# Patient Record
Sex: FEMALE | Race: WHITE | NOT HISPANIC OR LATINO | Employment: UNEMPLOYED | URBAN - METROPOLITAN AREA
[De-identification: names, ages, dates, MRNs, and addresses within clinical notes are randomized per-mention and may not be internally consistent; named-entity substitution may affect disease eponyms.]

---

## 2017-01-14 ENCOUNTER — COMMUNICATION - HEALTHEAST (OUTPATIENT)
Dept: FAMILY MEDICINE | Facility: CLINIC | Age: 32
End: 2017-01-14

## 2017-01-14 DIAGNOSIS — F33.9 MAJOR DEPRESSIVE DISORDER, RECURRENT EPISODE (H): ICD-10-CM

## 2017-01-14 DIAGNOSIS — F41.1 GENERALIZED ANXIETY DISORDER: ICD-10-CM

## 2017-02-09 ENCOUNTER — COMMUNICATION - HEALTHEAST (OUTPATIENT)
Dept: FAMILY MEDICINE | Facility: CLINIC | Age: 32
End: 2017-02-09

## 2017-02-13 ENCOUNTER — COMMUNICATION - HEALTHEAST (OUTPATIENT)
Dept: FAMILY MEDICINE | Facility: CLINIC | Age: 32
End: 2017-02-13

## 2017-02-13 DIAGNOSIS — F33.9 MAJOR DEPRESSIVE DISORDER, RECURRENT EPISODE (H): ICD-10-CM

## 2017-02-13 DIAGNOSIS — F41.1 GENERALIZED ANXIETY DISORDER: ICD-10-CM

## 2017-03-03 ENCOUNTER — OFFICE VISIT - HEALTHEAST (OUTPATIENT)
Dept: SURGERY | Facility: CLINIC | Age: 32
End: 2017-03-03

## 2017-03-03 DIAGNOSIS — Z71.3 DIETARY COUNSELING: ICD-10-CM

## 2017-03-03 DIAGNOSIS — Z98.84 BARIATRIC SURGERY STATUS: ICD-10-CM

## 2017-03-03 ASSESSMENT — MIFFLIN-ST. JEOR: SCORE: 1318.54

## 2017-03-24 ENCOUNTER — OFFICE VISIT - HEALTHEAST (OUTPATIENT)
Dept: FAMILY MEDICINE | Facility: CLINIC | Age: 32
End: 2017-03-24

## 2017-03-24 DIAGNOSIS — F33.9 MAJOR DEPRESSIVE DISORDER, RECURRENT EPISODE (H): ICD-10-CM

## 2017-03-24 DIAGNOSIS — Z15.89 MTHFR MUTATION: ICD-10-CM

## 2017-03-24 DIAGNOSIS — R19.04 LEFT LOWER QUADRANT ABDOMINAL MASS: ICD-10-CM

## 2017-03-24 DIAGNOSIS — Z00.00 ROUTINE GENERAL MEDICAL EXAMINATION AT A HEALTH CARE FACILITY: ICD-10-CM

## 2017-03-24 DIAGNOSIS — F41.1 GENERALIZED ANXIETY DISORDER: ICD-10-CM

## 2017-03-24 ASSESSMENT — MIFFLIN-ST. JEOR: SCORE: 1297.68

## 2017-04-04 ENCOUNTER — OFFICE VISIT - HEALTHEAST (OUTPATIENT)
Dept: ONCOLOGY | Facility: HOSPITAL | Age: 32
End: 2017-04-04

## 2017-04-04 DIAGNOSIS — Z15.89 MTHFR MUTATION: ICD-10-CM

## 2017-04-04 ASSESSMENT — MIFFLIN-ST. JEOR: SCORE: 1312.65

## 2017-05-30 ENCOUNTER — COMMUNICATION - HEALTHEAST (OUTPATIENT)
Dept: SURGERY | Facility: CLINIC | Age: 32
End: 2017-05-30

## 2017-07-10 ENCOUNTER — AMBULATORY - HEALTHEAST (OUTPATIENT)
Dept: SURGERY | Facility: CLINIC | Age: 32
End: 2017-07-10

## 2017-07-10 DIAGNOSIS — K90.9 UNSPECIFIED INTESTINAL MALABSORPTION: ICD-10-CM

## 2017-07-10 DIAGNOSIS — Z98.84 S/P BARIATRIC SURGERY: ICD-10-CM

## 2017-07-10 DIAGNOSIS — K91.2 OTHER AND UNSPECIFIED POSTSURGICAL NONABSORPTION: ICD-10-CM

## 2017-07-11 ENCOUNTER — AMBULATORY - HEALTHEAST (OUTPATIENT)
Dept: LAB | Facility: CLINIC | Age: 32
End: 2017-07-11

## 2017-07-11 DIAGNOSIS — K91.2 OTHER AND UNSPECIFIED POSTSURGICAL NONABSORPTION: ICD-10-CM

## 2017-07-11 DIAGNOSIS — Z98.84 S/P BARIATRIC SURGERY: ICD-10-CM

## 2017-07-11 DIAGNOSIS — K90.9 UNSPECIFIED INTESTINAL MALABSORPTION: ICD-10-CM

## 2017-07-11 LAB
CHOLEST SERPL-MCNC: 152 MG/DL
FASTING STATUS PATIENT QL REPORTED: NORMAL
HDLC SERPL-MCNC: 54 MG/DL
LDLC SERPL CALC-MCNC: 76 MG/DL
TRIGL SERPL-MCNC: 112 MG/DL

## 2017-07-14 ENCOUNTER — OFFICE VISIT - HEALTHEAST (OUTPATIENT)
Dept: SURGERY | Facility: CLINIC | Age: 32
End: 2017-07-14

## 2017-07-14 DIAGNOSIS — K91.2 POSTOPERATIVE MALABSORPTION: ICD-10-CM

## 2017-07-14 DIAGNOSIS — E67.0 HYPERVITAMINOSIS A: ICD-10-CM

## 2017-07-14 DIAGNOSIS — R79.89 ELEVATED VITAMIN B12 LEVEL: ICD-10-CM

## 2017-07-14 DIAGNOSIS — Z98.84 S/P GASTRIC BYPASS: ICD-10-CM

## 2017-07-14 ASSESSMENT — MIFFLIN-ST. JEOR: SCORE: 1338.95

## 2017-09-03 ENCOUNTER — HEALTH MAINTENANCE LETTER (OUTPATIENT)
Age: 32
End: 2017-09-03

## 2018-03-06 ENCOUNTER — AMBULATORY - HEALTHEAST (OUTPATIENT)
Dept: SURGERY | Facility: CLINIC | Age: 33
End: 2018-03-06

## 2018-03-06 DIAGNOSIS — E67.0 HIGH VITAMIN A LEVEL: ICD-10-CM

## 2018-03-06 DIAGNOSIS — K90.9 INTESTINAL MALABSORPTION, UNSPECIFIED TYPE: ICD-10-CM

## 2018-03-13 ENCOUNTER — AMBULATORY - HEALTHEAST (OUTPATIENT)
Dept: LAB | Facility: CLINIC | Age: 33
End: 2018-03-13

## 2018-03-13 DIAGNOSIS — K90.9 INTESTINAL MALABSORPTION, UNSPECIFIED TYPE: ICD-10-CM

## 2018-03-13 DIAGNOSIS — E67.0 HIGH VITAMIN A LEVEL: ICD-10-CM

## 2018-03-13 LAB — VIT B12 SERPL-MCNC: 668 PG/ML (ref 213–816)

## 2018-03-14 ENCOUNTER — OFFICE VISIT - HEALTHEAST (OUTPATIENT)
Dept: SURGERY | Facility: CLINIC | Age: 33
End: 2018-03-14

## 2018-03-14 DIAGNOSIS — Z98.84 HX OF GASTRIC BYPASS: ICD-10-CM

## 2018-03-14 DIAGNOSIS — K91.2 POSTOPERATIVE MALABSORPTION: ICD-10-CM

## 2018-03-14 DIAGNOSIS — K08.89 PAIN, DENTAL: ICD-10-CM

## 2018-03-14 ASSESSMENT — MIFFLIN-ST. JEOR: SCORE: 1380.23

## 2018-03-16 LAB
ANNOTATION COMMENT IMP: NORMAL
VIT A SERPL-MCNC: 0.94 MG/L (ref 0.3–1.2)
VITAMIN A (RETINYL PALMITATE): 0.02 MG/L (ref 0–0.1)

## 2018-03-17 ENCOUNTER — COMMUNICATION - HEALTHEAST (OUTPATIENT)
Dept: FAMILY MEDICINE | Facility: CLINIC | Age: 33
End: 2018-03-17

## 2018-03-17 DIAGNOSIS — F41.1 GENERALIZED ANXIETY DISORDER: ICD-10-CM

## 2018-03-17 DIAGNOSIS — F33.9 MAJOR DEPRESSIVE DISORDER, RECURRENT EPISODE (H): ICD-10-CM

## 2018-03-17 LAB — VIT B1 PYROPHOSHATE BLD-SCNC: 140 NMOL/L (ref 70–180)

## 2018-05-08 ENCOUNTER — RECORDS - HEALTHEAST (OUTPATIENT)
Dept: ADMINISTRATIVE | Facility: OTHER | Age: 33
End: 2018-05-08

## 2018-05-09 ENCOUNTER — RECORDS - HEALTHEAST (OUTPATIENT)
Dept: ADMINISTRATIVE | Facility: OTHER | Age: 33
End: 2018-05-09

## 2018-05-09 ENCOUNTER — AMBULATORY - HEALTHEAST (OUTPATIENT)
Dept: UROLOGY | Facility: CLINIC | Age: 33
End: 2018-05-09

## 2018-05-09 ENCOUNTER — OFFICE VISIT - HEALTHEAST (OUTPATIENT)
Dept: UROLOGY | Facility: CLINIC | Age: 33
End: 2018-05-09

## 2018-05-09 DIAGNOSIS — N20.1 CALCULUS OF URETER: ICD-10-CM

## 2018-05-09 DIAGNOSIS — N13.2 HYDRONEPHROSIS WITH URINARY OBSTRUCTION DUE TO URETERAL CALCULUS: ICD-10-CM

## 2018-05-09 DIAGNOSIS — E27.8 ADRENAL MASS (H): ICD-10-CM

## 2018-05-09 DIAGNOSIS — N20.0 CALCULUS OF KIDNEY: ICD-10-CM

## 2018-05-09 DIAGNOSIS — N20.9 URINARY TRACT STONES: ICD-10-CM

## 2018-05-09 LAB
ALBUMIN UR-MCNC: ABNORMAL MG/DL
APPEARANCE UR: CLEAR
BILIRUB UR QL STRIP: NEGATIVE
COLOR UR AUTO: YELLOW
GLUCOSE UR STRIP-MCNC: NEGATIVE MG/DL
HGB UR QL STRIP: ABNORMAL
KETONES UR STRIP-MCNC: NEGATIVE MG/DL
LEUKOCYTE ESTERASE UR QL STRIP: ABNORMAL
NITRATE UR QL: NEGATIVE
PH UR STRIP: 6.5 [PH] (ref 5–8)
SP GR UR STRIP: 1.02 (ref 1–1.03)
UROBILINOGEN UR STRIP-ACNC: ABNORMAL

## 2018-05-11 ENCOUNTER — ANESTHESIA - HEALTHEAST (OUTPATIENT)
Dept: SURGERY | Facility: CLINIC | Age: 33
End: 2018-05-11

## 2018-05-11 ENCOUNTER — SURGERY - HEALTHEAST (OUTPATIENT)
Dept: SURGERY | Facility: CLINIC | Age: 33
End: 2018-05-11

## 2018-05-11 ASSESSMENT — MIFFLIN-ST. JEOR: SCORE: 1387.6

## 2018-05-14 ENCOUNTER — COMMUNICATION - HEALTHEAST (OUTPATIENT)
Dept: UROLOGY | Facility: CLINIC | Age: 33
End: 2018-05-14

## 2018-05-14 DIAGNOSIS — N20.1 CALCULUS OF URETER: ICD-10-CM

## 2018-05-20 ASSESSMENT — ENCOUNTER SYMPTOMS
POOR WOUND HEALING: 0
DIFFICULTY URINATING: 0
HEMATURIA: 0
SKIN CHANGES: 0
FLANK PAIN: 1
BRUISES/BLEEDS EASILY: 1
PANIC: 0
DYSURIA: 0
INSOMNIA: 1
DECREASED LIBIDO: 0
DEPRESSION: 1
NERVOUS/ANXIOUS: 1
DECREASED CONCENTRATION: 1
NAIL CHANGES: 0
HOT FLASHES: 0
SWOLLEN GLANDS: 0

## 2018-05-21 ENCOUNTER — PRE VISIT (OUTPATIENT)
Dept: UROLOGY | Facility: CLINIC | Age: 33
End: 2018-05-21

## 2018-05-21 ENCOUNTER — AMBULATORY - HEALTHEAST (OUTPATIENT)
Dept: UROLOGY | Facility: CLINIC | Age: 33
End: 2018-05-21

## 2018-05-21 DIAGNOSIS — N20.9 URINARY CALCULUS: ICD-10-CM

## 2018-05-21 DIAGNOSIS — N20.1 CALCULUS OF URETER: ICD-10-CM

## 2018-05-21 DIAGNOSIS — E27.8 ADRENAL MASS (H): ICD-10-CM

## 2018-05-21 DIAGNOSIS — N20.0 CALCULUS OF KIDNEY: ICD-10-CM

## 2018-05-21 LAB
ALBUMIN UR-MCNC: ABNORMAL MG/DL
APPEARANCE UR: ABNORMAL
BILIRUB UR QL STRIP: NEGATIVE
COLOR UR AUTO: ABNORMAL
GLUCOSE UR STRIP-MCNC: ABNORMAL MG/DL
HGB UR QL STRIP: ABNORMAL
KETONES UR STRIP-MCNC: ABNORMAL MG/DL
LEUKOCYTE ESTERASE UR QL STRIP: ABNORMAL
NITRATE UR QL: NEGATIVE
PH UR STRIP: 6.5 [PH] (ref 5–8)
SP GR UR STRIP: 1.02 (ref 1–1.03)
UROBILINOGEN UR STRIP-ACNC: ABNORMAL

## 2018-05-21 NOTE — TELEPHONE ENCOUNTER
Reason for visit: Renal mass consult     Relevant information: pt saw Dr. Yoder at [facility]    Records[facility]labs: [facility]    Pt called: no need for a call    Rooming: collect a urine

## 2018-05-22 LAB — BACTERIA SPEC CULT: NO GROWTH

## 2018-05-23 ENCOUNTER — OFFICE VISIT (OUTPATIENT)
Dept: UROLOGY | Facility: CLINIC | Age: 33
End: 2018-05-23
Payer: COMMERCIAL

## 2018-05-23 ENCOUNTER — TELEPHONE (OUTPATIENT)
Dept: ENDOCRINOLOGY | Facility: CLINIC | Age: 33
End: 2018-05-23

## 2018-05-23 VITALS
SYSTOLIC BLOOD PRESSURE: 138 MMHG | DIASTOLIC BLOOD PRESSURE: 101 MMHG | BODY MASS INDEX: 25.99 KG/M2 | HEIGHT: 65 IN | OXYGEN SATURATION: 96 % | HEART RATE: 101 BPM | WEIGHT: 156 LBS

## 2018-05-23 DIAGNOSIS — E27.8 ADRENAL MASS, RIGHT (H): ICD-10-CM

## 2018-05-23 DIAGNOSIS — C64.2 MALIGNANT NEOPLASM OF KIDNEY EXCLUDING RENAL PELVIS, LEFT (H): Primary | ICD-10-CM

## 2018-05-23 RX ORDER — BUPROPION HYDROCHLORIDE 100 MG/1
TABLET ORAL
Refills: 3 | COMMUNITY
Start: 2017-12-18 | End: 2021-06-01

## 2018-05-23 RX ORDER — OXYCODONE HYDROCHLORIDE 5 MG/1
5-10 TABLET ORAL
COMMUNITY
Start: 2018-05-14 | End: 2018-06-14

## 2018-05-23 RX ORDER — BUPROPION HYDROCHLORIDE 100 MG/1
100 TABLET ORAL
COMMUNITY
Start: 2018-03-18 | End: 2018-06-14

## 2018-05-23 RX ORDER — EPINEPHRINE 0.3 MG/.3ML
INJECTION SUBCUTANEOUS
COMMUNITY
End: 2021-06-01

## 2018-05-23 RX ORDER — TOLTERODINE TARTRATE 2 MG/1
2 TABLET, EXTENDED RELEASE ORAL
COMMUNITY
Start: 2018-05-09 | End: 2018-06-14

## 2018-05-23 RX ORDER — ONDANSETRON 4 MG/1
4 TABLET, ORALLY DISINTEGRATING ORAL
COMMUNITY
Start: 2018-05-08 | End: 2018-06-14

## 2018-05-23 ASSESSMENT — PAIN SCALES - GENERAL: PAINLEVEL: NO PAIN (0)

## 2018-05-23 NOTE — TELEPHONE ENCOUNTER
To schedulers : please schedule with consult service (or open TYRONE) within the week.    Dorene Wright MD  Endocrine triage    [facility] Call Center    Phone Message    May a detailed message be left on voicemail: no    Reason for Call: Other: Carolyn from Urology called to schedule pt in Endo. Pt requesting call back to schedule a new pt appt. Referral is in Epic. Carolyn stated that they would like the pt seen within one month prior to her surgery at the end of June     Action Taken: Message routed to:  [facility] ([facility]): endo clinic coordinators

## 2018-05-23 NOTE — PROGRESS NOTES
We are pleased to see Mr. Suzy Rodríguez in consultation at the request of Dr. Yoder for the evaluation of chief complaint listed below    Chief Complaint:    Adrenal mass         History of Present Illness:     Suzy Rodríguez is a(n) 32 year old female w/ PMH of PCOS and MTHFR clotting disorder with pregnancy and urologic history of left nephrolithiasis s/p ureteroscopy with Dr. Delvalle at [facility] whom were are seeing for incidental finding of Right adrenal mass.  She is otherwise asymptomatic, however, notes that over the past 8-9, has noticed large amount of facial hair growth and irregular periods.  Some increase in depressive mood.  She has noticed some facial swelling as well.  Over the past year has noticed a change in her blood pressure, which is mildly elevated.  She denies any palpitations, headaches, or sweats.  She denies any diarrhea/consipation or changes in appetite.     CT a/p:  ABDOMEN: 9.2 x 8 cm heterogeneous right upper quadrant mass with small foci of  calcification within it which is likely arising from the adrenal gland though  inseparable from liver and upper pole of right kidney. It is incompletely  evaluated on this noncontrast study. 3 x 2.6 cm mass right lobe of liver on  image #85 also incompletely evaluated. 6 x 4 x 4 mm upper third left ureteral  obstructing stone with mild to moderate secondary hydronephrosis. Collection of  stones at lower pole left kidney extending over an area of approximately 6 x 7 x  4 mm. Additional nonobstructing 1 mm stone upper pole left kidney. 2 mm  nonobstructing stone noted upper pole right kidney with no hydronephrosis on the  right. Postop change consistent with gastric bypass. Noncontrast images of  spleen, left adrenal gland, gallbladder, and pancreas unremarkable. No aneurysm.  No adenopathy.           Past Medical History:   No past medical history on file.     Hx of MTHFR mutation, clotting disorder with pregnancy  Possible hx of  "PCOS  Possible hx of PE in 2009           Past Surgical History:   No past surgical history on file.  Hx RNY gastric bypass in 2016           Social History:   Works as a manager/social work.   with 2 children  Smoking: never smoker  Alcohol: occasional  IV Drug Use: None         Family History:   No family history on file.  Maternal grandmother had stomach cancer  Maternal grandfather had lung cancer  Cousin had NHL  Cousin with melanoma          Allergies:     Allergies   Allergen Reactions     Bee Venom Swelling     Ibuprofen Other (See Comments) and Hives            Medications:     Current Outpatient Prescriptions   Medication Sig     buPROPion (WELLBUTRIN) 100 MG tablet Take 100 mg by mouth     buPROPion (WELLBUTRIN) 100 MG tablet TAKE 1 TABLET (100 MG TOTAL) BY MOUTH 2 (TWO) TIMES A DAY.     calcium carbonate antacid 1000 MG CHEW      cholecalciferol (VITAMIN D-1000 MAX ST) 1000 units TABS Take 2,000 Units by mouth     EPINEPHrine (EPIPEN/ADRENACLICK/OR ANY BX GENERIC EQUIV) 0.3 MG/0.3ML injection 2-pack As directed     FLUoxetine (PROZAC) 20 MG capsule Take 20 mg by mouth     Multiple Vitamins-Calcium (ONE-A-DAY WOMENS PO)      ondansetron (ZOFRAN-ODT) 4 MG ODT tab Place 4 mg under the tongue     ORTHO TRI-CYCLEN (28) OR 1 TABLET DAILY     oxyCODONE IR (ROXICODONE) 5 MG tablet Take 5-10 mg by mouth     tolterodine (DETROL) 2 MG tablet Take 2 mg by mouth     No current facility-administered medications for this visit.             REVIEW OF SYSTEMS:    See HPI for pertinent details.  Remainder of 10-point ROS negative.         PHYSICAL EXAM   BP (!) 138/101 (BP Location: Right arm, Patient Position: Sitting, Cuff Size: Adult Regular)  Pulse 101  Ht 1.638 m (5' 4.5\")  Wt 70.8 kg (156 lb)  LMP 05/04/2018  SpO2 96%  BMI 26.36 kg/m2  GENERAL: No acute distress. Well nourished.   HEENT:  Sclerae anicteric.  Conjunctivae pink.  Moist mucous membranes.  NECK:  Supple.  No lymphadenopathy.  CARDIAC:  " Regular rate and rhythm.  LUNGS:  Non-labored breathing  BACK:  No costovertebral tenderness.  ABDOMEN: Soft, non-tender, no surgical scars, no organomegaly, non-tender.  SKIN: No rashes.  Dry.     EXTREMITIES:  Warm, well perfused.  No lower extremity edema bilaterally.  NEURO: normal gait, no focal deficits.           LABS AND IMAGING:     OSH plasma metanephrines within normal limits          ASSESSMENT:   Suzy Pandya is a 32 year old female who presents for incidental finding right sided adrenal mass.  Labs unremarkable with less concern for pheochromocytoma.            PLAN:   Will obtain endocrinology referral  Upload outside images and MRI for viewing  Refer to Dr. Mansfield for possible resection of mass      Chad Jimenez  Urology Resident, G2    Patient was seen and examined with Dr. Patel    Patient seen and examined with the resident.  Visit time 30 minutes and >50% spent in counseling.  I agree with the resident's note and plan of care.       Alvino Patel MD  Urology Staff    CC  Patient Care Team:  Jorge Hager MD as PCP - General  RAHEEL FRIEDMAN    Copy to patient  SUZY PANDYA  [address] [facility] [address]  Answers for HPI/ROS submitted by the patient on 5/20/2018   General Symptoms: No  Skin Symptoms: Yes  HENT Symptoms: No  EYE SYMPTOMS: No  HEART SYMPTOMS: No  LUNG SYMPTOMS: No  INTESTINAL SYMPTOMS: No  URINARY SYMPTOMS: Yes  GYNECOLOGIC SYMPTOMS: Yes  BREAST SYMPTOMS: No  SKELETAL SYMPTOMS: No  BLOOD SYMPTOMS: Yes  NERVOUS SYSTEM SYMPTOMS: No  MENTAL HEALTH SYMPTOMS: Yes  Changes in hair: Yes  Changes in moles/birth marks: No  Itching: No  Rashes: No  Changes in nails: No  Hair in places you don't want it: Yes  Change in facial hair: Yes  Warts: Yes  Non-healing sores: No  Scarring: No  Flaking of skin: No  Color changes of hands/feet in cold : No  Sun sensitivity: No  Skin thickening: No  Trouble holding urine or incontinence: No  Pain or burning: No  Trouble starting or  stopping: No  Increased frequency of urination: No  Blood in urine: No  Decreased frequency of urination: No  Frequent nighttime urination: No  Flank pain: Yes  Difficulty emptying bladder: No  Anemia: No  Swollen glands: No  Easy bleeding or bruising: Yes  Edema or swelling: No  Bleeding or spotting between periods: Yes  Heavy or painful periods: Yes  Irregular periods: Yes  Vaginal discharge: No  Hot flashes: No  Vaginal dryness: No  Genital ulcers: No  Reduced libido: No  Painful intercourse: No  Difficulty with sexual arousal: No  Post-menopausal bleeding: No  Nervous or Anxious: Yes  Depression: Yes  Trouble sleeping: Yes  Trouble thinking or concentrating: Yes  Mood changes: No  Panic attacks: No

## 2018-05-23 NOTE — MR AVS SNAPSHOT
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 Mansfield.    It was a pleasure meeting with you today.  Thank you for allowing me and my team the privilege of caring for you today.  YOU are the reason we are here, and I truly hope we provided you with the excellent service you deserve.  Please let us know if there is anything else we can do for you so that we can be sure you are leaving completely satisfied with your care experience.                  Follow-ups after your visit        Additional Services     ENDOCRINOLOGY ADULT REFERRAL       Your provider has referred you to: [facility]: Endocrinology and Diabetes Clinic [facility] [phone]   [email or web address]    R/O hormonal producing renal tumor    Please be aware that coverage of these services is subject to the terms and limitations of your health insurance plan.  Call member services at your health plan with any benefit or coverage questions.      Please bring the following to your appointment:    >>   Any x-rays, CTs or MRIs which have been performed.  Contact the facility where they were done to arrange for  prior to your scheduled appointment.    >>   List of current medications   >>   This referral request   >>   Any documents/labs given to you for this referral                  Your next 10 appointments already scheduled     Jun 07, 2018  9:20 AM CDT   (Arrive by 9:05 AM)   Return Visit with Warren Mansfield MD   [facility]  "Urology and Inst for Prostate and Urologic Cancers ([facility])    [address]  [address] [facility] [address] [phone]              Who to contact     Please call your clinic at [phone] to:    Ask questions about your health    Make or cancel appointments    Discuss your medicines    Learn about your test results    Speak to your doctor            Additional Information About Your Visit        Group [employer] Information     Guest of a Guest gives you secure access to your electronic health record. If you see a primary care provider, you can also send messages to your care team and make appointments. If you have questions, please call your primary care clinic.  If you do not have a primary care provider, please call [phone] and they will assist you.      Guest of a Guest is an electronic gateway that provides easy, online access to your medical records. With Guest of a Guest, you can request a clinic appointment, read your test results, renew a prescription or communicate with your care team.     To access your existing account, please contact your [facility] or call [phone] for assistance.        Care EveryWhere ID     This is your Care EveryWhere ID. This could be used by other organizations to access your [facility] medical records  [id number]        Your Vitals Were     Pulse Height Last Period Pulse Oximetry BMI (Body Mass Index)       101 1.638 m (5' 4.5\") 05/04/2018 96% 26.36 kg/m2        Blood Pressure from Last 3 Encounters:   05/23/18 (!) 138/101   08/09/06 108/60    Weight from Last 3 Encounters:   05/23/18 70.8 kg (156 lb)   08/09/06 66.2 kg (146 lb)              We Performed the Following     ENDOCRINOLOGY ADULT REFERRAL       Information about OPIOIDS     PRESCRIPTION OPIOIDS: WHAT YOU NEED TO KNOW   You have a prescription for an opioid (narcotic) pain medicine. Opioids can cause addiction. If you have a history of chemical dependency of any " type, you are at a higher risk of becoming addicted to opioids. Only take this medicine after all other options have been tried. Take it for as short a time and as few doses as possible.     Do not:    Drive. If you drive while taking these medicines, you could be arrested for driving under the influence (DUI).    Operate heavy machinery    Do any other dangerous activities while taking these medicines.     Drink any alcohol while taking these medicines.      Take with any other medicines that contain acetaminophen. Read all labels carefully. Look for the word  acetaminophen  or  Tylenol.  Ask your pharmacist if you have questions or are unsure.    Store your pills in a secure place, locked if possible. We will not replace any lost or stolen medicine. If you don t finish your medicine, please throw away (dispose) as directed by your pharmacist. The [place] Pollution Control Agency has more information about safe disposal: https://www.pca.[facility].[place].us/living-green/managing-unwanted-medications    All opioids tend to cause constipation. Drink plenty of water and eat foods that have a lot of fiber, such as fruits, vegetables, prune juice, apple juice and high-fiber cereal. Take a laxative (Miralax, milk of magnesia, Colace, Senna) if you don t move your bowels at least every other day.          Primary Care Provider Office Phone # Fax #    [facility] [phone]       [address] [place] [address]        Equal Access to Services     MARQUITA VALADEZ AH: Maury Redding, waaxda luqjason, qaybta kaalcoleman segura. So [facility] [phone].    ATENCIÓN: Si habla español, tiene a lyman disposición servicios gratuitos de asistencia lingüística. Rena al [phone].    We comply with applicable federal civil rights laws and [place] laws. We do not discriminate on the basis of race, color, national origin, age, disability, sex,  sexual orientation, or gender identity.            Thank you!     Thank you for choosing [facility] UROLOGY AND [facility] FOR PROSTATE AND UROLOGIC CANCERS  for your care. Our goal is always to provide you with excellent care. Hearing back from our patients is one way we can continue to improve our services. Please take a few minutes to complete the written survey that you may receive in the mail after your visit with us. Thank you!             Your Updated Medication List - Protect others around you: Learn how to safely use, store and throw away your medicines at www.disposemymeds.org.          This list is accurate as of 5/23/18 12:24 PM.  Always use your most recent med list.                   Brand Name Dispense Instructions for use Diagnosis    * buPROPion 100 MG tablet    WELLBUTRIN     TAKE 1 TABLET (100 MG TOTAL) BY MOUTH 2 (TWO) TIMES A DAY.        * buPROPion 100 MG tablet    WELLBUTRIN     Take 100 mg by mouth        calcium carbonate antacid 1000 MG Chew           EPINEPHrine 0.3 MG/0.3ML injection 2-pack    EPIPEN/ADRENACLICK/or ANY BX GENERIC EQUIV     As directed        FLUoxetine 20 MG capsule    PROzac     Take 20 mg by mouth        ondansetron 4 MG ODT tab    ZOFRAN-ODT     Place 4 mg under the tongue        ONE-A-DAY WOMENS PO           ORTHO TRI-CYCLEN (28) PO      1 TABLET DAILY        oxyCODONE IR 5 MG tablet    ROXICODONE     Take 5-10 mg by mouth        tolterodine 2 MG tablet    DETROL     Take 2 mg by mouth        VITAMIN D-1000 MAX ST 1000 units Tabs   Generic drug:  cholecalciferol      Take 2,000 Units by mouth        * Notice:  This list has 2 medication(s) that are the same as other medications prescribed for you. Read the directions carefully, and ask your doctor or other care provider to review them with you.

## 2018-05-23 NOTE — NURSING NOTE
Chief Complaint   Patient presents with     Consult For     right adrenal gland       Devon Corrales MA

## 2018-05-23 NOTE — PATIENT INSTRUCTIONS
Please make a appointment with Endocrinoloy  Please make a appointment with Dr. Mansfield.    It was a pleasure meeting with you today.  Thank you for allowing me and my team the privilege of caring for you today.  YOU are the reason we are here, and I truly hope we provided you with the excellent service you deserve.  Please let us know if there is anything else we can do for you so that we can be sure you are leaving completely satisfied with your care experience.

## 2018-05-25 ENCOUNTER — RECORDS - HEALTHEAST (OUTPATIENT)
Dept: ADMINISTRATIVE | Facility: OTHER | Age: 33
End: 2018-05-25

## 2018-05-25 ENCOUNTER — OFFICE VISIT (OUTPATIENT)
Dept: ENDOCRINOLOGY | Facility: CLINIC | Age: 33
End: 2018-05-25
Payer: COMMERCIAL

## 2018-05-25 VITALS
WEIGHT: 157.8 LBS | SYSTOLIC BLOOD PRESSURE: 138 MMHG | BODY MASS INDEX: 26.29 KG/M2 | HEIGHT: 65 IN | DIASTOLIC BLOOD PRESSURE: 82 MMHG | HEART RATE: 76 BPM

## 2018-05-25 DIAGNOSIS — E27.8 RIGHT ADRENAL MASS (H): Primary | ICD-10-CM

## 2018-05-25 DIAGNOSIS — R03.0 ELEVATED BLOOD PRESSURE READING WITHOUT DIAGNOSIS OF HYPERTENSION: ICD-10-CM

## 2018-05-25 RX ORDER — DEXAMETHASONE 1 MG
1 TABLET ORAL ONCE
Qty: 1 TABLET | Refills: 0 | Status: SHIPPED | OUTPATIENT
Start: 2018-05-25 | End: 2018-06-07

## 2018-05-25 ASSESSMENT — PAIN SCALES - GENERAL: PAINLEVEL: NO PAIN (0)

## 2018-05-25 NOTE — PROGRESS NOTES
Endocrinology Clinic Visit 5/25/2018  NAME: Suzy Rodríguez  PCP:  [facility]  MRN:  [id number]  Reason for Consult:  Adrenal Mass  Requesting Provider:  Alvino Patel    Chief Complaint     Chief Complaint   Patient presents with     Consult     kidney cx       History of Present Illness     Suzy Rodríguez is a 32 year old female with past medical history of PCOS and MTHFR clotting disorder who is seen in clinic for an adrenal nodule. This was discovered incidentally on CT scan of the abdomen and pelvis done on 5/8/2018 when she presented with flank pain due to nephrolithiasis.  CT showed a 9.2×8 cm heterogeneous right upper quadrant mass with small foci of calcification within it which is likely arising from the adrenal gland though inseparable from the liver and upper pole of right kidney.  There was also a 6×4×4 mm left upper third ureteral obstructing stone with mild to moderate secondary hydronephrosis.  Additional bilateral nonobstructing nephrolithiasis was seen.    [facility] where she had procedure and removal of the kidney stone.  That day she was told about the incidental adrenal finding, and MRI of the abdomen was ordered.    MRI May 15:  9.2x8.1x7.6 cm right indeterminate.     Most recent labs: BMP on 5/8/18: K normal at 3.7.     The patient saw urology on 5/23/2018 who referred her to endocrinology for endocrine workup of the mass.  She was also referred to Dr. Mansfield for surgical removal of the mass.  She is scheduled to see him on June 7.    As far as symptoms, she mostly reports feeling more depressed in the past few months.  Her blood pressure today is elevated and was also elevated on May 23.  She does not have a diagnosis of hypertension, and she does not monitor her blood pressure at home.  But she did note that at her annual PCP visits, BP recently risen over past few years.  It was 130s systolic in the last few  years.  No spells, no chest pain, no panic attacks.     The patient underwent gastric bypass 2 years ago.  Pre-surgery 235   Lowest 135 lb  Today 157  She has gained the last 20 pounds in the last 8 months or so due to a foot injury from stress fracture from running, and due to depression.    Also reports:  Some easy bruising.  Rounding of the face  Increased facial hair  Irregular menses. Missed one month.  No history of hypokalemia    Problem List     Patient Active Problem List   Diagnosis     Adrenal mass, right (H)        Medications     Current Outpatient Prescriptions   Medication     buPROPion (WELLBUTRIN) 100 MG tablet     buPROPion (WELLBUTRIN) 100 MG tablet     calcium carbonate antacid 1000 MG CHEW     cholecalciferol (VITAMIN D-1000 MAX ST) 1000 units TABS     dexamethasone (DECADRON) 1 MG tablet     EPINEPHrine (EPIPEN/ADRENACLICK/OR ANY BX GENERIC EQUIV) 0.3 MG/0.3ML injection 2-pack     FLUoxetine (PROZAC) 20 MG capsule     Multiple Vitamins-Calcium (ONE-A-DAY WOMENS PO)     oxyCODONE IR (ROXICODONE) 5 MG tablet     ondansetron (ZOFRAN-ODT) 4 MG ODT tab     ORTHO TRI-CYCLEN (28) OR     tolterodine (DETROL) 2 MG tablet     No current facility-administered medications for this visit.         Allergies     Allergies   Allergen Reactions     Bee Venom Swelling     Ibuprofen Other (See Comments) and Hives       Medical / Surgical History     No past medical history on file.  No past surgical history on file.    Social History     Social History     Social History     Marital status:      Spouse name: N/A     Number of children: N/A     Years of education: N/A     Occupational History     Not on file.     Social History Main Topics     Smoking status: Never Smoker     Smokeless tobacco: Never Used     Alcohol use Yes      Comment: occ.     Drug use: No     Sexual activity: No     Other Topics Concern     Not on file     Social History Narrative       Family History     Negative for adrenal  "issues    ROS     Constitutional: no fevers, chills, night sweats. + fatigue. Good appetite  Eyes: no vision changes, no eye redness, no diplopia  Ears, Nose, mouth, throat: no hearing changes, no tinnitus, no rhinorrhea, no nasal congestion  Cardiovascular: no chest pain, no orthopnea or PND, no edema, no palpitations  Respiratory: no dyspnea, no cough, no sputum, no wheezing  Gastrointestinal: no nausea, no vomiting, no abdominal pain, no diarrhea, no constipation  Genitourinary: no dysuria, no frequency, no urgency, no nocturia  Musculoskeletal: no joint pains, no back pain, no cramps, no fractures  Skin: no rash, no itching, no dryness, no ulcers, no hair loss, no nail changes  Neurological:no headaches, no weakness, no numbness, no tingling, no tremors  Psychiatric: + anxiety, + sadness  Hematologic/lymphatic: + easy bruising, no bleeding, no palor    Physical Exam   BP (!) 164/120  Pulse 76  Ht 1.638 m (5' 4.5\")  Wt 71.6 kg (157 lb 12.8 oz)  LMP 05/04/2018  BMI 26.67 kg/m2     General: Comfortable, no obvious distress, normal body habitus  Eyes: Sclera anicteric, moist conjunctiva  HENT: Atraumatic, oropharynx clear, moist mucous membranes with no mucosal ulcerations, normal enamel on teeth  Neck: Trachea midline, supple. Thyroid: Thyroid is normal in size and texture  Back: no supraclavicular fat pad  CV: Regular rhythm, normal rate. No murmurs auscultated  Resp: Clear to auscultation bilaterally, good effort  Abdomen:  Soft, non tender, non distended. Bowel sounds heard. No organomegaly. No striae  Skin: No rashes, lesions, or subcutaneous nodules.   Psych: Alert and oriented x 3. Appropriate affect, good insight  Extremities: No peripheral edema  Musculoskeletal: Appropriate muscle bulk and strength  Lymphatic: No cervical lymphadenopathy  Neuro: Moves all four extremities. No focal deficits on limited exam. No proximal muscle weakness. Gait normal.     Labs/Imaging     Pertinent Labs were reviewed and " updated in [facility].  Radiology Results were  reviewed and updated in EPIC.    I personally reviewed the patient's outside records from Care Everywhere. Summary of pertinent findings in HPI.      Impression / Plan     1. Adrenal Mass:    Incidental finding 9 cm right sided.     No previous CT to compare. Unclear how long it has been there.     The main purpose of evaluation is to determine if this nodule is malignant, or if it secretes hormones.     Based on the nodule's size and imaging characteristics, it can be considered to be at moderate risk of malignancy. At this point, surgery is needed.     While the vast majority of these tumors are benign and nonfunctioning, a fair amount secrete hormones. Clinically I have a very low suspicion that is is secreting any excess hormones. However, based on the AACE and Endocrine Society guidelines, it is recommended to screen all adrenal nodules for hormone excess, since they can be asymptomatic.   Thus we will obtain:   - 24 hour urine fractionated metanephrines and catecholamines to screen for pheochromocytoma (recommended for all adrenal nodules)  - 24 hr urine free cortisol  - 1 mg dexamethasone suppression test to screen for cortisol secretion (recommended for all adrenal nodules)  - Morning Plasma aldosterone and plasma renin activity. For screening for hyperaldosteronism, it is recommended for nodules where there is a history of HTN.    2, Elevated BP:  Workup as per above  Advised BP checks at home.   Urgent or ED care if persistent >180/100  Otherwise if >160/90, contact PCP     Follow up: RODRÍGUEZ Beverly MD  Endocrinology, Diabetes and Metabolism  [facility]

## 2018-05-25 NOTE — PATIENT INSTRUCTIONS
1- Collect 24 hour urine for cortisol and metanephrines.     2- on a separate day: take dexamethasone pill at midnight and come to the lab at 8 am for a blood draw. [facility] lab.       Roel Beverly MD  Endocrinology, Diabetes and Metabolism  [facility]

## 2018-05-25 NOTE — MR AVS SNAPSHOT
After Visit Summary   5/25/2018    Suzy Rodríguez    MRN: [id number]           Patient Information     Date Of Birth          1985        Visit Information        Provider Department      5/25/2018 10:00 AM Roel Beverly MD [facility] Endocrinology        Today's Diagnoses     Right adrenal mass (H)    -  1    Elevated blood pressure reading without diagnosis of hypertension          Care Instructions    1- Collect 24 hour urine for cortisol and metanephrines.     2- on a separate day: take dexamethasone pill at midnight and come to the lab at 8 am for a blood draw. Any [facility] lab.       Roel Beverly MD  Endocrinology, Diabetes and Metabolism  [facility]            Follow-ups after your visit        Your next 10 appointments already scheduled     Jun 07, 2018  9:20 AM CDT   (Arrive by 9:05 AM)   Return Visit with Warren Mansfield MD   [facility] Urology and [facility] for Prostate and Urologic Cancers ([facility])    [address] [place] [address] [phone]              Future tests that were ordered for you today     Open Future Orders        Priority Expected Expires Ordered    Metanephrine random or 24 hr urine Routine 5/26/2018 11/21/2018 5/25/2018    Catecholamines fractioned free urine Routine  5/25/2019 5/25/2018    Cortisol free urine Routine  5/26/2019 5/25/2018    Cortisol serum AM Routine  6/24/2018 5/25/2018    Aldosterone Routine  5/25/2019 5/25/2018    Renin activity Routine  5/25/2019 5/25/2018            Who to contact     Please call your clinic at [phone] to:    Ask questions about your health    Make or cancel appointments    Discuss your medicines    Learn about your test results    Speak to your doctor            Additional Information About Your Visit        MyChart Information     [employer] gives you secure access to your electronic health record. If you see a primary care provider, you can  "also send messages to your care team and make appointments. If you have questions, please call your primary care clinic.  If you do not have a primary care provider, please call [phone] and they will assist you.      [employer] is an electronic gateway that provides easy, online access to your medical records. With [employer], you can request a clinic appointment, read your test results, renew a prescription or communicate with your care team.     To access your existing account, please contact your [facility] or call [phone] for assistance.        Care EveryWhere ID     This is your Care EveryWhere ID. This could be used by other organizations to access your [facility] medical records  [id number]        Your Vitals Were     Pulse Height Last Period BMI (Body Mass Index)          76 1.638 m (5' 4.5\") 05/04/2018 26.67 kg/m2         Blood Pressure from Last 3 Encounters:   05/25/18 138/82   05/23/18 (!) 138/101   08/09/06 108/60    Weight from Last 3 Encounters:   05/25/18 71.6 kg (157 lb 12.8 oz)   05/23/18 70.8 kg (156 lb)   08/09/06 66.2 kg (146 lb)                 Today's Medication Changes          These changes are accurate as of 5/25/18 10:54 AM.  If you have any questions, ask your nurse or doctor.               Start taking these medicines.        Dose/Directions    dexamethasone 1 MG tablet   Commonly known as:  DECADRON   Used for:  Right adrenal mass (H)        Dose:  1 mg   Take 1 tablet (1 mg) by mouth once for 1 dose Take it at midnight and come to the lab the next day at 8 am.   Quantity:  1 tablet   Refills:  0            Where to get your medicines      These medications were sent to [facility] - [address]  [address] [address]     Phone:  [phone]     dexamethasone 1 MG tablet                Primary Care Provider Office Phone # Fax #    [facility] [phone] [address] [address]        Equal Access to Services     MARQUITA VALADEZ : Hadii aad ku hadjadynyoli Redding, wajavida devang, qaybta ruilarissajohnnie koehler, coleman reyesarleygigi young. So [facility] [phone].    ATENCIÓN: Si habla español, tiene a lyman disposición servicios gratuitos de asistencia lingüística. Llame al [phone].    We comply with applicable federal civil rights laws and [place] laws. We do not discriminate on the basis of race, color, national origin, age, disability, sex, sexual orientation, or gender identity.            Thank you!     Thank you for choosing [facility] ENDOCRINOLOGY  for your care. Our goal is always to provide you with excellent care. Hearing back from our patients is one way we can continue to improve our services. Please take a few minutes to complete the written survey that you may receive in the mail after your visit with us. Thank you!             Your Updated Medication List - Protect others around you: Learn how to safely use, store and throw away your medicines at www.disposemymeds.org.          This list is accurate as of 5/25/18 10:54 AM.  Always use your most recent med list.                   Brand Name Dispense Instructions for use Diagnosis    * buPROPion 100 MG tablet    WELLBUTRIN     TAKE 1 TABLET (100 MG TOTAL) BY MOUTH 2 (TWO) TIMES A DAY.        * buPROPion 100 MG tablet    WELLBUTRIN     Take 100 mg by mouth        calcium carbonate antacid 1000 MG Chew           dexamethasone 1 MG tablet    DECADRON    1 tablet    Take 1 tablet (1 mg) by mouth once for 1 dose Take it at midnight and come to the lab the next day at 8 am.    Right adrenal mass (H)       EPINEPHrine 0.3 MG/0.3ML injection 2-pack    EPIPEN/ADRENACLICK/or ANY BX GENERIC EQUIV     As directed        FLUoxetine 20 MG capsule    PROzac     Take 20 mg by mouth        ondansetron 4 MG ODT tab    ZOFRAN-ODT     Place 4 mg under the tongue        ONE-A-DAY WOMENS PO            ORTHO TRI-CYCLEN (28) PO      1 TABLET DAILY        oxyCODONE IR 5 MG tablet    ROXICODONE     Take 5-10 mg by mouth        tolterodine 2 MG tablet    DETROL     Take 2 mg by mouth        VITAMIN D-1000 MAX ST 1000 units Tabs   Generic drug:  cholecalciferol      Take 2,000 Units by mouth        * Notice:  This list has 2 medication(s) that are the same as other medications prescribed for you. Read the directions carefully, and ask your doctor or other care provider to review them with you.

## 2018-05-25 NOTE — LETTER
5/25/2018       RE: Suzy Rodríguez  [address]  [place] [address]     Dear Colleague,    Thank you for referring your patient, Suzy Rodríguez, to the [facility] ENDOCRINOLOGY at [facility]. Please see a copy of my visit note below.    Endocrinology Clinic Visit 5/25/2018  NAME: Suzy Rodríguez  PCP:  [facility]  MRN:  [id number]  Reason for Consult:  Adrenal Mass  Requesting Provider:  Alvino Patel    Chief Complaint     Chief Complaint   Patient presents with     Consult     kidney cx       History of Present Illness     Suzy Rodríguez is a 32 year old female with past medical history of PCOS and MTHFR clotting disorder who is seen in clinic for an adrenal nodule. This was discovered incidentally on CT scan of the abdomen and pelvis done on 5/8/2018 when she presented with flank pain due to nephrolithiasis.  CT showed a 9.2×8 cm heterogeneous right upper quadrant mass with small foci of calcification within it which is likely arising from the adrenal gland though inseparable from the liver and upper pole of right kidney.  There was also a 6×4×4 mm left upper third ureteral obstructing stone with mild to moderate secondary hydronephrosis.  Additional bilateral nonobstructing nephrolithiasis was seen.    The patient [facility] where she had procedure and removal of the kidney stone.  That day she was told about the incidental adrenal finding, and MRI of the abdomen was ordered.    MRI May 15:  9.2x8.1x7.6 cm right indeterminate.     Most recent labs: BMP on 5/8/18: K normal at 3.7.     The patient saw urology on 5/23/2018 who referred her to endocrinology for endocrine workup of the mass.  She was also referred to Dr. Mansfield for surgical removal of the mass.  She is scheduled to see him on June 7.    As far as symptoms, she mostly reports feeling more depressed in the past few months.  Her blood pressure today  is elevated and was also elevated on May 23.  She does not have a diagnosis of hypertension, and she does not monitor her blood pressure at home.  But she did note that at her annual PCP visits, BP recently risen over past few years.  It was 130s systolic in the last few years.  No spells, no chest pain, no panic attacks.     The patient underwent gastric bypass 2 years ago.  Pre-surgery 235   Lowest 135 lb  Today 157  She has gained the last 20 pounds in the last 8 months or so due to a foot injury from stress fracture from running, and due to depression.    Also reports:  Some easy bruising.  Rounding of the face  Increased facial hair  Irregular menses. Missed one month.  No history of hypokalemia    Problem List     Patient Active Problem List   Diagnosis     Adrenal mass, right (H)        Medications     Current Outpatient Prescriptions   Medication     buPROPion (WELLBUTRIN) 100 MG tablet     buPROPion (WELLBUTRIN) 100 MG tablet     calcium carbonate antacid 1000 MG CHEW     cholecalciferol (VITAMIN D-1000 MAX ST) 1000 units TABS     dexamethasone (DECADRON) 1 MG tablet     EPINEPHrine (EPIPEN/ADRENACLICK/OR ANY BX GENERIC EQUIV) 0.3 MG/0.3ML injection 2-pack     FLUoxetine (PROZAC) 20 MG capsule     Multiple Vitamins-Calcium (ONE-A-DAY WOMENS PO)     oxyCODONE IR (ROXICODONE) 5 MG tablet     ondansetron (ZOFRAN-ODT) 4 MG ODT tab     ORTHO TRI-CYCLEN (28) OR     tolterodine (DETROL) 2 MG tablet     No current facility-administered medications for this visit.         Allergies     Allergies   Allergen Reactions     Bee Venom Swelling     Ibuprofen Other (See Comments) and Hives       Medical / Surgical History     No past medical history on file.  No past surgical history on file.    Social History     Social History     Social History     Marital status:      Spouse name: N/A     Number of children: N/A     Years of education: N/A     Occupational History     Not on file.     Social History Main Topics  "    Smoking status: Never Smoker     Smokeless tobacco: Never Used     Alcohol use Yes      Comment: occ.     Drug use: No     Sexual activity: No     Other Topics Concern     Not on file     Social History Narrative       Family History     Negative for adrenal issues    ROS     Constitutional: no fevers, chills, night sweats. + fatigue. Good appetite  Eyes: no vision changes, no eye redness, no diplopia  Ears, Nose, mouth, throat: no hearing changes, no tinnitus, no rhinorrhea, no nasal congestion  Cardiovascular: no chest pain, no orthopnea or PND, no edema, no palpitations  Respiratory: no dyspnea, no cough, no sputum, no wheezing  Gastrointestinal: no nausea, no vomiting, no abdominal pain, no diarrhea, no constipation  Genitourinary: no dysuria, no frequency, no urgency, no nocturia  Musculoskeletal: no joint pains, no back pain, no cramps, no fractures  Skin: no rash, no itching, no dryness, no ulcers, no hair loss, no nail changes  Neurological:no headaches, no weakness, no numbness, no tingling, no tremors  Psychiatric: + anxiety, + sadness  Hematologic/lymphatic: + easy bruising, no bleeding, no palor    Physical Exam   BP (!) 164/120  Pulse 76  Ht 1.638 m (5' 4.5\")  Wt 71.6 kg (157 lb 12.8 oz)  LMP 05/04/2018  BMI 26.67 kg/m2     General: Comfortable, no obvious distress, normal body habitus  Eyes: Sclera anicteric, moist conjunctiva  HENT: Atraumatic, oropharynx clear, moist mucous membranes with no mucosal ulcerations, normal enamel on teeth  Neck: Trachea midline, supple. Thyroid: Thyroid is normal in size and texture  Back: no supraclavicular fat pad  CV: Regular rhythm, normal rate. No murmurs auscultated  Resp: Clear to auscultation bilaterally, good effort  Abdomen:  Soft, non tender, non distended. Bowel sounds heard. No organomegaly. No striae  Skin: No rashes, lesions, or subcutaneous nodules.   Psych: Alert and oriented x 3. Appropriate affect, good insight  Extremities: No peripheral " edema  Musculoskeletal: Appropriate muscle bulk and strength  Lymphatic: No cervical lymphadenopathy  Neuro: Moves all four extremities. No focal deficits on limited exam. No proximal muscle weakness. Gait normal.     Labs/Imaging     Pertinent Labs were reviewed and updated in [facility].  Radiology Results were  reviewed and updated in EPIC.    I personally reviewed the patient's outside records from Care Everywhere. Summary of pertinent findings in HPI.      Impression / Plan     1. Adrenal Mass:    Incidental finding 9 cm right sided.     No previous CT to compare. Unclear how long it has been there.     The main purpose of evaluation is to determine if this nodule is malignant, or if it secretes hormones.     Based on the nodule's size and imaging characteristics, it can be considered to be at moderate risk of malignancy. At this point, surgery is needed.     While the vast majority of these tumors are benign and nonfunctioning, a fair amount secrete hormones. Clinically I have a very low suspicion that is is secreting any excess hormones. However, based on the AACE and Endocrine Society guidelines, it is recommended to screen all adrenal nodules for hormone excess, since they can be asymptomatic.   Thus we will obtain:   - 24 hour urine fractionated metanephrines and catecholamines to screen for pheochromocytoma (recommended for all adrenal nodules)  - 24 hr urine free cortisol  - 1 mg dexamethasone suppression test to screen for cortisol secretion (recommended for all adrenal nodules)  - Morning Plasma aldosterone and plasma renin activity. For screening for hyperaldosteronism, it is recommended for nodules where there is a history of HTN.    2, Elevated BP:  Workup as per above  Advised BP checks at home.   Urgent or ED care if persistent >180/100  Otherwise if >160/90, contact PCP     Follow up: RODRÍGUEZ Beverly MD  Endocrinology, Diabetes and Metabolism  [facility]

## 2018-05-28 PROCEDURE — 82530 CORTISOL FREE: CPT | Mod: 90 | Performed by: INTERNAL MEDICINE

## 2018-05-28 PROCEDURE — 99000 SPECIMEN HANDLING OFFICE-LAB: CPT | Performed by: INTERNAL MEDICINE

## 2018-05-28 PROCEDURE — 83835 ASSAY OF METANEPHRINES: CPT | Mod: 90 | Performed by: INTERNAL MEDICINE

## 2018-05-30 ENCOUNTER — COMMUNICATION - HEALTHEAST (OUTPATIENT)
Dept: FAMILY MEDICINE | Facility: CLINIC | Age: 33
End: 2018-05-30

## 2018-05-30 DIAGNOSIS — R03.0 ELEVATED BLOOD PRESSURE READING WITHOUT DIAGNOSIS OF HYPERTENSION: ICD-10-CM

## 2018-05-30 DIAGNOSIS — E27.8 RIGHT ADRENAL MASS (H): ICD-10-CM

## 2018-05-31 DIAGNOSIS — E27.8 RIGHT ADRENAL MASS (H): ICD-10-CM

## 2018-05-31 DIAGNOSIS — R03.0 ELEVATED BLOOD PRESSURE READING WITHOUT DIAGNOSIS OF HYPERTENSION: ICD-10-CM

## 2018-05-31 LAB — CORTIS SERPL-MCNC: 23 UG/DL (ref 4–22)

## 2018-05-31 PROCEDURE — 82088 ASSAY OF ALDOSTERONE: CPT | Mod: 90 | Performed by: INTERNAL MEDICINE

## 2018-05-31 PROCEDURE — 84244 ASSAY OF RENIN: CPT | Mod: 90 | Performed by: INTERNAL MEDICINE

## 2018-05-31 PROCEDURE — 82533 TOTAL CORTISOL: CPT | Performed by: INTERNAL MEDICINE

## 2018-05-31 PROCEDURE — 99000 SPECIMEN HANDLING OFFICE-LAB: CPT | Performed by: INTERNAL MEDICINE

## 2018-05-31 PROCEDURE — 36415 COLL VENOUS BLD VENIPUNCTURE: CPT | Performed by: INTERNAL MEDICINE

## 2018-06-01 ENCOUNTER — OFFICE VISIT - HEALTHEAST (OUTPATIENT)
Dept: FAMILY MEDICINE | Facility: CLINIC | Age: 33
End: 2018-06-01

## 2018-06-01 DIAGNOSIS — I15.2 HYPERTENSION DUE TO ENDOCRINE DISORDER: ICD-10-CM

## 2018-06-01 DIAGNOSIS — R31.21 ASYMPTOMATIC MICROSCOPIC HEMATURIA: ICD-10-CM

## 2018-06-01 DIAGNOSIS — E27.8 ADRENAL MASS (H): ICD-10-CM

## 2018-06-01 LAB
ALBUMIN SERPL-MCNC: 4 G/DL (ref 3.5–5)
ALBUMIN UR-MCNC: NEGATIVE MG/DL
ALDOST SERPL-MCNC: 13.4 NG/DL
ALP SERPL-CCNC: 123 U/L (ref 45–120)
ALT SERPL W P-5'-P-CCNC: 29 U/L (ref 0–45)
ANION GAP SERPL CALCULATED.3IONS-SCNC: 12 MMOL/L (ref 5–18)
APPEARANCE UR: CLEAR
AST SERPL W P-5'-P-CCNC: 22 U/L (ref 0–40)
ATRIAL RATE - MUSE: 67 BPM
BASOPHILS # BLD AUTO: 0.1 THOU/UL (ref 0–0.2)
BASOPHILS NFR BLD AUTO: 1 % (ref 0–2)
BILIRUB SERPL-MCNC: 0.5 MG/DL (ref 0–1)
BILIRUB UR QL STRIP: NEGATIVE
BUN SERPL-MCNC: 14 MG/DL (ref 8–22)
CALCIUM SERPL-MCNC: 9.4 MG/DL (ref 8.5–10.5)
CHLORIDE BLD-SCNC: 104 MMOL/L (ref 98–107)
CO2 SERPL-SCNC: 26 MMOL/L (ref 22–31)
COLLECT DURATION TIME SPEC: 24 H
COLLECT DURATION TIME UR: 24 HR
COLOR UR AUTO: YELLOW
CORTIS F 24H UR HPLC-MCNC: 145 UG/L
CORTIS F 24H UR-MRATE: 351.6 UG/D
CORTIS F/CREAT 24H UR: 263.64 UG/G CRT
CREAT 24H UR-MCNC: 55 MG/DL
CREAT 24H UR-MRATE: 1334 MG/D (ref 700–1600)
CREAT 24H UR-MRATE: 1334 MG/D (ref 700–1600)
CREAT SERPL-MCNC: 0.68 MG/DL (ref 0.6–1.1)
CREAT SERPL-MCNC: 55 MG/DL
DIASTOLIC BLOOD PRESSURE - MUSE: NORMAL MMHG
EOSINOPHIL # BLD AUTO: 0.1 THOU/UL (ref 0–0.4)
EOSINOPHIL NFR BLD AUTO: 1 % (ref 0–6)
ERYTHROCYTE [DISTWIDTH] IN BLOOD BY AUTOMATED COUNT: 12.1 % (ref 11–14.5)
GFR SERPL CREATININE-BSD FRML MDRD: >60 ML/MIN/1.73M2
GLUCOSE BLD-MCNC: 70 MG/DL (ref 70–125)
GLUCOSE UR STRIP-MCNC: NEGATIVE MG/DL
HCT VFR BLD AUTO: 48.2 % (ref 35–47)
HGB BLD-MCNC: 16.2 G/DL (ref 12–16)
HGB UR QL STRIP: ABNORMAL
IMP & REVIEW OF LAB RESULTS: ABNORMAL
INTERPRETATION ECG - MUSE: NORMAL
KETONES UR STRIP-MCNC: NEGATIVE MG/DL
LEUKOCYTE ESTERASE UR QL STRIP: ABNORMAL
LYMPHOCYTES # BLD AUTO: 2.8 THOU/UL (ref 0.8–4.4)
LYMPHOCYTES NFR BLD AUTO: 21 % (ref 20–40)
MCH RBC QN AUTO: 30.9 PG (ref 27–34)
MCHC RBC AUTO-ENTMCNC: 33.7 G/DL (ref 32–36)
MCV RBC AUTO: 92 FL (ref 80–100)
METANEPH 24H UR-MCNC: 23 UG/L
METANEPH 24H UR-MRATE: 56 UG/D (ref 39–143)
METANEPH+NORMETANEPH UR-IMP: NORMAL
METANEPH/CREAT 24H UR: 42 UG/G CRT (ref 0–300)
MONOCYTES # BLD AUTO: 1 THOU/UL (ref 0–0.9)
MONOCYTES NFR BLD AUTO: 7 % (ref 2–10)
NEUTROPHILS # BLD AUTO: 9.1 THOU/UL (ref 2–7.7)
NEUTROPHILS NFR BLD AUTO: 69 % (ref 50–70)
NITRATE UR QL: NEGATIVE
NORMETANEPHRINE 24H UR-MCNC: 64 UG/L
NORMETANEPHRINE 24H UR-MRATE: 155 UG/D (ref 109–393)
NORMETANEPHRINE/CREAT 24H UR: 116 UG/G CRT (ref 0–400)
P AXIS - MUSE: 4 DEGREES
PH UR STRIP: 7 [PH] (ref 5–8)
PLATELET # BLD AUTO: 289 THOU/UL (ref 140–440)
PMV BLD AUTO: 7.6 FL (ref 7–10)
POTASSIUM BLD-SCNC: 4.3 MMOL/L (ref 3.5–5)
PR INTERVAL - MUSE: 104 MS
PROT SERPL-MCNC: 6.5 G/DL (ref 6–8)
QRS DURATION - MUSE: 72 MS
QT - MUSE: 380 MS
QTC - MUSE: 401 MS
R AXIS - MUSE: 31 DEGREES
RBC # BLD AUTO: 5.25 MILL/UL (ref 3.8–5.4)
RENIN PLAS-CCNC: 0.8 NG/ML/HR
SODIUM SERPL-SCNC: 142 MMOL/L (ref 136–145)
SP GR UR STRIP: 1.01 (ref 1–1.03)
SPECIMEN VOL ?TM UR: 2425 ML
SPECIMEN VOL ?TM UR: 2425 ML
SYSTOLIC BLOOD PRESSURE - MUSE: NORMAL MMHG
T AXIS - MUSE: 28 DEGREES
UROBILINOGEN UR STRIP-ACNC: ABNORMAL
VENTRICULAR RATE- MUSE: 67 BPM
WBC: 13.1 THOU/UL (ref 4–11)

## 2018-06-01 ASSESSMENT — MIFFLIN-ST. JEOR: SCORE: 1396.5

## 2018-06-02 LAB — BACTERIA SPEC CULT: NO GROWTH

## 2018-06-04 ENCOUNTER — COMMUNICATION - HEALTHEAST (OUTPATIENT)
Dept: FAMILY MEDICINE | Facility: CLINIC | Age: 33
End: 2018-06-04

## 2018-06-04 ENCOUNTER — PRE VISIT (OUTPATIENT)
Dept: UROLOGY | Facility: CLINIC | Age: 33
End: 2018-06-04

## 2018-06-04 ENCOUNTER — MYC MEDICAL ADVICE (OUTPATIENT)
Dept: ENDOCRINOLOGY | Facility: CLINIC | Age: 33
End: 2018-06-04

## 2018-06-04 DIAGNOSIS — E27.8 ADRENAL MASS (H): Primary | ICD-10-CM

## 2018-06-05 DIAGNOSIS — E27.8 ADRENAL MASS (H): ICD-10-CM

## 2018-06-05 PROCEDURE — 82627 DEHYDROEPIANDROSTERONE: CPT | Performed by: INTERNAL MEDICINE

## 2018-06-05 PROCEDURE — 82024 ASSAY OF ACTH: CPT | Performed by: INTERNAL MEDICINE

## 2018-06-05 PROCEDURE — 36415 COLL VENOUS BLD VENIPUNCTURE: CPT | Performed by: INTERNAL MEDICINE

## 2018-06-06 LAB
ACTH PLAS-MCNC: <10 PG/ML
DHEA-S SERPL-MCNC: 740 UG/DL (ref 35–430)

## 2018-06-07 ENCOUNTER — TELEPHONE (OUTPATIENT)
Dept: UROLOGY | Facility: CLINIC | Age: 33
End: 2018-06-07

## 2018-06-07 ENCOUNTER — RECORDS - HEALTHEAST (OUTPATIENT)
Dept: ADMINISTRATIVE | Facility: OTHER | Age: 33
End: 2018-06-07

## 2018-06-07 ENCOUNTER — OFFICE VISIT (OUTPATIENT)
Dept: UROLOGY | Facility: CLINIC | Age: 33
End: 2018-06-07
Payer: COMMERCIAL

## 2018-06-07 ENCOUNTER — ALLIED HEALTH/NURSE VISIT (OUTPATIENT)
Dept: UROLOGY | Facility: CLINIC | Age: 33
End: 2018-06-07
Payer: COMMERCIAL

## 2018-06-07 VITALS
WEIGHT: 155 LBS | SYSTOLIC BLOOD PRESSURE: 151 MMHG | HEIGHT: 64 IN | DIASTOLIC BLOOD PRESSURE: 102 MMHG | BODY MASS INDEX: 26.46 KG/M2 | HEART RATE: 93 BPM

## 2018-06-07 DIAGNOSIS — F41.9 ANXIETY: ICD-10-CM

## 2018-06-07 DIAGNOSIS — E27.8 ADRENAL MASS (H): Primary | ICD-10-CM

## 2018-06-07 RX ORDER — CEFAZOLIN SODIUM 1 G/50ML
1 INJECTION, SOLUTION INTRAVENOUS SEE ADMIN INSTRUCTIONS
Status: CANCELLED | OUTPATIENT
Start: 2018-06-07

## 2018-06-07 RX ORDER — LORAZEPAM 0.5 MG/1
0.5 TABLET ORAL EVERY 8 HOURS PRN
Qty: 30 TABLET | Refills: 0 | Status: SHIPPED | OUTPATIENT
Start: 2018-06-07 | End: 2018-07-17

## 2018-06-07 ASSESSMENT — PAIN SCALES - GENERAL: PAINLEVEL: NO PAIN (0)

## 2018-06-07 NOTE — MR AVS SNAPSHOT
After Visit Summary   6/7/2018    Suzy Rodríguez    MRN: [id number]           Patient Information     Date Of Birth          1985        Visit Information        Provider Department      6/7/2018 11:00 AM Nurse, [facility] Urology and Inst for Prostate and Urologic Cancers        Today's Diagnoses     Adrenal mass (H)    -  1       Follow-ups after your visit        Your next 10 appointments already scheduled     Jun 14, 2018 10:00 AM CDT   (Arrive by 9:45 AM)   PAC EVALUATION with Bre Pac Jacey 7   [facility] Preoperative Assessment [place] ([facility])    [address] [place] [address] [phone]            Jun 14, 2018 11:00 AM CDT   (Arrive by 10:45 AM)   PAC RN ASSESSMENT with Bre Pac Rn   [facility] Preoperative Assessment [place] ([facility])    [address] [place] [address] [phone]            Jun 14, 2018 11:30 AM CDT   (Arrive by 11:15 AM)   PAC Anesthesia Consult with Bre Pac Anesthesiologist   [facility] Assessment [place] ([facility])    [address] [place] [id number] [phone]            Jun 25, 2018   Procedure with Warren Mansfield MD   [facility] [place], Same Day Surgery (--)    [address] [facility] [address] [phone]            Jul 12, 2018  7:40 AM CDT   (Arrive by 7:25 AM)   Post-Op with Warren Mansfield MD   [facility] Urology and [facility] for Prostate and Urologic Cancers ([facility])    [address] [place] [id number] [phone]              Who to contact     Please call your clinic at [phone] to:    Ask questions about your health    Make or cancel appointments    Discuss your medicines    Learn about your test results    Speak to your doctor            Additional Information About Your Visit         [employer] Information     [employer] gives you secure access to your electronic health record. If you see a primary care provider, you can also send messages to your care team and make appointments. If you have questions, please call your primary care clinic.  If you do not have a primary care provider, please call [phone] and they will assist you.      [employer] is an electronic gateway that provides easy, online access to your medical records. With [employer], you can request a clinic appointment, read your test results, renew a prescription or communicate with your care team.     To access your existing account, please contact your [facility] or call [phone] for assistance.        Care EveryWhere ID     This is your Care EveryWhere ID. This could be used by other organizations to access your [place] medical records  [id number]         Blood Pressure from Last 3 Encounters:   06/07/18 (!) 151/102   05/25/18 138/82   05/23/18 (!) 138/101    Weight from Last 3 Encounters:   06/07/18 70.3 kg (155 lb)   05/25/18 71.6 kg (157 lb 12.8 oz)   05/23/18 70.8 kg (156 lb)              Today, you had the following     No orders found for display         Today's Medication Changes          These changes are accurate as of 6/7/18 11:16 AM.  If you have any questions, ask your nurse or doctor.               Stop taking these medicines if you haven't already. Please contact your care team if you have questions.     dexamethasone 1 MG tablet   Commonly known as:  DECADRON   Stopped by:  Warren Mansfield MD                    Primary Care Provider Office Phone # Fax #    [facility] [phone] [address] [facility] [address]        Equal Access to Services     [facility] Hospital[facility] : Maury Redding, marcela siddiqi, qacoleman aldrich. So [facility] [phone].    ATENCIÓN: Si selinla  español, tiene a lyman disposición servicios gratuitos de asistencia lingüística. Rena mcfadden [phone].    We comply with applicable federal civil rights laws and [place] laws. We do not discriminate on the basis of race, color, national origin, age, disability, sex, sexual orientation, or gender identity.            Thank you!     Thank you for choosing [facility] UROLOGY AND [facility] FOR PROSTATE AND UROLOGIC CANCERS  for your care. Our goal is always to provide you with excellent care. Hearing back from our patients is one way we can continue to improve our services. Please take a few minutes to complete the written survey that you may receive in the mail after your visit with us. Thank you!             Your Updated Medication List - Protect others around you: Learn how to safely use, store and throw away your medicines at www.disposemymeds.org.          This list is accurate as of 6/7/18 11:16 AM.  Always use your most recent med list.                   Brand Name Dispense Instructions for use Diagnosis    * buPROPion 100 MG tablet    WELLBUTRIN     TAKE 1 TABLET (100 MG TOTAL) BY MOUTH 2 (TWO) TIMES A DAY.        * buPROPion 100 MG tablet    WELLBUTRIN     Take 100 mg by mouth        calcium carbonate antacid 1000 MG Chew           EPINEPHrine 0.3 MG/0.3ML injection 2-pack    EPIPEN/ADRENACLICK/or ANY BX GENERIC EQUIV     As directed        FLUoxetine 20 MG capsule    PROzac     Take 20 mg by mouth        ondansetron 4 MG ODT tab    ZOFRAN-ODT     Place 4 mg under the tongue        ONE-A-DAY WOMENS PO           ORTHO TRI-CYCLEN (28) PO      1 TABLET DAILY        oxyCODONE IR 5 MG tablet    ROXICODONE     Take 5-10 mg by mouth        SPIRONOLACTONE PO      Take 25 mg by mouth        tolterodine 2 MG tablet    DETROL     Take 2 mg by mouth        VITAMIN D-1000 MAX ST 1000 units Tabs   Generic drug:  cholecalciferol      Take 2,000 Units by mouth        * Notice:  This list has 2 medication(s) that are the same as  other medications prescribed for you. Read the directions carefully, and ask your doctor or other care provider to review them with you.

## 2018-06-07 NOTE — PROGRESS NOTES
Chief Complaint:   Adrenal Mass         Consult or Referral:     Mr. Suzy Rodríguez is a 32 year old female seen in consultation from Dr. Patel.         History of Present Illness:    Suzy Rodríguez is a very pleasant 32 year old female who presents with a history of Adrenal Mass.  This was discovered incidentally in the context of nephrolithiasis (she is s/p ureteroscopy).  Initially diagnosed about 1 month(s) ago.  The mass is large measuring 9 cm and extends far posterior to the liver and abuts the vena cava and the diaphragm.    She states that over the past year she has noticed weight gain, excess hair growth, and rounding of the face. She also notes a recent stress fracture in the pelvic bone resulting from running. She also experiences joint pain and peripheral neuropathy on the left side    Her PMH is also significant for PCOS and MTHFR clotting disorder.     Also noted to have a liver lesion likely hemangioma       Stress fracture in pelvic bone, from running    Joint pain while running    Increase in depressive mood     Blurry vision and headaches accompanied by elevated BP     Numbness/tingling in left side while running    Mass is secreting cortisol per Dr. Beverly in her endocrinology work up.    From a kidney stone perspective she underwent ureteroscopy, recovering well, stent has been removed, no residual stone burden          ECOG Performance Score: 0  0=Fully active, 1=Unable to do strenuous activity but capable of light work, 2=Ambulatory and capable of all selfcare, 3=Capable of limited selfcare, confined to bed >50% of waking hours, 4=Completely disabled.           Past Medical History:   No past medical history on file.   PCOS  Nephrolithiasis   MTHFR clotting disorder  Adrenal Mass          Past Surgical History:   No past surgical history on file.   C section x2  Gastric bypass 2 years ago  Ureteroscopy          Medications     Current Outpatient Prescriptions   Medication      "buPROPion (WELLBUTRIN) 100 MG tablet     buPROPion (WELLBUTRIN) 100 MG tablet     calcium carbonate antacid 1000 MG CHEW     cholecalciferol (VITAMIN D-1000 MAX ST) 1000 units TABS     EPINEPHrine (EPIPEN/ADRENACLICK/OR ANY BX GENERIC EQUIV) 0.3 MG/0.3ML injection 2-pack     FLUoxetine (PROZAC) 20 MG capsule     LORazepam (ATIVAN) 0.5 MG tablet     Multiple Vitamins-Calcium (ONE-A-DAY WOMENS PO)     ondansetron (ZOFRAN-ODT) 4 MG ODT tab     ORTHO TRI-CYCLEN (28) OR     oxyCODONE IR (ROXICODONE) 5 MG tablet     SPIRONOLACTONE PO     tolterodine (DETROL) 2 MG tablet     No current facility-administered medications for this visit.             Family History:   No family history on file.         Social History:     Social History     Social History     Marital status:      Spouse name: N/A     Number of children: N/A     Years of education: N/A     Occupational History     Not on file.     Social History Main Topics     Smoking status: Never Smoker     Smokeless tobacco: Never Used     Alcohol use Yes      Comment: occ.     Drug use: No     Sexual activity: No     Other Topics Concern     Not on file     Social History Narrative            Allergies:   Bee venom and Ibuprofen         Review of Systems:  From intake questionnaire     Negative 14 system review except as noted on HPI, nurse's note.         Physical Exam:     Patient is a 32 year old  female   Vitals: Blood pressure (!) 151/102, pulse 93, height 1.626 m (5' 4\"), weight 70.3 kg (155 lb).  General Appearance Adult: Alert, no acute distress, oriented  HENT: throat/mouth:normal, good dentition  Lungs: no respiratory distress, or pursed lip breathing  Heart: No obvious jugular venous distension present  Abdomen: soft, nontender, no organomegaly or masses, Body mass index is 26.61 kg/(m^2).  Musculoskeltal: extremities normal, no peripheral edema  Skin: no suspicious lesions or rashes  Neuro: Alert, oriented, speech and mentation normal  Psych: affect and " mood normal  Gait: Normal  : Deferred      Labs and Pathology:    I reviewed all applicable laboratory and pathology data reviewed with patient  Significant for     CR 0.80 (5/8/18) from Care Everywhere        Imaging:    I reviewed all applicable imaging and went over the results with the patient.    Significant for CT Abd/Pelvis 5/8/18:    FINDINGS:  LUNG BASES: Trace pericardial fluid.    ABDOMEN: 9.2 x 8 cm heterogeneous right upper quadrant mass with small foci of  calcification within it which is likely arising from the adrenal gland though  inseparable from liver and upper pole of right kidney. It is incompletely  evaluated on this noncontrast study. 3 x 2.6 cm mass right lobe of liver on  image #85 also incompletely evaluated. 6 x 4 x 4 mm upper third left ureteral  obstructing stone with mild to moderate secondary hydronephrosis. Collection of  stones at lower pole left kidney extending over an area of approximately 6 x 7 x  4 mm. Additional nonobstructing 1 mm stone upper pole left kidney. 2 mm  nonobstructing stone noted upper pole right kidney with no hydronephrosis on the  right. Postop change consistent with may still further evaluate.  There is a little tricky to see the correlation and noticed immediately patient's initially no shocks at up because he is okay bypass. Noncontrast images of  spleen, left adrenal gland, gallbladder, and pancreas unremarkable. No aneurysm.  No adenopathy.    PELVIS: Normal appendix. Diverticulosis without diverticulitis. Small  fat-containing paraumbilical hernia.    MUSCULOSKELETAL: Abnormal soft tissue density adjacent to left rectus abdominis  muscle extending into subcutaneous fat of uncertain significance. Degenerative  change.    CONCLUSION:  1.  6 x 4 x 4 mm left upper third ureteral obstructing stone with mild to  moderate secondary hydronephrosis. Additional bilateral nonobstructive  nephrolithiasis.  2.  Indeterminate mass right upper quadrant likely arising  from the adrenal  gland though inseparable from liver and kidney. Comparison with prior  recommended. Additional indeterminate mass within the liver.  3.  Diverticulosis without diverticulitis.  4.  Small fat-containing paraumbilical hernia.  5.  Abnormal soft tissue density in subcutaneous fat adjacent to left rectus  abdominis muscle. Correlate with prior.      Outside and Past Medical records:    I spent 10 minutes reviewing outside and past medical records.           Assessment and Plan:     Assessment:  Right Adrenal Mass     Plan:  We had an extensive discussion about the significance of a localized adrenal mass.We discussed the options for treatment  including active surveillance and surgical extirpation. We also discussed the advantages and disadvantages and roles of open surgery vs. laparoscopic (and Da Geoff assisted) surgery.  This mass is too large for observation, plus it is secreting cortisol and causing and Cushing's syndrome    The anticipated post-operative course was explained, including an anticipated 3-4 day hospital stay.    Patient has decided she would like to proceed with an open right adrenalectomy.    The risks, benefits, alternatives, and personnel involved in the procudure were discussed.  All questions were answered.  A written informed consent will be finalized on the morning of the procedure.    Will prescribe Ativan for associated anxiety, per patient's request    We will get a CT of the chest to rule out any metastatic disease    Orders  Orders Placed This Encounter   Procedures     Caitlyn-Operative Worksheet  (Urology General)     CT Chest w/o & w Contrast     PAC Visit Referral (For [facility] Only)       F/U:   Surgery    Scribe Disclosure:   Camacho LEMUS, am serving as a scribe; to document services personally performed by Warren Mansfield MD based on data collection and the provider's statements to me.     Warren Mansfield MD  Department of Urology Staff  [facility]  "[place]    Attestation:  This patient was seen and evaluated by me, with a scribe taking notes.  I have reviewed the note above and agree.  The physical exam and or any procedures were performed by me and the pertinant details are outlined below.     Patient is a 32 year old  female   Vitals: Blood pressure (!) 151/102, pulse 93, height 1.626 m (5' 4\"), weight 70.3 kg (155 lb).  General Appearance Adult: Alert, no acute distress, oriented    Assessment:  Right Adrenal Mass     Plan:  We had an extensive discussion about the significance of a localized adrenal mass.We discussed the options for treatment  including active surveillance and surgical extirpation. We also discussed the advantages and disadvantages and roles of open surgery vs. laparoscopic (and Da Geoff assisted) surgery.  This mass is too large for observation, plus it is secreting cortisol and causing and Cushing's syndrome    The anticipated post-operative course was explained, including an anticipated 3-4 day hospital stay.    Patient has decided she would like to proceed with an open right adrenalectomy.    The risks, benefits, alternatives, and personnel involved in the procudure were discussed.  All questions were answered.  A written informed consent will be finalized on the morning of the procedure.    Will prescribe Ativan for associated anxiety, per patient's request    We will get a CT of the chest to rule out any metastatic disease    Warren Mansfield MD  Department of Urology  [facility]      Note, dictation software may have been used for this note and the content was not proofread for accuracy      CC  Patient Care Team:  Clinic, [facility] as PCP - Genny Robertson, RN as Registered Nurse (Urology)  Warren Mansfield MD as MD (Urology)  [facility] as Referring Physician  ALEXIS KHAN    Copy to patient  VALERY PANDYA  [address]  [facility] [address]        "

## 2018-06-07 NOTE — PATIENT INSTRUCTIONS
Schedule surgery with Dr. Mansfield.    schedule chest CT today      It was a pleasure meeting with you today.  Thank you for allowing me and my team the privilege of caring for you today.  YOU are the reason we are here, and I truly hope we provided you with the excellent service you deserve.  Please let us know if there is anything else we can do for you so that we can be sure you are leaving completely satisfied with your care experience.        LARRY MichaelA

## 2018-06-07 NOTE — TELEPHONE ENCOUNTER
Surgery scheduled on 6/25/18 @ 3:05 pm @ [facility] OR with Dr Mansfield.  Surgery packet given in clinic during surgery teaching with RN care coordinator Genny.

## 2018-06-07 NOTE — MR AVS SNAPSHOT
After Visit Summary   6/7/2018    Suzy Rodríguez    MRN: [id number]           Patient Information     Date Of Birth          1985        Visit Information        Provider Department      6/7/2018 9:20 AM Warren Mansfield MD [facility] and [facility] Prostate and Urologic Cancers        Today's Diagnoses     Adrenal mass (H)    -  1    Anxiety          Care Instructions    Schedule surgery with Dr. Mansfield.    schedule chest CT today      It was a pleasure meeting with you today.  Thank you for allowing me and my team the privilege of caring for you today.  YOU are the reason we are here, and I truly hope we provided you with the excellent service you deserve.  Please let us know if there is anything else we can do for you so that we can be sure you are leaving completely satisfied with your care experience.        SHAZIA Michael          Follow-ups after your visit        Additional Services     PAC Visit Referral (For [facility] Only)       Does this visit require an Anesthesia consult?  YES    H&P done by:  [facility]        Please be aware that coverage of these services is subject to the terms and limitations of your health insurance plan.  Call member services at your health plan with any benefit or coverage questions.      Please bring the following to your appointment:  >>   Any x-rays, CTs or MRIs which have been performed.  Contact the facility where they were done to arrange for  prior to your scheduled appointment.  Any new CT, MRI or other procedures ordered by your specialist must be performed at a [place] facility or coordinated by your clinic's referral office.    >>   List of current medications  >>   This referral request   >>   Any documents/labs given to you for this referral                  Your next 10 appointments already scheduled     Jun 14, 2018  8:40 AM CDT   CT CHEST W/O & W CONTRAST with UCCT2   [facility] ([facility])    [address] [place] [address]  [facility] [address]   [phone]           Please bring any scans or X-rays taken at other hospitals, if similar tests were done. Also bring a list of your medicines, including vitamins, minerals and over-the-counter drugs. It is safest to leave personal items at home.  Be sure to tell your doctor:   If you have any allergies.   If there s any chance you are pregnant.   If you are breastfeeding.    If you have diabetes as your medication may need to be adjusted for this exam.  You will have contrast for this exam. To prepare:   Do not eat or drink for 2 hours before your exam. If you need to take medicine, you may take it with small sips of water. (We may ask you to take liquid medicine as well.)   The day before your exam, drink extra fluids at least six 8-ounce glasses (unless your doctor tells you to restrict your fluids).  Patients over 70 or patients with diabetes or kidney problems:   If you haven t had a blood test (creatinine test) within the last 30 days, the Cardiologist/Radiologist may require you to get this test prior to your exam.  Please wear loose clothing, such as a sweat suit or jogging clothes. Avoid snaps, zippers and other metal. We may ask you to undress and put on a hospital gown.  If you have any questions, please call the Imaging Department where you will have your exam.            Jun 14, 2018 10:00 AM CDT   (Arrive by 9:45 AM)   PAC EVALUATION with Bre Pac Jacey 7   [facility] [place] ([facility])    [address] [place] [address]  [facility] [address]   [phone]            Jun 14, 2018 11:00 AM CDT   (Arrive by 10:45 AM)   PAC RN ASSESSMENT with Bre Pac Rn   [facility] [place] ([facility])    [address] [place] [address]   [phone]            Jun 14, 2018 11:30 AM CDT   (Arrive by 11:15 AM)   PAC  Anesthesia Consult with  Pac Anesthesiologist   [facility] ([facility])    [address] [facility] [address]  4th Floor  [facility] [id number] [phone]            Jun 25, 2018   Procedure with Warren Mansfield MD   [facility], [place], Same Day Surgery (--)    [address]  [facility] [address]   [phone]            Jul 12, 2018  7:40 AM CDT   (Arrive by 7:25 AM)   Post-Op with Warren Mansfield MD   [facility] and [facility] for Prostate and Urologic Cancers ([facility])    [address]  [address] [facility] [id number] [phone]              Future tests that were ordered for you today     Open Future Orders        Priority Expected Expires Ordered    CT Chest w/o & w Contrast STAT  6/8/2019 6/7/2018            Who to contact     Please call your clinic at [phone] to:    Ask questions about your health    Make or cancel appointments    Discuss your medicines    Learn about your test results    Speak to your doctor            Additional Information About Your Visit        [employer] Information     [employer] gives you secure access to your electronic health record. If you see a primary care provider, you can also send messages to your care team and make appointments. If you have questions, please call your primary care clinic.  If you do not have a primary care provider, please call [phone] and they will assist you.      [employer] is an electronic gateway that provides easy, online access to your medical records. With [employer], you can request a clinic appointment, read your test results, renew a prescription or communicate with your care team.     To access your existing account, please contact your [facility] or call [phone] for assistance.        Care EveryWhere ID     This is your Care EveryWhere ID. This could be used by other organizations to access your [place]  "medical records  [id number]        Your Vitals Were     Pulse Height BMI (Body Mass Index)             93 1.626 m (5' 4\") 26.61 kg/m2          Blood Pressure from Last 3 Encounters:   06/07/18 (!) 151/102   05/25/18 138/82   05/23/18 (!) 138/101    Weight from Last 3 Encounters:   06/07/18 70.3 kg (155 lb)   05/25/18 71.6 kg (157 lb 12.8 oz)   05/23/18 70.8 kg (156 lb)              We Performed the Following     PAC Visit Referral (For [facility] Only)     Caitlyn-Operative Worksheet  (Urology General)          Today's Medication Changes          These changes are accurate as of 6/7/18 11:48 AM.  If you have any questions, ask your nurse or doctor.               Start taking these medicines.        Dose/Directions    LORazepam 0.5 MG tablet   Commonly known as:  ATIVAN   Used for:  Adrenal mass (H), Anxiety   Started by:  Warren Mansfield MD        Dose:  0.5 mg   Take 1 tablet (0.5 mg) by mouth every 8 hours as needed for anxiety   Quantity:  30 tablet   Refills:  0         Stop taking these medicines if you haven't already. Please contact your care team if you have questions.     dexamethasone 1 MG tablet   Commonly known as:  DECADRON   Stopped by:  Warren Mansfield MD                Where to get your medicines      Some of these will need a paper prescription and others can be bought over the counter.  Ask your nurse if you have questions.     Bring a paper prescription for each of these medications     LORazepam 0.5 MG tablet                Primary Care Provider Office Phone # Fax #    [facility] [phone]       [facility] [address]        Equal Access to Services     ALYCIA VALADEZ : Maury Redding, wajavida luqjason, qaybta kaalcoleman segura. So [facility] [phone].    ATENCIÓN: Si habla español, tiene a lyman disposición servicios gratuitos de asistencia lingüística. Llame al " [phone].    We comply with applicable federal civil rights laws and [place] laws. We do not discriminate on the basis of race, color, national origin, age, disability, sex, sexual orientation, or gender identity.            Thank you!     Thank you for choosing [facility] UROLOGY AND [facility] FOR PROSTATE AND UROLOGIC CANCERS  for your care. Our goal is always to provide you with excellent care. Hearing back from our patients is one way we can continue to improve our services. Please take a few minutes to complete the written survey that you may receive in the mail after your visit with us. Thank you!             Your Updated Medication List - Protect others around you: Learn how to safely use, store and throw away your medicines at www.disposemymeds.org.          This list is accurate as of 6/7/18 11:48 AM.  Always use your most recent med list.                   Brand Name Dispense Instructions for use Diagnosis    * buPROPion 100 MG tablet    WELLBUTRIN     TAKE 1 TABLET (100 MG TOTAL) BY MOUTH 2 (TWO) TIMES A DAY.        * buPROPion 100 MG tablet    WELLBUTRIN     Take 100 mg by mouth        calcium carbonate antacid 1000 MG Chew           EPINEPHrine 0.3 MG/0.3ML injection 2-pack    EPIPEN/ADRENACLICK/or ANY BX GENERIC EQUIV     As directed        FLUoxetine 20 MG capsule    PROzac     Take 20 mg by mouth        LORazepam 0.5 MG tablet    ATIVAN    30 tablet    Take 1 tablet (0.5 mg) by mouth every 8 hours as needed for anxiety    Adrenal mass (H), Anxiety       ondansetron 4 MG ODT tab    ZOFRAN-ODT     Place 4 mg under the tongue        ONE-A-DAY WOMENS PO           ORTHO TRI-CYCLEN (28) PO      1 TABLET DAILY        oxyCODONE IR 5 MG tablet    ROXICODONE     Take 5-10 mg by mouth        SPIRONOLACTONE PO      Take 25 mg by mouth        tolterodine 2 MG tablet    DETROL     Take 2 mg by mouth        VITAMIN D-1000 MAX ST 1000 units Tabs   Generic drug:  cholecalciferol      Take 2,000 Units by mouth        *  Notice:  This list has 2 medication(s) that are the same as other medications prescribed for you. Read the directions carefully, and ask your doctor or other care provider to review them with you.

## 2018-06-07 NOTE — LETTER
6/7/2018       RE: Suzy Rodríguez  [address]  [facility] [address]     Dear Colleague,    Thank you for referring your patient, Suzy Rodríguez, to the [facility] UROLOGY AND INST FOR PROSTATE AND UROLOGIC CANCERS at [facility]. Please see a copy of my visit note below.          Chief Complaint:   Adrenal Mass         Consult or Referral:     Mr. Suzy Rodríguez is a 32 year old female seen in consultation from Dr. Patel.         History of Present Illness:    Suzy Rodríguez is a very pleasant 32 year old female who presents with a history of Adrenal Mass.  This was discovered incidentally in the context of nephrolithiasis (she is s/p ureteroscopy).  Initially diagnosed about 1 month(s) ago.  The mass is large measuring 9 cm and extends far posterior to the liver and abuts the vena cava and the diaphragm.    She states that over the past year she has noticed weight gain, excess hair growth, and rounding of the face. She also notes a recent stress fracture in the pelvic bone resulting from running. She also experiences joint pain and peripheral neuropathy on the left side    Her PMH is also significant for PCOS and MTHFR clotting disorder.     Also noted to have a liver lesion likely hemangioma       Stress fracture in pelvic bone, from running    Joint pain while running    Increase in depressive mood     Blurry vision and headaches accompanied by elevated BP     Numbness/tingling in left side while running    Mass is secreting cortisol per Dr. Beverly in her endocrinology work up.    From a kidney stone perspective she underwent ureteroscopy, recovering well, stent has been removed, no residual stone burden          ECOG Performance Score: 0  0=Fully active, 1=Unable to do strenuous activity but capable of light work, 2=Ambulatory and capable of all selfcare, 3=Capable of limited selfcare, confined to bed >50% of waking hours, 4=Completely disabled.           Past Medical  "History:   No past medical history on file.   PCOS  Nephrolithiasis   MTHFR clotting disorder  Adrenal Mass          Past Surgical History:   No past surgical history on file.   C section x2  Gastric bypass 2 years ago  Ureteroscopy          Medications     Current Outpatient Prescriptions   Medication     buPROPion (WELLBUTRIN) 100 MG tablet     buPROPion (WELLBUTRIN) 100 MG tablet     calcium carbonate antacid 1000 MG CHEW     cholecalciferol (VITAMIN D-1000 MAX ST) 1000 units TABS     EPINEPHrine (EPIPEN/ADRENACLICK/OR ANY BX GENERIC EQUIV) 0.3 MG/0.3ML injection 2-pack     FLUoxetine (PROZAC) 20 MG capsule     LORazepam (ATIVAN) 0.5 MG tablet     Multiple Vitamins-Calcium (ONE-A-DAY WOMENS PO)     ondansetron (ZOFRAN-ODT) 4 MG ODT tab     ORTHO TRI-CYCLEN (28) OR     oxyCODONE IR (ROXICODONE) 5 MG tablet     SPIRONOLACTONE PO     tolterodine (DETROL) 2 MG tablet     No current facility-administered medications for this visit.             Family History:   No family history on file.         Social History:     Social History     Social History     Marital status:      Spouse name: N/A     Number of children: N/A     Years of education: N/A     Occupational History     Not on file.     Social History Main Topics     Smoking status: Never Smoker     Smokeless tobacco: Never Used     Alcohol use Yes      Comment: occ.     Drug use: No     Sexual activity: No     Other Topics Concern     Not on file     Social History Narrative            Allergies:   Bee venom and Ibuprofen         Review of Systems:  From intake questionnaire     Negative 14 system review except as noted on HPI, nurse's note.         Physical Exam:     Patient is a 32 year old  female   Vitals: Blood pressure (!) 151/102, pulse 93, height 1.626 m (5' 4\"), weight 70.3 kg (155 lb).  General Appearance Adult: Alert, no acute distress, oriented  HENT: throat/mouth:normal, good dentition  Lungs: no respiratory distress, or pursed lip " breathing  Heart: No obvious jugular venous distension present  Abdomen: soft, nontender, no organomegaly or masses, Body mass index is 26.61 kg/(m^2).  Musculoskeltal: extremities normal, no peripheral edema  Skin: no suspicious lesions or rashes  Neuro: Alert, oriented, speech and mentation normal  Psych: affect and mood normal  Gait: Normal  : Deferred      Labs and Pathology:    I reviewed all applicable laboratory and pathology data reviewed with patient  Significant for     CR 0.80 (5/8/18) from Care Everywhere        Imaging:    I reviewed all applicable imaging and went over the results with the patient.    Significant for CT Abd/Pelvis 5/8/18:    FINDINGS:  LUNG BASES: Trace pericardial fluid.    ABDOMEN: 9.2 x 8 cm heterogeneous right upper quadrant mass with small foci of  calcification within it which is likely arising from the adrenal gland though  inseparable from liver and upper pole of right kidney. It is incompletely  evaluated on this noncontrast study. 3 x 2.6 cm mass right lobe of liver on  image #85 also incompletely evaluated. 6 x 4 x 4 mm upper third left ureteral  obstructing stone with mild to moderate secondary hydronephrosis. Collection of  stones at lower pole left kidney extending over an area of approximately 6 x 7 x  4 mm. Additional nonobstructing 1 mm stone upper pole left kidney. 2 mm  nonobstructing stone noted upper pole right kidney with no hydronephrosis on the  right. Postop change consistent with may still further evaluate.  There is a little tricky to see the correlation and noticed immediately patient's initially no shocks at up because he is okay bypass. Noncontrast images of  spleen, left adrenal gland, gallbladder, and pancreas unremarkable. No aneurysm.  No adenopathy.    PELVIS: Normal appendix. Diverticulosis without diverticulitis. Small  fat-containing paraumbilical hernia.    MUSCULOSKELETAL: Abnormal soft tissue density adjacent to left rectus abdominis  muscle  extending into subcutaneous fat of uncertain significance. Degenerative  change.    CONCLUSION:  1.  6 x 4 x 4 mm left upper third ureteral obstructing stone with mild to  moderate secondary hydronephrosis. Additional bilateral nonobstructive  nephrolithiasis.  2.  Indeterminate mass right upper quadrant likely arising from the adrenal  gland though inseparable from liver and kidney. Comparison with prior  recommended. Additional indeterminate mass within the liver.  3.  Diverticulosis without diverticulitis.  4.  Small fat-containing paraumbilical hernia.  5.  Abnormal soft tissue density in subcutaneous fat adjacent to left rectus  abdominis muscle. Correlate with prior.      Outside and Past Medical records:    I spent 10 minutes reviewing outside and past medical records.           Assessment and Plan:     Assessment:  Right Adrenal Mass     Plan:  We had an extensive discussion about the significance of a localized adrenal mass.We discussed the options for treatment  including active surveillance and surgical extirpation. We also discussed the advantages and disadvantages and roles of open surgery vs. laparoscopic (and Da Geoff assisted) surgery.  This mass is too large for observation, plus it is secreting cortisol and causing and Cushing's syndrome    The anticipated post-operative course was explained, including an anticipated 3-4 day hospital stay.    Patient has decided she would like to proceed with an open right adrenalectomy.    The risks, benefits, alternatives, and personnel involved in the procudure were discussed.  All questions were answered.  A written informed consent will be finalized on the morning of the procedure.    Will prescribe Ativan for associated anxiety, per patient's request    We will get a CT of the chest to rule out any metastatic disease    Orders  Orders Placed This Encounter   Procedures     Caitlyn-Operative Worksheet  (Urology General)     CT Chest w/o & w Contrast     PAC Visit  "Referral (For [facility] Only)       F/U:   Surgery    Scribe Disclosure:   I,Marjoriesergioruth Goyo, am serving as a scribe; to document services personally performed by Warren Mansfield MD based on data collection and the provider's statements to me.     Warren Mansfiled MD  Department of Urology Staff  [facility]    Attestation:  This patient was seen and evaluated by me, with a scribe taking notes.  I have reviewed the note above and agree.  The physical exam and or any procedures were performed by me and the pertinant details are outlined below.     Patient is a 32 year old  female   Vitals: Blood pressure (!) 151/102, pulse 93, height 1.626 m (5' 4\"), weight 70.3 kg (155 lb).  General Appearance Adult: Alert, no acute distress, oriented    Assessment:  Right Adrenal Mass     Plan:  We had an extensive discussion about the significance of a localized adrenal mass.We discussed the options for treatment  including active surveillance and surgical extirpation. We also discussed the advantages and disadvantages and roles of open surgery vs. laparoscopic (and Da Geoff assisted) surgery.  This mass is too large for observation, plus it is secreting cortisol and causing and Cushing's syndrome    The anticipated post-operative course was explained, including an anticipated 3-4 day hospital stay.    Patient has decided she would like to proceed with an open right adrenalectomy.    The risks, benefits, alternatives, and personnel involved in the procudure were discussed.  All questions were answered.  A written informed consent will be finalized on the morning of the procedure.    Will prescribe Ativan for associated anxiety, per patient's request    We will get a CT of the chest to rule out any metastatic disease    Warren Mansfield MD  Department of Urology  [facility]      Note, dictation software may have been used for this note and the content was not proofread for " accuracy      CC  Patient Care Team:  Clinic, [facility] as PCP - General  Genny Mccrya, RN as Registered Nurse (Urology)  Clinic, [facility] as Referring Physician  ALEXIS KHAN    Copy to patient  VALERY PANDYA  [address]  [facility] [address]

## 2018-06-07 NOTE — NURSING NOTE
Pre Op Teaching Flowsheet       Pre and Post op Patient Education  Relevant Diagnosis:  Right adrenal mass  Surgical procedure:  Right Adrenalectomy and possible lymph node dissection  Teaching Topic:  Pre and post op teaching  Person Involved in teaching: Suzy Rodríguez    Motivation Level:  Asks Questions: Yes  Eager to Learn:  Yes  Cooperative: Yes  Receptive (willing/able to accept information):  Yes    Patient demonstrates understanding of the following:  Date of surgery:  6/25/18  Location of surgery:  [address] [place] [address]  History and Physical and any other testing necessary prior to surgery: Yes  Required time line for completion of History and Physical and any pre-op testing: Yes    Patient demonstrates understanding of the following:  Pre-op bowel prep:  None needed  Pre-op showering/scrub information with PCMX Soap: Yes  Blood thinner medications discussed and when to stop (if applicable):  Yes      Infection Prevention:   Patient demonstrates understanding of the following:  Surgical procedure site care taught: at time of discharge  Signs and symptoms of infection taught:  Yes      Post-op follow-up:  Discussed how to contact the hospital, nurse, and clinic scheduling staff if necessary.    Instructional materials used/given/mailed:  [place] Surgery Booklet, post op teaching sheet, Map, Soap, and arrival/location information.    Surgical instructions packet given to patient in office:  Yes.    Patient has  and someone to stay with her for the first 24 hours.

## 2018-06-08 ENCOUNTER — OFFICE VISIT - HEALTHEAST (OUTPATIENT)
Dept: SURGERY | Facility: CLINIC | Age: 33
End: 2018-06-08

## 2018-06-08 DIAGNOSIS — E24.0 CUSHING DISEASE (H): ICD-10-CM

## 2018-06-08 DIAGNOSIS — K91.2 POSTOPERATIVE MALABSORPTION: ICD-10-CM

## 2018-06-08 DIAGNOSIS — Z98.84 HX OF GASTRIC BYPASS: ICD-10-CM

## 2018-06-08 ASSESSMENT — MIFFLIN-ST. JEOR: SCORE: 1394.4

## 2018-06-14 ENCOUNTER — OFFICE VISIT (OUTPATIENT)
Dept: SURGERY | Facility: CLINIC | Age: 33
End: 2018-06-14
Payer: COMMERCIAL

## 2018-06-14 ENCOUNTER — RADIANT APPOINTMENT (OUTPATIENT)
Dept: CT IMAGING | Facility: CLINIC | Age: 33
End: 2018-06-14
Attending: UROLOGY
Payer: COMMERCIAL

## 2018-06-14 ENCOUNTER — RECORDS - HEALTHEAST (OUTPATIENT)
Dept: ADMINISTRATIVE | Facility: OTHER | Age: 33
End: 2018-06-14

## 2018-06-14 ENCOUNTER — APPOINTMENT (OUTPATIENT)
Dept: SURGERY | Facility: CLINIC | Age: 33
End: 2018-06-14
Payer: COMMERCIAL

## 2018-06-14 ENCOUNTER — ANESTHESIA EVENT (OUTPATIENT)
Dept: SURGERY | Facility: CLINIC | Age: 33
DRG: 614 | End: 2018-06-14
Payer: COMMERCIAL

## 2018-06-14 ENCOUNTER — ALLIED HEALTH/NURSE VISIT (OUTPATIENT)
Dept: SURGERY | Facility: CLINIC | Age: 33
End: 2018-06-14
Payer: COMMERCIAL

## 2018-06-14 ENCOUNTER — COMMUNICATION - HEALTHEAST (OUTPATIENT)
Dept: FAMILY MEDICINE | Facility: CLINIC | Age: 33
End: 2018-06-14

## 2018-06-14 VITALS
BODY MASS INDEX: 26.62 KG/M2 | WEIGHT: 159.8 LBS | DIASTOLIC BLOOD PRESSURE: 103 MMHG | OXYGEN SATURATION: 99 % | HEART RATE: 81 BPM | SYSTOLIC BLOOD PRESSURE: 145 MMHG | TEMPERATURE: 98.6 F | HEIGHT: 65 IN

## 2018-06-14 DIAGNOSIS — Z01.818 PREOP GENERAL PHYSICAL EXAM: ICD-10-CM

## 2018-06-14 DIAGNOSIS — Z01.818 PREOP GENERAL PHYSICAL EXAM: Primary | ICD-10-CM

## 2018-06-14 DIAGNOSIS — E27.8 ADRENAL MASS (H): ICD-10-CM

## 2018-06-14 LAB
ALBUMIN SERPL-MCNC: 3.7 G/DL (ref 3.4–5)
ALP SERPL-CCNC: 106 U/L (ref 40–150)
ALT SERPL W P-5'-P-CCNC: 28 U/L (ref 0–50)
ANION GAP SERPL CALCULATED.3IONS-SCNC: 8 MMOL/L (ref 3–14)
AST SERPL W P-5'-P-CCNC: 17 U/L (ref 0–45)
BILIRUB SERPL-MCNC: 0.4 MG/DL (ref 0.2–1.3)
BUN SERPL-MCNC: 13 MG/DL (ref 7–30)
CALCIUM SERPL-MCNC: 8.7 MG/DL (ref 8.5–10.1)
CHLORIDE SERPL-SCNC: 104 MMOL/L (ref 94–109)
CO2 SERPL-SCNC: 27 MMOL/L (ref 20–32)
CREAT SERPL-MCNC: 0.66 MG/DL (ref 0.52–1.04)
ERYTHROCYTE [DISTWIDTH] IN BLOOD BY AUTOMATED COUNT: 14.4 % (ref 10–15)
GFR SERPL CREATININE-BSD FRML MDRD: >90 ML/MIN/1.7M2
GLUCOSE SERPL-MCNC: 76 MG/DL (ref 70–99)
HCT VFR BLD AUTO: 47.3 % (ref 35–47)
HGB BLD-MCNC: 15.8 G/DL (ref 11.7–15.7)
INR PPP: 0.99 (ref 0.86–1.14)
MCH RBC QN AUTO: 30.4 PG (ref 26.5–33)
MCHC RBC AUTO-ENTMCNC: 33.4 G/DL (ref 31.5–36.5)
MCV RBC AUTO: 91 FL (ref 78–100)
PLATELET # BLD AUTO: 245 10E9/L (ref 150–450)
POTASSIUM SERPL-SCNC: 4 MMOL/L (ref 3.4–5.3)
PROT SERPL-MCNC: 6.7 G/DL (ref 6.8–8.8)
RBC # BLD AUTO: 5.2 10E12/L (ref 3.8–5.2)
SODIUM SERPL-SCNC: 138 MMOL/L (ref 133–144)
WBC # BLD AUTO: 11.7 10E9/L (ref 4–11)

## 2018-06-14 RX ORDER — LORAZEPAM 0.5 MG/1
0.5 TABLET ORAL
COMMUNITY
Start: 2018-06-07 | End: 2018-06-14

## 2018-06-14 RX ORDER — IOPAMIDOL 755 MG/ML
76 INJECTION, SOLUTION INTRAVASCULAR ONCE
Status: COMPLETED | OUTPATIENT
Start: 2018-06-14 | End: 2018-06-14

## 2018-06-14 RX ADMIN — IOPAMIDOL 76 ML: 755 INJECTION, SOLUTION INTRAVASCULAR at 08:47

## 2018-06-14 NOTE — ANESTHESIA PREPROCEDURE EVALUATION
Anesthesia Evaluation     . Pt has had prior anesthetic. Type: General and MAC           ROS/MED HX    ENT/Pulmonary:  - neg pulmonary ROS     Neurologic:  - neg neurologic ROS     Cardiovascular:  - neg cardiovascular ROS   (+) ----. : . . . :. . No previous cardiac testing       METS/Exercise Tolerance:  >4 METS   Hematologic:         Musculoskeletal:  - neg musculoskeletal ROS       GI/Hepatic:  - neg GI/hepatic ROS       Renal/Genitourinary:     (+) Nephrolithiasis ,       Endo:     (+) Other Endocrine Disorder adenal mass, elevated corisol level.      Psychiatric:  - neg psychiatric ROS       Infectious Disease:  - neg infectious disease ROS       Malignancy:   (+) Malignancy History of Other  Other CA adrenal Active status post         Other:    (+) No chance of pregnancy C-spine cleared: N/A, no H/O Chronic Pain,no other significant disability                    Physical Exam  Normal systems: cardiovascular, pulmonary and dental    Airway   Mallampati: III  TM distance: >3 FB  Neck ROM: full    Dental     Cardiovascular   Rhythm and rate: regular and normal      Pulmonary    breath sounds clear to auscultation    Other findings:   Results for VALERY PANDYA (MRN [id number]) as of 6/14/2018 12:14    5/28/2018 06:30  Creat/24hr: 1334  Creat/Vol: 55  Cortisol Free Duration Urine: 24  Cortisol Free Urine: 351.6 (H)  Cortisol Free Urine Intrepretation: SEE NOTE  Cortisol Free Urine Random: 145.00  Cortisol ug/g creatinine: 263.64  METANEPHRINE RANDOM OR 24 HR URINE: Rpt         Results for VALERY PANDYA (MRN [id number]) as of 6/14/2018 12:14    6/14/2018 10:52  Sodium: 138  Potassium: 4.0  Chloride: 104  Carbon Dioxide: 27  Urea Nitrogen: 13  Creatinine: 0.66  GFR Estimate: >90  GFR Estimate If Black: >90  Calcium: 8.7  Anion Gap: 8  Albumin: 3.7  Protein Total: 6.7 (L)  Bilirubin Total: 0.4  Alkaline Phosphatase: 106  ALT: 28  AST: 17  Glucose: 76  WBC: 11.7 (H)  Hemoglobin: 15.8 (H)  Hematocrit: 47.3  (H)  Platelet Count: 245  RBC Count: 5.20  MCV: 91  MCH: 30.4  MCHC: 33.4  RDW: 14.4  INR: 0.99             PAC Discussion and Assessment    ASA Classification: 3  Case is suitable for: Taylor  Anesthetic techniques and relevant risks discussed: GA  Invasive monitoring and risk discussed: Yes  Types:   Possibility and Risk of blood transfusion discussed: Yes  NPO instructions given:   Additional anesthetic preparation and risks discussed:   Needs early admission to pre-op area:   Other:     PAC Resident/NP Anesthesia Assessment:  Suzy Rodríguez is a 31 yo female scheduled for Right Adrenalectomy, Right Lymph Node Dissection, Possible Embolize Liver Standby on 6/25/2018 by Dr. Mansfield and (stand-by) Luana in treatment of adrenal mass     Previous anesthesia without complications    1) Cardiac: No known cardiac diagnosis or symptoms  2) Pulmonary: Never smoked, denies pulmonary symptoms  3) GI: S/P gastric bypass.   4) : nephrolithiasis, s/p lithotripsy 5/2018  5) Endo: Adrenal Mass. Incidental find during work-up for nephrolithiasis. She was seen by endocrinology and levels drawn.  The mass is large measuring 9 cm and extends far posterior to the liver and abuts the vena cava and the diaphragm.  6) Heme: MTFRH, has had no issues with bleeding.     METS >4, feasible airway           I spent 30 minutes with patient, greater than 50% educating on preop meds, counseling on anesthesia and coordinating care for adrenal mass        Reviewed and Signed by PAC Mid-Level Provider/Resident  Mid-Level Provider/Resident: MYRON Crabtree  Date: 6/14/2018  Time: 1030    Attending Anesthesiologist Anesthesia Assessment:  32 year old for adrenalectomy in management of adrenal mass. Patient has no significant cardiac or pulmonary disease. This adrenal mass is very large, and abuts the liver, possible IVC. I suspect that invasive monitoring will be needed, given the location and potential for significant bleeding.      Patient/case discussed with DARLYN. No need to see patient. Patient is appropriate for the planned procedure without further work-up or medical management.      Reviewed and Signed by PAC Anesthesiologist  Anesthesiologist: adenike  Date: 6/12/2018  Time:   Pass/Fail: Pass  Disposition:     PAC Pharmacist Assessment:        Pharmacist:   Date:   Time:      Anesthesia Plan      History & Physical Review  History and physical reviewed and following examination; no interval change.    ASA Status:  2 .        Plan for General, RSI, ETT and Epidural with Intravenous induction. Maintenance will be Balanced.    PONV prophylaxis:  Ondansetron (or other 5HT-3)  Additional equipment: 2nd IV, Arterial Line and Central Line Patient had full breakfast at 8:30 am.  Will wait 8 hours until 4 :30 pm to proceed.      Postoperative Care  Postoperative pain management:  IV analgesics and Neuraxial analgesia.      Consents        Maria Luisa Baez MD  CA-3/PGY-4  6/24/2018  3:23 PM                      .

## 2018-06-14 NOTE — H&P
Pre-Operative H & P     CC:  Preoperative exam to assess for increased cardiopulmonary risk while undergoing surgery and anesthesia.    Date of Encounter: 2018  Primary Care Physician:  Rush Campbell [place]    Reason for visit:  Preop general physical exam  Adrenal mass      HPI  Suzy Rodríguez is a 32 year old female who presents for pre-operative H & P in preparation for Right Adrenalectomy, Right Lymph Node Dissection, Possible Embolize Liver Standby on 2018 by Dr. Mansfield and (stand-by) Luana in treatment of adrenal mass at [facility].     History is obtained from the patient.   drenal Mass. Incidental find during work-up for nephrolithiasis. She was seen by endocrinology and levels drawn.  The mass is large measuring 9 cm and extends far posterior to the liver and abuts the vena cava and the diaphragm.      Past Medical History  Past Medical History:   Diagnosis Date     Adrenal mass (H)        Past Surgical History  Past Surgical History:   Procedure Laterality Date      SECTION      twice     GASTRIC BYPASS         Hx of Blood transfusions/reactions: none    Hx of abnormal bleeding or anti-platelet use: none    Menstrual history: Patient's last menstrual period was 2018.:     Steroid use in the last year: none    Personal or FH with difficulty with Anesthesia:  None        Prior to Admission Medications  Current Outpatient Prescriptions   Medication Sig Dispense Refill     buPROPion (WELLBUTRIN) 100 MG tablet TAKE 1 TABLET (100 MG TOTAL) BY MOUTH 2 (TWO) TIMES A DAY.  3     calcium carbonate antacid 1000 MG CHEW Take 1 tablet by mouth every morning        cholecalciferol (VITAMIN D-1000 MAX ST) 1000 units TABS Take 2,000 Units by mouth every morning        EPINEPHrine (EPIPEN/ADRENACLICK/OR ANY BX GENERIC EQUIV) 0.3 MG/0.3ML injection 2-pack As directed       FLUoxetine (PROZAC) 20 MG capsule Take 20 mg by mouth every  morning        LORazepam (ATIVAN) 0.5 MG tablet Take 1 tablet (0.5 mg) by mouth every 8 hours as needed for anxiety 30 tablet 0     Multiple Vitamins-Calcium (ONE-A-DAY WOMENS PO) Take 2 tablets by mouth every morning        SPIRONOLACTONE PO Take 25 mg by mouth every morning        [DISCONTINUED] buPROPion (WELLBUTRIN) 100 MG tablet Take 100 mg by mouth       Ferrous Sulfate (IRON SUPPLEMENT PO) Take 325 mg by mouth daily (with breakfast)         Allergies  Allergies   Allergen Reactions     Bee Venom Swelling     Ibuprofen Other (See Comments), Hives and Swelling       Social History  Social History     Social History     Marital status:      Spouse name: N/A     Number of children: N/A     Years of education: N/A     Occupational History     Not on file.     Social History Main Topics     Smoking status: Never Smoker     Smokeless tobacco: Never Used     Alcohol use Yes      Comment: occ.     Drug use: No     Sexual activity: No     Other Topics Concern     Not on file     Social History Narrative       Family History  No family history on file.      Anesthesia Evaluation     . Pt has had prior anesthetic. Type: General and MAC           ROS/MED HX    ENT/Pulmonary:  - neg pulmonary ROS     Neurologic:  - neg neurologic ROS     Cardiovascular:  - neg cardiovascular ROS   (+) ----. : . . . :. . No previous cardiac testing       METS/Exercise Tolerance:  >4 METS   Hematologic:         Musculoskeletal:  - neg musculoskeletal ROS       GI/Hepatic:  - neg GI/hepatic ROS       Renal/Genitourinary:     (+) Nephrolithiasis ,       Endo:     (+) Other Endocrine Disorder adenal mass, elevated corisol level.      Psychiatric:  - neg psychiatric ROS       Infectious Disease:  - neg infectious disease ROS       Malignancy:   (+) Malignancy History of Other  Other CA adrenal Active status post         Other:    (+) No chance of pregnancy C-spine cleared: N/A, no H/O Chronic Pain,no other significant disability     "        Physical Exam  Normal systems: cardiovascular, pulmonary and dental    Airway   Mallampati: III  TM distance: >3 FB  Neck ROM: full    Dental     Cardiovascular   Rhythm and rate: regular and normal      Pulmonary    breath sounds clear to auscultation        The complete review of systems is negative other than noted in the HPI or here.   Temp: 98.6  F (37  C) Temp src: Oral BP: (!) 145/103 Pulse: 81     SpO2: 99 %         159 lbs 12.8 oz  5' 4.5\"   Body mass index is 27.01 kg/(m^2).       Physical Exam  Constitutional: Awake, alert, cooperative, no apparent distress, and appears stated age.  Eyes: Pupils equal, round and reactive to light, extra ocular muscles intact, sclera clear, conjunctiva normal.  HENT: Normocephalic, oral pharynx with moist mucus membranes, good dentition. No goiter appreciated.   Respiratory: Clear to auscultation bilaterally, no crackles or wheezing.  Cardiovascular: Regular rate and rhythm, normal S1 and S2, and no murmur noted.  Carotids +2, no bruits. Mild facial edema. Palpable pulses to radial  DP and PT arteries.   GI: Normal bowel sounds, soft, non-distended, non-tender, no masses palpated, no hepatosplenomegaly.  Surgical scars: abdomen   Lymph/Hematologic: No cervical lymphadenopathy and no supraclavicular lymphadenopathy.  Genitourinary:  Deferred   Skin: Warm and dry.  No rashes at anticipated surgical site.   Musculoskeletal: Full ROM of neck. There is no redness, warmth, or swelling of the joints. Gross motor strength is normal.    Neurologic: Awake, alert, oriented to name, place and time. Cranial nerves II-XII are grossly intact. Gait is normal.   Neuropsychiatric: Calm, cooperative. Normal affect.     Labs: (personally reviewed)  Results for VALERY PANDYA (MRN [id number]) as of 6/14/2018 12:14   Ref. Range 5/28/2018 06:30   Creat/24hr Latest Ref Range: 700 - 1600 mg/d 1334   Creat/Vol Latest Units: mg/dL 55   Cortisol Free Duration Urine Latest Units: h 24 "   Cortisol Free Urine Latest Ref Range: <=45.0 ug/d 351.6 (H)   Cortisol Free Urine Intrepretation Unknown SEE NOTE   Cortisol Free Urine Random Latest Units: ug/L 145.00   Cortisol ug/g creatinine Latest Units: ug/g .64   METANEPHRINE RANDOM OR 24 HR URINE Unknown Rpt       Results for SUZY PANDYA (MRN [id number]) as of 2018 12:14   Ref. Range 2018 10:52   Sodium Latest Ref Range: 133 - 144 mmol/L 138   Potassium Latest Ref Range: 3.4 - 5.3 mmol/L 4.0   Chloride Latest Ref Range: 94 - 109 mmol/L 104   Carbon Dioxide Latest Ref Range: 20 - 32 mmol/L 27   Urea Nitrogen Latest Ref Range: 7 - 30 mg/dL 13   Creatinine Latest Ref Range: 0.52 - 1.04 mg/dL 0.66   GFR Estimate Latest Ref Range: >60 mL/min/1.7m2 >90   GFR Estimate If Black Latest Ref Range: >60 mL/min/1.7m2 >90   Calcium Latest Ref Range: 8.5 - 10.1 mg/dL 8.7   Anion Gap Latest Ref Range: 3 - 14 mmol/L 8   Albumin Latest Ref Range: 3.4 - 5.0 g/dL 3.7   Protein Total Latest Ref Range: 6.8 - 8.8 g/dL 6.7 (L)   Bilirubin Total Latest Ref Range: 0.2 - 1.3 mg/dL 0.4   Alkaline Phosphatase Latest Ref Range: 40 - 150 U/L 106   ALT Latest Ref Range: 0 - 50 U/L 28   AST Latest Ref Range: 0 - 45 U/L 17   Glucose Latest Ref Range: 70 - 99 mg/dL 76   WBC Latest Ref Range: 4.0 - 11.0 10e9/L 11.7 (H)   Hemoglobin Latest Ref Range: 11.7 - 15.7 g/dL 15.8 (H)   Hematocrit Latest Ref Range: 35.0 - 47.0 % 47.3 (H)   Platelet Count Latest Ref Range: 150 - 450 10e9/L 245   RBC Count Latest Ref Range: 3.8 - 5.2 10e12/L 5.20   MCV Latest Ref Range: 78 - 100 fl 91   MCH Latest Ref Range: 26.5 - 33.0 pg 30.4   MCHC Latest Ref Range: 31.5 - 36.5 g/dL 33.4   RDW Latest Ref Range: 10.0 - 15.0 % 14.4   INR Latest Ref Range: 0.86 - 1.14  0.99       EKG: Personally reviewed but formal cardiology read pendin2018 SR with short [place]      Outside records reviewed from: care everywhere    ASSESSMENT and PLAN  Suzy Pandya is a 32 year old female scheduled to  undergo Right Adrenalectomy, Right Lymph Node Dissection, Possible Embolize Liver Standby on 6/25/2018 by Dr. Mansfield and (stand-by) Luana in treatment of adrenal mass. She has the following specific operative considerations:   - RCRI : Intermediate serious cardiac risks.  0.9% risk of major adverse cardiac event.   - Anesthesia considerations:  Refer to PAC assessment in anesthesia records  - VTE risk: 1.8%  - SHREYA # of risks 0/8 = low risk (did have SHREYA, but resolved after gastric bypass)  - Post-op delirium risk: low risk  - Risk of PONV score = 2.  If > 2, anti-emetic intervention recommended.      Previous anesthesia without complications  1) Cardiac: No known cardiac diagnosis or symptoms  2) Pulmonary: Never smoked, denies pulmonary symptoms  3) GI: S/P gastric bypass.   4) : nephrolithiasis, s/p lithotripsy 5/2018  5) Endo: Adrenal Mass. Incidental find during work-up for nephrolithiasis. She was seen by endocrinology and levels drawn.  The mass is large measuring 9 cm and extends far posterior to the liver and abuts the vena cava and the diaphragm.  6) Heme: MTFRH, has had no issues with bleeding.  METS >4, feasible airway           I spent 30 minutes with patient, greater than 50% educating on preop meds, counseling on anesthesia and coordinating care for adrenal mass  Pt optimized for surgery. AVS with information on surgery time/arrival time, meds and NPO status given by nursing staff      Patient was discussed with Dr Salazar.    MYRON Mark CNS  Preoperative Assessment Center  [facility]  Phone: [phone]  Fax: [phone]

## 2018-06-14 NOTE — PATIENT INSTRUCTIONS
Preparing for Your Surgery      Name:  Suzy Rodríguez   MRN:  [id number]   :  1985   Today's Date:  2018     Arriving for surgery:  Surgery date:  18  Arrival time:  2:00PM  Please come to:       [facility] [address]  [address] [place]  [address]    -   parking is available in front of the hospital from 5:15 am to 8:00 pm    -  Stop at the Information Desk in the lobby    -   Inform the information person that you are here for surgery. An escort to 3c will be provided. If you would not like an escort, please proceed to 3C on the 3rd floor. [phone]     What can I eat or drink?  -  You may have solid food or milk products until 8 hours prior to your surgery.  8:30AM  -  You may have water, apple juice or 7up/Sprite until 2 1/2 hours prior to your surgery. 2:00PM    Which medicines can I take?  Stop vitamins and supplements one week prior to surgery, including Multivitamins.   -  Do NOT take these medications in the morning, the day of surgery:  Please do not take iron, calcium and Vitamin D the morning of surgery.    -  Please take these medications the day of surgery:  Please take Bupropion (wellbutrin), Fluoxetine(Prozac) and spironolactone the morning of surgery. Please take lorazepam the morning of surgery as needed.      How do I prepare myself?  -  Take two showers: one the night before surgery; and one the morning of surgery.         Use Scrubcare or Hibiclens to wash from neck down.  You may use your own shampoo and conditioner. No other hair products.   -  Do NOT use lotion, powder, deodorant, or antiperspirant the day of your surgery.  -  Do NOT wear any makeup, fingernail polish or jewelry.  -  Begin using Incentive Spirometer 1 week prior to surgery.  Use 4 times per day, up to 5 breaths each time.  Bring Incentive Spirometer to hospital.  - Do not bring your own medications to the hospital, except for inhalers and eye drops.  -   Bring your ID and insurance card.    Questions or Concerns:  -If you have questions or concerns regarding the day of surgery, please call [phone].     -For questions after surgery please call your surgeons office.           AFTER YOUR SURGERY  Breathing exercises   Breathing exercises help you recover faster. Take deep breaths and let the air out slowly. This will:     Help you wake up after surgery.    Help prevent complications like pneumonia.  Preventing complications will help you go home sooner.   We may give you a breathing device (incentive spirometer) to encourage you to breathe deeply.   Nausea and vomiting   You may feel sick to your stomach after surgery; if so, let your nurse know.    Pain control:  After surgery, you may have pain. Our goal is to help you manage your pain. Pain medicine will help you feel comfortable enough to do activities that will help you heal.  These activities may include breathing exercises, walking and physical therapy.   To help your health care team treat your pain we will ask: 1) If you have pain  2) where it is located 3) describe your pain in your words  Methods of pain control include medications given by mouth, vein or by nerve block for some surgeries.  We may give you a pain control pump that will:  1) Deliver the medicine through a tube placed in your vein  2) Control the amount of medicine you receive  3) Allow you to push a button to deliver a dose of pain medicine  Sequential Compression Device (SCD) or Pneumo Boots:  You may need to wear SCD S on your legs or feet. These are wraps connected to a machine that pumps in air and releases it. The repeated pumping helps prevent blood clots from forming.     Using an Incentive Spirometer    An incentive spirometer is a device that helps you do deep breathing exercises. These exercises expand your lungs, aid in circulation, and help prevent pneumonia. Deep breathing exercises also help you breathe better and improve  the function of your lungs by:    Keeping your lungs clear    Strengthening your breathing muscles    Helping prevent respiratory complications or problems  The incentive spirometer gives you a way to take an active part in your care. A nurse or therapist will teach you breathing exercises. To do these exercises, you will breathe in through your mouth and not your nose. The incentive spirometer only works correctly if you breathe in through your mouth.    Steps to clear lungs  Step 1. Exhale normally. Then, inhale normally.    Relax and breathe out.  Step 2. Place your lips tightly around the mouthpiece.    Make sure the device is upright and not tilted.  Step 3. Inhale as much air as you can through the mouthpiece (don't breath through your nose).    Inhale slowly and deeply.    Hold your breath long enough to keep the balls or disk raised for at least 3 to 5 seconds, or as instructed by your healthcare provider.  Step 4. Repeat the exercise regularly.  Begin using the Incentive Spirometer one week prior to your surgery, 4 times per day-5 times each.

## 2018-06-14 NOTE — MR AVS SNAPSHOT
After Visit Summary   2018    Suzy Rodríguez    MRN: [id number]           Patient Information     Date Of Birth          1985        Visit Information        Provider Department      2018 11:00 AM Rn, [facility]        Care Instructions    Preparing for Your Surgery      Name:  Suzy Rodríguez   MRN:  [id number]   :  1985   Today's Date:  2018     Arriving for surgery:  Surgery date:  18  Arrival time:  2:00PM  Please come to:       [facility] [address]  [address] [place]  [address]    -  [employer] parking is available in front of the hospital from 5:15 am to 8:00 pm    -  Stop at the Information Desk in the lobby    -   Inform the information person that you are here for surgery. An escort to 3c will be provided. If you would not like an escort, please proceed to 3C on the 3rd floor. [phone]     What can I eat or drink?  -  You may have solid food or milk products until 8 hours prior to your surgery.  8:30AM  -  You may have water, apple juice or 7up/Sprite until 2 1/2 hours prior to your surgery. 2:00PM    Which medicines can I take?  Stop vitamins and supplements one week prior to surgery, including Multivitamins.   -  Do NOT take these medications in the morning, the day of surgery:  Please do not take calcium and Vitamin D the morning of surgery.    -  Please take these medications the day of surgery:  Please take Bupropion (wellbutrin), Fluoxetine(Prozac), ortho tri-cyclen, spironolactone and tolterodine(detrol) the morning of surgery. Please take lorazepam, zofran and/or oxycodone the morning of surgery as needed.      How do I prepare myself?  -  Take two showers: one the night before surgery; and one the morning of surgery.         Use Scrubcare or Hibiclens to wash from neck down.  You may use your own shampoo and conditioner. No other hair products.   -  Do NOT use  lotion, powder, deodorant, or antiperspirant the day of your surgery.  -  Do NOT wear any makeup, fingernail polish or jewelry.  -  Begin using Incentive Spirometer 1 week prior to surgery.  Use 4 times per day, up to 5 breaths each time.  Bring Incentive Spirometer to hospital.  - Do not bring your own medications to the hospital, except for inhalers and eye drops.  -  Bring your ID and insurance card.    Questions or Concerns:  -If you have questions or concerns regarding the day of surgery, please call [phone].     -For questions after surgery please call your surgeons office.           AFTER YOUR SURGERY  Breathing exercises   Breathing exercises help you recover faster. Take deep breaths and let the air out slowly. This will:     Help you wake up after surgery.    Help prevent complications like pneumonia.  Preventing complications will help you go home sooner.   We may give you a breathing device (incentive spirometer) to encourage you to breathe deeply.   Nausea and vomiting   You may feel sick to your stomach after surgery; if so, let your nurse know.    Pain control:  After surgery, you may have pain. Our goal is to help you manage your pain. Pain medicine will help you feel comfortable enough to do activities that will help you heal.  These activities may include breathing exercises, walking and physical therapy.   To help your health care team treat your pain we will ask: 1) If you have pain  2) where it is located 3) describe your pain in your words  Methods of pain control include medications given by mouth, vein or by nerve block for some surgeries.  We may give you a pain control pump that will:  1) Deliver the medicine through a tube placed in your vein  2) Control the amount of medicine you receive  3) Allow you to push a button to deliver a dose of pain medicine  Sequential Compression Device (SCD) or Pneumo Boots:  You may need to wear SCD S on your legs or feet. These are wraps connected to  a machine that pumps in air and releases it. The repeated pumping helps prevent blood clots from forming.     Using an Incentive Spirometer    An incentive spirometer is a device that helps you do deep breathing exercises. These exercises expand your lungs, aid in circulation, and help prevent pneumonia. Deep breathing exercises also help you breathe better and improve the function of your lungs by:    Keeping your lungs clear    Strengthening your breathing muscles    Helping prevent respiratory complications or problems  The incentive spirometer gives you a way to take an active part in your care. A nurse or therapist will teach you breathing exercises. To do these exercises, you will breathe in through your mouth and not your nose. The incentive spirometer only works correctly if you breathe in through your mouth.    Steps to clear lungs  Step 1. Exhale normally. Then, inhale normally.    Relax and breathe out.  Step 2. Place your lips tightly around the mouthpiece.    Make sure the device is upright and not tilted.  Step 3. Inhale as much air as you can through the mouthpiece (don't breath through your nose).    Inhale slowly and deeply.    Hold your breath long enough to keep the balls or disk raised for at least 3 to 5 seconds, or as instructed by your healthcare provider.  Step 4. Repeat the exercise regularly.  Begin using the Incentive Spirometer one week prior to your surgery, 4 times per day-5 times each.          Follow-ups after your visit        Your next 10 appointments already scheduled     Jun 14, 2018 11:00 AM CDT   (Arrive by 10:45 AM)   PAC RN ASSESSMENT with Bre Pac Rn   [facility] ([facility])    [address]  [address] [facility] [id number] [phone]            Jun 14, 2018 11:30 AM CDT   (Arrive by 11:15 AM)   PAC Anesthesia Consult with Bre Pac Anesthesiologist   [facility] ([facility])    [address]  [address] [facility] [address] [phone]            Jun 25, 2018   Procedure with Warren Mansfield MD   [facility] [place], Same Day Surgery (--)    [address] [address]   [phone]            Jul 12, 2018  7:40 AM CDT   (Arrive by 7:25 AM)   Post-Op with Warren Mansfield MD   [facility] ([facility] [place])    [address]  [address] [facility] [id number] [phone]              Future tests that were ordered for you today     Open Future Orders        Priority Expected Expires Ordered    ABO/Rh type and screen Routine 6/14/2018 7/14/2018 6/14/2018    Comprehensive metabolic panel Routine 6/14/2018 7/14/2018 6/14/2018    CBC with platelets Routine 6/14/2018 7/14/2018 6/14/2018    INR Routine 6/14/2018 7/14/2018 6/14/2018            Who to contact     Please call your clinic at [phone] to:    Ask questions about your health    Make or cancel appointments    Discuss your medicines    Learn about your test results    Speak to your doctor            Additional Information About Your Visit        [employer] Information     [employer] gives you secure access to your electronic health record. If you see a primary care provider, you can also send messages to your care team and make appointments. If you have questions, please call your primary care clinic.  If you do not have a primary care provider, please call [phone] and they will assist you.      [employer] is an electronic gateway that provides easy, online access to your medical records. With [employer], you can request a clinic appointment, read your test results, renew a prescription or communicate with your care team.     To access your existing account, please contact your [facility] or call [phone] for assistance.        Care EveryWhere ID     This is your Care  EveryWhere ID. This could be used by other organizations to access your [place] medical records  [id number]         Blood Pressure from Last 3 Encounters:   06/14/18 (!) 145/103   06/07/18 (!) 151/102   05/25/18 138/82    Weight from Last 3 Encounters:   06/14/18 72.5 kg (159 lb 12.8 oz)   06/07/18 70.3 kg (155 lb)   05/25/18 71.6 kg (157 lb 12.8 oz)              Today, you had the following     No orders found for display       Primary Care Provider Office Phone # Fax #    [facility] [phone]       [facility] [facility] [address]        Equal Access to Services     MARQUITA VALADEZ : Maury villanuevao Miladis, waaxda luqadaha, qaybta kaalmada adeegyada, coleman young. So [facility] [phone].    ATENCIÓN: Si habla español, tiene a lyman disposición servicios gratuitos de asistencia lingüística. [facility] [phone].    We comply with applicable federal civil rights laws and [place] laws. We do not discriminate on the basis of race, color, national origin, age, disability, sex, sexual orientation, or gender identity.            Thank you!     Thank you for choosing [facility]  for your care. Our goal is always to provide you with excellent care. Hearing back from our patients is one way we can continue to improve our services. Please take a few minutes to complete the written survey that you may receive in the mail after your visit with us. Thank you!             Your Updated Medication List - Protect others around you: Learn how to safely use, store and throw away your medicines at www.disposemymeds.org.          This list is accurate as of 6/14/18 10:17 AM.  Always use your most recent med list.                   Brand Name Dispense Instructions for use Diagnosis    * buPROPion 100 MG tablet    WELLBUTRIN     TAKE 1 TABLET (100 MG TOTAL) BY MOUTH 2 (TWO) TIMES A DAY.        * buPROPion 100 MG tablet     WELLBUTRIN     Take 100 mg by mouth        calcium carbonate antacid 1000 MG Chew           EPINEPHrine 0.3 MG/0.3ML injection 2-pack    EPIPEN/ADRENACLICK/or ANY BX GENERIC EQUIV     As directed        FLUoxetine 20 MG capsule    PROzac     Take 20 mg by mouth        * LORazepam 0.5 MG tablet    ATIVAN    30 tablet    Take 1 tablet (0.5 mg) by mouth every 8 hours as needed for anxiety    Adrenal mass (H), Anxiety       * LORazepam 0.5 MG tablet    ATIVAN     Take 0.5 mg by mouth        ONE-A-DAY WOMENS PO           SPIRONOLACTONE PO      Take 25 mg by mouth        VITAMIN D-1000 MAX ST 1000 units Tabs   Generic drug:  cholecalciferol      Take 2,000 Units by mouth        * Notice:  This list has 4 medication(s) that are the same as other medications prescribed for you. Read the directions carefully, and ask your doctor or other care provider to review them with you.

## 2018-06-14 NOTE — DISCHARGE INSTRUCTIONS

## 2018-06-15 ENCOUNTER — OFFICE VISIT - HEALTHEAST (OUTPATIENT)
Dept: FAMILY MEDICINE | Facility: CLINIC | Age: 33
End: 2018-06-15

## 2018-06-15 DIAGNOSIS — E24.9 CUSHING SYNDROME DUE TO ADRENAL DISEASE (H): ICD-10-CM

## 2018-06-15 DIAGNOSIS — I15.2 HYPERTENSION DUE TO ENDOCRINE DISORDER: ICD-10-CM

## 2018-06-15 DIAGNOSIS — E27.8 ADRENAL MASS (H): ICD-10-CM

## 2018-06-15 DIAGNOSIS — F33.9 MAJOR DEPRESSIVE DISORDER, RECURRENT EPISODE (H): ICD-10-CM

## 2018-06-15 DIAGNOSIS — F41.1 GENERALIZED ANXIETY DISORDER: ICD-10-CM

## 2018-06-15 ASSESSMENT — MIFFLIN-ST. JEOR: SCORE: 1394.23

## 2018-06-19 ENCOUNTER — MYC MEDICAL ADVICE (OUTPATIENT)
Dept: UROLOGY | Facility: CLINIC | Age: 33
End: 2018-06-19

## 2018-06-25 ENCOUNTER — HOSPITAL ENCOUNTER (INPATIENT)
Facility: CLINIC | Age: 33
LOS: 3 days | Discharge: HOME | DRG: 614 | End: 2018-06-28
Attending: UROLOGY | Admitting: UROLOGY
Payer: COMMERCIAL

## 2018-06-25 ENCOUNTER — ANESTHESIA (OUTPATIENT)
Dept: SURGERY | Facility: CLINIC | Age: 33
DRG: 614 | End: 2018-06-25
Payer: COMMERCIAL

## 2018-06-25 ENCOUNTER — APPOINTMENT (OUTPATIENT)
Dept: GENERAL RADIOLOGY | Facility: CLINIC | Age: 33
DRG: 614 | End: 2018-06-25
Attending: UROLOGY
Payer: COMMERCIAL

## 2018-06-25 ENCOUNTER — COMMUNICATION - HEALTHEAST (OUTPATIENT)
Dept: UROLOGY | Facility: CLINIC | Age: 33
End: 2018-06-25

## 2018-06-25 DIAGNOSIS — G89.18 ACUTE POST-OPERATIVE PAIN: ICD-10-CM

## 2018-06-25 DIAGNOSIS — Z15.89 H/O MTHFR MUTATION: ICD-10-CM

## 2018-06-25 DIAGNOSIS — E27.8 ADRENAL MASS, RIGHT (H): Primary | ICD-10-CM

## 2018-06-25 LAB
ANION GAP SERPL CALCULATED.3IONS-SCNC: 15 MMOL/L (ref 3–14)
BASE DEFICIT BLDA-SCNC: 1.9 MMOL/L
BASE DEFICIT BLDA-SCNC: 2.7 MMOL/L
BASE EXCESS BLDA CALC-SCNC: 1 MMOL/L
BLD PROD TYP BPU: NORMAL
BLD PROD TYP BPU: NORMAL
BLD UNIT ID BPU: 0
BLD UNIT ID BPU: 0
BLOOD PRODUCT CODE: NORMAL
BLOOD PRODUCT CODE: NORMAL
BPU ID: NORMAL
BPU ID: NORMAL
BUN SERPL-MCNC: 10 MG/DL (ref 7–30)
CA-I BLD-MCNC: 4.4 MG/DL (ref 4.4–5.2)
CA-I BLD-MCNC: 4.6 MG/DL (ref 4.4–5.2)
CA-I BLD-MCNC: 5.7 MG/DL (ref 4.4–5.2)
CALCIUM SERPL-MCNC: 8.2 MG/DL (ref 8.5–10.1)
CHLORIDE SERPL-SCNC: 107 MMOL/L (ref 94–109)
CO2 SERPL-SCNC: 20 MMOL/L (ref 20–32)
CREAT SERPL-MCNC: 0.6 MG/DL (ref 0.52–1.04)
CREAT SERPL-MCNC: 0.63 MG/DL (ref 0.52–1.04)
ERYTHROCYTE [DISTWIDTH] IN BLOOD BY AUTOMATED COUNT: 14.7 % (ref 10–15)
GFR SERPL CREATININE-BSD FRML MDRD: >90 ML/MIN/1.7M2
GFR SERPL CREATININE-BSD FRML MDRD: >90 ML/MIN/1.7M2
GLUCOSE BLD-MCNC: 178 MG/DL (ref 70–99)
GLUCOSE BLD-MCNC: 262 MG/DL (ref 70–99)
GLUCOSE BLD-MCNC: 262 MG/DL (ref 70–99)
GLUCOSE BLDC GLUCOMTR-MCNC: 155 MG/DL (ref 70–99)
GLUCOSE BLDC GLUCOMTR-MCNC: 90 MG/DL (ref 70–99)
GLUCOSE SERPL-MCNC: 158 MG/DL (ref 70–99)
HCG SERPL QL: NEGATIVE
HCO3 BLD-SCNC: 23 MMOL/L (ref 21–28)
HCO3 BLD-SCNC: 24 MMOL/L (ref 21–28)
HCO3 BLD-SCNC: 27 MMOL/L (ref 21–28)
HCT VFR BLD AUTO: 26.7 % (ref 35–47)
HCT VFR BLD AUTO: 27 % (ref 35–47)
HGB BLD-MCNC: 10.9 G/DL (ref 11.7–15.7)
HGB BLD-MCNC: 14.4 G/DL (ref 11.7–15.7)
HGB BLD-MCNC: 16.5 G/DL (ref 11.7–15.7)
HGB BLD-MCNC: 8.8 G/DL (ref 11.7–15.7)
HGB BLD-MCNC: 8.8 G/DL (ref 11.7–15.7)
HGB BLD-MCNC: 9.2 G/DL (ref 11.7–15.7)
LACTATE BLD-SCNC: 3.4 MMOL/L (ref 0.7–2)
LACTATE BLD-SCNC: 5.1 MMOL/L (ref 0.7–2)
LACTATE BLD-SCNC: 5.7 MMOL/L (ref 0.7–2)
MCH RBC QN AUTO: 30.6 PG (ref 26.5–33)
MCHC RBC AUTO-ENTMCNC: 33 G/DL (ref 31.5–36.5)
MCV RBC AUTO: 93 FL (ref 78–100)
O2/TOTAL GAS SETTING VFR VENT: 50 %
PCO2 BLD: 43 MM HG (ref 35–45)
PCO2 BLD: 45 MM HG (ref 35–45)
PCO2 BLD: 45 MM HG (ref 35–45)
PH BLD: 7.34 PH (ref 7.35–7.45)
PH BLD: 7.34 PH (ref 7.35–7.45)
PH BLD: 7.38 PH (ref 7.35–7.45)
PLATELET # BLD AUTO: 220 10E9/L (ref 150–450)
PLATELET # BLD AUTO: 258 10E9/L (ref 150–450)
PO2 BLD: 108 MM HG (ref 80–105)
PO2 BLD: 187 MM HG (ref 80–105)
PO2 BLD: 198 MM HG (ref 80–105)
POTASSIUM BLD-SCNC: 3.3 MMOL/L (ref 3.4–5.3)
POTASSIUM BLD-SCNC: 3.8 MMOL/L (ref 3.4–5.3)
POTASSIUM BLD-SCNC: 3.8 MMOL/L (ref 3.4–5.3)
POTASSIUM SERPL-SCNC: 3 MMOL/L (ref 3.4–5.3)
POTASSIUM SERPL-SCNC: 3.6 MMOL/L (ref 3.4–5.3)
RBC # BLD AUTO: 2.88 10E12/L (ref 3.8–5.2)
SODIUM BLD-SCNC: 135 MMOL/L (ref 133–144)
SODIUM BLD-SCNC: 136 MMOL/L (ref 133–144)
SODIUM BLD-SCNC: 137 MMOL/L (ref 133–144)
SODIUM SERPL-SCNC: 142 MMOL/L (ref 133–144)
TRANSFUSION STATUS PATIENT QL: NORMAL
WBC # BLD AUTO: 23 10E9/L (ref 4–11)

## 2018-06-25 PROCEDURE — 25000132 ZZH RX MED GY IP 250 OP 250 PS 637: Performed by: STUDENT IN AN ORGANIZED HEALTH CARE EDUCATION/TRAINING PROGRAM

## 2018-06-25 PROCEDURE — 25000125 ZZHC RX 250: Performed by: STUDENT IN AN ORGANIZED HEALTH CARE EDUCATION/TRAINING PROGRAM

## 2018-06-25 PROCEDURE — 82330 ASSAY OF CALCIUM: CPT | Performed by: UROLOGY

## 2018-06-25 PROCEDURE — 37000009 ZZH ANESTHESIA TECHNICAL FEE, EACH ADDTL 15 MIN: Performed by: UROLOGY

## 2018-06-25 PROCEDURE — 27210794 ZZH OR GENERAL SUPPLY STERILE: Performed by: UROLOGY

## 2018-06-25 PROCEDURE — 25000128 H RX IP 250 OP 636: Performed by: STUDENT IN AN ORGANIZED HEALTH CARE EDUCATION/TRAINING PROGRAM

## 2018-06-25 PROCEDURE — 0GT30ZZ RESECTION OF RIGHT ADRENAL GLAND, OPEN APPROACH: ICD-10-PCS | Performed by: UROLOGY

## 2018-06-25 PROCEDURE — 12000008 ZZH R&B INTERMEDIATE UMMC

## 2018-06-25 PROCEDURE — 25000128 H RX IP 250 OP 636

## 2018-06-25 PROCEDURE — 37000008 ZZH ANESTHESIA TECHNICAL FEE, 1ST 30 MIN: Performed by: UROLOGY

## 2018-06-25 PROCEDURE — 80048 BASIC METABOLIC PNL TOTAL CA: CPT | Performed by: UROLOGY

## 2018-06-25 PROCEDURE — 25000125 ZZHC RX 250

## 2018-06-25 PROCEDURE — 82947 ASSAY GLUCOSE BLOOD QUANT: CPT | Performed by: UROLOGY

## 2018-06-25 PROCEDURE — 82803 BLOOD GASES ANY COMBINATION: CPT | Performed by: UROLOGY

## 2018-06-25 PROCEDURE — 25000565 ZZH ISOFLURANE, EA 15 MIN: Performed by: UROLOGY

## 2018-06-25 PROCEDURE — 40000275 ZZH STATISTIC RCP TIME EA 10 MIN

## 2018-06-25 PROCEDURE — 25000128 H RX IP 250 OP 636: Performed by: NURSE ANESTHETIST, CERTIFIED REGISTERED

## 2018-06-25 PROCEDURE — 82565 ASSAY OF CREATININE: CPT | Performed by: ANESTHESIOLOGY

## 2018-06-25 PROCEDURE — 71045 X-RAY EXAM CHEST 1 VIEW: CPT

## 2018-06-25 PROCEDURE — 71000014 ZZH RECOVERY PHASE 1 LEVEL 2 FIRST HR: Performed by: UROLOGY

## 2018-06-25 PROCEDURE — 84703 CHORIONIC GONADOTROPIN ASSAY: CPT | Performed by: ANESTHESIOLOGY

## 2018-06-25 PROCEDURE — P9016 RBC LEUKOCYTES REDUCED: HCPCS | Performed by: NURSE PRACTITIONER

## 2018-06-25 PROCEDURE — C9290 INJ, BUPIVACAINE LIPOSOME: HCPCS | Performed by: STUDENT IN AN ORGANIZED HEALTH CARE EDUCATION/TRAINING PROGRAM

## 2018-06-25 PROCEDURE — 25000128 H RX IP 250 OP 636: Performed by: UROLOGY

## 2018-06-25 PROCEDURE — C9399 UNCLASSIFIED DRUGS OR BIOLOG: HCPCS

## 2018-06-25 PROCEDURE — 88341 IMHCHEM/IMCYTCHM EA ADD ANTB: CPT | Performed by: UROLOGY

## 2018-06-25 PROCEDURE — 85027 COMPLETE CBC AUTOMATED: CPT | Performed by: STUDENT IN AN ORGANIZED HEALTH CARE EDUCATION/TRAINING PROGRAM

## 2018-06-25 PROCEDURE — 27210995 ZZH RX 272: Performed by: UROLOGY

## 2018-06-25 PROCEDURE — 88307 TISSUE EXAM BY PATHOLOGIST: CPT | Performed by: UROLOGY

## 2018-06-25 PROCEDURE — 83605 ASSAY OF LACTIC ACID: CPT | Performed by: UROLOGY

## 2018-06-25 PROCEDURE — 71000015 ZZH RECOVERY PHASE 1 LEVEL 2 EA ADDTL HR: Performed by: UROLOGY

## 2018-06-25 PROCEDURE — 84295 ASSAY OF SERUM SODIUM: CPT | Performed by: UROLOGY

## 2018-06-25 PROCEDURE — 85027 COMPLETE CBC AUTOMATED: CPT | Performed by: UROLOGY

## 2018-06-25 PROCEDURE — 25000128 H RX IP 250 OP 636: Performed by: ANESTHESIOLOGY

## 2018-06-25 PROCEDURE — 40000014 ZZH STATISTIC ARTERIAL MONITORING DAILY

## 2018-06-25 PROCEDURE — 25000131 ZZH RX MED GY IP 250 OP 636 PS 637

## 2018-06-25 PROCEDURE — 88342 IMHCHEM/IMCYTCHM 1ST ANTB: CPT | Performed by: UROLOGY

## 2018-06-25 PROCEDURE — 36000062 ZZH SURGERY LEVEL 4 1ST 30 MIN - UMMC: Performed by: UROLOGY

## 2018-06-25 PROCEDURE — 84132 ASSAY OF SERUM POTASSIUM: CPT | Performed by: UROLOGY

## 2018-06-25 PROCEDURE — 83605 ASSAY OF LACTIC ACID: CPT | Performed by: TRANSPLANT SURGERY

## 2018-06-25 PROCEDURE — 84132 ASSAY OF SERUM POTASSIUM: CPT | Performed by: ANESTHESIOLOGY

## 2018-06-25 PROCEDURE — 36415 COLL VENOUS BLD VENIPUNCTURE: CPT | Performed by: STUDENT IN AN ORGANIZED HEALTH CARE EDUCATION/TRAINING PROGRAM

## 2018-06-25 PROCEDURE — 25000125 ZZHC RX 250: Performed by: NURSE ANESTHETIST, CERTIFIED REGISTERED

## 2018-06-25 PROCEDURE — 36415 COLL VENOUS BLD VENIPUNCTURE: CPT | Performed by: ANESTHESIOLOGY

## 2018-06-25 PROCEDURE — 85018 HEMOGLOBIN: CPT | Performed by: STUDENT IN AN ORGANIZED HEALTH CARE EDUCATION/TRAINING PROGRAM

## 2018-06-25 PROCEDURE — P9041 ALBUMIN (HUMAN),5%, 50ML: HCPCS

## 2018-06-25 PROCEDURE — 00000146 ZZHCL STATISTIC GLUCOSE BY METER IP

## 2018-06-25 PROCEDURE — 85018 HEMOGLOBIN: CPT | Performed by: ANESTHESIOLOGY

## 2018-06-25 PROCEDURE — 36000064 ZZH SURGERY LEVEL 4 EA 15 ADDTL MIN - UMMC: Performed by: UROLOGY

## 2018-06-25 PROCEDURE — 40000171 ZZH STATISTIC PRE-PROCEDURE ASSESSMENT III: Performed by: UROLOGY

## 2018-06-25 RX ORDER — SODIUM CHLORIDE, SODIUM LACTATE, POTASSIUM CHLORIDE, CALCIUM CHLORIDE 600; 310; 30; 20 MG/100ML; MG/100ML; MG/100ML; MG/100ML
INJECTION, SOLUTION INTRAVENOUS CONTINUOUS
Status: DISCONTINUED | OUTPATIENT
Start: 2018-06-25 | End: 2018-06-25 | Stop reason: HOSPADM

## 2018-06-25 RX ORDER — FENTANYL CITRATE 50 UG/ML
25-50 INJECTION, SOLUTION INTRAMUSCULAR; INTRAVENOUS
Status: DISCONTINUED | OUTPATIENT
Start: 2018-06-25 | End: 2018-06-25 | Stop reason: HOSPADM

## 2018-06-25 RX ORDER — PROPOFOL 10 MG/ML
INJECTION, EMULSION INTRAVENOUS PRN
Status: DISCONTINUED | OUTPATIENT
Start: 2018-06-25 | End: 2018-06-25

## 2018-06-25 RX ORDER — ONDANSETRON 2 MG/ML
4 INJECTION INTRAMUSCULAR; INTRAVENOUS EVERY 6 HOURS PRN
Status: DISCONTINUED | OUTPATIENT
Start: 2018-06-25 | End: 2018-06-28 | Stop reason: HOSPADM

## 2018-06-25 RX ORDER — ACETAMINOPHEN 325 MG/1
975 TABLET ORAL ONCE
Status: COMPLETED | OUTPATIENT
Start: 2018-06-25 | End: 2018-06-25

## 2018-06-25 RX ORDER — SODIUM CHLORIDE, SODIUM LACTATE, POTASSIUM CHLORIDE, CALCIUM CHLORIDE 600; 310; 30; 20 MG/100ML; MG/100ML; MG/100ML; MG/100ML
INJECTION, SOLUTION INTRAVENOUS CONTINUOUS
Status: DISCONTINUED | OUTPATIENT
Start: 2018-06-25 | End: 2018-06-26

## 2018-06-25 RX ORDER — ONDANSETRON 4 MG/1
4 TABLET, ORALLY DISINTEGRATING ORAL EVERY 6 HOURS PRN
Status: DISCONTINUED | OUTPATIENT
Start: 2018-06-25 | End: 2018-06-28 | Stop reason: HOSPADM

## 2018-06-25 RX ORDER — FENTANYL CITRATE 50 UG/ML
INJECTION, SOLUTION INTRAMUSCULAR; INTRAVENOUS PRN
Status: DISCONTINUED | OUTPATIENT
Start: 2018-06-25 | End: 2018-06-25

## 2018-06-25 RX ORDER — HYDROMORPHONE HYDROCHLORIDE 1 MG/ML
.3-.5 INJECTION, SOLUTION INTRAMUSCULAR; INTRAVENOUS; SUBCUTANEOUS EVERY 10 MIN PRN
Status: DISCONTINUED | OUTPATIENT
Start: 2018-06-25 | End: 2018-06-25 | Stop reason: HOSPADM

## 2018-06-25 RX ORDER — LIDOCAINE HYDROCHLORIDE 20 MG/ML
INJECTION, SOLUTION INFILTRATION; PERINEURAL PRN
Status: DISCONTINUED | OUTPATIENT
Start: 2018-06-25 | End: 2018-06-25

## 2018-06-25 RX ORDER — ACETAMINOPHEN 325 MG/1
650 TABLET ORAL EVERY 4 HOURS PRN
Status: DISCONTINUED | OUTPATIENT
Start: 2018-06-28 | End: 2018-06-28 | Stop reason: HOSPADM

## 2018-06-25 RX ORDER — HEPARIN SODIUM 5000 [USP'U]/.5ML
5000 INJECTION, SOLUTION INTRAVENOUS; SUBCUTANEOUS ONCE
Status: COMPLETED | OUTPATIENT
Start: 2018-06-25 | End: 2018-06-25

## 2018-06-25 RX ORDER — GABAPENTIN 300 MG/1
300 CAPSULE ORAL ONCE
Status: COMPLETED | OUTPATIENT
Start: 2018-06-25 | End: 2018-06-25

## 2018-06-25 RX ORDER — AMOXICILLIN 250 MG
1 CAPSULE ORAL 2 TIMES DAILY
Status: DISCONTINUED | OUTPATIENT
Start: 2018-06-25 | End: 2018-06-28 | Stop reason: HOSPADM

## 2018-06-25 RX ORDER — NALOXONE HYDROCHLORIDE 0.4 MG/ML
.1-.4 INJECTION, SOLUTION INTRAMUSCULAR; INTRAVENOUS; SUBCUTANEOUS
Status: DISCONTINUED | OUTPATIENT
Start: 2018-06-25 | End: 2018-06-25 | Stop reason: HOSPADM

## 2018-06-25 RX ORDER — MEPERIDINE HYDROCHLORIDE 50 MG/ML
12.5 INJECTION INTRAMUSCULAR; INTRAVENOUS; SUBCUTANEOUS
Status: DISCONTINUED | OUTPATIENT
Start: 2018-06-25 | End: 2018-06-25 | Stop reason: HOSPADM

## 2018-06-25 RX ORDER — ALBUMIN HUMAN 5 %
INTRAVENOUS SOLUTION INTRAVENOUS PRN
Status: DISCONTINUED | OUTPATIENT
Start: 2018-06-25 | End: 2018-06-25

## 2018-06-25 RX ORDER — ONDANSETRON 4 MG/1
4 TABLET, ORALLY DISINTEGRATING ORAL EVERY 30 MIN PRN
Status: DISCONTINUED | OUTPATIENT
Start: 2018-06-25 | End: 2018-06-25 | Stop reason: HOSPADM

## 2018-06-25 RX ORDER — ONDANSETRON 2 MG/ML
INJECTION INTRAMUSCULAR; INTRAVENOUS PRN
Status: DISCONTINUED | OUTPATIENT
Start: 2018-06-25 | End: 2018-06-25

## 2018-06-25 RX ORDER — NALOXONE HYDROCHLORIDE 0.4 MG/ML
.1-.4 INJECTION, SOLUTION INTRAMUSCULAR; INTRAVENOUS; SUBCUTANEOUS
Status: DISCONTINUED | OUTPATIENT
Start: 2018-06-25 | End: 2018-06-28 | Stop reason: HOSPADM

## 2018-06-25 RX ORDER — EPHEDRINE SULFATE 50 MG/ML
INJECTION, SOLUTION INTRAMUSCULAR; INTRAVENOUS; SUBCUTANEOUS PRN
Status: DISCONTINUED | OUTPATIENT
Start: 2018-06-25 | End: 2018-06-25

## 2018-06-25 RX ORDER — CALCIUM CHLORIDE 100 MG/ML
INJECTION INTRAVENOUS; INTRAVENTRICULAR PRN
Status: DISCONTINUED | OUTPATIENT
Start: 2018-06-25 | End: 2018-06-25

## 2018-06-25 RX ORDER — BUPROPION HYDROCHLORIDE 100 MG/1
100 TABLET ORAL 2 TIMES DAILY
Status: DISCONTINUED | OUTPATIENT
Start: 2018-06-25 | End: 2018-06-28 | Stop reason: HOSPADM

## 2018-06-25 RX ORDER — ACETAMINOPHEN 325 MG/1
975 TABLET ORAL EVERY 8 HOURS
Status: DISCONTINUED | OUTPATIENT
Start: 2018-06-25 | End: 2018-06-28 | Stop reason: HOSPADM

## 2018-06-25 RX ORDER — LIDOCAINE 40 MG/G
CREAM TOPICAL
Status: DISCONTINUED | OUTPATIENT
Start: 2018-06-25 | End: 2018-06-25 | Stop reason: HOSPADM

## 2018-06-25 RX ORDER — BUPIVACAINE HYDROCHLORIDE 2.5 MG/ML
INJECTION, SOLUTION EPIDURAL; INFILTRATION; INTRACAUDAL PRN
Status: DISCONTINUED | OUTPATIENT
Start: 2018-06-25 | End: 2018-06-25

## 2018-06-25 RX ORDER — ONDANSETRON 2 MG/ML
4 INJECTION INTRAMUSCULAR; INTRAVENOUS EVERY 30 MIN PRN
Status: DISCONTINUED | OUTPATIENT
Start: 2018-06-25 | End: 2018-06-25 | Stop reason: HOSPADM

## 2018-06-25 RX ORDER — HEPARIN SODIUM 5000 [USP'U]/.5ML
5000 INJECTION, SOLUTION INTRAVENOUS; SUBCUTANEOUS EVERY 8 HOURS
Status: DISCONTINUED | OUTPATIENT
Start: 2018-06-26 | End: 2018-06-26

## 2018-06-25 RX ORDER — CEFAZOLIN SODIUM 1 G/3ML
1 INJECTION, POWDER, FOR SOLUTION INTRAMUSCULAR; INTRAVENOUS SEE ADMIN INSTRUCTIONS
Status: DISCONTINUED | OUTPATIENT
Start: 2018-06-25 | End: 2018-06-25 | Stop reason: HOSPADM

## 2018-06-25 RX ORDER — LIDOCAINE 40 MG/G
CREAM TOPICAL
Status: DISCONTINUED | OUTPATIENT
Start: 2018-06-25 | End: 2018-06-28 | Stop reason: HOSPADM

## 2018-06-25 RX ORDER — CEFAZOLIN SODIUM 2 G/100ML
2 INJECTION, SOLUTION INTRAVENOUS
Status: COMPLETED | OUTPATIENT
Start: 2018-06-25 | End: 2018-06-25

## 2018-06-25 RX ORDER — AMOXICILLIN 250 MG
2 CAPSULE ORAL 2 TIMES DAILY
Status: DISCONTINUED | OUTPATIENT
Start: 2018-06-25 | End: 2018-06-28 | Stop reason: HOSPADM

## 2018-06-25 RX ORDER — FLUMAZENIL 0.1 MG/ML
0.2 INJECTION, SOLUTION INTRAVENOUS
Status: DISCONTINUED | OUTPATIENT
Start: 2018-06-25 | End: 2018-06-25 | Stop reason: HOSPADM

## 2018-06-25 RX ORDER — LORAZEPAM 0.5 MG/1
0.5 TABLET ORAL
Status: DISCONTINUED | OUTPATIENT
Start: 2018-06-25 | End: 2018-06-28 | Stop reason: HOSPADM

## 2018-06-25 RX ORDER — DEXAMETHASONE SODIUM PHOSPHATE 4 MG/ML
INJECTION, SOLUTION INTRA-ARTICULAR; INTRALESIONAL; INTRAMUSCULAR; INTRAVENOUS; SOFT TISSUE PRN
Status: DISCONTINUED | OUTPATIENT
Start: 2018-06-25 | End: 2018-06-25

## 2018-06-25 RX ORDER — LIDOCAINE 4 G/G
1 PATCH TOPICAL
Status: DISCONTINUED | OUTPATIENT
Start: 2018-06-26 | End: 2018-06-28 | Stop reason: HOSPADM

## 2018-06-25 RX ADMIN — Medication 5 MG: at 17:46

## 2018-06-25 RX ADMIN — CEFAZOLIN SODIUM 1 G: 2 INJECTION, SOLUTION INTRAVENOUS at 18:59

## 2018-06-25 RX ADMIN — CEFAZOLIN SODIUM 2 G: 2 INJECTION, SOLUTION INTRAVENOUS at 16:58

## 2018-06-25 RX ADMIN — SUGAMMADEX 140 MG: 100 INJECTION, SOLUTION INTRAVENOUS at 19:57

## 2018-06-25 RX ADMIN — INSULIN HUMAN 4 UNITS: 100 INJECTION, SOLUTION PARENTERAL at 19:54

## 2018-06-25 RX ADMIN — ROCURONIUM BROMIDE 30 MG: 10 INJECTION INTRAVENOUS at 18:27

## 2018-06-25 RX ADMIN — BUPIVACAINE 20 ML: 13.3 INJECTION, SUSPENSION, LIPOSOMAL INFILTRATION at 15:15

## 2018-06-25 RX ADMIN — Medication 7.5 MG: at 18:38

## 2018-06-25 RX ADMIN — HYDROCORTISONE SODIUM SUCCINATE 100 MG: 100 INJECTION, POWDER, FOR SOLUTION INTRAMUSCULAR; INTRAVENOUS at 19:50

## 2018-06-25 RX ADMIN — Medication 7.5 MG: at 18:43

## 2018-06-25 RX ADMIN — Medication 5 MG: at 18:47

## 2018-06-25 RX ADMIN — SODIUM CHLORIDE, POTASSIUM CHLORIDE, SODIUM LACTATE AND CALCIUM CHLORIDE: 600; 310; 30; 20 INJECTION, SOLUTION INTRAVENOUS at 21:07

## 2018-06-25 RX ADMIN — PROPOFOL 110 MG: 10 INJECTION, EMULSION INTRAVENOUS at 16:41

## 2018-06-25 RX ADMIN — MIDAZOLAM 2 MG: 1 INJECTION INTRAMUSCULAR; INTRAVENOUS at 16:32

## 2018-06-25 RX ADMIN — BUPIVACAINE HYDROCHLORIDE 20 ML: 2.5 INJECTION, SOLUTION EPIDURAL; INFILTRATION; INTRACAUDAL at 15:15

## 2018-06-25 RX ADMIN — FENTANYL CITRATE 50 MCG: 50 INJECTION, SOLUTION INTRAMUSCULAR; INTRAVENOUS at 17:17

## 2018-06-25 RX ADMIN — LIDOCAINE HYDROCHLORIDE 100 MG: 20 INJECTION, SOLUTION INFILTRATION; PERINEURAL at 16:41

## 2018-06-25 RX ADMIN — PHENYLEPHRINE HYDROCHLORIDE 0.4 MCG/KG/MIN: 10 INJECTION, SOLUTION INTRAMUSCULAR; INTRAVENOUS; SUBCUTANEOUS at 18:50

## 2018-06-25 RX ADMIN — PROPOFOL 30 MG: 10 INJECTION, EMULSION INTRAVENOUS at 19:55

## 2018-06-25 RX ADMIN — ALBUMIN HUMAN 250 ML: 50 SOLUTION INTRAVENOUS at 18:43

## 2018-06-25 RX ADMIN — ALBUMIN HUMAN 250 ML: 50 SOLUTION INTRAVENOUS at 19:03

## 2018-06-25 RX ADMIN — ROCURONIUM BROMIDE 40 MG: 10 INJECTION INTRAVENOUS at 17:24

## 2018-06-25 RX ADMIN — Medication 0.3 MG: at 21:16

## 2018-06-25 RX ADMIN — FENTANYL CITRATE 50 MCG: 50 INJECTION INTRAMUSCULAR; INTRAVENOUS at 21:09

## 2018-06-25 RX ADMIN — HYDROMORPHONE HYDROCHLORIDE: 10 INJECTION, SOLUTION INTRAMUSCULAR; INTRAVENOUS; SUBCUTANEOUS at 21:03

## 2018-06-25 RX ADMIN — CALCIUM CHLORIDE 500 MG: 100 INJECTION, SOLUTION INTRAVENOUS at 19:22

## 2018-06-25 RX ADMIN — GABAPENTIN 300 MG: 300 CAPSULE ORAL at 14:50

## 2018-06-25 RX ADMIN — Medication 5 MG: at 18:49

## 2018-06-25 RX ADMIN — PHENYLEPHRINE HYDROCHLORIDE 100 MCG: 10 INJECTION, SOLUTION INTRAMUSCULAR; INTRAVENOUS; SUBCUTANEOUS at 18:47

## 2018-06-25 RX ADMIN — HYDROMORPHONE HYDROCHLORIDE 0.3 MG: 1 INJECTION, SOLUTION INTRAMUSCULAR; INTRAVENOUS; SUBCUTANEOUS at 18:05

## 2018-06-25 RX ADMIN — INSULIN HUMAN 3 UNITS: 100 INJECTION, SOLUTION PARENTERAL at 19:31

## 2018-06-25 RX ADMIN — Medication 5 MG: at 17:44

## 2018-06-25 RX ADMIN — PROPOFOL 40 MG: 10 INJECTION, EMULSION INTRAVENOUS at 17:16

## 2018-06-25 RX ADMIN — ONDANSETRON 4 MG: 2 INJECTION INTRAMUSCULAR; INTRAVENOUS at 19:55

## 2018-06-25 RX ADMIN — HEPARIN SODIUM 5000 UNITS: 5000 INJECTION, SOLUTION INTRAVENOUS; SUBCUTANEOUS at 19:29

## 2018-06-25 RX ADMIN — FENTANYL CITRATE 50 MCG: 50 INJECTION INTRAMUSCULAR; INTRAVENOUS at 21:02

## 2018-06-25 RX ADMIN — SODIUM CHLORIDE, POTASSIUM CHLORIDE, SODIUM LACTATE AND CALCIUM CHLORIDE: 600; 310; 30; 20 INJECTION, SOLUTION INTRAVENOUS at 16:32

## 2018-06-25 RX ADMIN — HYDROMORPHONE HYDROCHLORIDE 0.5 MG: 1 INJECTION, SOLUTION INTRAMUSCULAR; INTRAVENOUS; SUBCUTANEOUS at 17:23

## 2018-06-25 RX ADMIN — ACETAMINOPHEN 975 MG: 325 TABLET, FILM COATED ORAL at 14:49

## 2018-06-25 RX ADMIN — ROCURONIUM BROMIDE 50 MG: 10 INJECTION INTRAVENOUS at 16:41

## 2018-06-25 RX ADMIN — ONDANSETRON 4 MG: 2 INJECTION INTRAMUSCULAR; INTRAVENOUS at 20:57

## 2018-06-25 RX ADMIN — PHENYLEPHRINE HYDROCHLORIDE 100 MCG: 10 INJECTION, SOLUTION INTRAMUSCULAR; INTRAVENOUS; SUBCUTANEOUS at 19:55

## 2018-06-25 RX ADMIN — FENTANYL CITRATE 50 MCG: 50 INJECTION INTRAMUSCULAR; INTRAVENOUS at 15:10

## 2018-06-25 RX ADMIN — DEXAMETHASONE SODIUM PHOSPHATE 6 MG: 4 INJECTION, SOLUTION INTRA-ARTICULAR; INTRALESIONAL; INTRAMUSCULAR; INTRAVENOUS; SOFT TISSUE at 16:41

## 2018-06-25 RX ADMIN — MIDAZOLAM 1 MG: 1 INJECTION INTRAMUSCULAR; INTRAVENOUS at 15:15

## 2018-06-25 RX ADMIN — CALCIUM CHLORIDE 500 MG: 100 INJECTION, SOLUTION INTRAVENOUS at 19:33

## 2018-06-25 RX ADMIN — PHENYLEPHRINE HYDROCHLORIDE 100 MCG: 10 INJECTION, SOLUTION INTRAMUSCULAR; INTRAVENOUS; SUBCUTANEOUS at 18:49

## 2018-06-25 RX ADMIN — FENTANYL CITRATE 100 MCG: 50 INJECTION, SOLUTION INTRAMUSCULAR; INTRAVENOUS at 16:41

## 2018-06-25 RX ADMIN — MIDAZOLAM 1 MG: 1 INJECTION INTRAMUSCULAR; INTRAVENOUS at 15:10

## 2018-06-25 ASSESSMENT — PAIN DESCRIPTION - DESCRIPTORS: DESCRIPTORS: SORE

## 2018-06-25 NOTE — IP AVS SNAPSHOT
[address] [address]    Phone:  [phone]                                       After Visit Summary   6/25/2018    Suzy Rodríguez    MRN: [id number]           After Visit Summary Signature Page     I have received my discharge instructions, and my questions have been answered. I have discussed any challenges I see with this plan with the nurse or doctor.    ..........................................................................................................................................  Patient/Patient Representative Signature      ..........................................................................................................................................  Patient Representative Print Name and Relationship to Patient    ..................................................               ................................................  Date                                            Time    ..........................................................................................................................................  Reviewed by Signature/Title    ...................................................              ..............................................  Date                                                            Time

## 2018-06-25 NOTE — IP AVS SNAPSHOT
MRN:[id number]                      After Visit Summary   6/25/2018    Suzy Rodríguez    MRN: [id number]           Thank you!     Thank you for choosing [facility] for your care. Our goal is always to provide you with excellent care. Hearing back from our patients is one way we can continue to improve our services. Please take a few minutes to complete the written survey that you may receive in the mail after you visit with us. Thank you!        Patient Information     Date Of Birth          1985        Designated Caregiver       Most Recent Value    Caregiver    Will someone help with your care after discharge? yes    Name of designated caregiver Ashvin    Phone number of caregiver [phone]    Caregiver address lives with patient      About your hospital stay     You were admitted on:  June 25, 2018 You last received care in the:  [address] [facility]    You were discharged on:  June 28, 2018        Reason for your hospital stay       On 6/25/18 Ms. Rodríguez underwent right open adrenalectomy with Dr. Mansfield                  Who to Call     For medical emergencies, please call 911.  For non-urgent questions about your medical care, please call your primary care provider or clinic, [phone]  For questions related to your surgery, please call your surgery clinic        Attending Provider     Provider Specialty    Warren Mansfield MD Urology       Primary Care Provider Office Phone # Fax #    [facility] [phone]      After Care Instructions     Activity       Your activity upon discharge: See discharge instructions            Diet       Follow this diet upon discharge: See discharge instructions            Discharge Instructions       Diet:  - Regular    Activity:   - Until followup, wear your binder when out of bed  - No strenuous exercise for 6 weeks.   - No lifting, pushing, pulling more than 10 pounds for 6 weeks. Take care when pushing  with your arms to stand up.  - Do not strain your belly area.  When you bend, sit up or twice, you could strain the area around your incision.    - Do not strain with bowel movements.    - Do not drive until you can press the brake pedal quickly and fully without pain.    - Do not operate a motor vehicle while taking narcotic pain medications.     Medications:   1) PAIN: Oxycodone is a narcotic medication that has been prescribed for pain.  Narcotics will cause sleepiness and constipation and can become addictive, therefore it is best to stop or reduce them as soon as you can.  Any left over narcotics should be disposed of with an Authorized  for unneeded medications.  Contact your [facility]'s or [facility]'s household trash and recycling service to learn about medication disposal options and guidelines for your area.  If you decide to store this medication at home it should be kept in a locked cabinet to prevent access to children or visitors. To reduce your narcotic use, take both Tylenol (acetaminophen 625mg) and ibuprofen (600mg), alternating between these medications every 3 hours.  These have been prescribed for you.  Do not take more than 4,000mg of Tylenol (acetaminophen/ APAP) from all sources in any 24 hour period since this can cause liver damage.  Do not take more than 2400mg of ibuprofen in any 24 hour period since this can cause kidney damage. Never drive, operate machinery or drink alcoholic beverages while you are taking narcotic pain medications.      2) CONSTIPATION: Pericolace (senna/docusate sodium) can be taken twice daily for prevention of constipation since surgery, pain medications and bladder spasm medications can all make you constipated.  Please reduce or stop pericolace if you develop loose stools. Other over the counter solutions such as prune juice, miralax, fiber products, senna, and dulcolax can also be used. If you are taking the pericolace but still have not had a bowel  movement in 3 days, start over-the-counter Milk of Magnesia taken twice daily until you have a nice bowel movement.  Call the office with any concerns.     3) Cortef (steroids) should be continued until your follow up with Endocrinology in about 3 weeks.    - Take 20mg of Cortef each morning then an additional 10mg at 2pm each afternoon.     4) ANTICOAGULATION  - COntinue Lovenox (enoxaparen) 40mg daily subcutaneous injections for 14 days (or until you follow up with Dr. Ceron)     Incisions:   - Daily showering is important, and you may get incisions wet starting 48 hours after surgery.  - Do not scrub incisions or soak in a bath, pool, hot tub, etc. Until advised by Dr. Ceron  - The wound dressing should be removed 48 hours after surgery.   - The purple skin glue on your incisions will flake off with time and should not be scrubbed.  Also avoid applying lotions or ointments to these areas.  - If steri-strips were used you may wash over them normally, as you would wash your remaining skin.  Steri-strips should fall off on their own within 5-10 days.   - Staples will be removed at your follow up appointment with Dr. Ceron  - It is preferable for the incision to be left uncovered, but you may cover with gauze if needed for comfort or to protect clothing from drainage.                  Follow-up Appointments     Adult [facility] Follow-up and recommended labs and tests       Follow-Up:   - Call your primary care provider to touch base regarding your recent admission.   - Follow up with Dr. Ceron as scheduled in about 2 weeks  - Schedule an appointment to be seen by Endocrinology in about 3 weeks  - Call or return sooner than your regularly scheduled visit if you develop any of the following:  Fever (greater than 101.3F) or flu-like symptoms, uncontrolled pain, uncontrolled nausea or vomiting, as well as increased redness, swelling, or drainage from your wound.    Phone numbers:  - Nursing phone helpline at the  "Urology Clinic (8A-5P M-F):  [phone].    - Nights or weekends, call [phone] and ask the  to page the urology resident on call.   - For emergencies, always call 911      Appointments on [place] and/or [facility] (with [facility] or [facility] provider or service). Call [phone] if you haven't heard regarding these appointments within 7 days of discharge.                  Your next 10 appointments already scheduled     Jul 12, 2018  7:40 AM CDT   (Arrive by 7:25 AM)   Post-Op with Warren Mansfield MD   [facility] ([facility])    [address] [place] [address]  4th [facility] [address] [phone]              Additional Information     If you use hormonal birth control (such as the pill, patch, ring or implants): You'll need a second form of birth control for 7 days (condoms, a diaphragm or contraceptive foam). While in the hospital, you received a medicine called Bridion. Your normal birth control will not work as well for a week after taking this medicine.          Pending Results     Date and Time Order Name Status Description    6/25/2018 1858 Surgical pathology exam In process             Statement of Approval     Ordered          06/28/18 1209  I have reviewed and agree with all the recommendations and orders detailed in this document.  EFFECTIVE NOW     Approved and electronically signed by:  Yolanda Dupont PA             Admission Information     Date & Time Provider Department Dept. Phone    6/25/2018 Warren Mansfield MD [address] [facility] [phone]      Your Vitals Were     Blood Pressure Pulse Temperature Respirations Height Weight    137/78 (BP Location: Left arm) 93 98.6  F (37  C) (Oral) 18 1.63 m (5' 4.17\") 76.2 kg (168 lb 1.6 oz)    Pulse Oximetry BMI (Body Mass Index)                100% 28.7 kg/m2          MyChart Information     [facility] gives you secure access to your " electronic health record. If you see a primary care provider, you can also send messages to your care team and make appointments. If you have questions, please call your primary care clinic.  If you do not have a primary care provider, please call [phone] and they will assist you.        Care EveryWhere ID     This is your Care EveryWhere ID. This could be used by other organizations to access your [facility] medical records  [id number]        Equal Access to Services     MARQUITA VALADEZ : Maury Redding, marcela siddiqi, qaparul kaashlie koehler, coleman young. So [facility] [phone].    ATENCIÓN: Si selinla lilia, tiene a lyman disposición servicios gratuitos de asistencia lingüística. Jemalame al [phone].    We comply with applicable federal civil rights laws and [place] laws. We do not discriminate on the basis of race, color, national origin, age, disability, sex, sexual orientation, or gender identity.               Review of your medicines      START taking        Dose / Directions    acetaminophen 325 MG tablet   Commonly known as:  TYLENOL   Used for:  Adrenal mass, right (H), Acute post-operative pain        Dose:  650 mg   Take 2 tablets (650 mg) by mouth every 4 hours as needed for other (multimodal surgical pain management along with NSAIDS and opioid medication as indicated based on pain control and physical function.)   Quantity:  150 tablet   Refills:  0       enoxaparin 40 MG/0.4ML injection   Commonly known as:  LOVENOX   Used for:  Adrenal mass, right (H), H/O MTHFR mutation        Dose:  40 mg   Inject 0.4 mLs (40 mg) Subcutaneous daily   Quantity:  14 Syringe   Refills:  0       * hydrocortisone 20 MG tablet   Commonly known as:  CORTEF   Used for:  Adrenal mass, right (H)        Dose:  20 mg   Take 1 tablet (20 mg) by mouth every morning (then 10mg at 2pm) until followup with Endocrinology   Quantity:  30 tablet   Refills:  1       * hydrocortisone  10 MG tablet   Commonly known as:  CORTEF   Used for:  Adrenal mass, right (H)        Dose:  10 mg   Take 1 tablet (10 mg) by mouth daily At 2pm until follow up with Endocrinology (total: 30mg daily)   Quantity:  30 tablet   Refills:  1       Lidocaine 4 % Patch   Commonly known as:  LIDOCARE   Used for:  Adrenal mass, right (H), Acute post-operative pain        Dose:  2 patch   Place 2 patches onto the skin every 24 hours   Quantity:  14 patch   Refills:  0       oxyCODONE IR 10 MG tablet   Commonly known as:  ROXICODONE   Used for:  Adrenal mass, right (H), Acute post-operative pain        Dose:  5-10 mg   Take 0.5-1 tablets (5-10 mg) by mouth every 4 hours as needed for moderate to severe pain   Quantity:  22 tablet   Refills:  0       senna-docusate 8.6-50 MG per tablet   Commonly known as:  SENOKOT-S;PERICOLACE   Used for:  Acute post-operative pain        Dose:  1 tablet   Take 1 tablet by mouth 2 times daily To prevent constipation   Quantity:  60 tablet   Refills:  0       * Notice:  This list has 2 medication(s) that are the same as other medications prescribed for you. Read the directions carefully, and ask your doctor or other care provider to review them with you.      CONTINUE these medicines which have NOT CHANGED        Dose / Directions    buPROPion 100 MG tablet   Commonly known as:  WELLBUTRIN        TAKE 1 TABLET (100 MG TOTAL) BY MOUTH 2 (TWO) TIMES A DAY.   Refills:  3       calcium carbonate antacid 1000 MG Chew        Dose:  1 tablet   Take 1 tablet by mouth every morning   Refills:  0       EPINEPHrine 0.3 MG/0.3ML injection 2-pack   Commonly known as:  EPIPEN/ADRENACLICK/or ANY BX GENERIC EQUIV        As directed   Refills:  0       FLUoxetine 20 MG capsule   Commonly known as:  PROzac        Dose:  20 mg   Take 20 mg by mouth every morning   Refills:  0       LORazepam 0.5 MG tablet   Commonly known as:  ATIVAN   Used for:  Adrenal mass (H), Anxiety        Dose:  0.5 mg   Take 1 tablet (0.5  "mg) by mouth every 8 hours as needed for anxiety   Quantity:  30 tablet   Refills:  0       ONE-A-DAY WOMENS PO        Dose:  2 tablet   Take 2 tablets by mouth every morning   Refills:  0       VITAMIN D-1000 MAX ST 1000 units Tabs   Generic drug:  cholecalciferol        Dose:  2000 Units   Take 2,000 Units by mouth every morning   Refills:  0         STOP taking     IRON SUPPLEMENT PO           SPIRONOLACTONE PO                Where to get your medicines      These medications were sent to [facility] [place], [place] - [address] [place] [address]     Phone:  [phone]     enoxaparin 40 MG/0.4ML injection    hydrocortisone 10 MG tablet    hydrocortisone 20 MG tablet    Lidocaine 4 % Patch    senna-docusate 8.6-50 MG per tablet         Some of these will need a paper prescription and others can be bought over the counter. Ask your nurse if you have questions.     Bring a paper prescription for each of these medications     acetaminophen 325 MG tablet    oxyCODONE IR 10 MG tablet                Protect others around you: Learn how to safely use, store and throw away your medicines at www.disposemymeds.org.        Information about your nerve block     Today you received a block to numb the nerves near your surgery site.    This is a block using local anesthetic or \"numbing\" medication injected around the nerves to anesthetize or \"numb\" the area supplied by those nerves. This block is injected into the muscle layer near your surgical site. The type of anesthesia (Exparel) your anesthesia team used to numb your abdomen may give you relief for up to 72 hours.     Diet: There are no diet restrictions, but you should drink plenty of fluids, unless you are on a fluid-restricted diet.     Activity: If your surgical site is an arm or leg you should be careful with your affected limb, since it is possible to injure your limb without being aware of it due to the numbing. " Until full feeling returns, you should guard against bumping or hitting your limb, and avoid extreme hot or cold temperatures on the skin.    Pain Medication: As the block wears off, the feeling will return as a tingling or prickly sensation near your surgical site. You will experience more discomfort from your incisions as the feeling returns. You may want to take a pain pill (a narcotic or Tylenol if this was prescribed by your surgeon) when you start to experience mild pain, before the pain becomes more severe. If your pain medications do not control your pain, you should notify your surgeon. If you are taking narcotics for pain management, do not drink alcohol, drive a car, or perform hazardous activities.  If you have questions or concerns you may call your surgeon at the number provided with your discharge instructions.     Call your surgeon if you experience blurry vision, ringing in the ears or metallic taste in your mouth.         Information about OPIOIDS     PRESCRIPTION OPIOIDS: WHAT YOU NEED TO KNOW   We gave you an opioid (narcotic) pain medicine. It is important to manage your pain, but opioids are not always the best choice. You should first try all the other options your care team gave you. Take this medicine for as short a time (and as few doses) as possible.     These medicines have risks:    DO NOT drive when on new or higher doses of pain medicine. These medicines can affect your alertness and reaction times, and you could be arrested for driving under the influence (DUI). If you need to use opioids long-term, talk to your care team about driving.    DO NOT operate heave machinery    DO NOT do any other dangerous activities while taking these medicines.     DO NOT drink any alcohol while taking these medicines.      If the opioid prescribed includes acetaminophen, DO NOT take with any other medicines that contain acetaminophen. Read all labels carefully. Look for the word  acetaminophen  or   Tylenol.  Ask your pharmacist if you have questions or are unsure.    You can get addicted to pain medicines, especially if you have a history of addiction (chemical, alcohol or substance dependence). Talk to your care team about ways to reduce this risk.    Store your pills in a secure place, locked if possible. We will not replace any lost or stolen medicine. If you don t finish your medicine, please throw away (dispose) as directed by your pharmacist. The [place] Pollution Control Agency has more information about safe disposal: https://www.pca.[facility].[place].us/living-green/managing-unwanted-medications.     All opioids tend to cause constipation. Drink plenty of water and eat foods that have a lot of fiber, such as fruits, vegetables, prune juice, apple juice and high-fiber cereal. Take a laxative (Miralax, milk of magnesia, Colace, Senna) if you don t move your bowels at least every other day.              Medication List: This is a list of all your medications and when to take them. Check marks below indicate your daily home schedule. Keep this list as a reference.      Medications           Morning Afternoon Evening Bedtime As Needed    acetaminophen 325 MG tablet   Commonly known as:  TYLENOL   Take 2 tablets (650 mg) by mouth every 4 hours as needed for other (multimodal surgical pain management along with NSAIDS and opioid medication as indicated based on pain control and physical function.)   Last time this was given:  975 mg on 6/28/2018 11:13 AM                                buPROPion 100 MG tablet   Commonly known as:  WELLBUTRIN   TAKE 1 TABLET (100 MG TOTAL) BY MOUTH 2 (TWO) TIMES A DAY.   Last time this was given:  100 mg on 6/28/2018  9:11 AM                                calcium carbonate antacid 1000 MG Chew   Take 1 tablet by mouth every morning                                enoxaparin 40 MG/0.4ML injection   Commonly known as:  LOVENOX   Inject 0.4 mLs (40 mg) Subcutaneous daily                                 EPINEPHrine 0.3 MG/0.3ML injection 2-pack   Commonly known as:  EPIPEN/ADRENACLICK/or ANY BX GENERIC EQUIV   As directed                                FLUoxetine 20 MG capsule   Commonly known as:  PROzac   Take 20 mg by mouth every morning   Last time this was given:  20 mg on 6/28/2018  9:11 AM                                * hydrocortisone 20 MG tablet   Commonly known as:  CORTEF   Take 1 tablet (20 mg) by mouth every morning (then 10mg at 2pm) until followup with Endocrinology   Last time this was given:  20 mg on 6/28/2018  9:11 AM                                * hydrocortisone 10 MG tablet   Commonly known as:  CORTEF   Take 1 tablet (10 mg) by mouth daily At 2pm until follow up with Endocrinology (total: 30mg daily)   Last time this was given:  20 mg on 6/28/2018  9:11 AM                                Lidocaine 4 % Patch   Commonly known as:  LIDOCARE   Place 2 patches onto the skin every 24 hours   Last time this was given:  1 patch on 6/28/2018  9:21 AM                                LORazepam 0.5 MG tablet   Commonly known as:  ATIVAN   Take 1 tablet (0.5 mg) by mouth every 8 hours as needed for anxiety                                ONE-A-DAY WOMENS PO   Take 2 tablets by mouth every morning                                oxyCODONE IR 10 MG tablet   Commonly known as:  ROXICODONE   Take 0.5-1 tablets (5-10 mg) by mouth every 4 hours as needed for moderate to severe pain   Last time this was given:  5 mg on 6/28/2018  9:20 AM                                senna-docusate 8.6-50 MG per tablet   Commonly known as:  SENOKOT-S;PERICOLACE   Take 1 tablet by mouth 2 times daily To prevent constipation   Last time this was given:  2 tablets on 6/28/2018  9:22 AM                                VITAMIN D-1000 MAX ST 1000 units Tabs   Take 2,000 Units by mouth every morning   Generic drug:  cholecalciferol                                * Notice:  This list has 2 medication(s) that are the  same as other medications prescribed for you. Read the directions carefully, and ask your doctor or other care provider to review them with you.

## 2018-06-25 NOTE — ANESTHESIA PROCEDURE NOTES
Arterial Line Procedure Note  Staff:     Anesthesiologist:  BRENNAN SLATER  Location: In OR After Induction  Procedure Start/Stop Times:     patient identified, IV checked, risks and benefits discussed, informed consent, monitors and equipment checked, pre-op evaluation and at physician/surgeon's request      Correct Patient: Yes      Correct Position: Yes      Correct Site: Yes      Correct Procedure: Yes      Correct Laterality:  N/A    Site Marked:  N/A  Line Placement:     Procedure:  Arterial Line    Insertion Site:  Radial    Insertion laterality:  Left    Skin Prep: Chloraprep      Patient Prep: mask, sterile gloves, hat and hand hygiene      Local skin infiltration:  None    Ultrasound Guided?: No      Catheter size:  20 gauge, Quick cath    Dressing:  Tegaderm    Complications:  None obvious    Arterial waveform: Yes      IBP within 10% of NIBP: Yes

## 2018-06-25 NOTE — OR NURSING
Dr. Griffin at bedside in pre-op. Aware of delay for surgery. MD said he will check with the liver team to see if they can still proceed with surgery at 1600.

## 2018-06-25 NOTE — OR NURSING
(late note) TAP block completed at bedside without incident.  Patient tearful and nervous prior to block and states that she is feeling more relaxed now.  Continue to monitor.   at bedside.

## 2018-06-25 NOTE — ANESTHESIA PROCEDURE NOTES
Peripheral Nerve Block Procedure Note    Staff:     Anesthesiologist:  NILDA GROSSMAN    Resident/CRNA:  HERMAN CARR    Block performed by resident/CRNA in the presence of a teaching physician    Location: Pre-op  Procedure Start/Stop TImes:      6/25/2018 3:10 PM     6/25/2018 3:17 PM    patient identified, IV checked, site marked, risks and benefits discussed, informed consent, monitors and equipment checked, pre-op evaluation, at physician/surgeon's request and post-op pain management      Correct Patient: Yes      Correct Position: Yes      Correct Site: Yes      Correct Procedure: Yes      Correct Laterality:  Yes    Site Marked:  Yes  Procedure details:     Procedure:  TAP    ASA:  3    Laterality:  Bilateral    Position:  Supine    Sterile Prep: chloraprep, mask and sterile gloves      Local skin infiltration:  None    Needle:  Short bevel    Needle gauge:  21    Needle length (mm):  110    Ultrasound: Yes      Ultrasound used to identify targeted nerve, plexus, or vascular structure and placed a needle adjacent to it      Permanent Image entered into patiient's record      Abnormal pain on injection: No      Blood Aspirated: No      Paresthesias:  No    Bleeding at site: No      Bolus via:  Needle    Infusion Method:  Single Shot    Blood aspirated via catheter: No      Complications:  None  Assessment/Narrative:     Injection made incrementally with aspirations every (mL):  5     Bilateral Subcostal TAP Blocks

## 2018-06-25 NOTE — OR NURSING
Late entry for 6/25/18 1010 am. Pt called [facility] stating she had conflicting times on her paperwork and wanted to know when she should arrive for surgery today. I informed her that the time of surgery had changed to 1245 and they wanted her at the hospital by 10:15. She said she didn't know anything about the time change and ate at 8:00 am today. I called [facility] and spoke with the charge nurse. I explained what happened. She said she would call the OR and have them call the pt back. I called the pt back, told her that the pre-op staff would be calling her back and  and asked her not to eat or drink anything else until she heard from them.

## 2018-06-26 ENCOUNTER — APPOINTMENT (OUTPATIENT)
Dept: PHYSICAL THERAPY | Facility: CLINIC | Age: 33
DRG: 614 | End: 2018-06-26
Attending: UROLOGY
Payer: COMMERCIAL

## 2018-06-26 ENCOUNTER — APPOINTMENT (OUTPATIENT)
Dept: GENERAL RADIOLOGY | Facility: CLINIC | Age: 33
DRG: 614 | End: 2018-06-26
Attending: UROLOGY
Payer: COMMERCIAL

## 2018-06-26 LAB
ANION GAP SERPL CALCULATED.3IONS-SCNC: 13 MMOL/L (ref 3–14)
BUN SERPL-MCNC: 14 MG/DL (ref 7–30)
CALCIUM SERPL-MCNC: 8.1 MG/DL (ref 8.5–10.1)
CHLORIDE SERPL-SCNC: 105 MMOL/L (ref 94–109)
CO2 SERPL-SCNC: 24 MMOL/L (ref 20–32)
CREAT SERPL-MCNC: 0.81 MG/DL (ref 0.52–1.04)
ERYTHROCYTE [DISTWIDTH] IN BLOOD BY AUTOMATED COUNT: 15 % (ref 10–15)
ERYTHROCYTE [DISTWIDTH] IN BLOOD BY AUTOMATED COUNT: 15.2 % (ref 10–15)
GFR SERPL CREATININE-BSD FRML MDRD: 81 ML/MIN/1.7M2
GLUCOSE SERPL-MCNC: 138 MG/DL (ref 70–99)
HCT VFR BLD AUTO: 22.8 % (ref 35–47)
HCT VFR BLD AUTO: 24.1 % (ref 35–47)
HGB BLD-MCNC: 7.2 G/DL (ref 11.7–15.7)
HGB BLD-MCNC: 7.4 G/DL (ref 11.7–15.7)
HGB BLD-MCNC: 7.9 G/DL (ref 11.7–15.7)
LACTATE BLD-SCNC: 2.4 MMOL/L (ref 0.7–2)
LACTATE BLD-SCNC: 4.8 MMOL/L (ref 0.7–2)
LACTATE BLD-SCNC: 5 MMOL/L (ref 0.7–2)
MCH RBC QN AUTO: 29.5 PG (ref 26.5–33)
MCH RBC QN AUTO: 30.2 PG (ref 26.5–33)
MCHC RBC AUTO-ENTMCNC: 32.5 G/DL (ref 31.5–36.5)
MCHC RBC AUTO-ENTMCNC: 32.8 G/DL (ref 31.5–36.5)
MCV RBC AUTO: 91 FL (ref 78–100)
MCV RBC AUTO: 92 FL (ref 78–100)
PLATELET # BLD AUTO: 201 10E9/L (ref 150–450)
PLATELET # BLD AUTO: 213 10E9/L (ref 150–450)
POTASSIUM SERPL-SCNC: 4.5 MMOL/L (ref 3.4–5.3)
RBC # BLD AUTO: 2.51 10E12/L (ref 3.8–5.2)
RBC # BLD AUTO: 2.62 10E12/L (ref 3.8–5.2)
SODIUM SERPL-SCNC: 142 MMOL/L (ref 133–144)
WBC # BLD AUTO: 16 10E9/L (ref 4–11)
WBC # BLD AUTO: 18.6 10E9/L (ref 4–11)

## 2018-06-26 PROCEDURE — 97530 THERAPEUTIC ACTIVITIES: CPT | Mod: GP | Performed by: PHYSICAL THERAPIST

## 2018-06-26 PROCEDURE — 71046 X-RAY EXAM CHEST 2 VIEWS: CPT

## 2018-06-26 PROCEDURE — 36415 COLL VENOUS BLD VENIPUNCTURE: CPT | Performed by: STUDENT IN AN ORGANIZED HEALTH CARE EDUCATION/TRAINING PROGRAM

## 2018-06-26 PROCEDURE — 25000132 ZZH RX MED GY IP 250 OP 250 PS 637: Performed by: UROLOGY

## 2018-06-26 PROCEDURE — 25000128 H RX IP 250 OP 636: Performed by: INTERNAL MEDICINE

## 2018-06-26 PROCEDURE — 40000141 ZZH STATISTIC PERIPHERAL IV START W/O US GUIDANCE

## 2018-06-26 PROCEDURE — 85027 COMPLETE CBC AUTOMATED: CPT | Performed by: STUDENT IN AN ORGANIZED HEALTH CARE EDUCATION/TRAINING PROGRAM

## 2018-06-26 PROCEDURE — 99231 SBSQ HOSP IP/OBS SF/LOW 25: CPT | Performed by: NURSE PRACTITIONER

## 2018-06-26 PROCEDURE — 25000125 ZZHC RX 250: Performed by: UROLOGY

## 2018-06-26 PROCEDURE — 12000008 ZZH R&B INTERMEDIATE UMMC

## 2018-06-26 PROCEDURE — 40000193 ZZH STATISTIC PT WARD VISIT: Performed by: PHYSICAL THERAPIST

## 2018-06-26 PROCEDURE — 97116 GAIT TRAINING THERAPY: CPT | Mod: GP | Performed by: PHYSICAL THERAPIST

## 2018-06-26 PROCEDURE — 25000128 H RX IP 250 OP 636: Performed by: STUDENT IN AN ORGANIZED HEALTH CARE EDUCATION/TRAINING PROGRAM

## 2018-06-26 PROCEDURE — 85018 HEMOGLOBIN: CPT | Performed by: STUDENT IN AN ORGANIZED HEALTH CARE EDUCATION/TRAINING PROGRAM

## 2018-06-26 PROCEDURE — 85027 COMPLETE CBC AUTOMATED: CPT | Performed by: UROLOGY

## 2018-06-26 PROCEDURE — 80048 BASIC METABOLIC PNL TOTAL CA: CPT | Performed by: UROLOGY

## 2018-06-26 PROCEDURE — 97161 PT EVAL LOW COMPLEX 20 MIN: CPT | Mod: GP | Performed by: PHYSICAL THERAPIST

## 2018-06-26 PROCEDURE — 83605 ASSAY OF LACTIC ACID: CPT | Performed by: STUDENT IN AN ORGANIZED HEALTH CARE EDUCATION/TRAINING PROGRAM

## 2018-06-26 PROCEDURE — 36415 COLL VENOUS BLD VENIPUNCTURE: CPT | Performed by: UROLOGY

## 2018-06-26 PROCEDURE — 40000894 ZZH STATISTIC OT IP EVAL DEFER: Performed by: OCCUPATIONAL THERAPIST

## 2018-06-26 PROCEDURE — 83605 ASSAY OF LACTIC ACID: CPT | Performed by: UROLOGY

## 2018-06-26 PROCEDURE — 25000128 H RX IP 250 OP 636: Performed by: UROLOGY

## 2018-06-26 RX ORDER — DIPHENHYDRAMINE HYDROCHLORIDE 50 MG/ML
25 INJECTION INTRAMUSCULAR; INTRAVENOUS EVERY 6 HOURS PRN
Status: DISCONTINUED | OUTPATIENT
Start: 2018-06-26 | End: 2018-06-28 | Stop reason: HOSPADM

## 2018-06-26 RX ORDER — SODIUM CHLORIDE 9 MG/ML
INJECTION, SOLUTION INTRAVENOUS CONTINUOUS
Status: DISCONTINUED | OUTPATIENT
Start: 2018-06-26 | End: 2018-06-27

## 2018-06-26 RX ORDER — DIPHENHYDRAMINE HCL 25 MG
25 CAPSULE ORAL EVERY 6 HOURS PRN
Status: DISCONTINUED | OUTPATIENT
Start: 2018-06-26 | End: 2018-06-28 | Stop reason: HOSPADM

## 2018-06-26 RX ADMIN — DIPHENHYDRAMINE HYDROCHLORIDE 25 MG: 25 CAPSULE ORAL at 03:09

## 2018-06-26 RX ADMIN — ACETAMINOPHEN 975 MG: 325 TABLET, FILM COATED ORAL at 10:22

## 2018-06-26 RX ADMIN — SODIUM CHLORIDE: 9 INJECTION, SOLUTION INTRAVENOUS at 13:10

## 2018-06-26 RX ADMIN — SENNOSIDES AND DOCUSATE SODIUM 2 TABLET: 8.6; 5 TABLET ORAL at 19:55

## 2018-06-26 RX ADMIN — SODIUM CHLORIDE: 9 INJECTION, SOLUTION INTRAVENOUS at 05:51

## 2018-06-26 RX ADMIN — HYDROCORTISONE SODIUM SUCCINATE 25 MG: 100 INJECTION, POWDER, FOR SOLUTION INTRAMUSCULAR; INTRAVENOUS at 12:19

## 2018-06-26 RX ADMIN — DIPHENHYDRAMINE HYDROCHLORIDE 25 MG: 25 CAPSULE ORAL at 12:19

## 2018-06-26 RX ADMIN — SODIUM CHLORIDE 500 ML: 9 INJECTION, SOLUTION INTRAVENOUS at 07:51

## 2018-06-26 RX ADMIN — BUPROPION HYDROCHLORIDE 100 MG: 100 TABLET, FILM COATED ORAL at 10:17

## 2018-06-26 RX ADMIN — BUPROPION HYDROCHLORIDE 100 MG: 100 TABLET, FILM COATED ORAL at 19:55

## 2018-06-26 RX ADMIN — ACETAMINOPHEN 975 MG: 325 TABLET, FILM COATED ORAL at 19:54

## 2018-06-26 RX ADMIN — ACETAMINOPHEN 975 MG: 325 TABLET, FILM COATED ORAL at 03:08

## 2018-06-26 RX ADMIN — HYDROCORTISONE SODIUM SUCCINATE 25 MG: 100 INJECTION, POWDER, FOR SOLUTION INTRAMUSCULAR; INTRAVENOUS at 19:55

## 2018-06-26 RX ADMIN — PANTOPRAZOLE SODIUM 40 MG: 40 INJECTION, POWDER, FOR SOLUTION INTRAVENOUS at 10:16

## 2018-06-26 RX ADMIN — DIPHENHYDRAMINE HYDROCHLORIDE 25 MG: 25 CAPSULE ORAL at 19:54

## 2018-06-26 RX ADMIN — HYDROCORTISONE SODIUM SUCCINATE 50 MG: 100 INJECTION, POWDER, FOR SOLUTION INTRAMUSCULAR; INTRAVENOUS at 04:01

## 2018-06-26 RX ADMIN — SODIUM CHLORIDE 500 ML: 9 INJECTION, SOLUTION INTRAVENOUS at 01:00

## 2018-06-26 RX ADMIN — FLUOXETINE 20 MG: 20 CAPSULE ORAL at 10:16

## 2018-06-26 RX ADMIN — SENNOSIDES AND DOCUSATE SODIUM 2 TABLET: 8.6; 5 TABLET ORAL at 10:17

## 2018-06-26 ASSESSMENT — PAIN DESCRIPTION - DESCRIPTORS
DESCRIPTORS: SORE
DESCRIPTORS: SORE

## 2018-06-26 NOTE — PROGRESS NOTES
REGIONAL ANESTHESIA PAIN SERVICE  SUBJECTIVE  Interval history: Patient reports pain well controlled with current analgesics (see below) and nerve block.  Currently reporting pain as comfortably manageable at rest and with activity.  Patient tolerating regular diet,  denies nausea, has pruritis.  Denies any weakness, paresthesias, circumoral numbness, metallic taste or tinnitus.       Clinically Aligned Pain Assessment (CAPA):  Comfort (How is your pain?): Comfortably manageable  Change in Pain (Since your last medication/intervention?): About the same  Pain Control (How are your pain treatments working?):  Fully effective pain control    Medications related to Pain Management (Future)    Start     Dose/Rate Route Frequency Ordered Stop    06/28/18 0000  acetaminophen (TYLENOL) tablet 650 mg      650 mg Oral EVERY 4 HOURS PRN 06/25/18 2212      06/26/18 0800  Lidocaine (LIDOCARE) 4 % Patch 1 patch      1 patch  over 12 Hours Transdermal EVERY 24 HOURS 0800 06/25/18 2212      06/26/18 0800  lidocaine patch in PLACE       Transdermal EVERY 8 HOURS 06/25/18 2212      06/26/18 0246  diphenhydrAMINE (BENADRYL) capsule 25 mg      25 mg Oral EVERY 6 HOURS PRN 06/26/18 0246      06/26/18 0246  diphenhydrAMINE (BENADRYL) injection 25 mg      25 mg  over 1-2 Minutes Intravenous EVERY 6 HOURS PRN 06/26/18 0246      06/25/18 2330  acetaminophen (TYLENOL) tablet 975 mg      975 mg Oral EVERY 8 HOURS 06/25/18 2212 06/29/18 0259    06/25/18 2212  lidocaine 1 % 1 mL      1 mL Other EVERY 1 HOUR PRN 06/25/18 2212      06/25/18 2212  lidocaine (LMX4) cream       Topical EVERY 1 HOUR PRN 06/25/18 2212      06/25/18 2212  senna-docusate (SENOKOT-S;PERICOLACE) 8.6-50 MG per tablet 1 tablet      1 tablet Oral 2 TIMES DAILY 06/25/18 2212      06/25/18 2212  senna-docusate (SENOKOT-S;PERICOLACE) 8.6-50 MG per tablet 2 tablet      2 tablet Oral 2 TIMES DAILY 06/25/18 2212      06/25/18 2100  HYDROmorphone (DILAUDID) PCA 1 mg/mL OPIOID NAIVE  "      Intravenous CONTINUOUS 06/25/18 2052 06/25/18 2035  bupivacaine liposome (EXPAREL) LONG ACTING injection was administered into the infiltration site to produce postsurgical analgesia. Duration of action is up to 72 hours, and other \"bari\" medications should not be given for 96 hours with the exception of the lidocaine 5% patch (LIDODERM) and the lidocaine 10mg in potassium infusions. This entry is for INFORMATION ONLY.       Does not apply CONTINUOUS PRN 06/25/18 2035 06/29/18 2034 06/25/18 2026  LORazepam (ATIVAN) tablet 0.5 mg      0.5 mg Oral AT BEDTIME PRN 06/25/18 2043            OBJECTIVE:    /75 (BP Location: Left arm)  Pulse 71  Temp 98  F (36.7  C) (Oral)  Resp 16  Ht 1.63 m (5' 4.17\")  Wt 74.2 kg (163 lb 8 oz)  SpO2 99%  BMI 27.91 kg/m2  Exam:  General: alert and no distress  Neuro: Strength BLE 5/5     ASSESSMENT/PLAN:    Suzy Rodríguez is a 32 year old female with right adrenal mass, now POD #1 s/p  ADRENALECTOMY, LYMPHADENECTOMY ABDOMINAL, EMBOLECTOMY ABDOMEN\" with single shot injection bilateral subcostal transversus abdominis plane (TAP) nerve block.  Total bupivacaine 0.25% 20 mL and liposomal (long-acting) bupivacaine (Exparel) 1.3% 20 mL administered 6/25/18 for postop pain control.  Pt is ambulating without difficulty.  No weakness or paresthesias.  No evidence of adverse side effects associated with nerve block injections.  Pt acheiving adequate pain control, with nerve block and IV analgesics.  Anticipate 48-72 hours of pain control with long-acting local anesthetic. Additionally, pt will continue to require multimodal analgesia for visceral/muscle/musculoskeletal pain not controlled with long-acting local anesthetic.      - NO other local anesthetic use within 96 hours of liposomal bupivacaine (Exparel), unless approved by RAPS  - patient received verbal and written instructions about liposomal bupivacaine and counseling about pharmacologic and nonpharmacologic " measures for acute postoperative pain management  - please call RAPS if questions or concerns    MYRON Hernandez [facility]  Regional Anesthesia Pain Service (RAPS)  6/26/2018 3:09 PM    RAPS Contact Info (for in-house use only):  Job code ID: [employer] [id number]   [place] [employer] 0599  Peds [phone]  [employer] phone: dial [phone], enter jobcode ID, then enter call-back number.    Text: Use [email or web address] on the Intranet <Paging/Directory> tab and enter Jobcode ID.   If no call back at any time, contact the hospital  and ask for RAPS attending or backup

## 2018-06-26 NOTE — PROGRESS NOTES
Chest x-ray reviewed by Dr. Kilgore, small pneumo noted but no new orders received. Labs reviewed as well.  Dr. Kilgore aware of trending vitals including I&O's. Okay to proceed to 7B.

## 2018-06-26 NOTE — PROVIDER NOTIFICATION
Spoke with Dr Gutierres ([phone]) re: Patient's AIDE drain has come out about an inch, with stitch still around tube.  Patient is bleeding from insertion site.  Gauze dressing applied & primapore placed on top of gauze to keep drain from migrating further out of patient.  Urology team to see patient again later today.

## 2018-06-26 NOTE — PLAN OF CARE
"Problem: Patient Care Overview  Goal: Plan of Care/Patient Progress Review  Outcome: No Change    Patient is on her 4th PIV of the day (2 infiltrated, one was just painful; all 3 PIV's removed).  Patient currently on Dilaudid PCA 0.2mg demand doses available every 10 minutes.  Patient ambulated to nursing station & back x2 this shift.  Patient complained of numbness in both hands & feet; Primary team aware.  Patient's AIDE drain has come out about an inch (stitch formerly in patient now just around tube).  Patient was bleeding from insertion site; gauze added & Primapore dressing applied to help prevent tube from migrating further out of patient.  NS bolus given this morning for low urine output; continue with NS at 150cc/hr.  Patient's diet advanced to regular; patient excited to have \"real food.\"  Benadryl given x1 for itching secondary to IV Dilaudid.       "

## 2018-06-26 NOTE — PROGRESS NOTES
Patient became hypotensive when sat up for chest x-ray, reported feeling light headed. Dr. Griggs notified, to give 500ml lactated ringers bolus.  Will continue to monitor.

## 2018-06-26 NOTE — PROVIDER NOTIFICATION
Paged Dr Gutierres ([phone]) as patient is now complaining of numbness in both toes & fingers bilaterally.

## 2018-06-26 NOTE — PROGRESS NOTES
Pt arrived to unit 2200 from PACU. PCA demand only in L fa piv. LR at 125 cc/hr.  Mid abdominal incision with shadow drainage that has spread outside the last markings. AIDE drained of 50 cc og blood. Ice applied. Ice water given and tolerated. Low BP, OVSS on 1 L O2 via NC. Pads placed under pt as she is menstruating. hgb to be drawn 00-please page Dr Kilgore at #[phone] with results.

## 2018-06-26 NOTE — PLAN OF CARE
Problem: Patient Care Overview  Goal: Plan of Care/Patient Progress Review  OT 7D:  eval orders received, chart reviewed.  Per discussion with interdisciplinary team and pt. no acute IP OT needs at this time.  Pt. aware of post op precautions and is SBA/CGA with BADLs/mobility.  Pt. being followed by PT and will notify OT if needs arise.  OT orders completed.

## 2018-06-26 NOTE — PROGRESS NOTES
[facility], [place]    Sepsis Evaluation Progress Note    Date of Service: 06/26/2018    Sepsis evaluation called due to lactate 4.8.     Physical Exam    Vital Signs:  Temp: 96.3  F (35.7  C) Temp src: Oral BP: 91/55 Pulse: 71 Heart Rate: 88 Resp: 16 SpO2: 97 % O2 Device: Nasal cannula Oxygen Delivery: 1 LPM    Lab:  Lactic Acid   Date Value Ref Range Status   06/25/2018 4.8 (HH) 0.7 - 2.0 mmol/L Final     Comment:     Critical Value called to and read back by  BRITNEY NICOLE ON 6/26/18 AT 0007 BY [id number]           Assessment and Plan    The SIRS and exam findings are likely due to post surgical findings, there is no sign of sepsis at this time.    Disposition: The patient will remain on the current unit. We will continue to monitor this patient closely.    Sai Kilgore MD

## 2018-06-26 NOTE — PROGRESS NOTES
Care Coordinator Progress Note    Admission Date/Time:  6/25/2018  Attending MD:  Warren Mansfield*    Data  Chart reviewed, discussed with interdisciplinary team.   Patient was admitted for:     Concerns with insurance coverage for discharge needs: None.  Current Living Situation: Patient lives with family.  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  Hepatic mobilization performed by transplant surgery). Plan of care reviewed. Patient lives with her , Ashvin. PT deana completed with rec of d/c home with family assist. No home care needs anticipated. RNCC will remain available to assist in d/c planning as needed.      Plan  Anticipated Discharge Date:  Pending post-op goals, including transition to PO pain regimen  Anticipated Discharge Plan:  home    Ena MELO Heme/Onc RN Care Coordinator  Pager [phone]

## 2018-06-26 NOTE — PROGRESS NOTES
06/26/18 1000   Quick Adds   Type of Visit Initial PT Evaluation       Present no   Living Environment   Lives With child(kaz), dependent;spouse   Living Arrangements house   Home Accessibility stairs to enter home;stairs within home   Number of Stairs to Enter Home 3   Number of Stairs Within Home 14   Stair Railings at Home inside, present on right side   Transportation Available family or friend will provide   Functional Level Prior   Ambulation 0-->independent   Transferring 0-->independent   Toileting 0-->independent   Bathing 0-->independent   Dressing 0-->independent   Eating 0-->independent   Communication 0-->understands/communicates without difficulty   Swallowing 0-->swallows foods/liquids without difficulty   Cognition 0 - no cognition issues reported   Fall history within last six months no   General Information   Onset of Illness/Injury or Date of Surgery - Date 06/25/18   Referring Physician Dr. Rufino Rosa   Patient/Family Goals Statement Move with less pain, go home when stable   Pertinent History of Current Problem (include personal factors and/or comorbidities that impact the POC) Pt underwent surgery for adrenocarcinoma. She has midline abdominal incision.   Precautions/Limitations abdominal precautions   General Observations Pt in bed, AIDE drain, abdominal binder   Cognitive Status Examination   Orientation orientation to person, place and time   Level of Consciousness alert   Follows Commands and Answers Questions 100% of the time   Personal Safety and Judgment intact   Memory intact   Pain Assessment   Patient Currently in Pain Yes, see Vital Sign flowsheet   Integumentary/Edema   Integumentary/Edema Comments No significant swelling. Some drainage leaking from drain insertion.   Posture    Posture Comments slightly flexed once standing due to pain   Range of Motion (ROM)   ROM Comment B LE and UE are WNL, trunk not assessed due to precautions   Strength   Strength  "Comments MMT not completed secondary to abdominal incision and avoiding valsalva.  Pt demontrates functional strength   Bed Mobility   Bed Mobility Comments pt rolls at min A, R side to sitting min A, sit to supine at min A   Transfer Skills   Transfer Comments Pt performed sit to/from standing at bed at SBA. She reports increased pain with transition but better when up   Gait   Gait Comments Pt performed gait about 80'  limited by pain. Pt feels pain with deep breath   Balance   Balance Comments functional, moving slow   Sensory Examination   Sensory Perception Comments Pt reports tingling in feet but discriminates and localizes light touch normally.   Coordination   Coordination Comments no concerns, grossly intact without formal assessment needed   Muscle Tone   Muscle Tone no deficits were identified   General Therapy Interventions   Planned Therapy Interventions bed mobility training;gait training;transfer training;home program guidelines;risk factor education   Clinical Impression   Criteria for Skilled Therapeutic Intervention yes, treatment indicated   PT Diagnosis impaired mobility following abdominal surgery   Influenced by the following impairments pain, precautions,    Functional limitations due to impairments bed mobility, transfers, gait tolerance   Clinical Presentation Stable/Uncomplicated   Clinical Presentation Rationale Pt moving well within pain tolerance, able to follow instructions well, spouse supportive   Clinical Decision Making (Complexity) Low complexity   Therapy Frequency` other (see comments)  (4x/week (start 2 days in a row, then every other))   Predicted Duration of Therapy Intervention (days/wks) 5 days   Anticipated Discharge Disposition Home with Assist   Risk & Benefits of therapy have been explained Yes   Patient, Family & other staff in agreement with plan of care Yes   Clinical Impression Comments Pt doing well despite pain, very motivated   [facility] AM-PAC TM \"6 " "Clicks\"   2016, Trustees of [facility], under license to [employer].  All rights reserved.   6 Clicks Short Forms Basic Mobility Inpatient Short Form   [facility] AM-PAC  \"6 Clicks\" V.2 Basic Mobility Inpatient Short Form   1. Turning from your back to your side while in a flat bed without using bedrails? 3 - A Little   2. Moving from lying on your back to sitting on the side of a flat bed without using bedrails? 3 - A Little   3. Moving to and from a bed to a chair (including a wheelchair)? 4 - None   4. Standing up from a chair using your arms (e.g., wheelchair, or bedside chair)? 3 - A Little   5. To walk in hospital room? 4 - None   6. Climbing 3-5 steps with a railing? 3 - A Little   Basic Mobility Raw Score (Score out of 24.Lower scores equate to lower levels of function) 20   Total Evaluation Time   Total Evaluation Time (Minutes) 12     "

## 2018-06-26 NOTE — ANESTHESIA POSTPROCEDURE EVALUATION
Patient: Suzy Rodríguez    Procedure(s):  Right Adrenalectomy,  Embolize Liver , Anesthesia Block - Wound Class: I-Clean   - Wound Class: I-Clean    Diagnosis:Adrenal Mass   Diagnosis Additional Information: No value filed.    Anesthesia Type:  General, RSI, ETT, Epidural    Note:  Anesthesia Post Evaluation    Patient location during evaluation: PACU  Patient participation: Able to fully participate in evaluation  Level of consciousness: awake  Pain management: adequate  Airway patency: patent  Cardiovascular status: acceptable  Respiratory status: acceptable  Hydration status: acceptable  PONV: none     Anesthetic complications: None          Last vitals:  Vitals:    06/25/18 2100 06/25/18 2115 06/25/18 2130   BP: 115/74 116/67 114/74   Resp: 18 17 16   Temp:      SpO2: 100% 99%          Electronically Signed By: Shama Griggs MD  June 25, 2018  9:34 PM

## 2018-06-26 NOTE — OP NOTE
PREOPERATIVE DIAGNOSIS: right adrenal mass, functional hypersecretion of glucocorticoids, concern for adrenocortical carcinoma  POSTOPERATIVE DIAGNOSIS: Same  PROCEDURE PERFORMED:      Right open adrenalectomy (midline incision)    Hepatic mobilization (performed by transplant)    STAFF SURGEON: RHETT Cabezas  RESIDENT SURGEON: KAVITA Rosa  ANESTHESIA: General.   ESTIMATED BLOOD LOSS: 1000 mL.   DRAINS AND TUBES:     Diaz catheter. AIDE drain  COMPLICATIONS: None.   DISPOSITION: PACU.   SPECIMENS OBTAINED: right adrenal mass  SIGNIFICANT FINDINGS:  1) Transplant team assisted with hepatic mobilization. Small fracture of right lobe managed conservatively.   2) Diaphragmatic injury during hepatic mobilization repaired in two layers and negative leak test at conclusion of closure. Decision made not to leave chest tube.  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  After discussion of options she chose to proceed with the above procedures    OPERATION PERFORMED:   Informed consent was obtained and the patient was brought to the operating room where general anesthesia was induced. She was given appropriate preoperative antibiotics. She was then positioned in the supine position where all pressure points were carefully padded and secured. She was then prepped and draped in the usual sterile fashion. We then performed a timeout, verifying the correct patient's site and procedure to be performed.  A Diaz catheter was placed into the bladder    We made a midline incision extending from the xiphoid process to a few centimeters below the umbilicus.  We sequentially went through the different layers of the anterior abdominal wall and entered the peritoneal cavity.  The falciform ligament was controlled  with LigaSure.  We then mobilized the right kidney by incising along the white line of Toldt.  The duodenum was kocherized and inferior vena cava was exposed.  Small accessory hepatic veins to the liver were doubly ligated using 2-0 silk ties.  The right and left renal veins were exposed.  The vena cava was mobilized.  The right adrenal vein was identified by rotating the posterior wall of the vena cava.  This was controlled using 2-0 silk ties.    The transplant team then came and helped us with hepatic mobilization.  Small fracture of the hepatic capsule was managed conservatively using Surgicel, evicel, argon beam.  Care was taken not to injure the inferior vena cava or the main hepatic veins.  Please see transplant note for this part of the procedure next    After mobilization of the liver we were able to control the arterial supply at the medial apex of the adrenal gland.  We was then able to mobilize the adrenal mass off the kidney and remove it from the field.  Margins were visually negative.  An approximately 4cm diaphragmatic injury was noted and this was repaired in 2 layers using Prolene.  Air was evacuated from the site of the injury using a 16 Palestinian red rubber catheter & deep expiratory breath prior to closure.  The abdomen was filled with normal saline and again deep expiratory breath was performed and confirmed lack of air bubbles and adequate closure.  Hemostasis was obtained.  No retroperitoneal lymphadenopathy was noted.  A AIDE drain was left in the retroperitoneal space.  No chest tube was placed.  We closed the fascia with 0-looped PDS in a running fashion and 0-ethibond interrupted internal retention sutures. We then closed the skin using staples. The wounds were dressed with primapore. The patient was then awakened and taken to the PACU in stable condition.     Rufino Rosa

## 2018-06-26 NOTE — PROGRESS NOTES
"Urology  Progress Note    Bolus fluids given for low UOP overnight  Afebrile, labile BPs overnight /55-71  Stood for a minute with some dizziness  Denies flatus  Denies nausea/vomiting, but not much PO intake    Exam  /60  Pulse 71  Temp 98  F (36.7  C)  Resp 16  Ht 1.63 m (5' 4.17\")  Wt 72.9 kg (160 lb 11.5 oz)  SpO2 97%  BMI 27.44 kg/m2  No acute distress  Unlabored breathing  Abdomen soft, nt/nd, midline incision with staples, primapore dressing with moderate drainage, abd binder in place  Lower extremities non-edematous bilaterally  AIDE serosanguinous  Diaz clear in appearance    /300  AIDE 220/90    Labs 6/26  WBC 18.6 (23)  Hgb 7.9 (8.8)  Cr 0.60    Assessment/Plan  32 year old y/o female POD#1 s/p Right open adrenalectomy for large (10cm) right adrenal mass in the context of functional hypersecretion of glucocorticoids and 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  Hepatic mobilization performed by transplant surgery)    Neuro: sched APAP, Dilaudid PCA, Exparel, home fluoxetine, bupropion  CV: stable, no issues  Pulm: incentive spirometry while awake  FEN/GI: Full liquid diet, advance as tolerated, MIVF @ 150cc/hr, will add another bolus this AM  Endo: 50mg TID hydrocortisone for steroid replacement, Endocrinology consultation for management of steroid dosing, appreciate recs  : Diaz catheter in place, plan for removal once ambulating  Heme/ID: Hgb 7.9, transfuse as indicated  Activity: as tolerated  PPx: SCDs    Seen and examined with the chief resident. Will discuss with Dr. Donal Jimenez, PGY-2  Urology Resident     Contacting the Urology Team     Please use the following job codes to reach the Urology Team. Note that you must use an in house phone and that job codes cannot receive text pages.   On weekdays, dial [phone] (or star-star-star [phone] on the new [employer] telephones) then [phone] to reach the Adult Urology resident or PA on call  On weekdays, dial [phone] (or star-star-star [phone] " on the new [employer] telephones) then [phone] to reach the Pediatric Urology resident  On weeknights and weekends, dial [phone] (or star-star-star [phone] on the new [employer] telephones) then [phone] to reach the Urology resident on call (for both Adult and Pediatrics)

## 2018-06-26 NOTE — CONSULTS
Inpatient Endocrinology Consult   Patient: Suzy Rodríguez   MRN: [id number]  Date of Service: 6/26/2018    Chief Complaint:     We were asked to evaluate the patient at the request of Dr. Rosa for evaluation of patient with right adrenal mass secreting glucocorticoids s/p surgical resection - ?assistance with steroid taper and whether spironolactone should be discontinued.     Assessment/Recs:    Right adrenal mass, functional hypersecretion of glucocorticoids/DHEAS - patient is doing well post-operatively. Based on the operative report, the entire mass was removed with clean margins. The size of the tumor is concerning for carcinoma however it still is most likely to be benign especially in the setting of differentiated cortisol secretion. Since we do not know how long she has been in an excess glucocorticoid state, she will need to be on hydrocortisone until she can properly be testing for adrenal insufficiency as an outpatient. Agree with rapid taper if possible but we will need to monitor for symptoms of adrenal insufficiency - fatigue, nausea/vomiting, dizziness, lyte abnormalities, hypotension, etc.   Plan:  -decrease IV hydrocortisone to 25 mcg q8 hrs for today  -we will reassess tomorrow whether we should further taper her dose. On several occasions, cushing's syndrome patient require slower taper since they have prolonged hypercortisolemia prior to the surgery.   -discontinue spironolactone - no longer needed   -f/u pathology report    HTN -- BP well controlled.   Agree with discontinuing Spironolactone.     Patient was seen and discussed with Dr. Up, staff endocrinologist.     ==============    History of Present Illness:  Suzy Rodríguez is a 32 year old woman with a history of polycystic ovary syndrome who was admitted for open surgical resection of a right adrenal mass measuring 9.2x8 cm in size.  It was discovered incidentally on CT scan of abdomen and pelvis on 5/8/2018 for flank pain  due to nephrolithiasis. [facility] and had removal of a 0.6 mm kidney stone.    She saw Dr. Beverly on 5/25/18 for evaluation of the right adrenal mass. She had reported feeling more depressed in the past few months. Her blood pressure was notably elevated though she does not have a diagnosis of hypertension and does not monitor her blood pressure at home.  She recalls her blood pressure being elevated as far back as 3 years ago in the 130-140/80-90 range.     Her history is notable for gastric bypass surgery 2 years ago. She did not have any imaging at that time so we do not know if the adrenal mass was there at that time. Her pre-surgery weight was 235 lbs, lowest weight 135 lbs. She gained about 20 lbs over the course of 8 months, but thought it was related to foot injury and possibly depression. She reported easy bruising, rounding of the face, increased facial hair (required electrolysis and technician noted her hair seemed refractory to treatment, and irregular menses. She has 7 and 10 year old boys. Recalls having a couple of miscarriages in the past.     She had a full screening panel with normal plasma metanpehrines and aldosterone/renin (ratio <20). Her ACTH was <10 and she had elevated urine free cortisol (351.6, N <45), AM cortisol 23, and DHEAS of 740.     She underwent open adrenalectomy on 6/25/2018. Operative report noted margins were visual negative. Post-operatively, she had a lactic acidosis. Labile blood pressures /55-71 overnight. AIDE drain in place. She received 100 mg hydrocortisone pre-operatively and is now on 50 mg q8 hrs.       Currently she feels ok. Some pain. Appetite is ok. She is understandably anxious about the mass and what it might be.   No dizziness, syncopal episodes.   No chest pain or problems with breathing.     Current Problem List:   Patient Active Problem List   Diagnosis     Adrenal mass, right (H)       Past Medical  "and Past Surgical History:  Past Medical History:   Diagnosis Date     Adrenal mass (H)        Past Surgical History:   Procedure Laterality Date      SECTION      twice     GASTRIC BYPASS         Medications:   Current Facility-Administered Medications   Medication     0.9% sodium chloride BOLUS     [START ON 2018] acetaminophen (TYLENOL) tablet 650 mg     acetaminophen (TYLENOL) tablet 975 mg     benzocaine-menthol (CEPACOL) 15-3.6 MG lozenge 1 lozenge     bupivacaine liposome (EXPAREL) LONG ACTING injection was administered into the infiltration site to produce postsurgical analgesia. Duration of action is up to 72 hours, and other \"bari\" medications should not be given for 96 hours with the exception of the lidocaine 5% patch (LIDODERM) and the lidocaine 10mg in potassium infusions. This entry is for INFORMATION ONLY.     buPROPion (WELLBUTRIN) tablet 100 mg     diphenhydrAMINE (BENADRYL) capsule 25 mg    Or     diphenhydrAMINE (BENADRYL) injection 25 mg     FLUoxetine (PROzac) capsule 20 mg     hydrocortisone sodium succinate PF (solu-CORTEF) injection 50 mg     HYDROmorphone (DILAUDID) PCA 1 mg/mL OPIOID NAIVE     lactated ringers infusion     Lidocaine (LIDOCARE) 4 % Patch 1 patch     lidocaine (LMX4) cream     lidocaine 1 % 1 mL     lidocaine patch in PLACE     lidocaine patch REMOVAL     LORazepam (ATIVAN) tablet 0.5 mg     naloxone (NARCAN) injection 0.1-0.4 mg     ondansetron (ZOFRAN-ODT) ODT tab 4 mg    Or     ondansetron (ZOFRAN) injection 4 mg     pantoprazole (PROTONIX) 40 mg IV push injection     senna-docusate (SENOKOT-S;PERICOLACE) 8.6-50 MG per tablet 1 tablet    Or     senna-docusate (SENOKOT-S;PERICOLACE) 8.6-50 MG per tablet 2 tablet     sodium chloride (PF) 0.9% PF flush 3 mL     sodium chloride (PF) 0.9% PF flush 3 mL     sodium chloride 0.9% infusion     No current outpatient prescriptions on file.       Allergies:   Allergies   Allergen Reactions     Bee Venom Swelling     " "Ibuprofen Other (See Comments), Hives and Swelling       Social History:  Social History   Substance Use Topics     Smoking status: Never Smoker     Smokeless tobacco: Never Used     Alcohol use Yes      Comment: occ.       Family History:  History reviewed. No pertinent family history.   No history of adrenal problems in family.     Complete ROS is otherwise unremarkable    Physical Examination:  Blood pressure 103/57, pulse 71, temperature 96  F (35.6  C), temperature source Oral, resp. rate 16, height 1.63 m (5' 4.17\"), weight 72.9 kg (160 lb 11.5 oz), SpO2 100 %, not currently breastfeeding.  GEN: Awake, alert, no distress.  HEENT: EOMI.  NECK: Thyroid non-palpable, non-tender. No cervical or clavicular LAD.   CV: RRR with no murmur.   RESP: CTA bilaterally.   EXT: Trace peripheral edema.   SKIN: Bruising of lower extremities, subtle striae of abdomen (not Cushingoid appearing)  NEURO: Non-focal.    Labs:   Component      Latest Ref Rng & Units 6/26/2018   Sodium      133 - 144 mmol/L 142   Potassium      3.4 - 5.3 mmol/L 4.5   Chloride      94 - 109 mmol/L 105   Carbon Dioxide      20 - 32 mmol/L 24   Anion Gap      3 - 14 mmol/L 13   Glucose      70 - 99 mg/dL 138 (H)   Urea Nitrogen      7 - 30 mg/dL 14   Creatinine      0.52 - 1.04 mg/dL 0.81   GFR Estimate      >60 mL/min/1.7m2 81   GFR Estimate If Black      >60 mL/min/1.7m2 >90   Calcium      8.5 - 10.1 mg/dL 8.1 (L)   WBC      4.0 - 11.0 10e9/L 18.6 (H)   RBC Count      3.8 - 5.2 10e12/L 2.62 (L)   Hemoglobin      11.7 - 15.7 g/dL 7.9 (L)   Hematocrit      35.0 - 47.0 % 24.1 (L)   MCV      78 - 100 fl 92   MCH      26.5 - 33.0 pg 30.2   MCHC      31.5 - 36.5 g/dL 32.8   RDW      10.0 - 15.0 % 15.0   Platelet Count      150 - 450 10e9/L 213   Lactic Acid      0.7 - 2.0 mmol/L 5.0 (HH)     Component      Latest Ref Rng & Units 5/28/2018 5/28/2018 5/31/2018           6:30 AM  6:30 AM    Metanephrine Duration Urine      hr  24    Volume      mL 2420 4146  "   Metanephr 24 H ur/cr      0 - 300 ug/g CRT  42    Metanephrine 24 Hr/Random Urine      39 - 143 ug/d  56    Metanephrine Urine per Volume      ug/L  23    Normetaneph 24H ur/cr      0 - 400 ug/g CRT  116    Normetanephrine 24 Hr/Random Urine      109 - 393 ug/d  155    Normetanephrine Urine per Volume      ug/L  64    Metanephrine 24 Hr/Random Urine Interpretation        SEE NOTE    Creatinine For Methaneph 24 H/Random Urine      mg/dL  55    Creatinine 24 hour      700 - 1600 mg/d  1334    Cortisol Free Duration Urine      h 24     Cortisol ug/g creatinine      ug/g .64     Cortisol Free Urine Random      ug/L 145.00     Cortisol Free Urine      <=45.0 ug/d 351.6 (H)     Creat/Vol      mg/dL 55     Creat/24hr      700 - 1600 mg/d 1334     Cortisol Free Urine Intrepretation       SEE NOTE     Cortisol Serum      4 - 22 ug/dL   23.0 (H)   Aldosterone      ng/dL   13.4   Renin Activity      ng/mL/hr   0.8   Adrenal Corticotropin      <47 pg/mL      DHEA Sulfate      35 - 430 ug/dL        Component      Latest Ref Rng & Units 6/5/2018             Metanephrine Duration Urine      hr    Volume      mL    Metanephr 24 H ur/cr      0 - 300 ug/g CRT    Metanephrine 24 Hr/Random Urine      39 - 143 ug/d    Metanephrine Urine per Volume      ug/L    Normetaneph 24H ur/cr      0 - 400 ug/g CRT    Normetanephrine 24 Hr/Random Urine      109 - 393 ug/d    Normetanephrine Urine per Volume      ug/L    Metanephrine 24 Hr/Random Urine Interpretation          Creatinine For Methaneph 24 H/Random Urine      mg/dL    Creatinine 24 hour      700 - 1600 mg/d    Cortisol Free Duration Urine      h    Cortisol ug/g creatinine      ug/g CRT    Cortisol Free Urine Random      ug/L    Cortisol Free Urine      <=45.0 ug/d    Creat/Vol      mg/dL    Creat/24hr      700 - 1600 mg/d    Cortisol Free Urine Intrepretation          Cortisol Serum      4 - 22 ug/dL    Aldosterone      ng/dL    Renin Activity      ng/mL/hr    Adrenal  Corticotropin      <47 pg/mL <10   DHEA Sulfate      35 - 430 ug/dL 740 (H)     Imaging:     Colin Lang MD   Endocrinology Fellow  Pager: [phone]    Patient was discussed with attending physician, Dr Up.      Attending Note:     Patient was seen and examined with fellow Dr. Lang. The note reflects our mutual findings and plan.     Scott Up MD  [id number]  Endocrinology Service

## 2018-06-26 NOTE — PLAN OF CARE
Problem: Patient Care Overview  Goal: Plan of Care/Patient Progress Review  Pt seen by PT for evaluation. Follows cues well for abodminal precautions with mobility. PT ambulates 80' at CGA using IV pole. Spouse is present and pt and spouse encouraged to do a little walking on their own, however due to lines, ask for assist to get set-up.  Pt in agreement.  She has abdominal pain, some leakage from drain and reports numbness in her toes that is resolved with the PCDs in bed.      Discharge Planner PT   Patient plan for discharge: home  Current status: min A bed mobility, SBA for slow transfers, gait at CGA for 80'  Barriers to return to prior living situation: stairs, pain control, medical stablity  Recommendations for discharge: home with family assist  Rationale for recommendations: Pt independent prior to surgery, does well with first gait, will benefit from time to help with pain management.       Entered by: Luly Conklin 06/26/2018 11:05 AM

## 2018-06-26 NOTE — PLAN OF CARE
Problem: Patient Care Overview  Goal: Plan of Care/Patient Progress Review  Outcome: No Change  Pt with lactate of 4.8 at midnight. Rapid response called. MD notified of rapid response, hgb,lactate, and hypotension. NS bolus of 500 mls ordered for over 2 hours.  SBP improved into the 100's. AIDE with 90 mls out overnight. Pts lactate again elevated at 5.0. HGb of 7.9. Rapid response again called. Urine output per [facility] only 150 mls for 4 hours. MD notified. PIV at 150ml/hr. PCA at 0.2 mg q 10 mins as needed. Abdominal dressing drng not increased outside of markings.Abdominal binder on. Pt stood at bedside with assist of 2.    Problem: Pain, Acute (Adult)  Goal: Identify Related Risk Factors and Signs and Symptoms  Related risk factors and signs and symptoms are identified upon initiation of Human Response Clinical Practice Guideline (CPG).  Outcome: No Change  PCA used per pt overnight. In noticeable pain with turns and standing. Pt encouraged to use PCA with movements. Pt stating good pain control otherwise.

## 2018-06-27 ENCOUNTER — CARE COORDINATION (OUTPATIENT)
Dept: UROLOGY | Facility: CLINIC | Age: 33
End: 2018-06-27

## 2018-06-27 LAB
ABO + RH BLD: NORMAL
ABO + RH BLD: NORMAL
ALBUMIN SERPL-MCNC: 2.4 G/DL (ref 3.4–5)
ALP SERPL-CCNC: 50 U/L (ref 40–150)
ALT SERPL W P-5'-P-CCNC: 366 U/L (ref 0–50)
ANION GAP SERPL CALCULATED.3IONS-SCNC: 7 MMOL/L (ref 3–14)
AST SERPL W P-5'-P-CCNC: 113 U/L (ref 0–45)
BILIRUB DIRECT SERPL-MCNC: <0.1 MG/DL (ref 0–0.2)
BILIRUB SERPL-MCNC: 0.3 MG/DL (ref 0.2–1.3)
BLD GP AB SCN SERPL QL: NORMAL
BLD PROD TYP BPU: NORMAL
BLD PROD TYP BPU: NORMAL
BLD UNIT ID BPU: 0
BLOOD BANK CMNT PATIENT-IMP: NORMAL
BLOOD BANK CMNT PATIENT-IMP: NORMAL
BLOOD PRODUCT CODE: NORMAL
BPU ID: NORMAL
BUN SERPL-MCNC: 10 MG/DL (ref 7–30)
CALCIUM SERPL-MCNC: 7.6 MG/DL (ref 8.5–10.1)
CHLORIDE SERPL-SCNC: 106 MMOL/L (ref 94–109)
CO2 SERPL-SCNC: 28 MMOL/L (ref 20–32)
CREAT SERPL-MCNC: 0.68 MG/DL (ref 0.52–1.04)
ERYTHROCYTE [DISTWIDTH] IN BLOOD BY AUTOMATED COUNT: 15.4 % (ref 10–15)
GFR SERPL CREATININE-BSD FRML MDRD: >90 ML/MIN/1.7M2
GLUCOSE SERPL-MCNC: 88 MG/DL (ref 70–99)
HCT VFR BLD AUTO: 20.6 % (ref 35–47)
HGB BLD-MCNC: 6.8 G/DL (ref 11.7–15.7)
HGB BLD-MCNC: 8.2 G/DL (ref 11.7–15.7)
HGB BLD-MCNC: 8.3 G/DL (ref 11.7–15.7)
MCH RBC QN AUTO: 29.8 PG (ref 26.5–33)
MCHC RBC AUTO-ENTMCNC: 33 G/DL (ref 31.5–36.5)
MCV RBC AUTO: 90 FL (ref 78–100)
NUM BPU REQUESTED: 1
PLATELET # BLD AUTO: 170 10E9/L (ref 150–450)
POTASSIUM SERPL-SCNC: 3.5 MMOL/L (ref 3.4–5.3)
PROT SERPL-MCNC: 4.6 G/DL (ref 6.8–8.8)
RBC # BLD AUTO: 2.28 10E12/L (ref 3.8–5.2)
SODIUM SERPL-SCNC: 141 MMOL/L (ref 133–144)
SPECIMEN EXP DATE BLD: NORMAL
TRANSFUSION STATUS PATIENT QL: NORMAL
TRANSFUSION STATUS PATIENT QL: NORMAL
WBC # BLD AUTO: 11.5 10E9/L (ref 4–11)

## 2018-06-27 PROCEDURE — 25000125 ZZHC RX 250: Performed by: UROLOGY

## 2018-06-27 PROCEDURE — 25000132 ZZH RX MED GY IP 250 OP 250 PS 637: Performed by: UROLOGY

## 2018-06-27 PROCEDURE — 12000008 ZZH R&B INTERMEDIATE UMMC

## 2018-06-27 PROCEDURE — 85027 COMPLETE CBC AUTOMATED: CPT | Performed by: STUDENT IN AN ORGANIZED HEALTH CARE EDUCATION/TRAINING PROGRAM

## 2018-06-27 PROCEDURE — P9016 RBC LEUKOCYTES REDUCED: HCPCS | Performed by: NURSE PRACTITIONER

## 2018-06-27 PROCEDURE — 25000128 H RX IP 250 OP 636: Performed by: STUDENT IN AN ORGANIZED HEALTH CARE EDUCATION/TRAINING PROGRAM

## 2018-06-27 PROCEDURE — 25000128 H RX IP 250 OP 636: Performed by: UROLOGY

## 2018-06-27 PROCEDURE — 36415 COLL VENOUS BLD VENIPUNCTURE: CPT | Performed by: STUDENT IN AN ORGANIZED HEALTH CARE EDUCATION/TRAINING PROGRAM

## 2018-06-27 PROCEDURE — 80076 HEPATIC FUNCTION PANEL: CPT | Performed by: STUDENT IN AN ORGANIZED HEALTH CARE EDUCATION/TRAINING PROGRAM

## 2018-06-27 PROCEDURE — 85018 HEMOGLOBIN: CPT | Performed by: PHYSICIAN ASSISTANT

## 2018-06-27 PROCEDURE — 25000128 H RX IP 250 OP 636: Performed by: INTERNAL MEDICINE

## 2018-06-27 PROCEDURE — 25000132 ZZH RX MED GY IP 250 OP 250 PS 637: Performed by: PHYSICIAN ASSISTANT

## 2018-06-27 PROCEDURE — 80048 BASIC METABOLIC PNL TOTAL CA: CPT | Performed by: STUDENT IN AN ORGANIZED HEALTH CARE EDUCATION/TRAINING PROGRAM

## 2018-06-27 PROCEDURE — 25000132 ZZH RX MED GY IP 250 OP 250 PS 637: Performed by: STUDENT IN AN ORGANIZED HEALTH CARE EDUCATION/TRAINING PROGRAM

## 2018-06-27 PROCEDURE — 85018 HEMOGLOBIN: CPT | Performed by: STUDENT IN AN ORGANIZED HEALTH CARE EDUCATION/TRAINING PROGRAM

## 2018-06-27 RX ORDER — HYDROMORPHONE HYDROCHLORIDE 1 MG/ML
0.2 INJECTION, SOLUTION INTRAMUSCULAR; INTRAVENOUS; SUBCUTANEOUS
Status: DISCONTINUED | OUTPATIENT
Start: 2018-06-27 | End: 2018-06-28 | Stop reason: HOSPADM

## 2018-06-27 RX ORDER — OXYCODONE HYDROCHLORIDE 5 MG/1
5-10 TABLET ORAL EVERY 4 HOURS PRN
Status: DISCONTINUED | OUTPATIENT
Start: 2018-06-27 | End: 2018-06-28 | Stop reason: HOSPADM

## 2018-06-27 RX ORDER — HYDROCORTISONE 5 MG/1
5 TABLET ORAL 2 TIMES DAILY
Status: DISCONTINUED | OUTPATIENT
Start: 2018-07-01 | End: 2018-06-28 | Stop reason: HOSPADM

## 2018-06-27 RX ORDER — HYDROCORTISONE 10 MG/1
10 TABLET ORAL 2 TIMES DAILY
Status: DISCONTINUED | OUTPATIENT
Start: 2018-06-30 | End: 2018-06-28 | Stop reason: HOSPADM

## 2018-06-27 RX ORDER — HYDROCORTISONE 20 MG/1
20 TABLET ORAL
Status: DISCONTINUED | OUTPATIENT
Start: 2018-06-28 | End: 2018-06-28 | Stop reason: HOSPADM

## 2018-06-27 RX ORDER — HEPARIN SODIUM 5000 [USP'U]/.5ML
5000 INJECTION, SOLUTION INTRAVENOUS; SUBCUTANEOUS EVERY 8 HOURS
Status: DISCONTINUED | OUTPATIENT
Start: 2018-06-27 | End: 2018-06-28 | Stop reason: HOSPADM

## 2018-06-27 RX ORDER — HYDROCORTISONE 10 MG/1
10 TABLET ORAL
Status: DISCONTINUED | OUTPATIENT
Start: 2018-06-29 | End: 2018-06-28 | Stop reason: HOSPADM

## 2018-06-27 RX ADMIN — OXYCODONE HYDROCHLORIDE 10 MG: 5 TABLET ORAL at 08:28

## 2018-06-27 RX ADMIN — HEPARIN SODIUM 5000 UNITS: 5000 INJECTION, SOLUTION INTRAVENOUS; SUBCUTANEOUS at 18:44

## 2018-06-27 RX ADMIN — ACETAMINOPHEN 975 MG: 325 TABLET, FILM COATED ORAL at 04:58

## 2018-06-27 RX ADMIN — ACETAMINOPHEN 975 MG: 325 TABLET, FILM COATED ORAL at 11:38

## 2018-06-27 RX ADMIN — PANTOPRAZOLE SODIUM 40 MG: 40 INJECTION, POWDER, FOR SOLUTION INTRAVENOUS at 08:27

## 2018-06-27 RX ADMIN — OXYCODONE HYDROCHLORIDE 10 MG: 5 TABLET ORAL at 17:10

## 2018-06-27 RX ADMIN — HYDROCORTISONE 30 MG: 10 TABLET ORAL at 21:03

## 2018-06-27 RX ADMIN — Medication 0.2 MG: at 18:44

## 2018-06-27 RX ADMIN — BUPROPION HYDROCHLORIDE 100 MG: 100 TABLET, FILM COATED ORAL at 21:03

## 2018-06-27 RX ADMIN — OXYCODONE HYDROCHLORIDE 10 MG: 5 TABLET ORAL at 12:57

## 2018-06-27 RX ADMIN — SENNOSIDES AND DOCUSATE SODIUM 2 TABLET: 8.6; 5 TABLET ORAL at 21:03

## 2018-06-27 RX ADMIN — Medication 0.2 MG: at 16:09

## 2018-06-27 RX ADMIN — BUPROPION HYDROCHLORIDE 100 MG: 100 TABLET, FILM COATED ORAL at 08:28

## 2018-06-27 RX ADMIN — HYDROCORTISONE 30 MG: 10 TABLET ORAL at 12:57

## 2018-06-27 RX ADMIN — SENNOSIDES AND DOCUSATE SODIUM 2 TABLET: 8.6; 5 TABLET ORAL at 08:28

## 2018-06-27 RX ADMIN — OXYCODONE HYDROCHLORIDE 10 MG: 5 TABLET ORAL at 21:02

## 2018-06-27 RX ADMIN — FLUOXETINE 20 MG: 20 CAPSULE ORAL at 08:27

## 2018-06-27 RX ADMIN — SODIUM CHLORIDE: 9 INJECTION, SOLUTION INTRAVENOUS at 05:59

## 2018-06-27 RX ADMIN — ACETAMINOPHEN 975 MG: 325 TABLET, FILM COATED ORAL at 18:43

## 2018-06-27 RX ADMIN — HYDROCORTISONE SODIUM SUCCINATE 25 MG: 100 INJECTION, POWDER, FOR SOLUTION INTRAMUSCULAR; INTRAVENOUS at 04:57

## 2018-06-27 ASSESSMENT — PAIN DESCRIPTION - DESCRIPTORS
DESCRIPTORS: SORE

## 2018-06-27 NOTE — PROGRESS NOTES
"Urology  Progress Note    No acute events overnight  Diaz discontinued and voiding spontaneously without issue  Afebrile, VSS  Ambulated without issues  Tolerating regular diet, denies flatus, denies nausea    Exam  /70 (BP Location: Left arm)  Pulse 80  Temp 98.6  F (37  C) (Oral)  Resp 15  Ht 1.63 m (5' 4.17\")  Wt 74.2 kg (163 lb 8 oz)  SpO2 98%  BMI 27.91 kg/m2  No acute distress  Unlabored breathing  Abdomen soft, nt/nd, midline incision with staples, dressing removed on rounds abd binder in place  Lower extremities non-edematous bilaterally  AIDE serosanguinous    UOP 1325/800  AIDE 415/70    Labs 6/26  WBC 16.0 (18.6)  Hgb 7.4 (7.2)  Cr 0.81 (0.60)    Assessment/Plan  32 year old y/o female POD#2 s/p Right open adrenalectomy for large (10cm) right adrenal mass in the context of functional hypersecretion of glucocorticoids and 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  Hepatic mobilization performed by transplant surgery)    Neuro: sched APAP, will discontinue PCA, PRN PO oxycodone, IV Dilaudid, home fluoxetine, bupropion  CV: stable, no issues  Pulm: incentive spirometry while awake  FEN/GI: Regular diet, discontinue IVF, will add another bolus this AM  Endo: 25mg TID hydrocortisone for steroid replacement, Endocrinology consultation for management of steroid dosing, appreciate recs on fast taper  : voiding spontaneously   Heme/ID: Hgb 6.8 this AM from 7.2, transfuse as indicated  Activity: as tolerated, may shower  PPx: SCDs    Seen and examined with the chief resident. Will discuss with Dr. Donal Jimenez, PGY-2  Urology Resident     Contacting the Urology Team     Please use the following job codes to reach the Urology Team. Note that you must use an in house phone and that job codes cannot receive text pages.   On weekdays, dial [phone] (or star-star-star [phone] on the new [employer] telephones) then [phone] to reach the Adult Urology resident or PA on call  On weekdays, dial [phone] (or star-star-star [phone] " on the new [employer] telephones) then [phone] to reach the Pediatric Urology resident  On weeknights and weekends, dial [phone] (or star-star-star 777 on the new [employer] telephones) then [phone] to reach the Urology resident on call (for both Adult and Pediatrics)

## 2018-06-27 NOTE — PROGRESS NOTES
Transplant Surgery - Inpatient Consult Note  06/27/2018    Assessment & Recommendations: Suzy Rodríguez is a 32-year old female with a history of right adrenal mass and is s/p right open adrenalectomy for large (10cm) right adrenal mass concerning for adrenal cortical carcinoma. Hepatic mobilization was performed by transplant surgery. Patient is progressing well, and has tolerated diet advancement. No BM since surgery. Urology team transfusing one unit of PRBCs for a hemoglobin of 6.8 (from 7.4). Hemodynamically stable with no signs of active bleeding. AIDE drain in place with 415cc out in the last 24 hours. AST and ALT are mildly elevated, Tbili and alk phos are normal. Mild elevation in liver enzymes not unexpected after manipulation of the liver in the OR. Will check hepatic panel tomorrow to trend.    - Agree with blood transfusion.  - Recommend keeping AIDE drain for today.  - Hepatic panel daily (ordered).  - Will continue to follow, please call us with questions.    Medical Decision Making: Medium  Consult [phone] straight forward, 20 minutes    DARLYN/Fellow/Resident Provider: Ellen Jacinto NP x [phone]    Faculty: Nora Cabezas M.D.    __________________________________________________________________    Interval History:  Patient reports that she is feeling well today, and abdominal pain has improved. Denies nausea, tolerating a regular diet. Hopeful to discharge home tomorrow.    ROS:   A 10-point review of systems was negative except as noted above.    Curent Meds:    acetaminophen  975 mg Oral Q8H     buPROPion  100 mg Oral BID     FLUoxetine  20 mg Oral QAM     hydrocortisone  30 mg Oral TID w/meals    Followed by     [START ON 6/28/2018] hydrocortisone  20 mg Oral TID w/meals    Followed by     [START ON 6/29/2018] hydrocortisone  10 mg Oral TID w/meals    Followed by     [START ON 6/30/2018] hydrocortisone  10 mg Oral BID    Followed by     [START ON 7/1/2018] hydrocortisone  5 mg Oral BID     lidocaine   1 patch Transdermal Q24H     lidocaine   Transdermal Q8H     lidocaine   Transdermal Q24h     pantoprazole (PROTONIX) IV  40 mg Intravenous Daily with breakfast     senna-docusate  1 tablet Oral BID    Or     senna-docusate  2 tablet Oral BID     sodium chloride (PF)  3 mL Intracatheter Q8H       Physical Exam:     Admit Weight: 72.9 kg (160 lb 11.5 oz)    Vital sign ranges:    Temp:  [96.7  F (35.9  C)-99.1  F (37.3  C)] 98.3  F (36.8  C)  Pulse:  [80-93] 93  Heart Rate:  [86-94] 94  Resp:  [15-18] 18  BP: (119-129)/(64-77) 119/65  SpO2:  [96 %-100 %] 98 %    General Appearance: in no apparent distress.   Skin: normal, warm, and dry  Lungs: nonlabored breathing on room air  Abdomen: The abdomen is nondistended and nontender, surgical incision C/D/I with staples in place. Right AIDE drain in place with serosanguinous drainage.  Extremities: no peripheral edema    Data:   CMP  Recent Labs  Lab 06/27/18  0517 06/26/18  0417  06/25/18  1930 06/25/18  1900    142  < > 135 136   POTASSIUM 3.5 4.5  < > 3.3* 3.8   CHLORIDE 106 105  < >  --   --    CO2 28 24  < >  --   --    GLC 88 138*  < > 262* 262*   BUN 10 14  < >  --   --    CR 0.68 0.81  < >  --   --    GFRESTIMATED >90 81  < >  --   --    GFRESTBLACK >90 >90  < >  --   --    SHASHA 7.6* 8.1*  < >  --   --    ICAW  --   --   --  5.7* 4.4   ALBUMIN 2.4*  --   --   --   --    BILITOTAL 0.3  --   --   --   --    ALKPHOS 50  --   --   --   --    *  --   --   --   --    *  --   --   --   --    < > = values in this interval not displayed.  CBC  Recent Labs  Lab 06/27/18  0517 06/26/18  2222   HGB 6.8* 7.4*   WBC 11.5* 16.0*    201

## 2018-06-27 NOTE — PLAN OF CARE
Problem: Patient Care Overview  Goal: Plan of Care/Patient Progress Review  Outcome: Improving  VSS on RA, afebrile. AIDE drain dressing changed and reinforced with gauze and primipore. Strict Is/Os, adequate output. Diaz removed, pt was able to urinate but no hat was in the toilet. Bladder scanned and showed 69 ccs. NS infusing via R PIV at 100 ml/hr. Pt ambulated 3x in halls, independent and SBA. Benadryl given x1 for itching, paged MD for atarax. Notified MD of Hgb drop from 7.9 to 7.2 and lactic acid of 2.4. Pt being followed by urology (#[id number]). Abdominal binder in place. Abd dressing drainage previously marked; not actively increasing in size.  at bedside.

## 2018-06-27 NOTE — PLAN OF CARE
Problem: Patient Care Overview  Goal: Plan of Care/Patient Progress Review  Outcome: No Change    Patient now off IV narcotics & has taken 10mg Oxycodone x2 this shift with relief.  Patient currently receiving 1 unit RBCs for Hgb of 6.8 today.  Patient ambulating halls with .  Patient endorses a good appetite.  Incision & AIDE drain insertion site c/d/i today.  Patient hoping to discharge tomorrow.

## 2018-06-27 NOTE — PLAN OF CARE
Problem: Patient Care Overview  Goal: Plan of Care/Patient Progress Review  Alert and oriented X 4. AVSS. Denies SOB or nausea. C/o abdominal pain, exacerbated by movement and chest pain with deep breathing. Using hydromorphone PCA and scheduled acetaminophen.  She states pain is well controlled at rest. Cough and deep breathing and incentive spirometer encouraged. AIDE drain dressing CDI. Has had 70 mL dark brown output from drain. Has had 800 mL urine output so far this shift. First void was pink with some clots. Pt reports she's on her menses. Sleeping in between cares.

## 2018-06-27 NOTE — PROGRESS NOTES
"Endocrine Consult Follow Up   Date of Service: 6/27/2018    Right adrenal mass, functional hypersecretion of glucocorticoids/DHEAS - patient is doing well post-operatively. Based on the operative report, the entire mass was removed with clean margins. The size of the tumor is concerning for carcinoma however it still is most likely to be benign especially in the setting of differentiated cortisol secretion. Since we do not know how long she has been in an excess glucocorticoid state, she will need to be on hydrocortisone until she can properly be testing for adrenal insufficiency as an outpatient. Agree with rapid taper if possible but we will need to monitor for symptoms of adrenal insufficiency - fatigue, nausea/vomiting, dizziness, lyte abnormalities, hypotension, etc.   Plan:  -agree with cortef taper  -she will need maintenance cortef 20 mg in the AM, 10 mg at 2 pm daily until follow-up with endo  -20/10 cortef can be initiated at discharge  -agree with stopping spironolactone - no longer needed   -f/u pathology report       Subjective (24 hr history):  Feels better today.   Pain improved.  Eating fine.  Slept well.   May be going home tomorrow.  Denies dizziness or lightheadedness when getting up to use the restroom.  Walking the halls.    Physical Examination:  Blood pressure 119/65, pulse 93, temperature 98.3  F (36.8  C), temperature source Oral, resp. rate 18, height 1.63 m (5' 4.17\"), weight 76.2 kg (168 lb 1.6 oz), SpO2 98 %, not currently breastfeeding.  GEN: Awake, alert, no distress.  CV: RRR with no murmur.   RESP: CTA bilaterally.   ABD: Soft, non-tender, AIDE drain in place  EXT: No peripheral edema.    NEURO: Non-focal     Labs:   Component      Latest Ref Rng & Units 6/27/2018   Sodium      133 - 144 mmol/L 141   Potassium      3.4 - 5.3 mmol/L 3.5   Chloride      94 - 109 mmol/L 106   Carbon Dioxide      20 - 32 mmol/L 28   Anion Gap      3 - 14 mmol/L 7   Glucose      70 - 99 mg/dL 88   Urea " Nitrogen      7 - 30 mg/dL 10   Creatinine      0.52 - 1.04 mg/dL 0.68   GFR Estimate      >60 mL/min/1.7m2 >90   GFR Estimate If Black      >60 mL/min/1.7m2 >90   Calcium      8.5 - 10.1 mg/dL 7.6 (L)   Bilirubin Direct      0.0 - 0.2 mg/dL <0.1   Bilirubin Total      0.2 - 1.3 mg/dL 0.3   Albumin      3.4 - 5.0 g/dL 2.4 (L)   Protein Total      6.8 - 8.8 g/dL 4.6 (L)   Alkaline Phosphatase      40 - 150 U/L 50   ALT      0 - 50 U/L 366 (H)   AST      0 - 45 U/L 113 (H)       Colin Lang MD   Endocrinology Fellow  Pager: [phone]      Attending Note:     Patient was seen and examined with fellow Dr. Lang      The note reflects our mutual findings and plan.     Scott Up MD  [phone]  Endocrinology Service

## 2018-06-28 ENCOUNTER — TELEPHONE (OUTPATIENT)
Dept: ENDOCRINOLOGY | Facility: CLINIC | Age: 33
End: 2018-06-28

## 2018-06-28 ENCOUNTER — RECORDS - HEALTHEAST (OUTPATIENT)
Dept: ADMINISTRATIVE | Facility: OTHER | Age: 33
End: 2018-06-28

## 2018-06-28 VITALS
SYSTOLIC BLOOD PRESSURE: 137 MMHG | OXYGEN SATURATION: 100 % | TEMPERATURE: 98.6 F | HEART RATE: 93 BPM | RESPIRATION RATE: 18 BRPM | BODY MASS INDEX: 28.7 KG/M2 | HEIGHT: 64 IN | WEIGHT: 168.1 LBS | DIASTOLIC BLOOD PRESSURE: 78 MMHG

## 2018-06-28 LAB
ALBUMIN SERPL-MCNC: 2.7 G/DL (ref 3.4–5)
ALP SERPL-CCNC: 64 U/L (ref 40–150)
ALT SERPL W P-5'-P-CCNC: 267 U/L (ref 0–50)
ANION GAP SERPL CALCULATED.3IONS-SCNC: 8 MMOL/L (ref 3–14)
AST SERPL W P-5'-P-CCNC: 58 U/L (ref 0–45)
BILIRUB DIRECT SERPL-MCNC: <0.1 MG/DL (ref 0–0.2)
BILIRUB SERPL-MCNC: 0.4 MG/DL (ref 0.2–1.3)
BUN SERPL-MCNC: 5 MG/DL (ref 7–30)
CALCIUM SERPL-MCNC: 8.4 MG/DL (ref 8.5–10.1)
CHLORIDE SERPL-SCNC: 103 MMOL/L (ref 94–109)
CO2 SERPL-SCNC: 30 MMOL/L (ref 20–32)
CREAT SERPL-MCNC: 0.52 MG/DL (ref 0.52–1.04)
ERYTHROCYTE [DISTWIDTH] IN BLOOD BY AUTOMATED COUNT: 15.5 % (ref 10–15)
GFR SERPL CREATININE-BSD FRML MDRD: >90 ML/MIN/1.7M2
GLUCOSE SERPL-MCNC: 83 MG/DL (ref 70–99)
HCT VFR BLD AUTO: 25.5 % (ref 35–47)
HGB BLD-MCNC: 8.5 G/DL (ref 11.7–15.7)
MCH RBC QN AUTO: 29.9 PG (ref 26.5–33)
MCHC RBC AUTO-ENTMCNC: 33.3 G/DL (ref 31.5–36.5)
MCV RBC AUTO: 90 FL (ref 78–100)
PLATELET # BLD AUTO: 204 10E9/L (ref 150–450)
POTASSIUM SERPL-SCNC: 3.3 MMOL/L (ref 3.4–5.3)
PROT SERPL-MCNC: 5.8 G/DL (ref 6.8–8.8)
RBC # BLD AUTO: 2.84 10E12/L (ref 3.8–5.2)
SODIUM SERPL-SCNC: 142 MMOL/L (ref 133–144)
WBC # BLD AUTO: 12.9 10E9/L (ref 4–11)

## 2018-06-28 PROCEDURE — 25000132 ZZH RX MED GY IP 250 OP 250 PS 637: Performed by: PHYSICIAN ASSISTANT

## 2018-06-28 PROCEDURE — 80048 BASIC METABOLIC PNL TOTAL CA: CPT | Performed by: NURSE PRACTITIONER

## 2018-06-28 PROCEDURE — 80076 HEPATIC FUNCTION PANEL: CPT | Performed by: NURSE PRACTITIONER

## 2018-06-28 PROCEDURE — 25000132 ZZH RX MED GY IP 250 OP 250 PS 637: Performed by: STUDENT IN AN ORGANIZED HEALTH CARE EDUCATION/TRAINING PROGRAM

## 2018-06-28 PROCEDURE — 85027 COMPLETE CBC AUTOMATED: CPT | Performed by: NURSE PRACTITIONER

## 2018-06-28 PROCEDURE — 36415 COLL VENOUS BLD VENIPUNCTURE: CPT | Performed by: NURSE PRACTITIONER

## 2018-06-28 PROCEDURE — 25000128 H RX IP 250 OP 636: Performed by: UROLOGY

## 2018-06-28 PROCEDURE — 85049 AUTOMATED PLATELET COUNT: CPT | Performed by: UROLOGY

## 2018-06-28 PROCEDURE — 25000132 ZZH RX MED GY IP 250 OP 250 PS 637: Performed by: UROLOGY

## 2018-06-28 RX ORDER — HYDROCORTISONE 10 MG/1
10 TABLET ORAL DAILY
Qty: 30 TABLET | Refills: 1 | Status: SHIPPED | OUTPATIENT
Start: 2018-06-28 | End: 2018-07-25

## 2018-06-28 RX ORDER — PANTOPRAZOLE SODIUM 40 MG/1
40 TABLET, DELAYED RELEASE ORAL EVERY MORNING
Status: DISCONTINUED | OUTPATIENT
Start: 2018-06-28 | End: 2018-06-28 | Stop reason: HOSPADM

## 2018-06-28 RX ORDER — AMOXICILLIN 250 MG
1 CAPSULE ORAL 2 TIMES DAILY
Qty: 60 TABLET | Refills: 0 | Status: SHIPPED | OUTPATIENT
Start: 2018-06-28 | End: 2018-07-12

## 2018-06-28 RX ORDER — HYDROCORTISONE 20 MG/1
20 TABLET ORAL EVERY MORNING
Qty: 30 TABLET | Refills: 1 | Status: SHIPPED | OUTPATIENT
Start: 2018-06-28 | End: 2018-07-25

## 2018-06-28 RX ORDER — ACETAMINOPHEN 325 MG/1
650 TABLET ORAL EVERY 4 HOURS PRN
Qty: 150 TABLET | Refills: 0 | Status: SHIPPED | OUTPATIENT
Start: 2018-06-28

## 2018-06-28 RX ORDER — LIDOCAINE 4 G/G
2 PATCH TOPICAL EVERY 24 HOURS
Qty: 14 PATCH | Refills: 0 | Status: SHIPPED | OUTPATIENT
Start: 2018-06-28 | End: 2019-01-08

## 2018-06-28 RX ORDER — OXYCODONE HYDROCHLORIDE 10 MG/1
5-10 TABLET ORAL EVERY 4 HOURS PRN
Qty: 22 TABLET | Refills: 0 | Status: SHIPPED | OUTPATIENT
Start: 2018-06-28 | End: 2018-08-20

## 2018-06-28 RX ADMIN — OXYCODONE HYDROCHLORIDE 10 MG: 5 TABLET ORAL at 04:36

## 2018-06-28 RX ADMIN — LIDOCAINE 1 PATCH: 560 PATCH PERCUTANEOUS; TOPICAL; TRANSDERMAL at 09:21

## 2018-06-28 RX ADMIN — BUPROPION HYDROCHLORIDE 100 MG: 100 TABLET, FILM COATED ORAL at 09:11

## 2018-06-28 RX ADMIN — PANTOPRAZOLE SODIUM 40 MG: 40 TABLET, DELAYED RELEASE ORAL at 09:11

## 2018-06-28 RX ADMIN — HYDROCORTISONE 20 MG: 20 TABLET ORAL at 09:11

## 2018-06-28 RX ADMIN — SENNOSIDES AND DOCUSATE SODIUM 2 TABLET: 8.6; 5 TABLET ORAL at 09:22

## 2018-06-28 RX ADMIN — OXYCODONE HYDROCHLORIDE 5 MG: 5 TABLET ORAL at 09:20

## 2018-06-28 RX ADMIN — OXYCODONE HYDROCHLORIDE 10 MG: 5 TABLET ORAL at 12:59

## 2018-06-28 RX ADMIN — HEPARIN SODIUM 5000 UNITS: 5000 INJECTION, SOLUTION INTRAVENOUS; SUBCUTANEOUS at 00:49

## 2018-06-28 RX ADMIN — ACETAMINOPHEN 975 MG: 325 TABLET, FILM COATED ORAL at 11:13

## 2018-06-28 RX ADMIN — OXYCODONE HYDROCHLORIDE 5 MG: 5 TABLET ORAL at 09:11

## 2018-06-28 RX ADMIN — ACETAMINOPHEN 975 MG: 325 TABLET, FILM COATED ORAL at 04:36

## 2018-06-28 RX ADMIN — FLUOXETINE 20 MG: 20 CAPSULE ORAL at 09:11

## 2018-06-28 RX ADMIN — HEPARIN SODIUM 5000 UNITS: 5000 INJECTION, SOLUTION INTRAVENOUS; SUBCUTANEOUS at 09:11

## 2018-06-28 RX ADMIN — OXYCODONE HYDROCHLORIDE 10 MG: 5 TABLET ORAL at 00:48

## 2018-06-28 NOTE — PLAN OF CARE
"Problem: Patient Care Overview  Goal: Plan of Care/Patient Progress Review  Outcome: Adequate for Discharge Date Met: 06/28/18  /78 (BP Location: Left arm)  Pulse 93  Temp 98.6  F (37  C) (Oral)  Resp 18  Ht 1.63 m (5' 4.17\")  Wt 76.2 kg (168 lb 1.6 oz)  SpO2 100%  BMI 28.7 kg/m2     Neuro: A&O X 4  Cardiac: VSS  Respiratory: lung sounds clear bilaterally  GI/: passing flatus, no bm this shift  Diet/Appetite: tolerating regular diet  Skin: generalized bruising, bruising prominent on L arm  LDA: AIDE drain and IV removed before discharge  Activity: ambulating in [place]  Pain: controlled with 10 mg Oxy q 4 hrs  Plan: discharge to home.  Follow up with provider and endocrinology.  Discharge paperwork discussed. Pt verbalized understanding.        "

## 2018-06-28 NOTE — PROGRESS NOTES
Fluids d/c'd. R PIV saline locked. UO adequate, no BM, denies flatus. Post RBCs, Hgb recheck 8.3. Oxy 10 mg PO given x1, dilaudid 0.2 mg given x1. AIDE in place with scant leaking. Abdominal incision open to air. Pt tolerating regular diet. Ambulating halls.  at bedside. Potential d/c tomorrow. Transferred to [facility] on 7B at 1830.

## 2018-06-28 NOTE — PLAN OF CARE
Problem: Patient Care Overview  Goal: Plan of Care/Patient Progress Review  Outcome: No Change  [id number]  Neuro: A&Ox4. No deficits.   Cardiac: HR 80s, regular. BPs: 120s/70s. Denies chest pain.   Resp: Sating high 90s on RA. No complaints of SOB.   GI/: (+) flatus (-) BM, scheduled stool softeners given. Voiding adequate amounts not saving.   Diet/Appetite: Tolerating regular diet. Fair appetite.   Skin: Midline incision with staples, LADONNA. Using abdominal binder.   Access: R) PIV SL.  Drains: Midline AIDE.  Activity: up ad william.  Pain: Controlled with PRN Oxycodone x2 and scheduled Tylenol. Patient prefers to take pain medications with food.  Possibly DC today.     Will continue with plan of care and notify MD of any changes.

## 2018-06-28 NOTE — PLAN OF CARE
Problem: Patient Care Overview  Goal: Plan of Care/Patient Progress Review  Physical Therapy Discharge Summary    Reason for therapy discharge:    Discharged to home.    Progress towards therapy goal(s). See goals on Care Plan in [facility] electronic health record for goal details.  Goals not met.  Barriers to achieving goals:   discharge from facility. Pt has not MET any of goals listed in EMR but per chart has been up ambulating in hallways with family.    Therapy recommendation(s):    No further therapy is recommended.  Continue home exercise program.

## 2018-06-28 NOTE — TELEPHONE ENCOUNTER
[facility] Call Center    Phone Message    May a detailed message be left on voicemail: yes    Reason for Call: Other: [facility] Follow-Up from 6/28/2018 - They would like this patient to follow-up with Endocrinology in about 3 weeks.      Action Taken: Message routed to:  [facility] ([facility]): Endocrinology

## 2018-06-28 NOTE — PROGRESS NOTES
"Urology  Progress Note    Received 1 unit pRBC yesterday with appropriate response on recheck  No acute events overnight  Tolerating regular diet,   Denies flatus and BM    Exam  Vital signs:  Temp: 98.1  F (36.7  C) Temp src: Oral BP: 120/71 Pulse: 93 Heart Rate: 83 Resp: 16 SpO2: 100 % O2 Device: None (Room air) Oxygen Delivery: 2 LPM Height: 163 cm (5' 4.17\") Weight: 76.2 kg (168 lb 1.6 oz)  Estimated body mass index is 28.7 kg/(m^2) as calculated from the following:    Height as of this encounter: 1.63 m (5' 4.17\").    Weight as of this encounter: 76.2 kg (168 lb 1.6 oz).    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  Hepatic mobilization performed by transplant surgery.      Neuro: Pain well controlled with sched APAP, PRN PO oxycodone, IV Dilaudid;  Continue home fluoxetine, bupropion  CV: HDS  Pulm: incentive spirometry while awake  FEN/GI: Regular diet; Follow up on liver panel  Endo: Appreciate endocrinology recommendations - will touch base to clarify recommendations (appears that plan is to continue 25 mcg hydrocortisone TID until discharge with transition to 20 mg /10 mg maintenance until follow up)  : Voiding spontaneously with adequate urine output - remove AIDE  Heme/ID: Follow up on AM Hgb; SQ heparin  Activity: as tolerated, may shower  PPx: SCDs    Seen and examined with the chief resident. Will discuss with Dr. Donal Dawn MD  Urology Resident     Contacting the Urology Team     Please use the following job codes to reach the Urology Team. Note that you must use an in house phone and that " job codes cannot receive text pages.   On weekdays, dial [phone] (or star-star-star [phone] on the new [facility] telephones) then [phone] to reach the Adult Urology resident or PA on call  On weekdays, dial [phone] (or star-star-star 777 on the new [facility] telephones) then [phone] to reach the Pediatric Urology resident  On weeknights and weekends, dial [phone] (or star-star-star 777 on the new [facility] telephones) then [phone] to reach the Urology resident on call (for both Adult and Pediatrics)

## 2018-06-28 NOTE — DISCHARGE SUMMARY
[facility] Discharge Summary    Patient: Suzy Rodríguez    MRN: [id number]   : 1985         Date of Admission:  2018   Date of Discharge::  2018  Admitting Physician:  Warren Mansfield MD  Discharge Physician:  Yolanda Dupont PA-C             Admission Diagnoses:   Adrenal mass, right (H)    Past Medical History:   Diagnosis Date     Adrenal mass (H)              Discharge Diagnosis:   Right Adrenal Mass, path demonstrating - Adrenal Cortical Carcinoma, pT2NX    Past Medical History:   Diagnosis Date     Adrenal mass (H)             Procedures:   Procedure(s): 18 -     Right open adrenalectomy (midline incision)    Hepatic mobilization (performed by Transplant Surgery)  Dr. Warren Mansfield (Urology) and Dr. NELSON Cabezas (Transplant Surgery)            Medications Prior to Admission:     Prescriptions Prior to Admission   Medication Sig Dispense Refill Last Dose     buPROPion (WELLBUTRIN) 100 MG tablet TAKE 1 TABLET (100 MG TOTAL) BY MOUTH 2 (TWO) TIMES A DAY.  3 2018 at 0730     calcium carbonate antacid 1000 MG CHEW Take 1 tablet by mouth every morning    Past Week at Unknown time     cholecalciferol (VITAMIN D-1000 MAX ST) 1000 units TABS Take 2,000 Units by mouth every morning    Past Week at Unknown time     EPINEPHrine (EPIPEN/ADRENACLICK/OR ANY BX GENERIC EQUIV) 0.3 MG/0.3ML injection 2-pack As directed   Taking     FLUoxetine (PROZAC) 20 MG capsule Take 20 mg by mouth every morning    2018 at 0730     LORazepam (ATIVAN) 0.5 MG tablet Take 1 tablet (0.5 mg) by mouth every 8 hours as needed for anxiety 30 tablet 0 2018 at 2000     Multiple Vitamins-Calcium (ONE-A-DAY WOMENS PO) Take 2 tablets by mouth every morning    Past Week at Unknown time     [DISCONTINUED] Ferrous Sulfate (IRON SUPPLEMENT PO) Take 325 mg by mouth daily (with breakfast)   Past Week at Unknown time     [DISCONTINUED] SPIRONOLACTONE PO Take 25 mg by mouth every morning     6/25/2018 at 0730             Discharge Medications:     Current Discharge Medication List      START taking these medications    Details   acetaminophen (TYLENOL) 325 MG tablet Take 2 tablets (650 mg) by mouth every 4 hours as needed for other (multimodal surgical pain management along with NSAIDS and opioid medication as indicated based on pain control and physical function.)  Qty: 150 tablet, Refills: 0    Associated Diagnoses: Adrenal mass, right (H); Acute post-operative pain      enoxaparin (LOVENOX) 40 MG/0.4ML injection Inject 0.4 mLs (40 mg) Subcutaneous daily  Qty: 14 Syringe, Refills: 0    Associated Diagnoses: Adrenal mass, right (H); H/O MTHFR mutation      !! hydrocortisone (CORTEF) 10 MG tablet Take 1 tablet (10 mg) by mouth daily At 2pm until follow up with Endocrinology (total: 30mg daily)  Qty: 30 tablet, Refills: 1    Associated Diagnoses: Adrenal mass, right (H)      !! hydrocortisone (CORTEF) 20 MG tablet Take 1 tablet (20 mg) by mouth every morning (then 10mg at 2pm) until followup with Endocrinology  Qty: 30 tablet, Refills: 1    Associated Diagnoses: Adrenal mass, right (H)      Lidocaine (LIDOCARE) 4 % Patch Place 2 patches onto the skin every 24 hours  Qty: 14 patch, Refills: 0    Associated Diagnoses: Adrenal mass, right (H); Acute post-operative pain      oxyCODONE IR (ROXICODONE) 10 MG tablet Take 0.5-1 tablets (5-10 mg) by mouth every 4 hours as needed for moderate to severe pain  Qty: 22 tablet, Refills: 0    Associated Diagnoses: Adrenal mass, right (H); Acute post-operative pain      senna-docusate (SENOKOT-S;PERICOLACE) 8.6-50 MG per tablet Take 1 tablet by mouth 2 times daily To prevent constipation  Qty: 60 tablet, Refills: 0    Associated Diagnoses: Acute post-operative pain       !! - Potential duplicate medications found. Please discuss with provider.      CONTINUE these medications which have NOT CHANGED    Details   buPROPion (WELLBUTRIN) 100 MG tablet TAKE 1 TABLET (100 MG  TOTAL) BY MOUTH 2 (TWO) TIMES A DAY.  Refills: 3      calcium carbonate antacid 1000 MG CHEW Take 1 tablet by mouth every morning       cholecalciferol (VITAMIN D-1000 MAX ST) 1000 units TABS Take 2,000 Units by mouth every morning       EPINEPHrine (EPIPEN/ADRENACLICK/OR ANY BX GENERIC EQUIV) 0.3 MG/0.3ML injection 2-pack As directed      FLUoxetine (PROZAC) 20 MG capsule Take 20 mg by mouth every morning       LORazepam (ATIVAN) 0.5 MG tablet Take 1 tablet (0.5 mg) by mouth every 8 hours as needed for anxiety  Qty: 30 tablet, Refills: 0    Associated Diagnoses: Adrenal mass (H); Anxiety      Multiple Vitamins-Calcium (ONE-A-DAY WOMENS PO) Take 2 tablets by mouth every morning          STOP taking these medications       Ferrous Sulfate (IRON SUPPLEMENT PO) Comments:   Reason for Stopping:         SPIRONOLACTONE PO Comments:   Reason for Stopping:                     Consultations:   Consultation during this admission received: Transplant Surgery, Endocrinology, PT, OT,           Brief History of Illness:   Reason for admission requiring a surgical or invasive procedure:   Adrenal Mass    The patient underwent the following procedure(s):   See above   SIGNIFICANT INTRAOPERATIVE FINDINGS:  1) Transplant team assisted with hepatic mobilization. Small fracture of right lobe managed conservatively.   2) Diaphragmatic injury during hepatic mobilization repaired in two layers and negative leak test at conclusion of closure. Decision made not to leave chest tube.  3) Visually negative margins on adrenal mass resection  Please refer to the full operative summary for details.           Hospital Course:   The patient's hospital course was remarkable for labile blood pressures and low urine output overnight on POD#0. On the morning of POD#1 a rapid response was called for elevated lactate (5.0).   All three were managed with fluid resuscitation.  Stress dose steroids were also begun, and on POD#1 Endocrinology was consulted  to guide steroid management in the setting of a right adrenal mass with functional hypersecretion of glucocorticoids/DHEAS.  Their team assisted with a steroid taper and ultimately recommended discharge on PO Cortef 20mg qam and 10mg at 2pm daily until followup with outpatient Endocrinology in 3 weeks.     On POD#2 Suzy's Diaz was removed and she voided without difficulty, however her hemoglobin had dipped to 6.8g/dL from 7.9 the previous morning, so she was transfused 1u PRBCs.  Following this subcutaneous heparin was begun with hemoglobin levels being trended to assure stability.  Her hemoglobin remained stable overnight, thus prophylactic lovenox was begun, a medication that would be continued for 14 days to prevent blood clots given history of MTHFR.     Transplant Surgery followed along throughout the hospital stay with LFTs being followed to assure they downtrended as expected.     On POD #3 Suzy was ambulating without assitance, tolerating the discharge diet, had pain controlled with PO medications to go home with, and was requiring no IV medications or fluids.  Her drain was removed prior to discharge given low outputs. She was discharged home with appropriate contact information, follow-up and instructions as seen below in the discharge paperwork.         Discharge Labs:     No results found for: PSA    Recent Labs  Lab 06/28/18  0734 06/27/18  1616 06/27/18  0517 06/26/18  2222  06/26/18  0417   WBC 12.9*  --  11.5* 16.0*  --  18.6*   HGB 8.5* 8.3*  8.2* 6.8* 7.4*  < > 7.9*     --  170 201  --  213   < > = values in this interval not displayed.    Recent Labs  Lab 06/28/18  0734 06/27/18  0517 06/26/18  0417 06/25/18  2045    141 142 142   POTASSIUM 3.3* 3.5 4.5 3.6   CHLORIDE 103 106 105 107   CO2 30 28 24 20   BUN 5* 10 14 10   CR 0.52 0.68 0.81 0.60   GLC 83 88 138* 158*   SHASAH 8.4* 7.6* 8.1* 8.2*     No lab results found in last 7 days.    Invalid input(s): URINEBLOOD  No results  found for this or any previous visit.    Results for orders placed or performed during the hospital encounter of 06/25/18   XR Chest Port 1 View    Narrative    Exam:  XR CHEST PORT 1 VW, 6/25/2018 8:39 PM    History: Resection of large adrenal mass. Diaphragmatic injury noted  and repaired. CXR to assess for pneumothorax;     Comparison:  Chest CT 6/14/2018.    Findings:  Single AP view of the chest. Cardiac silhouette is within  normal limits. No pleural effusion. Small right apical pneumothorax.  No focal airspace opacities. Bibasilar atelectasis. Surgical clips in  the upper abdomen. Bones are unremarkable.      Impression    Impression:    1. Small right apical pneumothorax.  2. Bibasilar atelectasis.    [Result: Right pneumothorax]    Finding was identified on 6/25/2018 8:39 PM.     Dr. Rosa was contacted by Dr. Correa at 6/25/2018 8:44 PM and  verbalized understanding of the urgent finding.     I have personally reviewed the examination and initial interpretation  and I agree with the findings.    ADDIE LOVE MD   XR Chest 2 Views    Narrative    Exam: XR CHEST 2 VW, 6/26/2018 8:56 AM    Indication: Postop day 1 from resection of large adrenal mass.  Diaphragmatic injury noted and repaired.;     Comparison: Radiograph of the chest 6/25/2018    Findings:   PA and lateral views of the chest. Pulmonary vasculature is distinct.  Cardiac silhouette is within normal limits. Normal lung volumes. Mild  bibasilar opacities. Small right apical pneumothorax. No left  pneumothorax. Postsurgical changes of right adrenal mass resection.  The upper abdomen is otherwise unremarkable.      Impression    Impression:   Slightly increased small right apical pneumothorax.      [Result: Increasing pneumothorax]    Finding was identified on 6/26/2018 9:10 AM.     Dr. Rosa was contacted by Dr. Hernandez at 6/26/2018 9:14 AM and  verbalized understanding of the urgent finding.     I have personally reviewed the examination  and initial interpretation  and I agree with the findings.    JM LUI MD   Creatinine   Result Value Ref Range    Creatinine 0.63 0.52 - 1.04 mg/dL    GFR Estimate >90 >60 mL/min/1.7m2    GFR Estimate If Black >90 >60 mL/min/1.7m2   Potassium   Result Value Ref Range    Potassium 3.0 (L) 3.4 - 5.3 mmol/L   Hemoglobin   Result Value Ref Range    Hemoglobin 16.5 (H) 11.7 - 15.7 g/dL   Glucose by meter   Result Value Ref Range    Glucose 90 70 - 99 mg/dL   HCG qualitative Blood   Result Value Ref Range    HCG Qualitative Serum Negative NEG^Negative   Arterial Panel   Result Value Ref Range    pH Arterial 7.38 7.35 - 7.45 pH    pCO2 Arterial 45 35 - 45 mm Hg    pO2 Arterial 108 (H) 80 - 105 mm Hg    Bicarbonate Arterial 27 21 - 28 mmol/L    Base Excess Art 1.0 mmol/L    FIO2 50.0     Sodium 137 133 - 144 mmol/L    Potassium 3.8 3.4 - 5.3 mmol/L    Hemoglobin 14.4 11.7 - 15.7 g/dL    Glucose 178 (H) 70 - 99 mg/dL    Calcium Ionized Whole Blood 4.6 4.4 - 5.2 mg/dL   Lactic acid whole blood   Result Value Ref Range    Lactic Acid 3.4 (H) 0.7 - 2.0 mmol/L   Arterial Panel   Result Value Ref Range    pH Arterial 7.34 (L) 7.35 - 7.45 pH    pCO2 Arterial 43 35 - 45 mm Hg    pO2 Arterial 187 (H) 80 - 105 mm Hg    Bicarbonate Arterial 23 21 - 28 mmol/L    Base Deficit Art 2.7 mmol/L    FIO2 50     Sodium 136 133 - 144 mmol/L    Potassium 3.8 3.4 - 5.3 mmol/L    Hemoglobin 10.9 (L) 11.7 - 15.7 g/dL    Glucose 262 (H) 70 - 99 mg/dL    Calcium Ionized Whole Blood 4.4 4.4 - 5.2 mg/dL   Lactic acid whole blood   Result Value Ref Range    Lactic Acid 5.7 (HH) 0.7 - 2.0 mmol/L   Arterial Panel   Result Value Ref Range    pH Arterial 7.34 (L) 7.35 - 7.45 pH    pCO2 Arterial 45 35 - 45 mm Hg    pO2 Arterial 198 (H) 80 - 105 mm Hg    Bicarbonate Arterial 24 21 - 28 mmol/L    Base Deficit Art 1.9 mmol/L    FIO2 50     Sodium 135 133 - 144 mmol/L    Potassium 3.3 (L) 3.4 - 5.3 mmol/L    Hemoglobin 9.2 (L) 11.7 - 15.7 g/dL    Glucose  262 (H) 70 - 99 mg/dL    Calcium Ionized Whole Blood 5.7 (H) 4.4 - 5.2 mg/dL   Lactic acid whole blood   Result Value Ref Range    Lactic Acid 5.1 (HH) 0.7 - 2.0 mmol/L   CBC with platelets   Result Value Ref Range    WBC 23.0 (H) 4.0 - 11.0 10e9/L    RBC Count 2.88 (L) 3.8 - 5.2 10e12/L    Hemoglobin 8.8 (L) 11.7 - 15.7 g/dL    Hematocrit 26.7 (L) 35.0 - 47.0 %    MCV 93 78 - 100 fl    MCH 30.6 26.5 - 33.0 pg    MCHC 33.0 31.5 - 36.5 g/dL    RDW 14.7 10.0 - 15.0 %    Platelet Count 220 150 - 450 10e9/L   Basic metabolic panel   Result Value Ref Range    Sodium 142 133 - 144 mmol/L    Potassium 3.6 3.4 - 5.3 mmol/L    Chloride 107 94 - 109 mmol/L    Carbon Dioxide 20 20 - 32 mmol/L    Anion Gap 15 (H) 3 - 14 mmol/L    Glucose 158 (H) 70 - 99 mg/dL    Urea Nitrogen 10 7 - 30 mg/dL    Creatinine 0.60 0.52 - 1.04 mg/dL    GFR Estimate >90 >60 mL/min/1.7m2    GFR Estimate If Black >90 >60 mL/min/1.7m2    Calcium 8.2 (L) 8.5 - 10.1 mg/dL   Glucose by meter   Result Value Ref Range    Glucose 155 (H) 70 - 99 mg/dL   Platelet count   Result Value Ref Range    Platelet Count 258 150 - 450 10e9/L   Hemoglobin and hematocrit   Result Value Ref Range    Hemoglobin 8.8 (L) 11.7 - 15.7 g/dL    Hematocrit 27.0 (L) 35.0 - 47.0 %   Basic metabolic panel   Result Value Ref Range    Sodium 142 133 - 144 mmol/L    Potassium 4.5 3.4 - 5.3 mmol/L    Chloride 105 94 - 109 mmol/L    Carbon Dioxide 24 20 - 32 mmol/L    Anion Gap 13 3 - 14 mmol/L    Glucose 138 (H) 70 - 99 mg/dL    Urea Nitrogen 14 7 - 30 mg/dL    Creatinine 0.81 0.52 - 1.04 mg/dL    GFR Estimate 81 >60 mL/min/1.7m2    GFR Estimate If Black >90 >60 mL/min/1.7m2    Calcium 8.1 (L) 8.5 - 10.1 mg/dL   CBC with platelets   Result Value Ref Range    WBC 18.6 (H) 4.0 - 11.0 10e9/L    RBC Count 2.62 (L) 3.8 - 5.2 10e12/L    Hemoglobin 7.9 (L) 11.7 - 15.7 g/dL    Hematocrit 24.1 (L) 35.0 - 47.0 %    MCV 92 78 - 100 fl    MCH 30.2 26.5 - 33.0 pg    MCHC 32.8 31.5 - 36.5 g/dL    RDW  15.0 10.0 - 15.0 %    Platelet Count 213 150 - 450 10e9/L   Lactic acid whole blood   Result Value Ref Range    Lactic Acid 4.8 (HH) 0.7 - 2.0 mmol/L   Lactic acid whole blood   Result Value Ref Range    Lactic Acid 5.0 (HH) 0.7 - 2.0 mmol/L   Hemoglobin   Result Value Ref Range    Hemoglobin 7.2 (L) 11.7 - 15.7 g/dL   CBC with platelets   Result Value Ref Range    WBC 16.0 (H) 4.0 - 11.0 10e9/L    RBC Count 2.51 (L) 3.8 - 5.2 10e12/L    Hemoglobin 7.4 (L) 11.7 - 15.7 g/dL    Hematocrit 22.8 (L) 35.0 - 47.0 %    MCV 91 78 - 100 fl    MCH 29.5 26.5 - 33.0 pg    MCHC 32.5 31.5 - 36.5 g/dL    RDW 15.2 (H) 10.0 - 15.0 %    Platelet Count 201 150 - 450 10e9/L   Lactic acid whole blood   Result Value Ref Range    Lactic Acid 2.4 (H) 0.7 - 2.0 mmol/L   CBC with platelets   Result Value Ref Range    WBC 11.5 (H) 4.0 - 11.0 10e9/L    RBC Count 2.28 (L) 3.8 - 5.2 10e12/L    Hemoglobin 6.8 (LL) 11.7 - 15.7 g/dL    Hematocrit 20.6 (L) 35.0 - 47.0 %    MCV 90 78 - 100 fl    MCH 29.8 26.5 - 33.0 pg    MCHC 33.0 31.5 - 36.5 g/dL    RDW 15.4 (H) 10.0 - 15.0 %    Platelet Count 170 150 - 450 10e9/L   Basic metabolic panel   Result Value Ref Range    Sodium 141 133 - 144 mmol/L    Potassium 3.5 3.4 - 5.3 mmol/L    Chloride 106 94 - 109 mmol/L    Carbon Dioxide 28 20 - 32 mmol/L    Anion Gap 7 3 - 14 mmol/L    Glucose 88 70 - 99 mg/dL    Urea Nitrogen 10 7 - 30 mg/dL    Creatinine 0.68 0.52 - 1.04 mg/dL    GFR Estimate >90 >60 mL/min/1.7m2    GFR Estimate If Black >90 >60 mL/min/1.7m2    Calcium 7.6 (L) 8.5 - 10.1 mg/dL   Hepatic panel   Result Value Ref Range    Bilirubin Direct <0.1 0.0 - 0.2 mg/dL    Bilirubin Total 0.3 0.2 - 1.3 mg/dL    Albumin 2.4 (L) 3.4 - 5.0 g/dL    Protein Total 4.6 (L) 6.8 - 8.8 g/dL    Alkaline Phosphatase 50 40 - 150 U/L     (H) 0 - 50 U/L     (H) 0 - 45 U/L   Hemoglobin   Result Value Ref Range    Hemoglobin 8.3 (L) 11.7 - 15.7 g/dL   Hemoglobin   Result Value Ref Range    Hemoglobin 8.2  (L) 11.7 - 15.7 g/dL   Hepatic panel   Result Value Ref Range    Bilirubin Direct <0.1 0.0 - 0.2 mg/dL    Bilirubin Total 0.4 0.2 - 1.3 mg/dL    Albumin 2.7 (L) 3.4 - 5.0 g/dL    Protein Total 5.8 (L) 6.8 - 8.8 g/dL    Alkaline Phosphatase 64 40 - 150 U/L     (H) 0 - 50 U/L    AST 58 (H) 0 - 45 U/L   CBC with platelets   Result Value Ref Range    WBC 12.9 (H) 4.0 - 11.0 10e9/L    RBC Count 2.84 (L) 3.8 - 5.2 10e12/L    Hemoglobin 8.5 (L) 11.7 - 15.7 g/dL    Hematocrit 25.5 (L) 35.0 - 47.0 %    MCV 90 78 - 100 fl    MCH 29.9 26.5 - 33.0 pg    MCHC 33.3 31.5 - 36.5 g/dL    RDW 15.5 (H) 10.0 - 15.0 %    Platelet Count 204 150 - 450 10e9/L   Basic metabolic panel   Result Value Ref Range    Sodium 142 133 - 144 mmol/L    Potassium 3.3 (L) 3.4 - 5.3 mmol/L    Chloride 103 94 - 109 mmol/L    Carbon Dioxide 30 20 - 32 mmol/L    Anion Gap 8 3 - 14 mmol/L    Glucose 83 70 - 99 mg/dL    Urea Nitrogen 5 (L) 7 - 30 mg/dL    Creatinine 0.52 0.52 - 1.04 mg/dL    GFR Estimate >90 >60 mL/min/1.7m2    GFR Estimate If Black >90 >60 mL/min/1.7m2    Calcium 8.4 (L) 8.5 - 10.1 mg/dL   Endocrine NON-Diabetes Adult IP Consult: Patient to be seen: ASAP within 4 hrs; Call back #: [phone]; Management of steroid dosing, fresh postop from resection of adrenal mass (Cushing's); Consultant may enter orders: Yes    Narrative    Scott Up MD     6/26/2018  3:18 PM  Inpatient Endocrinology Consult   Patient: Suzy Rodríguez   MRN: [id number]  Date of Service: 6/26/2018    Chief Complaint:     We were asked to evaluate the patient at the request of Dr. Rosa for evaluation of patient with right adrenal mass   secreting glucocorticoids s/p surgical resection - ?assistance   with steroid taper and whether spironolactone should be   discontinued.     Assessment/Recs:    Right adrenal mass, functional hypersecretion of   glucocorticoids/DHEAS - patient is doing well post-operatively.   Based on the operative  report, the entire mass was removed with   clean margins. The size of the tumor is concerning for carcinoma   however it still is most likely to be benign especially in the   setting of differentiated cortisol secretion. Since we do not   know how long she has been in an excess glucocorticoid state, she   will need to be on hydrocortisone until she can properly be   testing for adrenal insufficiency as an outpatient. Agree with   rapid taper if possible but we will need to monitor for symptoms   of adrenal insufficiency - fatigue, nausea/vomiting, dizziness,   lyte abnormalities, hypotension, etc.   Plan:  -decrease IV hydrocortisone to 25 mcg q8 hrs for today  -we will reassess tomorrow whether we should further taper her   dose. On several occasions, cushing's syndrome patient require   slower taper since they have prolonged hypercortisolemia prior to   the surgery.   -discontinue spironolactone - no longer needed   -f/u pathology report    HTN -- BP well controlled.   Agree with discontinuing Spironolactone.     Patient was seen and discussed with Dr. Up, staff   endocrinologist.     ==============    History of Present Illness:  Suzy Rodríguez is a 32 year old woman with a history of   polycystic ovary syndrome who was admitted for open surgical   resection of a right adrenal mass measuring 9.2x8 cm in size.  It   was discovered incidentally on CT scan of abdomen and pelvis on   5/8/2018 for flank pain due to nephrolithiasis. [facility] and had removal of a   0.6 mm kidney stone.    She saw Dr. Beverly on 5/25/18 for evaluation of the right adrenal   mass. She had reported feeling more depressed in the past few   months. Her blood pressure was notably elevated though she does   not have a diagnosis of hypertension and does not monitor her   blood pressure at home.  She recalls her blood pressure being   elevated as far back as 3 years ago in the  130-140/80-90 range.     Her history is notable for gastric bypass surgery 2 years ago.   She did not have any imaging at that time so we do not know if   the adrenal mass was there at that time. Her pre-surgery weight   was 235 lbs, lowest weight 135 lbs. She gained about 20 lbs over   the course of 8 months, but thought it was related to foot injury   and possibly depression. She reported easy bruising, rounding of   the face, increased facial hair (required electrolysis and   technician noted her hair seemed refractory to treatment, and   irregular menses. She has 7 and 10 year old boys. Recalls having   a couple of miscarriages in the past.     She had a full screening panel with normal plasma metanpehrines   and aldosterone/renin (ratio <20). Her ACTH was <10 and she had   elevated urine free cortisol (351.6, N <45), AM cortisol 23, and   DHEAS of 740.     She underwent open adrenalectomy on 2018. Operative report   noted margins were visual negative. Post-operatively, she had a   lactic acidosis. Labile blood pressures /55-71 overnight.   AIDE drain in place. She received 100 mg hydrocortisone   pre-operatively and is now on 50 mg q8 hrs.       Currently she feels ok. Some pain. Appetite is ok. She is   understandably anxious about the mass and what it might be.   No dizziness, syncopal episodes.   No chest pain or problems with breathing.     Current Problem List:   Patient Active Problem List   Diagnosis     Adrenal mass, right (H)       Past Medical and Past Surgical History:  Past Medical History:   Diagnosis Date     Adrenal mass (H)        Past Surgical History:   Procedure Laterality Date      SECTION      twice     GASTRIC BYPASS         Medications:   Current Facility-Administered Medications   Medication     0.9% sodium chloride BOLUS     [START ON 2018] acetaminophen (TYLENOL) tablet 650 mg     acetaminophen (TYLENOL) tablet 975 mg     benzocaine-menthol (CEPACOL) 15-3.6 MG  "lozenge 1 lozenge     bupivacaine liposome (EXPAREL) LONG ACTING injection was   administered into the infiltration site to produce postsurgical   analgesia. Duration of action is up to 72 hours, and other   \"bari\" medications should not be given for 96 hours with the   exception of the lidocaine 5% patch (LIDODERM) and the lidocaine   10mg in potassium infusions. This entry is for INFORMATION ONLY.     buPROPion (WELLBUTRIN) tablet 100 mg     diphenhydrAMINE (BENADRYL) capsule 25 mg    Or     diphenhydrAMINE (BENADRYL) injection 25 mg     FLUoxetine (PROzac) capsule 20 mg     hydrocortisone sodium succinate PF (solu-CORTEF) injection 50   mg     HYDROmorphone (DILAUDID) PCA 1 mg/mL OPIOID NAIVE     lactated ringers infusion     Lidocaine (LIDOCARE) 4 % Patch 1 patch     lidocaine (LMX4) cream     lidocaine 1 % 1 mL     lidocaine patch in PLACE     lidocaine patch REMOVAL     LORazepam (ATIVAN) tablet 0.5 mg     naloxone (NARCAN) injection 0.1-0.4 mg     ondansetron (ZOFRAN-ODT) ODT tab 4 mg    Or     ondansetron (ZOFRAN) injection 4 mg     pantoprazole (PROTONIX) 40 mg IV push injection     senna-docusate (SENOKOT-S;PERICOLACE) 8.6-50 MG per tablet 1   tablet    Or     senna-docusate (SENOKOT-S;PERICOLACE) 8.6-50 MG per tablet 2   tablet     sodium chloride (PF) 0.9% PF flush 3 mL     sodium chloride (PF) 0.9% PF flush 3 mL     sodium chloride 0.9% infusion     No current outpatient prescriptions on file.       Allergies:   Allergies   Allergen Reactions     Bee Venom Swelling     Ibuprofen Other (See Comments), Hives and Swelling       Social History:  Social History   Substance Use Topics     Smoking status: Never Smoker     Smokeless tobacco: Never Used     Alcohol use Yes      Comment: occ.       Family History:  History reviewed. No pertinent family history.   No history of adrenal problems in family.     Complete ROS is otherwise unremarkable    Physical Examination:  Blood pressure 103/57, pulse 71, " "temperature 96  F (35.6  C),   temperature source Oral, resp. rate 16, height 1.63 m (5' 4.17\"),   weight 72.9 kg (160 lb 11.5 oz), SpO2 100 %, not currently   breastfeeding.  GEN: Awake, alert, no distress.  HEENT: EOMI.  NECK: Thyroid non-palpable, non-tender. No cervical or clavicular   LAD.   CV: RRR with no murmur.   RESP: CTA bilaterally.   EXT: Trace peripheral edema.   SKIN: Bruising of lower extremities, subtle striae of abdomen   (not Cushingoid appearing)  NEURO: Non-focal.    Labs:   Component      Latest Ref Rng & Units 6/26/2018   Sodium      133 - 144 mmol/L 142   Potassium      3.4 - 5.3 mmol/L 4.5   Chloride      94 - 109 mmol/L 105   Carbon Dioxide      20 - 32 mmol/L 24   Anion Gap      3 - 14 mmol/L 13   Glucose      70 - 99 mg/dL 138 (H)   Urea Nitrogen      7 - 30 mg/dL 14   Creatinine      0.52 - 1.04 mg/dL 0.81   GFR Estimate      >60 mL/min/1.7m2 81   GFR Estimate If Black      >60 mL/min/1.7m2 >90   Calcium      8.5 - 10.1 mg/dL 8.1 (L)   WBC      4.0 - 11.0 10e9/L 18.6 (H)   RBC Count      3.8 - 5.2 10e12/L 2.62 (L)   Hemoglobin      11.7 - 15.7 g/dL 7.9 (L)   Hematocrit      35.0 - 47.0 % 24.1 (L)   MCV      78 - 100 fl 92   MCH      26.5 - 33.0 pg 30.2   MCHC      31.5 - 36.5 g/dL 32.8   RDW      10.0 - 15.0 % 15.0   Platelet Count      150 - 450 10e9/L 213   Lactic Acid      0.7 - 2.0 mmol/L 5.0 (HH)     Component      Latest Ref Rng & Units 5/28/2018 5/28/2018 5/31/2018           6:30 AM  6:30 AM    Metanephrine Duration Urine      hr  24    Volume      mL 2425 2425    Metanephr 24 H ur/cr      0 - 300 ug/g CRT  42    Metanephrine 24 Hr/Random Urine      39 - 143 ug/d  56    Metanephrine Urine per Volume      ug/L  23    Normetaneph 24H ur/cr      0 - 400 ug/g CRT  116    Normetanephrine 24 Hr/Random Urine      109 - 393 ug/d  155    Normetanephrine Urine per Volume      ug/L  64    Metanephrine 24 Hr/Random Urine Interpretation        SEE NOTE    Creatinine For Methaneph 24 H/Random " Urine      mg/dL  55    Creatinine 24 hour      700 - 1600 mg/d  1334    Cortisol Free Duration Urine      h 24     Cortisol ug/g creatinine      ug/g .64     Cortisol Free Urine Random      ug/L 145.00     Cortisol Free Urine      <=45.0 ug/d 351.6 (H)     Creat/Vol      mg/dL 55     Creat/24hr      700 - 1600 mg/d 1334     Cortisol Free Urine Intrepretation       SEE NOTE     Cortisol Serum      4 - 22 ug/dL   23.0 (H)   Aldosterone      ng/dL   13.4   Renin Activity      ng/mL/hr   0.8   Adrenal Corticotropin      <47 pg/mL      DHEA Sulfate      35 - 430 ug/dL        Component      Latest Ref Rng & Units 6/5/2018             Metanephrine Duration Urine      hr    Volume      mL    Metanephr 24 H ur/cr      0 - 300 ug/g CRT    Metanephrine 24 Hr/Random Urine      39 - 143 ug/d    Metanephrine Urine per Volume      ug/L    Normetaneph 24H ur/cr      0 - 400 ug/g CRT    Normetanephrine 24 Hr/Random Urine      109 - 393 ug/d    Normetanephrine Urine per Volume      ug/L    Metanephrine 24 Hr/Random Urine Interpretation          Creatinine For Methaneph 24 H/Random Urine      mg/dL    Creatinine 24 hour      700 - 1600 mg/d    Cortisol Free Duration Urine      h    Cortisol ug/g creatinine      ug/g CRT    Cortisol Free Urine Random      ug/L    Cortisol Free Urine      <=45.0 ug/d    Creat/Vol      mg/dL    Creat/24hr      700 - 1600 mg/d    Cortisol Free Urine Intrepretation          Cortisol Serum      4 - 22 ug/dL    Aldosterone      ng/dL    Renin Activity      ng/mL/hr    Adrenal Corticotropin      <47 pg/mL <10   DHEA Sulfate      35 - 430 ug/dL 740 (H)     Imaging:     Colin Lang MD   Endocrinology Fellow  Pager: [phone]    Patient was discussed with attending physician, Dr Up.      Attending Note:     Patient was seen and examined with fellow Dr. Lang. The note   reflects our mutual findings and plan.     Scott Up MD  [id number]  Endocrinology Service                Discharge  Instructions and Follow-Up:   Diet:  - Regular    Activity:   - Until followup, wear your binder when out of bed  - No strenuous exercise for 6 weeks.   - No lifting, pushing, pulling more than 10 pounds for 6 weeks. Take care when pushing with your arms to stand up.  - Do not strain your belly area.  When you bend, sit up or twice, you could strain the area around your incision.    - Do not strain with bowel movements.    - Do not drive until you can press the brake pedal quickly and fully without pain.    - Do not operate a motor vehicle while taking narcotic pain medications.     Medications:   1) PAIN: Oxycodone is a narcotic medication that has been prescribed for pain.  Narcotics will cause sleepiness and constipation and can become addictive, therefore it is best to stop or reduce them as soon as you can.  Any left over narcotics should be disposed of with an Authorized  for unneeded medications.  Contact your [facility]'s or [facility]'s household trash and recycling service to learn about medication disposal options and guidelines for your area.  If you decide to store this medication at home it should be kept in a locked cabinet to prevent access to children or visitors. To reduce your narcotic use, take both Tylenol (acetaminophen 625mg) and ibuprofen (600mg), alternating between these medications every 3 hours.  These have been prescribed for you.  Do not take more than 4,000mg of Tylenol (acetaminophen/ APAP) from all sources in any 24 hour period since this can cause liver damage.  Do not take more than 2400mg of ibuprofen in any 24 hour period since this can cause kidney damage. Never drive, operate machinery or drink alcoholic beverages while you are taking narcotic pain medications.      2) CONSTIPATION: Pericolace (senna/docusate sodium) can be taken twice daily for prevention of constipation since surgery, pain medications and bladder spasm medications can all make you constipated.   Please reduce or stop pericolace if you develop loose stools. Other over the counter solutions such as prune juice, miralax, fiber products, senna, and dulcolax can also be used. If you are taking the pericolace but still have not had a bowel movement in 3 days, start over-the-counter Milk of Magnesia taken twice daily until you have a nice bowel movement.  Call the office with any concerns.     3) Cortef (steroids) should be continued until your follow up with Endocrinology in about 3 weeks.    - Take 20mg of Cortef each morning then an additional 10mg at 2pm each afternoon.     4) ANTICOAGULATION  - COntinue Lovenox (enoxaparen) 40mg daily subcutaneous injections for 14 days (or until you follow up with Dr. Ceron)     Incisions:   - Daily showering is important, and you may get incisions wet starting 48 hours after surgery.  - Do not scrub incisions or soak in a bath, pool, hot tub, etc. Until advised by Dr. Ceron  - The wound dressing should be removed 48 hours after surgery.   - The purple skin glue on your incisions will flake off with time and should not be scrubbed.  Also avoid applying lotions or ointments to these areas.  - If steri-strips were used you may wash over them normally, as you would wash your remaining skin.  Steri-strips should fall off on their own within 5-10 days.   - Staples will be removed at your follow up appointment with Dr. Ceron  - It is preferable for the incision to be left uncovered, but you may cover with gauze if needed for comfort or to protect clothing from drainage.     Follow-Up:   - Call your primary care provider to touch base regarding your recent admission.   - Follow up with Dr. Ceron as scheduled in about 2 weeks  - Schedule an appointment to be seen by Endocrinology in about 3 weeks  - Call or return sooner than your regularly scheduled visit if you develop any of the following:  Fever (greater than 101.3F) or flu-like symptoms, uncontrolled pain, uncontrolled  nausea or vomiting, as well as increased redness, swelling, or drainage from your wound.    Phone numbers:  - Nursing phone helpline at the Urology Clinic (8A-5P M-F):  [phone].    - Nights or weekends, call [phone] and ask the  to page the urology resident on call.   - For emergencies, always call 911         Discharge Disposition:   Discharged to home      Attestation: I have reviewed today's vital signs, notes, medications, labs and imaging.    RAS SaldivarC  Urology Physician Assistant  Personal Pager: [phone]    Please call Job Code:   [phone] to reach the Urology resident or PA on call - Weekdays  [phone] to reach the Urology resident or PA on call - Weeknights and weekends    June 28, 2018

## 2018-06-29 ENCOUNTER — CARE COORDINATION (OUTPATIENT)
Dept: CARE COORDINATION | Facility: CLINIC | Age: 33
End: 2018-06-29

## 2018-06-29 ENCOUNTER — HOSPITAL ENCOUNTER (OUTPATIENT)
Facility: CLINIC | Age: 33
Setting detail: OBSERVATION
Discharge: OTHER INSTITUTION | End: 2018-06-30
Attending: FAMILY MEDICINE | Admitting: INTERNAL MEDICINE
Payer: COMMERCIAL

## 2018-06-29 ENCOUNTER — RECORDS - HEALTHEAST (OUTPATIENT)
Dept: ADMINISTRATIVE | Facility: OTHER | Age: 33
End: 2018-06-29

## 2018-06-29 ENCOUNTER — APPOINTMENT (OUTPATIENT)
Dept: CT IMAGING | Facility: CLINIC | Age: 33
End: 2018-06-29
Attending: FAMILY MEDICINE
Payer: COMMERCIAL

## 2018-06-29 ENCOUNTER — CARE COORDINATION (OUTPATIENT)
Dept: UROLOGY | Facility: CLINIC | Age: 33
End: 2018-06-29

## 2018-06-29 DIAGNOSIS — R50.9 FEVER, UNSPECIFIED FEVER CAUSE: ICD-10-CM

## 2018-06-29 DIAGNOSIS — G89.18 ACUTE POST-OPERATIVE PAIN: ICD-10-CM

## 2018-06-29 PROBLEM — R07.9 CHEST PAIN: Status: ACTIVE | Noted: 2018-06-29

## 2018-06-29 LAB
ALBUMIN SERPL-MCNC: 2.6 G/DL (ref 3.4–5)
ALBUMIN UR-MCNC: NEGATIVE MG/DL
ALP SERPL-CCNC: 78 U/L (ref 40–150)
ALT SERPL W P-5'-P-CCNC: 173 U/L (ref 0–50)
ANION GAP SERPL CALCULATED.3IONS-SCNC: 8 MMOL/L (ref 3–14)
APPEARANCE UR: ABNORMAL
AST SERPL W P-5'-P-CCNC: 35 U/L (ref 0–45)
BASOPHILS # BLD AUTO: 0 10E9/L (ref 0–0.2)
BASOPHILS NFR BLD AUTO: 0.3 %
BILIRUB SERPL-MCNC: 0.7 MG/DL (ref 0.2–1.3)
BILIRUB UR QL STRIP: NEGATIVE
BUN SERPL-MCNC: 9 MG/DL (ref 7–30)
CALCIUM SERPL-MCNC: 8.5 MG/DL (ref 8.5–10.1)
CHLORIDE SERPL-SCNC: 102 MMOL/L (ref 94–109)
CO2 SERPL-SCNC: 30 MMOL/L (ref 20–32)
COLOR UR AUTO: YELLOW
COPATH REPORT: NORMAL
CREAT SERPL-MCNC: 0.59 MG/DL (ref 0.52–1.04)
DIFFERENTIAL METHOD BLD: ABNORMAL
EOSINOPHIL # BLD AUTO: 0 10E9/L (ref 0–0.7)
EOSINOPHIL NFR BLD AUTO: 0.2 %
ERYTHROCYTE [DISTWIDTH] IN BLOOD BY AUTOMATED COUNT: 14.6 % (ref 10–15)
GFR SERPL CREATININE-BSD FRML MDRD: >90 ML/MIN/1.7M2
GLUCOSE SERPL-MCNC: 93 MG/DL (ref 70–99)
GLUCOSE UR STRIP-MCNC: NEGATIVE MG/DL
HCT VFR BLD AUTO: 25.2 % (ref 35–47)
HGB BLD-MCNC: 8.3 G/DL (ref 11.7–15.7)
HGB UR QL STRIP: ABNORMAL
IMM GRANULOCYTES # BLD: 0.2 10E9/L (ref 0–0.4)
IMM GRANULOCYTES NFR BLD: 1.7 %
INR PPP: 0.89 (ref 0.86–1.14)
KETONES UR STRIP-MCNC: NEGATIVE MG/DL
LACTATE BLD-SCNC: 2.2 MMOL/L (ref 0.7–2)
LEUKOCYTE ESTERASE UR QL STRIP: ABNORMAL
LIPASE SERPL-CCNC: 144 U/L (ref 73–393)
LYMPHOCYTES # BLD AUTO: 1.7 10E9/L (ref 0.8–5.3)
LYMPHOCYTES NFR BLD AUTO: 17.8 %
MCH RBC QN AUTO: 29.6 PG (ref 26.5–33)
MCHC RBC AUTO-ENTMCNC: 32.9 G/DL (ref 31.5–36.5)
MCV RBC AUTO: 90 FL (ref 78–100)
MONOCYTES # BLD AUTO: 0.7 10E9/L (ref 0–1.3)
MONOCYTES NFR BLD AUTO: 7.5 %
MUCOUS THREADS #/AREA URNS LPF: PRESENT /LPF
NEUTROPHILS # BLD AUTO: 7.1 10E9/L (ref 1.6–8.3)
NEUTROPHILS NFR BLD AUTO: 72.5 %
NITRATE UR QL: NEGATIVE
NRBC # BLD AUTO: 0 10*3/UL
NRBC BLD AUTO-RTO: 0 /100
PH UR STRIP: 8 PH (ref 5–7)
PLATELET # BLD AUTO: 256 10E9/L (ref 150–450)
POTASSIUM SERPL-SCNC: 3.1 MMOL/L (ref 3.4–5.3)
PROT SERPL-MCNC: 6 G/DL (ref 6.8–8.8)
RBC # BLD AUTO: 2.8 10E12/L (ref 3.8–5.2)
RBC #/AREA URNS AUTO: 3 /HPF (ref 0–2)
SODIUM SERPL-SCNC: 140 MMOL/L (ref 133–144)
SOURCE: ABNORMAL
SP GR UR STRIP: 1.01 (ref 1–1.03)
SQUAMOUS #/AREA URNS AUTO: 8 /HPF (ref 0–1)
TROPONIN I SERPL-MCNC: <0.015 UG/L (ref 0–0.04)
UROBILINOGEN UR STRIP-MCNC: 0 MG/DL (ref 0–2)
WBC # BLD AUTO: 9.7 10E9/L (ref 4–11)
WBC #/AREA URNS AUTO: 54 /HPF (ref 0–5)

## 2018-06-29 PROCEDURE — 99285 EMERGENCY DEPT VISIT HI MDM: CPT | Mod: 25

## 2018-06-29 PROCEDURE — 96361 HYDRATE IV INFUSION ADD-ON: CPT

## 2018-06-29 PROCEDURE — 25000132 ZZH RX MED GY IP 250 OP 250 PS 637: Performed by: INTERNAL MEDICINE

## 2018-06-29 PROCEDURE — 87040 BLOOD CULTURE FOR BACTERIA: CPT | Performed by: FAMILY MEDICINE

## 2018-06-29 PROCEDURE — 96375 TX/PRO/DX INJ NEW DRUG ADDON: CPT

## 2018-06-29 PROCEDURE — 71260 CT THORAX DX C+: CPT

## 2018-06-29 PROCEDURE — 25000125 ZZHC RX 250: Performed by: FAMILY MEDICINE

## 2018-06-29 PROCEDURE — 25000132 ZZH RX MED GY IP 250 OP 250 PS 637: Performed by: FAMILY MEDICINE

## 2018-06-29 PROCEDURE — 85610 PROTHROMBIN TIME: CPT | Performed by: FAMILY MEDICINE

## 2018-06-29 PROCEDURE — 80053 COMPREHEN METABOLIC PANEL: CPT | Performed by: FAMILY MEDICINE

## 2018-06-29 PROCEDURE — 96365 THER/PROPH/DIAG IV INF INIT: CPT

## 2018-06-29 PROCEDURE — 99207 ZZC CDG-HISTORY COMP: MEETS EXP. PROB FOCUSED- DOWN CODED LACK OF PFSH: CPT | Performed by: INTERNAL MEDICINE

## 2018-06-29 PROCEDURE — 96376 TX/PRO/DX INJ SAME DRUG ADON: CPT

## 2018-06-29 PROCEDURE — 84484 ASSAY OF TROPONIN QUANT: CPT | Performed by: FAMILY MEDICINE

## 2018-06-29 PROCEDURE — 99285 EMERGENCY DEPT VISIT HI MDM: CPT | Mod: 25 | Performed by: FAMILY MEDICINE

## 2018-06-29 PROCEDURE — 85025 COMPLETE CBC W/AUTO DIFF WBC: CPT | Performed by: FAMILY MEDICINE

## 2018-06-29 PROCEDURE — 81003 URINALYSIS AUTO W/O SCOPE: CPT | Performed by: FAMILY MEDICINE

## 2018-06-29 PROCEDURE — 99218 ZZC INITIAL OBSERVATION CARE,LEVL I: CPT | Performed by: INTERNAL MEDICINE

## 2018-06-29 PROCEDURE — 87086 URINE CULTURE/COLONY COUNT: CPT | Performed by: FAMILY MEDICINE

## 2018-06-29 PROCEDURE — 25000128 H RX IP 250 OP 636: Performed by: FAMILY MEDICINE

## 2018-06-29 PROCEDURE — 83690 ASSAY OF LIPASE: CPT | Performed by: FAMILY MEDICINE

## 2018-06-29 PROCEDURE — 25000128 H RX IP 250 OP 636: Performed by: INTERNAL MEDICINE

## 2018-06-29 PROCEDURE — 96372 THER/PROPH/DIAG INJ SC/IM: CPT

## 2018-06-29 PROCEDURE — 25000128 H RX IP 250 OP 636

## 2018-06-29 PROCEDURE — G0378 HOSPITAL OBSERVATION PER HR: HCPCS

## 2018-06-29 PROCEDURE — 36415 COLL VENOUS BLD VENIPUNCTURE: CPT | Performed by: FAMILY MEDICINE

## 2018-06-29 PROCEDURE — 83605 ASSAY OF LACTIC ACID: CPT | Performed by: FAMILY MEDICINE

## 2018-06-29 RX ORDER — HYDROMORPHONE HYDROCHLORIDE 1 MG/ML
.3-.5 INJECTION, SOLUTION INTRAMUSCULAR; INTRAVENOUS; SUBCUTANEOUS
Status: DISCONTINUED | OUTPATIENT
Start: 2018-06-29 | End: 2018-06-30 | Stop reason: HOSPADM

## 2018-06-29 RX ORDER — POTASSIUM CHLORIDE 1.5 G/1.58G
20-40 POWDER, FOR SOLUTION ORAL
Status: DISCONTINUED | OUTPATIENT
Start: 2018-06-29 | End: 2018-06-30 | Stop reason: HOSPADM

## 2018-06-29 RX ORDER — ONDANSETRON 2 MG/ML
4 INJECTION INTRAMUSCULAR; INTRAVENOUS EVERY 6 HOURS PRN
Status: DISCONTINUED | OUTPATIENT
Start: 2018-06-29 | End: 2018-06-30 | Stop reason: HOSPADM

## 2018-06-29 RX ORDER — NALOXONE HYDROCHLORIDE 0.4 MG/ML
.1-.4 INJECTION, SOLUTION INTRAMUSCULAR; INTRAVENOUS; SUBCUTANEOUS
Status: DISCONTINUED | OUTPATIENT
Start: 2018-06-29 | End: 2018-06-29

## 2018-06-29 RX ORDER — AMOXICILLIN 250 MG
1 CAPSULE ORAL 2 TIMES DAILY PRN
Status: DISCONTINUED | OUTPATIENT
Start: 2018-06-29 | End: 2018-06-30 | Stop reason: HOSPADM

## 2018-06-29 RX ORDER — SODIUM CHLORIDE, SODIUM LACTATE, POTASSIUM CHLORIDE, CALCIUM CHLORIDE 600; 310; 30; 20 MG/100ML; MG/100ML; MG/100ML; MG/100ML
1000 INJECTION, SOLUTION INTRAVENOUS CONTINUOUS
Status: DISCONTINUED | OUTPATIENT
Start: 2018-06-29 | End: 2018-06-30 | Stop reason: DRUGHIGH

## 2018-06-29 RX ORDER — POTASSIUM CHLORIDE 7.45 MG/ML
10 INJECTION INTRAVENOUS
Status: DISCONTINUED | OUTPATIENT
Start: 2018-06-29 | End: 2018-06-30 | Stop reason: HOSPADM

## 2018-06-29 RX ORDER — ONDANSETRON 4 MG/1
4 TABLET, ORALLY DISINTEGRATING ORAL EVERY 6 HOURS PRN
Status: DISCONTINUED | OUTPATIENT
Start: 2018-06-29 | End: 2018-06-29

## 2018-06-29 RX ORDER — POTASSIUM CL/LIDO/0.9 % NACL 10MEQ/0.1L
10 INTRAVENOUS SOLUTION, PIGGYBACK (ML) INTRAVENOUS
Status: DISCONTINUED | OUTPATIENT
Start: 2018-06-29 | End: 2018-06-30 | Stop reason: HOSPADM

## 2018-06-29 RX ORDER — OXYCODONE HYDROCHLORIDE 5 MG/1
5-10 TABLET ORAL EVERY 4 HOURS PRN
Status: DISCONTINUED | OUTPATIENT
Start: 2018-06-29 | End: 2018-06-30 | Stop reason: HOSPADM

## 2018-06-29 RX ORDER — LEVOFLOXACIN 5 MG/ML
500 INJECTION, SOLUTION INTRAVENOUS EVERY 24 HOURS
Status: DISCONTINUED | OUTPATIENT
Start: 2018-06-29 | End: 2018-06-30 | Stop reason: HOSPADM

## 2018-06-29 RX ORDER — ONDANSETRON 2 MG/ML
4 INJECTION INTRAMUSCULAR; INTRAVENOUS ONCE
Status: COMPLETED | OUTPATIENT
Start: 2018-06-29 | End: 2018-06-29

## 2018-06-29 RX ORDER — POTASSIUM CL/LIDO/0.9 % NACL 10MEQ/0.1L
10 INTRAVENOUS SOLUTION, PIGGYBACK (ML) INTRAVENOUS
Status: DISCONTINUED | OUTPATIENT
Start: 2018-06-29 | End: 2018-06-29

## 2018-06-29 RX ORDER — SODIUM CHLORIDE 9 MG/ML
INJECTION, SOLUTION INTRAVENOUS CONTINUOUS
Status: DISCONTINUED | OUTPATIENT
Start: 2018-06-29 | End: 2018-06-29

## 2018-06-29 RX ORDER — LORAZEPAM 1 MG/1
1 TABLET ORAL EVERY 4 HOURS PRN
Status: DISCONTINUED | OUTPATIENT
Start: 2018-06-29 | End: 2018-06-29

## 2018-06-29 RX ORDER — HYDROCORTISONE 10 MG/1
10 TABLET ORAL DAILY
Status: DISCONTINUED | OUTPATIENT
Start: 2018-06-30 | End: 2018-06-30 | Stop reason: HOSPADM

## 2018-06-29 RX ORDER — SODIUM CHLORIDE, SODIUM LACTATE, POTASSIUM CHLORIDE, CALCIUM CHLORIDE 600; 310; 30; 20 MG/100ML; MG/100ML; MG/100ML; MG/100ML
INJECTION, SOLUTION INTRAVENOUS CONTINUOUS
Status: DISCONTINUED | OUTPATIENT
Start: 2018-06-29 | End: 2018-06-30 | Stop reason: HOSPADM

## 2018-06-29 RX ORDER — PROCHLORPERAZINE MALEATE 10 MG
10 TABLET ORAL EVERY 6 HOURS PRN
Status: DISCONTINUED | OUTPATIENT
Start: 2018-06-29 | End: 2018-06-30 | Stop reason: HOSPADM

## 2018-06-29 RX ORDER — IOPAMIDOL 755 MG/ML
74 INJECTION, SOLUTION INTRAVASCULAR ONCE
Status: COMPLETED | OUTPATIENT
Start: 2018-06-29 | End: 2018-06-29

## 2018-06-29 RX ORDER — ACETAMINOPHEN 325 MG/1
650 TABLET ORAL EVERY 4 HOURS PRN
Status: DISCONTINUED | OUTPATIENT
Start: 2018-06-29 | End: 2018-06-29

## 2018-06-29 RX ORDER — LORAZEPAM 2 MG/ML
1 INJECTION INTRAMUSCULAR EVERY 4 HOURS PRN
Status: DISCONTINUED | OUTPATIENT
Start: 2018-06-29 | End: 2018-06-29

## 2018-06-29 RX ORDER — CEFAZOLIN SODIUM 1 G/50ML
2000 SOLUTION INTRAVENOUS ONCE
Status: COMPLETED | OUTPATIENT
Start: 2018-06-29 | End: 2018-06-29

## 2018-06-29 RX ORDER — POTASSIUM CHLORIDE 1500 MG/1
20-40 TABLET, EXTENDED RELEASE ORAL
Status: DISCONTINUED | OUTPATIENT
Start: 2018-06-29 | End: 2018-06-30 | Stop reason: HOSPADM

## 2018-06-29 RX ORDER — ACETAMINOPHEN 650 MG/1
650 SUPPOSITORY RECTAL EVERY 4 HOURS PRN
Status: DISCONTINUED | OUTPATIENT
Start: 2018-06-29 | End: 2018-06-30 | Stop reason: HOSPADM

## 2018-06-29 RX ORDER — LORAZEPAM 2 MG/ML
1 INJECTION INTRAMUSCULAR ONCE
Status: COMPLETED | OUTPATIENT
Start: 2018-06-29 | End: 2018-06-29

## 2018-06-29 RX ORDER — ONDANSETRON 2 MG/ML
4 INJECTION INTRAMUSCULAR; INTRAVENOUS EVERY 6 HOURS PRN
Status: DISCONTINUED | OUTPATIENT
Start: 2018-06-29 | End: 2018-06-29

## 2018-06-29 RX ORDER — NALOXONE HYDROCHLORIDE 0.4 MG/ML
.1-.4 INJECTION, SOLUTION INTRAMUSCULAR; INTRAVENOUS; SUBCUTANEOUS
Status: DISCONTINUED | OUTPATIENT
Start: 2018-06-29 | End: 2018-06-30 | Stop reason: HOSPADM

## 2018-06-29 RX ORDER — LIDOCAINE 4 G/G
2 PATCH TOPICAL EVERY 24 HOURS
Status: DISCONTINUED | OUTPATIENT
Start: 2018-06-29 | End: 2018-06-30 | Stop reason: HOSPADM

## 2018-06-29 RX ORDER — PROCHLORPERAZINE 25 MG
25 SUPPOSITORY, RECTAL RECTAL EVERY 12 HOURS PRN
Status: DISCONTINUED | OUTPATIENT
Start: 2018-06-29 | End: 2018-06-30 | Stop reason: HOSPADM

## 2018-06-29 RX ORDER — BUPROPION HYDROCHLORIDE 100 MG/1
100 TABLET ORAL 2 TIMES DAILY
Status: DISCONTINUED | OUTPATIENT
Start: 2018-06-29 | End: 2018-06-30 | Stop reason: HOSPADM

## 2018-06-29 RX ORDER — HYDROMORPHONE HYDROCHLORIDE 1 MG/ML
0.5 INJECTION, SOLUTION INTRAMUSCULAR; INTRAVENOUS; SUBCUTANEOUS
Status: DISCONTINUED | OUTPATIENT
Start: 2018-06-29 | End: 2018-06-29

## 2018-06-29 RX ORDER — POTASSIUM CHLORIDE 29.8 MG/ML
20 INJECTION INTRAVENOUS
Status: DISCONTINUED | OUTPATIENT
Start: 2018-06-29 | End: 2018-06-30 | Stop reason: HOSPADM

## 2018-06-29 RX ORDER — AMOXICILLIN 250 MG
2 CAPSULE ORAL 2 TIMES DAILY PRN
Status: DISCONTINUED | OUTPATIENT
Start: 2018-06-29 | End: 2018-06-30 | Stop reason: HOSPADM

## 2018-06-29 RX ORDER — LORAZEPAM 0.5 MG/1
0.5 TABLET ORAL EVERY 4 HOURS PRN
Status: DISCONTINUED | OUTPATIENT
Start: 2018-06-29 | End: 2018-06-30 | Stop reason: HOSPADM

## 2018-06-29 RX ORDER — POLYETHYLENE GLYCOL 3350 17 G/17G
17 POWDER, FOR SOLUTION ORAL DAILY
Status: DISCONTINUED | OUTPATIENT
Start: 2018-06-29 | End: 2018-06-30 | Stop reason: HOSPADM

## 2018-06-29 RX ORDER — ACETAMINOPHEN 325 MG/1
650 TABLET ORAL EVERY 4 HOURS PRN
Status: DISCONTINUED | OUTPATIENT
Start: 2018-06-29 | End: 2018-06-30 | Stop reason: HOSPADM

## 2018-06-29 RX ORDER — ONDANSETRON 4 MG/1
4 TABLET, ORALLY DISINTEGRATING ORAL EVERY 6 HOURS PRN
Status: DISCONTINUED | OUTPATIENT
Start: 2018-06-29 | End: 2018-06-30 | Stop reason: HOSPADM

## 2018-06-29 RX ADMIN — HYDROMORPHONE HYDROCHLORIDE 0.5 MG: 1 INJECTION, SOLUTION INTRAMUSCULAR; INTRAVENOUS; SUBCUTANEOUS at 21:43

## 2018-06-29 RX ADMIN — OXYCODONE HYDROCHLORIDE 10 MG: 5 TABLET ORAL at 20:29

## 2018-06-29 RX ADMIN — ENOXAPARIN SODIUM 40 MG: 40 INJECTION SUBCUTANEOUS at 20:16

## 2018-06-29 RX ADMIN — VANCOMYCIN HYDROCHLORIDE 2000 MG: 10 INJECTION, POWDER, LYOPHILIZED, FOR SOLUTION INTRAVENOUS at 21:44

## 2018-06-29 RX ADMIN — ONDANSETRON 4 MG: 2 INJECTION INTRAMUSCULAR; INTRAVENOUS at 14:15

## 2018-06-29 RX ADMIN — POTASSIUM CHLORIDE 40 MEQ: 1500 TABLET, EXTENDED RELEASE ORAL at 21:17

## 2018-06-29 RX ADMIN — SODIUM CHLORIDE, POTASSIUM CHLORIDE, SODIUM LACTATE AND CALCIUM CHLORIDE: 600; 310; 30; 20 INJECTION, SOLUTION INTRAVENOUS at 20:16

## 2018-06-29 RX ADMIN — LORAZEPAM 0.5 MG: 0.5 TABLET ORAL at 23:11

## 2018-06-29 RX ADMIN — SODIUM CHLORIDE 100 ML: 9 INJECTION, SOLUTION INTRAVENOUS at 14:36

## 2018-06-29 RX ADMIN — LEVOFLOXACIN 500 MG: 5 INJECTION, SOLUTION INTRAVENOUS at 20:18

## 2018-06-29 RX ADMIN — SODIUM CHLORIDE 1000 ML: 9 INJECTION, SOLUTION INTRAVENOUS at 15:38

## 2018-06-29 RX ADMIN — POTASSIUM CHLORIDE 20 MEQ: 1500 TABLET, EXTENDED RELEASE ORAL at 23:12

## 2018-06-29 RX ADMIN — TAZOBACTAM SODIUM AND PIPERACILLIN SODIUM 4.5 G: 500; 4 INJECTION, SOLUTION INTRAVENOUS at 17:16

## 2018-06-29 RX ADMIN — SODIUM CHLORIDE, POTASSIUM CHLORIDE, SODIUM LACTATE AND CALCIUM CHLORIDE 1000 ML: 600; 310; 30; 20 INJECTION, SOLUTION INTRAVENOUS at 14:08

## 2018-06-29 RX ADMIN — BUPROPION HYDROCHLORIDE 100 MG: 100 TABLET, FILM COATED ORAL at 20:16

## 2018-06-29 RX ADMIN — LORAZEPAM 1 MG: 1 TABLET ORAL at 18:56

## 2018-06-29 RX ADMIN — IOPAMIDOL 74 ML: 755 INJECTION, SOLUTION INTRAVENOUS at 14:35

## 2018-06-29 RX ADMIN — LORAZEPAM 1 MG: 2 INJECTION INTRAMUSCULAR; INTRAVENOUS at 15:40

## 2018-06-29 RX ADMIN — HYDROMORPHONE HYDROCHLORIDE 0.5 MG: 1 INJECTION, SOLUTION INTRAMUSCULAR; INTRAVENOUS; SUBCUTANEOUS at 14:17

## 2018-06-29 RX ADMIN — POLYETHYLENE GLYCOL 3350 17 G: 17 POWDER, FOR SOLUTION ORAL at 20:16

## 2018-06-29 NOTE — PROGRESS NOTES
Urology Postop Phone Note:  Ms. Suzy Rodríguez is a 32 year old female who underwent Right open adrenalectomy (midline incision) and Hepatic mobilization (performed by transplant) on 6-25-18 with Dr. Mansfield for a history of right adrenal mass.  Her postoperative course was remarkable for a small right apical pneumothorax and the patient was d/c to home on 6/28/18.    Fevers/chills: Patient denies any fever or chills.  Eating/drinking: Patient is able to eat and drink without any complaints.  Bowel habits: Patient reports constipation.  She is passing gas and is not concerned about this at this time.  Urine output Volitional   Incisions: Patient denies any signs and symptoms of infection.  Pain: Patient reports pain as a 5 out of 10.  The pain is located at incisional site and see below as well.  Narcotics: The patient has been taking Oxycodone for pain medication and continues to report pain.    Patient states that she is having right shoulder pain and twitching of the muscles under her right breast.  When she is up and moving it is better but if she stops to rest or take a deep breath it worsens.  Called and spoke to Dr. Mansfield, he would like patient to go to local ER to be evaluated.  Patient states understanding and will go to [place] [facility].    Follow up appointment scheduled on 7/12/18 at 7:40 with Dr. Mansfield.    Informed the patient of the clinic triage nursing # ([phone] option 2) and urology resident on call # for after hours concerns.    Thanks,   Genny Mccray   Department of Urologic Surgery

## 2018-06-29 NOTE — IP AVS SNAPSHOT
"    [facility] SURGICAL: [phone]                                              INTERAGENCY TRANSFER FORM - PHYSICIAN ORDERS   2018                    Hospital Admission Date: 2018  VALERY PANDYA   : 1985  Sex: Female        Attending Provider: Jorge Silverman MD     Allergies:  Bee Venom, Ibuprofen    Infection:  None   Service:  GENERAL MEDI    Ht:  1.626 m (5' 4\")   Wt:  78 kg (171 lb 15.3 oz)   Admission Wt:  78 kg (171 lb 15.3 oz)    BMI:  29.52 kg/m 2   BSA:  1.88 m 2            Patient PCP Information     Provider PCP Type    [facility]      ED Clinical Impression     Diagnosis Description Comment Added By Time Added    Acute post-operative pain [G89.18] Acute post-operative pain [G89.18]  Abilio Staples MD 2018  4:45 PM    Fever, unspecified fever cause [R50.9] Fever, unspecified fever cause [R50.9] Etiology unclear Abilio Staples MD 2018  4:46 PM      Hospital Problems as of 2018              Priority Class Noted POA    * (Principal)Acute post-operative pain Medium  2018 Yes    Chest pain Medium  2018 Yes    Postoperative anemia due to acute blood loss Medium  2018 Yes    Pleural effusion on right Medium  2018 Yes    Ascites Medium  2018 Yes    Adrenal cortex cancer, 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      Non-Hospital Problems as of 2018              Priority Class Noted    Adrenal mass, right (H) Medium  2018      Code Status History     Date Active Date Inactive Code Status Order ID Comments User Context    2018  8:26 AM  Full Code [id number]  Jorge Silverman MD Outpatient    2018 10:12 PM 2018  3:58 PM Full Code [id number]  Rufino Rosa MD Inpatient         Medication Review      CONTINUE these medications which have NOT CHANGED        Dose / Directions Comments    acetaminophen 325 MG tablet   Commonly known as:  TYLENOL   Used for:  Adrenal mass, right " (H)        Dose:  650 mg   Take 2 tablets (650 mg) by mouth every 4 hours as needed for other (multimodal surgical pain management along with NSAIDS and opioid medication as indicated based on pain control and physical function.)   Quantity:  150 tablet   Refills:  0        buPROPion 100 MG tablet   Commonly known as:  WELLBUTRIN        TAKE 1 TABLET (100 MG TOTAL) BY MOUTH 2 (TWO) TIMES A DAY.   Refills:  3        calcium carbonate antacid 1000 MG Chew        Dose:  1 tablet   Take 1 tablet by mouth every morning   Refills:  0        enoxaparin 40 MG/0.4ML injection   Commonly known as:  LOVENOX   Used for:  Adrenal mass, right (H), H/O MTHFR mutation        Dose:  40 mg   Inject 0.4 mLs (40 mg) Subcutaneous daily   Quantity:  14 Syringe   Refills:  0        EPINEPHrine 0.3 MG/0.3ML injection 2-pack   Commonly known as:  EPIPEN/ADRENACLICK/or ANY BX GENERIC EQUIV        As directed for bee stings   Refills:  0        FLUoxetine 20 MG capsule   Commonly known as:  PROzac        Dose:  20 mg   Take 20 mg by mouth every morning   Refills:  0        * hydrocortisone 20 MG tablet   Commonly known as:  CORTEF   Used for:  Adrenal mass, right (H)        Dose:  20 mg   Take 1 tablet (20 mg) by mouth every morning (then 10mg at 2pm) until followup with Endocrinology   Quantity:  30 tablet   Refills:  1        * hydrocortisone 10 MG tablet   Commonly known as:  CORTEF   Used for:  Adrenal mass, right (H)        Dose:  10 mg   Take 1 tablet (10 mg) by mouth daily At 2pm until follow up with Endocrinology (total: 30mg daily)   Quantity:  30 tablet   Refills:  1        Lidocaine 4 % Patch   Commonly known as:  LIDOCARE   Used for:  Adrenal mass, right (H)        Dose:  2 patch   Place 2 patches onto the skin every 24 hours   Quantity:  14 patch   Refills:  0        LORazepam 0.5 MG tablet   Commonly known as:  ATIVAN   Used for:  Adrenal mass (H), Anxiety        Dose:  0.5 mg   Take 1 tablet (0.5 mg) by mouth every 8 hours as  needed for anxiety   Quantity:  30 tablet   Refills:  0        ONE-A-DAY WOMENS PO        Dose:  2 tablet   Take 2 tablets by mouth every morning   Refills:  0        oxyCODONE IR 10 MG tablet   Commonly known as:  ROXICODONE   Used for:  Adrenal mass, right (H)        Dose:  5-10 mg   Take 0.5-1 tablets (5-10 mg) by mouth every 4 hours as needed for moderate to severe pain   Quantity:  22 tablet   Refills:  0        senna-docusate 8.6-50 MG per tablet   Commonly known as:  SENOKOT-S;PERICOLACE        Dose:  1 tablet   Take 1 tablet by mouth 2 times daily To prevent constipation   Quantity:  60 tablet   Refills:  0        VITAMIN D-1000 MAX ST 1000 units Tabs   Generic drug:  cholecalciferol        Dose:  2000 Units   Take 2,000 Units by mouth every morning   Refills:  0        * Notice:  This list has 2 medication(s) that are the same as other medications prescribed for you. Read the directions carefully, and ask your doctor or other care provider to review them with you.            After Care     Advance Diet as Tolerated       Follow this diet upon discharge: Orders Placed This Encounter      Advance Diet as Tolerated: Regular Diet Adult             Your next 10 appointments already scheduled     Jul 12, 2018  7:40 AM CDT   (Arrive by 7:25 AM)   Post-Op with Warren Mansfield MD   [facility]rologic [facility] ([facility])    [address] [place] [address]  [address] [facility] [id number] [phone]            Jul 25, 2018 11:20 AM CDT   (Arrive by 11:05 AM)   RETURN ENDOCRINE with Roel Beverly MD   [facility] Endocrinology ([facility])    [address] [place] [address]  [address] [facility] [id number]   [phone]              Statement of Approval     Ordered          06/30/18 0826  I have reviewed and agree with all the recommendations and orders detailed in this document.  EFFECTIVE NOW     Approved and electronically  signed by:  Jorge Silverman MD

## 2018-06-29 NOTE — IP AVS SNAPSHOT
"[facility] SURGICAL: [phone]                                              INTERAGENCY TRANSFER FORM - LAB / IMAGING / EKG / EMG RESULTS   2018                    Hospital Admission Date: 2018  VALERY PANDYA   : 1985  Sex: Female        Attending Provider: Jorge Silverman MD     Allergies:  Bee Venom, Ibuprofen    Infection:  None   Service:  GENERAL MEDI    Ht:  1.626 m (5' 4\")   Wt:  78 kg (171 lb 15.3 oz)   Admission Wt:  78 kg (171 lb 15.3 oz)    BMI:  29.52 kg/m 2   BSA:  1.88 m 2            Patient PCP Information     Provider PCP Type    [facility]         Lab Results - 3 Days      Urine Culture Aerobic Bacterial [[id number]]  Resulted: 18 1124, Result status: Preliminary result    Ordering provider: Abilio Staples MD  18 1326 Resulting lab: [facility]    Specimen Information    Type Source Collected On   Midstream Urine  18 1326          Components       Value Reference Range Flag Lab   Specimen Description Midstream Urine      Special Requests Specimen received in preservative   75   Culture Micro Culture in progress   225            Blood culture [[id number]]  Resulted: 18 0709, Result status: Preliminary result    Ordering provider: Abilio Staples MD  18 1320 Resulting lab: [facility]    Specimen Information    Type Source Collected On   Blood  18 1359   Comment:  Right Hand          Components       Value Reference Range Flag Lab   Specimen Description Blood Right Hand      Special Requests Aerobic and anaerobic bottles received   75   Culture Micro No growth after 14 hours   225            Blood culture [[id number]]  Resulted: 18 0709, Result status: Preliminary result    Ordering provider: Abilio Staples MD  18 1320 Resulting lab: [facility]    Specimen Information    Type " Source Collected On   Blood  06/29/18 1407   Comment:  Left Arm          Components       Value Reference Range Flag Lab   Specimen Description Blood Left Arm      Special Requests Aerobic and anaerobic bottles received   75   Culture Micro No growth after 14 hours   225            CBC with platelets [[id number]] (Abnormal)  Resulted: 06/30/18 0422, Result status: Final result    Ordering provider: Nishi Sheffield MD  06/30/18 0336 Resulting lab: [facility]    Specimen Information    Type Source Collected On   Blood  06/30/18 0345          Components       Value Reference Range Flag Lab   WBC 7.8 4.0 - 11.0 10e9/L  59   RBC Count 2.37 3.8 - 5.2 10e12/L L 59   Hemoglobin 7.1 11.7 - 15.7 g/dL L 59   Hematocrit 22.8 35.0 - 47.0 % L 59   MCV 96 78 - 100 fl  59   MCH 30.0 26.5 - 33.0 pg  59   MCHC 31.1 31.5 - 36.5 g/dL L 59   RDW 15.6 10.0 - 15.0 % H 59   Platelet Count 240 150 - 450 10e9/L  59            Basic metabolic panel [[id number]] (Abnormal)  Resulted: 06/30/18 0416, Result status: Final result    Ordering provider: Nishi Sheffield MD  06/30/18 0336 Resulting lab: [facility]    Specimen Information    Type Source Collected On   Blood  06/30/18 0345          Components       Value Reference Range Flag Lab   Sodium 139 133 - 144 mmol/L  59   Potassium 3.8 3.4 - 5.3 mmol/L  59   Chloride 104 94 - 109 mmol/L  59   Carbon Dioxide 30 20 - 32 mmol/L  59   Anion Gap 5 3 - 14 mmol/L  59   Glucose 81 70 - 99 mg/dL  59   Urea Nitrogen 6 7 - 30 mg/dL L 59   Creatinine 0.62 0.52 - 1.04 mg/dL  59   GFR Estimate >90 >60 mL/min/1.7m2  59   Comment:  Non  GFR Calc   GFR Estimate If Black >90 >60 mL/min/1.7m2  59   Comment:  African American GFR Calc   Calcium 8.2 8.5 - 10.1 mg/dL L 59            UA reflex to Microscopic and Culture [[id number]] (Abnormal)  Resulted: 06/29/18 1406, Result status: Edited Result - FINAL    Ordering provider: Abilio Staples MD  06/29/18 7201  Resulting lab: [facility]    Specimen Information    Type Source Collected On   Midstream Urine Urine clean catch 06/29/18 1326          Components       Value Reference Range Flag Lab   Color Urine Yellow   59   Appearance Urine Slightly Cloudy   59   Glucose Urine Negative NEG^Negative mg/dL  59   Bilirubin Urine Negative NEG^Negative  59   Ketones Urine Negative NEG^Negative mg/dL  59   Specific Gravity Urine 1.006 1.003 - 1.035  59   Blood Urine Large NEG^Negative A 59   pH Urine 8.0 5.0 - 7.0 pH H 59   Protein Albumin Urine Negative NEG^Negative mg/dL  59   Urobilinogen mg/dL 0.0 0.0 - 2.0 mg/dL  59   Nitrite Urine Negative NEG^Negative  59   Leukocyte Esterase Urine Moderate NEG^Negative A 59   Source Midstream Urine   59   RBC Urine 3 0 - 2 /HPF H 59   WBC Urine 54 0 - 5 /HPF H 59   Squamous Epithelial /HPF Urine 8 0 - 1 /HPF H 59   Mucous Urine Present NEG^Negative /LPF A 59            Comprehensive metabolic panel [[id number]] (Abnormal)  Resulted: 06/29/18 1403, Result status: Final result    Ordering provider: Abilio Staples MD  06/29/18 1241 Resulting lab: [facility]    Specimen Information    Type Source Collected On   Blood  06/29/18 1310          Components       Value Reference Range Flag Lab   Sodium 140 133 - 144 mmol/L  59   Potassium 3.1 3.4 - 5.3 mmol/L L 59   Chloride 102 94 - 109 mmol/L  59   Carbon Dioxide 30 20 - 32 mmol/L  59   Anion Gap 8 3 - 14 mmol/L  59   Glucose 93 70 - 99 mg/dL  59   Urea Nitrogen 9 7 - 30 mg/dL  59   Creatinine 0.59 0.52 - 1.04 mg/dL  59   GFR Estimate >90 >60 mL/min/1.7m2  59   Comment:  Non  GFR Calc   GFR Estimate If Black >90 >60 mL/min/1.7m2  59   Comment:  African American GFR Calc   Calcium 8.5 8.5 - 10.1 mg/dL  59   Bilirubin Total 0.7 0.2 - 1.3 mg/dL  59   Albumin 2.6 3.4 - 5.0 g/dL L 59   Protein Total 6.0 6.8 - 8.8 g/dL L 59   Alkaline Phosphatase 78 40 - 150 U/L  59    0 - 50 U/L H 59    AST 35 0 - 45 U/L  59            Lipase [[id number]]  Resulted: 06/29/18 1403, Result status: Final result    Ordering provider: Abilio Staples MD  06/29/18 1241 Resulting lab: [facility]    Specimen Information    Type Source Collected On   Blood  06/29/18 1310          Components       Value Reference Range Flag Lab   Lipase 144 73 - 393 U/L  59            Troponin I [[id number]]  Resulted: 06/29/18 1403, Result status: Final result    Ordering provider: Abilio Staples MD  06/29/18 1241 Resulting lab: [facility]    Specimen Information    Type Source Collected On   Blood  06/29/18 1310          Components       Value Reference Range Flag Lab   Troponin I ES <0.015 0.000 - 0.045 ug/L  59   Comment:         The 99th percentile for upper reference range is 0.045 ug/L.  Troponin values   in the range of 0.045 - 0.120 ug/L may be associated with risks of adverse   clinical events.              INR [[id number]]  Resulted: 06/29/18 1345, Result status: Final result    Ordering provider: Abilio Staples MD  06/29/18 1241 Resulting lab: [facility]    Specimen Information    Type Source Collected On   Blood  06/29/18 1310          Components       Value Reference Range Flag Lab   INR 0.89 0.86 - 1.14  59            CBC with platelets differential [[id number]] (Abnormal)  Resulted: 06/29/18 1340, Result status: Final result    Ordering provider: Abilio Staples MD  06/29/18 1241 Resulting lab: [facility]    Specimen Information    Type Source Collected On   Blood  06/29/18 1310          Components       Value Reference Range Flag Lab   WBC 9.7 4.0 - 11.0 10e9/L  59   RBC Count 2.80 3.8 - 5.2 10e12/L L 59   Hemoglobin 8.3 11.7 - 15.7 g/dL L 59   Hematocrit 25.2 35.0 - 47.0 % L 59   MCV 90 78 - 100 fl  59   MCH 29.6 26.5 - 33.0 pg  59   MCHC 32.9 31.5 - 36.5 g/dL  59   RDW 14.6 10.0 - 15.0 %  59   Platelet Count 256 150 -  450 10e9/L  59   Diff Method Automated Method   59   % Neutrophils 72.5 %  59   % Lymphocytes 17.8 %  59   % Monocytes 7.5 %  59   % Eosinophils 0.2 %  59   % Basophils 0.3 %  59   % Immature Granulocytes 1.7 %  59   Nucleated RBCs 0 0 /100  59   Absolute Neutrophil 7.1 1.6 - 8.3 10e9/L  59   Absolute Lymphocytes 1.7 0.8 - 5.3 10e9/L  59   Absolute Monocytes 0.7 0.0 - 1.3 10e9/L  59   Absolute Eosinophils 0.0 0.0 - 0.7 10e9/L  59   Absolute Basophils 0.0 0.0 - 0.2 10e9/L  59   Abs Immature Granulocytes 0.2 0 - 0.4 10e9/L  59   Absolute Nucleated RBC 0.0   59            Lactic acid whole blood [[id number]] (Abnormal)  Resulted: 06/29/18 1332, Result status: Final result    Ordering provider: Abilio Staples MD  06/29/18 1241 Resulting lab: [facility]    Specimen Information    Type Source Collected On   Blood  06/29/18 1310          Components       Value Reference Range Flag Lab   Lactic Acid 2.2 0.7 - 2.0 mmol/L H 59   Comment:         Significant value called to and read back by  RADHA BARAJAS RN. 6/29/18 AT 1332 AS              Surgical pathology exam [[id number]]  Resulted: 06/29/18 1217, Result status: Final result    Ordering provider: Warren Mansfield MD  06/25/18 1856 Resulting lab: [facility]    Specimen Information    Type Source Collected On   Tissue Other 06/25/18 1856          Components       Value Reference Range Flag Lab   Copath Report --      Result:         Patient Name: VALERY PANDYA  MR#: [id number]  Specimen #: [id number]  Collected: 6/25/2018  Received: 6/25/2018  Reported: 6/29/2018 12:15  Ordering Phy(s): WARREN MANSFIELD    For improved result formatting, select 'View Enhanced Report Format' under   Linked Documents section.    SPECIMEN(S):  Right adrenal mass    FINAL DIAGNOSIS:  Right adrenal mass, adrenalectomy:  - Adrenal cortical carcinoma  - See synoptic report for details    COMMENT:  The tumor shows necrosis, invasion into the adrenal  capsule, less than 25%   clear cells and a mitotic rate of  15 per 50 HPF, thus satisfying the modified Adams criteria for adrenal   cortical carcinoma. The Ki67  proliferative index is 20% in the most proliferative areas.    Ref: Clinicopathological study of a series of 92 adrenocortical   carcinomas: from a proposal of simplified  diagnostic algorithm to prognostic stratification. Histopathology. 2009   Nov;55(5):535-43.    Report Name: Adrenal - Biopsy; Resection       Status: Sub mitted Checklist Inst: 1      Last Updated By: Jenna Cifuentes M.D., Rubi, 06/29/2018  12:10:30  Part(s) Involved:  A: Right adrenal mass    Synoptic Report:    SPECIMEN    Procedure:        - Adrenalectomy, total    Specimen Laterality:        - Right    TUMOR    Histologic Type:        - Adrenal cortical carcinoma    Histologic Grade:        - Low grade    Tumor Size: 11.3 Centimeters (cm)    Weight: 412.7 g    Tumor Extent      Tumor Extension:          - Tumor invades into or through the adrenal capsule    Accessory Findings      Lymphovascular Invasion:          - Not identified      Tumor Description:          - Necrotic    MARGINS    Margins:        - Involved by tumor    LYMPH NODES    Regional Lymph Nodes:        - No lymph nodes submitted or found    PATHOLOGIC STAGE CLASSIFICATION (PTNM, AJCC 8TH EDITION)    Primary Tumor (pT):        - pT2    Regional Lymph Nodes (pN):        - pNX    SPECIAL STUDIES    Ancillary Studies:        - Ki-67 mitotic rate - 20%    2017 J une AJCC 8th Edition CAP Annual Release    I have personally reviewed all specimens and/or slides, including the   listed special stains, and used them  with my medical judgement to determine or confirm the final diagnosis.    Electronically signed out by:    Jenna Cifuentes M.D., Rubi    CLINICAL HISTORY:  32 year old female with a right adrenal mass.  GROSS:  The specimen is received in formalin with proper patient identification,   labeled  "\"right adrenal mass (final)\".   The specimen consists of an unoriented intact 412.7 g adrenal gland with   attached fibroadipose (12.7 x 8.3 x  6.7 cm).  The capsule is remarkable for a tan-white, firm puckered area   (2.7 x 2.4cm, inked blue).  The  remaining capsule is tan-brown, soft, unremarkable, and inked black.    Sectioning reveals a tan yellow grossly  unremarkable adrenal gland underlying the blue ink.  Cut section reveals   an encapsulated, variegated pink to  yellow, soft mass (11.3 x 8.3 by 6.5cm) with focal areas of necrosis    arising from the cortex.  The grossly  uninvolved adrenal gland has an average cortical thickness of 0.1 cm and   an average medullary thickness of 0.1  cm.  Representative sections are submitted as per summary of sections.    Summary of Sections:  A1-A3 - mass with grossly uninvolved adrenal  A4-A5  mass underlying blue ink, representative  A6-A12  mass, representative  A13  Mass with uninvolved adrenal (Dictated by: Danish Bishop 6/26/2018   05:02 PM)    MICROSCOPIC:  Microscopic examination was performed.    The tumor is positive for calretinin, melan A and synaptophysin.    CPT Codes:  A: 78666-TW6, 76120-GOT, 50695-JVP, 88167-CRE, 48247-EUQ    TESTING LAB LOCATION:  [address]   [address]  [phone]    COLLECTION SITE:  Client: [facility]  Location: [facility] (B)                Basic metabolic panel [[id number]] (Abnormal)  Resulted: 06/28/18 0811, Result status: Final result    Ordering provider: Steve Chiu MD  06/27/18 2330 Resulting lab: [facility]    Specimen Information    Type Source Collected On   Blood  06/28/18 9725          Components       Value Reference Range Flag Lab   Sodium 142 133 - 144 mmol/L  51   Potassium 3.3 3.4 - 5.3 mmol/L L 51   Chloride 103 94 - 109 mmol/L  51   Carbon " Dioxide 30 20 - 32 mmol/L  51   Anion Gap 8 3 - 14 mmol/L  51   Glucose 83 70 - 99 mg/dL  51   Urea Nitrogen 5 7 - 30 mg/dL L 51   Creatinine 0.52 0.52 - 1.04 mg/dL  51   GFR Estimate >90 >60 mL/min/1.7m2  51   Comment:  Non  GFR Calc   GFR Estimate If Black >90 >60 mL/min/1.7m2  51   Comment:  African American GFR Calc   Calcium 8.4 8.5 - 10.1 mg/dL L 51            Hepatic panel [[id number]] (Abnormal)  Resulted: 06/28/18 0811, Result status: Final result    Ordering provider: Ellen Jacinto APRN CNP  06/27/18 2330 Resulting lab: [facility]    Specimen Information    Type Source Collected On   Blood  06/28/18 0734          Components       Value Reference Range Flag Lab   Bilirubin Direct <0.1 0.0 - 0.2 mg/dL  51   Bilirubin Total 0.4 0.2 - 1.3 mg/dL  51   Albumin 2.7 3.4 - 5.0 g/dL L 51   Protein Total 5.8 6.8 - 8.8 g/dL L 51   Alkaline Phosphatase 64 40 - 150 U/L  51    0 - 50 U/L H 51   AST 58 0 - 45 U/L H 51            CBC with platelets [[id number]] (Abnormal)  Resulted: 06/28/18 0747, Result status: Final result    Ordering provider: Steve Chiu MD  06/27/18 2330 Resulting lab: [facility]    Specimen Information    Type Source Collected On   Blood  06/28/18 0734          Components       Value Reference Range Flag Lab   WBC 12.9 4.0 - 11.0 10e9/L H 51   RBC Count 2.84 3.8 - 5.2 10e12/L L 51   Hemoglobin 8.5 11.7 - 15.7 g/dL L 51   Hematocrit 25.5 35.0 - 47.0 % L 51   MCV 90 78 - 100 fl  51   MCH 29.9 26.5 - 33.0 pg  51   MCHC 33.3 31.5 - 36.5 g/dL  51   RDW 15.5 10.0 - 15.0 % H 51   Platelet Count 204 150 - 450 10e9/L  51            Hemoglobin [[id number]] (Abnormal)  Resulted: 06/27/18 1715, Result status: Final result    Ordering provider: Steve Chiu MD  06/27/18 0529 Resulting lab: [facility]    Specimen Information    Type Source Collected On   Blood  06/27/18  1616          Components       Value Reference Range Flag Lab   Hemoglobin 8.3 11.7 - 15.7 g/dL L 51            Hemoglobin [[id number]] (Abnormal)  Resulted: 06/27/18 1632, Result status: Final result    Ordering provider: Warren Mansfield MD  06/27/18 1616 Resulting lab: [facility]    Specimen Information    Type Source Collected On     06/27/18 1616          Components       Value Reference Range Flag Lab   Hemoglobin 8.2 11.7 - 15.7 g/dL L 51            Hepatic panel [[id number]] (Abnormal)  Resulted: 06/27/18 1053, Result status: Final result    Ordering provider: Steve Chiu MD  06/27/18 0517 Resulting lab: [facility]    Specimen Information    Type Source Collected On     06/27/18 0517          Components       Value Reference Range Flag Lab   Bilirubin Direct <0.1 0.0 - 0.2 mg/dL  51   Bilirubin Total 0.3 0.2 - 1.3 mg/dL  51   Albumin 2.4 3.4 - 5.0 g/dL L 51   Protein Total 4.6 6.8 - 8.8 g/dL L 51   Alkaline Phosphatase 50 40 - 150 U/L  51    0 - 50 U/L H 51    0 - 45 U/L H 51            Basic metabolic panel [[id number]] (Abnormal)  Resulted: 06/27/18 0600, Result status: Final result    Ordering provider: Steve Chiu MD  06/26/18 2330 Resulting lab: [facility]    Specimen Information    Type Source Collected On   Blood  06/27/18 0517          Components       Value Reference Range Flag Lab   Sodium 141 133 - 144 mmol/L  51   Potassium 3.5 3.4 - 5.3 mmol/L  51   Chloride 106 94 - 109 mmol/L  51   Carbon Dioxide 28 20 - 32 mmol/L  51   Anion Gap 7 3 - 14 mmol/L  51   Glucose 88 70 - 99 mg/dL  51   Urea Nitrogen 10 7 - 30 mg/dL  51   Creatinine 0.68 0.52 - 1.04 mg/dL  51   GFR Estimate >90 >60 mL/min/1.7m2  51   Comment:  Non  GFR Calc   GFR Estimate If Black >90 >60 mL/min/1.7m2  51   Comment:  African American GFR Calc   Calcium 7.6 8.5 - 10.1 mg/dL L  "51            CBC with platelets [[id number]] (Abnormal)  Resulted: 06/27/18 0548, Result status: Final result    Ordering provider: Steve Chiu MD  06/26/18 2330 Resulting lab: [facility]    Specimen Information    Type Source Collected On   Blood  06/27/18 0517          Components       Value Reference Range Flag Lab   WBC 11.5 4.0 - 11.0 10e9/L H 51   RBC Count 2.28 3.8 - 5.2 10e12/L L 51   Hemoglobin 6.8 11.7 - 15.7 g/dL LL 51   Comment:         This result has been called to KAMERON LERMA by Madonna Mustafa on 06 27 2018 at 0546, and has been read back.      Hematocrit 20.6 35.0 - 47.0 % L 51   MCV 90 78 - 100 fl  51   MCH 29.8 26.5 - 33.0 pg  51   MCHC 33.0 31.5 - 36.5 g/dL  51   RDW 15.4 10.0 - 15.0 % H 51   Platelet Count 170 150 - 450 10e9/L  51            Testing Performed By     Lab - Abbreviation Name Director Address Valid Date Range    51 - Unknown [facility] Unknown [address] [facility] [address] 12/31/14 1010 - Present    59 - Unknown [facility] Unknown [address] [facility] [address] 12/31/14 1006 - Present    75 - Unknown [facility] Unknown [address] [facility] [address] 01/15/15 1019 - Present    88 - Unknown [facility] Unknown Unknown 10/30/02 0000 - Present    225 - Unknown [facility] Unknown [address] [facility] [address] 12/19/14 0954 - Present            Unresulted Labs (24h ago through future)    Start       Ordered    Unscheduled  Potassium  (Potassium Replacement - \"Standard\" - For K levels less than 3.4 mmol/L - UU,UR,UA,RH,SH,PH,W[place] )  CONDITIONAL (SPECIFY),   Routine     Comments:  Obtain Potassium Level for these conditions:  *IF no potassium result within 24 hours before initiation of order set, draw potassium level with next lab collect.    *2 HOURS AFTER last IV potassium replacement dose and 4 hours " "after an oral replacement dose.  *Next morning after potassium dose.     Repeat Potassium Replacement if necessary.    06/29/18 1846    Unscheduled  Potassium  (Potassium Replacement - \"Standard\" - For K levels less than 3.4 mmol/L - UU,UR,UA,RH,SH,PH,WY )  CONDITIONAL (SPECIFY),   Routine     Comments:  Obtain Potassium Level for these conditions:  *IF no potassium result within 24 hours before initiation of order set, draw potassium level with next lab collect.    *2 HOURS AFTER last IV potassium replacement dose and 4 hours after an oral replacement dose.  *Next morning after potassium dose.     Repeat Potassium Replacement if necessary.    06/29/18 2100         Imaging Results - 3 Days      CT Chest/Abdomen/Pelvis w Contrast [[id number]]  Resulted: 06/29/18 1554, Result status: Final result    Ordering provider: Abilio Staples MD  06/29/18 1258 Resulted by: Skyler Gillis MD    Performed: 06/29/18 1428 - 06/29/18 1500 Resulting lab: RADIOLOGY RESULTS    Narrative:       CT CHEST/ABDOMEN/PELVIS WITH CONTRAST  6/29/2018 3:00 PM    HISTORY: Chest pain/abdominal pain with recent right adrenalectomy  with possible diaphragm injury. Rule out pulmonary embolism,  pneumothorax, abdomen/diaphragm hematoma.    TECHNIQUE: CT scan obtained of the chest, abdomen, and pelvis with  oral and IV contrast. 74mL Isovue-370 injected. Radiation dose for  this scan was reduced using automated exposure control, adjustment of  the mA and/or kV according to patient size, or iterative  reconstruction technique.    COMPARISON:  CT chest 6/14/2018. MRI abdomen 5/9/2018.    FINDINGS:  Chest: No acute thoracic aortic abnormality. No evidence for pulmonary  embolism. No enlarged lymph nodes identified. Small right pleural  effusion newly identified. New finding of small right pneumothorax.  Small bubble of gas at the inferior pericardial fat and anterior to  the midline esophagus series 4 image 88. There is moderate right " lower  lobe base atelectasis. Left lung appears clear of any acute  abnormality. Previously noted lingular pulmonary nodule is difficult  to visualize currently. Previously noted small left lower lobe nodule  is also very difficult to correlate on the current examination.    Abdomen/pelvis: The right adrenal mass has been resected. There are  scattered bubbles of extraluminal gas at the adrenalectomy site and at  other locations at the upper abdomen. There is a somewhat ill-defined  lobulated an irregular appearance of the posterior right hemidiaphragm  on series 9 image 15 that is immediately superior to the operative  region. Also see coronal series 10 image 98. There is a locule of  fluid at the adrenalectomy site measuring 6.4 cm image 24. There is  small ascites adjacent to the right and posterior liver. A more  prominent collection of gas and some internal fluid versus other  postoperative material lying immediately posterior to the right liver  is 6.2 x 3.3 cm series 9 image 40.    A nodular lesion measuring 2.8 cm is stable at the inferior right  liver with peripheral nodular enhancement series 9 image 50. The prior  MRI imaging suggests a potential hemangioma. No acute hepatic  abnormality. Gallbladder, left adrenal, spleen, pancreas, and kidneys  show no acute abnormalities otherwise. Nonobstructing stone at the  right kidney. 0.2 cm series 9 image 35. No hydronephrosis.    No acute bowel abnormality. No bowel obstruction is seen. Prominent  stool at the proximal colon. Pelvis shows no fluid collections. No  convincing adenopathy.      Impression:       IMPRESSION:  1. Right adrenalectomy. There is subtle irregularity along the  posterior right inferior hemidiaphragm near the surgical site that is  at least moderately suspicious for potential focal diaphragmatic  injury in this area. There is small right pneumothorax and small gas  at the most inferior mediastinum that may relate to this finding.  2. Focal  loculated fluid and collections of extraluminal gas at the  adrenalectomy site noted as above. Small ascites also noted.  3. Small right pleural effusion. Moderate right lung base atelectasis.  4. No other acute abnormality is identified. No pulmonary embolism or  acute abnormality.  5. Previously noted small pulmonary nodules are difficult to visualize  on this examination, but surveillance imaging should be considered on  the basis of the prior CT.  6. Small nonobstructing stone within the right kidney.    VALENTINE OLIVEIRA MD      Testing Performed By     Lab - Abbreviation Name Director Address Valid Date Range    104 - Rad [facility] RADIOLOGY RESULTS Unknown Unknown 02/16/05 1553 - Present            Encounter-Level Documents:     There are no encounter-level documents.      Order-Level Documents:     There are no order-level documents.

## 2018-06-29 NOTE — ED NOTES
Pt here with right-sided chest pain that started last night, hurts more with breathing. Pt had surgery on Monday to remove a tumor from her adrenal gland, found out that it is cancerous this afternoon. Per pt, the surgeon told her that he nicked her diaphragm during surgery. She is concerned that she has a blood clot. Needed a blood transfusion on Wednesday after surgery.

## 2018-06-29 NOTE — IP AVS SNAPSHOT
` ` Patient Information     Patient Name Sex     Suzy Rodríguez ([id number]) Female 1985       Room Bed    2215      Patient Demographics     Address Phone E-mail Address    [address] [place] [address] [phone] (Home)  [phone] (Mobile) [email or web address]      Patient Ethnicity & Race     Ethnic Group Patient Race    American White      Emergency Contact(s)     Name Relation Home Work Mobile    Ashvin Rodríguez Spouse [phone]    Rhiannon Sood Mother [phone]        Documents on File        Status Date Received Description       Documents for the Patient    Insurance Card  06     Face Sheet  06     Privacy Notice - Leopold Received 18     Affiliate Privacy placeholder   phase3    Consent for EHR Access Received 18     External Medication Information Consent       Patient ID Received 18     [facility] Specified Other       Consent for Services/Privacy Notice - Hospital/Clinic       Care Everywhere Prospective Auth       Consent for Services - [facility]       Consent for Services - Hospital and Clinic Received 18     HIE Auth Received 18     Patient Entered Drawing       Insurance Card Received 18 P oNe    Business/Insurance/Care Coordination/Health Form - Patient  18 PREFERRED ONE APPROVAL OF  INPATIENT STAY    Patient Photo   Photo of Patient       Documents for the Encounter    CMS IM for Patient Signature       Observation Notice Received 18     ECG   ECG Report      Admission Information     Attending Provider Admitting Provider Admission Type Admission Date/Time    Jorge Silverman MD Khan, Shams, MD Emergency 18  1229    Discharge Date Hospital Service Auth/Cert Status [facility]    Unit Room/Bed Admission Status       [facility]  Admission (Confirmed)       Admission     Complaint    Acute post-operative pain, Chest  pain      Hospital Account     Name Acct ID Class Status Primary Coverage    MarcosSuzy [id number] Observation Open [employer] - [employer]            Guarantor Account (for Hospital Account #[id number])     Name Relation to Pt Service Area Active? Acct Type    Suzy Rodríguez  FCS Yes Personal/Family    Address Phone          [address] [address] [phone](H)              Coverage Information (for Hospital Account #[id number])     F/O Payor/Plan Precert #    PRE[employer]EDONE/[employer]VANTAGE     Subscriber Subscriber #    Ashvin Rodríguez [id number]    Address Phone    [address]  [place] [address] [phone]

## 2018-06-29 NOTE — PHARMACY-VANCOMYCIN DOSING SERVICE
Pharmacy Vancomycin Initial Note  Date of Service 2018  Patient's  1985  32 year old, female    Indication: Sepsis    Current estimated CrCl = Estimated Creatinine Clearance: 136.2 mL/min (based on Cr of 0.59).    Creatinine for last 3 days  2018:  5:17 AM Creatinine 0.68 mg/dL  2018:  7:34 AM Creatinine 0.52 mg/dL  2018:  1:10 PM Creatinine 0.59 mg/dL    Recent Vancomycin Level(s) for last 3 days  No results found for requested labs within last 72 hours.      Vancomycin IV Administrations (past 72 hours)      No vancomycin orders with administrations in past 72 hours.                Nephrotoxins and other renal medications (Future)    Start     Dose/Rate Route Frequency Ordered Stop    18 1706  vancomycin (VANCOCIN) 2,000 mg in sodium chloride 0.9 % 500 mL intermittent infusion      2,000 mg  over 2 Hours Intravenous ONCE 18 1705      18 1651  piperacillin-tazobactam (ZOSYN) intermittent infusion 4.5 g      4.5 g  200 mL/hr over 30 Minutes Intravenous ONCE 18 1650      18 0530  vancomycin (VANCOCIN) 1,500 mg in sodium chloride 0.9 % 250 mL intermittent infusion      1,500 mg  over 90 Minutes Intravenous EVERY 12 HOURS 18 1705            Contrast Orders - past 72 hours (72h ago through future)    Start     Dose/Rate Route Frequency Ordered Stop    18 1306  iopamidol (ISOVUE-370) solution 74 mL      74 mL Intravenous ONCE 18 1305 18 1435                Plan:  1.  Start vancomycin  2000 mg x 1 now; then decrease to 1500 mg IV q12h.   2.  Goal Trough Level: 15-20 mg/L   3.  Pharmacy will check trough levels as appropriate in 1-3 Days.    4. Serum creatinine levels will be ordered daily for the first week of therapy and at least twice weekly for subsequent weeks.    5. [place] method utilized to dose vancomycin therapy: Method 2    Cris Solis, SánchezD

## 2018-06-29 NOTE — IP AVS SNAPSHOT
` `     [facility] SURGICAL: [phone]            Medication Administration Report for Suzy Rodríguez as of 06/30/18 1427   Legend:    Given Hold Not Given Due Canceled Entry Other Actions    Time Time (Time) Time  Time-Action       Inactive    Active    Linked        Medications 06/24/18 06/25/18 06/26/18 06/27/18 06/28/18 06/29/18 06/30/18    acetaminophen (TYLENOL) Suppository 650 mg  Dose: 650 mg  Freq: EVERY 4 HOURS PRN Route: RE  PRN Reason: mild pain  Start: 06/29/18 1845   Admin Instructions: Alternate ibuprofen (if ordered) with acetaminophen.  Maximum acetaminophen dose from all sources = 75 mg/kg/day not to exceed 4 grams/day.    Admin. Amount: 1 suppository (1 × 650 mg suppository)  Dispense Loc: [facility]               acetaminophen (TYLENOL) tablet 650 mg  Dose: 650 mg  Freq: EVERY 4 HOURS PRN Route: PO  PRN Reason: other  PRN Comment: multimodal surgical pain management along with NSAIDS and opioid medication as indicated based on pain control and physical function.  Start: 06/29/18 1845   Admin Instructions: Maximum acetaminophen dose from all sources = 75 mg/kg/day not to exceed 4 grams/day.    Admin. Amount: 2 tablet (2 × 325 mg tablet)  Last Admin: 06/30/18 0353  Dispense Loc: [facility]           0353 (650 mg)-Given           buPROPion (WELLBUTRIN) tablet 100 mg  Dose: 100 mg  Freq: 2 TIMES DAILY Route: PO  Start: 06/29/18 2000   Admin. Amount: 1 tablet (1 × 100 mg tablet)  Last Admin: 06/30/18 1000  Dispense Loc: [facility]          2016 (100 mg)-Given        1000 (100 mg)-Given       [ ] 2000           enoxaparin (LOVENOX) injection 40 mg  Dose: 40 mg  Freq: DAILY Route: SC  Start: 06/29/18 1915   Admin. Amount: 40 mg = 0.4 mL Conc: 40 mg/0.4 mL  Last Admin: 06/29/18 2016  Dispense Loc: [facility]  Volume: 0.4 mL          2016 (40 mg)-Given        [ ] 1900           FLUoxetine (PROzac) capsule 20 mg  Dose: 20 mg  Freq: EVERY MORNING Route: PO  Start:  06/30/18 0800   Admin. Amount: 1 capsule (1 × 20 mg capsule)  Last Admin: 06/30/18 0959  Dispense Loc: [facility]           0959 (20 mg)-Given           hydrocortisone (CORTEF) tablet 10 mg  Dose: 10 mg  Freq: DAILY Route: PO  Start: 06/30/18 1400   Admin. Amount: 1 tablet (1 × 10 mg tablet)  Last Admin: 06/30/18 1409  Dispense Loc: [facility]           1409 (10 mg)-Given           hydrocortisone (CORTEF) tablet 20 mg  Dose: 20 mg  Freq: EVERY MORNING Route: PO  Start: 06/30/18 0915   Admin. Amount: 2 tablet (2 × 10 mg tablet)  Last Admin: 06/30/18 1002  Dispense Loc: [facility]           1002 (20 mg)-Given           HYDROmorphone (PF) (DILAUDID) injection 0.3-0.5 mg  Dose: 0.3-0.5 mg  Freq: EVERY 2 HOURS PRN Route: IV  PRN Reason: other  PRN Comment: for pain control or improvement in physical function.  Hold dose for analgesic side effects.  Start: 06/29/18 1845   Admin Instructions: Start at the lowest dose.  May adjust dose by 0.1 mg every 2 hours as needed.  Notify provider to assess for uncontrolled pain or analgesic side effects. Hold while on PCA or with regular IV opioid dosing  For ordered doses up to 4 mg give IV Push undiluted. Administer each 2mg over 2-5 minutes.    Admin. Amount: 0.3-0.5 mg  Last Admin: 06/29/18 2143  Dispense Loc: [facility]   Current Line: Peripheral IV 06/29/18 Left Upper forearm         2143 (0.5 mg)-Given            lactated ringers infusion  Rate: 75 mL/hr   Freq: CONTINUOUS Route: IV  Start: 06/29/18 1900   Last Admin: 06/29/18 2016  Dispense Loc: [facility]  Volume: 1,000 mL   Current Line: Peripheral IV 06/29/18 Left Upper forearm         2016 ( )-New Bag              Rate: 125 mL/hr Dose: 1000 mL  Freq: CONTINUOUS Route: IV  Start: 06/29/18 1242   End: 06/30/18 1331   Admin Instructions: Administer after the bolus.    Admin. Amount: 1,000 mL  Dispense Loc: [facility]  Volume: 1,000 mL                  1331-Med Discontinued       levofloxacin  (LEVAQUIN) infusion 500 mg  Dose: 500 mg  Freq: EVERY 24 HOURS Route: IV  Indications of Use: COMMUNITY ACQUIRED PNEUMONIA  Last Dose: 500 mg (06/29/18 2018)  Start: 06/29/18 2000   Admin Instructions: Irritant. Administer at a rate of no greater than 100 mL/hr    Admin. Amount: 500 mg = 100 mL Conc: 500 mg/100 mL  Last Admin: 06/29/18 2018  Dispense Loc: [place]  Infused Over: 60 Minutes   Current Line: Peripheral IV 06/29/18 Left Upper forearm         2018 (500 mg)-New Bag        [ ] 2000           Lidocaine (LIDOCARE) 4 % Patch 2 patch  Dose: 2 patch  Freq: EVERY 24 HOURS Route: TD  Start: 06/29/18 1900   Admin Instructions: Apply patch(s) to skin. To prevent lidocaine toxicity, patient should be patch free for 12 hrs daily. Patches may be cut to smaller size prior to removing release liner.  NEVER APPLY HEAT OVER PATCH which increases absorption and risk of local anesthetic toxicity. Do not apply over area where liposomal bupivacaine was injected for 96 hours post injection.    Admin. Amount: 2 patch  Last Admin: 06/30/18 1240  Dispense Loc: [facility]  Infused Over: 12 Hours          (2017)-Not Given [C]        1240 (2 patch)-Patch in Place           lidocaine patch in PLACE  Freq: EVERY 8 HOURS Route: TD  Start: 06/29/18 1915   Admin Instructions: Chart every shift, confirming that patch is still in place on patient (no barcode scan needed). See patch order for dose information.  NEVER APPLY HEAT OVER PATCH which will increase absorption and may lead to risk of local anesthetic toxicity. Do not apply over area where liposomal bupivacaine injected for 96 hours.    Last Admin: 06/30/18 1241  Dispense Loc: [place]          2018 ( )-Patch in Place               1241 ( )-Patch in Place       [ ] 1915           lidocaine patch REMOVAL  Freq: EVERY 24 HOURS 0800 Route: TD  Start: 06/30/18 0800   Admin Instructions: Remove lidocaine Patch.    Dispense Loc: [place]            (1002)-Not Given [C]           LORazepam (ATIVAN) tablet 0.5 mg  Dose: 0.5 mg  Freq: EVERY 4 HOURS PRN Route: PO  PRN Reason: anxiety  Start: 06/29/18 1918   Admin Instructions: Patient was taking q8h prn at home    Admin. Amount: 1 tablet (1 × 0.5 mg tablet)  Last Admin: 06/30/18 1409  Dispense Loc: [email or web address]          2311 (0.5 mg)-Given        0356 (0.5 mg)-Given       0849 (0.5 mg)-Given       1409 (0.5 mg)-Given           melatonin tablet 1 mg  Dose: 1 mg  Freq: AT BEDTIME PRN Route: PO  PRN Reason: sleep  Start: 06/29/18 1845   Admin Instructions: Do not give unless at least 6 hours of uninterrupted sleep is expected.    Admin. Amount: 1 tablet (1 × 1 mg tablet)  Dispense Loc: [facility]               naloxone (NARCAN) injection 0.1-0.4 mg  Dose: 0.1-0.4 mg  Freq: EVERY 2 MIN PRN Route: IV  PRN Reason: opioid reversal  Start: 06/29/18 1845   Admin Instructions: For respiratory rate LESS than or EQUAL to 8.  Partial reversal dose:  0.1 mg titrated q 2 minutes for Analgesia Side Effects Monitoring Sedation Level of 3 (frequently drowsy, arousable, drifts to sleep during conversation).Full reversal dose:  0.4 mg bolus for Analgesia Side Effects Monitoring Sedation Level of 4 (somnolent, minimal or no response to stimulation).  For ordered doses up to 2mg give IVP. Give each 0.4mg over 15 seconds in emergency situations. For non-emergent situations further dilute in 9mL of NS to facilitate titration of response.    Admin. Amount: 0.1-0.4 mg = 0.25-1 mL Conc: 0.4 mg/mL  Dispense Loc: [email or web address]  Volume: 1 mL               ondansetron (ZOFRAN-ODT) ODT tab 4 mg  Dose: 4 mg  Freq: EVERY 6 HOURS PRN Route: PO  PRN Reasons: nausea,vomiting  Start: 06/29/18 1846   Admin Instructions: This is Step 1 of nausea and vomiting management.  If nausea not resolved in 15 minutes, go to Step 2 prochlorperazine (COMPAZINE). Do not push through foil backing. Peel back foil and gently remove. Place on tongue  immediately. Administration with liquid unnecessary  With dry hands, peel back foil backing and gently remove tablet; do not push oral disintegrating tablet through foil backing; administer immediately on tongue and oral disintegrating tablet dissolves in seconds; then swallow with saliva; liquid not required.    Admin. Amount: 1 tablet (1 × 4 mg tablet)  Dispense Loc: [facility]              Or  ondansetron (ZOFRAN) injection 4 mg  Dose: 4 mg  Freq: EVERY 6 HOURS PRN Route: IV  PRN Reasons: nausea,vomiting  Start: 06/29/18 1846   Admin Instructions: This is Step 1 of nausea and vomiting management.  If nausea not resolved in 15 minutes, go to Step 2 prochlorperazine (COMPAZINE).  Irritant. For ordered doses up to 4 mg, give IV Push undiluted over 2-5 minutes.    Admin. Amount: 4 mg = 2 mL Conc: 4 mg/2 mL  Dispense Loc: [facility]  Infused Over: 2-5 Minutes  Volume: 2 mL               oxyCODONE IR (ROXICODONE) tablet 5-10 mg  Dose: 5-10 mg  Freq: EVERY 4 HOURS PRN Route: PO  PRN Reason: moderate to severe pain  Start: 06/29/18 1846   Admin. Amount: 1-2 tablet (1-2 × 5 mg tablet)  Last Admin: 06/30/18 1230  Dispense Loc: [facility]          2029 (10 mg)-Given        0027 (10 mg)-Given       0353 (10 mg)-Given       0849 (10 mg)-Given       1230 (10 mg)-Given           piperacillin-tazobactam (ZOSYN) infusion 3.375 g  Dose: 3.375 g  Freq: EVERY 6 HOURS Route: IV  Indications of Use: COMMUNITY ACQUIRED PNEUMONIA  Last Dose: 3.375 g (06/30/18 1231)  Start: 06/29/18 2300   Admin. Amount: 3.375 g = 50 mL Conc: 3.375 g/50 mL  Last Admin: 06/30/18 1231  Dispense Loc: [facility]  Infused Over: 30 Minutes  Volume: 50 mL   Current Line: Peripheral IV 06/29/18 Left Upper forearm          0028 (3.375 g)-New Bag       0649 (3.375 g)-New Bag       1231 (3.375 g)-New Bag       [ ] 1700       [ ] 2300           polyethylene glycol (MIRALAX/GLYCOLAX) Packet 17 g  Dose: 17 g  Freq: DAILY Route: PO  Start:  06/29/18 1900   Admin Instructions: Give in 8oz of  water, juice, or soda. Hold for loose stools.  This is the second step of a three step constipation treatment.  1 Packet = 17 grams. Mixed prescribed dose in 8 ounces of water. Follow with 8 oz. of water.    Admin. Amount: 17 g  Last Admin: 06/30/18 1005  Dispense Loc: [facility]          2016 (17 g)-Given        1005 (17 g)-Given           potassium chloride (KLOR-CON) Packet 20-40 mEq  Dose: 20-40 mEq  Freq: EVERY 2 HOURS PRN Route: ORAL OR FEED  PRN Reason: potassium supplementation  Start: 06/29/18 2100   Admin Instructions: Use if unable to tolerate tablets.  If Serum K+ 3.0-3.3, dose = 60 mEq po total dose (40 mEq x1 followed in 2 hours by 20 mEq x1). Recheck K+ level 4 hours after dose and the next AM.  If Serum K+ 2.5-2.9, dose = 80 mEq po total dose (40 mEq Q2H x2). Recheck K+ level 4 hours after dose and the next AM.  If Serum K+ less than 2.5, See IV order.  Dissolve packet contents in 4-8 ounces of cold water or juice.    Admin. Amount: 20-40 mEq  Dispense Loc: [facility]               potassium chloride 10 mEq in 100 mL intermittent infusion with 10 mg lidocaine  Dose: 10 mEq  Freq: EVERY 1 HOUR PRN Route: IV  PRN Reason: potassium supplementation  Start: 06/29/18 1846   Admin Instructions: Infuse via PERIPHERAL LINE. Use potassium with lidocaine for pain with peripheral administration.  If Serum K+ 3.0-3.3, dose = 10 mEq/hr x4 doses (40 mEq IV total dose). Recheck K+ level 2 hours after dose and the next AM.  If Serum K+ less than 3.0, dose = 10 mEq/hr x6 doses (60 mEq IV total dose). Recheck K+ level 2 hours after dose and the next AM.    Admin. Amount: 10 mEq = 100 mL Conc: 10 mEq/100 mL  Dispense Loc: [facility]  Infused Over: 1 Hours  Volume: 100 mL               potassium chloride 10 mEq in 100 mL sterile water intermittent infusion (premix)  Dose: 10 mEq  Freq: EVERY 1 HOUR PRN Route: IV  PRN Reason: potassium  supplementation  Start: 06/29/18 2100   Admin Instructions: Infuse via PERIPHERAL LINE or CENTRAL LINE. Use for central line replacement if patient weight less than 65 kg, if patient is on TPN with high potassium content or if unit does not stock 20 mEq bags.   If Serum K+ 3.0-3.3, dose = 10 mEq/hr x4 doses (40 mEq IV total dose). Recheck K+ level 2 hours after dose and the next AM.   If Serum K+ less than 3.0, dose = 10 mEq/hr x6 doses (60 mEq IV total dose). Recheck K+ level 2 hours after dose and the next AM.    Admin. Amount: 10 mEq = 100 mL Conc: 10 mEq/100 mL  Dispense Loc: [facility]  Infused Over: 60 Minutes  Volume: 100 mL               potassium chloride 20 mEq in 50 mL intermittent infusion  Dose: 20 mEq  Freq: EVERY 1 HOUR PRN Route: IV  PRN Reason: potassium supplementation  Start: 06/29/18 2100   Admin Instructions: Infuse via CENTRAL LINE Only. May need EKG if less than 65 kg or on TPN - Max rate is 0.3 mEq/kg/hr for patients not on EKG monitoring.   If Serum K+ 3.0-3.3, dose = 20 mEq/hr x2 doses (40 mEq IV total dose). Recheck K+ level 2 hours after dose and the next AM.  If Serum K+ less than 3.0, dose = 20 mEq/hr x3 doses (60 mEq IV total dose). Recheck K+ level 2 hours after dose and the next AM.    Admin. Amount: 20 mEq = 50 mL Conc: 20 mEq/50 mL  Dispense Loc: [facility]  Volume: 50 mL               potassium chloride SA (K-DUR/KLOR-CON M) CR tablet 20-40 mEq  Dose: 20-40 mEq  Freq: EVERY 2 HOURS PRN Route: PO  PRN Reason: potassium supplementation  Start: 06/29/18 2100   Admin Instructions: Use if able to take PO.   If Serum K+ 3.0-3.3, dose = 60 mEq po total dose (40 mEq x1 followed in 2 hours by 20 mEq x1). Recheck K+ level 4 hours after dose and the next AM.  If Serum K+ 2.5-2.9, dose = 80 mEq po total dose (40 mEq Q2H x2). Recheck K+ level 4 hours after dose and the next AM.  If Serum K+ less than 2.5, See IV order.  DO NOT CRUSH    Admin. Amount: 1-2 tablet (1-2 × 20 mEq  tablet)  Last Admin: 06/29/18 2312  Dispense Loc: [facility]          2117 (40 mEq)-Given       2312 (20 mEq)-Given            prochlorperazine (COMPAZINE) injection 10 mg  Dose: 10 mg  Freq: EVERY 6 HOURS PRN Route: IV  PRN Reasons: nausea,vomiting  Start: 06/29/18 1846   Admin Instructions: This is Step 2 of nausea and vomiting management. Give if nausea not resolved 15 minutes after giving ondansetron (ZOFRAN).  If nausea not resolved in 15 minutes, go to Step 3 metoclopramide (REGLAN), if ordered.  For ordered doses up to 10 mg, give IV Push undiluted. Each 5mg over 1 minute.    Admin. Amount: 10 mg = 2 mL Conc: 5 mg/mL  Dispense Loc: [facility]  Infused Over: 1-2 Minutes  Volume: 2 mL              Or  prochlorperazine (COMPAZINE) tablet 10 mg  Dose: 10 mg  Freq: EVERY 6 HOURS PRN Route: PO  PRN Reason: vomiting  Start: 06/29/18 1846   Admin Instructions: This is Step 2 of nausea and vomiting management. Give if nausea not resolved 15 minutes after giving ondansetron (ZOFRAN).  If nausea not resolved in 15 minutes, go to Step 3 metoclopramide (REGLAN), if ordered.    Admin. Amount: 1 tablet (1 × 10 mg tablet)  Dispense Loc: [facility]              Or  prochlorperazine (COMPAZINE) Suppository 25 mg  Dose: 25 mg  Freq: EVERY 12 HOURS PRN Route: RE  PRN Reasons: nausea,vomiting  Start: 06/29/18 1846   Admin Instructions: This is Step 2 of nausea and vomiting management. Give if nausea not resolved 15 minutes after giving ondansetron (ZOFRAN).  If nausea not resolved in 15 minutes, go to Step 3 metoclopramide (REGLAN), if ordered.    Admin. Amount: 1 suppository (1 × 25 mg suppository)  Dispense Loc: [facility]               senna-docusate (SENOKOT-S;PERICOLACE) 8.6-50 MG per tablet 1 tablet  Dose: 1 tablet  Freq: 2 TIMES DAILY PRN Route: PO  PRN Reason: constipation  Start: 06/29/18 1846   Admin Instructions: If no bowel movement in 24 hours, increase to 2 tablets PO.  Hold for loose  stools.  This is the first step of a three step constipation treatment.    Admin. Amount: 1 tablet  Dispense Loc: [facility]              Or  senna-docusate (SENOKOT-S;PERICOLACE) 8.6-50 MG per tablet 2 tablet  Dose: 2 tablet  Freq: 2 TIMES DAILY PRN Route: PO  PRN Reason: constipation  Start: 18 1846   Admin Instructions: Hold for loose stools.  This is the first step of a three step constipation treatment.    Admin. Amount: 2 tablet  Dispense Loc: [facility]               vancomycin (VANCOCIN) 1,500 mg in sodium chloride 0.9 % 250 mL intermittent infusion  Dose: 1,500 mg  Freq: EVERY 12 HOURS Route: IV  Indications of Use: SEPSIS  Start: 18 1000   Admin Instructions: Vesicant.    For peripheral catheter: If dose between 2-2.5 g, infuse over 2 hours.    For central catheter: If dose is below 2 g, dose may be infused over 1 hour.    Admin. Amount: 1,500 mg  Last Admin: 18 1316  Dispense Loc: [facility]  Infused Over: 90 Minutes  Volume: 250 mL   Mixture Administration Information:   Medication Type Amount   vancomycin 100 mg/mL SOLR Medications 1,500 mg   sodium chloride 0.9 % SOLN Base 250 mL           Current Line: Peripheral IV 18 Left Upper forearm          1316 (1,500 mg)-New Bag       [ ] 2200          Completed Medications  Medications 18         Dose: 1,000 mL  Freq: ONCE Route: IV  Last Dose: Stopped (18)  Start: 18 1500   End: 18   Admin. Amount: 1,000 mL  Last Admin: 18 1538  Dispense Loc: [facility]  Administrations Remainin  Volume: 1,000 mL   Current Line: Peripheral IV 18 Left Upper forearm         1538 (1,000 mL)-New Bag       1718-Stopped              Dose: 74 mL  Freq: ONCE Route: IV  Start: 18 1306   End: 18 1435   Admin. Amount: 74 mL  Last Admin: 18 1435  Dispense Loc: [facility]  Administrations Remainin  Volume: 74 mL    Current Line: Peripheral IV 18 Left Upper forearm         1435 (74 mL)-Given              Dose: 1,000 mL  Freq: ONCE Route: IV  Last Dose: Stopped (18 153)  Start: 18 1242   End: 18 153   Admin. Amount: 1,000 mL  Last Admin: 18 1408  Dispense Loc: [facility]  Infused Over: 1 Hours  Administrations Remainin  Volume: 1,000 mL   Current Line: Peripheral IV 18 Left Upper forearm         1408 (1,000 mL)-New Bag       153-Stopped              Dose: 1 mg  Freq: ONCE Route: IV  Start: 18 1500   End: 18 1540   Admin Instructions: For IV PUSH: Dilute with equal volume of NS. For ordered doses up to 4 mg give IV Push. Administer each 2mg over 1-5 minutes.    Admin. Amount: 1 mg = 0.5 mL Conc: 2 mg/mL  Last Admin: 18 154  Dispense Loc: [facility]  Administrations Remainin  Volume: 1 mL   Current Line: Peripheral IV 18 Left Upper forearm         1540 (1 mg)-Given              Dose: 4 mg  Freq: ONCE Route: IV  Start: 18 141   End: 18 141   Admin Instructions: Irritant. For ordered doses up to 4 mg, give IV Push undiluted over 2-5 minutes.    Admin. Amount: 4 mg = 2 mL Conc: 4 mg/2 mL  Last Admin: 18 141  Dispense Loc: [facility]  Infused Over: 2-5 Minutes  Administrations Remainin  Volume: 2 mL          1415 (4 mg)-Given              Dose: 4.5 g  Freq: ONCE Route: IV  Indications of Use: SEPSIS  Last Dose: Stopped (18)  Start: 18 165   End: 18   Admin. Amount: 4.5 g = 100 mL Conc: 4.5 g/100 mL  Last Admin: 18  Dispense Loc: [facility]  Infused Over: 30 Minutes  Administrations Remainin  Volume: 100 mL   Current Line: Peripheral IV 18 Left Upper forearm         171 (4.5 g)-New Bag       -Stopped              Dose: 100 mL  Freq: ONCE Route: IV  Start: 18 1306   End: 18 1436   Admin. Amount: 100 mL  Last Admin: 18 143  Dispense Loc: [facility]  Stock  Administrations Remainin  Volume: 500 mL   Current Line: Peripheral IV 18 Left Upper forearm         1436 (100 mL)-Given              Dose: 2,000 mg  Freq: ONCE Route: IV  Indications of Use: SEPSIS  Start: 18   End: 18   Admin Instructions: Vesicant.    For peripheral catheter: If dose between 2-2.5 g, infuse over 2 hours.    For central catheter: If dose is below 2 g, dose may be infused over 1 hour.    Admin. Amount: 2,000 mg  Last Admin: 18  Dispense Loc: [facility]  Infused Over: 2 Hours  Administrations Remainin  Volume: 500 mL   Mixture Administration Information:   Medication Type Amount   vancomycin 100 mg/mL SOLR Medications 2,000 mg   sodium chloride 0.9 % SOLN Base 500 mL           Current Line: Peripheral IV 18 Left Upper forearm         2144 (2,000 mg)-Given           Discontinued Medications  Medications 18         Dose: 650 mg  Freq: EVERY 4 HOURS PRN Route: PO  PRN Reason: mild pain  Start: 18   End: 18   Admin Instructions: Alternate ibuprofen (if ordered) with acetaminophen.  Maximum acetaminophen dose from all sources = 75 mg/kg/day not to exceed 4 grams/day.    Admin. Amount: 2 tablet (2 × 325 mg tablet)          -Med Discontinued          Dose: 0.5 mg  Freq: EVERY 2 HOURS PRN Route: IV  PRN Reason: other  PRN Comment: IF unable to take PO. Pain control or improvement in physical function.  Start: 18   End: 18   Admin Instructions: Hold dose for analgesic side effects.  If needing IV pain meds for 2 or more doses call provider to have oral medications adjusted to get pain controlled on oral regimen prior to discharge.  For ordered doses up to 4 mg give IV Push undiluted. Administer each 2mg over 2-5 minutes.    Admin. Amount: 0.5 mg          4-Med Discontinued          Dose: 0.5 mg  Freq: EVERY 15 MIN PRN Route: IV  PRN  Reason: moderate to severe pain  Start: 18   End: 18   Admin Instructions: For ordered doses up to 4 mg give IV Push undiluted. Administer each 2mg over 2-5 minutes.    Admin. Amount: 0.5 mg  Last Admin: 18  Dispense Loc: [facility]  Administrations Remainin          1417 (0.5 mg)-Given       Med Discontinued          Dose: 1 mg  Freq: EVERY 4 HOURS PRN Route: IV  PRN Reason: anxiety  Start: 18   End: 18   Admin Instructions: For IV PUSH: Dilute with equal volume of NS. For ordered doses up to 4 mg give IV Push. Administer each 2mg over 1-5 minutes.    Admin. Amount: 1 mg = 0.5 mL Conc: 2 mg/mL  Dispense Loc: [facility]  Volume: 1 mL                 Med Discontinued       Or    Dose: 1 mg  Freq: EVERY 4 HOURS PRN Route: PO  PRN Reason: anxiety  Start: 18   End: 18   Admin. Amount: 1 tablet (1 × 1 mg tablet)  Last Admin: 18  Dispense Loc: [facility]           (1 mg)-Given       Med Discontinued          Dose: 0.1-0.4 mg  Freq: EVERY 2 MIN PRN Route: IV  PRN Reason: opioid reversal  Start: 18   End: 18   Admin Instructions: For respiratory rate LESS than or EQUAL to 8.  Partial reversal dose:  0.1 mg titrated q 2 minutes for Analgesia Side Effects Monitoring Sedation Level of 3 (frequently drowsy, arousable, drifts to sleep during conversation).Full reversal dose:  0.4 mg bolus for Analgesia Side Effects Monitoring Sedation Level of 4 (somnolent, minimal or no response to stimulation).  For ordered doses up to 2mg give IVP. Give each 0.4mg over 15 seconds in emergency situations. For non-emergent situations further dilute in 9mL of NS to facilitate titration of response.    Admin. Amount: 0.1-0.4 mg = 0.25-1 mL Conc: 0.4 mg/mL  Volume: 1 mL          Med Discontinued          Dose: 4 mg  Freq: EVERY 6 HOURS PRN Route: PO  PRN Reasons: nausea,vomiting  Start: 18  1846   End: 06/29/18 1904   Admin Instructions: This is Step 1 of nausea and vomiting management.  If nausea not resolved in 15 minutes, go to Step 2 prochlorperazine (COMPAZINE). Do not push through foil backing. Peel back foil and gently remove. Place on tongue immediately. Administration with liquid unnecessary  With dry hands, peel back foil backing and gently remove tablet; do not push oral disintegrating tablet through foil backing; administer immediately on tongue and oral disintegrating tablet dissolves in seconds; then swallow with saliva; liquid not required.    Admin. Amount: 1 tablet (1 × 4 mg tablet)          1904-Med Discontinued       Or    Dose: 4 mg  Freq: EVERY 6 HOURS PRN Route: IV  PRN Reasons: nausea,vomiting  Start: 06/29/18 1846   End: 06/29/18 1904   Admin Instructions: This is Step 1 of nausea and vomiting management.  If nausea not resolved in 15 minutes, go to Step 2 prochlorperazine (COMPAZINE).  Irritant. For ordered doses up to 4 mg, give IV Push undiluted over 2-5 minutes.    Admin. Amount: 4 mg = 2 mL Conc: 4 mg/2 mL  Infused Over: 2-5 Minutes  Volume: 2 mL          1904-Med Discontinued          Dose: 10 mEq  Freq: EVERY 1 HOUR PRN Route: IV  PRN Reason: potassium supplementation  Start: 06/29/18 2100   End: 06/29/18 2107   Admin Instructions: Infuse via PERIPHERAL LINE. Use potassium with lidocaine for pain with peripheral administration.  If Serum K+ 3.0-3.3, dose = 10 mEq/hr x4 doses (40 mEq IV total dose). Recheck K+ level 2 hours after dose and the next AM.  If Serum K+ less than 3.0, dose = 10 mEq/hr x6 doses (60 mEq IV total dose). Recheck K+ level 2 hours after dose and the next AM.    Admin. Amount: 10 mEq = 100 mL Conc: 10 mEq/100 mL  Infused Over: 1 Hours  Volume: 100 mL          2107-Med Discontinued          Rate: 125 mL/hr   Freq: CONTINUOUS Route: IV  Start: 06/29/18 1900   End: 06/29/18 1917                 1917-Med Discontinued     Medications 06/24/18 06/25/18  06/26/18 06/27/18 06/28/18 06/29/18 06/30/18

## 2018-06-29 NOTE — ED NOTES
"Patient has  [place] to Observation  order. Patient has been given the Observation brochure -  What does Observation mean to me.\"  Patient has been given the opportunity to ask questions about observation status and their plan of care.      Faith Nogueira    "

## 2018-06-29 NOTE — H&P
[facility] History and Physical    Suzy Rodríguez MRN# [id number]   Age: 32 year old YOB: 1985     Date of Admission:  2018    Home clinic: [facility]  Primary care provider: Rush Campbell          Chief Complaint:   R chest pain/ shoulder pain.    History is obtained from the patient          History of Present Illness:   This patient is a 32 year old  female with a significant past medical history of PCOD, recently dx R adrenal mass and surgery  who presents with R chest pain/ shoulder pain. She had it since surgery but was worse last evening. It hurt to breath. She notes no urinary tract symptoms voiding normally.  No frequency urgency or dysuria.  She states that she was told at the time of the surgery that the diaphragm was nicked. Denies hemoptesis, hemetemesis. Has had cough last couple days. No fever/chills.           Past Medical History:     Patient Active Problem List    Diagnosis Date Noted     Adrenal mass, right (H) 2018     Priority: Medium               Past Surgical History:      Past Surgical History:   Procedure Laterality Date     ADRENALECTOMY Right 2018    Procedure: ADRENALECTOMY;  Right Adrenalectomy,  Embolize Liver , Anesthesia Block;  Surgeon: Warren Mansfield MD;  Location: [facility] OR      SECTION      twice     EMBOLECTOMY ABDOMEN N/A 2018    Procedure: EMBOLECTOMY ABDOMEN;;  Surgeon: Ector Mcintyre MD;  Location: [facility] OR     GASTRIC BYPASS               Social History:     Social History     Social History     Marital status:      Spouse name: N/A     Number of children: N/A     Years of education: N/A     Occupational History     Not on file.     Social History Main Topics     Smoking status: Never Smoker     Smokeless tobacco: Never Used     Alcohol use Yes      Comment: occ.     Drug use: No     Sexual activity: No     Other Topics Concern     Not on file     Social History  Narrative             Family History:   No family history on file.          Allergies:     Allergies   Allergen Reactions     Bee Venom Swelling     Ibuprofen Other (See Comments), Hives and Swelling             Medications:     Prior to Admission medications    Medication Sig Last Dose Taking? Auth Provider   acetaminophen (TYLENOL) 325 MG tablet Take 2 tablets (650 mg) by mouth every 4 hours as needed for other (multimodal surgical pain management along with NSAIDS and opioid medication as indicated based on pain control and physical function.)   Yolanda Dupont PA   buPROPion (WELLBUTRIN) 100 MG tablet TAKE 1 TABLET (100 MG TOTAL) BY MOUTH 2 (TWO) TIMES A DAY.   Reported, Patient   calcium carbonate antacid 1000 MG CHEW Take 1 tablet by mouth every morning    Reported, Patient   cholecalciferol (VITAMIN D-1000 MAX ST) 1000 units TABS Take 2,000 Units by mouth every morning    Reported, Patient   enoxaparin (LOVENOX) 40 MG/0.4ML injection Inject 0.4 mLs (40 mg) Subcutaneous daily   Yolanda Dupont PA   EPINEPHrine (EPIPEN/ADRENACLICK/OR ANY BX GENERIC EQUIV) 0.3 MG/0.3ML injection 2-pack As directed   Reported, Patient   FLUoxetine (PROZAC) 20 MG capsule Take 20 mg by mouth every morning    Reported, Patient   hydrocortisone (CORTEF) 10 MG tablet Take 1 tablet (10 mg) by mouth daily At 2pm until follow up with Endocrinology (total: 30mg daily)   Yolanda Dupont PA   hydrocortisone (CORTEF) 20 MG tablet Take 1 tablet (20 mg) by mouth every morning (then 10mg at 2pm) until followup with Endocrinology   Yolanda Dupont PA   Lidocaine (LIDOCARE) 4 % Patch Place 2 patches onto the skin every 24 hours   Yolanda Dupont PA   LORazepam (ATIVAN) 0.5 MG tablet Take 1 tablet (0.5 mg) by mouth every 8 hours as needed for anxiety   Weight, Warren Patel MD   Multiple Vitamins-Calcium (ONE-A-DAY WOMENS PO) Take 2 tablets by mouth every morning    Reported, Patient   oxyCODONE IR (ROXICODONE) 10 MG tablet  Take 0.5-1 tablets (5-10 mg) by mouth every 4 hours as needed for moderate to severe pain   Yolanda Dupont PA   senna-docusate (SENOKOT-S;PERICOLACE) 8.6-50 MG per tablet Take 1 tablet by mouth 2 times daily To prevent constipation   Yolanda Dupont PA            Review of Systems:   The Review of Systems is negative in ALL other than noted in the HPI          Physical Exam:   Blood pressure 134/77, pulse 135, temperature 100.8  F (38.2  C), resp. rate 23, SpO2 93 %, not currently breastfeeding.  GENERAL APPEARANCE: healthy, alert and no distress  EYES: conjunctiva clear, eyes grossly normal  HENT: external ears and nose normal   NECK: supple, no masses or adenopathy  RESP: lungs -decreased BS R base - no rales, rhonchi or wheezes  CV: regular rate and rhythm, normal S1 S2, no S3 or S4 and no murmur, click or rub   ABDOMEN: soft, mild tenderness RUQ, no HSM or masses and bowel sounds normal  MS: no clubbing, cyanosis; no edema  SKIN: clear without significant rashes or lesions-incision healing well--no erythema/ discharge  NEURO: Normal strength and tone, sensory exam grossly normal, mentation intact and speech normal         Data:     Lab Results   Component Value Date    WBC 9.7 06/29/2018    HGB 8.3 (L) 06/29/2018    HCT 25.2 (L) 06/29/2018    MCV 90 06/29/2018     06/29/2018     Lab Results   Component Value Date     06/29/2018    CO2 30 06/29/2018     Lab Results   Component Value Date    BUN 9 06/29/2018     No components found for: SEDRATE  No components found for: DDIMER  No results found for: BNP  No results found for: TSH  No results found for: TROPONIN  UA RESULTS:  Recent Labs   Lab Test  06/29/18   1326   COLOR  Yellow   APPEARANCE  Slightly Cloudy   URINEGLC  Negative   URINEBILI  Negative   URINEKETONE  Negative   SG  1.006   UBLD  Large*   URINEPH  8.0*   PROTEIN  Negative   NITRITE  Negative   LEUKEST  Moderate*   RBCU  3*   WBCU  54*     Liver Function Studies -   Recent Labs    Lab Test  06/29/18   1310   PROTTOTAL  6.0*   ALBUMIN  2.6*   BILITOTAL  0.7   ALKPHOS  78   AST  35   ALT  173*         RADIOLOGY:  CT CHEST/ ABD/PELVIS-IMPRESSION:  1. Right adrenalectomy. There is subtle irregularity along the  posterior right inferior hemidiaphragm near the surgical site that is  at least moderately suspicious for potential focal diaphragmatic  injury in this area. There is small right pneumothorax and small gas  at the most inferior mediastinum that may relate to this finding.  2. Focal loculated fluid and collections of extraluminal gas at the  adrenalectomy site noted as above. Small ascites also noted.  3. Small right pleural effusion. Moderate right lung base atelectasis.  4. No other acute abnormality is identified. No pulmonary embolism or  acute abnormality.  5. Previously noted small pulmonary nodules are difficult to visualize  on this examination, but surveillance imaging should be considered on  the basis of the prior CT.  6. Small nonobstructing stone within the right kidney.        A/P  POST-OP ATELECTASIS WITH POSSIBLE PNEUMONIA/ SEPSIS  Has R sided CP, cough, fever and CT chest as above. Likely also immunocompromised from chronic steroids-(mass apparently was secreting cortisol--now on po steroids after surgery)  Started on vanc/ zosyn in ED. Added levaquin.  -would continue iv antbx. Could likely be discharged on po levaquin in AM    POSITIVE UA  Has no sx of dysuria.   -watch for cx. Continue antbx as above    ADRENAL CORTICAL CARCINOMA-LOW GRADE  R adrenal mass resected 6/25 at --path showed malignant. Tumor was secreting cortisol and pts had cushings sx/ sns. During surgery Had Small fracture of right lobe managed conservatively,  Diaphragmatic injury during hepatic mobilization repaired in two layers and negative leak test at conclusion of closure  Was started on po steroids post op to prevent abrupt drop in steroids after adrenalectomy.  -continue po steroids. F/u  surgery/ oncology as scheduled.    H/O MTHFR CLOTTING DISORDER(genetic hyperhomocysteinemia)  Started on sq lovenox x 14 days post op.  -continue lovenox    ANEMIA  Likely due to malignancy. Had post-op blood loss drop in hg and was tx 1 unit blood. Hg stable now and at baseline.     H/O DEPRESSION/ ANXIETY  Continue meds    DISPO  Pt says her pain is altready a little better. She is very stressed and anxious after hearing the news of her cancer just now from her surgeon. She thinks she would like to go home in AM with some antianxiety meds to help her tide this difficult time.   Admitted as obs.    DVT PROF-on lovenox     Nishi Sheffield MD  [phone]

## 2018-06-29 NOTE — PROGRESS NOTES
[facility]: Post-Discharge Note  SITUATION                                                      Admission:    Admission Date: 06/25/18   Reason for Admission: right open adrenalectomy  Discharge:    Discharge Date: 06/28/18   Discharge Diagnosis: right open adrenalectomy   Discharge Service: Urology   Discharge Plan: - Call your primary care provider to touch base regarding your recent admission.   - Follow up with Dr. Mansfield as scheduled in about 2 weeks  - Schedule an appointment to be seen by Endocrinology in about 3 weeks     BACKGROUND                                                      On 6/25/18 Ms. Rodríguez underwent right open adrenalectomy with Dr. Mansfield      ASSESSMENT      Patient reports symptoms are: Worsening (Patient is c/o upper shoulder pain, bad, and she reports muscles twitching below her right breast, she would like a call back from nurse)  Does the patient have all of their medications?: Yes  Does patient know what their new medications are for?: Yes  Does paient have a follow-up appointment scheduled?: Yes  Does patient have any other questions or concerns?: No      PLAN                                                      Outpatient Plan:  Routing to urology for f/u    Future Appointments  Date Time Provider Department Center   7/12/2018 7:40 AM Warren Mansfield MD [facility]   7/25/2018 11:20 AM Roel Beverly MD [facility]           Antoinette Emmanuel RN

## 2018-06-29 NOTE — ED PROVIDER NOTES
History     Chief Complaint   Patient presents with     Chest Pain     onset last night. the patient had a adrenal ectomy on monday and just found out its cancerous.      BRIANNE Rodríguez is a 32 year old female, past medical history significant for recently diagnosed right adrenal mass for which she underwent right adrenalectomy on 18 at [facility] with Dr. Linares.  She presents now with onset of right-sided chest pain and right shoulder pain beginning yesterday p.m. while at rest.  Mild shortness of breath, altered inspiration due to pain.  Denies fever chills or sweats although noted in triage vital signs of 100.8 oral temperature at arrival.  She notes no vomiting although mild nausea.  Anorexia.  She has had a bowel movement since Monday.  She is on oxycodone for pain.  She notes no urinary tract symptoms voiding normally.  No frequency urgency or dysuria.  She states that she was told at the time of the surgery that the diaphragm was nicked.      Problem List:    Patient Active Problem List    Diagnosis Date Noted     Adrenal mass, right (H) 2018     Priority: Medium        Past Medical History:    Past Medical History:   Diagnosis Date     Adrenal mass (H)        Past Surgical History:    Past Surgical History:   Procedure Laterality Date     ADRENALECTOMY Right 2018    Procedure: ADRENALECTOMY;  Right Adrenalectomy,  Embolize Liver , Anesthesia Block;  Surgeon: Warren Mansfield MD;  Location: [facility] OR      SECTION      twice     EMBOLECTOMY ABDOMEN N/A 2018    Procedure: EMBOLECTOMY ABDOMEN;;  Surgeon: Ecotr Mcintyre MD;  Location: [facility] OR     GASTRIC BYPASS         Family History:    No family history on file.    Social History:  Marital Status:   [2]  Social History   Substance Use Topics     Smoking status: Never Smoker     Smokeless tobacco: Never Used     Alcohol use Yes      Comment: occ.        Medications:       acetaminophen (TYLENOL) 325 MG tablet   buPROPion (WELLBUTRIN) 100 MG tablet   calcium carbonate antacid 1000 MG CHEW   cholecalciferol (VITAMIN D-1000 MAX ST) 1000 units TABS   enoxaparin (LOVENOX) 40 MG/0.4ML injection   EPINEPHrine (EPIPEN/ADRENACLICK/OR ANY BX GENERIC EQUIV) 0.3 MG/0.3ML injection 2-pack   FLUoxetine (PROZAC) 20 MG capsule   hydrocortisone (CORTEF) 10 MG tablet   hydrocortisone (CORTEF) 20 MG tablet   Lidocaine (LIDOCARE) 4 % Patch   LORazepam (ATIVAN) 0.5 MG tablet   Multiple Vitamins-Calcium (ONE-A-DAY WOMENS PO)   oxyCODONE IR (ROXICODONE) 10 MG tablet   senna-docusate (SENOKOT-S;PERICOLACE) 8.6-50 MG per tablet         Review of Systems   All other systems reviewed and are negative.      Physical Exam   BP: 92/65  Pulse: 135  Heart Rate: 98  Temp: 100.8  F (38.2  C)  Resp: 24  SpO2: 99 %      Physical Exam   Constitutional: She is oriented to person, place, and time. She appears well-developed and well-nourished.   Patient is tearful, anxious, she just under 10 minutes ago that the adrenal mass removed was cancerous   HENT:   Head: Normocephalic and atraumatic.   Right Ear: External ear normal.   Left Ear: External ear normal.   Nose: Nose normal.   Mouth/Throat: Oropharynx is clear and moist.   Eyes: Conjunctivae and EOM are normal. Pupils are equal, round, and reactive to light.   Neck: Normal range of motion. Neck supple.   Cardiovascular: Normal rate, regular rhythm, normal heart sounds and intact distal pulses.    Pulmonary/Chest: Effort normal and breath sounds normal. She exhibits tenderness.   Normal inspiratory effort, no clinical pneumothorax auscultation.  Tenderness to palpation in the right lateral anterior chest wall as well as the right upper abdomen.  The abdominal binder was briefly removed and her incision line inspected.  There is no evidence of dehiscence or infection.  Bowel sounds are normal.   Abdominal: Soft. Bowel sounds are normal. There is tenderness.    Musculoskeletal: Normal range of motion.   Neurological: She is alert and oriented to person, place, and time.   Skin: Skin is warm and dry.   Psychiatric: She has a normal mood and affect.   Nursing note and vitals reviewed.      ED Course     ED Course     Procedures               Critical Care time:  none               Results for orders placed or performed during the hospital encounter of 06/29/18 (from the past 24 hour(s))   CBC with platelets differential   Result Value Ref Range    WBC 9.7 4.0 - 11.0 10e9/L    RBC Count 2.80 (L) 3.8 - 5.2 10e12/L    Hemoglobin 8.3 (L) 11.7 - 15.7 g/dL    Hematocrit 25.2 (L) 35.0 - 47.0 %    MCV 90 78 - 100 fl    MCH 29.6 26.5 - 33.0 pg    MCHC 32.9 31.5 - 36.5 g/dL    RDW 14.6 10.0 - 15.0 %    Platelet Count 256 150 - 450 10e9/L    Diff Method Automated Method     % Neutrophils 72.5 %    % Lymphocytes 17.8 %    % Monocytes 7.5 %    % Eosinophils 0.2 %    % Basophils 0.3 %    % Immature Granulocytes 1.7 %    Nucleated RBCs 0 0 /100    Absolute Neutrophil 7.1 1.6 - 8.3 10e9/L    Absolute Lymphocytes 1.7 0.8 - 5.3 10e9/L    Absolute Monocytes 0.7 0.0 - 1.3 10e9/L    Absolute Eosinophils 0.0 0.0 - 0.7 10e9/L    Absolute Basophils 0.0 0.0 - 0.2 10e9/L    Abs Immature Granulocytes 0.2 0 - 0.4 10e9/L    Absolute Nucleated RBC 0.0    INR   Result Value Ref Range    INR 0.89 0.86 - 1.14   Comprehensive metabolic panel   Result Value Ref Range    Sodium 140 133 - 144 mmol/L    Potassium 3.1 (L) 3.4 - 5.3 mmol/L    Chloride 102 94 - 109 mmol/L    Carbon Dioxide 30 20 - 32 mmol/L    Anion Gap 8 3 - 14 mmol/L    Glucose 93 70 - 99 mg/dL    Urea Nitrogen 9 7 - 30 mg/dL    Creatinine 0.59 0.52 - 1.04 mg/dL    GFR Estimate >90 >60 mL/min/1.7m2    GFR Estimate If Black >90 >60 mL/min/1.7m2    Calcium 8.5 8.5 - 10.1 mg/dL    Bilirubin Total 0.7 0.2 - 1.3 mg/dL    Albumin 2.6 (L) 3.4 - 5.0 g/dL    Protein Total 6.0 (L) 6.8 - 8.8 g/dL    Alkaline Phosphatase 78 40 - 150 U/L     (H) 0 -  50 U/L    AST 35 0 - 45 U/L   Lipase   Result Value Ref Range    Lipase 144 73 - 393 U/L   Lactic acid whole blood   Result Value Ref Range    Lactic Acid 2.2 (H) 0.7 - 2.0 mmol/L   Troponin I   Result Value Ref Range    Troponin I ES <0.015 0.000 - 0.045 ug/L   UA reflex to Microscopic and Culture   Result Value Ref Range    Color Urine Yellow     Appearance Urine Slightly Cloudy     Glucose Urine Negative NEG^Negative mg/dL    Bilirubin Urine Negative NEG^Negative    Ketones Urine Negative NEG^Negative mg/dL    Specific Gravity Urine 1.006 1.003 - 1.035    Blood Urine Large (A) NEG^Negative    pH Urine 8.0 (H) 5.0 - 7.0 pH    Protein Albumin Urine Negative NEG^Negative mg/dL    Urobilinogen mg/dL 0.0 0.0 - 2.0 mg/dL    Nitrite Urine Negative NEG^Negative    Leukocyte Esterase Urine Moderate (A) NEG^Negative    Source Midstream Urine     RBC Urine 3 (H) 0 - 2 /HPF    WBC Urine 54 (H) 0 - 5 /HPF    Squamous Epithelial /HPF Urine 8 (H) 0 - 1 /HPF    Mucous Urine Present (A) NEG^Negative /LPF   Urine Culture Aerobic Bacterial   Result Value Ref Range    Specimen Description Midstream Urine     Special Requests Specimen received in preservative     Culture Micro PENDING    Blood culture   Result Value Ref Range    Specimen Description Blood Right Hand     Special Requests Aerobic and anaerobic bottles received     Culture Micro PENDING    Blood culture   Result Value Ref Range    Specimen Description Blood Left Arm     Special Requests Aerobic and anaerobic bottles received     Culture Micro PENDING    CT Chest/Abdomen/Pelvis w Contrast    Narrative    CT CHEST/ABDOMEN/PELVIS WITH CONTRAST  6/29/2018 3:00 PM    HISTORY: Chest pain/abdominal pain with recent right adrenalectomy  with possible diaphragm injury. Rule out pulmonary embolism,  pneumothorax, abdomen/diaphragm hematoma.    TECHNIQUE: CT scan obtained of the chest, abdomen, and pelvis with  oral and IV contrast. 74mL Isovue-370 injected. Radiation dose  for  this scan was reduced using automated exposure control, adjustment of  the mA and/or kV according to patient size, or iterative  reconstruction technique.    COMPARISON:  CT chest 6/14/2018. MRI abdomen 5/9/2018.    FINDINGS:  Chest: No acute thoracic aortic abnormality. No evidence for pulmonary  embolism. No enlarged lymph nodes identified. Small right pleural  effusion newly identified. New finding of small right pneumothorax.  Small bubble of gas at the inferior pericardial fat and anterior to  the midline esophagus series 4 image 88. There is moderate right lower  lobe base atelectasis. Left lung appears clear of any acute  abnormality. Previously noted lingular pulmonary nodule is difficult  to visualize currently. Previously noted small left lower lobe nodule  is also very difficult to correlate on the current examination.    Abdomen/pelvis: The right adrenal mass has been resected. There are  scattered bubbles of extraluminal gas at the adrenalectomy site and at  other locations at the upper abdomen. There is a somewhat ill-defined  lobulated an irregular appearance of the posterior right hemidiaphragm  on series 9 image 15 that is immediately superior to the operative  region. Also see coronal series 10 image 98. There is a locule of  fluid at the adrenalectomy site measuring 6.4 cm image 24. There is  small ascites adjacent to the right and posterior liver. A more  prominent collection of gas and some internal fluid versus other  postoperative material lying immediately posterior to the right liver  is 6.2 x 3.3 cm series 9 image 40.    A nodular lesion measuring 2.8 cm is stable at the inferior right  liver with peripheral nodular enhancement series 9 image 50. The prior  MRI imaging suggests a potential hemangioma. No acute hepatic  abnormality. Gallbladder, left adrenal, spleen, pancreas, and kidneys  show no acute abnormalities otherwise. Nonobstructing stone at the  right kidney. 0.2 cm series 9  image 35. No hydronephrosis.    No acute bowel abnormality. No bowel obstruction is seen. Prominent  stool at the proximal colon. Pelvis shows no fluid collections. No  convincing adenopathy.      Impression    IMPRESSION:  1. Right adrenalectomy. There is subtle irregularity along the  posterior right inferior hemidiaphragm near the surgical site that is  at least moderately suspicious for potential focal diaphragmatic  injury in this area. There is small right pneumothorax and small gas  at the most inferior mediastinum that may relate to this finding.  2. Focal loculated fluid and collections of extraluminal gas at the  adrenalectomy site noted as above. Small ascites also noted.  3. Small right pleural effusion. Moderate right lung base atelectasis.  4. No other acute abnormality is identified. No pulmonary embolism or  acute abnormality.  5. Previously noted small pulmonary nodules are difficult to visualize  on this examination, but surveillance imaging should be considered on  the basis of the prior CT.  6. Small nonobstructing stone within the right kidney.    VALENTINE OLIVEIRA MD       Medications   lactated ringers BOLUS 1,000 mL (0 mLs Intravenous Stopped 6/29/18 1537)     Followed by   lactated ringers infusion (not administered)   HYDROmorphone (PF) (DILAUDID) injection 0.5 mg (0.5 mg Intravenous Given 6/29/18 1417)   piperacillin-tazobactam (ZOSYN) intermittent infusion 4.5 g (not administered)   iopamidol (ISOVUE-370) solution 74 mL (74 mLs Intravenous Given 6/29/18 1435)   sodium chloride 0.9 % bag 500mL for CT scan flush use (100 mLs Intravenous Given 6/29/18 1436)   ondansetron (ZOFRAN) injection 4 mg (4 mg Intravenous Given 6/29/18 1415)   LORazepam (ATIVAN) injection 1 mg (1 mg Intravenous Given 6/29/18 1540)   0.9% sodium chloride BOLUS (1,000 mLs Intravenous New Bag 6/29/18 1538)     4:26 PM  Patient is reviewed with on-call neurology Dr. Man for Dr. Mansfield who is not available today.  Specifically  reviewed the patient's presentation, lab diagnostics and imaging studies.  She reviewed the patient's CT.  She felt it would be appropriate to admit the patient with broad-spectrum antibiotic coverage at our facility.  She did not feel the patient required transfer at this point.  If they are not clearly improving it could be discussed with neurology on-call further for potential transfer.  4:41 PM  The patient was reviewed with the on-call hospitalist Dr. Sheffield who agreed to see the patient in the emergency department.  Transition orders are placed by myself in the emergency department.      Assessments & Plan (with Medical Decision Making)   32-year-old female past medical history reviewed as above who presents to the emergency department with acute right-sided shoulder and chest pain in the context of stop right adrenalectomy approximately 4 days prior to presentation as discussed in the HPI.  Physical exam finds a patient alert, tearful, uncomfortable.  No clinical evidence for pneumothorax.  Nonsurgical abdomen.  Lab diagnostics are reviewed as above and discussed with the patient and her significant other.  I obtained imaging to include chest abdomen and pelvis given the patient's presentation to consider potential surgical complications intra-abdominally as well as the possibility of postop pulmonary embolism and also pneumothorax and also pneumonia.  Results were reviewed as above and are discussed with on-call urology Dr. Gar for Dr. crooks at [facility].  Please see discussion at the time stamps above.  The patient is discussed also with on-call hospitalist and transition orders are placed by myself in the emergency department.    Disclaimer: This note consists of symbols derived from keyboarding, dictation and/or voice recognition software. As a result, there may be errors in the script that have gone undetected. Please consider this when interpreting information found in this  chart.      I have reviewed the nursing notes.    I have reviewed the findings, diagnosis, plan and need for follow up with the patient.          New Prescriptions    No medications on file       Final diagnoses:   Acute post-operative pain   Fever, unspecified fever cause - Etiology unclear       6/29/2018   [facility] EMERGENCY DEPARTMENT     Abilio Staples MD  06/29/18 8484

## 2018-06-29 NOTE — PLAN OF CARE
"Problem: Patient Care Overview  Goal: Plan of Care/Patient Progress Review  [facility] [facility] ADMISSION NOTE    Patient admitted to room 2215 at approximately 1810 via wheel chair from emergency room. Patient was accompanied by spouse and mother.     Verbal SBAR report received from [facility] prior to patient arrival.     Patient ambulated to bed with stand-by assist. Patient alert and oriented X 3. Pain is controlled with current analgesics.  Medication(s) being used: narcotic analgesics including hydromorphone (Dilaudid).  . Admission vital signs: Blood pressure 126/80, pulse 100, temperature 100.7  F (38.2  C), temperature source Oral, resp. rate 20, height 1.626 m (5' 4\"), weight 78 kg (171 lb 15.3 oz), SpO2 95 %, not currently breastfeeding. Patient and mother were oriented to plan of care, call light, bed controls, tv, telephone, bathroom and visiting hours.     Risk Assessment    The following safety risks were identified during admission: none. Yellow risk band applied: NO.     Skin Initial Assessment    This writer admitted this patient and completed a full skin assessment and Bhanu score in the Adult PCS flowsheet. Appropriate interventions initiated as needed.     Skin  Skin WDL:  WDL except  Skin Integrity: incision(s)         Macy Cruz        "

## 2018-06-29 NOTE — IP AVS SNAPSHOT
"` `           [facility] SURGICAL: [phone]                                              INTERAGENCY TRANSFER FORM - NURSING   2018                    Hospital Admission Date: 2018  VALERY PANDYA   : 1985  Sex: Female        Attending Provider: Jorge Silverman MD     Allergies:  Bee Venom, Ibuprofen    Infection:  None   Service:  GENERAL MEDI    Ht:  1.626 m (5' 4\")   Wt:  78 kg (171 lb 15.3 oz)   Admission Wt:  78 kg (171 lb 15.3 oz)    BMI:  29.52 kg/m 2   BSA:  1.88 m 2            Patient PCP Information     Provider PCP Type    [facility]      Current Code Status     Date Active Code Status Order ID Comments User Context       Prior      Code Status History     Date Active Date Inactive Code Status Order ID Comments User Context    2018  8:26 AM  Full Code [id number]  Jorge Silverman MD Outpatient    2018 10:12 PM 2018  3:58 PM Full Code [id number]  Rufino Rosa MD Inpatient      Advance Directives        Scanned docmt in ACP Activity?           No scanned doc        Hospital Problems as of 2018              Priority Class Noted POA    * (Principal)Acute post-operative pain Medium  2018 Yes    Chest pain Medium  2018 Yes    Postoperative anemia due to acute blood loss Medium  2018 Yes    Pleural effusion on right Medium  2018 Yes    Ascites Medium  2018 Yes    Adrenal cortex cancer, 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      Non-Hospital Problems as of 2018              Priority Class Noted    Adrenal mass, right (H) Medium  2018      Immunizations     None         END      ASSESSMENT     Discharge Profile Flowsheet     EXPECTED DISCHARGE     Inspection of bony prominences  Full except (identify areas not inspected) 18 1041    Expected Discharge Date  18 1249   Full except areas not inspected   Sacrum;Coccyx 18 1041    DISCHARGE NEEDS ASSESSMENT     Skin WDL  ex " "06/30/18 1041    Transportation Available  family or friend will provide 06/26/18 1025   Skin Color/Characteristics  bruised (ecchymotic) 06/30/18 0207    GASTROINTESTINAL (ADULT,PEDIATRIC,OB)     Skin Temperature  warm 06/30/18 0207    GI WDL  WDL 06/30/18 1041   Skin Moisture  dry 06/30/18 0207    Abdominal Appearance  rounded 06/30/18 1041   Skin Elasticity  quick return to original state 06/30/18 0207    Last Bowel Movement  06/25/18 06/30/18 0207   Skin Integrity  incision(s) 06/30/18 1041    GI Signs/Symptoms  abdominal discomfort 06/30/18 1041   SAFETY      COMMUNICATION ASSESSMENT     Safety WDL  WDL 06/30/18 1041    Patient's communication style  spoken language (English or Bilingual) 06/29/18 1227   Safety Factors  ID band on 06/27/18 2241    SKIN                        Assessment WDL (Within Defined Limits) Definitions           Safety WDL     Effective: 09/28/15    Row Information: <b>WDL Definition:</b> Bed in low position, wheels locked; call light in reach; upper side rails up x 2; ID band on<br> <font color=\"gray\"><i>Item=AS safety wdl>>List=AS safety wdl>>Version=F14</i></font>      Skin WDL     Effective: 09/28/15    Row Information: <b>WDL Definition:</b> Warm; dry; intact; elastic; without discoloration; pressure points without redness<br> <font color=\"gray\"><i>Item=AS skin wdl>>List=AS skin wdl>>Version=F14</i></font>      Vitals     Vital Signs Flowsheet     VITAL SIGNS     Sleep  awake with occasional pain 06/27/18 2237    Temp  98.7  F (37.1  C) 06/30/18 0731   ANALGESIA SIDE EFFECTS MONITORING      Temp src  Oral 06/30/18 0731   Side Effects Monitoring: Respiratory Quality  R 06/30/18 1332    Resp  16 06/30/18 0731   Side Effects Monitoring: Respiratory Depth  N 06/30/18 1332    Pulse  90 06/30/18 0523   Side Effects Monitoring: Sedation Level  S 06/30/18 1332    Heart Rate  96 06/30/18 0731   HEIGHT AND WEIGHT      Pulse/Heart Rate Source  Monitor 06/30/18 0731   Height  1.626 m (5' 4\") " 06/29/18 1842    BP  136/65 06/30/18 0731   Weight  78 kg (171 lb 15.3 oz) 06/29/18 1842    BP Location  Right arm 06/30/18 0731   Weight Method  Bed scale 06/29/18 1842    Patient Position  Sitting 06/25/18 1207   BSA (Calculated - sq m)  1.88 06/29/18 1842    OXYGEN THERAPY     BMI (Calculated)  29.58 06/29/18 1842    SpO2  100 % 06/30/18 0731   POSITIONING      O2 Device  None (Room air) 06/30/18 0731   Body Position  independently positioning 06/30/18 1041    PAIN/COMFORT     Head of Bed (HOB)  HOB at 45 degrees 06/30/18 0207    Patient Currently in Pain  sleeping: patient not able to self report 06/30/18 1332   Chair  Upright in chair 06/27/18 1839    Preferred Pain Scale  CAPA (Clinically Aligned Pain Assessment) ([facility] Adults Only) 06/30/18 0502   DAILY CARE      0-10 Pain Scale  6 06/30/18 0502   Activity Management  activity adjusted per tolerance 06/30/18 1041    Pain Location  Abdomen 06/30/18 0502   Activity Assistance Provided  independent 06/28/18 1029    Pain Intervention(s)  Medication (See eMAR) 06/30/18 0501   ECG      Response to Interventions  Decrease in pain 06/30/18 0502   ECG Rhythm  Sinus rhythm 06/25/18 2152    Additional Documentation  CAPA (Group) 06/30/18 0502   Ectopy  None 06/25/18 2152    CLINICALLY ALIGNED PAIN ASSESSMENT (CAPA) ([facility] ADULTS ONLY)     Ectopy Frequency  Rare 06/25/18 2152    Comfort  comfortably manageable 06/30/18 1332   Lead Monitored  Lead II;V 5 06/25/18 2152    Change in Pain  getting worse 06/30/18 1332   RESPIRATORY MONITORING      Pain Control  partially effective 06/30/18 1036   Respiratory Monitoring (EtCO2)  36 mmHg 06/26/18 1540    Functioning  can do most things, but pain gets in the way of some 06/30/18 1332   Integrated Pulmonary Index (IPI)  10 06/26/18 1540            Patient Lines/Drains/Airways Status    Active LINES/DRAINS/AIRWAYS     Name: Placement date: Placement time: Site: Days: Last dressing change:     Peripheral IV 06/29/18 Left Upper forearm 06/29/18   1400   Upper forearm   1     Incision/Surgical Site 06/25/18 Bilateral Abdomen 06/25/18   1804    4             Patient Lines/Drains/Airways Status    Active PICC/CVC     None            Intake/Output Detail Report     Date Intake     Output    Shift P.O. I.V. IV Piggyback Total Total       Noc 06/28/18 2300 - 06/29/18 0659 -- -- -- -- -- 0    Day 06/29/18 0700 - 06/29/18 1459 -- -- -- -- -- 0    Ludy 06/29/18 1500 - 06/29/18 2259 -- -- 1500 1500 -- 1500    Noc 06/29/18 2300 - 06/30/18 0659 -- 1175 -- 1175 -- 1175    Day 06/30/18 0700 - 06/30/18 1459 300 -- -- 300 -- 300      Last Void/BM       Most Recent Value    Urine Occurrence 1 at 06/30/2018 0731    Stool Occurrence       Case Management/Discharge Planning     Case Management/Discharge Planning Flowsheet     LIVING ENVIRONMENT     ABUSE RISK SCREEN      Lives With  spouse;child(kaz), dependent 06/29/18 1853   QUESTION TO PATIENT:  Has a member of your family or a partner(now or in the past) intimidated, hurt, manipulated, or controlled you in any way?  no 06/29/18 1853    Living Arrangements  house 06/26/18 1025   QUESTION TO PATIENT: Do you feel safe going back to the place where you are living?  yes 06/29/18 1853    COPING/STRESS     OBSERVATION: Is there reason to believe there has been maltreatment of a vulnerable adult (ie. Physical/Sexual/Emotional abuse, self neglect, lack of adequate food, shelter, medical care, or financial exploitation)?  no 06/29/18 1853    Major Change/Loss/Stressor  surgery/procedure;illness 06/29/18 1853   OTHER      EXPECTED DISCHARGE     Are you depressed or being treated for depression?  Yes 06/29/18 1853    Expected Discharge Date  06/30/18 06/30/18 1249   / CAREGIVER      DISCHARGE PLANNING     Filed Complexity Screen Score  2 06/26/18 1346    Transportation Available  family or friend will provide 06/26/18 1025

## 2018-06-29 NOTE — ED NOTES
DATE:  6/29/2018   TIME OF RECEIPT FROM LAB:  5701  LAB TEST:  Lactic  LAB VALUE:  2.2  RESULTS GIVEN WITH READ-BACK TO (PROVIDER):  Dr. Staples  TIME LAB VALUE REPORTED TO PROVIDER:   5707

## 2018-06-29 NOTE — ED NOTES
The patient admits to taking one of her own pain meds (oxycodone) without mentioning it to the RN. MD updated.

## 2018-06-30 ENCOUNTER — HOSPITAL ENCOUNTER (INPATIENT)
Facility: CLINIC | Age: 33
LOS: 1 days | Discharge: HOME | DRG: 920 | End: 2018-07-01
Attending: UROLOGY | Admitting: UROLOGY
Payer: COMMERCIAL

## 2018-06-30 ENCOUNTER — RECORDS - HEALTHEAST (OUTPATIENT)
Dept: ADMINISTRATIVE | Facility: OTHER | Age: 33
End: 2018-06-30

## 2018-06-30 ENCOUNTER — APPOINTMENT (OUTPATIENT)
Dept: GENERAL RADIOLOGY | Facility: CLINIC | Age: 33
DRG: 920 | End: 2018-06-30
Attending: UROLOGY
Payer: COMMERCIAL

## 2018-06-30 VITALS
TEMPERATURE: 98.7 F | DIASTOLIC BLOOD PRESSURE: 65 MMHG | BODY MASS INDEX: 29.36 KG/M2 | RESPIRATION RATE: 16 BRPM | HEIGHT: 64 IN | SYSTOLIC BLOOD PRESSURE: 136 MMHG | WEIGHT: 171.96 LBS | OXYGEN SATURATION: 100 % | HEART RATE: 90 BPM

## 2018-06-30 DIAGNOSIS — E27.8 ADRENAL MASS, RIGHT (H): ICD-10-CM

## 2018-06-30 DIAGNOSIS — Z15.89 H/O MTHFR MUTATION: ICD-10-CM

## 2018-06-30 PROBLEM — D62 POSTOPERATIVE ANEMIA DUE TO ACUTE BLOOD LOSS: Status: ACTIVE | Noted: 2018-06-30

## 2018-06-30 PROBLEM — R18.8 OTHER ASCITES: Status: ACTIVE | Noted: 2018-06-30

## 2018-06-30 PROBLEM — J90 PLEURAL EFFUSION ON RIGHT: Status: ACTIVE | Noted: 2018-06-30

## 2018-06-30 PROBLEM — D64.9 ANEMIA: Status: ACTIVE | Noted: 2018-06-30

## 2018-06-30 PROBLEM — C74.01: Status: ACTIVE | Noted: 2018-06-30

## 2018-06-30 LAB
ALBUMIN UR-MCNC: NEGATIVE MG/DL
ANION GAP SERPL CALCULATED.3IONS-SCNC: 5 MMOL/L (ref 3–14)
ANION GAP SERPL CALCULATED.3IONS-SCNC: 8 MMOL/L (ref 3–14)
APPEARANCE UR: CLEAR
BACTERIA SPEC CULT: NORMAL
BILIRUB UR QL STRIP: NEGATIVE
BUN SERPL-MCNC: 6 MG/DL (ref 7–30)
BUN SERPL-MCNC: 6 MG/DL (ref 7–30)
CALCIUM SERPL-MCNC: 8.2 MG/DL (ref 8.5–10.1)
CALCIUM SERPL-MCNC: 8.3 MG/DL (ref 8.5–10.1)
CHLORIDE SERPL-SCNC: 102 MMOL/L (ref 94–109)
CHLORIDE SERPL-SCNC: 104 MMOL/L (ref 94–109)
CO2 SERPL-SCNC: 28 MMOL/L (ref 20–32)
CO2 SERPL-SCNC: 30 MMOL/L (ref 20–32)
COLOR UR AUTO: YELLOW
CREAT SERPL-MCNC: 0.59 MG/DL (ref 0.52–1.04)
CREAT SERPL-MCNC: 0.62 MG/DL (ref 0.52–1.04)
ERYTHROCYTE [DISTWIDTH] IN BLOOD BY AUTOMATED COUNT: 15.6 % (ref 10–15)
ERYTHROCYTE [DISTWIDTH] IN BLOOD BY AUTOMATED COUNT: 16 % (ref 10–15)
GFR SERPL CREATININE-BSD FRML MDRD: >90 ML/MIN/1.7M2
GFR SERPL CREATININE-BSD FRML MDRD: >90 ML/MIN/1.7M2
GLUCOSE SERPL-MCNC: 137 MG/DL (ref 70–99)
GLUCOSE SERPL-MCNC: 81 MG/DL (ref 70–99)
GLUCOSE UR STRIP-MCNC: NEGATIVE MG/DL
HCT VFR BLD AUTO: 22.8 % (ref 35–47)
HCT VFR BLD AUTO: 25.2 % (ref 35–47)
HGB BLD-MCNC: 7.1 G/DL (ref 11.7–15.7)
HGB BLD-MCNC: 8 G/DL (ref 11.7–15.7)
HGB UR QL STRIP: ABNORMAL
KETONES UR STRIP-MCNC: NEGATIVE MG/DL
LEUKOCYTE ESTERASE UR QL STRIP: ABNORMAL
Lab: NORMAL
MCH RBC QN AUTO: 30 PG (ref 26.5–33)
MCH RBC QN AUTO: 30 PG (ref 26.5–33)
MCHC RBC AUTO-ENTMCNC: 31.1 G/DL (ref 31.5–36.5)
MCHC RBC AUTO-ENTMCNC: 31.7 G/DL (ref 31.5–36.5)
MCV RBC AUTO: 94 FL (ref 78–100)
MCV RBC AUTO: 96 FL (ref 78–100)
MUCOUS THREADS #/AREA URNS LPF: PRESENT /LPF
NITRATE UR QL: NEGATIVE
PH UR STRIP: 7.5 PH (ref 5–7)
PLATELET # BLD AUTO: 240 10E9/L (ref 150–450)
PLATELET # BLD AUTO: 242 10E9/L (ref 150–450)
POTASSIUM SERPL-SCNC: 3.8 MMOL/L (ref 3.4–5.3)
POTASSIUM SERPL-SCNC: 4.1 MMOL/L (ref 3.4–5.3)
RBC # BLD AUTO: 2.37 10E12/L (ref 3.8–5.2)
RBC # BLD AUTO: 2.67 10E12/L (ref 3.8–5.2)
RBC #/AREA URNS AUTO: 9 /HPF (ref 0–2)
SODIUM SERPL-SCNC: 138 MMOL/L (ref 133–144)
SODIUM SERPL-SCNC: 139 MMOL/L (ref 133–144)
SOURCE: ABNORMAL
SP GR UR STRIP: 1.01 (ref 1–1.03)
SPECIMEN SOURCE: NORMAL
SQUAMOUS #/AREA URNS AUTO: 1 /HPF (ref 0–1)
TRANS CELLS #/AREA URNS HPF: <1 /HPF (ref 0–1)
UROBILINOGEN UR STRIP-MCNC: NORMAL MG/DL (ref 0–2)
WBC # BLD AUTO: 7.8 10E9/L (ref 4–11)
WBC # BLD AUTO: 8.9 10E9/L (ref 4–11)
WBC #/AREA URNS AUTO: 1 /HPF (ref 0–5)

## 2018-06-30 PROCEDURE — 25000128 H RX IP 250 OP 636

## 2018-06-30 PROCEDURE — 99207 ZZC CDG-CODE CATEGORY CHANGED: CPT

## 2018-06-30 PROCEDURE — 25000132 ZZH RX MED GY IP 250 OP 250 PS 637: Performed by: INTERNAL MEDICINE

## 2018-06-30 PROCEDURE — 40000556 ZZH STATISTIC PERIPHERAL IV START W US GUIDANCE

## 2018-06-30 PROCEDURE — 25000132 ZZH RX MED GY IP 250 OP 250 PS 637: Performed by: STUDENT IN AN ORGANIZED HEALTH CARE EDUCATION/TRAINING PROGRAM

## 2018-06-30 PROCEDURE — 36415 COLL VENOUS BLD VENIPUNCTURE: CPT | Performed by: STUDENT IN AN ORGANIZED HEALTH CARE EDUCATION/TRAINING PROGRAM

## 2018-06-30 PROCEDURE — 99217 ZZC OBSERVATION CARE DISCHARGE: CPT

## 2018-06-30 PROCEDURE — 36415 COLL VENOUS BLD VENIPUNCTURE: CPT | Performed by: INTERNAL MEDICINE

## 2018-06-30 PROCEDURE — 71046 X-RAY EXAM CHEST 2 VIEWS: CPT

## 2018-06-30 PROCEDURE — 85027 COMPLETE CBC AUTOMATED: CPT | Performed by: INTERNAL MEDICINE

## 2018-06-30 PROCEDURE — 25000132 ZZH RX MED GY IP 250 OP 250 PS 637

## 2018-06-30 PROCEDURE — 25000128 H RX IP 250 OP 636: Performed by: STUDENT IN AN ORGANIZED HEALTH CARE EDUCATION/TRAINING PROGRAM

## 2018-06-30 PROCEDURE — 80048 BASIC METABOLIC PNL TOTAL CA: CPT | Performed by: INTERNAL MEDICINE

## 2018-06-30 PROCEDURE — 85027 COMPLETE CBC AUTOMATED: CPT | Performed by: STUDENT IN AN ORGANIZED HEALTH CARE EDUCATION/TRAINING PROGRAM

## 2018-06-30 PROCEDURE — G0378 HOSPITAL OBSERVATION PER HR: HCPCS

## 2018-06-30 PROCEDURE — 25000128 H RX IP 250 OP 636: Performed by: INTERNAL MEDICINE

## 2018-06-30 PROCEDURE — 96376 TX/PRO/DX INJ SAME DRUG ADON: CPT

## 2018-06-30 PROCEDURE — 12000008 ZZH R&B INTERMEDIATE UMMC

## 2018-06-30 PROCEDURE — 81001 URINALYSIS AUTO W/SCOPE: CPT | Performed by: UROLOGY

## 2018-06-30 PROCEDURE — 80048 BASIC METABOLIC PNL TOTAL CA: CPT | Performed by: STUDENT IN AN ORGANIZED HEALTH CARE EDUCATION/TRAINING PROGRAM

## 2018-06-30 RX ORDER — HYDROCORTISONE 10 MG/1
10 TABLET ORAL DAILY
Status: DISCONTINUED | OUTPATIENT
Start: 2018-07-01 | End: 2018-07-01 | Stop reason: HOSPADM

## 2018-06-30 RX ORDER — ACETAMINOPHEN 325 MG/1
650 TABLET ORAL EVERY 4 HOURS PRN
Status: DISCONTINUED | OUTPATIENT
Start: 2018-06-30 | End: 2018-07-01 | Stop reason: HOSPADM

## 2018-06-30 RX ORDER — PROCHLORPERAZINE 25 MG
25 SUPPOSITORY, RECTAL RECTAL EVERY 12 HOURS PRN
Status: DISCONTINUED | OUTPATIENT
Start: 2018-06-30 | End: 2018-07-01 | Stop reason: HOSPADM

## 2018-06-30 RX ORDER — ONDANSETRON 2 MG/ML
4 INJECTION INTRAMUSCULAR; INTRAVENOUS EVERY 6 HOURS PRN
Status: DISCONTINUED | OUTPATIENT
Start: 2018-06-30 | End: 2018-07-01 | Stop reason: HOSPADM

## 2018-06-30 RX ORDER — HYDROMORPHONE HCL/0.9% NACL/PF 0.2MG/0.2
0.2 SYRINGE (ML) INTRAVENOUS
Status: DISCONTINUED | OUTPATIENT
Start: 2018-06-30 | End: 2018-06-30

## 2018-06-30 RX ORDER — LORAZEPAM 2 MG/ML
0.5 INJECTION INTRAMUSCULAR EVERY 4 HOURS PRN
Status: DISCONTINUED | OUTPATIENT
Start: 2018-06-30 | End: 2018-07-01 | Stop reason: HOSPADM

## 2018-06-30 RX ORDER — PROCHLORPERAZINE MALEATE 5 MG
10 TABLET ORAL EVERY 6 HOURS PRN
Status: DISCONTINUED | OUTPATIENT
Start: 2018-06-30 | End: 2018-07-01 | Stop reason: HOSPADM

## 2018-06-30 RX ORDER — BUPROPION HYDROCHLORIDE 100 MG/1
100 TABLET ORAL 2 TIMES DAILY
Status: DISCONTINUED | OUTPATIENT
Start: 2018-06-30 | End: 2018-07-01 | Stop reason: HOSPADM

## 2018-06-30 RX ORDER — HYDROCORTISONE 10 MG/1
20 TABLET ORAL EVERY MORNING
Status: DISCONTINUED | OUTPATIENT
Start: 2018-06-30 | End: 2018-06-30 | Stop reason: HOSPADM

## 2018-06-30 RX ORDER — HYDROXYZINE HYDROCHLORIDE 10 MG/1
10 TABLET, FILM COATED ORAL 3 TIMES DAILY PRN
Status: DISCONTINUED | OUTPATIENT
Start: 2018-06-30 | End: 2018-07-01 | Stop reason: HOSPADM

## 2018-06-30 RX ORDER — NALOXONE HYDROCHLORIDE 0.4 MG/ML
.1-.4 INJECTION, SOLUTION INTRAMUSCULAR; INTRAVENOUS; SUBCUTANEOUS
Status: DISCONTINUED | OUTPATIENT
Start: 2018-06-30 | End: 2018-07-01 | Stop reason: HOSPADM

## 2018-06-30 RX ORDER — ONDANSETRON 4 MG/1
4 TABLET, ORALLY DISINTEGRATING ORAL EVERY 6 HOURS PRN
Status: DISCONTINUED | OUTPATIENT
Start: 2018-06-30 | End: 2018-07-01 | Stop reason: HOSPADM

## 2018-06-30 RX ORDER — LIDOCAINE 4 G/G
2 PATCH TOPICAL EVERY 24 HOURS
Status: DISCONTINUED | OUTPATIENT
Start: 2018-06-30 | End: 2018-07-01 | Stop reason: HOSPADM

## 2018-06-30 RX ORDER — HYDROCORTISONE 10 MG/1
20 TABLET ORAL EVERY MORNING
Status: DISCONTINUED | OUTPATIENT
Start: 2018-07-01 | End: 2018-07-01 | Stop reason: HOSPADM

## 2018-06-30 RX ORDER — SODIUM CHLORIDE 9 MG/ML
INJECTION, SOLUTION INTRAVENOUS CONTINUOUS
Status: DISCONTINUED | OUTPATIENT
Start: 2018-06-30 | End: 2018-07-01

## 2018-06-30 RX ORDER — OXYCODONE HYDROCHLORIDE 5 MG/1
5-10 TABLET ORAL
Status: DISCONTINUED | OUTPATIENT
Start: 2018-06-30 | End: 2018-07-01 | Stop reason: HOSPADM

## 2018-06-30 RX ORDER — AMOXICILLIN 250 MG
1 CAPSULE ORAL 2 TIMES DAILY
Status: DISCONTINUED | OUTPATIENT
Start: 2018-06-30 | End: 2018-07-01 | Stop reason: HOSPADM

## 2018-06-30 RX ORDER — HYDROMORPHONE HYDROCHLORIDE 1 MG/ML
.3-.5 INJECTION, SOLUTION INTRAMUSCULAR; INTRAVENOUS; SUBCUTANEOUS
Status: DISCONTINUED | OUTPATIENT
Start: 2018-06-30 | End: 2018-07-01 | Stop reason: HOSPADM

## 2018-06-30 RX ORDER — HYDROCORTISONE 10 MG/1
10 TABLET ORAL DAILY
Status: DISCONTINUED | OUTPATIENT
Start: 2019-07-01 | End: 2018-06-30 | Stop reason: CLARIF

## 2018-06-30 RX ADMIN — ACETAMINOPHEN 650 MG: 325 TABLET, FILM COATED ORAL at 14:51

## 2018-06-30 RX ADMIN — FLUOXETINE 20 MG: 20 CAPSULE ORAL at 09:59

## 2018-06-30 RX ADMIN — VANCOMYCIN HYDROCHLORIDE 1500 MG: 10 INJECTION, POWDER, LYOPHILIZED, FOR SOLUTION INTRAVENOUS at 13:16

## 2018-06-30 RX ADMIN — POLYETHYLENE GLYCOL 3350 17 G: 17 POWDER, FOR SOLUTION ORAL at 10:05

## 2018-06-30 RX ADMIN — OXYCODONE HYDROCHLORIDE 10 MG: 5 TABLET ORAL at 00:27

## 2018-06-30 RX ADMIN — OXYCODONE HYDROCHLORIDE 10 MG: 5 TABLET ORAL at 03:53

## 2018-06-30 RX ADMIN — TAZOBACTAM SODIUM AND PIPERACILLIN SODIUM 3.38 G: 375; 3 INJECTION, SOLUTION INTRAVENOUS at 06:49

## 2018-06-30 RX ADMIN — OXYCODONE HYDROCHLORIDE 10 MG: 5 TABLET ORAL at 17:48

## 2018-06-30 RX ADMIN — HYDROCORTISONE 10 MG: 10 TABLET ORAL at 14:09

## 2018-06-30 RX ADMIN — TAZOBACTAM SODIUM AND PIPERACILLIN SODIUM 3.38 G: 375; 3 INJECTION, SOLUTION INTRAVENOUS at 12:31

## 2018-06-30 RX ADMIN — HYDROCORTISONE 20 MG: 10 TABLET ORAL at 10:02

## 2018-06-30 RX ADMIN — LORAZEPAM 0.5 MG: 0.5 TABLET ORAL at 08:49

## 2018-06-30 RX ADMIN — LORAZEPAM 0.5 MG: 0.5 TABLET ORAL at 03:56

## 2018-06-30 RX ADMIN — OXYCODONE HYDROCHLORIDE 10 MG: 5 TABLET ORAL at 12:30

## 2018-06-30 RX ADMIN — LORAZEPAM 0.5 MG: 2 INJECTION INTRAMUSCULAR; INTRAVENOUS at 20:20

## 2018-06-30 RX ADMIN — TAZOBACTAM SODIUM AND PIPERACILLIN SODIUM 3.38 G: 375; 3 INJECTION, SOLUTION INTRAVENOUS at 00:28

## 2018-06-30 RX ADMIN — LORAZEPAM 0.5 MG: 0.5 TABLET ORAL at 14:09

## 2018-06-30 RX ADMIN — OXYCODONE HYDROCHLORIDE 10 MG: 5 TABLET ORAL at 23:25

## 2018-06-30 RX ADMIN — ACETAMINOPHEN 650 MG: 325 TABLET, FILM COATED ORAL at 17:48

## 2018-06-30 RX ADMIN — OXYCODONE HYDROCHLORIDE 10 MG: 5 TABLET ORAL at 08:49

## 2018-06-30 RX ADMIN — ACETAMINOPHEN 650 MG: 325 TABLET, FILM COATED ORAL at 03:53

## 2018-06-30 RX ADMIN — BUPROPION HYDROCHLORIDE 100 MG: 100 TABLET, FILM COATED ORAL at 10:00

## 2018-06-30 RX ADMIN — SENNOSIDES AND DOCUSATE SODIUM 1 TABLET: 8.6; 5 TABLET ORAL at 20:09

## 2018-06-30 RX ADMIN — ACETAMINOPHEN 650 MG: 325 TABLET, FILM COATED ORAL at 23:25

## 2018-06-30 RX ADMIN — BUPROPION HYDROCHLORIDE 100 MG: 100 TABLET, FILM COATED ORAL at 21:30

## 2018-06-30 RX ADMIN — SODIUM CHLORIDE: 9 INJECTION, SOLUTION INTRAVENOUS at 20:11

## 2018-06-30 ASSESSMENT — ACTIVITIES OF DAILY LIVING (ADL)
DRESS: 0-->INDEPENDENT
SWALLOWING: 0-->SWALLOWS FOODS/LIQUIDS WITHOUT DIFFICULTY
AMBULATION: 0-->INDEPENDENT
TRANSFERRING: 0-->INDEPENDENT
RETIRED_EATING: 0-->INDEPENDENT
COGNITION: 0 - NO COGNITION ISSUES REPORTED
RETIRED_COMMUNICATION: 0-->UNDERSTANDS/COMMUNICATES WITHOUT DIFFICULTY
TOILETING: 0-->INDEPENDENT
BATHING: 0-->INDEPENDENT
FALL_HISTORY_WITHIN_LAST_SIX_MONTHS: NO

## 2018-06-30 ASSESSMENT — PAIN DESCRIPTION - DESCRIPTORS
DESCRIPTORS: ACHING
DESCRIPTORS: ACHING

## 2018-06-30 NOTE — PLAN OF CARE
Problem: Patient Care Overview  Goal: Plan of Care/Patient Progress Review  Report called to [facility]. Will arrange transportation for an approx arrival time of 1545. Patient and family aware of plan.

## 2018-06-30 NOTE — IP AVS SNAPSHOT
MRN:[id number]                      After Visit Summary   6/30/2018    Suzy Rodríguez    MRN: [id number]           Thank you!     Thank you for choosing [facility] for your care. Our goal is always to provide you with excellent care. Hearing back from our patients is one way we can continue to improve our services. Please take a few minutes to complete the written survey that you may receive in the mail after you visit with us. Thank you!        Patient Information     Date Of Birth          1985        Designated Caregiver       Most Recent Value    Caregiver    Will someone help with your care after discharge? yes    Name of designated caregiver Ashvin    Phone number of caregiver see e-file    Caregiver address same      About your hospital stay     You were admitted on:  June 30, 2018 You last received care in the:  [address] [facility]    You were discharged on:  July 1, 2018        Reason for your hospital stay       Concern for post-operative bleeding                  Who to Call     For medical emergencies, please call 911.  For non-urgent questions about your medical care, please call your primary care provider or clinic, [phone]          Attending Provider     Provider Specialty    Jessica Villaseñor MD Urology       Primary Care Provider Office Phone # Fax #    [facility] [phone]      After Care Instructions     Activity       Your activity upon discharge: See discharge orders            Diet       Follow this diet upon discharge: Regular            Discharge Instructions       May refer to previous AVSS but in summary:     Activity  - No strenuous exercise for 4-6 weeks from date of surgery (DOS)  - No lifting, pushing, pulling more than 10 pounds for 4-6 weeks from DOS.   - Do not strain with bowel movements.  - Do not drive until you can press the brake pedal quickly and fully without pain.   - Do not operate a motor vehicle while  "taking narcotic pain medications.     Incisions  - You may shower and get incisions wet starting 48 hrs after surgery.  - Do not scrub incisions or submerge wounds for 2 weeks or until seen in follow-up.   - Remove wound dressing 48 hours after surgery.   - If purple dermabond glue was used, avoid applying any lotions or ointments.   - If steri-strips were used, they will fall off on their own.   - Leave incision open to air. Cover with gauze only if needed for comfort or to protect clothing from drainage.   - Staples to be removed in clinic    Medications  - Transition from narcotic pain medications to tylenol (acetaminophen) as you are able.  Wean yourself off all pain medications as you are able.  - Some pain medications contain both tylenol (acetaminophen) and a narcotic (Norco, vicodin, percocet), do not take more than 4,000mg of Tylenol (acetaminophen) from all sources in any 24 hour period.  - Narcotics can make you constipated.  Take over the counter fiber (metamucil or benefiber) and stool softeners (miralax, docusate or senna) while taking narcotic pain medications, but stop if you develop diarrhea.  - No driving or operating machinery while taking narcotic pain medications     Anti-coagulation:    - May resume Lovenox as previously prescribed in TWO days       Follow-Up:  - Call your primary care provider to touch base regarding your recent admission.    - Call or return sooner than your regularly scheduled visit if you develop any of the following: fever (greater than 101.5), uncontrolled pain, uncontrolled nausea or vomiting, as well as increased redness, swelling, or drainage from your wound.     Phone numbers:   - Monday through Friday 8am to 4:30pm: Call [phone] with questions or to schedule or confirm appointment.    - Nights or weekends: call the after hours emergency pager - [phone] and tell the  \"I would like to page the Urology Resident on call.\"  - For emergencies, call 911   "                Follow-up Appointments     Follow Up and recommended labs and tests       Follow up with Dr. Mansfield as previously scheduled.     Please arrange follow up with your primary care provider within the week to have your hemoglobin (blood count) rechecked given this recent admission.                  Your next 10 appointments already scheduled     Jul 12, 2018  7:40 AM CDT   (Arrive by 7:25 AM)   Post-Op with Warren Mansfield MD   [facility] ([facility])    [address]  [address] [facility] [id number] [phone]            Jul 25, 2018 11:20 AM CDT   (Arrive by 11:05 AM)   RETURN ENDOCRINE with Roel Beverly MD   [facility] ([facility])    [address] [place] [address]  3rd [facility] [id number] [phone]              Additional Information     If you use hormonal birth control (such as the pill, patch, ring or implants): You'll need a second form of birth control for 7 days (condoms, a diaphragm or contraceptive foam). While in the hospital, you received a medicine called Bridion. Your normal birth control will not work as well for a week after taking this medicine.          Pending Results     Date and Time Order Name Status Description    6/29/2018 1320 Blood culture Preliminary     6/29/2018 1320 Blood culture Preliminary             Statement of Approval     Ordered          07/01/18 1400  I have reviewed and agree with all the recommendations and orders detailed in this document.  EFFECTIVE NOW     Approved and electronically signed by:  Marin Pride MD             Admission Information     Date & Time Provider Department Dept. Phone    6/30/2018 Jessica Villaseñor MD [address] [facility] [phone]      Your Vitals Were     Blood Pressure Temperature Respirations Weight Pulse Oximetry BMI (Body Mass Index)    106/56 (BP Location: Right  arm) 97.1  F (36.2  C) (Oral) 16 75.9 kg (167 lb 4.8 oz) 97% 28.72 kg/m2      [facility] Information     [facility] gives you secure access to your electronic health record. If you see a primary care provider, you can also send messages to your care team and make appointments. If you have questions, please call your primary care clinic.  If you do not have a primary care provider, please call [phone] and they will assist you.        Care EveryWhere ID     This is your Care EveryWhere ID. This could be used by other organizations to access your [facility] medical records  [id number]        Equal Access to Services     MARQUITA VALADEZ : Maury Redding, marcela siddiqi, rod koehler, coleman young. So [facility] [phone].    ATENCIÓN: Si habla español, tiene a lyman disposición servicios gratuitos de asistencia lingüística. [facility] [phone].    We comply with applicable federal civil rights laws and [place] laws. We do not discriminate on the basis of race, color, national origin, age, disability, sex, sexual orientation, or gender identity.               Review of your medicines      CONTINUE these medicines which have NOT CHANGED        Dose / Directions    acetaminophen 325 MG tablet   Commonly known as:  TYLENOL   Used for:  Adrenal mass, right (H), Acute post-operative pain        Dose:  650 mg   Take 2 tablets (650 mg) by mouth every 4 hours as needed for other (multimodal surgical pain management along with NSAIDS and opioid medication as indicated based on pain control and physical function.)   Quantity:  150 tablet   Refills:  0       buPROPion 100 MG tablet   Commonly known as:  WELLBUTRIN        TAKE 1 TABLET (100 MG TOTAL) BY MOUTH 2 (TWO) TIMES A DAY.   Refills:  3       calcium carbonate antacid 1000 MG Chew        Dose:  1 tablet   Take 1 tablet by mouth every morning   Refills:  0       enoxaparin 40 MG/0.4ML injection   Commonly known as:  LOVENOX    Used for:  Adrenal mass, right (H), H/O MTHFR mutation        Dose:  40 mg   Inject 0.4 mLs (40 mg) Subcutaneous daily   Quantity:  14 Syringe   Refills:  0       EPINEPHrine 0.3 MG/0.3ML injection 2-pack   Commonly known as:  EPIPEN/ADRENACLICK/or ANY BX GENERIC EQUIV        As directed for bee stings   Refills:  0       FLUoxetine 20 MG capsule   Commonly known as:  PROzac        Dose:  20 mg   Take 20 mg by mouth every morning   Refills:  0       * hydrocortisone 20 MG tablet   Commonly known as:  CORTEF   Used for:  Adrenal mass, right (H)        Dose:  20 mg   Take 1 tablet (20 mg) by mouth every morning (then 10mg at 2pm) until followup with Endocrinology   Quantity:  30 tablet   Refills:  1       * hydrocortisone 10 MG tablet   Commonly known as:  CORTEF   Used for:  Adrenal mass, right (H)        Dose:  10 mg   Take 1 tablet (10 mg) by mouth daily At 2pm until follow up with Endocrinology (total: 30mg daily)   Quantity:  30 tablet   Refills:  1       Lidocaine 4 % Patch   Commonly known as:  LIDOCARE   Used for:  Adrenal mass, right (H), Acute post-operative pain        Dose:  2 patch   Place 2 patches onto the skin every 24 hours   Quantity:  14 patch   Refills:  0       LORazepam 0.5 MG tablet   Commonly known as:  ATIVAN   Used for:  Adrenal mass (H), Anxiety        Dose:  0.5 mg   Take 1 tablet (0.5 mg) by mouth every 8 hours as needed for anxiety   Quantity:  30 tablet   Refills:  0       ONE-A-DAY WOMENS PO        Dose:  2 tablet   Take 2 tablets by mouth every morning   Refills:  0       oxyCODONE IR 10 MG tablet   Commonly known as:  ROXICODONE   Used for:  Adrenal mass, right (H), Acute post-operative pain        Dose:  5-10 mg   Take 0.5-1 tablets (5-10 mg) by mouth every 4 hours as needed for moderate to severe pain   Quantity:  22 tablet   Refills:  0       senna-docusate 8.6-50 MG per tablet   Commonly known as:  SENOKOT-S;PERICOLACE   Used for:  Acute post-operative pain        Dose:  1  tablet   Take 1 tablet by mouth 2 times daily To prevent constipation   Quantity:  60 tablet   Refills:  0       VITAMIN D-1000 MAX ST 1000 units Tabs   Generic drug:  cholecalciferol        Dose:  2000 Units   Take 2,000 Units by mouth every morning   Refills:  0       * Notice:  This list has 2 medication(s) that are the same as other medications prescribed for you. Read the directions carefully, and ask your doctor or other care provider to review them with you.             Protect others around you: Learn how to safely use, store and throw away your medicines at www.disposemymeds.org.             Medication List: This is a list of all your medications and when to take them. Check marks below indicate your daily home schedule. Keep this list as a reference.      Medications           Morning Afternoon Evening Bedtime As Needed    acetaminophen 325 MG tablet   Commonly known as:  TYLENOL   Take 2 tablets (650 mg) by mouth every 4 hours as needed for other (multimodal surgical pain management along with NSAIDS and opioid medication as indicated based on pain control and physical function.)   Last time this was given:  650 mg on 7/1/2018 10:18 AM                                buPROPion 100 MG tablet   Commonly known as:  WELLBUTRIN   TAKE 1 TABLET (100 MG TOTAL) BY MOUTH 2 (TWO) TIMES A DAY.   Last time this was given:  100 mg on 7/1/2018  8:46 AM                                calcium carbonate antacid 1000 MG Chew   Take 1 tablet by mouth every morning                                enoxaparin 40 MG/0.4ML injection   Commonly known as:  LOVENOX   Inject 0.4 mLs (40 mg) Subcutaneous daily                                EPINEPHrine 0.3 MG/0.3ML injection 2-pack   Commonly known as:  EPIPEN/ADRENACLICK/or ANY BX GENERIC EQUIV   As directed for bee stings                                FLUoxetine 20 MG capsule   Commonly known as:  PROzac   Take 20 mg by mouth every morning   Last time this was given:  20 mg on  7/1/2018  8:46 AM                                * hydrocortisone 20 MG tablet   Commonly known as:  CORTEF   Take 1 tablet (20 mg) by mouth every morning (then 10mg at 2pm) until followup with Endocrinology   Last time this was given:  10 mg on 7/1/2018  1:14 PM                                * hydrocortisone 10 MG tablet   Commonly known as:  CORTEF   Take 1 tablet (10 mg) by mouth daily At 2pm until follow up with Endocrinology (total: 30mg daily)   Last time this was given:  10 mg on 7/1/2018  1:14 PM                                Lidocaine 4 % Patch   Commonly known as:  LIDOCARE   Place 2 patches onto the skin every 24 hours   Last time this was given:  1 patch on 7/1/2018 11:41 AM                                LORazepam 0.5 MG tablet   Commonly known as:  ATIVAN   Take 1 tablet (0.5 mg) by mouth every 8 hours as needed for anxiety                                ONE-A-DAY WOMENS PO   Take 2 tablets by mouth every morning                                oxyCODONE IR 10 MG tablet   Commonly known as:  ROXICODONE   Take 0.5-1 tablets (5-10 mg) by mouth every 4 hours as needed for moderate to severe pain   Last time this was given:  10 mg on 7/1/2018 10:18 AM                                senna-docusate 8.6-50 MG per tablet   Commonly known as:  SENOKOT-S;PERICOLACE   Take 1 tablet by mouth 2 times daily To prevent constipation   Last time this was given:  1 tablet on 7/1/2018  8:46 AM                                VITAMIN D-1000 MAX ST 1000 units Tabs   Take 2,000 Units by mouth every morning   Generic drug:  cholecalciferol                                * Notice:  This list has 2 medication(s) that are the same as other medications prescribed for you. Read the directions carefully, and ask your doctor or other care provider to review them with you.

## 2018-06-30 NOTE — DISCHARGE SUMMARY
[facility]    Discharge Summary  Hospital Medicine    Date of Admission:  6/29/2018  Date of Discharge:  6/30/2018   Discharging Provider: Jorge Silverman  Date of Service: 6/30/2018      Primary Care     Clinic, [place] [id number]      Identification and Chief Compaint: Suzy Rodríguez is a 32 year old female who presented on 6/29/2018 with complaint of right lateral and right anterior apical chest pain.    Discharge Diagnoses       Acute post-operative pain    Chest pain    Postoperative anemia due to acute blood loss    Pleural effusion on right    Ascites    Adrenal cortex cancer, right (H)      Discharge Disposition : Transferred to Urology service [facility], Dr. Villaseñor.    Discharge Orders     Full Code     Advance Diet as Tolerated   Follow this diet upon discharge: Orders Placed This Encounter     Advance Diet as Tolerated: Regular Diet Adult          Discharge Medications   Current Discharge Medication List      CONTINUE these medications which have NOT CHANGED    Details   acetaminophen (TYLENOL) 325 MG tablet Take 2 tablets (650 mg) by mouth every 4 hours as needed for other (multimodal surgical pain management along with NSAIDS and opioid medication as indicated based on pain control and physical function.)  Qty: 150 tablet, Refills: 0    Associated Diagnoses: Adrenal mass, right (H); Acute post-operative pain      buPROPion (WELLBUTRIN) 100 MG tablet TAKE 1 TABLET (100 MG TOTAL) BY MOUTH 2 (TWO) TIMES A DAY.  Refills: 3      calcium carbonate antacid 1000 MG CHEW Take 1 tablet by mouth every morning       cholecalciferol (VITAMIN D-1000 MAX ST) 1000 units TABS Take 2,000 Units by mouth every morning       enoxaparin (LOVENOX) 40 MG/0.4ML injection Inject 0.4 mLs (40 mg) Subcutaneous daily  Qty: 14 Syringe, Refills: 0    Associated Diagnoses: Adrenal mass, right (H); H/O MTHFR mutation      EPINEPHrine (EPIPEN/ADRENACLICK/OR ANY  BX GENERIC EQUIV) 0.3 MG/0.3ML injection 2-pack As directed      FLUoxetine (PROZAC) 20 MG capsule Take 20 mg by mouth every morning       !! hydrocortisone (CORTEF) 10 MG tablet Take 1 tablet (10 mg) by mouth daily At 2pm until follow up with Endocrinology (total: 30mg daily)  Qty: 30 tablet, Refills: 1    Associated Diagnoses: Adrenal mass, right (H)      !! hydrocortisone (CORTEF) 20 MG tablet Take 1 tablet (20 mg) by mouth every morning (then 10mg at 2pm) until followup with Endocrinology  Qty: 30 tablet, Refills: 1    Associated Diagnoses: Adrenal mass, right (H)      Lidocaine (LIDOCARE) 4 % Patch Place 2 patches onto the skin every 24 hours  Qty: 14 patch, Refills: 0    Associated Diagnoses: Adrenal mass, right (H); Acute post-operative pain      LORazepam (ATIVAN) 0.5 MG tablet Take 1 tablet (0.5 mg) by mouth every 8 hours as needed for anxiety  Qty: 30 tablet, Refills: 0    Associated Diagnoses: Adrenal mass (H); Anxiety      Multiple Vitamins-Calcium (ONE-A-DAY WOMENS PO) Take 2 tablets by mouth every morning       oxyCODONE IR (ROXICODONE) 10 MG tablet Take 0.5-1 tablets (5-10 mg) by mouth every 4 hours as needed for moderate to severe pain  Qty: 22 tablet, Refills: 0    Associated Diagnoses: Adrenal mass, right (H); Acute post-operative pain      senna-docusate (SENOKOT-S;PERICOLACE) 8.6-50 MG per tablet Take 1 tablet by mouth 2 times daily To prevent constipation  Qty: 60 tablet, Refills: 0    Associated Diagnoses: Acute post-operative pain       !! - Potential duplicate medications found. Please discuss with provider.        Allergies   Allergies   Allergen Reactions     Bee Venom Swelling     Ibuprofen Other (See Comments), Hives and Swelling       Consultations This Hospital Stay   Consultation during this admission received from:    PHARMACY TO DOSE [facility]    Significant Results and Procedures   Procedures    None.    Data   Results for orders placed or performed during the hospital encounter of  06/29/18   CT Chest/Abdomen/Pelvis w Contrast    Narrative    CT CHEST/ABDOMEN/PELVIS WITH CONTRAST  6/29/2018 3:00 PM    HISTORY: Chest pain/abdominal pain with recent right adrenalectomy  with possible diaphragm injury. Rule out pulmonary embolism,  pneumothorax, abdomen/diaphragm hematoma.    TECHNIQUE: CT scan obtained of the chest, abdomen, and pelvis with  oral and IV contrast. 74mL Isovue-370 injected. Radiation dose for  this scan was reduced using automated exposure control, adjustment of  the mA and/or kV according to patient size, or iterative  reconstruction technique.    COMPARISON:  CT chest 6/14/2018. MRI abdomen 5/9/2018.    FINDINGS:  Chest: No acute thoracic aortic abnormality. No evidence for pulmonary  embolism. No enlarged lymph nodes identified. Small right pleural  effusion newly identified. New finding of small right pneumothorax.  Small bubble of gas at the inferior pericardial fat and anterior to  the midline esophagus series 4 image 88. There is moderate right lower  lobe base atelectasis. Left lung appears clear of any acute  abnormality. Previously noted lingular pulmonary nodule is difficult  to visualize currently. Previously noted small left lower lobe nodule  is also very difficult to correlate on the current examination.    Abdomen/pelvis: The right adrenal mass has been resected. There are  scattered bubbles of extraluminal gas at the adrenalectomy site and at  other locations at the upper abdomen. There is a somewhat ill-defined  lobulated an irregular appearance of the posterior right hemidiaphragm  on series 9 image 15 that is immediately superior to the operative  region. Also see coronal series 10 image 98. There is a locule of  fluid at the adrenalectomy site measuring 6.4 cm image 24. There is  small ascites adjacent to the right and posterior liver. A more  prominent collection of gas and some internal fluid versus other  postoperative material lying immediately posterior  to the right liver  is 6.2 x 3.3 cm series 9 image 40.    A nodular lesion measuring 2.8 cm is stable at the inferior right  liver with peripheral nodular enhancement series 9 image 50. The prior  MRI imaging suggests a potential hemangioma. No acute hepatic  abnormality. Gallbladder, left adrenal, spleen, pancreas, and kidneys  show no acute abnormalities otherwise. Nonobstructing stone at the  right kidney. 0.2 cm series 9 image 35. No hydronephrosis.    No acute bowel abnormality. No bowel obstruction is seen. Prominent  stool at the proximal colon. Pelvis shows no fluid collections. No  convincing adenopathy.      Impression    IMPRESSION:  1. Right adrenalectomy. There is subtle irregularity along the  posterior right inferior hemidiaphragm near the surgical site that is  at least moderately suspicious for potential focal diaphragmatic  injury in this area. There is small right pneumothorax and small gas  at the most inferior mediastinum that may relate to this finding.  2. Focal loculated fluid and collections of extraluminal gas at the  adrenalectomy site noted as above. Small ascites also noted.  3. Small right pleural effusion. Moderate right lung base atelectasis.  4. No other acute abnormality is identified. No pulmonary embolism or  acute abnormality.  5. Previously noted small pulmonary nodules are difficult to visualize  on this examination, but surveillance imaging should be considered on  the basis of the prior CT.  6. Small nonobstructing stone within the right kidney.    VALENTINE OLIVEIRA MD       History of Present Illness   (From H&P) This patient is a 32 year old  female with a significant past medical history of PCOD, recently dx R adrenal mass and surgery 6/25 who presents with R chest pain/ shoulder pain. She had it since surgery but was worse last evening. It hurt to breath. She notes no urinary tract symptoms voiding normally.  No frequency urgency or dysuria.  She states that she was  told at the time of the surgery that the diaphragm was nicked. Denies hemoptesis, hemetemesis. Has had cough last couple days. No fever/chills.    Hospital Course   Suzy Rodríguez was admitted on 6/29/2018.  The following problems were addressed during her hospitalization:    Principal Problem:    Acute post-operative pain - is S/P laparotomy for resection of adrenal mass on 6/25/18, Dr. Mansfield.  Operation was complicated by surgical diaphragmatic injury which required two layer repair.  Patient now admitted with pain over right lower lateral thorax radiating to the right anterior apex, pleuritic, beginning 6/29/18.  CT chest 6/29/18 shows no PE, but does show left pleural effusion new from 6/26/18, which I think is the source of her pain.  This effusion, combined with declining Hgb (see below) raises concern about hemothorax and possible ongoing bleeding.  - Discussed above with patient and .  - Discussed case with Dr. Ac, Surgery, and Dr. Villaseñor, Urology, at [facility].  They agree to accept patient in transfer to the urology service.  Discussed with patient and her ; they are in agreement with transfer.     Active Problems:       Postoperative anemia due to acute blood loss - pre-op Hgb 14.4, post-op 10.9, admission Hgb here 8.3, Hgb this morning 7.1.  Raises concern about ongoing bleeding into right chest and/or abdomen.  - Will follow Hgb.  - Given downward trend in Hgb, I would favor transfusion 1 unit today at Hgb 7.1.  Given plans for transfer this morning, will defer to urology team.       Pleural effusion on right -small/moderate on CT chest 6/29/18, new from 6/26/18 CXR.  Concern for hemothorax given pain and declining Hgb.  - Advised patient that this is probably going to need to be sampled/drained.  We don't have the ability to do that here at [facility].  Transferring patient today to [facility] as above.       Ascites - majority is perihepatic on 6/29/18 chest CT.  With declining Hgb, could be  hemorrhagic, and may be the source of the right pleural effusion.  - Transferring patient today to [facility] as above.       Adrenal cortex cancer, right (H) - resected 6/25/18 at [facility], Dr. Mansfield.  Path is back confirming diagnosis of cancer.  - Discussed with patient; she is aware of cancer diagnosis.       Pending Results   Unresulted Labs Ordered in the Past 30 Days of this Admission     Date and Time Order Name Status Description    6/29/2018 1326 Urine Culture Aerobic Bacterial Preliminary     6/29/2018 1320 Blood culture Preliminary     6/29/2018 1320 Blood culture Preliminary           Physical Exam   Temp:  [97.9  F (36.6  C)-100.8  F (38.2  C)] 98.7  F (37.1  C)  Pulse:  [] 90  Heart Rate:  [] 96  Resp:  [14-24] 16  BP: ()/(63-98) 136/65  SpO2:  [93 %-100 %] 100 %  Vitals:    06/29/18 1824   Weight: 78 kg (171 lb 15.3 oz)       GENERAL: Pleasant woman whom I woke up on my visit.  Looks comfortable but worried.  EYES: Eyes grossly normal to inspection, extraocular movements intact  HENT: Nares patent bilaterally.  Nasal mucosa normal, no discharge.   NECK: Trachea midline, no stridor.    RESP: No accessory muscle use.  Focally diminished breath sounds right base.  Lungs clear otherwise on inspiration and expiration.  Expiration not prolonged, no wheeze.  CV: Regular rate and rhythm, non-tachycardic.  Normal S1 S2, no murmur or extra sound.  No lower extremity edema.  ABDOMEN: Midline incision is intact with staples in place.  Mildly protuberant, soft.  Mildly tender to palpation around incision, no guarding.  Liver and spleen not palpable, no masses palpable.  Bowel sounds positive.  MS: No bony deformities noted.  No red or inflamed joints.  SKIN: Warm and dry, no rashes where skin visible.  NEURO: Alert, oriented, conversant.  Cranial nerves II - XII grossly intact.  No gross motor or sensory deficits.    PSYCH: Calm, alert, conversant.  Able to articulate logical thoughts, no tangential  thoughts, no hallucinations or delusions.  Affect: a bit flat, looks nervous, worried.    The discharge plan was discussed with the patient and her .    Total time on this discharge was 65 minutes, 45 of which were spent in care coordination and counseling.    Jorge Silverman MD

## 2018-06-30 NOTE — PLAN OF CARE
"Problem: Pain, Acute (Adult)  Goal: Acceptable Pain Control/Comfort Level  Patient will demonstrate the desired outcomes by discharge/transition of care.   Outcome: Improving  Pt alert, oriented, independent. Prn dilaudid, oxycodone and ativan given for pain and anxiety with relief. IV vanco, zosyn and levaquin given. K+ replaced overnight. Recheck 3.8. Notified Dr. Shaw of Hgb 7.1 no new orders at this time. Dual skin assessment completed.  stayed with pt overnight. Abdominal binder in place. Incision is CDI. Pt rested comfortably overnight. Denies nausea, SOB. /67 (BP Location: Right arm)  Pulse 90  Temp 97.9  F (36.6  C) (Oral)  Resp 18  Ht 1.626 m (5' 4\")  Wt 78 kg (171 lb 15.3 oz)  SpO2 95%  BMI 29.52 kg/m2        "

## 2018-06-30 NOTE — PLAN OF CARE
Problem: Patient Care Overview  Goal: Plan of Care/Patient Progress Review  Transfer/Discharge Note  Data:   Reason for Transport:  Higher level of care for abd pain, gi services    Suzy Rodríguez was transferred to [facility] via basic life support (BLS) [id number]  .  Patient was accompanied by Emergency Medical Services. Equipment used for transport: None. Family was aware of reason for transport: yes. All belongings sent with Patient and spouse.    Action:  Report: given to Jacklynilicholo who verbalized understanding of transfer.      Response:  Patient's condition when transferred was stable.    Macy Cruz

## 2018-06-30 NOTE — IP AVS SNAPSHOT
[address] [address]    Phone:  [phone]                                       After Visit Summary   6/30/2018    Suzy Rodríguez    MRN: [id number]           After Visit Summary Signature Page     I have received my discharge instructions, and my questions have been answered. I have discussed any challenges I see with this plan with the nurse or doctor.    ..........................................................................................................................................  Patient/Patient Representative Signature      ..........................................................................................................................................  Patient Representative Print Name and Relationship to Patient    ..................................................               ................................................  Date                                            Time    ..........................................................................................................................................  Reviewed by Signature/Title    ...................................................              ..............................................  Date                                                            Time

## 2018-06-30 NOTE — H&P
Urology Admission History and Physical    Name: Suzy Rodríguez    MRN: [id number]   YOB: 1985                 Chief Complaint:   RUQ pain          History of Present Illness:   Suzy Rodríguez is a 32 year old female who underwent right open adrenalectomy for a large right functional adrenal mass (~10 cm) with Dr. Mansfield on 6/25/2018. Per op note, the procedure involved hepatic mobilization by the transplant surgery team and diaphragmatic injury during the mobilization repaired in two layers with a negative leak test, with decision to not leave a chest tube. She recovered well post op and was discharged home on POD#3 with lovenox and appropriate steroid regimen for her functional mass. She then developed sudden RUQ pain while getting out of bed that she initially suspected was a muscle spasm but it persisted with deep aching quality afterwards, without n/v/feverschills. She thus presented to an outside ER last night where workup included a CBC noting a Hgb of 7.1 from 8.3 prior, and CT C/A/P noting a fluid collection in the adrenalectomy bed:    IMPRESSION:  1. Right adrenalectomy. There is subtle irregularity along the  posterior right inferior hemidiaphragm near the surgical site that is  at least moderately suspicious for potential focal diaphragmatic  injury in this area. There is small right pneumothorax and small gas  at the most inferior mediastinum that may relate to this finding.  2. Focal loculated fluid and collections of extraluminal gas at the  adrenalectomy site noted as above. Small ascites also noted.  3. Small right pleural effusion. Moderate right lung base atelectasis.  4. No other acute abnormality is identified. No pulmonary embolism or  acute abnormality.  5. Previously noted small pulmonary nodules are difficult to visualize  on this examination, but surveillance imaging should be considered on  the basis of the prior CT.  6. Small nonobstructing stone within the right  kidney.    We were contacted and directly admitted the patient. She endorses the above symptoms.     Of note, her PMH includes gastric bypass in 2016. Her surgical pathology returns as adrenal cortical carcinoma, pT2Nx.           Past Medical History:   Noted for above         Past Surgical History:   Noted for above; remainder reviewed in EMR         Social History:   Non-smoker, social etoh,  no illicit drugs         Family History:   No family history on file.         Allergies:     Allergies   Allergen Reactions     Bee Venom Swelling     Ibuprofen Other (See Comments), Hives and Swelling            Medications:     PTA medications include pain medications, hydrocortisone, lovenox (held now) and lidocaine patch           Review of Systems:      ROS: 10 point ROS neg other than the symptoms noted above in the HPI           Physical Exam:     VS:  T: 98.9    HR: Data Unavailable    BP: 132/67    RR: 16   GEN:  AOx3. In mild distress  LUNGS: Non-labored breathing.   BACK:  No midline or CVA tenderness.  ABD:  Soft, non distended, with tenderness in the RUQ - in the mid clavicular line, under the inferior rib margin, but without appreciable fluctuance or herniation.  Midline incision with staples, c/d/i.   EXT:  Warm, well perfused.  No edema.  SKIN:  Warm.  Dry.  No rashes.  NEURO:  CN grossly intact.            Data:     All laboratory data reviewed:  Hgb drawn on admission is 8 from 7.1 at OSH (pt did not receive a transfusion) with stable and wnl WBC, Plt  BMP also stable and wnl with Cr 0.59 from 0.62 at OSH  OSH UA is unremarkable    Recent Labs  Lab 06/30/18  1735 06/30/18  0345 06/29/18  1310 06/28/18  0734    139 140 142   POTASSIUM 4.1 3.8 3.1* 3.3*   CHLORIDE 102 104 102 103   CO2 28 30 30 30   BUN 6* 6* 9 5*   CR 0.59 0.62 0.59 0.52   * 81 93 83   SHASHA 8.3* 8.2* 8.5 8.4*                  Impression and Plan:     Impression:   Suzy Rodríguez is a 32 year old female who is POD#5 s/p open  right adrenalectomy for a large adrenal mass (functiona, pathology returns as adrenocortical carcinoma pT2Nx), with procedure intraoperative hepatic mobilization with transplant team and diaphragmatic injury repaired with negative leak test, who returns with right upper quadrant pain with workup at OSH noting a Hgb drop (7.1 from 8.2 on POD#3) and a small right pneumothorax with focal small loculated fluid collection at the adrenalectomy bed most c/w the hemostatic agents used during surgery. She was directly admitted to our service and workup is noted for improved Hgb, and stable CBC; CXR is still pending.     Provided CXR is reassuring, we will continue to monitor with appropriate pain control and re-check labs in am.       Plan:    NEURO Pain well controlled on current regimen.  Changes:  None - continuing PTA medications along with PRN IV dilaudid   CV HDS.    PULM Aggressive pulmonary toilet and I/S.  CXR pending   FEN/GI IVF @ 100 cc/hr; NPO      No acute issues, no solis needed   HEME Hgb as above.  Continue to monitor. No transfusions indicated at this time   ID Afebrile, no leukocytosis.     ENDO On steroids for her functional mass, dose per endocrine service   ACTIVITY Up as tolerated    PPx Mechanical, holding lovenox for now   DISPO 7B, pending further workup and repeat labs \       This patient's exam findings, labs, and imaging discussed with urology staff surgeon, Dr. Villaseñor, who developed the treatment plan.    Elysia Velez MD MS  Urology Resident      ADDENDUM 945 pm   CXR read returns  - stable right pleural effusion and ptx from CT C/A/P yesterday. Given patient's stable/improved clinical status and normal O2 requirement, will continue to monitor for now     Impression: Right-sided pleural effusion. This is not substantially  changed compared to the CT from 6/29/2018. Associated right basilar  atelectasis. Tiny right pneumothorax unchanged

## 2018-06-30 NOTE — PROGRESS NOTES
Skin Initial Assessment    This writer admitted this patient and completed a full skin assessment and Bhanu score in the Adult PCS flowsheet. Appropriate interventions initiated as needed.     Secondary skin check completed by LINDA Batista    Skin  Inspection of bony prominences: Full  Skin WDL:  WDL except  Skin Color/Characteristics: bruised (ecchymotic)  Skin Temperature: warm  Skin Moisture: dry  Skin Elasticity: quick return to original state  Skin Integrity: incision(s), multiple bruising to right and left arm, bruising to BLLE shins, scab on L upper shin.     Bhanu Risk Assessment  Sensory Perception: 4-->no impairment  Moisture: 4-->rarely moist  Activity: 3-->walks occasionally  Mobility: 3-->slightly limited  Nutrition: 3-->adequate  Friction and Shear: 3-->no apparent problem  Bhanu Score: 20    Fannie Evangelista

## 2018-07-01 ENCOUNTER — RECORDS - HEALTHEAST (OUTPATIENT)
Dept: ADMINISTRATIVE | Facility: OTHER | Age: 33
End: 2018-07-01

## 2018-07-01 VITALS
DIASTOLIC BLOOD PRESSURE: 56 MMHG | WEIGHT: 167.3 LBS | BODY MASS INDEX: 28.72 KG/M2 | TEMPERATURE: 97.1 F | SYSTOLIC BLOOD PRESSURE: 106 MMHG | RESPIRATION RATE: 16 BRPM | OXYGEN SATURATION: 97 %

## 2018-07-01 LAB
ALBUMIN SERPL-MCNC: 2.3 G/DL (ref 3.4–5)
ALP SERPL-CCNC: 90 U/L (ref 40–150)
ALT SERPL W P-5'-P-CCNC: 91 U/L (ref 0–50)
ANION GAP SERPL CALCULATED.3IONS-SCNC: 6 MMOL/L (ref 3–14)
AST SERPL W P-5'-P-CCNC: 21 U/L (ref 0–45)
BILIRUB SERPL-MCNC: 0.5 MG/DL (ref 0.2–1.3)
BUN SERPL-MCNC: 6 MG/DL (ref 7–30)
CALCIUM SERPL-MCNC: 8.2 MG/DL (ref 8.5–10.1)
CHLORIDE SERPL-SCNC: 105 MMOL/L (ref 94–109)
CO2 SERPL-SCNC: 28 MMOL/L (ref 20–32)
CREAT SERPL-MCNC: 0.54 MG/DL (ref 0.52–1.04)
ERYTHROCYTE [DISTWIDTH] IN BLOOD BY AUTOMATED COUNT: 15.9 % (ref 10–15)
GFR SERPL CREATININE-BSD FRML MDRD: >90 ML/MIN/1.7M2
GLUCOSE SERPL-MCNC: 74 MG/DL (ref 70–99)
HCT VFR BLD AUTO: 22.7 % (ref 35–47)
HGB BLD-MCNC: 7.2 G/DL (ref 11.7–15.7)
HGB BLD-MCNC: 7.3 G/DL (ref 11.7–15.7)
MCH RBC QN AUTO: 29.3 PG (ref 26.5–33)
MCHC RBC AUTO-ENTMCNC: 32.2 G/DL (ref 31.5–36.5)
MCV RBC AUTO: 91 FL (ref 78–100)
PLATELET # BLD AUTO: 247 10E9/L (ref 150–450)
POTASSIUM SERPL-SCNC: 3.7 MMOL/L (ref 3.4–5.3)
PROT SERPL-MCNC: 5.2 G/DL (ref 6.8–8.8)
RBC # BLD AUTO: 2.49 10E12/L (ref 3.8–5.2)
SODIUM SERPL-SCNC: 140 MMOL/L (ref 133–144)
WBC # BLD AUTO: 7 10E9/L (ref 4–11)

## 2018-07-01 PROCEDURE — 80053 COMPREHEN METABOLIC PANEL: CPT | Performed by: UROLOGY

## 2018-07-01 PROCEDURE — 25000128 H RX IP 250 OP 636: Performed by: STUDENT IN AN ORGANIZED HEALTH CARE EDUCATION/TRAINING PROGRAM

## 2018-07-01 PROCEDURE — 25000132 ZZH RX MED GY IP 250 OP 250 PS 637: Performed by: STUDENT IN AN ORGANIZED HEALTH CARE EDUCATION/TRAINING PROGRAM

## 2018-07-01 PROCEDURE — 85018 HEMOGLOBIN: CPT | Performed by: STUDENT IN AN ORGANIZED HEALTH CARE EDUCATION/TRAINING PROGRAM

## 2018-07-01 PROCEDURE — 85027 COMPLETE CBC AUTOMATED: CPT | Performed by: UROLOGY

## 2018-07-01 PROCEDURE — 36415 COLL VENOUS BLD VENIPUNCTURE: CPT | Performed by: UROLOGY

## 2018-07-01 RX ADMIN — ACETAMINOPHEN 650 MG: 325 TABLET, FILM COATED ORAL at 02:25

## 2018-07-01 RX ADMIN — SODIUM CHLORIDE: 9 INJECTION, SOLUTION INTRAVENOUS at 05:19

## 2018-07-01 RX ADMIN — ACETAMINOPHEN 650 MG: 325 TABLET, FILM COATED ORAL at 06:25

## 2018-07-01 RX ADMIN — LORAZEPAM 0.5 MG: 2 INJECTION INTRAMUSCULAR; INTRAVENOUS at 00:55

## 2018-07-01 RX ADMIN — OXYCODONE HYDROCHLORIDE 10 MG: 5 TABLET ORAL at 02:25

## 2018-07-01 RX ADMIN — BUPROPION HYDROCHLORIDE 100 MG: 100 TABLET, FILM COATED ORAL at 08:46

## 2018-07-01 RX ADMIN — FLUOXETINE 20 MG: 20 CAPSULE ORAL at 08:46

## 2018-07-01 RX ADMIN — HYDROCORTISONE 20 MG: 10 TABLET ORAL at 08:46

## 2018-07-01 RX ADMIN — LIDOCAINE 1 PATCH: 560 PATCH PERCUTANEOUS; TOPICAL; TRANSDERMAL at 11:41

## 2018-07-01 RX ADMIN — OXYCODONE HYDROCHLORIDE 10 MG: 5 TABLET ORAL at 10:18

## 2018-07-01 RX ADMIN — SENNOSIDES AND DOCUSATE SODIUM 1 TABLET: 8.6; 5 TABLET ORAL at 08:46

## 2018-07-01 RX ADMIN — ACETAMINOPHEN 650 MG: 325 TABLET, FILM COATED ORAL at 10:18

## 2018-07-01 RX ADMIN — OXYCODONE HYDROCHLORIDE 10 MG: 5 TABLET ORAL at 06:25

## 2018-07-01 RX ADMIN — HYDROCORTISONE 10 MG: 10 TABLET ORAL at 13:14

## 2018-07-01 RX ADMIN — LORAZEPAM 0.5 MG: 2 INJECTION INTRAMUSCULAR; INTRAVENOUS at 11:33

## 2018-07-01 ASSESSMENT — PAIN DESCRIPTION - DESCRIPTORS
DESCRIPTORS: ACHING
DESCRIPTORS: ACHING

## 2018-07-01 NOTE — PLAN OF CARE
Problem: Patient Care Overview  Goal: Plan of Care/Patient Progress Review  Outcome: No Change   06/30/18 2243   OTHER   Plan Of Care Reviewed With patient   Plan of Care Review   Progress no change     Patient Vitals for the past 8 hrs:   Temp Temp src Heart Rate BP Resp   06/30/18 1557 98.9  F (37.2  C) Oral 107 132/67 16      Patient is a new admit who arrived from outside hospital at 16:00 for low hemoglobin of 7.1 and right upper quadrant pain:    -given Oxycodone and Tylenol with relief  -hemoglobin recheck was 8.0  -chest x-ray done  -up independently  -given 0.5 IV Ativan for anxiety with relief  -abdominal incision LADONNA with staples  -NS @ 100 ml/hr  -spouse at the beside  -NPO and wants to eat    Plan: Possible discharge to home tomorrow if pain and hgb improves.

## 2018-07-01 NOTE — PLAN OF CARE
Patient discharged at 1500  via family. Discharge instructions sent with patient. Patient requested ativan refill at [facility] pharmacy. Team paged.

## 2018-07-01 NOTE — PROGRESS NOTES
Urology  Progress Note    No acute events overnight   Pain well controlled   Ambulating   CXR did not demonstrate any progression of pneumothorax        Exam  /56 (BP Location: Right arm)  Temp 97.1  F (36.2  C) (Oral)  Resp 16  Wt 75.9 kg (167 lb 4.8 oz)  SpO2 97%  BMI 28.72 kg/m2  No acute distress  Unlabored breathing  Right sub-costal tenderness, incision CDI with staples, RLQ/LUQ tenderness also noted   Lower extremities non-edematous bilaterally    UOP NR     Labs  WBC 7.0  Hgb 8.0  Cr 0.6      Assessment/Plan  32 year old y/o female POD#5 s/p open right adrenalectomy for a large adrenal mass (final path: adrenocortical carcinoma, pT2Nx) who presented with sub-optimal pain control and Hgb drop at OSH. Imaging at that time demonstrated a small right pneumothorax. Patient transferred to [facility] for further cares.     Neuro: APAP, dilaudid, Oxycodone for pain control  CV: HDS  Pulm: incentive spirometry while awake, repeat CXR did not illustrated worsening pneumothorax   FEN/GI: Currently NPO will advance to regular diet, SLIV  Endo: WALT   :  WALT, good UOP  Heme/ID: Lovenox currently held, last dose 7/29/18. Will determine if needed prior to discharge. Repeat Hgb @ noon was 7.2.  Activity: Up ad william   PPx: SCDs, ambulation.  Dispo: Anticipate DC today with previously scheduled follow up.     Seen and examined with the chief resident. Will discuss with Dr. Villaseñor and Dr. Mansfield.     Marin Pride, PGY-2  Urology Resident     Contacting the Urology Team     Please use the following job codes to reach the Urology Team. Note that you must use an in house phone and that job codes cannot receive text pages.     On weekdays, dial [phone] (or star-star-star 777 on the new [employer] telephones) then [phone] to reach the Adult Urology resident or PA on call    On weekdays, dial [phone] (or star-star-star [phone] on the new [employer] telephones) then [phone] to reach the Pediatric Urology resident    On weeknights and weekends, dial [phone]  (or star-star-star 777 on the new [employer] telephones) then [phone] to reach the Urology resident on call (for both Adult and Pediatrics)

## 2018-07-01 NOTE — PLAN OF CARE
Problem: Patient Care Overview  Goal: Plan of Care/Patient Progress Review  Outcome: No Change  D: s/p right open adrenalectomy     I: Monitored vitals and assessed pt status.       A: A0x4. VSS,  Afebrile. Pain controlled with oxycodone x2. Ativan IV x1 for anxiety. Up to bathroom independently. Midline incision staples c/d/i. Pt using abdominal binder.          Temp:  [98.9  F (37.2  C)] 98.9  F (37.2  C)  Heart Rate:  [] 93  Resp:  [16-18] 18  BP: (127-132)/(67-72) 127/72  SpO2:  [98 %-100 %] 98 %      P: Continue to monitor Pt status and report changes to treatment team.

## 2018-07-01 NOTE — PLAN OF CARE
Assumed care [id number]    Assessment:  Alert and oriented. VSS on RA.   POD5 R adrenal gland removal   Pain: managed with 10 mg oxycodone x1 this shift  Labs: Hgb 7.2 at noon    Skin/Incisions: midline abdominal incision, staples, with abdominal binder, CDI  LDA: PIV removed   Diet: regular, tolerating well  /GI: Voiding well on own.  Mobility: independent    Plan: Plan to discharge this afternoon

## 2018-07-02 ENCOUNTER — CARE COORDINATION (OUTPATIENT)
Dept: CARE COORDINATION | Facility: CLINIC | Age: 33
End: 2018-07-02

## 2018-07-02 NOTE — DISCHARGE SUMMARY
Discharge Summary     Suzy Rodríguez MRN# [id number]   YOB: 1985 Age: 32 year old     Date of Admission:  6/30/2018  Date of Discharge::  7/1/2018  4:26 PM  Admitting Physician:  Jessica Villaseñor MD  Discharge Physician:  Marin Pride MD  Primary Care Physician:         Adrian [facility]          Admission Diagnoses:   post-operative pain  post-operative fluid collection  Anemia          Discharge Diagnosis:   Same as above         Procedures:   None performed        Non-operative procedures:   None performed          Consultations:   VASCULAR ACCESS CARE ADULT IP CONSULT         Imaging Studies:     Results for orders placed or performed during the hospital encounter of 06/30/18   XR Chest 2 Views    Narrative    XR CHEST 2 VW 6/30/2018 7:49 PM    History: chest pain and pl effusion in pt s/p adrenalectomy;     Comparison: CT chest 6/29/2018, radiograph 6/26/2018    Findings: PA and lateral views the chest. The heart size is normal.  Moderate right pleural effusion and associated right basilar  atelectasis. Postoperative changes of right adrenalectomy. Tiny right  pneumothorax unchanged.      Impression    Impression: Right-sided pleural effusion. This is not substantially  changed compared to the CT from 6/29/2018. Associated right basilar  atelectasis. Tiny right pneumothorax unchanged.    I have personally reviewed the examination and initial interpretation  and I agree with the findings.    JM LUI MD            Medications Prior to Admission:     No prescriptions prior to admission.            Discharge Medications:     Discharge Medication List as of 7/1/2018  2:41 PM      CONTINUE these medications which have NOT CHANGED    Details   acetaminophen (TYLENOL) 325 MG tablet Take 2 tablets (650 mg) by mouth every 4 hours as needed for other (multimodal surgical pain management along with NSAIDS and opioid medication as indicated based on  pain control and physical function.), Disp-150 tablet, R-0, Local Print      buPROPion (WELLBUTRIN) 100 MG tablet TAKE 1 TABLET (100 MG TOTAL) BY MOUTH 2 (TWO) TIMES A DAY., R-3, Historical      calcium carbonate antacid 1000 MG CHEW Take 1 tablet by mouth every morning , Historical      cholecalciferol (VITAMIN D-1000 MAX ST) 1000 units TABS Take 2,000 Units by mouth every morning , Historical      enoxaparin (LOVENOX) 40 MG/0.4ML injection Inject 0.4 mLs (40 mg) Subcutaneous daily, Disp-14 Syringe, R-0, E-Prescribe      EPINEPHrine (EPIPEN/ADRENACLICK/OR ANY BX GENERIC EQUIV) 0.3 MG/0.3ML injection 2-pack As directed for bee stings, Historical      FLUoxetine (PROZAC) 20 MG capsule Take 20 mg by mouth every morning , Historical      !! hydrocortisone (CORTEF) 10 MG tablet Take 1 tablet (10 mg) by mouth daily At 2pm until follow up with Endocrinology (total: 30mg daily), Disp-30 tablet, R-1, E-Prescribe      !! hydrocortisone (CORTEF) 20 MG tablet Take 1 tablet (20 mg) by mouth every morning (then 10mg at 2pm) until followup with Endocrinology, Disp-30 tablet, R-1, E-Prescribe      Lidocaine (LIDOCARE) 4 % Patch Place 2 patches onto the skin every 24 hoursDisp-14 patch, [id number]      LORazepam (ATIVAN) 0.5 MG tablet Take 1 tablet (0.5 mg) by mouth every 8 hours as needed for anxiety, Disp-30 tablet, R-0, Local Print      Multiple Vitamins-Calcium (ONE-A-DAY WOMENS PO) Take 2 tablets by mouth every morning , Historical      oxyCODONE IR (ROXICODONE) 10 MG tablet Take 0.5-1 tablets (5-10 mg) by mouth every 4 hours as needed for moderate to severe pain, Disp-22 tablet, R-0, Local Print      senna-docusate (SENOKOT-S;PERICOLACE) 8.6-50 MG per tablet Take 1 tablet by mouth 2 times daily To prevent constipation, Disp-60 tablet, R-0, E-Prescribe       !! - Potential duplicate medications found. Please discuss with provider.                 Brief History of Illness:   33yo F who underwent open R adrenalectomy with   Weight on 6/25/18. Discharged home on POD#3. She then presented to an OSH with RUQ pain where workup revealed Hgb 7.1 and a fluid collection in the R adrenalectomy bed.            Hospital Course:   The patient was transferred from OSH directly to the urology service. Hgb was improved to 8 from 7.1 at OSH (pt did not receive a transfusion). CXR was obtained which demonstrated a stable R pleural effusion and ptx. The patient's clinical status improved and Hgb remained stable.   Patient was discharged home with appropriate contact information, follow-up and instructions as seen below in the discharge paperwork.       Final Pathology Result:   Pending at time of discharge         Discharge Instructions and Follow-Up:     Discharge Procedure Orders  Reason for your hospital stay   Order Comments: Concern for post-operative bleeding     Follow Up and recommended labs and tests   Order Comments: Follow up with Dr. Mansfield as previously scheduled.     Please arrange follow up with your primary care provider within the week to have your hemoglobin (blood count) rechecked given this recent admission.     Activity   Order Comments: Your activity upon discharge: See discharge orders   Order Specific Question Answer Comments   Is discharge order? Yes      Discharge Instructions   Order Comments: May refer to previous AVSS but in summary:     Activity  - No strenuous exercise for 4-6 weeks from date of surgery (DOS)  - No lifting, pushing, pulling more than 10 pounds for 4-6 weeks from DOS.   - Do not strain with bowel movements.  - Do not drive until you can press the brake pedal quickly and fully without pain.   - Do not operate a motor vehicle while taking narcotic pain medications.     Incisions  - You may shower and get incisions wet starting 48 hrs after surgery.  - Do not scrub incisions or submerge wounds for 2 weeks or until seen in follow-up.   - Remove wound dressing 48 hours after surgery.   - If purple dermabond glue  "was used, avoid applying any lotions or ointments.   - If steri-strips were used, they will fall off on their own.   - Leave incision open to air. Cover with gauze only if needed for comfort or to protect clothing from drainage.   - Staples to be removed in clinic    Medications  - Transition from narcotic pain medications to tylenol (acetaminophen) as you are able.  Wean yourself off all pain medications as you are able.  - Some pain medications contain both tylenol (acetaminophen) and a narcotic (Norco, vicodin, percocet), do not take more than 4,000mg of Tylenol (acetaminophen) from all sources in any 24 hour period.  - Narcotics can make you constipated.  Take over the counter fiber (metamucil or benefiber) and stool softeners (miralax, docusate or senna) while taking narcotic pain medications, but stop if you develop diarrhea.  - No driving or operating machinery while taking narcotic pain medications     Anti-coagulation:    - May resume Lovenox as previously prescribed in TWO days       Follow-Up:  - Call your primary care provider to touch base regarding your recent admission.    - Call or return sooner than your regularly scheduled visit if you develop any of the following: fever (greater than 101.5), uncontrolled pain, uncontrolled nausea or vomiting, as well as increased redness, swelling, or drainage from your wound.     Phone numbers:   - Monday through Friday 8am to 4:30pm: Call [phone] with questions or to schedule or confirm appointment.    - Nights or weekends: call the after hours emergency pager - [phone] and tell the  \"I would like to page the Urology Resident on call.\"  - For emergencies, call [phone]     Full Code     Diet   Order Comments: Follow this diet upon discharge: Regular   Order Specific Question Answer Comments   Is discharge order? Yes               Discharge Disposition:   Discharged to Home      Condition at discharge: Good    --    Rani Ponce MD  Urology " Resident    7:50 AM, 7/2/2018

## 2018-07-04 ENCOUNTER — TELEPHONE (OUTPATIENT)
Dept: OTHER | Facility: CLINIC | Age: 33
End: 2018-07-04

## 2018-07-05 ENCOUNTER — COMMUNICATION - HEALTHEAST (OUTPATIENT)
Dept: FAMILY MEDICINE | Facility: CLINIC | Age: 33
End: 2018-07-05

## 2018-07-05 LAB
BACTERIA SPEC CULT: NO GROWTH
BACTERIA SPEC CULT: NO GROWTH
Lab: NORMAL
Lab: NORMAL
SPECIMEN SOURCE: NORMAL
SPECIMEN SOURCE: NORMAL

## 2018-07-06 ENCOUNTER — PRE VISIT (OUTPATIENT)
Dept: UROLOGY | Facility: CLINIC | Age: 33
End: 2018-07-06

## 2018-07-06 ENCOUNTER — OFFICE VISIT - HEALTHEAST (OUTPATIENT)
Dept: FAMILY MEDICINE | Facility: CLINIC | Age: 33
End: 2018-07-06

## 2018-07-06 DIAGNOSIS — G47.00 INSOMNIA: ICD-10-CM

## 2018-07-06 DIAGNOSIS — C74.90: ICD-10-CM

## 2018-07-06 DIAGNOSIS — Z09 HOSPITAL DISCHARGE FOLLOW-UP: ICD-10-CM

## 2018-07-06 DIAGNOSIS — D50.0 ANEMIA, BLOOD LOSS: ICD-10-CM

## 2018-07-06 LAB — HGB BLD-MCNC: 8.4 G/DL (ref 12–16)

## 2018-07-06 ASSESSMENT — MIFFLIN-ST. JEOR: SCORE: 1387.15

## 2018-07-06 NOTE — TELEPHONE ENCOUNTER
Post op.  S/P Adrenalectomy on 6-25-18.  Discuss path results.  Records available in [facility].

## 2018-07-12 ENCOUNTER — RECORDS - HEALTHEAST (OUTPATIENT)
Dept: ADMINISTRATIVE | Facility: OTHER | Age: 33
End: 2018-07-12

## 2018-07-12 ENCOUNTER — PRE VISIT (OUTPATIENT)
Dept: RADIATION ONCOLOGY | Facility: CLINIC | Age: 33
End: 2018-07-12

## 2018-07-12 ENCOUNTER — OFFICE VISIT (OUTPATIENT)
Dept: UROLOGY | Facility: CLINIC | Age: 33
End: 2018-07-12
Payer: COMMERCIAL

## 2018-07-12 VITALS
WEIGHT: 159.2 LBS | DIASTOLIC BLOOD PRESSURE: 81 MMHG | SYSTOLIC BLOOD PRESSURE: 126 MMHG | HEART RATE: 116 BPM | HEIGHT: 64 IN | BODY MASS INDEX: 27.18 KG/M2

## 2018-07-12 DIAGNOSIS — C74.01 CARCINOMA, ADRENAL CORTICAL, RIGHT (H): Primary | ICD-10-CM

## 2018-07-12 DIAGNOSIS — C74.01 CARCINOMA, ADRENAL CORTICAL, RIGHT (H): ICD-10-CM

## 2018-07-12 LAB — DHEA-S SERPL-MCNC: <15 UG/DL (ref 35–430)

## 2018-07-12 ASSESSMENT — PATIENT HEALTH QUESTIONNAIRE - PHQ9
10. IF YOU CHECKED OFF ANY PROBLEMS, HOW DIFFICULT HAVE THESE PROBLEMS MADE IT FOR YOU TO DO YOUR WORK, TAKE CARE OF THINGS AT HOME, OR GET ALONG WITH OTHER PEOPLE: EXTREMELY DIFFICULT
SUM OF ALL RESPONSES TO PHQ QUESTIONS 1-9: 18
SUM OF ALL RESPONSES TO PHQ QUESTIONS 1-9: 18

## 2018-07-12 ASSESSMENT — PAIN SCALES - GENERAL: PAINLEVEL: MILD PAIN (3)

## 2018-07-12 NOTE — PROGRESS NOTES
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  Ki-67 mitotic rate 20%  [id number] (CT chest shows 3 mm nodules)    Stats from the national cancer database suggest 5 year survival is around 40%  [employer] Study Group, numbers suggest 24% 5 year survival for stage 3    DHEA pre surgery was 740    Had an elevated DHEA pre-op    Measured today and it was < 15 so very good response    Patient is a 32 year old  female   Vitals: Blood pressure 126/81, pulse 116, height 1.626 m (5' 4\"), weight 72.2 kg (159 lb 3.2 oz), last menstrual period 06/20/2018, not currently breastfeeding.  General Appearance Adult: Alert, no acute distress, oriented  Incisions are healing well    A/P Adrenal Cortical Carcinoma, with possible positive margin, but no clear intraoperative evidence of a gross margin and the resection was as wide as possible without removing the cava.    Have already discussed at  oncology conf and it was recommended that she visit Medical Oncology and Radiation oncology.    She also has found an endocrinologist in [place] with whom she would like to consult and I encouraged this.    These are extremely rare tumors, and that makes evidence difficult to accumulate.    Will plan to re-image her in approximately 4 weeks.    Warren Mansfield MD  Department of Urology Staff  [facility]    Note, dictation software may have been used for this note and the content was not proofread for accuracy      "

## 2018-07-12 NOTE — MR AVS SNAPSHOT
After Visit Summary   7/12/2018    Suzy Rodríguez    MRN: [id number]           Patient Information     Date Of Birth          1985        Visit Information        Provider Department      7/12/2018 7:40 AM Warren Mansfield MD [facility] Urology and [facility] for Prostate and Urologic Cancers        Today's Diagnoses     Carcinoma, adrenal cortical, right (H)    -  1      Care Instructions    Blood work today.    Schedule appointment with Medical Oncology (Dr. Cinrton).    Schedule appointment with Radiation Oncology.  (Dr. Garcia)    Schedule appointment for PET scan.    Follow up with Dr. Mansfield in one month.    It was a pleasure meeting with you today.  Thank you for allowing me and my team the privilege of caring for you today.  YOU are the reason we are here, and I truly hope we provided you with the excellent service you deserve.  Please let us know if there is anything else we can do for you so that we can be sure you are leaving completely satisfied with your care experience.        Sulma Luke, [facility]          Follow-ups after your visit        Additional Services     RAD ONCOLOGY REFERRAL       Your provider has referred you to: Dr. Garcia, possible positive margin    Please be aware that coverage of these services is subject to the terms and limitations of your health insurance plan.  Call member services at your health plan with any benefit or coverage questions.      Please bring the following with you to your appointment:    (1) Any X-Rays, CTs or MRIs which have been performed.  Contact the facility where they were done to arrange for  prior to your scheduled appointment.    (2) List of current medications   (3) This referral request   (4) Any documents/labs given to you for this referral                  Your next 10 appointments already scheduled     Jul 16, 2018  3:15 PM CDT   (Arrive by 3:00 PM)   New Patient Visit with Benson Cintron MD   [facility] (  [facility])    [address]  [address]  [place] [address]   [phone]            Jul 18, 2018  1:00 PM CDT   CONSULT with Dane Garcia MD   Radiation Oncology Clinic ([facility])    [facility]  1st Floor  [address] [address]   [phone]            Jul 25, 2018 11:20 AM CDT   (Arrive by 11:05 AM)   RETURN ENDOCRINE with Roel Beverly MD   [facility] ([facility])    [address]  3rd Floor  [place] [address]   [phone]            Aug 09, 2018 11:30 AM CDT   (Arrive by 11:00 AM)   PET ONCOLOGY WHOLE BODY with [id number]   [facility] ([facility])    [address] [address]   [phone]           Tell your doctor:   If there is any chance you may be pregnant or if you are breastfeeding.   If you have problems lying in small spaces (claustrophobia). If you do, your doctor may give you medicine to help you relax. If you have diabetes:   Have your exam early in the morning. Your blood glucose will go up as the day goes by.   Your glucose level must be 180 or less at the start of the exam. Please take any oral diabetic medication you need to ensure this blood glucose level is below 180, but no insulin 4 hours prior to the exam   If you are taking insulin in the morning take with breakfast by 6 am and schedule procedure between 12 and 2:15 pm.   If you are taking insulin at night take nightly dose, fast overnight, schedule procedure before 10 am.   If you take insulin both morning and night take morning dose by 6am and schedule procedure between 12 and 2:15 pm.   24 hours before your scan: Don t do any heavy exercise. (No jogging, aerobics or other workouts.) Exercise will make your pictures less accurate.  Patients should eat a low carb, high protein meal 7 hours (or the last meal you eat) before  the scan. Low carb, high protein meals consist of lean meats, seafood, beans, soy, low-fat dairy, eggs, nuts & seeds. 6 hours before your scan:   Stop all food and liquids (except water).   Do not chew gum or suck on mints.   If you need to take medicine with food, you may take it with a few crackers.  Please call your Imaging Department at your exam site with any questions.            Aug 23, 2018  7:20 AM CDT   (Arrive by 7:05 AM)   Return Visit with Warren Mansfield MD   [facility] Urology and [facility] for Prostate and Urologic Cancers ([facility])    [address]  [address] [place] [address] [phone]              Future tests that were ordered for you today     Open Future Orders        Priority Expected Expires Ordered    PET Oncology Whole Body Routine  7/12/2019 7/12/2018    DHEA sulfate Routine  7/13/2019 7/12/2018            Who to contact     Please call your clinic at [phone] to:    Ask questions about your health    Make or cancel appointments    Discuss your medicines    Learn about your test results    Speak to your doctor            Additional Information About Your Visit        [employer] Information     [employer] gives you secure access to your electronic health record. If you see a primary care provider, you can also send messages to your care team and make appointments. If you have questions, please call your primary care clinic.  If you do not have a primary care provider, please call [phone] and they will assist you.      [employer] is an electronic gateway that provides easy, online access to your medical records. With [employer], you can request a clinic appointment, read your test results, renew a prescription or communicate with your care team.     To access your existing account, please contact your [facility] or call [phone] for assistance.        Care EveryWhere ID     This is your Care EveryWhere ID.  "This could be used by other organizations to access your [place] medical records  [id number]        Your Vitals Were     Pulse Height BMI (Body Mass Index)             116 1.626 m (5' 4\") 27.33 kg/m2          Blood Pressure from Last 3 Encounters:   07/12/18 126/81   07/01/18 106/56   06/30/18 136/65    Weight from Last 3 Encounters:   07/12/18 72.2 kg (159 lb 3.2 oz)   06/30/18 75.9 kg (167 lb 4.8 oz)   06/29/18 78 kg (171 lb 15.3 oz)              We Performed the Following     RAD ONCOLOGY REFERRAL          Today's Medication Changes          These changes are accurate as of 7/12/18  9:37 AM.  If you have any questions, ask your nurse or doctor.               Stop taking these medicines if you haven't already. Please contact your care team if you have questions.     senna-docusate 8.6-50 MG per tablet   Commonly known as:  SENOKOT-S;PERICOLACE   Stopped by:  Warren Mansfield MD                    Primary Care Provider Office Phone # Fax #    [facility] [phone]       [address] [place], [place] [id number]        Equal Access to Services     MARQUITA VALADEZ AH: Maury villanuevao Sobree, waaxda luqadaha, qaybta kaalmada adejamieyada, coleman young. So [facility] [phone].    ATENCIÓN: Si habla español, tiene a lyman disposición servicios gratuitos de asistencia lingüística. Rena al [phone].    We comply with applicable federal civil rights laws and [place] laws. We do not discriminate on the basis of race, color, national origin, age, disability, sex, sexual orientation, or gender identity.            Thank you!     Thank you for choosing [facility] UROLOGY AND [facility] FOR PROSTATE AND UROLOGIC CANCERS  for your care. Our goal is always to provide you with excellent care. Hearing back from our patients is one way we can continue to improve our services. Please take a few minutes to complete the written survey that you may receive in the " mail after your visit with us. Thank you!             Your Updated Medication List - Protect others around you: Learn how to safely use, store and throw away your medicines at www.disposemymeds.org.          This list is accurate as of 7/12/18  9:37 AM.  Always use your most recent med list.                   Brand Name Dispense Instructions for use Diagnosis    acetaminophen 325 MG tablet    TYLENOL    150 tablet    Take 2 tablets (650 mg) by mouth every 4 hours as needed for other (multimodal surgical pain management along with NSAIDS and opioid medication as indicated based on pain control and physical function.)    Adrenal mass, right (H), Acute post-operative pain       buPROPion 100 MG tablet    WELLBUTRIN     TAKE 1 TABLET (100 MG TOTAL) BY MOUTH 2 (TWO) TIMES A DAY.        calcium carbonate antacid 1000 MG Chew      Take 1 tablet by mouth every morning        enoxaparin 40 MG/0.4ML injection    LOVENOX    14 Syringe    Inject 0.4 mLs (40 mg) Subcutaneous daily    Adrenal mass, right (H), H/O MTHFR mutation       EPINEPHrine 0.3 MG/0.3ML injection 2-pack    EPIPEN/ADRENACLICK/or ANY BX GENERIC EQUIV     As directed for bee stings        FLUoxetine 20 MG capsule    PROzac     Take 20 mg by mouth every morning        * hydrocortisone 20 MG tablet    CORTEF    30 tablet    Take 1 tablet (20 mg) by mouth every morning (then 10mg at 2pm) until followup with Endocrinology    Adrenal mass, right (H)       * hydrocortisone 10 MG tablet    CORTEF    30 tablet    Take 1 tablet (10 mg) by mouth daily At 2pm until follow up with Endocrinology (total: 30mg daily)    Adrenal mass, right (H)       Lidocaine 4 % Patch    LIDOCARE    14 patch    Place 2 patches onto the skin every 24 hours    Adrenal mass, right (H), Acute post-operative pain       LORazepam 0.5 MG tablet    ATIVAN    30 tablet    Take 1 tablet (0.5 mg) by mouth every 8 hours as needed for anxiety    Adrenal mass (H), Anxiety       ONE-A-DAY WOMENS PO       Take 2 tablets by mouth every morning        oxyCODONE IR 10 MG tablet    ROXICODONE    22 tablet    Take 0.5-1 tablets (5-10 mg) by mouth every 4 hours as needed for moderate to severe pain    Adrenal mass, right (H), Acute post-operative pain       VITAMIN D-1000 MAX ST 1000 units Tabs   Generic drug:  cholecalciferol      Take 2,000 Units by mouth every morning        * Notice:  This list has 2 medication(s) that are the same as other medications prescribed for you. Read the directions carefully, and ask your doctor or other care provider to review them with you.

## 2018-07-12 NOTE — LETTER
"7/12/2018       RE: Suzy Rodríguez  [address]  [facility] [address]     Dear Colleague,    Thank you for referring your patient, Suzy Rodríguez, to the [facility] AND [facility], [place] FOR PROSTATE AND UROLOGIC CANCERS at [facility]. Please see a copy of my visit note below.    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  Ki-67 mitotic rate 20%  [id number] (CT chest shows 3 mm nodules)    Stats from the national cancer database suggest 5 year survival is around 40%  [employer] Study [employer], numbers suggest 24% 5 year survival for stage 3    DHEA pre surgery was 740    Had an elevated DHEA pre-op    Measured today and it was < 15 so very good response    Patient is a 32 year old  female   Vitals: Blood pressure 126/81, pulse 116, height 1.626 m (5' 4\"), weight 72.2 kg (159 lb 3.2 oz), last menstrual period 06/20/2018, not currently breastfeeding.  General Appearance Adult: Alert, no acute distress, oriented  Incisions are healing well    A/P Adrenal Cortical Carcinoma, with possible positive margin, but no clear intraoperative evidence of a gross margin and the resection was as wide as possible without removing the cava.    Have already discussed at  oncology conf and it was recommended that she visit Medical Oncology and Radiation oncology.    She also has found an endocrinologist in [place] with whom she would like to consult and I encouraged this.    These are extremely rare tumors, and that makes evidence difficult to accumulate.    Will plan to re-image her in approximately 4 weeks.    Warren Mansfield MD  Department of Urology Staff  [facility]    Note, dictation software may have been used for this note and the content was not proofread for " accuracy

## 2018-07-12 NOTE — NURSING NOTE
Chief Complaint   Patient presents with     Surgical Followup     Post op.  S/P Adrenalectomy on 6-25-18.     LARRY MichaelA

## 2018-07-12 NOTE — PATIENT INSTRUCTIONS
Blood work today.    Schedule appointment with Medical Oncology (Dr. Cintron).    Schedule appointment with Radiation Oncology.  (Dr. Garcia)    Schedule appointment for PET scan.    Follow up with Dr. Mansfield in one month.    It was a pleasure meeting with you today.  Thank you for allowing me and my team the privilege of caring for you today.  YOU are the reason we are here, and I truly hope we provided you with the excellent service you deserve.  Please let us know if there is anything else we can do for you so that we can be sure you are leaving completely satisfied with your care experience.        SHAZIA Michael

## 2018-07-13 ASSESSMENT — PATIENT HEALTH QUESTIONNAIRE - PHQ9: SUM OF ALL RESPONSES TO PHQ QUESTIONS 1-9: 18

## 2018-07-14 ENCOUNTER — HOSPITAL ENCOUNTER (EMERGENCY)
Facility: CLINIC | Age: 33
Discharge: HOME | End: 2018-07-14
Attending: EMERGENCY MEDICINE | Admitting: EMERGENCY MEDICINE
Payer: COMMERCIAL

## 2018-07-14 ENCOUNTER — APPOINTMENT (OUTPATIENT)
Dept: ULTRASOUND IMAGING | Facility: CLINIC | Age: 33
End: 2018-07-14
Attending: EMERGENCY MEDICINE
Payer: COMMERCIAL

## 2018-07-14 ENCOUNTER — RECORDS - HEALTHEAST (OUTPATIENT)
Dept: ADMINISTRATIVE | Facility: OTHER | Age: 33
End: 2018-07-14

## 2018-07-14 VITALS
BODY MASS INDEX: 26.46 KG/M2 | HEIGHT: 64 IN | RESPIRATION RATE: 17 BRPM | TEMPERATURE: 99.1 F | SYSTOLIC BLOOD PRESSURE: 113 MMHG | WEIGHT: 155 LBS | DIASTOLIC BLOOD PRESSURE: 71 MMHG | OXYGEN SATURATION: 98 %

## 2018-07-14 DIAGNOSIS — M54.50 ACUTE RIGHT-SIDED LOW BACK PAIN WITHOUT SCIATICA: ICD-10-CM

## 2018-07-14 DIAGNOSIS — D64.9 ANEMIA, UNSPECIFIED TYPE: ICD-10-CM

## 2018-07-14 LAB
ALBUMIN SERPL-MCNC: 3.4 G/DL (ref 3.4–5)
ALBUMIN UR-MCNC: NEGATIVE MG/DL
ALP SERPL-CCNC: 141 U/L (ref 40–150)
ALT SERPL W P-5'-P-CCNC: 39 U/L (ref 0–50)
ANION GAP SERPL CALCULATED.3IONS-SCNC: 8 MMOL/L (ref 3–14)
APPEARANCE UR: ABNORMAL
AST SERPL W P-5'-P-CCNC: 25 U/L (ref 0–45)
BACTERIA #/AREA URNS HPF: ABNORMAL /HPF
BASOPHILS # BLD AUTO: 0 10E9/L (ref 0–0.2)
BASOPHILS NFR BLD AUTO: 0.5 %
BILIRUB SERPL-MCNC: 0.5 MG/DL (ref 0.2–1.3)
BILIRUB UR QL STRIP: NEGATIVE
BUN SERPL-MCNC: 6 MG/DL (ref 7–30)
CALCIUM SERPL-MCNC: 8.7 MG/DL (ref 8.5–10.1)
CAOX CRY #/AREA URNS HPF: ABNORMAL /HPF
CHLORIDE SERPL-SCNC: 100 MMOL/L (ref 94–109)
CO2 SERPL-SCNC: 28 MMOL/L (ref 20–32)
COLOR UR AUTO: ABNORMAL
CREAT SERPL-MCNC: 0.8 MG/DL (ref 0.52–1.04)
DIFFERENTIAL METHOD BLD: ABNORMAL
EOSINOPHIL # BLD AUTO: 0 10E9/L (ref 0–0.7)
EOSINOPHIL NFR BLD AUTO: 0.5 %
ERYTHROCYTE [DISTWIDTH] IN BLOOD BY AUTOMATED COUNT: 14.9 % (ref 10–15)
GFR SERPL CREATININE-BSD FRML MDRD: 83 ML/MIN/1.7M2
GLUCOSE SERPL-MCNC: 103 MG/DL (ref 70–99)
GLUCOSE UR STRIP-MCNC: NEGATIVE MG/DL
HCT VFR BLD AUTO: 28.3 % (ref 35–47)
HGB BLD-MCNC: 8.3 G/DL (ref 11.7–15.7)
HGB UR QL STRIP: ABNORMAL
HYALINE CASTS #/AREA URNS LPF: 13 /LPF (ref 0–2)
IMM GRANULOCYTES # BLD: 0 10E9/L (ref 0–0.4)
IMM GRANULOCYTES NFR BLD: 0.4 %
KETONES UR STRIP-MCNC: NEGATIVE MG/DL
LEUKOCYTE ESTERASE UR QL STRIP: NEGATIVE
LYMPHOCYTES # BLD AUTO: 1.3 10E9/L (ref 0.8–5.3)
LYMPHOCYTES NFR BLD AUTO: 17.3 %
MCH RBC QN AUTO: 25.9 PG (ref 26.5–33)
MCHC RBC AUTO-ENTMCNC: 29.3 G/DL (ref 31.5–36.5)
MCV RBC AUTO: 88 FL (ref 78–100)
MONOCYTES # BLD AUTO: 0.5 10E9/L (ref 0–1.3)
MONOCYTES NFR BLD AUTO: 6.9 %
MUCOUS THREADS #/AREA URNS LPF: PRESENT /LPF
NEUTROPHILS # BLD AUTO: 5.6 10E9/L (ref 1.6–8.3)
NEUTROPHILS NFR BLD AUTO: 74.4 %
NITRATE UR QL: NEGATIVE
NRBC # BLD AUTO: 0 10*3/UL
NRBC BLD AUTO-RTO: 0 /100
PH UR STRIP: 5 PH (ref 5–7)
PLATELET # BLD AUTO: 464 10E9/L (ref 150–450)
POTASSIUM SERPL-SCNC: 3.5 MMOL/L (ref 3.4–5.3)
PROT SERPL-MCNC: 7.1 G/DL (ref 6.8–8.8)
RBC # BLD AUTO: 3.2 10E12/L (ref 3.8–5.2)
RBC #/AREA URNS AUTO: 32 /HPF (ref 0–2)
SODIUM SERPL-SCNC: 136 MMOL/L (ref 133–144)
SOURCE: ABNORMAL
SP GR UR STRIP: 1.02 (ref 1–1.03)
SQUAMOUS #/AREA URNS AUTO: 6 /HPF (ref 0–1)
UROBILINOGEN UR STRIP-MCNC: 2 MG/DL (ref 0–2)
WBC # BLD AUTO: 7.6 10E9/L (ref 4–11)
WBC #/AREA URNS AUTO: 3 /HPF (ref 0–5)

## 2018-07-14 PROCEDURE — 81001 URINALYSIS AUTO W/SCOPE: CPT | Performed by: EMERGENCY MEDICINE

## 2018-07-14 PROCEDURE — 85025 COMPLETE CBC W/AUTO DIFF WBC: CPT | Performed by: EMERGENCY MEDICINE

## 2018-07-14 PROCEDURE — 25000132 ZZH RX MED GY IP 250 OP 250 PS 637: Performed by: EMERGENCY MEDICINE

## 2018-07-14 PROCEDURE — 80053 COMPREHEN METABOLIC PANEL: CPT | Performed by: EMERGENCY MEDICINE

## 2018-07-14 PROCEDURE — 99285 EMERGENCY DEPT VISIT HI MDM: CPT | Mod: Z6 | Performed by: EMERGENCY MEDICINE

## 2018-07-14 PROCEDURE — 96374 THER/PROPH/DIAG INJ IV PUSH: CPT

## 2018-07-14 PROCEDURE — 76770 US EXAM ABDO BACK WALL COMP: CPT

## 2018-07-14 PROCEDURE — 25000128 H RX IP 250 OP 636: Performed by: EMERGENCY MEDICINE

## 2018-07-14 PROCEDURE — 99285 EMERGENCY DEPT VISIT HI MDM: CPT | Mod: 25

## 2018-07-14 RX ORDER — LORAZEPAM 2 MG/ML
1 INJECTION INTRAMUSCULAR ONCE
Status: COMPLETED | OUTPATIENT
Start: 2018-07-14 | End: 2018-07-14

## 2018-07-14 RX ORDER — OXYCODONE AND ACETAMINOPHEN 5; 325 MG/1; MG/1
1 TABLET ORAL ONCE
Status: COMPLETED | OUTPATIENT
Start: 2018-07-14 | End: 2018-07-14

## 2018-07-14 RX ORDER — OXYCODONE AND ACETAMINOPHEN 5; 325 MG/1; MG/1
1-2 TABLET ORAL EVERY 4 HOURS PRN
Qty: 12 TABLET | Refills: 0 | Status: SHIPPED | OUTPATIENT
Start: 2018-07-14 | End: 2018-11-05

## 2018-07-14 RX ADMIN — OXYCODONE HYDROCHLORIDE AND ACETAMINOPHEN 1 TABLET: 5; 325 TABLET ORAL at 16:19

## 2018-07-14 RX ADMIN — LORAZEPAM 1 MG: 2 INJECTION INTRAMUSCULAR; INTRAVENOUS at 16:20

## 2018-07-14 ASSESSMENT — ENCOUNTER SYMPTOMS
FEVER: 1
DIARRHEA: 1
CHILLS: 1
BACK PAIN: 1

## 2018-07-14 NOTE — ED PROVIDER NOTES
History     Chief Complaint   Patient presents with     Flank Pain     started this am, thinks she has a stone, possible a post op problem      HPI  Suzy Rodríguez is a 32 year old female who has a history of adrenal cortex cancer, chest pain, pleural effusion, ascites, and anemia who presents to the ED for evaluation of right flank pain. Patient had a mass removed from her right adrenal gland and currently has a good incision site. She states that her pain began this morning and feels similar to the pain she felt 11 years ago when she passed kidney stones. She states it is an intense pain that will subside after approximately 20 minutes. She states that since her surgery, she has felt like she has a fever and experiences periods of chills. Now that she can pass bowel movements after her surgery, she states that she has been having diarrhea for a week. The patient states that she still has her gallbladder. Patient does have bilateral bruises on her legs, but states those are from before her surgery.    Patient denies any nausea, vomiting, chest pain, or shortness of breath.  Problem List:    Patient Active Problem List    Diagnosis Date Noted     Postoperative anemia due to acute blood loss 06/30/2018     Priority: Medium     Pleural effusion on right 06/30/2018     Priority: Medium     Ascites 06/30/2018     Priority: Medium     Adrenal cortex cancer, right (H) 06/30/2018     Priority: Medium     Resected 6/25/18, Dr. Gardner       Anemia 06/30/2018     Priority: Medium     Acute post-operative pain 06/29/2018     Priority: Medium     Chest pain 06/29/2018     Priority: Medium     Adrenal mass, right (H) 05/25/2018     Priority: Medium        Past Medical History:    Past Medical History:   Diagnosis Date     Adrenal cortex cancer, right (H) 6/30/2018     Adrenal mass (H)        Past Surgical History:    Past Surgical History:   Procedure Laterality Date     ADRENALECTOMY Right 6/25/2018    Procedure:  "ADRENALECTOMY;  Right Adrenalectomy,  Embolize Liver , Anesthesia Block;  Surgeon: Warren Mansfield MD;  Location: [facility] OR      SECTION      twice     EMBOLECTOMY ABDOMEN N/A 2018    Procedure: EMBOLECTOMY ABDOMEN;;  Surgeon: Ector Mcintyre MD;  Location: [facility] OR     GASTRIC BYPASS         Family History:    No family history on file.    Social History:  Marital Status:   [2]  Social History   Substance Use Topics     Smoking status: Never Smoker     Smokeless tobacco: Never Used     Alcohol use Yes      Comment: occ.        Medications:      oxyCODONE-acetaminophen (PERCOCET) 5-325 MG per tablet   acetaminophen (TYLENOL) 325 MG tablet   buPROPion (WELLBUTRIN) 100 MG tablet   calcium carbonate antacid 1000 MG CHEW   cholecalciferol (VITAMIN D-1000 MAX ST) 1000 units TABS   enoxaparin (LOVENOX) 40 MG/0.4ML injection   EPINEPHrine (EPIPEN/ADRENACLICK/OR ANY BX GENERIC EQUIV) 0.3 MG/0.3ML injection 2-pack   FLUoxetine (PROZAC) 20 MG capsule   hydrocortisone (CORTEF) 10 MG tablet   hydrocortisone (CORTEF) 20 MG tablet   Lidocaine (LIDOCARE) 4 % Patch   LORazepam (ATIVAN) 0.5 MG tablet   Multiple Vitamins-Calcium (ONE-A-DAY WOMENS PO)   oxyCODONE IR (ROXICODONE) 10 MG tablet         Review of Systems   Constitutional: Positive for chills and fever.   Gastrointestinal: Positive for diarrhea.   Musculoskeletal: Positive for back pain.   All other systems reviewed and are negative.      Physical Exam   BP: (!) 137/99  Heart Rate: 83  Temp: 98.9  F (37.2  C)  Resp: 17  Height: 162.6 cm (5' 4\")  Weight: 70.3 kg (155 lb)  SpO2: 100 %      Physical Exam general alert cooperative female in mild to moderate distress.  HEENT shows no proptosis.  No scleral icterus.  Speech is clear and concise.  Neck is supple without stridor.  Lungs are clear without adventitious sounds.  Cardiac regular rate without murmur.  Back reveals no CVA tenderness.  She does have tenderness over the right SI joint and " muscle spasm associated.  This reproduces her pain.  No skin rash over this area.  Abdominal exam she has a large intact midline incision.  No evidence of erythema, dehiscence, or drainage.  No fluctuance.  She has no leg pain or swelling.    ED Course     ED Course     Procedures           Results for orders placed or performed during the hospital encounter of 07/14/18   Retroperitoneal US    Narrative    RENAL ULTRASOUND   7/14/2018 4:20 PM     HISTORY: Right flank pain, history of stones, recent surgery for  right-sided adrenal cancer.    COMPARISON: CT dated 6/29/2018    FINDINGS:    Right Kidney: The kidney is normal in size and cortical thickness.  No  renal masses are seen.  No hydronephrosis, but there is a small stone  again seen in the interpolar region.     Left Kidney: The kidney is normal in size and cortical thickness.  No  renal masses are seen.  No hydronephrosis or definite stone  identified.    The visualized portions of the urinary bladder appear normal.     Fluid collections adjacent to the right kidney are again seen, likely  related to recent surgery.    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      Impression    IMPRESSION:    1. No hydronephrosis seen on either side, but nonobstructing stone  again seen on the right.  2. Fluid collections adjacent to the right kidney again seen, likely  related to recent surgery.    ROMAINE MOSHER MD            Critical Care time:  none               Results for orders placed or performed during the hospital encounter of 07/14/18 (from the past 24 hour(s))   CBC with platelets differential   Result Value Ref Range    WBC 7.6 4.0 - 11.0 10e9/L    RBC Count 3.20 (L) 3.8 - 5.2 10e12/L    Hemoglobin 8.3 (L) 11.7 - 15.7 g/dL    Hematocrit 28.3 (L) 35.0 - 47.0 %    MCV 88 78 - 100 fl    MCH 25.9 (L) 26.5 - 33.0 pg    MCHC 29.3 (L) 31.5 - 36.5 g/dL    RDW 14.9 10.0 - 15.0 %    Platelet Count 464 (H) 150 - 450 10e9/L    Diff Method Automated Method     % Neutrophils  74.4 %    % Lymphocytes 17.3 %    % Monocytes 6.9 %    % Eosinophils 0.5 %    % Basophils 0.5 %    % Immature Granulocytes 0.4 %    Nucleated RBCs 0 0 /100    Absolute Neutrophil 5.6 1.6 - 8.3 10e9/L    Absolute Lymphocytes 1.3 0.8 - 5.3 10e9/L    Absolute Monocytes 0.5 0.0 - 1.3 10e9/L    Absolute Eosinophils 0.0 0.0 - 0.7 10e9/L    Absolute Basophils 0.0 0.0 - 0.2 10e9/L    Abs Immature Granulocytes 0.0 0 - 0.4 10e9/L    Absolute Nucleated RBC 0.0    Comprehensive metabolic panel   Result Value Ref Range    Sodium 136 133 - 144 mmol/L    Potassium 3.5 3.4 - 5.3 mmol/L    Chloride 100 94 - 109 mmol/L    Carbon Dioxide 28 20 - 32 mmol/L    Anion Gap 8 3 - 14 mmol/L    Glucose 103 (H) 70 - 99 mg/dL    Urea Nitrogen 6 (L) 7 - 30 mg/dL    Creatinine 0.80 0.52 - 1.04 mg/dL    GFR Estimate 83 >60 mL/min/1.7m2    GFR Estimate If Black >90 >60 mL/min/1.7m2    Calcium 8.7 8.5 - 10.1 mg/dL    Bilirubin Total 0.5 0.2 - 1.3 mg/dL    Albumin 3.4 3.4 - 5.0 g/dL    Protein Total 7.1 6.8 - 8.8 g/dL    Alkaline Phosphatase 141 40 - 150 U/L    ALT 39 0 - 50 U/L    AST 25 0 - 45 U/L   UA reflex to Microscopic and Culture   Result Value Ref Range    Color Urine Cynthia     Appearance Urine Slightly Cloudy     Glucose Urine Negative NEG^Negative mg/dL    Bilirubin Urine Negative NEG^Negative    Ketones Urine Negative NEG^Negative mg/dL    Specific Gravity Urine 1.020 1.003 - 1.035    Blood Urine Small (A) NEG^Negative    pH Urine 5.0 5.0 - 7.0 pH    Protein Albumin Urine Negative NEG^Negative mg/dL    Urobilinogen mg/dL 2.0 0.0 - 2.0 mg/dL    Nitrite Urine Negative NEG^Negative    Leukocyte Esterase Urine Negative NEG^Negative    Source Midstream Urine     RBC Urine 32 (H) 0 - 2 /HPF    WBC Urine 3 0 - 5 /HPF    Bacteria Urine Few (A) NEG^Negative /HPF    Squamous Epithelial /HPF Urine 6 (H) 0 - 1 /HPF    Mucous Urine Present (A) NEG^Negative /LPF    Hyaline Casts 13 (H) 0 - 2 /LPF    Calcium Oxalate Few (A) NEG^Negative /HPF    Retroperitoneal US    Narrative    RENAL ULTRASOUND   7/14/2018 4:20 PM     HISTORY: Right flank pain, history of stones, recent surgery for  right-sided adrenal cancer.    COMPARISON: CT dated 6/29/2018    FINDINGS:    Right Kidney: The kidney is normal in size and cortical thickness.  No  renal masses are seen.  No hydronephrosis, but there is a small stone  again seen in the interpolar region.     Left Kidney: The kidney is normal in size and cortical thickness.  No  renal masses are seen.  No hydronephrosis or definite stone  identified.    The visualized portions of the urinary bladder appear normal.     Fluid collections adjacent to the right kidney are again seen, likely  related to recent surgery.    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      Impression    IMPRESSION:    1. No hydronephrosis seen on either side, but nonobstructing stone  again seen on the right.  2. Fluid collections adjacent to the right kidney again seen, likely  related to recent surgery.    ROMAINE MOSHER MD       Medications   LORazepam (ATIVAN) injection 1 mg (1 mg Intravenous Given 7/14/18 1620)   oxyCODONE-acetaminophen (PERCOCET) 5-325 MG per tablet 1 tablet (1 tablet Oral Given 7/14/18 1619)     IV is established and blood work is obtained.  With her recent adrenal surgery, history of renal stones, and blood in her urine today a ultrasound was obtained to look at the kidney and bladder.  Assessments & Plan (with Medical Decision Making)   Suzy Rodríguez is a 32 year old female who has a history of adrenal cortex cancer, chest pain, pleural effusion, ascites, and anemia who presents to the ED for evaluation of right flank pain. Patient had a mass removed from her right adrenal gland and currently has a good incision site. She states that her pain began this morning and feels similar to the pain she felt 11 years ago when she passed kidney stones. She states it is an intense pain that will subside after approximately 20  minutes. She states that since her surgery, she has felt like she has a fever and experiences periods of chills. Now that she can pass bowel movements after her surgery, she states that she has been having diarrhea for a week. The patient states that she still has her gallbladder. Patient does have bilateral bruises on her legs, but states those are from before her surgery.  On presentation patient was afebrile.  She was not tachycardic.  Diastolic blood pressure was mildly elevated but normal systolic pressure.  HEENT shows no proptosis or scleral icterus.  She is able speak in complete sentences.  Neck was supple.  Normal cardiac and respiratory auscultation.  No CVA tenderness.  Did have reproducible tenderness over right SI joint.  No bruising or rashes were noted over this area.  Associated muscle spasm.  Her abdominal exam reveals intact surgical scar with a non-surgical abdomen.  Work is obtained and does show anemia with a hemoglobin of 8.3.  Most recent value was 7.3.  Patient states that on Friday the hemoglobin was 8.4.  She denies any dark or bloody stools.  She has had no hematemesis.  Does have some superficial bruises on her shins but states they have been there before the procedure was done.  White count was normal. Comprehensive metabolic panel was unremarkable.  Renal ultrasound showed no evidence of hydronephrosis on either side.  There was nonobstructing stone in the right kidney.  Again fluid collection was noted in the right carried area related to her recent surgery.  Patient was given Ativan as a muscle relaxant and Percocet which she been prescribed for pain.  This did not improve her back pain.  I suspect her pain today is related to her lumbar back or SI area.  She will continue take her oral steroids for adrenal issues.  She can take Percocet for severe pain.  She has Ativan for muscle relaxation and she will continue take that.  If symptoms persist consider direct injection.  Acupuncture  may also be beneficial.  She questions if she can go to chiropractic care and at this point I did wait to see if she has some improvement over the next 48 hours.  Reasons to return to emergency room discussed.  I have reviewed the nursing notes.    I have reviewed the findings, diagnosis, plan and need for follow up with the patient.       New Prescriptions    OXYCODONE-ACETAMINOPHEN (PERCOCET) 5-325 MG PER TABLET    Take 1-2 tablets by mouth every 4 hours as needed for pain       Final diagnoses:   Anemia, unspecified type   Acute right-sided low back pain without sciatica     This document serves as a record of the services and decisions personally performed and made by Cooper Jett MD. It was created on his behalf by Brianna Muhammad, a trained medical scribe. The creation of this document is based the provider's statements to the medical scribe.  Brianna Muhammad 3:05 PM 7/14/2018    Provider:   The information in this document, created by the medical scribe for me, accurately reflects the services I personally performed and the decisions made by me. I have reviewed and approved this document for accuracy prior to leaving the patient care area.  Cooper Jett MD 3:05 PM 7/14/2018 7/14/2018   [facility] EMERGENCY DEPARTMENT     Cooper Jett MD  07/14/18 [id number]

## 2018-07-14 NOTE — ED AVS SNAPSHOT
[facility] Emergency Department    [address] [address]    Phone:  [phone]    Fax:  [phone]                                       Suzy Rodríguez   MRN: [id number]    Department:  [facility] Emergency Department   Date of Visit:  7/14/2018           After Visit Summary Signature Page     I have received my discharge instructions, and my questions have been answered. I have discussed any challenges I see with this plan with the nurse or doctor.    ..........................................................................................................................................  Patient/Patient Representative Signature      ..........................................................................................................................................  Patient Representative Print Name and Relationship to Patient    ..................................................               ................................................  Date                                            Time    ..........................................................................................................................................  Reviewed by Signature/Title    ...................................................              ..............................................  Date                                                            Time

## 2018-07-14 NOTE — ED AVS SNAPSHOT
[facility] Emergency Department    [address]    [facility] [address]    Phone:  [phone]    Fax:  [phone]                                       Suzy Rodríguez   MRN: [id number]    Department:  [facility] Emergency Department   Date of Visit:  7/14/2018           Patient Information     Date Of Birth          1985        Your diagnoses for this visit were:     Anemia, unspecified type     Acute right-sided low back pain without sciatica        You were seen by Cooper Jett MD.      Follow-up Information     Follow up with Clinic, [facility].    Why:  If symptoms persist.    Contact information:    [address] [facility] [address] [phone]        Discharge References/Attachments     SACROILIAC STRAIN, UNDERSTANDING (ENGLISH)      Your next 10 appointments already scheduled     Jul 16, 2018  3:15 PM CDT   (Arrive by 3:00 PM)   New Patient Visit with Benson Cintron MD   [facility] ([facility])    [address]  [address]  [place] [address] [phone]            Jul 18, 2018  1:00 PM CDT   CONSULT with Dane Garcia MD   Radiation Oncology Clinic ([facility])    [facility]  1st Floor  [address] [address] [phone]            Jul 25, 2018 11:20 AM CDT   (Arrive by 11:05 AM)   RETURN ENDOCRINE with Roel Beverly MD   [facility] ([facility])    [address]  3rd Floor  [place] [address] [phone]            Aug 09, 2018 11:30 AM CDT   (Arrive by 11:00 AM)   PET ONCOLOGY WHOLE BODY with UMPPET1   [facility] ([facility])    [address] [facility] [address]   [phone]           Tell your doctor:   If there is any chance you may be pregnant or if you are breastfeeding.   If you have problems lying in small spaces  (claustrophobia). If you do, your doctor may give you medicine to help you relax. If you have diabetes:   Have your exam early in the morning. Your blood glucose will go up as the day goes by.   Your glucose level must be 180 or less at the start of the exam. Please take any oral diabetic medication you need to ensure this blood glucose level is below 180, but no insulin 4 hours prior to the exam   If you are taking insulin in the morning take with breakfast by 6 am and schedule procedure between 12 and 2:15 pm.   If you are taking insulin at night take nightly dose, fast overnight, schedule procedure before 10 am.   If you take insulin both morning and night take morning dose by 6am and schedule procedure between 12 and 2:15 pm.   24 hours before your scan: Don t do any heavy exercise. (No jogging, aerobics or other workouts.) Exercise will make your pictures less accurate.  Patients should eat a low carb, high protein meal 7 hours (or the last meal you eat) before the scan. Low carb, high protein meals consist of lean meats, seafood, beans, soy, low-fat dairy, eggs, nuts & seeds. 6 hours before your scan:   Stop all food and liquids (except water).   Do not chew gum or suck on mints.   If you need to take medicine with food, you may take it with a few crackers.  Please call your Imaging Department at your exam site with any questions.            Aug 23, 2018  7:20 AM CDT   (Arrive by 7:05 AM)   Return Visit with Warren Mansfield MD   [facility] ([facility])    [address] [place] [address] [phone]              24 Hour Appointment Hotline       To make an appointment at any [facility], call [phone] ([phone]). If you don't have a family doctor or clinic, we will help you find one. [facility] clinics are conveniently located to serve the needs of you and your family.              Review of your medicines      START taking        Dose / Directions Last dose taken    oxyCODONE-acetaminophen 5-325 MG per tablet   Commonly known as:  PERCOCET   Dose:  1-2 tablet   Quantity:  12 tablet        Take 1-2 tablets by mouth every 4 hours as needed for pain   Refills:  0          Our records show that you are taking the medicines listed below. If these are incorrect, please call your family doctor or clinic.        Dose / Directions Last dose taken    acetaminophen 325 MG tablet   Commonly known as:  TYLENOL   Dose:  650 mg   Quantity:  150 tablet        Take 2 tablets (650 mg) by mouth every 4 hours as needed for other (multimodal surgical pain management along with NSAIDS and opioid medication as indicated based on pain control and physical function.)   Refills:  0        buPROPion 100 MG tablet   Commonly known as:  WELLBUTRIN        TAKE 1 TABLET (100 MG TOTAL) BY MOUTH 2 (TWO) TIMES A DAY.   Refills:  3        calcium carbonate antacid 1000 MG Chew   Dose:  1 tablet        Take 1 tablet by mouth every morning   Refills:  0        enoxaparin 40 MG/0.4ML injection   Commonly known as:  LOVENOX   Dose:  40 mg   Quantity:  14 Syringe        Inject 0.4 mLs (40 mg) Subcutaneous daily   Refills:  0        EPINEPHrine 0.3 MG/0.3ML injection 2-pack   Commonly known as:  EPIPEN/ADRENACLICK/or ANY BX GENERIC EQUIV        As directed for bee stings   Refills:  0        FLUoxetine 20 MG capsule   Commonly known as:  PROzac   Dose:  20 mg        Take 20 mg by mouth every morning   Refills:  0        * hydrocortisone 20 MG tablet   Commonly known as:  CORTEF   Dose:  20 mg   Quantity:  30 tablet        Take 1 tablet (20 mg) by mouth every morning (then 10mg at 2pm) until followup with Endocrinology   Refills:  1        * hydrocortisone 10 MG tablet   Commonly known as:  CORTEF   Dose:  10 mg   Quantity:  30 tablet        Take 1 tablet (10 mg) by mouth daily At 2pm until follow up with Endocrinology (total: 30mg  daily)   Refills:  1        Lidocaine 4 % Patch   Commonly known as:  LIDOCARE   Dose:  2 patch   Quantity:  14 patch        Place 2 patches onto the skin every 24 hours   Refills:  0        LORazepam 0.5 MG tablet   Commonly known as:  ATIVAN   Dose:  0.5 mg   Quantity:  30 tablet        Take 1 tablet (0.5 mg) by mouth every 8 hours as needed for anxiety   Refills:  0        ONE-A-DAY WOMENS PO   Dose:  2 tablet        Take 2 tablets by mouth every morning   Refills:  0        oxyCODONE IR 10 MG tablet   Commonly known as:  ROXICODONE   Dose:  5-10 mg   Quantity:  22 tablet        Take 0.5-1 tablets (5-10 mg) by mouth every 4 hours as needed for moderate to severe pain   Refills:  0        VITAMIN D-1000 MAX ST 1000 units Tabs   Dose:  2000 Units   Generic drug:  cholecalciferol        Take 2,000 Units by mouth every morning   Refills:  0        * Notice:  This list has 2 medication(s) that are the same as other medications prescribed for you. Read the directions carefully, and ask your doctor or other care provider to review them with you.            Information about OPIOIDS     PRESCRIPTION OPIOIDS: WHAT YOU NEED TO KNOW   We gave you an opioid (narcotic) pain medicine. It is important to manage your pain, but opioids are not always the best choice. You should first try all the other options your care team gave you. Take this medicine for as short a time (and as few doses) as possible.     These medicines have risks:    DO NOT drive when on new or higher doses of pain medicine. These medicines can affect your alertness and reaction times, and you could be arrested for driving under the influence (DUI). If you need to use opioids long-term, talk to your care team about driving.    DO NOT operate heave machinery    DO NOT do any other dangerous activities while taking these medicines.     DO NOT drink any alcohol while taking these medicines.      If the opioid prescribed includes acetaminophen, DO NOT take with  any other medicines that contain acetaminophen. Read all labels carefully. Look for the word  acetaminophen  or  Tylenol.  Ask your pharmacist if you have questions or are unsure.    You can get addicted to pain medicines, especially if you have a history of addiction (chemical, alcohol or substance dependence). Talk to your care team about ways to reduce this risk.    Store your pills in a secure place, locked if possible. We will not replace any lost or stolen medicine. If you don t finish your medicine, please throw away (dispose) as directed by your pharmacist. The [place] Pollution Control Agency has more information about safe disposal: https://www.pca.[facility].[place].us/living-green/managing-unwanted-medications.     All opioids tend to cause constipation. Drink plenty of water and eat foods that have a lot of fiber, such as fruits, vegetables, prune juice, apple juice and high-fiber cereal. Take a laxative (Miralax, milk of magnesia, Colace, Senna) if you don t move your bowels at least every other day.         Prescriptions were sent or printed at these locations (1 Prescription)                   Other Prescriptions                Printed at Department/Unit printer (1 of 1)         oxyCODONE-acetaminophen (PERCOCET) 5-325 MG per tablet                Procedures and tests performed during your visit     CBC with platelets differential    Comprehensive metabolic panel    Peripheral IV catheter    Retroperitoneal US    UA reflex to Microscopic and Culture      Orders Needing Specimen Collection     None      Pending Results     No orders found from 7/12/2018 to 7/15/2018.            Pending Culture Results     No orders found from 7/12/2018 to 7/15/2018.            Pending Results Instructions     If you had any lab results that were not finalized at the time of your Discharge, you can call the ED Lab Result RN at [phone]. You will be contacted by this team for any positive Lab results or changes in  treatment. The nurses are available 7 days a week from 10A to 6:30P.  You can leave a message 24 hours per day and they will return your call.        Test Results From Your Hospital Stay        7/14/2018  3:17 PM      Component Results     Component Value Ref Range & Units Status    WBC 7.6 4.0 - 11.0 10e9/L Final    RBC Count 3.20 (L) 3.8 - 5.2 10e12/L Final    Hemoglobin 8.3 (L) 11.7 - 15.7 g/dL Final    Hematocrit 28.3 (L) 35.0 - 47.0 % Final    MCV 88 78 - 100 fl Final    MCH 25.9 (L) 26.5 - 33.0 pg Final    MCHC 29.3 (L) 31.5 - 36.5 g/dL Final    RDW 14.9 10.0 - 15.0 % Final    Platelet Count 464 (H) 150 - 450 10e9/L Final    Diff Method Automated Method  Final    % Neutrophils 74.4 % Final    % Lymphocytes 17.3 % Final    % Monocytes 6.9 % Final    % Eosinophils 0.5 % Final    % Basophils 0.5 % Final    % Immature Granulocytes 0.4 % Final    Nucleated RBCs 0 0 /100 Final    Absolute Neutrophil 5.6 1.6 - 8.3 10e9/L Final    Absolute Lymphocytes 1.3 0.8 - 5.3 10e9/L Final    Absolute Monocytes 0.5 0.0 - 1.3 10e9/L Final    Absolute Eosinophils 0.0 0.0 - 0.7 10e9/L Final    Absolute Basophils 0.0 0.0 - 0.2 10e9/L Final    Abs Immature Granulocytes 0.0 0 - 0.4 10e9/L Final    Absolute Nucleated RBC 0.0  Final         7/14/2018  3:33 PM      Component Results     Component Value Ref Range & Units Status    Sodium 136 133 - 144 mmol/L Final    Potassium 3.5 3.4 - 5.3 mmol/L Final    Chloride 100 94 - 109 mmol/L Final    Carbon Dioxide 28 20 - 32 mmol/L Final    Anion Gap 8 3 - 14 mmol/L Final    Glucose 103 (H) 70 - 99 mg/dL Final    Urea Nitrogen 6 (L) 7 - 30 mg/dL Final    Creatinine 0.80 0.52 - 1.04 mg/dL Final    GFR Estimate 83 >60 mL/min/1.7m2 Final    Non  GFR Calc    GFR Estimate If Black >90 >60 mL/min/1.7m2 Final    African American GFR Calc    Calcium 8.7 8.5 - 10.1 mg/dL Final    Bilirubin Total 0.5 0.2 - 1.3 mg/dL Final    Albumin 3.4 3.4 - 5.0 g/dL Final    Protein Total 7.1 6.8 - 8.8  g/dL Final    Alkaline Phosphatase 141 40 - 150 U/L Final    ALT 39 0 - 50 U/L Final    AST 25 0 - 45 U/L Final         7/14/2018  3:36 PM      Component Results     Component Value Ref Range & Units Status    Color Urine Cynthia  Final    Appearance Urine Slightly Cloudy  Final    Glucose Urine Negative NEG^Negative mg/dL Final    Bilirubin Urine Negative NEG^Negative Final    Ketones Urine Negative NEG^Negative mg/dL Final    Specific Gravity Urine 1.020 1.003 - 1.035 Final    Blood Urine Small (A) NEG^Negative Final    pH Urine 5.0 5.0 - 7.0 pH Final    Protein Albumin Urine Negative NEG^Negative mg/dL Final    Urobilinogen mg/dL 2.0 0.0 - 2.0 mg/dL Final    Nitrite Urine Negative NEG^Negative Final    Leukocyte Esterase Urine Negative NEG^Negative Final    Source Midstream Urine  Final    RBC Urine 32 (H) 0 - 2 /HPF Final    WBC Urine 3 0 - 5 /HPF Final    Bacteria Urine Few (A) NEG^Negative /HPF Final    Squamous Epithelial /HPF Urine 6 (H) 0 - 1 /HPF Final    Mucous Urine Present (A) NEG^Negative /LPF Final    Hyaline Casts 13 (H) 0 - 2 /LPF Final    Calcium Oxalate Few (A) NEG^Negative /HPF Final         7/14/2018  4:27 PM      Narrative     RENAL ULTRASOUND   7/14/2018 4:20 PM     HISTORY: Right flank pain, history of stones, recent surgery for  right-sided adrenal cancer.    COMPARISON: CT dated 6/29/2018    FINDINGS:    Right Kidney: The kidney is normal in size and cortical thickness.  No  renal masses are seen.  No hydronephrosis, but there is a small stone  again seen in the interpolar region.     Left Kidney: The kidney is normal in size and cortical thickness.  No  renal masses are seen.  No hydronephrosis or definite stone  identified.    The visualized portions of the urinary bladder appear normal.     Fluid collections adjacent to the right kidney are again seen, likely  related to recent surgery.    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        Impression     IMPRESSION:    1. No hydronephrosis  seen on either side, but nonobstructing stone  again seen on the right.  2. Fluid collections adjacent to the right kidney again seen, likely  related to recent surgery.    ROMAINE MOSHER MD                Thank you for choosing [facility]       Thank you for choosing [facility] for your care. Our goal is always to provide you with excellent care. Hearing back from our patients is one way we can continue to improve our services. Please take a few minutes to complete the written survey that you may receive in the mail after you visit with us. Thank you!        [employer] Information     [facility] gives you secure access to your electronic health record. If you see a primary care provider, you can also send messages to your care team and make appointments. If you have questions, please call your primary care clinic.  If you do not have a primary care provider, please call [phone] and they will assist you.        Care EveryWhere ID     This is your Care EveryWhere ID. This could be used by other organizations to access your [facility] medical records  [id number]        Equal Access to Services     MARQUITA VALADEZ : Hadlea Redding, waaxda luhowie, qaybta kaalmajohnnie koehler, coleman putnam . So [facility] [phone].    ATENCIÓN: Si habla español, tiene a lyman disposición servicios gratuitos de asistencia lingüística. Llame al [phone].    We comply with applicable federal civil rights laws and [place] laws. We do not discriminate on the basis of race, color, national origin, age, disability, sex, sexual orientation, or gender identity.            After Visit Summary       This is your record. Keep this with you and show to your community pharmacist(s) and doctor(s) at your next visit.

## 2018-07-16 ENCOUNTER — ONCOLOGY VISIT (OUTPATIENT)
Dept: ONCOLOGY | Facility: CLINIC | Age: 33
End: 2018-07-16
Attending: INTERNAL MEDICINE
Payer: COMMERCIAL

## 2018-07-16 ENCOUNTER — RECORDS - HEALTHEAST (OUTPATIENT)
Dept: ADMINISTRATIVE | Facility: OTHER | Age: 33
End: 2018-07-16

## 2018-07-16 ENCOUNTER — CARE COORDINATION (OUTPATIENT)
Dept: ONCOLOGY | Facility: CLINIC | Age: 33
End: 2018-07-16

## 2018-07-16 VITALS
HEART RATE: 114 BPM | BODY MASS INDEX: 27.14 KG/M2 | HEIGHT: 64 IN | TEMPERATURE: 99.4 F | DIASTOLIC BLOOD PRESSURE: 89 MMHG | WEIGHT: 159 LBS | SYSTOLIC BLOOD PRESSURE: 126 MMHG | OXYGEN SATURATION: 98 % | RESPIRATION RATE: 16 BRPM

## 2018-07-16 DIAGNOSIS — C74.01 ADRENAL CORTEX CANCER, RIGHT (H): Primary | ICD-10-CM

## 2018-07-16 DIAGNOSIS — F43.23 ADJUSTMENT DISORDER WITH MIXED ANXIETY AND DEPRESSED MOOD: ICD-10-CM

## 2018-07-16 PROCEDURE — 99205 OFFICE O/P NEW HI 60 MIN: CPT | Mod: ZP | Performed by: INTERNAL MEDICINE

## 2018-07-16 PROCEDURE — G0463 HOSPITAL OUTPT CLINIC VISIT: HCPCS | Mod: ZF

## 2018-07-16 RX ORDER — ALBUTEROL SULFATE 90 UG/1
1-2 AEROSOL, METERED RESPIRATORY (INHALATION)
Status: CANCELLED
Start: 2018-07-16

## 2018-07-16 RX ORDER — EPINEPHRINE 0.3 MG/.3ML
0.3 INJECTION SUBCUTANEOUS EVERY 5 MIN PRN
Status: CANCELLED | OUTPATIENT
Start: 2018-07-16

## 2018-07-16 RX ORDER — DIPHENHYDRAMINE HYDROCHLORIDE 50 MG/ML
50 INJECTION INTRAMUSCULAR; INTRAVENOUS
Status: CANCELLED
Start: 2018-07-16

## 2018-07-16 RX ORDER — SODIUM CHLORIDE 9 MG/ML
1000 INJECTION, SOLUTION INTRAVENOUS CONTINUOUS PRN
Status: CANCELLED
Start: 2018-07-16

## 2018-07-16 RX ORDER — EPINEPHRINE 1 MG/ML
0.3 INJECTION, SOLUTION INTRAMUSCULAR; SUBCUTANEOUS EVERY 5 MIN PRN
Status: CANCELLED | OUTPATIENT
Start: 2018-07-16

## 2018-07-16 RX ORDER — ALBUTEROL SULFATE 0.83 MG/ML
2.5 SOLUTION RESPIRATORY (INHALATION)
Status: CANCELLED | OUTPATIENT
Start: 2018-07-16

## 2018-07-16 RX ORDER — METHYLPREDNISOLONE SODIUM SUCCINATE 125 MG/2ML
125 INJECTION, POWDER, LYOPHILIZED, FOR SOLUTION INTRAMUSCULAR; INTRAVENOUS
Status: CANCELLED
Start: 2018-07-16

## 2018-07-16 RX ORDER — MEPERIDINE HYDROCHLORIDE 25 MG/ML
25 INJECTION INTRAMUSCULAR; INTRAVENOUS; SUBCUTANEOUS EVERY 30 MIN PRN
Status: CANCELLED | OUTPATIENT
Start: 2018-07-16

## 2018-07-16 RX ORDER — ONDANSETRON 8 MG/1
8 TABLET, FILM COATED ORAL EVERY 8 HOURS PRN
Qty: 30 TABLET | Refills: 0 | Status: SHIPPED | OUTPATIENT
Start: 2018-07-16 | End: 2018-08-29

## 2018-07-16 ASSESSMENT — PAIN SCALES - GENERAL: PAINLEVEL: SEVERE PAIN (6)

## 2018-07-16 NOTE — NURSING NOTE
"Oncology Rooming Note    July 16, 2018 3:14 PM   Suzy Rodríguez is a 32 year old female who presents for:    Chief Complaint   Patient presents with     Oncology Clinic Visit     New Patient; Adrenal Cortical Ca     Initial Vitals: /89  Pulse 114  Temp 99.4  F (37.4  C) (Oral)  Resp 16  Ht 1.626 m (5' 4\")  Wt 72.1 kg (159 lb)  LMP 06/20/2018 (Approximate)  SpO2 98%  Breastfeeding? No  BMI 27.29 kg/m2 Estimated body mass index is 27.29 kg/(m^2) as calculated from the following:    Height as of this encounter: 1.626 m (5' 4\").    Weight as of this encounter: 72.1 kg (159 lb). Body surface area is 1.8 meters squared.  Severe Pain (6) Comment: back pain   Patient's last menstrual period was 06/20/2018 (approximate).  Allergies reviewed: Yes  Medications reviewed: Yes    Medications: Medication refills not needed today.  Pharmacy name entered into [employer]:    [id number] - [facility] [address] [facility] [place], [place] - [address] [place]    Clinical concerns: new; adrenal cortical Ca     6 minutes for nursing intake (face to face time)     Sulma Altamirano CMA              "

## 2018-07-16 NOTE — MR AVS SNAPSHOT
After Visit Summary   7/16/2018    Suzy Rodríguez    MRN: [id number]           Patient Information     Date Of Birth          1985        Visit Information        Provider Department      7/16/2018 3:15 PM Benson Cintron MD [facility]        Today's Diagnoses     Adrenal cortex cancer, right (H)    -  1    Adjustment disorder with mixed anxiety and depressed mood           Follow-ups after your visit        Additional Services     PSYCHOLOGY REFERRAL       Your provider has referred you to:  PREFERRED PROVIDERS:    Please be aware that coverage of these services is subject to the terms and limitations of your health insurance plan.  Call member services at your health plan with any benefit or coverage questions.      Please bring the following to your appointment:    >>   Any x-rays, CTs or MRIs which have been performed.  Contact the facility where they were done to arrange for  prior to your scheduled appointment.   >>   List of current medications   >>   This referral request   >>   Any documents/labs given to you for this referral                  Your next 10 appointments already scheduled     Jul 18, 2018  1:00 PM CDT   CONSULT with Dane Garcia MD   Radiation Oncology Clinic ([facility])    [facility]  1st Floor  [address] [id number] [phone]            Jul 22, 2018 11:30 AM CDT   MR BRAIN W/O & W CONTRAST with [id number]   [facility] MRI ([facility])    [address] [id number] [phone]           Take your medicines as usual, unless your doctor tells you not to. Bring a list of your current medicines to your exam (including vitamins, minerals and over-the-counter drugs).  You may or may not receive intravenous (IV) contrast for this exam pending the discretion of the Radiologist.  You do not need to do anything  special to prepare.  The MRI machine uses a strong magnet. Please wear clothes without metal (snaps, zippers). A sweatsuit works well, or we may give you a hospital gown.  Please remove any body piercings and hair extensions before you arrive. You will also remove watches, jewelry, hairpins, wallets, dentures, partial dental plates and hearing aids. You may wear contact lenses, and you may be able to wear your rings. We have a safe place to keep your personal items, but it is safer to leave them at home.  **IMPORTANT** THE INSTRUCTIONS BELOW ARE ONLY FOR THOSE PATIENTS WHO HAVE BEEN PRESCRIBED SEDATION OR GENERAL ANESTHESIA DURING THEIR MRI PROCEDURE:  IF YOUR DOCTOR PRESCRIBED ORAL SEDATION (take medicine to help you relax during your exam):   You must get the medicine from your doctor (oral medication) before you arrive. Bring the medicine to the exam. Do not take it at home. You ll be told when to take it upon arriving for your exam.   Arrive one hour early. Bring someone who can take you home after the test. Your medicine will make you sleepy. After the exam, you may not drive, take a bus or take a taxi by yourself.  IF YOUR DOCTOR PRESCRIBED IV SEDATION:   Arrive one hour early. Bring someone who can take you home after the test. Your medicine will make you sleepy. After the exam, you may not drive, take a bus or take a taxi by yourself.   No eating 6 hours before your exam. You may have clear liquids up until 4 hours before your exam. (Clear liquids include water, clear tea, black coffee and fruit juice without pulp.)  IF YOUR DOCTOR PRESCRIBED ANESTHESIA (be asleep for your exam):   Arrive 1 1/2 hours early. Bring someone who can take you home after the test. You may not drive, take a bus or take a taxi by yourself.   No eating 8 hours before your exam. You may have clear liquids up until 4 hours before your exam. (Clear liquids include water, clear tea, black coffee and fruit juice without pulp.)   You will  spend four to five hours in the recovery room.  Please call the Imaging Department at your exam site with any questions.            Jul 25, 2018 11:20 AM CDT   (Arrive by 11:05 AM)   RETURN ENDOCRINE with Roel Beverly MD   [facility] ([facility])    [address]  3rd Floor  [facility] [address]   [phone]            Jul 31, 2018  8:00 AM CDT   (Arrive by 7:45 AM)   NEW WITH ROOM with Ector Marcelino PsyD,  2 114 CONSULT [facility] ([facility])    [address]  [address]  [facility] [address]   [phone]            Aug 09, 2018 11:30 AM CDT   (Arrive by 11:00 AM)   PET ONCOLOGY WHOLE BODY with [id number]   [facility] ([facility])    [address] [address]   [phone]           Tell your doctor:   If there is any chance you may be pregnant or if you are breastfeeding.   If you have problems lying in small spaces (claustrophobia). If you do, your doctor may give you medicine to help you relax. If you have diabetes:   Have your exam early in the morning. Your blood glucose will go up as the day goes by.   Your glucose level must be 180 or less at the start of the exam. Please take any oral diabetic medication you need to ensure this blood glucose level is below 180, but no insulin 4 hours prior to the exam   If you are taking insulin in the morning take with breakfast by 6 am and schedule procedure between 12 and 2:15 pm.   If you are taking insulin at night take nightly dose, fast overnight, schedule procedure before 10 am.   If you take insulin both morning and night take morning dose by 6am and schedule procedure between 12 and 2:15 pm.   24 hours before your scan: Don t do any heavy exercise. (No jogging, aerobics or other workouts.) Exercise will make your pictures less accurate.  Patients should eat a low carb, high protein meal  7 hours (or the last meal you eat) before the scan. Low carb, high protein meals consist of lean meats, seafood, beans, soy, low-fat dairy, eggs, nuts & seeds. 6 hours before your scan:   Stop all food and liquids (except water).   Do not chew gum or suck on mints.   If you need to take medicine with food, you may take it with a few crackers.  Please call your Imaging Department at your exam site with any questions.            Aug 15, 2018  9:30 AM CDT   [facility] Draw with  [facility] DRAW   [facility] ([facility])    [address]  [address]  [place] [id number] [phone]            Aug 15, 2018 10:15 AM CDT   (Arrive by 10:00 AM)   Return Visit with Benson Cintron MD   [facility] ([facility])    [address]  [address]  [place] [id number] [phone]            Aug 23, 2018  7:20 AM CDT   (Arrive by 7:05 AM)   Return Visit with Warren Mansfield MD   [facility] ([facility])    [address]  4th Floor  [place] [address] [phone]              Future tests that were ordered for you today     Open Future Orders        Priority Expected Expires Ordered    MR Brain w/o & w Contrast Routine 7/16/2018 7/16/2019 7/16/2018            Who to contact     If you have questions or need follow up information about today's clinic visit or your schedule please contact [facility] directly at [phone].  Normal or non-critical lab and imaging results will be communicated to you by MyChart, letter or phone within 4 business days after the clinic has received the results. If you do not hear from us within 7 days, please contact the clinic through MyChart or phone. If you have a critical or abnormal lab result, we will notify you by phone as soon as possible.  Submit refill requests through  "[employer] or call your pharmacy and they will forward the refill request to us. Please allow 3 business days for your refill to be completed.          Additional Information About Your Visit        [employer] Information     [employer] gives you secure access to your electronic health record. If you see a primary care provider, you can also send messages to your care team and make appointments. If you have questions, please call your primary care clinic.  If you do not have a primary care provider, please call [phone] and they will assist you.        Care EveryWhere ID     This is your Care EveryWhere ID. This could be used by other organizations to access your [place] medical records  [id number]        Your Vitals Were     Pulse Temperature Respirations Height Last Period Pulse Oximetry    114 99.4  F (37.4  C) (Oral) 16 1.626 m (5' 4\") 06/20/2018 (Approximate) 98%    Breastfeeding? BMI (Body Mass Index)                No 27.29 kg/m2           Blood Pressure from Last 3 Encounters:   07/16/18 126/89   07/14/18 113/71   07/12/18 126/81    Weight from Last 3 Encounters:   07/16/18 72.1 kg (159 lb)   07/14/18 70.3 kg (155 lb)   07/12/18 72.2 kg (159 lb 3.2 oz)              We Performed the Following     PSYCHOLOGY REFERRAL          Today's Medication Changes          These changes are accurate as of 7/16/18  5:03 PM.  If you have any questions, ask your nurse or doctor.               Start taking these medicines.        Dose/Directions    mitotane 500 MG tablet CHEMO   Commonly known as:  LYSODREN   Used for:  Adrenal cortex cancer, right (H)   Started by:  Fannie Talbert RPH        Start at 500mg twice daily, then increase to 500mg TID then 500mg QID. Ultimate goal is to take 1gm QID.   Quantity:  120 tablet   Refills:  0       ondansetron 8 MG tablet   Commonly known as:  ZOFRAN   Used for:  Adrenal cortex cancer, right (H)   Started by:  Fannie Talbert RPH        Dose:  8 mg   Take 1 tablet (8 mg) by mouth every " 8 hours as needed for nausea   Quantity:  30 tablet   Refills:  0            Where to get your medicines      These medications were sent to [place], [place] - [address]  [address], [place] [address]    Hours:  TRANSPLANT PHONE NUMBER [phone] Phone:  [phone]     mitotane 500 MG tablet CHEMO    ondansetron 8 MG tablet                Primary Care Provider Office Phone # Fax #    [facility] [phone] [address] [address]        Equal Access to Services     MARQUITA VALADEZ : Hadii aad ku hadasho Soomaali, waaxda luqadaha, qaybta kaalmada adeegyada, coleman putnam . So [facility] [phone].    ATENCIÓN: Si habla español, tiene a lyman disposición servicios gratuitos de asistencia lingüística. Jemalame al [phone].    We comply with applicable federal civil rights laws and [place] laws. We do not discriminate on the basis of race, color, national origin, age, disability, sex, sexual orientation, or gender identity.            Thank you!     Thank you for choosing [facility]  for your care. Our goal is always to provide you with excellent care. Hearing back from our patients is one way we can continue to improve our services. Please take a few minutes to complete the written survey that you may receive in the mail after your visit with us. Thank you!             Your Updated Medication List - Protect others around you: Learn how to safely use, store and throw away your medicines at www.disposemymeds.org.          This list is accurate as of 7/16/18  5:03 PM.  Always use your most recent med list.                   Brand Name Dispense Instructions for use Diagnosis    acetaminophen 325 MG tablet    TYLENOL    150 tablet    Take 2 tablets (650 mg) by mouth every 4 hours as needed for other (multimodal surgical pain management along with  NSAIDS and opioid medication as indicated based on pain control and physical function.)    Adrenal mass, right (H), Acute post-operative pain       buPROPion 100 MG tablet    WELLBUTRIN     TAKE 1 TABLET (100 MG TOTAL) BY MOUTH 2 (TWO) TIMES A DAY.        calcium carbonate antacid 1000 MG Chew      Take 1 tablet by mouth every morning        enoxaparin 40 MG/0.4ML injection    LOVENOX    14 Syringe    Inject 0.4 mLs (40 mg) Subcutaneous daily    Adrenal mass, right (H), H/O MTHFR mutation       EPINEPHrine 0.3 MG/0.3ML injection 2-pack    EPIPEN/ADRENACLICK/or ANY BX GENERIC EQUIV     As directed for bee stings        FLUoxetine 20 MG capsule    PROzac     Take 20 mg by mouth every morning        * hydrocortisone 20 MG tablet    CORTEF    30 tablet    Take 1 tablet (20 mg) by mouth every morning (then 10mg at 2pm) until followup with Endocrinology    Adrenal mass, right (H)       * hydrocortisone 10 MG tablet    CORTEF    30 tablet    Take 1 tablet (10 mg) by mouth daily At 2pm until follow up with Endocrinology (total: 30mg daily)    Adrenal mass, right (H)       Lidocaine 4 % Patch    LIDOCARE    14 patch    Place 2 patches onto the skin every 24 hours    Adrenal mass, right (H), Acute post-operative pain       LORazepam 0.5 MG tablet    ATIVAN    30 tablet    Take 1 tablet (0.5 mg) by mouth every 8 hours as needed for anxiety    Adrenal mass (H), Anxiety       mitotane 500 MG tablet CHEMO    LYSODREN    120 tablet    Start at 500mg twice daily, then increase to 500mg TID then 500mg QID. Ultimate goal is to take 1gm QID.    Adrenal cortex cancer, right (H)       ondansetron 8 MG tablet    ZOFRAN    30 tablet    Take 1 tablet (8 mg) by mouth every 8 hours as needed for nausea    Adrenal cortex cancer, right (H)       ONE-A-DAY WOMENS PO      Take 2 tablets by mouth every morning        oxyCODONE IR 10 MG tablet    ROXICODONE    22 tablet    Take 0.5-1 tablets (5-10 mg) by mouth every 4 hours as needed for moderate  to severe pain    Adrenal mass, right (H), Acute post-operative pain       oxyCODONE-acetaminophen 5-325 MG per tablet    PERCOCET    12 tablet    Take 1-2 tablets by mouth every 4 hours as needed for pain        VITAMIN D-1000 MAX ST 1000 units Tabs   Generic drug:  cholecalciferol      Take 2,000 Units by mouth every morning        * Notice:  This list has 2 medication(s) that are the same as other medications prescribed for you. Read the directions carefully, and ask your doctor or other care provider to review them with you.

## 2018-07-16 NOTE — LETTER
"7/16/2018       RE: Suzy Rodríguez  [address]  [facility] [address]     Dear Colleague,    Thank you for referring your patient, Suzy Rodríguez, to the [facility]. Please see a copy of my visit note below.    MEDICAL ONCOLOGY NEW PATIENT CLINIC NOTE    ENCOUNTER DATE: 7/18/2018  REFERRING PROVIDER: Warren Mansfield MD from [facility] Urologic Oncology clinic.     REASON FOR CURRENT VISIT: Adjuvant therapy options for newly diagnosed adrenocortical carcinoma.     HISTORY OF PRESENT ILLNESS:  Ms. 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  Her oncologic history is as under.  She is here for discussion of adjuvant therapy options. Her  accompanies her for this visit.      The patient has had quite a few symptoms because of the adrenocortical carcinoma including weight gain, facial swelling, mood changes, excessive facial hair growth, and irregular periods.  She also has episodes of headache and blurring vision with normal blood pressure.  She has recovered well from the surgery.      ONCOLOGIC HISTORY:   1. 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   - CT abdomen and pelvis stone protocol without contrast 5 [id number] showed \"9.2 x 8 cm heterogeneous right upper quadrant mass with small foci of calcification within it which is likely arising from the adrenal gland though inseparable from liver and upper pole of right kidney. It is incompletely evaluated on this noncontrast study. 3 x 2.6 cm mass right lobe of liver on image #85 also incompletely evaluated. 6 x 4 x 4 mm upper third left ureteral obstructing stone with mild to moderate secondary hydronephrosis. Collection of stones at lower " "pole left kidney extending over an area of approximately 6 x 7 x 4 mm. Additional nonobstructing 1 mm stone upper pole left kidney. 2 mm nonobstructing stone noted upper pole right kidney with no hydronephrosis on the right. Postop change consistent with gastric bypass. Noncontrast images of spleen, left adrenal gland, gallbladder, and pancreas unremarkable. No aneurysm. No adenopathy.\"  - Seen by Dr. Delvalle at [facility] Urology clinic. Referred to Dr. Alvino Patel and then Dr. Warren Mansfield.  - Endocrinology team directed workup showed high DHEAS level of 740 pre-surgery.   - Right open adrenalectomy and hepatic mobilization performed by Dr. Warren Mansfield on 6/25/2018. Pathology showed adrenocortical carcinoma measuring 11.3 cm, weighing 413 g with extension into or through the adrenal capsule negative lymphovascular invasion, tumor necrosis present, margins involved by tumor, no regional lymph nodes identified, pathologic stage T2 NX.  - DHEAS normalized postsurgery.     REVIEW OF SYSTEMS: 14 point ROS negative other than the symptoms noted above in the HPI.    PAST MEDICAL AND SURGICAL HISTORY:   1. Right-sided adrenocortical carcinoma.  2. MHTFR mutation with h.o mutiple miscarriages.  3. Ovarian cysts diagnosed in 2011.  4. H/o gastric bypass in 2016 for obesity.    SOCIAL HISTORY:   Social History   Substance Use Topics     Smoking status: Never Smoker     Smokeless tobacco: Never Used     Alcohol use Yes      Comment: occ.     FAMILY HISTORY:   None.    ALLERGIES:   Allergies   Allergen Reactions     Bee Venom Swelling     Ibuprofen Other (See Comments), Hives and Swelling     CURRENT MEDICATIONS:   Current Outpatient Prescriptions:      acetaminophen (TYLENOL) 325 MG tablet, Take 2 tablets (650 mg) by mouth every 4 hours as needed for other (multimodal surgical pain management along with NSAIDS and opioid medication as indicated based on pain control and physical function.), Disp: 150 tablet, " Rfl: 0     buPROPion (WELLBUTRIN) 100 MG tablet, TAKE 1 TABLET (100 MG TOTAL) BY MOUTH 2 (TWO) TIMES A DAY., Disp: , Rfl: 3     calcium carbonate antacid 1000 MG CHEW, Take 1 tablet by mouth every morning , Disp: , Rfl:      cholecalciferol (VITAMIN D-1000 MAX ST) 1000 units TABS, Take 2,000 Units by mouth every morning , Disp: , Rfl:      enoxaparin (LOVENOX) 40 MG/0.4ML injection, Inject 0.4 mLs (40 mg) Subcutaneous daily, Disp: 14 Syringe, Rfl: 0     FLUoxetine (PROZAC) 20 MG capsule, Take 20 mg by mouth every morning , Disp: , Rfl:      hydrocortisone (CORTEF) 10 MG tablet, Take 1 tablet (10 mg) by mouth daily At 2pm until follow up with Endocrinology (total: 30mg daily), Disp: 30 tablet, Rfl: 1     hydrocortisone (CORTEF) 20 MG tablet, Take 1 tablet (20 mg) by mouth every morning (then 10mg at 2pm) until followup with Endocrinology, Disp: 30 tablet, Rfl: 1     Lidocaine (LIDOCARE) 4 % Patch, Place 2 patches onto the skin every 24 hours, Disp: 14 patch, Rfl: 0     Multiple Vitamins-Calcium (ONE-A-DAY WOMENS PO), Take 2 tablets by mouth every morning , Disp: , Rfl:      oxyCODONE IR (ROXICODONE) 10 MG tablet, Take 0.5-1 tablets (5-10 mg) by mouth every 4 hours as needed for moderate to severe pain, Disp: 22 tablet, Rfl: 0     oxyCODONE-acetaminophen (PERCOCET) 5-325 MG per tablet, Take 1-2 tablets by mouth every 4 hours as needed for pain, Disp: 12 tablet, Rfl: 0     diazepam (VALIUM) 2 MG tablet, Take, Disp: 5 tablet, Rfl: 0     EPINEPHrine (EPIPEN/ADRENACLICK/OR ANY BX GENERIC EQUIV) 0.3 MG/0.3ML injection 2-pack, As directed for bee stings, Disp: , Rfl:      LORazepam (ATIVAN) 0.5 MG tablet, Take 1 tablet (0.5 mg) by mouth every 8 hours as needed for anxiety, Disp: 30 tablet, Rfl: 0     mitotane (LYSODREN) 500 MG tablet CHEMO, Take 500mg twice daily for 2 weeks, then increase to 500mg TID for 2 weeks., Disp: 70 tablet, Rfl: 0     mitotane (LYSODREN) 500 MG tablet CHEMO, Start at 500mg twice daily, then  "increase to 500mg TID then 500mg QID. Ultimate goal is to take 1gm QID., Disp: 120 tablet, Rfl: 0     ondansetron (ZOFRAN) 8 MG tablet, Take 1 tablet (8 mg) by mouth every 8 hours as needed for nausea, Disp: 30 tablet, Rfl: 0    PHYSICAL EXAMINATION:  Vital signs: /89  Pulse 114  Temp 99.4  F (37.4  C) (Oral)  Resp 16  Ht 1.626 m (5' 4\")  Wt 72.1 kg (159 lb)  LMP 06/20/2018 (Approximate)  SpO2 98%  Breastfeeding? No  BMI 27.29 kg/m2  ECOG performance status of 0. Fatigue 0.  GENERAL: Well-nourished healthy-appearing lady in chair, no acute distress.   HEENT: No icterus, no pallor. Moist mucous membranes. Oropharynx is clear.   NECK: Supple, no JVD/LAD.  LUNGS: Clear to ausculation bilaterally, normal work of breathing.   CARDIOVASCULAR: Regular rate and rhythm, no murmurs, gallops or rubs.   ABDOMEN: Soft, nontender and nondistended, no palpable masses, bowel sounds present.  EXTREMITIES: No cyanosis, no clubbing, no edema.   NEUROLOGIC: No focal deficits, CN 2-12 intact.  LYMPH NODE EXAM: No palpable adenopathy - cervical, axillary or inguinal.     LABORATORY DATA:  CBC RESULTS:   Recent Labs   Lab Test  07/14/18   1508   WBC  7.6   RBC  3.20*   HGB  8.3*   HCT  28.3*   MCV  88   MCH  25.9*   MCHC  29.3*   RDW  14.9   PLT  464*       Recent Labs   Lab Test  07/14/18   1508  07/01/18   0724   NA  136  140   POTASSIUM  3.5  3.7   CHLORIDE  100  105   CO2  28  28   ANIONGAP  8  6   GLC  103*  74   BUN  6*  6*   CR  0.80  0.54   SHASHA  8.7  8.2*     Liver Function Studies -   Recent Labs   Lab Test  07/14/18   1508   PROTTOTAL  7.1   ALBUMIN  3.4   BILITOTAL  0.5   ALKPHOS  141   AST  25   ALT  39     IMAGING STUDIES:  CT C/A/P with contrast 6/29/18: \"1. Right adrenalectomy. There is subtle irregularity along the posterior right inferior hemidiaphragm near the surgical site that is at least moderately suspicious for potential focal diaphragmatic injury in this area. There is small right pneumothorax and " "small gas at the most inferior mediastinum that may relate to this finding. 2. Focal loculated fluid and collections of extraluminal gas at the adrenalectomy site noted as above. Small ascites also noted. 3. Small right pleural effusion. Moderate right lung base atelectasis. 4. No other acute abnormality is identified. No pulmonary embolism or acute abnormality. 5. Previously noted small pulmonary nodules are difficult to visualize on this examination, but surveillance imaging should be considered on the basis of the prior CT.\"    ASSESSMENT AND PLAN: A 32-year-old lady with recently diagnosed right-sided functioning adrenocortical carcinoma, who is here for discussion of adjuvant therapy options  - I reviewed the available diagnostic data at length with patient and her . Explained that adrenal cortical tumors are rare but given her presentation and tumor characteristics including invasion of the adrenal capsule, high mitotic rate, possibly positive margins, and high Ki67, her rate of recurrence can be as high as 40%.  - Patient is scheduled to see radiation oncology in the coming weeks and I encouraged her to keep that appointment. I did explain that even after local radiation therapy, the patient is at risk for systemic recurrence.  -We discussed ways to decrease recurrence risk, of which the most established option is adjuvant mitotane therapy for upto 5 years, which can result in upto 50% decrease in recurrence risk.  - Reviewed the side effect profile of mitotane including fatigue, nausea vomiting, depression, dizziness, vertigo, flushing, hypertension, orthostatic hypotension, adrenocortical insufficiency, decreased libido, hypercholesterolemia, hypertriglyceridemia, ovarian cyst, hematuria, neutropenia, hepatitis, risk of adrenocortical crisis in the setting of sepsis or stress etc. Information printout was provided.  - She was just get started with adjuvant mitotane therapy ASAP. A prior authorization " was placed.   - Our plan would be to start her on 0.5 g b.i.d. and then titrate up to 0.5 gm t.i.d. in 2 weeks and then 0.5gm q.i.d. in a month. Ultimately the goal would be to get her as close to 4 g a day as possible. This can be taken with radiation therapy as well.  - Monthly evaluation with labs including DHEAS while on therapy. Will consider repeating the comprehensive endocrine panel every 6 months for first two years.  - Restaging PET/CT scan has been ordered by Dr. Mansfield to be done in early August.  - To workup the patient's reported lightheadedness and headache, I have ordered an MRI brain with contrast to be done in the next few weeks.  - Cancer Center psychology referral placed on patient's request.    She'll return to see me in 4 weeks.    Patient and family given opportunity to ask questions, which were answered to their satisfaction. They're in complete agreement as planned.    I appreciate Dr. Mansfield's referral to my clinic and will send him this note for review.    BILLIN    Benson Cintron M.D.  . Professor of Medicine  Genitourinary Oncology  Division of Hematology, Oncology & Transplantation  [facility]    CC: Warren Mansfield MD

## 2018-07-17 ENCOUNTER — MYC REFILL (OUTPATIENT)
Dept: UROLOGY | Facility: CLINIC | Age: 33
End: 2018-07-17

## 2018-07-17 DIAGNOSIS — C74.01 ADRENAL CORTEX CANCER, RIGHT (H): ICD-10-CM

## 2018-07-17 DIAGNOSIS — F41.9 ANXIETY: ICD-10-CM

## 2018-07-17 DIAGNOSIS — E27.8 ADRENAL MASS (H): ICD-10-CM

## 2018-07-17 DIAGNOSIS — C74.01 ADRENAL CORTEX CANCER, RIGHT (H): Primary | ICD-10-CM

## 2018-07-17 NOTE — PROGRESS NOTES
Met with patient, and spouse, to discuss chemotherapy and resources available at the [facility]. Provided patient with  My Cancer Guidebook . Reviewed administration, side effects and ongoing symptom support management. Provided phone numbers for triage and after hours care. Discussed that chemo may be delayed due to blood counts, schedule or patient s need to modify. Reviewed most concerning symptoms that warrant an immediate call to the clinic nurse line. Oral chemotherapy pharmacist notified that pt will need teaching.

## 2018-07-18 ENCOUNTER — TELEPHONE (OUTPATIENT)
Dept: CALL CENTER | Age: 33
End: 2018-07-18

## 2018-07-18 ENCOUNTER — COMMUNICATION - HEALTHEAST (OUTPATIENT)
Dept: SCHEDULING | Facility: CLINIC | Age: 33
End: 2018-07-18

## 2018-07-18 ENCOUNTER — OFFICE VISIT - HEALTHEAST (OUTPATIENT)
Dept: FAMILY MEDICINE | Facility: CLINIC | Age: 33
End: 2018-07-18

## 2018-07-18 DIAGNOSIS — C74.00 ADRENAL CORTICAL ADENOCARCINOMA, UNSPECIFIED LATERALITY (H): ICD-10-CM

## 2018-07-18 DIAGNOSIS — F41.9 ANXIETY: Primary | ICD-10-CM

## 2018-07-18 DIAGNOSIS — R10.9 FLANK PAIN: ICD-10-CM

## 2018-07-18 LAB
ALBUMIN UR-MCNC: ABNORMAL MG/DL
APPEARANCE UR: CLEAR
BILIRUB UR QL STRIP: NEGATIVE
COLOR UR AUTO: YELLOW
GLUCOSE UR STRIP-MCNC: NEGATIVE MG/DL
HGB UR QL STRIP: ABNORMAL
KETONES UR STRIP-MCNC: NEGATIVE MG/DL
LEUKOCYTE ESTERASE UR QL STRIP: ABNORMAL
NITRATE UR QL: NEGATIVE
PH UR STRIP: 6.5 [PH] (ref 5–8)
SP GR UR STRIP: 1.03 (ref 1–1.03)
UROBILINOGEN UR STRIP-ACNC: ABNORMAL

## 2018-07-18 RX ORDER — DIAZEPAM 2 MG
TABLET ORAL
Qty: 5 TABLET | Refills: 0
Start: 2018-07-18 | End: 2018-08-20

## 2018-07-18 RX ORDER — LORAZEPAM 0.5 MG/1
0.5 TABLET ORAL EVERY 8 HOURS PRN
Qty: 30 TABLET | Refills: 0 | Status: SHIPPED | OUTPATIENT
Start: 2018-07-18 | End: 2018-11-26

## 2018-07-18 ASSESSMENT — MIFFLIN-ST. JEOR: SCORE: 1385.33

## 2018-07-18 NOTE — TELEPHONE ENCOUNTER
Message from [facility]:  Devon Corrales, CMA Tue Jul 17, 2018 3:54 PM    Mike Conroy,  This is the message you wanted me to route to you for Dr. Mansfield.    Thanks, Leslie'  ----- Message -----   From: Suzy Rodríguez   Sent: 7/17/2018 1:46 PM   To: Urology & [facility] Triage-  Subject: Medication Renewal Request     Original authorizing provider: MD Suzy Daugherty would like a refill of the following medications:  LORazepam (ATIVAN) 0.5 MG tablet [Warren Mansfield MD]    Preferred pharmacy: [facility] [place], [place] - [address]    Comment:

## 2018-07-18 NOTE — TELEPHONE ENCOUNTER
33y/o female with hx of adrenal cortical carcinoma s/p open right adrenalectomy on 6/25/18. She was seen in the ER on 7/14/18 for right lower back pain.  She continues with the same issues-She is currently on her way to see her primary care but wants Dr. Mansfield to be made aware of what is going on. I will forward information to urology pool.

## 2018-07-18 NOTE — PROGRESS NOTES
"MEDICAL ONCOLOGY NEW PATIENT CLINIC NOTE    ENCOUNTER DATE: 7/18/2018  REFERRING PROVIDER: Warren Mansfield MD from [facility] Urologic Oncology clinic.     REASON FOR CURRENT VISIT: Adjuvant therapy options for newly diagnosed adrenocortical carcinoma.     HISTORY OF PRESENT ILLNESS:  Ms. 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  Her oncologic history is as under.  She is here for discussion of adjuvant therapy options. Her  accompanies her for this visit.      The patient has had quite a few symptoms because of the adrenocortical carcinoma including weight gain, facial swelling, mood changes, excessive facial hair growth, and irregular periods.  She also has episodes of headache and blurring vision with normal blood pressure.  She has recovered well from the surgery.      ONCOLOGIC HISTORY:   1. 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   - CT abdomen and pelvis stone protocol without contrast 5 [id number] showed \"9.2 x 8 cm heterogeneous right upper quadrant mass with small foci of calcification within it which is likely arising from the adrenal gland though inseparable from liver and upper pole of right kidney. It is incompletely evaluated on this noncontrast study. 3 x 2.6 cm mass right lobe of liver on image #85 also incompletely evaluated. 6 x 4 x 4 mm upper third left ureteral obstructing stone with mild to moderate secondary hydronephrosis. Collection of stones at lower pole left kidney extending over an area of approximately 6 x 7 x 4 mm. Additional nonobstructing 1 mm stone upper pole left kidney. 2 mm nonobstructing stone noted upper pole right kidney with no hydronephrosis on the right. Postop " "change consistent with gastric bypass. Noncontrast images of spleen, left adrenal gland, gallbladder, and pancreas unremarkable. No aneurysm. No adenopathy.\"  - Seen by Dr. Delvalle at [facility] Urology clinic. Referred to Dr. Alvino Patel and then Dr. Warren Mansfield.  - Endocrinology team directed workup showed high DHEAS level of 740 pre-surgery.   - Right open adrenalectomy and hepatic mobilization performed by Dr. Warren Mansfield on 6/25/2018. Pathology showed adrenocortical carcinoma measuring 11.3 cm, weighing 413 g with extension into or through the adrenal capsule negative lymphovascular invasion, tumor necrosis present, margins involved by tumor, no regional lymph nodes identified, pathologic stage T2 NX.  - DHEAS normalized postsurgery.     REVIEW OF SYSTEMS: 14 point ROS negative other than the symptoms noted above in the HPI.    PAST MEDICAL AND SURGICAL HISTORY:   1. Right-sided adrenocortical carcinoma.  2. MHTFR mutation with h.o mutiple miscarriages.  3. Ovarian cysts diagnosed in 2011.  4. H/o gastric bypass in 2016 for obesity.    SOCIAL HISTORY:   Social History   Substance Use Topics     Smoking status: Never Smoker     Smokeless tobacco: Never Used     Alcohol use Yes      Comment: occ.     FAMILY HISTORY:   None.    ALLERGIES:   Allergies   Allergen Reactions     Bee Venom Swelling     Ibuprofen Other (See Comments), Hives and Swelling     CURRENT MEDICATIONS:   Current Outpatient Prescriptions:      acetaminophen (TYLENOL) 325 MG tablet, Take 2 tablets (650 mg) by mouth every 4 hours as needed for other (multimodal surgical pain management along with NSAIDS and opioid medication as indicated based on pain control and physical function.), Disp: 150 tablet, Rfl: 0     buPROPion (WELLBUTRIN) 100 MG tablet, TAKE 1 TABLET (100 MG TOTAL) BY MOUTH 2 (TWO) TIMES A DAY., Disp: , Rfl: 3     calcium carbonate antacid 1000 MG CHEW, Take 1 tablet by mouth every morning , Disp: , Rfl:      " cholecalciferol (VITAMIN D-1000 MAX ST) 1000 units TABS, Take 2,000 Units by mouth every morning , Disp: , Rfl:      enoxaparin (LOVENOX) 40 MG/0.4ML injection, Inject 0.4 mLs (40 mg) Subcutaneous daily, Disp: 14 Syringe, Rfl: 0     FLUoxetine (PROZAC) 20 MG capsule, Take 20 mg by mouth every morning , Disp: , Rfl:      hydrocortisone (CORTEF) 10 MG tablet, Take 1 tablet (10 mg) by mouth daily At 2pm until follow up with Endocrinology (total: 30mg daily), Disp: 30 tablet, Rfl: 1     hydrocortisone (CORTEF) 20 MG tablet, Take 1 tablet (20 mg) by mouth every morning (then 10mg at 2pm) until followup with Endocrinology, Disp: 30 tablet, Rfl: 1     Lidocaine (LIDOCARE) 4 % Patch, Place 2 patches onto the skin every 24 hours, Disp: 14 patch, Rfl: 0     Multiple Vitamins-Calcium (ONE-A-DAY WOMENS PO), Take 2 tablets by mouth every morning , Disp: , Rfl:      oxyCODONE IR (ROXICODONE) 10 MG tablet, Take 0.5-1 tablets (5-10 mg) by mouth every 4 hours as needed for moderate to severe pain, Disp: 22 tablet, Rfl: 0     oxyCODONE-acetaminophen (PERCOCET) 5-325 MG per tablet, Take 1-2 tablets by mouth every 4 hours as needed for pain, Disp: 12 tablet, Rfl: 0     diazepam (VALIUM) 2 MG tablet, Take, Disp: 5 tablet, Rfl: 0     EPINEPHrine (EPIPEN/ADRENACLICK/OR ANY BX GENERIC EQUIV) 0.3 MG/0.3ML injection 2-pack, As directed for bee stings, Disp: , Rfl:      LORazepam (ATIVAN) 0.5 MG tablet, Take 1 tablet (0.5 mg) by mouth every 8 hours as needed for anxiety, Disp: 30 tablet, Rfl: 0     mitotane (LYSODREN) 500 MG tablet CHEMO, Take 500mg twice daily for 2 weeks, then increase to 500mg TID for 2 weeks., Disp: 70 tablet, Rfl: 0     mitotane (LYSODREN) 500 MG tablet CHEMO, Start at 500mg twice daily, then increase to 500mg TID then 500mg QID. Ultimate goal is to take 1gm QID., Disp: 120 tablet, Rfl: 0     ondansetron (ZOFRAN) 8 MG tablet, Take 1 tablet (8 mg) by mouth every 8 hours as needed for nausea, Disp: 30 tablet, Rfl:  "0    PHYSICAL EXAMINATION:  Vital signs: /89  Pulse 114  Temp 99.4  F (37.4  C) (Oral)  Resp 16  Ht 1.626 m (5' 4\")  Wt 72.1 kg (159 lb)  LMP 06/20/2018 (Approximate)  SpO2 98%  Breastfeeding? No  BMI 27.29 kg/m2  ECOG performance status of 0. Fatigue 0.  GENERAL: Well-nourished healthy-appearing lady in chair, no acute distress.   HEENT: No icterus, no pallor. Moist mucous membranes. Oropharynx is clear.   NECK: Supple, no JVD/LAD.  LUNGS: Clear to ausculation bilaterally, normal work of breathing.   CARDIOVASCULAR: Regular rate and rhythm, no murmurs, gallops or rubs.   ABDOMEN: Soft, nontender and nondistended, no palpable masses, bowel sounds present.  EXTREMITIES: No cyanosis, no clubbing, no edema.   NEUROLOGIC: No focal deficits, CN 2-12 intact.  LYMPH NODE EXAM: No palpable adenopathy - cervical, axillary or inguinal.     LABORATORY DATA:  CBC RESULTS:   Recent Labs   Lab Test  07/14/18   1508   WBC  7.6   RBC  3.20*   HGB  8.3*   HCT  28.3*   MCV  88   MCH  25.9*   MCHC  29.3*   RDW  14.9   PLT  464*       Recent Labs   Lab Test  07/14/18   1508  07/01/18   0724   NA  136  140   POTASSIUM  3.5  3.7   CHLORIDE  100  105   CO2  28  28   ANIONGAP  8  6   GLC  103*  74   BUN  6*  6*   CR  0.80  0.54   SHASHA  8.7  8.2*     Liver Function Studies -   Recent Labs   Lab Test  07/14/18   1508   PROTTOTAL  7.1   ALBUMIN  3.4   BILITOTAL  0.5   ALKPHOS  141   AST  25   ALT  39     IMAGING STUDIES:  CT C/A/P with contrast 6/29/18: \"1. Right adrenalectomy. There is subtle irregularity along the posterior right inferior hemidiaphragm near the surgical site that is at least moderately suspicious for potential focal diaphragmatic injury in this area. There is small right pneumothorax and small gas at the most inferior mediastinum that may relate to this finding. 2. Focal loculated fluid and collections of extraluminal gas at the adrenalectomy site noted as above. Small ascites also noted. 3. Small right " "pleural effusion. Moderate right lung base atelectasis. 4. No other acute abnormality is identified. No pulmonary embolism or acute abnormality. 5. Previously noted small pulmonary nodules are difficult to visualize on this examination, but surveillance imaging should be considered on the basis of the prior CT.\"    ASSESSMENT AND PLAN: A 32-year-old lady with recently diagnosed right-sided functioning adrenocortical carcinoma, who is here for discussion of adjuvant therapy options  - I reviewed the available diagnostic data at length with patient and her . Explained that adrenal cortical tumors are rare but given her presentation and tumor characteristics including invasion of the adrenal capsule, high mitotic rate, possibly positive margins, and high Ki67, her rate of recurrence can be as high as 40%.  - Patient is scheduled to see radiation oncology in the coming weeks and I encouraged her to keep that appointment. I did explain that even after local radiation therapy, the patient is at risk for systemic recurrence.  -We discussed ways to decrease recurrence risk, of which the most established option is adjuvant mitotane therapy for upto 5 years, which can result in upto 50% decrease in recurrence risk.  - Reviewed the side effect profile of mitotane including fatigue, nausea vomiting, depression, dizziness, vertigo, flushing, hypertension, orthostatic hypotension, adrenocortical insufficiency, decreased libido, hypercholesterolemia, hypertriglyceridemia, ovarian cyst, hematuria, neutropenia, hepatitis, risk of adrenocortical crisis in the setting of sepsis or stress etc. Information printout was provided.  - She was just get started with adjuvant mitotane therapy ASA. A prior authorization was placed.   - Our plan would be to start her on 0.5 g b.i.d. and then titrate up to 0.5 gm t.i.d. in 2 weeks and then 0.5gm q.i.d. in a month. Ultimately the goal would be to get her as close to 4 g a day as possible. " This can be taken with radiation therapy as well.  - Monthly evaluation with labs including DHEAS while on therapy. Will consider repeating the comprehensive endocrine panel every 6 months for first two years.  - Restaging PET/CT scan has been ordered by Dr. Mansfield to be done in early August.  - To workup the patient's reported lightheadedness and headache, I have ordered an MRI brain with contrast to be done in the next few weeks.  - Cancer Center psychology referral placed on patient's request.    She'll return to see me in 4 weeks.    Patient and family given opportunity to ask questions, which were answered to their satisfaction. They're in complete agreement as planned.    I appreciate Dr. Mansfield's referral to my clinic and will send him this note for review.    BILLIN    Benson Cintron M.D.  . Professor of Medicine  Genitourinary Oncology  Division of Hematology, Oncology & Transplantation  [facility]    CC: Warren Mansfield MD

## 2018-07-18 NOTE — TELEPHONE ENCOUNTER
Ok to refill per Dr Mansfield.  RX called to [facility].    MyChart sent to patient informing of approved refill.      Marycarmen Martel, RN, BSN  Urology Patient Care Supervisor

## 2018-07-19 ENCOUNTER — CARE COORDINATION (OUTPATIENT)
Dept: ONCOLOGY | Facility: CLINIC | Age: 33
End: 2018-07-19

## 2018-07-19 ENCOUNTER — TELEPHONE (OUTPATIENT)
Dept: ENDOCRINOLOGY | Facility: CLINIC | Age: 33
End: 2018-07-19

## 2018-07-19 ENCOUNTER — TELEPHONE (OUTPATIENT)
Dept: ONCOLOGY | Facility: CLINIC | Age: 33
End: 2018-07-19

## 2018-07-19 DIAGNOSIS — C74.01 ADRENAL CORTEX CANCER, RIGHT (H): Primary | ICD-10-CM

## 2018-07-19 DIAGNOSIS — R19.7 DIARRHEA: ICD-10-CM

## 2018-07-19 NOTE — PROGRESS NOTES
TC to pt per Dr. Cintron:     RE: Condition update  Received: Today       Benson Cintron MD Jankovich, Diana, LINDA                   Ok to test for C diff at [facility]. Did she start Mitotane yet? If diarrhea started after Mitotane, it is medication-related and will need imodium.   AR        RE: Dosing on Lysodren/Mitotane  Received: Today       Benson Cintron MD Kitzerow, Kayla; P Csc Oral Chemo Pharmacists Cc: Padmini Infante RN                   Great! Thanks Lien.     Padmini - even though she'll get the Mitotane today, ask her to not start until diarrhea workup is completed (when you call her about Cdiff testing). Mitotane can make it worse.     AR       Spoke with pt's , Ashvin, and explained the plan per above. Ashvin stated he will call the [facility] clinic to see if they have c-diff test kits to  as this is closest to their home, and will try get specimen submitted before the end of the day today. Ashvin stated understanding of all and will call the clinic with any further questions or concerns.

## 2018-07-19 NOTE — PROGRESS NOTES
TC from pt to review upcoming appointments. Pt also expressed concern about recent onset of low back pain that radiates to the right side. Pt states it's not in the kidney area, but did have an US of her kidneys at the [place] [facility] and kidney stones were ruled out. She stated she went to her PCP yesterday with the same complaints, but they weren't able to offer her anything excepting thinking it may be dehydration. Reports having 10 loose stools yesterday (states this is more than typical), but none today. Also states that her back pain hasn't come on yet today. She did eat a 1/2 bowl of oatmeal and drank 16 oz of water today. States BP was low for her yesterday at her appointment. Encouraged pt continue to push fluids and try keep eating as normal, to monitor for a fever (denies fever up to this point), and to call back if s/s worsen.     Message sent to Dr. Cintron asking if he would like a c-diff test done and to notify writer so this may be coordinated at [facility]. Await response.

## 2018-07-19 NOTE — TELEPHONE ENCOUNTER
Had surgery on 06/25 adrenal mass removed,   hydrocortisone (CORTEF) 20 MG tablet 30 tablet 1 6/28/2018  No   Sig - Route: Take 1 tablet (20 mg) by mouth every morning (then 10mg at 2pm) until followup with Endocrinology - Oral     Began Rx and has been experiencing new symptoms: - intermittent, severe, stabbing, lower back pain - , went to hospital did US, no kidney stones, went to PCP yesterday, he thought maybe dehydration, had severe nausea, vomiting and diarrhea yesterday. - States symptoms comes and goes, starting to subside yesterday evening, currently able to keep liquids down, able to eat now. Advised Pt to go to ER if symptoms persist or worsen, has appt w/ Dr Beverly on 07/25 to follow-up. Advised Pt to contact Rx ordering provider, but also would forward to Dr Jaquez for clarification.

## 2018-07-20 ENCOUNTER — TELEPHONE (OUTPATIENT)
Dept: PHARMACY | Facility: CLINIC | Age: 33
End: 2018-07-20

## 2018-07-20 ENCOUNTER — TELEPHONE (OUTPATIENT)
Dept: ENDOCRINOLOGY | Facility: CLINIC | Age: 33
End: 2018-07-20

## 2018-07-20 DIAGNOSIS — R19.7 DIARRHEA: ICD-10-CM

## 2018-07-20 LAB
BACTERIA SPEC CULT: NORMAL
C DIFF TOX B STL QL: NEGATIVE
SPECIMEN SOURCE: NORMAL

## 2018-07-20 PROCEDURE — 87493 C DIFF AMPLIFIED PROBE: CPT | Performed by: INTERNAL MEDICINE

## 2018-07-20 NOTE — TELEPHONE ENCOUNTER
----- Message from Veena Jaquez MD sent at 7/19/2018  7:19 PM CDT -----  Regarding: RE: Dr Beverly Pt, requesting clarification  20 mg HC in am and 10 mg at 2 pm should be fine until she is seen in the clinic. I looked over the labs and they were unremarkable. If the pain and the GI symptoms get worse, she should be reeval in ER.       ----- Message -----     From: Ivon Silverman RN     Sent: 7/19/2018   9:55 AM       To: Veena Jaquez MD  Subject: Dr Beverly Pt, requesting clarification           Had surgery on 06/25 adrenal mass removed, had Rx ordered with follow up w/ ENDO:  hydrocortisone (CORTEF) 20 MG tablet 30 tablet 1 6/28/2018  No  Sig - Route: Take 1 tablet (20 mg) by mouth every morning (then 10mg at 2pm) until followup with Endocrinology - Oral    Began Rx and has been experiencing new symptoms: - intermittent, severe, stabbing, lower back pain - , went to hospital did US, no kidney stones, went to PCP yesterday, he thought maybe dehydration, had severe nausea, vomiting and diarrhea yesterday. - States symptoms comes and goes, starting to subside yesterday evening, currently able to keep liquids down, able to eat now. Advised Pt to go to ER if symptoms persist or worsen, has appt w/ Dr Beverly on 07/25 to follow-up. Advised Pt to contact Rx ordering provider, but also would forward to Dr Jaquez for clarification.

## 2018-07-20 NOTE — ORAL ONC MGMT
"Oral Chemotherapy Monitoring Program    Primary Oncologist: Dr. Cintron  Primary Oncology Clinic: [facility]  Cancer Diagnosis: adrenocortical carcinoma    Drug: Mitotane 500mg BID for 2 weeks, then 500mg TID for 2 weeks, then 500mg QID, ultimate goal being 1000mg QID  Start Date: Drug to be delivered Tuesday 7/24, per patient Dr. Cintron will let the patient know when to start  Dose is appropriate for patients: Hepatic / Renal Function   Expected duration of therapy: Until disease progression or unacceptable toxicity    Drug Interaction Assessment: No drug interactions identified    Lab Monitoring Plan  Per Provider  Subjective/Objective:  Suzy Rodríguez is a 32 year old female contacted by phone for an initial visit for oral chemotherapy education.      ORAL CHEMOTHERAPY 7/20/2018   Drug Name Lysodren (Mitotane)   Current Dosage 500mg   Current Schedule Other   Cycle Details Continuous       Vitals:  BP:   BP Readings from Last 1 Encounters:   07/16/18 126/89     Wt Readings from Last 1 Encounters:   07/16/18 72.1 kg (159 lb)     Estimated body surface area is 1.8 meters squared as calculated from the following:    Height as of 7/16/18: 1.626 m (5' 4\").    Weight as of 7/16/18: 72.1 kg (159 lb).      Labs:  _  Result Component Current Result Ref [place]   Sodium 136 (7/14/2018) 133 - 144 mmol/L     _  Result Component Current Result Ref [place]   Potassium 3.5 (7/14/2018) 3.4 - 5.3 mmol/L     _  Result Component Current Result Ref [place]   Calcium 8.7 (7/14/2018) 8.5 - 10.1 mg/dL     No results found for Mag within last 30 days.     No results found for Phos within last 30 days.     _  Result Component Current Result Ref [place]   Albumin 3.4 (7/14/2018) 3.4 - 5.0 g/dL     _  Result Component Current Result Ref [place]   Urea Nitrogen 6 (L) (7/14/2018) 7 - 30 mg/dL     _  Result Component Current Result Ref [place]   Creatinine 0.80 (7/14/2018) 0.52 - 1.04 mg/dL       _  Result Component Current Result Ref [place]   AST 25 (7/14/2018) " 0 - 45 U/L     _  Result Component Current Result Ref [place]   ALT 39 (7/14/2018) 0 - 50 U/L     _  Result Component Current Result Ref [place]   Bilirubin Total 0.5 (7/14/2018) 0.2 - 1.3 mg/dL       _  Result Component Current Result Ref [place]   WBC 7.6 (7/14/2018) 4.0 - 11.0 10e9/L     _  Result Component Current Result Ref [place]   Hemoglobin 8.3 (L) (7/14/2018) 11.7 - 15.7 g/dL     _  Result Component Current Result Ref [place]   Platelet Count 464 (H) (7/14/2018) 150 - 450 10e9/L     _  Result Component Current Result Ref [place]   Absolute Neutrophil 5.6 (7/14/2018) 1.6 - 8.3 10e9/L       Assessment:  Patient is appropriate to start therapy.    Plan:  Basic chemotherapy teaching was reviewed with the patient including indication, start date of therapy, dose, administration, adverse effects, missed doses, food and drug interactions, monitoring, side effect management, office contact information, and safe handling. Written materials were provided and all questions answered.       Fito May, PharmD.  Oral Chemotherapy Monitoring Program  [phone]

## 2018-07-22 ENCOUNTER — RADIANT APPOINTMENT (OUTPATIENT)
Dept: MRI IMAGING | Facility: CLINIC | Age: 33
End: 2018-07-22
Attending: INTERNAL MEDICINE
Payer: COMMERCIAL

## 2018-07-22 DIAGNOSIS — C74.01 ADRENAL CORTEX CANCER, RIGHT (H): ICD-10-CM

## 2018-07-22 RX ORDER — GADOBUTROL 604.72 MG/ML
7.5 INJECTION INTRAVENOUS ONCE
Status: COMPLETED | OUTPATIENT
Start: 2018-07-22 | End: 2018-07-22

## 2018-07-22 RX ADMIN — GADOBUTROL 7.5 ML: 604.72 INJECTION INTRAVENOUS at 11:11

## 2018-07-22 NOTE — DISCHARGE INSTRUCTIONS
MRI Contrast Discharge Instructions    The IV contrast you received today will pass out of your body in your  urine. This will happen in the next 24 hours. You will not feel this process.  Your urine will not change color.    Drink at least 4 extra glasses of water or juice today (unless your doctor  has restricted your fluids). This reduces the stress on your kidneys.  You may take your regular medicines.    If you are on dialysis: It is best to have dialysis today.    If you have a reaction: Most reactions happen right away. If you have  any new symptoms after leaving the hospital (such as hives or swelling),  call your hospital at the correct number below. Or call your family doctor.  If you have breathing distress or wheezing, call 911.    Special instructions: ***    I have read and understand the above information.    Signature:______________________________________ Date:___________    Staff:__________________________________________ Date:___________     Time:__________    [place] Radiology Departments:    ___[place]: [phone]  ___[facility]: [phone]  ___[place]: [phone] ___[facility]: [phone]  ___[facility]: [phone]  ___[facility]: [phone]  ___[phone]  ___[facility]: [phone]  ___[phone]

## 2018-07-23 ENCOUNTER — RADIANT APPOINTMENT (OUTPATIENT)
Dept: PET IMAGING | Facility: CLINIC | Age: 33
End: 2018-07-23
Attending: UROLOGY
Payer: COMMERCIAL

## 2018-07-23 DIAGNOSIS — C74.01 CARCINOMA, ADRENAL CORTICAL, RIGHT (H): ICD-10-CM

## 2018-07-23 LAB — GLUCOSE SERPL-MCNC: 112 MG/DL (ref 70–99)

## 2018-07-23 RX ORDER — FUROSEMIDE 10 MG/ML
40 INJECTION INTRAMUSCULAR; INTRAVENOUS ONCE
Status: COMPLETED | OUTPATIENT
Start: 2018-07-23 | End: 2018-07-23

## 2018-07-23 RX ADMIN — FUROSEMIDE 40 MG: 10 INJECTION INTRAMUSCULAR; INTRAVENOUS at 09:25

## 2018-07-23 NOTE — DISCHARGE INSTRUCTIONS
dWhat to expect when you have contrast    During your exam, we will inject  contrast  into your vein or artery. (Contrast is a clear liquid with iodine in it. It shows up on X-rays.)    You may feel warm or hot. You may have a metal taste in your mouth and a slight upset stomach. You may also feel pressure near the kidneys and bladder. These effects will last about 1 to 3 minutes.    Please tell us if you have:    Sneezing     Itching    Hives     Swelling in the face    A hoarse voice    Breathing problems    Other new symptoms    Serious problems are rare.  They may include:    Irregular heartbeat     Seizures    Kidney failure              Tissue damage    Shock      Death    If you have any problems during the exam, we  will treat them right away.    When you get home    Call your hospital if you have any new symptoms in the next 2 days, like hives or swelling. (Phone numbers are at the bottom of this page.) Or call your family doctor.     If you have wheezing or trouble breathing, call 911.    Self-care  -Drink at least 4 extra glasses of water today.   This reduces the stress on your kidneys.  -Keep taking your regular medicines.    The contrast will pass out of your body in your  Urine(pee). This will happen in the next 24 hours. You  will not feel this. Your urine will not  change color.    If you have kidney problems or take metformin    Drink 4 to 8 large glasses of water for the next  2 days, if you are not on a fluid restriction.    ?If you take metformin (Glucophage or Glucovance) for diabetes, keep taking it.      ?Your kidney function tests are abnormal.  If you take Metformin, do not take it for 48 hours. Please go to your clinic for a blood test within 3 days after your exam before the restarting this medicine.     (Note to provider:please give patient prescription for lab tests.)    ?Special instructions: ***    I have read and understand the above information.    Patient Sign  Here:______________________________________Date:________Time:______    Staff Sign Here:________________________________________Date:_______Time:______      Radiology Departments:     ?[facility]: [phone] ?[place]: [phone]     ?[place]: [phone] ?[place]:[phone]      ?[place]: [phone]  ?[place]: [phone]  ?[place]:[phone]    ?[facility]:[phone]  ?[facility]:[phone]

## 2018-07-23 NOTE — PROGRESS NOTES
Received a call from BILL Lam, who asked me to look at patient's PET scan due to concern for enlarging right pleural effusion.  I agree that this has increased from comparison CT 6/29/2018.  According to Camden, the patient does complaint of occasional chest pressure with deep inspiration.  She also gets mildly short of breath with increased physical activity.  She has an appointment with her radiation oncologist on Wednesday, 7/25.  I asked Camden to emphasize to the patient that if her shortness of breath or chest pain should worsen between now and Wednesday, she should present to the emergency department for further evaluation, including possible right thoracentesis.  Otherwise, she should keep her appointment with her radiation oncologist.    Molly Richmond MD

## 2018-07-24 ENCOUNTER — CARE COORDINATION (OUTPATIENT)
Dept: ONCOLOGY | Facility: CLINIC | Age: 33
End: 2018-07-24

## 2018-07-24 ENCOUNTER — TELEPHONE (OUTPATIENT)
Dept: ONCOLOGY | Facility: CLINIC | Age: 33
End: 2018-07-24

## 2018-07-24 NOTE — PROGRESS NOTES
TC to pt to review negative c-diff results. Pt stated she did get notification already that they were negative. Pt stated that her diarrhea isn't as bad, and that her stool are now just softer than normal. Pt also stated that her back pain is much better, but does have the same pattern as described during previous phone call with writer in that it comes and goes. Writer advised pt may treat diarrhea if it returns she may treat it with imodium. Pt requested to review her PET/CT results. Message sent to Dr. Mansfield and his care coordinator requesting she be called to review her results. Patient stated understanding of all and agreed to call clinic with future questions or concerns.

## 2018-07-24 NOTE — TELEPHONE ENCOUNTER
"Oral Chemotherapy Monitoring Program    Primary Oncologist: Dr. Cintron  Primary Oncology Clinic: [facility]  Cancer Diagnosis: Adrenal Cancer    Therapy History:  Mitotane 500mg  BID for 2 weeks, then TID for 2 weeks, then QID  Started 7/24/18    Drug Interaction Assessment: No new drug interactions identified at this time.    Lab Monitoring Plan  CBC, CMP, DHEAS monthly. Endocrine q6 months.  Subjective/Objective:  Suzy Rodríguez is a 32 year old female contacted by phone for a follow-up visit for oral chemotherapy.  Suzy received her mitotane today and is beginning therapy. We reviewed medications for nausea/vomiting and diarrhea. She thanked me for the call and care.    ORAL CHEMOTHERAPY 7/20/2018 7/24/2018   Drug Name Lysodren (Mitotane) Lysodren (Mitotane)   Current Dosage 500mg -   Current Schedule Other Other   Cycle Details Continuous Continuous   Start Date of Last Cycle - 7/24/2018   Any new drug interactions? - No       Vitals:  BP:   BP Readings from Last 1 Encounters:   07/16/18 126/89     Wt Readings from Last 1 Encounters:   07/16/18 72.1 kg (159 lb)     Estimated body surface area is 1.8 meters squared as calculated from the following:    Height as of 7/16/18: 1.626 m (5' 4\").    Weight as of 7/16/18: 72.1 kg (159 lb).    Labs:  _  Result Component Current Result Ref [place]   Sodium 136 (7/14/2018) 133 - 144 mmol/L     _  Result Component Current Result Ref [place]   Potassium 3.5 (7/14/2018) 3.4 - 5.3 mmol/L     _  Result Component Current Result Ref [place]   Calcium 8.7 (7/14/2018) 8.5 - 10.1 mg/dL     No results found for Mag within last 30 days.     No results found for Phos within last 30 days.     _  Result Component Current Result Ref [place]   Albumin 3.4 (7/14/2018) 3.4 - 5.0 g/dL     _  Result Component Current Result Ref [place]   Urea Nitrogen 6 (L) (7/14/2018) 7 - 30 mg/dL     _  Result Component Current Result Ref [place]   Creatinine 0.80 (7/14/2018) 0.52 - 1.04 mg/dL "       _  Result Component Current Result Ref [place]   AST 25 (7/14/2018) 0 - 45 U/L     _  Result Component Current Result Ref [place]   ALT 39 (7/14/2018) 0 - 50 U/L     _  Result Component Current Result Ref [place]   Bilirubin Total 0.5 (7/14/2018) 0.2 - 1.3 mg/dL       _  Result Component Current Result Ref [place]   WBC 7.6 (7/14/2018) 4.0 - 11.0 10e9/L     _  Result Component Current Result Ref [place]   Hemoglobin 8.3 (L) (7/14/2018) 11.7 - 15.7 g/dL     _  Result Component Current Result Ref [place]   Platelet Count 464 (H) (7/14/2018) 150 - 450 10e9/L     _  Result Component Current Result Ref [place]   Absolute Neutrophil 5.6 (7/14/2018) 1.6 - 8.3 10e9/L       Plan:  Begin mitotane therapy.    Follow-Up:  In approximately 1 weeks to assess side effects.    Refill Due:  8/8 for 8/15 Lumicera.    Steve Mclaughlin  Pharmacy Intern  Oral Chemotherapy Monitoring Program  [facility]  [phone]

## 2018-07-25 ENCOUNTER — OFFICE VISIT (OUTPATIENT)
Dept: ENDOCRINOLOGY | Facility: CLINIC | Age: 33
End: 2018-07-25
Payer: COMMERCIAL

## 2018-07-25 ENCOUNTER — OFFICE VISIT (OUTPATIENT)
Dept: RADIATION ONCOLOGY | Facility: CLINIC | Age: 33
End: 2018-07-25
Attending: RADIOLOGY
Payer: COMMERCIAL

## 2018-07-25 ENCOUNTER — MYC MEDICAL ADVICE (OUTPATIENT)
Dept: ONCOLOGY | Facility: CLINIC | Age: 33
End: 2018-07-25

## 2018-07-25 ENCOUNTER — RECORDS - HEALTHEAST (OUTPATIENT)
Dept: ADMINISTRATIVE | Facility: OTHER | Age: 33
End: 2018-07-25

## 2018-07-25 VITALS
SYSTOLIC BLOOD PRESSURE: 126 MMHG | HEART RATE: 114 BPM | DIASTOLIC BLOOD PRESSURE: 86 MMHG | WEIGHT: 158.73 LBS | BODY MASS INDEX: 27.25 KG/M2

## 2018-07-25 DIAGNOSIS — C74.01 ADRENAL CORTEX CANCER, RIGHT (H): Primary | ICD-10-CM

## 2018-07-25 DIAGNOSIS — E27.49 SECONDARY ADRENAL INSUFFICIENCY (H): ICD-10-CM

## 2018-07-25 PROCEDURE — G0463 HOSPITAL OUTPT CLINIC VISIT: HCPCS | Performed by: RADIOLOGY

## 2018-07-25 RX ORDER — HYDROCORTISONE 10 MG/1
TABLET ORAL
Qty: 150 TABLET | Refills: 11 | Status: SHIPPED | OUTPATIENT
Start: 2018-07-25 | End: 2018-11-02

## 2018-07-25 ASSESSMENT — ENCOUNTER SYMPTOMS
HEARTBURN: 0
NECK PAIN: 0
NAUSEA: 1
FALLS: 0
DIARRHEA: 1
VOMITING: 0
DEPRESSION: 1
CONSTIPATION: 0
BACK PAIN: 1
FEVER: 1
SHORTNESS OF BREATH: 1
ABDOMINAL PAIN: 0
CHILLS: 1
COUGH: 0
NERVOUS/ANXIOUS: 1
BLOOD IN STOOL: 0
DIZZINESS: 1
HEADACHES: 0
WHEEZING: 0
BLURRED VISION: 0
DYSURIA: 0
SORE THROAT: 0
WEIGHT LOSS: 0
FREQUENCY: 0

## 2018-07-25 NOTE — MR AVS SNAPSHOT
After Visit Summary   7/25/2018    Suzy Rodríguez    MRN: [id number]           Patient Information     Date Of Birth          1985        Visit Information        Provider Department      7/25/2018 11:20 AM Roel Beverly MD [facility] Endocr[facility]ology        Today's Diagnoses     Adrenal cortex cancer, right (H)    -  1    Secondary adrenal insufficiency (H)           Follow-ups after your visit        Follow-up notes from your care team     Return in about 6 weeks (around 9/5/2018).      Your next 10 appointments already scheduled     Jul 31, 2018  8:00 AM CDT   (Arrive by 7:45 AM)   NEW WITH ROOM with Ector Marcelino PsyD,  2 114 CONSULT RM   [facility] ([facility])    [address]  [address]  [place] [id number] [phone]            Aug 15, 2018  9:30 AM CDT   [facility] with [facility]   [facility] ([facility])    [address]  [address]  [place] [id number] [phone]            Aug 15, 2018 10:15 AM CDT   (Arrive by 10:00 AM)   Return Visit with Benson Cintron MD   [facility] ([facility])    [address]  [address]  [place] [id number] [phone]            Aug 23, 2018  7:20 AM CDT   (Arrive by 7:05 AM)   Return Visit with Warren Mansfield MD   [facility] ([facility])    [address] [place] [address]  4th Floor  [place] [id number] [phone]            Sep 19, 2018  1:30 PM CDT   (Arrive by 1:15 PM)   RETURN ENDOCRINE with Roel Beverly MD   [facility] ([facility])    [address] [place] [address]  3rd Floor  [facility] [id number] [phone]              Who to contact     Please call your clinic at [phone] to:    Ask questions about your health    Make or  cancel appointments    Discuss your medicines    Learn about your test results    Speak to your doctor            Additional Information About Your Visit        [employer] Information     [employer] gives you secure access to your electronic health record. If you see a primary care provider, you can also send messages to your care team and make appointments. If you have questions, please call your primary care clinic.  If you do not have a primary care provider, please call [phone] and they will assist you.      [employer] is an electronic gateway that provides easy, online access to your medical records. With [employer], you can request a clinic appointment, read your test results, renew a prescription or communicate with your care team.     To access your existing account, please contact your [facility] or call [phone] for assistance.        Care EveryWhere ID     This is your Care EveryWhere ID. This could be used by other organizations to access your [facility] medical records  [id number]         Blood Pressure from Last 3 Encounters:   07/25/18 126/86   07/16/18 126/89   07/14/18 113/71    Weight from Last 3 Encounters:   07/25/18 72 kg (158 lb 11.7 oz)   07/16/18 72.1 kg (159 lb)   07/14/18 70.3 kg (155 lb)              Today, you had the following     No orders found for display         Today's Medication Changes          These changes are accurate as of 7/25/18  3:17 PM.  If you have any questions, ask your nurse or doctor.               Start taking these medicines.        Dose/Directions    hydrocortisone sodium succinate  MG injection   Commonly known as:  solu-CORTEF   Used for:  Secondary adrenal insufficiency (H)   Started by:  Roel Beverly MD        Dose:  100 mg   Inject 2 mLs (100 mg) into the muscle once for 1 dose Use in emergency situations as discussed in clinic.   Quantity:  2 mL   Refills:  1         These medicines have changed or have updated  prescriptions.        Dose/Directions    hydrocortisone 10 MG tablet   Commonly known as:  CORTEF   This may have changed:    - how much to take  - how to take this  - when to take this  - additional instructions  - Another medication with the same name was removed. Continue taking this medication, and follow the directions you see here.   Changed by:  Roel Beverly MD        Take 3 pills (=30 mg) in AM and 2 pills (=20 mg) at 2 pm daily.   Quantity:  150 tablet   Refills:  11            Where to get your medicines      These medications were sent to [facility], [place] - [address]  [address], [facility] [address]     Phone:  [phone]     hydrocortisone 10 MG tablet    hydrocortisone sodium succinate  MG injection                Primary Care Provider Office Phone # Fax #    [facility] [phone]       [facility] [address]        Equal Access to Services     [facility] : Hadii greg villanuevao Sobree, waaxda luqadaha, qaybta kaalmada adejamieyajohnnie, coleman putnam . So [facility] [phone].    ATENCIÓN: Si habla español, tiene a lyman disposición servicios gratuitos de asistencia lingüística. Rena al [phone].    We comply with applicable federal civil rights laws and [place] laws. We do not discriminate on the basis of race, color, national origin, age, disability, sex, sexual orientation, or gender identity.            Thank you!     Thank you for choosing [facility] ENDOCRINOLOGY  for your care. Our goal is always to provide you with excellent care. Hearing back from our patients is one way we can continue to improve our services. Please take a few minutes to complete the written survey that you may receive in the mail after your visit with us. Thank you!             Your Updated Medication List - Protect others around you: Learn how to safely use, store and throw away your medicines  at www.disposemymeds.org.          This list is accurate as of 7/25/18  3:17 PM.  Always use your most recent med list.                   Brand Name Dispense Instructions for use Diagnosis    acetaminophen 325 MG tablet    TYLENOL    150 tablet    Take 2 tablets (650 mg) by mouth every 4 hours as needed for other (multimodal surgical pain management along with NSAIDS and opioid medication as indicated based on pain control and physical function.)    Adrenal mass, right (H), Acute post-operative pain       buPROPion 100 MG tablet    WELLBUTRIN     TAKE 1 TABLET (100 MG TOTAL) BY MOUTH 2 (TWO) TIMES A DAY.        calcium carbonate antacid 1000 MG Chew      Take 1 tablet by mouth every morning        diazepam 2 MG tablet    VALIUM    5 tablet    Take    Anxiety       enoxaparin 40 MG/0.4ML injection    LOVENOX    14 Syringe    Inject 0.4 mLs (40 mg) Subcutaneous daily    Adrenal mass, right (H), H/O MTHFR mutation       EPINEPHrine 0.3 MG/0.3ML injection 2-pack    EPIPEN/ADRENACLICK/or ANY BX GENERIC EQUIV     As directed for bee stings        FLUoxetine 20 MG capsule    PROzac     Take 20 mg by mouth every morning        hydrocortisone 10 MG tablet    CORTEF    150 tablet    Take 3 pills (=30 mg) in AM and 2 pills (=20 mg) at 2 pm daily.        hydrocortisone sodium succinate  MG injection    solu-CORTEF    2 mL    Inject 2 mLs (100 mg) into the muscle once for 1 dose Use in emergency situations as discussed in clinic.    Secondary adrenal insufficiency (H)       Lidocaine 4 % Patch    LIDOCARE    14 patch    Place 2 patches onto the skin every 24 hours    Adrenal mass, right (H), Acute post-operative pain       LORazepam 0.5 MG tablet    ATIVAN    30 tablet    Take 1 tablet (0.5 mg) by mouth every 8 hours as needed for anxiety    Adrenal mass (H), Anxiety       * mitotane 500 MG tablet CHEMO    LYSODREN    120 tablet    Start at 500mg twice daily, then increase to 500mg TID then 500mg QID. Ultimate goal is to  take 1gm QID.    Adrenal cortex cancer, right (H)       * mitotane 500 MG tablet CHEMO    LYSODREN    70 tablet    Take 500mg twice daily for 2 weeks, then increase to 500mg TID for 2 weeks.    Adrenal cortex cancer, right (H)       ondansetron 8 MG tablet    ZOFRAN    30 tablet    Take 1 tablet (8 mg) by mouth every 8 hours as needed for nausea    Adrenal cortex cancer, right (H)       ONE-A-DAY WOMENS PO      Take 2 tablets by mouth every morning        oxyCODONE IR 10 MG tablet    ROXICODONE    22 tablet    Take 0.5-1 tablets (5-10 mg) by mouth every 4 hours as needed for moderate to severe pain    Adrenal mass, right (H), Acute post-operative pain       oxyCODONE-acetaminophen 5-325 MG per tablet    PERCOCET    12 tablet    Take 1-2 tablets by mouth every 4 hours as needed for pain        VITAMIN D-1000 MAX ST 1000 units Tabs   Generic drug:  cholecalciferol      Take 2,000 Units by mouth every morning        * Notice:  This list has 2 medication(s) that are the same as other medications prescribed for you. Read the directions carefully, and ask your doctor or other care provider to review them with you.

## 2018-07-25 NOTE — PROGRESS NOTES
HPI    INITIAL PATIENT ASSESSMENT    Diagnosis: R adrenal cortical carcinoma    Prior radiation therapy: None    Prior chemotherapy: None    Prior hormonal therapy:No    Pain Eval:  Denies    Psychosocial  Living arrangements:  and 2 kids  Fall Risk: independent   referral needs: Not needed    Advanced Directive: No  Implantable Cardiac Device? No    Onset of menarche: 11  LMP: No LMP recorded.  Onset of menopause: 6/20/18  Abnormal vaginal bleeding/discharge: No  Are you pregnant? No  Reproductive note: 2 children    Nurse face-to-face time: Level 4:  15 min face to face time  Review of Systems   Constitutional: Positive for chills, fever and malaise/fatigue. Negative for weight loss.        Low grade fevers over weekend and hot flashes since surgery   HENT: Negative for congestion, hearing loss, sore throat and tinnitus.    Eyes: Negative for blurred vision.   Respiratory: Positive for shortness of breath. Negative for cough and wheezing.    Cardiovascular: Negative for chest pain and leg swelling.   Gastrointestinal: Positive for diarrhea and nausea. Negative for abdominal pain, blood in stool, constipation, heartburn and vomiting.   Genitourinary: Negative for dysuria, frequency and urgency.   Musculoskeletal: Positive for back pain. Negative for falls and neck pain.   Skin: Positive for itching. Negative for rash.        Neck and chest itches since contrast   Neurological: Positive for dizziness. Negative for headaches.   Endo/Heme/Allergies: Negative for environmental allergies.   Psychiatric/Behavioral: Positive for depression. The patient is nervous/anxious.

## 2018-07-25 NOTE — LETTER
7/25/2018       RE: Suzy Rodríguez  [address]  [place] [place] [address]     Dear Colleague,    Thank you for referring your patient, Suzy Rodríguez, to the [facility] ENDOCRINOLOGY at [facility]. Please see a copy of my visit note below.    Endocrinology Clinic Visit 07/25/18  NAME: Suzy Rodríguez  PCP:  Adrian [facility]  MRN:  [id number]    Chief Complaint     Chief Complaint   Patient presents with     RECHECK       History of Present Illness     Suzy Rodríguez is a 32 year old female with past medical history of PCOS and MTHFR clotting disorder who is seen in clinic for follow up after recent diagnosis of adrenal cortical carcinoma.     Briefly she presented to me with a 9.2x8 cm right adrenal mass that was discovered incidentally on CT scan of the abdomen and pelvis done on 5/8/2018 when she presented with flank pain due to nephrolithiasis.  MRI May 15:  9.2x8.1x7.6 cm right indeterminate.     As far as symptoms, she mostly reports feeling more depressed in the past few months. Higher BP in past few months. Weight gain (s/p bariatric surgery, pre-surgery weight 235 lb, inga post-surgery 135 lbs, gained to 157 lbs in 8 months). Some easy bruising. Rounding of the face. Increased facial hair. Irregular menses.    Biochemical workup showed Cushing's disease, adrenal source.   - 24 hr urine free cortisol: 351   - AM cortisol after dex suppression: 23  - ACTH: <10  - Aldosterone and catecholamines/metanephrines normal.   - DHEAS: 740 (elevated).     Patient underwent right adrenalectomy by Dr. Mansfield on 6/256/18. Pathology: adrenal cortical carcinoma, invasion into capsule, high mitotic index and high Ki67.   Post-op was discharged on Hydrocortisone 20+10 mg.   Her post-op DHEAS was <15.     She saw Oncology and plan is to start radiation therapy and mitotane. She started mitotane yesterday. Plan for 5 years.     Today she reports feeling fatigued. Some nausea,  few episodes of vomiting. Diarrhea since hospital discharge. Back pain, bilateral since 1 week. Was evaluated for kidney stones, did not see any. No loss of consciousness. Some dizziness and lightheadedness.     Problem List     Patient Active Problem List   Diagnosis     Adrenal mass, right (H)     Acute post-operative pain     Chest pain     Postoperative anemia due to acute blood loss     Pleural effusion on right     Ascites     Adrenal cortex cancer, right (H)     Anemia     Adrenal mass (H)        Medications     Current Outpatient Prescriptions   Medication     acetaminophen (TYLENOL) 325 MG tablet     buPROPion (WELLBUTRIN) 100 MG tablet     calcium carbonate antacid 1000 MG CHEW     cholecalciferol (VITAMIN D-1000 MAX ST) 1000 units TABS     diazepam (VALIUM) 2 MG tablet     enoxaparin (LOVENOX) 40 MG/0.4ML injection     EPINEPHrine (EPIPEN/ADRENACLICK/OR ANY BX GENERIC EQUIV) 0.3 MG/0.3ML injection 2-pack     FLUoxetine (PROZAC) 20 MG capsule     hydrocortisone (CORTEF) 10 MG tablet     Lidocaine (LIDOCARE) 4 % Patch     LORazepam (ATIVAN) 0.5 MG tablet     mitotane (LYSODREN) 500 MG tablet CHEMO     Multiple Vitamins-Calcium (ONE-A-DAY WOMENS PO)     ondansetron (ZOFRAN) 8 MG tablet     mitotane (LYSODREN) 500 MG tablet CHEMO     oxyCODONE IR (ROXICODONE) 10 MG tablet     oxyCODONE-acetaminophen (PERCOCET) 5-325 MG per tablet     [DISCONTINUED] hydrocortisone (CORTEF) 10 MG tablet     [DISCONTINUED] hydrocortisone (CORTEF) 20 MG tablet     No current facility-administered medications for this visit.         Allergies     Allergies   Allergen Reactions     Bee Venom Swelling     Ibuprofen Other (See Comments), Hives and Swelling       Medical / Surgical History     Past Medical History:   Diagnosis Date     Adrenal cortex cancer, right (H) 6/30/2018    Resected 6/25/18, Dr. Mansfield.     Adrenal mass (H)      Chocolate cyst of ovary 2011     History of miscarriage      MTHFR mutation (H)      Past Surgical  History:   Procedure Laterality Date     ADRENALECTOMY Right 2018    Procedure: ADRENALECTOMY;  Right Adrenalectomy,  Embolize Liver , Anesthesia Block;  Surgeon: Warren Mansfield MD;  Location: [facility] OR      SECTION      twice     EMBOLECTOMY ABDOMEN N/A 2018    Procedure: EMBOLECTOMY ABDOMEN;;  Surgeon: Ector Mcintyre MD;  Location: [facility] OR     GASTRIC BYPASS         Social History     Social History     Social History     Marital status:      Spouse name: N/A     Number of children: N/A     Years of education: N/A     Occupational History     Not on file.     Social History Main Topics     Smoking status: Never Smoker     Smokeless tobacco: Never Used     Alcohol use Yes      Comment: occ.     Drug use: No     Sexual activity: No     Other Topics Concern     Not on file     Social History Narrative       Family History     Negative for adrenal issues    ROS     Constitutional: no fevers, chills, night sweats. + fatigue. Good appetite  Eyes: no vision changes, no eye redness, no diplopia  Ears, Nose, mouth, throat: no hearing changes, no tinnitus, no rhinorrhea, no nasal congestion  Cardiovascular: no chest pain, no orthopnea or PND, no edema, no palpitations  Respiratory: no dyspnea, no cough, no sputum, no wheezing  Gastrointestinal: no nausea, no vomiting, no abdominal pain, no diarrhea, no constipation  Genitourinary: no dysuria, no frequency, no urgency, no nocturia  Musculoskeletal: no joint pains, no back pain, no cramps, no fractures  Skin: no rash, no itching, no dryness, no ulcers, no hair loss, no nail changes  Neurological:no headaches, no weakness, no numbness, no tingling, no tremors  Psychiatric: + anxiety, + sadness  Hematologic/lymphatic: + easy bruising, no bleeding, no palor    Physical Exam   General: Comfortable, no obvious distress, normal body habitus  Eyes: Sclera anicteric, moist conjunctiva  HENT: Atraumatic, oropharynx clear, moist mucous membranes  with no mucosal ulcerations, normal enamel on teeth  Neck: Trachea midline, supple. Thyroid: Thyroid is normal in size and texture  Back: no supraclavicular fat pad  CV: Regular rhythm, normal rate. No murmurs auscultated  Resp: Clear to auscultation bilaterally, good effort  Abdomen:  Soft, non tender, non distended. Bowel sounds heard. No organomegaly. No striae  Skin: No rashes, lesions, or subcutaneous nodules.   Psych: Alert and oriented x 3. Appropriate affect, good insight  Extremities: No peripheral edema  Musculoskeletal: Appropriate muscle bulk and strength  Lymphatic: No cervical lymphadenopathy  Neuro: Moves all four extremities. No focal deficits on limited exam. No proximal muscle weakness. Gait normal.     Labs/Imaging     Pertinent Labs were reviewed and updated in [facility].  Radiology Results were  reviewed and updated in [facility].    I personally reviewed the patient's outside records from Care Everywhere. Summary of pertinent findings in [facility].      Impression / Plan     1. Adrenal Cortical Carcinoma:  Diagnosed June 2018. Incidental Adrenal Mass 10 cm in size, with Cushing's Disease.   S/p surgery 6/25/18.     High recurrent rate due to size, invasion of capsule, high mitotic rate.   Pre-op DHEAS 740 , postop <15    Now on Mitotane (started 2 days ago).   Plan for XRT.   Follow up with Oncology    2. Cushing's disease, Adrenal source.  Resolved post-op.     3. Secondary adrenal insufficiency   Developed after adrenalectomy.   Now on mitotane, so she is expected to require steroid replacement as long as she is on mitotane.     Today the bulk of the visit was spent going over the issue of adrenal insufficiency. We discussed:   - steroid replacement: right now she is fatigued, has symptoms of adrenal insufficiency. We will increase her steroid dosage for the next few weeks as she is starting mitotane and is under medical stress. Nw dosage will be 30 mg in AM, 20 mg in PM. We will re-assess in few weeks at  next visit.   - need to double the dose when ill  - need for IM rescue shot when critical, on way to ED      Follow up: 6 weeks    Again, thank you for allowing me to participate in the care of your patient.      Sincerely,    Roel Beverly MD

## 2018-07-25 NOTE — LETTER
7/25/2018       RE: Suzy Rodríguez  [address]  [facility] [address]     Dear Colleague,    Thank you for referring your patient, Suzy Rodríguez, to the RADIATION ONCOLOGY CLINIC. Please see a copy of my visit note below.      HPI    INITIAL PATIENT ASSESSMENT    Diagnosis: R adrenal cortical carcinoma    Prior radiation therapy: None    Prior chemotherapy: None    Prior hormonal therapy:No    Pain Eval:  Denies    Psychosocial  Living arrangements:  and 2 kids  Fall Risk: independent   referral needs: Not needed    Advanced Directive: No  Implantable Cardiac Device? No    Onset of menarche: 11  LMP: No LMP recorded.  Onset of menopause: 6/20/18  Abnormal vaginal bleeding/discharge: No  Are you pregnant? No  Reproductive note: 2 children    Nurse face-to-face time: Level 4:  15 min face to face time  Review of Systems   Constitutional: Positive for chills, fever and malaise/fatigue. Negative for weight loss.        Low grade fevers over weekend and hot flashes since surgery   HENT: Negative for congestion, hearing loss, sore throat and tinnitus.    Eyes: Negative for blurred vision.   Respiratory: Positive for shortness of breath. Negative for cough and wheezing.    Cardiovascular: Negative for chest pain and leg swelling.   Gastrointestinal: Positive for diarrhea and nausea. Negative for abdominal pain, blood in stool, constipation, heartburn and vomiting.   Genitourinary: Negative for dysuria, frequency and urgency.   Musculoskeletal: Positive for back pain. Negative for falls and neck pain.   Skin: Positive for itching. Negative for rash.        Neck and chest itches since contrast   Neurological: Positive for dizziness. Negative for headaches.   Endo/Heme/Allergies: Negative for environmental allergies.   Psychiatric/Behavioral: Positive for depression. The patient is nervous/anxious.                    RADIATION ONCOLOGY CONSULTATION  [facility]    DATE:  July  25, 2018    PATIENT NAME: Suzy Rodríguez    MEDICAL RECORD NUMBER: [id number]    REFERRING PHYSICIAN:  Dr. Mansfield    REASON FOR CONSULTATION: Consideration of adjuvant radiotherapy for T2N0M0 adrenocortical carcinoma, s/p RT adrenalectomy, positive surgical margin.     HISTORY OF PRESENT ILLNESS:    Mr. Rodríguez is a 33 yo woman with recent diagnosis of adrenocortical carcinoma. She inially presented to emergency room on 5/8/2018 with acute onset left flank pain. Given her Hx of renal stones, she was evaluated by CT abdomen and pelvis on 5/8/18 showed 9.2 x 8 cm heterogeneous right upper quadrant mass with small foci of calcification, which is likely arising from the adrenal gland though inseparable from liver and upper pole of right kidney.There was a 6 x 4 x 4 mm upper third left ureteral obstructing stone with mild to moderate secondary hydronephrosis, collection of stones at lower pole left kidney extending over an area of approximately 6 x 7 x  4 mm, a non-obstructing 1 mm stone upper pole left kidney and a 2 mm nonobstructing stone noted upper pole right kidney with no hydronephrosis on the right.     She underwent left nephrolithiasis on 5/11/18 by Dr. Delvalle at [facility] urology clinic.Then she was referred to Dr. Patel for evaluation of her adrenal mass. On further questioning, She had noticed, over the past 8-9 months, large amount of facial hair, irregular periods and some facial swelling.She denies any palpitations, headaches, or sweats.    Then she was referred Dr. Mansfield for possible surgical resection. The plan was to proceed with surgery given that the tumor mass was too larg to be observed.    She underwent right adrenalectomy on 6/25/18. The surgical pathology ([id number]) was low grade adrenal cortical carcinoma of a mass sized 11.3 cm. The tumor shows necrosis and invasion into the adrenal capsule. The margins were involved by the tumor.The tumor was less than 25% clear cells and a mitotic  rate of 15 per 50 HPF. The Ki-67 mitotic rate was 20%. [id number]    For staging workup, she underwent MRI of the brain on 18 showed no evidence of brain metastases. Also, she had PET scan on 18 showed no suspicious metastatic lesions.    Today, she presents to the clinic for consideration of radiotherapy. She is recovering well from surgery. She reports having fatigue related to exertion, SOB likely related to her pleural effusion, however, she does not need any medications or oxygen. Also, she reports mild RUQ pain, increased by deep inspiration. She takes tylenol PRN.     PMH:   Past Medical History:   Diagnosis Date     Adrenal cortex cancer, right (H) 2018    Resected 18, Dr. Mansfield.     Adrenal mass (H)      Chocolate cyst of ovary      History of miscarriage      MTHFR mutation (H)        PSH:  Past Surgical History:   Procedure Laterality Date     ADRENALECTOMY Right 2018    Procedure: ADRENALECTOMY;  Right Adrenalectomy,  Embolize Liver , Anesthesia Block;  Surgeon: Warren Mansfield MD;  Location: [facility] OR      SECTION      twice     EMBOLECTOMY ABDOMEN N/A 2018    Procedure: EMBOLECTOMY ABDOMEN;;  Surgeon: Ector Mcintyre MD;  Location: [facility] OR     GASTRIC BYPASS         MEDICATIONS:  Current Outpatient Prescriptions   Medication Sig Dispense Refill     acetaminophen (TYLENOL) 325 MG tablet Take 2 tablets (650 mg) by mouth every 4 hours as needed for other (multimodal surgical pain management along with NSAIDS and opioid medication as indicated based on pain control and physical function.) 150 tablet 0     buPROPion (WELLBUTRIN) 100 MG tablet TAKE 1 TABLET (100 MG TOTAL) BY MOUTH 2 (TWO) TIMES A DAY.  3     calcium carbonate antacid 1000 MG CHEW Take 1 tablet by mouth every morning        cholecalciferol (VITAMIN D-1000 MAX ST) 1000 units TABS Take 2,000 Units by mouth every morning        diazepam (VALIUM) 2 MG tablet Take 5 tablet 0     enoxaparin  (LOVENOX) 40 MG/0.4ML injection Inject 0.4 mLs (40 mg) Subcutaneous daily 14 Syringe 0     EPINEPHrine (EPIPEN/ADRENACLICK/OR ANY BX GENERIC EQUIV) 0.3 MG/0.3ML injection 2-pack As directed for bee stings       FLUoxetine (PROZAC) 20 MG capsule Take 20 mg by mouth every morning        hydrocortisone (CORTEF) 10 MG tablet Take 3 pills (=30 mg) in AM and 2 pills (=20 mg) at 2 pm daily. 150 tablet 11     Lidocaine (LIDOCARE) 4 % Patch Place 2 patches onto the skin every 24 hours 14 patch 0     LORazepam (ATIVAN) 0.5 MG tablet Take 1 tablet (0.5 mg) by mouth every 8 hours as needed for anxiety 30 tablet 0     mitotane (LYSODREN) 500 MG tablet CHEMO Take 500mg twice daily for 2 weeks, then increase to 500mg TID for 2 weeks. (Patient not taking: Reported on 7/25/2018) 70 tablet 0     mitotane (LYSODREN) 500 MG tablet CHEMO Start at 500mg twice daily, then increase to 500mg TID then 500mg QID. Ultimate goal is to take 1gm QID. 120 tablet 0     Multiple Vitamins-Calcium (ONE-A-DAY WOMENS PO) Take 2 tablets by mouth every morning        ondansetron (ZOFRAN) 8 MG tablet Take 1 tablet (8 mg) by mouth every 8 hours as needed for nausea 30 tablet 0     oxyCODONE IR (ROXICODONE) 10 MG tablet Take 0.5-1 tablets (5-10 mg) by mouth every 4 hours as needed for moderate to severe pain (Patient not taking: Reported on 7/25/2018) 22 tablet 0     oxyCODONE-acetaminophen (PERCOCET) 5-325 MG per tablet Take 1-2 tablets by mouth every 4 hours as needed for pain (Patient not taking: Reported on 7/25/2018) 12 tablet 0     [DISCONTINUED] hydrocortisone (CORTEF) 10 MG tablet Take 1 tablet (10 mg) by mouth daily At 2pm until follow up with Endocrinology (total: 30mg daily) 30 tablet 1     [DISCONTINUED] hydrocortisone (CORTEF) 20 MG tablet Take 1 tablet (20 mg) by mouth every morning (then 10mg at 2pm) until followup with Endocrinology 30 tablet 1     ALLERGY:  Allergies   Allergen Reactions     Bee Venom Swelling     Ibuprofen Other (See  Comments), Hives and Swelling     FAMILY HISTORY:  History reviewed. No pertinent family history.    SOCIAL HISTORY  Social History   Substance Use Topics     Smoking status: Never Smoker     Smokeless tobacco: Never Used     Alcohol use Yes      Comment: occ.       ROS: 10 point ROS reviewed as reported on the nursing assessment note.    PHYSICAL EXAMINATION:  VS: Vitals: /86  Pulse 114  Wt 72 kg (158 lb 11.7 oz)  BMI 27.25 kg/m2  BMI: Body mass index is 27.25 kg/(m^2).  GENERAL: Appears well, in no acute distress.   CHEST: Cear bilateral breath sounds without wheezes or crackles.  ABDOMEN:  Clean surgical scar, no redness or edema. Bilateral abdominal striae. Soft and non tender without mass.    RADIOLOGY:  CT ABDOMEN AND PELVIS: on 5/8/18  IMPRESSION:  1.  6 x 4 x 4 mm left upper third ureteral obstructing stone with mild to moderate secondary hydronephrosis. Additional bilateral nonobstructive nephrolithiasis.  2.  Indeterminate mass right upper quadrant likely arising from the adrenal gland though inseparable from liver and kidney. Comparison with prior recommended. Additional indeterminate mass within the liver.  3.  Diverticulosis without diverticulitis.  4.  Small fat-containing paraumbilical hernia.  5.  Abnormal soft tissue density in subcutaneous fat adjacent to left rectus abdominis muscle. Correlate with prior.    PET SCAN: on 7/23/18  IMPRESSION:  1. Residual fluid collection at the vicinity of the adrenal gland with mild peripheral FDG uptake. This may represent postsurgical fluid collection.   2. Increased right pleural effusion since 6/29/2018. There is a plaque-like pleural thickening with mild elevated FDG uptake. This finding is indeterminant, while its favored to represent an infectious/inflammatory condition, metastasis cannot be clearly excluded. Attention on follow up.  3. Non-FDG avid 2.5 left complex adnexal cyst.    IMPRESSION:T2N0M0 adrenocortical carcinoma, s/p RT radical  adrenalectomy, positive surgical margin.     RECOMMENDATION: We reviewed the clinical history, imaging, the pathology and the staging workup of the patient. We had an extensive discussion with the patient about the adjuvant treatment options for her disease. We explained that the curative treatment for the adrenal tumors is surgery. However, the reported recurrence rate is 30-40%. The role of adjuvant radiation in such rare tumors in still controversial and the available data is debatable. However, given her young age, large tumor size, capsular invasion and positive surgical margin status, we recommend adjuvant radiotherapy for more local control of her disease with no impact on the survival.    Additionally, we explained the indication, logistics and the acute and late related side effects associated with the radiotherapy. We explained that there are some organs surrounding the adrenal gland might be at great risk from radiation; the right kidney which probably got damaged up to 50%, the liver, the intestine, the bowel and the ovaries. We told the patient that she might develop abdominal pain, nausea, vomiting, or diarrhea secondary to bowel toxicity. We also told her that her ovaries might be affected to the degree that she might need for an estrogen replacement therapy in the future.    The patient was favoring radiotherapy and was wishing if she could start ASAP. We explained that there is no need to hurry as she still recovers from surgery and she can receive the radiotherapy approximately 8 weeks after surgery. Also, she has seen Dr. Cintron who decided to start her on adjuvant mitotane and she started yesterday. She will go to [facility] next Monday for a second opinion. She prefers to have treatments at [facility] as she lives at [place].         The patient and her  had many questions during our conversation today, which were answered to their satisfaction, and verbalized understanding.        Ms. Rodríguez was seen and discussed with the staff, Dr. Garcia.         Pee Mills MD  PGY-2 Resident, Radiation Oncology  [facility]    I saw the patient with the resident.  I agree with the resident's note and plan of care.      SANTOS Garcia M.D.  Department of Radiation Oncology  [facility]

## 2018-07-25 NOTE — PROGRESS NOTES
Endocrinology Clinic Visit 07/25/18  NAME: Suzy Rodríguez  PCP:  Adrian, [facility]  MRN:  [id number]    Chief Complaint     Chief Complaint   Patient presents with     RECHECK       History of Present Illness     Suzy Rodríguez is a 32 year old female with past medical history of PCOS and MTHFR clotting disorder who is seen in clinic for follow up after recent diagnosis of adrenal cortical carcinoma.     Briefly she presented to me with a 9.2x8 cm right adrenal mass that was discovered incidentally on CT scan of the abdomen and pelvis done on 5/8/2018 when she presented with flank pain due to nephrolithiasis.  MRI May 15:  9.2x8.1x7.6 cm right indeterminate.     As far as symptoms, she mostly reports feeling more depressed in the past few months. Higher BP in past few months. Weight gain (s/p bariatric surgery, pre-surgery weight 235 lb, inga post-surgery 135 lbs, gained to 157 lbs in 8 months). Some easy bruising. Rounding of the face. Increased facial hair. Irregular menses.    Biochemical workup showed Cushing's disease, adrenal source.   - 24 hr urine free cortisol: 351   - AM cortisol after dex suppression: 23  - ACTH: <10  - Aldosterone and catecholamines/metanephrines normal.   - DHEAS: 740 (elevated).     Patient underwent right adrenalectomy by Dr. Mansfield on 6/256/18. Pathology: adrenal cortical carcinoma, invasion into capsule, high mitotic index and high Ki67.   Post-op was discharged on Hydrocortisone 20+10 mg.   Her post-op DHEAS was <15.     She saw Oncology and plan is to start radiation therapy and mitotane. She started mitotane yesterday. Plan for 5 years.     Today she reports feeling fatigued. Some nausea, few episodes of vomiting. Diarrhea since hospital discharge. Back pain, bilateral since 1 week. Was evaluated for kidney stones, did not see any. No loss of consciousness. Some dizziness and lightheadedness.     Problem List     Patient Active Problem List   Diagnosis      Adrenal mass, right (H)     Acute post-operative pain     Chest pain     Postoperative anemia due to acute blood loss     Pleural effusion on right     Ascites     Adrenal cortex cancer, right (H)     Anemia     Adrenal mass (H)        Medications     Current Outpatient Prescriptions   Medication     acetaminophen (TYLENOL) 325 MG tablet     buPROPion (WELLBUTRIN) 100 MG tablet     calcium carbonate antacid 1000 MG CHEW     cholecalciferol (VITAMIN D-1000 MAX ST) 1000 units TABS     diazepam (VALIUM) 2 MG tablet     enoxaparin (LOVENOX) 40 MG/0.4ML injection     EPINEPHrine (EPIPEN/ADRENACLICK/OR ANY BX GENERIC EQUIV) 0.3 MG/0.3ML injection 2-pack     FLUoxetine (PROZAC) 20 MG capsule     hydrocortisone (CORTEF) 10 MG tablet     Lidocaine (LIDOCARE) 4 % Patch     LORazepam (ATIVAN) 0.5 MG tablet     mitotane (LYSODREN) 500 MG tablet CHEMO     Multiple Vitamins-Calcium (ONE-A-DAY WOMENS PO)     ondansetron (ZOFRAN) 8 MG tablet     mitotane (LYSODREN) 500 MG tablet CHEMO     oxyCODONE IR (ROXICODONE) 10 MG tablet     oxyCODONE-acetaminophen (PERCOCET) 5-325 MG per tablet     [DISCONTINUED] hydrocortisone (CORTEF) 10 MG tablet     [DISCONTINUED] hydrocortisone (CORTEF) 20 MG tablet     No current facility-administered medications for this visit.         Allergies     Allergies   Allergen Reactions     Bee Venom Swelling     Ibuprofen Other (See Comments), Hives and Swelling       Medical / Surgical History     Past Medical History:   Diagnosis Date     Adrenal cortex cancer, right (H) 2018    Resected 18, Dr. Mansfield.     Adrenal mass (H)      Chocolate cyst of ovary      History of miscarriage      MTHFR mutation (H)      Past Surgical History:   Procedure Laterality Date     ADRENALECTOMY Right 2018    Procedure: ADRENALECTOMY;  Right Adrenalectomy,  Embolize Liver , Anesthesia Block;  Surgeon: Warren Mansfield MD;  Location: [facility] OR      SECTION      twice     EMBOLECTOMY ABDOMEN N/A  6/25/2018    Procedure: EMBOLECTOMY ABDOMEN;;  Surgeon: Ector Mcintyre MD;  Location: [facility] OR     GASTRIC BYPASS  2016       Social History     Social History     Social History     Marital status:      Spouse name: N/A     Number of children: N/A     Years of education: N/A     Occupational History     Not on file.     Social History Main Topics     Smoking status: Never Smoker     Smokeless tobacco: Never Used     Alcohol use Yes      Comment: occ.     Drug use: No     Sexual activity: No     Other Topics Concern     Not on file     Social History Narrative       Family History     Negative for adrenal issues    ROS     Constitutional: no fevers, chills, night sweats. + fatigue. Good appetite  Eyes: no vision changes, no eye redness, no diplopia  Ears, Nose, mouth, throat: no hearing changes, no tinnitus, no rhinorrhea, no nasal congestion  Cardiovascular: no chest pain, no orthopnea or PND, no edema, no palpitations  Respiratory: no dyspnea, no cough, no sputum, no wheezing  Gastrointestinal: no nausea, no vomiting, no abdominal pain, no diarrhea, no constipation  Genitourinary: no dysuria, no frequency, no urgency, no nocturia  Musculoskeletal: no joint pains, no back pain, no cramps, no fractures  Skin: no rash, no itching, no dryness, no ulcers, no hair loss, no nail changes  Neurological:no headaches, no weakness, no numbness, no tingling, no tremors  Psychiatric: + anxiety, + sadness  Hematologic/lymphatic: + easy bruising, no bleeding, no palor    Physical Exam   General: Comfortable, no obvious distress, normal body habitus  Eyes: Sclera anicteric, moist conjunctiva  HENT: Atraumatic, oropharynx clear, moist mucous membranes with no mucosal ulcerations, normal enamel on teeth  Neck: Trachea midline, supple. Thyroid: Thyroid is normal in size and texture  Back: no supraclavicular fat pad  CV: Regular rhythm, normal rate. No murmurs auscultated  Resp: Clear to auscultation bilaterally, good  effort  Abdomen:  Soft, non tender, non distended. Bowel sounds heard. No organomegaly. No striae  Skin: No rashes, lesions, or subcutaneous nodules.   Psych: Alert and oriented x 3. Appropriate affect, good insight  Extremities: No peripheral edema  Musculoskeletal: Appropriate muscle bulk and strength  Lymphatic: No cervical lymphadenopathy  Neuro: Moves all four extremities. No focal deficits on limited exam. No proximal muscle weakness. Gait normal.     Labs/Imaging     Pertinent Labs were reviewed and updated in [facility].  Radiology Results were  reviewed and updated in [facility].    I personally reviewed the patient's outside records from [facility]. Summary of pertinent findings in [facility].      Impression / Plan     1. Adrenal Cortical Carcinoma:  Diagnosed June 2018. Incidental Adrenal Mass 10 cm in size, with Cushing's Disease.   S/p surgery 6/25/18.     High recurrent rate due to size, invasion of capsule, high mitotic rate.   Pre-op DHEAS 740 , postop <15    Now on Mitotane (started 2 days ago).   Plan for XRT.   Follow up with Oncology    2. Cushing's disease, Adrenal source.  Resolved post-op.     3. Secondary adrenal insufficiency   Developed after adrenalectomy.   Now on mitotane, so she is expected to require steroid replacement as long as she is on mitotane.     Today the bulk of the visit was spent going over the issue of adrenal insufficiency. We discussed:   - steroid replacement: right now she is fatigued, has symptoms of adrenal insufficiency. We will increase her steroid dosage for the next few weeks as she is starting mitotane and is under medical stress. Nw dosage will be 30 mg in AM, 20 mg in PM. We will re-assess in few weeks at next visit.   - need to double the dose when ill  - need for IM rescue shot when critical, on way to ED      Follow up: 6 weeks      Roel Beverly MD  Endocrinology, Diabetes and Metabolism  [facility]

## 2018-07-25 NOTE — MR AVS SNAPSHOT
After Visit Summary   7/25/2018    Suzy Rodríguez    MRN: [id number]           Patient Information     Date Of Birth          1985        Visit Information        Provider Department      7/25/2018 2:30 PM Dane Garcia MD [facility]        Today's Diagnoses     Adrenal cortex cancer, right (H)    -  1       Follow-ups after your visit        Your next 10 appointments already scheduled     Aug 01, 2018  1:30 PM CDT   Return Visit with Skyler Gillis MD   [facility] ([facility])    [address], [address]  [facility] [address]   [phone]            Aug 01, 2018  2:00 PM CDT   SIMULATION with Skyler Gillis MD, [facility]   [facility] ([facility])    [address], [address]  [facility] [address]   [phone]            Aug 08, 2018  8:20 AM CDT   (Arrive by 8:05 AM)   NEW LUNG NODULE with Dao Lucas MD   [facility] ([facility])    [address] [place] [address]  [address] [place] [address]   [phone]            Aug 15, 2018  9:30 AM CDT   [facility]  [facility]   [facility] ([facility])    [address] [place] [address]  [address]  [facility] [address]   [phone]            Aug 15, 2018 10:15 AM CDT   (Arrive by 10:00 AM)   Return Visit with Benson Cintron MD   [facility] ([facility])    [address] [place] [address]  [address]  [facility] [address]   [phone]            Aug 21, 2018  9:00 AM CDT   (Arrive by 8:45 AM)   NEW WITH ROOM with Ector Marcelino PsyD,  2 114 CONSULT [facility] ([facility])    [address] [place] [address]  [address] [place] [address]   [phone]            Aug 23, 2018  7:20 AM CDT   (Arrive by 7:05 AM)   Return Visit with Warren Mansfield MD    [facility] ([facility])    [address]  4th Floor  [facility] [address] [phone]            Sep 19, 2018  1:30 PM CDT   (Arrive by 1:15 PM)   RETURN ENDOCRINE with Roel Beverly MD   [facility] ([facility])    [address]  3rd Floor  [facility] [id number] [phone]              Who to contact     Please call your clinic at [phone] to:    Ask questions about your health    Make or cancel appointments    Discuss your medicines    Learn about your test results    Speak to your doctor            Additional Information About Your Visit        Flavourly Information     Flavourly gives you secure access to your electronic health record. If you see a primary care provider, you can also send messages to your care team and make appointments. If you have questions, please call your primary care clinic.  If you do not have a primary care provider, please call [phone] and they will assist you.      Flavour[employer] is an electronic gateway that provides easy, online access to your medical records. With [employer], you can request a clinic appointment, read your test results, renew a prescription or communicate with your care team.     To access your existing account, please contact your [facility] or call [phone] for assistance.        Care EveryWhere ID     This is your Care EveryWhere ID. This could be used by other organizations to access your [place] medical records  [id number]        Your Vitals Were     Pulse BMI (Body Mass Index)                114 27.25 kg/m2           Blood Pressure from Last 3 Encounters:   07/25/18 126/86   07/16/18 126/89   07/14/18 113/71    Weight from Last 3 Encounters:   07/25/18 72 kg (158 lb 11.7 oz)   07/16/18 72.1 kg (159 lb)   07/14/18 70.3 kg (155 lb)              Today, you had the following     No  orders found for display         Today's Medication Changes          These changes are accurate as of 7/25/18 11:59 PM.  If you have any questions, ask your nurse or doctor.               Start taking these medicines.        Dose/Directions    hydrocortisone sodium succinate  MG injection   Commonly known as:  solu-CORTEF   Used for:  Secondary adrenal insufficiency (H)   Started by:  Roel Beverly MD        Dose:  100 mg   Inject 2 mLs (100 mg) into the muscle once for 1 dose Use in emergency situations as discussed in clinic.   Quantity:  2 mL   Refills:  1         These medicines have changed or have updated prescriptions.        Dose/Directions    hydrocortisone 10 MG tablet   Commonly known as:  CORTEF   This may have changed:    - how much to take  - how to take this  - when to take this  - additional instructions  - Another medication with the same name was removed. Continue taking this medication, and follow the directions you see here.   Changed by:  Roel Beverly MD        Take 3 pills (=30 mg) in AM and 2 pills (=20 mg) at 2 pm daily.   Quantity:  150 tablet   Refills:  11            Where to get your medicines      These medications were sent to [place], [place] - [address] [address]  [address] [facility] [address]     Phone:  [phone]     hydrocortisone 10 MG tablet    hydrocortisone sodium succinate  MG injection                Primary Care Provider Office Phone # Fax #    [facility] [phone] [address] [address]        Equal Access to Services     [facility]: Hadii greg [place], waaxda luqadaha, qaybta kaalmada adeegyada, coleman young. So [facility] [phone].    ATENCIÓN: Si habla español, tiene a lyman disposición servicios gratuitos de asistencia lingüística. Rena al [phone].    We comply with applicable federal civil rights laws and  [place] laws. We do not discriminate on the basis of race, color, national origin, age, disability, sex, sexual orientation, or gender identity.            Thank you!     Thank you for choosing [facility]  for your care. Our goal is always to provide you with excellent care. Hearing back from our patients is one way we can continue to improve our services. Please take a few minutes to complete the written survey that you may receive in the mail after your visit with us. Thank you!             Your Updated Medication List - Protect others around you: Learn how to safely use, store and throw away your medicines at www.disposemymeds.org.          This list is accurate as of 7/25/18 11:59 PM.  Always use your most recent med list.                   Brand Name Dispense Instructions for use Diagnosis    acetaminophen 325 MG tablet    TYLENOL    150 tablet    Take 2 tablets (650 mg) by mouth every 4 hours as needed for other (multimodal surgical pain management along with NSAIDS and opioid medication as indicated based on pain control and physical function.)    Adrenal mass, right (H), Acute post-operative pain       buPROPion 100 MG tablet    WELLBUTRIN     TAKE 1 TABLET (100 MG TOTAL) BY MOUTH 2 (TWO) TIMES A DAY.        calcium carbonate antacid 1000 MG Chew      Take 1 tablet by mouth every morning        diazepam 2 MG tablet    VALIUM    5 tablet    Take    Anxiety       enoxaparin 40 MG/0.4ML injection    LOVENOX    14 Syringe    Inject 0.4 mLs (40 mg) Subcutaneous daily    Adrenal mass, right (H), H/O MTHFR mutation       EPINEPHrine 0.3 MG/0.3ML injection 2-pack    EPIPEN/ADRENACLICK/or ANY BX GENERIC EQUIV     As directed for bee stings        FLUoxetine 20 MG capsule    PROzac     Take 20 mg by mouth every morning        hydrocortisone 10 MG tablet    CORTEF    150 tablet    Take 3 pills (=30 mg) in AM and 2 pills (=20 mg) at 2 pm daily.        hydrocortisone sodium succinate  MG injection     solu-CORTEF    2 mL    Inject 2 mLs (100 mg) into the muscle once for 1 dose Use in emergency situations as discussed in clinic.    Secondary adrenal insufficiency (H)       Lidocaine 4 % Patch    LIDOCARE    14 patch    Place 2 patches onto the skin every 24 hours    Adrenal mass, right (H), Acute post-operative pain       LORazepam 0.5 MG tablet    ATIVAN    30 tablet    Take 1 tablet (0.5 mg) by mouth every 8 hours as needed for anxiety    Adrenal mass (H), Anxiety       * mitotane 500 MG tablet CHEMO    LYSODREN    120 tablet    Start at 500mg twice daily, then increase to 500mg TID then 500mg QID. Ultimate goal is to take 1gm QID.    Adrenal cortex cancer, right (H)       * mitotane 500 MG tablet CHEMO    LYSODREN    70 tablet    Take 500mg twice daily for 2 weeks, then increase to 500mg TID for 2 weeks.    Adrenal cortex cancer, right (H)       ondansetron 8 MG tablet    ZOFRAN    30 tablet    Take 1 tablet (8 mg) by mouth every 8 hours as needed for nausea    Adrenal cortex cancer, right (H)       ONE-A-DAY WOMENS PO      Take 2 tablets by mouth every morning        oxyCODONE IR 10 MG tablet    ROXICODONE    22 tablet    Take 0.5-1 tablets (5-10 mg) by mouth every 4 hours as needed for moderate to severe pain    Adrenal mass, right (H), Acute post-operative pain       oxyCODONE-acetaminophen 5-325 MG per tablet    PERCOCET    12 tablet    Take 1-2 tablets by mouth every 4 hours as needed for pain        VITAMIN D-1000 MAX ST 1000 units Tabs   Generic drug:  cholecalciferol      Take 2,000 Units by mouth every morning        * Notice:  This list has 2 medication(s) that are the same as other medications prescribed for you. Read the directions carefully, and ask your doctor or other care provider to review them with you.

## 2018-07-25 NOTE — PROGRESS NOTES
RADIATION ONCOLOGY CONSULTATION  [facility]    DATE:  July 25, 2018    PATIENT NAME: Suzy Rodríguez    MEDICAL RECORD NUMBER: [id number]    REFERRING PHYSICIAN:  Dr. Mansfield    REASON FOR CONSULTATION: Consideration of adjuvant radiotherapy for T2N0M0 adrenocortical carcinoma, s/p RT adrenalectomy, positive surgical margin.     HISTORY OF PRESENT ILLNESS:    Mr. Rodríguez is a 31 yo woman with recent diagnosis of adrenocortical carcinoma. She inially presented to emergency room on 5/8/2018 with acute onset left flank pain. Given her Hx of renal stones, she was evaluated by CT abdomen and pelvis on 5/8/18 showed 9.2 x 8 cm heterogeneous right upper quadrant mass with small foci of calcification, which is likely arising from the adrenal gland though inseparable from liver and upper pole of right kidney.There was a 6 x 4 x 4 mm upper third left ureteral obstructing stone with mild to moderate secondary hydronephrosis, collection of stones at lower pole left kidney extending over an area of approximately 6 x 7 x  4 mm, a non-obstructing 1 mm stone upper pole left kidney and a 2 mm nonobstructing stone noted upper pole right kidney with no hydronephrosis on the right.     She underwent left nephrolithiasis on 5/11/18 by Dr. Delvalle at [facility] urology clinic.Then she was referred to Dr. Patel for evaluation of her adrenal mass. On further questioning, She had noticed, over the past 8-9 months, large amount of facial hair, irregular periods and some facial swelling.She denies any palpitations, headaches, or sweats.    Then she was referred Dr. Mansfield for possible surgical resection. The plan was to proceed with surgery given that the tumor mass was too larg to be observed.    She underwent right adrenalectomy on 6/25/18. The surgical pathology ([id number]) was low grade adrenal cortical carcinoma of a mass sized 11.3 cm. The tumor shows necrosis and invasion into the adrenal capsule. The  margins were involved by the tumor.The tumor was less than 25% clear cells and a mitotic rate of 15 per 50 HPF. The Ki-67 mitotic rate was 20%. pO1G2N1    For staging workup, she underwent MRI of the brain on 18 showed no evidence of brain metastases. Also, she had PET scan on 18 showed no suspicious metastatic lesions.    Today, she presents to the clinic for consideration of radiotherapy. She is recovering well from surgery. She reports having fatigue related to exertion, SOB likely related to her pleural effusion, however, she does not need any medications or oxygen. Also, she reports mild RUQ pain, increased by deep inspiration. She takes tylenol PRN.     PMH:   Past Medical History:   Diagnosis Date     Adrenal cortex cancer, right (H) 2018    Resected 18, Dr. Mansfield.     Adrenal mass (H)      Chocolate cyst of ovary      History of miscarriage      MTHFR mutation (H)        PSH:  Past Surgical History:   Procedure Laterality Date     ADRENALECTOMY Right 2018    Procedure: ADRENALECTOMY;  Right Adrenalectomy,  Embolize Liver , Anesthesia Block;  Surgeon: Warren Mansfield MD;  Location: [facility] OR      SECTION      twice     EMBOLECTOMY ABDOMEN N/A 2018    Procedure: EMBOLECTOMY ABDOMEN;;  Surgeon: Ector Mcintyre MD;  Location: [facility] OR     GASTRIC BYPASS         MEDICATIONS:  Current Outpatient Prescriptions   Medication Sig Dispense Refill     acetaminophen (TYLENOL) 325 MG tablet Take 2 tablets (650 mg) by mouth every 4 hours as needed for other (multimodal surgical pain management along with NSAIDS and opioid medication as indicated based on pain control and physical function.) 150 tablet 0     buPROPion (WELLBUTRIN) 100 MG tablet TAKE 1 TABLET (100 MG TOTAL) BY MOUTH 2 (TWO) TIMES A DAY.  3     calcium carbonate antacid 1000 MG CHEW Take 1 tablet by mouth every morning        cholecalciferol (VITAMIN D-1000 MAX ST) 1000 units TABS Take 2,000 Units by  mouth every morning        diazepam (VALIUM) 2 MG tablet Take 5 tablet 0     enoxaparin (LOVENOX) 40 MG/0.4ML injection Inject 0.4 mLs (40 mg) Subcutaneous daily 14 Syringe 0     EPINEPHrine (EPIPEN/ADRENACLICK/OR ANY BX GENERIC EQUIV) 0.3 MG/0.3ML injection 2-pack As directed for bee stings       FLUoxetine (PROZAC) 20 MG capsule Take 20 mg by mouth every morning        hydrocortisone (CORTEF) 10 MG tablet Take 3 pills (=30 mg) in AM and 2 pills (=20 mg) at 2 pm daily. 150 tablet 11     Lidocaine (LIDOCARE) 4 % Patch Place 2 patches onto the skin every 24 hours 14 patch 0     LORazepam (ATIVAN) 0.5 MG tablet Take 1 tablet (0.5 mg) by mouth every 8 hours as needed for anxiety 30 tablet 0     mitotane (LYSODREN) 500 MG tablet CHEMO Take 500mg twice daily for 2 weeks, then increase to 500mg TID for 2 weeks. (Patient not taking: Reported on 7/25/2018) 70 tablet 0     mitotane (LYSODREN) 500 MG tablet CHEMO Start at 500mg twice daily, then increase to 500mg TID then 500mg QID. Ultimate goal is to take 1gm QID. 120 tablet 0     Multiple Vitamins-Calcium (ONE-A-DAY WOMENS PO) Take 2 tablets by mouth every morning        ondansetron (ZOFRAN) 8 MG tablet Take 1 tablet (8 mg) by mouth every 8 hours as needed for nausea 30 tablet 0     oxyCODONE IR (ROXICODONE) 10 MG tablet Take 0.5-1 tablets (5-10 mg) by mouth every 4 hours as needed for moderate to severe pain (Patient not taking: Reported on 7/25/2018) 22 tablet 0     oxyCODONE-acetaminophen (PERCOCET) 5-325 MG per tablet Take 1-2 tablets by mouth every 4 hours as needed for pain (Patient not taking: Reported on 7/25/2018) 12 tablet 0     [DISCONTINUED] hydrocortisone (CORTEF) 10 MG tablet Take 1 tablet (10 mg) by mouth daily At 2pm until follow up with Endocrinology (total: 30mg daily) 30 tablet 1     [DISCONTINUED] hydrocortisone (CORTEF) 20 MG tablet Take 1 tablet (20 mg) by mouth every morning (then 10mg at 2pm) until followup with Endocrinology 30 tablet 1      ALLERGY:  Allergies   Allergen Reactions     Bee Venom Swelling     Ibuprofen Other (See Comments), Hives and Swelling     FAMILY HISTORY:  History reviewed. No pertinent family history.    SOCIAL HISTORY  Social History   Substance Use Topics     Smoking status: Never Smoker     Smokeless tobacco: Never Used     Alcohol use Yes      Comment: occ.       ROS: 10 point ROS reviewed as reported on the nursing assessment note.    PHYSICAL EXAMINATION:  VS: Vitals: /86  Pulse 114  Wt 72 kg (158 lb 11.7 oz)  BMI 27.25 kg/m2  BMI: Body mass index is 27.25 kg/(m^2).  GENERAL: Appears well, in no acute distress.   CHEST: Cear bilateral breath sounds without wheezes or crackles.  ABDOMEN:  Clean surgical scar, no redness or edema. Bilateral abdominal striae. Soft and non tender without mass.    RADIOLOGY:  CT ABDOMEN AND PELVIS: on 5/8/18  IMPRESSION:  1.  6 x 4 x 4 mm left upper third ureteral obstructing stone with mild to moderate secondary hydronephrosis. Additional bilateral nonobstructive nephrolithiasis.  2.  Indeterminate mass right upper quadrant likely arising from the adrenal gland though inseparable from liver and kidney. Comparison with prior recommended. Additional indeterminate mass within the liver.  3.  Diverticulosis without diverticulitis.  4.  Small fat-containing paraumbilical hernia.  5.  Abnormal soft tissue density in subcutaneous fat adjacent to left rectus abdominis muscle. Correlate with prior.    PET SCAN: on 7/23/18  IMPRESSION:  1. Residual fluid collection at the vicinity of the adrenal gland with mild peripheral FDG uptake. This may represent postsurgical fluid collection.   2. Increased right pleural effusion since 6/29/2018. There is a plaque-like pleural thickening with mild elevated FDG uptake. This finding is indeterminant, while its favored to represent an infectious/inflammatory condition, metastasis cannot be clearly excluded. Attention on follow up.  3. Non-FDG avid 2.5  left complex adnexal cyst.    IMPRESSION:T2N0M0 adrenocortical carcinoma, s/p RT radical adrenalectomy, positive surgical margin.     RECOMMENDATION: We reviewed the clinical history, imaging, the pathology and the staging workup of the patient. We had an extensive discussion with the patient about the adjuvant treatment options for her disease. We explained that the curative treatment for the adrenal tumors is surgery. However, the reported recurrence rate is 30-40%. The role of adjuvant radiation in such rare tumors in still controversial and the available data is debatable. However, given her young age, large tumor size, capsular invasion and positive surgical margin status, we recommend adjuvant radiotherapy for more local control of her disease with no impact on the survival.    Additionally, we explained the indication, logistics and the acute and late related side effects associated with the radiotherapy. We explained that there are some organs surrounding the adrenal gland might be at great risk from radiation; the right kidney which probably got damaged up to 50%, the liver, the intestine, the bowel and the ovaries. We told the patient that she might develop abdominal pain, nausea, vomiting, or diarrhea secondary to bowel toxicity. We also told her that her ovaries might be affected to the degree that she might need for an estrogen replacement therapy in the future.    The patient was favoring radiotherapy and was wishing if she could start ASAP. We explained that there is no need to hurry as she still recovers from surgery and she can receive the radiotherapy approximately 8 weeks after surgery. Also, she has seen Dr. Cintron who decided to start her on adjuvant mitotane and she started yesterday. She will go to [facility] next Monday for a second opinion. She prefers to have treatments at [facility] as she lives at [place].         The patient and her  had many questions during our  conversation today, which were answered to their satisfaction, and verbalized understanding.       Ms. Rodríguez was seen and discussed with the staff, Dr. Garcia.         Pee Mills MD  PGY-2 Resident, Radiation Oncology  [facility]    I saw the patient with the resident.  I agree with the resident's note and plan of care.      SANTOS Garcia M.D.  Department of Radiation Oncology  [facility]

## 2018-07-26 ENCOUNTER — COMMUNICATION - HEALTHEAST (OUTPATIENT)
Dept: FAMILY MEDICINE | Facility: CLINIC | Age: 33
End: 2018-07-26

## 2018-07-26 ENCOUNTER — MYC MEDICAL ADVICE (OUTPATIENT)
Dept: UROLOGY | Facility: CLINIC | Age: 33
End: 2018-07-26

## 2018-07-27 ENCOUNTER — CARE COORDINATION (OUTPATIENT)
Dept: ONCOLOGY | Facility: CLINIC | Age: 33
End: 2018-07-27

## 2018-07-27 NOTE — PROGRESS NOTES
TC to pt per Dr. Cintron:    RE: Condition update  Received: Today       Benson Cintron MD Jankovich, Diana, RN                   My first step for insomina mgmt is for pts to try over the counter melatonin 1 to 3 mg HS PRN. Also, education about proper sleep hygiene is important. Please guide her to this resource: http://www.sleepeducation.org/essentials-in-sleep/healthy-sleep-habits   Thanks,   AR            Previous Messages       ----- Message -----      From: Padmini Infante, RN      Sent: 7/27/2018  12:35 PM        To: Benson Cintron MD   Subject: Condition update                                 Hel!     I just spoke with pt about her upcoming appointments, and she mentioned that she's been having insomnia and is wondering if you'd prescribe something for her for sleep.     I told her I would relay this request, but that she may need to see her PCP or we may set her up with palliative care. Let me know what you think. Thanks!     -Padmini CARRERO left for pt outlining all of Dr. Cintron's recommendations per above. Requested pt call clinic with any further questions or concerns.

## 2018-07-27 NOTE — PROGRESS NOTES
TC to Dr. Huertas's office per My Chart message below. Dr. Huertas's office stated that they don't have any openings before 09/11/2018.     Dana Griffin, RN   You 23 hours ago (1:23 PM)                 Hello,     Could you outreach on this? No idea what Dr. Huertas's schedule is like or if Dr. Cintron prefers to call?     Thanks (Routing comment)                        Suzy Cintron MD 23 hours ago (1:20 PM)                          Hi Dr. Cintron,     As we discussed at my appointment last week, I am going to see Dr. Huertas from the [facility]. My appointment with him is scheduled on September 11th.  I went to see Dr Garcia from radiation oncology, and he would like me to get my second opinions before we start radiology, however he would like to start radiation as soon as possible. I have an appointment for the radiation mapping next week, so hopefully I'll be able to start soon.  I was wondering if there was anyway you could call to see if he would be able to schedule me in sooner?     Thanks,   Suzy         TC to pt to let her know what Dr. Huertas's office said, and she stated that she did reach someone there yesterday who got her in on 08/14/2018. Pt stated she will also being getting another opinion from [facility] on Monday.     Pt also stated that she's been having insomnia and if Dr. Cintron would consider prescribing something for her. Told pt that Dr. Cintron will be contacted and writer will call her with a response. Inbasket sent to Dr. Cintron. Await response.

## 2018-07-28 ENCOUNTER — OFFICE VISIT - HEALTHEAST (OUTPATIENT)
Dept: FAMILY MEDICINE | Facility: CLINIC | Age: 33
End: 2018-07-28

## 2018-07-28 DIAGNOSIS — R30.0 DYSURIA: ICD-10-CM

## 2018-07-28 LAB
ALBUMIN UR-MCNC: NEGATIVE MG/DL
APPEARANCE UR: CLEAR
BACTERIA #/AREA URNS HPF: ABNORMAL HPF
BILIRUB UR QL STRIP: NEGATIVE
COLOR UR AUTO: YELLOW
GLUCOSE UR STRIP-MCNC: NEGATIVE MG/DL
HGB UR QL STRIP: NEGATIVE
KETONES UR STRIP-MCNC: NEGATIVE MG/DL
LEUKOCYTE ESTERASE UR QL STRIP: NEGATIVE
NITRATE UR QL: NEGATIVE
PH UR STRIP: 7 [PH] (ref 5–8)
RBC #/AREA URNS AUTO: ABNORMAL HPF
SP GR UR STRIP: 1.01 (ref 1–1.03)
SQUAMOUS #/AREA URNS AUTO: ABNORMAL LPF
UROBILINOGEN UR STRIP-ACNC: NORMAL
WBC #/AREA URNS AUTO: ABNORMAL HPF

## 2018-07-30 NOTE — TELEPHONE ENCOUNTER
From: Suzy Rodríguez  To: Benson Cintron MD  Sent: 7/25/2018 5:22 PM CDT  Subject: Updates about my health    Dr. Cintron,    So during my PET scan Monday the tech noticed that the blood/fluid near my lung chest cavity has actually increased in size or amount since last scan and not decreased as expected. He said it was not emergent but needed to be addressed soon. We had endocrinology and radiology oncology at the  on Wednesday and we reviewed PET scan results with that Doctor. We are proceeded with radiation steps. Let me know your thoughts and what step would be next to address that fluid issue. I sent this message to Dr. Mansfield the other day but he has not answered yet. Radiation today told us PET Scan was unremarkable, finally some good news :) Thanks,    -Suzy

## 2018-07-31 ENCOUNTER — TELEPHONE (OUTPATIENT)
Dept: ONCOLOGY | Facility: CLINIC | Age: 33
End: 2018-07-31

## 2018-07-31 ENCOUNTER — TELEPHONE (OUTPATIENT)
Dept: UROLOGY | Facility: CLINIC | Age: 33
End: 2018-07-31

## 2018-07-31 ENCOUNTER — COMMUNICATION - HEALTHEAST (OUTPATIENT)
Dept: SCHEDULING | Facility: CLINIC | Age: 33
End: 2018-07-31

## 2018-07-31 ENCOUNTER — MYC MEDICAL ADVICE (OUTPATIENT)
Dept: ONCOLOGY | Facility: CLINIC | Age: 33
End: 2018-07-31

## 2018-07-31 DIAGNOSIS — J90 RECURRENT RIGHT PLEURAL EFFUSION: Primary | ICD-10-CM

## 2018-07-31 DIAGNOSIS — Z85.858: ICD-10-CM

## 2018-07-31 NOTE — TELEPHONE ENCOUNTER
"Oral Chemotherapy Monitoring Program    Primary Oncologist: Dr. Cintron  Primary Oncology Clinic: [facility]  Cancer Diagnosis: Adrenal Cancer     Therapy History:  Mitotane 500mg  BID for 2 weeks, then TID for 2 weeks, then QID  Started 7/24/18     Drug Interaction Assessment: No new drug interactions identified at this time.     Lab Monitoring Plan  CBC, CMP, DHEAS monthly. Endocrine q6 months.  Subjective/Objective:  Suzy Rodríguez is a 32 year old female contacted by phone for a follow-up visit for oral chemotherapy.  Suzy reports that the main side effect she has noticed since starting mitotane has been a feeling of restless/jittery legs, that keeps her up at night. She said it occurs every night and makes it very difficult to sleep. Suzy is wondering if she can have something prescribed to help deal with this, she has tried taking lorazepam and oxycodone to help, but would like something that addresses restless legs. Aside from the legs, the only other side effect Suzy has been experiencing is an occasional stomach ache. She takes Zofran 30 minutes before her medication, and that seems to help. The diarrhea has improved, and she stated that she has not needed Immodium. Suzy reports that next Tuesday (8/07) she will increasing the mitotane dose to 500mg TID.     ORAL CHEMOTHERAPY 7/20/2018 7/24/2018 7/31/2018   Drug Name Lysodren (Mitotane) Lysodren (Mitotane) Lysodren (Mitotane)   Current Dosage 500mg - 500mg   Current Schedule Other Other Other   Cycle Details Continuous Continuous Continuous   Start Date of Last Cycle - 7/24/2018 7/24/2018   Adherence Assessment - - Adherent   Any new drug interactions? - No -       Vitals:  BP:   BP Readings from Last 1 Encounters:   07/25/18 126/86     Wt Readings from Last 1 Encounters:   07/25/18 72 kg (158 lb 11.7 oz)     Estimated body surface area is 1.8 meters squared as calculated from the following:    Height as of 7/16/18: 1.626 m (5' 4\").    " Weight as of 7/25/18: 72 kg (158 lb 11.7 oz).    Labs:  _  Result Component Current Result Ref Range   Sodium 136 (7/14/2018) 133 - 144 mmol/L     _  Result Component Current Result Ref Range   Potassium 3.5 (7/14/2018) 3.4 - 5.3 mmol/L     _  Result Component Current Result Ref Range   Calcium 8.7 (7/14/2018) 8.5 - 10.1 mg/dL     No results found for Mag within last 30 days.     No results found for Phos within last 30 days.     _  Result Component Current Result Ref Range   Albumin 3.4 (7/14/2018) 3.4 - 5.0 g/dL     _  Result Component Current Result Ref Range   Urea Nitrogen 6 (L) (7/14/2018) 7 - 30 mg/dL     _  Result Component Current Result Ref Range   Creatinine 0.80 (7/14/2018) 0.52 - 1.04 mg/dL       _  Result Component Current Result Ref Range   AST 25 (7/14/2018) 0 - 45 U/L     _  Result Component Current Result Ref Range   ALT 39 (7/14/2018) 0 - 50 U/L     _  Result Component Current Result Ref Range   Bilirubin Total 0.5 (7/14/2018) 0.2 - 1.3 mg/dL       _  Result Component Current Result Ref Range   WBC 7.6 (7/14/2018) 4.0 - 11.0 10e9/L     _  Result Component Current Result Ref Range   Hemoglobin 8.3 (L) (7/14/2018) 11.7 - 15.7 g/dL     _  Result Component Current Result Ref Range   Platelet Count 464 (H) (7/14/2018) 150 - 450 10e9/L     _  Result Component Current Result Ref Range   Absolute Neutrophil 5.6 (7/14/2018) 1.6 - 8.3 10e9/L       Assessment:  Suzy is tolerating mitotane therapy at this time with some side effects.    Plan:  Continue mitotane.  Notify care team of restless legs to see if they can prescribe Suzy something to help at night.    Follow-Up:  Review 8/15 appointment with Dr. Abdulaziz Asencio  Pharmacy Intern  Oral Chemotherapy Monitoring Program  [facility]  [phone]

## 2018-07-31 NOTE — TELEPHONE ENCOUNTER
I called the patient's . The patient was sleeping. I informed them to delay radiotherapy planning until after the [facility] appointment. A second opinion at [facility] did not recommend post op radiotherapy.    SANTOS Garcia M.D.  Department of Radiation Oncology  [facility]

## 2018-07-31 NOTE — TELEPHONE ENCOUNTER
"Placed call to Suzy regarding Dr. Cintron's recommendations for restless legs:    \"Lulandy, can you find out if it was associated with increase in stress-dose steroids or mitotane doses? And what dose of mitotane is she on?     Regardless, plz let her know that \"certain lifestyle changes and activities may provide some relief in persons with mild to moderate symptoms of restless legs. These steps include avoiding or decreasing the use of alcohol and tobacco, changing or maintaining a regular sleep pattern, a program of moderate exercise, and massaging the legs, taking a warm bath, or using a heating pad or ice pack.\"     If these don't work, next step would be a trial of gabapentin.   AR\"    Suzy is in agreement with Dr. Cintron's recommendations, and stated that she started both the increased dose of steroids and mitotane on the same day, so she was not sure which one was causing the restless legs.    Alexander Asencio  Pharmacy Intern  Oral Chemotherapy Monitoring Program  [facility]  [phone]    "

## 2018-08-01 ENCOUNTER — OFFICE VISIT - HEALTHEAST (OUTPATIENT)
Dept: FAMILY MEDICINE | Facility: CLINIC | Age: 33
End: 2018-08-01

## 2018-08-01 ENCOUNTER — HOSPITAL ENCOUNTER (OUTPATIENT)
Facility: CLINIC | Age: 33
End: 2018-08-01
Attending: INTERNAL MEDICINE | Admitting: INTERNAL MEDICINE
Payer: COMMERCIAL

## 2018-08-01 ENCOUNTER — TELEPHONE (OUTPATIENT)
Dept: PULMONOLOGY | Facility: CLINIC | Age: 33
End: 2018-08-01

## 2018-08-01 DIAGNOSIS — R53.0 NEOPLASTIC MALIGNANT RELATED FATIGUE: ICD-10-CM

## 2018-08-01 DIAGNOSIS — E24.9 CUSHING'S SYNDROME (H): ICD-10-CM

## 2018-08-01 DIAGNOSIS — F41.1 GENERALIZED ANXIETY DISORDER: ICD-10-CM

## 2018-08-01 DIAGNOSIS — E27.40 ADRENAL INSUFFICIENCY (H): ICD-10-CM

## 2018-08-01 DIAGNOSIS — C74.01 MALIGNANT NEOPLASM OF CORTEX OF RIGHT ADRENAL GLAND (H): ICD-10-CM

## 2018-08-01 DIAGNOSIS — F33.1 MODERATE EPISODE OF RECURRENT MAJOR DEPRESSIVE DISORDER (H): ICD-10-CM

## 2018-08-01 ASSESSMENT — MIFFLIN-ST. JEOR: SCORE: 1391.23

## 2018-08-01 NOTE — TELEPHONE ENCOUNTER
Spoke with patient to schedule procedure with Dr. Melissa Lucas   Procedure was scheduled on 08/02 in ENDO at  10am  Patient is aware a / is needed day of surgery.   Surgery letter was sent via [employer], patient has my direct contact information for any further questions.

## 2018-08-06 ENCOUNTER — HOSPITAL ENCOUNTER (OUTPATIENT)
Facility: CLINIC | Age: 33
End: 2018-08-06
Attending: INTERNAL MEDICINE | Admitting: INTERNAL MEDICINE
Payer: COMMERCIAL

## 2018-08-06 NOTE — TELEPHONE ENCOUNTER
Per viridiana message from LINDA Coello, called patient to inform her she was scheduled for tomorrow at 1:30pm for Thoracentesis with Dr. Lucas in ENDO.     Letter sent via My Chart. Patient had no questions.

## 2018-08-07 ENCOUNTER — SURGERY (OUTPATIENT)
Age: 33
End: 2018-08-07

## 2018-08-07 ENCOUNTER — HOSPITAL ENCOUNTER (OUTPATIENT)
Dept: GENERAL RADIOLOGY | Facility: CLINIC | Age: 33
End: 2018-08-07
Attending: INTERNAL MEDICINE | Admitting: INTERNAL MEDICINE
Payer: COMMERCIAL

## 2018-08-07 ENCOUNTER — HOSPITAL ENCOUNTER (OUTPATIENT)
Facility: CLINIC | Age: 33
Discharge: HOME | End: 2018-08-07
Attending: INTERNAL MEDICINE | Admitting: INTERNAL MEDICINE
Payer: COMMERCIAL

## 2018-08-07 VITALS
RESPIRATION RATE: 16 BRPM | HEART RATE: 80 BPM | SYSTOLIC BLOOD PRESSURE: 117 MMHG | DIASTOLIC BLOOD PRESSURE: 76 MMHG | OXYGEN SATURATION: 98 %

## 2018-08-07 DIAGNOSIS — J90 PLEURAL EFFUSION ON RIGHT: ICD-10-CM

## 2018-08-07 DIAGNOSIS — J90 PLEURAL EFFUSION ON RIGHT: Primary | ICD-10-CM

## 2018-08-07 PROCEDURE — 40000873 ZZH CANCELLED SURGERY UP TO 15 MINS: Performed by: INTERNAL MEDICINE

## 2018-08-07 PROCEDURE — 71046 X-RAY EXAM CHEST 2 VIEWS: CPT

## 2018-08-08 NOTE — PROCEDURES
Patient came in for right sided thoracentesis.  Pre-procedure CXR obtained revealing no effusion therefore the procedure cancelled.    Melissa Lucas MD

## 2018-08-10 ENCOUNTER — OFFICE VISIT - HEALTHEAST (OUTPATIENT)
Dept: FAMILY MEDICINE | Facility: CLINIC | Age: 33
End: 2018-08-10

## 2018-08-10 DIAGNOSIS — C74.01 ADRENAL CORTICAL ADENOCARCINOMA OF RIGHT ADRENAL GLAND (H): ICD-10-CM

## 2018-08-10 DIAGNOSIS — F33.1 MODERATE EPISODE OF RECURRENT MAJOR DEPRESSIVE DISORDER (H): ICD-10-CM

## 2018-08-10 DIAGNOSIS — F41.1 GENERALIZED ANXIETY DISORDER: ICD-10-CM

## 2018-08-10 ASSESSMENT — MIFFLIN-ST. JEOR: SCORE: 1388.96

## 2018-08-12 RX ORDER — EPINEPHRINE 1 MG/ML
0.3 INJECTION, SOLUTION, CONCENTRATE INTRAVENOUS EVERY 5 MIN PRN
Status: CANCELLED | OUTPATIENT
Start: 2018-08-15

## 2018-08-12 RX ORDER — EPINEPHRINE 0.3 MG/.3ML
0.3 INJECTION SUBCUTANEOUS EVERY 5 MIN PRN
Status: CANCELLED | OUTPATIENT
Start: 2018-08-15

## 2018-08-12 RX ORDER — MEPERIDINE HYDROCHLORIDE 25 MG/ML
25 INJECTION INTRAMUSCULAR; INTRAVENOUS; SUBCUTANEOUS EVERY 30 MIN PRN
Status: CANCELLED | OUTPATIENT
Start: 2018-08-15

## 2018-08-12 RX ORDER — ALBUTEROL SULFATE 0.83 MG/ML
2.5 SOLUTION RESPIRATORY (INHALATION)
Status: CANCELLED | OUTPATIENT
Start: 2018-08-15

## 2018-08-12 RX ORDER — ALBUTEROL SULFATE 90 UG/1
1-2 AEROSOL, METERED RESPIRATORY (INHALATION)
Status: CANCELLED
Start: 2018-08-15

## 2018-08-12 RX ORDER — DIPHENHYDRAMINE HYDROCHLORIDE 50 MG/ML
50 INJECTION INTRAMUSCULAR; INTRAVENOUS
Status: CANCELLED
Start: 2018-08-15

## 2018-08-12 RX ORDER — METHYLPREDNISOLONE SODIUM SUCCINATE 125 MG/2ML
125 INJECTION, POWDER, LYOPHILIZED, FOR SOLUTION INTRAMUSCULAR; INTRAVENOUS
Status: CANCELLED
Start: 2018-08-15

## 2018-08-12 RX ORDER — SODIUM CHLORIDE 9 MG/ML
1000 INJECTION, SOLUTION INTRAVENOUS CONTINUOUS PRN
Status: CANCELLED
Start: 2018-08-15

## 2018-08-14 ENCOUNTER — RECORDS - HEALTHEAST (OUTPATIENT)
Dept: ADMINISTRATIVE | Facility: OTHER | Age: 33
End: 2018-08-14

## 2018-08-15 DIAGNOSIS — C74.01 ADRENAL CORTEX CANCER, RIGHT (H): Primary | ICD-10-CM

## 2018-08-17 ENCOUNTER — PRE VISIT (OUTPATIENT)
Dept: UROLOGY | Facility: CLINIC | Age: 33
End: 2018-08-17

## 2018-08-17 ENCOUNTER — OFFICE VISIT (OUTPATIENT)
Dept: RADIATION THERAPY | Facility: OUTPATIENT CENTER | Age: 33
End: 2018-08-17
Payer: COMMERCIAL

## 2018-08-17 ENCOUNTER — RECORDS - HEALTHEAST (OUTPATIENT)
Dept: ADMINISTRATIVE | Facility: OTHER | Age: 33
End: 2018-08-17

## 2018-08-17 ENCOUNTER — APPOINTMENT (OUTPATIENT)
Dept: RADIATION THERAPY | Facility: OUTPATIENT CENTER | Age: 33
End: 2018-08-17
Payer: COMMERCIAL

## 2018-08-17 VITALS
SYSTOLIC BLOOD PRESSURE: 118 MMHG | HEART RATE: 102 BPM | DIASTOLIC BLOOD PRESSURE: 81 MMHG | OXYGEN SATURATION: 99 % | RESPIRATION RATE: 18 BRPM | BODY MASS INDEX: 27.43 KG/M2 | WEIGHT: 159.8 LBS

## 2018-08-17 DIAGNOSIS — C74.01 ADRENAL CORTEX CANCER, RIGHT (H): Primary | ICD-10-CM

## 2018-08-17 NOTE — MR AVS SNAPSHOT
After Visit Summary   8/17/2018    Suzy Rodríguez    MRN: [id number]           Patient Information     Date Of Birth          1985        Visit Information        Provider Department      8/17/2018 8:45 AM Cesar Monsivais MD [facility]         Follow-ups after your visit        Your next 10 appointments already scheduled     Aug 20, 2018 12:15 PM CDT   [facility] with  [facility]   [facility] ([facility])    [address] [place] [address]  [address]  [facility] [id number] [phone]            Aug 20, 2018 12:45 PM CDT   (Arrive by 12:30 PM)   Return Visit with Benson Cintron MD   [facility] ([facility])    [address] [place] [address]  [address]  [facility] [id number] [phone]            Aug 23, 2018  7:20 AM CDT   (Arrive by 7:05 AM)   Return Visit with Warren Mansfield MD   [facility] ([facility])    [address] [place] [address]  4th Floor  [facility] [id number] [phone]            Aug 27, 2018  8:00 AM CDT   [facility]    [facility]  [address] [place] [address]   [phone]            Sep 19, 2018  1:30 PM CDT   (Arrive by 1:15 PM)   RETURN ENDOCRINE with Roel Beverly MD   [facility] Endocrinology ([facility])    [address] [place] [address]  3rd Floor  [facility] [id number] [phone]              Who to contact     Please call your clinic at [phone] to:    Ask questions about your health    Make or cancel appointments    Discuss your medicines    Learn about your test results    Speak to your doctor            Additional Information About Your Visit        MyChart Information     MyChart gives you secure access to your electronic health  record. If you see a primary care provider, you can also send messages to your care team and make appointments. If you have questions, please call your primary care clinic.  If you do not have a primary care provider, please call [phone] and they will assist you.      [employer] is an electronic gateway that provides easy, online access to your medical records. With [employer], you can request a clinic appointment, read your test results, renew a prescription or communicate with your care team.     To access your existing account, please contact your [facility] or call [phone] for assistance.        Care EveryWhere ID     This is your Care EveryWhere ID. This could be used by other organizations to access your [facility] medical records  [id number]        Your Vitals Were     Pulse Respirations Pulse Oximetry BMI (Body Mass Index)          102 18 99% 27.43 kg/m2         Blood Pressure from Last 3 Encounters:   08/17/18 118/81   08/07/18 117/76   07/25/18 126/86    Weight from Last 3 Encounters:   08/17/18 72.5 kg (159 lb 12.8 oz)   07/25/18 72 kg (158 lb 11.7 oz)   07/16/18 72.1 kg (159 lb)              Today, you had the following     No orders found for display       Primary Care Provider Office Phone # Fax #    [facility] [phone]       [facility] [address]        Equal Access to Services     MARQUITA VALADEZ AH: Hadii greg thibodeaux hadasho Sodonitaali, waaxda luqadaha, qaybta kaalmada adeegyada, coleman putnam . So [facility] [phone].    ATENCIÓN: Si habla español, tiene a lyman disposición servicios gratuitos de asistencia lingüística. Rena al [phone].    We comply with applicable federal civil rights laws and [place] laws. We do not discriminate on the basis of race, color, national origin, age, disability, sex, sexual orientation, or gender identity.            Thank you!     Thank you for choosing  [facility]  for your care. Our goal is always to provide you with excellent care. Hearing back from our patients is one way we can continue to improve our services. Please take a few minutes to complete the written survey that you may receive in the mail after your visit with us. Thank you!             Your Updated Medication List - Protect others around you: Learn how to safely use, store and throw away your medicines at www.disposemymeds.org.          This list is accurate as of 8/17/18 11:09 AM.  Always use your most recent med list.                   Brand Name Dispense Instructions for use Diagnosis    acetaminophen 325 MG tablet    TYLENOL    150 tablet    Take 2 tablets (650 mg) by mouth every 4 hours as needed for other (multimodal surgical pain management along with NSAIDS and opioid medication as indicated based on pain control and physical function.)    Adrenal mass, right (H), Acute post-operative pain       buPROPion 100 MG tablet    WELLBUTRIN     TAKE 1 TABLET (100 MG TOTAL) BY MOUTH 2 (TWO) TIMES A DAY.        calcium carbonate antacid 1000 MG Chew      Take 1 tablet by mouth every morning        diazepam 2 MG tablet    VALIUM    5 tablet    Take    Anxiety       enoxaparin 40 MG/0.4ML injection    LOVENOX    14 Syringe    Inject 0.4 mLs (40 mg) Subcutaneous daily    Adrenal mass, right (H), H/O MTHFR mutation       EPINEPHrine 0.3 MG/0.3ML injection 2-pack    EPIPEN/ADRENACLICK/or ANY BX GENERIC EQUIV     As directed for bee stings        FLUoxetine 20 MG capsule    PROzac     Take 20 mg by mouth every morning        hydrocortisone 10 MG tablet    CORTEF    150 tablet    Take 3 pills (=30 mg) in AM and 2 pills (=20 mg) at 2 pm daily.        hydrocortisone sodium succinate  MG injection    solu-CORTEF    2 mL    Inject 2 mLs (100 mg) into the muscle once for 1 dose Use in emergency situations as discussed in clinic.    Secondary adrenal insufficiency (H)       Lidocaine 4 % Patch     LIDOCARE    14 patch    Place 2 patches onto the skin every 24 hours    Adrenal mass, right (H), Acute post-operative pain       LORazepam 0.5 MG tablet    ATIVAN    30 tablet    Take 1 tablet (0.5 mg) by mouth every 8 hours as needed for anxiety    Adrenal mass (H), Anxiety       * mitotane 500 MG tablet CHEMO    LYSODREN    120 tablet    Start at 500mg twice daily, then increase to 500mg TID then 500mg QID. Ultimate goal is to take 1gm QID.    Adrenal cortex cancer, right (H)       * mitotane 500 MG tablet CHEMO    LYSODREN    120 tablet    Start at 500mg twice daily, then increase to 500mg TID then 500mg QID. Ultimate goal is to take 1gm QID.    Adrenal cortex cancer, right (H)       ondansetron 8 MG tablet    ZOFRAN    30 tablet    Take 1 tablet (8 mg) by mouth every 8 hours as needed for nausea    Adrenal cortex cancer, right (H)       ONE-A-DAY WOMENS PO      Take 2 tablets by mouth every morning        oxyCODONE IR 10 MG tablet    ROXICODONE    22 tablet    Take 0.5-1 tablets (5-10 mg) by mouth every 4 hours as needed for moderate to severe pain    Adrenal mass, right (H), Acute post-operative pain       oxyCODONE-acetaminophen 5-325 MG per tablet    PERCOCET    12 tablet    Take 1-2 tablets by mouth every 4 hours as needed for pain        VITAMIN D-1000 MAX ST 1000 units Tabs   Generic drug:  cholecalciferol      Take 2,000 Units by mouth every morning        * Notice:  This list has 2 medication(s) that are the same as other medications prescribed for you. Read the directions carefully, and ask your doctor or other care provider to review them with you.

## 2018-08-17 NOTE — LETTER
2018      RE: Suzy Rodríguez  [address]  [facility] [address]          Department of Radiation Oncology  [facility]  [address] [place] [address]  [phone]       Radiation Oncology Follow-up Visit  2018      Suzy Rodríguez  MRN: [id number]   : 1985       HISTORY OF PRESENT ILLNESS:    Mr. Rodríguez is a 31 yo woman with recent diagnosis of adrenocortical carcinoma. She inially presented to emergency room on 2018 with acute onset left flank pain. Given her Hx of renal stones, she was evaluated by CT abdomen and pelvis on 18 showed 9.2 x 8 cm heterogeneous right upper quadrant mass with small foci of calcification, which is likely arising from the adrenal gland though inseparable from liver and upper pole of right kidney.There was a 6 x 4 x 4 mm upper third left ureteral obstructing stone with mild to moderate secondary hydronephrosis, collection of stones at lower pole left kidney extending over an area of approximately 6 x 7 x  4 mm, a non-obstructing 1 mm stone upper pole left kidney and a 2 mm nonobstructing stone noted upper pole right kidney with no hydronephrosis on the right.      She underwent left [facility]rol[facility] 18 [facility]Then she was referred to Dr. Patel for evaluation of her adrenal mass. On further questioning, She had noticed, over the past 8-9 months, large amount of facial hair, irregular periods and some facial swelling.She denies any palpitations, headaches, or sweats.     Then she was referred Dr. Mansfield for possible surgical resection. The plan was to proceed with surgery given that the tumor mass was too larg to be observed.     She underwent right adrenalectomy on 18. The surgical pathology ([id number]) was low grade adrenal cortical carcinoma of a mass sized 11.3 cm. The tumor shows necrosis and invasion into the adrenal capsule. The margins were involved by the  tumor.The tumor was less than 25% clear cells and a mitotic rate of 15 per 50 HPF. The Ki-67 mitotic rate was 20%. [id number]     For staging workup, she underwent MRI of the brain on 7/22/18 showed no evidence of brain metastases. Also, she had PET scan on 7/23/18 showed no suspicious metastatic lesions.    SUBJECTIVE:   The patient returns today after seeking second opinion regarding the use of adjuvant radiation therapy. In the interval, the patient has continued on adjuvant mitotane. She is overall tolerating well with exception of mild nausea controlled by anti-emetics. She visited with the [facility] were recommendation was to proceed with adjuvant radiation therapy given R2 resection. She presents today to re-discuss the role of radiation therapy and undergo scheduled CT simulation.    PHYSICAL EXAM:  /81  Pulse 102  Resp 18  Wt 72.5 kg (159 lb 12.8 oz)  SpO2 99%  BMI 27.43 kg/m2  BMI: Body mass index is 27.25 kg/(m^2).  GENERAL: Appears well, in no acute distress.   CHEST: Cear bilateral breath sounds without wheezes or crackles.  ABDOMEN:  Clean surgical scar, no redness or edema. Bilateral abdominal striae. Soft and non tender without mass.  LABS AND IMAGING:  Reviewed.    IMPRESSION:   Ms. Rodríguez is a 32 year old female with a  T2N0M0 adrenocortical carcinoma, s/p RT radical adrenalectomy, positive surgical margin. She has no evidence of regional or metastatic disease on post-operative imaging. The patient has started adjuvant mitotane. She presents to re-discuss the role of adjuvant radiation therapy after seeking second opinion at [facility].    PLAN:   1. Given R2 resection and likely presence of residual microscopic tumor cells, we believe it would be very reasonable to offer the patient adjuvant radiation therapy to the post-operative bed and adjacent regional para-aortic lymphatic chain.   This is in accordance with second opinion  obtained from the [facility] as well.     2.  We again had an extensive discussion with the patient about the adjuvant treatment options for her disease. We explained that the curative treatment for the adrenal tumors is surgery. However, the reported recurrence rate is 30-40%, and potentially higher with + margins and larger tumors > 10 cm. The role of adjuvant radiation in such rare tumors in still controversial and the available data is debatable. However, given her young age, large tumor size, capsular invasion and positive surgical margin status, we recommend adjuvant radiotherapy for more local control of her disease with no impact on the survival.     3.  We explained the indication, logistics and the acute and late related side effects associated with the radiotherapy. We re-discussed potential side effects of treatment including injury to the right kidney, the liver, the intestine, the bowel and the ovaries with resulting premature ovarian failure.     4. The patient wishes to pursue adjuvant radiation therapy. Consent obtained today. CT simulation was obtained today with 4D-CT. Will plan to tentatively treat post-op bed to 54 Gy and regional lymphatic chain to 45Gy.     Cesar Monsivais M.D.  Department of Radiation Oncology  [facility]       Cesar Monsivais MD

## 2018-08-17 NOTE — NURSING NOTE
Patient present to [facility] at [facility] after consultation at [facility] as well as second opinions at [place] and [facility]. Plan for today to review radiation therapy treatment plan, complete CT simulation and calender for treatment.  This RN review support services for patient, family and children - offered handouts and reviewed appointment with Dr. Christiano Marcelino.    VITALS  /81  Pulse 102  Resp 18  Wt 72.5 kg (159 lb 12.8 oz)  SpO2 99%  BMI 27.43 kg/m2    PACEMAKER/IMPLANTED CARDIAC DEVICE no    Previous Radiation: No    PAIN  Current history of pain associated with this visit: having abdominal pain/diahrrea related to medication, managing reasonably well  Intensity: Patient is unable to quantify.  Current: none at present  Location: abdomen/stomach/GI  Treatment: on pain medication for surgical/cancer pain, on nausea med and imodium for SE from mitotane    PSYCHOSOCIAL  Marital Status:   Patient lives in [place] with  and 2 sons 7 and 10.  Number of children: 2  Working status: recently quit work. Home full time.  Do you feel safe in your home? Yes    REVIEW OF SYSTEMS  Skin: surgical site appears well healed  Eyes: negative  Ears/Nose/Throat: negative  Respiratory: Dyspnea on exertion- less energy, endurance  Cardiovascular: negative  Gastrointestinal: abdominal pain and diarrhea - since starting mitotane  Genitourinary: negative  Musculoskeletal: negative  Neurologic: negative  Psychiatric: stress/depression - plan to meet with cancer care psychologist  Hematologic/Lymphatic/Immunologic: fatigue  Endocrine:     WOMEN ONLY  Any chance you may be pregnant: No, tubal ligation.  Age at first period: 11  Date of last period:   Number of pregnancies: 2      Radiation Oncology Patient Teaching    Current Concern: what to expect during course of radiation and when will SE show up    Person involved with teaching: Patient  Patient asked  Questions: Yes  Patient was cooperative: Yes  Patient was receptive (willing to accept information given): Yes    Education Assessment  Comprehension ability: Medium  Knowledge level: Medium  Factors affecting teaching: None    Education Materials Given  Radiation Therapy and You  Radiation Therapy to the Abdomen  Diet tips for nausea, diahrrea  Caring for Your Skin During Radiation ...    Educational Topics Discussed  Side effects, Medications, Activity, Nutrition, Adjustment to illness, Community resources and When to call MD/RN    Response To Teaching  More review necessary    Do you have an advanced directive or living will? yes  Are you DNR/DNI? no

## 2018-08-19 NOTE — PROGRESS NOTES
Department of Radiation Oncology  [address] [address]  [phone]       Radiation Oncology Follow-up Visit  2018      Suzy Rodríguez  MRN: [id number]   : 1985       HISTORY OF PRESENT ILLNESS:    Mr. Rodríguez is a 31 yo woman with recent diagnosis of adrenocortical carcinoma. She inially presented to emergency room on 2018 with acute onset left flank pain. Given her Hx of renal stones, she was evaluated by CT abdomen and pelvis on 18 showed 9.2 x 8 cm heterogeneous right upper quadrant mass with small foci of calcification, which is likely arising from the adrenal gland though inseparable from liver and upper pole of right kidney.There was a 6 x 4 x 4 mm upper third left ureteral obstructing stone with mild to moderate secondary hydronephrosis, collection of stones at lower pole left kidney extending over an area of approximately 6 x 7 x  4 mm, a non-obstructing 1 mm stone upper pole left kidney and a 2 mm nonobstructing stone noted upper pole right kidney with no hydronephrosis on the right.      She underwent left nephrolithiasis on 18 by Dr. Delvalle at [facility] urology clinic.Then she was referred to Dr. Patel for evaluation of her adrenal mass. On further questioning, She had noticed, over the past 8-9 months, large amount of facial hair, irregular periods and some facial swelling.She denies any palpitations, headaches, or sweats.     Then she was referred Dr. Mansfield for possible surgical resection. The plan was to proceed with surgery given that the tumor mass was too larg to be observed.     She underwent right adrenalectomy on 18. The surgical pathology ([id number]) was low grade adrenal cortical carcinoma of a mass sized 11.3 cm. The tumor shows necrosis and invasion into the adrenal capsule. The margins were involved by the tumor.The tumor was less than 25% clear cells and a mitotic rate of 15 per  50 HPF. The Ki-67 mitotic rate was 20%. [id number]     For staging workup, she underwent MRI of the brain on 7/22/18 showed no evidence of brain metastases. Also, she had PET scan on 7/23/18 showed no suspicious metastatic lesions.    SUBJECTIVE:   The patient returns today after seeking second opinion regarding the use of adjuvant radiation therapy. In the interval, the patient has continued on adjuvant mitotane. She is overall tolerating well with exception of mild nausea controlled by anti-emetics. She visited with the [facility] were recommendation was to proceed with adjuvant radiation therapy given R2 resection. She presents today to re-discuss the role of radiation therapy and undergo scheduled CT simulation.    PHYSICAL EXAM:  /81  Pulse 102  Resp 18  Wt 72.5 kg (159 lb 12.8 oz)  SpO2 99%  BMI 27.43 kg/m2  BMI: Body mass index is 27.25 kg/(m^2).  GENERAL: Appears well, in no acute distress.   CHEST: Cear bilateral breath sounds without wheezes or crackles.  ABDOMEN:  Clean surgical scar, no redness or edema. Bilateral abdominal striae. Soft and non tender without mass.  LABS AND IMAGING:  Reviewed.    IMPRESSION:   Ms. Rodríguez is a 32 year old female with a  T2N0M0 adrenocortical carcinoma, s/p RT radical adrenalectomy, positive surgical margin. She has no evidence of regional or metastatic disease on post-operative imaging. The patient has started adjuvant mitotane. She presents to re-discuss the role of adjuvant radiation therapy after seeking second opinion at [facility].    PLAN:   1. Given R2 resection and likely presence of residual microscopic tumor cells, we believe it would be very reasonable to offer the patient adjuvant radiation therapy to the post-operative bed and adjacent regional para-aortic lymphatic chain.   This is in accordance with second opinion obtained from the [facility] as well.     2.  We again had an  extensive discussion with the patient about the adjuvant treatment options for her disease. We explained that the curative treatment for the adrenal tumors is surgery. However, the reported recurrence rate is 30-40%, and potentially higher with + margins and larger tumors > 10 cm. The role of adjuvant radiation in such rare tumors in still controversial and the available data is debatable. However, given her young age, large tumor size, capsular invasion and positive surgical margin status, we recommend adjuvant radiotherapy for more local control of her disease with no impact on the survival.     3.  We explained the indication, logistics and the acute and late related side effects associated with the radiotherapy. We re-discussed potential side effects of treatment including injury to the right kidney, the liver, the intestine, the bowel and the ovaries with resulting premature ovarian failure.     4. The patient wishes to pursue adjuvant radiation therapy. Consent obtained today. CT simulation was obtained today with 4D-CT. Will plan to tentatively treat post-op bed to 54 Gy and regional lymphatic chain to 45Gy.     Cesar Monsivais M.D.  Department of Radiation Oncology  [facility]

## 2018-08-20 ENCOUNTER — COMMUNICATION - HEALTHEAST (OUTPATIENT)
Dept: FAMILY MEDICINE | Facility: CLINIC | Age: 33
End: 2018-08-20

## 2018-08-20 ENCOUNTER — RECORDS - HEALTHEAST (OUTPATIENT)
Dept: ADMINISTRATIVE | Facility: OTHER | Age: 33
End: 2018-08-20

## 2018-08-20 ENCOUNTER — APPOINTMENT (OUTPATIENT)
Dept: LAB | Facility: CLINIC | Age: 33
End: 2018-08-20
Attending: INTERNAL MEDICINE
Payer: COMMERCIAL

## 2018-08-20 ENCOUNTER — ONCOLOGY VISIT (OUTPATIENT)
Dept: ONCOLOGY | Facility: CLINIC | Age: 33
End: 2018-08-20
Attending: INTERNAL MEDICINE
Payer: COMMERCIAL

## 2018-08-20 VITALS
HEIGHT: 64 IN | RESPIRATION RATE: 18 BRPM | TEMPERATURE: 98.2 F | HEART RATE: 79 BPM | BODY MASS INDEX: 27.5 KG/M2 | OXYGEN SATURATION: 99 % | SYSTOLIC BLOOD PRESSURE: 131 MMHG | WEIGHT: 161.1 LBS | DIASTOLIC BLOOD PRESSURE: 82 MMHG

## 2018-08-20 DIAGNOSIS — C74.01 ADRENAL CORTICAL ADENOCARCINOMA OF RIGHT ADRENAL GLAND (H): Primary | ICD-10-CM

## 2018-08-20 DIAGNOSIS — C74.01 ADRENAL CORTEX CANCER, RIGHT (H): ICD-10-CM

## 2018-08-20 LAB
ALBUMIN SERPL-MCNC: 3.8 G/DL (ref 3.4–5)
ALP SERPL-CCNC: 114 U/L (ref 40–150)
ALT SERPL W P-5'-P-CCNC: 26 U/L (ref 0–50)
ANION GAP SERPL CALCULATED.3IONS-SCNC: 6 MMOL/L (ref 3–14)
AST SERPL W P-5'-P-CCNC: 16 U/L (ref 0–45)
BASOPHILS # BLD AUTO: 0.1 10E9/L (ref 0–0.2)
BASOPHILS NFR BLD AUTO: 0.7 %
BILIRUB SERPL-MCNC: 0.3 MG/DL (ref 0.2–1.3)
BUN SERPL-MCNC: 4 MG/DL (ref 7–30)
CALCIUM SERPL-MCNC: 8.9 MG/DL (ref 8.5–10.1)
CHLORIDE SERPL-SCNC: 102 MMOL/L (ref 94–109)
CO2 SERPL-SCNC: 29 MMOL/L (ref 20–32)
CREAT SERPL-MCNC: 0.59 MG/DL (ref 0.52–1.04)
DIFFERENTIAL METHOD BLD: ABNORMAL
EOSINOPHIL # BLD AUTO: 0 10E9/L (ref 0–0.7)
EOSINOPHIL NFR BLD AUTO: 0.3 %
ERYTHROCYTE [DISTWIDTH] IN BLOOD BY AUTOMATED COUNT: 16.4 % (ref 10–15)
GFR SERPL CREATININE-BSD FRML MDRD: >90 ML/MIN/1.7M2
GLUCOSE SERPL-MCNC: 89 MG/DL (ref 70–99)
HCT VFR BLD AUTO: 35.9 % (ref 35–47)
HGB BLD-MCNC: 9.9 G/DL (ref 11.7–15.7)
IMM GRANULOCYTES # BLD: 0 10E9/L (ref 0–0.4)
IMM GRANULOCYTES NFR BLD: 0.5 %
LYMPHOCYTES # BLD AUTO: 1.5 10E9/L (ref 0.8–5.3)
LYMPHOCYTES NFR BLD AUTO: 20 %
MCH RBC QN AUTO: 21.2 PG (ref 26.5–33)
MCHC RBC AUTO-ENTMCNC: 27.6 G/DL (ref 31.5–36.5)
MCV RBC AUTO: 77 FL (ref 78–100)
MISCELLANEOUS TEST: NORMAL
MONOCYTES # BLD AUTO: 0.3 10E9/L (ref 0–1.3)
MONOCYTES NFR BLD AUTO: 3.4 %
NEUTROPHILS # BLD AUTO: 5.6 10E9/L (ref 1.6–8.3)
NEUTROPHILS NFR BLD AUTO: 75.1 %
NRBC # BLD AUTO: 0 10*3/UL
NRBC BLD AUTO-RTO: 0 /100
PLATELET # BLD AUTO: 375 10E9/L (ref 150–450)
POTASSIUM SERPL-SCNC: 4 MMOL/L (ref 3.4–5.3)
PROT SERPL-MCNC: 7.2 G/DL (ref 6.8–8.8)
RBC # BLD AUTO: 4.68 10E12/L (ref 3.8–5.2)
SODIUM SERPL-SCNC: 138 MMOL/L (ref 133–144)
WBC # BLD AUTO: 7.4 10E9/L (ref 4–11)

## 2018-08-20 PROCEDURE — 82627 DEHYDROEPIANDROSTERONE: CPT | Performed by: INTERNAL MEDICINE

## 2018-08-20 PROCEDURE — 85025 COMPLETE CBC W/AUTO DIFF WBC: CPT | Performed by: INTERNAL MEDICINE

## 2018-08-20 PROCEDURE — 80375 DRUG/SUBSTANCE NOS 1-3: CPT | Performed by: STUDENT IN AN ORGANIZED HEALTH CARE EDUCATION/TRAINING PROGRAM

## 2018-08-20 PROCEDURE — 36415 COLL VENOUS BLD VENIPUNCTURE: CPT

## 2018-08-20 PROCEDURE — G0463 HOSPITAL OUTPT CLINIC VISIT: HCPCS | Mod: ZF

## 2018-08-20 PROCEDURE — 84999 UNLISTED CHEMISTRY PROCEDURE: CPT | Performed by: STUDENT IN AN ORGANIZED HEALTH CARE EDUCATION/TRAINING PROGRAM

## 2018-08-20 PROCEDURE — 80053 COMPREHEN METABOLIC PANEL: CPT | Performed by: INTERNAL MEDICINE

## 2018-08-20 PROCEDURE — 99215 OFFICE O/P EST HI 40 MIN: CPT | Mod: ZP | Performed by: INTERNAL MEDICINE

## 2018-08-20 RX ORDER — DIPHENHYDRAMINE HYDROCHLORIDE 50 MG/ML
50 INJECTION INTRAMUSCULAR; INTRAVENOUS
Status: CANCELLED
Start: 2018-09-19

## 2018-08-20 RX ORDER — MEPERIDINE HYDROCHLORIDE 25 MG/ML
25 INJECTION INTRAMUSCULAR; INTRAVENOUS; SUBCUTANEOUS EVERY 30 MIN PRN
Status: CANCELLED | OUTPATIENT
Start: 2018-09-19

## 2018-08-20 RX ORDER — ALBUTEROL SULFATE 90 UG/1
1-2 AEROSOL, METERED RESPIRATORY (INHALATION)
Status: CANCELLED
Start: 2018-09-19

## 2018-08-20 RX ORDER — SODIUM CHLORIDE 9 MG/ML
1000 INJECTION, SOLUTION INTRAVENOUS CONTINUOUS PRN
Status: CANCELLED
Start: 2018-09-19

## 2018-08-20 RX ORDER — EPINEPHRINE 0.3 MG/.3ML
0.3 INJECTION SUBCUTANEOUS EVERY 5 MIN PRN
Status: CANCELLED | OUTPATIENT
Start: 2018-09-19

## 2018-08-20 RX ORDER — MULTIVITAMIN
1 CAPSULE ORAL
COMMUNITY
End: 2018-09-19

## 2018-08-20 RX ORDER — ALBUTEROL SULFATE 0.83 MG/ML
2.5 SOLUTION RESPIRATORY (INHALATION)
Status: CANCELLED | OUTPATIENT
Start: 2018-09-19

## 2018-08-20 RX ORDER — FERROUS SULFATE 325(65) MG
325 TABLET ORAL DAILY
COMMUNITY
End: 2020-01-22 | Stop reason: ALTCHOICE

## 2018-08-20 RX ORDER — GABAPENTIN 100 MG/1
400 CAPSULE ORAL DAILY
COMMUNITY
Start: 2018-08-10 | End: 2019-01-08

## 2018-08-20 RX ORDER — EPINEPHRINE 1 MG/ML
0.3 INJECTION, SOLUTION INTRAMUSCULAR; SUBCUTANEOUS EVERY 5 MIN PRN
Status: CANCELLED | OUTPATIENT
Start: 2018-09-19

## 2018-08-20 RX ORDER — ONDANSETRON 4 MG/1
1 TABLET, ORALLY DISINTEGRATING ORAL
COMMUNITY
Start: 2018-05-08 | End: 2018-08-31

## 2018-08-20 RX ORDER — METHYLPREDNISOLONE SODIUM SUCCINATE 125 MG/2ML
125 INJECTION, POWDER, LYOPHILIZED, FOR SOLUTION INTRAMUSCULAR; INTRAVENOUS
Status: CANCELLED
Start: 2018-09-19

## 2018-08-20 ASSESSMENT — PAIN SCALES - GENERAL: PAINLEVEL: MODERATE PAIN (4)

## 2018-08-20 NOTE — PROGRESS NOTES
ATTENDING ATTESTATION NOTE  I, Benson Cintron, personally examined and evaluated this patient. I personally reviewed vital signs, medications, labs and imaging. I discussed the patient with the fellow, Dr. Friedman, and agree with the assessment and plan of care as documented in this progress note from today.     Tate findings: Ms. 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   Since the last visit, the patient has seen radiation oncology at [facility], radiation oncology at [facility], as well as endocrine oncology (Dr. Huertas) at [facility].  Finally, the plan is to give her adjuvant radiation therapy at the [facility] in the coming days.      She has had some difficulty tolerating mitotane and currently she's taking 500 milligrams 3 times a day.  Main side effects are mood changes, nausea, and insomnia.  Her maintenance steroids were also increased to hydrocortisone 30 mg a.m. and 20 mg p.m. by her endocrinologist.  We also discussed the results of PET CT scan from 7/23/2018 as well as chest x-ray from early August, both of which are reassuring and show no evidence of disease progression.    I agree that adjuvant radiation therapy is a reasonable option but she should continue adjuvant mitotane regardless.  We will make a couple of adjustments based on the patient's side effect profile.  Chiefly, I will increase the dose of mitotane by 33% to 1 gram at 7 AM and 1 gram at 3 PM with no night-time dose.  This should be fine because mitotane has a long half life and the patient has significant nighttime side effects which could be attributed to mitotane. Will check tumor markers, basic labs and mitotane levels today and then once a month.  Target mitotane level is 15-20. She will see Dr. Huertas every 3-6 months.    She will also increase the gabapentin to 300 mg  at bedtime with further management by her primary care provider.  She wanted a prescription for cannabis oil, and I discussed the problems with polypharmacy including using oxycodone, benzodiazepines, and cannabis together. I have asked her to stop oxycodone as well as Ativan.    Return to see me in 4 weeks.  All questions answered and patient agreement with this plan.  Benson Cintron  Oncology Attending  DATE OF SERVICE (when I saw the patient): 08/20/18

## 2018-08-20 NOTE — PROGRESS NOTES
"MEDICAL ONCOLOGY RETURN PATIENT CLINIC NOTE    ENCOUNTER DATE: 08/20/18    REFERRING PROVIDER: Warren Mansfield MD from [facility] Urologic Oncology clinic.     REASON FOR CURRENT VISIT: Follow-up for adjuvant therapy of adrenocortical carcinoma.      ONCOLOGIC HISTORY:   1. 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   - CT abdomen and pelvis stone protocol without contrast 5 [id number] showed \"9.2 x 8 cm heterogeneous right upper quadrant mass with small foci of calcification within it which is likely arising from the adrenal gland though inseparable from liver and upper pole of right kidney. It is incompletely evaluated on this noncontrast study. 3 x 2.6 cm mass right lobe of liver on image #85 also incompletely evaluated. 6 x 4 x 4 mm upper third left ureteral obstructing stone with mild to moderate secondary hydronephrosis. Collection of stones at lower pole left kidney extending over an area of approximately 6 x 7 x 4 mm. Additional nonobstructing 1 mm stone upper pole left kidney. 2 mm nonobstructing stone noted upper pole right kidney with no hydronephrosis on the right. Postop change consistent with gastric bypass. Noncontrast images of spleen, left adrenal gland, gallbladder, and pancreas unremarkable. No aneurysm. No adenopathy.\"  - Seen by Dr. Delvalle at [facility] clinic. Referred to Dr. Alvino Patel and then Dr. Warren Mansfield.  - Endocrinology team directed workup showed high DHEAS level of 740 pre-surgery.   - Right open adrenalectomy and hepatic mobilization performed by Dr. Warren Mansfield on 6/25/2018. Pathology showed adrenocortical carcinoma measuring 11.3 cm, weighing 413 g with extension into or through the adrenal capsule negative lymphovascular invasion, tumor necrosis present, margins involved by tumor, no " regional lymph nodes identified, pathologic stage T2 NX.  - DHEAS normalized postsurgery.     INTERVAL HISTORY:   Feeling very fatigued recently.  Denies fevers, chills, night sweats.  Has had marked difficulty falling asleep despite trial of melatonin and sleep hygiene modifications.  OFten has to nap during the day due to fatigue.  Notes increased nausea with increasing mitotane dose.  She has been taking lorazepam at night as needed when unable to sleep.  No vomiting, diarrhea, or weight loss.  Eating and drinking OK, but didn't have much of an appetite.  Having regular BMs.  Notes increasing restless legs at night, which have improved with gabapentin.  Concerned today about getting IV access as she is a difficult stick often requiring 4-5 attempts at blood draws or IV starts.  Notes diffuse pain and achyness and is interested in medical cannabis card referral.  Evaluated at [facility] by Dr. Huertas, and reported agreement with current therapy plan.    REVIEW OF SYSTEMS: 14 point ROS negative other than the symptoms noted above in the HPI.    PAST MEDICAL AND SURGICAL HISTORY: Reviewed today  1. Right-sided adrenocortical carcinoma.  2. MHTFR mutation with h.o mutiple miscarriages.  3. Ovarian cysts diagnosed in 2011.  4. H/o gastric bypass in 2016 for obesity.    SOCIAL HISTORY:   Social History   Substance Use Topics     Smoking status: Never Smoker     Smokeless tobacco: Never Used     Alcohol use Yes      Comment: occ.     FAMILY HISTORY:   Reviewed and non-contributory to current admission.      ALLERGIES:   Allergies   Allergen Reactions     Bee Venom Swelling     Ibuprofen Other (See Comments), Hives and Swelling     CURRENT MEDICATIONS:   Current Outpatient Prescriptions:      acetaminophen (TYLENOL) 325 MG tablet, Take 2 tablets (650 mg) by mouth every 4 hours as needed for other (multimodal surgical pain management along with NSAIDS and opioid medication as indicated based on pain control and physical  function.), Disp: 150 tablet, Rfl: 0     buPROPion (WELLBUTRIN) 100 MG tablet, TAKE 1 TABLET (100 MG TOTAL) BY MOUTH 2 (TWO) TIMES A DAY., Disp: , Rfl: 3     calcium carbonate antacid 1000 MG CHEW, Take 1 tablet by mouth every morning , Disp: , Rfl:      cholecalciferol (VITAMIN D-1000 MAX ST) 1000 units TABS, Take 2,000 Units by mouth every morning , Disp: , Rfl:      enoxaparin (LOVENOX) 40 MG/0.4ML injection, Inject 0.4 mLs (40 mg) Subcutaneous daily, Disp: 14 Syringe, Rfl: 0     EPINEPHrine (EPIPEN/ADRENACLICK/OR ANY BX GENERIC EQUIV) 0.3 MG/0.3ML injection 2-pack, As directed for bee stings, Disp: , Rfl:      ferrous sulfate (IRON) 325 (65 Fe) MG tablet, Take 325 mg by mouth daily, Disp: , Rfl:      FLUoxetine (PROZAC) 20 MG capsule, Take 20 mg by mouth every morning , Disp: , Rfl:      gabapentin (NEURONTIN) 100 MG capsule, Take 100 mg by mouth, Disp: , Rfl:      hydrocortisone (CORTEF) 10 MG tablet, Take 3 pills (=30 mg) in AM and 2 pills (=20 mg) at 2 pm daily., Disp: 150 tablet, Rfl: 11     HYDROCORTISONE EX, Inject 100 mg into the muscle as needed, Disp: , Rfl:      Lidocaine (LIDOCARE) 4 % Patch, Place 2 patches onto the skin every 24 hours, Disp: 14 patch, Rfl: 0     LORazepam (ATIVAN) 0.5 MG tablet, Take 1 tablet (0.5 mg) by mouth every 8 hours as needed for anxiety, Disp: 30 tablet, Rfl: 0     mitotane (LYSODREN) 500 MG tablet CHEMO, Start at 500mg twice daily, then increase to 500mg TID then 500mg QID. Ultimate goal is to take 1gm QID., Disp: 120 tablet, Rfl: 0     mitotane (LYSODREN) 500 MG tablet CHEMO, Start at 500mg twice daily, then increase to 500mg TID then 500mg QID. Ultimate goal is to take 1gm QID., Disp: 120 tablet, Rfl: 0     Multiple Vitamin (MULTIVITAMIN  S) CAPS, Take 1 capsule by mouth, Disp: , Rfl:      Multiple Vitamins-Calcium (ONE-A-DAY WOMENS PO), Take 2 tablets by mouth every morning , Disp: , Rfl:      ondansetron (ZOFRAN) 8 MG tablet, Take 1 tablet (8 mg) by mouth every 8 hours  "as needed for nausea, Disp: 30 tablet, Rfl: 0     ondansetron (ZOFRAN-ODT) 4 MG ODT tab, 1 tablet, Disp: , Rfl:      hydrocortisone sodium succinate PF (SOLU-CORTEF) 100 MG injection, Inject 2 mLs (100 mg) into the muscle once for 1 dose Use in emergency situations as discussed in clinic., Disp: 2 mL, Rfl: 1     oxyCODONE-acetaminophen (PERCOCET) 5-325 MG per tablet, Take 1-2 tablets by mouth every 4 hours as needed for pain (Patient not taking: Reported on 7/25/2018), Disp: 12 tablet, Rfl: 0    PHYSICAL EXAMINATION:  Vital signs: /82 (BP Location: Right arm, Patient Position: Sitting, Cuff Size: Adult Regular)  Pulse 79  Temp 98.2  F (36.8  C) (Oral)  Resp 18  Ht 1.626 m (5' 4.02\")  Wt 73.1 kg (161 lb 1.6 oz)  SpO2 99%  BMI 27.64 kg/m2  ECOG performance status of 0.   GENERAL: NAD, WDWN  HEENT: No icterus, no pallor. MMM, PERRL, OP clear.   NECK: Supple, no JVD/LAD.  LUNGS: CTAB, normal WOB on RA   CARDIOVASCULAR: Regular rate and rhythm, no murmurs, gallops or rubs, normal S1/S2.   ABDOMEN: Well-healing abdominal incision without fluctuance or exudate. Soft, NT, ND, no masses appreciated, normal BS.   EXTREMITIES: No cyanosis, no clubbing, no edema.   NEUROLOGIC: No focal deficits, CN 2-12 intact. Linear thought process.    LYMPH NODE EXAM: No palpable adenopathy - cervical, supraclavicular.      LABORATORY DATA:  CBC RESULTS:   Recent Labs   Lab Test  07/14/18   1508   WBC  7.6   RBC  3.20*   HGB  8.3*   HCT  28.3*   MCV  88   MCH  25.9*   MCHC  29.3*   RDW  14.9   PLT  464*       Recent Labs   Lab Test  07/14/18   1508  07/01/18   0724   NA  136  140   POTASSIUM  3.5  3.7   CHLORIDE  100  105   CO2  28  28   ANIONGAP  8  6   GLC  103*  74   BUN  6*  6*   CR  0.80  0.54   SHASHA  8.7  8.2*     Liver Function Studies -   Recent Labs   Lab Test  07/14/18   1508   PROTTOTAL  7.1   ALBUMIN  3.4   BILITOTAL  0.5   ALKPHOS  141   AST  25   ALT  39     IMAGING STUDIES:  PET CT 7/23/18  \"In this patient with " "right sided adrenocortical carcinoma,  Status post adrenalectomy;  1. Residual fluid collection at the vicinity of the adrenal gland with  mild peripheral FDG uptake. This may represent postsurgical fluid  collection.   2. Increased right pleural effusion since 6/29/2018. There is a  plaque-like pleural thickening with mild elevated FDG uptake. This  finding is indeterminant, while its favored to represent an  infectious/inflammatory condition, metastasis cannot be clearly  excluded. Attention on follow up.  3. Non-FDG avid 2.5 left complex adnexal cyst.\"    MR brain 7/22/18  \"No evidence to suggest metastatic disease to the brain.\"    ASSESSMENT AND PLAN: A 32-year-old lady with recently diagnosed right-sided functioning adrenocortical carcinoma, who is here for follow-up.   - I reviewed the available diagnostic data at length with patient and her . Explained that adrenal cortical tumors are rare but given her presentation and tumor characteristics including invasion of the adrenal capsule, high mitotic rate, possibly positive margins, and high Ki67, her rate of recurrence can be as high as 40%.  She did seek second opinion at [facility] with reported agreement to the above plan. Plan to see him every 3-6 months for follow-up, but primary management will be at [facility] with Dr. Cintron.    - Change mitotane dosing to 1g at 7AM and 3PM due to increased nausea and insomnia thought to be due to mitotane side effects.    - Recommend scheduled zofran/antiemetics.   - Increase gabapentin to 300mg at bedtime, with PCP   - Will send mitotane level out today and then monthly.  ([facility] lab #[id number] at [place]).    - Monthly evaluation with labs including DHEAS while on therapy.   - Regarding referral for medical cannabis oil, it was discussed today the dangers of polypharmacy, particularly with narcotics and benzos.      Return to see Dr. Cintron in 4 weeks with labs.      Patient and family given " opportunity to ask questions, which were answered to their satisfaction. They're in complete agreement as planned.    Patient seen and discussed with Dr. Cintron.     Maico Friedman MD, PhD  Hematology/Oncology Fellow  Pager: [phone]  5:14 PM  August 20, 2018

## 2018-08-20 NOTE — NURSING NOTE
Chief Complaint   Patient presents with     Blood Draw     No lab orders. VS taken by LINDA.      Camila Carr, LINDA

## 2018-08-20 NOTE — LETTER
"8/20/2018       RE: Suzy Rodríguez  [address]  [facility] [address]     Dear Colleague,    Thank you for referring your patient, Suzy Rodríguez, to the [facility]. Please see a copy of my visit note below.    MEDICAL ONCOLOGY RETURN PATIENT CLINIC NOTE    ENCOUNTER DATE: 08/20/18    REFERRING PROVIDER: Warren Mansfield MD from [facility] Urologic Oncology clinic.     REASON FOR CURRENT VISIT: Follow-up for adjuvant therapy of adrenocortical carcinoma.      ONCOLOGIC HISTORY:   1. 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   - CT abdomen and pelvis stone protocol without contrast 5 8548 showed \"9.2 x 8 cm heterogeneous right upper quadrant mass with small foci of calcification within it which is likely arising from the adrenal gland though inseparable from liver and upper pole of right kidney. It is incompletely evaluated on this noncontrast study. 3 x 2.6 cm mass right lobe of liver on image #85 also incompletely evaluated. 6 x 4 x 4 mm upper third left ureteral obstructing stone with mild to moderate secondary hydronephrosis. Collection of stones at lower pole left kidney extending over an area of approximately 6 x 7 x 4 mm. Additional nonobstructing 1 mm stone upper pole left kidney. 2 mm nonobstructing stone noted upper pole right kidney with no hydronephrosis on the right. Postop change consistent with gastric bypass. Noncontrast images of spleen, left adrenal gland, gallbladder, and pancreas unremarkable. No aneurysm. No adenopathy.\"  - Seen by Dr. Delvalle at [facility] Urology clinic. Referred to Dr. Alvino Patel and then Dr. Warren Mansfield.  - Endocrinology team directed workup showed high DHEAS level of 740 pre-surgery.   - Right open adrenalectomy and hepatic mobilization performed by Dr. Kelley " Weight on 6/25/2018. Pathology showed adrenocortical carcinoma measuring 11.3 cm, weighing 413 g with extension into or through the adrenal capsule negative lymphovascular invasion, tumor necrosis present, margins involved by tumor, no regional lymph nodes identified, pathologic stage T2 NX.  - DHEAS normalized postsurgery.     INTERVAL HISTORY:   Feeling very fatigued recently.  Denies fevers, chills, night sweats.  Has had marked difficulty falling asleep despite trial of melatonin and sleep hygiene modifications.  OFten has to nap during the day due to fatigue.  Notes increased nausea with increasing mitotane dose.  She has been taking lorazepam at night as needed when unable to sleep.  No vomiting, diarrhea, or weight loss.  Eating and drinking OK, but didn't have much of an appetite.  Having regular BMs.  Notes increasing restless legs at night, which have improved with gabapentin.  Concerned today about getting IV access as she is a difficult stick often requiring 4-5 attempts at blood draws or IV starts.  Notes diffuse pain and achyness and is interested in medical cannabis card referral.  Evaluated at [facility] by Dr. Huertas, and reported agreement with current therapy plan.    REVIEW OF SYSTEMS: 14 point ROS negative other than the symptoms noted above in the HPI.    PAST MEDICAL AND SURGICAL HISTORY: Reviewed today  1. Right-sided adrenocortical carcinoma.  2. MHTFR mutation with h.o mutiple miscarriages.  3. Ovarian cysts diagnosed in 2011.  4. H/o gastric bypass in 2016 for obesity.    SOCIAL HISTORY:   Social History   Substance Use Topics     Smoking status: Never Smoker     Smokeless tobacco: Never Used     Alcohol use Yes      Comment: occ.     FAMILY HISTORY:   Reviewed and non-contributory to current admission.      ALLERGIES:   Allergies   Allergen Reactions     Bee Venom Swelling     Ibuprofen Other (See Comments), Hives and Swelling     CURRENT MEDICATIONS:   Current Outpatient Prescriptions:       acetaminophen (TYLENOL) 325 MG tablet, Take 2 tablets (650 mg) by mouth every 4 hours as needed for other (multimodal surgical pain management along with NSAIDS and opioid medication as indicated based on pain control and physical function.), Disp: 150 tablet, Rfl: 0     buPROPion (WELLBUTRIN) 100 MG tablet, TAKE 1 TABLET (100 MG TOTAL) BY MOUTH 2 (TWO) TIMES A DAY., Disp: , Rfl: 3     calcium carbonate antacid 1000 MG CHEW, Take 1 tablet by mouth every morning , Disp: , Rfl:      cholecalciferol (VITAMIN D-1000 MAX ST) 1000 units TABS, Take 2,000 Units by mouth every morning , Disp: , Rfl:      enoxaparin (LOVENOX) 40 MG/0.4ML injection, Inject 0.4 mLs (40 mg) Subcutaneous daily, Disp: 14 Syringe, Rfl: 0     EPINEPHrine (EPIPEN/ADRENACLICK/OR ANY BX GENERIC EQUIV) 0.3 MG/0.3ML injection 2-pack, As directed for bee stings, Disp: , Rfl:      ferrous sulfate (IRON) 325 (65 Fe) MG tablet, Take 325 mg by mouth daily, Disp: , Rfl:      FLUoxetine (PROZAC) 20 MG capsule, Take 20 mg by mouth every morning , Disp: , Rfl:      gabapentin (NEURONTIN) 100 MG capsule, Take 100 mg by mouth, Disp: , Rfl:      hydrocortisone (CORTEF) 10 MG tablet, Take 3 pills (=30 mg) in AM and 2 pills (=20 mg) at 2 pm daily., Disp: 150 tablet, Rfl: 11     HYDROCORTISONE EX, Inject 100 mg into the muscle as needed, Disp: , Rfl:      Lidocaine (LIDOCARE) 4 % Patch, Place 2 patches onto the skin every 24 hours, Disp: 14 patch, Rfl: 0     LORazepam (ATIVAN) 0.5 MG tablet, Take 1 tablet (0.5 mg) by mouth every 8 hours as needed for anxiety, Disp: 30 tablet, Rfl: 0     mitotane (LYSODREN) 500 MG tablet CHEMO, Start at 500mg twice daily, then increase to 500mg TID then 500mg QID. Ultimate goal is to take 1gm QID., Disp: 120 tablet, Rfl: 0     mitotane (LYSODREN) 500 MG tablet CHEMO, Start at 500mg twice daily, then increase to 500mg TID then 500mg QID. Ultimate goal is to take 1gm QID., Disp: 120 tablet, Rfl: 0     Multiple Vitamin (MULTIVITAMIN   "S) CAPS, Take 1 capsule by mouth, Disp: , Rfl:      Multiple Vitamins-Calcium (ONE-A-DAY WOMENS PO), Take 2 tablets by mouth every morning , Disp: , Rfl:      ondansetron (ZOFRAN) 8 MG tablet, Take 1 tablet (8 mg) by mouth every 8 hours as needed for nausea, Disp: 30 tablet, Rfl: 0     ondansetron (ZOFRAN-ODT) 4 MG ODT tab, 1 tablet, Disp: , Rfl:      hydrocortisone sodium succinate PF (SOLU-CORTEF) 100 MG injection, Inject 2 mLs (100 mg) into the muscle once for 1 dose Use in emergency situations as discussed in clinic., Disp: 2 mL, Rfl: 1     oxyCODONE-acetaminophen (PERCOCET) 5-325 MG per tablet, Take 1-2 tablets by mouth every 4 hours as needed for pain (Patient not taking: Reported on 7/25/2018), Disp: 12 tablet, Rfl: 0    PHYSICAL EXAMINATION:  Vital signs: /82 (BP Location: Right arm, Patient Position: Sitting, Cuff Size: Adult Regular)  Pulse 79  Temp 98.2  F (36.8  C) (Oral)  Resp 18  Ht 1.626 m (5' 4.02\")  Wt 73.1 kg (161 lb 1.6 oz)  SpO2 99%  BMI 27.64 kg/m2  ECOG performance status of 0.   GENERAL: NAD, WDWN  HEENT: No icterus, no pallor. MMM, PERRL, OP clear.   NECK: Supple, no JVD/LAD.  LUNGS: CTAB, normal WOB on RA   CARDIOVASCULAR: Regular rate and rhythm, no murmurs, gallops or rubs, normal S1/S2.   ABDOMEN: Well-healing abdominal incision without fluctuance or exudate. Soft, NT, ND, no masses appreciated, normal BS.   EXTREMITIES: No cyanosis, no clubbing, no edema.   NEUROLOGIC: No focal deficits, CN 2-12 intact. Linear thought process.    LYMPH NODE EXAM: No palpable adenopathy - cervical, supraclavicular.      LABORATORY DATA:  CBC RESULTS:   Recent Labs   Lab Test  07/14/18   1508   WBC  7.6   RBC  3.20*   HGB  8.3*   HCT  28.3*   MCV  88   MCH  25.9*   MCHC  29.3*   RDW  14.9   PLT  464*       Recent Labs   Lab Test  07/14/18   1508  07/01/18   0724   NA  136  140   POTASSIUM  3.5  3.7   CHLORIDE  100  105   CO2  28  28   ANIONGAP  8  6   GLC  103*  74   BUN  6*  6*   CR  0.80  " "0.54   SHASHA  8.7  8.2*     Liver Function Studies -   Recent Labs   Lab Test  07/14/18   1508   PROTTOTAL  7.1   ALBUMIN  3.4   BILITOTAL  0.5   ALKPHOS  141   AST  25   ALT  39     IMAGING STUDIES:  PET CT 7/23/18  \"In this patient with right sided adrenocortical carcinoma,  Status post adrenalectomy;  1. Residual fluid collection at the vicinity of the adrenal gland with  mild peripheral FDG uptake. This may represent postsurgical fluid  collection.   2. Increased right pleural effusion since 6/29/2018. There is a  plaque-like pleural thickening with mild elevated FDG uptake. This  finding is indeterminant, while its favored to represent an  infectious/inflammatory condition, metastasis cannot be clearly  excluded. Attention on follow up.  3. Non-FDG avid 2.5 left complex adnexal cyst.\"    MR brain 7/22/18  \"No evidence to suggest metastatic disease to the brain.\"    ASSESSMENT AND PLAN: A 32-year-old lady with recently diagnosed right-sided functioning adrenocortical carcinoma, who is here for follow-up.   - I reviewed the available diagnostic data at length with patient and her . Explained that adrenal cortical tumors are rare but given her presentation and tumor characteristics including invasion of the adrenal capsule, high mitotic rate, possibly positive margins, and high Ki67, her rate of recurrence can be as high as 40%.  She did seek second opinion at [facility] with reported agreement to the above plan. Plan to see him every 3-6 months for follow-up, but primary management will be at [facility] with Dr. Cintron.    - Change mitotane dosing to 1g at 7AM and 3PM due to increased nausea and insomnia thought to be due to mitotane side effects.    - Recommend scheduled zofran/antiemetics.   - Increase gabapentin to 300mg at bedtime, with PCP   - Will send mitotane level out today and then monthly.  ([facility] lab #[id number] at [place]).    - Monthly evaluation with labs including DHEAS while " on therapy.   - Regarding referral for medical cannabis oil, it was discussed today the dangers of polypharmacy, particularly with narcotics and benzos.      Return to see Dr. Cintron in 4 weeks with labs.      Patient and family given opportunity to ask questions, which were answered to their satisfaction. They're in complete agreement as planned.    Patient seen and discussed with Dr. Cintron.     Maico Friedman MD, PhD  Hematology/Oncology Fellow  Pager: [phone]  5:14 PM  August 20, 2018      ATTENDING ATTESTATION NOTE  I, Benson Cintron, personally examined and evaluated this patient. I personally reviewed vital signs, medications, labs and imaging. I discussed the patient with the fellow, Dr. Friedman, and agree with the assessment and plan of care as documented in this progress note from today.     Tate findings: Ms. 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   Since the last visit, the patient has seen radiation oncology at [facility], radiation oncology at [facility], as well as endocrine oncology (Dr. Huertas) at [facility].  Finally, the plan is to give her adjuvant radiation therapy at the [facility] in the coming days.      She has had some difficulty tolerating mitotane and currently she's taking 500 milligrams 3 times a day.  Main side effects are mood changes, nausea, and insomnia.  Her maintenance steroids were also increased to hydrocortisone 30 mg a.m. and 20 mg p.m. by her endocrinologist.  We also discussed the results of PET CT scan from 7/23/2018 as well as chest x-ray from early August, both of which are reassuring and show no evidence of disease progression.    I agree that adjuvant radiation therapy is a reasonable option but she should continue adjuvant mitotane regardless.  We will make a couple of adjustments based on the patient's side effect  profile.  Chiefly, I will increase the dose of mitotane by 33% to 1 gram at 7 AM and 1 gram at 3 PM with no night-time dose.  This should be fine because mitotane has a long half life and the patient has significant nighttime side effects which could be attributed to mitotane. Will check tumor markers, basic labs and mitotane levels today and then once a month.  Target mitotane level is 15-20. She will see Dr. Huertas every 3-6 months.    She will also increase the gabapentin to 300 mg at bedtime with further management by her primary care provider.  She wanted a prescription for cannabis oil, and I discussed the problems with polypharmacy including using oxycodone, benzodiazepines, and cannabis together. I have asked her to stop oxycodone as well as Ativan.    Return to see me in 4 weeks.  All questions answered and patient agreement with this plan.  Benson Cintron  Oncology Attending  DATE OF SERVICE (when I saw the patient): 08/20/18      Again, thank you for allowing me to participate in the care of your patient.      Sincerely,    Benson Cintron MD

## 2018-08-20 NOTE — MR AVS SNAPSHOT
After Visit Summary   8/20/2018    Suzy Rodríguez    MRN: [id number]           Patient Information     Date Of Birth          1985        Visit Information        Provider Department      8/20/2018 12:45 PM Benson Cintron MD [facility]        Today's Diagnoses     Adrenal cortical adenocarcinoma of right adrenal gland (H)    -  1    Adrenal cortex cancer, right (H)           Follow-ups after your visit        Your next 10 appointments already scheduled     Aug 23, 2018  7:20 AM CDT   (Arrive by 7:05 AM)   Return Visit with Warren Mansfield MD   [facility] and [facility] ([facility])    [address] [place] [address]  4th [facility] [id number] [phone]            Aug 27, 2018  8:00 AM CDT   [facility]    [facility]  [address]  [facility] [address]   [phone]            Aug 27, 2018  9:30 AM CDT   Linear Accelerator with Lr [facility] Rad Tech   Radiation Therapy Center ([facility])    [address], [address]  [facility] [address]   [phone]            Aug 28, 2018  9:30 AM CDT   Linear Accelerator with Lr [facility] Rad Tech   Radiation Therapy Center ([facility])    [address]  [facility] [address]   [phone]            Aug 29, 2018  9:45 AM CDT   Linear Accelerator with Lr [facility] Rad Tech   Radiation Therapy Center ([facility])    [address]  [facility] [address]   [phone]            Aug 29, 2018 10:00 AM CDT   ON TREATMENT VISIT with Cesar Monsivais MD   Radiation Therapy Center ([facility])    [address], [address]  [facility] [address]   [phone]            Aug 30, 2018  9:45 AM CDT   Linear Accelerator with Lr [facility] Rad Tech   Radiation Therapy Center ([facility])     [address]  [facility] [address]   [phone]            Aug 31, 2018  9:45 AM CDT   Linear Accelerator with Lr [facility]   Radiation Therapy Center ([facility])    [address]  [place] [address]   [phone]            Sep 04, 2018  9:45 AM CDT   Linear Accelerator with Lr [facility] Rad Tech   Radiation Therapy Center ([facility])    [address]  [place] [address]   [phone]            Sep 05, 2018  9:45 AM CDT   Linear Accelerator with Lr [facility] Rad Tech   Radiation Therapy Center ([facility])    [address]  [facility] [address]   [phone]              Who to contact     If you have questions or need follow up information about today's clinic visit or your schedule please contact [facility] directly at [phone].  Normal or non-critical lab and imaging results will be communicated to you by Motion Mathhart, letter or phone within 4 business days after the clinic has received the results. If you do not hear from us within 7 days, please contact the clinic through Sagebint or phone. If you have a critical or abnormal lab result, we will notify you by phone as soon as possible.  Submit refill requests through Axxana or call your pharmacy and they will forward the refill request to us. Please allow 3 business days for your refill to be completed.          Additional Information About Your Visit        Axxana Information     Axxana gives you secure access to your electronic health record. If you see a primary care provider, you can also send messages to your care team and make appointments. If you have questions, please call your primary care clinic.  If you do not have a primary care provider, please call [phone] and they will assist you.        Care EveryWhere ID     This is your Care EveryWhere ID. This could be used by other organizations to access your [place]  "medical records  [id number]        Your Vitals Were     Pulse Temperature Respirations Height Pulse Oximetry BMI (Body Mass Index)    79 98.2  F (36.8  C) (Oral) 18 1.626 m (5' 4.02\") 99% 27.64 kg/m2       Blood Pressure from Last 3 Encounters:   08/20/18 131/82   08/17/18 118/81   08/07/18 117/76    Weight from Last 3 Encounters:   08/20/18 73.1 kg (161 lb 1.6 oz)   08/17/18 72.5 kg (159 lb 12.8 oz)   07/25/18 72 kg (158 lb 11.7 oz)              We Performed the Following     CBC with platelets differential     Comprehensive metabolic panel     DHEA sulfate     mitotane level (Lab 4909): Laboratory Miscellaneous Order     Send outs misc test          Today's Medication Changes          These changes are accurate as of 8/20/18 11:59 PM.  If you have any questions, ask your nurse or doctor.               Stop taking these medicines if you haven't already. Please contact your care team if you have questions.     diazepam 2 MG tablet   Commonly known as:  VALIUM   Stopped by:  Benson Cintron MD           oxyCODONE IR 10 MG tablet   Commonly known as:  ROXICODONE   Stopped by:  Benson Cintron MD                    Primary Care Provider Office Phone # Fax #    [facility] [phone]       [facility] [facility] [address]        Equal Access to Services     MARQUITA VALADEZ AH: Hadii greg sorto Sobree, waaxda luqadaha, qaybta kaalmada rodo, coleman putnam . So [facility] [phone].    ATENCIÓN: Si habla español, tiene a lyman disposición servicios gratuitos de asistencia lingüística. Llame al [phone].    We comply with applicable federal civil rights laws and [place] laws. We do not discriminate on the basis of race, color, national origin, age, disability, sex, sexual orientation, or gender identity.            Thank you!     Thank you for choosing [facility] CANCER [facility]  for your care. Our goal is always to provide you with excellent care. " Hearing back from our patients is one way we can continue to improve our services. Please take a few minutes to complete the written survey that you may receive in the mail after your visit with us. Thank you!             Your Updated Medication List - Protect others around you: Learn how to safely use, store and throw away your medicines at www.disposemymeds.org.          This list is accurate as of 8/20/18 11:59 PM.  Always use your most recent med list.                   Brand Name Dispense Instructions for use Diagnosis    acetaminophen 325 MG tablet    TYLENOL    150 tablet    Take 2 tablets (650 mg) by mouth every 4 hours as needed for other (multimodal surgical pain management along with NSAIDS and opioid medication as indicated based on pain control and physical function.)    Adrenal mass, right (H), Acute post-operative pain       buPROPion 100 MG tablet    WELLBUTRIN     TAKE 1 TABLET (100 MG TOTAL) BY MOUTH 2 (TWO) TIMES A DAY.        calcium carbonate antacid 1000 MG Chew      Take 1 tablet by mouth every morning        enoxaparin 40 MG/0.4ML injection    LOVENOX    14 Syringe    Inject 0.4 mLs (40 mg) Subcutaneous daily    Adrenal mass, right (H), H/O MTHFR mutation       EPINEPHrine 0.3 MG/0.3ML injection 2-pack    EPIPEN/ADRENACLICK/or ANY BX GENERIC EQUIV     As directed for bee stings        ferrous sulfate 325 (65 Fe) MG tablet    IRON     Take 325 mg by mouth daily        FLUoxetine 20 MG capsule    PROzac     Take 20 mg by mouth every morning        gabapentin 100 MG capsule    NEURONTIN     Take 100 mg by mouth        hydrocortisone 10 MG tablet    CORTEF    150 tablet    Take 3 pills (=30 mg) in AM and 2 pills (=20 mg) at 2 pm daily.        HYDROCORTISONE EX      Inject 100 mg into the muscle as needed        hydrocortisone sodium succinate  MG injection    solu-CORTEF    2 mL    Inject 2 mLs (100 mg) into the muscle once for 1 dose Use in emergency situations as discussed in clinic.     Secondary adrenal insufficiency (H)       Lidocaine 4 % Patch    LIDOCARE    14 patch    Place 2 patches onto the skin every 24 hours    Adrenal mass, right (H), Acute post-operative pain       LORazepam 0.5 MG tablet    ATIVAN    30 tablet    Take 1 tablet (0.5 mg) by mouth every 8 hours as needed for anxiety    Adrenal mass (H), Anxiety       * mitotane 500 MG tablet CHEMO    LYSODREN    120 tablet    Start at 500mg twice daily, then increase to 500mg TID then 500mg QID. Ultimate goal is to take 1gm QID.    Adrenal cortex cancer, right (H)       * mitotane 500 MG tablet CHEMO    LYSODREN    120 tablet    Start at 500mg twice daily, then increase to 500mg TID then 500mg QID. Ultimate goal is to take 1gm QID.    Adrenal cortex cancer, right (H)       MULTIvitamin  S Caps      Take 1 capsule by mouth        ondansetron 4 MG ODT tab    ZOFRAN-ODT     1 tablet        ondansetron 8 MG tablet    ZOFRAN    30 tablet    Take 1 tablet (8 mg) by mouth every 8 hours as needed for nausea    Adrenal cortex cancer, right (H)       ONE-A-DAY WOMENS PO      Take 2 tablets by mouth every morning        oxyCODONE-acetaminophen 5-325 MG per tablet    PERCOCET    12 tablet    Take 1-2 tablets by mouth every 4 hours as needed for pain        VITAMIN D-1000 MAX ST 1000 units Tabs   Generic drug:  cholecalciferol      Take 2,000 Units by mouth every morning        * Notice:  This list has 2 medication(s) that are the same as other medications prescribed for you. Read the directions carefully, and ask your doctor or other care provider to review them with you.

## 2018-08-20 NOTE — NURSING NOTE
"Oncology Rooming Note    August 20, 2018 12:32 PM   Suzy Rodríguez is a 32 year old female who presents for:    Chief Complaint   Patient presents with     Blood Draw     No lab orders. VS taken by RN.      RECHECK     ONC Adrenal Cortical Ca 4 wk f/up     Initial Vitals: /82 (BP Location: Right arm, Patient Position: Sitting, Cuff Size: Adult Regular)  Pulse 79  Temp 98.2  F (36.8  C) (Oral)  Resp 18  Ht 1.626 m (5' 4.02\")  Wt 73.1 kg (161 lb 1.6 oz)  SpO2 99%  BMI 27.64 kg/m2 Estimated body mass index is 27.64 kg/(m^2) as calculated from the following:    Height as of this encounter: 1.626 m (5' 4.02\").    Weight as of this encounter: 73.1 kg (161 lb 1.6 oz). Body surface area is 1.82 meters squared.  Moderate Pain (4) Comment: Data Unavailable   No LMP recorded.  Allergies reviewed: Yes  Medications reviewed: Yes    Medications: Medication refills not needed today.  Pharmacy name entered into [employer]:    [id number] - [place] [id number] [place], [place] - [address]    Clinical concerns: none      6 minutes for nursing intake (face to face time)     [facility]              "

## 2018-08-21 ENCOUNTER — COMMUNICATION - HEALTHEAST (OUTPATIENT)
Dept: FAMILY MEDICINE | Facility: CLINIC | Age: 33
End: 2018-08-21

## 2018-08-22 DIAGNOSIS — C74.00: Primary | ICD-10-CM

## 2018-08-22 LAB — DHEA-S SERPL-MCNC: <15 UG/DL (ref 35–430)

## 2018-08-22 RX ORDER — OXYCODONE HYDROCHLORIDE 10 MG/1
10 TABLET ORAL EVERY 4 HOURS PRN
Qty: 22 TABLET | Refills: 0 | Status: SHIPPED | OUTPATIENT
Start: 2018-08-22 | End: 2019-01-08

## 2018-08-23 ENCOUNTER — OFFICE VISIT (OUTPATIENT)
Dept: UROLOGY | Facility: CLINIC | Age: 33
End: 2018-08-23
Payer: COMMERCIAL

## 2018-08-23 ENCOUNTER — RECORDS - HEALTHEAST (OUTPATIENT)
Dept: ADMINISTRATIVE | Facility: OTHER | Age: 33
End: 2018-08-23

## 2018-08-23 ENCOUNTER — TELEPHONE (OUTPATIENT)
Dept: ENDOCRINOLOGY | Facility: CLINIC | Age: 33
End: 2018-08-23

## 2018-08-23 VITALS
SYSTOLIC BLOOD PRESSURE: 123 MMHG | BODY MASS INDEX: 26.98 KG/M2 | WEIGHT: 158 LBS | DIASTOLIC BLOOD PRESSURE: 83 MMHG | HEIGHT: 64 IN | HEART RATE: 100 BPM

## 2018-08-23 DIAGNOSIS — C74.01 ADRENAL CORTICAL ADENOCARCINOMA OF RIGHT ADRENAL GLAND (H): Primary | ICD-10-CM

## 2018-08-23 ASSESSMENT — PAIN SCALES - GENERAL: PAINLEVEL: NO PAIN (0)

## 2018-08-23 NOTE — PATIENT INSTRUCTIONS
Dr. Mansfield's team will follow up with you per your discussion today.    It was a pleasure meeting with you today.  Thank you for allowing me and my team the privilege of caring for you today.  YOU are the reason we are here, and I truly hope we provided you with the excellent service you deserve.  Please let us know if there is anything else we can do for you so that we can be sure you are leaving completely satisfied with your care experience.      Martin Haney LPN

## 2018-08-23 NOTE — NURSING NOTE
Chief Complaint   Patient presents with     RECHECK     Follow up- adrenal cortical carcinoma       Veena Headley MA

## 2018-08-23 NOTE — PROGRESS NOTES
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  Ki-67 mitotic rate 20%  cM3TpWl (CT chest shows 3 mm nodules)  ***pathology report classifies as pT2     Stats from the national cancer database suggest 5 year survival is around 40%  [place] [employer] Study Group, numbers suggest 24% 5 year survival for stage 3    DHEA pre surgery was 740    Had an elevated DHEA pre-op    Most recent DHEA from 8/20/18 was < 15 so very good response    --------------    Starts radiation on Monday (30 treatments, 6 weeks 5 days a week)     Recommend repeating imaging 4-6 weeks following completion of treatment     Continues on mitotane, planning to wean off in 2-3 years if no recurrence. She reports side effect: pain in lower extremities (restless legs at night, unable to sleep, soreness and joint pain in lower extremities).    Pain in diaphragm area/right lung after 15 mins of brisk walking    Chest XR from 8/7/18 demonstrated no evidence of pleural effusion.        Patient is a 32 year old  female   Vitals: Blood pressure 123/83, pulse 100, height 1.626 m (5' 4\"), weight 71.7 kg (158 lb), not currently breastfeeding.  General Appearance Adult: Alert, no acute distress, oriented  Incisions are healing well.    A/P Adrenal Cortical Carcinoma, with possible positive margin, but no clear intraoperative evidence of a gross margin and the resection was as wide as possible without removing the cava.    Have already discussed at  oncology conf and it was recommended that she visit Medical Oncology and Radiation oncology.    She also has found an endocrinologist in [place] with whom she would like to consult and I encouraged this.    These are extremely rare tumors, and that makes evidence difficult to accumulate.    Will plan to re-image " her in approximately 4 weeks.    Scribe Disclosure:   I,Camacho Nance, am serving as a scribe; to document services personally performed by Warren Mansfield MD based on data collection and the provider's statements to me.     Warren Mansfield MD  Department of Urology Staff  [facility]    Note, dictation software may have been used for this note and the content was not proofread for accuracy

## 2018-08-23 NOTE — MR AVS SNAPSHOT
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 Mansfield's team will follow up with you per your discussion today.    It was a pleasure meeting with you today.  Thank you for allowing me and my team the privilege of caring for you today.  YOU are the reason we are here, and I truly hope we provided you with the excellent service you deserve.  Please let us know if there is anything else we can do for you so that we can be sure you are leaving completely satisfied with your care experience.      Martin Haney LPN          Follow-ups after your visit        Your next 10 appointments already scheduled     Sep 04, 2018  9:45 AM CDT   Linear Accelerator with Lr [facility]   Radiation Therapy Center ([facility])    [address], [address]  [place] [facility] [place] [address]   [phone]            Sep 05, 2018  9:45 AM CDT   Linear Accelerator with Lr [facility]   Radiation Therapy Center ([facility])    [address]  [place] [facility] [place] [address]   [phone]            Sep 05, 2018 10:00 AM CDT   ON TREATMENT VISIT with Cesar Monsivais MD   Radiation Therapy Center ([facility])    [address]  [place] [place] [address]   [phone]            Sep 06, 2018  9:45 AM CDT   Linear Accelerator with Lr [facility] Rad [facility]   Radiation Therapy Center ([facility])    [address]  [place] [place] [address]   [phone]            Sep 07, 2018  9:45 AM CDT   Linear Accelerator with Lr [facility]   Radiation Therapy Center ([facility])    [address]  [facility] [address]   [phone]            Sep 10, 2018  9:45 AM CDT   Linear Accelerator with Lr [facility]   Radiation Therapy Center ([facility])    [address]  [place] [address]   [phone]            Sep 11, 2018 10:00 AM CDT   Linear Accelerator with Lr [facility] Rad Tech   Radiation Therapy Center ([facility])    [address]  [facility] [address]   [phone]            Sep 12, 2018  9:45 AM CDT   Linear Accelerator with Lr [facility] Rad Tech   Radiation Therapy Center ([facility])    [address]  [facility] [address]   [phone]            Sep 12, 2018 10:00 AM CDT   ON TREATMENT VISIT with Cesar Monsivais MD   Radiation Therapy Center ([facility])    [address]  [facility] [address]   [phone]            Sep 13, 2018  9:45 AM CDT   Linear Accelerator with Lr [facility] Rad Tech   Radiation Therapy Center ([facility])    [address]  [facility] [address]   [phone]              Who to contact     Please call your clinic at [phone] to:    Ask questions about your health    Make or cancel appointments    Discuss your medicines    Learn about your test results    Speak to your doctor            Additional Information About Your Visit        [employer] Information     [employer] gives you secure access to your electronic health record. If you see a primary care provider, you can also send messages to your care team and make appointments. If you have questions, please call your primary care clinic.  If you do not have a primary care provider, please call [phone] and they will assist you.      [employer] is an electronic gateway that provides easy, online access to your medical records. With [employer], you can request a clinic appointment, read your test results, renew a prescription or communicate with your care team.     To  "access your existing account, please contact your [facility] or call [phone] for assistance.        Care EveryWhere ID     This is your Care EveryWhere ID. This could be used by other organizations to access your [facility] medical records  [id number]        Your Vitals Were     Pulse Height BMI (Body Mass Index)             100 1.626 m (5' 4\") 27.12 kg/m2          Blood Pressure from Last 3 Encounters:   08/29/18 122/81   08/23/18 123/83   08/20/18 131/82    Weight from Last 3 Encounters:   08/29/18 71.3 kg (157 lb 3.2 oz)   08/23/18 71.7 kg (158 lb)   08/20/18 73.1 kg (161 lb 1.6 oz)              Today, you had the following     No orders found for display       Primary Care Provider Office Phone # Fax #    [facility] [phone]       [address] [place] [address]        Equal Access to Services     MARQUITA VALADEZ : Hadii aad ku hadasho Soomaali, waaxda luqadaha, qaybta kaalmada adeegyada, waxay idiin haydemi putnam . So [facility] [phone].    ATENCIÓN: Si habla español, tiene a lyman disposición servicios gratuitos de asistencia lingüística. [facility] [phone].    We comply with applicable federal civil rights laws and [place] laws. We do not discriminate on the basis of race, color, national origin, age, disability, sex, sexual orientation, or gender identity.            Thank you!     Thank you for choosing [facility] UROLOGY AND [facility] FOR PROSTATE AND UROLOGIC CANCERS  for your care. Our goal is always to provide you with excellent care. Hearing back from our patients is one way we can continue to improve our services. Please take a few minutes to complete the written survey that you may receive in the mail after your visit with us. Thank you!             Your Updated Medication List - Protect others around you: Learn how to safely use, store and throw away your medicines at www.disposemymeds.org.          This list " is accurate as of 8/23/18 11:59 PM.  Always use your most recent med list.                   Brand Name Dispense Instructions for use Diagnosis    acetaminophen 325 MG tablet    TYLENOL    150 tablet    Take 2 tablets (650 mg) by mouth every 4 hours as needed for other (multimodal surgical pain management along with NSAIDS and opioid medication as indicated based on pain control and physical function.)    Adrenal mass, right (H), Acute post-operative pain       buPROPion 100 MG tablet    WELLBUTRIN     TAKE 1 TABLET (100 MG TOTAL) BY MOUTH 2 (TWO) TIMES A DAY.        calcium carbonate antacid 1000 MG Chew      Take 1 tablet by mouth every morning        enoxaparin 40 MG/0.4ML injection    LOVENOX    14 Syringe    Inject 0.4 mLs (40 mg) Subcutaneous daily    Adrenal mass, right (H), H/O MTHFR mutation       EPINEPHrine 0.3 MG/0.3ML injection 2-pack    EPIPEN/ADRENACLICK/or ANY BX GENERIC EQUIV     As directed for bee stings        ferrous sulfate 325 (65 Fe) MG tablet    IRON     Take 325 mg by mouth daily        FLUoxetine 20 MG capsule    PROzac     Take 20 mg by mouth every morning        gabapentin 100 MG capsule    NEURONTIN     Take 100 mg by mouth        hydrocortisone 10 MG tablet    CORTEF    150 tablet    Take 3 pills (=30 mg) in AM and 2 pills (=20 mg) at 2 pm daily.        HYDROCORTISONE EX      Inject 100 mg into the muscle as needed        hydrocortisone sodium succinate  MG injection    solu-CORTEF    2 mL    Inject 2 mLs (100 mg) into the muscle once for 1 dose Use in emergency situations as discussed in clinic.    Secondary adrenal insufficiency (H)       Lidocaine 4 % Patch    LIDOCARE    14 patch    Place 2 patches onto the skin every 24 hours    Adrenal mass, right (H), Acute post-operative pain       LORazepam 0.5 MG tablet    ATIVAN    30 tablet    Take 1 tablet (0.5 mg) by mouth every 8 hours as needed for anxiety    Adrenal mass (H), Anxiety       * mitotane 500 MG tablet CHEMO     LYSODREN    120 tablet    Start at 500mg twice daily, then increase to 500mg TID then 500mg QID. Ultimate goal is to take 1gm QID.    Adrenal cortex cancer, right (H)       * mitotane 500 MG tablet CHEMO    LYSODREN    120 tablet    Start at 500mg twice daily, then increase to 500mg TID then 500mg QID. Ultimate goal is to take 1gm QID.    Adrenal cortex cancer, right (H)       MULTIvitamin  S Caps      Take 1 capsule by mouth        ondansetron 4 MG ODT tab    ZOFRAN-ODT     1 tablet        ONE-A-DAY WOMENS PO      Take 2 tablets by mouth every morning        oxyCODONE IR 10 MG tablet    ROXICODONE    22 tablet    Take 1 tablet (10 mg) by mouth every 4 hours as needed for moderate to severe pain    Carcinoma, adrenal cortical (H)       oxyCODONE-acetaminophen 5-325 MG per tablet    PERCOCET    12 tablet    Take 1-2 tablets by mouth every 4 hours as needed for pain        VITAMIN D-1000 MAX ST 1000 units Tabs   Generic drug:  cholecalciferol      Take 2,000 Units by mouth every morning        * Notice:  This list has 2 medication(s) that are the same as other medications prescribed for you. Read the directions carefully, and ask your doctor or other care provider to review them with you.

## 2018-08-23 NOTE — LETTER
"8/23/2018       RE: Suzy Rodríguez  [address]  [facility] [address]     Dear Colleague,    Thank you for referring your patient, Suzy Rodríguez, to the [facility] UROLOGY AND INST FOR PROSTATE AND UROLOGIC CANCERS at [facility]. Please see a copy of my visit note below.    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  Ki-67 mitotic rate 20%  wD7AgPp (CT chest shows 3 mm nodules)  pathology report classifies as pT2, clarified with Dr. Cifuentes     Stats from the national cancer database suggest 5 year survival is around 40%  [place] [employer] Study Group, numbers suggest 24% 5 year survival for stage 3    DHEA pre surgery was 740    Had an elevated DHEA pre-op    Most recent DHEA from 8/20/18 was < 15 so very good response and remains low    Since last being seen, she has seen Radiation Oncology and Medical Oncology. Ultimately she elected to proceed with a 6 week course of radiation therapy.     She continues on mitotane and reports soreness and joint pain in lower extremities as a bothersome side effect.     She also endorses pain in diaphragm area/right lung after 15 mins of light/moderate activity (brisk walking). Chest XR from 8/7/18 demonstrated no evidence of pleural effusion.        Patient is a 32 year old  female   Vitals: Blood pressure 123/83, pulse 100, height 1.626 m (5' 4\"), weight 71.7 kg (158 lb), not currently breastfeeding.  General Appearance Adult: Alert, no acute distress, oriented  Incisions are healing well.    A/P Adrenal Cortical Carcinoma, with possible positive margin, but no clear intraoperative evidence of a gross margin and the resection was as wide as possible without removing the cava.    Have already discussed at  oncology conf " "and after MedOnc and RadOnc consults she elected to proceed with radiation. Will plan to re-image her approximately 4-6 weeks after completion of treatment.     These are extremely rare tumors, and that makes evidence difficult to accumulate.    Dexamethasone suppression test with next set of labs    Will coordinate plan of care with her other physicians     Scribe Disclosure:   I,Camacho Nance, am serving as a scribe; to document services personally performed by Warren Mansfield MD based on data collection and the provider's statements to me.       Note, dictation software may have been used for this note and the content was not proofread for accuracy    Attestation:  This patient was seen and evaluated by me, with a scribe taking notes.  I have reviewed the note above and agree.  The physical exam and or any procedures were performed by me and the pertinant details are outlined below.     Patient is a 32 year old  female   Vitals: Blood pressure 123/83, pulse 100, height 1.626 m (5' 4\"), weight 71.7 kg (158 lb), not currently breastfeeding.  General Appearance Adult: Alert, no acute distress, oriented    Assessment:[id number] adrenocortical carcinoma    Plan:  Repeat lab testing and imaging in the near future.    Coordinate these with the conclusion of radiation therapy and Dr. Blanton team    Discussed the mitotane and risk of recurrence.    Getting radiation treatment    Will follow up after the conclusion of radiation      Again, thank you for allowing me to participate in the care of your patient.      Sincerely,    Warren Mansfield MD      "

## 2018-08-23 NOTE — TELEPHONE ENCOUNTER
PA needed for Solu- Cortef ACTO-VIAL  IM injection that she does herself  In case of emergency  She has  Adrenal insufficiency secondary to Adrenal cortex cancer. This is used as an Emergency dose  If she becomes sick and unable to  keep her oral hydrocortisone  down. This is a life saver  until she can get to the hospital  for IV medication.

## 2018-08-23 NOTE — TELEPHONE ENCOUNTER
Central Prior Authorization Team   Phone: [phone]      PA Initiation    Medication: hydrocortisone sodium succinate PF (SOLU-CORTEF) 100 MG injection-PA initiated  Insurance Company: [employer] - Phone [phone] Fax [phone]  Pharmacy Filling the Rx: [facility] [place]  [place] - [address]  Filling Pharmacy Phone: [phone]  Filling Pharmacy Fax:    Start Date: 8/23/2018

## 2018-08-24 ENCOUNTER — COMMUNICATION - HEALTHEAST (OUTPATIENT)
Dept: FAMILY MEDICINE | Facility: CLINIC | Age: 33
End: 2018-08-24

## 2018-08-24 LAB — LAB SCANNED RESULT: NORMAL

## 2018-08-24 NOTE — TELEPHONE ENCOUNTER
Prior Authorization Approval    Authorization Effective Date: 8/24/2018  Authorization Expiration Date: 8/24/2019  Medication: hydrocortisone sodium succinate PF (SOLU-CORTEF) 100 MG injection-PA Approved  Approved Dose/Quantity:   Reference #:     Insurance Company: [employer] - Phone [phone] Fax [phone]  Expected CoPay:       CoPay Card Available:      Foundation Assistance Needed:    Which Pharmacy is filling the prescription (Not needed for infusion/clinic administered): [facility] [place], [place] - [address]  Pharmacy Notified: Yes  Patient Notified: No

## 2018-08-27 ENCOUNTER — RECORDS - HEALTHEAST (OUTPATIENT)
Dept: ADMINISTRATIVE | Facility: OTHER | Age: 33
End: 2018-08-27

## 2018-08-27 ENCOUNTER — ONCOLOGY VISIT (OUTPATIENT)
Dept: ONCOLOGY | Facility: CLINIC | Age: 33
End: 2018-08-27
Attending: INTERNAL MEDICINE
Payer: COMMERCIAL

## 2018-08-27 ENCOUNTER — CARE COORDINATION (OUTPATIENT)
Dept: UROLOGY | Facility: CLINIC | Age: 33
End: 2018-08-27

## 2018-08-27 ENCOUNTER — APPOINTMENT (OUTPATIENT)
Dept: RADIATION THERAPY | Facility: OUTPATIENT CENTER | Age: 33
End: 2018-08-27
Payer: COMMERCIAL

## 2018-08-27 ENCOUNTER — TRANSFERRED RECORDS (OUTPATIENT)
Dept: HEALTH INFORMATION MANAGEMENT | Facility: CLINIC | Age: 33
End: 2018-08-27

## 2018-08-27 DIAGNOSIS — F41.9 ANXIETY: Primary | ICD-10-CM

## 2018-08-27 PROCEDURE — 90791 PSYCH DIAGNOSTIC EVALUATION: CPT | Performed by: PSYCHOLOGIST

## 2018-08-27 NOTE — PROGRESS NOTES
Can you please let Suzy know that the pathologist reviewed and called it a stage T2? Not T3.  This is good news!  Lower risk of recurrence.     Jaret     ----- Message -----      From: Jenna Cifuentes MD      Sent: 8/24/2018   2:54 PM        To: Warren Mansfield MD     Jaret,   This is invading into the adrenal capsule but does not extend beyond it. So it is a T2. The confusion is because we use a data element checklist that has both these elements in-built.   Thanks   Jenna       ----- Message -----      From: Warren Mansfield MD      Sent: 8/24/2018   8:26 AM        To: Jenna Cifuentes MD     This is the Adrenal tumor we need to know if it invaded through the capsule.     Seems to say so in the note there was invasion through the capsule.  But note says T2     Jaret                             Patient notified.

## 2018-08-27 NOTE — LETTER
8/27/2018         RE: Suzy Rodríguez  [address]  [facility] [address]        Dear Colleague,    Thank you for referring your patient, Suzy Rodríguez, to the [facility]. Please see a copy of my visit note below.    Confidential Summary of Oncology Psychology Initial Visit    Suzy Rodríguez is a 32 year old female who was referred by Dr. Cintron for  Anxiety  The patient was seen for an initial 45 minute evaluation on 8/27/2018.    Presenting Concerns: Endorsed symptoms included worry, fear, nervousness, difficulty getting to sleep, difficulty staying asleep, guilt, irritability, fatigue, cancer ruminations, and a sense of foreboding.     Mental Status/Interview:   Orientation: Suzy Rodríguez was alert, attentive, and oriented to time, place, and person  Affect: Affect was appropriate to the situation and showed normal range and stability.  Speech: Speech was clear with normal fluency, rate, tone, and volume.  Memory: Although not formally assessed, immediate, recent, and remote memory appeared to be intact.   Insight and Judgement: Suzy Rodríguez demonstrates adequate awareness of the issues discussed and was able to come to reasonable conclusions.  Thought: Thought patterns were coherent and logical. Hallucinations were denied and delusions were not evident.   Lethality: Suzy Rodríguez denied suicidal or homicidal ideation or intention.        Impression: Her ruminations about her cancer, her guilt related to how her illness is affecting her family, and worry about her future are the primary causes of her anxiousness. Cognitively she understands this relationship but emotionally she is blunted and may not be fully willing to engage in the cognitive therapy necessary to reverse her anxiety. She was encouraged to return for additional appointments because her guilt does not allow her to seek the amount of support she needs from family or friends.    Diagnosis:   Encounter Diagnosis   Name  Primary?     Anxiety Yes     Recommendations/Plan:  1. Use the tools discussed to reduce cancer ruminations and guilt  2. Return for additional appointments    Thank you for this opportunity to participate in your care of this patient.    Cassie Awan.TRACEY., L.P.  Director, Oncology Supportive Care     Again, thank you for allowing me to participate in the care of your patient.        Sincerely,        Ector Marcelino PsyD

## 2018-08-28 ENCOUNTER — APPOINTMENT (OUTPATIENT)
Dept: RADIATION THERAPY | Facility: OUTPATIENT CENTER | Age: 33
End: 2018-08-28
Payer: COMMERCIAL

## 2018-08-28 ENCOUNTER — RECORDS - HEALTHEAST (OUTPATIENT)
Dept: ADMINISTRATIVE | Facility: OTHER | Age: 33
End: 2018-08-28

## 2018-08-28 NOTE — PROGRESS NOTES
Confidential Summary of Oncology Psychology Initial Visit    Suzy Rodríguez is a 32 year old female who was referred by Dr. Cintron for  Anxiety  The patient was seen for an initial 45 minute evaluation on 8/27/2018.    Presenting Concerns: Endorsed symptoms included worry, fear, nervousness, difficulty getting to sleep, difficulty staying asleep, guilt, irritability, fatigue, cancer ruminations, and a sense of foreboding.     Mental Status/Interview:   Orientation: Suzy Rodríguez was alert, attentive, and oriented to time, place, and person  Affect: Affect was appropriate to the situation and showed normal range and stability.  Speech: Speech was clear with normal fluency, rate, tone, and volume.  Memory: Although not formally assessed, immediate, recent, and remote memory appeared to be intact.   Insight and Judgement: Suzy Rodríguez demonstrates adequate awareness of the issues discussed and was able to come to reasonable conclusions.  Thought: Thought patterns were coherent and logical. Hallucinations were denied and delusions were not evident.   Lethality: Suzy Rodríguez denied suicidal or homicidal ideation or intention.        Impression: Her ruminations about her cancer, her guilt related to how her illness is affecting her family, and worry about her future are the primary causes of her anxiousness. Cognitively she understands this relationship but emotionally she is blunted and may not be fully willing to engage in the cognitive therapy necessary to reverse her anxiety. She was encouraged to return for additional appointments because her guilt does not allow her to seek the amount of support she needs from family or friends.    Diagnosis:   Encounter Diagnosis   Name Primary?     Anxiety Yes     Recommendations/Plan:  1. Use the tools discussed to reduce cancer ruminations and guilt  2. Return for additional appointments    Thank you for this opportunity to participate in your care of this  patient.    Ector Marcelino Psy.D., L.P.  Director, Oncology Supportive Care

## 2018-08-29 ENCOUNTER — APPOINTMENT (OUTPATIENT)
Dept: RADIATION THERAPY | Facility: OUTPATIENT CENTER | Age: 33
End: 2018-08-29
Payer: COMMERCIAL

## 2018-08-29 ENCOUNTER — OFFICE VISIT (OUTPATIENT)
Dept: RADIATION THERAPY | Facility: OUTPATIENT CENTER | Age: 33
End: 2018-08-29
Payer: COMMERCIAL

## 2018-08-29 ENCOUNTER — RECORDS - HEALTHEAST (OUTPATIENT)
Dept: ADMINISTRATIVE | Facility: OTHER | Age: 33
End: 2018-08-29

## 2018-08-29 VITALS
BODY MASS INDEX: 26.98 KG/M2 | DIASTOLIC BLOOD PRESSURE: 81 MMHG | HEART RATE: 84 BPM | SYSTOLIC BLOOD PRESSURE: 122 MMHG | WEIGHT: 157.2 LBS

## 2018-08-29 DIAGNOSIS — C74.01 ADRENAL CORTEX CANCER, RIGHT (H): ICD-10-CM

## 2018-08-29 RX ORDER — ONDANSETRON 8 MG/1
8 TABLET, FILM COATED ORAL EVERY 8 HOURS PRN
Qty: 30 TABLET | Refills: 1 | Status: SHIPPED | OUTPATIENT
Start: 2018-08-29 | End: 2018-11-26

## 2018-08-29 NOTE — LETTER
"2018      RE: Suzy Rodríguez  [address]  [facility] [address]       Dear Colleague,    Thank you for the opportunity to participate in the care of your patient, Suzy Rodríguez, at the [facility] at [facility]. Please see a copy of my visit note below.    [facility] PHYSICIANS  SPECIALIZING IN BREAKTHROUGHS  Radiation Oncology    On Treatment Visit Note      Suzy Rodríguez      Date: 2018   MRN: [id number]   : 1985  Diagnosis: adrenal carcinoma      Reason for Visit:  On Radiation Treatment Visit     Treatment Summary to Date  Treatment Site: abdomen Current Dose: 540/5400 cGy Fractions: 3/30           Subjective:  Mild fatigue. Mild nausea and loose stool with mitotane controlled with Zofran and imodium x 1. Mild right posterior chest wall pain, worsened with activity since starting RT.      Nursing ROS:   Nutrition Alteration  Diet Type: Patient's Preference  Nutrition Note: recent diet change has helped bowels  Skin  Skin Reaction: 0 - No changes        Cardiovascular  Respiratory effort: 1 - Normal - without distress  Gastrointestinal  Nausea: 1 - One to two episodes of nausea/24  Diarrhea: 1 - Abdominal cramping, two or less soft or liquid bowel movements  GI Note: nausea 1x/day-takes zofran, bowels loose since starting chemo pill 1 month ago, takes imodium in the morning to manage bowels  Genitourinary  Urinary Status: 0 - Normal     Pain Assessment  0-10 Pain Scale: 0  Pain Note: right \"side cramps\" yesterday and today      Objective:   /81  Pulse 84  Wt 71.3 kg (157 lb 3.2 oz)  BMI 26.98 kg/m2  No apparent distress.   Abdomen soft, nontender.    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 Tolerating treatment. Tolerating radiation therapy well.  All questions and concerns addressed.    Plan:   1. Continue current therapy.    2. Mild nausea and loose stool from mitotane, started prior to RT. Stable. Currently controlled on Zofran and Imodium x 1. Will continue to monitor. If GI symptoms worsen, would discuss with Dr. Cintron and consider holding mitotane during RT to limit side effects.  3. Mild right sided posterior chest wall pain in radiated field. Likely musculoskeletal in etiology, worsen with activity. Patient allergic to NSAID (angioedema), wishes to try tylenol. If worsenes, can consider narcotic or muscle relaxant.       Mosaiq chart and setup information reviewed  Ports checked    Medication Review  Med list reviewed with patient?: Yes           Cesar Monsivais MD

## 2018-08-29 NOTE — PROGRESS NOTES
"[facility] PHYSICIANS  SPECIALIZING IN BREAKTHROUGHS  Radiation Oncology    On Treatment Visit Note      Suzy Rodríguez      Date: 2018   MRN: [id number]   : 1985  Diagnosis: adrenal carcinoma      Reason for Visit:  On Radiation Treatment Visit     Treatment Summary to Date  Treatment Site: abdomen Current Dose: 540/5400 cGy Fractions: 3/30           Subjective:  Mild fatigue. Mild nausea and loose stool with mitotane controlled with Zofran and imodium x 1. Mild right posterior chest wall pain, worsened with activity since starting RT.      Nursing ROS:   Nutrition Alteration  Diet Type: Patient's Preference  Nutrition Note: recent diet change has helped bowels  Skin  Skin Reaction: 0 - No changes        Cardiovascular  Respiratory effort: 1 - Normal - without distress  Gastrointestinal  Nausea: 1 - One to two episodes of nausea/24  Diarrhea: 1 - Abdominal cramping, two or less soft or liquid bowel movements  GI Note: nausea 1x/day-takes zofran, bowels loose since starting chemo pill 1 month ago, takes imodium in the morning to manage bowels  Genitourinary  Urinary Status: 0 - Normal     Pain Assessment  0-10 Pain Scale: 0  Pain Note: right \"side cramps\" yesterday and today      Objective:   /81  Pulse 84  Wt 71.3 kg (157 lb 3.2 oz)  BMI 26.98 kg/m2  No apparent distress.   Abdomen soft, nontender.    Labs:  CBC RESULTS:   Recent Labs   Lab Test  18   1410   WBC  7.4   RBC  4.68   HGB  9.9*   HCT  35.9   MCV  77*   MCH  21.2*   MCHC  27.6*   RDW  16.4*   PLT  375     ELECTROLYTES:  Recent Labs   Lab Test  18   1410   NA  138   POTASSIUM  4.0   CHLORIDE  102   SHASHA  8.9   CO2  29   BUN  4*   CR  0.59   GLC  89       Assessment:  32F with right adrenal gland carcinoma s/p R2 resection receiving adjuvant radiation. Tolerating treatment. Tolerating radiation therapy well.  All questions and concerns addressed.    Plan:   1. Continue current therapy.    2. Mild nausea " and loose stool from mitotane, started prior to RT. Stable. Currently controlled on Zofran and Imodium x 1. Will continue to monitor. If GI symptoms worsen, would discuss with Dr. Cintron and consider holding mitotane during RT to limit side effects.  3. Mild right sided posterior chest wall pain in radiated field. Likely musculoskeletal in etiology, worsen with activity. Patient allergic to NSAID (angioedema), wishes to try tylenol. If worsenes, can consider narcotic or muscle relaxant.       Mosaiq chart and setup information reviewed  Ports checked    Medication Review  Med list reviewed with patient?: Yes           Cesar Monsivais MD

## 2018-08-29 NOTE — PROGRESS NOTES
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  Ki-67 mitotic rate 20%  [id number] (CT chest shows 3 mm nodules)  pathology report classifies as pT2, clarified with Dr. Cifuentes     Stats from the national cancer database suggest 5 year survival is around 40%  [place] [employer] Study Group, numbers suggest 24% 5 year survival for stage 3    DHEA pre surgery was 740    Had an elevated DHEA pre-op    Most recent DHEA from 8/20/18 was < 15 so very good response and remains low    Since last being seen, she has seen Radiation Oncology and Medical Oncology. Ultimately she elected to proceed with a 6 week course of radiation therapy.     She continues on mitotane and reports soreness and joint pain in lower extremities as a bothersome side effect.     She also endorses pain in diaphragm area/right lung after 15 mins of light/moderate activity (brisk walking). Chest XR from 8/7/18 demonstrated no evidence of pleural effusion.        Patient is a 32 year old  female   Vitals: Blood pressure 123/83, pulse 100, height 1.626 m (5' 4\"), weight 71.7 kg (158 lb), not currently breastfeeding.  General Appearance Adult: Alert, no acute distress, oriented  Incisions are healing well.    A/P Adrenal Cortical Carcinoma, with possible positive margin, but no clear intraoperative evidence of a gross margin and the resection was as wide as possible without removing the cava.    Have already discussed at  oncology conf and after MedOnc and RadOnc consults she elected to proceed with radiation. Will plan to re-image her approximately 4-6 weeks after completion of treatment.     These are extremely rare tumors, and that makes evidence difficult to accumulate.    Dexamethasone suppression test with next set of labs    Will coordinate plan of " "care with her other physicians     Scribe Disclosure:   I,Camacho Langleyaji, am serving as a scribe; to document services personally performed by Warren Mansfield MD based on data collection and the provider's statements to me.     Warren Mansfiedl MD  Department of Urology Staff  [facility]    Note, dictation software may have been used for this note and the content was not proofread for accuracy    Attestation:  This patient was seen and evaluated by me, with a scribe taking notes.  I have reviewed the note above and agree.  The physical exam and or any procedures were performed by me and the pertinant details are outlined below.     Patient is a 32 year old  female   Vitals: Blood pressure 123/83, pulse 100, height 1.626 m (5' 4\"), weight 71.7 kg (158 lb), not currently breastfeeding.  General Appearance Adult: Alert, no acute distress, oriented    Assessment:[id number] adrenocortical carcinoma    Plan:  Repeat lab testing and imaging in the near future.    Coordinate these with the conclusion of radiation therapy and Dr. Blanton team    Discussed the mitotane and risk of recurrence.    Getting radiation treatment    Will follow up after the conclusion of radiation    Warren Mansfield MD  Department of Urology  [facility]            "

## 2018-08-29 NOTE — LETTER
"2018      RE: Suzy Rodríguez  [address]  [facility] [address]       [facility] PHYSICIANS  SPECIALIZING IN BREAKTHROUGHS  Radiation Oncology    On Treatment Visit Note      Suzy Rodríguez      Date: 2018   MRN: [id number]   : 1985  Diagnosis: adrenal carcinoma      Reason for Visit:  On Radiation Treatment Visit     Treatment Summary to Date  Treatment Site: abdomen Current Dose: 540/5400 cGy Fractions: 3/30           Subjective:  Mild fatigue. Mild nausea and loose stool with mitotane controlled with Zofran and imodium x 1. Mild right posterior chest wall pain, worsened with activity since starting RT.      Nursing ROS:   Nutrition Alteration  Diet Type: Patient's Preference  Nutrition Note: recent diet change has helped bowels  Skin  Skin Reaction: 0 - No changes        Cardiovascular  Respiratory effort: 1 - Normal - without distress  Gastrointestinal  Nausea: 1 - One to two episodes of nausea/24  Diarrhea: 1 - Abdominal cramping, two or less soft or liquid bowel movements  GI Note: nausea 1x/day-takes zofran, bowels loose since starting chemo pill 1 month ago, takes imodium in the morning to manage bowels  Genitourinary  Urinary Status: 0 - Normal     Pain Assessment  0-10 Pain Scale: 0  Pain Note: right \"side cramps\" yesterday and today      Objective:   /81  Pulse 84  Wt 71.3 kg (157 lb 3.2 oz)  BMI 26.98 kg/m2  No apparent distress.   Abdomen soft, nontender.    Labs:  CBC RESULTS:   Recent Labs   Lab Test  18   1410   WBC  7.4   RBC  4.68   HGB  9.9*   HCT  35.9   MCV  77*   MCH  21.2*   MCHC  27.6*   RDW  16.4*   PLT  375     ELECTROLYTES:  Recent Labs   Lab Test  18   1410   NA  138   POTASSIUM  4.0   CHLORIDE  102   SHASHA  8.9   CO2  29   BUN  4*   CR  0.59   GLC  89       Assessment:  32F with right adrenal gland carcinoma s/p R2 resection receiving adjuvant radiation. Tolerating treatment. Tolerating radiation therapy well.  All questions and " concerns addressed.    Plan:   1. Continue current therapy.    2. Mild nausea and loose stool from mitotane, started prior to RT. Stable. Currently controlled on Zofran and Imodium x 1. Will continue to monitor. If GI symptoms worsen, would discuss with Dr. Cintron and consider holding mitotane during RT to limit side effects.  3. Mild right sided posterior chest wall pain in radiated field. Likely musculoskeletal in etiology, worsen with activity. Patient allergic to NSAID (angioedema), wishes to try tylenol. If worsenes, can consider narcotic or muscle relaxant.       Mosaiq chart and setup information reviewed  Ports checked    Medication Review  Med list reviewed with patient?: Yes           MD Cesar Martinez MD

## 2018-08-29 NOTE — MR AVS SNAPSHOT
After Visit Summary   8/29/2018    Suzy Rodríguez    MRN: [id number]           Patient Information     Date Of Birth          1985        Visit Information        Provider Department      8/29/2018 10:00 AM Cesar Monsivais MD [facility]        Today's Diagnoses     Adrenal cortex cancer, right (H)           Follow-ups after your visit        Your next 10 appointments already scheduled     Aug 30, 2018  9:45 AM CDT   Linear Accelerator with [facility] Rad Tech   Rad[facility] Center ([facility])    [address], [address]  [facility] [address]   [phone]            Aug 31, 2018  9:45 AM CDT   Linear Accelerator with [facility] Rad Tech   [facility] ([facility])    [address]  [facility] [address]   [phone]            Sep 04, 2018  9:45 AM CDT   Linear Accelerator with [facility] Rad Tech   [facility] ([facility])    [address]  [facility] [address]   [phone]            Sep 05, 2018  9:45 AM CDT   Linear Accelerator with [facility] Rad Tech   [facility] ([facility])    [address]  [facility] [address]   [phone]            Sep 05, 2018 10:00 AM CDT   ON TREATMENT VISIT with Cesar Monsivais MD   [facility] ([facility])    [address]  [facility] [address]   [phone]            Sep 06, 2018  9:45 AM CDT   Linear Accelerator with [facility] Rad Tech   [facility] Center ([facility])    [address]  [facility] [address]   [phone]            Sep 07, 2018  9:45 AM CDT   Linear Accelerator with [facility] Rad Tech   [facility] ([facility])    [address]  [facility] [address]   [phone]            Sep 10, 2018  9:45 AM CDT   Linear Accelerator with [facility] Rad Tech   Radiation Therapy Center ([facility])    [address]  [facility] [address]   [phone]            Sep 11, 2018 10:00 AM CDT   Linear Accelerator with [facility]   Radiation [facility] [place] ([facility])    [address]  [facility] [address]   [phone]            Sep 12, 2018  9:45 AM CDT   Linear Accelerator with [facility]   Radiation Therapy [place] ([facility])    [address]  [facility] [address]   [phone]              Who to contact     Please call your clinic at [phone] to:    Ask questions about your health    Make or cancel appointments    Discuss your medicines    Learn about your test results    Speak to your doctor            Additional Information About Your Visit        [employer] Information     [employer] gives you secure access to your electronic health record. If you see a primary care provider, you can also send messages to your care team and make appointments. If you have questions, please call your primary care clinic.  If you do not have a primary care provider, please call [phone] and they will assist you.      [employer] is an electronic gateway that provides easy, online access to your medical records. With [employer], you can request a clinic appointment, read your test results, renew a prescription or communicate with your care team.     To access your existing account, please contact your [facility] or call [phone] for assistance.        Care EveryWhere ID     This is your Care EveryWhere ID. This could be used by other organizations to access your [place] medical records  [id number]        Your Vitals Were     Pulse BMI (Body Mass Index)                84 26.98 kg/m2           Blood Pressure from Last 3 Encounters:   08/29/18 122/81   08/23/18 123/83   08/20/18 131/82    Weight from Last 3 Encounters:   08/29/18 71.3 kg (157 lb 3.2 oz)   08/23/18 71.7 kg (158 lb)    08/20/18 73.1 kg (161 lb 1.6 oz)              Today, you had the following     No orders found for display         Where to get your medicines      These medications were sent to [facility] [place], [place] - [address]  [address] [facility] [address]     Phone:  [phone]     ondansetron 8 MG tablet          Primary Care Provider Office Phone # Fax #    [facility] [phone] [address] [facility] [address]        Equal Access to Services     MARQUITA VALADEZ : Hadii aad ku hadasho Soomaali, waaxda luqadaha, qaybta kaalmada adeegyada, waxay mary putnam . So [facility] [phone].    ATENCIÓN: Si habla español, tiene a lyman disposición servicios gratuitos de asistencia lingüística. [facility] [phone].    We comply with applicable federal civil rights laws and [place] laws. We do not discriminate on the basis of race, color, national origin, age, disability, sex, sexual orientation, or gender identity.            Thank you!     Thank you for choosing [facility]  for your care. Our goal is always to provide you with excellent care. Hearing back from our patients is one way we can continue to improve our services. Please take a few minutes to complete the written survey that you may receive in the mail after your visit with us. Thank you!             Your Updated Medication List - Protect others around you: Learn how to safely use, store and throw away your medicines at www.disposemymeds.org.          This list is accurate as of 8/29/18 10:54 AM.  Always use your most recent med list.                   Brand Name Dispense Instructions for use Diagnosis    acetaminophen 325 MG tablet    TYLENOL    150 tablet    Take 2 tablets (650 mg) by mouth every 4 hours as needed for other (multimodal surgical pain management along with NSAIDS and opioid medication as indicated based on pain control and physical  function.)    Adrenal mass, right (H), Acute post-operative pain       buPROPion 100 MG tablet    WELLBUTRIN     TAKE 1 TABLET (100 MG TOTAL) BY MOUTH 2 (TWO) TIMES A DAY.        calcium carbonate antacid 1000 MG Chew      Take 1 tablet by mouth every morning        enoxaparin 40 MG/0.4ML injection    LOVENOX    14 Syringe    Inject 0.4 mLs (40 mg) Subcutaneous daily    Adrenal mass, right (H), H/O MTHFR mutation       EPINEPHrine 0.3 MG/0.3ML injection 2-pack    EPIPEN/ADRENACLICK/or ANY BX GENERIC EQUIV     As directed for bee stings        ferrous sulfate 325 (65 Fe) MG tablet    IRON     Take 325 mg by mouth daily        FLUoxetine 20 MG capsule    PROzac     Take 20 mg by mouth every morning        gabapentin 100 MG capsule    NEURONTIN     Take 100 mg by mouth        hydrocortisone 10 MG tablet    CORTEF    150 tablet    Take 3 pills (=30 mg) in AM and 2 pills (=20 mg) at 2 pm daily.        HYDROCORTISONE EX      Inject 100 mg into the muscle as needed        hydrocortisone sodium succinate  MG injection    solu-CORTEF    2 mL    Inject 2 mLs (100 mg) into the muscle once for 1 dose Use in emergency situations as discussed in clinic.    Secondary adrenal insufficiency (H)       Lidocaine 4 % Patch    LIDOCARE    14 patch    Place 2 patches onto the skin every 24 hours    Adrenal mass, right (H), Acute post-operative pain       LORazepam 0.5 MG tablet    ATIVAN    30 tablet    Take 1 tablet (0.5 mg) by mouth every 8 hours as needed for anxiety    Adrenal mass (H), Anxiety       * mitotane 500 MG tablet CHEMO    LYSODREN    120 tablet    Start at 500mg twice daily, then increase to 500mg TID then 500mg QID. Ultimate goal is to take 1gm QID.    Adrenal cortex cancer, right (H)       * mitotane 500 MG tablet CHEMO    LYSODREN    120 tablet    Start at 500mg twice daily, then increase to 500mg TID then 500mg QID. Ultimate goal is to take 1gm QID.    Adrenal cortex cancer, right (H)       MULTIvitamin  S Caps       Take 1 capsule by mouth        ondansetron 4 MG ODT tab    ZOFRAN-ODT     1 tablet        ondansetron 8 MG tablet    ZOFRAN    30 tablet    Take 1 tablet (8 mg) by mouth every 8 hours as needed for nausea    Adrenal cortex cancer, right (H)       ONE-A-DAY WOMENS PO      Take 2 tablets by mouth every morning        oxyCODONE IR 10 MG tablet    ROXICODONE    22 tablet    Take 1 tablet (10 mg) by mouth every 4 hours as needed for moderate to severe pain    Carcinoma, adrenal cortical (H)       oxyCODONE-acetaminophen 5-325 MG per tablet    PERCOCET    12 tablet    Take 1-2 tablets by mouth every 4 hours as needed for pain        VITAMIN D-1000 MAX ST 1000 units Tabs   Generic drug:  cholecalciferol      Take 2,000 Units by mouth every morning        * Notice:  This list has 2 medication(s) that are the same as other medications prescribed for you. Read the directions carefully, and ask your doctor or other care provider to review them with you.

## 2018-08-30 ENCOUNTER — AMBULATORY - HEALTHEAST (OUTPATIENT)
Dept: SURGERY | Facility: CLINIC | Age: 33
End: 2018-08-30

## 2018-08-30 ENCOUNTER — COMMUNICATION - HEALTHEAST (OUTPATIENT)
Dept: SURGERY | Facility: CLINIC | Age: 33
End: 2018-08-30

## 2018-08-30 ENCOUNTER — APPOINTMENT (OUTPATIENT)
Dept: RADIATION THERAPY | Facility: OUTPATIENT CENTER | Age: 33
End: 2018-08-30
Payer: COMMERCIAL

## 2018-08-30 ENCOUNTER — COMMUNICATION - HEALTHEAST (OUTPATIENT)
Dept: FAMILY MEDICINE | Facility: CLINIC | Age: 33
End: 2018-08-30

## 2018-08-30 DIAGNOSIS — Z98.84 HX OF GASTRIC BYPASS: ICD-10-CM

## 2018-08-31 ENCOUNTER — APPOINTMENT (OUTPATIENT)
Dept: RADIATION THERAPY | Facility: OUTPATIENT CENTER | Age: 33
End: 2018-08-31
Payer: COMMERCIAL

## 2018-08-31 DIAGNOSIS — C74.01 ADRENAL CORTEX CANCER, RIGHT (H): Primary | ICD-10-CM

## 2018-09-04 ENCOUNTER — APPOINTMENT (OUTPATIENT)
Dept: RADIATION THERAPY | Facility: OUTPATIENT CENTER | Age: 33
End: 2018-09-04
Payer: COMMERCIAL

## 2018-09-05 ENCOUNTER — RECORDS - HEALTHEAST (OUTPATIENT)
Dept: ADMINISTRATIVE | Facility: OTHER | Age: 33
End: 2018-09-05

## 2018-09-05 ENCOUNTER — OFFICE VISIT (OUTPATIENT)
Dept: RADIATION THERAPY | Facility: OUTPATIENT CENTER | Age: 33
End: 2018-09-05
Payer: COMMERCIAL

## 2018-09-05 ENCOUNTER — APPOINTMENT (OUTPATIENT)
Dept: RADIATION THERAPY | Facility: OUTPATIENT CENTER | Age: 33
End: 2018-09-05
Payer: COMMERCIAL

## 2018-09-05 VITALS
DIASTOLIC BLOOD PRESSURE: 75 MMHG | SYSTOLIC BLOOD PRESSURE: 109 MMHG | OXYGEN SATURATION: 100 % | RESPIRATION RATE: 18 BRPM | BODY MASS INDEX: 25.92 KG/M2 | WEIGHT: 151 LBS | HEART RATE: 79 BPM

## 2018-09-05 DIAGNOSIS — R11.0 NAUSEA: Primary | ICD-10-CM

## 2018-09-05 RX ORDER — PROCHLORPERAZINE MALEATE 10 MG
10 TABLET ORAL EVERY 6 HOURS PRN
Qty: 90 TABLET | Refills: 1 | Status: SHIPPED | OUTPATIENT
Start: 2018-09-05 | End: 2018-10-03

## 2018-09-05 NOTE — MR AVS SNAPSHOT
After Visit Summary   9/5/2018    Suzy Rodríguez    MRN: [id number]           Patient Information     Date Of Birth          1985        Visit Information        Provider Department      9/5/2018 10:00 AM Cesar Monsivais MD [facility]         Follow-ups after your visit        Your next 10 appointments already scheduled     Sep 06, 2018  9:45 AM CDT   Linear Accelerator with Lr p Rad Tech   [facility] ([facility])    [address], [address]  [facility] [address]   [phone]            Sep 07, 2018  9:45 AM CDT   Linear Accelerator with Lr Ump Rad Tech   [facility] ([facility])    [address], [address]  [place] [address]   [phone]            Sep 10, 2018  9:45 AM CDT   Linear Accelerator with Lr p Rad Tech   [facility] ([facility])    [address], [address]  [facility] [address]   [phone]            Sep 11, 2018 10:00 AM CDT   Linear Accelerator with Lr p Rad Tech   [facility] ([facility])    [address], [address]  [place] [address]   [phone]            Sep 12, 2018  9:45 AM CDT   Linear Accelerator with Lr p Rad Tech   [facility] ([facility])    [address], [address]  [place] [address]   [phone]            Sep 12, 2018 10:00 AM CDT   ON TREATMENT VISIT with Cesar Monsivais MD   [facility] ([facility])    [address], [address]  [facility] [address]   [phone]            Sep 13, 2018  9:45 AM CDT   Linear Accelerator with Lr Ump Rad Tech   [facility] ([facility])    [address], [address]  [facility] [address]   [phone]            Sep 14, 2018  9:45 AM CDT   Linear Accelerator with Lr p Rad Tech   [facility] ([facility])    [address], [address] [place] [address]  [place] [address]   [phone]            Sep 17, 2018  9:45 AM CDT   Linear Accelerator with Lr [facility]   [facility] ([facility])    [address], [address]  [facility] [address]   [phone]            Sep 18, 2018  9:45 AM CDT   Linear Accelerator with Lr Ump Rad [facility]   [facility] ([facility])    [address], [address]  [facility] [address]   [phone]              Who to contact     Please call your clinic at [phone] to:    Ask questions about your health    Make or cancel appointments    Discuss your medicines    Learn about your test results    Speak to your doctor            Additional Information About Your Visit        [employer] Information     [employer] gives you secure access to your electronic health record. If you see a primary care provider, you can also send messages to your care team and make appointments. If you have questions, please call your primary care clinic.  If you do not have a primary care provider, please call [phone] and they will assist you.      [employer] is an electronic gateway that provides easy, online access to your medical records. With [employer], you can request a clinic appointment, read your test results, renew a prescription or communicate with your care team.     To access your existing account, please contact your [facility] or call [phone] for assistance.        Care EveryWhere ID     This is your Care EveryWhere ID. This could be used by other organizations to access your [place] medical records  [id number]        Your Vitals Were     Pulse Respirations Pulse Oximetry BMI (Body Mass Index)          79 18 100% 25.92 kg/m2         Blood Pressure from Last 3 Encounters:   09/05/18 109/75   08/29/18 122/81   08/23/18 123/83    Weight from Last 3 Encounters:   09/05/18 68.5 kg (151 lb)   08/29/18 71.3 kg (157 lb 3.2 oz)   08/23/18 71.7 kg (158 lb)              Today,  you had the following     No orders found for display       Primary Care Provider Office Phone # Fax #    [facility] [phone] [address] [facility] [address]        Equal Access to Services     MARQUITA VALADEZ : Hadii aad ku hadbalbir Sobree, wajavida luqadaha, qaybta kaalmada rodo, coleman stokes deion devaughn jersey young. So [facility] [phone].    ATENCIÓN: Si habla español, tiene a lyman disposición servicios gratuitos de asistencia lingüística. Llame al [phone].    We comply with applicable federal civil rights laws and [place] laws. We do not discriminate on the basis of race, color, national origin, age, disability, sex, sexual orientation, or gender identity.            Thank you!     Thank you for choosing [facility]  for your care. Our goal is always to provide you with excellent care. Hearing back from our patients is one way we can continue to improve our services. Please take a few minutes to complete the written survey that you may receive in the mail after your visit with us. Thank you!             Your Updated Medication List - Protect others around you: Learn how to safely use, store and throw away your medicines at www.disposemymeds.org.          This list is accurate as of 9/5/18 10:26 AM.  Always use your most recent med list.                   Brand Name Dispense Instructions for use Diagnosis    acetaminophen 325 MG tablet    TYLENOL    150 tablet    Take 2 tablets (650 mg) by mouth every 4 hours as needed for other (multimodal surgical pain management along with NSAIDS and opioid medication as indicated based on pain control and physical function.)    Adrenal mass, right (H), Acute post-operative pain       buPROPion 100 MG tablet    WELLBUTRIN     TAKE 1 TABLET (100 MG TOTAL) BY MOUTH 2 (TWO) TIMES A DAY.        calcium carbonate antacid 1000 MG Chew      Take 1 tablet by mouth every morning        enoxaparin 40 MG/0.4ML  injection    LOVENOX    14 Syringe    Inject 0.4 mLs (40 mg) Subcutaneous daily    Adrenal mass, right (H), H/O MTHFR mutation       EPINEPHrine 0.3 MG/0.3ML injection 2-pack    EPIPEN/ADRENACLICK/or ANY BX GENERIC EQUIV     As directed for bee stings        ferrous sulfate 325 (65 Fe) MG tablet    IRON     Take 325 mg by mouth daily        FLUoxetine 20 MG capsule    PROzac     Take 20 mg by mouth every morning        gabapentin 100 MG capsule    NEURONTIN     Take 100 mg by mouth        hydrocortisone 10 MG tablet    CORTEF    150 tablet    Take 3 pills (=30 mg) in AM and 2 pills (=20 mg) at 2 pm daily.        HYDROCORTISONE EX      Inject 100 mg into the muscle as needed        hydrocortisone sodium succinate  MG injection    solu-CORTEF    2 mL    Inject 2 mLs (100 mg) into the muscle once for 1 dose Use in emergency situations as discussed in clinic.    Secondary adrenal insufficiency (H)       Lidocaine 4 % Patch    LIDOCARE    14 patch    Place 2 patches onto the skin every 24 hours    Adrenal mass, right (H), Acute post-operative pain       LORazepam 0.5 MG tablet    ATIVAN    30 tablet    Take 1 tablet (0.5 mg) by mouth every 8 hours as needed for anxiety    Adrenal mass (H), Anxiety       mitotane 500 MG tablet CHEMO    LYSODREN    120 tablet    Take 1 gram at 7 am and then 1 gram at 3 pm. No doses at night because of insomnia and other side effects. Take nausea med before each dose.    Adrenal cortex cancer, right (H)       MULTIvitamin  S Caps      Take 1 capsule by mouth        ondansetron 8 MG tablet    ZOFRAN    30 tablet    Take 1 tablet (8 mg) by mouth every 8 hours as needed for nausea    Adrenal cortex cancer, right (H)       ONE-A-DAY WOMENS PO      Take 2 tablets by mouth every morning        oxyCODONE IR 10 MG tablet    ROXICODONE    22 tablet    Take 1 tablet (10 mg) by mouth every 4 hours as needed for moderate to severe pain    Carcinoma, adrenal cortical (H)        oxyCODONE-acetaminophen 5-325 MG per tablet    PERCOCET    12 tablet    Take 1-2 tablets by mouth every 4 hours as needed for pain        VITAMIN D-1000 MAX ST 1000 units Tabs   Generic drug:  cholecalciferol      Take 2,000 Units by mouth every morning

## 2018-09-05 NOTE — LETTER
"2018      RE: Suzy Rodríguez  [address]  [facility] [address]       [facility] PHYSICIANS  SPECIALIZING IN BREAKTHROUGHS  Radiation Oncology    On Treatment Visit Note      Suzy Rodríguez      Date: 2018   MRN: [id number]   : 1985  Diagnosis: adrenal carcinoma      Reason for Visit:  On Radiation Treatment Visit     Treatment Summary to Date  Treatment Site: abdomen Current Dose: 1260/5400 cGy Fractions:            Subjective:  Worsening nausea and reduced appetite this week. Taking Zofran q4 hrs instead of recommended q8hrs.   Loose stool resolved since last week, more constipation this week. Fatigued. On mitotane. Chest wall pain last week resolved. No lightheadedness. Reduced PO intake overall due to nausea and appetite change.      Nursing ROS:   Nutrition Alteration  Diet Type: Patient's Preference  Nutrition Note: recent diet change has helped bowels  Skin  Skin Reaction: 0 - No changes        Cardiovascular  Respiratory effort: 1 - Normal - without distress  Gastrointestinal  Nausea: 1 - One to two episodes of nausea/24  Diarrhea: 1 - Abdominal cramping, two or less soft or liquid bowel movements  GI Note: nausea 1x/day-takes zofran, bowels loose since starting chemo pill 1 month ago, takes imodium in the morning to manage bowels  Genitourinary  Urinary Status: 0 - Normal     Pain Assessment  0-10 Pain Scale: 0  Pain Note: right \"side cramps\" yesterday and today      Objective:   /75  Pulse 79  Resp 18  Wt 68.5 kg (151 lb)  SpO2 100%  BMI 25.92 kg/m2  No apparent distress. Fatigued.  Abdomen soft, nontender.    Labs:  CBC RESULTS:   Recent Labs   Lab Test  18   1410   WBC  7.4   RBC  4.68   HGB  9.9*   HCT  35.9   MCV  77*   MCH  21.2*   MCHC  27.6*   RDW  16.4*   PLT  375     ELECTROLYTES:  Recent Labs   Lab Test  18   1410   NA  138   POTASSIUM  4.0   CHLORIDE  102   SHASHA  8.9   CO2  29   BUN  4*   CR  0.59   GLC  89       Assessment:  32F with " right adrenal gland carcinoma s/p R2 resection receiving adjuvant radiation. Tolerating treatment. More nausea this week, not controlled on current anti-emetic regimen. All questions and concerns addressed.    Plan:   1. Continue current therapy.    2.  Nausea worse this week despite using Zofran ATC. Will plan to use combination anti-emetic therapy. Zofran q8h ATC and compazine q6hrs PRN in between.  Will continue to monitor. If GI symptoms worsen, would discuss with Dr. Cintron and consider holding mitotane during RT to limit side effects.  3. Hydration and activity for constipation. Should improve with reduced Zofran intake.    Mosaiq chart and setup information reviewed  Ports checked    Medication Review  Med list reviewed with patient?: Yes           Cesar Monsivais MD

## 2018-09-05 NOTE — PROGRESS NOTES
"[facility] PHYSICIANS  SPECIALIZING IN BREAKTHROUGHS  Radiation Oncology    On Treatment Visit Note      Suzy Rodríguez      Date: 2018   MRN: [id number]   : 1985  Diagnosis: adrenal carcinoma      Reason for Visit:  On Radiation Treatment Visit     Treatment Summary to Date  Treatment Site: abdomen Current Dose: 1260/5400 cGy Fractions:            Subjective:  Worsening nausea and reduced appetite this week. Taking Zofran q4 hrs instead of recommended q8hrs.   Loose stool resolved since last week, more constipation this week. Fatigued. On mitotane. Chest wall pain last week resolved. No lightheadedness. Reduced PO intake overall due to nausea and appetite change.      Nursing ROS:   Nutrition Alteration  Diet Type: Patient's Preference  Nutrition Note: recent diet change has helped bowels  Skin  Skin Reaction: 0 - No changes        Cardiovascular  Respiratory effort: 1 - Normal - without distress  Gastrointestinal  Nausea: 1 - One to two episodes of nausea/24  Diarrhea: 1 - Abdominal cramping, two or less soft or liquid bowel movements  GI Note: nausea 1x/day-takes zofran, bowels loose since starting chemo pill 1 month ago, takes imodium in the morning to manage bowels  Genitourinary  Urinary Status: 0 - Normal     Pain Assessment  0-10 Pain Scale: 0  Pain Note: right \"side cramps\" yesterday and today      Objective:   /75  Pulse 79  Resp 18  Wt 68.5 kg (151 lb)  SpO2 100%  BMI 25.92 kg/m2  No apparent distress. Fatigued.  Abdomen soft, nontender.    Labs:  CBC RESULTS:   Recent Labs   Lab Test  18   1410   WBC  7.4   RBC  4.68   HGB  9.9*   HCT  35.9   MCV  77*   MCH  21.2*   MCHC  27.6*   RDW  16.4*   PLT  375     ELECTROLYTES:  Recent Labs   Lab Test  18   1410   NA  138   POTASSIUM  4.0   CHLORIDE  102   SHASHA  8.9   CO2  29   BUN  4*   CR  0.59   GLC  89       Assessment:  32F with right adrenal gland carcinoma s/p R2 resection receiving adjuvant radiation. " Tolerating treatment. More nausea this week, not controlled on current anti-emetic regimen. All questions and concerns addressed.    Plan:   1. Continue current therapy.    2.  Nausea worse this week despite using Zofran ATC. Will plan to use combination anti-emetic therapy. Zofran q8h ATC and compazine q6hrs PRN in between.  Will continue to monitor. If GI symptoms worsen, would discuss with Dr. Cintron and consider holding mitotane during RT to limit side effects.  3. Hydration and activity for constipation. Should improve with reduced Zofran intake.    Mosaiq chart and setup information reviewed  Ports checked    Medication Review  Med list reviewed with patient?: Yes           Cesar Monsivais MD

## 2018-09-06 ENCOUNTER — APPOINTMENT (OUTPATIENT)
Dept: RADIATION THERAPY | Facility: OUTPATIENT CENTER | Age: 33
End: 2018-09-06
Payer: COMMERCIAL

## 2018-09-07 ENCOUNTER — APPOINTMENT (OUTPATIENT)
Dept: RADIATION THERAPY | Facility: OUTPATIENT CENTER | Age: 33
End: 2018-09-07
Payer: COMMERCIAL

## 2018-09-10 ENCOUNTER — INFUSION THERAPY VISIT (OUTPATIENT)
Dept: INFUSION THERAPY | Facility: CLINIC | Age: 33
End: 2018-09-10
Attending: RADIOLOGY
Payer: COMMERCIAL

## 2018-09-10 ENCOUNTER — APPOINTMENT (OUTPATIENT)
Dept: RADIATION THERAPY | Facility: OUTPATIENT CENTER | Age: 33
End: 2018-09-10
Payer: COMMERCIAL

## 2018-09-10 ENCOUNTER — TELEPHONE (OUTPATIENT)
Dept: ONCOLOGY | Facility: CLINIC | Age: 33
End: 2018-09-10

## 2018-09-10 VITALS — TEMPERATURE: 96 F | HEART RATE: 69 BPM | SYSTOLIC BLOOD PRESSURE: 118 MMHG | DIASTOLIC BLOOD PRESSURE: 67 MMHG

## 2018-09-10 DIAGNOSIS — R11.2 NAUSEA WITH VOMITING: ICD-10-CM

## 2018-09-10 DIAGNOSIS — E27.8 ADRENAL MASS (H): ICD-10-CM

## 2018-09-10 DIAGNOSIS — E86.0 DEHYDRATION: ICD-10-CM

## 2018-09-10 DIAGNOSIS — E86.0 DEHYDRATION: Primary | ICD-10-CM

## 2018-09-10 DIAGNOSIS — C74.91 MALIGNANT NEOPLASM OF ADRENAL GLAND, RIGHT (H): Primary | ICD-10-CM

## 2018-09-10 PROCEDURE — 96360 HYDRATION IV INFUSION INIT: CPT

## 2018-09-10 PROCEDURE — 25000128 H RX IP 250 OP 636: Performed by: RADIOLOGY

## 2018-09-10 RX ORDER — LORAZEPAM 1 MG/1
1 TABLET ORAL EVERY 6 HOURS PRN
Qty: 60 TABLET | Refills: 1 | Status: SHIPPED | OUTPATIENT
Start: 2018-09-10 | End: 2018-10-03

## 2018-09-10 RX ADMIN — SODIUM CHLORIDE 1000 ML: 9 INJECTION, SOLUTION INTRAVENOUS at 14:18

## 2018-09-10 NOTE — MR AVS SNAPSHOT
After Visit Summary   9/10/2018    Suzy Rodríguez    MRN: [id number]           Patient Information     Date Of Birth          1985        Visit Information        Provider Department      9/10/2018 2:00 PM ROOM 5 [facility]        Today's Diagnoses     Dehydration    -  1    Nausea with vomiting        Adrenal mass (H)           Follow-ups after your visit        Your next 10 appointments already scheduled     Sep 11, 2018 10:00 AM CDT   Linear Accelerator with Lr p Rad Tech   [facility] ([facility])    [address], [address]  [facility] [address]   [phone]            Sep 12, 2018  9:45 AM CDT   Linear Accelerator with Lr p Rad Tech   [facility] ([facility])    [address], [address]  [facility] [address]   [phone]            Sep 12, 2018 10:00 AM CDT   ON TREATMENT VISIT with Cesar Monsivais MD   [facility] ([facility])    [address], [address]  [facility] [address]   [phone]            Sep 13, 2018  9:45 AM CDT   Linear Accelerator with Lr p Rad Tech   [facility] ([facility])    [address], [address]  [facility] [address]   [phone]            Sep 14, 2018  9:45 AM CDT   Linear Accelerator with Lr p Rad Tech   [facility] ([facility])    [address], [address]  [facility] [address]   [phone]            Sep 14, 2018  1:30 PM CDT   Level 1 with ROOM 3 [facility] ([facility])    [facility]  [address]  [facility] [address]   [phone]            Sep 17, 2018  9:45 AM CDT   Linear Accelerator with Lr Ump Rad Tech   [facility] ([facility])    [address], [address]  [facility] [address]   [phone]            Sep 18, 2018  9:45 AM CDT   Linear Accelerator with Lr p Rad Tech    [facility] Therapy Center ([facility])    [address], [address]  [facility] [address]   [phone]            Sep 19, 2018  9:45 AM CDT   Linear Accelerator with Lr [facility]   [facility] ([facility])    [address], [address]  [facility] [address]   [phone]            Sep 19, 2018  1:30 PM CDT   (Arrive by 1:15 PM)   RETURN ENDOCRINE with Roel Beverly MD   [facility] End[facility]ology ([facility])    [address] [address] [phone]              Who to contact     If you have questions or need follow up information about today's clinic visit or your schedule please contact [facility] directly at [phone].  Normal or non-critical lab and imaging results will be communicated to you by MyChart, letter or phone within 4 business days after the clinic has received the results. If you do not hear from us within 7 days, please contact the clinic through [employer] or phone. If you have a critical or abnormal lab result, we will notify you by phone as soon as possible.  Submit refill requests through [employer] [facility] or call your pharmacy and they will forward the refill request to us. Please allow 3 business days for your refill to be completed.          Additional Information About Your Visit        MyChart Information     [employer] gives you secure access to your electronic health record. If you see a primary care provider, you can also send messages to your care team and make appointments. If you have questions, please call your primary care clinic.  If you do not have a primary care provider, please call [phone] and they will assist you.        Care EveryWhere ID     This is your Care EveryWhere ID. This could be used by other organizations to access your [place] medical records  [id number]        Your Vitals Were     Pulse Temperature                69 96  F (35.6  C) (Oral)            Blood Pressure from Last 3 Encounters:   09/10/18 118/67   09/05/18 109/75   08/29/18 122/81    Weight from Last 3 Encounters:   09/05/18 68.5 kg (151 lb)   08/29/18 71.3 kg (157 lb 3.2 oz)   08/23/18 71.7 kg (158 lb)              Today, you had the following     No orders found for display         Today's Medication Changes          These changes are accurate as of 9/10/18  3:24 PM.  If you have any questions, ask your nurse or doctor.               These medicines have changed or have updated prescriptions.        Dose/Directions    * LORazepam 0.5 MG tablet   Commonly known as:  ATIVAN   This may have changed:  Another medication with the same name was added. Make sure you understand how and when to take each.   Used for:  Adrenal mass (H), Anxiety   Changed by:  Cesar Monsivais MD        Dose:  0.5 mg   Take 1 tablet (0.5 mg) by mouth every 8 hours as needed for anxiety   Quantity:  30 tablet   Refills:  0       * LORazepam 1 MG tablet   Commonly known as:  ATIVAN   This may have changed:  You were already taking a medication with the same name, and this prescription was added. Make sure you understand how and when to take each.   Used for:  Malignant neoplasm of adrenal gland, right (H)   Changed by:  Cesar Monsivais MD        Dose:  1 mg   Take 1 tablet (1 mg) by mouth every 6 hours as needed for nausea or vomiting   Quantity:  60 tablet   Refills:  1       * Notice:  This list has 2 medication(s) that are the same as other medications prescribed for you. Read the directions carefully, and ask your doctor or other care provider to review them with you.         Where to get your medicines      Some of these will need a paper prescription and others can be bought over the counter.  Ask your nurse if you have questions.     Bring a paper prescription for each of these medications     LORazepam 1 MG tablet                Primary Care Provider Office Phone # Fax #    [facility]  [phone]       [address] [place] [address]        Equal Access to Services     MARQUITA VALADEZ : Hadii aad ku hadjadynyoli [place], wajavida adielkamarha, katerinta ruilarissajohnnie albarranchrisjohnnie, coleman reyesarleygigi young. So [facility] [phone].    ATENCIÓN: Si habla español, tiene a lyman disposición servicios gratuitos de asistencia lingüística. Jemalame al [phone].    We comply with applicable federal civil rights laws and [place] laws. We do not discriminate on the basis of race, color, national origin, age, disability, sex, sexual orientation, or gender identity.            Thank you!     Thank you for choosing [facility]  for your care. Our goal is always to provide you with excellent care. Hearing back from our patients is one way we can continue to improve our services. Please take a few minutes to complete the written survey that you may receive in the mail after your visit with us. Thank you!             Your Updated Medication List - Protect others around you: Learn how to safely use, store and throw away your medicines at www.disposemymeds.org.          This list is accurate as of 9/10/18  3:24 PM.  Always use your most recent med list.                   Brand Name Dispense Instructions for use Diagnosis    acetaminophen 325 MG tablet    TYLENOL    150 tablet    Take 2 tablets (650 mg) by mouth every 4 hours as needed for other (multimodal surgical pain management along with NSAIDS and opioid medication as indicated based on pain control and physical function.)    Adrenal mass, right (H), Acute post-operative pain       buPROPion 100 MG tablet    WELLBUTRIN     TAKE 1 TABLET (100 MG TOTAL) BY MOUTH 2 (TWO) TIMES A DAY.        calcium carbonate antacid 1000 MG Chew      Take 1 tablet by mouth every morning        enoxaparin 40 MG/0.4ML injection    LOVENOX    14 Syringe    Inject 0.4 mLs (40 mg) Subcutaneous daily    Adrenal mass, right (H), H/O MTHFR mutation        EPINEPHrine 0.3 MG/0.3ML injection 2-pack    EPIPEN/ADRENACLICK/or ANY BX GENERIC EQUIV     As directed for bee stings        ferrous sulfate 325 (65 Fe) MG tablet    IRON     Take 325 mg by mouth daily        FLUoxetine 20 MG capsule    PROzac     Take 20 mg by mouth every morning        gabapentin 100 MG capsule    NEURONTIN     Take 100 mg by mouth        hydrocortisone 10 MG tablet    CORTEF    150 tablet    Take 3 pills (=30 mg) in AM and 2 pills (=20 mg) at 2 pm daily.        HYDROCORTISONE EX      Inject 100 mg into the muscle as needed        hydrocortisone sodium succinate  MG injection    solu-CORTEF    2 mL    Inject 2 mLs (100 mg) into the muscle once for 1 dose Use in emergency situations as discussed in clinic.    Secondary adrenal insufficiency (H)       Lidocaine 4 % Patch    LIDOCARE    14 patch    Place 2 patches onto the skin every 24 hours    Adrenal mass, right (H), Acute post-operative pain       * LORazepam 0.5 MG tablet    ATIVAN    30 tablet    Take 1 tablet (0.5 mg) by mouth every 8 hours as needed for anxiety    Adrenal mass (H), Anxiety       * LORazepam 1 MG tablet    ATIVAN    60 tablet    Take 1 tablet (1 mg) by mouth every 6 hours as needed for nausea or vomiting    Malignant neoplasm of adrenal gland, right (H)       mitotane 500 MG tablet CHEMO    LYSODREN    120 tablet    Take 1 gram at 7 am and then 1 gram at 3 pm. No doses at night because of insomnia and other side effects. Take nausea med before each dose.    Adrenal cortex cancer, right (H)       MULTIvitamin  S Caps      Take 1 capsule by mouth        ondansetron 8 MG tablet    ZOFRAN    30 tablet    Take 1 tablet (8 mg) by mouth every 8 hours as needed for nausea    Adrenal cortex cancer, right (H)       ONE-A-DAY WOMENS PO      Take 2 tablets by mouth every morning        oxyCODONE IR 10 MG tablet    ROXICODONE    22 tablet    Take 1 tablet (10 mg) by mouth every 4 hours as needed for moderate to severe pain     Carcinoma, adrenal cortical (H)       oxyCODONE-acetaminophen 5-325 MG per tablet    PERCOCET    12 tablet    Take 1-2 tablets by mouth every 4 hours as needed for pain        prochlorperazine 10 MG tablet    COMPAZINE    90 tablet    Take 1 tablet (10 mg) by mouth every 6 hours as needed for nausea or vomiting    Nausea       VITAMIN D-1000 MAX ST 1000 units Tabs   Generic drug:  cholecalciferol      Take 2,000 Units by mouth every morning        * Notice:  This list has 2 medication(s) that are the same as other medications prescribed for you. Read the directions carefully, and ask your doctor or other care provider to review them with you.

## 2018-09-10 NOTE — TELEPHONE ENCOUNTER
Oral Chemotherapy Monitoring Program (Left Voicemail)      Primary Oncologist: Dr. Cintron  Primary Oncology Clinic: [facility]  Cancer Diagnosis: Adrenal Cancer     Attempted to contact patient today for follow up regarding oral chemotherapy, Mitotane, for normal assessment. No answer. Left voicemail for patient to please call me back at [phone] when able. No patient or medication names were mentioned while leaving this message.    Deandra Ochoa   Pharmacy Intern  [facility]   [phone]

## 2018-09-11 ENCOUNTER — APPOINTMENT (OUTPATIENT)
Dept: RADIATION THERAPY | Facility: OUTPATIENT CENTER | Age: 33
End: 2018-09-11
Payer: COMMERCIAL

## 2018-09-12 ENCOUNTER — OFFICE VISIT (OUTPATIENT)
Dept: RADIATION THERAPY | Facility: OUTPATIENT CENTER | Age: 33
End: 2018-09-12
Payer: COMMERCIAL

## 2018-09-12 ENCOUNTER — APPOINTMENT (OUTPATIENT)
Dept: RADIATION THERAPY | Facility: OUTPATIENT CENTER | Age: 33
End: 2018-09-12
Payer: COMMERCIAL

## 2018-09-12 ENCOUNTER — RECORDS - HEALTHEAST (OUTPATIENT)
Dept: ADMINISTRATIVE | Facility: OTHER | Age: 33
End: 2018-09-12

## 2018-09-12 VITALS
WEIGHT: 156 LBS | HEART RATE: 78 BPM | OXYGEN SATURATION: 100 % | DIASTOLIC BLOOD PRESSURE: 81 MMHG | BODY MASS INDEX: 26.78 KG/M2 | SYSTOLIC BLOOD PRESSURE: 113 MMHG | RESPIRATION RATE: 18 BRPM

## 2018-09-12 DIAGNOSIS — C74.01 ADRENAL CORTEX CANCER, RIGHT (H): Primary | ICD-10-CM

## 2018-09-12 ASSESSMENT — PAIN SCALES - GENERAL: PAINLEVEL: NO PAIN (0)

## 2018-09-12 NOTE — PROGRESS NOTES
"[facility] PHYSICIANS  SPECIALIZING IN BREAKTHROUGHS  Radiation Oncology    On Treatment Visit Note      Suzy Rodríguez      Date: 2018   MRN: [id number]   : 1985  Diagnosis: adrenal carcinoma      Reason for Visit:  On Radiation Treatment Visit     Treatment Summary to Date  Treatment Site: abdomen Current Dose: 2160/5400 cGy Fractions:            Subjective:  Worsening nausea and reduced appetite this week earlier this week.  Discussed with Dr. Cintron ([facility]) possible reduction in mitotane dose concurrently with RT. Dose not reduced yet as patient's nausea is better controlled. She increased her steroid level (per Endocrinology) which has helped alleviate her symptoms. Patient is receiving IVF this week, has helped. No lightheadedness today. Overall feeling better today than earlier this week. Tolerating full PO diet yesterday. Taking anti-emetic Ativan and Compazine ATC.       Nursing ROS:   Nutrition Alteration  Diet Type: Patient's Preference  Nutrition Note: recent diet change has helped bowels  Skin  Skin Reaction: 0 - No changes        Cardiovascular  Respiratory effort: 1 - Normal - without distress  Gastrointestinal  Nausea: 1 - One to two episodes of nausea/24  Diarrhea: 1 - Abdominal cramping, two or less soft or liquid bowel movements  GI Note: nausea 1x/day-takes zofran, bowels loose since starting chemo pill 1 month ago, takes imodium in the morning to manage bowels  Genitourinary  Urinary Status: 0 - Normal     Pain Assessment  0-10 Pain Scale: 0  Pain Note: right \"side cramps\" yesterday and today      Objective:   There were no vitals taken for this visit.    No apparent distress. Fatigued.  Abdomen soft, nontender.    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 Tolerating treatment. More nausea this week, not controlled on current anti-emetic regimen. All questions and concerns addressed.    Plan:   1. Continue current therapy.    2. Nausea better controlled since increasing steroid dose. Has Compazine and Ativan ATC.    3. Scheduled IVF to help patient keep well hydrated due to overall poor appetite and nausea, although this is improving.   4. Continued on 2 grams daily of mitotane currently. Have discussed dose reduction with Dr. Cintron if symptoms worsen.    Mosaiq chart and setup information reviewed  Ports checked    Medication Review  Med list reviewed with patient?: Yes           Cesar Monsivais MD

## 2018-09-12 NOTE — LETTER
"2018      RE: Suzy Rodríguez  [address]  [facility] [address]       [facility] PHYSICIANS  SPECIALIZING IN BREAKTHROUGHS  Radiation Oncology    On Treatment Visit Note      Suzy Rodríguez      Date: 2018   MRN: [id number]   : 1985  Diagnosis: adrenal carcinoma      Reason for Visit:  On Radiation Treatment Visit     Treatment Summary to Date  Treatment Site: abdomen Current Dose: 2160/5400 cGy Fractions:            Subjective:  Worsening nausea and reduced appetite this week earlier this week.  Discussed with Dr. Cintron ([facility]) possible reduction in mitotane dose concurrently with RT. Dose not reduced yet as patient's nausea is better controlled. She increased her steroid level (per Endocrinology) which has helped alleviate her symptoms. Patient is receiving IVF this week, has helped. No lightheadedness today. Overall feeling better today than earlier this week. Tolerating full PO diet yesterday. Taking anti-emetic Ativan and Compazine ATC.       Nursing ROS:   Nutrition Alteration  Diet Type: Patient's Preference  Nutrition Note: recent diet change has helped bowels  Skin  Skin Reaction: 0 - No changes        Cardiovascular  Respiratory effort: 1 - Normal - without distress  Gastrointestinal  Nausea: 1 - One to two episodes of nausea/24  Diarrhea: 1 - Abdominal cramping, two or less soft or liquid bowel movements  GI Note: nausea 1x/day-takes zofran, bowels loose since starting chemo pill 1 month ago, takes imodium in the morning to manage bowels  Genitourinary  Urinary Status: 0 - Normal     Pain Assessment  0-10 Pain Scale: 0  Pain Note: right \"side cramps\" yesterday and today      Objective:   There were no vitals taken for this visit.    No apparent distress. Fatigued.  Abdomen soft, nontender.    Labs:  CBC RESULTS:   Recent Labs   Lab Test  18   1410   WBC  7.4   RBC  4.68   HGB  9.9*   HCT  35.9   MCV  77*   MCH  21.2*   MCHC  27.6*   RDW  16.4*   PLT  375 "     ELECTROLYTES:  Recent Labs   Lab Test  08/20/18   1410   NA  138   POTASSIUM  4.0   CHLORIDE  102   SHASHA  8.9   CO2  29   BUN  4*   CR  0.59   GLC  89       Assessment:  32F with right adrenal gland carcinoma s/p R2 resection receiving adjuvant radiation. Tolerating treatment. More nausea this week, not controlled on current anti-emetic regimen. All questions and concerns addressed.    Plan:   1. Continue current therapy.    2. Nausea better controlled since increasing steroid dose. Has Compazine and Ativan ATC.    3. Scheduled IVF to help patient keep well hydrated due to overall poor appetite and nausea, although this is improving.   4. Continued on 2 grams daily of mitotane currently. Have discussed dose reduction with Dr. Cintron if symptoms worsen.    Mosaiq chart and setup information reviewed  Ports checked    Medication Review  Med list reviewed with patient?: Yes       Cesar Monsivais MD

## 2018-09-12 NOTE — MR AVS SNAPSHOT
After Visit Summary   9/12/2018    Suzy Rodríguez    MRN: [id number]           Patient Information     Date Of Birth          1985        Visit Information        Provider Department      9/12/2018 10:00 AM Cesar Monsivais MD [facility]         Follow-ups after your visit        Your next 10 appointments already scheduled     Sep 13, 2018  9:45 AM CDT   Linear Accelerator with Lr [facility] Rad Tech   [facility] ([facility])    [address], [address]  [facility] [address]   [phone]            Sep 14, 2018  9:45 AM CDT   Linear Accelerator with Lr [facility] Rad Tech   [facility] ([facility])    [address], [address]  [place] [address]   [phone]            Sep 14, 2018  1:30 PM CDT   Level 1 with ROOM 3 [facility] Cancer [facility]fusion ([facility])    [facility]  [address]  [facility] [address]   [phone]            Sep 17, 2018  9:45 AM CDT   Linear Accelerator with Lr [facility] Rad Tech   [facility] ([facility])    [address], [address]  [place] [address]   [phone]            Sep 18, 2018  9:45 AM CDT   Linear Accelerator with Lr [facility] Rad Tech   [facility] ([facility])    [address], [address]  [place] [address]   [phone]            Sep 19, 2018  9:45 AM CDT   Linear Accelerator with Lr [facility] Rad Tech   [facility] ([facility])    [address], [address]  [place] [address]   [phone]            Sep 19, 2018 10:00 AM CDT   ON TREATMENT VISIT with Cesar Monsivais MD   [facility] ([facility])    [address], [address]  [facility] [address]   [phone]            Sep 19, 2018 12:15 PM CDT   Masonic Lab Draw with  MASONIC LAB DRAW   [facility] Masonic Lab Draw ([facility])    [address]  [address]  [facility] [address]   [phone]            Sep 19, 2018 12:45 PM CDT   (Arrive by 12:30 PM)   Return Visit with Benson Cintron MD   [facility] ([facility])    [address]  [address]  [facility] [place] [facility] [address]   [phone]            Sep 19, 2018  1:30 PM CDT   (Arrive by 1:15 PM)   RETURN ENDOCRINE with Roel Beverly MD   [facility] Endocrinology ([facility])    [address]  3rd Floor  [facility] [address] [phone]              Who to contact     Please call your clinic at [phone] to:    Ask questions about your health    Make or cancel appointments    Discuss your medicines    Learn about your test results    Speak to your doctor            Additional Information About Your Visit        ApprityharRoadtrippers Information     MyWishBoard gives you secure access to your electronic health record. If you see a primary care provider, you can also send messages to your care team and make appointments. If you have questions, please call your primary care clinic.  If you do not have a primary care provider, please call [phone] and they will assist you.      MyWishBoard is an electronic gateway that provides easy, online access to your medical records. With MyWishBoard, you can request a clinic appointment, read your test results, renew a prescription or communicate with your care team.     To access your existing account, please contact your [facility] or call [phone] for assistance.        Care EveryWhere ID     This is your Care EveryWhere ID. This could be used by other organizations to access your [place] medical records  [id number]        Your Vitals Were     Pulse Respirations Pulse Oximetry BMI (Body Mass Index)          78 18 100% 26.78 kg/m2         Blood Pressure from Last 3 Encounters:   09/12/18 113/81   09/10/18 118/67   09/05/18 109/75    Weight from Last 3  Encounters:   09/12/18 70.8 kg (156 lb)   09/05/18 68.5 kg (151 lb)   08/29/18 71.3 kg (157 lb 3.2 oz)              Today, you had the following     No orders found for display         Today's Medication Changes          These changes are accurate as of 9/12/18 10:15 AM.  If you have any questions, ask your nurse or doctor.               These medicines have changed or have updated prescriptions.        Dose/Directions    hydrocortisone 10 MG tablet   Commonly known as:  CORTEF   This may have changed:  additional instructions        Take 3 pills (=30 mg) in AM and 2 pills (=20 mg) at 2 pm daily.   Quantity:  150 tablet   Refills:  11                Primary Care Provider Office Phone # Fax #    [facility] [phone]       [address] [place] [address]        Equal Access to Services     MARQUITA VLAADEZ : Maury Redding, marcela siddiqi, rod koehler, coleman young. So [facility] [phone].    ATENCIÓN: Si habla español, tiene a lyman disposición servicios gratuitos de asistencia lingüística. [facility] [phone].    We comply with applicable federal civil rights laws and [place] laws. We do not discriminate on the basis of race, color, national origin, age, disability, sex, sexual orientation, or gender identity.            Thank you!     Thank you for choosing [facility]  for your care. Our goal is always to provide you with excellent care. Hearing back from our patients is one way we can continue to improve our services. Please take a few minutes to complete the written survey that you may receive in the mail after your visit with us. Thank you!             Your Updated Medication List - Protect others around you: Learn how to safely use, store and throw away your medicines at www.disposemymeds.org.          This list is accurate as of 9/12/18 10:15 AM.  Always use your most recent med list.                    Brand Name Dispense Instructions for use Diagnosis    acetaminophen 325 MG tablet    TYLENOL    150 tablet    Take 2 tablets (650 mg) by mouth every 4 hours as needed for other (multimodal surgical pain management along with NSAIDS and opioid medication as indicated based on pain control and physical function.)    Adrenal mass, right (H), Acute post-operative pain       buPROPion 100 MG tablet    WELLBUTRIN     TAKE 1 TABLET (100 MG TOTAL) BY MOUTH 2 (TWO) TIMES A DAY.        calcium carbonate antacid 1000 MG Chew      Take 1 tablet by mouth every morning        enoxaparin 40 MG/0.4ML injection    LOVENOX    14 Syringe    Inject 0.4 mLs (40 mg) Subcutaneous daily    Adrenal mass, right (H), H/O MTHFR mutation       EPINEPHrine 0.3 MG/0.3ML injection 2-pack    EPIPEN/ADRENACLICK/or ANY BX GENERIC EQUIV     As directed for bee stings        ferrous sulfate 325 (65 Fe) MG tablet    IRON     Take 325 mg by mouth daily        FLUoxetine 20 MG capsule    PROzac     Take 20 mg by mouth every morning        gabapentin 100 MG capsule    NEURONTIN     Take 100 mg by mouth        hydrocortisone 10 MG tablet    CORTEF    150 tablet    Take 3 pills (=30 mg) in AM and 2 pills (=20 mg) at 2 pm daily.        HYDROCORTISONE EX      Inject 100 mg into the muscle as needed        hydrocortisone sodium succinate  MG injection    solu-CORTEF    2 mL    Inject 2 mLs (100 mg) into the muscle once for 1 dose Use in emergency situations as discussed in clinic.    Secondary adrenal insufficiency (H)       Lidocaine 4 % Patch    LIDOCARE    14 patch    Place 2 patches onto the skin every 24 hours    Adrenal mass, right (H), Acute post-operative pain       * LORazepam 0.5 MG tablet    ATIVAN    30 tablet    Take 1 tablet (0.5 mg) by mouth every 8 hours as needed for anxiety    Adrenal mass (H), Anxiety       * LORazepam 1 MG tablet    ATIVAN    60 tablet    Take 1 tablet (1 mg) by mouth every 6 hours as needed for nausea or  vomiting    Malignant neoplasm of adrenal gland, right (H)       mitotane 500 MG tablet CHEMO    LYSODREN    120 tablet    Take 1 gram at 7 am and then 1 gram at 3 pm. No doses at night because of insomnia and other side effects. Take nausea med before each dose.    Adrenal cortex cancer, right (H)       MULTIvitamin  S Caps      Take 1 capsule by mouth        ondansetron 8 MG tablet    ZOFRAN    30 tablet    Take 1 tablet (8 mg) by mouth every 8 hours as needed for nausea    Adrenal cortex cancer, right (H)       ONE-A-DAY WOMENS PO      Take 2 tablets by mouth every morning        oxyCODONE IR 10 MG tablet    ROXICODONE    22 tablet    Take 1 tablet (10 mg) by mouth every 4 hours as needed for moderate to severe pain    Carcinoma, adrenal cortical (H)       oxyCODONE-acetaminophen 5-325 MG per tablet    PERCOCET    12 tablet    Take 1-2 tablets by mouth every 4 hours as needed for pain        prochlorperazine 10 MG tablet    COMPAZINE    90 tablet    Take 1 tablet (10 mg) by mouth every 6 hours as needed for nausea or vomiting    Nausea       VITAMIN D-1000 MAX ST 1000 units Tabs   Generic drug:  cholecalciferol      Take 2,000 Units by mouth every morning        * Notice:  This list has 2 medication(s) that are the same as other medications prescribed for you. Read the directions carefully, and ask your doctor or other care provider to review them with you.

## 2018-09-13 ENCOUNTER — APPOINTMENT (OUTPATIENT)
Dept: RADIATION THERAPY | Facility: OUTPATIENT CENTER | Age: 33
End: 2018-09-13
Payer: COMMERCIAL

## 2018-09-14 ENCOUNTER — APPOINTMENT (OUTPATIENT)
Dept: RADIATION THERAPY | Facility: OUTPATIENT CENTER | Age: 33
End: 2018-09-14
Payer: COMMERCIAL

## 2018-09-14 ENCOUNTER — INFUSION THERAPY VISIT (OUTPATIENT)
Dept: INFUSION THERAPY | Facility: CLINIC | Age: 33
End: 2018-09-14
Attending: RADIOLOGY
Payer: COMMERCIAL

## 2018-09-14 VITALS — TEMPERATURE: 97.5 F | DIASTOLIC BLOOD PRESSURE: 56 MMHG | HEART RATE: 90 BPM | SYSTOLIC BLOOD PRESSURE: 107 MMHG

## 2018-09-14 DIAGNOSIS — R11.2 NAUSEA WITH VOMITING: ICD-10-CM

## 2018-09-14 DIAGNOSIS — E27.8 ADRENAL MASS (H): ICD-10-CM

## 2018-09-14 DIAGNOSIS — E86.0 DEHYDRATION: Primary | ICD-10-CM

## 2018-09-14 PROCEDURE — 96361 HYDRATE IV INFUSION ADD-ON: CPT

## 2018-09-14 PROCEDURE — 96360 HYDRATION IV INFUSION INIT: CPT

## 2018-09-14 PROCEDURE — 25000128 H RX IP 250 OP 636: Performed by: RADIOLOGY

## 2018-09-14 RX ADMIN — SODIUM CHLORIDE 2000 ML: 9 INJECTION, SOLUTION INTRAVENOUS at 14:00

## 2018-09-14 NOTE — MR AVS SNAPSHOT
After Visit Summary   9/14/2018    Suzy Rodríguez    MRN: [id number]           Patient Information     Date Of Birth          1985        Visit Information        Provider Department      9/14/2018 1:30 PM ROOM 3 [facility] Cancer [facility]fusion        Today's Diagnoses     Dehydration    -  1    Nausea with vomiting        Adrenal mass (H)           Follow-ups after your visit        Your next 10 appointments already scheduled     Sep 17, 2018  9:45 AM CDT   Linear Accelerator with Lr [facility] Rad [facility]   [facility] ([facility])    [address], [address]  [facility] [address]   [phone]            Sep 18, 2018  9:45 AM CDT   Linear Accelerator with Lr [facility] Rad Tech   [facility] ([facility])    [address]  [facility] [address]   [phone]            Sep 19, 2018  9:45 AM CDT   Linear Accelerator with Lr [facility] Rad Tech   [facility] ([facility])    [address]  [facility] [address]   [phone]            Sep 19, 2018 10:00 AM CDT   ON TREATMENT VISIT with Cesar Monsivais MD   [facility] ([facility])    [address]  [facility] [address]   [phone]            Sep 19, 2018 12:15 PM CDT   Masonic Lab Draw with  MASONIC LAB DRAW   [facility]    [address]  [address]  [place] [address] [phone]            Sep 19, 2018 12:45 PM CDT   (Arrive by 12:30 PM)   Return Visit with Benson Cintron MD   [facility]    [address]  [address]  [place] [address] [phone]            Sep 19, 2018  1:30 PM CDT   (Arrive by 1:15 PM)   RETURN ENDOCRINE with Roel Beverly MD   [facility] Endocrinology ([facility])    [address]  3rd Floor  [place] [facility] [address]    [phone]            Sep 20, 2018  9:45 AM CDT   Linear Accelerator with Lr [facility] Rad [facility]   [facility] ([facility])    [address]  [facility] [address]   [phone]            Sep 21, 2018  9:45 AM CDT   Linear Accelerator with Lr [facility] Rad [facility]   [facility] ([facility])    [address]  [facility] [address]   [phone]            Sep 24, 2018  9:45 AM CDT   Linear Accelerator with Lr [facility] Rad [facility]   [facility] ([facility])    [address]  [facility] [address]   [phone]              Who to contact     If you have questions or need follow up information about today's clinic visit or your schedule please contact [facility] directly at [phone].  Normal or non-critical lab and imaging results will be communicated to you by New Travelcoohart, letter or phone within 4 business days after the clinic has received the results. If you do not hear from us within 7 days, please contact the clinic through OrderDynamicst or phone. If you have a critical or abnormal lab result, we will notify you by phone as soon as possible.  Submit refill requests through TIMPIK or call your pharmacy and they will forward the refill request to us. Please allow 3 business days for your refill to be completed.          Additional Information About Your Visit        New Travel[employer]art Information     TIMPIK gives you secure access to your electronic health record. If you see a primary care provider, you can also send messages to your care team and make appointments. If you have questions, please call your primary care clinic.  If you do not have a primary care provider, please call [phone] and they will assist you.        Care EveryWhere ID     This is your Care EveryWhere ID. This could be used by other organizations to access your [place] medical records  [id number]        Your Vitals Were     Pulse  Temperature                90 97.5  F (36.4  C) (Oral)           Blood Pressure from Last 3 Encounters:   09/14/18 107/56   09/12/18 113/81   09/10/18 118/67    Weight from Last 3 Encounters:   09/12/18 70.8 kg (156 lb)   09/05/18 68.5 kg (151 lb)   08/29/18 71.3 kg (157 lb 3.2 oz)              Today, you had the following     No orders found for display         Today's Medication Changes          These changes are accurate as of 9/14/18  4:17 PM.  If you have any questions, ask your nurse or doctor.               These medicines have changed or have updated prescriptions.        Dose/Directions    hydrocortisone 10 MG tablet   Commonly known as:  CORTEF   This may have changed:  additional instructions        Take 3 pills (=30 mg) in AM and 2 pills (=20 mg) at 2 pm daily.   Quantity:  150 tablet   Refills:  11                Primary Care Provider Office Phone # Fax #    [facility] [phone]       [facility] [id number]        Equal Access to Services     MARQUITA VALADEZ : Hadlea sorto Sobree, waaxda luqadaha, qaybta kaalmajohnnie koehler, coleman putnam . So [facility] [phone].    ATENCIÓN: Si habla español, tiene a lyman disposición servicios gratuitos de asistencia lingüística. Llame al [phone].    We comply with applicable federal civil rights laws and [place] laws. We do not discriminate on the basis of race, color, national origin, age, disability, sex, sexual orientation, or gender identity.            Thank you!     Thank you for choosing [facility]  for your care. Our goal is always to provide you with excellent care. Hearing back from our patients is one way we can continue to improve our services. Please take a few minutes to complete the written survey that you may receive in the mail after your visit with us. Thank you!             Your Updated Medication List - Protect others around you: Learn how to  safely use, store and throw away your medicines at www.disposemymeds.org.          This list is accurate as of 9/14/18  4:17 PM.  Always use your most recent med list.                   Brand Name Dispense Instructions for use Diagnosis    acetaminophen 325 MG tablet    TYLENOL    150 tablet    Take 2 tablets (650 mg) by mouth every 4 hours as needed for other (multimodal surgical pain management along with NSAIDS and opioid medication as indicated based on pain control and physical function.)    Adrenal mass, right (H), Acute post-operative pain       buPROPion 100 MG tablet    WELLBUTRIN     TAKE 1 TABLET (100 MG TOTAL) BY MOUTH 2 (TWO) TIMES A DAY.        calcium carbonate antacid 1000 MG Chew      Take 1 tablet by mouth every morning        enoxaparin 40 MG/0.4ML injection    LOVENOX    14 Syringe    Inject 0.4 mLs (40 mg) Subcutaneous daily    Adrenal mass, right (H), H/O MTHFR mutation       EPINEPHrine 0.3 MG/0.3ML injection 2-pack    EPIPEN/ADRENACLICK/or ANY BX GENERIC EQUIV     As directed for bee stings        ferrous sulfate 325 (65 Fe) MG tablet    IRON     Take 325 mg by mouth daily        FLUoxetine 20 MG capsule    PROzac     Take 20 mg by mouth every morning        gabapentin 100 MG capsule    NEURONTIN     Take 100 mg by mouth        hydrocortisone 10 MG tablet    CORTEF    150 tablet    Take 3 pills (=30 mg) in AM and 2 pills (=20 mg) at 2 pm daily.        HYDROCORTISONE EX      Inject 100 mg into the muscle as needed        hydrocortisone sodium succinate  MG injection    solu-CORTEF    2 mL    Inject 2 mLs (100 mg) into the muscle once for 1 dose Use in emergency situations as discussed in clinic.    Secondary adrenal insufficiency (H)       Lidocaine 4 % Patch    LIDOCARE    14 patch    Place 2 patches onto the skin every 24 hours    Adrenal mass, right (H), Acute post-operative pain       * LORazepam 0.5 MG tablet    ATIVAN    30 tablet    Take 1 tablet (0.5 mg) by mouth every 8 hours as  needed for anxiety    Adrenal mass (H), Anxiety       * LORazepam 1 MG tablet    ATIVAN    60 tablet    Take 1 tablet (1 mg) by mouth every 6 hours as needed for nausea or vomiting    Malignant neoplasm of adrenal gland, right (H)       mitotane 500 MG tablet CHEMO    LYSODREN    120 tablet    Take 1 gram at 7 am and then 1 gram at 3 pm. No doses at night because of insomnia and other side effects. Take nausea med before each dose.    Adrenal cortex cancer, right (H)       MULTIvitamin  S Caps      Take 1 capsule by mouth        ondansetron 8 MG tablet    ZOFRAN    30 tablet    Take 1 tablet (8 mg) by mouth every 8 hours as needed for nausea    Adrenal cortex cancer, right (H)       ONE-A-DAY WOMENS PO      Take 2 tablets by mouth every morning        oxyCODONE IR 10 MG tablet    ROXICODONE    22 tablet    Take 1 tablet (10 mg) by mouth every 4 hours as needed for moderate to severe pain    Carcinoma, adrenal cortical (H)       oxyCODONE-acetaminophen 5-325 MG per tablet    PERCOCET    12 tablet    Take 1-2 tablets by mouth every 4 hours as needed for pain        prochlorperazine 10 MG tablet    COMPAZINE    90 tablet    Take 1 tablet (10 mg) by mouth every 6 hours as needed for nausea or vomiting    Nausea       VITAMIN D-1000 MAX ST 1000 units Tabs   Generic drug:  cholecalciferol      Take 2,000 Units by mouth every morning        * Notice:  This list has 2 medication(s) that are the same as other medications prescribed for you. Read the directions carefully, and ask your doctor or other care provider to review them with you.

## 2018-09-14 NOTE — PROGRESS NOTES
Infusion Nursing Note:  Suzy Rodríguez presents today for IV Fluids.    Patient seen by provider today: No   present during visit today: Not Applicable.    Note: 2 Liters of NS infused over 2 hours via a peripheral IV without complications. Pt. Needs to schedule more IV fluid visits.  Intravenous Access:  No Intravenous access/labs at this visit.  Peripheral IV placed.    Treatment Conditions:  Not Applicable.      Post Infusion Assessment:  Patient tolerated infusion without incident.  Blood return noted pre and post infusion.  Site patent and intact, free from redness, edema or discomfort.  No evidence of extravasations.  Access discontinued per protocol.    Discharge Plan:   Patient and/or family verbalized understanding of discharge instructions and all questions answered.  Patient discharged in stable condition accompanied by: mother.  Departure Mode: Ambulatory.    Aretha Vega RN

## 2018-09-17 ENCOUNTER — CARE COORDINATION (OUTPATIENT)
Dept: ONCOLOGY | Facility: CLINIC | Age: 33
End: 2018-09-17

## 2018-09-17 ENCOUNTER — APPOINTMENT (OUTPATIENT)
Dept: RADIATION THERAPY | Facility: OUTPATIENT CENTER | Age: 33
End: 2018-09-17
Payer: COMMERCIAL

## 2018-09-17 NOTE — PROGRESS NOTES
TC from pt asking about when she should take her Mitotane in relation to the time she gets her blood drawn to check her Mitotane level. Message sent to Dr. Cintron for input. Await response.

## 2018-09-18 ENCOUNTER — APPOINTMENT (OUTPATIENT)
Dept: RADIATION THERAPY | Facility: OUTPATIENT CENTER | Age: 33
End: 2018-09-18
Payer: COMMERCIAL

## 2018-09-18 ENCOUNTER — TELEPHONE (OUTPATIENT)
Dept: ONCOLOGY | Facility: CLINIC | Age: 33
End: 2018-09-18

## 2018-09-18 NOTE — TELEPHONE ENCOUNTER
TC to pt per Dr. Cintron. No answer LM stating she should not take her pill within six hours of her blood draw and that it's okay to take her morning dose earlier than usual. My Chart message sent as well to be sure pt gets this information in time for her visit tomorrow.

## 2018-09-18 NOTE — TELEPHONE ENCOUNTER
----- Message from Benson Cintron MD sent at 9/17/2018  4:01 PM CDT -----  Regarding: RE: Mitotane level check  No pill within 6 hours of blood draw. Ok to take morning dose much sooner than usual.   AR    ----- Message -----     From: Padmini Infante RN     Sent: 9/17/2018   2:26 PM       To: Benson Cintron MD  Subject: Mitotane level check                             Hello!    Suzy left me a message about getting her mitotane level checked. She is wondering when she should take her pill in relation to getting her blood drawn.     Last time it was checked she had taken her pill at 0900 and had her blood drawn at 1400. Her second pill was due at 1500. She is wondering if this timing was part of the reason her level was low.     Her labs are due to be drawn at 1215 on Wednesday. Should I tell her to take her pill at any certain time?     -Padmini

## 2018-09-19 ENCOUNTER — OFFICE VISIT (OUTPATIENT)
Dept: ENDOCRINOLOGY | Facility: CLINIC | Age: 33
End: 2018-09-19
Payer: COMMERCIAL

## 2018-09-19 ENCOUNTER — APPOINTMENT (OUTPATIENT)
Dept: RADIATION THERAPY | Facility: OUTPATIENT CENTER | Age: 33
End: 2018-09-19
Payer: COMMERCIAL

## 2018-09-19 ENCOUNTER — APPOINTMENT (OUTPATIENT)
Dept: LAB | Facility: CLINIC | Age: 33
End: 2018-09-19
Attending: INTERNAL MEDICINE
Payer: COMMERCIAL

## 2018-09-19 ENCOUNTER — ONCOLOGY VISIT (OUTPATIENT)
Dept: ONCOLOGY | Facility: CLINIC | Age: 33
End: 2018-09-19
Attending: INTERNAL MEDICINE
Payer: COMMERCIAL

## 2018-09-19 ENCOUNTER — RECORDS - HEALTHEAST (OUTPATIENT)
Dept: ADMINISTRATIVE | Facility: OTHER | Age: 33
End: 2018-09-19

## 2018-09-19 ENCOUNTER — OFFICE VISIT (OUTPATIENT)
Dept: RADIATION THERAPY | Facility: OUTPATIENT CENTER | Age: 33
End: 2018-09-19
Payer: COMMERCIAL

## 2018-09-19 VITALS
DIASTOLIC BLOOD PRESSURE: 75 MMHG | WEIGHT: 158.1 LBS | RESPIRATION RATE: 16 BRPM | HEART RATE: 97 BPM | SYSTOLIC BLOOD PRESSURE: 118 MMHG | OXYGEN SATURATION: 100 % | TEMPERATURE: 98.7 F | BODY MASS INDEX: 27.14 KG/M2

## 2018-09-19 VITALS
HEART RATE: 89 BPM | DIASTOLIC BLOOD PRESSURE: 71 MMHG | OXYGEN SATURATION: 100 % | BODY MASS INDEX: 26.95 KG/M2 | RESPIRATION RATE: 18 BRPM | WEIGHT: 157 LBS | SYSTOLIC BLOOD PRESSURE: 116 MMHG

## 2018-09-19 VITALS
DIASTOLIC BLOOD PRESSURE: 75 MMHG | WEIGHT: 158.07 LBS | HEART RATE: 97 BPM | SYSTOLIC BLOOD PRESSURE: 118 MMHG | BODY MASS INDEX: 27.13 KG/M2

## 2018-09-19 DIAGNOSIS — E27.40 ADRENAL INSUFFICIENCY (H): ICD-10-CM

## 2018-09-19 DIAGNOSIS — C74.01 ADRENAL CORTEX CANCER, RIGHT (H): Primary | ICD-10-CM

## 2018-09-19 DIAGNOSIS — L27.0 RASH, DRUG: ICD-10-CM

## 2018-09-19 DIAGNOSIS — D50.0 IRON DEFICIENCY ANEMIA DUE TO CHRONIC BLOOD LOSS: ICD-10-CM

## 2018-09-19 LAB
ALBUMIN SERPL-MCNC: 3.5 G/DL (ref 3.4–5)
ALP SERPL-CCNC: 105 U/L (ref 40–150)
ALT SERPL W P-5'-P-CCNC: 20 U/L (ref 0–50)
ANION GAP SERPL CALCULATED.3IONS-SCNC: 8 MMOL/L (ref 3–14)
AST SERPL W P-5'-P-CCNC: 15 U/L (ref 0–45)
BASOPHILS # BLD AUTO: 0 10E9/L (ref 0–0.2)
BASOPHILS NFR BLD AUTO: 0.3 %
BILIRUB SERPL-MCNC: 0.2 MG/DL (ref 0.2–1.3)
BUN SERPL-MCNC: 7 MG/DL (ref 7–30)
CALCIUM SERPL-MCNC: 8.2 MG/DL (ref 8.5–10.1)
CHLORIDE SERPL-SCNC: 107 MMOL/L (ref 94–109)
CO2 SERPL-SCNC: 25 MMOL/L (ref 20–32)
CREAT SERPL-MCNC: 0.55 MG/DL (ref 0.52–1.04)
DIFFERENTIAL METHOD BLD: ABNORMAL
EOSINOPHIL # BLD AUTO: 0 10E9/L (ref 0–0.7)
EOSINOPHIL NFR BLD AUTO: 0.2 %
ERYTHROCYTE [DISTWIDTH] IN BLOOD BY AUTOMATED COUNT: 16.8 % (ref 10–15)
GFR SERPL CREATININE-BSD FRML MDRD: >90 ML/MIN/1.7M2
GLUCOSE SERPL-MCNC: 109 MG/DL (ref 70–99)
HCT VFR BLD AUTO: 35.2 % (ref 35–47)
HGB BLD-MCNC: 9.5 G/DL (ref 11.7–15.7)
IMM GRANULOCYTES # BLD: 0 10E9/L (ref 0–0.4)
IMM GRANULOCYTES NFR BLD: 0.2 %
LYMPHOCYTES # BLD AUTO: 0.3 10E9/L (ref 0.8–5.3)
LYMPHOCYTES NFR BLD AUTO: 4.7 %
MCH RBC QN AUTO: 19.9 PG (ref 26.5–33)
MCHC RBC AUTO-ENTMCNC: 27 G/DL (ref 31.5–36.5)
MCV RBC AUTO: 74 FL (ref 78–100)
MONOCYTES # BLD AUTO: 0.3 10E9/L (ref 0–1.3)
MONOCYTES NFR BLD AUTO: 4.4 %
NEUTROPHILS # BLD AUTO: 5.2 10E9/L (ref 1.6–8.3)
NEUTROPHILS NFR BLD AUTO: 90.2 %
NRBC # BLD AUTO: 0 10*3/UL
NRBC BLD AUTO-RTO: 0 /100
PLATELET # BLD AUTO: 219 10E9/L (ref 150–450)
PLATELET # BLD EST: ABNORMAL 10*3/UL
POTASSIUM SERPL-SCNC: 3.8 MMOL/L (ref 3.4–5.3)
PROT SERPL-MCNC: 6.6 G/DL (ref 6.8–8.8)
RBC # BLD AUTO: 4.77 10E12/L (ref 3.8–5.2)
SODIUM SERPL-SCNC: 140 MMOL/L (ref 133–144)
WBC # BLD AUTO: 5.7 10E9/L (ref 4–11)

## 2018-09-19 PROCEDURE — G0463 HOSPITAL OUTPT CLINIC VISIT: HCPCS | Mod: ZF

## 2018-09-19 PROCEDURE — 80053 COMPREHEN METABOLIC PANEL: CPT | Performed by: INTERNAL MEDICINE

## 2018-09-19 PROCEDURE — 84999 UNLISTED CHEMISTRY PROCEDURE: CPT | Performed by: INTERNAL MEDICINE

## 2018-09-19 PROCEDURE — 99215 OFFICE O/P EST HI 40 MIN: CPT | Mod: ZP | Performed by: INTERNAL MEDICINE

## 2018-09-19 PROCEDURE — 82627 DEHYDROEPIANDROSTERONE: CPT | Performed by: INTERNAL MEDICINE

## 2018-09-19 PROCEDURE — 80375 DRUG/SUBSTANCE NOS 1-3: CPT | Performed by: INTERNAL MEDICINE

## 2018-09-19 PROCEDURE — 85025 COMPLETE CBC W/AUTO DIFF WBC: CPT | Performed by: INTERNAL MEDICINE

## 2018-09-19 PROCEDURE — 36415 COLL VENOUS BLD VENIPUNCTURE: CPT

## 2018-09-19 RX ORDER — TRIAMCINOLONE ACETONIDE 1 MG/ML
LOTION TOPICAL 3 TIMES DAILY
Qty: 60 ML | Refills: 1 | Status: SHIPPED | OUTPATIENT
Start: 2018-09-19 | End: 2019-08-14

## 2018-09-19 ASSESSMENT — PAIN SCALES - GENERAL
PAINLEVEL: NO PAIN (0)
PAINLEVEL: NO PAIN (0)

## 2018-09-19 NOTE — PROGRESS NOTES
"[facility] PHYSICIANS  SPECIALIZING IN BREAKTHROUGHS  Radiation Oncology    On Treatment Visit Note      Suzy Rodríguez      Date: 2018   MRN: [id number]   : 1985  Diagnosis: adrenal carcinoma      Reason for Visit:  On Radiation Treatment Visit     Treatment Summary to Date  Treatment Site: abdomen Current Dose: 3060/5400 cGy Fractions:            Subjective:  Nausea better controled this past week. Taking compazine in AM and PM. No loose stool. Tolerating PO. No lightheadedness. Fatigued, taking naps. Will see medical oncology and endocrinology today. Has noted mild rash in upper torso (has had before RT while taking mitotane; no new detergents etc).       Nursing ROS:   Nutrition Alteration  Diet Type: Patient's Preference  Nutrition Note: recent diet change has helped bowels  Skin  Skin Reaction: 0 - No changes        Cardiovascular  Respiratory effort: 1 - Normal - without distress  Gastrointestinal  Nausea: 1 - One to two episodes of nausea/24  Diarrhea: 1 - Abdominal cramping, two or less soft or liquid bowel movements  GI Note: nausea 1x/day-takes zofran, bowels loose since starting chemo pill 1 month ago, takes imodium in the morning to manage bowels  Genitourinary  Urinary Status: 0 - Normal     Pain Assessment  0-10 Pain Scale: 0  Pain Note: right \"side cramps\" yesterday and today      Objective:   /71  Pulse 89  Resp 18  Wt 71.2 kg (157 lb)  SpO2 100%  BMI 26.95 kg/m2    No apparent distress. Fatigued.  Abdomen soft, nontender.  Mild maculopapular rash in upper torso (?       Labs:  CBC RESULTS:   Recent Labs   Lab Test  18   1410   WBC  7.4   RBC  4.68   HGB  9.9*   HCT  35.9   MCV  77*   MCH  21.2*   MCHC  27.6*   RDW  16.4*   PLT  375     ELECTROLYTES:  Recent Labs   Lab Test  18   1410   NA  138   POTASSIUM  4.0   CHLORIDE  102   SHASHA  8.9   CO2  29   BUN  4*   CR  0.59   GLC  89       Assessment:  32F with right adrenal gland carcinoma s/p R2 " resection receiving adjuvant radiation. Tolerating treatment. More nausea this week, not controlled on current anti-emetic regimen. All questions and concerns addressed.    Plan:   1. Continue current therapy.    2. Nausea better controlled since increasing steroid dose. Has Compazine PRN.  Discussed consideration ATC if needed.   3. Continued on 2 grams daily of mitotane currently. Have discussed dose reduction with Dr. Cintron if symptoms worsen. Will have mitotane levels checked today.   4. Discussed consideration of topical hydrocotisone for upper torso rash. Will also discuss with Dr. Cintron.    Mosaiq chart and setup information reviewed  Ports checked    Medication Review  Med list reviewed with patient?: Yes           Cesar Monsivais MD

## 2018-09-19 NOTE — NURSING NOTE
Chief Complaint   Patient presents with     Blood Draw     JIC tubes drawn from vpt by RN. Vs taken and pt checked in for appt. Notified Cintron to place orders.     JIC tubes collected from venipuncture by RN. Vitals taken. Checked in for appointment(s). Notified Cintron to place orders.    Kira Farah RN

## 2018-09-19 NOTE — PROGRESS NOTES
Endocrinology Clinic Visit 09/19/18  NAME: Suzy Rodríguez  PCP:  Adrian, [facility]  MRN:  [id number]    Chief Complaint     Chief Complaint   Patient presents with     RECHECK     ENDOCRINE       History of Present Illness     Suzy Rodríguez is a 32 year old female with past medical history of PCOS and MTHFR clotting disorder who is seen in clinic for follow up after recent diagnosis of adrenal cortical carcinoma.     Briefly she presented to me with a 9.2x8 cm right adrenal mass that was discovered incidentally on CT scan of the abdomen and pelvis done on 5/8/2018 when she presented with flank pain due to nephrolithiasis.  MRI May 15:  9.2x8.1x7.6 cm right indeterminate.     As far as symptoms, she mostly reported feeling more depressed in the past few months. Higher BP in past few months. Weight gain (s/p bariatric surgery, pre-surgery weight 235 lb, inga post-surgery 135 lbs, gained to 157 lbs in 8 months). Some easy bruising. Rounding of the face. Increased facial hair. Irregular menses.    Biochemical workup showed Cushing's disease, adrenal source.   - 24 hr urine free cortisol: 351   - AM cortisol after dex suppression: 23  - ACTH: <10  - Aldosterone and catecholamines/metanephrines normal.   - DHEAS: 740 (elevated).     Patient underwent right adrenalectomy by Dr. Mansfield on 6/256/18. Pathology: adrenal cortical carcinoma, invasion into capsule, high mitotic index and high Ki67.   Post-op was discharged on Hydrocortisone 20+10 mg.   Her post-op DHEAS was <15.     She saw Oncology and was started on radiation therapy and mitotane 07/2018.  Plan for 5 years.     Interval History:   Last visit I raised her steroid dosage to 30+20 mg of hydrocortisone.   She was seen at [facility] for second opinion on her cancer. Recommendation was in agreement with here for XRT. She completed 3/6 XRT sessions.   Last DHEAS checked 8/20/18 was <15.  Today she reports feeling ok. She had a vital illness last  week and doubled her HC dose for the past few days. She feels that the radiation has taken a toll on her, an has chronic fatigue. Is expected to finish XRT October 8th 2018. Her weight is the same but her face is less flushed and less puffy. She has no dizziness or nausea.     Problem List     Patient Active Problem List   Diagnosis     Adrenal mass, right (H)     Acute post-operative pain     Chest pain     Postoperative anemia due to acute blood loss     Pleural effusion on right     Ascites     Adrenal cortex cancer, right (H)     Anemia     Adrenal mass (H)     Dehydration     Nausea with vomiting        Medications     Current Outpatient Prescriptions   Medication     acetaminophen (TYLENOL) 325 MG tablet     buPROPion (WELLBUTRIN) 100 MG tablet     calcium carbonate antacid 1000 MG CHEW     cholecalciferol (VITAMIN D-1000 MAX ST) 1000 units TABS     EPINEPHrine (EPIPEN/ADRENACLICK/OR ANY BX GENERIC EQUIV) 0.3 MG/0.3ML injection 2-pack     ferrous sulfate (IRON) 325 (65 Fe) MG tablet     FLUoxetine (PROZAC) 20 MG capsule     gabapentin (NEURONTIN) 100 MG capsule     hydrocortisone (CORTEF) 10 MG tablet     hydrocortisone sodium succinate PF (SOLU-CORTEF) 100 MG injection     Lidocaine (LIDOCARE) 4 % Patch     LORazepam (ATIVAN) 0.5 MG tablet     LORazepam (ATIVAN) 1 MG tablet     mitotane (LYSODREN) 500 MG tablet CHEMO     Multiple Vitamins-Calcium (ONE-A-DAY WOMENS PO)     ondansetron (ZOFRAN) 8 MG tablet     oxyCODONE IR (ROXICODONE) 10 MG tablet     oxyCODONE-acetaminophen (PERCOCET) 5-325 MG per tablet     prochlorperazine (COMPAZINE) 10 MG tablet     triamcinolone (KENALOG) 0.1 % lotion     enoxaparin (LOVENOX) 40 MG/0.4ML injection     No current facility-administered medications for this visit.         Allergies     Allergies   Allergen Reactions     Bee Venom Swelling     Ibuprofen Other (See Comments), Hives and Swelling       Medical / Surgical History     Past Medical History:   Diagnosis Date      Adrenal cortex cancer, right (H) 2018    Resected 18, Dr. Mansfield.     Adrenal mass (H)      Chocolate cyst of ovary      History of miscarriage      MTHFR mutation (H)      Past Surgical History:   Procedure Laterality Date     ADRENALECTOMY Right 2018    Procedure: ADRENALECTOMY;  Right Adrenalectomy,  Embolize Liver , Anesthesia Block;  Surgeon: Warren Mansfield MD;  Location: [facility] OR     ADRENALECTOMY        SECTION      twice      SECTION      2     EMBOLECTOMY ABDOMEN N/A 2018    Procedure: EMBOLECTOMY ABDOMEN;;  Surgeon: Ector Mcintyre MD;  Location: [facility] OR     EXTRACORPOREAL SHOCK WAVE LITHOTRIPSY (ESWL)       GASTRIC BYPASS         Social History     Social History     Social History     Marital status:      Spouse name: N/A     Number of children: N/A     Years of education: N/A     Occupational History     Not on file.     Social History Main Topics     Smoking status: Never Smoker     Smokeless tobacco: Never Used     Alcohol use Yes      Comment: occ.     Drug use: No     Sexual activity: No     Other Topics Concern     Not on file     Social History Narrative       Family History     Negative for adrenal issues    ROS     Constitutional: no fevers, chills, night sweats. + fatigue. Good appetite  Eyes: no vision changes, no eye redness, no diplopia  Ears, Nose, mouth, throat: no hearing changes, no tinnitus, no rhinorrhea, no nasal congestion  Cardiovascular: no chest pain, no orthopnea or PND, no edema, no palpitations  Respiratory: no dyspnea, no cough, no sputum, no wheezing  Gastrointestinal: no nausea, no vomiting, no abdominal pain, no diarrhea, no constipation  Genitourinary: no dysuria, no frequency, no urgency, no nocturia  Musculoskeletal: no joint pains, no back pain, no cramps, no fractures  Skin: no rash, no itching, no dryness, no ulcers, no hair loss, no nail changes  Neurological:no headaches, no weakness, no numbness, no  tingling, no tremors  Psychiatric: + anxiety, + sadness  Hematologic/lymphatic: + easy bruising, no bleeding, no palor    Physical Exam   /75  Pulse 97  Wt 71.7 kg (158 lb 1.1 oz)  BMI 27.13 kg/m2  General: Comfortable, no obvious distress, normal body habitus  Eyes: Sclera anicteric, moist conjunctiva  HENT: Atraumatic, oropharynx clear, moist mucous membranes with no mucosal ulcerations, normal enamel on teeth  Neck: Trachea midline, supple. Thyroid: Thyroid is normal in size and texture  Back: no supraclavicular fat pad  CV: Regular rhythm, normal rate. No murmurs auscultated  Resp: Clear to auscultation bilaterally, good effort  Abdomen:  Soft, non tender, non distended. Bowel sounds heard. No organomegaly. No striae  Skin: No rashes, lesions, or subcutaneous nodules.   Psych: Alert and oriented x 3. Appropriate affect, good insight  Extremities: No peripheral edema  Musculoskeletal: Appropriate muscle bulk and strength  Lymphatic: No cervical lymphadenopathy  Neuro: Moves all four extremities. No focal deficits on limited exam. No proximal muscle weakness. Gait normal.     Labs/Imaging     Pertinent Labs were reviewed and updated in [facility].  Radiology Results were  reviewed and updated in EPIC.    I personally reviewed the patient's outside records from [facility]. Summary of pertinent findings in [facility].      Impression / Plan     1. Adrenal Cortical Carcinoma:  Diagnosed June 2018. Incidental Adrenal Mass 10 cm in size, with Cushing's Disease.   S/p surgery 6/25/18.     High recurrent rate due to size, invasion of capsule, high mitotic rate.   Pre-op DHEAS 740 , postop <15    Now on Mitotane (started July 2018).   XRT finished 3/6/ cycles.  Last DHEAS undetectable Aug 2018.   Follow up with Oncology    2. Cushing's disease, Adrenal source.  Resolved post-op.     3. Adrenal insufficiency   Developed after adrenalectomy. Now on mitotane (adrenocorticolytic).   She is expected to require steroid  replacement as long as she is on mitotane.     Plan:  - Maintenance dose will stay at 30 mg (AM) + 20 mg (PM) as long as she is receiving XRT  - Once done with XRT (October 2018), we will give her about a month to settle and then consider tapering down the HC dose to a lower baseline (? 20+10).  - Patient knows to double her dose on sick days.  - IM hydrocortisone in case of emergency / impending adrenal crisis      Follow up: 2 months      Roel Beverly MD  Endocrinology, Diabetes and Metabolism  [facility]

## 2018-09-19 NOTE — NURSING NOTE
"Oncology Rooming Note    September 19, 2018 12:57 PM   Suzy Rodríguez is a 32 year old female who presents for:    Chief Complaint   Patient presents with     Blood Draw     JIC tubes drawn from vpt by RN. Vs taken and pt checked in for appt. Notified Cintron to place orders.     Oncology Clinic Visit     Adrenal Cortical CA; 4 week f.u     Initial Vitals: /75 (BP Location: Right arm, Cuff Size: Adult Regular)  Pulse 97  Temp 98.7  F (37.1  C) (Oral)  Resp 16  Wt 71.7 kg (158 lb 1.6 oz)  SpO2 100%  BMI 27.14 kg/m2 Estimated body mass index is 27.14 kg/(m^2) as calculated from the following:    Height as of 8/23/18: 1.626 m (5' 4\").    Weight as of this encounter: 71.7 kg (158 lb 1.6 oz). Body surface area is 1.8 meters squared.  No Pain (0) Comment: Data Unavailable   No LMP recorded.  Allergies reviewed: Yes  Medications reviewed: Yes    Medications: Medication refills not needed today.  Pharmacy name entered into [facility]:    [id number] - [address] [place], [place] - [address] [place], [place] - [address] [facility]    Clinical concerns:      10 minutes for nursing intake (face to face time)     Rhiannon Man CMA              "

## 2018-09-19 NOTE — LETTER
9/19/2018     RE: Suzy Rodríguez  [address]  [place] [address]     Dear Colleague,    Thank you for referring your patient, Suzy Rodríguez, to the [facility] ENDOCRINOLOGY at [facility]. Please see a copy of my visit note below.    Endocrinology Clinic Visit 09/19/18  NAME: Suzy Rodríguez  PCP:  Adrian [facility]  MRN:  [id number]    Chief Complaint     Chief Complaint   Patient presents with     RECHECK     ENDOCRINE       History of Present Illness     Suzy Rodríguez is a 32 year old female with past medical history of PCOS and MTHFR clotting disorder who is seen in clinic for follow up after recent diagnosis of adrenal cortical carcinoma.     Briefly she presented to me with a 9.2x8 cm right adrenal mass that was discovered incidentally on CT scan of the abdomen and pelvis done on 5/8/2018 when she presented with flank pain due to nephrolithiasis.  MRI May 15:  9.2x8.1x7.6 cm right indeterminate.     As far as symptoms, she mostly reported feeling more depressed in the past few months. Higher BP in past few months. Weight gain (s/p bariatric surgery, pre-surgery weight 235 lb, inga post-surgery 135 lbs, gained to 157 lbs in 8 months). Some easy bruising. Rounding of the face. Increased facial hair. Irregular menses.    Biochemical workup showed Cushing's disease, adrenal source.   - 24 hr urine free cortisol: 351   - AM cortisol after dex suppression: 23  - ACTH: <10  - Aldosterone and catecholamines/metanephrines normal.   - DHEAS: 740 (elevated).     Patient underwent right adrenalectomy by Dr. Mansfield on 6/256/18. Pathology: adrenal cortical carcinoma, invasion into capsule, high mitotic index and high Ki67.   Post-op was discharged on Hydrocortisone 20+10 mg.   Her post-op DHEAS was <15.     She saw Oncology and was started on radiation therapy and mitotane 07/2018.  Plan for 5 years.     Interval History:   Last visit I raised her steroid dosage to  30+20 mg of hydrocortisone.   She was seen at [facility] for second opinion on her cancer. Recommendation was in agreement with here for XRT. She completed 3/6 XRT sessions.   Last DHEAS checked 8/20/18 was <15.  Today she reports feeling ok. She had a vital illness last week and doubled her HC dose for the past few days. She feels that the radiation has taken a toll on her, an has chronic fatigue. Is expected to finish XRT October 8th 2018. Her weight is the same but her face is less flushed and less puffy. She has no dizziness or nausea.     Problem List     Patient Active Problem List   Diagnosis     Adrenal mass, right (H)     Acute post-operative pain     Chest pain     Postoperative anemia due to acute blood loss     Pleural effusion on right     Ascites     Adrenal cortex cancer, right (H)     Anemia     Adrenal mass (H)     Dehydration     Nausea with vomiting        Medications     Current Outpatient Prescriptions   Medication     acetaminophen (TYLENOL) 325 MG tablet     buPROPion (WELLBUTRIN) 100 MG tablet     calcium carbonate antacid 1000 MG CHEW     cholecalciferol (VITAMIN D-1000 MAX ST) 1000 units TABS     EPINEPHrine (EPIPEN/ADRENACLICK/OR ANY BX GENERIC EQUIV) 0.3 MG/0.3ML injection 2-pack     ferrous sulfate (IRON) 325 (65 Fe) MG tablet     FLUoxetine (PROZAC) 20 MG capsule     gabapentin (NEURONTIN) 100 MG capsule     hydrocortisone (CORTEF) 10 MG tablet     hydrocortisone sodium succinate PF (SOLU-CORTEF) 100 MG injection     Lidocaine (LIDOCARE) 4 % Patch     LORazepam (ATIVAN) 0.5 MG tablet     LORazepam (ATIVAN) 1 MG tablet     mitotane (LYSODREN) 500 MG tablet CHEMO     Multiple Vitamins-Calcium (ONE-A-DAY WOMENS PO)     ondansetron (ZOFRAN) 8 MG tablet     oxyCODONE IR (ROXICODONE) 10 MG tablet     oxyCODONE-acetaminophen (PERCOCET) 5-325 MG per tablet     prochlorperazine (COMPAZINE) 10 MG tablet     triamcinolone (KENALOG) 0.1 % lotion     enoxaparin (LOVENOX) 40 MG/0.4ML injection     No  current facility-administered medications for this visit.         Allergies     Allergies   Allergen Reactions     Bee Venom Swelling     Ibuprofen Other (See Comments), Hives and Swelling       Medical / Surgical History     Past Medical History:   Diagnosis Date     Adrenal cortex cancer, right (H) 2018    Resected 18, Dr. Mansfield.     Adrenal mass (H)      Chocolate cyst of ovary      History of miscarriage      MTHFR mutation (H)      Past Surgical History:   Procedure Laterality Date     ADRENALECTOMY Right 2018    Procedure: ADRENALECTOMY;  Right Adrenalectomy,  Embolize Liver , Anesthesia Block;  Surgeon: Warren Mansfield MD;  Location: [facility] OR     ADRENALECTOMY        SECTION      twice      SECTION      2     EMBOLECTOMY ABDOMEN N/A 2018    Procedure: EMBOLECTOMY ABDOMEN;;  Surgeon: Ector Mcintyre MD;  Location: [facility] OR     EXTRACORPOREAL SHOCK WAVE LITHOTRIPSY (ESWL)       GASTRIC BYPASS         Social History     Social History     Social History     Marital status:      Spouse name: N/A     Number of children: N/A     Years of education: N/A     Occupational History     Not on file.     Social History Main Topics     Smoking status: Never Smoker     Smokeless tobacco: Never Used     Alcohol use Yes      Comment: occ.     Drug use: No     Sexual activity: No     Other Topics Concern     Not on file     Social History Narrative       Family History     Negative for adrenal issues    ROS     Constitutional: no fevers, chills, night sweats. + fatigue. Good appetite  Eyes: no vision changes, no eye redness, no diplopia  Ears, Nose, mouth, throat: no hearing changes, no tinnitus, no rhinorrhea, no nasal congestion  Cardiovascular: no chest pain, no orthopnea or PND, no edema, no palpitations  Respiratory: no dyspnea, no cough, no sputum, no wheezing  Gastrointestinal: no nausea, no vomiting, no abdominal pain, no diarrhea, no  constipation  Genitourinary: no dysuria, no frequency, no urgency, no nocturia  Musculoskeletal: no joint pains, no back pain, no cramps, no fractures  Skin: no rash, no itching, no dryness, no ulcers, no hair loss, no nail changes  Neurological:no headaches, no weakness, no numbness, no tingling, no tremors  Psychiatric: + anxiety, + sadness  Hematologic/lymphatic: + easy bruising, no bleeding, no palor    Physical Exam   /75  Pulse 97  Wt 71.7 kg (158 lb 1.1 oz)  BMI 27.13 kg/m2  General: Comfortable, no obvious distress, normal body habitus  Eyes: Sclera anicteric, moist conjunctiva  HENT: Atraumatic, oropharynx clear, moist mucous membranes with no mucosal ulcerations, normal enamel on teeth  Neck: Trachea midline, supple. Thyroid: Thyroid is normal in size and texture  Back: no supraclavicular fat pad  CV: Regular rhythm, normal rate. No murmurs auscultated  Resp: Clear to auscultation bilaterally, good effort  Abdomen:  Soft, non tender, non distended. Bowel sounds heard. No organomegaly. No striae  Skin: No rashes, lesions, or subcutaneous nodules.   Psych: Alert and oriented x 3. Appropriate affect, good insight  Extremities: No peripheral edema  Musculoskeletal: Appropriate muscle bulk and strength  Lymphatic: No cervical lymphadenopathy  Neuro: Moves all four extremities. No focal deficits on limited exam. No proximal muscle weakness. Gait normal.     Labs/Imaging     Pertinent Labs were reviewed and updated in [facility].  Radiology Results were  reviewed and updated in EPIC.    I personally reviewed the patient's outside records from [facility]where. Summary of pertinent findings in [facility].      Impression / Plan     1. Adrenal Cortical Carcinoma:  Diagnosed June 2018. Incidental Adrenal Mass 10 cm in size, with Cushing's Disease.   S/p surgery 6/25/18.     High recurrent rate due to size, invasion of capsule, high mitotic rate.   Pre-op DHEAS 740 , postop <15    Now on Mitotane (started July 2018).    XRT finished 3/6/ cycles.  Last DHEAS undetectable Aug 2018.   Follow up with Oncology    2. Cushing's disease, Adrenal source.  Resolved post-op.     3. Adrenal insufficiency   Developed after adrenalectomy. Now on mitotane (adrenocorticolytic).   She is expected to require steroid replacement as long as she is on mitotane.     Plan:  - Maintenance dose will stay at 30 mg (AM) + 20 mg (PM) as long as she is receiving XRT  - Once done with XRT (October 2018), we will give her about a month to settle and then consider tapering down the HC dose to a lower baseline (? 20+10).  - Patient knows to double her dose on sick days.  - IM hydrocortisone in case of emergency / impending adrenal crisis      Follow up: 2 months      Roel Beverly MD  Endocrinology, Diabetes and Metabolism  [facility]

## 2018-09-19 NOTE — NURSING NOTE
Patient labs drawn in clinic via venipuncture. Patient tolerated well. See flow sheet.    Last dose of medication was 0500 am today.    Sulma Altamirano CMA ([facility])

## 2018-09-19 NOTE — LETTER
"2018      RE: Suzy Rodríguez  [address]  [facility] [address]       [facility] PHYSICIANS  SPECIALIZING IN BREAKTHROUGHS  Radiation Oncology    On Treatment Visit Note      Suzy Rodríguez      Date: 2018   MRN: [id number]   : 1985  Diagnosis: adrenal carcinoma      Reason for Visit:  On Radiation Treatment Visit     Treatment Summary to Date  Treatment Site: abdomen Current Dose: 3060/5400 cGy Fractions:            Subjective:  Nausea better controled this past week. Taking compazine in AM and PM. No loose stool. Tolerating PO. No lightheadedness. Fatigued, taking naps. Will see medical oncology and endocrinology today. Has noted mild rash in upper torso (has had before RT while taking mitotane; no new detergents etc).       Nursing ROS:   Nutrition Alteration  Diet Type: Patient's Preference  Nutrition Note: recent diet change has helped bowels  Skin  Skin Reaction: 0 - No changes        Cardiovascular  Respiratory effort: 1 - Normal - without distress  Gastrointestinal  Nausea: 1 - One to two episodes of nausea/24  Diarrhea: 1 - Abdominal cramping, two or less soft or liquid bowel movements  GI Note: nausea 1x/day-takes zofran, bowels loose since starting chemo pill 1 month ago, takes imodium in the morning to manage bowels  Genitourinary  Urinary Status: 0 - Normal     Pain Assessment  0-10 Pain Scale: 0  Pain Note: right \"side cramps\" yesterday and today      Objective:   /71  Pulse 89  Resp 18  Wt 71.2 kg (157 lb)  SpO2 100%  BMI 26.95 kg/m2    No apparent distress. Fatigued.  Abdomen soft, nontender.  Mild maculopapular rash in upper torso (?       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 Tolerating treatment. More nausea this week, not controlled on current anti-emetic regimen. All questions and concerns addressed.    Plan:   1. Continue current therapy.    2. Nausea better controlled since increasing steroid dose. Has Compazine PRN.  Discussed consideration ATC if needed.   3. Continued on 2 grams daily of mitotane currently. Have discussed dose reduction with Dr. Cintron if symptoms worsen. Will have mitotane levels checked today.   4. Discussed consideration of topical hydrocotisone for upper torso rash. Will also discuss with Dr. Cintron.    Mosaiq chart and setup information reviewed  Ports checked    Medication Review  Med list reviewed with patient?: Yes           Cesar Monsivais MD

## 2018-09-19 NOTE — MR AVS SNAPSHOT
After Visit Summary   9/19/2018    Suzy Rodríguez    MRN: [id number]           Patient Information     Date Of Birth          1985        Visit Information        Provider Department      9/19/2018 12:45 PM Benson Cintron MD [facility]        Today's Diagnoses     Adrenal cortex cancer, right (H)    -  1    Rash, drug           Follow-ups after your visit        Your next 10 appointments already scheduled     Sep 20, 2018  9:45 AM CDT   Linear Accelerator with Lr p Rad Tech   Radiation Therapy Center ([facility])    [address], [address]  [facility] [address]   [phone]            Sep 21, 2018  9:45 AM CDT   Linear Accelerator with Lr Ump Rad Tech   [facility] Therapy Center ([facility])    [address], [address]  [facility] [address]   [phone]            Sep 24, 2018  9:45 AM CDT   Linear Accelerator with Lr p Rad Tech   [facility] ([facility])    [address]  [facility] [address]   [phone]            Sep 25, 2018  9:45 AM CDT   Linear Accelerator with Lr Ump Rad Tech   [facility] ([facility])    [address], [address]  [facility] [address]   [phone]            Sep 26, 2018  9:45 AM CDT   Linear Accelerator with Lr p Rad Tech   [facility] Center ([facility])    [address]  [facility] [address]   [phone]            Sep 26, 2018 10:00 AM CDT   ON TREATMENT VISIT with Cesar Monsivais MD   [facility] ([facility])    [address], [address]  [facility] [address]   [phone]            Sep 27, 2018  9:45 AM CDT   Linear Accelerator with Lr Ump Rad Tech   [facility] ([facility])    [address]  [facility] [address]   [phone]            Sep 28, 2018  9:45 AM CDT   Linear Accelerator with Lr p Rad Tech    Radiation Therapy Center ([facility])    [address]  [facility] [address]   [phone]            Oct 01, 2018  9:45 AM CDT   Linear Accelerator with Lr [facility]   Radiation Therapy Center ([facility])    [address]  [place] [address]   [phone]            Oct 02, 2018  9:45 AM CDT   Linear Accelerator with Lr [facility]   Radiation Therapy [place] ([facility])    [address]  [facility] [address]   [phone]              Future tests that were ordered for you today     Open Standing Orders        Priority Remaining Interval Expires Ordered    Mitotane level (Lab 4909): Laboratory Miscellaneous Order STAT 5/5 28 1/17/2019 9/19/2018    CBC with platelets differential Routine 5/5 28 1/17/2019 9/19/2018    Comprehensive metabolic panel Routine 5/5 28 1/17/2019 9/19/2018    DHEA sulfate Routine 5/5 28 1/17/2019 9/19/2018            Who to contact     If you have questions or need follow up information about today's clinic visit or your schedule please contact [facility] directly at [phone].  Normal or non-critical lab and imaging results will be communicated to you by [employer], letter or phone within 4 business days after the clinic has received the results. If you do not hear from us within 7 days, please contact the clinic through [employer] or phone. If you have a critical or abnormal lab result, we will notify you by phone as soon as possible.  Submit refill requests through [employer] or call your pharmacy and they will forward the refill request to us. Please allow 3 business days for your refill to be completed.          Additional Information About Your Visit        [employer] Information     [employer] gives you secure access to your electronic health record. If you see a primary care provider, you can also send messages to your care team and make appointments. If you have questions,  please call your primary care clinic.  If you do not have a primary care provider, please call [phone] and they will assist you.        Care EveryWhere ID     This is your Care EveryWhere ID. This could be used by other organizations to access your [facility] medical records  [id number]        Your Vitals Were     Pulse Temperature Respirations Pulse Oximetry BMI (Body Mass Index)       97 98.7  F (37.1  C) (Oral) 16 100% 27.14 kg/m2        Blood Pressure from Last 3 Encounters:   09/19/18 118/75   09/19/18 118/75   09/19/18 116/71    Weight from Last 3 Encounters:   09/19/18 71.7 kg (158 lb 1.1 oz)   09/19/18 71.7 kg (158 lb 1.6 oz)   09/19/18 71.2 kg (157 lb)              We Performed the Following     CBC with platelets differential     Comprehensive metabolic panel     DHEA sulfate     mitotane level (Lab 4909): Laboratory Miscellaneous Order     Send outs misc test          Today's Medication Changes          These changes are accurate as of 9/19/18  6:35 PM.  If you have any questions, ask your nurse or doctor.               Start taking these medicines.        Dose/Directions    triamcinolone 0.1 % lotion   Commonly known as:  KENALOG   Used for:  Adrenal cortex cancer, right (H), Rash, drug   Started by:  Benson Cintron MD        Apply topically 3 times daily To rash on chest and shoudler   Quantity:  60 mL   Refills:  1         These medicines have changed or have updated prescriptions.        Dose/Directions    hydrocortisone 10 MG tablet   Commonly known as:  CORTEF   This may have changed:  additional instructions        Take 3 pills (=30 mg) in AM and 2 pills (=20 mg) at 2 pm daily.   Quantity:  150 tablet   Refills:  11            Where to get your medicines      These medications were sent to [facility] - [address]  [address] [place] [address]     Phone:  [phone]     triamcinolone 0.1 % lotion                Primary Care Provider Office Phone #  Fax #    [facility] [phone] [address] [facility] [address]        Equal Access to Services     MARQUITA VALADEZ : Maury Redding, hiwotjohnnie chunkamarha, katerinzandra fabianashlie koehler, coleman ryanin hayaapatrick rousejamie cantor jersey young. So [facility] [phone].    ATENCIÓN: Si habla español, tiene a lyman disposición servicios gratuitos de asistencia lingüística. Llame al [phone].    We comply with applicable federal civil rights laws and [place] laws. We do not discriminate on the basis of race, color, national origin, age, disability, sex, sexual orientation, or gender identity.            Thank you!     Thank you for choosing [facility]  for your care. Our goal is always to provide you with excellent care. Hearing back from our patients is one way we can continue to improve our services. Please take a few minutes to complete the written survey that you may receive in the mail after your visit with us. Thank you!             Your Updated Medication List - Protect others around you: Learn how to safely use, store and throw away your medicines at www.disposemymeds.org.          This list is accurate as of 9/19/18  6:35 PM.  Always use your most recent med list.                   Brand Name Dispense Instructions for use Diagnosis    acetaminophen 325 MG tablet    TYLENOL    150 tablet    Take 2 tablets (650 mg) by mouth every 4 hours as needed for other (multimodal surgical pain management along with NSAIDS and opioid medication as indicated based on pain control and physical function.)    Adrenal mass, right (H), Acute post-operative pain       buPROPion 100 MG tablet    WELLBUTRIN     TAKE 1 TABLET (100 MG TOTAL) BY MOUTH 2 (TWO) TIMES A DAY.        calcium carbonate antacid 1000 MG Chew      Take 1 tablet by mouth every morning        enoxaparin 40 MG/0.4ML injection    LOVENOX    14 Syringe    Inject 0.4 mLs (40 mg) Subcutaneous daily    Adrenal  mass, right (H), H/O MTHFR mutation       EPINEPHrine 0.3 MG/0.3ML injection 2-pack    EPIPEN/ADRENACLICK/or ANY BX GENERIC EQUIV     As directed for bee stings        ferrous sulfate 325 (65 Fe) MG tablet    IRON     Take 325 mg by mouth daily        FLUoxetine 20 MG capsule    PROzac     Take 20 mg by mouth every morning        gabapentin 100 MG capsule    NEURONTIN     Take 400 mg by mouth daily        hydrocortisone 10 MG tablet    CORTEF    150 tablet    Take 3 pills (=30 mg) in AM and 2 pills (=20 mg) at 2 pm daily.        hydrocortisone sodium succinate  MG injection    solu-CORTEF    2 mL    Inject 2 mLs (100 mg) into the muscle once for 1 dose Use in emergency situations as discussed in clinic.    Secondary adrenal insufficiency (H)       Lidocaine 4 % Patch    LIDOCARE    14 patch    Place 2 patches onto the skin every 24 hours    Adrenal mass, right (H), Acute post-operative pain       * LORazepam 0.5 MG tablet    ATIVAN    30 tablet    Take 1 tablet (0.5 mg) by mouth every 8 hours as needed for anxiety    Adrenal mass (H), Anxiety       * LORazepam 1 MG tablet    ATIVAN    60 tablet    Take 1 tablet (1 mg) by mouth every 6 hours as needed for nausea or vomiting    Malignant neoplasm of adrenal gland, right (H)       mitotane 500 MG tablet CHEMO    LYSODREN    120 tablet    Take 1 gram at 7 am and then 1 gram at 3 pm. No doses at night because of insomnia and other side effects. Take nausea med before each dose.    Adrenal cortex cancer, right (H)       ondansetron 8 MG tablet    ZOFRAN    30 tablet    Take 1 tablet (8 mg) by mouth every 8 hours as needed for nausea    Adrenal cortex cancer, right (H)       ONE-A-DAY WOMENS PO      Take 2 tablets by mouth every morning        oxyCODONE IR 10 MG tablet    ROXICODONE    22 tablet    Take 1 tablet (10 mg) by mouth every 4 hours as needed for moderate to severe pain    Carcinoma, adrenal cortical (H)       oxyCODONE-acetaminophen 5-325 MG per tablet     PERCOCET    12 tablet    Take 1-2 tablets by mouth every 4 hours as needed for pain        prochlorperazine 10 MG tablet    COMPAZINE    90 tablet    Take 1 tablet (10 mg) by mouth every 6 hours as needed for nausea or vomiting    Nausea       triamcinolone 0.1 % lotion    KENALOG    60 mL    Apply topically 3 times daily To rash on chest and shoudler    Adrenal cortex cancer, right (H), Rash, drug       VITAMIN D-1000 MAX ST 1000 units Tabs   Generic drug:  cholecalciferol      Take 2,000 Units by mouth every morning        * Notice:  This list has 2 medication(s) that are the same as other medications prescribed for you. Read the directions carefully, and ask your doctor or other care provider to review them with you.

## 2018-09-19 NOTE — MR AVS SNAPSHOT
After Visit Summary   9/19/2018    Suzy Rodríguez    MRN: [id number]           Patient Information     Date Of Birth          1985        Visit Information        Provider Department      9/19/2018 10:00 AM Cesar Monsivais MD [facility]         Follow-ups after your visit        Your next 10 appointments already scheduled     Sep 19, 2018 12:15 PM CDT   Masonic Lab Draw with  MASONIC LAB DRAW   [facility] Masonic Lab Draw ([facility] [place])    [address]  [address]  [facility] [address]   [phone]            Sep 19, 2018 12:45 PM CDT   (Arrive by 12:30 PM)   Return Visit with Benson Cintron MD   [facility] ([facility])    [address] [place] [address]  [address]  [facility] [address]   [phone]            Sep 19, 2018  1:30 PM CDT   (Arrive by 1:15 PM)   RETURN ENDOCRINE with Roel Beverly MD   [facility] Endocrinology ([facility])    [address] [place] [address]  3rd Floor  [facility] [address]   [phone]            Sep 20, 2018  9:45 AM CDT   Linear Accelerator with Lr Ump Rad Tech   Radiation Therapy Center ([facility])    [address], [address]  [facility] [address]   [phone]            Sep 21, 2018  9:45 AM CDT   Linear Accelerator with Lr Ump Rad Tech   Rad[facility] Center ([facility])    [address], [address]  [facility] [address]   [phone]            Sep 24, 2018  9:45 AM CDT   Linear Accelerator with Lr Ump Rad Tech   Rad[facility] ([facility])    [address]  [facility] [address]   [phone]            Sep 25, 2018  9:45 AM CDT   Linear Accelerator with Lr Ump Rad Tech   [facility]    [address]  [facility] [address]   [phone]            Sep 26, 2018  9:45 AM CDT   Linear Accelerator with Lr Ump Rad  [facility]   Radiation Therapy Center ([facility])    [address]  [facility] [place] [address]   [phone]            Sep 26, 2018 10:00 AM CDT   ON TREATMENT VISIT with Cesar Monsivasi MD   [facility] ([facility])    [address]  [facility] [place] [place] [address]   [phone]            Sep 27, 2018  9:45 AM CDT   Linear Accelerator with Lr [facility]   Radiation Therapy [place] ([facility])    [address]  [facility] [place] [address]   [phone]              Who to contact     Please call your clinic at [phone] to:    Ask questions about your health    Make or cancel appointments    Discuss your medicines    Learn about your test results    Speak to your doctor            Additional Information About Your Visit        [employer] Information     [employer] gives you secure access to your electronic health record. If you see a primary care provider, you can also send messages to your care team and make appointments. If you have questions, please call your primary care clinic.  If you do not have a primary care provider, please call [phone] and they will assist you.      [employer] is an electronic gateway that provides easy, online access to your medical records. With [employer], you can request a clinic appointment, read your test results, renew a prescription or communicate with your care team.     To access your existing account, please contact your [facility] or call [phone] for assistance.        Care EveryWhere ID     This is your Care EveryWhere ID. This could be used by other organizations to access your [facility] medical records  [id number]        Your Vitals Were     Pulse Respirations Pulse Oximetry BMI (Body Mass Index)          89 18 100% 26.95 kg/m2         Blood Pressure from Last 3 Encounters:   09/19/18 116/71   09/14/18 107/56   09/12/18 113/81    Weight from Last 3 Encounters:    09/19/18 71.2 kg (157 lb)   09/12/18 70.8 kg (156 lb)   09/05/18 68.5 kg (151 lb)              Today, you had the following     No orders found for display         Today's Medication Changes          These changes are accurate as of 9/19/18 10:27 AM.  If you have any questions, ask your nurse or doctor.               These medicines have changed or have updated prescriptions.        Dose/Directions    hydrocortisone 10 MG tablet   Commonly known as:  CORTEF   This may have changed:  additional instructions        Take 3 pills (=30 mg) in AM and 2 pills (=20 mg) at 2 pm daily.   Quantity:  150 tablet   Refills:  11                Primary Care Provider Office Phone # Fax #    [facility] [phone]       [address] [place] [address]        Equal Access to Services     MARQUITA VALADEZ : Maury Redding, marcela siddiqi, rod koehler, coleman putnam . So [facility] [phone].    ATENCIÓN: Si habla español, tiene a lyman disposición servicios gratuitos de asistencia lingüística. Llame al [phone].    We comply with applicable federal civil rights laws and [place] laws. We do not discriminate on the basis of race, color, national origin, age, disability, sex, sexual orientation, or gender identity.            Thank you!     Thank you for choosing [facility]  for your care. Our goal is always to provide you with excellent care. Hearing back from our patients is one way we can continue to improve our services. Please take a few minutes to complete the written survey that you may receive in the mail after your visit with us. Thank you!             Your Updated Medication List - Protect others around you: Learn how to safely use, store and throw away your medicines at www.disposemymeds.org.          This list is accurate as of 9/19/18 10:27 AM.  Always use your most recent med list.                   Brand Name  Dispense Instructions for use Diagnosis    acetaminophen 325 MG tablet    TYLENOL    150 tablet    Take 2 tablets (650 mg) by mouth every 4 hours as needed for other (multimodal surgical pain management along with NSAIDS and opioid medication as indicated based on pain control and physical function.)    Adrenal mass, right (H), Acute post-operative pain       buPROPion 100 MG tablet    WELLBUTRIN     TAKE 1 TABLET (100 MG TOTAL) BY MOUTH 2 (TWO) TIMES A DAY.        calcium carbonate antacid 1000 MG Chew      Take 1 tablet by mouth every morning        enoxaparin 40 MG/0.4ML injection    LOVENOX    14 Syringe    Inject 0.4 mLs (40 mg) Subcutaneous daily    Adrenal mass, right (H), H/O MTHFR mutation       EPINEPHrine 0.3 MG/0.3ML injection 2-pack    EPIPEN/ADRENACLICK/or ANY BX GENERIC EQUIV     As directed for bee stings        ferrous sulfate 325 (65 Fe) MG tablet    IRON     Take 325 mg by mouth daily        FLUoxetine 20 MG capsule    PROzac     Take 20 mg by mouth every morning        gabapentin 100 MG capsule    NEURONTIN     Take 100 mg by mouth        hydrocortisone 10 MG tablet    CORTEF    150 tablet    Take 3 pills (=30 mg) in AM and 2 pills (=20 mg) at 2 pm daily.        HYDROCORTISONE EX      Inject 100 mg into the muscle as needed        hydrocortisone sodium succinate  MG injection    solu-CORTEF    2 mL    Inject 2 mLs (100 mg) into the muscle once for 1 dose Use in emergency situations as discussed in clinic.    Secondary adrenal insufficiency (H)       Lidocaine 4 % Patch    LIDOCARE    14 patch    Place 2 patches onto the skin every 24 hours    Adrenal mass, right (H), Acute post-operative pain       * LORazepam 0.5 MG tablet    ATIVAN    30 tablet    Take 1 tablet (0.5 mg) by mouth every 8 hours as needed for anxiety    Adrenal mass (H), Anxiety       * LORazepam 1 MG tablet    ATIVAN    60 tablet    Take 1 tablet (1 mg) by mouth every 6 hours as needed for nausea or vomiting    Malignant  neoplasm of adrenal gland, right (H)       mitotane 500 MG tablet CHEMO    LYSODREN    120 tablet    Take 1 gram at 7 am and then 1 gram at 3 pm. No doses at night because of insomnia and other side effects. Take nausea med before each dose.    Adrenal cortex cancer, right (H)       MULTIvitamin  S Caps      Take 1 capsule by mouth        ondansetron 8 MG tablet    ZOFRAN    30 tablet    Take 1 tablet (8 mg) by mouth every 8 hours as needed for nausea    Adrenal cortex cancer, right (H)       ONE-A-DAY WOMENS PO      Take 2 tablets by mouth every morning        oxyCODONE IR 10 MG tablet    ROXICODONE    22 tablet    Take 1 tablet (10 mg) by mouth every 4 hours as needed for moderate to severe pain    Carcinoma, adrenal cortical (H)       oxyCODONE-acetaminophen 5-325 MG per tablet    PERCOCET    12 tablet    Take 1-2 tablets by mouth every 4 hours as needed for pain        prochlorperazine 10 MG tablet    COMPAZINE    90 tablet    Take 1 tablet (10 mg) by mouth every 6 hours as needed for nausea or vomiting    Nausea       VITAMIN D-1000 MAX ST 1000 units Tabs   Generic drug:  cholecalciferol      Take 2,000 Units by mouth every morning        * Notice:  This list has 2 medication(s) that are the same as other medications prescribed for you. Read the directions carefully, and ask your doctor or other care provider to review them with you.

## 2018-09-19 NOTE — PROGRESS NOTES
"MEDICAL ONCOLOGY CLINIC NOTE    REFERRING PROVIDER: Warren Mansfield MD from [facility] Urologic Oncology clinic.     REASON FOR CURRENT VISIT: Follow-up while on mitotane as adjuvant therapy of adrenocortical carcinoma.      HISTORY OF PRESENT ILLNESS: Ms. 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 Her oncologic history is detailed as under.   Since last visit, Suzy got started on adjuvant RT to surgical field by Dr. Monsivais at [facility]. She's tolerating this well and is half-way through the treatment. She did have significant nausea with mitotane and Dr. Monsivais switched her to Compazine from Zofran, which has helped a lot. At that time, she also had a viral URI (whole family had it) and some of her symptoms may have been related to that. She's on 2x dose of replacement hydrocortisone at this time per Endocrine recs. Fatigue, mood changes, and weight changes are better.     She's taking mitotane 1000mg BID at this time and was taking 500mg BID when first mitotane level was done. Only other issue is pruritic, erythematous nodular rash on chest and shoulders, that appears related to mitotane. Doesn't have any signs/symptoms suggestive of recurrence of malignancy.     ONCOLOGIC HISTORY:   1. 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   - CT abdomen and pelvis stone protocol without contrast 5 9112 showed \"9.2 x 8 cm heterogeneous right upper quadrant mass with small foci of calcification within it which is likely arising from the adrenal gland though inseparable from liver and upper pole of right kidney. It is incompletely evaluated on this noncontrast study. 3 x 2.6 cm mass right lobe of liver on image #85 also " "incompletely evaluated. 6 x 4 x 4 mm upper third left ureteral obstructing stone with mild to moderate secondary hydronephrosis. Collection of stones at lower pole left kidney extending over an area of approximately 6 x 7 x 4 mm. Additional nonobstructing 1 mm stone upper pole left kidney. 2 mm nonobstructing stone noted upper pole right kidney with no hydronephrosis on the right. Postop change consistent with gastric bypass. Noncontrast images of spleen, left adrenal gland, gallbladder, and pancreas unremarkable. No aneurysm. No adenopathy.\"  - Seen by Dr. Delvalle at [facility] clinic. Referred to Dr. Alvino Patel and then Dr. Warren Mansfield.  - Endocrinology team directed workup showed high DHEAS level of 740 pre-surgery.   - Right open adrenalectomy and hepatic mobilization performed by Dr. Warren Mansfield on 6/25/2018. Pathology showed adrenocortical carcinoma measuring 11.3 cm, weighing 413 g with extension into or through the adrenal capsule negative lymphovascular invasion, tumor necrosis present, margins involved by tumor, no regional lymph nodes identified, pathologic stage T2 NX.  - DHEAS normalized postsurgery.   - Adjuvant Mitotane started on 7/24/18.  - Saw radiation oncology at [facility], radiation oncology at [facility], as well as endocrine oncology (Dr. Huertas) at [facility].  Now on adjuvant RT by Dr. Monsivais - plan to give 5040 cGy over 30 fr (started 8/24/18).    INTERVAL HISTORY:   Feeling very fatigued recently.  Denies fevers, chills, night sweats.  Has had marked difficulty falling asleep despite trial of melatonin and sleep hygiene modifications.  OFten has to nap during the day due to fatigue.  Notes increased nausea with increasing mitotane dose.  She has been taking lorazepam at night as needed when unable to sleep.  No vomiting, diarrhea, or weight loss.  Eating and drinking OK, but didn't have much of an appetite.  Having regular BMs. "  Notes increasing restless legs at night, which have improved with gabapentin.  Concerned today about getting IV access as she is a difficult stick often requiring 4-5 attempts at blood draws or IV starts.  Notes diffuse pain and achyness and is interested in medical cannabis card referral.  Evaluated at [facility] by Dr. Huertas, and reported agreement with current therapy plan.    REVIEW OF SYSTEMS: 14 point ROS negative other than the symptoms noted above in the HPI.    PAST MEDICAL AND SURGICAL HISTORY: Reviewed today  1. Right-sided adrenocortical carcinoma.  2. MHTFR mutation with h.o mutiple miscarriages.  3. Ovarian cysts diagnosed in 2011.  4. H/o gastric bypass in 2016 for obesity.    SOCIAL HISTORY:   Social History   Substance Use Topics     Smoking status: Never Smoker     Smokeless tobacco: Never Used     Alcohol use Yes      Comment: occ.     FAMILY HISTORY:   Reviewed and non-contributory to current admission.      ALLERGIES:   Allergies   Allergen Reactions     Bee Venom Swelling     Ibuprofen Other (See Comments), Hives and Swelling     CURRENT MEDICATIONS:   Current Outpatient Prescriptions:      acetaminophen (TYLENOL) 325 MG tablet, Take 2 tablets (650 mg) by mouth every 4 hours as needed for other (multimodal surgical pain management along with NSAIDS and opioid medication as indicated based on pain control and physical function.), Disp: 150 tablet, Rfl: 0     buPROPion (WELLBUTRIN) 100 MG tablet, TAKE 1 TABLET (100 MG TOTAL) BY MOUTH 2 (TWO) TIMES A DAY., Disp: , Rfl: 3     calcium carbonate antacid 1000 MG CHEW, Take 1 tablet by mouth every morning , Disp: , Rfl:      cholecalciferol (VITAMIN D-1000 MAX ST) 1000 units TABS, Take 2,000 Units by mouth every morning , Disp: , Rfl:      ferrous sulfate (IRON) 325 (65 Fe) MG tablet, Take 325 mg by mouth daily, Disp: , Rfl:      FLUoxetine (PROZAC) 20 MG capsule, Take 20 mg by mouth every morning , Disp: , Rfl:      gabapentin (NEURONTIN) 100 MG  capsule, Take 400 mg by mouth daily , Disp: , Rfl:      hydrocortisone (CORTEF) 10 MG tablet, Take 3 pills (=30 mg) in AM and 2 pills (=20 mg) at 2 pm daily. (Patient taking differently: Take 3 pills (=30 mg) in AM and 2 pills (=20 mg) at 2 pm daily.), Disp: 150 tablet, Rfl: 11     hydrocortisone sodium succinate PF (SOLU-CORTEF) 100 MG injection, Inject 2 mLs (100 mg) into the muscle once for 1 dose Use in emergency situations as discussed in clinic., Disp: 2 mL, Rfl: 1     LORazepam (ATIVAN) 0.5 MG tablet, Take 1 tablet (0.5 mg) by mouth every 8 hours as needed for anxiety, Disp: 30 tablet, Rfl: 0     LORazepam (ATIVAN) 1 MG tablet, Take 1 tablet (1 mg) by mouth every 6 hours as needed for nausea or vomiting, Disp: 60 tablet, Rfl: 1     mitotane (LYSODREN) 500 MG tablet CHEMO, Take 1 gram at 7 am and then 1 gram at 3 pm. No doses at night because of insomnia and other side effects. Take nausea med before each dose., Disp: 120 tablet, Rfl: 0     Multiple Vitamins-Calcium (ONE-A-DAY WOMENS PO), Take 2 tablets by mouth every morning , Disp: , Rfl:      oxyCODONE IR (ROXICODONE) 10 MG tablet, Take 1 tablet (10 mg) by mouth every 4 hours as needed for moderate to severe pain, Disp: 22 tablet, Rfl: 0     oxyCODONE-acetaminophen (PERCOCET) 5-325 MG per tablet, Take 1-2 tablets by mouth every 4 hours as needed for pain, Disp: 12 tablet, Rfl: 0     prochlorperazine (COMPAZINE) 10 MG tablet, Take 1 tablet (10 mg) by mouth every 6 hours as needed for nausea or vomiting, Disp: 90 tablet, Rfl: 1     triamcinolone (KENALOG) 0.1 % lotion, Apply topically 3 times daily To rash on chest and shoudler, Disp: 60 mL, Rfl: 1     enoxaparin (LOVENOX) 40 MG/0.4ML injection, Inject 0.4 mLs (40 mg) Subcutaneous daily (Patient not taking: Reported on 9/19/2018), Disp: 14 Syringe, Rfl: 0     EPINEPHrine (EPIPEN/ADRENACLICK/OR ANY BX GENERIC EQUIV) 0.3 MG/0.3ML injection 2-pack, As directed for bee stings, Disp: , Rfl:      Lidocaine  (LIDOCARE) 4 % Patch, Place 2 patches onto the skin every 24 hours, Disp: 14 patch, Rfl: 0     ondansetron (ZOFRAN) 8 MG tablet, Take 1 tablet (8 mg) by mouth every 8 hours as needed for nausea, Disp: 30 tablet, Rfl: 1    PHYSICAL EXAMINATION:  Vital signs: /75 (BP Location: Right arm, Cuff Size: Adult Regular)  Pulse 97  Temp 98.7  F (37.1  C) (Oral)  Resp 16  Wt 71.7 kg (158 lb 1.6 oz)  SpO2 100%  BMI 27.14 kg/m2  ECOG performance status of 0.   GENERAL: NAD, WDWN  HEENT: No icterus, no pallor. MMM, PERRL, OP clear.   NECK: Supple, no JVD/LAD.  LUNGS: CTAB, normal WOB on RA   CARDIOVASCULAR: Regular rate and rhythm, no murmurs, gallops or rubs, normal S1/S2.   ABDOMEN: Well-healing abdominal incision without fluctuance or exudate. Soft, NT, ND, no masses appreciated, normal BS.   EXTREMITIES: No cyanosis, no clubbing, no edema.   NEUROLOGIC: No focal deficits, CN 2-12 intact. Linear thought process.    LYMPH NODE EXAM: No palpable adenopathy - cervical, supraclavicular.      LABORATORY DATA:  Recent Labs   Lab Test  09/19/18   1231  08/20/18   1410  07/14/18   1508   WBC  5.7  7.4  7.6   RBC  4.77  4.68  3.20*   HGB  9.5*  9.9*  8.3*   HCT  35.2  35.9  28.3*   MCV  74*  77*  88   MCH  19.9*  21.2*  25.9*   MCHC  27.0*  27.6*  29.3*   RDW  16.8*  16.4*  14.9   PLT  219  375  464*   NEUTROPHIL  90.2  75.1  74.4     Recent Labs   Lab Test  09/19/18   1231  08/20/18   1410  07/23/18   0815  07/14/18   1508   NA  140  138   --   136   POTASSIUM  3.8  4.0   --   3.5   CHLORIDE  107  102   --   100   CO2  25  29   --   28   ANIONGAP  8  6   --   8   GLC  109*  89  112*  103*   BUN  7  4*   --   6*   CR  0.55  0.59   --   0.80   SHASHA  8.2*  8.9   --   8.7     Recent Labs   Lab Test  09/19/18   1231  08/20/18   1410  07/14/18   1508   PROTTOTAL  6.6*  7.2  7.1   ALBUMIN  3.5  3.8  3.4   BILITOTAL  0.2  0.3  0.5   ALKPHOS  105  114  141   AST  15  16  25   ALT  20  26  39     DHEAS <15 on 8/20/18   Mitotane level  1.8ng/dl on 8/20/18 (target 14-20).    IMAGING STUDIES:  As above.     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     1. Adrenocortical ca:  - Started on adjuvant mitotane in July 2018 based on her presentation and tumor characteristics including invasion of the adrenal capsule, high mitotic rate, possibly positive margins, and high Ki67, which could result in a rate of recurrence as high as 40%.    - Patient tolerating adjuvant mitotane ok overall and will continue 1000mg BID for now.   - Mitotane level ([facility] lab #[id number]) drawn today and patient will call on Tues/Wed next week to follow-up on this sendout test. If this level is less than 15, she'll go up to 1500mg BID or 1000 TID as tolerated.  - Continue Compazine for nausea.  - Follow-up on today's DHEAS level and then recheck monthly.  - RT per Dr. Monsivais.   - Pt planning to see Dr. Hilton in October 2018.    2. Drug induced rash:  - Triamcinolone Rx sent.    3. Endocrinopathies:  - Mgmt per endocrine team at the . Patient will see them later today.    4. Multifactorial anemia with a component of iron deficiency:  - Low MCV suggests iron deficiency.   - Will check Fe panel on follow-up visit and plan to give IV iron in the coming months unless her PO intake repletes the iron stores sufficiently.    Return to see me in 4 weeks with labs. Patient and family given opportunity to ask questions, which were answered to their satisfaction. They're in complete agreement as planned.    Benson Cintron M.D.  . Professor of Medicine  Genitourinary Oncology  Division of Hematology, Oncology & Transplantation  [facility]

## 2018-09-19 NOTE — MR AVS SNAPSHOT
After Visit Summary   9/19/2018    Suzy Rodríguez    MRN: [id number]           Patient Information     Date Of Birth          1985        Visit Information        Provider Department      9/19/2018 1:30 PM Roel Beverly MD [facility]        Today's Diagnoses     Adrenal cortex cancer, right (H)    -  1    Adrenal insufficiency (H)           Follow-ups after your visit        Follow-up notes from your care team     Return in about 2 months (around 11/19/2018).      Your next 10 appointments already scheduled     Sep 20, 2018  9:45 AM CDT   Linear Accelerator with Lr [facility] Rad Tech   Radiation Therapy Center ([facility])    [address], [address]  [facility] [address]   [phone]            Sep 21, 2018  9:45 AM CDT   Linear Accelerator with Lr [facility] Rad Tech   Radiation Therapy Center ([facility])    [address], [address]  [facility] [address]   [phone]            Sep 24, 2018  9:45 AM CDT   Linear Accelerator with Lr [facility] Rad Tech   Radiation Therapy Center ([facility])    [address], [address]  [facility] [address]   [phone]            Sep 25, 2018  9:45 AM CDT   Linear Accelerator with Lr [facility] Rad Tech   Radiation Therapy Center ([facility])    [address], [address]  [facility] [address]   [phone]            Sep 26, 2018  9:45 AM CDT   Linear Accelerator with Lr [facility] Rad Tech   Radiation Therapy Center ([facility])    [address], [address]  [facility] [address]   [phone]            Sep 26, 2018 10:00 AM CDT   ON TREATMENT VISIT with Cesar Monsivais MD   Radiation Therapy Center ([facility])    [address], [address]  [facility] [address]   [phone]            Sep 27, 2018  9:45 AM CDT   Linear Accelerator with Lr [facility] Rad Tech   Radiation Therapy Center ([facility])    [address], [address]  [facility] [address]    [phone]            Sep 28, 2018  9:45 AM CDT   Linear Accelerator with Lr p [facility]   Radiation Therapy Center ([facility])    [address], [address]  [place] [address]   [phone]            Oct 01, 2018  9:45 AM CDT   Linear Accelerator with Lr [facility] Tech   Radiation Therapy Center ([facility])    [address], [address]  [place] [address]   [phone]            Oct 02, 2018  9:45 AM CDT   Linear Accelerator with Lr [facility] Tech   Radiation Therapy Center ([facility])    [address], [address]  [facility] [address]   [phone]              Future tests that were ordered for you today     Open Standing Orders        Priority Remaining Interval Expires Ordered    Mitotane level (Lab 4909): Laboratory Miscellaneous Order STAT 5/5 28 1/17/2019 9/19/2018    CBC with platelets differential Routine 5/5 28 1/17/2019 9/19/2018    Comprehensive metabolic panel Routine 5/5 28 1/17/2019 9/19/2018    DHEA sulfate Routine 5/5 28 1/17/2019 9/19/2018            Who to contact     Please call your clinic at [phone] to:    Ask questions about your health    Make or cancel appointments    Discuss your medicines    Learn about your test results    Speak to your doctor            Additional Information About Your Visit        [employer] Information     [employer] gives you secure access to your electronic health record. If you see a primary care provider, you can also send messages to your care team and make appointments. If you have questions, please call your primary care clinic.  If you do not have a primary care provider, please call [phone] and they will assist you.      [employer] is an electronic gateway that provides easy, online access to your medical records. With [employer], you can request a clinic appointment, read your test results, renew a prescription or communicate with your care team.     To access your existing account, please  contact your [facility] or call [phone] for assistance.        Care EveryWhere ID     This is your Care EveryWhere ID. This could be used by other organizations to access your [place] medical records  [id number]        Your Vitals Were     Pulse BMI (Body Mass Index)                97 27.13 kg/m2           Blood Pressure from Last 3 Encounters:   09/19/18 118/75   09/19/18 118/75   09/19/18 116/71    Weight from Last 3 Encounters:   09/19/18 71.7 kg (158 lb 1.1 oz)   09/19/18 71.7 kg (158 lb 1.6 oz)   09/19/18 71.2 kg (157 lb)              Today, you had the following     No orders found for display         Today's Medication Changes          These changes are accurate as of 9/19/18  2:09 PM.  If you have any questions, ask your nurse or doctor.               Start taking these medicines.        Dose/Directions    triamcinolone 0.1 % lotion   Commonly known as:  KENALOG   Used for:  Adrenal cortex cancer, right (H), Rash, drug   Started by:  Benson Cintron MD        Apply topically 3 times daily To rash on chest and shoudler   Quantity:  60 mL   Refills:  1         These medicines have changed or have updated prescriptions.        Dose/Directions    hydrocortisone 10 MG tablet   Commonly known as:  CORTEF   This may have changed:  additional instructions        Take 3 pills (=30 mg) in AM and 2 pills (=20 mg) at 2 pm daily.   Quantity:  150 tablet   Refills:  11            Where to get your medicines      These medications were sent to [facility] [place], [place] - [address]  [address] [place] [address]     Phone:  [phone]     triamcinolone 0.1 % lotion                Primary Care Provider Office Phone # Fax #    [facility] [phone]       [facility] [address]        Equal Access to Services     MARQUITA VALADEZ AH: Maury Redding, marcela siddiqi, rod koehler,  coleman contreraspatrick curran'aan ah. [facility] [phone].    ATENCIÓN: Si josé antonio rodrigues, tiene a lyman disposición servicios gratuitos de asistencia lingüística. Rena mcfadden [phone].    We comply with applicable federal civil rights laws and [place] laws. We do not discriminate on the basis of race, color, national origin, age, disability, sex, sexual orientation, or gender identity.            Thank you!     Thank you for choosing [facility] ENDOCRINOLOGY  for your care. Our goal is always to provide you with excellent care. Hearing back from our patients is one way we can continue to improve our services. Please take a few minutes to complete the written survey that you may receive in the mail after your visit with us. Thank you!             Your Updated Medication List - Protect others around you: Learn how to safely use, store and throw away your medicines at www.disposemymeds.org.          This list is accurate as of 9/19/18  2:09 PM.  Always use your most recent med list.                   Brand Name Dispense Instructions for use Diagnosis    acetaminophen 325 MG tablet    TYLENOL    150 tablet    Take 2 tablets (650 mg) by mouth every 4 hours as needed for other (multimodal surgical pain management along with NSAIDS and opioid medication as indicated based on pain control and physical function.)    Adrenal mass, right (H), Acute post-operative pain       buPROPion 100 MG tablet    WELLBUTRIN     TAKE 1 TABLET (100 MG TOTAL) BY MOUTH 2 (TWO) TIMES A DAY.        calcium carbonate antacid 1000 MG Chew      Take 1 tablet by mouth every morning        enoxaparin 40 MG/0.4ML injection    LOVENOX    14 Syringe    Inject 0.4 mLs (40 mg) Subcutaneous daily    Adrenal mass, right (H), H/O MTHFR mutation       EPINEPHrine 0.3 MG/0.3ML injection 2-pack    EPIPEN/ADRENACLICK/or ANY BX GENERIC EQUIV     As directed for bee stings        ferrous sulfate 325 (65 Fe) MG tablet    IRON     Take 325 mg by mouth daily         FLUoxetine 20 MG capsule    PROzac     Take 20 mg by mouth every morning        gabapentin 100 MG capsule    NEURONTIN     Take 400 mg by mouth daily        hydrocortisone 10 MG tablet    CORTEF    150 tablet    Take 3 pills (=30 mg) in AM and 2 pills (=20 mg) at 2 pm daily.        hydrocortisone sodium succinate  MG injection    solu-CORTEF    2 mL    Inject 2 mLs (100 mg) into the muscle once for 1 dose Use in emergency situations as discussed in clinic.    Secondary adrenal insufficiency (H)       Lidocaine 4 % Patch    LIDOCARE    14 patch    Place 2 patches onto the skin every 24 hours    Adrenal mass, right (H), Acute post-operative pain       * LORazepam 0.5 MG tablet    ATIVAN    30 tablet    Take 1 tablet (0.5 mg) by mouth every 8 hours as needed for anxiety    Adrenal mass (H), Anxiety       * LORazepam 1 MG tablet    ATIVAN    60 tablet    Take 1 tablet (1 mg) by mouth every 6 hours as needed for nausea or vomiting    Malignant neoplasm of adrenal gland, right (H)       mitotane 500 MG tablet CHEMO    LYSODREN    120 tablet    Take 1 gram at 7 am and then 1 gram at 3 pm. No doses at night because of insomnia and other side effects. Take nausea med before each dose.    Adrenal cortex cancer, right (H)       ondansetron 8 MG tablet    ZOFRAN    30 tablet    Take 1 tablet (8 mg) by mouth every 8 hours as needed for nausea    Adrenal cortex cancer, right (H)       ONE-A-DAY WOMENS PO      Take 2 tablets by mouth every morning        oxyCODONE IR 10 MG tablet    ROXICODONE    22 tablet    Take 1 tablet (10 mg) by mouth every 4 hours as needed for moderate to severe pain    Carcinoma, adrenal cortical (H)       oxyCODONE-acetaminophen 5-325 MG per tablet    PERCOCET    12 tablet    Take 1-2 tablets by mouth every 4 hours as needed for pain        prochlorperazine 10 MG tablet    COMPAZINE    90 tablet    Take 1 tablet (10 mg) by mouth every 6 hours as needed for nausea or vomiting    Nausea        triamcinolone 0.1 % lotion    KENALOG    60 mL    Apply topically 3 times daily To rash on chest and shoudler    Adrenal cortex cancer, right (H), Rash, drug       VITAMIN D-1000 MAX ST 1000 units Tabs   Generic drug:  cholecalciferol      Take 2,000 Units by mouth every morning        * Notice:  This list has 2 medication(s) that are the same as other medications prescribed for you. Read the directions carefully, and ask your doctor or other care provider to review them with you.

## 2018-09-19 NOTE — LETTER
"9/19/2018       RE: Suzy Rodríguez  [address]  [place] [address]     Dear Colleague,    Thank you for referring your patient, Suzy Rodríguez, to the [facility]. Please see a copy of my visit note below.    MEDICAL ONCOLOGY CLINIC NOTE    REFERRING PROVIDER: Warren Mansfield MD from [facility] clinic.     REASON FOR CURRENT VISIT: Follow-up while on mitotane as adjuvant therapy of adrenocortical carcinoma.      HISTORY OF PRESENT ILLNESS: Ms. 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 Her oncologic history is detailed as under.   Since last visit, Suzy got started on adjuvant RT to surgical field by Dr. Monsivais at [facility]. She's tolerating this well and is half-way through the treatment. She did have significant nausea with mitotane and Dr. Monsivais switched her to Compazine from Zofran, which has helped a lot. At that time, she also had a viral URI (whole family had it) and some of her symptoms may have been related to that. She's on 2x dose of replacement hydrocortisone at this time per Endocrine recs. Fatigue, mood changes, and weight changes are better.     She's taking mitotane 1000mg BID at this time and was taking 500mg BID when first mitotane level was done. Only other issue is pruritic, erythematous nodular rash on chest and shoulders, that appears related to mitotane. Doesn't have any signs/symptoms suggestive of recurrence of malignancy.     ONCOLOGIC HISTORY:   1. 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   - CT abdomen and pelvis stone protocol without contrast 5 3091 showed \"9.2 x 8 cm heterogeneous right upper quadrant mass with small foci of " "calcification within it which is likely arising from the adrenal gland though inseparable from liver and upper pole of right kidney. It is incompletely evaluated on this noncontrast study. 3 x 2.6 cm mass right lobe of liver on image #85 also incompletely evaluated. 6 x 4 x 4 mm upper third left ureteral obstructing stone with mild to moderate secondary hydronephrosis. Collection of stones at lower pole left kidney extending over an area of approximately 6 x 7 x 4 mm. Additional nonobstructing 1 mm stone upper pole left kidney. 2 mm nonobstructing stone noted upper pole right kidney with no hydronephrosis on the right. Postop change consistent with gastric bypass. Noncontrast images of spleen, left adrenal gland, gallbladder, and pancreas unremarkable. No aneurysm. No adenopathy.\"  - Seen by Dr. Delvalle at [facility]. Referred to Dr. Alvino Patel and then Dr. Warren Mansfield.  - Endocrinology team directed workup showed high DHEAS level of 740 pre-surgery.   - Right open adrenalectomy and hepatic mobilization performed by Dr. Warren Mansfield on 6/25/2018. Pathology showed adrenocortical carcinoma measuring 11.3 cm, weighing 413 g with extension into or through the adrenal capsule negative lymphovascular invasion, tumor necrosis present, margins involved by tumor, no regional lymph nodes identified, pathologic stage T2 NX.  - DHEAS normalized postsurgery.   - Adjuvant Mitotane started on 7/24/18.  - Saw radiation oncology at [facility], radiation oncology at [facility], as well as endocrine oncology (Dr. Huertas) at [facility].  Now on adjuvant RT by Dr. Monsivais - plan to give 5040 cGy over 30 fr (started 8/24/18).    INTERVAL HISTORY:   Feeling very fatigued recently.  Denies fevers, chills, night sweats.  Has had marked difficulty falling asleep despite trial of melatonin and sleep hygiene modifications.  OFten has to nap during the day due to fatigue.  Notes " increased nausea with increasing mitotane dose.  She has been taking lorazepam at night as needed when unable to sleep.  No vomiting, diarrhea, or weight loss.  Eating and drinking OK, but didn't have much of an appetite.  Having regular BMs.  Notes increasing restless legs at night, which have improved with gabapentin.  Concerned today about getting IV access as she is a difficult stick often requiring 4-5 attempts at blood draws or IV starts.  Notes diffuse pain and achyness and is interested in medical cannabis card referral.  Evaluated at [facility] by Dr. Huertas, and reported agreement with current therapy plan.    REVIEW OF SYSTEMS: 14 point ROS negative other than the symptoms noted above in the HPI.    PAST MEDICAL AND SURGICAL HISTORY: Reviewed today  1. Right-sided adrenocortical carcinoma.  2. MHTFR mutation with h.o mutiple miscarriages.  3. Ovarian cysts diagnosed in 2011.  4. H/o gastric bypass in 2016 for obesity.    SOCIAL HISTORY:   Social History   Substance Use Topics     Smoking status: Never Smoker     Smokeless tobacco: Never Used     Alcohol use Yes      Comment: occ.     FAMILY HISTORY:   Reviewed and non-contributory to current admission.      ALLERGIES:   Allergies   Allergen Reactions     Bee Venom Swelling     Ibuprofen Other (See Comments), Hives and Swelling     CURRENT MEDICATIONS:   Current Outpatient Prescriptions:      acetaminophen (TYLENOL) 325 MG tablet, Take 2 tablets (650 mg) by mouth every 4 hours as needed for other (multimodal surgical pain management along with NSAIDS and opioid medication as indicated based on pain control and physical function.), Disp: 150 tablet, Rfl: 0     buPROPion (WELLBUTRIN) 100 MG tablet, TAKE 1 TABLET (100 MG TOTAL) BY MOUTH 2 (TWO) TIMES A DAY., Disp: , Rfl: 3     calcium carbonate antacid 1000 MG CHEW, Take 1 tablet by mouth every morning , Disp: , Rfl:      cholecalciferol (VITAMIN D-1000 MAX ST) 1000 units TABS, Take 2,000 Units by mouth every  morning , Disp: , Rfl:      ferrous sulfate (IRON) 325 (65 Fe) MG tablet, Take 325 mg by mouth daily, Disp: , Rfl:      FLUoxetine (PROZAC) 20 MG capsule, Take 20 mg by mouth every morning , Disp: , Rfl:      gabapentin (NEURONTIN) 100 MG capsule, Take 400 mg by mouth daily , Disp: , Rfl:      hydrocortisone (CORTEF) 10 MG tablet, Take 3 pills (=30 mg) in AM and 2 pills (=20 mg) at 2 pm daily. (Patient taking differently: Take 3 pills (=30 mg) in AM and 2 pills (=20 mg) at 2 pm daily.), Disp: 150 tablet, Rfl: 11     hydrocortisone sodium succinate PF (SOLU-CORTEF) 100 MG injection, Inject 2 mLs (100 mg) into the muscle once for 1 dose Use in emergency situations as discussed in clinic., Disp: 2 mL, Rfl: 1     LORazepam (ATIVAN) 0.5 MG tablet, Take 1 tablet (0.5 mg) by mouth every 8 hours as needed for anxiety, Disp: 30 tablet, Rfl: 0     LORazepam (ATIVAN) 1 MG tablet, Take 1 tablet (1 mg) by mouth every 6 hours as needed for nausea or vomiting, Disp: 60 tablet, Rfl: 1     mitotane (LYSODREN) 500 MG tablet CHEMO, Take 1 gram at 7 am and then 1 gram at 3 pm. No doses at night because of insomnia and other side effects. Take nausea med before each dose., Disp: 120 tablet, Rfl: 0     Multiple Vitamins-Calcium (ONE-A-DAY WOMENS PO), Take 2 tablets by mouth every morning , Disp: , Rfl:      oxyCODONE IR (ROXICODONE) 10 MG tablet, Take 1 tablet (10 mg) by mouth every 4 hours as needed for moderate to severe pain, Disp: 22 tablet, Rfl: 0     oxyCODONE-acetaminophen (PERCOCET) 5-325 MG per tablet, Take 1-2 tablets by mouth every 4 hours as needed for pain, Disp: 12 tablet, Rfl: 0     prochlorperazine (COMPAZINE) 10 MG tablet, Take 1 tablet (10 mg) by mouth every 6 hours as needed for nausea or vomiting, Disp: 90 tablet, Rfl: 1     triamcinolone (KENALOG) 0.1 % lotion, Apply topically 3 times daily To rash on chest and shoudler, Disp: 60 mL, Rfl: 1     enoxaparin (LOVENOX) 40 MG/0.4ML injection, Inject 0.4 mLs (40 mg)  Subcutaneous daily (Patient not taking: Reported on 9/19/2018), Disp: 14 Syringe, Rfl: 0     EPINEPHrine (EPIPEN/ADRENACLICK/OR ANY BX GENERIC EQUIV) 0.3 MG/0.3ML injection 2-pack, As directed for bee stings, Disp: , Rfl:      Lidocaine (LIDOCARE) 4 % Patch, Place 2 patches onto the skin every 24 hours, Disp: 14 patch, Rfl: 0     ondansetron (ZOFRAN) 8 MG tablet, Take 1 tablet (8 mg) by mouth every 8 hours as needed for nausea, Disp: 30 tablet, Rfl: 1    PHYSICAL EXAMINATION:  Vital signs: /75 (BP Location: Right arm, Cuff Size: Adult Regular)  Pulse 97  Temp 98.7  F (37.1  C) (Oral)  Resp 16  Wt 71.7 kg (158 lb 1.6 oz)  SpO2 100%  BMI 27.14 kg/m2  ECOG performance status of 0.   GENERAL: NAD, WDWN  HEENT: No icterus, no pallor. MMM, PERRL, OP clear.   NECK: Supple, no JVD/LAD.  LUNGS: CTAB, normal WOB on RA   CARDIOVASCULAR: Regular rate and rhythm, no murmurs, gallops or rubs, normal S1/S2.   ABDOMEN: Well-healing abdominal incision without fluctuance or exudate. Soft, NT, ND, no masses appreciated, normal BS.   EXTREMITIES: No cyanosis, no clubbing, no edema.   NEUROLOGIC: No focal deficits, CN 2-12 intact. Linear thought process.    LYMPH NODE EXAM: No palpable adenopathy - cervical, supraclavicular.      LABORATORY DATA:  Recent Labs   Lab Test  09/19/18   1231  08/20/18   1410  07/14/18   1508   WBC  5.7  7.4  7.6   RBC  4.77  4.68  3.20*   HGB  9.5*  9.9*  8.3*   HCT  35.2  35.9  28.3*   MCV  74*  77*  88   MCH  19.9*  21.2*  25.9*   MCHC  27.0*  27.6*  29.3*   RDW  16.8*  16.4*  14.9   PLT  219  375  464*   NEUTROPHIL  90.2  75.1  74.4     Recent Labs   Lab Test  09/19/18   1231  08/20/18   1410  07/23/18   0815  07/14/18   1508   NA  140  138   --   136   POTASSIUM  3.8  4.0   --   3.5   CHLORIDE  107  102   --   100   CO2  25  29   --   28   ANIONGAP  8  6   --   8   GLC  109*  89  112*  103*   BUN  7  4*   --   6*   CR  0.55  0.59   --   0.80   SHASHA  8.2*  8.9   --   8.7     Recent Labs   Lab  Test  09/19/18   1231  08/20/18   1410  07/14/18   1508   PROTTOTAL  6.6*  7.2  7.1   ALBUMIN  3.5  3.8  3.4   BILITOTAL  0.2  0.3  0.5   ALKPHOS  105  114  141   AST  15  16  25   ALT  20  26  39     DHEAS <15 on 8/20/18   Mitotane level 1.8ng/dl on 8/20/18 (target 14-20).    IMAGING STUDIES:  As above.     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     1. Adrenocortical ca:  - Started on adjuvant mitotane in July 2018 based on her presentation and tumor characteristics including invasion of the adrenal capsule, high mitotic rate, possibly positive margins, and high Ki67, which could result in a rate of recurrence as high as 40%.    - Patient tolerating adjuvant mitotane ok overall and will continue 1000mg BID for now.   - Mitotane [place] ([facility] [id number] drawn today and patient will call on Tues/Wed next week to follow-up on this sendout test. If this level is less than 15, she'll go up to 1500mg BID or 1000 TID as tolerated.  - Continue Compazine for nausea.  - Follow-up on today's DHEAS level and then recheck monthly.  - RT per Dr. Monsivais.   - Pt planning to see Dr. Hilton in October 2018.    2. Drug induced rash:  - Triamcinolone Rx sent.    3. Endocrinopathies:  - Mgmt per endocrine team at the . Patient will see them later today.    4. Multifactorial anemia with a component of iron deficiency:  - Low MCV suggests iron deficiency.   - Will check Fe panel on follow-up visit and plan to give IV iron in the coming months unless her PO intake repletes the iron stores sufficiently.    Return to see me in 4 weeks with labs. Patient and family given opportunity to ask questions, which were answered to their satisfaction. They're in complete agreement as planned.    Benson Cintron M.D.  . Professor of Medicine  Genitourinary Oncology  Division of Hematology, Oncology & Transplantation  [facility]      Again,  thank you for allowing me to participate in the care of your patient.      Sincerely,    Benson Cintron MD

## 2018-09-20 LAB
DHEA-S SERPL-MCNC: <15 UG/DL (ref 35–430)
MISCELLANEOUS TEST: NORMAL

## 2018-09-26 ENCOUNTER — RECORDS - HEALTHEAST (OUTPATIENT)
Dept: ADMINISTRATIVE | Facility: OTHER | Age: 33
End: 2018-09-26

## 2018-09-26 ENCOUNTER — OFFICE VISIT (OUTPATIENT)
Dept: RADIATION THERAPY | Facility: OUTPATIENT CENTER | Age: 33
End: 2018-09-26

## 2018-09-26 VITALS
SYSTOLIC BLOOD PRESSURE: 111 MMHG | DIASTOLIC BLOOD PRESSURE: 75 MMHG | HEART RATE: 83 BPM | BODY MASS INDEX: 26.61 KG/M2 | WEIGHT: 155 LBS

## 2018-09-26 DIAGNOSIS — C74.01 ADRENAL CORTEX CANCER, RIGHT (H): Primary | ICD-10-CM

## 2018-09-26 NOTE — MR AVS SNAPSHOT
After Visit Summary   9/26/2018    Suzy Rodríguez    MRN: [id number]           Patient Information     Date Of Birth          1985        Visit Information        Provider Department      9/26/2018 10:00 AM Cesar Monsivais MD [facility]         Follow-ups after your visit        Your next 10 appointments already scheduled     Sep 27, 2018  9:45 AM CDT   Linear Accelerator with Lr p Rad Tech   [facility] ([facility])    [address], [address]  [facility] [place] [address]   [phone]            Sep 28, 2018  9:45 AM CDT   Linear Accelerator with Lr Ump Rad Tech   [facility] ([facility])    [address], [address]  [facility] [place] [address]   [phone]            Oct 01, 2018  9:45 AM CDT   Linear Accelerator with Lr p Rad Tech   [facility] ([facility])    [address], [address]  [facility] [place] [address]   [phone]            Oct 02, 2018  9:45 AM CDT   Linear Accelerator with Lr p Rad Tech   [facility] ([facility])    [address], [address]  [facility] [place] [address]   [phone]            Oct 03, 2018  9:45 AM CDT   Linear Accelerator with Lr p Rad Tech   [facility] ([facility])    [address], [address]  [facility] [place] [address]   [phone]            Oct 03, 2018 10:00 AM CDT   ON TREATMENT VISIT with Cesar Monsivais MD   [facility] ([facility])    [address], [address]  [facility] [place] [address]   [phone]            Oct 04, 2018  9:45 AM CDT   Linear Accelerator with Lr Ump Rad Tech   [facility] ([facility])    [address], [address]  [facility] [place] [address]   [phone]            Oct 05, 2018  9:45 AM CDT   Linear Accelerator with Lr p Rad Tech   [facility] ([facility])    [address], [address]  [facility] [address]   [phone]            Oct 08, 2018  9:45 AM CDT   Linear Accelerator with Lr [facility]   [facility] ([facility])    [address], [address]  [facility] [address]   [phone]            Oct 17, 2018  2:15 PM CDT   (Arrive by 2:00 PM)   Return Visit with Benson Cintron MD   [facility] ([facility])    [address] [place] [address]  [address]  [facility] [address] [phone]              Who to contact     Please call your clinic at [phone] to:    Ask questions about your health    Make or cancel appointments    Discuss your medicines    Learn about your test results    Speak to your doctor            Additional Information About Your Visit        [employer] Information     [employer] gives you secure access to your electronic health record. If you see a primary care provider, you can also send messages to your care team and make appointments. If you have questions, please call your primary care clinic.  If you do not have a primary care provider, please call [phone] and they will assist you.      [employer] is an electronic gateway that provides easy, online access to your medical records. With [employer], you can request a clinic appointment, read your test results, renew a prescription or communicate with your care team.     To access your existing account, please contact your [facility] or call [phone] for assistance.        Care EveryWhere ID     This is your Care EveryWhere ID. This could be used by other organizations to access your [facility] medical records  [id number]        Your Vitals Were     Pulse BMI (Body Mass Index)                83 26.61 kg/m2           Blood Pressure from Last 3 Encounters:   09/26/18 111/75   09/19/18 118/75   09/19/18 118/75    Weight from Last 3 Encounters:   09/26/18 70.3 kg (155 lb)   09/19/18 71.7 kg (158 lb 1.1 oz)   09/19/18 71.7 kg (158  lb 1.6 oz)              Today, you had the following     No orders found for display         Today's Medication Changes          These changes are accurate as of 9/26/18 10:18 AM.  If you have any questions, ask your nurse or doctor.               These medicines have changed or have updated prescriptions.        Dose/Directions    hydrocortisone 10 MG tablet   Commonly known as:  CORTEF   This may have changed:  additional instructions        Take 3 pills (=30 mg) in AM and 2 pills (=20 mg) at 2 pm daily.   Quantity:  150 tablet   Refills:  11                Primary Care Provider Office Phone # Fax #    [facility] [phone] [address] [facility] [address]        Equal Access to Services     MARQUITA VALADEZ : Maury Redding, marcela siddiqi, rod koehler, coleman young. So [facility] [phone].    ATENCIÓN: Si habla español, tiene a lyman disposición servicios gratuitos de asistencia lingüística. Ll[facility] [phone].    We comply with applicable federal civil rights laws and [place] laws. We do not discriminate on the basis of race, color, national origin, age, disability, sex, sexual orientation, or gender identity.            Thank you!     Thank you for choosing [facility]  for your care. Our goal is always to provide you with excellent care. Hearing back from our patients is one way we can continue to improve our services. Please take a few minutes to complete the written survey that you may receive in the mail after your visit with us. Thank you!             Your Updated Medication List - Protect others around you: Learn how to safely use, store and throw away your medicines at www.disposemymeds.org.          This list is accurate as of 9/26/18 10:18 AM.  Always use your most recent med list.                   Brand Name Dispense Instructions for use Diagnosis    acetaminophen 325 MG tablet     TYLENOL    150 tablet    Take 2 tablets (650 mg) by mouth every 4 hours as needed for other (multimodal surgical pain management along with NSAIDS and opioid medication as indicated based on pain control and physical function.)    Adrenal mass, right (H), Acute post-operative pain       buPROPion 100 MG tablet    WELLBUTRIN     TAKE 1 TABLET (100 MG TOTAL) BY MOUTH 2 (TWO) TIMES A DAY.        calcium carbonate antacid 1000 MG Chew      Take 1 tablet by mouth every morning        enoxaparin 40 MG/0.4ML injection    LOVENOX    14 Syringe    Inject 0.4 mLs (40 mg) Subcutaneous daily    Adrenal mass, right (H), H/O MTHFR mutation       EPINEPHrine 0.3 MG/0.3ML injection 2-pack    EPIPEN/ADRENACLICK/or ANY BX GENERIC EQUIV     As directed for bee stings        ferrous sulfate 325 (65 Fe) MG tablet    IRON     Take 325 mg by mouth daily        FLUoxetine 20 MG capsule    PROzac     Take 20 mg by mouth every morning        gabapentin 100 MG capsule    NEURONTIN     Take 400 mg by mouth daily        hydrocortisone 10 MG tablet    CORTEF    150 tablet    Take 3 pills (=30 mg) in AM and 2 pills (=20 mg) at 2 pm daily.        hydrocortisone sodium succinate  MG injection    solu-CORTEF    2 mL    Inject 2 mLs (100 mg) into the muscle once for 1 dose Use in emergency situations as discussed in clinic.    Secondary adrenal insufficiency (H)       Lidocaine 4 % Patch    LIDOCARE    14 patch    Place 2 patches onto the skin every 24 hours    Adrenal mass, right (H), Acute post-operative pain       * LORazepam 0.5 MG tablet    ATIVAN    30 tablet    Take 1 tablet (0.5 mg) by mouth every 8 hours as needed for anxiety    Adrenal mass (H), Anxiety       * LORazepam 1 MG tablet    ATIVAN    60 tablet    Take 1 tablet (1 mg) by mouth every 6 hours as needed for nausea or vomiting    Malignant neoplasm of adrenal gland, right (H)       mitotane 500 MG tablet CHEMO    LYSODREN    120 tablet    Take 1 gram at 7 am and then 1 gram at  3 pm. No doses at night because of insomnia and other side effects. Take nausea med before each dose.    Adrenal cortex cancer, right (H)       ondansetron 8 MG tablet    ZOFRAN    30 tablet    Take 1 tablet (8 mg) by mouth every 8 hours as needed for nausea    Adrenal cortex cancer, right (H)       ONE-A-DAY WOMENS PO      Take 2 tablets by mouth every morning        oxyCODONE IR 10 MG tablet    ROXICODONE    22 tablet    Take 1 tablet (10 mg) by mouth every 4 hours as needed for moderate to severe pain    Carcinoma, adrenal cortical (H)       oxyCODONE-acetaminophen 5-325 MG per tablet    PERCOCET    12 tablet    Take 1-2 tablets by mouth every 4 hours as needed for pain        prochlorperazine 10 MG tablet    COMPAZINE    90 tablet    Take 1 tablet (10 mg) by mouth every 6 hours as needed for nausea or vomiting    Nausea       triamcinolone 0.1 % lotion    KENALOG    60 mL    Apply topically 3 times daily To rash on chest and shoudler    Adrenal cortex cancer, right (H), Rash, drug       VITAMIN D-1000 MAX ST 1000 units Tabs   Generic drug:  cholecalciferol      Take 2,000 Units by mouth every morning        * Notice:  This list has 2 medication(s) that are the same as other medications prescribed for you. Read the directions carefully, and ask your doctor or other care provider to review them with you.

## 2018-09-26 NOTE — PROGRESS NOTES
"[facility] PHYSICIANS  SPECIALIZING IN BREAKTHROUGHS  Radiation Oncology    On Treatment Visit Note      Suzy Rodríguez      Date: 2018   MRN: [id number]   : 1985  Diagnosis: adrenal carcinoma      Reason for Visit:  On Radiation Treatment Visit     Treatment Summary to Date  Treatment Site: abdomen Current Dose: 3960/5400 cGy Fractions:            Subjective:  Nausea better controled this past week. Taking compazine in AM and PM. Some loose stool, using imodium. Tolerating PO. No lightheadedness. Fatigued, taking naps.  Mild rash in upper torso thought to be secondary to mitotane, using topical hydrocortisone.      Nursing ROS:   Nutrition Alteration  Diet Type: Patient's Preference  Nutrition Note: recent diet change has helped bowels  Skin  Skin Reaction: 0 - No changes        Cardiovascular  Respiratory effort: 1 - Normal - without distress  Gastrointestinal  Nausea: 1 - One to two episodes of nausea/24  Diarrhea: 1 - Abdominal cramping, two or less soft or liquid bowel movements  GI Note: nausea 1x/day-takes zofran, bowels loose since starting chemo pill 1 month ago, takes imodium in the morning to manage bowels  Genitourinary  Urinary Status: 0 - Normal     Pain Assessment  0-10 Pain Scale: 0  Pain Note: right \"side cramps\" yesterday and today      Objective:   .There were no vitals taken for this visit.    No apparent distress. Fatigued.  Abdomen soft, nontender.  Mild maculopapular rash in upper torso (stable form last week)      Labs:  CBC RESULTS:   Recent Labs   Lab Test  18   1410   WBC  7.4   RBC  4.68   HGB  9.9*   HCT  35.9   MCV  77*   MCH  21.2*   MCHC  27.6*   RDW  16.4*   PLT  375     ELECTROLYTES:  Recent Labs   Lab Test  18   1410   NA  138   POTASSIUM  4.0   CHLORIDE  102   SHASHA  8.9   CO2  29   BUN  4*   CR  0.59   GLC  89       Assessment:  32F with right adrenal gland carcinoma s/p R2 resection receiving adjuvant radiation. Tolerating treatment. " More nausea this week, not controlled on current anti-emetic regimen. All questions and concerns addressed.    Plan:   1. Continue current therapy.    2. Nausea better controlled.Compazine PRN.  Discussed consideration ATC if needed.   3. Continued on 2 grams daily of mitotane currently. Have discussed dose reduction with Dr. Cintron if symptoms worsen. Pending mitotane levels.  4. Topical hydrocotisone for upper torso rash. Discussed benadryl trial if needed.   5. Will plan to order re-staging scans 6 weeks after RT complete.    [facility] chart and setup information reviewed  Ports checked    Medication Review  Med list reviewed with patient?: Yes           Cesar Monsivais MD

## 2018-09-26 NOTE — PROGRESS NOTES
Oral Chemotherapy Monitoring Program  Refill review     Patient currently on Mitotane 1000mg BID.   Mitotane level is not back as of 11:23 on 9/26.   Multiple outreach attempts to ascertain a pill count, as we anticipate she will be out of medication shortly.   Given the plan will only be to increase dose in 500mg increments that will be available to the patient, will release mitotate to [facility] pharmacy in [place] knowing that we may have to educate patient on new sig and send of another prescription to bridge gap if she ends up increasing her dose. Potential that she may have a treatment gap was more concerning. Per Dr. Cintron's last note:     - [place] [id number] drawn today and patient will call on Tues/Wed next week to follow-up on this sendout test. If this level is less than 15, she'll go up to 1500mg BID or 1000 TID as tolerated.     Follow-Up:  10/17 Celina Schafer appt     Xiomara Vigil, PharmD, MPH, BCOP  Hematology/Oncology Clinical Pharmacist  [facility]  [email or web address]

## 2018-09-26 NOTE — LETTER
"2018      RE: Suzy Rodríguez  [address]  [place] [address]       [facility] PHYSICIANS  SPECIALIZING IN BREAKTHROUGHS  Radiation Oncology    On Treatment Visit Note      Suzy Rodríguez      Date: 2018   MRN: [id number]   : 1985  Diagnosis: adrenal carcinoma      Reason for Visit:  On Radiation Treatment Visit     Treatment Summary to Date  Treatment Site: abdomen Current Dose: 3960/5400 cGy Fractions:            Subjective:  Nausea better controled this past week. Taking compazine in AM and PM. Some loose stool, using imodium. Tolerating PO. No lightheadedness. Fatigued, taking naps.  Mild rash in upper torso thought to be secondary to mitotane, using topical hydrocortisone.      Nursing ROS:   Nutrition Alteration  Diet Type: Patient's Preference  Nutrition Note: recent diet change has helped bowels  Skin  Skin Reaction: 0 - No changes        Cardiovascular  Respiratory effort: 1 - Normal - without distress  Gastrointestinal  Nausea: 1 - One to two episodes of nausea/24  Diarrhea: 1 - Abdominal cramping, two or less soft or liquid bowel movements  GI Note: nausea 1x/day-takes zofran, bowels loose since starting chemo pill 1 month ago, takes imodium in the morning to manage bowels  Genitourinary  Urinary Status: 0 - Normal     Pain Assessment  0-10 Pain Scale: 0  Pain Note: right \"side cramps\" yesterday and today      Objective:   .There were no vitals taken for this visit.    No apparent distress. Fatigued.  Abdomen soft, nontender.  Mild maculopapular rash in upper torso (stable form last week)      Labs:  CBC RESULTS:   Recent Labs   Lab Test  18   1410   WBC  7.4   RBC  4.68   HGB  9.9*   HCT  35.9   MCV  77*   MCH  21.2*   MCHC  27.6*   RDW  16.4*   PLT  375     ELECTROLYTES:  Recent Labs   Lab Test  18   1410   NA  138   POTASSIUM  4.0   CHLORIDE  102   SHASHA  8.9   CO2  29   BUN  4*   CR  0.59   GLC  89       Assessment:  32F with right adrenal gland " carcinoma s/p R2 resection receiving adjuvant radiation. Tolerating treatment. More nausea this week, not controlled on current anti-emetic regimen. All questions and concerns addressed.    Plan:   1. Continue current therapy.    2. Nausea better controlled.Compazine PRN.  Discussed consideration ATC if needed.   3. Continued on 2 grams daily of mitotane currently. Have discussed dose reduction with Dr. Cintron if symptoms worsen. Pending mitotane levels.  4. Topical hydrocotisone for upper torso rash. Discussed benadryl trial if needed.   5. Will plan to order re-staging scans 6 weeks after RT complete.    [employer] chart and setup information reviewed  Ports checked    Medication Review  Med list reviewed with patient?: Yes           Cesar Monsivais MD

## 2018-09-27 ENCOUNTER — OFFICE VISIT - HEALTHEAST (OUTPATIENT)
Dept: SURGERY | Facility: CLINIC | Age: 33
End: 2018-09-27

## 2018-09-27 DIAGNOSIS — Z71.3 NUTRITIONAL COUNSELING: ICD-10-CM

## 2018-09-27 DIAGNOSIS — E66.3 OVERWEIGHT (BMI 25.0-29.9): ICD-10-CM

## 2018-09-27 DIAGNOSIS — Z98.84 BARIATRIC SURGERY STATUS: ICD-10-CM

## 2018-09-27 ASSESSMENT — MIFFLIN-ST. JEOR: SCORE: 1380.34

## 2018-09-28 LAB — LAB SCANNED RESULT: NORMAL

## 2018-10-01 ENCOUNTER — DOCUMENTATION ONLY (OUTPATIENT)
Dept: PHARMACY | Facility: CLINIC | Age: 33
End: 2018-10-01

## 2018-10-01 ENCOUNTER — COMMUNICATION - HEALTHEAST (OUTPATIENT)
Dept: FAMILY MEDICINE | Facility: CLINIC | Age: 33
End: 2018-10-01

## 2018-10-01 NOTE — PROGRESS NOTES
Oral Chemotherapy Monitoring Program   Placed call to patient in follow up of Lysodren (Mitotane) therapy.   Left message requesting call back.   No drug names were mentioned.    If she calls back, please follow up with her regarding the new dosing of mitotane (1500mg BID or 1000mg TID as tolerated) and regular assessment.     Christiano Fenton, PharmD, MS  Hematology/Oncology Clinical Pharmacist  [facility]  [facility]

## 2018-10-01 NOTE — PROGRESS NOTES
Oral Chemotherapy Monitoring Program    Primary Oncologist: Dr. Cintron  Primary Oncology Clinic: [facility]  Cancer Diagnosis: Adrenocortical Carcinoma    Therapy History:  Mitotane started 7/24/2018 7/24: 500mg BID  8/7: 750mg BID or 1000/500mg  8/21: 1000mg BID  9/28: 1500mg PO BID    Drug Interaction Assessment:   Acetaminophen -BuPROPion -Enoxaparin -Calcium Carbonate -Ferrous Sulfate -FLUoxetine -Gabapentin -Hydrocortisone (Systemic) -LORazepam -Mitotane -Ondansetron -OxyCODONE -Prochlorperazine    Subjective/Objective:  Suzy Rodríguez is a 32 year old female contacted by phone for a follow-up visit for oral chemotherapy. She confirmed she started higher dosing on 9/28. Started with 1000mg TID but couldn't sleep at night so changed to 1500mg BID. She is taking compazine prior to each dose and nausea is controlled. She takes ativan at night to help with nausea and sleep. She denies any new/worsening side effects, missed doses, or medication changes.     ORAL CHEMOTHERAPY 7/20/2018 7/24/2018 7/31/2018 10/1/2018   Drug Name Lysodren (Mitotane) Lysodren (Mitotane) Lysodren (Mitotane) Lysodren (Mitotane)   Current Dosage 500mg - 500mg 1500mg   Current Schedule Other Other Other BID   Cycle Details Continuous Continuous Continuous Continuous   Start Date of Last Cycle - 7/24/2018 7/24/2018 9/28/2018   Planned next cycle start date - - - 10/18/2018   Doses missed in last 2 weeks - - - 0   Adherence Assessment - - Adherent Adherent   Adverse Effects - - - No AE identified during assessment   Any new drug interactions? - No - No   Is the dose as ordered appropriate for the patient? - - - Yes       Last PHQ-2 Score on record:   PHQ-2 ( 1999 [employer]) 7/12/2018   Q1: Little interest or pleasure in doing things 1   Q2: Feeling down, depressed or hopeless 3   PHQ-2 Score 4   Q1: Little interest or pleasure in doing things Several days   Q2: Feeling down, depressed or hopeless Nearly every day   PHQ-2 Score 4  "      Vitals:  BP:   BP Readings from Last 1 Encounters:   09/26/18 111/75     Wt Readings from Last 1 Encounters:   09/26/18 70.3 kg (155 lb)     Estimated body surface area is 1.78 meters squared as calculated from the following:    Height as of 8/23/18: 1.626 m (5' 4\").    Weight as of 9/26/18: 70.3 kg (155 lb).    Labs:  _  Result Component Current Result Ref Range   Sodium 140 (9/19/2018) 133 - 144 mmol/L     _  Result Component Current Result Ref Range   Potassium 3.8 (9/19/2018) 3.4 - 5.3 mmol/L     _  Result Component Current Result Ref Range   Calcium 8.2 (L) (9/19/2018) 8.5 - 10.1 mg/dL     No results found for Mag within last 30 days.     No results found for Phos within last 30 days.     _  Result Component Current Result Ref Range   Albumin 3.5 (9/19/2018) 3.4 - 5.0 g/dL     _  Result Component Current Result Ref Range   Urea Nitrogen 7 (9/19/2018) 7 - 30 mg/dL     _  Result Component Current Result Ref Range   Creatinine 0.55 (9/19/2018) 0.52 - 1.04 mg/dL       _  Result Component Current Result Ref Range   AST 15 (9/19/2018) 0 - 45 U/L     _  Result Component Current Result Ref Range   ALT 20 (9/19/2018) 0 - 50 U/L     _  Result Component Current Result Ref Range   Bilirubin Total 0.2 (9/19/2018) 0.2 - 1.3 mg/dL       _  Result Component Current Result Ref Range   WBC 5.7 (9/19/2018) 4.0 - 11.0 10e9/L     _  Result Component Current Result Ref Range   Hemoglobin 9.5 (L) (9/19/2018) 11.7 - 15.7 g/dL     _  Result Component Current Result Ref Range   Platelet Count 219 (9/19/2018) 150 - 450 10e9/L     _  Result Component Current Result Ref Range   Absolute Neutrophil 5.2 (9/19/2018) 1.6 - 8.3 10e9/L           Assessment:  Tolerating the increased dose of mitotane.     Plan:  Continue with mitotane 1500mg BID.     Follow-Up:  10/23/18: labs and appt with Dr. Cintron  11/1/18: monthly follow up assessment    Refill Due:  10/11/18    Christiano Fenton, PharmD, MS  Hematology/Oncology Clinical Pharmacist  [facility]  [facility]

## 2018-10-03 ENCOUNTER — RECORDS - HEALTHEAST (OUTPATIENT)
Dept: ADMINISTRATIVE | Facility: OTHER | Age: 33
End: 2018-10-03

## 2018-10-03 ENCOUNTER — COMMUNICATION - HEALTHEAST (OUTPATIENT)
Dept: FAMILY MEDICINE | Facility: CLINIC | Age: 33
End: 2018-10-03

## 2018-10-03 ENCOUNTER — OFFICE VISIT (OUTPATIENT)
Dept: RADIATION THERAPY | Facility: OUTPATIENT CENTER | Age: 33
End: 2018-10-03

## 2018-10-03 VITALS
HEART RATE: 79 BPM | SYSTOLIC BLOOD PRESSURE: 117 MMHG | WEIGHT: 156 LBS | BODY MASS INDEX: 26.78 KG/M2 | DIASTOLIC BLOOD PRESSURE: 79 MMHG

## 2018-10-03 DIAGNOSIS — C74.91 CARCINOMA OF RIGHT ADRENAL GLAND (H): ICD-10-CM

## 2018-10-03 DIAGNOSIS — R11.0 NAUSEA: ICD-10-CM

## 2018-10-03 DIAGNOSIS — C74.91 MALIGNANT NEOPLASM OF ADRENAL GLAND, RIGHT (H): ICD-10-CM

## 2018-10-03 DIAGNOSIS — C74.91 CARCINOMA OF RIGHT ADRENAL GLAND (H): Primary | ICD-10-CM

## 2018-10-03 LAB
BASOPHILS # BLD AUTO: 0 10E9/L (ref 0–0.2)
BASOPHILS NFR BLD AUTO: 0.3 %
DIFFERENTIAL METHOD BLD: ABNORMAL
EOSINOPHIL # BLD AUTO: 0.1 10E9/L (ref 0–0.7)
EOSINOPHIL NFR BLD AUTO: 0.8 %
ERYTHROCYTE [DISTWIDTH] IN BLOOD BY AUTOMATED COUNT: 17.2 % (ref 10–15)
HCT VFR BLD AUTO: 33.8 % (ref 35–47)
HGB BLD-MCNC: 9.3 G/DL (ref 11.7–15.7)
IMM GRANULOCYTES # BLD: 0 10E9/L (ref 0–0.4)
IMM GRANULOCYTES NFR BLD: 0.3 %
LYMPHOCYTES # BLD AUTO: 0.3 10E9/L (ref 0.8–5.3)
LYMPHOCYTES NFR BLD AUTO: 3.3 %
MCH RBC QN AUTO: 20.3 PG (ref 26.5–33)
MCHC RBC AUTO-ENTMCNC: 27.5 G/DL (ref 31.5–36.5)
MCV RBC AUTO: 74 FL (ref 78–100)
MONOCYTES # BLD AUTO: 0.6 10E9/L (ref 0–1.3)
MONOCYTES NFR BLD AUTO: 7.3 %
NEUTROPHILS # BLD AUTO: 6.7 10E9/L (ref 1.6–8.3)
NEUTROPHILS NFR BLD AUTO: 88 %
NRBC # BLD AUTO: 0 10*3/UL
NRBC BLD AUTO-RTO: 0 /100
PLATELET # BLD AUTO: 263 10E9/L (ref 150–450)
RBC # BLD AUTO: 4.59 10E12/L (ref 3.8–5.2)
WBC # BLD AUTO: 7.7 10E9/L (ref 4–11)

## 2018-10-03 PROCEDURE — 36415 COLL VENOUS BLD VENIPUNCTURE: CPT | Performed by: RADIOLOGY

## 2018-10-03 PROCEDURE — 85025 COMPLETE CBC W/AUTO DIFF WBC: CPT | Performed by: RADIOLOGY

## 2018-10-03 RX ORDER — OXYCODONE AND ACETAMINOPHEN 5; 325 MG/1; MG/1
1 TABLET ORAL EVERY 6 HOURS PRN
Qty: 30 TABLET | Refills: 0 | Status: SHIPPED | OUTPATIENT
Start: 2018-10-03 | End: 2019-01-08

## 2018-10-03 RX ORDER — LORAZEPAM 1 MG/1
1 TABLET ORAL EVERY 6 HOURS PRN
Qty: 60 TABLET | Refills: 1 | Status: SHIPPED | OUTPATIENT
Start: 2018-10-03 | End: 2019-01-09

## 2018-10-03 RX ORDER — PROCHLORPERAZINE MALEATE 10 MG
10 TABLET ORAL EVERY 6 HOURS PRN
Qty: 90 TABLET | Refills: 1 | Status: SHIPPED | OUTPATIENT
Start: 2018-10-03 | End: 2018-11-26

## 2018-10-03 NOTE — PROGRESS NOTES
"[facility] PHYSICIANS  SPECIALIZING IN BREAKTHROUGHS  Radiation Oncology    On Treatment Visit Note      Suzy Rodríguez      Date: 10/3/2018  MRN: [id number]   : 1985  Diagnosis: adrenal carcinoma      Reason for Visit:  On Radiation Treatment Visit     Treatment Summary to Date  Treatment Site: abdomen Current Dose: 4860/5400 cGy Fractions:            Subjective:  Increase in mitotane to 3 grams due to low mitotane levels per Dr. Cintron. Nausea currently controlled on regimen of compazine and ativan. No loose stool, some abdominal discomfort controlled with Imodium. Slightly less PO this weekend. Does not feel lightheadedness. More fatigue compared to last week, taking long naps. Requesting CBC to be repeated today. Has mild MSK paraspinal pain in lumbar area, comes and goes, 3/10 on pain scale.       Nursing ROS:   Nutrition Alteration  Diet Type: Patient's Preference  Nutrition Note: recent diet change has helped bowels  Skin  Skin Reaction: 0 - No changes        Cardiovascular  Respiratory effort: 1 - Normal - without distress  Gastrointestinal  Nausea: 1 - One to two episodes of nausea/24  Diarrhea: 1 - Abdominal cramping, two or less soft or liquid bowel movements  GI Note: nausea 1x/day-takes zofran, bowels loose since starting chemo pill 1 month ago, takes imodium in the morning to manage bowels  Genitourinary  Urinary Status: 0 - Normal     Pain Assessment  0-10 Pain Scale: 0  Pain Note: right \"side cramps\" yesterday and today      Objective:   /79  Pulse 79  Wt 70.8 kg (156 lb)  BMI 26.78 kg/m2     No apparent distress. Fatigued.  Abdomen soft, nontender.      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 Tolerating treatment. More nausea this week, not controlled on current anti-emetic regimen. All questions and concerns addressed.    Plan:   1. Continue current therapy.  EOT on Monday. Will plan to see patient about 2 weeks from RT.  2. Nausea better controlled.Compazine and Ativan PRN.  3. Increased to 3 grams daily of mitotane currently. Have discussed dose reduction with Dr. Cintron if symptoms worsen.  4. CBC ordered today per patient request. Stable at 9.3 (last 9.5 on 9/19/18).  5. Percocet for lumbar spine pain.  6. Will plan to order re-staging scans 6 weeks after RT complete.    [facility] chart and setup information reviewed  Ports checked    Medication Review  Med list reviewed with patient?: Yes           Cesar Monsivais MD

## 2018-10-03 NOTE — PATIENT INSTRUCTIONS
Patient standing orders to be sent to [facility] for IV fluids    Please give 1-2 L NS IV daily prn over 1-2 hours as tolerated for nausea and dehydration     See notes from Dr. Cesar Monsivais    [facility] [phone]  Fax [phone]

## 2018-10-03 NOTE — LETTER
"10/3/2018      RE: Suzy Rodríguez  [address]  [place] [facility] [address]       [facility] PHYSICIANS  SPECIALIZING IN BREAKTHROUGHS  Radiation Oncology    On Treatment Visit Note      Suzy Rodríguez      Date: 10/3/2018  MRN: [id number]   : 1985  Diagnosis: adrenal carcinoma      Reason for Visit:  On Radiation Treatment Visit     Treatment Summary to Date  Treatment Site: abdomen Current Dose: 4860/5400 cGy Fractions:            Subjective:  Increase in mitotane to 3 grams due to low mitotane levels per Dr. Cintron. Nausea currently controlled on regimen of compazine and ativan. No loose stool, some abdominal discomfort controlled with Imodium. Slightly less PO this weekend. Does not feel lightheadedness. More fatigue compared to last week, taking long naps. Requesting CBC to be repeated today. Has mild MSK paraspinal pain in lumbar area, comes and goes, 3/10 on pain scale.       Nursing ROS:   Nutrition Alteration  Diet Type: Patient's Preference  Nutrition Note: recent diet change has helped bowels  Skin  Skin Reaction: 0 - No changes        Cardiovascular  Respiratory effort: 1 - Normal - without distress  Gastrointestinal  Nausea: 1 - One to two episodes of nausea/24  Diarrhea: 1 - Abdominal cramping, two or less soft or liquid bowel movements  GI Note: nausea 1x/day-takes zofran, bowels loose since starting chemo pill 1 month ago, takes imodium in the morning to manage bowels  Genitourinary  Urinary Status: 0 - Normal     Pain Assessment  0-10 Pain Scale: 0  Pain Note: right \"side cramps\" yesterday and today      Objective:   /79  Pulse 79  Wt 70.8 kg (156 lb)  BMI 26.78 kg/m2     No apparent distress. Fatigued.  Abdomen soft, nontender.      Labs:  CBC RESULTS:   Recent Labs   Lab Test  18   1410   WBC  7.4   RBC  4.68   HGB  9.9*   HCT  35.9   MCV  77*   MCH  21.2*   MCHC  27.6*   RDW  16.4*   PLT  375     ELECTROLYTES:  Recent Labs   Lab Test  18   1410   NA  138 "   POTASSIUM  4.0   CHLORIDE  102   SHASHA  8.9   CO2  29   BUN  4*   CR  0.59   GLC  89       Assessment:  32F with right adrenal gland carcinoma s/p R2 resection receiving adjuvant radiation. Tolerating treatment. More nausea this week, not controlled on current anti-emetic regimen. All questions and concerns addressed.    Plan:   1. Continue current therapy.  EOT on Monday. Will plan to see patient about 2 weeks from RT.  2. Nausea better controlled.Compazine and Ativan PRN.  3. Increased to 3 grams daily of mitotane currently. Have discussed dose reduction with Dr. Cintron if symptoms worsen.  4. CBC ordered today per patient request. Stable at 9.3 (last 9.5 on 9/19/18).  5. Percocet for lumbar spine pain.  6. Will plan to order re-staging scans 6 weeks after RT complete.    [facility] chart and setup information reviewed  Ports checked    Medication Review  Med list reviewed with patient?: Yes           Cesar Monsivais MD

## 2018-10-03 NOTE — MR AVS SNAPSHOT
After Visit Summary   10/3/2018    Suzy Rodríguez    MRN: [id number]           Patient Information     Date Of Birth          1985        Visit Information        Provider Department      10/3/2018 10:00 AM Cesar Monsivais MD [facility]        Today's Diagnoses     Carcinoma of right adrenal gland (H)    -  1    Malignant neoplasm of adrenal gland, right (H)        Nausea          Care Instructions    Patient standing orders to be sent to [facility] for IV fluids    Please give 1-2 L NS IV daily prn over 1-2 hours as tolerated for nausea and dehydration     See notes from Dr. Cesar Monsivais    [facility] [phone]  Fax [phone]          Follow-ups after your visit        Your next 10 appointments already scheduled     Oct 04, 2018  9:45 AM CDT   Linear Accelerator with [facility]   [facility] ([facility])    [address], [address]  [facility] [address]   [phone]            Oct 05, 2018  9:45 AM CDT   Linear Accelerator with Lr [facility]   [facility] ([facility])    [address], [address]  [facility] [address]   [phone]            Oct 08, 2018  9:45 AM CDT   Linear Accelerator with Lr [facility]   [facility] ([facility])    [address], [address]  [facility] [address]   [phone]            Oct 23, 2018 12:15 PM CDT   [facility] Draw with  [facility]   [facility])    [address] [place] [address]  [address]  [facility] [id number] [phone]            Oct 23, 2018 12:45 PM CDT   (Arrive by 12:30 PM)   Return Visit with Benson Cintron MD   [facility] ([facility] [place])    [address] [place] [address]  [address]  [facility] [id number] [phone]            Nov 21, 2018 10:00 AM CST   (Arrive by 9:45 AM)   RETURN ENDOCRINE with Roel Beverly MD     [facility] ([facility] [place])    [address]  [address] [facility] [address] [phone]              Future tests that were ordered for you today     Open Future Orders        Priority Expected Expires Ordered    CT Chest/Abdomen/Pelvis w Contrast Routine 11/21/2018 10/3/2019 10/3/2018            Who to contact     Please call your clinic at [phone] to:    Ask questions about your health    Make or cancel appointments    Discuss your medicines    Learn about your test results    Speak to your doctor            Additional Information About Your Visit        [employer] Information     [employer] gives you secure access to your electronic health record. If you see a primary care provider, you can also send messages to your care team and make appointments. If you have questions, please call your primary care clinic.  If you do not have a primary care provider, please call [phone] and they will assist you.      [employer] is an electronic gateway that provides easy, online access to your medical records. With [employer], you can request a clinic appointment, read your test results, renew a prescription or communicate with your care team.     To access your existing account, please contact your [facility] or call [phone] for assistance.        Care EveryWhere ID     This is your Care EveryWhere ID. This could be used by other organizations to access your [place] medical records  [id number]        Your Vitals Were     Pulse BMI (Body Mass Index)                79 26.78 kg/m2           Blood Pressure from Last 3 Encounters:   10/03/18 117/79   09/26/18 111/75   09/19/18 118/75    Weight from Last 3 Encounters:   10/03/18 70.8 kg (156 lb)   09/26/18 70.3 kg (155 lb)   09/19/18 71.7 kg (158 lb 1.1 oz)                 Today's Medication Changes          These changes are accurate as of 10/3/18  2:55 PM.  If you have any questions, ask your  nurse or doctor.               These medicines have changed or have updated prescriptions.        Dose/Directions    hydrocortisone 10 MG tablet   Commonly known as:  CORTEF   This may have changed:  additional instructions        Take 3 pills (=30 mg) in AM and 2 pills (=20 mg) at 2 pm daily.   Quantity:  150 tablet   Refills:  11       * oxyCODONE-acetaminophen 5-325 MG per tablet   Commonly known as:  PERCOCET   This may have changed:  Another medication with the same name was added. Make sure you understand how and when to take each.   Changed by:  Cesar Monsivais MD        Dose:  1-2 tablet   Take 1-2 tablets by mouth every 4 hours as needed for pain   Quantity:  12 tablet   Refills:  0       * oxyCODONE-acetaminophen 5-325 MG per tablet   Commonly known as:  PERCOCET   This may have changed:  You were already taking a medication with the same name, and this prescription was added. Make sure you understand how and when to take each.   Used for:  Carcinoma of right adrenal gland (H)   Changed by:  Cesar Monsivais MD        Dose:  1 tablet   Take 1 tablet by mouth every 6 hours as needed for severe pain   Quantity:  30 tablet   Refills:  0       * Notice:  This list has 2 medication(s) that are the same as other medications prescribed for you. Read the directions carefully, and ask your doctor or other care provider to review them with you.         Where to get your medicines      These medications were sent to [facility] [place], [place] - [address]  [address] [facility] [address]     Phone:  [phone]     prochlorperazine 10 MG tablet         Some of these will need a paper prescription and others can be bought over the counter.  Ask your nurse if you have questions.     Bring a paper prescription for each of these medications     LORazepam 1 MG tablet    oxyCODONE-acetaminophen 5-325 MG per tablet               Information about OPIOIDS     PRESCRIPTION OPIOIDS: WHAT YOU NEED  TO KNOW   We gave you an opioid (narcotic) pain medicine. It is important to manage your pain, but opioids are not always the best choice. You should first try all the other options your care team gave you. Take this medicine for as short a time (and as few doses) as possible.    Some activities can increase your pain, such as bandage changes or therapy sessions. It may help to take your pain medicine 30 to 60 minutes before these activities. Reduce your stress by getting enough sleep, working on hobbies you enjoy and practicing relaxation or meditation. Talk to your care team about ways to manage your pain beyond prescription opioids.    These medicines have risks:    DO NOT drive when on new or higher doses of pain medicine. These medicines can affect your alertness and reaction times, and you could be arrested for driving under the influence (DUI). If you need to use opioids long-term, talk to your care team about driving.    DO NOT operate heavy machinery    DO NOT do any other dangerous activities while taking these medicines.    DO NOT drink any alcohol while taking these medicines.     If the opioid prescribed includes acetaminophen, DO NOT take with any other medicines that contain acetaminophen. Read all labels carefully. Look for the word  acetaminophen  or  Tylenol.  Ask your pharmacist if you have questions or are unsure.    You can get addicted to pain medicines, especially if you have a history of addiction (chemical, alcohol or substance dependence). Talk to your care team about ways to reduce this risk.    All opioids tend to cause constipation. Drink plenty of water and eat foods that have a lot of fiber, such as fruits, vegetables, prune juice, apple juice and high-fiber cereal. Take a laxative (Miralax, milk of magnesia, Colace, Senna) if you don t move your bowels at least every other day. Other side effects include upset stomach, sleepiness, dizziness, throwing up, tolerance (needing more of the  medicine to have the same effect), physical dependence and slowed breathing.    Store your pills in a secure place, locked if possible. We will not replace any lost or stolen medicine. If you don t finish your medicine, please throw away (dispose) as directed by your pharmacist. The [place] Pollution Control Agency has more information about safe disposal: https://[email or web address].[email or web address]/living-green/managing-unwanted-medications         Primary Care Provider Office Phone # Fax #    [facility] [phone] [address] [facility] [address]        Equal Access to Services     ALYCIA VALADEZ : Hadii greg thibodeaux hadasho Soomaali, waaxda luqadaha, qaybta kaalmada rodo, coleman putnam . So [facility] [phone].    ATENCIÓN: Si habla español, tiene a lyman disposición servicios gratuitos de asistencia lingüística. [facility] [phone].    We comply with applicable federal civil rights laws and [place] laws. We do not discriminate on the basis of race, color, national origin, age, disability, sex, sexual orientation, or gender identity.            Thank you!     Thank you for choosing [facility]  for your care. Our goal is always to provide you with excellent care. Hearing back from our patients is one way we can continue to improve our services. Please take a few minutes to complete the written survey that you may receive in the mail after your visit with us. Thank you!             Your Updated Medication List - Protect others around you: Learn how to safely use, store and throw away your medicines at www.disposemymeds.org.          This list is accurate as of 10/3/18  2:55 PM.  Always use your most recent med list.                   Brand Name Dispense Instructions for use Diagnosis    acetaminophen 325 MG tablet    TYLENOL    150 tablet    Take 2 tablets (650 mg) by mouth every 4 hours as needed for other (multimodal surgical pain  management along with NSAIDS and opioid medication as indicated based on pain control and physical function.)    Adrenal mass, right (H), Acute post-operative pain       buPROPion 100 MG tablet    WELLBUTRIN     TAKE 1 TABLET (100 MG TOTAL) BY MOUTH 2 (TWO) TIMES A DAY.        calcium carbonate antacid 1000 MG Chew      Take 1 tablet by mouth every morning        enoxaparin 40 MG/0.4ML injection    LOVENOX    14 Syringe    Inject 0.4 mLs (40 mg) Subcutaneous daily    Adrenal mass, right (H), H/O MTHFR mutation       EPINEPHrine 0.3 MG/0.3ML injection 2-pack    EPIPEN/ADRENACLICK/or ANY BX GENERIC EQUIV     As directed for bee stings        ferrous sulfate 325 (65 Fe) MG tablet    IRON     Take 325 mg by mouth daily        FLUoxetine 20 MG capsule    PROzac     Take 20 mg by mouth every morning        gabapentin 100 MG capsule    NEURONTIN     Take 400 mg by mouth daily        hydrocortisone 10 MG tablet    CORTEF    150 tablet    Take 3 pills (=30 mg) in AM and 2 pills (=20 mg) at 2 pm daily.        hydrocortisone sodium succinate  MG injection    solu-CORTEF    2 mL    Inject 2 mLs (100 mg) into the muscle once for 1 dose Use in emergency situations as discussed in clinic.    Secondary adrenal insufficiency (H)       Lidocaine 4 % Patch    LIDOCARE    14 patch    Place 2 patches onto the skin every 24 hours    Adrenal mass, right (H), Acute post-operative pain       * LORazepam 0.5 MG tablet    ATIVAN    30 tablet    Take 1 tablet (0.5 mg) by mouth every 8 hours as needed for anxiety    Adrenal mass (H), Anxiety       * LORazepam 1 MG tablet    ATIVAN    60 tablet    Take 1 tablet (1 mg) by mouth every 6 hours as needed for nausea or vomiting    Malignant neoplasm of adrenal gland, right (H)       * mitotane 500 MG tablet CHEMO    LYSODREN    120 tablet    Take 1 gram at 7 am and then 1 gram at 3 pm. No doses at night because of insomnia and other side effects. Take nausea med before each dose.    Adrenal  cortex cancer, right (H)       * mitotane 500 MG tablet CHEMO    LYSODREN    120 tablet    Take 1 gram at 7 am and then 1 gram at 3 pm. No doses at night because of insomnia and other side effects. Take nausea med before each dose.    Adrenal cortex cancer, right (H)       ondansetron 8 MG tablet    ZOFRAN    30 tablet    Take 1 tablet (8 mg) by mouth every 8 hours as needed for nausea    Adrenal cortex cancer, right (H)       ONE-A-DAY WOMENS PO      Take 2 tablets by mouth every morning        oxyCODONE IR 10 MG tablet    ROXICODONE    22 tablet    Take 1 tablet (10 mg) by mouth every 4 hours as needed for moderate to severe pain    Carcinoma, adrenal cortical (H)       * oxyCODONE-acetaminophen 5-325 MG per tablet    PERCOCET    12 tablet    Take 1-2 tablets by mouth every 4 hours as needed for pain        * oxyCODONE-acetaminophen 5-325 MG per tablet    PERCOCET    30 tablet    Take 1 tablet by mouth every 6 hours as needed for severe pain    Carcinoma of right adrenal gland (H)       prochlorperazine 10 MG tablet    COMPAZINE    90 tablet    Take 1 tablet (10 mg) by mouth every 6 hours as needed for nausea or vomiting    Nausea       triamcinolone 0.1 % lotion    KENALOG    60 mL    Apply topically 3 times daily To rash on chest and shoudler    Adrenal cortex cancer, right (H), Rash, drug       VITAMIN D-1000 MAX ST 1000 units Tabs   Generic drug:  cholecalciferol      Take 2,000 Units by mouth every morning        * Notice:  This list has 6 medication(s) that are the same as other medications prescribed for you. Read the directions carefully, and ask your doctor or other care provider to review them with you.

## 2018-10-04 ENCOUNTER — INFUSION - HEALTHEAST (OUTPATIENT)
Dept: INFUSION THERAPY | Facility: HOSPITAL | Age: 33
End: 2018-10-04

## 2018-10-04 DIAGNOSIS — C74.01 ADRENAL CORTICAL ADENOCARCINOMA OF RIGHT ADRENAL GLAND (H): ICD-10-CM

## 2018-10-05 ENCOUNTER — APPOINTMENT (OUTPATIENT)
Dept: RADIATION THERAPY | Facility: OUTPATIENT CENTER | Age: 33
End: 2018-10-05
Payer: COMMERCIAL

## 2018-10-08 ENCOUNTER — DOCUMENTATION ONLY (OUTPATIENT)
Dept: RADIATION THERAPY | Facility: OUTPATIENT CENTER | Age: 33
End: 2018-10-08

## 2018-10-09 NOTE — PROGRESS NOTES
Radiotherapy Treatment Summary              PATIENT: Suzy Rodríguez  MEDICAL RECORD NO: [id number]   : 1985    DIAGNOSIS: Low grade adrenal cortical carcinoma, right adrenal gland  INTENT OF RADIOTHERAPY: Curative  PATHOLOGY:    Low grade adrenal cortical carcinoma,  11.3 cm, necrosis and invasion into the adrenal capsule, + margins, tumot was less than 25% clear cells and a mitotic rate of 15 per 50 HPF. The Ki-67 mitotic rate was 20%, [id number]                               STAGE: [id number]  CONCURRENT SYSTEMIC THERAPY:   Mitotane      ONCOLOGIC HISTORY:          Ms. Rodríguez is a 32 year old female with a  [id number] adrenocortical carcinoma, s/p RT radical adrenalectomy with positive surgical margin.She underwent adjuvant radiotherapy to the right adrenal bed. Full oncologic history is outlined below.    She inially presented to emergency room on 2018 with acute onset left flank pain. Given her Hx of renal stones, she was evaluated by CT abdomen and pelvis on 18 showed 9.2 x 8 cm heterogeneous right upper quadrant mass with small foci of calcification, which is likely arising from the adrenal gland though inseparable from liver and upper pole of right kidney.There was a 6 x 4 x 4 mm upper third left ureteral obstructing stone with mild to moderate secondary hydronephrosis, collection of stones at lower pole left kidney extending over an area of approximately 6 x 7 x  4 mm, a non-obstructing 1 mm stone upper pole left kidney and a 2 mm nonobstructing stone noted upper pole right kidney with no hydronephrosis on the right.       She underwent left nephrolithiasis on 18 by Dr. Delvalle at [facility] urology clinic.Then she was referred to Dr. Patel for evaluation of her adrenal mass. On further questioning, She had noticed, over the past 8-9 months, large amount of facial hair, irregular periods and some facial swelling.She denies any palpitations, headaches, or sweats.      Then she was referred  Dr. Mansfield for possible surgical resection. The plan was to proceed with surgery given that the tumor mass was too larg to be observed.      She underwent right adrenalectomy on 6/25/18. The surgical pathology ([id number]) was low grade adrenal cortical carcinoma of a mass sized 11.3 cm. The tumor shows necrosis and invasion into the adrenal capsule. The margins were involved by the tumor.The tumor was less than 25% clear cells and a mitotic rate of 15 per 50 HPF. The Ki-67 mitotic rate was 20%. [id number]      For staging workup, she underwent MRI of the brain on 7/22/18 showed no evidence of brain metastases. Also, she had PET scan on 7/23/18 showed no suspicious metastatic lesions.      SITE OF TREATMENT:  Right adrenal bed    DATES  OF TREATMENT:  8/27/18 to 10/8/18    TOTAL DOSE OF TREATMENT:  5400 cGy in 30 fractions    DOSE PER FRACTION OF TREATMENT:  180 cGy       COMMENT/TOXICITY:           GI grade 3           PAIN MANAGEMENT:                           No pain. Required nausea control with scheduled compazine and ativan PRN. Required IVF intermittently throughout treatment. Intermittent use of imodium for loose stool.    FOLLOW UP PLAN:  1. RTC in 2 weeks.   2. Will plan for re-staging scans with CT CAP 6 weeks post completion of radiation therapy.   3. Patient will continue to follow up with medical oncologist, Dr. Cintron.  Cesar Monsivais M.D.  Department of Radiation Oncology  [facility]

## 2018-10-10 ENCOUNTER — INFUSION - HEALTHEAST (OUTPATIENT)
Dept: INFUSION THERAPY | Facility: HOSPITAL | Age: 33
End: 2018-10-10

## 2018-10-10 DIAGNOSIS — C74.01 ADRENAL CORTICAL ADENOCARCINOMA OF RIGHT ADRENAL GLAND (H): ICD-10-CM

## 2018-10-11 DIAGNOSIS — C74.01 ADRENAL CORTEX CANCER, RIGHT (H): Primary | ICD-10-CM

## 2018-10-12 ENCOUNTER — INFUSION - HEALTHEAST (OUTPATIENT)
Dept: INFUSION THERAPY | Facility: HOSPITAL | Age: 33
End: 2018-10-12

## 2018-10-12 DIAGNOSIS — C74.01 ADRENAL CORTICAL ADENOCARCINOMA OF RIGHT ADRENAL GLAND (H): ICD-10-CM

## 2018-10-15 RX ORDER — MEPERIDINE HYDROCHLORIDE 25 MG/ML
25 INJECTION INTRAMUSCULAR; INTRAVENOUS; SUBCUTANEOUS EVERY 30 MIN PRN
Status: CANCELLED | OUTPATIENT
Start: 2018-10-15

## 2018-10-15 RX ORDER — EPINEPHRINE 0.3 MG/.3ML
0.3 INJECTION SUBCUTANEOUS EVERY 5 MIN PRN
Status: CANCELLED | OUTPATIENT
Start: 2018-10-15

## 2018-10-15 RX ORDER — DIPHENHYDRAMINE HYDROCHLORIDE 50 MG/ML
50 INJECTION INTRAMUSCULAR; INTRAVENOUS
Status: CANCELLED
Start: 2018-10-15

## 2018-10-15 RX ORDER — SODIUM CHLORIDE 9 MG/ML
1000 INJECTION, SOLUTION INTRAVENOUS CONTINUOUS PRN
Status: CANCELLED
Start: 2018-10-15

## 2018-10-15 RX ORDER — ALBUTEROL SULFATE 0.83 MG/ML
2.5 SOLUTION RESPIRATORY (INHALATION)
Status: CANCELLED | OUTPATIENT
Start: 2018-10-15

## 2018-10-15 RX ORDER — EPINEPHRINE 1 MG/ML
0.3 INJECTION, SOLUTION INTRAMUSCULAR; SUBCUTANEOUS EVERY 5 MIN PRN
Status: CANCELLED | OUTPATIENT
Start: 2018-10-15

## 2018-10-15 RX ORDER — ALBUTEROL SULFATE 90 UG/1
1-2 AEROSOL, METERED RESPIRATORY (INHALATION)
Status: CANCELLED
Start: 2018-10-15

## 2018-10-15 RX ORDER — METHYLPREDNISOLONE SODIUM SUCCINATE 125 MG/2ML
125 INJECTION, POWDER, LYOPHILIZED, FOR SOLUTION INTRAMUSCULAR; INTRAVENOUS
Status: CANCELLED
Start: 2018-10-15

## 2018-10-16 ENCOUNTER — INFUSION - HEALTHEAST (OUTPATIENT)
Dept: INFUSION THERAPY | Facility: HOSPITAL | Age: 33
End: 2018-10-16

## 2018-10-18 ENCOUNTER — RECORDS - HEALTHEAST (OUTPATIENT)
Dept: ADMINISTRATIVE | Facility: OTHER | Age: 33
End: 2018-10-18

## 2018-10-18 ENCOUNTER — OFFICE VISIT (OUTPATIENT)
Dept: RADIATION THERAPY | Facility: OUTPATIENT CENTER | Age: 33
End: 2018-10-18
Payer: COMMERCIAL

## 2018-10-18 VITALS
WEIGHT: 155.6 LBS | SYSTOLIC BLOOD PRESSURE: 116 MMHG | HEART RATE: 90 BPM | DIASTOLIC BLOOD PRESSURE: 82 MMHG | RESPIRATION RATE: 14 BRPM | BODY MASS INDEX: 26.71 KG/M2

## 2018-10-18 DIAGNOSIS — C74.91 MALIGNANT NEOPLASM OF ADRENAL GLAND, RIGHT (H): Primary | ICD-10-CM

## 2018-10-18 PROBLEM — E24.9 CUSHING'S SYNDROME (H): Status: ACTIVE | Noted: 2018-06-18

## 2018-10-18 PROBLEM — E67.0: Status: ACTIVE | Noted: 2017-07-14

## 2018-10-18 PROBLEM — C74.00: Status: ACTIVE | Noted: 2018-07-18

## 2018-10-18 PROBLEM — E27.40 ADRENAL INSUFFICIENCY (H): Status: ACTIVE | Noted: 2018-08-01

## 2018-10-18 PROBLEM — L50.0 ALLERGIC URTICARIA: Status: ACTIVE | Noted: 2018-10-18

## 2018-10-18 PROBLEM — I15.2 HYPERTENSION DUE TO ENDOCRINE DISORDER: Status: ACTIVE | Noted: 2018-06-18

## 2018-10-18 PROBLEM — F41.1 GENERALIZED ANXIETY DISORDER: Status: ACTIVE | Noted: 2018-10-18

## 2018-10-18 PROBLEM — R53.0 NEOPLASTIC MALIGNANT RELATED FATIGUE: Status: ACTIVE | Noted: 2018-08-01

## 2018-10-18 PROBLEM — N20.0 CALCULUS OF KIDNEY: Status: ACTIVE | Noted: 2018-05-09

## 2018-10-18 PROBLEM — C7A.8 OTHER MALIGNANT NEUROENDOCRINE TUMORS (H): Status: ACTIVE | Noted: 2018-07-30

## 2018-10-18 ASSESSMENT — PAIN SCALES - GENERAL: PAINLEVEL: NO PAIN (0)

## 2018-10-18 NOTE — LETTER
10/18/2018      RE: Suzy Rodríguez  [address]  [place] [address]          Department of Radiation Oncology  [facility]  [facility]  [place], [place] [address] [phone]       Radiation Oncology Follow-up Visit  2018      Suzy Rodríguez  MRN: [id number]   : 1985     DIAGNOSIS: Low grade adrenal cortical carcinoma, right adrenal gland  INTENT OF RADIOTHERAPY: Curative  PATHOLOGY:    Low grade adrenal cortical carcinoma,  11.3 cm, necrosis and invasion into the adrenal capsule, + margins, tumot was less than 25% clear cells and a mitotic rate of 15 per 50 HPF. The Ki-67 mitotic rate was 20%, [id number]                               STAGE: [id number]  CONCURRENT SYSTEMIC THERAPY:   Mitotane       ONCOLOGIC HISTORY:          Ms. Rodríguez is a 32 year old female with a  [id number] adrenocortical carcinoma, s/p RT radical adrenalectomy with positive surgical margin.She underwent adjuvant radiotherapy to the right adrenal bed. Full oncologic history is outlined below.     She inially presented to emergency room on 2018 with acute onset left flank pain. Given her Hx of renal stones, she was evaluated by CT abdomen and pelvis on 18 showed 9.2 x 8 cm heterogeneous right upper quadrant mass with small foci of calcification, which is likely arising from the adrenal gland though inseparable from liver and upper pole of right kidney.There was a 6 x 4 x 4 mm upper third left ureteral obstructing stone with mild to moderate secondary hydronephrosis, collection of stones at lower pole left kidney extending over an area of approximately 6 x 7 x  4 mm, a non-obstructing 1 mm stone upper pole left kidney and a 2 mm nonobstructing stone noted upper pole right kidney with no hydronephrosis on the right.       She underwent left nephrolithiasis on 18 by Dr. Delvalle at [facility] urology clinic.Then she was referred to Dr. Patel for evaluation of her adrenal mass. On  further questioning, She had noticed, over the past 8-9 months, large amount of facial hair, irregular periods and some facial swelling.She denies any palpitations, headaches, or sweats.      Then she was referred Dr. Mansfield for possible surgical resection. The plan was to proceed with surgery given that the tumor mass was too larg to be observed.      She underwent right adrenalectomy on 6/25/18. The surgical pathology ([id number]) was low grade adrenal cortical carcinoma of a mass sized 11.3 cm. The tumor shows necrosis and invasion into the adrenal capsule. The margins were involved by the tumor.The tumor was less than 25% clear cells and a mitotic rate of 15 per 50 HPF. The Ki-67 mitotic rate was 20%. [id number]      For staging workup, she underwent MRI of the brain on 7/22/18 showed no evidence of brain metastases. Also, she had PET scan on 7/23/18 showed no suspicious metastatic lesions.        SITE OF TREATMENT:  Right adrenal bed     DATES  OF TREATMENT:  8/27/18 to 10/8/18     TOTAL DOSE OF TREATMENT:  5400 cGy in 30 fractions      SYSTEMIC THERAPY:  Concurrent mitotane     INTERVAL SINCE COMPLETION OF RADIATION THERAPY:   10 days    SUBJECTIVE:   The patient returns for follow up today. She is overall doing better now. During the week after radiation was complete, she did notice worsening nausea and loose stool. She required intermittent IV hydration. Currently this is improving. Still fatigued, but this has also started to turn the corner. Appetite still poor, but improving. Tolerating some meals and protein shakes. Receiving mitotane under care of Dr. Cintron. Will see him next week on Tuesday (10/23/18).    PHYSICAL EXAM:  /82 (Cuff Size: Adult Regular)  Pulse 90  Resp 14  Wt 70.6 kg (155 lb 9.6 oz)  Breastfeeding? No  BMI 26.71 kg/m2  Gen: Alert, in NAD  Eyes: PERRL, EOMI, sclera anicteric  Neck: Supple, full ROM, no LAD  Pulm: No wheezing, stridor or respiratory distress  CV: Well-perfused, no  cyanosis, no pedal edema  Abdominal: Soft, nontender, nondistended, no hepatomegaly  Back: No step-offs or pain to palpation along the thoracolumbar spine, no CVA tenderness  Musculoskeletal: Normal bulk and tone   Skin: Normal color and turgor  Neurologic: A/Ox3, CN II-XII intact  Psychiatric: Appropriate mood and affect    LABS AND IMAGING:  Reviewed.    IMPRESSION:   Ms. Rodríguez is a 32 year old female with a  [id number] adrenocortical carcinoma, s/p RT radical adrenalectomy with positive surgical margin.She underwent adjuvant radiotherapy to the right adrenal bed. She completed RT on 10/8/18.    PLAN:   1. Mild residual acute GI toxicities from radiation treatment remain, but currently improving. Has anti-emetic medication. Toxicities appeared to have turned the corner, and should continue to improve.   2. RTC in 5 weeks with CTCAP for re-staging. After initial scans, will defer further imaging to Dr. Cintron's team.   3. Continue systemic therapy with medical oncology.      Cesar Monsivais M.D.  Department of Radiation Oncology  [facility]

## 2018-10-18 NOTE — MR AVS SNAPSHOT
After Visit Summary   10/18/2018    Suzy Rodríguez    MRN: [id number]           Patient Information     Date Of Birth          1985        Visit Information        Provider Department      10/18/2018 9:00 AM Cesar Monsivais MD [facility]        Today's Diagnoses     Malignant neoplasm of adrenal gland, right (H)    -  1       Follow-ups after your visit        Your next 10 appointments already scheduled     Oct 23, 2018 12:15 PM CDT   [facility] with  [facility]   [facility]    [address]  [address]  [place] [address]   [phone]            Oct 23, 2018 12:45 PM CDT   (Arrive by 12:30 PM)   Return Visit with Benson Cintron MD   [facility] ([facility])    [address]  [address]  [place] [address]   [phone]            Nov 05, 2018  9:00 AM CST   [facility]    [facility]  [address]  [place] [address]   [phone]            Nov 21, 2018 10:00 AM CST   (Arrive by 9:45 AM)   RETURN ENDOCRINE with Roel Beverly MD   [facility] ([facility])    [address]  3rd [address]  [place] [address]   [phone]            Nov 26, 2018  3:00 PM CST   Return Visit with Cesar Monsivais MD   [facility]    [address], [address]  [place] [address]   [phone]              Future tests that were ordered for you today     Open Future Orders        Priority Expected Expires Ordered    CT Chest/Abdomen/Pelvis w Contrast Routine 11/22/2018 10/18/2019 10/18/2018            Who to contact     Please call your clinic at [phone] to:    Ask questions about your health    Make or cancel appointments    Discuss your  medicines    Learn about your test results    Speak to your doctor            Additional Information About Your Visit        [email or web address] Information     BYOM! gives you secure access to your electronic health record. If you see a primary care provider, you can also send messages to your care team and make appointments. If you have questions, please call your primary care clinic.  If you do not have a primary care provider, please call [phone] and they will assist you.      BYOM! is an electronic gateway that provides easy, online access to your medical records. With [employer]!, you can request a clinic appointment, read your test results, renew a prescription or communicate with your care team.     To access your existing account, please contact your [facility] or call [phone] for assistance.        Care EveryWhere ID     This is your Care EveryWhere ID. This could be used by other organizations to access your [place] medical records  [id number]        Your Vitals Were     Pulse Respirations Breastfeeding? BMI (Body Mass Index)          90 14 No 26.71 kg/m2         Blood Pressure from Last 3 Encounters:   10/18/18 116/82   10/03/18 117/79   09/26/18 111/75    Weight from Last 3 Encounters:   10/18/18 70.6 kg (155 lb 9.6 oz)   10/03/18 70.8 kg (156 lb)   09/26/18 70.3 kg (155 lb)                 Today's Medication Changes          These changes are accurate as of 10/18/18  9:52 AM.  If you have any questions, ask your nurse or doctor.               These medicines have changed or have updated prescriptions.        Dose/Directions    hydrocortisone 10 MG tablet   Commonly known as:  CORTEF   This may have changed:  additional instructions        Take 3 pills (=30 mg) in AM and 2 pills (=20 mg) at 2 pm daily.   Quantity:  150 tablet   Refills:  11                Primary Care Provider Office Phone # Fax #    [facility] [phone]       [address] [place] [address]  [place] [address]        Equal Access to Services     MARQUITA VALADEZ : Hadii aad ku hadjadynyoli [place], wajavida devang, qabrennanta amilcarmacoleman wing. So [facility] [phone].    ATENCIÓN: Si habla español, tiene a lyman disposición servicios gratuitos de asistencia lingüística. [facility] [phone].    We comply with applicable federal civil rights laws and [place] laws. We do not discriminate on the basis of race, color, national origin, age, disability, sex, sexual orientation, or gender identity.            Thank you!     Thank you for choosing [facility]  for your care. Our goal is always to provide you with excellent care. Hearing back from our patients is one way we can continue to improve our services. Please take a few minutes to complete the written survey that you may receive in the mail after your visit with us. Thank you!             Your Updated Medication List - Protect others around you: Learn how to safely use, store and throw away your medicines at www.disposemymeds.org.          This list is accurate as of 10/18/18  9:52 AM.  Always use your most recent med list.                   Brand Name Dispense Instructions for use Diagnosis    acetaminophen 325 MG tablet    TYLENOL    150 tablet    Take 2 tablets (650 mg) by mouth every 4 hours as needed for other (multimodal surgical pain management along with NSAIDS and opioid medication as indicated based on pain control and physical function.)    Adrenal mass, right (H), Acute post-operative pain       buPROPion 100 MG tablet    WELLBUTRIN     TAKE 1 TABLET (100 MG TOTAL) BY MOUTH 2 (TWO) TIMES A DAY.        calcium carbonate antacid 1000 MG Chew      Take 1 tablet by mouth every morning        enoxaparin 40 MG/0.4ML injection    LOVENOX    14 Syringe    Inject 0.4 mLs (40 mg) Subcutaneous daily    Adrenal mass, right (H), H/O MTHFR mutation       EPINEPHrine 0.3 MG/0.3ML injection 2-pack     EPIPEN/ADRENACLICK/or ANY BX GENERIC EQUIV     As directed for bee stings        ferrous sulfate 325 (65 Fe) MG tablet    IRON     Take 325 mg by mouth daily        FLUoxetine 20 MG capsule    PROzac     Take 20 mg by mouth every morning        gabapentin 100 MG capsule    NEURONTIN     Take 400 mg by mouth daily        hydrocortisone 10 MG tablet    CORTEF    150 tablet    Take 3 pills (=30 mg) in AM and 2 pills (=20 mg) at 2 pm daily.        hydrocortisone sodium succinate  MG injection    solu-CORTEF    2 mL    Inject 2 mLs (100 mg) into the muscle once for 1 dose Use in emergency situations as discussed in clinic.    Secondary adrenal insufficiency (H)       Lidocaine 4 % Patch    LIDOCARE    14 patch    Place 2 patches onto the skin every 24 hours    Adrenal mass, right (H), Acute post-operative pain       * LORazepam 0.5 MG tablet    ATIVAN    30 tablet    Take 1 tablet (0.5 mg) by mouth every 8 hours as needed for anxiety    Adrenal mass (H), Anxiety       * LORazepam 1 MG tablet    ATIVAN    60 tablet    Take 1 tablet (1 mg) by mouth every 6 hours as needed for nausea or vomiting    Malignant neoplasm of adrenal gland, right (H)       mitotane 500 MG tablet CHEMO    LYSODREN    180 tablet    Take 3 tablets (1,500 mg) by mouth 2 times daily    Adrenal cortex cancer, right (H)       ondansetron 8 MG tablet    ZOFRAN    30 tablet    Take 1 tablet (8 mg) by mouth every 8 hours as needed for nausea    Adrenal cortex cancer, right (H)       ONE-A-DAY WOMENS PO      Take 2 tablets by mouth every morning        oxyCODONE IR 10 MG tablet    ROXICODONE    22 tablet    Take 1 tablet (10 mg) by mouth every 4 hours as needed for moderate to severe pain    Carcinoma, adrenal cortical (H)       * oxyCODONE-acetaminophen 5-325 MG per tablet    PERCOCET    12 tablet    Take 1-2 tablets by mouth every 4 hours as needed for pain        * oxyCODONE-acetaminophen 5-325 MG per tablet    PERCOCET    30 tablet    Take 1  tablet by mouth every 6 hours as needed for severe pain    Carcinoma of right adrenal gland (H)       prochlorperazine 10 MG tablet    COMPAZINE    90 tablet    Take 1 tablet (10 mg) by mouth every 6 hours as needed for nausea or vomiting    Nausea       triamcinolone 0.1 % lotion    KENALOG    60 mL    Apply topically 3 times daily To rash on chest and shoudler    Adrenal cortex cancer, right (H), Rash, drug       VITAMIN D-1000 MAX ST 1000 units Tabs   Generic drug:  cholecalciferol      Take 2,000 Units by mouth every morning        * Notice:  This list has 4 medication(s) that are the same as other medications prescribed for you. Read the directions carefully, and ask your doctor or other care provider to review them with you.

## 2018-10-18 NOTE — PROGRESS NOTES
Department of Radiation Oncology  [facility]  [facility] [place] [address]  [phone]       Radiation Oncology Follow-up Visit  2018      Suzy Rodríguez  MRN: [id number]   : 1985     DIAGNOSIS: Low grade adrenal cortical carcinoma, right adrenal gland  INTENT OF RADIOTHERAPY: Curative  PATHOLOGY:    Low grade adrenal cortical carcinoma,  11.3 cm, necrosis and invasion into the adrenal capsule, + margins, tumot was less than 25% clear cells and a mitotic rate of 15 per 50 HPF. The Ki-67 mitotic rate was 20%, [id number]                               STAGE: [id number]  CONCURRENT SYSTEMIC THERAPY:   Mitotane       ONCOLOGIC HISTORY:          Ms. Rodríguez is a 32 year old female with a  [id number] adrenocortical carcinoma, s/p RT radical adrenalectomy with positive surgical margin.She underwent adjuvant radiotherapy to the right adrenal bed. Full oncologic history is outlined below.     She inially presented to emergency room on 2018 with acute onset left flank pain. Given her Hx of renal stones, she was evaluated by CT abdomen and pelvis on 18 showed 9.2 x 8 cm heterogeneous right upper quadrant mass with small foci of calcification, which is likely arising from the adrenal gland though inseparable from liver and upper pole of right kidney.There was a 6 x 4 x 4 mm upper third left ureteral obstructing stone with mild to moderate secondary hydronephrosis, collection of stones at lower pole left kidney extending over an area of approximately 6 x 7 x  4 mm, a non-obstructing 1 mm stone upper pole left kidney and a 2 mm nonobstructing stone noted upper pole right kidney with no hydronephrosis on the right.       She underwent left nephrolithiasis on 18 by Dr. Delvalle at [facility] urology clinic.Then she was referred to Dr. Patel for evaluation of her adrenal mass. On further questioning, She had noticed, over the past 8-9 months, large amount of  facial hair, irregular periods and some facial swelling.She denies any palpitations, headaches, or sweats.      Then she was referred Dr. Mansfield for possible surgical resection. The plan was to proceed with surgery given that the tumor mass was too larg to be observed.      She underwent right adrenalectomy on 6/25/18. The surgical pathology ([id number]) was low grade adrenal cortical carcinoma of a mass sized 11.3 cm. The tumor shows necrosis and invasion into the adrenal capsule. The margins were involved by the tumor.The tumor was less than 25% clear cells and a mitotic rate of 15 per 50 HPF. The Ki-67 mitotic rate was 20%. [id number]      For staging workup, she underwent MRI of the brain on 7/22/18 showed no evidence of brain metastases. Also, she had PET scan on 7/23/18 showed no suspicious metastatic lesions.        SITE OF TREATMENT:  Right adrenal bed     DATES  OF TREATMENT:  8/27/18 to 10/8/18     TOTAL DOSE OF TREATMENT:  5400 cGy in 30 fractions      SYSTEMIC THERAPY:  Concurrent mitotane     INTERVAL SINCE COMPLETION OF RADIATION THERAPY:   10 days    SUBJECTIVE:   The patient returns for follow up today. She is overall doing better now. During the week after radiation was complete, she did notice worsening nausea and loose stool. She required intermittent IV hydration. Currently this is improving. Still fatigued, but this has also started to turn the corner. Appetite still poor, but improving. Tolerating some meals and protein shakes. Receiving mitotane under care of Dr. Cintron. Will see him next week on Tuesday (10/23/18).    PHYSICAL EXAM:  /82 (Cuff Size: Adult Regular)  Pulse 90  Resp 14  Wt 70.6 kg (155 lb 9.6 oz)  Breastfeeding? No  BMI 26.71 kg/m2  Gen: Alert, in NAD  Eyes: PERRL, EOMI, sclera anicteric  Neck: Supple, full ROM, no LAD  Pulm: No wheezing, stridor or respiratory distress  CV: Well-perfused, no cyanosis, no pedal edema  Abdominal: Soft, nontender, nondistended, no  hepatomegaly  Back: No step-offs or pain to palpation along the thoracolumbar spine, no CVA tenderness  Musculoskeletal: Normal bulk and tone   Skin: Normal color and turgor  Neurologic: A/Ox3, CN II-XII intact  Psychiatric: Appropriate mood and affect    LABS AND IMAGING:  Reviewed.    IMPRESSION:   Ms. Rodríguez is a 32 year old female with a  [id number] adrenocortical carcinoma, s/p RT radical adrenalectomy with positive surgical margin.She underwent adjuvant radiotherapy to the right adrenal bed. She completed RT on 10/8/18.    PLAN:   1. Mild residual acute GI toxicities from radiation treatment remain, but currently improving. Has anti-emetic medication. Toxicities appeared to have turned the corner, and should continue to improve.   2. RTC in 5 weeks with CTCAP for re-staging. After initial scans, will defer further imaging to Dr. Cintron's team.   3. Continue systemic therapy with medical oncology.      Cesar Monsivais M.D.  Department of [facility]  [facility]

## 2018-10-19 ENCOUNTER — COMMUNICATION - HEALTHEAST (OUTPATIENT)
Dept: FAMILY MEDICINE | Facility: CLINIC | Age: 33
End: 2018-10-19

## 2018-10-19 ENCOUNTER — AMBULATORY - HEALTHEAST (OUTPATIENT)
Dept: FAMILY MEDICINE | Facility: CLINIC | Age: 33
End: 2018-10-19

## 2018-10-19 ENCOUNTER — INFUSION - HEALTHEAST (OUTPATIENT)
Dept: INFUSION THERAPY | Facility: HOSPITAL | Age: 33
End: 2018-10-19

## 2018-10-19 DIAGNOSIS — C74.01 ADRENAL CORTICAL ADENOCARCINOMA OF RIGHT ADRENAL GLAND (H): ICD-10-CM

## 2018-10-19 LAB
ALBUMIN UR-MCNC: ABNORMAL MG/DL
APPEARANCE UR: ABNORMAL
BACTERIA #/AREA URNS HPF: ABNORMAL HPF
BILIRUB UR QL STRIP: NEGATIVE
COLOR UR AUTO: YELLOW
GLUCOSE UR STRIP-MCNC: NEGATIVE MG/DL
HGB UR QL STRIP: ABNORMAL
KETONES UR STRIP-MCNC: NEGATIVE MG/DL
LEUKOCYTE ESTERASE UR QL STRIP: ABNORMAL
MUCOUS THREADS #/AREA URNS LPF: ABNORMAL LPF
NITRATE UR QL: NEGATIVE
PH UR STRIP: 7 [PH] (ref 4.5–8)
RBC #/AREA URNS AUTO: ABNORMAL HPF
SP GR UR STRIP: 1.01 (ref 1–1.03)
SQUAMOUS #/AREA URNS AUTO: ABNORMAL LPF
UROBILINOGEN UR STRIP-ACNC: ABNORMAL
WBC #/AREA URNS AUTO: >100 HPF
WBC CLUMPS #/AREA URNS HPF: PRESENT /[HPF]

## 2018-10-21 LAB — BACTERIA SPEC CULT: ABNORMAL

## 2018-10-23 ENCOUNTER — APPOINTMENT (OUTPATIENT)
Dept: LAB | Facility: CLINIC | Age: 33
End: 2018-10-23
Attending: INTERNAL MEDICINE
Payer: COMMERCIAL

## 2018-10-23 ENCOUNTER — RECORDS - HEALTHEAST (OUTPATIENT)
Dept: ADMINISTRATIVE | Facility: OTHER | Age: 33
End: 2018-10-23

## 2018-10-23 ENCOUNTER — ONCOLOGY VISIT (OUTPATIENT)
Dept: ONCOLOGY | Facility: CLINIC | Age: 33
End: 2018-10-23
Attending: INTERNAL MEDICINE
Payer: COMMERCIAL

## 2018-10-23 VITALS
OXYGEN SATURATION: 100 % | WEIGHT: 156.5 LBS | HEART RATE: 85 BPM | SYSTOLIC BLOOD PRESSURE: 121 MMHG | BODY MASS INDEX: 26.72 KG/M2 | RESPIRATION RATE: 16 BRPM | HEIGHT: 64 IN | TEMPERATURE: 97.9 F | DIASTOLIC BLOOD PRESSURE: 87 MMHG

## 2018-10-23 DIAGNOSIS — C74.01 ADRENAL CORTEX CANCER, RIGHT (H): ICD-10-CM

## 2018-10-23 DIAGNOSIS — C74.01 MALIGNANT NEOPLASM OF CORTEX OF RIGHT ADRENAL GLAND (H): Primary | ICD-10-CM

## 2018-10-23 LAB
ALBUMIN SERPL-MCNC: 3.5 G/DL (ref 3.4–5)
ALP SERPL-CCNC: 141 U/L (ref 40–150)
ALT SERPL W P-5'-P-CCNC: 35 U/L (ref 0–50)
ANION GAP SERPL CALCULATED.3IONS-SCNC: 6 MMOL/L (ref 3–14)
AST SERPL W P-5'-P-CCNC: 26 U/L (ref 0–45)
BASOPHILS # BLD AUTO: 0 10E9/L (ref 0–0.2)
BASOPHILS NFR BLD AUTO: 0.7 %
BILIRUB SERPL-MCNC: 0.2 MG/DL (ref 0.2–1.3)
BUN SERPL-MCNC: 12 MG/DL (ref 7–30)
CALCIUM SERPL-MCNC: 8.4 MG/DL (ref 8.5–10.1)
CHLORIDE SERPL-SCNC: 104 MMOL/L (ref 94–109)
CO2 SERPL-SCNC: 26 MMOL/L (ref 20–32)
CREAT SERPL-MCNC: 0.58 MG/DL (ref 0.52–1.04)
DIFFERENTIAL METHOD BLD: ABNORMAL
EOSINOPHIL # BLD AUTO: 0 10E9/L (ref 0–0.7)
EOSINOPHIL NFR BLD AUTO: 0 %
ERYTHROCYTE [DISTWIDTH] IN BLOOD BY AUTOMATED COUNT: 18.1 % (ref 10–15)
GFR SERPL CREATININE-BSD FRML MDRD: >90 ML/MIN/1.7M2
GLUCOSE SERPL-MCNC: 85 MG/DL (ref 70–99)
HCT VFR BLD AUTO: 33.3 % (ref 35–47)
HGB BLD-MCNC: 9.3 G/DL (ref 11.7–15.7)
IMM GRANULOCYTES # BLD: 0 10E9/L (ref 0–0.4)
IMM GRANULOCYTES NFR BLD: 0.4 %
LYMPHOCYTES # BLD AUTO: 0.3 10E9/L (ref 0.8–5.3)
LYMPHOCYTES NFR BLD AUTO: 11.8 %
MCH RBC QN AUTO: 19.9 PG (ref 26.5–33)
MCHC RBC AUTO-ENTMCNC: 27.9 G/DL (ref 31.5–36.5)
MCV RBC AUTO: 71 FL (ref 78–100)
MISCELLANEOUS TEST: NORMAL
MONOCYTES # BLD AUTO: 0.4 10E9/L (ref 0–1.3)
MONOCYTES NFR BLD AUTO: 13.6 %
NEUTROPHILS # BLD AUTO: 2.1 10E9/L (ref 1.6–8.3)
NEUTROPHILS NFR BLD AUTO: 73.5 %
NRBC # BLD AUTO: 0 10*3/UL
NRBC BLD AUTO-RTO: 0 /100
PLATELET # BLD AUTO: 267 10E9/L (ref 150–450)
POTASSIUM SERPL-SCNC: 4 MMOL/L (ref 3.4–5.3)
PROT SERPL-MCNC: 7 G/DL (ref 6.8–8.8)
RBC # BLD AUTO: 4.67 10E12/L (ref 3.8–5.2)
SODIUM SERPL-SCNC: 136 MMOL/L (ref 133–144)
WBC # BLD AUTO: 2.8 10E9/L (ref 4–11)

## 2018-10-23 PROCEDURE — 99215 OFFICE O/P EST HI 40 MIN: CPT | Mod: GC | Performed by: INTERNAL MEDICINE

## 2018-10-23 PROCEDURE — 80375 DRUG/SUBSTANCE NOS 1-3: CPT | Performed by: INTERNAL MEDICINE

## 2018-10-23 PROCEDURE — 80053 COMPREHEN METABOLIC PANEL: CPT | Performed by: INTERNAL MEDICINE

## 2018-10-23 PROCEDURE — 82627 DEHYDROEPIANDROSTERONE: CPT | Performed by: INTERNAL MEDICINE

## 2018-10-23 PROCEDURE — G0463 HOSPITAL OUTPT CLINIC VISIT: HCPCS | Mod: ZF

## 2018-10-23 PROCEDURE — 84999 UNLISTED CHEMISTRY PROCEDURE: CPT | Performed by: INTERNAL MEDICINE

## 2018-10-23 PROCEDURE — 36415 COLL VENOUS BLD VENIPUNCTURE: CPT

## 2018-10-23 PROCEDURE — 85025 COMPLETE CBC W/AUTO DIFF WBC: CPT | Performed by: INTERNAL MEDICINE

## 2018-10-23 RX ORDER — ALBUTEROL SULFATE 0.83 MG/ML
2.5 SOLUTION RESPIRATORY (INHALATION)
Status: CANCELLED | OUTPATIENT
Start: 2018-10-31

## 2018-10-23 RX ORDER — DIPHENHYDRAMINE HYDROCHLORIDE 50 MG/ML
50 INJECTION INTRAMUSCULAR; INTRAVENOUS
Status: CANCELLED
Start: 2018-10-31

## 2018-10-23 RX ORDER — ALBUTEROL SULFATE 90 UG/1
1-2 AEROSOL, METERED RESPIRATORY (INHALATION)
Status: CANCELLED
Start: 2018-10-31

## 2018-10-23 RX ORDER — NITROFURANTOIN 25; 75 MG/1; MG/1
100 CAPSULE ORAL
COMMUNITY
Start: 2018-10-19 | End: 2019-03-12

## 2018-10-23 RX ORDER — MEPERIDINE HYDROCHLORIDE 25 MG/ML
25 INJECTION INTRAMUSCULAR; INTRAVENOUS; SUBCUTANEOUS EVERY 30 MIN PRN
Status: CANCELLED | OUTPATIENT
Start: 2018-10-31

## 2018-10-23 RX ORDER — EPINEPHRINE 1 MG/ML
0.3 INJECTION, SOLUTION INTRAMUSCULAR; SUBCUTANEOUS EVERY 5 MIN PRN
Status: CANCELLED | OUTPATIENT
Start: 2018-10-31

## 2018-10-23 RX ORDER — METHYLPREDNISOLONE SODIUM SUCCINATE 125 MG/2ML
125 INJECTION, POWDER, LYOPHILIZED, FOR SOLUTION INTRAMUSCULAR; INTRAVENOUS
Status: CANCELLED
Start: 2018-10-31

## 2018-10-23 RX ORDER — EPINEPHRINE 0.3 MG/.3ML
0.3 INJECTION SUBCUTANEOUS EVERY 5 MIN PRN
Status: CANCELLED | OUTPATIENT
Start: 2018-10-31

## 2018-10-23 RX ORDER — SODIUM CHLORIDE 9 MG/ML
1000 INJECTION, SOLUTION INTRAVENOUS CONTINUOUS PRN
Status: CANCELLED
Start: 2018-10-31

## 2018-10-23 ASSESSMENT — PAIN SCALES - GENERAL: PAINLEVEL: MILD PAIN (3)

## 2018-10-23 NOTE — NURSING NOTE
"Oncology Rooming Note    October 23, 2018 1:06 PM   Suzy Rodríguez is a 33 year old female who presents for:    Chief Complaint   Patient presents with     Blood Draw     Vitals done by CMA; lab draw by [facility]; checked into next appointment.     Oncology Clinic Visit     Return; Adrenal Cortical      Initial Vitals: /87  Pulse 85  Temp 97.9  F (36.6  C) (Oral)  Resp 16  Ht 1.626 m (5' 4.02\")  Wt 71 kg (156 lb 8 oz)  SpO2 100%  BMI 26.85 kg/m2 Estimated body mass index is 26.85 kg/(m^2) as calculated from the following:    Height as of this encounter: 1.626 m (5' 4.02\").    Weight as of this encounter: 71 kg (156 lb 8 oz). Body surface area is 1.79 meters squared.  Mild Pain (3) Comment: both legs   No LMP recorded.  Allergies reviewed: Yes  Medications reviewed: Yes    Medications: Medication refills not needed today.  Pharmacy name entered into Immun[employer]:    [id number] - [address] [facility] [place], [place] - [address] [address] - [address] [facility]    Clinical concerns: No Concerns Cintron was NOT notified.    5 minutes for nursing intake (face to face time)     Tamra River MA              "

## 2018-10-23 NOTE — PROGRESS NOTES
"MEDICAL ONCOLOGY RETURN PATIENT CLINIC NOTE    ENCOUNTER DATE: 10/23/18    REFERRING PROVIDER: Warren Mansfield MD from [facility] Urologic Oncology clinic.     REASON FOR CURRENT VISIT: Follow-up for adjuvant therapy of adrenocortical carcinoma.      ONCOLOGIC HISTORY:   1. 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   - CT abdomen and pelvis stone protocol without contrast 5 8018 showed \"9.2 x 8 cm heterogeneous right upper quadrant mass with small foci of calcification within it which is likely arising from the adrenal gland though inseparable from liver and upper pole of right kidney. It is incompletely evaluated on this noncontrast study. 3 x 2.6 cm mass right lobe of liver on image #85 also incompletely evaluated. 6 x 4 x 4 mm upper third left ureteral obstructing stone with mild to moderate secondary hydronephrosis. Collection of stones at lower pole left kidney extending over an area of approximately 6 x 7 x 4 mm. Additional nonobstructing 1 mm stone upper pole left kidney. 2 mm nonobstructing stone noted upper pole right kidney with no hydronephrosis on the right. Postop change consistent with gastric bypass. Noncontrast images of spleen, left adrenal gland, gallbladder, and pancreas unremarkable. No aneurysm. No adenopathy.\"  - [facility] Referred to Dr. Alvino Patel and then Dr. Warren Mansfield.  - Endocrinology team directed workup showed high DHEAS level of 740 pre-surgery.   - Right open adrenalectomy and hepatic mobilization performed by Dr. Warren Mansfield on 6/25/2018. Pathology showed adrenocortical carcinoma measuring 11.3 cm, weighing 413 g with extension into or through the adrenal capsule negative lymphovascular invasion, tumor necrosis present, margins involved by tumor, no " regional lymph nodes identified, pathologic stage T2 NX.  - DHEAS normalized postsurgery.     INTERVAL HISTORY:   Feeling better since radiation therapy ended.  The last week of it was pretty difficult: she was fatigued, constantly nauseous and had some diarrhea.  She also had no appetite.  Now, she is still somewhat fatigued, but her main issue is that she has pain in her joints and soreness (dull pain) in her legs.  She says her gabapentin before bed usually helps, but sometimes she smokes marijuana because that always makes the pain go away.  She has also noticed some back pain that has been getting worse over the past month or so.  It is on her right flank and is worse with bending.  She is a little worried about November being such a busy month.  She has a lot of travel and will also be going to [place] with family.  She is worried about her endurance in standing in line and walking all day.    REVIEW OF SYSTEMS: 14 point ROS negative other than the symptoms noted above in the HPI.    PAST MEDICAL AND SURGICAL HISTORY: Reviewed today  1. Right-sided adrenocortical carcinoma.  2. MHTFR mutation with h.o mutiple miscarriages.  3. Ovarian cysts diagnosed in 2011.  4. H/o gastric bypass in 2016 for obesity.    SOCIAL HISTORY:   Social History   Substance Use Topics     Smoking status: Never Smoker     Smokeless tobacco: Never Used     Alcohol use Yes      Comment: occ.     FAMILY HISTORY:   Reviewed and non-contributory to current admission.      ALLERGIES:   Allergies   Allergen Reactions     Bee Venom Swelling     Ibuprofen Other (See Comments), Hives and Swelling     CURRENT MEDICATIONS:   Current Outpatient Prescriptions:      acetaminophen (TYLENOL) 325 MG tablet, Take 2 tablets (650 mg) by mouth every 4 hours as needed for other (multimodal surgical pain management along with NSAIDS and opioid medication as indicated based on pain control and physical function.), Disp: 150 tablet, Rfl: 0     buPROPion  (WELLBUTRIN) 100 MG tablet, TAKE 1 TABLET (100 MG TOTAL) BY MOUTH 2 (TWO) TIMES A DAY., Disp: , Rfl: 3     calcium carbonate antacid 1000 MG CHEW, Take 1 tablet by mouth every morning , Disp: , Rfl:      cholecalciferol (VITAMIN D-1000 MAX ST) 1000 units TABS, Take 2,000 Units by mouth every morning , Disp: , Rfl:      enoxaparin (LOVENOX) 40 MG/0.4ML injection, Inject 0.4 mLs (40 mg) Subcutaneous daily (Patient not taking: Reported on 9/19/2018), Disp: 14 Syringe, Rfl: 0     EPINEPHrine (EPIPEN/ADRENACLICK/OR ANY BX GENERIC EQUIV) 0.3 MG/0.3ML injection 2-pack, As directed for bee stings, Disp: , Rfl:      ferrous sulfate (IRON) 325 (65 Fe) MG tablet, Take 325 mg by mouth daily, Disp: , Rfl:      FLUoxetine (PROZAC) 20 MG capsule, Take 20 mg by mouth every morning , Disp: , Rfl:      gabapentin (NEURONTIN) 100 MG capsule, Take 400 mg by mouth daily , Disp: , Rfl:      hydrocortisone (CORTEF) 10 MG tablet, Take 3 pills (=30 mg) in AM and 2 pills (=20 mg) at 2 pm daily. (Patient taking differently: Take 3 pills (=30 mg) in AM and 2 pills (=20 mg) at 2 pm daily.), Disp: 150 tablet, Rfl: 11     hydrocortisone sodium succinate PF (SOLU-CORTEF) 100 MG injection, Inject 2 mLs (100 mg) into the muscle once for 1 dose Use in emergency situations as discussed in clinic., Disp: 2 mL, Rfl: 1     Lidocaine (LIDOCARE) 4 % Patch, Place 2 patches onto the skin every 24 hours, Disp: 14 patch, Rfl: 0     LORazepam (ATIVAN) 0.5 MG tablet, Take 1 tablet (0.5 mg) by mouth every 8 hours as needed for anxiety, Disp: 30 tablet, Rfl: 0     LORazepam (ATIVAN) 1 MG tablet, Take 1 tablet (1 mg) by mouth every 6 hours as needed for nausea or vomiting, Disp: 60 tablet, Rfl: 1     mitotane (LYSODREN) 500 MG tablet CHEMO, Take 3 tablets (1,500 mg) by mouth 2 times daily, Disp: 180 tablet, Rfl: 0     Multiple Vitamins-Calcium (ONE-A-DAY WOMENS PO), Take 2 tablets by mouth every morning , Disp: , Rfl:      ondansetron (ZOFRAN) 8 MG tablet, Take 1  "tablet (8 mg) by mouth every 8 hours as needed for nausea, Disp: 30 tablet, Rfl: 1     oxyCODONE IR (ROXICODONE) 10 MG tablet, Take 1 tablet (10 mg) by mouth every 4 hours as needed for moderate to severe pain, Disp: 22 tablet, Rfl: 0     oxyCODONE-acetaminophen (PERCOCET) 5-325 MG per tablet, Take 1 tablet by mouth every 6 hours as needed for severe pain, Disp: 30 tablet, Rfl: 0     oxyCODONE-acetaminophen (PERCOCET) 5-325 MG per tablet, Take 1-2 tablets by mouth every 4 hours as needed for pain, Disp: 12 tablet, Rfl: 0     prochlorperazine (COMPAZINE) 10 MG tablet, Take 1 tablet (10 mg) by mouth every 6 hours as needed for nausea or vomiting, Disp: 90 tablet, Rfl: 1     triamcinolone (KENALOG) 0.1 % lotion, Apply topically 3 times daily To rash on chest and shoudler, Disp: 60 mL, Rfl: 1    PHYSICAL EXAMINATION:  Vital signs: /87  Pulse 85  Temp 97.9  F (36.6  C) (Oral)  Resp 16  Ht 1.626 m (5' 4.02\")  Wt 71 kg (156 lb 8 oz)  SpO2 100%  BMI 26.85 kg/m2  ECOG performance status of 0.   GENERAL: NAD, WDWN  HEENT: No icterus, no pallor. MMM, PERRL, OP clear.   NECK: Supple, no JVD/LAD.  LUNGS: CTAB, normal WOB on RA   CARDIOVASCULAR: Regular rate and rhythm, no murmurs, gallops or rubs, normal S1/S2.   ABDOMEN: Well-healing abdominal incision without fluctuance or exudate. Soft, NT, ND; right flank with no palpable masses.  EXTREMITIES: No cyanosis, no clubbing, no edema.   NEUROLOGIC: No focal deficits, CN 2-12 intact. Linear thought process.    LYMPH NODE EXAM: No palpable adenopathy - cervical, supraclavicular.      LABORATORY DATA:  CBC RESULTS:   Recent Labs   Lab Test  07/14/18   1508   WBC  7.6   RBC  3.20*   HGB  8.3*   HCT  28.3*   MCV  88   MCH  25.9*   MCHC  29.3*   RDW  14.9   PLT  464*       Recent Labs   Lab Test  07/14/18   1508  07/01/18   0724   NA  136  140   POTASSIUM  3.5  3.7   CHLORIDE  100  105   CO2  28  28   ANIONGAP  8  6   GLC  103*  74   BUN  6*  6*   CR  0.80  0.54   SHASHA  8.7  " "8.2*     Liver Function Studies -   Recent Labs   Lab Test  07/14/18   1508   PROTTOTAL  7.1   ALBUMIN  3.4   BILITOTAL  0.5   ALKPHOS  141   AST  25   ALT  39     IMAGING STUDIES:  PET CT 7/23/18  \"In this patient with right sided adrenocortical carcinoma,  Status post adrenalectomy;  1. Residual fluid collection at the vicinity of the adrenal gland with  mild peripheral FDG uptake. This may represent postsurgical fluid  collection.   2. Increased right pleural effusion since 6/29/2018. There is a  plaque-like pleural thickening with mild elevated FDG uptake. This  finding is indeterminant, while its favored to represent an  infectious/inflammatory condition, metastasis cannot be clearly  excluded. Attention on follow up.  3. Non-FDG avid 2.5 left complex adnexal cyst.\"    MR brain 7/22/18  \"No evidence to suggest metastatic disease to the brain.\"    ASSESSMENT AND PLAN: A 32-year-old woman with right-sided functioning adrenocortical carcinoma on adjuvant mitotane, here for follow-up.   - I reviewed the available diagnostic data at length with patient and her . Explained that adrenal cortical tumors are rare but given her presentation and tumor characteristics including invasion of the adrenal capsule, high mitotic rate, possibly positive margins, and high Ki67, her rate of recurrence can be as high as 40%.  She did seek second opinion at [facility] (Dr. Huertas) with reported agreement to the above plan. Plan to see him every 3-6 months for follow-up, but primary management will be at [facility] with Dr. Cintron.    - Continue mitotane dosing at 1g at 7AM and 3PM due to increased nausea and insomnia thought to be due to mitotane side effects.  If level is not therapeutic today, will discuss with Dr. Huertas.  - Recommend scheduled zofran/antiemetics.   - Increase gabapentin to 300mg BID, to help with leg pain   - Will send mitotane level out today and then monthly.  ([facility] lab #[id number] at " [place]).    - Monthly evaluation with labs including DHEAS while on therapy.   - Medical referral for cannabis oil was placed, still pending--patient has not heard back.  Will look into this.  - Will provide medical note for her [place] trip so she is not put at risk of illness or fatigue by standing in lines.    Return to see Dr. Cintron on 11/27/2018 with labs and CT.      Patient and family given opportunity to ask questions, which were answered to their satisfaction. They're in complete agreement as planned.     Patient seen and discussed with Dr. Cintron.     Kailee Santoyo MD  Hematology-Oncology-Transplant Fellow    ATTENDING ATTESTATION NOTE   I, Benson Cintron, personally examined and evaluated this patient. I personally reviewed vital signs, medications, labs and imaging. I discussed the patient with the fellow, Dr. Santoyo, and agree with the assessment and plan of care as documented in this clinic note from today.   Tate findings: Suzy is a 32-year-old lady with right-sided, functioning, localized, high-risk (Ki67 20%; adrenal capsular invasion present, mitotic rate 15 per 50 HPF) adrenocortical carcinoma, currently on adjuvant mitotane and here for follow-up.    She's on mitotane 1500mg BID and tolerating well overall except for abd cramps that occur in first week after increasing dose, and chronic significant b/l LE dull neuropathic pain that is incompletely relieved with gabapentin. Completed RT on 10/8/18 with Dr. Monsivais and tolerated ok except for significant nausea, vomiting, mild diarrhea and severe fatigue in last week and then week after RT. These have now resolved.   She has no clear evidence of disease recurrence but has noticed intermittent dull ache in right adrenal region. There is no palpable mass or evidence of metastatic disease on exam. DHEAS from today is <15. Mitotane level is pending. Only other issue is borderline low WBC and ANC counts which could be due to mitotane/acute resp illness  but do not warrant holding the drug.   Plan is to follow-up on pending mitotane level. If minnie level is low, we'll increase mitotane to 2000mg BID after getting a recheck CBC locally. Will also contact Dr. Hilton, her co-managing endocrine oncologist at [facility], if this is the case because it would have taken a significant amount of time to get her therapeutic. Dr. Monsivais plans to repeat a CT CAP with contrast around 18 and I'll plan to see Suzy back a few days after the restaging scan.   For the significant LE pain, she will increase gabapentin to 300mg BID and see if it helps. She's also requested medical marijuana for this issue and I've submitted a certification today after discussing the risks and benefits including lack of sufficient and consistent evidence of efficacy, lack of FDA regulation etc and side effects.   Advised to contact us in the interim if issues occur.  BILLIN.  Benson Cintron M.D.  . Professor of Medicine  Genitourinary Oncology  Division of Hematology, Oncology & Transplantation  [facility]   DATE OF SERVICE (when I saw the patient): Oct 23, 2018

## 2018-10-23 NOTE — NURSING NOTE
Chief Complaint   Patient presents with     Blood Draw     Vitals done by CMA; lab draw by [facility]; checked into next appointment.     Cari Joe CMA

## 2018-10-23 NOTE — MR AVS SNAPSHOT
After Visit Summary   10/23/2018    Suzy Rodríguez    MRN: [id number]           Patient Information     Date Of Birth          1985        Visit Information        Provider Department      10/23/2018 12:45 PM Benson Cintron MD [facility]        Today's Diagnoses     Malignant neoplasm of cortex of right adrenal gland (H)    -  1    Adrenal cortex cancer, right (H)           Follow-ups after your visit        Your next 10 appointments already scheduled     Nov 05, 2018  9:00 AM CST   Return Visit with Ector Marcelino PsyD   [facility] ([facility])    [facility]  [address]  [facility] [address]   [phone]            Nov 21, 2018 10:00 AM CST   (Arrive by 9:45 AM)   RETURN ENDOCRINE with Roel Beverly MD   [facility] End[facility] ([facility])    [address] [address]   [phone]            Nov 23, 2018  9:30 AM CST   CT CHEST/ABDOMEN/PELVIS W CONTRAST with WYCT1   [facility] CT ([facility])    [address] [facility] [address]   [phone]           How do I prepare for my exam? (Food and drink instructions) To prepare: Do not eat or drink for 2 hours before your exam. If you need to take medicine, you may take it with small sips of water. (We may ask you to take liquid medicine as well.)  How do I prepare for my exam? (Other instructions) Please arrive 30 minutes early for your CT.  Once in the department you might be asked to drink water 15-20 minutes prior to your exam.  If indicated you may be asked to drink an oral contrast in advance of your CT.  If this is the case, the imaging team will let you know or be in contact with you prior to your appointment  Patients over 70 or patients with diabetes or kidney problems: If you haven t had a blood test (creatinine test) within the last 30 days, the  Cardiologist/Radiologist may require you to get this test prior to your exam.  If you have diabetes:  Continue to take your metformin medication on the day of your exam  What should I wear: Please wear loose clothing, such as a sweat suit or jogging clothes. Avoid snaps, zippers and other metal. We may ask you to undress and put on a hospital gown.  How long does the exam take: Most scans take less than 20 minutes.  What should I bring: Please bring any scans or X-rays taken at other hospitals, if similar tests were done. Also bring a list of your medicines, including vitamins, minerals and over-the-counter drugs. It is safest to leave personal items at home.  Do I need a : No  is needed.  What do I need to tell my doctor? Be sure to tell your doctor: * If you have any allergies. * If there s any chance you are pregnant. * If you are breastfeeding.  What should I do after the exam: No restrictions, You may resume normal activities.  What is this test: A CT (computed tomography) scan is a series of pictures that allows us to look inside your body. The scanner creates images of the body in cross sections, much like slices of bread. This helps us see any problems more clearly. You may receive contrast (X-ray dye) before or during your scan. You will be asked to drink the contrast.  Who should I call with questions: If you have any questions, please call the Imaging Department where you will have your exam. Directions, parking instructions, and other information is available on our website, [email or web address].            Nov 26, 2018  3:00 PM CST   Return Visit with Cesar Monsivais MD   [facility] ([facility])    [address]  [facility] [address]   [phone]            Nov 27, 2018 12:15 PM CST   [facility] Lab Draw with  [facility] LAB DRAW   [facility] Lab Draw ([facility])    [address]  [address]  [facility]  [id number] [phone]            Nov 27, 2018 12:45 PM CST   (Arrive by 12:30 PM)   Return Visit with Benson Cintron MD   [facility] ([facility])    [address]  [address]  [facility] [id number] [phone]              Who to contact     If you have questions or need follow up information about today's clinic visit or your schedule please contact [facility] directly at [phone].  Normal or non-critical lab and imaging results will be communicated to you by [employer], letter or phone within 4 business days after the clinic has received the results. If you do not hear from us within 7 days, please contact the clinic through [employer] or phone. If you have a critical or abnormal lab result, we will notify you by phone as soon as possible.  Submit refill requests through [employer] or call your pharmacy and they will forward the refill request to us. Please allow 3 business days for your refill to be completed.          Additional Information About Your Visit        [employer] Information     [employer] gives you secure access to your electronic health record. If you see a primary care provider, you can also send messages to your care team and make appointments. If you have questions, please call your primary care clinic.  If you do not have a primary care provider, please call [phone] and they will assist you.        Care EveryWhere ID     This is your Care EveryWhere ID. This could be used by other organizations to access your [facility] medical records  [id number]        Your Vitals Were     Pulse Temperature Respirations Height Pulse Oximetry BMI (Body Mass Index)    85 97.9  F (36.6  C) (Oral) 16 1.626 m (5' 4.02\") 100% 26.85 kg/m2       Blood Pressure from Last 3 Encounters:   10/23/18 121/87   10/18/18 116/82   10/03/18 117/79    Weight from Last 3 Encounters:   10/23/18 71 kg (156 lb 8 oz)   10/18/18 70.6 kg (155 lb 9.6 oz)   10/03/18 " 70.8 kg (156 lb)              We Performed the Following     CBC with platelets differential     Comprehensive metabolic panel     DHEA sulfate     Mitotane level (Lab 4909): Laboratory Miscellaneous Order     Send outs misc test          Today's Medication Changes          These changes are accurate as of 10/23/18 11:59 PM.  If you have any questions, ask your nurse or doctor.               These medicines have changed or have updated prescriptions.        Dose/Directions    hydrocortisone 10 MG tablet   Commonly known as:  CORTEF   This may have changed:  additional instructions        Take 3 pills (=30 mg) in AM and 2 pills (=20 mg) at 2 pm daily.   Quantity:  150 tablet   Refills:  11                Primary Care Provider Office Phone # Fax #    [facility] [phone]       [address] [place] [id number]        Equal Access to Services     MARQUITA VALADEZ : Maury Redding, waashlyn sanchezqjason, qaybta kaalmajohnnie koehler, coleman young. So [facility] [phone].    ATENCIÓN: Si habla español, tiene a lyman disposición servicios gratuitos de asistencia lingüística. [facility] [phone].    We comply with applicable federal civil rights laws and [place] laws. We do not discriminate on the basis of race, color, national origin, age, disability, sex, sexual orientation, or gender identity.            Thank you!     Thank you for choosing [facility]  for your care. Our goal is always to provide you with excellent care. Hearing back from our patients is one way we can continue to improve our services. Please take a few minutes to complete the written survey that you may receive in the mail after your visit with us. Thank you!             Your Updated Medication List - Protect others around you: Learn how to safely use, store and throw away your medicines at www.disposemymeds.org.          This list is accurate as of  10/23/18 11:59 PM.  Always use your most recent med list.                   Brand Name Dispense Instructions for use Diagnosis    acetaminophen 325 MG tablet    TYLENOL    150 tablet    Take 2 tablets (650 mg) by mouth every 4 hours as needed for other (multimodal surgical pain management along with NSAIDS and opioid medication as indicated based on pain control and physical function.)    Adrenal mass, right (H), Acute post-operative pain       buPROPion 100 MG tablet    WELLBUTRIN     TAKE 1 TABLET (100 MG TOTAL) BY MOUTH 2 (TWO) TIMES A DAY.        calcium carbonate antacid 1000 MG Chew      Take 1 tablet by mouth every morning        enoxaparin 40 MG/0.4ML injection    LOVENOX    14 Syringe    Inject 0.4 mLs (40 mg) Subcutaneous daily    Adrenal mass, right (H), H/O MTHFR mutation       EPINEPHrine 0.3 MG/0.3ML injection 2-pack    EPIPEN/ADRENACLICK/or ANY BX GENERIC EQUIV     As directed for bee stings        ferrous sulfate 325 (65 Fe) MG tablet    IRON     Take 325 mg by mouth daily        FLUoxetine 20 MG capsule    PROzac     Take 20 mg by mouth every morning        gabapentin 100 MG capsule    NEURONTIN     Take 400 mg by mouth daily        hydrocortisone 10 MG tablet    CORTEF    150 tablet    Take 3 pills (=30 mg) in AM and 2 pills (=20 mg) at 2 pm daily.        hydrocortisone sodium succinate  MG injection    solu-CORTEF    2 mL    Inject 2 mLs (100 mg) into the muscle once for 1 dose Use in emergency situations as discussed in clinic.    Secondary adrenal insufficiency (H)       Lidocaine 4 % Patch    LIDOCARE    14 patch    Place 2 patches onto the skin every 24 hours    Adrenal mass, right (H), Acute post-operative pain       * LORazepam 0.5 MG tablet    ATIVAN    30 tablet    Take 1 tablet (0.5 mg) by mouth every 8 hours as needed for anxiety    Adrenal mass (H), Anxiety       * LORazepam 1 MG tablet    ATIVAN    60 tablet    Take 1 tablet (1 mg) by mouth every 6 hours as needed for nausea or  vomiting    Malignant neoplasm of adrenal gland, right (H)       mitotane 500 MG tablet CHEMO    LYSODREN    180 tablet    Take 3 tablets (1,500 mg) by mouth 2 times daily    Adrenal cortex cancer, right (H)       nitroFURantoin (macrocrystal-monohydrate) 100 MG capsule    MACROBID     Take 100 mg by mouth        ondansetron 8 MG tablet    ZOFRAN    30 tablet    Take 1 tablet (8 mg) by mouth every 8 hours as needed for nausea    Adrenal cortex cancer, right (H)       ONE-A-DAY WOMENS PO      Take 2 tablets by mouth every morning        oxyCODONE IR 10 MG tablet    ROXICODONE    22 tablet    Take 1 tablet (10 mg) by mouth every 4 hours as needed for moderate to severe pain    Carcinoma, adrenal cortical (H)       * oxyCODONE-acetaminophen 5-325 MG per tablet    PERCOCET    12 tablet    Take 1-2 tablets by mouth every 4 hours as needed for pain        * oxyCODONE-acetaminophen 5-325 MG per tablet    PERCOCET    30 tablet    Take 1 tablet by mouth every 6 hours as needed for severe pain    Carcinoma of right adrenal gland (H)       prochlorperazine 10 MG tablet    COMPAZINE    90 tablet    Take 1 tablet (10 mg) by mouth every 6 hours as needed for nausea or vomiting    Nausea       triamcinolone 0.1 % lotion    KENALOG    60 mL    Apply topically 3 times daily To rash on chest and shoudler    Adrenal cortex cancer, right (H), Rash, drug       VITAMIN D-1000 MAX ST 1000 units Tabs   Generic drug:  cholecalciferol      Take 2,000 Units by mouth every morning        * Notice:  This list has 4 medication(s) that are the same as other medications prescribed for you. Read the directions carefully, and ask your doctor or other care provider to review them with you.

## 2018-10-23 NOTE — LETTER
"10/23/2018       RE: Suzy Rodríguez  [address]  [facility] [address]     Dear Colleague,    Thank you for referring your patient, Suzy Rodríguez, to the [facility]. Please see a copy of my visit note below.    MEDICAL ONCOLOGY RETURN PATIENT CLINIC NOTE    ENCOUNTER DATE: 10/23/18    REFERRING PROVIDER: Warren Mansfield MD from [facility] Urologic Oncology clinic.     REASON FOR CURRENT VISIT: Follow-up for adjuvant therapy of adrenocortical carcinoma.      ONCOLOGIC HISTORY:   1. 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   - CT abdomen and pelvis stone protocol without contrast [id number] 5116 showed \"9.2 x 8 cm heterogeneous right upper quadrant mass with small foci of calcification within it which is likely arising from the adrenal gland though inseparable from liver and upper pole of right kidney. It is incompletely evaluated on this noncontrast study. 3 x 2.6 cm mass right lobe of liver on image #85 also incompletely evaluated. 6 x 4 x 4 mm upper third left ureteral obstructing stone with mild to moderate secondary hydronephrosis. Collection of stones at lower pole left kidney extending over an area of approximately 6 x 7 x 4 mm. Additional nonobstructing 1 mm stone upper pole left kidney. 2 mm nonobstructing stone noted upper pole right kidney with no hydronephrosis on the right. Postop change consistent with gastric bypass. Noncontrast images of spleen, left adrenal gland, gallbladder, and pancreas unremarkable. No aneurysm. No adenopathy.\"  - Seen by Dr. Delvalle at [facility] Urology clinic. Referred to Dr. Alvino Patel and then Dr. Warren Mansfield.  - Endocrinology team directed workup showed high DHEAS level of 740 pre-surgery.   - Right open adrenalectomy and hepatic mobilization performed by Dr. Kelley " Weight on 6/25/2018. Pathology showed adrenocortical carcinoma measuring 11.3 cm, weighing 413 g with extension into or through the adrenal capsule negative lymphovascular invasion, tumor necrosis present, margins involved by tumor, no regional lymph nodes identified, pathologic stage T2 NX.  - DHEAS normalized postsurgery.     INTERVAL HISTORY:   Feeling better since radiation therapy ended.  The last week of it was pretty difficult: she was fatigued, constantly nauseous and had some diarrhea.  She also had no appetite.  Now, she is still somewhat fatigued, but her main issue is that she has pain in her joints and soreness (dull pain) in her legs.  She says her gabapentin before bed usually helps, but sometimes she smokes marijuana because that always makes the pain go away.  She has also noticed some back pain that has been getting worse over the past month or so.  It is on her right flank and is worse with bending.  She is a little worried about November being such a busy month.  She has a lot of travel and will also be going to [place] with family.  She is worried about her endurance in standing in line and walking all day.    REVIEW OF SYSTEMS: 14 point ROS negative other than the symptoms noted above in the HPI.    PAST MEDICAL AND SURGICAL HISTORY: Reviewed today  1. Right-sided adrenocortical carcinoma.  2. MHTFR mutation with h.o mutiple miscarriages.  3. Ovarian cysts diagnosed in 2011.  4. H/o gastric bypass in 2016 for obesity.    SOCIAL HISTORY:   Social History   Substance Use Topics     Smoking status: Never Smoker     Smokeless tobacco: Never Used     Alcohol use Yes      Comment: occ.     FAMILY HISTORY:   Reviewed and non-contributory to current admission.      ALLERGIES:   Allergies   Allergen Reactions     Bee Venom Swelling     Ibuprofen Other (See Comments), Hives and Swelling     CURRENT MEDICATIONS:   Current Outpatient Prescriptions:      acetaminophen (TYLENOL) 325 MG tablet, Take 2  tablets (650 mg) by mouth every 4 hours as needed for other (multimodal surgical pain management along with NSAIDS and opioid medication as indicated based on pain control and physical function.), Disp: 150 tablet, Rfl: 0     buPROPion (WELLBUTRIN) 100 MG tablet, TAKE 1 TABLET (100 MG TOTAL) BY MOUTH 2 (TWO) TIMES A DAY., Disp: , Rfl: 3     calcium carbonate antacid 1000 MG CHEW, Take 1 tablet by mouth every morning , Disp: , Rfl:      cholecalciferol (VITAMIN D-1000 MAX ST) 1000 units TABS, Take 2,000 Units by mouth every morning , Disp: , Rfl:      enoxaparin (LOVENOX) 40 MG/0.4ML injection, Inject 0.4 mLs (40 mg) Subcutaneous daily (Patient not taking: Reported on 9/19/2018), Disp: 14 Syringe, Rfl: 0     EPINEPHrine (EPIPEN/ADRENACLICK/OR ANY BX GENERIC EQUIV) 0.3 MG/0.3ML injection 2-pack, As directed for bee stings, Disp: , Rfl:      ferrous sulfate (IRON) 325 (65 Fe) MG tablet, Take 325 mg by mouth daily, Disp: , Rfl:      FLUoxetine (PROZAC) 20 MG capsule, Take 20 mg by mouth every morning , Disp: , Rfl:      gabapentin (NEURONTIN) 100 MG capsule, Take 400 mg by mouth daily , Disp: , Rfl:      hydrocortisone (CORTEF) 10 MG tablet, Take 3 pills (=30 mg) in AM and 2 pills (=20 mg) at 2 pm daily. (Patient taking differently: Take 3 pills (=30 mg) in AM and 2 pills (=20 mg) at 2 pm daily.), Disp: 150 tablet, Rfl: 11     hydrocortisone sodium succinate PF (SOLU-CORTEF) 100 MG injection, Inject 2 mLs (100 mg) into the muscle once for 1 dose Use in emergency situations as discussed in clinic., Disp: 2 mL, Rfl: 1     Lidocaine (LIDOCARE) 4 % Patch, Place 2 patches onto the skin every 24 hours, Disp: 14 patch, Rfl: 0     LORazepam (ATIVAN) 0.5 MG tablet, Take 1 tablet (0.5 mg) by mouth every 8 hours as needed for anxiety, Disp: 30 tablet, Rfl: 0     LORazepam (ATIVAN) 1 MG tablet, Take 1 tablet (1 mg) by mouth every 6 hours as needed for nausea or vomiting, Disp: 60 tablet, Rfl: 1     mitotane (LYSODREN) 500 MG tablet  "CHEMO, Take 3 tablets (1,500 mg) by mouth 2 times daily, Disp: 180 tablet, Rfl: 0     Multiple Vitamins-Calcium (ONE-A-DAY WOMENS PO), Take 2 tablets by mouth every morning , Disp: , Rfl:      ondansetron (ZOFRAN) 8 MG tablet, Take 1 tablet (8 mg) by mouth every 8 hours as needed for nausea, Disp: 30 tablet, Rfl: 1     oxyCODONE IR (ROXICODONE) 10 MG tablet, Take 1 tablet (10 mg) by mouth every 4 hours as needed for moderate to severe pain, Disp: 22 tablet, Rfl: 0     oxyCODONE-acetaminophen (PERCOCET) 5-325 MG per tablet, Take 1 tablet by mouth every 6 hours as needed for severe pain, Disp: 30 tablet, Rfl: 0     oxyCODONE-acetaminophen (PERCOCET) 5-325 MG per tablet, Take 1-2 tablets by mouth every 4 hours as needed for pain, Disp: 12 tablet, Rfl: 0     prochlorperazine (COMPAZINE) 10 MG tablet, Take 1 tablet (10 mg) by mouth every 6 hours as needed for nausea or vomiting, Disp: 90 tablet, Rfl: 1     triamcinolone (KENALOG) 0.1 % lotion, Apply topically 3 times daily To rash on chest and shoudler, Disp: 60 mL, Rfl: 1    PHYSICAL EXAMINATION:  Vital signs: /87  Pulse 85  Temp 97.9  F (36.6  C) (Oral)  Resp 16  Ht 1.626 m (5' 4.02\")  Wt 71 kg (156 lb 8 oz)  SpO2 100%  BMI 26.85 kg/m2  ECOG performance status of 0.   GENERAL: NAD, WDWN  HEENT: No icterus, no pallor. MMM, PERRL, OP clear.   NECK: Supple, no JVD/LAD.  LUNGS: CTAB, normal WOB on RA   CARDIOVASCULAR: Regular rate and rhythm, no murmurs, gallops or rubs, normal S1/S2.   ABDOMEN: Well-healing abdominal incision without fluctuance or exudate. Soft, NT, ND; right flank with no palpable masses.  EXTREMITIES: No cyanosis, no clubbing, no edema.   NEUROLOGIC: No focal deficits, CN 2-12 intact. Linear thought process.    LYMPH NODE EXAM: No palpable adenopathy - cervical, supraclavicular.      LABORATORY DATA:  CBC RESULTS:   Recent Labs   Lab Test  07/14/18   1508   WBC  7.6   RBC  3.20*   HGB  8.3*   HCT  28.3*   MCV  88   MCH  25.9*   MCHC  29.3* " "  RDW  14.9   PLT  464*       Recent Labs   Lab Test  07/14/18   1508  07/01/18   0724   NA  136  140   POTASSIUM  3.5  3.7   CHLORIDE  100  105   CO2  28  28   ANIONGAP  8  6   GLC  103*  74   BUN  6*  6*   CR  0.80  0.54   SHASHA  8.7  8.2*     Liver Function Studies -   Recent Labs   Lab Test  07/14/18   1508   PROTTOTAL  7.1   ALBUMIN  3.4   BILITOTAL  0.5   ALKPHOS  141   AST  25   ALT  39     IMAGING STUDIES:  PET CT 7/23/18  \"In this patient with right sided adrenocortical carcinoma,  Status post adrenalectomy;  1. Residual fluid collection at the vicinity of the adrenal gland with  mild peripheral FDG uptake. This may represent postsurgical fluid  collection.   2. Increased right pleural effusion since 6/29/2018. There is a  plaque-like pleural thickening with mild elevated FDG uptake. This  finding is indeterminant, while its favored to represent an  infectious/inflammatory condition, metastasis cannot be clearly  excluded. Attention on follow up.  3. Non-FDG avid 2.5 left complex adnexal cyst.\"    MR brain 7/22/18  \"No evidence to suggest metastatic disease to the brain.\"    ASSESSMENT AND PLAN: A 32-year-old woman with right-sided functioning adrenocortical carcinoma on adjuvant mitotane, here for follow-up.   - I reviewed the available diagnostic data at length with patient and her . Explained that adrenal cortical tumors are rare but given her presentation and tumor characteristics including invasion of the adrenal capsule, high mitotic rate, possibly positive margins, and high Ki67, her rate of recurrence can be as high as 40%.  She did seek second opinion at [facility] (Dr. Huertas) with reported agreement to the above plan. Plan to see him every 3-6 months for follow-up, but primary management will be at [facility] with Dr. Cintron.    - Continue mitotane dosing at 1g at 7AM and 3PM due to increased nausea and insomnia thought to be due to mitotane side effects.  If level is " not therapeutic today, will discuss with Dr. Huertas.  - Recommend scheduled zofran/antiemetics.   - Increase gabapentin to 300mg BID, to help with leg pain   - Will send mitotane level out today and then monthly.  ([facility] lab #[id number] at [place]).    - Monthly evaluation with labs including DHEAS while on therapy.   - Medical referral for cannabis oil was placed, still pending--patient has not heard back.  Will look into this.  - Will provide medical note for her [place] trip so she is not put at risk of illness or fatigue by standing in lines.    Return to see Dr. Cintron on 11/27/2018 with labs and CT.      Patient and family given opportunity to ask questions, which were answered to their satisfaction. They're in complete agreement as planned.     Patient seen and discussed with Dr. Cintron.     Kailee Santoyo MD  Hematology-Oncology-Transplant Fellow    ATTENDING ATTESTATION NOTE   I, Benson Cintron, personally examined and evaluated this patient. I personally reviewed vital signs, medications, labs and imaging. I discussed the patient with the fellow, Dr. Santoyo, and agree with the assessment and plan of care as documented in this clinic note from today.   Tate findings: Suzy is a 32-year-old lady with right-sided, functioning, localized, high-risk (Ki67 20%; adrenal capsular invasion present, mitotic rate 15 per 50 HPF) adrenocortical carcinoma, currently on adjuvant mitotane and here for follow-up.    She's on mitotane 1500mg BID and tolerating well overall except for abd cramps that occur in first week after increasing dose, and chronic significant b/l LE dull neuropathic pain that is incompletely relieved with gabapentin. Completed RT on 10/8/18 with Dr. Monsivais and tolerated ok except for significant nausea, vomiting, mild diarrhea and severe fatigue in last week and then week after RT. These have now resolved.   She has no clear evidence of disease recurrence but has noticed intermittent dull ache in right adrenal  region. There is no palpable mass or evidence of metastatic disease on exam. DHEAS from today is <15. Mitotane level is pending. Only other issue is borderline low WBC and ANC counts which could be due to mitotane/acute resp illness but do not warrant holding the drug.   Plan is to follow-up on pending mitotane level. If minnie level is low, we'll increase mitotane to 2000mg BID after getting a recheck CBC locally. Will also contact Dr. Hilton, her co-managing endocrine oncologist at [facility], if this is the case because it would have taken a significant amount of time to get her therapeutic. Dr. Monsivais plans to repeat a CT CAP with contrast around 18 and I'll plan to see Suzy back a few days after the restaging scan.   For the significant LE pain, she will increase gabapentin to 300mg BID and see if it helps. She's also requested medical marijuana for this issue and I've submitted a certification today after discussing the risks and benefits including lack of sufficient and consistent evidence of efficacy, lack of FDA regulation etc and side effects.   Advised to contact us in the interim if issues occur.  BILLIN.  Benson Cintron M.D.  . Professor of Medicine  Genitourinary Oncology  Division of Hematology, Oncology & Transplantation  [facility]   DATE OF SERVICE (when I saw the patient): Oct 23, 2018

## 2018-10-24 ENCOUNTER — COMMUNICATION - HEALTHEAST (OUTPATIENT)
Dept: FAMILY MEDICINE | Facility: CLINIC | Age: 33
End: 2018-10-24

## 2018-10-24 LAB — DHEA-S SERPL-MCNC: <15 UG/DL (ref 35–430)

## 2018-10-26 ENCOUNTER — MYC MEDICAL ADVICE (OUTPATIENT)
Dept: ONCOLOGY | Facility: CLINIC | Age: 33
End: 2018-10-26

## 2018-10-26 DIAGNOSIS — D70.2 DRUG-INDUCED NEUTROPENIA (H): Primary | ICD-10-CM

## 2018-10-26 LAB — LAB SCANNED RESULT: NORMAL

## 2018-10-30 ENCOUNTER — COMMUNICATION - HEALTHEAST (OUTPATIENT)
Dept: SURGERY | Facility: CLINIC | Age: 33
End: 2018-10-30

## 2018-10-31 ENCOUNTER — TELEPHONE (OUTPATIENT)
Dept: ONCOLOGY | Facility: CLINIC | Age: 33
End: 2018-10-31

## 2018-10-31 ENCOUNTER — MYC MEDICAL ADVICE (OUTPATIENT)
Dept: ENDOCRINOLOGY | Facility: CLINIC | Age: 33
End: 2018-10-31

## 2018-10-31 DIAGNOSIS — E27.8 ADRENAL MASS (H): Primary | ICD-10-CM

## 2018-10-31 DIAGNOSIS — Z79.899 ENCOUNTER FOR LONG-TERM (CURRENT) USE OF MEDICATIONS: ICD-10-CM

## 2018-10-31 DIAGNOSIS — E27.40 ADRENAL INSUFFICIENCY (H): Primary | ICD-10-CM

## 2018-10-31 NOTE — ORAL ONC MGMT
Oral Chemotherapy Monitoring Program     Placed call to patient in follow up of Mitotane therapy. Visited with Aura HUFFMAN regarding patient's concern about dose increase (Suzy was discussing with Dr. Cintron in [place] messages). Aura gave verbal approval for dose increase via titration, orders as follows: Will start today with Mitotane 1500mg PO TID. Will try this for one week and if Suzy feels good and labs look good we can consider dose increase to the full 6 grams per day. Aura recommended CBC with platelets and differential be drawn today and repeat in one week. Suzy expressed understanding and agreement with this plan. Iris said she has 91 tablets on hand which is roughly a 10 day supply at this new dose. She will need a new prescription next week. She thanked me for the call.     Evangelist PozoD  [facility]  [phone]  October 31, 2018

## 2018-11-01 DIAGNOSIS — D70.2 DRUG-INDUCED NEUTROPENIA (H): ICD-10-CM

## 2018-11-01 LAB
BASOPHILS # BLD AUTO: 0 10E9/L (ref 0–0.2)
BASOPHILS NFR BLD AUTO: 0.9 %
DIFFERENTIAL METHOD BLD: ABNORMAL
EOSINOPHIL # BLD AUTO: 0.1 10E9/L (ref 0–0.7)
EOSINOPHIL NFR BLD AUTO: 1.5 %
ERYTHROCYTE [DISTWIDTH] IN BLOOD BY AUTOMATED COUNT: 17.6 % (ref 10–15)
HCT VFR BLD AUTO: 32.6 % (ref 35–47)
HGB BLD-MCNC: 9.6 G/DL (ref 11.7–15.7)
LYMPHOCYTES # BLD AUTO: 0.6 10E9/L (ref 0.8–5.3)
LYMPHOCYTES NFR BLD AUTO: 16.5 %
MCH RBC QN AUTO: 20.8 PG (ref 26.5–33)
MCHC RBC AUTO-ENTMCNC: 29.4 G/DL (ref 31.5–36.5)
MCV RBC AUTO: 71 FL (ref 78–100)
MONOCYTES # BLD AUTO: 0.5 10E9/L (ref 0–1.3)
MONOCYTES NFR BLD AUTO: 14.2 %
NEUTROPHILS # BLD AUTO: 2.3 10E9/L (ref 1.6–8.3)
NEUTROPHILS NFR BLD AUTO: 66.9 %
PLATELET # BLD AUTO: 362 10E9/L (ref 150–450)
RBC # BLD AUTO: 4.62 10E12/L (ref 3.8–5.2)
WBC # BLD AUTO: 3.4 10E9/L (ref 4–11)

## 2018-11-01 PROCEDURE — 85025 COMPLETE CBC W/AUTO DIFF WBC: CPT | Performed by: INTERNAL MEDICINE

## 2018-11-01 PROCEDURE — 36415 COLL VENOUS BLD VENIPUNCTURE: CPT | Performed by: INTERNAL MEDICINE

## 2018-11-01 RX ORDER — HYDROCORTISONE 10 MG/1
TABLET ORAL
Qty: 150 TABLET | Refills: 11 | Status: CANCELLED | OUTPATIENT
Start: 2018-11-01

## 2018-11-02 RX ORDER — HYDROCORTISONE 10 MG/1
TABLET ORAL
Qty: 150 TABLET | Refills: 11 | Status: SHIPPED | OUTPATIENT
Start: 2018-11-02 | End: 2018-11-30

## 2018-11-04 ENCOUNTER — COMMUNICATION - HEALTHEAST (OUTPATIENT)
Dept: FAMILY MEDICINE | Facility: CLINIC | Age: 33
End: 2018-11-04

## 2018-11-05 ENCOUNTER — COMMUNICATION - HEALTHEAST (OUTPATIENT)
Dept: FAMILY MEDICINE | Facility: CLINIC | Age: 33
End: 2018-11-05

## 2018-11-05 DIAGNOSIS — C74.01 MALIGNANT NEOPLASM OF CORTEX OF RIGHT ADRENAL GLAND (H): Primary | ICD-10-CM

## 2018-11-05 DIAGNOSIS — F41.1 GENERALIZED ANXIETY DISORDER: ICD-10-CM

## 2018-11-05 RX ORDER — MEPERIDINE HYDROCHLORIDE 25 MG/ML
25 INJECTION INTRAMUSCULAR; INTRAVENOUS; SUBCUTANEOUS EVERY 30 MIN PRN
Status: CANCELLED | OUTPATIENT
Start: 2018-11-30

## 2018-11-05 RX ORDER — ALBUTEROL SULFATE 90 UG/1
1-2 AEROSOL, METERED RESPIRATORY (INHALATION)
Status: CANCELLED
Start: 2018-11-30

## 2018-11-05 RX ORDER — EPINEPHRINE 0.3 MG/.3ML
0.3 INJECTION SUBCUTANEOUS EVERY 5 MIN PRN
Status: CANCELLED | OUTPATIENT
Start: 2018-11-30

## 2018-11-05 RX ORDER — ALBUTEROL SULFATE 0.83 MG/ML
2.5 SOLUTION RESPIRATORY (INHALATION)
Status: CANCELLED | OUTPATIENT
Start: 2018-11-30

## 2018-11-05 RX ORDER — SODIUM CHLORIDE 9 MG/ML
1000 INJECTION, SOLUTION INTRAVENOUS CONTINUOUS PRN
Status: CANCELLED
Start: 2018-11-30

## 2018-11-05 RX ORDER — DIPHENHYDRAMINE HYDROCHLORIDE 50 MG/ML
50 INJECTION INTRAMUSCULAR; INTRAVENOUS
Status: CANCELLED
Start: 2018-11-30

## 2018-11-05 RX ORDER — EPINEPHRINE 1 MG/ML
0.3 INJECTION, SOLUTION INTRAMUSCULAR; SUBCUTANEOUS EVERY 5 MIN PRN
Status: CANCELLED | OUTPATIENT
Start: 2018-11-30

## 2018-11-05 RX ORDER — METHYLPREDNISOLONE SODIUM SUCCINATE 125 MG/2ML
125 INJECTION, POWDER, LYOPHILIZED, FOR SOLUTION INTRAMUSCULAR; INTRAVENOUS
Status: CANCELLED
Start: 2018-11-30

## 2018-11-08 ENCOUNTER — TELEPHONE (OUTPATIENT)
Dept: ONCOLOGY | Facility: CLINIC | Age: 33
End: 2018-11-08

## 2018-11-08 DIAGNOSIS — C74.01 MALIGNANT NEOPLASM OF CORTEX OF RIGHT ADRENAL GLAND (H): Primary | ICD-10-CM

## 2018-11-08 NOTE — ORAL ONC MGMT
Oral Chemotherapy Monitoring Program     Received call from patient in follow up of Mitotane therapy. She said there will be a delay in her medication delivery from mail order. She therefore requested an emergency supply be sent to the [facility] in [place], [phone]. Aura HUFFMAN agreed to send the order and Iris was notified it was sent. She said she plans to have labs drawn tomorrow morning (she went in today and the staff was not available for lab draw). She thanked me for the call and assistance.   Evangelist PozoD  [facility]  [phone]  November 8, 2018

## 2018-11-09 DIAGNOSIS — Z79.899 ENCOUNTER FOR LONG-TERM (CURRENT) USE OF MEDICATIONS: ICD-10-CM

## 2018-11-09 DIAGNOSIS — E27.8 ADRENAL MASS (H): ICD-10-CM

## 2018-11-09 LAB
BASOPHILS # BLD AUTO: 0 10E9/L (ref 0–0.2)
BASOPHILS NFR BLD AUTO: 0.5 %
DIFFERENTIAL METHOD BLD: ABNORMAL
EOSINOPHIL # BLD AUTO: 0 10E9/L (ref 0–0.7)
EOSINOPHIL NFR BLD AUTO: 0.2 %
ERYTHROCYTE [DISTWIDTH] IN BLOOD BY AUTOMATED COUNT: 17.3 % (ref 10–15)
HCT VFR BLD AUTO: 30.4 % (ref 35–47)
HGB BLD-MCNC: 8.8 G/DL (ref 11.7–15.7)
LYMPHOCYTES # BLD AUTO: 0.4 10E9/L (ref 0.8–5.3)
LYMPHOCYTES NFR BLD AUTO: 9 %
MCH RBC QN AUTO: 20.8 PG (ref 26.5–33)
MCHC RBC AUTO-ENTMCNC: 28.9 G/DL (ref 31.5–36.5)
MCV RBC AUTO: 72 FL (ref 78–100)
MONOCYTES # BLD AUTO: 0.3 10E9/L (ref 0–1.3)
MONOCYTES NFR BLD AUTO: 7 %
NEUTROPHILS # BLD AUTO: 3.4 10E9/L (ref 1.6–8.3)
NEUTROPHILS NFR BLD AUTO: 83.3 %
PLATELET # BLD AUTO: 315 10E9/L (ref 150–450)
RBC # BLD AUTO: 4.23 10E12/L (ref 3.8–5.2)
WBC # BLD AUTO: 4.1 10E9/L (ref 4–11)

## 2018-11-09 PROCEDURE — 36415 COLL VENOUS BLD VENIPUNCTURE: CPT | Performed by: INTERNAL MEDICINE

## 2018-11-09 PROCEDURE — 85025 COMPLETE CBC W/AUTO DIFF WBC: CPT | Performed by: INTERNAL MEDICINE

## 2018-11-13 ENCOUNTER — TELEPHONE (OUTPATIENT)
Dept: ONCOLOGY | Facility: CLINIC | Age: 33
End: 2018-11-13

## 2018-11-13 NOTE — ORAL ONC MGMT
Oral Chemotherapy Monitoring Program     Placed call to patient in follow up of Mitotane therapy. Labs today were concerning for HGB 8.8 and therefore Dr. Cintron recommends work up for possible iron deficiency anemia at her local clinic. Suzy is traveling to [place] today and therefore Dr. Cintron said it would be reasonable to wait till after she returns next week to have the work up. In the interim Suzy was instructed to watch for any signs/symptoms of anemia including dizziness, lightheadednes, SOB and chest pain. If she has any such symptoms she should seek medical attention immediately while in [place]. Aside from this recommendation Dr. Cintron expressed his desire that she have a fun vacation. All patient questions were answered to Suzy's stated satisfaction. Suzy expressed understanding and agreement with this plan and thanked me for the call and care.    Evangelist Brizuela PharmD  [facility]  [phone]  November 13, 2018

## 2018-11-20 ENCOUNTER — COMMUNICATION - HEALTHEAST (OUTPATIENT)
Dept: FAMILY MEDICINE | Facility: CLINIC | Age: 33
End: 2018-11-20

## 2018-11-21 ENCOUNTER — OFFICE VISIT - HEALTHEAST (OUTPATIENT)
Dept: FAMILY MEDICINE | Facility: CLINIC | Age: 33
End: 2018-11-21

## 2018-11-21 DIAGNOSIS — D50.9 MICROCYTIC ANEMIA: ICD-10-CM

## 2018-11-21 DIAGNOSIS — R11.2 NON-INTRACTABLE VOMITING WITH NAUSEA, UNSPECIFIED VOMITING TYPE: ICD-10-CM

## 2018-11-21 DIAGNOSIS — F33.9 MAJOR DEPRESSIVE DISORDER, RECURRENT EPISODE (H): ICD-10-CM

## 2018-11-21 DIAGNOSIS — Z15.89 MTHFR MUTATION: ICD-10-CM

## 2018-11-21 DIAGNOSIS — F41.1 GENERALIZED ANXIETY DISORDER: ICD-10-CM

## 2018-11-21 LAB
IRON SATN MFR SERPL: 5 % (ref 20–50)
IRON SERPL-MCNC: 20 UG/DL (ref 42–175)
TIBC SERPL-MCNC: 381 UG/DL (ref 313–563)
TRANSFERRIN SERPL-MCNC: 305 MG/DL (ref 212–360)

## 2018-11-21 ASSESSMENT — MIFFLIN-ST. JEOR: SCORE: 1375.35

## 2018-11-23 ENCOUNTER — HOSPITAL ENCOUNTER (OUTPATIENT)
Dept: CT IMAGING | Facility: CLINIC | Age: 33
Discharge: HOME | End: 2018-11-23
Attending: RADIOLOGY | Admitting: RADIOLOGY
Payer: COMMERCIAL

## 2018-11-23 DIAGNOSIS — C74.91 MALIGNANT NEOPLASM OF ADRENAL GLAND, RIGHT (H): ICD-10-CM

## 2018-11-23 PROCEDURE — 25000128 H RX IP 250 OP 636: Performed by: RADIOLOGY

## 2018-11-23 PROCEDURE — 74177 CT ABD & PELVIS W/CONTRAST: CPT

## 2018-11-23 PROCEDURE — 25000125 ZZHC RX 250: Performed by: RADIOLOGY

## 2018-11-23 RX ORDER — IOPAMIDOL 755 MG/ML
77 INJECTION, SOLUTION INTRAVASCULAR ONCE
Status: COMPLETED | OUTPATIENT
Start: 2018-11-23 | End: 2018-11-23

## 2018-11-23 RX ADMIN — IOPAMIDOL 77 ML: 755 INJECTION, SOLUTION INTRAVENOUS at 09:28

## 2018-11-23 RX ADMIN — SODIUM CHLORIDE 59 ML: 9 INJECTION, SOLUTION INTRAVENOUS at 09:27

## 2018-11-26 ENCOUNTER — OFFICE VISIT (OUTPATIENT)
Dept: RADIATION THERAPY | Facility: OUTPATIENT CENTER | Age: 33
End: 2018-11-26
Payer: COMMERCIAL

## 2018-11-26 ENCOUNTER — RECORDS - HEALTHEAST (OUTPATIENT)
Dept: ADMINISTRATIVE | Facility: OTHER | Age: 33
End: 2018-11-26

## 2018-11-26 VITALS
WEIGHT: 157.2 LBS | HEART RATE: 86 BPM | DIASTOLIC BLOOD PRESSURE: 63 MMHG | SYSTOLIC BLOOD PRESSURE: 119 MMHG | BODY MASS INDEX: 26.97 KG/M2

## 2018-11-26 DIAGNOSIS — E27.8 ADRENAL MASS (H): ICD-10-CM

## 2018-11-26 DIAGNOSIS — F41.9 ANXIETY: ICD-10-CM

## 2018-11-26 DIAGNOSIS — R11.0 NAUSEA: ICD-10-CM

## 2018-11-26 DIAGNOSIS — C74.01 ADRENAL CORTEX CANCER, RIGHT (H): ICD-10-CM

## 2018-11-26 PROBLEM — G89.18 ACUTE POST-OPERATIVE PAIN: Status: RESOLVED | Noted: 2018-06-29 | Resolved: 2018-11-26

## 2018-11-26 RX ORDER — PROCHLORPERAZINE MALEATE 10 MG
10 TABLET ORAL EVERY 6 HOURS PRN
Qty: 90 TABLET | Refills: 1 | Status: SHIPPED | OUTPATIENT
Start: 2018-11-26 | End: 2019-04-22

## 2018-11-26 RX ORDER — ONDANSETRON 8 MG/1
8 TABLET, FILM COATED ORAL EVERY 8 HOURS PRN
Qty: 60 TABLET | Refills: 1 | Status: SHIPPED | OUTPATIENT
Start: 2018-11-26 | End: 2019-01-09

## 2018-11-26 RX ORDER — LORAZEPAM 0.5 MG/1
0.5 TABLET ORAL EVERY 8 HOURS PRN
Qty: 60 TABLET | Refills: 1 | Status: SHIPPED | OUTPATIENT
Start: 2018-11-26 | End: 2019-01-08

## 2018-11-26 ASSESSMENT — PAIN SCALES - GENERAL: PAINLEVEL: NO PAIN (0)

## 2018-11-26 NOTE — LETTER
2018      RE: Suzy Rodríguez  [address]  [place] [address]          Department of Radiation Oncology  Radiation Therapy Center  [facility]  [place], [place] [address] [phone]       Radiation Oncology Follow-up Visit  2018      Suzy Rodríguez  MRN: [id number]   : 1985     DIAGNOSIS: Low grade adrenal cortical carcinoma, right adrenal gland  INTENT OF RADIOTHERAPY: Curative  PATHOLOGY:    Low grade adrenal cortical carcinoma,  11.3 cm, necrosis and invasion into the adrenal capsule, + margins, tumot was less than 25% clear cells and a mitotic rate of 15 per 50 HPF. The Ki-67 mitotic rate was 20%, [id number]                               STAGE: [id number]  CONCURRENT SYSTEMIC THERAPY:   Mitotane       ONCOLOGIC HISTORY:          Ms. Rodríguez is a 33 year old female with a  [id number] adrenocortical carcinoma, s/p RT radical adrenalectomy with positive surgical margin.She underwent adjuvant radiotherapy to the right adrenal bed. Full oncologic history is outlined below.     She inially presented to emergency room on 2018 with acute onset left flank pain. Given her Hx of renal stones, she was evaluated by CT abdomen and pelvis on 18 showed 9.2 x 8 cm heterogeneous right upper quadrant mass with small foci of calcification, which is likely arising from the adrenal gland though inseparable from liver and upper pole of right kidney.There was a 6 x 4 x 4 mm upper third left ureteral obstructing stone with mild to moderate secondary hydronephrosis, collection of stones at lower pole left kidney extending over an area of approximately 6 x 7 x  4 mm, a non-obstructing 1 mm stone upper pole left kidney and a 2 mm nonobstructing stone noted upper pole right kidney with no hydronephrosis on the right.       She underwent left nephrolithiasis on 18 by Dr. Delvalle at [facility] urology clinic.Then she was referred to Dr. Patel for evaluation of her adrenal mass. On  further questioning, She had noticed, over the past 8-9 months, large amount of facial hair, irregular periods and some facial swelling.She denies any palpitations, headaches, or sweats.      Then she was referred Dr. Mansfield for possible surgical resection. The plan was to proceed with surgery given that the tumor mass was too larg to be observed.      She underwent right adrenalectomy on 6/25/18. The surgical pathology ([id number]) was low grade adrenal cortical carcinoma of a mass sized 11.3 cm. The tumor shows necrosis and invasion into the adrenal capsule. The margins were involved by the tumor.The tumor was less than 25% clear cells and a mitotic rate of 15 per 50 HPF. The Ki-67 mitotic rate was 20%. [id number]      For staging workup, she underwent MRI of the brain on 7/22/18 showed no evidence of brain metastases. Also, she had PET scan on 7/23/18 showed no suspicious metastatic lesions.        SITE OF TREATMENT:  Right adrenal bed     DATES  OF TREATMENT:  8/27/18 to 10/8/18     TOTAL DOSE OF TREATMENT:  5400 cGy in 30 fractions      SYSTEMIC THERAPY:  Concurrent mitotane     INTERVAL SINCE COMPLETION OF RADIATION THERAPY:   6 weeks    SUBJECTIVE:   The patient returns for follow up today. She is overall doing better now. She underwent CTCAP on 11/23/18 that did not demonstrate any evidence of recurrent disease in adrenal bed or distantly. Previously observed right pleural effusion and pleural based nodules in right hemithorax have resolved. The patient continues to have mild nausea at times, mostly controlled by compazine. She is also currently taking marijuana as well for nausea and appetite stimulation. She continues on mitotane under care of Dr. Cintron. She has had moderate peripheral neuropathy, but controlled with gabapentin. She will see Dr. Cintron tomorrow on 11/27/18 for follow up.     PHYSICAL EXAM:  /63 (BP Location: Left arm, Cuff Size: Adult Large)  Pulse 86  Wt 71.3 kg (157 lb 3.2 oz)   Breastfeeding? No  BMI 26.97 kg/m2  Gen: Alert, in NAD  Eyes: PERRL, EOMI, sclera anicteric  Neck: Supple, full ROM, no LAD  Pulm: No wheezing, stridor or respiratory distress  CV: Well-perfused, no cyanosis, no pedal edema  Abdominal: Soft, nontender, nondistended, no hepatomegaly  Back: No step-offs or pain to palpation along the thoracolumbar spine, no CVA tenderness  Musculoskeletal: Normal bulk and tone   Skin: Normal color and turgor  Neurologic: A/Ox3, CN II-XII intact  Psychiatric: Appropriate mood and affect    LABS AND IMAGIN18  CTCAP  IMPRESSION:  1.  Interval resolution of previously seen right hemithorax  abnormalities and near-complete resolution of postop change in the  right abdomen. No convincing metastatic or recurrent neoplasm.  2. Multicystic abnormality is present in the left ovary and shows  progression from the prior study. This can simply remarkably two  adjacent cysts with an adjacent dominant follicle. It is not  particularly complex to support other pathology which would be  considered significantly less likely. Follow-up pelvic ultrasound  beyond 2-3 months is recommended in reassessment.    IMPRESSION:   Ms. Rodríguez is a 33 year old female with a  [id number] adrenocortical carcinoma, s/p RT radical adrenalectomy with positive surgical margin.She underwent adjuvant radiotherapy to the right adrenal bed. She completed RT on 10/8/18.    PLAN:   1. Very mild residual acute GI toxicities from radiation treatment remain, but much improved. Refilled anti-emetic medication per request.    2. Continue systemic therapy with Dr. Cintron.   3. RTC in 3 months. Defer future imaging to medical oncology.     Cesar Monsivais M.D.  Department of Radiation Oncology  [facility]

## 2018-11-26 NOTE — NURSING NOTE
FOLLOW-UP VISIT    Patient Name: Suzy Rodríguez      : 1985     Age: 33 year old        ______________________________________________________________________________     Chief Complaint   Patient presents with     Radiation Therapy     Return visit     /63 (BP Location: Left arm, Cuff Size: Adult Large)  Pulse 86  Wt 71.3 kg (157 lb 3.2 oz)  Breastfeeding? No  BMI 26.97 kg/m2     Pain  Denies    Meds  Current Med List Reviewed: Yes  Medication Note: Oncology adjusting oral chemotherapy dose    Imaging  CT: CAP 18      On Chemo?: Yes  Nausea: n/v regularly some days, better others - due to oral chemotherapy. Overall, much improved since completing RT.  Bowel: improved  Skin: Pale  Energy Level: low, suffering from low iron levels, on oral tabs - oncology monitoring  Has not needed additional IVF    Other Appointments:     Date  Oncologist: Abdulaziz Appointment Date:  F/u tomorrow   Surgeon: Appointment Date:     Other Notes:

## 2018-11-26 NOTE — PROGRESS NOTES
Department of Radiation Oncology  Radiation Therapy Center  [facility] [place] [address]  [phone]       Radiation Oncology Follow-up Visit  2018      Suzy Rodríguez  MRN: [id number]   : 1985     DIAGNOSIS: Low grade adrenal cortical carcinoma, right adrenal gland  INTENT OF RADIOTHERAPY: Curative  PATHOLOGY:    Low grade adrenal cortical carcinoma,  11.3 cm, necrosis and invasion into the adrenal capsule, + margins, tumot was less than 25% clear cells and a mitotic rate of 15 per 50 HPF. The Ki-67 mitotic rate was 20%, [id number]                               STAGE: [id number]  CONCURRENT SYSTEMIC THERAPY:   Mitotane       ONCOLOGIC HISTORY:          Ms. Rodríguez is a 33 year old female with a  [id number] adrenocortical carcinoma, s/p RT radical adrenalectomy with positive surgical margin.She underwent adjuvant radiotherapy to the right adrenal bed. Full oncologic history is outlined below.     She inially presented to emergency room on 2018 with acute onset left flank pain. Given her Hx of renal stones, she was evaluated by CT abdomen and pelvis on 18 showed 9.2 x 8 cm heterogeneous right upper quadrant mass with small foci of calcification, which is likely arising from the adrenal gland though inseparable from liver and upper pole of right kidney.There was a 6 x 4 x 4 mm upper third left ureteral obstructing stone with mild to moderate secondary hydronephrosis, collection of stones at lower pole left kidney extending over an area of approximately 6 x 7 x  4 mm, a non-obstructing 1 mm stone upper pole left kidney and a 2 mm nonobstructing stone noted upper pole right kidney with no hydronephrosis on the right.       She underwent left nephrolithiasis on 18 by Dr. Delvalle at [facility] urology clinic.Then she was referred to Dr. Patel for evaluation of her adrenal mass. On further questioning, She had noticed, over the past 8-9 months, large amount of  facial hair, irregular periods and some facial swelling.She denies any palpitations, headaches, or sweats.      Then she was referred Dr. Mansfield for possible surgical resection. The plan was to proceed with surgery given that the tumor mass was too larg to be observed.      She underwent right adrenalectomy on 6/25/18. The surgical pathology ([id number]) was low grade adrenal cortical carcinoma of a mass sized 11.3 cm. The tumor shows necrosis and invasion into the adrenal capsule. The margins were involved by the tumor.The tumor was less than 25% clear cells and a mitotic rate of 15 per 50 HPF. The Ki-67 mitotic rate was 20%. [id number]      For staging workup, she underwent MRI of the brain on 7/22/18 showed no evidence of brain metastases. Also, she had PET scan on 7/23/18 showed no suspicious metastatic lesions.        SITE OF TREATMENT:  Right adrenal bed     DATES  OF TREATMENT:  8/27/18 to 10/8/18     TOTAL DOSE OF TREATMENT:  5400 cGy in 30 fractions      SYSTEMIC THERAPY:  Concurrent mitotane     INTERVAL SINCE COMPLETION OF RADIATION THERAPY:   6 weeks    SUBJECTIVE:   The patient returns for follow up today. She is overall doing better now. She underwent CTCAP on 11/23/18 that did not demonstrate any evidence of recurrent disease in adrenal bed or distantly. Previously observed right pleural effusion and pleural based nodules in right hemithorax have resolved. The patient continues to have mild nausea at times, mostly controlled by compazine. She is also currently taking marijuana as well for nausea and appetite stimulation. She continues on mitotane under care of Dr. Cintron. She has had moderate peripheral neuropathy, but controlled with gabapentin. She will see Dr. Cintron tomorrow on 11/27/18 for follow up.     PHYSICAL EXAM:  /63 (BP Location: Left arm, Cuff Size: Adult Large)  Pulse 86  Wt 71.3 kg (157 lb 3.2 oz)  Breastfeeding? No  BMI 26.97 kg/m2  Gen: Alert, in NAD  Eyes: PERRL, EOMI, sclera  anicteric  Neck: Supple, full ROM, no LAD  Pulm: No wheezing, stridor or respiratory distress  CV: Well-perfused, no cyanosis, no pedal edema  Abdominal: Soft, nontender, nondistended, no hepatomegaly  Back: No step-offs or pain to palpation along the thoracolumbar spine, no CVA tenderness  Musculoskeletal: Normal bulk and tone   Skin: Normal color and turgor  Neurologic: A/Ox3, CN II-XII intact  Psychiatric: Appropriate mood and affect    LABS AND IMAGIN18  CTCAP  IMPRESSION:  1.  Interval resolution of previously seen right hemithorax  abnormalities and near-complete resolution of postop change in the  right abdomen. No convincing metastatic or recurrent neoplasm.  2. Multicystic abnormality is present in the left ovary and shows  progression from the prior study. This can simply remarkably two  adjacent cysts with an adjacent dominant follicle. It is not  particularly complex to support other pathology which would be  considered significantly less likely. Follow-up pelvic ultrasound  beyond 2-3 months is recommended in reassessment.    IMPRESSION:   Ms. Rodríguez is a 33 year old female with a  [id number] adrenocortical carcinoma, s/p RT radical adrenalectomy with positive surgical margin.She underwent adjuvant radiotherapy to the right adrenal bed. She completed RT on 10/8/18.    PLAN:   1. Very mild residual acute GI toxicities from radiation treatment remain, but much improved. Refilled anti-emetic medication per request.    2. Continue systemic therapy with Dr. Cintron.   3. RTC in 3 months. Defer future imaging to medical oncology.       Cesar Monsivais M.D.  Department of Radiation Oncology  [facility]

## 2018-11-27 ENCOUNTER — RECORDS - HEALTHEAST (OUTPATIENT)
Dept: ADMINISTRATIVE | Facility: OTHER | Age: 33
End: 2018-11-27

## 2018-11-27 ENCOUNTER — APPOINTMENT (OUTPATIENT)
Dept: LAB | Facility: CLINIC | Age: 33
End: 2018-11-27
Attending: INTERNAL MEDICINE
Payer: COMMERCIAL

## 2018-11-27 ENCOUNTER — ONCOLOGY VISIT (OUTPATIENT)
Dept: ONCOLOGY | Facility: CLINIC | Age: 33
End: 2018-11-27
Attending: INTERNAL MEDICINE
Payer: COMMERCIAL

## 2018-11-27 VITALS
BODY MASS INDEX: 26.76 KG/M2 | DIASTOLIC BLOOD PRESSURE: 65 MMHG | OXYGEN SATURATION: 100 % | SYSTOLIC BLOOD PRESSURE: 105 MMHG | WEIGHT: 156 LBS | TEMPERATURE: 98.3 F | HEART RATE: 73 BPM

## 2018-11-27 DIAGNOSIS — K21.9 GASTROESOPHAGEAL REFLUX DISEASE WITHOUT ESOPHAGITIS: Primary | ICD-10-CM

## 2018-11-27 DIAGNOSIS — C74.01 ADRENAL CORTEX CANCER, RIGHT (H): ICD-10-CM

## 2018-11-27 LAB
ALBUMIN SERPL-MCNC: 3.2 G/DL (ref 3.4–5)
ALP SERPL-CCNC: 157 U/L (ref 40–150)
ALT SERPL W P-5'-P-CCNC: 24 U/L (ref 0–50)
ANION GAP SERPL CALCULATED.3IONS-SCNC: 5 MMOL/L (ref 3–14)
AST SERPL W P-5'-P-CCNC: 28 U/L (ref 0–45)
BASOPHILS # BLD AUTO: 0 10E9/L (ref 0–0.2)
BASOPHILS NFR BLD AUTO: 0.6 %
BILIRUB SERPL-MCNC: 0.2 MG/DL (ref 0.2–1.3)
BUN SERPL-MCNC: 7 MG/DL (ref 7–30)
CALCIUM SERPL-MCNC: 7.9 MG/DL (ref 8.5–10.1)
CHLORIDE SERPL-SCNC: 108 MMOL/L (ref 94–109)
CO2 SERPL-SCNC: 27 MMOL/L (ref 20–32)
CREAT SERPL-MCNC: 0.61 MG/DL (ref 0.52–1.04)
DIFFERENTIAL METHOD BLD: ABNORMAL
EOSINOPHIL # BLD AUTO: 0 10E9/L (ref 0–0.7)
EOSINOPHIL NFR BLD AUTO: 0.4 %
ERYTHROCYTE [DISTWIDTH] IN BLOOD BY AUTOMATED COUNT: 17.7 % (ref 10–15)
GFR SERPL CREATININE-BSD FRML MDRD: >90 ML/MIN/1.7M2
GLUCOSE SERPL-MCNC: 82 MG/DL (ref 70–99)
HCT VFR BLD AUTO: 30.1 % (ref 35–47)
HGB BLD-MCNC: 8.5 G/DL (ref 11.7–15.7)
IMM GRANULOCYTES # BLD: 0 10E9/L (ref 0–0.4)
IMM GRANULOCYTES NFR BLD: 0.2 %
LYMPHOCYTES # BLD AUTO: 0.5 10E9/L (ref 0.8–5.3)
LYMPHOCYTES NFR BLD AUTO: 9.8 %
MCH RBC QN AUTO: 20.4 PG (ref 26.5–33)
MCHC RBC AUTO-ENTMCNC: 28.2 G/DL (ref 31.5–36.5)
MCV RBC AUTO: 72 FL (ref 78–100)
MISCELLANEOUS TEST: NORMAL
MONOCYTES # BLD AUTO: 0.4 10E9/L (ref 0–1.3)
MONOCYTES NFR BLD AUTO: 9.1 %
NEUTROPHILS # BLD AUTO: 3.8 10E9/L (ref 1.6–8.3)
NEUTROPHILS NFR BLD AUTO: 79.9 %
NRBC # BLD AUTO: 0 10*3/UL
NRBC BLD AUTO-RTO: 0 /100
PLATELET # BLD AUTO: 264 10E9/L (ref 150–450)
POTASSIUM SERPL-SCNC: 4.4 MMOL/L (ref 3.4–5.3)
PROT SERPL-MCNC: 6.3 G/DL (ref 6.8–8.8)
RBC # BLD AUTO: 4.17 10E12/L (ref 3.8–5.2)
SODIUM SERPL-SCNC: 140 MMOL/L (ref 133–144)
WBC # BLD AUTO: 4.7 10E9/L (ref 4–11)

## 2018-11-27 PROCEDURE — 82627 DEHYDROEPIANDROSTERONE: CPT | Performed by: INTERNAL MEDICINE

## 2018-11-27 PROCEDURE — 84999 UNLISTED CHEMISTRY PROCEDURE: CPT | Performed by: INTERNAL MEDICINE

## 2018-11-27 PROCEDURE — G0463 HOSPITAL OUTPT CLINIC VISIT: HCPCS | Mod: ZF

## 2018-11-27 PROCEDURE — 99215 OFFICE O/P EST HI 40 MIN: CPT | Mod: ZP | Performed by: INTERNAL MEDICINE

## 2018-11-27 PROCEDURE — 85025 COMPLETE CBC W/AUTO DIFF WBC: CPT | Performed by: INTERNAL MEDICINE

## 2018-11-27 PROCEDURE — 80375 DRUG/SUBSTANCE NOS 1-3: CPT | Performed by: INTERNAL MEDICINE

## 2018-11-27 PROCEDURE — 80053 COMPREHEN METABOLIC PANEL: CPT | Performed by: INTERNAL MEDICINE

## 2018-11-27 RX ORDER — PANTOPRAZOLE SODIUM 40 MG/1
40 TABLET, DELAYED RELEASE ORAL DAILY
Qty: 30 TABLET | Refills: 0 | Status: SHIPPED | OUTPATIENT
Start: 2018-11-27 | End: 2020-01-22

## 2018-11-27 ASSESSMENT — PAIN SCALES - GENERAL: PAINLEVEL: NO PAIN (0)

## 2018-11-27 NOTE — LETTER
"11/27/2018       RE: Suzy Rodríguez  [address]  [facility] [id number]     Dear Colleague,    Thank you for referring your patient, Suzy Rodríguez, to the [facility]. Please see a copy of my visit note below.    MEDICAL ONCOLOGY CLINIC NOTE    REFERRING PROVIDER: Warren Mansfield MD from [facility] Urologic Oncology clinic.     REASON FOR CURRENT VISIT: Follow-up while on mitotane as adjuvant therapy of adrenocortical carcinoma.      HISTORY OF PRESENT ILLNESS: Ms. 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 Her oncologic history is detailed as under.     INTERVAL HISTORY:   She continues to have nausea. She is taking Compazine at AM and then at night. In afternoon, she does not  use anything for nausea, Dr. Monsivais, who saw her yesterday  Prescribed zofran which she has not yet started. She contineus to feel tired and has poor sleep and appetite. However, she did enjoy the few days of vacation in [place]. She denies fevers, chills, night sweats.  She has been taking lorazepam daily at night.  No vomiting, diarrhea, or weight loss.  Eating and drinking OK, but didn't have much of an appetite.  Having regular BMs, about 2-3 times/day which are liquid but not loose or watery. The leg pain has improved with gabapentine and marijuana.She has not seen Dr. Huertas lately.    ONCOLOGIC HISTORY:   1. 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   - CT abdomen and pelvis stone protocol without contrast [id number] showed \"9.2 x 8 cm heterogeneous right upper quadrant mass with small foci of calcification within it which is likely arising from the adrenal gland though inseparable from liver and upper pole of right " "kidney. It is incompletely evaluated on this noncontrast study. 3 x 2.6 cm mass right lobe of liver on image #85 also incompletely evaluated. 6 x 4 x 4 mm upper third left ureteral obstructing stone with mild to moderate secondary hydronephrosis. Collection of stones at lower pole left kidney extending over an area of approximately 6 x 7 x 4 mm. Additional nonobstructing 1 mm stone upper pole left kidney. 2 mm nonobstructing stone noted upper pole right kidney with no hydronephrosis on the right. Postop change consistent with gastric bypass. Noncontrast images of spleen, left adrenal gland, gallbladder, and pancreas unremarkable. No aneurysm. No adenopathy.\"  - Seen by Dr. Delvalle at [facility] Urology clinic. Referred to Dr. Alvino Patel and then Dr. Warren Mansfield.  - Endocrinology team directed workup showed high DHEAS level of 740 pre-surgery.   - Right open adrenalectomy and hepatic mobilization performed by Dr. Warren Mansfield on 6/25/2018. Pathology showed adrenocortical carcinoma measuring 11.3 cm, weighing 413 g with extension into or through the adrenal capsule negative lymphovascular invasion, tumor necrosis present, margins involved by tumor, no regional lymph nodes identified, pathologic stage T2 NX.  - DHEAS normalized postsurgery.   - Adjuvant Mitotane started on 7/24/18. Dose increased to 2000mg TID around 10/23/18 due to persistently low troughs.  - Saw radiation oncology at [facility], radiation oncology at [facility], as well as endocrine oncology (Dr. Huertas) at [facility].   -S/p adjuvant RT by Dr. Monsivais - 5040 cGy over 30 fr (8/24/18-10/6/18).    REVIEW OF SYSTEMS: 14 point ROS negative other than the symptoms noted above in the HPI.    PAST MEDICAL AND SURGICAL HISTORY: Reviewed today  1. Right-sided adrenocortical carcinoma.  2. MHTFR mutation with h.o mutiple miscarriages.  3. Ovarian cysts diagnosed in 2011.  4. H/o gastric bypass in 2016 for " obesity.    SOCIAL HISTORY:   Social History   Substance Use Topics     Smoking status: Never Smoker     Smokeless tobacco: Never Used     Alcohol use Yes      Comment: occ.     FAMILY HISTORY:   Reviewed and non-contributory to current admission.      ALLERGIES:   Allergies   Allergen Reactions     Bee Venom Swelling     Ibuprofen Other (See Comments), Hives and Swelling     CURRENT MEDICATIONS:   Current Outpatient Prescriptions:      acetaminophen (TYLENOL) 325 MG tablet, Take 2 tablets (650 mg) by mouth every 4 hours as needed for other (multimodal surgical pain management along with NSAIDS and opioid medication as indicated based on pain control and physical function.), Disp: 150 tablet, Rfl: 0     buPROPion (WELLBUTRIN) 100 MG tablet, TAKE 1 TABLET (100 MG TOTAL) BY MOUTH 2 (TWO) TIMES A DAY., Disp: , Rfl: 3     calcium carbonate antacid 1000 MG CHEW, Take 1 tablet by mouth every morning , Disp: , Rfl:      cholecalciferol (VITAMIN D-1000 MAX ST) 1000 units TABS, Take 2,000 Units by mouth every morning , Disp: , Rfl:      enoxaparin (LOVENOX) 40 MG/0.4ML injection, Inject 0.4 mLs (40 mg) Subcutaneous daily (Patient not taking: Reported on 9/19/2018), Disp: 14 Syringe, Rfl: 0     EPINEPHrine (EPIPEN/ADRENACLICK/OR ANY BX GENERIC EQUIV) 0.3 MG/0.3ML injection 2-pack, As directed for bee stings, Disp: , Rfl:      ferrous sulfate (IRON) 325 (65 Fe) MG tablet, Take 325 mg by mouth daily, Disp: , Rfl:      FLUoxetine (PROZAC) 20 MG capsule, Take 20 mg by mouth every morning , Disp: , Rfl:      gabapentin (NEURONTIN) 100 MG capsule, Take 400 mg by mouth daily , Disp: , Rfl:      hydrocortisone (CORTEF) 10 MG tablet, Take 3 pills (=30 mg) in AM and 2 pills (=20 mg) at 2 pm daily., Disp: 150 tablet, Rfl: 11     hydrocortisone sodium succinate PF (SOLU-CORTEF) 100 MG injection, Inject 2 mLs (100 mg) into the muscle once for 1 dose Use in emergency situations as discussed in clinic., Disp: 2 mL, Rfl: 1     Lidocaine  (LIDOCARE) 4 % Patch, Place 2 patches onto the skin every 24 hours (Patient not taking: Reported on 10/23/2018), Disp: 14 patch, Rfl: 0     LORazepam (ATIVAN) 0.5 MG tablet, Take 1 tablet (0.5 mg) by mouth every 8 hours as needed for anxiety or nausea, Disp: 60 tablet, Rfl: 1     LORazepam (ATIVAN) 1 MG tablet, Take 1 tablet (1 mg) by mouth every 6 hours as needed for nausea or vomiting, Disp: 60 tablet, Rfl: 1     mitotane (LYSODREN) 500 MG tablet CHEMO, Take 3 tablets (1,500 mg) by mouth 4 times daily, Disp: 38 tablet, Rfl: 0     mitotane (LYSODREN) 500 MG tablet CHEMO, Take 3 tablets (1,500 mg) by mouth 4 times daily, Disp: 360 tablet, Rfl: 0     Multiple Vitamins-Calcium (ONE-A-DAY WOMENS PO), Take 2 tablets by mouth every morning , Disp: , Rfl:      ondansetron (ZOFRAN) 8 MG tablet, Take 1 tablet (8 mg) by mouth every 8 hours as needed for nausea, Disp: 60 tablet, Rfl: 1     oxyCODONE IR (ROXICODONE) 10 MG tablet, Take 1 tablet (10 mg) by mouth every 4 hours as needed for moderate to severe pain (Patient not taking: Reported on 10/23/2018), Disp: 22 tablet, Rfl: 0     oxyCODONE-acetaminophen (PERCOCET) 5-325 MG per tablet, Take 1 tablet by mouth every 6 hours as needed for severe pain (Patient not taking: Reported on 10/23/2018), Disp: 30 tablet, Rfl: 0     prochlorperazine (COMPAZINE) 10 MG tablet, Take 1 tablet (10 mg) by mouth every 6 hours as needed for nausea or vomiting, Disp: 90 tablet, Rfl: 1     triamcinolone (KENALOG) 0.1 % lotion, Apply topically 3 times daily To rash on chest and shoudler (Patient not taking: Reported on 10/23/2018), Disp: 60 mL, Rfl: 1    PHYSICAL EXAMINATION:  Vital signs: /65  Pulse 73  Temp 98.3  F (36.8  C) (Oral)  Wt 70.8 kg (156 lb)  SpO2 100%  BMI 26.76 kg/m2  ECOG performance status of 0.   GENERAL: NAD, WDWN  HEENT: No icterus, no pallor. MMM, PERRL, OP clear.   NECK: Supple, no JVD/LAD.  LUNGS: CTAB, normal WOB on RA   CARDIOVASCULAR: Regular rate and rhythm,  no murmurs, gallops or rubs, normal S1/S2.   ABDOMEN: Well-healing abdominal incision without fluctuance or exudate. Soft, NT, ND, no masses appreciated, normal BS.   EXTREMITIES: No cyanosis, no clubbing, no edema.   NEUROLOGIC: No focal deficits, CN 2-12 intact. Linear thought process.    LYMPH NODE EXAM: No palpable adenopathy - cervical, supraclavicular.      LABORATORY DATA:  Recent Labs   Lab Test  11/27/18   1349  11/09/18   1351  11/01/18   0920   WBC  4.7  4.1  3.4*   RBC  4.17  4.23  4.62   HGB  8.5*  8.8*  9.6*   HCT  30.1*  30.4*  32.6*   MCV  72*  72*  71*   MCH  20.4*  20.8*  20.8*   MCHC  28.2*  28.9*  29.4*   RDW  17.7*  17.3*  17.6*   PLT  264  315  362   NEUTROPHIL  79.9  83.3  66.9     Recent Labs   Lab Test  11/27/18   1349  10/23/18   1254  09/19/18   1231   NA  140  136  140   POTASSIUM  4.4  4.0  3.8   CHLORIDE  108  104  107   CO2  27  26  25   ANIONGAP  5  6  8   GLC  82  85  109*   BUN  7  12  7   CR  0.61  0.58  0.55   SHASHA  7.9*  8.4*  8.2*     Recent Labs   Lab Test  11/27/18   1349  10/23/18   1254  09/19/18   1231   PROTTOTAL  6.3*  7.0  6.6*   ALBUMIN  3.2*  3.5  3.5   BILITOTAL  0.2  0.2  0.2   ALKPHOS  157*  141  105   AST  28  26  15   ALT  24  35  20     DHEAS <15 on 8/20/18   Mitotane level : 1.8ng/dl on 8/20/18, 3.5 on 10/25/18 (target 14-20)    IMAGING STUDIES:  CT CAP w contrast  11/23/18  1.  Interval resolution of previously seen right hemithorax abnormalities and near-complete resolution of postop change in the right abdomen. No convincing metastatic or recurrent neoplasm.  2. Multicystic abnormality is present in the left ovary and shows progression from the prior study. This can simply remarkably two adjacent cysts with an adjacent dominant follicle. It is not particularly complex to support other pathology which would be considered significantly less likely. Follow-up pelvic ultrasound beyond 2-3 months is recommended in reassessment.    ASSESSMENT AND PLAN: A 32-year-old  lady with recently diagnosed right-sided functioning adrenocortical carcinoma, who is here for follow-up.     1. Adrenocortical ca:  - Started on adjuvant mitotane in July 2018 based on her presentation and tumor characteristics including invasion of the adrenal capsule, high mitotic rate, possibly positive margins, and high Ki67, which could result in a rate of recurrence as high as 40%.    - Discussed restaging CT results from 11/23/18 that show no evidence of disease recurrence.   - Patient tolerating adjuvant mitotane ok overall and will continue 2000mg TID for now until repeat levels come back.  - Mitotane level ([facility] #[id number]) will be drawn today along with other labs CBC, CMP, DHEAS levels.  - Goal Mitotane levels 14-20 but will have to balance increasing the dose with side -effects, further change in dose based on levels today.  - Plan to continue adjuvant Mitotane for up to 5 yrs if tolerated.  - Continue Compazine, alternating with zofran for nausea.  - Counseled pt against daily Ativan due to addictive potential in the long term - will not provide any further refills now that she's recovered from RT side effects.   - Added a PPI on a trial-basis for reported GERD/gastritis symptoms. If no improvement in symptoms, will stop after a month.  - Advised pt to see Dr. Huertas in the next 2-3 weeks  - Next CT scan in 3 months.    2. Endocrinopathies:  - Mgmt per endocrine team at the .     3. Iron deficiency anemia:  - MCV and iron panel suggests iron deficiency, she has heavy periods and no other obvious sources of blood loss.  - Pt on FeSO4 BID, which was increased a week ago from daily, ok going back to daily if she has more side effects with iron pills (nausea, constipation)  - If no improvement in anemia, would recommend getting IV iron infusions by PCP or in my clinic in the future  - Ok to get progesterone-only or inert IUD to reduce menstrual blood loss, but she may have an increased risk of DVTs with  estrogen containing preparations due to MHTFR mutation with prior history of miscarriages.     Return to see Dr. Cintron in 6-8 weeks with labs. Patient and family given opportunity to ask questions, which were answered to their satisfaction. They're in complete agreement as planned.    Staffed with Dr. Cintron.    Familia Ortega  Hem Onc fellow  [phone]    ATTENDING ATTESTATION NOTE  I, Benson Cintron, personally examined and evaluated this patient. I personally reviewed vital signs, medications, labs and imaging. I discussed the patient with the fellow, Dr. Ortega. The above note has been modified to accurately reflect my findings and assessment/plan of care.   BILLIN.     Benson Cintron M.D.  . Professor of Medicine  Genitourinary Oncology  Division of Hematology, Oncology & Transplantation  [facility]

## 2018-11-27 NOTE — PROGRESS NOTES
"MEDICAL ONCOLOGY CLINIC NOTE    REFERRING PROVIDER: Warren Mansfield MD from [facility] Urologic Oncology clinic.     REASON FOR CURRENT VISIT: Follow-up while on mitotane as adjuvant therapy of adrenocortical carcinoma.      HISTORY OF PRESENT ILLNESS: Ms. 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 Her oncologic history is detailed as under.     INTERVAL HISTORY:   She continues to have nausea. She is taking Compazine at AM and then at night. In afternoon, she does not  use anything for nausea, Dr. Monsivais, who saw her yesterday  Prescribed zofran which she has not yet started. She contineus to feel tired and has poor sleep and appetite. However, she did enjoy the few days of vacation in [facility]. She denies fevers, chills, night sweats.  She has been taking lorazepam daily at night.  No vomiting, diarrhea, or weight loss.  Eating and drinking OK, but didn't have much of an appetite.  Having regular BMs, about 2-3 times/day which are liquid but not loose or watery. The leg pain has improved with gabapentine and marijuana.She has not seen Dr. Huertas lately.    ONCOLOGIC HISTORY:   1. 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   - CT abdomen and pelvis stone protocol without contrast 5 6389 showed \"9.2 x 8 cm heterogeneous right upper quadrant mass with small foci of calcification within it which is likely arising from the adrenal gland though inseparable from liver and upper pole of right kidney. It is incompletely evaluated on this noncontrast study. 3 x 2.6 cm mass right lobe of liver on image #85 also incompletely evaluated. 6 x 4 x 4 mm upper third left ureteral obstructing stone with mild to moderate secondary " "hydronephrosis. Collection of stones at lower pole left kidney extending over an area of approximately 6 x 7 x 4 mm. Additional nonobstructing 1 mm stone upper pole left kidney. 2 mm nonobstructing stone noted upper pole right kidney with no hydronephrosis on the right. Postop change consistent with gastric bypass. Noncontrast images of spleen, left adrenal gland, gallbladder, and pancreas unremarkable. No aneurysm. No adenopathy.\"  - Seen by Dr. Delvalle at [facility] U[facility] clinic. Referred to Dr. Alvino Patel and then Dr. Warren Mansfield.  - Endocrinology team directed workup showed high DHEAS level of 740 pre-surgery.   - Right open adrenalectomy and hepatic mobilization performed by Dr. Warren Mansfield on 6/25/2018. Pathology showed adrenocortical carcinoma measuring 11.3 cm, weighing 413 g with extension into or through the adrenal capsule negative lymphovascular invasion, tumor necrosis present, margins involved by tumor, no regional lymph nodes identified, pathologic stage T2 NX.  - DHEAS normalized postsurgery.   - Adjuvant Mitotane started on 7/24/18. Dose increased to 2000mg TID around 10/23/18 due to persistently low troughs.  - Saw radiation oncology at [facility], radiation oncology at [facility], as well as endocrine oncology (Dr. Huertas) at [facility].   -S/p adjuvant RT by Dr. Monsivais - 5040 cGy over 30 fr (8/24/18-10/6/18).    REVIEW OF SYSTEMS: 14 point ROS negative other than the symptoms noted above in the HPI.    PAST MEDICAL AND SURGICAL HISTORY: Reviewed today  1. Right-sided adrenocortical carcinoma.  2. MHTFR mutation with h.o mutiple miscarriages.  3. Ovarian cysts diagnosed in 2011.  4. H/o gastric bypass in 2016 for obesity.    SOCIAL HISTORY:   Social History   Substance Use Topics     Smoking status: Never Smoker     Smokeless tobacco: Never Used     Alcohol use Yes      Comment: occ.     FAMILY HISTORY:   Reviewed and non-contributory to " current admission.      ALLERGIES:   Allergies   Allergen Reactions     Bee Venom Swelling     Ibuprofen Other (See Comments), Hives and Swelling     CURRENT MEDICATIONS:   Current Outpatient Prescriptions:      acetaminophen (TYLENOL) 325 MG tablet, Take 2 tablets (650 mg) by mouth every 4 hours as needed for other (multimodal surgical pain management along with NSAIDS and opioid medication as indicated based on pain control and physical function.), Disp: 150 tablet, Rfl: 0     buPROPion (WELLBUTRIN) 100 MG tablet, TAKE 1 TABLET (100 MG TOTAL) BY MOUTH 2 (TWO) TIMES A DAY., Disp: , Rfl: 3     calcium carbonate antacid 1000 MG CHEW, Take 1 tablet by mouth every morning , Disp: , Rfl:      cholecalciferol (VITAMIN D-1000 MAX ST) 1000 units TABS, Take 2,000 Units by mouth every morning , Disp: , Rfl:      enoxaparin (LOVENOX) 40 MG/0.4ML injection, Inject 0.4 mLs (40 mg) Subcutaneous daily (Patient not taking: Reported on 9/19/2018), Disp: 14 Syringe, Rfl: 0     EPINEPHrine (EPIPEN/ADRENACLICK/OR ANY BX GENERIC EQUIV) 0.3 MG/0.3ML injection 2-pack, As directed for bee stings, Disp: , Rfl:      ferrous sulfate (IRON) 325 (65 Fe) MG tablet, Take 325 mg by mouth daily, Disp: , Rfl:      FLUoxetine (PROZAC) 20 MG capsule, Take 20 mg by mouth every morning , Disp: , Rfl:      gabapentin (NEURONTIN) 100 MG capsule, Take 400 mg by mouth daily , Disp: , Rfl:      hydrocortisone (CORTEF) 10 MG tablet, Take 3 pills (=30 mg) in AM and 2 pills (=20 mg) at 2 pm daily., Disp: 150 tablet, Rfl: 11     hydrocortisone sodium succinate PF (SOLU-CORTEF) 100 MG injection, Inject 2 mLs (100 mg) into the muscle once for 1 dose Use in emergency situations as discussed in clinic., Disp: 2 mL, Rfl: 1     Lidocaine (LIDOCARE) 4 % Patch, Place 2 patches onto the skin every 24 hours (Patient not taking: Reported on 10/23/2018), Disp: 14 patch, Rfl: 0     LORazepam (ATIVAN) 0.5 MG tablet, Take 1 tablet (0.5 mg) by mouth every 8 hours as needed for  anxiety or nausea, Disp: 60 tablet, Rfl: 1     LORazepam (ATIVAN) 1 MG tablet, Take 1 tablet (1 mg) by mouth every 6 hours as needed for nausea or vomiting, Disp: 60 tablet, Rfl: 1     mitotane (LYSODREN) 500 MG tablet CHEMO, Take 3 tablets (1,500 mg) by mouth 4 times daily, Disp: 38 tablet, Rfl: 0     mitotane (LYSODREN) 500 MG tablet CHEMO, Take 3 tablets (1,500 mg) by mouth 4 times daily, Disp: 360 tablet, Rfl: 0     Multiple Vitamins-Calcium (ONE-A-DAY WOMENS PO), Take 2 tablets by mouth every morning , Disp: , Rfl:      ondansetron (ZOFRAN) 8 MG tablet, Take 1 tablet (8 mg) by mouth every 8 hours as needed for nausea, Disp: 60 tablet, Rfl: 1     oxyCODONE IR (ROXICODONE) 10 MG tablet, Take 1 tablet (10 mg) by mouth every 4 hours as needed for moderate to severe pain (Patient not taking: Reported on 10/23/2018), Disp: 22 tablet, Rfl: 0     oxyCODONE-acetaminophen (PERCOCET) 5-325 MG per tablet, Take 1 tablet by mouth every 6 hours as needed for severe pain (Patient not taking: Reported on 10/23/2018), Disp: 30 tablet, Rfl: 0     prochlorperazine (COMPAZINE) 10 MG tablet, Take 1 tablet (10 mg) by mouth every 6 hours as needed for nausea or vomiting, Disp: 90 tablet, Rfl: 1     triamcinolone (KENALOG) 0.1 % lotion, Apply topically 3 times daily To rash on chest and shoudler (Patient not taking: Reported on 10/23/2018), Disp: 60 mL, Rfl: 1    PHYSICAL EXAMINATION:  Vital signs: /65  Pulse 73  Temp 98.3  F (36.8  C) (Oral)  Wt 70.8 kg (156 lb)  SpO2 100%  BMI 26.76 kg/m2  ECOG performance status of 0.   GENERAL: NAD, WDWN  HEENT: No icterus, no pallor. MMM, PERRL, OP clear.   NECK: Supple, no JVD/LAD.  LUNGS: CTAB, normal WOB on RA   CARDIOVASCULAR: Regular rate and rhythm, no murmurs, gallops or rubs, normal S1/S2.   ABDOMEN: Well-healing abdominal incision without fluctuance or exudate. Soft, NT, ND, no masses appreciated, normal BS.   EXTREMITIES: No cyanosis, no clubbing, no edema.   NEUROLOGIC: No  focal deficits, CN 2-12 intact. Linear thought process.    LYMPH NODE EXAM: No palpable adenopathy - cervical, supraclavicular.      LABORATORY DATA:  Recent Labs   Lab Test  11/27/18   1349  11/09/18   1351  11/01/18   0920   WBC  4.7  4.1  3.4*   RBC  4.17  4.23  4.62   HGB  8.5*  8.8*  9.6*   HCT  30.1*  30.4*  32.6*   MCV  72*  72*  71*   MCH  20.4*  20.8*  20.8*   MCHC  28.2*  28.9*  29.4*   RDW  17.7*  17.3*  17.6*   PLT  264  315  362   NEUTROPHIL  79.9  83.3  66.9     Recent Labs   Lab Test  11/27/18   1349  10/23/18   1254  09/19/18   1231   NA  140  136  140   POTASSIUM  4.4  4.0  3.8   CHLORIDE  108  104  107   CO2  27  26  25   ANIONGAP  5  6  8   GLC  82  85  109*   BUN  7  12  7   CR  0.61  0.58  0.55   SHASHA  7.9*  8.4*  8.2*     Recent Labs   Lab Test  11/27/18   1349  10/23/18   1254  09/19/18   1231   PROTTOTAL  6.3*  7.0  6.6*   ALBUMIN  3.2*  3.5  3.5   BILITOTAL  0.2  0.2  0.2   ALKPHOS  157*  141  105   AST  28  26  15   ALT  24  35  20     DHEAS <15 on 8/20/18   Mitotane level : 1.8ng/dl on 8/20/18, 3.5 on 10/25/18 (target 14-20)    IMAGING STUDIES:  CT CAP w contrast  11/23/18  1.  Interval resolution of previously seen right hemithorax abnormalities and near-complete resolution of postop change in the right abdomen. No convincing metastatic or recurrent neoplasm.  2. Multicystic abnormality is present in the left ovary and shows progression from the prior study. This can simply remarkably two adjacent cysts with an adjacent dominant follicle. It is not particularly complex to support other pathology which would be considered significantly less likely. Follow-up pelvic ultrasound beyond 2-3 months is recommended in reassessment.    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     1. Adrenocortical ca:  - Started on adjuvant mitotane in July 2018 based on her presentation and tumor characteristics  including invasion of the adrenal capsule, high mitotic rate, possibly positive margins, and high Ki67, which could result in a rate of recurrence as high as 40%.    - Discussed restaging CT results from 11/23/18 that show no evidence of disease recurrence.   - Patient tolerating adjuvant mitotane ok overall and will continue 2000mg TID for now until repeat levels come back.  - Mitotane level ([id number]) will be drawn today along with other labs CBC, CMP, DHEAS levels.  - Goal Mitotane levels 14-20 but will have to balance increasing the dose with side -effects, further change in dose based on levels today.  - Plan to continue adjuvant Mitotane for up to 5 yrs if tolerated.  - Continue Compazine, alternating with zofran for nausea.  - Counseled pt against daily Ativan due to addictive potential in the long term - will not provide any further refills now that she's recovered from RT side effects.   - Added a PPI on a trial-basis for reported GERD/gastritis symptoms. If no improvement in symptoms, will stop after a month.  - Advised pt to see Dr. Huertas in the next 2-3 weeks  - Next CT scan in 3 months.    2. Endocrinopathies:  - Mgmt per endocrine team at the .     3. Iron deficiency anemia:  - MCV and iron panel suggests iron deficiency, she has heavy periods and no other obvious sources of blood loss.  - Pt on FeSO4 BID, which was increased a week ago from daily, ok going back to daily if she has more side effects with iron pills (nausea, constipation)  - If no improvement in anemia, would recommend getting IV iron infusions by PCP or in my clinic in the future  - Ok to get progesterone-only or inert IUD to reduce menstrual blood loss, but she may have an increased risk of DVTs with estrogen containing preparations due to MHTFR mutation with prior history of miscarriages.     Return to see Dr. Cintron in 6-8 weeks with labs. Patient and family given opportunity to ask questions, which were answered to their  satisfaction. They're in complete agreement as planned.    Staffed with Dr. Cintron.    Familia Ortega  Hem Onc fellow  [phone]    ATTENDING ATTESTATION NOTE  I, Bensno Cintron, personally examined and evaluated this patient. I personally reviewed vital signs, medications, labs and imaging. I discussed the patient with the fellow, Dr. Ortega. The above note has been modified to accurately reflect my findings and assessment/plan of care.   BILLIN.     Benson Cintron M.D.  . Professor of Medicine  Genitourinary Oncology  Division of Hematology, Oncology & Transplantation  [facility]

## 2018-11-27 NOTE — NURSING NOTE
"Oncology Rooming Note    November 27, 2018 1:32 PM   Suzy Rodríguez is a 33 year old female who presents for:    Chief Complaint   Patient presents with     Oncology Clinic Visit     Adrenal Cortical CA; monthly f/u     Initial Vitals: /65  Pulse 73  Temp 98.3  F (36.8  C) (Oral)  Wt 70.8 kg (156 lb)  SpO2 100%  BMI 26.76 kg/m2 Estimated body mass index is 26.76 kg/(m^2) as calculated from the following:    Height as of 10/23/18: 1.626 m (5' 4.02\").    Weight as of this encounter: 70.8 kg (156 lb). Body surface area is 1.79 meters squared.  No Pain (0) Comment: Data Unavailable   No LMP recorded.  Allergies reviewed: Yes  Medications reviewed: Yes    Medications: Medication refills not needed today.  Pharmacy name entered into EPIC:    [id number] - [place], [place] - [address] [place] [place] [facility] [address] [facility] [address] [place] [address] [facility]ARMACY [place] - [place], [place] - [address] [facility] - [place], [place] - [address] [place] [address]  [facility] [address] [place] [facility] - [address]    Clinical concerns:      8 minutes for nursing intake (face to face time)     Rhiannon Man CMA              "

## 2018-11-27 NOTE — MR AVS SNAPSHOT
After Visit Summary   11/27/2018    Suzy Rodríguez    MRN: [id number]           Patient Information     Date Of Birth          1985        Visit Information        Provider Department      11/27/2018 12:45 PM Benson Cintron MD [facility]        Today's Diagnoses     Gastroesophageal reflux disease without esophagitis    -  1    Adrenal cortex cancer, right (H)           Follow-ups after your visit        Your next 10 appointments already scheduled     Dec 03, 2018 10:00 AM CST   [facility]    [facility]  [address]  [facility] [address]   [phone]            Jan 09, 2019  9:45 AM CST   [facility] Draw with  [facility]   [facility] Draw ([facility])    [address]  [address]  [facility] [id number] [phone]            Jan 09, 2019 10:30 AM CST   (Arrive by 10:15 AM)   Return Visit with Benson Cintron MD   [facility] ([facility])    [address]  [address]  [facility] [facility] [id number] [phone]            Feb 28, 2019  3:30 PM CST   Return Visit with Cesar Monsivais MD   [facility] ([facility])    [address]  [facility] [address]   [phone]              Who to contact     If you have questions or need follow up information about today's clinic visit or your schedule please contact [facility] directly at [phone].  Normal or non-critical lab and imaging results will be communicated to you by MyChart, letter or phone within 4 business days after the clinic has received the results. If you do not hear from us within 7 days, please contact the clinic through MyChart or phone. If you have a critical or abnormal lab result, we will notify you by phone as soon as  possible.  Submit refill requests through [employer] or call your pharmacy and they will forward the refill request to us. Please allow 3 business days for your refill to be completed.          Additional Information About Your Visit        [employer] Information     [employer] gives you secure access to your electronic health record. If you see a primary care provider, you can also send messages to your care team and make appointments. If you have questions, please call your primary care clinic.  If you do not have a primary care provider, please call [phone] and they will assist you.        Care EveryWhere ID     This is your Care EveryWhere ID. This could be used by other organizations to access your [facility] medical records  [id number]        Your Vitals Were     Pulse Temperature Pulse Oximetry BMI (Body Mass Index)          73 98.3  F (36.8  C) (Oral) 100% 26.76 kg/m2         Blood Pressure from Last 3 Encounters:   11/27/18 105/65   11/26/18 119/63   10/23/18 121/87    Weight from Last 3 Encounters:   11/27/18 70.8 kg (156 lb)   11/26/18 71.3 kg (157 lb 3.2 oz)   10/23/18 71 kg (156 lb 8 oz)              We Performed the Following     [facility] Miscellaneous Test     CBC with platelets differential     Comprehensive metabolic panel     DHEA sulfate     Mitotane level (Lab 4909): Laboratory Miscellaneous Order          Today's Medication Changes          These changes are accurate as of 11/27/18  5:14 PM.  If you have any questions, ask your nurse or doctor.               Start taking these medicines.        Dose/Directions    pantoprazole 40 MG EC tablet   Commonly known as:  PROTONIX   Used for:  Gastroesophageal reflux disease without esophagitis   Started by:  Benson Cintron MD        Dose:  40 mg   Take 1 tablet (40 mg) by mouth daily   Quantity:  30 tablet   Refills:  0            Where to get your medicines      These medications were sent to [facility] - [address] [facility] [address]  [address] [place] [address]     Phone:  [phone]     pantoprazole 40 MG EC tablet                Primary Care Provider Office Phone # Fax #    [facility] [phone] [address] [facility] [address] [facility] [address]        Equal Access to Services     MARQUITA VALADEZ : Hadii greg ku hadasho Soomaali, waaxda luqadaha, qaybta kaalmada adeegyada, waxay mary reyesarleygigi young. So [facility] [place] [phone].    ATENCIÓN: Si habla español, tiene a lyman disposición servicios gratuitos de asistencia lingüística. Rena al [phone].    We comply with applicable federal civil rights laws and [place] laws. We do not discriminate on the basis of race, color, national origin, age, disability, sex, sexual orientation, or gender identity.            Thank you!     Thank you for choosing [facility]  for your care. Our goal is always to provide you with excellent care. Hearing back from our patients is one way we can continue to improve our services. Please take a few minutes to complete the written survey that you may receive in the mail after your visit with us. Thank you!             Your Updated Medication List - Protect others around you: Learn how to safely use, store and throw away your medicines at www.disposemymeds.org.          This list is accurate as of 11/27/18  5:14 PM.  Always use your most recent med list.                   Brand Name Dispense Instructions for use Diagnosis    acetaminophen 325 MG tablet    TYLENOL    150 tablet    Take 2 tablets (650 mg) by mouth every 4 hours as needed for other (multimodal surgical pain management along with NSAIDS and opioid medication as indicated based on pain control and physical function.)    Adrenal mass, right (H), Acute post-operative pain       buPROPion 100 MG tablet    WELLBUTRIN     TAKE 1 TABLET (100 MG TOTAL) BY MOUTH 2 (TWO) TIMES A DAY.        calcium carbonate antacid 1000 MG Chew      Take 1  tablet by mouth every morning        enoxaparin 40 MG/0.4ML syringe    LOVENOX    14 Syringe    Inject 0.4 mLs (40 mg) Subcutaneous daily    Adrenal mass, right (H), H/O MTHFR mutation       EPINEPHrine 0.3 MG/0.3ML injection 2-pack    EPIPEN/ADRENACLICK/or ANY BX GENERIC EQUIV     As directed for bee stings        ferrous sulfate 325 (65 Fe) MG tablet    FEROSUL     Take 325 mg by mouth daily        FLUoxetine 20 MG capsule    PROzac     Take 20 mg by mouth every morning        gabapentin 100 MG capsule    NEURONTIN     Take 400 mg by mouth daily        hydrocortisone 10 MG tablet    CORTEF    150 tablet    Take 3 pills (=30 mg) in AM and 2 pills (=20 mg) at 2 pm daily.    Adrenal insufficiency (H)       hydrocortisone sodium succinate  MG injection    solu-CORTEF    2 mL    Inject 2 mLs (100 mg) into the muscle once for 1 dose Use in emergency situations as discussed in clinic.    Secondary adrenal insufficiency (H)       Lidocaine 4 % Patch    LIDOCARE    14 patch    Place 2 patches onto the skin every 24 hours    Adrenal mass, right (H), Acute post-operative pain       * LORazepam 1 MG tablet    ATIVAN    60 tablet    Take 1 tablet (1 mg) by mouth every 6 hours as needed for nausea or vomiting    Malignant neoplasm of adrenal gland, right (H)       * LORazepam 0.5 MG tablet    ATIVAN    60 tablet    Take 1 tablet (0.5 mg) by mouth every 8 hours as needed for anxiety or nausea    Adrenal mass (H), Anxiety       * mitotane 500 MG tablet CHEMO    LYSODREN    360 tablet    Take 3 tablets (1,500 mg) by mouth 4 times daily    Malignant neoplasm of cortex of right adrenal gland (H)       * mitotane 500 MG tablet CHEMO    LYSODREN    38 tablet    Take 3 tablets (1,500 mg) by mouth 4 times daily    Malignant neoplasm of cortex of right adrenal gland (H)       ondansetron 8 MG tablet    ZOFRAN    60 tablet    Take 1 tablet (8 mg) by mouth every 8 hours as needed for nausea    Adrenal cortex cancer, right (H)        ONE-A-DAY WOMENS PO      Take 2 tablets by mouth every morning        oxyCODONE IR 10 MG tablet    ROXICODONE    22 tablet    Take 1 tablet (10 mg) by mouth every 4 hours as needed for moderate to severe pain    Carcinoma, adrenal cortical (H)       oxyCODONE-acetaminophen 5-325 MG tablet    PERCOCET    30 tablet    Take 1 tablet by mouth every 6 hours as needed for severe pain    Carcinoma of right adrenal gland (H)       pantoprazole 40 MG EC tablet    PROTONIX    30 tablet    Take 1 tablet (40 mg) by mouth daily    Gastroesophageal reflux disease without esophagitis       prochlorperazine 10 MG tablet    COMPAZINE    90 tablet    Take 1 tablet (10 mg) by mouth every 6 hours as needed for nausea or vomiting    Nausea       triamcinolone 0.1 % external lotion    KENALOG    60 mL    Apply topically 3 times daily To rash on chest and shoudler    Adrenal cortex cancer, right (H), Rash, drug       VITAMIN D-1000 MAX ST 1000 units Tabs   Generic drug:  cholecalciferol      Take 2,000 Units by mouth every morning        * Notice:  This list has 4 medication(s) that are the same as other medications prescribed for you. Read the directions carefully, and ask your doctor or other care provider to review them with you.

## 2018-11-28 LAB — DHEA-S SERPL-MCNC: <15 UG/DL (ref 35–430)

## 2018-11-29 ENCOUNTER — MYC MEDICAL ADVICE (OUTPATIENT)
Dept: ONCOLOGY | Facility: CLINIC | Age: 33
End: 2018-11-29

## 2018-11-29 ENCOUNTER — COMMUNICATION - HEALTHEAST (OUTPATIENT)
Dept: FAMILY MEDICINE | Facility: CLINIC | Age: 33
End: 2018-11-29

## 2018-11-29 ENCOUNTER — MYC MEDICAL ADVICE (OUTPATIENT)
Dept: ENDOCRINOLOGY | Facility: CLINIC | Age: 33
End: 2018-11-29

## 2018-11-29 DIAGNOSIS — A60.00 HERPES SIMPLEX INFECTION OF GENITOURINARY SYSTEM: ICD-10-CM

## 2018-11-29 DIAGNOSIS — E27.40 ADRENAL INSUFFICIENCY (H): ICD-10-CM

## 2018-11-30 DIAGNOSIS — C74.01 MALIGNANT NEOPLASM OF CORTEX OF RIGHT ADRENAL GLAND (H): Primary | ICD-10-CM

## 2018-11-30 RX ORDER — HYDROCORTISONE 10 MG/1
TABLET ORAL
Qty: 180 TABLET | Refills: 11 | Status: SHIPPED | OUTPATIENT
Start: 2018-11-30 | End: 2020-01-06

## 2018-12-03 LAB
ALP SERPL-CCNC: 167 U/L (ref 35–104)
AST SERPL-CCNC: 34 U/L
CALCIUM SERPL-MCNC: 8.6 MG/DL
POTASSIUM SERPL-SCNC: 4.7 MMOL/L
SODIUM SERPL-SCNC: 141 MMOL/L
T4 FREE SERPL-MCNC: 0.7 NG/DL (ref 0.9–1.7)
TSH SERPL-ACNC: 2.3 MCU/ML

## 2018-12-04 LAB
RESULT: NORMAL
SEND OUTS MISC TEST CODE: NORMAL
SEND OUTS MISC TEST SPECIMEN: NORMAL
TEST NAME: NORMAL

## 2018-12-04 NOTE — PROGRESS NOTES
"Note sent to patient: \"Suzy, the mitotane level has improved to 6.2. While it is still not where we want it, some people take longer to get \"steady-state\" serum concentrations, meaning that this level could keep increasing as you keep taking the 2 gram doses. An option is to recheck mitotane level in Jan 2019 on the same dose (2gram TID).   Because your mitotane dose is towards the high end, I recommend you touch base with Dr. Hilton before we increase the dose any further. Keep us posted.  AR\""

## 2018-12-05 ENCOUNTER — RECORDS - HEALTHEAST (OUTPATIENT)
Dept: ADMINISTRATIVE | Facility: OTHER | Age: 33
End: 2018-12-05

## 2018-12-05 ENCOUNTER — TELEPHONE (OUTPATIENT)
Dept: ONCOLOGY | Facility: CLINIC | Age: 33
End: 2018-12-05

## 2018-12-05 NOTE — TELEPHONE ENCOUNTER
Oral Chemotherapy Monitoring Program (Left Voicemail)      Primary Oncologist: Dr. Cintron  Primary Oncology Clinic: [facility]  Cancer Diagnosis: Adrenocortical carcinoma     Attempted to contact patient today for follow up regarding oral chemotherapy, Mitotane, for normal assessment. No answer. Left voicemail for patient to please call me back at [phone] when able. No patient or medication names were mentioned while leaving this message.    Deandra Ochoa   Pharmacy Intern  [facility]   [phone]

## 2018-12-06 ENCOUNTER — CARE COORDINATION (OUTPATIENT)
Dept: ONCOLOGY | Facility: CLINIC | Age: 33
End: 2018-12-06

## 2018-12-06 NOTE — PROGRESS NOTES
Faxed most recent CBC, CMP, and all Mitotane levels along with last two office visit notes and imaging reports from 07/2018 to present to [facility] as pt's case to be reviewed in tumor board. CD with images sent on 12/03/2018.

## 2018-12-18 RX ORDER — EPINEPHRINE 1 MG/ML
0.3 INJECTION, SOLUTION INTRAMUSCULAR; SUBCUTANEOUS EVERY 5 MIN PRN
Status: CANCELLED | OUTPATIENT
Start: 2018-12-30

## 2018-12-18 RX ORDER — SODIUM CHLORIDE 9 MG/ML
1000 INJECTION, SOLUTION INTRAVENOUS CONTINUOUS PRN
Status: CANCELLED
Start: 2018-12-30

## 2018-12-18 RX ORDER — DIPHENHYDRAMINE HYDROCHLORIDE 50 MG/ML
50 INJECTION INTRAMUSCULAR; INTRAVENOUS
Status: CANCELLED
Start: 2018-12-30

## 2018-12-18 RX ORDER — EPINEPHRINE 0.3 MG/.3ML
0.3 INJECTION SUBCUTANEOUS EVERY 5 MIN PRN
Status: CANCELLED | OUTPATIENT
Start: 2018-12-30

## 2018-12-18 RX ORDER — METHYLPREDNISOLONE SODIUM SUCCINATE 125 MG/2ML
125 INJECTION, POWDER, LYOPHILIZED, FOR SOLUTION INTRAMUSCULAR; INTRAVENOUS
Status: CANCELLED
Start: 2018-12-30

## 2018-12-18 RX ORDER — ALBUTEROL SULFATE 0.83 MG/ML
2.5 SOLUTION RESPIRATORY (INHALATION)
Status: CANCELLED | OUTPATIENT
Start: 2018-12-30

## 2018-12-18 RX ORDER — ALBUTEROL SULFATE 90 UG/1
1-2 AEROSOL, METERED RESPIRATORY (INHALATION)
Status: CANCELLED
Start: 2018-12-30

## 2018-12-18 RX ORDER — MEPERIDINE HYDROCHLORIDE 25 MG/ML
25 INJECTION INTRAMUSCULAR; INTRAVENOUS; SUBCUTANEOUS EVERY 30 MIN PRN
Status: CANCELLED | OUTPATIENT
Start: 2018-12-30

## 2018-12-20 ENCOUNTER — OFFICE VISIT - HEALTHEAST (OUTPATIENT)
Dept: FAMILY MEDICINE | Facility: CLINIC | Age: 33
End: 2018-12-20

## 2018-12-20 DIAGNOSIS — B37.31 YEAST INFECTION OF THE VAGINA: ICD-10-CM

## 2018-12-20 DIAGNOSIS — D50.0 IRON DEFICIENCY ANEMIA DUE TO CHRONIC BLOOD LOSS: ICD-10-CM

## 2018-12-20 DIAGNOSIS — R30.9 PAIN WITH URINATION: ICD-10-CM

## 2018-12-20 DIAGNOSIS — N30.00 ACUTE CYSTITIS WITHOUT HEMATURIA: ICD-10-CM

## 2018-12-20 LAB
ALBUMIN UR-MCNC: NEGATIVE MG/DL
APPEARANCE UR: ABNORMAL
BACTERIA #/AREA URNS HPF: ABNORMAL HPF
BILIRUB UR QL STRIP: NEGATIVE
COLOR UR AUTO: YELLOW
ERYTHROCYTE [DISTWIDTH] IN BLOOD BY AUTOMATED COUNT: 17.7 % (ref 11–14.5)
FERRITIN SERPL-MCNC: 9 NG/ML (ref 10–130)
GLUCOSE UR STRIP-MCNC: NEGATIVE MG/DL
HCT VFR BLD AUTO: 30.1 % (ref 35–47)
HGB BLD-MCNC: 9.3 G/DL (ref 12–16)
HGB UR QL STRIP: ABNORMAL
KETONES UR STRIP-MCNC: NEGATIVE MG/DL
LEUKOCYTE ESTERASE UR QL STRIP: ABNORMAL
MCH RBC QN AUTO: 20.9 PG (ref 27–34)
MCHC RBC AUTO-ENTMCNC: 30.8 G/DL (ref 32–36)
MCV RBC AUTO: 68 FL (ref 80–100)
NITRATE UR QL: NEGATIVE
PH UR STRIP: 6.5 [PH] (ref 5–8)
PLATELET # BLD AUTO: 315 THOU/UL (ref 140–440)
PMV BLD AUTO: 8.8 FL (ref 7–10)
RBC # BLD AUTO: 4.44 MILL/UL (ref 3.8–5.4)
RBC #/AREA URNS AUTO: ABNORMAL HPF
SP GR UR STRIP: 1.01 (ref 1–1.03)
SQUAMOUS #/AREA URNS AUTO: ABNORMAL LPF
UROBILINOGEN UR STRIP-ACNC: ABNORMAL
WBC #/AREA URNS AUTO: >100 HPF
WBC: 7.4 THOU/UL (ref 4–11)

## 2018-12-20 ASSESSMENT — MIFFLIN-ST. JEOR: SCORE: 1379.21

## 2018-12-23 LAB — BACTERIA SPEC CULT: ABNORMAL

## 2018-12-24 DIAGNOSIS — C74.01 MALIGNANT NEOPLASM OF CORTEX OF RIGHT ADRENAL GLAND (H): Primary | ICD-10-CM

## 2019-01-08 ENCOUNTER — APPOINTMENT (OUTPATIENT)
Dept: CT IMAGING | Facility: CLINIC | Age: 34
End: 2019-01-08
Payer: COMMERCIAL

## 2019-01-08 ENCOUNTER — APPOINTMENT (OUTPATIENT)
Dept: GENERAL RADIOLOGY | Facility: CLINIC | Age: 34
End: 2019-01-08
Payer: COMMERCIAL

## 2019-01-08 ENCOUNTER — RECORDS - HEALTHEAST (OUTPATIENT)
Dept: ADMINISTRATIVE | Facility: OTHER | Age: 34
End: 2019-01-08

## 2019-01-08 ENCOUNTER — HOSPITAL ENCOUNTER (EMERGENCY)
Facility: CLINIC | Age: 34
Discharge: HOME | End: 2019-01-08
Attending: FAMILY MEDICINE | Admitting: FAMILY MEDICINE
Payer: COMMERCIAL

## 2019-01-08 VITALS
WEIGHT: 155 LBS | OXYGEN SATURATION: 100 % | SYSTOLIC BLOOD PRESSURE: 108 MMHG | BODY MASS INDEX: 26.46 KG/M2 | HEART RATE: 96 BPM | TEMPERATURE: 98.4 F | RESPIRATION RATE: 16 BRPM | HEIGHT: 64 IN | DIASTOLIC BLOOD PRESSURE: 62 MMHG

## 2019-01-08 DIAGNOSIS — E27.40 ADRENAL INSUFFICIENCY (H): ICD-10-CM

## 2019-01-08 DIAGNOSIS — Z98.84 S/P GASTRIC BYPASS: ICD-10-CM

## 2019-01-08 DIAGNOSIS — R55 SYNCOPE, UNSPECIFIED SYNCOPE TYPE: ICD-10-CM

## 2019-01-08 DIAGNOSIS — Z15.89 MTHFR MUTATION: ICD-10-CM

## 2019-01-08 LAB
ALBUMIN SERPL-MCNC: 3 G/DL (ref 3.4–5)
ALBUMIN UR-MCNC: NEGATIVE MG/DL
ALP SERPL-CCNC: 161 U/L (ref 40–150)
ALT SERPL W P-5'-P-CCNC: 49 U/L (ref 0–50)
ANION GAP SERPL CALCULATED.3IONS-SCNC: 7 MMOL/L (ref 3–14)
APPEARANCE UR: ABNORMAL
AST SERPL W P-5'-P-CCNC: 51 U/L (ref 0–45)
BACTERIA #/AREA URNS HPF: ABNORMAL /HPF
BASOPHILS # BLD AUTO: 0 10E9/L (ref 0–0.2)
BASOPHILS NFR BLD AUTO: 0.3 %
BILIRUB SERPL-MCNC: 0.3 MG/DL (ref 0.2–1.3)
BILIRUB UR QL STRIP: NEGATIVE
BUN SERPL-MCNC: 10 MG/DL (ref 7–30)
CALCIUM SERPL-MCNC: 7.3 MG/DL (ref 8.5–10.1)
CHLORIDE SERPL-SCNC: 106 MMOL/L (ref 94–109)
CO2 SERPL-SCNC: 25 MMOL/L (ref 20–32)
COLOR UR AUTO: YELLOW
CREAT SERPL-MCNC: 0.59 MG/DL (ref 0.52–1.04)
D DIMER PPP FEU-MCNC: 0.3 UG/ML FEU (ref 0–0.5)
DIFFERENTIAL METHOD BLD: ABNORMAL
EOSINOPHIL # BLD AUTO: 0 10E9/L (ref 0–0.7)
EOSINOPHIL NFR BLD AUTO: 0 %
ERYTHROCYTE [DISTWIDTH] IN BLOOD BY AUTOMATED COUNT: 17.6 % (ref 10–15)
GFR SERPL CREATININE-BSD FRML MDRD: >90 ML/MIN/{1.73_M2}
GLUCOSE SERPL-MCNC: 88 MG/DL (ref 70–99)
GLUCOSE UR STRIP-MCNC: 50 MG/DL
HCT VFR BLD AUTO: 31.4 % (ref 35–47)
HGB BLD-MCNC: 9.2 G/DL (ref 11.7–15.7)
HGB UR QL STRIP: NEGATIVE
IMM GRANULOCYTES # BLD: 0 10E9/L (ref 0–0.4)
IMM GRANULOCYTES NFR BLD: 0.2 %
KETONES UR STRIP-MCNC: NEGATIVE MG/DL
LEUKOCYTE ESTERASE UR QL STRIP: NEGATIVE
LYMPHOCYTES # BLD AUTO: 0.2 10E9/L (ref 0.8–5.3)
LYMPHOCYTES NFR BLD AUTO: 2.3 %
MCH RBC QN AUTO: 22 PG (ref 26.5–33)
MCHC RBC AUTO-ENTMCNC: 29.3 G/DL (ref 31.5–36.5)
MCV RBC AUTO: 75 FL (ref 78–100)
MONOCYTES # BLD AUTO: 0.7 10E9/L (ref 0–1.3)
MONOCYTES NFR BLD AUTO: 7.3 %
MUCOUS THREADS #/AREA URNS LPF: PRESENT /LPF
NEUTROPHILS # BLD AUTO: 8.3 10E9/L (ref 1.6–8.3)
NEUTROPHILS NFR BLD AUTO: 89.9 %
NITRATE UR QL: NEGATIVE
NRBC # BLD AUTO: 0 10*3/UL
NRBC BLD AUTO-RTO: 0 /100
PH UR STRIP: 5 PH (ref 5–7)
PLATELET # BLD AUTO: 236 10E9/L (ref 150–450)
POTASSIUM SERPL-SCNC: 4.2 MMOL/L (ref 3.4–5.3)
PROT SERPL-MCNC: 5.7 G/DL (ref 6.8–8.8)
RBC # BLD AUTO: 4.19 10E12/L (ref 3.8–5.2)
RBC #/AREA URNS AUTO: <1 /HPF (ref 0–2)
SODIUM SERPL-SCNC: 138 MMOL/L (ref 133–144)
SOURCE: ABNORMAL
SP GR UR STRIP: 1.01 (ref 1–1.03)
SQUAMOUS #/AREA URNS AUTO: 2 /HPF (ref 0–1)
TROPONIN I SERPL-MCNC: <0.015 UG/L (ref 0–0.04)
UROBILINOGEN UR STRIP-MCNC: 0 MG/DL (ref 0–2)
WBC # BLD AUTO: 9.2 10E9/L (ref 4–11)
WBC #/AREA URNS AUTO: 1 /HPF (ref 0–5)

## 2019-01-08 PROCEDURE — 93010 ELECTROCARDIOGRAM REPORT: CPT | Mod: Z6 | Performed by: FAMILY MEDICINE

## 2019-01-08 PROCEDURE — 72125 CT NECK SPINE W/O DYE: CPT

## 2019-01-08 PROCEDURE — 85379 FIBRIN DEGRADATION QUANT: CPT | Performed by: FAMILY MEDICINE

## 2019-01-08 PROCEDURE — 85025 COMPLETE CBC W/AUTO DIFF WBC: CPT | Performed by: FAMILY MEDICINE

## 2019-01-08 PROCEDURE — 84484 ASSAY OF TROPONIN QUANT: CPT | Performed by: FAMILY MEDICINE

## 2019-01-08 PROCEDURE — 99285 EMERGENCY DEPT VISIT HI MDM: CPT | Mod: 25 | Performed by: FAMILY MEDICINE

## 2019-01-08 PROCEDURE — 70450 CT HEAD/BRAIN W/O DYE: CPT

## 2019-01-08 PROCEDURE — 71046 X-RAY EXAM CHEST 2 VIEWS: CPT

## 2019-01-08 PROCEDURE — 93005 ELECTROCARDIOGRAM TRACING: CPT | Performed by: FAMILY MEDICINE

## 2019-01-08 PROCEDURE — 80053 COMPREHEN METABOLIC PANEL: CPT | Performed by: FAMILY MEDICINE

## 2019-01-08 PROCEDURE — 81001 URINALYSIS AUTO W/SCOPE: CPT | Performed by: FAMILY MEDICINE

## 2019-01-08 RX ORDER — GABAPENTIN 300 MG/1
300 CAPSULE ORAL 3 TIMES DAILY
COMMUNITY
Start: 2018-11-21 | End: 2020-12-15

## 2019-01-08 RX ORDER — LEVOTHYROXINE SODIUM 112 UG/1
112 TABLET ORAL DAILY
COMMUNITY
Start: 2018-12-04 | End: 2019-02-14

## 2019-01-08 RX ORDER — FLUOXETINE 40 MG/1
60 CAPSULE ORAL DAILY
COMMUNITY
Start: 2018-11-21 | End: 2021-06-01

## 2019-01-08 RX ORDER — SODIUM CHLORIDE 9 MG/ML
1000 INJECTION, SOLUTION INTRAVENOUS CONTINUOUS
Status: DISCONTINUED | OUTPATIENT
Start: 2019-01-08 | End: 2019-01-08 | Stop reason: HOSPADM

## 2019-01-08 ASSESSMENT — ENCOUNTER SYMPTOMS
CHILLS: 0
SINUS PRESSURE: 0
PALPITATIONS: 0
DIAPHORESIS: 0
FEVER: 0
NAUSEA: 1
DIZZINESS: 1
VOMITING: 0
DIARRHEA: 1
CONSTIPATION: 0
LIGHT-HEADEDNESS: 1
BLOOD IN STOOL: 1
COUGH: 0
CONFUSION: 1
WHEEZING: 0
DECREASED CONCENTRATION: 1
SORE THROAT: 0
FREQUENCY: 0
DYSURIA: 0
HEADACHES: 1
SHORTNESS OF BREATH: 0
ABDOMINAL PAIN: 0

## 2019-01-08 ASSESSMENT — MIFFLIN-ST. JEOR: SCORE: 1388.08

## 2019-01-08 NOTE — ED AVS SNAPSHOT
[facility]  [address] [address]  Phone:  [phone]  Fax:  [phone]                                    Suzy Rodríguez   MRN: [id number]    Department:  [facility] Department   Date of Visit:  1/8/2019           After Visit Summary Signature Page    I have received my discharge instructions, and my questions have been answered. I have discussed any challenges I see with this plan with the nurse or doctor.    ..........................................................................................................................................  Patient/Patient Representative Signature      ..........................................................................................................................................  Patient Representative Print Name and Relationship to Patient    ..................................................               ................................................  Date                                   Time    ..........................................................................................................................................  Reviewed by Signature/Title    ...................................................              ..............................................  Date                                               Time          22EPIC Rev 08/18

## 2019-01-08 NOTE — PROGRESS NOTES
Brief endocrine note:    Writer contacted by ED provider with question of stress dose steroids for this patient. Briefly, patient has a recent diagnosis of ACC s/p adernalectomy, radiation, and current mitotane treatment. She also reportedly had Cushing's, that on further review of most recent progress by Dr. Beverly, is resolved.     She presented to the ED with several days of weakness, fatigue, vision changes, dizziness, and headache. Per ED staff, she missed some doses of hydrocortisone for a few days last week, and for the past couple days has been doubling her home dose of hydrocortisone. Since 09/2018, she has been on 30mg in AM and 20mg in PM. CXR, CT head, CT neck, UA all unremarkable. She has iron deficiency anemia and had a ferritin of 9 in 12/2018. The ED is continuing their work-up, but patient reportedly does not look acutely ill. She is hemodynamically stable apart from some mild tachycardia and there is no fever. No abdominal pain. She can tolerate oral medications.     It's possible that some of her symptoms could be explained by adrenal insufficiency, so it would be reasonable to continue a higher dose for a couple days. It does not appear that she is in shock and I do not think she needs IV stress dose steroids at this time.     Recommendations:  - Continue double dose of hydrocortisone for another 2 days  - Will message her primary endocrinologist, so that she is aware    Plan of care was discussed with staff, Dr. Italia Pizarro MD  [id number], Endocrinology Fellow

## 2019-01-08 NOTE — ED PROVIDER NOTES
"  History     Chief Complaint   Patient presents with     Fall     passed out just pta and struck left forehead on step while going upstairs-head ache-no blood thinner     HPI  Suzy Rodríguez is a 33 year old female who has a history of MTHFR mutation, Cushing's syndrome ?iatrogenic from high dose hydrocortisone, carcinoma adrenal cortical s/p right adrenalectomy, Luetscher's syndrome, hypertension, and anemia who presents to the ED for evaluation of injuries sustained in a fall. One hour prior to ED arrival, patient fell while walking up the stairs. She notes that she was going to take a second dose of hydrocortisone for adrenal replacement and had a syncopal episode while walking back up her carpeted staircase. She woke up a few moments after syncope. Patient struck her head on the stairs, and here in the ED she reports pain in the head and above the left eye.  No vision changes. was able to ambulate after the fall.     Patient reports that five days ago, on Friday, she noticed that she had missed 3 days of her hydrocortisone.  Patient started taking her hydrocortisone four days ago, on Saturday. She elected to take double her usual dose of hydrocortisone starting yesterday and today. Five days ago she notes that she has been dizzy with vertigo sensation, light headed, and fatigued. For the last five days, she reports that she has also felt \"very drunk\" without having consumed alcohol. She denies syncopal episodes recently other than today. No palpitations. No chest pain or shortness of breath.  For the past few days she reports headache and blurry vision. Her headache is in the frontal forehead and radiates to the crown of the head. She rates her headache as a 2-3/10 and she characterizes this as a throbbing headache.She has no history of migraines.     She denies pregnancy.     She also reports possible horizontal double vision when I ask specifically. This is resolved by closing one eye. She has nausea and " diarrhea. Patient had blood in her stool this morning, this was on top of her stool, on toilet paper. She has no history of hemorrhoids. She is urinating normally.     She last had her thyroid checked last month on 12/3/2018 and her  T4 was 0.7. She was started on levothyroxine. She denies recent illness, fever, cough, sore throat, chest pain, shortness of breath, abdominal pain, vomiting, or urinary symptoms. She had a urinary tract infection last week for which she was on antibiotics. During that time she was not on higher hydrocortisone doses. She completed her antibiotics but she has not had her urine rechecked.  No current UTI symptoms.      Problem List:    Patient Active Problem List    Diagnosis Date Noted     Generalized anxiety disorder 10/18/2018     Priority: Medium     Overview:   Created by Conversion    Overview:   Overview:   Created by Conversion  Overview:   Created by Conversion       Allergic urticaria 10/18/2018     Priority: Medium     Overview:   Created by Conversion       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     Priority: Medium     Nausea with vomiting 09/10/2018     Priority: Medium     Adrenal insufficiency (H) 08/01/2018     Priority: Medium     Neoplastic malignant related fatigue 08/01/2018     Priority: Medium     Other malignant neuroendocrine tumors (H) 07/30/2018     Priority: Medium     Adrenal mass (H)      Priority: Medium     Carcinoma, adrenal cortical (H) 07/18/2018     Priority: Medium     Postoperative anemia due to acute blood loss 06/30/2018     Priority: Medium     Pleural effusion on right 06/30/2018     Priority: Medium     Ascites 06/30/2018     Priority: Medium     Malignant neoplasm of cortex of right adrenal gland (H) 06/30/2018     Priority: Medium     Overview:   Overview:     Adrenal cortical carcinoma, 11.3 cm s/p resection (anterior laporotomy) June 25, 2018    Cushing's syndrome, secondary to #1 (300 mcg/24 hr); Rx hydrocortisone 20 + 10 post op    Post operative  defect right hemidiaphragm with ?worsening right effussion, not present preop    Currently on mitotane, 500 mg, BID x 1 week + hydrocortisone 30 + 20 mg daily    Last Assessment & Plan:     I had a comprehensive discussion with the patient and her  regarding this diagnosis and her overall course.    We discussed the mixed data and mixed approaches to postoperative adjuvant external beam radiotherapy to the adrenal bed and that our approach here is to reserve external beam radiotherapy for proven recurrence in the bed refractory to reoperation and that we do not routinely proceed with radiation therapy postoperatively.    We discussed the role of mitotane in the adjuvant and metastatic setting and we discussed approaches of titrating the dose based on tolerance versus drug levels.  Our approach in Medical Oncology is to titrate the dose to tolerance as it is very difficult for most patients to the reach therapeutic levels.    We discussed the endocrinopathies associated with mitotane which include hyperlipidemia, central hypothyroidism and of course adrenal insufficiency with higher requirements for hydrocortisone replacement therapy    We discussed the pleural effusion, I think this is likely a surgical complication either infectious or inflammatory.  She is afebrile.  I would favor close surveillance with a repeat chest x-ray in a couple weeks and if not improved then consider thoracentesis for diagnostic purposes.  Her preoperative CT chest showed no evidence of plaques or pleural disease or of intra pulmonary disease or effusion which is reassuring    We discussed long-term surveillance and I would suggest reimaging her for recurrence with a PET-CT in 3 months pending further interim analysis of the right effusion.  It is reassuring that the PET-CT and her prior MRI of the abdomen apparently show no evidence of distant disease and likely inflammatory or postsurgical changes within the lung and right  adrenal bed.    We discussed Cushing syndrome and management in the future as well as screening for recurrence with 1 mg dex suppressed cortisol levels.    The patient and her  have an appointment at the [facility] with Dr. casas in a week or so.  They also have an appointment at home for 5-6 weeks of focal radiation therapy to the adrenal bed that they are debating their options.    If they like to follow up with me in the future the let me know by portal or phone call.    Overview:   Overview:   Overview:     Adrenal cortical carcinoma, 11.3 cm s/p resection (anterior laporotomy) June 25, 2018    Cushing's syndrome, secondary to #1 (300 mcg/24 hr); Rx hydrocortisone 20 + 10 post op    Post operative defect right hemidiaphragm with ?worsening right effussion, not present preop    Currently on mitotane, 500 mg, BID x 1 week + hydrocortisone 30 + 20 mg daily    Last Assessment & Plan:     I had a comprehensive discussion with the patient and her  regarding this diagnosis and her overall course.    We discussed the mixed data and mixed approaches to postoperative adjuvant external beam radiotherapy to the adrenal bed and that our approach here is to reserve external beam radiotherapy for proven recurrence in the bed refractory to reoperation and that we do not routinely proceed with radiation therapy postoperatively.    We discussed the role of mitotane in the adjuvant and metastatic setting and we discussed approaches of titrating the dose based on tolerance versus drug levels.  Our approach in Medical Oncology is to titrate the dose to tolerance as it is very difficult for most patients to the reach therapeutic levels.    We discussed the endocrinopathies associated with mitotane which include hyperlipidemia, central hypothyroidism and of course adrenal insufficiency with higher requirements for hydrocortisone replacement therapy    We discussed the pleural effusion, I think this is likely  a surgical complication either infectious or inflammatory.  She is afebrile.  I would favor close surveillance with a repeat chest x-ray in a couple weeks and if not improved then consider thoracentesis for diagnostic purposes.  Her preoperative CT chest showed no evidence of plaques or pleural disease or of intra pulmonary disease or effusion which is reassuring    We discussed long-term surveillance and I would suggest reimaging her for recurrence with a PET-CT in 3 months pending further interim analysis of the right effusion.  It is reassuring that the PET-CT and her prior MRI of the abdomen apparently show no evidence of distant disease and likely inflammatory or postsurgical changes within the lung and right adrenal bed.    We discussed Cushing syndrome and management in the future as well as screening for recurrence with 1 mg dex suppressed cortisol levels.    The patient and her  have an appointment at the [facility] with Dr. casas in a week or so.  They also have an appointment at home for 5-6 weeks of focal radiation therapy to the adrenal bed that they are debating their options.    If they like to follow up with me in the future the let me know by portal or phone call.  Resected 6/25/18, Dr. Mansfield.  Overview:   Overview:     Adrenal cortical carcinoma, 11.3 cm s/p resection (anterior laporotomy) June 25, 2018    Cushing's syndrome, secondary to #1 (300 mcg/24 hr); Rx hydrocortisone 20 + 10 post op    Post operative defect right hemidiaphragm with ?worsening right effussion, not present preop    Currently on mitotane, 500 mg, BID x 1 week + hydrocortisone 30 + 20 mg daily    Last Assessment & Plan:     I had a comprehensive discussion with the patient and her  regarding this diagnosis and her overall course.    We discussed the mixed data and mixed approaches to postoperative adjuvant external beam radiotherapy to the adrenal bed and that our approach here is to reserve  external beam radiotherapy for proven recurrence in the bed refractory to reoperation and that we do not routinely proceed with radiation therapy postoperatively.    We discussed the role of mitotane in the adjuvant and metastatic setting and we discussed approaches of titrating the dose based on tolerance versus drug levels.  Our approach in Medical Oncology is to titrate the dose to tolerance as it is very difficult for most patients to the reach therapeutic levels.    We discussed the endocrinopathies associated with mitotane which include hyperlipidemia, central hypothyroidism and of course adrenal insufficiency with higher requirements for hydrocortisone replacement therapy    We discussed the pleural effusion, I think this is likely a surgical complication either infectious or inflammatory.  She is afebrile.  I would favor close surveillance with a repeat chest x-ray in a couple weeks and if not improved then consider thoracentesis for diagnostic purposes.  Her preoperative CT chest showed no evidence of plaques or pleural disease or of intra pulmonary disease or effusion which is reassuring    We discussed long-term surveillance and I would suggest reimaging her for recurrence with a PET-CT in 3 months pending further interim analysis of the right effusion.  It is reassuring that the PET-CT and her prior MRI of the abdomen apparently show no evidence of distant disease and likely inflammatory or postsurgical changes within the lung and right adrenal bed.    We discussed Cushing syndrome and management in the future as well as screening for recurrence with 1 mg dex suppressed cortisol levels.    The patient and her  have an appointment at the [facility] with Dr. casas in a week or so.  They also have an appointment at home for 5-6 weeks of focal radiation therapy to the adrenal bed that they are debating their options.    If they like to follow up with me in the future the let me know by  portal or phone call.  Resected 18, Dr. Mansfield.       Anemia 2018     Priority: Medium     Chest pain 2018     Priority: Medium     Cushing's syndrome (H) 2018     Priority: Medium     Overview:   Overview:   24 hr urine cortisol ~300 mcg/24 hr  Overview:   24 hr urine cortisol ~300 mcg/24 hr  Overview:   Overview:   24 hr urine cortisol ~300 mcg/24 hr       Hypertension due to endocrine disorder 2018     Priority: Medium     Adrenal mass, right (H) 2018     Priority: Medium     Calculus of kidney 2018     Priority: Medium     Hypervitaminosis A 2017     Priority: Medium     S/P gastric bypass 2016     Priority: Medium     Overview:   6.8.16 with Dr. Randy Sutherland.  Preop weight at intake was 230 lbs.       MTHFR mutation (H) 2016     Priority: Medium     Overview:   Homozygous per patient  Overview:   Homozygous per patient          Past Medical History:    Past Medical History:   Diagnosis Date     Adrenal cortex cancer, right (H) 2018     Adrenal mass (H)      Chocolate cyst of ovary      History of miscarriage      MTHFR mutation (H)        Past Surgical History:    Past Surgical History:   Procedure Laterality Date     ADRENALECTOMY Right 2018    Procedure: ADRENALECTOMY;  Right Adrenalectomy,  Embolize Liver , Anesthesia Block;  Surgeon: Warren Mansfield MD;  Location: [facility] OR     ADRENALECTOMY        SECTION      twice      SECTION      2     EMBOLECTOMY ABDOMEN N/A 2018    Procedure: EMBOLECTOMY ABDOMEN;;  Surgeon: Ector Mcintyre MD;  Location: [facility] OR     EXTRACORPOREAL SHOCK WAVE LITHOTRIPSY (ESWL)       GASTRIC BYPASS         Family History:    No family history on file.    Social History:  Marital Status:   [2]  Social History     Tobacco Use     Smoking status: Never Smoker     Smokeless tobacco: Never Used   Substance Use Topics     Alcohol use: Yes     Comment: occ.     Drug use: No     "    Medications:      acetaminophen (TYLENOL) 325 MG tablet   buPROPion (WELLBUTRIN) 100 MG tablet   calcium carbonate antacid 1000 MG CHEW   cholecalciferol (VITAMIN D-1000 MAX ST) 1000 units TABS   enoxaparin (LOVENOX) 40 MG/0.4ML injection   EPINEPHrine (EPIPEN/ADRENACLICK/OR ANY BX GENERIC EQUIV) 0.3 MG/0.3ML injection 2-pack   ferrous sulfate (IRON) 325 (65 Fe) MG tablet   FLUoxetine (PROZAC) 20 MG capsule   gabapentin (NEURONTIN) 100 MG capsule   hydrocortisone (CORTEF) 10 MG tablet   hydrocortisone sodium succinate PF (SOLU-CORTEF) 100 MG injection   Lidocaine (LIDOCARE) 4 % Patch   LORazepam (ATIVAN) 0.5 MG tablet   LORazepam (ATIVAN) 1 MG tablet   mitotane (LYSODREN) 500 MG tablet CHEMO   Multiple Vitamins-Calcium (ONE-A-DAY WOMENS PO)   ondansetron (ZOFRAN) 8 MG tablet   oxyCODONE IR (ROXICODONE) 10 MG tablet   oxyCODONE-acetaminophen (PERCOCET) 5-325 MG per tablet   pantoprazole (PROTONIX) 40 MG EC tablet   prochlorperazine (COMPAZINE) 10 MG tablet   triamcinolone (KENALOG) 0.1 % lotion         Review of Systems   Constitutional: Negative for chills, diaphoresis and fever.   HENT: Negative for ear pain, sinus pressure and sore throat.    Eyes: Positive for visual disturbance.   Respiratory: Negative for cough, shortness of breath and wheezing.    Cardiovascular: Negative for chest pain and palpitations.   Gastrointestinal: Positive for blood in stool, diarrhea and nausea. Negative for abdominal pain, constipation and vomiting.   Genitourinary: Negative for dysuria, frequency and urgency.   Skin: Negative for rash.   Neurological: Positive for dizziness, syncope, light-headedness and headaches.   Psychiatric/Behavioral: Positive for confusion and decreased concentration.   All other systems reviewed and are negative.      Physical Exam   BP: 116/83  Heart Rate: 107  Temp: 98.4  F (36.9  C)  Resp: 14  Height: 162.6 cm (5' 4\")  Weight: 70.3 kg (155 lb)  SpO2: 100 %      Physical Exam   Constitutional: She " appears distressed.   HENT:   Mouth/Throat: Oropharynx is clear and moist.   Eyes: Conjunctivae are normal. Right conjunctiva is not injected. Left conjunctiva is not injected. Right eye exhibits nystagmus (at endpoints only - a few beats). Left eye exhibits nystagmus ( at endpoints only - a few beats).   Neck: Neck supple. Spinous process tenderness (C2 region) present.   Cardiovascular: Normal rate and regular rhythm. Exam reveals no friction rub.   No murmur heard.  Pulmonary/Chest: Effort normal and breath sounds normal. No stridor. No respiratory distress. She has no wheezes. She has no rales.   Abdominal: Soft. Bowel sounds are normal. She exhibits no distension and no mass. There is no tenderness. There is no guarding.   Musculoskeletal: She exhibits no edema.   Neurological: She has normal strength. No cranial nerve deficit or sensory deficit. She exhibits normal muscle tone. Coordination normal.   Skin: No rash noted. She is not diaphoretic. No pallor.     EYES: Mild ptosis present bilaterally. Slight erythema bilateral upper eyelids and surrounding lids with slight lid puffiness. Minimal bilateral nystagmus; slightly more looking to the left than looking to the right side.         ED Course        Procedures                 EKG Interpretation:      Interpreted by Nestor Ayala MD  EKG done at 1612 demonstrates a sinus rhythm at 89 bpm with a normal axis at no ST change.  T wave flattening in leads V3, V4, V5.  Normal R progression.  No Q waves.  Normal intervals.  Normal conduction.  No ectopy.  Impression sinus rhythm 89 bpm and no significant change from EKG done in 6/29/2018.      Critical Care time:  none               Results for orders placed or performed during the hospital encounter of 01/08/19 (from the past 24 hour(s))   UA with Microscopic   Result Value Ref Range    Color Urine Yellow     Appearance Urine Slightly Cloudy     Glucose Urine 50 (A) NEG^Negative mg/dL    Bilirubin Urine Negative  NEG^Negative    Ketones Urine Negative NEG^Negative mg/dL    Specific Gravity Urine 1.012 1.003 - 1.035    Blood Urine Negative NEG^Negative    pH Urine 5.0 5.0 - 7.0 pH    Protein Albumin Urine Negative NEG^Negative mg/dL    Urobilinogen mg/dL 0.0 0.0 - 2.0 mg/dL    Nitrite Urine Negative NEG^Negative    Leukocyte Esterase Urine Negative NEG^Negative    Source Midstream Urine     WBC Urine 1 0 - 5 /HPF    RBC Urine <1 0 - 2 /HPF    Bacteria Urine Few (A) NEG^Negative /HPF    Squamous Epithelial /HPF Urine 2 (H) 0 - 1 /HPF    Mucous Urine Present (A) NEG^Negative /LPF   XR Chest 2 Views    Narrative    CHEST TWO VIEWS  1/8/2019 3:58 PM     HISTORY:  Syncope.    COMPARISON: 8/7/2018.      Impression    IMPRESSION: Negative chest. Lungs clear.    RAHEEL ESPINOSA MD   CT Head w/o Contrast    Narrative    CT OF THE HEAD WITHOUT CONTRAST 1/8/2019 3:59 PM     COMPARISON: Brain MR 7/22/2018    HISTORY:  See the clinical information for interpreting provider.  Fall, head injury, blurred vision.    TECHNIQUE: Axial CT images of the head from the skull base to the  vertex were acquired without IV contrast.    FINDINGS:  The ventricles and basal cisterns are within normal limits  in configuration. There is no midline shift. There are no extra-axial  fluid collections.  Gray-white differentiation is well maintained.     No intracranial hemorrhage, mass or recent infarct.    The visualized paranasal sinuses are well aerated. There is no  mastoiditis. There are no fractures of the visualized bones.       Impression    IMPRESSION:  Normal head CT.     Radiation dose for this scan was reduced using automated exposure  control, adjustment of the mA and/or kV according to patient size, or  iterative reconstruction technique.    ARCHANA WHITT MD   Cervical spine CT w/o contrast    Narrative    CT OF THE CERVICAL SPINE WITHOUT CONTRAST   1/8/2019 3:59 PM     COMPARISON: None.    HISTORY: C-spine trauma, high clinical risk  (NEXUS/CCR). Fall, midline  tenderness at C2.     TECHNIQUE: Axial images of the cervical spine were acquired without  intravenous contrast. Multiplanar reformations were created.      FINDINGS: There is normal alignment of the cervical vertebrae;  however, there is straightening of normal cervical lordosis. Vertebral  body heights of the cervical spine are normal. Craniocervical  alignment is normal. There are no fractures of the cervical spine.  There is no spinal canal stenosis of the cervical spine. There is no  prevertebral soft tissue swelling.      Impression    IMPRESSION: No evidence for fracture or traumatic malalignment of the  cervical spine.      Radiation dose for this scan was reduced using automated exposure  control, adjustment of the mA and/or kV according to patient size, or  iterative reconstruction technique    ARCHANA WHITT MD   CBC with platelets differential   Result Value Ref Range    WBC 9.2 4.0 - 11.0 10e9/L    RBC Count 4.19 3.8 - 5.2 10e12/L    Hemoglobin 9.2 (L) 11.7 - 15.7 g/dL    Hematocrit 31.4 (L) 35.0 - 47.0 %    MCV 75 (L) 78 - 100 fl    MCH 22.0 (L) 26.5 - 33.0 pg    MCHC 29.3 (L) 31.5 - 36.5 g/dL    RDW 17.6 (H) 10.0 - 15.0 %    Platelet Count 236 150 - 450 10e9/L    Diff Method Automated Method     % Neutrophils 89.9 %    % Lymphocytes 2.3 %    % Monocytes 7.3 %    % Eosinophils 0.0 %    % Basophils 0.3 %    % Immature Granulocytes 0.2 %    Nucleated RBCs 0 0 /100    Absolute Neutrophil 8.3 1.6 - 8.3 10e9/L    Absolute Lymphocytes 0.2 (L) 0.8 - 5.3 10e9/L    Absolute Monocytes 0.7 0.0 - 1.3 10e9/L    Absolute Eosinophils 0.0 0.0 - 0.7 10e9/L    Absolute Basophils 0.0 0.0 - 0.2 10e9/L    Abs Immature Granulocytes 0.0 0 - 0.4 10e9/L    Absolute Nucleated RBC 0.0    Comprehensive metabolic panel   Result Value Ref Range    Sodium 138 133 - 144 mmol/L    Potassium 4.2 3.4 - 5.3 mmol/L    Chloride 106 94 - 109 mmol/L    Carbon Dioxide 25 20 - 32 mmol/L    Anion Gap 7 3 - 14 mmol/L     Glucose 88 70 - 99 mg/dL    Urea Nitrogen 10 7 - 30 mg/dL    Creatinine 0.59 0.52 - 1.04 mg/dL    GFR Estimate >90 >60 mL/min/[1.73_m2]    GFR Estimate If Black >90 >60 mL/min/[1.73_m2]    Calcium 7.3 (L) 8.5 - 10.1 mg/dL    Bilirubin Total 0.3 0.2 - 1.3 mg/dL    Albumin 3.0 (L) 3.4 - 5.0 g/dL    Protein Total 5.7 (L) 6.8 - 8.8 g/dL    Alkaline Phosphatase 161 (H) 40 - 150 U/L    ALT 49 0 - 50 U/L    AST 51 (H) 0 - 45 U/L   D dimer quantitative   Result Value Ref Range    D Dimer 0.3 0.0 - 0.50 ug/ml FEU   Troponin I   Result Value Ref Range    Troponin I ES <0.015 0.000 - 0.045 ug/L       Medications - No data to display    3:35 PM Patient assessed.    4:47 PM Spoke with Dr. Pizarro in endocrinology.     Assessments & Plan (with Medical Decision Making)     MDM: Suzy Rodríguez is a 33 year old female who presented with a history of adrenal carcinoma and status post resection of the adrenal gland and since then on hydrocortisone replacement at high dose which she missed 3 days until last Saturday when she resumed the dosing.  Since missing the dosing she has had some atypical symptoms.  Some of these are expected such as sense of fatigue and feeling off.  But other such as a sense of diplopia and headache could not necessarily be explained by the missed doses.  She  also had a UTI diagnosed last week and she is status post treatment of this.  She was not on higher dose of hydrocortisone during that infection.  Then today she had a syncopal episode at home without preceding presyncopal symptoms.  She did have a headache and C2 region neck tenderness but no change in range of motion following the fall at home.  This was an unwitnessed fall and she was able to ambulate afterwards.  Her evaluation in the emergency department included a syncopal evaluation with EKG, troponin, electrolytes blood counts and chest x-ray. She does have a history of hypercoagulable state and her d-dimer was normal.     Related to the  headache and fall and C2 tenderness she underwent CT head CT cervical spine.  I spoke with her endocrinologist group, on call was Dr. Pizarro we reviewed her recent history as well as medications she is on he did recommend 2 days of the hydrocortisone at higher dose and then resuming regular dosing.    Diplopia had only been noted after I asked her specifically about this and is not a single concern but was more a combination of multiple symptoms ongoing today.  I see no findings of extraocular movement deficits on examination.  She has normal neurologic exam and able to ambulate and has normal coordination.  There are a few beats of nystagmus at the endpoints but no significant findings.  At this point I did not recommend CT angiogram or MRI of the brain although this may be considered.  I do not believe these are CVA related symptoms.    We discussed continuing on the hydrocortisone maintaining hydration and follow-up with her oncologist as scheduled for tomorrow.  May also consider primary care follow-up.  Additional imaging may be needed again although at this time I do not recommend it.  I have reviewed the nursing notes.    I have reviewed the findings, diagnosis, plan and need for follow up with the patient.          Medication List      There are no discharge medications for this visit.         Final diagnoses:   Adrenal insufficiency (H) - I suspec tsymptoms today were related to the missed doses. Spoke with endo N and they recommend double hydrocortisone dose for 2 days and then return to prior dosing. follow-up oncology tomorrow as scheduled   MTHFR mutation (H)   S/P gastric bypass   Syncope, unspecified syncope type - reassuring evaluation. no serious findings.  stay hydrated. return for fever, worsening.     This document serves as a record of the services and decisions personally performed and made by Nestor Ayala MD. It was created on HIS/HER behalf by   Candice Campbell, a trained medical  scribe. The creation of this document is based the provider's statements to the medical scribe.  Candice Shannan 3:35 PM 1/8/2019    Provider:   The information in this document, created by the medical scribe for me, accurately reflects the services I personally performed and the decisions made by me. I have reviewed and approved this document for accuracy prior to leaving the patient care area.  Nestor Ayala MD 3:35 PM 1/8/2019 1/8/2019   [facility] EMERGENCY DEPARTMENT     Nestor Ayala MD  01/09/19 1103

## 2019-01-09 ENCOUNTER — ONCOLOGY VISIT (OUTPATIENT)
Dept: ONCOLOGY | Facility: CLINIC | Age: 34
End: 2019-01-09
Attending: INTERNAL MEDICINE
Payer: COMMERCIAL

## 2019-01-09 ENCOUNTER — RECORDS - HEALTHEAST (OUTPATIENT)
Dept: ADMINISTRATIVE | Facility: OTHER | Age: 34
End: 2019-01-09

## 2019-01-09 ENCOUNTER — APPOINTMENT (OUTPATIENT)
Dept: LAB | Facility: CLINIC | Age: 34
End: 2019-01-09
Attending: INTERNAL MEDICINE
Payer: COMMERCIAL

## 2019-01-09 VITALS
BODY MASS INDEX: 27.19 KG/M2 | TEMPERATURE: 99 F | HEART RATE: 103 BPM | OXYGEN SATURATION: 100 % | WEIGHT: 158.4 LBS | SYSTOLIC BLOOD PRESSURE: 123 MMHG | RESPIRATION RATE: 16 BRPM | DIASTOLIC BLOOD PRESSURE: 75 MMHG

## 2019-01-09 DIAGNOSIS — F19.982 DRUG-INDUCED INSOMNIA (H): Primary | ICD-10-CM

## 2019-01-09 DIAGNOSIS — C74.01 ADRENAL CORTEX CANCER, RIGHT (H): ICD-10-CM

## 2019-01-09 LAB
ALBUMIN SERPL-MCNC: 3 G/DL (ref 3.4–5)
ALP SERPL-CCNC: 182 U/L (ref 40–150)
ALT SERPL W P-5'-P-CCNC: 55 U/L (ref 0–50)
ANION GAP SERPL CALCULATED.3IONS-SCNC: 7 MMOL/L (ref 3–14)
AST SERPL W P-5'-P-CCNC: 57 U/L (ref 0–45)
BASOPHILS # BLD AUTO: 0 10E9/L (ref 0–0.2)
BASOPHILS NFR BLD AUTO: 0.7 %
BILIRUB SERPL-MCNC: 0.3 MG/DL (ref 0.2–1.3)
BUN SERPL-MCNC: 7 MG/DL (ref 7–30)
CALCIUM SERPL-MCNC: 8.1 MG/DL (ref 8.5–10.1)
CHLORIDE SERPL-SCNC: 102 MMOL/L (ref 94–109)
CO2 SERPL-SCNC: 27 MMOL/L (ref 20–32)
CREAT SERPL-MCNC: 0.6 MG/DL (ref 0.52–1.04)
DHEA-S SERPL-MCNC: <15 UG/DL (ref 35–430)
DIFFERENTIAL METHOD BLD: ABNORMAL
EOSINOPHIL # BLD AUTO: 0 10E9/L (ref 0–0.7)
EOSINOPHIL NFR BLD AUTO: 0.2 %
ERYTHROCYTE [DISTWIDTH] IN BLOOD BY AUTOMATED COUNT: 18.1 % (ref 10–15)
GFR SERPL CREATININE-BSD FRML MDRD: >90 ML/MIN/{1.73_M2}
GLUCOSE SERPL-MCNC: 84 MG/DL (ref 70–99)
HCT VFR BLD AUTO: 34.9 % (ref 35–47)
HGB BLD-MCNC: 9.7 G/DL (ref 11.7–15.7)
IMM GRANULOCYTES # BLD: 0 10E9/L (ref 0–0.4)
IMM GRANULOCYTES NFR BLD: 0.5 %
LYMPHOCYTES # BLD AUTO: 0.2 10E9/L (ref 0.8–5.3)
LYMPHOCYTES NFR BLD AUTO: 4.8 %
MCH RBC QN AUTO: 20.7 PG (ref 26.5–33)
MCHC RBC AUTO-ENTMCNC: 27.8 G/DL (ref 31.5–36.5)
MCV RBC AUTO: 75 FL (ref 78–100)
MONOCYTES # BLD AUTO: 0.3 10E9/L (ref 0–1.3)
MONOCYTES NFR BLD AUTO: 7.7 %
NEUTROPHILS # BLD AUTO: 3.8 10E9/L (ref 1.6–8.3)
NEUTROPHILS NFR BLD AUTO: 86.1 %
NRBC # BLD AUTO: 0 10*3/UL
NRBC BLD AUTO-RTO: 0 /100
PLATELET # BLD AUTO: 229 10E9/L (ref 150–450)
POTASSIUM SERPL-SCNC: 3.5 MMOL/L (ref 3.4–5.3)
PROT SERPL-MCNC: 6.2 G/DL (ref 6.8–8.8)
RBC # BLD AUTO: 4.68 10E12/L (ref 3.8–5.2)
SODIUM SERPL-SCNC: 136 MMOL/L (ref 133–144)
WBC # BLD AUTO: 4.4 10E9/L (ref 4–11)

## 2019-01-09 PROCEDURE — G0463 HOSPITAL OUTPT CLINIC VISIT: HCPCS

## 2019-01-09 PROCEDURE — 80375 DRUG/SUBSTANCE NOS 1-3: CPT | Performed by: INTERNAL MEDICINE

## 2019-01-09 PROCEDURE — 84999 UNLISTED CHEMISTRY PROCEDURE: CPT | Performed by: INTERNAL MEDICINE

## 2019-01-09 PROCEDURE — 36415 COLL VENOUS BLD VENIPUNCTURE: CPT

## 2019-01-09 PROCEDURE — 85025 COMPLETE CBC W/AUTO DIFF WBC: CPT | Performed by: INTERNAL MEDICINE

## 2019-01-09 PROCEDURE — 80053 COMPREHEN METABOLIC PANEL: CPT | Performed by: INTERNAL MEDICINE

## 2019-01-09 PROCEDURE — 99215 OFFICE O/P EST HI 40 MIN: CPT | Mod: ZP | Performed by: INTERNAL MEDICINE

## 2019-01-09 PROCEDURE — 82627 DEHYDROEPIANDROSTERONE: CPT | Performed by: INTERNAL MEDICINE

## 2019-01-09 RX ORDER — EPINEPHRINE 1 MG/ML
0.3 INJECTION, SOLUTION INTRAMUSCULAR; SUBCUTANEOUS EVERY 5 MIN PRN
Status: CANCELLED | OUTPATIENT
Start: 2019-01-29

## 2019-01-09 RX ORDER — ALBUTEROL SULFATE 0.83 MG/ML
2.5 SOLUTION RESPIRATORY (INHALATION)
Status: CANCELLED | OUTPATIENT
Start: 2019-01-29

## 2019-01-09 RX ORDER — METHYLPREDNISOLONE SODIUM SUCCINATE 125 MG/2ML
125 INJECTION, POWDER, LYOPHILIZED, FOR SOLUTION INTRAMUSCULAR; INTRAVENOUS
Status: CANCELLED
Start: 2019-01-29

## 2019-01-09 RX ORDER — SODIUM CHLORIDE 9 MG/ML
1000 INJECTION, SOLUTION INTRAVENOUS CONTINUOUS PRN
Status: CANCELLED
Start: 2019-01-29

## 2019-01-09 RX ORDER — ALBUTEROL SULFATE 90 UG/1
1-2 AEROSOL, METERED RESPIRATORY (INHALATION)
Status: CANCELLED
Start: 2019-01-29

## 2019-01-09 RX ORDER — MEPERIDINE HYDROCHLORIDE 25 MG/ML
25 INJECTION INTRAMUSCULAR; INTRAVENOUS; SUBCUTANEOUS EVERY 30 MIN PRN
Status: CANCELLED | OUTPATIENT
Start: 2019-01-29

## 2019-01-09 RX ORDER — EPINEPHRINE 0.3 MG/.3ML
0.3 INJECTION SUBCUTANEOUS EVERY 5 MIN PRN
Status: CANCELLED | OUTPATIENT
Start: 2019-01-29

## 2019-01-09 RX ORDER — DIPHENHYDRAMINE HYDROCHLORIDE 50 MG/ML
50 INJECTION INTRAMUSCULAR; INTRAVENOUS
Status: CANCELLED
Start: 2019-01-29

## 2019-01-09 ASSESSMENT — PAIN SCALES - GENERAL: PAINLEVEL: MILD PAIN (3)

## 2019-01-09 NOTE — DISCHARGE INSTRUCTIONS
ICD-10-CM    1. Adrenal insufficiency (H) E27.40     I suspec tsymptoms today were related to the missed doses. Spoke with endo GODFREYN and they recommend double hydrocortisone dose for 2 days and then return to prior dosing. follow-up oncology tomorrow as scheduled   2. MTHFR mutation (H) E72.12    3. S/P gastric bypass Z98.84    4. Syncope, unspecified syncope type R55     reassuring evaluation. no serious findings.  stay hydrated. return for fever, worsening.

## 2019-01-09 NOTE — NURSING NOTE
"Oncology Rooming Note    January 9, 2019 10:30 AM   Suzy Rodríguez is a 33 year old female who presents for:    Chief Complaint   Patient presents with     Blood Draw     labs drawn with vpt by rn.  vs taken     Oncology Clinic Visit     Adrenel Ca , labs      Initial Vitals: /75 (BP Location: Right arm, Patient Position: Sitting, Cuff Size: Adult Regular)   Pulse 103   Temp 99  F (37.2  C) (Oral)   Resp 16   Wt 71.8 kg (158 lb 6.4 oz)   SpO2 100%   BMI 27.19 kg/m   Estimated body mass index is 27.19 kg/m  as calculated from the following:    Height as of 1/8/19: 1.626 m (5' 4\").    Weight as of this encounter: 71.8 kg (158 lb 6.4 oz). Body surface area is 1.8 meters squared.  Mild Pain (3) Comment: Data Unavailable   No LMP recorded.  Allergies reviewed: Yes  Medications reviewed: Yes    Medications: Medication refills not needed today.  Pharmacy name entered into [facility]:    [facility] #[id number] - [address] [place]ARM[place], [place] - [address] [facility] [address] [place] [address] [place]ARMACY [place], [place] - [address] [facility] - [place], [place] - [address] [place] [address]  [facility] [address] [place] [facility] [address] [place] [address]    Clinical concerns: Refills    [facility] was notified.    6  minutes for nursing intake (face to face time)     Ching Pederson MA              "

## 2019-01-09 NOTE — LETTER
1/9/2019       RE: Suzy Rodríguez  [address]  [facility] [address]     Dear Colleague,    Thank you for referring your patient, Suzy Rodríguez, to the [facility]CER [facility]. Please see a copy of my visit note below.    MEDICAL ONCOLOGY CLINIC NOTE    REFERRING PROVIDER: Warren Mansfield MD from [facility] Urologic Oncology clinic.     REASON FOR CURRENT VISIT: Follow-up while on mitotane as adjuvant therapy of adrenocortical carcinoma.      HISTORY OF PRESENT ILLNESS: Ms. 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 Her oncologic history is detailed as under.     Suzy is looking worse overall. She has significant nausea with each dose of mitotane. She's taking 2000mg TID of mitotane with Compazine in AM and PM and Ativan at HS.     Two weeks ago, she had a UTI and forgot to double her replacement steroid dose. On Saturday, she forgot to fill the replacement steroid in her pill box and remained off all replacement for 3 days. Started feeling foggy and dizzy and ultimately had a fall y'day where she hit her head. She was evaluated in the [facility] ER on 1/8/18 and a CT head and C-spine without contrast were negative for acute issues or mets.  reports that she had a syncopal episode on Sunday as well as a pre-syncope on Fri/Sat last week.     She's still waiting for an appt with Dr. Hilton. After the last mitotane level of 6.2 in Dec 2018, she did reach out to his team over the phone and was recommended to keep increasing mitotane (was on 2g TID then) as tolerated. No clinical evidence of disease recurrence.     ONCOLOGIC HISTORY:   1. 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   - CT abdomen and pelvis  "stone protocol without contrast 5 5345 showed \"9.2 x 8 cm heterogeneous right upper quadrant mass with small foci of calcification within it which is likely arising from the adrenal gland though inseparable from liver and upper pole of right kidney. It is incompletely evaluated on this noncontrast study. 3 x 2.6 cm mass right lobe of liver on image #85 also incompletely evaluated. 6 x 4 x 4 mm upper third left ureteral obstructing stone with mild to moderate secondary hydronephrosis. Collection of stones at lower pole left kidney extending over an area of approximately 6 x 7 x 4 mm. Additional nonobstructing 1 mm stone upper pole left kidney. 2 mm nonobstructing stone noted upper pole right kidney with no hydronephrosis on the right. Postop change consistent with gastric bypass. Noncontrast images of spleen, left adrenal gland, gallbladder, and pancreas unremarkable. No aneurysm. No adenopathy.\"  - Seen by Dr. Delvalle at [facility] Urology clinic. Referred to Dr. Alvino Patel and then Dr. Warren Mansfield.  - Endocrinology team directed workup showed high DHEAS level of 740 pre-surgery.   - Right open adrenalectomy and hepatic mobilization performed by Dr. Warren Mansfield on 6/25/2018. Pathology showed adrenocortical carcinoma measuring 11.3 cm, weighing 413 g with extension into or through the adrenal capsule negative lymphovascular invasion, tumor necrosis present, margins involved by tumor, no regional lymph nodes identified, pathologic stage T2 NX.  - DHEAS normalized postsurgery.   - Adjuvant Mitotane started on 7/24/18. Dose increased to 2000mg TID around 10/23/18 due to persistently low troughs.  - Saw radiation oncology at [facility], radiation oncology at [facility], as well as endocrine oncology (Dr. Huertas) at [facility].   -S/p adjuvant RT by Dr. Monsivais - 5040 cGy over 30 fr (8/24/18-10/6/18).    REVIEW OF SYSTEMS: 14 point ROS negative other than the symptoms " noted above in the HPI.    PAST MEDICAL AND SURGICAL HISTORY: Reviewed today  1. Right-sided adrenocortical carcinoma.  2. MHTFR mutation with h.o mutiple miscarriages.  3. Ovarian cysts diagnosed in 2011.  4. H/o gastric bypass in 2016 for obesity.    SOCIAL HISTORY:   Social History     Tobacco Use     Smoking status: Never Smoker     Smokeless tobacco: Never Used   Substance Use Topics     Alcohol use: Yes     Comment: occ.     Drug use: No     FAMILY HISTORY:   Reviewed and non-contributory to current admission.      ALLERGIES:   Allergies   Allergen Reactions     Bee Venom Swelling     Ibuprofen Hives and Swelling     CURRENT MEDICATIONS:   Current Outpatient Medications:      acetaminophen (TYLENOL) 325 MG tablet, Take 2 tablets (650 mg) by mouth every 4 hours as needed for other (multimodal surgical pain management along with NSAIDS and opioid medication as indicated based on pain control and physical function.), Disp: 150 tablet, Rfl: 0     buPROPion (WELLBUTRIN) 100 MG tablet, TAKE 1 TABLET (100 MG TOTAL) BY MOUTH 2 (TWO) TIMES A DAY., Disp: , Rfl: 3     calcium carbonate antacid 1000 MG CHEW, Take 1 tablet by mouth every morning , Disp: , Rfl:      cholecalciferol (VITAMIN D-1000 MAX ST) 1000 units TABS, Take 2,000 Units by mouth every morning , Disp: , Rfl:      DRONABINOL PO, by Other route every other day Vape, Disp: , Rfl:      EPINEPHrine (EPIPEN/ADRENACLICK/OR ANY BX GENERIC EQUIV) 0.3 MG/0.3ML injection 2-pack, As directed for bee stings, Disp: , Rfl:      ferrous sulfate (IRON) 325 (65 Fe) MG tablet, Take 325 mg by mouth daily, Disp: , Rfl:      FLUoxetine (PROZAC) 40 MG capsule, Take 40 mg by mouth daily, Disp: , Rfl:      gabapentin (NEURONTIN) 300 MG capsule, Take 300 mg by mouth 3 times daily, Disp: , Rfl:      hydrocortisone (CORTEF) 10 MG tablet, Take 3 pills (=30 mg) in AM and 2 pills (=20 mg) at 2 pm daily, additional as directed., Disp: 180 tablet, Rfl: 11     levothyroxine  (SYNTHROID/LEVOTHROID) 112 MCG tablet, Take 112 mcg by mouth daily, Disp: , Rfl:      melatonin 3 MG CAPS, Take 1 capsule by mouth At Bedtime, Disp: 30 capsule, Rfl: 11     mitotane (LYSODREN) 500 MG tablet CHEMO, Take 3 tablets (1,500 mg) by mouth 4 times daily (Patient taking differently: Take 2,000 mg by mouth 3 times daily ), Disp: 360 tablet, Rfl: 0     Multiple Vitamins-Calcium (ONE-A-DAY WOMENS PO), Take 2 tablets by mouth every morning , Disp: , Rfl:      pantoprazole (PROTONIX) 40 MG EC tablet, Take 1 tablet (40 mg) by mouth daily (Patient taking differently: Take 40 mg by mouth daily as needed ), Disp: 30 tablet, Rfl: 0     prochlorperazine (COMPAZINE) 10 MG tablet, Take 1 tablet (10 mg) by mouth every 6 hours as needed for nausea or vomiting, Disp: 90 tablet, Rfl: 1     triamcinolone (KENALOG) 0.1 % lotion, Apply topically 3 times daily To rash on chest and shoudler, Disp: 60 mL, Rfl: 1     hydrocortisone sodium succinate PF (SOLU-CORTEF) 100 MG injection, Inject 2 mLs (100 mg) into the muscle once for 1 dose Use in emergency situations as discussed in clinic., Disp: 2 mL, Rfl: 1    PHYSICAL EXAMINATION:  Vital signs: /75 (BP Location: Right arm, Patient Position: Sitting, Cuff Size: Adult Regular)   Pulse 103   Temp 99  F (37.2  C) (Oral)   Resp 16   Wt 71.8 kg (158 lb 6.4 oz)   SpO2 100%   BMI 27.19 kg/m     ECOG performance status of 2.   GENERAL: Young lady in chair, in NAD  HEENT: No icterus, no pallor. MMM, PERRL, OP clear. Left periorbital ecchymosis present.   NECK: Supple, no JVD/LAD.  LUNGS: CTAB, normal WOB on RA   CARDIOVASCULAR: Regular rate and rhythm, no murmurs, gallops or rubs, normal S1/S2.   ABDOMEN: Well-healing abdominal incision without fluctuance or exudate. Soft, NT, ND, no masses appreciated, normal BS.   EXTREMITIES: No cyanosis, no clubbing, no edema.   NEUROLOGIC: No focal deficits, CN 2-12 intact. Linear thought process.    LYMPH NODE EXAM: No palpable adenopathy -  cervical, supraclavicular.      LABORATORY DATA:  Lab Test 01/09/19  1018 01/08/19  1617 11/27/18  1349   WBC 4.4 9.2 4.7   RBC 4.68 4.19 4.17   HGB 9.7* 9.2* 8.5*   HCT 34.9* 31.4* 30.1*   MCV 75* 75* 72*   MCH 20.7* 22.0* 20.4*   MCHC 27.8* 29.3* 28.2*   RDW 18.1* 17.6* 17.7*    236 264   NEUTROPHIL 86.1 89.9 79.9     Lab Test 01/09/19  1018 01/08/19  1617 12/03/18 11/27/18  1349    138 141 140   POTASSIUM 3.5 4.2 4.7 4.4   CHLORIDE 102 106  --  108   CO2 27 25  --  27   ANIONGAP 7 7  --  5   GLC 84 88  --  82   BUN 7 10  --  7   CR 0.60 0.59  --  0.61   SHASHA 8.1* 7.3* 8.6 7.9*     Lab Test 01/09/19  1018 01/08/19  1617 12/03/18 11/27/18  1349   PROTTOTAL 6.2* 5.7*  --  6.3*   ALBUMIN 3.0* 3.0*  --  3.2*   BILITOTAL 0.3 0.3  --  0.2   ALKPHOS 182* 161* 167* 157*   AST 57* 51* 34 28   ALT 55* 49  --  24     DHEAS was 740 on 6/5/18, and has been <15 on 7/12/18, 8/20/18, 9/19/18, 10/23/18, 11/27/18, 1/9/19.  Mitotane level (target 14-20): 1.8ng/dl on 8/20/18, 3.5 on 10/25/18, 6.2 on 12/4/18, and 8.1 on 1/9/19.    IMAGING STUDIES:  CT CAP w contrast  11/23/18  1.  Interval resolution of previously seen right hemithorax abnormalities and near-complete resolution of postop change in the right abdomen. No convincing metastatic or recurrent neoplasm.  2. Multicystic abnormality is present in the left ovary and shows progression from the prior study. This can simply remarkably two adjacent cysts with an adjacent dominant follicle. It is not particularly complex to support other pathology which would be considered significantly less likely. Follow-up pelvic ultrasound beyond 2-3 months is recommended in reassessment.    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     Adrenocortical ca:  - Started on adjuvant mitotane in July 2018 based on her presentation and tumor characteristics including invasion of the adrenal  capsule, high mitotic rate, possibly positive margins, and high Ki67, which could result in a rate of recurrence as high as 40%.    - Restaging CT results from 11/23/18 that show no evidence of disease recurrence. No evidence of clinical or biochemical recurrence either.   - Patient tolerating adjuvant mitotane ok overall and will continue 2000mg TID for now until repeat levels come back.  - Mitotane level ([facility] [id number] continues to be subtherapeutic ~6 months since starting therapy. Goal Mitotane levels 14-20. Patient has developed an increasing burden of toxicities with mitotane therapy and I explained to the patient and  that I do not feel comfortable increasing the dose any further.  - LFT abnormality is mild and potentially associated with mitotane use.  - Plan to continue adjuvant Mitotane for up to 5 yrs if tolerated.  - Continue Compazine PRN for nausea.   - Still awaiting an appt with Dr. Huertas. I'll send him this note for review.   - Restaging CT ordered for Feb 2019.    Concern for ativan use disorder:  - Suzy has been dealing with tremendous stress over the past few months and was using Ativan HS almost daily for anxiety and nausea.   - I counseled her of the addictive potential of this drug and she did agree that she has felt increasingly desiring this medication.   - Will stop Ativan and encourage other medical providers to not give any further refills.  - Discussed health coping strategies including cognitive behavioral interventions. Will continue to provide support.     Endocrinopathies:  - Mgmt per endocrine team at the .     Iron deficiency anemia:  - MCV and iron panel suggests iron deficiency, she has heavy periods and no other obvious sources of blood loss.  - Pt on FeSO4 every day and Hb is improving. Monitor.   - She is planning to get a progesterone-only or inert IUD to reduce menstrual blood loss soon. Advised on the increased risk of DVTs with estrogen containing  preparations due to MHTFR mutation with prior history of miscarriages.     Return to see me in 6-8 weeks with labs. Patient and family given opportunity to ask questions, which were answered to their satisfaction. They're in complete agreement as planned.    BILLIN.    Benson Cintron M.D.  . Professor of Medicine  Genitourinary Oncology  Division of Hematology, Oncology & Transplantation  [facility]    CC: Edwin Huertas MD   [facility]

## 2019-01-09 NOTE — NURSING NOTE
Chief Complaint   Patient presents with     Blood Draw     labs drawn with vpt by rn.  vs taken     Labs drawn with vpt by rn.  Pt tolerated well.  VS taken.  Pt checked in for next appt.    Elina Bonilla RN

## 2019-01-10 LAB — MISCELLANEOUS TEST: NORMAL

## 2019-01-11 ENCOUNTER — COMMUNICATION - HEALTHEAST (OUTPATIENT)
Dept: FAMILY MEDICINE | Facility: CLINIC | Age: 34
End: 2019-01-11

## 2019-01-11 LAB — LAB SCANNED RESULT: NORMAL

## 2019-01-11 NOTE — PROGRESS NOTES
"MEDICAL ONCOLOGY CLINIC NOTE    REFERRING PROVIDER: Warren Mansfield MD from [facility] Urologic Oncology clinic.     REASON FOR CURRENT VISIT: Follow-up while on mitotane as adjuvant therapy of adrenocortical carcinoma.      HISTORY OF PRESENT ILLNESS: Ms. 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 Her oncologic history is detailed as under.     Suzy is looking worse overall. She has significant nausea with each dose of mitotane. She's taking 2000mg TID of mitotane with Compazine in AM and PM and Ativan at HS.     Two weeks ago, she had a UTI and forgot to double her replacement steroid dose. On Saturday, she forgot to fill the replacement steroid in her pill box and remained off all replacement for 3 days. Started feeling foggy and dizzy and ultimately had a fall y'day where she hit her head. She was evaluated in the [facility] ER on 1/8/18 and a CT head and C-spine without contrast were negative for acute issues or mets.  reports that she had a syncopal episode on Sunday as well as a pre-syncope on Fri/Sat last week.     She's still waiting for an appt with Dr. Hilton. After the last mitotane level of 6.2 in Dec 2018, she did reach out to his team over the phone and was recommended to keep increasing mitotane (was on 2g TID then) as tolerated. No clinical evidence of disease recurrence.     ONCOLOGIC HISTORY:   1. 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   - CT abdomen and pelvis stone protocol without contrast 5 8464 showed \"9.2 x 8 cm heterogeneous right upper quadrant mass with small foci of calcification within it which is likely arising from the adrenal gland though inseparable from liver and upper pole of right " "kidney. It is incompletely evaluated on this noncontrast study. 3 x 2.6 cm mass right lobe of liver on image #85 also incompletely evaluated. 6 x 4 x 4 mm upper third left ureteral obstructing stone with mild to moderate secondary hydronephrosis. Collection of stones at lower pole left kidney extending over an area of approximately 6 x 7 x 4 mm. Additional nonobstructing 1 mm stone upper pole left kidney. 2 mm nonobstructing stone noted upper pole right kidney with no hydronephrosis on the right. Postop change consistent with gastric bypass. Noncontrast images of spleen, left adrenal gland, gallbladder, and pancreas unremarkable. No aneurysm. No adenopathy.\"  - Seen by Dr. Delvalle at [facility]. Referred to Dr. Alvino Patel and then Dr. Warren Mansfield.  - Endocrinology team directed workup showed high DHEAS level of 740 pre-surgery.   - Right open adrenalectomy and hepatic mobilization performed by Dr. Warren Mansfield on 6/25/2018. Pathology showed adrenocortical carcinoma measuring 11.3 cm, weighing 413 g with extension into or through the adrenal capsule negative lymphovascular invasion, tumor necrosis present, margins involved by tumor, no regional lymph nodes identified, pathologic stage T2 NX.  - DHEAS normalized postsurgery.   - Adjuvant Mitotane started on 7/24/18. Dose increased to 2000mg TID around 10/23/18 due to persistently low troughs.  - Saw radiation oncology at [facility], radiation oncology at [facility], as well as endocrine oncology (Dr. Huertas) at [facility].   -S/p adjuvant RT by Dr. Monsivais - 5040 cGy over 30 fr (8/24/18-10/6/18).    REVIEW OF SYSTEMS: 14 point ROS negative other than the symptoms noted above in the HPI.    PAST MEDICAL AND SURGICAL HISTORY: Reviewed today  1. Right-sided adrenocortical carcinoma.  2. MHTFR mutation with h.o mutiple miscarriages.  3. Ovarian cysts diagnosed in 2011.  4. H/o gastric bypass in 2016 for " obesity.    SOCIAL HISTORY:   Social History     Tobacco Use     Smoking status: Never Smoker     Smokeless tobacco: Never Used   Substance Use Topics     Alcohol use: Yes     Comment: occ.     Drug use: No     FAMILY HISTORY:   Reviewed and non-contributory to current admission.      ALLERGIES:   Allergies   Allergen Reactions     Bee Venom Swelling     Ibuprofen Hives and Swelling     CURRENT MEDICATIONS:   Current Outpatient Medications:      acetaminophen (TYLENOL) 325 MG tablet, Take 2 tablets (650 mg) by mouth every 4 hours as needed for other (multimodal surgical pain management along with NSAIDS and opioid medication as indicated based on pain control and physical function.), Disp: 150 tablet, Rfl: 0     buPROPion (WELLBUTRIN) 100 MG tablet, TAKE 1 TABLET (100 MG TOTAL) BY MOUTH 2 (TWO) TIMES A DAY., Disp: , Rfl: 3     calcium carbonate antacid 1000 MG CHEW, Take 1 tablet by mouth every morning , Disp: , Rfl:      cholecalciferol (VITAMIN D-1000 MAX ST) 1000 units TABS, Take 2,000 Units by mouth every morning , Disp: , Rfl:      DRONABINOL PO, by Other route every other day Vape, Disp: , Rfl:      EPINEPHrine (EPIPEN/ADRENACLICK/OR ANY BX GENERIC EQUIV) 0.3 MG/0.3ML injection 2-pack, As directed for bee stings, Disp: , Rfl:      ferrous sulfate (IRON) 325 (65 Fe) MG tablet, Take 325 mg by mouth daily, Disp: , Rfl:      FLUoxetine (PROZAC) 40 MG capsule, Take 40 mg by mouth daily, Disp: , Rfl:      gabapentin (NEURONTIN) 300 MG capsule, Take 300 mg by mouth 3 times daily, Disp: , Rfl:      hydrocortisone (CORTEF) 10 MG tablet, Take 3 pills (=30 mg) in AM and 2 pills (=20 mg) at 2 pm daily, additional as directed., Disp: 180 tablet, Rfl: 11     levothyroxine (SYNTHROID/LEVOTHROID) 112 MCG tablet, Take 112 mcg by mouth daily, Disp: , Rfl:      melatonin 3 MG CAPS, Take 1 capsule by mouth At Bedtime, Disp: 30 capsule, Rfl: 11     mitotane (LYSODREN) 500 MG tablet CHEMO, Take 3 tablets (1,500 mg) by mouth 4 times  daily (Patient taking differently: Take 2,000 mg by mouth 3 times daily ), Disp: 360 tablet, Rfl: 0     Multiple Vitamins-Calcium (ONE-A-DAY WOMENS PO), Take 2 tablets by mouth every morning , Disp: , Rfl:      pantoprazole (PROTONIX) 40 MG EC tablet, Take 1 tablet (40 mg) by mouth daily (Patient taking differently: Take 40 mg by mouth daily as needed ), Disp: 30 tablet, Rfl: 0     prochlorperazine (COMPAZINE) 10 MG tablet, Take 1 tablet (10 mg) by mouth every 6 hours as needed for nausea or vomiting, Disp: 90 tablet, Rfl: 1     triamcinolone (KENALOG) 0.1 % lotion, Apply topically 3 times daily To rash on chest and shoudler, Disp: 60 mL, Rfl: 1     hydrocortisone sodium succinate PF (SOLU-CORTEF) 100 MG injection, Inject 2 mLs (100 mg) into the muscle once for 1 dose Use in emergency situations as discussed in clinic., Disp: 2 mL, Rfl: 1    PHYSICAL EXAMINATION:  Vital signs: /75 (BP Location: Right arm, Patient Position: Sitting, Cuff Size: Adult Regular)   Pulse 103   Temp 99  F (37.2  C) (Oral)   Resp 16   Wt 71.8 kg (158 lb 6.4 oz)   SpO2 100%   BMI 27.19 kg/m    ECOG performance status of 2.   GENERAL: Young lady in chair, in NAD  HEENT: No icterus, no pallor. MMM, PERRL, OP clear. Left periorbital ecchymosis present.   NECK: Supple, no JVD/LAD.  LUNGS: CTAB, normal WOB on RA   CARDIOVASCULAR: Regular rate and rhythm, no murmurs, gallops or rubs, normal S1/S2.   ABDOMEN: Well-healing abdominal incision without fluctuance or exudate. Soft, NT, ND, no masses appreciated, normal BS.   EXTREMITIES: No cyanosis, no clubbing, no edema.   NEUROLOGIC: No focal deficits, CN 2-12 intact. Linear thought process.    LYMPH NODE EXAM: No palpable adenopathy - cervical, supraclavicular.      LABORATORY DATA:  Lab Test 01/09/19  1018 01/08/19  1617 11/27/18  1349   WBC 4.4 9.2 4.7   RBC 4.68 4.19 4.17   HGB 9.7* 9.2* 8.5*   HCT 34.9* 31.4* 30.1*   MCV 75* 75* 72*   MCH 20.7* 22.0* 20.4*   MCHC 27.8* 29.3* 28.2*    RDW 18.1* 17.6* 17.7*    236 264   NEUTROPHIL 86.1 89.9 79.9     Lab Test 01/09/19  1018 01/08/19  1617 12/03/18 11/27/18  1349    138 141 140   POTASSIUM 3.5 4.2 4.7 4.4   CHLORIDE 102 106  --  108   CO2 27 25  --  27   ANIONGAP 7 7  --  5   GLC 84 88  --  82   BUN 7 10  --  7   CR 0.60 0.59  --  0.61   SHASHA 8.1* 7.3* 8.6 7.9*     Lab Test 01/09/19  1018 01/08/19  1617 12/03/18 11/27/18  1349   PROTTOTAL 6.2* 5.7*  --  6.3*   ALBUMIN 3.0* 3.0*  --  3.2*   BILITOTAL 0.3 0.3  --  0.2   ALKPHOS 182* 161* 167* 157*   AST 57* 51* 34 28   ALT 55* 49  --  24     DHEAS was 740 on 6/5/18, and has been <15 on 7/12/18, 8/20/18, 9/19/18, 10/23/18, 11/27/18, 1/9/19.  Mitotane level (target 14-20): 1.8ng/dl on 8/20/18, 3.5 on 10/25/18, 6.2 on 12/4/18, and 8.1 on 1/9/19.    IMAGING STUDIES:  CT CAP w contrast  11/23/18  1.  Interval resolution of previously seen right hemithorax abnormalities and near-complete resolution of postop change in the right abdomen. No convincing metastatic or recurrent neoplasm.  2. Multicystic abnormality is present in the left ovary and shows progression from the prior study. This can simply remarkably two adjacent cysts with an adjacent dominant follicle. It is not particularly complex to support other pathology which would be considered significantly less likely. Follow-up pelvic ultrasound beyond 2-3 months is recommended in reassessment.    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     Adrenocortical ca:  - Started on adjuvant mitotane in July 2018 based on her presentation and tumor characteristics including invasion of the adrenal capsule, high mitotic rate, possibly positive margins, and high Ki67, which could result in a rate of recurrence as high as 40%.    - Restaging CT results from 11/23/18 that show no evidence of disease recurrence. No evidence of clinical or biochemical  recurrence either.   - Patient tolerating adjuvant mitotane ok overall and will continue 2000mg TID for now until repeat levels come back.  - Mitotane level ([facility] lab #[id number]) continues to be subtherapeutic ~6 months since starting therapy. Goal Mitotane levels 14-20. Patient has developed an increasing burden of toxicities with mitotane therapy and I explained to the patient and  that I do not feel comfortable increasing the dose any further.  - LFT abnormality is mild and potentially associated with mitotane use.  - Plan to continue adjuvant Mitotane for up to 5 yrs if tolerated.  - Continue Compazine PRN for nausea.   - Still awaiting an appt with Dr. Huertas. I'll send him this note for review.   - Restaging CT ordered for Feb 2019.    Concern for ativan use disorder:  - Suzy has been dealing with tremendous stress over the past few months and was using Ativan HS almost daily for anxiety and nausea.   - I counseled her of the addictive potential of this drug and she did agree that she has felt increasingly desiring this medication.   - Will stop Ativan and encourage other medical providers to not give any further refills.  - Discussed health coping strategies including cognitive behavioral interventions. Will continue to provide support.     Endocrinopathies:  - Mgmt per endocrine team at the .     Iron deficiency anemia:  - MCV and iron panel suggests iron deficiency, she has heavy periods and no other obvious sources of blood loss.  - Pt on FeSO4 every day and Hb is improving. Monitor.   - She is planning to get a progesterone-only or inert IUD to reduce menstrual blood loss soon. Advised on the increased risk of DVTs with estrogen containing preparations due to MHTFR mutation with prior history of miscarriages.     Return to see me in 6-8 weeks with labs. Patient and family given opportunity to ask questions, which were answered to their satisfaction. They're in complete agreement as  planned.    BILLIN.    Benson Cintron M.D.  . Professor of Medicine  Genitourinary Oncology  Division of Hematology, Oncology & Transplantation  [facility]    CC: Edwin Huertas MD   [facility]

## 2019-01-14 ENCOUNTER — TELEPHONE (OUTPATIENT)
Dept: ONCOLOGY | Facility: CLINIC | Age: 34
End: 2019-01-14

## 2019-01-14 NOTE — TELEPHONE ENCOUNTER
----- Message from Benson Cintron MD sent at 1/11/2019  4:52 PM CST -----  Regarding: Fax updates to MD Lalo Paez,  Please fax my latest clinic note and recent labs to Dr. Edwin Huertas at:  [facility]  [facility]   Fax: [phone]   Thanks,  AR

## 2019-01-16 ENCOUNTER — CARE COORDINATION (OUTPATIENT)
Dept: ONCOLOGY | Facility: CLINIC | Age: 34
End: 2019-01-16

## 2019-01-16 DIAGNOSIS — F40.240 CLAUSTROPHOBIA: Primary | ICD-10-CM

## 2019-01-16 RX ORDER — DIAZEPAM 5 MG
5 TABLET ORAL
Qty: 2 TABLET | Refills: 3 | Status: SHIPPED | OUTPATIENT
Start: 2019-01-16 | End: 2020-09-22

## 2019-01-16 NOTE — PROGRESS NOTES
Message received from Li Kelly in scheduling stating that pt had called and requested something to help her relax during her upcoming MRI as she is claustrophobic. Per Dr. Cintron pt may have valium 5 mg tablet - see med list for sig. Rx faxed to  pharmacy in [place].

## 2019-01-22 ENCOUNTER — TELEPHONE (OUTPATIENT)
Dept: ENDOCRINOLOGY | Facility: CLINIC | Age: 34
End: 2019-01-22

## 2019-01-22 NOTE — TELEPHONE ENCOUNTER
[facility] Call Center    Phone Message    May a detailed message be left on voicemail: yes    Reason for Call: Other: PT looking to be seen with Dr. Jaquez, but has previously seen Dr. Beverly. I informed PT that Gisele is currently seeing only her return pts, but PT was adament that she was referred specifically to Gisele from her Oncologist, Dr. Cintron. Please follow up with PT for consideration and Scheduling at [phone].     Action Taken: Message routed to:  [facility] ([facility]): Diab/Endo

## 2019-01-23 PROBLEM — C74.01: Status: ACTIVE | Noted: 2018-06-25

## 2019-01-25 ENCOUNTER — OFFICE VISIT - HEALTHEAST (OUTPATIENT)
Dept: FAMILY MEDICINE | Facility: CLINIC | Age: 34
End: 2019-01-25

## 2019-01-25 DIAGNOSIS — Z30.430 ENCOUNTER FOR INSERTION OF INTRAUTERINE CONTRACEPTIVE DEVICE (IUD): ICD-10-CM

## 2019-01-25 LAB
HCG UR QL: NEGATIVE
SP GR UR STRIP: 1.01 (ref 1–1.03)

## 2019-01-25 ASSESSMENT — MIFFLIN-ST. JEOR: SCORE: 1379.21

## 2019-01-28 ENCOUNTER — ONCOLOGY VISIT (OUTPATIENT)
Dept: ONCOLOGY | Facility: CLINIC | Age: 34
End: 2019-01-28
Attending: PSYCHOLOGIST
Payer: COMMERCIAL

## 2019-01-28 DIAGNOSIS — F43.23 ADJUSTMENT DISORDER WITH MIXED ANXIETY AND DEPRESSED MOOD: Primary | ICD-10-CM

## 2019-01-28 PROCEDURE — 90834 PSYTX W PT 45 MINUTES: CPT | Performed by: PSYCHOLOGIST

## 2019-01-28 NOTE — PROGRESS NOTES
"Confidential Summary of Oncology Psychology Followup Visit    Suzy Rodríguez is a 33 year old female who presents for Depression  The patient was seen for a 42 minute appointment on 1/28/2019.    Subjective: She was tearful during the entire appointment. She reported ruminations of her cancer returning, her death by cancer, and how she has ruined her family. Extreme guilt and sadness accompany these thoughts. It is clear she is spending too much time in bed thinking about her cancer and her death. We discussed the bi-directional relationships among thought, mood, and behavior. She expressed understanding that she cannot just \"stop thinking\" so she needs to direct her thoughts toward recovery and gratitude.     Objective: Affect was anxious and depressed. She cried during the appointment.     Diagnosis:   Encounter Diagnosis   Name Primary?     Adjustment disorder with mixed anxiety and depressed mood Yes     Recommendations/Plan:  1. Increase her daily physical movement, stop cancer ruminations by creating thoughts of gratitude.  2. Return for follow-up as needed.     Thank you for this opportunity to participate in your care of this patient.    Ector Marcelino Psy.D., L.P.  Director, Oncology Supportive Care   "

## 2019-01-28 NOTE — LETTER
"    1/28/2019         RE: Suzy Rodríguez  [address]  [place] [address]        Dear Colleague,    Thank you for referring your patient, Suzy Rodríguez, to the [facility]. Please see a copy of my visit note below.    Confidential Summary of Oncology Psychology Followup Visit    Suzy Rodríguez is a 33 year old female who presents for Depression  The patient was seen for a 42 minute appointment on 1/28/2019.    Subjective: She was tearful during the entire appointment. She reported ruminations of her cancer returning, her death by cancer, and how she has ruined her family. Extreme guilt and sadness accompany these thoughts. It is clear she is spending too much time in bed thinking about her cancer and her death. We discussed the bi-directional relationships among thought, mood, and behavior. She expressed understanding that she cannot just \"stop thinking\" so she needs to direct her thoughts toward recovery and gratitude.     Objective: Affect was anxious and depressed. She cried during the appointment.     Diagnosis:   Encounter Diagnosis   Name Primary?     Adjustment disorder with mixed anxiety and depressed mood Yes     Recommendations/Plan:  1. Increase her daily physical movement, stop cancer ruminations by creating thoughts of gratitude.  2. Return for follow-up as needed.     Thank you for this opportunity to participate in your care of this patient.    Cassie Awan.TRACEY., L.P.  Director, Oncology Supportive Care     Again, thank you for allowing me to participate in the care of your patient.        Sincerely,        Ector Marcelino PsyD    "

## 2019-01-30 ENCOUNTER — MYC MEDICAL ADVICE (OUTPATIENT)
Dept: ONCOLOGY | Facility: CLINIC | Age: 34
End: 2019-01-30

## 2019-01-30 DIAGNOSIS — C74.01 MALIGNANT NEOPLASM OF CORTEX OF RIGHT ADRENAL GLAND (H): Primary | ICD-10-CM

## 2019-02-04 DIAGNOSIS — C74.91 MALIGNANT NEOPLASM OF ADRENAL GLAND, RIGHT (H): Primary | ICD-10-CM

## 2019-02-04 DIAGNOSIS — C74.91 MALIGNANT NEOPLASM OF ADRENAL GLAND, RIGHT (H): ICD-10-CM

## 2019-02-04 RX ORDER — LORAZEPAM 1 MG/1
1 TABLET ORAL EVERY 6 HOURS PRN
Qty: 60 TABLET | Refills: 1 | Status: SHIPPED | OUTPATIENT
Start: 2019-02-04 | End: 2019-05-08

## 2019-02-04 RX ORDER — LORAZEPAM 1 MG/1
1 TABLET ORAL EVERY 6 HOURS PRN
Qty: 60 TABLET | Refills: 1 | Status: SHIPPED | OUTPATIENT
Start: 2019-02-04 | End: 2019-02-04

## 2019-02-05 ENCOUNTER — TELEPHONE (OUTPATIENT)
Dept: ONCOLOGY | Facility: CLINIC | Age: 34
End: 2019-02-05

## 2019-02-05 NOTE — ORAL ONC MGMT
Oral Chemotherapy Monitoring Program    Primary Oncologist: Dr. Cintron  Primary Oncology Clinic: [facility]  Cancer Diagnosis: Adrenocortical Carcinoma    Therapy History:  Mitotane started 2018: 500mg BID  : 750mg BID or 1000/500mg  : 1000mg BID  : 1500mg PO BID  10/31: 1500mg PO three times daily  1500mg PO QID  Held 19 for two weeks  Resumed  at 1000 mg po BID    Drug Interaction Assessment: Mitotane-Wellbutrin -Acetaminophen -Calcium Carbonate -Cholecalciferol -Valium -Dronabinol -Ferrous Sulfate -PROzac -Gabapentin   -Hydrocortisone  -Synthroid -Ativan -Melatonin -Protonix -Prochlorperazine -Ondansetron   Drug interactions included dronabinol and Mitotane = Monitor patients who use dronabinol in combination with strong CY inducers closely for reduced systemic effects of dronabinol.      Lab Monitoring Plan  Lab monitoring will take place per Dr. Cintron's and Dr. Hilton's requested frequency.    Subjective/Objective:  Suzy Rodríguez is a 33 year old female contacted by phone for a follow-up visit for oral chemotherapy.  Suzy stated she resumed Mitotane on 19 after being off for 2 weeks.  She stated she resumed taking at 1000 mg po BID.  She states that the only side effect she has noticed since resuming medication is nausea.  She states she takes a Compazine prior to each dose of Mitotane and she is taking the Mitotane with food.  Suzy states she will be getting a Mitotane level on 19.    ORAL CHEMOTHERAPY 2018 2018 2018 10/1/2018 10/31/2018 2018 2019   Drug Name Lysodren (Mitotane) Lysodren (Mitotane) Lysodren (Mitotane) Lysodren (Mitotane) Lysodren (Mitotane) Lysodren (Mitotane) Lysodren (Mitotane)   Current Dosage 500mg - 500mg 1500mg 1500mg 1500mg 1000mg   Current Schedule Other Other Other BID TID QID BID   Cycle Details Continuous Continuous Continuous Continuous Continuous Continuous Continuous   Start Date of Last Cycle -  "7/24/2018 7/24/2018 9/28/2018 10/31/2018 - 1/30/2019   Planned next cycle start date - - - 10/18/2018 - - -   Doses missed in last 2 weeks - - - 0 - - -   Adherence Assessment - - Adherent Adherent - - Adherent   Adverse Effects - - - No AE identified during assessment - - Nausea   Nausea - - - - - - Grade 1   Pharmacist Intervention(nausea) - - - - - - Yes   Intervention(s) - - - - - - Patient education   Any new drug interactions? - No - No - - -   Is the dose as ordered appropriate for the patient? - - - Yes - - -       Vitals:  BP:   BP Readings from Last 1 Encounters:   01/09/19 123/75     Wt Readings from Last 1 Encounters:   01/09/19 71.8 kg (158 lb 6.4 oz)     Estimated body surface area is 1.8 meters squared as calculated from the following:    Height as of 1/8/19: 1.626 m (5' 4\").    Weight as of 1/9/19: 71.8 kg (158 lb 6.4 oz).    Labs:  _  Result Component Current Result Ref Range   Sodium 136 (1/9/2019) 133 - 144 mmol/L     _  Result Component Current Result Ref Range   Potassium 3.5 (1/9/2019) 3.4 - 5.3 mmol/L     _  Result Component Current Result Ref Range   Calcium 8.1 (L) (1/9/2019) 8.5 - 10.1 mg/dL     No results found for Mag within last 30 days.     No results found for Phos within last 30 days.     _  Result Component Current Result Ref Range   Albumin 3.0 (L) (1/9/2019) 3.4 - 5.0 g/dL     _  Result Component Current Result Ref Range   Urea Nitrogen 7 (1/9/2019) 7 - 30 mg/dL     _  Result Component Current Result Ref Range   Creatinine 0.60 (1/9/2019) 0.52 - 1.04 mg/dL       _  Result Component Current Result Ref Range   AST 57 (H) (1/9/2019) 0 - 45 U/L     _  Result Component Current Result Ref Range   ALT 55 (H) (1/9/2019) 0 - 50 U/L     _  Result Component Current Result Ref Range   Bilirubin Total 0.3 (1/9/2019) 0.2 - 1.3 mg/dL       _  Result Component Current Result Ref Range   WBC 4.4 (1/9/2019) 4.0 - 11.0 10e9/L     _  Result Component Current Result Ref Range   Hemoglobin 9.7 (L) " (1/9/2019) 11.7 - 15.7 g/dL     _  Result Component Current Result Ref Range   Platelet Count 229 (1/9/2019) 150 - 450 10e9/L     _  Result Component Current Result Ref Range   Absolute Neutrophil 3.8 (1/9/2019) 1.6 - 8.3 10e9/L         Assessment:  Suzy is tolerating the resumption of Mitotane at 1000 mg po bid.    Plan:  Suzy will continue with the current dose of Mitotane and states that Dr. Hilton told her to increase after 2 weeks of therapy so will be increasing on 2/13/19, most likely to 1500 mg po BID.  I spoke briefly with Dr. Cintron about patient and told him she resumed therapy on 1/30/19 and stated she was getting a level on 2/6.  He wondered if the level was needed as it will be low, he is having Padmini look into the need if required or if she should be seeing him sooner.     Follow-Up:  Will follow-up with patient's mitotane lab level or review Dr. Cintron's visit note which ever is done sooner. Follow-up with patient in 1 month or sooner based on need.    Refill Due:  Patient said she had a full month supply left but she was in [place] flying back today so couldn't count medication supply. Patient knows to send a [employer] Newsletter message when she increases her dose and to let us know quantity she has on hand.    Fannie Stringer, Pharm.D., [facility]  [phone]  02/05/19

## 2019-02-05 NOTE — TELEPHONE ENCOUNTER
TC to pt per Dr. Cintron. No answer. LM stating that she doesn't need her mitotane level checked tomorrow, but should plan on keeping appts for scans and to see Dr. Cintron next week at which time a plan will be made as to when her next mitotane level will need to be drawn. Requested pt call clinic with questions or concerns.

## 2019-02-06 DIAGNOSIS — C74.01 ADRENAL CORTEX CANCER, RIGHT (H): ICD-10-CM

## 2019-02-06 LAB — MISCELLANEOUS TEST: NORMAL

## 2019-02-06 PROCEDURE — 99000 SPECIMEN HANDLING OFFICE-LAB: CPT | Performed by: INTERNAL MEDICINE

## 2019-02-06 PROCEDURE — 36415 COLL VENOUS BLD VENIPUNCTURE: CPT | Performed by: INTERNAL MEDICINE

## 2019-02-06 PROCEDURE — 80375 DRUG/SUBSTANCE NOS 1-3: CPT | Mod: 90 | Performed by: INTERNAL MEDICINE

## 2019-02-07 LAB — LAB SCANNED RESULT: NORMAL

## 2019-02-08 ENCOUNTER — DOCUMENTATION ONLY (OUTPATIENT)
Dept: PHARMACY | Facility: CLINIC | Age: 34
End: 2019-02-08

## 2019-02-08 NOTE — PROGRESS NOTES
Oral Chemotherapy Monitoring Program  Lab Follow Up    Patient currently on mitotane therapy for adrenocortical carcinoma.    Reviewed lab results from 2/6/19.    Assessment & Plan:  Results show a mitotane level of 10.7 which is below the therapeutic range of 14-20. Sent message to Dr. Cintron to discuss plan for dose increasing to achieve a therapeutic level. Suzy has an appointment with Dr. Cintron on 2/12.     Will wait to call the patient to let her know the result until we receive correspondence from Dr. Cintron.     Follow-Up:  2/12 appt with Dr. Abdulaziz Fenton, PharmD, MS  Hematology/Oncology Clinical Pharmacist  [facility]  [facility]

## 2019-02-09 NOTE — PROGRESS NOTES
"    The patient is seen in f/up for adrenocortical cancer. She was previously seen by Dr. Beverly.     Suzy Rodríguez is a 33 year old female with past medical history of PCOS, nephrolithiasis, MTHFR clotting disorder and gastric bypass surgery (2016), diagnosed with adrenal cortical carcinoma in 2018.  The adrenal mass was first identified in May 2018, on an abdominal CT done for evaluation of kidney stones.  The mass measured 9.2 x 8 cm on the noncontrast CT from 18, and was inseparable from the liver and upper pole of the right kidney.  The chest CT from 18 identified 2 indeterminate solid pulmonary nodules, with the largest measuring 3 mm in the subpleural posterior left lower lobe.  The patient underwent right adrenalectomy on 18.  Surgery was complicated by diaphragmatic injury during hepatic mobilization and small fracture of the right liver lobe, managed conservatively.  The pathology report (re read at [facility]) revealed the following:  \"Adrenal cortical carcinoma, low grade, oncocytic type, forming a mass 11.3 cm and weighing 412 grams (per report). Surgical resection margins are positive for tumor (per report). Lymphovascular invasion is focally present. The tumor seems to be confined to the adrenal gland.  AJCC Stage (with available surgical materials): pT2NX (8th edition, 2017). Immunoperoxidase stains were performed on paraffin sections at the referring institution and reviewed at [facility] in [place], [place].  Tumor cells are positive for calretinin, synaptophysin and Melan A, supporting the above diagnosis. Ki-67 reveal an increased proliferation index of approximately 20% in the most active areas.\"    Treatment with mitotane was started in 2018 and she completed radiation to the adrenal bed on 2018.  Mitotane dosin/24: 500mg BID  : 750mg BID or 1000/500mg  : 1000mg BID  : 1500mg PO BID  10/31: 1500mg PO three times daily  1500mg PO QID  Held " 19 for two weeks  Resumed  at 1000 mg po BID    In 2018 [facility]  She was diagnosed with secondary hypothyroidism and started on 112 mcg levothyroxine daily.    Pertinent data labs:  18 normal plasma metanephrines  18 ALD 13.4, renin 0.8   18 urinary cortisol 351.6, 8 AM cortisol 23 (after dexamethasone), normal metanephrines     18 ACTH<10, DHEAS 740   18  DHEAS <0.5, cortisol AM 19, testosterone <7    18 aldosterone 7.3  8/10/18 CRH 3 (normal<10)  12/3/18 cortisol PM 36, free T4 0.7, TSH 2.3, Ca 8.6,  (elevated since )   18 ACTH <5   18  DHEAS <0.5  18 ferritin 9  IUD inserted on 19 DHEAS <15     Pertinent imagin18 MRI brain  -negative   18 PET/CT Residual fluid collection at the vicinity of the adrenal gland with mild peripheral FDG uptake. This may represent postsurgical fluid collection.   2. Increased right pleural effusion since 2018. There is a plaque-like pleural thickening with mild elevated FDG uptake. This finding is indeterminant, while its favored to represent an infectious/inflammatory condition, metastasis cannot be clearly excluded. Attention on follow up.  3. Non-FDG avid 2.5 left complex adnexal cyst.  18 chest/abd/pelvic CT -I reviewed the CT images.  1.  Interval resolution of previously seen right hemithorax abnormalities and near-complete resolution of postop change in the right abdomen. No convincing metastatic or recurrent neoplasm.  2. Multicystic abnormality is present in the left ovary and shows progression from the prior study. This can simply remarkably two adjacent cysts with an adjacent dominant follicle. It is not particularly complex to support other pathology which would be considered significantly less likely. Follow-up pelvic ultrasound beyond 2-3 months is recommended in reassessment.  12/15/18 - CT read at [facility]:  Interval resolution of the  previously seen right lower lobe collapse/consolidation and pleural effusion. Residual scarring/groundglass opacity in the basal right lower lobe with 2 irregular discrete lung nodules.    Suzy underwent bariatric surgery in 6/2016. Pre-surgery weight 235 lb, inga post-surgery 135 lbs. Her weight in 4/2017 was 139 lbs. In 3/2018, the weight was 154 lbs. It was 157 lbs prior to adrenalectomy and it has remained stable.     She has been feeling extremely tired.  Postprandial nausea has been persistent since taking mitotane, associated with vomiting, a couple of times a day.  Her appetite is fine.  Recently, she has noticed mild constipation, with hard stools.  On January 8th, she was evaluated in the emergency room for an episode of syncope, associated with a fall.  At that time, the patient endorsed dizziness, lightheadedness, fatigue, headaches and blurred vision.  Prior to that hospitalization, she missed the hydrocortisone for 3 days up until the day prior to the ER visit, when she doubled her usual dose of hydrocortisone.  The electrolytes checked in the emergency room were normal.  She was discharged on a double dose of oral hydrocortisone for 3 days.    The dizziness is now present mainly when she rapidly stands up from a sitting or lying position.  It is associated with blurred vision.  During the 2 weeks when mitotane was held, all her symptoms resolved.    Current dose of hydrocortisone is 30 mg in the morning, when she wakes up, and 20 mg at 2:30 PM.  She reports taking the levothyroxine at 8 PM, 112 mcg daily.    Past Medical History   Past Medical History:   Diagnosis Date     Adrenal cortex cancer, right (H) 6/30/2018    Resected 6/25/18, Dr. Mansfield.     Adrenal mass (H)      Chocolate cyst of ovary 2011     History of miscarriage      MTHFR mutation (H)    Diverticulosis  Obstructive sleep apnea which resolved following gastric bypass  GERD  Iron deficiency  Anemia  Ovarian cyst     Past Surgical  History   Past Surgical History:   Procedure Laterality Date     ADRENALECTOMY Right 2018    Procedure: ADRENALECTOMY;  Right Adrenalectomy,  Embolize Liver , Anesthesia Block;  Surgeon: Warren Mansfield MD;  Location: [facility] OR     ADRENALECTOMY        SECTION      twice      SECTION      2     EMBOLECTOMY ABDOMEN N/A 2018    Procedure: EMBOLECTOMY ABDOMEN;;  Surgeon: Ector Mcintyre MD;  Location: [facility] OR     EXTRACORPOREAL SHOCK WAVE LITHOTRIPSY (ESWL)       GASTRIC BYPASS         Current Medications    Current Outpatient Medications:      acetaminophen (TYLENOL) 325 MG tablet, Take 2 tablets (650 mg) by mouth every 4 hours as needed for other (multimodal surgical pain management along with NSAIDS and opioid medication as indicated based on pain control and physical function.), Disp: 150 tablet, Rfl: 0     buPROPion (WELLBUTRIN) 100 MG tablet, TAKE 1 TABLET (100 MG TOTAL) BY MOUTH 2 (TWO) TIMES A DAY., Disp: , Rfl: 3     calcium carbonate antacid 1000 MG CHEW, Take 1 tablet by mouth every morning , Disp: , Rfl:      cholecalciferol (VITAMIN D-1000 MAX ST) 1000 units TABS, Take 2,000 Units by mouth every morning , Disp: , Rfl:      diazepam (VALIUM) 5 MG tablet, Take 1 tablet (5 mg) by mouth once as needed for anxiety (MRI claustrophobia management), Disp: 2 tablet, Rfl: 3     DRONABINOL PO, by Other route every other day Vape, Disp: , Rfl:      EPINEPHrine (EPIPEN/ADRENACLICK/OR ANY BX GENERIC EQUIV) 0.3 MG/0.3ML injection 2-pack, As directed for bee stings, Disp: , Rfl:      ferrous sulfate (IRON) 325 (65 Fe) MG tablet, Take 325 mg by mouth daily, Disp: , Rfl:      FLUoxetine (PROZAC) 40 MG capsule, Take 40 mg by mouth daily, Disp: , Rfl:      gabapentin (NEURONTIN) 300 MG capsule, Take 300 mg by mouth 3 times daily, Disp: , Rfl:      hydrocortisone (CORTEF) 10 MG tablet, Take 3 pills (=30 mg) in AM and 2 pills (=20 mg) at 2 pm daily, additional as directed., Disp: 180  tablet, Rfl: 11     hydrocortisone sodium succinate PF (SOLU-CORTEF) 100 MG injection, Inject 2 mLs (100 mg) into the muscle once for 1 dose Use in emergency situations as discussed in clinic., Disp: 2 mL, Rfl: 1     levothyroxine (SYNTHROID/LEVOTHROID) 112 MCG tablet, Take 112 mcg by mouth daily, Disp: , Rfl:      LORazepam (ATIVAN) 1 MG tablet, Take 1 tablet (1 mg) by mouth every 6 hours as needed for nausea, Disp: 60 tablet, Rfl: 1     melatonin 3 MG CAPS, Take 1 capsule by mouth At Bedtime, Disp: 30 capsule, Rfl: 11     mitotane (LYSODREN) 500 MG tablet CHEMO, Take 3 tablets (1,500 mg) by mouth 4 times daily (Patient taking differently: Take 2,000 mg by mouth 3 times daily ), Disp: 360 tablet, Rfl: 0     Multiple Vitamins-Calcium (ONE-A-DAY WOMENS PO), Take 2 tablets by mouth every morning , Disp: , Rfl:      pantoprazole (PROTONIX) 40 MG EC tablet, Take 1 tablet (40 mg) by mouth daily (Patient taking differently: Take 40 mg by mouth daily as needed ), Disp: 30 tablet, Rfl: 0     prochlorperazine (COMPAZINE) 10 MG tablet, Take 1 tablet (10 mg) by mouth every 6 hours as needed for nausea or vomiting, Disp: 90 tablet, Rfl: 1     triamcinolone (KENALOG) 0.1 % lotion, Apply topically 3 times daily To rash on chest and shoudler, Disp: 60 mL, Rfl: 1  Iron supplement daily for 6 months     History reviewed. No pertinent family history.    Social History   with 2 children, 7 and 10-year-old. She denies smoking, drinking alcohol or using illicit drugs. Occupation: disabled.     Review of Systems   Systemic:             As above   Eye:                      No eye symptoms   Saira-Laryngeal:     occasional dysphagia, no hoarseness, no cough     Breast:                  No breast symptoms  Cardiovascular:    No cardiovascular symptoms, no CP or palpitations   Pulmonary:           SOB when anxious and with exertion   Gastrointestinal:  As above   Genitourinary:       increased thirst; increased urination - urinates 1-2  "times a night since having the kids  Endocrine:            Sometimes feet feel cold  Neurological:        No neurological symptoms, no headaches, no tremor, no numbness or tingling sensation, no dizziness   Musculoskeletal:  Lower extremities muscle pain, mainly in her thighs, since starting tx with mitotane   Skin:                     dry skin, hair falling out   Psychological:     Lots of anxiety and depression; not suicidal               Vital Signs     Previous Weights:    Wt Readings from Last 10 Encounters:   02/11/19 71.3 kg (157 lb 1.6 oz)   01/09/19 71.8 kg (158 lb 6.4 oz)   01/08/19 70.3 kg (155 lb)   11/27/18 70.8 kg (156 lb)   11/26/18 71.3 kg (157 lb 3.2 oz)   10/23/18 71 kg (156 lb 8 oz)   10/18/18 70.6 kg (155 lb 9.6 oz)   10/03/18 70.8 kg (156 lb)   09/26/18 70.3 kg (155 lb)   09/19/18 71.7 kg (158 lb 1.1 oz)        /77   Pulse 97   Ht 1.626 m (5' 4\")   Wt 71.3 kg (157 lb 1.6 oz)   BMI 26.97 kg/m    Supine /77 pulse 83   Standing 2 minutes /77, pulse 97     Physical Exam  General Appearance: pale, sad looking, well developed, well nourished  Eyes:  conjutivae and extra-ocular motions are normal.                                    pupils round and reactive to light, no lid lag, no stare    HEENT:   oropharynx clear and moist, no JVD, no bruits      no thyromegaly, no palpable nodules   Cardiovascular:  regular rhythm, no murmurs, distal pulse palpable, no edema  Respiratory:        chest clear, no rales, no rhonchi   Gastrointestinal:  abdomen soft, non-tender, non-distended, normal bowel sounds,    no organomegaly  Musculoskeletal:  normal tone and strength  Psychological:          affect and judgment normal  Skin:  narrow striae on the lateral abdominal flanks   Neurological:  reflexes symmetric and overactive, no resting tremor.     Lab Results  I reviewed prior lab results documented in Epic.  TSH   Date Value Ref Range Status   12/03/2018 2.3 mcU/mL Final     Assessment "     1. 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 Ki-67 revealed an increased proliferation index of approximately 20%.   The tumor was functional and presented with high cortisol and DHEAS levels.  Post surgery/radiation and while undergoing treatment with mitotane, the DHEAS has remained undetectable. Most recent CT identified 2 discrete lung nodules.     The patient has been treated with mitotane since July 2018.  Currently, I suspect most of her symptoms are side effects from mitotane.  The fact that the symptoms completely resolved during the drug holiday further supports this statement.  Plan:  Follow-up CT scheduled today  Dose of mitotane to be adjusted based on the CT results and oncology input.  Follow-up androstenedione as a potential marker for recurrence  Try to evaluate for cortisol secretion by checking 24-hour urinary cortisol.  Alternatively, we can check morning cortisol level prior to taking the morning dose of hydrocortisone, with the caveat that mitotane can increase CBG levels and cause a falsely high result.     2.  Primary adrenal insufficiency, mitotane-induced    She definitely has hydrocortisone deficiency.  Unclear at this point whether or not the aldosterone secretion is affected by mitotane.  Her electrolytes have remained stable but she does have some orthostatic tachycardia, which can be multifactorial in etiology.    Recommendations:   Continue to take a higher dose of hydrocortisone of 30 mg in the morning and 20 mg at 2:30 PM, in view of the accelerated hydrocortisone metabolism induced by mitotane.  Check morning BMP, ACTH, aldosterone and renin levels  F/up urinary cortisol     3. Hypothyroidism, presumed to be secondary to mitotane induced suppression of TSH   The patient has been on 112 mcg levothyroxine replacement for over 6 weeks.  She takes the medication at bedtime.   Clinically, she does have overactive reflexes.  Plan:  Check TFTs today and adjust the dose of levothyroxine accordingly  Advised the patient to take the levothyroxine on empty stomach, with water,  from all the other supplements/multivitamins for at least 4 hours.    4.  Hair loss, anemia  Prior ferritin levels have been very low.  Expect to improve since at the insertion of the IUD.  Follow-up ferritin level with her next lab work.    Orders Placed This Encounter   Procedures     Adrenal corticotropin     Basic metabolic panel     Renin activity     Androstenedione     Cortisol free urine     Creatinine timed urine     TSH     T4 free     T3 Free       Total of 40 minutes was spend in reviewing chart, prior notes, imaging and labs.

## 2019-02-11 ENCOUNTER — ANCILLARY PROCEDURE (OUTPATIENT)
Dept: MRI IMAGING | Facility: CLINIC | Age: 34
End: 2019-02-11
Payer: COMMERCIAL

## 2019-02-11 ENCOUNTER — OFFICE VISIT (OUTPATIENT)
Dept: ENDOCRINOLOGY | Facility: CLINIC | Age: 34
End: 2019-02-11
Payer: COMMERCIAL

## 2019-02-11 ENCOUNTER — ANCILLARY PROCEDURE (OUTPATIENT)
Dept: CT IMAGING | Facility: CLINIC | Age: 34
End: 2019-02-11
Payer: COMMERCIAL

## 2019-02-11 ENCOUNTER — RECORDS - HEALTHEAST (OUTPATIENT)
Dept: ADMINISTRATIVE | Facility: OTHER | Age: 34
End: 2019-02-11

## 2019-02-11 VITALS
DIASTOLIC BLOOD PRESSURE: 77 MMHG | HEART RATE: 97 BPM | SYSTOLIC BLOOD PRESSURE: 117 MMHG | WEIGHT: 157.1 LBS | HEIGHT: 64 IN | BODY MASS INDEX: 26.82 KG/M2

## 2019-02-11 DIAGNOSIS — E27.40 ADRENAL INSUFFICIENCY (H): ICD-10-CM

## 2019-02-11 DIAGNOSIS — D50.0 IRON DEFICIENCY ANEMIA DUE TO CHRONIC BLOOD LOSS: ICD-10-CM

## 2019-02-11 DIAGNOSIS — C74.01 MALIGNANT NEOPLASM OF CORTEX OF RIGHT ADRENAL GLAND (H): ICD-10-CM

## 2019-02-11 DIAGNOSIS — C74.01 ADRENAL CORTEX CANCER, RIGHT (H): ICD-10-CM

## 2019-02-11 DIAGNOSIS — E03.8 SECONDARY HYPOTHYROIDISM: ICD-10-CM

## 2019-02-11 DIAGNOSIS — C74.01 ADRENAL CORTEX CANCER, RIGHT (H): Primary | ICD-10-CM

## 2019-02-11 DIAGNOSIS — L65.9 HAIR LOSS: ICD-10-CM

## 2019-02-11 RX ORDER — GADOBUTROL 604.72 MG/ML
7.5 INJECTION INTRAVENOUS ONCE
Status: COMPLETED | OUTPATIENT
Start: 2019-02-11 | End: 2019-02-11

## 2019-02-11 RX ORDER — IOPAMIDOL 755 MG/ML
96 INJECTION, SOLUTION INTRAVASCULAR ONCE
Status: COMPLETED | OUTPATIENT
Start: 2019-02-11 | End: 2019-02-11

## 2019-02-11 RX ADMIN — IOPAMIDOL 96 ML: 755 INJECTION, SOLUTION INTRAVASCULAR at 18:33

## 2019-02-11 RX ADMIN — GADOBUTROL 7 ML: 604.72 INJECTION INTRAVENOUS at 18:09

## 2019-02-11 ASSESSMENT — MIFFLIN-ST. JEOR: SCORE: 1397.6

## 2019-02-11 ASSESSMENT — PAIN SCALES - GENERAL: PAINLEVEL: NO PAIN (0)

## 2019-02-11 NOTE — PATIENT INSTRUCTIONS
Instructions for Collection of a 24 hour or Timed Urine Specimen    Your doctor has requested that you collect all of your urine for a 24 hour urine lab test. Please follow the instructions below    1. The day before you are to bring your specimen:  At 7:00am (or when you get up the day) empty your bladder as completely as possible. Discard this specimen.    2. During the 24 hour collection period store the collection container in a cool place or in the refrigerator. Some collections require a special preservative that will be in the container if required.    3. At 7:00am the next day (or when you started your urine collection on the previous day) void and add to the collection container. Record the time and date of the end of the collection period. This completes the 24 hour urine collection.    4. Bring the entire specimen directly to the lab  A lab with your name, medical record number and date of birth must be attached to the urine container.  A lab request form completed by your clinic/doctor with your name medical number number, date of birth and test requested must accompany the urine specimen.  Record on the lab request form the date and time (am/pm) of the start of the urine collection and date and time of the end of the urine collection.    5. If you have any questions, feel free to call the laboratory at [phone] or [phone] or your clinic.

## 2019-02-11 NOTE — LETTER
"2/11/2019       RE: Suzy Rodríguez  [address]  [facility] [address]     Dear Colleague,    Thank you for referring your patient, Suzy Rodríguez, to the [facility] ENDOCRINOLOGY at [facility]. Please see a copy of my visit note below.        The patient is seen in f/up for adrenocortical cancer. She was previously seen by Dr. Beverly.     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  The adrenal mass was first identified in May 2018, on an abdominal CT done for evaluation of kidney stones.  The mass measured 9.2 x 8 cm on the noncontrast CT from 5/8/18, and was inseparable from the liver and upper pole of the right kidney.  The chest CT from 6/14/18 identified 2 indeterminate solid pulmonary nodules, with the largest measuring 3 mm in the subpleural posterior left lower lobe.  The patient underwent right adrenalectomy on 6/25/18.  Surgery was complicated by diaphragmatic injury during hepatic mobilization and small fracture of the right liver lobe, managed conservatively.  The pathology report (re read at [facility]) revealed the following:  \"Adrenal cortical carcinoma, low grade, oncocytic type, forming a mass 11.3 cm and weighing 412 grams (per report). Surgical resection margins are positive for tumor (per report). Lymphovascular invasion is focally present. The tumor seems to be confined to the adrenal gland.  AJCC Stage (with available surgical materials): pT2NX (8th edition, 2017). Immunoperoxidase stains were performed on paraffin sections at the referring institution and reviewed at [facility] in [place], [place].  Tumor cells are positive for calretinin, synaptophysin and Melan A, supporting the above diagnosis. Ki-67 reveal an increased proliferation index of approximately 20% in the most active " "areas.\"    Treatment with mitotane was started in 2018 and she completed radiation to the adrenal bed on 2018.  Mitotane dosin/24: 500mg BID  : 750mg BID or 1000/500mg  : 1000mg BID  : 1500mg PO BID  10/31: 1500mg PO three times daily  1500mg PO QID  Held 19 for two weeks  Resumed  at 1000 mg po BID    In 2018 she was evaluated at [facility].  She was diagnosed with secondary hypothyroidism and started on 112 mcg levothyroxine daily.    Pertinent data labs:  18 normal plasma metanephrines  18 ALD 13.4, renin 0.8   18 urinary cortisol 351.6, 8 AM cortisol 23 (after dexamethasone), normal metanephrines     18 ACTH<10, DHEAS 740   18  DHEAS <0.5, cortisol AM 19, testosterone <7    18 aldosterone 7.3  8/10/18 CRH 3 (normal<10)  12/3/18 cortisol PM 36, free T4 0.7, TSH 2.3, Ca 8.6,  (elevated since )   18 ACTH <5   18  DHEAS <0.5  18 ferritin 9  IUD inserted on 19 DHEAS <15     Pertinent imagin18 MRI brain  -negative   18 PET/CT Residual fluid collection at the vicinity of the adrenal gland with mild peripheral FDG uptake. This may represent postsurgical fluid collection.   2. Increased right pleural effusion since 2018. There is a plaque-like pleural thickening with mild elevated FDG uptake. This finding is indeterminant, while its favored to represent an infectious/inflammatory condition, metastasis cannot be clearly excluded. Attention on follow up.  3. Non-FDG avid 2.5 left complex adnexal cyst.  18 chest/abd/pelvic CT -I reviewed the CT images.  1.  Interval resolution of previously seen right hemithorax abnormalities and near-complete resolution of postop change in the right abdomen. No convincing metastatic or recurrent neoplasm.  2. Multicystic abnormality is present in the left ovary and shows progression from the prior study. This can simply remarkably two adjacent " cysts with an adjacent dominant follicle. It is not particularly complex to support other pathology which would be considered significantly less likely. Follow-up pelvic ultrasound beyond 2-3 months is recommended in reassessment.  12/15/18 - CT read at N:  Interval resolution of the previously seen right lower lobe collapse/consolidation and pleural effusion. Residual scarring/groundglass opacity in the basal right lower lobe with 2 irregular discrete lung nodules.    Suzy underwent bariatric surgery in 6/2016. Pre-surgery weight 235 lb, inga post-surgery 135 lbs. Her weight in 4/2017 was 139 lbs. In 3/2018, the weight was 154 lbs. It was 157 lbs prior to adrenalectomy and it has remained stable.     She has been feeling extremely tired.  Postprandial nausea has been persistent since taking mitotane, associated with vomiting, a couple of times a day.  Her appetite is fine.  Recently, she has noticed mild constipation, with hard stools.  On January 8th, she was evaluated in the emergency room for an episode of syncope, associated with a fall.  At that time, the patient endorsed dizziness, lightheadedness, fatigue, headaches and blurred vision.  Prior to that hospitalization, she missed the hydrocortisone for 3 days up until the day prior to the ER visit, when she doubled her usual dose of hydrocortisone.  The electrolytes checked in the emergency room were normal.  She was discharged on a double dose of oral hydrocortisone for 3 days.    The dizziness is now present mainly when she rapidly stands up from a sitting or lying position.  It is associated with blurred vision.  During the 2 weeks when mitotane was held, all her symptoms resolved.    Current dose of hydrocortisone is 30 mg in the morning, when she wakes up, and 20 mg at 2:30 PM.  She reports taking the levothyroxine at 8 PM, 112 mcg daily.    Past Medical History   Past Medical History:   Diagnosis Date     Adrenal cortex cancer, right (H) 6/30/2018     Resected 18, Dr. Mansfield.     Adrenal mass (H)      Chocolate cyst of ovary      History of miscarriage      MTHFR mutation (H)    Diverticulosis  Obstructive sleep apnea which resolved following gastric bypass  GERD  Iron deficiency  Anemia  Ovarian cyst     Past Surgical History   Past Surgical History:   Procedure Laterality Date     ADRENALECTOMY Right 2018    Procedure: ADRENALECTOMY;  Right Adrenalectomy,  Embolize Liver , Anesthesia Block;  Surgeon: Warren Mansfield MD;  Location: [facility] OR     ADRENALECTOMY        SECTION      twice      SECTION      2     EMBOLECTOMY ABDOMEN N/A 2018    Procedure: EMBOLECTOMY ABDOMEN;;  Surgeon: Ector Mcintyre MD;  Location: [facility] OR     EXTRACORPOREAL SHOCK WAVE LITHOTRIPSY (ESWL)       GASTRIC BYPASS         Current Medications    Current Outpatient Medications:      acetaminophen (TYLENOL) 325 MG tablet, Take 2 tablets (650 mg) by mouth every 4 hours as needed for other (multimodal surgical pain management along with NSAIDS and opioid medication as indicated based on pain control and physical function.), Disp: 150 tablet, Rfl: 0     buPROPion (WELLBUTRIN) 100 MG tablet, TAKE 1 TABLET (100 MG TOTAL) BY MOUTH 2 (TWO) TIMES A DAY., Disp: , Rfl: 3     calcium carbonate antacid 1000 MG CHEW, Take 1 tablet by mouth every morning , Disp: , Rfl:      cholecalciferol (VITAMIN D-1000 MAX ST) 1000 units TABS, Take 2,000 Units by mouth every morning , Disp: , Rfl:      diazepam (VALIUM) 5 MG tablet, Take 1 tablet (5 mg) by mouth once as needed for anxiety (MRI claustrophobia management), Disp: 2 tablet, Rfl: 3     DRONABINOL PO, by Other route every other day Vape, Disp: , Rfl:      EPINEPHrine (EPIPEN/ADRENACLICK/OR ANY BX GENERIC EQUIV) 0.3 MG/0.3ML injection 2-pack, As directed for bee stings, Disp: , Rfl:      ferrous sulfate (IRON) 325 (65 Fe) MG tablet, Take 325 mg by mouth daily, Disp: , Rfl:      FLUoxetine (PROZAC) 40 MG  capsule, Take 40 mg by mouth daily, Disp: , Rfl:      gabapentin (NEURONTIN) 300 MG capsule, Take 300 mg by mouth 3 times daily, Disp: , Rfl:      hydrocortisone (CORTEF) 10 MG tablet, Take 3 pills (=30 mg) in AM and 2 pills (=20 mg) at 2 pm daily, additional as directed., Disp: 180 tablet, Rfl: 11     hydrocortisone sodium succinate PF (SOLU-CORTEF) 100 MG injection, Inject 2 mLs (100 mg) into the muscle once for 1 dose Use in emergency situations as discussed in clinic., Disp: 2 mL, Rfl: 1     levothyroxine (SYNTHROID/LEVOTHROID) 112 MCG tablet, Take 112 mcg by mouth daily, Disp: , Rfl:      LORazepam (ATIVAN) 1 MG tablet, Take 1 tablet (1 mg) by mouth every 6 hours as needed for nausea, Disp: 60 tablet, Rfl: 1     melatonin 3 MG CAPS, Take 1 capsule by mouth At Bedtime, Disp: 30 capsule, Rfl: 11     mitotane (LYSODREN) 500 MG tablet CHEMO, Take 3 tablets (1,500 mg) by mouth 4 times daily (Patient taking differently: Take 2,000 mg by mouth 3 times daily ), Disp: 360 tablet, Rfl: 0     Multiple Vitamins-Calcium (ONE-A-DAY WOMENS PO), Take 2 tablets by mouth every morning , Disp: , Rfl:      pantoprazole (PROTONIX) 40 MG EC tablet, Take 1 tablet (40 mg) by mouth daily (Patient taking differently: Take 40 mg by mouth daily as needed ), Disp: 30 tablet, Rfl: 0     prochlorperazine (COMPAZINE) 10 MG tablet, Take 1 tablet (10 mg) by mouth every 6 hours as needed for nausea or vomiting, Disp: 90 tablet, Rfl: 1     triamcinolone (KENALOG) 0.1 % lotion, Apply topically 3 times daily To rash on chest and shoudler, Disp: 60 mL, Rfl: 1  Iron supplement daily for 6 months     History reviewed. No pertinent family history.    Social History   with 2 children, 7 and 10-year-old. She denies smoking, drinking alcohol or using illicit drugs. Occupation: disabled.     Review of Systems   Systemic:             As above   Eye:                      No eye symptoms   Saira-Laryngeal:     occasional dysphagia, no hoarseness, no  "cough     Breast:                  No breast symptoms  Cardiovascular:    No cardiovascular symptoms, no CP or palpitations   Pulmonary:           SOB when anxious and with exertion   Gastrointestinal:  As above   Genitourinary:       increased thirst; increased urination - urinates 1-2 times a night since having the kids  Endocrine:            Sometimes feet feel cold  Neurological:        No neurological symptoms, no headaches, no tremor, no numbness or tingling sensation, no dizziness   Musculoskeletal:  Lower extremities muscle pain, mainly in her thighs, since starting tx with mitotane   Skin:                     dry skin, hair falling out   Psychological:     Lots of anxiety and depression; not suicidal               Vital Signs     Previous Weights:    Wt Readings from Last 10 Encounters:   02/11/19 71.3 kg (157 lb 1.6 oz)   01/09/19 71.8 kg (158 lb 6.4 oz)   01/08/19 70.3 kg (155 lb)   11/27/18 70.8 kg (156 lb)   11/26/18 71.3 kg (157 lb 3.2 oz)   10/23/18 71 kg (156 lb 8 oz)   10/18/18 70.6 kg (155 lb 9.6 oz)   10/03/18 70.8 kg (156 lb)   09/26/18 70.3 kg (155 lb)   09/19/18 71.7 kg (158 lb 1.1 oz)        /77   Pulse 97   Ht 1.626 m (5' 4\")   Wt 71.3 kg (157 lb 1.6 oz)   BMI 26.97 kg/m     Supine /77 pulse 83   Standing 2 minutes /77, pulse 97     Physical Exam  General Appearance: pale, sad looking, well developed, well nourished  Eyes:  conjutivae and extra-ocular motions are normal.                                    pupils round and reactive to light, no lid lag, no stare    HEENT:   oropharynx clear and moist, no JVD, no bruits      no thyromegaly, no palpable nodules   Cardiovascular:  regular rhythm, no murmurs, distal pulse palpable, no edema  Respiratory:        chest clear, no rales, no rhonchi   Gastrointestinal:  abdomen soft, non-tender, non-distended, normal bowel sounds,    no organomegaly  Musculoskeletal:  normal tone and strength  Psychological:          affect and " judgment normal  Skin:  narrow striae on the lateral abdominal flanks   Neurological:  reflexes symmetric and overactive, no resting tremor.     Lab Results  I reviewed prior lab results documented in Epic.  TSH   Date Value Ref Range Status   12/03/2018 2.3 mcU/mL Final     Assessment     1. 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 Ki-67 revealed an increased proliferation index of approximately 20%.   The tumor was functional and presented with high cortisol and DHEAS levels.  Post surgery/radiation and while undergoing treatment with mitotane, the DHEAS has remained undetectable. Most recent CT identified 2 discrete lung nodules.     The patient has been treated with mitotane since July 2018.  Currently, I suspect most of her symptoms are side effects from mitotane.  The fact that the symptoms completely resolved during the drug holiday further supports this statement.  Plan:  Follow-up CT scheduled today  Dose of mitotane to be adjusted based on the CT results and oncology input.  Follow-up androstenedione as a potential marker for recurrence  Try to evaluate for cortisol secretion by checking 24-hour urinary cortisol.  Alternatively, we can check morning cortisol level prior to taking the morning dose of hydrocortisone, with the caveat that mitotane can increase CBG levels and cause a falsely high result.     2.  Primary adrenal insufficiency, mitotane-induced    She definitely has hydrocortisone deficiency.  Unclear at this point whether or not the aldosterone secretion is affected by mitotane.  Her electrolytes have remained stable but she does have some orthostatic tachycardia, which can be multifactorial in etiology.    Recommendations:   Continue to take a higher dose of hydrocortisone of 30 mg in the morning and 20 mg at 2:30 PM, in view of the accelerated hydrocortisone metabolism induced by  mitotane.  Check morning BMP, ACTH, aldosterone and renin levels  F/up urinary cortisol     3. Hypothyroidism, presumed to be secondary to mitotane induced suppression of TSH   The patient has been on 112 mcg levothyroxine replacement for over 6 weeks.  She takes the medication at bedtime.  Clinically, she does have overactive reflexes.  Plan:  Check TFTs today and adjust the dose of levothyroxine accordingly  Advised the patient to take the levothyroxine on empty stomach, with water,  from all the other supplements/multivitamins for at least 4 hours.    4.  Hair loss, anemia  Prior ferritin levels have been very low.  Expect to improve since at the insertion of the IUD.  Follow-up ferritin level with her next lab work.    Orders Placed This Encounter   Procedures     Adrenal corticotropin     Basic metabolic panel     Renin activity     Androstenedione     Cortisol free urine     Creatinine timed urine     TSH     T4 free     T3 Free       Total of 40 minutes was spend in reviewing chart, prior notes, imaging and labs.     Again, thank you for allowing me to participate in the care of your patient.      Sincerely,    Veena Jaquez MD

## 2019-02-12 ENCOUNTER — ONCOLOGY VISIT (OUTPATIENT)
Dept: ONCOLOGY | Facility: CLINIC | Age: 34
End: 2019-02-12
Attending: INTERNAL MEDICINE
Payer: COMMERCIAL

## 2019-02-12 ENCOUNTER — RECORDS - HEALTHEAST (OUTPATIENT)
Dept: ADMINISTRATIVE | Facility: OTHER | Age: 34
End: 2019-02-12

## 2019-02-12 VITALS
TEMPERATURE: 97.4 F | WEIGHT: 158.1 LBS | DIASTOLIC BLOOD PRESSURE: 69 MMHG | OXYGEN SATURATION: 99 % | HEART RATE: 96 BPM | SYSTOLIC BLOOD PRESSURE: 113 MMHG | BODY MASS INDEX: 27.14 KG/M2

## 2019-02-12 DIAGNOSIS — C74.01 MALIGNANT NEOPLASM OF CORTEX OF RIGHT ADRENAL GLAND (H): Primary | ICD-10-CM

## 2019-02-12 DIAGNOSIS — C74.01 ADRENAL CORTEX CANCER, RIGHT (H): ICD-10-CM

## 2019-02-12 LAB
ANION GAP SERPL CALCULATED.3IONS-SCNC: 6 MMOL/L (ref 3–14)
BUN SERPL-MCNC: 7 MG/DL (ref 7–30)
CALCIUM SERPL-MCNC: 7.7 MG/DL (ref 8.5–10.1)
CHLORIDE SERPL-SCNC: 104 MMOL/L (ref 94–109)
CO2 SERPL-SCNC: 28 MMOL/L (ref 20–32)
CREAT SERPL-MCNC: 0.7 MG/DL (ref 0.52–1.04)
GFR SERPL CREATININE-BSD FRML MDRD: >90 ML/MIN/{1.73_M2}
GLUCOSE SERPL-MCNC: 81 MG/DL (ref 70–99)
MISCELLANEOUS TEST: NORMAL
POTASSIUM SERPL-SCNC: 3.6 MMOL/L (ref 3.4–5.3)
SODIUM SERPL-SCNC: 138 MMOL/L (ref 133–144)
T3FREE SERPL-MCNC: 2.2 PG/ML (ref 2.3–4.2)
T4 FREE SERPL-MCNC: 0.82 NG/DL (ref 0.76–1.46)
TSH SERPL DL<=0.005 MIU/L-ACNC: 0.76 MU/L (ref 0.4–4)

## 2019-02-12 PROCEDURE — 36415 COLL VENOUS BLD VENIPUNCTURE: CPT | Performed by: INTERNAL MEDICINE

## 2019-02-12 PROCEDURE — 84481 FREE ASSAY (FT-3): CPT | Performed by: INTERNAL MEDICINE

## 2019-02-12 PROCEDURE — 82088 ASSAY OF ALDOSTERONE: CPT | Performed by: INTERNAL MEDICINE

## 2019-02-12 PROCEDURE — 82040 ASSAY OF SERUM ALBUMIN: CPT | Performed by: INTERNAL MEDICINE

## 2019-02-12 PROCEDURE — 82024 ASSAY OF ACTH: CPT | Performed by: INTERNAL MEDICINE

## 2019-02-12 PROCEDURE — 99215 OFFICE O/P EST HI 40 MIN: CPT | Mod: ZP | Performed by: INTERNAL MEDICINE

## 2019-02-12 PROCEDURE — 82157 ASSAY OF ANDROSTENEDIONE: CPT | Mod: 90 | Performed by: INTERNAL MEDICINE

## 2019-02-12 PROCEDURE — 84443 ASSAY THYROID STIM HORMONE: CPT | Performed by: INTERNAL MEDICINE

## 2019-02-12 PROCEDURE — 99000 SPECIMEN HANDLING OFFICE-LAB: CPT | Performed by: INTERNAL MEDICINE

## 2019-02-12 PROCEDURE — 80048 BASIC METABOLIC PNL TOTAL CA: CPT | Performed by: INTERNAL MEDICINE

## 2019-02-12 PROCEDURE — G0463 HOSPITAL OUTPT CLINIC VISIT: HCPCS | Mod: ZF

## 2019-02-12 PROCEDURE — 84439 ASSAY OF FREE THYROXINE: CPT | Performed by: INTERNAL MEDICINE

## 2019-02-12 PROCEDURE — 84244 ASSAY OF RENIN: CPT | Mod: 90 | Performed by: INTERNAL MEDICINE

## 2019-02-12 ASSESSMENT — PAIN SCALES - GENERAL: PAINLEVEL: NO PAIN (0)

## 2019-02-12 NOTE — PROGRESS NOTES
"MEDICAL ONCOLOGY CLINIC NOTE    REFERRING PROVIDER: Warren Mansfield MD from [facility] Urologic Oncology clinic.     REASON FOR CURRENT VISIT: Follow-up while on mitotane as adjuvant therapy of adrenocortical carcinoma.      HISTORY OF PRESENT ILLNESS: Ms. 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 Her oncologic history is detailed as under.     Suzy is looking better overall.  Since last seen, she has been to [facility].  She was off of mitotane for the last 2 weeks of January.  She did resume mitotane thousand milligrams twice daily at the end of January and has still continued to have very poor tolerance overall with persistent moderate to severe nausea with each dose with approximately 1 episode of emesis every day.  This is despite Compazine pretreatment.      She also established care with Dr. Veena Jaquez in our endocrinology department and has felt confident with her care. No falls or symptoms of recurrent adrenal insufficiency.  Has also met with Ector Marcelino, our psychologist, and is feeling better now.  I have also reached out to Dr. Huertas, her endocrine oncologist in the [facility], who agreed with the decision to treat based on tolerance rather than achieving a certain mitotane trough.       No clinical evidence of disease recurrence.    ONCOLOGIC HISTORY:   1. 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   - CT abdomen and pelvis stone protocol without contrast 5 5085 showed \"9.2 x 8 cm heterogeneous right upper quadrant mass with small foci of calcification within it which is likely arising from the adrenal gland though inseparable from liver and upper pole of right kidney. It is " "incompletely evaluated on this noncontrast study. 3 x 2.6 cm mass right lobe of liver on image #85 also incompletely evaluated. 6 x 4 x 4 mm upper third left ureteral obstructing stone with mild to moderate secondary hydronephrosis. Collection of stones at lower pole left kidney extending over an area of approximately 6 x 7 x 4 mm. Additional nonobstructing 1 mm stone upper pole left kidney. 2 mm nonobstructing stone noted upper pole right kidney with no hydronephrosis on the right. Postop change consistent with gastric bypass. Noncontrast images of spleen, left adrenal gland, gallbladder, and pancreas unremarkable. No aneurysm. No adenopathy.\"  - Seen by Dr. Delvalle at [facility] Urology clinic. Referred to Dr. Alvino Patel and then Dr. Warren Mansfield.  - Endocrinology team directed workup showed high DHEAS level of 740 pre-surgery.   - Right open adrenalectomy and hepatic mobilization performed by Dr. Warren Mansfield on 6/25/2018. Pathology showed adrenocortical carcinoma measuring 11.3 cm, weighing 413 g with extension into or through the adrenal capsule negative lymphovascular invasion, tumor necrosis present, margins involved by tumor, no regional lymph nodes identified, pathologic stage T2 NX.  - DHEAS normalized postsurgery.   - Adjuvant Mitotane started on 7/24/18. Dose increased to 2000mg TID around 10/23/18 due to persistently low troughs. Held 1/16/19 for two weeks and resumed 1/30 at 1000 mg po BID due to very poor tolerance of higher doses. Highest mitotane level was 10.2 on 2/11/19.  - Saw radiation oncology at [facility], radiation oncology at [facility], as well as endocrine oncology (Dr. Huertas) at [facility].   - S/p adjuvant RT by Dr. Monsivais - 5040 cGy over 30 fr (8/24/18-10/6/18).  - 2/11/19: CT C/A/P and MRI brain with contrast - no evidence of disease recurrence.    REVIEW OF SYSTEMS: 14 point ROS negative other than the symptoms noted above in the " HPI.    PAST MEDICAL AND SURGICAL HISTORY: Reviewed today  1. Right-sided adrenocortical carcinoma.  2. MHTFR mutation with h.o mutiple miscarriages.  3. Ovarian cysts diagnosed in 2011.  4. H/o gastric bypass in 2016 for obesity.    SOCIAL HISTORY:   Social History     Tobacco Use     Smoking status: Never Smoker     Smokeless tobacco: Never Used   Substance Use Topics     Alcohol use: Yes     Comment: occ.     Drug use: No     FAMILY HISTORY:   Reviewed and non-contributory to current admission.      ALLERGIES:   Allergies   Allergen Reactions     Bee Venom Swelling     Ibuprofen Hives and Swelling     CURRENT MEDICATIONS:   Current Outpatient Medications:      acetaminophen (TYLENOL) 325 MG tablet, Take 2 tablets (650 mg) by mouth every 4 hours as needed for other (multimodal surgical pain management along with NSAIDS and opioid medication as indicated based on pain control and physical function.), Disp: 150 tablet, Rfl: 0     buPROPion (WELLBUTRIN) 100 MG tablet, TAKE 1 TABLET (100 MG TOTAL) BY MOUTH 2 (TWO) TIMES A DAY., Disp: , Rfl: 3     calcium carbonate antacid 1000 MG CHEW, Take 1 tablet by mouth every morning , Disp: , Rfl:      cholecalciferol (VITAMIN D-1000 MAX ST) 1000 units TABS, Take 2,000 Units by mouth every morning , Disp: , Rfl:      diazepam (VALIUM) 5 MG tablet, Take 1 tablet (5 mg) by mouth once as needed for anxiety (MRI claustrophobia management), Disp: 2 tablet, Rfl: 3     DRONABINOL PO, by Other route every other day Vape, Disp: , Rfl:      EPINEPHrine (EPIPEN/ADRENACLICK/OR ANY BX GENERIC EQUIV) 0.3 MG/0.3ML injection 2-pack, As directed for bee stings, Disp: , Rfl:      ferrous sulfate (IRON) 325 (65 Fe) MG tablet, Take 325 mg by mouth daily, Disp: , Rfl:      FLUoxetine (PROZAC) 40 MG capsule, Take 40 mg by mouth daily, Disp: , Rfl:      gabapentin (NEURONTIN) 300 MG capsule, Take 300 mg by mouth 3 times daily, Disp: , Rfl:      hydrocortisone (CORTEF) 10 MG tablet, Take 3 pills (=30  mg) in AM and 2 pills (=20 mg) at 2 pm daily, additional as directed., Disp: 180 tablet, Rfl: 11     hydrocortisone sodium succinate PF (SOLU-CORTEF) 100 MG injection, Inject 2 mLs (100 mg) into the muscle once for 1 dose Use in emergency situations as discussed in clinic., Disp: 2 mL, Rfl: 1     levothyroxine (SYNTHROID/LEVOTHROID) 112 MCG tablet, Take 112 mcg by mouth daily, Disp: , Rfl:      LORazepam (ATIVAN) 1 MG tablet, Take 1 tablet (1 mg) by mouth every 6 hours as needed for nausea, Disp: 60 tablet, Rfl: 1     melatonin 3 MG CAPS, Take 1 capsule by mouth At Bedtime, Disp: 30 capsule, Rfl: 11     mitotane (LYSODREN) 500 MG tablet CHEMO, Take 3 tablets (1,500 mg) by mouth 4 times daily (Patient taking differently: Take 2,000 mg by mouth 3 times daily ), Disp: 360 tablet, Rfl: 0     Multiple Vitamins-Calcium (ONE-A-DAY WOMENS PO), Take 2 tablets by mouth every morning , Disp: , Rfl:      pantoprazole (PROTONIX) 40 MG EC tablet, Take 1 tablet (40 mg) by mouth daily (Patient taking differently: Take 40 mg by mouth daily as needed ), Disp: 30 tablet, Rfl: 0     prochlorperazine (COMPAZINE) 10 MG tablet, Take 1 tablet (10 mg) by mouth every 6 hours as needed for nausea or vomiting, Disp: 90 tablet, Rfl: 1     triamcinolone (KENALOG) 0.1 % lotion, Apply topically 3 times daily To rash on chest and shoudler, Disp: 60 mL, Rfl: 1  No current facility-administered medications for this visit.     PHYSICAL EXAMINATION:  Vital signs: /69   Pulse 96   Temp 97.4  F (36.3  C) (Oral)   Wt 71.7 kg (158 lb 1.6 oz)   SpO2 99%   BMI 27.14 kg/m    ECOG performance status of 1.   GENERAL: Young lady in chair, in NAD  HEENT: No icterus, no pallor. MMM, PERRL, OP clear.   NECK: Supple, no JVD/LAD.  LUNGS: CTAB, normal WOB on RA   CARDIOVASCULAR: Regular rate and rhythm, no murmurs, gallops or rubs, normal S1/S2.   ABDOMEN: Well-healing abdominal incision without fluctuance or exudate. Soft, NT, ND, no masses appreciated,  normal BS.   EXTREMITIES: No cyanosis, no clubbing, no edema.   NEUROLOGIC: No focal deficits, CN 2-12 intact. Linear thought process.    LYMPH NODE EXAM: No palpable adenopathy - cervical, supraclavicular.      LABORATORY DATA:   Lab Test 02/12/19  0949 01/09/19  1018 01/08/19  1617 12/03/18 11/27/18  1349   WBC  --  4.4 9.2  --  4.7   RBC  --  4.68 4.19  --  4.17   HGB  --  9.7* 9.2*  --  8.5*   HCT  --  34.9* 31.4*  --  30.1*   MCV  --  75* 75*  --  72*   MCH  --  20.7* 22.0*  --  20.4*   MCHC  --  27.8* 29.3*  --  28.2*   RDW  --  18.1* 17.6*  --  17.7*   PLT  --  229 236  --  264   NEUTROPHIL  --  86.1 89.9  --  79.9    136 138 141 140   POTASSIUM 3.6 3.5 4.2 4.7 4.4   CHLORIDE 104 102 106  --  108   CO2 28 27 25  --  27   ANIONGAP 6 7 7  --  5   GLC 81 84 88  --  82   BUN 7 7 10  --  7   CR 0.70 0.60 0.59  --  0.61   SHASHA 7.7* 8.1* 7.3* 8.6 7.9*   PROTTOTAL  --  6.2* 5.7*  --  6.3*   ALBUMIN  --  3.0* 3.0*  --  3.2*   BILITOTAL  --  0.3 0.3  --  0.2   ALKPHOS  --  182* 161* 167* 157*   AST  --  57* 51* 34 28   ALT  --  55* 49  --  24   DHEAS was 740 on 6/5/18, and has been <15 on 7/12/18, 8/20/18, 9/19/18, 10/23/18, 11/27/18, 1/9/19.  Mitotane level (target 14-20): 1.8ng/dl on 8/20/18, 3.5 on 10/25/18, 6.2 on 12/4/18, 8.1 on 1/9/19 and 10.2 on 2/11/19.    IMAGING STUDIES:  As above.    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     Adrenocortical ca:  - Started on adjuvant mitotane in July 2018 based on her presentation and tumor characteristics including invasion of the adrenal capsule, high mitotic rate, possibly positive margins, and high Ki67, which could result in a rate of recurrence as high as 40%.    - Restaging CT results from 2/11/19 show no evidence of disease recurrence. No evidence of clinical or biochemical recurrence either.   - Patient tolerating adjuvant mitotane ok overall and will continue  "2000mg TID for now until repeat levels come back.  - Mitotane level ([facility] lab #[id number]) continues to be subtherapeutic ~9 months since starting therapy as the goal Mitotane levels are 14-20.   - Despite multiple supportive care interventions, patient has NOT been able to tolerate higher doses of mitotane. Even at the current dose level (1000mg BID), she has moderate-severe nausea and emesis x1 daily.   - We've had numerous conversations regarding goals of therapy and that mitotane doesn't work on an \"all or none\" basis with no activity below therapeutic level. In her case, it is critical to not aim for a number but to actually aim for optimizing tolerance to therapy to allow continuation for another 4.5 years. Suzy and her  are in agreement.   - Plan to increase mitotane to 1000mg TID for another 4 weeks. No recheck of levels in the interim as it will not affect management.  - Continue Compazine PRN for nausea. We have tried multiple meds before (see below) and this is the one that's most effective.  - Pt slated to see Dr. Huertas in 2 weeks - appreciate his assistance.   - Next restaging CT ordered in 3 months.  - Plan to continue adjuvant Mitotane for up to 5 yrs if tolerated.    Concern for ativan use disorder:  - Suzy has been dealing with tremendous stress over the past few months and was using Ativan HS almost daily for anxiety and nausea.   - I counseled her of the addictive potential of this drug and she did agree that she has felt increasingly desiring this medication.   - We have stopped Ativan and I would encourage other medical providers to not give any further refills.  - Discussed health coping strategies including cognitive behavioral interventions. Seen by our psychology team as well.     Endocrinopathies:  - Mgmt per endocrine team at the .     Iron deficiency anemia:  - MCV and iron panel suggests iron deficiency, she has heavy periods and no other obvious sources of blood loss.  - Pt on " FeSO4 every day and Hb is improving. Monitor.   - She is planning to get a progesterone-only or inert IUD to reduce menstrual blood loss soon. Advised on the increased risk of DVTs with estrogen containing preparations due to MHTFR mutation with prior history of miscarriages.     Bilateral ovarian cysts:  - Noted on CT scan and stable for past 6 months. Pt reports a long-standing history of this issue.  - Patient wants to contact PCP for further mgmt including an ultrasound and Gyn referral.    Return to see me in 4 weeks with labs. Patient and family given opportunity to ask questions, which were answered to their satisfaction. They're in complete agreement as planned.    BILLIN.    Benson Cintron M.D.  Maryt. Professor of Medicine  Genitourinary Oncology  Division of Hematology, Oncology & Transplantation  [facility]    CC: Edwin Huertas MD   [facility], [facility]

## 2019-02-12 NOTE — DISCHARGE INSTRUCTIONS

## 2019-02-12 NOTE — LETTER
2/12/2019       RE: Suzy Rodríguez  [address]  [facility] [address]     Dear Colleague,    Thank you for referring your patient, Suzy Rodríguez, to the [facility]. Please see a copy of my visit note below.    MEDICAL ONCOLOGY CLINIC NOTE    REFERRING PROVIDER: Warren Mansfield MD from [facility] Urologic Oncology clinic.     REASON FOR CURRENT VISIT: Follow-up while on mitotane as adjuvant therapy of adrenocortical carcinoma.      HISTORY OF PRESENT ILLNESS: Ms. 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 Her oncologic history is detailed as under.     Suzy is looking better overall.  Since last seen, she has been to [facility].  She was off of mitotane for the last 2 weeks of January.  She did resume mitotane thousand milligrams twice daily at the end of January and has still continued to have very poor tolerance overall with persistent moderate to severe nausea with each dose with approximately 1 episode of emesis every day.  This is despite Compazine pretreatment.      She also established care with Dr. Veena Jaquez in our endocrinology department and has felt confident with her care. No falls or symptoms of recurrent adrenal insufficiency.  Has also met with Ector Marcelino, our psychologist, and is feeling better now.  I have also reached out to Dr. Huertas, her endocrine oncologist in the [facility], who agreed with the decision to treat based on tolerance rather than achieving a certain mitotane trough.       No clinical evidence of disease recurrence.    ONCOLOGIC HISTORY:   1. 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   - CT abdomen and pelvis stone protocol without  "contrast 5 8018 showed \"9.2 x 8 cm heterogeneous right upper quadrant mass with small foci of calcification within it which is likely arising from the adrenal gland though inseparable from liver and upper pole of right kidney. It is incompletely evaluated on this noncontrast study. 3 x 2.6 cm mass right lobe of liver on image #85 also incompletely evaluated. 6 x 4 x 4 mm upper third left ureteral obstructing stone with mild to moderate secondary hydronephrosis. Collection of stones at lower pole left kidney extending over an area of approximately 6 x 7 x 4 mm. Additional nonobstructing 1 mm stone upper pole left kidney. 2 mm nonobstructing stone noted upper pole right kidney with no hydronephrosis on the right. Postop change consistent with gastric bypass. Noncontrast images of spleen, left adrenal gland, gallbladder, and pancreas unremarkable. No aneurysm. No adenopathy.\"  - Seen by Dr. Delvalle at [facility] Urology clinic. Referred to Dr. Alvino Patel and then Dr. Warren Mansfield.  - Endocrinology team directed workup showed high DHEAS level of 740 pre-surgery.   - Right open adrenalectomy and hepatic mobilization performed by Dr. Warren Mansfield on 6/25/2018. Pathology showed adrenocortical carcinoma measuring 11.3 cm, weighing 413 g with extension into or through the adrenal capsule negative lymphovascular invasion, tumor necrosis present, margins involved by tumor, no regional lymph nodes identified, pathologic stage T2 NX.  - DHEAS normalized postsurgery.   - Adjuvant Mitotane started on 7/24/18. Dose increased to 2000mg TID around 10/23/18 due to persistently low troughs. Held 1/16/19 for two weeks and resumed 1/30 at 1000 mg po BID due to very poor tolerance of higher doses. Highest mitotane level was 10.2 on 2/11/19.  - Saw radiation oncology at [facility], radiation oncology at [facility], as well as endocrine oncology (Dr. Huertas) at [facility].   - S/p " adjuvant RT by Dr. Monsivais - 5040 cGy over 30 fr (8/24/18-10/6/18).  - 2/11/19: CT C/A/P and MRI brain with contrast - no evidence of disease recurrence.    REVIEW OF SYSTEMS: 14 point ROS negative other than the symptoms noted above in the HPI.    PAST MEDICAL AND SURGICAL HISTORY: Reviewed today  1. Right-sided adrenocortical carcinoma.  2. MHTFR mutation with h.o mutiple miscarriages.  3. Ovarian cysts diagnosed in 2011.  4. H/o gastric bypass in 2016 for obesity.    SOCIAL HISTORY:   Social History     Tobacco Use     Smoking status: Never Smoker     Smokeless tobacco: Never Used   Substance Use Topics     Alcohol use: Yes     Comment: occ.     Drug use: No     FAMILY HISTORY:   Reviewed and non-contributory to current admission.      ALLERGIES:   Allergies   Allergen Reactions     Bee Venom Swelling     Ibuprofen Hives and Swelling     CURRENT MEDICATIONS:   Current Outpatient Medications:      acetaminophen (TYLENOL) 325 MG tablet, Take 2 tablets (650 mg) by mouth every 4 hours as needed for other (multimodal surgical pain management along with NSAIDS and opioid medication as indicated based on pain control and physical function.), Disp: 150 tablet, Rfl: 0     buPROPion (WELLBUTRIN) 100 MG tablet, TAKE 1 TABLET (100 MG TOTAL) BY MOUTH 2 (TWO) TIMES A DAY., Disp: , Rfl: 3     calcium carbonate antacid 1000 MG CHEW, Take 1 tablet by mouth every morning , Disp: , Rfl:      cholecalciferol (VITAMIN D-1000 MAX ST) 1000 units TABS, Take 2,000 Units by mouth every morning , Disp: , Rfl:      diazepam (VALIUM) 5 MG tablet, Take 1 tablet (5 mg) by mouth once as needed for anxiety (MRI claustrophobia management), Disp: 2 tablet, Rfl: 3     DRONABINOL PO, by Other route every other day Vape, Disp: , Rfl:      EPINEPHrine (EPIPEN/ADRENACLICK/OR ANY BX GENERIC EQUIV) 0.3 MG/0.3ML injection 2-pack, As directed for bee stings, Disp: , Rfl:      ferrous sulfate (IRON) 325 (65 Fe) MG tablet, Take 325 mg by mouth daily, Disp: ,  Rfl:      FLUoxetine (PROZAC) 40 MG capsule, Take 40 mg by mouth daily, Disp: , Rfl:      gabapentin (NEURONTIN) 300 MG capsule, Take 300 mg by mouth 3 times daily, Disp: , Rfl:      hydrocortisone (CORTEF) 10 MG tablet, Take 3 pills (=30 mg) in AM and 2 pills (=20 mg) at 2 pm daily, additional as directed., Disp: 180 tablet, Rfl: 11     hydrocortisone sodium succinate PF (SOLU-CORTEF) 100 MG injection, Inject 2 mLs (100 mg) into the muscle once for 1 dose Use in emergency situations as discussed in clinic., Disp: 2 mL, Rfl: 1     levothyroxine (SYNTHROID/LEVOTHROID) 112 MCG tablet, Take 112 mcg by mouth daily, Disp: , Rfl:      LORazepam (ATIVAN) 1 MG tablet, Take 1 tablet (1 mg) by mouth every 6 hours as needed for nausea, Disp: 60 tablet, Rfl: 1     melatonin 3 MG CAPS, Take 1 capsule by mouth At Bedtime, Disp: 30 capsule, Rfl: 11     mitotane (LYSODREN) 500 MG tablet CHEMO, Take 3 tablets (1,500 mg) by mouth 4 times daily (Patient taking differently: Take 2,000 mg by mouth 3 times daily ), Disp: 360 tablet, Rfl: 0     Multiple Vitamins-Calcium (ONE-A-DAY WOMENS PO), Take 2 tablets by mouth every morning , Disp: , Rfl:      pantoprazole (PROTONIX) 40 MG EC tablet, Take 1 tablet (40 mg) by mouth daily (Patient taking differently: Take 40 mg by mouth daily as needed ), Disp: 30 tablet, Rfl: 0     prochlorperazine (COMPAZINE) 10 MG tablet, Take 1 tablet (10 mg) by mouth every 6 hours as needed for nausea or vomiting, Disp: 90 tablet, Rfl: 1     triamcinolone (KENALOG) 0.1 % lotion, Apply topically 3 times daily To rash on chest and shoudler, Disp: 60 mL, Rfl: 1  No current facility-administered medications for this visit.     PHYSICAL EXAMINATION:  Vital signs: /69   Pulse 96   Temp 97.4  F (36.3  C) (Oral)   Wt 71.7 kg (158 lb 1.6 oz)   SpO2 99%   BMI 27.14 kg/m     ECOG performance status of 1.   GENERAL: Young lady in chair, in NAD  HEENT: No icterus, no pallor. MMM, PERRL, OP clear.   NECK: Supple, no  JVD/LAD.  LUNGS: CTAB, normal WOB on RA   CARDIOVASCULAR: Regular rate and rhythm, no murmurs, gallops or rubs, normal S1/S2.   ABDOMEN: Well-healing abdominal incision without fluctuance or exudate. Soft, NT, ND, no masses appreciated, normal BS.   EXTREMITIES: No cyanosis, no clubbing, no edema.   NEUROLOGIC: No focal deficits, CN 2-12 intact. Linear thought process.    LYMPH NODE EXAM: No palpable adenopathy - cervical, supraclavicular.      LABORATORY DATA:   Lab Test 02/12/19  0949 01/09/19  1018 01/08/19  1617 12/03/18 11/27/18  1349   WBC  --  4.4 9.2  --  4.7   RBC  --  4.68 4.19  --  4.17   HGB  --  9.7* 9.2*  --  8.5*   HCT  --  34.9* 31.4*  --  30.1*   MCV  --  75* 75*  --  72*   MCH  --  20.7* 22.0*  --  20.4*   MCHC  --  27.8* 29.3*  --  28.2*   RDW  --  18.1* 17.6*  --  17.7*   PLT  --  229 236  --  264   NEUTROPHIL  --  86.1 89.9  --  79.9    136 138 141 140   POTASSIUM 3.6 3.5 4.2 4.7 4.4   CHLORIDE 104 102 106  --  108   CO2 28 27 25  --  27   ANIONGAP 6 7 7  --  5   GLC 81 84 88  --  82   BUN 7 7 10  --  7   CR 0.70 0.60 0.59  --  0.61   SHASHA 7.7* 8.1* 7.3* 8.6 7.9*   PROTTOTAL  --  6.2* 5.7*  --  6.3*   ALBUMIN  --  3.0* 3.0*  --  3.2*   BILITOTAL  --  0.3 0.3  --  0.2   ALKPHOS  --  182* 161* 167* 157*   AST  --  57* 51* 34 28   ALT  --  55* 49  --  24   DHEAS was 740 on 6/5/18, and has been <15 on 7/12/18, 8/20/18, 9/19/18, 10/23/18, 11/27/18, 1/9/19.  Mitotane level (target 14-20): 1.8ng/dl on 8/20/18, 3.5 on 10/25/18, 6.2 on 12/4/18, 8.1 on 1/9/19 and 10.2 on 2/11/19.    IMAGING STUDIES:  As above.    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     Adrenocortical ca:  - Started on adjuvant mitotane in July 2018 based on her presentation and tumor characteristics including invasion of the adrenal capsule, high mitotic rate, possibly positive margins, and high Ki67, which could result in a rate  "of recurrence as high as 40%.    - Restaging CT results from 2/11/19 show no evidence of disease recurrence. No evidence of clinical or biochemical recurrence either.   - Patient tolerating adjuvant mitotane ok overall and will continue 2000mg TID for now until repeat levels come back.  - Mitotane level ([facility] lab #[id number]) continues to be subtherapeutic ~9 months since starting therapy as the goal Mitotane levels are 14-20.   - Despite multiple supportive care interventions, patient has NOT been able to tolerate higher doses of mitotane. Even at the current dose level (1000mg BID), she has moderate-severe nausea and emesis x1 daily.   - We've had numerous conversations regarding goals of therapy and that mitotane doesn't work on an \"all or none\" basis with no activity below therapeutic level. In her case, it is critical to not aim for a number but to actually aim for optimizing tolerance to therapy to allow continuation for another 4.5 years. Suzy and her  are in agreement.   - Plan to increase mitotane to 1000mg TID for another 4 weeks. No recheck of levels in the interim as it will not affect management.  - Continue Compazine PRN for nausea. We have tried multiple meds before (see below) and this is the one that's most effective.  - Pt slated to see Dr. Huertas in 2 weeks - appreciate his assistance.   - Next restaging CT ordered in 3 months.  - Plan to continue adjuvant Mitotane for up to 5 yrs if tolerated.    Concern for ativan use disorder:  - Suzy has been dealing with tremendous stress over the past few months and was using Ativan HS almost daily for anxiety and nausea.   - I counseled her of the addictive potential of this drug and she did agree that she has felt increasingly desiring this medication.   - We have stopped Ativan and I would encourage other medical providers to not give any further refills.  - Discussed health coping strategies including cognitive behavioral interventions. Seen by " our psychology team as well.     Endocrinopathies:  - Mgmt per endocrine team at the .     Iron deficiency anemia:  - MCV and iron panel suggests iron deficiency, she has heavy periods and no other obvious sources of blood loss.  - Pt on FeSO4 every day and Hb is improving. Monitor.   - She is planning to get a progesterone-only or inert IUD to reduce menstrual blood loss soon. Advised on the increased risk of DVTs with estrogen containing preparations due to MHTFR mutation with prior history of miscarriages.     Bilateral ovarian cysts:  - Noted on CT scan and stable for past 6 months. Pt reports a long-standing history of this issue.  - Patient wants to contact PCP for further mgmt including an ultrasound and Gyn referral.    Return to see me in 4 weeks with labs. Patient and family given opportunity to ask questions, which were answered to their satisfaction. They're in complete agreement as planned.      Benson Cintron M.D.      CC: Edwin Huertas MD   [facility]

## 2019-02-12 NOTE — NURSING NOTE
"Oncology Rooming Note    February 12, 2019 4:20 PM   Suzy Rodríguez is a 33 year old female who presents for:    Chief Complaint   Patient presents with     Oncology Clinic Visit     Adrenal Cotical CA; 6 week f.u     Initial Vitals: /69   Pulse 96   Temp 97.4  F (36.3  C) (Oral)   Wt 71.7 kg (158 lb 1.6 oz)   SpO2 99%   BMI 27.14 kg/m   Estimated body mass index is 27.14 kg/m  as calculated from the following:    Height as of 2/11/19: 1.626 m (5' 4\").    Weight as of this encounter: 71.7 kg (158 lb 1.6 oz). Body surface area is 1.8 meters squared.  No Pain (0) Comment: Data Unavailable   No LMP recorded.  Allergies reviewed: No  Medications reviewed: Yes    Medications: Medication refills not needed today.  Pharmacy name entered into EPIC:    [id number] - [place], [place] - [address] [place] [address] PHARMACY [place], [place] - [address] PHARMACY [place], [place] - [address] [facility] - [place], [place] - [address] [place] [address] 34  [facility] [address] [place] [facility] [address] [place] [address] N    Rhiannon Man CMA                "

## 2019-02-12 NOTE — DISCHARGE INSTRUCTIONS
MRI Contrast Discharge Instructions    The IV contrast you received today will pass out of your body in your  urine. This will happen in the next 24 hours. You will not feel this process.  Your urine will not change color.    Drink at least 4 extra glasses of water or juice today (unless your doctor  has restricted your fluids). This reduces the stress on your kidneys.  You may take your regular medicines.    If you are on dialysis: It is best to have dialysis today.    If you have a reaction: Most reactions happen right away. If you have  any new symptoms after leaving the hospital (such as hives or swelling),  call your hospital at the correct number below. Or call your family doctor.  If you have breathing distress or wheezing, call 911.    Special instructions: ***    I have read and understand the above information.    Signature:______________________________________ Date:___________    Staff:__________________________________________ Date:___________     Time:__________    [place] Radiology Departments:    ___[place]: [phone]  ___[facility]: [phone]  ___[place]: [phone] ___[facility]: [phone]  ___[facility]: [phone]  ___[facility]: [phone]  ___[phone]  ___[facility]: [phone]  ___[phone]

## 2019-02-13 DIAGNOSIS — E27.40 ADRENAL INSUFFICIENCY (H): Primary | ICD-10-CM

## 2019-02-13 LAB
ACTH PLAS-MCNC: 19 PG/ML
ALBUMIN SERPL-MCNC: 3.3 G/DL (ref 3.4–5)

## 2019-02-13 PROCEDURE — 82530 CORTISOL FREE: CPT | Mod: 90 | Performed by: INTERNAL MEDICINE

## 2019-02-13 PROCEDURE — 99000 SPECIMEN HANDLING OFFICE-LAB: CPT | Performed by: INTERNAL MEDICINE

## 2019-02-13 PROCEDURE — 82570 ASSAY OF URINE CREATININE: CPT | Performed by: INTERNAL MEDICINE

## 2019-02-13 PROCEDURE — 81050 URINALYSIS VOLUME MEASURE: CPT | Performed by: INTERNAL MEDICINE

## 2019-02-14 ENCOUNTER — TELEPHONE (OUTPATIENT)
Dept: ENDOCRINOLOGY | Facility: CLINIC | Age: 34
End: 2019-02-14

## 2019-02-14 DIAGNOSIS — E03.8 SECONDARY HYPOTHYROIDISM: Primary | ICD-10-CM

## 2019-02-14 DIAGNOSIS — C74.01 ADRENAL CORTEX CANCER, RIGHT (H): ICD-10-CM

## 2019-02-14 LAB
ANDROST SERPL-MCNC: 0.47 NG/ML (ref 0.26–2.14)
RENIN PLAS-CCNC: 4.1 NG/ML/HR

## 2019-02-14 RX ORDER — LEVOTHYROXINE SODIUM 125 UG/1
125 TABLET ORAL DAILY
Qty: 90 TABLET | Refills: 3 | Status: SHIPPED | OUTPATIENT
Start: 2019-02-14 | End: 2020-02-24

## 2019-02-14 NOTE — TELEPHONE ENCOUNTER
Clinic Action Needed:Yes, please return call    Reason for Call: Moe with the [facility] lab is running a timed creatinine.  He is reporting that duration in hours is 2130 and he thinks that is a mistake, said it would be way too long.  Please return call to discuss further.  Thank you.     Routed to: [facility] Endrocrinology Adult [facility]      Dianna Collazo RN  [facility]

## 2019-02-15 NOTE — TELEPHONE ENCOUNTER
Please verify by calling the patient if she collected the urine over 24 hrs. If she did, they should run the urine specimen.

## 2019-02-16 LAB
ALDOST SERPL-MCNC: 7.7 NG/DL (ref 0–31)
COLLECT DURATION TIME UR: 21.5 H
CREAT 24H UR-MRATE: 1.33 G/(24.H) (ref 0.8–1.8)
CREAT UR-MCNC: 54 MG/DL
SPECIMEN VOL UR: 2200 ML

## 2019-02-18 ENCOUNTER — MYC MEDICAL ADVICE (OUTPATIENT)
Dept: ENDOCRINOLOGY | Facility: CLINIC | Age: 34
End: 2019-02-18

## 2019-02-18 ENCOUNTER — COMMUNICATION - HEALTHEAST (OUTPATIENT)
Dept: FAMILY MEDICINE | Facility: CLINIC | Age: 34
End: 2019-02-18

## 2019-02-18 DIAGNOSIS — E03.8 SECONDARY HYPOTHYROIDISM: ICD-10-CM

## 2019-02-18 DIAGNOSIS — C74.01 MALIGNANT NEOPLASM OF CORTEX OF RIGHT ADRENAL GLAND (H): Primary | ICD-10-CM

## 2019-02-18 DIAGNOSIS — E27.49 SECONDARY ADRENAL INSUFFICIENCY (H): ICD-10-CM

## 2019-02-18 RX ORDER — FLUDROCORTISONE ACETATE 0.1 MG/1
0.05 TABLET ORAL DAILY
Qty: 45 TABLET | Refills: 3 | Status: SHIPPED | OUTPATIENT
Start: 2019-02-18 | End: 2019-12-10

## 2019-02-19 LAB — LAB SCANNED RESULT: NORMAL

## 2019-02-20 ENCOUNTER — TRANSFERRED RECORDS (OUTPATIENT)
Dept: HEALTH INFORMATION MANAGEMENT | Facility: CLINIC | Age: 34
End: 2019-02-20

## 2019-02-20 ENCOUNTER — OFFICE VISIT - HEALTHEAST (OUTPATIENT)
Dept: FAMILY MEDICINE | Facility: CLINIC | Age: 34
End: 2019-02-20

## 2019-02-20 ENCOUNTER — HOSPITAL ENCOUNTER (OUTPATIENT)
Dept: ULTRASOUND IMAGING | Facility: HOSPITAL | Age: 34
Discharge: HOME | End: 2019-02-20
Attending: FAMILY MEDICINE

## 2019-02-20 DIAGNOSIS — F33.1 MODERATE EPISODE OF RECURRENT MAJOR DEPRESSIVE DISORDER (H): ICD-10-CM

## 2019-02-20 DIAGNOSIS — Z30.431 IUD CHECK UP: ICD-10-CM

## 2019-02-20 DIAGNOSIS — N83.202 CYST OF LEFT OVARY: ICD-10-CM

## 2019-02-20 DIAGNOSIS — F41.1 GENERALIZED ANXIETY DISORDER: ICD-10-CM

## 2019-02-20 ASSESSMENT — MIFFLIN-ST. JEOR: SCORE: 1396.67

## 2019-02-21 LAB
COLLECT DURATION TIME SPEC: 2130 H
CORTIS F 24H UR HPLC-MCNC: 59.5 UG/L
CORTIS F 24H UR-MRATE: 130.9 UG/D
CORTIS F/CREAT 24H UR: 114.42 UG/G CRT
CREAT 24H UR-MRATE: 1144 MG/D (ref 700–1600)
CREAT UR-MCNC: 52 MG/DL
IMP & REVIEW OF LAB RESULTS: ABNORMAL
SPECIMEN VOL ?TM UR: 2200 ML

## 2019-02-26 ENCOUNTER — RECORDS - HEALTHEAST (OUTPATIENT)
Dept: ADMINISTRATIVE | Facility: OTHER | Age: 34
End: 2019-02-26

## 2019-02-26 ENCOUNTER — TRANSFERRED RECORDS (OUTPATIENT)
Dept: HEALTH INFORMATION MANAGEMENT | Facility: CLINIC | Age: 34
End: 2019-02-26

## 2019-02-28 ENCOUNTER — TELEPHONE (OUTPATIENT)
Dept: ONCOLOGY | Facility: CLINIC | Age: 34
End: 2019-02-28

## 2019-02-28 ENCOUNTER — RECORDS - HEALTHEAST (OUTPATIENT)
Dept: ADMINISTRATIVE | Facility: OTHER | Age: 34
End: 2019-02-28

## 2019-02-28 ENCOUNTER — OFFICE VISIT (OUTPATIENT)
Dept: RADIATION THERAPY | Facility: OUTPATIENT CENTER | Age: 34
End: 2019-02-28
Payer: COMMERCIAL

## 2019-02-28 VITALS
BODY MASS INDEX: 27.5 KG/M2 | SYSTOLIC BLOOD PRESSURE: 108 MMHG | RESPIRATION RATE: 16 BRPM | WEIGHT: 160.2 LBS | HEART RATE: 96 BPM | DIASTOLIC BLOOD PRESSURE: 70 MMHG

## 2019-02-28 DIAGNOSIS — C74.01 MALIGNANT NEOPLASM OF CORTEX OF RIGHT ADRENAL GLAND (H): Primary | ICD-10-CM

## 2019-02-28 ASSESSMENT — PAIN SCALES - GENERAL: PAINLEVEL: NO PAIN (0)

## 2019-02-28 NOTE — NURSING NOTE
FOLLOW-UP VISIT    Patient Name: Suzy Rodríguez      : 1985     Age: 33 year old        ______________________________________________________________________________     Chief Complaint   Patient presents with     Radiation Therapy     Return visit     /70 (BP Location: Left arm, Cuff Size: Adult Large)   Pulse 96   Resp 16   Wt 72.7 kg (160 lb 3.2 oz)   BMI 27.50 kg/m       Pain  Denies    Meds  Current Med List Reviewed: Yes    Imaging  Recent MRI brain 19    On Chemo?: Yes, oral mitotane  Nausea:daily n//v, trying to manage with medical marijuana, compazine, ativan. Dietary changes helpful.    Bowel: constipated  Energy Level: very fatigued, having issues with slow speech, loss of fine motor skills, loss of concentration, due to chemo drug    Other Appointments:     Date  Oncologist: Abdulaziz/Jose Manuel Appointment Date:    Surgeon: Appointment Date:     Other Notes: sees Dr. Edwin Huertas MD at the [facility] - f/u 6 months

## 2019-02-28 NOTE — LETTER
2019      RE: Suzy Rodríguez  [address]  [facility] [address]          Department of Radiation Oncology  Radiation Therapy Center  [facility]  [place], [place] [address] [phone]       Radiation Oncology Follow-up Visit  2019      Suzy Rodríguez  MRN: [id number]   : 1985     DIAGNOSIS: Low grade adrenal cortical carcinoma, right adrenal gland  INTENT OF RADIOTHERAPY: Curative  PATHOLOGY:    Low grade adrenal cortical carcinoma,  11.3 cm, necrosis and invasion into the adrenal capsule, + margins, tumot was less than 25% clear cells and a mitotic rate of 15 per 50 HPF. The Ki-67 mitotic rate was 20%, [id number]                               STAGE: [id number]  CONCURRENT SYSTEMIC THERAPY:   Mitotane       ONCOLOGIC HISTORY:          Ms. Rodríguez is a 33 year old female with a  [id number] adrenocortical carcinoma, s/p RT radical adrenalectomy with positive surgical margin.She underwent adjuvant radiotherapy to the right adrenal bed. Full oncologic history is outlined below.     She inially presented to emergency room on 2018 with acute onset left flank pain. Given her Hx of renal stones, she was evaluated by CT abdomen and pelvis on 18 showed 9.2 x 8 cm heterogeneous right upper quadrant mass with small foci of calcification, which is likely arising from the adrenal gland though inseparable from liver and upper pole of right kidney.There was a 6 x 4 x 4 mm upper third left ureteral obstructing stone with mild to moderate secondary hydronephrosis, collection of stones at lower pole left kidney extending over an area of approximately 6 x 7 x  4 mm, a non-obstructing 1 mm stone upper pole left kidney and a 2 mm nonobstructing stone noted upper pole right kidney with no hydronephrosis on the right.       She underwent left nephrolithiasis on 18 by Dr. Delvalle at [facility] urology clinic.Then she was referred to Dr. Patel for evaluation of her adrenal mass. On  further questioning, She had noticed, over the past 8-9 months, large amount of facial hair, irregular periods and some facial swelling.She denies any palpitations, headaches, or sweats.      Then she was referred Dr. Mansfield for possible surgical resection. The plan was to proceed with surgery given that the tumor mass was too larg to be observed.      She underwent right adrenalectomy on 6/25/18. The surgical pathology ([id number]) was low grade adrenal cortical carcinoma of a mass sized 11.3 cm. The tumor shows necrosis and invasion into the adrenal capsule. The margins were involved by the tumor.The tumor was less than 25% clear cells and a mitotic rate of 15 per 50 HPF. The Ki-67 mitotic rate was 20%. [id number]      For staging workup, she underwent MRI of the brain on 7/22/18 showed no evidence of brain metastases. Also, she had PET scan on 7/23/18 showed no suspicious metastatic lesions.        SITE OF TREATMENT:  Right adrenal bed     DATES  OF TREATMENT:  8/27/18 to 10/8/18     TOTAL DOSE OF TREATMENT:  5400 cGy in 30 fractions      SYSTEMIC THERAPY:  Concurrent mitotane     INTERVAL SINCE COMPLETION OF RADIATION THERAPY:   5 months    SUBJECTIVE:   The patient returns for follow up today. She is overall doing better now. She underwent CTCAP on 2/11/19 that did not demonstrate any evidence of recurrent disease in adrenal bed or distantly.  MRI brain on 2/11/19 was negative. She continues on systemic therapy under the care of Dr. Cintron. She was also seen by Dr. Huertas ([facility]) with recommendation of continuation of systemic therapy and routine surveillance imaging at this time.     She continues to have nausea, currently better controled on compazine and medical marijuana.  She has had some neurocognitive change which has been improved since decreasing dose of mitotane. Denies other GI bother.      PHYSICAL EXAM:  /70 (BP Location: Left arm, Cuff Size: Adult Large)   Pulse 96   Resp 16   Wt 72.7 kg (160  lb 3.2 oz)   BMI 27.50 kg/m     Gen: Alert, in NAD  Eyes: PERRL, EOMI, sclera anicteric  Neck: Supple, full ROM, no LAD  Pulm: No wheezing, stridor or respiratory distress  CV: Well-perfused, no cyanosis, no pedal edema  Abdominal: Soft, nontender, nondistended, no hepatomegaly  Back: No step-offs or pain to palpation along the thoracolumbar spine, no CVA tenderness  Musculoskeletal: Normal bulk and tone   Skin: Normal color and turgor  Neurologic: A/Ox3, CN II-XII intact  Psychiatric: Appropriate mood and affect    LABS AND IMAGIN19  CTCAP  IMPRESSION:   1. Evolving surgical changes of right adrenalectomy without convincing  evidence of local recurrence.  2. No evidence of metastatic disease in the chest, abdomen, or pelvis.  3. Minimal change in multicystic appearance of the left ovary,  containing intermediate density material, not well evaluated by CT.  While this appearance is suggestive of hemorrhagic cysts, given  persistence, further evaluation with pelvic ultrasound is recommended    19   MRI Brain  Impression:  1. No evidence of intracranial metastatic disease.  2. Air-fluid levels within the maxillary sinuses may represent acute  sinusitis in the proper clinical setting    IMPRESSION:   Ms. Rodríguez is a 33 year old female with a  [id number] adrenocortical carcinoma, s/p RT radical adrenalectomy with positive surgical margin.She underwent adjuvant radiotherapy to the right adrenal bed. She completed RT on 10/8/18.    PLAN:   1. Remains AFUA. No late RT toxicity.  2. Continue systemic therapy with Dr. Cintron.   3. RTC in 6 months. Defer future imaging to medical oncology.       Cesar Monsivais M.D.  Department of Radiation Oncology  [facility]

## 2019-02-28 NOTE — PROGRESS NOTES
Department of Radiation Oncology  Radiation Therapy Center  [facility] [place] [address]  [phone]       Radiation Oncology Follow-up Visit  2019      Suzy Rodríguez  MRN: [id number]   : 1985     DIAGNOSIS: Low grade adrenal cortical carcinoma, right adrenal gland  INTENT OF RADIOTHERAPY: Curative  PATHOLOGY:    Low grade adrenal cortical carcinoma,  11.3 cm, necrosis and invasion into the adrenal capsule, + margins, tumot was less than 25% clear cells and a mitotic rate of 15 per 50 HPF. The Ki-67 mitotic rate was 20%, [id number]                               STAGE: [id number]  CONCURRENT SYSTEMIC THERAPY:   Mitotane       ONCOLOGIC HISTORY:          Ms. Rodríguez is a 33 year old female with a  [id number] adrenocortical carcinoma, s/p RT radical adrenalectomy with positive surgical margin.She underwent adjuvant radiotherapy to the right adrenal bed. Full oncologic history is outlined below.     She inially presented to emergency room on 2018 with acute onset left flank pain. Given her Hx of renal stones, she was evaluated by CT abdomen and pelvis on 18 showed 9.2 x 8 cm heterogeneous right upper quadrant mass with small foci of calcification, which is likely arising from the adrenal gland though inseparable from liver and upper pole of right kidney.There was a 6 x 4 x 4 mm upper third left ureteral obstructing stone with mild to moderate secondary hydronephrosis, collection of stones at lower pole left kidney extending over an area of approximately 6 x 7 x  4 mm, a non-obstructing 1 mm stone upper pole left kidney and a 2 mm nonobstructing stone noted upper pole right kidney with no hydronephrosis on the right.       She underwent left nephrolithiasis on 18 by Dr. Delvalle at [facility] urology clinic.Then she was referred to Dr. Patel for evaluation of her adrenal mass. On further questioning, She had noticed, over the past 8-9 months, large amount of  facial hair, irregular periods and some facial swelling.She denies any palpitations, headaches, or sweats.      Then she was referred Dr. Mansfield for possible surgical resection. The plan was to proceed with surgery given that the tumor mass was too larg to be observed.      She underwent right adrenalectomy on 6/25/18. The surgical pathology ([id number]) was low grade adrenal cortical carcinoma of a mass sized 11.3 cm. The tumor shows necrosis and invasion into the adrenal capsule. The margins were involved by the tumor.The tumor was less than 25% clear cells and a mitotic rate of 15 per 50 HPF. The Ki-67 mitotic rate was 20%. [id number]      For staging workup, she underwent MRI of the brain on 7/22/18 showed no evidence of brain metastases. Also, she had PET scan on 7/23/18 showed no suspicious metastatic lesions.        SITE OF TREATMENT:  Right adrenal bed     DATES  OF TREATMENT:  8/27/18 to 10/8/18     TOTAL DOSE OF TREATMENT:  5400 cGy in 30 fractions      SYSTEMIC THERAPY:  Concurrent mitotane     INTERVAL SINCE COMPLETION OF RADIATION THERAPY:   5 months    SUBJECTIVE:   The patient returns for follow up today. She is overall doing better now. She underwent CTCAP on 2/11/19 that did not demonstrate any evidence of recurrent disease in adrenal bed or distantly.  MRI brain on 2/11/19 was negative. She continues on systemic therapy under the care of Dr. Cintron. She was also seen by Dr. Huertas ([facility]) with recommendation of continuation of systemic therapy and routine surveillance imaging at this time.     She continues to have nausea, currently better controled on compazine and medical marijuana.  She has had some neurocognitive change which has been improved since decreasing dose of mitotane. Denies other GI bother.      PHYSICAL EXAM:  /70 (BP Location: Left arm, Cuff Size: Adult Large)   Pulse 96   Resp 16   Wt 72.7 kg (160 lb 3.2 oz)   BMI 27.50 kg/m    Gen: Alert, in NAD  Eyes: PERRL, EOMI, sclera  anicteric  Neck: Supple, full ROM, no LAD  Pulm: No wheezing, stridor or respiratory distress  CV: Well-perfused, no cyanosis, no pedal edema  Abdominal: Soft, nontender, nondistended, no hepatomegaly  Back: No step-offs or pain to palpation along the thoracolumbar spine, no CVA tenderness  Musculoskeletal: Normal bulk and tone   Skin: Normal color and turgor  Neurologic: A/Ox3, CN II-XII intact  Psychiatric: Appropriate mood and affect    LABS AND IMAGIN19  CTCAP  IMPRESSION:   1. Evolving surgical changes of right adrenalectomy without convincing  evidence of local recurrence.  2. No evidence of metastatic disease in the chest, abdomen, or pelvis.  3. Minimal change in multicystic appearance of the left ovary,  containing intermediate density material, not well evaluated by CT.  While this appearance is suggestive of hemorrhagic cysts, given  persistence, further evaluation with pelvic ultrasound is recommended    19   MRI Brain  Impression:  1. No evidence of intracranial metastatic disease.  2. Air-fluid levels within the maxillary sinuses may represent acute  sinusitis in the proper clinical setting    IMPRESSION:   Ms. Rodríguez is a 33 year old female with a  [id number] adrenocortical carcinoma, s/p RT radical adrenalectomy with positive surgical margin.She underwent adjuvant radiotherapy to the right adrenal bed. She completed RT on 10/8/18.    PLAN:   1. Remains AFUA. No late RT toxicity.  2. Continue systemic therapy with Dr. Cintron.   3. RTC in 6 months. Defer future imaging to medical oncology.       Cesar Monsivais M.D.  Department of Radiation Oncology  [facility]

## 2019-02-28 NOTE — ORAL ONC MGMT
Oral Chemotherapy Monitoring Program  Phone Assessment     Patient currently on mitotane 1000mg TID.   Reviewed 2/26 provider visit today, from [facility] and attempted phone assessment outreach.      Assessment & Plan:  [place] follow-up in 6 months.   Will see Abdulaziz in 2 weeks.   Visits today with Alis.   Rescheduled call in 1 week. Primary issues with mitotane: nausea.     Follow-Up:  3/5 - phone assessment  3/12 - R Cintron visit     Xiomara Vigil, PharmD, MPH, BCOP  Hematology/Oncology Clinical Pharmacist

## 2019-03-05 ENCOUNTER — TELEPHONE (OUTPATIENT)
Dept: ONCOLOGY | Facility: CLINIC | Age: 34
End: 2019-03-05

## 2019-03-05 NOTE — ORAL ONC MGMT
Oral Chemotherapy Monitoring Program     Placed call to patient in follow up of Mitotane therapy.     Left message to please call back in follow-up of therapy. No patient or drug names were mentioned.     Fannie Stringer, Pharm.D., [facility]  [phone]  03/05/19

## 2019-03-12 ENCOUNTER — APPOINTMENT (OUTPATIENT)
Dept: LAB | Facility: CLINIC | Age: 34
End: 2019-03-12
Attending: INTERNAL MEDICINE
Payer: COMMERCIAL

## 2019-03-12 ENCOUNTER — ONCOLOGY VISIT (OUTPATIENT)
Dept: ONCOLOGY | Facility: CLINIC | Age: 34
End: 2019-03-12
Attending: INTERNAL MEDICINE
Payer: COMMERCIAL

## 2019-03-12 ENCOUNTER — RECORDS - HEALTHEAST (OUTPATIENT)
Dept: ADMINISTRATIVE | Facility: OTHER | Age: 34
End: 2019-03-12

## 2019-03-12 VITALS
OXYGEN SATURATION: 96 % | DIASTOLIC BLOOD PRESSURE: 75 MMHG | SYSTOLIC BLOOD PRESSURE: 125 MMHG | HEIGHT: 64 IN | HEART RATE: 86 BPM | TEMPERATURE: 98.1 F | WEIGHT: 159.2 LBS | BODY MASS INDEX: 27.18 KG/M2

## 2019-03-12 DIAGNOSIS — F19.982 DRUG-INDUCED INSOMNIA (H): ICD-10-CM

## 2019-03-12 DIAGNOSIS — T88.7XXA MEDICATION SIDE EFFECTS: ICD-10-CM

## 2019-03-12 DIAGNOSIS — C74.01 MALIGNANT NEOPLASM OF CORTEX OF RIGHT ADRENAL GLAND (H): Primary | ICD-10-CM

## 2019-03-12 LAB
ALBUMIN SERPL-MCNC: 3 G/DL (ref 3.4–5)
ALP SERPL-CCNC: 211 U/L (ref 40–150)
ALT SERPL W P-5'-P-CCNC: 36 U/L (ref 0–50)
ANION GAP SERPL CALCULATED.3IONS-SCNC: 6 MMOL/L (ref 3–14)
AST SERPL W P-5'-P-CCNC: 32 U/L (ref 0–45)
BASOPHILS # BLD AUTO: 0 10E9/L (ref 0–0.2)
BASOPHILS NFR BLD AUTO: 0.5 %
BILIRUB SERPL-MCNC: 0.1 MG/DL (ref 0.2–1.3)
BUN SERPL-MCNC: 10 MG/DL (ref 7–30)
CALCIUM SERPL-MCNC: 8.4 MG/DL (ref 8.5–10.1)
CHLORIDE SERPL-SCNC: 103 MMOL/L (ref 94–109)
CO2 SERPL-SCNC: 27 MMOL/L (ref 20–32)
CREAT SERPL-MCNC: 0.64 MG/DL (ref 0.52–1.04)
DIFFERENTIAL METHOD BLD: ABNORMAL
EOSINOPHIL # BLD AUTO: 0 10E9/L (ref 0–0.7)
EOSINOPHIL NFR BLD AUTO: 0.7 %
ERYTHROCYTE [DISTWIDTH] IN BLOOD BY AUTOMATED COUNT: 17 % (ref 10–15)
GFR SERPL CREATININE-BSD FRML MDRD: >90 ML/MIN/{1.73_M2}
GLUCOSE SERPL-MCNC: 86 MG/DL (ref 70–99)
HCT VFR BLD AUTO: 36.8 % (ref 35–47)
HGB BLD-MCNC: 10.8 G/DL (ref 11.7–15.7)
IMM GRANULOCYTES # BLD: 0 10E9/L (ref 0–0.4)
IMM GRANULOCYTES NFR BLD: 0.3 %
LYMPHOCYTES # BLD AUTO: 0.4 10E9/L (ref 0.8–5.3)
LYMPHOCYTES NFR BLD AUTO: 7.3 %
MCH RBC QN AUTO: 22.5 PG (ref 26.5–33)
MCHC RBC AUTO-ENTMCNC: 29.3 G/DL (ref 31.5–36.5)
MCV RBC AUTO: 77 FL (ref 78–100)
MISCELLANEOUS TEST: NORMAL
MONOCYTES # BLD AUTO: 0.4 10E9/L (ref 0–1.3)
MONOCYTES NFR BLD AUTO: 5.8 %
NEUTROPHILS # BLD AUTO: 5.2 10E9/L (ref 1.6–8.3)
NEUTROPHILS NFR BLD AUTO: 85.4 %
NRBC # BLD AUTO: 0 10*3/UL
NRBC BLD AUTO-RTO: 0 /100
PLATELET # BLD AUTO: 277 10E9/L (ref 150–450)
POTASSIUM SERPL-SCNC: 4.4 MMOL/L (ref 3.4–5.3)
PROT SERPL-MCNC: 6 G/DL (ref 6.8–8.8)
RBC # BLD AUTO: 4.79 10E12/L (ref 3.8–5.2)
SODIUM SERPL-SCNC: 136 MMOL/L (ref 133–144)
WBC # BLD AUTO: 6 10E9/L (ref 4–11)

## 2019-03-12 PROCEDURE — 85025 COMPLETE CBC W/AUTO DIFF WBC: CPT | Performed by: INTERNAL MEDICINE

## 2019-03-12 PROCEDURE — 80375 DRUG/SUBSTANCE NOS 1-3: CPT | Performed by: INTERNAL MEDICINE

## 2019-03-12 PROCEDURE — 36415 COLL VENOUS BLD VENIPUNCTURE: CPT

## 2019-03-12 PROCEDURE — 82627 DEHYDROEPIANDROSTERONE: CPT | Performed by: INTERNAL MEDICINE

## 2019-03-12 PROCEDURE — G0463 HOSPITAL OUTPT CLINIC VISIT: HCPCS | Mod: ZF

## 2019-03-12 PROCEDURE — 99215 OFFICE O/P EST HI 40 MIN: CPT | Mod: ZP | Performed by: INTERNAL MEDICINE

## 2019-03-12 PROCEDURE — 93010 ELECTROCARDIOGRAM REPORT: CPT | Performed by: INTERNAL MEDICINE

## 2019-03-12 PROCEDURE — 84999 UNLISTED CHEMISTRY PROCEDURE: CPT | Performed by: INTERNAL MEDICINE

## 2019-03-12 PROCEDURE — 80053 COMPREHEN METABOLIC PANEL: CPT | Performed by: INTERNAL MEDICINE

## 2019-03-12 RX ORDER — ALBUTEROL SULFATE 0.83 MG/ML
2.5 SOLUTION RESPIRATORY (INHALATION)
Status: CANCELLED | OUTPATIENT
Start: 2019-03-14

## 2019-03-12 RX ORDER — EPINEPHRINE 1 MG/ML
0.3 INJECTION, SOLUTION INTRAMUSCULAR; SUBCUTANEOUS EVERY 5 MIN PRN
Status: CANCELLED | OUTPATIENT
Start: 2019-03-14

## 2019-03-12 RX ORDER — EPINEPHRINE 0.3 MG/.3ML
0.3 INJECTION SUBCUTANEOUS EVERY 5 MIN PRN
Status: CANCELLED | OUTPATIENT
Start: 2019-03-14

## 2019-03-12 RX ORDER — TRAZODONE HYDROCHLORIDE 50 MG/1
25-50 TABLET, FILM COATED ORAL
Qty: 30 TABLET | Refills: 0 | Status: SHIPPED | OUTPATIENT
Start: 2019-03-12 | End: 2019-03-27

## 2019-03-12 RX ORDER — METHYLPREDNISOLONE SODIUM SUCCINATE 125 MG/2ML
125 INJECTION, POWDER, LYOPHILIZED, FOR SOLUTION INTRAMUSCULAR; INTRAVENOUS
Status: CANCELLED
Start: 2019-03-14

## 2019-03-12 RX ORDER — DIPHENHYDRAMINE HYDROCHLORIDE 50 MG/ML
50 INJECTION INTRAMUSCULAR; INTRAVENOUS
Status: CANCELLED
Start: 2019-03-14

## 2019-03-12 RX ORDER — MEPERIDINE HYDROCHLORIDE 25 MG/ML
25 INJECTION INTRAMUSCULAR; INTRAVENOUS; SUBCUTANEOUS EVERY 30 MIN PRN
Status: CANCELLED | OUTPATIENT
Start: 2019-03-14

## 2019-03-12 RX ORDER — SODIUM CHLORIDE 9 MG/ML
1000 INJECTION, SOLUTION INTRAVENOUS CONTINUOUS PRN
Status: CANCELLED
Start: 2019-03-14

## 2019-03-12 RX ORDER — ALBUTEROL SULFATE 90 UG/1
1-2 AEROSOL, METERED RESPIRATORY (INHALATION)
Status: CANCELLED
Start: 2019-03-14

## 2019-03-12 ASSESSMENT — PAIN SCALES - GENERAL: PAINLEVEL: NO PAIN (0)

## 2019-03-12 ASSESSMENT — MIFFLIN-ST. JEOR: SCORE: 1407.13

## 2019-03-12 NOTE — NURSING NOTE
Chief Complaint   Patient presents with     Lab Only     labs drawn via  by RN in lab, vitals completed

## 2019-03-12 NOTE — PROGRESS NOTES
MEDICAL ONCOLOGY CLINIC NOTE    REFERRING PROVIDER: Warren Mansfield MD from [facility] Urologic Oncology clinic.     REASON FOR CURRENT VISIT: Follow-up while on mitotane as adjuvant therapy of adrenocortical carcinoma.      HISTORY OF PRESENT ILLNESS: Ms. 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 Her oncologic history is detailed as under.     Suzy is feeling much better overall since being on mitotane 1000mg TID. Also on hydrocortisone 20mg QAM, 10mg early PM, 10mcg late PM after having found to have elevated urine free cortisol on visit with Dr. Hilton at [facility] on 2/26/19. Dr. Hilton did not recommend any significant changes to our management plan. Nausea is better but still requires Compazine and using medical marijuana. Had one acute episode last Thursday - was feeling gradually tired through the day and then laid down on couch around 7 PM. She felt dizzy and confused. Unclear if she fell asleep or had an episode of LOC but per information from 10-year old son, who witness the entire event and appears to recall details accurately, she had no features of focal neuro deficits, abnormal body movements etc.  gave her 20mcg hydrocortisone and some grapes and symptoms resolved rapidly. No recurrence. No history of CVA/TIA/palpitations/CP/SOA etc.     She follows with Dr. Veena Jaquez in our endocrinology department and has felt confident with her care. Has also met with Ector Marcelino, our psychologist, and is feeling better overall, but does describe symptoms of intermittent anxiety.     No clinical evidence of disease recurrence.    ONCOLOGIC HISTORY:   1. 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   - CT  "abdomen and pelvis stone protocol without contrast [id number] [id number] showed \"9.2 x 8 cm heterogeneous right upper quadrant mass with small foci of calcification within it which is likely arising from the adrenal gland though inseparable from liver and upper pole of right kidney. It is incompletely evaluated on this noncontrast study. 3 x 2.6 cm mass right lobe of liver on image #85 also incompletely evaluated. 6 x 4 x 4 mm upper third left ureteral obstructing stone with mild to moderate secondary hydronephrosis. Collection of stones at lower pole left kidney extending over an area of approximately 6 x 7 x 4 mm. Additional nonobstructing 1 mm stone upper pole left kidney. 2 mm nonobstructing stone noted upper pole right kidney with no hydronephrosis on the right. Postop change consistent with gastric bypass. Noncontrast images of spleen, left adrenal gland, gallbladder, and pancreas unremarkable. No aneurysm. No adenopathy.\"  - Seen by Dr. Delvalle at [facility] Urology clinic. Referred to Dr. Alvino Patel and then Dr. Warren Mansfield.  - Endocrinology team directed workup showed high DHEAS level of 740 pre-surgery.   - Right open adrenalectomy and hepatic mobilization performed by Dr. Warren Mansfield on 6/25/2018. Pathology showed adrenocortical carcinoma measuring 11.3 cm, weighing 413 g with extension into or through the adrenal capsule negative lymphovascular invasion, tumor necrosis present, margins involved by tumor, no regional lymph nodes identified, pathologic stage T2 NX.  - DHEAS normalized postsurgery.   - Adjuvant Mitotane started on 7/24/18. Dose increased to 2000mg TID around 10/23/18 due to persistently low troughs. Held 1/16/19 for two weeks and resumed 1/30 at 1000 mg po BID due to very poor tolerance of higher doses. Highest mitotane level was 10.2 on 2/11/19. Now taking 1000mg TID.  - Saw radiation oncology at [facility], radiation oncology at [facility], as well as " endocrine oncology (Dr. Huertas) at [facility].   - S/p adjuvant RT by Dr. Monsivais - 5040 cGy over 30 fr (8/24/18-10/6/18).  - 2/11/19: CT C/A/P and MRI brain with contrast - no evidence of disease recurrence.    REVIEW OF SYSTEMS: 14 point ROS negative other than the symptoms noted above in the HPI.    PAST MEDICAL AND SURGICAL HISTORY: Reviewed today  1. Right-sided adrenocortical carcinoma.  2. MHTFR mutation with h.o mutiple miscarriages.  3. Ovarian cysts diagnosed in 2011.  4. H/o gastric bypass in 2016 for obesity.    SOCIAL HISTORY:   Social History     Tobacco Use     Smoking status: Never Smoker     Smokeless tobacco: Never Used   Substance Use Topics     Alcohol use: Yes     Comment: occ.     Drug use: No     FAMILY HISTORY:   Reviewed and non-contributory to current admission.      ALLERGIES:   Allergies   Allergen Reactions     Bee Venom Swelling     Ibuprofen Hives and Swelling     CURRENT MEDICATIONS:   Current Outpatient Medications:      acetaminophen (TYLENOL) 325 MG tablet, Take 2 tablets (650 mg) by mouth every 4 hours as needed for other (multimodal surgical pain management along with NSAIDS and opioid medication as indicated based on pain control and physical function.), Disp: 150 tablet, Rfl: 0     buPROPion (WELLBUTRIN) 100 MG tablet, TAKE 1 TABLET (100 MG TOTAL) BY MOUTH 2 (TWO) TIMES A DAY., Disp: , Rfl: 3     calcium carbonate antacid 1000 MG CHEW, Take 1 tablet by mouth every morning , Disp: , Rfl:      cholecalciferol (VITAMIN D-1000 MAX ST) 1000 units TABS, Take 2,000 Units by mouth every morning , Disp: , Rfl:      DRONABINOL PO, by Other route every other day Vape, Disp: , Rfl:      ferrous sulfate (IRON) 325 (65 Fe) MG tablet, Take 325 mg by mouth daily, Disp: , Rfl:      fludrocortisone (FLORINEF) 0.1 MG tablet, Take 0.5 tablets (0.05 mg) by mouth daily, Disp: 45 tablet, Rfl: 3     FLUoxetine (PROZAC) 40 MG capsule, Take 40 mg by mouth daily, Disp: , Rfl:      gabapentin  (NEURONTIN) 300 MG capsule, Take 300 mg by mouth 3 times daily, Disp: , Rfl:      hydrocortisone (CORTEF) 10 MG tablet, Take 3 pills (=30 mg) in AM and 2 pills (=20 mg) at 2 pm daily, additional as directed., Disp: 180 tablet, Rfl: 11     levothyroxine (SYNTHROID/LEVOTHROID) 125 MCG tablet, Take 1 tablet (125 mcg) by mouth daily, Disp: 90 tablet, Rfl: 3     mitotane (LYSODREN) 500 MG tablet CHEMO, Take 3 tablets (1,500 mg) by mouth 4 times daily (Patient taking differently: Take 1,000 mg by mouth 3 times daily ), Disp: 360 tablet, Rfl: 0     Multiple Vitamins-Calcium (ONE-A-DAY WOMENS PO), Take 2 tablets by mouth every morning , Disp: , Rfl:      prochlorperazine (COMPAZINE) 10 MG tablet, Take 1 tablet (10 mg) by mouth every 6 hours as needed for nausea or vomiting, Disp: 90 tablet, Rfl: 1     traZODone (DESYREL) 50 MG tablet, Take 0.5-1 tablets (25-50 mg) by mouth nightly as needed for sleep, Disp: 30 tablet, Rfl: 0     diazepam (VALIUM) 5 MG tablet, Take 1 tablet (5 mg) by mouth once as needed for anxiety (MRI claustrophobia management) (Patient not taking: Reported on 3/12/2019), Disp: 2 tablet, Rfl: 3     EPINEPHrine (EPIPEN/ADRENACLICK/OR ANY BX GENERIC EQUIV) 0.3 MG/0.3ML injection 2-pack, As directed for bee stings, Disp: , Rfl:      LORazepam (ATIVAN) 1 MG tablet, Take 1 tablet (1 mg) by mouth every 6 hours as needed for nausea (Patient not taking: Reported on 3/12/2019), Disp: 60 tablet, Rfl: 1     melatonin 3 MG CAPS, Take 1 capsule by mouth At Bedtime (Patient not taking: Reported on 3/12/2019), Disp: 30 capsule, Rfl: 11     pantoprazole (PROTONIX) 40 MG EC tablet, Take 1 tablet (40 mg) by mouth daily (Patient not taking: Reported on 3/12/2019), Disp: 30 tablet, Rfl: 0     triamcinolone (KENALOG) 0.1 % lotion, Apply topically 3 times daily To rash on chest and shoudler (Patient not taking: Reported on 3/12/2019), Disp: 60 mL, Rfl: 1    PHYSICAL EXAMINATION:  Vital signs: /75   Pulse 86   Temp 98.1  " F (36.7  C) (Oral)   Ht 1.626 m (5' 4\")   Wt 72.2 kg (159 lb 3.2 oz)   LMP 03/04/2019 (Approximate)   SpO2 96%   Breastfeeding? No   BMI 27.33 kg/m     ECOG performance status of 1.   GENERAL: Young lady in chair, in NAD  HEENT: No icterus, no pallor. MMM, PERRL, OP clear.   NECK: Supple, no JVD/LAD.  LUNGS: CTAB, normal WOB on RA   CARDIOVASCULAR: Regular rate and rhythm, no murmurs, gallops or rubs, normal S1/S2.   ABDOMEN: Well-healing abdominal incision without fluctuance or exudate. Soft, NT, ND, no masses appreciated, normal BS.   EXTREMITIES: No cyanosis, no clubbing, no edema.   NEUROLOGIC: No focal deficits, CN 2-12 intact. Linear thought process.    LYMPH NODE EXAM: No palpable adenopathy - cervical, supraclavicular.      LABORATORY DATA:   Lab Test 03/12/19  1341 02/12/19  0949 01/09/19  1018 01/08/19  1617   WBC 6.0  --  4.4 9.2   RBC 4.79  --  4.68 4.19   HGB 10.8*  --  9.7* 9.2*   HCT 36.8  --  34.9* 31.4*   MCV 77*  --  75* 75*   MCH 22.5*  --  20.7* 22.0*   MCHC 29.3*  --  27.8* 29.3*   RDW 17.0*  --  18.1* 17.6*     --  229 236   NEUTROPHIL 85.4  --  86.1 89.9    138 136 138   POTASSIUM 4.4 3.6 3.5 4.2   CHLORIDE 103 104 102 106   CO2 27 28 27 25   ANIONGAP 6 6 7 7   GLC 86 81 84 88   BUN 10 7 7 10   CR 0.64 0.70 0.60 0.59   SHASHA 8.4* 7.7* 8.1* 7.3*   PROTTOTAL 6.0*  --  6.2* 5.7*   ALBUMIN 3.0* 3.3* 3.0* 3.0*   BILITOTAL 0.1*  --  0.3 0.3   ALKPHOS 211*  --  182* 161*   AST 32  --  57* 51*   ALT 36  --  55* 49     DHEAS was 740 on 6/5/18, and has been <15 on 7/12/18, 8/20/18, 9/19/18, 10/23/18, 11/27/18, 1/9/19.  Mitotane level (target 14-20): 1.8ng/dl on 8/20/18, 3.5 on 10/25/18, 6.2 on 12/4/18, 8.1 on 1/9/19 and 10.2 on 2/11/19.    IMAGING STUDIES:  As above.    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     Adrenocortical ca:  - Started on adjuvant mitotane in July 2018 based on " "her presentation and tumor characteristics including invasion of the adrenal capsule, high mitotic rate, possibly positive margins, and high Ki67, which could result in a rate of recurrence as high as 40%.    - Restaging CT results from 2/11/19 show no evidence of disease recurrence. No evidence of clinical or biochemical recurrence either.   - Mitotane level ([facility] lab #[id number]) continues to be subtherapeutic ~10 months since starting therapy as the goal Mitotane levels are 14-20.   - Despite multiple supportive care interventions, patient has NOT been able to tolerate higher doses of mitotane. Even at the current dose level (1000mg TID), she has moderate-severe nausea and these episodes of possible side effects.  - If non-specific acute events recur, will consider Neurology referral. Advised to go to ER if this happens before f/up visit.  - We've had numerous conversations regarding goals of therapy and that mitotane doesn't work on an \"all or none\" basis with no activity below therapeutic level. In her case, it is critical to not aim for a number but to actually aim for optimizing tolerance to therapy to allow continuation for another 4.5 years. Suzy and her  are in agreement.   - Will continue mitotane to 1000mg TID for now. No recheck of levels in the interim as it will not affect management.  - Monthly Mitotane level, CBC, comprehensive panel, TSH, FT4, cholesterol panel, 24-hour Urine Free Cortisol, and DHEAS.   - Plan to continue adjuvant Mitotane for up to 5 yrs if tolerated.  - Nausea mgmt as under.  - Next restaging CT ordered in 2 months.    Multifactorial nausea:  - Pt on mitotatne and PO iron.   - Will stop PO iron effective immediately.  - While there are plenty of medical interventions that one could undertake to control nausea including olanzapine, patient has recently fallen and had this unexplained episode of dizziness and confusion, possibly due to polypharmacy.   - Will also stop medical " marijuana use for 1 month.   - Advised to use Compazine sparingly.     Concern for ativan use disorder:  - Suzy has been dealing with tremendous stress over the past few months and was using Ativan HS almost daily for anxiety and nausea.   - I counseled her of the addictive potential of this drug and she did agree that she has felt increasingly desiring this medication.   - We have stopped Ativan and I would encourage other medical providers to not give any further refills.  - Discussed health coping strategies including cognitive behavioral interventions. Seen by our psychology team as well.     Insomnia:  - Given a small dose of trazodone after clearly informing her of side effects including insomnia, falls etc.  - EKG with QTc 464 ms today.    Endocrinopathies:  - Mgmt per endocrine team at the .     Iron deficiency anemia:  - MCV and iron panel suggests iron deficiency, she has heavy periods and no other obvious sources of blood loss.  - Stop PO iron as she's got IUD placed in 2019.     Bilateral ovarian cysts:  - Noted on CT scan and stable for past 6 months. Pt reports a long-standing history of this issue.  - Patient wants to contact PCP for further mgmt including an ultrasound and Gyn referral.    Return to see me in 1 month with labs. Patient and family given opportunity to ask questions, which were answered to their satisfaction. They're in complete agreement as planned.    BILLIN.    Benson Cintron M.D.  . Professor of Medicine  Genitourinary Oncology  Division of Hematology, Oncology & Transplantation  [facility]    CC: Edwin Huertas MD   [facility]

## 2019-03-12 NOTE — NURSING NOTE
"Oncology Rooming Note    March 12, 2019 1:47 PM   Suzy Rodríguez is a 33 year old female who presents for:    Chief Complaint   Patient presents with     Lab Only     labs drawn via  by RN in lab, vitals completed     Oncology Clinic Visit     Return Adrenal Cortical Ca     Initial Vitals: /75   Pulse 86   Temp 98.1  F (36.7  C) (Oral)   Ht 1.626 m (5' 4\")   Wt 72.2 kg (159 lb 3.2 oz)   LMP 03/04/2019 (Approximate)   SpO2 96%   Breastfeeding? No   BMI 27.33 kg/m   Estimated body mass index is 27.33 kg/m  as calculated from the following:    Height as of this encounter: 1.626 m (5' 4\").    Weight as of this encounter: 72.2 kg (159 lb 3.2 oz). Body surface area is 1.81 meters squared.  No Pain (0) Comment: Data Unavailable   Patient's last menstrual period was 03/04/2019 (approximate).  Allergies reviewed: Yes  Medications reviewed: Yes    Medications: Medication refills not needed today.  Pharmacy name entered into [facility]:    [id number] - [place], [place] - [address] [place] - [address] [facility] [address] [place] [address] PHARMACY [place], [place] - [address] [facility] - [place], [place] - [address] [address]  [facility] [address] [place] [facility] [place], [place] - [address]    Clinical concerns: No new concerns.         Sulma Altamirano CMA              "

## 2019-03-12 NOTE — LETTER
3/12/2019       RE: Suzy Rodríguez  [address]  [place] [address]     Dear Colleague,    Thank you for referring your patient, Suzy Rodríguez, to the [facility]. Please see a copy of my visit note below.    MEDICAL ONCOLOGY CLINIC NOTE    REFERRING PROVIDER: Warren Mansfield MD from [facility] clinic.     REASON FOR CURRENT VISIT: Follow-up while on mitotane as adjuvant therapy of adrenocortical carcinoma.      HISTORY OF PRESENT ILLNESS: Ms. 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 Her oncologic history is detailed as under.     Suzy is feeling much better overall since being on mitotane 1000mg TID. Also on hydrocortisone 20mg QAM, 10mg early PM, 10mcg late PM after having found to have elevated urine free cortisol on visit with Dr. Hilton at [facility] on 2/26/19. Dr. Hilton did not recommend any significant changes to our management plan. Nausea is better but still requires Compazine and using medical marijuana. Had one acute episode last Thursday - was feeling gradually tired through the day and then laid down on couch around 7 PM. She felt dizzy and confused. Unclear if she fell asleep or had an episode of LOC but per information from 10-year old son, who witness the entire event and appears to recall details accurately, she had no features of focal neuro deficits, abnormal body movements etc.  gave her 20mcg hydrocortisone and some grapes and symptoms resolved rapidly. No recurrence. No history of CVA/TIA/palpitations/CP/SOA etc.     She follows with Dr. Veena Jaquez in our endocrinology department and has felt confident with her care. Has also met with Ector Marcelino, our psychologist, and is feeling better overall, but does describe symptoms of intermittent anxiety.     No clinical evidence of disease  "recurrence.    ONCOLOGIC HISTORY:   1. 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   - CT abdomen and pelvis stone protocol without contrast 5 4415 showed \"9.2 x 8 cm heterogeneous right upper quadrant mass with small foci of calcification within it which is likely arising from the adrenal gland though inseparable from liver and upper pole of right kidney. It is incompletely evaluated on this noncontrast study. 3 x 2.6 cm mass right lobe of liver on image #85 also incompletely evaluated. 6 x 4 x 4 mm upper third left ureteral obstructing stone with mild to moderate secondary hydronephrosis. Collection of stones at lower pole left kidney extending over an area of approximately 6 x 7 x 4 mm. Additional nonobstructing 1 mm stone upper pole left kidney. 2 mm nonobstructing stone noted upper pole right kidney with no hydronephrosis on the right. Postop change consistent with gastric bypass. Noncontrast images of spleen, left adrenal gland, gallbladder, and pancreas unremarkable. No aneurysm. No adenopathy.\"  - Seen by Dr. Delvalle at [facility] clinic. Referred to Dr. Alvino Patel and then Dr. Warren Mansfield.  - Endocrinology team directed workup showed high DHEAS level of 740 pre-surgery.   - Right open adrenalectomy and hepatic mobilization performed by Dr. Warren Mansfield on 6/25/2018. Pathology showed adrenocortical carcinoma measuring 11.3 cm, weighing 413 g with extension into or through the adrenal capsule negative lymphovascular invasion, tumor necrosis present, margins involved by tumor, no regional lymph nodes identified, pathologic stage T2 NX.  - DHEAS normalized postsurgery.   - Adjuvant Mitotane started on 7/24/18. Dose increased to 2000mg TID around 10/23/18 due to persistently low troughs. Held 1/16/19 for two weeks and resumed " 1/30 at 1000 mg po BID due to very poor tolerance of higher doses. Highest mitotane level was 10.2 on 2/11/19. Now taking 1000mg TID.  - Saw radiation oncology at [facility], radiation oncology at [facility], as well as endocrine oncology (Dr. Huertas) at [facility].   - S/p adjuvant RT by Dr. Monsivais - 5040 cGy over 30 fr (8/24/18-10/6/18).  - 2/11/19: CT C/A/P and MRI brain with contrast - no evidence of disease recurrence.    REVIEW OF SYSTEMS: 14 point ROS negative other than the symptoms noted above in the HPI.    PAST MEDICAL AND SURGICAL HISTORY: Reviewed today  1. Right-sided adrenocortical carcinoma.  2. MHTFR mutation with h.o mutiple miscarriages.  3. Ovarian cysts diagnosed in 2011.  4. H/o gastric bypass in 2016 for obesity.    SOCIAL HISTORY:   Social History     Tobacco Use     Smoking status: Never Smoker     Smokeless tobacco: Never Used   Substance Use Topics     Alcohol use: Yes     Comment: occ.     Drug use: No     FAMILY HISTORY:   Reviewed and non-contributory to current admission.      ALLERGIES:   Allergies   Allergen Reactions     Bee Venom Swelling     Ibuprofen Hives and Swelling     CURRENT MEDICATIONS:   Current Outpatient Medications:      acetaminophen (TYLENOL) 325 MG tablet, Take 2 tablets (650 mg) by mouth every 4 hours as needed for other (multimodal surgical pain management along with NSAIDS and opioid medication as indicated based on pain control and physical function.), Disp: 150 tablet, Rfl: 0     buPROPion (WELLBUTRIN) 100 MG tablet, TAKE 1 TABLET (100 MG TOTAL) BY MOUTH 2 (TWO) TIMES A DAY., Disp: , Rfl: 3     calcium carbonate antacid 1000 MG CHEW, Take 1 tablet by mouth every morning , Disp: , Rfl:      cholecalciferol (VITAMIN D-1000 MAX ST) 1000 units TABS, Take 2,000 Units by mouth every morning , Disp: , Rfl:      DRONABINOL PO, by Other route every other day Vape, Disp: , Rfl:      ferrous sulfate (IRON) 325 (65 Fe) MG tablet, Take 325  mg by mouth daily, Disp: , Rfl:      fludrocortisone (FLORINEF) 0.1 MG tablet, Take 0.5 tablets (0.05 mg) by mouth daily, Disp: 45 tablet, Rfl: 3     FLUoxetine (PROZAC) 40 MG capsule, Take 40 mg by mouth daily, Disp: , Rfl:      gabapentin (NEURONTIN) 300 MG capsule, Take 300 mg by mouth 3 times daily, Disp: , Rfl:      hydrocortisone (CORTEF) 10 MG tablet, Take 3 pills (=30 mg) in AM and 2 pills (=20 mg) at 2 pm daily, additional as directed., Disp: 180 tablet, Rfl: 11     levothyroxine (SYNTHROID/LEVOTHROID) 125 MCG tablet, Take 1 tablet (125 mcg) by mouth daily, Disp: 90 tablet, Rfl: 3     mitotane (LYSODREN) 500 MG tablet CHEMO, Take 3 tablets (1,500 mg) by mouth 4 times daily (Patient taking differently: Take 1,000 mg by mouth 3 times daily ), Disp: 360 tablet, Rfl: 0     Multiple Vitamins-Calcium (ONE-A-DAY WOMENS PO), Take 2 tablets by mouth every morning , Disp: , Rfl:      prochlorperazine (COMPAZINE) 10 MG tablet, Take 1 tablet (10 mg) by mouth every 6 hours as needed for nausea or vomiting, Disp: 90 tablet, Rfl: 1     traZODone (DESYREL) 50 MG tablet, Take 0.5-1 tablets (25-50 mg) by mouth nightly as needed for sleep, Disp: 30 tablet, Rfl: 0     diazepam (VALIUM) 5 MG tablet, Take 1 tablet (5 mg) by mouth once as needed for anxiety (MRI claustrophobia management) (Patient not taking: Reported on 3/12/2019), Disp: 2 tablet, Rfl: 3     EPINEPHrine (EPIPEN/ADRENACLICK/OR ANY BX GENERIC EQUIV) 0.3 MG/0.3ML injection 2-pack, As directed for bee stings, Disp: , Rfl:      LORazepam (ATIVAN) 1 MG tablet, Take 1 tablet (1 mg) by mouth every 6 hours as needed for nausea (Patient not taking: Reported on 3/12/2019), Disp: 60 tablet, Rfl: 1     melatonin 3 MG CAPS, Take 1 capsule by mouth At Bedtime (Patient not taking: Reported on 3/12/2019), Disp: 30 capsule, Rfl: 11     pantoprazole (PROTONIX) 40 MG EC tablet, Take 1 tablet (40 mg) by mouth daily (Patient not taking: Reported on 3/12/2019), Disp: 30 tablet, Rfl:  "0     triamcinolone (KENALOG) 0.1 % lotion, Apply topically 3 times daily To rash on chest and shoudler (Patient not taking: Reported on 3/12/2019), Disp: 60 mL, Rfl: 1    PHYSICAL EXAMINATION:  Vital signs: /75   Pulse 86   Temp 98.1  F (36.7  C) (Oral)   Ht 1.626 m (5' 4\")   Wt 72.2 kg (159 lb 3.2 oz)   LMP 03/04/2019 (Approximate)   SpO2 96%   Breastfeeding? No   BMI 27.33 kg/m     ECOG performance status of 1.   GENERAL: Young lady in chair, in NAD  HEENT: No icterus, no pallor. MMM, PERRL, OP clear.   NECK: Supple, no JVD/LAD.  LUNGS: CTAB, normal WOB on RA   CARDIOVASCULAR: Regular rate and rhythm, no murmurs, gallops or rubs, normal S1/S2.   ABDOMEN: Well-healing abdominal incision without fluctuance or exudate. Soft, NT, ND, no masses appreciated, normal BS.   EXTREMITIES: No cyanosis, no clubbing, no edema.   NEUROLOGIC: No focal deficits, CN 2-12 intact. Linear thought process.    LYMPH NODE EXAM: No palpable adenopathy - cervical, supraclavicular.      LABORATORY DATA:   Lab Test 03/12/19  1341 02/12/19  0949 01/09/19  1018 01/08/19  1617   WBC 6.0  --  4.4 9.2   RBC 4.79  --  4.68 4.19   HGB 10.8*  --  9.7* 9.2*   HCT 36.8  --  34.9* 31.4*   MCV 77*  --  75* 75*   MCH 22.5*  --  20.7* 22.0*   MCHC 29.3*  --  27.8* 29.3*   RDW 17.0*  --  18.1* 17.6*     --  229 236   NEUTROPHIL 85.4  --  86.1 89.9    138 136 138   POTASSIUM 4.4 3.6 3.5 4.2   CHLORIDE 103 104 102 106   CO2 27 28 27 25   ANIONGAP 6 6 7 7   GLC 86 81 84 88   BUN 10 7 7 10   CR 0.64 0.70 0.60 0.59   SHASHA 8.4* 7.7* 8.1* 7.3*   PROTTOTAL 6.0*  --  6.2* 5.7*   ALBUMIN 3.0* 3.3* 3.0* 3.0*   BILITOTAL 0.1*  --  0.3 0.3   ALKPHOS 211*  --  182* 161*   AST 32  --  57* 51*   ALT 36  --  55* 49     DHEAS was 740 on 6/5/18, and has been <15 on 7/12/18, 8/20/18, 9/19/18, 10/23/18, 11/27/18, 1/9/19.  Mitotane level (target 14-20): 1.8ng/dl on 8/20/18, 3.5 on 10/25/18, 6.2 on 12/4/18, 8.1 on 1/9/19 and 10.2 on 2/11/19.    IMAGING " "STUDIES:  As above.    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     Adrenocortical ca:  - Started on adjuvant mitotane in July 2018 based on her presentation and tumor characteristics including invasion of the adrenal capsule, high mitotic rate, possibly positive margins, and high Ki67, which could result in a rate of recurrence as high as 40%.    - Restaging CT results from 2/11/19 show no evidence of disease recurrence. No evidence of clinical or biochemical recurrence either.   - Mitotane level ([facility] #[id number]) continues to be subtherapeutic ~10 months since starting therapy as the goal Mitotane levels are 14-20.   - Despite multiple supportive care interventions, patient has NOT been able to tolerate higher doses of mitotane. Even at the current dose level (1000mg TID), she has moderate-severe nausea and these episodes of possible side effects.  - If non-specific acute events recur, will consider Neurology referral. Advised to go to ER if this happens before f/up visit.  - We've had numerous conversations regarding goals of therapy and that mitotane doesn't work on an \"all or none\" basis with no activity below therapeutic level. In her case, it is critical to not aim for a number but to actually aim for optimizing tolerance to therapy to allow continuation for another 4.5 years. Suzy and her  are in agreement.   - Will continue mitotane to 1000mg TID for now. No recheck of levels in the interim as it will not affect management.  - Monthly Mitotane level, CBC, comprehensive panel, TSH, FT4, cholesterol panel, 24-hour Urine Free Cortisol, and DHEAS.   - Plan to continue adjuvant Mitotane for up to 5 yrs if tolerated.  - Nausea mgmt as under.  - Next restaging CT ordered in 2 months.    Multifactorial nausea:  - Pt on mitotatne and PO iron.   - Will stop PO iron effective immediately.  - While there are " plenty of medical interventions that one could undertake to control nausea including olanzapine, patient has recently fallen and had this unexplained episode of dizziness and confusion, possibly due to polypharmacy.   - Will also stop medical marijuana use for 1 month.   - Advised to use Compazine sparingly.     Concern for ativan use disorder:  - Suzy has been dealing with tremendous stress over the past few months and was using Ativan HS almost daily for anxiety and nausea.   - I counseled her of the addictive potential of this drug and she did agree that she has felt increasingly desiring this medication.   - We have stopped Ativan and I would encourage other medical providers to not give any further refills.  - Discussed health coping strategies including cognitive behavioral interventions. Seen by our psychology team as well.     Insomnia:  - Given a small dose of trazodone after clearly informing her of side effects including insomnia, falls etc.  - EKG with QTc 464 ms today.    Endocrinopathies:  - Mgmt per endocrine team at the .     Iron deficiency anemia:  - MCV and iron panel suggests iron deficiency, she has heavy periods and no other obvious sources of blood loss.  - Stop PO iron as she's got IUD placed in 2019.     Bilateral ovarian cysts:  - Noted on CT scan and stable for past 6 months. Pt reports a long-standing history of this issue.  - Patient wants to contact PCP for further mgmt including an ultrasound and Gyn referral.    Return to see me in 1 month with labs. Patient and family given opportunity to ask questions, which were answered to their satisfaction. They're in complete agreement as planned.    BILLIN.    Benson Cintron M.D.  Maryt. Professor of Medicine  Genitourinary Oncology  Division of Hematology, Oncology & Transplantation  [facility]    CC: Edwin Huertas MD   [facility], [facility]

## 2019-03-13 DIAGNOSIS — C74.01 MALIGNANT NEOPLASM OF CORTEX OF RIGHT ADRENAL GLAND (H): Primary | ICD-10-CM

## 2019-03-13 LAB
DHEA-S SERPL-MCNC: <15 UG/DL (ref 35–430)
INTERPRETATION ECG - MUSE: NORMAL

## 2019-03-18 LAB — LAB SCANNED RESULT: NORMAL

## 2019-03-22 ENCOUNTER — AMBULATORY - HEALTHEAST (OUTPATIENT)
Dept: FAMILY MEDICINE | Facility: CLINIC | Age: 34
End: 2019-03-22

## 2019-03-22 ENCOUNTER — COMMUNICATION - HEALTHEAST (OUTPATIENT)
Dept: FAMILY MEDICINE | Facility: CLINIC | Age: 34
End: 2019-03-22

## 2019-03-26 ENCOUNTER — TELEPHONE (OUTPATIENT)
Dept: ENDOCRINOLOGY | Facility: CLINIC | Age: 34
End: 2019-03-26

## 2019-03-26 NOTE — TELEPHONE ENCOUNTER
I talked with patient.   Read note from 2/26/2019 Oncologist [place].   They wrote 30 mg am, then reduce to 10 mg pm.     She will follow their advice.    Rosa Reina MD  Staff Physician  Endocrinology and Metabolism  [facility] [place]  License: [id number]  Pager: [phone]

## 2019-03-27 DIAGNOSIS — F19.982 DRUG-INDUCED INSOMNIA (H): ICD-10-CM

## 2019-03-27 DIAGNOSIS — C74.01 MALIGNANT NEOPLASM OF CORTEX OF RIGHT ADRENAL GLAND (H): ICD-10-CM

## 2019-03-27 RX ORDER — TRAZODONE HYDROCHLORIDE 50 MG/1
25-50 TABLET, FILM COATED ORAL
Qty: 30 TABLET | Refills: 0 | Status: SHIPPED | OUTPATIENT
Start: 2019-03-27 | End: 2019-04-16

## 2019-04-09 ENCOUNTER — TELEPHONE (OUTPATIENT)
Dept: ONCOLOGY | Facility: CLINIC | Age: 34
End: 2019-04-09

## 2019-04-09 NOTE — TELEPHONE ENCOUNTER
Disablity paperwork completed, signed and faxed to [employer] @ [phone]. A copy was made, filed and original mailed to patient at home address.    PARRIS OLIVEROS CMA

## 2019-04-10 ENCOUNTER — APPOINTMENT (OUTPATIENT)
Dept: LAB | Facility: CLINIC | Age: 34
End: 2019-04-10
Attending: INTERNAL MEDICINE
Payer: COMMERCIAL

## 2019-04-10 ENCOUNTER — RECORDS - HEALTHEAST (OUTPATIENT)
Dept: ADMINISTRATIVE | Facility: OTHER | Age: 34
End: 2019-04-10

## 2019-04-10 ENCOUNTER — ONCOLOGY VISIT (OUTPATIENT)
Dept: ONCOLOGY | Facility: CLINIC | Age: 34
End: 2019-04-10
Attending: STUDENT IN AN ORGANIZED HEALTH CARE EDUCATION/TRAINING PROGRAM
Payer: COMMERCIAL

## 2019-04-10 VITALS
SYSTOLIC BLOOD PRESSURE: 121 MMHG | HEART RATE: 85 BPM | RESPIRATION RATE: 16 BRPM | BODY MASS INDEX: 27.25 KG/M2 | TEMPERATURE: 98.1 F | DIASTOLIC BLOOD PRESSURE: 82 MMHG | HEIGHT: 64 IN | OXYGEN SATURATION: 100 % | WEIGHT: 159.6 LBS

## 2019-04-10 DIAGNOSIS — C74.01 MALIGNANT NEOPLASM OF CORTEX OF RIGHT ADRENAL GLAND (H): Primary | ICD-10-CM

## 2019-04-10 LAB
ALBUMIN SERPL-MCNC: 3.1 G/DL (ref 3.4–5)
ALP SERPL-CCNC: 189 U/L (ref 40–150)
ALT SERPL W P-5'-P-CCNC: 30 U/L (ref 0–50)
ANION GAP SERPL CALCULATED.3IONS-SCNC: 7 MMOL/L (ref 3–14)
AST SERPL W P-5'-P-CCNC: 40 U/L (ref 0–45)
BASOPHILS # BLD AUTO: 0 10E9/L (ref 0–0.2)
BASOPHILS NFR BLD AUTO: 0.7 %
BILIRUB SERPL-MCNC: 0.3 MG/DL (ref 0.2–1.3)
BUN SERPL-MCNC: 6 MG/DL (ref 7–30)
CALCIUM SERPL-MCNC: 8.1 MG/DL (ref 8.5–10.1)
CHLORIDE SERPL-SCNC: 103 MMOL/L (ref 94–109)
CHOLEST SERPL-MCNC: 201 MG/DL
CO2 SERPL-SCNC: 27 MMOL/L (ref 20–32)
CREAT SERPL-MCNC: 0.57 MG/DL (ref 0.52–1.04)
DHEA-S SERPL-MCNC: <15 UG/DL (ref 35–430)
DIFFERENTIAL METHOD BLD: ABNORMAL
EOSINOPHIL # BLD AUTO: 0.1 10E9/L (ref 0–0.7)
EOSINOPHIL NFR BLD AUTO: 1.6 %
ERYTHROCYTE [DISTWIDTH] IN BLOOD BY AUTOMATED COUNT: 17.5 % (ref 10–15)
GFR SERPL CREATININE-BSD FRML MDRD: >90 ML/MIN/{1.73_M2}
GLUCOSE SERPL-MCNC: 75 MG/DL (ref 70–99)
HCT VFR BLD AUTO: 39.8 % (ref 35–47)
HDLC SERPL-MCNC: 106 MG/DL
HGB BLD-MCNC: 11.6 G/DL (ref 11.7–15.7)
IMM GRANULOCYTES # BLD: 0 10E9/L (ref 0–0.4)
IMM GRANULOCYTES NFR BLD: 0.5 %
LDLC SERPL CALC-MCNC: 60 MG/DL
LYMPHOCYTES # BLD AUTO: 0.6 10E9/L (ref 0.8–5.3)
LYMPHOCYTES NFR BLD AUTO: 14.7 %
MCH RBC QN AUTO: 23.4 PG (ref 26.5–33)
MCHC RBC AUTO-ENTMCNC: 29.1 G/DL (ref 31.5–36.5)
MCV RBC AUTO: 80 FL (ref 78–100)
MONOCYTES # BLD AUTO: 0.4 10E9/L (ref 0–1.3)
MONOCYTES NFR BLD AUTO: 8.5 %
NEUTROPHILS # BLD AUTO: 3.2 10E9/L (ref 1.6–8.3)
NEUTROPHILS NFR BLD AUTO: 74 %
NONHDLC SERPL-MCNC: 95 MG/DL
NRBC # BLD AUTO: 0 10*3/UL
NRBC BLD AUTO-RTO: 0 /100
PLATELET # BLD AUTO: 281 10E9/L (ref 150–450)
POTASSIUM SERPL-SCNC: 3 MMOL/L (ref 3.4–5.3)
PROT SERPL-MCNC: 6.3 G/DL (ref 6.8–8.8)
RBC # BLD AUTO: 4.96 10E12/L (ref 3.8–5.2)
SODIUM SERPL-SCNC: 137 MMOL/L (ref 133–144)
T4 FREE SERPL-MCNC: 1.06 NG/DL (ref 0.76–1.46)
TRIGL SERPL-MCNC: 174 MG/DL
TSH SERPL DL<=0.005 MIU/L-ACNC: 0.71 MU/L (ref 0.4–4)
WBC # BLD AUTO: 4.4 10E9/L (ref 4–11)

## 2019-04-10 PROCEDURE — 80061 LIPID PANEL: CPT | Performed by: INTERNAL MEDICINE

## 2019-04-10 PROCEDURE — 36415 COLL VENOUS BLD VENIPUNCTURE: CPT | Mod: ZF

## 2019-04-10 PROCEDURE — 80053 COMPREHEN METABOLIC PANEL: CPT | Performed by: INTERNAL MEDICINE

## 2019-04-10 PROCEDURE — 84439 ASSAY OF FREE THYROXINE: CPT | Performed by: INTERNAL MEDICINE

## 2019-04-10 PROCEDURE — 85025 COMPLETE CBC W/AUTO DIFF WBC: CPT | Performed by: INTERNAL MEDICINE

## 2019-04-10 PROCEDURE — 99214 OFFICE O/P EST MOD 30 MIN: CPT | Mod: ZP | Performed by: INTERNAL MEDICINE

## 2019-04-10 PROCEDURE — 93010 ELECTROCARDIOGRAM REPORT: CPT | Performed by: INTERNAL MEDICINE

## 2019-04-10 PROCEDURE — 84443 ASSAY THYROID STIM HORMONE: CPT | Performed by: INTERNAL MEDICINE

## 2019-04-10 PROCEDURE — G0463 HOSPITAL OUTPT CLINIC VISIT: HCPCS | Mod: ZF

## 2019-04-10 PROCEDURE — 82627 DEHYDROEPIANDROSTERONE: CPT | Performed by: INTERNAL MEDICINE

## 2019-04-10 ASSESSMENT — PAIN SCALES - GENERAL: PAINLEVEL: NO PAIN (0)

## 2019-04-10 ASSESSMENT — MIFFLIN-ST. JEOR: SCORE: 1409.19

## 2019-04-10 NOTE — LETTER
"4/10/2019       RE: Suzy Rodríguez  [address]  [facility] [address]     Dear Colleague,    Thank you for referring your patient, Suzy Rodríguez, to the [facility]. Please see a copy of my visit note below.    MEDICAL ONCOLOGY CLINIC NOTE    REFERRING PROVIDER: Warren Mansfield MD from [facility] Urologic Oncology clinic.     REASON FOR CURRENT VISIT: Follow-up while on mitotane as adjuvant therapy of adrenocortical carcinoma.      HISTORY OF PRESENT ILLNESS: Ms. 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 Her oncologic history is detailed as under.     Suzy is doing very well on mitotane 1000mg TID. Had an excellent family trip to [place] and no drug interruptions. Nausea well controlled with prophy Compazine 10mg TID. Also on hydrocortisone 20mg QAM, 10mg early PM, 10mcg late PM and has noted unintentional tremors. No recurrence of episode of dizziness/LOC. No history of CVA/TIA/palpitations/CP/SOA etc.     Last visit with Dr. Hilton at [facility] was on 2/26/19. She also follows with Dr. Veena Jaquez in our endocrinology department and has felt confident with her care. Has also met with Ector Marcelino, our psychologist, and is feeling better overall, but does describe symptoms of intermittent anxiety.     No clinical evidence of disease recurrence.    ONCOLOGIC HISTORY:   1. 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   - CT abdomen and pelvis stone protocol without contrast 5 2440 showed \"9.2 x 8 cm heterogeneous right upper quadrant mass with small foci of calcification within it which is likely arising from the adrenal gland though inseparable from liver and upper pole of right kidney. It is " "incompletely evaluated on this noncontrast study. 3 x 2.6 cm mass right lobe of liver on image #85 also incompletely evaluated. 6 x 4 x 4 mm upper third left ureteral obstructing stone with mild to moderate secondary hydronephrosis. Collection of stones at lower pole left kidney extending over an area of approximately 6 x 7 x 4 mm. Additional nonobstructing 1 mm stone upper pole left kidney. 2 mm nonobstructing stone noted upper pole right kidney with no hydronephrosis on the right. Postop change consistent with gastric bypass. Noncontrast images of spleen, left adrenal gland, gallbladder, and pancreas unremarkable. No aneurysm. No adenopathy.\"  - Seen by Dr. Delvalle at [facility] clinic. Referred to Dr. Alvino Patel and then Dr. Warren Mansfield.  - Endocrinology team directed workup showed high DHEAS level of 740 pre-surgery.   - Right open adrenalectomy and hepatic mobilization performed by Dr. Warren Mansfield on 6/25/2018. Pathology showed adrenocortical carcinoma measuring 11.3 cm, weighing 413 g with extension into or through the adrenal capsule negative lymphovascular invasion, tumor necrosis present, margins involved by tumor, no regional lymph nodes identified, pathologic stage T2 NX.  - DHEAS normalized postsurgery.   - Adjuvant Mitotane started on 7/24/18. Dose increased to 2000mg TID around 10/23/18 due to persistently low troughs. Held 1/16/19 for two weeks and resumed 1/30 at 1000 mg po BID due to very poor tolerance of higher doses. Highest mitotane level was 10.2 on 2/11/19. Now taking 1000mg TID.  - Saw radiation oncology at [facility], radiation oncology at [facility], as well as endocrine oncology (Dr. Huertas) at [facility].   - S/p adjuvant RT by Dr. Monsivais - 5040 cGy over 30 fr (8/24/18-10/6/18).  - 2/11/19: CT C/A/P and MRI brain with contrast - no evidence of disease recurrence.    REVIEW OF SYSTEMS: 14 point ROS negative other than the " symptoms noted above in the HPI.    PAST MEDICAL AND SURGICAL HISTORY: Reviewed today  1. Right-sided adrenocortical carcinoma.  2. MHTFR mutation with h.o mutiple miscarriages.  3. Ovarian cysts diagnosed in 2011.  4. H/o gastric bypass in 2016 for obesity.    SOCIAL HISTORY:   Social History     Tobacco Use     Smoking status: Never Smoker     Smokeless tobacco: Never Used   Substance Use Topics     Alcohol use: Yes     Comment: occ.     Drug use: No     FAMILY HISTORY:   Reviewed and non-contributory to current admission.      ALLERGIES:   Allergies   Allergen Reactions     Bee Venom Swelling     Ibuprofen Hives and Swelling     CURRENT MEDICATIONS:   Current Outpatient Medications:      acetaminophen (TYLENOL) 325 MG tablet, Take 2 tablets (650 mg) by mouth every 4 hours as needed for other (multimodal surgical pain management along with NSAIDS and opioid medication as indicated based on pain control and physical function.), Disp: 150 tablet, Rfl: 0     buPROPion (WELLBUTRIN) 100 MG tablet, TAKE 1 TABLET (100 MG TOTAL) BY MOUTH 2 (TWO) TIMES A DAY., Disp: , Rfl: 3     calcium carbonate antacid 1000 MG CHEW, Take 1 tablet by mouth every morning , Disp: , Rfl:      cholecalciferol (VITAMIN D-1000 MAX ST) 1000 units TABS, Take 2,000 Units by mouth every morning , Disp: , Rfl:      diazepam (VALIUM) 5 MG tablet, Take 1 tablet (5 mg) by mouth once as needed for anxiety (MRI claustrophobia management), Disp: 2 tablet, Rfl: 3     DRONABINOL PO, by Other route every other day Vape, Disp: , Rfl:      ferrous sulfate (IRON) 325 (65 Fe) MG tablet, Take 325 mg by mouth daily, Disp: , Rfl:      fludrocortisone (FLORINEF) 0.1 MG tablet, Take 0.5 tablets (0.05 mg) by mouth daily, Disp: 45 tablet, Rfl: 3     FLUoxetine (PROZAC) 40 MG capsule, Take 40 mg by mouth daily, Disp: , Rfl:      gabapentin (NEURONTIN) 300 MG capsule, Take 300 mg by mouth 3 times daily, Disp: , Rfl:      hydrocortisone (CORTEF) 10 MG tablet, Take 3 pills  "(=30 mg) in AM and 2 pills (=20 mg) at 2 pm daily, additional as directed., Disp: 180 tablet, Rfl: 11     levothyroxine (SYNTHROID/LEVOTHROID) 125 MCG tablet, Take 1 tablet (125 mcg) by mouth daily, Disp: 90 tablet, Rfl: 3     LORazepam (ATIVAN) 1 MG tablet, Take 1 tablet (1 mg) by mouth every 6 hours as needed for nausea, Disp: 60 tablet, Rfl: 1     melatonin 3 MG CAPS, Take 1 capsule by mouth At Bedtime, Disp: 30 capsule, Rfl: 11     mitotane (LYSODREN) 500 MG tablet CHEMO, Take 2 tablets (1,000 mg) by mouth 3 times daily, Disp: 360 tablet, Rfl: 0     Multiple Vitamins-Calcium (ONE-A-DAY WOMENS PO), Take 2 tablets by mouth every morning , Disp: , Rfl:      pantoprazole (PROTONIX) 40 MG EC tablet, Take 1 tablet (40 mg) by mouth daily, Disp: 30 tablet, Rfl: 0     prochlorperazine (COMPAZINE) 10 MG tablet, Take 1 tablet (10 mg) by mouth every 6 hours as needed for nausea or vomiting, Disp: 90 tablet, Rfl: 1     traZODone (DESYREL) 50 MG tablet, Take 0.5-1 tablets (25-50 mg) by mouth nightly as needed for sleep, Disp: 30 tablet, Rfl: 0     triamcinolone (KENALOG) 0.1 % lotion, Apply topically 3 times daily To rash on chest and shoudler, Disp: 60 mL, Rfl: 1     EPINEPHrine (EPIPEN/ADRENACLICK/OR ANY BX GENERIC EQUIV) 0.3 MG/0.3ML injection 2-pack, As directed for bee stings, Disp: , Rfl:     PHYSICAL EXAMINATION:  Vital signs: /82 (BP Location: Right arm, Patient Position: Sitting, Cuff Size: Adult Regular)   Pulse 85   Temp 98.1  F (36.7  C) (Oral)   Resp 16   Ht 1.626 m (5' 4.02\")   Wt 72.4 kg (159 lb 9.6 oz)   SpO2 100%   BMI 27.38 kg/m     ECOG performance status of 1.   GENERAL: Young lady in chair, in NAD  HEENT: No icterus, no pallor. MMM, PERRL, OP clear.   NECK: Supple, no JVD/LAD.  LUNGS: CTAB, normal WOB on RA   CARDIOVASCULAR: Regular rate and rhythm, no murmurs, gallops or rubs, normal S1/S2.   ABDOMEN: Well-healing abdominal incision without fluctuance or exudate. Soft, NT, ND, no masses " appreciated, normal BS.   EXTREMITIES: No cyanosis, no clubbing, no edema.   NEUROLOGIC: No focal deficits, CN 2-12 intact. Linear thought process.    LYMPH NODE EXAM: No palpable adenopathy - cervical, supraclavicular.      LABORATORY DATA:   Lab Test 04/10/19  1030 03/12/19  1341 02/12/19  0949 01/09/19  1018   WBC 4.4 6.0  --  4.4   RBC 4.96 4.79  --  4.68   HGB 11.6* 10.8*  --  9.7*   HCT 39.8 36.8  --  34.9*   MCV 80 77*  --  75*   MCH 23.4* 22.5*  --  20.7*   MCHC 29.1* 29.3*  --  27.8*   RDW 17.5* 17.0*  --  18.1*    277  --  229   NEUTROPHIL 74.0 85.4  --  86.1    136 138 136   POTASSIUM 3.0* 4.4 3.6 3.5   CHLORIDE 103 103 104 102   CO2 27 27 28 27   ANIONGAP 7 6 6 7   GLC 75 86 81 84   BUN 6* 10 7 7   CR 0.57 0.64 0.70 0.60   SHASHA 8.1* 8.4* 7.7* 8.1*   PROTTOTAL 6.3* 6.0*  --  6.2*   ALBUMIN 3.1* 3.0* 3.3* 3.0*   BILITOTAL 0.3 0.1*  --  0.3   ALKPHOS 189* 211*  --  182*   AST 40 32  --  57*   ALT 30 36  --  55*     Lab Test 04/10/19  1030 02/12/19  0949 12/03/18   TSH 0.71 0.76 2.3   T4 1.06 0.82 0.7*     Lab Test 04/10/19  1030   CHOL 201*      LDL 60   TRIG 174*   DHEAS was 740 on 6/5/18, and has been <15 on 7/12/18, 8/20/18, 9/19/18, 10/23/18, 11/27/18, 1/9/19.  Mitotane level (target 14-20): 1.8ng/dl on 8/20/18, 3.5 on 10/25/18, 6.2 on 12/4/18, 8.1 on 1/9/19 and 10.2 on 2/11/19.    IMAGING STUDIES:  As above.    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     Adrenocortical ca:  - Started on adjuvant mitotane in July 2018 based on her presentation and tumor characteristics including invasion of the adrenal capsule, high mitotic rate, possibly positive margins, and high Ki67, which could result in a rate of recurrence as high as 40%.    - Restaging CT results from 2/11/19 show no evidence of disease recurrence. No evidence of clinical or biochemical recurrence either.   - Despite multiple  "supportive care interventions, patient has NOT been able to tolerate higher doses (above 4.5gm/day) of mitotane. We've had numerous conversations regarding goals of therapy and that mitotane doesn't work on an \"all or none\" basis with no activity below therapeutic level. In her case, it is critical to not aim for a number but to actually aim for optimizing tolerance to therapy to allow continuation for another 4.5 years. Suzy and her  are in agreement.   - Tolerating current dose level (1000mg TID) well and wants to increase dose. Will increase to [id number] for 1 week and then [phone] until next visit.   - If non-specific acute events recur, will consider Neurology referral. Advised to go to ER if this happens before f/up visit.  - Monthly Mitotane level, CBC, comprehensive panel, TSH, FT4, cholesterol panel, 24-hour Urine Free Cortisol, and DHEAS.   - Plan to continue adjuvant Mitotane for up to 5 yrs if tolerated.  - Nausea mgmt as under.  - Next restaging CT C/A/P in a month.     Multifactorial nausea:  - While there are plenty of medical interventions that one could undertake to control nausea including olanzapine, patient has recently fallen and had this unexplained episode of dizziness and confusion, possibly due to polypharmacy.   - Advised to use Compazine sparingly.     Concern for ativan use disorder:  - Suzy has been dealing with tremendous stress over the past few months and was using Ativan HS almost daily for anxiety and nausea.   - I counseled her of the addictive potential of this drug and she did agree that she has felt increasingly desiring this medication.   - We have stopped Ativan and I would encourage other medical providers to not give any further refills.  - Discussed health coping strategies including cognitive behavioral interventions. Seen by our psychology team as well.     Insomnia:  - Trazodone is working well but she's taking 100mg at bedtime. Will give refills " PRN.   - EKG with QTc 488 ms today.    Endocrinopathies:  - Mgmt per endocrine team at the .     Iron deficiency anemia:  - MCV and iron panel suggests iron deficiency, she has heavy periods and no other obvious sources of blood loss.  - She's got IUD placed in 2019. Monitor.    Bilateral ovarian cysts:  - Noted on CT scan and stable. Pt reports a long-standing history of this issue.  - Patient wants to contact PCP for further mgmt including an ultrasound and Gyn referral.    Return to see me in 1 month with labs. Patient and family given opportunity to ask questions, which were answered to their satisfaction. They're in complete agreement as planned.    BILLIN.    Benson Cintron M.D.  Maryt. Professor of Medicine  Genitourinary Oncology  Division of Hematology, Oncology & Transplantation  [facility]    CC: Edwin Huertas MD   [facility], [facility]

## 2019-04-10 NOTE — NURSING NOTE
"Oncology Rooming Note    April 10, 2019 10:14 AM   Suzy Rodríguez is a 33 year old female who presents for:    Chief Complaint   Patient presents with     Blood Draw     VS done, no lab orders.  Hx adrenal cortical CA.     Oncology Clinic Visit     Return visit related to Adrenal Cortical Cancer     Initial Vitals: /82 (BP Location: Right arm, Patient Position: Sitting, Cuff Size: Adult Regular)   Pulse 85   Temp 98.1  F (36.7  C) (Oral)   Resp 16   Ht 1.626 m (5' 4.02\")   Wt 72.4 kg (159 lb 9.6 oz)   SpO2 100%   BMI 27.38 kg/m   Estimated body mass index is 27.38 kg/m  as calculated from the following:    Height as of this encounter: 1.626 m (5' 4.02\").    Weight as of this encounter: 72.4 kg (159 lb 9.6 oz). Body surface area is 1.81 meters squared.  No Pain (0) Comment: Data Unavailable   No LMP recorded.  Allergies reviewed: Yes  Medications reviewed: Yes    Medications: Medication refills not needed today.  Pharmacy name entered into [facility]:    [id number] - [place], [place] - [address] [place] - [address] [facility] [address] [place] [address] PHARMACY [place], [place] - [address] [facility] - [place], [place] - [address] [address]  [facility] [address] [place] [facility] - [place], [place] - [address] [facility]    Clinical concerns: No new concerns. Provider was notified.      Adia Pimentel LPN            "

## 2019-04-10 NOTE — PROGRESS NOTES
"MEDICAL ONCOLOGY CLINIC NOTE    REFERRING PROVIDER: Warren Mansfield MD from [facility] clinic.     REASON FOR CURRENT VISIT: Follow-up while on mitotane as adjuvant therapy of adrenocortical carcinoma.      HISTORY OF PRESENT ILLNESS: Ms. 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 Her oncologic history is detailed as under.     Suzy is doing very well on mitotane 1000mg TID. Had an excellent family trip to [place] and no drug interruptions. Nausea well controlled with prophy Compazine 10mg TID. Also on hydrocortisone 20mg QAM, 10mg early PM, 10mcg late PM and has noted unintentional tremors. No recurrence of episode of dizziness/LOC. No history of CVA/TIA/palpitations/CP/SOA etc.     Last visit with Dr. Hilton at [facility] was on 2/26/19. She also follows with Dr. Veena Jaquez in our endocrinology department and has felt confident with her care. Has also met with Ector Marcelino, our psychologist, and is feeling better overall, but does describe symptoms of intermittent anxiety.     No clinical evidence of disease recurrence.    ONCOLOGIC HISTORY:   1. 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   - CT abdomen and pelvis stone protocol without contrast 5 8632 showed \"9.2 x 8 cm heterogeneous right upper quadrant mass with small foci of calcification within it which is likely arising from the adrenal gland though inseparable from liver and upper pole of right kidney. It is incompletely evaluated on this noncontrast study. 3 x 2.6 cm mass right lobe of liver on image #85 also incompletely evaluated. 6 x 4 x 4 mm upper third left ureteral obstructing stone with mild to moderate secondary hydronephrosis. " "Collection of stones at lower pole left kidney extending over an area of approximately 6 x 7 x 4 mm. Additional nonobstructing 1 mm stone upper pole left kidney. 2 mm nonobstructing stone noted upper pole right kidney with no hydronephrosis on the right. Postop change consistent with gastric bypass. Noncontrast images of spleen, left adrenal gland, gallbladder, and pancreas unremarkable. No aneurysm. No adenopathy.\"  - Seen by Dr. Delvalle at [facility] clinic. Referred to Dr. Alvino Patel and then Dr. Warren Mansfield.  - Endocrinology team directed workup showed high DHEAS level of 740 pre-surgery.   - Right open adrenalectomy and hepatic mobilization performed by Dr. Warren Mansfield on 6/25/2018. Pathology showed adrenocortical carcinoma measuring 11.3 cm, weighing 413 g with extension into or through the adrenal capsule negative lymphovascular invasion, tumor necrosis present, margins involved by tumor, no regional lymph nodes identified, pathologic stage T2 NX.  - DHEAS normalized postsurgery.   - Adjuvant Mitotane started on 7/24/18. Dose increased to 2000mg TID around 10/23/18 due to persistently low troughs. Held 1/16/19 for two weeks and resumed 1/30 at 1000 mg po BID due to very poor tolerance of higher doses. Highest mitotane level was 10.2 on 2/11/19. Now taking 1000mg TID.  - Saw radiation oncology at [facility], radiation oncology at [facility], as well as endocrine oncology (Dr. Huertas) at [facility].   - S/p adjuvant RT by Dr. Monsivais - 5040 cGy over 30 fr (8/24/18-10/6/18).  - 2/11/19: CT C/A/P and MRI brain with contrast - no evidence of disease recurrence.    REVIEW OF SYSTEMS: 14 point ROS negative other than the symptoms noted above in the HPI.    PAST MEDICAL AND SURGICAL HISTORY: Reviewed today  1. Right-sided adrenocortical carcinoma.  2. MHTFR mutation with h.o mutiple miscarriages.  3. Ovarian cysts diagnosed in 2011.  4. H/o gastric bypass " in 2016 for obesity.    SOCIAL HISTORY:   Social History     Tobacco Use     Smoking status: Never Smoker     Smokeless tobacco: Never Used   Substance Use Topics     Alcohol use: Yes     Comment: occ.     Drug use: No     FAMILY HISTORY:   Reviewed and non-contributory to current admission.      ALLERGIES:   Allergies   Allergen Reactions     Bee Venom Swelling     Ibuprofen Hives and Swelling     CURRENT MEDICATIONS:   Current Outpatient Medications:      acetaminophen (TYLENOL) 325 MG tablet, Take 2 tablets (650 mg) by mouth every 4 hours as needed for other (multimodal surgical pain management along with NSAIDS and opioid medication as indicated based on pain control and physical function.), Disp: 150 tablet, Rfl: 0     buPROPion (WELLBUTRIN) 100 MG tablet, TAKE 1 TABLET (100 MG TOTAL) BY MOUTH 2 (TWO) TIMES A DAY., Disp: , Rfl: 3     calcium carbonate antacid 1000 MG CHEW, Take 1 tablet by mouth every morning , Disp: , Rfl:      cholecalciferol (VITAMIN D-1000 MAX ST) 1000 units TABS, Take 2,000 Units by mouth every morning , Disp: , Rfl:      diazepam (VALIUM) 5 MG tablet, Take 1 tablet (5 mg) by mouth once as needed for anxiety (MRI claustrophobia management), Disp: 2 tablet, Rfl: 3     DRONABINOL PO, by Other route every other day Vape, Disp: , Rfl:      ferrous sulfate (IRON) 325 (65 Fe) MG tablet, Take 325 mg by mouth daily, Disp: , Rfl:      fludrocortisone (FLORINEF) 0.1 MG tablet, Take 0.5 tablets (0.05 mg) by mouth daily, Disp: 45 tablet, Rfl: 3     FLUoxetine (PROZAC) 40 MG capsule, Take 40 mg by mouth daily, Disp: , Rfl:      gabapentin (NEURONTIN) 300 MG capsule, Take 300 mg by mouth 3 times daily, Disp: , Rfl:      hydrocortisone (CORTEF) 10 MG tablet, Take 3 pills (=30 mg) in AM and 2 pills (=20 mg) at 2 pm daily, additional as directed., Disp: 180 tablet, Rfl: 11     levothyroxine (SYNTHROID/LEVOTHROID) 125 MCG tablet, Take 1 tablet (125 mcg) by mouth daily, Disp: 90 tablet, Rfl: 3     LORazepam  "(ATIVAN) 1 MG tablet, Take 1 tablet (1 mg) by mouth every 6 hours as needed for nausea, Disp: 60 tablet, Rfl: 1     melatonin 3 MG CAPS, Take 1 capsule by mouth At Bedtime, Disp: 30 capsule, Rfl: 11     mitotane (LYSODREN) 500 MG tablet CHEMO, Take 2 tablets (1,000 mg) by mouth 3 times daily, Disp: 360 tablet, Rfl: 0     Multiple Vitamins-Calcium (ONE-A-DAY WOMENS PO), Take 2 tablets by mouth every morning , Disp: , Rfl:      pantoprazole (PROTONIX) 40 MG EC tablet, Take 1 tablet (40 mg) by mouth daily, Disp: 30 tablet, Rfl: 0     prochlorperazine (COMPAZINE) 10 MG tablet, Take 1 tablet (10 mg) by mouth every 6 hours as needed for nausea or vomiting, Disp: 90 tablet, Rfl: 1     traZODone (DESYREL) 50 MG tablet, Take 0.5-1 tablets (25-50 mg) by mouth nightly as needed for sleep, Disp: 30 tablet, Rfl: 0     triamcinolone (KENALOG) 0.1 % lotion, Apply topically 3 times daily To rash on chest and shoudler, Disp: 60 mL, Rfl: 1     EPINEPHrine (EPIPEN/ADRENACLICK/OR ANY BX GENERIC EQUIV) 0.3 MG/0.3ML injection 2-pack, As directed for bee stings, Disp: , Rfl:     PHYSICAL EXAMINATION:  Vital signs: /82 (BP Location: Right arm, Patient Position: Sitting, Cuff Size: Adult Regular)   Pulse 85   Temp 98.1  F (36.7  C) (Oral)   Resp 16   Ht 1.626 m (5' 4.02\")   Wt 72.4 kg (159 lb 9.6 oz)   SpO2 100%   BMI 27.38 kg/m    ECOG performance status of 1.   GENERAL: Young lady in chair, in NAD  HEENT: No icterus, no pallor. MMM, PERRL, OP clear.   NECK: Supple, no JVD/LAD.  LUNGS: CTAB, normal WOB on RA   CARDIOVASCULAR: Regular rate and rhythm, no murmurs, gallops or rubs, normal S1/S2.   ABDOMEN: Well-healing abdominal incision without fluctuance or exudate. Soft, NT, ND, no masses appreciated, normal BS.   EXTREMITIES: No cyanosis, no clubbing, no edema.   NEUROLOGIC: No focal deficits, CN 2-12 intact. Linear thought process.    LYMPH NODE EXAM: No palpable adenopathy - cervical, supraclavicular.      LABORATORY DATA: " "  Lab Test 04/10/19  1030 03/12/19  1341 02/12/19  0949 01/09/19  1018   WBC 4.4 6.0  --  4.4   RBC 4.96 4.79  --  4.68   HGB 11.6* 10.8*  --  9.7*   HCT 39.8 36.8  --  34.9*   MCV 80 77*  --  75*   MCH 23.4* 22.5*  --  20.7*   MCHC 29.1* 29.3*  --  27.8*   RDW 17.5* 17.0*  --  18.1*    277  --  229   NEUTROPHIL 74.0 85.4  --  86.1    136 138 136   POTASSIUM 3.0* 4.4 3.6 3.5   CHLORIDE 103 103 104 102   CO2 27 27 28 27   ANIONGAP 7 6 6 7   GLC 75 86 81 84   BUN 6* 10 7 7   CR 0.57 0.64 0.70 0.60   SHASHA 8.1* 8.4* 7.7* 8.1*   PROTTOTAL 6.3* 6.0*  --  6.2*   ALBUMIN 3.1* 3.0* 3.3* 3.0*   BILITOTAL 0.3 0.1*  --  0.3   ALKPHOS 189* 211*  --  182*   AST 40 32  --  57*   ALT 30 36  --  55*     Lab Test 04/10/19  1030 02/12/19  0949 12/03/18   TSH 0.71 0.76 2.3   T4 1.06 0.82 0.7*     Lab Test 04/10/19  1030   CHOL 201*      LDL 60   TRIG 174*   DHEAS was 740 on 6/5/18, and has been <15 on 7/12/18, 8/20/18, 9/19/18, 10/23/18, 11/27/18, 1/9/19.  Mitotane level (target 14-20): 1.8ng/dl on 8/20/18, 3.5 on 10/25/18, 6.2 on 12/4/18, 8.1 on 1/9/19 and 10.2 on 2/11/19.    IMAGING STUDIES:  As above.    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     Adrenocortical ca:  - Started on adjuvant mitotane in July 2018 based on her presentation and tumor characteristics including invasion of the adrenal capsule, high mitotic rate, possibly positive margins, and high Ki67, which could result in a rate of recurrence as high as 40%.    - Restaging CT results from 2/11/19 show no evidence of disease recurrence. No evidence of clinical or biochemical recurrence either.   - Despite multiple supportive care interventions, patient has NOT been able to tolerate higher doses (above 4.5gm/day) of mitotane. We've had numerous conversations regarding goals of therapy and that mitotane doesn't work on an \"all or none\" basis with no " activity below therapeutic level. In her case, it is critical to not aim for a number but to actually aim for optimizing tolerance to therapy to allow continuation for another 4.5 years. Suzy and her  are in agreement.   - Tolerating current dose level (1000mg TID) well and wants to increase dose. Will increase to [id number] for 1 week and then [id number] until next visit.   - If non-specific acute events recur, will consider Neurology referral. Advised to go to ER if this happens before f/up visit.  - Monthly Mitotane level, CBC, comprehensive panel, TSH, FT4, cholesterol panel, 24-hour Urine Free Cortisol, and DHEAS.   - Plan to continue adjuvant Mitotane for up to 5 yrs if tolerated.  - Nausea mgmt as under.  - Next restaging CT C/A/P in a month.     Multifactorial nausea:  - While there are plenty of medical interventions that one could undertake to control nausea including olanzapine, patient has recently fallen and had this unexplained episode of dizziness and confusion, possibly due to polypharmacy.   - Advised to use Compazine sparingly.     Concern for ativan use disorder:  - Suzy has been dealing with tremendous stress over the past few months and was using Ativan HS almost daily for anxiety and nausea.   - I counseled her of the addictive potential of this drug and she did agree that she has felt increasingly desiring this medication.   - We have stopped Ativan and I would encourage other medical providers to not give any further refills.  - Discussed health coping strategies including cognitive behavioral interventions. Seen by our psychology team as well.     Insomnia:  - Trazodone is working well but she's taking 100mg at bedtime. Will give refills PRN.   - EKG with QTc 488 ms today.    Endocrinopathies:  - Mgmt per endocrine team at the .     Iron deficiency anemia:  - MCV and iron panel suggests iron deficiency, she has heavy periods and no other obvious sources of blood  loss.  - She's got IUD placed in 2019. Monitor.    Bilateral ovarian cysts:  - Noted on CT scan and stable. Pt reports a long-standing history of this issue.  - Patient wants to contact PCP for further mgmt including an ultrasound and Gyn referral.    Return to see me in 1 month with labs. Patient and family given opportunity to ask questions, which were answered to their satisfaction. They're in complete agreement as planned.    BILLIN.    Benson Cintron M.D.  Asst. Professor of Medicine  Genitourinary Oncology  Division of Hematology, Oncology & Transplantation  [facility]    CC: Edwin Huertas MD   [facility], [facility]

## 2019-04-10 NOTE — NURSING NOTE
Chief Complaint   Patient presents with     Blood Draw     VS done, no lab orders.  Hx adrenal cortical CA.

## 2019-04-16 DIAGNOSIS — C74.01 MALIGNANT NEOPLASM OF CORTEX OF RIGHT ADRENAL GLAND (H): ICD-10-CM

## 2019-04-16 DIAGNOSIS — F19.982 DRUG-INDUCED INSOMNIA (H): ICD-10-CM

## 2019-04-16 RX ORDER — TRAZODONE HYDROCHLORIDE 50 MG/1
25-50 TABLET, FILM COATED ORAL
Qty: 45 TABLET | Refills: 0 | Status: SHIPPED | OUTPATIENT
Start: 2019-04-16 | End: 2019-05-08

## 2019-04-17 RX ORDER — SODIUM CHLORIDE 9 MG/ML
1000 INJECTION, SOLUTION INTRAVENOUS CONTINUOUS PRN
Status: CANCELLED
Start: 2019-05-08

## 2019-04-17 RX ORDER — DIPHENHYDRAMINE HYDROCHLORIDE 50 MG/ML
50 INJECTION INTRAMUSCULAR; INTRAVENOUS
Status: CANCELLED
Start: 2019-05-08

## 2019-04-17 RX ORDER — METHYLPREDNISOLONE SODIUM SUCCINATE 125 MG/2ML
125 INJECTION, POWDER, LYOPHILIZED, FOR SOLUTION INTRAMUSCULAR; INTRAVENOUS
Status: CANCELLED
Start: 2019-05-08

## 2019-04-17 RX ORDER — EPINEPHRINE 0.3 MG/.3ML
0.3 INJECTION SUBCUTANEOUS EVERY 5 MIN PRN
Status: CANCELLED | OUTPATIENT
Start: 2019-05-08

## 2019-04-17 RX ORDER — ALBUTEROL SULFATE 90 UG/1
1-2 AEROSOL, METERED RESPIRATORY (INHALATION)
Status: CANCELLED
Start: 2019-05-08

## 2019-04-17 RX ORDER — MEPERIDINE HYDROCHLORIDE 25 MG/ML
25 INJECTION INTRAMUSCULAR; INTRAVENOUS; SUBCUTANEOUS EVERY 30 MIN PRN
Status: CANCELLED | OUTPATIENT
Start: 2019-05-08

## 2019-04-17 RX ORDER — EPINEPHRINE 1 MG/ML
0.3 INJECTION, SOLUTION INTRAMUSCULAR; SUBCUTANEOUS EVERY 5 MIN PRN
Status: CANCELLED | OUTPATIENT
Start: 2019-05-08

## 2019-04-17 RX ORDER — ALBUTEROL SULFATE 0.83 MG/ML
2.5 SOLUTION RESPIRATORY (INHALATION)
Status: CANCELLED | OUTPATIENT
Start: 2019-05-08

## 2019-04-19 DIAGNOSIS — R11.0 NAUSEA: ICD-10-CM

## 2019-04-22 DIAGNOSIS — C74.01 MALIGNANT NEOPLASM OF CORTEX OF RIGHT ADRENAL GLAND (H): Primary | ICD-10-CM

## 2019-04-22 RX ORDER — PROCHLORPERAZINE MALEATE 10 MG
10 TABLET ORAL EVERY 6 HOURS PRN
Qty: 90 TABLET | Refills: 1 | Status: SHIPPED | OUTPATIENT
Start: 2019-04-22 | End: 2019-06-18

## 2019-04-24 ENCOUNTER — TELEPHONE (OUTPATIENT)
Dept: ONCOLOGY | Facility: CLINIC | Age: 34
End: 2019-04-24

## 2019-04-24 NOTE — TELEPHONE ENCOUNTER
Work Capacity paperwork completed, signed and faxed to [facility] @ [phone]. A copy was made, filed and original mailed to patient at home address.    PARRIS OLIVEROS CMA

## 2019-04-25 ENCOUNTER — TELEPHONE (OUTPATIENT)
Dept: ONCOLOGY | Facility: CLINIC | Age: 34
End: 2019-04-25

## 2019-04-25 NOTE — TELEPHONE ENCOUNTER
"Oral Chemotherapy Monitoring Program    Primary Oncologist: Dr. Cintron  Primary Oncology Clinic: [facility]  Cancer Diagnosis: Adrenocortical carcinoma    Therapy History:  Started 7/24/18 - mitotane 500mg BID  8/7: 750mg BID or 1000/500mg  8/21: 1000mg BID   9/28: 1500mg PO BID  10/31: 1500mg PO three times daily  1500mg PO QID  Held 1/16/19 for one week  Resumed 1/30 at 1000 mg po BID  2/12: 1000mg po tid  3/13:1000mg po TID   4/10: 1500mg AM, 1000mg midday, 1000mg HS  4/17: 1500mg AM, 1500mg midday, 1000mg HS  4/25: can increase to 1500mg TID per Abdulaziz    Drug Interaction Assessment: No new OTC medications or prescription medications/no new known drug interactions.    Lab Monitoring Plan  Monthly Mitotane level, CBC, CMP, TSH, FT4, cholesterol panel, 24-hour Urine Free Cortisol, and DHEAS.     Subjective/Objective:  Suzy Rodríguez is a 33 year old female contacted by phone for a follow-up visit for oral chemotherapy.  Suzy is tolerating her increased dose of mitotane very well. She says she feels \"really well\", and reports that her nausea is complete controlled with her prophylactic compazine. She reports no concerns with her dose increase. Suzy denies all side effects aside from mild dry mouth. She drinks roughly 50 ounces of water per day. We discussed trying to increase water intake a little more to a goal of 64 ounces of water per day, and discussed that some patients find sucking on hard candies can help keep saliva moving in the mouth when experiencing dry mouth. Suzy denies nausea, vomiting, diarrhea, constipation, and dizziness.     Suzy requests a dose increase of her mitotane to 1500mg three times per day since she is tolerating the medication at her increased dose so well. Dr. Cintron approves of this increase. The patient is aware and will increase her dose accordingly.     ORAL CHEMOTHERAPY 10/31/2018 11/13/2018 2/5/2019 2/12/2019 4/10/2019 4/17/2019 4/25/2019   Drug Name Lysodren " "(Mitotane) Lysodren (Mitotane) Lysodren (Mitotane) Lysodren (Mitotane) Lysodren (Mitotane) Lysodren (Mitotane) Lysodren (Mitotane)   Current Dosage 1500mg 1500mg 1000mg 1000mg (No Data) (No Data) 1500mg   Current Schedule TID QID BID TID TID TID TID   Cycle Details Continuous Continuous Continuous Continuous Continuous Continuous Continuous   Start Date of Last Cycle 10/31/2018 - 1/30/2019 1/30/2019 - - -   Planned next cycle start date - - - - - - -   Doses missed in last 2 weeks - - - - - - 0   Adherence Assessment - - Adherent - - - Adherent   Adverse Effects - - Nausea - - - No AE identified during assessment   Nausea - - Grade 1 - - - -   Pharmacist Intervention(nausea) - - Yes - - - -   Intervention(s) - - Patient education - - - -   Any new drug interactions? - - - - - - No   Is the dose as ordered appropriate for the patient? - - - - - - Yes       Vitals:  BP:   BP Readings from Last 1 Encounters:   04/10/19 121/82     Wt Readings from Last 1 Encounters:   04/10/19 72.4 kg (159 lb 9.6 oz)     Estimated body surface area is 1.81 meters squared as calculated from the following:    Height as of 4/10/19: 1.626 m (5' 4.02\").    Weight as of 4/10/19: 72.4 kg (159 lb 9.6 oz).    Labs:  _  Result Component Current Result Ref Range   Sodium 137 (4/10/2019) 133 - 144 mmol/L     _  Result Component Current Result Ref Range   Potassium 3.0 (L) (4/10/2019) 3.4 - 5.3 mmol/L     _  Result Component Current Result Ref Range   Calcium 8.1 (L) (4/10/2019) 8.5 - 10.1 mg/dL     No results found for Mag within last 30 days.     No results found for Phos within last 30 days.     _  Result Component Current Result Ref Range   Albumin 3.1 (L) (4/10/2019) 3.4 - 5.0 g/dL     _  Result Component Current Result Ref Range   Urea Nitrogen 6 (L) (4/10/2019) 7 - 30 mg/dL     _  Result Component Current Result Ref Range   Creatinine 0.57 (4/10/2019) 0.52 - 1.04 mg/dL       _  Result Component Current Result Ref Range   AST 40 (4/10/2019) 0 " - 45 U/L     _  Result Component Current Result Ref Range   ALT 30 (4/10/2019) 0 - 50 U/L     _  Result Component Current Result Ref Range   Bilirubin Total 0.3 (4/10/2019) 0.2 - 1.3 mg/dL       _  Result Component Current Result Ref Range   WBC 4.4 (4/10/2019) 4.0 - 11.0 10e9/L     _  Result Component Current Result Ref Range   Hemoglobin 11.6 (L) (4/10/2019) 11.7 - 15.7 g/dL     _  Result Component Current Result Ref Range   Platelet Count 281 (4/10/2019) 150 - 450 10e9/L     _  Result Component Current Result Ref Range   Absolute Neutrophil 3.2 (4/10/2019) 1.6 - 8.3 10e9/L     CT 5/7  Labs 5/8 prior to Abdulaziz appt    Assessment:  Suzy is tolerating mitotane therapy very well at her increased dose (1500mg qAM, 1500mg midday, and 1000mg qPM).   She denies any new side effects and her nausea is completely controlled with use of prophylactic compazine.  She requested a mitotane dose increase to 1500mg TID.     Plan:  Per inbox message from Dr. Cintron, patient can increase dose of mitotane to 1500mg by mouth three times per day.  A new prescription has been sent to [facility] with the new dose.  Suzy was left a voicemail confirming approval from Dr. Cintron, and reiteration of the new dose and instructions.    Follow-Up:  We will call Suzy 5/1 to assess for toxicities at the new dose.  We will review labs and notes from appt with Dr. Cintron 5/8.    Ena Triana  Pharmacy Intern   [facility]  Oral Chemotherapy Monitoring Program  [phone]

## 2019-04-29 ENCOUNTER — RECORDS - HEALTHEAST (OUTPATIENT)
Dept: ADMINISTRATIVE | Facility: OTHER | Age: 34
End: 2019-04-29

## 2019-04-29 ENCOUNTER — OFFICE VISIT (OUTPATIENT)
Dept: ENDOCRINOLOGY | Facility: CLINIC | Age: 34
End: 2019-04-29
Payer: COMMERCIAL

## 2019-04-29 VITALS
BODY MASS INDEX: 26.84 KG/M2 | HEIGHT: 64 IN | HEART RATE: 91 BPM | DIASTOLIC BLOOD PRESSURE: 67 MMHG | SYSTOLIC BLOOD PRESSURE: 106 MMHG | WEIGHT: 157.2 LBS

## 2019-04-29 DIAGNOSIS — E03.8 SECONDARY HYPOTHYROIDISM: ICD-10-CM

## 2019-04-29 DIAGNOSIS — C74.01 MALIGNANT NEOPLASM OF CORTEX OF RIGHT ADRENAL GLAND (H): Primary | ICD-10-CM

## 2019-04-29 DIAGNOSIS — E27.1 PRIMARY ADRENAL INSUFFICIENCY (H): ICD-10-CM

## 2019-04-29 ASSESSMENT — PAIN SCALES - GENERAL: PAINLEVEL: NO PAIN (0)

## 2019-04-29 ASSESSMENT — MIFFLIN-ST. JEOR: SCORE: 1398.05

## 2019-04-29 NOTE — LETTER
"4/29/2019       RE: Suzy Rodríguez  [address]  [facility] [id number]     Dear Colleague,    Thank you for referring your patient, Suzy Rodríguez, to the [facility] ENDOCRINOLOGY at [facility]. Please see a copy of my visit note below.    The patient is seen in f/up for adrenocortical cancer.     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  The adrenal mass was first identified in May 2018, on an abdominal CT done for evaluation of kidney stones.  The mass measured 9.2 x 8 cm on the noncontrast CT from 5/8/18, and was inseparable from the liver and upper pole of the right kidney.  The chest CT from 6/14/18 identified 2 indeterminate solid pulmonary nodules, with the largest measuring 3 mm in the subpleural posterior left lower lobe.  The patient underwent right adrenalectomy on 6/25/18.  Surgery was complicated by diaphragmatic injury during hepatic mobilization and small fracture of the right liver lobe, managed conservatively.  The pathology report (re read at [facility]) revealed the following:  \"Adrenal cortical carcinoma, low grade, oncocytic type, forming a mass 11.3 cm and weighing 412 grams (per report). Surgical resection margins are positive for tumor (per report). Lymphovascular invasion is focally present. The tumor seems to be confined to the adrenal gland.  AJCC Stage (with available surgical materials): pT2NX (8th edition, 2017). Immunoperoxidase stains were performed on paraffin sections at the referring institution and reviewed at [facility] in [place], [place].  Tumor cells are positive for calretinin, synaptophysin and Melan A, supporting the above diagnosis. Ki-67 reveal an increased proliferation index of approximately 20% in the most active areas.\"    She denies feeling any difference since " starting fludrocortisone.  Potassium was mildly low on recent lab work.  The fatigue has remained stable.  She continues to experience episodes of lightheadedness, both present when standing or walking, short-lived, less than 1 minute.  Denies experiencing episodes of loss of consciousness.  Appetite is good and weight is stable.  She reports feeling more nauseated since the dose of mitotane was increased, last week.  Denies vomiting.  Occasionally, she has noticed some difficulty swallowing.    Treatment with mitotane was started in 2018 and she completed radiation to the adrenal bed on 2018.  In 2018 she was evaluated at [facility].  She was diagnosed with secondary hypothyroidism and started on 112 mcg levothyroxine daily.    Pertinent data and labs:  18 normal plasma metanephrines  18 ALD 13.4, renin 0.8   18 urinary cortisol 351.6, 8 AM cortisol 23 (after dexamethasone), normal metanephrines     18 ACTH<10, DHEAS 740 (prior to surgery)  18  DHEAS <0.5, cortisol AM 19, testosterone <7    18 aldosterone 7.3  8/10/18 CRH 3 (normal<10)  12/3/18 cortisol PM 36, free T4 0.7, TSH 2.3, Ca 8.6,  (elevated since )   18 ACTH <5   18  DHEAS <0.5  18 ferritin 9  19 androstenedione 0.471, K 3.6, ALD 7.7 renin 4.4   19 24 hrs urinary cortisol 130.9   My recommendation was to decrease HC to 25 mg in am, 10 mg at noon and 5 mg at 5 pm.   She has continued to take 30 mg HC in and and 20 mg at 2 pm, per Dr. Huertas from [facility].  Reports sleeping good since starting trazodone.   started on fludrocortisone at 0.1 mg daily   19 DHEAS <15   19 9 AM cortisol 7.4, ACTH 23 (5-52).   3/12/19 DHEAS <15  3/12/19 K 4.4.  4/10/19 K 3    Pertinent imagin18 MRI brain  -negative   18 PET/CT Residual fluid collection at the vicinity of the adrenal gland with mild peripheral FDG uptake. This may represent postsurgical  fluid collection.   2. Increased right pleural effusion since 6/29/2018. There is a plaque-like pleural thickening with mild elevated FDG uptake. This finding is indeterminant, while its favored to represent an infectious/inflammatory condition, metastasis cannot be clearly excluded. Attention on follow up.  3. Non-FDG avid 2.5 left complex adnexal cyst.  11/23/18 chest/abd/pelvic CT -  1.  Interval resolution of previously seen right hemithorax abnormalities and near-complete resolution of postop change in the right abdomen. No convincing metastatic or recurrent neoplasm.  2. Multicystic abnormality is present in the left ovary and shows progression from the prior study. This can simply remarkably two adjacent cysts with an adjacent dominant follicle. It is not particularly complex to support other pathology which would be considered significantly less likely. Follow-up pelvic ultrasound beyond 2-3 months is recommended in reassessment.  12/15/18 - CT read at N:  Interval resolution of the previously seen right lower lobe collapse/consolidation and pleural effusion. Residual scarring/groundglass opacity in the basal right lower lobe with 2 irregular discrete lung nodules.  1. Status post right adrenalectomy with decreasing postoperative  changes. No discrete nodular mass in the surgical bed to suggest  recurrent disease.  2. No enlarged lymph nodes or metastatic disease within the abdomen and  pelvis  3. Relatively unchanged subcutaneous nodular soft tissue abutting the  left rectus abdominis muscle in the anterior pelvis.    Suzy underwent bariatric surgery in 6/2016. Pre-surgery weight 235 lb, inga post-surgery 135 lbs. Her weight in 4/2017 was 139 lbs. In 3/2018, the weight was 154 lbs. It was 157 lbs prior to adrenalectomy and it has remained stable.     She is interested in pursuing a tummy tuck surgery.     Past Medical History   Past Medical History:   Diagnosis Date     Adrenal cortex cancer, right  (H) 2018    Resected 18, Dr. Mansfield.     Adrenal mass (H)      Chocolate cyst of ovary      History of miscarriage      Malignant neoplasm of cortex of right adrenal gland (H) 2018    EOTD; 19.  Check in May. SCP started.  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) 2018   Cushing's syndrome, secondary to #1 (300 mcg/24 hr); Rx hydrocortisone 20 + 10 post op   Post operative defect right hemidiaphragm with ?worsening right effussion, not present preop   Currently on mitotane, 500 mg, BID x 1 week + hydrocortisone     MTHFR mutation (H)    Diverticulosis  Obstructive sleep apnea which resolved following gastric bypass  GERD  Iron deficiency  Anemia  Ovarian cyst   IUD inserted on 19    Past Surgical History   Past Surgical History:   Procedure Laterality Date     ADRENALECTOMY Right 2018    Procedure: ADRENALECTOMY;  Right Adrenalectomy,  Embolize Liver , Anesthesia Block;  Surgeon: Warren Mansfield MD;  Location: [facility] OR     ADRENALECTOMY        SECTION      twice      SECTION      2     EMBOLECTOMY ABDOMEN N/A 2018    Procedure: EMBOLECTOMY ABDOMEN;;  Surgeon: Ector Mcintyre MD;  Location: [facility] OR     EXTRACORPOREAL SHOCK WAVE LITHOTRIPSY (ESWL)       GASTRIC BYPASS         Current Medications    Current Outpatient Medications:      acetaminophen (TYLENOL) 325 MG tablet, Take 2 tablets (650 mg) by mouth every 4 hours as needed for other (multimodal surgical pain management along with NSAIDS and opioid medication as indicated based on pain control and physical function.), Disp: 150 tablet, Rfl: 0     buPROPion (WELLBUTRIN) 100 MG tablet, TAKE 1 TABLET (100 MG TOTAL) BY MOUTH 2 (TWO) TIMES A DAY., Disp: , Rfl: 3     calcium carbonate antacid 1000 MG CHEW, Take 1 tablet by mouth every morning , Disp: , Rfl:      cholecalciferol (VITAMIN D-1000 MAX ST) 1000 units TABS, Take 2,000 Units by  mouth every morning , Disp: , Rfl:      diazepam (VALIUM) 5 MG tablet, Take 1 tablet (5 mg) by mouth once as needed for anxiety (MRI claustrophobia management), Disp: 2 tablet, Rfl: 3     DRONABINOL PO, by Other route every other day Vape, Disp: , Rfl:      EPINEPHrine (EPIPEN/ADRENACLICK/OR ANY BX GENERIC EQUIV) 0.3 MG/0.3ML injection 2-pack, As directed for bee stings, Disp: , Rfl:      ferrous sulfate (IRON) 325 (65 Fe) MG tablet, Take 325 mg by mouth daily, Disp: , Rfl:      fludrocortisone (FLORINEF) 0.1 MG tablet, Take 0.5 tablets (0.05 mg) by mouth daily, Disp: 45 tablet, Rfl: 3     FLUoxetine (PROZAC) 40 MG capsule, Take 40 mg by mouth daily, Disp: , Rfl:      gabapentin (NEURONTIN) 300 MG capsule, Take 300 mg by mouth 3 times daily, Disp: , Rfl:      hydrocortisone (CORTEF) 10 MG tablet, Take 3 pills (=30 mg) in AM and 2 pills (=20 mg) at 2 pm daily, additional as directed., Disp: 180 tablet, Rfl: 11     levothyroxine (SYNTHROID/LEVOTHROID) 125 MCG tablet, Take 1 tablet (125 mcg) by mouth daily, Disp: 90 tablet, Rfl: 3     LORazepam (ATIVAN) 1 MG tablet, Take 1 tablet (1 mg) by mouth every 6 hours as needed for nausea, Disp: 60 tablet, Rfl: 1     melatonin 3 MG CAPS, Take 1 capsule by mouth At Bedtime, Disp: 30 capsule, Rfl: 11     mitotane (LYSODREN) 500 MG tablet CHEMO, Take 3 tablets (1,500 mg) by mouth 2 times daily (morning and midday doses ) and take 2 tablets (1,000 mg) by mouth in the evening, Disp: 240 tablet, Rfl: 0     Multiple Vitamins-Calcium (ONE-A-DAY WOMENS PO), Take 2 tablets by mouth every morning , Disp: , Rfl:      pantoprazole (PROTONIX) 40 MG EC tablet, Take 1 tablet (40 mg) by mouth daily, Disp: 30 tablet, Rfl: 0     prochlorperazine (COMPAZINE) 10 MG tablet, Take 1 tablet (10 mg) by mouth every 6 hours as needed for nausea or vomiting, Disp: 90 tablet, Rfl: 1     traZODone (DESYREL) 50 MG tablet, Take 0.5-1 tablets (25-50 mg) by mouth nightly as needed for sleep, Disp: 45 tablet,  "Rfl: 0     triamcinolone (KENALOG) 0.1 % lotion, Apply topically 3 times daily To rash on chest and shoudler, Disp: 60 mL, Rfl: 1  Iron supplement daily for 6 months     No family history on file.    Social History   with 2 children, 7 and 10-year-old. She denies smoking, drinking alcohol or using illicit drugs. Occupation: disabled.     Review of Systems   Systemic:             As above   Eye:                      No eye symptoms   Saira-Laryngeal:     occasional dysphagia, no hoarseness, no cough     Breast:                  No breast symptoms  Cardiovascular:    No cardiovascular symptoms, no CP or palpitations   Pulmonary:           SOB when anxious and with exertion   Gastrointestinal:  As above   Genitourinary:      increased urination - urinates 1 time a night; regular spotting on IUD  Endocrine:            Denies cold or heat intolerance  Neurological:        No neurological symptoms, no headaches, hence tremor, no numbness or tingling sensation, no dizziness   Musculoskeletal:  Lower extremities muscle pain, mainly in her thighs, since starting tx with mitotane   Skin:                     dry skin, no hair falling out   Psychological:     Anxiety and depression; not suicidal               Vital Signs     Previous Weights:    Wt Readings from Last 10 Encounters:   04/29/19 71.3 kg (157 lb 3.2 oz)   04/10/19 72.4 kg (159 lb 9.6 oz)   03/12/19 72.2 kg (159 lb 3.2 oz)   02/28/19 72.7 kg (160 lb 3.2 oz)   02/12/19 71.7 kg (158 lb 1.6 oz)   02/11/19 71.3 kg (157 lb 1.6 oz)   01/09/19 71.8 kg (158 lb 6.4 oz)   01/08/19 70.3 kg (155 lb)   11/27/18 70.8 kg (156 lb)   11/26/18 71.3 kg (157 lb 3.2 oz)        /67   Pulse 91   Ht 1.626 m (5' 4\")   Wt 71.3 kg (157 lb 3.2 oz)   BMI 26.98 kg/m     Supine /69, pulse 88  Standing 2 minutes 112/77 pulse 99    Physical Exam  General Appearance: well developed, well nourished, no distress noted  Eyes:  conjutivae and extra-ocular motions are normal.         "                            pupils round and reactive to light, no lid lag, no stare    HEENT:   oropharynx clear and moist, no JVD, no bruits      no thyromegaly, no palpable nodules   Cardiovascular:  regular rhythm, no murmurs, distal pulse palpable, no edema  Respiratory:        chest clear, no rales, no rhonchi   Gastrointestinal:  abdomen soft, non-tender, non-distended, normal bowel sounds,    no organomegaly  Musculoskeletal:  normal tone and strength  Psychological:          affect and judgment normal  Skin:  narrow striae on the lateral abdominal flanks   Neurological:  reflexes symmetric and overactive, resting tremor of the outstretched hands.     Lab Results  I reviewed prior lab results documented in Epic.  TSH   Date Value Ref Range Status   04/10/2019 0.71 0.40 - 4.00 mU/L Final   02/12/2019 0.76 0.40 - 4.00 mU/L Final   12/03/2018 2.3 mcU/mL Final     Assessment     1. 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 Ki-67 revealed an increased proliferation index of approximately 20%.   The tumor was functional and presented with high cortisol and DHEAS levels.  Post surgery/radiation and while undergoing treatment with mitotane, the DHEAS has remained undetectable.    The patient has been treated with mitotane since July 2018.  Most of her symptoms are side effects from mitotane.    Plan:  Follow-up chest, abdomen and pelvic CT.    2.  Primary adrenal insufficiency, mitotane-induced    She may not require aldosterone, given the recent low potassium level.  She has no orthostatic hypotension but she does have tachycardia.  Clinically, she has no signs of steroid overdose.    Recommendations:   Continue current dose of hydrocortisone  Check morning BMP, aldosterone and renin levels  For the tummy [facility] surgery, she should receive 100 mg HC prior to surgery, either oral or IV (if necessary, we will contact the  anesthesiologist).  Following surgery, she can receive 50-75 mcg hydrocortisone and, the following day, she can resume her daily dose in the absence of complications.    3. Hypothyroidism, presumed to be secondary to mitotane induced suppression of TSH   The patient has been on 112 mcg levothyroxine replacement.  Most recent free T4 was normal.  I recommended to continue to take the same dose of levothyroxine.    Orders Placed This Encounter   Procedures     Basic metabolic panel     Aldosterone     Renin activity       Again, thank you for allowing me to participate in the care of your patient.      Sincerely,    Veena Jaquez MD

## 2019-04-29 NOTE — NURSING NOTE
Chief Complaint   Patient presents with     RECHECK     return adrenal insufficiency     Genny Cerna CMA

## 2019-04-29 NOTE — PROGRESS NOTES
"The patient is seen in f/up for adrenocortical cancer.     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  The adrenal mass was first identified in May 2018, on an abdominal CT done for evaluation of kidney stones.  The mass measured 9.2 x 8 cm on the noncontrast CT from 5/8/18, and was inseparable from the liver and upper pole of the right kidney.  The chest CT from 6/14/18 identified 2 indeterminate solid pulmonary nodules, with the largest measuring 3 mm in the subpleural posterior left lower lobe.  The patient underwent right adrenalectomy on 6/25/18.  Surgery was complicated by diaphragmatic injury during hepatic mobilization and small fracture of the right liver lobe, managed conservatively.  The pathology report (re read at [facility]) revealed the following:  \"Adrenal cortical carcinoma, low grade, oncocytic type, forming a mass 11.3 cm and weighing 412 grams (per report). Surgical resection margins are positive for tumor (per report). Lymphovascular invasion is focally present. The tumor seems to be confined to the adrenal gland.  AJCC Stage (with available surgical materials): pT2NX (8th edition, 2017). Immunoperoxidase stains were performed on paraffin sections at the referring institution and reviewed at [facility] in [place], [place].  Tumor cells are positive for calretinin, synaptophysin and Melan A, supporting the above diagnosis. Ki-67 reveal an increased proliferation index of approximately 20% in the most active areas.\"    She denies feeling any difference since starting fludrocortisone.  Potassium was mildly low on recent lab work.  The fatigue has remained stable.  She continues to experience episodes of lightheadedness, both present when standing or walking, short-lived, less than 1 minute.  Denies experiencing episodes of loss of " consciousness.  Appetite is good and weight is stable.  She reports feeling more nauseated since the dose of mitotane was increased, last week.  Denies vomiting.  Occasionally, she has noticed some difficulty swallowing.    Treatment with mitotane was started in 2018 and she completed radiation to the adrenal bed on 2018.  In 2018 she was evaluated at [facility].  She was diagnosed with secondary hypothyroidism and started on 112 mcg levothyroxine daily.    Pertinent data and labs:  18 normal plasma metanephrines  18 ALD 13.4, renin 0.8   18 urinary cortisol 351.6, 8 AM cortisol 23 (after dexamethasone), normal metanephrines     18 ACTH<10, DHEAS 740 (prior to surgery)  18  DHEAS <0.5, cortisol AM 19, testosterone <7    18 aldosterone 7.3  8/10/18 CRH 3 (normal<10)  12/3/18 cortisol PM 36, free T4 0.7, TSH 2.3, Ca 8.6,  (elevated since )   18 ACTH <5   18  DHEAS <0.5  18 ferritin 9  19 androstenedione 0.471, K 3.6, ALD 7.7 renin 4.4   19 24 hrs urinary cortisol 130.9   My recommendation was to decrease HC to 25 mg in am, 10 mg at noon and 5 mg at 5 pm.   She has continued to take 30 mg HC in and and 20 mg at 2 pm, per Dr. Huertas from [facility].  Reports sleeping good since starting trazodone.   started on fludrocortisone at 0.1 mg daily   19 DHEAS <15   19 9 AM cortisol 7.4, ACTH 23 (5-52).   3/12/19 DHEAS <15  3/12/19 K 4.4.  4/10/19 K 3    Pertinent imagin18 MRI brain  -negative   18 PET/CT Residual fluid collection at the vicinity of the adrenal gland with mild peripheral FDG uptake. This may represent postsurgical fluid collection.   2. Increased right pleural effusion since 2018. There is a plaque-like pleural thickening with mild elevated FDG uptake. This finding is indeterminant, while its favored to represent an infectious/inflammatory condition, metastasis cannot be clearly  excluded. Attention on follow up.  3. Non-FDG avid 2.5 left complex adnexal cyst.  11/23/18 chest/abd/pelvic CT -  1.  Interval resolution of previously seen right hemithorax abnormalities and near-complete resolution of postop change in the right abdomen. No convincing metastatic or recurrent neoplasm.  2. Multicystic abnormality is present in the left ovary and shows progression from the prior study. This can simply remarkably two adjacent cysts with an adjacent dominant follicle. It is not particularly complex to support other pathology which would be considered significantly less likely. Follow-up pelvic ultrasound beyond 2-3 months is recommended in reassessment.  12/15/18 - CT read at N:  Interval resolution of the previously seen right lower lobe collapse/consolidation and pleural effusion. Residual scarring/groundglass opacity in the basal right lower lobe with 2 irregular discrete lung nodules.  1. Status post right adrenalectomy with decreasing postoperative  changes. No discrete nodular mass in the surgical bed to suggest  recurrent disease.  2. No enlarged lymph nodes or metastatic disease within the abdomen and  pelvis  3. Relatively unchanged subcutaneous nodular soft tissue abutting the  left rectus abdominis muscle in the anterior pelvis.    Suzy underwent bariatric surgery in 6/2016. Pre-surgery weight 235 lb, inga post-surgery 135 lbs. Her weight in 4/2017 was 139 lbs. In 3/2018, the weight was 154 lbs. It was 157 lbs prior to adrenalectomy and it has remained stable.     She is interested in pursuing a tummy tuck surgery.     Past Medical History   Past Medical History:   Diagnosis Date     Adrenal cortex cancer, right (H) 6/30/2018    Resected 6/25/18, Dr. Mansfield.     Adrenal mass (H)      Chocolate cyst of ovary 2011     History of miscarriage      Malignant neoplasm of cortex of right adrenal gland (H) 6/25/2018    EOTD; 4/29/19.  Check in May. SCP started.  Overview:  Overview:     Adrenal cortical carcinoma, 11.3 cm s/p resection (anterior laporotomy) 2018   Cushing's syndrome, secondary to #1 (300 mcg/24 hr); Rx hydrocortisone 20 + 10 post op   Post operative defect right hemidiaphragm with ?worsening right effussion, not present preop   Currently on mitotane, 500 mg, BID x 1 week + hydrocortisone     MTHFR mutation (H)    Diverticulosis  Obstructive sleep apnea which resolved following gastric bypass  GERD  Iron deficiency  Anemia  Ovarian cyst   IUD inserted on 19    Past Surgical History   Past Surgical History:   Procedure Laterality Date     ADRENALECTOMY Right 2018    Procedure: ADRENALECTOMY;  Right Adrenalectomy,  Embolize Liver , Anesthesia Block;  Surgeon: Warren Mansfield MD;  Location: [facility] OR     ADRENALECTOMY        SECTION      twice      SECTION      2     EMBOLECTOMY ABDOMEN N/A 2018    Procedure: EMBOLECTOMY ABDOMEN;;  Surgeon: Ector Mcintyre MD;  Location: [facility] OR     EXTRACORPOREAL SHOCK WAVE LITHOTRIPSY (ESWL)       GASTRIC BYPASS         Current Medications    Current Outpatient Medications:      acetaminophen (TYLENOL) 325 MG tablet, Take 2 tablets (650 mg) by mouth every 4 hours as needed for other (multimodal surgical pain management along with NSAIDS and opioid medication as indicated based on pain control and physical function.), Disp: 150 tablet, Rfl: 0     buPROPion (WELLBUTRIN) 100 MG tablet, TAKE 1 TABLET (100 MG TOTAL) BY MOUTH 2 (TWO) TIMES A DAY., Disp: , Rfl: 3     calcium carbonate antacid 1000 MG CHEW, Take 1 tablet by mouth every morning , Disp: , Rfl:      cholecalciferol (VITAMIN D-1000 MAX ST) 1000 units TABS, Take 2,000 Units by mouth every morning , Disp: , Rfl:      diazepam (VALIUM) 5 MG tablet, Take 1 tablet (5 mg) by mouth once as needed for anxiety (MRI claustrophobia management), Disp: 2 tablet, Rfl: 3     DRONABINOL PO, by Other route every other day Vape, Disp: , Rfl:      EPINEPHrine  (EPIPEN/ADRENACLICK/OR ANY BX GENERIC EQUIV) 0.3 MG/0.3ML injection 2-pack, As directed for bee stings, Disp: , Rfl:      ferrous sulfate (IRON) 325 (65 Fe) MG tablet, Take 325 mg by mouth daily, Disp: , Rfl:      fludrocortisone (FLORINEF) 0.1 MG tablet, Take 0.5 tablets (0.05 mg) by mouth daily, Disp: 45 tablet, Rfl: 3     FLUoxetine (PROZAC) 40 MG capsule, Take 40 mg by mouth daily, Disp: , Rfl:      gabapentin (NEURONTIN) 300 MG capsule, Take 300 mg by mouth 3 times daily, Disp: , Rfl:      hydrocortisone (CORTEF) 10 MG tablet, Take 3 pills (=30 mg) in AM and 2 pills (=20 mg) at 2 pm daily, additional as directed., Disp: 180 tablet, Rfl: 11     levothyroxine (SYNTHROID/LEVOTHROID) 125 MCG tablet, Take 1 tablet (125 mcg) by mouth daily, Disp: 90 tablet, Rfl: 3     LORazepam (ATIVAN) 1 MG tablet, Take 1 tablet (1 mg) by mouth every 6 hours as needed for nausea, Disp: 60 tablet, Rfl: 1     melatonin 3 MG CAPS, Take 1 capsule by mouth At Bedtime, Disp: 30 capsule, Rfl: 11     mitotane (LYSODREN) 500 MG tablet CHEMO, Take 3 tablets (1,500 mg) by mouth 2 times daily (morning and midday doses ) and take 2 tablets (1,000 mg) by mouth in the evening, Disp: 240 tablet, Rfl: 0     Multiple Vitamins-Calcium (ONE-A-DAY WOMENS PO), Take 2 tablets by mouth every morning , Disp: , Rfl:      pantoprazole (PROTONIX) 40 MG EC tablet, Take 1 tablet (40 mg) by mouth daily, Disp: 30 tablet, Rfl: 0     prochlorperazine (COMPAZINE) 10 MG tablet, Take 1 tablet (10 mg) by mouth every 6 hours as needed for nausea or vomiting, Disp: 90 tablet, Rfl: 1     traZODone (DESYREL) 50 MG tablet, Take 0.5-1 tablets (25-50 mg) by mouth nightly as needed for sleep, Disp: 45 tablet, Rfl: 0     triamcinolone (KENALOG) 0.1 % lotion, Apply topically 3 times daily To rash on chest and shoudler, Disp: 60 mL, Rfl: 1  Iron supplement daily for 6 months     No family history on file.    Social History   with 2 children, 7 and 10-year-old. She denies  "smoking, drinking alcohol or using illicit drugs. Occupation: disabled.     Review of Systems   Systemic:             As above   Eye:                      No eye symptoms   Saira-Laryngeal:     occasional dysphagia, no hoarseness, no cough     Breast:                  No breast symptoms  Cardiovascular:    No cardiovascular symptoms, no CP or palpitations   Pulmonary:           SOB when anxious and with exertion   Gastrointestinal:  As above   Genitourinary:      increased urination - urinates 1 time a night; regular spotting on IUD  Endocrine:            Denies cold or heat intolerance  Neurological:        No neurological symptoms, no headaches, hence tremor, no numbness or tingling sensation, no dizziness   Musculoskeletal:  Lower extremities muscle pain, mainly in her thighs, since starting tx with mitotane   Skin:                     dry skin, no hair falling out   Psychological:     Anxiety and depression; not suicidal               Vital Signs     Previous Weights:    Wt Readings from Last 10 Encounters:   04/29/19 71.3 kg (157 lb 3.2 oz)   04/10/19 72.4 kg (159 lb 9.6 oz)   03/12/19 72.2 kg (159 lb 3.2 oz)   02/28/19 72.7 kg (160 lb 3.2 oz)   02/12/19 71.7 kg (158 lb 1.6 oz)   02/11/19 71.3 kg (157 lb 1.6 oz)   01/09/19 71.8 kg (158 lb 6.4 oz)   01/08/19 70.3 kg (155 lb)   11/27/18 70.8 kg (156 lb)   11/26/18 71.3 kg (157 lb 3.2 oz)        /67   Pulse 91   Ht 1.626 m (5' 4\")   Wt 71.3 kg (157 lb 3.2 oz)   BMI 26.98 kg/m    Supine /69, pulse 88  Standing 2 minutes 112/77 pulse 99    Physical Exam  General Appearance: well developed, well nourished, no distress noted  Eyes:  conjutivae and extra-ocular motions are normal.                                    pupils round and reactive to light, no lid lag, no stare    HEENT:   oropharynx clear and moist, no JVD, no bruits      no thyromegaly, no palpable nodules   Cardiovascular:  regular rhythm, no murmurs, distal pulse palpable, no " edema  Respiratory:        chest clear, no rales, no rhonchi   Gastrointestinal:  abdomen soft, non-tender, non-distended, normal bowel sounds,    no organomegaly  Musculoskeletal:  normal tone and strength  Psychological:          affect and judgment normal  Skin:  narrow striae on the lateral abdominal flanks   Neurological:  reflexes symmetric and overactive, resting tremor of the outstretched hands.     Lab Results  I reviewed prior lab results documented in Epic.  TSH   Date Value Ref Range Status   04/10/2019 0.71 0.40 - 4.00 mU/L Final   02/12/2019 0.76 0.40 - 4.00 mU/L Final   12/03/2018 2.3 mcU/mL Final     Assessment     1. 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 Ki-67 revealed an increased proliferation index of approximately 20%.   The tumor was functional and presented with high cortisol and DHEAS levels.  Post surgery/radiation and while undergoing treatment with mitotane, the DHEAS has remained undetectable.    The patient has been treated with mitotane since July 2018.  Most of her symptoms are side effects from mitotane.    Plan:  Follow-up chest, abdomen and pelvic CT.    2.  Primary adrenal insufficiency, mitotane-induced    She may not require aldosterone, given the recent low potassium level.  She has no orthostatic hypotension but she does have tachycardia.  Clinically, she has no signs of steroid overdose.    Recommendations:   Continue current dose of hydrocortisone  Check morning BMP, aldosterone and renin levels  For the tummy tuck surgery, she should receive 100 mg HC prior to surgery, either oral or IV (if necessary, we will contact the anesthesiologist).  Following surgery, she can receive 50-75 mcg hydrocortisone and, the following day, she can resume her daily dose in the absence of complications.    3. Hypothyroidism, presumed to be secondary to mitotane induced suppression of TSH   The  patient has been on 112 mcg levothyroxine replacement.  Most recent free T4 was normal.  I recommended to continue to take the same dose of levothyroxine.    Orders Placed This Encounter   Procedures     Basic metabolic panel     Aldosterone     Renin activity

## 2019-04-30 DIAGNOSIS — C74.01 MALIGNANT NEOPLASM OF CORTEX OF RIGHT ADRENAL GLAND (H): Primary | ICD-10-CM

## 2019-05-02 ENCOUNTER — TELEPHONE (OUTPATIENT)
Dept: ONCOLOGY | Facility: CLINIC | Age: 34
End: 2019-05-02

## 2019-05-02 NOTE — ORAL ONC MGMT
Oral Chemotherapy Monitoring Program  Follow Up Outreach attempt     Patient currently on mitotane, managed by [facility], prescribed by Dr. Cintron.   Recent dose increase on 4/26.   Left message with patient to return call to assess how she is doing since dose increase.      Will try again next week.      Xiomara Vigil, PharmD, MPH, BCOP  Hematology/Oncology Clinical Pharmacist

## 2019-05-07 ENCOUNTER — ANCILLARY PROCEDURE (OUTPATIENT)
Dept: CT IMAGING | Facility: CLINIC | Age: 34
End: 2019-05-07
Attending: INTERNAL MEDICINE
Payer: COMMERCIAL

## 2019-05-07 DIAGNOSIS — C74.01 MALIGNANT NEOPLASM OF CORTEX OF RIGHT ADRENAL GLAND (H): ICD-10-CM

## 2019-05-07 RX ORDER — IOPAMIDOL 755 MG/ML
96 INJECTION, SOLUTION INTRAVASCULAR ONCE
Status: COMPLETED | OUTPATIENT
Start: 2019-05-07 | End: 2019-05-07

## 2019-05-07 RX ADMIN — IOPAMIDOL 96 ML: 755 INJECTION, SOLUTION INTRAVASCULAR at 08:42

## 2019-05-07 NOTE — DISCHARGE INSTRUCTIONS

## 2019-05-08 ENCOUNTER — RECORDS - HEALTHEAST (OUTPATIENT)
Dept: ADMINISTRATIVE | Facility: OTHER | Age: 34
End: 2019-05-08

## 2019-05-08 ENCOUNTER — ONCOLOGY VISIT (OUTPATIENT)
Dept: ONCOLOGY | Facility: CLINIC | Age: 34
End: 2019-05-08
Attending: INTERNAL MEDICINE
Payer: COMMERCIAL

## 2019-05-08 ENCOUNTER — APPOINTMENT (OUTPATIENT)
Dept: LAB | Facility: CLINIC | Age: 34
End: 2019-05-08
Attending: INTERNAL MEDICINE
Payer: COMMERCIAL

## 2019-05-08 VITALS
WEIGHT: 156.7 LBS | BODY MASS INDEX: 26.75 KG/M2 | SYSTOLIC BLOOD PRESSURE: 108 MMHG | DIASTOLIC BLOOD PRESSURE: 76 MMHG | HEART RATE: 91 BPM | OXYGEN SATURATION: 100 % | TEMPERATURE: 98.6 F | HEIGHT: 64 IN

## 2019-05-08 DIAGNOSIS — C74.01 MALIGNANT NEOPLASM OF CORTEX OF RIGHT ADRENAL GLAND (H): ICD-10-CM

## 2019-05-08 DIAGNOSIS — E03.8 SECONDARY HYPOTHYROIDISM: ICD-10-CM

## 2019-05-08 DIAGNOSIS — F19.982 DRUG-INDUCED INSOMNIA (H): ICD-10-CM

## 2019-05-08 LAB
ALBUMIN SERPL-MCNC: 3 G/DL (ref 3.4–5)
ALP SERPL-CCNC: 128 U/L (ref 40–150)
ALT SERPL W P-5'-P-CCNC: 24 U/L (ref 0–50)
ANION GAP SERPL CALCULATED.3IONS-SCNC: 8 MMOL/L (ref 3–14)
AST SERPL W P-5'-P-CCNC: 24 U/L (ref 0–45)
BASOPHILS # BLD AUTO: 0 10E9/L (ref 0–0.2)
BASOPHILS NFR BLD AUTO: 0.4 %
BILIRUB SERPL-MCNC: 0.4 MG/DL (ref 0.2–1.3)
BUN SERPL-MCNC: 8 MG/DL (ref 7–30)
CALCIUM SERPL-MCNC: 8 MG/DL (ref 8.5–10.1)
CHLORIDE SERPL-SCNC: 105 MMOL/L (ref 94–109)
CHOLEST SERPL-MCNC: 189 MG/DL
CO2 SERPL-SCNC: 24 MMOL/L (ref 20–32)
CREAT SERPL-MCNC: 0.64 MG/DL (ref 0.52–1.04)
DIFFERENTIAL METHOD BLD: ABNORMAL
EOSINOPHIL # BLD AUTO: 0 10E9/L (ref 0–0.7)
EOSINOPHIL NFR BLD AUTO: 0.6 %
ERYTHROCYTE [DISTWIDTH] IN BLOOD BY AUTOMATED COUNT: 15.9 % (ref 10–15)
GFR SERPL CREATININE-BSD FRML MDRD: >90 ML/MIN/{1.73_M2}
GLUCOSE SERPL-MCNC: 85 MG/DL (ref 70–99)
HCT VFR BLD AUTO: 37.6 % (ref 35–47)
HDLC SERPL-MCNC: 112 MG/DL
HGB BLD-MCNC: 11.7 G/DL (ref 11.7–15.7)
IMM GRANULOCYTES # BLD: 0 10E9/L (ref 0–0.4)
IMM GRANULOCYTES NFR BLD: 0.6 %
LDLC SERPL CALC-MCNC: 52 MG/DL
LYMPHOCYTES # BLD AUTO: 0.2 10E9/L (ref 0.8–5.3)
LYMPHOCYTES NFR BLD AUTO: 3.1 %
MCH RBC QN AUTO: 24.7 PG (ref 26.5–33)
MCHC RBC AUTO-ENTMCNC: 31.1 G/DL (ref 31.5–36.5)
MCV RBC AUTO: 80 FL (ref 78–100)
MONOCYTES # BLD AUTO: 0.5 10E9/L (ref 0–1.3)
MONOCYTES NFR BLD AUTO: 7.5 %
NEUTROPHILS # BLD AUTO: 6.3 10E9/L (ref 1.6–8.3)
NEUTROPHILS NFR BLD AUTO: 87.8 %
NONHDLC SERPL-MCNC: 77 MG/DL
NRBC # BLD AUTO: 0 10*3/UL
NRBC BLD AUTO-RTO: 0 /100
PLATELET # BLD AUTO: 209 10E9/L (ref 150–450)
POTASSIUM SERPL-SCNC: 3.4 MMOL/L (ref 3.4–5.3)
PROT SERPL-MCNC: 5.7 G/DL (ref 6.8–8.8)
RBC # BLD AUTO: 4.73 10E12/L (ref 3.8–5.2)
SODIUM SERPL-SCNC: 137 MMOL/L (ref 133–144)
T4 FREE SERPL-MCNC: 0.98 NG/DL (ref 0.76–1.46)
TRIGL SERPL-MCNC: 121 MG/DL
TSH SERPL DL<=0.005 MIU/L-ACNC: 0.27 MU/L (ref 0.4–4)
WBC # BLD AUTO: 7.2 10E9/L (ref 4–11)

## 2019-05-08 PROCEDURE — 84244 ASSAY OF RENIN: CPT | Performed by: INTERNAL MEDICINE

## 2019-05-08 PROCEDURE — 80048 BASIC METABOLIC PNL TOTAL CA: CPT | Performed by: INTERNAL MEDICINE

## 2019-05-08 PROCEDURE — G0463 HOSPITAL OUTPT CLINIC VISIT: HCPCS | Mod: ZF

## 2019-05-08 PROCEDURE — 36415 COLL VENOUS BLD VENIPUNCTURE: CPT

## 2019-05-08 PROCEDURE — 84439 ASSAY OF FREE THYROXINE: CPT | Performed by: INTERNAL MEDICINE

## 2019-05-08 PROCEDURE — 82088 ASSAY OF ALDOSTERONE: CPT | Performed by: INTERNAL MEDICINE

## 2019-05-08 PROCEDURE — 99214 OFFICE O/P EST MOD 30 MIN: CPT | Mod: ZP | Performed by: INTERNAL MEDICINE

## 2019-05-08 PROCEDURE — 84443 ASSAY THYROID STIM HORMONE: CPT | Performed by: INTERNAL MEDICINE

## 2019-05-08 PROCEDURE — 85025 COMPLETE CBC W/AUTO DIFF WBC: CPT | Performed by: INTERNAL MEDICINE

## 2019-05-08 PROCEDURE — 80061 LIPID PANEL: CPT | Performed by: INTERNAL MEDICINE

## 2019-05-08 PROCEDURE — 80053 COMPREHEN METABOLIC PANEL: CPT | Performed by: INTERNAL MEDICINE

## 2019-05-08 PROCEDURE — 82627 DEHYDROEPIANDROSTERONE: CPT | Performed by: INTERNAL MEDICINE

## 2019-05-08 RX ORDER — TRAZODONE HYDROCHLORIDE 50 MG/1
100 TABLET, FILM COATED ORAL
Qty: 60 TABLET | Refills: 0 | Status: SHIPPED | OUTPATIENT
Start: 2019-05-08 | End: 2019-06-10

## 2019-05-08 ASSESSMENT — PAIN SCALES - GENERAL: PAINLEVEL: NO PAIN (0)

## 2019-05-08 ASSESSMENT — MIFFLIN-ST. JEOR: SCORE: 1396.04

## 2019-05-08 NOTE — PROGRESS NOTES
"MEDICAL ONCOLOGY CLINIC NOTE    REFERRING PROVIDER: Warren Mansfield MD from [facility] clinic.     REASON FOR CURRENT VISIT: Follow-up while on mitotane as adjuvant therapy of adrenocortical carcinoma.      HISTORY OF PRESENT ILLNESS: Ms. 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 Her oncologic history is detailed as under.     Suzy was doing very well on mitotane 1500mg AM - 1500mg afternoon and 1000mg HS. She increased the dose to 1500mg TID about 2 weeks ago and has noted substaintal increase in symptom burden including nausea with emesis 4x in 2 weeks despite using Compazine 10mg with all doses of mitotane. Also has diarrhea with 3-4 lose, non-bloody BM a day. Fatigue has also worsened slightly.     Also on hydrocortisone 30mg QAM, 20 early PM. No recurrence of episode of dizziness/LOC. No history of CVA/TIA/palpitations/CP/SOA etc.     Last visit with Dr. Hilton at [facility] was on 2/26/19. She also follows with Dr. Veena Jaquez in our endocrinology department. Has also met with Ector Marcelino, our psychologist, and is feeling better overall, but does describe symptoms of intermittent anxiety.     No clinical evidence of disease recurrence. Patient wants to get a \"tummy tuck\" done in June 2019 and is already seeing a plastic surgeon for this issue.    ONCOLOGIC HISTORY:   1. 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   - CT abdomen and pelvis stone protocol without contrast 5 8010 showed \"9.2 x 8 cm heterogeneous right upper quadrant mass with small foci of calcification within it which is likely arising from the adrenal gland though inseparable from liver and upper pole of right kidney. It is " "incompletely evaluated on this noncontrast study. 3 x 2.6 cm mass right lobe of liver on image #85 also incompletely evaluated. 6 x 4 x 4 mm upper third left ureteral obstructing stone with mild to moderate secondary hydronephrosis. Collection of stones at lower pole left kidney extending over an area of approximately 6 x 7 x 4 mm. Additional nonobstructing 1 mm stone upper pole left kidney. 2 mm nonobstructing stone noted upper pole right kidney with no hydronephrosis on the right. Postop change consistent with gastric bypass. Noncontrast images of spleen, left adrenal gland, gallbladder, and pancreas unremarkable. No aneurysm. No adenopathy.\"  - Seen by Dr. Delvalle at [facility] clinic. Referred to Dr. Alvino Patel and then Dr. Warren Mansfield.  - Endocrinology team directed workup showed high DHEAS level of 740 pre-surgery.   - Right open adrenalectomy and hepatic mobilization performed by Dr. Warren Mansfield on 6/25/2018. Pathology showed adrenocortical carcinoma measuring 11.3 cm, weighing 413 g with extension into or through the adrenal capsule negative lymphovascular invasion, tumor necrosis present, margins involved by tumor, no regional lymph nodes identified, pathologic stage T2 NX.  - DHEAS normalized postsurgery.   - Adjuvant Mitotane started on 7/24/18. Dose increased to 2000mg TID around 10/23/18 due to persistently low troughs. Held 1/16/19 for two weeks and resumed 1/30 at 1000 mg po BID due to very poor tolerance of higher doses. Highest mitotane level was 10.2 on 2/11/19. Now taking 1000mg TID.  - Saw radiation oncology at [facility], radiation oncology at [facility], as well as endocrine oncology (Dr. Huertas) at [facility].   - S/p adjuvant RT by Dr. Monsivais - 5040 cGy over 30 fr (8/24/18-10/6/18).  - 2/11/19: CT C/A/P and MRI brain with contrast - no evidence of disease recurrence.  - 05/07/19: CT C/A/P with contrast - \"1. No evidence for locally " "recurrent disease. 2. No evidence for metastatic disease in the chest, abdomen, or pelvis. 3. A 2.5 x 2.2 cm peripherally enhancing mass in hepatic segment 5, unchanged enhancing likely hemangioma given the peripheral discontinuous enhancement on previous examination. No other suspicious liver lesions.\"    REVIEW OF SYSTEMS: 14 point ROS negative other than the symptoms noted above in the HPI.    PAST MEDICAL AND SURGICAL HISTORY: Reviewed today  1. Right-sided adrenocortical carcinoma.  2. MHTFR mutation with h.o mutiple miscarriages.  3. Ovarian cysts diagnosed in 2011.  4. H/o gastric bypass in 2016 for obesity.    SOCIAL HISTORY:   Social History     Tobacco Use     Smoking status: Never Smoker     Smokeless tobacco: Never Used   Substance Use Topics     Alcohol use: Yes     Comment: occ.     Drug use: No     FAMILY HISTORY:   Reviewed and non-contributory to current admission.      ALLERGIES:   Allergies   Allergen Reactions     Bee Venom Swelling     Ibuprofen Hives and Swelling     CURRENT MEDICATIONS:   Current Outpatient Medications:      acetaminophen (TYLENOL) 325 MG tablet, Take 2 tablets (650 mg) by mouth every 4 hours as needed for other (multimodal surgical pain management along with NSAIDS and opioid medication as indicated based on pain control and physical function.), Disp: 150 tablet, Rfl: 0     buPROPion (WELLBUTRIN) 100 MG tablet, TAKE 1 TABLET (100 MG TOTAL) BY MOUTH 2 (TWO) TIMES A DAY., Disp: , Rfl: 3     calcium carbonate antacid 1000 MG CHEW, Take 1 tablet by mouth every morning , Disp: , Rfl:      cholecalciferol (VITAMIN D-1000 MAX ST) 1000 units TABS, Take 2,000 Units by mouth every morning , Disp: , Rfl:      fludrocortisone (FLORINEF) 0.1 MG tablet, Take 0.5 tablets (0.05 mg) by mouth daily, Disp: 45 tablet, Rfl: 3     FLUoxetine (PROZAC) 40 MG capsule, Take 40 mg by mouth daily, Disp: , Rfl:      gabapentin (NEURONTIN) 300 MG capsule, Take 300 mg by mouth 3 times daily, Disp: , Rfl: " "     hydrocortisone (CORTEF) 10 MG tablet, Take 3 pills (=30 mg) in AM and 2 pills (=20 mg) at 2 pm daily, additional as directed., Disp: 180 tablet, Rfl: 11     levothyroxine (SYNTHROID/LEVOTHROID) 125 MCG tablet, Take 1 tablet (125 mcg) by mouth daily, Disp: 90 tablet, Rfl: 3     melatonin 3 MG CAPS, Take 1 capsule by mouth At Bedtime, Disp: 30 capsule, Rfl: 11     mitotane (LYSODREN) 500 MG tablet CHEMO, Take 3 tablets (1,500 mg) by mouth 3 times daily, Disp: 270 tablet, Rfl: 0     Multiple Vitamins-Calcium (ONE-A-DAY WOMENS PO), Take 2 tablets by mouth every morning , Disp: , Rfl:      pantoprazole (PROTONIX) 40 MG EC tablet, Take 1 tablet (40 mg) by mouth daily, Disp: 30 tablet, Rfl: 0     prochlorperazine (COMPAZINE) 10 MG tablet, Take 1 tablet (10 mg) by mouth every 6 hours as needed for nausea or vomiting, Disp: 90 tablet, Rfl: 1     traZODone (DESYREL) 50 MG tablet, Take 2 tablets (100 mg) by mouth nightly as needed for sleep, Disp: 60 tablet, Rfl: 0     diazepam (VALIUM) 5 MG tablet, Take 1 tablet (5 mg) by mouth once as needed for anxiety (MRI claustrophobia management) (Patient not taking: Reported on 5/8/2019), Disp: 2 tablet, Rfl: 3     DRONABINOL PO, by Other route every other day Vape, Disp: , Rfl:      EPINEPHrine (EPIPEN/ADRENACLICK/OR ANY BX GENERIC EQUIV) 0.3 MG/0.3ML injection 2-pack, As directed for bee stings, Disp: , Rfl:      ferrous sulfate (IRON) 325 (65 Fe) MG tablet, Take 325 mg by mouth daily, Disp: , Rfl:      triamcinolone (KENALOG) 0.1 % lotion, Apply topically 3 times daily To rash on chest and shoudler (Patient not taking: Reported on 5/8/2019), Disp: 60 mL, Rfl: 1    PHYSICAL EXAMINATION:  Vital signs: /76 (BP Location: Left arm, Patient Position: Chair, Cuff Size: Adult Regular)   Pulse 91   Temp 98.6  F (37  C) (Oral)   Ht 1.626 m (5' 4.02\")   Wt 71.1 kg (156 lb 11.2 oz)   SpO2 100%   BMI 26.88 kg/m    ECOG performance status of 1.   GENERAL: Young lady in chair, in " NAD  HEENT: No icterus, no pallor. MMM, PERRL, OP clear.   NECK: Supple, no JVD/LAD.  LUNGS: CTAB, normal WOB on RA   CARDIOVASCULAR: Regular rate and rhythm, no murmurs, gallops or rubs, normal S1/S2.   ABDOMEN: Well-healing abdominal incision without fluctuance or exudate. Soft, NT, ND, no masses appreciated, normal BS.   EXTREMITIES: No cyanosis, no clubbing, no edema.   NEUROLOGIC: No focal deficits, CN 2-12 intact. Linear thought process.    LYMPH NODE EXAM: No palpable adenopathy - cervical, supraclavicular.      LABORATORY DATA:   Lab Test 05/08/19  1013 04/10/19  1030 03/12/19  1341   WBC 7.2 4.4 6.0   RBC 4.73 4.96 4.79   HGB 11.7 11.6* 10.8*   HCT 37.6 39.8 36.8   MCV 80 80 77*   MCH 24.7* 23.4* 22.5*   MCHC 31.1* 29.1* 29.3*   RDW 15.9* 17.5* 17.0*    281 277   NEUTROPHIL 87.8 74.0 85.4    137 136   POTASSIUM 3.4 3.0* 4.4   CHLORIDE 105 103 103   CO2 24 27 27   ANIONGAP 8 7 6   GLC 85 75 86   BUN 8 6* 10   CR 0.64 0.57 0.64   SHASHA 8.0* 8.1* 8.4*   PROTTOTAL 5.7* 6.3* 6.0*   ALBUMIN 3.0* 3.1* 3.0*   BILITOTAL 0.4 0.3 0.1*   ALKPHOS 128 189* 211*   AST 24 40 32   ALT 24 30 36     Lab Test 05/08/19  1013 04/10/19  1030 02/12/19  0949 12/03/18   TSH 0.27* 0.71 0.76 2.3   T4 0.98 1.06 0.82 0.7*     Lab Test 05/08/19  1013 04/10/19  1030   CHOL 189 201*    106   LDL 52 60   TRIG 121 174*   DHEAS was 740 on 6/5/18, and has been <15 on 7/12/18, 8/20/18, 9/19/18, 10/23/18, 11/27/18, 1/9/19. Pending from today.  Mitotane level (target 14-20): 1.8ng/dl on 8/20/18, 3.5 on 10/25/18, 6.2 on 12/4/18, 8.1 on 1/9/19 and 10.2 on 2/11/19.    IMAGING STUDIES:  As above.    ASSESSMENT AND PLAN: A 33-year-old lady with right-sided functioning adrenocortical carcinoma, who is here for follow-up.     Adrenocortical ca:  - Started on adjuvant mitotane in July 2018 based on her presentation and tumor characteristics including invasion of the adrenal capsule, high mitotic rate, possibly positive margins, and high  "Ki67, which could result in a rate of recurrence as high as 40%.    - Reviewed the results of restaging CT C/A/P from 05/07/19 that continues to show no evidence of disease recurrence. No evidence of clinical or biochemical recurrence either.   - Despite multiple supportive care interventions, patient has NOT been able to tolerate higher doses (above 4gm/day) of mitotane. The trial of dose increase to 1500mg TID has failed as well.   - We've had numerous conversations regarding goals of therapy and that mitotane doesn't work on an \"all or none\" basis with no activity below therapeutic level. In her case, it is critical to not aim for a number but to actually aim for optimizing tolerance to therapy to allow continuation for another 4.5 years. Suzy and her  are in agreement.   - Will decrease dose to 1500mg QAM, 1500mg in early afternoon and 1000mg in early evening for a total daily dose of 4gm starting today.   - Monthly Mitotane level, CBC, comprehensive panel, TSH, FT4, cholesterol panel, 24-hour Urine Free Cortisol, and DHEAS.   - Plan to continue adjuvant Mitotane for up to 5 yrs if tolerated.  - Next restaging CT C/A/P in 3 months.  - Have asked for a delay in the elective cosmetic surgery until she's completed at least a year of adjuvant mitotane with minimal scheduled interruptions.    Multifactorial nausea:  - While there are plenty of medical interventions that one could undertake to control nausea including olanzapine, patient has recently fallen and had this unexplained episode of dizziness and confusion, possibly due to polypharmacy.   - Advised to use Compazine sparingly.     Concern for ativan use disorder:  - Suzy has been dealing with tremendous stress over the past few months and was using Ativan HS almost daily for anxiety and nausea.   - I counseled her of the addictive potential of this drug and she did agree that she has felt increasingly desiring this medication.   - We have stopped " Ativan and I would encourage other medical providers to not give any further refills.  - Discussed health coping strategies including cognitive behavioral interventions. Seen by our psychology team as well.     Insomnia:  - Trazodone is working well but she's taking 100mg at bedtime. Will give refills PRN.   - EKG with QTc 488 ms in April - recheck in 2019.    Endocrinopathies:  - Mgmt per endocrine team at the .     Iron deficiency anemia:  - MCV and iron panel suggests iron deficiency, she has heavy periods and no other obvious sources of blood loss.  - She's got IUD placed in 2019. Monitor.    Return to see me in 1 month with labs. Patient and family given opportunity to ask questions, which were answered to their satisfaction. They're in complete agreement as planned.    BILLIN.    Benson Cintron M.D.  . Professor of Medicine  Genitourinary Oncology  Division of Hematology, Oncology & Transplantation  [facility]    CC: Edwin Huertas MD   [facility]

## 2019-05-08 NOTE — NURSING NOTE
"Oncology Rooming Note    May 8, 2019 10:25 AM   Suzy Rodríguez is a 33 year old female who presents for:    Chief Complaint   Patient presents with     Blood Draw     labs & JIC tube drawn via venipuncture by RN     Oncology Clinic Visit     Return visit related to Adrenal Cortical Cancer     Initial Vitals: /76 (BP Location: Left arm, Patient Position: Chair, Cuff Size: Adult Regular)   Pulse 91   Temp 98.6  F (37  C) (Oral)   Ht 1.626 m (5' 4.02\")   Wt 71.1 kg (156 lb 11.2 oz)   SpO2 100%   BMI 26.88 kg/m   Estimated body mass index is 26.88 kg/m  as calculated from the following:    Height as of this encounter: 1.626 m (5' 4.02\").    Weight as of this encounter: 71.1 kg (156 lb 11.2 oz). Body surface area is 1.79 meters squared.  No Pain (0) Comment: Data Unavailable   No LMP recorded.  Allergies reviewed: Yes  Medications reviewed: Yes    Medications: MEDICATION REFILLS NEEDED TODAY. Provider was notified.  Pharmacy name entered into [facility]:    [facility] #[id number] - [facility] [address] [place]ARMACY [place], [place] - [address] [facility] [address] [place] [address] PHARMACY [place], [place] - [address] [facility] - [place], [place] - [address] [address]  [facility] [address] [place] [facility] - [place], [place] - [address]    Clinical concerns: Refill needed today. Provider was notified.      Adia Pimentel LPN            "

## 2019-05-08 NOTE — LETTER
"5/8/2019       RE: Suzy Rodríguez  [address]  [place] [address]     Dear Colleague,    Thank you for referring your patient, Suzy Rodríguez, to the [facility]. Please see a copy of my visit note below.    MEDICAL ONCOLOGY CLINIC NOTE    REFERRING PROVIDER: Warren Mansfield MD from [facility] clinic.     REASON FOR CURRENT VISIT: Follow-up while on mitotane as adjuvant therapy of adrenocortical carcinoma.      HISTORY OF PRESENT ILLNESS: Ms. 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 Her oncologic history is detailed as under.     Suzy was doing very well on mitotane 1500mg AM - 1500mg afternoon and 1000mg HS. She increased the dose to 1500mg TID about 2 weeks ago and has noted substaintal increase in symptom burden including nausea with emesis 4x in 2 weeks despite using Compazine 10mg with all doses of mitotane. Also has diarrhea with 3-4 lose, non-bloody BM a day. Fatigue has also worsened slightly.     Also on hydrocortisone 30mg QAM, 20 early PM. No recurrence of episode of dizziness/LOC. No history of CVA/TIA/palpitations/CP/SOA etc.     Last visit with Dr. Hilton at [facility] was on 2/26/19. She also follows with Dr. Veena Jaquez in our endocrinology department. Has also met with Ector Marcelino, our psychologist, and is feeling better overall, but does describe symptoms of intermittent anxiety.     No clinical evidence of disease recurrence. Patient wants to get a \"tummy tuck\" done in June 2019 and is already seeing a plastic surgeon for this issue.    ONCOLOGIC HISTORY:   1. 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   - CT abdomen and pelvis stone protocol without " "contrast 5 8017 showed \"9.2 x 8 cm heterogeneous right upper quadrant mass with small foci of calcification within it which is likely arising from the adrenal gland though inseparable from liver and upper pole of right kidney. It is incompletely evaluated on this noncontrast study. 3 x 2.6 cm mass right lobe of liver on image #85 also incompletely evaluated. 6 x 4 x 4 mm upper third left ureteral obstructing stone with mild to moderate secondary hydronephrosis. Collection of stones at lower pole left kidney extending over an area of approximately 6 x 7 x 4 mm. Additional nonobstructing 1 mm stone upper pole left kidney. 2 mm nonobstructing stone noted upper pole right kidney with no hydronephrosis on the right. Postop change consistent with gastric bypass. Noncontrast images of spleen, left adrenal gland, gallbladder, and pancreas unremarkable. No aneurysm. No adenopathy.\"  - Seen by Dr. Delvalle at [facility]. Referred to Dr. Alvino Patel and then Dr. Warren Mansfield.  - Endocrinology team directed workup showed high DHEAS level of 740 pre-surgery.   - Right open adrenalectomy and hepatic mobilization performed by Dr. Warren Mansfield on 6/25/2018. Pathology showed adrenocortical carcinoma measuring 11.3 cm, weighing 413 g with extension into or through the adrenal capsule negative lymphovascular invasion, tumor necrosis present, margins involved by tumor, no regional lymph nodes identified, pathologic stage T2 NX.  - DHEAS normalized postsurgery.   - Adjuvant Mitotane started on 7/24/18. Dose increased to 2000mg TID around 10/23/18 due to persistently low troughs. Held 1/16/19 for two weeks and resumed 1/30 at 1000 mg po BID due to very poor tolerance of higher doses. Highest mitotane level was 10.2 on 2/11/19. Now taking 1000mg TID.  - Saw radiation oncology at [facility], radiation oncology at [facility], as well as endocrine oncology (Dr. Huertas) at [facility]" [place].   - S/p adjuvant RT by Dr. Monsivais - 5040 cGy over 30 fr (8/24/18-10/6/18).  - 2/11/19: CT C/A/P and MRI brain with contrast - no evidence of disease recurrence.  - 05/07/19: CT C/A/P with contrast - \"1. No evidence for locally recurrent disease. 2. No evidence for metastatic disease in the chest, abdomen, or pelvis. 3. A 2.5 x 2.2 cm peripherally enhancing mass in hepatic segment 5, unchanged enhancing likely hemangioma given the peripheral discontinuous enhancement on previous examination. No other suspicious liver lesions.\"    REVIEW OF SYSTEMS: 14 point ROS negative other than the symptoms noted above in the HPI.    PAST MEDICAL AND SURGICAL HISTORY: Reviewed today  1. Right-sided adrenocortical carcinoma.  2. MHTFR mutation with h.o mutiple miscarriages.  3. Ovarian cysts diagnosed in 2011.  4. H/o gastric bypass in 2016 for obesity.    SOCIAL HISTORY:   Social History     Tobacco Use     Smoking status: Never Smoker     Smokeless tobacco: Never Used   Substance Use Topics     Alcohol use: Yes     Comment: occ.     Drug use: No     FAMILY HISTORY:   Reviewed and non-contributory to current admission.      ALLERGIES:   Allergies   Allergen Reactions     Bee Venom Swelling     Ibuprofen Hives and Swelling     CURRENT MEDICATIONS:   Current Outpatient Medications:      acetaminophen (TYLENOL) 325 MG tablet, Take 2 tablets (650 mg) by mouth every 4 hours as needed for other (multimodal surgical pain management along with NSAIDS and opioid medication as indicated based on pain control and physical function.), Disp: 150 tablet, Rfl: 0     buPROPion (WELLBUTRIN) 100 MG tablet, TAKE 1 TABLET (100 MG TOTAL) BY MOUTH 2 (TWO) TIMES A DAY., Disp: , Rfl: 3     calcium carbonate antacid 1000 MG CHEW, Take 1 tablet by mouth every morning , Disp: , Rfl:      cholecalciferol (VITAMIN D-1000 MAX ST) 1000 units TABS, Take 2,000 Units by mouth every morning , Disp: , Rfl:      fludrocortisone (FLORINEF) 0.1 MG tablet, Take " 0.5 tablets (0.05 mg) by mouth daily, Disp: 45 tablet, Rfl: 3     FLUoxetine (PROZAC) 40 MG capsule, Take 40 mg by mouth daily, Disp: , Rfl:      gabapentin (NEURONTIN) 300 MG capsule, Take 300 mg by mouth 3 times daily, Disp: , Rfl:      hydrocortisone (CORTEF) 10 MG tablet, Take 3 pills (=30 mg) in AM and 2 pills (=20 mg) at 2 pm daily, additional as directed., Disp: 180 tablet, Rfl: 11     levothyroxine (SYNTHROID/LEVOTHROID) 125 MCG tablet, Take 1 tablet (125 mcg) by mouth daily, Disp: 90 tablet, Rfl: 3     melatonin 3 MG CAPS, Take 1 capsule by mouth At Bedtime, Disp: 30 capsule, Rfl: 11     mitotane (LYSODREN) 500 MG tablet CHEMO, Take 3 tablets (1,500 mg) by mouth 3 times daily, Disp: 270 tablet, Rfl: 0     Multiple Vitamins-Calcium (ONE-A-DAY WOMENS PO), Take 2 tablets by mouth every morning , Disp: , Rfl:      pantoprazole (PROTONIX) 40 MG EC tablet, Take 1 tablet (40 mg) by mouth daily, Disp: 30 tablet, Rfl: 0     prochlorperazine (COMPAZINE) 10 MG tablet, Take 1 tablet (10 mg) by mouth every 6 hours as needed for nausea or vomiting, Disp: 90 tablet, Rfl: 1     traZODone (DESYREL) 50 MG tablet, Take 2 tablets (100 mg) by mouth nightly as needed for sleep, Disp: 60 tablet, Rfl: 0     diazepam (VALIUM) 5 MG tablet, Take 1 tablet (5 mg) by mouth once as needed for anxiety (MRI claustrophobia management) (Patient not taking: Reported on 5/8/2019), Disp: 2 tablet, Rfl: 3     DRONABINOL PO, by Other route every other day Vape, Disp: , Rfl:      EPINEPHrine (EPIPEN/ADRENACLICK/OR ANY BX GENERIC EQUIV) 0.3 MG/0.3ML injection 2-pack, As directed for bee stings, Disp: , Rfl:      ferrous sulfate (IRON) 325 (65 Fe) MG tablet, Take 325 mg by mouth daily, Disp: , Rfl:      triamcinolone (KENALOG) 0.1 % lotion, Apply topically 3 times daily To rash on chest and shoudler (Patient not taking: Reported on 5/8/2019), Disp: 60 mL, Rfl: 1    PHYSICAL EXAMINATION:  Vital signs: /76 (BP Location: Left arm, Patient  "Position: Chair, Cuff Size: Adult Regular)   Pulse 91   Temp 98.6  F (37  C) (Oral)   Ht 1.626 m (5' 4.02\")   Wt 71.1 kg (156 lb 11.2 oz)   SpO2 100%   BMI 26.88 kg/m     ECOG performance status of 1.   GENERAL: Young lady in chair, in NAD  HEENT: No icterus, no pallor. MMM, PERRL, OP clear.   NECK: Supple, no JVD/LAD.  LUNGS: CTAB, normal WOB on RA   CARDIOVASCULAR: Regular rate and rhythm, no murmurs, gallops or rubs, normal S1/S2.   ABDOMEN: Well-healing abdominal incision without fluctuance or exudate. Soft, NT, ND, no masses appreciated, normal BS.   EXTREMITIES: No cyanosis, no clubbing, no edema.   NEUROLOGIC: No focal deficits, CN 2-12 intact. Linear thought process.    LYMPH NODE EXAM: No palpable adenopathy - cervical, supraclavicular.      LABORATORY DATA:   Lab Test 05/08/19  1013 04/10/19  1030 03/12/19  1341   WBC 7.2 4.4 6.0   RBC 4.73 4.96 4.79   HGB 11.7 11.6* 10.8*   HCT 37.6 39.8 36.8   MCV 80 80 77*   MCH 24.7* 23.4* 22.5*   MCHC 31.1* 29.1* 29.3*   RDW 15.9* 17.5* 17.0*    281 277   NEUTROPHIL 87.8 74.0 85.4    137 136   POTASSIUM 3.4 3.0* 4.4   CHLORIDE 105 103 103   CO2 24 27 27   ANIONGAP 8 7 6   GLC 85 75 86   BUN 8 6* 10   CR 0.64 0.57 0.64   SHASHA 8.0* 8.1* 8.4*   PROTTOTAL 5.7* 6.3* 6.0*   ALBUMIN 3.0* 3.1* 3.0*   BILITOTAL 0.4 0.3 0.1*   ALKPHOS 128 189* 211*   AST 24 40 32   ALT 24 30 36     Lab Test 05/08/19  1013 04/10/19  1030 02/12/19  0949 12/03/18   TSH 0.27* 0.71 0.76 2.3   T4 0.98 1.06 0.82 0.7*     Lab Test 05/08/19  1013 04/10/19  1030   CHOL 189 201*    106   LDL 52 60   TRIG 121 174*   DHEAS was 740 on 6/5/18, and has been <15 on 7/12/18, 8/20/18, 9/19/18, 10/23/18, 11/27/18, 1/9/19. Pending from today.  Mitotane level (target 14-20): 1.8ng/dl on 8/20/18, 3.5 on 10/25/18, 6.2 on 12/4/18, 8.1 on 1/9/19 and 10.2 on 2/11/19.    IMAGING STUDIES:  As above.    ASSESSMENT AND PLAN: A 33-year-old lady with right-sided functioning adrenocortical carcinoma, " "who is here for follow-up.     Adrenocortical ca:  - Started on adjuvant mitotane in July 2018 based on her presentation and tumor characteristics including invasion of the adrenal capsule, high mitotic rate, possibly positive margins, and high Ki67, which could result in a rate of recurrence as high as 40%.    - Reviewed the results of restaging CT C/A/P from 05/07/19 that continues to show no evidence of disease recurrence. No evidence of clinical or biochemical recurrence either.   - Despite multiple supportive care interventions, patient has NOT been able to tolerate higher doses (above 4gm/day) of mitotane. The trial of dose increase to 1500mg TID has failed as well.   - We've had numerous conversations regarding goals of therapy and that mitotane doesn't work on an \"all or none\" basis with no activity below therapeutic level. In her case, it is critical to not aim for a number but to actually aim for optimizing tolerance to therapy to allow continuation for another 4.5 years. Suzy and her  are in agreement.   - Will decrease dose to 1500mg QAM, 1500mg in early afternoon and 1000mg in early evening for a total daily dose of 4gm starting today.   - Monthly Mitotane level, CBC, comprehensive panel, TSH, FT4, cholesterol panel, 24-hour Urine Free Cortisol, and DHEAS.   - Plan to continue adjuvant Mitotane for up to 5 yrs if tolerated.  - Next restaging CT C/A/P in 3 months.  - Have asked for a delay in the elective cosmetic surgery until she's completed at least a year of adjuvant mitotane with minimal scheduled interruptions.    Multifactorial nausea:  - While there are plenty of medical interventions that one could undertake to control nausea including olanzapine, patient has recently fallen and had this unexplained episode of dizziness and confusion, possibly due to polypharmacy.   - Advised to use Compazine sparingly.     Concern for ativan use disorder:  - Suzy has been dealing with tremendous " stress over the past few months and was using Ativan HS almost daily for anxiety and nausea.   - I counseled her of the addictive potential of this drug and she did agree that she has felt increasingly desiring this medication.   - We have stopped Ativan and I would encourage other medical providers to not give any further refills.  - Discussed health coping strategies including cognitive behavioral interventions. Seen by our psychology team as well.     Insomnia:  - Trazodone is working well but she's taking 100mg at bedtime. Will give refills PRN.   - EKG with QTc 488 ms in April - recheck in 2019.    Endocrinopathies:  - Mgmt per endocrine team at the .     Iron deficiency anemia:  - MCV and iron panel suggests iron deficiency, she has heavy periods and no other obvious sources of blood loss.  - She's got IUD placed in 2019. Monitor.    Return to see me in 1 month with labs. Patient and family given opportunity to ask questions, which were answered to their satisfaction. They're in complete agreement as planned.    BILLIN.    Benson Cintron M.D.  . Professor of Medicine  Genitourinary Oncology  Division of Hematology, Oncology & Transplantation  [facility]    CC: Edwin Huertas MD   [facility], [facility]

## 2019-05-08 NOTE — NURSING NOTE
Chief Complaint   Patient presents with     Blood Draw     labs & JIC tube drawn via venipuncture by RN     /76 (BP Location: Left arm, Patient Position: Chair, Cuff Size: Adult Regular)   Pulse 91   Temp 98.6  F (37  C) (Oral)   Wt 71.1 kg (156 lb 11.2 oz)   SpO2 100%   BMI 26.90 kg/m      Labs collected and sent from right antecubital venipuncture in lab w/ ultrasound by RN. Pt tolerated well. Pt checked in for next appointment.    Alejandra Montano RN

## 2019-05-09 LAB — DHEA-S SERPL-MCNC: <15 UG/DL (ref 35–430)

## 2019-05-10 LAB
ALDOST SERPL-MCNC: 5.6 NG/DL (ref 0–31)
RENIN PLAS-CCNC: 2.2 NG/ML/HR

## 2019-05-12 ENCOUNTER — MYC MEDICAL ADVICE (OUTPATIENT)
Dept: ENDOCRINOLOGY | Facility: CLINIC | Age: 34
End: 2019-05-12

## 2019-05-13 DIAGNOSIS — E27.49 SECONDARY ADRENAL INSUFFICIENCY (H): ICD-10-CM

## 2019-05-15 ENCOUNTER — TELEPHONE (OUTPATIENT)
Dept: ONCOLOGY | Facility: CLINIC | Age: 34
End: 2019-05-15

## 2019-05-15 NOTE — ORAL ONC MGMT
Oral Chemotherapy Monitoring Program    Primary Oncologist: Dr. Cintron  Primary Oncology Clinic: [facility]  Cancer Diagnosis: Adrenocortical carcinoma     Therapy History:  Started 7/24/18 - mitotane 500mg BID  8/7: 750mg BID or 1000/500mg  8/21: 1000mg BID   9/28: 1500mg PO BID  10/31: 1500mg PO three times daily  1500mg PO QID  Held 1/16/19 for one week  Resumed 1/30 at 1000 mg po BID  2/12: 1000mg po tid  3/13:1000mg po TID   4/10: 1500mg AM, 1000mg midday, 1000mg HS  4/17: 1500mg AM, 1500mg midday, 1000mg HS  4/25: 1500mg TID  5/8: 1500mg AM, 1500mg midday, 1000mg HS     Drug Interaction Assessment: No new OTC medications or prescription medications/no new known drug interactions.     Lab Monitoring Plan  Monthly Mitotane level, CBC, CMP, TSH, FT4, cholesterol panel, 24-hour Urine Free Cortisol, and DHEAS.     Subjective/Objective:  Suzy Rodríguez is a 33 year old female contacted by phone for a follow-up visit for oral chemotherapy.  Patient reports improvement in nausea, diarrhea and fatigue. Pt reports nausea is now controlled with compazine. Fatigue has improved to baseline. Pt reports improvement in diarrhea to 2 loose, non-bloody BM/day that is controlled with loperamide.    ORAL CHEMOTHERAPY 10/31/2018 11/13/2018 2/5/2019 2/12/2019 4/10/2019 4/17/2019 4/25/2019   Drug Name Lysodren (Mitotane) Lysodren (Mitotane) Lysodren (Mitotane) Lysodren (Mitotane) Lysodren (Mitotane) Lysodren (Mitotane) Lysodren (Mitotane)   Current Dosage 1500mg 1500mg 1000mg 1000mg (No Data) (No Data) 1500mg   Current Schedule TID QID BID TID TID TID TID   Cycle Details Continuous Continuous Continuous Continuous Continuous Continuous Continuous   Start Date of Last Cycle 10/31/2018 - 1/30/2019 1/30/2019 - - -   Planned next cycle start date - - - - - - -   Doses missed in last 2 weeks - - - - - - 0   Adherence Assessment - - Adherent - - - Adherent   Adverse Effects - - Nausea - - - No AE identified during assessment  "  Nausea - - Grade 1 - - - -   Pharmacist Intervention(nausea) - - Yes - - - -   Intervention(s) - - Patient education - - - -   Any new drug interactions? - - - - - - No   Is the dose as ordered appropriate for the patient? - - - - - - Yes       Vitals:  BP:   BP Readings from Last 1 Encounters:   05/08/19 108/76     Wt Readings from Last 1 Encounters:   05/08/19 71.1 kg (156 lb 11.2 oz)     Estimated body surface area is 1.79 meters squared as calculated from the following:    Height as of 5/8/19: 1.626 m (5' 4.02\").    Weight as of 5/8/19: 71.1 kg (156 lb 11.2 oz).      Assessment/Plan:  Suzy is tolerating mitotane therapy very well at her previously tolerated dose (1500mg qAM, 1500mg midday, and 1000mg qPM). She denies any new side effects and her nausea is completely controlled with use of prophylactic compazine.    Follow-Up:  06/12/2019 appt with Dr. Cintron  06/26/2019 phone follow up with pharmacist     Mala Pettit, Pharm.D., [facility]  [phone]  05/15/19      "

## 2019-05-21 DIAGNOSIS — C74.01 MALIGNANT NEOPLASM OF CORTEX OF RIGHT ADRENAL GLAND (H): Primary | ICD-10-CM

## 2019-06-10 ENCOUNTER — COMMUNICATION - HEALTHEAST (OUTPATIENT)
Dept: FAMILY MEDICINE | Facility: CLINIC | Age: 34
End: 2019-06-10

## 2019-06-10 ENCOUNTER — DOCUMENTATION ONLY (OUTPATIENT)
Dept: ONCOLOGY | Facility: CLINIC | Age: 34
End: 2019-06-10

## 2019-06-10 DIAGNOSIS — F33.9 MAJOR DEPRESSIVE DISORDER, RECURRENT EPISODE (H): ICD-10-CM

## 2019-06-10 DIAGNOSIS — F19.982 DRUG-INDUCED INSOMNIA (H): ICD-10-CM

## 2019-06-10 DIAGNOSIS — F41.1 GENERALIZED ANXIETY DISORDER: ICD-10-CM

## 2019-06-10 DIAGNOSIS — C74.01 MALIGNANT NEOPLASM OF CORTEX OF RIGHT ADRENAL GLAND (H): ICD-10-CM

## 2019-06-10 RX ORDER — TRAZODONE HYDROCHLORIDE 50 MG/1
100 TABLET, FILM COATED ORAL
Qty: 60 TABLET | Refills: 0 | Status: SHIPPED | OUTPATIENT
Start: 2019-06-10 | End: 2019-07-24

## 2019-06-10 NOTE — PROGRESS NOTES
Refilled Pt's traZODone, Rx accidentally printed. Called Rx into Pt preferred pharmacy. Shredded printed copy.

## 2019-06-13 ENCOUNTER — COMMUNICATION - HEALTHEAST (OUTPATIENT)
Dept: FAMILY MEDICINE | Facility: CLINIC | Age: 34
End: 2019-06-13

## 2019-06-13 DIAGNOSIS — F33.9 MAJOR DEPRESSIVE DISORDER, RECURRENT EPISODE (H): ICD-10-CM

## 2019-06-13 DIAGNOSIS — F41.1 GENERALIZED ANXIETY DISORDER: ICD-10-CM

## 2019-06-15 RX ORDER — METHYLPREDNISOLONE SODIUM SUCCINATE 125 MG/2ML
125 INJECTION, POWDER, LYOPHILIZED, FOR SOLUTION INTRAMUSCULAR; INTRAVENOUS
Status: CANCELLED
Start: 2019-06-17

## 2019-06-15 RX ORDER — EPINEPHRINE 0.3 MG/.3ML
0.3 INJECTION SUBCUTANEOUS EVERY 5 MIN PRN
Status: CANCELLED | OUTPATIENT
Start: 2019-06-17

## 2019-06-15 RX ORDER — EPINEPHRINE 1 MG/ML
0.3 INJECTION, SOLUTION INTRAMUSCULAR; SUBCUTANEOUS EVERY 5 MIN PRN
Status: CANCELLED | OUTPATIENT
Start: 2019-06-17

## 2019-06-15 RX ORDER — ALBUTEROL SULFATE 90 UG/1
1-2 AEROSOL, METERED RESPIRATORY (INHALATION)
Status: CANCELLED
Start: 2019-06-17

## 2019-06-15 RX ORDER — ALBUTEROL SULFATE 0.83 MG/ML
2.5 SOLUTION RESPIRATORY (INHALATION)
Status: CANCELLED | OUTPATIENT
Start: 2019-06-17

## 2019-06-15 RX ORDER — DIPHENHYDRAMINE HYDROCHLORIDE 50 MG/ML
50 INJECTION INTRAMUSCULAR; INTRAVENOUS
Status: CANCELLED
Start: 2019-06-17

## 2019-06-15 RX ORDER — MEPERIDINE HYDROCHLORIDE 25 MG/ML
25 INJECTION INTRAMUSCULAR; INTRAVENOUS; SUBCUTANEOUS EVERY 30 MIN PRN
Status: CANCELLED | OUTPATIENT
Start: 2019-06-17

## 2019-06-15 RX ORDER — SODIUM CHLORIDE 9 MG/ML
1000 INJECTION, SOLUTION INTRAVENOUS CONTINUOUS PRN
Status: CANCELLED
Start: 2019-06-17

## 2019-06-17 DIAGNOSIS — C74.01 MALIGNANT NEOPLASM OF CORTEX OF RIGHT ADRENAL GLAND (H): Primary | ICD-10-CM

## 2019-06-18 ENCOUNTER — ONCOLOGY VISIT (OUTPATIENT)
Dept: ONCOLOGY | Facility: CLINIC | Age: 34
End: 2019-06-18
Attending: INTERNAL MEDICINE
Payer: COMMERCIAL

## 2019-06-18 ENCOUNTER — APPOINTMENT (OUTPATIENT)
Dept: LAB | Facility: CLINIC | Age: 34
End: 2019-06-18
Attending: INTERNAL MEDICINE
Payer: COMMERCIAL

## 2019-06-18 ENCOUNTER — RECORDS - HEALTHEAST (OUTPATIENT)
Dept: ADMINISTRATIVE | Facility: OTHER | Age: 34
End: 2019-06-18

## 2019-06-18 VITALS
HEART RATE: 93 BPM | RESPIRATION RATE: 18 BRPM | BODY MASS INDEX: 25.93 KG/M2 | TEMPERATURE: 97.8 F | OXYGEN SATURATION: 100 % | HEIGHT: 64 IN | DIASTOLIC BLOOD PRESSURE: 72 MMHG | WEIGHT: 151.9 LBS | SYSTOLIC BLOOD PRESSURE: 106 MMHG

## 2019-06-18 DIAGNOSIS — C74.01 ADRENAL CORTEX CANCER, RIGHT (H): ICD-10-CM

## 2019-06-18 DIAGNOSIS — R11.0 NAUSEA: ICD-10-CM

## 2019-06-18 DIAGNOSIS — C74.01 MALIGNANT NEOPLASM OF CORTEX OF RIGHT ADRENAL GLAND (H): ICD-10-CM

## 2019-06-18 LAB
ALBUMIN SERPL-MCNC: 2.9 G/DL (ref 3.4–5)
ALP SERPL-CCNC: 107 U/L (ref 40–150)
ALT SERPL W P-5'-P-CCNC: 21 U/L (ref 0–50)
ANION GAP SERPL CALCULATED.3IONS-SCNC: 10 MMOL/L (ref 3–14)
AST SERPL W P-5'-P-CCNC: 20 U/L (ref 0–45)
BASOPHILS # BLD AUTO: 0 10E9/L (ref 0–0.2)
BASOPHILS NFR BLD AUTO: 0.5 %
BILIRUB SERPL-MCNC: 0.2 MG/DL (ref 0.2–1.3)
BUN SERPL-MCNC: 6 MG/DL (ref 7–30)
CALCIUM SERPL-MCNC: 7.6 MG/DL (ref 8.5–10.1)
CHLORIDE SERPL-SCNC: 105 MMOL/L (ref 94–109)
CO2 SERPL-SCNC: 21 MMOL/L (ref 20–32)
CREAT SERPL-MCNC: 0.55 MG/DL (ref 0.52–1.04)
DIFFERENTIAL METHOD BLD: ABNORMAL
EOSINOPHIL # BLD AUTO: 0 10E9/L (ref 0–0.7)
EOSINOPHIL NFR BLD AUTO: 1 %
ERYTHROCYTE [DISTWIDTH] IN BLOOD BY AUTOMATED COUNT: 15.2 % (ref 10–15)
GFR SERPL CREATININE-BSD FRML MDRD: >90 ML/MIN/{1.73_M2}
GLUCOSE SERPL-MCNC: 160 MG/DL (ref 70–99)
HCT VFR BLD AUTO: 36.1 % (ref 35–47)
HGB BLD-MCNC: 11 G/DL (ref 11.7–15.7)
IMM GRANULOCYTES # BLD: 0 10E9/L (ref 0–0.4)
IMM GRANULOCYTES NFR BLD: 0.2 %
LYMPHOCYTES # BLD AUTO: 0.8 10E9/L (ref 0.8–5.3)
LYMPHOCYTES NFR BLD AUTO: 19.1 %
MCH RBC QN AUTO: 25.2 PG (ref 26.5–33)
MCHC RBC AUTO-ENTMCNC: 30.5 G/DL (ref 31.5–36.5)
MCV RBC AUTO: 83 FL (ref 78–100)
MONOCYTES # BLD AUTO: 0.2 10E9/L (ref 0–1.3)
MONOCYTES NFR BLD AUTO: 5.4 %
NEUTROPHILS # BLD AUTO: 3 10E9/L (ref 1.6–8.3)
NEUTROPHILS NFR BLD AUTO: 73.8 %
NRBC # BLD AUTO: 0 10*3/UL
NRBC BLD AUTO-RTO: 0 /100
PLATELET # BLD AUTO: 250 10E9/L (ref 150–450)
POTASSIUM SERPL-SCNC: 3.2 MMOL/L (ref 3.4–5.3)
PROT SERPL-MCNC: 5.9 G/DL (ref 6.8–8.8)
RBC # BLD AUTO: 4.37 10E12/L (ref 3.8–5.2)
SODIUM SERPL-SCNC: 137 MMOL/L (ref 133–144)
T4 FREE SERPL-MCNC: 1 NG/DL (ref 0.76–1.46)
TSH SERPL DL<=0.005 MIU/L-ACNC: 0.09 MU/L (ref 0.4–4)
WBC # BLD AUTO: 4.1 10E9/L (ref 4–11)

## 2019-06-18 PROCEDURE — 84439 ASSAY OF FREE THYROXINE: CPT | Performed by: INTERNAL MEDICINE

## 2019-06-18 PROCEDURE — 85025 COMPLETE CBC W/AUTO DIFF WBC: CPT | Performed by: INTERNAL MEDICINE

## 2019-06-18 PROCEDURE — 84443 ASSAY THYROID STIM HORMONE: CPT | Performed by: INTERNAL MEDICINE

## 2019-06-18 PROCEDURE — 99215 OFFICE O/P EST HI 40 MIN: CPT | Mod: ZP | Performed by: INTERNAL MEDICINE

## 2019-06-18 PROCEDURE — 36415 COLL VENOUS BLD VENIPUNCTURE: CPT

## 2019-06-18 PROCEDURE — 82627 DEHYDROEPIANDROSTERONE: CPT | Performed by: INTERNAL MEDICINE

## 2019-06-18 PROCEDURE — 84999 UNLISTED CHEMISTRY PROCEDURE: CPT | Performed by: INTERNAL MEDICINE

## 2019-06-18 PROCEDURE — 80375 DRUG/SUBSTANCE NOS 1-3: CPT | Performed by: INTERNAL MEDICINE

## 2019-06-18 PROCEDURE — 80053 COMPREHEN METABOLIC PANEL: CPT | Performed by: INTERNAL MEDICINE

## 2019-06-18 PROCEDURE — G0463 HOSPITAL OUTPT CLINIC VISIT: HCPCS | Mod: ZF

## 2019-06-18 RX ORDER — PROCHLORPERAZINE MALEATE 5 MG
5 TABLET ORAL EVERY 8 HOURS PRN
Qty: 90 TABLET | Refills: 3 | Status: SHIPPED | OUTPATIENT
Start: 2019-06-18 | End: 2020-12-15

## 2019-06-18 ASSESSMENT — PAIN SCALES - GENERAL: PAINLEVEL: NO PAIN (0)

## 2019-06-18 ASSESSMENT — MIFFLIN-ST. JEOR: SCORE: 1374.26

## 2019-06-18 NOTE — NURSING NOTE
"Oncology Rooming Note    June 18, 2019 12:45 PM   Suzy Rodríguez is a 33 year old female who presents for:    Chief Complaint   Patient presents with     Blood Draw     Labs drawn via  by RN in lab. VS taken.     Oncology Clinic Visit     Return visit related to Adrenal Cortical Cancer     Initial Vitals: /72 (BP Location: Right arm, Patient Position: Sitting, Cuff Size: Adult Regular)   Pulse 93   Temp 97.8  F (36.6  C) (Oral)   Resp 18   Ht 1.626 m (5' 4.02\")   Wt 68.9 kg (151 lb 14.4 oz)   SpO2 100%   BMI 26.06 kg/m   Estimated body mass index is 26.06 kg/m  as calculated from the following:    Height as of this encounter: 1.626 m (5' 4.02\").    Weight as of this encounter: 68.9 kg (151 lb 14.4 oz). Body surface area is 1.76 meters squared.  No Pain (0) Comment: Data Unavailable   No LMP recorded.  Allergies reviewed: Yes  Medications reviewed: Yes    Medications: Medication refills not needed today.  Pharmacy name entered into [facility]:    [id number] - [facility] [address] [place], [place] - [address] [facility] [address] [place] [address] PHARMACY [place], [place] [address] [facility] - [place], [place] - [address] [address]  [facility] [address] [facility] [place] [place] - [address] [facility]    Clinical concerns:No new concerns. Provider was notified.      Adia Pimentel LPN            "

## 2019-06-18 NOTE — NURSING NOTE
Chief Complaint   Patient presents with     Blood Draw     Labs drawn via  by RN in lab. VS taken.     Camila Carr RN

## 2019-06-18 NOTE — PROGRESS NOTES
"MEDICAL ONCOLOGY CLINIC NOTE    REFERRING PROVIDER: Warren Mansfield MD from [facility] Urologic Oncology clinic.     REASON FOR CURRENT VISIT: Follow-up while on mitotane as adjuvant therapy of adrenocortical carcinoma.      HISTORY OF PRESENT ILLNESS: Ms. 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 Her oncologic history is detailed as under.     Suzy was doing very well on mitotane 1500mg AM - 1500mg afternoon and 1000mg HS. Using Compazine 10mg BID for nausea and has 2 lose BM a day (slightly worse since starting Amoxicillin for an emergency root canal Rx last week). Fatigue is now severe. Also on hydrocortisone 30mg QAM, 20 early PM. No recurrence of episode of dizziness/LOC. No history of CVA/TIA/palpitations/CP/SOA etc. Not exercising much.     Last visit with Dr. Hilton at [facility] was on 2/26/19. She also follows with Dr. Veena Jaquez in our endocrinology department. Has also met with Ector Marcelino, our psychologist, and is feeling better overall, but does describe symptoms of intermittent anxiety.     No clinical evidence of disease recurrence.     ONCOLOGIC HISTORY:   1. 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   - CT abdomen and pelvis stone protocol without contrast 5 2883 showed \"9.2 x 8 cm heterogeneous right upper quadrant mass with small foci of calcification within it which is likely arising from the adrenal gland though inseparable from liver and upper pole of right kidney. It is incompletely evaluated on this noncontrast study. 3 x 2.6 cm mass right lobe of liver on image #85 also incompletely evaluated. 6 x 4 x 4 mm upper third left ureteral obstructing stone with mild to moderate secondary " "hydronephrosis. Collection of stones at lower pole left kidney extending over an area of approximately 6 x 7 x 4 mm. Additional nonobstructing 1 mm stone upper pole left kidney. 2 mm nonobstructing stone noted upper pole right kidney with no hydronephrosis on the right. Postop change consistent with gastric bypass. Noncontrast images of spleen, left adrenal gland, gallbladder, and pancreas unremarkable. No aneurysm. No adenopathy.\"  - Seen by Dr. Delvalle at [facility] clinic. Referred to Dr. Alvino Patel and then Dr. Warren Mansfield.  - Endocrinology team directed workup showed high DHEAS level of 740 pre-surgery.   - Right open adrenalectomy and hepatic mobilization performed by Dr. Warren Mansfield on 6/25/2018. Pathology showed adrenocortical carcinoma measuring 11.3 cm, weighing 413 g with extension into or through the adrenal capsule negative lymphovascular invasion, tumor necrosis present, margins involved by tumor, no regional lymph nodes identified, pathologic stage T2 NX.  - DHEAS normalized postsurgery.   - Adjuvant Mitotane started on 7/24/18. Dose increased to 2000mg TID around 10/23/18 due to persistently low troughs. Held 1/16/19 for two weeks and resumed 1/30 at 1000 mg po BID due to very poor tolerance of higher doses. Highest mitotane level was 10.2 on 2/11/19. Now taking 1500 AM-1500 afternoon-1000mg PM.   - Saw radiation oncology at [facility], radiation oncology at [facility], as well as endocrine oncology (Dr. Huertas) at [facility].   - S/p adjuvant RT by Dr. Monsivais - 5040 cGy over 30 fr (8/24/18-10/6/18).  - 2/11/19: CT C/A/P and MRI brain with contrast - no evidence of disease recurrence.  - 05/07/19: CT C/A/P with contrast - \"1. No evidence for locally recurrent disease. 2. No evidence for metastatic disease in the chest, abdomen, or pelvis. 3. A 2.5 x 2.2 cm peripherally enhancing mass in hepatic segment 5, unchanged enhancing likely " "hemangioma given the peripheral discontinuous enhancement on previous examination. No other suspicious liver lesions.\"    REVIEW OF SYSTEMS: 14 point ROS negative other than the symptoms noted above in the HPI.    PAST MEDICAL AND SURGICAL HISTORY: Reviewed today  1. Right-sided adrenocortical carcinoma.  2. MHTFR mutation with h.o mutiple miscarriages.  3. Ovarian cysts diagnosed in 2011.  4. H/o gastric bypass in 2016 for obesity.    SOCIAL HISTORY:   Social History     Tobacco Use     Smoking status: Never Smoker     Smokeless tobacco: Never Used   Substance Use Topics     Alcohol use: Yes     Comment: occ.     Drug use: No     FAMILY HISTORY:   No clustering of malignancies.    ALLERGIES:   Allergies   Allergen Reactions     Bee Venom Swelling     Ibuprofen Hives, Swelling and Other (See Comments)     CURRENT MEDICATIONS:   Current Outpatient Medications:      acetaminophen (TYLENOL) 325 MG tablet, Take 2 tablets (650 mg) by mouth every 4 hours as needed for other (multimodal surgical pain management along with NSAIDS and opioid medication as indicated based on pain control and physical function.), Disp: 150 tablet, Rfl: 0     buPROPion (WELLBUTRIN) 100 MG tablet, TAKE 1 TABLET (100 MG TOTAL) BY MOUTH 2 (TWO) TIMES A DAY., Disp: , Rfl: 3     calcium carbonate antacid 1000 MG CHEW, Take 1 tablet by mouth every morning , Disp: , Rfl:      cholecalciferol (VITAMIN D-1000 MAX ST) 1000 units TABS, Take 2,000 Units by mouth every morning , Disp: , Rfl:      diazepam (VALIUM) 5 MG tablet, Take 1 tablet (5 mg) by mouth once as needed for anxiety (MRI claustrophobia management), Disp: 2 tablet, Rfl: 3     DRONABINOL PO, by Other route every other day Vape, Disp: , Rfl:      ferrous sulfate (IRON) 325 (65 Fe) MG tablet, Take 325 mg by mouth daily, Disp: , Rfl:      fludrocortisone (FLORINEF) 0.1 MG tablet, Take 0.5 tablets (0.05 mg) by mouth daily, Disp: 45 tablet, Rfl: 3     FLUoxetine (PROZAC) 40 MG capsule, Take 40 mg " "by mouth daily, Disp: , Rfl:      gabapentin (NEURONTIN) 300 MG capsule, Take 300 mg by mouth 3 times daily, Disp: , Rfl:      hydrocortisone (CORTEF) 10 MG tablet, Take 3 pills (=30 mg) in AM and 2 pills (=20 mg) at 2 pm daily, additional as directed., Disp: 180 tablet, Rfl: 11     levothyroxine (SYNTHROID/LEVOTHROID) 125 MCG tablet, Take 1 tablet (125 mcg) by mouth daily, Disp: 90 tablet, Rfl: 3     melatonin 3 MG CAPS, Take 1 capsule by mouth At Bedtime, Disp: 30 capsule, Rfl: 11     mitotane (LYSODREN) 500 MG tablet CHEMO, 1500mg in morning, 1500mg in early afternoon and 1000mg in early evening for a total daily dose of 4000mg., Disp: 240 tablet, Rfl: 0     Multiple Vitamins-Calcium (ONE-A-DAY WOMENS PO), Take 2 tablets by mouth every morning , Disp: , Rfl:      pantoprazole (PROTONIX) 40 MG EC tablet, Take 1 tablet (40 mg) by mouth daily, Disp: 30 tablet, Rfl: 0     prochlorperazine (COMPAZINE) 5 MG tablet, Take 1 tablet (5 mg) by mouth every 8 hours as needed for nausea or vomiting, Disp: 90 tablet, Rfl: 3     traZODone (DESYREL) 50 MG tablet, Take 2 tablets (100 mg) by mouth nightly as needed for sleep, Disp: 60 tablet, Rfl: 0     triamcinolone (KENALOG) 0.1 % lotion, Apply topically 3 times daily To rash on chest and shoudler, Disp: 60 mL, Rfl: 1     UNABLE TO FIND, MEDICATION NAME: Amoxicillan, Disp: , Rfl:      EPINEPHrine (EPIPEN/ADRENACLICK/OR ANY BX GENERIC EQUIV) 0.3 MG/0.3ML injection 2-pack, As directed for bee stings, Disp: , Rfl:      hydrocortisone sodium succinate PF (SOLU-CORTEF) 100 MG injection, Inject 2 mLs (100 mg) into the muscle once for 1 dose Use in emergency situations as discussed in clinic., Disp: 2 mL, Rfl: 1    PHYSICAL EXAMINATION:  Vital signs: /72 (BP Location: Right arm, Patient Position: Sitting, Cuff Size: Adult Regular)   Pulse 93   Temp 97.8  F (36.6  C) (Oral)   Resp 18   Ht 1.626 m (5' 4.02\")   Wt 68.9 kg (151 lb 14.4 oz)   SpO2 100%   BMI 26.06 kg/m    ECOG " performance status of 1.   GENERAL: Young lady in chair, in NAD  HEENT: No icterus, no pallor. MMM, PERRL, OP clear.   NECK: Supple, no JVD/LAD.  LUNGS: CTAB, normal WOB on RA   CARDIOVASCULAR: Regular rate and rhythm, no murmurs, gallops or rubs, normal S1/S2.   ABDOMEN: Well-healing abdominal incision without fluctuance or exudate. Soft, NT, ND, no masses appreciated, normal BS.   EXTREMITIES: No cyanosis, no clubbing, no edema.   NEUROLOGIC: No focal deficits, CN 2-12 intact. Linear thought process.    LYMPH NODE EXAM: No palpable adenopathy - cervical, supraclavicular.      LABORATORY DATA:   Lab Test 06/18/19  1230 05/08/19  1013 04/10/19  1030   WBC 4.1 7.2 4.4   RBC 4.37 4.73 4.96   HGB 11.0* 11.7 11.6*   HCT 36.1 37.6 39.8   MCV 83 80 80   MCH 25.2* 24.7* 23.4*   MCHC 30.5* 31.1* 29.1*   RDW 15.2* 15.9* 17.5*    209 281   NEUTROPHIL 73.8 87.8 74.0    137 137   POTASSIUM 3.2* 3.4 3.0*   CHLORIDE 105 105 103   CO2 21 24 27   ANIONGAP 10 8 7   * 85 75   BUN 6* 8 6*   CR 0.55 0.64 0.57   SHASHA 7.6* 8.0* 8.1*   PROTTOTAL 5.9* 5.7* 6.3*   ALBUMIN 2.9* 3.0* 3.1*   BILITOTAL 0.2 0.4 0.3   ALKPHOS 107 128 189*   AST 20 24 40   ALT 21 24 30     Lab Test 06/18/19  1230 05/08/19  1013 04/10/19  1030 02/12/19  0949 12/03/18   TSH  --  0.27* 0.71 0.76 2.3   T4 1.00 0.98 1.06 0.82 0.7*     Lab Test 05/08/19  1013 04/10/19  1030   CHOL 189 201*    106   LDL 52 60   TRIG 121 174*   DHEAS was 740 on 6/5/18, and has been <15 on 7/12/18, 8/20/18, 9/19/18, 10/23/18, 11/27/18, 1/9/19, 5/8/19. Pending from today.  Mitotane level (target 14-20): 1.8ng/dl on 8/20/18, 3.5 on 10/25/18, 6.2 on 12/4/18, 8.1 on 1/9/19 and 10.2 on 2/11/19. Pending from today.    IMAGING STUDIES:  As above.    ASSESSMENT AND PLAN: A 33-year-old lady with right-sided functioning adrenocortical carcinoma, who is here for follow-up.     Adrenocortical ca:  - Started on adjuvant mitotane in July 2018 based on her presentation and tumor  "characteristics including invasion of the adrenal capsule, high mitotic rate, possibly positive margins, and high Ki67, which could result in a rate of recurrence as high as 40%.    -Restaging CT C/A/P from 05/07/19 that continues to show no evidence of disease recurrence. No evidence of clinical or biochemical recurrence either.   - Despite multiple supportive care interventions, patient has NOT been able to tolerate higher doses (above 4gm/day) of mitotane. The trial of dose increase to 1500mg TID has failed as well.   - We've had numerous conversations regarding goals of therapy and that mitotane doesn't work on an \"all or none\" basis with no activity below therapeutic level. In her case, it is critical to not aim for a number but to actually aim for optimizing tolerance to therapy to allow continuation for another 4.5 years. Suzy and her  are in agreement.   - Will continue dose at 1500mg QAM, 1500mg in early afternoon and 1000mg in early evening for a total daily dose of 4gm as patient is tolerating it very well overall.   - Encouraged to do aerobic exercise 15-30 min QAM and decrease compazine to 5mg BID to decrease the severity of fatigue.  - Monthly Mitotane level, CBC, comprehensive panel, TSH, FT4, cholesterol panel, 24-hour Urine Free Cortisol, and DHEAS.   - Plan to continue adjuvant Mitotane for up to 5 yrs if tolerated.  - Next restaging CT C/A/P in 2 months.  - Have asked for a delay in the elective cosmetic surgery until she's completed at least a year of adjuvant mitotane with minimal scheduled interruptions.    Multifactorial nausea:  - While there are plenty of medical interventions that one could undertake to control nausea including olanzapine, patient has fallen and had this unexplained episode of dizziness and confusion, possibly due to polypharmacy in early 2019.   - Advised to use Compazine sparingly as above.    Concern for ativan use disorder:  - I counseled her of the addictive " potential of Ativan and she did agree that she has felt increasingly desiring this medication.   - We have stopped Ativan and I would encourage other medical providers to not give any further refills.  - Discussed health coping strategies including cognitive behavioral interventions. Seen by our psychology team as well.     Insomnia:  - Trazodone is working well but she's taking 100mg at bedtime. Will give refills PRN.   - EKG with QTc 488 ms in April - recheck in 2019.    Endocrinopathies:  - Mgmt per endocrine team at the .     Iron deficiency anemia:  - MCV and iron panel suggests iron deficiency, she has heavy periods and no other obvious sources of blood loss. MCV improving now but patient notices daily spotting, which she'll discuss with her Gyn.  - She's got IUD placed in 2019. Monitor.    Return to see me in 1 month with labs. Patient and family given opportunity to ask questions, which were answered to their satisfaction. They're in complete agreement as planned.    BILLIN.    Benson Cintron M.D.  Maryt. Professor of Medicine  Genitourinary Oncology  Division of Hematology, Oncology & Transplantation  [facility]    CC: Edwin Huertas MD   [facility], [facility]

## 2019-06-18 NOTE — LETTER
"6/18/2019       RE: Suzy Rodríguez  [address]  [facility] [address]     Dear Colleague,    Thank you for referring your patient, Suzy Rodríguez, to the [facility]. Please see a copy of my visit note below.    MEDICAL ONCOLOGY CLINIC NOTE    REFERRING PROVIDER: Warren Mansfield MD from [facility].     REASON FOR CURRENT VISIT: Follow-up while on mitotane as adjuvant therapy of adrenocortical carcinoma.      HISTORY OF PRESENT ILLNESS: Ms. 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 Her oncologic history is detailed as under.     Suzy was doing very well on mitotane 1500mg AM - 1500mg afternoon and 1000mg HS. Using Compazine 10mg BID for nausea and has 2 lose BM a day (slightly worse since starting Amoxicillin for an emergency root canal Rx last week). Fatigue is now severe. Also on hydrocortisone 30mg QAM, 20 early PM. No recurrence of episode of dizziness/LOC. No history of CVA/TIA/palpitations/CP/SOA etc. Not exercising much.     Last visit with Dr. Hilton at [facility] was on 2/26/19. She also follows with Dr. Veena Jaquez in our endocrinology department. Has also met with Ector Marcelino, our psychologist, and is feeling better overall, but does describe symptoms of intermittent anxiety.     No clinical evidence of disease recurrence.     ONCOLOGIC HISTORY:   1. 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   - CT abdomen and pelvis stone protocol without contrast 5 7110 showed \"9.2 x 8 cm heterogeneous right upper quadrant mass with small foci of calcification within it which is likely arising from the adrenal gland though inseparable from liver and upper pole of right " "kidney. It is incompletely evaluated on this noncontrast study. 3 x 2.6 cm mass right lobe of liver on image #85 also incompletely evaluated. 6 x 4 x 4 mm upper third left ureteral obstructing stone with mild to moderate secondary hydronephrosis. Collection of stones at lower pole left kidney extending over an area of approximately 6 x 7 x 4 mm. Additional nonobstructing 1 mm stone upper pole left kidney. 2 mm nonobstructing stone noted upper pole right kidney with no hydronephrosis on the right. Postop change consistent with gastric bypass. Noncontrast images of spleen, left adrenal gland, gallbladder, and pancreas unremarkable. No aneurysm. No adenopathy.\"  - Seen by Dr. Delvalle at [facility]. Referred to Dr. Alvino Patel and then Dr. Warren Mansfield.  - Endocrinology team directed workup showed high DHEAS level of 740 pre-surgery.   - Right open adrenalectomy and hepatic mobilization performed by Dr. Warren Mansfield on 6/25/2018. Pathology showed adrenocortical carcinoma measuring 11.3 cm, weighing 413 g with extension into or through the adrenal capsule negative lymphovascular invasion, tumor necrosis present, margins involved by tumor, no regional lymph nodes identified, pathologic stage T2 NX.  - DHEAS normalized postsurgery.   - Adjuvant Mitotane started on 7/24/18. Dose increased to 2000mg TID around 10/23/18 due to persistently low troughs. Held 1/16/19 for two weeks and resumed 1/30 at 1000 mg po BID due to very poor tolerance of higher doses. Highest mitotane level was 10.2 on 2/11/19. Now taking 1500 AM-1500 afternoon-1000mg PM.   - Saw radiation oncology at [facility], radiation oncology at [facility], as well as endocrine oncology (Dr. Huertas) at [facility].   - S/p adjuvant RT by Dr. Monsivais - 5040 cGy over 30 fr (8/24/18-10/6/18).  - 2/11/19: CT C/A/P and MRI brain with contrast - no evidence of disease recurrence.  - 05/07/19: CT C/A/P with " "contrast - \"1. No evidence for locally recurrent disease. 2. No evidence for metastatic disease in the chest, abdomen, or pelvis. 3. A 2.5 x 2.2 cm peripherally enhancing mass in hepatic segment 5, unchanged enhancing likely hemangioma given the peripheral discontinuous enhancement on previous examination. No other suspicious liver lesions.\"    REVIEW OF SYSTEMS: 14 point ROS negative other than the symptoms noted above in the HPI.    PAST MEDICAL AND SURGICAL HISTORY: Reviewed today  1. Right-sided adrenocortical carcinoma.  2. MHTFR mutation with h.o mutiple miscarriages.  3. Ovarian cysts diagnosed in 2011.  4. H/o gastric bypass in 2016 for obesity.    SOCIAL HISTORY:   Social History     Tobacco Use     Smoking status: Never Smoker     Smokeless tobacco: Never Used   Substance Use Topics     Alcohol use: Yes     Comment: occ.     Drug use: No     FAMILY HISTORY:   No clustering of malignancies.    ALLERGIES:   Allergies   Allergen Reactions     Bee Venom Swelling     Ibuprofen Hives, Swelling and Other (See Comments)     CURRENT MEDICATIONS:   Current Outpatient Medications:      acetaminophen (TYLENOL) 325 MG tablet, Take 2 tablets (650 mg) by mouth every 4 hours as needed for other (multimodal surgical pain management along with NSAIDS and opioid medication as indicated based on pain control and physical function.), Disp: 150 tablet, Rfl: 0     buPROPion (WELLBUTRIN) 100 MG tablet, TAKE 1 TABLET (100 MG TOTAL) BY MOUTH 2 (TWO) TIMES A DAY., Disp: , Rfl: 3     calcium carbonate antacid 1000 MG CHEW, Take 1 tablet by mouth every morning , Disp: , Rfl:      cholecalciferol (VITAMIN D-1000 MAX ST) 1000 units TABS, Take 2,000 Units by mouth every morning , Disp: , Rfl:      diazepam (VALIUM) 5 MG tablet, Take 1 tablet (5 mg) by mouth once as needed for anxiety (MRI claustrophobia management), Disp: 2 tablet, Rfl: 3     DRONABINOL PO, by Other route every other day Vape, Disp: , Rfl:      ferrous sulfate (IRON) 325 " (65 Fe) MG tablet, Take 325 mg by mouth daily, Disp: , Rfl:      fludrocortisone (FLORINEF) 0.1 MG tablet, Take 0.5 tablets (0.05 mg) by mouth daily, Disp: 45 tablet, Rfl: 3     FLUoxetine (PROZAC) 40 MG capsule, Take 40 mg by mouth daily, Disp: , Rfl:      gabapentin (NEURONTIN) 300 MG capsule, Take 300 mg by mouth 3 times daily, Disp: , Rfl:      hydrocortisone (CORTEF) 10 MG tablet, Take 3 pills (=30 mg) in AM and 2 pills (=20 mg) at 2 pm daily, additional as directed., Disp: 180 tablet, Rfl: 11     levothyroxine (SYNTHROID/LEVOTHROID) 125 MCG tablet, Take 1 tablet (125 mcg) by mouth daily, Disp: 90 tablet, Rfl: 3     melatonin 3 MG CAPS, Take 1 capsule by mouth At Bedtime, Disp: 30 capsule, Rfl: 11     mitotane (LYSODREN) 500 MG tablet CHEMO, 1500mg in morning, 1500mg in early afternoon and 1000mg in early evening for a total daily dose of 4000mg., Disp: 240 tablet, Rfl: 0     Multiple Vitamins-Calcium (ONE-A-DAY WOMENS PO), Take 2 tablets by mouth every morning , Disp: , Rfl:      pantoprazole (PROTONIX) 40 MG EC tablet, Take 1 tablet (40 mg) by mouth daily, Disp: 30 tablet, Rfl: 0     prochlorperazine (COMPAZINE) 5 MG tablet, Take 1 tablet (5 mg) by mouth every 8 hours as needed for nausea or vomiting, Disp: 90 tablet, Rfl: 3     traZODone (DESYREL) 50 MG tablet, Take 2 tablets (100 mg) by mouth nightly as needed for sleep, Disp: 60 tablet, Rfl: 0     triamcinolone (KENALOG) 0.1 % lotion, Apply topically 3 times daily To rash on chest and shoudler, Disp: 60 mL, Rfl: 1     UNABLE TO FIND, MEDICATION NAME: Amoxicillan, Disp: , Rfl:      EPINEPHrine (EPIPEN/ADRENACLICK/OR ANY BX GENERIC EQUIV) 0.3 MG/0.3ML injection 2-pack, As directed for bee stings, Disp: , Rfl:      hydrocortisone sodium succinate PF (SOLU-CORTEF) 100 MG injection, Inject 2 mLs (100 mg) into the muscle once for 1 dose Use in emergency situations as discussed in clinic., Disp: 2 mL, Rfl: 1    PHYSICAL EXAMINATION:  Vital signs: /72 (BP  "Location: Right arm, Patient Position: Sitting, Cuff Size: Adult Regular)   Pulse 93   Temp 97.8  F (36.6  C) (Oral)   Resp 18   Ht 1.626 m (5' 4.02\")   Wt 68.9 kg (151 lb 14.4 oz)   SpO2 100%   BMI 26.06 kg/m     ECOG performance status of 1.   GENERAL: Young lady in chair, in NAD  HEENT: No icterus, no pallor. MMM, PERRL, OP clear.   NECK: Supple, no JVD/LAD.  LUNGS: CTAB, normal WOB on RA   CARDIOVASCULAR: Regular rate and rhythm, no murmurs, gallops or rubs, normal S1/S2.   ABDOMEN: Well-healing abdominal incision without fluctuance or exudate. Soft, NT, ND, no masses appreciated, normal BS.   EXTREMITIES: No cyanosis, no clubbing, no edema.   NEUROLOGIC: No focal deficits, CN 2-12 intact. Linear thought process.    LYMPH NODE EXAM: No palpable adenopathy - cervical, supraclavicular.      LABORATORY DATA:   Lab Test 06/18/19  1230 05/08/19  1013 04/10/19  1030   WBC 4.1 7.2 4.4   RBC 4.37 4.73 4.96   HGB 11.0* 11.7 11.6*   HCT 36.1 37.6 39.8   MCV 83 80 80   MCH 25.2* 24.7* 23.4*   MCHC 30.5* 31.1* 29.1*   RDW 15.2* 15.9* 17.5*    209 281   NEUTROPHIL 73.8 87.8 74.0    137 137   POTASSIUM 3.2* 3.4 3.0*   CHLORIDE 105 105 103   CO2 21 24 27   ANIONGAP 10 8 7   * 85 75   BUN 6* 8 6*   CR 0.55 0.64 0.57   SHASHA 7.6* 8.0* 8.1*   PROTTOTAL 5.9* 5.7* 6.3*   ALBUMIN 2.9* 3.0* 3.1*   BILITOTAL 0.2 0.4 0.3   ALKPHOS 107 128 189*   AST 20 24 40   ALT 21 24 30     Lab Test 06/18/19  1230 05/08/19  1013 04/10/19  1030 02/12/19  0949 12/03/18   TSH  --  0.27* 0.71 0.76 2.3   T4 1.00 0.98 1.06 0.82 0.7*     Lab Test 05/08/19  1013 04/10/19  1030   CHOL 189 201*    106   LDL 52 60   TRIG 121 174*   DHEAS was 740 on 6/5/18, and has been <15 on 7/12/18, 8/20/18, 9/19/18, 10/23/18, 11/27/18, 1/9/19, 5/8/19. Pending from today.  Mitotane level (target 14-20): 1.8ng/dl on 8/20/18, 3.5 on 10/25/18, 6.2 on 12/4/18, 8.1 on 1/9/19 and 10.2 on 2/11/19. Pending from today.    IMAGING STUDIES:  As " "above.    ASSESSMENT AND PLAN: A 33-year-old lady with right-sided functioning adrenocortical carcinoma, who is here for follow-up.     Adrenocortical ca:  - Started on adjuvant mitotane in July 2018 based on her presentation and tumor characteristics including invasion of the adrenal capsule, high mitotic rate, possibly positive margins, and high Ki67, which could result in a rate of recurrence as high as 40%.    -Restaging CT C/A/P from 05/07/19 that continues to show no evidence of disease recurrence. No evidence of clinical or biochemical recurrence either.   - Despite multiple supportive care interventions, patient has NOT been able to tolerate higher doses (above 4gm/day) of mitotane. The trial of dose increase to 1500mg TID has failed as well.   - We've had numerous conversations regarding goals of therapy and that mitotane doesn't work on an \"all or none\" basis with no activity below therapeutic level. In her case, it is critical to not aim for a number but to actually aim for optimizing tolerance to therapy to allow continuation for another 4.5 years. Suzy and her  are in agreement.   - Will continue dose at 1500mg QAM, 1500mg in early afternoon and 1000mg in early evening for a total daily dose of 4gm as patient is tolerating it very well overall.   - Encouraged to do aerobic exercise 15-30 min QAM and decrease compazine to 5mg BID to decrease the severity of fatigue.  - Monthly Mitotane level, CBC, comprehensive panel, TSH, FT4, cholesterol panel, 24-hour Urine Free Cortisol, and DHEAS.   - Plan to continue adjuvant Mitotane for up to 5 yrs if tolerated.  - Next restaging CT C/A/P in 2 months.  - Have asked for a delay in the elective cosmetic surgery until she's completed at least a year of adjuvant mitotane with minimal scheduled interruptions.    Multifactorial nausea:  - While there are plenty of medical interventions that one could undertake to control nausea including olanzapine, patient " has fallen and had this unexplained episode of dizziness and confusion, possibly due to polypharmacy in early .   - Advised to use Compazine sparingly as above.    Concern for ativan use disorder:  - I counseled her of the addictive potential of Ativan and she did agree that she has felt increasingly desiring this medication.   - We have stopped Ativan and I would encourage other medical providers to not give any further refills.  - Discussed health coping strategies including cognitive behavioral interventions. Seen by our psychology team as well.     Insomnia:  - Trazodone is working well but she's taking 100mg at bedtime. Will give refills PRN.   - EKG with QTc 488 ms in April - recheck in 2019.    Endocrinopathies:  - Mgmt per endocrine team at the .     Iron deficiency anemia:  - MCV and iron panel suggests iron deficiency, she has heavy periods and no other obvious sources of blood loss. MCV improving now but patient notices daily spotting, which she'll discuss with her Gyn.  - She's got IUD placed in 2019. Monitor.    Return to see me in 1 month with labs. Patient and family given opportunity to ask questions, which were answered to their satisfaction. They're in complete agreement as planned.    BILLIN.    Benson Cintron M.D.  . Professor of Medicine  Genitourinary Oncology  Division of Hematology, Oncology & Transplantation  [facility]    CC: Edwin Huertas MD   [facility], [facility]

## 2019-06-19 LAB — DHEA-S SERPL-MCNC: <15 UG/DL (ref 35–430)

## 2019-06-20 LAB — MISCELLANEOUS TEST: NORMAL

## 2019-06-21 ENCOUNTER — COMMUNICATION - HEALTHEAST (OUTPATIENT)
Dept: FAMILY MEDICINE | Facility: CLINIC | Age: 34
End: 2019-06-21

## 2019-06-21 LAB — LAB SCANNED RESULT: NORMAL

## 2019-06-24 ENCOUNTER — COMMUNICATION - HEALTHEAST (OUTPATIENT)
Dept: ADMINISTRATIVE | Facility: CLINIC | Age: 34
End: 2019-06-24

## 2019-07-03 ENCOUNTER — DOCUMENTATION ONLY (OUTPATIENT)
Dept: ONCOLOGY | Facility: CLINIC | Age: 34
End: 2019-07-03

## 2019-07-04 NOTE — PROGRESS NOTES
Disability paperwork completed, signed and faxed to Disability Claims @ [phone]. A copy was made, filed and original mailed to patient at home address.  My chart message sent to patient to advise of this.  Rhiannon Man CMA

## 2019-07-05 ENCOUNTER — TELEPHONE (OUTPATIENT)
Dept: ONCOLOGY | Facility: CLINIC | Age: 34
End: 2019-07-05

## 2019-07-05 NOTE — ORAL ONC MGMT
Oral Chemotherapy Monitoring Program     Attempted to contact patient today for follow up regarding oral chemotherapy, mitotane, for adrenocortical carcinoma to review recommendations made by Dr. Cintron at 6/18 appointment. No answer. Voicemail box was full, unable to leave voicemail.    Mala Pettit, Pharm.D., [facility]  [phone]  07/05/19

## 2019-07-17 ENCOUNTER — APPOINTMENT (OUTPATIENT)
Dept: LAB | Facility: CLINIC | Age: 34
End: 2019-07-17
Attending: INTERNAL MEDICINE
Payer: COMMERCIAL

## 2019-07-17 ENCOUNTER — ONCOLOGY VISIT (OUTPATIENT)
Dept: ONCOLOGY | Facility: CLINIC | Age: 34
End: 2019-07-17
Attending: INTERNAL MEDICINE
Payer: COMMERCIAL

## 2019-07-17 ENCOUNTER — RECORDS - HEALTHEAST (OUTPATIENT)
Dept: ADMINISTRATIVE | Facility: OTHER | Age: 34
End: 2019-07-17

## 2019-07-17 VITALS
OXYGEN SATURATION: 98 % | HEIGHT: 64 IN | WEIGHT: 146 LBS | DIASTOLIC BLOOD PRESSURE: 85 MMHG | TEMPERATURE: 98.1 F | BODY MASS INDEX: 24.92 KG/M2 | SYSTOLIC BLOOD PRESSURE: 131 MMHG | RESPIRATION RATE: 18 BRPM | HEART RATE: 79 BPM

## 2019-07-17 DIAGNOSIS — M62.81 GENERALIZED MUSCLE WEAKNESS: Primary | ICD-10-CM

## 2019-07-17 DIAGNOSIS — C74.01 MALIGNANT NEOPLASM OF CORTEX OF RIGHT ADRENAL GLAND (H): ICD-10-CM

## 2019-07-17 LAB
ALBUMIN SERPL-MCNC: 3.4 G/DL (ref 3.4–5)
ALP SERPL-CCNC: 133 U/L (ref 40–150)
ALT SERPL W P-5'-P-CCNC: 28 U/L (ref 0–50)
ANION GAP SERPL CALCULATED.3IONS-SCNC: 6 MMOL/L (ref 3–14)
AST SERPL W P-5'-P-CCNC: 24 U/L (ref 0–45)
BASOPHILS # BLD AUTO: 0 10E9/L (ref 0–0.2)
BASOPHILS NFR BLD AUTO: 0.5 %
BILIRUB SERPL-MCNC: 0.3 MG/DL (ref 0.2–1.3)
BUN SERPL-MCNC: 7 MG/DL (ref 7–30)
CALCIUM SERPL-MCNC: 8 MG/DL (ref 8.5–10.1)
CHLORIDE SERPL-SCNC: 104 MMOL/L (ref 94–109)
CHOLEST SERPL-MCNC: 183 MG/DL
CO2 SERPL-SCNC: 27 MMOL/L (ref 20–32)
CREAT SERPL-MCNC: 0.71 MG/DL (ref 0.52–1.04)
DIFFERENTIAL METHOD BLD: ABNORMAL
EOSINOPHIL # BLD AUTO: 0 10E9/L (ref 0–0.7)
EOSINOPHIL NFR BLD AUTO: 0.4 %
ERYTHROCYTE [DISTWIDTH] IN BLOOD BY AUTOMATED COUNT: 14.9 % (ref 10–15)
GFR SERPL CREATININE-BSD FRML MDRD: >90 ML/MIN/{1.73_M2}
GLUCOSE SERPL-MCNC: 82 MG/DL (ref 70–99)
HCT VFR BLD AUTO: 39.4 % (ref 35–47)
HDLC SERPL-MCNC: 117 MG/DL
HGB BLD-MCNC: 12 G/DL (ref 11.7–15.7)
IMM GRANULOCYTES # BLD: 0 10E9/L (ref 0–0.4)
IMM GRANULOCYTES NFR BLD: 0.2 %
LDLC SERPL CALC-MCNC: 44 MG/DL
LYMPHOCYTES # BLD AUTO: 0.6 10E9/L (ref 0.8–5.3)
LYMPHOCYTES NFR BLD AUTO: 10 %
MCH RBC QN AUTO: 24.7 PG (ref 26.5–33)
MCHC RBC AUTO-ENTMCNC: 30.5 G/DL (ref 31.5–36.5)
MCV RBC AUTO: 81 FL (ref 78–100)
MISCELLANEOUS TEST: NORMAL
MONOCYTES # BLD AUTO: 0.3 10E9/L (ref 0–1.3)
MONOCYTES NFR BLD AUTO: 4.9 %
NEUTROPHILS # BLD AUTO: 4.6 10E9/L (ref 1.6–8.3)
NEUTROPHILS NFR BLD AUTO: 84 %
NONHDLC SERPL-MCNC: 66 MG/DL
NRBC # BLD AUTO: 0 10*3/UL
NRBC BLD AUTO-RTO: 0 /100
PLATELET # BLD AUTO: 307 10E9/L (ref 150–450)
POTASSIUM SERPL-SCNC: 3.5 MMOL/L (ref 3.4–5.3)
PROT SERPL-MCNC: 6.7 G/DL (ref 6.8–8.8)
RBC # BLD AUTO: 4.86 10E12/L (ref 3.8–5.2)
SODIUM SERPL-SCNC: 137 MMOL/L (ref 133–144)
T4 FREE SERPL-MCNC: 1.14 NG/DL (ref 0.76–1.46)
TRIGL SERPL-MCNC: 110 MG/DL
TSH SERPL DL<=0.005 MIU/L-ACNC: 0.04 MU/L (ref 0.4–4)
WBC # BLD AUTO: 5.5 10E9/L (ref 4–11)

## 2019-07-17 PROCEDURE — 99214 OFFICE O/P EST MOD 30 MIN: CPT | Mod: GC | Performed by: INTERNAL MEDICINE

## 2019-07-17 PROCEDURE — 82627 DEHYDROEPIANDROSTERONE: CPT | Performed by: INTERNAL MEDICINE

## 2019-07-17 PROCEDURE — 80053 COMPREHEN METABOLIC PANEL: CPT | Performed by: INTERNAL MEDICINE

## 2019-07-17 PROCEDURE — 85025 COMPLETE CBC W/AUTO DIFF WBC: CPT | Performed by: INTERNAL MEDICINE

## 2019-07-17 PROCEDURE — 84439 ASSAY OF FREE THYROXINE: CPT | Performed by: INTERNAL MEDICINE

## 2019-07-17 PROCEDURE — 84999 UNLISTED CHEMISTRY PROCEDURE: CPT | Performed by: INTERNAL MEDICINE

## 2019-07-17 PROCEDURE — 84443 ASSAY THYROID STIM HORMONE: CPT | Performed by: INTERNAL MEDICINE

## 2019-07-17 PROCEDURE — 36415 COLL VENOUS BLD VENIPUNCTURE: CPT

## 2019-07-17 PROCEDURE — G0463 HOSPITAL OUTPT CLINIC VISIT: HCPCS | Mod: ZF

## 2019-07-17 PROCEDURE — 80061 LIPID PANEL: CPT | Performed by: INTERNAL MEDICINE

## 2019-07-17 PROCEDURE — 80375 DRUG/SUBSTANCE NOS 1-3: CPT | Performed by: INTERNAL MEDICINE

## 2019-07-17 ASSESSMENT — PAIN SCALES - GENERAL: PAINLEVEL: MODERATE PAIN (5)

## 2019-07-17 ASSESSMENT — MIFFLIN-ST. JEOR: SCORE: 1347.5

## 2019-07-17 NOTE — NURSING NOTE
"Oncology Rooming Note    July 17, 2019 1:05 PM   Suzy Rodríguez is a 33 year old female who presents for:    Chief Complaint   Patient presents with     Blood Draw     Labs drawn via  by RN in lab. VS taken.      Oncology Clinic Visit     Return visit related to Adrenal Cortical Cancer     Initial Vitals: /85 (BP Location: Right arm, Patient Position: Sitting, Cuff Size: Adult Regular)   Pulse 79   Temp 98.1  F (36.7  C) (Oral)   Resp 18   Ht 1.626 m (5' 4.02\")   Wt 66.2 kg (146 lb)   SpO2 98%   BMI 25.05 kg/m   Estimated body mass index is 25.05 kg/m  as calculated from the following:    Height as of this encounter: 1.626 m (5' 4.02\").    Weight as of this encounter: 66.2 kg (146 lb). Body surface area is 1.73 meters squared.  Moderate Pain (5) Comment: Data Unavailable   No LMP recorded.  Allergies reviewed: Yes  Medications reviewed: Yes    Medications: MEDICATION REFILLS NEEDED TODAY. Provider was notified.  Pharmacy name entered into [facility]:    [id number] - [place], [place] - [address] [place] - [address] [facility] [address] [place] [address] PHARMAC[place] - [address] [facility] - [place], [place] - [address] [place] [address] [address]  [facility] [address] [place] [facility] - [place], [place] - [address]     Clinical concerns: Refill needed today. Provider was notified.      Adia Pimentel LPN            "

## 2019-07-17 NOTE — PROGRESS NOTES
"MEDICAL ONCOLOGY CLINIC NOTE    REFERRING PROVIDER: Warren Mansfield MD from [facility] Urologic Oncology clinic.     REASON FOR CURRENT VISIT: Follow-up while on mitotane as adjuvant therapy of adrenocortical carcinoma.      HISTORY OF PRESENT ILLNESS: Ms. Rodríguez is a 33-year-old lady with history notable for right-sided functioning adrenocortical carcinoma (diagnosed in May 2018), who is here for follow-up while on adjuvant mitotane. Her oncologic history is detailed as under.     Suzy is doing OK today. She was previously doing a bit better while on the current dose of mitotane 1500mg AM - 1500mg afternoon and 1000mg HS. Also on hydrocortisone 30mg QAM, 20 early PM. No recurrence of episode of dizziness/LOC. No history of CVA/TIA/palpitations/CP/SOA etc. Using Compazine 10mg BID for nausea. She has 1-2 lose BMs a day and uses Imodium PRN. Fatigue is still quite severe. Not exercising much due to significant pain in her legs after just walking. Napping during the daytime frequently, and not sleeping well at night. Quite tremulous recently, probably worse since last month (but has been a long-standing problem). Finds that marijuana helps with the nausea and diarrhea, and also helps her sleep at night. Also using trazodone for sleep sometimes.     Last visit with Dr. Hilton at [facility] was on 2/26/19. She also follows with Dr. Veena Jaquez in our endocrinology department. Has also met with Ector Marcelino, our psychologist, and is feeling better overall, but does describe symptoms of intermittent anxiety.     No clinical evidence of disease recurrence on scans and labs.     ONCOLOGIC HISTORY:   1. 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   - CT abdomen and pelvis stone protocol without contrast 5 8018 showed \"9.2 x 8 cm " "heterogeneous right upper quadrant mass with small foci of calcification within it which is likely arising from the adrenal gland though inseparable from liver and upper pole of right kidney. It is incompletely evaluated on this noncontrast study. 3 x 2.6 cm mass right lobe of liver on image #85 also incompletely evaluated. 6 x 4 x 4 mm upper third left ureteral obstructing stone with mild to moderate secondary hydronephrosis. Collection of stones at lower pole left kidney extending over an area of approximately 6 x 7 x 4 mm. Additional nonobstructing 1 mm stone upper pole left kidney. 2 mm nonobstructing stone noted upper pole right kidney with no hydronephrosis on the right. Postop change consistent with gastric bypass. Noncontrast images of spleen, left adrenal gland, gallbladder, and pancreas unremarkable. No aneurysm. No adenopathy.\"  - Seen by Dr. Delvalle at [facility] clinic. Referred to Dr. Alvino Patel and then Dr. Warren Mansfield.  - Endocrinology team directed workup showed high DHEAS level of 740 pre-surgery.   - Right open adrenalectomy and hepatic mobilization performed by Dr. Warren Mansfield on 6/25/2018. Pathology showed adrenocortical carcinoma measuring 11.3 cm, weighing 413 g with extension into or through the adrenal capsule negative lymphovascular invasion, tumor necrosis present, margins involved by tumor, no regional lymph nodes identified, pathologic stage T2 NX.  - DHEAS normalized post-surgery.   - Adjuvant Mitotane started on 7/24/18. Dose increased to 2000mg TID around 10/23/18 due to persistently low troughs. Held 1/16/19 for two weeks and resumed 1/30 at 1000 mg po BID due to very poor tolerance of higher doses. Highest mitotane level was 10.2 on 2/11/19. Now taking 1500 AM-1500 afternoon-1000mg PM.   - Saw radiation oncology at [facility], radiation oncology at [facility], as well as endocrine oncology (Dr. Huertas) at [facility]. " "  - S/p adjuvant RT by Dr. Monsivais - 5040 cGy over 30 fr (8/24/18-10/6/18).  - 2/11/19: CT C/A/P and MRI brain with contrast - no evidence of disease recurrence.  - 05/07/19: CT C/A/P with contrast - \"1. No evidence for locally recurrent disease. 2. No evidence for metastatic disease in the chest, abdomen, or pelvis. 3. A 2.5 x 2.2 cm peripherally enhancing mass in hepatic segment 5, unchanged enhancing likely hemangioma given the peripheral discontinuous enhancement on previous examination. No other suspicious liver lesions.\"    REVIEW OF SYSTEMS: 14 point ROS negative other than the symptoms noted above in the HPI.    PAST MEDICAL AND SURGICAL HISTORY: Reviewed today.  1. Right-sided adrenocortical carcinoma.  2. MHTFR mutation with h/o mutiple miscarriages.  3. Ovarian cysts diagnosed in 2011.  4. H/o gastric bypass in 2016 for obesity.    SOCIAL HISTORY:   Social History     Tobacco Use     Smoking status: Never Smoker     Smokeless tobacco: Never Used   Substance Use Topics     Alcohol use: Yes     Comment: occ.     Drug use: No     FAMILY HISTORY:   No clustering of malignancies.    ALLERGIES:   Allergies   Allergen Reactions     Bee Venom Swelling     Ibuprofen Hives, Swelling and Other (See Comments)     CURRENT MEDICATIONS:   Current Outpatient Medications:      acetaminophen (TYLENOL) 325 MG tablet, Take 2 tablets (650 mg) by mouth every 4 hours as needed for other (multimodal surgical pain management along with NSAIDS and opioid medication as indicated based on pain control and physical function.), Disp: 150 tablet, Rfl: 0     buPROPion (WELLBUTRIN) 100 MG tablet, TAKE 1 TABLET (100 MG TOTAL) BY MOUTH 2 (TWO) TIMES A DAY., Disp: , Rfl: 3     calcium carbonate antacid 1000 MG CHEW, Take 1 tablet by mouth every morning , Disp: , Rfl:      cholecalciferol (VITAMIN D-1000 MAX ST) 1000 units TABS, Take 2,000 Units by mouth every morning , Disp: , Rfl:      diazepam (VALIUM) 5 MG tablet, Take 1 tablet (5 mg) by " mouth once as needed for anxiety (MRI claustrophobia management), Disp: 2 tablet, Rfl: 3     DRONABINOL PO, by Other route every other day Vape, Disp: , Rfl:      EPINEPHrine (EPIPEN/ADRENACLICK/OR ANY BX GENERIC EQUIV) 0.3 MG/0.3ML injection 2-pack, As directed for bee stings, Disp: , Rfl:      ferrous sulfate (IRON) 325 (65 Fe) MG tablet, Take 325 mg by mouth daily, Disp: , Rfl:      fludrocortisone (FLORINEF) 0.1 MG tablet, Take 0.5 tablets (0.05 mg) by mouth daily, Disp: 45 tablet, Rfl: 3     FLUoxetine (PROZAC) 40 MG capsule, Take 40 mg by mouth daily, Disp: , Rfl:      gabapentin (NEURONTIN) 300 MG capsule, Take 300 mg by mouth 3 times daily, Disp: , Rfl:      hydrocortisone (CORTEF) 10 MG tablet, Take 3 pills (=30 mg) in AM and 2 pills (=20 mg) at 2 pm daily, additional as directed., Disp: 180 tablet, Rfl: 11     hydrocortisone sodium succinate PF (SOLU-CORTEF) 100 MG injection, Inject 2 mLs (100 mg) into the muscle once for 1 dose Use in emergency situations as discussed in clinic., Disp: 2 mL, Rfl: 1     levothyroxine (SYNTHROID/LEVOTHROID) 125 MCG tablet, Take 1 tablet (125 mcg) by mouth daily, Disp: 90 tablet, Rfl: 3     melatonin 3 MG CAPS, Take 1 capsule by mouth At Bedtime, Disp: 30 capsule, Rfl: 11     mitotane (LYSODREN) 500 MG tablet CHEMO, 1500mg in morning, 1500mg in early afternoon and 1000mg in early evening for a total daily dose of 4000mg., Disp: 240 tablet, Rfl: 0     Multiple Vitamins-Calcium (ONE-A-DAY WOMENS PO), Take 2 tablets by mouth every morning , Disp: , Rfl:      pantoprazole (PROTONIX) 40 MG EC tablet, Take 1 tablet (40 mg) by mouth daily, Disp: 30 tablet, Rfl: 0     prochlorperazine (COMPAZINE) 5 MG tablet, Take 1 tablet (5 mg) by mouth every 8 hours as needed for nausea or vomiting, Disp: 90 tablet, Rfl: 3     traZODone (DESYREL) 50 MG tablet, Take 2 tablets (100 mg) by mouth nightly as needed for sleep, Disp: 60 tablet, Rfl: 0     triamcinolone (KENALOG) 0.1 % lotion, Apply  "topically 3 times daily To rash on chest and shoudler, Disp: 60 mL, Rfl: 1     UNABLE TO FIND, MEDICATION NAME: Amoxicillan, Disp: , Rfl:     PHYSICAL EXAMINATION:  Vital signs: /85 (BP Location: Right arm, Patient Position: Sitting, Cuff Size: Adult Regular)   Pulse 79   Temp 98.1  F (36.7  C) (Oral)   Resp 18   Ht 1.626 m (5' 4.02\")   Wt 66.2 kg (146 lb)   SpO2 98%   BMI 25.05 kg/m    ECOG performance status of 1.   GENERAL: Young lady, lying on exam table. Appears tremulous and fatigued, but in no acute distress.  HEENT: No icterus, no pallor. MMM, PERRL, OP clear.   NECK: Supple, no JVD/LAD.  LUNGS: CTAB, normal WOB on RA.  CARDIOVASCULAR: Regular rate and rhythm, no murmurs.   ABDOMEN: Well-healing posterior right sided abdominal incision without concerning findings. Soft, NT, ND, no masses appreciated, normal BS.   EXTREMITIES: No cyanosis, no clubbing, no edema.   NEUROLOGIC: Alert and fully oriented. Hands are tremulous. Also subtle spasms/tremors of the facial muscles bilaterally. Linear thought process.    LYMPH NODE EXAM: No palpable adenopathy - cervical, supraclavicular or axillary.    LABORATORY DATA:   Lab Test 07/17/19  1241 06/18/19  1230 05/08/19  1013   WBC 5.5 4.1 7.2   RBC 4.86 4.37 4.73   HGB 12.0 11.0* 11.7   HCT 39.4 36.1 37.6   MCV 81 83 80   MCH 24.7* 25.2* 24.7*   MCHC 30.5* 30.5* 31.1*   RDW 14.9 15.2* 15.9*    250 209   NEUTROPHIL 84.0 73.8 87.8    137 137   POTASSIUM 3.5 3.2* 3.4   CHLORIDE 104 105 105   CO2 27 21 24   ANIONGAP 6 10 8   GLC 82 160* 85   BUN 7 6* 8   CR 0.71 0.55 0.64   SHASHA 8.0* 7.6* 8.0*   PROTTOTAL 6.7* 5.9* 5.7*   ALBUMIN 3.4 2.9* 3.0*   BILITOTAL 0.3 0.2 0.4   ALKPHOS 133 107 128   AST 24 20 24   ALT 28 21 24     Lab Test 07/17/19  1241 06/18/19  1230 05/08/19  1013 04/10/19  1030 02/12/19  0949   TSH 0.04* 0.09* 0.27* 0.71 0.76   T4 1.14 1.00 0.98 1.06 0.82     Lab Test 07/17/19  1241 05/08/19  1013   CHOL 183 189    112   LDL 44 52 " "  TRIG 110 121     DHEAS was 740 on 6/5/18, and has been <15 on 7/12/18, 8/20/18, 9/19/18, 10/23/18, 11/27/18, 1/9/19, 5/8/19, 6/18/19. Pending from today.  Mitotane level (target 14-20): 1.8ng/dl on 8/20/18, 3.5 on 10/25/18, 6.2 on 12/4/18, 8.1 on 1/9/19 and 10.2 on 2/11/19, 14.2 on 6/18/19. Pending from today.    IMAGING STUDIES:  As above.    ASSESSMENT AND PLAN: A 33-year-old lady with right-sided functioning adrenocortical carcinoma, who is here for follow-up while on adjuvant mitotane.     Adrenocortical carcinoma:  - Started on adjuvant mitotane in July 2018 based on her presentation and tumor characteristics including invasion of the adrenal capsule, high mitotic rate, possibly positive margins, and high Ki67, which could result in a rate of recurrence as high as 40%.    - Restaging CT C/A/P from 05/07/19 continues to show no evidence of disease recurrence. No evidence of clinical or biochemical recurrence either.   - Despite multiple supportive care interventions, patient has NOT been able to tolerate higher doses (above 4gm/day) of mitotane. The trial of dose increase to 1500mg TID has failed as well. Now with significant side effects including tremulousness, nausea and diarrhea which suggest mitotane level from today might be elevated. We discussed reducing the dose of mitotane, and while hesitant, Suzy agreed to do a lower dose until the drug level from today returns. Will try 1500mg QAM, 1500mg in the afternoon, and 500mg in the evening. Will also have her move up the last dose of the day to 6PM to see if this helps with her sleep.  - We've had numerous conversations regarding goals of therapy and that mitotane doesn't work on an \"all or none\" basis with no activity below therapeutic level. In her case, it is critical to not aim for a number but to actually aim for optimizing tolerance to therapy to allow continuation for another 4 years. Suzy and her  are in agreement.   - Discussed " referral to PT to optimize her exercise routine given significant myalgias with just walking.  - Monthly Mitotane level, CBC, comprehensive panel, TSH, FT4, cholesterol panel, 24-hour Urine Free Cortisol, and DHEAS.   - Plan to continue adjuvant Mitotane for up to 5 yrs if tolerated.  - Next restaging CT C/A/P in 1 month.    Multifactorial nausea:  - While there are plenty of medical interventions that one could undertake to control nausea including olanzapine, patient has had some falls and unexplained episodes of dizziness and confusion, possibly due to polypharmacy in early .   - Advised to use Compazine sparingly as above.    Concern for ativan use disorder:  - I counseled her of the addictive potential of Ativan and she did agree that she has felt increasingly desiring this medication.   - We have stopped Ativan and I would encourage other medical providers to not give any further refills.  - Discussed health coping strategies including cognitive behavioral interventions. Seen by our psychology team as well.     Insomnia:  - Trazodone is working well but she's taking 100mg at bedtime. Will give refills PRN.   - EKG with QTc 488 ms in April - recheck at next clinic visit.    Endocrinopathies:  - Mgmt per endocrine team at the . Follow-up planned in the next couple months.    Return to see me in 1 month with labs and CT C/A/P. Patient and family given opportunity to ask questions, which were answered to their satisfaction. They're in complete agreement as planned.    Patient was seen and discussed with attending physician Dr. Cintron.    Leigh Roberson MD/PhD  Heme/Onc Fellow      ATTENDING ATTESTATION NOTE:  I saw the patient with Dr. Roberson and discussed all pertinent clinical data including labs and imaging. The plan above was made under my supervision and accurately reflects the A/P after my examination and discussion with the patient.     BILLIN.     Benson Cintron M.D.  Maryt. Professor of  Medicine  Genitourinary Oncology  Division of Hematology, Oncology & Transplantation  [facility]

## 2019-07-17 NOTE — LETTER
7/17/2019       RE: Suzy Rodríguez  [address]  [facility] [id number]     Dear Colleague,    Thank you for referring your patient, Suzy Rodríguez, to the [facility]. Please see a copy of my visit note below.    MEDICAL ONCOLOGY CLINIC NOTE    REFERRING PROVIDER: Warren Mansfield MD from [facility] Urologic Oncology clinic.     REASON FOR CURRENT VISIT: Follow-up while on mitotane as adjuvant therapy of adrenocortical carcinoma.      HISTORY OF PRESENT ILLNESS: Ms. Rodríguez is a 33-year-old lady with history notable for right-sided functioning adrenocortical carcinoma (diagnosed in May 2018), who is here for follow-up while on adjuvant mitotane. Her oncologic history is detailed as under.     Suzy is doing OK today. She was previously doing a bit better while on the current dose of mitotane 1500mg AM - 1500mg afternoon and 1000mg HS. Also on hydrocortisone 30mg QAM, 20 early PM. No recurrence of episode of dizziness/LOC. No history of CVA/TIA/palpitations/CP/SOA etc. Using Compazine 10mg BID for nausea. She has 1-2 lose BMs a day and uses Imodium PRN. Fatigue is still quite severe. Not exercising much due to significant pain in her legs after just walking. Napping during the daytime frequently, and not sleeping well at night. Quite tremulous recently, probably worse since last month (but has been a long-standing problem). Finds that marijuana helps with the nausea and diarrhea, and also helps her sleep at night. Also using trazodone for sleep sometimes.     Last visit with Dr. Hilton at [facility] was on 2/26/19. She also follows with Dr. Veena Jaquez in our endocrinology department. Has also met with Ector Marcelino, our psychologist, and is feeling better overall, but does describe symptoms of intermittent anxiety.     No clinical evidence of disease recurrence on scans and labs.     ONCOLOGIC HISTORY:   1. Right adrenocortical carcinoma, localized, high-risk (Ki67  "20%; adrenal capsular invasion present, mitotic rate 15 per 50 HPF):  - Presented to emergency room on 5/8/2018 with acute onset left flank pain.   - CT abdomen and pelvis stone protocol without contrast 5 8018 showed \"9.2 x 8 cm heterogeneous right upper quadrant mass with small foci of calcification within it which is likely arising from the adrenal gland though inseparable from liver and upper pole of right kidney. It is incompletely evaluated on this noncontrast study. 3 x 2.6 cm mass right lobe of liver on image #85 also incompletely evaluated. 6 x 4 x 4 mm upper third left ureteral obstructing stone with mild to moderate secondary hydronephrosis. Collection of stones at lower pole left kidney extending over an area of approximately 6 x 7 x 4 mm. Additional nonobstructing 1 mm stone upper pole left kidney. 2 mm nonobstructing stone noted upper pole right kidney with no hydronephrosis on the right. Postop change consistent with gastric bypass. Noncontrast images of spleen, left adrenal gland, gallbladder, and pancreas unremarkable. No aneurysm. No adenopathy.\"  - Seen by Dr. Delvalle at [facility] clinic. Referred to Dr. Alvino Patel and then Dr. Warren Mansfield.  - Endocrinology team directed workup showed high DHEAS level of 740 pre-surgery.   - Right open adrenalectomy and hepatic mobilization performed by Dr. Warren Mansfield on 6/25/2018. Pathology showed adrenocortical carcinoma measuring 11.3 cm, weighing 413 g with extension into or through the adrenal capsule negative lymphovascular invasion, tumor necrosis present, margins involved by tumor, no regional lymph nodes identified, pathologic stage T2 NX.  - DHEAS normalized post-surgery.   - Adjuvant Mitotane started on 7/24/18. Dose increased to 2000mg TID around 10/23/18 due to persistently low troughs. Held 1/16/19 for two weeks and resumed 1/30 at 1000 mg po BID due to very poor tolerance of higher doses. Highest mitotane level was 10.2 " "on 2/11/19. Now taking 1500 AM-1500 afternoon-1000mg PM.   - Saw radiation oncology at [facility], radiation oncology at [facility], as well as endocrine oncology (Dr. Huertas) at [facility].   - S/p adjuvant RT by Dr. Monsivais - 5040 cGy over 30 fr (8/24/18-10/6/18).  - 2/11/19: CT C/A/P and MRI brain with contrast - no evidence of disease recurrence.  - 05/07/19: CT C/A/P with contrast - \"1. No evidence for locally recurrent disease. 2. No evidence for metastatic disease in the chest, abdomen, or pelvis. 3. A 2.5 x 2.2 cm peripherally enhancing mass in hepatic segment 5, unchanged enhancing likely hemangioma given the peripheral discontinuous enhancement on previous examination. No other suspicious liver lesions.\"    REVIEW OF SYSTEMS: 14 point ROS negative other than the symptoms noted above in the HPI.    PAST MEDICAL AND SURGICAL HISTORY: Reviewed today.  1. Right-sided adrenocortical carcinoma.  2. MHTFR mutation with h/o mutiple miscarriages.  3. Ovarian cysts diagnosed in 2011.  4. H/o gastric bypass in 2016 for obesity.    SOCIAL HISTORY:   Social History     Tobacco Use     Smoking status: Never Smoker     Smokeless tobacco: Never Used   Substance Use Topics     Alcohol use: Yes     Comment: occ.     Drug use: No     FAMILY HISTORY:   No clustering of malignancies.    ALLERGIES:   Allergies   Allergen Reactions     Bee Venom Swelling     Ibuprofen Hives, Swelling and Other (See Comments)     CURRENT MEDICATIONS:   Current Outpatient Medications:      acetaminophen (TYLENOL) 325 MG tablet, Take 2 tablets (650 mg) by mouth every 4 hours as needed for other (multimodal surgical pain management along with NSAIDS and opioid medication as indicated based on pain control and physical function.), Disp: 150 tablet, Rfl: 0     buPROPion (WELLBUTRIN) 100 MG tablet, TAKE 1 TABLET (100 MG TOTAL) BY MOUTH 2 (TWO) TIMES A DAY., Disp: , Rfl: 3     calcium carbonate antacid 1000 MG CHEW, " Take 1 tablet by mouth every morning , Disp: , Rfl:      cholecalciferol (VITAMIN D-1000 MAX ST) 1000 units TABS, Take 2,000 Units by mouth every morning , Disp: , Rfl:      diazepam (VALIUM) 5 MG tablet, Take 1 tablet (5 mg) by mouth once as needed for anxiety (MRI claustrophobia management), Disp: 2 tablet, Rfl: 3     DRONABINOL PO, by Other route every other day Vape, Disp: , Rfl:      EPINEPHrine (EPIPEN/ADRENACLICK/OR ANY BX GENERIC EQUIV) 0.3 MG/0.3ML injection 2-pack, As directed for bee stings, Disp: , Rfl:      ferrous sulfate (IRON) 325 (65 Fe) MG tablet, Take 325 mg by mouth daily, Disp: , Rfl:      fludrocortisone (FLORINEF) 0.1 MG tablet, Take 0.5 tablets (0.05 mg) by mouth daily, Disp: 45 tablet, Rfl: 3     FLUoxetine (PROZAC) 40 MG capsule, Take 40 mg by mouth daily, Disp: , Rfl:      gabapentin (NEURONTIN) 300 MG capsule, Take 300 mg by mouth 3 times daily, Disp: , Rfl:      hydrocortisone (CORTEF) 10 MG tablet, Take 3 pills (=30 mg) in AM and 2 pills (=20 mg) at 2 pm daily, additional as directed., Disp: 180 tablet, Rfl: 11     hydrocortisone sodium succinate PF (SOLU-CORTEF) 100 MG injection, Inject 2 mLs (100 mg) into the muscle once for 1 dose Use in emergency situations as discussed in clinic., Disp: 2 mL, Rfl: 1     levothyroxine (SYNTHROID/LEVOTHROID) 125 MCG tablet, Take 1 tablet (125 mcg) by mouth daily, Disp: 90 tablet, Rfl: 3     melatonin 3 MG CAPS, Take 1 capsule by mouth At Bedtime, Disp: 30 capsule, Rfl: 11     mitotane (LYSODREN) 500 MG tablet CHEMO, 1500mg in morning, 1500mg in early afternoon and 1000mg in early evening for a total daily dose of 4000mg., Disp: 240 tablet, Rfl: 0     Multiple Vitamins-Calcium (ONE-A-DAY WOMENS PO), Take 2 tablets by mouth every morning , Disp: , Rfl:      pantoprazole (PROTONIX) 40 MG EC tablet, Take 1 tablet (40 mg) by mouth daily, Disp: 30 tablet, Rfl: 0     prochlorperazine (COMPAZINE) 5 MG tablet, Take 1 tablet (5 mg) by mouth every 8 hours as  "needed for nausea or vomiting, Disp: 90 tablet, Rfl: 3     traZODone (DESYREL) 50 MG tablet, Take 2 tablets (100 mg) by mouth nightly as needed for sleep, Disp: 60 tablet, Rfl: 0     triamcinolone (KENALOG) 0.1 % lotion, Apply topically 3 times daily To rash on chest and shoudler, Disp: 60 mL, Rfl: 1     UNABLE TO FIND, MEDICATION NAME: Amoxicillan, Disp: , Rfl:     PHYSICAL EXAMINATION:  Vital signs: /85 (BP Location: Right arm, Patient Position: Sitting, Cuff Size: Adult Regular)   Pulse 79   Temp 98.1  F (36.7  C) (Oral)   Resp 18   Ht 1.626 m (5' 4.02\")   Wt 66.2 kg (146 lb)   SpO2 98%   BMI 25.05 kg/m     ECOG performance status of 1.   GENERAL: Young lady, lying on exam table. Appears tremulous and fatigued, but in no acute distress.  HEENT: No icterus, no pallor. MMM, PERRL, OP clear.   NECK: Supple, no JVD/LAD.  LUNGS: CTAB, normal WOB on RA.  CARDIOVASCULAR: Regular rate and rhythm, no murmurs.   ABDOMEN: Well-healing posterior right sided abdominal incision without concerning findings. Soft, NT, ND, no masses appreciated, normal BS.   EXTREMITIES: No cyanosis, no clubbing, no edema.   NEUROLOGIC: Alert and fully oriented. Hands are tremulous. Also subtle spasms/tremors of the facial muscles bilaterally. Linear thought process.    LYMPH NODE EXAM: No palpable adenopathy - cervical, supraclavicular or axillary.    LABORATORY DATA:   Lab Test 07/17/19  1241 06/18/19  1230 05/08/19  1013   WBC 5.5 4.1 7.2   RBC 4.86 4.37 4.73   HGB 12.0 11.0* 11.7   HCT 39.4 36.1 37.6   MCV 81 83 80   MCH 24.7* 25.2* 24.7*   MCHC 30.5* 30.5* 31.1*   RDW 14.9 15.2* 15.9*    250 209   NEUTROPHIL 84.0 73.8 87.8    137 137   POTASSIUM 3.5 3.2* 3.4   CHLORIDE 104 105 105   CO2 27 21 24   ANIONGAP 6 10 8   GLC 82 160* 85   BUN 7 6* 8   CR 0.71 0.55 0.64   SHASHA 8.0* 7.6* 8.0*   PROTTOTAL 6.7* 5.9* 5.7*   ALBUMIN 3.4 2.9* 3.0*   BILITOTAL 0.3 0.2 0.4   ALKPHOS 133 107 128   AST 24 20 24   ALT 28 21 24     Lab " "Test 07/17/19  1241 06/18/19  1230 05/08/19  1013 04/10/19  1030 02/12/19  0949   TSH 0.04* 0.09* 0.27* 0.71 0.76   T4 1.14 1.00 0.98 1.06 0.82     Lab Test 07/17/19  1241 05/08/19  1013   CHOL 183 189    112   LDL 44 52   TRIG 110 121     DHEAS was 740 on 6/5/18, and has been <15 on 7/12/18, 8/20/18, 9/19/18, 10/23/18, 11/27/18, 1/9/19, 5/8/19, 6/18/19. Pending from today.  Mitotane level (target 14-20): 1.8ng/dl on 8/20/18, 3.5 on 10/25/18, 6.2 on 12/4/18, 8.1 on 1/9/19 and 10.2 on 2/11/19, 14.2 on 6/18/19. Pending from today.    IMAGING STUDIES:  As above.    ASSESSMENT AND PLAN: A 33-year-old lady with right-sided functioning adrenocortical carcinoma, who is here for follow-up while on adjuvant mitotane.     Adrenocortical carcinoma:  - Started on adjuvant mitotane in July 2018 based on her presentation and tumor characteristics including invasion of the adrenal capsule, high mitotic rate, possibly positive margins, and high Ki67, which could result in a rate of recurrence as high as 40%.    - Restaging CT C/A/P from 05/07/19 continues to show no evidence of disease recurrence. No evidence of clinical or biochemical recurrence either.   - Despite multiple supportive care interventions, patient has NOT been able to tolerate higher doses (above 4gm/day) of mitotane. The trial of dose increase to 1500mg TID has failed as well. Now with significant side effects including tremulousness, nausea and diarrhea which suggest mitotane level from today might be elevated. We discussed reducing the dose of mitotane, and while hesitant, Suzy agreed to do a lower dose until the drug level from today returns. Will try 1500mg QAM, 1500mg in the afternoon, and 500mg in the evening. Will also have her move up the last dose of the day to 6PM to see if this helps with her sleep.  - We've had numerous conversations regarding goals of therapy and that mitotane doesn't work on an \"all or none\" basis with no activity below " therapeutic level. In her case, it is critical to not aim for a number but to actually aim for optimizing tolerance to therapy to allow continuation for another 4 years. Suzy and her  are in agreement.   - Discussed referral to PT to optimize her exercise routine given significant myalgias with just walking.  - Monthly Mitotane level, CBC, comprehensive panel, TSH, FT4, cholesterol panel, 24-hour Urine Free Cortisol, and DHEAS.   - Plan to continue adjuvant Mitotane for up to 5 yrs if tolerated.  - Next restaging CT C/A/P in 1 month.    Multifactorial nausea:  - While there are plenty of medical interventions that one could undertake to control nausea including olanzapine, patient has had some falls and unexplained episodes of dizziness and confusion, possibly due to polypharmacy in early 2019.   - Advised to use Compazine sparingly as above.    Concern for ativan use disorder:  - I counseled her of the addictive potential of Ativan and she did agree that she has felt increasingly desiring this medication.   - We have stopped Ativan and I would encourage other medical providers to not give any further refills.  - Discussed health coping strategies including cognitive behavioral interventions. Seen by our psychology team as well.     Insomnia:  - Trazodone is working well but she's taking 100mg at bedtime. Will give refills PRN.   - EKG with QTc 488 ms in April - recheck at next clinic visit.    Endocrinopathies:  - Mgmt per endocrine team at the . Follow-up planned in the next couple months.    Return to see me in 1 month with labs and CT C/A/P. Patient and family given opportunity to ask questions, which were answered to their satisfaction. They're in complete agreement as planned.    Patient was seen and discussed with attending physician Dr. Cintron.    Leigh Roberson MD/PhD  Heme/Onc Fellow      ATTENDING ATTESTATION NOTE:  I saw the patient with Dr. Roberson and discussed all pertinent clinical data  including labs and imaging. The plan above was made under my supervision and accurately reflects the A/P after my examination and discussion with the patient.     BILLIN.     Benson Cintron M.D.  . Professor of Medicine  Genitourinary Oncology  Division of Hematology, Oncology & Transplantation  [facility]

## 2019-07-17 NOTE — NURSING NOTE
Chief Complaint   Patient presents with     Blood Draw     Labs drawn via  by RN in lab. VS taken.      Savanna Newell RN

## 2019-07-18 LAB — DHEA-S SERPL-MCNC: <15 UG/DL (ref 35–430)

## 2019-07-19 ENCOUNTER — MYC MEDICAL ADVICE (OUTPATIENT)
Dept: ENDOCRINOLOGY | Facility: CLINIC | Age: 34
End: 2019-07-19

## 2019-07-19 ENCOUNTER — OFFICE VISIT - HEALTHEAST (OUTPATIENT)
Dept: SURGERY | Facility: CLINIC | Age: 34
End: 2019-07-19

## 2019-07-19 ENCOUNTER — AMBULATORY - HEALTHEAST (OUTPATIENT)
Dept: LAB | Facility: CLINIC | Age: 34
End: 2019-07-19

## 2019-07-19 DIAGNOSIS — Z71.3 NUTRITIONAL COUNSELING: ICD-10-CM

## 2019-07-19 DIAGNOSIS — K91.2 POSTOPERATIVE MALABSORPTION: ICD-10-CM

## 2019-07-19 DIAGNOSIS — C74.01 MALIGNANT NEOPLASM OF CORTEX OF RIGHT ADRENAL GLAND (H): ICD-10-CM

## 2019-07-19 DIAGNOSIS — F43.25 ADJUSTMENT DISORDER WITH MIXED DISTURBANCE OF EMOTIONS AND CONDUCT: ICD-10-CM

## 2019-07-19 DIAGNOSIS — E05.90 HYPERTHYROIDISM: ICD-10-CM

## 2019-07-19 DIAGNOSIS — Z98.84 BARIATRIC SURGERY STATUS: ICD-10-CM

## 2019-07-19 DIAGNOSIS — Z98.84 S/P GASTRIC BYPASS: ICD-10-CM

## 2019-07-19 LAB
FOLATE SERPL-MCNC: 10.4 NG/ML
PTH-INTACT SERPL-MCNC: 133 PG/ML (ref 10–86)
VIT B12 SERPL-MCNC: 509 PG/ML (ref 213–816)

## 2019-07-19 ASSESSMENT — MIFFLIN-ST. JEOR: SCORE: 1328.18

## 2019-07-22 ENCOUNTER — MYC MEDICAL ADVICE (OUTPATIENT)
Dept: ONCOLOGY | Facility: CLINIC | Age: 34
End: 2019-07-22

## 2019-07-22 DIAGNOSIS — R30.0 DYSURIA: Primary | ICD-10-CM

## 2019-07-22 DIAGNOSIS — C74.01 MALIGNANT NEOPLASM OF CORTEX OF RIGHT ADRENAL GLAND (H): ICD-10-CM

## 2019-07-22 LAB — 25(OH)D3 SERPL-MCNC: 37 NG/ML (ref 30–80)

## 2019-07-22 NOTE — TELEPHONE ENCOUNTER
Called pt to discuss symptomatic mychart. Pt state during tubing incident, she may have hit her right side hard against side of tube several times during waves. Did not obtain any bruises. Had pain for which she took tylenol once a day which was effective. Reviewed renal labs with her which were normal Results for VALERY PANDYA (MRN [id number]) as of 7/22/2019 16:41   Ref. Range 7/17/2019 12:41   Urea Nitrogen Latest Ref Range: 7 - 30 mg/dL 7   Creatinine Latest Ref Range: 0.52 - 1.04 mg/dL 0.71   GFR Estimate Latest Ref Range: >60 mL/min/1.73_m2 >90   GFR Estimate If Black Latest Ref Range: >60 mL/min/1.73_m2 >90     Stated pain was better when she saw Dr. Cintron on 7/17 so she didn't mention pain. 2 days ago she started experiencing pain at lower R flank which she thought was related to same muscular pain. Also having dysuria, retention and darker urine. Denied hematuria, odor, fevers or chills. Last UTI was 1.5 years ago. Pt is currently out of town and ok to wait until she's back in town. Scheduled to see Aura HUFFMAN on Wed, labs at 1:30 pm and Aura at 2 pm. Advised since she is out of town if she experiences any fever, chills, hematuria or worsening symptoms she should go to , pt verbalized understanding.

## 2019-07-22 NOTE — TELEPHONE ENCOUNTER
Pt. called. TSH unreliable in determining the dose of levothyroxine as mitotane causes secondary hypothyroidism. She endorses coarse tremor, no heat or cold intolerance, mild constipation, no diarrhea, more frequent, seconds duration,palpitations (not tachycardia). She is tired and lost 10 lbs in the last month.   I recommended to continue to take the same dose of levothyroxine. Advised her to schedule a clinic f/up apt in 2 months.

## 2019-07-23 ENCOUNTER — MYC MEDICAL ADVICE (OUTPATIENT)
Dept: ONCOLOGY | Facility: CLINIC | Age: 34
End: 2019-07-23

## 2019-07-23 DIAGNOSIS — C74.01 MALIGNANT NEOPLASM OF CORTEX OF RIGHT ADRENAL GLAND (H): ICD-10-CM

## 2019-07-23 DIAGNOSIS — F19.982 DRUG-INDUCED INSOMNIA (H): ICD-10-CM

## 2019-07-23 LAB
ANNOTATION COMMENT IMP: NORMAL
VIT A SERPL-MCNC: 0.77 MG/L (ref 0.3–1.2)
VIT B1 PYROPHOSHATE BLD-SCNC: 111 NMOL/L (ref 70–180)
VITAMIN A (RETINYL PALMITATE): 0.06 MG/L (ref 0–0.1)
ZINC SERPL-MCNC: 65.6 UG/DL (ref 60–120)

## 2019-07-23 NOTE — TELEPHONE ENCOUNTER
Per Aura HUFFMAN: order UA, CBC, CMP. Notes entered in lab appointment comments to draw only CBC, CMP and collect urine. Per Dr. Cintron, does not need to move up scan.

## 2019-07-24 ENCOUNTER — TELEPHONE (OUTPATIENT)
Dept: ONCOLOGY | Facility: CLINIC | Age: 34
End: 2019-07-24

## 2019-07-24 ENCOUNTER — COMMUNICATION - HEALTHEAST (OUTPATIENT)
Dept: SURGERY | Facility: CLINIC | Age: 34
End: 2019-07-24

## 2019-07-24 DIAGNOSIS — C74.01 MALIGNANT NEOPLASM OF CORTEX OF RIGHT ADRENAL GLAND (H): Primary | ICD-10-CM

## 2019-07-24 RX ORDER — TRAZODONE HYDROCHLORIDE 50 MG/1
100 TABLET, FILM COATED ORAL
Qty: 60 TABLET | Refills: 0 | COMMUNITY
Start: 2019-07-24 | End: 2020-12-15

## 2019-07-24 RX ORDER — EPINEPHRINE 1 MG/ML
0.3 INJECTION, SOLUTION, CONCENTRATE INTRAVENOUS EVERY 5 MIN PRN
Status: CANCELLED | OUTPATIENT
Start: 2019-07-24

## 2019-07-24 RX ORDER — MEPERIDINE HYDROCHLORIDE 25 MG/ML
25 INJECTION INTRAMUSCULAR; INTRAVENOUS; SUBCUTANEOUS EVERY 30 MIN PRN
Status: CANCELLED | OUTPATIENT
Start: 2019-07-24

## 2019-07-24 RX ORDER — ALBUTEROL SULFATE 0.83 MG/ML
2.5 SOLUTION RESPIRATORY (INHALATION)
Status: CANCELLED | OUTPATIENT
Start: 2019-07-24

## 2019-07-24 RX ORDER — DIPHENHYDRAMINE HYDROCHLORIDE 50 MG/ML
50 INJECTION INTRAMUSCULAR; INTRAVENOUS
Status: CANCELLED
Start: 2019-07-24

## 2019-07-24 RX ORDER — SODIUM CHLORIDE 9 MG/ML
1000 INJECTION, SOLUTION INTRAVENOUS CONTINUOUS PRN
Status: CANCELLED
Start: 2019-07-24

## 2019-07-24 RX ORDER — ALBUTEROL SULFATE 90 UG/1
1-2 AEROSOL, METERED RESPIRATORY (INHALATION)
Status: CANCELLED
Start: 2019-07-24

## 2019-07-24 RX ORDER — EPINEPHRINE 0.3 MG/.3ML
0.3 INJECTION SUBCUTANEOUS EVERY 5 MIN PRN
Status: CANCELLED | OUTPATIENT
Start: 2019-07-24

## 2019-07-24 NOTE — TELEPHONE ENCOUNTER
"Per Dr Cintron's 7/17 office visit notes; \"- Trazodone is working well but she's taking 100mg at bedtime. Will give refills PRN.\"    Calling in refill.    "

## 2019-07-25 ENCOUNTER — OFFICE VISIT - HEALTHEAST (OUTPATIENT)
Dept: FAMILY MEDICINE | Facility: CLINIC | Age: 34
End: 2019-07-25

## 2019-07-25 DIAGNOSIS — M62.830 BACK MUSCLE SPASM: ICD-10-CM

## 2019-07-25 DIAGNOSIS — Z87.442 HISTORY OF KIDNEY STONES: ICD-10-CM

## 2019-07-25 DIAGNOSIS — R10.9 FLANK PAIN: ICD-10-CM

## 2019-07-25 LAB — LAB SCANNED RESULT: NORMAL

## 2019-07-25 ASSESSMENT — MIFFLIN-ST. JEOR: SCORE: 1342.64

## 2019-07-26 ENCOUNTER — AMBULATORY - HEALTHEAST (OUTPATIENT)
Dept: LAB | Facility: CLINIC | Age: 34
End: 2019-07-26

## 2019-07-26 DIAGNOSIS — R10.9 FLANK PAIN: ICD-10-CM

## 2019-07-26 DIAGNOSIS — Z87.442 HISTORY OF KIDNEY STONES: ICD-10-CM

## 2019-07-26 LAB
ALBUMIN UR-MCNC: NEGATIVE MG/DL
APPEARANCE UR: CLEAR
BACTERIA #/AREA URNS HPF: ABNORMAL HPF
BILIRUB UR QL STRIP: NEGATIVE
COLOR UR AUTO: YELLOW
GLUCOSE UR STRIP-MCNC: NEGATIVE MG/DL
HGB UR QL STRIP: ABNORMAL
KETONES UR STRIP-MCNC: NEGATIVE MG/DL
LEUKOCYTE ESTERASE UR QL STRIP: NEGATIVE
NITRATE UR QL: NEGATIVE
PH UR STRIP: 7 [PH] (ref 5–8)
RBC #/AREA URNS AUTO: ABNORMAL HPF
SP GR UR STRIP: 1.01 (ref 1–1.03)
SQUAMOUS #/AREA URNS AUTO: ABNORMAL LPF
UROBILINOGEN UR STRIP-ACNC: ABNORMAL
WBC #/AREA URNS AUTO: ABNORMAL HPF

## 2019-07-27 LAB — BACTERIA SPEC CULT: NORMAL

## 2019-08-08 ENCOUNTER — HOSPITAL ENCOUNTER (OUTPATIENT)
Dept: PHYSICAL THERAPY | Facility: CLINIC | Age: 34
Setting detail: THERAPIES SERIES
End: 2019-08-08
Attending: INTERNAL MEDICINE
Payer: COMMERCIAL

## 2019-08-08 PROCEDURE — 97162 PT EVAL MOD COMPLEX 30 MIN: CPT | Mod: GP | Performed by: PHYSICAL THERAPIST

## 2019-08-08 PROCEDURE — 97535 SELF CARE MNGMENT TRAINING: CPT | Mod: GP | Performed by: PHYSICAL THERAPIST

## 2019-08-08 ASSESSMENT — 6 MINUTE WALK TEST (6MWT): TOTAL DISTANCE WALKED (METERS): 948

## 2019-08-08 NOTE — PROGRESS NOTES
08/08/19 1000   General Information   Type of Visit Initial OP Ortho PT Evaluation   Start of Care Date 08/08/19   Referring Physician Benson Cintron   Patient/Family Goals Statement Get back to having more energy and more exercise. Carrying the laundry up and  down, when do too much in one day. Daily NOLASCO's Global in the morning  usually.    Orders Evaluate and Treat   Orders Comment CA Rehab    Date of Order 07/17/19   Certification Required? No  (Pref One - Auth'd )   Medical Diagnosis Gen'l mm weakness, malignant neuroplasm of R Adrenal Gland.    Surgical/Medical history reviewed   (Adrenelectomy May 2018. )   Precautions/Limitations no known precautions/limitations   Special Instructions Treadmill and free weights at home. Asked to buy oximeter.    General Information Comments Has been on chemo for a year. Doing chemo currently, had radiation last October of 2018, Had adrenal gland removed May 2018.    Body Part(s)   Body Part(s) Lumbar Spine/SI   Presentation and Etiology   Pertinent history of current problem (include personal factors and/or comorbidities that impact the POC) Diagnosed 5/8/18.    Impairments A. Pain;F. Decreased strength and endurance;G. Impaired balance;N. Headaches;P. Bowel or bladder problems;Q. Dizziness   Functional Limitations perform required work activities;perform desired leisure / sports activities   Symptom Location Daily HA's in the morning, global HA. B Legs get sore easily and pain goes up to 8/10.    How/Where did it occur Other  (CA Diagnosis )   Onset date of current episode/exacerbation 07/17/19  (MD Order date. )   Chronicity Chronic   Pain rating (0-10 point scale)   (0-8/10 )   Pain quality C. Aching   Frequency of pain/symptoms C. With activity   Pain/symptoms are: The same all the time   Pain/symptoms exacerbated by C. Lifting;D. Carrying;F. Nothing;K. Home tasks;L. Work tasks   Pain/symptoms eased by C. Rest;D. Nothing;K. Other   Pain eased by comment Prescribed meds.     Progression of symptoms since onset: Unchanged   Prior Level of Function   Functional Level Prior Comment Independent w/ADL's.    Current Level of Function   Current Community Support Family/friend caregiver   Patient role/employment history G. Disabled   Living environment House/townhome   Home/community accessibility Stairs w/ railings.    Fall Risk Screen   Fall screen completed by PT   Have you fallen 2 or more times in the past year? No   Have you fallen and had an injury in the past year? No   Timed Up and Go score (seconds) Walks quickly in to dept.    Is patient a fall risk? No   Abuse Screen (yes response referral indicated)   Feels Unsafe at Home or Work/School no   Feels Threatened by Someone no   Does Anyone Try to Keep You From Having Contact with Others or Doing Things Outside Your Home? no   Physical Signs of Abuse Present no   Vitals Signs   Heart Rate 85   SpO2 99   Blood Pressure 132/98   System Outcome Measures   Outcome Measures   (FACIT 10 )   Functional Scales   Functional Scales OPTIMAL   Optimal (1=able to do without difficulty, 2=able to do with little difficulty, 3=able to do with moderate difficulty, 4=able to do with much difficulty, 5=unable to do, 9=NA) Activity 2;Activity 1;Activity 3   Activity 1 comment Daily Global HA's    Activity 2 comment Significant difficulty w/ carrying laundry up/down stairs.    Activity 3 comment Legs ache the day after activity.    Lumbar Spine/SI Objective Findings   Observation Tired, Lathargic    Integumentary Normal    Posture Good    Gait/Locomotion 6 minute walk test - 948 feet    Balance/Proprioception (Single Leg Stance) 10 Seconds B w/ EO.    Lumbar/SI Special Tests Comments  Strength: R 66, L 66 Average of 3.    Planned Therapy Interventions   Planned Therapy Interventions Comment Education, Aerobic, Strenghtening, Flexibility for CA Rehab program.    Planned Modality Interventions   Planned Modality Interventions Comments Parameters: HR  130, O2 88%.    Clinical Impression   Criteria for Skilled Therapeutic Interventions Met yes, treatment indicated   PT Diagnosis CA Related Fatigue   Influenced by the following impairments Pain, Decreased strength, balance, HA's    Functional limitations due to impairments HH duties, Mobility, Poor endurance for any activity.    Clinical Presentation Evolving/Changing   Clinical Presentation Rationale FACIT,  Strength, Hx of Malignant Adrenal CA, 6 minute walk test.    Clinical Decision Making (Complexity) Moderate complexity   Therapy Frequency 2 times/Week   Predicted Duration of Therapy Intervention (days/wks) 5 weeks, then reassess, 10 visits.    Risk & Benefits of therapy have been explained Yes   Patient, Family & other staff in agreement with plan of care Yes   Clinical Impression Comments CA Related Fatigue   Education Assessment   Preferred Learning Style Listening;Demonstration;Pictures/video   Barriers to Learning Physical;Cognitive   ORTHO GOALS   PT Ortho Eval Goals 1;2;3;4   Ortho Goal 1   Goal Identifier 1   Goal Description STG: Pt will be able to report NOLASCO's EOD or less.    Target Date 09/16/19   Ortho Goal 2   Goal Identifier 2   Goal Description STG: Pt will be able to note improved ability to do laundry.    Target Date 09/16/19   Ortho Goal 3   Goal Identifier 3   Goal Description STG: Pt will be able to note less leg pain, 3/10 or less, and soreness the day after activities.    Target Date 09/16/19   Ortho Goal 4   Goal Identifier 4   Goal Description LTG: Pt will be able to be educated about the CRF program and be able to continue on her own.    Target Date 10/07/19   Total Evaluation Time   PT Paty, Moderate Complexity Minutes (65947) 30   Liz Mei PT, [facility] (#[id number])  [facility]           [phone]  Fax          [phone]  Appt #      [phone]

## 2019-08-08 NOTE — PROGRESS NOTES
CA Ex Flow Sheet                      Malignant Neuroplasm of Adrenal Gland. Pref One     Date/Exercise           Bike   UBE           Treadmill           U/E Strengthening                     L/E Strengthening                     Stretches for Flexibility.                                                                       BP Limits: Systolic <90 or >200 / Diastolic < 65 or > 120 mm /Hg   HR Limits:  BPS.   O2 <88%    -    Glucose between 70 to 250.   WBC normal 5000-91085.  > 5000 ok for light ex progress to resistive.  < 5000 if has fever no ex unless cleared by MD  Hemoglobin:  10-12 low impact / low intesity aerobics/ activity  8-10 Monitor vitals.  Can do ROM, no aerobic activity  < 8 Red flag--discuss cont w/ MD

## 2019-08-09 ENCOUNTER — COMMUNICATION - HEALTHEAST (OUTPATIENT)
Dept: FAMILY MEDICINE | Facility: CLINIC | Age: 34
End: 2019-08-09

## 2019-08-09 DIAGNOSIS — F41.1 GENERALIZED ANXIETY DISORDER: ICD-10-CM

## 2019-08-12 ENCOUNTER — HOSPITAL ENCOUNTER (OUTPATIENT)
Dept: PHYSICAL THERAPY | Facility: CLINIC | Age: 34
Setting detail: THERAPIES SERIES
End: 2019-08-12
Attending: INTERNAL MEDICINE
Payer: COMMERCIAL

## 2019-08-12 PROCEDURE — 97110 THERAPEUTIC EXERCISES: CPT | Mod: GP | Performed by: PHYSICAL THERAPIST

## 2019-08-12 ASSESSMENT — 6 MINUTE WALK TEST (6MWT): TOTAL DISTANCE WALKED (METERS): 948

## 2019-08-14 ENCOUNTER — RECORDS - HEALTHEAST (OUTPATIENT)
Dept: ADMINISTRATIVE | Facility: OTHER | Age: 34
End: 2019-08-14

## 2019-08-14 ENCOUNTER — ONCOLOGY VISIT (OUTPATIENT)
Dept: ONCOLOGY | Facility: CLINIC | Age: 34
End: 2019-08-14
Attending: INTERNAL MEDICINE
Payer: COMMERCIAL

## 2019-08-14 ENCOUNTER — ANCILLARY PROCEDURE (OUTPATIENT)
Dept: CT IMAGING | Facility: CLINIC | Age: 34
End: 2019-08-14
Attending: INTERNAL MEDICINE
Payer: COMMERCIAL

## 2019-08-14 VITALS
TEMPERATURE: 98 F | DIASTOLIC BLOOD PRESSURE: 77 MMHG | RESPIRATION RATE: 16 BRPM | WEIGHT: 151 LBS | HEART RATE: 98 BPM | BODY MASS INDEX: 25.91 KG/M2 | SYSTOLIC BLOOD PRESSURE: 114 MMHG | OXYGEN SATURATION: 100 %

## 2019-08-14 DIAGNOSIS — C74.01 MALIGNANT NEOPLASM OF CORTEX OF RIGHT ADRENAL GLAND (H): ICD-10-CM

## 2019-08-14 DIAGNOSIS — R30.0 DYSURIA: ICD-10-CM

## 2019-08-14 DIAGNOSIS — C74.01 ADRENAL CORTICAL ADENOCARCINOMA OF RIGHT ADRENAL GLAND (H): Primary | ICD-10-CM

## 2019-08-14 LAB
ALBUMIN SERPL-MCNC: 2.9 G/DL (ref 3.4–5)
ALBUMIN UR-MCNC: NEGATIVE MG/DL
ALP SERPL-CCNC: 110 U/L (ref 40–150)
ALT SERPL W P-5'-P-CCNC: 25 U/L (ref 0–50)
ANION GAP SERPL CALCULATED.3IONS-SCNC: 5 MMOL/L (ref 3–14)
APPEARANCE UR: ABNORMAL
AST SERPL W P-5'-P-CCNC: 16 U/L (ref 0–45)
BACTERIA #/AREA URNS HPF: ABNORMAL /HPF
BASOPHILS # BLD AUTO: 0 10E9/L (ref 0–0.2)
BASOPHILS NFR BLD AUTO: 0.8 %
BILIRUB SERPL-MCNC: 0.2 MG/DL (ref 0.2–1.3)
BILIRUB UR QL STRIP: NEGATIVE
BUN SERPL-MCNC: 10 MG/DL (ref 7–30)
CALCIUM SERPL-MCNC: 7.8 MG/DL (ref 8.5–10.1)
CHLORIDE SERPL-SCNC: 106 MMOL/L (ref 94–109)
CHOLEST SERPL-MCNC: 180 MG/DL
CO2 SERPL-SCNC: 28 MMOL/L (ref 20–32)
COLOR UR AUTO: YELLOW
CREAT SERPL-MCNC: 0.72 MG/DL (ref 0.52–1.04)
DHEA-S SERPL-MCNC: <15 UG/DL (ref 35–430)
DIFFERENTIAL METHOD BLD: ABNORMAL
EOSINOPHIL # BLD AUTO: 0.1 10E9/L (ref 0–0.7)
EOSINOPHIL NFR BLD AUTO: 1.9 %
ERYTHROCYTE [DISTWIDTH] IN BLOOD BY AUTOMATED COUNT: 14.8 % (ref 10–15)
GFR SERPL CREATININE-BSD FRML MDRD: >90 ML/MIN/{1.73_M2}
GLUCOSE SERPL-MCNC: 85 MG/DL (ref 70–99)
GLUCOSE UR STRIP-MCNC: NEGATIVE MG/DL
HCT VFR BLD AUTO: 37.9 % (ref 35–47)
HDLC SERPL-MCNC: 99 MG/DL
HGB BLD-MCNC: 11.4 G/DL (ref 11.7–15.7)
HGB UR QL STRIP: ABNORMAL
IMM GRANULOCYTES # BLD: 0 10E9/L (ref 0–0.4)
IMM GRANULOCYTES NFR BLD: 0.2 %
KETONES UR STRIP-MCNC: NEGATIVE MG/DL
LDLC SERPL CALC-MCNC: 48 MG/DL
LEUKOCYTE ESTERASE UR QL STRIP: NEGATIVE
LYMPHOCYTES # BLD AUTO: 1 10E9/L (ref 0.8–5.3)
LYMPHOCYTES NFR BLD AUTO: 20 %
MCH RBC QN AUTO: 24.5 PG (ref 26.5–33)
MCHC RBC AUTO-ENTMCNC: 30.1 G/DL (ref 31.5–36.5)
MCV RBC AUTO: 82 FL (ref 78–100)
MONOCYTES # BLD AUTO: 0.5 10E9/L (ref 0–1.3)
MONOCYTES NFR BLD AUTO: 9.8 %
NEUTROPHILS # BLD AUTO: 3.2 10E9/L (ref 1.6–8.3)
NEUTROPHILS NFR BLD AUTO: 67.3 %
NITRATE UR QL: NEGATIVE
NONHDLC SERPL-MCNC: 81 MG/DL
NRBC # BLD AUTO: 0 10*3/UL
NRBC BLD AUTO-RTO: 0 /100
PH UR STRIP: 7 PH (ref 5–7)
PLATELET # BLD AUTO: 244 10E9/L (ref 150–450)
POTASSIUM SERPL-SCNC: 3.6 MMOL/L (ref 3.4–5.3)
PROT SERPL-MCNC: 6 G/DL (ref 6.8–8.8)
RBC # BLD AUTO: 4.65 10E12/L (ref 3.8–5.2)
RBC #/AREA URNS AUTO: 149 /HPF (ref 0–2)
SODIUM SERPL-SCNC: 140 MMOL/L (ref 133–144)
SOURCE: ABNORMAL
SP GR UR STRIP: 1 (ref 1–1.03)
SQUAMOUS #/AREA URNS AUTO: 6 /HPF (ref 0–1)
T4 FREE SERPL-MCNC: 0.92 NG/DL (ref 0.76–1.46)
TRIGL SERPL-MCNC: 165 MG/DL
TSH SERPL DL<=0.005 MIU/L-ACNC: 0.04 MU/L (ref 0.4–4)
UROBILINOGEN UR STRIP-MCNC: 0 MG/DL (ref 0–2)
WBC # BLD AUTO: 4.8 10E9/L (ref 4–11)
WBC #/AREA URNS AUTO: 2 /HPF (ref 0–5)

## 2019-08-14 PROCEDURE — 84999 UNLISTED CHEMISTRY PROCEDURE: CPT | Performed by: INTERNAL MEDICINE

## 2019-08-14 PROCEDURE — 82627 DEHYDROEPIANDROSTERONE: CPT | Performed by: PHYSICIAN ASSISTANT

## 2019-08-14 PROCEDURE — 99215 OFFICE O/P EST HI 40 MIN: CPT | Mod: ZP | Performed by: INTERNAL MEDICINE

## 2019-08-14 PROCEDURE — 36415 COLL VENOUS BLD VENIPUNCTURE: CPT | Performed by: PHYSICIAN ASSISTANT

## 2019-08-14 PROCEDURE — 85025 COMPLETE CBC W/AUTO DIFF WBC: CPT | Performed by: PHYSICIAN ASSISTANT

## 2019-08-14 PROCEDURE — 80053 COMPREHEN METABOLIC PANEL: CPT | Performed by: PHYSICIAN ASSISTANT

## 2019-08-14 PROCEDURE — 80061 LIPID PANEL: CPT | Performed by: PHYSICIAN ASSISTANT

## 2019-08-14 PROCEDURE — 84439 ASSAY OF FREE THYROXINE: CPT | Performed by: PHYSICIAN ASSISTANT

## 2019-08-14 PROCEDURE — 84443 ASSAY THYROID STIM HORMONE: CPT | Performed by: PHYSICIAN ASSISTANT

## 2019-08-14 PROCEDURE — G0463 HOSPITAL OUTPT CLINIC VISIT: HCPCS | Mod: ZF

## 2019-08-14 PROCEDURE — 80375 DRUG/SUBSTANCE NOS 1-3: CPT | Performed by: INTERNAL MEDICINE

## 2019-08-14 PROCEDURE — 81001 URINALYSIS AUTO W/SCOPE: CPT | Performed by: PHYSICIAN ASSISTANT

## 2019-08-14 RX ORDER — IOPAMIDOL 755 MG/ML
89 INJECTION, SOLUTION INTRAVASCULAR ONCE
Status: COMPLETED | OUTPATIENT
Start: 2019-08-14 | End: 2019-08-14

## 2019-08-14 RX ORDER — CYCLOBENZAPRINE HCL 5 MG
5-10 TABLET ORAL
COMMUNITY
Start: 2019-07-25 | End: 2020-01-22

## 2019-08-14 RX ADMIN — IOPAMIDOL 89 ML: 755 INJECTION, SOLUTION INTRAVASCULAR at 10:57

## 2019-08-14 ASSESSMENT — PAIN SCALES - GENERAL: PAINLEVEL: NO PAIN (0)

## 2019-08-14 NOTE — DISCHARGE INSTRUCTIONS

## 2019-08-14 NOTE — LETTER
"8/14/2019       RE: Suzy Rodríguez  [address]  [facility] [id number]     Dear Colleague,    Thank you for referring your patient, Suzy Rodríguez, to the [facility]. Please see a copy of my visit note below.    MEDICAL ONCOLOGY CLINIC NOTE    REFERRING PROVIDER: Warren Mansfield MD from [facility] Urologic Oncology clinic.     REASON FOR CURRENT VISIT: Follow-up while on mitotane as adjuvant therapy of adrenocortical carcinoma.      HISTORY OF PRESENT ILLNESS: Ms. Rodríguez is a 33-year-old lady with history notable for right-sided functioning adrenocortical carcinoma (diagnosed in May 2018), who is here for follow-up while on adjuvant mitotane. Her oncologic history is detailed as under.     Suzy is not doing well right now. She's noted significant, severe fatigue, slurred speech, muscle pain, insomnia, and tremors since being on the higher dose of mitotane 1500mg AM - 1500mg afternoon and 1000mg HS. Also on hydrocortisone 30mg QAM, 20 early PM. No recurrence of episode of dizziness/LOC. No history of CVA/TIA/palpitations/CP/SOA etc. Using Compazine 10mg BID for chronic, mitotane-associated nausea. No diarrhea/abd pain/bloody BM. She has 1-2 lose BMs a day and uses Imodium PRN. Using trazodone for sleep sometimes.     Last visit with Dr. Hilton at [facility] was on 2/26/19. She also follows with Dr. Veena Jaquez in our endocrinology department. Has also met with Ector Marcelino, our psychologist, and is feeling better overall, but does describe symptoms of intermittent anxiety.     No clinical evidence of disease recurrence on scans and labs.     ONCOLOGIC HISTORY:   1. 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   - CT abdomen and pelvis stone protocol without contrast 5 8018 showed \"9.2 x 8 cm heterogeneous " "right upper quadrant mass with small foci of calcification within it which is likely arising from the adrenal gland though inseparable from liver and upper pole of right kidney. It is incompletely evaluated on this noncontrast study. 3 x 2.6 cm mass right lobe of liver on image #85 also incompletely evaluated. 6 x 4 x 4 mm upper third left ureteral obstructing stone with mild to moderate secondary hydronephrosis. Collection of stones at lower pole left kidney extending over an area of approximately 6 x 7 x 4 mm. Additional nonobstructing 1 mm stone upper pole left kidney. 2 mm nonobstructing stone noted upper pole right kidney with no hydronephrosis on the right. Postop change consistent with gastric bypass. Noncontrast images of spleen, left adrenal gland, gallbladder, and pancreas unremarkable. No aneurysm. No adenopathy.\"  - Seen by Dr. Delvalle at [facility] Urology clinic. Referred to Dr. Alvino Patel and then Dr. Warren Mansfield.  - Endocrinology team directed workup showed high DHEAS level of 740 pre-surgery.   - Right open adrenalectomy and hepatic mobilization performed by Dr. Warren Mansfield on 6/25/2018. Pathology showed adrenocortical carcinoma measuring 11.3 cm, weighing 413 g with extension into or through the adrenal capsule negative lymphovascular invasion, tumor necrosis present, margins involved by tumor, no regional lymph nodes identified, pathologic stage T2 NX.  - DHEAS normalized post-surgery.   - Adjuvant Mitotane started on 7/24/18. Dose increased to 2000mg TID around 10/23/18 due to persistently low troughs. Held 1/16/19 for two weeks and resumed 1/30 at 1000 mg po BID due to very poor tolerance of higher doses. Highest mitotane level was 10.2 on 2/11/19. Was taking 1500 AM-1500 afternoon-1000mg PM June-Aug 2019, but unable to tolerate. Dose reduced to 1000mg TID as of 08/14/19.  - Saw radiation oncology at [facility], radiation oncology at [facility], as " "well as endocrine oncology (Dr. Huertas) at [facility].   - S/p adjuvant RT by Dr. Monsivais - 5040 cGy over 30 fr (8/24/18-10/6/18).  - 2/11/19: CT C/A/P and MRI brain with contrast - no evidence of disease recurrence.  - 05/07/19: CT C/A/P with contrast - \"1. No evidence for locally recurrent disease. 2. No evidence for metastatic disease in the chest, abdomen, or pelvis. 3. A 2.5 x 2.2 cm peripherally enhancing mass in hepatic segment 5, unchanged enhancing likely hemangioma given the peripheral discontinuous enhancement on previous examination. No other suspicious liver lesions.\"  - 08/14/19: CT C/A/P with contrast - AFUA.    REVIEW OF SYSTEMS: 14 point ROS negative other than the symptoms noted above in the HPI.    PAST MEDICAL AND SURGICAL HISTORY: Reviewed today.  1. Right-sided adrenocortical carcinoma.  2. MHTFR mutation with h/o mutiple miscarriages.  3. Ovarian cysts diagnosed in 2011.  4. H/o gastric bypass in 2016 for obesity.    SOCIAL HISTORY:   Social History     Tobacco Use     Smoking status: Never Smoker     Smokeless tobacco: Never Used   Substance Use Topics     Alcohol use: Yes     Comment: occ.     Drug use: No     FAMILY HISTORY:   No clustering of malignancies.    ALLERGIES:   Allergies   Allergen Reactions     Bee Venom Swelling     Ibuprofen Hives, Swelling and Other (See Comments)     CURRENT MEDICATIONS:   Current Outpatient Medications:      acetaminophen (TYLENOL) 325 MG tablet, Take 2 tablets (650 mg) by mouth every 4 hours as needed for other (multimodal surgical pain management along with NSAIDS and opioid medication as indicated based on pain control and physical function.), Disp: 150 tablet, Rfl: 0     buPROPion (WELLBUTRIN) 100 MG tablet, TAKE 1 TABLET (100 MG TOTAL) BY MOUTH 2 (TWO) TIMES A DAY., Disp: , Rfl: 3     calcium carbonate antacid 1000 MG CHEW, Take 1 tablet by mouth every morning , Disp: , Rfl:      cholecalciferol (VITAMIN D-1000 MAX ST) 1000 units TABS, Take " 2,000 Units by mouth every morning , Disp: , Rfl:      cyclobenzaprine (FLEXERIL) 5 MG tablet, Take 5-10 mg by mouth, Disp: , Rfl:      diazepam (VALIUM) 5 MG tablet, Take 1 tablet (5 mg) by mouth once as needed for anxiety (MRI claustrophobia management), Disp: 2 tablet, Rfl: 3     ferrous sulfate (IRON) 325 (65 Fe) MG tablet, Take 325 mg by mouth daily, Disp: , Rfl:      fludrocortisone (FLORINEF) 0.1 MG tablet, Take 0.5 tablets (0.05 mg) by mouth daily, Disp: 45 tablet, Rfl: 3     FLUoxetine (PROZAC) 40 MG capsule, Take 40 mg by mouth daily, Disp: , Rfl:      gabapentin (NEURONTIN) 300 MG capsule, Take 300 mg by mouth 3 times daily, Disp: , Rfl:      hydrocortisone (CORTEF) 10 MG tablet, Take 3 pills (=30 mg) in AM and 2 pills (=20 mg) at 2 pm daily, additional as directed., Disp: 180 tablet, Rfl: 11     levothyroxine (SYNTHROID/LEVOTHROID) 125 MCG tablet, Take 1 tablet (125 mcg) by mouth daily, Disp: 90 tablet, Rfl: 3     mitotane (LYSODREN) 500 MG tablet CHEMO, 1500mg in morning, 1500mg in early afternoon and 500mg in early evening for a total daily dose of 3500mg., Disp: 210 tablet, Rfl: 0     Multiple Vitamins-Calcium (ONE-A-DAY WOMENS PO), Take 2 tablets by mouth every morning , Disp: , Rfl:      pantoprazole (PROTONIX) 40 MG EC tablet, Take 1 tablet (40 mg) by mouth daily, Disp: 30 tablet, Rfl: 0     prochlorperazine (COMPAZINE) 5 MG tablet, Take 1 tablet (5 mg) by mouth every 8 hours as needed for nausea or vomiting, Disp: 90 tablet, Rfl: 3     traZODone (DESYREL) 50 MG tablet, Take 2 tablets (100 mg) by mouth nightly as needed for sleep, Disp: 60 tablet, Rfl: 0     UNABLE TO FIND, medical cannabis (Patient's own supply. Not a prescription), Disp: , Rfl:      EPINEPHrine (EPIPEN/ADRENACLICK/OR ANY BX GENERIC EQUIV) 0.3 MG/0.3ML injection 2-pack, As directed for bee stings, Disp: , Rfl:      hydrocortisone sodium succinate PF (SOLU-CORTEF) 100 MG injection, Inject 2 mLs (100 mg) into the muscle once for 1  dose Use in emergency situations as discussed in clinic. (Patient not taking: Reported on 8/14/2019), Disp: 2 mL, Rfl: 1  No current facility-administered medications for this visit.     PHYSICAL EXAMINATION:  Vital signs: /77   Pulse 98   Temp 98  F (36.7  C) (Oral)   Resp 16   Wt 68.5 kg (151 lb)   LMP 08/10/2019   SpO2 100%   Breastfeeding? No   BMI 25.91 kg/m     ECOG performance status of 2.   GENERAL: Young lady, lying on exam table. Appears tremulous and fatigued, but in no acute distress.  HEENT: No icterus, no pallor. MMM, PERRL, OP clear.   NECK: Supple, no JVD/LAD.  LUNGS: CTAB, normal WOB on RA.  CARDIOVASCULAR: Regular rate and rhythm, no murmurs.   ABDOMEN: Well-healing posterior right sided abdominal incision without concerning findings. Soft, NT, ND, no masses appreciated, normal BS.   EXTREMITIES: No cyanosis, no clubbing, no edema.   NEUROLOGIC: Alert and fully oriented. Hands are tremulous.   PSYCH: Affect flat, mood slightly anxious/depressed.    LABORATORY DATA:   Lab Test 08/14/19  1050 07/17/19  1241 06/18/19  1230   WBC 4.8 5.5 4.1   RBC 4.65 4.86 4.37   HGB 11.4* 12.0 11.0*   HCT 37.9 39.4 36.1   MCV 82 81 83   MCH 24.5* 24.7* 25.2*   MCHC 30.1* 30.5* 30.5*   RDW 14.8 14.9 15.2*    307 250   NEUTROPHIL 67.3 84.0 73.8    137 137   POTASSIUM 3.6 3.5 3.2*   CHLORIDE 106 104 105   CO2 28 27 21   ANIONGAP 5 6 10   GLC 85 82 160*   BUN 10 7 6*   CR 0.72 0.71 0.55   SHASHA 7.8* 8.0* 7.6*   PROTTOTAL 6.0* 6.7* 5.9*   ALBUMIN 2.9* 3.4 2.9*   BILITOTAL 0.2 0.3 0.2   ALKPHOS 110 133 107   AST 16 24 20   ALT 25 28 21     Lab Test 08/14/19  1050 07/17/19  1241 06/18/19  1230 05/08/19  1013 04/10/19  1030   TSH 0.04* 0.04* 0.09* 0.27* 0.71   T4 0.92 1.14 1.00 0.98 1.06     Lab Test 08/14/19  1050 07/17/19  1241   CHOL 180 183   HDL 99 117   LDL 48 44   TRIG 165* 110   DHEAS was 740 on 6/5/18, and has been <15 on 7/12/18, 8/20/18, 9/19/18, 10/23/18, 11/27/18, 1/9/19, 5/8/19,  "6/18/19, 7/17/19. Pending from today.  Mitotane level (target 14-20): 1.8ng/dl on 8/20/18, 3.5 on 10/25/18, 6.2 on 12/4/18, 8.1 on 1/9/19 and 10.2 on 2/11/19, 14.2 on 6/18/19. Pending from today.    IMAGING STUDIES:  As above.    ASSESSMENT AND PLAN: A 33-year-old lady with right-sided functioning adrenocortical carcinoma, who is here for follow-up while on adjuvant mitotane.     Adrenocortical carcinoma:  - Started on adjuvant mitotane in July 2018 based on her presentation and tumor characteristics including invasion of the adrenal capsule, high mitotic rate, possibly positive margins, and high Ki67, which could result in a rate of recurrence as high as 40%.    - Restaging CT C/A/P from today continues to show no evidence of disease recurrence ([facility] Online message sent). No evidence of clinical or biochemical recurrence either.   - Despite multiple supportive care interventions, patient has NOT been able to tolerate higher doses (above 3gm/day) of mitotane. The trial of dose increase to [id number] has failed as well. Now with significant side effects as above.  - Patient wants to drop dose to the best tolerated dose of 1000mg TID. I think this may be the most reasonable option and perhaps the only one that'll allow completion of 5 years of therapy.   - Change mitotane to 1000mg TID starting today.  - We've had numerous conversations regarding goals of therapy and that mitotane doesn't work on an \"all or none\" basis with no activity below therapeutic level. In her case, it is critical to not aim for a number but to actually aim for optimizing tolerance to therapy to allow continuation for another 4 years. Suzy and her  are in agreement.   - Monthly Mitotane level, CBC, comprehensive panel, TSH, FT4, cholesterol panel, 24-hour Urine Free Cortisol, and DHEAS.   - Plan to continue adjuvant Mitotane for up to 5 yrs if tolerated.  - Next restaging CT C/A/P in 3-4 months.    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   - We have stopped Ativan and I would encourage other medical providers to not give any further refills.  - Discussed health coping strategies including cognitive behavioral interventions. Seen by our psychology team as well.     Insomnia:  - Trazodone is working well but she's taking 100mg at bedtime. Will give refills PRN.   - EKG with QTc 488 ms in April - recheck at next clinic visit.    Endocrinopathies:  - Mgmt per endocrine team at the . Follow-up planned in the next couple months.    Multifocal macroovarian cysts:  - These can happen with mitotane and are likely not cancerous. Offered Gyn referral - awaiting patient response.    Intussusception:  - The intestinal finding is non-specific and may represent variation of normal anatomy.   - Offered GI referral - awaiting patient response. Also advised to seek immediate medical attn if she has symptoms like abdominal pain/diarrhea etc.     Return to see me in 1 month with labs. Patient and family given opportunity to ask questions, which were answered to their satisfaction. They're in complete agreement as planned.    BILLIN.     Benson Cintron M.D.  . Professor of Medicine  Genitourinary Oncology  Division of Hematology, Oncology & Transplantation  [facility]

## 2019-08-14 NOTE — NURSING NOTE
"Oncology Rooming Note    August 14, 2019 1:34 PM   Suzy Rodríguez is a 33 year old female who presents for:    Chief Complaint   Patient presents with     Oncology Clinic Visit     Return Adrenal Cortical Ca     Initial Vitals: /77   Pulse 98   Temp 98  F (36.7  C) (Oral)   Resp 16   Wt 68.5 kg (151 lb)   LMP 08/10/2019   SpO2 100%   Breastfeeding? No   BMI 25.91 kg/m   Estimated body mass index is 25.91 kg/m  as calculated from the following:    Height as of 7/17/19: 1.626 m (5' 4.02\").    Weight as of this encounter: 68.5 kg (151 lb). Body surface area is 1.76 meters squared.  No Pain (0) Comment: Data Unavailable   Patient's last menstrual period was 08/10/2019.  Allergies reviewed: Yes  Medications reviewed: Yes    Medications: Medication refills not needed today.  Pharmacy name entered into makexyz: [facility] [place], [place] - [address]    Clinical concerns: CT scan and lab results       Sulma Altamirano CMA              "

## 2019-08-14 NOTE — PROGRESS NOTES
"MEDICAL ONCOLOGY CLINIC NOTE    REFERRING PROVIDER: Warren Mansfield MD from [facility] Urologic Oncology clinic.     REASON FOR CURRENT VISIT: Follow-up while on mitotane as adjuvant therapy of adrenocortical carcinoma.      HISTORY OF PRESENT ILLNESS: Ms. Rodríguez is a 33-year-old lady with history notable for right-sided functioning adrenocortical carcinoma (diagnosed in May 2018), who is here for follow-up while on adjuvant mitotane. Her oncologic history is detailed as under.     Suzy is not doing well right now. She's noted significant, severe fatigue, slurred speech, muscle pain, insomnia, and tremors since being on the higher dose of mitotane 1500mg AM - 1500mg afternoon and 1000mg HS. Also on hydrocortisone 30mg QAM, 20 early PM. No recurrence of episode of dizziness/LOC. No history of CVA/TIA/palpitations/CP/SOA etc. Using Compazine 10mg BID for chronic, mitotane-associated nausea. No diarrhea/abd pain/bloody BM. She has 1-2 lose BMs a day and uses Imodium PRN. Using trazodone for sleep sometimes.     Last visit with Dr. Hilton at [facility] was on 2/26/19. She also follows with Dr. Veena Jaquez in our endocrinology department. Has also met with Ector Marcelino, our psychologist, and is feeling better overall, but does describe symptoms of intermittent anxiety.     No clinical evidence of disease recurrence on scans and labs.     ONCOLOGIC HISTORY:   1. 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   - CT abdomen and pelvis stone protocol without contrast 5 8018 showed \"9.2 x 8 cm heterogeneous right upper quadrant mass with small foci of calcification within it which is likely arising from the adrenal gland though inseparable from liver and upper pole of right kidney. It is incompletely evaluated on this noncontrast study. 3 x 2.6 " "cm mass right lobe of liver on image #85 also incompletely evaluated. 6 x 4 x 4 mm upper third left ureteral obstructing stone with mild to moderate secondary hydronephrosis. Collection of stones at lower pole left kidney extending over an area of approximately 6 x 7 x 4 mm. Additional nonobstructing 1 mm stone upper pole left kidney. 2 mm nonobstructing stone noted upper pole right kidney with no hydronephrosis on the right. Postop change consistent with gastric bypass. Noncontrast images of spleen, left adrenal gland, gallbladder, and pancreas unremarkable. No aneurysm. No adenopathy.\"  - Seen by Dr. Delvalle at [facility] Urology clinic. Referred to Dr. Alvino Patel and then Dr. Warren Mansfield.  - Endocrinology team directed workup showed high DHEAS level of 740 pre-surgery.   - Right open adrenalectomy and hepatic mobilization performed by Dr. Warren Mansfield on 6/25/2018. Pathology showed adrenocortical carcinoma measuring 11.3 cm, weighing 413 g with extension into or through the adrenal capsule negative lymphovascular invasion, tumor necrosis present, margins involved by tumor, no regional lymph nodes identified, pathologic stage T2 NX.  - DHEAS normalized post-surgery.   - Adjuvant Mitotane started on 7/24/18. Dose increased to 2000mg TID around 10/23/18 due to persistently low troughs. Held 1/16/19 for two weeks and resumed 1/30 at 1000 mg po BID due to very poor tolerance of higher doses. Highest mitotane level was 10.2 on 2/11/19. Was taking 1500 AM-1500 afternoon-1000mg PM June-Aug 2019, but unable to tolerate. Dose reduced to 1000mg TID as of 08/14/19.  - Saw radiation oncology at [facility], radiation oncology at [facility], as well as endocrine oncology (Dr. Huertas) at [facility].   - S/p adjuvant RT by Dr. Monsivais - 5040 cGy over 30 fr (8/24/18-10/6/18).  - 2/11/19: CT C/A/P and MRI brain with contrast - no evidence of disease recurrence.  - 05/07/19: CT " "C/A/P with contrast - \"1. No evidence for locally recurrent disease. 2. No evidence for metastatic disease in the chest, abdomen, or pelvis. 3. A 2.5 x 2.2 cm peripherally enhancing mass in hepatic segment 5, unchanged enhancing likely hemangioma given the peripheral discontinuous enhancement on previous examination. No other suspicious liver lesions.\"  - 08/14/19: CT C/A/P with contrast - AFUA.    REVIEW OF SYSTEMS: 14 point ROS negative other than the symptoms noted above in the HPI.    PAST MEDICAL AND SURGICAL HISTORY: Reviewed today.  1. Right-sided adrenocortical carcinoma.  2. MHTFR mutation with h/o mutiple miscarriages.  3. Ovarian cysts diagnosed in 2011.  4. H/o gastric bypass in 2016 for obesity.    SOCIAL HISTORY:   Social History     Tobacco Use     Smoking status: Never Smoker     Smokeless tobacco: Never Used   Substance Use Topics     Alcohol use: Yes     Comment: occ.     Drug use: No     FAMILY HISTORY:   No clustering of malignancies.    ALLERGIES:   Allergies   Allergen Reactions     Bee Venom Swelling     Ibuprofen Hives, Swelling and Other (See Comments)     CURRENT MEDICATIONS:   Current Outpatient Medications:      acetaminophen (TYLENOL) 325 MG tablet, Take 2 tablets (650 mg) by mouth every 4 hours as needed for other (multimodal surgical pain management along with NSAIDS and opioid medication as indicated based on pain control and physical function.), Disp: 150 tablet, Rfl: 0     buPROPion (WELLBUTRIN) 100 MG tablet, TAKE 1 TABLET (100 MG TOTAL) BY MOUTH 2 (TWO) TIMES A DAY., Disp: , Rfl: 3     calcium carbonate antacid 1000 MG CHEW, Take 1 tablet by mouth every morning , Disp: , Rfl:      cholecalciferol (VITAMIN D-1000 MAX ST) 1000 units TABS, Take 2,000 Units by mouth every morning , Disp: , Rfl:      cyclobenzaprine (FLEXERIL) 5 MG tablet, Take 5-10 mg by mouth, Disp: , Rfl:      diazepam (VALIUM) 5 MG tablet, Take 1 tablet (5 mg) by mouth once as needed for anxiety (MRI claustrophobia " management), Disp: 2 tablet, Rfl: 3     ferrous sulfate (IRON) 325 (65 Fe) MG tablet, Take 325 mg by mouth daily, Disp: , Rfl:      fludrocortisone (FLORINEF) 0.1 MG tablet, Take 0.5 tablets (0.05 mg) by mouth daily, Disp: 45 tablet, Rfl: 3     FLUoxetine (PROZAC) 40 MG capsule, Take 40 mg by mouth daily, Disp: , Rfl:      gabapentin (NEURONTIN) 300 MG capsule, Take 300 mg by mouth 3 times daily, Disp: , Rfl:      hydrocortisone (CORTEF) 10 MG tablet, Take 3 pills (=30 mg) in AM and 2 pills (=20 mg) at 2 pm daily, additional as directed., Disp: 180 tablet, Rfl: 11     levothyroxine (SYNTHROID/LEVOTHROID) 125 MCG tablet, Take 1 tablet (125 mcg) by mouth daily, Disp: 90 tablet, Rfl: 3     mitotane (LYSODREN) 500 MG tablet CHEMO, 1500mg in morning, 1500mg in early afternoon and 500mg in early evening for a total daily dose of 3500mg., Disp: 210 tablet, Rfl: 0     Multiple Vitamins-Calcium (ONE-A-DAY WOMENS PO), Take 2 tablets by mouth every morning , Disp: , Rfl:      pantoprazole (PROTONIX) 40 MG EC tablet, Take 1 tablet (40 mg) by mouth daily, Disp: 30 tablet, Rfl: 0     prochlorperazine (COMPAZINE) 5 MG tablet, Take 1 tablet (5 mg) by mouth every 8 hours as needed for nausea or vomiting, Disp: 90 tablet, Rfl: 3     traZODone (DESYREL) 50 MG tablet, Take 2 tablets (100 mg) by mouth nightly as needed for sleep, Disp: 60 tablet, Rfl: 0     UNABLE TO FIND, medical cannabis (Patient's own supply. Not a prescription), Disp: , Rfl:      EPINEPHrine (EPIPEN/ADRENACLICK/OR ANY BX GENERIC EQUIV) 0.3 MG/0.3ML injection 2-pack, As directed for bee stings, Disp: , Rfl:      hydrocortisone sodium succinate PF (SOLU-CORTEF) 100 MG injection, Inject 2 mLs (100 mg) into the muscle once for 1 dose Use in emergency situations as discussed in clinic. (Patient not taking: Reported on 8/14/2019), Disp: 2 mL, Rfl: 1  No current facility-administered medications for this visit.     PHYSICAL EXAMINATION:  Vital signs: /77   Pulse 98    Temp 98  F (36.7  C) (Oral)   Resp 16   Wt 68.5 kg (151 lb)   LMP 08/10/2019   SpO2 100%   Breastfeeding? No   BMI 25.91 kg/m    ECOG performance status of 2.   GENERAL: Young lady, lying on exam table. Appears tremulous and fatigued, but in no acute distress.  HEENT: No icterus, no pallor. MMM, PERRL, OP clear.   NECK: Supple, no JVD/LAD.  LUNGS: CTAB, normal WOB on RA.  CARDIOVASCULAR: Regular rate and rhythm, no murmurs.   ABDOMEN: Well-healing posterior right sided abdominal incision without concerning findings. Soft, NT, ND, no masses appreciated, normal BS.   EXTREMITIES: No cyanosis, no clubbing, no edema.   NEUROLOGIC: Alert and fully oriented. Hands are tremulous.   PSYCH: Affect flat, mood slightly anxious/depressed.    LABORATORY DATA:   Lab Test 08/14/19  1050 07/17/19  1241 06/18/19  1230   WBC 4.8 5.5 4.1   RBC 4.65 4.86 4.37   HGB 11.4* 12.0 11.0*   HCT 37.9 39.4 36.1   MCV 82 81 83   MCH 24.5* 24.7* 25.2*   MCHC 30.1* 30.5* 30.5*   RDW 14.8 14.9 15.2*    307 250   NEUTROPHIL 67.3 84.0 73.8    137 137   POTASSIUM 3.6 3.5 3.2*   CHLORIDE 106 104 105   CO2 28 27 21   ANIONGAP 5 6 10   GLC 85 82 160*   BUN 10 7 6*   CR 0.72 0.71 0.55   SHASHA 7.8* 8.0* 7.6*   PROTTOTAL 6.0* 6.7* 5.9*   ALBUMIN 2.9* 3.4 2.9*   BILITOTAL 0.2 0.3 0.2   ALKPHOS 110 133 107   AST 16 24 20   ALT 25 28 21     Lab Test 08/14/19  1050 07/17/19  1241 06/18/19  1230 05/08/19  1013 04/10/19  1030   TSH 0.04* 0.04* 0.09* 0.27* 0.71   T4 0.92 1.14 1.00 0.98 1.06     Lab Test 08/14/19  1050 07/17/19  1241   CHOL 180 183   HDL 99 117   LDL 48 44   TRIG 165* 110   DHEAS was 740 on 6/5/18, and has been <15 on 7/12/18, 8/20/18, 9/19/18, 10/23/18, 11/27/18, 1/9/19, 5/8/19, 6/18/19, 7/17/19. Pending from today.  Mitotane level (target 14-20): 1.8ng/dl on 8/20/18, 3.5 on 10/25/18, 6.2 on 12/4/18, 8.1 on 1/9/19 and 10.2 on 2/11/19, 14.2 on 6/18/19. Pending from today.    IMAGING STUDIES:  As above.    ASSESSMENT AND PLAN: A  "33-year-old lady with right-sided functioning adrenocortical carcinoma, who is here for follow-up while on adjuvant mitotane.     Adrenocortical carcinoma:  - Started on adjuvant mitotane in July 2018 based on her presentation and tumor characteristics including invasion of the adrenal capsule, high mitotic rate, possibly positive margins, and high Ki67, which could result in a rate of recurrence as high as 40%.    - Restaging CT C/A/P from today continues to show no evidence of disease recurrence (Filmmortal message sent). No evidence of clinical or biochemical recurrence either.   - Despite multiple supportive care interventions, patient has NOT been able to tolerate higher doses (above 3gm/day) of mitotane. The trial of dose increase to [id number] has failed as well. Now with significant side effects as above.  - Patient wants to drop dose to the best tolerated dose of 1000mg TID. I think this may be the most reasonable option and perhaps the only one that'll allow completion of 5 years of therapy.   - Change mitotane to 1000mg TID starting today.  - We've had numerous conversations regarding goals of therapy and that mitotane doesn't work on an \"all or none\" basis with no activity below therapeutic level. In her case, it is critical to not aim for a number but to actually aim for optimizing tolerance to therapy to allow continuation for another 4 years. Suzy and her  are in agreement.   - Monthly Mitotane level, CBC, comprehensive panel, TSH, FT4, cholesterol panel, 24-hour Urine Free Cortisol, and DHEAS.   - Plan to continue adjuvant Mitotane for up to 5 yrs if tolerated.  - Next restaging CT C/A/P in 3-4 months.    Concern for ativan use disorder:  - I counseled her of the addictive potential of Ativan and she did agree that she has felt increasingly desiring this medication.   - We have stopped Ativan and I would encourage other medical providers to not give any further refills.  - Discussed " health coping strategies including cognitive behavioral interventions. Seen by our psychology team as well.     Insomnia:  - Trazodone is working well but she's taking 100mg at bedtime. Will give refills PRN.   - EKG with QTc 488 ms in April - recheck at next clinic visit.    Endocrinopathies:  - Mgmt per endocrine team at the . Follow-up planned in the next couple months.    Multifocal macroovarian cysts:  - These can happen with mitotane and are likely not cancerous. Offered Gyn referral - awaiting patient response.    Intussusception:  - The intestinal finding is non-specific and may represent variation of normal anatomy.   - Offered GI referral - awaiting patient response. Also advised to seek immediate medical attn if she has symptoms like abdominal pain/diarrhea etc.     Return to see me in 1 month with labs. Patient and family given opportunity to ask questions, which were answered to their satisfaction. They're in complete agreement as planned.    BILLIN.     Benson Cintron M.D.  . Professor of Medicine  Genitourinary Oncology  Division of Hematology, Oncology & Transplantation  [facility]

## 2019-08-15 ENCOUNTER — COMMUNICATION - HEALTHEAST (OUTPATIENT)
Dept: SURGERY | Facility: CLINIC | Age: 34
End: 2019-08-15

## 2019-08-15 LAB — MISCELLANEOUS TEST: NORMAL

## 2019-08-16 ENCOUNTER — HOSPITAL ENCOUNTER (OUTPATIENT)
Dept: PHYSICAL THERAPY | Facility: CLINIC | Age: 34
Setting detail: THERAPIES SERIES
End: 2019-08-16
Attending: INTERNAL MEDICINE
Payer: COMMERCIAL

## 2019-08-16 ENCOUNTER — COMMUNICATION - HEALTHEAST (OUTPATIENT)
Dept: SURGERY | Facility: CLINIC | Age: 34
End: 2019-08-16

## 2019-08-16 PROCEDURE — 97110 THERAPEUTIC EXERCISES: CPT | Mod: GP | Performed by: PHYSICAL THERAPIST

## 2019-08-16 ASSESSMENT — 6 MINUTE WALK TEST (6MWT): TOTAL DISTANCE WALKED (METERS): 948

## 2019-08-20 ENCOUNTER — HOSPITAL ENCOUNTER (OUTPATIENT)
Dept: PHYSICAL THERAPY | Facility: CLINIC | Age: 34
Setting detail: THERAPIES SERIES
End: 2019-08-20
Attending: INTERNAL MEDICINE
Payer: COMMERCIAL

## 2019-08-20 LAB — LAB SCANNED RESULT: NORMAL

## 2019-08-20 PROCEDURE — 97110 THERAPEUTIC EXERCISES: CPT | Mod: GP | Performed by: PHYSICAL THERAPIST

## 2019-08-20 NOTE — PROGRESS NOTES
" CA Ex Flow Sheet    Date/Exercise  8/20/19      Bike   UBE  Scifit: seat 7 LE L2 X 7 min (O2 after 98%, )  Scifit : seat 6 elevated UE L 1.5 X 7 min      Treadmill        U/E Strengthening        Shoulder flex standing 1# X 15 B       Biceps  4# X 15 B                                   L/E Strengthening        Standing march w/ 1 hand support X 15 B      Standing hip abd w/ 2 hand support X 10 B        Seated Knee ext 2# X 15 B                           Stretches for Flexibility.        Seated Hamstrings 30\" B                                          BP Limits: Systolic <90 or >200 / Diastolic < 65 or > 120 mm /Hg   HR Limits: 50- BPS.   O2 <88%    -    Glucose between 70 to 250.   WBC normal 5000-92692.  > 5000 ok for light ex progress to resistive.  < 5000 if has fever no ex unless cleared by MD  Hemoglobin:  10-12 low impact / low intesity aerobics/ activity  8-10 Monitor vitals.  Can do ROM, no aerobic activity  < 8 Red flag--discuss cont w/ MD  Platelets: no theraband if platelets variable.  Norm: 130,000 to 400,000  > 22512 resistive ex ok, light wts  30,000-50,000 walking, bike. No prolonged stretching  20,000 -30,000 AROM only, walking as tolerated  < 20,0000 AAROM, AROM (no reistive ex or antigravity)      "

## 2019-08-26 ENCOUNTER — HOSPITAL ENCOUNTER (OUTPATIENT)
Dept: PHYSICAL THERAPY | Facility: CLINIC | Age: 34
Setting detail: THERAPIES SERIES
End: 2019-08-26
Attending: INTERNAL MEDICINE
Payer: COMMERCIAL

## 2019-08-26 DIAGNOSIS — C74.01 MALIGNANT NEOPLASM OF CORTEX OF RIGHT ADRENAL GLAND (H): Primary | ICD-10-CM

## 2019-08-26 PROCEDURE — 97110 THERAPEUTIC EXERCISES: CPT | Mod: GP | Performed by: PHYSICAL THERAPIST

## 2019-08-26 RX ORDER — EPINEPHRINE 0.3 MG/.3ML
0.3 INJECTION SUBCUTANEOUS EVERY 5 MIN PRN
Status: CANCELLED | OUTPATIENT
Start: 2019-08-26

## 2019-08-26 RX ORDER — DIPHENHYDRAMINE HYDROCHLORIDE 50 MG/ML
50 INJECTION INTRAMUSCULAR; INTRAVENOUS
Status: CANCELLED
Start: 2019-08-26

## 2019-08-26 RX ORDER — ALBUTEROL SULFATE 90 UG/1
1-2 AEROSOL, METERED RESPIRATORY (INHALATION)
Status: CANCELLED
Start: 2019-08-26

## 2019-08-26 RX ORDER — EPINEPHRINE 1 MG/ML
0.3 INJECTION, SOLUTION, CONCENTRATE INTRAVENOUS EVERY 5 MIN PRN
Status: CANCELLED | OUTPATIENT
Start: 2019-08-26

## 2019-08-26 RX ORDER — MEPERIDINE HYDROCHLORIDE 25 MG/ML
25 INJECTION INTRAMUSCULAR; INTRAVENOUS; SUBCUTANEOUS EVERY 30 MIN PRN
Status: CANCELLED | OUTPATIENT
Start: 2019-08-26

## 2019-08-26 RX ORDER — ALBUTEROL SULFATE 0.83 MG/ML
2.5 SOLUTION RESPIRATORY (INHALATION)
Status: CANCELLED | OUTPATIENT
Start: 2019-08-26

## 2019-08-26 RX ORDER — SODIUM CHLORIDE 9 MG/ML
1000 INJECTION, SOLUTION INTRAVENOUS CONTINUOUS PRN
Status: CANCELLED
Start: 2019-08-26

## 2019-08-26 ASSESSMENT — 6 MINUTE WALK TEST (6MWT): TOTAL DISTANCE WALKED (METERS): 948

## 2019-08-27 ENCOUNTER — OFFICE VISIT (OUTPATIENT)
Dept: RADIATION THERAPY | Facility: OUTPATIENT CENTER | Age: 34
End: 2019-08-27
Payer: COMMERCIAL

## 2019-08-27 ENCOUNTER — RECORDS - HEALTHEAST (OUTPATIENT)
Dept: ADMINISTRATIVE | Facility: OTHER | Age: 34
End: 2019-08-27

## 2019-08-27 VITALS
OXYGEN SATURATION: 100 % | SYSTOLIC BLOOD PRESSURE: 94 MMHG | RESPIRATION RATE: 18 BRPM | HEART RATE: 88 BPM | BODY MASS INDEX: 25.6 KG/M2 | DIASTOLIC BLOOD PRESSURE: 63 MMHG | WEIGHT: 149.2 LBS

## 2019-08-27 DIAGNOSIS — R19.7 DIARRHEA, UNSPECIFIED TYPE: Primary | ICD-10-CM

## 2019-08-27 RX ORDER — DIPHENOXYLATE HCL/ATROPINE 2.5-.025MG
1 TABLET ORAL 3 TIMES DAILY PRN
Qty: 90 TABLET | Refills: 3 | Status: SHIPPED | OUTPATIENT
Start: 2019-08-27 | End: 2020-01-22

## 2019-08-27 NOTE — NURSING NOTE
FOLLOW-UP VISIT    Patient Name: Suzy Rodríguez      : 1985     Age: 33 year old        ______________________________________________________________________________     Chief Complaint   Patient presents with     Radiation Therapy     follow up     BP 94/63   Pulse 88   Resp 18   Wt 67.7 kg (149 lb 3.2 oz)   LMP 08/10/2019   SpO2 100%   BMI 25.60 kg/m       Date Radiation Completed: 10/8/18    Pain  Current history of pain associated with this visit:   Intensity: Patient is unable to quantify.  Current: dull  Location: abd/pelvis  Treatment:     Meds  Current Med List Reviewed: Yes  Medication Note: on antinausea medications, imodium for diarrhea, on medical marijuana    Imaging  CT: CAP 2019 and 2019   - EPIC    On Chemo?: Yes  Nausea:nausea, abdominal pain and diarrhea  Bowel: diarrhea # not noted times per day  Skin: Warm  Dry  Intact  Energy Level: low, started Cancer Rehab/PT     Other Appointments:     Date  Oncologist: Dr. Benson Cintron Appointment Date:  10/2/19   Surgeon:janes Appointment Date:     Other Notes:

## 2019-08-27 NOTE — PROGRESS NOTES
Department of Radiation Oncology  Radiation Therapy Center  [facility] [place] [address]  [phone]       Radiation Oncology Follow-up Visit  2019      Suzy Rodríguez  MRN: [id number]   : 1985     DIAGNOSIS: Low grade adrenal cortical carcinoma, right adrenal gland  INTENT OF RADIOTHERAPY: Curative  PATHOLOGY:    Low grade adrenal cortical carcinoma,  11.3 cm, necrosis and invasion into the adrenal capsule, + margins, tumot was less than 25% clear cells and a mitotic rate of 15 per 50 HPF. The Ki-67 mitotic rate was 20%, [id number]                               STAGE: [id number]  CONCURRENT SYSTEMIC THERAPY:   Mitotane       ONCOLOGIC HISTORY:          Ms. Rodríguez is a 33 year old female with a  [id number] adrenocortical carcinoma, s/p RT radical adrenalectomy with positive surgical margin.She underwent adjuvant radiotherapy to the right adrenal bed. Full oncologic history is outlined below.     She inially presented to emergency room on 2018 with acute onset left flank pain. Given her Hx of renal stones, she was evaluated by CT abdomen and pelvis on 18 showed 9.2 x 8 cm heterogeneous right upper quadrant mass with small foci of calcification, which is likely arising from the adrenal gland though inseparable from liver and upper pole of right kidney.There was a 6 x 4 x 4 mm upper third left ureteral obstructing stone with mild to moderate secondary hydronephrosis, collection of stones at lower pole left kidney extending over an area of approximately 6 x 7 x  4 mm, a non-obstructing 1 mm stone upper pole left kidney and a 2 mm nonobstructing stone noted upper pole right kidney with no hydronephrosis on the right.       She underwent left nephrolithiasis on 18 by Dr. Delvalle at [facility] urology clinic.Then she was referred to Dr. Patel for evaluation of her adrenal mass. On further questioning, She had noticed, over the past 8-9 months, large amount of  facial hair, irregular periods and some facial swelling.She denies any palpitations, headaches, or sweats.      Then she was referred Dr. Mansfield for possible surgical resection. The plan was to proceed with surgery given that the tumor mass was too larg to be observed.      She underwent right adrenalectomy on 6/25/18. The surgical pathology ([id number]) was low grade adrenal cortical carcinoma of a mass sized 11.3 cm. The tumor shows necrosis and invasion into the adrenal capsule. The margins were involved by the tumor.The tumor was less than 25% clear cells and a mitotic rate of 15 per 50 HPF. The Ki-67 mitotic rate was 20%. [id number]      For staging workup, she underwent MRI of the brain on 7/22/18 showed no evidence of brain metastases. Also, she had PET scan on 7/23/18 showed no suspicious metastatic lesions.        SITE OF TREATMENT:  Right adrenal bed     DATES  OF TREATMENT:  8/27/18 to 10/8/18     TOTAL DOSE OF TREATMENT:  5400 cGy in 30 fractions      SYSTEMIC THERAPY:  Concurrent mitotane     INTERVAL SINCE COMPLETION OF RADIATION THERAPY:   11 months     SUBJECTIVE:   The patient returns for follow up today. She is overall doing better now. She underwent CTCAP on 8/14/19 that did not demonstrate any evidence of recurrent disease in adrenal bed or distantly.  She continues on systemic therapy under the care of Dr. Cintron. Has had GI toxicity primarily with nausea and diarrhea. Nausea fairly well controlled with compazine and marijuana. Diarrhea slightly improved with imodium, but persists and requesting additional agent. Continues to follow up with endocrinology at the  and with  ([facility]). Will see Dr. Huertas in September 2019 per patient report. Recently started physical therapy in last couple weeks and has noticed some improvement since, although remains deconditioned.       PHYSICAL EXAM:  BP 94/63   Pulse 88   Resp 18   Wt 67.7 kg (149 lb 3.2 oz)   LMP 08/10/2019   SpO2 100%   BMI 25.60  kg/m    Gen: Alert, in NAD  Eyes: PERRL, EOMI, sclera anicteric  Neck: Supple, full ROM, no LAD  Pulm: No wheezing, stridor or respiratory distress  CV: Well-perfused, no cyanosis, no pedal edema  Abdominal: Soft, nontender, nondistended, no hepatomegaly  Back: No step-offs or pain to palpation along the thoracolumbar spine, no CVA tenderness  Musculoskeletal: Normal bulk and tone   Skin: Normal color and turgor  Neurologic: A/Ox3, CN II-XII intact  Psychiatric: Appropriate mood and affect    LABS AND IMAGIN19  CTCAP  FINDINGS:  Lungs: No pneumothorax, pleural effusion, focal airspace opacity, or  suspicious pulmonary nodule. Stable fibrotic changes in the right lung  base with mild bronchiolectasis, consistent with postradiation  changes.  Airways: Central tracheobronchial tree is clear.  Vessels: Main pulmonary artery and aorta are normal in caliber. No  main or lobar pulmonary embolus.  Heart: Heart size is normal without pericardial effusion.  Lymph nodes: No suspicious mediastinal or hilar lymphadenopathy.  Residual thymic tissue in the anterior mediastinum, unchanged.  Thyroid: Visualized portions of the thyroid gland are unremarkable     ABDOMEN PELVIS:     Liver: Stable 2.2 cm hemangioma inferiorly in the right hepatic lobe,  unchanged compared to MRI from 2018. No new or worrisome lesion in  the liver parenchyma. Normal hepatic vasculature. No intrahepatic or  extrahepatic biliary ductal dilatation.   Gallbladder: Normal appearing gallbladder. No stone or evidence of  gallstones.  Pancreas: No focal mass. No pancreatic ductal dilatation.  Stomach: Stable chronic postoperative changes of gastric bypass.  Spleen: Within normal limits.  Adrenal glands: Postoperative changes of right adrenalectomy. No new  suspicious soft tissue to suggest local tumor recurrence. Left adrenal  gland is unremarkable.  Kidneys: No focal mass, hydronephrosis, or stone.  Bladder: Decompressed bladder. Grossly  unremarkable  Reproductive organs: Increased size of numerous prominent right and  left ovarian cysts when compared recent CTs beginning 2/11/2019.  Largest right ovarian cyst measured as 6.4 x 5 cm with a thick wall,  previously 4.1 x 3 cm on 5/7/2019. Regarding the left ovary, largest  cyst is multilobulated/multi cystic approximately measuring as 4.5 x  4.1 cm, previously 4.3 x 3.1 cm. Normal appearing uterus. IUD is in  appropriate position.  Small Bowel: Growing short segment intussusception of the jejunum in  the left upper quadrant, slowly progressing since 2/11/2019. No  dilatation of the upstream segments. Normal appearance of the colon.  Moderate stool in the ascending and transverse colon. Postoperative  changes of Tanika-en-Y gastric bypass. Gastrojejunostomy and  jejunojejunostomy appear normal and patent.  Lymph nodes: No intra-abdominal or pelvic lymphadenopathy.  Vasculature: Patent and normal.     Bones and soft tissues: No suspicious soft tissue mass or fluid  collection. No suspicious osseous lesion. Small adjacent soft tissue  nodule associated with the left rectus abdominous musculature (series  6, image 102).                                                                      IMPRESSION: In this patient with right adrenal cortical carcinoma  status post adrenalectomy and currently under treatment with mitotane;  1. No evidence of local recurrent or metastatic disease.  2. Progressively increasing size of ovarian cysts. Ovarian macrocysts  are reported in premenopausal woman which are treated with mitotane.   3. Again seen is a jejunal-jejunal intussusception. When compared to  prior exam, there is increased segmental telescoping. No definitive  mass is identified to act as a lead point. Unclear if this represents  progression of previously seen intussusception or new/recurrent  intussusception. No upstream bowel dilatation to suggest obstruction  an no features of ischemia.       IMPRESSION:    Ms. Rodríguez is a 33 year old female with a  [id number] adrenocortical carcinoma, s/p RT radical adrenalectomy with positive surgical margin.She underwent adjuvant radiotherapy to the right adrenal bed. She completed RT on 10/8/18.    PLAN:   1. Remains AFUA. No late RT toxicity.  2. Having diarrhea, not controlled with Imodium. Discussed consideration of trial of lomotil which was prescribed.   3. Continue systemic therapy with Dr. Cintron.   4. Will see Dr. Huertas in [facility] for endocrine follow up.   5. Continue physical therapy.   6. RTC in 12 months. Defer future imaging to medical oncology.       Cesar Monsivais M.D.  Department of Radiation Oncology  [facility]

## 2019-08-27 NOTE — LETTER
2019      RE: Suzy Rodríguez  [address]  [facility] [address]       Radiation Oncology Follow-up Visit  2019      Suzy Rodríguez  MRN: [id number]   : 1985     DIAGNOSIS: Low grade adrenal cortical carcinoma, right adrenal gland  INTENT OF RADIOTHERAPY: Curative  PATHOLOGY:    Low grade adrenal cortical carcinoma,  11.3 cm, necrosis and invasion into the adrenal capsule, + margins, tumot was less than 25% clear cells and a mitotic rate of 15 per 50 HPF. The Ki-67 mitotic rate was 20%, [id number]                               STAGE: [id number]  CONCURRENT SYSTEMIC THERAPY:   Mitotane       ONCOLOGIC HISTORY:          Ms. Rodríguez is a 33 year old female with a  [id number] adrenocortical carcinoma, s/p RT radical adrenalectomy with positive surgical margin.She underwent adjuvant radiotherapy to the right adrenal bed. Full oncologic history is outlined below.     She inially presented to emergency room on 2018 with acute onset left flank pain. Given her Hx of renal stones, she was evaluated by CT abdomen and pelvis on 18 showed 9.2 x 8 cm heterogeneous right upper quadrant mass with small foci of calcification, which is likely arising from the adrenal gland though inseparable from liver and upper pole of right kidney.There was a 6 x 4 x 4 mm upper third left ureteral obstructing stone with mild to moderate secondary hydronephrosis, collection of stones at lower pole left kidney extending over an area of approximately 6 x 7 x  4 mm, a non-obstructing 1 mm stone upper pole left kidney and a 2 mm nonobstructing stone noted upper pole right kidney with no hydronephrosis on the right.       She underwent left nephrolithiasis on 18 by Dr. Delvalle at [facility] urology clinic.Then she was referred to Dr. Patel for evaluation of her adrenal mass. On further questioning, She had noticed, over the past 8-9 months, large amount of facial hair, irregular periods and some facial swelling.She  denies any palpitations, headaches, or sweats.      Then she was referred Dr. Mansfield for possible surgical resection. The plan was to proceed with surgery given that the tumor mass was too larg to be observed.      She underwent right adrenalectomy on 6/25/18. The surgical pathology ([id number]) was low grade adrenal cortical carcinoma of a mass sized 11.3 cm. The tumor shows necrosis and invasion into the adrenal capsule. The margins were involved by the tumor.The tumor was less than 25% clear cells and a mitotic rate of 15 per 50 HPF. The Ki-67 mitotic rate was 20%. [id number]      For staging workup, she underwent MRI of the brain on 7/22/18 showed no evidence of brain metastases. Also, she had PET scan on 7/23/18 showed no suspicious metastatic lesions.        SITE OF TREATMENT:  Right adrenal bed     DATES  OF TREATMENT:  8/27/18 to 10/8/18     TOTAL DOSE OF TREATMENT:  5400 cGy in 30 fractions      SYSTEMIC THERAPY:  Concurrent mitotane     INTERVAL SINCE COMPLETION OF RADIATION THERAPY:   11 months     SUBJECTIVE:   The patient returns for follow up today. She is overall doing better now. She underwent CTCAP on 8/14/19 that did not demonstrate any evidence of recurrent disease in adrenal bed or distantly.  She continues on systemic therapy under the care of Dr. Cintron. Has had GI toxicity primarily with nausea and diarrhea. Nausea fairly well controlled with compazine and marijuana. Diarrhea slightly improved with imodium, but persists and requesting additional agent. Continues to follow up with endocrinology at the  and with  ([facility]). Will see Dr. Huertas in September 2019 per patient report. Recently started physical therapy in last couple weeks and has noticed some improvement since, although remains deconditioned.       PHYSICAL EXAM:  BP 94/63   Pulse 88   Resp 18   Wt 67.7 kg (149 lb 3.2 oz)   LMP 08/10/2019   SpO2 100%   BMI 25.60 kg/m     Gen: Alert, in NAD  Eyes: PERRL, EOMI, sclera  anicteric  Neck: Supple, full ROM, no LAD  Pulm: No wheezing, stridor or respiratory distress  CV: Well-perfused, no cyanosis, no pedal edema  Abdominal: Soft, nontender, nondistended, no hepatomegaly  Back: No step-offs or pain to palpation along the thoracolumbar spine, no CVA tenderness  Musculoskeletal: Normal bulk and tone   Skin: Normal color and turgor  Neurologic: A/Ox3, CN II-XII intact  Psychiatric: Appropriate mood and affect    LABS AND IMAGIN19  CTCAP  FINDINGS:  Lungs: No pneumothorax, pleural effusion, focal airspace opacity, or  suspicious pulmonary nodule. Stable fibrotic changes in the right lung  base with mild bronchiolectasis, consistent with postradiation  changes.  Airways: Central tracheobronchial tree is clear.  Vessels: Main pulmonary artery and aorta are normal in caliber. No  main or lobar pulmonary embolus.  Heart: Heart size is normal without pericardial effusion.  Lymph nodes: No suspicious mediastinal or hilar lymphadenopathy.  Residual thymic tissue in the anterior mediastinum, unchanged.  Thyroid: Visualized portions of the thyroid gland are unremarkable     ABDOMEN PELVIS:     Liver: Stable 2.2 cm hemangioma inferiorly in the right hepatic lobe,  unchanged compared to MRI from 2018. No new or worrisome lesion in  the liver parenchyma. Normal hepatic vasculature. No intrahepatic or  extrahepatic biliary ductal dilatation.   Gallbladder: Normal appearing gallbladder. No stone or evidence of  gallstones.  Pancreas: No focal mass. No pancreatic ductal dilatation.  Stomach: Stable chronic postoperative changes of gastric bypass.  Spleen: Within normal limits.  Adrenal glands: Postoperative changes of right adrenalectomy. No new  suspicious soft tissue to suggest local tumor recurrence. Left adrenal  gland is unremarkable.  Kidneys: No focal mass, hydronephrosis, or stone.  Bladder: Decompressed bladder. Grossly unremarkable  Reproductive organs: Increased size of numerous  prominent right and  left ovarian cysts when compared recent CTs beginning 2/11/2019.  Largest right ovarian cyst measured as 6.4 x 5 cm with a thick wall,  previously 4.1 x 3 cm on 5/7/2019. Regarding the left ovary, largest  cyst is multilobulated/multi cystic approximately measuring as 4.5 x  4.1 cm, previously 4.3 x 3.1 cm. Normal appearing uterus. IUD is in  appropriate position.  Small Bowel: Growing short segment intussusception of the jejunum in  the left upper quadrant, slowly progressing since 2/11/2019. No  dilatation of the upstream segments. Normal appearance of the colon.  Moderate stool in the ascending and transverse colon. Postoperative  changes of Tanika-en-Y gastric bypass. Gastrojejunostomy and  jejunojejunostomy appear normal and patent.  Lymph nodes: No intra-abdominal or pelvic lymphadenopathy.  Vasculature: Patent and normal.     Bones and soft tissues: No suspicious soft tissue mass or fluid  collection. No suspicious osseous lesion. Small adjacent soft tissue  nodule associated with the left rectus abdominous musculature (series  6, image 102).                                                                      IMPRESSION: In this patient with right adrenal cortical carcinoma  status post adrenalectomy and currently under treatment with mitotane;  1. No evidence of local recurrent or metastatic disease.  2. Progressively increasing size of ovarian cysts. Ovarian macrocysts  are reported in premenopausal woman which are treated with mitotane.   3. Again seen is a jejunal-jejunal intussusception. When compared to  prior exam, there is increased segmental telescoping. No definitive  mass is identified to act as a lead point. Unclear if this represents  progression of previously seen intussusception or new/recurrent  intussusception. No upstream bowel dilatation to suggest obstruction  an no features of ischemia.       IMPRESSION:   Ms. Rodríguez is a 33 year old female with a  [id number]  adrenocortical carcinoma, s/p RT radical adrenalectomy with positive surgical margin.She underwent adjuvant radiotherapy to the right adrenal bed. She completed RT on 10/8/18.    PLAN:   1. Remains AFUA. No late RT toxicity.  2. Having diarrhea, not controlled with Imodium. Discussed consideration of trial of lomotil which was prescribed.   3. Continue systemic therapy with Dr. Cintron.   4. Will see Dr. Huertas in [facility] for endocrine follow up.   5. Continue physical therapy.   6. RTC in 12 months. Defer future imaging to medical oncology.       Cesar Monsivais M.D.  Department of Radiation Oncology  [facility]

## 2019-08-29 ENCOUNTER — HOSPITAL ENCOUNTER (OUTPATIENT)
Dept: PHYSICAL THERAPY | Facility: CLINIC | Age: 34
Setting detail: THERAPIES SERIES
End: 2019-08-29
Attending: INTERNAL MEDICINE
Payer: COMMERCIAL

## 2019-08-29 PROCEDURE — 97110 THERAPEUTIC EXERCISES: CPT | Mod: GP | Performed by: PHYSICAL THERAPIST

## 2019-08-29 ASSESSMENT — 6 MINUTE WALK TEST (6MWT): TOTAL DISTANCE WALKED (METERS): 948

## 2019-09-05 ENCOUNTER — OFFICE VISIT - HEALTHEAST (OUTPATIENT)
Dept: FAMILY MEDICINE | Facility: CLINIC | Age: 34
End: 2019-09-05

## 2019-09-05 DIAGNOSIS — F19.982 DRUG-INDUCED INSOMNIA (H): ICD-10-CM

## 2019-09-05 DIAGNOSIS — R11.2 NON-INTRACTABLE VOMITING WITH NAUSEA, UNSPECIFIED VOMITING TYPE: ICD-10-CM

## 2019-09-05 DIAGNOSIS — R35.0 URINE FREQUENCY: ICD-10-CM

## 2019-09-05 DIAGNOSIS — R19.7 DIARRHEA, UNSPECIFIED TYPE: ICD-10-CM

## 2019-09-05 DIAGNOSIS — N39.0 URINARY TRACT INFECTION WITHOUT HEMATURIA, SITE UNSPECIFIED: ICD-10-CM

## 2019-09-05 LAB
ALBUMIN UR-MCNC: NEGATIVE MG/DL
APPEARANCE UR: ABNORMAL
BACTERIA #/AREA URNS HPF: ABNORMAL HPF
BILIRUB UR QL STRIP: NEGATIVE
COLOR UR AUTO: YELLOW
GLUCOSE UR STRIP-MCNC: NEGATIVE MG/DL
HGB UR QL STRIP: ABNORMAL
KETONES UR STRIP-MCNC: NEGATIVE MG/DL
LEUKOCYTE ESTERASE UR QL STRIP: ABNORMAL
NITRATE UR QL: NEGATIVE
PH UR STRIP: 7 [PH] (ref 5–8)
RBC #/AREA URNS AUTO: ABNORMAL HPF
SP GR UR STRIP: 1.01 (ref 1–1.03)
SQUAMOUS #/AREA URNS AUTO: ABNORMAL LPF
UROBILINOGEN UR STRIP-ACNC: ABNORMAL
WBC #/AREA URNS AUTO: ABNORMAL HPF

## 2019-09-06 LAB
BACTERIA SPEC CULT: ABNORMAL
BACTERIA SPEC CULT: ABNORMAL

## 2019-09-24 RX ORDER — EPINEPHRINE 1 MG/ML
0.3 INJECTION, SOLUTION, CONCENTRATE INTRAVENOUS EVERY 5 MIN PRN
Status: CANCELLED | OUTPATIENT
Start: 2019-09-27

## 2019-09-24 RX ORDER — ALBUTEROL SULFATE 0.83 MG/ML
2.5 SOLUTION RESPIRATORY (INHALATION)
Status: CANCELLED | OUTPATIENT
Start: 2019-09-27

## 2019-09-24 RX ORDER — ALBUTEROL SULFATE 90 UG/1
1-2 AEROSOL, METERED RESPIRATORY (INHALATION)
Status: CANCELLED
Start: 2019-09-27

## 2019-09-24 RX ORDER — SODIUM CHLORIDE 9 MG/ML
1000 INJECTION, SOLUTION INTRAVENOUS CONTINUOUS PRN
Status: CANCELLED
Start: 2019-09-27

## 2019-09-24 RX ORDER — DIPHENHYDRAMINE HYDROCHLORIDE 50 MG/ML
50 INJECTION INTRAMUSCULAR; INTRAVENOUS
Status: CANCELLED
Start: 2019-09-27

## 2019-09-24 RX ORDER — EPINEPHRINE 0.3 MG/.3ML
0.3 INJECTION SUBCUTANEOUS EVERY 5 MIN PRN
Status: CANCELLED | OUTPATIENT
Start: 2019-09-27

## 2019-09-24 RX ORDER — MEPERIDINE HYDROCHLORIDE 25 MG/ML
25 INJECTION INTRAMUSCULAR; INTRAVENOUS; SUBCUTANEOUS EVERY 30 MIN PRN
Status: CANCELLED | OUTPATIENT
Start: 2019-09-27

## 2019-09-26 ENCOUNTER — COMMUNICATION - HEALTHEAST (OUTPATIENT)
Dept: FAMILY MEDICINE | Facility: CLINIC | Age: 34
End: 2019-09-26

## 2019-09-26 DIAGNOSIS — A60.00 HERPES SIMPLEX INFECTION OF GENITOURINARY SYSTEM: ICD-10-CM

## 2019-09-27 DIAGNOSIS — C74.01 MALIGNANT NEOPLASM OF CORTEX OF RIGHT ADRENAL GLAND (H): Primary | ICD-10-CM

## 2019-10-02 ENCOUNTER — ONCOLOGY VISIT (OUTPATIENT)
Dept: ONCOLOGY | Facility: CLINIC | Age: 34
End: 2019-10-02
Attending: INTERNAL MEDICINE
Payer: COMMERCIAL

## 2019-10-02 VITALS
RESPIRATION RATE: 16 BRPM | OXYGEN SATURATION: 100 % | HEART RATE: 88 BPM | DIASTOLIC BLOOD PRESSURE: 76 MMHG | TEMPERATURE: 98.2 F | WEIGHT: 149 LBS | BODY MASS INDEX: 25.56 KG/M2 | SYSTOLIC BLOOD PRESSURE: 115 MMHG

## 2019-10-02 DIAGNOSIS — N83.209 CYST OF OVARY, UNSPECIFIED LATERALITY: Primary | ICD-10-CM

## 2019-10-02 PROCEDURE — 99214 OFFICE O/P EST MOD 30 MIN: CPT | Mod: ZP | Performed by: INTERNAL MEDICINE

## 2019-10-02 PROCEDURE — G0463 HOSPITAL OUTPT CLINIC VISIT: HCPCS | Mod: ZF

## 2019-10-02 ASSESSMENT — PAIN SCALES - GENERAL: PAINLEVEL: NO PAIN (0)

## 2019-10-02 NOTE — PROGRESS NOTES
"MEDICAL ONCOLOGY CLINIC NOTE    REFERRING PROVIDER: Warren Mansfield MD from [facility] Urologic Oncology clinic.     REASON FOR CURRENT VISIT: Follow-up while on mitotane as adjuvant therapy of adrenocortical carcinoma.      HISTORY OF PRESENT ILLNESS: Ms. Rodríguez is a 33-year-old lady with history notable for right-sided functioning adrenocortical carcinoma (diagnosed in May 2018), who is here for follow-up while on adjuvant mitotane. Her oncologic history is detailed as under.     Suzy is not doing well right now. She's noted significant, severe fatigue, slurred speech, muscle pain, insomnia, and tremors since being on the higher dose of mitotane 1500mg AM - 1500mg afternoon and 1000mg HS. These has only improved somewhat since dropping the dose to 1000mg TID. Also on hydrocortisone 30mg QAM, 20 early PM. No recurrence of episode of dizziness/LOC. No history of CVA/TIA/palpitations/CP/SOA etc. Using Compazine 10mg BID for chronic, mitotane-associated nausea. No diarrhea/abd pain/bloody BM. She has 1-2 lose BMs a day and uses Imodium PRN. Using trazodone for sleep sometimes.     Last visit with Dr. Hilton at [facility] was on 9/24/19 and mitotane level came back at 18.8. She was asked by Dr. Hilton to stop mitotane for 2 weeks and then resume at 1000mg BID. Symptoms have improved but far from resolved since treatment interruption. She also follows with Dr. Veena Jaquez in our endocrinology department.    No clinical evidence of disease recurrence.     ONCOLOGIC HISTORY:   1. 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   - CT abdomen and pelvis stone protocol without contrast 5 8018 showed \"9.2 x 8 cm heterogeneous right upper quadrant mass with small foci of calcification within it which is likely arising from the adrenal gland though " "inseparable from liver and upper pole of right kidney. It is incompletely evaluated on this noncontrast study. 3 x 2.6 cm mass right lobe of liver on image #85 also incompletely evaluated. 6 x 4 x 4 mm upper third left ureteral obstructing stone with mild to moderate secondary hydronephrosis. Collection of stones at lower pole left kidney extending over an area of approximately 6 x 7 x 4 mm. Additional nonobstructing 1 mm stone upper pole left kidney. 2 mm nonobstructing stone noted upper pole right kidney with no hydronephrosis on the right. Postop change consistent with gastric bypass. Noncontrast images of spleen, left adrenal gland, gallbladder, and pancreas unremarkable. No aneurysm. No adenopathy.\"  - Seen by Dr. Delvalle at [facility] Urology clinic. Referred to Dr. Alvino Patel and then Dr. Warren Mansfield.  - Endocrinology team directed workup showed high DHEAS level of 740 pre-surgery.   - Right open adrenalectomy and hepatic mobilization performed by Dr. Warren Mansfield on 6/25/2018. Pathology showed adrenocortical carcinoma measuring 11.3 cm, weighing 413 g with extension into or through the adrenal capsule negative lymphovascular invasion, tumor necrosis present, margins involved by tumor, no regional lymph nodes identified, pathologic stage T2 NX.  - DHEAS normalized post-surgery.   - Adjuvant Mitotane started on 7/24/18. Dose increased to 2000mg TID around 10/23/18 due to persistently low troughs. Held 1/16/19 for two weeks and resumed 1/30 at 1000 mg po BID due to very poor tolerance of higher doses. Highest mitotane level was 10.2 on 2/11/19. Was taking 1500 AM-1500 afternoon-1000mg PM June-Aug 2019, but unable to tolerate. Dose reduced to 1000mg TID as of 08/14/19. Dose interrupted on 9/25/19 by Dr. Hilton due to severe side effects. Plan to resume therapy at 1000mg BID in mid Oct.  - Saw radiation oncology at [facility], radiation oncology at [facility], as well as " "endocrine oncology (Dr. Hilton) at [facility].   - S/p adjuvant RT by Dr. Monsivais - 5040 cGy over 30 fr (8/24/18-10/6/18).  - 2/11/19: CT C/A/P and MRI brain with contrast - no evidence of disease recurrence.  - 05/07/19: CT C/A/P with contrast - \"1. No evidence for locally recurrent disease. 2. No evidence for metastatic disease in the chest, abdomen, or pelvis. 3. A 2.5 x 2.2 cm peripherally enhancing mass in hepatic segment 5, unchanged enhancing likely hemangioma given the peripheral discontinuous enhancement on previous examination. No other suspicious liver lesions.\"  - 08/14/19: CT C/A/P with contrast - AFUA.    REVIEW OF SYSTEMS: 14 point ROS negative other than the symptoms noted above in the HPI.    PAST MEDICAL AND SURGICAL HISTORY: Reviewed today.  1. Right-sided adrenocortical carcinoma.  2. MHTFR mutation with h/o mutiple miscarriages.  3. Ovarian cysts diagnosed in 2011.  4. H/o gastric bypass in 2016 for obesity.    SOCIAL HISTORY:   Social History     Tobacco Use     Smoking status: Never Smoker     Smokeless tobacco: Never Used   Substance Use Topics     Alcohol use: Yes     Comment: occ.     Drug use: No     FAMILY HISTORY:   No clustering of malignancies.    ALLERGIES:   Allergies   Allergen Reactions     Bee Venom Swelling     Ibuprofen Hives, Swelling and Other (See Comments)     CURRENT MEDICATIONS:   Current Outpatient Medications:      acetaminophen (TYLENOL) 325 MG tablet, Take 2 tablets (650 mg) by mouth every 4 hours as needed for other (multimodal surgical pain management along with NSAIDS and opioid medication as indicated based on pain control and physical function.), Disp: 150 tablet, Rfl: 0     buPROPion (WELLBUTRIN) 100 MG tablet, TAKE 1 TABLET (100 MG TOTAL) BY MOUTH 2 (TWO) TIMES A DAY., Disp: , Rfl: 3     calcium carbonate antacid 1000 MG CHEW, Take 1 tablet by mouth every morning , Disp: , Rfl:      cholecalciferol (VITAMIN D-1000 MAX ST) 1000 units TABS, Take 2,000 " Units by mouth every morning , Disp: , Rfl:      diphenoxylate-atropine (LOMOTIL) 2.5-0.025 MG tablet, Take 1 tablet by mouth 3 times daily as needed for diarrhea, Disp: 90 tablet, Rfl: 3     ferrous sulfate (IRON) 325 (65 Fe) MG tablet, Take 325 mg by mouth daily, Disp: , Rfl:      fludrocortisone (FLORINEF) 0.1 MG tablet, Take 0.5 tablets (0.05 mg) by mouth daily, Disp: 45 tablet, Rfl: 3     FLUoxetine (PROZAC) 40 MG capsule, Take 40 mg by mouth daily, Disp: , Rfl:      gabapentin (NEURONTIN) 300 MG capsule, Take 300 mg by mouth 3 times daily, Disp: , Rfl:      hydrocortisone (CORTEF) 10 MG tablet, Take 3 pills (=30 mg) in AM and 2 pills (=20 mg) at 2 pm daily, additional as directed., Disp: 180 tablet, Rfl: 11     levothyroxine (SYNTHROID/LEVOTHROID) 125 MCG tablet, Take 1 tablet (125 mcg) by mouth daily, Disp: 90 tablet, Rfl: 3     mitotane (LYSODREN) 500 MG tablet CHEMO, Take 2 tablets (1,000 mg) by mouth 3 times daily, Disp: 240 tablet, Rfl: 0     Multiple Vitamins-Calcium (ONE-A-DAY WOMENS PO), Take 2 tablets by mouth every morning , Disp: , Rfl:      prochlorperazine (COMPAZINE) 5 MG tablet, Take 1 tablet (5 mg) by mouth every 8 hours as needed for nausea or vomiting, Disp: 90 tablet, Rfl: 3     traZODone (DESYREL) 50 MG tablet, Take 2 tablets (100 mg) by mouth nightly as needed for sleep, Disp: 60 tablet, Rfl: 0     UNABLE TO FIND, medical cannabis (Patient's own supply. Not a prescription), Disp: , Rfl:      cyclobenzaprine (FLEXERIL) 5 MG tablet, Take 5-10 mg by mouth, Disp: , Rfl:      diazepam (VALIUM) 5 MG tablet, Take 1 tablet (5 mg) by mouth once as needed for anxiety (MRI claustrophobia management) (Patient not taking: Reported on 10/2/2019), Disp: 2 tablet, Rfl: 3     EPINEPHrine (EPIPEN/ADRENACLICK/OR ANY BX GENERIC EQUIV) 0.3 MG/0.3ML injection 2-pack, As directed for bee stings, Disp: , Rfl:      hydrocortisone sodium succinate PF (SOLU-CORTEF) 100 MG injection, Inject 2 mLs (100 mg) into the  muscle once for 1 dose Use in emergency situations as discussed in clinic. (Patient not taking: Reported on 8/14/2019), Disp: 2 mL, Rfl: 1     pantoprazole (PROTONIX) 40 MG EC tablet, Take 1 tablet (40 mg) by mouth daily (Patient not taking: Reported on 10/2/2019), Disp: 30 tablet, Rfl: 0    PHYSICAL EXAMINATION:  Vital signs: /76   Pulse 88   Temp 98.2  F (36.8  C) (Oral)   Resp 16   Wt 67.6 kg (149 lb)   SpO2 100%   BMI 25.56 kg/m    ECOG performance status of 2.   GENERAL: Young lady, sitting in chair. Appears tremulous and fatigued, but in no acute distress.  HEENT: No icterus, no pallor. MMM, PERRL, OP clear.   NECK: Supple, no JVD/LAD.  LUNGS: CTAB, normal WOB on RA.  CARDIOVASCULAR: Regular rate and rhythm, no murmurs.   ABDOMEN: Well-healing posterior right sided abdominal incision without concerning findings. Soft, NT, ND, no masses appreciated, normal BS.   EXTREMITIES: No cyanosis, no clubbing, no edema.   NEUROLOGIC: Alert and fully oriented. Hands and feet are tremulous.   PSYCH: Affect flat, mood slightly anxious/depressed.    LABORATORY DATA:   Labs from [facility] 9/24/19 - WBC 4200, ANC 2600, Hb 10.4, Platelets 218K, lytes WNL, creat 0.61, Calcium 8, albumin 3.5, LFTs normal, mitotane level 18.8, cortisol 25.8, ACTH<5, aldosterone 4.9, free T4 1.17, TSH 0.07, DHEAS <15, renin plasma 10.6.     Lab Test 08/14/19  1050 07/17/19  1241 06/18/19  1230   WBC 4.8 5.5 4.1   RBC 4.65 4.86 4.37   HGB 11.4* 12.0 11.0*   HCT 37.9 39.4 36.1   MCV 82 81 83   MCH 24.5* 24.7* 25.2*   MCHC 30.1* 30.5* 30.5*   RDW 14.8 14.9 15.2*    307 250   NEUTROPHIL 67.3 84.0 73.8    137 137   POTASSIUM 3.6 3.5 3.2*   CHLORIDE 106 104 105   CO2 28 27 21   ANIONGAP 5 6 10   GLC 85 82 160*   BUN 10 7 6*   CR 0.72 0.71 0.55   SHASHA 7.8* 8.0* 7.6*   PROTTOTAL 6.0* 6.7* 5.9*   ALBUMIN 2.9* 3.4 2.9*   BILITOTAL 0.2 0.3 0.2   ALKPHOS 110 133 107   AST 16 24 20   ALT 25 28 21     Lab Test 08/14/19  1050 07/17/19  1241  "06/18/19  1230 05/08/19  1013 04/10/19  1030   TSH 0.04* 0.04* 0.09* 0.27* 0.71   T4 0.92 1.14 1.00 0.98 1.06     Lab Test 08/14/19  1050 07/17/19  1241   CHOL 180 183   HDL 99 117   LDL 48 44   TRIG 165* 110   DHEAS was 740 on 6/5/18, and has been <15 on 7/12/18, 8/20/18, 9/19/18, 10/23/18, 11/27/18, 1/9/19, 5/8/19, 6/18/19, 7/17/19. Pending from today.  Mitotane level (target 14-20): 1.8ng/dl on 8/20/18, 3.5 on 10/25/18, 6.2 on 12/4/18, 8.1 on 1/9/19 and 10.2 on 2/11/19, 14.2 on 6/18/19. Pending from today.    IMAGING STUDIES:  As above.    ASSESSMENT AND PLAN: A 33-year-old lady with right-sided functioning adrenocortical carcinoma, who is here for follow-up while on adjuvant mitotane.     Adrenocortical carcinoma:  - Started on adjuvant mitotane in July 2018 based on her presentation and tumor characteristics including invasion of the adrenal capsule, high mitotic rate, possibly positive margins, and high Ki67, which could result in a rate of recurrence as high as 40%.    - Restaging CT C/A/P from today continues to show no evidence of disease recurrence ([employer] message sent). No evidence of clinical or biochemical recurrence either.   - Despite multiple supportive care interventions, patient has NOT been able to tolerate higher doses (above 3gm/day) of mitotane. Still has significant adverse events despite being on 1000mg TID.   - Agree with holding mitotane for 2, and perhaps (as I strongly recommended) 3 weeks from 9/25/19 to allow full resolution of adverse events. Then resume at 1000mg BID.  - We've had numerous conversations regarding goals of therapy and that mitotane doesn't work on an \"all or none\" basis with no activity below therapeutic level. In her case, it is critical to not aim for a number but to actually aim for optimizing tolerance to therapy to allow continuation for another 4 years. Suzy and her  are in agreement.   - Monthly Mitotane level, CBC, comprehensive panel, TSH, FT4, " cholesterol panel, 24-hour Urine Free Cortisol, and DHEAS.   - Plan to continue adjuvant Mitotane for up to 5 yrs if tolerated.  - Next restaging CT C/A/P in Nov or Dec 2019.     Concern for ativan use disorder:  - I counseled her of the addictive potential of Ativan and she did agree that she has felt increasingly desiring this medication.   - We have stopped Ativan and I would encourage other medical providers to not give any further refills.  - Discussed health coping strategies including cognitive behavioral interventions. Seen by our psychology team as well.     Insomnia:  - Trazodone is working well but she's taking 100mg at bedtime. Will give refills PRN.   - EKG with QTc 488 ms in April.    Endocrinopathies:  - Mgmt per endocrine team at the . Follow-up planned in the next couple months.    Multifocal macroovarian cysts:  - These can happen with mitotane and are likely not cancerous.   - Referred to Gyn today.     Return to see me in 1 month with labs. Patient and family given opportunity to ask questions, which were answered to their satisfaction. They're in complete agreement as planned.    BILLIN.     Benson Cintron M.D.  . Professor of Medicine  Genitourinary Oncology  Division of Hematology, Oncology & Transplantation  [facility]

## 2019-10-02 NOTE — NURSING NOTE
"Oncology Rooming Note    October 2, 2019 10:29 AM   Suzy Rodríguez is a 33 year old female who presents for:    Chief Complaint   Patient presents with     Oncology Clinic Visit     Return; Adrenal Cortical Ca     Initial Vitals: /76   Pulse 88   Temp 98.2  F (36.8  C) (Oral)   Resp 16   Wt 67.6 kg (149 lb)   SpO2 100%   BMI 25.56 kg/m   Estimated body mass index is 25.56 kg/m  as calculated from the following:    Height as of 7/17/19: 1.626 m (5' 4.02\").    Weight as of this encounter: 67.6 kg (149 lb). Body surface area is 1.75 meters squared.  No Pain (0) Comment: Data Unavailable   No LMP recorded. (Menstrual status: IUD).  Allergies reviewed: Yes  Medications reviewed: Yes    Medications: Medication refills not needed today.  Pharmacy name entered into [employer]: [facility] - [id number]    Clinical concerns: Pt would like an order for a gyn provider, per provider in [place]. Dr Cintron was notified.      Rhiannon Man CMA              "

## 2019-10-02 NOTE — LETTER
10/2/2019       RE: Suzy Rodríguez  [address]  [facility] [address]     Dear Colleague,    Thank you for referring your patient, Suzy Rodríguez, to the [facility]. Please see a copy of my visit note below.    MEDICAL ONCOLOGY CLINIC NOTE    REFERRING PROVIDER: Warren Mansfield MD from [facility] Urologic Oncology clinic.     REASON FOR CURRENT VISIT: Follow-up while on mitotane as adjuvant therapy of adrenocortical carcinoma.      HISTORY OF PRESENT ILLNESS: Ms. Rodríguez is a 33-year-old lady with history notable for right-sided functioning adrenocortical carcinoma (diagnosed in May 2018), who is here for follow-up while on adjuvant mitotane. Her oncologic history is detailed as under.     Suzy is not doing well right now. She's noted significant, severe fatigue, slurred speech, muscle pain, insomnia, and tremors since being on the higher dose of mitotane 1500mg AM - 1500mg afternoon and 1000mg HS. These has only improved somewhat since dropping the dose to 1000mg TID. Also on hydrocortisone 30mg QAM, 20 early PM. No recurrence of episode of dizziness/LOC. No history of CVA/TIA/palpitations/CP/SOA etc. Using Compazine 10mg BID for chronic, mitotane-associated nausea. No diarrhea/abd pain/bloody BM. She has 1-2 lose BMs a day and uses Imodium PRN. Using trazodone for sleep sometimes.     Last visit with Dr. Hilton at [facility] was on 9/24/19 and mitotane level came back at 18.8. She was asked by Dr. Hilton to stop mitotane for 2 weeks and then resume at 1000mg BID. Symptoms have improved but far from resolved since treatment interruption. She also follows with Dr. Veena Jaquez in our endocrinology department.    No clinical evidence of disease recurrence.     ONCOLOGIC HISTORY:   1. Right adrenocortical carcinoma, localized, high-risk (Ki67 20%; adrenal capsular invasion present, mitotic rate 15 per 50 HPF):  - Presented to emergency room on 5/8/2018 with acute onset  "left flank pain.   - CT abdomen and pelvis stone protocol without contrast 5 6600 showed \"9.2 x 8 cm heterogeneous right upper quadrant mass with small foci of calcification within it which is likely arising from the adrenal gland though inseparable from liver and upper pole of right kidney. It is incompletely evaluated on this noncontrast study. 3 x 2.6 cm mass right lobe of liver on image #85 also incompletely evaluated. 6 x 4 x 4 mm upper third left ureteral obstructing stone with mild to moderate secondary hydronephrosis. Collection of stones at lower pole left kidney extending over an area of approximately 6 x 7 x 4 mm. Additional nonobstructing 1 mm stone upper pole left kidney. 2 mm nonobstructing stone noted upper pole right kidney with no hydronephrosis on the right. Postop change consistent with gastric bypass. Noncontrast images of spleen, left adrenal gland, gallbladder, and pancreas unremarkable. No aneurysm. No adenopathy.\"  - Seen by Dr. Delvalle at [facility] Urology clinic. Referred to Dr. Alvino Patel and then Dr. Warren Mansfield.  - Endocrinology team directed workup showed high DHEAS level of 740 pre-surgery.   - Right open adrenalectomy and hepatic mobilization performed by Dr. Warren Mansfield on 6/25/2018. Pathology showed adrenocortical carcinoma measuring 11.3 cm, weighing 413 g with extension into or through the adrenal capsule negative lymphovascular invasion, tumor necrosis present, margins involved by tumor, no regional lymph nodes identified, pathologic stage T2 NX.  - DHEAS normalized post-surgery.   - Adjuvant Mitotane started on 7/24/18. Dose increased to 2000mg TID around 10/23/18 due to persistently low troughs. Held 1/16/19 for two weeks and resumed 1/30 at 1000 mg po BID due to very poor tolerance of higher doses. Highest mitotane level was 10.2 on 2/11/19. Was taking 1500 AM-1500 afternoon-1000mg PM June-Aug 2019, but unable to tolerate. Dose reduced to 1000mg TID as of " "08/14/19. Dose interrupted on 9/25/19 by Dr. Hilton due to severe side effects. Plan to resume therapy at 1000mg BID in mid Oct.  - Saw radiation oncology at [facility], radiation oncology at [facility], as well as endocrine oncology (Dr. Hilton) at [facility].   - S/p adjuvant RT by Dr. Monsivais - 5040 cGy over 30 fr (8/24/18-10/6/18).  - 2/11/19: CT C/A/P and MRI brain with contrast - no evidence of disease recurrence.  - 05/07/19: CT C/A/P with contrast - \"1. No evidence for locally recurrent disease. 2. No evidence for metastatic disease in the chest, abdomen, or pelvis. 3. A 2.5 x 2.2 cm peripherally enhancing mass in hepatic segment 5, unchanged enhancing likely hemangioma given the peripheral discontinuous enhancement on previous examination. No other suspicious liver lesions.\"  - 08/14/19: CT C/A/P with contrast - AFUA.    REVIEW OF SYSTEMS: 14 point ROS negative other than the symptoms noted above in the HPI.    PAST MEDICAL AND SURGICAL HISTORY: Reviewed today.  1. Right-sided adrenocortical carcinoma.  2. MHTFR mutation with h/o mutiple miscarriages.  3. Ovarian cysts diagnosed in 2011.  4. H/o gastric bypass in 2016 for obesity.    SOCIAL HISTORY:   Social History     Tobacco Use     Smoking status: Never Smoker     Smokeless tobacco: Never Used   Substance Use Topics     Alcohol use: Yes     Comment: occ.     Drug use: No     FAMILY HISTORY:   No clustering of malignancies.    ALLERGIES:   Allergies   Allergen Reactions     Bee Venom Swelling     Ibuprofen Hives, Swelling and Other (See Comments)     CURRENT MEDICATIONS:   Current Outpatient Medications:      acetaminophen (TYLENOL) 325 MG tablet, Take 2 tablets (650 mg) by mouth every 4 hours as needed for other (multimodal surgical pain management along with NSAIDS and opioid medication as indicated based on pain control and physical function.), Disp: 150 tablet, Rfl: 0     buPROPion (WELLBUTRIN) 100 MG tablet, TAKE 1 " TABLET (100 MG TOTAL) BY MOUTH 2 (TWO) TIMES A DAY., Disp: , Rfl: 3     calcium carbonate antacid 1000 MG CHEW, Take 1 tablet by mouth every morning , Disp: , Rfl:      cholecalciferol (VITAMIN D-1000 MAX ST) 1000 units TABS, Take 2,000 Units by mouth every morning , Disp: , Rfl:      diphenoxylate-atropine (LOMOTIL) 2.5-0.025 MG tablet, Take 1 tablet by mouth 3 times daily as needed for diarrhea, Disp: 90 tablet, Rfl: 3     ferrous sulfate (IRON) 325 (65 Fe) MG tablet, Take 325 mg by mouth daily, Disp: , Rfl:      fludrocortisone (FLORINEF) 0.1 MG tablet, Take 0.5 tablets (0.05 mg) by mouth daily, Disp: 45 tablet, Rfl: 3     FLUoxetine (PROZAC) 40 MG capsule, Take 40 mg by mouth daily, Disp: , Rfl:      gabapentin (NEURONTIN) 300 MG capsule, Take 300 mg by mouth 3 times daily, Disp: , Rfl:      hydrocortisone (CORTEF) 10 MG tablet, Take 3 pills (=30 mg) in AM and 2 pills (=20 mg) at 2 pm daily, additional as directed., Disp: 180 tablet, Rfl: 11     levothyroxine (SYNTHROID/LEVOTHROID) 125 MCG tablet, Take 1 tablet (125 mcg) by mouth daily, Disp: 90 tablet, Rfl: 3     mitotane (LYSODREN) 500 MG tablet CHEMO, Take 2 tablets (1,000 mg) by mouth 3 times daily, Disp: 240 tablet, Rfl: 0     Multiple Vitamins-Calcium (ONE-A-DAY WOMENS PO), Take 2 tablets by mouth every morning , Disp: , Rfl:      prochlorperazine (COMPAZINE) 5 MG tablet, Take 1 tablet (5 mg) by mouth every 8 hours as needed for nausea or vomiting, Disp: 90 tablet, Rfl: 3     traZODone (DESYREL) 50 MG tablet, Take 2 tablets (100 mg) by mouth nightly as needed for sleep, Disp: 60 tablet, Rfl: 0     UNABLE TO FIND, medical cannabis (Patient's own supply. Not a prescription), Disp: , Rfl:      cyclobenzaprine (FLEXERIL) 5 MG tablet, Take 5-10 mg by mouth, Disp: , Rfl:      diazepam (VALIUM) 5 MG tablet, Take 1 tablet (5 mg) by mouth once as needed for anxiety (MRI claustrophobia management) (Patient not taking: Reported on 10/2/2019), Disp: 2 tablet, Rfl:  3     EPINEPHrine (EPIPEN/ADRENACLICK/OR ANY BX GENERIC EQUIV) 0.3 MG/0.3ML injection 2-pack, As directed for bee stings, Disp: , Rfl:      hydrocortisone sodium succinate PF (SOLU-CORTEF) 100 MG injection, Inject 2 mLs (100 mg) into the muscle once for 1 dose Use in emergency situations as discussed in clinic. (Patient not taking: Reported on 8/14/2019), Disp: 2 mL, Rfl: 1     pantoprazole (PROTONIX) 40 MG EC tablet, Take 1 tablet (40 mg) by mouth daily (Patient not taking: Reported on 10/2/2019), Disp: 30 tablet, Rfl: 0    PHYSICAL EXAMINATION:  Vital signs: /76   Pulse 88   Temp 98.2  F (36.8  C) (Oral)   Resp 16   Wt 67.6 kg (149 lb)   SpO2 100%   BMI 25.56 kg/m     ECOG performance status of 2.   GENERAL: Young lady, sitting in chair. Appears tremulous and fatigued, but in no acute distress.  HEENT: No icterus, no pallor. MMM, PERRL, OP clear.   NECK: Supple, no JVD/LAD.  LUNGS: CTAB, normal WOB on RA.  CARDIOVASCULAR: Regular rate and rhythm, no murmurs.   ABDOMEN: Well-healing posterior right sided abdominal incision without concerning findings. Soft, NT, ND, no masses appreciated, normal BS.   EXTREMITIES: No cyanosis, no clubbing, no edema.   NEUROLOGIC: Alert and fully oriented. Hands and feet are tremulous.   PSYCH: Affect flat, mood slightly anxious/depressed.    LABORATORY DATA:   Labs from [facility] 9/24/19 - WBC 4200, ANC 2600, Hb 10.4, Platelets 218K, lytes WNL, creat 0.61, Calcium 8, albumin 3.5, LFTs normal, mitotane level 18.8, cortisol 25.8, ACTH<5, aldosterone 4.9, free T4 1.17, TSH 0.07, DHEAS <15, renin plasma 10.6.     Lab Test 08/14/19  1050 07/17/19  1241 06/18/19  1230   WBC 4.8 5.5 4.1   RBC 4.65 4.86 4.37   HGB 11.4* 12.0 11.0*   HCT 37.9 39.4 36.1   MCV 82 81 83   MCH 24.5* 24.7* 25.2*   MCHC 30.1* 30.5* 30.5*   RDW 14.8 14.9 15.2*    307 250   NEUTROPHIL 67.3 84.0 73.8    137 137   POTASSIUM 3.6 3.5 3.2*   CHLORIDE 106 104 105   CO2 28 27 21   ANIONGAP 5 6 10   GLC  "85 82 160*   BUN 10 7 6*   CR 0.72 0.71 0.55   SHASHA 7.8* 8.0* 7.6*   PROTTOTAL 6.0* 6.7* 5.9*   ALBUMIN 2.9* 3.4 2.9*   BILITOTAL 0.2 0.3 0.2   ALKPHOS 110 133 107   AST 16 24 20   ALT 25 28 21     Lab Test 08/14/19  1050 07/17/19  1241 06/18/19  1230 05/08/19  1013 04/10/19  1030   TSH 0.04* 0.04* 0.09* 0.27* 0.71   T4 0.92 1.14 1.00 0.98 1.06     Lab Test 08/14/19  1050 07/17/19  1241   CHOL 180 183   HDL 99 117   LDL 48 44   TRIG 165* 110   DHEAS was 740 on 6/5/18, and has been <15 on 7/12/18, 8/20/18, 9/19/18, 10/23/18, 11/27/18, 1/9/19, 5/8/19, 6/18/19, 7/17/19. Pending from today.  Mitotane level (target 14-20): 1.8ng/dl on 8/20/18, 3.5 on 10/25/18, 6.2 on 12/4/18, 8.1 on 1/9/19 and 10.2 on 2/11/19, 14.2 on 6/18/19. Pending from today.    IMAGING STUDIES:  As above.    ASSESSMENT AND PLAN: A 33-year-old lady with right-sided functioning adrenocortical carcinoma, who is here for follow-up while on adjuvant mitotane.     Adrenocortical carcinoma:  - Started on adjuvant mitotane in July 2018 based on her presentation and tumor characteristics including invasion of the adrenal capsule, high mitotic rate, possibly positive margins, and high Ki67, which could result in a rate of recurrence as high as 40%.    - Restaging CT C/A/P from today continues to show no evidence of disease recurrence ([employer] message sent). No evidence of clinical or biochemical recurrence either.   - Despite multiple supportive care interventions, patient has NOT been able to tolerate higher doses (above 3gm/day) of mitotane. Still has significant adverse events despite being on 1000mg TID.   - Agree with holding mitotane for 2, and perhaps (as I strongly recommended) 3 weeks from 9/25/19 to allow full resolution of adverse events. Then resume at 1000mg BID.  - We've had numerous conversations regarding goals of therapy and that mitotane doesn't work on an \"all or none\" basis with no activity below therapeutic level. In her case, it is " critical to not aim for a number but to actually aim for optimizing tolerance to therapy to allow continuation for another 4 years. Suzy and her  are in agreement.   - Monthly Mitotane level, CBC, comprehensive panel, TSH, FT4, cholesterol panel, 24-hour Urine Free Cortisol, and DHEAS.   - Plan to continue adjuvant Mitotane for up to 5 yrs if tolerated.  - Next restaging CT C/A/P in Nov or Dec 2019.     Concern for ativan use disorder:  - I counseled her of the addictive potential of Ativan and she did agree that she has felt increasingly desiring this medication.   - We have stopped Ativan and I would encourage other medical providers to not give any further refills.  - Discussed health coping strategies including cognitive behavioral interventions. Seen by our psychology team as well.     Insomnia:  - Trazodone is working well but she's taking 100mg at bedtime. Will give refills PRN.   - EKG with QTc 488 ms in April.    Endocrinopathies:  - Mgmt per endocrine team at the . Follow-up planned in the next couple months.    Multifocal macroovarian cysts:  - These can happen with mitotane and are likely not cancerous.   - Referred to Gyn today.     Return to see me in 1 month with labs. Patient and family given opportunity to ask questions, which were answered to their satisfaction. They're in complete agreement as planned.    BILLIN.     Benson Cintron M.D.  . Professor of Medicine  Genitourinary Oncology  Division of Hematology, Oncology & Transplantation  [facility]

## 2019-10-09 DIAGNOSIS — C74.01 MALIGNANT NEOPLASM OF CORTEX OF RIGHT ADRENAL GLAND (H): Primary | ICD-10-CM

## 2019-10-14 ENCOUNTER — HOSPITAL ENCOUNTER (OUTPATIENT)
Dept: PHYSICAL THERAPY | Facility: CLINIC | Age: 34
Setting detail: THERAPIES SERIES
End: 2019-10-14
Attending: INTERNAL MEDICINE
Payer: COMMERCIAL

## 2019-10-14 PROCEDURE — 97110 THERAPEUTIC EXERCISES: CPT | Mod: GP | Performed by: PHYSICAL THERAPIST

## 2019-10-14 ASSESSMENT — 6 MINUTE WALK TEST (6MWT): TOTAL DISTANCE WALKED (METERS): 1175

## 2019-10-14 NOTE — PROGRESS NOTES
Outpatient Physical Therapy Discharge Note     Patient: Suzy Rodríguez  : 1985    Beginning/End Dates of Reporting Period:  19 to 10/14/2019.  Total # of Rx sessions: 5/10.  Pt last seen 19, returns today.     Referring Provider: Benson Cintron Diagnosis: Genl'l Mm weakness, Malignant neoplasm of R Adrenal Gland.      Client Self Report: Was off chemo x 3 weeks, and just restarted chemo. HA's about every 2 weeks. Anxious.     Objective Measurements:  Objective Measure: FACIT   Details: 10/14/19  Score 15;  INITIALLY:  Score 10.     Objective Measure: 6 minute walk test   Details: 10/14/19 - 1175 feet  INITIALLY:  948 feet     Objective Measure: Vitals  Details: Before Ex: HR 93, O2 98, /75  After Ex: , O2 95%, /65.     Objective Measure:  Strength   Details: 10/14/19  R 67, L 65.  INITIALLY:  R 60, L 60      Outcome Measures (most recent score):  FACIT Fatigue Subscale (score out of 52). The higher the score, the better the QOL.: 15  Six Minute Walk (meters). An increase of 70 or more meters indicates statistically significant change.: 1175    Goals:  Goal Identifier 1   Goal Description STG: Pt will be able to report NOLASCO's EOD or less. 10/14/19  MET    Target Date 19   Date Met  10/14/19   Progress:     Goal Identifier 2   Goal Description STG: Pt will be able to note improved ability to do laundry.  10/14/19 NOT MET    Target Date 19   Date Met      Progress:     Goal Identifier 3   Goal Description STG: Pt will be able to note less leg pain, 3/10 or less, and soreness the day after activities.    NOT MET    Target Date 19   Date Met      Progress:     Goal Identifier 4   Goal Description LTG: Pt will be able to be educated about the CRF program and be able to continue on her own.  10/14/19  NOT MET   Target Date 10/07/19   Date Met      Progress:     Progress Toward Goals:   Progress this reporting period: Pt has met 1/4 goals.      Plan:  Continue therapy per current plan of care.    Discharge:  No  Liz Mei PT, MTC (#[id number])  [facility]           [phone]  Fax          [phone]  Appt #      [phone]

## 2019-10-15 ENCOUNTER — COMMUNICATION - HEALTHEAST (OUTPATIENT)
Dept: FAMILY MEDICINE | Facility: CLINIC | Age: 34
End: 2019-10-15

## 2019-10-15 ENCOUNTER — OFFICE VISIT - HEALTHEAST (OUTPATIENT)
Dept: FAMILY MEDICINE | Facility: CLINIC | Age: 34
End: 2019-10-15

## 2019-10-15 DIAGNOSIS — C74.90: ICD-10-CM

## 2019-10-15 DIAGNOSIS — F41.9 ANXIETY: ICD-10-CM

## 2019-10-15 DIAGNOSIS — Z23 IMMUNIZATION DUE: ICD-10-CM

## 2019-10-15 ASSESSMENT — MIFFLIN-ST. JEOR: SCORE: 1333.85

## 2019-10-15 ASSESSMENT — ANXIETY QUESTIONNAIRES
GAD7 TOTAL SCORE: 19
7. FEELING AFRAID AS IF SOMETHING AWFUL MIGHT HAPPEN: NEARLY EVERY DAY
4. TROUBLE RELAXING: NEARLY EVERY DAY
6. BECOMING EASILY ANNOYED OR IRRITABLE: NEARLY EVERY DAY
5. BEING SO RESTLESS THAT IT IS HARD TO SIT STILL: NEARLY EVERY DAY
2. NOT BEING ABLE TO STOP OR CONTROL WORRYING: NEARLY EVERY DAY
1. FEELING NERVOUS, ANXIOUS, OR ON EDGE: NEARLY EVERY DAY
IF YOU CHECKED OFF ANY PROBLEMS ON THIS QUESTIONNAIRE, HOW DIFFICULT HAVE THESE PROBLEMS MADE IT FOR YOU TO DO YOUR WORK, TAKE CARE OF THINGS AT HOME, OR GET ALONG WITH OTHER PEOPLE: EXTREMELY DIFFICULT
3. WORRYING TOO MUCH ABOUT DIFFERENT THINGS: SEVERAL DAYS

## 2019-10-18 ENCOUNTER — COMMUNICATION - HEALTHEAST (OUTPATIENT)
Dept: SURGERY | Facility: CLINIC | Age: 34
End: 2019-10-18

## 2019-10-24 ENCOUNTER — RECORDS - HEALTHEAST (OUTPATIENT)
Dept: ADMINISTRATIVE | Facility: OTHER | Age: 34
End: 2019-10-24

## 2019-10-24 ENCOUNTER — COMMUNICATION - HEALTHEAST (OUTPATIENT)
Dept: FAMILY MEDICINE | Facility: CLINIC | Age: 34
End: 2019-10-24

## 2019-10-25 ENCOUNTER — OFFICE VISIT - HEALTHEAST (OUTPATIENT)
Dept: FAMILY MEDICINE | Facility: CLINIC | Age: 34
End: 2019-10-25

## 2019-10-25 ENCOUNTER — COMMUNICATION - HEALTHEAST (OUTPATIENT)
Dept: FAMILY MEDICINE | Facility: CLINIC | Age: 34
End: 2019-10-25

## 2019-10-25 ENCOUNTER — AMBULATORY - HEALTHEAST (OUTPATIENT)
Dept: LAB | Facility: CLINIC | Age: 34
End: 2019-10-25

## 2019-10-25 DIAGNOSIS — R30.0 DYSURIA: ICD-10-CM

## 2019-10-25 DIAGNOSIS — N39.0 ACUTE UTI (URINARY TRACT INFECTION): ICD-10-CM

## 2019-10-25 LAB
ALBUMIN UR-MCNC: NEGATIVE MG/DL
APPEARANCE UR: ABNORMAL
BACTERIA #/AREA URNS HPF: ABNORMAL HPF
BILIRUB UR QL STRIP: NEGATIVE
COLOR UR AUTO: YELLOW
GLUCOSE UR STRIP-MCNC: NEGATIVE MG/DL
HGB UR QL STRIP: ABNORMAL
HYALINE CASTS #/AREA URNS LPF: ABNORMAL LPF
KETONES UR STRIP-MCNC: NEGATIVE MG/DL
LEUKOCYTE ESTERASE UR QL STRIP: ABNORMAL
NITRATE UR QL: POSITIVE
PH UR STRIP: 7 [PH] (ref 5–8)
RBC #/AREA URNS AUTO: ABNORMAL HPF
SP GR UR STRIP: 1.01 (ref 1–1.03)
SQUAMOUS #/AREA URNS AUTO: ABNORMAL LPF
UROBILINOGEN UR STRIP-ACNC: ABNORMAL
WBC #/AREA URNS AUTO: ABNORMAL HPF

## 2019-10-28 ENCOUNTER — DOCUMENTATION ONLY (OUTPATIENT)
Dept: CARE COORDINATION | Facility: CLINIC | Age: 34
End: 2019-10-28

## 2019-10-28 LAB — BACTERIA SPEC CULT: ABNORMAL

## 2019-11-03 ENCOUNTER — COMMUNICATION - HEALTHEAST (OUTPATIENT)
Dept: FAMILY MEDICINE | Facility: CLINIC | Age: 34
End: 2019-11-03

## 2019-11-03 DIAGNOSIS — N39.0 ACUTE UTI (URINARY TRACT INFECTION): ICD-10-CM

## 2019-11-05 ENCOUNTER — ANCILLARY PROCEDURE (OUTPATIENT)
Dept: CT IMAGING | Facility: CLINIC | Age: 34
End: 2019-11-05
Attending: INTERNAL MEDICINE
Payer: COMMERCIAL

## 2019-11-05 DIAGNOSIS — C74.01 MALIGNANT NEOPLASM OF CORTEX OF RIGHT ADRENAL GLAND (H): ICD-10-CM

## 2019-11-05 LAB
ALBUMIN SERPL-MCNC: 2.4 G/DL (ref 3.4–5)
ALP SERPL-CCNC: 90 U/L (ref 40–150)
ALT SERPL W P-5'-P-CCNC: 18 U/L (ref 0–50)
ANION GAP SERPL CALCULATED.3IONS-SCNC: 6 MMOL/L (ref 3–14)
AST SERPL W P-5'-P-CCNC: 14 U/L (ref 0–45)
BASOPHILS # BLD AUTO: 0 10E9/L (ref 0–0.2)
BASOPHILS NFR BLD AUTO: 0.5 %
BILIRUB SERPL-MCNC: 0.2 MG/DL (ref 0.2–1.3)
BUN SERPL-MCNC: 10 MG/DL (ref 7–30)
CALCIUM SERPL-MCNC: 7.3 MG/DL (ref 8.5–10.1)
CHLORIDE SERPL-SCNC: 105 MMOL/L (ref 94–109)
CHOLEST SERPL-MCNC: 164 MG/DL
CHOLEST SERPL-MCNC: 164 MG/DL
CO2 SERPL-SCNC: 27 MMOL/L (ref 20–32)
CREAT SERPL-MCNC: 0.8 MG/DL (ref 0.52–1.04)
DIFFERENTIAL METHOD BLD: ABNORMAL
EOSINOPHIL # BLD AUTO: 0.1 10E9/L (ref 0–0.7)
EOSINOPHIL NFR BLD AUTO: 1.3 %
ERYTHROCYTE [DISTWIDTH] IN BLOOD BY AUTOMATED COUNT: 15.5 % (ref 10–15)
GFR SERPL CREATININE-BSD FRML MDRD: >90 ML/MIN/{1.73_M2}
GLUCOSE SERPL-MCNC: 89 MG/DL (ref 70–99)
HCT VFR BLD AUTO: 33.2 % (ref 35–47)
HDLC SERPL-MCNC: 84 MG/DL
HDLC SERPL-MCNC: 84 MG/DL
HGB BLD-MCNC: 9.7 G/DL (ref 11.7–15.7)
IMM GRANULOCYTES # BLD: 0 10E9/L (ref 0–0.4)
IMM GRANULOCYTES NFR BLD: 0.5 %
LDLC SERPL CALC-MCNC: 48 MG/DL
LDLC SERPL CALC-MCNC: 48 MG/DL
LYMPHOCYTES # BLD AUTO: 1 10E9/L (ref 0.8–5.3)
LYMPHOCYTES NFR BLD AUTO: 26.5 %
MCH RBC QN AUTO: 23.5 PG (ref 26.5–33)
MCHC RBC AUTO-ENTMCNC: 29.2 G/DL (ref 31.5–36.5)
MCV RBC AUTO: 81 FL (ref 78–100)
MONOCYTES # BLD AUTO: 0.4 10E9/L (ref 0–1.3)
MONOCYTES NFR BLD AUTO: 10.2 %
NEUTROPHILS # BLD AUTO: 2.3 10E9/L (ref 1.6–8.3)
NEUTROPHILS NFR BLD AUTO: 61 %
NON HDL CHOL. (LDL+VLDL): 81 MG/DL
NONHDLC SERPL-MCNC: 81 MG/DL
NRBC # BLD AUTO: 0 10*3/UL
NRBC BLD AUTO-RTO: 0 /100
PLATELET # BLD AUTO: 160 10E9/L (ref 150–450)
POTASSIUM SERPL-SCNC: 4 MMOL/L (ref 3.4–5.3)
PROT SERPL-MCNC: 5.2 G/DL (ref 6.8–8.8)
RBC # BLD AUTO: 4.12 10E12/L (ref 3.8–5.2)
SODIUM SERPL-SCNC: 137 MMOL/L (ref 133–144)
T4 FREE SERPL-MCNC: 0.98 NG/DL (ref 0.76–1.46)
TRIGL SERPL-MCNC: 164 MG/DL
TRIGLYCERIDES (HISTORICAL CONVERSION): 164 MG/DL
TSH SERPL DL<=0.005 MIU/L-ACNC: 0.11 MU/L (ref 0.4–4)
WBC # BLD AUTO: 3.7 10E9/L (ref 4–11)

## 2019-11-05 RX ORDER — SODIUM CHLORIDE 9 MG/ML
1000 INJECTION, SOLUTION INTRAVENOUS CONTINUOUS PRN
Status: CANCELLED
Start: 2019-11-25

## 2019-11-05 RX ORDER — ALBUTEROL SULFATE 0.83 MG/ML
2.5 SOLUTION RESPIRATORY (INHALATION)
Status: CANCELLED | OUTPATIENT
Start: 2019-11-25

## 2019-11-05 RX ORDER — DIPHENHYDRAMINE HYDROCHLORIDE 50 MG/ML
50 INJECTION INTRAMUSCULAR; INTRAVENOUS
Status: CANCELLED
Start: 2019-11-25

## 2019-11-05 RX ORDER — ALBUTEROL SULFATE 90 UG/1
1-2 AEROSOL, METERED RESPIRATORY (INHALATION)
Status: CANCELLED
Start: 2019-11-25

## 2019-11-05 RX ORDER — IOPAMIDOL 755 MG/ML
92 INJECTION, SOLUTION INTRAVASCULAR ONCE
Status: COMPLETED | OUTPATIENT
Start: 2019-11-05 | End: 2019-11-05

## 2019-11-05 RX ORDER — MEPERIDINE HYDROCHLORIDE 25 MG/ML
25 INJECTION INTRAMUSCULAR; INTRAVENOUS; SUBCUTANEOUS EVERY 30 MIN PRN
Status: CANCELLED | OUTPATIENT
Start: 2019-11-25

## 2019-11-05 RX ORDER — EPINEPHRINE 1 MG/ML
0.3 INJECTION, SOLUTION, CONCENTRATE INTRAVENOUS EVERY 5 MIN PRN
Status: CANCELLED | OUTPATIENT
Start: 2019-11-25

## 2019-11-05 RX ORDER — EPINEPHRINE 0.3 MG/.3ML
0.3 INJECTION SUBCUTANEOUS EVERY 5 MIN PRN
Status: CANCELLED | OUTPATIENT
Start: 2019-11-25

## 2019-11-05 RX ADMIN — IOPAMIDOL 92 ML: 755 INJECTION, SOLUTION INTRAVASCULAR at 13:39

## 2019-11-06 ENCOUNTER — ONCOLOGY VISIT (OUTPATIENT)
Dept: ONCOLOGY | Facility: CLINIC | Age: 34
End: 2019-11-06
Attending: INTERNAL MEDICINE
Payer: COMMERCIAL

## 2019-11-06 ENCOUNTER — OFFICE VISIT - HEALTHEAST (OUTPATIENT)
Dept: FAMILY MEDICINE | Facility: CLINIC | Age: 34
End: 2019-11-06

## 2019-11-06 ENCOUNTER — RECORDS - HEALTHEAST (OUTPATIENT)
Dept: ADMINISTRATIVE | Facility: OTHER | Age: 34
End: 2019-11-06

## 2019-11-06 VITALS
DIASTOLIC BLOOD PRESSURE: 79 MMHG | HEART RATE: 90 BPM | HEIGHT: 64 IN | SYSTOLIC BLOOD PRESSURE: 135 MMHG | TEMPERATURE: 97.9 F | OXYGEN SATURATION: 98 % | WEIGHT: 148.4 LBS | BODY MASS INDEX: 25.33 KG/M2

## 2019-11-06 DIAGNOSIS — Z78.9 IMPAIRED DRIVING SKILLS: ICD-10-CM

## 2019-11-06 DIAGNOSIS — C74.01 MALIGNANT NEOPLASM OF CORTEX OF RIGHT ADRENAL GLAND (H): ICD-10-CM

## 2019-11-06 DIAGNOSIS — Z00.00 ROUTINE GENERAL MEDICAL EXAMINATION AT A HEALTH CARE FACILITY: ICD-10-CM

## 2019-11-06 DIAGNOSIS — F33.2 SEVERE EPISODE OF RECURRENT MAJOR DEPRESSIVE DISORDER, WITHOUT PSYCHOTIC FEATURES (H): ICD-10-CM

## 2019-11-06 DIAGNOSIS — N30.00 ACUTE CYSTITIS WITHOUT HEMATURIA: ICD-10-CM

## 2019-11-06 DIAGNOSIS — C74.01 MALIGNANT NEOPLASM OF CORTEX OF RIGHT ADRENAL GLAND (H): Primary | ICD-10-CM

## 2019-11-06 DIAGNOSIS — N94.9 ADNEXAL CYST: ICD-10-CM

## 2019-11-06 LAB
ALBUMIN UR-MCNC: ABNORMAL MG/DL
APPEARANCE UR: ABNORMAL
BACTERIA #/AREA URNS HPF: ABNORMAL HPF
BILIRUB UR QL STRIP: ABNORMAL
COLOR UR AUTO: YELLOW
DHEA-S SERPL-MCNC: <15 UG/DL (ref 35–430)
GLUCOSE UR STRIP-MCNC: NEGATIVE MG/DL
HGB UR QL STRIP: ABNORMAL
KETONES UR STRIP-MCNC: ABNORMAL MG/DL
LEUKOCYTE ESTERASE UR QL STRIP: ABNORMAL
NITRATE UR QL: POSITIVE
PH UR STRIP: 6.5 [PH] (ref 5–8)
RBC #/AREA URNS AUTO: ABNORMAL HPF
SP GR UR STRIP: 1.02 (ref 1–1.03)
SQUAMOUS #/AREA URNS AUTO: ABNORMAL LPF
UROBILINOGEN UR STRIP-ACNC: ABNORMAL
WBC #/AREA URNS AUTO: >100 HPF

## 2019-11-06 PROCEDURE — G0463 HOSPITAL OUTPT CLINIC VISIT: HCPCS | Mod: ZF

## 2019-11-06 PROCEDURE — 99214 OFFICE O/P EST MOD 30 MIN: CPT | Mod: ZP | Performed by: INTERNAL MEDICINE

## 2019-11-06 ASSESSMENT — MIFFLIN-ST. JEOR
SCORE: 1353.46
SCORE: 1342.24

## 2019-11-06 ASSESSMENT — PAIN SCALES - GENERAL: PAINLEVEL: MILD PAIN (3)

## 2019-11-06 NOTE — NURSING NOTE
"Oncology Rooming Note    November 6, 2019 4:30 PM   Suzy Rodríguez is a 34 year old female who presents for:    Chief Complaint   Patient presents with     Oncology Clinic Visit     Return visit. Adrenal cortical cancer.      Initial Vitals: /79   Pulse 90   Temp 97.9  F (36.6  C) (Oral)   Ht 1.626 m (5' 4.02\")   Wt 67.3 kg (148 lb 6.4 oz)   SpO2 98%   BMI 25.46 kg/m   Estimated body mass index is 25.46 kg/m  as calculated from the following:    Height as of this encounter: 1.626 m (5' 4.02\").    Weight as of this encounter: 67.3 kg (148 lb 6.4 oz). Body surface area is 1.74 meters squared.  Mild Pain (3) Comment: Data Unavailable   No LMP recorded. (Menstrual status: IUD).  Allergies reviewed: Yes  Medications reviewed: Yes    Medications: MEDICATION REFILLS NEEDED TODAY. Provider was notified.  Pharmacy name entered into [facility]: [facility] [place]  [place], [place] - [address]    Clinical concerns: Refills on Gabapentin, Fluoxetine, Bupropion. Dr. Cintron was notified.      Antonietta Hernandez, [facility]              "

## 2019-11-06 NOTE — LETTER
11/6/2019       RE: Suzy Rodríguez  [address]  [facility] [id number]     Dear Colleague,    Thank you for referring your patient, Suzy Rodríguez, to the [facility]. Please see a copy of my visit note below.    MEDICAL ONCOLOGY CLINIC NOTE    REFERRING PROVIDER: Warren Mansfield MD from [facility] Urologic Oncology clinic.     REASON FOR CURRENT VISIT: Follow-up while on mitotane as adjuvant therapy of adrenocortical carcinoma.      HISTORY OF PRESENT ILLNESS: Ms. Rodríguez is a 33-year-old lady with history notable for right-sided functioning adrenocortical carcinoma (diagnosed in May 2018), who is here for follow-up while on adjuvant mitotane. Her oncologic history is detailed as under.     Suzy is doing better since the last visit. She had developed significant, severe fatigue, slurred speech, muscle pain, insomnia, and tremors since being on the higher dose of mitotane 1500mg AM - 1500mg afternoon and 1000mg HS. These has improved since dropping the dose to 1000mg BID and giving a brief treatment interruption. No longer has slurred speech and non-intentional low-amplitude tremors have significantly improved. Also on hydrocortisone 30mg QAM, 20 early PM. No recurrence of episode of dizziness/LOC. No history of CVA/TIA/palpitations/CP/SOA etc. Using Compazine 10mg BID for chronic, mitotane-associated nausea. No diarrhea/abd pain/bloody BM. She has 1-2 lose BMs a day and uses Imodium PRN. Using trazodone for sleep sometimes.     Last visit with Dr. Hilton at [facility] was on 9/24/19 and mitotane level came back at 18.8. She also follows with Dr. Veena Jaquez in our endocrinology department.    No clinical evidence of disease recurrence.     ONCOLOGIC HISTORY:   1. 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   -  "CT abdomen and pelvis stone protocol without contrast 5 1372 showed \"9.2 x 8 cm heterogeneous right upper quadrant mass with small foci of calcification within it which is likely arising from the adrenal gland though inseparable from liver and upper pole of right kidney. It is incompletely evaluated on this noncontrast study. 3 x 2.6 cm mass right lobe of liver on image #85 also incompletely evaluated. 6 x 4 x 4 mm upper third left ureteral obstructing stone with mild to moderate secondary hydronephrosis. Collection of stones at lower pole left kidney extending over an area of approximately 6 x 7 x 4 mm. Additional nonobstructing 1 mm stone upper pole left kidney. 2 mm nonobstructing stone noted upper pole right kidney with no hydronephrosis on the right. Postop change consistent with gastric bypass. Noncontrast images of spleen, left adrenal gland, gallbladder, and pancreas unremarkable. No aneurysm. No adenopathy.\"  - Seen by Dr. Delvalle at [facility] clinic. Referred to Dr. Alvino Patel and then Dr. Warren Mansfield.  - Endocrinology team directed workup showed high DHEAS level of 740 pre-surgery.   - Right open adrenalectomy and hepatic mobilization performed by Dr. Warren Mansfield on 6/25/2018. Pathology showed adrenocortical carcinoma measuring 11.3 cm, weighing 413 g with extension into or through the adrenal capsule negative lymphovascular invasion, tumor necrosis present, margins involved by tumor, no regional lymph nodes identified, pathologic stage T2 NX.  - DHEAS normalized post-surgery.   - Adjuvant Mitotane started on 7/24/18. Dose increased to 2000mg TID around 10/23/18 due to persistently low troughs. Held 1/16/19 for two weeks and resumed 1/30 at 1000 mg po BID due to very poor tolerance of higher doses. Highest mitotane level was 10.2 on 2/11/19. Was taking 1500 AM-1500 afternoon-1000mg PM June-Aug 2019, but unable to tolerate. Dose reduced to 1000mg TID as of 08/14/19. Dose " "interrupted on 9/25/19 by Dr. Hilton due to severe side effects. Plan to resume therapy at 1000mg BID in mid Oct.  - Saw radiation oncology at [facility], radiation oncology at [facility], as well as endocrine oncology (Dr. Hilton) at [facility].   - S/p adjuvant RT by Dr. Monsivais - 5040 cGy over 30 fr (8/24/18-10/6/18).  - 2/11/19: CT C/A/P and MRI brain with contrast - no evidence of disease recurrence.  - 05/07/19: CT C/A/P with contrast - \"1. No evidence for locally recurrent disease. 2. No evidence for metastatic disease in the chest, abdomen, or pelvis. 3. A 2.5 x 2.2 cm peripherally enhancing mass in hepatic segment 5, unchanged enhancing likely hemangioma given the peripheral discontinuous enhancement on previous examination. No other suspicious liver lesions.\"  - 08/14/19: CT C/A/P with contrast - AFUA.  - 11/5/19: CT C/A/P with contrast - \"1. New tiny (2mm) right upper lobe pulmonary nodule of questionable clinical significance. Recommend further attention to this at imaging follow-up.  2. No other new disease identified. 3. New large left ovarian cyst. Resolution of the prominent right ovarian cyst described on the prior exam.\"    REVIEW OF SYSTEMS: 14 point ROS negative other than the symptoms noted above in the HPI.    PAST MEDICAL AND SURGICAL HISTORY: Reviewed today.  1. Right-sided adrenocortical carcinoma.  2. MHTFR mutation with h/o mutiple miscarriages.  3. Ovarian cysts diagnosed in 2011.  4. H/o gastric bypass in 2016 for obesity.    SOCIAL HISTORY:   Social History     Tobacco Use     Smoking status: Never Smoker     Smokeless tobacco: Never Used   Substance Use Topics     Alcohol use: Yes     Comment: occ.     Drug use: No     FAMILY HISTORY:   No clustering of malignancies.    ALLERGIES:   Allergies   Allergen Reactions     Bee Venom Swelling     Ibuprofen Hives, Swelling and Other (See Comments)     CURRENT MEDICATIONS:   Current Outpatient Medications:      " acetaminophen (TYLENOL) 325 MG tablet, Take 2 tablets (650 mg) by mouth every 4 hours as needed for other (multimodal surgical pain management along with NSAIDS and opioid medication as indicated based on pain control and physical function.), Disp: 150 tablet, Rfl: 0     buPROPion (WELLBUTRIN) 100 MG tablet, TAKE 1 TABLET (100 MG TOTAL) BY MOUTH 2 (TWO) TIMES A DAY., Disp: , Rfl: 3     calcium carbonate antacid 1000 MG CHEW, Take 1 tablet by mouth every morning , Disp: , Rfl:      cholecalciferol (VITAMIN D-1000 MAX ST) 1000 units TABS, Take 2,000 Units by mouth every morning , Disp: , Rfl:      cyclobenzaprine (FLEXERIL) 5 MG tablet, Take 5-10 mg by mouth, Disp: , Rfl:      diazepam (VALIUM) 5 MG tablet, Take 1 tablet (5 mg) by mouth once as needed for anxiety (MRI claustrophobia management), Disp: 2 tablet, Rfl: 3     diphenoxylate-atropine (LOMOTIL) 2.5-0.025 MG tablet, Take 1 tablet by mouth 3 times daily as needed for diarrhea, Disp: 90 tablet, Rfl: 3     EPINEPHrine (EPIPEN/ADRENACLICK/OR ANY BX GENERIC EQUIV) 0.3 MG/0.3ML injection 2-pack, As directed for bee stings, Disp: , Rfl:      ferrous sulfate (IRON) 325 (65 Fe) MG tablet, Take 325 mg by mouth daily, Disp: , Rfl:      fludrocortisone (FLORINEF) 0.1 MG tablet, Take 0.5 tablets (0.05 mg) by mouth daily (Patient taking differently: Take 0.1 mg by mouth daily ), Disp: 45 tablet, Rfl: 3     FLUoxetine (PROZAC) 40 MG capsule, Take 40 mg by mouth daily, Disp: , Rfl:      gabapentin (NEURONTIN) 300 MG capsule, Take 300 mg by mouth 3 times daily, Disp: , Rfl:      hydrocortisone (CORTEF) 10 MG tablet, Take 3 pills (=30 mg) in AM and 2 pills (=20 mg) at 2 pm daily, additional as directed., Disp: 180 tablet, Rfl: 11     levothyroxine (SYNTHROID/LEVOTHROID) 125 MCG tablet, Take 1 tablet (125 mcg) by mouth daily, Disp: 90 tablet, Rfl: 3     mitotane (LYSODREN) 500 MG tablet CHEMO, Take 2 tablets (1,000 mg) by mouth 3 times daily, Disp: 240 tablet, Rfl: 0      "Multiple Vitamins-Calcium (ONE-A-DAY WOMENS PO), Take 2 tablets by mouth every morning , Disp: , Rfl:      pantoprazole (PROTONIX) 40 MG EC tablet, Take 1 tablet (40 mg) by mouth daily, Disp: 30 tablet, Rfl: 0     prochlorperazine (COMPAZINE) 5 MG tablet, Take 1 tablet (5 mg) by mouth every 8 hours as needed for nausea or vomiting, Disp: 90 tablet, Rfl: 3     traZODone (DESYREL) 50 MG tablet, Take 2 tablets (100 mg) by mouth nightly as needed for sleep, Disp: 60 tablet, Rfl: 0     UNABLE TO FIND, medical cannabis (Patient's own supply. Not a prescription), Disp: , Rfl:      hydrocortisone sodium succinate PF (SOLU-CORTEF) 100 MG injection, Inject 2 mLs (100 mg) into the muscle once for 1 dose Use in emergency situations as discussed in clinic. (Patient not taking: Reported on 8/14/2019), Disp: 2 mL, Rfl: 1    PHYSICAL EXAMINATION:  Vital signs: /79   Pulse 90   Temp 97.9  F (36.6  C) (Oral)   Ht 1.626 m (5' 4.02\")   Wt 67.3 kg (148 lb 6.4 oz)   SpO2 98%   BMI 25.46 kg/m     ECOG performance status of 2.   GENERAL: Young lady, sitting in chair. Appears tremulous and fatigued, but in no acute distress.  HEENT: No icterus, no pallor. MMM, PERRL, OP clear.   NECK: Supple, no JVD/LAD.  LUNGS: CTAB, normal WOB on RA.  CARDIOVASCULAR: Regular rate and rhythm, no murmurs.   ABDOMEN: Well-healing posterior right sided abdominal incision without concerning findings. Soft, NT, ND, no masses appreciated, normal BS.   EXTREMITIES: No cyanosis, no clubbing, no edema.   NEUROLOGIC: Alert and fully oriented. Hands and feet are tremulous.   PSYCH: Affect flat, mood slightly anxious/depressed.    LABORATORY DATA:   Lab Test 11/05/19  1349 08/14/19  1050 07/17/19  1241   WBC 3.7* 4.8 5.5   RBC 4.12 4.65 4.86   HGB 9.7* 11.4* 12.0   HCT 33.2* 37.9 39.4   MCV 81 82 81   MCH 23.5* 24.5* 24.7*   MCHC 29.2* 30.1* 30.5*   RDW 15.5* 14.8 14.9    244 307   NEUTROPHIL 61.0 67.3 84.0    140 137   POTASSIUM 4.0 3.6 3.5 "   CHLORIDE 105 106 104   CO2 27 28 27   ANIONGAP 6 5 6   GLC 89 85 82   BUN 10 10 7   CR 0.80 0.72 0.71   SHASHA 7.3* 7.8* 8.0*   PROTTOTAL 5.2* 6.0* 6.7*   ALBUMIN 2.4* 2.9* 3.4   BILITOTAL 0.2 0.2 0.3   ALKPHOS 90 110 133   AST 14 16 24   ALT 18 25 28     Lab Test 11/05/19  1349 08/14/19  1050   CHOL 164 180   HDL 84 99   LDL 48 48   TRIG 164* 165*     Lab Test 11/05/19  1349 08/14/19  1050 07/17/19  1241 06/18/19  1230 05/08/19  1013   TSH 0.11* 0.04* 0.04* 0.09* 0.27*   T4 0.98 0.92 1.14 1.00 0.98     DHEAS was 740 on 6/5/18, and has been <15 on 7/12/18, 8/20/18, 9/19/18, 10/23/18, 11/27/18, 1/9/19, 5/8/19, 6/18/19, 7/17/19, 11/15/19.  Mitotane level (target 14-20): 1.8ng/dl on 8/20/18, 3.5 on 10/25/18, 6.2 on 12/4/18, 8.1 on 1/9/19 and 10.2 on 2/11/19, 14.2 on 6/18/19. Pending from today.  Labs from [facility] 9/24/19 - WBC 4200, ANC 2600, Hb 10.4, Platelets 218K, lytes WNL, creat 0.61, Calcium 8, albumin 3.5, LFTs normal, mitotane level 18.8, cortisol 25.8, ACTH<5, aldosterone 4.9, free T4 1.17, TSH 0.07, DHEAS <15, renin plasma 10.6.     IMAGING STUDIES:  As above.    ASSESSMENT AND PLAN: A 33-year-old lady with right-sided functioning adrenocortical carcinoma, who is here for follow-up while on adjuvant mitotane.     Adrenocortical carcinoma:  - Started on adjuvant mitotane in July 2018 based on her presentation and tumor characteristics including invasion of the adrenal capsule, high mitotic rate, possibly positive margins, and high Ki67, which could result in a rate of recurrence as high as 40%.    - Restaging CT C/A/P from today continues to show no evidence of disease recurrence. Patient was quite anxious with the way that radiology report commented on the 2mm lung nodule. I spent considerable time explaining that 2mm is decidedly indeterminate and likely non-cancerous and that we will monitor closely.   - Despite multiple supportive care interventions, patient has NOT been able to tolerate higher doses (above  "2-3gm/day) of mitotane. Now doing better on 1000mg BID.   - We've had numerous conversations regarding goals of therapy and that mitotane doesn't work on an \"all or none\" basis with no activity below therapeutic level. In her case, it is critical to not aim for a number but to actually aim for optimizing tolerance to therapy to allow continuation for another 4 years. Suzy and her  are in agreement.   - Monthly Mitotane level, CBC, comprehensive panel, TSH, FT4, cholesterol panel, 24-hour Urine Free Cortisol, and DHEAS.   - Plan to continue adjuvant Mitotane for up to 5 yrs if tolerated.  - Next restaging CT C/A/P in 3 months.    Concern for ativan use disorder:  - I counseled her of the addictive potential of Ativan and she did agree that she has felt increasingly desiring this medication.   - We have stopped Ativan and I would encourage other medical providers to not give any further refills.  - Discussed health coping strategies including cognitive behavioral interventions. Seen by our psychology team as well.     Insomnia:  - Trazodone is working well but she's taking 100mg at bedtime. Will give refills PRN.   - EKG with QTc 488 ms in April.    Endocrinopathies:  - Mgmt per endocrine team at the . Follow-up planned in the next couple months.    Multifocal macroovarian cysts:  - These can happen with mitotane and are likely not cancerous.   - Slated to see Gyn next week.    Return to see me in 6 weeks with labs and CT chest. Patient and family given opportunity to ask questions, which were answered to their satisfaction. They're in complete agreement as planned.    BILLIN.     Benson Cintron M.D.  . Professor of Medicine  Genitourinary Oncology  Division of Hematology, Oncology & Transplantation  [facility]    "

## 2019-11-07 ENCOUNTER — HEALTH MAINTENANCE LETTER (OUTPATIENT)
Age: 34
End: 2019-11-07

## 2019-11-07 LAB — MISCELLANEOUS TEST: NORMAL

## 2019-11-08 LAB — BACTERIA SPEC CULT: ABNORMAL

## 2019-11-11 ASSESSMENT — PATIENT HEALTH QUESTIONNAIRE - PHQ9: SUM OF ALL RESPONSES TO PHQ QUESTIONS 1-9: 20

## 2019-11-11 NOTE — PROGRESS NOTES
"MEDICAL ONCOLOGY CLINIC NOTE    REFERRING PROVIDER: Warren Mansfield MD from [facility] Urologic Oncology clinic.     REASON FOR CURRENT VISIT: Follow-up while on mitotane as adjuvant therapy of adrenocortical carcinoma.      HISTORY OF PRESENT ILLNESS: Ms. Rodríguez is a 33-year-old lady with history notable for right-sided functioning adrenocortical carcinoma (diagnosed in May 2018), who is here for follow-up while on adjuvant mitotane. Her oncologic history is detailed as under.     Suzy is doing better since the last visit. She had developed significant, severe fatigue, slurred speech, muscle pain, insomnia, and tremors since being on the higher dose of mitotane 1500mg AM - 1500mg afternoon and 1000mg HS. These has improved since dropping the dose to 1000mg BID and giving a brief treatment interruption. No longer has slurred speech and non-intentional low-amplitude tremors have significantly improved. Also on hydrocortisone 30mg QAM, 20 early PM. No recurrence of episode of dizziness/LOC. No history of CVA/TIA/palpitations/CP/SOA etc. Using Compazine 10mg BID for chronic, mitotane-associated nausea. No diarrhea/abd pain/bloody BM. She has 1-2 lose BMs a day and uses Imodium PRN. Using trazodone for sleep sometimes.     Last visit with Dr. Hilton at [facility] was on 9/24/19 and mitotane level came back at 18.8. She also follows with Dr. Veena Jaquez in our endocrinology department.    No clinical evidence of disease recurrence.     ONCOLOGIC HISTORY:   1. 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   - CT abdomen and pelvis stone protocol without contrast 5 7153 showed \"9.2 x 8 cm heterogeneous right upper quadrant mass with small foci of calcification within it which is likely arising from the adrenal gland though inseparable from " "liver and upper pole of right kidney. It is incompletely evaluated on this noncontrast study. 3 x 2.6 cm mass right lobe of liver on image #85 also incompletely evaluated. 6 x 4 x 4 mm upper third left ureteral obstructing stone with mild to moderate secondary hydronephrosis. Collection of stones at lower pole left kidney extending over an area of approximately 6 x 7 x 4 mm. Additional nonobstructing 1 mm stone upper pole left kidney. 2 mm nonobstructing stone noted upper pole right kidney with no hydronephrosis on the right. Postop change consistent with gastric bypass. Noncontrast images of spleen, left adrenal gland, gallbladder, and pancreas unremarkable. No aneurysm. No adenopathy.\"  - Seen by Dr. Delvalle at [facility] Urology clinic. Referred to Dr. Alvino Patel and then Dr. Warren Mansfield.  - Endocrinology team directed workup showed high DHEAS level of 740 pre-surgery.   - Right open adrenalectomy and hepatic mobilization performed by Dr. Warren Mansfield on 6/25/2018. Pathology showed adrenocortical carcinoma measuring 11.3 cm, weighing 413 g with extension into or through the adrenal capsule negative lymphovascular invasion, tumor necrosis present, margins involved by tumor, no regional lymph nodes identified, pathologic stage T2 NX.  - DHEAS normalized post-surgery.   - Adjuvant Mitotane started on 7/24/18. Dose increased to 2000mg TID around 10/23/18 due to persistently low troughs. Held 1/16/19 for two weeks and resumed 1/30 at 1000 mg po BID due to very poor tolerance of higher doses. Highest mitotane level was 10.2 on 2/11/19. Was taking 1500 AM-1500 afternoon-1000mg PM June-Aug 2019, but unable to tolerate. Dose reduced to 1000mg TID as of 08/14/19. Dose interrupted on 9/25/19 by Dr. Hilton due to severe side effects. Plan to resume therapy at 1000mg BID in mid Oct.  - Saw radiation oncology at [facility], radiation oncology at [facility], as well as endocrine " "oncology (Dr. Hilton) at [facility].   - S/p adjuvant RT by Dr. Monsivais - 5040 cGy over 30 fr (8/24/18-10/6/18).  - 2/11/19: CT C/A/P and MRI brain with contrast - no evidence of disease recurrence.  - 05/07/19: CT C/A/P with contrast - \"1. No evidence for locally recurrent disease. 2. No evidence for metastatic disease in the chest, abdomen, or pelvis. 3. A 2.5 x 2.2 cm peripherally enhancing mass in hepatic segment 5, unchanged enhancing likely hemangioma given the peripheral discontinuous enhancement on previous examination. No other suspicious liver lesions.\"  - 08/14/19: CT C/A/P with contrast - AFUA.  - 11/5/19: CT C/A/P with contrast - \"1. New tiny (2mm) right upper lobe pulmonary nodule of questionable clinical significance. Recommend further attention to this at imaging follow-up.  2. No other new disease identified. 3. New large left ovarian cyst. Resolution of the prominent right ovarian cyst described on the prior exam.\"    REVIEW OF SYSTEMS: 14 point ROS negative other than the symptoms noted above in the HPI.    PAST MEDICAL AND SURGICAL HISTORY: Reviewed today.  1. Right-sided adrenocortical carcinoma.  2. MHTFR mutation with h/o mutiple miscarriages.  3. Ovarian cysts diagnosed in 2011.  4. H/o gastric bypass in 2016 for obesity.    SOCIAL HISTORY:   Social History     Tobacco Use     Smoking status: Never Smoker     Smokeless tobacco: Never Used   Substance Use Topics     Alcohol use: Yes     Comment: occ.     Drug use: No     FAMILY HISTORY:   No clustering of malignancies.    ALLERGIES:   Allergies   Allergen Reactions     Bee Venom Swelling     Ibuprofen Hives, Swelling and Other (See Comments)     CURRENT MEDICATIONS:   Current Outpatient Medications:      acetaminophen (TYLENOL) 325 MG tablet, Take 2 tablets (650 mg) by mouth every 4 hours as needed for other (multimodal surgical pain management along with NSAIDS and opioid medication as indicated based on pain control and physical " function.), Disp: 150 tablet, Rfl: 0     buPROPion (WELLBUTRIN) 100 MG tablet, TAKE 1 TABLET (100 MG TOTAL) BY MOUTH 2 (TWO) TIMES A DAY., Disp: , Rfl: 3     calcium carbonate antacid 1000 MG CHEW, Take 1 tablet by mouth every morning , Disp: , Rfl:      cholecalciferol (VITAMIN D-1000 MAX ST) 1000 units TABS, Take 2,000 Units by mouth every morning , Disp: , Rfl:      cyclobenzaprine (FLEXERIL) 5 MG tablet, Take 5-10 mg by mouth, Disp: , Rfl:      diazepam (VALIUM) 5 MG tablet, Take 1 tablet (5 mg) by mouth once as needed for anxiety (MRI claustrophobia management), Disp: 2 tablet, Rfl: 3     diphenoxylate-atropine (LOMOTIL) 2.5-0.025 MG tablet, Take 1 tablet by mouth 3 times daily as needed for diarrhea, Disp: 90 tablet, Rfl: 3     EPINEPHrine (EPIPEN/ADRENACLICK/OR ANY BX GENERIC EQUIV) 0.3 MG/0.3ML injection 2-pack, As directed for bee stings, Disp: , Rfl:      ferrous sulfate (IRON) 325 (65 Fe) MG tablet, Take 325 mg by mouth daily, Disp: , Rfl:      fludrocortisone (FLORINEF) 0.1 MG tablet, Take 0.5 tablets (0.05 mg) by mouth daily (Patient taking differently: Take 0.1 mg by mouth daily ), Disp: 45 tablet, Rfl: 3     FLUoxetine (PROZAC) 40 MG capsule, Take 40 mg by mouth daily, Disp: , Rfl:      gabapentin (NEURONTIN) 300 MG capsule, Take 300 mg by mouth 3 times daily, Disp: , Rfl:      hydrocortisone (CORTEF) 10 MG tablet, Take 3 pills (=30 mg) in AM and 2 pills (=20 mg) at 2 pm daily, additional as directed., Disp: 180 tablet, Rfl: 11     levothyroxine (SYNTHROID/LEVOTHROID) 125 MCG tablet, Take 1 tablet (125 mcg) by mouth daily, Disp: 90 tablet, Rfl: 3     mitotane (LYSODREN) 500 MG tablet CHEMO, Take 2 tablets (1,000 mg) by mouth 3 times daily, Disp: 240 tablet, Rfl: 0     Multiple Vitamins-Calcium (ONE-A-DAY WOMENS PO), Take 2 tablets by mouth every morning , Disp: , Rfl:      pantoprazole (PROTONIX) 40 MG EC tablet, Take 1 tablet (40 mg) by mouth daily, Disp: 30 tablet, Rfl: 0     prochlorperazine  "(COMPAZINE) 5 MG tablet, Take 1 tablet (5 mg) by mouth every 8 hours as needed for nausea or vomiting, Disp: 90 tablet, Rfl: 3     traZODone (DESYREL) 50 MG tablet, Take 2 tablets (100 mg) by mouth nightly as needed for sleep, Disp: 60 tablet, Rfl: 0     UNABLE TO FIND, medical cannabis (Patient's own supply. Not a prescription), Disp: , Rfl:      hydrocortisone sodium succinate PF (SOLU-CORTEF) 100 MG injection, Inject 2 mLs (100 mg) into the muscle once for 1 dose Use in emergency situations as discussed in clinic. (Patient not taking: Reported on 8/14/2019), Disp: 2 mL, Rfl: 1    PHYSICAL EXAMINATION:  Vital signs: /79   Pulse 90   Temp 97.9  F (36.6  C) (Oral)   Ht 1.626 m (5' 4.02\")   Wt 67.3 kg (148 lb 6.4 oz)   SpO2 98%   BMI 25.46 kg/m    ECOG performance status of 2.   GENERAL: Young lady, sitting in chair. Appears tremulous and fatigued, but in no acute distress.  HEENT: No icterus, no pallor. MMM, PERRL, OP clear.   NECK: Supple, no JVD/LAD.  LUNGS: CTAB, normal WOB on RA.  CARDIOVASCULAR: Regular rate and rhythm, no murmurs.   ABDOMEN: Well-healing posterior right sided abdominal incision without concerning findings. Soft, NT, ND, no masses appreciated, normal BS.   EXTREMITIES: No cyanosis, no clubbing, no edema.   NEUROLOGIC: Alert and fully oriented. Hands and feet are tremulous.   PSYCH: Affect flat, mood slightly anxious/depressed.    LABORATORY DATA:   Lab Test 11/05/19  1349 08/14/19  1050 07/17/19  1241   WBC 3.7* 4.8 5.5   RBC 4.12 4.65 4.86   HGB 9.7* 11.4* 12.0   HCT 33.2* 37.9 39.4   MCV 81 82 81   MCH 23.5* 24.5* 24.7*   MCHC 29.2* 30.1* 30.5*   RDW 15.5* 14.8 14.9    244 307   NEUTROPHIL 61.0 67.3 84.0    140 137   POTASSIUM 4.0 3.6 3.5   CHLORIDE 105 106 104   CO2 27 28 27   ANIONGAP 6 5 6   GLC 89 85 82   BUN 10 10 7   CR 0.80 0.72 0.71   SHASHA 7.3* 7.8* 8.0*   PROTTOTAL 5.2* 6.0* 6.7*   ALBUMIN 2.4* 2.9* 3.4   BILITOTAL 0.2 0.2 0.3   ALKPHOS 90 110 133   AST 14 16 " "24   ALT 18 25 28     Lab Test 11/05/19  1349 08/14/19  1050   CHOL 164 180   HDL 84 99   LDL 48 48   TRIG 164* 165*     Lab Test 11/05/19  1349 08/14/19  1050 07/17/19  1241 06/18/19  1230 05/08/19  1013   TSH 0.11* 0.04* 0.04* 0.09* 0.27*   T4 0.98 0.92 1.14 1.00 0.98     DHEAS was 740 on 6/5/18, and has been <15 on 7/12/18, 8/20/18, 9/19/18, 10/23/18, 11/27/18, 1/9/19, 5/8/19, 6/18/19, 7/17/19, 11/15/19.  Mitotane level (target 14-20): 1.8ng/dl on 8/20/18, 3.5 on 10/25/18, 6.2 on 12/4/18, 8.1 on 1/9/19 and 10.2 on 2/11/19, 14.2 on 6/18/19. Pending from today.  Labs from [facility] 9/24/19 - WBC 4200, ANC 2600, Hb 10.4, Platelets 218K, lytes WNL, creat 0.61, Calcium 8, albumin 3.5, LFTs normal, mitotane level 18.8, cortisol 25.8, ACTH<5, aldosterone 4.9, free T4 1.17, TSH 0.07, DHEAS <15, renin plasma 10.6.     IMAGING STUDIES:  As above.    ASSESSMENT AND PLAN: A 33-year-old lady with right-sided functioning adrenocortical carcinoma, who is here for follow-up while on adjuvant mitotane.     Adrenocortical carcinoma:  - Started on adjuvant mitotane in July 2018 based on her presentation and tumor characteristics including invasion of the adrenal capsule, high mitotic rate, possibly positive margins, and high Ki67, which could result in a rate of recurrence as high as 40%.    - Restaging CT C/A/P from today continues to show no evidence of disease recurrence. Patient was quite anxious with the way that radiology report commented on the 2mm lung nodule. I spent considerable time explaining that 2mm is decidedly indeterminate and likely non-cancerous and that we will monitor closely.   - Despite multiple supportive care interventions, patient has NOT been able to tolerate higher doses (above 2-3gm/day) of mitotane. Now doing better on 1000mg BID.   - We've had numerous conversations regarding goals of therapy and that mitotane doesn't work on an \"all or none\" basis with no activity below therapeutic level. In her case, it " is critical to not aim for a number but to actually aim for optimizing tolerance to therapy to allow continuation for another 4 years. Suzy and her  are in agreement.   - Monthly Mitotane level, CBC, comprehensive panel, TSH, FT4, cholesterol panel, 24-hour Urine Free Cortisol, and DHEAS.   - Plan to continue adjuvant Mitotane for up to 5 yrs if tolerated.  - Next restaging CT C/A/P in 3 months.    Concern for ativan use disorder:  - I counseled her of the addictive potential of Ativan and she did agree that she has felt increasingly desiring this medication.   - We have stopped Ativan and I would encourage other medical providers to not give any further refills.  - Discussed health coping strategies including cognitive behavioral interventions. Seen by our psychology team as well.     Insomnia:  - Trazodone is working well but she's taking 100mg at bedtime. Will give refills PRN.   - EKG with QTc 488 ms in April.    Endocrinopathies:  - Mgmt per endocrine team at the . Follow-up planned in the next couple months.    Multifocal macroovarian cysts:  - These can happen with mitotane and are likely not cancerous.   - Slated to see Gyn next week.    Return to see me in 6 weeks with labs and CT chest. Patient and family given opportunity to ask questions, which were answered to their satisfaction. They're in complete agreement as planned.    BILLIN.     Benson Cintron M.D.  . Professor of Medicine  Genitourinary Oncology  Division of Hematology, Oncology & Transplantation  [facility]

## 2019-11-13 LAB — LAB SCANNED RESULT: NORMAL

## 2019-11-14 ENCOUNTER — OFFICE VISIT (OUTPATIENT)
Dept: OBGYN | Facility: CLINIC | Age: 34
End: 2019-11-14
Payer: COMMERCIAL

## 2019-11-14 ENCOUNTER — COMMUNICATION - HEALTHEAST (OUTPATIENT)
Dept: FAMILY MEDICINE | Facility: CLINIC | Age: 34
End: 2019-11-14

## 2019-11-14 VITALS
WEIGHT: 145 LBS | BODY MASS INDEX: 24.75 KG/M2 | HEART RATE: 89 BPM | DIASTOLIC BLOOD PRESSURE: 78 MMHG | SYSTOLIC BLOOD PRESSURE: 123 MMHG | HEIGHT: 64 IN

## 2019-11-14 DIAGNOSIS — Z13.9 SCREENING FOR CONDITION: ICD-10-CM

## 2019-11-14 DIAGNOSIS — N83.202 CYST OF LEFT OVARY: Primary | ICD-10-CM

## 2019-11-14 DIAGNOSIS — F41.9 ANXIETY: ICD-10-CM

## 2019-11-14 LAB — HIV 1&2 EXT: NORMAL

## 2019-11-14 PROCEDURE — 36415 COLL VENOUS BLD VENIPUNCTURE: CPT | Performed by: OBSTETRICS & GYNECOLOGY

## 2019-11-14 PROCEDURE — 86803 HEPATITIS C AB TEST: CPT | Performed by: OBSTETRICS & GYNECOLOGY

## 2019-11-14 PROCEDURE — 87340 HEPATITIS B SURFACE AG IA: CPT | Performed by: OBSTETRICS & GYNECOLOGY

## 2019-11-14 PROCEDURE — 87591 N.GONORRHOEAE DNA AMP PROB: CPT | Performed by: OBSTETRICS & GYNECOLOGY

## 2019-11-14 PROCEDURE — 87389 HIV-1 AG W/HIV-1&-2 AB AG IA: CPT | Performed by: OBSTETRICS & GYNECOLOGY

## 2019-11-14 PROCEDURE — 86780 TREPONEMA PALLIDUM: CPT | Performed by: OBSTETRICS & GYNECOLOGY

## 2019-11-14 PROCEDURE — 87210 SMEAR WET MOUNT SALINE/INK: CPT | Performed by: OBSTETRICS & GYNECOLOGY

## 2019-11-14 PROCEDURE — 86706 HEP B SURFACE ANTIBODY: CPT | Performed by: OBSTETRICS & GYNECOLOGY

## 2019-11-14 PROCEDURE — 87491 CHLMYD TRACH DNA AMP PROBE: CPT | Performed by: OBSTETRICS & GYNECOLOGY

## 2019-11-14 ASSESSMENT — MIFFLIN-ST. JEOR: SCORE: 1337.72

## 2019-11-14 ASSESSMENT — ANXIETY QUESTIONNAIRES
7. FEELING AFRAID AS IF SOMETHING AWFUL MIGHT HAPPEN: NEARLY EVERY DAY
2. NOT BEING ABLE TO STOP OR CONTROL WORRYING: MORE THAN HALF THE DAYS
5. BEING SO RESTLESS THAT IT IS HARD TO SIT STILL: NEARLY EVERY DAY
GAD7 TOTAL SCORE: 17
6. BECOMING EASILY ANNOYED OR IRRITABLE: MORE THAN HALF THE DAYS
1. FEELING NERVOUS, ANXIOUS, OR ON EDGE: MORE THAN HALF THE DAYS
3. WORRYING TOO MUCH ABOUT DIFFERENT THINGS: MORE THAN HALF THE DAYS

## 2019-11-14 ASSESSMENT — PAIN SCALES - GENERAL: PAINLEVEL: NO PAIN (0)

## 2019-11-14 ASSESSMENT — PATIENT HEALTH QUESTIONNAIRE - PHQ9
SUM OF ALL RESPONSES TO PHQ QUESTIONS 1-9: 18
5. POOR APPETITE OR OVEREATING: NEARLY EVERY DAY

## 2019-11-14 NOTE — PATIENT INSTRUCTIONS
We will call with results of testing from today     Please make an appointment for 3 months for follow up ultrasound and visit

## 2019-11-14 NOTE — PROGRESS NOTES
[facility]  Gynecology Visit    Reason for Consult: Ovarian cysts  Consulting Provider: Dr Cintron    HPI:    Suzy Rodríguez is a 34 year old, here for consultation regarding ovarian cysts. She is currently being treated with Mitotane due to dx of functioning adrenocortical carcinoma. She is followed by oncology at [facility]. Due to persistent ovarian cysts it was recommended she follow up with Gynecology.     Pt denies any abdominal pain, constipation/diarrhea, urinary symptoms such as dysuria, frequency and urgency. No nausea/vomiting or difficulty. Eating. She believes she can feel when she ovulates, which is somewhat uncomfortable, but otherwise is asymptomatic and came to follow up per her Oncologist recommendation.     Of note, literature suggests that ovarian cysts are a common side effect of Mitotane as it affects the Hypothalamic-pituitary axis.     Pt does note that she has occasional dyspareunia upon insertion during penetrative intercourse    GYN History  - LMP: Patient's last menstrual period was 11/13/2019.  - Menses: irregular spotting, has Mirena in place  - Pap Smears: 10/8/15, NILM, HPV neg - next due 2020  - Contraception: Mirena  - Sexual Activity/Concerns: active with male partners  - Hx STIs/UTIs: Notes she recently had a new partner a few months ago. Denies vaginal discharge or symptom change but would like STI testing today.    OBHx  OB History   No obstetric history on file.       PMHx:   functioning adrenocortical carcinoma, adrenal insufficiency  Hx of PE 2009  MTHFR mutation    PSHx: S/p Tanika-en-y Gastric Bypass, c/s x2, adrenalectomy    Meds: Wellbutrin, VitD, Lomotil, fludrocortisone, hydrocortisone, gabapentin, levothyroxine, protonix    Allergies:  denies    SocHx: Not currently working due to oncology treatment. Kids go to school, trying to work with therapist as very much does not like being at home. Very isolating    FamHx:  noncontributory    ROS: 10-Point ROS negative except as  "noted in HPI    Physical Exam  /78   Pulse 89   Ht 1.626 m (5' 4\")   Wt 65.8 kg (145 lb)   LMP 11/13/2019   Breastfeeding No   BMI 24.89 kg/m    Gen: Well-appearing, NAD. Visible whole-body shaking (due to medications)  HEENT: Normocephalic, atraumatic  Abd: Soft, non-tender, non-distended  Ext: No LE edema, extremities warm and well perfused    Pelvic:  Normal appearing external female genitalia. Normal hair distribution. Vagina is without lesions. Minimal physiologic discharge. Cervix without lesions, no cervical motion tenderness. Uterus is small, mobile, non-tender. Fullness in left adnexa, nontender, mobile. Gc/Ct, Wet prep collected    CT Chest/Abdomen/Pelvis w Contrast  Narrative: CT CHEST/ABDOMEN/PELVIS WITH CONTRAST  11/5/2019 1:53 PM    HISTORY:  Adrenocortical carcinoma status post resection, now on  adjuvant mitotane. Restaging scan. Malignant neoplasm of cortex of  right adrenal gland (H).    TECHNIQUE: CT scan obtained of the chest, abdomen, and pelvis with  oral and IV contrast. Isovue 370 92ml IV injected. Radiation dose for  this scan was reduced using automated exposure control, adjustment of  the mA and/or kV according to patient size, or iterative  reconstruction technique.    COMPARISON:  CT chest, abdomen, and pelvis 8/14/2019.    FINDINGS:  Chest: No acute mediastinal abnormality. Some residual thymic tissue  at the anterior mediastinum again noted. No new adenopathy in the  chest. Trace posterior right pleural fluid is stable.    No acute airspace disease. A tiny 0.2 cm nodule newly seen at the  lateral right upper lobe series 6 image 52. Mild right-sided  atelectasis.    Abdomen/pelvis: Right adrenalectomy changes appear stable with some  hazy opacity, but no mass effect. Normal left adrenal. The  postcontrast liver shows a peripherally enhancing lesion suggesting a  hemangioma measuring 2.4 cm, stable inferiorly on the right series 2  image 73. Spleen, pancreas, and kidneys do " not show any significant  abnormalities. No acute bowel abnormality. New prominent left ovarian  cyst is 5.3 cm series 3 image 509. Previously noted prominent right  ovarian cyst has resolved. There are other bilateral ovarian cysts.  IUD at the central uterus. No acute bowel abnormality. No enlarged  lymph node is seen in the abdomen or pelvis. No new bone lesion in the  chest, abdomen, or pelvis.  Impression: IMPRESSION:  1. New tiny right upper lobe pulmonary nodule of questionable clinical  significance. Recommend further attention to this at imaging  follow-up.  2. No other new disease identified.  3. New large left ovarian cyst. Resolution of the prominent right  ovarian cyst described on the prior exam.    VALENTINE OLIVEIRA MD    Labs:   Wet prep, Gc/Ct, HIV, Hep B, Hep C, Treponema    Assessment/Plan:  Suzy Rodríguez is a 34 year old  here by referral for new ovarian cysts in the setting of Mitotane treatment. Discussed with pt that this is not a medication we see patients on regularly, but that literature suggests that this is not uncommon. As previous cyst spontaneously resolved would recommend repeat US in 3 months to follow cysts. At this time no need for tumor markers, however if persistent would recommend tumor markers and possibly surgical management.    Full STI testing today, please do NOT release results to [facility]. Patient would like telephone call with results. Ok to leave message    Patient's mood not positive at the moment. Feels safe, but unhappy with current situation due to illness. Encouraged patient to continue with therapy, return to clinic with any concerns.     Return to clinic in 3 months for repeat US and clinic follow up     Staffed with Dr. Shalom Nolasco MD  Obstetrics and Gyncology, PGY-3  November 14, 2019 , 3:41 PM      The patient was seen in resident continuity clinic by Dr. Martinez.  I have reviewed the history and exam, the assessment and plan were  jointly made.     Luba Rausch MD, FACOG

## 2019-11-15 LAB
C TRACH DNA SPEC QL NAA+PROBE: NEGATIVE
HBV SURFACE AB SERPL IA-ACNC: 131.43 M[IU]/ML
HBV SURFACE AG SERPL QL IA: NONREACTIVE
HCV AB SERPL QL IA: NONREACTIVE
HIV 1+2 AB+HIV1 P24 AG SERPL QL IA: NONREACTIVE
N GONORRHOEA DNA SPEC QL NAA+PROBE: NEGATIVE
SPECIMEN SOURCE: NORMAL
SPECIMEN SOURCE: NORMAL
T PALLIDUM AB SER QL: NONREACTIVE

## 2019-11-15 ASSESSMENT — ANXIETY QUESTIONNAIRES: GAD7 TOTAL SCORE: 17

## 2019-11-18 ENCOUNTER — TELEPHONE (OUTPATIENT)
Dept: OBGYN | Facility: CLINIC | Age: 34
End: 2019-11-18

## 2019-11-18 NOTE — TELEPHONE ENCOUNTER
Patient called to review results. Reviewed results of labs completed with Dr. Rausch. All questions answered.

## 2019-11-20 ENCOUNTER — TELEPHONE (OUTPATIENT)
Dept: ONCOLOGY | Facility: CLINIC | Age: 34
End: 2019-11-20

## 2019-11-20 NOTE — TELEPHONE ENCOUNTER
SARAH/JOSH Almonte re: her annual re-enrollment request for her medical marijuana. Asked her to please provide which provider initially enrolled her on the program, since they should be the provider who completes the re-enrollment.     Informed Suzy that if she was not sure, she should call the [place] Dept of [facility] Medical Cannibas Program to check. They will not give that information out to anyone but the patient.     Will wait to hear from Suzy before proceeding.    Yvonne Begum, LCN, RN  RN Care Coordinator  Dr. Cintron

## 2019-11-25 NOTE — TELEPHONE ENCOUNTER
Returned call from Suzy following her VM message from 11/21. Left message for Suzy reiterating below information and asking for call back.     Waiting for return call before proceeding.     Yvonne Begum, LCN, RN  RN Care Coordinator  Dr. Cintron

## 2019-11-27 DIAGNOSIS — C74.01 MALIGNANT NEOPLASM OF CORTEX OF RIGHT ADRENAL GLAND (H): Primary | ICD-10-CM

## 2019-11-27 RX ORDER — MEPERIDINE HYDROCHLORIDE 25 MG/ML
25 INJECTION INTRAMUSCULAR; INTRAVENOUS; SUBCUTANEOUS EVERY 30 MIN PRN
Status: CANCELLED | OUTPATIENT
Start: 2019-12-11

## 2019-11-27 RX ORDER — EPINEPHRINE 1 MG/ML
0.3 INJECTION, SOLUTION, CONCENTRATE INTRAVENOUS EVERY 5 MIN PRN
Status: CANCELLED | OUTPATIENT
Start: 2019-12-11

## 2019-11-27 RX ORDER — DIPHENHYDRAMINE HYDROCHLORIDE 50 MG/ML
50 INJECTION INTRAMUSCULAR; INTRAVENOUS
Status: CANCELLED
Start: 2019-12-11

## 2019-11-27 RX ORDER — ALBUTEROL SULFATE 0.83 MG/ML
2.5 SOLUTION RESPIRATORY (INHALATION)
Status: CANCELLED | OUTPATIENT
Start: 2019-12-11

## 2019-11-27 RX ORDER — EPINEPHRINE 0.3 MG/.3ML
0.3 INJECTION SUBCUTANEOUS EVERY 5 MIN PRN
Status: CANCELLED | OUTPATIENT
Start: 2019-12-11

## 2019-11-27 RX ORDER — ALBUTEROL SULFATE 90 UG/1
1-2 AEROSOL, METERED RESPIRATORY (INHALATION)
Status: CANCELLED
Start: 2019-12-11

## 2019-11-27 RX ORDER — SODIUM CHLORIDE 9 MG/ML
1000 INJECTION, SOLUTION INTRAVENOUS CONTINUOUS PRN
Status: CANCELLED
Start: 2019-12-11

## 2019-12-02 ENCOUNTER — RECORDS - HEALTHEAST (OUTPATIENT)
Dept: ADMINISTRATIVE | Facility: OTHER | Age: 34
End: 2019-12-02

## 2019-12-02 ENCOUNTER — OFFICE VISIT (OUTPATIENT)
Dept: ENDOCRINOLOGY | Facility: CLINIC | Age: 34
End: 2019-12-02
Payer: COMMERCIAL

## 2019-12-02 ENCOUNTER — TELEPHONE (OUTPATIENT)
Dept: ENDOCRINOLOGY | Facility: CLINIC | Age: 34
End: 2019-12-02

## 2019-12-02 VITALS
DIASTOLIC BLOOD PRESSURE: 76 MMHG | BODY MASS INDEX: 25.56 KG/M2 | HEART RATE: 93 BPM | SYSTOLIC BLOOD PRESSURE: 113 MMHG | WEIGHT: 148.9 LBS

## 2019-12-02 DIAGNOSIS — D64.9 ANEMIA, UNSPECIFIED TYPE: ICD-10-CM

## 2019-12-02 DIAGNOSIS — C74.01 ADRENAL CORTEX CANCER, RIGHT (H): ICD-10-CM

## 2019-12-02 DIAGNOSIS — E27.40 ADRENAL INSUFFICIENCY (H): Primary | ICD-10-CM

## 2019-12-02 DIAGNOSIS — Z98.84 GASTRIC BYPASS STATUS FOR OBESITY: ICD-10-CM

## 2019-12-02 DIAGNOSIS — E61.1 IRON DEFICIENCY: ICD-10-CM

## 2019-12-02 DIAGNOSIS — E27.40 ADRENAL INSUFFICIENCY (H): ICD-10-CM

## 2019-12-02 DIAGNOSIS — E03.8 SECONDARY HYPOTHYROIDISM: ICD-10-CM

## 2019-12-02 LAB
ANION GAP SERPL CALCULATED.3IONS-SCNC: 4 MMOL/L (ref 3–14)
BUN SERPL-MCNC: 8 MG/DL (ref 7–30)
CALCIUM SERPL-MCNC: 7.8 MG/DL (ref 8.5–10.1)
CHLORIDE SERPL-SCNC: 103 MMOL/L (ref 94–109)
CO2 SERPL-SCNC: 28 MMOL/L (ref 20–32)
CORTIS SERPL-MCNC: 32.7 UG/DL (ref 4–22)
CREAT SERPL-MCNC: 0.7 MG/DL (ref 0.52–1.04)
FERRITIN SERPL-MCNC: 4 NG/ML (ref 12–150)
GFR SERPL CREATININE-BSD FRML MDRD: >90 ML/MIN/{1.73_M2}
GLUCOSE SERPL-MCNC: 65 MG/DL (ref 70–99)
POTASSIUM SERPL-SCNC: 3.3 MMOL/L (ref 3.4–5.3)
SODIUM SERPL-SCNC: 135 MMOL/L (ref 133–144)

## 2019-12-02 ASSESSMENT — PAIN SCALES - GENERAL: PAINLEVEL: NO PAIN (0)

## 2019-12-02 NOTE — PATIENT INSTRUCTIONS
Instructions for Collection of a 24 hour or Timed Urine Specimen    Your doctor has requested that you collect all of your urine for a 24 hour urine lab test. Please follow the instructions below    1. The day before you are to bring your specimen:  At 7:00am (or when you get up the day) empty your bladder as completely as possible. Discard this specimen.    2. During the 24 hour collection period store the collection container in a cool place or in the refrigerator. Some collections require a special preservative that will be in the container if required.    3. At 7:00am the next day (or when you started your urine collection on the previous day) void and add to the collection container. Record the time and date of the end of the collection period. This completes the 24 hour urine collection.    4. Bring the entire specimen directly to the lab  A lab with your name, medical record number and date of birth must be attached to the urine container.  A lab request form completed by your clinic/doctor with your name medical number number, date of birth and test requested must accompany the urine specimen.  Record on the lab request form the date and time (am/pm) of the start of the urine collection and date and time of the end of the urine collection.    5. If you have any questions, feel free to call the laboratory at [phone] or [phone] or your clinic.

## 2019-12-02 NOTE — TELEPHONE ENCOUNTER
----- Message from Veena Jaquez MD sent at 12/2/2019 10:00 AM CST -----  This patient run out of [facility] and Dr. SNEED is out. Please sent this message to the covering oncology physician, as she needs a refill.

## 2019-12-02 NOTE — LETTER
"12/2/2019       RE: Suzy Rodríguez  [address]  [facility] [address]     Dear Colleague,    Thank you for referring your patient, Suzy Rodríguez, to the [facility] ENDOCRINOLOGY at [facility]. Please see a copy of my visit note below.    The patient is seen in f/up for adrenocortical cancer.     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  The adrenal mass was first identified in May 2018, on an abdominal CT done for evaluation of kidney stones.  The mass measured 9.2 x 8 cm on the noncontrast CT from 5/8/18, and was inseparable from the liver and upper pole of the right kidney.  The chest CT from 6/14/18 identified 2 indeterminate solid pulmonary nodules, with the largest measuring 3 mm in the subpleural posterior left lower lobe.  The patient underwent right adrenalectomy on 6/25/18.  Surgery was complicated by diaphragmatic injury during hepatic mobilization and small fracture of the right liver lobe, managed conservatively.  The pathology report (re read at [facility]) revealed the following:  \"Adrenal cortical carcinoma, low grade, oncocytic type, forming a mass 11.3 cm and weighing 412 grams (per report). Surgical resection margins are positive for tumor (per report). Lymphovascular invasion is focally present. The tumor seems to be confined to the adrenal gland.  AJCC Stage (with available surgical materials): pT2NX (8th edition, 2017). Immunoperoxidase stains were performed on paraffin sections at the referring institution and reviewed at [facility] in [place], [place].  Tumor cells are positive for calretinin, synaptophysin and Melan A, supporting the above diagnosis. Ki-67 reveal an increased proliferation index of approximately 20% in the most active areas.\"    Treatment with mitotane was started in " "July 2018 and she completed radiation to the adrenal bed on October 8, 2018.  In December 2018 she was evaluated at [facility].  She was diagnosed with secondary hypothyroidism and started on 112 mcg levothyroxine daily.    More recently, she was seen by , at the [facility], in 9/2019.  According to the outside note, \"her last Mitotane level was 16.8 in August, 2019, and she is currently taking Mitotane 1000 mg TID. She does note increased nausea and vomiting, and states she is nauseated a lot, and she does vomit 2-3 times/day 4-5 days/week. She is using Compazine, which is not helpful and medical marijuana. She has noted increased diarrhea and was going 4-5 times/day, but was started on Lomotil, which she is taking 4 times/day and is now going once daily. She does complain of dry mouth from Lomotil. She does note increasing fatigue, and does note increasing slurred, slowed speech and does not memory loss.\"    As per patient, given the severity of side effects, mitotane was discontinued for 3 weeks, at the end of September/beginning of October this year.  At that time, she was taking a dose of 1500 mg 3 times daily.  During that period of 3 weeks, she reports feeling significantly better.  Subsequently, she resumed taking mitotane at a lower dose, of 1000 mg 3 times a day.     She reports being compliant in taking 125 mcg levothyroxine daily, 30 mg hydrocortisone around 9 AM (when she wakes up) and 20 mg at 2:30 PM, and 0.1 mg fludrocortisone at bedtime.    She continues to complain of symptoms which she attributes to mitotane: Diarrhea, persistent nausea, vomiting (2-3 times a week), dizziness, slurred speech, confusion, difficulty finding words.  She continues to have a hand tremor which started at the time she started treatment with mitotane.  When she gets up from a sitting or lying position, she gets lightheaded for a few seconds.     Pertinent data and labs:  5/14/18 normal plasma " "metanephrines  5/31/18 ALD 13.4, renin 0.8   5/28/18 urinary cortisol 351.6, 8 AM cortisol 23 (after dexamethasone), normal metanephrines     6/5/18 ACTH<10, DHEAS 740 (prior to surgery)  7/30/18  DHEAS <0.5, cortisol AM 19, testosterone <7    7/31/18 aldosterone 7.3  8/10/18 CRH 3 (normal<10)  12/3/18 cortisol PM 36, free T4 0.7, TSH 2.3, Ca 8.6,  (elevated since 11/18)   12/4/18 ACTH <5   12/6/18  DHEAS <0.5  12/20/18 ferritin 9  2/12/19 androstenedione 0.471, K 3.6, ALD 7.7 renin 4.4   2/13/19 24 hrs urinary cortisol 130.9   My recommendation was to decrease HC to 25 mg in am, 10 mg at noon and 5 mg at 5 pm.   She has continued to take 30 mg HC in and and 20 mg at 2 pm, per Dr. Huertas from [facility].  Reports sleeping good since starting trazodone.  2/18 started on fludrocortisone at 0.1 mg daily   2/26/19 DHEAS <15   2/26/19 9 AM cortisol 7.4, ACTH 23 (5-52).   3/12/19 DHEAS <15  3/12/19 K 4.4.  4/10/19 K 3  ...  9/24/2019 potassium 4, sodium 137, renin 10.6, mitotane 18.8, cortisol 25.8, ACTH < 5, aldosterone 4.9, free T4 1.17, TSH 0.07, DHEAS<15 (lab work was done at 9:30 AM, outside records).      Pertinent imaging reviewed:  7/22/18 MRI brain  -negative   7/23/18 PET/CT Residual fluid collection at the vicinity of the adrenal gland with mild peripheral FDG uptake. This may represent postsurgical fluid collection.   2. Increased right pleural effusion since 6/29/2018. There is a plaque-like pleural thickening with mild elevated FDG uptake. This finding is indeterminant, while its favored to represent an infectious/inflammatory condition, metastasis cannot be clearly excluded. Attention on follow up.  3. Non-FDG avid 2.5 left complex adnexal cyst.  ...  2/11/2019 brain MRI negative for metastasis  11/5/2019 CT chest abdomen and pelvis \"New tiny right upper lobe pulmonary nodule of questionable clinical  significance. Recommend further attention to this at imaging.\"    Suzy underwent bariatric " surgery in 2016. Pre-surgery weight 235 lb, inga post-surgery 135 lbs. Her weight in 2017 was 139 lbs. In 3/2018, the weight was 154 lbs. It was 157 lbs prior to adrenalectomy.   Her current weight is 148 pounds.     Past Medical History   Past Medical History:   Diagnosis Date     Adrenal cortex cancer, right (H) 2018    Resected 18, Dr. Mansfield.     Adrenal mass (H)      Chocolate cyst of ovary      History of miscarriage      Malignant neoplasm of cortex of right adrenal gland (H) 2018    EOTD; 19.  Check in May. SCP started.  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) 2018   Cushing's syndrome, secondary to #1 (300 mcg/24 hr); Rx hydrocortisone 20 + 10 post op   Post operative defect right hemidiaphragm with ?worsening right effussion, not present preop   Currently on mitotane, 500 mg, BID x 1 week + hydrocortisone     MTHFR mutation (H)    Diverticulosis  Obstructive sleep apnea which resolved following gastric bypass  GERD  Iron deficiency  Anemia  Ovarian cyst   IUD inserted on 19    Past Surgical History   Past Surgical History:   Procedure Laterality Date     ADRENALECTOMY Right 2018    Procedure: ADRENALECTOMY;  Right Adrenalectomy,  Embolize Liver , Anesthesia Block;  Surgeon: Warren Mansfield MD;  Location: [facility] OR     ADRENALECTOMY        SECTION      twice      SECTION      2     EMBOLECTOMY ABDOMEN N/A 2018    Procedure: EMBOLECTOMY ABDOMEN;;  Surgeon: Ector Mcintyre MD;  Location: [facility] OR     EXTRACORPOREAL SHOCK WAVE LITHOTRIPSY (ESWL)       GASTRIC BYPASS         Current Medications    Current Outpatient Medications:      acetaminophen (TYLENOL) 325 MG tablet, Take 2 tablets (650 mg) by mouth every 4 hours as needed for other (multimodal surgical pain management along with NSAIDS and opioid medication as indicated based on pain control and physical function.),  Disp: 150 tablet, Rfl: 0     buPROPion (WELLBUTRIN) 100 MG tablet, TAKE 1 TABLET (100 MG TOTAL) BY MOUTH 2 (TWO) TIMES A DAY., Disp: , Rfl: 3     calcium carbonate antacid 1000 MG CHEW, Take 1 tablet by mouth every morning , Disp: , Rfl:      cholecalciferol (VITAMIN D-1000 MAX ST) 1000 units TABS, Take 2,000 Units by mouth every morning , Disp: , Rfl:      cyclobenzaprine (FLEXERIL) 5 MG tablet, Take 5-10 mg by mouth, Disp: , Rfl:      diazepam (VALIUM) 5 MG tablet, Take 1 tablet (5 mg) by mouth once as needed for anxiety (MRI claustrophobia management), Disp: 2 tablet, Rfl: 3     diphenoxylate-atropine (LOMOTIL) 2.5-0.025 MG tablet, Take 1 tablet by mouth 3 times daily as needed for diarrhea, Disp: 90 tablet, Rfl: 3     EPINEPHrine (EPIPEN/ADRENACLICK/OR ANY BX GENERIC EQUIV) 0.3 MG/0.3ML injection 2-pack, As directed for bee stings, Disp: , Rfl:      ferrous sulfate (IRON) 325 (65 Fe) MG tablet, Take 325 mg by mouth daily, Disp: , Rfl:      fludrocortisone (FLORINEF) 0.1 MG tablet, Take 0.5 tablets (0.05 mg) by mouth daily (Patient taking differently: Take 0.1 mg by mouth daily ), Disp: 45 tablet, Rfl: 3     FLUoxetine (PROZAC) 40 MG capsule, Take 40 mg by mouth daily, Disp: , Rfl:      gabapentin (NEURONTIN) 300 MG capsule, Take 300 mg by mouth 3 times daily, Disp: , Rfl:      hydrocortisone (CORTEF) 10 MG tablet, Take 3 pills (=30 mg) in AM and 2 pills (=20 mg) at 2 pm daily, additional as directed., Disp: 180 tablet, Rfl: 11     levothyroxine (SYNTHROID/LEVOTHROID) 125 MCG tablet, Take 1 tablet (125 mcg) by mouth daily, Disp: 90 tablet, Rfl: 3     mitotane (LYSODREN) 500 MG tablet, Take 2 tablets (1,000 mg) by mouth 2 times daily, Disp: 120 tablet, Rfl: 0     Multiple Vitamins-Calcium (ONE-A-DAY WOMENS PO), Take 2 tablets by mouth every morning , Disp: , Rfl:      pantoprazole (PROTONIX) 40 MG EC tablet, Take 1 tablet (40 mg) by mouth daily, Disp: 30 tablet, Rfl: 0     prochlorperazine (COMPAZINE) 5 MG tablet,  Take 1 tablet (5 mg) by mouth every 8 hours as needed for nausea or vomiting, Disp: 90 tablet, Rfl: 3     traZODone (DESYREL) 50 MG tablet, Take 2 tablets (100 mg) by mouth nightly as needed for sleep, Disp: 60 tablet, Rfl: 0     UNABLE TO FIND, medical cannabis (Patient's own supply. Not a prescription), Disp: , Rfl:   Iron supplement daily for 6 months     No family history on file.    Social History   with 2 children, 7 and 10-year-old. She denies smoking, drinking alcohol or using illicit drugs. Occupation: disabled.     Review of Systems   Systemic:              Fatigue    Eye:                      No eye symptoms   Saira-Laryngeal:     occasional dysphagia for both smith and liquids, no hoarseness, no cough     Breast:                  No breast symptoms  Cardiovascular:    No cardiovascular symptoms, no CP or palpitations   Pulmonary:           SOB when anxious and with exertion   Gastrointestinal:  As above   Genitourinary:      increased urination - urinates 1-2 times a night; regular spotting on IUD, monthly   Endocrine:            Denies cold or heat intolerance  Neurological:        No neurological symptoms, no headaches, hands tremor, occasional numbness or tingling sensation in her fingers  Musculoskeletal:  No joint or muscle pain   Skin:                     dry skin, no hair falling out   Psychological:     Anxiety and depression; not suicidal               Vital Signs     Previous Weights:    Wt Readings from Last 10 Encounters:   12/02/19 67.5 kg (148 lb 14.4 oz)   11/14/19 65.8 kg (145 lb)   11/06/19 67.3 kg (148 lb 6.4 oz)   10/02/19 67.6 kg (149 lb)   08/27/19 67.7 kg (149 lb 3.2 oz)   08/14/19 68.5 kg (151 lb)   07/17/19 66.2 kg (146 lb)   06/18/19 68.9 kg (151 lb 14.4 oz)   05/08/19 71.1 kg (156 lb 11.2 oz)   04/29/19 71.3 kg (157 lb 3.2 oz)        /76   Pulse 93   Wt 67.5 kg (148 lb 14.4 oz)   LMP 11/13/2019   BMI 25.56 kg/m     Supine /71, pulse 81  Standing 2 minutes  117/80 pulse 99    Physical Exam  General Appearance: well developed, pale, well nourished, no distress noted  Eyes:  conjutivae and extra-ocular motions are normal.                                    pupils round and reactive to light, no lid lag, no stare    HEENT:   oropharynx clear and moist, no JVD, no bruits      no thyromegaly, no palpable nodules   Cardiovascular:  regular rhythm, no murmurs, distal pulse palpable, no edema  Respiratory:        chest clear, no rales, no rhonchi   Gastrointestinal:  abdomen soft, non-tender, non-distended, normal bowel sounds,    no organomegaly  Musculoskeletal:  normal tone and strength  Psychological:          affect and judgment normal  Skin:  narrow striae on the lateral abdominal flanks, pale   Neurological:  reflexes symmetric and overactive, coarse resting tremor of the outstretched hands.     Lab Results  I reviewed prior lab results documented in Epic.  TSH   Date Value Ref Range Status   11/05/2019 0.11 (L) 0.40 - 4.00 mU/L Final   08/14/2019 0.04 (L) 0.40 - 4.00 mU/L Final   07/17/2019 0.04 (L) 0.40 - 4.00 mU/L Final   06/18/2019 0.09 (L) 0.40 - 4.00 mU/L Final   05/08/2019 0.27 (L) 0.40 - 4.00 mU/L Final     T4 Free   Date Value Ref Range Status   11/05/2019 0.98 0.76 - 1.46 ng/dL Final   08/14/2019 0.92 0.76 - 1.46 ng/dL Final   07/17/2019 1.14 0.76 - 1.46 ng/dL Final   06/18/2019 1.00 0.76 - 1.46 ng/dL Final   05/08/2019 0.98 0.76 - 1.46 ng/dL Final       Assessment     1. 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 Ki-67 revealed an increased proliferation index of approximately 20%.   The tumor was functional and presented with high cortisol and DHEAS levels.  Post surgery/radiation and while undergoing treatment with mitotane, the DHEAS has remained undetectable.    The patient has been treated with mitotane since July 2018, currently at a dose of 1000 mg TID.      2.  Primary adrenal insufficiency, mitotane-induced    Suspect most of her symptoms are side effects from mitotane. She has no orthostatic hypotension, just tachycardia.   Plan:  F/up BMP, ACTH, cortisol and 24 hrs urinary cortisol level  Adjust the HC and florinef dose based on lab results.     3. Hypothyroidism, presumed to be secondary to mitotane induced suppression of TSH   The patient has been on 125 mcg levothyroxine replacement.  Most recent free T4 was normal.  I recommended to continue to take the same dose of levothyroxine.    4. Anemia   F/up ferritin level, given her h/o gastric by pass.     Orders Placed This Encounter   Procedures     Renin activity     Cortisol     Adrenal corticotropin     Basic metabolic panel     Cortisol free urine     Creatinine timed urine     RTC 6 months.                Again, thank you for allowing me to participate in the care of your patient.      Sincerely,    Veena Jaquez MD

## 2019-12-02 NOTE — TELEPHONE ENCOUNTER
This was filled by Dr Cintron  to [facility] last week. Liz Joyce RN on 12/2/2019 at 11:46 AM

## 2019-12-02 NOTE — LETTER
"12/2/2019      RE: Suzy Rodríguez  [address]  [facility] [id number]     The patient is seen in f/up for adrenocortical cancer.     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  The adrenal mass was first identified in May 2018, on an abdominal CT done for evaluation of kidney stones.  The mass measured 9.2 x 8 cm on the noncontrast CT from 5/8/18, and was inseparable from the liver and upper pole of the right kidney.  The chest CT from 6/14/18 identified 2 indeterminate solid pulmonary nodules, with the largest measuring 3 mm in the subpleural posterior left lower lobe.  The patient underwent right adrenalectomy on 6/25/18.  Surgery was complicated by diaphragmatic injury during hepatic mobilization and small fracture of the right liver lobe, managed conservatively.  The pathology report (re read at [facility]) revealed the following:  \"Adrenal cortical carcinoma, low grade, oncocytic type, forming a mass 11.3 cm and weighing 412 grams (per report). Surgical resection margins are positive for tumor (per report). Lymphovascular invasion is focally present. The tumor seems to be confined to the adrenal gland.  AJCC Stage (with available surgical materials): pT2NX (8th edition, 2017). Immunoperoxidase stains were performed on paraffin sections at the referring institution and reviewed at [facility] in [place], [place].  Tumor cells are positive for calretinin, synaptophysin and Melan A, supporting the above diagnosis. Ki-67 reveal an increased proliferation index of approximately 20% in the most active areas.\"    Treatment with mitotane was started in July 2018 and she completed radiation to the adrenal bed on October 8, 2018.  In December 2018 she was evaluated at [facility].  She was diagnosed with secondary hypothyroidism and started on 112 mcg " "levothyroxine daily.    More recently, she was seen by , at the [facility], in 9/2019.  According to the outside note, \"her last Mitotane level was 16.8 in August, 2019, and she is currently taking Mitotane 1000 mg TID. She does note increased nausea and vomiting, and states she is nauseated a lot, and she does vomit 2-3 times/day 4-5 days/week. She is using Compazine, which is not helpful and medical marijuana. She has noted increased diarrhea and was going 4-5 times/day, but was started on Lomotil, which she is taking 4 times/day and is now going once daily. She does complain of dry mouth from Lomotil. She does note increasing fatigue, and does note increasing slurred, slowed speech and does not memory loss.\"    As per patient, given the severity of side effects, mitotane was discontinued for 3 weeks, at the end of September/beginning of October this year.  At that time, she was taking a dose of 1500 mg 3 times daily.  During that period of 3 weeks, she reports feeling significantly better.  Subsequently, she resumed taking mitotane at a lower dose, of 1000 mg 3 times a day.     She reports being compliant in taking 125 mcg levothyroxine daily, 30 mg hydrocortisone around 9 AM (when she wakes up) and 20 mg at 2:30 PM, and 0.1 mg fludrocortisone at bedtime.    She continues to complain of symptoms which she attributes to mitotane: Diarrhea, persistent nausea, vomiting (2-3 times a week), dizziness, slurred speech, confusion, difficulty finding words.  She continues to have a hand tremor which started at the time she started treatment with mitotane.  When she gets up from a sitting or lying position, she gets lightheaded for a few seconds.     Pertinent data and labs:  5/14/18 normal plasma metanephrines  5/31/18 ALD 13.4, renin 0.8   5/28/18 urinary cortisol 351.6, 8 AM cortisol 23 (after dexamethasone), normal metanephrines     6/5/18 ACTH<10, DHEAS 740 (prior to surgery)  7/30/18  DHEAS <0.5, " "cortisol AM 19, testosterone <7    7/31/18 aldosterone 7.3  8/10/18 CRH 3 (normal<10)  12/3/18 cortisol PM 36, free T4 0.7, TSH 2.3, Ca 8.6,  (elevated since 11/18)   12/4/18 ACTH <5   12/6/18  DHEAS <0.5  12/20/18 ferritin 9  2/12/19 androstenedione 0.471, K 3.6, ALD 7.7 renin 4.4   2/13/19 24 hrs urinary cortisol 130.9   My recommendation was to decrease HC to 25 mg in am, 10 mg at noon and 5 mg at 5 pm.   She has continued to take 30 mg HC in and and 20 mg at 2 pm, per Dr. Huertas from [facility].  Reports sleeping good since starting trazodone.  2/18 started on fludrocortisone at 0.1 mg daily   2/26/19 DHEAS <15   2/26/19 9 AM cortisol 7.4, ACTH 23 (5-52).   3/12/19 DHEAS <15  3/12/19 K 4.4.  4/10/19 K 3  ...  9/24/2019 potassium 4, sodium 137, renin 10.6, mitotane 18.8, cortisol 25.8, ACTH < 5, aldosterone 4.9, free T4 1.17, TSH 0.07, DHEAS<15 (lab work was done at 9:30 AM, outside records).      Pertinent imaging reviewed:  7/22/18 MRI brain  -negative   7/23/18 PET/CT Residual fluid collection at the vicinity of the adrenal gland with mild peripheral FDG uptake. This may represent postsurgical fluid collection.   2. Increased right pleural effusion since 6/29/2018. There is a plaque-like pleural thickening with mild elevated FDG uptake. This finding is indeterminant, while its favored to represent an infectious/inflammatory condition, metastasis cannot be clearly excluded. Attention on follow up.  3. Non-FDG avid 2.5 left complex adnexal cyst.  ...  2/11/2019 brain MRI negative for metastasis  11/5/2019 CT chest abdomen and pelvis \"New tiny right upper lobe pulmonary nodule of questionable clinical  significance. Recommend further attention to this at imaging.\"    Suzy underwent bariatric surgery in 6/2016. Pre-surgery weight 235 lb, inga post-surgery 135 lbs. Her weight in 4/2017 was 139 lbs. In 3/2018, the weight was 154 lbs. It was 157 lbs prior to adrenalectomy.   Her current weight is 148 " pounds.     Past Medical History   Past Medical History:   Diagnosis Date     Adrenal cortex cancer, right (H) 2018    Resected 18, Dr. Mansfield.     Adrenal mass (H)      Chocolate cyst of ovary      History of miscarriage      Malignant neoplasm of cortex of right adrenal gland (H) 2018    EOTD; 19.  Check in May. SCP started.  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) 2018   Cushing's syndrome, secondary to #1 (300 mcg/24 hr); Rx hydrocortisone 20 + 10 post op   Post operative defect right hemidiaphragm with ?worsening right effussion, not present preop   Currently on mitotane, 500 mg, BID x 1 week + hydrocortisone     MTHFR mutation (H)    Diverticulosis  Obstructive sleep apnea which resolved following gastric bypass  GERD  Iron deficiency  Anemia  Ovarian cyst   IUD inserted on 19    Past Surgical History   Past Surgical History:   Procedure Laterality Date     ADRENALECTOMY Right 2018    Procedure: ADRENALECTOMY;  Right Adrenalectomy,  Embolize Liver , Anesthesia Block;  Surgeon: Warren Mansfield MD;  Location: [facility] OR     ADRENALECTOMY        SECTION      twice      SECTION      2     EMBOLECTOMY ABDOMEN N/A 2018    Procedure: EMBOLECTOMY ABDOMEN;;  Surgeon: Ector Mcintyre MD;  Location: [facility] OR     EXTRACORPOREAL SHOCK WAVE LITHOTRIPSY (ESWL)       GASTRIC BYPASS         Current Medications    Current Outpatient Medications:      acetaminophen (TYLENOL) 325 MG tablet, Take 2 tablets (650 mg) by mouth every 4 hours as needed for other (multimodal surgical pain management along with NSAIDS and opioid medication as indicated based on pain control and physical function.), Disp: 150 tablet, Rfl: 0     buPROPion (WELLBUTRIN) 100 MG tablet, TAKE 1 TABLET (100 MG TOTAL) BY MOUTH 2 (TWO) TIMES A DAY., Disp: , Rfl: 3     calcium carbonate antacid 1000 MG CHEW, Take 1 tablet by mouth  every morning , Disp: , Rfl:      cholecalciferol (VITAMIN D-1000 MAX ST) 1000 units TABS, Take 2,000 Units by mouth every morning , Disp: , Rfl:      cyclobenzaprine (FLEXERIL) 5 MG tablet, Take 5-10 mg by mouth, Disp: , Rfl:      diazepam (VALIUM) 5 MG tablet, Take 1 tablet (5 mg) by mouth once as needed for anxiety (MRI claustrophobia management), Disp: 2 tablet, Rfl: 3     diphenoxylate-atropine (LOMOTIL) 2.5-0.025 MG tablet, Take 1 tablet by mouth 3 times daily as needed for diarrhea, Disp: 90 tablet, Rfl: 3     EPINEPHrine (EPIPEN/ADRENACLICK/OR ANY BX GENERIC EQUIV) 0.3 MG/0.3ML injection 2-pack, As directed for bee stings, Disp: , Rfl:      ferrous sulfate (IRON) 325 (65 Fe) MG tablet, Take 325 mg by mouth daily, Disp: , Rfl:      fludrocortisone (FLORINEF) 0.1 MG tablet, Take 0.5 tablets (0.05 mg) by mouth daily (Patient taking differently: Take 0.1 mg by mouth daily ), Disp: 45 tablet, Rfl: 3     FLUoxetine (PROZAC) 40 MG capsule, Take 40 mg by mouth daily, Disp: , Rfl:      gabapentin (NEURONTIN) 300 MG capsule, Take 300 mg by mouth 3 times daily, Disp: , Rfl:      hydrocortisone (CORTEF) 10 MG tablet, Take 3 pills (=30 mg) in AM and 2 pills (=20 mg) at 2 pm daily, additional as directed., Disp: 180 tablet, Rfl: 11     levothyroxine (SYNTHROID/LEVOTHROID) 125 MCG tablet, Take 1 tablet (125 mcg) by mouth daily, Disp: 90 tablet, Rfl: 3     mitotane (LYSODREN) 500 MG tablet, Take 2 tablets (1,000 mg) by mouth 2 times daily, Disp: 120 tablet, Rfl: 0     Multiple Vitamins-Calcium (ONE-A-DAY WOMENS PO), Take 2 tablets by mouth every morning , Disp: , Rfl:      pantoprazole (PROTONIX) 40 MG EC tablet, Take 1 tablet (40 mg) by mouth daily, Disp: 30 tablet, Rfl: 0     prochlorperazine (COMPAZINE) 5 MG tablet, Take 1 tablet (5 mg) by mouth every 8 hours as needed for nausea or vomiting, Disp: 90 tablet, Rfl: 3     traZODone (DESYREL) 50 MG tablet, Take 2 tablets (100 mg) by mouth nightly as needed for sleep, Disp:  60 tablet, Rfl: 0     UNABLE TO FIND, medical cannabis (Patient's own supply. Not a prescription), Disp: , Rfl:   Iron supplement daily for 6 months     No family history on file.    Social History   with 2 children, 7 and 10-year-old. She denies smoking, drinking alcohol or using illicit drugs. Occupation: disabled.     Review of Systems   Systemic:              Fatigue    Eye:                      No eye symptoms   Saira-Laryngeal:     occasional dysphagia for both smith and liquids, no hoarseness, no cough     Breast:                  No breast symptoms  Cardiovascular:    No cardiovascular symptoms, no CP or palpitations   Pulmonary:           SOB when anxious and with exertion   Gastrointestinal:  As above   Genitourinary:      increased urination - urinates 1-2 times a night; regular spotting on IUD, monthly   Endocrine:            Denies cold or heat intolerance  Neurological:        No neurological symptoms, no headaches, hands tremor, occasional numbness or tingling sensation in her fingers  Musculoskeletal:  No joint or muscle pain   Skin:                     dry skin, no hair falling out   Psychological:     Anxiety and depression; not suicidal               Vital Signs     Previous Weights:    Wt Readings from Last 10 Encounters:   12/02/19 67.5 kg (148 lb 14.4 oz)   11/14/19 65.8 kg (145 lb)   11/06/19 67.3 kg (148 lb 6.4 oz)   10/02/19 67.6 kg (149 lb)   08/27/19 67.7 kg (149 lb 3.2 oz)   08/14/19 68.5 kg (151 lb)   07/17/19 66.2 kg (146 lb)   06/18/19 68.9 kg (151 lb 14.4 oz)   05/08/19 71.1 kg (156 lb 11.2 oz)   04/29/19 71.3 kg (157 lb 3.2 oz)        /76   Pulse 93   Wt 67.5 kg (148 lb 14.4 oz)   LMP 11/13/2019   BMI 25.56 kg/m     Supine /71, pulse 81  Standing 2 minutes 117/80 pulse 99    Physical Exam  General Appearance: well developed, pale, well nourished, no distress noted  Eyes:  conjutivae and extra-ocular motions are normal.                                    pupils  round and reactive to light, no lid lag, no stare    HEENT:   oropharynx clear and moist, no JVD, no bruits      no thyromegaly, no palpable nodules   Cardiovascular:  regular rhythm, no murmurs, distal pulse palpable, no edema  Respiratory:        chest clear, no rales, no rhonchi   Gastrointestinal:  abdomen soft, non-tender, non-distended, normal bowel sounds,    no organomegaly  Musculoskeletal:  normal tone and strength  Psychological:          affect and judgment normal  Skin:  narrow striae on the lateral abdominal flanks, pale   Neurological:  reflexes symmetric and overactive, coarse resting tremor of the outstretched hands.     Lab Results  I reviewed prior lab results documented in Epic.  TSH   Date Value Ref Range Status   11/05/2019 0.11 (L) 0.40 - 4.00 mU/L Final   08/14/2019 0.04 (L) 0.40 - 4.00 mU/L Final   07/17/2019 0.04 (L) 0.40 - 4.00 mU/L Final   06/18/2019 0.09 (L) 0.40 - 4.00 mU/L Final   05/08/2019 0.27 (L) 0.40 - 4.00 mU/L Final     T4 Free   Date Value Ref Range Status   11/05/2019 0.98 0.76 - 1.46 ng/dL Final   08/14/2019 0.92 0.76 - 1.46 ng/dL Final   07/17/2019 1.14 0.76 - 1.46 ng/dL Final   06/18/2019 1.00 0.76 - 1.46 ng/dL Final   05/08/2019 0.98 0.76 - 1.46 ng/dL Final       Assessment     1. 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 Ki-67 revealed an increased proliferation index of approximately 20%.   The tumor was functional and presented with high cortisol and DHEAS levels.  Post surgery/radiation and while undergoing treatment with mitotane, the DHEAS has remained undetectable.    The patient has been treated with mitotane since July 2018, currently at a dose of 1000 mg TID.     2.  Primary adrenal insufficiency, mitotane-induced    Suspect most of her symptoms are side effects from mitotane. She has no orthostatic hypotension, just tachycardia.   Plan:  F/up BMP, ACTH, cortisol and  24 hrs urinary cortisol level  Adjust the HC and florinef dose based on lab results.     3. Hypothyroidism, presumed to be secondary to mitotane induced suppression of TSH   The patient has been on 125 mcg levothyroxine replacement.  Most recent free T4 was normal.  I recommended to continue to take the same dose of levothyroxine.    4. Anemia   F/up ferritin level, given her h/o gastric by pass.     Orders Placed This Encounter   Procedures     Renin activity     Cortisol     Adrenal corticotropin     Basic metabolic panel     Cortisol free urine     Creatinine timed urine     RTC 6 months.                Veena Jaquez MD

## 2019-12-02 NOTE — PROGRESS NOTES
"The patient is seen in f/up for adrenocortical cancer.     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  The adrenal mass was first identified in May 2018, on an abdominal CT done for evaluation of kidney stones.  The mass measured 9.2 x 8 cm on the noncontrast CT from 5/8/18, and was inseparable from the liver and upper pole of the right kidney.  The chest CT from 6/14/18 identified 2 indeterminate solid pulmonary nodules, with the largest measuring 3 mm in the subpleural posterior left lower lobe.  The patient underwent right adrenalectomy on 6/25/18.  Surgery was complicated by diaphragmatic injury during hepatic mobilization and small fracture of the right liver lobe, managed conservatively.  The pathology report (re read at [facility]) revealed the following:  \"Adrenal cortical carcinoma, low grade, oncocytic type, forming a mass 11.3 cm and weighing 412 grams (per report). Surgical resection margins are positive for tumor (per report). Lymphovascular invasion is focally present. The tumor seems to be confined to the adrenal gland.  AJCC Stage (with available surgical materials): pT2NX (8th edition, 2017). Immunoperoxidase stains were performed on paraffin sections at the referring institution and reviewed at [facility] in [place], [place].  Tumor cells are positive for calretinin, synaptophysin and Melan A, supporting the above diagnosis. Ki-67 reveal an increased proliferation index of approximately 20% in the most active areas.\"    Treatment with mitotane was started in July 2018 and she completed radiation to the adrenal bed on October 8, 2018.  In December 2018 she was evaluated at [facility].  She was diagnosed with secondary hypothyroidism and started on 112 mcg levothyroxine daily.    More recently, she was seen by , at the " "[facility], in 9/2019.  According to the outside note, \"her last Mitotane level was 16.8 in August, 2019, and she is currently taking Mitotane 1000 mg TID. She does note increased nausea and vomiting, and states she is nauseated a lot, and she does vomit 2-3 times/day 4-5 days/week. She is using Compazine, which is not helpful and medical marijuana. She has noted increased diarrhea and was going 4-5 times/day, but was started on Lomotil, which she is taking 4 times/day and is now going once daily. She does complain of dry mouth from Lomotil. She does note increasing fatigue, and does note increasing slurred, slowed speech and does not memory loss.\"    As per patient, given the severity of side effects, mitotane was discontinued for 3 weeks, at the end of September/beginning of October this year.  At that time, she was taking a dose of 1500 mg 3 times daily.  During that period of 3 weeks, she reports feeling significantly better.  Subsequently, she resumed taking mitotane at a lower dose, of 1000 mg 3 times a day.     She reports being compliant in taking 125 mcg levothyroxine daily, 30 mg hydrocortisone around 9 AM (when she wakes up) and 20 mg at 2:30 PM, and 0.1 mg fludrocortisone at bedtime.    She continues to complain of symptoms which she attributes to mitotane: Diarrhea, persistent nausea, vomiting (2-3 times a week), dizziness, slurred speech, confusion, difficulty finding words.  She continues to have a hand tremor which started at the time she started treatment with mitotane.  When she gets up from a sitting or lying position, she gets lightheaded for a few seconds.     Pertinent data and labs:  5/14/18 normal plasma metanephrines  5/31/18 ALD 13.4, renin 0.8   5/28/18 urinary cortisol 351.6, 8 AM cortisol 23 (after dexamethasone), normal metanephrines     6/5/18 ACTH<10, DHEAS 740 (prior to surgery)  7/30/18  DHEAS <0.5, cortisol AM 19, testosterone <7    7/31/18 aldosterone 7.3  8/10/18 CRH " "3 (normal<10)  12/3/18 cortisol PM 36, free T4 0.7, TSH 2.3, Ca 8.6,  (elevated since 11/18)   12/4/18 ACTH <5   12/6/18  DHEAS <0.5  12/20/18 ferritin 9  2/12/19 androstenedione 0.471, K 3.6, ALD 7.7 renin 4.4   2/13/19 24 hrs urinary cortisol 130.9   My recommendation was to decrease HC to 25 mg in am, 10 mg at noon and 5 mg at 5 pm.   She has continued to take 30 mg HC in and and 20 mg at 2 pm, per Dr. Huertas from [facility].  Reports sleeping good since starting trazodone.  2/18 started on fludrocortisone at 0.1 mg daily   2/26/19 DHEAS <15   2/26/19 9 AM cortisol 7.4, ACTH 23 (5-52).   3/12/19 DHEAS <15  3/12/19 K 4.4.  4/10/19 K 3  ...  9/24/2019 potassium 4, sodium 137, renin 10.6, mitotane 18.8, cortisol 25.8, ACTH < 5, aldosterone 4.9, free T4 1.17, TSH 0.07, DHEAS<15 (lab work was done at 9:30 AM, outside records).      Pertinent imaging reviewed:  7/22/18 MRI brain  -negative   7/23/18 PET/CT Residual fluid collection at the vicinity of the adrenal gland with mild peripheral FDG uptake. This may represent postsurgical fluid collection.   2. Increased right pleural effusion since 6/29/2018. There is a plaque-like pleural thickening with mild elevated FDG uptake. This finding is indeterminant, while its favored to represent an infectious/inflammatory condition, metastasis cannot be clearly excluded. Attention on follow up.  3. Non-FDG avid 2.5 left complex adnexal cyst.  ...  2/11/2019 brain MRI negative for metastasis  11/5/2019 CT chest abdomen and pelvis \"New tiny right upper lobe pulmonary nodule of questionable clinical  significance. Recommend further attention to this at imaging.\"    Suzy underwent bariatric surgery in 6/2016. Pre-surgery weight 235 lb, inga post-surgery 135 lbs. Her weight in 4/2017 was 139 lbs. In 3/2018, the weight was 154 lbs. It was 157 lbs prior to adrenalectomy.   Her current weight is 148 pounds.     Past Medical History   Past Medical History:   Diagnosis Date "     Adrenal cortex cancer, right (H) 2018    Resected 18, Dr. Mansfield.     Adrenal mass (H)      Chocolate cyst of ovary      History of miscarriage      Malignant neoplasm of cortex of right adrenal gland (H) 2018    EOTD; 19.  Check in May. SCP started.  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) 2018   Cushing's syndrome, secondary to #1 (300 mcg/24 hr); Rx hydrocortisone 20 + 10 post op   Post operative defect right hemidiaphragm with ?worsening right effussion, not present preop   Currently on mitotane, 500 mg, BID x 1 week + hydrocortisone     MTHFR mutation (H)    Diverticulosis  Obstructive sleep apnea which resolved following gastric bypass  GERD  Iron deficiency  Anemia  Ovarian cyst   IUD inserted on 19    Past Surgical History   Past Surgical History:   Procedure Laterality Date     ADRENALECTOMY Right 2018    Procedure: ADRENALECTOMY;  Right Adrenalectomy,  Embolize Liver , Anesthesia Block;  Surgeon: Warren Mansfield MD;  Location: [facility] OR     ADRENALECTOMY        SECTION      twice      SECTION      2     EMBOLECTOMY ABDOMEN N/A 2018    Procedure: EMBOLECTOMY ABDOMEN;;  Surgeon: Ector Mcintyre MD;  Location: [facility] OR     EXTRACORPOREAL SHOCK WAVE LITHOTRIPSY (ESWL)       GASTRIC BYPASS         Current Medications    Current Outpatient Medications:      acetaminophen (TYLENOL) 325 MG tablet, Take 2 tablets (650 mg) by mouth every 4 hours as needed for other (multimodal surgical pain management along with NSAIDS and opioid medication as indicated based on pain control and physical function.), Disp: 150 tablet, Rfl: 0     buPROPion (WELLBUTRIN) 100 MG tablet, TAKE 1 TABLET (100 MG TOTAL) BY MOUTH 2 (TWO) TIMES A DAY., Disp: , Rfl: 3     calcium carbonate antacid 1000 MG CHEW, Take 1 tablet by mouth every morning , Disp: , Rfl:      cholecalciferol (VITAMIN D-1000 MAX ST) 1000  units TABS, Take 2,000 Units by mouth every morning , Disp: , Rfl:      cyclobenzaprine (FLEXERIL) 5 MG tablet, Take 5-10 mg by mouth, Disp: , Rfl:      diazepam (VALIUM) 5 MG tablet, Take 1 tablet (5 mg) by mouth once as needed for anxiety (MRI claustrophobia management), Disp: 2 tablet, Rfl: 3     diphenoxylate-atropine (LOMOTIL) 2.5-0.025 MG tablet, Take 1 tablet by mouth 3 times daily as needed for diarrhea, Disp: 90 tablet, Rfl: 3     EPINEPHrine (EPIPEN/ADRENACLICK/OR ANY BX GENERIC EQUIV) 0.3 MG/0.3ML injection 2-pack, As directed for bee stings, Disp: , Rfl:      ferrous sulfate (IRON) 325 (65 Fe) MG tablet, Take 325 mg by mouth daily, Disp: , Rfl:      fludrocortisone (FLORINEF) 0.1 MG tablet, Take 0.5 tablets (0.05 mg) by mouth daily (Patient taking differently: Take 0.1 mg by mouth daily ), Disp: 45 tablet, Rfl: 3     FLUoxetine (PROZAC) 40 MG capsule, Take 40 mg by mouth daily, Disp: , Rfl:      gabapentin (NEURONTIN) 300 MG capsule, Take 300 mg by mouth 3 times daily, Disp: , Rfl:      hydrocortisone (CORTEF) 10 MG tablet, Take 3 pills (=30 mg) in AM and 2 pills (=20 mg) at 2 pm daily, additional as directed., Disp: 180 tablet, Rfl: 11     levothyroxine (SYNTHROID/LEVOTHROID) 125 MCG tablet, Take 1 tablet (125 mcg) by mouth daily, Disp: 90 tablet, Rfl: 3     mitotane (LYSODREN) 500 MG tablet, Take 2 tablets (1,000 mg) by mouth 2 times daily, Disp: 120 tablet, Rfl: 0     Multiple Vitamins-Calcium (ONE-A-DAY WOMENS PO), Take 2 tablets by mouth every morning , Disp: , Rfl:      pantoprazole (PROTONIX) 40 MG EC tablet, Take 1 tablet (40 mg) by mouth daily, Disp: 30 tablet, Rfl: 0     prochlorperazine (COMPAZINE) 5 MG tablet, Take 1 tablet (5 mg) by mouth every 8 hours as needed for nausea or vomiting, Disp: 90 tablet, Rfl: 3     traZODone (DESYREL) 50 MG tablet, Take 2 tablets (100 mg) by mouth nightly as needed for sleep, Disp: 60 tablet, Rfl: 0     UNABLE TO FIND, medical cannabis (Patient's own supply.  Not a prescription), Disp: , Rfl:   Iron supplement daily for 6 months     No family history on file.    Social History   with 2 children, 7 and 10-year-old. She denies smoking, drinking alcohol or using illicit drugs. Occupation: disabled.     Review of Systems   Systemic:              Fatigue    Eye:                      No eye symptoms   Saira-Laryngeal:     occasional dysphagia for both smith and liquids, no hoarseness, no cough     Breast:                  No breast symptoms  Cardiovascular:    No cardiovascular symptoms, no CP or palpitations   Pulmonary:           SOB when anxious and with exertion   Gastrointestinal:  As above   Genitourinary:      increased urination - urinates 1-2 times a night; regular spotting on IUD, monthly   Endocrine:            Denies cold or heat intolerance  Neurological:        No neurological symptoms, no headaches, hands tremor, occasional numbness or tingling sensation in her fingers  Musculoskeletal:  No joint or muscle pain   Skin:                     dry skin, no hair falling out   Psychological:     Anxiety and depression; not suicidal               Vital Signs     Previous Weights:    Wt Readings from Last 10 Encounters:   12/02/19 67.5 kg (148 lb 14.4 oz)   11/14/19 65.8 kg (145 lb)   11/06/19 67.3 kg (148 lb 6.4 oz)   10/02/19 67.6 kg (149 lb)   08/27/19 67.7 kg (149 lb 3.2 oz)   08/14/19 68.5 kg (151 lb)   07/17/19 66.2 kg (146 lb)   06/18/19 68.9 kg (151 lb 14.4 oz)   05/08/19 71.1 kg (156 lb 11.2 oz)   04/29/19 71.3 kg (157 lb 3.2 oz)        /76   Pulse 93   Wt 67.5 kg (148 lb 14.4 oz)   [facility] 11/13/2019   BMI 25.56 kg/m    Supine /71, pulse 81  Standing 2 minutes 117/80 pulse 99    Physical Exam  General Appearance: well developed, pale, well nourished, no distress noted  Eyes:  conjutivae and extra-ocular motions are normal.                                    pupils round and reactive to light, no lid lag, no stare    HEENT:   oropharynx clear  and moist, no JVD, no bruits      no thyromegaly, no palpable nodules   Cardiovascular:  regular rhythm, no murmurs, distal pulse palpable, no edema  Respiratory:        chest clear, no rales, no rhonchi   Gastrointestinal:  abdomen soft, non-tender, non-distended, normal bowel sounds,    no organomegaly  Musculoskeletal:  normal tone and strength  Psychological:          affect and judgment normal  Skin:  narrow striae on the lateral abdominal flanks, pale   Neurological:  reflexes symmetric and overactive, coarse resting tremor of the outstretched hands.     Lab Results  I reviewed prior lab results documented in Epic.  TSH   Date Value Ref Range Status   11/05/2019 0.11 (L) 0.40 - 4.00 mU/L Final   08/14/2019 0.04 (L) 0.40 - 4.00 mU/L Final   07/17/2019 0.04 (L) 0.40 - 4.00 mU/L Final   06/18/2019 0.09 (L) 0.40 - 4.00 mU/L Final   05/08/2019 0.27 (L) 0.40 - 4.00 mU/L Final     T4 Free   Date Value Ref Range Status   11/05/2019 0.98 0.76 - 1.46 ng/dL Final   08/14/2019 0.92 0.76 - 1.46 ng/dL Final   07/17/2019 1.14 0.76 - 1.46 ng/dL Final   06/18/2019 1.00 0.76 - 1.46 ng/dL Final   05/08/2019 0.98 0.76 - 1.46 ng/dL Final       Assessment     1. 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 Ki-67 revealed an increased proliferation index of approximately 20%.   The tumor was functional and presented with high cortisol and DHEAS levels.  Post surgery/radiation and while undergoing treatment with mitotane, the DHEAS has remained undetectable.    The patient has been treated with mitotane since July 2018, currently at a dose of 1000 mg TID.     2.  Primary adrenal insufficiency, mitotane-induced    Suspect most of her symptoms are side effects from mitotane. She has no orthostatic hypotension, just tachycardia.   Plan:  F/up BMP, ACTH, cortisol and 24 hrs urinary cortisol level  Adjust the HC and florinef dose based on lab  results.     3. Hypothyroidism, presumed to be secondary to mitotane induced suppression of TSH   The patient has been on 125 mcg levothyroxine replacement.  Most recent free T4 was normal.  I recommended to continue to take the same dose of levothyroxine.    4. Anemia   F/up ferritin level, given her h/o gastric by pass.     Orders Placed This Encounter   Procedures     Renin activity     Cortisol     Adrenal corticotropin     Basic metabolic panel     Cortisol free urine     Creatinine timed urine     RTC 6 months.

## 2019-12-03 ENCOUNTER — OFFICE VISIT - HEALTHEAST (OUTPATIENT)
Dept: FAMILY MEDICINE | Facility: CLINIC | Age: 34
End: 2019-12-03

## 2019-12-03 DIAGNOSIS — R30.0 DYSURIA: ICD-10-CM

## 2019-12-03 DIAGNOSIS — F41.9 ANXIETY: ICD-10-CM

## 2019-12-03 LAB
ACTH PLAS-MCNC: <10 PG/ML
ALBUMIN UR-MCNC: NEGATIVE MG/DL
APPEARANCE UR: ABNORMAL
BACTERIA #/AREA URNS HPF: ABNORMAL HPF
BILIRUB UR QL STRIP: NEGATIVE
COLOR UR AUTO: YELLOW
GLUCOSE UR STRIP-MCNC: NEGATIVE MG/DL
HGB UR QL STRIP: ABNORMAL
KETONES UR STRIP-MCNC: NEGATIVE MG/DL
LEUKOCYTE ESTERASE UR QL STRIP: ABNORMAL
NITRATE UR QL: POSITIVE
PH UR STRIP: 7 [PH] (ref 5–8)
RBC #/AREA URNS AUTO: ABNORMAL HPF
SP GR UR STRIP: 1.01 (ref 1–1.03)
SQUAMOUS #/AREA URNS AUTO: ABNORMAL LPF
UROBILINOGEN UR STRIP-ACNC: ABNORMAL
WBC #/AREA URNS AUTO: ABNORMAL HPF

## 2019-12-03 ASSESSMENT — MIFFLIN-ST. JEOR: SCORE: 1346.32

## 2019-12-04 ENCOUNTER — COMMUNICATION - HEALTHEAST (OUTPATIENT)
Dept: FAMILY MEDICINE | Facility: CLINIC | Age: 34
End: 2019-12-04

## 2019-12-04 DIAGNOSIS — N39.0 URINARY TRACT INFECTION WITHOUT HEMATURIA, SITE UNSPECIFIED: ICD-10-CM

## 2019-12-04 LAB — RENIN PLAS-CCNC: 1.8 NG/ML/HR

## 2019-12-05 LAB — BACTERIA SPEC CULT: ABNORMAL

## 2019-12-06 NOTE — PROGRESS NOTES
Patient called.  Potassium level lowish.  Advised to have a 24 hrs urinary cortisol checked, to determine whether or not to cut back on hydrocortisone or Florinef.  Ferritin level low, associated with anemia.  Advised to take Vitron C, 2 to 3 tablets daily, and have ferritin and hemoglobin rechecked in 2 or 3 months.  I am also going to check other vitamins levels, given her h/o gastric bypass. She reports taking a MVI patch - Nutripatch, for 2-3 months, recommended by her bariatric surgeon.

## 2019-12-10 ENCOUNTER — TELEPHONE (OUTPATIENT)
Dept: ONCOLOGY | Facility: CLINIC | Age: 34
End: 2019-12-10

## 2019-12-10 DIAGNOSIS — E27.40 ADRENAL INSUFFICIENCY (H): ICD-10-CM

## 2019-12-10 DIAGNOSIS — C74.01 ADRENAL CORTEX CANCER, RIGHT (H): ICD-10-CM

## 2019-12-10 NOTE — TELEPHONE ENCOUNTER
Spoke with Suzy re: her request to move up her RTC appt earlier than 12/20. Suzy states she moved up her scan apts to 12/12 d/t her concern about her persistent cough. Suzy states the cough has been ongoing for approximately 2 months. States she is not treating it at this time, and has no other symptoms other than occasional SOB. She states Dr. Cintron is aware of the cough as it was discussed at her last RTC visit in November.     Offered to move Suzy's RTC appt up to 12/17 with Dr. Cintron. Suzy was in agreement with plan.     Encouraged Suzy to call with any additional questions or concerns or change/new onset of symptoms. Suzy verbalized understanding.     Yvonne Begum, LCN, RN  RN Care Coordinator  Dr. Cintron

## 2019-12-11 DIAGNOSIS — C74.01 MALIGNANT NEOPLASM OF CORTEX OF RIGHT ADRENAL GLAND (H): Primary | ICD-10-CM

## 2019-12-11 RX ORDER — FLUDROCORTISONE ACETATE 0.1 MG/1
0.1 TABLET ORAL DAILY
Qty: 90 TABLET | Refills: 3 | Status: SHIPPED | OUTPATIENT
Start: 2019-12-11 | End: 2020-12-06

## 2019-12-11 NOTE — TELEPHONE ENCOUNTER
FLUDROCORTISONE ACETATE 0.1MG TABS      Last Written Prescription Date:  2/18/19  Last Fill Quantity: 45,   # refills: 3  Last Office Visit : 12/2/19  Future Office visit:  6/8/20    Routing refill request to provider for review/approval because:  Inconsistent directions  At last visit patient reported taking 0.1 mg daily, prescription is for 0.05 mg daily

## 2019-12-11 NOTE — TELEPHONE ENCOUNTER
"Sent to provider for review related to progress notes of 12/02/2019: \"Progress Notes   Veena Jaquez MD (Physician)     Internal Medicine      Patient called.  Potassium level lowish.  Advised to have a 24 hrs urinary cortisol checked, to determine whether or not to cut back on hydrocortisone or Florinef.  Ferritin level low, associated with anemia.  Advised to take Vitron C, 2 to 3 tablets daily, and have ferritin and hemoglobin rechecked in 2 or 3 months.  I am also going to check other vitamins levels, given her h/o gastric bypass. She reports taking a MVI patch - Nutripatch, for 2-3 months, recommended by her bariatric surgeon.        And Rx order from: 10/25/2019 last refill.   Name from pharmacy: FLUDROCORTISONE ACETATE 0.1MG TABS          Will file in chart as: fludrocortisone (FLORINEF) 0.1 MG tablet         Sig: Take 0.5 tablets (0.05 mg) by mouth daily    Original sig: TAKE ONE-HALF TABLET BY MOUTH EVERY DAY    Disp:  45 tablet    Refills:  3    Start: 12/11/2019    Class: E-Prescribe    For: Adrenal cortex cancer, right (H); Adrenal insufficiency (H)    Last ordered: 9 months ago by Veena Jaquez MD Last refill: 10/25/2019    Rx #: [id number]       Ivon Silverman RN on 12/11/2019 at 1:03 PM     "

## 2019-12-12 ENCOUNTER — ANCILLARY PROCEDURE (OUTPATIENT)
Dept: CT IMAGING | Facility: CLINIC | Age: 34
End: 2019-12-12
Attending: INTERNAL MEDICINE
Payer: COMMERCIAL

## 2019-12-12 ENCOUNTER — HOSPITAL ENCOUNTER (OUTPATIENT)
Facility: CLINIC | Age: 34
Setting detail: SPECIMEN
Discharge: HOME | End: 2019-12-12
Admitting: INTERNAL MEDICINE
Payer: COMMERCIAL

## 2019-12-12 DIAGNOSIS — C74.01 MALIGNANT NEOPLASM OF CORTEX OF RIGHT ADRENAL GLAND (H): ICD-10-CM

## 2019-12-12 DIAGNOSIS — Z98.84 GASTRIC BYPASS STATUS FOR OBESITY: ICD-10-CM

## 2019-12-12 DIAGNOSIS — C74.01 ADRENAL CORTEX CANCER, RIGHT (H): ICD-10-CM

## 2019-12-12 LAB
ALBUMIN SERPL-MCNC: 3 G/DL (ref 3.4–5)
ALP SERPL-CCNC: 103 U/L (ref 40–150)
ALT SERPL W P-5'-P-CCNC: 21 U/L (ref 0–50)
ANION GAP SERPL CALCULATED.3IONS-SCNC: 4 MMOL/L (ref 3–14)
AST SERPL W P-5'-P-CCNC: 14 U/L (ref 0–45)
BASOPHILS # BLD AUTO: 0 10E9/L (ref 0–0.2)
BASOPHILS NFR BLD AUTO: 0.9 %
BILIRUB SERPL-MCNC: 0.2 MG/DL (ref 0.2–1.3)
BUN SERPL-MCNC: 11 MG/DL (ref 7–30)
CALCIUM SERPL-MCNC: 7.9 MG/DL (ref 8.5–10.1)
CHLORIDE SERPL-SCNC: 108 MMOL/L (ref 94–109)
CHOLEST SERPL-MCNC: 193 MG/DL
CO2 SERPL-SCNC: 29 MMOL/L (ref 20–32)
CREAT SERPL-MCNC: 0.73 MG/DL (ref 0.52–1.04)
DIFFERENTIAL METHOD BLD: ABNORMAL
EOSINOPHIL # BLD AUTO: 0 10E9/L (ref 0–0.7)
EOSINOPHIL NFR BLD AUTO: 0.3 %
ERYTHROCYTE [DISTWIDTH] IN BLOOD BY AUTOMATED COUNT: 15.8 % (ref 10–15)
GFR SERPL CREATININE-BSD FRML MDRD: >90 ML/MIN/{1.73_M2}
GLUCOSE SERPL-MCNC: 108 MG/DL (ref 70–99)
HCT VFR BLD AUTO: 36.7 % (ref 35–47)
HDLC SERPL-MCNC: 106 MG/DL
HGB BLD-MCNC: 10.7 G/DL (ref 11.7–15.7)
IMM GRANULOCYTES # BLD: 0 10E9/L (ref 0–0.4)
IMM GRANULOCYTES NFR BLD: 0.3 %
LDLC SERPL CALC-MCNC: 72 MG/DL
LYMPHOCYTES # BLD AUTO: 0.6 10E9/L (ref 0.8–5.3)
LYMPHOCYTES NFR BLD AUTO: 16.9 %
MCH RBC QN AUTO: 23.2 PG (ref 26.5–33)
MCHC RBC AUTO-ENTMCNC: 29.2 G/DL (ref 31.5–36.5)
MCV RBC AUTO: 79 FL (ref 78–100)
MONOCYTES # BLD AUTO: 0.3 10E9/L (ref 0–1.3)
MONOCYTES NFR BLD AUTO: 7.2 %
NEUTROPHILS # BLD AUTO: 2.6 10E9/L (ref 1.6–8.3)
NEUTROPHILS NFR BLD AUTO: 74.4 %
NONHDLC SERPL-MCNC: 88 MG/DL
NRBC # BLD AUTO: 0 10*3/UL
NRBC BLD AUTO-RTO: 0 /100
PLATELET # BLD AUTO: 239 10E9/L (ref 150–450)
POTASSIUM SERPL-SCNC: 4.2 MMOL/L (ref 3.4–5.3)
PROT SERPL-MCNC: 6.1 G/DL (ref 6.8–8.8)
PTH-INTACT SERPL-MCNC: 127 PG/ML (ref 18–80)
RBC # BLD AUTO: 4.62 10E12/L (ref 3.8–5.2)
SODIUM SERPL-SCNC: 140 MMOL/L (ref 133–144)
T4 FREE SERPL-MCNC: 0.95 NG/DL (ref 0.76–1.46)
TRIGL SERPL-MCNC: 79 MG/DL
TSH SERPL DL<=0.005 MIU/L-ACNC: 0.09 MU/L (ref 0.4–4)
VIT B12 SERPL-MCNC: 488 PG/ML (ref 193–986)
WBC # BLD AUTO: 3.5 10E9/L (ref 4–11)

## 2019-12-12 PROCEDURE — 84443 ASSAY THYROID STIM HORMONE: CPT | Performed by: INTERNAL MEDICINE

## 2019-12-12 PROCEDURE — 36415 COLL VENOUS BLD VENIPUNCTURE: CPT | Performed by: INTERNAL MEDICINE

## 2019-12-12 PROCEDURE — 80061 LIPID PANEL: CPT | Performed by: INTERNAL MEDICINE

## 2019-12-12 PROCEDURE — 80053 COMPREHEN METABOLIC PANEL: CPT | Performed by: INTERNAL MEDICINE

## 2019-12-12 PROCEDURE — 83970 ASSAY OF PARATHORMONE: CPT | Performed by: INTERNAL MEDICINE

## 2019-12-12 PROCEDURE — 82607 VITAMIN B-12: CPT | Performed by: INTERNAL MEDICINE

## 2019-12-12 PROCEDURE — 84425 ASSAY OF VITAMIN B-1: CPT | Performed by: INTERNAL MEDICINE

## 2019-12-12 PROCEDURE — 82306 VITAMIN D 25 HYDROXY: CPT | Performed by: INTERNAL MEDICINE

## 2019-12-12 PROCEDURE — 85025 COMPLETE CBC W/AUTO DIFF WBC: CPT | Performed by: INTERNAL MEDICINE

## 2019-12-12 PROCEDURE — 80375 DRUG/SUBSTANCE NOS 1-3: CPT | Performed by: INTERNAL MEDICINE

## 2019-12-12 PROCEDURE — 84439 ASSAY OF FREE THYROXINE: CPT | Performed by: INTERNAL MEDICINE

## 2019-12-12 PROCEDURE — 84590 ASSAY OF VITAMIN A: CPT | Performed by: INTERNAL MEDICINE

## 2019-12-12 PROCEDURE — 82627 DEHYDROEPIANDROSTERONE: CPT | Performed by: INTERNAL MEDICINE

## 2019-12-12 PROCEDURE — 84999 UNLISTED CHEMISTRY PROCEDURE: CPT | Performed by: INTERNAL MEDICINE

## 2019-12-12 RX ORDER — IOPAMIDOL 755 MG/ML
72 INJECTION, SOLUTION INTRAVASCULAR ONCE
Status: COMPLETED | OUTPATIENT
Start: 2019-12-12 | End: 2019-12-12

## 2019-12-12 RX ADMIN — IOPAMIDOL 72 ML: 755 INJECTION, SOLUTION INTRAVASCULAR at 17:37

## 2019-12-13 LAB
DEPRECATED CALCIDIOL+CALCIFEROL SERPL-MC: <48 UG/L (ref 20–75)
DHEA-S SERPL-MCNC: <15 UG/DL (ref 35–430)
MISCELLANEOUS TEST: NORMAL
VITAMIN D2 SERPL-MCNC: <5 UG/L
VITAMIN D3 SERPL-MCNC: 43 UG/L

## 2019-12-15 ENCOUNTER — MYC MEDICAL ADVICE (OUTPATIENT)
Dept: ENDOCRINOLOGY | Facility: CLINIC | Age: 34
End: 2019-12-15

## 2019-12-15 DIAGNOSIS — Z98.84 GASTRIC BYPASS STATUS FOR OBESITY: Primary | ICD-10-CM

## 2019-12-16 LAB
ANNOTATION COMMENT IMP: NORMAL
RETINYL PALMITATE SERPL-MCNC: 0.03 MG/L (ref 0–0.1)
VIT A SERPL-MCNC: 0.58 MG/L (ref 0.3–1.2)

## 2019-12-16 RX ORDER — CALCIUM CARBONATE 500(1250)
1 TABLET ORAL 2 TIMES DAILY
Qty: 180 TABLET | Refills: 3 | Status: SHIPPED | OUTPATIENT
Start: 2019-12-16 | End: 2020-12-06

## 2019-12-16 NOTE — PROGRESS NOTES
Patient called.  I suspect the hyperparathyroidism is a consequence of lowish calcium levels.  The Nutri patch multivitamin delivers 5000 units vitamin D and 500 mg calcium daily.  The patient does not drink milk and she occasionally has yogurt or cheese.  I recommended to add 500 mg Ca twice daily to the current dose of calcium she gets from multivitamins.  The plan is to recheck the parathyroid hormone levels in 2 or 3 months.  I also advised the patient to try to take the levothyroxine 1 hour prior to breakfast, as this may improve the free T4 levels towards the upper end of normal range (currently, the patient takes levothyroxine after breakfast).

## 2019-12-17 ENCOUNTER — RECORDS - HEALTHEAST (OUTPATIENT)
Dept: ADMINISTRATIVE | Facility: OTHER | Age: 34
End: 2019-12-17

## 2019-12-17 ENCOUNTER — ONCOLOGY VISIT (OUTPATIENT)
Dept: ONCOLOGY | Facility: CLINIC | Age: 34
End: 2019-12-17
Attending: INTERNAL MEDICINE
Payer: COMMERCIAL

## 2019-12-17 VITALS
BODY MASS INDEX: 25.37 KG/M2 | HEIGHT: 64 IN | HEART RATE: 96 BPM | RESPIRATION RATE: 16 BRPM | TEMPERATURE: 98.6 F | OXYGEN SATURATION: 98 % | SYSTOLIC BLOOD PRESSURE: 112 MMHG | DIASTOLIC BLOOD PRESSURE: 75 MMHG | WEIGHT: 148.6 LBS

## 2019-12-17 DIAGNOSIS — F41.1 GENERALIZED ANXIETY DISORDER: Primary | ICD-10-CM

## 2019-12-17 LAB — VIT B1 SERPL-MCNC: 4 NMOL/L (ref 4–15)

## 2019-12-17 PROCEDURE — G0463 HOSPITAL OUTPT CLINIC VISIT: HCPCS | Mod: ZF

## 2019-12-17 PROCEDURE — 99214 OFFICE O/P EST MOD 30 MIN: CPT | Mod: ZP | Performed by: INTERNAL MEDICINE

## 2019-12-17 ASSESSMENT — MIFFLIN-ST. JEOR: SCORE: 1354.05

## 2019-12-17 ASSESSMENT — PAIN SCALES - GENERAL: PAINLEVEL: NO PAIN (0)

## 2019-12-17 NOTE — LETTER
12/17/2019       RE: Suzy Rodríguez  [address]  [facility] [place] [address]     Dear Colleague,    Thank you for referring your patient, Suzy Rodríguez, to the [facility]. Please see a copy of my visit note below.    MEDICAL ONCOLOGY CLINIC NOTE    REFERRING PROVIDER: Warren Mansfield MD from [facility] Urologic Oncology clinic.     REASON FOR CURRENT VISIT: Follow-up while on mitotane as adjuvant therapy of adrenocortical carcinoma.      HISTORY OF PRESENT ILLNESS: Ms. Rodríguez is a 34-year-old lady with history notable for right-sided functioning adrenocortical carcinoma (diagnosed in May 2018), who is here for follow-up while on adjuvant mitotane. Her oncologic history is detailed as under.     Suzy is doing better since the last visit. She had developed significant, severe fatigue, slurred speech, muscle pain, insomnia, and tremors since being on the higher dose of mitotane 1500mg AM - 1500mg afternoon and 1000mg HS. These has improved since dropping the dose to 1000mg BID and giving a brief treatment interruption. Now with only occasional slurred speech. Non-intentional low-amplitude tremors have significantly improved. Also on hydrocortisone 30mg QAM, 20 early PM. No recurrence of episode of dizziness/LOC. No history of CVA/TIA/palpitations/CP/SOA etc. Using Compazine 10mg BID for chronic, mitotane-associated nausea. No diarrhea/abd pain/bloody BM. She has 1-2 lose BMs a day and uses Imodium PRN. Using trazodone for sleep sometimes.     Last visit with Dr. Hilton at [facility] was on 9/24/19 and mitotane level came back at 18.8. She also follows with Dr. Veena Jaquez in our endocrinology department.    No clinical evidence of disease recurrence.     ONCOLOGIC HISTORY:   1. Right adrenocortical carcinoma, localized, high-risk (Ki67 20%; adrenal capsular invasion present, mitotic rate 15 per 50 HPF):  - Presented to emergency room on 5/8/2018 with acute onset left flank  "pain.   - CT abdomen and pelvis stone protocol without contrast 5 8302 showed \"9.2 x 8 cm heterogeneous right upper quadrant mass with small foci of calcification within it which is likely arising from the adrenal gland though inseparable from liver and upper pole of right kidney. It is incompletely evaluated on this noncontrast study. 3 x 2.6 cm mass right lobe of liver on image #85 also incompletely evaluated. 6 x 4 x 4 mm upper third left ureteral obstructing stone with mild to moderate secondary hydronephrosis. Collection of stones at lower pole left kidney extending over an area of approximately 6 x 7 x 4 mm. Additional nonobstructing 1 mm stone upper pole left kidney. 2 mm nonobstructing stone noted upper pole right kidney with no hydronephrosis on the right. Postop change consistent with gastric bypass. Noncontrast images of spleen, left adrenal gland, gallbladder, and pancreas unremarkable. No aneurysm. No adenopathy.\"  - Seen by Dr. Delvalle at [facility] Urology clinic. Referred to Dr. Alvino Patel and then Dr. Warren Mansfield.  - Endocrinology team directed workup showed high DHEAS level of 740 pre-surgery.   - Right open adrenalectomy and hepatic mobilization performed by Dr. Warren Mansfield on 6/25/2018. Pathology showed adrenocortical carcinoma measuring 11.3 cm, weighing 413 g with extension into or through the adrenal capsule negative lymphovascular invasion, tumor necrosis present, margins involved by tumor, no regional lymph nodes identified, pathologic stage T2 NX.  - DHEAS normalized post-surgery.   - Adjuvant Mitotane started on 7/24/18. Dose increased to 2000mg TID around 10/23/18 due to persistently low troughs. Held 1/16/19 for two weeks and resumed 1/30 at 1000 mg po BID due to very poor tolerance of higher doses. Highest mitotane level was 10.2 on 2/11/19. Was taking 1500 AM-1500 afternoon-1000mg PM June-Aug 2019, but unable to tolerate. Dose reduced to 1000mg TID as of 08/14/19. " "Dose interrupted on 9/25/19 by Dr. Hilton due to severe side effects. Plan to resume therapy at 1000mg BID in mid Oct.  - Saw radiation oncology at [facility], radiation oncology at [facility], as well as endocrine oncology (Dr. Hilton) at [facility].   - S/p adjuvant RT by Dr. Monsivais - 5040 cGy over 30 fr (8/24/18-10/6/18).  - 2/11/19: CT C/A/P and MRI brain with contrast - no evidence of disease recurrence.  - 05/07/19: CT C/A/P with contrast - \"1. No evidence for locally recurrent disease. 2. No evidence for metastatic disease in the chest, abdomen, or pelvis. 3. A 2.5 x 2.2 cm peripherally enhancing mass in hepatic segment 5, unchanged enhancing likely hemangioma given the peripheral discontinuous enhancement on previous examination. No other suspicious liver lesions.\"  - 08/14/19: CT C/A/P with contrast - AFUA.  - 11/5/19: CT C/A/P with contrast - \"1. New tiny (2mm) right upper lobe pulmonary nodule of questionable clinical significance. Recommend further attention to this at imaging follow-up.  2. No other new disease identified. 3. New large left ovarian cyst. Resolution of the prominent right ovarian cyst described on the prior exam.\"  - 12/12/19: CT Chest with contrast - stable 1mm lung nodule. No evidence of metastatic disease.    REVIEW OF SYSTEMS: 14 point ROS negative other than the symptoms noted above in the HPI.    PAST MEDICAL AND SURGICAL HISTORY: Reviewed today.  1. Right-sided adrenocortical carcinoma.  2. MHTFR mutation with h/o mutiple miscarriages.  3. Ovarian cysts diagnosed in 2011.  4. H/o gastric bypass in 2016 for obesity.    SOCIAL HISTORY:   Social History     Tobacco Use     Smoking status: Never Smoker     Smokeless tobacco: Never Used   Substance Use Topics     Alcohol use: Yes     Comment: occ.     Drug use: No     FAMILY HISTORY:   No clustering of malignancies.    ALLERGIES:   Allergies   Allergen Reactions     Bee Venom Swelling     Ibuprofen " Hives, Swelling and Other (See Comments)     CURRENT MEDICATIONS:   Current Outpatient Medications:      acetaminophen (TYLENOL) 325 MG tablet, Take 2 tablets (650 mg) by mouth every 4 hours as needed for other (multimodal surgical pain management along with NSAIDS and opioid medication as indicated based on pain control and physical function.), Disp: 150 tablet, Rfl: 0     buPROPion (WELLBUTRIN) 100 MG tablet, TAKE 1 TABLET (100 MG TOTAL) BY MOUTH 2 (TWO) TIMES A DAY., Disp: , Rfl: 3     calcium carbonate (OS-SHASHA) 500 MG tablet, Take 1 tablet (500 mg) by mouth 2 times daily, Disp: 180 tablet, Rfl: 3     calcium carbonate antacid 1000 MG CHEW, Take 1 tablet by mouth every morning , Disp: , Rfl:      cholecalciferol (VITAMIN D-1000 MAX ST) 1000 units TABS, Take 2,000 Units by mouth every morning , Disp: , Rfl:      cyclobenzaprine (FLEXERIL) 5 MG tablet, Take 5-10 mg by mouth, Disp: , Rfl:      diazepam (VALIUM) 5 MG tablet, Take 1 tablet (5 mg) by mouth once as needed for anxiety (MRI claustrophobia management), Disp: 2 tablet, Rfl: 3     diphenoxylate-atropine (LOMOTIL) 2.5-0.025 MG tablet, Take 1 tablet by mouth 3 times daily as needed for diarrhea, Disp: 90 tablet, Rfl: 3     EPINEPHrine (EPIPEN/ADRENACLICK/OR ANY BX GENERIC EQUIV) 0.3 MG/0.3ML injection 2-pack, As directed for bee stings, Disp: , Rfl:      ferrous sulfate (IRON) 325 (65 Fe) MG tablet, Take 325 mg by mouth daily, Disp: , Rfl:      fludrocortisone (FLORINEF) 0.1 MG tablet, Take 1 tablet (0.1 mg) by mouth daily, Disp: 90 tablet, Rfl: 3     FLUoxetine (PROZAC) 40 MG capsule, Take 40 mg by mouth daily, Disp: , Rfl:      gabapentin (NEURONTIN) 300 MG capsule, Take 300 mg by mouth 3 times daily, Disp: , Rfl:      hydrocortisone (CORTEF) 10 MG tablet, Take 3 pills (=30 mg) in AM and 2 pills (=20 mg) at 2 pm daily, additional as directed., Disp: 180 tablet, Rfl: 11     levothyroxine (SYNTHROID/LEVOTHROID) 125 MCG tablet, Take 1 tablet (125 mcg) by mouth  "daily, Disp: 90 tablet, Rfl: 3     mitotane (LYSODREN) 500 MG tablet, Take 2 tablets (1,000 mg) by mouth 2 times daily, Disp: 120 tablet, Rfl: 0     Multiple Vitamins-Calcium (ONE-A-DAY WOMENS PO), Take 2 tablets by mouth every morning , Disp: , Rfl:      pantoprazole (PROTONIX) 40 MG EC tablet, Take 1 tablet (40 mg) by mouth daily, Disp: 30 tablet, Rfl: 0     prochlorperazine (COMPAZINE) 5 MG tablet, Take 1 tablet (5 mg) by mouth every 8 hours as needed for nausea or vomiting, Disp: 90 tablet, Rfl: 3     traZODone (DESYREL) 50 MG tablet, Take 2 tablets (100 mg) by mouth nightly as needed for sleep, Disp: 60 tablet, Rfl: 0     UNABLE TO FIND, medical cannabis (Patient's own supply. Not a prescription), Disp: , Rfl:      ferrous fumarate 65 mg, [place]. FE,-Vitamin C 125 mg (VITRON-C)  MG TABS tablet, Take 1 tablet by mouth 3 times daily (Patient not taking: Reported on 12/17/2019), Disp: 270 tablet, Rfl: 1    PHYSICAL EXAMINATION:  Vital signs: /75   Pulse 96   Temp 98.6  F (37  C) (Oral)   Resp 16   Ht 1.626 m (5' 4\")   Wt 67.4 kg (148 lb 9.6 oz)   SpO2 98%   BMI 25.51 kg/m     ECOG performance status of 2.   GENERAL: Young lady, sitting in chair. Appears tremulous and fatigued, but in no acute distress.  HEENT: No icterus, no pallor. MMM, PERRL, OP clear.   NECK: Supple, no JVD/LAD.  LUNGS: CTAB, normal WOB on RA.  CARDIOVASCULAR: Regular rate and rhythm, no murmurs.   ABDOMEN: Well-healing posterior right sided abdominal incision without concerning findings. Soft, NT, ND, no masses appreciated, normal BS.   EXTREMITIES: No cyanosis, no clubbing, no edema.   NEUROLOGIC: Alert and fully oriented. Hands and feet are tremulous.   PSYCH: Affect flat, mood slightly anxious/depressed.    LABORATORY DATA:   Lab Test 12/12/19  1715 12/02/19  1025 11/05/19  1349 08/14/19  1050   WBC 3.5*  --  3.7* 4.8   RBC 4.62  --  4.12 4.65   HGB 10.7*  --  9.7* 11.4*   HCT 36.7  --  33.2* 37.9   MCV 79  --  81 82   MCH " 23.2*  --  23.5* 24.5*   MCHC 29.2*  --  29.2* 30.1*   RDW 15.8*  --  15.5* 14.8     --  160 244   NEUTROPHIL 74.4  --  61.0 67.3    135 137 140   POTASSIUM 4.2 3.3* 4.0 3.6   CHLORIDE 108 103 105 106   CO2 29 28 27 28   ANIONGAP 4 4 6 5   * 65* 89 85   BUN 11 8 10 10   CR 0.73 0.70 0.80 0.72   SHASHA 7.9* 7.8* 7.3* 7.8*   PROTTOTAL 6.1*  --  5.2* 6.0*   ALBUMIN 3.0*  --  2.4* 2.9*   BILITOTAL 0.2  --  0.2 0.2   ALKPHOS 103  --  90 110   AST 14  --  14 16   ALT 21  --  18 25     Lab Test 12/12/19  1715 11/05/19  1349 08/14/19  1050 07/17/19  1241 06/18/19  1230   TSH 0.09* 0.11* 0.04* 0.04* 0.09*   T4 0.95 0.98 0.92 1.14 1.00     Lab Results   Component Value Date    CHOL 193 12/12/2019    CHOL 164 11/05/2019     Lab Results   Component Value Date     12/12/2019    HDL 84 11/05/2019     Lab Results   Component Value Date    LDL 72 12/12/2019    LDL 48 11/05/2019     Lab Results   Component Value Date    TRIG 79 12/12/2019    TRIG 164 11/05/2019     DHEAS was 740 on 6/5/18, and has been <15 on 7/12/18, 8/20/18, 9/19/18, 10/23/18, 11/27/18, 1/9/19, 5/8/19, 6/18/19, 7/17/19, 11/15/19, 12/12/19.  Mitotane level (target 14-20): 1.8ng/dl on 8/20/18, 3.5 on 10/25/18, 6.2 on 12/4/18, 8.1 on 1/9/19 and 10.2 on 2/11/19, 14.2 on 6/18/19. Pending from 12/12/19.  Labs from [facility] 9/24/19 - WBC 4200, ANC 2600, Hb 10.4, Platelets 218K, lytes WNL, creat 0.61, Calcium 8, albumin 3.5, LFTs normal, mitotane level 18.8, cortisol 25.8, ACTH<5, aldosterone 4.9, free T4 1.17, TSH 0.07, DHEAS <15, renin plasma 10.6.     IMAGING STUDIES:  As above.    ASSESSMENT AND PLAN: A 34-year-old lady with right-sided functioning adrenocortical carcinoma, who is here for follow-up while on adjuvant mitotane.     Adrenocortical carcinoma:  - Started on adjuvant mitotane in July 2018 based on her presentation and tumor characteristics including invasion of the adrenal capsule, high mitotic rate, possibly positive margins, and high  "Ki67, which could result in a rate of recurrence as high as 40%.    - Restaging CT chest from today continues to show no evidence of disease recurrence and that new 2mm nodule is likely benign.   - Despite multiple supportive care interventions, patient has NOT been able to tolerate higher doses (above 2-3gm/day) of mitotane. Now doing better on 1000mg BID. Will continue.  - We've had numerous conversations regarding goals of therapy and that mitotane doesn't work on an \"all or none\" basis with no activity below therapeutic level. In her case, it is critical to not aim for a number but to actually aim for optimizing tolerance to therapy to allow continuation for another 4 years. Suzy and her  are in agreement.   - Monthly Mitotane level, CBC, comprehensive panel, TSH, FT4, cholesterol panel, 24-hour Urine Free Cortisol, and DHEAS.   - Plan to continue adjuvant Mitotane for up to 5 yrs if tolerated.  - Next restaging CT C/A/P in approx 2 months.    Concern for ativan use disorder:  - I counseled her of the addictive potential of Ativan and she did agree that she has felt increasingly desiring this medication.   - We have stopped Ativan and I would encourage other medical providers to not give any further refills.  - Discussed health coping strategies including cognitive behavioral interventions. Seen by our psychology team as well. Will setup f/up visit.    Insomnia:  - Trazodone is working well but she's taking 100mg at bedtime. Will give refills PRN.   - EKG with QTc 488 ms in April.    Endocrinopathies:  - Mgmt per endocrine team at the . Follow-up planned in the next couple months.    Multifocal macroovarian cysts:  - These can happen with mitotane and are likely not cancerous.   - Managed by Gyn.     Return to see me in 4 weeks with labs. Patient and family given opportunity to ask questions, which were answered to their satisfaction. They're in complete agreement as planned.    BILLING: " [id number].     Benson Cintron M.D.  . Professor of Medicine  Genitourinary Oncology  Division of Hematology, Oncology & Transplantation  [facility]

## 2019-12-17 NOTE — NURSING NOTE
"Oncology Rooming Note    December 17, 2019 4:01 PM   Suzy Rodríguez is a 34 year old female who presents for:    Chief Complaint   Patient presents with     Oncology Clinic Visit     RETURN VISIT; ADRENAL CORTICAL CANCER; VITALS TAKEN      Initial Vitals: /75   Pulse 96   Temp 98.6  F (37  C) (Oral)   Resp 16   Ht 1.626 m (5' 4\")   Wt 67.4 kg (148 lb 9.6 oz)   SpO2 98%   BMI 25.51 kg/m   Estimated body mass index is 25.51 kg/m  as calculated from the following:    Height as of this encounter: 1.626 m (5' 4\").    Weight as of this encounter: 67.4 kg (148 lb 9.6 oz). Body surface area is 1.74 meters squared.  No Pain (0) Comment: Data Unavailable   No LMP recorded. (Menstrual status: IUD).  Allergies reviewed: Yes  Medications reviewed: Yes    Medications: Medication refills not needed today.  Pharmacy name entered into [facility]: [facility] [place], [place] - [address]    Clinical concerns: No new concerns today  Dr Cintron was notified.      Miya Emery              "

## 2019-12-18 NOTE — TELEPHONE ENCOUNTER
ONCOLOGY INTAKE: Records Information      APPT INFORMATION:  Referring provider: Dr. Benson Cintron MD  Referring provider s clinic:  [facility]  Reason for visit/diagnosis:  Adrenal Carcinoma   Has patient been notified of appointment date and time?: Yes    RECORDS INFORMATION:  Were the records received with the referral (via [employer])? No,Internal Referral      Has patient been seen for any external appt for this diagnosis? No

## 2019-12-18 NOTE — PROGRESS NOTES
"MEDICAL ONCOLOGY CLINIC NOTE    REFERRING PROVIDER: Warren Mansfield MD from [facility] Urologic Oncology clinic.     REASON FOR CURRENT VISIT: Follow-up while on mitotane as adjuvant therapy of adrenocortical carcinoma.      HISTORY OF PRESENT ILLNESS: Ms. Rodríguez is a 34-year-old lady with history notable for right-sided functioning adrenocortical carcinoma (diagnosed in May 2018), who is here for follow-up while on adjuvant mitotane. Her oncologic history is detailed as under.     Suzy is doing better since the last visit. She had developed significant, severe fatigue, slurred speech, muscle pain, insomnia, and tremors since being on the higher dose of mitotane 1500mg AM - 1500mg afternoon and 1000mg HS. These has improved since dropping the dose to 1000mg BID and giving a brief treatment interruption. Now with only occasional slurred speech. Non-intentional low-amplitude tremors have significantly improved. Also on hydrocortisone 30mg QAM, 20 early PM. No recurrence of episode of dizziness/LOC. No history of CVA/TIA/palpitations/CP/SOA etc. Using Compazine 10mg BID for chronic, mitotane-associated nausea. No diarrhea/abd pain/bloody BM. She has 1-2 lose BMs a day and uses Imodium PRN. Using trazodone for sleep sometimes.     Last visit with Dr. Hilton at [facility] was on 9/24/19 and mitotane level came back at 18.8. She also follows with Dr. Veena Jaquez in our endocrinology department.    No clinical evidence of disease recurrence.     ONCOLOGIC HISTORY:   1. 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   - CT abdomen and pelvis stone protocol without contrast 5 1921 showed \"9.2 x 8 cm heterogeneous right upper quadrant mass with small foci of calcification within it which is likely arising from the adrenal gland though inseparable " "from liver and upper pole of right kidney. It is incompletely evaluated on this noncontrast study. 3 x 2.6 cm mass right lobe of liver on image #85 also incompletely evaluated. 6 x 4 x 4 mm upper third left ureteral obstructing stone with mild to moderate secondary hydronephrosis. Collection of stones at lower pole left kidney extending over an area of approximately 6 x 7 x 4 mm. Additional nonobstructing 1 mm stone upper pole left kidney. 2 mm nonobstructing stone noted upper pole right kidney with no hydronephrosis on the right. Postop change consistent with gastric bypass. Noncontrast images of spleen, left adrenal gland, gallbladder, and pancreas unremarkable. No aneurysm. No adenopathy.\"  - Seen by Dr. Delvalle at [facility]. Referred to Dr. Alvino Patel and then Dr. Warren Mansfield.  - Endocrinology team directed workup showed high DHEAS level of 740 pre-surgery.   - Right open adrenalectomy and hepatic mobilization performed by Dr. Warren Mansfield on 6/25/2018. Pathology showed adrenocortical carcinoma measuring 11.3 cm, weighing 413 g with extension into or through the adrenal capsule negative lymphovascular invasion, tumor necrosis present, margins involved by tumor, no regional lymph nodes identified, pathologic stage T2 NX.  - DHEAS normalized post-surgery.   - Adjuvant Mitotane started on 7/24/18. Dose increased to 2000mg TID around 10/23/18 due to persistently low troughs. Held 1/16/19 for two weeks and resumed 1/30 at 1000 mg po BID due to very poor tolerance of higher doses. Highest mitotane level was 10.2 on 2/11/19. Was taking 1500 AM-1500 afternoon-1000mg PM June-Aug 2019, but unable to tolerate. Dose reduced to 1000mg TID as of 08/14/19. Dose interrupted on 9/25/19 by Dr. Hilton due to severe side effects. Plan to resume therapy at 1000mg BID in mid Oct.  - Saw radiation oncology at [facility], radiation oncology at [facility], as well as endocrine " "oncology (Dr. Hilton) at [facility].   - S/p adjuvant RT by Dr. Monsivais - 5040 cGy over 30 fr (8/24/18-10/6/18).  - 2/11/19: CT C/A/P and MRI brain with contrast - no evidence of disease recurrence.  - 05/07/19: CT C/A/P with contrast - \"1. No evidence for locally recurrent disease. 2. No evidence for metastatic disease in the chest, abdomen, or pelvis. 3. A 2.5 x 2.2 cm peripherally enhancing mass in hepatic segment 5, unchanged enhancing likely hemangioma given the peripheral discontinuous enhancement on previous examination. No other suspicious liver lesions.\"  - 08/14/19: CT C/A/P with contrast - AFUA.  - 11/5/19: CT C/A/P with contrast - \"1. New tiny (2mm) right upper lobe pulmonary nodule of questionable clinical significance. Recommend further attention to this at imaging follow-up.  2. No other new disease identified. 3. New large left ovarian cyst. Resolution of the prominent right ovarian cyst described on the prior exam.\"  - 12/12/19: CT Chest with contrast - stable 1mm lung nodule. No evidence of metastatic disease.    REVIEW OF SYSTEMS: 14 point ROS negative other than the symptoms noted above in the HPI.    PAST MEDICAL AND SURGICAL HISTORY: Reviewed today.  1. Right-sided adrenocortical carcinoma.  2. MHTFR mutation with h/o mutiple miscarriages.  3. Ovarian cysts diagnosed in 2011.  4. H/o gastric bypass in 2016 for obesity.    SOCIAL HISTORY:   Social History     Tobacco Use     Smoking status: Never Smoker     Smokeless tobacco: Never Used   Substance Use Topics     Alcohol use: Yes     Comment: occ.     Drug use: No     FAMILY HISTORY:   No clustering of malignancies.    ALLERGIES:   Allergies   Allergen Reactions     Bee Venom Swelling     Ibuprofen Hives, Swelling and Other (See Comments)     CURRENT MEDICATIONS:   Current Outpatient Medications:      acetaminophen (TYLENOL) 325 MG tablet, Take 2 tablets (650 mg) by mouth every 4 hours as needed for other (multimodal surgical pain " management along with NSAIDS and opioid medication as indicated based on pain control and physical function.), Disp: 150 tablet, Rfl: 0     buPROPion (WELLBUTRIN) 100 MG tablet, TAKE 1 TABLET (100 MG TOTAL) BY MOUTH 2 (TWO) TIMES A DAY., Disp: , Rfl: 3     calcium carbonate (OS-SHASHA) 500 MG tablet, Take 1 tablet (500 mg) by mouth 2 times daily, Disp: 180 tablet, Rfl: 3     calcium carbonate antacid 1000 MG CHEW, Take 1 tablet by mouth every morning , Disp: , Rfl:      cholecalciferol (VITAMIN D-1000 MAX ST) 1000 units TABS, Take 2,000 Units by mouth every morning , Disp: , Rfl:      cyclobenzaprine (FLEXERIL) 5 MG tablet, Take 5-10 mg by mouth, Disp: , Rfl:      diazepam (VALIUM) 5 MG tablet, Take 1 tablet (5 mg) by mouth once as needed for anxiety (MRI claustrophobia management), Disp: 2 tablet, Rfl: 3     diphenoxylate-atropine (LOMOTIL) 2.5-0.025 MG tablet, Take 1 tablet by mouth 3 times daily as needed for diarrhea, Disp: 90 tablet, Rfl: 3     EPINEPHrine (EPIPEN/ADRENACLICK/OR ANY BX GENERIC EQUIV) 0.3 MG/0.3ML injection 2-pack, As directed for bee stings, Disp: , Rfl:      ferrous sulfate (IRON) 325 (65 Fe) MG tablet, Take 325 mg by mouth daily, Disp: , Rfl:      fludrocortisone (FLORINEF) 0.1 MG tablet, Take 1 tablet (0.1 mg) by mouth daily, Disp: 90 tablet, Rfl: 3     FLUoxetine (PROZAC) 40 MG capsule, Take 40 mg by mouth daily, Disp: , Rfl:      gabapentin (NEURONTIN) 300 MG capsule, Take 300 mg by mouth 3 times daily, Disp: , Rfl:      hydrocortisone (CORTEF) 10 MG tablet, Take 3 pills (=30 mg) in AM and 2 pills (=20 mg) at 2 pm daily, additional as directed., Disp: 180 tablet, Rfl: 11     levothyroxine (SYNTHROID/LEVOTHROID) 125 MCG tablet, Take 1 tablet (125 mcg) by mouth daily, Disp: 90 tablet, Rfl: 3     mitotane (LYSODREN) 500 MG tablet, Take 2 tablets (1,000 mg) by mouth 2 times daily, Disp: 120 tablet, Rfl: 0     Multiple Vitamins-Calcium (ONE-A-DAY WOMENS PO), Take 2 tablets by mouth every morning ,  "Disp: , Rfl:      pantoprazole (PROTONIX) 40 MG EC tablet, Take 1 tablet (40 mg) by mouth daily, Disp: 30 tablet, Rfl: 0     prochlorperazine (COMPAZINE) 5 MG tablet, Take 1 tablet (5 mg) by mouth every 8 hours as needed for nausea or vomiting, Disp: 90 tablet, Rfl: 3     traZODone (DESYREL) 50 MG tablet, Take 2 tablets (100 mg) by mouth nightly as needed for sleep, Disp: 60 tablet, Rfl: 0     UNABLE TO FIND, medical cannabis (Patient's own supply. Not a prescription), Disp: , Rfl:      ferrous fumarate 65 mg, [place]. FE,-Vitamin C 125 mg (VITRON-C)  MG TABS tablet, Take 1 tablet by mouth 3 times daily (Patient not taking: Reported on 12/17/2019), Disp: 270 tablet, Rfl: 1    PHYSICAL EXAMINATION:  Vital signs: /75   Pulse 96   Temp 98.6  F (37  C) (Oral)   Resp 16   Ht 1.626 m (5' 4\")   Wt 67.4 kg (148 lb 9.6 oz)   SpO2 98%   BMI 25.51 kg/m    ECOG performance status of 2.   GENERAL: Young lady, sitting in chair. Appears tremulous and fatigued, but in no acute distress.  HEENT: No icterus, no pallor. MMM, PERRL, OP clear.   NECK: Supple, no JVD/LAD.  LUNGS: CTAB, normal WOB on RA.  CARDIOVASCULAR: Regular rate and rhythm, no murmurs.   ABDOMEN: Well-healing posterior right sided abdominal incision without concerning findings. Soft, NT, ND, no masses appreciated, normal BS.   EXTREMITIES: No cyanosis, no clubbing, no edema.   NEUROLOGIC: Alert and fully oriented. Hands and feet are tremulous.   PSYCH: Affect flat, mood slightly anxious/depressed.    LABORATORY DATA:   Lab Test 12/12/19  1715 12/02/19  1025 11/05/19  1349 08/14/19  1050   WBC 3.5*  --  3.7* 4.8   RBC 4.62  --  4.12 4.65   HGB 10.7*  --  9.7* 11.4*   HCT 36.7  --  33.2* 37.9   MCV 79  --  81 82   MCH 23.2*  --  23.5* 24.5*   MCHC 29.2*  --  29.2* 30.1*   RDW 15.8*  --  15.5* 14.8     --  160 244   NEUTROPHIL 74.4  --  61.0 67.3    135 137 140   POTASSIUM 4.2 3.3* 4.0 3.6   CHLORIDE 108 103 105 106   CO2 29 28 27 28 "   ANIONGAP 4 4 6 5   * 65* 89 85   BUN 11 8 10 10   CR 0.73 0.70 0.80 0.72   SHASHA 7.9* 7.8* 7.3* 7.8*   PROTTOTAL 6.1*  --  5.2* 6.0*   ALBUMIN 3.0*  --  2.4* 2.9*   BILITOTAL 0.2  --  0.2 0.2   ALKPHOS 103  --  90 110   AST 14  --  14 16   ALT 21  --  18 25     Lab Test 12/12/19  1715 11/05/19  1349 08/14/19  1050 07/17/19  1241 06/18/19  1230   TSH 0.09* 0.11* 0.04* 0.04* 0.09*   T4 0.95 0.98 0.92 1.14 1.00     Lab Results   Component Value Date    CHOL 193 12/12/2019    CHOL 164 11/05/2019     Lab Results   Component Value Date     12/12/2019    HDL 84 11/05/2019     Lab Results   Component Value Date    LDL 72 12/12/2019    LDL 48 11/05/2019     Lab Results   Component Value Date    TRIG 79 12/12/2019    TRIG 164 11/05/2019     DHEAS was 740 on 6/5/18, and has been <15 on 7/12/18, 8/20/18, 9/19/18, 10/23/18, 11/27/18, 1/9/19, 5/8/19, 6/18/19, 7/17/19, 11/15/19, 12/12/19.  Mitotane level (target 14-20): 1.8ng/dl on 8/20/18, 3.5 on 10/25/18, 6.2 on 12/4/18, 8.1 on 1/9/19 and 10.2 on 2/11/19, 14.2 on 6/18/19. Pending from 12/12/19.  Labs from [facility] 9/24/19 - WBC 4200, ANC 2600, Hb 10.4, Platelets 218K, lytes WNL, creat 0.61, Calcium 8, albumin 3.5, LFTs normal, mitotane level 18.8, cortisol 25.8, ACTH<5, aldosterone 4.9, free T4 1.17, TSH 0.07, DHEAS <15, renin plasma 10.6.     IMAGING STUDIES:  As above.    ASSESSMENT AND PLAN: A 34-year-old lady with right-sided functioning adrenocortical carcinoma, who is here for follow-up while on adjuvant mitotane.     Adrenocortical carcinoma:  - Started on adjuvant mitotane in July 2018 based on her presentation and tumor characteristics including invasion of the adrenal capsule, high mitotic rate, possibly positive margins, and high Ki67, which could result in a rate of recurrence as high as 40%.    - Restaging CT chest from today continues to show no evidence of disease recurrence and that new 2mm nodule is likely benign.   - Despite multiple supportive care  "interventions, patient has NOT been able to tolerate higher doses (above 2-3gm/day) of mitotane. Now doing better on 1000mg BID. Will continue.  - We've had numerous conversations regarding goals of therapy and that mitotane doesn't work on an \"all or none\" basis with no activity below therapeutic level. In her case, it is critical to not aim for a number but to actually aim for optimizing tolerance to therapy to allow continuation for another 4 years. Suzy and her  are in agreement.   - Monthly Mitotane level, CBC, comprehensive panel, TSH, FT4, cholesterol panel, 24-hour Urine Free Cortisol, and DHEAS.   - Plan to continue adjuvant Mitotane for up to 5 yrs if tolerated.  - Next restaging CT C/A/P in approx 2 months.    Concern for ativan use disorder:  - I counseled her of the addictive potential of Ativan and she did agree that she has felt increasingly desiring this medication.   - We have stopped Ativan and I would encourage other medical providers to not give any further refills.  - Discussed health coping strategies including cognitive behavioral interventions. Seen by our psychology team as well. Will setup f/up visit.    Insomnia:  - Trazodone is working well but she's taking 100mg at bedtime. Will give refills PRN.   - EKG with QTc 488 ms in April.    Endocrinopathies:  - Mgmt per endocrine team at the . Follow-up planned in the next couple months.    Multifocal macroovarian cysts:  - These can happen with mitotane and are likely not cancerous.   - Managed by Gyn.     Return to see me in 4 weeks with labs. Patient and family given opportunity to ask questions, which were answered to their satisfaction. They're in complete agreement as planned.    BILLIN.     Benson Cintron M.D.  Mary[employer]. Professor of Medicine  Genitourinary Oncology  Division of Hematology, Oncology & Transplantation  [facility]  "

## 2019-12-18 NOTE — TELEPHONE ENCOUNTER
RECORDS STATUS - ALL OTHER DIAGNOSIS      RECORDS RECEIVED FROM: Epic/CE   DATE RECEIVED: 12/20/2019    NOTES STATUS DETAILS   OFFICE NOTE from referring provider Complete  Dr. Benson Cintron MD   OFFICE NOTE from medical oncologist Complete EPIC   DISCHARGE SUMMARY from hospital Complete EPIC   DISCHARGE REPORT from the ER     OPERATIVE REPORT N/A    MEDICATION LIST Complete [facility]   CLINICAL TRIAL TREATMENTS TO DATE     LABS     PATHOLOGY REPORTS Complete [facility]   ANYTHING RELATED TO DIAGNOSIS Complete EPIC   GENONOMIC TESTING     TYPE:     IMAGING (NEED IMAGES & REPORT)     CT SCANS Complete PACS   MRI Complete PACS   MAMMO     Xray Chest  Complete PACS   ULTRASOUND Complete PACS   PET Complete PACS     No outside records.     Action    Action Taken 12/18/2019 2:41pm     I called pt Suzy to check on any outside records/materials. Per pt - no outside records to request.

## 2019-12-20 ENCOUNTER — PRE VISIT (OUTPATIENT)
Dept: ONCOLOGY | Facility: CLINIC | Age: 34
End: 2019-12-20

## 2019-12-21 RX ORDER — THIAMINE HCL 50 MG
50 TABLET ORAL DAILY
Qty: 90 TABLET | Refills: 1 | Status: SHIPPED | OUTPATIENT
Start: 2019-12-21 | End: 2020-12-15

## 2019-12-25 PROCEDURE — 82570 ASSAY OF URINE CREATININE: CPT | Performed by: INTERNAL MEDICINE

## 2019-12-25 PROCEDURE — 81050 URINALYSIS VOLUME MEASURE: CPT | Performed by: INTERNAL MEDICINE

## 2019-12-25 PROCEDURE — 99000 SPECIMEN HANDLING OFFICE-LAB: CPT | Performed by: INTERNAL MEDICINE

## 2019-12-25 PROCEDURE — 82530 CORTISOL FREE: CPT | Mod: 90 | Performed by: INTERNAL MEDICINE

## 2019-12-26 DIAGNOSIS — C74.01 ADRENAL CORTEX CANCER, RIGHT (H): ICD-10-CM

## 2019-12-26 LAB
COLLECT DURATION TIME UR: 24 H
CREAT 24H UR-MRATE: 1.13 G/(24.H) (ref 0.8–1.8)
CREAT UR-MCNC: 41 MG/DL
LAB SCANNED RESULT: NORMAL
SPECIMEN VOL UR: 2750 ML

## 2019-12-27 ENCOUNTER — RECORDS - HEALTHEAST (OUTPATIENT)
Dept: HEALTH INFORMATION MANAGEMENT | Facility: CLINIC | Age: 34
End: 2019-12-27

## 2019-12-28 LAB
COLLECT DURATION TIME SPEC: 24 H
CORTIS F 24H UR HPLC-MCNC: 77.6 UG/L
CORTIS F 24H UR-MRATE: 213.4 UG/D
CORTIS F/CREAT 24H UR: 194 UG/G CRT
CREAT 24H UR-MRATE: 1100 MG/D (ref 700–1600)
CREAT UR-MCNC: 40 MG/DL
IMP & REVIEW OF LAB RESULTS: ABNORMAL
SPECIMEN VOL ?TM UR: 2750 ML

## 2020-01-02 ENCOUNTER — DOCUMENTATION ONLY (OUTPATIENT)
Dept: ONCOLOGY | Facility: CLINIC | Age: 35
End: 2020-01-02

## 2020-01-02 DIAGNOSIS — E27.40 ADRENAL INSUFFICIENCY (H): ICD-10-CM

## 2020-01-02 NOTE — PROGRESS NOTES
Form Request Documentation    Date Received in Clinic:  12/24/19  Name/Type of Form: Disability Insurance Record Request with Medical Update  Date Completed: 1/2/2020  Copy Mailed to patient: Yes on 1/2/2020  Disposition of Form: Faxed to  Disability Benefits fax [phone] on 1/2/2020

## 2020-01-03 ENCOUNTER — COMMUNICATION - HEALTHEAST (OUTPATIENT)
Dept: FAMILY MEDICINE | Facility: CLINIC | Age: 35
End: 2020-01-03

## 2020-01-03 DIAGNOSIS — F41.9 ANXIETY: ICD-10-CM

## 2020-01-05 ENCOUNTER — COMMUNICATION - HEALTHEAST (OUTPATIENT)
Dept: FAMILY MEDICINE | Facility: CLINIC | Age: 35
End: 2020-01-05

## 2020-01-05 ENCOUNTER — MYC MEDICAL ADVICE (OUTPATIENT)
Dept: ENDOCRINOLOGY | Facility: CLINIC | Age: 35
End: 2020-01-05

## 2020-01-05 DIAGNOSIS — C74.01 ADRENAL CORTEX CANCER, RIGHT (H): Primary | ICD-10-CM

## 2020-01-05 DIAGNOSIS — F41.9 ANXIETY: ICD-10-CM

## 2020-01-05 RX ORDER — MEPERIDINE HYDROCHLORIDE 25 MG/ML
25 INJECTION INTRAMUSCULAR; INTRAVENOUS; SUBCUTANEOUS EVERY 30 MIN PRN
Status: CANCELLED | OUTPATIENT
Start: 2020-01-10

## 2020-01-05 RX ORDER — EPINEPHRINE 0.3 MG/.3ML
0.3 INJECTION SUBCUTANEOUS EVERY 5 MIN PRN
Status: CANCELLED | OUTPATIENT
Start: 2020-01-10

## 2020-01-05 RX ORDER — ALBUTEROL SULFATE 90 UG/1
1-2 AEROSOL, METERED RESPIRATORY (INHALATION)
Status: CANCELLED
Start: 2020-01-10

## 2020-01-05 RX ORDER — EPINEPHRINE 1 MG/ML
0.3 INJECTION, SOLUTION, CONCENTRATE INTRAVENOUS EVERY 5 MIN PRN
Status: CANCELLED | OUTPATIENT
Start: 2020-01-10

## 2020-01-05 RX ORDER — SODIUM CHLORIDE 9 MG/ML
1000 INJECTION, SOLUTION INTRAVENOUS CONTINUOUS PRN
Status: CANCELLED
Start: 2020-01-10

## 2020-01-05 RX ORDER — DIPHENHYDRAMINE HYDROCHLORIDE 50 MG/ML
50 INJECTION INTRAMUSCULAR; INTRAVENOUS
Status: CANCELLED
Start: 2020-01-10

## 2020-01-05 RX ORDER — ALBUTEROL SULFATE 0.83 MG/ML
2.5 SOLUTION RESPIRATORY (INHALATION)
Status: CANCELLED | OUTPATIENT
Start: 2020-01-10

## 2020-01-06 RX ORDER — HYDROCORTISONE 10 MG/1
TABLET ORAL
Qty: 400 TABLET | Refills: 3 | Status: SHIPPED | OUTPATIENT
Start: 2020-01-06 | End: 2020-07-07

## 2020-01-06 NOTE — TELEPHONE ENCOUNTER
hydrocortisone (CORTEF) 10 MG tablet      Last Written Prescription Date:  11/30/18  Last Fill Quantity: 180,   # refills: 11  Last Office Visit : 12/2/19  Future Office visit:  6/8/20    Routing refill request to provider for review/approval because:  Dose exceeds protocol parameters.

## 2020-01-07 ENCOUNTER — COMMUNICATION - HEALTHEAST (OUTPATIENT)
Dept: FAMILY MEDICINE | Facility: CLINIC | Age: 35
End: 2020-01-07

## 2020-01-07 DIAGNOSIS — C74.01 MALIGNANT NEOPLASM OF CORTEX OF RIGHT ADRENAL GLAND (H): Primary | ICD-10-CM

## 2020-01-13 ENCOUNTER — ONCOLOGY VISIT (OUTPATIENT)
Dept: ONCOLOGY | Facility: CLINIC | Age: 35
End: 2020-01-13
Attending: PSYCHOLOGIST
Payer: COMMERCIAL

## 2020-01-13 ENCOUNTER — OFFICE VISIT - HEALTHEAST (OUTPATIENT)
Dept: FAMILY MEDICINE | Facility: CLINIC | Age: 35
End: 2020-01-13

## 2020-01-13 DIAGNOSIS — F41.1 GENERALIZED ANXIETY DISORDER: ICD-10-CM

## 2020-01-13 DIAGNOSIS — E27.40 ADRENAL INSUFFICIENCY (H): ICD-10-CM

## 2020-01-13 DIAGNOSIS — F33.2 SEVERE EPISODE OF RECURRENT MAJOR DEPRESSIVE DISORDER, WITHOUT PSYCHOTIC FEATURES (H): ICD-10-CM

## 2020-01-13 DIAGNOSIS — C74.01 ADRENAL CORTICAL ADENOCARCINOMA OF RIGHT ADRENAL GLAND (H): ICD-10-CM

## 2020-01-13 PROCEDURE — G0463 HOSPITAL OUTPT CLINIC VISIT: HCPCS

## 2020-01-13 PROCEDURE — 90834 PSYTX W PT 45 MINUTES: CPT | Performed by: PSYCHOLOGIST

## 2020-01-13 ASSESSMENT — MIFFLIN-ST. JEOR: SCORE: 1346.78

## 2020-01-14 NOTE — PROGRESS NOTES
Confidential Summary of Oncology Psychology Followup Visit    Suzy Rodríguez is a 34 year old female who presents for Anxiety  The patient was seen for a 48 minute appointment on 1/14/2020.    Subjective: She looked very tired and reported extreme fatigue. She discussed a recent trip with her family and how that was a wonderful escape. Her purpose for this appointment was to discuss how to better assist her  with the stress of her illness and how to assist her brother with stressors in his household. We discussed interventions for caregivers and methods to improve her relationship with her brother and his wife to help reduce stress. These topics are important to help her cope with her illness because she feels extreme guilt related to her illness' impact on her  and a sense of responsibility to her brother and family.     Objective: She looked tired but her affect was congruent to the content of this appointment.     Diagnosis:   Encounter Diagnosis   Name Primary?     Generalized anxiety disorder      Recommendations/Plan:  1. Approach her  with a teamwork frame and discuss how they can better accomplish their goals with her limitations  2. Return for follow-up as needed.     Thank you for this opportunity to participate in your care of this patient.    Ector Marcelino Psy.D., L.P.  Director, Oncology Supportive Care

## 2020-01-16 NOTE — PROGRESS NOTES
Outpatient Physical Therapy Discharge Note     Patient: Suzy Rodríguez  : 1985    Beginning/End Dates of Reporting Period:  19 to 10/14/19 when last seen. Discharge written on 2020.  Total # of Rx sessions: 5    Referring Provider: Benson Cintron Diagnosis: Gen'l Mm weakness d/t Malignent neoplasm of R Adrenal Gland.      Client Self Report: Was off chemo x 3 weeks, and just restarted chemo. HA's about every 2 weeks. Anxious.     Objective Measurements:  Objective Measure: FACIT   Details: 10/14/19  Score 15;  INITIALLY:  Score 10.     Objective Measure: 6 minute walk test   Details: 10/14/19 - 1175 feet  INITIALLY:  948 feet     Objective Measure: Vitals  Details: Before Ex: HR 93, O2 98, /75  After Ex: , O2 95%, /65.     Objective Measure:  Strength   Details: 10/14/19  R 67, L 65.  INITIALLY:  R 60, L 60      Outcome Measures (most recent score):  FACIT Fatigue Subscale (score out of 52). The higher the score, the better the QOL.: 15  Six Minute Walk (meters). An increase of 70 or more meters indicates statistically significant change.: 1175    Goals:  Goal Identifier 1   Goal Description STG: Pt will be able to report NOLASCO's EOD or less. 10/14/19  MET    Target Date 19   Date Met  10/14/19   Progress:     Goal Identifier 2   Goal Description STG: Pt will be able to note improved ability to do laundry.  10/14/19 NOT MET    Target Date 19   Date Met      Progress:     Goal Identifier 3   Goal Description STG: Pt will be able to note less leg pain, 3/10 or less, and soreness the day after activities.    NOT MET    Target Date 19   Date Met      Progress:     Goal Identifier 4   Goal Description LTG: Pt will be able to be educated about the CRF program and be able to continue on her own.  10/14/19  MET   Target Date 10/07/19   Date Met  10/14/19   Progress:     Progress Toward Goals:   Progress this reporting period: Pt met 2/4 Goals.      Plan:  Discharge from therapy.    Discharge:    Reason for Discharge: Patient chooses to discontinue therapy.  Patient has failed to schedule further appointments.    Equipment Issued:      Discharge Plan: Patient to continue home program.  Pt to follow up w/MD as appropriate.   Liz Mei, PT, [facility] (#[id number])  [facility]           [phone]  Fax          [phone]  Appt #      [phone]

## 2020-01-20 ASSESSMENT — ANXIETY QUESTIONNAIRES
5. BEING SO RESTLESS THAT IT IS HARD TO SIT STILL: NOT AT ALL
GAD7 TOTAL SCORE: 9
3. WORRYING TOO MUCH ABOUT DIFFERENT THINGS: SEVERAL DAYS
4. TROUBLE RELAXING: MORE THAN HALF THE DAYS
7. FEELING AFRAID AS IF SOMETHING AWFUL MIGHT HAPPEN: MORE THAN HALF THE DAYS
1. FEELING NERVOUS, ANXIOUS, OR ON EDGE: NEARLY EVERY DAY
2. NOT BEING ABLE TO STOP OR CONTROL WORRYING: SEVERAL DAYS
6. BECOMING EASILY ANNOYED OR IRRITABLE: NOT AT ALL

## 2020-01-20 ASSESSMENT — PATIENT HEALTH QUESTIONNAIRE - PHQ9: SUM OF ALL RESPONSES TO PHQ QUESTIONS 1-9: 22

## 2020-01-22 ENCOUNTER — RECORDS - HEALTHEAST (OUTPATIENT)
Dept: ADMINISTRATIVE | Facility: OTHER | Age: 35
End: 2020-01-22

## 2020-01-22 ENCOUNTER — APPOINTMENT (OUTPATIENT)
Dept: LAB | Facility: CLINIC | Age: 35
End: 2020-01-22
Attending: INTERNAL MEDICINE
Payer: COMMERCIAL

## 2020-01-22 ENCOUNTER — ONCOLOGY VISIT (OUTPATIENT)
Dept: ONCOLOGY | Facility: CLINIC | Age: 35
End: 2020-01-22
Attending: INTERNAL MEDICINE
Payer: COMMERCIAL

## 2020-01-22 VITALS
RESPIRATION RATE: 16 BRPM | SYSTOLIC BLOOD PRESSURE: 117 MMHG | HEART RATE: 68 BPM | WEIGHT: 135 LBS | BODY MASS INDEX: 23.17 KG/M2 | DIASTOLIC BLOOD PRESSURE: 71 MMHG | TEMPERATURE: 98.7 F | OXYGEN SATURATION: 98 %

## 2020-01-22 DIAGNOSIS — C74.01 MALIGNANT NEOPLASM OF CORTEX OF RIGHT ADRENAL GLAND (H): ICD-10-CM

## 2020-01-22 DIAGNOSIS — C74.01 ADRENAL CORTEX CANCER, RIGHT (H): ICD-10-CM

## 2020-01-22 DIAGNOSIS — E27.40 ADRENAL INSUFFICIENCY (H): ICD-10-CM

## 2020-01-22 PROBLEM — A60.00 HERPES SIMPLEX INFECTION OF GENITOURINARY SYSTEM: Status: ACTIVE | Noted: 2018-11-30

## 2020-01-22 PROBLEM — N94.9 ADNEXAL CYST: Status: ACTIVE | Noted: 2019-09-24

## 2020-01-22 PROBLEM — F32.A DEPRESSION: Status: ACTIVE | Noted: 2019-09-24

## 2020-01-22 LAB
ALBUMIN SERPL-MCNC: 2.7 G/DL (ref 3.4–5)
ALP SERPL-CCNC: 90 U/L (ref 40–150)
ALT SERPL W P-5'-P-CCNC: 20 U/L (ref 0–50)
ANION GAP SERPL CALCULATED.3IONS-SCNC: 4 MMOL/L (ref 3–14)
AST SERPL W P-5'-P-CCNC: 14 U/L (ref 0–45)
BASOPHILS # BLD AUTO: 0 10E9/L (ref 0–0.2)
BASOPHILS NFR BLD AUTO: 0.5 %
BILIRUB SERPL-MCNC: 0.2 MG/DL (ref 0.2–1.3)
BUN SERPL-MCNC: 10 MG/DL (ref 7–30)
CALCIUM SERPL-MCNC: 7.8 MG/DL (ref 8.5–10.1)
CHLORIDE SERPL-SCNC: 106 MMOL/L (ref 94–109)
CHOLEST SERPL-MCNC: 164 MG/DL
CO2 SERPL-SCNC: 29 MMOL/L (ref 20–32)
CREAT SERPL-MCNC: 0.69 MG/DL (ref 0.52–1.04)
DIFFERENTIAL METHOD BLD: ABNORMAL
EOSINOPHIL # BLD AUTO: 0 10E9/L (ref 0–0.7)
EOSINOPHIL NFR BLD AUTO: 0.3 %
ERYTHROCYTE [DISTWIDTH] IN BLOOD BY AUTOMATED COUNT: 18.2 % (ref 10–15)
GFR SERPL CREATININE-BSD FRML MDRD: >90 ML/MIN/{1.73_M2}
GLUCOSE SERPL-MCNC: 108 MG/DL (ref 70–99)
HCT VFR BLD AUTO: 35.7 % (ref 35–47)
HDLC SERPL-MCNC: 98 MG/DL
HGB BLD-MCNC: 10.8 G/DL (ref 11.7–15.7)
IMM GRANULOCYTES # BLD: 0 10E9/L (ref 0–0.4)
IMM GRANULOCYTES NFR BLD: 0.3 %
LDLC SERPL CALC-MCNC: 48 MG/DL
LYMPHOCYTES # BLD AUTO: 0.7 10E9/L (ref 0.8–5.3)
LYMPHOCYTES NFR BLD AUTO: 19.2 %
MCH RBC QN AUTO: 24.5 PG (ref 26.5–33)
MCHC RBC AUTO-ENTMCNC: 30.3 G/DL (ref 31.5–36.5)
MCV RBC AUTO: 81 FL (ref 78–100)
MONOCYTES # BLD AUTO: 0.2 10E9/L (ref 0–1.3)
MONOCYTES NFR BLD AUTO: 4.3 %
NEUTROPHILS # BLD AUTO: 2.8 10E9/L (ref 1.6–8.3)
NEUTROPHILS NFR BLD AUTO: 75.4 %
NONHDLC SERPL-MCNC: 65 MG/DL
NRBC # BLD AUTO: 0 10*3/UL
NRBC BLD AUTO-RTO: 0 /100
PLATELET # BLD AUTO: 190 10E9/L (ref 150–450)
POTASSIUM SERPL-SCNC: 4.4 MMOL/L (ref 3.4–5.3)
PROT SERPL-MCNC: 5.5 G/DL (ref 6.8–8.8)
RBC # BLD AUTO: 4.4 10E12/L (ref 3.8–5.2)
SODIUM SERPL-SCNC: 139 MMOL/L (ref 133–144)
T4 FREE SERPL-MCNC: 0.87 NG/DL (ref 0.76–1.46)
TRIGL SERPL-MCNC: 84 MG/DL
TSH SERPL DL<=0.005 MIU/L-ACNC: 0.27 MU/L (ref 0.4–4)
WBC # BLD AUTO: 3.7 10E9/L (ref 4–11)

## 2020-01-22 PROCEDURE — 80061 LIPID PANEL: CPT | Performed by: INTERNAL MEDICINE

## 2020-01-22 PROCEDURE — 85025 COMPLETE CBC W/AUTO DIFF WBC: CPT | Performed by: INTERNAL MEDICINE

## 2020-01-22 PROCEDURE — 82627 DEHYDROEPIANDROSTERONE: CPT | Performed by: INTERNAL MEDICINE

## 2020-01-22 PROCEDURE — 84443 ASSAY THYROID STIM HORMONE: CPT | Performed by: INTERNAL MEDICINE

## 2020-01-22 PROCEDURE — 84439 ASSAY OF FREE THYROXINE: CPT | Performed by: INTERNAL MEDICINE

## 2020-01-22 PROCEDURE — G0463 HOSPITAL OUTPT CLINIC VISIT: HCPCS | Mod: ZF

## 2020-01-22 PROCEDURE — 36415 COLL VENOUS BLD VENIPUNCTURE: CPT

## 2020-01-22 PROCEDURE — 80053 COMPREHEN METABOLIC PANEL: CPT | Performed by: INTERNAL MEDICINE

## 2020-01-22 PROCEDURE — 99213 OFFICE O/P EST LOW 20 MIN: CPT | Mod: ZP | Performed by: INTERNAL MEDICINE

## 2020-01-22 ASSESSMENT — PAIN SCALES - GENERAL: PAINLEVEL: NO PAIN (0)

## 2020-01-22 NOTE — NURSING NOTE
Chief Complaint   Patient presents with     Oncology Clinic Visit     Return Adrenal Cortical Ca     Blood Draw     Labs drawn via  by RN in lab. VS taken.      Savanna Newell RN,

## 2020-01-22 NOTE — LETTER
1/22/2020       RE: Suzy Rodríguez  [address]  [facility] [address]     Dear Colleague,    Thank you for referring your patient, Suzy Rodríguez, to the [facility]. Please see a copy of my visit note below.    MEDICAL ONCOLOGY CLINIC NOTE    REFERRING PROVIDER: Warren Mansfield MD from [facility].     REASON FOR CURRENT VISIT: Follow-up while on mitotane as adjuvant therapy of adrenocortical carcinoma.      HISTORY OF PRESENT ILLNESS: Ms. Rodríguez is a 34-year-old lady with history notable for right-sided functioning adrenocortical carcinoma (diagnosed in May 2018), who is here for follow-up while on adjuvant mitotane. Her oncologic history is detailed as under.     Suzy is doing better since the last visit. She had developed significant, severe fatigue, slurred speech, muscle pain, insomnia, and tremors since being on the higher dose of mitotane 1500mg AM - 1500mg afternoon and 1000mg HS. These has improved quite a bit since dropping the dose to 1000mg BID and giving a brief treatment interruption. Now with only occasional slurred speech. Non-intentional low-amplitude tremors have significantly improved. Also on hydrocortisone 25mg QAM, 15mg early PM. No recurrence of episode of dizziness/LOC. No history of CVA/TIA/palpitations/CP/SOA etc. Using Compazine 10mg BID for chronic, mitotane-associated nausea. No diarrhea/abd pain/bloody BM. She has 1-2 lose BMs a day and uses Imodium PRN. Using trazodone 150mg every day for sleep sometimes. Has seen Christiano Marcelino PsyD.    Last visit with Dr. Hilton at [facility] was on 9/24/19 and mitotane level came back at 18.8. She also follows with Dr. Veena Jaquez in our endocrinology department. No clinical evidence of disease recurrence.     ONCOLOGIC HISTORY:   1. Right adrenocortical carcinoma, localized, high-risk (Ki67 20%; adrenal capsular invasion present, mitotic rate 15 per 50 HPF):  - Presented to  "emergency room on 5/8/2018 with acute onset left flank pain.   - CT abdomen and pelvis stone protocol without contrast [id number] showed \"9.2 x 8 cm heterogeneous right upper quadrant mass with small foci of calcification within it which is likely arising from the adrenal gland though inseparable from liver and upper pole of right kidney. It is incompletely evaluated on this noncontrast study. 3 x 2.6 cm mass right lobe of liver on image #85 also incompletely evaluated. 6 x 4 x 4 mm upper third left ureteral obstructing stone with mild to moderate secondary hydronephrosis. Collection of stones at lower pole left kidney extending over an area of approximately 6 x 7 x 4 mm. Additional nonobstructing 1 mm stone upper pole left kidney. 2 mm nonobstructing stone noted upper pole right kidney with no hydronephrosis on the right. Postop change consistent with gastric bypass. Noncontrast images of spleen, left adrenal gland, gallbladder, and pancreas unremarkable. No aneurysm. No adenopathy.\"  - Seen by Dr. Delvalle at [facility]. Referred to Dr. Alvino Patel and then Dr. Warren Mansfield.  - Endocrinology team directed workup showed high DHEAS level of 740 pre-surgery.   - Right open adrenalectomy and hepatic mobilization performed by Dr. Warren Mansfield on 6/25/2018. Pathology showed adrenocortical carcinoma measuring 11.3 cm, weighing 413 g with extension into or through the adrenal capsule negative lymphovascular invasion, tumor necrosis present, margins involved by tumor, no regional lymph nodes identified, pathologic stage T2 NX.  - DHEAS normalized post-surgery.   - Adjuvant Mitotane started on 7/24/18. Dose increased to 2000mg TID around 10/23/18 due to persistently low troughs. Held 1/16/19 for two weeks and resumed 1/30 at 1000 mg po BID due to very poor tolerance of higher doses. Highest mitotane level was 10.2 on 2/11/19. Was taking 1500 AM-1500 afternoon-1000mg PM June-Aug 2019, but unable to " "tolerate. Dose reduced to 1000mg TID as of 08/14/19. Dose interrupted on 9/25/19 by Dr. Hilton due to severe side effects. Plan to resume therapy at 1000mg BID in mid Oct.  - Saw radiation oncology at [facility], radiation oncology at [facility], as well as endocrine oncology (Dr. Hilton) at [facility].   - S/p adjuvant RT by Dr. Monsivais - 5040 cGy over 30 fr (8/24/18-10/6/18).  - 2/11/19: CT C/A/P and MRI brain with contrast - no evidence of disease recurrence.  - 05/07/19: CT C/A/P with contrast - \"1. No evidence for locally recurrent disease. 2. No evidence for metastatic disease in the chest, abdomen, or pelvis. 3. A 2.5 x 2.2 cm peripherally enhancing mass in hepatic segment 5, unchanged enhancing likely hemangioma given the peripheral discontinuous enhancement on previous examination. No other suspicious liver lesions.\"  - 08/14/19: CT C/A/P with contrast - AFUA.  - 11/5/19: CT C/A/P with contrast - \"1. New tiny (2mm) right upper lobe pulmonary nodule of questionable clinical significance. Recommend further attention to this at imaging follow-up.  2. No other new disease identified. 3. New large left ovarian cyst. Resolution of the prominent right ovarian cyst described on the prior exam.\"  - 12/12/19: CT Chest with contrast - stable 1mm lung nodule. No evidence of metastatic disease.    REVIEW OF SYSTEMS: 14 point ROS negative other than the symptoms noted above in the HPI.    PAST MEDICAL AND SURGICAL HISTORY: Reviewed today.  1. Right-sided adrenocortical carcinoma.  2. MHTFR mutation with h/o mutiple miscarriages.  3. Ovarian cysts diagnosed in 2011.  4. H/o gastric bypass in 2016 for obesity.    SOCIAL HISTORY:   Social History     Tobacco Use     Smoking status: Never Smoker     Smokeless tobacco: Never Used   Substance Use Topics     Alcohol use: Yes     Comment: occ.     Drug use: No     FAMILY HISTORY:   No clustering of malignancies.    ALLERGIES:   Allergies "   Allergen Reactions     Bee Venom Swelling     Ibuprofen Hives, Swelling and Other (See Comments)     CURRENT MEDICATIONS:   Current Outpatient Medications:      acetaminophen (TYLENOL) 325 MG tablet, Take 2 tablets (650 mg) by mouth every 4 hours as needed for other (multimodal surgical pain management along with NSAIDS and opioid medication as indicated based on pain control and physical function.), Disp: 150 tablet, Rfl: 0     buPROPion (WELLBUTRIN) 100 MG tablet, TAKE 1 TABLET (100 MG TOTAL) BY MOUTH 2 (TWO) TIMES A DAY., Disp: , Rfl: 3     calcium carbonate (OS-SHASHA) 500 MG tablet, Take 1 tablet (500 mg) by mouth 2 times daily, Disp: 180 tablet, Rfl: 3     cholecalciferol (VITAMIN D-1000 MAX ST) 1000 units TABS, Take 2,000 Units by mouth every morning , Disp: , Rfl:      diazepam (VALIUM) 5 MG tablet, Take 1 tablet (5 mg) by mouth once as needed for anxiety (MRI claustrophobia management), Disp: 2 tablet, Rfl: 3     ferrous fumarate 65 mg, [place]. FE,-Vitamin C 125 mg (VITRON-C)  MG TABS tablet, Take 1 tablet by mouth 3 times daily, Disp: 270 tablet, Rfl: 1     fludrocortisone (FLORINEF) 0.1 MG tablet, Take 1 tablet (0.1 mg) by mouth daily, Disp: 90 tablet, Rfl: 3     FLUoxetine (PROZAC) 40 MG capsule, Take 40 mg by mouth daily, Disp: , Rfl:      gabapentin (NEURONTIN) 300 MG capsule, Take 300 mg by mouth 3 times daily, Disp: , Rfl:      hydrocortisone (CORTEF) 10 MG tablet, Take 2.5 pills (=25 mg) in AM and 1.5 pills (=15 mg) at 2 pm daily, additional as directed., Disp: 400 tablet, Rfl: 3     levothyroxine (SYNTHROID/LEVOTHROID) 125 MCG tablet, Take 1 tablet (125 mcg) by mouth daily, Disp: 90 tablet, Rfl: 3     mitotane (LYSODREN) 500 MG tablet, Take 2 tablets (1,000 mg) by mouth 2 times daily, Disp: 120 tablet, Rfl: 0     Multiple Vitamins-Calcium (ONE-A-DAY WOMENS PO), Take 2 tablets by mouth every morning , Disp: , Rfl:      prochlorperazine (COMPAZINE) 5 MG tablet, Take 1 tablet (5 mg) by mouth every  8 hours as needed for nausea or vomiting, Disp: 90 tablet, Rfl: 3     traZODone (DESYREL) 50 MG tablet, Take 2 tablets (100 mg) by mouth nightly as needed for sleep, Disp: 60 tablet, Rfl: 0     UNABLE TO FIND, medical cannabis (Patient's own supply. Not a prescription), Disp: , Rfl:      vitamin B1 (THIAMINE) 50 MG tablet, Take 1 tablet (50 mg) by mouth daily, Disp: 90 tablet, Rfl: 1     EPINEPHrine (EPIPEN/ADRENACLICK/OR ANY BX GENERIC EQUIV) 0.3 MG/0.3ML injection 2-pack, As directed for bee stings, Disp: , Rfl:     PHYSICAL EXAMINATION:  Vital signs: /71 (BP Location: Right arm, Patient Position: Sitting, Cuff Size: Adult Regular)   Pulse 68   Temp 98.7  F (37.1  C) (Oral)   Resp 16   Wt 61.2 kg (135 lb)   LMP  (LMP Unknown)   SpO2 98%   Breastfeeding No   BMI 23.17 kg/m     ECOG performance status of 2.   GENERAL: Young lady, sitting in chair. Appears tremulous and fatigued, but in no acute distress.  HEENT: No icterus, no pallor. MMM, PERRL, OP clear.   NECK: Supple, no JVD/LAD.  LUNGS: CTAB, normal WOB on RA.  CARDIOVASCULAR: Regular rate and rhythm, no murmurs.   ABDOMEN: Well-healing posterior right sided abdominal incision without concerning findings. Soft, NT, ND, no masses appreciated, normal BS.   EXTREMITIES: No cyanosis, no clubbing, no edema.   NEUROLOGIC: Alert and fully oriented. Hands and feet are tremulous.   PSYCH: Affect flat, mood slightly anxious/depressed.    LABORATORY DATA:   Lab Test 01/22/20  1647 12/12/19  1715 12/02/19  1025 11/05/19  1349   WBC 3.7* 3.5*  --  3.7*   RBC 4.40 4.62  --  4.12   HGB 10.8* 10.7*  --  9.7*   HCT 35.7 36.7  --  33.2*   MCV 81 79  --  81   MCH 24.5* 23.2*  --  23.5*   MCHC 30.3* 29.2*  --  29.2*   RDW 18.2* 15.8*  --  15.5*    239  --  160   NEUTROPHIL 75.4 74.4  --  61.0    140 135 137   POTASSIUM 4.4 4.2 3.3* 4.0   CHLORIDE 106 108 103 105   CO2 29 29 28 27   ANIONGAP 4 4 4 6   * 108* 65* 89   BUN 10 11 8 10   CR 0.69 0.73  "0.70 0.80   SHASHA 7.8* 7.9* 7.8* 7.3*   PROTTOTAL 5.5* 6.1*  --  5.2*   ALBUMIN 2.7* 3.0*  --  2.4*   BILITOTAL 0.2 0.2  --  0.2   ALKPHOS 90 103  --  90   AST 14 14  --  14   ALT 20 21  --  18     Lab Test 01/22/20  1647 12/12/19  1715 11/05/19  1349 08/14/19  1050 07/17/19  1241   TSH 0.27* 0.09* 0.11* 0.04* 0.04*   T4 0.87 0.95 0.98 0.92 1.14     Lab Test 01/22/20  1647 12/12/19  1715   CHOL 164 193   HDL 98 106   LDL 48 72   TRIG 84 79     DHEAS was 740 on 6/5/18, and has been <15 on 7/12/18, 8/20/18, 9/19/18, 10/23/18, 11/27/18, 1/9/19, 5/8/19, 6/18/19, 7/17/19, 11/15/19, 12/12/19.  Mitotane level (target 14-20): 1.8ng/dl on 8/20/18, 3.5 on 10/25/18, 6.2 on 12/4/18, 8.1 on 1/9/19 and 10.2 on 2/11/19, 14.2 on 6/18/19. 10.8 on 12/12/19. Pending from this week.  [facility] - WBC 4200, ANC 2600, Hb 10.4, Platelets 218K, lytes WNL, creat 0.61, Calcium 8, albumin 3.5, LFTs normal, mitotane level 18.8, cortisol 25.8, ACTH<5, aldosterone 4.9, free T4 1.17, TSH 0.07, DHEAS <15, renin plasma 10.6.     IMAGING STUDIES:  As above.    ASSESSMENT AND PLAN: A 34-year-old lady with right-sided functioning adrenocortical carcinoma, who is here for follow-up while on adjuvant mitotane.     Adrenocortical carcinoma:  - Started on adjuvant mitotane in July 2018 based on her presentation and tumor characteristics including invasion of the adrenal capsule, high mitotic rate, possibly positive margins, and high Ki67, which could result in a rate of recurrence as high as 40%.    - Restaging CT chest in Dec showed no evidence of disease recurrence and that new 2mm nodule is likely benign.   - Despite multiple supportive care interventions, patient has NOT been able to tolerate higher doses (above 2-3gm/day) of mitotane. Now doing better on 1000mg BID. Will continue.  - We've had numerous conversations regarding goals of therapy and that mitotane doesn't work on an \"all or none\" basis with no activity below therapeutic level. " In her case, it is critical to not aim for a number but to actually aim for optimizing tolerance to therapy to allow continuation for another 4 years. Suzy and her  are in agreement.   - Monthly Mitotane level, CBC, comprehensive panel, TSH, FT4, cholesterol panel, 24-hour Urine Free Cortisol, and DHEAS.   - Plan to continue adjuvant Mitotane for up to 5 yrs if tolerated.  - Next restaging CT C/A/P in approx 5-6 weeks.     Concern for ativan use disorder:  - I counseled her of the addictive potential of Ativan and she did agree that she has felt increasingly desiring this medication.   - We have stopped Ativan and I would encourage other medical providers to not give any further refills.  - Discussed health coping strategies including cognitive behavioral interventions. Seen by our psychology team as well. Will setup f/up visit.    Insomnia:  - Trazodone is working well but she's taking 150mg at bedtime. Further mgmt per PCP.   - EKG with QTc 488 ms in 2019. Will repeat in next 1-3 months.    Endocrinopathies:  - Mgmt per endocrine team at the . Follow-up planned in the next couple months.    Return to see me in ~6 weeks with labs. Patient and family given opportunity to ask questions, which were answered to their satisfaction. They're in complete agreement as planned.    BILLIN.     Benson Cintron M.D.  . Professor of Medicine  Genitourinary Oncology  Division of Hematology, Oncology & Transplantation  [facility]

## 2020-01-22 NOTE — NURSING NOTE
"Oncology Rooming Note    January 22, 2020 5:00 PM   Suzy Rodríguez is a 34 year old female who presents for:    Chief Complaint   Patient presents with     Oncology Clinic Visit     Return Adrenal Cortical Ca     Blood Draw     Labs drawn via  by RN in lab. VS taken.      Initial Vitals: /71 (BP Location: Right arm, Patient Position: Sitting, Cuff Size: Adult Regular)   Pulse 68   Temp 98.7  F (37.1  C) (Oral)   Resp 16   Wt 61.2 kg (135 lb)   LMP  (LMP Unknown)   SpO2 98%   Breastfeeding No   BMI 23.17 kg/m   Estimated body mass index is 23.17 kg/m  as calculated from the following:    Height as of 12/17/19: 1.626 m (5' 4\").    Weight as of this encounter: 61.2 kg (135 lb). Body surface area is 1.66 meters squared.  No Pain (0) Comment: Data Unavailable   No LMP recorded (lmp unknown). (Menstrual status: IUD).  Allergies reviewed: Yes  Medications reviewed: Yes    Medications: Medication refills not needed today.  Pharmacy name entered into [facility]: [facility] - [id number]    Clinical concerns: lab results       Sulma Altamirano CMA              "

## 2020-01-22 NOTE — PROGRESS NOTES
"MEDICAL ONCOLOGY CLINIC NOTE    REFERRING PROVIDER: Warren Mansfield MD from [facility] clinic.     REASON FOR CURRENT VISIT: Follow-up while on mitotane as adjuvant therapy of adrenocortical carcinoma.      HISTORY OF PRESENT ILLNESS: Ms. Rodríguez is a 34-year-old lady with history notable for right-sided functioning adrenocortical carcinoma (diagnosed in May 2018), who is here for follow-up while on adjuvant mitotane. Her oncologic history is detailed as under.     Suzy is doing better since the last visit. She had developed significant, severe fatigue, slurred speech, muscle pain, insomnia, and tremors since being on the higher dose of mitotane 1500mg AM - 1500mg afternoon and 1000mg HS. These has improved quite a bit since dropping the dose to 1000mg BID and giving a brief treatment interruption. Now with only occasional slurred speech. Non-intentional low-amplitude tremors have significantly improved. Also on hydrocortisone 25mg QAM, 15mg early PM. No recurrence of episode of dizziness/LOC. No history of CVA/TIA/palpitations/CP/SOA etc. Using Compazine 10mg BID for chronic, mitotane-associated nausea. No diarrhea/abd pain/bloody BM. She has 1-2 lose BMs a day and uses Imodium PRN. Using trazodone 150mg every day for sleep sometimes. Has seen Christiano Marcelino PsyD.    Last visit with Dr. Hilton at [facility] was on 9/24/19 and mitotane level came back at 18.8. She also follows with Dr. Veena Jaquez in our endocrinology department. No clinical evidence of disease recurrence.     ONCOLOGIC HISTORY:   1. 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   - CT abdomen and pelvis stone protocol without contrast 5 2618 showed \"9.2 x 8 cm heterogeneous right upper quadrant mass with small foci of calcification within it which is " "likely arising from the adrenal gland though inseparable from liver and upper pole of right kidney. It is incompletely evaluated on this noncontrast study. 3 x 2.6 cm mass right lobe of liver on image #85 also incompletely evaluated. 6 x 4 x 4 mm upper third left ureteral obstructing stone with mild to moderate secondary hydronephrosis. Collection of stones at lower pole left kidney extending over an area of approximately 6 x 7 x 4 mm. Additional nonobstructing 1 mm stone upper pole left kidney. 2 mm nonobstructing stone noted upper pole right kidney with no hydronephrosis on the right. Postop change consistent with gastric bypass. Noncontrast images of spleen, left adrenal gland, gallbladder, and pancreas unremarkable. No aneurysm. No adenopathy.\"  - Seen by Dr. Delvalle at [facility] clinic. Referred to Dr. Alvino Patel and then Dr. Warren Mansfield.  - Endocrinology team directed workup showed high DHEAS level of 740 pre-surgery.   - Right open adrenalectomy and hepatic mobilization performed by Dr. Warren Mansfield on 6/25/2018. Pathology showed adrenocortical carcinoma measuring 11.3 cm, weighing 413 g with extension into or through the adrenal capsule negative lymphovascular invasion, tumor necrosis present, margins involved by tumor, no regional lymph nodes identified, pathologic stage T2 NX.  - DHEAS normalized post-surgery.   - Adjuvant Mitotane started on 7/24/18. Dose increased to 2000mg TID around 10/23/18 due to persistently low troughs. Held 1/16/19 for two weeks and resumed 1/30 at 1000 mg po BID due to very poor tolerance of higher doses. Highest mitotane level was 10.2 on 2/11/19. Was taking 1500 AM-1500 afternoon-1000mg PM June-Aug 2019, but unable to tolerate. Dose reduced to 1000mg TID as of 08/14/19. Dose interrupted on 9/25/19 by Dr. Hilton due to severe side effects. Plan to resume therapy at 1000mg BID in mid Oct.  - Saw radiation oncology at [facility], radiation " "oncology at [facility], as well as endocrine oncology (Dr. Hilton) at [facility].   - S/p adjuvant RT by Dr. Monsivais - 5040 cGy over 30 fr (8/24/18-10/6/18).  - 2/11/19: CT C/A/P and MRI brain with contrast - no evidence of disease recurrence.  - 05/07/19: CT C/A/P with contrast - \"1. No evidence for locally recurrent disease. 2. No evidence for metastatic disease in the chest, abdomen, or pelvis. 3. A 2.5 x 2.2 cm peripherally enhancing mass in hepatic segment 5, unchanged enhancing likely hemangioma given the peripheral discontinuous enhancement on previous examination. No other suspicious liver lesions.\"  - 08/14/19: CT C/A/P with contrast - AFUA.  - 11/5/19: CT C/A/P with contrast - \"1. New tiny (2mm) right upper lobe pulmonary nodule of questionable clinical significance. Recommend further attention to this at imaging follow-up.  2. No other new disease identified. 3. New large left ovarian cyst. Resolution of the prominent right ovarian cyst described on the prior exam.\"  - 12/12/19: CT Chest with contrast - stable 1mm lung nodule. No evidence of metastatic disease.    REVIEW OF SYSTEMS: 14 point ROS negative other than the symptoms noted above in the HPI.    PAST MEDICAL AND SURGICAL HISTORY: Reviewed today.  1. Right-sided adrenocortical carcinoma.  2. MHTFR mutation with h/o mutiple miscarriages.  3. Ovarian cysts diagnosed in 2011.  4. H/o gastric bypass in 2016 for obesity.    SOCIAL HISTORY:   Social History     Tobacco Use     Smoking status: Never Smoker     Smokeless tobacco: Never Used   Substance Use Topics     Alcohol use: Yes     Comment: occ.     Drug use: No     FAMILY HISTORY:   No clustering of malignancies.    ALLERGIES:   Allergies   Allergen Reactions     Bee Venom Swelling     Ibuprofen Hives, Swelling and Other (See Comments)     CURRENT MEDICATIONS:   Current Outpatient Medications:      acetaminophen (TYLENOL) 325 MG tablet, Take 2 tablets (650 mg) by mouth every 4 " hours as needed for other (multimodal surgical pain management along with NSAIDS and opioid medication as indicated based on pain control and physical function.), Disp: 150 tablet, Rfl: 0     buPROPion (WELLBUTRIN) 100 MG tablet, TAKE 1 TABLET (100 MG TOTAL) BY MOUTH 2 (TWO) TIMES A DAY., Disp: , Rfl: 3     calcium carbonate (OS-SHASHA) 500 MG tablet, Take 1 tablet (500 mg) by mouth 2 times daily, Disp: 180 tablet, Rfl: 3     cholecalciferol (VITAMIN D-1000 MAX ST) 1000 units TABS, Take 2,000 Units by mouth every morning , Disp: , Rfl:      diazepam (VALIUM) 5 MG tablet, Take 1 tablet (5 mg) by mouth once as needed for anxiety (MRI claustrophobia management), Disp: 2 tablet, Rfl: 3     ferrous fumarate 65 mg, [place]. FE,-Vitamin C 125 mg (VITRON-C)  MG TABS tablet, Take 1 tablet by mouth 3 times daily, Disp: 270 tablet, Rfl: 1     fludrocortisone (FLORINEF) 0.1 MG tablet, Take 1 tablet (0.1 mg) by mouth daily, Disp: 90 tablet, Rfl: 3     FLUoxetine (PROZAC) 40 MG capsule, Take 40 mg by mouth daily, Disp: , Rfl:      gabapentin (NEURONTIN) 300 MG capsule, Take 300 mg by mouth 3 times daily, Disp: , Rfl:      hydrocortisone (CORTEF) 10 MG tablet, Take 2.5 pills (=25 mg) in AM and 1.5 pills (=15 mg) at 2 pm daily, additional as directed., Disp: 400 tablet, Rfl: 3     levothyroxine (SYNTHROID/LEVOTHROID) 125 MCG tablet, Take 1 tablet (125 mcg) by mouth daily, Disp: 90 tablet, Rfl: 3     mitotane (LYSODREN) 500 MG tablet, Take 2 tablets (1,000 mg) by mouth 2 times daily, Disp: 120 tablet, Rfl: 0     Multiple Vitamins-Calcium (ONE-A-DAY WOMENS PO), Take 2 tablets by mouth every morning , Disp: , Rfl:      prochlorperazine (COMPAZINE) 5 MG tablet, Take 1 tablet (5 mg) by mouth every 8 hours as needed for nausea or vomiting, Disp: 90 tablet, Rfl: 3     traZODone (DESYREL) 50 MG tablet, Take 2 tablets (100 mg) by mouth nightly as needed for sleep, Disp: 60 tablet, Rfl: 0     UNABLE TO FIND, medical cannabis (Patient's own  supply. Not a prescription), Disp: , Rfl:      vitamin B1 (THIAMINE) 50 MG tablet, Take 1 tablet (50 mg) by mouth daily, Disp: 90 tablet, Rfl: 1     EPINEPHrine (EPIPEN/ADRENACLICK/OR ANY BX GENERIC EQUIV) 0.3 MG/0.3ML injection 2-pack, As directed for bee stings, Disp: , Rfl:     PHYSICAL EXAMINATION:  Vital signs: /71 (BP Location: Right arm, Patient Position: Sitting, Cuff Size: Adult Regular)   Pulse 68   Temp 98.7  F (37.1  C) (Oral)   Resp 16   Wt 61.2 kg (135 lb)   LMP  (LMP Unknown)   SpO2 98%   Breastfeeding No   BMI 23.17 kg/m    ECOG performance status of 2.   GENERAL: Young lady, sitting in chair. Appears tremulous and fatigued, but in no acute distress.  HEENT: No icterus, no pallor. MMM, PERRL, OP clear.   NECK: Supple, no JVD/LAD.  LUNGS: CTAB, normal WOB on RA.  CARDIOVASCULAR: Regular rate and rhythm, no murmurs.   ABDOMEN: Well-healing posterior right sided abdominal incision without concerning findings. Soft, NT, ND, no masses appreciated, normal BS.   EXTREMITIES: No cyanosis, no clubbing, no edema.   NEUROLOGIC: Alert and fully oriented. Hands and feet are tremulous.   PSYCH: Affect flat, mood slightly anxious/depressed.    LABORATORY DATA:   Lab Test 01/22/20  1647 12/12/19  1715 12/02/19  1025 11/05/19  1349   WBC 3.7* 3.5*  --  3.7*   RBC 4.40 4.62  --  4.12   HGB 10.8* 10.7*  --  9.7*   HCT 35.7 36.7  --  33.2*   MCV 81 79  --  81   MCH 24.5* 23.2*  --  23.5*   MCHC 30.3* 29.2*  --  29.2*   RDW 18.2* 15.8*  --  15.5*    239  --  160   NEUTROPHIL 75.4 74.4  --  61.0    140 135 137   POTASSIUM 4.4 4.2 3.3* 4.0   CHLORIDE 106 108 103 105   CO2 29 29 28 27   ANIONGAP 4 4 4 6   * 108* 65* 89   BUN 10 11 8 10   CR 0.69 0.73 0.70 0.80   SHASHA 7.8* 7.9* 7.8* 7.3*   PROTTOTAL 5.5* 6.1*  --  5.2*   ALBUMIN 2.7* 3.0*  --  2.4*   BILITOTAL 0.2 0.2  --  0.2   ALKPHOS 90 103  --  90   AST 14 14  --  14   ALT 20 21  --  18     Lab Test 01/22/20  1647 12/12/19  4751  "11/05/19  1349 08/14/19  1050 07/17/19  1241   TSH 0.27* 0.09* 0.11* 0.04* 0.04*   T4 0.87 0.95 0.98 0.92 1.14     Lab Test 01/22/20  1647 12/12/19  1715   CHOL 164 193   HDL 98 106   LDL 48 72   TRIG 84 79     DHEAS was 740 on 6/5/18, and has been <15 on 7/12/18, 8/20/18, 9/19/18, 10/23/18, 11/27/18, 1/9/19, 5/8/19, 6/18/19, 7/17/19, 11/15/19, 12/12/19.  Mitotane level (target 14-20): 1.8ng/dl on 8/20/18, 3.5 on 10/25/18, 6.2 on 12/4/18, 8.1 on 1/9/19 and 10.2 on 2/11/19, 14.2 on 6/18/19. 10.8 on 12/12/19. Pending from this week.  Labs from [facility] 9/24/19 - WBC 4200, ANC 2600, Hb 10.4, Platelets 218K, lytes WNL, creat 0.61, Calcium 8, albumin 3.5, LFTs normal, mitotane level 18.8, cortisol 25.8, ACTH<5, aldosterone 4.9, free T4 1.17, TSH 0.07, DHEAS <15, renin plasma 10.6.     IMAGING STUDIES:  As above.    ASSESSMENT AND PLAN: A 34-year-old lady with right-sided functioning adrenocortical carcinoma, who is here for follow-up while on adjuvant mitotane.     Adrenocortical carcinoma:  - Started on adjuvant mitotane in July 2018 based on her presentation and tumor characteristics including invasion of the adrenal capsule, high mitotic rate, possibly positive margins, and high Ki67, which could result in a rate of recurrence as high as 40%.    - Restaging CT chest in Dec showed no evidence of disease recurrence and that new 2mm nodule is likely benign.   - Despite multiple supportive care interventions, patient has NOT been able to tolerate higher doses (above 2-3gm/day) of mitotane. Now doing better on 1000mg BID. Will continue.  - We've had numerous conversations regarding goals of therapy and that mitotane doesn't work on an \"all or none\" basis with no activity below therapeutic level. In her case, it is critical to not aim for a number but to actually aim for optimizing tolerance to therapy to allow continuation for another 4 years. Suzy and her  are in agreement.   - Monthly Mitotane level, CBC, " comprehensive panel, TSH, FT4, cholesterol panel, 24-hour Urine Free Cortisol, and DHEAS.   - Plan to continue adjuvant Mitotane for up to 5 yrs if tolerated.  - Next restaging CT C/A/P in approx 5-6 weeks.     Concern for ativan use disorder:  - I counseled her of the addictive potential of Ativan and she did agree that she has felt increasingly desiring this medication.   - We have stopped Ativan and I would encourage other medical providers to not give any further refills.  - Discussed health coping strategies including cognitive behavioral interventions. Seen by our psychology team as well. Will setup f/up visit.    Insomnia:  - Trazodone is working well but she's taking 150mg at bedtime. Further mgmt per PCP.   - EKG with QTc 488 ms in 2019. Will repeat in next 1-3 months.    Endocrinopathies:  - Mgmt per endocrine team at the . Follow-up planned in the next couple months.    Return to see me in ~6 weeks with labs. Patient and family given opportunity to ask questions, which were answered to their satisfaction. They're in complete agreement as planned.    BILLIN.     Benson Cintron M.D.  . Professor of Medicine  Genitourinary Oncology  Division of Hematology, Oncology & Transplantation  [facility]

## 2020-01-23 ENCOUNTER — COMMUNICATION - HEALTHEAST (OUTPATIENT)
Dept: FAMILY MEDICINE | Facility: CLINIC | Age: 35
End: 2020-01-23

## 2020-01-23 LAB — DHEA-S SERPL-MCNC: <15 UG/DL (ref 35–430)

## 2020-01-23 PROCEDURE — 81050 URINALYSIS VOLUME MEASURE: CPT | Performed by: INTERNAL MEDICINE

## 2020-01-23 PROCEDURE — 99000 SPECIMEN HANDLING OFFICE-LAB: CPT | Performed by: INTERNAL MEDICINE

## 2020-01-23 PROCEDURE — 82530 CORTISOL FREE: CPT | Mod: 90 | Performed by: INTERNAL MEDICINE

## 2020-01-23 PROCEDURE — 82570 ASSAY OF URINE CREATININE: CPT | Performed by: INTERNAL MEDICINE

## 2020-01-24 DIAGNOSIS — C74.01 MALIGNANT NEOPLASM OF CORTEX OF RIGHT ADRENAL GLAND (H): ICD-10-CM

## 2020-01-24 DIAGNOSIS — C74.01 ADRENAL CORTEX CANCER, RIGHT (H): ICD-10-CM

## 2020-01-24 LAB
COLLECT DURATION TIME UR: 24 H
CORTIS SERPL-MCNC: 4.6 UG/DL (ref 4–22)
CREAT 24H UR-MRATE: 0.89 G/(24.H) (ref 0.8–1.8)
CREAT UR-MCNC: 42 MG/DL
MISCELLANEOUS TEST: NORMAL
SPECIMEN VOL UR: 2125 ML

## 2020-01-24 PROCEDURE — 36415 COLL VENOUS BLD VENIPUNCTURE: CPT | Performed by: INTERNAL MEDICINE

## 2020-01-24 PROCEDURE — 82533 TOTAL CORTISOL: CPT | Performed by: INTERNAL MEDICINE

## 2020-01-24 PROCEDURE — 80375 DRUG/SUBSTANCE NOS 1-3: CPT | Mod: 90 | Performed by: INTERNAL MEDICINE

## 2020-01-24 PROCEDURE — 99000 SPECIMEN HANDLING OFFICE-LAB: CPT | Performed by: INTERNAL MEDICINE

## 2020-01-28 ENCOUNTER — TELEPHONE (OUTPATIENT)
Dept: SPIRITUAL SERVICES | Facility: CLINIC | Age: 35
End: 2020-01-28

## 2020-01-28 NOTE — TELEPHONE ENCOUNTER
"SPIRITUAL HEALTH SERVICES  Telephone Encounter     Reason: Referred to contact Suzy through her response to the oncology distress screen.\" Do you struggle with the loss of meaning and anuradha in your life? : A GREAT DEAL\"    Intervention: Reviewed documentation. I contacted Suzy and left a voicemail.    Plan: I have no plan for follow-up.    Kevin Zhu, MPH, MDiv, [facility]  Staff       "

## 2020-01-30 LAB
COLLECT DURATION TIME SPEC: 24 H
CORTIS F 24H UR HPLC-MCNC: 56 UG/L
CORTIS F 24H UR-MRATE: 119 UG/D
CORTIS F/CREAT 24H UR: 124.44 UG/G CRT
CREAT 24H UR-MRATE: 956 MG/D (ref 700–1600)
CREAT UR-MCNC: 45 MG/DL
IMP & REVIEW OF LAB RESULTS: ABNORMAL
SPECIMEN VOL ?TM UR: 2125 ML

## 2020-01-31 ENCOUNTER — COMMUNICATION - HEALTHEAST (OUTPATIENT)
Dept: FAMILY MEDICINE | Facility: CLINIC | Age: 35
End: 2020-01-31

## 2020-01-31 DIAGNOSIS — F41.9 ANXIETY: ICD-10-CM

## 2020-02-03 RX ORDER — EPINEPHRINE 0.3 MG/.3ML
0.3 INJECTION SUBCUTANEOUS EVERY 5 MIN PRN
Status: CANCELLED | OUTPATIENT
Start: 2020-02-09

## 2020-02-03 RX ORDER — EPINEPHRINE 1 MG/ML
0.3 INJECTION, SOLUTION, CONCENTRATE INTRAVENOUS EVERY 5 MIN PRN
Status: CANCELLED | OUTPATIENT
Start: 2020-02-09

## 2020-02-03 RX ORDER — ALBUTEROL SULFATE 90 UG/1
1-2 AEROSOL, METERED RESPIRATORY (INHALATION)
Status: CANCELLED
Start: 2020-02-09

## 2020-02-03 RX ORDER — MEPERIDINE HYDROCHLORIDE 25 MG/ML
25 INJECTION INTRAMUSCULAR; INTRAVENOUS; SUBCUTANEOUS EVERY 30 MIN PRN
Status: CANCELLED | OUTPATIENT
Start: 2020-02-09

## 2020-02-03 RX ORDER — DIPHENHYDRAMINE HYDROCHLORIDE 50 MG/ML
50 INJECTION INTRAMUSCULAR; INTRAVENOUS
Status: CANCELLED
Start: 2020-02-09

## 2020-02-03 RX ORDER — SODIUM CHLORIDE 9 MG/ML
1000 INJECTION, SOLUTION INTRAVENOUS CONTINUOUS PRN
Status: CANCELLED
Start: 2020-02-09

## 2020-02-03 RX ORDER — ALBUTEROL SULFATE 0.83 MG/ML
2.5 SOLUTION RESPIRATORY (INHALATION)
Status: CANCELLED | OUTPATIENT
Start: 2020-02-09

## 2020-02-06 LAB — LAB SCANNED RESULT: NORMAL

## 2020-02-07 ENCOUNTER — COMMUNICATION - HEALTHEAST (OUTPATIENT)
Dept: FAMILY MEDICINE | Facility: CLINIC | Age: 35
End: 2020-02-07

## 2020-02-07 DIAGNOSIS — F41.1 GENERALIZED ANXIETY DISORDER: ICD-10-CM

## 2020-02-07 DIAGNOSIS — C74.01 MALIGNANT NEOPLASM OF CORTEX OF RIGHT ADRENAL GLAND (H): Primary | ICD-10-CM

## 2020-02-07 RX ORDER — ONDANSETRON 8 MG/1
8 TABLET, FILM COATED ORAL EVERY 8 HOURS PRN
Qty: 30 TABLET | Refills: 0 | Status: SHIPPED | OUTPATIENT
Start: 2020-02-07 | End: 2021-06-01

## 2020-02-10 ENCOUNTER — COMMUNICATION - HEALTHEAST (OUTPATIENT)
Dept: FAMILY MEDICINE | Facility: CLINIC | Age: 35
End: 2020-02-10

## 2020-02-21 DIAGNOSIS — E03.8 SECONDARY HYPOTHYROIDISM: ICD-10-CM

## 2020-02-23 ENCOUNTER — HEALTH MAINTENANCE LETTER (OUTPATIENT)
Age: 35
End: 2020-02-23

## 2020-02-24 ENCOUNTER — ONCOLOGY VISIT (OUTPATIENT)
Dept: ONCOLOGY | Facility: CLINIC | Age: 35
End: 2020-02-24
Attending: PSYCHOLOGIST
Payer: COMMERCIAL

## 2020-02-24 DIAGNOSIS — F41.1 GENERALIZED ANXIETY DISORDER: Primary | ICD-10-CM

## 2020-02-24 PROCEDURE — 90834 PSYTX W PT 45 MINUTES: CPT | Performed by: PSYCHOLOGIST

## 2020-02-24 RX ORDER — LEVOTHYROXINE SODIUM 125 UG/1
125 TABLET ORAL DAILY
Qty: 90 TABLET | Refills: 3 | Status: SHIPPED | OUTPATIENT
Start: 2020-02-24 | End: 2021-02-23

## 2020-02-24 NOTE — TELEPHONE ENCOUNTER
LEVOTHYROXINE SODIUM 125MCG TABS   Last Written Prescription Date:  2/14/2019  Last Fill Quantity: 90,   # refills: 3  Last Office Visit : 12/2/2019  Future Office visit:  6/8/2020    Routing refill request to provider for review/approval because:  Abnormal TSH       Continue same dose??   Change dose??  Refer to Provider for review and refills.    TSH 0.27  Low   0.40 - 4.00 mU/L Final 01/22/2020  4:47 PM 1740     Veena Alvarez RN  Central Triage Red Flags/Med Refills

## 2020-02-24 NOTE — PROGRESS NOTES
"Confidential Summary of Oncology Psychology Followup Visit    Suzy Rodríguez is a 34 year old female who presents for anxiety. The patient was seen for a 46 minute appointment on 2/24/2020.    Subjective: She continues to struggle under the weight of significant loss created by her illness. She feels guilt regarding lack of energy, loss of income, inability to contribute much at home, and \"being a burden.\" She is also concerned about weight gain which is likely due to all of her treatments. She cried during most of the appointment and was encouraged to return more often.     Objective: She cried during most of the appointment. Her affect was congruent with the content of the therapeutic content.     Diagnosis:   Encounter Diagnosis   Name Primary?     Generalized anxiety disorder Yes     Recommendations/Plan:  1. Monitor caloric intake to ensure she is getting the necessary amount of calories and nutrients  2. Continue to engage her problem-solving skills to improve her daily activity levels  3. Return for supportive therapeutic techniques.     Thank you for this opportunity to participate in your care of this patient.    Ector Marcelino Psy.D., L.P.  Director, Oncology Supportive Care   "

## 2020-02-25 ENCOUNTER — ANCILLARY PROCEDURE (OUTPATIENT)
Dept: CT IMAGING | Facility: CLINIC | Age: 35
End: 2020-02-25
Attending: INTERNAL MEDICINE
Payer: COMMERCIAL

## 2020-02-25 DIAGNOSIS — C74.01 MALIGNANT NEOPLASM OF CORTEX OF RIGHT ADRENAL GLAND (H): ICD-10-CM

## 2020-02-25 DIAGNOSIS — C74.01 ADRENAL CORTEX CANCER, RIGHT (H): ICD-10-CM

## 2020-02-25 RX ORDER — IOPAMIDOL 755 MG/ML
82 INJECTION, SOLUTION INTRAVASCULAR ONCE
Status: COMPLETED | OUTPATIENT
Start: 2020-02-25 | End: 2020-02-25

## 2020-02-25 RX ADMIN — IOPAMIDOL 82 ML: 755 INJECTION, SOLUTION INTRAVASCULAR at 11:00

## 2020-02-26 ENCOUNTER — ONCOLOGY VISIT (OUTPATIENT)
Dept: ONCOLOGY | Facility: CLINIC | Age: 35
End: 2020-02-26
Attending: INTERNAL MEDICINE
Payer: COMMERCIAL

## 2020-02-26 ENCOUNTER — APPOINTMENT (OUTPATIENT)
Dept: LAB | Facility: CLINIC | Age: 35
End: 2020-02-26
Attending: INTERNAL MEDICINE
Payer: COMMERCIAL

## 2020-02-26 ENCOUNTER — RECORDS - HEALTHEAST (OUTPATIENT)
Dept: ADMINISTRATIVE | Facility: OTHER | Age: 35
End: 2020-02-26

## 2020-02-26 VITALS
DIASTOLIC BLOOD PRESSURE: 73 MMHG | WEIGHT: 154.4 LBS | HEART RATE: 74 BPM | RESPIRATION RATE: 16 BRPM | OXYGEN SATURATION: 98 % | BODY MASS INDEX: 26.36 KG/M2 | SYSTOLIC BLOOD PRESSURE: 105 MMHG | TEMPERATURE: 97.7 F | HEIGHT: 64 IN

## 2020-02-26 DIAGNOSIS — C74.01 MALIGNANT NEOPLASM OF CORTEX OF RIGHT ADRENAL GLAND (H): ICD-10-CM

## 2020-02-26 LAB
ALBUMIN SERPL-MCNC: 2.8 G/DL (ref 3.4–5)
ALP SERPL-CCNC: 89 U/L (ref 40–150)
ALT SERPL W P-5'-P-CCNC: 19 U/L (ref 0–50)
ANION GAP SERPL CALCULATED.3IONS-SCNC: 6 MMOL/L (ref 3–14)
AST SERPL W P-5'-P-CCNC: 18 U/L (ref 0–45)
BASOPHILS # BLD AUTO: 0 10E9/L (ref 0–0.2)
BASOPHILS NFR BLD AUTO: 0.4 %
BILIRUB SERPL-MCNC: 0.2 MG/DL (ref 0.2–1.3)
BUN SERPL-MCNC: 12 MG/DL (ref 7–30)
CALCIUM SERPL-MCNC: 7.8 MG/DL (ref 8.5–10.1)
CHLORIDE SERPL-SCNC: 104 MMOL/L (ref 94–109)
CHOLEST SERPL-MCNC: 157 MG/DL
CO2 SERPL-SCNC: 27 MMOL/L (ref 20–32)
CREAT SERPL-MCNC: 0.59 MG/DL (ref 0.52–1.04)
DIFFERENTIAL METHOD BLD: ABNORMAL
EOSINOPHIL # BLD AUTO: 0.1 10E9/L (ref 0–0.7)
EOSINOPHIL NFR BLD AUTO: 0.9 %
ERYTHROCYTE [DISTWIDTH] IN BLOOD BY AUTOMATED COUNT: 17.6 % (ref 10–15)
GFR SERPL CREATININE-BSD FRML MDRD: >90 ML/MIN/{1.73_M2}
GLUCOSE SERPL-MCNC: 83 MG/DL (ref 70–99)
HCT VFR BLD AUTO: 37.4 % (ref 35–47)
HDLC SERPL-MCNC: 112 MG/DL
HGB BLD-MCNC: 11.4 G/DL (ref 11.7–15.7)
IMM GRANULOCYTES # BLD: 0 10E9/L (ref 0–0.4)
IMM GRANULOCYTES NFR BLD: 0.3 %
LDLC SERPL CALC-MCNC: 24 MG/DL
LYMPHOCYTES # BLD AUTO: 1.3 10E9/L (ref 0.8–5.3)
LYMPHOCYTES NFR BLD AUTO: 16.2 %
MCH RBC QN AUTO: 25.6 PG (ref 26.5–33)
MCHC RBC AUTO-ENTMCNC: 30.5 G/DL (ref 31.5–36.5)
MCV RBC AUTO: 84 FL (ref 78–100)
MISCELLANEOUS TEST: NORMAL
MONOCYTES # BLD AUTO: 0.6 10E9/L (ref 0–1.3)
MONOCYTES NFR BLD AUTO: 7.3 %
NEUTROPHILS # BLD AUTO: 5.9 10E9/L (ref 1.6–8.3)
NEUTROPHILS NFR BLD AUTO: 74.9 %
NONHDLC SERPL-MCNC: 45 MG/DL
NRBC # BLD AUTO: 0 10*3/UL
NRBC BLD AUTO-RTO: 0 /100
PLATELET # BLD AUTO: 205 10E9/L (ref 150–450)
POTASSIUM SERPL-SCNC: 3.4 MMOL/L (ref 3.4–5.3)
PROT SERPL-MCNC: 5.7 G/DL (ref 6.8–8.8)
RBC # BLD AUTO: 4.45 10E12/L (ref 3.8–5.2)
SODIUM SERPL-SCNC: 137 MMOL/L (ref 133–144)
T4 FREE SERPL-MCNC: 1.08 NG/DL (ref 0.76–1.46)
TRIGL SERPL-MCNC: 108 MG/DL
TSH SERPL DL<=0.005 MIU/L-ACNC: 0.48 MU/L (ref 0.4–4)
WBC # BLD AUTO: 7.8 10E9/L (ref 4–11)

## 2020-02-26 PROCEDURE — 85025 COMPLETE CBC W/AUTO DIFF WBC: CPT | Performed by: INTERNAL MEDICINE

## 2020-02-26 PROCEDURE — 36415 COLL VENOUS BLD VENIPUNCTURE: CPT

## 2020-02-26 PROCEDURE — 80061 LIPID PANEL: CPT | Performed by: INTERNAL MEDICINE

## 2020-02-26 PROCEDURE — 84999 UNLISTED CHEMISTRY PROCEDURE: CPT | Performed by: INTERNAL MEDICINE

## 2020-02-26 PROCEDURE — 80053 COMPREHEN METABOLIC PANEL: CPT | Performed by: INTERNAL MEDICINE

## 2020-02-26 PROCEDURE — 99215 OFFICE O/P EST HI 40 MIN: CPT | Mod: ZP | Performed by: INTERNAL MEDICINE

## 2020-02-26 PROCEDURE — 84443 ASSAY THYROID STIM HORMONE: CPT | Performed by: INTERNAL MEDICINE

## 2020-02-26 PROCEDURE — G0463 HOSPITAL OUTPT CLINIC VISIT: HCPCS | Mod: ZF

## 2020-02-26 PROCEDURE — 84439 ASSAY OF FREE THYROXINE: CPT | Performed by: INTERNAL MEDICINE

## 2020-02-26 PROCEDURE — 80375 DRUG/SUBSTANCE NOS 1-3: CPT | Performed by: INTERNAL MEDICINE

## 2020-02-26 PROCEDURE — 82627 DEHYDROEPIANDROSTERONE: CPT | Performed by: INTERNAL MEDICINE

## 2020-02-26 ASSESSMENT — MIFFLIN-ST. JEOR: SCORE: 1380.6

## 2020-02-26 ASSESSMENT — PAIN SCALES - GENERAL: PAINLEVEL: MODERATE PAIN (5)

## 2020-02-26 NOTE — PROGRESS NOTES
"MEDICAL ONCOLOGY CLINIC NOTE    REFERRING PROVIDER: Warren Mansfield MD from [facility] Urologic Oncology clinic.     REASON FOR CURRENT VISIT: Follow-up while on mitotane as adjuvant therapy of adrenocortical carcinoma.      HISTORY OF PRESENT ILLNESS: Ms. Rodríguez is a 34-year-old lady with history notable for right-sided functioning adrenocortical carcinoma (diagnosed in May 2018), who is here for follow-up while on adjuvant mitotane. Her oncologic history is detailed as under.     Suzy has been stable since the last visit. She had developed significant, severe fatigue, slurred speech, muscle pain, insomnia, and tremors since being on the higher dose of mitotane 1500mg AM - 1500mg afternoon and 1000mg HS. These has improved quite a bit since dropping the dose to 1000mg BID and giving a brief treatment interruption. Now with only occasional slurred speech. Non-intentional low-amplitude tremors have significantly improved. Also on hydrocortisone 25mg QAM, 15mg early PM. No recurrence of episode of dizziness/LOC. No history of CVA/TIA/palpitations/CP/SOA etc. Using Compazine 10mg BID for chronic, mitotane-associated nausea. No diarrhea/abd pain/bloody BM. She has 1-2 lose BMs a day and uses Imodium PRN. Using trazodone 150mg every day for sleep sometimes. Has seen Christiano Marcelino PsyD.    Last visit with Dr. Hilton at [facility] was on 9/24/19 and mitotane level came back at 18.8. She also follows with Dr. Veena Jaquez in our endocrinology department. No clinical evidence of disease recurrence.     ONCOLOGIC HISTORY:   1. 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   - CT abdomen and pelvis stone protocol without contrast 5 8143 showed \"9.2 x 8 cm heterogeneous right upper quadrant mass with small foci of calcification within it which is " "likely arising from the adrenal gland though inseparable from liver and upper pole of right kidney. It is incompletely evaluated on this noncontrast study. 3 x 2.6 cm mass right lobe of liver on image #85 also incompletely evaluated. 6 x 4 x 4 mm upper third left ureteral obstructing stone with mild to moderate secondary hydronephrosis. Collection of stones at lower pole left kidney extending over an area of approximately 6 x 7 x 4 mm. Additional nonobstructing 1 mm stone upper pole left kidney. 2 mm nonobstructing stone noted upper pole right kidney with no hydronephrosis on the right. Postop change consistent with gastric bypass. Noncontrast images of spleen, left adrenal gland, gallbladder, and pancreas unremarkable. No aneurysm. No adenopathy.\"  - Seen by Dr. Delvalle at [facility] clinic. Referred to Dr. Alvino Patel and then Dr. Warren Mansfield.  - Endocrinology team directed workup showed high DHEAS level of 740 pre-surgery.   - Right open adrenalectomy and hepatic mobilization performed by Dr. Warren Mansfield on 6/25/2018. Pathology showed adrenocortical carcinoma measuring 11.3 cm, weighing 413 g with extension into or through the adrenal capsule negative lymphovascular invasion, tumor necrosis present, margins involved by tumor, no regional lymph nodes identified, pathologic stage T2 NX.  - DHEAS normalized post-surgery.   - Adjuvant Mitotane started on 7/24/18. Dose increased to 2000mg TID around 10/23/18 due to persistently low troughs. Held 1/16/19 for two weeks and resumed 1/30 at 1000 mg po BID due to very poor tolerance of higher doses. Highest mitotane level was 10.2 on 2/11/19. Was taking 1500 AM-1500 afternoon-1000mg PM June-Aug 2019, but unable to tolerate. Dose reduced to 1000mg TID as of 08/14/19. Dose interrupted on 9/25/19 by Dr. Hilton due to severe side effects. Plan to resume therapy at 1000mg BID in mid Oct.  - Saw radiation oncology at [facility], radiation " "oncology at [facility], as well as endocrine oncology (Dr. Hilton) at [facility].   - S/p adjuvant RT by Dr. Monsivais - 5040 cGy over 30 fr (8/24/18-10/6/18).  - 2/11/19: CT C/A/P and MRI brain with contrast - no evidence of disease recurrence.  - 05/07/19: CT C/A/P with contrast - \"1. No evidence for locally recurrent disease. 2. No evidence for metastatic disease in the chest, abdomen, or pelvis. 3. A 2.5 x 2.2 cm peripherally enhancing mass in hepatic segment 5, unchanged enhancing likely hemangioma given the peripheral discontinuous enhancement on previous examination. No other suspicious liver lesions.\"  - 08/14/19: CT C/A/P with contrast - AFUA.  - 11/5/19: CT C/A/P with contrast - \"1. New tiny (2mm) right upper lobe pulmonary nodule of questionable clinical significance. Recommend further attention to this at imaging follow-up.  2. No other new disease identified. 3. New large left ovarian cyst. Resolution of the prominent right ovarian cyst described on the prior exam.\"  - 12/12/19: CT Chest with contrast - stable 1mm lung nodule. No evidence of metastatic disease.  - 02/25/20: CT C/A/P with contrast - AFUA.     REVIEW OF SYSTEMS: 14 point ROS negative other than the symptoms noted above in the HPI.    PAST MEDICAL AND SURGICAL HISTORY: Reviewed today.  1. Right-sided adrenocortical carcinoma.  2. MHTFR mutation with h/o mutiple miscarriages.  3. Ovarian cysts diagnosed in 2011.  4. H/o gastric bypass in 2016 for obesity.    SOCIAL HISTORY:   Social History     Tobacco Use     Smoking status: Never Smoker     Smokeless tobacco: Never Used   Substance Use Topics     Alcohol use: Yes     Comment: occ.     Drug use: No     FAMILY HISTORY:   No clustering of malignancies.    ALLERGIES:   Allergies   Allergen Reactions     Bee Venom Swelling     Ibuprofen Hives, Swelling and Other (See Comments)     CURRENT MEDICATIONS:   Current Outpatient Medications:      acetaminophen (TYLENOL) 325 MG " tablet, Take 2 tablets (650 mg) by mouth every 4 hours as needed for other (multimodal surgical pain management along with NSAIDS and opioid medication as indicated based on pain control and physical function.), Disp: 150 tablet, Rfl: 0     buPROPion (WELLBUTRIN) 100 MG tablet, TAKE 1 TABLET (100 MG TOTAL) BY MOUTH 2 (TWO) TIMES A DAY., Disp: , Rfl: 3     calcium carbonate (OS-SHASHA) 500 MG tablet, Take 1 tablet (500 mg) by mouth 2 times daily, Disp: 180 tablet, Rfl: 3     cholecalciferol (VITAMIN D-1000 MAX ST) 1000 units TABS, Take 2,000 Units by mouth every morning , Disp: , Rfl:      diazepam (VALIUM) 5 MG tablet, Take 1 tablet (5 mg) by mouth once as needed for anxiety (MRI claustrophobia management), Disp: 2 tablet, Rfl: 3     EPINEPHrine (EPIPEN/ADRENACLICK/OR ANY BX GENERIC EQUIV) 0.3 MG/0.3ML injection 2-pack, As directed for bee stings, Disp: , Rfl:      ferrous fumarate 65 mg, Hus[place]. FE,-Vitamin C 125 mg (VITRON-C)  MG TABS tablet, Take 1 tablet by mouth 3 times daily, Disp: 270 tablet, Rfl: 1     fludrocortisone (FLORINEF) 0.1 MG tablet, Take 1 tablet (0.1 mg) by mouth daily, Disp: 90 tablet, Rfl: 3     FLUoxetine (PROZAC) 40 MG capsule, Take 40 mg by mouth daily, Disp: , Rfl:      gabapentin (NEURONTIN) 300 MG capsule, Take 300 mg by mouth 3 times daily, Disp: , Rfl:      hydrocortisone (CORTEF) 10 MG tablet, Take 2.5 pills (=25 mg) in AM and 1.5 pills (=15 mg) at 2 pm daily, additional as directed., Disp: 400 tablet, Rfl: 3     levothyroxine (SYNTHROID/LEVOTHROID) 125 MCG tablet, Take 1 tablet (125 mcg) by mouth daily, Disp: 90 tablet, Rfl: 3     mitotane (LYSODREN) 500 MG tablet, Take 2 tablets (1,000 mg) by mouth 2 times daily, Disp: 120 tablet, Rfl: 0     mitotane (LYSODREN) 500 MG tablet, Take 2 tablets (1,000 mg) by mouth 2 times daily, Disp: 120 tablet, Rfl: 0     Multiple Vitamins-Calcium (ONE-A-DAY WOMENS PO), Take 2 tablets by mouth every morning , Disp: , Rfl:      ondansetron (ZOFRAN) 8  "MG tablet, Take 1 tablet (8 mg) by mouth every 8 hours as needed for nausea, Disp: 30 tablet, Rfl: 0     prochlorperazine (COMPAZINE) 5 MG tablet, Take 1 tablet (5 mg) by mouth every 8 hours as needed for nausea or vomiting, Disp: 90 tablet, Rfl: 3     traZODone (DESYREL) 50 MG tablet, Take 2 tablets (100 mg) by mouth nightly as needed for sleep, Disp: 60 tablet, Rfl: 0     UNABLE TO FIND, medical cannabis (Patient's own supply. Not a prescription), Disp: , Rfl:      vitamin B1 (THIAMINE) 50 MG tablet, Take 1 tablet (50 mg) by mouth daily, Disp: 90 tablet, Rfl: 1    PHYSICAL EXAMINATION:  Vital signs: /73 (BP Location: Right arm, Patient Position: Sitting, Cuff Size: Adult Regular)   Pulse 74   Temp 97.7  F (36.5  C) (Oral)   Resp 16   Ht 1.626 m (5' 4.02\")   Wt 70 kg (154 lb 6.4 oz)   SpO2 98%   BMI 26.49 kg/m    ECOG performance status of 1.   GENERAL: Young lady, sitting in chair. Appears tremulous and fatigued, but in no acute distress.  HEENT: No icterus, no pallor. MMM, PERRL, OP clear.   NECK: Supple, no JVD/LAD.  LUNGS: CTAB, normal WOB on RA.  CARDIOVASCULAR: Regular rate and rhythm, no murmurs.   ABDOMEN: Soft, NT, ND, no masses appreciated, normal BS.   EXTREMITIES: No cyanosis, no clubbing, no edema.   NEUROLOGIC: Alert and fully oriented. Hands and feet are tremulous.   PSYCH: Affect flat, mood slightly anxious/depressed.    LABORATORY DATA:   Lab Test 02/26/20  1132 01/22/20  1647 12/12/19  1715   WBC 7.8 3.7* 3.5*   RBC 4.45 4.40 4.62   HGB 11.4* 10.8* 10.7*   HCT 37.4 35.7 36.7   MCV 84 81 79   MCH 25.6* 24.5* 23.2*   MCHC 30.5* 30.3* 29.2*   RDW 17.6* 18.2* 15.8*    190 239   NEUTROPHIL 74.9 75.4 74.4    139 140   POTASSIUM 3.4 4.4 4.2   CHLORIDE 104 106 108   CO2 27 29 29   ANIONGAP 6 4 4   GLC 83 108* 108*   BUN 12 10 11   CR 0.59 0.69 0.73   SHASHA 7.8* 7.8* 7.9*   PROTTOTAL 5.7* 5.5* 6.1*   ALBUMIN 2.8* 2.7* 3.0*   BILITOTAL 0.2 0.2 0.2   ALKPHOS 89 90 103   AST 18 14 14 " "  ALT 19 20 21     Lab Test 02/26/20  1132 01/22/20  1647 12/12/19  1715 11/05/19  1349 08/14/19  1050   TSH 0.48 0.27* 0.09* 0.11* 0.04*   T4 1.08 0.87 0.95 0.98 0.92     DHEAS was 740 on 6/5/18, and has been <15 on 7/12/18, 8/20/18, 9/19/18, 10/23/18, 11/27/18, 1/9/19, 5/8/19, 6/18/19, 7/17/19, 11/15/19, 12/12/19, 1/22/20.  Mitotane level (target 14-20): 1.8ng/dl on 8/20/18, 3.5 on 10/25/18, 6.2 on 12/4/18, 8.1 on 1/9/19 and 10.2 on 2/11/19, 14.2 on 6/18/19. 10.8 on 12/12/19.  10 on 1/24/20. Pending from this week.  Labs from [facility] 9/24/19 - WBC 4200, ANC 2600, Hb 10.4, Platelets 218K, lytes WNL, creat 0.61, Calcium 8, albumin 3.5, LFTs normal, mitotane level 18.8, cortisol 25.8, ACTH<5, aldosterone 4.9, free T4 1.17, TSH 0.07, DHEAS <15, renin plasma 10.6.     IMAGING STUDIES:  As above.    ASSESSMENT AND PLAN: A 34-year-old lady with right-sided functioning adrenocortical carcinoma, who is here for follow-up while on adjuvant mitotane.     Adrenocortical carcinoma:  - Started on adjuvant mitotane in July 2018 based on her presentation and tumor characteristics including invasion of the adrenal capsule, high mitotic rate, possibly positive margins, and high Ki67, which could result in a rate of recurrence as high as 40%.    - Reviewed restaging CT C/A/P from y'day that continues to show no evidence of disease recurrence. Previously noted 2mm nodule has not grown.  - Despite multiple supportive care interventions, patient has NOT been able to tolerate higher doses (above 2-3gm/day) of mitotane. Now doing better on 1000mg BID. She wants to go up a bit understanding fully well that side effects will increase.   - We've had numerous conversations regarding goals of therapy and that mitotane doesn't work on an \"all or none\" basis with no activity below therapeutic level. In her case, it is critical to not aim for a number but to actually aim for optimizing tolerance to therapy to allow continuation for another ~3 " years.  - Will try mitotane 1500mg QAM+1000 QPM on odd days alternating with 1000mg BID on even days for an average daily dose of 2250mg.   - Monthly Mitotane level, CBC, comprehensive panel, TSH, FT4, cholesterol panel, 24-hour Urine Free Cortisol, and DHEAS.   - Plan to continue adjuvant Mitotane for up to 5 yrs if tolerated.  - Next restaging CT C/A/P in approx 3-4 months.     Concern for ativan use disorder:  - I counseled her of the addictive potential of Ativan and she did agree that she has felt increasingly desiring this medication.   - We have stopped Ativan and I would encourage other medical providers to not give any further refills.  - Discussed health coping strategies including cognitive behavioral interventions. Seen by our psychology team as well. Will setup f/up visit.    Insomnia:  - Trazodone is working well but she's taking 150mg at bedtime. Further mgmt per PCP.   - EKG with QTc 488 ms in 2019. Will repeat in next 1-3 months.    Endocrinopathies:  - Mgmt per endocrine team at the . Follow-up planned in the next couple months.    Return to see me in ~4 weeks with labs. Patient and family given opportunity to ask questions, which were answered to their satisfaction. They're in complete agreement as planned.    BILLIN.     Benson Cintron M.D.  . Professor of Medicine  Genitourinary Oncology  Division of Hematology, Oncology & Transplantation  [facility]

## 2020-02-26 NOTE — LETTER
2/26/2020       RE: Suzy Rodríguez  [address]  [facility] [id number]     Dear Colleague,    Thank you for referring your patient, Suzy Rodríguez, to the [facility]. Please see a copy of my visit note below.    MEDICAL ONCOLOGY CLINIC NOTE    REFERRING PROVIDER: Warren Mansfield MD from [facility] Urologic Oncology clinic.     REASON FOR CURRENT VISIT: Follow-up while on mitotane as adjuvant therapy of adrenocortical carcinoma.      HISTORY OF PRESENT ILLNESS: Ms. Rodríguez is a 34-year-old lady with history notable for right-sided functioning adrenocortical carcinoma (diagnosed in May 2018), who is here for follow-up while on adjuvant mitotane. Her oncologic history is detailed as under.     Suzy has been stable since the last visit. She had developed significant, severe fatigue, slurred speech, muscle pain, insomnia, and tremors since being on the higher dose of mitotane 1500mg AM - 1500mg afternoon and 1000mg HS. These has improved quite a bit since dropping the dose to 1000mg BID and giving a brief treatment interruption. Now with only occasional slurred speech. Non-intentional low-amplitude tremors have significantly improved. Also on hydrocortisone 25mg QAM, 15mg early PM. No recurrence of episode of dizziness/LOC. No history of CVA/TIA/palpitations/CP/SOA etc. Using Compazine 10mg BID for chronic, mitotane-associated nausea. No diarrhea/abd pain/bloody BM. She has 1-2 lose BMs a day and uses Imodium PRN. Using trazodone 150mg every day for sleep sometimes. Has seen Christiano Marcelino PsyD.    Last visit with Dr. Hilton at [facility] was on 9/24/19 and mitotane level came back at 18.8. She also follows with Dr. Veena Jaquez in our endocrinology department. No clinical evidence of disease recurrence.     ONCOLOGIC HISTORY:   1. Right adrenocortical carcinoma, localized, high-risk (Ki67 20%; adrenal capsular invasion present, mitotic rate 15 per 50 HPF):  - Presented to  "emergency room on 5/8/2018 with acute onset left flank pain.   - CT abdomen and pelvis stone protocol without contrast 5 5720 showed \"9.2 x 8 cm heterogeneous right upper quadrant mass with small foci of calcification within it which is likely arising from the adrenal gland though inseparable from liver and upper pole of right kidney. It is incompletely evaluated on this noncontrast study. 3 x 2.6 cm mass right lobe of liver on image #85 also incompletely evaluated. 6 x 4 x 4 mm upper third left ureteral obstructing stone with mild to moderate secondary hydronephrosis. Collection of stones at lower pole left kidney extending over an area of approximately 6 x 7 x 4 mm. Additional nonobstructing 1 mm stone upper pole left kidney. 2 mm nonobstructing stone noted upper pole right kidney with no hydronephrosis on the right. Postop change consistent with gastric bypass. Noncontrast images of spleen, left adrenal gland, gallbladder, and pancreas unremarkable. No aneurysm. No adenopathy.\"  - Seen by Dr. Delvalle at [facility]rology clinic. Referred to Dr. Alvino Patel and then Dr. Warren Mansfield.  - Endocrinology team directed workup showed high DHEAS level of 740 pre-surgery.   - Right open adrenalectomy and hepatic mobilization performed by Dr. Warren Mansfield on 6/25/2018. Pathology showed adrenocortical carcinoma measuring 11.3 cm, weighing 413 g with extension into or through the adrenal capsule negative lymphovascular invasion, tumor necrosis present, margins involved by tumor, no regional lymph nodes identified, pathologic stage T2 NX.  - DHEAS normalized post-surgery.   - Adjuvant Mitotane started on 7/24/18. Dose increased to 2000mg TID around 10/23/18 due to persistently low troughs. Held 1/16/19 for two weeks and resumed 1/30 at 1000 mg po BID due to very poor tolerance of higher doses. Highest mitotane level was 10.2 on 2/11/19. Was taking 1500 AM-1500 afternoon-1000mg PM June-Aug 2019, but unable to " "tolerate. Dose reduced to 1000mg TID as of 08/14/19. Dose interrupted on 9/25/19 by Dr. Hilton due to severe side effects. Plan to resume therapy at 1000mg BID in mid Oct.  - Saw radiation oncology at [facility], radiation oncology at [facility], as well as endocrine oncology (Dr. Hilton) at [facility].   - S/p adjuvant RT by Dr. Monsivais - 5040 cGy over 30 fr (8/24/18-10/6/18).  - 2/11/19: CT C/A/P and MRI brain with contrast - no evidence of disease recurrence.  - 05/07/19: CT C/A/P with contrast - \"1. No evidence for locally recurrent disease. 2. No evidence for metastatic disease in the chest, abdomen, or pelvis. 3. A 2.5 x 2.2 cm peripherally enhancing mass in hepatic segment 5, unchanged enhancing likely hemangioma given the peripheral discontinuous enhancement on previous examination. No other suspicious liver lesions.\"  - 08/14/19: CT C/A/P with contrast - AFUA.  - 11/5/19: CT C/A/P with contrast - \"1. New tiny (2mm) right upper lobe pulmonary nodule of questionable clinical significance. Recommend further attention to this at imaging follow-up.  2. No other new disease identified. 3. New large left ovarian cyst. Resolution of the prominent right ovarian cyst described on the prior exam.\"  - 12/12/19: CT Chest with contrast - stable 1mm lung nodule. No evidence of metastatic disease.  - 02/25/20: CT C/A/P with contrast - AFUA.     REVIEW OF SYSTEMS: 14 point ROS negative other than the symptoms noted above in the HPI.    PAST MEDICAL AND SURGICAL HISTORY: Reviewed today.  1. Right-sided adrenocortical carcinoma.  2. MHTFR mutation with h/o mutiple miscarriages.  3. Ovarian cysts diagnosed in 2011.  4. H/o gastric bypass in 2016 for obesity.    SOCIAL HISTORY:   Social History     Tobacco Use     Smoking status: Never Smoker     Smokeless tobacco: Never Used   Substance Use Topics     Alcohol use: Yes     Comment: occ.     Drug use: No     FAMILY HISTORY:   No clustering of " malignancies.    ALLERGIES:   Allergies   Allergen Reactions     Bee Venom Swelling     Ibuprofen Hives, Swelling and Other (See Comments)     CURRENT MEDICATIONS:   Current Outpatient Medications:      acetaminophen (TYLENOL) 325 MG tablet, Take 2 tablets (650 mg) by mouth every 4 hours as needed for other (multimodal surgical pain management along with NSAIDS and opioid medication as indicated based on pain control and physical function.), Disp: 150 tablet, Rfl: 0     buPROPion (WELLBUTRIN) 100 MG tablet, TAKE 1 TABLET (100 MG TOTAL) BY MOUTH 2 (TWO) TIMES A DAY., Disp: , Rfl: 3     calcium carbonate (OS-SHASHA) 500 MG tablet, Take 1 tablet (500 mg) by mouth 2 times daily, Disp: 180 tablet, Rfl: 3     cholecalciferol (VITAMIN D-1000 MAX ST) 1000 units TABS, Take 2,000 Units by mouth every morning , Disp: , Rfl:      diazepam (VALIUM) 5 MG tablet, Take 1 tablet (5 mg) by mouth once as needed for anxiety (MRI claustrophobia management), Disp: 2 tablet, Rfl: 3     EPINEPHrine (EPIPEN/ADRENACLICK/OR ANY BX GENERIC EQUIV) 0.3 MG/0.3ML injection 2-pack, As directed for bee stings, Disp: , Rfl:      ferrous fumarate 65 mg, [place]. FE,-Vitamin C 125 mg (VITRON-C)  MG TABS tablet, Take 1 tablet by mouth 3 times daily, Disp: 270 tablet, Rfl: 1     fludrocortisone (FLORINEF) 0.1 MG tablet, Take 1 tablet (0.1 mg) by mouth daily, Disp: 90 tablet, Rfl: 3     FLUoxetine (PROZAC) 40 MG capsule, Take 40 mg by mouth daily, Disp: , Rfl:      gabapentin (NEURONTIN) 300 MG capsule, Take 300 mg by mouth 3 times daily, Disp: , Rfl:      hydrocortisone (CORTEF) 10 MG tablet, Take 2.5 pills (=25 mg) in AM and 1.5 pills (=15 mg) at 2 pm daily, additional as directed., Disp: 400 tablet, Rfl: 3     levothyroxine (SYNTHROID/LEVOTHROID) 125 MCG tablet, Take 1 tablet (125 mcg) by mouth daily, Disp: 90 tablet, Rfl: 3     mitotane (LYSODREN) 500 MG tablet, Take 2 tablets (1,000 mg) by mouth 2 times daily, Disp: 120 tablet, Rfl: 0     mitotane  "(LYSODREN) 500 MG tablet, Take 2 tablets (1,000 mg) by mouth 2 times daily, Disp: 120 tablet, Rfl: 0     Multiple Vitamins-Calcium (ONE-A-DAY WOMENS PO), Take 2 tablets by mouth every morning , Disp: , Rfl:      ondansetron (ZOFRAN) 8 MG tablet, Take 1 tablet (8 mg) by mouth every 8 hours as needed for nausea, Disp: 30 tablet, Rfl: 0     prochlorperazine (COMPAZINE) 5 MG tablet, Take 1 tablet (5 mg) by mouth every 8 hours as needed for nausea or vomiting, Disp: 90 tablet, Rfl: 3     traZODone (DESYREL) 50 MG tablet, Take 2 tablets (100 mg) by mouth nightly as needed for sleep, Disp: 60 tablet, Rfl: 0     UNABLE TO FIND, medical cannabis (Patient's own supply. Not a prescription), Disp: , Rfl:      vitamin B1 (THIAMINE) 50 MG tablet, Take 1 tablet (50 mg) by mouth daily, Disp: 90 tablet, Rfl: 1    PHYSICAL EXAMINATION:  Vital signs: /73 (BP Location: Right arm, Patient Position: Sitting, Cuff Size: Adult Regular)   Pulse 74   Temp 97.7  F (36.5  C) (Oral)   Resp 16   Ht 1.626 m (5' 4.02\")   Wt 70 kg (154 lb 6.4 oz)   SpO2 98%   BMI 26.49 kg/m     ECOG performance status of 1.   GENERAL: Young lady, sitting in chair. Appears tremulous and fatigued, but in no acute distress.  HEENT: No icterus, no pallor. MMM, PERRL, OP clear.   NECK: Supple, no JVD/LAD.  LUNGS: CTAB, normal WOB on RA.  CARDIOVASCULAR: Regular rate and rhythm, no murmurs.   ABDOMEN: Soft, NT, ND, no masses appreciated, normal BS.   EXTREMITIES: No cyanosis, no clubbing, no edema.   NEUROLOGIC: Alert and fully oriented. Hands and feet are tremulous.   PSYCH: Affect flat, mood slightly anxious/depressed.    LABORATORY DATA:   Lab Test 02/26/20  1132 01/22/20  1647 12/12/19  1715   WBC 7.8 3.7* 3.5*   RBC 4.45 4.40 4.62   HGB 11.4* 10.8* 10.7*   HCT 37.4 35.7 36.7   MCV 84 81 79   MCH 25.6* 24.5* 23.2*   MCHC 30.5* 30.3* 29.2*   RDW 17.6* 18.2* 15.8*    190 239   NEUTROPHIL 74.9 75.4 74.4    139 140   POTASSIUM 3.4 4.4 4.2 "   CHLORIDE 104 106 108   CO2 27 29 29   ANIONGAP 6 4 4   GLC 83 108* 108*   BUN 12 10 11   CR 0.59 0.69 0.73   SHASHA 7.8* 7.8* 7.9*   PROTTOTAL 5.7* 5.5* 6.1*   ALBUMIN 2.8* 2.7* 3.0*   BILITOTAL 0.2 0.2 0.2   ALKPHOS 89 90 103   AST 18 14 14   ALT 19 20 21     Lab Test 02/26/20  1132 01/22/20  1647 12/12/19  1715 11/05/19  1349 08/14/19  1050   TSH 0.48 0.27* 0.09* 0.11* 0.04*   T4 1.08 0.87 0.95 0.98 0.92     DHEAS was 740 on 6/5/18, and has been <15 on 7/12/18, 8/20/18, 9/19/18, 10/23/18, 11/27/18, 1/9/19, 5/8/19, 6/18/19, 7/17/19, 11/15/19, 12/12/19, 1/22/20.  Mitotane level (target 14-20): 1.8ng/dl on 8/20/18, 3.5 on 10/25/18, 6.2 on 12/4/18, 8.1 on 1/9/19 and 10.2 on 2/11/19, 14.2 on 6/18/19. 10.8 on 12/12/19.  10 on 1/24/20. Pending from this week.  Labs from [facility] 9/24/19 - WBC 4200, ANC 2600, Hb 10.4, Platelets 218K, lytes WNL, creat 0.61, Calcium 8, albumin 3.5, LFTs normal, mitotane level 18.8, cortisol 25.8, ACTH<5, aldosterone 4.9, free T4 1.17, TSH 0.07, DHEAS <15, renin plasma 10.6.     IMAGING STUDIES:  As above.    ASSESSMENT AND PLAN: A 34-year-old lady with right-sided functioning adrenocortical carcinoma, who is here for follow-up while on adjuvant mitotane.     Adrenocortical carcinoma:  - Started on adjuvant mitotane in July 2018 based on her presentation and tumor characteristics including invasion of the adrenal capsule, high mitotic rate, possibly positive margins, and high Ki67, which could result in a rate of recurrence as high as 40%.    - Reviewed restaging CT C/A/P from y'day that continues to show no evidence of disease recurrence. Previously noted 2mm nodule has not grown.  - Despite multiple supportive care interventions, patient has NOT been able to tolerate higher doses (above 2-3gm/day) of mitotane. Now doing better on 1000mg BID. She wants to go up a bit understanding fully well that side effects will increase.   - We've had numerous conversations regarding goals of therapy and  "that mitotane doesn't work on an \"all or none\" basis with no activity below therapeutic level. In her case, it is critical to not aim for a number but to actually aim for optimizing tolerance to therapy to allow continuation for another ~3 years.  - Will try mitotane 1500mg QAM+1000 QPM on odd days alternating with 1000mg BID on even days for an average daily dose of 2250mg.   - Monthly Mitotane level, CBC, comprehensive panel, TSH, FT4, cholesterol panel, 24-hour Urine Free Cortisol, and DHEAS.   - Plan to continue adjuvant Mitotane for up to 5 yrs if tolerated.  - Next restaging CT C/A/P in approx 3-4 months.     Concern for ativan use disorder:  - I counseled her of the addictive potential of Ativan and she did agree that she has felt increasingly desiring this medication.   - We have stopped Ativan and I would encourage other medical providers to not give any further refills.  - Discussed health coping strategies including cognitive behavioral interventions. Seen by our psychology team as well. Will setup f/up visit.    Insomnia:  - Trazodone is working well but she's taking 150mg at bedtime. Further mgmt per PCP.   - EKG with QTc 488 ms in 2019. Will repeat in next 1-3 months.    Endocrinopathies:  - Mgmt per endocrine team at the . Follow-up planned in the next couple months.    Return to see me in ~4 weeks with labs. Patient and family given opportunity to ask questions, which were answered to their satisfaction. They're in complete agreement as planned.    BILLIN.     Benson Cintron M.D.  . Professor of Medicine  Genitourinary Oncology  Division of Hematology, Oncology & Transplantation  [facility]      "

## 2020-02-26 NOTE — NURSING NOTE
"Oncology Rooming Note    February 26, 2020 11:52 AM   Suzy Rodríguez is a 34 year old female who presents for:    Chief Complaint   Patient presents with     Blood Draw     labs drawn with vpt by rn.  vs taken     Oncology Clinic Visit     Return visit; adrenal cortex cancer     Initial Vitals: /73 (BP Location: Right arm, Patient Position: Sitting, Cuff Size: Adult Regular)   Pulse 74   Temp 97.7  F (36.5  C) (Oral)   Resp 16   Ht 1.626 m (5' 4.02\")   Wt 70 kg (154 lb 6.4 oz)   SpO2 98%   BMI 26.49 kg/m   Estimated body mass index is 26.49 kg/m  as calculated from the following:    Height as of this encounter: 1.626 m (5' 4.02\").    Weight as of this encounter: 70 kg (154 lb 6.4 oz). Body surface area is 1.78 meters squared.  Moderate Pain (5) Comment: Data Unavailable   No LMP recorded. (Menstrual status: IUD).  Allergies reviewed: Yes  Medications reviewed: Yes    Medications: Medication refills not needed today.  Pharmacy name entered into [facility]: [facility]  [place] [id number]    Clinical concerns: No new concerns today. Dr. Cintron was notified.      MADELAINE GOMES CMA            "

## 2020-02-26 NOTE — PATIENT INSTRUCTIONS
INCREASE MITOTANE AS FOLLOWS:  1. Take 1500mg in morning and 1000mg at bedtime on odd days of the month  2. Take 1000mg in morning and 1000mg at bedtime on even days of the month    Average daily dose is 2250mg.    Continue for 1 month.

## 2020-02-27 LAB — DHEA-S SERPL-MCNC: <15 UG/DL (ref 35–430)

## 2020-03-01 ENCOUNTER — COMMUNICATION - HEALTHEAST (OUTPATIENT)
Dept: FAMILY MEDICINE | Facility: CLINIC | Age: 35
End: 2020-03-01

## 2020-03-01 DIAGNOSIS — F41.9 ANXIETY: ICD-10-CM

## 2020-03-02 ENCOUNTER — TELEPHONE (OUTPATIENT)
Dept: ONCOLOGY | Facility: CLINIC | Age: 35
End: 2020-03-02

## 2020-03-02 DIAGNOSIS — C74.01 MALIGNANT NEOPLASM OF CORTEX OF RIGHT ADRENAL GLAND (H): Primary | ICD-10-CM

## 2020-03-02 RX ORDER — SODIUM CHLORIDE 9 MG/ML
1000 INJECTION, SOLUTION INTRAVENOUS CONTINUOUS PRN
Status: CANCELLED
Start: 2020-03-02

## 2020-03-02 RX ORDER — METHYLPREDNISOLONE SODIUM SUCCINATE 125 MG/2ML
125 INJECTION, POWDER, LYOPHILIZED, FOR SOLUTION INTRAMUSCULAR; INTRAVENOUS
Status: CANCELLED
Start: 2020-03-02

## 2020-03-02 RX ORDER — DIPHENHYDRAMINE HYDROCHLORIDE 50 MG/ML
50 INJECTION INTRAMUSCULAR; INTRAVENOUS
Status: CANCELLED
Start: 2020-03-02

## 2020-03-02 RX ORDER — ALBUTEROL SULFATE 90 UG/1
1-2 AEROSOL, METERED RESPIRATORY (INHALATION)
Status: CANCELLED
Start: 2020-03-02

## 2020-03-02 RX ORDER — EPINEPHRINE 1 MG/ML
0.3 INJECTION, SOLUTION INTRAMUSCULAR; SUBCUTANEOUS EVERY 5 MIN PRN
Status: CANCELLED | OUTPATIENT
Start: 2020-03-02

## 2020-03-02 RX ORDER — EPINEPHRINE 0.3 MG/.3ML
0.3 INJECTION SUBCUTANEOUS EVERY 5 MIN PRN
Status: CANCELLED | OUTPATIENT
Start: 2020-03-02

## 2020-03-02 RX ORDER — MEPERIDINE HYDROCHLORIDE 25 MG/ML
25 INJECTION INTRAMUSCULAR; INTRAVENOUS; SUBCUTANEOUS EVERY 30 MIN PRN
Status: CANCELLED | OUTPATIENT
Start: 2020-03-02

## 2020-03-02 RX ORDER — ALBUTEROL SULFATE 0.83 MG/ML
2.5 SOLUTION RESPIRATORY (INHALATION)
Status: CANCELLED | OUTPATIENT
Start: 2020-03-02

## 2020-03-05 ENCOUNTER — TELEPHONE (OUTPATIENT)
Dept: PHARMACY | Facility: CLINIC | Age: 35
End: 2020-03-05

## 2020-03-05 NOTE — TELEPHONE ENCOUNTER
Oral Chemotherapy Program    Confirmed with Suzy that she is taking her mitotane  1500mg in morning and 1000mg at bedtime on odd days of the month  and 1000mg in morning and 1000mg at bedtime on even days of the month     Average daily dose is 2250mg.    She stated that she is tolerating the dose well and she has no further questions.    Tevin Dyson, PharmD, BCOP  March 5, 2020

## 2020-03-10 LAB — LAB SCANNED RESULT: NORMAL

## 2020-03-23 RX ORDER — DIPHENHYDRAMINE HYDROCHLORIDE 50 MG/ML
50 INJECTION INTRAMUSCULAR; INTRAVENOUS
Status: CANCELLED
Start: 2020-03-27

## 2020-03-23 RX ORDER — EPINEPHRINE 1 MG/ML
0.3 INJECTION, SOLUTION, CONCENTRATE INTRAVENOUS EVERY 5 MIN PRN
Status: CANCELLED | OUTPATIENT
Start: 2020-03-27

## 2020-03-23 RX ORDER — EPINEPHRINE 0.3 MG/.3ML
0.3 INJECTION SUBCUTANEOUS EVERY 5 MIN PRN
Status: CANCELLED | OUTPATIENT
Start: 2020-03-27

## 2020-03-23 RX ORDER — SODIUM CHLORIDE 9 MG/ML
1000 INJECTION, SOLUTION INTRAVENOUS CONTINUOUS PRN
Status: CANCELLED
Start: 2020-03-27

## 2020-03-23 RX ORDER — ALBUTEROL SULFATE 90 UG/1
1-2 AEROSOL, METERED RESPIRATORY (INHALATION)
Status: CANCELLED
Start: 2020-03-27

## 2020-03-23 RX ORDER — MEPERIDINE HYDROCHLORIDE 25 MG/ML
25 INJECTION INTRAMUSCULAR; INTRAVENOUS; SUBCUTANEOUS EVERY 30 MIN PRN
Status: CANCELLED | OUTPATIENT
Start: 2020-03-27

## 2020-03-23 RX ORDER — ALBUTEROL SULFATE 0.83 MG/ML
2.5 SOLUTION RESPIRATORY (INHALATION)
Status: CANCELLED | OUTPATIENT
Start: 2020-03-27

## 2020-03-29 ENCOUNTER — COMMUNICATION - HEALTHEAST (OUTPATIENT)
Dept: FAMILY MEDICINE | Facility: CLINIC | Age: 35
End: 2020-03-29

## 2020-03-29 DIAGNOSIS — F41.9 ANXIETY: ICD-10-CM

## 2020-03-30 ENCOUNTER — CARE COORDINATION (OUTPATIENT)
Dept: ONCOLOGY | Facility: CLINIC | Age: 35
End: 2020-03-30

## 2020-03-30 ENCOUNTER — MYC MEDICAL ADVICE (OUTPATIENT)
Dept: ONCOLOGY | Facility: CLINIC | Age: 35
End: 2020-03-30

## 2020-03-30 DIAGNOSIS — C74.01 MALIGNANT NEOPLASM OF CORTEX OF RIGHT ADRENAL GLAND (H): Primary | ICD-10-CM

## 2020-03-30 DIAGNOSIS — C74.01 MALIGNANT NEOPLASM OF CORTEX OF RIGHT ADRENAL GLAND (H): ICD-10-CM

## 2020-03-30 LAB
ALBUMIN SERPL-MCNC: 3 G/DL (ref 3.4–5)
ALP SERPL-CCNC: 88 U/L (ref 40–150)
ALT SERPL W P-5'-P-CCNC: 18 U/L (ref 0–50)
ANION GAP SERPL CALCULATED.3IONS-SCNC: 4 MMOL/L (ref 3–14)
AST SERPL W P-5'-P-CCNC: 17 U/L (ref 0–45)
BASOPHILS # BLD AUTO: 0 10E9/L (ref 0–0.2)
BASOPHILS NFR BLD AUTO: 0 %
BILIRUB SERPL-MCNC: 0.3 MG/DL (ref 0.2–1.3)
BUN SERPL-MCNC: 6 MG/DL (ref 7–30)
CALCIUM SERPL-MCNC: 7.8 MG/DL (ref 8.5–10.1)
CHLORIDE SERPL-SCNC: 102 MMOL/L (ref 94–109)
CHOLEST SERPL-MCNC: 185 MG/DL
CO2 SERPL-SCNC: 32 MMOL/L (ref 20–32)
CREAT SERPL-MCNC: 0.66 MG/DL (ref 0.52–1.04)
DIFFERENTIAL METHOD BLD: ABNORMAL
EOSINOPHIL # BLD AUTO: 0 10E9/L (ref 0–0.7)
EOSINOPHIL NFR BLD AUTO: 0.7 %
ERYTHROCYTE [DISTWIDTH] IN BLOOD BY AUTOMATED COUNT: 17.1 % (ref 10–15)
GFR SERPL CREATININE-BSD FRML MDRD: >90 ML/MIN/{1.73_M2}
GLUCOSE SERPL-MCNC: 120 MG/DL (ref 70–99)
HCT VFR BLD AUTO: 40.8 % (ref 35–47)
HDLC SERPL-MCNC: 112 MG/DL
HGB BLD-MCNC: 12.8 G/DL (ref 11.7–15.7)
LDLC SERPL CALC-MCNC: 38 MG/DL
LYMPHOCYTES # BLD AUTO: 0.8 10E9/L (ref 0.8–5.3)
LYMPHOCYTES NFR BLD AUTO: 15.1 %
MCH RBC QN AUTO: 27.9 PG (ref 26.5–33)
MCHC RBC AUTO-ENTMCNC: 31.4 G/DL (ref 31.5–36.5)
MCV RBC AUTO: 89 FL (ref 78–100)
MONOCYTES # BLD AUTO: 0.2 10E9/L (ref 0–1.3)
MONOCYTES NFR BLD AUTO: 4.3 %
NEUTROPHILS # BLD AUTO: 4.4 10E9/L (ref 1.6–8.3)
NEUTROPHILS NFR BLD AUTO: 79.9 %
NONHDLC SERPL-MCNC: 73 MG/DL
PLATELET # BLD AUTO: 159 10E9/L (ref 150–450)
POTASSIUM SERPL-SCNC: 3 MMOL/L (ref 3.4–5.3)
PROT SERPL-MCNC: 6.1 G/DL (ref 6.8–8.8)
RBC # BLD AUTO: 4.59 10E12/L (ref 3.8–5.2)
SODIUM SERPL-SCNC: 138 MMOL/L (ref 133–144)
T4 FREE SERPL-MCNC: 1.06 NG/DL (ref 0.76–1.46)
TRIGL SERPL-MCNC: 173 MG/DL
TSH SERPL DL<=0.005 MIU/L-ACNC: 0.37 MU/L (ref 0.4–4)
WBC # BLD AUTO: 5.6 10E9/L (ref 4–11)

## 2020-03-30 PROCEDURE — 99000 SPECIMEN HANDLING OFFICE-LAB: CPT | Performed by: INTERNAL MEDICINE

## 2020-03-30 PROCEDURE — 80061 LIPID PANEL: CPT | Performed by: INTERNAL MEDICINE

## 2020-03-30 PROCEDURE — 80050 GENERAL HEALTH PANEL: CPT | Performed by: INTERNAL MEDICINE

## 2020-03-30 PROCEDURE — 36415 COLL VENOUS BLD VENIPUNCTURE: CPT | Performed by: INTERNAL MEDICINE

## 2020-03-30 PROCEDURE — 80375 DRUG/SUBSTANCE NOS 1-3: CPT | Mod: 90 | Performed by: INTERNAL MEDICINE

## 2020-03-30 PROCEDURE — 84439 ASSAY OF FREE THYROXINE: CPT | Performed by: INTERNAL MEDICINE

## 2020-03-30 PROCEDURE — 82627 DEHYDROEPIANDROSTERONE: CPT | Performed by: INTERNAL MEDICINE

## 2020-03-31 ENCOUNTER — VIRTUAL VISIT (OUTPATIENT)
Dept: ONCOLOGY | Facility: CLINIC | Age: 35
End: 2020-03-31
Attending: INTERNAL MEDICINE
Payer: COMMERCIAL

## 2020-03-31 DIAGNOSIS — C74.01 MALIGNANT NEOPLASM OF CORTEX OF RIGHT ADRENAL GLAND (H): Primary | ICD-10-CM

## 2020-03-31 LAB
DHEA-S SERPL-MCNC: <15 UG/DL (ref 35–430)
MISCELLANEOUS TEST: NORMAL

## 2020-03-31 PROCEDURE — 40000114 ZZH STATISTIC NO CHARGE CLINIC VISIT

## 2020-03-31 PROCEDURE — 99214 OFFICE O/P EST MOD 30 MIN: CPT | Mod: GT | Performed by: INTERNAL MEDICINE

## 2020-03-31 NOTE — PROGRESS NOTES
"MEDICAL ONCOLOGY VIRTUAL VISIT    Suzy Rodríguez is a 34 year old female who is being evaluated via a billable video visit.      The patient has been notified of following:     \"This video visit will be conducted via a call between you and your physician/provider. We have found that certain health care needs can be provided without the need for an in-person physical exam.  This service lets us provide the care you need with a video conversation.  If a prescription is necessary we can send it directly to your pharmacy.  If lab work is needed we can place an order for that and you can then stop by our lab to have the test done at a later time.    If during the course of the call the physician/provider feels a video visit is not appropriate, you will not be charged for this service.\"     Patient has given verbal consent for Video visit? Yes    Patient would like the video invitation sent by: Text to cell phone: [phone]    Video Start Time: 4:14 PM    Suzy Rodríguez complains of    Chief Complaint   Patient presents with     Video Visit     CARCINOMA ADRENAL CORTICAL     I have reviewed and updated the patient's allergy and Medication List. Patient needs refill on Mitotane.    ALLERGIES  Bee venom and Ibuprofen    PHYSICIAN NOTE  Ms. 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    Suzy has been stable since the last visit. She had developed significant, severe fatigue, slurred speech, muscle pain, insomnia, and tremors since being on the higher dose of mitotane 1500mg AM - 1500mg afternoon and 1000mg HS. These has improved quite a bit since dropping the dose to 1000mg BID and giving a brief treatment interruption. She's been on mitotane 1500mg QAM+1000 QPM on odd days alternating with 1000mg BID on even days for an average daily dose of 2250mg x 4 weeks.  Now with only occasional " slurred speech. Non-intentional low-amplitude tremors have significantly improved.     Also on hydrocortisone 30mg QAM, 20mg early PM. No recurrence of episode of dizziness/LOC. No history of CVA/TIA/palpitations/SOA etc. Occasional dull CP (epigastric) right after drinking caffeine-rich drinks. No relationship with exertion. H/o GERD but none recently.     Using Compazine 10mg BID for chronic, mitotane-associated nausea. No diarrhea/abd pain/bloody BM. She has 1-2 lose BMs a day and uses Imodium PRN. Using trazodone 150mg every day for sleep sometimes. Has seen Christiano Marcelino PsyD.     Last visit (virtual) with Dr. Hilton at [facility] was on 3/24/20 and mitotane level came back at ~10. She also follows with Dr. Veena Jaquez in our endocrinology department. No clinical evidence of disease recurrence.     Assessment/Plan: A 34-year-old lady with right-sided functioning adrenocortical carcinoma.     Adrenocortical carcinoma:  - Started on adjuvant mitotane in July 2018 based on her presentation and tumor characteristics including invasion of the adrenal capsule, high mitotic rate, possibly positive margins, and high Ki67, which could result in a rate of recurrence as high as 40%.    - Restaging CT C/A/P in Feb showed no evidence of disease recurrence. Previously noted 2mm nodule has not grown.  - Despite multiple supportive care interventions, patient has NOT been able to tolerate higher doses (above 2-3gm/day) of mitotane.   - Will continue mitotane 1500mg QAM+1000 QPM on odd days alternating with 1000mg BID on even days for an average daily dose of 2250mg.   - Monthly Mitotane level, CBC, comprehensive panel, TSH, FT4, cholesterol panel, 24-hour Urine Free Cortisol, and DHEAS.   - Plan to continue adjuvant Mitotane for up to 5 yrs if tolerated.  - Next restaging CT C/A/P in approx 2-3 months.      Insomnia:  - Trazodone is working well but she's taking 150mg at bedtime. Further mgmt per PCP.   - EKG with QTc 488 ms  in 2019. Will repeat in next 1-3 months.     Hypokalemia, mild:  - Advised on K rich food for a week. Monitor.  - Try Tums for possible GERD-related symptoms.     Endocrinopathies:  - Mgmt per endocrine team at the . Follow-up planned in the next couple months.     Return to see me in ~4 weeks (virtual visit) with labs. Patient and family given opportunity to ask questions, which were answered to their satisfaction. They're in complete agreement.     Video-Visit Details  Type of service:  Video Visit  Video End Time (time video stopped): 4:35 PM  Originating Location (pt. Location): Home    Distant Location (provider location):  [facility]     Mode of Communication:  Video Conference via CSID    BILLIN.     Benson Cintron M.D.  . Professor of Medicine  Genitourinary Oncology  Division of Hematology, Oncology & Transplantation  [facility]

## 2020-04-02 ENCOUNTER — COMMUNICATION - HEALTHEAST (OUTPATIENT)
Dept: FAMILY MEDICINE | Facility: CLINIC | Age: 35
End: 2020-04-02

## 2020-04-02 DIAGNOSIS — F41.9 ANXIETY: ICD-10-CM

## 2020-04-10 ENCOUNTER — COMMUNICATION - HEALTHEAST (OUTPATIENT)
Dept: FAMILY MEDICINE | Facility: CLINIC | Age: 35
End: 2020-04-10

## 2020-04-10 ENCOUNTER — OFFICE VISIT - HEALTHEAST (OUTPATIENT)
Dept: FAMILY MEDICINE | Facility: CLINIC | Age: 35
End: 2020-04-10

## 2020-04-10 DIAGNOSIS — F41.9 ANXIETY: ICD-10-CM

## 2020-04-10 ASSESSMENT — ANXIETY QUESTIONNAIRES
4. TROUBLE RELAXING: NEARLY EVERY DAY
3. WORRYING TOO MUCH ABOUT DIFFERENT THINGS: NEARLY EVERY DAY
GAD7 TOTAL SCORE: 21
7. FEELING AFRAID AS IF SOMETHING AWFUL MIGHT HAPPEN: NEARLY EVERY DAY
6. BECOMING EASILY ANNOYED OR IRRITABLE: NEARLY EVERY DAY
1. FEELING NERVOUS, ANXIOUS, OR ON EDGE: NEARLY EVERY DAY
2. NOT BEING ABLE TO STOP OR CONTROL WORRYING: NEARLY EVERY DAY
5. BEING SO RESTLESS THAT IT IS HARD TO SIT STILL: NEARLY EVERY DAY

## 2020-04-10 ASSESSMENT — PATIENT HEALTH QUESTIONNAIRE - PHQ9: SUM OF ALL RESPONSES TO PHQ QUESTIONS 1-9: 19

## 2020-04-14 LAB — LAB SCANNED RESULT: NORMAL

## 2020-04-16 ENCOUNTER — TELEPHONE (OUTPATIENT)
Dept: ONCOLOGY | Facility: CLINIC | Age: 35
End: 2020-04-16

## 2020-04-16 DIAGNOSIS — C74.01 MALIGNANT NEOPLASM OF CORTEX OF RIGHT ADRENAL GLAND (H): Primary | ICD-10-CM

## 2020-04-16 NOTE — TELEPHONE ENCOUNTER
Date Received in Clinic: n/a  Name/Type of Form: Lab orders  Date Completed: 4/16/2020  Copy Mailed to patient: no  Disposition of Form: Faxed to  [facility] at [phone]    LC LaneN, RN  RN Care Coordinator  [facility]  Dr. Benson Cintron

## 2020-04-28 ENCOUNTER — AMBULATORY - HEALTHEAST (OUTPATIENT)
Dept: LAB | Facility: HOSPITAL | Age: 35
End: 2020-04-28

## 2020-04-28 DIAGNOSIS — C74.90: ICD-10-CM

## 2020-04-28 LAB
ALBUMIN SERPL-MCNC: 3.1 G/DL (ref 3.5–5)
ALP SERPL-CCNC: 86 U/L (ref 45–120)
ALT SERPL W P-5'-P-CCNC: 13 U/L (ref 0–45)
ANION GAP SERPL CALCULATED.3IONS-SCNC: 5 MMOL/L (ref 5–18)
AST SERPL W P-5'-P-CCNC: 15 U/L (ref 0–40)
BASOPHILS # BLD AUTO: 0 THOU/UL (ref 0–0.2)
BASOPHILS NFR BLD AUTO: 0 % (ref 0–2)
BILIRUB SERPL-MCNC: 0.3 MG/DL (ref 0–1)
BUN SERPL-MCNC: 10 MG/DL (ref 8–22)
CALCIUM SERPL-MCNC: 8 MG/DL (ref 8.5–10.5)
CHLORIDE BLD-SCNC: 107 MMOL/L (ref 98–107)
CHOLEST SERPL-MCNC: 187 MG/DL
CO2 SERPL-SCNC: 27 MMOL/L (ref 22–31)
CREAT SERPL-MCNC: 0.79 MG/DL (ref 0.6–1.1)
EOSINOPHIL # BLD AUTO: 0 THOU/UL (ref 0–0.4)
EOSINOPHIL NFR BLD AUTO: 0 % (ref 0–6)
ERYTHROCYTE [DISTWIDTH] IN BLOOD BY AUTOMATED COUNT: 16 % (ref 11–14.5)
FASTING STATUS PATIENT QL REPORTED: NO
GFR SERPL CREATININE-BSD FRML MDRD: >60 ML/MIN/1.73M2
GLUCOSE BLD-MCNC: 103 MG/DL (ref 70–125)
HCT VFR BLD AUTO: 40.5 % (ref 35–47)
HDLC SERPL-MCNC: 83 MG/DL
HGB BLD-MCNC: 13.3 G/DL (ref 12–16)
LDLC SERPL CALC-MCNC: 86 MG/DL
LYMPHOCYTES # BLD AUTO: 1 THOU/UL (ref 0.8–4.4)
LYMPHOCYTES NFR BLD AUTO: 18 % (ref 20–40)
MCH RBC QN AUTO: 29.4 PG (ref 27–34)
MCHC RBC AUTO-ENTMCNC: 32.8 G/DL (ref 32–36)
MCV RBC AUTO: 90 FL (ref 80–100)
MONOCYTES # BLD AUTO: 0.3 THOU/UL (ref 0–0.9)
MONOCYTES NFR BLD AUTO: 5 % (ref 2–10)
NEUTROPHILS # BLD AUTO: 4.2 THOU/UL (ref 2–7.7)
NEUTROPHILS NFR BLD AUTO: 76 % (ref 50–70)
PLATELET # BLD AUTO: 194 THOU/UL (ref 140–440)
PMV BLD AUTO: 9.8 FL (ref 8.5–12.5)
POTASSIUM BLD-SCNC: 4 MMOL/L (ref 3.5–5)
PROT SERPL-MCNC: 5.8 G/DL (ref 6–8)
RBC # BLD AUTO: 4.52 MILL/UL (ref 3.8–5.4)
SODIUM SERPL-SCNC: 139 MMOL/L (ref 136–145)
TRIGL SERPL-MCNC: 90 MG/DL
TSH SERPL DL<=0.005 MIU/L-ACNC: 0.15 UIU/ML (ref 0.3–5)
WBC: 5.6 THOU/UL (ref 4–11)

## 2020-04-28 RX ORDER — EPINEPHRINE 0.3 MG/.3ML
0.3 INJECTION SUBCUTANEOUS EVERY 5 MIN PRN
Status: CANCELLED | OUTPATIENT
Start: 2020-04-28

## 2020-04-28 RX ORDER — MEPERIDINE HYDROCHLORIDE 25 MG/ML
25 INJECTION INTRAMUSCULAR; INTRAVENOUS; SUBCUTANEOUS EVERY 30 MIN PRN
Status: CANCELLED | OUTPATIENT
Start: 2020-04-28

## 2020-04-28 RX ORDER — ALBUTEROL SULFATE 0.83 MG/ML
2.5 SOLUTION RESPIRATORY (INHALATION)
Status: CANCELLED | OUTPATIENT
Start: 2020-04-28

## 2020-04-28 RX ORDER — ALBUTEROL SULFATE 90 UG/1
1-2 AEROSOL, METERED RESPIRATORY (INHALATION)
Status: CANCELLED
Start: 2020-04-28

## 2020-04-28 RX ORDER — EPINEPHRINE 1 MG/ML
0.3 INJECTION, SOLUTION, CONCENTRATE INTRAVENOUS EVERY 5 MIN PRN
Status: CANCELLED | OUTPATIENT
Start: 2020-04-28

## 2020-04-28 RX ORDER — SODIUM CHLORIDE 9 MG/ML
1000 INJECTION, SOLUTION INTRAVENOUS CONTINUOUS PRN
Status: CANCELLED
Start: 2020-04-28

## 2020-04-28 RX ORDER — DIPHENHYDRAMINE HYDROCHLORIDE 50 MG/ML
50 INJECTION INTRAMUSCULAR; INTRAVENOUS
Status: CANCELLED
Start: 2020-04-28

## 2020-04-28 NOTE — PROGRESS NOTES
"MEDICAL ONCOLOGY VIRTUAL VISIT    Suzy Rodríguez is a 34 year old female who is being evaluated via a billable video visit.      The patient has been notified of following:     \"This video visit will be conducted via a call between you and your physician/provider. We have found that certain health care needs can be provided without the need for an in-person physical exam.  This service lets us provide the care you need with a video conversation.  If a prescription is necessary we can send it directly to your pharmacy.  If lab work is needed we can place an order for that and you can then stop by our lab to have the test done at a later time.    Video visits are billed at different rates depending on your insurance coverage.  Please reach out to your insurance provider with any questions.    If during the course of the call the physician/provider feels a video visit is not appropriate, you will not be charged for this service.\"    Patient has given verbal consent for Video visit? Yes    How would you like to obtain your AVS? TestifCandia    Patient would like the video invitation sent by: Text to cell phone: [phone]    Will anyone else be joining your video visit? No       Sulma Altamirano [facility]    PHYSICIAN NOTE    VIDEO VISIT DUE TO COVID-19  DATE OF VISIT: April 29, 2020    Reason for visit: Follow-up for adrenocortical carcinoma, currently on adjuvant mitotane.    History of present illness: Ms. 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     Suzy has been stable since the last visit. She had developed significant, severe fatigue, slurred speech, muscle pain, insomnia, and tremors since being on the higher dose of mitotane 1500mg AM - 1500mg afternoon and 1000mg HS. These has improved quite a bit since dropping the dose to 1000mg BID and giving a brief treatment interruption. She's " "been on mitotane 1500mg QAM+1000 QPM on odd days alternating with 1000mg BID on even days for an average daily dose of 2250mg x 4 weeks. Also on hydrocortisone 30mg QAM, 20mg early PM.      She reports that she has been tolerating current dose of mitotane without recurrence of any previous side effects above except mild occasional slurred speech. No more nausea with mitotane so she stopped taking compazine.    Denies any fever, diarrhea, dizziness, Wt loss.     Last visit (virtual) with Dr. Hilton at [facility] was on 3/24/20 and mitotane level came back at ~10. She also follows with Dr. Veena Jaquez in our endocrinology department. No clinical evidence of disease recurrence.     Oncologic history:  1. 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   - CT abdomen and pelvis stone protocol without contrast 5 6633 showed \"9.2 x 8 cm heterogeneous right upper quadrant mass with small foci of calcification within it which is likely arising from the adrenal gland though inseparable from liver and upper pole of right kidney. It is incompletely evaluated on this noncontrast study. 3 x 2.6 cm mass right lobe of liver on image #85 also incompletely evaluated. 6 x 4 x 4 mm upper third left ureteral obstructing stone with mild to moderate secondary hydronephrosis. Collection of stones at lower pole left kidney extending over an area of approximately 6 x 7 x 4 mm. Additional nonobstructing 1 mm stone upper pole left kidney. 2 mm nonobstructing stone noted upper pole right kidney with no hydronephrosis on the right. Postop change consistent with gastric bypass. Noncontrast images of spleen, left adrenal gland, gallbladder, and pancreas unremarkable. No aneurysm. No adenopathy.\"  - Seen by Dr. Delvalle at [facility]. Referred to Dr. Alvino Patel and then  " Warren Mansfield.  - Endocrinology team directed workup showed high DHEAS level of 740 pre-surgery.   - Right open adrenalectomy and hepatic mobilization performed by Dr. Warren Mansfield on 6/25/2018. Pathology showed adrenocortical carcinoma measuring 11.3 cm, weighing 413 g with extension into or through the adrenal capsule negative lymphovascular invasion, tumor necrosis present, margins involved by tumor, no regional lymph nodes identified, pathologic stage T2 NX.  pathology review reveals low grade ACC.  - DHEAS normalized postsurgery.   - Adjuvant Mitotane started on 7/24/18. Dose increased to 2000mg TID around 10/23/18 due to persistently low troughs. Held 1/16/19 for two weeks and resumed 1/30 at 1000 mg po BID due to very poor tolerance of higher doses. Highest mitotane level was 10.2 on 2/11/19.  - Saw radiation oncology at [facility], radiation oncology at [facility], as well as endocrine oncology (Dr. Huertas) at [facility].   - S/p adjuvant RT by Dr. Monsivais - 5040 cGy over 30 fr (8/24/18-10/6/18).  - 2/11/19: OSH CT C/A/P and MRI brain with contrast - no evidence of disease recurrence.     Laboratory Data:   Labs from [facility] from 4/28/20 reviewed with pt. CBC, chem 7, LFTs within acceptable range. Mitotane and DHEAS pending. TSH low but FT4 WNL. Prior labs as under.  Lab Test 03/30/20  1120 02/26/20  1132 01/22/20  1647   WBC 5.6 7.8 3.7*   RBC 4.59 4.45 4.40   HGB 12.8 11.4* 10.8*   HCT 40.8 37.4 35.7   MCV 89 84 81   MCH 27.9 25.6* 24.5*   MCHC 31.4* 30.5* 30.3*   RDW 17.1* 17.6* 18.2*    205 190   NEUTROPHIL 79.9 74.9 75.4    137 139   POTASSIUM 3.0* 3.4 4.4   CHLORIDE 102 104 106   CO2 32 27 29   ANIONGAP 4 6 4   * 83 108*   BUN 6* 12 10   CR 0.66 0.59 0.69   SHASHA 7.8* 7.8* 7.8*   PROTTOTAL 6.1* 5.7* 5.5*   ALBUMIN 3.0* 2.8* 2.7*   BILITOTAL 0.3 0.2 0.2   ALKPHOS 88 89 90   AST 17 18 14   ALT 18 19 20     Lab Test 03/30/20  1120  02/26/20  1132 01/22/20  1647 12/12/19  1715 11/05/19  1349   TSH 0.37* 0.48 0.27* 0.09* 0.11*   T4 1.06 1.08 0.87 0.95 0.98     Lab Test 03/30/20  1120 02/26/20  1132   CHOL 185 157    112   LDL 38 24   TRIG 173* 108     Assessment/Plan: A 34-year-old lady with right-sided functioning adrenocortical carcinoma.     Adrenocortical carcinoma:  - Started on adjuvant mitotane in July 2018 based on her presentation and tumor characteristics including invasion of the adrenal capsule, high mitotic rate, possibly positive margins, and high Ki67, which could result in a rate of recurrence as high as 40%.    - Restaging CT C/A/P 2/25/20 showed no evidence of disease recurrence. Previously noted 2mm nodule has not grown.  - Despite multiple supportive care interventions, patient has NOT been able to tolerate higher doses (above 2-3gm/day) of mitotane.   - Continue mitotane 1500mg QAM+1000 QPM on odd days alternating with 1000mg BID on even days for an average daily dose of 2250mg. May increase mitotane dose to 1500mg QAM+1000mg QPM on all days based on pending blood level.  - Monthly Mitotane level, CBC, comprehensive panel, TSH, FT4, and DHEAS, and periodic cholesterol panel, 24-hour Urine Free Cortisol.   - Plan to continue adjuvant Mitotane for up to 5 yrs if tolerated.  - Next restaging CT C/A/P in late May - early June.     Hypocalcemia, mild:  - Advised to take 2 calcium tablets daily (1000 mg every day).  - Continue to monitor low albumin. She states she is trying a new diet but if this remains low, will refer to dietician.    Endocrinopathies:  - Mgmt per endocrine team at the . Follow-up planned in the next couple months.    Insomnia:  - Trazodone is working well but she's taking 150mg at bedtime. Further mgmt per PCP.   - EKG with QTc 488 ms in April 2019. Will repeat PRN.      Return to see Dr. Cintron in early June (virtual visit) with labs and repeat CT C/A/P.     The patient was seen and care plans discussed  with Dr. Cintron.    Se sophia Campos MD  [facility]  Hematology oncology and transplantation fellow  Pager [phone]    ATTENDING ATTESTATION NOTE:  I saw the patient with Dr. Campos, the onc fellow and discussed all pertinent clinical data including lab, imaging and path reports. The plan above was made under my supervision. I've edited the note to accurately reflect the A/P after my examination and discussion with the patient.     BILLIN. 30 mins video visit; complex medical decision making     Benson Cintron M.D.  Mary[employer]. Professor of Medicine  Genitourinary Oncology  Division of Hematology, Oncology & Transplantation  [facility]

## 2020-04-29 ENCOUNTER — VIRTUAL VISIT (OUTPATIENT)
Dept: ONCOLOGY | Facility: CLINIC | Age: 35
End: 2020-04-29
Attending: INTERNAL MEDICINE
Payer: COMMERCIAL

## 2020-04-29 DIAGNOSIS — C74.01 MALIGNANT NEOPLASM OF CORTEX OF RIGHT ADRENAL GLAND (H): Primary | ICD-10-CM

## 2020-04-29 DIAGNOSIS — J90 PLEURAL EFFUSION ON RIGHT: ICD-10-CM

## 2020-04-29 LAB — T4 FREE SERPL-MCNC: 1 NG/DL (ref 0.7–1.8)

## 2020-04-29 PROCEDURE — 99214 OFFICE O/P EST MOD 30 MIN: CPT | Mod: 95 | Performed by: INTERNAL MEDICINE

## 2020-04-29 PROCEDURE — 40000114 ZZH STATISTIC NO CHARGE CLINIC VISIT

## 2020-04-30 LAB — DHEA-S SERPL-MCNC: 1 UG/DL (ref 45–270)

## 2020-05-04 ENCOUNTER — COMMUNICATION - HEALTHEAST (OUTPATIENT)
Dept: BEHAVIORAL HEALTH | Facility: HOSPITAL | Age: 35
End: 2020-05-04

## 2020-05-05 ENCOUNTER — OFFICE VISIT - HEALTHEAST (OUTPATIENT)
Dept: BEHAVIORAL HEALTH | Facility: CLINIC | Age: 35
End: 2020-05-05

## 2020-05-05 ENCOUNTER — COMMUNICATION - HEALTHEAST (OUTPATIENT)
Dept: BEHAVIORAL HEALTH | Facility: CLINIC | Age: 35
End: 2020-05-05

## 2020-05-05 DIAGNOSIS — F41.1 GENERALIZED ANXIETY DISORDER: ICD-10-CM

## 2020-05-05 DIAGNOSIS — F19.982 DRUG-INDUCED INSOMNIA (H): ICD-10-CM

## 2020-05-05 DIAGNOSIS — F43.23 ADJUSTMENT REACTION WITH ANXIETY AND DEPRESSION: ICD-10-CM

## 2020-05-05 DIAGNOSIS — Z63.0 MARITAL RELATIONSHIP PROBLEM: ICD-10-CM

## 2020-05-05 DIAGNOSIS — F33.9 MAJOR DEPRESSIVE DISORDER, RECURRENT EPISODE (H): ICD-10-CM

## 2020-05-05 SDOH — SOCIAL STABILITY - SOCIAL INSECURITY: PROBLEMS IN RELATIONSHIP WITH SPOUSE OR PARTNER: Z63.0

## 2020-05-07 ENCOUNTER — MYC MEDICAL ADVICE (OUTPATIENT)
Dept: ONCOLOGY | Facility: CLINIC | Age: 35
End: 2020-05-07

## 2020-05-07 DIAGNOSIS — C74.01 MALIGNANT NEOPLASM OF CORTEX OF RIGHT ADRENAL GLAND (H): Primary | ICD-10-CM

## 2020-05-07 LAB
ARUP MISCELLANEOUS TEST: NORMAL
MISCELLANEOUS TEST DEPT. - HE HISTORICAL: NORMAL
PERFORMING LAB: NORMAL
SPECIMEN STATUS: NORMAL
TEST NAME: NORMAL

## 2020-05-08 ENCOUNTER — COMMUNICATION - HEALTHEAST (OUTPATIENT)
Dept: FAMILY MEDICINE | Facility: CLINIC | Age: 35
End: 2020-05-08

## 2020-05-08 DIAGNOSIS — F41.9 ANXIETY: ICD-10-CM

## 2020-05-13 DIAGNOSIS — C74.01 MALIGNANT NEOPLASM OF CORTEX OF RIGHT ADRENAL GLAND (H): ICD-10-CM

## 2020-05-13 NOTE — NURSING NOTE
Chief Complaint   Patient presents with     Blood Draw     VPT blood draw only from right arm

## 2020-05-14 DIAGNOSIS — C74.01 MALIGNANT NEOPLASM OF CORTEX OF RIGHT ADRENAL GLAND (H): ICD-10-CM

## 2020-05-14 LAB — MISCELLANEOUS TEST: NORMAL

## 2020-05-14 PROCEDURE — 80375 DRUG/SUBSTANCE NOS 1-3: CPT | Performed by: INTERNAL MEDICINE

## 2020-05-14 PROCEDURE — 84999 UNLISTED CHEMISTRY PROCEDURE: CPT | Performed by: INTERNAL MEDICINE

## 2020-05-14 NOTE — PROGRESS NOTES
Chief Complaint   Patient presents with     Blood Draw     Labs collected via butterfly     Labs collected via 23 gauge butterfly.     Hector Orta RN

## 2020-05-15 ENCOUNTER — HOSPITAL ENCOUNTER (OUTPATIENT)
Dept: BEHAVIORAL HEALTH | Facility: CLINIC | Age: 35
Discharge: HOME | End: 2020-05-15
Attending: PSYCHIATRY & NEUROLOGY | Admitting: PSYCHIATRY & NEUROLOGY
Payer: COMMERCIAL

## 2020-05-15 PROCEDURE — 90791 PSYCH DIAGNOSTIC EVALUATION: CPT | Mod: GT | Performed by: PSYCHOLOGIST

## 2020-05-15 ASSESSMENT — COLUMBIA-SUICIDE SEVERITY RATING SCALE - C-SSRS
5. HAVE YOU STARTED TO WORK OUT OR WORKED OUT THE DETAILS OF HOW TO KILL YOURSELF? DO YOU INTEND TO CARRY OUT THIS PLAN?: NO
ATTEMPT LIFETIME: YES
6. HAVE YOU EVER DONE ANYTHING, STARTED TO DO ANYTHING, OR PREPARED TO DO ANYTHING TO END YOUR LIFE?: NO
REASONS FOR IDEATION PAST MONTH: MOSTLY TO END OR STOP THE PAIN (YOU COULDN'T GO ON LIVING WITH THE PAIN OR HOW YOU WERE FEELING)
LETHALITY/MEDICAL DAMAGE CODE MOST RECENT POTENTIAL ATTEMPT: BEHAVIOR NOT LIKELY TO RESULT IN INJURY
4. HAVE YOU HAD THESE THOUGHTS AND HAD SOME INTENTION OF ACTING ON THEM?: NO
3. HAVE YOU BEEN THINKING ABOUT HOW YOU MIGHT KILL YOURSELF?: CUTTING WRISTS
ATTEMPT PAST THREE MONTHS: NO
TOTAL  NUMBER OF ABORTED OR SELF INTERRUPTED ATTEMPTS PAST LIFETIME: YES
2. HAVE YOU ACTUALLY HAD ANY THOUGHTS OF KILLING YOURSELF LIFETIME?: YES
6. HAVE YOU EVER DONE ANYTHING, STARTED TO DO ANYTHING, OR PREPARED TO DO ANYTHING TO END YOUR LIFE?: NO
1. IN THE PAST MONTH, HAVE YOU WISHED YOU WERE DEAD OR WISHED YOU COULD GO TO SLEEP AND NOT WAKE UP?: YES
TOTAL  NUMBER OF ACTUAL ATTEMPTS LIFETIME: 1
TOTAL  NUMBER OF INTERRUPTED ATTEMPTS LIFETIME: NO
4. HAVE YOU HAD THESE THOUGHTS AND HAD SOME INTENTION OF ACTING ON THEM?: NO
TOTAL  NUMBER OF ABORTED OR SELF INTERRUPTED ATTEMPTS LIFETIME: 1
5. HAVE YOU STARTED TO WORK OUT OR WORKED OUT THE DETAILS OF HOW TO KILL YOURSELF? DO YOU INTEND TO CARRY OUT THIS PLAN?: NO
REASONS FOR IDEATION LIFETIME: EQUALLY TO GET ATTENTION, REVENGE OR A REACTION FROM OTHERS AND TO END/STOP THE PAIN
TOTAL  NUMBER OF ABORTED OR SELF INTERRUPTED ATTEMPTS PAST 3 MONTHS: NO
1. IN THE PAST MONTH, HAVE YOU WISHED YOU WERE DEAD OR WISHED YOU COULD GO TO SLEEP AND NOT WAKE UP?: YES
2. HAVE YOU ACTUALLY HAD ANY THOUGHTS OF KILLING YOURSELF?: NO
LETHALITY/MEDICAL DAMAGE CODE MOST RECENT ACTUAL ATTEMPT: NO PHYSICAL DAMAGE OR VERY MINOR PHYSICAL DAMAGE
3. HAVE YOU BEEN THINKING ABOUT HOW YOU MIGHT KILL YOURSELF?: YES
TOTAL  NUMBER OF INTERRUPTED ATTEMPTS PAST 3 MONTHS: NO

## 2020-05-15 ASSESSMENT — PATIENT HEALTH QUESTIONNAIRE - PHQ9
SUM OF ALL RESPONSES TO PHQ QUESTIONS 1-9: 22
5. POOR APPETITE OR OVEREATING: SEVERAL DAYS

## 2020-05-15 ASSESSMENT — ANXIETY QUESTIONNAIRES
5. BEING SO RESTLESS THAT IT IS HARD TO SIT STILL: NEARLY EVERY DAY
3. WORRYING TOO MUCH ABOUT DIFFERENT THINGS: NOT AT ALL
IF YOU CHECKED OFF ANY PROBLEMS ON THIS QUESTIONNAIRE, HOW DIFFICULT HAVE THESE PROBLEMS MADE IT FOR YOU TO DO YOUR WORK, TAKE CARE OF THINGS AT HOME, OR GET ALONG WITH OTHER PEOPLE: VERY DIFFICULT
1. FEELING NERVOUS, ANXIOUS, OR ON EDGE: NEARLY EVERY DAY
6. BECOMING EASILY ANNOYED OR IRRITABLE: NEARLY EVERY DAY
7. FEELING AFRAID AS IF SOMETHING AWFUL MIGHT HAPPEN: NEARLY EVERY DAY
GAD7 TOTAL SCORE: 16
2. NOT BEING ABLE TO STOP OR CONTROL WORRYING: NEARLY EVERY DAY
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
## 2020-05-16 ASSESSMENT — ANXIETY QUESTIONNAIRES: GAD7 TOTAL SCORE: 16

## 2020-05-18 ENCOUNTER — BEH TREATMENT PLAN (OUTPATIENT)
Dept: BEHAVIORAL HEALTH | Facility: CLINIC | Age: 35
End: 2020-05-18
Attending: PSYCHIATRY & NEUROLOGY

## 2020-05-18 ENCOUNTER — TELEPHONE (OUTPATIENT)
Dept: BEHAVIORAL HEALTH | Facility: CLINIC | Age: 35
End: 2020-05-18

## 2020-05-18 DIAGNOSIS — F33.1 MAJOR DEPRESSIVE DISORDER, RECURRENT EPISODE, MODERATE WITH ANXIOUS DISTRESS (H): ICD-10-CM

## 2020-05-18 ASSESSMENT — ANXIETY QUESTIONNAIRES
2. NOT BEING ABLE TO STOP OR CONTROL WORRYING: NEARLY EVERY DAY
IF YOU CHECKED OFF ANY PROBLEMS ON THIS QUESTIONNAIRE, HOW DIFFICULT HAVE THESE PROBLEMS MADE IT FOR YOU TO DO YOUR WORK, TAKE CARE OF THINGS AT HOME, OR GET ALONG WITH OTHER PEOPLE: SOMEWHAT DIFFICULT
1. FEELING NERVOUS, ANXIOUS, OR ON EDGE: NEARLY EVERY DAY
6. BECOMING EASILY ANNOYED OR IRRITABLE: MORE THAN HALF THE DAYS
3. WORRYING TOO MUCH ABOUT DIFFERENT THINGS: NOT AT ALL
7. FEELING AFRAID AS IF SOMETHING AWFUL MIGHT HAPPEN: NEARLY EVERY DAY
5. BEING SO RESTLESS THAT IT IS HARD TO SIT STILL: NOT AT ALL
GAD7 TOTAL SCORE: 11

## 2020-05-18 ASSESSMENT — PATIENT HEALTH QUESTIONNAIRE - PHQ9
5. POOR APPETITE OR OVEREATING: NOT AT ALL
SUM OF ALL RESPONSES TO PHQ QUESTIONS 1-9: 20

## 2020-05-18 NOTE — PROGRESS NOTES
Admission Date: 5/18/2020    Identify any current concerns with potential impact to admission:     medication/medical concerns: has cancer - in treatment currently and may have some appointments impacting treatment once per month     immediate safety concerns:none    Does patient have safety plan? yes  Note: Please copy safety plan copied into BEH Encounter     Other (insurance/childcare/transportation/housing/planned absences/etc): may have some appointments related to her cancer treatment    Patient's insurance is: preferred one . Does patient need appointment with provider? no    If patient has Medical Assistance (MA) is LOCUS and Functional Assessment completed? Not applicable    If patient is in Partial Hospitalization Program is LOCUS completed? Not applicable                                                                                 Completed by: YSABEL Francisco

## 2020-05-18 NOTE — TELEPHONE ENCOUNTER
Writer completed Pt's admission today.  She will be set to start the program tomorrow morning.  She has no safety concerns.  She does identify her cancer diagnosis as possibly getting in the way of treatment - but only minimally.  Text invite is preferred invite method.

## 2020-05-18 NOTE — PROGRESS NOTES
"Outpatient Mental Health Services - Adult     MY COPING PLAN FOR SAFETY     PATIENT'S NAME:           Suzy Rodríguez  MRN:                                  [id number]  SAFETY PLAN:  Step 1: Warning signs / cues (Thoughts, images, mood, situation, behavior) that a crisis may be developing:  ? Thoughts: \"People would be better off without me\"  ? Thinking Processes: ruminations (can't stop thinking about my problems): think about dying from cancer  ? Mood: worsening depression, hopelessness and helplessness  ? Behaviors: isolating/withdrawing  and not taking care of myself  ? Situations: relationship problems, medical condition / diagnosis: cancer and stress of kids home from school   Step 2: Coping strategies - Things I can do to take my mind off of my problems without contacting another person (relaxation technique, physical activity):  ? Distress Tolerance Strategies:  needs to identify and reingage with hobbies  ? Physical Activities: bike ride  ? Focus on helpful thoughts:  none current  Step 3: People  that provide distraction:                 Name: sister         Step 4: Remind myself of people and things that are important to me and worth living for:  family  Step 5: When I am in crisis, I can ask these people to help me use my safety plan:                 Name:  SisterAshvin--      Step 6: Making the environment safe:   ? be around others  Step 7: Professionals or agencies I can contact during a crisis:  ? Suicide Prevention Lifeline: 7-888-271-TALK (1351)  ? Crisis Text Line Service (available 24 hours a day, 7 days a week): Text [place] to 069025  ? Call  **CRISIS (781818) from a cell phone to talk to a team of professionals who can help you.       Crisis Services By [facility]: Phone Number:   [place]     [phone]   [place]    [phone]   [place]    [phone]   [place]    [phone]   [place]    [phone]   [place] [phone]   [place]     [phone]      ? Call 395 or go to my " [facility] emergency department.              I helped develop this safety plan and agree to use it when needed.  I have been given a copy of this plan.       Client signature _________________________________________________________________  Today s date:  5/15/2020  Adapted from Safety Plan Template 2008 Jeanne Dupont and Jordan Lynne is reprinted with the express permission of the authors.  No portion of the Safety Plan Template may be reproduced without the express, written permission.  You can contact the authors at [email or web address] or [email or web address].

## 2020-05-18 NOTE — TELEPHONE ENCOUNTER
received a message that Pt had called the Intake line today asking about her start date.  She is scheduled to start in the program tomorrow.  Kathyr attempted to call her back, but she did not answer the phone.   left a call back number - hoping to schedule a telephone call today to complete admission work prior to tomorrow morning.

## 2020-05-19 ENCOUNTER — HOSPITAL ENCOUNTER (OUTPATIENT)
Dept: BEHAVIORAL HEALTH | Facility: CLINIC | Age: 35
End: 2020-05-19
Attending: PSYCHIATRY & NEUROLOGY
Payer: COMMERCIAL

## 2020-05-19 ENCOUNTER — TELEPHONE (OUTPATIENT)
Dept: BEHAVIORAL HEALTH | Facility: CLINIC | Age: 35
End: 2020-05-19

## 2020-05-19 PROBLEM — F33.1 MAJOR DEPRESSIVE DISORDER, RECURRENT EPISODE, MODERATE WITH ANXIOUS DISTRESS (H): Status: ACTIVE | Noted: 2020-05-19

## 2020-05-19 PROCEDURE — G0177 OPPS/PHP; TRAIN & EDUC SERV: HCPCS | Mod: 95

## 2020-05-19 PROCEDURE — 90853 GROUP PSYCHOTHERAPY: CPT | Mod: TEL | Performed by: SOCIAL WORKER

## 2020-05-19 ASSESSMENT — ANXIETY QUESTIONNAIRES: GAD7 TOTAL SCORE: 11

## 2020-05-19 NOTE — TELEPHONE ENCOUNTER
Called patient to complete RN Physical Health Admission Screening Questions.    Yee Wong RN on 5/19/2020 at 3:40 PM

## 2020-05-19 NOTE — GROUP NOTE
Psychoeducation Group Note    PATIENT'S NAME: Suzy Rodríguez  MRN:   [id number]  :   1985  ACCT. NUMBER: [id number]  DATE OF SERVICE: 20  START TIME: 10:00 AM  END TIME: 10:50 AM  FACILITATOR: Ap Montanez OTR/L  TOPIC: MH Life Skills Group: Communication and Social Skills Development  Telemedicine Visit: The patient's condition can be safely assessed and treated via synchronous audio and visual telemedicine encounter.      Reason for Telemedicine Visit:  COVID-19    Originating Site (Patient Location): Patient's home    Distant Site (Provider Location): [facility]: [facility]    Consent:  The patient/guardian has verbally consented to: the potential risks and benefits of telemedicine (video visit) versus in person care; bill my insurance or make self-payment for services provided; and responsibility for payment of non-covered services.     Mode of Communication:  Video Conference via [employer]    As the provider I attest to compliance with applicable laws and regulations related to telemedicine.   Adult Mental Health Day Treatment  TRACK: 5A    NUMBER OF PARTICIPANTS: 6    Summary of Group / Topics Discussed:  Communication and Social Skills Development: Listening Skills: {Patients were taught and gained awareness into personal barriers to effective listening and how this can negatively impact relationships.  Patients were taught skills and practiced how to improve communication with others by becoming an active listener.  Patients identified both personal strengths and opportunities for growth in this area to improve overall communication and connection with other people as a way to build meaningful relationships to combat mental health and substance use/abuse symptoms.      Patient Session Goals / Objectives:    Identified importance of active listening skills in effective communication and how this impacts their ability to communicate clearly with other people        Improved awareness of important aspects of listening skills and how this relates to mental health recovery        Established a plan for practice of these skills in their own environments    Practiced and reflected on how to generalize taught skills to their everyday life        Patient Participation / Response:  Fully participated with the group by sharing personal reflections / insights and openly received / provided feedback with other participants.    Patient Presentation: Calm,alert,focused with stable mood and thought process.    Treatment Plan:  Patient has a current master individualized treatment plan.  See Epic treatment plan for more information.    Ap Montanez, OTR/L

## 2020-05-19 NOTE — PROGRESS NOTES
"Adult Mental Health Day Treatment  TRACK: 5A  The patient has been notified of the following:      \"We have found that certain health care needs can be provided without the need for a face to face visit.  This service lets us provide the care you need with a phone conversation.       I will have full access to your [place] medical record during this entire phone call.   I will be taking notes for your medical record.      Since this is like an office visit, we will bill your insurance company for this service.       There are potential benefits and risks of telephone visits (e.g. limits to patient confidentiality) that differ from in-person visits.?  Confidentiality still applies for telephone services, and nobody will record the visit.  It is important to be in a quiet, private space that is free of distractions (including cell phone or other devices) during the visit.??      If during the course of the call I believe a telephone visit is not appropriate, you will not be charged for this service\"     Consent has been obtained for this service by care team member: Yes     PATIENT'S NAME: Suzy Rodríguez  MRN:   [id number]  :   1985  ACCT. NUMBER: [id number]  DATE OF SERVICE: 20  START TIME: 1000  END TIME: 1050    NUMBER OF PARTICIPANTS: 6      Summary of Group / Topics Discussed:  Communication and Social Skills Development: Listening Skills: Patients were taught and gained awareness into personal barriers to effective listening and how this can negatively impact relationships.  Patients were taught skills and practiced how to improve communication with others by becoming an active listener.  Patients identified both personal strengths and opportunities for growth in this area to improve overall communication and connection with other people as a way to build meaningful relationships to combat mental health and substance use/abuse symptoms.      Patient Session Goals / Objectives:    Identified " importance of active listening skills in effective communication and how this impacts their ability to communicate clearly with other people       Improved awareness of important aspects of listening skills and how this relates to mental health recovery        Established a plan for practice of these skills in their own environments    Practiced and reflected on how to generalize taught skills to their everyday life    Patient Participation / Response:  Fully participated with the group by sharing personal reflections / insights and openly received / provided feedback with other participants.    Patient Presentation: Calm,alert,focused with stable mood and thought process.    Treatment Plan:  Patient has a current master individualized treatment plan.  See Epic treatment plan for more information.

## 2020-05-19 NOTE — GROUP NOTE
Psychotherapy Group Note    PATIENT'S NAME: Suzy Rodríguez  MRN:   [id number]  :   1985  [id number]  DATE OF SERVICE: 20  START TIME: 10:00 AM  END TIME: 10:50 AM  FACILITATOR: Sulma Arshad LICSW  TOPIC:  EBP Group: Behavioral Activation  Adult Mental Health Day Treatment  TRACK: 5A    NUMBER OF PARTICIPANTS: 5    Summary of Group / Topics Discussed:  Behavioral Activation: Motivation and Procrastination: Patients explored how they currently spend their time, identifying thoughts and feelings that are motivating and serve to increase desired behaviors.  They also examined behaviors that contribute to procrastination.  Different types of procrastination behaviors were identified, and strategies to reduce individual procrastination and increase motivation were explored and practiced.  Patients identified ways to increase goal-directed activities to enhance mood and reduce symptoms.        Patient Session Goals / Objectives:    Identify current patterns of procrastination behavior and how they influence thoughts and moods, and inhibit motivation.    Identify behaviors that can be implemented that contribute to improving thoughts and feelings, motivation, and reduce symptoms.    Identify and develop a plan to increase activities that promote a sense of accomplishment and competence.    Practice scheduling positive activities / behaviors into daily routines.    Telemedicine Visit: The patient's condition can be safely assessed and treated via synchronous audio and visual telemedicine encounter.      Reason for Telemedicine Visit: Services only offered telehealth    Originating Site (Patient Location): Patient's home    Distant Site (Provider Location): Provider Remote Setting    Consent:  The patient/guardian has verbally consented to: the potential risks and benefits of telemedicine (video visit) versus in person care; bill my insurance or make self-payment for services  provided; and responsibility for payment of non-covered services.     Mode of Communication:  Video Conference via [employer]    As the provider I attest to compliance with applicable laws and regulations related to telemedicine.       Patient Participation / Response:  Fully participated with the group by sharing personal reflections / insights and openly received / provided feedback with other participants.    Shared experiences and challenges with making behavioral changes and Identified barriers to change    Treatment Plan:  Patient has an initial individualized treatment plan that was created as part of their diagnostic assessment / admission process.  A master individualized treatment plan is in the process of being developed with the patient and multi-disciplinary care team.    Sulma Arshad, TIMSW

## 2020-05-19 NOTE — GROUP NOTE
"Psychoeducation Group Note    PATIENT'S NAME: Suzy Rodríguez  MRN:   [id number]  :   1985  ACCT. NUMBER: [id number]  DATE OF SERVICE: 20  START TIME: 10:00 AM  END TIME: 10:50 AM  FACILITATOR: Ap Montanez OTR/L  TOPIC: MH Life Skills Group: Communication and Social Skills Development  The patient has been notified of the following:      \"We have found that certain health care needs can be provided without the need for a face to face visit.  This service lets us provide the care you need with a phone conversation.       I will have full access to your [place] medical record during this entire phone call.   I will be taking notes for your medical record.      Since this is like an office visit, we will bill your insurance company for this service.       There are potential benefits and risks of telephone visits (e.g. limits to patient confidentiality) that differ from in-person visits.?  Confidentiality still applies for telephone services, and nobody will record the visit.  It is important to be in a quiet, private space that is free of distractions (including cell phone or other devices) during the visit.??      If during the course of the call I believe a telephone visit is not appropriate, you will not be charged for this service\"     Consent has been obtained for this service by care team member: Yes      Adult Mental Health Day Treatment  TRACK: 5A    NUMBER OF PARTICIPANTS: 6    Summary of Group / Topics Discussed:  Communication and Social Skills Development: Listening Skills: {Patients were taught and gained awareness into personal barriers to effective listening and how this can negatively impact relationships.  Patients were taught skills and practiced how to improve communication with others by becoming an active listener.  Patients identified both personal strengths and opportunities for growth in this area to improve overall communication and connection with other people as a way " to build meaningful relationships to combat mental health and substance use/abuse symptoms.      Patient Session Goals / Objectives:    Identified importance of active listening skills in effective communication and how this impacts their ability to communicate clearly with other people       Improved awareness of important aspects of listening skills and how this relates to mental health recovery        Established a plan for practice of these skills in their own environments    Practiced and reflected on how to generalize taught skills to their everyday life        Patient Participation / Response:  Fully participated with the group by sharing personal reflections / insights and openly received / provided feedback with other participants.    Patient Presentation: Calm,alert,focused with stable mood and thought process.    Treatment Plan:  Patient has a current master individualized treatment plan.  See Epic treatment plan for more information.    Ap Montanez, OTR/L

## 2020-05-19 NOTE — PROGRESS NOTES
"RN Review of Medical History / Physical Health Screen  Outpatient Mental Health Programs - Adult    Adult Mental Health Day Treatment    PATIENT'S NAME: Suzy Rodríguez  MRN:   [id number]  :   1985  ACCT. NUMBER: [id number]  CURRENT AGE:  34 year old    DATE OF DIAGNOSTIC ASSESSMENT: 05/15/2020  DATE OF ADMISSION: 2020     Please see Diagnostic Assessment for additional Medical History.     General Health:   Have you had any exposure to any communicable disease in the past 2-3 weeks? no     Are you aware of safe sex practices? yes       Nutrition:    Are you on a special diet? If yes, please explain:  no   Do you have any concerns regarding your nutritional status? If yes, please explain:  no   Have you had any appetite changes in the last 3 months?  No     Have you had any weight loss or weight gain in the last 3 months?  No     Do you have a history of an eating disorder? no   Do you have a history of being in an eating disorder program? no     Patient height and weight recorded by RN in epic flowsheet: No; Unable to measure    Because of temporary in-person programmatic suspension due to COVID-19 pandemic, all pt weights and heights will be collected through patient self-report an recorded in physical health screening progress note upon admission to the program.    Height/Weight Review:  Patient reported height:  5'4\"      Patient reports weight:  Date last checked:  145 pounds   Any referrals/needs identified?  None indicated at this time. Patient denies concerns at this time.        Fall Risk:   Have you had any falls in the past 3 months? no     Do you currently use any assistive devices for mobility?   no      Additional Comments/Assessment: None indicated at this time    Per completion of the Medical History / Physical Health Screen, is there a recommendation to see / follow up with a primary care physician/clinic or dentist?    No.      Yee Wong RN  2020        "

## 2020-05-20 ENCOUNTER — COMMUNICATION - HEALTHEAST (OUTPATIENT)
Dept: FAMILY MEDICINE | Facility: CLINIC | Age: 35
End: 2020-05-20

## 2020-05-20 DIAGNOSIS — F33.2 SEVERE EPISODE OF RECURRENT MAJOR DEPRESSIVE DISORDER, WITHOUT PSYCHOTIC FEATURES (H): ICD-10-CM

## 2020-05-20 LAB — LAB SCANNED RESULT: NORMAL

## 2020-05-20 NOTE — PROGRESS NOTES
Adult Day Treatment Program:  Individualized Treatment Plan     Date of Plan: 20    Name: Suzy Rodríguez MRN: [id number]    : 1985    Programs:  Adult Day Treatment (ADT)     Clinical Track (if applicable):  5A    DSM5 Diagnosis   296.32 (F33.1) Major Depressive Disorder, Recurrent Episode, Moderate _ and With anxious distress  300.02 (F41.1) Generalized Anxiety Disorder    Adult Day Treatment Program Multidisciplinary Team Members: Jorge Johnson MD and/or Dr. Antoinette Morton; Sulma Arshad, [facility], Fransico Montanez, OTR/L, Sulma Metzger, [facility], BC-DMT, Genny Kelley RN, BSN    Suzy Rodríguez will participate in the Adult Day Treatment Program  3 days per week, 3 hours per day. Anticipated duration/discharge: 12 weeks    Due to COVID-19, the type of service modality, frequency, and duration of treatment is altered. Services will be delivered via telemedicine and/or telephone calls until able to resume in-person group modality.        Program Start Date: 20  Anticipated Discharge Date: 20 (pending authorization/clinical changes)    NOTE: Complete CGI     Review Date: Does Suzy Rodríguez continue to meet criteria to participate in the Adult Day Treatment Program, 3 days per week; 3 hours per day?   20 yes                       Client Strengths:  open to suggestions / feedback     Client Participation in Plan:  Contributed to goals and plan     Areas of Vulnerability:  Anxiety  Depressive symptoms     Long-Term Goals:  Knowledge about illness and management of symptoms   Maintenance of personal safety     Abuse Prevention Plan:  Safe, therapeutic environment   Safety coping plan as needed   Education regarding illness and skill development   Coordination with care providers     Discharge Criteria:  Satisfactory progress toward treatment goals   Improvement re: identified problems and symptoms   Ability to continue recovery at next level of service   Has a discharge plan in place    Has safety/coping plan in place      Areas of Treatment Focus        Area of Treatment Focus:   Personal Safety  Start Date:    5/28/20    Goal:  Target Date: 7/23/20 Status: Completed  Client will notify staff when needing assistance to develop or implement a coping plan to manage suicidal or self injurious urges.Use coping plan for safety, as needed.  5/26:  Iris does not have any safety concerns and says she never has had SI or SIB urges      Progress:   8/13/20:  No suicidal ideation at discharge.  Complete.        Treatment Strategies:   Assess / reassess level of potential for harm to self or others  Engage in safety planning when indicated  Facilitate increased self awareness          Area of Treatment Focus:   Symptom Stabilization and Management  Start Date:    5/28/20    Goal:  Target Date: 7/23/20 Status: Completed  When in life skills Suzy  will provide an update related to perceived progress with mental health recovery weekly.  5/26: Would like to get out of the house more. Currently getting out once every other week.  Start out goal to leave house once per week.  Willing to increase this frequency.     Progress:   8/13/20:  Goal Completed.        Treatment Strategies:   Facilitate increased self awareness  Provide education regarding strategies and coping skills to assist with mental health recovery          Area of Treatment Focus:   Wellness and Mental Health  Start Date:    5/28/20    Goal:  Target Date: 7/23/20 Status: Completed  Suzy will improve wellness related behaviors by getting enough sleep,exercise, balanced nutrition and take medications (if prescribed) to maintain good mental health.  5/26: would like to focus on sleep.  Balance sleep patterns.  Sleeping too much during the day and not at night.  Goal to work on this balance in the next two months.       Progress:    8/13/20:  Goal Completed.        Treatment Strategies:   Facilitate increased self awareness  Provide education  "regarding wellness habits and routines which help to improve mental health          Area of Treatment Focus:   Community Resources / Support and Discharge Planning  Start Date:    5/28/20    Goal:  Target Date: 7/23/20 Status: Completed  Client will establish an aftercare plan to include medical providers and social supports by discharge.  5/26: Therapist at [facility], Psychiatrist is Dr. Bonilla at [place].She does not feel she needs any other support.       Progress:    8/13/20:  Goal Completed.        Treatment Strategies:   Facilitate increased self awareness  Provide education regarding relapse prevention,discharge planning and mental health support          Area of Treatment Focus:   Symptom Stabilization and Management  Start Date:    5/28/20    Goal:  Target Date: 7/23/20 Status: Completed  Will learn and practice 1-2 coping skills to help improve depressive and anxiety symptoms.  She will also work on relationship concerns with family members. (mom and brother)  5/26: Will practice mindfullness and distress tolerance.       Progress:    8/13/20:  Goal Completed.        Treatment Strategies:   Teach adaptive coping skills and communication skills       Ap Montanez, OTR/L      NOTE: Required signatures are completed manually and scanned into the electronic medical record. See \"Media\" tab in epic.    The Individualized Treatment Plan Signature Page has been routed to the provider for co-sign.      "

## 2020-05-20 NOTE — DISCHARGE SUMMARY
Adult Mental Health Intensive Outpatient Discharge Summary/Instructions      Patient: Suzy Rodríguez MRN: [id number]   : 1985 Age: 34 year old Sex: female     Admission Date: 20  Discharge Date: 20  Diagnosis: 296.32 (F33.1) Major Depressive Disorder, Recurrent Episode, Moderate _ and With anxious distress  300.02 (F41.1) Generalized Anxiety Disorder    Focus of Treatment / Progress    Personal Safety: Suzy denied suicidal ideation at discharge.     * Follow your safety plan     * Call crisis lines as needed:    [facility] [phone] [facility] [phone]  [facility] [phone] Crisis Connection [phone]  [facility] [phone] [facility] [phone]  [facility] [phone] National Suicide Prevention [phone]  [facility] [phone] Suicide Prevention [phone]  [facility] [phone]    Managing symptoms of:  Suzy worked on skills to manage depressive and anxiety symptoms.   She also worked on communication skills and assertiveness.  She worked on skills to managing living with a serious health condition.    Community support/health:  [place] [address] ([phone]) [email or web address], [place] Crisis text line( Text [place] to 875796),  [facility]  [place], [place] ([phone])  Merline Medley [facility] [place], [place] ( [phone])       Managing Symptoms and Preventing Relapse    * Go to all of your appointments    * Take all medications as directed.      * Carry a current list if medication with you    * Do not use illicit (street) drugs.  Avoid alcohol    * Report these symptoms to your care team. These are early signs of relapse:   Thoughts of suicide   Losing more sleep   Increased confusion   Mood getting worse   Feeling more aggressive   Other:  Increased isolating, sleeping all day    *Use these skills daily:  Talk to someone you trust at least one time weekly, set boundaries  "and say \"no\", be assertive, act opposite of negative feelings, accept challenges with a positive attitude, exercise at least three times per week for 30 minutes,  get enough sleep, eat healthy foods, get into a good routine    Copy of summary sent to: In Epic    Follow up with psychiatrist / main caregiver: Dr. Bonilla    Next visit: monthly or as scheduled.    Follow up with your therapist: Suzy is calling to schedule a return to a previous therapist.   Next visit: To be scheduled.    Go to group therapy and / or support groups at: [facility] Connection and Depression Bipolar Support [place](DBSA) support groups    See your medical doctor about:  For an annual physical exam or any general wellness or illness as needed.    Other:  Your treatment team appreciates having the opportunity to work with you and wishes you the best.    Client Signature:_______________________  Date / Time:___________  Staff Signature:________________________   Date / Time:___________      "

## 2020-05-20 NOTE — PROGRESS NOTES
Acknowledgement of Current Treatment Plan       I have reviewed my treatment plan with my therapist  on 5/28/20.   I agree with the plan as it is written in the electronic health record. (5A)    Name:      Signature:  Suzy Rodríguez Unable to sign due to COVID 19 epidemic     Dr Jorge Johnson MD  Psychiatrist    Dr. Antoinette Morton, [facility],     Sulma Arshad, [facility]  Psychotherapist Sulma Arshad, [facility], [facility]

## 2020-05-21 ENCOUNTER — HOSPITAL ENCOUNTER (OUTPATIENT)
Dept: BEHAVIORAL HEALTH | Facility: CLINIC | Age: 35
End: 2020-05-21
Attending: PSYCHIATRY & NEUROLOGY
Payer: COMMERCIAL

## 2020-05-21 PROCEDURE — 90853 GROUP PSYCHOTHERAPY: CPT | Mod: GT | Performed by: SOCIAL WORKER

## 2020-05-21 NOTE — GROUP NOTE
Psychotherapy Group Note    PATIENT'S NAME: Suzy Rodríguez  MRN:   [id number]  :   1985  ACCT. NUMBER: [id number]  DATE OF SERVICE: 20  START TIME: 10:00 AM  END TIME: 10:50 AM  FACILITATOR: Sulma Arshad LICSW  TOPIC: MH EBP Group: Behavioral Activation  Adult Mental Health Day Treatment  TRACK: 5A    NUMBER OF PARTICIPANTS: 6    Summary of Group / Topics Discussed:  Behavioral Activation: [place]: {Patients identified situations that prompt unwanted and unhelpful emotions / thoughts / behaviors.   Patients discussed how to problem solve by proactively using coping skills in potentially difficult situations. Components included describing the situation, brainstorming coping skills, imagining how scenario can/will unfold, rehearsing the action plan, and practicing relaxation to follow.  Patients practiced using these skills to reduce symptom distress and increase effective coping  behaviors.      Patient Session Goals / Objectives:    Identify difficult situation(s), and gain proficiency with alternative behaviors / skills to problem solve.    Increase confidence using coping skills through group practice in session.    Receive and provide feedback regarding skill development.    Apply coping skills in daily life situations.    Telemedicine Visit: The patient's condition can be safely assessed and treated via synchronous audio and visual telemedicine encounter.      Reason for Telemedicine Visit: Services only offered telehealth    Originating Site (Patient Location): Patient's home    Distant Site (Provider Location): Provider Remote Setting    Consent:  The patient/guardian has verbally consented to: the potential risks and benefits of telemedicine (video visit) versus in person care; bill my insurance or make self-payment for services provided; and responsibility for payment of non-covered services.     Mode of Communication:  Video Conference via [employer]    As the provider I  attest to compliance with applicable laws and regulations related to telemedicine.       Patient Participation / Response:  Moderately participated, sharing some personal reflections / insights and adequately adequately received / provided feedback with other participants.    Identified / Expressed personal readiness to make behavioral change    Treatment Plan:  Patient has an initial individualized treatment plan that was created as part of their diagnostic assessment / admission process.  A master individualized treatment plan is in the process of being developed with the patient and multi-disciplinary care team.    Sulma Arshad, TIMSW

## 2020-05-21 NOTE — GROUP NOTE
Psychotherapy Group Note    PATIENT'S NAME: Suzy Rodríguez  MRN:   [id number]  :   1985  ACCT. NUMBER: [id number]  DATE OF SERVICE: 20  START TIME: 11:00 AM  END TIME: 11:50 AM  FACILITATOR: Sulma Metzger LICSW  TOPIC:  EBP Group: Enhanced Mindfulness  Adult Mental Health Day Treatment  TRACK: 5A    NUMBER OF PARTICIPANTS: 5    Summary of Group / Topics Discussed:  Enhanced Mindfulness: Body and Mind Integration: Patients received an overview and education regarding the importance of including the body in the management of emotional health and self-care and as a direct route to mindfulness practice.  Patients discussed various ways in which the body can serve as an informant to their physical and emotional experiences/need. Patients discussed the body as a direct link to the present moment and to mindfulness practice.  Patients discussed current relationship with body, self-awareness, mindfulness practice and barriers to connection with body.  Patients were guided through breath work and movement to facilitate greater self-awareness, grounding, self-expression, and connection to other.  Patients discussed how the experiential could be applied to better manage mental health and develop walker connection to self.    Patient Session Goals / Objectives:    Identify how movement awareness could be used for grounding, stress management, self-expression, connection to other and self-regulation    Improved awareness of breath and movement preferences    Identify how movement and the body is used in mindfulness practice    Reflect on use of these practices in everyday life.    Identify barriers to attending to body.    Telemedicine Visit: The patient's condition can be safely assessed and treated via synchronous audio and visual telemedicine encounter.          Reason for Telemedicine Visit: Services only offered telehealth and covid19        Originating Site (Patient Location): Patient's  home        Distant Site (Provider Location): Provider Remote Setting        Consent:  The patient/guardian has verbally consented to: the potential risks and benefits of telemedicine (video visit) versus in person care; bill my insurance or make self-payment for services provided; and responsibility for payment of non-covered services.         Mode of Communication:  Video Conference via [employer]        As the provider I attest to compliance with applicable laws and regulations related to telemedicine.       Patient Participation / Response:  Fully participated with the group by sharing personal reflections / insights and openly received / provided feedback with other participants.    Demonstrated understanding of topics discussed through group discussion and participation and Practiced skills in session    Treatment Plan:  Patient has an initial individualized treatment plan that was created as part of their diagnostic assessment / admission process.  A master individualized treatment plan is in the process of being developed with the patient and multi-disciplinary care team.    YSABEL Rivera

## 2020-05-21 NOTE — GROUP NOTE
Process Group Note    PATIENT'S NAME: Suzy Rodríguez  MRN:   [id number]  :   1985  ACCT. NUMBER: [id number]  DATE OF SERVICE: 20  START TIME:  9:00 AM  END TIME:  9:50 AM  FACILITATOR: Sulma Arshad LICSW  TOPIC:  Process Group    Diagnoses:  296.32 (F33.1) Major Depressive Disorder, Recurrent Episode, Moderate _ and With anxious distress  300.02 (F41.1) Generalized Anxiety Disorder      Adult Mental Health Day Treatment  TRACK: 5A    NUMBER OF PARTICIPANTS: 6    Telemedicine Visit: The patient's condition can be safely assessed and treated via synchronous audio and visual telemedicine encounter.      Reason for Telemedicine Visit: Services only offered telehealth    Originating Site (Patient Location): Patient's home    Distant Site (Provider Location): Provider Remote Setting    Consent:  The patient/guardian has verbally consented to: the potential risks and benefits of telemedicine (video visit) versus in person care; bill my insurance or make self-payment for services provided; and responsibility for payment of non-covered services.     Mode of Communication:  Video Conference via [employer]    As the provider I attest to compliance with applicable laws and regulations related to telemedicine.           Data:    Session content: At the start of this group, patients were invited to check in by identifying themselves, describing their current emotional status, and identifying issues to address in this group.   Area(s) of treatment focus addressed in this session included Symptom Management, Personal Safety and Community Resources/Discharge Planning.  Suzy reported distress that her Mother does not spend more time with her.  She stated that her Mother spends most of her time at the family cabin or wintering in [place].  She stated that her mother took care of her grandfather when he was ill with cancer, but she has not created that time with her since she has had cancer.  Client  stated that she was diagnosed with cancer two years ago.  She stated that her sister and  are her major supporters.  Client participated in discussions of possible requests she may make of her mother for additional support.      Therapeutic Interventions/Treatment Strategies:  Psychotherapist offered support, feedback and validation and reinforced use of skills. Treatment modalities used include Cognitive Behavioral Therapy. Interventions include Relationship Skills: Assisted patients in implementing more effective communication skills in their relationships.    Assessment:    Patient response:   Patient responded to session by listening, focusing on goals and being attentive    Possible barriers to participation / learning include: and no barriers identified    Health Issues:   None reported       Substance Use Review:   Substance Use: No active concerns identified.    Mental Status/Behavioral Observations  Appearance:   Appropriate   Eye Contact:   Good   Psychomotor Behavior: Normal   Attitude:   Cooperative   Orientation:   All  Speech   Rate / Production: Normal    Volume:  Normal   Mood:    Anxious  Depressed   Affect:    Appropriate   Thought Content:   Clear  Thought Form:  Coherent  Logical     Insight:    Good     Plan:     Safety Plan: No current safety concerns identified.  Recommended that patient call 911 or go to the local ED should there be a change in any of these risk factors.     Barriers to treatment: None identified    Patient Contracts (see media tab):  None    Substance Use: Not addressed in session     Continue or Discharge: Patient will continue in Adult Day Treatment (ADT)  as planned. Patient is likely to benefit from learning and using skills as they work toward the goals identified in their treatment plan.      Sulma Arshad, [facility]  May 21, 2020

## 2020-05-26 ENCOUNTER — HOSPITAL ENCOUNTER (OUTPATIENT)
Dept: BEHAVIORAL HEALTH | Facility: CLINIC | Age: 35
End: 2020-05-26
Attending: PSYCHIATRY & NEUROLOGY
Payer: COMMERCIAL

## 2020-05-26 ENCOUNTER — TELEPHONE (OUTPATIENT)
Dept: BEHAVIORAL HEALTH | Facility: CLINIC | Age: 35
End: 2020-05-26

## 2020-05-26 PROCEDURE — 90853 GROUP PSYCHOTHERAPY: CPT | Mod: 95

## 2020-05-26 PROCEDURE — G0177 OPPS/PHP; TRAIN & EDUC SERV: HCPCS | Mod: 95

## 2020-05-26 PROCEDURE — 90853 GROUP PSYCHOTHERAPY: CPT | Mod: GT

## 2020-05-26 NOTE — ADDENDUM NOTE
Encounter addended by: Azalea Avila LICSW on: 5/26/2020 4:26 PM   Actions taken: Clinical Note Signed

## 2020-05-26 NOTE — PROGRESS NOTES
Acknowledgement of Current Treatment Plan       I have reviewed my treatment plan with my therapist  on 5/26/2020    I agree with the plan as it is written in the electronic health record. (5A)    Name:      Signature:  Suzy Rodríguez Unavailable for signature due to COVID19     Dr Jorge Johnson MD  Psychiatrist    Dr. Antoinette Morton, [facility],     Sulma Arshad, [facility]  Psychotherapist    Azalea Avila [facility]  Psychotherapist Unavailable for signature    YSABEL Francisco - verbally reviewed with Suzy via phone

## 2020-05-26 NOTE — TELEPHONE ENCOUNTER
Writer called Pt this afternoon and developed and reviewed her treatment plan.  Will pass this along to her main team members to they can continue to develop and adjust throughout her treatment services.

## 2020-05-26 NOTE — GROUP NOTE
Psychoeducation Group Note    PATIENT'S NAME: Suzy Rodríguez  MRN:   [id number]  :   1985  ACCT. NUMBER: [id number]  DATE OF SERVICE: 20  START TIME: 10:00 AM  END TIME: 10:50 AM  FACILITATOR: Ap Montanez OTR/L  TOPIC: MH Life Skills Group: Resiliency Development  Telemedicine Visit: The patient's condition can be safely assessed and treated via synchronous audio and visual telemedicine encounter.      Reason for Telemedicine Visit: COVID-19    Originating Site (Patient Location): Patient's home    Distant Site (Provider Location): [facility]: [facility]    Consent:  The patient/guardian has verbally consented to: the potential risks and benefits of telemedicine (video visit) versus in person care; bill my insurance or make self-payment for services provided; and responsibility for payment of non-covered services.     Mode of Communication:  Video Conference via [employer]    As the provider I attest to compliance with applicable laws and regulations related to telemedicine.   Adult Mental Health Day Treatment  TRACK: 5A    NUMBER OF PARTICIPANTS: 2 (but was expecting 3)     Summary of Group / Topics Discussed:  Resiliency Development:  Self Esteem and Self Compassion: A Letter to Myself: Patients were given time for reflection and built self awareness around components of self compassion and how it relates to self esteem and overall functioning.  Patients were also given the opportunity to improve self efficacy, self sufficiency, quality of life and a sense of mastery and competency by identifying what is good and to instill hope. Patients were taught about the importance of self kindness and ways to challenge negative thinking.      Patient Session Goals / Objectives:    Identified personal strengths and qualities about themselves as a way to provide hope and build self-compassion as a way to effectively manage both mental health and substance abuse/abuse symptoms  "    Improved awareness of positive qualities and how these contribute to the process of recovery        Established a plan for practice of these skills in their own environments    Practiced and reflected on how to generalize taught skills to their everyday life        Patient Participation / Response:  Fully participated with the group by sharing personal reflections / insights and openly received / provided feedback with other participants.    Patient Presentation: Calm,alert,focused with stable mood and thought process. Patient describes self as \"bland and boring\". Only getting about 6 hours of sleep per night because  is a  and his pager goes off often. Some night patient is able to get 8 hours.    Treatment Plan:  Patient has a current master individualized treatment plan.  See Epic treatment plan for more information.    Ap Montanez, OTR/L  "

## 2020-05-26 NOTE — GROUP NOTE
Process Group Note    PATIENT'S NAME: Suzy Rodríguez  MRN:   [id number]  :   1985  [id number]  DATE OF SERVICE: 20  START TIME:  9:00 AM  END TIME:  9:50 AM  FACILITATOR: Radhika Stevenson LICSW  TOPIC:  Process Group    Diagnoses:  296.32 (F33.1) Major Depressive Disorder, Recurrent Episode, Moderate _ and With anxious distress  300.02 (F41.1) Generalized Anxiety Disorder      Adult Mental Health Day Treatment  TRACK: 5A  Telemedicine Visit: The patient's condition can be safely assessed and treated via synchronous audio and visual telemedicine encounter.      Reason for Telemedicine Visit: Services only offered telehealth    Originating Site (Patient Location): Patient's home    Distant Site (Provider Location): [facility]    Consent:  The patient/guardian has verbally consented to: the potential risks and benefits of telemedicine (video visit) versus in person care; bill my insurance or make self-payment for services provided; and responsibility for payment of non-covered services.     Mode of Communication:  Video Conference via [employer]    As the provider I attest to compliance with applicable laws and regulations related to telemedicine.     NUMBER OF PARTICIPANTS: 3          Data:    Session content: At the start of this group, patients were invited to check in by identifying themselves, describing their current emotional status, and identifying issues to address in this group.   Area(s) of treatment focus addressed in this session included Symptom Management and Personal Safety.  Pt reports sadness and worry about living with cancer. She is experiencing side effects from chemo.She feels guilty about her inability to help around the house or parent like she used to. She worries about the future of her marriage, despite 's reassurance. She is sad that her mother is not emotionally available. We discussed ways to connect with her children and pt already  "spends special time with them each daily. We discussed ways to be direct with her mom, knowing that her pattern is to avoid painful situations. She is looking forward to their pool opening up this week and feels that she will be more able to exercise. Patient is also concerned about her weight gain. No safety concerns.    Therapeutic Interventions/Treatment Strategies:  Psychotherapist offered support, feedback and validation and reinforced use of skills. Treatment modalities used include Cognitive Behavioral Therapy. Interventions include Cognitive Restructuring:  Explored impact of ineffective thoughts / distortions on mood and activity and Coping Skills: Discussed how the use of intentional \"in the moment\" actions can help reduce distress.    Assessment:    Patient response:   Patient responded to session by accepting feedback, giving feedback, listening, focusing on goals, being attentive and accepting support    Possible barriers to participation / learning include: and no barriers identified    Health Issues:   Yes: cancer and side effects       Substance Use Review:   Substance Use: No active concerns identified.    Mental Status/Behavioral Observations  Appearance:   Appropriate   Eye Contact:   Good   Psychomotor Behavior: Retarded (Slowed)   Attitude:   Cooperative   Orientation:   All  Speech   Rate / Production: Normal/ Responsive Normal    Volume:  Normal   Mood:    Anxious  Depressed   Affect:    Appropriate   Thought Content:   Clear  Thought Form:  Coherent  Logical     Insight:    Good     Plan:     Safety Plan: Recommended that patient call 911 or go to the local ED should there be a change in any of these risk factors.     Barriers to treatment: None identified    Patient Contracts (see media tab):  None    Substance Use: Not addressed in session     Continue or Discharge: Patient will continue in Adult Day Treatment (ADT)  as planned. Patient is likely to benefit from learning and using skills as " they work toward the goals identified in their treatment plan.      Radhika Johnson, [facility]  May 26, 2020

## 2020-05-27 ENCOUNTER — COMMUNICATION - HEALTHEAST (OUTPATIENT)
Dept: FAMILY MEDICINE | Facility: CLINIC | Age: 35
End: 2020-05-27

## 2020-05-27 DIAGNOSIS — F33.2 SEVERE EPISODE OF RECURRENT MAJOR DEPRESSIVE DISORDER, WITHOUT PSYCHOTIC FEATURES (H): ICD-10-CM

## 2020-05-28 ENCOUNTER — HOSPITAL ENCOUNTER (OUTPATIENT)
Dept: BEHAVIORAL HEALTH | Facility: CLINIC | Age: 35
End: 2020-05-28
Attending: PSYCHIATRY & NEUROLOGY
Payer: COMMERCIAL

## 2020-05-28 DIAGNOSIS — C74.01 MALIGNANT NEOPLASM OF CORTEX OF RIGHT ADRENAL GLAND (H): Primary | ICD-10-CM

## 2020-05-28 PROCEDURE — 90853 GROUP PSYCHOTHERAPY: CPT | Mod: GT | Performed by: SOCIAL WORKER

## 2020-05-28 PROCEDURE — G0177 OPPS/PHP; TRAIN & EDUC SERV: HCPCS | Mod: GT | Performed by: OCCUPATIONAL THERAPIST

## 2020-05-28 PROCEDURE — 90853 GROUP PSYCHOTHERAPY: CPT | Mod: GT | Performed by: COUNSELOR

## 2020-05-28 RX ORDER — SODIUM CHLORIDE 9 MG/ML
1000 INJECTION, SOLUTION INTRAVENOUS CONTINUOUS PRN
Status: CANCELLED
Start: 2020-05-28

## 2020-05-28 RX ORDER — ALBUTEROL SULFATE 90 UG/1
1-2 AEROSOL, METERED RESPIRATORY (INHALATION)
Status: CANCELLED
Start: 2020-05-28

## 2020-05-28 RX ORDER — DIPHENHYDRAMINE HYDROCHLORIDE 50 MG/ML
50 INJECTION INTRAMUSCULAR; INTRAVENOUS
Status: CANCELLED
Start: 2020-05-28

## 2020-05-28 RX ORDER — ALBUTEROL SULFATE 0.83 MG/ML
2.5 SOLUTION RESPIRATORY (INHALATION)
Status: CANCELLED | OUTPATIENT
Start: 2020-05-28

## 2020-05-28 RX ORDER — MEPERIDINE HYDROCHLORIDE 25 MG/ML
25 INJECTION INTRAMUSCULAR; INTRAVENOUS; SUBCUTANEOUS EVERY 30 MIN PRN
Status: CANCELLED | OUTPATIENT
Start: 2020-05-28

## 2020-05-28 RX ORDER — EPINEPHRINE 0.3 MG/.3ML
0.3 INJECTION SUBCUTANEOUS EVERY 5 MIN PRN
Status: CANCELLED | OUTPATIENT
Start: 2020-05-28

## 2020-05-28 RX ORDER — EPINEPHRINE 1 MG/ML
0.3 INJECTION, SOLUTION, CONCENTRATE INTRAVENOUS EVERY 5 MIN PRN
Status: CANCELLED | OUTPATIENT
Start: 2020-05-28

## 2020-05-28 NOTE — GROUP NOTE
Psychoeducation Group Note    PATIENT'S NAME: Suzy Rodríguez  MRN:   [id number]  :   1985  ACCT. NUMBER: [id number]  DATE OF SERVICE: 20  START TIME: 10:00 AM  END TIME: 10:50 AM  FACILITATOR: Oswaldo Roman OTR/L  TOPIC: MH Life Skills Group: Sensory Approaches in Mental Health  Adult Mental Health Day Treatment  TRACK: 5A    NUMBER OF PARTICIPANTS: 3  Telemedicine Visit: The patient's condition can be safely assessed and treated via synchronous audio and visual telemedicine encounter.      Reason for Telemedicine Visit: Services only offered telehealth    Originating Site (Patient Location): Patient's home    Distant Site (Provider Location): [facility]: [facility]    Consent:  The patient/guardian has verbally consented to: the potential risks and benefits of telemedicine (video visit) versus in person care; bill my insurance or make self-payment for services provided; and responsibility for payment of non-covered services.     Mode of Communication:  Video Conference via [employer]    As the provider I attest to compliance with applicable laws and regulations related to telemedicine.      Summary of Group / Topics Discussed:  Sensory Approaches in Mental Health:  Sensory Enhanced Mindfulness: Patients were taught and provided with an opportunity to explore and practice how using sensory enhanced mindfulness practices can help them stay grounded in the present moment as a way to manage mental health symptoms and stressors.         Patient Session Goals / Objectives:    Identified how using sensory enhanced mindfulness practices can be used for grounding, stress management, and self regulation      Improved awareness of different types of sensory enhanced mindfulness activities that assist with healthy coping of stress and symptoms      Established a plan for practice of these skills in their own environments    Practiced and reflected on how to generalize taught skills to  their everyday life        Patient Participation / Response:  Fully participated with the group by sharing personal reflections / insights and openly received / provided feedback with other participants.    Patient presentation: open to learn and try some of the techniques, did note some things she does try to practice - has tried in past for herself, Verbalized understanding of content and Patient would benefit from additional opportunities to practice the content to be able to generalize it to their everyday life with increased intentionality, consistency, and efficacy in support of their psychiatric recovery    Treatment Plan:  Patient has a current master individualized treatment plan.  See Epic treatment plan for more information.    Oswaldo Roman, OTR/L

## 2020-05-28 NOTE — GROUP NOTE
Psychotherapy Group Note    PATIENT'S NAME: Suzy Rodríguez  MRN:   [id number]  :   1985  [id number]  DATE OF SERVICE: 20  START TIME: 11:00 AM  END TIME: 11:50 AM  FACILITATOR: Sulma Metzger LICSW  TOPIC: MH EBP Group: Specialty Awareness  Adult Mental Health Day Treatment  TRACK: 5A    NUMBER OF PARTICIPANTS: 3    Summary of Group / Topics Discussed:  Specialty Topics: Hope: The topic of hope was presented in order to help patients better understand the symptoms of hopelessness and how to become more hopeful. Patients discussed their current awareness of the topic and relevance to their functioning. Individual experiences with symptoms and treatment options were also discussed. Patients explored options for ongoing/future treatment and symptom management.      Patient Session Goals / Objectives:    Discussed definition of hopelessness    Discussed how hopelessness impacts functioning    Set a plan to utilize skills to reduce hopelessness    Telemedicine Visit: The patient's condition can be safely assessed and treated via synchronous audio and visual telemedicine encounter.          Reason for Telemedicine Visit: Services only offered telehealth and covid19        Originating Site (Patient Location): Patient's home        Distant Site (Provider Location): Provider Remote Setting        Consent:  The patient/guardian has verbally consented to: the potential risks and benefits of telemedicine (video visit) versus in person care; bill my insurance or make self-payment for services provided; and responsibility for payment of non-covered services.         Mode of Communication:  Video Conference via [employer]        As the provider I attest to compliance with applicable laws and regulations related to telemedicine.         Patient Participation / Response:  Moderately participated, sharing some personal reflections / insights and adequately adequately received / provided feedback  with other participants.    Demonstrated understanding of topics discussed through group discussion and participation    Treatment Plan:  Patient has an initial individualized treatment plan that was created as part of their diagnostic assessment / admission process.  A master individualized treatment plan is in the process of being developed with the patient and multi-disciplinary care team.    YSABEL Rivera
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
## 2020-06-01 ENCOUNTER — HOSPITAL ENCOUNTER (OUTPATIENT)
Dept: BEHAVIORAL HEALTH | Facility: CLINIC | Age: 35
End: 2020-06-01
Attending: PSYCHIATRY & NEUROLOGY
Payer: COMMERCIAL

## 2020-06-01 PROCEDURE — G0177 OPPS/PHP; TRAIN & EDUC SERV: HCPCS | Mod: GT

## 2020-06-01 PROCEDURE — 90853 GROUP PSYCHOTHERAPY: CPT | Mod: GT,95 | Performed by: SOCIAL WORKER

## 2020-06-01 NOTE — GROUP NOTE
Process Group Note    PATIENT'S NAME: Suzy Rodríguez  MRN:   [id number]  :   1985  [id number]  DATE OF SERVICE: 20  START TIME:  9:00 AM  END TIME:  9:50 AM  FACILITATOR: Sulma Arshad LICSW  TOPIC:  Process Group    Diagnoses:  296.32 (F33.1) Major Depressive Disorder, Recurrent Episode, Moderate _ and With anxious distress  300.02 (F41.1) Generalized Anxiety Disorder      Adult Mental Health Day Treatment  TRACK: 5A    NUMBER OF PARTICIPANTS: 5    Telemedicine Visit: The patient's condition can be safely assessed and treated via synchronous audio and visual telemedicine encounter.      Reason for Telemedicine Visit: Services only offered telehealth    Originating Site (Patient Location): Patient's home    Distant Site (Provider Location): Provider Remote Setting    Consent:  The patient/guardian has verbally consented to: the potential risks and benefits of telemedicine (video visit) versus in person care; bill my insurance or make self-payment for services provided; and responsibility for payment of non-covered services.     Mode of Communication:  Video Conference via [employer]    As the provider I attest to compliance with applicable laws and regulations related to telemedicine.           Data:    Session content: At the start of this group, patients were invited to check in by identifying themselves, describing their current emotional status, and identifying issues to address in this group.   Area(s) of treatment focus addressed in this session included Symptom Management, Personal Safety and Community Resources/Discharge Planning.  Suzy reported having a nice weekend.  She enjoyed the weather.  She stated that she has been waking up lately with a headache and dry mouth, so she got out her CPAP that she used at a higher weight, and found that using it helped eliminate the headache.  She stated that she has stable appetite, wakefulness several times in the night,  and low energy.  Depressed mod was improved.  She denied suicidal ideation.     Therapeutic Interventions/Treatment Strategies:  Psychotherapist offered support, feedback and validation and reinforced use of skills. Treatment modalities used include Cognitive Behavioral Therapy. Interventions include Coping Skills: Assisted patient in understanding the purpose of planning / creating / participating / sharing in positive experiences.    Assessment:    Patient response:   Patient responded to session by listening and focusing on goals    Possible barriers to participation / learning include: and no barriers identified    Health Issues:   Yes: ongoing cancer treatment, No Psychological Distress       Substance Use Review:   Substance Use: No active concerns identified.    Mental Status/Behavioral Observations  Appearance:   Appropriate   Eye Contact:   Good   Psychomotor Behavior: Normal   Attitude:   Cooperative   Orientation:   All  Speech   Rate / Production: Normal    Volume:  Normal   Mood:    Normal  Affect:    Appropriate   Thought Content:   Clear  Thought Form:  Coherent  Logical     Insight:    Good     Plan:     Safety Plan: No current safety concerns identified.  Recommended that patient call 911 or go to the local ED should there be a change in any of these risk factors.     Barriers to treatment: None identified    Patient Contracts (see media tab):  None    Substance Use: Not addressed in session     Continue or Discharge: Patient will continue in Adult Day Treatment (ADT)  as planned. Patient is likely to benefit from learning and using skills as they work toward the goals identified in their treatment plan.      Sulma Arshad, [facility]  June 1, 2020

## 2020-06-01 NOTE — GROUP NOTE
Psychoeducation Group Note    PATIENT'S NAME: Suzy Rodríguez  MRN:   [id number]  :   1985  [id number]  DATE OF SERVICE: 20  START TIME: 11:00 AM  END TIME: 11:50 AM  FACILITATOR: Genny Kelley RN  TOPIC: NELSON RN Group: [facility]  Telemedicine Visit: The patient's condition can be safely assessed and treated via synchronous audio and visual telemedicine encounter.      Reason for Telemedicine Visit:  COVID19    Originating Site (Patient Location): Patient's home    Distant Site (Provider Location): Provider Remote Setting    Consent:  The patient/guardian has verbally consented to: the potential risks and benefits of telemedicine (video visit) versus in person care; bill my insurance or make self-payment for services provided; and responsibility for payment of non-covered services.     Mode of Communication:  Video Conference via [employer]    As the provider I attest to compliance with applicable laws and regulations related to telemedicine.      Adult Mental Health Day Treatment  TRACK: 5A    NUMBER OF PARTICIPANTS: 5    Summary of Group / Topics Discussed:  Foundations of Health: Nutrition: Macronutrients: Patient were provided education on major macronutrients, their role in the body, and why it is important to meet daily nutritional needs. Obstacles to meeting daily nutritional needs were identified in group discussion and strategies to promote improved nutrition were explored. Macronutrients discussed include: carbohydrates, proteins and amino acids, fats, fiber, and water. Gut-brain relationship also discussed as well as nutrition and brain health.     Patient Session Goals / Objectives:  ? Discussed the role of diet on mood, physical health, energy level, and the development of chronic disease.  ? Identified daily nutritional needs recommended by the USDA via My Plate education resources  ? Developing increased health literacy skills in finding credible nutrition  information from reliable sources        Patient Participation / Response:  Minimally participated, only when prompted / asked.    Verbalized understanding of foundations of health topic    Treatment Plan:  Patient has an initial individualized treatment plan that was created as part of their diagnostic assessment / admission process.  A master individualized treatment plan is in the process of being developed with the patient and multi-disciplinary care team.    Genny Kelley RN

## 2020-06-01 NOTE — GROUP NOTE
Psychotherapy Group Note    PATIENT'S NAME: Suzy Rodríguez  MRN:   [id number]  [facility]   1985  [id number]  DATE OF SERVICE: 20  START TIME: 10:00 AM  END TIME: 10:50 AM  FACILITATOR: Sulma Arshad LICSW  TOPIC:  EBP Group: Coping Skills  Adult Mental Health Day Treatment  TRACK: 5A    NUMBER OF PARTICIPANTS: 5    Summary of Group / Topics Discussed:  Coping Skills: Radical Acceptance: Patients learned radical acceptance as a way to tolerate heightened stress in the moment.  Patients identified situations that necessitate radical acceptance.  They focused on applying acceptance of the moment to tolerate difficult emotions and events. Patients discussed how to distinguish when this can be useful in their lives and when other skills are more relevant or helpful.    Patient Session Goals / Objectives:    Understand that some amount of pain exists for each of us in our lives    Process the difficulty of acceptance in our lives and benefits of radical acceptance to mood and functioning.    Demonstrate understanding of when to use the radical acceptance to tolerate distress by providing examples of situations where this could be applied.    Identify barriers to applying radical acceptance to difficult situations and explore strategies to overcome them    Telemedicine Visit: The patient's condition can be safely assessed and treated via synchronous audio and visual telemedicine encounter.      Reason for Telemedicine Visit: Services only offered telehealth    Originating Site (Patient Location): Patient's home    Distant Site (Provider Location): Provider Remote Setting    Consent:  The patient/guardian has verbally consented to: the potential risks and benefits of telemedicine (video visit) versus in person care; bill my insurance or make self-payment for services provided; and responsibility for payment of non-covered services.     Mode of Communication:  Video Conference via  [place]    As the provider I attest to compliance with applicable laws and regulations related to telemedicine.       Patient Participation / Response:  Fully participated with the group by sharing personal reflections / insights and openly received / provided feedback with other participants.    Identified 2-3 positive coping strategies that have helped maintain / improve symptoms in the past    Treatment Plan:  Patient has a current master individualized treatment plan.  See Epic treatment plan for more information.    Sulma Arshad, [facility]

## 2020-06-02 ENCOUNTER — HOSPITAL ENCOUNTER (OUTPATIENT)
Dept: BEHAVIORAL HEALTH | Facility: CLINIC | Age: 35
End: 2020-06-02
Attending: PSYCHIATRY & NEUROLOGY
Payer: COMMERCIAL

## 2020-06-02 PROCEDURE — 90853 GROUP PSYCHOTHERAPY: CPT | Mod: GT,95 | Performed by: SOCIAL WORKER

## 2020-06-02 PROCEDURE — G0177 OPPS/PHP; TRAIN & EDUC SERV: HCPCS | Mod: GT,95

## 2020-06-02 NOTE — GROUP NOTE
Process Group Note    PATIENT'S NAME: Suzy Rodríguez  MRN:   [id number]  :   1985  ACCT. NUMBER: [id number]  DATE OF SERVICE: 20  START TIME:  9:00 AM  END TIME:  9:50 AM  FACILITATOR: Sulma Arshad LICSW  TOPIC:  Process Group    Diagnoses:  296.32 (F33.1) Major Depressive Disorder, Recurrent Episode, Moderate _ and With anxious distress  300.02 (F41.1) Generalized Anxiety Disorder      Adult Mental Health Day Treatment  TRACK: 5A    NUMBER OF PARTICIPANTS: 3 plus one late arrival with no charge    Telemedicine Visit: The patient's condition can be safely assessed and treated via synchronous audio and visual telemedicine encounter.      Reason for Telemedicine Visit: Services only offered telehealth    Originating Site (Patient Location): Patient's home    Distant Site (Provider Location): Provider Remote Setting    Consent:  The patient/guardian has verbally consented to: the potential risks and benefits of telemedicine (video visit) versus in person care; bill my insurance or make self-payment for services provided; and responsibility for payment of non-covered services.     Mode of Communication:  Video Conference via [employer]    As the provider I attest to compliance with applicable laws and regulations related to telemedicine.       Data:    Session content: At the start of this group, patients were invited to check in by identifying themselves, describing their current emotional status, and identifying issues to address in this group.   Area(s) of treatment focus addressed in this session included Symptom Management, Personal Safety and Community Resources/Discharge Planning.  Suzy reported feeling sad/hurt that her Mom stopped briefly to drop off treats for her kids but did not stay to visit.  She stated that she discussed this with her brother as Mom spent about an hour at his house.  She stated that her brother shared that he believes it is due to the fact that her  children are older and his kids are younger with the excitement about seeing grandparents.  She stated that the discussion with her brother let her feel less hurt.  =Reviewed possible use of assertive communication and requesting to do an activity or meal with Mom as client does not wish to share her feelings at this time.      Therapeutic Interventions/Treatment Strategies:  Psychotherapist offered support, feedback and validation and reinforced use of skills. Treatment modalities used include Cognitive Behavioral Therapy. Interventions include Relationship Skills: Assisted patients in implementing more effective communication skills in their relationships.    Assessment:    Patient response:   Patient responded to session by focusing on goals and being attentive    Possible barriers to participation / learning include: and no barriers identified    Health Issues:   None reported       Substance Use Review:   Substance Use: No active concerns identified.    Mental Status/Behavioral Observations  Appearance:   Appropriate   Eye Contact:   Good   Psychomotor Behavior: Normal   Attitude:   Cooperative   Orientation:   All  Speech   Rate / Production: Normal    Volume:  Normal   Mood:    Anxious  Depressed   Affect:    Appropriate   Thought Content:   Clear  Thought Form:  Coherent  Logical     Insight:    Good     Plan:     Safety Plan: No current safety concerns identified.  Recommended that patient call 911 or go to the local ED should there be a change in any of these risk factors.     Barriers to treatment: None identified    Patient Contracts (see media tab):  None    Substance Use: Not addressed in session     Continue or Discharge: Patient will continue in Adult Day Treatment (ADT)  as planned. Patient is likely to benefit from learning and using skills as they work toward the goals identified in their treatment plan.      Sulma Arshad, [facility]  June 2, 2020

## 2020-06-02 NOTE — GROUP NOTE
Psychoeducation Group Note    PATIENT'S NAME: Suzy Rodríguez  MRN:   [id number]  :   1985  ACCT. NUMBER: [id number]  DATE OF SERVICE: 20  START TIME: 10:00 AM  END TIME: 10:50 AM  FACILITATOR: Ap Montanez OTR/L  TOPIC: MH Life Skills Group: Resiliency Development  Telemedicine Visit: The patient's condition can be safely assessed and treated via synchronous audio and visual telemedicine encounter.      Reason for Telemedicine Visit: COVID-19    Originating Site (Patient Location): Patient's home    Distant Site (Provider Location): [facility]    Consent:  The patient/guardian has verbally consented to: the potential risks and benefits of telemedicine (video visit) versus in person care; bill my insurance or make self-payment for services provided; and responsibility for payment of non-covered services.     Mode of Communication:  Video Conference via [employer]    As the provider I attest to compliance with applicable laws and regulations related to telemedicine.   Adult Mental Health Day Treatment  TRACK: 5A    NUMBER OF PARTICIPANTS: 4    Summary of Group / Topics Discussed:  Resiliency Development:  Self Awareness (Emotional Awareness and Self-Knowledge): Thinking about Change: Patients explored and identified their strengths, emotions, barriers, skills, and behavior patterns that occur when changes happen in life.  Focus was on recognizing these aspects and developing ways to help support moving forward, making changes as needed to support resiliency.      Patient Session Goals / Objectives:    Identified emotions, barriers, skills, strengths, and behavior patterns that occur when changes happen in life and how to effectively cope     Improved awareness of the process of change and skills and strategies that support this       Established a plan for practice of these skills in their own environments    Practiced and reflected on how to generalize taught  skills to their everyday life        Patient Participation / Response:  Fully participated with the group by sharing personal reflections / insights and openly received / provided feedback with other participants.    Patient Presentation: Calm,alert,focused with stable mood and thought process.    Treatment Plan:  Patient has a current master individualized treatment plan.  See Epic treatment plan for more information.    Ap Montanez, OTR/L

## 2020-06-04 ENCOUNTER — HOSPITAL ENCOUNTER (OUTPATIENT)
Dept: BEHAVIORAL HEALTH | Facility: CLINIC | Age: 35
End: 2020-06-04
Attending: PSYCHIATRY & NEUROLOGY
Payer: COMMERCIAL

## 2020-06-04 PROCEDURE — 90832 PSYTX W PT 30 MINUTES: CPT | Mod: GT | Performed by: SOCIAL WORKER

## 2020-06-04 NOTE — GROUP NOTE
Process Group Note    PATIENT'S NAME: Suzy Rodríguez  MRN:   [id number]  :   1985  ACCT. NUMBER: [id number]  DATE OF SERVICE: 20  START TIME:  9:00 AM  END TIME:  9:50 AM  FACILITATOR: Sulma Arshad LICSW  TOPIC:  Process Group Provided individual therapy as only two members arrived for grou.    Diagnoses:  296.32 (F33.1) Major Depressive Disorder, Recurrent Episode, Moderate _ and With anxious distress  300.02 (F41.1) Generalized Anxiety Disorder      Adult Mental Health Day Treatment  TRACK: 5A    NUMBER OF PARTICIPANTS: individual therapy as only two members arrived    Telemedicine Visit: The patient's condition can be safely assessed and treated via synchronous audio and visual telemedicine encounter.      Reason for Telemedicine Visit: Services only offered telehealth    Originating Site (Patient Location): Patient's home    Distant Site (Provider Location): Provider Remote Setting    Consent:  The patient/guardian has verbally consented to: the potential risks and benefits of telemedicine (video visit) versus in person care; bill my insurance or make self-payment for services provided; and responsibility for payment of non-covered services.     Mode of Communication:  Video Conference via [employer]    As the provider I attest to compliance with applicable laws and regulations related to telemedicine.       Data:    Session content:   Area(s) of treatment focus addressed in this session included Symptom Management, Personal Safety and Community Resources/Discharge Planning.  Iris reported a high lelvel of anxiety as she is having a scan for cancer that is the two year scan.  The scan will happen on Monday.  She stated that she wished to avoid being irritable and anxious prior to the scan.  She stated that she is trying to keep the scan in the back of her mind.  She set goals to keep it in the back of her mind by attending a neighborhood graduation party, watching a family movie  "with her children, and planning to go on a hike over the weekend.  She stated that she could also hang out with her dog.  She stated that she was surprised that her Mom reached out to her to offer support for Monday.  Iris is thinking about asking her Mom to drive her to the appointment.  Suzy will have a follow up doctor appointment on Tuesday.  Client denied suicidal ideation, intent and plan. Mood was depressed and anxious.  Client reported worry.  Motivation and energy were low.      Therapeutic Interventions/Treatment Strategies:  Psychotherapist offered support, feedback and validation and reinforced use of skills. Treatment modalities used include Cognitive Behavioral Therapy. Interventions include Coping Skills: Discussed how the use of intentional \"in the moment\" actions can help reduce distress.    Assessment:    Patient response:   Patient responded to session by listening and focusing on goals    Possible barriers to participation / learning include: and no barriers identified    Health Issues:   Yes: cancer, Associated Psychological Distress       Substance Use Review:   Substance Use: No active concerns identified.    Mental Status/Behavioral Observations  Appearance:   Appropriate   Eye Contact:   Good   Psychomotor Behavior: Normal   Attitude:   Cooperative   Orientation:   All  Speech   Rate / Production: Normal    Volume:  Normal   Mood:    Anxious  Depressed   Affect:    Appropriate   Thought Content:   Clear  Thought Form:  Coherent  Logical     Insight:    Good     Plan:     Safety Plan: No current safety concerns identified.  Recommended that patient call 911 or go to the local ED should there be a change in any of these risk factors.     Barriers to treatment: None identified    Patient Contracts (see media tab):  None    Substance Use: Not addressed in session     Continue or Discharge: Patient will continue in Adult Day Treatment (ADT)  as planned. Patient is likely to benefit from " learning and using skills as they work toward the goals identified in their treatment plan.      Sulma Arshad, [facility]  June 4, 2020

## 2020-06-05 DIAGNOSIS — C74.01 MALIGNANT NEOPLASM OF CORTEX OF RIGHT ADRENAL GLAND (H): Primary | ICD-10-CM

## 2020-06-08 ENCOUNTER — HOSPITAL ENCOUNTER (OUTPATIENT)
Dept: BEHAVIORAL HEALTH | Facility: CLINIC | Age: 35
End: 2020-06-08
Attending: PSYCHIATRY & NEUROLOGY
Payer: COMMERCIAL

## 2020-06-08 ENCOUNTER — COMMUNICATION - HEALTHEAST (OUTPATIENT)
Dept: FAMILY MEDICINE | Facility: CLINIC | Age: 35
End: 2020-06-08

## 2020-06-08 ENCOUNTER — ANCILLARY PROCEDURE (OUTPATIENT)
Dept: CT IMAGING | Facility: CLINIC | Age: 35
End: 2020-06-08
Attending: INTERNAL MEDICINE
Payer: COMMERCIAL

## 2020-06-08 VITALS
RESPIRATION RATE: 16 BRPM | WEIGHT: 150.9 LBS | OXYGEN SATURATION: 97 % | TEMPERATURE: 98.9 F | BODY MASS INDEX: 25.89 KG/M2 | HEART RATE: 74 BPM | DIASTOLIC BLOOD PRESSURE: 71 MMHG | SYSTOLIC BLOOD PRESSURE: 101 MMHG

## 2020-06-08 DIAGNOSIS — F41.1 GENERALIZED ANXIETY DISORDER: ICD-10-CM

## 2020-06-08 DIAGNOSIS — C74.01 MALIGNANT NEOPLASM OF CORTEX OF RIGHT ADRENAL GLAND (H): ICD-10-CM

## 2020-06-08 DIAGNOSIS — J90 PLEURAL EFFUSION ON RIGHT: ICD-10-CM

## 2020-06-08 DIAGNOSIS — F33.9 MAJOR DEPRESSIVE DISORDER, RECURRENT EPISODE (H): ICD-10-CM

## 2020-06-08 LAB
ALBUMIN SERPL-MCNC: 3.1 G/DL (ref 3.4–5)
ALP SERPL-CCNC: 88 U/L (ref 40–150)
ALT SERPL W P-5'-P-CCNC: 22 U/L (ref 0–50)
ALT SERPL W/O P-5'-P-CCNC: 22 U/L (ref 0–50)
ANION GAP SERPL CALCULATED.3IONS-SCNC: 5 MMOL/L (ref 3–14)
AST SERPL W P-5'-P-CCNC: 16 U/L (ref 0–45)
AST SERPL-CCNC: 16 U/L (ref 0–45)
BASOPHILS # BLD AUTO: 0 10E9/L (ref 0–0.2)
BASOPHILS NFR BLD AUTO: 0.6 %
BILIRUB SERPL-MCNC: 0.3 MG/DL (ref 0.2–1.3)
BUN SERPL-MCNC: 9 MG/DL (ref 7–30)
CALCIUM SERPL-MCNC: 8.1 MG/DL (ref 8.5–10.1)
CHLORIDE SERPL-SCNC: 106 MMOL/L (ref 94–109)
CO2 SERPL-SCNC: 28 MMOL/L (ref 20–32)
CREAT SERPL-MCNC: 0.78 MG/DL (ref 0.52–1.04)
CREAT SERPL-MCNC: 0.78 MG/DL (ref 0.52–1.04)
DIFFERENTIAL METHOD BLD: NORMAL
EOSINOPHIL # BLD AUTO: 0 10E9/L (ref 0–0.7)
EOSINOPHIL NFR BLD AUTO: 0.3 %
ERYTHROCYTE [DISTWIDTH] IN BLOOD BY AUTOMATED COUNT: 14.8 % (ref 10–15)
GFR ESTIMATE EXT - HISTORICAL: >90 ML/MIN/1.73_M2
GFR ESTIMATE, IF BLACK EXT - HISTORICAL: >90 ML/MIN/1.73_M2
GFR SERPL CREATININE-BSD FRML MDRD: >90 ML/MIN/{1.73_M2}
GLUCOSE SERPL-MCNC: 89 MG/DL (ref 70–99)
HCT VFR BLD AUTO: 42.1 % (ref 35–47)
HGB BLD-MCNC: 13.5 G/DL (ref 11.7–15.7)
IMM GRANULOCYTES # BLD: 0 10E9/L (ref 0–0.4)
IMM GRANULOCYTES NFR BLD: 0.5 %
LYMPHOCYTES # BLD AUTO: 1.5 10E9/L (ref 0.8–5.3)
LYMPHOCYTES NFR BLD AUTO: 23.9 %
MCH RBC QN AUTO: 29.9 PG (ref 26.5–33)
MCHC RBC AUTO-ENTMCNC: 32.1 G/DL (ref 31.5–36.5)
MCV RBC AUTO: 93 FL (ref 78–100)
MONOCYTES # BLD AUTO: 0.4 10E9/L (ref 0–1.3)
MONOCYTES NFR BLD AUTO: 5.8 %
NEUTROPHILS # BLD AUTO: 4.3 10E9/L (ref 1.6–8.3)
NEUTROPHILS NFR BLD AUTO: 68.9 %
NRBC # BLD AUTO: 0 10*3/UL
NRBC BLD AUTO-RTO: 0 /100
PLATELET # BLD AUTO: 197 10E9/L (ref 150–450)
POTASSIUM SERPL-SCNC: 4 MMOL/L (ref 3.4–5.3)
PROT SERPL-MCNC: 6.4 G/DL (ref 6.8–8.8)
RBC # BLD AUTO: 4.51 10E12/L (ref 3.8–5.2)
SODIUM SERPL-SCNC: 139 MMOL/L (ref 133–144)
T4 FREE SERPL-MCNC: 1.06 NG/DL (ref 0.76–1.46)
TSH SERPL DL<=0.005 MIU/L-ACNC: 0.22 MU/L (ref 0.4–4)
WBC # BLD AUTO: 6.2 10E9/L (ref 4–11)

## 2020-06-08 PROCEDURE — 90853 GROUP PSYCHOTHERAPY: CPT | Mod: GT | Performed by: SOCIAL WORKER

## 2020-06-08 PROCEDURE — G0177 OPPS/PHP; TRAIN & EDUC SERV: HCPCS | Mod: GT,95

## 2020-06-08 PROCEDURE — 80053 COMPREHEN METABOLIC PANEL: CPT | Performed by: INTERNAL MEDICINE

## 2020-06-08 PROCEDURE — 84999 UNLISTED CHEMISTRY PROCEDURE: CPT | Performed by: INTERNAL MEDICINE

## 2020-06-08 PROCEDURE — 82627 DEHYDROEPIANDROSTERONE: CPT | Performed by: INTERNAL MEDICINE

## 2020-06-08 PROCEDURE — 85025 COMPLETE CBC W/AUTO DIFF WBC: CPT | Performed by: INTERNAL MEDICINE

## 2020-06-08 PROCEDURE — 84439 ASSAY OF FREE THYROXINE: CPT | Performed by: INTERNAL MEDICINE

## 2020-06-08 PROCEDURE — 84443 ASSAY THYROID STIM HORMONE: CPT | Performed by: INTERNAL MEDICINE

## 2020-06-08 PROCEDURE — 80375 DRUG/SUBSTANCE NOS 1-3: CPT | Performed by: INTERNAL MEDICINE

## 2020-06-08 RX ORDER — IOPAMIDOL 755 MG/ML
92 INJECTION, SOLUTION INTRAVASCULAR ONCE
Status: COMPLETED | OUTPATIENT
Start: 2020-06-08 | End: 2020-06-08

## 2020-06-08 RX ADMIN — IOPAMIDOL 92 ML: 755 INJECTION, SOLUTION INTRAVASCULAR at 14:15

## 2020-06-08 ASSESSMENT — PAIN SCALES - GENERAL: PAINLEVEL: NO PAIN (0)

## 2020-06-08 NOTE — NURSING NOTE
Chief Complaint   Patient presents with     Blood Draw     labs drawn with piv start by rn.  vs taken     Labs drawn with PIV start by rn.  Pt tolerated well.  VS taken.    Elina Bonilla RN

## 2020-06-08 NOTE — GROUP NOTE
Psychoeducation Group Note    PATIENT'S NAME: Suzy Rodríguez  MRN:   [id number]  :   1985  ACCT. NUMBER: [id number]  DATE OF SERVICE: 20  START TIME: 11:00 AM  END TIME: 11:50 AM  FACILITATOR: Genny Kelley RN  TOPIC: NELSON RN Group: [facility]  Telemedicine Visit: The patient's condition can be safely assessed and treated via synchronous audio and visual telemedicine encounter.      Reason for Telemedicine Visit:  COVID19    Originating Site (Patient Location): Patient's home    Distant Site (Provider Location): Provider Remote Setting    Consent:  The patient/guardian has verbally consented to: the potential risks and benefits of telemedicine (video visit) versus in person care; bill my insurance or make self-payment for services provided; and responsibility for payment of non-covered services.     Mode of Communication:  Video Conference via [employer]    As the provider I attest to compliance with applicable laws and regulations related to telemedicine.      Adult Mental Health Day Treatment  TRACK: 5A    NUMBER OF PARTICIPANTS: 4    Summary of Group / Topics Discussed:  Foundations of Health: Nutrition: Super Nutrients & Micronutrients: Super Nutrients are Foods that have high nutritional yield. Micronutrients are essential elements found in food or taken through supplements that are necessary for normal physiological functioning. This group was designed to complement the macronutrients group and build upon previous education. The changes that food makes on the brain (how the brain uses sugar) and nutrition as it relates to mental health was also discussed.       Patient Session Goals / Objectives:  ? Identified the health enhancing benefits to good nutrition  ? Verbalized ways in which the food we eat affects the brain  ? Explained the role of micronutrients in the body, how much we need, and how to get it        Patient Participation / Response:  Fully participated with the group  by sharing personal reflections / insights and openly received / provided feedback with other participants.    Verbalized understanding of foundations of health topic    Treatment Plan:  Patient has a current master individualized treatment plan.  See Epic treatment plan for more information.    Genny Kelley RN

## 2020-06-08 NOTE — GROUP NOTE
Psychotherapy Group Note    PATIENT'S NAME: Suzy Rodríguez  MRN:   [id number]  :   1985  ACCT. NUMBER: [id number]  DATE OF SERVICE: 20  START TIME: 10:00 AM  END TIME: 10:50 AM  FACILITATOR: Sulma Arshad LICSW  TOPIC:  EBP Group: Coping Skills  Adult Mental Health Day Treatment  TRACK: 5A    NUMBER OF PARTICIPANTS: 3    Summary of Group / Topics Discussed:  Coping Skills: Grounding: Patients discussed and practiced strategies to increase attachment / presence to the current moment.  Patients identified situations in which using these strategies will help improve emotion regulation sense of calm in the body.  Reviewed the benefits of applying grounding strategies, as well as past / current practices of each member.  Patients identified situations in which using these strategies would reduce stress. They developed the ability to distinguish when these strategies can be useful in their lives for management and stress and psychological well-being.    Patient Session Goals / Objectives:    Understand the purpose of using grounding strategies to reduce stress.    Verbalize understanding of how and when to apply grounding strategies to reduce distress and increase presence in the moment.    Review patients current grounding practices and discuss a more formal way of practicing and accessing skills.    Practice using various calming strategies (e.g. 5-4-3-2-1; mental and body awareness).    Choose 1-2 grounding strategies to apply during times of distress.    Telemedicine Visit: The patient's condition can be safely assessed and treated via synchronous audio and visual telemedicine encounter.      Reason for Telemedicine Visit: Services only offered telehealth    Originating Site (Patient Location): Patient's home    Distant Site (Provider Location): Provider Remote Setting    Consent:  The patient/guardian has verbally consented to: the potential risks and benefits of telemedicine (video visit)  versus in person care; bill my insurance or make self-payment for services provided; and responsibility for payment of non-covered services.     Mode of Communication:  Video Conference via [employer]    As the provider I attest to compliance with applicable laws and regulations related to telemedicine.       Patient Participation / Response:  Fully participated with the group by sharing personal reflections / insights and openly received / provided feedback with other participants.    Identified 2-3 positive coping strategies that have helped maintain / improve symptoms in the past    Treatment Plan:  Patient has a current master individualized treatment plan.  See Epic treatment plan for more information.    Sulma Arshad, TIMSW

## 2020-06-08 NOTE — GROUP NOTE
Process Group Note    PATIENT'S NAME: Suzy Rodríguez  MRN:   [id number]  :   1985  [id number]  DATE OF SERVICE: 20  START TIME:  9:00 AM  END TIME:  9:50 AM  FACILITATOR: Sulma Arshad LICSW  TOPIC:  Process Group    Diagnoses:  296.32 (F33.1) Major Depressive Disorder, Recurrent Episode, Moderate _ and With anxious distress  300.02 (F41.1) Generalized Anxiety Disorder      Adult Mental Health Day Treatment  TRACK: 5A    NUMBER OF PARTICIPANTS: 4    Telemedicine Visit: The patient's condition can be safely assessed and treated via synchronous audio and visual telemedicine encounter.      Reason for Telemedicine Visit: Services only offered telehealth    Originating Site (Patient Location): Patient's home    Distant Site (Provider Location): Provider Remote Setting    Consent:  The patient/guardian has verbally consented to: the potential risks and benefits of telemedicine (video visit) versus in person care; bill my insurance or make self-payment for services provided; and responsibility for payment of non-covered services.     Mode of Communication:  Video Conference via [employer]    As the provider I attest to compliance with applicable laws and regulations related to telemedicine.       Data:    Session content: At the start of this group, patients were invited to check in by identifying themselves, describing their current emotional status, and identifying issues to address in this group.   Area(s) of treatment focus addressed in this session included Symptom Management, Personal Safety and Community Resources/Discharge Planning.  Suzy reported poor sleep last night and over the weekend due to anxiety over the annual cancer status scan. She stated that her Mom is taking her to the scan this afternoon and her husaband is going to the follow up doctor appointment tomorrow.  She stated that she was surprised when her Mom volunteered to [employer] tomorrow so that she and  her  could have some time together after the appointment.  She stated that she attended the neighborhood gathering as planned and was able to enjoy it.  She stated that she was pressured to drink alcohol from a neighbor.  Peers offered feedback on ways to avoid peer pressure.  Client reported anxious mood, catastrophic thinking, and excessive worry.  Client denied suicidal ideation, intent and plan.     Therapeutic Interventions/Treatment Strategies:  Psychotherapist offered support, feedback and validation and reinforced use of skills. Treatment modalities used include Cognitive Behavioral Therapy. Interventions include Emotions Management:  Increased awareness of daily mood patterns/changes.    Assessment:    Patient response:   Patient responded to session by being attentive and accepting support    Possible barriers to participation / learning include: and no barriers identified    Health Issues:   Yes: cancer, Associated Psychological Distress       Substance Use Review:   Substance Use: No active concerns identified.    Mental Status/Behavioral Observations  Appearance:   Appropriate   Eye Contact:   Good   Psychomotor Behavior: Normal   Attitude:   Cooperative   Orientation:   All  Speech   Rate / Production: Normal    Volume:  Normal   Mood:    Normal  Affect:    Appropriate   Thought Content:   Clear  Thought Form:  Coherent  Logical     Insight:    Good     Plan:     Safety Plan: No current safety concerns identified.  Recommended that patient call 911 or go to the local ED should there be a change in any of these risk factors.     Barriers to treatment: None identified    Patient Contracts (see media tab):  None    Substance Use: Not addressed in session     Continue or Discharge: Patient will continue in Adult Day Treatment (ADT)  as planned. Patient is likely to benefit from learning and using skills as they work toward the goals identified in their treatment plan.      Sulma Arshad,  LICSW  June 8, 2020

## 2020-06-09 ENCOUNTER — VIRTUAL VISIT (OUTPATIENT)
Dept: ONCOLOGY | Facility: CLINIC | Age: 35
End: 2020-06-09
Attending: INTERNAL MEDICINE
Payer: COMMERCIAL

## 2020-06-09 ENCOUNTER — RECORDS - HEALTHEAST (OUTPATIENT)
Dept: ADMINISTRATIVE | Facility: OTHER | Age: 35
End: 2020-06-09

## 2020-06-09 ENCOUNTER — HOSPITAL ENCOUNTER (OUTPATIENT)
Dept: BEHAVIORAL HEALTH | Facility: CLINIC | Age: 35
End: 2020-06-09
Attending: PSYCHIATRY & NEUROLOGY
Payer: COMMERCIAL

## 2020-06-09 DIAGNOSIS — C74.01 MALIGNANT NEOPLASM OF CORTEX OF RIGHT ADRENAL GLAND (H): Primary | ICD-10-CM

## 2020-06-09 LAB
DHEA-S SERPL-MCNC: <15 UG/DL (ref 35–430)
MISCELLANEOUS TEST: NORMAL

## 2020-06-09 PROCEDURE — 99214 OFFICE O/P EST MOD 30 MIN: CPT | Mod: GT | Performed by: INTERNAL MEDICINE

## 2020-06-09 PROCEDURE — 40001009 ZZH VIDEO/TELEPHONE VISIT; NO CHARGE

## 2020-06-09 PROCEDURE — 90853 GROUP PSYCHOTHERAPY: CPT | Mod: GT,95 | Performed by: SOCIAL WORKER

## 2020-06-09 ASSESSMENT — PAIN SCALES - GENERAL: PAINLEVEL: NO PAIN (0)

## 2020-06-09 NOTE — LETTER
"    6/9/2020         RE: Suzy Rodríguez  [address]  [place] [address]        Dear Colleague,    Thank you for referring your patient, Suzy Rodríguez, to the [facility]. Please see a copy of my visit note below.    Suzy Rodríguez is a 34 year old female who is being evaluated via a billable video visit.      The patient has been notified of following:     \"This video visit will be conducted via a call between you and your physician/provider. We have found that certain health care needs can be provided without the need for an in-person physical exam.  This service lets us provide the care you need with a video conversation.  If a prescription is necessary we can send it directly to your pharmacy.  If lab work is needed we can place an order for that and you can then stop by our lab to have the test done at a later time.    Video visits are billed at different rates depending on your insurance coverage.  Please reach out to your insurance provider with any questions.    If during the course of the call the physician/provider feels a video visit is not appropriate, you will not be charged for this service.\"    Patient has given verbal consent for Video visit? Yes    How would you like to obtain your AVS? [employer]    Patient would like the video invitation sent by: Text to cell phone: [phone]    Will anyone else be joining your video visit? No      Refill on compazine.     Antonietta Hernandez CMA      Video-Visit Details  Type of service:  Video Visit  Video duration: 22 mins  Originating Location (pt. Location): Home  Distant Location (provider location):  [facility]   Platform used for Video Visit: Milton Cintron MD          MEDICAL ONCOLOGY VIRTUAL VISIT    VIDEO VISIT DUE TO COVID-19    Reason for visit: Follow-up for adrenocortical carcinoma, currently on adjuvant mitotane.    History of present illness: Ms. Rodríguez is a 34-year-old lady with history " "notable for right-sided functioning adrenocortical carcinoma (diagnosed in May 2018), who is currently on adjuvant mitotane.     Suzy has been stable since the last visit. She had developed significant, severe fatigue, slurred speech, muscle pain, insomnia, and tremors since being on the higher dose of mitotane 1500mg AM - 1500mg afternoon and 1000mg HS. These has improved quite a bit since dropping the dose to 1000mg BID and giving a brief treatment interruption. She's been on mitotane 1500mg QAM+1000 QPM on all days x 2 weeks. Also on hydrocortisone 30mg QAM, 20mg early PM.      Tremors have worsened significantly with increased dose. No worsening of mild, occasional slurred speech. Trazodone and clonazepam only partially helpful for insomnia. No more nausea with mitotane so she stopped taking compazine. Denies any fever, diarrhea, dizziness, Wt loss.     Last visit (virtual) with Dr. Hilton at [facility] was on 3/24/20 and mitotane level came back at ~10. She also follows with Dr. Veena Jaquez in our endocrinology department. No clinical evidence of disease recurrence.     Oncologic history:  1. 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   - CT abdomen and pelvis stone protocol without contrast 5 7569 showed \"9.2 x 8 cm heterogeneous right upper quadrant mass with small foci of calcification within it which is likely arising from the adrenal gland though inseparable from liver and upper pole of right kidney. It is incompletely evaluated on this noncontrast study. 3 x 2.6 cm mass right lobe of liver on image #85 also incompletely evaluated. 6 x 4 x 4 mm upper third left ureteral obstructing stone with mild to moderate secondary hydronephrosis. Collection of stones at lower pole left kidney extending over an area of approximately 6 x 7 x 4 mm. Additional " "nonobstructing 1 mm stone upper pole left kidney. 2 mm nonobstructing stone noted upper pole right kidney with no hydronephrosis on the right. Postop change consistent with gastric bypass. Noncontrast images of spleen, left adrenal gland, gallbladder, and pancreas unremarkable. No aneurysm. No adenopathy.\"  - Seen by Dr. Delvalle at [facility] clinic. Referred to Dr. Alvino Patel and then Dr. Warren Mansfield.  - Endocrinology team directed workup showed high DHEAS level of 740 pre-surgery.   - Right open adrenalectomy and hepatic mobilization performed by Dr. Warren Mansfield on 6/25/2018. Pathology showed adrenocortical carcinoma measuring 11.3 cm, weighing 413 g with extension into or through the adrenal capsule negative lymphovascular invasion, tumor necrosis present, margins involved by tumor, no regional lymph nodes identified, pathologic stage T2 NX.  pathology review reveals low grade ACC.  - DHEAS normalized postsurgery.   - Adjuvant Mitotane started on 7/24/18. Dose increased to 2000mg TID around 10/23/18 due to persistently low troughs. Held 1/16/19 for two weeks and resumed 1/30 at 1000 mg po BID due to very poor tolerance of higher doses. Highest mitotane level was 10.2 on 2/11/19.  - Saw radiation oncology at [facility], radiation oncology at [facility], as well as endocrine oncology (Dr. Huertas) at [facility].   - S/p adjuvant RT by Dr. Monsivais - 5040 cGy over 30 fr (8/24/18-10/6/18).  - 2/11/19: OSH CT C/A/P and MRI brain with contrast - no evidence of disease recurrence.   - 06/08/20: CT C/A/P with contrast - AFUA.    Laboratory Data:   Recent Labs   Lab Test 06/08/20  1352 03/30/20  1120 02/26/20  1132   WBC 6.2 5.6 7.8   RBC 4.51 4.59 4.45   HGB 13.5 12.8 11.4*   HCT 42.1 40.8 37.4   MCV 93 89 84   MCH 29.9 27.9 25.6*   MCHC 32.1 31.4* 30.5*   RDW 14.8 17.1* 17.6*    159 205   NEUTROPHIL 68.9 79.9 74.9    138 137   POTASSIUM 4.0 3.0* " 3.4   CHLORIDE 106 102 104   CO2 28 32 27   ANIONGAP 5 4 6   GLC 89 120* 83   BUN 9 6* 12   CR 0.78 0.66 0.59   SHASHA 8.1* 7.8* 7.8*   PROTTOTAL 6.4* 6.1* 5.7*   ALBUMIN 3.1* 3.0* 2.8*   BILITOTAL 0.3 0.3 0.2   ALKPHOS 88 88 89   AST 16 17 18   ALT 22 18 19   Labs from [facility] from 4/28/20 previously reviewed with pt.     Recent Labs   Lab Test 06/08/20  1352 03/30/20  1120 02/26/20  1132 01/22/20  1647 12/12/19  1715   TSH 0.22* 0.37* 0.48 0.27* 0.09*   T4 1.06 1.06 1.08 0.87 0.95     Recent Labs   Lab Test 03/30/20  1120 02/26/20  1132   CHOL 185 157    112   LDL 38 24   TRIG 173* 108     DHEAS was 740 on 6/5/18, and has been <15 on 7/12/18, 8/20/18, 9/19/18, 10/23/18, 11/27/18, 1/9/19, 5/8/19, 6/18/19, 7/17/19, 11/15/19, 12/12/19, 1/22/20, 3/30/20 and 6/8/20.  Mitotane level (target 14-20): 1.8ng/dl on 8/20/18, 3.5 on 10/25/18, 6.2 on 12/4/18, 8.1 on 1/9/19 and 10.2 on 2/11/19, 14.2 on 6/18/19. 10.8 on 12/12/19.  10 on 1/24/20. 8.9 on 5/14/20. Pending from this week.  Labs from [facility] 9/24/19 - WBC 4200, ANC 2600, Hb 10.4, Platelets 218K, lytes WNL, creat 0.61, Calcium 8, albumin 3.5, LFTs normal, mitotane level 18.8, cortisol 25.8, ACTH<5, aldosterone 4.9, free T4 1.17, TSH 0.07, DHEAS <15, renin plasma 10.6.     Assessment/Plan: A 34-year-old lady with right-sided functioning adrenocortical carcinoma.     Adrenocortical carcinoma:  - Started on adjuvant mitotane in July 2018 based on her presentation and tumor characteristics including invasion of the adrenal capsule, high mitotic rate, possibly positive margins, and high Ki67, which could result in a rate of recurrence as high as 40%.    - Restaging CT C/A/P 6/8/20 in detail showed no evidence of disease recurrence.  - She's 2 years out from surgery and explained that this is an excellent finding as recurrence rates drop quite a bit after year 2.  - Despite multiple supportive care interventions, patient has NOT been able to tolerate higher doses (above  2-3gm/day) of mitotane.   - Insisting to continue mitotane 1500mg QAM+1000 QPM despite tremors and other side effects. Advised to monitor closely. Will take ~2-3 months of this dose before the mitotane trough is accurate.  - Monthly Mitotane level, CBC, comprehensive panel, TSH, FT4, and DHEAS, and periodic cholesterol panel, 24-hour Urine Free Cortisol.   - Plan to continue adjuvant Mitotane for up to 5 yrs if tolerated.  - Next restaging CT C/A/P in ~4 months right before a visit with Dr. Huertas in early Oct 2020.     Hypocalcemia, mild, resolved:  - Continue 2 calcium tablets daily (1000 mg every day).    Endocrinopathies:  - Mgmt per endocrine team at the . Follow-up planned in the next couple months.    Insomnia:  - Trazodone is working well but she's taking 150mg at bedtime. Further mgmt per PCP.   - EKG with QTc 488 ms in 2019. Will repeat on return visit.     Return to see DARLYN in a month and then me in 2 months.     BILLIN. 22 mins video visit; complex medical decision making; coordination of care with another institution     Benson Cintron M.D.  . Professor of Medicine  Genitourinary Oncology  Division of Hematology, Oncology & Trans[facility]

## 2020-06-09 NOTE — ADDENDUM NOTE
Encounter addended by: Sulma Arshad, [facility] on: 6/9/2020 4:04 PM   Actions taken: Charge Capture section accepted

## 2020-06-09 NOTE — PROGRESS NOTES
"MEDICAL ONCOLOGY VIRTUAL VISIT    VIDEO VISIT DUE TO COVID-19    Reason for visit: Follow-up for adrenocortical carcinoma, currently on adjuvant mitotane.    History of present illness: Ms. 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     Suzy has been stable since the last visit. She had developed significant, severe fatigue, slurred speech, muscle pain, insomnia, and tremors since being on the higher dose of mitotane 1500mg AM - 1500mg afternoon and 1000mg HS. These has improved quite a bit since dropping the dose to 1000mg BID and giving a brief treatment interruption. She's been on mitotane 1500mg QAM+1000 QPM on all days x 2 weeks. Also on hydrocortisone 30mg QAM, 20mg early PM.      Tremors have worsened significantly with increased dose. No worsening of mild, occasional slurred speech. Trazodone and clonazepam only partially helpful for insomnia. No more nausea with mitotane so she stopped taking compazine. Denies any fever, diarrhea, dizziness, Wt loss.     Last visit (virtual) with Dr. Hilton at [facility] was on 3/24/20 and mitotane level came back at ~10. She also follows with Dr. Veena Jaquez in our endocrinology department. No clinical evidence of disease recurrence.     Oncologic history:  1. 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   - CT abdomen and pelvis stone protocol without contrast 5 5088 showed \"9.2 x 8 cm heterogeneous right upper quadrant mass with small foci of calcification within it which is likely arising from the adrenal gland though inseparable from liver and upper pole of right kidney. It is incompletely evaluated on this noncontrast study. 3 x 2.6 cm mass right lobe of liver on image #85 also " "incompletely evaluated. 6 x 4 x 4 mm upper third left ureteral obstructing stone with mild to moderate secondary hydronephrosis. Collection of stones at lower pole left kidney extending over an area of approximately 6 x 7 x 4 mm. Additional nonobstructing 1 mm stone upper pole left kidney. 2 mm nonobstructing stone noted upper pole right kidney with no hydronephrosis on the right. Postop change consistent with gastric bypass. Noncontrast images of spleen, left adrenal gland, gallbladder, and pancreas unremarkable. No aneurysm. No adenopathy.\"  - Seen by Dr. Delvalle at [facility] clinic. Referred to Dr. Alvino Patel and then Dr. Warren Mansfield.  - Endocrinology team directed workup showed high DHEAS level of 740 pre-surgery.   - Right open adrenalectomy and hepatic mobilization performed by Dr. Warren Mansfield on 6/25/2018. Pathology showed adrenocortical carcinoma measuring 11.3 cm, weighing 413 g with extension into or through the adrenal capsule negative lymphovascular invasion, tumor necrosis present, margins involved by tumor, no regional lymph nodes identified, pathologic stage T2 NX.  pathology review reveals low grade ACC.  - DHEAS normalized postsurgery.   - Adjuvant Mitotane started on 7/24/18. Dose increased to 2000mg TID around 10/23/18 due to persistently low troughs. Held 1/16/19 for two weeks and resumed 1/30 at 1000 mg po BID due to very poor tolerance of higher doses. Highest mitotane level was 10.2 on 2/11/19.  - Saw radiation oncology at [facility], radiation oncology at [facility], as well as endocrine oncology (Dr. Huertas) at [facility].   - S/p adjuvant RT by Dr. Monsivais - 5040 cGy over 30 fr (8/24/18-10/6/18).  - 2/11/19: OSH CT C/A/P and MRI brain with contrast - no evidence of disease recurrence.   - 06/08/20: CT C/A/P with contrast - AFUA.    Laboratory Data:   Recent Labs   Lab Test 06/08/20  1352 03/30/20  1120 02/26/20  1132   WBC 6.2 " 5.6 7.8   RBC 4.51 4.59 4.45   HGB 13.5 12.8 11.4*   HCT 42.1 40.8 37.4   MCV 93 89 84   MCH 29.9 27.9 25.6*   MCHC 32.1 31.4* 30.5*   RDW 14.8 17.1* 17.6*    159 205   NEUTROPHIL 68.9 79.9 74.9    138 137   POTASSIUM 4.0 3.0* 3.4   CHLORIDE 106 102 104   CO2 28 32 27   ANIONGAP 5 4 6   GLC 89 120* 83   BUN 9 6* 12   CR 0.78 0.66 0.59   SHASHA 8.1* 7.8* 7.8*   PROTTOTAL 6.4* 6.1* 5.7*   ALBUMIN 3.1* 3.0* 2.8*   BILITOTAL 0.3 0.3 0.2   ALKPHOS 88 88 89   AST 16 17 18   ALT 22 18 19   Labs from [facility] from 4/28/20 previously reviewed with pt.     Recent Labs   Lab Test 06/08/20  1352 03/30/20  1120 02/26/20  1132 01/22/20  1647 12/12/19  1715   TSH 0.22* 0.37* 0.48 0.27* 0.09*   T4 1.06 1.06 1.08 0.87 0.95     Recent Labs   Lab Test 03/30/20  1120 02/26/20  1132   CHOL 185 157    112   LDL 38 24   TRIG 173* 108     DHEAS was 740 on 6/5/18, and has been <15 on 7/12/18, 8/20/18, 9/19/18, 10/23/18, 11/27/18, 1/9/19, 5/8/19, 6/18/19, 7/17/19, 11/15/19, 12/12/19, 1/22/20, 3/30/20 and 6/8/20.  Mitotane level (target 14-20): 1.8ng/dl on 8/20/18, 3.5 on 10/25/18, 6.2 on 12/4/18, 8.1 on 1/9/19 and 10.2 on 2/11/19, 14.2 on 6/18/19. 10.8 on 12/12/19.  10 on 1/24/20. 8.9 on 5/14/20. Pending from this week.  Labs from [facility] 9/24/19 - WBC 4200, ANC 2600, Hb 10.4, Platelets 218K, lytes WNL, creat 0.61, Calcium 8, albumin 3.5, LFTs normal, mitotane level 18.8, cortisol 25.8, ACTH<5, aldosterone 4.9, free T4 1.17, TSH 0.07, DHEAS <15, renin plasma 10.6.     Assessment/Plan: A 34-year-old lady with right-sided functioning adrenocortical carcinoma.     Adrenocortical carcinoma:  - Started on adjuvant mitotane in July 2018 based on her presentation and tumor characteristics including invasion of the adrenal capsule, high mitotic rate, possibly positive margins, and high Ki67, which could result in a rate of recurrence as high as 40%.    - Restaging CT C/A/P 6/8/20 in detail showed no evidence of disease  recurrence.  - She's 2 years out from surgery and explained that this is an excellent finding as recurrence rates drop quite a bit after year 2.  - Despite multiple supportive care interventions, patient has NOT been able to tolerate higher doses (above 2-3gm/day) of mitotane.   - Insisting to continue mitotane 1500mg QAM+1000 QPM despite tremors and other side effects. Advised to monitor closely. Will take ~2-3 months of this dose before the mitotane trough is accurate.  - Monthly Mitotane level, CBC, comprehensive panel, TSH, FT4, and DHEAS, and periodic cholesterol panel, 24-hour Urine Free Cortisol.   - Plan to continue adjuvant Mitotane for up to 5 yrs if tolerated.  - Next restaging CT C/A/P in ~4 months right before a visit with Dr. Huertas in early Oct 2020.     Hypocalcemia, mild, resolved:  - Continue 2 calcium tablets daily (1000 mg every day).    Endocrinopathies:  - Mgmt per endocrine team at the . Follow-up planned in the next couple months.    Insomnia:  - Trazodone is working well but she's taking 150mg at bedtime. Further mgmt per PCP.   - EKG with QTc 488 ms in 2019. Will repeat on return visit.     Return to see DARLYN in a month and then me in 2 months.     BILLIN. 22 mins video visit; complex medical decision making; coordination of care with another institution     Benson Cintron M.D.  . Professor of Medicine  Genitourinary Oncology  Division of Hematology, Oncology & Transplantation  [facility]

## 2020-06-09 NOTE — GROUP NOTE
Process Group Note    PATIENT'S NAME: Suzy Rodríguez  MRN:   [id number]  :   1985  [id number]  DATE OF SERVICE: 20  START TIME: 9:00 AM  END TIME: 9:50 AM  FACILITATOR: Sulma Arshad LICSW  TOPIC:  Process Group    Diagnoses:  296.32 (F33.1) Major Depressive Disorder, Recurrent Episode, Moderate _ and With anxious distress  300.02 (F41.1) Generalized Anxiety Disorder      Adult Mental Health Day Treatment  TRACK: 5A    NUMBER OF PARTICIPANTS: 3    Telemedicine Visit: The patient's condition can be safely assessed and treated via synchronous audio and visual telemedicine encounter.      Reason for Telemedicine Visit: Services only offered telehealth    Originating Site (Patient Location): Patient's home    Distant Site (Provider Location): Provider Remote Setting    Consent:  The patient/guardian has verbally consented to: the potential risks and benefits of telemedicine (video visit) versus in person care; bill my insurance or make self-payment for services provided; and responsibility for payment of non-covered services.     Mode of Communication:  Video Conference via teextee    As the provider I attest to compliance with applicable laws and regulations related to telemedicine.       Data:    Session content: At the start of this group, patients were invited to check in by identifying themselves, describing their current emotional status, and identifying issues to address in this group.   Area(s) of treatment focus addressed in this session included Symptom Management, Personal Safety and Community Resources/Discharge Planning.  Suzy reported having a positive time with her Mom when she drove client to and from thethe annual scan to assess the cancer.  She stated that the scan went fine and she will get results from her oncologist today.  She shared that her Mom will babysit the kids this evening so that Suzy and her  can have some time to talk and have  some dinner.  She stated that her anxiety level is very high but did not need any additional coping resources at this time.  Client denied suicidal ideation, intent and plan.     Therapeutic Interventions/Treatment Strategies:  Psychotherapist offered support, feedback and validation and reinforced use of skills. Treatment modalities used include Cognitive Behavioral Therapy. Interventions include Coping Skills: Discussed use of self-soothe skills to decrease distress in the body.    Assessment:    Patient response:   Patient responded to session by focusing on goals and being attentive    Possible barriers to participation / learning include: and no barriers identified    Health Issues:   Yes: cancer, Associated Psychological Distress       Substance Use Review:   Substance Use: No active concerns identified.    Mental Status/Behavioral Observations  Appearance:   Appropriate   Eye Contact:   Good   Psychomotor Behavior: Normal   Attitude:   Cooperative   Orientation:   All  Speech   Rate / Production: Normal    Volume:  Normal   Mood:    Anxious  Depressed   Affect:    Appropriate   Thought Content:   Clear  Thought Form:  Coherent  Logical     Insight:    Good     Plan:     Safety Plan: No current safety concerns identified.  Recommended that patient call 911 or go to the local ED should there be a change in any of these risk factors.     Barriers to treatment: None identified    Patient Contracts (see media tab):  None    Substance Use: Not addressed in session     Continue or Discharge: Patient will continue in Adult Day Treatment (ADT)  as planned. Patient is likely to benefit from learning and using skills as they work toward the goals identified in their treatment plan.      Sulma Arshad, [facility]  June 9, 2020

## 2020-06-09 NOTE — PROGRESS NOTES
"Suzy Rodríguez is a 34 year old female who is being evaluated via a billable video visit.      The patient has been notified of following:     \"This video visit will be conducted via a call between you and your physician/provider. We have found that certain health care needs can be provided without the need for an in-person physical exam.  This service lets us provide the care you need with a video conversation.  If a prescription is necessary we can send it directly to your pharmacy.  If lab work is needed we can place an order for that and you can then stop by our lab to have the test done at a later time.    Video visits are billed at different rates depending on your insurance coverage.  Please reach out to your insurance provider with any questions.    If during the course of the call the physician/provider feels a video visit is not appropriate, you will not be charged for this service.\"    Patient has given verbal consent for Video visit? Yes    How would you like to obtain your AVS? [facility]    Patient would like the video invitation sent by: Text to cell phone: [phone]    Will anyone else be joining your video visit? No      Refill on compazine.     Antonietta Hernandez CMA      Video-Visit Details  Type of service:  Video Visit  Video duration: 22 mins  Originating Location (pt. Location): Home  Distant Location (provider location):  [facility]   Platform used for Video Visit: Milton Cintron MD        "

## 2020-06-10 ENCOUNTER — COMMUNICATION - HEALTHEAST (OUTPATIENT)
Dept: BEHAVIORAL HEALTH | Facility: CLINIC | Age: 35
End: 2020-06-10

## 2020-06-11 ENCOUNTER — HOSPITAL ENCOUNTER (OUTPATIENT)
Dept: BEHAVIORAL HEALTH | Facility: CLINIC | Age: 35
End: 2020-06-11
Attending: PSYCHIATRY & NEUROLOGY
Payer: COMMERCIAL

## 2020-06-11 PROCEDURE — 90853 GROUP PSYCHOTHERAPY: CPT | Mod: GT | Performed by: SOCIAL WORKER

## 2020-06-11 PROCEDURE — 90853 GROUP PSYCHOTHERAPY: CPT | Mod: GT,95 | Performed by: SOCIAL WORKER

## 2020-06-11 PROCEDURE — 90853 GROUP PSYCHOTHERAPY: CPT | Mod: GT

## 2020-06-11 NOTE — GROUP NOTE
Psychotherapy Group Note    PATIENT'S NAME: Suzy Rodríguez  MRN:   [id number]  :   1985  ACCT. NUMBER: [id number]  DATE OF SERVICE: 20  START TIME: 10:00 AM  END TIME: 10:50 AM  FACILITATOR: Sulma Arshad LICSW  TOPIC: MH EBP Group: Self-Awareness  Adult Mental Health Day Treatment  TRACK: 5A    NUMBER OF PARTICIPANTS: 4    Summary of Group / Topics Discussed:  Self-Awareness: Gratitude: Topic focused on assisting patients in identifying key concepts in gratitude. Patients discussed the benefits of practicing gratitude and its impact on mood improvement, mindfulness, and perspective. Patients worked to increase time spent on recognition and appreciation of what is positive and working in their lives. Patients discussed the concepts and benefits of feeling grateful. The goal is to reduce rumination and negative thinking resulting in increased mindfulness and resilience. Patients specifically discussed how they can practice and problem solve barriers to daily gratitude practice.     Patient Session Goals / Objectives:    Butner the concept and benefits of gratitude    Identified ways to practice gratitude in daily life    Problem solved barriers to practicing gratitude    Telemedicine Visit: The patient's condition can be safely assessed and treated via synchronous audio and visual telemedicine encounter.      Reason for Telemedicine Visit: Services only offered telehealth    Originating Site (Patient Location): Patient's home    Distant Site (Provider Location): Provider Remote Setting    Consent:  The patient/guardian has verbally consented to: the potential risks and benefits of telemedicine (video visit) versus in person care; bill my insurance or make self-payment for services provided; and responsibility for payment of non-covered services.     Mode of Communication:  Video Conference via NinthDecimal    As the provider I attest to compliance with applicable laws and regulations  related to telemedicine.       Patient Participation / Response:  Fully participated with the group by sharing personal reflections / insights and openly received / provided feedback with other participants.    Demonstrated understanding of values, strengths, and challenges to learn about themselves    Treatment Plan:  Patient has a current master individualized treatment plan.  See Epic treatment plan for more information.    Sulma Arshad, LICSW

## 2020-06-11 NOTE — GROUP NOTE
Psychotherapy Group Note    PATIENT'S NAME: Suzy Rodríguez  MRN:   [id number]  [facility]   1985  [id number]  DATE OF SERVICE: 20  START TIME: 11:00 AM  END TIME: 11:50 AM  FACILITATOR: Antoinette Morton PsyD, LP  TOPIC: MH EBP Group: Coping Skills  Telemedicine Visit: The patient's condition can be safely assessed and treated via synchronous audio and visual telemedicine encounter.      Reason for Telemedicine Visit:  COVID19    Originating Site (Patient Location): Patient's home    Distant Site (Provider Location): Provider Remote Setting    Consent:  The patient/guardian has verbally consented to: the potential risks and benefits of telemedicine (video visit) versus in person care; bill my insurance or make self-payment for services provided; and responsibility for payment of non-covered services.     Mode of Communication:  Video Conference via NextInput    As the provider I attest to compliance with applicable laws and regulations related to telemedicine.     Adult Mental Health Day Treatment  TRACK: 5A    NUMBER OF PARTICIPANTS: 4    Summary of Group / Topics Discussed:  Coping Skills: Calming Strategies: Patients discussed and practiced strategies to increase sense of calm in the body.  Reviewed the benefits of body scans as well as past / current practices of each member.  Patients identified situations in which using these strategies would reduce stress. They developed the ability to distinguish when these strategies can be useful in their lives for management and stress and psychological well-being.    Patient Session Goals / Objectives:    Understand the purpose of using calming strategies to reduce stress    Review patients current calming practices and discuss a more formal way of practicing and accessing skills.    Increase ability to decide when to use various calming strategies (e.g. Progressive muscle relaxation, deep breathing, guided imagery, + thoughts).    Choose  1-2 calming strategies to apply during times of distress.        Patient Participation / Response:  Fully participated with the group by sharing personal reflections / insights and openly received / provided feedback with other participants.    Practiced a body scan    Treatment Plan:  Patient has a current master individualized treatment plan.  See Epic treatment plan for more information.    Antoinette Morton PsyD, LP    6/11/20

## 2020-06-11 NOTE — GROUP NOTE
Process Group Note    PATIENT'S NAME: Suzy Rodríguez  MRN:   [id number]  [facility]   1985  [id number]  DATE OF SERVICE: 20  START TIME:  9:00 AM  END TIME:  9:50 AM  FACILITATOR: Sulma Arshad LICSW  TOPIC: MH Process Group    Diagnoses:  296.32 (F33.1) Major Depressive Disorder, Recurrent Episode, Moderate _ and With anxious distress  300.02 (F41.1) Generalized Anxiety Disorder      Adult Mental Health Day Treatment  TRACK: 5A    NUMBER OF PARTICIPANTS: 4    Telemedicine Visit: The patient's condition can be safely assessed and treated via synchronous audio and visual telemedicine encounter.      Reason for Telemedicine Visit: Services only offered telehealth    Originating Site (Patient Location): Patient's home    Distant Site (Provider Location): Provider Remote Setting    Consent:  The patient/guardian has verbally consented to: the potential risks and benefits of telemedicine (video visit) versus in person care; bill my insurance or make self-payment for services provided; and responsibility for payment of non-covered services.     Mode of Communication:  Video Conference via evolso    As the provider I attest to compliance with applicable laws and regulations related to telemedicine.           Data:    Session content: At the start of this group, patients were invited to check in by identifying themselves, describing their current emotional status, and identifying issues to address in this group.   Area(s) of treatment focus addressed in this session included Symptom Management, Personal Safety and Community Resources/Discharge Planning.  Suzy reported that her cancer scan was clear.  She is free of disease at this time.  She stated that she can go to longer times between evaluations.  She stated that she is a bit nervous about that change, but can discuss further with her oncologist as needed.  She stated that she enjoyed a celebratory meal with her . Client  denied suicidal ideation, intent and plan.     Therapeutic Interventions/Treatment Strategies:  Psychotherapist offered support, feedback and validation and reinforced use of skills. Treatment modalities used include Cognitive Behavioral Therapy. Interventions include Behavioral Activation: Explored how behaviors effect mood and interact with thoughts and feelings.    Assessment:    Patient response:   Patient responded to session by focusing on goals and being attentive    Possible barriers to participation / learning include: and no barriers identified    Health Issues:   None reported       Substance Use Review:   Substance Use: No active concerns identified.    Mental Status/Behavioral Observations  Appearance:   Appropriate   Eye Contact:   Good   Psychomotor Behavior: Normal   Attitude:   Cooperative   Orientation:   All  Speech   Rate / Production: Normal    Volume:  Normal   Mood:    Anxious  Normal  Affect:    Appropriate   Thought Content:   Clear  Thought Form:  Coherent  Logical     Insight:    Good     Plan:     Safety Plan: No current safety concerns identified.  Recommended that patient call 911 or go to the local ED should there be a change in any of these risk factors.     Barriers to treatment: None identified    Patient Contracts (see media tab):  None    Substance Use: Not addressed in session     Continue or Discharge: Patient will continue in Adult Day Treatment (ADT)  as planned. Patient is likely to benefit from learning and using skills as they work toward the goals identified in their treatment plan.      Sulma Arshad, [facility]  June 11, 2020

## 2020-06-13 RX ORDER — DIPHENHYDRAMINE HYDROCHLORIDE 50 MG/ML
50 INJECTION INTRAMUSCULAR; INTRAVENOUS
Status: CANCELLED
Start: 2020-06-25

## 2020-06-13 RX ORDER — ALBUTEROL SULFATE 0.83 MG/ML
2.5 SOLUTION RESPIRATORY (INHALATION)
Status: CANCELLED | OUTPATIENT
Start: 2020-06-25

## 2020-06-13 RX ORDER — SODIUM CHLORIDE 9 MG/ML
1000 INJECTION, SOLUTION INTRAVENOUS CONTINUOUS PRN
Status: CANCELLED
Start: 2020-06-25

## 2020-06-13 RX ORDER — ALBUTEROL SULFATE 90 UG/1
1-2 AEROSOL, METERED RESPIRATORY (INHALATION)
Status: CANCELLED
Start: 2020-06-25

## 2020-06-13 RX ORDER — EPINEPHRINE 0.3 MG/.3ML
0.3 INJECTION SUBCUTANEOUS EVERY 5 MIN PRN
Status: CANCELLED | OUTPATIENT
Start: 2020-06-25

## 2020-06-13 RX ORDER — EPINEPHRINE 1 MG/ML
0.3 INJECTION, SOLUTION, CONCENTRATE INTRAVENOUS EVERY 5 MIN PRN
Status: CANCELLED | OUTPATIENT
Start: 2020-06-25

## 2020-06-13 RX ORDER — MEPERIDINE HYDROCHLORIDE 25 MG/ML
25 INJECTION INTRAMUSCULAR; INTRAVENOUS; SUBCUTANEOUS EVERY 30 MIN PRN
Status: CANCELLED | OUTPATIENT
Start: 2020-06-25

## 2020-06-15 ENCOUNTER — HOSPITAL ENCOUNTER (OUTPATIENT)
Dept: BEHAVIORAL HEALTH | Facility: CLINIC | Age: 35
End: 2020-06-15
Attending: PSYCHIATRY & NEUROLOGY
Payer: COMMERCIAL

## 2020-06-15 LAB — LAB SCANNED RESULT: NORMAL

## 2020-06-15 PROCEDURE — 90853 GROUP PSYCHOTHERAPY: CPT | Mod: GT,95

## 2020-06-15 PROCEDURE — 90853 GROUP PSYCHOTHERAPY: CPT | Mod: GT | Performed by: COUNSELOR

## 2020-06-15 PROCEDURE — G0177 OPPS/PHP; TRAIN & EDUC SERV: HCPCS | Mod: GT | Performed by: OCCUPATIONAL THERAPIST

## 2020-06-15 NOTE — GROUP NOTE
Psychotherapy Group Note    PATIENT'S NAME: Suzy Rodríguez  MRN:   [id number]  :   1985  ACCT. NUMBER: [id number]  DATE OF SERVICE: 6/15/20  START TIME: 11:00 AM  END TIME: 11:50 AM  FACILITATOR: Radhika Stevenson LICSW  TOPIC: MH EBP Group: Specialty Awareness  Adult Mental Health Day Treatment  TRACK: 5A  Telemedicine Visit: The patient's condition can be safely assessed and treated via synchronous audio and visual telemedicine encounter.      Reason for Telemedicine Visit: Services only offered telehealth    Originating Site (Patient Location): Patient's home    Distant Site (Provider Location): [facility]: [facility]    Consent:  The patient/guardian has verbally consented to: the potential risks and benefits of telemedicine (video visit) versus in person care; bill my insurance or make self-payment for services provided; and responsibility for payment of non-covered services.     Mode of Communication:  Video Conference via Keko    As the provider I attest to compliance with applicable laws and regulations related to telemedicine.     NUMBER OF PARTICIPANTS: 3    Summary of Group / Topics Discussed:  Specialty Topics: Life Transitions: The topic of life transitions was presented in order to help patients to better understand the challenges presented by life transitions, and how to best navigate them. Exploring the phases of transition and how one works through them was discussed. Patients were provided with information regarding community resources.     Patient Session Goals / Objectives:    Discussed the timing and nature of major life transitions    Explored how life transitions may impact mental health and functioning    Discussed coping strategies to manage symptoms and help with transitioning    Discussed and planned a successful transition        Patient Participation / Response:  Moderately participated, sharing some personal reflections / insights and adequately  adequately received / provided feedback with other participants.    Demonstrated understanding of topics discussed through group discussion and participation and Identified / Expressed readiness to act on skill suggestions discussed in topic    Treatment Plan:  Patient has a current master individualized treatment plan.  See Epic treatment plan for more information.    Radhika Johnson, TIM[facility]

## 2020-06-15 NOTE — GROUP NOTE
Psychoeducation Group Note    PATIENT'S NAME: Suzy Rodríguez  MRN:   [id number]  :   1985  ACCT. NUMBER: [id number]  DATE OF SERVICE: 6/15/20  START TIME: 10:00 AM  END TIME: 10:50 AM  FACILITATOR: Victorina Stevenson OTR/L  TOPIC: MH RN Group: Mental Health Maintenance  Adult Mental Health Day Treatment  TRACK: 5A    NUMBER OF PARTICIPANTS: 4    Telemedicine Visit: The patient's condition can be safely assessed and treated via synchronous audio and visual telemedicine encounter.      Reason for Telemedicine Visit: Services only offered telehealth    Originating Site (Patient Location): Patient's home    Distant Site (Provider Location): [facility] Outpatient Setting: Adult Day Treatment, [facility]    Consent:  The patient/guardian has verbally consented to: the potential risks and benefits of telemedicine (video visit) versus in person care; bill my insurance or make self-payment for services provided; and responsibility for payment of non-covered services.     Mode of Communication:  Video Conference via [employer]    As the provider I attest to compliance with applicable laws and regulations related to telemedicine.     Summary of Group / Topics Discussed:  Mental Health Maintenance:  Stress Reduction Journal: Patients identified a current stressor they are dealing with, signs of stress both physically, emotionally and behaviorally, and coping skills to help better manage stress. The group shared examples of stressors and coping skills used.  They were taught additional stress management skills they might consider using to better manage stress.      Patient Session Goals / Objectives:  ? Patients participated in discussion on stress management   ? Patients identified stressors they are dealing with and 1-2 coping skills they want to implement more regularly            Patient Participation / Response:  Fully participated with the group by sharing personal reflections / insights and openly received  / provided feedback with other participants.    Demonstrated understanding of topics discussed through group discussion and participation, Identified / Expressed personal readiness to practice skills and Verbalized understanding of mental health maintenance topic    Treatment Plan:  Patient has a current master individualized treatment plan.  See Epic treatment plan for more information.    Victorina Stevenson, OTR/L

## 2020-06-15 NOTE — GROUP NOTE
"Process Group Note    PATIENT'S NAME: Suzy Rodríguez  MRN:   [id number]  :   1985  ACCT. NUMBER: [id number]  DATE OF SERVICE: 6/15/20  START TIME:  9:00 AM  END TIME:  9:50 AM  FACILITATOR: Shaina Leon LPCC  TOPIC:  Process Group    Diagnoses:  296.32 (F33.1) Major Depressive Disorder, Recurrent Episode, Moderate _ and With anxious distress  300.02 (F41.1) Generalized Anxiety Disorder       Adult Mental Health Day Treatment  TRACK: 5A    NUMBER OF PARTICIPANTS: 4    Telemedicine Visit: The patient's condition can be safely assessed and treated via synchronous audio and visual telemedicine encounter.      Reason for Telemedicine Visit: Services only offered telehealth and due to COVID-19    Originating Site (Patient Location): Patient's home    Distant Site (Provider Location): Provider Remote Setting    Consent:  The patient/guardian has verbally consented to: the potential risks and benefits of telemedicine (video visit) versus in person care; bill my insurance or make self-payment for services provided; and responsibility for payment of non-covered services.     Mode of Communication:  Video Conference via [employer]    As the provider I attest to compliance with applicable laws and regulations related to telemedicine.            Data:    Session content: At the start of this group, patients were invited to check in by identifying themselves, describing their current emotional status, and identifying issues to address in this group.   Area(s) of treatment focus addressed in this session included Symptom Management, Personal Safety and Community Resources/Discharge Planning.    Patient reported feeling \"good after an uneventful weekend.\" Patient discussed working toward following-up with job applications. Patient identified utilizing her support system as skills they will use to address their goal(s). Patient reported feeling tired from chemotherapy treatments may be a barrier to working " "toward their goal(s) and/or addressing mental health symptoms. Patient reported no safety concerns and/or self-injurious behaviors. Patient reported no substance use. Patient reported they are taking their medications as prescribed. Patient reported feeling proud that they \"coped well\" when her  bought a four-mendoza and her son was driving it by himself. Patient discussed applying for two jobs over the weekend with the treatment group.     Therapeutic Interventions/Treatment Strategies:  Psychotherapist offered support, feedback and validation and reinforced use of skills. Treatment modalities used include Motivational Interviewing and Cognitive Behavioral Therapy. Interventions include Coping Skills: Discussed use of self-soothe skills to decrease distress in the body and Assisted patient in identifying 1-2 healthy distraction skills to reduce overall distress, Mindfulness: Facilitated discussion of when/how to use mindfulness skills to benefit general health, mental health symptoms, and stressors and Symptoms Management: Promoted understanding of their diagnoses and how it impacts their functioning.    Assessment:    Patient response:   Patient responded to session by accepting feedback, giving feedback, listening, focusing on goals and being attentive    Possible barriers to participation / learning include: and no barriers identified    Health Issues:   Yes: currently receiving chemotherapy treatment        Substance Use Review:   Substance Use: No active concerns identified.    Mental Status/Behavioral Observations  Appearance:   Appropriate   Eye Contact:   Good   Psychomotor Behavior: Normal   Attitude:   Cooperative   Orientation:   All  Speech   Rate / Production: Normal/ Responsive Normal    Volume:  Normal   Mood:    Anxious  Normal  Affect:    Appropriate   Thought Content:   Clear  Thought Form:  Coherent  Logical     Insight:    Good     Plan:     Safety Plan: No current safety concerns " identified.  Recommended that patient call 911 or go to the local ED should there be a change in any of these risk factors.     Barriers to treatment: None identified    Patient Contracts (see media tab):  None    Substance Use: Provided encouragement towards sobriety     Continue or Discharge: Patient will continue in Adult Day Treatment (ADT)  as planned. Patient is likely to benefit from learning and using skills as they work toward the goals identified in their treatment plan.      Shaina Leon, [facility]  Oliva 15, 2020

## 2020-06-16 ENCOUNTER — TELEPHONE (OUTPATIENT)
Dept: BEHAVIORAL HEALTH | Facility: CLINIC | Age: 35
End: 2020-06-16

## 2020-06-16 ENCOUNTER — OFFICE VISIT - HEALTHEAST (OUTPATIENT)
Dept: BEHAVIORAL HEALTH | Facility: CLINIC | Age: 35
End: 2020-06-16

## 2020-06-16 ENCOUNTER — HOSPITAL ENCOUNTER (OUTPATIENT)
Dept: BEHAVIORAL HEALTH | Facility: CLINIC | Age: 35
End: 2020-06-16
Attending: PSYCHIATRY & NEUROLOGY
Payer: COMMERCIAL

## 2020-06-16 DIAGNOSIS — F33.1 MAJOR DEPRESSIVE DISORDER, RECURRENT EPISODE, MODERATE (H): ICD-10-CM

## 2020-06-16 DIAGNOSIS — F41.1 GENERALIZED ANXIETY DISORDER: ICD-10-CM

## 2020-06-16 PROCEDURE — 90853 GROUP PSYCHOTHERAPY: CPT | Mod: GT

## 2020-06-16 PROCEDURE — 90853 GROUP PSYCHOTHERAPY: CPT | Mod: GT,95

## 2020-06-16 NOTE — GROUP NOTE
Process Group Note    PATIENT'S NAME: Suzy Rodríguez  MRN:   [id number]  :   1985  [id number]  DATE OF SERVICE: 20  START TIME:  9:00 AM  END TIME:  9:50 AM  FACILITATOR: Antoinette Morton PsyD, LP  TOPIC:  Process Group    Diagnoses:  296.32 (F33.1) Major Depressive Disorder, Recurrent Episode, Moderate _ and With anxious distress  300.02 (F41.1) Generalized Anxiety Disorder    Telemedicine Visit: The patient's condition can be safely assessed and treated via synchronous audio and visual telemedicine encounter.      Reason for Telemedicine Visit: COVID19    Originating Site (Patient Location): Patient's home    Distant Site (Provider Location): Provider Remote Setting    Consent:  The patient/guardian has verbally consented to: the potential risks and benefits of telemedicine (video visit) versus in person care; bill my insurance or make self-payment for services provided; and responsibility for payment of non-covered services.     Mode of Communication:  Video Conference via [employer]    As the provider I attest to compliance with applicable laws and regulations related to telemedicine.     Adult Mental Health Day Treatment  TRACK: 5A    NUMBER OF PARTICIPANTS: 3          Data:    Session content: At the start of this group, patients were invited to check in by identifying themselves, describing their current emotional status, and identifying issues to address in this group.   Area(s) of treatment focus addressed in this session included Symptom Management, Personal Safety and Community Resources/Discharge Planning.    The patient reported feeling content today. They set a goal to send out resumes and further make decisions about social work graduate school. Skills they thought would be beneficial to reach their goal included computer skills, deep breathing, and mindfulness. Identified barriers to meeting their goal(s) included motivation issues. Regarding safety, the  patient denied having safety concerns. They reported using medical marijuana to manage cancer pain since the last check in. Further, they endorsed taking medications as prescribed. They were proud of themselves for getting the opportunity to cuddle with their son prior to group. They declined additional process time but contributed to group discussion and provided feedback and support to peers.    Therapeutic Interventions/Treatment Strategies:  Psychotherapist offered support, feedback and validation and reinforced use of skills. Treatment modalities used include Cognitive Behavioral Therapy and Person-Centered Therapy. Interventions include Coping Skills: Educated about 4X4 breathing and Other: support, validation, empathy, and normalization.    Assessment:    Patient response:   Patient responded to session by accepting feedback, giving feedback, listening, focusing on goals, being attentive and accepting support    Possible barriers to participation / learning include: and no barriers identified    Health Issues:   None reported       Substance Use Review:   Substance Use: No active concerns identified.    Mental Status/Behavioral Observations  Appearance:   Appropriate   Eye Contact:   Unable to assess via video   Psychomotor Behavior: Normal   Attitude:   Cooperative   Orientation:   All  Speech   Rate / Production: Normal/ Responsive   Volume:  Normal   Mood:    Normal  Affect:    Appropriate   Thought Content:   Clear  Thought Form:  Coherent  Logical     Insight:    Good     Plan:     Safety Plan: No current safety concerns identified.  Recommended that patient call 911 or go to the local ED should there be a change in any of these risk factors.     Barriers to treatment: None identified    Patient Contracts (see media tab):  No Substance Use Contract    Substance Use: Not addressed in session     Continue or Discharge: Patient will continue in Adult Day Treatment (ADT)  as planned. Patient is likely to  benefit from learning and using skills as they work toward the goals identified in their treatment plan.      Antoinette Morton PsyD, LP  June 16, 2020

## 2020-06-18 ENCOUNTER — HOSPITAL ENCOUNTER (OUTPATIENT)
Dept: BEHAVIORAL HEALTH | Facility: CLINIC | Age: 35
End: 2020-06-18
Attending: PSYCHIATRY & NEUROLOGY
Payer: COMMERCIAL

## 2020-06-18 PROCEDURE — 90853 GROUP PSYCHOTHERAPY: CPT | Mod: GT,95

## 2020-06-18 PROCEDURE — 90853 GROUP PSYCHOTHERAPY: CPT | Mod: GT | Performed by: SOCIAL WORKER

## 2020-06-18 PROCEDURE — 90853 GROUP PSYCHOTHERAPY: CPT | Mod: GT

## 2020-06-18 NOTE — GROUP NOTE
Psychotherapy Group Note    PATIENT'S NAME: Suzy Rodríguez  MRN:   [id number]  :   1985  ACCT. NUMBER: [id number]  DATE OF SERVICE: 20  START TIME: 11:00 AM  END TIME: 11:50 AM  FACILITATOR: Sulma Metzger LICSW  TOPIC:  EBP Group: Enhanced Mindfulness  Adult Mental Health Day Treatment  TRACK: 5A    NUMBER OF PARTICIPANTS: 3    Summary of Group / Topics Discussed:  Enhanced Mindfulness: Body and Mind Integration: Patients received an overview and education regarding the importance of including the body in the management of emotional health and self-care and as a direct route to mindfulness practice.  Patients discussed various ways in which the body can serve as an informant to their physical and emotional experiences/need. Patients discussed the body as a direct link to the present moment and to mindfulness practice.  Patients discussed current relationship with body, self-awareness, mindfulness practice and barriers to connection with body.  Patients were guided through breath work and movement to facilitate greater self-awareness, grounding, self-expression, and connection to other.  Patients discussed how the experiential could be applied to better manage mental health and develop walker connection to self.    Patient Session Goals / Objectives:    Identify how movement awareness could be used for grounding, stress management, self-expression, connection to other and self-regulation    Improved awareness of breath and movement preferences    Identify how movement and the body is used in mindfulness practice    Reflect on use of these practices in everyday life.    Identify barriers to attending to body  Telemedicine Visit: The patient's condition can be safely assessed and treated via synchronous audio and visual telemedicine encounter.      Reason for Telemedicine Visit: Services only offered telehealth and covid19    Originating Site (Patient Location): Patient's home    Distant Site  (Provider Location): Provider Remote Setting    Consent:  The patient/guardian has verbally consented to: the potential risks and benefits of telemedicine (video visit) versus in person care; bill my insurance or make self-payment for services provided; and responsibility for payment of non-covered services.     Mode of Communication:  Video Conference via [employer]    As the provider I attest to compliance with applicable laws and regulations related to telemedicine.       Patient Participation / Response:  Moderately participated, sharing some personal reflections / insights and adequately adequately received / provided feedback with other participants.    Demonstrated understanding of topics discussed through group discussion and participation and Practiced skills in session    Treatment Plan:  Patient has a current master individualized treatment plan.  See Epic treatment plan for more information.    TIM RiveraSW

## 2020-06-19 DIAGNOSIS — C74.01 MALIGNANT NEOPLASM OF CORTEX OF RIGHT ADRENAL GLAND (H): Primary | ICD-10-CM

## 2020-06-19 NOTE — GROUP NOTE
Psychoeducation Group Note    PATIENT'S NAME: Suzy Rodríguez  MRN:   [id number]  :   1985  ACCT. NUMBER: [id number]  DATE OF SERVICE: 20  START TIME: 10:00 AM  END TIME: 10:50 AM  FACILITATOR: Radhika Stevenson LICSW  TOPIC: MH Life Skills Group: Resiliency Development  Adult Mental Health Day Treatment  TRACK: 5A  Telemedicine Visit: The patient's condition can be safely assessed and treated via synchronous audio and visual telemedicine encounter.      Reason for Telemedicine Visit: Services only offered telehealth    Originating Site (Patient Location): Patient's home    Distant Site (Provider Location): [facility]: [facility]    Consent:  The patient/guardian has verbally consented to: the potential risks and benefits of telemedicine (video visit) versus in person care; bill my insurance or make self-payment for services provided; and responsibility for payment of non-covered services.     Mode of Communication:  Video Conference via [employer]    As the provider I attest to compliance with applicable laws and regulations related to telemedicine.     NUMBER OF PARTICIPANTS: 3    Summary of Group / Topics Discussed:  Resiliency Development:  Coping Skills: Patients learned about the concept of resiliency and learned ways to increase resiliency skills.    Patient Session Goals / Objectives:    Identified how using coping skills can be used for symptom and stress management       [place] about the importance of resiliency in preventing and recovering from mental illness.          Patient Participation / Response:  Fully participated with the group by sharing personal reflections / insights and openly received / provided feedback with other participants.    Verbalized understanding of content    Treatment Plan:  Patient has a current master individualized treatment plan.  See Epic treatment plan for more information.    YSABEL Burch

## 2020-06-19 NOTE — PROGRESS NOTES
"Suzy Rodríguez is a 34 year old female who is being evaluated via a billable video visit.      The patient has been notified of following:     \"This video visit will be conducted via a call between you and your physician/provider. We have found that certain health care needs can be provided without the need for an in-person physical exam.  This service lets us provide the care you need with a video conversation.  If a prescription is necessary we can send it directly to your pharmacy.  If lab work is needed we can place an order for that and you can then stop by our lab to have the test done at a later time.    Video visits are billed at different rates depending on your insurance coverage.  Please reach out to your insurance provider with any questions.    If during the course of the call the physician/provider feels a video visit is not appropriate, you will not be charged for this service.\"    Patient has given verbal consent for Video visit? Yes    Will anyone else be joining your video visit? No      Video-Visit Details    Type of service:  Video Visit    Originating Location (pt. Location): Home  Distant Location (provider location):  [facility]  Platform used for Video Visit: [employer]   Video started at 9:45 AM.   Video duration 18 minutes.     Due to the COVID 19 pandemic this visit was converted to a video visit in order to help prevent spread of infection in this patient and the general population.     The patient is seen in f/up for adrenocortical cancer.     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  The adrenal mass was first identified in May 2018, on an abdominal CT done for evaluation of kidney stones.  The mass measured 9.2 x 8 cm on the noncontrast CT from 5/8/18, and was inseparable from the liver and upper pole of the right kidney.  The " "chest CT from 6/14/18 identified 2 indeterminate solid pulmonary nodules, with the largest measuring 3 mm in the subpleural posterior left lower lobe.  The patient underwent right adrenalectomy on 6/25/18.  Surgery was complicated by diaphragmatic injury during hepatic mobilization and small fracture of the right liver lobe, managed conservatively.  The pathology report (re read at [facility]) revealed the following:  \"Adrenal cortical carcinoma, low grade, oncocytic type, forming a mass 11.3 cm and weighing 412 grams (per report). Surgical resection margins are positive for tumor (per report). Lymphovascular invasion is focally present. The tumor seems to be confined to the adrenal gland.  AJCC Stage (with available surgical materials): pT2NX (8th edition, 2017). Immunoperoxidase stains were performed on paraffin sections at the referring institution and reviewed at [facility] in [place], [place].  Tumor cells are positive for calretinin, synaptophysin and Melan A, supporting the above diagnosis. Ki-67 reveal an increased proliferation index of approximately 20% in the most active areas.\"    Treatment with mitotane was started in July 2018 and she completed radiation to the adrenal bed on October 8, 2018.  In December 2018 she was evaluated at [facility].  She was diagnosed with secondary hypothyroidism and started on 112 mcg levothyroxine daily.  She was seen by , at the [facility], in 9/2019.  According to the outside note, \"her last Mitotane level was 16.8 in August, 2019, and she is currently taking Mitotane 1000 mg TID. She does note increased nausea and vomiting, and states she is nauseated a lot, and she does vomit 2-3 times/day 4-5 days/week. She is using Compazine, which is not helpful and medical marijuana. She has noted increased diarrhea and was going 4-5 times/day, but was started on Lomotil, which she is taking 4 times/day and is now going once daily. She does complain of dry " "mouth from Lomotil. She does note increasing fatigue, and does note increasing slurred, slowed speech and does not memory loss.\"    Drinking mitotane concentration but therapeutic level has been challenging due to side effects.  At the end of September/beginning of October 2019, mitotane was discontinued for 3 weeks. At that time, she was taking a dose of 1500 mg 3 times daily.  During that period of 3 weeks, she reported feeling significantly better.      Currently, the dose of mitotane has been adjusted to 1500 mg in the morning and 1000 mg in the evening.  She has been on this dose for 1 month.  Prior, she used to take 1500/1000 mg and 1000/1000 mg every other day.  She reports feeling better with this lower dose.  On review of systems, she does endorse fatigue, some depression and anxiety, occasional episodes when she slurs the words and persistent hands tremor (maybe a little worse).  Her weight has been stable.  Occasionally, she does have short-lived episodes of lightheadedness when she suddenly stands up from a sitting or lying position.  She does not endorse nausea, vomiting, dizziness.    She reports being compliant in taking 125 mcg levothyroxine daily, 30 mg hydrocortisone around 9 AM (when she wakes up) and 20 mg at 2:30-4 PM, and 0.1 mg fludrocortisone at bedtime.    Pertinent data and labs:  5/14/18 normal plasma metanephrines  5/31/18 ALD 13.4, renin 0.8   5/28/18 urinary cortisol 351.6, 8 AM cortisol 23 (after dexamethasone), normal metanephrines     6/5/18 ACTH<10, DHEAS 740 (prior to surgery)  7/30/18  DHEAS <0.5, cortisol AM 19, testosterone <7    7/31/18 aldosterone 7.3  8/10/18 CRH 3 (normal<10)  12/3/18 cortisol PM 36, free T4 0.7, TSH 2.3, Ca 8.6,  (elevated since 11/18)   12/4/18 ACTH <5   12/6/18  DHEAS <0.5  12/20/18 ferritin 9  2/12/19 androstenedione 0.471, K 3.6, ALD 7.7 renin 4.4   2/13/19 24 hrs urinary cortisol 130.9   2/18 started on fludrocortisone at 0.1 mg daily   2/26/19 " DHEAS <15   2/26/19 9 AM cortisol 7.4, ACTH 23 (5-52).   3/12/19 DHEAS <15  3/12/19 K 4.4.  4/10/19 K 3  ...  9/24/2019 potassium 4, sodium 137, renin 10.6, mitotane 18.8, cortisol 25.8, ACTH < 5, aldosterone 4.9, free T4 1.17, TSH 0.07, DHEAS<15 (lab work was done at 9:30 AM, outside records).     Ref. Range 2/13/2019 10:00 12/25/2019 09:00 1/23/2020 08:00   Cortisol Free Urine Latest Ref Range: <=45.0 ug/d 130.9 (H) 213.4 (H) 119.0 (H)     3/30/20 mitotane 11    6/8/20   Corrected calcium 8.8   Mitotane level 9.7   TSH 0.22   Free T4 1.06   DHEAS <15     Most recent CAT scans were negative for recurrence or metastasis.    Suzy underwent bariatric surgery in 6/2016. Pre-surgery weight 235 lb, inga post-surgery 135 lbs. Her weight in 4/2017 was 139 lbs. In 3/2018, her weight was 154 lbs. It was 157 lbs prior to adrenalectomy.  Prior labs revealed malabsorption and the patient continues to take 500 mg calcium twice daily, 1 tablet of vitamin C daily, 25 mcg vitamin D daily and a daily women's multivitamin. The patient does not drink milk and she occasionally has yogurt or cheese.    Past Medical History   Past Medical History:   Diagnosis Date     Adrenal cortex cancer, right (H) 6/30/2018    Resected 6/25/18, Dr. Mansfield.     Adrenal mass (H)      Chocolate cyst of ovary 2011     History of miscarriage      Malignant neoplasm of cortex of right adrenal gland (H) 6/25/2018    EOTD; 4/29/19.  Check in May. SCP started.  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) June 25, 2018   Cushing's syndrome, secondary to #1 (300 mcg/24 hr); Rx hydrocortisone 20 + 10 post op   Post operative defect right hemidiaphragm with ?worsening right effussion, not present preop   Currently on mitotane, 500 mg, BID x 1 week + hydrocortisone     MTHFR mutation (H)    Diverticulosis  Obstructive sleep apnea which resolved following gastric bypass  GERD  Iron deficiency  Anemia  Ovarian  cyst   IUD inserted on 19    Past Surgical History   Past Surgical History:   Procedure Laterality Date     ADRENALECTOMY Right 2018    Procedure: ADRENALECTOMY;  Right Adrenalectomy,  Embolize Liver , Anesthesia Block;  Surgeon: Warren Mansfield MD;  Location: [facility] OR     ADRENALECTOMY        SECTION      twice      SECTION      2     EMBOLECTOMY ABDOMEN N/A 2018    Procedure: EMBOLECTOMY ABDOMEN;;  Surgeon: Ector Mcintyre MD;  Location: [facility] OR     EXTRACORPOREAL SHOCK WAVE LITHOTRIPSY (ESWL)       GASTRIC BYPASS         Current Medications    Current Outpatient Medications:      acetaminophen (TYLENOL) 325 MG tablet, Take 2 tablets (650 mg) by mouth every 4 hours as needed for other (multimodal surgical pain management along with NSAIDS and opioid medication as indicated based on pain control and physical function.), Disp: 150 tablet, Rfl: 0     buPROPion (WELLBUTRIN) 100 MG tablet, TAKE 1 TABLET (100 MG TOTAL) BY MOUTH 2 (TWO) TIMES A DAY., Disp: , Rfl: 3     calcium carbonate (OS-SHASHA) 500 MG tablet, Take 1 tablet (500 mg) by mouth 2 times daily, Disp: 180 tablet, Rfl: 3     cholecalciferol (VITAMIN D-1000 MAX ST) 1000 units TABS, Take 2,000 Units by mouth every morning , Disp: , Rfl:      diazepam (VALIUM) 5 MG tablet, Take 1 tablet (5 mg) by mouth once as needed for anxiety (MRI claustrophobia management), Disp: 2 tablet, Rfl: 3     EPINEPHrine (EPIPEN/ADRENACLICK/OR ANY BX GENERIC EQUIV) 0.3 MG/0.3ML injection 2-pack, As directed for bee stings, Disp: , Rfl:      ferrous fumarate 65 mg, Elim IRA. FE,-Vitamin C 125 mg (VITRON-C)  MG TABS tablet, Take 1 tablet by mouth 3 times daily, Disp: 270 tablet, Rfl: 1     fludrocortisone (FLORINEF) 0.1 MG tablet, Take 1 tablet (0.1 mg) by mouth daily, Disp: 90 tablet, Rfl: 3     FLUoxetine (PROZAC) 40 MG capsule, Take 60 mg by mouth daily , Disp: , Rfl:      gabapentin (NEURONTIN) 300 MG capsule, Take 300 mg by mouth 3  times daily, Disp: , Rfl:      hydrocortisone (CORTEF) 10 MG tablet, Take 2.5 pills (=25 mg) in AM and 1.5 pills (=15 mg) at 2 pm daily, additional as directed., Disp: 400 tablet, Rfl: 3     levothyroxine (SYNTHROID/LEVOTHROID) 125 MCG tablet, Take 1 tablet (125 mcg) by mouth daily, Disp: 90 tablet, Rfl: 3     Multiple Vitamins-Calcium (ONE-A-DAY WOMENS PO), Take 2 tablets by mouth every morning , Disp: , Rfl:      ondansetron (ZOFRAN) 8 MG tablet, Take 1 tablet (8 mg) by mouth every 8 hours as needed for nausea, Disp: 30 tablet, Rfl: 0     prochlorperazine (COMPAZINE) 5 MG tablet, Take 1 tablet (5 mg) by mouth every 8 hours as needed for nausea or vomiting, Disp: 90 tablet, Rfl: 3     traZODone (DESYREL) 50 MG tablet, Take 2 tablets (100 mg) by mouth nightly as needed for sleep, Disp: 60 tablet, Rfl: 0     UNABLE TO FIND, medical cannabis (Patient's own supply. Not a prescription), Disp: , Rfl:      vitamin B1 (THIAMINE) 50 MG tablet, Take 1 tablet (50 mg) by mouth daily, Disp: 90 tablet, Rfl: 1     mitotane (LYSODREN) 500 MG tablet, 1500mg in morning plus 1000 in evening for an total daily dose of 2500 mg., Disp: 150 tablet, Rfl: 0  Iron supplement daily for 6 months     Family History   Problem Relation Age of Onset     Depression Paternal Grandmother        Social History   with 2 children, 7 and 10-year-old. She denies smoking, drinking alcohol or using illicit drugs. Occupation: disabled.     Review of Systems   10 point review of systems negative unless specified above              Vital Signs     Previous Weights:    Wt Readings from Last 10 Encounters:   06/08/20 68.4 kg (150 lb 14.4 oz)   02/26/20 70 kg (154 lb 6.4 oz)   01/22/20 61.2 kg (135 lb)   12/17/19 67.4 kg (148 lb 9.6 oz)   12/02/19 67.5 kg (148 lb 14.4 oz)   11/14/19 65.8 kg (145 lb)   11/06/19 67.3 kg (148 lb 6.4 oz)   10/02/19 67.6 kg (149 lb)   08/27/19 67.7 kg (149 lb 3.2 oz)   08/14/19 68.5 kg (151 lb)        There were no vitals  taken for this visit.    Physical Exam  General Appearance: alert, no distress noted   Eyes: grossly normal to inspection, conjunctivae and sclerae normal, no lid lag or stare   Respiratory: no audible wheeze, cough, or visible cyanosis.  No visible retractions or increased work of breathing.  Able to speak fully in complete sentences.  Neurological: Cranial nerves grossly intact, mentation intact and speech normal; no tremor of the outstretched hands   Skin: no lesions on exposed skin   Psychological: mentation appears normal, affect normal, judgement and insight intact, normal speech and appearance well-groomed    Lab Results  I reviewed prior lab results documented in Epic.  TSH   Date Value Ref Range Status   06/08/2020 0.22 (L) 0.40 - 4.00 mU/L Final   03/30/2020 0.37 (L) 0.40 - 4.00 mU/L Final   02/26/2020 0.48 0.40 - 4.00 mU/L Final   01/22/2020 0.27 (L) 0.40 - 4.00 mU/L Final   12/12/2019 0.09 (L) 0.40 - 4.00 mU/L Final     T4 Free   Date Value Ref Range Status   06/08/2020 1.06 0.76 - 1.46 ng/dL Final   03/30/2020 1.06 0.76 - 1.46 ng/dL Final   02/26/2020 1.08 0.76 - 1.46 ng/dL Final   01/22/2020 0.87 0.76 - 1.46 ng/dL Final   12/12/2019 0.95 0.76 - 1.46 ng/dL Final       Assessment     1. 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 Ki-67 revealed an increased proliferation index of approximately 20%.   The tumor was functional and presented with high cortisol and DHEAS levels.  Post surgery/radiation and while undergoing treatment with mitotane (started in July 2018), the DHEAS has remained undetectable.  Adjusting the dose of mitotane to therapeutic levels has been difficult, due to side effects.  Currently, the patient tolerates fairly well a smaller dose of mitotane, of 2500 mg daily.  She is scheduled to have the mitotane level rechecked in 3 weeks.    2.  Primary adrenal insufficiency, mitotane-induced  She  has no definite signs or symptoms suggestive of adrenal insufficiency.  In the past, she has had intermittent episodes of mild hypokalemia, a suppressed ACTH and 24-hour urinary cortisol levels not suggestive of under replacement.  I am not sure she requires fludrocortisone.  Plan:  F/up BMP, ACTH, cortisol and 24 hrs urinary cortisol level  Adjust the HC and florinef dose based on lab results.     3. Hypothyroidism, presumed to be secondary to mitotane induced suppression of TSH   The patient has been on 125 mcg levothyroxine replacement.  Most recent free T4 was normal.  I recommended to continue to take the same dose of levothyroxine.    4. Anemia   F/up ferritin level, given her h/o gastric by pass.     5.  Hyperparathyroidism, most likely secondary to relative hypocalcemia.  Patient has been compliant taking calcium and vitamin D as instructed.  The plan is to recheck calcium, vitamin D and PTH level.    Orders Placed This Encounter   Procedures     Parathyroid Hormone Intact     Ferritin     Creatinine timed urine     Cortisol free urine     Renin activity     Basic metabolic panel     25 Hydroxyvitamin D2 and D3     Adrenal corticotropin     Cortisol     RTC 6 months.

## 2020-06-22 ENCOUNTER — VIRTUAL VISIT (OUTPATIENT)
Dept: ENDOCRINOLOGY | Facility: CLINIC | Age: 35
End: 2020-06-22
Payer: COMMERCIAL

## 2020-06-22 ENCOUNTER — RECORDS - HEALTHEAST (OUTPATIENT)
Dept: ADMINISTRATIVE | Facility: OTHER | Age: 35
End: 2020-06-22

## 2020-06-22 ENCOUNTER — HOSPITAL ENCOUNTER (OUTPATIENT)
Dept: BEHAVIORAL HEALTH | Facility: CLINIC | Age: 35
End: 2020-06-22
Attending: PSYCHIATRY & NEUROLOGY
Payer: COMMERCIAL

## 2020-06-22 ENCOUNTER — COMMUNICATION - HEALTHEAST (OUTPATIENT)
Dept: FAMILY MEDICINE | Facility: CLINIC | Age: 35
End: 2020-06-22

## 2020-06-22 DIAGNOSIS — C74.01 ADRENAL CORTEX CANCER, RIGHT (H): Primary | ICD-10-CM

## 2020-06-22 DIAGNOSIS — Z98.84 GASTRIC BYPASS STATUS FOR OBESITY: ICD-10-CM

## 2020-06-22 DIAGNOSIS — E27.40 ADRENAL INSUFFICIENCY (H): ICD-10-CM

## 2020-06-22 DIAGNOSIS — E03.8 SECONDARY HYPOTHYROIDISM: ICD-10-CM

## 2020-06-22 DIAGNOSIS — E21.3 HYPERPARATHYROIDISM (H): ICD-10-CM

## 2020-06-22 PROCEDURE — 90853 GROUP PSYCHOTHERAPY: CPT | Mod: GT,95 | Performed by: SOCIAL WORKER

## 2020-06-22 NOTE — GROUP NOTE
Process Group Note    PATIENT'S NAME: Suzy Rodríguez  MRN:   [id number]  :   1985  [id number]  DATE OF SERVICE: 20  START TIME:  9:00 AM  END TIME:  9:50 AM  FACILITATOR: Sulma Arshad LICSW  TOPIC:  Process Group    Diagnoses:  296.32 (F33.1) Major Depressive Disorder, Recurrent Episode, Moderate _ and With anxious distress  300.02 (F41.1) Generalized Anxiety Disorder      Adult Mental Health Day Treatment  TRACK: 5A    NUMBER OF PARTICIPANTS: 4    Telemedicine Visit: The patient's condition can be safely assessed and treated via synchronous audio and visual telemedicine encounter.      Reason for Telemedicine Visit: Services only offered telehealth    Originating Site (Patient Location): Patient's home    Distant Site (Provider Location): Provider Remote Setting    Consent:  The patient/guardian has verbally consented to: the potential risks and benefits of telemedicine (video visit) versus in person care; bill my insurance or make self-payment for services provided; and responsibility for payment of non-covered services.     Mode of Communication:  Video Conference via [employer]    As the provider I attest to compliance with applicable laws and regulations related to telemedicine.           Data:    Session content: At the start of this group, patients were invited to check in by identifying themselves, describing their current emotional status, and identifying issues to address in this group.   Area(s) of treatment focus addressed in this session included Symptom Management, Personal Safety and Community Resources/Discharge Planning.  Suzy reported low motivation and low energy.  She stated that she felt badly that she di not have energy to do anything for her spouse for Father's Day.  She hopes to celebrate on another day.  She stated that she felt better on Saturday.  She reported some depressed and anxious mood.      Therapeutic Interventions/Treatment  Strategies:  Psychotherapist offered support, feedback and validation and reinforced use of skills. Treatment modalities used include Cognitive Behavioral Therapy. Interventions include Coping Skills: Discussed use of self-soothe skills to decrease distress in the body.    Assessment:    Patient response:   Patient responded to session by being attentive and accepting support    Possible barriers to participation / learning include: and no barriers identified    Health Issues:   None reported       Substance Use Review:   Substance Use: No active concerns identified.    Mental Status/Behavioral Observations  Appearance:   Appropriate   Eye Contact:   Good   Psychomotor Behavior: Normal   Attitude:   Cooperative   Orientation:   All  Speech   Rate / Production: Normal    Volume:  Normal   Mood:    Anxious  Depressed   Affect:    Appropriate   Thought Content:   Clear  Thought Form:  Coherent  Logical     Insight:    Good     Plan:     Safety Plan: No current safety concerns identified.  Recommended that patient call 911 or go to the local ED should there be a change in any of these risk factors.     Barriers to treatment: None identified    Patient Contracts (see media tab):  None    Substance Use: Not addressed in session     Continue or Discharge: Patient will continue in Adult Day Treatment (ADT)  as planned. Patient is likely to benefit from learning and using skills as they work toward the goals identified in their treatment plan.      Sulma Arshad, [facility]  June 22, 2020

## 2020-06-22 NOTE — LETTER
"6/22/2020       RE: Suzy Rodríguez  [address]  [facility] [address]     Dear Colleague,    Thank you for referring your patient, Suzy Rodríguez, to the [facility] ENDOCRINOLOGY at [facility]. Please see a copy of my visit note below.    Suzy Rodríguez is a 34 year old female who is being evaluated via a billable video visit.      The patient has been notified of following:     \"This video visit will be conducted via a call between you and your physician/provider. We have found that certain health care needs can be provided without the need for an in-person physical exam.  This service lets us provide the care you need with a video conversation.  If a prescription is necessary we can send it directly to your pharmacy.  If lab work is needed we can place an order for that and you can then stop by our lab to have the test done at a later time.    Video visits are billed at different rates depending on your insurance coverage.  Please reach out to your insurance provider with any questions.    If during the course of the call the physician/provider feels a video visit is not appropriate, you will not be charged for this service.\"    Patient has given verbal consent for Video visit? Yes    Will anyone else be joining your video visit? No      Video-Visit Details    Type of service:  Video Visit    Originating Location (pt. Location): Home  Distant Location (provider location):  [facility]  Platform used for Video Visit: [employer]   Video started at 9:45 AM.   Video duration 18 minutes.     Due to the COVID 19 pandemic this visit was converted to a video visit in order to help prevent spread of infection in this patient and the general population.     The patient is seen in f/up for adrenocortical cancer.     Suzy Rodríguez is a 34 year old female with past medical history of PCOS, nephrolithiasis, MTHFR clotting disorder and gastric bypass surgery (2016), diagnosed with adrenal " "cortical carcinoma in June 2018.  The adrenal mass was first identified in May 2018, on an abdominal CT done for evaluation of kidney stones.  The mass measured 9.2 x 8 cm on the noncontrast CT from 5/8/18, and was inseparable from the liver and upper pole of the right kidney.  The chest CT from 6/14/18 identified 2 indeterminate solid pulmonary nodules, with the largest measuring 3 mm in the subpleural posterior left lower lobe.  The patient underwent right adrenalectomy on 6/25/18.  Surgery was complicated by diaphragmatic injury during hepatic mobilization and small fracture of the right liver lobe, managed conservatively.  The pathology report (re read at [facility]) revealed the following:  \"Adrenal cortical carcinoma, low grade, oncocytic type, forming a mass 11.3 cm and weighing 412 grams (per report). Surgical resection margins are positive for tumor (per report). Lymphovascular invasion is focally present. The tumor seems to be confined to the adrenal gland.  AJCC Stage (with available surgical materials): pT2NX (8th edition, 2017). Immunoperoxidase stains were performed on paraffin sections at the referring institution and reviewed at [facility] in [place], [place].  Tumor cells are positive for calretinin, synaptophysin and Melan A, supporting the above diagnosis. Ki-67 reveal an increased proliferation index of approximately 20% in the most active areas.\"    Treatment with mitotane was started in July 2018 and she completed radiation to the adrenal bed on October 8, 2018.  In December 2018 she was evaluated at [facility].  She was diagnosed with secondary hypothyroidism and started on 112 mcg levothyroxine daily.  She was seen by , at the [facility], in 9/2019.  According to the outside note, \"her last Mitotane level was 16.8 in August, 2019, and she is currently taking Mitotane 1000 mg TID. She does note increased nausea and vomiting, and states she is nauseated a lot, and she does " "vomit 2-3 times/day 4-5 days/week. She is using Compazine, which is not helpful and medical marijuana. She has noted increased diarrhea and was going 4-5 times/day, but was started on Lomotil, which she is taking 4 times/day and is now going once daily. She does complain of dry mouth from Lomotil. She does note increasing fatigue, and does note increasing slurred, slowed speech and does not memory loss.\"    Drinking mitotane concentration but therapeutic level has been challenging due to side effects.  At the end of September/beginning of October 2019, mitotane was discontinued for 3 weeks. At that time, she was taking a dose of 1500 mg 3 times daily.  During that period of 3 weeks, she reported feeling significantly better.      Currently, the dose of mitotane has been adjusted to 1500 mg in the morning and 1000 mg in the evening.  She has been on this dose for 1 month.  Prior, she used to take 1500/1000 mg and 1000/1000 mg every other day.  She reports feeling better with this lower dose.  On review of systems, she does endorse fatigue, some depression and anxiety, occasional episodes when she slurs the words and persistent hands tremor (maybe a little worse).  Her weight has been stable.  Occasionally, she does have short-lived episodes of lightheadedness when she suddenly stands up from a sitting or lying position.  She does not endorse nausea, vomiting, dizziness.    She reports being compliant in taking 125 mcg levothyroxine daily, 30 mg hydrocortisone around 9 AM (when she wakes up) and 20 mg at 2:30-4 PM, and 0.1 mg fludrocortisone at bedtime.    Pertinent data and labs:  5/14/18 normal plasma metanephrines  5/31/18 ALD 13.4, renin 0.8   5/28/18 urinary cortisol 351.6, 8 AM cortisol 23 (after dexamethasone), normal metanephrines     6/5/18 ACTH<10, DHEAS 740 (prior to surgery)  7/30/18  DHEAS <0.5, cortisol AM 19, testosterone <7    7/31/18 aldosterone 7.3  8/10/18 CRH 3 (normal<10)  12/3/18 cortisol PM 36, " free T4 0.7, TSH 2.3, Ca 8.6,  (elevated since 11/18)   12/4/18 ACTH <5   12/6/18  DHEAS <0.5  12/20/18 ferritin 9  2/12/19 androstenedione 0.471, K 3.6, ALD 7.7 renin 4.4   2/13/19 24 hrs urinary cortisol 130.9   2/18 started on fludrocortisone at 0.1 mg daily   2/26/19 DHEAS <15   2/26/19 9 AM cortisol 7.4, ACTH 23 (5-52).   3/12/19 DHEAS <15  3/12/19 K 4.4.  4/10/19 K 3  ...  9/24/2019 potassium 4, sodium 137, renin 10.6, mitotane 18.8, cortisol 25.8, ACTH < 5, aldosterone 4.9, free T4 1.17, TSH 0.07, DHEAS<15 (lab work was done at 9:30 AM, outside records).     Ref. Range 2/13/2019 10:00 12/25/2019 09:00 1/23/2020 08:00   Cortisol Free Urine Latest Ref Range: <=45.0 ug/d 130.9 (H) 213.4 (H) 119.0 (H)     3/30/20 mitotane 11    6/8/20   Corrected calcium 8.8   Mitotane level 9.7   TSH 0.22   Free T4 1.06   DHEAS <15     Most recent CAT scans were negative for recurrence or metastasis.    Suzy underwent bariatric surgery in 6/2016. Pre-surgery weight 235 lb, inga post-surgery 135 lbs. Her weight in 4/2017 was 139 lbs. In 3/2018, her weight was 154 lbs. It was 157 lbs prior to adrenalectomy.  Prior labs revealed malabsorption and the patient continues to take 500 mg calcium twice daily, 1 tablet of vitamin C daily, 25 mcg vitamin D daily and a daily women's multivitamin. The patient does not drink milk and she occasionally has yogurt or cheese.    Past Medical History   Past Medical History:   Diagnosis Date     Adrenal cortex cancer, right (H) 6/30/2018    Resected 6/25/18, Dr. Mansfield.     Adrenal mass (H)      Chocolate cyst of ovary 2011     History of miscarriage      Malignant neoplasm of cortex of right adrenal gland (H) 6/25/2018    EOTD; 4/29/19.  Check in May. SCP started.  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) June 25, 2018   Cushing's syndrome, secondary to #1 (300 mcg/24 hr); Rx hydrocortisone 20 + 10 post op   Post operative defect  right hemidiaphragm with ?worsening right effussion, not present preop   Currently on mitotane, 500 mg, BID x 1 week + hydrocortisone     MTHFR mutation (H)    Diverticulosis  Obstructive sleep apnea which resolved following gastric bypass  GERD  Iron deficiency  Anemia  Ovarian cyst   IUD inserted on 19    Past Surgical History   Past Surgical History:   Procedure Laterality Date     ADRENALECTOMY Right 2018    Procedure: ADRENALECTOMY;  Right Adrenalectomy,  Embolize Liver , Anesthesia Block;  Surgeon: Warren Mansfield MD;  Location: [facility] OR     ADRENALECTOMY        SECTION      twice      SECTION      2     EMBOLECTOMY ABDOMEN N/A 2018    Procedure: EMBOLECTOMY ABDOMEN;;  Surgeon: Ector Mcintyre MD;  Location: [facility] OR     EXTRACORPOREAL SHOCK WAVE LITHOTRIPSY (ESWL)       GASTRIC BYPASS         Current Medications    Current Outpatient Medications:      acetaminophen (TYLENOL) 325 MG tablet, Take 2 tablets (650 mg) by mouth every 4 hours as needed for other (multimodal surgical pain management along with NSAIDS and opioid medication as indicated based on pain control and physical function.), Disp: 150 tablet, Rfl: 0     buPROPion (WELLBUTRIN) 100 MG tablet, TAKE 1 TABLET (100 MG TOTAL) BY MOUTH 2 (TWO) TIMES A DAY., Disp: , Rfl: 3     calcium carbonate (OS-SHASHA) 500 MG tablet, Take 1 tablet (500 mg) by mouth 2 times daily, Disp: 180 tablet, Rfl: 3     cholecalciferol (VITAMIN D-1000 MAX ST) 1000 units TABS, Take 2,000 Units by mouth every morning , Disp: , Rfl:      diazepam (VALIUM) 5 MG tablet, Take 1 tablet (5 mg) by mouth once as needed for anxiety (MRI claustrophobia management), Disp: 2 tablet, Rfl: 3     EPINEPHrine (EPIPEN/ADRENACLICK/OR ANY BX GENERIC EQUIV) 0.3 MG/0.3ML injection 2-pack, As directed for bee stings, Disp: , Rfl:      ferrous fumarate 65 mg, [place]. FE,-Vitamin C 125 mg (VITRON-C)  MG TABS tablet, Take 1 tablet by mouth 3 times daily,  Disp: 270 tablet, Rfl: 1     fludrocortisone (FLORINEF) 0.1 MG tablet, Take 1 tablet (0.1 mg) by mouth daily, Disp: 90 tablet, Rfl: 3     FLUoxetine (PROZAC) 40 MG capsule, Take 60 mg by mouth daily , Disp: , Rfl:      gabapentin (NEURONTIN) 300 MG capsule, Take 300 mg by mouth 3 times daily, Disp: , Rfl:      hydrocortisone (CORTEF) 10 MG tablet, Take 2.5 pills (=25 mg) in AM and 1.5 pills (=15 mg) at 2 pm daily, additional as directed., Disp: 400 tablet, Rfl: 3     levothyroxine (SYNTHROID/LEVOTHROID) 125 MCG tablet, Take 1 tablet (125 mcg) by mouth daily, Disp: 90 tablet, Rfl: 3     Multiple Vitamins-Calcium (ONE-A-DAY WOMENS PO), Take 2 tablets by mouth every morning , Disp: , Rfl:      ondansetron (ZOFRAN) 8 MG tablet, Take 1 tablet (8 mg) by mouth every 8 hours as needed for nausea, Disp: 30 tablet, Rfl: 0     prochlorperazine (COMPAZINE) 5 MG tablet, Take 1 tablet (5 mg) by mouth every 8 hours as needed for nausea or vomiting, Disp: 90 tablet, Rfl: 3     traZODone (DESYREL) 50 MG tablet, Take 2 tablets (100 mg) by mouth nightly as needed for sleep, Disp: 60 tablet, Rfl: 0     UNABLE TO FIND, medical cannabis (Patient's own supply. Not a prescription), Disp: , Rfl:      vitamin B1 (THIAMINE) 50 MG tablet, Take 1 tablet (50 mg) by mouth daily, Disp: 90 tablet, Rfl: 1     mitotane (LYSODREN) 500 MG tablet, 1500mg in morning plus 1000 in evening for an total daily dose of 2500 mg., Disp: 150 tablet, Rfl: 0  Iron supplement daily for 6 months     Family History   Problem Relation Age of Onset     Depression Paternal Grandmother        Social History   with 2 children, 7 and 10-year-old. She denies smoking, drinking alcohol or using illicit drugs. Occupation: disabled.     Review of Systems   10 point review of systems negative unless specified above              Vital Signs     Previous Weights:    Wt Readings from Last 10 Encounters:   06/08/20 68.4 kg (150 lb 14.4 oz)   02/26/20 70 kg (154 lb 6.4 oz)    01/22/20 61.2 kg (135 lb)   12/17/19 67.4 kg (148 lb 9.6 oz)   12/02/19 67.5 kg (148 lb 14.4 oz)   11/14/19 65.8 kg (145 lb)   11/06/19 67.3 kg (148 lb 6.4 oz)   10/02/19 67.6 kg (149 lb)   08/27/19 67.7 kg (149 lb 3.2 oz)   08/14/19 68.5 kg (151 lb)        There were no vitals taken for this visit.    Physical Exam  General Appearance: alert, no distress noted   Eyes: grossly normal to inspection, conjunctivae and sclerae normal, no lid lag or stare   Respiratory: no audible wheeze, cough, or visible cyanosis.  No visible retractions or increased work of breathing.  Able to speak fully in complete sentences.  Neurological: Cranial nerves grossly intact, mentation intact and speech normal; no tremor of the outstretched hands   Skin: no lesions on exposed skin   Psychological: mentation appears normal, affect normal, judgement and insight intact, normal speech and appearance well-groomed    Lab Results  I reviewed prior lab results documented in Epic.  TSH   Date Value Ref Range Status   06/08/2020 0.22 (L) 0.40 - 4.00 mU/L Final   03/30/2020 0.37 (L) 0.40 - 4.00 mU/L Final   02/26/2020 0.48 0.40 - 4.00 mU/L Final   01/22/2020 0.27 (L) 0.40 - 4.00 mU/L Final   12/12/2019 0.09 (L) 0.40 - 4.00 mU/L Final     T4 Free   Date Value Ref Range Status   06/08/2020 1.06 0.76 - 1.46 ng/dL Final   03/30/2020 1.06 0.76 - 1.46 ng/dL Final   02/26/2020 1.08 0.76 - 1.46 ng/dL Final   01/22/2020 0.87 0.76 - 1.46 ng/dL Final   12/12/2019 0.95 0.76 - 1.46 ng/dL Final       Assessment     1. 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 Ki-67 revealed an increased proliferation index of approximately 20%.   The tumor was functional and presented with high cortisol and DHEAS levels.  Post surgery/radiation and while undergoing treatment with mitotane (started in July 2018), the DHEAS has remained undetectable.  Adjusting the dose of mitotane  to therapeutic levels has been difficult, due to side effects.  Currently, the patient tolerates fairly well a smaller dose of mitotane, of 2500 mg daily.  She is scheduled to have the mitotane level rechecked in 3 weeks.    2.  Primary adrenal insufficiency, mitotane-induced  She has no definite signs or symptoms suggestive of adrenal insufficiency.  In the past, she has had intermittent episodes of mild hypokalemia, a suppressed ACTH and 24-hour urinary cortisol levels not suggestive of under replacement.  I am not sure she requires fludrocortisone.  Plan:  F/up BMP, ACTH, cortisol and 24 hrs urinary cortisol level  Adjust the HC and florinef dose based on lab results.     3. Hypothyroidism, presumed to be secondary to mitotane induced suppression of TSH   The patient has been on 125 mcg levothyroxine replacement.  Most recent free T4 was normal.  I recommended to continue to take the same dose of levothyroxine.    4. Anemia   F/up ferritin level, given her h/o gastric by pass.     5.  Hyperparathyroidism, most likely secondary to relative hypocalcemia.  Patient has been compliant taking calcium and vitamin D as instructed.  The plan is to recheck calcium, vitamin D and PTH level.    Orders Placed This Encounter   Procedures     Parathyroid Hormone Intact     Ferritin     Creatinine timed urine     Cortisol free urine     Renin activity     Basic metabolic panel     25 Hydroxyvitamin D2 and D3     Adrenal corticotropin     Cortisol     RTC 6 months.       Again, thank you for allowing me to participate in the care of your patient.      Sincerely,    Veena Jaquez MD

## 2020-06-23 ENCOUNTER — COMMUNICATION - HEALTHEAST (OUTPATIENT)
Dept: BEHAVIORAL HEALTH | Facility: CLINIC | Age: 35
End: 2020-06-23

## 2020-06-23 ENCOUNTER — COMMUNICATION - HEALTHEAST (OUTPATIENT)
Dept: FAMILY MEDICINE | Facility: CLINIC | Age: 35
End: 2020-06-23

## 2020-06-24 ENCOUNTER — OFFICE VISIT - HEALTHEAST (OUTPATIENT)
Dept: FAMILY MEDICINE | Facility: CLINIC | Age: 35
End: 2020-06-24

## 2020-06-24 DIAGNOSIS — F41.9 ANXIETY: ICD-10-CM

## 2020-06-24 DIAGNOSIS — F33.1 MAJOR DEPRESSIVE DISORDER, RECURRENT EPISODE, MODERATE (H): ICD-10-CM

## 2020-06-24 ASSESSMENT — ANXIETY QUESTIONNAIRES
6. BECOMING EASILY ANNOYED OR IRRITABLE: SEVERAL DAYS
5. BEING SO RESTLESS THAT IT IS HARD TO SIT STILL: NOT AT ALL
4. TROUBLE RELAXING: MORE THAN HALF THE DAYS
7. FEELING AFRAID AS IF SOMETHING AWFUL MIGHT HAPPEN: NEARLY EVERY DAY
GAD7 TOTAL SCORE: 12
2. NOT BEING ABLE TO STOP OR CONTROL WORRYING: NEARLY EVERY DAY
1. FEELING NERVOUS, ANXIOUS, OR ON EDGE: NEARLY EVERY DAY
3. WORRYING TOO MUCH ABOUT DIFFERENT THINGS: NOT AT ALL

## 2020-06-24 ASSESSMENT — PATIENT HEALTH QUESTIONNAIRE - PHQ9: SUM OF ALL RESPONSES TO PHQ QUESTIONS 1-9: 19

## 2020-06-25 ENCOUNTER — HOSPITAL ENCOUNTER (OUTPATIENT)
Dept: BEHAVIORAL HEALTH | Facility: CLINIC | Age: 35
End: 2020-06-25
Attending: PSYCHIATRY & NEUROLOGY
Payer: COMMERCIAL

## 2020-06-25 PROCEDURE — 90853 GROUP PSYCHOTHERAPY: CPT | Mod: GT | Performed by: SOCIAL WORKER

## 2020-06-25 PROCEDURE — 90853 GROUP PSYCHOTHERAPY: CPT | Mod: GT,95 | Performed by: SOCIAL WORKER

## 2020-06-25 NOTE — GROUP NOTE
Process Group Note    PATIENT'S NAME: Suzy Rodríguez  MRN:   [id number]  :   1985  ACCT. NUMBER: [id number]  DATE OF SERVICE: 20  START TIME:  9:00 AM  END TIME:  9:50 AM  FACILITATOR: Sulma Arshad LICSW  TOPIC:  Process Group    Diagnoses:  296.32 (F33.1) Major Depressive Disorder, Recurrent Episode, Moderate _ and With anxious distress  300.02 (F41.1) Generalized Anxiety Disorder      Adult Mental Health Day Treatment  TRACK: 5A    NUMBER OF PARTICIPANTS: 4    Telemedicine Visit: The patient's condition can be safely assessed and treated via synchronous audio and visual telemedicine encounter.      Reason for Telemedicine Visit: Services only offered telehealth    Originating Site (Patient Location): Patient's home    Distant Site (Provider Location): Provider Remote Setting    Consent:  The patient/guardian has verbally consented to: the potential risks and benefits of telemedicine (video visit) versus in person care; bill my insurance or make self-payment for services provided; and responsibility for payment of non-covered services.     Mode of Communication:  Video Conference via [employer]    As the provider I attest to compliance with applicable laws and regulations related to telemedicine.           Data:    Session content: At the start of this group, patients were invited to check in by identifying themselves, describing their current emotional status, and identifying issues to address in this group.   Area(s) of treatment focus addressed in this session included Symptom Management, Personal Safety and Community Resources/Discharge Planning.  Suzy reported difficulty with panic attacks a couple of times per day.  She stated that the clonazepam is being tapered off and she started the lowest dose today.  She stated that she tries to do breathing exercises, goes to a dark bedroom and pets her dog, of goes to sleep as her coping skills.  She is considering decreasing caffeine  intake as she has one 16 ounce coffee and one 12 ounce energy drink each day.      Therapeutic Interventions/Treatment Strategies:  Psychotherapist offered support, feedback and validation and reinforced use of skills. Treatment modalities used include Cognitive Behavioral Therapy. Interventions include Coping Skills: Discussed use of self-soothe skills to decrease distress in the body.    Assessment:    Patient response:   Patient responded to session by listening and focusing on goals    Possible barriers to participation / learning include: and no barriers identified    Health Issues:   Yes: cancer in remission, No Psychological Distress       Substance Use Review:   Substance Use: No active concerns identified.    Mental Status/Behavioral Observations  Appearance:   Appropriate   Eye Contact:   Good   Psychomotor Behavior: Normal   Attitude:   Cooperative   Orientation:   All  Speech   Rate / Production: Normal    Volume:  Normal   Mood:    Anxious   Affect:    Appropriate   Thought Content:   Clear  Thought Form:  Coherent  Logical     Insight:    Good     Plan:     Safety Plan: No current safety concerns identified.  Recommended that patient call 911 or go to the local ED should there be a change in any of these risk factors.     Barriers to treatment: None identified    Patient Contracts (see media tab):  None    Substance Use: Not addressed in session     Continue or Discharge: Patient will continue in Adult Day Treatment (ADT)  as planned. Patient is likely to benefit from learning and using skills as they work toward the goals identified in their treatment plan.      Sulma Arshad, [facility]  June 25, 2020

## 2020-06-25 NOTE — GROUP NOTE
Psychotherapy Group Note    PATIENT'S NAME: Suzy Rodríguez  MRN:   [id number]  :   1985  ACCT. NUMBER: [id number]  DATE OF SERVICE: 20  START TIME: 10:00 AM  END TIME: 10:45 AM  FACILITATOR: Sulma Arshad LICSW  TOPIC: MH EBP Group: Emotions Management  Adult Mental Health Day Treatment  TRACK: 5A    NUMBER OF PARTICIPANTS: 4    Summary of Group / Topics Discussed:  Emotions Management: Mood Tracking: Patients discussed and reviewed different resources to track one s mood, with a goal of identifying patterns and correlations between different factors and mood state.  Patients discussed ways to increase awareness of one s mood and how it may be impacted by environmental factors, diet, activity level, medication, etc. The group shared their experiences and thought processes for feedback.      Patient Session Goals / Objectives:    Increase awareness of daily mood patterns/changes    Report out identified factors that impact their mood    Demonstrate understanding of how to use different resources to track mood, and effectively use these to help manage symptoms    Telemedicine Visit: The patient's condition can be safely assessed and treated via synchronous audio and visual telemedicine encounter.      Reason for Telemedicine Visit: Services only offered telehealth    Originating Site (Patient Location): Patient's home    Distant Site (Provider Location): Provider Remote Setting    Consent:  The patient/guardian has verbally consented to: the potential risks and benefits of telemedicine (video visit) versus in person care; bill my insurance or make self-payment for services provided; and responsibility for payment of non-covered services.     Mode of Communication:  Video Conference via [employer]    As the provider I attest to compliance with applicable laws and regulations related to telemedicine.       Patient Participation / Response:  Fully participated with the group by sharing  personal reflections / insights and openly received / provided feedback with other participants.    Identified strategies to overcome barriers to use of emotions management skills    Treatment Plan:  Patient has a current master individualized treatment plan.  See Epic treatment plan for more information.    Sulma Arshad, [facility]

## 2020-06-25 NOTE — GROUP NOTE
Psychotherapy Group Note    PATIENT'S NAME: Suzy Rodríguez  MRN:   [id number]  :   1985  ACCT. NUMBER: [id number]  DATE OF SERVICE: 20  START TIME: 11:00 AM  END TIME: 11:50 AM  FACILITATOR: Sulma Metzger LICSW  TOPIC:  EBP Group: Enhanced Mindfulness  Adult Mental Health Day Treatment  TRACK: 5A    NUMBER OF PARTICIPANTS: 4    Summary of Group / Topics Discussed:  Enhanced Mindfulness: Body and Mind Integration: Patients received an overview and education regarding the importance of including the body in the management of emotional health and self-care and as a direct route to mindfulness practice.  Patients discussed various ways in which the body can serve as an informant to their physical and emotional experiences/need. Patients discussed the body as a direct link to the present moment and to mindfulness practice.  Patients discussed current relationship with body, self-awareness, mindfulness practice and barriers to connection with body.  Patients were guided through breath work and movement to facilitate greater self-awareness, grounding, self-expression, and connection to other.  Patients discussed how the experiential could be applied to better manage mental health and develop walker connection to self.    Patient Session Goals / Objectives:    Identify how movement awareness could be used for grounding, stress management, self-expression, connection to other and self-regulation    Improved awareness of breath and movement preferences    Identify how movement and the body is used in mindfulness practice    Reflect on use of these practices in everyday life.    Identify barriers to attending to body    Telemedicine Visit: The patient's condition can be safely assessed and treated via synchronous audio and visual telemedicine encounter.          Reason for Telemedicine Visit: Services only offered telehealth and covid19        Originating Site (Patient Location): Patient's  home        Distant Site (Provider Location): Provider Remote Setting        Consent:  The patient/guardian has verbally consented to: the potential risks and benefits of telemedicine (video visit) versus in person care; bill my insurance or make self-payment for services provided; and responsibility for payment of non-covered services.         Mode of Communication:  Video Conference via [employer]        As the provider I attest to compliance with applicable laws and regulations related to telemedicine.         Patient Participation / Response:  Moderately participated, sharing some personal reflections / insights and adequately adequately received / provided feedback with other participants.    Demonstrated understanding of topics discussed through group discussion and participation and Practiced skills in session    Treatment Plan:  Patient has a current master individualized treatment plan.  See Epic treatment plan for more information.    TIM RiveraSW

## 2020-06-29 ENCOUNTER — HOSPITAL ENCOUNTER (OUTPATIENT)
Dept: BEHAVIORAL HEALTH | Facility: CLINIC | Age: 35
End: 2020-06-29
Attending: PSYCHIATRY & NEUROLOGY
Payer: COMMERCIAL

## 2020-06-29 PROCEDURE — 90853 GROUP PSYCHOTHERAPY: CPT | Mod: GT | Performed by: COUNSELOR

## 2020-06-29 PROCEDURE — 90853 GROUP PSYCHOTHERAPY: CPT | Mod: GT,95 | Performed by: SOCIAL WORKER

## 2020-06-29 NOTE — GROUP NOTE
Psychoeducation Group Note    PATIENT'S NAME: Suzy Rodríguez  MRN:   [id number]  :   1985  ACCT. NUMBER: [id number]  DATE OF SERVICE: 20  START TIME:  9:00 AM  END TIME:  9:50 AM  FACILITATOR: Lisbet Raines LPCC  TOPIC: MH RN Group: Mental Health Maintenance  Adult Mental Health Day Treatment  TRACK: 5A    NUMBER OF PARTICIPANTS: 3    Telemedicine Visit: The patient's condition can be safely assessed and treated via synchronous audio and visual telemedicine encounter.      Reason for Telemedicine Visit: Services only offered telehealth    Originating Site (Patient Location): Patient's home    Distant Site (Provider Location): Provider Remote Setting    Consent:  The patient/guardian has verbally consented to: the potential risks and benefits of telemedicine (video visit) versus in person care; bill my insurance or make self-payment for services provided; and responsibility for payment of non-covered services.     Mode of Communication:  Video Conference via [employer]    As the provider I attest to compliance with applicable laws and regulations related to telemedicine.      Summary of Group / Topics Discussed:  Mental Health Maintenance:  Vulnerability: In this group, the concept of vulnerability was explored through the viewing, discussion, and self-reflection of the Mehrdad Villanueva Talk Titled,  The Power of Vulnerability.      Patient Session Goals / Objectives:  ? Defined and described definition of vulnerability   ? Identified 2 or more ways of practicing authenticity         Patient Participation / Response:  Minimally participated, only when prompted / asked.    Demonstrated understanding of topics discussed through group discussion and participation    Treatment Plan:  Patient has a current master individualized treatment plan.  See Epic treatment plan for more information.    CARIDAD Kim

## 2020-06-29 NOTE — GROUP NOTE
"Process Group Note    PATIENT'S NAME: Suzy Rodríguez  MRN:   [id number]  :   1985  ACCT. NUMBER: [id number]  DATE OF SERVICE: 20  START TIME: 10:00 AM  END TIME: 10:50 AM  FACILITATOR: Sulma Arshad LICSW  TOPIC:  Process Group    Diagnoses:  296.32 (F33.1) Major Depressive Disorder, Recurrent Episode, Moderate _ and With anxious distress  300.02 (F41.1) Generalized Anxiety Disorder      Adult Mental Health Day Treatment  TRACK: 5A    NUMBER OF PARTICIPANTS: 3    Telemedicine Visit: The patient's condition can be safely assessed and treated via synchronous audio and visual telemedicine encounter.      Reason for Telemedicine Visit: Services only offered telehealth    Originating Site (Patient Location): Patient's home    Distant Site (Provider Location): Provider Remote Setting    Consent:  The patient/guardian has verbally consented to: the potential risks and benefits of telemedicine (video visit) versus in person care; bill my insurance or make self-payment for services provided; and responsibility for payment of non-covered services.     Mode of Communication:  Video Conference via [employer]    As the provider I attest to compliance with applicable laws and regulations related to telemedicine.           Data:    Session content: At the start of this group, patients were invited to check in by identifying themselves, describing their current emotional status, and identifying issues to address in this group.   Area(s) of treatment focus addressed in this session included Symptom Management, Personal Safety and Community Resources/Discharge Planning.  Suzy reported having a \"normal weekend\".  She reported drinking more alcohol than planned with her neighbors.  She stated that she had promised herself to not drink, so she was upset with her self.  She stated that she was wanting to go to [employer] for some household items, but has social anxiety.  She identified fear of embarrassing " herself.  She also identified fear of saying something strange.   Peers gave feedback on how she might deal with those fears at [employer].      Therapeutic Interventions/Treatment Strategies:  Psychotherapist offered support, feedback and validation and reinforced use of skills. Treatment modalities used include Cognitive Behavioral Therapy. Interventions include Coping Skills: Addressed barriers to utilizing coping skills when in distress.    Assessment:    Patient response:   Patient responded to session by focusing on goals and being attentive    Possible barriers to participation / learning include: and no barriers identified    Health Issues:   None reported       Substance Use Review:   Substance Use: No active concerns identified.    Mental Status/Behavioral Observations  Appearance:   Appropriate   Eye Contact:   Good   Psychomotor Behavior: Normal   Attitude:   Cooperative   Orientation:   All  Speech   Rate / Production: Normal    Volume:  Normal   Mood:    Anxious  Depressed   Affect:    Appropriate   Thought Content:   Rumination  Thought Form:  Coherent  Logical     Insight:    Good     Plan:     Safety Plan: No current safety concerns identified.  Recommended that patient call 911 or go to the local ED should there be a change in any of these risk factors.     Barriers to treatment: None identified    Patient Contracts (see media tab):  None    Substance Use: Not addressed in session     Continue or Discharge: Patient will continue in Adult Day Treatment (ADT)  as planned. Patient is likely to benefit from learning and using skills as they work toward the goals identified in their treatment plan.      Sulma Arshad, [facility]  June 29, 2020

## 2020-06-29 NOTE — GROUP NOTE
Psychotherapy Group Note    PATIENT'S NAME: Suzy Rodríguez  MRN:   [id number]  :   1985  ACCT. NUMBER: [id number]  DATE OF SERVICE: 20  START TIME: 11:00 AM  END TIME: 11:50 AM  FACILITATOR: Sulma Arshad LICSW  TOPIC: MH EBP Group: Relationship Skills  Adult Mental Health Day Treatment  TRACK: 5A    NUMBER OF PARTICIPANTS: 3    Summary of Group / Topics Discussed:  Relationship Skills: Assertive Communication: Patients were provided with a general overview of assertive communication skills and how practicing assertive communication skills will assist patients in developing healthier and more effective relationships. Patients reviewed their current awareness on ability to practice assertive communication, ways to increase assertive communication, and identified/problem solved barriers to assertive communication.     Patient Session Goals / Objectives:    Identified and discussed patient individual challenges with communication    Presented and practiced effective communication skills in session    Assisted patients in implementing more effective communication skills in their relationships    Telemedicine Visit: The patient's condition can be safely assessed and treated via synchronous audio and visual telemedicine encounter.      Reason for Telemedicine Visit: Services only offered telehealth    Originating Site (Patient Location): Patient's home    Distant Site (Provider Location): Provider Remote Setting    Consent:  The patient/guardian has verbally consented to: the potential risks and benefits of telemedicine (video visit) versus in person care; bill my insurance or make self-payment for services provided; and responsibility for payment of non-covered services.     Mode of Communication:  Video Conference via [employer]    As the provider I attest to compliance with applicable laws and regulations related to telemedicine.       Patient Participation / Response:  Fully participated  with the group by sharing personal reflections / insights and openly received / provided feedback with other participants.    Demonstrated understanding of relationship skills and communication skills    Treatment Plan:  Patient has a current master individualized treatment plan.  See Epic treatment plan for more information.    Sulma Arshad, [facility]

## 2020-06-30 ENCOUNTER — HOSPITAL ENCOUNTER (OUTPATIENT)
Dept: BEHAVIORAL HEALTH | Facility: CLINIC | Age: 35
End: 2020-06-30
Attending: PSYCHIATRY & NEUROLOGY
Payer: COMMERCIAL

## 2020-06-30 PROCEDURE — 90853 GROUP PSYCHOTHERAPY: CPT | Mod: GT,95 | Performed by: SOCIAL WORKER

## 2020-06-30 NOTE — GROUP NOTE
Process Group Note    PATIENT'S NAME: Suzy Rodríguez  MRN:   [id number]  :   1985  ACCT. NUMBER: [id number]  DATE OF SERVICE: 20  START TIME:  9:00 AM  END TIME:  9:50 AM  FACILITATOR: Sulma Arshad LICSW  TOPIC:  Process Group    Diagnoses:  296.32 (F33.1) Major Depressive Disorder, Recurrent Episode, Moderate _ and With anxious distress  300.02 (F41.1) Generalized Anxiety Disorder      Adult Mental Health Day Treatment  TRACK: 5A    NUMBER OF PARTICIPANTS: 3    Telemedicine Visit: The patient's condition can be safely assessed and treated via synchronous audio and visual telemedicine encounter.      Reason for Telemedicine Visit: Patient has requested telehealth visit    Originating Site (Patient Location): Patient's home    Distant Site (Provider Location): Provider Remote Setting    Consent:  The patient/guardian has verbally consented to: the potential risks and benefits of telemedicine (video visit) versus in person care; bill my insurance or make self-payment for services provided; and responsibility for payment of non-covered services.     Mode of Communication:  Video Conference via [employer]    As the provider I attest to compliance with applicable laws and regulations related to telemedicine.           Data:    Session content: At the start of this group, patients were invited to check in by identifying themselves, describing their current emotional status, and identifying issues to address in this group.   Area(s) of treatment focus addressed in this session included Symptom Management, Personal Safety and Community Resources/Discharge Planning.  Suzy reported not following through with her goal to shop at [employer].  She stated that she may go to [employer] with her son after they have his orthodontics appointment and a lunch with her parents.  She stated that her depressive symptoms are increased.  She stated that she stayed in bed all day yesterday.  She set a goal to avoid  naps today and to spend time in common areas of the hours.  She also plans to take a short walk.  She stated that she is having some excessive sleep.  She hopes to renew her medical marijuana prescription and plans to contact the provider this week.  She reported decreased focus, decreased concentration.  She hopes to practice more activation by staying out o the bedroom.  Client denied suicidal ideation, intent and plan.     Therapeutic Interventions/Treatment Strategies:  Psychotherapist offered support, feedback and validation and reinforced use of skills. Treatment modalities used include Cognitive Behavioral Therapy. Interventions include Coping Skills: Assisted patient in understanding the purpose of planning / creating / participating / sharing in positive experiences.    Assessment:    Patient response:   Patient responded to session by listening, focusing on goals and being attentive    Possible barriers to participation / learning include: and no barriers identified    Health Issues:   Yes: cancer in remission, No Psychological Distress       Substance Use Review:   Substance Use: No active concerns identified.    Mental Status/Behavioral Observations  Appearance:   Appropriate   Eye Contact:   Good   Psychomotor Behavior: Normal   Attitude:   Cooperative   Orientation:   All  Speech   Rate / Production: Normal    Volume:  Normal   Mood:    Anxious  Depressed   Affect:    Appropriate   Thought Content:   Clear  Thought Form:  Coherent  Logical     Insight:    Good     Plan:     Safety Plan: No current safety concerns identified.  Recommended that patient call 911 or go to the local ED should there be a change in any of these risk factors.     Barriers to treatment: None identified    Patient Contracts (see media tab):  None    Substance Use: Not addressed in session     Continue or Discharge: Patient will continue in Adult Day Treatment (ADT)  as planned. Patient is likely to benefit from learning and  using skills as they work toward the goals identified in their treatment plan.      Sulma Arshda, [facility]  June 30, 2020

## 2020-07-02 ENCOUNTER — HOSPITAL ENCOUNTER (OUTPATIENT)
Dept: BEHAVIORAL HEALTH | Facility: CLINIC | Age: 35
End: 2020-07-02
Attending: PSYCHIATRY & NEUROLOGY
Payer: COMMERCIAL

## 2020-07-02 PROCEDURE — 90853 GROUP PSYCHOTHERAPY: CPT | Mod: GT | Performed by: SOCIAL WORKER

## 2020-07-02 PROCEDURE — 90853 GROUP PSYCHOTHERAPY: CPT | Mod: 95 | Performed by: SOCIAL WORKER

## 2020-07-02 NOTE — GROUP NOTE
Psychotherapy Group Note    PATIENT'S NAME: Suzy Rodríguez  MRN:   [id number]  :   1985  ACCT. NUMBER: [id number]  DATE OF SERVICE: 20  START TIME: 10:00 AM  END TIME: 10:50 AM  FACILITATOR: Sulma Arshad LICSW  TOPIC: MH EBP Group: Relationship Skills  Adult Mental Health Day Treatment  TRACK: 5A    NUMBER OF PARTICIPANTS: 3    Summary of Group / Topics Discussed:  Relationship Skills: Conflict Resolution: Topic of conflict resolution in interpersonal communication was discussed. Patients received an overview of how communication skills during times of conflict impact symptoms and functioning. Patients discussed their current communication challenges and how this impacts their functioning. Patients also verbalized understanding of skills learned and practiced in session.     Patient Session Goals / Objectives:    Identified and discussed patient individual challenges with communication    Presented and practiced effective communication skills in session    Assisted patients in implementing more effective communication skills in their relationships    Telemedicine Visit: The patient's condition can be safely assessed and treated via synchronous audio and visual telemedicine encounter.      Reason for Telemedicine Visit: Services only offered telehealth    Originating Site (Patient Location): Patient's home    Distant Site (Provider Location): Provider Remote Setting    Consent:  The patient/guardian has verbally consented to: the potential risks and benefits of telemedicine (video visit) versus in person care; bill my insurance or make self-payment for services provided; and responsibility for payment of non-covered services.     Mode of Communication:  Video Conference via [employer]    As the provider I attest to compliance with applicable laws and regulations related to telemedicine.       Patient Participation / Response:  Fully participated with the group by sharing personal  reflections / insights and openly received / provided feedback with other participants.    Demonstrated understanding of relationship skills and communication skills    Treatment Plan:  Patient has a current master individualized treatment plan.  See Epic treatment plan for more information.    Sulma Arshad, [facility]

## 2020-07-02 NOTE — GROUP NOTE
Process Group Note    PATIENT'S NAME: Suzy Rodríguez  MRN:   [id number]  :   1985  ACCT. NUMBER: [id number]  DATE OF SERVICE: 20  START TIME:  9:00 AM  END TIME:  9:50 AM  FACILITATOR: Sulma Arshad LICSW  TOPIC:  Process Group    Diagnoses:  296.32 (F33.1) Major Depressive Disorder, Recurrent Episode, Moderate _ and With anxious distress  300.02 (F41.1) Generalized Anxiety Disorder      Adult Mental Health Day Treatment  TRACK: 5A    NUMBER OF PARTICIPANTS: 3    Telemedicine Visit: The patient's condition can be safely assessed and treated via synchronous audio and visual telemedicine encounter.      Reason for Telemedicine Visit: Services only offered telehealth    Originating Site (Patient Location): Patient's home    Distant Site (Provider Location): Provider Remote Setting    Consent:  The patient/guardian has verbally consented to: the potential risks and benefits of telemedicine (video visit) versus in person care; bill my insurance or make self-payment for services provided; and responsibility for payment of non-covered services.     Mode of Communication:  Video Conference via [employer]    As the provider I attest to compliance with applicable laws and regulations related to telemedicine.           Data:    Session content: At the start of this group, patients were invited to check in by identifying themselves, describing their current emotional status, and identifying issues to address in this group.   Area(s) of treatment focus addressed in this session included Symptom Management, Personal Safety and Community Resources/Discharge Planning.  Suzy reported that she sees her oncologist next week.  She plans to talk to him about the sleep disurbance.  She stated that the sleep has decreased in quality as the clonazepam has been decreased.  She stated that she slept for three hours then has been awake since early morning.  She stated that she then finds herself napping for  two hours in the morning and another hour in the afternoon.  Writer suggested that client may also ask her provider for a referral for Cognitive Behavioral therapy for Insomnia at a local sleep center.  Client plans to askfor the medical marijuana license to be renewed.  She stated that her provider stopped prescribing the clonazepam as she bought some in [place] rather than following the treatment plan with her provider.      Therapeutic Interventions/Treatment Strategies:  Psychotherapist offered support, feedback and validation and reinforced use of skills. Treatment modalities used include Cognitive Behavioral Therapy. Interventions include Coping Skills: Discussed meditation skills and addressed ways to implement meditation skills .    Assessment:    Patient response:   Patient responded to session by being attentive and accepting support    Possible barriers to participation / learning include: and no barriers identified    Health Issues:   Yes: cancer in remission, No Psychological Distress       Substance Use Review:   Substance Use: No active concerns identified.    Mental Status/Behavioral Observations  Appearance:   Appropriate   Eye Contact:   Good   Psychomotor Behavior: Normal   Attitude:   Cooperative   Orientation:   All  Speech   Rate / Production: Normal    Volume:  Normal   Mood:    Anxious  Depressed   Affect:    Appropriate   Thought Content:   Clear  Thought Form:  Coherent  Logical     Insight:    Good     Plan:     Safety Plan: No current safety concerns identified.  Recommended that patient call 911 or go to the local ED should there be a change in any of these risk factors.     Barriers to treatment: None identified    Patient Contracts (see media tab):  None    Substance Use: Not addressed in session     Continue or Discharge: Patient will continue in Adult Day Treatment (ADT)  as planned. Patient is likely to benefit from learning and using skills as they work toward the goals identified  in their treatment plan.      Sulma Arshad, [facility]  July 2, 2020

## 2020-07-02 NOTE — GROUP NOTE
Psychotherapy Group Note    PATIENT'S NAME: Suzy Rodríguez  MRN:   [id number]  :   1985  ACCT. NUMBER: [id number]  DATE OF SERVICE: 20  START TIME: 11:00 AM  END TIME: 11:50 AM  FACILITATOR: Sulma Metzger LICSW  TOPIC:  EBP Group: Enhanced Mindfulness  Adult Mental Health Day Treatment  TRACK: 5A    NUMBER OF PARTICIPANTS: 3    Summary of Group / Topics Discussed:  Enhanced Mindfulness: Body and Mind Integration: Patients received an overview and education regarding the importance of including the body in the management of emotional health and self-care and as a direct route to mindfulness practice.  Patients discussed various ways in which the body can serve as an informant to their physical and emotional experiences/need. Patients discussed the body as a direct link to the present moment and to mindfulness practice.  Patients discussed current relationship with body, self-awareness, mindfulness practice and barriers to connection with body.  Patients were guided through breath work and movement to facilitate greater self-awareness, grounding, self-expression, and connection to other.  Patients discussed how the experiential could be applied to better manage mental health and develop walker connection to self.    Patient Session Goals / Objectives:    Identify how movement awareness could be used for grounding, stress management, self-expression, connection to other and self-regulation    Improved awareness of breath and movement preferences    Identify how movement and the body is used in mindfulness practice    Reflect on use of these practices in everyday life.    Identify barriers to attending to body    Telemedicine Visit: The patient's condition can be safely assessed and treated via synchronous audio and visual telemedicine encounter.          Reason for Telemedicine Visit: Services only offered telehealth and covid19        Originating Site (Patient Location): Patient's  home        Distant Site (Provider Location): Provider Remote Setting        Consent:  The patient/guardian has verbally consented to: the potential risks and benefits of telemedicine (video visit) versus in person care; bill my insurance or make self-payment for services provided; and responsibility for payment of non-covered services.         Mode of Communication:  Video Conference via [employer]        As the provider I attest to compliance with applicable laws and regulations related to telemedicine.         Patient Participation / Response:  Fully participated with the group by sharing personal reflections / insights and openly received / provided feedback with other participants.    Demonstrated understanding of topics discussed through group discussion and participation and Practiced skills in session    Treatment Plan:  Patient has a current master individualized treatment plan.  See Epic treatment plan for more information.    TIM RiveraSW

## 2020-07-06 ENCOUNTER — HOSPITAL ENCOUNTER (OUTPATIENT)
Dept: BEHAVIORAL HEALTH | Facility: CLINIC | Age: 35
End: 2020-07-06
Attending: PSYCHIATRY & NEUROLOGY
Payer: COMMERCIAL

## 2020-07-06 PROCEDURE — 90853 GROUP PSYCHOTHERAPY: CPT | Mod: 95 | Performed by: SOCIAL WORKER

## 2020-07-06 PROCEDURE — G0177 OPPS/PHP; TRAIN & EDUC SERV: HCPCS | Mod: 95

## 2020-07-06 NOTE — GROUP NOTE
Process Group Note    PATIENT'S NAME: Suzy Rodríguez  MRN:   [id number]  :   1985  ACCT. NUMBER: [id number]  DATE OF SERVICE: 20  START TIME:  9:00 AM  END TIME:  9:50 AM  FACILITATOR: Sulma Arshad LICSW  TOPIC:  Process Group    Diagnoses:  296.32 (F33.1) Major Depressive Disorder, Recurrent Episode, Moderate _ and With anxious distress  300.02 (F41.1) Generalized Anxiety Disorder      Adult Mental Health Day Treatment  TRACK: 5A    NUMBER OF PARTICIPANTS: 4    Telemedicine Visit: The patient's condition can be safely assessed and treated via synchronous audio and visual telemedicine encounter.      Reason for Telemedicine Visit: Services only offered telehealth    Originating Site (Patient Location): Patient's home    Distant Site (Provider Location): Provider Remote Setting    Consent:  The patient/guardian has verbally consented to: the potential risks and benefits of telemedicine (video visit) versus in person care; bill my insurance or make self-payment for services provided; and responsibility for payment of non-covered services.     Mode of Communication:  Video Conference via [employer]    As the provider I attest to compliance with applicable laws and regulations related to telemedicine.           Data:    Session content: At the start of this group, patients were invited to check in by identifying themselves, describing their current emotional status, and identifying issues to address in this group.   Area(s) of treatment focus addressed in this session included Symptom Management, Personal Safety and Community Resources/Discharge Planning.  Suzy reported having a quite  celebration with her family, which she enjoyed.  She stated that her mood was less anxious and less depressed over the weekend.  She reported high anxiety over upcoming family cabin weekend.  She stated that she is looking forward to her sister's family visiting, but feels overwhelmed buy  having 18 family members at the cabin.  She stated that her family stays at a camper so there will be a place for her to retreat to as needed. Client denied suicidal ideation, intent and plan.     Therapeutic Interventions/Treatment Strategies:  Psychotherapist offered support, feedback and validation and reinforced use of skills. Treatment modalities used include Cognitive Behavioral Therapy. Interventions include Coping Skills: Assisted patient in understanding the purpose of planning / creating / participating / sharing in positive experiences.    Assessment:    Patient response:   Patient responded to session by focusing on goals and being attentive    Possible barriers to participation / learning include: and no barriers identified    Health Issues:   None reported       Substance Use Review:   Substance Use: No active concerns identified.    Mental Status/Behavioral Observations  Appearance:   Appropriate   Eye Contact:   Good   Psychomotor Behavior: Normal   Attitude:   Cooperative   Orientation:   All  Speech   Rate / Production: Normal    Volume:  Normal   Mood:    Anxious  Depressed   Affect:    Appropriate   Thought Content:   Clear  Thought Form:  Coherent  Logical     Insight:    Good     Plan:     Safety Plan: No current safety concerns identified.  Recommended that patient call 911 or go to the local ED should there be a change in any of these risk factors.     Barriers to treatment: None identified    Patient Contracts (see media tab):  None    Substance Use: Not addressed in session     Continue or Discharge: Patient will continue in Adult Day Treatment (ADT)  as planned. Patient is likely to benefit from learning and using skills as they work toward the goals identified in their treatment plan.      Sulma Arshad, [facility]  July 6, 2020

## 2020-07-06 NOTE — GROUP NOTE
Psychoeducation Group Note    PATIENT'S NAME: Suzy Rodríguez  MRN:   [id number]  :   1985  [id number]  DATE OF SERVICE: 20  START TIME: 11:00 AM  END TIME: 11:50 AM  FACILITATOR: Genny Kelley RN  TOPIC: NELSON RN Group: Mental Health Maintenance  Telemedicine Visit: The patient's condition can be safely assessed and treated via synchronous audio and visual telemedicine encounter.      Reason for Telemedicine Visit:  COVID19    Originating Site (Patient Location): Patient's home    Distant Site (Provider Location): Provider Remote Setting    Consent:  The patient/guardian has verbally consented to: the potential risks and benefits of telemedicine (video visit) versus in person care; bill my insurance or make self-payment for services provided; and responsibility for payment of non-covered services.     Mode of Communication:  Video Conference via [employer]    As the provider I attest to compliance with applicable laws and regulations related to telemedicine.      Adult Mental Health Day Treatment  TRACK: 5A    NUMBER OF PARTICIPANTS: 4    Summary of Group / Topics Discussed:  Mental Health Maintenance:  Stigma: In this group patients explored stigma surrounding a mental health diagnosis.  The group discussed the way stigma impacts their own life, and discussed strategies to reduce. The relationship between physical and mental health were also explored in the context of healthcare access, treatment, and support.    Patient Session Goals / Objectives:  ? Patients identified the importance of practicing emotional hygiene  ? Patients identified ways to decrease the  impact of stigma in their own life          Patient Participation / Response:  Fully participated with the group by sharing personal reflections / insights and openly received / provided feedback with other participants.    Demonstrated understanding of topics discussed through group discussion and participation and  Identified / Expressed personal readiness to practice skills    Treatment Plan:  Patient has a current master individualized treatment plan.  See Epic treatment plan for more information.    Genny Kelley RN

## 2020-07-06 NOTE — GROUP NOTE
Psychotherapy Group Note    PATIENT'S NAME: Suzy Rodríguez  MRN:   [id number]  :   1985  ACCT. NUMBER: [id number]  DATE OF SERVICE: 20  START TIME: 10:00 AM  END TIME: 10:50 AM  FACILITATOR: Sulma Arshad LICSW  TOPIC: MH EBP Group: Self-Awareness  Adult Mental Health Day Treatment  TRACK: 5A    NUMBER OF PARTICIPANTS: 4    Summary of Group / Topics Discussed:  Self-Awareness: Values: Patients identified personal values by examining development of their current values and how their values influence their daily functioning and life choices. Patients explored the impact of their values on their thoughts, feelings, and actions. Patients discussed definition of personal values and how they develop and change over time. The goal is to help patients reconcile value conflicts and achieve balance and flexibility to improve mood and daily functioning.     Patient Session Goals / Objectives:    Examined development of values and impact of values on functioning    Identified and prioritized important values related to current well-being     Identified strategies to change or enhance values to positively impact symptoms    Assisted patients to find ways to adapt functioning to better fit their values    Telemedicine Visit: The patient's condition can be safely assessed and treated via synchronous audio and visual telemedicine encounter.      Reason for Telemedicine Visit: Patient has requested telehealth visit    Originating Site (Patient Location): Patient's home    Distant Site (Provider Location): Provider Remote Setting    Consent:  The patient/guardian has verbally consented to: the potential risks and benefits of telemedicine (video visit) versus in person care; bill my insurance or make self-payment for services provided; and responsibility for payment of non-covered services.     Mode of Communication:  Video Conference via [employer]    As the provider I attest to compliance with applicable  laws and regulations related to telemedicine.         Patient Participation / Response:  Fully participated with the group by sharing personal reflections / insights and openly received / provided feedback with other participants.    Demonstrated understanding of topics discussed through group discussion and participation    Treatment Plan:  Patient has a current master individualized treatment plan.  See Epic treatment plan for more information.    Sulma Arshad, LICSW

## 2020-07-07 ENCOUNTER — HOSPITAL ENCOUNTER (OUTPATIENT)
Dept: BEHAVIORAL HEALTH | Facility: CLINIC | Age: 35
End: 2020-07-07
Attending: PSYCHIATRY & NEUROLOGY
Payer: COMMERCIAL

## 2020-07-07 ENCOUNTER — TELEPHONE (OUTPATIENT)
Dept: BEHAVIORAL HEALTH | Facility: CLINIC | Age: 35
End: 2020-07-07

## 2020-07-07 DIAGNOSIS — E27.40 ADRENAL INSUFFICIENCY (H): ICD-10-CM

## 2020-07-07 PROCEDURE — 90853 GROUP PSYCHOTHERAPY: CPT | Mod: GT | Performed by: SOCIAL WORKER

## 2020-07-07 NOTE — GROUP NOTE
Process Group Note    PATIENT'S NAME: Suzy Rodríguez  MRN:   [id number]  :   1985  [id number]  DATE OF SERVICE: 20  START TIME:  9:00 AM  END TIME:  9:50 AM  FACILITATOR: Sulma Arshad LICSW  TOPIC:  Process Group    Diagnoses:  296.32 (F33.1) Major Depressive Disorder, Recurrent Episode, Moderate _ and With anxious distress  300.02 (F41.1) Generalized Anxiety Disorder      Adult Mental Health Day Treatment  TRACK: 5A    NUMBER OF PARTICIPANTS: 3    Telemedicine Visit: The patient's condition can be safely assessed and treated via synchronous audio and visual telemedicine encounter.      Reason for Telemedicine Visit: Services only offered telehealth    Originating Site (Patient Location): Patient's home    Distant Site (Provider Location): Provider Remote Setting    Consent:  The patient/guardian has verbally consented to: the potential risks and benefits of telemedicine (video visit) versus in person care; bill my insurance or make self-payment for services provided; and responsibility for payment of non-covered services.     Mode of Communication:  Video Conference via [employer]    As the provider I attest to compliance with applicable laws and regulations related to telemedicine.           Data:    Session content: At the start of this group, patients were invited to check in by identifying themselves, describing their current emotional status, and identifying issues to address in this group.   Area(s) of treatment focus addressed in this session included Symptom Management, Personal Safety and Community Resources/Discharge Planning.  uSzy reported low motivation and low energy ever since the cancer diagnosis two years ago.  She stated that she used to like to do puzzles, to read, and to bon.  She stated that she has low concentration.  Client mentioned yesterday concerns about her life expectancy.  Writer invited her to share that information with the group.   She stated that she beat the inially two year life expectancy and now has a 2-3 year life expectancy from the two year jose.  She stated that when she tries to do hobbies the thoughts about her life expectancy get worse.  She stated that she asks her  to take her out for an activity to distract.  She stated that she has also started a journal for the kids.  Peers offered feedback on the use of numbing or avoiding coping skills.      Therapeutic Interventions/Treatment Strategies:  Psychotherapist offered support, feedback and validation and reinforced use of skills. Treatment modalities used include Cognitive Behavioral Therapy. Interventions include Behavioral Activation: Encouraged strategies to reduce individual procrastination and increase motivation by increasing goal-directed activities to enhance mood and reduce symptoms..    Assessment:    Patient response:   Patient responded to session by listening, focusing on goals and being attentive    Possible barriers to participation / learning include: and no barriers identified    Health Issues:   None reported       Substance Use Review:   Substance Use: No active concerns identified.    Mental Status/Behavioral Observations  Appearance:   Appropriate   Eye Contact:   Good   Psychomotor Behavior: Normal   Attitude:   Cooperative   Orientation:   All  Speech   Rate / Production: Normal    Volume:  Normal   Mood:    Anxious  Depressed   Affect:    Appropriate   Thought Content:   Clear  Thought Form:  Coherent  Logical     Insight:    Good     Plan:     Safety Plan: No current safety concerns identified.  Recommended that patient call 911 or go to the local ED should there be a change in any of these risk factors.     Barriers to treatment: None identified    Patient Contracts (see media tab):  None    Substance Use: Not addressed in session     Continue or Discharge: Patient will continue in Adult Day Treatment (ADT)  as planned. Patient is likely to  benefit from learning and using skills as they work toward the goals identified in their treatment plan.      Sulma Arshad, [facility]  July 7, 2020

## 2020-07-08 ENCOUNTER — TELEPHONE (OUTPATIENT)
Dept: BEHAVIORAL HEALTH | Facility: CLINIC | Age: 35
End: 2020-07-08

## 2020-07-08 NOTE — TELEPHONE ENCOUNTER
Writer called to communicate regarding Dr. Johnson's chart review and pt's MTHFR gene mutation. Writer called to make sure pt was connected to her drs and understood their recommendations. Pt said she is being followed by her endocrinologist and oncologist and is given supplements. She has no questions.    Genny Kelley RN on 7/8/2020 at 3:43 PM

## 2020-07-09 ENCOUNTER — HOSPITAL ENCOUNTER (OUTPATIENT)
Dept: BEHAVIORAL HEALTH | Facility: CLINIC | Age: 35
End: 2020-07-09
Attending: PSYCHIATRY & NEUROLOGY
Payer: COMMERCIAL

## 2020-07-09 PROCEDURE — 90853 GROUP PSYCHOTHERAPY: CPT | Mod: GT | Performed by: SOCIAL WORKER

## 2020-07-09 PROCEDURE — 90853 GROUP PSYCHOTHERAPY: CPT | Mod: GT | Performed by: COUNSELOR

## 2020-07-09 RX ORDER — HYDROCORTISONE 10 MG/1
TABLET ORAL
Qty: 500 TABLET | Refills: 3 | Status: SHIPPED | OUTPATIENT
Start: 2020-07-09 | End: 2021-01-29

## 2020-07-09 NOTE — GROUP NOTE
Psychoeducation Group Note    PATIENT'S NAME: Suzy Rodríguez  MRN:   [id number]  :   1985  ACCT. NUMBER: [id number]  DATE OF SERVICE: 20  START TIME: 11:00 AM  END TIME: 11:40 AM  FACILITATOR: Shaina Leon LPCC  TOPIC:  RN Group: Mind/Body Practice & Complementary  Adult Mental Health Day Treatment  TRACK: 5A    NUMBER OF PARTICIPANTS: 4    Summary of Group / Topics Discussed:  Mind/Body Practice & Complementary Therapies:  Progressive Muscle Relaxation: In addition to affecting our mood and behavior, psychological stress can cause a myriad of physical symptoms in our body. Patients were educated on these effects and guided to increased self-awareness of how stress affects their body. The purpose, benefits, history, and techniques of progressive muscle relaxation were discussed. In an instructor guided experiential, patients were guided to practice PMR to help reduce physical symptoms of psychological stress and achieve a more balanced feeling of well-being.    Patient Session Goals / Objectives:  ? Identified physiological symptoms of stress on the body  ? Listed & Explained the purpose and benefits to practicing PMR   ? Practiced progressive muscle relaxation experiential    Telemedicine Visit: The patient's condition can be safely assessed and treated via synchronous audio and visual telemedicine encounter.      Reason for Telemedicine Visit: Services only offered telehealth and due to COVID-19    Originating Site (Patient Location): Patient's home    Distant Site (Provider Location): Provider Remote Setting    Consent:  The patient/guardian has verbally consented to: the potential risks and benefits of telemedicine (video visit) versus in person care; bill my insurance or make self-payment for services provided; and responsibility for payment of non-covered services.     Mode of Communication:  Video Conference via [employer]    As the provider I attest to compliance with applicable  laws and regulations related to telemedicine.        Patient Participation / Response:  Fully participated with the group by sharing personal reflections / insights and openly received / provided feedback with other participants.    Identified / Expressed personal readiness to practice skills    Treatment Plan:  Patient has a current master individualized treatment plan.  See Epic treatment plan for more information.    Shaina Leon, [facility]

## 2020-07-09 NOTE — GROUP NOTE
Psychotherapy Group Note    PATIENT'S NAME: Suzy Rodríguez  MRN:   [id number]  :   1985  ACCT. NUMBER: [id number]  DATE OF SERVICE: 20  START TIME: 10:00 AM  END TIME: 10:50 AM  FACILITATOR: Sulma Arshad LICSW  TOPIC: MH EBP Group: Self-Awareness  Adult Mental Health Day Treatment  TRACK: 5A    NUMBER OF PARTICIPANTS: 4    Summary of Group / Topics Discussed:  Self-Awareness: Self-Compassion: Patients received overview of key concepts in developing self-compassion. Patients discussed mindfulness, self-kindness, and finding common humanity. Patients identified their current approach to problems in their lives and learned skills for increasing self-compassion. Patients identified ways they can put self-compassion skills into practice and problem solve barriers to application of skills.     Patient Session Goals / Objectives:    [place] components of self-compassion    Identify ways to practice self-compassion in daily life    Problem solve barriers to self-compassion practice    Telemedicine Visit: The patient's condition can be safely assessed and treated via synchronous audio and visual telemedicine encounter.      Reason for Telemedicine Visit: Services only offered telehealth    Originating Site (Patient Location): Patient's home    Distant Site (Provider Location): Provider Remote Setting    Consent:  The patient/guardian has verbally consented to: the potential risks and benefits of telemedicine (video visit) versus in person care; bill my insurance or make self-payment for services provided; and responsibility for payment of non-covered services.     Mode of Communication:  Video Conference via [employer]    As the provider I attest to compliance with applicable laws and regulations related to telemedicine.       Patient Participation / Response:  Fully participated with the group by sharing personal reflections / insights and openly received / provided feedback with other  participants.    Demonstrated understanding of values, strengths, and challenges to learn about themselves    Treatment Plan:  Patient has a current master individualized treatment plan.  See Epic treatment plan for more information.    Sulma Arshad, [facility]

## 2020-07-09 NOTE — GROUP NOTE
Process Group Note    PATIENT'S NAME: Suzy Rodríguez  MRN:   [id number]  :   1985  [id number]  DATE OF SERVICE: 20  START TIME:  9:00 AM  END TIME:  9:50 AM  FACILITATOR: Sulma Arshad LICSW  TOPIC:  Process Group    Diagnoses:  296.32 (F33.1) Major Depressive Disorder, Recurrent Episode, Moderate _ and With anxious distress  300.02 (F41.1) Generalized Anxiety Disorder      Adult Mental Health Day Treatment  TRACK: 5A    NUMBER OF PARTICIPANTS: 4    Telemedicine Visit: The patient's condition can be safely assessed and treated via synchronous audio and visual telemedicine encounter.      Reason for Telemedicine Visit: Services only offered telehealth    Originating Site (Patient Location): Patient's home    Distant Site (Provider Location): Provider Remote Setting    Consent:  The patient/guardian has verbally consented to: the potential risks and benefits of telemedicine (video visit) versus in person care; bill my insurance or make self-payment for services provided; and responsibility for payment of non-covered services.     Mode of Communication:  Video Conference via [employer]    As the provider I attest to compliance with applicable laws and regulations related to telemedicine.           Data:    Session content: At the start of this group, patients were invited to check in by identifying themselves, describing their current emotional status, and identifying issues to address in this group.   Area(s) of treatment focus addressed in this session included Symptom Management, Personal Safety and Community Resources/Discharge Planning.  Suzy reported positive mood today.  She stated that she is happy as the oncologist who specializes in adrenal cancer gave her a second opinion that at this time her scans show postive results and she is not needing to have a life expectancy estimate at this time.  She stated that she is worried about the family trip to the cabin but  has coping strategies identified.  Client will miss group on Monday and Tuesday due to the cabin trip.      Therapeutic Interventions/Treatment Strategies:  Psychotherapist offered support, feedback and validation and reinforced use of skills. Treatment modalities used include Cognitive Behavioral Therapy. Interventions include Coping Skills: Facilitated understanding of  what factors may contribute to symptom relapse and skills plan to manage symptom relapse .    Assessment:    Patient response:   Patient responded to session by listening and focusing on goals    Possible barriers to participation / learning include: and no barriers identified    Health Issues:   Yes: adrenal cancer, No Psychological Distress       Substance Use Review:   Substance Use: No active concerns identified.    Mental Status/Behavioral Observations  Appearance:   Appropriate   Eye Contact:   Good   Psychomotor Behavior: Normal   Attitude:   Cooperative   Orientation:   All  Speech   Rate / Production: Normal    Volume:  Normal   Mood:    Anxious  Normal  Affect:    Appropriate   Thought Content:   Clear  Thought Form:  Coherent  Logical     Insight:    Good     Plan:     Safety Plan: No current safety concerns identified.  Recommended that patient call 911 or go to the local ED should there be a change in any of these risk factors.     Barriers to treatment: None identified    Patient Contracts (see media tab):  None    Substance Use: Not addressed in session     Continue or Discharge: Patient will continue in Adult Day Treatment (ADT)  as planned. Patient is likely to benefit from learning and using skills as they work toward the goals identified in their treatment plan.      Sulma Arshad, [facility] HospitalSW  July 9, 2020

## 2020-07-09 NOTE — TELEPHONE ENCOUNTER
hydrocortisone (CORTEF) 10 MG tablet     Last Written Prescription Date:  1/6/2020  Last Fill Quantity: 400,   # refills: 3  Last Office Visit : 6/22/2020  Future Office visit:  None    Routing refill request to provider for review/approval because:  Clarification of orders Please  Is the order 2.5 Tabs (AM), 1.5 Tabs (PM)   Or   Is it 3 Tabs (AM),  2 Tabs (PM)  Which dose would the Provider like for Pt care.    Veena Alvarez RN  Central Triage Red Flags/Med Refills

## 2020-07-13 RX ORDER — ALBUTEROL SULFATE 90 UG/1
1-2 AEROSOL, METERED RESPIRATORY (INHALATION)
Status: CANCELLED
Start: 2020-07-25

## 2020-07-13 RX ORDER — MEPERIDINE HYDROCHLORIDE 25 MG/ML
25 INJECTION INTRAMUSCULAR; INTRAVENOUS; SUBCUTANEOUS EVERY 30 MIN PRN
Status: CANCELLED | OUTPATIENT
Start: 2020-07-25

## 2020-07-13 RX ORDER — ALBUTEROL SULFATE 0.83 MG/ML
2.5 SOLUTION RESPIRATORY (INHALATION)
Status: CANCELLED | OUTPATIENT
Start: 2020-07-25

## 2020-07-13 RX ORDER — SODIUM CHLORIDE 9 MG/ML
1000 INJECTION, SOLUTION INTRAVENOUS CONTINUOUS PRN
Status: CANCELLED
Start: 2020-07-25

## 2020-07-13 RX ORDER — DIPHENHYDRAMINE HYDROCHLORIDE 50 MG/ML
50 INJECTION INTRAMUSCULAR; INTRAVENOUS
Status: CANCELLED
Start: 2020-07-25

## 2020-07-13 RX ORDER — EPINEPHRINE 0.3 MG/.3ML
0.3 INJECTION SUBCUTANEOUS EVERY 5 MIN PRN
Status: CANCELLED | OUTPATIENT
Start: 2020-07-25

## 2020-07-13 RX ORDER — EPINEPHRINE 1 MG/ML
0.3 INJECTION, SOLUTION, CONCENTRATE INTRAVENOUS EVERY 5 MIN PRN
Status: CANCELLED | OUTPATIENT
Start: 2020-07-25

## 2020-07-14 ENCOUNTER — AMBULATORY - HEALTHEAST (OUTPATIENT)
Dept: BEHAVIORAL HEALTH | Facility: CLINIC | Age: 35
End: 2020-07-14

## 2020-07-14 ENCOUNTER — COMMUNICATION - HEALTHEAST (OUTPATIENT)
Dept: BEHAVIORAL HEALTH | Facility: CLINIC | Age: 35
End: 2020-07-14

## 2020-07-14 DIAGNOSIS — F41.1 GENERALIZED ANXIETY DISORDER: ICD-10-CM

## 2020-07-16 DIAGNOSIS — Z98.84 GASTRIC BYPASS STATUS FOR OBESITY: ICD-10-CM

## 2020-07-16 DIAGNOSIS — E21.3 HYPERPARATHYROIDISM (H): ICD-10-CM

## 2020-07-16 DIAGNOSIS — C74.01 ADRENAL CORTEX CANCER, RIGHT (H): ICD-10-CM

## 2020-07-16 DIAGNOSIS — E27.40 ADRENAL INSUFFICIENCY (H): ICD-10-CM

## 2020-07-16 DIAGNOSIS — C74.01 MALIGNANT NEOPLASM OF CORTEX OF RIGHT ADRENAL GLAND (H): ICD-10-CM

## 2020-07-16 LAB
ALBUMIN SERPL-MCNC: 2.7 G/DL (ref 3.4–5)
ALP SERPL-CCNC: 71 U/L (ref 40–150)
ALT SERPL W P-5'-P-CCNC: 26 U/L (ref 0–50)
ANION GAP SERPL CALCULATED.3IONS-SCNC: 3 MMOL/L (ref 3–14)
AST SERPL W P-5'-P-CCNC: 18 U/L (ref 0–45)
BASOPHILS # BLD AUTO: 0 10E9/L (ref 0–0.2)
BASOPHILS NFR BLD AUTO: 1 %
BILIRUB SERPL-MCNC: 0.2 MG/DL (ref 0.2–1.3)
BUN SERPL-MCNC: 10 MG/DL (ref 7–30)
CALCIUM SERPL-MCNC: 8.1 MG/DL (ref 8.5–10.1)
CHLORIDE SERPL-SCNC: 107 MMOL/L (ref 94–109)
CHOLEST SERPL-MCNC: 166 MG/DL
CO2 SERPL-SCNC: 29 MMOL/L (ref 20–32)
CORTIS SERPL-MCNC: 2.9 UG/DL (ref 4–22)
CREAT SERPL-MCNC: 0.74 MG/DL (ref 0.52–1.04)
DIFFERENTIAL METHOD BLD: NORMAL
EOSINOPHIL # BLD AUTO: 0.1 10E9/L (ref 0–0.7)
EOSINOPHIL NFR BLD AUTO: 2.7 %
ERYTHROCYTE [DISTWIDTH] IN BLOOD BY AUTOMATED COUNT: 14.6 % (ref 10–15)
FERRITIN SERPL-MCNC: 12 NG/ML (ref 12–150)
GFR SERPL CREATININE-BSD FRML MDRD: >90 ML/MIN/{1.73_M2}
GLUCOSE SERPL-MCNC: 75 MG/DL (ref 70–99)
HCT VFR BLD AUTO: 41.9 % (ref 35–47)
HDLC SERPL-MCNC: 105 MG/DL
HGB BLD-MCNC: 13.4 G/DL (ref 11.7–15.7)
IMM GRANULOCYTES # BLD: 0 10E9/L (ref 0–0.4)
IMM GRANULOCYTES NFR BLD: 0.2 %
LDLC SERPL CALC-MCNC: 42 MG/DL
LYMPHOCYTES # BLD AUTO: 1.5 10E9/L (ref 0.8–5.3)
LYMPHOCYTES NFR BLD AUTO: 35.9 %
MCH RBC QN AUTO: 31.1 PG (ref 26.5–33)
MCHC RBC AUTO-ENTMCNC: 32 G/DL (ref 31.5–36.5)
MCV RBC AUTO: 97 FL (ref 78–100)
MISCELLANEOUS TEST: NORMAL
MONOCYTES # BLD AUTO: 0.3 10E9/L (ref 0–1.3)
MONOCYTES NFR BLD AUTO: 7.8 %
NEUTROPHILS # BLD AUTO: 2.1 10E9/L (ref 1.6–8.3)
NEUTROPHILS NFR BLD AUTO: 52.4 %
NONHDLC SERPL-MCNC: 61 MG/DL
NRBC # BLD AUTO: 0 10*3/UL
NRBC BLD AUTO-RTO: 0 /100
PLATELET # BLD AUTO: 183 10E9/L (ref 150–450)
POTASSIUM SERPL-SCNC: 3.4 MMOL/L (ref 3.4–5.3)
PROT SERPL-MCNC: 5.4 G/DL (ref 6.8–8.8)
PTH-INTACT SERPL-MCNC: 59 PG/ML (ref 18–80)
RBC # BLD AUTO: 4.31 10E12/L (ref 3.8–5.2)
SODIUM SERPL-SCNC: 139 MMOL/L (ref 133–144)
T4 FREE SERPL-MCNC: 0.85 NG/DL (ref 0.76–1.46)
TRIGL SERPL-MCNC: 93 MG/DL
TSH SERPL DL<=0.005 MIU/L-ACNC: 0.72 MU/L (ref 0.4–4)
WBC # BLD AUTO: 4.1 10E9/L (ref 4–11)

## 2020-07-16 NOTE — ADDENDUM NOTE
Encounter addended by: Sulma Arshad [facility] on: 7/16/2020 4:18 PM   Actions taken: Clinical Note Signed

## 2020-07-16 NOTE — PROGRESS NOTES
Acknowledgement of Current Treatment Plan       I have reviewed my treatment plan with my therapist  on 5/28/20.   I agree with the plan as it is written in the electronic health record. (5A)    Name:      Signature:  Suzy Rodríguez Unable to sign due to COVID 19 epidemic     Dr Jorge Johnson MD  Psychiatrist    Dr. Antoinette Morton, [facility],     Sulma Arshad, [facility]  Psychotherapist Sulma Arshad, [facility], [facility]

## 2020-07-16 NOTE — PROGRESS NOTES
Acknowledgement of Current Treatment Plan       I have reviewed my treatment plan with my therapist  on 5/28/20.   I agree with the plan as it is written in the electronic health record. (5A)    Name:      Signature:  Suzy Rodríguez Unable to sign due to COVID 19 epidemic     Dr Jorge Johnson MD  Psychiatrist    Dr. Antoinette Morton, [facility],     Sulma Arshad, [facility]  Psychotherapist Sulma Arshad, [facility], [facility]

## 2020-07-17 DIAGNOSIS — C74.01 MALIGNANT NEOPLASM OF CORTEX OF RIGHT ADRENAL GLAND (H): Primary | ICD-10-CM

## 2020-07-17 LAB
ACTH PLAS-MCNC: 16 PG/ML
DEPRECATED CALCIDIOL+CALCIFEROL SERPL-MC: <51 UG/L (ref 20–75)
DHEA-S SERPL-MCNC: <15 UG/DL (ref 35–430)
VITAMIN D2 SERPL-MCNC: <5 UG/L
VITAMIN D3 SERPL-MCNC: 46 UG/L

## 2020-07-18 LAB — RENIN PLAS-CCNC: 0.6 NG/ML/HR

## 2020-07-20 ENCOUNTER — HOSPITAL ENCOUNTER (OUTPATIENT)
Dept: BEHAVIORAL HEALTH | Facility: CLINIC | Age: 35
End: 2020-07-20
Attending: PSYCHIATRY & NEUROLOGY
Payer: COMMERCIAL

## 2020-07-20 PROCEDURE — G0177 OPPS/PHP; TRAIN & EDUC SERV: HCPCS | Mod: 95

## 2020-07-20 PROCEDURE — 90853 GROUP PSYCHOTHERAPY: CPT | Mod: GT | Performed by: SOCIAL WORKER

## 2020-07-20 NOTE — GROUP NOTE
Psychoeducation Group Note    PATIENT'S NAME: Suzy Rodríguez  MRN:   [id number]  :   1985  [id number]  DATE OF SERVICE: 20  START TIME: 11:00 AM  END TIME: 11:50 AM  FACILITATOR: Genny Kelley RN  TOPIC: NELSON RN Group: Mind/Body Practice & Complementary  Telemedicine Visit: The patient's condition can be safely assessed and treated via synchronous audio and visual telemedicine encounter.      Reason for Telemedicine Visit:  covid19    Originating Site (Patient Location): Patient's home    Distant Site (Provider Location): Provider Remote Setting    Consent:  The patient/guardian has verbally consented to: the potential risks and benefits of telemedicine (video visit) versus in person care; bill my insurance or make self-payment for services provided; and responsibility for payment of non-covered services.     Mode of Communication:  Video Conference via [employer]    As the provider I attest to compliance with applicable laws and regulations related to telemedicine.      Adult Mental Health Day Treatment  TRACK: 5A    NUMBER OF PARTICIPANTS: 5    Summary of Group / Topics Discussed:  Mind/Body Practice & Complementary Therapies:  Progressive Muscle Relaxation: In addition to affecting our mood and behavior, psychological stress can cause a myriad of physical symptoms in our body. Patients were educated on these effects and guided to increased self-awareness of how stress affects their body. The purpose, benefits, history, and techniques of progressive muscle relaxation were discussed. In an instructor guided experiential, patients were guided to practice PMR to help reduce physical symptoms of psychological stress and achieve a more balanced feeling of well-being.    Patient Session Goals / Objectives:  ? Identified physiological symptoms of stress on the body  ? Listed & Explained the purpose and benefits to practicing PMR   ? Practiced progressive muscle relaxation  experiential        Patient Participation / Response:  Moderately participated, sharing some personal reflections / insights and adequately adequately received / provided feedback with other participants.    Verbalized understanding of Mind/Body Practice & Complementary Therapies topic    Treatment Plan:  Patient has a current master individualized treatment plan.  See Epic treatment plan for more information.    Genny Kelley RN

## 2020-07-20 NOTE — GROUP NOTE
Psychotherapy Group Note    PATIENT'S NAME: Suzy Rodríguez  MRN:   [id number]  :   1985  ACCT. NUMBER: [id number]  DATE OF SERVICE: 20  START TIME: 10:00 AM  END TIME: 10:50 AM  FACILITATOR: Sulma Arshad LICSW  TOPIC: MH EBP Group: Cognitive Restructuring  Adult Mental Health Day Treatment  TRACK: 5A    NUMBER OF PARTICIPANTS: 6    Summary of Group / Topics Discussed:  Cognitive Restructuring: Distortions: Patients received an overview of how to identify common cognitive distortions. Patients will explore alternatives to cognitive distortions and practice challenging their negative thought patterns. The goal is to help patients target modify ineffective thought patterns.     Patient Session Goals / Objectives:    Familiarized self with ineffective / unhealthy thoughts and how they develop.      Explored impact of ineffective thoughts / distortions on mood and activity    Formulated new neutral/positive alternatives to challenge less helpful / ineffective thoughts.    Practiced and plan to apply in daily life    Telemedicine Visit: The patient's condition can be safely assessed and treated via synchronous audio and visual telemedicine encounter.      Reason for Telemedicine Visit: Patient has requested telehealth visit    Originating Site (Patient Location): Patient's home    Distant Site (Provider Location): Provider Remote Setting    Consent:  The patient/guardian has verbally consented to: the potential risks and benefits of telemedicine (video visit) versus in person care; bill my insurance or make self-payment for services provided; and responsibility for payment of non-covered services.     Mode of Communication:  Video Conference via [employer]    As the provider I attest to compliance with applicable laws and regulations related to telemedicine.                Patient Participation / Response:  Fully participated with the group by sharing personal reflections / insights and openly  received / provided feedback with other participants.    Expressed understanding of the relationship between behaviors, thoughts, and feelings    Treatment Plan:  Patient has a current master individualized treatment plan.  See Epic treatment plan for more information.    Sulma Arshad, [facility]

## 2020-07-20 NOTE — GROUP NOTE
Process Group Note    PATIENT'S NAME: Suzy Rodríguez  MRN:   [id number]  :   1985  ACCT. NUMBER: [id number]  DATE OF SERVICE: 20  START TIME:  9:00 AM  END TIME:  9:50 AM  FACILITATOR: Sulma Arshad LICSW  TOPIC:  Process Group    Diagnoses:  296.32 (F33.1) Major Depressive Disorder, Recurrent Episode, Moderate _ and With anxious distress  300.02 (F41.1) Generalized Anxiety Disorder      Adult Mental Health Day Treatment  TRACK: 5A    NUMBER OF PARTICIPANTS: 6    Telemedicine Visit: The patient's condition can be safely assessed and treated via synchronous audio and visual telemedicine encounter.      Reason for Telemedicine Visit: Services only offered telehealth    Originating Site (Patient Location): Patient's home    Distant Site (Provider Location): Provider Remote Setting    Consent:  The patient/guardian has verbally consented to: the potential risks and benefits of telemedicine (video visit) versus in person care; bill my insurance or make self-payment for services provided; and responsibility for payment of non-covered services.     Mode of Communication:  Video Conference via [employer]    As the provider I attest to compliance with applicable laws and regulations related to telemedicine.           Data:    Session content: At the start of this group, patients were invited to check in by identifying themselves, describing their current emotional status, and identifying issues to address in this group.   Area(s) of treatment focus addressed in this session included Symptom Management, Personal Safety and Community Resources/Discharge Planning.  Suzy reported having some improved mood.  She stated that she was less anxious as her bother's family did not join the family cabin trip due to health concerns.  She stated that she enjoyed spending time with her sister and was able to take breaks as she had planned with his group.  She stated that she is concerned about her son's  mental health.  Peer's gave support and feedback.      Therapeutic Interventions/Treatment Strategies:  Psychotherapist offered support, feedback and validation and reinforced use of skills. Treatment modalities used include Cognitive Behavioral Therapy. Interventions include Coping Skills: Assisted patient in understanding the purpose of planning / creating / participating / sharing in positive experiences.    Assessment:    Patient response:   Patient responded to session by focusing on goals and being attentive    Possible barriers to participation / learning include: and no barriers identified    Health Issues:   Yes: cancer in remission, No Psychological Distress       Substance Use Review:   Substance Use: No active concerns identified.    Mental Status/Behavioral Observations  Appearance:   Appropriate   Eye Contact:   Good   Psychomotor Behavior: Normal   Attitude:   Cooperative   Orientation:   All  Speech   Rate / Production: Normal    Volume:  Normal   Mood:    Anxious  Depressed   Affect:    Appropriate   Thought Content:   Clear  Thought Form:  Coherent  Logical     Insight:    Good     Plan:     Safety Plan: No current safety concerns identified.  Recommended that patient call 911 or go to the local ED should there be a change in any of these risk factors.     Barriers to treatment: None identified    Patient Contracts (see media tab):  None    Substance Use: Not addressed in session     Continue or Discharge: Patient will continue in Adult Day Treatment (ADT)  as planned. Patient is likely to benefit from learning and using skills as they work toward the goals identified in their treatment plan.      Sulma Arshad, [facility]  July 20, 2020

## 2020-07-21 ENCOUNTER — HOSPITAL ENCOUNTER (OUTPATIENT)
Dept: BEHAVIORAL HEALTH | Facility: CLINIC | Age: 35
End: 2020-07-21
Attending: PSYCHIATRY & NEUROLOGY
Payer: COMMERCIAL

## 2020-07-21 DIAGNOSIS — C74.01 ADRENAL CORTEX CANCER, RIGHT (H): ICD-10-CM

## 2020-07-21 DIAGNOSIS — E27.40 ADRENAL INSUFFICIENCY (H): ICD-10-CM

## 2020-07-21 LAB
COLLECT DURATION TIME UR: 24 H
CREAT 24H UR-MRATE: 1.42 G/(24.H) (ref 0.8–1.8)
CREAT UR-MCNC: 70 MG/DL
SPECIMEN VOL UR: 2025 ML

## 2020-07-21 PROCEDURE — 90853 GROUP PSYCHOTHERAPY: CPT | Mod: GT | Performed by: SOCIAL WORKER

## 2020-07-21 PROCEDURE — 82570 ASSAY OF URINE CREATININE: CPT | Performed by: INTERNAL MEDICINE

## 2020-07-21 PROCEDURE — 81050 URINALYSIS VOLUME MEASURE: CPT | Performed by: INTERNAL MEDICINE

## 2020-07-21 PROCEDURE — G0177 OPPS/PHP; TRAIN & EDUC SERV: HCPCS | Mod: 95

## 2020-07-21 PROCEDURE — 99000 SPECIMEN HANDLING OFFICE-LAB: CPT | Performed by: INTERNAL MEDICINE

## 2020-07-21 PROCEDURE — 82530 CORTISOL FREE: CPT | Mod: 90 | Performed by: INTERNAL MEDICINE

## 2020-07-21 NOTE — GROUP NOTE
Psychotherapy Group Note    PATIENT'S NAME: Suzy Rodríguez  MRN:   [id number]  :   1985  [id number]  DATE OF SERVICE: 20  START TIME: 11:00 AM  END TIME: 11:50 AM  FACILITATOR: Sulma Arshad LICSW  TOPIC: MH EBP Group: Cognitive Restructuring  Adult Mental Health Day Treatment  TRACK: 5a    NUMBER OF PARTICIPANTS: 4    Summary of Group / Topics Discussed:  Cognitive Restructuring: Core Beliefs: Patients received an overview of what a core belief is, and how they develop. Patients then began to identify their negative core beliefs. Patients worked to modify core beliefs with the goal of improved self-image and functioning.     Patient Session Goals / Objectives:    Familiarize self with the concept of core beliefs and how they develop.      Explore personal core beliefs (positive and negative)    Develop / advance recognition of the connection between negative thoughts and negative core beliefs.    Formulate new neutral/positive core beliefs    Telemedicine Visit: The patient's condition can be safely assessed and treated via synchronous audio and visual telemedicine encounter.      Reason for Telemedicine Visit: Services only offered telehealth    Originating Site (Patient Location): Patient's home    Distant Site (Provider Location): Provider Remote Setting    Consent:  The patient/guardian has verbally consented to: the potential risks and benefits of telemedicine (video visit) versus in person care; bill my insurance or make self-payment for services provided; and responsibility for payment of non-covered services.     Mode of Communication:  Video Conference via [employer]    As the provider I attest to compliance with applicable laws and regulations related to telemedicine.              Patient Participation / Response:  Fully participated with the group by sharing personal reflections / insights and openly received / provided feedback with other  participants.    Expressed understanding of the relationship between behaviors, thoughts, and feelings    Treatment Plan:  Patient has a current master individualized treatment plan.  See Epic treatment plan for more information.    Sulma Arshad, [facility]

## 2020-07-21 NOTE — GROUP NOTE
Psychoeducation Group Note    PATIENT'S NAME: Suzy Rodríguez  MRN:   [id number]  :   1985  ACCT. NUMBER: [id number]  DATE OF SERVICE: 20  START TIME: 10:00 AM  END TIME: 10:50 AM  FACILITATOR: Ap Montanez OTR/L  TOPIC: MH Life Skills Group: Resiliency Development  Telemedicine Visit: The patient's condition can be safely assessed and treated via synchronous audio and visual telemedicine encounter.      Reason for Telemedicine Visit: COVID-19    Originating Site (Patient Location): Patient's home    Distant Site (Provider Location): [facility]: [facility]    Consent:  The patient/guardian has verbally consented to: the potential risks and benefits of telemedicine (video visit) versus in person care; bill my insurance or make self-payment for services provided; and responsibility for payment of non-covered services.     Mode of Communication:  Video Conference via [employer]    As the provider I attest to compliance with applicable laws and regulations related to telemedicine.   Adult Mental Health Day Treatment  TRACK: 5A    NUMBER OF PARTICIPANTS: 4    Summary of Group / Topics Discussed:  Resiliency Development:  -Influence and Stress: Patients were taught how to identify stressors, signs of stress, coping skills, and prevention strategies for overall stress management.  Patients were given the opportunity to identify both ongoing and acute mental health symptoms and how to effectively manage these symptoms by developing an effective aftercare plan.  Patients increased awareness of community based resources.    Patient Session Goals / Objectives:    Identified how using coping skills can be used for symptom and stress management       Improved awareness of individualed symptoms and stressors and how to effectively cope     Established a relapse prevention plan to practice these skills in their own environments    Practiced and reflected on how to  generalize taught skills to their everyday life          Patient Participation / Response:  Fully participated with the group by sharing personal reflections / insights and openly received / provided feedback with other participants.    Patient Presentation: Calm,alert,focused with stable mood and thought process.    Treatment Plan:  Patient has a current master individualized treatment plan.  See Epic treatment plan for more information.    Ap Montanez, OTR/L

## 2020-07-21 NOTE — GROUP NOTE
Process Group Note    PATIENT'S NAME: Suzy Rodríguez  MRN:   [id number]  :   1985  ACCT. NUMBER: [id number]  DATE OF SERVICE: 20  START TIME:  9:00 AM  END TIME:  9:50 AM  FACILITATOR: Sulma Arshad LICSW  TOPIC:  Process Group    Diagnoses:  296.32 (F33.1) Major Depressive Disorder, Recurrent Episode, Moderate _ and With anxious distress  300.02 (F41.1) Generalized Anxiety Disorder      Adult Mental Health Day Treatment  TRACK: 5A    NUMBER OF PARTICIPANTS: 4    Telemedicine Visit: The patient's condition can be safely assessed and treated via synchronous audio and visual telemedicine encounter.      Reason for Telemedicine Visit: Services only offered telehealth    Originating Site (Patient Location): Patient's home    Distant Site (Provider Location): Provider Remote Setting    Consent:  The patient/guardian has verbally consented to: the potential risks and benefits of telemedicine (video visit) versus in person care; bill my insurance or make self-payment for services provided; and responsibility for payment of non-covered services.     Mode of Communication:  Video Conference via [employer]    As the provider I attest to compliance with applicable laws and regulations related to telemedicine.           Data:    Session content: At the start of this group, patients were invited to check in by identifying themselves, describing their current emotional status, and identifying issues to address in this group.   Area(s) of treatment focus addressed in this session included Symptom Management, Personal Safety and Community Resources/Discharge Planning.  Suzy reported conflict with her Mom about expectations for family cleaning and dishes at a recent gathering at Mom's cabin. Suzy reported that her Mom criticized her  for leaving a soda can on the table and critisized about dish washing.  Client worked on identifying possible ways to discuss concerns with her Mom.  She  stated that she has a social invitation for shopping with her Mom but is hesitant to attend given the recent communication about the visit.  Peers offered support and feedback.      Therapeutic Interventions/Treatment Strategies:  Psychotherapist offered support, feedback and validation and reinforced use of skills. Treatment modalities used include Cognitive Behavioral Therapy. Interventions include Relationship Skills: Assisted patients in implementing more effective communication skills in their relationships and Discussed relationships and ways to reduce conflict .    Assessment:    Patient response:   Patient responded to session by listening, focusing on goals and being attentive    Possible barriers to participation / learning include: and no barriers identified    Health Issues:   Yes: cancer in remission, No Psychological Distress       Substance Use Review:   Substance Use: No active concerns identified.    Mental Status/Behavioral Observations  Appearance:   Appropriate   Eye Contact:   Good   Psychomotor Behavior: Normal   Attitude:   Cooperative   Orientation:   All  Speech   Rate / Production: Normal    Volume:  Normal   Mood:    Anxious  Depressed   Affect:    Appropriate   Thought Content:   Clear  Thought Form:  Coherent  Logical     Insight:    Good     Plan:     Safety Plan: No current safety concerns identified.  Recommended that patient call 911 or go to the local ED should there be a change in any of these risk factors.     Barriers to treatment: None identified    Patient Contracts (see media tab):  None    Substance Use: Not addressed in session     Continue or Discharge: Patient will continue in Adult Day Treatment (ADT)  as planned. Patient is likely to benefit from learning and using skills as they work toward the goals identified in their treatment plan.      Sulma Arshad, [facility]  July 21, 2020

## 2020-07-21 NOTE — PROGRESS NOTES
Patient Active Problem List   Diagnosis     Adrenal mass, right (H)     Chest pain     Postoperative anemia due to acute blood loss     Pleural effusion on right     Ascites     Malignant neoplasm of cortex of right adrenal gland (H)     Anemia     Adrenal mass (H)     Luetscher's syndrome     Nausea with vomiting     Primary adrenal insufficiency (H)     Calculus of kidney     Carcinoma, adrenal cortical (H)     Cushing's syndrome (H)     Generalized anxiety disorder     Hypertension due to endocrine disorder     Hypervitaminosis A     MTHFR mutation (H)     Neoplastic malignant related fatigue     Other malignant neuroendocrine tumors (H)     S/P gastric bypass     Allergic urticaria     Depression     Herpes simplex infection of genitourinary system     Adnexal cyst     Major depressive disorder, recurrent episode, moderate with anxious distress (H)       Current Outpatient Medications:      acetaminophen (TYLENOL) 325 MG tablet, Take 2 tablets (650 mg) by mouth every 4 hours as needed for other (multimodal surgical pain management along with NSAIDS and opioid medication as indicated based on pain control and physical function.), Disp: 150 tablet, Rfl: 0     buPROPion (WELLBUTRIN) 100 MG tablet, TAKE 1 TABLET (100 MG TOTAL) BY MOUTH 2 (TWO) TIMES A DAY., Disp: , Rfl: 3     calcium carbonate (OS-SHASHA) 500 MG tablet, Take 1 tablet (500 mg) by mouth 2 times daily, Disp: 180 tablet, Rfl: 3     cholecalciferol (VITAMIN D-1000 MAX ST) 1000 units TABS, Take 2,000 Units by mouth every morning , Disp: , Rfl:      diazepam (VALIUM) 5 MG tablet, Take 1 tablet (5 mg) by mouth once as needed for anxiety (MRI claustrophobia management), Disp: 2 tablet, Rfl: 3     EPINEPHrine (EPIPEN/ADRENACLICK/OR ANY BX GENERIC EQUIV) 0.3 MG/0.3ML injection 2-pack, As directed for bee stings, Disp: , Rfl:      ferrous fumarate 65 mg, [place]. FE,-Vitamin C 125 mg (VITRON-C)  MG TABS tablet, Take 1 tablet by mouth 3 times daily, Disp: 270  tablet, Rfl: 1     fludrocortisone (FLORINEF) 0.1 MG tablet, Take 1 tablet (0.1 mg) by mouth daily, Disp: 90 tablet, Rfl: 3     FLUoxetine (PROZAC) 40 MG capsule, Take 60 mg by mouth daily , Disp: , Rfl:      gabapentin (NEURONTIN) 300 MG capsule, Take 300 mg by mouth 3 times daily, Disp: , Rfl:      hydrocortisone (CORTEF) 10 MG tablet, Take 3 tablets (30 MG) every morning and take 2 tablets (20 MG) by mouth every day at 2 PM. Additional as directed., Disp: 500 tablet, Rfl: 3     levothyroxine (SYNTHROID/LEVOTHROID) 125 MCG tablet, Take 1 tablet (125 mcg) by mouth daily, Disp: 90 tablet, Rfl: 3     mitotane (LYSODREN) 500 MG tablet, 1500mg in morning plus 1000 in evening for an total daily dose of 2500 mg., Disp: 150 tablet, Rfl: 2     Multiple Vitamins-Calcium (ONE-A-DAY WOMENS PO), Take 2 tablets by mouth every morning , Disp: , Rfl:      ondansetron (ZOFRAN) 8 MG tablet, Take 1 tablet (8 mg) by mouth every 8 hours as needed for nausea, Disp: 30 tablet, Rfl: 0     prochlorperazine (COMPAZINE) 5 MG tablet, Take 1 tablet (5 mg) by mouth every 8 hours as needed for nausea or vomiting, Disp: 90 tablet, Rfl: 3     traZODone (DESYREL) 50 MG tablet, Take 2 tablets (100 mg) by mouth nightly as needed for sleep, Disp: 60 tablet, Rfl: 0     UNABLE TO FIND, medical cannabis (Patient's own supply. Not a prescription), Disp: , Rfl:      vitamin B1 (THIAMINE) 50 MG tablet, Take 1 tablet (50 mg) by mouth daily, Disp: 90 tablet, Rfl: 1  Psychiatry staffing: case discussed  Diagnosis:  As above, note MTHFR variation.

## 2020-07-23 ENCOUNTER — HOSPITAL ENCOUNTER (OUTPATIENT)
Dept: BEHAVIORAL HEALTH | Facility: CLINIC | Age: 35
End: 2020-07-23
Attending: PSYCHIATRY & NEUROLOGY
Payer: COMMERCIAL

## 2020-07-23 PROCEDURE — 90853 GROUP PSYCHOTHERAPY: CPT | Mod: GT | Performed by: SOCIAL WORKER

## 2020-07-23 PROCEDURE — 90853 GROUP PSYCHOTHERAPY: CPT | Mod: 95 | Performed by: SOCIAL WORKER

## 2020-07-23 NOTE — GROUP NOTE
Psychotherapy Group Note    PATIENT'S NAME: Suzy Rodríguez  MRN:   [id number]  :   1985  ACCT. NUMBER: [id number]  DATE OF SERVICE: 20  START TIME: 10:00 AM  END TIME: 10:50 AM  FACILITATOR: Sulma Arshad LICSW  TOPIC:  EBP Group: Cognitive Restructuring  Adult Mental Health Day Treatment  TRACK: 5A    NUMBER OF PARTICIPANTS: 6    Summary of Group / Topics Discussed:  Cognitive Restructuring: Perfectionism: Patients discussed and reflected on what perfectionism is, how it develops, and how it impacts functioning. Ways to challenge perfectionism were explored and discussed by the group, with the goal of challenging perfectionistic thinking and improving functioning.    Patient Session Goals / Objectives:    Understand the concept of perfectionistic thoughts and how they develop.     Increase recognition of the connection between perfectionistic thinking and symptoms.    Explore and practice new ways to challenge the perfectionistic stance and replace with reasonable expectations of self and others.    Begin to formulate realistic personal expectations and goals.    Telemedicine Visit: The patient's condition can be safely assessed and treated via synchronous audio and visual telemedicine encounter.      Reason for Telemedicine Visit: Services only offered telehealth    Originating Site (Patient Location): Patient's home    Distant Site (Provider Location): Provider Remote Setting    Consent:  The patient/guardian has verbally consented to: the potential risks and benefits of telemedicine (video visit) versus in person care; bill my insurance or make self-payment for services provided; and responsibility for payment of non-covered services.     Mode of Communication:  Video Conference via [employer]    As the provider I attest to compliance with applicable laws and regulations related to telemedicine.                Patient Participation / Response:  Fully participated with the group by  sharing personal reflections / insights and openly received / provided feedback with other participants.    Demonstrated knowledge of personal thought patterns and how they impact their mood and behavior.    Treatment Plan:  Patient has a current master individualized treatment plan.  See Epic treatment plan for more information.    Sulma Arshad, [facility]

## 2020-07-23 NOTE — GROUP NOTE
Process Group Note    PATIENT'S NAME: Suzy Rodríguez  MRN:   [id number]  :   1985  ACCT. NUMBER: [id number]  DATE OF SERVICE: 20  START TIME:  9:00 AM  END TIME:  9:50 AM  FACILITATOR: Sulma Arshad LICSW  TOPIC:  Process Group    Diagnoses:  296.32 (F33.1) Major Depressive Disorder, Recurrent Episode, Moderate _ and With anxious distress  300.02 (F41.1) Generalized Anxiety Disorder      Adult Mental Health Day Treatment  TRACK: 5A    NUMBER OF PARTICIPANTS: 5 plus one member with late arrival     Telemedicine Visit: The patient's condition can be safely assessed and treated via synchronous audio and visual telemedicine encounter.      Reason for Telemedicine Visit: Services only offered telehealth    Originating Site (Patient Location): Patient's home    Distant Site (Provider Location): Provider Remote Setting    Consent:  The patient/guardian has verbally consented to: the potential risks and benefits of telemedicine (video visit) versus in person care; bill my insurance or make self-payment for services provided; and responsibility for payment of non-covered services.     Mode of Communication:  Video Conference via [employer]    As the provider I attest to compliance with applicable laws and regulations related to telemedicine.           Data:    Session content: At the start of this group, patients were invited to check in by identifying themselves, describing their current emotional status, and identifying issues to address in this group.   Area(s) of treatment focus addressed in this session included Symptom Management, Personal Safety and Community Resources/Discharge Planning.  Suzy reported ongoing anxiety over son's mental health.  She stated that she was pleased that he accepted two invitations from friends.  She reported continuing self-care practice.  Client denied suicidal ideation, intent and plan.     Therapeutic Interventions/Treatment Strategies:  Psychotherapist  offered support, feedback and validation and reinforced use of skills. Treatment modalities used include Cognitive Behavioral Therapy. Interventions include Coping Skills: Discussed use of self-soothe skills to decrease distress in the body.    Assessment:    Patient response:   Patient responded to session by focusing on goals and being attentive    Possible barriers to participation / learning include: and no barriers identified    Health Issues:   None reported       Substance Use Review:   Substance Use: No active concerns identified.    Mental Status/Behavioral Observations  Appearance:   Appropriate   Eye Contact:   Good   Psychomotor Behavior: Normal   Attitude:   Cooperative   Orientation:   All  Speech   Rate / Production: Normal    Volume:  Normal   Mood:    Anxious  Depressed   Affect:    Appropriate   Thought Content:   Clear  Thought Form:  Coherent  Logical     Insight:    Good     Plan:     Safety Plan: No current safety concerns identified.  Recommended that patient call 911 or go to the local ED should there be a change in any of these risk factors.     Barriers to treatment: None identified    Patient Contracts (see media tab):  None    Substance Use: Not addressed in session     Continue or Discharge: Patient will continue in Adult Day Treatment (ADT)  as planned. Patient is likely to benefit from learning and using skills as they work toward the goals identified in their treatment plan.      Sulma Arshad, [facility]  July 23, 2020

## 2020-07-24 LAB — LAB SCANNED RESULT: NORMAL

## 2020-07-25 LAB
COLLECT DURATION TIME SPEC: 24 H
CORTIS F 24H UR HPLC-MCNC: 49.7 UG/L
CORTIS F 24H UR-MRATE: 100.6 UG/D
CORTIS F/CREAT 24H UR: 71 UG/G CRT
CREAT 24H UR-MRATE: 1418 MG/D (ref 700–1600)
CREAT UR-MCNC: 70 MG/DL
IMP & REVIEW OF LAB RESULTS: ABNORMAL
SPECIMEN VOL ?TM UR: 2025 ML

## 2020-07-27 ENCOUNTER — HOSPITAL ENCOUNTER (OUTPATIENT)
Dept: BEHAVIORAL HEALTH | Facility: CLINIC | Age: 35
End: 2020-07-27
Attending: PSYCHIATRY & NEUROLOGY
Payer: COMMERCIAL

## 2020-07-27 PROCEDURE — G0177 OPPS/PHP; TRAIN & EDUC SERV: HCPCS | Mod: 95

## 2020-07-27 NOTE — GROUP NOTE
Psychoeducation Group Note    PATIENT'S NAME: Suzy Rodríguez  MRN:   [id number]  :   1985  [id number]  DATE OF SERVICE: 20  START TIME:  9:00 AM  END TIME:  9:50 AM  FACILITATOR: Genny Kelley RN  TOPIC: NELSON RN Group: Medication Education and Management  Telemedicine Visit: The patient's condition can be safely assessed and treated via synchronous audio and visual telemedicine encounter.      Reason for Telemedicine Visit:  covid19    Originating Site (Patient Location): Patient's home    Distant Site (Provider Location): Provider Remote Setting    Consent:  The patient/guardian has verbally consented to: the potential risks and benefits of telemedicine (video visit) versus in person care; bill my insurance or make self-payment for services provided; and responsibility for payment of non-covered services.     Mode of Communication:  Video Conference via [employer]    As the provider I attest to compliance with applicable laws and regulations related to telemedicine.      Adult Mental Health Day Treatment  TRACK: 5A    NUMBER OF PARTICIPANTS: 5    Summary of Group / Topics Discussed:  Medication Educations and Management:  Medication Assessment/Review: Patients were given a medication quiz to assess their current knowledge about the medications that are prescribed and why they are taking them. Medication lists were reviewed with each patient individually to provide education and answer questions. Safe medication storage, handling, management, and disposal were discussed within the group. Patients completed medication wallet cards    Patient Session Goals / Objectives:  ? Assessed patient understanding of their current medications and indication  ? Identify what to do if a dose is missed  ? Assessed medication adherence  ? Assessed knowledge of medication side effects and how to treat them      Patient Participation / Response:  Moderately participated, sharing some personal  reflections / insights and adequately adequately received / provided feedback with other participants.     Verbalized understanding of medication education and management topic    Treatment Plan:  Patient has a current master individualized treatment plan.  See Epic treatment plan for more information.    Genny Kelley RN

## 2020-07-29 ENCOUNTER — COMMUNICATION - HEALTHEAST (OUTPATIENT)
Dept: FAMILY MEDICINE | Facility: CLINIC | Age: 35
End: 2020-07-29

## 2020-07-29 DIAGNOSIS — F33.9 MAJOR DEPRESSIVE DISORDER, RECURRENT EPISODE (H): ICD-10-CM

## 2020-07-29 DIAGNOSIS — F41.1 GENERALIZED ANXIETY DISORDER: ICD-10-CM

## 2020-07-30 ENCOUNTER — PATIENT OUTREACH (OUTPATIENT)
Dept: ONCOLOGY | Facility: CLINIC | Age: 35
End: 2020-07-30

## 2020-07-30 ENCOUNTER — HOSPITAL ENCOUNTER (OUTPATIENT)
Dept: BEHAVIORAL HEALTH | Facility: CLINIC | Age: 35
End: 2020-07-30
Attending: PSYCHIATRY & NEUROLOGY
Payer: COMMERCIAL

## 2020-07-30 PROCEDURE — 90853 GROUP PSYCHOTHERAPY: CPT | Mod: GT | Performed by: SOCIAL WORKER

## 2020-07-30 NOTE — PROGRESS NOTES
Spoke to Suzy, let her know that I am covering for Yvonne Begum today and I know there is a message out to Dr. Cintron to reach out to her about her [place] level which I am happy to give her but unable to interpret the results or follow up plan.  Patient acknowledge this and requested the information to be given, so I told her the level came back at 8.7.  Not the news she wanted so I explained I will send another message to Dr. Cintron to get in touch with her about these results.

## 2020-07-30 NOTE — GROUP NOTE
Psychotherapy Group Note    PATIENT'S NAME: Suzy Rodríguez  MRN:   [id number]  :   1985  ACCT. NUMBER: [id number]  DATE OF SERVICE: 20  START TIME: 10:00 AM  END TIME: 10:50 AM  FACILITATOR: Sulma Arshad LICSW  TOPIC:  EBP Group: Symptom Awareness  Adult Mental Health Day Treatment  TRACK: 5A    NUMBER OF PARTICIPANTS: 6    Summary of Group / Topics Discussed:  Symptom Awareness: Mood Disorders: Patients received a general overview of mood disorders including depressive disorders, anxiety disorders, and bipolar disorders and how it relates to their current symptoms. The purpose is to promote understanding of their diagnoses and how it impacts their functioning. Patients reviewed their current awareness of symptoms and diagnoses. Patients received information regarding diagnoses, etiology, cultural, and environmental factors as well as impact on functioning.     Patient Session Goals / Objectives:    Discussed patient individual symptoms and experiences    Reviewed diagnostic criteria and etiology of diagnoses       Telemedicine Visit: The patient's condition can be safely assessed and treated via synchronous audio and visual telemedicine encounter.      Reason for Telemedicine Visit: Services only offered telehealth    Originating Site (Patient Location): Patient's home    Distant Site (Provider Location): Provider Remote Setting    Consent:  The patient/guardian has verbally consented to: the potential risks and benefits of telemedicine (video visit) versus in person care; bill my insurance or make self-payment for services provided; and responsibility for payment of non-covered services.     Mode of Communication:  Video Conference via [employer]    As the provider I attest to compliance with applicable laws and regulations related to telemedicine.         Patient Participation / Response:  Fully participated with the group by sharing personal reflections / insights and openly  received / provided feedback with other participants.    Verbalized understanding of how awareness can benefit mental health symptoms     Treatment Plan:  Patient has a current master individualized treatment plan.  See Epic treatment plan for more information.    TIM MgSW

## 2020-07-30 NOTE — GROUP NOTE
Process Group Note    PATIENT'S NAME: Suzy Rodríguez  MRN:   [id number]  :   1985  ACCT. NUMBER: [id number]  DATE OF SERVICE: 20  START TIME:  9:00 AM  END TIME:  9:50 AM  FACILITATOR: Sulma Arshad LICSW  TOPIC:  Process Group    Diagnoses:  296.32 (F33.1) Major Depressive Disorder, Recurrent Episode, Moderate _ and With anxious distress  300.02 (F41.1) Generalized Anxiety Disorder      Adult Mental Health Day Treatment  TRACK: 5A    NUMBER OF PARTICIPANTS: 5    Telemedicine Visit: The patient's condition can be safely assessed and treated via synchronous audio and visual telemedicine encounter.      Reason for Telemedicine Visit: Services only offered telehealth    Originating Site (Patient Location): Patient's home    Distant Site (Provider Location): Provider Remote Setting    Consent:  The patient/guardian has verbally consented to: the potential risks and benefits of telemedicine (video visit) versus in person care; bill my insurance or make self-payment for services provided; and responsibility for payment of non-covered services.     Mode of Communication:  Video Conference via [employer]    As the provider I attest to compliance with applicable laws and regulations related to telemedicine.           Data:    Session content: At the start of this group, patients were invited to check in by identifying themselves, describing their current emotional status, and identifying issues to address in this group.   Area(s) of treatment focus addressed in this session included Symptom Management, Personal Safety and Community Resources/Discharge Planning.  Suzy reported depressed mood as more intense right now.  She reported difficulty doig self-cares, household cares, and parenting tasks.  She stated that symptoms worsened after she has been having pain in her back which she worries is related to the cancer.  She also had a neighbor die at age 40 which brought up issues regarding her  cancer and the possibility of early death.  Client denied suicidal ideation, intent and plan.     Therapeutic Interventions/Treatment Strategies:  Psychotherapist offered support, feedback and validation and reinforced use of skills. Treatment modalities used include Cognitive Behavioral Therapy. Interventions include Coping Skills: Discussed use of self-soothe skills to decrease distress in the body.    Assessment:    Patient response:   Patient responded to session by giving feedback and listening    Possible barriers to participation / learning include: and no barriers identified    Health Issues:   None reported       Substance Use Review:   Substance Use: No active concerns identified.    Mental Status/Behavioral Observations  Appearance:   Appropriate   Eye Contact:   Good   Psychomotor Behavior: Normal   Attitude:   Cooperative   Orientation:   All  Speech   Rate / Production: Normal/ Responsive Normal    Volume:  Normal   Mood:    Depressed   Affect:    Appropriate   Thought Content:   Rumination  Thought Form:  Coherent  Logical     Insight:    Good     Plan:     Safety Plan: No current safety concerns identified.  Recommended that patient call 911 or go to the local ED should there be a change in any of these risk factors.     Barriers to treatment: None identified    Patient Contracts (see media tab):  None    Substance Use: Not addressed in session     Continue or Discharge: Patient will continue in Adult Day Treatment (ADT)  as planned. Patient is likely to benefit from learning and using skills as they work toward the goals identified in their treatment plan.      Sulma Arshad, [facility]  July 30, 2020

## 2020-08-04 ENCOUNTER — HOSPITAL ENCOUNTER (OUTPATIENT)
Dept: BEHAVIORAL HEALTH | Facility: CLINIC | Age: 35
End: 2020-08-04
Attending: PSYCHIATRY & NEUROLOGY
Payer: COMMERCIAL

## 2020-08-04 PROCEDURE — 90853 GROUP PSYCHOTHERAPY: CPT | Mod: 95 | Performed by: SOCIAL WORKER

## 2020-08-04 PROCEDURE — 90853 GROUP PSYCHOTHERAPY: CPT | Mod: GT | Performed by: SOCIAL WORKER

## 2020-08-04 PROCEDURE — G0177 OPPS/PHP; TRAIN & EDUC SERV: HCPCS | Mod: 95

## 2020-08-04 NOTE — GROUP NOTE
Psychotherapy Group Note    PATIENT'S NAME: Suzy Rodríguez  MRN:   [id number]  :   1985  ACCT. NUMBER: [id number]  DATE OF SERVICE: 20  START TIME: 11:00 AM  END TIME: 11:50 AM  FACILITATOR: Sulma Arshad LICSW  TOPIC: MH EBP Group: Coping Skills  Adult Mental Health Day Treatment  TRACK: 5A    NUMBER OF PARTICIPANTS: 5    Summary of Group / Topics Discussed:  Coping Skills: Self-Soothe: Patients learned to apply self-soothe as a way to decrease heightened stress in the moment.  Patients identified situations that necessitate self-soothe strategies.  They focused on ways to manage physical symptoms of distress using the senses. They discussed how to distinguish when this can be useful in their lives when other strategies are more relevant or helpful.  The group continued this topic from previous day.      Patient Session Goals / Objectives:    Understand the purpose of using the senses to decrease distress    Process what happens in the body when using self-soothe strategies    Demonstrate understanding of when to use self-soothe strategies    Identify and problem solve barriers to applying self-soothe strategies.    Choose 1-2 self-soothe strategies to apply during times of distress.    Telemedicine Visit: The patient's condition can be safely assessed and treated via synchronous audio and visual telemedicine encounter.      Reason for Telemedicine Visit: Services only offered telehealth    Originating Site (Patient Location): Patient's home    Distant Site (Provider Location): Provider Remote Setting    Consent:  The patient/guardian has verbally consented to: the potential risks and benefits of telemedicine (video visit) versus in person care; bill my insurance or make self-payment for services provided; and responsibility for payment of non-covered services.     Mode of Communication:  Video Conference via [employer]    As the provider I attest to compliance with applicable laws and  regulations related to telemedicine.         Patient Participation / Response:  Fully participated with the group by sharing personal reflections / insights and openly received / provided feedback with other participants.    Identified barriers to applying coping skills and Identified / Expressed personal readiness to practice new coping skills    Treatment Plan:  Patient has a current master individualized treatment plan.  See Epic treatment plan for more information.    Sulma Arshad, [facility]

## 2020-08-04 NOTE — GROUP NOTE
Psychoeducation Group Note    PATIENT'S NAME: Suzy Rodríguez  MRN:   [id number]  :   1985  ACCT. NUMBER: [id number]  DATE OF SERVICE: 20  START TIME: 10:00 AM  END TIME: 10:50 AM  FACILITATOR: Ap Montanez OTR/L  TOPIC: MH Life Skills Group: Resiliency Development  Telemedicine Visit: The patient's condition can be safely assessed and treated via synchronous audio and visual telemedicine encounter.      Reason for Telemedicine Visit: COVID-19    Originating Site (Patient Location): Patient's home    Distant Site (Provider Location): [facility]    Consent:  The patient/guardian has verbally consented to: the potential risks and benefits of telemedicine (video visit) versus in person care; bill my insurance or make self-payment for services provided; and responsibility for payment of non-covered services.     Mode of Communication:  Video Conference via [employer]    As the provider I attest to compliance with applicable laws and regulations related to telemedicine.   Adult Mental Health Day Treatment  TRACK: 5A    NUMBER OF PARTICIPANTS: 5    Summary of Group / Topics Discussed:  Resiliency Development:  Exercises for Stress Reduction: Patients were taught how to identify stressors, signs of stress, coping skills, and prevention strategies for overall stress management.  Patients were given the opportunity to identify both ongoing and acute mental health symptoms and how to effectively manage these symptoms by developing an effective aftercare plan.  Patients increased awareness of community based resources.    Patient Session Goals / Objectives:    Identified how using coping skills can be used for symptom and stress management       Improved awareness of individualed symptoms and stressors and how to effectively cope     Established a relapse prevention plan to practice these skills in their own environments    Practiced and reflected on how to generalize  taught skills to their everyday life          Patient Participation / Response:  Fully participated with the group by sharing personal reflections / insights and openly received / provided feedback with other participants.    Patient Presentation: Calm,alert,focused with stable mood and thought process.    Treatment Plan:  Patient has a current master individualized treatment plan.  See Epic treatment plan for more information.    Ap Montanez, OTR/L

## 2020-08-04 NOTE — GROUP NOTE
Process Group Note    PATIENT'S NAME: Suzy Rodríguez  MRN:   [id number]  :   1985  [id number]  DATE OF SERVICE: 20  START TIME:  9:00 AM  END TIME:  9:50 AM  FACILITATOR: Sulma Arshad LICSW  TOPIC:  Process Group    Diagnoses:  296.32 (F33.1) Major Depressive Disorder, Recurrent Episode, Moderate _ and With anxious distress  300.02 (F41.1) Generalized Anxiety Disorder      Adult Mental Health Day Treatment  TRACK: 5A    NUMBER OF PARTICIPANTS: 4    Telemedicine Visit: The patient's condition can be safely assessed and treated via synchronous audio and visual telemedicine encounter.      Reason for Telemedicine Visit: Services only offered telehealth    Originating Site (Patient Location): Patient's home    Distant Site (Provider Location): Provider Remote Setting    Consent:  The patient/guardian has verbally consented to: the potential risks and benefits of telemedicine (video visit) versus in person care; bill my insurance or make self-payment for services provided; and responsibility for payment of non-covered services.     Mode of Communication:  Video Conference via [employer]    As the provider I attest to compliance with applicable laws and regulations related to telemedicine.           Data:    Session content: At the start of this group, patients were invited to check in by identifying themselves, describing their current emotional status, and identifying issues to address in this group.   Area(s) of treatment focus addressed in this session included Symptom Management, Personal Safety and Community Resources/Discharge Planning.  Suzy reported feeling absent yesterday due to not sleeping on  night.  She stated that she did not sleep as her  is a  and he was working at a fire which made her anxious for his safety  She stated that she had trouble sleeping last night as her dog kept moving on the bed.  She stated that she is  looking forward to seeing her oncologist in person on Wednesday.  She stated that she has questions prepared for the visit.  She stated that she had negative self-talk yesterday so she called a friend to join her at a restaurant for dinner.  Client denied suicidal ideation, intent and plan. She reported poor concentration and poor focus for reading.      Therapeutic Interventions/Treatment Strategies:  Psychotherapist offered support, feedback and validation and reinforced use of skills. Treatment modalities used include Cognitive Behavioral Therapy. Interventions include Coping Skills: Reviewed patients current calming practices and discussed a more formal way of practicing and accessing skills and reviewed reaching out for support when manging negative self-talk..    Assessment:    Patient response:   Patient responded to session by listening, focusing on goals and being attentive    Possible barriers to participation / learning include: and no barriers identified    Health Issues:   Yes: cancer, Associated Psychological Distress       Substance Use Review:   Substance Use: No active concerns identified.    Mental Status/Behavioral Observations  Appearance:   Appropriate   Eye Contact:   Good   Psychomotor Behavior: Normal   Attitude:   Cooperative  Friendly  Orientation:   All  Speech   Rate / Production: Normal    Volume:  Normal   Mood:    Depressed   Affect:    Appropriate   Thought Content:   Clear  Thought Form:  Coherent  Logical     Insight:    Good     Plan:     Safety Plan: No current safety concerns identified.  Recommended that patient call 911 or go to the local ED should there be a change in any of these risk factors.     Barriers to treatment: None identified    Patient Contracts (see media tab):  None    Substance Use: Not addressed in session     Continue or Discharge: Patient will continue in Adult Day Treatment (ADT)  as planned. Patient is likely to benefit from learning and using skills as  they work toward the goals identified in their treatment plan.      Sulma Arshad, [facility]  August 4, 2020

## 2020-08-05 ENCOUNTER — ONCOLOGY VISIT (OUTPATIENT)
Dept: ONCOLOGY | Facility: CLINIC | Age: 35
End: 2020-08-05
Attending: INTERNAL MEDICINE
Payer: COMMERCIAL

## 2020-08-05 ENCOUNTER — APPOINTMENT (OUTPATIENT)
Dept: LAB | Facility: CLINIC | Age: 35
End: 2020-08-05
Attending: INTERNAL MEDICINE
Payer: COMMERCIAL

## 2020-08-05 ENCOUNTER — RECORDS - HEALTHEAST (OUTPATIENT)
Dept: ADMINISTRATIVE | Facility: OTHER | Age: 35
End: 2020-08-05

## 2020-08-05 VITALS
WEIGHT: 152.8 LBS | OXYGEN SATURATION: 97 % | SYSTOLIC BLOOD PRESSURE: 112 MMHG | BODY MASS INDEX: 26.21 KG/M2 | HEART RATE: 94 BPM | RESPIRATION RATE: 16 BRPM | TEMPERATURE: 99 F | DIASTOLIC BLOOD PRESSURE: 76 MMHG

## 2020-08-05 DIAGNOSIS — R07.89 ATYPICAL CHEST PAIN: Primary | ICD-10-CM

## 2020-08-05 DIAGNOSIS — C74.01 MALIGNANT NEOPLASM OF CORTEX OF RIGHT ADRENAL GLAND (H): ICD-10-CM

## 2020-08-05 LAB
ALBUMIN SERPL-MCNC: 3 G/DL (ref 3.4–5)
ALP SERPL-CCNC: 82 U/L (ref 40–150)
ALT SERPL W P-5'-P-CCNC: 21 U/L (ref 0–50)
ANION GAP SERPL CALCULATED.3IONS-SCNC: 8 MMOL/L (ref 3–14)
AST SERPL W P-5'-P-CCNC: 16 U/L (ref 0–45)
BASOPHILS # BLD AUTO: 0 10E9/L (ref 0–0.2)
BASOPHILS NFR BLD AUTO: 0.7 %
BILIRUB SERPL-MCNC: 0.3 MG/DL (ref 0.2–1.3)
BUN SERPL-MCNC: 10 MG/DL (ref 7–30)
CALCIUM SERPL-MCNC: 8.1 MG/DL (ref 8.5–10.1)
CHLORIDE SERPL-SCNC: 108 MMOL/L (ref 94–109)
CHOLEST SERPL-MCNC: 188 MG/DL
CO2 SERPL-SCNC: 24 MMOL/L (ref 20–32)
CREAT SERPL-MCNC: 0.88 MG/DL (ref 0.52–1.04)
DIFFERENTIAL METHOD BLD: ABNORMAL
EOSINOPHIL # BLD AUTO: 0.1 10E9/L (ref 0–0.7)
EOSINOPHIL NFR BLD AUTO: 1.4 %
ERYTHROCYTE [DISTWIDTH] IN BLOOD BY AUTOMATED COUNT: 14.3 % (ref 10–15)
GFR SERPL CREATININE-BSD FRML MDRD: 85 ML/MIN/{1.73_M2}
GLUCOSE SERPL-MCNC: 102 MG/DL (ref 70–99)
HCT VFR BLD AUTO: 44.6 % (ref 35–47)
HDLC SERPL-MCNC: 114 MG/DL
HGB BLD-MCNC: 14.5 G/DL (ref 11.7–15.7)
IMM GRANULOCYTES # BLD: 0 10E9/L (ref 0–0.4)
IMM GRANULOCYTES NFR BLD: 0.2 %
LDLC SERPL CALC-MCNC: 51 MG/DL
LYMPHOCYTES # BLD AUTO: 1.5 10E9/L (ref 0.8–5.3)
LYMPHOCYTES NFR BLD AUTO: 33.6 %
MCH RBC QN AUTO: 31.5 PG (ref 26.5–33)
MCHC RBC AUTO-ENTMCNC: 32.5 G/DL (ref 31.5–36.5)
MCV RBC AUTO: 97 FL (ref 78–100)
MONOCYTES # BLD AUTO: 0.4 10E9/L (ref 0–1.3)
MONOCYTES NFR BLD AUTO: 9.5 %
NEUTROPHILS # BLD AUTO: 2.4 10E9/L (ref 1.6–8.3)
NEUTROPHILS NFR BLD AUTO: 54.6 %
NONHDLC SERPL-MCNC: 74 MG/DL
NRBC # BLD AUTO: 0 10*3/UL
NRBC BLD AUTO-RTO: 0 /100
PLATELET # BLD AUTO: 142 10E9/L (ref 150–450)
POTASSIUM SERPL-SCNC: 3.2 MMOL/L (ref 3.4–5.3)
PROT SERPL-MCNC: 6 G/DL (ref 6.8–8.8)
RBC # BLD AUTO: 4.61 10E12/L (ref 3.8–5.2)
SODIUM SERPL-SCNC: 141 MMOL/L (ref 133–144)
T4 FREE SERPL-MCNC: 0.96 NG/DL (ref 0.76–1.46)
TRIGL SERPL-MCNC: 118 MG/DL
TSH SERPL DL<=0.005 MIU/L-ACNC: 0.75 MU/L (ref 0.4–4)
WBC # BLD AUTO: 4.3 10E9/L (ref 4–11)

## 2020-08-05 PROCEDURE — 80061 LIPID PANEL: CPT | Performed by: INTERNAL MEDICINE

## 2020-08-05 PROCEDURE — 84439 ASSAY OF FREE THYROXINE: CPT | Performed by: INTERNAL MEDICINE

## 2020-08-05 PROCEDURE — 85025 COMPLETE CBC W/AUTO DIFF WBC: CPT | Performed by: INTERNAL MEDICINE

## 2020-08-05 PROCEDURE — G0463 HOSPITAL OUTPT CLINIC VISIT: HCPCS | Mod: ZF

## 2020-08-05 PROCEDURE — 80053 COMPREHEN METABOLIC PANEL: CPT | Performed by: INTERNAL MEDICINE

## 2020-08-05 PROCEDURE — 93010 ELECTROCARDIOGRAM REPORT: CPT | Mod: ZP | Performed by: INTERNAL MEDICINE

## 2020-08-05 PROCEDURE — 84443 ASSAY THYROID STIM HORMONE: CPT | Performed by: INTERNAL MEDICINE

## 2020-08-05 PROCEDURE — 82627 DEHYDROEPIANDROSTERONE: CPT | Performed by: INTERNAL MEDICINE

## 2020-08-05 PROCEDURE — 84999 UNLISTED CHEMISTRY PROCEDURE: CPT | Performed by: INTERNAL MEDICINE

## 2020-08-05 PROCEDURE — 80375 DRUG/SUBSTANCE NOS 1-3: CPT | Performed by: INTERNAL MEDICINE

## 2020-08-05 PROCEDURE — 99215 OFFICE O/P EST HI 40 MIN: CPT | Mod: ZP | Performed by: INTERNAL MEDICINE

## 2020-08-05 PROCEDURE — 36415 COLL VENOUS BLD VENIPUNCTURE: CPT

## 2020-08-05 ASSESSMENT — PAIN SCALES - GENERAL: PAINLEVEL: NO PAIN (0)

## 2020-08-05 NOTE — PROGRESS NOTES
"MEDICAL ONCOLOGY CLINIC NOTE    REFERRING PROVIDER: Warren Mansfield MD from [facility].      REASON FOR CURRENT VISIT: Follow-up for adrenocortical carcinoma, currently on adjuvant mitotane.    HISTORY OF PRESENT ILLNESS: Ms. 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     Suzy has been stable since the last visit. She had developed significant, severe fatigue, slurred speech, muscle pain, insomnia, and tremors since being on the higher dose of mitotane 1500mg AM - 1500mg afternoon and 1000mg HS. These hasimproved with dropping the dose to 1000mg BID and giving a brief treatment interruption. She's been on mitotane 1500mg QAM+1000 QPM on all days x 6-8 weeks. Also on hydrocortisone 30mg QAM, 20mg early PM. Feels jittery after hydrocortisone doses.     Has moderate but tolerable (per self-report) tremors. No worsening of mild, occasional slurred speech. Trazodone and clonazepam only partially helpful for insomnia. No more nausea with mitotane so she stopped taking compazine. Denies any fever, diarrhea, dizziness, wt loss. Has noted episodic CP and SOA that occurs randomly, improves with marijuana/time and has no exertional component.      Last visit (virtual) with Dr. Hilton at [facility] was on 3/24/20. She also follows with Dr. Veena Jaquez in our endocrinology department. No clinical evidence of disease recurrence.     ONCOLOGIC HISTORY:  1. 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   - CT abdomen and pelvis stone protocol without contrast 5 8018 showed \"9.2 x 8 cm heterogeneous right upper quadrant mass with small foci of calcification within it which is likely arising from the adrenal " "gland though inseparable from liver and upper pole of right kidney. It is incompletely evaluated on this noncontrast study. 3 x 2.6 cm mass right lobe of liver on image #85 also incompletely evaluated. 6 x 4 x 4 mm upper third left ureteral obstructing stone with mild to moderate secondary hydronephrosis. Collection of stones at lower pole left kidney extending over an area of approximately 6 x 7 x 4 mm. Additional nonobstructing 1 mm stone upper pole left kidney. 2 mm nonobstructing stone noted upper pole right kidney with no hydronephrosis on the right. Postop change consistent with gastric bypass. Noncontrast images of spleen, left adrenal gland, gallbladder, and pancreas unremarkable. No aneurysm. No adenopathy.\"  - Seen by Dr. Delvalle at [facility]. Referred to Dr. Alvino Patel and then Dr. Warren Mansfield.  - Endocrinology team directed workup showed high DHEAS level of 740 pre-surgery.   - Right open adrenalectomy and hepatic mobilization performed by Dr. Warren Mansfield on 6/25/2018. Pathology showed adrenocortical carcinoma measuring 11.3 cm, weighing 413 g with extension into or through the adrenal capsule negative lymphovascular invasion, tumor necrosis present, margins involved by tumor, no regional lymph nodes identified, pathologic stage T2 NX.  pathology review reveals low grade ACC.  - DHEAS normalized postsurgery.   - Adjuvant Mitotane started on 7/24/18. Dose increased to 2000mg TID around 10/23/18 due to persistently low troughs. Held 1/16/19 for two weeks and resumed 1/30 at 1000 mg po BID due to very poor tolerance of higher doses. Highest mitotane level was 10.2 on 2/11/19.  - Saw radiation oncology at [facility], radiation oncology at [facility], as well as endocrine oncology (Dr. Huertas) at [facility].   - S/p adjuvant RT by Dr. Monsivais - 5040 cGy over 30 fr (8/24/18-10/6/18).  - 2/11/19: OSH CT C/A/P and MRI brain with contrast - no " evidence of disease recurrence.   - 20: CT C/A/P with contrast - AFUA.    REVIEW OF SYSTEMS: 14 point ROS negative other than the symptoms noted above in the HPI.    PAST MEDICAL HISTORY:  Past Medical History:   Diagnosis Date     Adrenal cortex cancer, right (H) 2018    Resected 18, Dr. Mansfield.     Adrenal mass (H)      Chocolate cyst of ovary      History of miscarriage      Malignant neoplasm of cortex of right adrenal gland (H) 2018    EOTD; 19.  Check in May. SCP started.  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) 2018   Cushing's syndrome, secondary to #1 (300 mcg/24 hr); Rx hydrocortisone 20 + 10 post op   Post operative defect right hemidiaphragm with ?worsening right effussion, not present preop   Currently on mitotane, 500 mg, BID x 1 week + hydrocortisone     MTHFR mutation (H)    MHTFR mutation with h/o mutiple miscarriages.    PAST SURGICAL HISTORY:   Past Surgical History:   Procedure Laterality Date     ADRENALECTOMY Right 2018    Procedure: ADRENALECTOMY;  Right Adrenalectomy,  Embolize Liver , Anesthesia Block;  Surgeon: Warren Mansfield MD;  Location: [facility] OR     ADRENALECTOMY        SECTION      twice      SECTION      2     EMBOLECTOMY ABDOMEN N/A 2018    Procedure: EMBOLECTOMY ABDOMEN;;  Surgeon: Ector Mcintyre MD;  Location: [facility] OR     EXTRACORPOREAL SHOCK WAVE LITHOTRIPSY (ESWL)       GASTRIC BYPASS        SOCIAL HISTORY:   Social History     Tobacco Use     Smoking status: Never Smoker     Smokeless tobacco: Never Used   Substance Use Topics     Alcohol use: Yes     Comment: occ.     Drug use: No     FAMILY HISTORY:   Family History   Problem Relation Age of Onset     Depression Paternal Grandmother      ALLERGIES:   Allergies   Allergen Reactions     Bee Venom Swelling     Ibuprofen Hives and Swelling     CURRENT MEDICATIONS:   Current Outpatient  Medications:      acetaminophen (TYLENOL) 325 MG tablet, Take 2 tablets (650 mg) by mouth every 4 hours as needed for other (multimodal surgical pain management along with NSAIDS and opioid medication as indicated based on pain control and physical function.), Disp: 150 tablet, Rfl: 0     buPROPion (WELLBUTRIN) 100 MG tablet, TAKE 1 TABLET (100 MG TOTAL) BY MOUTH 2 (TWO) TIMES A DAY., Disp: , Rfl: 3     calcium carbonate (OS-SHASHA) 500 MG tablet, Take 1 tablet (500 mg) by mouth 2 times daily, Disp: 180 tablet, Rfl: 3     cholecalciferol (VITAMIN D-1000 MAX ST) 1000 units TABS, Take 2,000 Units by mouth every morning , Disp: , Rfl:      diazepam (VALIUM) 5 MG tablet, Take 1 tablet (5 mg) by mouth once as needed for anxiety (MRI claustrophobia management), Disp: 2 tablet, Rfl: 3     EPINEPHrine (EPIPEN/ADRENACLICK/OR ANY BX GENERIC EQUIV) 0.3 MG/0.3ML injection 2-pack, As directed for bee stings, Disp: , Rfl:      ferrous fumarate 65 mg, [place]. FE,-Vitamin C 125 mg (VITRON-C)  MG TABS tablet, Take 1 tablet by mouth 3 times daily, Disp: 270 tablet, Rfl: 1     fludrocortisone (FLORINEF) 0.1 MG tablet, Take 1 tablet (0.1 mg) by mouth daily, Disp: 90 tablet, Rfl: 3     FLUoxetine (PROZAC) 40 MG capsule, Take 60 mg by mouth daily , Disp: , Rfl:      gabapentin (NEURONTIN) 300 MG capsule, Take 300 mg by mouth 3 times daily, Disp: , Rfl:      hydrocortisone (CORTEF) 10 MG tablet, Take 3 tablets (30 MG) every morning and take 2 tablets (20 MG) by mouth every day at 2 PM. Additional as directed., Disp: 500 tablet, Rfl: 3     levothyroxine (SYNTHROID/LEVOTHROID) 125 MCG tablet, Take 1 tablet (125 mcg) by mouth daily, Disp: 90 tablet, Rfl: 3     mitotane (LYSODREN) 500 MG tablet, 1500mg in morning plus 1000 in evening for an total daily dose of 2500 mg., Disp: 150 tablet, Rfl: 2     Multiple Vitamins-Calcium (ONE-A-DAY WOMENS PO), Take 2 tablets by mouth every morning , Disp: , Rfl:      ondansetron (ZOFRAN) 8 MG tablet, Take  1 tablet (8 mg) by mouth every 8 hours as needed for nausea, Disp: 30 tablet, Rfl: 0     prochlorperazine (COMPAZINE) 5 MG tablet, Take 1 tablet (5 mg) by mouth every 8 hours as needed for nausea or vomiting, Disp: 90 tablet, Rfl: 3     traZODone (DESYREL) 50 MG tablet, Take 2 tablets (100 mg) by mouth nightly as needed for sleep, Disp: 60 tablet, Rfl: 0     UNABLE TO FIND, medical cannabis (Patient's own supply. Not a prescription), Disp: , Rfl:      vitamin B1 (THIAMINE) 50 MG tablet, Take 1 tablet (50 mg) by mouth daily, Disp: 90 tablet, Rfl: 1    PHYSICAL EXAMINATION:  Vital signs: /76 (BP Location: Right arm, Patient Position: Sitting, Cuff Size: Adult Regular)   Pulse 94   Temp 99  F (37.2  C) (Oral)   Resp 16   Wt 69.3 kg (152 lb 12.8 oz)   SpO2 97%   BMI 26.21 kg/m    ECOG performance status of 1.   GENERAL: Young lady, sitting in chair. Appears tremulous, but in no acute distress.  HEENT: No icterus, no pallor. MMM, PERRL, OP clear.   NECK: Supple, no JVD/LAD.  LUNGS: CTAB, normal WOB on RA.  CARDIOVASCULAR: Regular rate and rhythm, no murmurs.   ABDOMEN: Soft, NT, ND, no masses appreciated, normal BS.   EXTREMITIES: No cyanosis, no clubbing, no edema.   NEUROLOGIC: Alert and fully oriented. Hands and feet are tremulous.   PSYCH: Affect flat, mood slightly anxious/depressed    LABORATORY DATA:   Lab Test 08/05/20  0736 07/16/20  0723 06/08/20  1352   WBC 4.3 4.1 6.2   RBC 4.61 4.31 4.51   HGB 14.5 13.4 13.5   HCT 44.6 41.9 42.1   MCV 97 97 93   MCH 31.5 31.1 29.9   MCHC 32.5 32.0 32.1   RDW 14.3 14.6 14.8   * 183 197   NEUTROPHIL 54.6 52.4 68.9    139 139   POTASSIUM 3.2* 3.4 4.0   CHLORIDE 108 107 106   CO2 24 29 28   ANIONGAP 8 3 5   * 75 89   BUN 10 10 9   CR 0.88 0.74 0.78   SHASHA 8.1* 8.1* 8.1*   PROTTOTAL 6.0* 5.4* 6.4*   ALBUMIN 3.0* 2.7* 3.1*   BILITOTAL 0.3 0.2 0.3   ALKPHOS 82 71 88   AST 16 18 16   ALT 21 26 22     Lab Test 08/05/20  0736 07/16/20  0723  06/08/20  1352 03/30/20  1120 02/26/20  1132   TSH 0.75 0.72 0.22* 0.37* 0.48   T4 0.96 0.85 1.06 1.06 1.08     Lab Test 08/05/20  0736 07/16/20  0723   CHOL 188 166    105   LDL 51 42   TRIG 118 93     DHEAS was 740 on 6/5/18, and has been <15 on 7/12/18, 8/20/18, 9/19/18, 10/23/18, 11/27/18, 1/9/19, 5/8/19, 6/18/19, 7/17/19, 11/15/19, 12/12/19, 1/22/20, 3/30/20 and 6/8/20, 7/21/20.  Mitotane level (target 14-20): 1.8ng/dl on 8/20/18, 3.5 on 10/25/18, 6.2 on 12/4/18, 8.1 on 1/9/19 and 10.2 on 2/11/19, 14.2 on 6/18/19. 10.8 on 12/12/19.  10 on 1/24/20. 8.9 on 5/14/20. 8.6 on 7/21/20.   Labs from [facility] 9/24/19 - WBC 4200, ANC 2600, Hb 10.4, Platelets 218K, lytes WNL, creat 0.61, Calcium 8, albumin 3.5, LFTs normal, mitotane level 18.8, cortisol 25.8, ACTH<5, aldosterone 4.9, free T4 1.17, TSH 0.07, DHEAS <15, renin plasma 10.6.     ASSESSMENT AND PLAN: A 34-year-old lady with right-sided functioning adrenocortical carcinoma.     Adrenocortical carcinoma:  - Started on adjuvant mitotane in July 2018 based on her presentation and tumor characteristics including invasion of the adrenal capsule, high mitotic rate, possibly positive margins, and high Ki67, which could result in a rate of recurrence as high as 40%.    - Restaging CT C/A/P 6/8/20 in detail showed no evidence of disease recurrence.  - She's > 2 years out from surgery and explained that this is an excellent finding as recurrence rates drop quite a bit after year 2.  - Despite multiple supportive care interventions, patient has NOT been able to tolerate higher doses (above 3gm/day) of mitotane.   - Mitotane levels ~8.5 despite 6-8 weeks of total daily dose of 2500mg. Wants to increase dose further. Will start mitotane 1500mg BID today. Advised to monitor closely. Will take ~2-3 months of this dose before the mitotane trough is accurate.  - Monthly Mitotane level, CBC, comprehensive panel, TSH, FT4, and DHEAS, and periodic cholesterol panel, 24-hour  Urine Free Cortisol.   - Plan to continue adjuvant Mitotane for up to 5 yrs if tolerated.  - Next restaging CT C/A/P in ~early 2020 (ordered today) right before a visit with myself and Dr. Huertas.     CP, SOA:  - Likely non-cardiopulm given lack of suggestive history, normal lipid profile, no PMH CV issues etc.  - Will repeat EKG today. Advised to contact PCP if symptoms worsen.       Hypocalcemia, mild, resolved:  - Continue 2 calcium tablets daily (1000 mg every day).    Endocrinopathies:  - Mgmt per endocrine team at the . Follow-up planned in the next couple months.    Insomnia:  - Trazodone is working well but she's taking 150mg at bedtime. Further mgmt per PCP.   - EKG with QTc 488 ms in 2019. Will repeat today given CP and SOA history.      Return to see DARLYN in a month and then me in 2 months.     BILLIN.    Benson Cintron M.D.  . Professor of Medicine  Genitourinary Oncology  Division of Hematology, Oncology & Transplantation  [facility]

## 2020-08-05 NOTE — NURSING NOTE
"Oncology Rooming Note    August 5, 2020 7:51 AM   Suzy Rodríguez is a 34 year old female who presents for:    Chief Complaint   Patient presents with     Blood Draw     Labs drawn via  by RN in lab. VS taken.     RECHECK     Malignant neoplasm of cortex of right adrenal gland (H)     Initial Vitals: /76 (BP Location: Right arm, Patient Position: Sitting, Cuff Size: Adult Regular)   Pulse 94   Temp 99  F (37.2  C) (Oral)   Resp 16   Wt 69.3 kg (152 lb 12.8 oz)   SpO2 97%   BMI 26.21 kg/m   Estimated body mass index is 26.21 kg/m  as calculated from the following:    Height as of 2/26/20: 1.626 m (5' 4.02\").    Weight as of this encounter: 69.3 kg (152 lb 12.8 oz). Body surface area is 1.77 meters squared.  No Pain (0) Comment: Data Unavailable   No LMP recorded. (Menstrual status: IUD).  Allergies reviewed: Yes  Medications reviewed: Yes    Medications: MEDICATION REFILLS NEEDED TODAY. Provider was notified.  Pharmacy name entered into [facility]: [facility] - [id number]    Clinical concerns: Refill Gabapentin.      Nataliya Montoya CMA          \    "

## 2020-08-05 NOTE — NURSING NOTE
Chief Complaint   Patient presents with     Blood Draw     Labs drawn via  by RN in lab. VS taken.     Camlia Carr RN

## 2020-08-06 ENCOUNTER — HOSPITAL ENCOUNTER (OUTPATIENT)
Dept: BEHAVIORAL HEALTH | Facility: CLINIC | Age: 35
End: 2020-08-06
Attending: PSYCHIATRY & NEUROLOGY
Payer: COMMERCIAL

## 2020-08-06 LAB
DHEA-S SERPL-MCNC: <15 UG/DL (ref 35–430)
INTERPRETATION ECG - MUSE: NORMAL

## 2020-08-06 PROCEDURE — 90853 GROUP PSYCHOTHERAPY: CPT | Mod: 95 | Performed by: SOCIAL WORKER

## 2020-08-06 PROCEDURE — 90853 GROUP PSYCHOTHERAPY: CPT | Mod: GT,95 | Performed by: SOCIAL WORKER

## 2020-08-06 NOTE — GROUP NOTE
Psychotherapy Group Note    PATIENT'S NAME: Suzy Rodríguez  MRN:   [id number]  :   1985  ACCT. NUMBER: [id number]  DATE OF SERVICE: 20  START TIME: 11:00 AM  END TIME: 11:50 AM  FACILITATOR: Sulma Metzger LICSW  TOPIC:  EBP Group: Enhanced Mindfulness  Adult Mental Health Day Treatment  TRACK: 5A    NUMBER OF PARTICIPANTS: 3    Summary of Group / Topics Discussed:  Enhanced Mindfulness: Body and Mind Integration: Patients received an overview and education regarding the importance of including the body in the management of emotional health and self-care and as a direct route to mindfulness practice.  Patients discussed various ways in which the body can serve as an informant to their physical and emotional experiences/need. Patients discussed the body as a direct link to the present moment and to mindfulness practice.  Patients discussed current relationship with body, self-awareness, mindfulness practice and barriers to connection with body.  Patients were guided through breath work and movement to facilitate greater self-awareness, grounding, self-expression, and connection to other.  Patients discussed how the experiential could be applied to better manage mental health and develop walker connection to self.    Patient Session Goals / Objectives:    Identify how movement awareness could be used for grounding, stress management, self-expression, connection to other and self-regulation    Improved awareness of breath and movement preferences    Identify how movement and the body is used in mindfulness practice    Reflect on use of these practices in everyday life.    Identify barriers to attending to body  Telemedicine Visit: The patient's condition can be safely assessed and treated via synchronous audio and visual telemedicine encounter.      Reason for Telemedicine Visit: Services only offered telehealth and covid19    Originating Site (Patient Location): Patient's home    Distant Site  (Provider Location): Provider Remote Setting    Consent:  The patient/guardian has verbally consented to: the potential risks and benefits of telemedicine (video visit) versus in person care; bill my insurance or make self-payment for services provided; and responsibility for payment of non-covered services.     Mode of Communication:  Video Conference via [employer]    As the provider I attest to compliance with applicable laws and regulations related to telemedicine.       Patient Participation / Response:  Moderately participated, sharing some personal reflections / insights and adequately adequately received / provided feedback with other participants.    Practiced skills in session    Treatment Plan:  Patient has a current master individualized treatment plan.  See Epic treatment plan for more information.    Sulma Metzger, TIMSW

## 2020-08-06 NOTE — GROUP NOTE
Process Group Note    PATIENT'S NAME: Suzy Rodríguez  MRN:   [id number]  :   1985  [id number]  DATE OF SERVICE: 20  START TIME:  9:00 AM  END TIME:  9:50 AM  FACILITATOR: Sulma Arshad LICSW  TOPIC:  Process Group    Diagnoses:  296.32 (F33.1) Major Depressive Disorder, Recurrent Episode, Moderate _ and With anxious distress  300.02 (F41.1) Generalized Anxiety Disorder      Adult Mental Health Day Treatment  TRACK: 5A    NUMBER OF PARTICIPANTS: 3    Telemedicine Visit: The patient's condition can be safely assessed and treated via synchronous audio and visual telemedicine encounter.      Reason for Telemedicine Visit: Services only offered telehealth    Originating Site (Patient Location): Patient's home    Distant Site (Provider Location): Provider Remote Setting    Consent:  The patient/guardian has verbally consented to: the potential risks and benefits of telemedicine (video visit) versus in person care; bill my insurance or make self-payment for services provided; and responsibility for payment of non-covered services.     Mode of Communication:  Video Conference via Weft    As the provider I attest to compliance with applicable laws and regulations related to telemedicine.           Data:    Session content: At the start of this group, patients were invited to check in by identifying themselves, describing their current emotional status, and identifying issues to address in this group.   Area(s) of treatment focus addressed in this session included Symptom Management, Personal Safety and Community Resources/Discharge Planning.  Suzy reported seeing her oncologist and finding ut that she still has some cancer cell, but kimble snot have evidence of disease.  She stated that the radiation may have killed them but they do not know this for sure.  She stated that the oncologist is increasing her chemotherapy dose which will make her feel more ill.  She stated that  she felt badly about not being able to keep the house clean.  Writer asked if clients two son's and spouse could work together as a team to help with the cleaning.  Client denied suicidal ideation, intent and plan.     Therapeutic Interventions/Treatment Strategies:  Psychotherapist offered support, feedback and validation and reinforced use of skills. Treatment modalities used include Cognitive Behavioral Therapy. Interventions include Relationship Skills: Assisted patients in implementing more effective communication skills in their relationships.    Assessment:    Patient response:   Patient responded to session by focusing on goals and being attentive    Possible barriers to participation / learning include: and no barriers identified    Health Issues:   Yes: cancer, Associated Psychological Distress       Substance Use Review:   Substance Use: No active concerns identified.    Mental Status/Behavioral Observations  Appearance:   Appropriate   Eye Contact:   Fair   Psychomotor Behavior: Normal   Attitude:   Interested Pleasant  Orientation:   All  Speech   Rate / Production: Normal    Volume:  Normal   Mood:    Anxious  Depressed   Affect:    Appropriate   Thought Content:   Rumination  Thought Form:  Coherent  Logical     Insight:    Good     Plan:     Safety Plan: No current safety concerns identified.  Recommended that patient call 911 or go to the local ED should there be a change in any of these risk factors.     Barriers to treatment: None identified    Patient Contracts (see media tab):  None    Substance Use: Not addressed in session     Continue or Discharge: Patient will continue in Adult Day Treatment (ADT)  as planned. Patient is likely to benefit from learning and using skills as they work toward the goals identified in their treatment plan.  Client wishes to proceed with discharge as planned on 8/13/20.  She agreed to call a previous therapist to see if she could resume therapy.        Sulma  Peg Arshad, [facility]  August 6, 2020

## 2020-08-06 NOTE — GROUP NOTE
Psychotherapy Group Note    PATIENT'S NAME: Suzy Rodríguez  MRN:   [id number]  :   1985  ACCT. NUMBER: [id number]  DATE OF SERVICE: 20  START TIME: 10:00 AM  END TIME: 10:50 AM  FACILITATOR: Sulma Arshad LICSW  TOPIC: MH EBP Group: Coping Skills  Adult Mental Health Day Treatment  TRACK: 5A    NUMBER OF PARTICIPANTS: 3    Summary of Group / Topics Discussed:  Coping Skills: Improve the Moment: Patients learned to tolerate distress, by applying strategies to effect positive change in the present moment.  Patients will identified situations where they would benefit from applying strategies to improve the moment and reduce distress. Patients discussed how to distinguish when this can be useful in their lives or when other strategies would be more relevant or helpful.    Patient Session Goals / Objectives:    Discuss how the use of intentional  in the moment  actions can help reduce distress.    Review patients current practices and discuss a more formal way of practicing and accessing skills.    Increase ability to decide when to use improve the moment strategies    Choose 1-2 in the moment actions to apply during times of distress.    Telemedicine Visit: The patient's condition can be safely assessed and treated via synchronous audio and visual telemedicine encounter.      Reason for Telemedicine Visit: Services only offered telehealth    Originating Site (Patient Location): Patient's home    Distant Site (Provider Location): Provider Remote Setting    Consent:  The patient/guardian has verbally consented to: the potential risks and benefits of telemedicine (video visit) versus in person care; bill my insurance or make self-payment for services provided; and responsibility for payment of non-covered services.     Mode of Communication:  Video Conference via VibeWrite    As the provider I attest to compliance with applicable laws and regulations related to telemedicine.         Patient  Participation / Response:  Fully participated with the group by sharing personal reflections / insights and openly received / provided feedback with other participants.    Identified 2-3 positive coping strategies that have helped maintain / improve symptoms in the past    Treatment Plan:  Patient has a current master individualized treatment plan.  See Epic treatment plan for more information.    Sulma Arshad, LICSW

## 2020-08-07 LAB — MISCELLANEOUS TEST: NORMAL

## 2020-08-10 ENCOUNTER — HOSPITAL ENCOUNTER (OUTPATIENT)
Dept: BEHAVIORAL HEALTH | Facility: CLINIC | Age: 35
End: 2020-08-10
Attending: PSYCHIATRY & NEUROLOGY
Payer: COMMERCIAL

## 2020-08-10 PROCEDURE — 90853 GROUP PSYCHOTHERAPY: CPT | Mod: GT | Performed by: SOCIAL WORKER
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
## 2020-08-11 ENCOUNTER — HOSPITAL ENCOUNTER (OUTPATIENT)
Dept: BEHAVIORAL HEALTH | Facility: CLINIC | Age: 35
End: 2020-08-11
Attending: PSYCHIATRY & NEUROLOGY
Payer: COMMERCIAL

## 2020-08-11 PROCEDURE — G0177 OPPS/PHP; TRAIN & EDUC SERV: HCPCS | Mod: GT

## 2020-08-11 PROCEDURE — 90853 GROUP PSYCHOTHERAPY: CPT | Mod: GT | Performed by: SOCIAL WORKER

## 2020-08-11 PROCEDURE — 90853 GROUP PSYCHOTHERAPY: CPT | Mod: GT,95 | Performed by: SOCIAL WORKER

## 2020-08-11 NOTE — GROUP NOTE
Psychoeducation Group Note    PATIENT'S NAME: Suzy Rodríguez  MRN:   [id number]  :   1985  ACCT. NUMBER: [id number]  DATE OF SERVICE: 20  START TIME: 10:00 AM  END TIME: 10:50 AM  FACILITATOR: Ap Montanez OTR/L  TOPIC: MH Life Skills Group: Resiliency Development  Telemedicine Visit: The patient's condition can be safely assessed and treated via synchronous audio and visual telemedicine encounter.      Reason for Telemedicine Visit: COVID-19    Originating Site (Patient Location): Patient's home    Distant Site (Provider Location): [facility]: [facility]    Consent:  The patient/guardian has verbally consented to: the potential risks and benefits of telemedicine (video visit) versus in person care; bill my insurance or make self-payment for services provided; and responsibility for payment of non-covered services.     Mode of Communication:  Video Conference via TwitChat    As the provider I attest to compliance with applicable laws and regulations related to telemedicine.   Adult Mental Health Day Treatment  TRACK: 5A    NUMBER OF PARTICIPANTS: 4    Summary of Group / Topics Discussed:  Resiliency Development:  Coping Skills-Personal Recovery and Motivation: Patients were taught how to identify stressors, signs of stress, coping skills, and prevention strategies for overall stress management.  Patients were given the opportunity to identify both ongoing and acute mental health symptoms and how to effectively manage these symptoms by developing an effective aftercare plan.  Patients increased awareness of community based resources.    Patient Session Goals / Objectives:    Identified how using coping skills can be used for symptom and stress management       Improved awareness of individualed symptoms and stressors and how to effectively cope     Established a relapse prevention plan to practice these skills in their own environments    Practiced and reflected on how  to generalize taught skills to their everyday life          Patient Participation / Response:  Fully participated with the group by sharing personal reflections / insights and openly received / provided feedback with other participants.    Patient Presentation: Calm,alert,focused with stable mood and thought process.    Treatment Plan:  Patient has a current master individualized treatment plan.  See Epic treatment plan for more information.    Ap Montanez, OTR/L

## 2020-08-11 NOTE — GROUP NOTE
Psychotherapy Group Note    PATIENT'S NAME: Suzy Rodríguez  MRN:   [id number]  :   1985  ACCT. NUMBER: [id number]  DATE OF SERVICE: 20  START TIME: 10:00 AM  END TIME: 10:50 AM  FACILITATOR: Sulma Arshad LICSW  TOPIC:  EBP Group: Behavioral Activation  Adult Mental Health Day Treatment  TRACK: 5A    NUMBER OF PARTICIPANTS: 3    Summary of Group / Topics Discussed:  Behavioral Activation: Motivation and Procrastination: Patients explored how they currently spend their time, identifying thoughts and feelings that are motivating and serve to increase desired behaviors.  They also examined behaviors that contribute to procrastination.  Different types of procrastination behaviors were identified, and strategies to reduce individual procrastination and increase motivation were explored and practiced.  Patients identified ways to increase goal-directed activities to enhance mood and reduce symptoms.        Patient Session Goals / Objectives:    Identify current patterns of procrastination behavior and how they influence thoughts and moods, and inhibit motivation.    Identify behaviors that can be implemented that contribute to improving thoughts and feelings, motivation, and reduce symptoms.    Identify and develop a plan to increase activities that promote a sense of accomplishment and competence.    Practice scheduling positive activities / behaviors into daily routines.    Telemedicine Visit: The patient's condition can be safely assessed and treated via synchronous audio and visual telemedicine encounter.      Reason for Telemedicine Visit: Services only offered telehealth    Originating Site (Patient Location): Patient's home    Distant Site (Provider Location): Provider Remote Setting    Consent:  The patient/guardian has verbally consented to: the potential risks and benefits of telemedicine (video visit) versus in person care; bill my insurance or make self-payment for services  provided; and responsibility for payment of non-covered services.     Mode of Communication:  Video Conference via FanMob    As the provider I attest to compliance with applicable laws and regulations related to telemedicine.       Patient Participation / Response:  Fully participated with the group by sharing personal reflections / insights and openly received / provided feedback with other participants.    Expressed understanding of the relationship between behaviors, thoughts, and feelings    Treatment Plan:  Patient has a current master individualized treatment plan.  See Epic treatment plan for more information.    Sulma Arshad, [facility]
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
## 2020-08-13 ENCOUNTER — RECORDS - HEALTHEAST (OUTPATIENT)
Dept: ADMINISTRATIVE | Facility: OTHER | Age: 35
End: 2020-08-13

## 2020-08-13 ENCOUNTER — HOSPITAL ENCOUNTER (OUTPATIENT)
Dept: BEHAVIORAL HEALTH | Facility: CLINIC | Age: 35
End: 2020-08-13
Attending: PSYCHIATRY & NEUROLOGY
Payer: COMMERCIAL

## 2020-08-13 ENCOUNTER — COMMUNICATION - HEALTHEAST (OUTPATIENT)
Dept: FAMILY MEDICINE | Facility: CLINIC | Age: 35
End: 2020-08-13

## 2020-08-13 DIAGNOSIS — F19.982 DRUG-INDUCED INSOMNIA (H): ICD-10-CM

## 2020-08-13 PROCEDURE — 90853 GROUP PSYCHOTHERAPY: CPT | Mod: GT,95 | Performed by: SOCIAL WORKER

## 2020-08-13 PROCEDURE — 90853 GROUP PSYCHOTHERAPY: CPT | Mod: GT | Performed by: SOCIAL WORKER

## 2020-08-13 NOTE — GROUP NOTE
Psychotherapy Group Note    PATIENT'S NAME: Suzy Rodríguez  MRN:   [id number]  [facility]   1985  [id number]  DATE OF SERVICE: 20  START TIME: 10:00 AM  END TIME: 10:50 AM  FACILITATOR: Sulma Arshad LICSW  TOPIC: MH EBP Group: Behavioral Activation  Adult Mental Health Day Treatment  TRACK: 5A    NUMBER OF PARTICIPANTS: 5    Summary of Group / Topics Discussed:  Behavioral Activation: [place] Ahead: {Patients identified situations that prompt unwanted and unhelpful emotions / thoughts / behaviors.   Patients discussed how to problem solve by proactively using coping skills in potentially difficult situations. Components included describing the situation, brainstorming coping skills, imagining how scenario can/will unfold, rehearsing the action plan, and practicing relaxation to follow.  Patients practiced using these skills to reduce symptom distress and increase effective coping  behaviors.      Patient Session Goals / Objectives:    Identify difficult situation(s), and gain proficiency with alternative behaviors / skills to problem solve.    Increase confidence using coping skills through group practice in session.    Receive and provide feedback regarding skill development.    Apply coping skills in daily life situations.    Telemedicine Visit: The patient's condition can be safely assessed and treated via synchronous audio and visual telemedicine encounter.      Reason for Telemedicine Visit: Patient has requested telehealth visit    Originating Site (Patient Location): Patient's home    Distant Site (Provider Location): Provider Remote Setting    Consent:  The patient/guardian has verbally consented to: the potential risks and benefits of telemedicine (video visit) versus in person care; bill my insurance or make self-payment for services provided; and responsibility for payment of non-covered services.     Mode of Communication:  Video Conference via KDS    As the provider  I attest to compliance with applicable laws and regulations related to telemedicine.       Patient Participation / Response:  Fully participated with the group by sharing personal reflections / insights and openly received / provided feedback with other participants.    Expressed understanding of the relationship between behaviors, thoughts, and feelings    Treatment Plan:  Patient has See Epic Treatment Plan - Patient is discharging.    Sulma Arshad, LICSW

## 2020-08-13 NOTE — GROUP NOTE
Process Group Note    PATIENT'S NAME: Suzy Rodríguez  MRN:   [id number]  :   1985  ACCT. NUMBER: [id number]  DATE OF SERVICE: 20  START TIME:  9:00 AM  END TIME:  9:50 AM  FACILITATOR: Sulma Arshad LICSW  TOPIC:  Process Group    Diagnoses:  296.32 (F33.1) Major Depressive Disorder, Recurrent Episode, Moderate _ and With anxious distress  300.02 (F41.1) Generalized Anxiety Disorder      Adult Mental Health Day Treatment  TRACK: 5A    NUMBER OF PARTICIPANTS: 5    Telemedicine Visit: The patient's condition can be safely assessed and treated via synchronous audio and visual telemedicine encounter.      Reason for Telemedicine Visit: Services only offered telehealth    Originating Site (Patient Location): Patient's home    Distant Site (Provider Location): Provider Remote Setting    Consent:  The patient/guardian has verbally consented to: the potential risks and benefits of telemedicine (video visit) versus in person care; bill my insurance or make self-payment for services provided; and responsibility for payment of non-covered services.     Mode of Communication:  Video Conference via [employer]    As the provider I attest to compliance with applicable laws and regulations related to telemedicine.           Data:    Session content: At the start of this group, patients were invited to check in by identifying themselves, describing their current emotional status, and identifying issues to address in this group.   Area(s) of treatment focus addressed in this session included Symptom Management, Personal Safety and Community Resources/Discharge Planning.  Client shared discharge plan with the group.  Client was receptive to feedback from peers on their strengths and progress in the program. Client denied suicidal ideation, intent and plan. Suzy reported feeling ready to complete the program.  She reported stable mood.  She reported skills she used while having anxiety about financial  plans in a conversation with her .      Therapeutic Interventions/Treatment Strategies:  Psychotherapist offered support, feedback and validation and reinforced use of skills. Treatment modalities used include Cognitive Behavioral Therapy. Interventions include Relationship Skills: Assisted patients in implementing more effective communication skills in their relationships.    Assessment:    Patient response:   Patient responded to session by focusing on goals and being attentive    Possible barriers to participation / learning include: and no barriers identified    Health Issues:   Yes: cancer, Associated Psychological Distress       Substance Use Review:   Substance Use: No active concerns identified.    Mental Status/Behavioral Observations  Appearance:   Appropriate   Eye Contact:   Good   Psychomotor Behavior: Normal   Attitude:   Cooperative   Orientation:   All  Speech   Rate / Production: Normal    Volume:  Normal   Mood:    Anxious  Depressed   Affect:    Appropriate   Thought Content:   Clear  Thought Form:  Coherent  Logical     Insight:    Good     Plan:     Safety Plan: No current safety concerns identified.  Recommended that patient call 911 or go to the local ED should there be a change in any of these risk factors.     Barriers to treatment: None identified    Patient Contracts (see media tab):  None    Substance Use: Not addressed in session     Continue or Discharge: Patient will continue in Adult Day Treatment (ADT)  as planned. Patient is likely to benefit from learning and using skills as they work toward the goals identified in their treatment plan.      Sulma Arshad, [facility]  August 13, 2020

## 2020-08-13 NOTE — GROUP NOTE
Psychotherapy Group Note    PATIENT'S NAME: Suzy Rodríguez  MRN:   [id number]  :   1985  ACCT. NUMBER: [id number]  DATE OF SERVICE: 20  START TIME: 11:00 AM  END TIME: 11:50 AM  FACILITATOR: Sulma Metzger LICSW  TOPIC:  EBP Group: Enhanced Mindfulness  Adult Mental Health Day Treatment  TRACK: 5A    NUMBER OF PARTICIPANTS: 5    Summary of Group / Topics Discussed:  Enhanced Mindfulness: Body and Mind Integration: Patients received an overview and education regarding the importance of including the body in the management of emotional health and self-care and as a direct route to mindfulness practice.  Patients discussed various ways in which the body can serve as an informant to their physical and emotional experiences/need. Patients discussed the body as a direct link to the present moment and to mindfulness practice.  Patients discussed current relationship with body, self-awareness, mindfulness practice and barriers to connection with body.  Patients were guided through breath work and movement to facilitate greater self-awareness, grounding, self-expression, and connection to other.  Patients discussed how the experiential could be applied to better manage mental health and develop walker connection to self.    Patient Session Goals / Objectives:    Identify how movement awareness could be used for grounding, stress management, self-expression, connection to other and self-regulation    Improved awareness of breath and movement preferences    Identify how movement and the body is used in mindfulness practice    Reflect on use of these practices in everyday life.    Identify barriers to attending to body  Telemedicine Visit: The patient's condition can be safely assessed and treated via synchronous audio and visual telemedicine encounter.      Reason for Telemedicine Visit: Services only offered telehealth and covid19    Originating Site (Patient Location): Patient's home    Distant Site  (Provider Location): Provider Remote Setting    Consent:  The patient/guardian has verbally consented to: the potential risks and benefits of telemedicine (video visit) versus in person care; bill my insurance or make self-payment for services provided; and responsibility for payment of non-covered services.     Mode of Communication:  Video Conference via [employer]    As the provider I attest to compliance with applicable laws and regulations related to telemedicine.       Patient Participation / Response:  Moderately participated, sharing some personal reflections / insights and adequately adequately received / provided feedback with other participants.    Practiced skills in session    Treatment Plan:  Patient has See Epic Treatment Plan - Patient is discharging.    YSABEL Rivera

## 2020-08-14 ENCOUNTER — RECORDS - HEALTHEAST (OUTPATIENT)
Dept: HEALTH INFORMATION MANAGEMENT | Facility: CLINIC | Age: 35
End: 2020-08-14

## 2020-08-14 LAB — LAB SCANNED RESULT: NORMAL

## 2020-08-18 ENCOUNTER — OFFICE VISIT - HEALTHEAST (OUTPATIENT)
Dept: BEHAVIORAL HEALTH | Facility: CLINIC | Age: 35
End: 2020-08-18

## 2020-08-18 DIAGNOSIS — F33.9 MAJOR DEPRESSIVE DISORDER, RECURRENT EPISODE (H): ICD-10-CM

## 2020-08-18 DIAGNOSIS — F41.1 GENERALIZED ANXIETY DISORDER: ICD-10-CM

## 2020-08-26 NOTE — DISCHARGE SUMMARY
Adult Mental Health Intensive Outpatient Discharge Summary/Instructions      Patient: Suzy Rodríguez MRN: [id number]   : 1985 Age: 34 year old Sex: female     Admission Date: 20  Discharge Date: 20  Diagnosis: 296.32 (F33.1) Major Depressive Disorder, Recurrent Episode, Moderate _ and With anxious distress  300.02 (F41.1) Generalized Anxiety Disorder    Focus of Treatment / Progress    Personal Safety: Suzy denied suicidal ideation at discharge.     * Follow your safety plan     * Call crisis lines as needed:    [facility] [phone] [facility] [phone]  [facility] [phone] Crisis Connection [phone]  [facility] [phone] [facility] [phone]  [facility] [phone] National Suicide Prevention [phone]  [facility] [phone] Suicide Prevention [phone]  [facility] [phone]    Managing symptoms of:  Suzy worked on skills to manage depressive and anxiety symptoms.   She also worked on communication skills and assertiveness.  She worked on skills to managing living with a serious health condition.    Community support/health:  [place] [address] ([phone]) [email or web address], [place] Crisis text line( Text [place] to 239005),  [facility]  [place], [place] ([phone])  [facility] [place], [place] ( [phone])       Managing Symptoms and Preventing Relapse    * Go to all of your appointments    * Take all medications as directed.      * Carry a current list if medication with you    * Do not use illicit (street) drugs.  Avoid alcohol    * Report these symptoms to your care team. These are early signs of relapse:   Thoughts of suicide   Losing more sleep   Increased confusion   Mood getting worse   Feeling more aggressive   Other:  Increased isolating, sleeping all day    *Use these skills daily:  Talk to someone you trust at least one time weekly, set boundaries  "and say \"no\", be assertive, act opposite of negative feelings, accept challenges with a positive attitude, exercise at least three times per week for 30 minutes,  get enough sleep, eat healthy foods, get into a good routine    Copy of summary sent to: In Epic    Follow up with psychiatrist / main caregiver: Dr. Bonilla    Next visit: monthly or as scheduled.    Follow up with your therapist: Suzy is calling to schedule a return to a previous therapist.   Next visit: To be scheduled.    Go to group therapy and / or support groups at: [facility] Connection and Depression Bipolar [facility](DBSA) support groups    See your medical doctor about:  For an annual physical exam or any general wellness or illness as needed.    Other:  Your treatment team appreciates having the opportunity to work with you and wishes you the best.    Client Signature:_______________________  Date / Time:___________  Staff Signature:________________________   Date / Time:___________      "

## 2020-09-11 ENCOUNTER — TELEPHONE (OUTPATIENT)
Dept: TRANSPLANT | Facility: CLINIC | Age: 35
End: 2020-09-11

## 2020-09-11 NOTE — TELEPHONE ENCOUNTER
[facility] tech called in re: to [facility] Mitotane. They stated they are in need of a new prescription showing her new plan for taking medication. She is now taking 3000 mg total daily of Mitotane and has therefore will run out sooner than insurance will agree to renew med. Requesting Dr. Cintron provide updated prescription so it can be refilled.     Message sent to Dr. Cintron.     LC LaneN, RN  RN Care Coordinator  [facility]

## 2020-09-14 ENCOUNTER — ANCILLARY PROCEDURE (OUTPATIENT)
Dept: CT IMAGING | Facility: CLINIC | Age: 35
End: 2020-09-14
Attending: INTERNAL MEDICINE
Payer: COMMERCIAL

## 2020-09-14 DIAGNOSIS — C74.01 MALIGNANT NEOPLASM OF CORTEX OF RIGHT ADRENAL GLAND (H): ICD-10-CM

## 2020-09-14 RX ORDER — METHYLPREDNISOLONE SODIUM SUCCINATE 125 MG/2ML
125 INJECTION, POWDER, LYOPHILIZED, FOR SOLUTION INTRAMUSCULAR; INTRAVENOUS
Status: CANCELLED
Start: 2020-10-23

## 2020-09-14 RX ORDER — MEPERIDINE HYDROCHLORIDE 25 MG/ML
25 INJECTION INTRAMUSCULAR; INTRAVENOUS; SUBCUTANEOUS EVERY 30 MIN PRN
Status: CANCELLED | OUTPATIENT
Start: 2020-10-23

## 2020-09-14 RX ORDER — EPINEPHRINE 1 MG/ML
0.3 INJECTION, SOLUTION INTRAMUSCULAR; SUBCUTANEOUS EVERY 5 MIN PRN
Status: CANCELLED | OUTPATIENT
Start: 2020-10-23

## 2020-09-14 RX ORDER — ALBUTEROL SULFATE 90 UG/1
1-2 AEROSOL, METERED RESPIRATORY (INHALATION)
Status: CANCELLED
Start: 2020-10-23

## 2020-09-14 RX ORDER — ALBUTEROL SULFATE 0.83 MG/ML
2.5 SOLUTION RESPIRATORY (INHALATION)
Status: CANCELLED | OUTPATIENT
Start: 2020-10-23

## 2020-09-14 RX ORDER — IOPAMIDOL 755 MG/ML
93 INJECTION, SOLUTION INTRAVASCULAR ONCE
Status: COMPLETED | OUTPATIENT
Start: 2020-09-14 | End: 2020-09-14

## 2020-09-14 RX ORDER — DIPHENHYDRAMINE HYDROCHLORIDE 50 MG/ML
50 INJECTION INTRAMUSCULAR; INTRAVENOUS
Status: CANCELLED
Start: 2020-10-23

## 2020-09-14 RX ORDER — SODIUM CHLORIDE 9 MG/ML
1000 INJECTION, SOLUTION INTRAVENOUS CONTINUOUS PRN
Status: CANCELLED
Start: 2020-10-23

## 2020-09-14 RX ORDER — EPINEPHRINE 0.3 MG/.3ML
0.3 INJECTION SUBCUTANEOUS EVERY 5 MIN PRN
Status: CANCELLED | OUTPATIENT
Start: 2020-10-23

## 2020-09-14 RX ADMIN — IOPAMIDOL 93 ML: 755 INJECTION, SOLUTION INTRAVASCULAR at 13:13

## 2020-09-16 ENCOUNTER — APPOINTMENT (OUTPATIENT)
Dept: LAB | Facility: CLINIC | Age: 35
End: 2020-09-16
Payer: COMMERCIAL

## 2020-09-16 ENCOUNTER — ONCOLOGY VISIT (OUTPATIENT)
Dept: ONCOLOGY | Facility: CLINIC | Age: 35
End: 2020-09-16
Attending: INTERNAL MEDICINE
Payer: COMMERCIAL

## 2020-09-16 VITALS
HEART RATE: 84 BPM | WEIGHT: 153.1 LBS | RESPIRATION RATE: 16 BRPM | TEMPERATURE: 98.3 F | BODY MASS INDEX: 26.27 KG/M2 | OXYGEN SATURATION: 100 % | DIASTOLIC BLOOD PRESSURE: 82 MMHG | SYSTOLIC BLOOD PRESSURE: 117 MMHG

## 2020-09-16 DIAGNOSIS — C74.01 MALIGNANT NEOPLASM OF CORTEX OF RIGHT ADRENAL GLAND (H): ICD-10-CM

## 2020-09-16 DIAGNOSIS — N95.1 MENOPAUSAL SYNDROME (HOT FLASHES): Primary | ICD-10-CM

## 2020-09-16 LAB
ALBUMIN SERPL-MCNC: 3 G/DL (ref 3.4–5)
ALP SERPL-CCNC: 85 U/L (ref 40–150)
ALT SERPL W P-5'-P-CCNC: 27 U/L (ref 0–50)
ANION GAP SERPL CALCULATED.3IONS-SCNC: 7 MMOL/L (ref 3–14)
AST SERPL W P-5'-P-CCNC: 22 U/L (ref 0–45)
BASOPHILS # BLD AUTO: 0 10E9/L (ref 0–0.2)
BASOPHILS NFR BLD AUTO: 0.4 %
BILIRUB SERPL-MCNC: 0.3 MG/DL (ref 0.2–1.3)
BUN SERPL-MCNC: 10 MG/DL (ref 7–30)
CALCIUM SERPL-MCNC: 7.8 MG/DL (ref 8.5–10.1)
CHLORIDE SERPL-SCNC: 107 MMOL/L (ref 94–109)
CHOLEST SERPL-MCNC: 175 MG/DL
CO2 SERPL-SCNC: 26 MMOL/L (ref 20–32)
CREAT SERPL-MCNC: 0.76 MG/DL (ref 0.52–1.04)
DIFFERENTIAL METHOD BLD: NORMAL
EOSINOPHIL # BLD AUTO: 0 10E9/L (ref 0–0.7)
EOSINOPHIL NFR BLD AUTO: 0.4 %
ERYTHROCYTE [DISTWIDTH] IN BLOOD BY AUTOMATED COUNT: 13.6 % (ref 10–15)
FSH SERPL-ACNC: 8.5 IU/L
GFR SERPL CREATININE-BSD FRML MDRD: >90 ML/MIN/{1.73_M2}
GLUCOSE SERPL-MCNC: 90 MG/DL (ref 70–99)
HCT VFR BLD AUTO: 43.4 % (ref 35–47)
HDLC SERPL-MCNC: 108 MG/DL
HGB BLD-MCNC: 14.5 G/DL (ref 11.7–15.7)
IMM GRANULOCYTES # BLD: 0 10E9/L (ref 0–0.4)
IMM GRANULOCYTES NFR BLD: 0.2 %
LDLC SERPL CALC-MCNC: 38 MG/DL
LYMPHOCYTES # BLD AUTO: 0.8 10E9/L (ref 0.8–5.3)
LYMPHOCYTES NFR BLD AUTO: 14.7 %
MCH RBC QN AUTO: 32.3 PG (ref 26.5–33)
MCHC RBC AUTO-ENTMCNC: 33.4 G/DL (ref 31.5–36.5)
MCV RBC AUTO: 97 FL (ref 78–100)
MONOCYTES # BLD AUTO: 0.2 10E9/L (ref 0–1.3)
MONOCYTES NFR BLD AUTO: 3.7 %
NEUTROPHILS # BLD AUTO: 4.6 10E9/L (ref 1.6–8.3)
NEUTROPHILS NFR BLD AUTO: 80.6 %
NONHDLC SERPL-MCNC: 67 MG/DL
NRBC # BLD AUTO: 0 10*3/UL
NRBC BLD AUTO-RTO: 0 /100
PLATELET # BLD AUTO: 197 10E9/L (ref 150–450)
POTASSIUM SERPL-SCNC: 3.4 MMOL/L (ref 3.4–5.3)
PROT SERPL-MCNC: 6 G/DL (ref 6.8–8.8)
RBC # BLD AUTO: 4.49 10E12/L (ref 3.8–5.2)
SODIUM SERPL-SCNC: 140 MMOL/L (ref 133–144)
T4 FREE SERPL-MCNC: 1.02 NG/DL (ref 0.76–1.46)
TRIGL SERPL-MCNC: 145 MG/DL
TSH SERPL DL<=0.005 MIU/L-ACNC: 0.63 MU/L (ref 0.4–4)
WBC # BLD AUTO: 5.6 10E9/L (ref 4–11)

## 2020-09-16 PROCEDURE — 82627 DEHYDROEPIANDROSTERONE: CPT | Performed by: INTERNAL MEDICINE

## 2020-09-16 PROCEDURE — 80053 COMPREHEN METABOLIC PANEL: CPT | Performed by: INTERNAL MEDICINE

## 2020-09-16 PROCEDURE — 99215 OFFICE O/P EST HI 40 MIN: CPT | Mod: ZP | Performed by: INTERNAL MEDICINE

## 2020-09-16 PROCEDURE — 82670 ASSAY OF TOTAL ESTRADIOL: CPT | Performed by: INTERNAL MEDICINE

## 2020-09-16 PROCEDURE — 36415 COLL VENOUS BLD VENIPUNCTURE: CPT

## 2020-09-16 PROCEDURE — G0463 HOSPITAL OUTPT CLINIC VISIT: HCPCS | Mod: ZF

## 2020-09-16 PROCEDURE — 36415 COLL VENOUS BLD VENIPUNCTURE: CPT | Performed by: INTERNAL MEDICINE

## 2020-09-16 PROCEDURE — 84443 ASSAY THYROID STIM HORMONE: CPT | Performed by: INTERNAL MEDICINE

## 2020-09-16 PROCEDURE — 84999 UNLISTED CHEMISTRY PROCEDURE: CPT | Performed by: INTERNAL MEDICINE

## 2020-09-16 PROCEDURE — 80061 LIPID PANEL: CPT | Performed by: INTERNAL MEDICINE

## 2020-09-16 PROCEDURE — 84439 ASSAY OF FREE THYROXINE: CPT | Performed by: INTERNAL MEDICINE

## 2020-09-16 PROCEDURE — 80375 DRUG/SUBSTANCE NOS 1-3: CPT | Performed by: INTERNAL MEDICINE

## 2020-09-16 PROCEDURE — 85025 COMPLETE CBC W/AUTO DIFF WBC: CPT | Performed by: INTERNAL MEDICINE

## 2020-09-16 PROCEDURE — 83001 ASSAY OF GONADOTROPIN (FSH): CPT | Performed by: INTERNAL MEDICINE

## 2020-09-16 ASSESSMENT — PAIN SCALES - GENERAL: PAINLEVEL: NO PAIN (0)

## 2020-09-16 NOTE — NURSING NOTE
"Oncology Rooming Note    September 16, 2020 2:55 PM   Suzy Rodríguez is a 34 year old female who presents for:    Chief Complaint   Patient presents with     Blood Draw     VS done, labs collected via venipuncture by lab RN.  Hx adrenal gland CA.     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    Weight as of this encounter: 69.4 kg (153 lb 1.6 oz). Body surface area is 1.77 meters squared.  No Pain (0) Comment: Data Unavailable   No LMP recorded. (Menstrual status: IUD).  Allergies reviewed: Yes  Medications reviewed: Yes    Medications: MEDICATION REFILLS NEEDED TODAY. Provider was notified.  Pharmacy name entered into [facility]: [facility] - [id number]    Clinical concerns: Would like refill for Arcadio Bliss CMA              "

## 2020-09-16 NOTE — NURSING NOTE
Chief Complaint   Patient presents with     Blood Draw     VS done, labs collected via venipuncture by lab RN.  Hx adrenal gland CA.

## 2020-09-16 NOTE — PROGRESS NOTES
"MEDICAL ONCOLOGY CLINIC NOTE    REFERRING PROVIDER: Warren Mansfield MD from [facility] Urologic Oncology clinic.      REASON FOR CURRENT VISIT: Follow-up for adrenocortical carcinoma, currently on adjuvant mitotane.    HISTORY OF PRESENT ILLNESS: Ms. 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     Suzy increased mitotane dose to 1500mg PO BID since 8/5/20 visit and reports hot flashes (several times a day), previously anorexia, gaining weight, nausea, headaches every day, bloating x 1 week. Fatigue moderate but improving. No slurred speech but does have \"loss of words\". BP is 117/81. Also has involuntary tremors similar (but less severe) than what she had with the higher doses in the past. Also on hydrocortisone 30mg QAM, 20mg early PM; and fluodrocortisone. Feels jittery after hydrocortisone doses.     No more nausea with mitotane so she stopped taking compazine. Denies any fever, diarrhea, dizziness, wt loss. Has also come off clonazepam and medical marijuana. No clinical signs/symptoms of recurrence.     Last visit (virtual) with Dr. Hilton at [facility] was on 3/24/20 and next planned for Oct 2020. She also follows with Dr. Veena Jaquez in our endocrinology department. No clinical evidence of disease recurrence.     ONCOLOGIC HISTORY:  1. 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   - CT abdomen and pelvis stone protocol without contrast 5 8016 showed \"9.2 x 8 cm heterogeneous right upper quadrant mass with small foci of calcification within it which is likely arising from the adrenal gland though inseparable from liver and upper pole of right kidney. It is incompletely evaluated on this noncontrast study. 3 x " "2.6 cm mass right lobe of liver on image #85 also incompletely evaluated. 6 x 4 x 4 mm upper third left ureteral obstructing stone with mild to moderate secondary hydronephrosis. Collection of stones at lower pole left kidney extending over an area of approximately 6 x 7 x 4 mm. Additional nonobstructing 1 mm stone upper pole left kidney. 2 mm nonobstructing stone noted upper pole right kidney with no hydronephrosis on the right. Postop change consistent with gastric bypass. Noncontrast images of spleen, left adrenal gland, gallbladder, and pancreas unremarkable. No aneurysm. No adenopathy.\"  - Seen by Dr. Delvalle at [facility] clinic. Referred to Dr. Alvino Patel and then Dr. Warren Mansfield.  - Endocrinology team directed workup showed high DHEAS level of 740 pre-surgery.   - Right open adrenalectomy and hepatic mobilization performed by Dr. Warren Mansfield on 6/25/2018. Pathology showed adrenocortical carcinoma measuring 11.3 cm, weighing 413 g with extension into or through the adrenal capsule negative lymphovascular invasion, tumor necrosis present, margins involved by tumor, no regional lymph nodes identified, pathologic stage T2 NX.  pathology review reveals low grade ACC.  - DHEAS normalized postsurgery.   - Adjuvant Mitotane started on 7/24/18. Dose increased to 2000mg TID around 10/23/18 due to persistently low troughs. Held 1/16/19 for two weeks and resumed 1/30 at 1000 mg po BID due to very poor tolerance of higher doses. Highest mitotane level was 10.2 on 2/11/19.  - Saw radiation oncology at [facility], radiation oncology at [facility], as well as endocrine oncology (Dr. Huertas) at [facility].   - S/p adjuvant RT by Dr. Monsivais - 5040 cGy over 30 fr (8/24/18-10/6/18).  - 2/11/19: OSH CT C/A/P and MRI brain with contrast - no evidence of disease recurrence.   - 06/08/20, 09/14/20: CT C/A/P with contrast - AFUA.    REVIEW OF SYSTEMS: 14 point ROS " negative other than the symptoms noted above in the HPI.    PAST MEDICAL HISTORY:  Past Medical History:   Diagnosis Date     Adrenal cortex cancer, right (H) 2018    Resected 18, Dr. Mansfield.     Adrenal mass (H)      Chocolate cyst of ovary      History of miscarriage      Malignant neoplasm of cortex of right adrenal gland (H) 2018    EOTD; 19.  Check in May. SCP started.  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) 2018   Cushing's syndrome, secondary to #1 (300 mcg/24 hr); Rx hydrocortisone 20 + 10 post op   Post operative defect right hemidiaphragm with ?worsening right effussion, not present preop   Currently on mitotane, 500 mg, BID x 1 week + hydrocortisone     MTHFR mutation (H)    MHTFR mutation with h/o mutiple miscarriages.    PAST SURGICAL HISTORY:   Past Surgical History:   Procedure Laterality Date     ADRENALECTOMY Right 2018    Procedure: ADRENALECTOMY;  Right Adrenalectomy,  Embolize Liver , Anesthesia Block;  Surgeon: Warren Mansfield MD;  Location: [facility] OR     ADRENALECTOMY        SECTION      twice      SECTION      2     EMBOLECTOMY ABDOMEN N/A 2018    Procedure: EMBOLECTOMY ABDOMEN;;  Surgeon: Ector Mcintyre MD;  Location: [facility] OR     EXTRACORPOREAL SHOCK WAVE LITHOTRIPSY (ESWL)       GASTRIC BYPASS        SOCIAL HISTORY:   Social History     Tobacco Use     Smoking status: Never Smoker     Smokeless tobacco: Never Used   Substance Use Topics     Alcohol use: Yes     Comment: occ.     Drug use: No     FAMILY HISTORY:   Family History   Problem Relation Age of Onset     Depression Paternal Grandmother      ALLERGIES:   Allergies   Allergen Reactions     Bee Venom Swelling     Ibuprofen Hives and Swelling     CURRENT MEDICATIONS:   Current Outpatient Medications:      acetaminophen (TYLENOL) 325 MG tablet, Take 2 tablets (650 mg) by mouth every 4 hours as needed for  other (multimodal surgical pain management along with NSAIDS and opioid medication as indicated based on pain control and physical function.), Disp: 150 tablet, Rfl: 0     buPROPion (WELLBUTRIN) 100 MG tablet, TAKE 1 TABLET (100 MG TOTAL) BY MOUTH 2 (TWO) TIMES A DAY., Disp: , Rfl: 3     calcium carbonate (OS-SHASHA) 500 MG tablet, Take 1 tablet (500 mg) by mouth 2 times daily, Disp: 180 tablet, Rfl: 3     cholecalciferol (VITAMIN D-1000 MAX ST) 1000 units TABS, Take 2,000 Units by mouth every morning , Disp: , Rfl:      diazepam (VALIUM) 5 MG tablet, Take 1 tablet (5 mg) by mouth once as needed for anxiety (MRI claustrophobia management), Disp: 2 tablet, Rfl: 3     EPINEPHrine (EPIPEN/ADRENACLICK/OR ANY BX GENERIC EQUIV) 0.3 MG/0.3ML injection 2-pack, As directed for bee stings, Disp: , Rfl:      ferrous fumarate 65 mg, [place]. FE,-Vitamin C 125 mg (VITRON-C)  MG TABS tablet, Take 1 tablet by mouth 3 times daily, Disp: 270 tablet, Rfl: 1     fludrocortisone (FLORINEF) 0.1 MG tablet, Take 1 tablet (0.1 mg) by mouth daily, Disp: 90 tablet, Rfl: 3     FLUoxetine (PROZAC) 40 MG capsule, Take 60 mg by mouth daily , Disp: , Rfl:      gabapentin (NEURONTIN) 300 MG capsule, Take 300 mg by mouth 3 times daily, Disp: , Rfl:      hydrocortisone (CORTEF) 10 MG tablet, Take 3 tablets (30 MG) every morning and take 2 tablets (20 MG) by mouth every day at 2 PM. Additional as directed., Disp: 500 tablet, Rfl: 3     levothyroxine (SYNTHROID/LEVOTHROID) 125 MCG tablet, Take 1 tablet (125 mcg) by mouth daily, Disp: 90 tablet, Rfl: 3     mitotane (LYSODREN) 500 MG tablet, 1500mg in morning plus 1000 in evening for an total daily dose of 2500 mg., Disp: 150 tablet, Rfl: 2     Multiple Vitamins-Calcium (ONE-A-DAY WOMENS PO), Take 2 tablets by mouth every morning , Disp: , Rfl:      ondansetron (ZOFRAN) 8 MG tablet, Take 1 tablet (8 mg) by mouth every 8 hours as needed for nausea, Disp: 30 tablet, Rfl: 0     prochlorperazine (COMPAZINE)  5 MG tablet, Take 1 tablet (5 mg) by mouth every 8 hours as needed for nausea or vomiting, Disp: 90 tablet, Rfl: 3     traZODone (DESYREL) 50 MG tablet, Take 2 tablets (100 mg) by mouth nightly as needed for sleep, Disp: 60 tablet, Rfl: 0     UNABLE TO FIND, medical cannabis (Patient's own supply. Not a prescription), Disp: , Rfl:      vitamin B1 (THIAMINE) 50 MG tablet, Take 1 tablet (50 mg) by mouth daily, Disp: 90 tablet, Rfl: 1    PHYSICAL EXAMINATION:  Vital signs: /82 (BP Location: Right arm, Patient Position: Sitting, Cuff Size: Adult Regular)   Pulse 84   Temp 98.3  F (36.8  C)   Resp 16   Wt 69.4 kg (153 lb 1.6 oz)   SpO2 100%   BMI 26.27 kg/m    ECOG performance status of 1.   GENERAL: Young lady, sitting in chair. Appears tremulous, but in no acute distress.  HEENT: No icterus, no pallor. MMM, PERRL, OP clear.   NECK: Supple, no JVD/LAD.  LUNGS: CTAB, normal WOB on RA.  CARDIOVASCULAR: Regular rate and rhythm, no murmurs.   ABDOMEN: Soft, NT, ND, no masses appreciated, normal BS.   EXTREMITIES: No cyanosis, no clubbing, no edema.   NEUROLOGIC: Alert and fully oriented. Hands and feet are tremulous.   PSYCH: Affect flat, mood slightly anxious/depressed    LABORATORY DATA:   Lab Test 09/16/20  1437 08/05/20  0736 07/16/20  0723   WBC 5.6 4.3 4.1   RBC 4.49 4.61 4.31   HGB 14.5 14.5 13.4   HCT 43.4 44.6 41.9   MCV 97 97 97   MCH 32.3 31.5 31.1   MCHC 33.4 32.5 32.0   RDW 13.6 14.3 14.6    142* 183   NEUTROPHIL 80.6 54.6 52.4    141 139   POTASSIUM 3.4 3.2* 3.4   CHLORIDE 107 108 107   CO2 26 24 29   ANIONGAP 7 8 3   GLC 90 102* 75   BUN 10 10 10   CR 0.76 0.88 0.74   SHASHA 7.8* 8.1* 8.1*   PROTTOTAL 6.0* 6.0* 5.4*   ALBUMIN 3.0* 3.0* 2.7*   BILITOTAL 0.3 0.3 0.2   ALKPHOS 85 82 71   AST 22 16 18   ALT 27 21 26     Lab Test 09/16/20  1437 08/05/20  0736 07/16/20  0723 06/08/20  1352 03/30/20  1120   TSH 0.63 0.75 0.72 0.22* 0.37*   T4 1.02 0.96 0.85 1.06 1.06     Lab Test 09/16/20  1437  08/05/20  0736   CHOL 175 188    114   LDL 38 51   TRIG 145 118     DHEAS was 740 on 6/5/18, and has been <15 on 7/12/18, 8/20/18, 9/19/18, 10/23/18, 11/27/18, 1/9/19, 5/8/19, 6/18/19, 7/17/19, 11/15/19, 12/12/19, 1/22/20, 3/30/20 and 6/8/20, 7/21/20, 8/5/20.  Mitotane level (target 14-20): 1.8ng/dl on 8/20/18, 3.5 on 10/25/18, 6.2 on 12/4/18, 8.1 on 1/9/19 and 10.2 on 2/11/19, 14.2 on 6/18/19. 10.8 on 12/12/19.  10 on 1/24/20. 8.9 on 5/14/20. 8.6 on 7/21/20, 8.3 on 8/5/20.   Labs from [facility] 9/24/19 - WBC 4200, ANC 2600, Hb 10.4, Platelets 218K, lytes WNL, creat 0.61, Calcium 8, albumin 3.5, LFTs normal, mitotane level 18.8, cortisol 25.8, ACTH<5, aldosterone 4.9, free T4 1.17, TSH 0.07, DHEAS <15, renin plasma 10.6.     ASSESSMENT AND PLAN: A 34-year-old lady with right-sided functioning adrenocortical carcinoma.     Adrenocortical carcinoma:  - Started on adjuvant mitotane in July 2018 based on her presentation and tumor characteristics including invasion of the adrenal capsule, high mitotic rate, possibly positive margins, and high Ki67, which could result in a rate of recurrence as high as 40%.    - Restaging CT C/A/P from 9/14/20 showed no evidence of disease recurrence.  - She's > 2 years out from surgery and explained that this is an excellent finding as recurrence rates drop quite a bit after year 2.  - Despite multiple supportive care interventions, patient has NOT been able to tolerate higher doses (above 3gm/day) of mitotane. She insists on continuing 1500mg PO BID despite side effects as above.  - Will follow-up on mitotane levels from today. Advised to monitor closely.   - Monthly Mitotane level, CBC, comprehensive panel, TSH, FT4, and DHEAS, and periodic cholesterol panel, 24-hour Urine Free Cortisol.   - Plan to continue adjuvant Mitotane for up to 5 yrs if tolerated.  - Next restaging CT C/A/P in ~Dec 2020/Jan 2021.     Headache, slurred speech:  - Likely due to mitotane but will get an MRI  brain with contrast to rule out any alternative etiologies. Monitor for new/worsening symptoms.    Hot flashes:  - Will check estradiol and FSH today but low likelihood of premature menopause.    Endocrinopathies:  - Mgmt per endocrine team at the . Follow-up planned in the next couple months.    Insomnia:  - Trazodone is working well but she's taking 150mg at bedtime. Wants to come off but doesn't remember exact dose.  - She'll send us a [employer] message with exact dose to help prescribe a taper.   - EKG with QTc 467 ms in 2020.      Return to see me in 6 weeks.     BILLIN    Benson Cintron M.D.  Maryt. Professor of Medicine  Genitourinary Oncology  Division of Hematology, Oncology & Transplantation  [facility]

## 2020-09-17 DIAGNOSIS — C74.01 MALIGNANT NEOPLASM OF CORTEX OF RIGHT ADRENAL GLAND (H): Primary | ICD-10-CM

## 2020-09-17 LAB
DHEA-S SERPL-MCNC: <15 UG/DL (ref 35–430)
ESTRADIOL SERPL-MCNC: 128 PG/ML
MISCELLANEOUS TEST: NORMAL

## 2020-09-21 DIAGNOSIS — F40.240 CLAUSTROPHOBIA: ICD-10-CM

## 2020-09-22 RX ORDER — DIAZEPAM 5 MG
5 TABLET ORAL
Qty: 2 TABLET | Refills: 0 | Status: SHIPPED | OUTPATIENT
Start: 2020-09-22 | End: 2021-10-25

## 2020-09-23 ENCOUNTER — HOSPITAL ENCOUNTER (OUTPATIENT)
Dept: MRI IMAGING | Facility: CLINIC | Age: 35
Discharge: HOME | End: 2020-09-23
Attending: INTERNAL MEDICINE | Admitting: INTERNAL MEDICINE
Payer: COMMERCIAL

## 2020-09-23 DIAGNOSIS — N95.1 MENOPAUSAL SYNDROME (HOT FLASHES): ICD-10-CM

## 2020-09-23 PROCEDURE — A9585 GADOBUTROL INJECTION: HCPCS | Performed by: INTERNAL MEDICINE

## 2020-09-23 PROCEDURE — 25500064 ZZH RX 255 OP 636: Performed by: INTERNAL MEDICINE

## 2020-09-23 PROCEDURE — 70553 MRI BRAIN STEM W/O & W/DYE: CPT

## 2020-09-23 RX ORDER — GADOBUTROL 604.72 MG/ML
7 INJECTION INTRAVENOUS ONCE
Status: COMPLETED | OUTPATIENT
Start: 2020-09-23 | End: 2020-09-23

## 2020-09-23 RX ADMIN — GADOBUTROL 7 ML: 604.72 INJECTION INTRAVENOUS at 08:23

## 2020-10-01 LAB — LAB SCANNED RESULT: NORMAL

## 2020-10-04 ENCOUNTER — MYC MEDICAL ADVICE (OUTPATIENT)
Dept: ONCOLOGY | Facility: CLINIC | Age: 35
End: 2020-10-04

## 2020-10-04 RX ORDER — EPINEPHRINE 1 MG/ML
0.3 INJECTION, SOLUTION, CONCENTRATE INTRAVENOUS EVERY 5 MIN PRN
Status: CANCELLED | OUTPATIENT
Start: 2020-10-04

## 2020-10-04 RX ORDER — EPINEPHRINE 0.3 MG/.3ML
0.3 INJECTION SUBCUTANEOUS EVERY 5 MIN PRN
Status: CANCELLED | OUTPATIENT
Start: 2020-10-04

## 2020-10-04 RX ORDER — SODIUM CHLORIDE 9 MG/ML
1000 INJECTION, SOLUTION INTRAVENOUS CONTINUOUS PRN
Status: CANCELLED
Start: 2020-10-04

## 2020-10-04 RX ORDER — ALBUTEROL SULFATE 90 UG/1
1-2 AEROSOL, METERED RESPIRATORY (INHALATION)
Status: CANCELLED
Start: 2020-10-04

## 2020-10-04 RX ORDER — DIPHENHYDRAMINE HYDROCHLORIDE 50 MG/ML
50 INJECTION INTRAMUSCULAR; INTRAVENOUS
Status: CANCELLED
Start: 2020-10-04

## 2020-10-04 RX ORDER — MEPERIDINE HYDROCHLORIDE 25 MG/ML
25 INJECTION INTRAMUSCULAR; INTRAVENOUS; SUBCUTANEOUS EVERY 30 MIN PRN
Status: CANCELLED | OUTPATIENT
Start: 2020-10-04

## 2020-10-04 RX ORDER — ALBUTEROL SULFATE 0.83 MG/ML
2.5 SOLUTION RESPIRATORY (INHALATION)
Status: CANCELLED | OUTPATIENT
Start: 2020-10-04

## 2020-10-05 ENCOUNTER — COMMUNICATION - HEALTHEAST (OUTPATIENT)
Dept: FAMILY MEDICINE | Facility: CLINIC | Age: 35
End: 2020-10-05

## 2020-10-08 ENCOUNTER — PATIENT OUTREACH (OUTPATIENT)
Dept: ONCOLOGY | Facility: CLINIC | Age: 35
End: 2020-10-08

## 2020-10-08 NOTE — PROGRESS NOTES
Suzy called inquiring about her mitotane level from 9/16. Informed her it resulted at 9.4 ug/ml. She would like to know if it is okay if she increases her dose by 500 mg (1 tablet) daily.     Per Dr. Cintron, okay to increase mitotaine dose by 500 mg to a daily total of 3500 mg. Iris verbalized understanding to the plan.     Encouraged Iris to call with any additional questions or concerns.     Yvonne Begum, LCN, RN  RN Care Coordinator  [facility]

## 2020-10-13 DIAGNOSIS — C74.01 MALIGNANT NEOPLASM OF CORTEX OF RIGHT ADRENAL GLAND (H): Primary | ICD-10-CM

## 2020-10-14 ENCOUNTER — MYC MEDICAL ADVICE (OUTPATIENT)
Dept: ONCOLOGY | Facility: CLINIC | Age: 35
End: 2020-10-14

## 2020-10-28 ENCOUNTER — RECORDS - HEALTHEAST (OUTPATIENT)
Dept: ADMINISTRATIVE | Facility: OTHER | Age: 35
End: 2020-10-28

## 2020-10-28 ENCOUNTER — ONCOLOGY VISIT (OUTPATIENT)
Dept: ONCOLOGY | Facility: CLINIC | Age: 35
End: 2020-10-28
Attending: INTERNAL MEDICINE
Payer: COMMERCIAL

## 2020-10-28 VITALS
WEIGHT: 157 LBS | BODY MASS INDEX: 26.94 KG/M2 | HEART RATE: 94 BPM | SYSTOLIC BLOOD PRESSURE: 125 MMHG | DIASTOLIC BLOOD PRESSURE: 80 MMHG | OXYGEN SATURATION: 99 % | RESPIRATION RATE: 16 BRPM | TEMPERATURE: 98.6 F

## 2020-10-28 DIAGNOSIS — F19.982 DRUG-INDUCED INSOMNIA (H): Primary | ICD-10-CM

## 2020-10-28 DIAGNOSIS — C74.01 MALIGNANT NEOPLASM OF CORTEX OF RIGHT ADRENAL GLAND (H): ICD-10-CM

## 2020-10-28 DIAGNOSIS — R25.1 TREMOR: ICD-10-CM

## 2020-10-28 LAB
ALBUMIN SERPL-MCNC: 3 G/DL (ref 3.4–5)
ALP SERPL-CCNC: 73 U/L (ref 40–150)
ALT SERPL W P-5'-P-CCNC: 20 U/L (ref 0–50)
ANION GAP SERPL CALCULATED.3IONS-SCNC: 5 MMOL/L (ref 3–14)
AST SERPL W P-5'-P-CCNC: 18 U/L (ref 0–45)
BASOPHILS # BLD AUTO: 0 10E9/L (ref 0–0.2)
BASOPHILS NFR BLD AUTO: 0.3 %
BILIRUB SERPL-MCNC: 0.3 MG/DL (ref 0.2–1.3)
BUN SERPL-MCNC: 9 MG/DL (ref 7–30)
CALCIUM SERPL-MCNC: 8.1 MG/DL (ref 8.5–10.1)
CHLORIDE SERPL-SCNC: 110 MMOL/L (ref 94–109)
CO2 SERPL-SCNC: 26 MMOL/L (ref 20–32)
CREAT SERPL-MCNC: 0.67 MG/DL (ref 0.52–1.04)
DIFFERENTIAL METHOD BLD: ABNORMAL
EOSINOPHIL # BLD AUTO: 0 10E9/L (ref 0–0.7)
EOSINOPHIL NFR BLD AUTO: 0.3 %
ERYTHROCYTE [DISTWIDTH] IN BLOOD BY AUTOMATED COUNT: 13.3 % (ref 10–15)
GFR SERPL CREATININE-BSD FRML MDRD: >90 ML/MIN/{1.73_M2}
GLUCOSE SERPL-MCNC: 85 MG/DL (ref 70–99)
HCT VFR BLD AUTO: 40.8 % (ref 35–47)
HGB BLD-MCNC: 13.3 G/DL (ref 11.7–15.7)
IMM GRANULOCYTES # BLD: 0 10E9/L (ref 0–0.4)
IMM GRANULOCYTES NFR BLD: 0.3 %
LYMPHOCYTES # BLD AUTO: 0.8 10E9/L (ref 0.8–5.3)
LYMPHOCYTES NFR BLD AUTO: 8.3 %
MCH RBC QN AUTO: 32.2 PG (ref 26.5–33)
MCHC RBC AUTO-ENTMCNC: 32.6 G/DL (ref 31.5–36.5)
MCV RBC AUTO: 99 FL (ref 78–100)
MONOCYTES # BLD AUTO: 0.7 10E9/L (ref 0–1.3)
MONOCYTES NFR BLD AUTO: 6.5 %
NEUTROPHILS # BLD AUTO: 8.5 10E9/L (ref 1.6–8.3)
NEUTROPHILS NFR BLD AUTO: 84.3 %
NRBC # BLD AUTO: 0 10*3/UL
NRBC BLD AUTO-RTO: 0 /100
PLATELET # BLD AUTO: 145 10E9/L (ref 150–450)
POTASSIUM SERPL-SCNC: 3.6 MMOL/L (ref 3.4–5.3)
PROT SERPL-MCNC: 5.7 G/DL (ref 6.8–8.8)
RBC # BLD AUTO: 4.13 10E12/L (ref 3.8–5.2)
SODIUM SERPL-SCNC: 140 MMOL/L (ref 133–144)
T4 FREE SERPL-MCNC: 1.06 NG/DL (ref 0.76–1.46)
TSH SERPL DL<=0.005 MIU/L-ACNC: 0.2 MU/L (ref 0.4–4)
WBC # BLD AUTO: 10.1 10E9/L (ref 4–11)

## 2020-10-28 PROCEDURE — 99215 OFFICE O/P EST HI 40 MIN: CPT | Performed by: INTERNAL MEDICINE

## 2020-10-28 PROCEDURE — 84443 ASSAY THYROID STIM HORMONE: CPT | Performed by: INTERNAL MEDICINE

## 2020-10-28 PROCEDURE — 84439 ASSAY OF FREE THYROXINE: CPT | Performed by: INTERNAL MEDICINE

## 2020-10-28 PROCEDURE — 80053 COMPREHEN METABOLIC PANEL: CPT | Performed by: INTERNAL MEDICINE

## 2020-10-28 PROCEDURE — 82627 DEHYDROEPIANDROSTERONE: CPT | Performed by: INTERNAL MEDICINE

## 2020-10-28 PROCEDURE — 84999 UNLISTED CHEMISTRY PROCEDURE: CPT | Performed by: INTERNAL MEDICINE

## 2020-10-28 PROCEDURE — 85025 COMPLETE CBC W/AUTO DIFF WBC: CPT | Performed by: INTERNAL MEDICINE

## 2020-10-28 PROCEDURE — G0463 HOSPITAL OUTPT CLINIC VISIT: HCPCS

## 2020-10-28 PROCEDURE — 80375 DRUG/SUBSTANCE NOS 1-3: CPT | Performed by: INTERNAL MEDICINE

## 2020-10-28 PROCEDURE — 36415 COLL VENOUS BLD VENIPUNCTURE: CPT

## 2020-10-28 RX ORDER — METHYLPREDNISOLONE SODIUM SUCCINATE 125 MG/2ML
125 INJECTION, POWDER, LYOPHILIZED, FOR SOLUTION INTRAMUSCULAR; INTRAVENOUS
Status: CANCELLED
Start: 2020-10-28

## 2020-10-28 RX ORDER — ALBUTEROL SULFATE 0.83 MG/ML
2.5 SOLUTION RESPIRATORY (INHALATION)
Status: CANCELLED | OUTPATIENT
Start: 2020-10-28

## 2020-10-28 RX ORDER — DIPHENHYDRAMINE HYDROCHLORIDE 50 MG/ML
50 INJECTION INTRAMUSCULAR; INTRAVENOUS
Status: CANCELLED
Start: 2020-10-28

## 2020-10-28 RX ORDER — SODIUM CHLORIDE 9 MG/ML
1000 INJECTION, SOLUTION INTRAVENOUS CONTINUOUS PRN
Status: CANCELLED
Start: 2020-10-28

## 2020-10-28 RX ORDER — MEPERIDINE HYDROCHLORIDE 25 MG/ML
25 INJECTION INTRAMUSCULAR; INTRAVENOUS; SUBCUTANEOUS EVERY 30 MIN PRN
Status: CANCELLED | OUTPATIENT
Start: 2020-10-28

## 2020-10-28 RX ORDER — TRAZODONE HYDROCHLORIDE 50 MG/1
TABLET, FILM COATED ORAL
Qty: 196 TABLET | Refills: 0 | Status: SHIPPED | OUTPATIENT
Start: 2020-10-28 | End: 2021-03-10

## 2020-10-28 RX ORDER — EPINEPHRINE 1 MG/ML
0.3 INJECTION, SOLUTION INTRAMUSCULAR; SUBCUTANEOUS EVERY 5 MIN PRN
Status: CANCELLED | OUTPATIENT
Start: 2020-10-28

## 2020-10-28 RX ORDER — ALBUTEROL SULFATE 90 UG/1
1-2 AEROSOL, METERED RESPIRATORY (INHALATION)
Status: CANCELLED
Start: 2020-10-28

## 2020-10-28 RX ORDER — EPINEPHRINE 0.3 MG/.3ML
0.3 INJECTION SUBCUTANEOUS EVERY 5 MIN PRN
Status: CANCELLED | OUTPATIENT
Start: 2020-10-28

## 2020-10-28 ASSESSMENT — PAIN SCALES - GENERAL: PAINLEVEL: NO PAIN (0)

## 2020-10-28 NOTE — NURSING NOTE
"Oncology Rooming Note    October 28, 2020 3:55 PM   Suzy Rodríguez is a 35 year old female who presents for:    Chief Complaint   Patient presents with     Blood Draw     Vitals and blood drawn via VPT by LPN. Pt checked into appt.      Oncology Clinic Visit     MALIGNANT NEOPLASM OF CORTEX OF RIGHT ADRENAL GLAND      Initial Vitals: /80   Pulse 94   Temp 98.6  F (37  C) (Oral)   Resp 16   Wt 71.2 kg (157 lb)   SpO2 99%   BMI 26.94 kg/m   Estimated body mass index is 26.94 kg/m  as calculated from the following:    Height as of 2/26/20: 1.626 m (5' 4.02\").    Weight as of this encounter: 71.2 kg (157 lb). Body surface area is 1.79 meters squared.  No Pain (0) Comment: Data Unavailable   No LMP recorded. (Menstrual status: IUD).  Allergies reviewed: Yes  Medications reviewed: Yes    Medications: MEDICATION REFILLS NEEDED TODAY. Provider was NOT notified. Patient needs refill on Mitotane.   Pharmacy name entered into [email or web address]: [facility] - [id number]    Clinical concerns: No new concerns today.      Genny Bishop MA            "

## 2020-10-28 NOTE — PROGRESS NOTES
"MEDICAL ONCOLOGY CLINIC NOTE    REFERRING PROVIDER: Warren Mansfield MD from [facility].      REASON FOR CURRENT VISIT: Follow-up for adrenocortical carcinoma, currently on adjuvant mitotane.    HISTORY OF PRESENT ILLNESS: Ms. 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     Suzy increased mitotane dose to 1500mg PO BID since 8/5/20 visit and then to 1500 AM+2000 PM in mid Sept 2020. Reports hot flashes (several times a day), previously anorexia, gaining weight, nausea, headaches every day, bloating x 1 week. Fatigue moderate, no slurred speech but does have \"loss of words\". Also has involuntary tremors similar to what she had with the higher doses in the past. Has also come off clonazepam but anxiety significant. Also has significant insomnia. Saw Dr. Huertas in Oct 2020 and recommended to increase mitotane to 2000mg BID and continue hydrocortisone 30mg QAM, 20mg early PM. Also advised to increase fluodrocortisone to 0.1mg/day and levothyroxine to 125 mcg/day and then 2 tablets on Sunday. She also follows with Dr. Veena Jaquez in our endocrinology department.     No more nausea with mitotane so she stopped taking compazine. Denies any fever, diarrhea, dizziness, wt loss. No clinical signs/symptoms of recurrence although she has non-specific MSK RU chest pain and SANCHEZ.    ONCOLOGIC HISTORY:  1. 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   - CT abdomen and pelvis stone protocol without contrast 5 8018 showed \"9.2 x 8 cm heterogeneous right upper quadrant mass with small foci of calcification within it which is likely arising from the adrenal gland though inseparable from liver and upper " "pole of right kidney. It is incompletely evaluated on this noncontrast study. 3 x 2.6 cm mass right lobe of liver on image #85 also incompletely evaluated. 6 x 4 x 4 mm upper third left ureteral obstructing stone with mild to moderate secondary hydronephrosis. Collection of stones at lower pole left kidney extending over an area of approximately 6 x 7 x 4 mm. Additional nonobstructing 1 mm stone upper pole left kidney. 2 mm nonobstructing stone noted upper pole right kidney with no hydronephrosis on the right. Postop change consistent with gastric bypass. Noncontrast images of spleen, left adrenal gland, gallbladder, and pancreas unremarkable. No aneurysm. No adenopathy.\"  - Seen by Dr. Delvalle at [facility]. Referred to Dr. Alvino Patel and then Dr. Warren Mansfield.  - Endocrinology team directed workup showed high DHEAS level of 740 pre-surgery.   - Right open adrenalectomy and hepatic mobilization performed by Dr. Warren Mansfield on 6/25/2018. Pathology showed adrenocortical carcinoma measuring 11.3 cm, weighing 413 g with extension into or through the adrenal capsule negative lymphovascular invasion, tumor necrosis present, margins involved by tumor, no regional lymph nodes identified, pathologic stage T2 NX.  pathology review reveals low grade ACC.  - DHEAS normalized postsurgery.   - Adjuvant Mitotane started on 7/24/18. Dose increased to 2000mg TID around 10/23/18 due to persistently low troughs. Held 1/16/19 for two weeks and resumed 1/30 at 1000 mg po BID due to very poor tolerance of higher doses. Highest mitotane level was 10.2 on 2/11/19. Mitotane troughs did not rise above 10 despite increase in dose to 1500 BID and then 1500+2000. Started 2000mg BID on 10/28/20.   - Saw radiation oncology at [facility], radiation oncology at [facility], as well as endocrine oncology (Dr. Huertas) at [facility].   - S/p adjuvant RT by Dr. Monsivais - 5040 cGy over " 30 fr (18-10/6/18).  - 19: OSH CT C/A/P and MRI brain with contrast - no evidence of disease recurrence.   - 20, 20: CT C/A/P with contrast - AFUA.    REVIEW OF SYSTEMS: 14 point ROS negative other than the symptoms noted above in the HPI.    PAST MEDICAL HISTORY:  Past Medical History:   Diagnosis Date     Adrenal cortex cancer, right (H) 2018    Resected 18, Dr. Mansfield.     Adrenal mass (H)      Chocolate cyst of ovary      History of miscarriage      Malignant neoplasm of cortex of right adrenal gland (H) 2018    EOTD; 19.  Check in May. SCP started.  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) 2018   Cushing's syndrome, secondary to #1 (300 mcg/24 hr); Rx hydrocortisone 20 + 10 post op   Post operative defect right hemidiaphragm with ?worsening right effussion, not present preop   Currently on mitotane, 500 mg, BID x 1 week + hydrocortisone     MTHFR mutation (H)    MHTFR mutation with h/o mutiple miscarriages.    PAST SURGICAL HISTORY:   Past Surgical History:   Procedure Laterality Date     ADRENALECTOMY Right 2018    Procedure: ADRENALECTOMY;  Right Adrenalectomy,  Embolize Liver , Anesthesia Block;  Surgeon: Warren Mansfield MD;  Location: [facility] OR     ADRENALECTOMY        SECTION      twice      SECTION      2     EMBOLECTOMY ABDOMEN N/A 2018    Procedure: EMBOLECTOMY ABDOMEN;;  Surgeon: Ector Mcintyre MD;  Location: [facility] OR     EXTRACORPOREAL SHOCK WAVE LITHOTRIPSY (ESWL)       GASTRIC BYPASS        SOCIAL HISTORY:   Social History     Tobacco Use     Smoking status: Never Smoker     Smokeless tobacco: Never Used   Substance Use Topics     Alcohol use: Yes     Comment: occ.     Drug use: No     FAMILY HISTORY:   Family History   Problem Relation Age of Onset     Depression Paternal Grandmother      ALLERGIES:   Allergies   Allergen Reactions     Bee Venom  Swelling     Ibuprofen Hives and Swelling     CURRENT MEDICATIONS:   Current Outpatient Medications:      acetaminophen (TYLENOL) 325 MG tablet, Take 2 tablets (650 mg) by mouth every 4 hours as needed for other (multimodal surgical pain management along with NSAIDS and opioid medication as indicated based on pain control and physical function.), Disp: 150 tablet, Rfl: 0     buPROPion (WELLBUTRIN) 100 MG tablet, TAKE 1 TABLET (100 MG TOTAL) BY MOUTH 2 (TWO) TIMES A DAY., Disp: , Rfl: 3     calcium carbonate (OS-SHASHA) 500 MG tablet, Take 1 tablet (500 mg) by mouth 2 times daily, Disp: 180 tablet, Rfl: 3     cholecalciferol (VITAMIN D-1000 MAX ST) 1000 units TABS, Take 2,000 Units by mouth every morning , Disp: , Rfl:      diazepam (VALIUM) 5 MG tablet, Take 1 tablet (5 mg) by mouth once as needed for anxiety (MRI claustrophobia management), Disp: 2 tablet, Rfl: 0     EPINEPHrine (EPIPEN/ADRENACLICK/OR ANY BX GENERIC EQUIV) 0.3 MG/0.3ML injection 2-pack, As directed for bee stings, Disp: , Rfl:      ferrous fumarate 65 mg, [place]. FE,-Vitamin C 125 mg (VITRON-C)  MG TABS tablet, Take 1 tablet by mouth 3 times daily, Disp: 270 tablet, Rfl: 1     fludrocortisone (FLORINEF) 0.1 MG tablet, Take 1 tablet (0.1 mg) by mouth daily, Disp: 90 tablet, Rfl: 3     FLUoxetine (PROZAC) 40 MG capsule, Take 60 mg by mouth daily , Disp: , Rfl:      gabapentin (NEURONTIN) 300 MG capsule, Take 300 mg by mouth 3 times daily, Disp: , Rfl:      hydrocortisone (CORTEF) 10 MG tablet, Take 3 tablets (30 MG) every morning and take 2 tablets (20 MG) by mouth every day at 2 PM. Additional as directed., Disp: 500 tablet, Rfl: 3     levothyroxine (SYNTHROID/LEVOTHROID) 125 MCG tablet, Take 1 tablet (125 mcg) by mouth daily, Disp: 90 tablet, Rfl: 3     mitotane (LYSODREN) 500 MG tablet, Take 2000 mg (four 500 mg tabs) qam and 1500 mg (three 500 mg tabs) qpm (for 3500 mg total daily dose)., Disp: 210 tablet, Rfl: 0     Multiple Vitamins-Calcium  (ONE-A-DAY WOMENS PO), Take 2 tablets by mouth every morning , Disp: , Rfl:      ondansetron (ZOFRAN) 8 MG tablet, Take 1 tablet (8 mg) by mouth every 8 hours as needed for nausea, Disp: 30 tablet, Rfl: 0     prochlorperazine (COMPAZINE) 5 MG tablet, Take 1 tablet (5 mg) by mouth every 8 hours as needed for nausea or vomiting, Disp: 90 tablet, Rfl: 3     traZODone (DESYREL) 50 MG tablet, Take 2 tablets (100 mg) by mouth nightly as needed for sleep, Disp: 60 tablet, Rfl: 0     UNABLE TO FIND, medical cannabis (Patient's own supply. Not a prescription), Disp: , Rfl:      vitamin B1 (THIAMINE) 50 MG tablet, Take 1 tablet (50 mg) by mouth daily, Disp: 90 tablet, Rfl: 1    PHYSICAL EXAMINATION:  Vital signs: /80   Pulse 94   Temp 98.6  F (37  C) (Oral)   Resp 16   Wt 71.2 kg (157 lb)   SpO2 99%   BMI 26.94 kg/m    ECOG performance status of 1.   GENERAL: Young lady, sitting in chair. Appears tremulous, but in no acute distress.  HEENT: No icterus, no pallor. MMM, PERRL, OP clear.   NECK: Supple, no JVD/LAD.  LUNGS: CTAB, normal WOB on RA.  CARDIOVASCULAR: Regular rate and rhythm, no murmurs.   ABDOMEN: Soft, NT, ND, no masses appreciated, normal BS.   EXTREMITIES: No cyanosis, no clubbing, no edema.   NEUROLOGIC: Alert and fully oriented. Hands and feet are tremulous.   PSYCH: Affect flat, mood slightly anxious/depressed    LABORATORY DATA:   Lab Test 10/28/20  1532 09/16/20  1437 08/05/20  0736   WBC 10.1 5.6 4.3   RBC 4.13 4.49 4.61   HGB 13.3 14.5 14.5   HCT 40.8 43.4 44.6   MCV 99 97 97   MCH 32.2 32.3 31.5   MCHC 32.6 33.4 32.5   RDW 13.3 13.6 14.3   * 197 142*   NEUTROPHIL 84.3 80.6 54.6    140 141   POTASSIUM 3.6 3.4 3.2*   CHLORIDE 110* 107 108   CO2 26 26 24   ANIONGAP 5 7 8   GLC 85 90 102*   BUN 9 10 10   CR 0.67 0.76 0.88   SHASHA 8.1* 7.8* 8.1*   PROTTOTAL 5.7* 6.0* 6.0*   ALBUMIN 3.0* 3.0* 3.0*   BILITOTAL 0.3 0.3 0.3   ALKPHOS 73 85 82   AST 18 22 16   ALT 20 27 21     Lab Test  10/28/20  1532 09/16/20  1437 08/05/20  0736 07/16/20  0723 06/08/20  1352   TSH 0.20* 0.63 0.75 0.72 0.22*   T4 1.06 1.02 0.96 0.85 1.06     Lab Test 09/16/20  1437 08/05/20  0736   CHOL 175 188    114   LDL 38 51   TRIG 145 118     DHEAS was 740 on 6/5/18, and has been <15 on 7/12/18, 8/20/18, 9/19/18, 10/23/18, 11/27/18, 1/9/19, 5/8/19, 6/18/19, 7/17/19, 11/15/19, 12/12/19, 1/22/20, 3/30/20 and 6/8/20, 7/21/20, 8/5/20.  Mitotane level (target 14-20): 1.8ng/dl on 8/20/18, 3.5 on 10/25/18, 6.2 on 12/4/18, 8.1 on 1/9/19 and 10.2 on 2/11/19, 14.2 on 6/18/19. 10.8 on 12/12/19.  10 on 1/24/20. 8.9 on 5/14/20. 8.6 on 7/21/20, 8.3 on 8/5/20.   Labs from [facility] 9/24/19 - WBC 4200, ANC 2600, Hb 10.4, Platelets 218K, lytes WNL, creat 0.61, Calcium 8, albumin 3.5, LFTs normal, mitotane level 18.8, cortisol 25.8, ACTH<5, aldosterone 4.9, free T4 1.17, TSH 0.07, DHEAS <15, renin plasma 10.6.     ASSESSMENT AND PLAN: A 35-year-old lady with right-sided functioning adrenocortical carcinoma.     Adrenocortical carcinoma:  - Started on adjuvant mitotane in July 2018 based on her presentation and tumor characteristics including invasion of the adrenal capsule, high mitotic rate, possibly positive margins, and high Ki67, which could result in a rate of recurrence as high as 40%.    - Restaging CT C/A/P from 9/14/20 showed no evidence of disease recurrence.  - She's > 2 years out from surgery and explained that this is an excellent finding as recurrence rates drop quite a bit after year 2.  - Despite multiple supportive care interventions, patient has NOT been able to tolerate higher doses (above 3gm/day) of mitotane. In my opinion, she has an increase in AE burden with 1500+2000 regimen that she's currently on but she is insistent on trying 2000mg BID. This is also what Dr. Hilton's clinic is recommending despite their awareness of the side effects from prior attempts at higher dose.  - Will start mitotane 2000mg BID today  with very clear understanding that she needs to seek immediate medical attn for any of the long-list of side effects that can and have occurred with mitotane.   - Will follow-up on mitotane levels from today. Advised to monitor closely.   - Every 6 weekly mitotane level, CBC, comprehensive panel, TSH, FT4, DHEAS, ACTH, periodic cholesterol panel, and 24-hour Urine Free Cortisol due to COVID pandemic.  - Plan to continue adjuvant Mitotane for up to 5 yrs if tolerated.  - Next restaging CT C/A/P in ~Dec 2020/2021.     Headache, slurred speech, abnormal MRI brain:  - Likely due to mitotane but MRI brain with contrast had abnormal basal ganglia signal.   - Refer to Neurology for eval of any alternative etiologies. Monitor for new/worsening symptoms.    Hot flashes:  - Checked estradiol and FSH in Sept - premenopausal.     Endocrinopathies:  - Mgmt per endocrine team at the . Follow-up planned in the next couple months.    Insomnia:  - Trazodone is working well but she's taking 150mg at bedtime. Tried to come off but failed. Wants to try another taper - start at 125mg at bedtime today and decrease by 25mg every 4 weeks. Rx sent.   - EKG with QTc 467 ms in 2020.      Return to see me in 6 weeks.     BILLIN    Benson Cintron M.D.  . Professor of Medicine  Genitourinary Oncology  Division of Hematology, Oncology & Transplantation  [facility]

## 2020-10-28 NOTE — LETTER
"    10/28/2020         RE: Suzy Rodríguez  [address]  [facility] [id number]        Dear Colleague,    Thank you for referring your patient, Suzy Rodríguez, to the [facility]. Please see a copy of my visit note below.    MEDICAL ONCOLOGY CLINIC NOTE    REFERRING PROVIDER: Warren Mansfield MD from [facility] clinic.      REASON FOR CURRENT VISIT: Follow-up for adrenocortical carcinoma, currently on adjuvant mitotane.    HISTORY OF PRESENT ILLNESS: Ms. 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     Suzy increased mitotane dose to 1500mg PO BID since 8/5/20 visit and then to 1500 AM+2000 PM in mid Sept 2020. Reports hot flashes (several times a day), previously anorexia, gaining weight, nausea, headaches every day, bloating x 1 week. Fatigue moderate, no slurred speech but does have \"loss of words\". Also has involuntary tremors similar to what she had with the higher doses in the past. Has also come off clonazepam but anxiety significant. Also has significant insomnia. Saw Dr. Huertas in Oct 2020 and recommended to increase mitotane to 2000mg BID and continue hydrocortisone 30mg QAM, 20mg early PM. Also advised to increase fluodrocortisone to 0.1mg/day and levothyroxine to 125 mcg/day and then 2 tablets on Sunday. She also follows with Dr. Veena Jaquez in our endocrinology department.     No more nausea with mitotane so she stopped taking compazine. Denies any fever, diarrhea, dizziness, wt loss. No clinical signs/symptoms of recurrence although she has non-specific MSK RU chest pain and SANCHEZ.    ONCOLOGIC HISTORY:  1. 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 " "  - CT abdomen and pelvis stone protocol without contrast 5 2338 showed \"9.2 x 8 cm heterogeneous right upper quadrant mass with small foci of calcification within it which is likely arising from the adrenal gland though inseparable from liver and upper pole of right kidney. It is incompletely evaluated on this noncontrast study. 3 x 2.6 cm mass right lobe of liver on image #85 also incompletely evaluated. 6 x 4 x 4 mm upper third left ureteral obstructing stone with mild to moderate secondary hydronephrosis. Collection of stones at lower pole left kidney extending over an area of approximately 6 x 7 x 4 mm. Additional nonobstructing 1 mm stone upper pole left kidney. 2 mm nonobstructing stone noted upper pole right kidney with no hydronephrosis on the right. Postop change consistent with gastric bypass. Noncontrast images of spleen, left adrenal gland, gallbladder, and pancreas unremarkable. No aneurysm. No adenopathy.\"  - Seen by Dr. Delvalle at [facility] clinic. Referred to Dr. Alvino Patel and then Dr. Warren Mansfield.  - Endocrinology team directed workup showed high DHEAS level of 740 pre-surgery.   - Right open adrenalectomy and hepatic mobilization performed by Dr. Warren Mansfield on 6/25/2018. Pathology showed adrenocortical carcinoma measuring 11.3 cm, weighing 413 g with extension into or through the adrenal capsule negative lymphovascular invasion, tumor necrosis present, margins involved by tumor, no regional lymph nodes identified, pathologic stage T2 NX.  pathology review reveals low grade ACC.  - DHEAS normalized postsurgery.   - Adjuvant Mitotane started on 7/24/18. Dose increased to 2000mg TID around 10/23/18 due to persistently low troughs. Held 1/16/19 for two weeks and resumed 1/30 at 1000 mg po BID due to very poor tolerance of higher doses. Highest mitotane level was 10.2 on 2/11/19. Mitotane troughs did not rise above 10 despite increase in dose to 1500 BID and then " 1500+2000. Started 2000mg BID on 10/28/20.   - Saw radiation oncology at [facility], radiation oncology at [facility], as well as endocrine oncology (Dr. Huertas) at [facility].   - S/p adjuvant RT by Dr. Monsivais - 5040 cGy over 30 fr (18-10/6/18).  - 19: OSH CT C/A/P and MRI brain with contrast - no evidence of disease recurrence.   - 20, 20: CT C/A/P with contrast - AFUA.    REVIEW OF SYSTEMS: 14 point ROS negative other than the symptoms noted above in the HPI.    PAST MEDICAL HISTORY:  Past Medical History:   Diagnosis Date     Adrenal cortex cancer, right (H) 2018    Resected 18, Dr. Mansfield.     Adrenal mass (H)      Chocolate cyst of ovary      History of miscarriage      Malignant neoplasm of cortex of right adrenal gland (H) 2018    EOTD; 19.  Check in May. SCP started.  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) 2018   Cushing's syndrome, secondary to #1 (300 mcg/24 hr); Rx hydrocortisone 20 + 10 post op   Post operative defect right hemidiaphragm with ?worsening right effussion, not present preop   Currently on mitotane, 500 mg, BID x 1 week + hydrocortisone     MTHFR mutation (H)    MHTFR mutation with h/o mutiple miscarriages.    PAST SURGICAL HISTORY:   Past Surgical History:   Procedure Laterality Date     ADRENALECTOMY Right 2018    Procedure: ADRENALECTOMY;  Right Adrenalectomy,  Embolize Liver , Anesthesia Block;  Surgeon: Warren Mansfield MD;  Location: [facility] OR     ADRENALECTOMY        SECTION      twice      SECTION      2     EMBOLECTOMY ABDOMEN N/A 2018    Procedure: EMBOLECTOMY ABDOMEN;;  Surgeon: Ector Mcintyre MD;  Location: [facility] OR     EXTRACORPOREAL SHOCK WAVE LITHOTRIPSY (ESWL)       GASTRIC BYPASS        SOCIAL HISTORY:   Social History     Tobacco Use     Smoking status: Never Smoker     Smokeless tobacco:  Never Used   Substance Use Topics     Alcohol use: Yes     Comment: occ.     Drug use: No     FAMILY HISTORY:   Family History   Problem Relation Age of Onset     Depression Paternal Grandmother      ALLERGIES:   Allergies   Allergen Reactions     Bee Venom Swelling     Ibuprofen Hives and Swelling     CURRENT MEDICATIONS:   Current Outpatient Medications:      acetaminophen (TYLENOL) 325 MG tablet, Take 2 tablets (650 mg) by mouth every 4 hours as needed for other (multimodal surgical pain management along with NSAIDS and opioid medication as indicated based on pain control and physical function.), Disp: 150 tablet, Rfl: 0     buPROPion (WELLBUTRIN) 100 MG tablet, TAKE 1 TABLET (100 MG TOTAL) BY MOUTH 2 (TWO) TIMES A DAY., Disp: , Rfl: 3     calcium carbonate (OS-SHASHA) 500 MG tablet, Take 1 tablet (500 mg) by mouth 2 times daily, Disp: 180 tablet, Rfl: 3     cholecalciferol (VITAMIN D-1000 MAX ST) 1000 units TABS, Take 2,000 Units by mouth every morning , Disp: , Rfl:      diazepam (VALIUM) 5 MG tablet, Take 1 tablet (5 mg) by mouth once as needed for anxiety (MRI claustrophobia management), Disp: 2 tablet, Rfl: 0     EPINEPHrine (EPIPEN/ADRENACLICK/OR ANY BX GENERIC EQUIV) 0.3 MG/0.3ML injection 2-pack, As directed for bee stings, Disp: , Rfl:      ferrous fumarate 65 mg, [place]. FE,-Vitamin C 125 mg (VITRON-C)  MG TABS tablet, Take 1 tablet by mouth 3 times daily, Disp: 270 tablet, Rfl: 1     fludrocortisone (FLORINEF) 0.1 MG tablet, Take 1 tablet (0.1 mg) by mouth daily, Disp: 90 tablet, Rfl: 3     FLUoxetine (PROZAC) 40 MG capsule, Take 60 mg by mouth daily , Disp: , Rfl:      gabapentin (NEURONTIN) 300 MG capsule, Take 300 mg by mouth 3 times daily, Disp: , Rfl:      hydrocortisone (CORTEF) 10 MG tablet, Take 3 tablets (30 MG) every morning and take 2 tablets (20 MG) by mouth every day at 2 PM. Additional as directed., Disp: 500 tablet, Rfl: 3     levothyroxine (SYNTHROID/LEVOTHROID) 125 MCG tablet, Take 1  tablet (125 mcg) by mouth daily, Disp: 90 tablet, Rfl: 3     mitotane (LYSODREN) 500 MG tablet, Take 2000 mg (four 500 mg tabs) qam and 1500 mg (three 500 mg tabs) qpm (for 3500 mg total daily dose)., Disp: 210 tablet, Rfl: 0     Multiple Vitamins-Calcium (ONE-A-DAY WOMENS PO), Take 2 tablets by mouth every morning , Disp: , Rfl:      ondansetron (ZOFRAN) 8 MG tablet, Take 1 tablet (8 mg) by mouth every 8 hours as needed for nausea, Disp: 30 tablet, Rfl: 0     prochlorperazine (COMPAZINE) 5 MG tablet, Take 1 tablet (5 mg) by mouth every 8 hours as needed for nausea or vomiting, Disp: 90 tablet, Rfl: 3     traZODone (DESYREL) 50 MG tablet, Take 2 tablets (100 mg) by mouth nightly as needed for sleep, Disp: 60 tablet, Rfl: 0     UNABLE TO FIND, medical cannabis (Patient's own supply. Not a prescription), Disp: , Rfl:      vitamin B1 (THIAMINE) 50 MG tablet, Take 1 tablet (50 mg) by mouth daily, Disp: 90 tablet, Rfl: 1    PHYSICAL EXAMINATION:  Vital signs: /80   Pulse 94   Temp 98.6  F (37  C) (Oral)   Resp 16   Wt 71.2 kg (157 lb)   SpO2 99%   BMI 26.94 kg/m    ECOG performance status of 1.   GENERAL: Young lady, sitting in chair. Appears tremulous, but in no acute distress.  HEENT: No icterus, no pallor. MMM, PERRL, OP clear.   NECK: Supple, no JVD/LAD.  LUNGS: CTAB, normal WOB on RA.  CARDIOVASCULAR: Regular rate and rhythm, no murmurs.   ABDOMEN: Soft, NT, ND, no masses appreciated, normal BS.   EXTREMITIES: No cyanosis, no clubbing, no edema.   NEUROLOGIC: Alert and fully oriented. Hands and feet are tremulous.   PSYCH: Affect flat, mood slightly anxious/depressed    LABORATORY DATA:   Lab Test 10/28/20  1532 09/16/20  1437 08/05/20  0736   WBC 10.1 5.6 4.3   RBC 4.13 4.49 4.61   HGB 13.3 14.5 14.5   HCT 40.8 43.4 44.6   MCV 99 97 97   MCH 32.2 32.3 31.5   MCHC 32.6 33.4 32.5   RDW 13.3 13.6 14.3   * 197 142*   NEUTROPHIL 84.3 80.6 54.6    140 141   POTASSIUM 3.6 3.4 3.2*   CHLORIDE 110*  107 108   CO2 26 26 24   ANIONGAP 5 7 8   GLC 85 90 102*   BUN 9 10 10   CR 0.67 0.76 0.88   SHASHA 8.1* 7.8* 8.1*   PROTTOTAL 5.7* 6.0* 6.0*   ALBUMIN 3.0* 3.0* 3.0*   BILITOTAL 0.3 0.3 0.3   ALKPHOS 73 85 82   AST 18 22 16   ALT 20 27 21     Lab Test 10/28/20  1532 09/16/20  1437 08/05/20  0736 07/16/20  0723 06/08/20  1352   TSH 0.20* 0.63 0.75 0.72 0.22*   T4 1.06 1.02 0.96 0.85 1.06     Lab Test 09/16/20  1437 08/05/20  0736   CHOL 175 188    114   LDL 38 51   TRIG 145 118     DHEAS was 740 on 6/5/18, and has been <15 on 7/12/18, 8/20/18, 9/19/18, 10/23/18, 11/27/18, 1/9/19, 5/8/19, 6/18/19, 7/17/19, 11/15/19, 12/12/19, 1/22/20, 3/30/20 and 6/8/20, 7/21/20, 8/5/20.  Mitotane level (target 14-20): 1.8ng/dl on 8/20/18, 3.5 on 10/25/18, 6.2 on 12/4/18, 8.1 on 1/9/19 and 10.2 on 2/11/19, 14.2 on 6/18/19. 10.8 on 12/12/19.  10 on 1/24/20. 8.9 on 5/14/20. 8.6 on 7/21/20, 8.3 on 8/5/20.   Labs from [facility] 9/24/19 - WBC 4200, ANC 2600, Hb 10.4, Platelets 218K, lytes WNL, creat 0.61, Calcium 8, albumin 3.5, LFTs normal, mitotane level 18.8, cortisol 25.8, ACTH<5, aldosterone 4.9, free T4 1.17, TSH 0.07, DHEAS <15, renin plasma 10.6.     ASSESSMENT AND PLAN: A 35-year-old lady with right-sided functioning adrenocortical carcinoma.     Adrenocortical carcinoma:  - Started on adjuvant mitotane in July 2018 based on her presentation and tumor characteristics including invasion of the adrenal capsule, high mitotic rate, possibly positive margins, and high Ki67, which could result in a rate of recurrence as high as 40%.    - Restaging CT C/A/P from 9/14/20 showed no evidence of disease recurrence.  - She's > 2 years out from surgery and explained that this is an excellent finding as recurrence rates drop quite a bit after year 2.  - Despite multiple supportive care interventions, patient has NOT been able to tolerate higher doses (above 3gm/day) of mitotane. In my opinion, she has an increase in AE burden with 1500+2000  regimen that she's currently on but she is insistent on trying 2000mg BID. This is also what Dr. Hilton's clinic is recommending despite their awareness of the side effects from prior attempts at higher dose.  - Will start mitotane 2000mg BID today with very clear understanding that she needs to seek immediate medical attn for any of the long-list of side effects that can and have occurred with mitotane.   - Will follow-up on mitotane levels from today. Advised to monitor closely.   - Every 6 weekly mitotane level, CBC, comprehensive panel, TSH, FT4, DHEAS, ACTH, periodic cholesterol panel, and 24-hour Urine Free Cortisol due to COVID pandemic.  - Plan to continue adjuvant Mitotane for up to 5 yrs if tolerated.  - Next restaging CT C/A/P in ~Dec 2020/2021.     Headache, slurred speech, abnormal MRI brain:  - Likely due to mitotane but MRI brain with contrast had abnormal basal ganglia signal.   - Refer to Neurology for eval of any alternative etiologies. Monitor for new/worsening symptoms.    Hot flashes:  - Checked estradiol and FSH in Sept - premenopausal.     Endocrinopathies:  - Mgmt per endocrine team at the . Follow-up planned in the next couple months.    Insomnia:  - Trazodone is working well but she's taking 150mg at bedtime. Tried to come off but failed. Wants to try another taper - start at 125mg at bedtime today and decrease by 25mg every 4 weeks. Rx sent.   - EKG with QTc 467 ms in 2020.      Return to see me in 6 weeks.     BILLIN    Benson Cintron M.D.  . Professor of Medicine  Genitourinary Oncology  Division of Hematology, Oncology & Transplantation  [facility]      Chief Complaint   Patient presents with     Blood Draw     Vitals and blood drawn via VPT by LPN. Pt checked into [facility].      KANDICE Meza LPN      Again, thank you for allowing me to participate in the care of your patient.        Sincerely,        Benson Cintron MD

## 2020-10-29 DIAGNOSIS — C74.01 MALIGNANT NEOPLASM OF CORTEX OF RIGHT ADRENAL GLAND (H): ICD-10-CM

## 2020-10-29 LAB
DHEA-S SERPL-MCNC: <15 UG/DL (ref 35–430)
MISCELLANEOUS TEST: NORMAL

## 2020-10-29 PROCEDURE — 99000 SPECIMEN HANDLING OFFICE-LAB: CPT | Performed by: INTERNAL MEDICINE

## 2020-10-29 PROCEDURE — 82530 CORTISOL FREE: CPT | Mod: 90 | Performed by: INTERNAL MEDICINE

## 2020-10-29 PROCEDURE — 82024 ASSAY OF ACTH: CPT | Performed by: INTERNAL MEDICINE

## 2020-10-30 LAB — ACTH PLAS-MCNC: <10 PG/ML

## 2020-11-01 ENCOUNTER — COMMUNICATION - HEALTHEAST (OUTPATIENT)
Dept: BEHAVIORAL HEALTH | Facility: CLINIC | Age: 35
End: 2020-11-01

## 2020-11-01 DIAGNOSIS — F41.1 GENERALIZED ANXIETY DISORDER: ICD-10-CM

## 2020-11-01 DIAGNOSIS — F33.9 MAJOR DEPRESSIVE DISORDER, RECURRENT EPISODE (H): ICD-10-CM

## 2020-11-03 LAB
COLLECT DURATION TIME SPEC: 1 H
CORTIS F 24H UR HPLC-MCNC: 28.4 UG/L
CORTIS F 24H UR-MRATE: NORMAL UG/D
CORTIS F/CREAT 24H UR: 74.74 UG/G CRT
CREAT 24H UR-MRATE: NORMAL MG/D (ref 700–1600)
CREAT UR-MCNC: 38 MG/DL
IMP & REVIEW OF LAB RESULTS: NORMAL
SPECIMEN VOL ?TM UR: 10 ML

## 2020-11-04 ENCOUNTER — OFFICE VISIT - HEALTHEAST (OUTPATIENT)
Dept: FAMILY MEDICINE | Facility: CLINIC | Age: 35
End: 2020-11-04

## 2020-11-04 DIAGNOSIS — F41.1 GENERALIZED ANXIETY DISORDER: ICD-10-CM

## 2020-11-04 DIAGNOSIS — C74.01 MALIGNANT NEOPLASM OF CORTEX OF RIGHT ADRENAL GLAND (H): Primary | ICD-10-CM

## 2020-11-04 DIAGNOSIS — R52 PAIN: ICD-10-CM

## 2020-11-04 DIAGNOSIS — F33.9 MAJOR DEPRESSIVE DISORDER, RECURRENT EPISODE (H): ICD-10-CM

## 2020-11-04 ASSESSMENT — ANXIETY QUESTIONNAIRES
IF YOU CHECKED OFF ANY PROBLEMS ON THIS QUESTIONNAIRE, HOW DIFFICULT HAVE THESE PROBLEMS MADE IT FOR YOU TO DO YOUR WORK, TAKE CARE OF THINGS AT HOME, OR GET ALONG WITH OTHER PEOPLE: VERY DIFFICULT
7. FEELING AFRAID AS IF SOMETHING AWFUL MIGHT HAPPEN: NEARLY EVERY DAY
6. BECOMING EASILY ANNOYED OR IRRITABLE: NEARLY EVERY DAY
5. BEING SO RESTLESS THAT IT IS HARD TO SIT STILL: NEARLY EVERY DAY
4. TROUBLE RELAXING: MORE THAN HALF THE DAYS
3. WORRYING TOO MUCH ABOUT DIFFERENT THINGS: NOT AT ALL
GAD7 TOTAL SCORE: 16
1. FEELING NERVOUS, ANXIOUS, OR ON EDGE: NEARLY EVERY DAY
2. NOT BEING ABLE TO STOP OR CONTROL WORRYING: MORE THAN HALF THE DAYS

## 2020-11-04 ASSESSMENT — PATIENT HEALTH QUESTIONNAIRE - PHQ9: SUM OF ALL RESPONSES TO PHQ QUESTIONS 1-9: 23

## 2020-11-04 ASSESSMENT — MIFFLIN-ST. JEOR: SCORE: 1378.64

## 2020-11-09 ENCOUNTER — MYC MEDICAL ADVICE (OUTPATIENT)
Dept: ONCOLOGY | Facility: CLINIC | Age: 35
End: 2020-11-09

## 2020-11-09 DIAGNOSIS — C74.01 MALIGNANT NEOPLASM OF CORTEX OF RIGHT ADRENAL GLAND (H): ICD-10-CM

## 2020-11-12 LAB — LAB SCANNED RESULT: NORMAL

## 2020-11-13 PROCEDURE — 82542 COL CHROMOTOGRAPHY QUAL/QUAN: CPT | Mod: 90 | Performed by: INTERNAL MEDICINE

## 2020-11-13 PROCEDURE — 99000 SPECIMEN HANDLING OFFICE-LAB: CPT | Performed by: INTERNAL MEDICINE

## 2020-11-13 PROCEDURE — 82530 CORTISOL FREE: CPT | Mod: 90 | Performed by: INTERNAL MEDICINE

## 2020-11-17 ENCOUNTER — OFFICE VISIT - HEALTHEAST (OUTPATIENT)
Dept: FAMILY MEDICINE | Facility: CLINIC | Age: 35
End: 2020-11-17

## 2020-11-17 DIAGNOSIS — J02.9 SORE THROAT: ICD-10-CM

## 2020-11-17 DIAGNOSIS — C74.01 MALIGNANT NEOPLASM OF CORTEX OF RIGHT ADRENAL GLAND (H): ICD-10-CM

## 2020-11-17 DIAGNOSIS — R51.9 ACUTE NONINTRACTABLE HEADACHE, UNSPECIFIED HEADACHE TYPE: ICD-10-CM

## 2020-11-17 DIAGNOSIS — R52 GENERALIZED BODY ACHES: ICD-10-CM

## 2020-11-22 ENCOUNTER — DOCUMENTATION ONLY (OUTPATIENT)
Dept: ONCOLOGY | Facility: CLINIC | Age: 35
End: 2020-11-22

## 2020-11-23 ENCOUNTER — TELEPHONE (OUTPATIENT)
Dept: PHARMACY | Facility: CLINIC | Age: 35
End: 2020-11-23

## 2020-11-23 NOTE — ORAL ONC MGMT
Oral Chemotherapy Monitoring Program     Placed call to patient to follow up on mitotane dosing and coordinate refills.  Per Dr. Cintron's most recent progress note patient's dosing was increased to 2000mg BID.      Patient reports that she had increased her mitotane dosing under direction of Dr. Huertas to 2000mg AM, 1000mg Afternoon, and 2000mg PM.  I will send Dr. Cintron and [employer] message to update him on this dosing change.    Fito May, PharmD.  Oral Chemotherapy Monitoring Program  [phone]

## 2020-11-25 LAB
COLLECT DURATION TIME UR: 24 H
CORTIS 24H UR-MRATE: 90 MCG/24 H (ref 3.5–45)
CORTISONE 24H UR-MRATE: 107 MCG/24 H (ref 17–129)
SPECIMEN VOL 24H UR: 2375 ML

## 2020-11-29 ENCOUNTER — HEALTH MAINTENANCE LETTER (OUTPATIENT)
Age: 35
End: 2020-11-29

## 2020-11-30 DIAGNOSIS — C74.01 MALIGNANT NEOPLASM OF CORTEX OF RIGHT ADRENAL GLAND (H): Primary | ICD-10-CM

## 2020-12-03 DIAGNOSIS — Z98.84 GASTRIC BYPASS STATUS FOR OBESITY: ICD-10-CM

## 2020-12-03 DIAGNOSIS — E27.40 ADRENAL INSUFFICIENCY (H): ICD-10-CM

## 2020-12-03 DIAGNOSIS — C74.01 ADRENAL CORTEX CANCER, RIGHT (H): ICD-10-CM

## 2020-12-06 RX ORDER — CALCIUM CARBONATE 500(1250)
1 TABLET ORAL 2 TIMES DAILY
Qty: 180 TABLET | Refills: 3 | Status: SHIPPED | OUTPATIENT
Start: 2020-12-06 | End: 2021-09-03

## 2020-12-06 RX ORDER — FLUDROCORTISONE ACETATE 0.1 MG/1
0.1 TABLET ORAL DAILY
Qty: 90 TABLET | Refills: 1 | Status: SHIPPED | OUTPATIENT
Start: 2020-12-06 | End: 2021-04-13

## 2020-12-06 NOTE — TELEPHONE ENCOUNTER
fludrocortisone (FLORINEF) 0.1 MG tablet     Last Written Prescription Date:  12/11/19  Last Fill Quantity: 90  # refills: 3  Last Office Visit : 6/22/20  Future Office visit: none     calcium carbonate (OS-SHASHA) 500 MG tablet  Last Written Prescription Date:  12/16/19  Last Fill Quantity: 180,   # refills: 3    Routing refill request to provider for review/approval because: calcium not on protocol

## 2020-12-07 ENCOUNTER — TELEPHONE (OUTPATIENT)
Dept: ONCOLOGY | Facility: CLINIC | Age: 35
End: 2020-12-07

## 2020-12-07 ENCOUNTER — ANCILLARY PROCEDURE (OUTPATIENT)
Dept: CT IMAGING | Facility: CLINIC | Age: 35
End: 2020-12-07
Attending: INTERNAL MEDICINE
Payer: COMMERCIAL

## 2020-12-07 DIAGNOSIS — C74.01 MALIGNANT NEOPLASM OF CORTEX OF RIGHT ADRENAL GLAND (H): ICD-10-CM

## 2020-12-07 PROCEDURE — 71260 CT THORAX DX C+: CPT | Performed by: STUDENT IN AN ORGANIZED HEALTH CARE EDUCATION/TRAINING PROGRAM

## 2020-12-07 PROCEDURE — 74177 CT ABD & PELVIS W/CONTRAST: CPT | Performed by: STUDENT IN AN ORGANIZED HEALTH CARE EDUCATION/TRAINING PROGRAM

## 2020-12-07 RX ORDER — IOPAMIDOL 755 MG/ML
96 INJECTION, SOLUTION INTRAVASCULAR ONCE
Status: COMPLETED | OUTPATIENT
Start: 2020-12-07 | End: 2020-12-07

## 2020-12-07 RX ADMIN — IOPAMIDOL 96 ML: 755 INJECTION, SOLUTION INTRAVASCULAR at 11:03

## 2020-12-07 NOTE — TELEPHONE ENCOUNTER
PA Initiation    Medication: LYSODREN - PA SUBMITTED  Insurance Company: [employer] - Phone [phone] Fax [phone]  Pharmacy Filling the Rx:  [facility]   Start Date: 12/7/2020    [facility]   Oncology Pharmacy Liaison  [email or web address]   [phone] (phone)   [phone] (fax)

## 2020-12-09 ENCOUNTER — ONCOLOGY VISIT (OUTPATIENT)
Dept: ONCOLOGY | Facility: CLINIC | Age: 35
End: 2020-12-09
Attending: INTERNAL MEDICINE
Payer: COMMERCIAL

## 2020-12-09 ENCOUNTER — APPOINTMENT (OUTPATIENT)
Dept: LAB | Facility: CLINIC | Age: 35
End: 2020-12-09
Attending: INTERNAL MEDICINE
Payer: COMMERCIAL

## 2020-12-09 VITALS
DIASTOLIC BLOOD PRESSURE: 80 MMHG | OXYGEN SATURATION: 98 % | TEMPERATURE: 99.1 F | RESPIRATION RATE: 16 BRPM | WEIGHT: 158.7 LBS | BODY MASS INDEX: 27.23 KG/M2 | SYSTOLIC BLOOD PRESSURE: 109 MMHG | HEART RATE: 79 BPM

## 2020-12-09 DIAGNOSIS — R00.2 PALPITATIONS: Primary | ICD-10-CM

## 2020-12-09 DIAGNOSIS — C74.01 MALIGNANT NEOPLASM OF CORTEX OF RIGHT ADRENAL GLAND (H): ICD-10-CM

## 2020-12-09 LAB
ALBUMIN SERPL-MCNC: 3 G/DL (ref 3.4–5)
ALP SERPL-CCNC: 85 U/L (ref 40–150)
ALT SERPL W P-5'-P-CCNC: 22 U/L (ref 0–50)
ANION GAP SERPL CALCULATED.3IONS-SCNC: 4 MMOL/L (ref 3–14)
AST SERPL W P-5'-P-CCNC: 18 U/L (ref 0–45)
BASOPHILS # BLD AUTO: 0 10E9/L (ref 0–0.2)
BASOPHILS NFR BLD AUTO: 0.4 %
BILIRUB SERPL-MCNC: 0.2 MG/DL (ref 0.2–1.3)
BUN SERPL-MCNC: 10 MG/DL (ref 7–30)
CALCIUM SERPL-MCNC: 8 MG/DL (ref 8.5–10.1)
CHLORIDE SERPL-SCNC: 106 MMOL/L (ref 94–109)
CHOLEST SERPL-MCNC: 174 MG/DL
CO2 SERPL-SCNC: 28 MMOL/L (ref 20–32)
CREAT SERPL-MCNC: 0.72 MG/DL (ref 0.52–1.04)
DIFFERENTIAL METHOD BLD: NORMAL
EOSINOPHIL # BLD AUTO: 0 10E9/L (ref 0–0.7)
EOSINOPHIL NFR BLD AUTO: 0.3 %
ERYTHROCYTE [DISTWIDTH] IN BLOOD BY AUTOMATED COUNT: 13 % (ref 10–15)
GFR SERPL CREATININE-BSD FRML MDRD: >90 ML/MIN/{1.73_M2}
GLUCOSE SERPL-MCNC: 83 MG/DL (ref 70–99)
HCT VFR BLD AUTO: 42.4 % (ref 35–47)
HDLC SERPL-MCNC: 106 MG/DL
HGB BLD-MCNC: 14 G/DL (ref 11.7–15.7)
IMM GRANULOCYTES # BLD: 0.1 10E9/L (ref 0–0.4)
IMM GRANULOCYTES NFR BLD: 0.7 %
LDLC SERPL CALC-MCNC: 47 MG/DL
LYMPHOCYTES # BLD AUTO: 1.2 10E9/L (ref 0.8–5.3)
LYMPHOCYTES NFR BLD AUTO: 16.6 %
MCH RBC QN AUTO: 31.8 PG (ref 26.5–33)
MCHC RBC AUTO-ENTMCNC: 33 G/DL (ref 31.5–36.5)
MCV RBC AUTO: 96 FL (ref 78–100)
MONOCYTES # BLD AUTO: 0.3 10E9/L (ref 0–1.3)
MONOCYTES NFR BLD AUTO: 3.7 %
NEUTROPHILS # BLD AUTO: 5.9 10E9/L (ref 1.6–8.3)
NEUTROPHILS NFR BLD AUTO: 78.3 %
NONHDLC SERPL-MCNC: 68 MG/DL
NRBC # BLD AUTO: 0 10*3/UL
NRBC BLD AUTO-RTO: 0 /100
PLATELET # BLD AUTO: 229 10E9/L (ref 150–450)
POTASSIUM SERPL-SCNC: 4.1 MMOL/L (ref 3.4–5.3)
PROT SERPL-MCNC: 6 G/DL (ref 6.8–8.8)
RBC # BLD AUTO: 4.4 10E12/L (ref 3.8–5.2)
SODIUM SERPL-SCNC: 138 MMOL/L (ref 133–144)
T4 FREE SERPL-MCNC: 1.03 NG/DL (ref 0.76–1.46)
TRIGL SERPL-MCNC: 107 MG/DL
TSH SERPL DL<=0.005 MIU/L-ACNC: 0.25 MU/L (ref 0.4–4)
WBC # BLD AUTO: 7.5 10E9/L (ref 4–11)

## 2020-12-09 PROCEDURE — 80375 DRUG/SUBSTANCE NOS 1-3: CPT | Performed by: INTERNAL MEDICINE

## 2020-12-09 PROCEDURE — G0463 HOSPITAL OUTPT CLINIC VISIT: HCPCS | Mod: 25

## 2020-12-09 PROCEDURE — 85025 COMPLETE CBC W/AUTO DIFF WBC: CPT | Performed by: INTERNAL MEDICINE

## 2020-12-09 PROCEDURE — 80061 LIPID PANEL: CPT | Performed by: INTERNAL MEDICINE

## 2020-12-09 PROCEDURE — 99215 OFFICE O/P EST HI 40 MIN: CPT | Performed by: INTERNAL MEDICINE

## 2020-12-09 PROCEDURE — 93005 ELECTROCARDIOGRAM TRACING: CPT

## 2020-12-09 PROCEDURE — 84439 ASSAY OF FREE THYROXINE: CPT | Performed by: INTERNAL MEDICINE

## 2020-12-09 PROCEDURE — 84443 ASSAY THYROID STIM HORMONE: CPT | Performed by: INTERNAL MEDICINE

## 2020-12-09 PROCEDURE — 36415 COLL VENOUS BLD VENIPUNCTURE: CPT

## 2020-12-09 PROCEDURE — 80053 COMPREHEN METABOLIC PANEL: CPT | Performed by: INTERNAL MEDICINE

## 2020-12-09 PROCEDURE — 84999 UNLISTED CHEMISTRY PROCEDURE: CPT | Performed by: INTERNAL MEDICINE

## 2020-12-09 PROCEDURE — 82024 ASSAY OF ACTH: CPT | Performed by: INTERNAL MEDICINE

## 2020-12-09 PROCEDURE — 82627 DEHYDROEPIANDROSTERONE: CPT | Performed by: INTERNAL MEDICINE

## 2020-12-09 ASSESSMENT — PAIN SCALES - GENERAL: PAINLEVEL: NO PAIN (0)

## 2020-12-09 NOTE — NURSING NOTE
"Oncology Rooming Note    December 9, 2020 3:56 PM   Suzy Rodríguez is a 35 year old female who presents for:    Chief Complaint   Patient presents with     Blood Draw     labs drawn with vpt by rn.  vs taken     RECHECK     Malignant neoplasm of cortex of right adrenal gland (H)      Initial Vitals: /80 (BP Location: Left arm, Patient Position: Sitting, Cuff Size: Adult Regular)   Pulse 79   Temp 99.1  F (37.3  C) (Tympanic)   Resp 16   Wt 72 kg (158 lb 11.2 oz)   SpO2 98%   BMI 27.23 kg/m   Estimated body mass index is 27.23 kg/m  as calculated from the following:    Height as of 2/26/20: 1.626 m (5' 4.02\").    Weight as of this encounter: 72 kg (158 lb 11.2 oz). Body surface area is 1.8 meters squared.  No Pain (0) Comment: Data Unavailable   No LMP recorded. (Menstrual status: IUD).  Allergies reviewed: Yes  Medications reviewed: Yes    Medications: Medication refills not needed today.  Pharmacy name entered into [facility]:    [facility] - [id number]  [facility] [place] [place] - [address]    Clinical concerns: ESPERANZA Montoya CMA              "

## 2020-12-09 NOTE — LETTER
"    12/9/2020         RE: Suzy Rodríguez  [address]  [facility] [id number]        Dear Colleague,    Thank you for referring your patient, Suzy Rodríguez, to the [facility]. Please see a copy of my visit note below.    MEDICAL ONCOLOGY CLINIC NOTE    REFERRING PROVIDER: Warren Mansfield MD from [facility] Urologic Oncology clinic.      REASON FOR CURRENT VISIT: Follow-up for adrenocortical carcinoma, currently on adjuvant mitotane.    HISTORY OF PRESENT ILLNESS: Ms. 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     Suzy increased mitotane dose to [phone] a few weeks ago. Hot flashes, headache resolved but anorexia, gaining weight, nausea (using compazine 10mg QAM+QPM), bloating x 1 week. Occasional lose BM (1-2x/day) but without change in color or bleeding. Fatigue moderate, no slurred speech but does have \"loss of words\". Also has involuntary tremors similar to what she had with the higher doses in the past. Anxiety significant and she has episodes of sudden-onset palpitations, pre-syncope (without CP/SOA/neuro symptoms) which resolve within a minute or so with deep breathing and sitting down. Also has significant insomnia. Legs with long-standing dull aching pain which is helped by gabapentin 900mg TID.    Saw Dr. Huertas in Oct 2020 and continues hydrocortisone 30mg QAM, 20mg early PM; fluodrocortisone 0.1mg/day and levothyroxine to 125 mcg/day and then 2 tablets on Sunday. She also follows with Dr. Veena Jaquez in our endocrinology department.     Denies any fever, diarrhea, dizziness, wt loss. No clinical signs/symptoms of recurrence although she has non-specific MSK RU chest pain and SANCHEZ.    ONCOLOGIC HISTORY:  1. Right adrenocortical carcinoma, localized, high-risk (Ki67 20%; adrenal capsular invasion present, mitotic " "rate 15 per 50 HPF):  - Presented to emergency room on 5/8/2018 with acute onset left flank pain.   - CT abdomen and pelvis stone protocol without contrast 5 8720 showed \"9.2 x 8 cm heterogeneous right upper quadrant mass with small foci of calcification within it which is likely arising from the adrenal gland though inseparable from liver and upper pole of right kidney. It is incompletely evaluated on this noncontrast study. 3 x 2.6 cm mass right lobe of liver on image #85 also incompletely evaluated. 6 x 4 x 4 mm upper third left ureteral obstructing stone with mild to moderate secondary hydronephrosis. Collection of stones at lower pole left kidney extending over an area of approximately 6 x 7 x 4 mm. Additional nonobstructing 1 mm stone upper pole left kidney. 2 mm nonobstructing stone noted upper pole right kidney with no hydronephrosis on the right. Postop change consistent with gastric bypass. Noncontrast images of spleen, left adrenal gland, gallbladder, and pancreas unremarkable. No aneurysm. No adenopathy.\"  - Seen by Dr. Delvalle at [facility] Urology clinic. Referred to Dr. Alvino Patel and then Dr. Warren Mansfield.  - Endocrinology team directed workup showed high DHEAS level of 740 pre-surgery.   - Right open adrenalectomy and hepatic mobilization performed by Dr. Warren Mansfield on 6/25/2018. Pathology showed adrenocortical carcinoma measuring 11.3 cm, weighing 413 g with extension into or through the adrenal capsule negative lymphovascular invasion, tumor necrosis present, margins involved by tumor, no regional lymph nodes identified, pathologic stage T2 NX.  pathology review reveals low grade ACC.  - DHEAS normalized postsurgery.   - Adjuvant Mitotane started on 7/24/18. Dose increased to 2000mg TID around 10/23/18 due to persistently low troughs. Held 1/16/19 for two weeks and resumed 1/30 at 1000 mg po BID due to very poor tolerance of higher doses. Highest mitotane level was 10.2 on " 19. Mitotane troughs did not rise above 10 despite increase in dose to 1500 BID and then 1500+2000. Started 2000mg BID on 10/28/20.   - Saw radiation oncology at [facility], radiation oncology at [facility], as well as endocrine oncology (Dr. Huertas) at [facility].   - S/p adjuvant RT by Dr. Monsivais - 5040 cGy over 30 fr (18-10/6/18).  - 19: OSH CT C/A/P and MRI brain with contrast - no evidence of disease recurrence.   - 20, 20, 20: CT C/A/P with contrast - AFUA.    REVIEW OF SYSTEMS: 14 point ROS negative other than the symptoms noted above in the HPI.    PAST MEDICAL HISTORY:  Past Medical History:   Diagnosis Date     Adrenal cortex cancer, right (H) 2018    Resected 18, Dr. Mansfield.     Adrenal mass (H)      Chocolate cyst of ovary      History of miscarriage      Malignant neoplasm of cortex of right adrenal gland (H) 2018    EOTD; 19.  Check in May. SCP started.  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) 2018   Cushing's syndrome, secondary to #1 (300 mcg/24 hr); Rx hydrocortisone 20 + 10 post op   Post operative defect right hemidiaphragm with ?worsening right effussion, not present preop   Currently on mitotane, 500 mg, BID x 1 week + hydrocortisone     MTHFR mutation (H)    MHTFR mutation with h/o mutiple miscarriages.    PAST SURGICAL HISTORY:   Past Surgical History:   Procedure Laterality Date     ADRENALECTOMY Right 2018    Procedure: ADRENALECTOMY;  Right Adrenalectomy,  Embolize Liver , Anesthesia Block;  Surgeon: Warren Mansfield MD;  Location: [facility] OR     ADRENALECTOMY        SECTION      twice      SECTION      2     EMBOLECTOMY ABDOMEN N/A 2018    Procedure: EMBOLECTOMY ABDOMEN;;  Surgeon: Ector Mcintyre MD;  Location: [facility] OR     EXTRACORPOREAL SHOCK WAVE LITHOTRIPSY (ESWL)       GASTRIC BYPASS         SOCIAL HISTORY:   Social History     Tobacco Use     Smoking status: Never Smoker     Smokeless tobacco: Never Used   Substance Use Topics     Alcohol use: Yes     Comment: occ.     Drug use: No     FAMILY HISTORY:   Family History   Problem Relation Age of Onset     Depression Paternal Grandmother      ALLERGIES:   Allergies   Allergen Reactions     Bee Venom Swelling     Ibuprofen Hives and Swelling     CURRENT MEDICATIONS:   Current Outpatient Medications:      acetaminophen (TYLENOL) 325 MG tablet, Take 2 tablets (650 mg) by mouth every 4 hours as needed for other (multimodal surgical pain management along with NSAIDS and opioid medication as indicated based on pain control and physical function.), Disp: 150 tablet, Rfl: 0     buPROPion (WELLBUTRIN) 100 MG tablet, TAKE 1 TABLET (100 MG TOTAL) BY MOUTH 2 (TWO) TIMES A DAY., Disp: , Rfl: 3     calcium carbonate 500 mg, elemental, (OSCAL) 500 MG tablet, Take 1 tablet (500 mg) by mouth 2 times daily, Disp: 180 tablet, Rfl: 3     cholecalciferol (VITAMIN D-1000 MAX ST) 1000 units TABS, Take 2,000 Units by mouth every morning , Disp: , Rfl:      EPINEPHrine (EPIPEN/ADRENACLICK/OR ANY BX GENERIC EQUIV) 0.3 MG/0.3ML injection 2-pack, As directed for bee stings, Disp: , Rfl:      ferrous fumarate 65 mg, [place]. FE,-Vitamin C 125 mg (VITRON-C)  MG TABS tablet, Take 1 tablet by mouth 3 times daily, Disp: 270 tablet, Rfl: 1     fludrocortisone (FLORINEF) 0.1 MG tablet, Take 1 tablet (0.1 mg) by mouth daily, Disp: 90 tablet, Rfl: 1     FLUoxetine (PROZAC) 40 MG capsule, Take 60 mg by mouth daily , Disp: , Rfl:      gabapentin (NEURONTIN) 300 MG capsule, Take 300 mg by mouth 3 times daily, Disp: , Rfl:      hydrocortisone (CORTEF) 10 MG tablet, Take 3 tablets (30 MG) every morning and take 2 tablets (20 MG) by mouth every day at 2 PM. Additional as directed., Disp: 500 tablet, Rfl: 3     levothyroxine (SYNTHROID/LEVOTHROID) 125 MCG tablet, Take 1 tablet (125 mcg) by  mouth daily, Disp: 90 tablet, Rfl: 3     mitotane (LYSODREN) 500 MG tablet, Take 2-4 tablets (1,000-2,000 mg) by mouth 3 times daily 2000mg AM, 1000mg afternoon and 2000mg PM, Disp: 360 tablet, Rfl: 0     Multiple Vitamins-Calcium (ONE-A-DAY WOMENS PO), Take 2 tablets by mouth every morning , Disp: , Rfl:      ondansetron (ZOFRAN) 8 MG tablet, Take 1 tablet (8 mg) by mouth every 8 hours as needed for nausea, Disp: 30 tablet, Rfl: 0     prochlorperazine (COMPAZINE) 5 MG tablet, Take 1 tablet (5 mg) by mouth every 8 hours as needed for nausea or vomiting, Disp: 90 tablet, Rfl: 3     traZODone (DESYREL) 50 MG tablet, Take 2.5 tablets (125 mg) by mouth At Bedtime for 28 days, THEN 2 tablets (100 mg) At Bedtime for 28 days, THEN 1.5 tablets (75 mg) At Bedtime for 28 days, THEN 1 tablet (50 mg) At Bedtime for 28 days., Disp: 196 tablet, Rfl: 0     traZODone (DESYREL) 50 MG tablet, Take 2 tablets (100 mg) by mouth nightly as needed for sleep, Disp: 60 tablet, Rfl: 0     UNABLE TO FIND, medical cannabis (Patient's own supply. Not a prescription), Disp: , Rfl:      vitamin B1 (THIAMINE) 50 MG tablet, Take 1 tablet (50 mg) by mouth daily, Disp: 90 tablet, Rfl: 1     diazepam (VALIUM) 5 MG tablet, Take 1 tablet (5 mg) by mouth once as needed for anxiety (MRI claustrophobia management) (Patient not taking: Reported on 10/28/2020), Disp: 2 tablet, Rfl: 0    PHYSICAL EXAMINATION:  Vital signs: /80 (BP Location: Left arm, Patient Position: Sitting, Cuff Size: Adult Regular)   Pulse 79   Temp 99.1  F (37.3  C) (Tympanic)   Resp 16   Wt 72 kg (158 lb 11.2 oz)   SpO2 98%   BMI 27.23 kg/m    ECOG performance status of 2.   GENERAL: Young lady, sitting in chair. Appears tremulous, but in no acute distress.  HEENT: No icterus, no pallor. MMM, PERRL, OP clear.   NECK: Supple, no JVD/LAD.  LUNGS: CTAB, normal WOB on RA.  CARDIOVASCULAR: Regular rate and rhythm, no murmurs.   ABDOMEN: Soft, NT, ND, no masses appreciated, normal  BS.   EXTREMITIES: No cyanosis, no clubbing, no edema.   NEUROLOGIC: Alert and fully oriented. Hands and feet are tremulous.   PSYCH: Affect flat, mood slightly anxious/depressed    LABORATORY DATA:   ab Test 12/09/20  1530 10/28/20  1532 09/16/20  1437   WBC 7.5 10.1 5.6   RBC 4.40 4.13 4.49   HGB 14.0 13.3 14.5   HCT 42.4 40.8 43.4   MCV 96 99 97   MCH 31.8 32.2 32.3   MCHC 33.0 32.6 33.4   RDW 13.0 13.3 13.6    145* 197   NEUTROPHIL 78.3 84.3 80.6    140 140   POTASSIUM 4.1 3.6 3.4   CHLORIDE 106 110* 107   CO2 28 26 26   ANIONGAP 4 5 7   GLC 83 85 90   BUN 10 9 10   CR 0.72 0.67 0.76   SHASHA 8.0* 8.1* 7.8*   PROTTOTAL 6.0* 5.7* 6.0*   ALBUMIN 3.0* 3.0* 3.0*   BILITOTAL 0.2 0.3 0.3   ALKPHOS 85 73 85   AST 18 18 22   ALT 22 20 27     Lab Test 12/09/20  1530 10/28/20  1532 09/16/20  1437 08/05/20  0736 07/16/20  0723   TSH 0.25* 0.20* 0.63 0.75 0.72   T4 1.03 1.06 1.02 0.96 0.85     Lab Test 12/09/20  1530 09/16/20  1437   CHOL 174 175    108   LDL 47 38   TRIG 107 145     DHEAS was 740 on 6/5/18, and has been <15 on 7/12/18, 8/20/18, 9/19/18, 10/23/18, 11/27/18, 1/9/19, 5/8/19, 6/18/19, 7/17/19, 11/15/19, 12/12/19, 1/22/20, 3/30/20 and 6/8/20, 7/21/20, 8/5/20.  Mitotane level (target 14-20): 1.8ng/dl on 8/20/18, 3.5 on 10/25/18, 6.2 on 12/4/18, 8.1 on 1/9/19 and 10.2 on 2/11/19, 14.2 on 6/18/19. 10.8 on 12/12/19.  10 on 1/24/20. 8.9 on 5/14/20. 8.6 on 7/21/20, 8.3 on 8/5/20.   Labs from [facility] 9/24/19 - WBC 4200, ANC 2600, Hb 10.4, Platelets 218K, lytes WNL, creat 0.61, Calcium 8, albumin 3.5, LFTs normal, mitotane level 18.8, cortisol 25.8, ACTH<5, aldosterone 4.9, free T4 1.17, TSH 0.07, DHEAS <15, renin plasma 10.6.     ASSESSMENT AND PLAN: A 35-year-old lady with right-sided functioning adrenocortical carcinoma.     Adrenocortical carcinoma:  - Started on adjuvant mitotane in July 2018 based on her presentation and tumor characteristics including invasion of the adrenal capsule, high mitotic  rate, possibly positive margins, and high Ki67, which could result in a rate of recurrence as high as 40%.    - Reviewed restaging CT C/A/P from Dec 2020 showed no evidence of disease recurrence.  - She's ~2.5 years out from surgery and explained that this is an excellent finding as recurrence rates drop quite a bit after year 2.  - Despite multiple supportive care interventions, patient has NOT been able to tolerate higher doses (above 3gm/day) of mitotane. She wanted to try a higher dose and is now on total of 5g/day with obvious and some moderate/severe side effects. She wants to continue at this dose for some time.   - Will follow-up on mitotane levels from today. Advised to monitor closely.   - Every 6 weekly mitotane level, CBC, comprehensive panel, TSH, FT4, DHEAS, ACTH, periodic cholesterol panel, and 24-hour Urine Free Cortisol due to COVID pandemic.  - Plan to continue adjuvant Mitotane for up to 5 yrs if tolerated.  - Next restaging CT C/A/P in ~4-6 months.     Headache, slurred speech, abnormal MRI brain:  - Likely due to mitotane but MRI brain with contrast had abnormal basal ganglia signal.   - Refer to Neurology for eval of any alternative etiologies. Monitor for new/worsening symptoms.    Pre-syncope/palpitations:  - No clear cardiac etiology evident but will get EKG today to eval QTc. ?anxiety-related. Pt to seek medical attn if these persist.    Nausea, chronic:  - Advised on long-term AEs with compazine use. Avoiding benzos due to potential for addiction.    Endocrinopathies:  - Mgmt per endocrine team at the . Follow-up planned in the next couple months.     Return to see me in 6 weeks.     BILLIN    Benson Cintron M.D.  Maryt. Professor of Medicine  Genitourinary Oncology  Division of Hematology, Oncology & Transplantation  [facility]

## 2020-12-10 LAB
ACTH PLAS-MCNC: <10 PG/ML
DHEA-S SERPL-MCNC: <15 UG/DL (ref 35–430)
INTERPRETATION ECG - MUSE: NORMAL
MISCELLANEOUS TEST: NORMAL

## 2020-12-10 NOTE — PROGRESS NOTES
"MEDICAL ONCOLOGY CLINIC NOTE    REFERRING PROVIDER: Warren Mansfield MD from [facility] clinic.      REASON FOR CURRENT VISIT: Follow-up for adrenocortical carcinoma, currently on adjuvant mitotane.    HISTORY OF PRESENT ILLNESS: Ms. 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     Suzy increased mitotane dose to [id number] a few weeks ago. Hot flashes, headache resolved but anorexia, gaining weight, nausea (using compazine 10mg QAM+QPM), bloating x 1 week. Occasional lose BM (1-2x/day) but without change in color or bleeding. Fatigue moderate, no slurred speech but does have \"loss of words\". Also has involuntary tremors similar to what she had with the higher doses in the past. Anxiety significant and she has episodes of sudden-onset palpitations, pre-syncope (without CP/SOA/neuro symptoms) which resolve within a minute or so with deep breathing and sitting down. Also has significant insomnia. Legs with long-standing dull aching pain which is helped by gabapentin 900mg TID.    Saw Dr. Huertas in Oct 2020 and continues hydrocortisone 30mg QAM, 20mg early PM; fluodrocortisone 0.1mg/day and levothyroxine to 125 mcg/day and then 2 tablets on Sunday. She also follows with Dr. Veena Jaquez in our endocrinology department.     Denies any fever, diarrhea, dizziness, wt loss. No clinical signs/symptoms of recurrence although she has non-specific MSK RU chest pain and SANCHEZ.    ONCOLOGIC HISTORY:  1. 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   - CT abdomen and pelvis stone protocol without contrast 5 8018 showed \"9.2 x 8 cm heterogeneous right upper quadrant mass with small foci of calcification " "within it which is likely arising from the adrenal gland though inseparable from liver and upper pole of right kidney. It is incompletely evaluated on this noncontrast study. 3 x 2.6 cm mass right lobe of liver on image #85 also incompletely evaluated. 6 x 4 x 4 mm upper third left ureteral obstructing stone with mild to moderate secondary hydronephrosis. Collection of stones at lower pole left kidney extending over an area of approximately 6 x 7 x 4 mm. Additional nonobstructing 1 mm stone upper pole left kidney. 2 mm nonobstructing stone noted upper pole right kidney with no hydronephrosis on the right. Postop change consistent with gastric bypass. Noncontrast images of spleen, left adrenal gland, gallbladder, and pancreas unremarkable. No aneurysm. No adenopathy.\"  - Seen by Dr. Delvalle at [facility] clinic. Referred to Dr. Alvino Patel and then Dr. Warren Mansfield.  - Endocrinology team directed workup showed high DHEAS level of 740 pre-surgery.   - Right open adrenalectomy and hepatic mobilization performed by Dr. Warren Mansfield on 6/25/2018. Pathology showed adrenocortical carcinoma measuring 11.3 cm, weighing 413 g with extension into or through the adrenal capsule negative lymphovascular invasion, tumor necrosis present, margins involved by tumor, no regional lymph nodes identified, pathologic stage T2 NX.  pathology review reveals low grade ACC.  - DHEAS normalized postsurgery.   - Adjuvant Mitotane started on 7/24/18. Dose increased to 2000mg TID around 10/23/18 due to persistently low troughs. Held 1/16/19 for two weeks and resumed 1/30 at 1000 mg po BID due to very poor tolerance of higher doses. Highest mitotane level was 10.2 on 2/11/19. Mitotane troughs did not rise above 10 despite increase in dose to 1500 BID and then 1500+2000. Started 2000mg BID on 10/28/20.   - Saw radiation oncology at [facility], radiation oncology at [facility], as well as endocrine " oncology (Dr. Huertas) at [facility].   - S/p adjuvant RT by Dr. Monsivais - 5040 cGy over 30 fr (18-10/6/18).  - 19: OSH CT C/A/P and MRI brain with contrast - no evidence of disease recurrence.   - 20, 20, 20: CT C/A/P with contrast - AFUA.    REVIEW OF SYSTEMS: 14 point ROS negative other than the symptoms noted above in the HPI.    PAST MEDICAL HISTORY:  Past Medical History:   Diagnosis Date     Adrenal cortex cancer, right (H) 2018    Resected 18, Dr. Mansfield.     Adrenal mass (H)      Chocolate cyst of ovary      History of miscarriage      Malignant neoplasm of cortex of right adrenal gland (H) 2018    EOTD; 19.  Check in May. SCP started.  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) 2018   Cushing's syndrome, secondary to #1 (300 mcg/24 hr); Rx hydrocortisone 20 + 10 post op   Post operative defect right hemidiaphragm with ?worsening right effussion, not present preop   Currently on mitotane, 500 mg, BID x 1 week + hydrocortisone     MTHFR mutation (H)    MHTFR mutation with h/o mutiple miscarriages.    PAST SURGICAL HISTORY:   Past Surgical History:   Procedure Laterality Date     ADRENALECTOMY Right 2018    Procedure: ADRENALECTOMY;  Right Adrenalectomy,  Embolize Liver , Anesthesia Block;  Surgeon: Warren Mansfield MD;  Location: [facility] OR     ADRENALECTOMY        SECTION      twice      SECTION      2     EMBOLECTOMY ABDOMEN N/A 2018    Procedure: EMBOLECTOMY ABDOMEN;;  Surgeon: Ector Mcintyre MD;  Location: [facility] OR     EXTRACORPOREAL SHOCK WAVE LITHOTRIPSY (ESWL)       GASTRIC BYPASS        SOCIAL HISTORY:   Social History     Tobacco Use     Smoking status: Never Smoker     Smokeless tobacco: Never Used   Substance Use Topics     Alcohol use: Yes     Comment: occ.     Drug use: No     FAMILY HISTORY:   Family History   Problem Relation Age of Onset      Depression Paternal Grandmother      ALLERGIES:   Allergies   Allergen Reactions     Bee Venom Swelling     Ibuprofen Hives and Swelling     CURRENT MEDICATIONS:   Current Outpatient Medications:      acetaminophen (TYLENOL) 325 MG tablet, Take 2 tablets (650 mg) by mouth every 4 hours as needed for other (multimodal surgical pain management along with NSAIDS and opioid medication as indicated based on pain control and physical function.), Disp: 150 tablet, Rfl: 0     buPROPion (WELLBUTRIN) 100 MG tablet, TAKE 1 TABLET (100 MG TOTAL) BY MOUTH 2 (TWO) TIMES A DAY., Disp: , Rfl: 3     calcium carbonate 500 mg, elemental, (OSCAL) 500 MG tablet, Take 1 tablet (500 mg) by mouth 2 times daily, Disp: 180 tablet, Rfl: 3     cholecalciferol (VITAMIN D-1000 MAX ST) 1000 units TABS, Take 2,000 Units by mouth every morning , Disp: , Rfl:      EPINEPHrine (EPIPEN/ADRENACLICK/OR ANY BX GENERIC EQUIV) 0.3 MG/0.3ML injection 2-pack, As directed for bee stings, Disp: , Rfl:      ferrous fumarate 65 mg, [place]. FE,-Vitamin C 125 mg (VITRON-C)  MG TABS tablet, Take 1 tablet by mouth 3 times daily, Disp: 270 tablet, Rfl: 1     fludrocortisone (FLORINEF) 0.1 MG tablet, Take 1 tablet (0.1 mg) by mouth daily, Disp: 90 tablet, Rfl: 1     FLUoxetine (PROZAC) 40 MG capsule, Take 60 mg by mouth daily , Disp: , Rfl:      gabapentin (NEURONTIN) 300 MG capsule, Take 300 mg by mouth 3 times daily, Disp: , Rfl:      hydrocortisone (CORTEF) 10 MG tablet, Take 3 tablets (30 MG) every morning and take 2 tablets (20 MG) by mouth every day at 2 PM. Additional as directed., Disp: 500 tablet, Rfl: 3     levothyroxine (SYNTHROID/LEVOTHROID) 125 MCG tablet, Take 1 tablet (125 mcg) by mouth daily, Disp: 90 tablet, Rfl: 3     mitotane (LYSODREN) 500 MG tablet, Take 2-4 tablets (1,000-2,000 mg) by mouth 3 times daily 2000mg AM, 1000mg afternoon and 2000mg PM, Disp: 360 tablet, Rfl: 0     Multiple Vitamins-Calcium (ONE-A-DAY WOMENS PO), Take 2 tablets  by mouth every morning , Disp: , Rfl:      ondansetron (ZOFRAN) 8 MG tablet, Take 1 tablet (8 mg) by mouth every 8 hours as needed for nausea, Disp: 30 tablet, Rfl: 0     prochlorperazine (COMPAZINE) 5 MG tablet, Take 1 tablet (5 mg) by mouth every 8 hours as needed for nausea or vomiting, Disp: 90 tablet, Rfl: 3     traZODone (DESYREL) 50 MG tablet, Take 2.5 tablets (125 mg) by mouth At Bedtime for 28 days, THEN 2 tablets (100 mg) At Bedtime for 28 days, THEN 1.5 tablets (75 mg) At Bedtime for 28 days, THEN 1 tablet (50 mg) At Bedtime for 28 days., Disp: 196 tablet, Rfl: 0     traZODone (DESYREL) 50 MG tablet, Take 2 tablets (100 mg) by mouth nightly as needed for sleep, Disp: 60 tablet, Rfl: 0     UNABLE TO FIND, medical cannabis (Patient's own supply. Not a prescription), Disp: , Rfl:      vitamin B1 (THIAMINE) 50 MG tablet, Take 1 tablet (50 mg) by mouth daily, Disp: 90 tablet, Rfl: 1     diazepam (VALIUM) 5 MG tablet, Take 1 tablet (5 mg) by mouth once as needed for anxiety (MRI claustrophobia management) (Patient not taking: Reported on 10/28/2020), Disp: 2 tablet, Rfl: 0    PHYSICAL EXAMINATION:  Vital signs: /80 (BP Location: Left arm, Patient Position: Sitting, Cuff Size: Adult Regular)   Pulse 79   Temp 99.1  F (37.3  C) (Tympanic)   Resp 16   Wt 72 kg (158 lb 11.2 oz)   SpO2 98%   BMI 27.23 kg/m    ECOG performance status of 2.   GENERAL: Young lady, sitting in chair. Appears tremulous, but in no acute distress.  HEENT: No icterus, no pallor. MMM, PERRL, OP clear.   NECK: Supple, no JVD/LAD.  LUNGS: CTAB, normal WOB on RA.  CARDIOVASCULAR: Regular rate and rhythm, no murmurs.   ABDOMEN: Soft, NT, ND, no masses appreciated, normal BS.   EXTREMITIES: No cyanosis, no clubbing, no edema.   NEUROLOGIC: Alert and fully oriented. Hands and feet are tremulous.   PSYCH: Affect flat, mood slightly anxious/depressed    LABORATORY DATA:   ab Test 12/09/20  1530 10/28/20  1532 09/16/20  1437   WBC 7.5 10.1  5.6   RBC 4.40 4.13 4.49   HGB 14.0 13.3 14.5   HCT 42.4 40.8 43.4   MCV 96 99 97   MCH 31.8 32.2 32.3   MCHC 33.0 32.6 33.4   RDW 13.0 13.3 13.6    145* 197   NEUTROPHIL 78.3 84.3 80.6    140 140   POTASSIUM 4.1 3.6 3.4   CHLORIDE 106 110* 107   CO2 28 26 26   ANIONGAP 4 5 7   GLC 83 85 90   BUN 10 9 10   CR 0.72 0.67 0.76   SHASHA 8.0* 8.1* 7.8*   PROTTOTAL 6.0* 5.7* 6.0*   ALBUMIN 3.0* 3.0* 3.0*   BILITOTAL 0.2 0.3 0.3   ALKPHOS 85 73 85   AST 18 18 22   ALT 22 20 27     Lab Test 12/09/20  1530 10/28/20  1532 09/16/20  1437 08/05/20  0736 07/16/20  0723   TSH 0.25* 0.20* 0.63 0.75 0.72   T4 1.03 1.06 1.02 0.96 0.85     Lab Test 12/09/20  1530 09/16/20  1437   CHOL 174 175    108   LDL 47 38   TRIG 107 145     DHEAS was 740 on 6/5/18, and has been <15 on 7/12/18, 8/20/18, 9/19/18, 10/23/18, 11/27/18, 1/9/19, 5/8/19, 6/18/19, 7/17/19, 11/15/19, 12/12/19, 1/22/20, 3/30/20 and 6/8/20, 7/21/20, 8/5/20.  Mitotane level (target 14-20): 1.8ng/dl on 8/20/18, 3.5 on 10/25/18, 6.2 on 12/4/18, 8.1 on 1/9/19 and 10.2 on 2/11/19, 14.2 on 6/18/19. 10.8 on 12/12/19.  10 on 1/24/20. 8.9 on 5/14/20. 8.6 on 7/21/20, 8.3 on 8/5/20.   Labs from [facility] 9/24/19 - WBC 4200, ANC 2600, Hb 10.4, Platelets 218K, lytes WNL, creat 0.61, Calcium 8, albumin 3.5, LFTs normal, mitotane level 18.8, cortisol 25.8, ACTH<5, aldosterone 4.9, free T4 1.17, TSH 0.07, DHEAS <15, renin plasma 10.6.     ASSESSMENT AND PLAN: A 35-year-old lady with right-sided functioning adrenocortical carcinoma.     Adrenocortical carcinoma:  - Started on adjuvant mitotane in July 2018 based on her presentation and tumor characteristics including invasion of the adrenal capsule, high mitotic rate, possibly positive margins, and high Ki67, which could result in a rate of recurrence as high as 40%.    - Reviewed restaging CT C/A/P from Dec 2020 showed no evidence of disease recurrence.  - She's ~2.5 years out from surgery and explained that this is an  excellent finding as recurrence rates drop quite a bit after year 2.  - Despite multiple supportive care interventions, patient has NOT been able to tolerate higher doses (above 3gm/day) of mitotane. She wanted to try a higher dose and is now on total of 5g/day with obvious and some moderate/severe side effects. She wants to continue at this dose for some time.   - Will follow-up on mitotane levels from today. Advised to monitor closely.   - Every 6 weekly mitotane level, CBC, comprehensive panel, TSH, FT4, DHEAS, ACTH, periodic cholesterol panel, and 24-hour Urine Free Cortisol due to COVID pandemic.  - Plan to continue adjuvant Mitotane for up to 5 yrs if tolerated.  - Next restaging CT C/A/P in ~4-6 months.     Headache, slurred speech, abnormal MRI brain:  - Likely due to mitotane but MRI brain with contrast had abnormal basal ganglia signal.   - Refer to Neurology for eval of any alternative etiologies. Monitor for new/worsening symptoms.    Pre-syncope/palpitations:  - No clear cardiac etiology evident but will get EKG today to eval QTc. ?anxiety-related. Pt to seek medical attn if these persist.    Nausea, chronic:  - Advised on long-term AEs with compazine use. Avoiding benzos due to potential for addiction.    Endocrinopathies:  - Mgmt per endocrine team at the . Follow-up planned in the next couple months.     Return to see me in 6 weeks.     BILLIN    Benson Cintron M.D.  . Professor of Medicine  Genitourinary Oncology  Division of Hematology, Oncology & Transplantation  [facility]

## 2020-12-14 ENCOUNTER — COMMUNICATION - HEALTHEAST (OUTPATIENT)
Dept: FAMILY MEDICINE | Facility: CLINIC | Age: 35
End: 2020-12-14

## 2020-12-15 ENCOUNTER — VIRTUAL VISIT (OUTPATIENT)
Dept: FAMILY MEDICINE | Facility: CLINIC | Age: 35
End: 2020-12-15
Payer: COMMERCIAL

## 2020-12-15 DIAGNOSIS — F41.9 ANXIETY: Primary | ICD-10-CM

## 2020-12-15 DIAGNOSIS — C74.01 MALIGNANT NEOPLASM OF CORTEX OF RIGHT ADRENAL GLAND (H): ICD-10-CM

## 2020-12-15 DIAGNOSIS — R11.0 NAUSEA: ICD-10-CM

## 2020-12-15 PROCEDURE — 82530 CORTISOL FREE: CPT | Mod: 90 | Performed by: INTERNAL MEDICINE

## 2020-12-15 PROCEDURE — 99000 SPECIMEN HANDLING OFFICE-LAB: CPT | Performed by: INTERNAL MEDICINE

## 2020-12-15 PROCEDURE — 99204 OFFICE O/P NEW MOD 45 MIN: CPT | Mod: 95 | Performed by: FAMILY MEDICINE

## 2020-12-15 PROCEDURE — 82542 COL CHROMOTOGRAPHY QUAL/QUAN: CPT | Mod: 90 | Performed by: INTERNAL MEDICINE

## 2020-12-15 RX ORDER — GABAPENTIN 300 MG/1
300 CAPSULE ORAL 3 TIMES DAILY
Qty: 270 CAPSULE | Refills: 1 | Status: SHIPPED | OUTPATIENT
Start: 2020-12-15 | End: 2021-03-16

## 2020-12-15 RX ORDER — FLUOXETINE 40 MG/1
60 CAPSULE ORAL DAILY
Status: CANCELLED | OUTPATIENT
Start: 2020-12-15

## 2020-12-15 RX ORDER — CLONAZEPAM 0.5 MG/1
0.5 TABLET ORAL 2 TIMES DAILY PRN
Qty: 60 TABLET | Refills: 0 | Status: SHIPPED | OUTPATIENT
Start: 2020-12-15

## 2020-12-15 RX ORDER — BUPROPION HYDROCHLORIDE 100 MG/1
TABLET ORAL
Refills: 3 | Status: CANCELLED | OUTPATIENT
Start: 2020-12-15

## 2020-12-15 RX ORDER — PROCHLORPERAZINE MALEATE 5 MG
5 TABLET ORAL EVERY 8 HOURS PRN
Qty: 90 TABLET | Refills: 3 | Status: SHIPPED | OUTPATIENT
Start: 2020-12-15 | End: 2022-11-21

## 2020-12-15 ASSESSMENT — ANXIETY QUESTIONNAIRES
6. BECOMING EASILY ANNOYED OR IRRITABLE: NEARLY EVERY DAY
5. BEING SO RESTLESS THAT IT IS HARD TO SIT STILL: NEARLY EVERY DAY
GAD7 TOTAL SCORE: 21
IF YOU CHECKED OFF ANY PROBLEMS ON THIS QUESTIONNAIRE, HOW DIFFICULT HAVE THESE PROBLEMS MADE IT FOR YOU TO DO YOUR WORK, TAKE CARE OF THINGS AT HOME, OR GET ALONG WITH OTHER PEOPLE: EXTREMELY DIFFICULT
3. WORRYING TOO MUCH ABOUT DIFFERENT THINGS: NEARLY EVERY DAY
2. NOT BEING ABLE TO STOP OR CONTROL WORRYING: NEARLY EVERY DAY
7. FEELING AFRAID AS IF SOMETHING AWFUL MIGHT HAPPEN: NEARLY EVERY DAY
1. FEELING NERVOUS, ANXIOUS, OR ON EDGE: NEARLY EVERY DAY

## 2020-12-15 ASSESSMENT — PATIENT HEALTH QUESTIONNAIRE - PHQ9: 5. POOR APPETITE OR OVEREATING: NEARLY EVERY DAY

## 2020-12-15 NOTE — PROGRESS NOTES
"Suzy Rodríguez is a 35 year old female who is being evaluated via a billable video visit.      The patient has been notified of following:     \"This video visit will be conducted via a call between you and your physician/provider. We have found that certain health care needs can be provided without the need for an in-person physical exam.  This service lets us provide the care you need with a video conversation.  If a prescription is necessary we can send it directly to your pharmacy.  If lab work is needed we can place an order for that and you can then stop by our lab to have the test done at a later time.    Video visits are billed at different rates depending on your insurance coverage.  Please reach out to your insurance provider with any questions.    If during the course of the call the physician/provider feels a video visit is not appropriate, you will not be charged for this service.\"    Patient has given verbal consent for Video visit? Yes  How would you like to obtain your AVS? MyChart  If you are dropped from the video visit, the video invite should be resent to: Text to cell phone: [phone]  Will anyone else be joining your video visit? No      Video Start time:203pm    -------------------------    Assessment/Plan:    Suzy Rodríguez is a 35 year old female presenting for:    1. Nausea  Refill -nausea.  - prochlorperazine (COMPAZINE) 5 MG tablet; Take 1 tablet (5 mg) by mouth every 8 hours as needed for nausea or vomiting  Dispense: 90 tablet; Refill: 3    2. Anxiety  Patient will continue her Prozac and Wellbutrin.  Klonopin will be added which she can take on an as-needed basis.  She will try to minimize this.  Discussed that if she needs to take this twice daily for a few weeks and that is okay but afterwards will want to start decreasing to get her off of it completely.    Patient is in agreement with this plan.  Discussed risks and benefits of the medication.  She tolerated it well " "in the past.  - gabapentin (NEURONTIN) 300 MG capsule; Take 1 capsule (300 mg) by mouth 3 times daily  Dispense: 270 capsule; Refill: 1  - clonazePAM (KLONOPIN) 0.5 MG tablet; Take 1 tablet (0.5 mg) by mouth 2 times daily as needed for anxiety  Dispense: 60 tablet; Refill: 0      Medications Discontinued During This Encounter   Medication Reason     traZODone (DESYREL) 50 MG tablet      vitamin B1 (THIAMINE) 50 MG tablet      gabapentin (NEURONTIN) 300 MG capsule Reorder     prochlorperazine (COMPAZINE) 5 MG tablet Reorder           Chief Complaint:  Anxiety          Subjective:   Suzy Rodríguez is a pleasant 35 year old female with a past medical history significant for adrenal cortical carcinoma currently on adjuvant mitotane being evaluated via video visit today for the following concern/s:     Anxiety: Patient has a past medical history significant for anxiety.  She is currently on Prozac and Wellbutrin.  She does well with medications however recently things have been more anxiety provoking.  She notes that there have been some financial issues in the family due to the COVID-19 crisis.  She also notes that they will be flying to see the relatives in few weeks and she very anxious about this.  She states that she is very anxious on planes in general and is even more so this year due to COVID-19.    She is not taking any \"as needed\" medication for anxiety at this point.  She has used Klonopin in the past which seemed to help.  She finds that she is having fairly consistent anxiety attacks several times daily.  She states that she is crying several times daily due to her anxiety.  No suicidal or homicidal ideations.      12 point review of systems completed and negative except for what has been described above    History   Smoking Status     Never Smoker   Smokeless Tobacco     Never Used         Current Outpatient Medications:      acetaminophen (TYLENOL) 325 MG tablet, Take 2 tablets (650 mg) by mouth every " 4 hours as needed for other (multimodal surgical pain management along with NSAIDS and opioid medication as indicated based on pain control and physical function.), Disp: 150 tablet, Rfl: 0     buPROPion (WELLBUTRIN) 100 MG tablet, TAKE 1 TABLET (100 MG TOTAL) BY MOUTH 2 (TWO) TIMES A DAY., Disp: , Rfl: 3     calcium carbonate 500 mg, elemental, (OSCAL) 500 MG tablet, Take 1 tablet (500 mg) by mouth 2 times daily, Disp: 180 tablet, Rfl: 3     cholecalciferol (VITAMIN D-1000 MAX ST) 1000 units TABS, Take 2,000 Units by mouth every morning , Disp: , Rfl:      clonazePAM (KLONOPIN) 0.5 MG tablet, Take 1 tablet (0.5 mg) by mouth 2 times daily as needed for anxiety, Disp: 60 tablet, Rfl: 0     EPINEPHrine (EPIPEN/ADRENACLICK/OR ANY BX GENERIC EQUIV) 0.3 MG/0.3ML injection 2-pack, As directed for bee stings, Disp: , Rfl:      ferrous fumarate 65 mg, [place]. FE,-Vitamin C 125 mg (VITRON-C)  MG TABS tablet, Take 1 tablet by mouth 3 times daily, Disp: 270 tablet, Rfl: 1     fludrocortisone (FLORINEF) 0.1 MG tablet, Take 1 tablet (0.1 mg) by mouth daily, Disp: 90 tablet, Rfl: 1     FLUoxetine (PROZAC) 40 MG capsule, Take 60 mg by mouth daily , Disp: , Rfl:      gabapentin (NEURONTIN) 300 MG capsule, Take 1 capsule (300 mg) by mouth 3 times daily, Disp: 270 capsule, Rfl: 1     hydrocortisone (CORTEF) 10 MG tablet, Take 3 tablets (30 MG) every morning and take 2 tablets (20 MG) by mouth every day at 2 PM. Additional as directed., Disp: 500 tablet, Rfl: 3     levothyroxine (SYNTHROID/LEVOTHROID) 125 MCG tablet, Take 1 tablet (125 mcg) by mouth daily, Disp: 90 tablet, Rfl: 3     mitotane (LYSODREN) 500 MG tablet, Take 2-4 tablets (1,000-2,000 mg) by mouth 3 times daily 2000mg AM, 1000mg afternoon and 2000mg PM, Disp: 360 tablet, Rfl: 0     Multiple Vitamins-Calcium (ONE-A-DAY WOMENS PO), Take 2 tablets by mouth every morning , Disp: , Rfl:      ondansetron (ZOFRAN) 8 MG tablet, Take 1 tablet (8 mg) by mouth every 8 hours as  needed for nausea, Disp: 30 tablet, Rfl: 0     prochlorperazine (COMPAZINE) 5 MG tablet, Take 1 tablet (5 mg) by mouth every 8 hours as needed for nausea or vomiting, Disp: 90 tablet, Rfl: 3     traZODone (DESYREL) 50 MG tablet, Take 2.5 tablets (125 mg) by mouth At Bedtime for 28 days, THEN 2 tablets (100 mg) At Bedtime for 28 days, THEN 1.5 tablets (75 mg) At Bedtime for 28 days, THEN 1 tablet (50 mg) At Bedtime for 28 days., Disp: 196 tablet, Rfl: 0     UNABLE TO FIND, medical cannabis (Patient's own supply. Not a prescription), Disp: , Rfl:      diazepam (VALIUM) 5 MG tablet, Take 1 tablet (5 mg) by mouth once as needed for anxiety (MRI claustrophobia management) (Patient not taking: Reported on 10/28/2020), Disp: 2 tablet, Rfl: 0        Objective:  No vitals were done due to the nature of this visit  No flowsheet data found.            General: No acute distress  Psych: Appropriate affect, fairly tearful throughout our conversation, coherent thought processes  HEENT: moist mucous membranes  Pulmonary: Breathing comfortably, speaking in complete sentences  Extremities: warm and well perfused with no edema  Skin: warm and dry with no rash         This note has been dictated and transcribed using voice recognition software.   Any errors in transcription are unintentional and inherent to the software.    Video-Visit Details    Type of service:  Video Visit    Video End Time:2:22 PM    Originating Location (pt. Location): Home    Distant Location (provider location):  [facility] [place]     Platform used for Video Visit: Lillian

## 2020-12-16 ASSESSMENT — ANXIETY QUESTIONNAIRES: GAD7 TOTAL SCORE: 21

## 2020-12-17 NOTE — PROGRESS NOTES
"Suzy Rodríguez is a 35 year old female who is being evaluated via a billable video visit.      The patient has been notified of following:     \"This video visit will be conducted via a call between you and your physician/provider. We have found that certain health care needs can be provided without the need for an in-person physical exam.  This service lets us provide the care you need with a video conversation.  If a prescription is necessary we can send it directly to your pharmacy.  If lab work is needed we can place an order for that and you can then stop by our lab to have the test done at a later time.    Video visits are billed at different rates depending on your insurance coverage.  Please reach out to your insurance provider with any questions.    If during the course of the call the physician/provider feels a video visit is not appropriate, you will not be charged for this service.\"    Patient has given verbal consent for Video visit? Yes  How would you like to obtain your AVS? MyChart  If you are dropped from the video visit, the video invite should be resent to: Text to cell phone: [phone]  Will anyone else be joining your video visit? No      "

## 2020-12-19 LAB
COLLECT DURATION TIME SPEC: 24 H
CORTIS F 24H UR HPLC-MCNC: 48.2 UG/L
CORTIS F 24H UR-MRATE: 86.8 UG/D
CORTIS F/CREAT 24H UR: 86.07 UG/G CRT
CREAT 24H UR-MRATE: 1008 MG/D (ref 700–1600)
CREAT UR-MCNC: 56 MG/DL
IMP & REVIEW OF LAB RESULTS: ABNORMAL
SPECIMEN VOL ?TM UR: 1800 ML

## 2020-12-21 ENCOUNTER — VIRTUAL VISIT (OUTPATIENT)
Dept: ENDOCRINOLOGY | Facility: CLINIC | Age: 35
End: 2020-12-21
Payer: COMMERCIAL

## 2020-12-21 DIAGNOSIS — E27.1 PRIMARY ADRENAL INSUFFICIENCY (H): ICD-10-CM

## 2020-12-21 DIAGNOSIS — C74.01 ADRENAL CORTEX CANCER, RIGHT (H): Primary | ICD-10-CM

## 2020-12-21 DIAGNOSIS — E03.8 SECONDARY HYPOTHYROIDISM: ICD-10-CM

## 2020-12-21 PROCEDURE — 99215 OFFICE O/P EST HI 40 MIN: CPT | Mod: 95 | Performed by: INTERNAL MEDICINE

## 2020-12-21 NOTE — LETTER
"12/21/2020       RE: Suzy Rodríguez  [address]  [facility] [id number]     Dear Colleague,    Thank you for referring your patient, Suzy Rodríguez, to the [facility] ENDO[facility] [place] at [facility]. Please see a copy of my visit note below.    Suzy Rodríguez is a 35 year old female who is being evaluated via a billable video visit.      The patient has been notified of following:     \"This video visit will be conducted via a call between you and your physician/provider. We have found that certain health care needs can be provided without the need for an in-person physical exam.  This service lets us provide the care you need with a video conversation.  If a prescription is necessary we can send it directly to your pharmacy.  If lab work is needed we can place an order for that and you can then stop by our lab to have the test done at a later time.    Video visits are billed at different rates depending on your insurance coverage.  Please reach out to your insurance provider with any questions.    If during the course of the call the physician/provider feels a video visit is not appropriate, you will not be charged for this service.\"    Patient has given verbal consent for Video visit? Yes  How would you like to obtain your AVS? MyChart  If you are dropped from the video visit, the video invite should be resent to: Text to cell phone: [phone]  Will anyone else be joining your video visit? No        Video-Visit Details    Type of service:  Video Visit    Video call duration:  Start: 12/21/2020 09:39 am   Stop: 12/21/2020 10:05 am     Originating Location (pt. Location): Home  Distant Location (provider location):  [facility]   Platform used for Video Visit: AmWell    Due to the COVID 19 pandemic this visit was converted to a video visit in order to help prevent spread of infection in this patient and the general " "population.     The patient is seen in f/up for adrenocortical cancer.     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  The adrenal mass was first identified in May 2018, on an abdominal CT done for evaluation of kidney stones.  The mass measured 9.2 x 8 cm on the noncontrast CT from 5/8/18, and was inseparable from the liver and upper pole of the right kidney.  The chest CT from 6/14/18 identified 2 indeterminate solid pulmonary nodules, with the largest measuring 3 mm in the subpleural posterior left lower lobe.  The patient underwent right adrenalectomy on 6/25/18.  Surgery was complicated by diaphragmatic injury during hepatic mobilization and small fracture of the right liver lobe, managed conservatively.  The pathology report (re read at [facility]) revealed the following:  \"Adrenal cortical carcinoma, low grade, oncocytic type, forming a mass 11.3 cm and weighing 412 grams (per report). Surgical resection margins are positive for tumor (per report). Lymphovascular invasion is focally present. The tumor seems to be confined to the adrenal gland.  AJCC Stage (with available surgical materials): pT2NX (8th edition, 2017). Immunoperoxidase stains were performed on paraffin sections at the referring institution and reviewed at [facility] in [place], [place].  Tumor cells are positive for calretinin, synaptophysin and Melan A, supporting the above diagnosis. Ki-67 reveal an increased proliferation index of approximately 20% in the most active areas.\"    Treatment with mitotane was started in July 2018 and she completed radiation to the adrenal bed on October 8, 2018.  Titrating up the dose of mitotane has remained difficult.  She has not been able to attain a serum mitotane level above 10.  The current dose of mitotane is 3000 mg in the morning, 1000 mg " in the afternoon and 1000 mg at bedtime.  The dose was increased from 4000 to 5000 mg daily, 1 1/2 months ago.   In December 2018 she was evaluated at [facility].  She was diagnosed with secondary hypothyroidism and started on treatment with levothyroxine.    On 10/29/2020, the dose of levothyroxine was increased by Dr. Huertas from 125 mcg daily, to 125 mcg daily, 6 days a week, and 250 mcg, 1 day a week.  As per patient, at that time, she did complain about cold hands and feet and this has improved with a higher dose of levothyroxine.  On the most recent lab work, free T4 was 1.03.    She has gained approximately 5 pounds in the last 2 months.  She questions whether or not her face appears rounder.  As per patient, her most recent blood pressure numbers have been higher than her average blood pressure (the diastolic has been in the 80s, while her baseline diastolic blood pressure has been around 65-72).    She reports being compliant in taking 30 mg hydrocortisone around 9 AM (when she wakes up) and 20 mg at 2:30-4 PM, and 0.1 mg fludrocortisone at bedtime.  Denies experiencing dizziness.    Pertinent data and labs:  5/14/18 normal plasma metanephrines  5/31/18 ALD 13.4, renin 0.8   5/28/18 urinary cortisol 351.6, 8 AM cortisol 23 (after dexamethasone), normal metanephrines     6/5/18 ACTH<10, DHEAS 740 (prior to surgery)  7/30/18  DHEAS <0.5, cortisol AM 19, testosterone <7    7/31/18 aldosterone 7.3  8/10/18 CRH 3 (normal<10)  12/3/18 cortisol PM 36, free T4 0.7, TSH 2.3, Ca 8.6,  (elevated since 11/18)   12/4/18 ACTH <5   12/6/18  DHEAS <0.5  12/20/18 ferritin 9  2/12/19 androstenedione 0.471, K 3.6, ALD 7.7 renin 4.4   2/13/19 24 hrs urinary cortisol 130.9   2/18 started on fludrocortisone at 0.1 mg daily   2/26/19 DHEAS <15   2/26/19 9 AM cortisol 7.4, ACTH 23 (5-52).   3/12/19 DHEAS <15  9/24/2019 potassium 4, sodium 137, renin 10.6, mitotane 18.8, cortisol 25.8, ACTH < 5, aldosterone 4.9, free T4  1.17, TSH 0.07, DHEAS<15 (lab work was done at 9:30 AM, outside records).  6/8/20 DHEAS <15   12/9/20 corrected calcium 8.8, TSH 0.25, free T4 1.03, DHEAS <15, ACTH,10      Ref. Range 12/25/2019 09:00 1/23/2020 08:00 7/21/2020 10:30 10/29/2020 11:56 12/15/2020 11:45   Cortisol Free Urine Latest Ref Range: <=45.0 ug/d 213.4 (H) 119.0 (H) 100.6 (H) Not Applicable 86.8 (H)     The recent chest, abdomen and pelvis CT from 12/7/2020 showed no evidence of recurrent or metastatic disease.  He did reveal a nonobstructing left kidney stones and adjacent left ovarian cystic structures.                Suzy underwent bariatric surgery in 6/2016. Pre-surgery weight 235 lb, inga post-surgery 135 lbs. Her weight in 4/2017 was 139 lbs. In 3/2018, her weight was 154 lbs. It was 157 lbs prior to adrenalectomy.  Prior labs revealed malabsorption and the patient continues to take 500 mg calcium twice a day and a multivitamin which contains vitamin D (unchanged over the last year). The patient does not drink milk and she occasionally has yogurt or cheese.    Past Medical History   Past Medical History:   Diagnosis Date     Adrenal cortex cancer, right (H) 6/30/2018    Resected 6/25/18, Dr. Mansfield.     Adrenal mass (H)      Chocolate cyst of ovary 2011     History of miscarriage      Malignant neoplasm of cortex of right adrenal gland (H) 6/25/2018    EOTD; 4/29/19.  Check in May. SCP started.  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) June 25, 2018   Cushing's syndrome, secondary to #1 (300 mcg/24 hr); Rx hydrocortisone 20 + 10 post op   Post operative defect right hemidiaphragm with ?worsening right effussion, not present preop   Currently on mitotane, 500 mg, BID x 1 week + hydrocortisone     MTHFR mutation (H)    Diverticulosis  Obstructive sleep apnea which resolved following gastric bypass  GERD  Iron deficiency  Anemia  Ovarian cyst   IUD inserted on 1/25/19    Past  Surgical History   Past Surgical History:   Procedure Laterality Date     ADRENALECTOMY Right 2018    Procedure: ADRENALECTOMY;  Right Adrenalectomy,  Embolize Liver , Anesthesia Block;  Surgeon: Warren Mansfield MD;  Location: [facility] OR     ADRENALECTOMY        SECTION      twice      SECTION      2     EMBOLECTOMY ABDOMEN N/A 2018    Procedure: EMBOLECTOMY ABDOMEN;;  Surgeon: Ector Mcintyre MD;  Location: [facility] OR     EXTRACORPOREAL SHOCK WAVE LITHOTRIPSY (ESWL)       GASTRIC BYPASS         Current Medications    Current Outpatient Medications:      acetaminophen (TYLENOL) 325 MG tablet, Take 2 tablets (650 mg) by mouth every 4 hours as needed for other (multimodal surgical pain management along with NSAIDS and opioid medication as indicated based on pain control and physical function.), Disp: 150 tablet, Rfl: 0     buPROPion (WELLBUTRIN) 100 MG tablet, TAKE 1 TABLET (100 MG TOTAL) BY MOUTH 2 (TWO) TIMES A DAY., Disp: , Rfl: 3     calcium carbonate 500 mg, elemental, (OSCAL) 500 MG tablet, Take 1 tablet (500 mg) by mouth 2 times daily, Disp: 180 tablet, Rfl: 3     cholecalciferol (VITAMIN D-1000 MAX ST) 1000 units TABS, Take 2,000 Units by mouth every morning , Disp: , Rfl:      clonazePAM (KLONOPIN) 0.5 MG tablet, Take 1 tablet (0.5 mg) by mouth 2 times daily as needed for anxiety, Disp: 60 tablet, Rfl: 0     fludrocortisone (FLORINEF) 0.1 MG tablet, Take 1 tablet (0.1 mg) by mouth daily, Disp: 90 tablet, Rfl: 1     FLUoxetine (PROZAC) 40 MG capsule, Take 60 mg by mouth daily , Disp: , Rfl:      gabapentin (NEURONTIN) 300 MG capsule, Take 1 capsule (300 mg) by mouth 3 times daily, Disp: 270 capsule, Rfl: 1     hydrocortisone (CORTEF) 10 MG tablet, Take 3 tablets (30 MG) every morning and take 2 tablets (20 MG) by mouth every day at 2 PM. Additional as directed., Disp: 500 tablet, Rfl: 3     levothyroxine (SYNTHROID/LEVOTHROID) 125 MCG tablet, Take 1 tablet (125 mcg) by mouth  daily, Disp: 90 tablet, Rfl: 3     mitotane (LYSODREN) 500 MG tablet, Take 2-4 tablets (1,000-2,000 mg) by mouth 3 times daily 2000mg AM, 1000mg afternoon and 2000mg PM, Disp: 360 tablet, Rfl: 0     prochlorperazine (COMPAZINE) 5 MG tablet, Take 1 tablet (5 mg) by mouth every 8 hours as needed for nausea or vomiting, Disp: 90 tablet, Rfl: 3     traZODone (DESYREL) 50 MG tablet, Take 2.5 tablets (125 mg) by mouth At Bedtime for 28 days, THEN 2 tablets (100 mg) At Bedtime for 28 days, THEN 1.5 tablets (75 mg) At Bedtime for 28 days, THEN 1 tablet (50 mg) At Bedtime for 28 days., Disp: 196 tablet, Rfl: 0     UNABLE TO FIND, medical cannabis (Patient's own supply. Not a prescription), Disp: , Rfl:      diazepam (VALIUM) 5 MG tablet, Take 1 tablet (5 mg) by mouth once as needed for anxiety (MRI claustrophobia management) (Patient not taking: Reported on 10/28/2020), Disp: 2 tablet, Rfl: 0     EPINEPHrine (EPIPEN/ADRENACLICK/OR ANY BX GENERIC EQUIV) 0.3 MG/0.3ML injection 2-pack, As directed for bee stings, Disp: , Rfl:      ferrous fumarate 65 mg, [place]. FE,-Vitamin C 125 mg (VITRON-C)  MG TABS tablet, Take 1 tablet by mouth 3 times daily, Disp: 270 tablet, Rfl: 1     Multiple Vitamins-Calcium (ONE-A-DAY WOMENS PO), Take 2 tablets by mouth every morning , Disp: , Rfl:      ondansetron (ZOFRAN) 8 MG tablet, Take 1 tablet (8 mg) by mouth every 8 hours as needed for nausea (Patient not taking: Reported on 12/17/2020), Disp: 30 tablet, Rfl: 0  Iron supplement daily for 6 months     Family History   Problem Relation Age of Onset     Depression Paternal Grandmother        Social History   with 2 children, 7 and 10-year-old. She denies smoking, drinking alcohol or using illicit drugs. Occupation: disabled.     Review of Systems   10 point review of systems negative unless specified below.  Fatigue - has improved   She had some dyshagia - improved   Palpitations present 1-2 times a week - with no reason and lasting a  few seconds - feels like she is going to faint and has to sit; the heart rate is not fast or irregular  Episodes of loose BMs; the nausea is present a couple of times a day and she has been voming 1-2 times a week   The hands tremor is improved.   Increased thirst and urine has a weird odor  Muscle pain in her legs    The headaches have been less frequent              Vital Signs     Previous Weights:    Wt Readings from Last 10 Encounters:   12/09/20 72 kg (158 lb 11.2 oz)   10/28/20 71.2 kg (157 lb)   09/16/20 69.4 kg (153 lb 1.6 oz)   08/05/20 69.3 kg (152 lb 12.8 oz)   06/08/20 68.4 kg (150 lb 14.4 oz)   02/26/20 70 kg (154 lb 6.4 oz)   01/22/20 61.2 kg (135 lb)   12/17/19 67.4 kg (148 lb 9.6 oz)   12/02/19 67.5 kg (148 lb 14.4 oz)   11/14/19 65.8 kg (145 lb)        There were no vitals taken for this visit.    Physical Exam  General Appearance: alert, no distress noted   Eyes: grossly normal to inspection, conjunctivae and sclerae normal, no lid lag or stare   Respiratory: no audible wheeze, cough, or visible cyanosis.  No visible retractions or increased work of breathing.  Able to speak fully in complete sentences.  Neurological: Cranial nerves grossly intact, mentation intact and speech normal; no tremor of the outstretched hands   Skin: no lesions on exposed skin   Psychological: mentation appears normal, affect normal, judgement and insight intact, normal speech and appearance well-groomed    Lab Results  I reviewed prior lab results documented in Epic.  TSH   Date Value Ref Range Status   12/09/2020 0.25 (L) 0.40 - 4.00 mU/L Final   10/28/2020 0.20 (L) 0.40 - 4.00 mU/L Final   09/16/2020 0.63 0.40 - 4.00 mU/L Final   08/05/2020 0.75 0.40 - 4.00 mU/L Final   07/16/2020 0.72 0.40 - 4.00 mU/L Final     T4 Free   Date Value Ref Range Status   12/09/2020 1.03 0.76 - 1.46 ng/dL Final   10/28/2020 1.06 0.76 - 1.46 ng/dL Final   09/16/2020 1.02 0.76 - 1.46 ng/dL Final   08/05/2020 0.96 0.76 - 1.46 ng/dL Final    07/16/2020 0.85 0.76 - 1.46 ng/dL Final       Assessment     1. 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 Ki-67 revealed an increased proliferation index of approximately 20%.   The tumor was functional and presented with high cortisol and DHEAS levels.  Post surgery/radiation and while undergoing treatment with mitotane (started in July 2018), the DHEAS has remained undetectable.  Adjusting the dose of mitotane to therapeutic levels remains challenging, due to side effects.  Currently, the patient tolerates fairly well a dose of 4000 mg daily.    She might benefit from enrolling in a research study and having the urinary metabolomics checked. Advised to contact Dr. Nath, at [facility].     2.  Primary adrenal insufficiency, mitotane-induced  She has been having intermittent episodes of mild hypokalemia and the renin level has been lowish.  The suppressed ACTH and the elevated urinary cortisol suggest appropriate hydrocortisone replacement.  I doubt she requires Florinef and discussed about the option of taking half a tablet daily. She has the urinary cortisol checked frequently.  She might require to have the dose of hydrocortisone increased, as the dose of mitotane is titrated up.    3. Hypothyroidism, presumed to be secondary to mitotane induced suppression of TSH   The free T4 has not significantly changed on recent lab work.  She is going to have it rechecked in January, and we are going to consider increasing the dose of levothyroxine at that time, the goal of achieving a free T4 in the upper end of normal range.    Orders Placed This Encounter   Procedures     Renin activity     Sincerely,    Veena Jaquez MD

## 2020-12-21 NOTE — PROGRESS NOTES
"Video-Visit Details    Type of service:  Video Visit    Video call duration:  Start: 12/21/2020 09:39 am   Stop: 12/21/2020 10:05 am     Originating Location (pt. Location): Home  Distant Location (provider location):  [facility]   Platform used for Video Visit: [place]    Due to the COVID 19 pandemic this visit was converted to a video visit in order to help prevent spread of infection in this patient and the general population.     The patient is seen in f/up for adrenocortical cancer.     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  The adrenal mass was first identified in May 2018, on an abdominal CT done for evaluation of kidney stones.  The mass measured 9.2 x 8 cm on the noncontrast CT from 5/8/18, and was inseparable from the liver and upper pole of the right kidney.  The chest CT from 6/14/18 identified 2 indeterminate solid pulmonary nodules, with the largest measuring 3 mm in the subpleural posterior left lower lobe.  The patient underwent right adrenalectomy on 6/25/18.  Surgery was complicated by diaphragmatic injury during hepatic mobilization and small fracture of the right liver lobe, managed conservatively.  The pathology report (re read at [facility]) revealed the following:  \"Adrenal cortical carcinoma, low grade, oncocytic type, forming a mass 11.3 cm and weighing 412 grams (per report). Surgical resection margins are positive for tumor (per report). Lymphovascular invasion is focally present. The tumor seems to be confined to the adrenal gland.  AJCC Stage (with available surgical materials): pT2NX (8th edition, 2017). Immunoperoxidase stains were performed on paraffin sections at the referring institution and reviewed at [facility] in [place], [place].  Tumor cells are positive for calretinin, synaptophysin and Melan " "A, supporting the above diagnosis. Ki-67 reveal an increased proliferation index of approximately 20% in the most active areas.\"    Treatment with mitotane was started in July 2018 and she completed radiation to the adrenal bed on October 8, 2018.  Titrating up the dose of mitotane has remained difficult.  She has not been able to attain a serum mitotane level above 10.  The current dose of mitotane is 3000 mg in the morning, 1000 mg in the afternoon and 1000 mg at bedtime.  The dose was increased from 4000 to 5000 mg daily, 1 1/2 months ago.   In December 2018 she was evaluated at [facility].  She was diagnosed with secondary hypothyroidism and started on treatment with levothyroxine.    On 10/29/2020, the dose of levothyroxine was increased by Dr. Huertas from 125 mcg daily, to 125 mcg daily, 6 days a week, and 250 mcg, 1 day a week.  As per patient, at that time, she did complain about cold hands and feet and this has improved with a higher dose of levothyroxine.  On the most recent lab work, free T4 was 1.03.    She has gained approximately 5 pounds in the last 2 months.  She questions whether or not her face appears rounder.  As per patient, her most recent blood pressure numbers have been higher than her average blood pressure (the diastolic has been in the 80s, while her baseline diastolic blood pressure has been around 65-72).    She reports being compliant in taking 30 mg hydrocortisone around 9 AM (when she wakes up) and 20 mg at 2:30-4 PM, and 0.1 mg fludrocortisone at bedtime.  Denies experiencing dizziness.    Pertinent data and labs:  5/14/18 normal plasma metanephrines  5/31/18 ALD 13.4, renin 0.8   5/28/18 urinary cortisol 351.6, 8 AM cortisol 23 (after dexamethasone), normal metanephrines     6/5/18 ACTH<10, DHEAS 740 (prior to surgery)  7/30/18  DHEAS <0.5, cortisol AM 19, testosterone <7    7/31/18 aldosterone 7.3  8/10/18 CRH 3 (normal<10)  12/3/18 cortisol PM 36, free T4 0.7, TSH 2.3, Ca 8.6, "  (elevated since 11/18)   12/4/18 ACTH <5   12/6/18  DHEAS <0.5  12/20/18 ferritin 9  2/12/19 androstenedione 0.471, K 3.6, ALD 7.7 renin 4.4   2/13/19 24 hrs urinary cortisol 130.9   2/18 started on fludrocortisone at 0.1 mg daily   2/26/19 DHEAS <15   2/26/19 9 AM cortisol 7.4, ACTH 23 (5-52).   3/12/19 DHEAS <15  9/24/2019 potassium 4, sodium 137, renin 10.6, mitotane 18.8, cortisol 25.8, ACTH < 5, aldosterone 4.9, free T4 1.17, TSH 0.07, DHEAS<15 (lab work was done at 9:30 AM, outside records).  6/8/20 DHEAS <15   12/9/20 corrected calcium 8.8, TSH 0.25, free T4 1.03, DHEAS <15, ACTH,10      Ref. Range 12/25/2019 09:00 1/23/2020 08:00 7/21/2020 10:30 10/29/2020 11:56 12/15/2020 11:45   Cortisol Free Urine Latest Ref Range: <=45.0 ug/d 213.4 (H) 119.0 (H) 100.6 (H) Not Applicable 86.8 (H)     The recent chest, abdomen and pelvis CT from 12/7/2020 showed no evidence of recurrent or metastatic disease.  He did reveal a nonobstructing left kidney stones and adjacent left ovarian cystic structuresPatience Almonte underwent bariatric surgery in 6/2016. Pre-surgery weight 235 lb, inga post-surgery 135 lbs. Her weight in 4/2017 was 139 lbs. In 3/2018, her weight was 154 lbs. It was 157 lbs prior to adrenalectomy.  Prior labs revealed malabsorption and the patient continues to take 500 mg calcium twice a day and a multivitamin which contains vitamin D (unchanged over the last year). The patient does not drink milk and she occasionally has yogurt or cheese.    Past Medical History   Past Medical History:   Diagnosis Date     Adrenal cortex cancer, right (H) 6/30/2018    Resected 6/25/18, Dr. Mansfield.     Adrenal mass (H)      Chocolate cyst of ovary 2011     History of miscarriage      Malignant neoplasm of cortex of right adrenal gland (H) 6/25/2018    EOTD; 4/29/19.  Check in May. SCP started.  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) June 25, 2018    Cushing's syndrome, secondary to #1 (300 mcg/24 hr); Rx hydrocortisone 20 + 10 post op   Post operative defect right hemidiaphragm with ?worsening right effussion, not present preop   Currently on mitotane, 500 mg, BID x 1 week + hydrocortisone     MTHFR mutation (H)    Diverticulosis  Obstructive sleep apnea which resolved following gastric bypass  GERD  Iron deficiency  Anemia  Ovarian cyst   IUD inserted on 19    Past Surgical History   Past Surgical History:   Procedure Laterality Date     ADRENALECTOMY Right 2018    Procedure: ADRENALECTOMY;  Right Adrenalectomy,  Embolize Liver , Anesthesia Block;  Surgeon: Warren Mansfield MD;  Location: [facility] OR     ADRENALECTOMY        SECTION      twice      SECTION      2     EMBOLECTOMY ABDOMEN N/A 2018    Procedure: EMBOLECTOMY ABDOMEN;;  Surgeon: Ector Mcintyre MD;  Location: [facility] OR     EXTRACORPOREAL SHOCK WAVE LITHOTRIPSY (ESWL)       GASTRIC BYPASS         Current Medications    Current Outpatient Medications:      acetaminophen (TYLENOL) 325 MG tablet, Take 2 tablets (650 mg) by mouth every 4 hours as needed for other (multimodal surgical pain management along with NSAIDS and opioid medication as indicated based on pain control and physical function.), Disp: 150 tablet, Rfl: 0     buPROPion (WELLBUTRIN) 100 MG tablet, TAKE 1 TABLET (100 MG TOTAL) BY MOUTH 2 (TWO) TIMES A DAY., Disp: , Rfl: 3     calcium carbonate 500 mg, elemental, (OSCAL) 500 MG tablet, Take 1 tablet (500 mg) by mouth 2 times daily, Disp: 180 tablet, Rfl: 3     cholecalciferol (VITAMIN D-1000 MAX ST) 1000 units TABS, Take 2,000 Units by mouth every morning , Disp: , Rfl:      clonazePAM (KLONOPIN) 0.5 MG tablet, Take 1 tablet (0.5 mg) by mouth 2 times daily as needed for anxiety, Disp: 60 tablet, Rfl: 0     fludrocortisone (FLORINEF) 0.1 MG tablet, Take 1 tablet (0.1 mg) by mouth daily, Disp: 90 tablet, Rfl: 1     FLUoxetine (PROZAC) 40 MG capsule,  Take 60 mg by mouth daily , Disp: , Rfl:      gabapentin (NEURONTIN) 300 MG capsule, Take 1 capsule (300 mg) by mouth 3 times daily, Disp: 270 capsule, Rfl: 1     hydrocortisone (CORTEF) 10 MG tablet, Take 3 tablets (30 MG) every morning and take 2 tablets (20 MG) by mouth every day at 2 PM. Additional as directed., Disp: 500 tablet, Rfl: 3     levothyroxine (SYNTHROID/LEVOTHROID) 125 MCG tablet, Take 1 tablet (125 mcg) by mouth daily, Disp: 90 tablet, Rfl: 3     mitotane (LYSODREN) 500 MG tablet, Take 2-4 tablets (1,000-2,000 mg) by mouth 3 times daily 2000mg AM, 1000mg afternoon and 2000mg PM, Disp: 360 tablet, Rfl: 0     prochlorperazine (COMPAZINE) 5 MG tablet, Take 1 tablet (5 mg) by mouth every 8 hours as needed for nausea or vomiting, Disp: 90 tablet, Rfl: 3     traZODone (DESYREL) 50 MG tablet, Take 2.5 tablets (125 mg) by mouth At Bedtime for 28 days, THEN 2 tablets (100 mg) At Bedtime for 28 days, THEN 1.5 tablets (75 mg) At Bedtime for 28 days, THEN 1 tablet (50 mg) At Bedtime for 28 days., Disp: 196 tablet, Rfl: 0     UNABLE TO FIND, medical cannabis (Patient's own supply. Not a prescription), Disp: , Rfl:      diazepam (VALIUM) 5 MG tablet, Take 1 tablet (5 mg) by mouth once as needed for anxiety (MRI claustrophobia management) (Patient not taking: Reported on 10/28/2020), Disp: 2 tablet, Rfl: 0     EPINEPHrine (EPIPEN/ADRENACLICK/OR ANY BX GENERIC EQUIV) 0.3 MG/0.3ML injection 2-pack, As directed for bee stings, Disp: , Rfl:      ferrous fumarate 65 mg, [place]. FE,-Vitamin C 125 mg (VITRON-C)  MG TABS tablet, Take 1 tablet by mouth 3 times daily, Disp: 270 tablet, Rfl: 1     Multiple Vitamins-Calcium (ONE-A-DAY WOMENS PO), Take 2 tablets by mouth every morning , Disp: , Rfl:      ondansetron (ZOFRAN) 8 MG tablet, Take 1 tablet (8 mg) by mouth every 8 hours as needed for nausea (Patient not taking: Reported on 12/17/2020), Disp: 30 tablet, Rfl: 0  Iron supplement daily for 6 months     Family  History   Problem Relation Age of Onset     Depression Paternal Grandmother        Social History   with 2 children, 7 and 10-year-old. She denies smoking, drinking alcohol or using illicit drugs. Occupation: disabled.     Review of Systems   10 point review of systems negative unless specified below.  Fatigue - has improved   She had some dyshagia - improved   Palpitations present 1-2 times a week - with no reason and lasting a few seconds - feels like she is going to faint and has to sit; the heart rate is not fast or irregular  Episodes of loose BMs; the nausea is present a couple of times a day and she has been voming 1-2 times a week   The hands tremor is improved.   Increased thirst and urine has a weird odor  Muscle pain in her legs    The headaches have been less frequent              Vital Signs     Previous Weights:    Wt Readings from Last 10 Encounters:   12/09/20 72 kg (158 lb 11.2 oz)   10/28/20 71.2 kg (157 lb)   09/16/20 69.4 kg (153 lb 1.6 oz)   08/05/20 69.3 kg (152 lb 12.8 oz)   06/08/20 68.4 kg (150 lb 14.4 oz)   02/26/20 70 kg (154 lb 6.4 oz)   01/22/20 61.2 kg (135 lb)   12/17/19 67.4 kg (148 lb 9.6 oz)   12/02/19 67.5 kg (148 lb 14.4 oz)   11/14/19 65.8 kg (145 lb)        There were no vitals taken for this visit.    Physical Exam  General Appearance: alert, no distress noted   Eyes: grossly normal to inspection, conjunctivae and sclerae normal, no lid lag or stare   Respiratory: no audible wheeze, cough, or visible cyanosis.  No visible retractions or increased work of breathing.  Able to speak fully in complete sentences.  Neurological: Cranial nerves grossly intact, mentation intact and speech normal; no tremor of the outstretched hands   Skin: no lesions on exposed skin   Psychological: mentation appears normal, affect normal, judgement and insight intact, normal speech and appearance well-groomed    Lab Results  I reviewed prior lab results documented in Epic.  TSH   Date Value Ref  Range Status   12/09/2020 0.25 (L) 0.40 - 4.00 mU/L Final   10/28/2020 0.20 (L) 0.40 - 4.00 mU/L Final   09/16/2020 0.63 0.40 - 4.00 mU/L Final   08/05/2020 0.75 0.40 - 4.00 mU/L Final   07/16/2020 0.72 0.40 - 4.00 mU/L Final     T4 Free   Date Value Ref Range Status   12/09/2020 1.03 0.76 - 1.46 ng/dL Final   10/28/2020 1.06 0.76 - 1.46 ng/dL Final   09/16/2020 1.02 0.76 - 1.46 ng/dL Final   08/05/2020 0.96 0.76 - 1.46 ng/dL Final   07/16/2020 0.85 0.76 - 1.46 ng/dL Final       Assessment     1. 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 Ki-67 revealed an increased proliferation index of approximately 20%.   The tumor was functional and presented with high cortisol and DHEAS levels.  Post surgery/radiation and while undergoing treatment with mitotane (started in July 2018), the DHEAS has remained undetectable.  Adjusting the dose of mitotane to therapeutic levels remains challenging, due to side effects.  Currently, the patient tolerates fairly well a dose of 4000 mg daily.    She might benefit from enrolling in a research study and having the urinary metabolomics checked. Advised to contact Dr. Nath, at [facility].     2.  Primary adrenal insufficiency, mitotane-induced  She has been having intermittent episodes of mild hypokalemia and the renin level has been lowish.  The suppressed ACTH and the elevated urinary cortisol suggest appropriate hydrocortisone replacement.  I doubt she requires Florinef and discussed about the option of taking half a tablet daily. She has the urinary cortisol checked frequently.  She might require to have the dose of hydrocortisone increased, as the dose of mitotane is titrated up.    3. Hypothyroidism, presumed to be secondary to mitotane induced suppression of TSH   The free T4 has not significantly changed on recent lab work.  She is going to have it rechecked in January, and we are  going to consider increasing the dose of levothyroxine at that time, the goal of achieving a free T4 in the upper end of normal range.    Orders Placed This Encounter   Procedures     Renin activity

## 2020-12-22 ENCOUNTER — PATIENT OUTREACH (OUTPATIENT)
Dept: ONCOLOGY | Facility: CLINIC | Age: 35
End: 2020-12-22

## 2020-12-22 LAB
COLLECT DURATION TIME UR: 24 H
CORTIS 24H UR-MRATE: 83 MCG/24 H (ref 3.5–45)
CORTISONE 24H UR-MRATE: 128 MCG/24 H (ref 17–129)
LAB SCANNED RESULT: NORMAL
SPECIMEN VOL 24H UR: 1800 ML

## 2020-12-22 NOTE — PROGRESS NOTES
"Received call from Suzy stating she reviewed her latest Mitotane level of 8.0 with Dr. Huertas and he is recommending she increase her mitotane \"by one tablet to a total of 11 tablets daily.\" Dr. Cintron notified of new dosing instructions.     She requested the results or her last six mitotane levels be faxed to Dr. Huertas.       Date Received in Clinic: n/a  Name/Type of Form: Mitotane level results  Date Completed: 12/22/2020  Copy Mailed to patient: no  Disposition of Form: Faxed to Dr. Huertas @ [phone]        LC LaneN, RN  RN Care Coordinator  [facility]       "

## 2020-12-28 ENCOUNTER — TELEPHONE (OUTPATIENT)
Dept: ONCOLOGY | Facility: CLINIC | Age: 35
End: 2020-12-28

## 2020-12-28 NOTE — TELEPHONE ENCOUNTER
Oral Chemotherapy Monitoring Program    Placed call to patient to confirm dosing of Mitotane prior to new refill. Suzy reported she is taking 2000mg in AM, 1500 in afternoon and 2000mg at PM. She had no other concerns at this time.    Eliane Mccartney  Pharmacy Intern  [facility]  [phone]

## 2020-12-29 ENCOUNTER — MYC MEDICAL ADVICE (OUTPATIENT)
Dept: ONCOLOGY | Facility: CLINIC | Age: 35
End: 2020-12-29

## 2021-01-05 RX ORDER — MEPERIDINE HYDROCHLORIDE 25 MG/ML
25 INJECTION INTRAMUSCULAR; INTRAVENOUS; SUBCUTANEOUS EVERY 30 MIN PRN
Status: CANCELLED | OUTPATIENT
Start: 2021-01-05

## 2021-01-05 RX ORDER — DIPHENHYDRAMINE HYDROCHLORIDE 50 MG/ML
50 INJECTION INTRAMUSCULAR; INTRAVENOUS
Status: CANCELLED
Start: 2021-01-05

## 2021-01-05 RX ORDER — EPINEPHRINE 1 MG/ML
0.3 INJECTION, SOLUTION, CONCENTRATE INTRAVENOUS EVERY 5 MIN PRN
Status: CANCELLED | OUTPATIENT
Start: 2021-01-05

## 2021-01-05 RX ORDER — ALBUTEROL SULFATE 0.83 MG/ML
2.5 SOLUTION RESPIRATORY (INHALATION)
Status: CANCELLED | OUTPATIENT
Start: 2021-01-05

## 2021-01-05 RX ORDER — ALBUTEROL SULFATE 90 UG/1
1-2 AEROSOL, METERED RESPIRATORY (INHALATION)
Status: CANCELLED
Start: 2021-01-05

## 2021-01-05 RX ORDER — SODIUM CHLORIDE 9 MG/ML
1000 INJECTION, SOLUTION INTRAVENOUS CONTINUOUS PRN
Status: CANCELLED
Start: 2021-01-05

## 2021-01-05 RX ORDER — EPINEPHRINE 0.3 MG/.3ML
0.3 INJECTION SUBCUTANEOUS EVERY 5 MIN PRN
Status: CANCELLED | OUTPATIENT
Start: 2021-01-05

## 2021-01-06 DIAGNOSIS — C74.01 MALIGNANT NEOPLASM OF CORTEX OF RIGHT ADRENAL GLAND (H): Primary | ICD-10-CM

## 2021-01-20 DIAGNOSIS — C74.01 MALIGNANT NEOPLASM OF CORTEX OF RIGHT ADRENAL GLAND (H): ICD-10-CM

## 2021-01-20 DIAGNOSIS — C74.01 ADRENAL CORTEX CANCER, RIGHT (H): ICD-10-CM

## 2021-01-20 LAB
ALBUMIN SERPL-MCNC: 3 G/DL (ref 3.4–5)
ALP SERPL-CCNC: 80 U/L (ref 40–150)
ALT SERPL W P-5'-P-CCNC: 18 U/L (ref 0–50)
ANION GAP SERPL CALCULATED.3IONS-SCNC: 3 MMOL/L (ref 3–14)
AST SERPL W P-5'-P-CCNC: 18 U/L (ref 0–45)
BASOPHILS # BLD AUTO: 0 10E9/L (ref 0–0.2)
BASOPHILS NFR BLD AUTO: 0.3 %
BILIRUB SERPL-MCNC: 0.2 MG/DL (ref 0.2–1.3)
BUN SERPL-MCNC: 10 MG/DL (ref 7–30)
CALCIUM SERPL-MCNC: 7.6 MG/DL (ref 8.5–10.1)
CHLORIDE SERPL-SCNC: 104 MMOL/L (ref 94–109)
CHOLEST SERPL-MCNC: 172 MG/DL
CO2 SERPL-SCNC: 29 MMOL/L (ref 20–32)
CREAT SERPL-MCNC: 0.68 MG/DL (ref 0.52–1.04)
DIFFERENTIAL METHOD BLD: NORMAL
EOSINOPHIL # BLD AUTO: 0.1 10E9/L (ref 0–0.7)
EOSINOPHIL NFR BLD AUTO: 0.8 %
ERYTHROCYTE [DISTWIDTH] IN BLOOD BY AUTOMATED COUNT: 13.6 % (ref 10–15)
GFR SERPL CREATININE-BSD FRML MDRD: >90 ML/MIN/{1.73_M2}
GLUCOSE SERPL-MCNC: 93 MG/DL (ref 70–99)
HCT VFR BLD AUTO: 45.1 % (ref 35–47)
HDLC SERPL-MCNC: 106 MG/DL
HGB BLD-MCNC: 14.8 G/DL (ref 11.7–15.7)
LDLC SERPL CALC-MCNC: 23 MG/DL
LYMPHOCYTES # BLD AUTO: 1.5 10E9/L (ref 0.8–5.3)
LYMPHOCYTES NFR BLD AUTO: 24.8 %
MCH RBC QN AUTO: 32.2 PG (ref 26.5–33)
MCHC RBC AUTO-ENTMCNC: 32.8 G/DL (ref 31.5–36.5)
MCV RBC AUTO: 98 FL (ref 78–100)
MISCELLANEOUS TEST: NORMAL
MONOCYTES # BLD AUTO: 0.5 10E9/L (ref 0–1.3)
MONOCYTES NFR BLD AUTO: 7.9 %
NEUTROPHILS # BLD AUTO: 4 10E9/L (ref 1.6–8.3)
NEUTROPHILS NFR BLD AUTO: 66.2 %
NONHDLC SERPL-MCNC: 66 MG/DL
PLATELET # BLD AUTO: 177 10E9/L (ref 150–450)
POTASSIUM SERPL-SCNC: 3.7 MMOL/L (ref 3.4–5.3)
PROT SERPL-MCNC: 6 G/DL (ref 6.8–8.8)
RBC # BLD AUTO: 4.6 10E12/L (ref 3.8–5.2)
SODIUM SERPL-SCNC: 136 MMOL/L (ref 133–144)
T4 FREE SERPL-MCNC: 1.04 NG/DL (ref 0.76–1.46)
TRIGL SERPL-MCNC: 213 MG/DL
TSH SERPL DL<=0.005 MIU/L-ACNC: 0.32 MU/L (ref 0.4–4)
WBC # BLD AUTO: 6.1 10E9/L (ref 4–11)

## 2021-01-20 PROCEDURE — 36415 COLL VENOUS BLD VENIPUNCTURE: CPT | Performed by: INTERNAL MEDICINE

## 2021-01-20 PROCEDURE — 82627 DEHYDROEPIANDROSTERONE: CPT | Performed by: INTERNAL MEDICINE

## 2021-01-20 PROCEDURE — 80061 LIPID PANEL: CPT | Performed by: INTERNAL MEDICINE

## 2021-01-20 PROCEDURE — 82024 ASSAY OF ACTH: CPT | Performed by: INTERNAL MEDICINE

## 2021-01-20 PROCEDURE — 99000 SPECIMEN HANDLING OFFICE-LAB: CPT | Performed by: INTERNAL MEDICINE

## 2021-01-20 PROCEDURE — 84244 ASSAY OF RENIN: CPT | Mod: 90 | Performed by: INTERNAL MEDICINE

## 2021-01-20 PROCEDURE — 80050 GENERAL HEALTH PANEL: CPT | Performed by: INTERNAL MEDICINE

## 2021-01-20 PROCEDURE — 84439 ASSAY OF FREE THYROXINE: CPT | Performed by: INTERNAL MEDICINE

## 2021-01-21 LAB
ACTH PLAS-MCNC: 56 PG/ML
DHEA-S SERPL-MCNC: <15 UG/DL (ref 35–430)

## 2021-01-25 LAB — RENIN PLAS-CCNC: 2.1 NG/ML/HR

## 2021-01-26 ENCOUNTER — VIRTUAL VISIT (OUTPATIENT)
Dept: ONCOLOGY | Facility: CLINIC | Age: 36
End: 2021-01-26
Attending: INTERNAL MEDICINE
Payer: COMMERCIAL

## 2021-01-26 DIAGNOSIS — C74.01 MALIGNANT NEOPLASM OF CORTEX OF RIGHT ADRENAL GLAND (H): Primary | ICD-10-CM

## 2021-01-26 PROCEDURE — 999N001193 HC VIDEO/TELEPHONE VISIT; NO CHARGE

## 2021-01-26 PROCEDURE — 99214 OFFICE O/P EST MOD 30 MIN: CPT | Mod: 95 | Performed by: INTERNAL MEDICINE

## 2021-01-26 NOTE — LETTER
"    1/26/2021         RE: Suzy Rodríguez  [address]  [facility] [address]        Dear Colleague,    Thank you for referring your patient, Suzy Rodríguez, to the [facility]. Please see a copy of my visit note below.    Suzy is a 35 year old who is being evaluated via a billable video visit.      How would you like to obtain your AVS? MyChart  If the video visit is dropped, the invitation should be resent by: Text to cell phone: [phone]  Will anyone else be joining your video visit? No     Vitals - Patient Reported  Weight (Patient Reported): 71.7 kg (158 lb)  Height (Patient Reported): 162.6 cm (5' 4\")  BMI (Based on Pt Reported Ht/Wt): 27.12  Pain Score: Moderate Pain (4)  Pain Loc: Other - see comment(Pelvic pain)    Miya Emery CMA January 26, 2021  3:59 PM            Video-Visit Details  Type of service:  Video Visit  Video duration: 20 mins  Originating Location (pt. Location): Home  Distant Location (provider location):  [facility]   Platform used for Video Visit: Milton Cintron MD    MEDICAL ONCOLOGY CLINIC NOTE    REFERRING PROVIDER: Warren Mansfield MD from [facility].      REASON FOR CURRENT VISIT: Follow-up for adrenocortical carcinoma, currently on adjuvant mitotane.    HISTORY OF PRESENT ILLNESS: Ms. 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     Suzy has been on mitotane [phone] since around Christmas 2020. Anorexia stable, gaining weight and has Cushingoid features with elevated BP for past several months. Nausea is stable and using compazine 10mg QAM+QPM. No more bloating or lose BM. Fatigue improved slightly, no slurred speech but does have \"loss of words\". Involuntary tremors are stable; similar to what she had with the higher doses " "in the past.     Anxiety significant but she has not had any further episodes of sudden-onset palpitations, pre-syncope (without CP/SOA/neuro symptoms) which resolved within a minute or so with deep breathing and sitting down. Also has significant insomnia due to hydrcort? Legs with long-standing dull aching pain which is helped by gabapentin 900mg TID.    Saw Dr. Huertas in Oct 2020 and continues hydrocortisone 30mg QAM, 20mg early PM; fluodrocortisone 0.1mg/day and levothyroxine to 125 mcg/day and then 2 tablets on Sunday. She also follows with Dr. Veena Jaquez in our endocrinology division.     Denies any fever, diarrhea, dizziness, wt loss. No clinical signs/symptoms of recurrence although she has non-specific MSK RU chest pain and SANCHEZ.    ONCOLOGIC HISTORY:  1. 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   - CT abdomen and pelvis stone protocol without contrast 5 8017 showed \"9.2 x 8 cm heterogeneous right upper quadrant mass with small foci of calcification within it which is likely arising from the adrenal gland though inseparable from liver and upper pole of right kidney. It is incompletely evaluated on this noncontrast study. 3 x 2.6 cm mass right lobe of liver on image #85 also incompletely evaluated. 6 x 4 x 4 mm upper third left ureteral obstructing stone with mild to moderate secondary hydronephrosis. Collection of stones at lower pole left kidney extending over an area of approximately 6 x 7 x 4 mm. Additional nonobstructing 1 mm stone upper pole left kidney. 2 mm nonobstructing stone noted upper pole right kidney with no hydronephrosis on the right. Postop change consistent with gastric bypass. Noncontrast images of spleen, left adrenal gland, gallbladder, and pancreas unremarkable. No aneurysm. No adenopathy.\"  - Seen by Dr. Delvalle at " [facility] clinic. Referred to Dr. Alvino Patel and then Dr. Warren Mansfield.  - Endocrinology team directed workup showed high DHEAS level of 740 pre-surgery.   - Right open adrenalectomy and hepatic mobilization performed by Dr. Warren Mansfield on 6/25/2018. Pathology showed adrenocortical carcinoma measuring 11.3 cm, weighing 413 g with extension into or through the adrenal capsule negative lymphovascular invasion, tumor necrosis present, margins involved by tumor, no regional lymph nodes identified, pathologic stage T2 NX.  pathology review reveals low grade ACC.  - DHEAS normalized postsurgery.   - Adjuvant Mitotane started on 7/24/18. Dose increased to 2000mg TID around 10/23/18 due to persistently low troughs. Held 1/16/19 for two weeks and resumed 1/30 at 1000 mg po BID due to very poor tolerance of higher doses. Highest mitotane level was 10.2 on 2/11/19. Mitotane troughs did not rise above 10 despite increase in dose to 1500 BID and then 1500+2000. Started 2000mg BID on 10/28/20. Increased to [phone] since around Christmas 2020.   - Saw radiation oncology at [facility], radiation oncology at [facility], as well as endocrine oncology (Dr. Huertas) at [facility].   - S/p adjuvant RT by Dr. Monsivais - 5040 cGy over 30 fr (8/24/18-10/6/18).  - 2/11/19: OSH CT C/A/P and MRI brain with contrast - no evidence of disease recurrence.   - 06/08/20, 09/14/20, 12/7/20: CT C/A/P with contrast - AFUA.    REVIEW OF SYSTEMS: 14 point ROS negative other than the symptoms noted above in the HPI.    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 Mansfield.     Adrenal mass (H)      Chocolate cyst of ovary 2011     History of miscarriage      Malignant neoplasm of cortex of right adrenal gland (H) 6/25/2018    EOTD; 4/29/19.  Check in May. SCP started.  Overview:  Overview:    Adrenal  cortical carcinoma, 11.3 cm s/p resection (anterior laporotomy) 2018   Cushing's syndrome, secondary to #1 (300 mcg/24 hr); Rx hydrocortisone 20 + 10 post op   Post operative defect right hemidiaphragm with ?worsening right effussion, not present preop   Currently on mitotane, 500 mg, BID x 1 week + hydrocortisone     MTHFR mutation (H)    MHTFR mutation with h/o mutiple miscarriages.    PAST SURGICAL HISTORY:   Past Surgical History:   Procedure Laterality Date     ADRENALECTOMY Right 2018    Procedure: ADRENALECTOMY;  Right Adrenalectomy,  Embolize Liver , Anesthesia Block;  Surgeon: Warren Mansfield MD;  Location: [facility] OR     ADRENALECTOMY        SECTION      twice      SECTION      2     EMBOLECTOMY ABDOMEN N/A 2018    Procedure: EMBOLECTOMY ABDOMEN;;  Surgeon: Ector Mcintyre MD;  Location: [facility] OR     EXTRACORPOREAL SHOCK WAVE LITHOTRIPSY (ESWL)       GASTRIC BYPASS        SOCIAL HISTORY:   Social History     Tobacco Use     Smoking status: Never Smoker     Smokeless tobacco: Never Used   Substance Use Topics     Alcohol use: Yes     Comment: occ.     Drug use: No     FAMILY HISTORY:   Family History   Problem Relation Age of Onset     Depression Paternal Grandmother      ALLERGIES:   Allergies   Allergen Reactions     Bee Venom Swelling     Ibuprofen Hives and Swelling     CURRENT MEDICATIONS:   Current Outpatient Medications:      acetaminophen (TYLENOL) 325 MG tablet, Take 2 tablets (650 mg) by mouth every 4 hours as needed for other (multimodal surgical pain management along with NSAIDS and opioid medication as indicated based on pain control and physical function.), Disp: 150 tablet, Rfl: 0     buPROPion (WELLBUTRIN) 100 MG tablet, TAKE 1 TABLET (100 MG TOTAL) BY MOUTH 2 (TWO) TIMES A DAY., Disp: , Rfl: 3     calcium carbonate 500 mg, elemental, (OSCAL) 500 MG tablet, Take 1 tablet (500 mg) by mouth 2 times daily, Disp: 180 tablet, Rfl: 3     ferrous  fumarate 65 mg, [place]. FE,-Vitamin C 125 mg (VITRON-C)  MG TABS tablet, Take 1 tablet by mouth 3 times daily, Disp: 270 tablet, Rfl: 1     fludrocortisone (FLORINEF) 0.1 MG tablet, Take 1 tablet (0.1 mg) by mouth daily, Disp: 90 tablet, Rfl: 1     FLUoxetine (PROZAC) 40 MG capsule, Take 60 mg by mouth daily , Disp: , Rfl:      gabapentin (NEURONTIN) 300 MG capsule, Take 1 capsule (300 mg) by mouth 3 times daily, Disp: 270 capsule, Rfl: 1     hydrocortisone (CORTEF) 10 MG tablet, Take 3 tablets (30 MG) every morning and take 2 tablets (20 MG) by mouth every day at 2 PM. Additional as directed., Disp: 500 tablet, Rfl: 3     levothyroxine (SYNTHROID/LEVOTHROID) 125 MCG tablet, Take 1 tablet (125 mcg) by mouth daily, Disp: 90 tablet, Rfl: 3     mitotane (LYSODREN) 500 MG tablet, Take 4 tablets (2,000 mg) by mouth every morning AND 3 tablets (1,500 mg) daily (with lunch) AND 4 tablets (2,000 mg) every evening., Disp: 330 tablet, Rfl: 0     Multiple Vitamins-Calcium (ONE-A-DAY WOMENS PO), Take 2 tablets by mouth every morning , Disp: , Rfl:      ondansetron (ZOFRAN) 8 MG tablet, Take 1 tablet (8 mg) by mouth every 8 hours as needed for nausea, Disp: 30 tablet, Rfl: 0     prochlorperazine (COMPAZINE) 5 MG tablet, Take 1 tablet (5 mg) by mouth every 8 hours as needed for nausea or vomiting, Disp: 90 tablet, Rfl: 3     traZODone (DESYREL) 50 MG tablet, Take 2.5 tablets (125 mg) by mouth At Bedtime for 28 days, THEN 2 tablets (100 mg) At Bedtime for 28 days, THEN 1.5 tablets (75 mg) At Bedtime for 28 days, THEN 1 tablet (50 mg) At Bedtime for 28 days., Disp: 196 tablet, Rfl: 0     UNABLE TO FIND, medical cannabis (Patient's own supply. Not a prescription), Disp: , Rfl:      cholecalciferol (VITAMIN D-1000 MAX ST) 1000 units TABS, Take 2,000 Units by mouth every morning , Disp: , Rfl:      clonazePAM (KLONOPIN) 0.5 MG tablet, Take 1 tablet (0.5 mg) by mouth 2 times daily as needed for anxiety (Patient not taking: Reported  on 1/26/2021), Disp: 60 tablet, Rfl: 0     diazepam (VALIUM) 5 MG tablet, Take 1 tablet (5 mg) by mouth once as needed for anxiety (MRI claustrophobia management) (Patient not taking: Reported on 1/26/2021), Disp: 2 tablet, Rfl: 0     EPINEPHrine (EPIPEN/ADRENACLICK/OR ANY BX GENERIC EQUIV) 0.3 MG/0.3ML injection 2-pack, As directed for bee stings, Disp: , Rfl:     PHYSICAL EXAMINATION:  Vital signs: There were no vitals taken for this visit.   ECOG 2. Limited video exam.   GENERAL:  Young well-nourished woman in a chair in no acute distress.   HEAD AND NECK: No visible masses.  CHEST:  Normal work of breathing.   NEUROLOGIC:  Cranial nerves II-XII grossly intact.    EXTREMITIES: No cyanosis, no clubbing, no edema.   NEUROLOGIC: Alert and fully oriented. Hands and feet are tremulous.   PSYCH: Affect flat, mood slightly anxious/depressed    LABORATORY DATA:   Lab Test 01/20/21  1146 12/09/20  1530 10/28/20  1532   WBC 6.1 7.5 10.1   RBC 4.60 4.40 4.13   HGB 14.8 14.0 13.3   HCT 45.1 42.4 40.8   MCV 98 96 99   MCH 32.2 31.8 32.2   MCHC 32.8 33.0 32.6   RDW 13.6 13.0 13.3    229 145*   NEUTROPHIL 66.2 78.3 84.3    138 140   POTASSIUM 3.7 4.1 3.6   CHLORIDE 104 106 110*   CO2 29 28 26   ANIONGAP 3 4 5   GLC 93 83 85   BUN 10 10 9   CR 0.68 0.72 0.67   SHASHA 7.6* 8.0* 8.1*   PROTTOTAL 6.0* 6.0* 5.7*   ALBUMIN 3.0* 3.0* 3.0*   BILITOTAL 0.2 0.2 0.3   ALKPHOS 80 85 73   AST 18 18 18   ALT 18 22 20     Lab Test 01/20/21  1146 12/09/20  1530 10/28/20  1532 09/16/20  1437 08/05/20  0736   TSH 0.32* 0.25* 0.20* 0.63 0.75   T4 1.04 1.03 1.06 1.02 0.96     Lab Test 01/20/21  1146 12/09/20  1530   CHOL 172 174    106   LDL 23 47   TRIG 213* 107     DHEAS was 740 on 6/5/18, and has been <15 on 7/12/18, 8/20/18, 9/19/18, 10/23/18, 11/27/18, 1/9/19, 5/8/19, 6/18/19, 7/17/19, 11/15/19, 12/12/19, 1/22/20, 3/30/20 and 6/8/20, 7/21/20, 8/5/20, 1/20/21.  Mitotane level (target 14-20): 1.8ng/dl on 8/20/18, 3.5 on  10/25/18, 6.2 on 12/4/18, 8.1 on 1/9/19 and 10.2 on 2/11/19, 14.2 on 6/18/19. 10.8 on 12/12/19.  10 on 1/24/20. 8.9 on 5/14/20. 8.6 on 7/21/20, 8.3 on 8/5/20.   Labs from [facility] 9/24/19 - WBC 4200, ANC 2600, Hb 10.4, Platelets 218K, lytes WNL, creat 0.61, Calcium 8, albumin 3.5, LFTs normal, mitotane level 18.8, cortisol 25.8, ACTH<5, aldosterone 4.9, free T4 1.17, TSH 0.07, DHEAS <15, renin plasma 10.6.     ASSESSMENT AND PLAN: A 35-year-old lady with right-sided functioning adrenocortical carcinoma.     Adrenocortical carcinoma:  - Started on adjuvant mitotane in July 2018 based on her presentation and tumor characteristics including invasion of the adrenal capsule, high mitotic rate, possibly positive margins, and high Ki67, which could result in a rate of recurrence as high as 40%.    - Restaging CT C/A/P from Dec 2020 showed no evidence of disease recurrence. She's ~2.5 years out from surgery and explained that this is an excellent finding as recurrence rates drop quite a bit after year 2.  - She has significant burden of AEs from high dose of mitotane but wants to continue with regimen prescribed by Dr. Hilton at [facility] - she's now at 5.5 grams/day (7223-1519-6818 mg divided doses).   - Will follow-up on mitotane levels from today. Advised to monitor closely.   - Every 6 weekly mitotane level, CBC, comprehensive panel, TSH, FT4, DHEAS, ACTH, periodic cholesterol panel, and 24-hour Urine Free Cortisol due to COVID pandemic.  - Plan to continue adjuvant Mitotane for up to 5 yrs if tolerated.  - Next restaging CT C/A/P in ~3 months.     Headache, slurred speech, abnormal MRI brain:  - Likely due to mitotane but MRI brain with contrast had abnormal basal ganglia signal.   - Was previously referred to Neurology for eval of any alternative etiologies. I don't see a consult note but pt mentioned that she had a visit. She'll send us info via [facility]. Monitor for new/worsening symptoms.    Nausea, chronic:  - Advised on  long-term AEs with compazine use. Avoiding benzos due to potential for addiction.    Endocrinopathies:  - Mgmt per endocrine team at the . Follow-up planned in the next couple months.    Ovarian cysts:  - Due to see Gyn soon as she has pain from cysts. Advised to inform us well in advance of any surgeries as she'll need mitotane interruption and perioperative adrenal steroid replacement titration.     Return to see me in 6 weeks.     BILLIN - 20 mins video; 35 mins including coordination    Benson Cintron M.D.  . Professor of Medicine  Genitourinary Oncology  Division of Hematology, Oncology & Transplantation  [facility]

## 2021-01-26 NOTE — PROGRESS NOTES
"MEDICAL ONCOLOGY CLINIC NOTE    REFERRING PROVIDER: Warren Mansfield MD from [facility].      REASON FOR CURRENT VISIT: Follow-up for adrenocortical carcinoma, currently on adjuvant mitotane.    HISTORY OF PRESENT ILLNESS: Ms. 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     Suzy has been on mitotane [phone] since around Christmas 2020. Anorexia stable, gaining weight and has Cushingoid features with elevated BP for past several months. Nausea is stable and using compazine 10mg QAM+QPM. No more bloating or lose BM. Fatigue improved slightly, no slurred speech but does have \"loss of words\". Involuntary tremors are stable; similar to what she had with the higher doses in the past.     Anxiety significant but she has not had any further episodes of sudden-onset palpitations, pre-syncope (without CP/SOA/neuro symptoms) which resolved within a minute or so with deep breathing and sitting down. Also has significant insomnia due to hydrcort? Legs with long-standing dull aching pain which is helped by gabapentin 900mg TID.    Saw Dr. Huertas in Oct 2020 and continues hydrocortisone 30mg QAM, 20mg early PM; fluodrocortisone 0.1mg/day and levothyroxine to 125 mcg/day and then 2 tablets on Sunday. She also follows with Dr. Veena Jaquez in our endocrinology division.     Denies any fever, diarrhea, dizziness, wt loss. No clinical signs/symptoms of recurrence although she has non-specific MSK RU chest pain and SANCHEZ.    ONCOLOGIC HISTORY:  1. 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   - CT abdomen and pelvis stone protocol without contrast 5 8018 showed \"9.2 x 8 cm heterogeneous right upper " "quadrant mass with small foci of calcification within it which is likely arising from the adrenal gland though inseparable from liver and upper pole of right kidney. It is incompletely evaluated on this noncontrast study. 3 x 2.6 cm mass right lobe of liver on image #85 also incompletely evaluated. 6 x 4 x 4 mm upper third left ureteral obstructing stone with mild to moderate secondary hydronephrosis. Collection of stones at lower pole left kidney extending over an area of approximately 6 x 7 x 4 mm. Additional nonobstructing 1 mm stone upper pole left kidney. 2 mm nonobstructing stone noted upper pole right kidney with no hydronephrosis on the right. Postop change consistent with gastric bypass. Noncontrast images of spleen, left adrenal gland, gallbladder, and pancreas unremarkable. No aneurysm. No adenopathy.\"  - Seen by Dr. Delvalle at [facility] clinic. Referred to Dr. Alvino Patel and then Dr. Warren Mansfield.  - Endocrinology team directed workup showed high DHEAS level of 740 pre-surgery.   - Right open adrenalectomy and hepatic mobilization performed by Dr. Warren Mansfield on 6/25/2018. Pathology showed adrenocortical carcinoma measuring 11.3 cm, weighing 413 g with extension into or through the adrenal capsule negative lymphovascular invasion, tumor necrosis present, margins involved by tumor, no regional lymph nodes identified, pathologic stage T2 NX.  pathology review reveals low grade ACC.  - DHEAS normalized postsurgery.   - Adjuvant Mitotane started on 7/24/18. Dose increased to 2000mg TID around 10/23/18 due to persistently low troughs. Held 1/16/19 for two weeks and resumed 1/30 at 1000 mg po BID due to very poor tolerance of higher doses. Highest mitotane level was 10.2 on 2/11/19. Mitotane troughs did not rise above 10 despite increase in dose to 1500 BID and then 1500+2000. Started 2000mg BID on 10/28/20. Increased to [id number] since around Christmas 2020.   - Saw radiation " oncology at [facility], radiation oncology at [facility], as well as endocrine oncology (Dr. Huertas) at [facility].   - S/p adjuvant RT by Dr. Monsivais - 5040 cGy over 30 fr (18-10/6/18).  - 19: OSH CT C/A/P and MRI brain with contrast - no evidence of disease recurrence.   - 20, 20, 20: CT C/A/P with contrast - AFUA.    REVIEW OF SYSTEMS: 14 point ROS negative other than the symptoms noted above in the HPI.    PAST MEDICAL HISTORY:  Past Medical History:   Diagnosis Date     Adrenal cortex cancer, right (H) 2018    Resected 18, Dr. Mansfield.     Adrenal mass (H)      Chocolate cyst of ovary      History of miscarriage      Malignant neoplasm of cortex of right adrenal gland (H) 2018    EOTD; 19.  Check in May. SCP started.  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) 2018   Cushing's syndrome, secondary to #1 (300 mcg/24 hr); Rx hydrocortisone 20 + 10 post op   Post operative defect right hemidiaphragm with ?worsening right effussion, not present preop   Currently on mitotane, 500 mg, BID x 1 week + hydrocortisone     MTHFR mutation (H)    MHTFR mutation with h/o mutiple miscarriages.    PAST SURGICAL HISTORY:   Past Surgical History:   Procedure Laterality Date     ADRENALECTOMY Right 2018    Procedure: ADRENALECTOMY;  Right Adrenalectomy,  Embolize Liver , Anesthesia Block;  Surgeon: Warren Mansfield MD;  Location: [facility] OR     ADRENALECTOMY        SECTION      twice      SECTION      2     EMBOLECTOMY ABDOMEN N/A 2018    Procedure: EMBOLECTOMY ABDOMEN;;  Surgeon: Ector Mcintyre MD;  Location: [facility] OR     EXTRACORPOREAL SHOCK WAVE LITHOTRIPSY (ESWL)       GASTRIC BYPASS        SOCIAL HISTORY:   Social History     Tobacco Use     Smoking status: Never Smoker     Smokeless tobacco: Never Used   Substance Use Topics     Alcohol use: Yes      Comment: occ.     Drug use: No     FAMILY HISTORY:   Family History   Problem Relation Age of Onset     Depression Paternal Grandmother      ALLERGIES:   Allergies   Allergen Reactions     Bee Venom Swelling     Ibuprofen Hives and Swelling     CURRENT MEDICATIONS:   Current Outpatient Medications:      acetaminophen (TYLENOL) 325 MG tablet, Take 2 tablets (650 mg) by mouth every 4 hours as needed for other (multimodal surgical pain management along with NSAIDS and opioid medication as indicated based on pain control and physical function.), Disp: 150 tablet, Rfl: 0     buPROPion (WELLBUTRIN) 100 MG tablet, TAKE 1 TABLET (100 MG TOTAL) BY MOUTH 2 (TWO) TIMES A DAY., Disp: , Rfl: 3     calcium carbonate 500 mg, elemental, (OSCAL) 500 MG tablet, Take 1 tablet (500 mg) by mouth 2 times daily, Disp: 180 tablet, Rfl: 3     ferrous fumarate 65 mg, [place]. FE,-Vitamin C 125 mg (VITRON-C)  MG TABS tablet, Take 1 tablet by mouth 3 times daily, Disp: 270 tablet, Rfl: 1     fludrocortisone (FLORINEF) 0.1 MG tablet, Take 1 tablet (0.1 mg) by mouth daily, Disp: 90 tablet, Rfl: 1     FLUoxetine (PROZAC) 40 MG capsule, Take 60 mg by mouth daily , Disp: , Rfl:      gabapentin (NEURONTIN) 300 MG capsule, Take 1 capsule (300 mg) by mouth 3 times daily, Disp: 270 capsule, Rfl: 1     hydrocortisone (CORTEF) 10 MG tablet, Take 3 tablets (30 MG) every morning and take 2 tablets (20 MG) by mouth every day at 2 PM. Additional as directed., Disp: 500 tablet, Rfl: 3     levothyroxine (SYNTHROID/LEVOTHROID) 125 MCG tablet, Take 1 tablet (125 mcg) by mouth daily, Disp: 90 tablet, Rfl: 3     mitotane (LYSODREN) 500 MG tablet, Take 4 tablets (2,000 mg) by mouth every morning AND 3 tablets (1,500 mg) daily (with lunch) AND 4 tablets (2,000 mg) every evening., Disp: 330 tablet, Rfl: 0     Multiple Vitamins-Calcium (ONE-A-DAY WOMENS PO), Take 2 tablets by mouth every morning , Disp: , Rfl:      ondansetron (ZOFRAN) 8 MG tablet, Take 1  tablet (8 mg) by mouth every 8 hours as needed for nausea, Disp: 30 tablet, Rfl: 0     prochlorperazine (COMPAZINE) 5 MG tablet, Take 1 tablet (5 mg) by mouth every 8 hours as needed for nausea or vomiting, Disp: 90 tablet, Rfl: 3     traZODone (DESYREL) 50 MG tablet, Take 2.5 tablets (125 mg) by mouth At Bedtime for 28 days, THEN 2 tablets (100 mg) At Bedtime for 28 days, THEN 1.5 tablets (75 mg) At Bedtime for 28 days, THEN 1 tablet (50 mg) At Bedtime for 28 days., Disp: 196 tablet, Rfl: 0     UNABLE TO FIND, medical cannabis (Patient's own supply. Not a prescription), Disp: , Rfl:      cholecalciferol (VITAMIN D-1000 MAX ST) 1000 units TABS, Take 2,000 Units by mouth every morning , Disp: , Rfl:      clonazePAM (KLONOPIN) 0.5 MG tablet, Take 1 tablet (0.5 mg) by mouth 2 times daily as needed for anxiety (Patient not taking: Reported on 1/26/2021), Disp: 60 tablet, Rfl: 0     diazepam (VALIUM) 5 MG tablet, Take 1 tablet (5 mg) by mouth once as needed for anxiety (MRI claustrophobia management) (Patient not taking: Reported on 1/26/2021), Disp: 2 tablet, Rfl: 0     EPINEPHrine (EPIPEN/ADRENACLICK/OR ANY BX GENERIC EQUIV) 0.3 MG/0.3ML injection 2-pack, As directed for bee stings, Disp: , Rfl:     PHYSICAL EXAMINATION:  Vital signs: There were no vitals taken for this visit.   ECOG 2. Limited video exam.   GENERAL:  Young well-nourished woman in a chair in no acute distress.   HEAD AND NECK: No visible masses.  CHEST:  Normal work of breathing.   NEUROLOGIC:  Cranial nerves II-XII grossly intact.    EXTREMITIES: No cyanosis, no clubbing, no edema.   NEUROLOGIC: Alert and fully oriented. Hands and feet are tremulous.   PSYCH: Affect flat, mood slightly anxious/depressed    LABORATORY DATA:   Lab Test 01/20/21  1146 12/09/20  1530 10/28/20  1532   WBC 6.1 7.5 10.1   RBC 4.60 4.40 4.13   HGB 14.8 14.0 13.3   HCT 45.1 42.4 40.8   MCV 98 96 99   MCH 32.2 31.8 32.2   MCHC 32.8 33.0 32.6   RDW 13.6 13.0 13.3    229  145*   NEUTROPHIL 66.2 78.3 84.3    138 140   POTASSIUM 3.7 4.1 3.6   CHLORIDE 104 106 110*   CO2 29 28 26   ANIONGAP 3 4 5   GLC 93 83 85   BUN 10 10 9   CR 0.68 0.72 0.67   SHASHA 7.6* 8.0* 8.1*   PROTTOTAL 6.0* 6.0* 5.7*   ALBUMIN 3.0* 3.0* 3.0*   BILITOTAL 0.2 0.2 0.3   ALKPHOS 80 85 73   AST 18 18 18   ALT 18 22 20     Lab Test 01/20/21  1146 12/09/20  1530 10/28/20  1532 09/16/20  1437 08/05/20  0736   TSH 0.32* 0.25* 0.20* 0.63 0.75   T4 1.04 1.03 1.06 1.02 0.96     Lab Test 01/20/21  1146 12/09/20  1530   CHOL 172 174    106   LDL 23 47   TRIG 213* 107     DHEAS was 740 on 6/5/18, and has been <15 on 7/12/18, 8/20/18, 9/19/18, 10/23/18, 11/27/18, 1/9/19, 5/8/19, 6/18/19, 7/17/19, 11/15/19, 12/12/19, 1/22/20, 3/30/20 and 6/8/20, 7/21/20, 8/5/20, 1/20/21.  Mitotane level (target 14-20): 1.8ng/dl on 8/20/18, 3.5 on 10/25/18, 6.2 on 12/4/18, 8.1 on 1/9/19 and 10.2 on 2/11/19, 14.2 on 6/18/19. 10.8 on 12/12/19.  10 on 1/24/20. 8.9 on 5/14/20. 8.6 on 7/21/20, 8.3 on 8/5/20.   Labs from [facility] 9/24/19 - WBC 4200, ANC 2600, Hb 10.4, Platelets 218K, lytes WNL, creat 0.61, Calcium 8, albumin 3.5, LFTs normal, mitotane level 18.8, cortisol 25.8, ACTH<5, aldosterone 4.9, free T4 1.17, TSH 0.07, DHEAS <15, renin plasma 10.6.     ASSESSMENT AND PLAN: A 35-year-old lady with right-sided functioning adrenocortical carcinoma.     Adrenocortical carcinoma:  - Started on adjuvant mitotane in July 2018 based on her presentation and tumor characteristics including invasion of the adrenal capsule, high mitotic rate, possibly positive margins, and high Ki67, which could result in a rate of recurrence as high as 40%.    - Restaging CT C/A/P from Dec 2020 showed no evidence of disease recurrence. She's ~2.5 years out from surgery and explained that this is an excellent finding as recurrence rates drop quite a bit after year 2.  - She has significant burden of AEs from high dose of mitotane but wants to continue with regimen  prescribed by Dr. Hilton at [facility] - she's now at 5.5 grams/day (3600-1553-3180 mg divided doses).   - Will follow-up on mitotane levels from today. Advised to monitor closely.   - Every 6 weekly mitotane level, CBC, comprehensive panel, TSH, FT4, DHEAS, ACTH, periodic cholesterol panel, and 24-hour Urine Free Cortisol due to COVID pandemic.  - Plan to continue adjuvant Mitotane for up to 5 yrs if tolerated.  - Next restaging CT C/A/P in ~3 months.     Headache, slurred speech, abnormal MRI brain:  - Likely due to mitotane but MRI brain with contrast had abnormal basal ganglia signal.   - Was previously referred to Neurology for eval of any alternative etiologies. I don't see a consult note but pt mentioned that she had a visit. She'll send us info via [employer]. Monitor for new/worsening symptoms.    Nausea, chronic:  - Advised on long-term AEs with compazine use. Avoiding benzos due to potential for addiction.    Endocrinopathies:  - Mgmt per endocrine team at the . Follow-up planned in the next couple months.    Ovarian cysts:  - Due to see Gyn soon as she has pain from cysts. Advised to inform us well in advance of any surgeries as she'll need mitotane interruption and perioperative adrenal steroid replacement titration.     Return to see me in 6 weeks.     BILLIN - 20 mins video; 35 mins including coordination    Benson Cintron M.D.  . Professor of Medicine  Genitourinary Oncology  Division of Hematology, Oncology & Transplantation  [facility]

## 2021-01-26 NOTE — PROGRESS NOTES
"Suzy is a 35 year old who is being evaluated via a billable video visit.      How would you like to obtain your AVS? MyChart  If the video visit is dropped, the invitation should be resent by: Text to cell phone: [phone]  Will anyone else be joining your video visit? No     Vitals - Patient Reported  Weight (Patient Reported): 71.7 kg (158 lb)  Height (Patient Reported): 162.6 cm (5' 4\")  BMI (Based on Pt Reported Ht/Wt): 27.12  Pain Score: Moderate Pain (4)  Pain Loc: Other - see comment(Pelvic pain)    Miya Emery CMA January 26, 2021  3:59 PM            Video-Visit Details  Type of service:  Video Visit  Video duration: 20 mins  Originating Location (pt. Location): Home  Distant Location (provider location):  [facility]   Platform used for Video Visit: Milton Cintron MD  "

## 2021-01-28 DIAGNOSIS — C74.01 MALIGNANT NEOPLASM OF CORTEX OF RIGHT ADRENAL GLAND (H): ICD-10-CM

## 2021-01-28 PROCEDURE — 82530 CORTISOL FREE: CPT | Mod: 90 | Performed by: INTERNAL MEDICINE

## 2021-01-28 PROCEDURE — 82542 COL CHROMOTOGRAPHY QUAL/QUAN: CPT | Mod: 90 | Performed by: INTERNAL MEDICINE

## 2021-01-28 PROCEDURE — 99000 SPECIMEN HANDLING OFFICE-LAB: CPT | Performed by: INTERNAL MEDICINE

## 2021-01-29 ENCOUNTER — RECORDS - HEALTHEAST (OUTPATIENT)
Dept: ADMINISTRATIVE | Facility: OTHER | Age: 36
End: 2021-01-29

## 2021-01-29 DIAGNOSIS — E27.40 ADRENAL INSUFFICIENCY (H): ICD-10-CM

## 2021-01-29 RX ORDER — HYDROCORTISONE 10 MG/1
TABLET ORAL
Qty: 550 TABLET | Refills: 3 | Status: SHIPPED | OUTPATIENT
Start: 2021-01-29 | End: 2021-06-07

## 2021-02-01 LAB
COLLECT DURATION TIME SPEC: 24 H
CORTIS F 24H UR HPLC-MCNC: 45.1 UG/L
CORTIS F 24H UR-MRATE: 102.6 UG/D
CORTIS F/CREAT 24H UR: 79.12 UG/G CRT
CREAT 24H UR-MRATE: 1297 MG/D (ref 700–1600)
CREAT UR-MCNC: 57 MG/DL
IMP & REVIEW OF LAB RESULTS: ABNORMAL
SPECIMEN VOL ?TM UR: 2275 ML

## 2021-02-02 LAB
COLLECT DURATION TIME UR: 24 H
CORTIS 24H UR-MRATE: 66 MCG/24 H (ref 3.5–45)
CORTISONE 24H UR-MRATE: 86 MCG/24 H (ref 17–129)
SPECIMEN VOL 24H UR: 2275 ML

## 2021-02-02 RX ORDER — MEPERIDINE HYDROCHLORIDE 25 MG/ML
25 INJECTION INTRAMUSCULAR; INTRAVENOUS; SUBCUTANEOUS EVERY 30 MIN PRN
Status: CANCELLED | OUTPATIENT
Start: 2021-02-04

## 2021-02-02 RX ORDER — EPINEPHRINE 0.3 MG/.3ML
0.3 INJECTION SUBCUTANEOUS EVERY 5 MIN PRN
Status: CANCELLED | OUTPATIENT
Start: 2021-02-04

## 2021-02-02 RX ORDER — DIPHENHYDRAMINE HYDROCHLORIDE 50 MG/ML
50 INJECTION INTRAMUSCULAR; INTRAVENOUS
Status: CANCELLED
Start: 2021-02-04

## 2021-02-02 RX ORDER — ALBUTEROL SULFATE 0.83 MG/ML
2.5 SOLUTION RESPIRATORY (INHALATION)
Status: CANCELLED | OUTPATIENT
Start: 2021-02-04

## 2021-02-02 RX ORDER — ALBUTEROL SULFATE 90 UG/1
1-2 AEROSOL, METERED RESPIRATORY (INHALATION)
Status: CANCELLED
Start: 2021-02-04

## 2021-02-02 RX ORDER — EPINEPHRINE 1 MG/ML
0.3 INJECTION, SOLUTION, CONCENTRATE INTRAVENOUS EVERY 5 MIN PRN
Status: CANCELLED | OUTPATIENT
Start: 2021-02-04

## 2021-02-02 RX ORDER — SODIUM CHLORIDE 9 MG/ML
1000 INJECTION, SOLUTION INTRAVENOUS CONTINUOUS PRN
Status: CANCELLED
Start: 2021-02-04

## 2021-02-04 DIAGNOSIS — C74.01 MALIGNANT NEOPLASM OF CORTEX OF RIGHT ADRENAL GLAND (H): Primary | ICD-10-CM

## 2021-02-08 DIAGNOSIS — C74.01 MALIGNANT NEOPLASM OF CORTEX OF RIGHT ADRENAL GLAND (H): ICD-10-CM

## 2021-02-08 DIAGNOSIS — E16.2 HYPOGLYCEMIA: Primary | ICD-10-CM

## 2021-02-09 LAB — LAB SCANNED RESULT: NORMAL

## 2021-02-14 ENCOUNTER — HEALTH MAINTENANCE LETTER (OUTPATIENT)
Age: 36
End: 2021-02-14

## 2021-02-21 DIAGNOSIS — E03.8 SECONDARY HYPOTHYROIDISM: ICD-10-CM

## 2021-02-22 ENCOUNTER — MYC MEDICAL ADVICE (OUTPATIENT)
Dept: FAMILY MEDICINE | Facility: CLINIC | Age: 36
End: 2021-02-22

## 2021-02-23 RX ORDER — LEVOTHYROXINE SODIUM 125 UG/1
125 TABLET ORAL DAILY
Qty: 90 TABLET | Refills: 3 | Status: SHIPPED | OUTPATIENT
Start: 2021-02-23 | End: 2022-02-10

## 2021-02-23 NOTE — TELEPHONE ENCOUNTER
" LEVOTHYROXINE SODIUM 125MCG TABS  Last Written Prescription Date:  2/24/20  Last Fill Quantity: 90,   # refills: 3  Last Office Visit : 12/21/20  Future Office visit:  None    Routing refill request to provider for review/approval because:  Continue same dosage?   Thyroid labs done 1/20/2021 / Dr Cintron Med Onc  01/20/21   T4 Free 1.04   TSH 0.32*         Last ENDO visit note 12/21/20 excerpted, \". Hypothyroidism, presumed to be secondary to mitotane induced suppression of TSH   The free T4 has not significantly changed on recent lab work.  She is going to have it rechecked in January, and we are going to consider increasing the dose of levothyroxine at that time, the goal of achieving a free T4 in the upper end of normal range.\"    "

## 2021-02-24 ENCOUNTER — OFFICE VISIT (OUTPATIENT)
Dept: FAMILY MEDICINE | Facility: CLINIC | Age: 36
End: 2021-02-24
Payer: COMMERCIAL

## 2021-02-24 VITALS
RESPIRATION RATE: 16 BRPM | BODY MASS INDEX: 26.02 KG/M2 | TEMPERATURE: 94.4 F | HEIGHT: 64 IN | SYSTOLIC BLOOD PRESSURE: 100 MMHG | HEART RATE: 72 BPM | DIASTOLIC BLOOD PRESSURE: 64 MMHG | WEIGHT: 152.38 LBS

## 2021-02-24 DIAGNOSIS — R51.9 NONINTRACTABLE HEADACHE, UNSPECIFIED CHRONICITY PATTERN, UNSPECIFIED HEADACHE TYPE: Primary | ICD-10-CM

## 2021-02-24 DIAGNOSIS — E27.1 PRIMARY ADRENAL INSUFFICIENCY (H): ICD-10-CM

## 2021-02-24 DIAGNOSIS — S06.0X0A CONCUSSION WITHOUT LOSS OF CONSCIOUSNESS, INITIAL ENCOUNTER: ICD-10-CM

## 2021-02-24 PROBLEM — F19.982 DRUG-INDUCED INSOMNIA (H): Status: ACTIVE | Noted: 2021-02-24

## 2021-02-24 PROCEDURE — 99214 OFFICE O/P EST MOD 30 MIN: CPT | Performed by: FAMILY MEDICINE

## 2021-02-24 RX ORDER — TRAMADOL HYDROCHLORIDE 50 MG/1
50 TABLET ORAL EVERY 6 HOURS PRN
Qty: 15 TABLET | Refills: 0 | Status: SHIPPED | OUTPATIENT
Start: 2021-02-24 | End: 2021-02-27

## 2021-02-24 ASSESSMENT — MIFFLIN-ST. JEOR: SCORE: 1366.17

## 2021-02-24 NOTE — PROGRESS NOTES
Assessment/Plan:    Suzy Rodríguez is a 35 year old female presenting for:    Nonintractable headache, unspecified chronicity pattern, unspecified headache type  - traMADol (ULTRAM) 50 MG tablet  Dispense: 15 tablet; Refill: 0    Concussion without loss of consciousness, initial encounter  The patient is slowly improving.  Discussed this with her.  Would recommend continuing to practice brain rest.  Recommended bluelight glasses.  Short course of tramadol sent to the pharmacy to help with headaches.  Discussed signs and symptoms that would necessitate further evaluation or trip to the emergency department.    Primary adrenal insufficiency (H)  Continue to follow with oncology for her adrenal cancer    There are no discontinued medications.        Chief Complaint:  RECHECK        Subjective:   Suzy Rodríguez is a very pleasant 35-year-old female presenting to the clinic today with concerns over a possible concussion.  Patient was sleepwalking (which she does not generally do) is on February 9.  She states that she awoke after she fell and hit the left side of her head.  She was able to get up and get back in bed and fall back asleep.  She does not think she lost consciousness.    When she woke the next morning she had a left-sided headache and some pain and pressure behind her left eye.  This has persisted however lessened over the last 2 weeks.  She also noted some left eye blurriness however she recently got new contacts and is wondering if this could be the culprit.  She does think this is improving as well.    Her main concern today is she continues to have a left-sided headache.  Although it is improving it is making it difficult to take care of her children and complete her daily tasks.  She has been taking Tylenol although it has not been helping that much.  She is not able to take ibuprofen.      12 point review of systems completed and negative except for what has been described above    History    Smoking Status     Never Smoker   Smokeless Tobacco     Never Used         Current Outpatient Medications:      acetaminophen (TYLENOL) 325 MG tablet, Take 2 tablets (650 mg) by mouth every 4 hours as needed for other (multimodal surgical pain management along with NSAIDS and opioid medication as indicated based on pain control and physical function.), Disp: 150 tablet, Rfl: 0     blood glucose (NO BRAND SPECIFIED) test strip, Use to test blood sugar as directed., Disp: 50 strip, Rfl: 0     blood glucose monitoring (NO BRAND SPECIFIED) meter device kit, Use to test blood sugar a the time you experience symptoms of a low BG., Disp: 1 kit, Rfl: 0     buPROPion (WELLBUTRIN) 100 MG tablet, TAKE 1 TABLET (100 MG TOTAL) BY MOUTH 2 (TWO) TIMES A DAY., Disp: , Rfl: 3     calcium carbonate 500 mg, elemental, (OSCAL) 500 MG tablet, Take 1 tablet (500 mg) by mouth 2 times daily, Disp: 180 tablet, Rfl: 3     cholecalciferol (VITAMIN D-1000 MAX ST) 1000 units TABS, Take 2,000 Units by mouth every morning , Disp: , Rfl:      clonazePAM (KLONOPIN) 0.5 MG tablet, Take 1 tablet (0.5 mg) by mouth 2 times daily as needed for anxiety, Disp: 60 tablet, Rfl: 0     diazepam (VALIUM) 5 MG tablet, Take 1 tablet (5 mg) by mouth once as needed for anxiety (MRI claustrophobia management), Disp: 2 tablet, Rfl: 0     EPINEPHrine (EPIPEN/ADRENACLICK/OR ANY BX GENERIC EQUIV) 0.3 MG/0.3ML injection 2-pack, As directed for bee stings, Disp: , Rfl:      ferrous fumarate 65 mg, [place]. FE,-Vitamin C 125 mg (VITRON-C)  MG TABS tablet, Take 1 tablet by mouth 3 times daily, Disp: 270 tablet, Rfl: 1     fludrocortisone (FLORINEF) 0.1 MG tablet, Take 1 tablet (0.1 mg) by mouth daily, Disp: 90 tablet, Rfl: 1     FLUoxetine (PROZAC) 40 MG capsule, Take 60 mg by mouth daily , Disp: , Rfl:      gabapentin (NEURONTIN) 300 MG capsule, Take 1 capsule (300 mg) by mouth 3 times daily, Disp: 270 capsule, Rfl: 1     hydrocortisone (CORTEF) 10 MG tablet, Take 3  "tablets (30 MG) every morning and take 2 tablets (20 MG) by mouth every day at 2 PM. Additional as directed., Disp: 550 tablet, Rfl: 3     levothyroxine (SYNTHROID/LEVOTHROID) 125 MCG tablet, Take 1 tablet (125 mcg) by mouth daily, Disp: 90 tablet, Rfl: 3     mitotane (LYSODREN) 500 MG tablet, Take 4 tablets (2,000 mg) by mouth every morning AND 3 tablets (1,500 mg) daily (with lunch) AND 4 tablets (2,000 mg) every evening., Disp: 330 tablet, Rfl: 0     Multiple Vitamins-Calcium (ONE-A-DAY WOMENS PO), Take 2 tablets by mouth every morning , Disp: , Rfl:      ondansetron (ZOFRAN) 8 MG tablet, Take 1 tablet (8 mg) by mouth every 8 hours as needed for nausea, Disp: 30 tablet, Rfl: 0     prochlorperazine (COMPAZINE) 5 MG tablet, Take 1 tablet (5 mg) by mouth every 8 hours as needed for nausea or vomiting, Disp: 90 tablet, Rfl: 3     traMADol (ULTRAM) 50 MG tablet, Take 1 tablet (50 mg) by mouth every 6 hours as needed for severe pain, Disp: 15 tablet, Rfl: 0     UNABLE TO FIND, medical cannabis (Patient's own supply. Not a prescription), Disp: , Rfl:      traZODone (DESYREL) 50 MG tablet, Take 2.5 tablets (125 mg) by mouth At Bedtime for 28 days, THEN 2 tablets (100 mg) At Bedtime for 28 days, THEN 1.5 tablets (75 mg) At Bedtime for 28 days, THEN 1 tablet (50 mg) At Bedtime for 28 days., Disp: 196 tablet, Rfl: 0      Objective:  Vitals:    02/24/21 1309   BP: 100/64   Pulse: 72   Resp: 16   Temp: 94.4  F (34.7  C)   TempSrc: Tympanic   Weight: 69.1 kg (152 lb 6 oz)   Height: 1.626 m (5' 4\")       Body mass index is 26.16 kg/m .    Vital signs reviewed and stable  General: No acute distress  Psych: Appropriate affect  HEENT: moist mucous membranes, pupils equal, round, reactive to light and accomodation, tympanic membranes are pearly grey bilaterally  Lymph: no cervical or supraclavicular lymphadenopathy  Cardiovascular: regular rate and rhythm with no murmur  Pulmonary: clear to auscultation bilaterally with no " wheeze  Abdomen: soft, non tender, non distended with normo-active bowel sounds  Extremities: warm and well perfused with no edema  Skin: warm and dry with no rash  Neurologic: Cranial nerves II through XII are intact, 5 out of 5 strength in upper and lower extremities       This note has been dictated and transcribed using voice recognition software.   Any errors in transcription are unintentional and inherent to the software.

## 2021-03-02 DIAGNOSIS — C74.01 MALIGNANT NEOPLASM OF CORTEX OF RIGHT ADRENAL GLAND (H): Primary | ICD-10-CM

## 2021-03-09 DIAGNOSIS — C74.01 MALIGNANT NEOPLASM OF CORTEX OF RIGHT ADRENAL GLAND (H): Primary | ICD-10-CM

## 2021-03-09 DIAGNOSIS — C74.01 MALIGNANT NEOPLASM OF CORTEX OF RIGHT ADRENAL GLAND (H): ICD-10-CM

## 2021-03-10 ENCOUNTER — MYC MEDICAL ADVICE (OUTPATIENT)
Dept: FAMILY MEDICINE | Facility: CLINIC | Age: 36
End: 2021-03-10

## 2021-03-10 DIAGNOSIS — F19.982 DRUG-INDUCED INSOMNIA (H): ICD-10-CM

## 2021-03-10 DIAGNOSIS — G47.00 INSOMNIA, UNSPECIFIED TYPE: Primary | ICD-10-CM

## 2021-03-10 RX ORDER — TRAZODONE HYDROCHLORIDE 50 MG/1
50 TABLET, FILM COATED ORAL AT BEDTIME
Qty: 4 TABLET | Refills: 0 | Status: SHIPPED | OUTPATIENT
Start: 2021-03-10 | End: 2021-06-01

## 2021-03-10 RX ORDER — TRAZODONE HYDROCHLORIDE 50 MG/1
50 TABLET, FILM COATED ORAL AT BEDTIME
Qty: 4 TABLET | Refills: 0 | Status: SHIPPED | OUTPATIENT
Start: 2021-03-10 | End: 2021-03-10

## 2021-03-16 ENCOUNTER — IMMUNIZATION (OUTPATIENT)
Dept: NURSING | Facility: CLINIC | Age: 36
End: 2021-03-16
Payer: COMMERCIAL

## 2021-03-16 ENCOUNTER — COMMUNICATION - HEALTHEAST (OUTPATIENT)
Dept: FAMILY MEDICINE | Facility: CLINIC | Age: 36
End: 2021-03-16

## 2021-03-16 ENCOUNTER — ANCILLARY PROCEDURE (OUTPATIENT)
Dept: CT IMAGING | Facility: CLINIC | Age: 36
End: 2021-03-16
Attending: INTERNAL MEDICINE
Payer: COMMERCIAL

## 2021-03-16 ENCOUNTER — TELEPHONE (OUTPATIENT)
Dept: FAMILY MEDICINE | Facility: CLINIC | Age: 36
End: 2021-03-16

## 2021-03-16 DIAGNOSIS — C74.01 MALIGNANT NEOPLASM OF CORTEX OF RIGHT ADRENAL GLAND (H): ICD-10-CM

## 2021-03-16 DIAGNOSIS — F41.1 GENERALIZED ANXIETY DISORDER: ICD-10-CM

## 2021-03-16 DIAGNOSIS — F41.9 ANXIETY: ICD-10-CM

## 2021-03-16 PROCEDURE — 71260 CT THORAX DX C+: CPT | Performed by: RADIOLOGY

## 2021-03-16 PROCEDURE — 0001A PR COVID VAC PFIZER DIL RECON 30 MCG/0.3 ML IM: CPT

## 2021-03-16 PROCEDURE — 74177 CT ABD & PELVIS W/CONTRAST: CPT | Performed by: RADIOLOGY

## 2021-03-16 PROCEDURE — 91300 PR COVID VAC PFIZER DIL RECON 30 MCG/0.3 ML IM: CPT

## 2021-03-16 RX ORDER — GABAPENTIN 300 MG/1
900 CAPSULE ORAL 3 TIMES DAILY
Qty: 810 CAPSULE | Refills: 1 | Status: SHIPPED | OUTPATIENT
Start: 2021-03-16 | End: 2021-03-18

## 2021-03-16 RX ORDER — IOPAMIDOL 755 MG/ML
93 INJECTION, SOLUTION INTRAVASCULAR ONCE
Status: COMPLETED | OUTPATIENT
Start: 2021-03-16 | End: 2021-03-16

## 2021-03-16 RX ADMIN — IOPAMIDOL 93 ML: 755 INJECTION, SOLUTION INTRAVASCULAR at 12:16

## 2021-03-16 NOTE — TELEPHONE ENCOUNTER
Reason for Call:  Other prescription    Detailed comments: Pharmacy needs updated script for gabapentin.  They are reporting that gabapentin script is not correct.  Suzy states that she takes 3 capsules three x daily - current script states take 1 capsules three x daily.   Please review and send new script if appropriate.  Thank you..Brittany Hunt    Phone Number Patient can be reached at: Other phone number:  [phone]    Best Time: any time    Can we leave a detailed message on this number? YES    Call taken on 3/16/2021 at 1:41 PM by Brittany Hunt

## 2021-03-17 ENCOUNTER — APPOINTMENT (OUTPATIENT)
Dept: LAB | Facility: CLINIC | Age: 36
End: 2021-03-17
Attending: INTERNAL MEDICINE
Payer: COMMERCIAL

## 2021-03-17 ENCOUNTER — ONCOLOGY VISIT (OUTPATIENT)
Dept: ONCOLOGY | Facility: CLINIC | Age: 36
End: 2021-03-17
Attending: INTERNAL MEDICINE
Payer: COMMERCIAL

## 2021-03-17 ENCOUNTER — RECORDS - HEALTHEAST (OUTPATIENT)
Dept: ADMINISTRATIVE | Facility: OTHER | Age: 36
End: 2021-03-17

## 2021-03-17 VITALS
DIASTOLIC BLOOD PRESSURE: 77 MMHG | HEART RATE: 86 BPM | WEIGHT: 153.2 LBS | OXYGEN SATURATION: 98 % | SYSTOLIC BLOOD PRESSURE: 118 MMHG | TEMPERATURE: 98 F | RESPIRATION RATE: 16 BRPM | BODY MASS INDEX: 26.3 KG/M2

## 2021-03-17 DIAGNOSIS — F19.982 DRUG-INDUCED INSOMNIA (H): Primary | ICD-10-CM

## 2021-03-17 DIAGNOSIS — C74.01 MALIGNANT NEOPLASM OF CORTEX OF RIGHT ADRENAL GLAND (H): ICD-10-CM

## 2021-03-17 LAB
ALBUMIN SERPL-MCNC: 3 G/DL (ref 3.4–5)
ALP SERPL-CCNC: 79 U/L (ref 40–150)
ALT SERPL W P-5'-P-CCNC: 25 U/L (ref 0–50)
ANION GAP SERPL CALCULATED.3IONS-SCNC: 7 MMOL/L (ref 3–14)
AST SERPL W P-5'-P-CCNC: 19 U/L (ref 0–45)
BASOPHILS # BLD AUTO: 0 10E9/L (ref 0–0.2)
BASOPHILS NFR BLD AUTO: 0.4 %
BILIRUB SERPL-MCNC: 0.2 MG/DL (ref 0.2–1.3)
BUN SERPL-MCNC: 7 MG/DL (ref 7–30)
CALCIUM SERPL-MCNC: 8 MG/DL (ref 8.5–10.1)
CHLORIDE SERPL-SCNC: 108 MMOL/L (ref 94–109)
CHOLEST SERPL-MCNC: 166 MG/DL
CO2 SERPL-SCNC: 25 MMOL/L (ref 20–32)
CREAT SERPL-MCNC: 0.67 MG/DL (ref 0.52–1.04)
DIFFERENTIAL METHOD BLD: NORMAL
EOSINOPHIL # BLD AUTO: 0.1 10E9/L (ref 0–0.7)
EOSINOPHIL NFR BLD AUTO: 1.5 %
ERYTHROCYTE [DISTWIDTH] IN BLOOD BY AUTOMATED COUNT: 13.8 % (ref 10–15)
GFR SERPL CREATININE-BSD FRML MDRD: >90 ML/MIN/{1.73_M2}
GLUCOSE SERPL-MCNC: 116 MG/DL (ref 70–99)
HCT VFR BLD AUTO: 44.8 % (ref 35–47)
HDLC SERPL-MCNC: 104 MG/DL
HGB BLD-MCNC: 14.7 G/DL (ref 11.7–15.7)
IMM GRANULOCYTES # BLD: 0 10E9/L (ref 0–0.4)
IMM GRANULOCYTES NFR BLD: 0.2 %
LDLC SERPL CALC-MCNC: 25 MG/DL
LYMPHOCYTES # BLD AUTO: 1.2 10E9/L (ref 0.8–5.3)
LYMPHOCYTES NFR BLD AUTO: 26.2 %
MCH RBC QN AUTO: 31.9 PG (ref 26.5–33)
MCHC RBC AUTO-ENTMCNC: 32.8 G/DL (ref 31.5–36.5)
MCV RBC AUTO: 97 FL (ref 78–100)
MISCELLANEOUS TEST: NORMAL
MONOCYTES # BLD AUTO: 0.2 10E9/L (ref 0–1.3)
MONOCYTES NFR BLD AUTO: 5.2 %
NEUTROPHILS # BLD AUTO: 3.1 10E9/L (ref 1.6–8.3)
NEUTROPHILS NFR BLD AUTO: 66.5 %
NONHDLC SERPL-MCNC: 62 MG/DL
NRBC # BLD AUTO: 0 10*3/UL
NRBC BLD AUTO-RTO: 0 /100
PLATELET # BLD AUTO: 186 10E9/L (ref 150–450)
POTASSIUM SERPL-SCNC: 3 MMOL/L (ref 3.4–5.3)
PROT SERPL-MCNC: 6.3 G/DL (ref 6.8–8.8)
RBC # BLD AUTO: 4.61 10E12/L (ref 3.8–5.2)
SODIUM SERPL-SCNC: 140 MMOL/L (ref 133–144)
T4 FREE SERPL-MCNC: 0.82 NG/DL (ref 0.76–1.46)
TRIGL SERPL-MCNC: 185 MG/DL
TSH SERPL DL<=0.005 MIU/L-ACNC: 0.18 MU/L (ref 0.4–4)
WBC # BLD AUTO: 4.7 10E9/L (ref 4–11)

## 2021-03-17 PROCEDURE — G0463 HOSPITAL OUTPT CLINIC VISIT: HCPCS

## 2021-03-17 PROCEDURE — 82627 DEHYDROEPIANDROSTERONE: CPT | Performed by: INTERNAL MEDICINE

## 2021-03-17 PROCEDURE — 84999 UNLISTED CHEMISTRY PROCEDURE: CPT | Performed by: INTERNAL MEDICINE

## 2021-03-17 PROCEDURE — 80375 DRUG/SUBSTANCE NOS 1-3: CPT | Performed by: INTERNAL MEDICINE

## 2021-03-17 PROCEDURE — 84439 ASSAY OF FREE THYROXINE: CPT | Performed by: INTERNAL MEDICINE

## 2021-03-17 PROCEDURE — 82024 ASSAY OF ACTH: CPT | Performed by: INTERNAL MEDICINE

## 2021-03-17 PROCEDURE — 36415 COLL VENOUS BLD VENIPUNCTURE: CPT

## 2021-03-17 PROCEDURE — 99215 OFFICE O/P EST HI 40 MIN: CPT | Mod: GC | Performed by: INTERNAL MEDICINE

## 2021-03-17 PROCEDURE — 80053 COMPREHEN METABOLIC PANEL: CPT | Performed by: INTERNAL MEDICINE

## 2021-03-17 PROCEDURE — 84443 ASSAY THYROID STIM HORMONE: CPT | Performed by: INTERNAL MEDICINE

## 2021-03-17 PROCEDURE — 85025 COMPLETE CBC W/AUTO DIFF WBC: CPT | Performed by: INTERNAL MEDICINE

## 2021-03-17 PROCEDURE — 80061 LIPID PANEL: CPT | Performed by: INTERNAL MEDICINE

## 2021-03-17 ASSESSMENT — PAIN SCALES - GENERAL: PAINLEVEL: NO PAIN (0)

## 2021-03-17 NOTE — NURSING NOTE
"Oncology Rooming Note    March 17, 2021 11:55 AM   Suzy Rodríguez is a 35 year old female who presents for:    Chief Complaint   Patient presents with     Blood Draw     Labs drawn via  by RN in lab. VS taken.      Oncology Clinic Visit     CARCINOMA. ADRENAL CORTICAL      Initial Vitals: /77 (BP Location: Right arm, Patient Position: Sitting, Cuff Size: Adult Regular)   Pulse 86   Temp 98  F (36.7  C) (Oral)   Resp 16   Wt 69.5 kg (153 lb 3.2 oz)   SpO2 98%   BMI 26.30 kg/m   Estimated body mass index is 26.3 kg/m  as calculated from the following:    Height as of 2/24/21: 1.626 m (5' 4\").    Weight as of this encounter: 69.5 kg (153 lb 3.2 oz). Body surface area is 1.77 meters squared.  No Pain (0) Comment: Data Unavailable   No LMP recorded. (Menstrual status: IUD).  Allergies reviewed: Yes  Medications reviewed: Yes    Medications: Medication refills not needed today.  Pharmacy name entered into [facility]:    [facility] [place] - [address] [facility] [address]  [facility] [place] [place] [facility] [address]  [id number] [place], [place] - [address] AT [facility] [place] [address]    Clinical concerns: No new concerns today  Dr Cintron  was NOT notified.      Miya Emery            "

## 2021-03-17 NOTE — PROGRESS NOTES
MEDICAL ONCOLOGY CLINIC NOTE    REFERRING PROVIDER: Warren Mansfiled MD from [facility] Urologic Oncology clinic.      REASON FOR CURRENT VISIT: Follow-up for adrenocortical carcinoma, currently on adjuvant mitotane.    HISTORY OF PRESENT ILLNESS: Ms. 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     Suzy has been on mitotane [phone] since around Christmas 2020. Weight is now stable, BP normal. Nausea is stable and using compazine 10mg QAM+QPM. She continues to have brain fog, tremors, poor sleep at night. She has intermittent headaches when watching TV and cannot focus for a while. She is wondering if it is related to her concussion that occurred when sleep-walking a few days ago (managed by PCP).     The decreased in trazodone dose is not helping her insomnia. She met with provider at [facility], who discussed probably reducing dose of mitotane, but level has been 14 in Jan. She did not lower dose, waiting for level today. She is wondering if she could come off of mitotane in June when she will be completing 3 years of therapy, primarily due to neurological side effects. Denies any fever, diarrhea, dizziness, wt loss. No clinical signs/symptoms of recurrence although she has non-specific MSK RU chest pain and SANCHEZ.    Anxiety significant but she has not had any further episodes of sudden-onset palpitations, pre-syncope (without CP/SOA/neuro symptoms) which resolved within a minute or so with deep breathing and sitting down. Also has significant insomnia due to hydrcort? Legs with long-standing dull aching pain which is helped by gabapentin 900mg TID.    Continues hydrocortisone 30mg QAM, 20mg early PM; fluodrocortisone 0.1mg/day and levothyroxine to 125 mcg/day and then 2 tablets on Sunday. She also follows with Dr. Veena Jaquez in our endocrinology division.  "    ONCOLOGIC HISTORY:  1. 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   - CT abdomen and pelvis stone protocol without contrast 5 8010 showed \"9.2 x 8 cm heterogeneous right upper quadrant mass with small foci of calcification within it which is likely arising from the adrenal gland though inseparable from liver and upper pole of right kidney. It is incompletely evaluated on this noncontrast study. 3 x 2.6 cm mass right lobe of liver on image #85 also incompletely evaluated. 6 x 4 x 4 mm upper third left ureteral obstructing stone with mild to moderate secondary hydronephrosis. Collection of stones at lower pole left kidney extending over an area of approximately 6 x 7 x 4 mm. Additional nonobstructing 1 mm stone upper pole left kidney. 2 mm nonobstructing stone noted upper pole right kidney with no hydronephrosis on the right. Postop change consistent with gastric bypass. Noncontrast images of spleen, left adrenal gland, gallbladder, and pancreas unremarkable. No aneurysm. No adenopathy.\"  - Seen by Dr. Delvalle at [facility] Urology clinic. Referred to Dr. Alvino Patel and then Dr. Warren Mansfield.  - Endocrinology team directed workup showed high DHEAS level of 740 pre-surgery.   - Right open adrenalectomy and hepatic mobilization performed by Dr. Warren Mansfield on 6/25/2018. Pathology showed adrenocortical carcinoma measuring 11.3 cm, weighing 413 g with extension into or through the adrenal capsule negative lymphovascular invasion, tumor necrosis present, margins involved by tumor, no regional lymph nodes identified, pathologic stage T2 NX.  pathology review reveals low grade ACC.  - DHEAS normalized postsurgery.   - Adjuvant Mitotane started on 7/24/18. Dose increased to 2000mg TID around 10/23/18 due to persistently low troughs. Held " 19 for two weeks and resumed  at 1000 mg po BID due to very poor tolerance of higher doses. Highest mitotane level was 10.2 on 19. Mitotane troughs did not rise above 10 despite increase in dose to 1500 BID and then 1500+2000. Started 2000mg BID on 10/28/20. Increased to [phone] since around 2020.   - Saw radiation oncology at [facility], radiation oncology at [facility], as well as endocrine oncology (Dr. Huertas) at [facility].   - S/p adjuvant RT by Dr. Monsivais - 5040 cGy over 30 fr (18-10/6/18).  - 19: OSH CT C/A/P and MRI brain with contrast - no evidence of disease recurrence.   - 20, 20, 20, 3/16/2021: CT C/A/P with contrast - AFUA.    REVIEW OF SYSTEMS: 14 point ROS negative other than the symptoms noted above in the HPI.    PAST MEDICAL HISTORY:  Past Medical History:   Diagnosis Date     Adrenal cortex cancer, right (H) 2018    Resected 18, Dr. Mansfield.     Adrenal mass (H)      Chocolate cyst of ovary      History of miscarriage      Malignant neoplasm of cortex of right adrenal gland (H) 2018    EOTD; 19.  Check in May. SCP started.  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) 2018   Cushing's syndrome, secondary to #1 (300 mcg/24 hr); Rx hydrocortisone 20 + 10 post op   Post operative defect right hemidiaphragm with ?worsening right effussion, not present preop   Currently on mitotane, 500 mg, BID x 1 week + hydrocortisone     MTHFR mutation (H)    MHTFR mutation with h/o mutiple miscarriages.    PAST SURGICAL HISTORY:   Past Surgical History:   Procedure Laterality Date     ADRENALECTOMY Right 2018    Procedure: ADRENALECTOMY;  Right Adrenalectomy,  Embolize Liver , Anesthesia Block;  Surgeon: Warren Mansfield MD;  Location: [facility] OR     ADRENALECTOMY        SECTION      twice      SECTION      2      EMBOLECTOMY ABDOMEN N/A 6/25/2018    Procedure: EMBOLECTOMY ABDOMEN;;  Surgeon: Ector Mcintyre MD;  Location: [facility] OR     EXTRACORPOREAL SHOCK WAVE LITHOTRIPSY (ESWL)       GASTRIC BYPASS  2016      SOCIAL HISTORY:   Social History     Tobacco Use     Smoking status: Never Smoker     Smokeless tobacco: Never Used   Substance Use Topics     Alcohol use: Yes     Comment: occ.     Drug use: No     FAMILY HISTORY:   Family History   Problem Relation Age of Onset     Depression Paternal Grandmother      ALLERGIES:   Allergies   Allergen Reactions     Bee Venom Swelling     Ibuprofen Hives and Swelling     CURRENT MEDICATIONS:   Current Outpatient Medications:      acetaminophen (TYLENOL) 325 MG tablet, Take 2 tablets (650 mg) by mouth every 4 hours as needed for other (multimodal surgical pain management along with NSAIDS and opioid medication as indicated based on pain control and physical function.), Disp: 150 tablet, Rfl: 0     blood glucose (NO BRAND SPECIFIED) test strip, Use to test blood sugar as directed., Disp: 50 strip, Rfl: 0     blood glucose monitoring (NO BRAND SPECIFIED) meter device kit, Use to test blood sugar a the time you experience symptoms of a low BG., Disp: 1 kit, Rfl: 0     buPROPion (WELLBUTRIN) 100 MG tablet, TAKE 1 TABLET (100 MG TOTAL) BY MOUTH 2 (TWO) TIMES A DAY., Disp: , Rfl: 3     calcium carbonate 500 mg, elemental, (OSCAL) 500 MG tablet, Take 1 tablet (500 mg) by mouth 2 times daily, Disp: 180 tablet, Rfl: 3     cholecalciferol (VITAMIN D-1000 MAX ST) 1000 units TABS, Take 2,000 Units by mouth every morning , Disp: , Rfl:      clonazePAM (KLONOPIN) 0.5 MG tablet, Take 1 tablet (0.5 mg) by mouth 2 times daily as needed for anxiety, Disp: 60 tablet, Rfl: 0     diazepam (VALIUM) 5 MG tablet, Take 1 tablet (5 mg) by mouth once as needed for anxiety (MRI claustrophobia management), Disp: 2 tablet, Rfl: 0     EPINEPHrine (EPIPEN/ADRENACLICK/OR ANY BX GENERIC EQUIV) 0.3 MG/0.3ML injection  2-pack, As directed for bee stings, Disp: , Rfl:      ferrous fumarate 65 mg, Chitina. FE,-Vitamin C 125 mg (VITRON-C)  MG TABS tablet, Take 1 tablet by mouth 3 times daily, Disp: 270 tablet, Rfl: 1     fludrocortisone (FLORINEF) 0.1 MG tablet, Take 1 tablet (0.1 mg) by mouth daily, Disp: 90 tablet, Rfl: 1     FLUoxetine (PROZAC) 40 MG capsule, Take 60 mg by mouth daily , Disp: , Rfl:      gabapentin (NEURONTIN) 300 MG capsule, Take 3 capsules (900 mg) by mouth 3 times daily, Disp: 810 capsule, Rfl: 1     hydrocortisone (CORTEF) 10 MG tablet, Take 3 tablets (30 MG) every morning and take 2 tablets (20 MG) by mouth every day at 2 PM. Additional as directed., Disp: 550 tablet, Rfl: 3     levothyroxine (SYNTHROID/LEVOTHROID) 125 MCG tablet, Take 1 tablet (125 mcg) by mouth daily, Disp: 90 tablet, Rfl: 3     mitotane (LYSODREN) 500 MG tablet, Take 4 tablets (2,000 mg) by mouth every morning AND 3 tablets (1,500 mg) daily (with lunch) AND 4 tablets (2,000 mg) every evening., Disp: 330 tablet, Rfl: 0     Multiple Vitamins-Calcium (ONE-A-DAY WOMENS PO), Take 2 tablets by mouth every morning , Disp: , Rfl:      ondansetron (ZOFRAN) 8 MG tablet, Take 1 tablet (8 mg) by mouth every 8 hours as needed for nausea, Disp: 30 tablet, Rfl: 0     prochlorperazine (COMPAZINE) 5 MG tablet, Take 1 tablet (5 mg) by mouth every 8 hours as needed for nausea or vomiting, Disp: 90 tablet, Rfl: 3     traZODone (DESYREL) 50 MG tablet, Take 1 tablet (50 mg) by mouth At Bedtime, Disp: 4 tablet, Rfl: 0     UNABLE TO FIND, medical cannabis (Patient's own supply. Not a prescription), Disp: , Rfl:   No current facility-administered medications for this visit.     PHYSICAL EXAMINATION:  Vital signs: /77 (BP Location: Right arm, Patient Position: Sitting, Cuff Size: Adult Regular)   Pulse 86   Temp 98  F (36.7  C) (Oral)   Resp 16   Wt 69.5 kg (153 lb 3.2 oz)   SpO2 98%   BMI 26.30 kg/m     ECOG performance status of 2.   GENERAL: Young  lady, sitting in chair. Appears tremulous, but in no acute distress.  HEENT: No icterus, no pallor. MMM, PERRL, OP clear.   NECK: Supple, no JVD/LAD.  LUNGS: CTAB, normal WOB on RA.  CARDIOVASCULAR: Regular rate and rhythm, no murmurs.   ABDOMEN: Soft, NT, ND, no masses appreciated, normal BS.   EXTREMITIES: No cyanosis, no clubbing, no edema.   NEUROLOGIC: Alert and fully oriented. Hands and feet are tremulous.   PSYCH: Affect flat, mood slightly anxious/depressed    LABORATORY DATA:   Recent Labs   Lab Test 03/17/21  1135 01/20/21  1146 12/09/20  1530   WBC 4.7 6.1 7.5   RBC 4.61 4.60 4.40   HGB 14.7 14.8 14.0   HCT 44.8 45.1 42.4   MCV 97 98 96   MCH 31.9 32.2 31.8   MCHC 32.8 32.8 33.0   RDW 13.8 13.6 13.0    177 229   NEUTROPHIL 66.5 66.2 78.3    136 138   POTASSIUM 3.0* 3.7 4.1   CHLORIDE 108 104 106   CO2 25 29 28   ANIONGAP 7 3 4   * 93 83   BUN 7 10 10   CR 0.67 0.68 0.72   SHASHA 8.0* 7.6* 8.0*   PROTTOTAL 6.3* 6.0* 6.0*   ALBUMIN 3.0* 3.0* 3.0*   BILITOTAL 0.2 0.2 0.2   ALKPHOS 79 80 85   AST 19 18 18   ALT 25 18 22     Lab Test 03/17/21  1135 01/20/21  1146 12/09/20  1530 10/28/20  1532 09/16/20  1437   TSH 0.18* 0.32* 0.25* 0.20* 0.63   T4 0.82 1.04 1.03 1.06 1.02     Lab Test 03/17/21  1135 01/20/21  1146   CHOL 166 172    106   LDL 25 23   TRIG 185* 213*        Ref. Range 1/28/2021 16:35   Cortisol Free Duration Urine Latest Units: h 24   Cortisol Free Urine Latest Ref Range: <=45.0 ug/d 102.6 (H)   Cortisol Free Urine Intrepretation Unknown SEE NOTE   Cortisol Free Urine Random Latest Units: ug/L 45.10   Cortisol ug/g creatinine Latest Units: ug/g CRT 79.12   CORTISOL CORTISONE FREE 24 HOUR URINE Unknown Rpt (A)   Creatinine Urine/Volume Latest Units: mg/dL 57   Creatinine Urine/24hr Latest Ref Range: 700 - 1,600 mg/d 1,297     DHEAS was 740 on 6/5/18, and has been <15 on 7/12/18, 8/20/18, 9/19/18, 10/23/18, 11/27/18, 1/9/19, 5/8/19, 6/18/19, 7/17/19, 11/15/19, 12/12/19,  1/22/20, 3/30/20 and 6/8/20, 7/21/20, 8/5/20, 1/20/21.  Mitotane level (target 14-20): 1.8ng/dl on 8/20/18, 3.5 on 10/25/18, 6.2 on 12/4/18, 8.1 on 1/9/19 and 10.2 on 2/11/19, 14.2 on 6/18/19. 10.8 on 12/12/19.  10 on 1/24/20. 8.9 on 5/14/20. 8.6 on 7/21/20, 8.3 on 8/5/20, 14.0 in 1/2021.  Labs from [facility] [place] 9/24/19 - WBC 4200, ANC 2600, Hb 10.4, Platelets 218K, lytes WNL, creat 0.61, Calcium 8, albumin 3.5, LFTs normal, mitotane level 18.8, cortisol 25.8, ACTH<5, aldosterone 4.9, free T4 1.17, TSH 0.07, DHEAS <15, renin plasma 10.6.     IMAGING DATA:  As above.    ASSESSMENT AND PLAN: A 35-year-old lady with right-sided functioning high-risk adrenocortical carcinoma.     Adrenocortical carcinoma:  - Started on adjuvant mitotane in July 2018 based on her presentation and tumor characteristics including invasion of the adrenal capsule, high mitotic rate, possibly positive margins, and high Ki67, which could result in a rate of recurrence as high as 40%.    - Restaging CT C/A/P 3/16/21 showed no evidence of disease recurrence. She's 2 years 9 months out from surgery and explained that this is an excellent finding as recurrence rates drop quite a bit after year 2.  - She has significant burden of AEs from high dose of mitotane recommended by Dr. Hilton at [facility] - she's now at 5.5 grams/day ([id number] mg divided doses). Offered again to decrease dose/hold Rx to improve tolerance but she wants to see levels from today before making any decisions.   - Advised to monitor closely for AEs.   - Every 6 weekly mitotane level, CBC, comprehensive panel, TSH, FT4, DHEAS, ACTH, periodic cholesterol panel, and 24-hour Urine Free Cortisol due to COVID pandemic.  - Our plan is to continue adjuvant Mitotane for up to 5 yrs if tolerated because of her high-risk disease. However, she was advised by Dr. Huertas's team that she could come off of mitotane at end of 3 years which is June 2021 by PARKER Phillip. We will message Dr Huertas to  clarify this.     Headache, slurred speech, abnormal MRI brain:  - Likely due to mitotane but MRI brain with contrast had abnormal basal ganglia signal.   - Was previously referred to Neurology for eval of any alternative etiologies. I don't see a consult note but pt mentioned that she had a visit. She'll send us info via [facility]. Monitor for new/worsening symptoms.  - She is wondering if her symptoms could be related to concussion. Advised her to follow up with her PCP and discuss possible re-imaging.   - She wants to go back up on trazodone to 150 mg daily. Advised to hold off for now while she is experiencing possible symptoms related to concussion. May take clonopin PRN if needed 1 hour after taking trazodone 75 mg.     Nausea, chronic:  - Advised on long-term AEs with compazine use. Avoiding benzos due to potential for addiction.    Endocrinopathies:  - Mgmt per endocrine team at the . She remains of hydrocortisone 30 mg in AM, 20 mg in PM and synthroid supplementation.     Ovarian cysts:  - Will follow with Gyn. Advised to inform us well in advance of any surgeries as she'll need mitotane interruption and perioperative adrenal steroid replacement titration.     RTC in 1 month with Dr Hernadez.     Patient seen with Dr Cintron.    Sean Perry  [facility] Onc Fellow  [phone]    ATTENDING ATTESTATION NOTE:  I saw the patient with Dr. Perry and discussed all pertinent clinical data including lab, imaging and path reports. The plan above was made under my supervision. I've edited the note to accurately reflect the A/P after my examination and discussion with the patient.     BILLIN     Benson Cintron M.D.  . Professor of Medicine  Genitourinary Oncology  Division of Hematology, Oncology & Transplantation  [facility]

## 2021-03-18 DIAGNOSIS — F41.9 ANXIETY: ICD-10-CM

## 2021-03-18 LAB
ACTH PLAS-MCNC: 63 PG/ML
DHEA-S SERPL-MCNC: <15 UG/DL (ref 35–430)

## 2021-03-18 RX ORDER — GABAPENTIN 300 MG/1
900 CAPSULE ORAL 3 TIMES DAILY
Qty: 810 CAPSULE | Refills: 1 | Status: SHIPPED | OUTPATIENT
Start: 2021-03-18 | End: 2021-09-13

## 2021-03-18 NOTE — TELEPHONE ENCOUNTER
Routing refill request to provider for review/approval because:  Drug not on the [facility] refill protocol     Juanito King RN

## 2021-03-22 NOTE — TELEPHONE ENCOUNTER
RECORDS STATUS - ALL OTHER DIAGNOSIS      RECORDS RECEIVED FROM: [facility]   DATE RECEIVED: 3/22   NOTES STATUS DETAILS   OFFICE NOTE from referring provider Benson Sanchez MD in [facility]: 3/17/21   OFFICE NOTE from medical oncologist     DISCHARGE SUMMARY from hospital     DISCHARGE REPORT from the ER [facility]/ - [facility] Epic:  1/8/19, 7/14/18    [facility]:  5/8/18: Uretal stone w/ Hyrdonephrosis   OPERATIVE REPORT [facility]/ - [facility] Epic:  6/25/18: Right Adrenalectomy    [facility]:  5/11/18: Cystoscopy   MEDICATION LIST [facility] 3/18/21   CLINICAL TRIAL TREATMENTS TO DATE     LABS     PATHOLOGY REPORTS [facility] 6/25/18: Surg Path   ANYTHING RELATED TO DIAGNOSIS Epic 3/17/21   GENONOMIC TESTING     TYPE: Epic 3/17/241   IMAGING (NEED IMAGES & REPORT)     CT SCANS PACS [facility]   MRI PACS [facility]   MAMMO     ULTRASOUND     PET

## 2021-03-23 ENCOUNTER — APPOINTMENT (OUTPATIENT)
Dept: LAB | Facility: CLINIC | Age: 36
End: 2021-03-23
Payer: COMMERCIAL

## 2021-03-25 DIAGNOSIS — C74.01 MALIGNANT NEOPLASM OF CORTEX OF RIGHT ADRENAL GLAND (H): ICD-10-CM

## 2021-03-25 PROCEDURE — 82530 CORTISOL FREE: CPT | Mod: 90 | Performed by: INTERNAL MEDICINE

## 2021-03-25 PROCEDURE — 99000 SPECIMEN HANDLING OFFICE-LAB: CPT | Performed by: INTERNAL MEDICINE

## 2021-03-25 PROCEDURE — 82542 COL CHROMOTOGRAPHY QUAL/QUAN: CPT | Mod: 90 | Performed by: INTERNAL MEDICINE

## 2021-03-25 PROCEDURE — 82530 CORTISOL FREE: CPT | Performed by: INTERNAL MEDICINE

## 2021-03-29 LAB
COLLECT DURATION TIME SPEC: 24 H
CORTIS F 24H UR HPLC-MCNC: 90.4 UG/L
CORTIS F 24H UR-MRATE: 158.2 UG/D
CORTIS F/CREAT 24H UR: 118.95 UG/G CRT
CREAT 24H UR-MRATE: 1330 MG/D (ref 700–1600)
CREAT UR-MCNC: 76 MG/DL
IMP & REVIEW OF LAB RESULTS: ABNORMAL
LAB SCANNED RESULT: NORMAL
SPECIMEN VOL ?TM UR: 1750 ML

## 2021-03-31 LAB
COLLECT DURATION TIME UR: 24 H
CORTIS 24H UR-MRATE: 128 MCG/24 H (ref 3.5–45)
CORTISONE 24H UR-MRATE: 123 MCG/24 H (ref 17–129)
SPECIMEN VOL 24H UR: 1750 ML

## 2021-04-05 RX ORDER — EPINEPHRINE 1 MG/ML
0.3 INJECTION, SOLUTION, CONCENTRATE INTRAVENOUS EVERY 5 MIN PRN
Status: CANCELLED | OUTPATIENT
Start: 2021-04-05

## 2021-04-05 RX ORDER — SODIUM CHLORIDE 9 MG/ML
1000 INJECTION, SOLUTION INTRAVENOUS CONTINUOUS PRN
Status: CANCELLED
Start: 2021-04-05

## 2021-04-05 RX ORDER — MEPERIDINE HYDROCHLORIDE 25 MG/ML
25 INJECTION INTRAMUSCULAR; INTRAVENOUS; SUBCUTANEOUS EVERY 30 MIN PRN
Status: CANCELLED | OUTPATIENT
Start: 2021-04-05

## 2021-04-05 RX ORDER — ALBUTEROL SULFATE 90 UG/1
1-2 AEROSOL, METERED RESPIRATORY (INHALATION)
Status: CANCELLED
Start: 2021-04-05

## 2021-04-05 RX ORDER — DIPHENHYDRAMINE HYDROCHLORIDE 50 MG/ML
50 INJECTION INTRAMUSCULAR; INTRAVENOUS
Status: CANCELLED
Start: 2021-04-05

## 2021-04-05 RX ORDER — EPINEPHRINE 0.3 MG/.3ML
0.3 INJECTION SUBCUTANEOUS EVERY 5 MIN PRN
Status: CANCELLED | OUTPATIENT
Start: 2021-04-05

## 2021-04-05 RX ORDER — ALBUTEROL SULFATE 0.83 MG/ML
2.5 SOLUTION RESPIRATORY (INHALATION)
Status: CANCELLED | OUTPATIENT
Start: 2021-04-05

## 2021-04-06 ENCOUNTER — IMMUNIZATION (OUTPATIENT)
Dept: NURSING | Facility: CLINIC | Age: 36
End: 2021-04-06
Attending: INTERNAL MEDICINE
Payer: COMMERCIAL

## 2021-04-06 PROCEDURE — 0002A PR COVID VAC PFIZER DIL RECON 30 MCG/0.3 ML IM: CPT

## 2021-04-06 PROCEDURE — 91300 PR COVID VAC PFIZER DIL RECON 30 MCG/0.3 ML IM: CPT

## 2021-04-08 DIAGNOSIS — C74.01 MALIGNANT NEOPLASM OF CORTEX OF RIGHT ADRENAL GLAND (H): Primary | ICD-10-CM

## 2021-04-13 ENCOUNTER — APPOINTMENT (OUTPATIENT)
Dept: LAB | Facility: CLINIC | Age: 36
End: 2021-04-13
Attending: INTERNAL MEDICINE
Payer: COMMERCIAL

## 2021-04-13 ENCOUNTER — ONCOLOGY VISIT (OUTPATIENT)
Dept: ONCOLOGY | Facility: CLINIC | Age: 36
End: 2021-04-13
Attending: INTERNAL MEDICINE
Payer: COMMERCIAL

## 2021-04-13 ENCOUNTER — PRE VISIT (OUTPATIENT)
Dept: ONCOLOGY | Facility: CLINIC | Age: 36
End: 2021-04-13

## 2021-04-13 VITALS
WEIGHT: 151.1 LBS | BODY MASS INDEX: 25.94 KG/M2 | TEMPERATURE: 98.1 F | RESPIRATION RATE: 16 BRPM | OXYGEN SATURATION: 99 % | DIASTOLIC BLOOD PRESSURE: 73 MMHG | SYSTOLIC BLOOD PRESSURE: 109 MMHG | HEART RATE: 73 BPM

## 2021-04-13 DIAGNOSIS — C74.01 MALIGNANT NEOPLASM OF CORTEX OF RIGHT ADRENAL GLAND (H): ICD-10-CM

## 2021-04-13 DIAGNOSIS — F51.01 PRIMARY INSOMNIA: Primary | ICD-10-CM

## 2021-04-13 DIAGNOSIS — C74.01 ADRENAL CORTEX CANCER, RIGHT (H): ICD-10-CM

## 2021-04-13 DIAGNOSIS — E27.40 ADRENAL INSUFFICIENCY (H): ICD-10-CM

## 2021-04-13 LAB
ALBUMIN SERPL-MCNC: 2.9 G/DL (ref 3.4–5)
ALP SERPL-CCNC: 82 U/L (ref 40–150)
ALT SERPL W P-5'-P-CCNC: 21 U/L (ref 0–50)
ANION GAP SERPL CALCULATED.3IONS-SCNC: 5 MMOL/L (ref 3–14)
AST SERPL W P-5'-P-CCNC: 15 U/L (ref 0–45)
BASOPHILS # BLD AUTO: 0 10E9/L (ref 0–0.2)
BASOPHILS NFR BLD AUTO: 0.5 %
BILIRUB SERPL-MCNC: 0.2 MG/DL (ref 0.2–1.3)
BUN SERPL-MCNC: 12 MG/DL (ref 7–30)
CALCIUM SERPL-MCNC: 7.8 MG/DL (ref 8.5–10.1)
CHLORIDE SERPL-SCNC: 109 MMOL/L (ref 94–109)
CHOLEST SERPL-MCNC: 186 MG/DL
CO2 SERPL-SCNC: 26 MMOL/L (ref 20–32)
CREAT SERPL-MCNC: 0.88 MG/DL (ref 0.52–1.04)
DIFFERENTIAL METHOD BLD: NORMAL
EOSINOPHIL # BLD AUTO: 0 10E9/L (ref 0–0.7)
EOSINOPHIL NFR BLD AUTO: 0.7 %
ERYTHROCYTE [DISTWIDTH] IN BLOOD BY AUTOMATED COUNT: 13 % (ref 10–15)
GFR SERPL CREATININE-BSD FRML MDRD: 84 ML/MIN/{1.73_M2}
GLUCOSE SERPL-MCNC: 87 MG/DL (ref 70–99)
HCT VFR BLD AUTO: 42 % (ref 35–47)
HDLC SERPL-MCNC: 104 MG/DL
HGB BLD-MCNC: 14.1 G/DL (ref 11.7–15.7)
IMM GRANULOCYTES # BLD: 0 10E9/L (ref 0–0.4)
IMM GRANULOCYTES NFR BLD: 0.2 %
LDLC SERPL CALC-MCNC: 49 MG/DL
LYMPHOCYTES # BLD AUTO: 1 10E9/L (ref 0.8–5.3)
LYMPHOCYTES NFR BLD AUTO: 21.6 %
MCH RBC QN AUTO: 31.9 PG (ref 26.5–33)
MCHC RBC AUTO-ENTMCNC: 33.6 G/DL (ref 31.5–36.5)
MCV RBC AUTO: 95 FL (ref 78–100)
MONOCYTES # BLD AUTO: 0.3 10E9/L (ref 0–1.3)
MONOCYTES NFR BLD AUTO: 6.1 %
NEUTROPHILS # BLD AUTO: 3.1 10E9/L (ref 1.6–8.3)
NEUTROPHILS NFR BLD AUTO: 70.9 %
NONHDLC SERPL-MCNC: 81 MG/DL
NRBC # BLD AUTO: 0 10*3/UL
NRBC BLD AUTO-RTO: 0 /100
PLATELET # BLD AUTO: 180 10E9/L (ref 150–450)
POTASSIUM SERPL-SCNC: 4 MMOL/L (ref 3.4–5.3)
PROT SERPL-MCNC: 6 G/DL (ref 6.8–8.8)
RBC # BLD AUTO: 4.42 10E12/L (ref 3.8–5.2)
SODIUM SERPL-SCNC: 140 MMOL/L (ref 133–144)
T4 FREE SERPL-MCNC: 0.76 NG/DL (ref 0.76–1.46)
TRIGL SERPL-MCNC: 163 MG/DL
TSH SERPL DL<=0.005 MIU/L-ACNC: 0.39 MU/L (ref 0.4–4)
WBC # BLD AUTO: 4.4 10E9/L (ref 4–11)

## 2021-04-13 PROCEDURE — 80375 DRUG/SUBSTANCE NOS 1-3: CPT | Performed by: INTERNAL MEDICINE

## 2021-04-13 PROCEDURE — 84443 ASSAY THYROID STIM HORMONE: CPT | Performed by: INTERNAL MEDICINE

## 2021-04-13 PROCEDURE — G0463 HOSPITAL OUTPT CLINIC VISIT: HCPCS

## 2021-04-13 PROCEDURE — 36415 COLL VENOUS BLD VENIPUNCTURE: CPT

## 2021-04-13 PROCEDURE — 80061 LIPID PANEL: CPT | Performed by: INTERNAL MEDICINE

## 2021-04-13 PROCEDURE — 80053 COMPREHEN METABOLIC PANEL: CPT | Performed by: INTERNAL MEDICINE

## 2021-04-13 PROCEDURE — 84439 ASSAY OF FREE THYROXINE: CPT | Performed by: INTERNAL MEDICINE

## 2021-04-13 PROCEDURE — 84999 UNLISTED CHEMISTRY PROCEDURE: CPT | Performed by: INTERNAL MEDICINE

## 2021-04-13 PROCEDURE — 99215 OFFICE O/P EST HI 40 MIN: CPT | Performed by: INTERNAL MEDICINE

## 2021-04-13 PROCEDURE — 82024 ASSAY OF ACTH: CPT | Performed by: INTERNAL MEDICINE

## 2021-04-13 PROCEDURE — 82627 DEHYDROEPIANDROSTERONE: CPT | Performed by: INTERNAL MEDICINE

## 2021-04-13 PROCEDURE — 85025 COMPLETE CBC W/AUTO DIFF WBC: CPT | Performed by: INTERNAL MEDICINE

## 2021-04-13 RX ORDER — TEMAZEPAM 15 MG/1
15 CAPSULE ORAL
Qty: 30 CAPSULE | Refills: 3 | Status: SHIPPED | OUTPATIENT
Start: 2021-04-13 | End: 2021-08-20

## 2021-04-13 RX ORDER — FLUDROCORTISONE ACETATE 0.1 MG/1
0.2 TABLET ORAL DAILY
Qty: 180 TABLET | Refills: 3 | Status: SHIPPED | OUTPATIENT
Start: 2021-04-13 | End: 2021-06-07

## 2021-04-13 ASSESSMENT — PAIN SCALES - GENERAL: PAINLEVEL: NO PAIN (0)

## 2021-04-13 NOTE — NURSING NOTE
Chief Complaint   Patient presents with     Blood Draw     Labs drawn via  by RN in lab. VS taken.      Labs collected from venipuncture by RN. Pt reported she held mitotane. 24 hour labeled urine jug and toilet hat provided for pt to collect at home. Pt knows to bring 24 hr urine to 1st floor lab when completed. Vitals taken. Pt tolerated well.      Faby Farrar RN

## 2021-04-13 NOTE — LETTER
"    4/13/2021         RE: Suzy Rodríguez  [address]  [facility] [address]        Dear Colleague,    Thank you for referring your patient, Suzy Rodríguez, to the [facility]. Please see a copy of my visit note below.    MEDICAL ONCOLOGY CLINIC NOTE    REFERRING PROVIDER: Warren Mansfield MD from [facility].      REASON FOR CURRENT VISIT: Follow-up for adrenocortical carcinoma, currently on adjuvant mitotane.    HISTORY OF PRESENT ILLNESS: Ms. 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     Suzy has been on mitotane [phone] since around Christmas 2020.     She was under care of my colleague - Dr. Cintron and is transferring care as Dr. Cintron is leaving. She is stable overall but continues to have a range of her symptoms from mitotane. Her weight is stable, blood pressures are normal. She continues to have brain fog, tremors, poor sleep at night. She has intermittent headaches when watching TV.     Continues hydrocortisone 30mg QAM, 20mg early PM; fluodrocortisone 0.1mg/day and levothyroxine to 125 mcg/day and then 2 tablets on Sunday. She also follows with Dr. Veena Jaquez in our endocrinology division.     ONCOLOGIC HISTORY:  1. 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   - CT abdomen and pelvis stone protocol without contrast 5 8018 showed \"9.2 x 8 cm heterogeneous right upper quadrant mass with small foci of calcification within it which is likely arising from the adrenal gland though inseparable from liver and upper pole of right kidney. It is incompletely evaluated on this noncontrast study. 3 x 2.6 cm mass right lobe of liver on image #85 " "also incompletely evaluated. 6 x 4 x 4 mm upper third left ureteral obstructing stone with mild to moderate secondary hydronephrosis. Collection of stones at lower pole left kidney extending over an area of approximately 6 x 7 x 4 mm. Additional nonobstructing 1 mm stone upper pole left kidney. 2 mm nonobstructing stone noted upper pole right kidney with no hydronephrosis on the right. Postop change consistent with gastric bypass. Noncontrast images of spleen, left adrenal gland, gallbladder, and pancreas unremarkable. No aneurysm. No adenopathy.\"  - Seen by Dr. Delvalle at [facility] clinic. Referred to Dr. Alvino Patel and then Dr. Warren Mansfield.  - Endocrinology team directed workup showed high DHEAS level of 740 pre-surgery.   - Right open adrenalectomy and hepatic mobilization performed by Dr. Warren Mansfield on 6/25/2018. Pathology showed adrenocortical carcinoma measuring 11.3 cm, weighing 413 g with extension into or through the adrenal capsule negative lymphovascular invasion, tumor necrosis present, margins involved by tumor, no regional lymph nodes identified, pathologic stage T2 NX.  pathology review reveals low grade ACC.  - DHEAS normalized postsurgery.   - Adjuvant Mitotane started on 7/24/18. Dose increased to 2000mg TID around 10/23/18 due to persistently low troughs. Held 1/16/19 for two weeks and resumed 1/30 at 1000 mg po BID due to very poor tolerance of higher doses. Highest mitotane level was 10.2 on 2/11/19. Mitotane troughs did not rise above 10 despite increase in dose to 1500 BID and then 1500+2000. Started 2000mg BID on 10/28/20. Increased to [phone] since around Christmas 2020.   - Saw radiation oncology at [facility], radiation oncology at [facility], as well as endocrine oncology (Dr. Huertas) at [facility].   - S/p adjuvant RT by Dr. Monsivais - 5040 cGy over 30 fr (8/24/18-10/6/18).  - 2/11/19: OSH CT C/A/P and MRI brain with " contrast - no evidence of disease recurrence.   - 20, 20, 20, 3/16/2021: CT C/A/P with contrast - AFUA.    REVIEW OF SYSTEMS: 14 point ROS negative other than the symptoms noted above in the HPI.    PAST MEDICAL HISTORY:  Past Medical History:   Diagnosis Date     Adrenal cortex cancer, right (H) 2018    Resected 18, Dr. Mansfield.     Adrenal mass (H)      Chocolate cyst of ovary      History of miscarriage      Malignant neoplasm of cortex of right adrenal gland (H) 2018    EOTD; 19.  Check in May. SCP started.  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) 2018   Cushing's syndrome, secondary to #1 (300 mcg/24 hr); Rx hydrocortisone 20 + 10 post op   Post operative defect right hemidiaphragm with ?worsening right effussion, not present preop   Currently on mitotane, 500 mg, BID x 1 week + hydrocortisone     MTHFR mutation (H)    MHTFR mutation with h/o mutiple miscarriages.    PAST SURGICAL HISTORY:   Past Surgical History:   Procedure Laterality Date     ADRENALECTOMY Right 2018    Procedure: ADRENALECTOMY;  Right Adrenalectomy,  Embolize Liver , Anesthesia Block;  Surgeon: Warren Mansfield MD;  Location: [facility] OR     ADRENALECTOMY        SECTION      twice      SECTION      2     EMBOLECTOMY ABDOMEN N/A 2018    Procedure: EMBOLECTOMY ABDOMEN;;  Surgeon: Ector Mcintyre MD;  Location: [facility] OR     EXTRACORPOREAL SHOCK WAVE LITHOTRIPSY (ESWL)       GASTRIC BYPASS        SOCIAL HISTORY:   Social History     Tobacco Use     Smoking status: Never Smoker     Smokeless tobacco: Never Used   Substance Use Topics     Alcohol use: Yes     Comment: occ.     Drug use: No     FAMILY HISTORY:   Family History   Problem Relation Age of Onset     Depression Paternal Grandmother      ALLERGIES:   Allergies   Allergen Reactions     Bee Venom Swelling     Ibuprofen Hives and Swelling     CURRENT  MEDICATIONS:   Current Outpatient Medications   Medication Sig     acetaminophen (TYLENOL) 325 MG tablet Take 2 tablets (650 mg) by mouth every 4 hours as needed for other (multimodal surgical pain management along with NSAIDS and opioid medication as indicated based on pain control and physical function.)     buPROPion (WELLBUTRIN) 100 MG tablet TAKE 1 TABLET (100 MG TOTAL) BY MOUTH 2 (TWO) TIMES A DAY.     calcium carbonate 500 mg, elemental, (OSCAL) 500 MG tablet Take 1 tablet (500 mg) by mouth 2 times daily     cholecalciferol (VITAMIN D-1000 MAX ST) 1000 units TABS Take 2,000 Units by mouth every morning      clonazePAM (KLONOPIN) 0.5 MG tablet Take 1 tablet (0.5 mg) by mouth 2 times daily as needed for anxiety     ferrous fumarate 65 mg, Eyak. FE,-Vitamin C 125 mg (VITRON-C)  MG TABS tablet Take 1 tablet by mouth 3 times daily     fludrocortisone (FLORINEF) 0.1 MG tablet Take 2 tablets (0.2 mg) by mouth daily     FLUoxetine (PROZAC) 40 MG capsule Take 60 mg by mouth daily      gabapentin (NEURONTIN) 300 MG capsule Take 3 capsules (900 mg) by mouth 3 times daily     hydrocortisone (CORTEF) 10 MG tablet Take 3 tablets (30 MG) every morning and take 2 tablets (20 MG) by mouth every day at 2 PM. Additional as directed.     levothyroxine (SYNTHROID/LEVOTHROID) 125 MCG tablet Take 1 tablet (125 mcg) by mouth daily (Patient taking differently: Take 125 mcg by mouth daily On Sunday's - 250 mg)     mitotane (LYSODREN) 500 MG tablet Take 4 tablets (2,000 mg) by mouth every morning AND 3 tablets (1,500 mg) daily (with lunch) AND 4 tablets (2,000 mg) every evening.     Multiple Vitamins-Calcium (ONE-A-DAY WOMENS PO) Take 2 tablets by mouth every morning      ondansetron (ZOFRAN) 8 MG tablet Take 1 tablet (8 mg) by mouth every 8 hours as needed for nausea     prochlorperazine (COMPAZINE) 5 MG tablet Take 1 tablet (5 mg) by mouth every 8 hours as needed for nausea or vomiting     temazepam (RESTORIL) 15 MG capsule  Take 1 capsule (15 mg) by mouth nightly as needed for sleep     traZODone (DESYREL) 50 MG tablet Take 1 tablet (50 mg) by mouth At Bedtime     blood glucose (NO BRAND SPECIFIED) test strip Use to test blood sugar as directed. (Patient not taking: Reported on 3/17/2021)     blood glucose monitoring (NO BRAND SPECIFIED) meter device kit Use to test blood sugar a the time you experience symptoms of a low BG. (Patient not taking: Reported on 3/17/2021)     diazepam (VALIUM) 5 MG tablet Take 1 tablet (5 mg) by mouth once as needed for anxiety (MRI claustrophobia management) (Patient not taking: Reported on 3/17/2021)     EPINEPHrine (EPIPEN/ADRENACLICK/OR ANY BX GENERIC EQUIV) 0.3 MG/0.3ML injection 2-pack As directed for bee stings     mitotane (LYSODREN) 500 MG tablet Take 4 tablets (2,000 mg) by mouth every morning AND 3 tablets (1,500 mg) daily (with lunch) AND 4 tablets (2,000 mg) every evening.     UNABLE TO FIND medical cannabis (Patient's own supply. Not a prescription)     No current facility-administered medications for this visit.         PHYSICAL EXAMINATION:  Vital signs: /73 (BP Location: Right arm, Patient Position: Sitting, Cuff Size: Adult Regular)   Pulse 73   Temp 98.1  F (36.7  C) (Oral)   Resp 16   Wt 68.5 kg (151 lb 1.6 oz)   SpO2 99%   BMI 25.94 kg/m     Detailed physical exam not done    LABORATORY DATA:   Recent Labs   Lab Test 04/13/21  1442 03/17/21  1135 01/20/21  1146 12/09/20  1530 10/28/20  1532    140 136 138 140   POTASSIUM 4.0 3.0* 3.7 4.1 3.6   CHLORIDE 109 108 104 106 110*   CO2 26 25 29 28 26   ANIONGAP 5 7 3 4 5   BUN 12 7 10 10 9   CR 0.88 0.67 0.68 0.72 0.67   GLC 87 116* 93 83 85   SHASHA 7.8* 8.0* 7.6* 8.0* 8.1*     No results for input(s): MAG, PHOS in the last 57246 hours.  Recent Labs   Lab Test 04/13/21  1442 03/17/21  1135 01/20/21  1146 12/09/20  1530 10/28/20  1532   WBC 4.4 4.7 6.1 7.5 10.1   HGB 14.1 14.7 14.8 14.0 13.3    186 177 229 145*   MCV 95  97 98 96 99   NEUTROPHIL 70.9 66.5 66.2 78.3 84.3     Recent Labs   Lab Test 04/13/21  1442 03/17/21  1135 01/20/21  1146   BILITOTAL 0.2 0.2 0.2   ALKPHOS 82 79 80   ALT 21 25 18   AST 15 19 18   ALBUMIN 2.9* 3.0* 3.0*     TSH   Date Value Ref Range Status   04/13/2021 0.39 (L) 0.40 - 4.00 mU/L Final   03/17/2021 0.18 (L) 0.40 - 4.00 mU/L Final   01/20/2021 0.32 (L) 0.40 - 4.00 mU/L Final     No results for input(s): CEA in the last 31530 hours.  Results for orders placed or performed in visit on 03/16/21   CT Chest/Abdomen/Pelvis w Contrast    Narrative    EXAMINATION: CT CHEST/ABDOMEN/PELVIS W CONTRAST, 3/16/2021 12:26 PM    TECHNIQUE: Helical CT images from the thoracic inlet through the  symphysis pubis were obtained with intravenous contrast. Contrast  dose: Isovue 370 93cc    COMPARISON: 12/7/2020 CT chest/abdomen/pelvis    HISTORY: h/o adrenocortical ca, s/p adrenalectomy and adjuvant RT in  2018, now s/p \R\3 years of adjuvant mitotane.; Malignant neoplasm  of cortex of right adrenal gland (H)    FINDINGS:    Chest: No pleural effusion or pneumothorax. No focal consolidation.  Linear fibroatelectasis in the right lower lobe with associated  subpleural reticular abnormality in the posterior right costophrenic  angle, unchanged since prior. No suspicious pulmonary nodule. The  central tracheobronchial tree is clear.    Normal heart size. No pericardial effusion. Normal caliber ascending  aorta and main pulmonary artery. No central pulmonary embolism. Trace  residual thymic tissue in the anterior mediastinum. No thoracic  lymphadenopathy.    Abdomen and pelvis: Unchanged hemangioma with peripheral nodular  enhancement at the junction of hepatic segments 5 and 6. No focal  suspicious hepatic lesion. The gallbladder, biliary tree, pancreas,  spleen, and left adrenal gland are normal. Surgical changes of right  adrenalectomy. Continued cortical volume loss and hypoenhancement in  the upper pole of the right  kidney. The kidneys are otherwise  unremarkable. No hydronephrosis, hydroureter, or urinary tract stone.  The bladder and uterus are normal. Well-positioned intrauterine  device. Bilateral ovarian cystic foci, measuring up to 2.0 cm on the  right and 2.4 cm on the left, compatible with follicles. Rim  enhancement of a smaller cystic structure arising from the left ovary,  likely a corpus luteum. No free fluid or free air. No bowel  obstruction or inflammation. Moderate colonic stool burden. Normal  appendix. Surgical changes of Tanika-en-Y gastric bypass. Unchanged  lobule of fat with irregular soft tissue in the pelvis adjacent to the  rectosigmoid colon. The major abdominal vasculature is patent. No  abdominal or pelvic lymphadenopathy.    Bones and soft tissues: Tiny fat-containing umbilical hernia.  Unchanged soft tissue nodule in the deep subcutaneous fat of the  anterior inferior abdominal wall superficial to the rectus abdominous  musculature, likely postsurgical. No acute or aggressive osseus  abnormality.      Impression    IMPRESSION:   1. Stable changes of right adrenalectomy and adjuvant radiation  therapy without evidence of residual/recurrent or metastatic disease  in the chest, abdomen, or pelvis.  2. Stable cortical volume loss and hypoenhancement in the upper pole  of the right kidney, likely sequela of prior radiation.    MARNIE LEBRON DO      Ref. Range 12/15/2020 11:45 1/28/2021 16:35 3/25/2021 14:51   Cortisol Free Duration Urine Latest Units: h 24 24 24   Cortisol Free Urine Latest Ref Range: <=45.0 ug/d 86.8 (H) 102.6 (H) 158.2 (H)   Cortisol Free Urine Random Latest Units: ug/L 48.20 45.10 90.40   Cortisol ug/g creatinine Latest Units: ug/g CRT 86.07 79.12 118.95   Creatinine Urine/Volume Latest Units: mg/dL 56 57 76   Creatinine Urine/24hr Latest Ref Range: 700 - 1,600 mg/d 1,008 1,297 1,330       DHEAS was 740 on 6/5/18, and has been <15 on 7/12/18, 8/20/18, 9/19/18, 10/23/18, 11/27/18,  1/9/19, 5/8/19, 6/18/19, 7/17/19, 11/15/19, 12/12/19, 1/22/20, 3/30/20 and 6/8/20, 7/21/20, 8/5/20, 1/20/21.  Mitotane level (target 14-20): 1.8ng/dl on 8/20/18, 3.5 on 10/25/18, 6.2 on 12/4/18, 8.1 on 1/9/19 and 10.2 on 2/11/19, 14.2 on 6/18/19. 10.8 on 12/12/19.  10 on 1/24/20. 8.9 on 5/14/20. 8.6 on 7/21/20, 8.3 on 8/5/20, 14.0 in 1/2021.  Labs from [facility] 9/24/19 - WBC 4200, ANC 2600, Hb 10.4, Platelets 218K, lytes WNL, creat 0.61, Calcium 8, albumin 3.5, LFTs normal, mitotane level 18.8, cortisol 25.8, ACTH<5, aldosterone 4.9, free T4 1.17, TSH 0.07, DHEAS <15, renin plasma 10.6.     IMAGING DATA:  As above.    ASSESSMENT AND PLAN: A 35-year-old lady with right-sided functioning high-risk adrenocortical carcinoma.     Adrenocortical carcinoma:  - Started on adjuvant mitotane in July 2018 based on her presentation and tumor characteristics including invasion of the adrenal capsule, high mitotic rate, possibly positive margins, and high Ki67, which could result in a rate of recurrence as high as 40%.      I had a lengthy discussion with Suzy who is accompanied by her . She started off with interviewing me on my experience with adrenal malignancies. I explained her that this is a relatively rare malignancies and I do get a couple of new patients a year. I have currently 2 patients with advanced disease on therapy but this is not typical. She wondered if I have known of Dr. Hilton from [place]. I admitted that I have not known him to be a  oncologist. I do know the  oncologist at [facility]. I expressed that I am willing to work with Dr. Hilton.     She is planning on a girls only trip to [place]. I encouraged her to take the trip. Our goal is not to limit activities. We would try as much as possible to not let the cancer define our life.     We again reviewed the duration of therapy which she has discussed with Dr. Cintron and Dr. Hilton. She is close to 3 yrs out from treatment completion.  We would not have clinical trials for the duration of therapy comparing the results between 3 years, 4 years and 5 years.  As long as the disease has not recurred this far, it is extremely unlikely that it should come back.  However if it would help her sleep at night knowing that she is on adjuvant therapy I would not fight continued therapy for little longer.    She inquired about other local and regional experts.  I reviewed the option of seeking another opinion at [facility].  Locally I would recommend Dr. Efe Benoit, one of my mentors who was previously at [facility] and now has moved to [facility].     She continues to have range of symptoms.  We tried to make sense of the symptoms to assess if they are related to the disease, previous therapy or ongoing mitotane.  I will try to continue to support her with all of her symptoms to the best we can.  She complained of insomnia difficulty sleeping.  I reviewed management of insomnia including sleep hygiene-no caffeine post afternoon, no television around bedtime, reading a book before bed and going to sleep when sleepy.  If these do not work then over-the-counter medications like diphenhydramine or melatonin can be used.  Beyond this we have multiple options including benzodiazepines like temazepam or other sedatives.  She expressed interest in temazepam.  She has already tried all other techniques and they do not seem to work for her.    We will continue to monitor every 6 weekly mitotane level, CBC, comprehensive panel, TSH, FT4, DHEAS, ACTH, periodic cholesterol panel, and 24-hour Urine Free Cortisol.     I will have her follow-up with one of my nurse practitioners in 6 weeks and I will follow her in 3 months.  She wondered if I could reach out to Dr. Hilton. I do not have any new questions for Dr. Hilton at [place] at this time but I would be happy to call him if there are any questions or once we are closer to deciding on holding  therapy.    I have encouraged her to follow with endocrinology here at [facility]. She had previous consults but is not actively following endocrinology and would like to keep it this way.      45 minutes spent on the date of the encounter doing chart review, history and exam, documentation and further activities as noted above       Again, thank you for allowing me to participate in the care of your patient.        Sincerely,        Tru Hernadez MD

## 2021-04-13 NOTE — NURSING NOTE
"Oncology Rooming Note    April 13, 2021 3:00 PM   Suzy Rodríguez is a 35 year old female who presents for:    Chief Complaint   Patient presents with     Blood Draw     Labs drawn via  by RN in lab. VS taken.      Oncology Clinic Visit     NEW; CARCINOMA      Initial Vitals: /73 (BP Location: Right arm, Patient Position: Sitting, Cuff Size: Adult Regular)   Pulse 73   Temp 98.1  F (36.7  C) (Oral)   Resp 16   Wt 68.5 kg (151 lb 1.6 oz)   SpO2 99%   BMI 25.94 kg/m   Estimated body mass index is 25.94 kg/m  as calculated from the following:    Height as of 2/24/21: 1.626 m (5' 4\").    Weight as of this encounter: 68.5 kg (151 lb 1.6 oz). Body surface area is 1.76 meters squared.  No Pain (0) Comment: Data Unavailable   No LMP recorded. (Menstrual status: IUD).  Allergies reviewed: Yes  Medications reviewed: Yes    Medications: Medication refills not needed today.  Pharmacy name entered into [facility]:    [facility] - [address]  [facility] - [place], [place] - [address]  [id number] - [place], [place] - [address] [place] [address] AT [facility] [address] [address]    Clinical concerns: New patient.       Genny Bishop MA            "

## 2021-04-14 LAB
ACTH PLAS-MCNC: <10 PG/ML
DHEA-S SERPL-MCNC: <15 UG/DL (ref 35–430)
MISCELLANEOUS TEST: NORMAL

## 2021-04-14 NOTE — PROGRESS NOTES
"MEDICAL ONCOLOGY CLINIC NOTE    REFERRING PROVIDER: Warren Mansfield MD from [facility]rolog[facility] Oncology clinic.      REASON FOR CURRENT VISIT: Follow-up for adrenocortical carcinoma, currently on adjuvant mitotane.    HISTORY OF PRESENT ILLNESS: Ms. 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     Suzy has been on mitotane [phone] since around Christmas 2020.     She was under care of my colleague - Dr. Cintron and is transferring care as Dr. Cintron is leaving. She is stable overall but continues to have a range of her symptoms from mitotane. Her weight is stable, blood pressures are normal. She continues to have brain fog, tremors, poor sleep at night. She has intermittent headaches when watching TV.     Continues hydrocortisone 30mg QAM, 20mg early PM; fluodrocortisone 0.1mg/day and levothyroxine to 125 mcg/day and then 2 tablets on Sunday. She also follows with Dr. Veena Jaquez in our endocrinology division.     ONCOLOGIC HISTORY:  1. 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   - CT abdomen and pelvis stone protocol without contrast [id number] showed \"9.2 x 8 cm heterogeneous right upper quadrant mass with small foci of calcification within it which is likely arising from the adrenal gland though inseparable from liver and upper pole of right kidney. It is incompletely evaluated on this noncontrast study. 3 x 2.6 cm mass right lobe of liver on image #85 also incompletely evaluated. 6 x 4 x 4 mm upper third left ureteral obstructing stone with mild to moderate secondary hydronephrosis. Collection of stones at lower pole left kidney extending over an area of approximately 6 x 7 x 4 mm. Additional nonobstructing " "1 mm stone upper pole left kidney. 2 mm nonobstructing stone noted upper pole right kidney with no hydronephrosis on the right. Postop change consistent with gastric bypass. Noncontrast images of spleen, left adrenal gland, gallbladder, and pancreas unremarkable. No aneurysm. No adenopathy.\"  - Seen by Dr. Delvalle at [facility] clinic. Referred to Dr. Alvino Patel and then Dr. Warren Mansfield.  - Endocrinology team directed workup showed high DHEAS level of 740 pre-surgery.   - Right open adrenalectomy and hepatic mobilization performed by Dr. Warren Mansfield on 6/25/2018. Pathology showed adrenocortical carcinoma measuring 11.3 cm, weighing 413 g with extension into or through the adrenal capsule negative lymphovascular invasion, tumor necrosis present, margins involved by tumor, no regional lymph nodes identified, pathologic stage T2 NX.  pathology review reveals low grade ACC.  - DHEAS normalized postsurgery.   - Adjuvant Mitotane started on 7/24/18. Dose increased to 2000mg TID around 10/23/18 due to persistently low troughs. Held 1/16/19 for two weeks and resumed 1/30 at 1000 mg po BID due to very poor tolerance of higher doses. Highest mitotane level was 10.2 on 2/11/19. Mitotane troughs did not rise above 10 despite increase in dose to 1500 BID and then 1500+2000. Started 2000mg BID on 10/28/20. Increased to [phone] since around Christmas 2020.   - Saw radiation oncology at [facility], radiation oncology at [facility], as well as endocrine oncology (Dr. Huertas) at [facility].   - S/p adjuvant RT by Dr. Monsivais - 5040 cGy over 30 fr (8/24/18-10/6/18).  - 2/11/19: OSH CT C/A/P and MRI brain with contrast - no evidence of disease recurrence.   - 06/08/20, 09/14/20, 12/7/20, 3/16/2021: CT C/A/P with contrast - AFUA.    REVIEW OF SYSTEMS: 14 point ROS negative other than the symptoms noted above in the HPI.    PAST MEDICAL HISTORY:  Past Medical History: "   Diagnosis Date     Adrenal cortex cancer, right (H) 2018    Resected 18, Dr. Mansfield.     Adrenal mass (H)      Chocolate cyst of ovary      History of miscarriage      Malignant neoplasm of cortex of right adrenal gland (H) 2018    EOTD; 19.  Check in May. SCP started.  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) 2018   Cushing's syndrome, secondary to #1 (300 mcg/24 hr); Rx hydrocortisone 20 + 10 post op   Post operative defect right hemidiaphragm with ?worsening right effussion, not present preop   Currently on mitotane, 500 mg, BID x 1 week + hydrocortisone     MTHFR mutation (H)    MHTFR mutation with h/o mutiple miscarriages.    PAST SURGICAL HISTORY:   Past Surgical History:   Procedure Laterality Date     ADRENALECTOMY Right 2018    Procedure: ADRENALECTOMY;  Right Adrenalectomy,  Embolize Liver , Anesthesia Block;  Surgeon: Warren Mansfield MD;  Location: [facility] OR     ADRENALECTOMY        SECTION      twice      SECTION      2     EMBOLECTOMY ABDOMEN N/A 2018    Procedure: EMBOLECTOMY ABDOMEN;;  Surgeon: Ector Mcintyre MD;  Location: [facility] OR     EXTRACORPOREAL SHOCK WAVE LITHOTRIPSY (ESWL)       GASTRIC BYPASS        SOCIAL HISTORY:   Social History     Tobacco Use     Smoking status: Never Smoker     Smokeless tobacco: Never Used   Substance Use Topics     Alcohol use: Yes     Comment: occ.     Drug use: No     FAMILY HISTORY:   Family History   Problem Relation Age of Onset     Depression Paternal Grandmother      ALLERGIES:   Allergies   Allergen Reactions     Bee Venom Swelling     Ibuprofen Hives and Swelling     CURRENT MEDICATIONS:   Current Outpatient Medications   Medication Sig     acetaminophen (TYLENOL) 325 MG tablet Take 2 tablets (650 mg) by mouth every 4 hours as needed for other (multimodal surgical pain management along with NSAIDS and opioid medication as indicated  based on pain control and physical function.)     buPROPion (WELLBUTRIN) 100 MG tablet TAKE 1 TABLET (100 MG TOTAL) BY MOUTH 2 (TWO) TIMES A DAY.     calcium carbonate 500 mg, elemental, (OSCAL) 500 MG tablet Take 1 tablet (500 mg) by mouth 2 times daily     cholecalciferol (VITAMIN D-1000 MAX ST) 1000 units TABS Take 2,000 Units by mouth every morning      clonazePAM (KLONOPIN) 0.5 MG tablet Take 1 tablet (0.5 mg) by mouth 2 times daily as needed for anxiety     ferrous fumarate 65 mg, Yuhaaviatam. FE,-Vitamin C 125 mg (VITRON-C)  MG TABS tablet Take 1 tablet by mouth 3 times daily     fludrocortisone (FLORINEF) 0.1 MG tablet Take 2 tablets (0.2 mg) by mouth daily     FLUoxetine (PROZAC) 40 MG capsule Take 60 mg by mouth daily      gabapentin (NEURONTIN) 300 MG capsule Take 3 capsules (900 mg) by mouth 3 times daily     hydrocortisone (CORTEF) 10 MG tablet Take 3 tablets (30 MG) every morning and take 2 tablets (20 MG) by mouth every day at 2 PM. Additional as directed.     levothyroxine (SYNTHROID/LEVOTHROID) 125 MCG tablet Take 1 tablet (125 mcg) by mouth daily (Patient taking differently: Take 125 mcg by mouth daily On Sunday's - 250 mg)     mitotane (LYSODREN) 500 MG tablet Take 4 tablets (2,000 mg) by mouth every morning AND 3 tablets (1,500 mg) daily (with lunch) AND 4 tablets (2,000 mg) every evening.     Multiple Vitamins-Calcium (ONE-A-DAY WOMENS PO) Take 2 tablets by mouth every morning      ondansetron (ZOFRAN) 8 MG tablet Take 1 tablet (8 mg) by mouth every 8 hours as needed for nausea     prochlorperazine (COMPAZINE) 5 MG tablet Take 1 tablet (5 mg) by mouth every 8 hours as needed for nausea or vomiting     temazepam (RESTORIL) 15 MG capsule Take 1 capsule (15 mg) by mouth nightly as needed for sleep     traZODone (DESYREL) 50 MG tablet Take 1 tablet (50 mg) by mouth At Bedtime     blood glucose (NO BRAND SPECIFIED) test strip Use to test blood sugar as directed. (Patient not taking: Reported on  3/17/2021)     blood glucose monitoring (NO BRAND SPECIFIED) meter device kit Use to test blood sugar a the time you experience symptoms of a low BG. (Patient not taking: Reported on 3/17/2021)     diazepam (VALIUM) 5 MG tablet Take 1 tablet (5 mg) by mouth once as needed for anxiety (MRI claustrophobia management) (Patient not taking: Reported on 3/17/2021)     EPINEPHrine (EPIPEN/ADRENACLICK/OR ANY BX GENERIC EQUIV) 0.3 MG/0.3ML injection 2-pack As directed for bee stings     mitotane (LYSODREN) 500 MG tablet Take 4 tablets (2,000 mg) by mouth every morning AND 3 tablets (1,500 mg) daily (with lunch) AND 4 tablets (2,000 mg) every evening.     UNABLE TO FIND medical cannabis (Patient's own supply. Not a prescription)     No current facility-administered medications for this visit.         PHYSICAL EXAMINATION:  Vital signs: /73 (BP Location: Right arm, Patient Position: Sitting, Cuff Size: Adult Regular)   Pulse 73   Temp 98.1  F (36.7  C) (Oral)   Resp 16   Wt 68.5 kg (151 lb 1.6 oz)   SpO2 99%   BMI 25.94 kg/m     Detailed physical exam not done    LABORATORY DATA:   Recent Labs   Lab Test 04/13/21  1442 03/17/21  1135 01/20/21  1146 12/09/20  1530 10/28/20  1532    140 136 138 140   POTASSIUM 4.0 3.0* 3.7 4.1 3.6   CHLORIDE 109 108 104 106 110*   CO2 26 25 29 28 26   ANIONGAP 5 7 3 4 5   BUN 12 7 10 10 9   CR 0.88 0.67 0.68 0.72 0.67   GLC 87 116* 93 83 85   SHASHA 7.8* 8.0* 7.6* 8.0* 8.1*     No results for input(s): MAG, PHOS in the last 80747 hours.  Recent Labs   Lab Test 04/13/21  1442 03/17/21  1135 01/20/21  1146 12/09/20  1530 10/28/20  1532   WBC 4.4 4.7 6.1 7.5 10.1   HGB 14.1 14.7 14.8 14.0 13.3    186 177 229 145*   MCV 95 97 98 96 99   NEUTROPHIL 70.9 66.5 66.2 78.3 84.3     Recent Labs   Lab Test 04/13/21  1442 03/17/21  1135 01/20/21  1146   BILITOTAL 0.2 0.2 0.2   ALKPHOS 82 79 80   ALT 21 25 18   AST 15 19 18   ALBUMIN 2.9* 3.0* 3.0*     TSH   Date Value Ref Range Status    04/13/2021 0.39 (L) 0.40 - 4.00 mU/L Final   03/17/2021 0.18 (L) 0.40 - 4.00 mU/L Final   01/20/2021 0.32 (L) 0.40 - 4.00 mU/L Final     No results for input(s): CEA in the last 11796 hours.  Results for orders placed or performed in visit on 03/16/21   CT Chest/Abdomen/Pelvis w Contrast    Narrative    EXAMINATION: CT CHEST/ABDOMEN/PELVIS W CONTRAST, 3/16/2021 12:26 PM    TECHNIQUE: Helical CT images from the thoracic inlet through the  symphysis pubis were obtained with intravenous contrast. Contrast  dose: Isovue 370 93cc    COMPARISON: 12/7/2020 CT chest/abdomen/pelvis    HISTORY: h/o adrenocortical ca, s/p adrenalectomy and adjuvant RT in  2018, now s/p \R\3 years of adjuvant mitotane.; Malignant neoplasm  of cortex of right adrenal gland (H)    FINDINGS:    Chest: No pleural effusion or pneumothorax. No focal consolidation.  Linear fibroatelectasis in the right lower lobe with associated  subpleural reticular abnormality in the posterior right costophrenic  angle, unchanged since prior. No suspicious pulmonary nodule. The  central tracheobronchial tree is clear.    Normal heart size. No pericardial effusion. Normal caliber ascending  aorta and main pulmonary artery. No central pulmonary embolism. Trace  residual thymic tissue in the anterior mediastinum. No thoracic  lymphadenopathy.    Abdomen and pelvis: Unchanged hemangioma with peripheral nodular  enhancement at the junction of hepatic segments 5 and 6. No focal  suspicious hepatic lesion. The gallbladder, biliary tree, pancreas,  spleen, and left adrenal gland are normal. Surgical changes of right  adrenalectomy. Continued cortical volume loss and hypoenhancement in  the upper pole of the right kidney. The kidneys are otherwise  unremarkable. No hydronephrosis, hydroureter, or urinary tract stone.  The bladder and uterus are normal. Well-positioned intrauterine  device. Bilateral ovarian cystic foci, measuring up to 2.0 cm on the  right and 2.4 cm on  the left, compatible with follicles. Rim  enhancement of a smaller cystic structure arising from the left ovary,  likely a corpus luteum. No free fluid or free air. No bowel  obstruction or inflammation. Moderate colonic stool burden. Normal  appendix. Surgical changes of Tanika-en-Y gastric bypass. Unchanged  lobule of fat with irregular soft tissue in the pelvis adjacent to the  rectosigmoid colon. The major abdominal vasculature is patent. No  abdominal or pelvic lymphadenopathy.    Bones and soft tissues: Tiny fat-containing umbilical hernia.  Unchanged soft tissue nodule in the deep subcutaneous fat of the  anterior inferior abdominal wall superficial to the rectus abdominous  musculature, likely postsurgical. No acute or aggressive osseus  abnormality.      Impression    IMPRESSION:   1. Stable changes of right adrenalectomy and adjuvant radiation  therapy without evidence of residual/recurrent or metastatic disease  in the chest, abdomen, or pelvis.  2. Stable cortical volume loss and hypoenhancement in the upper pole  of the right kidney, likely sequela of prior radiation.    MARNIE LEBRON DO      Ref. Range 12/15/2020 11:45 1/28/2021 16:35 3/25/2021 14:51   Cortisol Free Duration Urine Latest Units: h 24 24 24   Cortisol Free Urine Latest Ref Range: <=45.0 ug/d 86.8 (H) 102.6 (H) 158.2 (H)   Cortisol Free Urine Random Latest Units: ug/L 48.20 45.10 90.40   Cortisol ug/g creatinine Latest Units: ug/g CRT 86.07 79.12 118.95   Creatinine Urine/Volume Latest Units: mg/dL 56 57 76   Creatinine Urine/24hr Latest Ref Range: 700 - 1,600 mg/d 1,008 1,297 1,330       DHEAS was 740 on 6/5/18, and has been <15 on 7/12/18, 8/20/18, 9/19/18, 10/23/18, 11/27/18, 1/9/19, 5/8/19, 6/18/19, 7/17/19, 11/15/19, 12/12/19, 1/22/20, 3/30/20 and 6/8/20, 7/21/20, 8/5/20, 1/20/21.  Mitotane level (target 14-20): 1.8ng/dl on 8/20/18, 3.5 on 10/25/18, 6.2 on 12/4/18, 8.1 on 1/9/19 and 10.2 on 2/11/19, 14.2 on 6/18/19. 10.8 on 12/12/19.   10 on 1/24/20. 8.9 on 5/14/20. 8.6 on 7/21/20, 8.3 on 8/5/20, 14.0 in 1/2021.  Labs from [facility] 9/24/19 - WBC 4200, ANC 2600, Hb 10.4, Platelets 218K, lytes WNL, creat 0.61, Calcium 8, albumin 3.5, LFTs normal, mitotane level 18.8, cortisol 25.8, ACTH<5, aldosterone 4.9, free T4 1.17, TSH 0.07, DHEAS <15, renin plasma 10.6.     IMAGING DATA:  As above.    ASSESSMENT AND PLAN: A 35-year-old lady with right-sided functioning high-risk adrenocortical carcinoma.     Adrenocortical carcinoma:  - Started on adjuvant mitotane in July 2018 based on her presentation and tumor characteristics including invasion of the adrenal capsule, high mitotic rate, possibly positive margins, and high Ki67, which could result in a rate of recurrence as high as 40%.      I had a lengthy discussion with Suzy who is accompanied by her . She started off with interviewing me on my experience with adrenal malignancies. I explained her that this is a relatively rare malignancies and I do get a couple of new patients a year. I have currently 2 patients with advanced disease on therapy but this is not typical. She wondered if I have known of Dr. Hilton from [place]. I admitted that I have not known him to be a  oncologist. I do know the  oncologist at [facility]. I expressed that I am willing to work with Dr. Hilton.     She is planning on a girls only trip to [place]. I encouraged her to take the trip. Our goal is not to limit activities. We would try as much as possible to not let the cancer define our life.     We again reviewed the duration of therapy which she has discussed with Dr. Cintron and Dr. Hilton. She is close to 3 yrs out from treatment completion. We would not have clinical trials for the duration of therapy comparing the results between 3 years, 4 years and 5 years.  As long as the disease has not recurred this far, it is extremely unlikely that it should come back.  However if it would help her sleep at  night knowing that she is on adjuvant therapy I would not fight continued therapy for little longer.    She inquired about other local and regional experts.  I reviewed the option of seeking another opinion at [facility].  Locally I would recommend Dr. Efe Benoit, one of my mentors who was previously at [facility] and now has moved to [facility].     She continues to have range of symptoms.  We tried to make sense of the symptoms to assess if they are related to the disease, previous therapy or ongoing mitotane.  I will try to continue to support her with all of her symptoms to the best we can.  She complained of insomnia difficulty sleeping.  I reviewed management of insomnia including sleep hygiene-no caffeine post afternoon, no television around bedtime, reading a book before bed and going to sleep when sleepy.  If these do not work then over-the-counter medications like diphenhydramine or melatonin can be used.  Beyond this we have multiple options including benzodiazepines like temazepam or other sedatives.  She expressed interest in temazepam.  She has already tried all other techniques and they do not seem to work for her.    We will continue to monitor every 6 weekly mitotane level, CBC, comprehensive panel, TSH, FT4, DHEAS, ACTH, periodic cholesterol panel, and 24-hour Urine Free Cortisol.     I will have her follow-up with one of my nurse practitioners in 6 weeks and I will follow her in 3 months.  She wondered if I could reach out to Dr. Hilton. I do not have any new questions for Dr. Hilton at [place] at this time but I would be happy to call him if there are any questions or once we are closer to deciding on holding therapy.    I have encouraged her to follow with endocrinology here at [facility]. She had previous consults but is not actively following endocrinology and would like to keep it this way.      45 minutes spent on the date of the encounter doing chart  review, history and exam, documentation and further activities as noted above

## 2021-04-15 ENCOUNTER — MYC MEDICAL ADVICE (OUTPATIENT)
Dept: ONCOLOGY | Facility: CLINIC | Age: 36
End: 2021-04-15

## 2021-04-19 ENCOUNTER — RECORDS - HEALTHEAST (OUTPATIENT)
Dept: ADMINISTRATIVE | Facility: OTHER | Age: 36
End: 2021-04-19

## 2021-04-20 ENCOUNTER — TELEPHONE (OUTPATIENT)
Dept: ONCOLOGY | Facility: CLINIC | Age: 36
End: 2021-04-20

## 2021-04-22 ENCOUNTER — PATIENT OUTREACH (OUTPATIENT)
Dept: ONCOLOGY | Facility: CLINIC | Age: 36
End: 2021-04-22

## 2021-04-22 NOTE — PROGRESS NOTES
TC to pt returning her call regarding questions about her mitotane levels, continuing therapy, and where she's planning on continuing care now that Dr. Cintron is leaving. Told pt writer will ask pharmacy and her care team about how long she needs to have a therapeutic mitotane level in order to consider discontinuing but that she should also check with Dr. Huertas in [place] to see if he has this on file. [employer] sent to Dr. Cintron asking for his input. Pt also stated she's going to have a [place] consult and is also considering making an appt with Dr. Shaw to ensure she finds the best fit with a new provider. Pt stated understanding of all and agreed to call clinic with any questions or concerns.

## 2021-04-26 LAB — LAB SCANNED RESULT: NORMAL

## 2021-04-29 DIAGNOSIS — C74.01 MALIGNANT NEOPLASM OF CORTEX OF RIGHT ADRENAL GLAND (H): ICD-10-CM

## 2021-04-29 PROCEDURE — 82542 COL CHROMOTOGRAPHY QUAL/QUAN: CPT | Mod: 90 | Performed by: INTERNAL MEDICINE

## 2021-04-29 PROCEDURE — 99000 SPECIMEN HANDLING OFFICE-LAB: CPT | Performed by: INTERNAL MEDICINE

## 2021-04-29 PROCEDURE — 82530 CORTISOL FREE: CPT | Mod: 90 | Performed by: INTERNAL MEDICINE

## 2021-05-02 LAB
COLLECT DURATION TIME SPEC: 24 H
CORTIS F 24H UR HPLC-MCNC: 67.4 UG/L
CORTIS F 24H UR-MRATE: 111.2 UG/D
CORTIS F/CREAT 24H UR: 91.08 UG/G CRT
CREAT 24H UR-MRATE: 1221 MG/D (ref 700–1600)
CREAT UR-MCNC: 74 MG/DL
IMP & REVIEW OF LAB RESULTS: ABNORMAL
SPECIMEN VOL ?TM UR: 1650 ML

## 2021-05-04 ENCOUNTER — COMMUNICATION - HEALTHEAST (OUTPATIENT)
Dept: FAMILY MEDICINE | Facility: CLINIC | Age: 36
End: 2021-05-04

## 2021-05-04 DIAGNOSIS — F41.1 GENERALIZED ANXIETY DISORDER: ICD-10-CM

## 2021-05-04 DIAGNOSIS — F33.9 MAJOR DEPRESSIVE DISORDER, RECURRENT EPISODE (H): ICD-10-CM

## 2021-05-04 RX ORDER — SODIUM CHLORIDE 9 MG/ML
1000 INJECTION, SOLUTION INTRAVENOUS CONTINUOUS PRN
Status: CANCELLED
Start: 2021-05-05

## 2021-05-04 RX ORDER — EPINEPHRINE 0.3 MG/.3ML
0.3 INJECTION SUBCUTANEOUS EVERY 5 MIN PRN
Status: CANCELLED | OUTPATIENT
Start: 2021-05-05

## 2021-05-04 RX ORDER — EPINEPHRINE 1 MG/ML
0.3 INJECTION, SOLUTION, CONCENTRATE INTRAVENOUS EVERY 5 MIN PRN
Status: CANCELLED | OUTPATIENT
Start: 2021-05-05

## 2021-05-04 RX ORDER — ALBUTEROL SULFATE 0.83 MG/ML
2.5 SOLUTION RESPIRATORY (INHALATION)
Status: CANCELLED | OUTPATIENT
Start: 2021-05-05

## 2021-05-04 RX ORDER — MEPERIDINE HYDROCHLORIDE 25 MG/ML
25 INJECTION INTRAMUSCULAR; INTRAVENOUS; SUBCUTANEOUS EVERY 30 MIN PRN
Status: CANCELLED | OUTPATIENT
Start: 2021-05-05

## 2021-05-04 RX ORDER — ALBUTEROL SULFATE 90 UG/1
1-2 AEROSOL, METERED RESPIRATORY (INHALATION)
Status: CANCELLED
Start: 2021-05-05

## 2021-05-04 RX ORDER — DIPHENHYDRAMINE HYDROCHLORIDE 50 MG/ML
50 INJECTION INTRAMUSCULAR; INTRAVENOUS
Status: CANCELLED
Start: 2021-05-05

## 2021-05-05 LAB
COLLECT DURATION TIME UR: 24 H
CORTIS 24H UR-MRATE: 107 MCG/24 H (ref 3.5–45)
CORTISONE 24H UR-MRATE: 163 MCG/24 H (ref 17–129)
SPECIMEN VOL 24H UR: 1650 ML

## 2021-05-09 DIAGNOSIS — C74.01 MALIGNANT NEOPLASM OF CORTEX OF RIGHT ADRENAL GLAND (H): Primary | ICD-10-CM

## 2021-05-09 RX ORDER — SODIUM CHLORIDE 9 MG/ML
1000 INJECTION, SOLUTION INTRAVENOUS CONTINUOUS PRN
Status: DISCONTINUED | OUTPATIENT
Start: 2021-05-09 | End: 2021-05-09 | Stop reason: HOSPADM

## 2021-05-09 RX ORDER — ALBUTEROL SULFATE 0.83 MG/ML
2.5 SOLUTION RESPIRATORY (INHALATION)
Status: DISCONTINUED | OUTPATIENT
Start: 2021-05-09 | End: 2021-05-09 | Stop reason: HOSPADM

## 2021-05-09 RX ORDER — ALBUTEROL SULFATE 90 UG/1
1-2 AEROSOL, METERED RESPIRATORY (INHALATION)
Status: DISCONTINUED | OUTPATIENT
Start: 2021-05-09 | End: 2021-05-09 | Stop reason: HOSPADM

## 2021-05-09 RX ORDER — MEPERIDINE HYDROCHLORIDE 25 MG/ML
25 INJECTION INTRAMUSCULAR; INTRAVENOUS; SUBCUTANEOUS EVERY 30 MIN PRN
Status: DISCONTINUED | OUTPATIENT
Start: 2021-05-09 | End: 2021-05-09 | Stop reason: HOSPADM

## 2021-05-09 RX ORDER — EPINEPHRINE 0.3 MG/.3ML
0.3 INJECTION SUBCUTANEOUS EVERY 5 MIN PRN
Status: DISCONTINUED | OUTPATIENT
Start: 2021-05-09 | End: 2021-05-09 | Stop reason: HOSPADM

## 2021-05-09 RX ORDER — ONDANSETRON 8 MG/1
8 TABLET, FILM COATED ORAL EVERY 8 HOURS PRN
Qty: 30 TABLET | Refills: 0 | Status: SHIPPED | OUTPATIENT
Start: 2021-05-09 | End: 2022-11-21

## 2021-05-09 RX ORDER — EPINEPHRINE 1 MG/ML
0.3 INJECTION, SOLUTION, CONCENTRATE INTRAVENOUS EVERY 5 MIN PRN
Status: DISCONTINUED | OUTPATIENT
Start: 2021-05-09 | End: 2021-05-09 | Stop reason: HOSPADM

## 2021-05-09 RX ORDER — DIPHENHYDRAMINE HYDROCHLORIDE 50 MG/ML
50 INJECTION INTRAMUSCULAR; INTRAVENOUS
Status: DISCONTINUED | OUTPATIENT
Start: 2021-05-09 | End: 2021-05-09 | Stop reason: HOSPADM

## 2021-05-10 DIAGNOSIS — C74.01 MALIGNANT NEOPLASM OF CORTEX OF RIGHT ADRENAL GLAND (H): ICD-10-CM

## 2021-05-10 LAB
ALBUMIN SERPL-MCNC: 3 G/DL (ref 3.4–5)
ALP SERPL-CCNC: 82 U/L (ref 40–150)
ALT SERPL W P-5'-P-CCNC: 20 U/L (ref 0–50)
ANION GAP SERPL CALCULATED.3IONS-SCNC: 8 MMOL/L (ref 3–14)
AST SERPL W P-5'-P-CCNC: 18 U/L (ref 0–45)
BASOPHILS # BLD AUTO: 0 10E9/L (ref 0–0.2)
BASOPHILS NFR BLD AUTO: 0.8 %
BILIRUB SERPL-MCNC: 0.3 MG/DL (ref 0.2–1.3)
BUN SERPL-MCNC: 9 MG/DL (ref 7–30)
CALCIUM SERPL-MCNC: 7.5 MG/DL (ref 8.5–10.1)
CHLORIDE SERPL-SCNC: 107 MMOL/L (ref 94–109)
CHOLEST SERPL-MCNC: 152 MG/DL
CO2 SERPL-SCNC: 26 MMOL/L (ref 20–32)
CREAT SERPL-MCNC: 0.67 MG/DL (ref 0.52–1.04)
DIFFERENTIAL METHOD BLD: ABNORMAL
EOSINOPHIL # BLD AUTO: 0 10E9/L (ref 0–0.7)
EOSINOPHIL NFR BLD AUTO: 0.8 %
ERYTHROCYTE [DISTWIDTH] IN BLOOD BY AUTOMATED COUNT: 13.7 % (ref 10–15)
GFR SERPL CREATININE-BSD FRML MDRD: >90 ML/MIN/{1.73_M2}
GLUCOSE SERPL-MCNC: 138 MG/DL (ref 70–99)
HCT VFR BLD AUTO: 42.1 % (ref 35–47)
HDLC SERPL-MCNC: 98 MG/DL
HGB BLD-MCNC: 14.3 G/DL (ref 11.7–15.7)
IMM GRANULOCYTES # BLD: 0 10E9/L (ref 0–0.4)
IMM GRANULOCYTES NFR BLD: 0.3 %
LDLC SERPL CALC-MCNC: 34 MG/DL
LYMPHOCYTES # BLD AUTO: 1.2 10E9/L (ref 0.8–5.3)
LYMPHOCYTES NFR BLD AUTO: 30.5 %
MCH RBC QN AUTO: 32.3 PG (ref 26.5–33)
MCHC RBC AUTO-ENTMCNC: 34 G/DL (ref 31.5–36.5)
MCV RBC AUTO: 95 FL (ref 78–100)
MONOCYTES # BLD AUTO: 0.3 10E9/L (ref 0–1.3)
MONOCYTES NFR BLD AUTO: 7.4 %
NEUTROPHILS # BLD AUTO: 2.3 10E9/L (ref 1.6–8.3)
NEUTROPHILS NFR BLD AUTO: 60.2 %
NONHDLC SERPL-MCNC: 54 MG/DL
NRBC # BLD AUTO: 0 10*3/UL
NRBC BLD AUTO-RTO: 0 /100
PLATELET # BLD AUTO: 200 10E9/L (ref 150–450)
POTASSIUM SERPL-SCNC: 3.7 MMOL/L (ref 3.4–5.3)
PROT SERPL-MCNC: 5.8 G/DL (ref 6.8–8.8)
RBC # BLD AUTO: 4.43 10E12/L (ref 3.8–5.2)
SODIUM SERPL-SCNC: 140 MMOL/L (ref 133–144)
T4 FREE SERPL-MCNC: 0.84 NG/DL (ref 0.76–1.46)
TRIGL SERPL-MCNC: 98 MG/DL
TSH SERPL DL<=0.005 MIU/L-ACNC: 0.18 MU/L (ref 0.4–4)
WBC # BLD AUTO: 3.8 10E9/L (ref 4–11)

## 2021-05-10 PROCEDURE — 80053 COMPREHEN METABOLIC PANEL: CPT | Performed by: INTERNAL MEDICINE

## 2021-05-10 PROCEDURE — 80375 DRUG/SUBSTANCE NOS 1-3: CPT | Performed by: INTERNAL MEDICINE

## 2021-05-10 PROCEDURE — 80061 LIPID PANEL: CPT | Performed by: INTERNAL MEDICINE

## 2021-05-10 PROCEDURE — 84439 ASSAY OF FREE THYROXINE: CPT | Performed by: INTERNAL MEDICINE

## 2021-05-10 PROCEDURE — 82024 ASSAY OF ACTH: CPT | Performed by: INTERNAL MEDICINE

## 2021-05-10 PROCEDURE — 84999 UNLISTED CHEMISTRY PROCEDURE: CPT | Performed by: INTERNAL MEDICINE

## 2021-05-10 PROCEDURE — 82627 DEHYDROEPIANDROSTERONE: CPT | Performed by: INTERNAL MEDICINE

## 2021-05-10 PROCEDURE — 84443 ASSAY THYROID STIM HORMONE: CPT | Performed by: INTERNAL MEDICINE

## 2021-05-10 PROCEDURE — 85025 COMPLETE CBC W/AUTO DIFF WBC: CPT | Performed by: INTERNAL MEDICINE

## 2021-05-10 NOTE — NURSING NOTE
Chief Complaint   Patient presents with     Blood Draw     Labs drawn via  by RN.     Labs drawn with vpt by rn.  Pt tolerated well.    Brent Pena RN

## 2021-05-11 LAB
ACTH PLAS-MCNC: <10 PG/ML
DHEA-S SERPL-MCNC: <15 UG/DL (ref 35–430)
MISCELLANEOUS TEST: NORMAL

## 2021-05-13 DIAGNOSIS — C74.01 MALIGNANT NEOPLASM OF CORTEX OF RIGHT ADRENAL GLAND (H): Primary | ICD-10-CM

## 2021-05-13 LAB — LAB SCANNED RESULT: NORMAL

## 2021-05-17 ENCOUNTER — ONCOLOGY VISIT (OUTPATIENT)
Dept: ONCOLOGY | Facility: CLINIC | Age: 36
End: 2021-05-17
Attending: INTERNAL MEDICINE
Payer: COMMERCIAL

## 2021-05-17 VITALS
TEMPERATURE: 98.2 F | WEIGHT: 150 LBS | RESPIRATION RATE: 16 BRPM | OXYGEN SATURATION: 98 % | SYSTOLIC BLOOD PRESSURE: 124 MMHG | HEART RATE: 82 BPM | BODY MASS INDEX: 25.75 KG/M2 | DIASTOLIC BLOOD PRESSURE: 82 MMHG

## 2021-05-17 DIAGNOSIS — C74.92 MALIGNANT NEOPLASM OF ADRENAL GLAND, LEFT (H): Primary | ICD-10-CM

## 2021-05-17 PROCEDURE — 99214 OFFICE O/P EST MOD 30 MIN: CPT | Performed by: INTERNAL MEDICINE

## 2021-05-17 PROCEDURE — 999N001193 HC VIDEO/TELEPHONE VISIT; NO CHARGE

## 2021-05-17 PROCEDURE — G0463 HOSPITAL OUTPT CLINIC VISIT: HCPCS

## 2021-05-17 ASSESSMENT — PAIN SCALES - GENERAL: PAINLEVEL: NO PAIN (0)

## 2021-05-17 NOTE — PROGRESS NOTES
"MEDICAL ONCOLOGY CLINIC NOTE    REFERRING PROVIDER: Warren Mansfield MD from [facility] Urologic Oncology clinic.      REASON FOR CURRENT VISIT: Follow-up for adrenocortical carcinoma, currently on adjuvant mitotane    Transfer of care from Dr. Cintron to Dr. Shaw    ONCOLOGIC HISTORY:  1. 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   - CT abdomen and pelvis stone protocol without contrast 5 8018 showed \"9.2 x 8 cm heterogeneous right upper quadrant mass with small foci of calcification within it which is likely arising from the adrenal gland though inseparable from liver and upper pole of right kidney. It is incompletely evaluated on this noncontrast study. 3 x 2.6 cm mass right lobe of liver on image #85 also incompletely evaluated. 6 x 4 x 4 mm upper third left ureteral obstructing stone with mild to moderate secondary hydronephrosis. Collection of stones at lower pole left kidney extending over an area of approximately 6 x 7 x 4 mm. Additional nonobstructing 1 mm stone upper pole left kidney. 2 mm nonobstructing stone noted upper pole right kidney with no hydronephrosis on the right. Postop change consistent with gastric bypass. Noncontrast images of spleen, left adrenal gland, gallbladder, and pancreas unremarkable. No aneurysm. No adenopathy.\"  - Seen by Dr. Delvalle at [facility] Urology clinic. Referred to Dr. Alvino Patel and then Dr. Warren Mansfield.  - Endocrinology team directed workup showed high DHEAS level of 740 pre-surgery.   - Right open adrenalectomy and hepatic mobilization performed by Dr. Warren Mansfield on 6/25/2018. Pathology showed adrenocortical carcinoma measuring 11.3 cm, weighing 413 g with extension into or through the adrenal capsule negative lymphovascular invasion, tumor necrosis present, margins involved " by tumor, no regional lymph nodes identified, pathologic stage T2 NX.  pathology review reveals low grade ACC.  - DHEAS normalized postsurgery.   - Adjuvant Mitotane started on 7/24/18. Dose increased to 2000mg TID around 10/23/18 due to persistently low troughs. Held 1/16/19 for two weeks and resumed 1/30 at 1000 mg po BID due to very poor tolerance of higher doses. Highest mitotane level was 10.2 on 2/11/19. Mitotane troughs did not rise above 10 despite increase in dose to 1500 BID and then 1500+2000. Started 2000mg BID on 10/28/20. Increased to [phone] since around Christmas 2020.   - Saw radiation oncology at [facility], radiation oncology at [facility], as well as endocrine oncology (Dr. Huertas) at [facility].   - S/p adjuvant RT by Dr. Monsivais - 5040 cGy over 30 fr (8/24/18-10/6/18).  - 2/11/19: OSH CT C/A/P and MRI brain with contrast - no evidence of disease recurrence.   - 06/08/20, 09/14/20, 12/7/20, 3/16/2021: CT C/A/P with contrast - AFUA.    INTERVAL HISTORY:  Ms. Rodríguez is a 35-year-old lady with history notable for right-sided functioning adrenocortical carcinoma (diagnosed in May 2018), who was  on adjuvant mitotane. Her mitotane [phone] since around Christmas 2020.  She was under care of my colleague - Dr. Cintron and is transferring care as Dr. Cintron is leaving.   She stopped the mitotane on 5/4/21 after discussion with Dr. Hilton, endocrinologic oncologist from the [facility], she was routinely following.  After stopping mitotane she endorses significant improvement in her symptoms, though states she still have some tremors and problems with sleeping.  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  She is excited to start working, though endorses that is less energy and time to spend with her kids.  Her weight is stable, blood pressures are normal.   Continues hydrocortisone 25mg QAM, 15mg  early PM; fluodrocortisone 0.1mg/day and levothyroxine to 125 mcg/day and then 2 tablets on . She also follows with Dr. Veena Jaquez in our endocrinology division.     REVIEW OF SYSTEMS: 14 point ROS negative other than the symptoms noted above in the HPI.    PAST MEDICAL HISTORY:  Past Medical History:   Diagnosis Date     Adrenal cortex cancer, right (H) 2018    Resected 18, Dr. Mansfield.     Adrenal mass (H)      Chocolate cyst of ovary      History of miscarriage      Malignant neoplasm of cortex of right adrenal gland (H) 2018    EOTD; 19.  Check in May. SCP started.  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) 2018   Cushing's syndrome, secondary to #1 (300 mcg/24 hr); Rx hydrocortisone 20 + 10 post op   Post operative defect right hemidiaphragm with ?worsening right effussion, not present preop   Currently on mitotane, 500 mg, BID x 1 week + hydrocortisone     MTHFR mutation (H)    MHTFR mutation with h/o mutiple miscarriages.    PAST SURGICAL HISTORY:   Past Surgical History:   Procedure Laterality Date     ADRENALECTOMY Right 2018    Procedure: ADRENALECTOMY;  Right Adrenalectomy,  Embolize Liver , Anesthesia Block;  Surgeon: Warren Mansfield MD;  Location: [facility] OR     ADRENALECTOMY        SECTION      twice      SECTION      2     EMBOLECTOMY ABDOMEN N/A 2018    Procedure: EMBOLECTOMY ABDOMEN;;  Surgeon: Ector Mcintyre MD;  Location: [facility] OR     EXTRACORPOREAL SHOCK WAVE LITHOTRIPSY (ESWL)       GASTRIC BYPASS        SOCIAL HISTORY:   Social History     Tobacco Use     Smoking status: Never Smoker     Smokeless tobacco: Never Used   Substance Use Topics     Alcohol use: Yes     Comment: occ.     Drug use: No     FAMILY HISTORY:   Family History   Problem Relation Age of Onset     Depression Paternal Grandmother      ALLERGIES:   Allergies   Allergen Reactions     Bee Venom  Swelling     Ibuprofen Hives and Swelling     CURRENT MEDICATIONS:   Current Outpatient Medications   Medication Sig     acetaminophen (TYLENOL) 325 MG tablet Take 2 tablets (650 mg) by mouth every 4 hours as needed for other (multimodal surgical pain management along with NSAIDS and opioid medication as indicated based on pain control and physical function.)     buPROPion (WELLBUTRIN) 100 MG tablet TAKE 1 TABLET (100 MG TOTAL) BY MOUTH 2 (TWO) TIMES A DAY.     calcium carbonate 500 mg, elemental, (OSCAL) 500 MG tablet Take 1 tablet (500 mg) by mouth 2 times daily     cholecalciferol (VITAMIN D-1000 MAX ST) 1000 units TABS Take 2,000 Units by mouth every morning      clonazePAM (KLONOPIN) 0.5 MG tablet Take 1 tablet (0.5 mg) by mouth 2 times daily as needed for anxiety     EPINEPHrine (EPIPEN/ADRENACLICK/OR ANY BX GENERIC EQUIV) 0.3 MG/0.3ML injection 2-pack As directed for bee stings     ferrous fumarate 65 mg, [place]. FE,-Vitamin C 125 mg (VITRON-C)  MG TABS tablet Take 1 tablet by mouth 3 times daily     fludrocortisone (FLORINEF) 0.1 MG tablet Take 2 tablets (0.2 mg) by mouth daily     FLUoxetine (PROZAC) 40 MG capsule Take 60 mg by mouth daily      gabapentin (NEURONTIN) 300 MG capsule Take 3 capsules (900 mg) by mouth 3 times daily     hydrocortisone (CORTEF) 10 MG tablet Take 3 tablets (30 MG) every morning and take 2 tablets (20 MG) by mouth every day at 2 PM. Additional as directed.     levothyroxine (SYNTHROID/LEVOTHROID) 125 MCG tablet Take 1 tablet (125 mcg) by mouth daily (Patient taking differently: Take 125 mcg by mouth daily On Sunday's - 250 mg)     Multiple Vitamins-Calcium (ONE-A-DAY WOMENS PO) Take 2 tablets by mouth every morning      ondansetron (ZOFRAN) 8 MG tablet Take 1 tablet (8 mg) by mouth every 8 hours as needed for nausea     ondansetron (ZOFRAN) 8 MG tablet Take 1 tablet (8 mg) by mouth every 8 hours as needed for nausea     prochlorperazine (COMPAZINE) 5 MG tablet Take 1 tablet (5  mg) by mouth every 8 hours as needed for nausea or vomiting     temazepam (RESTORIL) 15 MG capsule Take 1 capsule (15 mg) by mouth nightly as needed for sleep     traZODone (DESYREL) 50 MG tablet Take 1 tablet (50 mg) by mouth At Bedtime     UNABLE TO FIND medical cannabis (Patient's own supply. Not a prescription)     blood glucose (NO BRAND SPECIFIED) test strip Use to test blood sugar as directed. (Patient not taking: Reported on 3/17/2021)     blood glucose monitoring (NO BRAND SPECIFIED) meter device kit Use to test blood sugar a the time you experience symptoms of a low BG. (Patient not taking: Reported on 3/17/2021)     diazepam (VALIUM) 5 MG tablet Take 1 tablet (5 mg) by mouth once as needed for anxiety (MRI claustrophobia management) (Patient not taking: Reported on 3/17/2021)     No current facility-administered medications for this visit.         PHYSICAL EXAMINATION:  Vital signs: /82   Pulse 82   Temp 98.2  F (36.8  C) (Oral)   Resp 16   Wt 68 kg (150 lb)   SpO2 98%   BMI 25.75 kg/m     Gen: NAD, sitting in the chair  HEENT: Moist oral mucosa, supple neck, no LAD  Cards: RRR, no RMG, S1/S2 normal  Pulm: CTABL, no wheezing or rhonchi  Abdo: Soft, nontender, nondistended, positive bowel sounds, no hepatosplenomegaly  Neuro: No gross neurologic deficits are noted  Extr: No edema bilaterally, preserved range of motion  Psych: Appropriate behavior    LABORATORY DATA:      Lab Test 05/10/21  1721 04/13/21  1442 03/17/21  1135   WBC 3.8* 4.4 4.7   HGB 14.3 14.1 14.7   HCT 42.1 42.0 44.8   MCV 95 95 97   MCHC 34.0 33.6 32.8   RDW 13.7 13.0 13.8    180 186        Last Comprehensive Metabolic Panel:  Sodium   Date Value Ref Range Status   05/10/2021 140 133 - 144 mmol/L Final   04/13/2021 140 133 - 144 mmol/L Final   03/17/2021 140 133 - 144 mmol/L Final     Potassium   Date Value Ref Range Status   05/10/2021 3.7 3.4 - 5.3 mmol/L Final   04/13/2021 4.0 3.4 - 5.3 mmol/L Final   03/17/2021 3.0  (L) 3.4 - 5.3 mmol/L Final     Chloride   Date Value Ref Range Status   05/10/2021 107 94 - 109 mmol/L Final   04/13/2021 109 94 - 109 mmol/L Final   03/17/2021 108 94 - 109 mmol/L Final     Carbon Dioxide   Date Value Ref Range Status   05/10/2021 26 20 - 32 mmol/L Final   04/13/2021 26 20 - 32 mmol/L Final   03/17/2021 25 20 - 32 mmol/L Final     Anion Gap   Date Value Ref Range Status   05/10/2021 8 3 - 14 mmol/L Final   04/13/2021 5 3 - 14 mmol/L Final   03/17/2021 7 3 - 14 mmol/L Final     Glucose   Date Value Ref Range Status   05/10/2021 138 (H) 70 - 99 mg/dL Final   04/13/2021 87 70 - 99 mg/dL Final   03/17/2021 116 (H) 70 - 99 mg/dL Final     Urea Nitrogen   Date Value Ref Range Status   05/10/2021 9 7 - 30 mg/dL Final   04/13/2021 12 7 - 30 mg/dL Final   03/17/2021 7 7 - 30 mg/dL Final     Creatinine   Date Value Ref Range Status   05/10/2021 0.67 0.52 - 1.04 mg/dL Final   04/13/2021 0.88 0.52 - 1.04 mg/dL Final   03/17/2021 0.67 0.52 - 1.04 mg/dL Final     GFR Estimate   Date Value Ref Range Status   05/10/2021 >90 >60 mL/min/[1.73_m2] Final     Comment:     Non  GFR Calc  Starting 12/18/2018, serum creatinine based estimated GFR (eGFR) will be   calculated using the Chronic Kidney Disease Epidemiology Collaboration   (CKD-EPI) equation.     04/13/2021 84 >60 mL/min/[1.73_m2] Final     Comment:     Non  GFR Calc  Starting 12/18/2018, serum creatinine based estimated GFR (eGFR) will be   calculated using the Chronic Kidney Disease Epidemiology Collaboration   (CKD-EPI) equation.     03/17/2021 >90 >60 mL/min/[1.73_m2] Final     Comment:     Non  GFR Calc  Starting 12/18/2018, serum creatinine based estimated GFR (eGFR) will be   calculated using the Chronic Kidney Disease Epidemiology Collaboration   (CKD-EPI) equation.       Calcium   Date Value Ref Range Status   05/10/2021 7.5 (L) 8.5 - 10.1 mg/dL Final   04/13/2021 7.8 (L) 8.5 - 10.1 mg/dL Final    03/17/2021 8.0 (L) 8.5 - 10.1 mg/dL Final     Bilirubin Total   Date Value Ref Range Status   05/10/2021 0.3 0.2 - 1.3 mg/dL Final   04/13/2021 0.2 0.2 - 1.3 mg/dL Final   03/17/2021 0.2 0.2 - 1.3 mg/dL Final     Alkaline Phosphatase   Date Value Ref Range Status   05/10/2021 82 40 - 150 U/L Final   04/13/2021 82 40 - 150 U/L Final   03/17/2021 79 40 - 150 U/L Final     ALT   Date Value Ref Range Status   05/10/2021 20 0 - 50 U/L Final   04/13/2021 21 0 - 50 U/L Final   03/17/2021 25 0 - 50 U/L Final     AST   Date Value Ref Range Status   05/10/2021 18 0 - 45 U/L Final   04/13/2021 15 0 - 45 U/L Final   03/17/2021 19 0 - 45 U/L Final       TSH   Date Value Ref Range Status   05/10/2021 0.18 (L) 0.40 - 4.00 mU/L Final   04/13/2021 0.39 (L) 0.40 - 4.00 mU/L Final   03/17/2021 0.18 (L) 0.40 - 4.00 mU/L Final       No results for input(s): CEA in the last 50672 hours.  Results for orders placed or performed in visit on 03/16/21   CT Chest/Abdomen/Pelvis w Contrast    Narrative    EXAMINATION: CT CHEST/ABDOMEN/PELVIS W CONTRAST, 3/16/2021 12:26 PM    TECHNIQUE: Helical CT images from the thoracic inlet through the  symphysis pubis were obtained with intravenous contrast. Contrast  dose: Isovue 370 93cc    COMPARISON: 12/7/2020 CT chest/abdomen/pelvis    HISTORY: h/o adrenocortical ca, s/p adrenalectomy and adjuvant RT in  2018, now s/p \R\3 years of adjuvant mitotane.; Malignant neoplasm  of cortex of right adrenal gland (H)    FINDINGS:    Chest: No pleural effusion or pneumothorax. No focal consolidation.  Linear fibroatelectasis in the right lower lobe with associated  subpleural reticular abnormality in the posterior right costophrenic  angle, unchanged since prior. No suspicious pulmonary nodule. The  central tracheobronchial tree is clear.    Normal heart size. No pericardial effusion. Normal caliber ascending  aorta and main pulmonary artery. No central pulmonary embolism. Trace  residual thymic tissue in the  anterior mediastinum. No thoracic  lymphadenopathy.    Abdomen and pelvis: Unchanged hemangioma with peripheral nodular  enhancement at the junction of hepatic segments 5 and 6. No focal  suspicious hepatic lesion. The gallbladder, biliary tree, pancreas,  spleen, and left adrenal gland are normal. Surgical changes of right  adrenalectomy. Continued cortical volume loss and hypoenhancement in  the upper pole of the right kidney. The kidneys are otherwise  unremarkable. No hydronephrosis, hydroureter, or urinary tract stone.  The bladder and uterus are normal. Well-positioned intrauterine  device. Bilateral ovarian cystic foci, measuring up to 2.0 cm on the  right and 2.4 cm on the left, compatible with follicles. Rim  enhancement of a smaller cystic structure arising from the left ovary,  likely a corpus luteum. No free fluid or free air. No bowel  obstruction or inflammation. Moderate colonic stool burden. Normal  appendix. Surgical changes of Tanika-en-Y gastric bypass. Unchanged  lobule of fat with irregular soft tissue in the pelvis adjacent to the  rectosigmoid colon. The major abdominal vasculature is patent. No  abdominal or pelvic lymphadenopathy.    Bones and soft tissues: Tiny fat-containing umbilical hernia.  Unchanged soft tissue nodule in the deep subcutaneous fat of the  anterior inferior abdominal wall superficial to the rectus abdominous  musculature, likely postsurgical. No acute or aggressive osseus  abnormality.      Impression    IMPRESSION:   1. Stable changes of right adrenalectomy and adjuvant radiation  therapy without evidence of residual/recurrent or metastatic disease  in the chest, abdomen, or pelvis.  2. Stable cortical volume loss and hypoenhancement in the upper pole  of the right kidney, likely sequela of prior radiation.    MARNIE LEBRON DO      Ref. Range 12/15/2020 11:45 1/28/2021 16:35 3/25/2021 14:51 4/29/21   Cortisol Free Duration Urine Latest Units: h 24 24 24 24   Cortisol  Free Urine Latest Ref Range: <=45.0 ug/d 86.8 (H) 102.6 (H) 158.2 (H) 111.2   Cortisol Free Urine Random Latest Units: ug/L 48.20 45.10 90.40 67.40   Cortisol ug/g creatinine Latest Units: ug/g CRT 86.07 79.12 118.95 91.08   Creatinine Urine/Volume Latest Units: mg/dL 56 57 76 74   Creatinine Urine/24hr Latest Ref Range: 700 - 1,600 mg/d 1,008 1,297 1,330 20890       DHEAS was 740 on 6/5/18, and has been <15 on 7/12/18, 8/20/18, 9/19/18, 10/23/18, 11/27/18, 1/9/19, 5/8/19, 6/18/19, 7/17/19, 11/15/19, 12/12/19, 1/22/20, 3/30/20 and 6/8/20, 7/21/20, 8/5/20, 1/20/21, 5/10/21.  Mitotane level (target 14-20): 1.8ng/dl on 8/20/18, 3.5 on 10/25/18, 6.2 on 12/4/18, 8.1 on 1/9/19 and 10.2 on 2/11/19, 14.2 on 6/18/19. 10.8 on 12/12/19.  10 on 1/24/20. 8.9 on 5/14/20. 8.6 on 7/21/20, 8.3 on 8/5/20, 14.0 in 1/2021, 10.5 on 5/10/21.  Labs from [facility] 9/24/19 - WBC 4200, ANC 2600, Hb 10.4, Platelets 218K, lytes WNL, creat 0.61, Calcium 8, albumin 3.5, LFTs normal, mitotane level 18.8, cortisol 25.8, ACTH<5, aldosterone 4.9, free T4 1.17, TSH 0.07, DHEAS <15, renin plasma 10.6.     IMAGING DATA:  As above.    ASSESSMENT AND PLAN: A 35-year-old lady with right-sided functioning high-risk adrenocortical carcinoma.     Adrenocortical carcinoma:  - Started on adjuvant mitotane in July 2018 based on her presentation and tumor characteristics including invasion of the adrenal capsule, high mitotic rate, possibly positive margins, and high Ki67, which could result in a rate of recurrence as high as 40%.    The mitotane was stopped by Dr. Hilton and endocrinologic oncologist from [facility].  She had a level drawn on 5/10/2021, 6 days after discontinuation of mitotane with level of 10.5.  She informs that Dr. Liang would like to continue with mitotane levels every month 5:45 PM and interval imaging every 3 months from now .  Thus we will continue to monitor every 4 week  mitotane level, CBC, comprehensive panel, TSH, FT4,  DHEAS, ACTH, periodic cholesterol panel, and 24-hour Urine Free Cortisol.   We will plan with monthly follow-up with midlevel providers during this period of time, CT chest abdomen pelvis in 1 month (the last scan was done 2 months ago) and another scan in 3 months, followed by appointment with Dr. Shaw in 4 months from now.  She is on hydrocortisone 25/15 and that was monitored from [facility].  Taking in account that she was on mitotane for last 3 years the taper probably will be prolonged..  We will follow for the call for hydrocortisone tapering from the [facility] per Dr. Liang.    Plan:  1.  CT chest abdomen pelvis in 1 month  2.  Monthly appointment with midlevel provider and following labs (mitotane level, CBC, CMP, TSH, FT4, DHEAS, ACTH,.  A cholesterol panel, 24 urine free cortisol)   3.  CT chest abdomen pelvis in 3 months  4.  Follow-up with Dr. Shaw a month after the CT is obtained    Discussed with Dr. Colin Vegas MD  Hem/Onc fellow

## 2021-05-17 NOTE — LETTER
"    5/17/2021         RE: Suzy Rodríguez  [address]  [facility] [address]        Dear Colleague,    Thank you for referring your patient, Szuy Rodríguez, to the [facility]. Please see a copy of my visit note below.    MEDICAL ONCOLOGY CLINIC NOTE    REFERRING PROVIDER: Warren Mansfield MD from [facility].      REASON FOR CURRENT VISIT: Follow-up for adrenocortical carcinoma, currently on adjuvant mitotane    Transfer of care from Dr. Cintron to Dr. Shaw    ONCOLOGIC HISTORY:  1. 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   - CT abdomen and pelvis stone protocol without contrast 5 5145 showed \"9.2 x 8 cm heterogeneous right upper quadrant mass with small foci of calcification within it which is likely arising from the adrenal gland though inseparable from liver and upper pole of right kidney. It is incompletely evaluated on this noncontrast study. 3 x 2.6 cm mass right lobe of liver on image #85 also incompletely evaluated. 6 x 4 x 4 mm upper third left ureteral obstructing stone with mild to moderate secondary hydronephrosis. Collection of stones at lower pole left kidney extending over an area of approximately 6 x 7 x 4 mm. Additional nonobstructing 1 mm stone upper pole left kidney. 2 mm nonobstructing stone noted upper pole right kidney with no hydronephrosis on the right. Postop change consistent with gastric bypass. Noncontrast images of spleen, left adrenal gland, gallbladder, and pancreas unremarkable. No aneurysm. No adenopathy.\"  - Seen by Dr. Delvalle at [facility]. Referred to Dr. Alvino Patel and then Dr. Warren Mansfield.  - Endocrinology team directed workup showed high DHEAS level of 740 pre-surgery.   - Right open adrenalectomy and hepatic " mobilization performed by Dr. Kelley Weight on 6/25/2018. Pathology showed adrenocortical carcinoma measuring 11.3 cm, weighing 413 g with extension into or through the adrenal capsule negative lymphovascular invasion, tumor necrosis present, margins involved by tumor, no regional lymph nodes identified, pathologic stage T2 NX.  pathology review reveals low grade ACC.  - DHEAS normalized postsurgery.   - Adjuvant Mitotane started on 7/24/18. Dose increased to 2000mg TID around 10/23/18 due to persistently low troughs. Held 1/16/19 for two weeks and resumed 1/30 at 1000 mg po BID due to very poor tolerance of higher doses. Highest mitotane level was 10.2 on 2/11/19. Mitotane troughs did not rise above 10 despite increase in dose to 1500 BID and then 1500+2000. Started 2000mg BID on 10/28/20. Increased to [phone] since around Christmas 2020.   - Saw radiation oncology at [facility], radiation oncology at [facility], as well as endocrine oncology (Dr. Huertas) at [facility].   - S/p adjuvant RT by Dr. Monsivais - 5040 cGy over 30 fr (8/24/18-10/6/18).  - 2/11/19: OSH CT C/A/P and MRI brain with contrast - no evidence of disease recurrence.   - 06/08/20, 09/14/20, 12/7/20, 3/16/2021: CT C/A/P with contrast - AFUA.    INTERVAL HISTORY:  Ms. Rodríguez is a 35-year-old lady with history notable for right-sided functioning adrenocortical carcinoma (diagnosed in May 2018), who was  on adjuvant mitotane. Her mitotane [phone] since around Christmas 2020.  She was under care of my colleague - Dr. Cintron and is transferring care as Dr. Cintron is leaving.   She stopped the mitotane on 5/4/21 after discussion with Dr. Hilton, endocrinologic oncologist from the [facility], she was routinely following.  After stopping mitotane she endorses significant improvement in her symptoms, though states she still have some tremors and problems with sleeping.  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  She is excited to start working, though endorses that is less energy and time to spend with her kids.  Her weight is stable, blood pressures are normal.   Continues hydrocortisone 25mg QAM, 15mg early PM; fluodrocortisone 0.1mg/day and levothyroxine to 125 mcg/day and then 2 tablets on . She also follows with Dr. Veena Jaquez in our endocrinology division.     REVIEW OF SYSTEMS: 14 point ROS negative other than the symptoms noted above in the HPI.    PAST MEDICAL HISTORY:  Past Medical History:   Diagnosis Date     Adrenal cortex cancer, right (H) 2018    Resected 18, Dr. Mansfield.     Adrenal mass (H)      Chocolate cyst of ovary      History of miscarriage      Malignant neoplasm of cortex of right adrenal gland (H) 2018    EOTD; 19.  Check in May. SCP started.  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) 2018   Cushing's syndrome, secondary to #1 (300 mcg/24 hr); Rx hydrocortisone 20 + 10 post op   Post operative defect right hemidiaphragm with ?worsening right effussion, not present preop   Currently on mitotane, 500 mg, BID x 1 week + hydrocortisone     MTHFR mutation (H)    MHTFR mutation with h/o mutiple miscarriages.    PAST SURGICAL HISTORY:   Past Surgical History:   Procedure Laterality Date     ADRENALECTOMY Right 2018    Procedure: ADRENALECTOMY;  Right Adrenalectomy,  Embolize Liver , Anesthesia Block;  Surgeon: Warren Mansfield MD;  Location: [facility] OR     ADRENALECTOMY        SECTION      twice      SECTION      2     EMBOLECTOMY ABDOMEN N/A 2018    Procedure: EMBOLECTOMY ABDOMEN;;  Surgeon: Ector Mcintyre MD;  Location: [facility] OR     EXTRACORPOREAL SHOCK WAVE LITHOTRIPSY (ESWL)       GASTRIC BYPASS        SOCIAL HISTORY:   Social History     Tobacco Use     Smoking status: Never Smoker     Smokeless  tobacco: Never Used   Substance Use Topics     Alcohol use: Yes     Comment: occ.     Drug use: No     FAMILY HISTORY:   Family History   Problem Relation Age of Onset     Depression Paternal Grandmother      ALLERGIES:   Allergies   Allergen Reactions     Bee Venom Swelling     Ibuprofen Hives and Swelling     CURRENT MEDICATIONS:   Current Outpatient Medications   Medication Sig     acetaminophen (TYLENOL) 325 MG tablet Take 2 tablets (650 mg) by mouth every 4 hours as needed for other (multimodal surgical pain management along with NSAIDS and opioid medication as indicated based on pain control and physical function.)     buPROPion (WELLBUTRIN) 100 MG tablet TAKE 1 TABLET (100 MG TOTAL) BY MOUTH 2 (TWO) TIMES A DAY.     calcium carbonate 500 mg, elemental, (OSCAL) 500 MG tablet Take 1 tablet (500 mg) by mouth 2 times daily     cholecalciferol (VITAMIN D-1000 MAX ST) 1000 units TABS Take 2,000 Units by mouth every morning      clonazePAM (KLONOPIN) 0.5 MG tablet Take 1 tablet (0.5 mg) by mouth 2 times daily as needed for anxiety     EPINEPHrine (EPIPEN/ADRENACLICK/OR ANY BX GENERIC EQUIV) 0.3 MG/0.3ML injection 2-pack As directed for bee stings     ferrous fumarate 65 mg, [place]. FE,-Vitamin C 125 mg (VITRON-C)  MG TABS tablet Take 1 tablet by mouth 3 times daily     fludrocortisone (FLORINEF) 0.1 MG tablet Take 2 tablets (0.2 mg) by mouth daily     FLUoxetine (PROZAC) 40 MG capsule Take 60 mg by mouth daily      gabapentin (NEURONTIN) 300 MG capsule Take 3 capsules (900 mg) by mouth 3 times daily     hydrocortisone (CORTEF) 10 MG tablet Take 3 tablets (30 MG) every morning and take 2 tablets (20 MG) by mouth every day at 2 PM. Additional as directed.     levothyroxine (SYNTHROID/LEVOTHROID) 125 MCG tablet Take 1 tablet (125 mcg) by mouth daily (Patient taking differently: Take 125 mcg by mouth daily On Sunday's - 250 mg)     Multiple Vitamins-Calcium (ONE-A-DAY WOMENS PO) Take 2 tablets by mouth every morning       ondansetron (ZOFRAN) 8 MG tablet Take 1 tablet (8 mg) by mouth every 8 hours as needed for nausea     ondansetron (ZOFRAN) 8 MG tablet Take 1 tablet (8 mg) by mouth every 8 hours as needed for nausea     prochlorperazine (COMPAZINE) 5 MG tablet Take 1 tablet (5 mg) by mouth every 8 hours as needed for nausea or vomiting     temazepam (RESTORIL) 15 MG capsule Take 1 capsule (15 mg) by mouth nightly as needed for sleep     traZODone (DESYREL) 50 MG tablet Take 1 tablet (50 mg) by mouth At Bedtime     UNABLE TO FIND medical cannabis (Patient's own supply. Not a prescription)     blood glucose (NO BRAND SPECIFIED) test strip Use to test blood sugar as directed. (Patient not taking: Reported on 3/17/2021)     blood glucose monitoring (NO BRAND SPECIFIED) meter device kit Use to test blood sugar a the time you experience symptoms of a low BG. (Patient not taking: Reported on 3/17/2021)     diazepam (VALIUM) 5 MG tablet Take 1 tablet (5 mg) by mouth once as needed for anxiety (MRI claustrophobia management) (Patient not taking: Reported on 3/17/2021)     No current facility-administered medications for this visit.         PHYSICAL EXAMINATION:  Vital signs: /82   Pulse 82   Temp 98.2  F (36.8  C) (Oral)   Resp 16   Wt 68 kg (150 lb)   SpO2 98%   BMI 25.75 kg/m     Gen: NAD, sitting in the chair  HEENT: Moist oral mucosa, supple neck, no LAD  Cards: RRR, no RMG, S1/S2 normal  Pulm: CTABL, no wheezing or rhonchi  Abdo: Soft, nontender, nondistended, positive bowel sounds, no hepatosplenomegaly  Neuro: No gross neurologic deficits are noted  Extr: No edema bilaterally, preserved range of motion  Psych: Appropriate behavior    LABORATORY DATA:      Lab Test 05/10/21  1721 04/13/21  1442 03/17/21  1135   WBC 3.8* 4.4 4.7   HGB 14.3 14.1 14.7   HCT 42.1 42.0 44.8   MCV 95 95 97   MCHC 34.0 33.6 32.8   RDW 13.7 13.0 13.8    180 186        Last Comprehensive Metabolic Panel:  Sodium   Date Value Ref Range  Status   05/10/2021 140 133 - 144 mmol/L Final   04/13/2021 140 133 - 144 mmol/L Final   03/17/2021 140 133 - 144 mmol/L Final     Potassium   Date Value Ref Range Status   05/10/2021 3.7 3.4 - 5.3 mmol/L Final   04/13/2021 4.0 3.4 - 5.3 mmol/L Final   03/17/2021 3.0 (L) 3.4 - 5.3 mmol/L Final     Chloride   Date Value Ref Range Status   05/10/2021 107 94 - 109 mmol/L Final   04/13/2021 109 94 - 109 mmol/L Final   03/17/2021 108 94 - 109 mmol/L Final     Carbon Dioxide   Date Value Ref Range Status   05/10/2021 26 20 - 32 mmol/L Final   04/13/2021 26 20 - 32 mmol/L Final   03/17/2021 25 20 - 32 mmol/L Final     Anion Gap   Date Value Ref Range Status   05/10/2021 8 3 - 14 mmol/L Final   04/13/2021 5 3 - 14 mmol/L Final   03/17/2021 7 3 - 14 mmol/L Final     Glucose   Date Value Ref Range Status   05/10/2021 138 (H) 70 - 99 mg/dL Final   04/13/2021 87 70 - 99 mg/dL Final   03/17/2021 116 (H) 70 - 99 mg/dL Final     Urea Nitrogen   Date Value Ref Range Status   05/10/2021 9 7 - 30 mg/dL Final   04/13/2021 12 7 - 30 mg/dL Final   03/17/2021 7 7 - 30 mg/dL Final     Creatinine   Date Value Ref Range Status   05/10/2021 0.67 0.52 - 1.04 mg/dL Final   04/13/2021 0.88 0.52 - 1.04 mg/dL Final   03/17/2021 0.67 0.52 - 1.04 mg/dL Final     GFR Estimate   Date Value Ref Range Status   05/10/2021 >90 >60 mL/min/[1.73_m2] Final     Comment:     Non  GFR Calc  Starting 12/18/2018, serum creatinine based estimated GFR (eGFR) will be   calculated using the Chronic Kidney Disease Epidemiology Collaboration   (CKD-EPI) equation.     04/13/2021 84 >60 mL/min/[1.73_m2] Final     Comment:     Non  GFR Calc  Starting 12/18/2018, serum creatinine based estimated GFR (eGFR) will be   calculated using the Chronic Kidney Disease Epidemiology Collaboration   (CKD-EPI) equation.     03/17/2021 >90 >60 mL/min/[1.73_m2] Final     Comment:     Non  GFR Calc  Starting 12/18/2018, serum creatinine  based estimated GFR (eGFR) will be   calculated using the Chronic Kidney Disease Epidemiology Collaboration   (CKD-EPI) equation.       Calcium   Date Value Ref Range Status   05/10/2021 7.5 (L) 8.5 - 10.1 mg/dL Final   04/13/2021 7.8 (L) 8.5 - 10.1 mg/dL Final   03/17/2021 8.0 (L) 8.5 - 10.1 mg/dL Final     Bilirubin Total   Date Value Ref Range Status   05/10/2021 0.3 0.2 - 1.3 mg/dL Final   04/13/2021 0.2 0.2 - 1.3 mg/dL Final   03/17/2021 0.2 0.2 - 1.3 mg/dL Final     Alkaline Phosphatase   Date Value Ref Range Status   05/10/2021 82 40 - 150 U/L Final   04/13/2021 82 40 - 150 U/L Final   03/17/2021 79 40 - 150 U/L Final     ALT   Date Value Ref Range Status   05/10/2021 20 0 - 50 U/L Final   04/13/2021 21 0 - 50 U/L Final   03/17/2021 25 0 - 50 U/L Final     AST   Date Value Ref Range Status   05/10/2021 18 0 - 45 U/L Final   04/13/2021 15 0 - 45 U/L Final   03/17/2021 19 0 - 45 U/L Final       TSH   Date Value Ref Range Status   05/10/2021 0.18 (L) 0.40 - 4.00 mU/L Final   04/13/2021 0.39 (L) 0.40 - 4.00 mU/L Final   03/17/2021 0.18 (L) 0.40 - 4.00 mU/L Final       No results for input(s): CEA in the last 83084 hours.  Results for orders placed or performed in visit on 03/16/21   CT Chest/Abdomen/Pelvis w Contrast    Narrative    EXAMINATION: CT CHEST/ABDOMEN/PELVIS W CONTRAST, 3/16/2021 12:26 PM    TECHNIQUE: Helical CT images from the thoracic inlet through the  symphysis pubis were obtained with intravenous contrast. Contrast  dose: Isovue 370 93cc    COMPARISON: 12/7/2020 CT chest/abdomen/pelvis    HISTORY: h/o adrenocortical ca, s/p adrenalectomy and adjuvant RT in  2018, now s/p \R\3 years of adjuvant mitotane.; Malignant neoplasm  of cortex of right adrenal gland (H)    FINDINGS:    Chest: No pleural effusion or pneumothorax. No focal consolidation.  Linear fibroatelectasis in the right lower lobe with associated  subpleural reticular abnormality in the posterior right costophrenic  angle, unchanged  since prior. No suspicious pulmonary nodule. The  central tracheobronchial tree is clear.    Normal heart size. No pericardial effusion. Normal caliber ascending  aorta and main pulmonary artery. No central pulmonary embolism. Trace  residual thymic tissue in the anterior mediastinum. No thoracic  lymphadenopathy.    Abdomen and pelvis: Unchanged hemangioma with peripheral nodular  enhancement at the junction of hepatic segments 5 and 6. No focal  suspicious hepatic lesion. The gallbladder, biliary tree, pancreas,  spleen, and left adrenal gland are normal. Surgical changes of right  adrenalectomy. Continued cortical volume loss and hypoenhancement in  the upper pole of the right kidney. The kidneys are otherwise  unremarkable. No hydronephrosis, hydroureter, or urinary tract stone.  The bladder and uterus are normal. Well-positioned intrauterine  device. Bilateral ovarian cystic foci, measuring up to 2.0 cm on the  right and 2.4 cm on the left, compatible with follicles. Rim  enhancement of a smaller cystic structure arising from the left ovary,  likely a corpus luteum. No free fluid or free air. No bowel  obstruction or inflammation. Moderate colonic stool burden. Normal  appendix. Surgical changes of Tanika-en-Y gastric bypass. Unchanged  lobule of fat with irregular soft tissue in the pelvis adjacent to the  rectosigmoid colon. The major abdominal vasculature is patent. No  abdominal or pelvic lymphadenopathy.    Bones and soft tissues: Tiny fat-containing umbilical hernia.  Unchanged soft tissue nodule in the deep subcutaneous fat of the  anterior inferior abdominal wall superficial to the rectus abdominous  musculature, likely postsurgical. No acute or aggressive osseus  abnormality.      Impression    IMPRESSION:   1. Stable changes of right adrenalectomy and adjuvant radiation  therapy without evidence of residual/recurrent or metastatic disease  in the chest, abdomen, or pelvis.  2. Stable cortical volume  loss and hypoenhancement in the upper pole  of the right kidney, likely sequela of prior radiation.    MARNIE LEBRON DO      Ref. Range 12/15/2020 11:45 1/28/2021 16:35 3/25/2021 14:51 4/29/21   Cortisol Free Duration Urine Latest Units: h 24 24 24 24   Cortisol Free Urine Latest Ref Range: <=45.0 ug/d 86.8 (H) 102.6 (H) 158.2 (H) 111.2   Cortisol Free Urine Random Latest Units: ug/L 48.20 45.10 90.40 67.40   Cortisol ug/g creatinine Latest Units: ug/g CRT 86.07 79.12 118.95 91.08   Creatinine Urine/Volume Latest Units: mg/dL 56 57 76 74   Creatinine Urine/24hr Latest Ref Range: 700 - 1,600 mg/d 1,008 1,297 1,330 62673       DHEAS was 740 on 6/5/18, and has been <15 on 7/12/18, 8/20/18, 9/19/18, 10/23/18, 11/27/18, 1/9/19, 5/8/19, 6/18/19, 7/17/19, 11/15/19, 12/12/19, 1/22/20, 3/30/20 and 6/8/20, 7/21/20, 8/5/20, 1/20/21, 5/10/21.  Mitotane level (target 14-20): 1.8ng/dl on 8/20/18, 3.5 on 10/25/18, 6.2 on 12/4/18, 8.1 on 1/9/19 and 10.2 on 2/11/19, 14.2 on 6/18/19. 10.8 on 12/12/19.  10 on 1/24/20. 8.9 on 5/14/20. 8.6 on 7/21/20, 8.3 on 8/5/20, 14.0 in 1/2021, 10.5 on 5/10/21.  Labs from [facility] 9/24/19 - WBC 4200, ANC 2600, Hb 10.4, Platelets 218K, lytes WNL, creat 0.61, Calcium 8, albumin 3.5, LFTs normal, mitotane level 18.8, cortisol 25.8, ACTH<5, aldosterone 4.9, free T4 1.17, TSH 0.07, DHEAS <15, renin plasma 10.6.     IMAGING DATA:  As above.    ASSESSMENT AND PLAN: A 35-year-old lady with right-sided functioning high-risk adrenocortical carcinoma.     Adrenocortical carcinoma:  - Started on adjuvant mitotane in July 2018 based on her presentation and tumor characteristics including invasion of the adrenal capsule, high mitotic rate, possibly positive margins, and high Ki67, which could result in a rate of recurrence as high as 40%.    The mitotane was stopped by Dr. Hilton and endocrinologic oncologist from [facility].  She had a level drawn on 5/10/2021, 6 days after discontinuation of  mitotane with level of 10.5.  She informs that Dr. Liang would like to continue with mitotane levels every month 5:45 PM and interval imaging every 3 months from now .  Thus we will continue to monitor every 4 week  mitotane level, CBC, comprehensive panel, TSH, FT4, DHEAS, ACTH, periodic cholesterol panel, and 24-hour Urine Free Cortisol.   We will plan with monthly follow-up with midlevel providers during this period of time, CT chest abdomen pelvis in 1 month (the last scan was done 2 months ago) and another scan in 3 months, followed by appointment with Dr. Shaw in 4 months from now.  She is on hydrocortisone 25/15 and that was monitored from [facility].  Taking in account that she was on mitotane for last 3 years the taper probably will be prolonged..  We will follow for the call for hydrocortisone tapering from the [facility] per Dr. Liang.    Plan:  1.  CT chest abdomen pelvis in 1 month  2.  Monthly appointment with midlevel provider and following labs (mitotane level, CBC, CMP, TSH, FT4, DHEAS, ACTH,.  A cholesterol panel, 24 urine free cortisol)   3.  CT chest abdomen pelvis in 3 months  4.  Follow-up with Dr. Shaw a month after the CT is obtained    Discussed with Dr. Colin Vegas MD  Hem/Onc fellow      Attestation signed by Amando Shaw MD at 5/20/2021  9:08 PM:  Physician Attestation   I, Amando Shaw MD, saw this patient and agree with the findings and plan of care as documented in the note.      Items personally reviewed/procedural attestation: vitals, labs, and plan.    Amando Shaw MD       Again, thank you for allowing me to participate in the care of your patient.        Sincerely,        Amando Shaw MD

## 2021-05-18 ENCOUNTER — PATIENT OUTREACH (OUTPATIENT)
Dept: ONCOLOGY | Facility: CLINIC | Age: 36
End: 2021-05-18

## 2021-05-19 NOTE — PROGRESS NOTES
TC to pt to discuss upcoming appts. Plan made to have scans and labs on 06/04 as planned and an in-person visit with Dr. Shaw on 06/07 @ 0830. Pt stated understanding of all and agreed to call clinic with any questions or concerns. Inbasket scheduling request sent to [facility] desk pool to make appt and notify pt once completed.

## 2021-05-26 ASSESSMENT — PATIENT HEALTH QUESTIONNAIRE - PHQ9
SUM OF ALL RESPONSES TO PHQ QUESTIONS 1-9: 20
SUM OF ALL RESPONSES TO PHQ QUESTIONS 1-9: 22

## 2021-05-27 ASSESSMENT — PATIENT HEALTH QUESTIONNAIRE - PHQ9
SUM OF ALL RESPONSES TO PHQ QUESTIONS 1-9: 23
SUM OF ALL RESPONSES TO PHQ QUESTIONS 1-9: 19
SUM OF ALL RESPONSES TO PHQ QUESTIONS 1-9: 19

## 2021-05-28 ASSESSMENT — ANXIETY QUESTIONNAIRES
GAD7 TOTAL SCORE: 12
GAD7 TOTAL SCORE: 9
GAD7 TOTAL SCORE: 16
GAD7 TOTAL SCORE: 19
GAD7 TOTAL SCORE: 21

## 2021-05-29 ENCOUNTER — RECORDS - HEALTHEAST (OUTPATIENT)
Dept: ADMINISTRATIVE | Facility: CLINIC | Age: 36
End: 2021-05-29

## 2021-05-29 NOTE — TELEPHONE ENCOUNTER
Name of form/paperwork: Other:  Medical work capacity form  Have you been seen for this request: N/A  Do we have the form: Yes- IN GREEN FOLDER IN [employer]'S BIN  When is form needed by: ASAP?  How would you like the form returned: FAXED  Fax Number: [phone]  Patient Notified form requests are processed in 3-5 business days: N/A  (If patient needs form sooner, please note that in this message.)  Okay to leave a detailed message? N/A

## 2021-05-29 NOTE — TELEPHONE ENCOUNTER
Refill Approved    Rx renewed per Medication Renewal Policy. Medication was last renewed on 11/21/18.    Sulma Bonilla, [facility] Triage/Med Refill 6/11/2019     Requested Prescriptions   Pending Prescriptions Disp Refills     FLUoxetine (PROZAC) 40 MG capsule [Pharmacy Med Name: FLUOXETINE HCL 40MG CAPS] 90 capsule 3     Sig: TAKE ONE CAPSULE BY MOUTH ONCE DAILY       SSRI Refill Protocol  Passed - 6/10/2019 12:50 PM        Passed - PCP or prescribing provider visit in last year     Last office visit with prescriber/PCP: 2/20/2019 Gillian Garzon MD OR same dept: 2/20/2019 Gillian Garzon MD OR same specialty: 2/20/2019 Gillian Garzon MD  Last physical: 3/24/2017 Last MTM visit: Visit date not found   Next visit within 3 mo: Visit date not found  Next physical within 3 mo: Visit date not found  Prescriber OR PCP: Gillian Garzon MD  Last diagnosis associated with med order: 1. Generalized anxiety disorder  - FLUoxetine (PROZAC) 40 MG capsule [Pharmacy Med Name: FLUOXETINE HCL 40MG CAPS]; TAKE ONE CAPSULE BY MOUTH ONCE DAILY  Dispense: 90 capsule; Refill: 3    2. Major depressive disorder, recurrent episode (H)  - FLUoxetine (PROZAC) 40 MG capsule [Pharmacy Med Name: FLUOXETINE HCL 40MG CAPS]; TAKE ONE CAPSULE BY MOUTH ONCE DAILY  Dispense: 90 capsule; Refill: 3    If protocol passes may refill for 12 months if within 3 months of last provider visit (or a total of 15 months).

## 2021-05-29 NOTE — TELEPHONE ENCOUNTER
Name of form/paperwork: Other:  Medical Work Capacity  Have you been seen for this request: N/A  Do we have the form: Yes- form was faxed to [phone]- this form was not completed by the provider.  Please complete and fax to the number below  When is form needed by: as soon as possible  How would you like the form returned: fax  Fax Number: [phone]  Patient Notified form requests are processed in 3-5 business days: No  (If patient needs form sooner, please note that in this message.)  Okay to leave a detailed message? Yes

## 2021-05-29 NOTE — TELEPHONE ENCOUNTER
Spoke with Suzy who stated her Oncologist would fill this form out. Faxed to [facility]. [phone]

## 2021-05-30 VITALS — HEIGHT: 64 IN | BODY MASS INDEX: 24.07 KG/M2 | WEIGHT: 141 LBS

## 2021-05-30 VITALS — WEIGHT: 139.7 LBS | HEIGHT: 64 IN | BODY MASS INDEX: 23.85 KG/M2

## 2021-05-30 VITALS — BODY MASS INDEX: 23.29 KG/M2 | WEIGHT: 136.4 LBS | HEIGHT: 64 IN

## 2021-05-30 NOTE — PROGRESS NOTES
Negative for microscopic hematuria via microscopy. Likely contaminated with squamous cells, no definitive organism. Patient updated by Xangat.

## 2021-05-30 NOTE — PATIENT INSTRUCTIONS - HE
Plan:  1. Welcome back, we'll check the remainder of your vitamin levels today and refer you to our dietician to discuss more plant based protein/baritric eating styles.    2. Your calcium levels were a bit low yesterday, start your calcium citrate again twice daily. Restart B12, vitamin and D3 at 5000IUs.    3. Your thyroid medication should be adjusted by your Endocrine clinic, you look to be over supplemented and improving levels back into the normal range should improve some of the tremor/anxiety and sleep disturbances you've experienced.    4. Consider meditative breathing and Lukasz Chi for low impact stress relieving options. [employer] has several good video.          [facility] Bariatric [facility]  Nutritional Guidelines  Gastric Bypass 18 Months Post Op and Beyond    General Guidelines and Helpful Hints:    Eat 3 meals per day + protein supplement(s). No snacks between meals.  o Do not skip meals.  This can cause overeating at the next meal and will prevent adequate protein and nutritional intake.    Aim for 60-80 grams of protein per day.  o Always eat your protein first. This assists with optimal nutrition and helps you stay full longer.  o Depending on your portion size, you may need to drink approved protein supplement between meals to achieve protein goals. Follow recommendations of your Dietitian.     Eat your protein first, and then follow with fiber.   o It is not necessary to count your fiber, but 15-20 grams per day is recommended.    o Add fiber by including fruits, vegetables, whole grains, and beans.     Portions should remain about 1 cup per meal. Use measuring cups to be accurate.    Continue to use saucer/salad plates, infant/toddler silverware to keep portion sizes small and take small bites.    Eat S-L-O-W-L-Y to make each meal last 20-30 minutes. Always stop eating when satisfied.    Continue to use caution with foods containing skins, peels or membranes. Chew well!    Aim for 64 oz. of  calorie-free fluids daily.  o Continue to avoid caffeine and carbonation. If you choose to drink alcohol, do so in moderation.   o Remember to avoid drinking during meals, 15-30 minutes before and 30 minutes after.    Exercise is gallegos for continued weight loss and weight maintenance. Aim for 30-60 minutes of physical activity most days of the week. Include cardiovascular and strength training.    If having trouble tolerating meat, try using a crock-pot, tinfoil tent, steamer or other moist cooking method to create tender meats. Add broth or low-fat gravy to help meat stay moist.     Avoid high sugar and high fat foods to prevent dumping syndrome.  o Check nutrition labels for less than 10 grams of sugar and less than 10 grams of fat per serving.    Continue Taking Vitamins/Minerals:  o 1000 mcg of Sublingual B-12 daily  o 1 Multivitamin with Iron twice daily (chewable or swallow tabs). Patch MD multiplus daily at night.   o 500-600 mg Calcium Citrate twice daily (chewable or swallow tabs)  o 5000 IU Vitamin D3 daily    Sample Grocery List    Protein:    Fat free Greek or light yogurt (less than 10 grams sugar)    Fat free or low-fat cottage cheese    String cheese or reduced fat cheese slices    Tuna, salmon, crab, egg, or chicken salad made with light or fat free mayonnaise    Egg or Egg Substitute    Lean/extra lean turkey, beef, bison, venison (ground, sirloin, round, flank)    Pork loin or tenderloin (grilled, baked, broiled)    Fish such as salmon, tuna, trout, tilapia, etc. (grilled, baked, broiled)    Tender cuts of lean (skinless) turkey or chicken    Lean deli meats: turkey, lean ham, chicken, lean roast beef    Beans such as kidney, garbanzo, black, adame, or low-fat/fat free refried beans    Peanut butter (natural preferred). Limit to 1 Tbsp. per day.    Low-fat meatloaf (made with lean ground beef or turkey)    Sloppy Joes made with low-sugar ketchup and lean ground beef or turkey    Soy or vegetable  protein (i.e. vegan crumbles, soy/veggie burger, tofu)    Hummus    Vegetables:    Fresh: cooked or raw (as tolerated)    Frozen vegetables    Canned vegetables (low sodium or no salt added, rinse before cooking/eating)    (Ok to have skins/peels/membranes/seeds - just chew well)    Fruits:    Fresh fruit    Frozen fruit (no sugar added)    Canned fruit (packed in its own juice, NOT syrup)    (Ok to have skins/peels/membranes/seeds - just chew well)    Starch:    Unsweetened whole-grain hot cereal (or high fiber cold cereal, dry)    Toasted whole wheat bread or [place] Thins    Whole grain crackers    Baked /boiled/mashed potato/sweet potato    Cooked whole grain pasta, brown rice, or other cooked whole grains    Starchy vegetables: corn, peas, winter squash    Protein Supplement:     Ready to drink protein shake with:  o 15-30 grams protein per serving  o Less than 10 grams total carbohydrate per serving     Protein powder mixed with:  o  Skim or 1% milk  o Low fat or fat free Lactaid milk, plain or no sugar added soymilk  o Water     Fats: (use in moderation)    1 teaspoon of soft tub margarine    1 teaspoon olive oil, canola oil, or peanut oil    1 tablespoon of low-fat srivastava or salad dressing     Sample Menu for 18+ months after Gastric Bypass    You do NOT need to eat/drink the full portion sizes listed below  Always stop when you are satisfied    Breakfast   cup 1% cottage cheese     cup mixed berries   Lunch 2 oz lean roast beef on   [place] Thin with 1 tsp. light srivastava    small tomato, chopped, mixed with 1 tsp. light vinaigrette dressing   Supplement Approved protein supplement (if needed between meals)   Dinner 2 oz grilled salmon    cup salad greens with 1 tsp. light salad dressing and 1 tsp. ground flax seed    cup quinoa or brown rice     Breakfast   cup egg substitute with   cup sautéed chopped vegetables  2 light [place] Krisp crackers   Lunch Tuna Melt:   cup tuna mixed with 1 tsp. light srivastava over    [place] Thin. Top with 2-3 slices cucumber and 1 oz slice of low fat cheese   Supplement 1 cup skim milk (if needed between meals)   Dinner 3 oz  grilled, broiled, or baked seasoned skinless chicken breast    cup asparagus     Breakfast   cup plain oatmeal made with skim or 1% milk with 1 Tbsp. flavored/unflavored protein powder added  1 mozzarella string cheese   Lunch 2 oz deli turkey breast  1/3 cup salad with 1 tsp. light salad dressing, 1/8 of a whole avocado and 1 Tbsp. sunflower seeds   Dinner 3 oz. pork loin made in a crock pot, seasoned with a spice rub    cup cooked carrots   Supplement Approved protein supplement (if needed between meals)     Breakfast 1 cup breakfast casserole made with egg substitute, turkey sausage,  and steamed, chopped bell peppers   Supplement  1 cup light Greek yogurt (if needed between meals)   Lunch 2 oz. teriyaki turkey    cup mashed sweet potato with 1-2 spritzes of spray butter (like [place])    cup fresh pineapple   Dinner 3 oz low fat meatloaf    cup roasted garlic zucchini     Breakfast   cup leftover breakfast casserole    cup no sugar added applesauce with 1 Tbsp. unflavored protein powder and a sprinkle of cinnamon    Lunch 3 oz shrimp with 1-2 Tbsp. low-sugar cocktail sauce for dipping    c. whole wheat pasta drizzled with   tsp. olive oil   Supplement 1 cup skim/1% milk with scoop of protein powder (if needed between meals)   Dinner Grilled, seasoned kebob with 2 oz lean beef and   cup vegetables     Breakfast Breakfast pizza:   [place] Thin spread with 1 Tbsp. low sugar spaghetti sauce,   cup shredded low fat cheese, melted and 1 slice of [place] culp     cup fresh fruit mixed with chopped almonds   Lunch   cup black bean soup  4-5 whole grain crackers   Dinner 3 oz  tilapia with lemon pepper seasoning    cup stewed tomatoes   Supplement 1 string cheese (if needed between meals)     Breakfast 2 hard boiled eggs (discard 1 egg yolk)    whole wheat English Muffin  with 1 tsp. low sugar jelly   Lunch   cup leftover black bean soup topped with 1-2 Tbsp. low fat cheese  2-3 light [place] Krisp crackers   Supplement Approved protein supplement (if needed between meals)   Dinner 3 oz sirloin steak    cup steamed broccoli

## 2021-05-30 NOTE — PROGRESS NOTES
Bariatric Care Clinic Follow Up Visit for Previous Bariatric Surgery   Date of visit: 7/19/2019  Physician: Home Chase MD  Primary Care is Gillian Garzon MD.  Suzy Rodríguez   33 y.o.  female    Date of Surgery: 6/8/16  Initial Weight: 230 lbs  Initial BMI: 39.8  Today's Weight:   Wt Readings from Last 1 Encounters:   07/19/19 144 lb (65.3 kg)     Body mass index is 24.72 kg/m .  Initial Weight: 230 lbs  Weight: 144 lb (65.3 kg)  Weight loss from initial: 86  % Weight loss: 37.39 %       Assessment and Plan   Assessment: Suzy is a 33 y.o. year old female who is 3 years s/p  Tanika en Y Gastric Bypass with Dr. Sutherland.  She has had a durable weight loss of 86 lbs since surgery.  Overall compliance with the [facility]ariatric Surgery Program has been poor lately w/ lack of vitamin supplementation and has switched to plant based diet but may not be getting enough protein (PB/dairy use is still on and will take protein drink 3-4 days weekly.  She was found to have a adrenocortical carcinoma and is s/p resection and receiving mitotane/hydrocortisone and followed by [facility].  THC use for nausea/sx can affect long term satiety.  .  Suzy Rodríguez feels that she has achieved her   preoperative goals for bariatric surgery.    Plan:   1. Welcome back, we'll check the remainder of your vitamin levels today and refer you to our dietician to discuss more plant based protein/baritric eating styles.    2. Your calcium levels were a bit low yesterday, start your calcium citrate again twice daily. Restart B12, vitamin and D3 at 5000IUs.  Recent low calcium on labs from [facility], some of which could be a bit low due to low protein intake as well but given some increased muscle irritability/twitching calcium supplementation should restart immediately.     3. Your thyroid medication should be adjusted by your Endocrine clinic, you look to be over supplemented and improving levels back into the normal  range should improve some of the tremor/anxiety and sleep disturbances you've experienced lately.     4. Consider meditative breathing and Lukasz Chi for low impact stress relieving options. YouTube has several good video.    5. Continues to follow w/ oncology for adrenal cancer. Improved bariatric method/nutrition and supplementation should optimize recovery/future health.  Some of her adjustment disorder to her cancer has affected her self care and supplementation and we discussed giving her body the tools it needs to have resilience today.  I anticipate numerous deficiencies but it does appear her menorrhagia induced anemia issues last winter have corrected w/ IUD placement but given lack of vitamin use, iron deficiency anemia will always be a risk should supplementation falter. We'd recommend at least 36-60mg daily of iron supplementation from her multivitamin sources and following levels.  We discussed PatchMD products today given some of her oral med fatigue and the Multi-Plus patch is attractive to her as an alternative.       1. Postoperative malabsorption  Vitamin D, Total (25-Hydroxy)    Parathyroid Hormone Intact    Vitamin A (Retinol), Serum or Plasma    Vitamin B12    Vitamin B1 (Thiamine), Whole Blood (VIT B1 WB)    Zinc, Serum or Plasma    Folate, Serum   2. S/P gastric bypass     3. Malignant neoplasm of cortex of right adrenal gland (H)      followed by oncology. proper nutrition/supplementation should optimize health/immune function.   4. Hyperthyroidism      over supplemented.   5. 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       No follow-ups on file.     Bariatric Surgery Review   Interim History/LifeChanges: develop adrenocortical carcinoma. Quit working. Sleep most     Patient Concerns: wanting guidance on nutrition/plant based diet and seeing dietician again.    Medication changes: see list. On THC,  antinausea/mitotane/cortisone.    Appetite (1-10): na    GERD sx at all? None.    Vitamin Intake:   Multivitamin   no   Vitamin D  no   Calcium  no   Vit. B-12    no     Habits:            Alcohol Intake  no   NSAID Use  avoids   Caffeine Use  monster drinks. Zero calorie.   Exercise  limited   CPAP Use:  na   Birth Control  IUD this winter after heavier periods started                                            LABS: ordered      LABS:  Lab Results   Component Value Date    WBC 7.4 12/20/2018    HGB 9.3 (L) 12/20/2018    HCT 30.1 (L) 12/20/2018    MCV 68 (L) 12/20/2018     12/20/2018      Lab Results   Component Value Date    XGKFGMFO97JE 31.7 07/11/2017    Lab Results   Component Value Date    HGBA1C 5.3 10/29/2015      Lab Results   Component Value Date    CHOL 152 07/11/2017    Lab Results   Component Value Date    PTH 36 07/11/2017         Lab Results   Component Value Date    FERRITIN 9 (L) 12/20/2018      Lab Results   Component Value Date    HDL 54 07/11/2017      Lab Results   Component Value Date    LNUYDGFX49 668 03/13/2018    Lab Results   Component Value Date    51977 192 (H) 07/11/2017      Lab Results   Component Value Date    LDLCALC 76 07/11/2017    Lab Results   Component Value Date    TSH 1.73 10/08/2015    Lab Results   Component Value Date    FOLATE >20.0 07/11/2017      Lab Results   Component Value Date    TRIG 112 07/11/2017    Lab Results   Component Value Date    ALT 29 06/01/2018    AST 22 06/01/2018    ALKPHOS 123 (H) 06/01/2018    BILITOT 0.5 06/01/2018    No results found for: TESTOSTERONE     No components found for: CHOLHDL Lab Results   Component Value Date    7597 86.5 (H) 07/11/2017      @resfast(vitamin a: 1)@          Patient Profile   Social History     Social History Narrative     Not on file        Past Medical History   Past Medical History:   Diagnosis Date     Anemia     thought due to heavy menses.     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     SHREYA on CPAP 12/17/2015    Stopped using CPAP the Fall of 2016 after weight loss as even at lower pressure/adjustment couldn't tolerate the cpap. Resting well, cautioned to watch for any signs of fatigue and consider \"titration study\" in the future to see if cpap is still required at her new weight.     Pneumonia     hx of recurrent pneumonia issues/respiratory infections.     Polycystic ovary syndrome     able to get pregnant, not on meds.     Pulmonary embolism (H) 2009 or so    briefly on wafarin.     Sleep apnea     cpap     Vitamin D deficiency      Patient Active Problem List   Diagnosis     Adipose Tissue Fibrolipoma     Generalized anxiety disorder     Urticaria Allergic Due To Envenomation     Major Depression, Recurrent     MTHFR mutation (H)     S/P gastric bypass     Hypervitaminosis A     Calculus of ureter     Hydronephrosis with urinary obstruction due to ureteral calculus     Calculus of kidney     Cushing's syndrome (H)     Carcinoma, adrenal cortical (H)     Malignant neoplasm of cortex of right adrenal gland (H)     Other malignant neuroendocrine tumors (H)     Neoplastic malignant related fatigue     Adrenal insufficiency (H)     Luetscher's syndrome     Nausea and vomiting     Herpes simplex infection of genitourinary system     Encounter for insertion of intrauterine contraceptive device (IUD)     Current Outpatient Medications   Medication Sig Note     acetaminophen (TYLENOL) 325 MG tablet Take 650 mg by mouth.      buPROPion (WELLBUTRIN) 100 MG tablet Take 200 mg in AM and 100 mg in PM      EPINEPHrine (EPIPEN) 0.3 mg/0.3 mL (1:1,000) atIn As directed      fludrocortisone (FLORINEF) 0.1 mg tablet Take 0.05 mg by " mouth.      FLUoxetine (PROZAC) 40 MG capsule Take 1 capsule (40 mg total) by mouth daily.      gabapentin (NEURONTIN) 300 MG capsule Take 1 capsule (300 mg total) by mouth 3 (three) times a day.      hydrocortisone (CORTEF) 10 MG tablet Take 30 mg by mouth every morning.  7/6/2018: Received from: Mary Received Sig: Take 1 tablet (10 mg) by mouth daily At 2pm until follow up with Endocrinology (total: 30mg daily)     hydrocortisone (CORTEF) 20 MG tablet Take 20 mg by mouth daily. In the afternoon 7/6/2018: Received from: Mary Received Sig: Take 1 tablet (20 mg) by mouth every morning (then 10mg at 2pm) until followup with Endocrinology     levothyroxine (SYNTHROID, LEVOTHROID) 125 MCG tablet Take 125 mcg by mouth.      medical cannabis (Patient's own supply. Not a prescription) by Other route see administration instructions (This is NOT a prescription, and does not certify that the patient has a qualifying medical condition for medical cannabis.  The purpose of this order is  to document that the patient reports taking medical cannabis.) . 7/19/2019: Managed by her oncologist.     mitotane (LYSODREN) 500 mg tablet Take 1,500 mg by mouth 3 (three) times a day.             prochlorperazine (COMPAZINE) 10 MG tablet Take 10 mg by mouth.      traZODone (DESYREL) 50 MG tablet Take 100 mg by mouth.      amino acids (DAILY AMINO ORAL) Take 325 mg by mouth daily. 6/15/2018: Received from: Mary Received Sig: Take 325 mg by mouth daily (with breakfast)     calcium carbonate (CALCIUM ANTACID) 400 mg calcium (1,000 mg) Chew Chew. Take 1 tablet once a day      cholecalciferol, vitamin D3, 1,000 unit tablet Take 2,000 Units by mouth daily.      LORazepam (ATIVAN) 0.5 MG tablet Take 1-2 tablets (0.5-1 mg total) by mouth every 8 (eight) hours as needed for anxiety.      MULTIVIT,CALC,MINS/IRON/FOLIC (ONE-A-DAY WOMENS FORMULA ORAL) Take by mouth. Take 1 tablet once a day      ondansetron (ZOFRAN) 4 MG tablet Take 1 tablet  "(4 mg total) by mouth every 6 (six) hours as needed for nausea.        Past Surgical History  She has a past surgical history that includes  section, classic; Tubal ligation; [place] tooth extraction (Bilateral); pr lap gastric bypass/elly-en-y (N/A, 2016); pr cysto/uretero w/lithotripsy &indwell stent insrt (Left, 2018); and Adrenalectomy (Right).     Examination   /72 (Patient Site: Right Arm, Patient Position: Sitting, Cuff Size: Adult Small)   Pulse 85   Ht 5' 4\" (1.626 m)   Wt 144 lb (65.3 kg)   SpO2 99%   BMI 24.72 kg/m    Height: 5' 4\" (1.626 m) (2019  9:39 AM)  Initial Weight: 230 lbs (2019  9:39 AM)  Weight: 144 lb (65.3 kg) (2019  9:39 AM)  Weight loss from initial: 86 (2019  9:39 AM)  % Weight loss: 37.39 % (2019  9:39 AM)  BMI (Calculated): 24.7 (2019  9:39 AM)  SpO2: 99 % (2019  9:39 AM)    General:  Alert and ambulatory,   HEENT:  No conjunctival pallor, moist mucous Membranes, neck is thin.  Pulmonary:  Normal respiratory effort, no cough, no audible wheezes/crackles.  CV:  Regular rate and Rhythm, no murmurs, pulses full  Abdominal: flat, soft, well healed midline scar without palpable hernia.  Extremities: tremor of hands, restlessness of legs.  Skin:  Fair. Sleep shiners.  Pscyh/Mood: appears sedated mildly. Depressed affect compared to last visit.  Getting help w/ therapist. Discussed meditative relaxation/stress coping and gentle, movement based therapies such as yoga/jay chi as well.          Counseling:   We reviewed the important post op bariatric recommendations:  -eating 3 meals daily  -eating protein first, getting >60gm protein daily  -eating slowly, chewing food well  -avoiding/limiting calorie containing beverages  -drinking water 15-30 minutes before or after meals  -limiting restaurant or cafeteria eating to twice a week or less    We discussed the importance of restorative sleep and stress management in maintaining a " healthy weight.  We discussed the National Weight Control Registry healthy weight maintenance strategies and ways to optimize metabolism.  We discussed the importance of physical activity including cardiovascular and strength training in maintaining a healthier weight.  We discussed the importance of life-long vitamin supplementation and life-long  follow-up.    Suzy was reminded that, to avoid marginal ulcers she should avoid tobacco at all, alcohol in excess, caffeine in excess, and NSAIDS (unless indicated for cardioprotection or othewise and opposed by a PPI).    At least 25 minutes was spent in direct consultation and over 50% of the time devoted to counseling regarding maximizing the benefits of her previous bariatric surgery while minimizing risks of nutritional or structural complications.    Home Chase MD  [facility].  7/19/2019  9:40 AM

## 2021-05-30 NOTE — PROGRESS NOTES
"Post-op Surgical Weight Loss Diet Evaluation     Assessment:  Pt presents for 3 years post-op RD visit, s/p RYGB with Dr. Sutherland. Today we reviewed current eating habits and level of physical activity, and instructed on the changes that are required for successful bariatric outcomes.    Patient Progress: Patient reports that she is throwing up a lot. Would like to switch to a plant based diet    Pt's Initial Weight: 230 lbs  Weight: 144 lb (65.3 kg)  Weight loss from initial: 86  % Weight loss: 37.39 %  BMI: 24.72     Concerns: Frequent vomiting, inadequate protein intake, likely malnutrition, not taking bariatric vitamins     Vitamins   Multi Vit with Iron: no  Calcium Citrate: no  B12: no  D3: no    Do you experience hunger? occasionally  Do you have \"dumping\" syndrome? It's hard to tell could be from chemo    How often? A few times per day   With what foods: pasta  Do you experience any reflux or discomfort with eating? none  Nausea: yes  Vomiting: yes  Diarrhea: yes  Constipation: no  Hair loss:no    Diet Recall/Time:   Breakfast: oatmeal- 100kcal packet  Am Snack: almonds or cheese  Lunch: slept through   Pm snack:almonds  Dinner: sub from [employer]- ordered kids sandwich and couldn't finish it   HS Snack: ice-ee    Proteins/Veg/Fruits/CHO (NOT well tolerated): pasta or seafood    Incorporation of vegetables, fruits, carbohydrates into diet/meals using   Bariatric \"Plate Method\"   The patient and I discussed incorporating a plant based diet into her lifestyle, discussing plant based sources of protein as well as how to build a healthy meal. I also encouraged incorporating a plant based protein supplement into her routine in order to get adequate protein.    Fluid Intake  Water: 30-40oz  Alcohol: none  Carbonation: 1 monster energy drink, 1 diet soda per day  Milk: none  Juice: none    Discussed the importance of adequate hydration after surgery and the goal of 64+ oz of fluid daily.   The patient understands the " "importance of avoiding all carbonated, caffeinated, and sweetened drinks; and instead choosing 64oz plain water.    Exercise  Type: none- low energy    Pt's understands that 30-60 minutes of daily activity is an important part of bariatric surgery success.   Encouraged pt to incorporate strength training exercise along with cardiovascular exercise as well, most days of the week.      PES statement:    1. (NI-5.7.1) Inadequate protein intake related to Gastric bypass causing increased nutrient needs due to malabsorption/ Decreased ability to consume sufficient protein as evidenced by Edema, poor musculature, dull skin, thin and fragile hair; and Estimated intake of protein insufficient to meet requirements     Intervention    Discussion  1. Recommended to consume 15-20gm protein at 3 meals daily, along with protein supplement/\"planned protein containing snack\" of 15-30gm protein, to reach goal of 60-80 gm protein daily.  2. Educated on post-op vitamin regimen: Multi Vit + iron 2x/day, calcium citrate 400-600 mg 2x/day, 0999-0053 mcg of Sublingual B-12 daily, and 5000 IU Vitamin D3 daily (MVI and calcium can be taken at the same time BID)  3. Reviewed lean plant based protein sources  4. Bariatric Plate Method  Instructions  1. Include 15-20gm protein at each meal, along with protein supplement/\"planned protein containing snack\" of 15-30gm protein, to reach goal of 60-80 gm protein daily.  2. Increase fluid intake to 64oz daily: choose plain or calorie/carbonation-free beverages.  3. Incorporate daily structured activity, 30-60 minutes most days of the week  4. Recommended pt to start taking: Multi Vit + iron 2x/day, calcium citrate 400-600 mg 2x/day, 1493-3633 mcg of Sublingual B-12 daily, and 5000 IU Vitamin D3 daily. (MVI and calcium can be taken at the same time)  5. Read food labels more consistently: keeping total fat grams <10, total sugar grams <10, fiber >3gm per serving.  6. Increase vegetable/fruit intake, " by having a vegetable or fruit with each meal daily.  7. Practice plate method: 1/2 plate lean/low fat protein source, vegetable/fruit, <25% of plate complex carbohydrates.  8. Separate fluids 30 minutes before/after meal times.  9. Practice eating off of smaller plates/bowls, chewing to applesauce consistency, taking 20-30 minutes to eat in a calm/relaxed environment without distractions of tv/email/cell phone.    Handouts provided:  Bariatric Plate  Lean Protein sources  Plant Based Protein Supplements    Monitor/Evaluation    Pt to follow up with RD in 3 months and bariatrician in 6 months      Time In: 10:15a  Time Out: 10:35a      ABN signed: Yes

## 2021-05-30 NOTE — PATIENT INSTRUCTIONS - HE
Thanks for coming to the clinic today. It was nice to see you again.    I think your symptoms are most likely related to muscle spasm from tubing. I recommend heating pads and/or warm baths to relax muscles, scheduled tylenol, and flexeril muscle relaxer as needed. Okay to take 5-10 mg up to three times daily as needed.     We will check urine today looking for blood for possible kidney stone. If this test is normal, it doesn't rule out kidney stones.     If symptoms are not improving, let me know and we can talk about next steps.

## 2021-05-30 NOTE — PROGRESS NOTES
[facility] Note    Name: Suzy Rodríguez  : 1985   MRN: [id number]    Suzy Rodríguez is a 33 y.o. female presenting to discuss the following:     CC:   Chief Complaint   Patient presents with     Back Pain     Low back pain       HPI:  Low back pain started after tubing. Initially on right side, then transferred to left side. Comes in waves. Had initially resolved when saw oncology, but has recurred. Having difficulty sleeping. No blood in urine or urinary symptoms. Sometimes feels like needs to go to the bathroom but can't. History of kidney stones.     For pain, using medical cannabis. Using tylenol once per day. No muscle relaxers.     ROS:   See HPI above.     PMH:   Patient Active Problem List   Diagnosis     Adipose Tissue Fibrolipoma     Generalized anxiety disorder     Urticaria Allergic Due To Envenomation     Major Depression, Recurrent     MTHFR mutation (H)     S/P gastric bypass     Hypervitaminosis A     Calculus of ureter     Hydronephrosis with urinary obstruction due to ureteral calculus     Calculus of kidney     Cushing's syndrome (H)     Carcinoma, adrenal cortical (H)     Malignant neoplasm of cortex of right adrenal gland (H)     Other malignant neuroendocrine tumors (H)     Neoplastic malignant related fatigue     Adrenal insufficiency (H)     Luetscher's syndrome     Nausea and vomiting     Herpes simplex infection of genitourinary system     Encounter for insertion of intrauterine contraceptive device (IUD)       Past Medical History:   Diagnosis Date     Anemia     thought due to heavy menses.     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     SHREYA on CPAP 2015    Stopped  "using CPAP the  of  after weight loss as even at lower pressure/adjustment couldn't tolerate the cpap. Resting well, cautioned to watch for any signs of fatigue and consider \"titration study\" in the future to see if cpap is still required at her new weight.     Pneumonia     hx of recurrent pneumonia issues/respiratory infections.     Polycystic ovary syndrome     able to get pregnant, not on meds.     Pulmonary embolism (H)  or so    briefly on wafarin.     Sleep apnea     cpap     Vitamin D deficiency        PSH:   Past Surgical History:   Procedure Laterality Date     ADRENALECTOMY Right       SECTION, CLASSIC      x2     [place] CYSTO/URETERO W/LITHOTRIPSY &INDWELL STENT INSRT Left 2018    Procedure: CYSTOSCOPY, LEFT URETEROSCOPY LASER LITHOTRIPSY STENT INSERTION;  Surgeon: Skyler Delvalle MD;  Location: [facility];  Service: Urology     [place] LAP GASTRIC BYPASS/DERICK-EN-Y N/A 2016    Procedure: GASTRIC BYPASS LAPAROSCOPIC DERICK-EN-Y;  Surgeon: Randy Sutherland MD;  Location: [facility];  Service: General     TUBAL LIGATION       WISDOM TOOTH EXTRACTION Bilateral      MEDICATIONS:   Current Outpatient Medications on File Prior to Visit   Medication Sig Dispense Refill     acetaminophen (TYLENOL) 325 MG tablet Take 650 mg by mouth.       amino acids (DAILY AMINO ORAL) Take 325 mg by mouth daily.       buPROPion (WELLBUTRIN) 100 MG tablet Take 200 mg in AM and 100 mg in  tablet 3     calcium carbonate (CALCIUM ANTACID) 400 mg calcium (1,000 mg) Chew Chew. Take 1 tablet once a day       cholecalciferol, vitamin D3, 1,000 unit tablet Take 2,000 Units by mouth daily.       EPINEPHrine (EPIPEN) 0.3 mg/0.3 mL (1:1,000) atIn As directed       fludrocortisone (FLORINEF) 0.1 mg tablet Take 0.05 mg by mouth.       FLUoxetine (PROZAC) 40 MG capsule Take 1 capsule (40 mg total) by mouth daily. 90 capsule 2     gabapentin (NEURONTIN) 300 MG capsule Take 1 capsule (300 mg total) by " "mouth 3 (three) times a day. 270 capsule 3     hydrocortisone (CORTEF) 10 MG tablet Take 30 mg by mouth every morning.        hydrocortisone (CORTEF) 20 MG tablet Take 20 mg by mouth daily. In the afternoon       levothyroxine (SYNTHROID, LEVOTHROID) 125 MCG tablet Take 125 mcg by mouth.       medical cannabis (Patient's own supply. Not a prescription) by Other route see administration instructions (This is NOT a prescription, and does not certify that the patient has a qualifying medical condition for medical cannabis.  The purpose of this order is  to document that the patient reports taking medical cannabis.) .       mitotane (LYSODREN) 500 mg tablet Take 1,500 mg by mouth 3 (three) times a day.              MULTIVIT,CALC,MINS/IRON/FOLIC (ONE-A-DAY WOMENS FORMULA ORAL) Take by mouth. Take 1 tablet once a day       ondansetron (ZOFRAN) 4 MG tablet Take 1 tablet (4 mg total) by mouth every 6 (six) hours as needed for nausea. 30 tablet 1     prochlorperazine (COMPAZINE) 10 MG tablet Take 10 mg by mouth.       traZODone (DESYREL) 50 MG tablet Take 100 mg by mouth.       LORazepam (ATIVAN) 0.5 MG tablet Take 1-2 tablets (0.5-1 mg total) by mouth every 8 (eight) hours as needed for anxiety. 60 tablet 0     No current facility-administered medications on file prior to visit.        ALLERGIES:  Allergies   Allergen Reactions     Ibuprofen Hives, Swelling and Other (See Comments)     Venom-Honey Bee Swelling       PHYSICAL EXAM:   /78   Pulse 92   Temp 98.3  F (36.8  C) (Oral)   Resp 12   Ht 5' 4\" (1.626 m)   Wt 147 lb 3 oz (66.8 kg)   BMI 25.26 kg/m     GENERAL: Suzy is a pleasant, nontoxic-appearing female in no acute distress.  HEENT: Sclera white, no nasal discharge.  HEART: Regular rate and rhythm, no murmurs.  LUNGS: Clear to auscultation bilaterally, unlabored.  ABDOMEN: Abdomen is soft, nontender to palpation.  No palpable masses.  : No costovertebral angle tenderness.  MSK: Posterior spinous " processes nontender to palpation.  Mild tenderness with palpation to right paraspinal lumbar musculature with some tissue texture changes more firm to palpation.    ASSESSMENT & PLAN:   Suzy Rodríguez is a 33 y.o. female presenting today for evaluation of low back pain.    1. Flank pain  2. History of kidney stones  3. Back muscle spasm  - Urinalysis-UC if Indicated  - cyclobenzaprine (FLEXERIL) 5 MG tablet; Take 1-2 tablets (5-10 mg total) by mouth 3 (three) times a day as needed for muscle spasms.  Dispense: 30 tablet; Refill: 1    Symptoms are most consistent with a musculoskeletal etiology given onset after tubing.  Unable to take ibuprofen products.  Recommended scheduling Tylenol, applying warm packs, sitting in a warm bath, and adding muscle relaxer as needed.  Counseled that muscle relaxers can be sedating.  She should not take prior to driving.  If too sedating, cut back to half a tablet.    Given waxing and waning symptoms, also wonder given history of kidney stones at this possibly reflects recurrent kidney stone.  Recommended UA today to evaluate for microscopic hematuria.  Discussed that even if UA was negative, this would not rule out kidney stones as etiology.  Something to consider further, if symptoms do not improve with recommendations above.  Additionally could trial physical therapy.    RTC: 2 weeks - if symptoms are not improving.     Jayna Gomez, DO

## 2021-05-31 VITALS — WEIGHT: 154.6 LBS | BODY MASS INDEX: 26.4 KG/M2 | HEIGHT: 64 IN

## 2021-05-31 VITALS — BODY MASS INDEX: 24.84 KG/M2 | WEIGHT: 145.5 LBS | HEIGHT: 64 IN

## 2021-05-31 NOTE — TELEPHONE ENCOUNTER
Called to discuss her questions on CT findings of some possible intussusception. Currently doing well. I've discussed the case with Dr. Sutherland today as well and discussed that in many cases surgical resection isn't wise for just CT evidence but can be necessary if symptomology develops (pain/vomting/bleeding issues) and she should seek ER evaluation and bariatric surgery consultation in such cases. Given lack of symptoms currently operative correction would not be in her benefit.  Can follow up as needed in the future with Dr. Sutherland if more concerning features develop or notify our clinic.  She feels comfortable with this plan.    Home Chase MD  [facility]

## 2021-05-31 NOTE — TELEPHONE ENCOUNTER
Refill Approved    Rx renewed per Medication Renewal Policy. Medication was last renewed on 2/20/19.    Suma Rolon, [facility] Connection Triage/Med Refill 8/10/2019     Requested Prescriptions   Pending Prescriptions Disp Refills     buPROPion (WELLBUTRIN) 100 MG tablet [Pharmacy Med Name: BUPROPION HCL 100MG TABS] 180 tablet 3     Sig: TAKE ONE TABLET BY MOUTH TWICE A DAY       Tricyclics/Misc Antidepressant/Antianxiety Meds Refill Protocol Passed - 8/9/2019  3:59 PM        Passed - PCP or prescribing provider visit in last year     Last office visit with prescriber/PCP: 2/20/2019 Gillian Garzon MD OR same dept: 2/20/2019 Gillian Garzon MD OR same specialty: 7/25/2019 Jayna Gomez DO  Last physical: 3/24/2017 Last MTM visit: Visit date not found   Next visit within 3 mo: Visit date not found  Next physical within 3 mo: Visit date not found  Prescriber OR PCP: Gillian Garzon MD  Last diagnosis associated with med order: 1. Generalized anxiety disorder  - buPROPion (WELLBUTRIN) 100 MG tablet [Pharmacy Med Name: BUPROPION HCL 100MG TABS]; TAKE ONE TABLET BY MOUTH TWICE A DAY  Dispense: 180 tablet; Refill: 3    If protocol passes may refill for 12 months if within 3 months of last provider visit (or a total of 15 months).

## 2021-06-01 ENCOUNTER — VIRTUAL VISIT (OUTPATIENT)
Dept: FAMILY MEDICINE | Facility: CLINIC | Age: 36
End: 2021-06-01
Payer: COMMERCIAL

## 2021-06-01 VITALS — HEIGHT: 64 IN | WEIGHT: 155.5 LBS | BODY MASS INDEX: 26.55 KG/M2

## 2021-06-01 VITALS — WEIGHT: 158.56 LBS | BODY MASS INDEX: 27.07 KG/M2 | HEIGHT: 64 IN

## 2021-06-01 VITALS — HEIGHT: 64 IN | BODY MASS INDEX: 26.87 KG/M2 | WEIGHT: 157.4 LBS

## 2021-06-01 VITALS — WEIGHT: 157 LBS | BODY MASS INDEX: 26.8 KG/M2 | HEIGHT: 64 IN

## 2021-06-01 VITALS — WEIGHT: 159.06 LBS | BODY MASS INDEX: 27.3 KG/M2

## 2021-06-01 VITALS — WEIGHT: 156.6 LBS | HEIGHT: 64 IN | BODY MASS INDEX: 26.73 KG/M2

## 2021-06-01 VITALS — WEIGHT: 158.6 LBS | HEIGHT: 64 IN | BODY MASS INDEX: 27.08 KG/M2

## 2021-06-01 VITALS — HEIGHT: 64 IN | WEIGHT: 157.1 LBS | BODY MASS INDEX: 26.82 KG/M2

## 2021-06-01 VITALS — WEIGHT: 159.06 LBS | BODY MASS INDEX: 27.16 KG/M2 | HEIGHT: 64 IN

## 2021-06-01 VITALS — HEIGHT: 64 IN | BODY MASS INDEX: 26.96 KG/M2 | WEIGHT: 157.9 LBS

## 2021-06-01 DIAGNOSIS — F33.1 MODERATE EPISODE OF RECURRENT MAJOR DEPRESSIVE DISORDER (H): Primary | ICD-10-CM

## 2021-06-01 DIAGNOSIS — R41.840 ATTENTION AND CONCENTRATION DEFICIT: ICD-10-CM

## 2021-06-01 PROCEDURE — 99213 OFFICE O/P EST LOW 20 MIN: CPT | Mod: 95 | Performed by: FAMILY MEDICINE

## 2021-06-01 RX ORDER — BUPROPION HYDROCHLORIDE 150 MG/1
150 TABLET, EXTENDED RELEASE ORAL
COMMUNITY
Start: 2021-05-04 | End: 2021-11-11

## 2021-06-01 NOTE — PROGRESS NOTES
UA with trace leukocytes, microscopic hematuria with 3-5 RBC. Treating for UTI.   Patient updated on results by [facility] message.   Jayna Gomez DO

## 2021-06-01 NOTE — PROGRESS NOTES
[facility] Note    Name: Suzy Rodríguez  : 1985   MRN: [id number]    Suzy Rodríguez is a 33 y.o. female presenting to discuss the following:     CC:   Chief Complaint   Patient presents with     Urinary Tract Infection       HPI:  URINARY SYMPTOMS:   Suzy presents today with concerns for UTI. Symptoms consistent with previous UTIs, frequency and dysuria. Denies hematuria. She has chronic pain. No fevers.     SLEEP:   States she has difficulty sleeping, requests refill of trazodone. Wondering if she can have something else for sleep as she is waking up in the middle of the night.     NAUSEA:   Tried Zofran and Compazine, not helping.     DIARRHEA:   Using lomotil three times a day. Wondering if can increase dosage.     ROS:   See HPI above.     PMH:   Patient Active Problem List   Diagnosis     Adipose Tissue Fibrolipoma     Generalized anxiety disorder     Urticaria Allergic Due To Envenomation     Major Depression, Recurrent     MTHFR mutation (H)     S/P gastric bypass     Hypervitaminosis A     Calculus of ureter     Hydronephrosis with urinary obstruction due to ureteral calculus     Calculus of kidney     Cushing's syndrome (H)     Carcinoma, adrenal cortical (H)     Malignant neoplasm of cortex of right adrenal gland (H)     Other malignant neuroendocrine tumors (H)     Neoplastic malignant related fatigue     Adrenal insufficiency (H)     Luetscher's syndrome     Nausea and vomiting     Herpes simplex infection of genitourinary system     Encounter for insertion of intrauterine contraceptive device (IUD)       Past Medical History:   Diagnosis Date     Anemia     thought due to heavy menses.     Carcinoma, adrenal cortical (H) 2018     Clotting disorder (H)      Depression      GERD (gastroesophageal reflux disease)      Hypertension due to endocrine disorder 2018     Kidney stones     passed on their own     Menstrual disorder     menorrhagia     MTHFR mutation (H)   "   believed to be homozygous for the mutation.     SHREYA on CPAP 2015    Stopped using CPAP the  after weight loss as even at lower pressure/adjustment couldn't tolerate the cpap. Resting well, cautioned to watch for any signs of fatigue and consider \"titration study\" in the future to see if cpap is still required at her new weight.     Pneumonia     hx of recurrent pneumonia issues/respiratory infections.     Polycystic ovary syndrome     able to get pregnant, not on meds.     Pulmonary embolism (H)  or so    briefly on wafarin.     Sleep apnea     cpap     Vitamin D deficiency        PSH:   Past Surgical History:   Procedure Laterality Date     ADRENALECTOMY Right       SECTION, CLASSIC      x2     [place] CYSTO/URETERO W/LITHOTRIPSY &INDWELL STENT INSRT Left 2018    Procedure: CYSTOSCOPY, LEFT URETEROSCOPY LASER LITHOTRIPSY STENT INSERTION;  Surgeon: Skyler Delvalle MD;  Location: [facility];  Service: Urology     [place] LAP GASTRIC BYPASS/DERICK-EN-Y N/A 2016    Procedure: GASTRIC BYPASS LAPAROSCOPIC DERICK-EN-Y;  Surgeon: Randy Sutherland MD;  Location: [facility];  Service: General     TUBAL LIGATION       WISDOM TOOTH EXTRACTION Bilateral          MEDICATIONS:   Current Outpatient Medications on File Prior to Visit   Medication Sig Dispense Refill     acetaminophen (TYLENOL) 325 MG tablet Take 650 mg by mouth.       amino acids (DAILY AMINO ORAL) Take 325 mg by mouth daily.       buPROPion (WELLBUTRIN) 100 MG tablet Take 200 mg in AM and 100 mg in  tablet 3     buPROPion (WELLBUTRIN) 100 MG tablet Take 1 tablet (100 mg total) by mouth 2 (two) times a day. 180 tablet 3     calcium carbonate (CALCIUM ANTACID) 400 mg calcium (1,000 mg) Chew Chew. Take 1 tablet once a day       cholecalciferol, vitamin D3, 1,000 unit tablet Take 2,000 Units by mouth daily.       cyclobenzaprine (FLEXERIL) 5 MG tablet Take 1-2 tablets (5-10 mg total) by mouth 3 (three) times a day " as needed for muscle spasms. 30 tablet 1     EPINEPHrine (EPIPEN) 0.3 mg/0.3 mL (1:1,000) atIn As directed       fludrocortisone (FLORINEF) 0.1 mg tablet Take 0.05 mg by mouth.       FLUoxetine (PROZAC) 40 MG capsule Take 1 capsule (40 mg total) by mouth daily. 90 capsule 2     gabapentin (NEURONTIN) 300 MG capsule Take 1 capsule (300 mg total) by mouth 3 (three) times a day. 270 capsule 3     hydrocortisone (CORTEF) 10 MG tablet Take 30 mg by mouth every morning.        hydrocortisone (CORTEF) 20 MG tablet Take 20 mg by mouth daily. In the afternoon       levothyroxine (SYNTHROID, LEVOTHROID) 125 MCG tablet Take 125 mcg by mouth.       LORazepam (ATIVAN) 0.5 MG tablet Take 1-2 tablets (0.5-1 mg total) by mouth every 8 (eight) hours as needed for anxiety. 60 tablet 0     medical cannabis (Patient's own supply. Not a prescription) by Other route see administration instructions (This is NOT a prescription, and does not certify that the patient has a qualifying medical condition for medical cannabis.  The purpose of this order is  to document that the patient reports taking medical cannabis.) .       mitotane (LYSODREN) 500 mg tablet Take 1,500 mg by mouth 3 (three) times a day.              MULTIVIT,CALC,MINS/IRON/FOLIC (ONE-A-DAY WOMENS FORMULA ORAL) Take by mouth. Take 1 tablet once a day       ondansetron (ZOFRAN) 4 MG tablet Take 1 tablet (4 mg total) by mouth every 6 (six) hours as needed for nausea. 30 tablet 1     prochlorperazine (COMPAZINE) 10 MG tablet Take 10 mg by mouth.       traZODone (DESYREL) 50 MG tablet Take 100 mg by mouth.       No current facility-administered medications on file prior to visit.        ALLERGIES:  Allergies   Allergen Reactions     Ibuprofen Hives, Swelling and Other (See Comments)     Venom-Honey Bee Swelling       PHYSICAL EXAM:   /68   Pulse 84   Temp 98.2  F (36.8  C) (Oral)   Resp 12   Wt 150 lb (68 kg)   BMI 25.75 kg/m     GENERAL: Suzy is a pleasant, non-toxic  appearing female.   HEART: Regular rate and rhythm, no murmurs.   LUNGS: Clear to auscultation bilaterally, unlabored.   ABDOMEN: Soft, non-tender to palpation.   : No suprapubic or costovertebral angle tenderness.     LABS:   Recent Results (from the past 72 hour(s))   Urinalysis-UC if Indicated   Result Value Ref Range    Color, UA Yellow Colorless, Yellow, Straw, Light Yellow    Clarity, UA Slightly Cloudy (!) Clear    Glucose, UA Negative Negative    Bilirubin, UA Negative Negative    Ketones, UA Negative Negative    Specific Gravity, UA 1.015 1.005 - 1.030    Blood, UA Moderate (!) Negative    pH, UA 7.0 5.0 - 8.0    Protein, UA Negative Negative mg/dL    Urobilinogen, UA 0.2 E.U./dL 0.2 E.U./dL, 1.0 E.U./dL    Nitrite, UA Negative Negative    Leukocytes, UA Trace (!) Negative    Bacteria, UA Few (!) None Seen hpf    RBC, UA 3-5 (!) None Seen, 0-2 hpf    WBC, UA 0-5 None Seen, 0-5 hpf    Squam Epithel, UA 5-10 (!) None Seen, 0-5 lpf   Culture, Urine   Result Value Ref Range    Culture Mixture of urogenital organisms with     Culture 10,000-50,000 col/ml Group B Streptococcus (!)      ASSESSMENT & PLAN:   Suzy Rodríguez is a 33 y.o. female presenting today for evaluation of possible UTI, insomnia, and uncontrolled nausea.    1. Urine frequency  2. Urinary tract infection without hematuria, site unspecified  - Urinalysis-UC if Indicated  - Culture, Urine  - nitrofurantoin, macrocrystal-monohydrate, (MACROBID) 100 MG capsule; Take 1 capsule (100 mg total) by mouth 2 (two) times a day for 5 days.  Dispense: 10 capsule; Refill: 0    Will cover with Macrobid for possible UTI. Awaiting sensitivities.     3. Drug-induced insomnia (H)  - traZODone (DESYREL) 150 MG tablet; Take 1 tablet (150 mg total) by mouth at bedtime.  Dispense: 90 tablet; Refill: 3    Recommended increasing dose of trazodone from 100mg at bedtime to 150mg at bedtime. Will hold off on adding a sleep medication such as Ambien or Lunesta. She  should see her PCP for further discussion of sleep management and possibly a prescription of controlled substance if titration of trazodone is not effective.     4. Non-intractable vomiting with nausea, unspecified vomiting type  - promethazine (PHENERGAN) 12.5 MG tablet; Take 2 tablets (25 mg total) by mouth every 6 (six) hours as needed for nausea.  Dispense: 60 tablet; Refill: 1    Nausea uncontrolled. Will trial Phenergan.     5. Diarrhea, unspecified type  - diphenoxylate-atropine (LOMOTIL) 2.5-0.025 mg per tablet; Take 1 tablet by mouth 4 (four) times a day.  Dispense: 120 tablet; Refill: 11     Will increase Lomotil from three times a day to four times a day.     RTC: 1 month - follow up with PCP regarding insomnia    Jayna Gomez DO

## 2021-06-01 NOTE — PROGRESS NOTES
Suzy is a 35 year old who is being evaluated via a billable video visit.      How would you like to obtain your AVS? MyChart  If the video visit is dropped, the invitation should be resent by: Text to cell phone: [phone]  Will anyone else be joining your video visit? No      Video Start Time: 2:17 PM    -------------------------    Assessment/Plan:    Suzy Rodríguez is a 35 year old female presenting for:    Moderate episode of recurrent major depressive disorder (H)  Refill fluoxetine sent to the pharmacy  - FLUoxetine (PROZAC) 20 MG capsule  Dispense: 270 capsule; Refill: 1    Attention and concentration deficit  Certainly patient symptoms do sound congruent with ADHD.  Discussed with the patient that as an adult she will need a more formal evaluation.  Referral to mental health placed.  If she does have this diagnosis I am happy to provide her with medication and discussed that likely we will start extended release Adderall.  I would be able to start that medication without a visit but then would need to see her in a few weeks after starting to ensure that it is effective and she is doing well with it.  - MENTAL HEALTH REFERRAL  - Adult; Assessments and Testing; [place] [facility] [place] [facility] [phone]; We will contact you to schedule the appointment or please call with any questions        Medications Discontinued During This Encounter   Medication Reason     buPROPion (WELLBUTRIN) 100 MG tablet      FLUoxetine (PROZAC) 40 MG capsule      traZODone (DESYREL) 50 MG tablet      ondansetron (ZOFRAN) 8 MG tablet      ferrous fumarate 65 mg, Tribe. FE,-Vitamin C 125 mg (VITRON-C)  MG TABS tablet      Multiple Vitamins-Calcium (ONE-A-DAY WOMENS PO)      EPINEPHrine (EPIPEN/ADRENACLICK/OR ANY BX GENERIC EQUIV) 0.3 MG/0.3ML injection 2-pack      cholecalciferol (VITAMIN D-1000 MAX ST) 1000 units TABS      blood glucose monitoring (NO BRAND SPECIFIED) meter device kit      blood glucose  (NO BRAND SPECIFIED) test strip      FLUoxetine (PROZAC) 20 MG capsule Reorder           Chief Complaint:  Behavioral Problem          Subjective:   Suzy Rodríguez is a pleasant 35 year old female being evaluated via video visit today for the following concern/s:     Attention issues: Patient has a past medical history significant for depression.  She is currently taking fluoxetine.  She notes that throughout her life she has had issues with attention and fidgeting.  She states that she was discussing this with her sister recently and her sister recommended that she have an evaluation for ADHD.    The patient has spoken with a few of her other relatives and notes that her sister, niece and her father are all on ADHD medications.  She is unsure with their own.    She describes her symptoms as fidgeting quite frequently, not being able to finish tasks, not being able to focus on specific tasks.  She states that this is detrimental to her house as well as her job.  She was hopeful to potentially get started on some medication right away due to the symptoms.    She does suffer from some anxiety and admits that some of this could be potentially due to some anxiety as well.      12 point review of systems completed and negative except for what has been described above    History   Smoking Status     Never Smoker   Smokeless Tobacco     Never Used         Current Outpatient Medications:      acetaminophen (TYLENOL) 325 MG tablet, Take 2 tablets (650 mg) by mouth every 4 hours as needed for other (multimodal surgical pain management along with NSAIDS and opioid medication as indicated based on pain control and physical function.), Disp: 150 tablet, Rfl: 0     buPROPion (WELLBUTRIN SR) 150 MG 12 hr tablet, Take 150 mg by mouth, Disp: , Rfl:      calcium carbonate 500 mg, elemental, (OSCAL) 500 MG tablet, Take 1 tablet (500 mg) by mouth 2 times daily, Disp: 180 tablet, Rfl: 3     clonazePAM (KLONOPIN) 0.5 MG tablet, Take  "1 tablet (0.5 mg) by mouth 2 times daily as needed for anxiety, Disp: 60 tablet, Rfl: 0     diazepam (VALIUM) 5 MG tablet, Take 1 tablet (5 mg) by mouth once as needed for anxiety (MRI claustrophobia management), Disp: 2 tablet, Rfl: 0     fludrocortisone (FLORINEF) 0.1 MG tablet, Take 2 tablets (0.2 mg) by mouth daily, Disp: 180 tablet, Rfl: 3     FLUoxetine (PROZAC) 20 MG capsule, Take 3 capsules (60 mg) by mouth daily, Disp: 270 capsule, Rfl: 1     gabapentin (NEURONTIN) 300 MG capsule, Take 3 capsules (900 mg) by mouth 3 times daily, Disp: 810 capsule, Rfl: 1     hydrocortisone (CORTEF) 10 MG tablet, Take 3 tablets (30 MG) every morning and take 2 tablets (20 MG) by mouth every day at 2 PM. Additional as directed., Disp: 550 tablet, Rfl: 3     levothyroxine (SYNTHROID/LEVOTHROID) 125 MCG tablet, Take 1 tablet (125 mcg) by mouth daily (Patient taking differently: Take 125 mcg by mouth daily On Sunday's - 250 mg), Disp: 90 tablet, Rfl: 3     ondansetron (ZOFRAN) 8 MG tablet, Take 1 tablet (8 mg) by mouth every 8 hours as needed for nausea, Disp: 30 tablet, Rfl: 0     prochlorperazine (COMPAZINE) 5 MG tablet, Take 1 tablet (5 mg) by mouth every 8 hours as needed for nausea or vomiting, Disp: 90 tablet, Rfl: 3     temazepam (RESTORIL) 15 MG capsule, Take 1 capsule (15 mg) by mouth nightly as needed for sleep, Disp: 30 capsule, Rfl: 3     UNABLE TO FIND, medical cannabis (Patient's own supply. Not a prescription), Disp: , Rfl:         Objective:  No vitals were done due to the nature of this visit  Vitals - Patient Reported 1/26/2021   Height (Patient Reported) 5' 4\"   Weight (Patient Reported) 158 lb   BMI (Based on Pt Reported Ht/Wt) 27.12 kg/m2               General: No acute distress  Psych: Appropriate affect  HEENT: moist mucous membranes  Pulmonary: Breathing comfortably, speaking in complete sentences  Extremities: warm and well perfused with no edema  Skin: warm and dry with no rash         This note has " been dictated and transcribed using voice recognition software.   Any errors in transcription are unintentional and inherent to the software.    Video-Visit Details    Type of service:  Video Visit    Video End Time:2:31 PM    Originating Location (pt. Location): Home    Distant Location (provider location):  [facility]     Platform used for Video Visit: [employer]

## 2021-06-02 VITALS — BODY MASS INDEX: 26.36 KG/M2 | HEIGHT: 64 IN | WEIGHT: 154.4 LBS

## 2021-06-02 VITALS — WEIGHT: 155.25 LBS | HEIGHT: 64 IN | BODY MASS INDEX: 26.5 KG/M2

## 2021-06-02 VITALS — HEIGHT: 64 IN | BODY MASS INDEX: 27.16 KG/M2 | WEIGHT: 159.1 LBS

## 2021-06-02 NOTE — TELEPHONE ENCOUNTER
Pt calling states [facility] pharmacy system is down.   Please send rx's from today's appt to [facility] on [address] in [place].  Rx's queued for MD approval.      Pt would like a call once meds have been sent.

## 2021-06-02 NOTE — PROGRESS NOTES
Assessment/Plan:    Suzy Rodríguez is a 33 y.o. female presenting for:    1. Anxiety  She has been seeing a psychiatrist previously beneficial.  I think that she would benefit from doing this again.  We discussed her medication.  She is currently on Prozac and Wellbutrin.  We discussed weaning off the Wellbutrin and trying BuSpar instead to see if this would be more beneficial for her anxiety however she declines at this point.  She will see the psychologist she is seen in the past and a referral to psychiatry will be placed for potential med management as well.    I sent a low-dose of clonazepam to the pharmacy which she can use twice daily.  I discussed that this will likely make her drowsy but hopefully help with her anxiety and irritability.  Again, we discussed that this is not a good long-term medication but may be beneficial while she gets in to see her therapist and potentially a psychiatrist.  - Ambulatory referral to Psychiatry    2. Malignant neoplasm of adrenal gland, unspecified laterality, unspecified part (H)  High-dose influenza vaccine given today per recommendations from oncology due to active chemotherapy  - Influenza High Dose, Seasonal 65+ yrs    3. Immunization due  - Influenza High Dose, Seasonal 65+ yrs    I personally spent 25 minutes with this patient over half of which spent in face-to-face counseling and coordination of care regarding her severe anxiety.    Medications Discontinued During This Encounter   Medication Reason     prochlorperazine (COMPAZINE) 10 MG tablet Reorder           Chief Complaint:  Chief Complaint   Patient presents with     Anxiety       Subjective:   Suzy Rodríguez is a pleasant 33-year-old female with a past medical history significant for anxiety as well as a malignant neoplasm of the adrenal gland.  This was found about a year ago on a scan which is being done for kidney stones.    She has surgeries but the margins were not clear.  She has been having  chemotherapy and she is on a 5-year chemotherapy plan.  She is seeing an oncologist in [place] as he is told her to be the expert in this field.    She notes that ever since her diagnosis she has been increasingly anxious.  She takes Prozac 40 mg daily as well as Wellbutrin 300 mg daily total.  She states that every 3 months she gets a CT scan and after the CT scan is clear she will get 1 to 2 weeks of solace from her anxiety but then her anxiety starts to come back worrying about the cancer spreading.    She is actually been doing very well per her report and the chemotherapy has been working with no recurrences.    She did see a therapist at the [facility] for a while however is not seeing them anymore.  She did think that that was helpful and still has their number.    She notes that she has been having mild panic attacks which she needs to step away from what she is doing.  She has 2 children and feels irritable with them often.    She has a history of hypothyroidism with a recent normal TSH.    12 point review of systems completed and negative except for what has been described above    Social History     Tobacco Use   Smoking Status Never Smoker   Smokeless Tobacco Never Used       Current Outpatient Medications   Medication Sig Note     acetaminophen (TYLENOL) 325 MG tablet Take 650 mg by mouth.      amino acids (DAILY AMINO ORAL) Take 325 mg by mouth daily. 6/15/2018: Received from: Mary Received Sig: Take 325 mg by mouth daily (with breakfast)     buPROPion (WELLBUTRIN) 100 MG tablet Take 200 mg in AM and 100 mg in PM      calcium carbonate (CALCIUM ANTACID) 400 mg calcium (1,000 mg) Chew Chew. Take 1 tablet once a day      cholecalciferol, vitamin D3, 1,000 unit tablet Take 2,000 Units by mouth daily.      EPINEPHrine (EPIPEN) 0.3 mg/0.3 mL (1:1,000) atIn As directed      fludrocortisone (FLORINEF) 0.1 mg tablet Take 0.05 mg by mouth.      FLUoxetine (PROZAC) 40 MG capsule Take 1 capsule  (40 mg total) by mouth daily.      gabapentin (NEURONTIN) 300 MG capsule Take 1 capsule (300 mg total) by mouth 3 (three) times a day.      hydrocortisone (CORTEF) 10 MG tablet Take 30 mg by mouth every morning.  7/6/2018: Received from: Mary Received Sig: Take 1 tablet (10 mg) by mouth daily At 2pm until follow up with Endocrinology (total: 30mg daily)     hydrocortisone (CORTEF) 20 MG tablet Take 20 mg by mouth daily. In the afternoon 7/6/2018: Received from: Mary Received Sig: Take 1 tablet (20 mg) by mouth every morning (then 10mg at 2pm) until followup with Endocrinology     levothyroxine (SYNTHROID, LEVOTHROID) 125 MCG tablet Take 125 mcg by mouth.      medical cannabis (Patient's own supply. Not a prescription) by Other route see administration instructions (This is NOT a prescription, and does not certify that the patient has a qualifying medical condition for medical cannabis.  The purpose of this order is  to document that the patient reports taking medical cannabis.) . 7/19/2019: Managed by her oncologist.     mitotane (LYSODREN) 500 mg tablet Take 2,000 mg by mouth 3 (three) times a day.             MULTIVIT,CALC,MINS/IRON/FOLIC (ONE-A-DAY WOMENS FORMULA ORAL) Take by mouth. Take 1 tablet once a day      ondansetron (ZOFRAN) 4 MG tablet Take 1 tablet (4 mg total) by mouth every 6 (six) hours as needed for nausea.      promethazine (PHENERGAN) 12.5 MG tablet Take 2 tablets (25 mg total) by mouth every 6 (six) hours as needed for nausea.      traZODone (DESYREL) 150 MG tablet Take 1 tablet (150 mg total) by mouth at bedtime.      clonazePAM (KLONOPIN) 0.5 MG tablet Take 1 tablet (0.5 mg total) by mouth 2 (two) times a day as needed for anxiety.      prochlorperazine (COMPAZINE) 10 MG tablet Take 1 tablet (10 mg total) by mouth every 8 (eight) hours as needed for nausea.          Objective:  Vitals:    10/15/19 1033   BP: 100/64   Pulse: 64   Resp: 12   Temp: 97.6  F (36.4  C)   TempSrc: Oral  "  Weight: 145 lb 4 oz (65.9 kg)   Height: 5' 4\" (1.626 m)       Body mass index is 24.93 kg/m .    Vital signs reviewed and stable  General: No acute distress, tremors noted  Psych: Appropriate affect, seems anxious and tremulous, good insight  HEENT: moist mucous membranes  Cardiovascular: regular rate and rhythm with no murmur  Pulmonary: clear to auscultation bilaterally with no wheeze  Abdomen: soft, non tender, non distended with normo-active bowel sounds  Extremities: warm and well perfused with no edema  Skin: warm and dry with no rash         This note has been dictated and transcribed using voice recognition software.   Any errors in transcription are unintentional and inherent to the software.  "

## 2021-06-02 NOTE — TELEPHONE ENCOUNTER
RN cannot approve Refill Request    RN can NOT refill this medication med is not covered by policy/route to provider. Last office visit: 2/20/2019 Gillian Garzon MD Last Physical: 3/24/2017 Last MTM visit: Visit date not found Last visit same specialty: 10/15/2019 Ivette Leal MD.  Next visit within 3 mo: Visit date not found  Next physical within 3 mo: Visit date not found      Mala Vernon, [facility] Triage/Med Refill 11/3/2019    Requested Prescriptions   Pending Prescriptions Disp Refills     nitrofurantoin, macrocrystal-monohydrate, (MACROBID) 100 MG capsule 10 capsule 0     Sig: Take 1 capsule (100 mg total) by mouth 2 (two) times a day for 5 days.       There is no refill protocol information for this order

## 2021-06-03 VITALS
HEART RATE: 64 BPM | WEIGHT: 145.25 LBS | TEMPERATURE: 97.6 F | HEIGHT: 64 IN | DIASTOLIC BLOOD PRESSURE: 64 MMHG | SYSTOLIC BLOOD PRESSURE: 100 MMHG | BODY MASS INDEX: 24.8 KG/M2 | RESPIRATION RATE: 12 BRPM

## 2021-06-03 VITALS
RESPIRATION RATE: 12 BRPM | WEIGHT: 150 LBS | HEART RATE: 84 BPM | SYSTOLIC BLOOD PRESSURE: 102 MMHG | DIASTOLIC BLOOD PRESSURE: 68 MMHG | BODY MASS INDEX: 25.75 KG/M2 | TEMPERATURE: 98.2 F

## 2021-06-03 VITALS
HEART RATE: 84 BPM | RESPIRATION RATE: 20 BRPM | HEIGHT: 64 IN | DIASTOLIC BLOOD PRESSURE: 68 MMHG | SYSTOLIC BLOOD PRESSURE: 100 MMHG | WEIGHT: 147.1 LBS | BODY MASS INDEX: 25.11 KG/M2

## 2021-06-03 VITALS — WEIGHT: 144 LBS | BODY MASS INDEX: 24.59 KG/M2 | HEIGHT: 64 IN

## 2021-06-03 VITALS
BODY MASS INDEX: 25.27 KG/M2 | TEMPERATURE: 97.8 F | RESPIRATION RATE: 16 BRPM | SYSTOLIC BLOOD PRESSURE: 114 MMHG | WEIGHT: 148 LBS | HEART RATE: 80 BPM | DIASTOLIC BLOOD PRESSURE: 60 MMHG | HEIGHT: 64 IN

## 2021-06-03 VITALS — HEIGHT: 64 IN | WEIGHT: 147.19 LBS | BODY MASS INDEX: 25.13 KG/M2

## 2021-06-03 NOTE — PROGRESS NOTES
Assessment/Plan:     1. Routine general medical examination at a health care facility  Routine history and physical, updated in EMR.  Labs have been updated through oncology and were reviewed today.  Pap smear is up to date.  Immunizations updated today.  See below for problem-specific plans.    2. Acute cystitis without hematuria  Will treat again with nitrofurantoin, which was sensitive last time.  Discussed ways to help prevent recurrent UTIs.  - Urinalysis-UC if Indicated  - Culture, Urine  - nitrofurantoin, macrocrystal-monohydrate, (MACROBID) 100 MG capsule; Take 1 capsule (100 mg total) by mouth 2 (two) times a day for 7 days.  Dispense: 14 capsule; Refill: 0    3. Malignant neoplasm of cortex of right adrenal gland (H)  Follows closely with oncology and has a follow-up visit today.  - Ambulatory referral to Psychology    4. Severe episode of recurrent major depressive disorder, without psychotic features (H)  Patient would like a referral to a new therapist.  Also discussed follow-up with psychiatry, patient would like to start by seeing a new therapist.  - Ambulatory referral to Psychology    5. Adnexal cyst  Patient states she has an appointment scheduled with Ob/Gyn through the [facility] for evaluation of this.    6. Impaired driving skills  Gave patient a handicap parking permit today for her  to use, but did check the box that I cannot certify that patient is medically qualified to drive.  Based on her recent history, suggested she not drive without completing a formal driving evaluation.  She agrees to let her  drive.      Subjective:      Suzy Rodríguez is a 34 y.o. female who presents for an annual exam. The patient is sexually active. The patient participates in regular exercise: no. The patient reports that there is not domestic violence in her life.     1. Feels that her UTI symptoms have returned.  She sat in a hot tub and immediately had return of burning with urination  "and frequency.  2. Mood - Dr. Leal talked about referral to Psychology per patient and she would like this.  She is not currently seeing a therapist.  Main depression symptoms are related to fatigue, but also has trouble concentrating and feels bad about herself.  3. Handicap parking permit - wondering if she can have this.  Says that her car has \"many dents and scratches from me running into things\".  Says that she hit a \"visitor parking\" sign when she came to clinic today.  Says that her depth perception is impaired due to chemotherapy.  4. Dense breast tissue - wondering if she should have a mammogram for this purpose.  Discussed that this is not routinely recommended with a normal breast exam unless she has a family history of breast cancer at an early age (which she does not) or is recommended to pursue mammography by oncology.    Healthy Habits:   Regular Exercise: Yes  Sunscreen Use: Yes  Healthy Diet: Yes  Dental Visits Regularly: Yes  Seat Belt: Yes  Sexually active: Yes  Self Breast Exam Monthly:Yes  Lipid Profile: Yes  Glucose Screen: Yes      Immunization History   Administered Date(s) Administered     Influenza high dose,seasonal,PF, 65+ yrs 10/15/2019     Influenza, inj, historic,unspecified 11/08/2018     Influenza, seasonal,quad inj 6-35 mos 09/27/2013     Influenza,seasonal,quad inj =/> 6months 10/08/2015, 09/22/2016     Immunization status: missing doses of Tdap.    No exam data present    Gynecologic History  No LMP recorded. (Menstrual status: Contraception).  Contraception: IUD  Last Pap: 10/8/15. Results were: normal and HPV neg      OB History   No obstetric history on file.       Current Outpatient Medications   Medication Sig Dispense Refill     acetaminophen (TYLENOL) 325 MG tablet Take 650 mg by mouth.       amino acids (DAILY AMINO ORAL) Take 325 mg by mouth daily.       buPROPion (WELLBUTRIN) 100 MG tablet Take 200 mg in AM and 100 mg in  tablet 3     calcium carbonate (CALCIUM " ANTACID) 400 mg calcium (1,000 mg) Chew Chew. Take 1 tablet once a day       cholecalciferol, vitamin D3, 1,000 unit tablet Take 2,000 Units by mouth daily.       clonazePAM (KLONOPIN) 0.5 MG tablet Take 1 tablet (0.5 mg total) by mouth 2 (two) times a day as needed for anxiety. 60 tablet 0     EPINEPHrine (EPIPEN) 0.3 mg/0.3 mL (1:1,000) atIn As directed       fludrocortisone (FLORINEF) 0.1 mg tablet Take 0.05 mg by mouth.       FLUoxetine (PROZAC) 40 MG capsule Take 1 capsule (40 mg total) by mouth daily. 90 capsule 2     gabapentin (NEURONTIN) 300 MG capsule Take 1 capsule (300 mg total) by mouth 3 (three) times a day. 270 capsule 3     hydrocortisone (CORTEF) 10 MG tablet Take 30 mg by mouth every morning.        hydrocortisone (CORTEF) 20 MG tablet Take 20 mg by mouth daily. In the afternoon       levothyroxine (SYNTHROID, LEVOTHROID) 125 MCG tablet Take 125 mcg by mouth.       medical cannabis (Patient's own supply. Not a prescription) by Other route see administration instructions (This is NOT a prescription, and does not certify that the patient has a qualifying medical condition for medical cannabis.  The purpose of this order is  to document that the patient reports taking medical cannabis.) .       mitotane (LYSODREN) 500 mg tablet Take 2,000 mg by mouth 3 (three) times a day.              MULTIVIT,CALC,MINS/IRON/FOLIC (ONE-A-DAY WOMENS FORMULA ORAL) Take by mouth. Take 1 tablet once a day       ondansetron (ZOFRAN) 4 MG tablet Take 1 tablet (4 mg total) by mouth every 6 (six) hours as needed for nausea. 30 tablet 1     prochlorperazine (COMPAZINE) 10 MG tablet Take 1 tablet (10 mg total) by mouth every 8 (eight) hours as needed for nausea. 90 tablet 0     promethazine (PHENERGAN) 12.5 MG tablet Take 2 tablets (25 mg total) by mouth every 6 (six) hours as needed for nausea. 60 tablet 1     traZODone (DESYREL) 150 MG tablet Take 1 tablet (150 mg total) by mouth at bedtime. 90 tablet 3     No current  "facility-administered medications for this visit.      Past Medical History:   Diagnosis Date     Anemia     thought due to heavy menses.     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     SHREYA on CPAP 2015    Stopped using CPAP the  after weight loss as even at lower pressure/adjustment couldn't tolerate the cpap. Resting well, cautioned to watch for any signs of fatigue and consider \"titration study\" in the future to see if cpap is still required at her new weight.     Pneumonia     hx of recurrent pneumonia issues/respiratory infections.     Polycystic ovary syndrome     able to get pregnant, not on meds.     Pulmonary embolism (H)  or so    briefly on wafarin.     Sleep apnea     cpap     Vitamin D deficiency      Past Surgical History:   Procedure Laterality Date     ADRENALECTOMY Right       SECTION, CLASSIC      x2     [place] CYSTO/URETERO W/LITHOTRIPSY &INDWELL STENT INSRT Left 2018    Procedure: CYSTOSCOPY, LEFT URETEROSCOPY LASER LITHOTRIPSY STENT INSERTION;  Surgeon: Skyler Delvalle MD;  Location: [facility];  Service: Urology     [place] LAP GASTRIC BYPASS/DERICK-EN-Y N/A 2016    Procedure: GASTRIC BYPASS LAPAROSCOPIC DERICK-EN-Y;  Surgeon: Randy Sutherland MD;  Location: [facility];  Service: General     TUBAL LIGATION       WISDOM TOOTH EXTRACTION Bilateral      Ibuprofen and Venom-honey bee  Family History   Problem Relation Age of Onset     Diabetes Mother      Diabetes Father      Hypertension Father      COPD Father      Cancer Maternal Grandmother         gastric     Urolithiasis Maternal Grandmother      Heart disease Maternal Grandfather      Cancer Maternal " Grandfather         lung     Heart disease Paternal Grandfather      Breast cancer Neg Hx      Colon cancer Neg Hx      Prostate cancer Neg Hx      Stroke Neg Hx      Clotting disorder Neg Hx      Gout Neg Hx      Social History     Socioeconomic History     Marital status:      Spouse name: Not on file     Number of children: Not on file     Years of education: Not on file     Highest education level: Not on file   Occupational History     Occupation: Management   Social Needs     Financial resource strain: Not on file     Food insecurity:     Worry: Not on file     Inability: Not on file     Transportation needs:     Medical: Not on file     Non-medical: Not on file   Tobacco Use     Smoking status: Never Smoker     Smokeless tobacco: Never Used   Substance and Sexual Activity     Alcohol use: Yes     Comment: about 1 drink per week     Drug use: No     Sexual activity: Never     Partners: Male     Birth control/protection: Other, Surgical     Comment:  Ashvin   Lifestyle     Physical activity:     Days per week: Not on file     Minutes per session: Not on file     Stress: Not on file   Relationships     Social connections:     Talks on phone: Not on file     Gets together: Not on file     Attends Taoist service: Not on file     Active member of club or organization: Not on file     Attends meetings of clubs or organizations: Not on file     Relationship status: Not on file     Intimate partner violence:     Fear of current or ex partner: Not on file     Emotionally abused: Not on file     Physically abused: Not on file     Forced sexual activity: Not on file   Other Topics Concern     Not on file   Social History Narrative     Not on file       Review of Systems  General:  fatigue  Eyes: Denies problem  Ears/Nose/Throat: Denies problem  Cardiovascular: Denies problem  Respiratory:  Denies problem  Gastrointestinal:  Denies problem  Genitourinary: dysuria  Musculoskeletal:  Denies problem  Skin:  "Denies problem  Neurologic: shakiness, related to mitotane per patient  Psychiatric: depression  Endocrine: following with endocrinology for hormone replacement  Heme/Lymphatic: Denies problem   Allergic/Immunologic: Denies problem        Objective:         Vitals:    11/06/19 0954   BP: 100/68   Pulse: 84   Resp: 20   Weight: 147 lb 1.6 oz (66.7 kg)   Height: 5' 4\" (1.626 m)     Body mass index is 25.25 kg/m .    Physical Exam:  General Appearance: Alert, cooperative  Head: Normocephalic, without obvious abnormality, atraumatic  Eyes: PERRL, conjunctiva/corneas clear, EOM's intact  Ears: Normal TM's and external ear canals, both ears  Nose: Nares normal, septum midline, mucosa normal, no drainage  Throat: Lips, mucosa, and tongue normal; teeth and gums normal  Neck: Supple, symmetrical, trachea midline, no adenopathy; thyroid: not enlarged, symmetric, no tenderness/mass/nodules  Back: Symmetric, no curvature, ROM normal, no CVA tenderness  Lungs: Clear to auscultation bilaterally, respirations unlabored  Breasts: No breast masses, tenderness, asymmetry, or nipple discharge  Heart: Regular rate and rhythm, S1 and S2 normal, no murmur, rub, or gallop  Abdomen: Soft, non-tender, bowel sounds active all four quadrants, no masses, no organomegaly  Pelvic:Not examined  Extremities: Extremities normal, atraumatic, no cyanosis or edema  Skin: Skin color, texture, turgor normal, no rashes or lesions  Lymph nodes: Cervical, supraclavicular, and axillary nodes normal  Neurologic: Tremulous  Psychiatric: Depressed mood, flat affect       "

## 2021-06-03 NOTE — PROGRESS NOTES
Assessment/ Plan     1. Dysuria  Patient has had a couple day history urinary frequency and odor.  She has had several bladder infections over the last couple of months.  As her symptoms are currently not severe and her microscopic did not show any red or white blood cells, I think it would be worth waiting for the culture to treat her.  Recommend that she push fluids as I think she is a little bit dehydrated from her chemotherapy.  - Urinalysis-UC if Indicated  - Culture, Urine    2. Anxiety  Patient is having quite a bit of anxiety that is currently not under good control with her fluoxetine.  I did refill her clonazepam today for her to take twice daily as needed.  - clonazePAM (KLONOPIN) 0.5 MG tablet; Take 1 tablet (0.5 mg total) by mouth 2 (two) times a day as needed for anxiety.  Dispense: 60 tablet; Refill: 0      Subjective:       Suzy Rodríguez is a 34 y.o. female who presents for possible UTI.  She has complex medical issues currently and is having chemotherapy for adrenal cancer.  She said to UTIs in the last month that were confirmed with urine culture.  She was treated with nitrofurantoin which both episodes were sensitive.  She states this last time things felt better until she stopped taking the medication.  She really is just experiencing urinary frequency and foul odor of her urine.  She is not having her typical dysuria.  She denies any flank pain or fever.  She overall is just feeling crummy from the chemotherapy, but this is not new.  She does admit to a lot of CT they found a pulmonary nodule that she is concerned that might be cancerous.  She states she is not sleeping at night.  She is having frequent panic attacks.  She had been on clonazepam in the past, but does not have a prescription for this anymore.  She would like to restart that if I am willing to refill it.  She is on fluoxetine 40 mg.    Relevant past medical, family, surgical, and social history reviewed with patient, unless  noted in HPI, not pertinent for this visit.  Medications were discussed and reconciled.   Review of Systems   A 12 point comprehensive review of systems was negative except as noted.      Current Outpatient Medications   Medication Sig Dispense Refill     acetaminophen (TYLENOL) 325 MG tablet Take 650 mg by mouth.       amino acids (DAILY AMINO ORAL) Take 325 mg by mouth daily.       buPROPion (WELLBUTRIN) 100 MG tablet Take 200 mg in AM and 100 mg in  tablet 3     calcium carbonate (CALCIUM ANTACID) 400 mg calcium (1,000 mg) Chew Chew. Take 1 tablet once a day       cholecalciferol, vitamin D3, 1,000 unit tablet Take 2,000 Units by mouth daily.       clonazePAM (KLONOPIN) 0.5 MG tablet Take 1 tablet (0.5 mg total) by mouth 2 (two) times a day as needed for anxiety. 60 tablet 0     EPINEPHrine (EPIPEN) 0.3 mg/0.3 mL (1:1,000) atIn As directed       fludrocortisone (FLORINEF) 0.1 mg tablet Take 0.05 mg by mouth.       FLUoxetine (PROZAC) 40 MG capsule Take 1 capsule (40 mg total) by mouth daily. 90 capsule 2     gabapentin (NEURONTIN) 300 MG capsule Take 1 capsule (300 mg total) by mouth 3 (three) times a day. 270 capsule 3     hydrocortisone (CORTEF) 10 MG tablet Take 30 mg by mouth every morning.        hydrocortisone (CORTEF) 20 MG tablet Take 20 mg by mouth daily. In the afternoon       levothyroxine (SYNTHROID, LEVOTHROID) 125 MCG tablet Take 125 mcg by mouth.       medical cannabis (Patient's own supply. Not a prescription) by Other route see administration instructions (This is NOT a prescription, and does not certify that the patient has a qualifying medical condition for medical cannabis.  The purpose of this order is  to document that the patient reports taking medical cannabis.) .       mitotane (LYSODREN) 500 mg tablet Take 2,000 mg by mouth 3 (three) times a day.              MULTIVIT,CALC,MINS/IRON/FOLIC (ONE-A-DAY WOMENS FORMULA ORAL) Take by mouth. Take 1 tablet once a day       ondansetron  "(ZOFRAN) 4 MG tablet Take 1 tablet (4 mg total) by mouth every 6 (six) hours as needed for nausea. 30 tablet 1     prochlorperazine (COMPAZINE) 10 MG tablet Take 1 tablet (10 mg total) by mouth every 8 (eight) hours as needed for nausea. 90 tablet 0     promethazine (PHENERGAN) 12.5 MG tablet Take 2 tablets (25 mg total) by mouth every 6 (six) hours as needed for nausea. 60 tablet 1     traZODone (DESYREL) 150 MG tablet Take 1 tablet (150 mg total) by mouth at bedtime. 90 tablet 3     No current facility-administered medications for this visit.        Objective:      /60   Pulse 80   Temp 97.8  F (36.6  C)   Resp 16   Ht 5' 4\" (1.626 m)   Wt 148 lb (67.1 kg)   BMI 25.40 kg/m        General appearance: alert, appears stated age and cooperative  Appears mildly ill, tearful at times  Back: . No CVA tenderness.  Lungs: clear to auscultation bilaterally  Heart: regular rate and rhythm, S1, S2 normal, no murmur, click, rub or gallop      Recent Results (from the past 168 hour(s))   Urinalysis-UC if Indicated   Result Value Ref Range    Color, UA Yellow Colorless, Yellow, Straw, Light Yellow    Clarity, UA Cloudy (!) Clear    Glucose, UA Negative Negative    Bilirubin, UA Negative Negative    Ketones, UA Negative Negative    Specific Gravity, UA 1.010 1.005 - 1.030    Blood, UA Moderate (!) Negative    pH, UA 7.0 5.0 - 8.0    Protein, UA Negative Negative mg/dL    Urobilinogen, UA 1.0 E.U./dL 0.2 E.U./dL, 1.0 E.U./dL    Nitrite, UA Positive (!) Negative    Leukocytes, UA Small (!) Negative    Bacteria, UA Many (!) None Seen hpf    RBC, UA 0-2 None Seen, 0-2 hpf    WBC, UA 0-5 None Seen, 0-5 hpf    Squam Epithel, UA 10-25 (!) None Seen, 0-5 lpf          This note has been dictated using voice recognition software. Any grammatical or context distortions are unintentional and inherent to the software  "

## 2021-06-03 NOTE — TELEPHONE ENCOUNTER
RN cannot approve Refill Request    RN can NOT refill this medication med is not covered by policy/route to provider. Last office visit: 2/20/2019 Gillian Garzon MD Last Physical: 11/6/2019 Last MTM visit: Visit date not found Last visit same specialty: 10/15/2019 Ivette Leal MD.  Next visit within 3 mo: Visit date not found  Next physical within 3 mo: Visit date not found      Jessica Post, [facility] Triage/Med Refill 11/14/2019    Requested Prescriptions   Pending Prescriptions Disp Refills     prochlorperazine (COMPAZINE) 10 MG tablet 90 tablet 0     Sig: Take 1 tablet (10 mg total) by mouth every 8 (eight) hours as needed for nausea.       There is no refill protocol information for this order

## 2021-06-03 NOTE — TELEPHONE ENCOUNTER
Left message to call back for: simon  Information to relay to patient:  See note below,  pt is also due for a Px, would she be willing to schedule that as well? She is going to need FU in some way for medication refills and she hasnt had a Px since march 2017,

## 2021-06-04 ENCOUNTER — ANCILLARY PROCEDURE (OUTPATIENT)
Dept: CT IMAGING | Facility: CLINIC | Age: 36
End: 2021-06-04
Attending: INTERNAL MEDICINE
Payer: COMMERCIAL

## 2021-06-04 ENCOUNTER — TELEPHONE (OUTPATIENT)
Dept: ONCOLOGY | Facility: CLINIC | Age: 36
End: 2021-06-04

## 2021-06-04 VITALS
RESPIRATION RATE: 16 BRPM | HEART RATE: 72 BPM | HEIGHT: 64 IN | WEIGHT: 148.1 LBS | DIASTOLIC BLOOD PRESSURE: 62 MMHG | BODY MASS INDEX: 25.29 KG/M2 | SYSTOLIC BLOOD PRESSURE: 102 MMHG

## 2021-06-04 VITALS
SYSTOLIC BLOOD PRESSURE: 114 MMHG | RESPIRATION RATE: 16 BRPM | DIASTOLIC BLOOD PRESSURE: 80 MMHG | BODY MASS INDEX: 26.49 KG/M2 | HEIGHT: 64 IN | HEART RATE: 75 BPM | TEMPERATURE: 97.3 F | WEIGHT: 155.13 LBS

## 2021-06-04 DIAGNOSIS — C74.01 MALIGNANT NEOPLASM OF CORTEX OF RIGHT ADRENAL GLAND (H): ICD-10-CM

## 2021-06-04 DIAGNOSIS — E27.40 ADRENAL INSUFFICIENCY (H): ICD-10-CM

## 2021-06-04 DIAGNOSIS — C74.01 ADRENAL CORTEX CANCER, RIGHT (H): ICD-10-CM

## 2021-06-04 DIAGNOSIS — F51.01 PRIMARY INSOMNIA: ICD-10-CM

## 2021-06-04 LAB
ALBUMIN SERPL-MCNC: 2.8 G/DL (ref 3.4–5)
ALP SERPL-CCNC: 77 U/L (ref 40–150)
ALT SERPL W P-5'-P-CCNC: 17 U/L (ref 0–50)
ANION GAP SERPL CALCULATED.3IONS-SCNC: 6 MMOL/L (ref 3–14)
AST SERPL W P-5'-P-CCNC: 15 U/L (ref 0–45)
BASOPHILS # BLD AUTO: 0 10E9/L (ref 0–0.2)
BASOPHILS NFR BLD AUTO: 0.8 %
BILIRUB SERPL-MCNC: 0.2 MG/DL (ref 0.2–1.3)
BUN SERPL-MCNC: 11 MG/DL (ref 7–30)
CALCIUM SERPL-MCNC: 7.9 MG/DL (ref 8.5–10.1)
CHLORIDE SERPL-SCNC: 105 MMOL/L (ref 94–109)
CHOLEST SERPL-MCNC: 157 MG/DL
CO2 SERPL-SCNC: 27 MMOL/L (ref 20–32)
CREAT SERPL-MCNC: 0.68 MG/DL (ref 0.52–1.04)
DIFFERENTIAL METHOD BLD: ABNORMAL
EOSINOPHIL # BLD AUTO: 0 10E9/L (ref 0–0.7)
EOSINOPHIL NFR BLD AUTO: 1 %
ERYTHROCYTE [DISTWIDTH] IN BLOOD BY AUTOMATED COUNT: 13.3 % (ref 10–15)
GFR SERPL CREATININE-BSD FRML MDRD: >90 ML/MIN/{1.73_M2}
GLUCOSE SERPL-MCNC: 81 MG/DL (ref 70–99)
HCT VFR BLD AUTO: 40.2 % (ref 35–47)
HDLC SERPL-MCNC: 103 MG/DL
HGB BLD-MCNC: 13.2 G/DL (ref 11.7–15.7)
IMM GRANULOCYTES # BLD: 0 10E9/L (ref 0–0.4)
IMM GRANULOCYTES NFR BLD: 0.3 %
LDLC SERPL CALC-MCNC: 31 MG/DL
LYMPHOCYTES # BLD AUTO: 1 10E9/L (ref 0.8–5.3)
LYMPHOCYTES NFR BLD AUTO: 26.2 %
MCH RBC QN AUTO: 31.6 PG (ref 26.5–33)
MCHC RBC AUTO-ENTMCNC: 32.8 G/DL (ref 31.5–36.5)
MCV RBC AUTO: 96 FL (ref 78–100)
MISCELLANEOUS TEST: NORMAL
MONOCYTES # BLD AUTO: 0.3 10E9/L (ref 0–1.3)
MONOCYTES NFR BLD AUTO: 7.2 %
NEUTROPHILS # BLD AUTO: 2.5 10E9/L (ref 1.6–8.3)
NEUTROPHILS NFR BLD AUTO: 64.5 %
NONHDLC SERPL-MCNC: 54 MG/DL
NRBC # BLD AUTO: 0 10*3/UL
NRBC BLD AUTO-RTO: 0 /100
PLATELET # BLD AUTO: 149 10E9/L (ref 150–450)
POTASSIUM SERPL-SCNC: 3.9 MMOL/L (ref 3.4–5.3)
PROT SERPL-MCNC: 5.6 G/DL (ref 6.8–8.8)
RBC # BLD AUTO: 4.18 10E12/L (ref 3.8–5.2)
SODIUM SERPL-SCNC: 138 MMOL/L (ref 133–144)
T4 FREE SERPL-MCNC: 0.93 NG/DL (ref 0.76–1.46)
TRIGL SERPL-MCNC: 116 MG/DL
TSH SERPL DL<=0.005 MIU/L-ACNC: 0.44 MU/L (ref 0.4–4)
WBC # BLD AUTO: 3.9 10E9/L (ref 4–11)

## 2021-06-04 PROCEDURE — 80375 DRUG/SUBSTANCE NOS 1-3: CPT | Mod: 90 | Performed by: PATHOLOGY

## 2021-06-04 PROCEDURE — 82024 ASSAY OF ACTH: CPT | Mod: 90 | Performed by: PATHOLOGY

## 2021-06-04 PROCEDURE — 80050 GENERAL HEALTH PANEL: CPT | Performed by: PATHOLOGY

## 2021-06-04 PROCEDURE — 99000 SPECIMEN HANDLING OFFICE-LAB: CPT | Performed by: PATHOLOGY

## 2021-06-04 PROCEDURE — 74177 CT ABD & PELVIS W/CONTRAST: CPT | Performed by: RADIOLOGY

## 2021-06-04 PROCEDURE — 71260 CT THORAX DX C+: CPT | Performed by: RADIOLOGY

## 2021-06-04 PROCEDURE — 80061 LIPID PANEL: CPT | Performed by: PATHOLOGY

## 2021-06-04 PROCEDURE — 82627 DEHYDROEPIANDROSTERONE: CPT | Mod: 90 | Performed by: PATHOLOGY

## 2021-06-04 PROCEDURE — 84439 ASSAY OF FREE THYROXINE: CPT | Performed by: PATHOLOGY

## 2021-06-04 PROCEDURE — 36415 COLL VENOUS BLD VENIPUNCTURE: CPT | Performed by: PATHOLOGY

## 2021-06-04 RX ORDER — IOPAMIDOL 755 MG/ML
92 INJECTION, SOLUTION INTRAVASCULAR ONCE
Status: COMPLETED | OUTPATIENT
Start: 2021-06-04 | End: 2021-06-04

## 2021-06-04 RX ADMIN — IOPAMIDOL 92 ML: 755 INJECTION, SOLUTION INTRAVASCULAR at 11:33

## 2021-06-04 NOTE — TELEPHONE ENCOUNTER
Spoke to pt, relayed MD message below.  Offered to schedule lab only appt in 2 weeks, pt states she will call back to schedule.

## 2021-06-04 NOTE — TELEPHONE ENCOUNTER
Controlled Substance Refill Request  Medication:   Requested Prescriptions     Pending Prescriptions Disp Refills     clonazePAM (KLONOPIN) 0.5 MG tablet 60 tablet 0     Sig: Take 1 tablet (0.5 mg total) by mouth 2 (two) times a day as needed for anxiety.     Date Last Fill: 12/3/19  [facility]   Submit electronically to pharmacy  Controlled Substance Agreement on File:   Encounter-Level CSA Scan Date:    There are no encounter-level csa scan date.       Last office visit: 12/3/2019 Faby Rae MD

## 2021-06-04 NOTE — TELEPHONE ENCOUNTER
Annamarie from [facility] lab called, clarifying original standing orders for Dr. Cintron can be processed as appt notes said orders for Dr. Hernadez.     This writer gave verbal okay to process Dr. Cintron's orders.      Routed to Care Team to update future standing orders to new provider Dr. Hernadez.

## 2021-06-04 NOTE — TELEPHONE ENCOUNTER
RN cannot approve Refill Request    RN can NOT refill this medication med is not covered by policy/route to provider. Last office visit: 2/20/2019 Gillian Garzon MD Last Physical: 11/6/2019 Last MTM visit: Visit date not found Last visit same specialty: 12/3/2019 Faby Rae MD.  Next visit within 3 mo: Visit date not found  Next physical within 3 mo: Visit date not found      Aretha Baez, [facility] Triage/Med Refill 1/5/2020    Requested Prescriptions   Pending Prescriptions Disp Refills     prochlorperazine (COMPAZINE) 10 MG tablet [Pharmacy Med Name: PROCHLORPERAZINE MALEATE 10MG TABS] 90 tablet 0     Sig: TAKE ONE TABLET BY MOUTH EVERY 8 HOURS AS NEEDED FOR NAUSEA       There is no refill protocol information for this order

## 2021-06-04 NOTE — TELEPHONE ENCOUNTER
Please let pt know that her urine culture did grow out bacteria. I would recommend a different abx than she had the last 2 times - I sent cephalexin to her pharmacy,  I would then recommend having a urine sample repeated in about 2 weeks to make sure she is clearing the infection - she can do a lab only.

## 2021-06-05 NOTE — PROGRESS NOTES
Assessment/Plan:       1. 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 Adrenal insufficiency (H)  Patient follows closely with Oncology and Endocrinology.  Her steroids and chemotherapy were recently adjusted, and her vision issues have resolved and tremors are improved per patient.  I will write a letter that to my knowledge she is safe to operate a motor vehicle.  She agrees to follow up with Oncology or stop driving if she develops similar toxicity to her chemotherapy or steroids in future.  - Ambulatory referral to Psychiatry    3. Severe episode of recurrent major depressive disorder, without psychotic features (H)  Decreased Wellbutrin to 100 mg twice daily to see if this improves her tremors.  Also recommended referral to Psychiatry for further management of patient's mood with the complexity of her medications and multiple medications tried in the past without benefit.  She has seen a therapist through Oncology recently.  - buPROPion (WELLBUTRIN) 100 MG tablet; Take 100 mg in AM and 100 mg in PM  Dispense: 180 tablet; Refill: 3  - Ambulatory referral to Psychiatry    4. Generalized anxiety disorder  See above #3.  Recently received clonazepam refill from another MD within our system, Oncology warned to use with caution as she had previously developed a dependence on lorazepam and was having some problematic use.  - Ambulatory referral to Psychiatry        Subjective:       Suzy Rodríguez is a 34 y.o. female who presents for primarily a letter for the DMV stating that she is capable of driving.  At her last visit, she requested a handicap parking permit and was discussing how she was bumping into things with her car due to difficulty with depth perception.  I suggested she not drive until she had her medications adjusted or submitted to a driving evaluation.  Patient states that she saw her Oncologist and had her dose of mitotane reduced along with her steroids, and this has improved  her depth perception and tremors.  She would like a letter submitted in this regard.  I also reviewed her last note from her eye doctor, at which her distance prescription was changed but no mention was made of impaired vision affecting her ability to drive.  Next, patient continues to report significant depression symptoms.  Her PHQ-9 score today is 22, with all symptoms prominent with the exception of appetite issues or suicidality.  To review her history, she has tried Zoloft, which she thinks worked well but made her fatigued.  She tried Paxil but this caused low libido.  She has trouble sleeping also, falls asleep well but wakes frequently during the night.      The following portions of the patient's history were reviewed and updated as appropriate: allergies, current medications, past medical history, past social history and problem list.      Current Outpatient Medications:      acetaminophen (TYLENOL) 325 MG tablet, Take 650 mg by mouth., Disp: , Rfl:      amino acids (DAILY AMINO ORAL), Take 325 mg by mouth daily., Disp: , Rfl:      buPROPion (WELLBUTRIN) 100 MG tablet, Take 200 mg in AM and 100 mg in PM, Disp: 270 tablet, Rfl: 3     calcium carbonate (CALCIUM ANTACID) 400 mg calcium (1,000 mg) Chew, Chew. Take 1 tablet once a day, Disp: , Rfl:      cholecalciferol, vitamin D3, 1,000 unit tablet, Take 2,000 Units by mouth daily., Disp: , Rfl:      clonazePAM (KLONOPIN) 0.5 MG tablet, Take 1 tablet (0.5 mg total) by mouth 2 (two) times a day as needed for anxiety. Due for an office visit prior to further refills (01/06/20), Disp: 60 tablet, Rfl: 0     EPINEPHrine (EPIPEN) 0.3 mg/0.3 mL (1:1,000) atIn, As directed, Disp: , Rfl:      fludrocortisone (FLORINEF) 0.1 mg tablet, Take 0.05 mg by mouth., Disp: , Rfl:      FLUoxetine (PROZAC) 40 MG capsule, Take 1 capsule (40 mg total) by mouth daily., Disp: 90 capsule, Rfl: 2     gabapentin (NEURONTIN) 300 MG capsule, Take 1 capsule (300 mg total) by mouth 3  "(three) times a day., Disp: 270 capsule, Rfl: 3     hydrocortisone (CORTEF) 10 MG tablet, Take 30 mg by mouth every morning. , Disp: , Rfl:      hydrocortisone (CORTEF) 20 MG tablet, Take 20 mg by mouth daily. In the afternoon, Disp: , Rfl:      iron,carbonyl-vitamin C (VITRON-C) 65 mg iron- 125 mg TbEC, Take 1 tablet by mouth., Disp: , Rfl:      levothyroxine (SYNTHROID, LEVOTHROID) 125 MCG tablet, Take 125 mcg by mouth., Disp: , Rfl:      mitotane (LYSODREN) 500 mg tablet, Take 2,000 mg by mouth 3 (three) times a day.    , Disp: , Rfl:      MULTIVIT,CALC,MINS/IRON/FOLIC (ONE-A-DAY WOMENS FORMULA ORAL), Take by mouth. Take 1 tablet once a day, Disp: , Rfl:      ondansetron (ZOFRAN) 4 MG tablet, Take 1 tablet (4 mg total) by mouth every 6 (six) hours as needed for nausea., Disp: 30 tablet, Rfl: 1     prochlorperazine (COMPAZINE) 10 MG tablet, TAKE ONE TABLET BY MOUTH EVERY 8 HOURS AS NEEDED FOR NAUSEA, Disp: 90 tablet, Rfl: 0     thiamine 50 MG tablet, Take 50 mg by mouth., Disp: , Rfl:      traZODone (DESYREL) 150 MG tablet, Take 1 tablet (150 mg total) by mouth at bedtime., Disp: 90 tablet, Rfl: 3     medical cannabis (Patient's own supply. Not a prescription), by Other route see administration instructions (This is NOT a prescription, and does not certify that the patient has a qualifying medical condition for medical cannabis.  The purpose of this order is  to document that the patient reports taking medical cannabis.) ., Disp: , Rfl:      promethazine (PHENERGAN) 12.5 MG tablet, Take 2 tablets (25 mg total) by mouth every 6 (six) hours as needed for nausea., Disp: 60 tablet, Rfl: 1    Review of Systems   Pertinent items are noted in HPI.      Objective:      /62 (Patient Site: Right Arm, Patient Position: Sitting, Cuff Size: Adult Regular)   Pulse 72   Resp 16   Ht 5' 4\" (1.626 m)   Wt 148 lb 1.6 oz (67.2 kg)   BMI 25.42 kg/m      General appearance: alert, appears stated age and " cooperative  Neurologic: coarse tremor bilateral hands, mild facial tremor  Psychiatric: depressed mood, mildly flat affect

## 2021-06-05 NOTE — PATIENT INSTRUCTIONS - HE
Come down on the Wellbutrin (bupropion) to 100mg twice daily.  Update me in 1 month if you haven't seen Psychiatry, and we may decrease further.

## 2021-06-05 NOTE — TELEPHONE ENCOUNTER
RN cannot approve Refill Request    RN can NOT refill this medication associated diagnosis needed. Last office visit: 1/13/2020 Gillain Garzon MD Last Physical: 11/6/2019 Last MTM visit: Visit date not found Last visit same specialty: 1/13/2020 Gillian Garzon MD.  Next visit within 3 mo: Visit date not found  Next physical within 3 mo: Visit date not found      Mala Vernon, [facility] Triage/Med Refill 2/7/2020    Requested Prescriptions   Pending Prescriptions Disp Refills     gabapentin (NEURONTIN) 300 MG capsule [Pharmacy Med Name: GABAPENTIN 300MG CAPS] 270 capsule 3     Sig: TAKE ONE CAPSULE BY MOUTH THREE TIMES A DAY       Gabapentin/Levetiracetam/Tiagabine Refill Protocol  Passed - 2/7/2020  9:15 AM        Passed - PCP or prescribing provider visit in past 12 months or next 3 months     Last office visit with prescriber/PCP: 1/13/2020 Gillian Garzon MD OR same dept: 1/13/2020 Gillian Garzon MD OR same specialty: 1/13/2020 Gillian Garzon MD  Last physical: 11/6/2019 Last MTM visit: Visit date not found   Next visit within 3 mo: Visit date not found  Next physical within 3 mo: Visit date not found  Prescriber OR PCP: Gillian Garzon MD  Last diagnosis associated with med order: There are no diagnoses linked to this encounter.  If protocol passes may refill for 12 months if within 3 months of last provider visit (or a total of 15 months).

## 2021-06-05 NOTE — TELEPHONE ENCOUNTER
RN cannot approve Refill Request    RN can NOT refill this medication med is not covered by policy/route to provider. Last office visit: 1/13/2020 Gillian Garzon MD Last Physical: 11/6/2019 Last MTM visit: Visit date not found Last visit same specialty: 1/13/2020 Gillian Garzon MD.  Next visit within 3 mo: Visit date not found  Next physical within 3 mo: Visit date not found      Aretha Baez, [facility] Triage/Med Refill 2/1/2020    Requested Prescriptions   Pending Prescriptions Disp Refills     prochlorperazine (COMPAZINE) 10 MG tablet [Pharmacy Med Name: PROCHLORPERAZINE MALEATE 10MG TABS] 90 tablet 0     Sig: TAKE ONE TABLET BY MOUTH EVERY 8 HOURS AS NEEDED FOR NAUSEA       There is no refill protocol information for this order

## 2021-06-06 NOTE — TELEPHONE ENCOUNTER
RN cannot approve Refill Request    RN can NOT refill this medication med is not covered by policy/route to provider. Last office visit: 1/13/2020 Gillian Garzon MD Last Physical: 11/6/2019 Last MTM visit: Visit date not found Last visit same specialty: 1/13/2020 Gillian Garzon MD.  Next visit within 3 mo: Visit date not found  Next physical within 3 mo: Visit date not found      Aretha Baez, [facility] Triage/Med Refill 3/1/2020    Requested Prescriptions   Pending Prescriptions Disp Refills     prochlorperazine (COMPAZINE) 10 MG tablet [Pharmacy Med Name: PROCHLORPERAZINE MALEATE 10MG TABS] 90 tablet 0     Sig: TAKE ONE TABLET BY MOUTH EVERY 8 HOURS AS NEEDED FOR NAUSEA       There is no refill protocol information for this order

## 2021-06-07 ENCOUNTER — ONCOLOGY VISIT (OUTPATIENT)
Dept: ONCOLOGY | Facility: CLINIC | Age: 36
End: 2021-06-07
Attending: INTERNAL MEDICINE
Payer: COMMERCIAL

## 2021-06-07 VITALS
HEART RATE: 86 BPM | BODY MASS INDEX: 25.99 KG/M2 | TEMPERATURE: 98.1 F | SYSTOLIC BLOOD PRESSURE: 125 MMHG | RESPIRATION RATE: 19 BRPM | OXYGEN SATURATION: 98 % | WEIGHT: 151.4 LBS | DIASTOLIC BLOOD PRESSURE: 85 MMHG

## 2021-06-07 DIAGNOSIS — F51.01 PRIMARY INSOMNIA: ICD-10-CM

## 2021-06-07 DIAGNOSIS — C74.01 MALIGNANT NEOPLASM OF CORTEX OF RIGHT ADRENAL GLAND (H): ICD-10-CM

## 2021-06-07 DIAGNOSIS — E27.40 ADRENAL INSUFFICIENCY (H): ICD-10-CM

## 2021-06-07 DIAGNOSIS — C74.01 ADRENAL CORTEX CANCER, RIGHT (H): ICD-10-CM

## 2021-06-07 LAB — ACTH PLAS-MCNC: 14 PG/ML

## 2021-06-07 PROCEDURE — G0463 HOSPITAL OUTPT CLINIC VISIT: HCPCS

## 2021-06-07 PROCEDURE — 82530 CORTISOL FREE: CPT | Mod: 90 | Performed by: INTERNAL MEDICINE

## 2021-06-07 PROCEDURE — 99000 SPECIMEN HANDLING OFFICE-LAB: CPT | Performed by: INTERNAL MEDICINE

## 2021-06-07 PROCEDURE — 99214 OFFICE O/P EST MOD 30 MIN: CPT | Performed by: INTERNAL MEDICINE

## 2021-06-07 PROCEDURE — 82542 COL CHROMOTOGRAPHY QUAL/QUAN: CPT | Mod: 90 | Performed by: INTERNAL MEDICINE

## 2021-06-07 RX ORDER — FLUDROCORTISONE ACETATE 0.1 MG/1
0.2 TABLET ORAL DAILY
Qty: 180 TABLET | Refills: 3 | Status: SHIPPED | OUTPATIENT
Start: 2021-06-07 | End: 2021-10-25

## 2021-06-07 RX ORDER — HYDROCORTISONE 10 MG/1
TABLET ORAL
Qty: 550 TABLET | Refills: 3 | Status: SHIPPED | OUTPATIENT
Start: 2021-06-07 | End: 2022-02-04

## 2021-06-07 ASSESSMENT — PAIN SCALES - GENERAL: PAINLEVEL: NO PAIN (0)

## 2021-06-07 NOTE — PROGRESS NOTES
"MEDICAL ONCOLOGY CLINIC NOTE    REFERRING PROVIDER: Warren Mansfield MD from [facility] Urologic Oncology clinic.      REASON FOR CURRENT VISIT: Follow-up for adrenocortical carcinoma, currently on adjuvant mitotane    Transfer of care from Dr. Cintron to Dr. Shaw    ONCOLOGIC HISTORY:  1. 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   - CT abdomen and pelvis stone protocol without contrast 5 8018 showed \"9.2 x 8 cm heterogeneous right upper quadrant mass with small foci of calcification within it which is likely arising from the adrenal gland though inseparable from liver and upper pole of right kidney. It is incompletely evaluated on this noncontrast study. 3 x 2.6 cm mass right lobe of liver on image #85 also incompletely evaluated. 6 x 4 x 4 mm upper third left ureteral obstructing stone with mild to moderate secondary hydronephrosis. Collection of stones at lower pole left kidney extending over an area of approximately 6 x 7 x 4 mm. Additional nonobstructing 1 mm stone upper pole left kidney. 2 mm nonobstructing stone noted upper pole right kidney with no hydronephrosis on the right. Postop change consistent with gastric bypass. Noncontrast images of spleen, left adrenal gland, gallbladder, and pancreas unremarkable. No aneurysm. No adenopathy.\"  - Seen by Dr. Delvalle at [facility] Urology clinic. Referred to Dr. Alvino Patel and then Dr. Warren Mansfield.  - Endocrinology team directed workup showed high DHEAS level of 740 pre-surgery.   - Right open adrenalectomy and hepatic mobilization performed by Dr. Warren Mansfield on 6/25/2018. Pathology showed adrenocortical carcinoma measuring 11.3 cm, weighing 413 g with extension into or through the adrenal capsule negative lymphovascular invasion, tumor necrosis present, margins involved " by tumor, no regional lymph nodes identified, pathologic stage T2 NX.  pathology review reveals low grade ACC.  - DHEAS normalized postsurgery.   - Adjuvant Mitotane started on 7/24/18. Dose increased to 2000mg TID around 10/23/18 due to persistently low troughs. Held 1/16/19 for two weeks and resumed 1/30 at 1000 mg po BID due to very poor tolerance of higher doses. Highest mitotane level was 10.2 on 2/11/19. Mitotane troughs did not rise above 10 despite increase in dose to 1500 BID and then 1500+2000. Started 2000mg BID on 10/28/20. Increased to [phone] since around Christmas 2020.   - Saw radiation oncology at [facility], radiation oncology at [facility], as well as endocrine oncology (Dr. Huertas) at [facility].   - S/p adjuvant RT by Dr. Monsivais - 5040 cGy over 30 fr (8/24/18-10/6/18).  - 2/11/19: OSH CT C/A/P and MRI brain with contrast - no evidence of disease recurrence.   - 06/08/20, 09/14/20, 12/7/20, 3/16/2021: CT C/A/P with contrast - AFUA.    INTERVAL HISTORY:  Ms. Rodríguez is a 35-year-old lady with history notable for right-sided functioning adrenocortical carcinoma (diagnosed in May 2018), who was  on adjuvant mitotane. Her mitotane [phone] since around Christmas 2020.  She was under care of my colleague - Dr. Cintron and is transferring care as Dr. Cintron is leaving.   She stopped the mitotane on 5/4/21 after discussion with Dr. Hilton, endocrinologic oncologist from the [facility], she was routinely following.  After stopping mitotane she endorses significant improvement in her symptoms, though states she still have some tremors and problems with sleeping.  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  She is excited to start working, though endorses that is less energy and time to spend with her kids.  Her weight is stable, blood pressures are normal.   Continues hydrocortisone 25mg QAM, 15mg  "early PM; fluodrocortisone 0.1mg/day and levothyroxine to 125 mcg/day and then 2 tablets on . She also follows with Dr. Veena Jaquez in our endocrinology division.     She reports being very \"jittery\" and unable to sleep and feeling uneasy.  She also has concerns that she has ADHD.  She was told in the past that she should be evaluated for this.    REVIEW OF SYSTEMS: 14 point ROS negative other than the symptoms noted above in the HPI.    PAST MEDICAL HISTORY:  Past Medical History:   Diagnosis Date     Adrenal cortex cancer, right (H) 2018    Resected 18, Dr. Mansfield.     Adrenal mass (H)      Chocolate cyst of ovary      History of miscarriage      Malignant neoplasm of cortex of right adrenal gland (H) 2018    EOTD; 19.  Check in May. SCP started.  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) 2018   Cushing's syndrome, secondary to #1 (300 mcg/24 hr); Rx hydrocortisone 20 + 10 post op   Post operative defect right hemidiaphragm with ?worsening right effussion, not present preop   Currently on mitotane, 500 mg, BID x 1 week + hydrocortisone     MTHFR mutation (H)    MHTFR mutation with h/o mutiple miscarriages.    PAST SURGICAL HISTORY:   Past Surgical History:   Procedure Laterality Date     ADRENALECTOMY Right 2018    Procedure: ADRENALECTOMY;  Right Adrenalectomy,  Embolize Liver , Anesthesia Block;  Surgeon: Warren Mansfield MD;  Location: [facility] OR     ADRENALECTOMY        SECTION      twice      SECTION      2     EMBOLECTOMY ABDOMEN N/A 2018    Procedure: EMBOLECTOMY ABDOMEN;;  Surgeon: Ector Mcintyre MD;  Location: [facility] OR     EXTRACORPOREAL SHOCK WAVE LITHOTRIPSY (ESWL)       GASTRIC BYPASS        SOCIAL HISTORY:   Social History     Tobacco Use     Smoking status: Never Smoker     Smokeless tobacco: Never Used   Substance Use Topics     Alcohol use: Yes     Comment: occ. "     Drug use: No     FAMILY HISTORY:   Family History   Problem Relation Age of Onset     Depression Paternal Grandmother      ALLERGIES:   Allergies   Allergen Reactions     Bee Venom Swelling     Ibuprofen Hives and Swelling     CURRENT MEDICATIONS:   Current Outpatient Medications   Medication Sig     acetaminophen (TYLENOL) 325 MG tablet Take 2 tablets (650 mg) by mouth every 4 hours as needed for other (multimodal surgical pain management along with NSAIDS and opioid medication as indicated based on pain control and physical function.)     buPROPion (WELLBUTRIN SR) 150 MG 12 hr tablet Take 150 mg by mouth     calcium carbonate 500 mg, elemental, (OSCAL) 500 MG tablet Take 1 tablet (500 mg) by mouth 2 times daily     clonazePAM (KLONOPIN) 0.5 MG tablet Take 1 tablet (0.5 mg) by mouth 2 times daily as needed for anxiety     diazepam (VALIUM) 5 MG tablet Take 1 tablet (5 mg) by mouth once as needed for anxiety (MRI claustrophobia management)     fludrocortisone (FLORINEF) 0.1 MG tablet Take 2 tablets (0.2 mg) by mouth daily     FLUoxetine (PROZAC) 20 MG capsule Take 3 capsules (60 mg) by mouth daily     gabapentin (NEURONTIN) 300 MG capsule Take 3 capsules (900 mg) by mouth 3 times daily     hydrocortisone (CORTEF) 10 MG tablet Take 3 tablets (30 MG) every morning and take 2 tablets (20 MG) by mouth every day at 2 PM. Additional as directed.     levothyroxine (SYNTHROID/LEVOTHROID) 125 MCG tablet Take 1 tablet (125 mcg) by mouth daily (Patient taking differently: Take 125 mcg by mouth daily On Sunday's - 250 mg)     ondansetron (ZOFRAN) 8 MG tablet Take 1 tablet (8 mg) by mouth every 8 hours as needed for nausea     prochlorperazine (COMPAZINE) 5 MG tablet Take 1 tablet (5 mg) by mouth every 8 hours as needed for nausea or vomiting     temazepam (RESTORIL) 15 MG capsule Take 1 capsule (15 mg) by mouth nightly as needed for sleep     UNABLE TO FIND medical cannabis (Patient's own supply. Not a prescription)     No  current facility-administered medications for this visit.         PHYSICAL EXAMINATION:  Vital signs: /85   Pulse 86   Temp 98.1  F (36.7  C) (Tympanic)   Resp 19   Wt 68.7 kg (151 lb 6.4 oz)   SpO2 98%   BMI 25.99 kg/m     Gen: NAD, sitting in the chair  HEENT: Moist oral mucosa, supple neck, no LAD  Cards: RRR, no RMG, S1/S2 normal  Pulm: CTABL, no wheezing or rhonchi  Abdo: Soft, nontender, nondistended, positive bowel sounds, no hepatosplenomegaly  Neuro: No gross neurologic deficits are noted  Extr: No edema bilaterally, preserved range of motion  Psych: Appropriate behavior    LABORATORY DATA:   Results for VALERY PANDYA (MRN [id number]) as of 6/7/2021 09:08   Ref. Range 6/4/2021 12:05   Sodium Latest Ref Range: 133 - 144 mmol/L 138   Potassium Latest Ref Range: 3.4 - 5.3 mmol/L 3.9   Chloride Latest Ref Range: 94 - 109 mmol/L 105   Carbon Dioxide Latest Ref Range: 20 - 32 mmol/L 27   Urea Nitrogen Latest Ref Range: 7 - 30 mg/dL 11   Creatinine Latest Ref Range: 0.52 - 1.04 mg/dL 0.68   GFR Estimate Latest Ref Range: >60 mL/min/1.73_m2 >90   GFR Estimate If Black Latest Ref Range: >60 mL/min/1.73_m2 >90   Calcium Latest Ref Range: 8.5 - 10.1 mg/dL 7.9 (L)   Anion Gap Latest Ref Range: 3 - 14 mmol/L 6   Albumin Latest Ref Range: 3.4 - 5.0 g/dL 2.8 (L)   Protein Total Latest Ref Range: 6.8 - 8.8 g/dL 5.6 (L)   Bilirubin Total Latest Ref Range: 0.2 - 1.3 mg/dL 0.2   Alkaline Phosphatase Latest Ref Range: 40 - 150 U/L 77   ALT Latest Ref Range: 0 - 50 U/L 17   AST Latest Ref Range: 0 - 45 U/L 15   Cholesterol Latest Ref Range: <200 mg/dL 157   HDL Cholesterol Latest Ref Range: >49 mg/dL 103   LDL Cholesterol Calculated Latest Ref Range: <100 mg/dL 31   Non HDL Cholesterol Latest Ref Range: <130 mg/dL 54   T4 Free Latest Ref Range: 0.76 - 1.46 ng/dL 0.93   Triglycerides Latest Ref Range: <150 mg/dL 116   TSH Latest Ref Range: 0.40 - 4.00 mU/L 0.44               DHEAS was 740 on 6/5/18, and has  been <15 on 7/12/18, 8/20/18, 9/19/18, 10/23/18, 11/27/18, 1/9/19, 5/8/19, 6/18/19, 7/17/19, 11/15/19, 12/12/19, 1/22/20, 3/30/20 and 6/8/20, 7/21/20, 8/5/20, 1/20/21, 5/10/21.  Mitotane level (target 14-20): 1.8ng/dl on 8/20/18, 3.5 on 10/25/18, 6.2 on 12/4/18, 8.1 on 1/9/19 and 10.2 on 2/11/19, 14.2 on 6/18/19. 10.8 on 12/12/19.  10 on 1/24/20. 8.9 on 5/14/20. 8.6 on 7/21/20, 8.3 on 8/5/20, 14.0 in 1/2021, 10.5 on 5/10/21.  Labs from [facility] 9/24/19 - WBC 4200, ANC 2600, Hb 10.4, Platelets 218K, lytes WNL, creat 0.61, Calcium 8, albumin 3.5, LFTs normal, mitotane level 18.8, cortisol 25.8, ACTH<5, aldosterone 4.9, free T4 1.17, TSH 0.07, DHEAS <15, renin plasma 10.6.     IMAGING DATA:  6/4/21                                                                   IMPRESSION:  1. Stable postoperative changes of right adrenalectomy. No evidence  for local recurrence. No convincing evidence for metastatic disease in  the chest, abdomen, pelvis and bones.  2. No significant interval change in decreased cortical parenchyma  enhancement about the superior pole of the right kidney with  associated cortical retraction likely related to prior  infarction/radiation. Attention on follow-up studies.  3. Additional incidental findings as described above.       ASSESSMENT AND PLAN: A 35-year-old lady with right-sided functioning high-risk adrenocortical carcinoma.     Adrenocortical carcinoma:  - Started on adjuvant mitotane in July 2018 based on her presentation and tumor characteristics including invasion of the adrenal capsule, high mitotic rate, possibly positive margins, and high Ki67, which could result in a rate of recurrence as high as 40%.    The mitotane was stopped by Dr. Hilton and endocrinologic oncologist from [facility].  She had a level drawn on 5/10/2021, 6 days after discontinuation of mitotane with level of 10.5.  She informs that Dr. Liang would like to continue with mitotane levels every month 5:45 PM  and interval imaging every 3 months from now .  Thus we will continue to monitor every 4 week  mitotane level, CBC, comprehensive panel, TSH, FT4, DHEAS, ACTH, periodic cholesterol panel, and 24-hour Urine Free Cortisol.   We will plan with monthly follow-up with midlevel providers during this period of time, CT chest abdomen pelvis in 1 month (the last scan was done 2 months ago) and another scan in 3 months, followed by appointment with Dr. Shaw in 4 months from now.  She is on hydrocortisone 25/15 and that was monitored from [facility].  Taking in account that she was on mitotane for last 3 years the taper probably will be prolonged.. Given her symptoms I have made the decision that we can decrease the hydrocortisone to 20/10.  She will do this immediately and will monitor for any changes.  I assured her that she is unlikely to go into an adrenal crisis with a modest dose reduction such as this and that 20/10 is approximately a physiologic dose.  As the mitotane levels drop she will most likely be able to go down to a more physiologic dosing regimen.  She will also check in with her team at the [facility].      I renewed the Florinef today.  I will see her back in 1 month time to reevaluate the hormone levels and her symptoms.  We will check ACTH and DHEA-S at the next visit  I recommended waiting for a bit before starting ADHD therapy.  Given that we are titrating down some of her endocrine test we may be able to calm some of these symptoms prior to adding other therapies.      Amando Shaw MD

## 2021-06-07 NOTE — LETTER
"    6/7/2021         RE: Suzy Rodríguez  [address]  [facility] [address]        Dear Colleague,    Thank you for referring your patient, Suzy Rodríguez, to the [facility]. Please see a copy of my visit note below.    MEDICAL ONCOLOGY CLINIC NOTE    REFERRING PROVIDER: Warren Mansfield MD from [facility] On[facility] clinic.      REASON FOR CURRENT VISIT: Follow-up for adrenocortical carcinoma, currently on adjuvant mitotane    Transfer of care from Dr. Cintron to Dr. Shaw    ONCOLOGIC HISTORY:  1. 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   - CT abdomen and pelvis stone protocol without contrast 5 2606 showed \"9.2 x 8 cm heterogeneous right upper quadrant mass with small foci of calcification within it which is likely arising from the adrenal gland though inseparable from liver and upper pole of right kidney. It is incompletely evaluated on this noncontrast study. 3 x 2.6 cm mass right lobe of liver on image #85 also incompletely evaluated. 6 x 4 x 4 mm upper third left ureteral obstructing stone with mild to moderate secondary hydronephrosis. Collection of stones at lower pole left kidney extending over an area of approximately 6 x 7 x 4 mm. Additional nonobstructing 1 mm stone upper pole left kidney. 2 mm nonobstructing stone noted upper pole right kidney with no hydronephrosis on the right. Postop change consistent with gastric bypass. Noncontrast images of spleen, left adrenal gland, gallbladder, and pancreas unremarkable. No aneurysm. No adenopathy.\"  - Seen by Dr. Delvalle at [facility] Urology clinic. Referred to Dr. Alvino Patel and then Dr. Warren Mansfield.  - Endocrinology team directed workup showed high DHEAS level of 740 pre-surgery.   - Right open adrenalectomy and hepatic " mobilization performed by Dr. Kelley Weight on 6/25/2018. Pathology showed adrenocortical carcinoma measuring 11.3 cm, weighing 413 g with extension into or through the adrenal capsule negative lymphovascular invasion, tumor necrosis present, margins involved by tumor, no regional lymph nodes identified, pathologic stage T2 NX.  pathology review reveals low grade ACC.  - DHEAS normalized postsurgery.   - Adjuvant Mitotane started on 7/24/18. Dose increased to 2000mg TID around 10/23/18 due to persistently low troughs. Held 1/16/19 for two weeks and resumed 1/30 at 1000 mg po BID due to very poor tolerance of higher doses. Highest mitotane level was 10.2 on 2/11/19. Mitotane troughs did not rise above 10 despite increase in dose to 1500 BID and then 1500+2000. Started 2000mg BID on 10/28/20. Increased to [id number] since around Christmas 2020.   - Saw radiation oncology at [facility], radiation oncology at [facility], as well as endocrine oncology (Dr. Huertas) at [facility].   - S/p adjuvant RT by Dr. Monsivais - 5040 cGy over 30 fr (8/24/18-10/6/18).  - 2/11/19: OSH CT C/A/P and MRI brain with contrast - no evidence of disease recurrence.   - 06/08/20, 09/14/20, 12/7/20, 3/16/2021: CT C/A/P with contrast - AFUA.    INTERVAL HISTORY:  Ms. Rodríguez is a 35-year-old lady with history notable for right-sided functioning adrenocortical carcinoma (diagnosed in May 2018), who was  on adjuvant mitotane. Her mitotane [id number] since around Christmas 2020.  She was under care of my colleague - Dr. Cintron and is transferring care as Dr. Cintron is leaving.   She stopped the mitotane on 5/4/21 after discussion with Dr. Hilton, endocrinologic oncologist from the [facility], she was routinely following.  After stopping mitotane she endorses significant improvement in her symptoms, though states she still have some tremors and problems with sleeping.  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  She is excited to start working, though endorses that is less energy and time to spend with her kids.  Her weight is stable, blood pressures are normal.   Continues hydrocortisone 25mg QAM, 15mg early PM; fluodrocortisone 0.1mg/day and levothyroxine to 125 mcg/day and then 2 tablets on . She also follows with Dr. Veena Jaquez in our endocrinology division.     She reports being very \"jittery\" and unable to sleep and feeling uneasy.  She also has concerns that she has ADHD.  She was told in the past that she should be evaluated for this.    REVIEW OF SYSTEMS: 14 point ROS negative other than the symptoms noted above in the HPI.    PAST MEDICAL HISTORY:  Past Medical History:   Diagnosis Date     Adrenal cortex cancer, right (H) 2018    Resected 18, Dr. Mansfield.     Adrenal mass (H)      Chocolate cyst of ovary      History of miscarriage      Malignant neoplasm of cortex of right adrenal gland (H) 2018    EOTD; 19.  Check in May. SCP started.  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) 2018   Cushing's syndrome, secondary to #1 (300 mcg/24 hr); Rx hydrocortisone 20 + 10 post op   Post operative defect right hemidiaphragm with ?worsening right effussion, not present preop   Currently on mitotane, 500 mg, BID x 1 week + hydrocortisone     MTHFR mutation (H)    MHTFR mutation with h/o mutiple miscarriages.    PAST SURGICAL HISTORY:   Past Surgical History:   Procedure Laterality Date     ADRENALECTOMY Right 2018    Procedure: ADRENALECTOMY;  Right Adrenalectomy,  Embolize Liver , Anesthesia Block;  Surgeon: Warren Mansfield MD;  Location: [facility] OR     ADRENALECTOMY        SECTION      twice      SECTION      2     EMBOLECTOMY ABDOMEN N/A 2018    Procedure: EMBOLECTOMY ABDOMEN;;  Surgeon: Ector Mcintyre MD;  Location: " [facility] OR     EXTRACORPOREAL SHOCK WAVE LITHOTRIPSY (ESWL)       GASTRIC BYPASS  2016      SOCIAL HISTORY:   Social History     Tobacco Use     Smoking status: Never Smoker     Smokeless tobacco: Never Used   Substance Use Topics     Alcohol use: Yes     Comment: occ.     Drug use: No     FAMILY HISTORY:   Family History   Problem Relation Age of Onset     Depression Paternal Grandmother      ALLERGIES:   Allergies   Allergen Reactions     Bee Venom Swelling     Ibuprofen Hives and Swelling     CURRENT MEDICATIONS:   Current Outpatient Medications   Medication Sig     acetaminophen (TYLENOL) 325 MG tablet Take 2 tablets (650 mg) by mouth every 4 hours as needed for other (multimodal surgical pain management along with NSAIDS and opioid medication as indicated based on pain control and physical function.)     buPROPion (WELLBUTRIN SR) 150 MG 12 hr tablet Take 150 mg by mouth     calcium carbonate 500 mg, elemental, (OSCAL) 500 MG tablet Take 1 tablet (500 mg) by mouth 2 times daily     clonazePAM (KLONOPIN) 0.5 MG tablet Take 1 tablet (0.5 mg) by mouth 2 times daily as needed for anxiety     diazepam (VALIUM) 5 MG tablet Take 1 tablet (5 mg) by mouth once as needed for anxiety (MRI claustrophobia management)     fludrocortisone (FLORINEF) 0.1 MG tablet Take 2 tablets (0.2 mg) by mouth daily     FLUoxetine (PROZAC) 20 MG capsule Take 3 capsules (60 mg) by mouth daily     gabapentin (NEURONTIN) 300 MG capsule Take 3 capsules (900 mg) by mouth 3 times daily     hydrocortisone (CORTEF) 10 MG tablet Take 3 tablets (30 MG) every morning and take 2 tablets (20 MG) by mouth every day at 2 PM. Additional as directed.     levothyroxine (SYNTHROID/LEVOTHROID) 125 MCG tablet Take 1 tablet (125 mcg) by mouth daily (Patient taking differently: Take 125 mcg by mouth daily On Sunday's - 250 mg)     ondansetron (ZOFRAN) 8 MG tablet Take 1 tablet (8 mg) by mouth every 8 hours as needed for nausea     prochlorperazine (COMPAZINE) 5 MG  tablet Take 1 tablet (5 mg) by mouth every 8 hours as needed for nausea or vomiting     temazepam (RESTORIL) 15 MG capsule Take 1 capsule (15 mg) by mouth nightly as needed for sleep     UNABLE TO FIND medical cannabis (Patient's own supply. Not a prescription)     No current facility-administered medications for this visit.         PHYSICAL EXAMINATION:  Vital signs: /85   Pulse 86   Temp 98.1  F (36.7  C) (Tympanic)   Resp 19   Wt 68.7 kg (151 lb 6.4 oz)   SpO2 98%   BMI 25.99 kg/m     Gen: NAD, sitting in the chair  HEENT: Moist oral mucosa, supple neck, no LAD  Cards: RRR, no RMG, S1/S2 normal  Pulm: CTABL, no wheezing or rhonchi  Abdo: Soft, nontender, nondistended, positive bowel sounds, no hepatosplenomegaly  Neuro: No gross neurologic deficits are noted  Extr: No edema bilaterally, preserved range of motion  Psych: Appropriate behavior    LABORATORY DATA:   Results for VALERY PANDYA (MRN [id number]) as of 6/7/2021 09:08   Ref. Range 6/4/2021 12:05   Sodium Latest Ref Range: 133 - 144 mmol/L 138   Potassium Latest Ref Range: 3.4 - 5.3 mmol/L 3.9   Chloride Latest Ref Range: 94 - 109 mmol/L 105   Carbon Dioxide Latest Ref Range: 20 - 32 mmol/L 27   Urea Nitrogen Latest Ref Range: 7 - 30 mg/dL 11   Creatinine Latest Ref Range: 0.52 - 1.04 mg/dL 0.68   GFR Estimate Latest Ref Range: >60 mL/min/1.73_m2 >90   GFR Estimate If Black Latest Ref Range: >60 mL/min/1.73_m2 >90   Calcium Latest Ref Range: 8.5 - 10.1 mg/dL 7.9 (L)   Anion Gap Latest Ref Range: 3 - 14 mmol/L 6   Albumin Latest Ref Range: 3.4 - 5.0 g/dL 2.8 (L)   Protein Total Latest Ref Range: 6.8 - 8.8 g/dL 5.6 (L)   Bilirubin Total Latest Ref Range: 0.2 - 1.3 mg/dL 0.2   Alkaline Phosphatase Latest Ref Range: 40 - 150 U/L 77   ALT Latest Ref Range: 0 - 50 U/L 17   AST Latest Ref Range: 0 - 45 U/L 15   Cholesterol Latest Ref Range: <200 mg/dL 157   HDL Cholesterol Latest Ref Range: >49 mg/dL 103   LDL Cholesterol Calculated Latest Ref  Range: <100 mg/dL 31   Non HDL Cholesterol Latest Ref Range: <130 mg/dL 54   T4 Free Latest Ref Range: 0.76 - 1.46 ng/dL 0.93   Triglycerides Latest Ref Range: <150 mg/dL 116   TSH Latest Ref Range: 0.40 - 4.00 mU/L 0.44               DHEAS was 740 on 6/5/18, and has been <15 on 7/12/18, 8/20/18, 9/19/18, 10/23/18, 11/27/18, 1/9/19, 5/8/19, 6/18/19, 7/17/19, 11/15/19, 12/12/19, 1/22/20, 3/30/20 and 6/8/20, 7/21/20, 8/5/20, 1/20/21, 5/10/21.  Mitotane level (target 14-20): 1.8ng/dl on 8/20/18, 3.5 on 10/25/18, 6.2 on 12/4/18, 8.1 on 1/9/19 and 10.2 on 2/11/19, 14.2 on 6/18/19. 10.8 on 12/12/19.  10 on 1/24/20. 8.9 on 5/14/20. 8.6 on 7/21/20, 8.3 on 8/5/20, 14.0 in 1/2021, 10.5 on 5/10/21.  Labs from [facility] 9/24/19 - WBC 4200, ANC 2600, Hb 10.4, Platelets 218K, lytes WNL, creat 0.61, Calcium 8, albumin 3.5, LFTs normal, mitotane level 18.8, cortisol 25.8, ACTH<5, aldosterone 4.9, free T4 1.17, TSH 0.07, DHEAS <15, renin plasma 10.6.     IMAGING DATA:  6/4/21                                                                   IMPRESSION:  1. Stable postoperative changes of right adrenalectomy. No evidence  for local recurrence. No convincing evidence for metastatic disease in  the chest, abdomen, pelvis and bones.  2. No significant interval change in decreased cortical parenchyma  enhancement about the superior pole of the right kidney with  associated cortical retraction likely related to prior  infarction/radiation. Attention on follow-up studies.  3. Additional incidental findings as described above.       ASSESSMENT AND PLAN: A 35-year-old lady with right-sided functioning high-risk adrenocortical carcinoma.     Adrenocortical carcinoma:  - Started on adjuvant mitotane in July 2018 based on her presentation and tumor characteristics including invasion of the adrenal capsule, high mitotic rate, possibly positive margins, and high Ki67, which could result in a rate of recurrence as high as 40%.    The mitotane was  stopped by Dr. Hilton and endocrinologic oncologist from [facility].  She had a level drawn on 5/10/2021, 6 days after discontinuation of mitotane with level of 10.5.  She informs that Dr. Linag would like to continue with mitotane levels every month 5:45 PM and interval imaging every 3 months from now .  Thus we will continue to monitor every 4 week  mitotane level, CBC, comprehensive panel, TSH, FT4, DHEAS, ACTH, periodic cholesterol panel, and 24-hour Urine Free Cortisol.   We will plan with monthly follow-up with midlevel providers during this period of time, CT chest abdomen pelvis in 1 month (the last scan was done 2 months ago) and another scan in 3 months, followed by appointment with Dr. Shaw in 4 months from now.  She is on hydrocortisone 25/15 and that was monitored from [facility].  Taking in account that she was on mitotane for last 3 years the taper probably will be prolonged.. Given her symptoms I have made the decision that we can decrease the hydrocortisone to 20/10.  She will do this immediately and will monitor for any changes.  I assured her that she is unlikely to go into an adrenal crisis with a modest dose reduction such as this and that 20/10 is approximately a physiologic dose.  As the mitotane levels drop she will most likely be able to go down to a more physiologic dosing regimen.  She will also check in with her team at the [facility].      I renewed the Florinef today.  I will see her back in 1 month time to reevaluate the hormone levels and her symptoms.  We will check ACTH and DHEA-S at the next visit  I recommended waiting for a bit before starting ADHD therapy.  Given that we are titrating down some of her endocrine test we may be able to calm some of these symptoms prior to adding other therapies.      Amando Shaw MD          Again, thank you for allowing me to participate in the care of your patient.        Sincerely,        Amando FERGUSON  MD Colin

## 2021-06-07 NOTE — PROGRESS NOTES
This video/telephone visit will be conducted via a call between you and your physician/provider. We have found that certain health care needs can be provided without the need for an in-person physical exam. This service lets us provide the care you need with a video /telephone conversation. If a prescription is necessary we can send it directly to your pharmacy. If lab work is needed we can place an order for that and you can then stop by our lab to have the test done at a later time.    Just as we bill insurance for in-person visits, we also bill insurance for video/telephone visits. If you have questions about your insurance coverage, we recommend that you speak with your insurance company.    Patient has given verbal consent for video? yes  Patient would like the video visit invitation sent by: Text to cell phone: [phone]  Patient verified allergies, medications and pharmacy via phone. Patient states she is ready for visit.    Darling Montelongo, CMA

## 2021-06-07 NOTE — TELEPHONE ENCOUNTER
RN cannot approve Refill Request    RN can NOT refill this medication med is not covered by policy/route to provider. Last office visit: 1/13/2020 Gillian Garzon MD Last Physical: 11/6/2019 Last MTM visit: Visit date not found Last visit same specialty: 1/13/2020 Gillian Garzon MD.  Next visit within 3 mo: Visit date not found  Next physical within 3 mo: Visit date not found      Jessica Post, [facility] Triage/Med Refill 4/2/2020    Requested Prescriptions   Pending Prescriptions Disp Refills     prochlorperazine (COMPAZINE) 10 MG tablet [Pharmacy Med Name: PROCHLORPERAZINE MALEATE 10MG TABS] 90 tablet 0     Sig: TAKE ONE TABLET BY MOUTH EVERY 8 HOURS AS NEEDED FOR NAUSEA       There is no refill protocol information for this order

## 2021-06-07 NOTE — PROGRESS NOTES
________________________________________  Medications Phoned  to Pharmacy [] yes [x]no  Name of Pharmacist:  List Medications, including dose, quantity and instructions    Medications ordered this visit were e-scribed.  Verified by order class [x] yes  [] no    Medication changes or discontinuations were communicated to patient's pharmacy: [] yes  [x] no    Dictation completed at time of chart check: [] yes  [x] no    I have checked the documentation for today s encounters and the above information has been reviewed and completed.

## 2021-06-07 NOTE — PROGRESS NOTES
"Suzy Rodríguez is a 34 y.o. female who is being evaluated via a billable telephone visit.      The patient has been notified of following:     \"This telephone visit will be conducted via a call between you and your physician/provider. We have found that certain health care needs can be provided without the need for a physical exam.  This service lets us provide the care you need with a short phone conversation.  If a prescription is necessary we can send it directly to your pharmacy.  If lab work is needed we can place an order for that and you can then stop by our lab to have the test done at a later time.    Telephone visits are billed at different rates depending on your insurance coverage. During this emergency period, for some insurers they may be billed the same as an in-person visit.  Please reach out to your insurance provider with any questions.    If during the course of the call the physician/provider feels a telephone visit is not appropriate, you will not be charged for this service.\"    Patient has given verbal consent to a Telephone visit? Yes    Patient would like to receive their AVS by AVS Preference: Shorty.    Additional provider notes: Suzy is a 34-year-old woman who normally sees Dr. Gillian Garzon.  I am doing a telephone encounter with her today.  She is requesting a refill of her clonazepam.  Review the chart shows that they had talked about tapering down off this medication.  Patient states that she is just been more anxious lately with everything that is been going on.  She states that she spent some time in [place] over the winter where you can buy clonazepam over-the-counter.  She states that she was taking 2 mg daily of the clonazepam when she was in [place].  She states now she is just taking it 2 or 3 times per week.  It looks like there was a referral to psychiatry placed in January, but patient has not been able to establish with a psychiatrist at this point.  There is also " mention in the chart of possibly overuse of benzodiazepines.  I discussed with the patient that I will do a short refill, but she needs to connect with her primary care physician sometime in the next 3 weeks to form a plan going forward.    Assessment/Plan:  1. Anxiety  Patient is requesting a refill of clonazepam today.  I discussed with her that I would do a short refill until she contact her primary care physician.  She should reach out sometime in the next 3 weeks.  She knows that she should only be getting refills from one provider as this is a controlled substance.  She needs to form a long-term plan going forward with her physician.  She was told this will not be refilled by anyone other than Dr.Maggi Garzon.  - clonazePAM (KLONOPIN) 0.5 MG tablet; Take 1 tablet (0.5 mg total) by mouth 2 (two) times a day as needed for anxiety.  Dispense: 30 tablet; Refill: 0        Phone call duration:  8 minutes    Faby Rae MD

## 2021-06-07 NOTE — PATIENT INSTRUCTIONS
1. Decrease Hydrocortisone to 20/10  2. Order DHEAS  3. Await Mitotane results  - and recheck in a month.

## 2021-06-07 NOTE — TELEPHONE ENCOUNTER
RN cannot approve Refill Request    RN can NOT refill this medication med is not covered by policy/route to provider. Last office visit: 1/13/2020 Gillian Garzon MD Last Physical: 11/6/2019 Last MTM visit: Visit date not found Last visit same specialty: 1/13/2020 Gillian Garzon MD.  Next visit within 3 mo: Visit date not found  Next physical within 3 mo: Visit date not found      Aretha Baez, [facility] Triage/Med Refill 3/29/2020    Requested Prescriptions   Pending Prescriptions Disp Refills     prochlorperazine (COMPAZINE) 10 MG tablet [Pharmacy Med Name: PROCHLORPERAZINE MALEATE 10MG TABS] 90 tablet 0     Sig: TAKE ONE TABLET BY MOUTH EVERY 8 HOURS AS NEEDED FOR NAUSEA       There is no refill protocol information for this order
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
## 2021-06-07 NOTE — NURSING NOTE
"Oncology Rooming Note    June 7, 2021 8:39 AM   Suzy Rodríguez is a 35 year old female who presents for:    Chief Complaint   Patient presents with     Oncology Clinic Visit     CARCINOMA, ADRENAL CORTICAL     Initial Vitals: /85   Pulse 86   Temp 98.1  F (36.7  C) (Tympanic)   Resp 19   Wt 68.7 kg (151 lb 6.4 oz)   SpO2 98%   BMI 25.99 kg/m   Estimated body mass index is 25.99 kg/m  as calculated from the following:    Height as of 2/24/21: 1.626 m (5' 4\").    Weight as of this encounter: 68.7 kg (151 lb 6.4 oz). Body surface area is 1.76 meters squared.  No Pain (0) Comment: Data Unavailable   No LMP recorded. (Menstrual status: IUD).  Allergies reviewed: Yes  Medications reviewed: Yes    Medications: MEDICATION REFILLS NEEDED TODAY. Provider was notified. refill needed on Temazepam and Fludrocortisone.   Pharmacy name entered into [facility]:    [facility] [address] [place] - [address] [id number] - [place], [place] - [address] [facility] [address] [facility] [address]    Clinical concerns: None.      Genny Bishop MA            "

## 2021-06-08 LAB — DHEA-S SERPL-MCNC: <15 UG/DL (ref 35–430)

## 2021-06-08 NOTE — TELEPHONE ENCOUNTER
Called patient and provided her with the [place] Partial Hospitalization Program number.  Also gave her the web site if she were interested in viewing it.

## 2021-06-08 NOTE — PROGRESS NOTES
________________________________________  Medications Phoned  to Pharmacy [] yes [x]no  Name of Pharmacist:  List Medications, including dose, quantity and instructions    Medications ordered this visit were e-scribed.  Verified by order class [] yes  [x] no    Medication changes or discontinuations were communicated to patient's pharmacy: [] yes  [x] no    Dictation completed at time of chart check: [] yes  [x] no    I have checked the documentation for today s encounters and the above information has been reviewed and completed.

## 2021-06-08 NOTE — TELEPHONE ENCOUNTER
Received fax from pharmacy in [place] requesting patient Prozac 40 mg. Patient called and informed that her present prozac is 20 mg and it was send to [facility] and she agreed that she will pick it up from there. She stated that she had requested the medication from [place], therefore, staff called [place] pharmacy and informed them that patient is not on 40 mg Prozac.

## 2021-06-08 NOTE — TELEPHONE ENCOUNTER
Refill Approved    Rx renewed per Medication Renewal Policy. Medication was last renewed on 1/13/2020- set to class no print.    Nga Juarez, [facility] Triage/Med Refill 5/22/2020     Requested Prescriptions   Pending Prescriptions Disp Refills     buPROPion (WELLBUTRIN) 100 MG tablet [Pharmacy Med Name: BUPROPION HCL 100MG TABS] 180 tablet 3     Sig: TAKE ONE TABLET BY MOUTH TWICE A DAY       Tricyclics/Misc Antidepressant/Antianxiety Meds Refill Protocol Passed - 5/20/2020  5:02 AM        Passed - PCP or prescribing provider visit in last year     Last office visit with prescriber/PCP: 1/13/2020 Gillian Garzon MD OR same dept: 1/13/2020 Gillian Garzon MD OR same specialty: 1/13/2020 Gillian Garzon MD  Last physical: 11/6/2019 Last MTM visit: Visit date not found   Next visit within 3 mo: Visit date not found  Next physical within 3 mo: Visit date not found  Prescriber OR PCP: Gillian Garzon MD  Last diagnosis associated with med order: 1. Severe episode of recurrent major depressive disorder, without psychotic features (H)  - buPROPion (WELLBUTRIN) 100 MG tablet [Pharmacy Med Name: BUPROPION HCL 100MG TABS]; TAKE ONE TABLET BY MOUTH TWICE A DAY  Dispense: 180 tablet; Refill: 3    If protocol passes may refill for 12 months if within 3 months of last provider visit (or a total of 15 months).

## 2021-06-08 NOTE — TELEPHONE ENCOUNTER
Controlled Substance Refill Request  Medication Name:   Requested Prescriptions     Pending Prescriptions Disp Refills     clonazePAM (KLONOPIN) 0.5 MG tablet 30 tablet 0     Sig: Take 1 tablet (0.5 mg total) by mouth 2 (two) times a day as needed for anxiety.     Date Last Fill: 4/10/20  Requested Pharmacy: [facility]  Submit electronically to pharmacy  Controlled Substance Agreement on file:   Encounter-Level CSA Scan Date:    There are no encounter-level csa scan date.        Last office visit:  4/10/20 with AM

## 2021-06-08 NOTE — PROGRESS NOTES
Dana Dunn, PharmD sent to Kriss Bonilla MD               Hi,     I reviewed the medications listed in Epic, and did not come across any concerning drug interactions involving her current medications - including Mitotane or hydrocortisone for adrenal cancer. Of note the mitotane is a potent inducer of CY. It theoretically could be increasing metabolism of clonazepam, though a documented drug interaction between the two medications is not noted. There are certain psychotropic medications where the induction of CY could reduce effectiveness, but this is not a noted concern with current medications or a potential switch to venlafaxine or duloxetine.     Thanks,   Dana

## 2021-06-08 NOTE — TELEPHONE ENCOUNTER
RN cannot approve Refill Request    RN can NOT refill this medication PCP please review script- dosage different from active med list . Last office visit: 1/13/2020 Gillian Garzon MD Last Physical: 11/6/2019 Last MTM visit: Visit date not found Last visit same specialty: 1/13/2020 Gillian Garzon MD.  Next visit within 3 mo: Visit date not found  Next physical within 3 mo: Visit date not found      Nga HALL Larry, [facility] Triage/Med Refill 6/10/2020    Requested Prescriptions   Pending Prescriptions Disp Refills     FLUoxetine (PROZAC) 40 MG capsule [Pharmacy Med Name: FLUOXETINE HCL 40MG CAPS] 90 capsule 2     Sig: TAKE ONE CAPSULE BY MOUTH ONCE DAILY       SSRI Refill Protocol  Passed - 6/8/2020  5:02 AM        Passed - PCP or prescribing provider visit in last year     Last office visit with prescriber/PCP: 1/13/2020 Gillian Garzon MD OR same dept: 1/13/2020 Gillian Garzon MD OR same specialty: 1/13/2020 Gillian Garzon MD  Last physical: 11/6/2019 Last MTM visit: Visit date not found   Next visit within 3 mo: Visit date not found  Next physical within 3 mo: Visit date not found  Prescriber OR PCP: Gillian Garzon MD  Last diagnosis associated with med order: 1. Generalized anxiety disorder  - FLUoxetine (PROZAC) 40 MG capsule [Pharmacy Med Name: FLUOXETINE HCL 40MG CAPS]; TAKE ONE CAPSULE BY MOUTH ONCE DAILY  Dispense: 90 capsule; Refill: 2    2. Major depressive disorder, recurrent episode (H)  - FLUoxetine (PROZAC) 40 MG capsule [Pharmacy Med Name: FLUOXETINE HCL 40MG CAPS]; TAKE ONE CAPSULE BY MOUTH ONCE DAILY  Dispense: 90 capsule; Refill: 2    If protocol passes may refill for 12 months if within 3 months of last provider visit (or a total of 15 months).

## 2021-06-08 NOTE — TELEPHONE ENCOUNTER
Patient said she is doing good and is in the partial hospitalization program.  She did not have any other issues or concerns.

## 2021-06-09 LAB
COLLECT DURATION TIME UR: 24 H
CORTIS 24H UR-MRATE: 116 MCG/24 H (ref 3.5–45)
CORTISONE 24H UR-MRATE: 129 MCG/24 H (ref 17–129)
LAB SCANNED RESULT: NORMAL
SPECIMEN VOL 24H UR: 2150 ML

## 2021-06-09 NOTE — PROGRESS NOTES
Patient contacted me for refill.  Stated gabapentin dose was increased at last visit in June.    Plan:   Gabapentin 300 mg, 2 tabs 3 times daily. 90-day supply sent to pharm.

## 2021-06-09 NOTE — PATIENT INSTRUCTIONS - HE
Patient Education     If you have further questions regarding your plan of care, please call your provider at Phone: [phone]    If you were prescribed medication, be sure to fill your prescription and follow medication directions    If you experience any medication side effects or minor reactions, please contact us at Phone: [phone]    If you or your family member have suicidal thoughts, contact 911 or go to your nearest Emergency Room    [facility] Crisis  Adult [phone]  Child: [phone] [facility] Crisis  Adult: [phone]  Child: [phone] [facility] Crisis  [facility]   Adult/Child: [phone]   [facility] Crisis  Adult:  [phone]  Child:  [phone]     National Suicide Prevention Line:  1-667.821.5896    [facility] Adult [facility]    [address] [place] [address]   [id number]  [phone]      Mobile Crisis Team  [facility]    Adults, 18 and older  COPE- [phone]    Children, ages 17 and younger:  Child Crisis- [phone] Mobile Crisis Team  [facility]    24/7 Mobile Team and Crisis Line  [phone]     Text [place] to [phone] and you will be connected with a counselor who will help defuse the crisis and connect you to local resources.  Crisis Text Line is available 24 hours a day, 7 days a week.

## 2021-06-09 NOTE — PROGRESS NOTES
"Suzy Rodríguez is a 34 y.o. female who is being evaluated via a billable video visit.      The patient has been notified of following:     \"This video visit will be conducted via a call between you and your physician/provider. We have found that certain health care needs can be provided without the need for an in-person physical exam.  This service lets us provide the care you need with a video conversation.  If a prescription is necessary we can send it directly to your pharmacy.  If lab work is needed we can place an order for that and you can then stop by our lab to have the test done at a later time.    Video visits are billed at different rates depending on your insurance coverage. Please reach out to your insurance provider with any questions.    If during the course of the call the physician/provider feels a video visit is not appropriate, you will not be charged for this service.\"    Patient has given verbal consent to a Video visit? Yes    Will anyone else be joining your video visit? No        Video Start Time: 12:57 PM    -------------------------    Assessment/Plan:    Suzy Rodríguez is a 34 y.o. female presenting for:    1. Anxiety  Refill clonazepam sent to the pharmacy.  She will begin to break these tablets in half and take 0.25 mg daily.  She will contact us if there are further concerns.    I did discuss that these refills should come through her primary care physician however we will begin with her next week and she is currently on medication.  - clonazePAM (KLONOPIN) 0.5 MG tablet; Take 0.5 tablets (0.25 mg total) by mouth 2 (two) times a day as needed for anxiety.  Dispense: 30 tablet; Refill: 0        Medications Discontinued During This Encounter   Medication Reason     clonazePAM (KLONOPIN) 0.5 MG tablet Reorder           Chief Complaint:  Chief Complaint   Patient presents with     Medication Education Visit       Subjective:   Suzy Rodríguez is a pleasant 34 y.o. female being " evaluated via video visit today for the following concern/s:     Anxiety: Patient has a past medical history significant for anxiety.  She is currently on Prozac and Wellbutrin.  She had previously been on clonazepam which has been being weaned down.  She is now on 0.5 mg at night.  She states that her Dr. Garzon talked about continuing to wean.    She is hopeful to get a refill of her medications today.  She is planning on doing 0.25 mg at night for 1 to 2 months and then tapering off completely.      12 point review of systems completed and negative except for what has been described above    Social History     Tobacco Use   Smoking Status Never Smoker   Smokeless Tobacco Never Used       Current Outpatient Medications   Medication Sig Note     acetaminophen (TYLENOL) 325 MG tablet Take 650 mg by mouth. 1/13/2020: Prn     amino acids (DAILY AMINO ORAL) Take 325 mg by mouth daily. 6/15/2018: Received from: [facility] Received Sig: Take 325 mg by mouth daily (with breakfast)     buPROPion (WELLBUTRIN) 100 MG tablet TAKE ONE TABLET BY MOUTH TWICE A DAY      calcium carbonate (CALCIUM ANTACID) 400 mg calcium (1,000 mg) Chew Chew. Take 1 tablet once a day      cholecalciferol, vitamin D3, 1,000 unit tablet Take 2,000 Units by mouth daily.      clonazePAM (KLONOPIN) 0.5 MG tablet Take 0.5 tablets (0.25 mg total) by mouth 2 (two) times a day as needed for anxiety.      EPINEPHrine (EPIPEN) 0.3 mg/0.3 mL (1:1,000) atIn As directed      FLUoxetine (PROZAC) 20 MG capsule Take 3 capsules (60 mg total) by mouth daily.      gabapentin (NEURONTIN) 300 MG capsule 1 tab three times a day and 1 tab up to two times a day prn pain      hydrocortisone (CORTEF) 10 MG tablet Take 30 mg by mouth every morning.  7/6/2018: Received from: [facility] Received Sig: Take 1 tablet (10 mg) by mouth daily At 2pm until follow up with Endocrinology (total: 30mg daily)     hydrocortisone (CORTEF) 20 MG tablet Take 20 mg by mouth daily. In the  afternoon 7/6/2018: Received from: [facility] Received Sig: Take 1 tablet (20 mg) by mouth every morning (then 10mg at 2pm) until followup with Endocrinology     iron,carbonyl-vitamin C (VITRON-C) 65 mg iron- 125 mg TbEC Take 1 tablet by mouth.      levothyroxine (SYNTHROID, LEVOTHROID) 125 MCG tablet Take 125 mcg by mouth daily.      mitotane (LYSODREN) 500 mg tablet Take 1,000 mg by mouth. 1000mg two times a day alternating 1500mg in the morning and 1000mg at night.      MULTIVIT,CALC,MINS/IRON/FOLIC (ONE-A-DAY WOMENS FORMULA ORAL) Take by mouth. Take 1 tablet once a day      ondansetron (ZOFRAN) 4 MG tablet Take 1 tablet (4 mg total) by mouth every 6 (six) hours as needed for nausea.      prochlorperazine (COMPAZINE) 10 MG tablet TAKE ONE TABLET BY MOUTH EVERY 8 HOURS AS NEEDED FOR NAUSEA      traZODone (DESYREL) 150 MG tablet Take 1 tablet (150 mg total) by mouth at bedtime.          Objective:  There were no vitals filed for this visit.    There is no height or weight on file to calculate BMI.    General: No acute distress  Psych: Appropriate affect  HEENT: moist mucous membranes  Pulmonary: Breathing comfortably, speaking in complete sentences  Extremities: warm and well perfused with no edema  Skin: warm and dry with no rash         This note has been dictated and transcribed using voice recognition software.   Any errors in transcription are unintentional and inherent to the software.        Video-Visit Details    Type of service:  Video Visit    Video End Time (time video stopped): 1:10 PM  Originating Location (pt. Location): Home    Distant Location (provider location):  [facility]ILY [facility]/OB     Platform used for Video Visit: Milton Leal MD

## 2021-06-09 NOTE — TELEPHONE ENCOUNTER
Called pt. Advised her of PCPs message to schedule vv for this med mgmt. Pt did not think it made sense to wait until next week for an appointment since she is out of the medication.  She stated she will just contact her psychiatrist

## 2021-06-09 NOTE — TELEPHONE ENCOUNTER
LM for patient to call the clinic for clarification as why her PCP who initiated the taper wants Dr. Bonilla to take it over.

## 2021-06-09 NOTE — PROGRESS NOTES
"Post-op Surgical Weight Loss Diet Evaluation     Assessment:  Pt presents for 9-month post-op RD visit, s/p RNY on 6/8/2016 with Dr. Sutherland. Today we reviewed current eating habits and level of physical activity, and instructed on the changes that are required for successful bariatric outcomes.    Patient Progress: Pt is happy with surgery results, starting running (which she had never done before) and is experiencing increased energy    Pt's Initial Weight: 230 lbs  Weight: 141 lb (64 kg)  Weight loss from initial: 89  % Weight loss: 38.7 %    Body mass index is 24.01 kg/(m^2).     Concerns: Pt running 8miles/day, craving CHO/sugar - talked about the balance and the addition of healthy fats and CHO at meals to prevent snacking; fast meal times    Vitamins   Multi Vit with Iron: yes  Calcium Citrate: yes  B12: yes  D3: yes    Do you experience hunger? Yes - every 3 hours   Do you have \"dumping\" syndrome? none   Nausea: no  Vomiting: no  Diarrhea: no  Constipation: yes; taking fiber supplements  Hair loss:no    Diet Recall/Time:   Breakfast: Protein shake w/ coffee (30g)  Am Snack: 11- cottage cheese w/ fruit OR Greek Yogurt (12g)  Lunch: Salad with chicken (21g)  Pm snack: 4 - crackers  Dinner: 630 - Pro/Veg (21g)  HS Snack: occasional - SFjello  Cravings for sugar - jolly ranchers     Typical Snacks: Crackers, cottage cheese, string cheese    Estimated protein intake: 84 grams    Estimated portion size per meals: 3/4 cup/meal    Incorporation of vegetables, fruits, carbohydrates into diet/meals using   Bariatric \"Plate Method\"   The patient and I discussed the importance of including lean/low fat protein at each meal, including a vegetable/fruit, and limiting carbohydrate intake to less than 25% of plate volume. Always keeping within approved perimeters of post op meal portion sizes according to 9 months post op guidelines.    Healthy Fats: olive oil or coconut oil    Meals per week away from home: " "1X/month  Recommended limiting eating out to no more than 2x/week.    Meal Duration:10 minutes  Encouraged slowing meal times down, 20-30 minutes, chewing to applesauce consistency.     Fluid-meal separation:  Fluids are  30min before and 30 minutes after meals.  The patient and I reviewed the anatomy of the bypass and why  fluids from a meal is so important.    Fluid Intake  Water: 70oz  Caffeine: 10oz/day  Alcohol: none  Carbonation: none  Milk: none  Juice: none    Discussed the importance of adequate hydration after surgery and the goal of 64+ oz of fluid daily.   The patient understands the importance of avoiding all carbonated, caffeinated, and sweetened drinks; and instead choosing  64oz plain water.    Exercise  5X/week - started running (8miles/day)  1/2 marathon in June     Pt's understands that 30-60 minutes of daily activity is an important part of bariatric surgery success.   Encouraged pt to incorporate strength training exercise along with cardiovascular exercise as well, most days of the week.      PES statement:    1. (NC-1.4) Altered GI Function related to Alteration in gastrointestinal tract structure and/or function/ Decreased functional length of the GI tract as evidenced by Weight loss of >10% in six months;  Gastric bypass surgery    Intervention    Discussion  1. Discussed 9-month  Post-Op Nutritional Guidelines for RNY  2. Recommended to consume 15-20gm protein at 3 meals daily, along with protein supplement/\"planned protein containing snack\" of 15-30gm protein, to reach goal of 60-80 gm protein daily.  3. Educated on post-op vitamin regimen: Multi Vit + iron 2x/day, calcium citrate 400-600 mg 2x/day, 4913-6464 mcg of Sublingual B-12 daily, and 5000 IU Vitamin D3 daily (MVI and calcium can be taken at the same time BID)  4. Reviewed lean protein sources  5. Bariatric Plate Method-  including lean/low fat protein at each meal, including a vegetable/fruit, and limiting " "carbohydrate intake to less than 25% of plate volume. Approved perimeters of post op meal portion sizes according to 9 months post op guidelines.  Instructions  1. Include 15-20gm protein at each meal, along with protein supplement/\"planned protein containing snack\" of 15-30gm protein, to reach goal of 60-80 gm protein daily.  2. Increase fluid intake to 64oz daily: choose plain or calorie/carbonation-free beverages.  3. Incorporate daily structured activity, 30-60 minutes most days of the week  4. Recommended pt to start taking: Multi Vit + iron 2x/day, calcium citrate 400-600 mg 2x/day, 8865-7812 mcg of Sublingual B-12 daily, and 5000 IU Vitamin D3 daily. (MVI and calcium can be taken at the same time)  5. Read food labels more consistently: keeping total fat grams <10, total sugar grams <10,  saturated fat <3gm per serving, fiber >3gm per serving.   6. Increase vegetable/fruit intake, by having a vegetable or fruit with each meal daily.  7. Practice plate method: 1/2 plate lean/low fat protein source, vegetable/fruit, <25% of plate complex carbohydrates.  8. Separate fluids 30 minutes before/after meal times.  9. Practice eating off of smaller plates/bowls, chewing to applesauce consistency, taking 20-30 minutes to eat in a calm/relaxed environment without distractions of tv/email/cell phone.  -Pt will vary her runs and potentially include strength training     Handouts provided:  9-month  Post-Op Nutritional Guidelines for RNY    Monitor/Evaluation    Pt to follow up for 1 year  post-op visit with bariatrician     Time In: 9:35a  Time Out: 10:05a      ABN signed: Yes    "

## 2021-06-09 NOTE — CONSULTS
"[facility] Hematology and Oncology Consult Note    Patient: Suzy Rodríguez  MRN: [id number]  Date of Service: 04/04/2017      Reason for Visit:    1.  History of the MTHFR mutation.      Assessment/Plan:    1. MTHFR gene mutation: Patient has no history of thromboembolic disease.  Genetic testing was done due to history of multiple miscarriages.  She is now set to have an abdominoplasty.  This will be an outpatient procedure.  She does not need any perioperative anticoagulation.  The MTHFR gene mutation has not definitively been proven to be associated with increased risk of clotting.  Early ambulation should be adequate.    2.  Neutrophilia: Her neutrophil count has normalized with weight loss.    ECOG Performance   ECOG Performance Status: 0    Distress Assessment  Distress Assessment Score: No distress    Problem List:    1. MTHFR mutation       Staging History:    No matching staging information was found for the patient.    History:    Suzy is a 31-year-old woman who is referred for evaluation of recommendation for anticoagulation.  She is going to be having an abdominoplasty.  She has had successful weight reduction surgery.  She has a history of the MTHFR gene mutation.  This was tested due to multiple miscarriages in the past.  No personal history of thromboembolic disease.  Feeling well now.  No acute complaints.    Past History:    Past Medical History:   Diagnosis Date     Anemia     thought due to heavy menses.     Clotting disorder      Depression      GERD (gastroesophageal reflux disease)      Kidney stones     passed on their own     Menstrual disorder     menorrhagia     MTHFR mutation     believed to be homozygous for the mutation.     SHREYA on CPAP 12/17/2015    Stopped using CPAP the Fall of 2016 after weight loss as even at lower pressure/adjustment couldn't tolerate the cpap. Resting well, cautioned to watch for any signs of fatigue and consider \"titration study\" in the future to see if " cpap is still required at her new weight.     Pneumonia     hx of recurrent pneumonia issues/respiratory infections.     Polycystic ovary syndrome     able to get pregnant, not on meds.     Pulmonary embolism 2009 or so    briefly on wafarin.     Sleep apnea     cpap     Vitamin D deficiency     Family History   Problem Relation Age of Onset     Diabetes Mother      Diabetes Father      Hypertension Father      COPD Father      Cancer Maternal Grandmother      gastric     Heart disease Maternal Grandfather      Cancer Maternal Grandfather      lung     Heart disease Paternal Grandfather      Breast cancer Neg Hx      Colon cancer Neg Hx      Prostate cancer Neg Hx      Stroke Neg Hx       [unfilled] Social History     Social History     Marital status:      Spouse name: N/A     Number of children: N/A     Years of education: N/A     Occupational History     Not on file.     Social History Main Topics     Smoking status: Never Smoker     Smokeless tobacco: Never Used     Alcohol use No     Drug use: No     Sexual activity: No      Comment:  Ashvin     Other Topics Concern     Not on file     Social History Narrative        Allergies:    Allergies   Allergen Reactions     Coconut Oil Hives     Ibuprofen Hives and Swelling     Venom-Honey Bee Swelling     Review of Systems:    As above in the history.     Review of Systems otherwise Negative for:  General: chills, fever or night sweats  Psychological: anxiety or depression  Ophthalmic: blurry vision, double vision or loss of vision, vision change  ENT: epistaxis, oral lesions, hearing changes  Hematological and Lymphatic: bleeding, bruising, jaundice, swollen lymph nodes  Endocrine: hot flashes, malaise/lethargy or unexpected weight changes  Respiratory: cough, hemoptysis, orthopnea or shortness of breath/SANCHEZ  Cardiovascular: chest pain, edema, palpitations or PND  Gastrointestinal: abdominal pain, blood in stools, change in bowel habits, constipation,  "diarrhea or nausea/vomiting  Genito-Urinary: change in urinary stream, incontinence, frequency/urgency  Musculoskeletal: joint pain, stiffness, swelling, muscle pain or weakness  Neurological: dizziness, headaches, numbness/tingling  Dermatological: lumps and rash    ECOG performance status is 0.    Pain  Currently in Pain: No/denies    Physical Exam:    Recent Vitals 4/4/2017   Height 5' 4.25\"   Weight 139 lbs 11 oz   BSA (m2) 1.7 m2   /88   Pulse 70   Temp 97.6   Temp src 1   SpO2 98     General: patient appears stated age of 31 y.o.. Nontoxic and in no distress.   HEENT: Head: atraumatic, normocephalic. Sclerae anicteric.  Chest:  Normal respiratory effort  Cardiac:  No edema.   Abdomen: abdomen is non-distended  Extremities: normal tone and muscle bulk.  Skin: no lesions or rash. Warm and dry.   CNS: alert and oriented x3. Grossly non-focal.   Psychiatric: normal mood and affect.     Lab Results:    No results found for this or any previous visit (from the past 168 hour(s)).    Imaging Results:    No results found.      Signed by: Robert Kaur MD    "

## 2021-06-09 NOTE — TELEPHONE ENCOUNTER
Phone call to Suzy. Let her know Dr. Bonilla would not start prescribing the clonazepam as another physician prescribed it.  Suzy reported her last dose was the night before last, no clonazepam last night.   Referred her back to her PCP who has prescribed the medication.  She could ask about concerns for withdrawal/safety issues from her PCP.  She asked about withdrawal symptoms, I referred her to google them on internet so that she is aware.

## 2021-06-09 NOTE — PROGRESS NOTES
Assessment/Plan:     1. Routine general medical examination at a health care facility  Routine history and physical, updated in EMR.  Patient has lost a significant amount of weight since her last physical due to gastric bypass.  Congratulated these efforts.  Fasting labs are up-to-date per her weight loss physician.  Pap smear is up-to-date as well.  Patient declines Tdap vaccine today.  Plan repeat physical in 1 year.    2. Generalized anxiety disorder/Major depressive disorder, recurrent episode  Doing well on current dose of fluoxetine.  Does not desire any dosage change.  - FLUoxetine (PROZAC) 20 MG capsule; Take 1 capsule (20 mg total) by mouth daily.  Dispense: 90 capsule; Refill: 3    3. MTHFR mutation  Patient desires skin surgery and has been told by plastic surgery that they would like to see a hematology consult discussing perioperative risk and whether she would benefit from Lovenox.  - Ambulatory referral to Hematology    4. Left lower quadrant abdominal mass  Likely this is related to scar formation from her previous  section and is now apparent with loss of significant adipose tissue over her abdomen.  Advised patient to watch for growth or change, if changing in size, could image with ultrasound at that point.      Subjective:      Suzy Rodríguez is a 31 y.o. female who presents for an annual exam. The patient is sexually active. The patient participates in regular exercise: yes. The patient reports that there is not domestic violence in her life.    Lump in the left lower quadrant over previous  section scar.  Nontender to palpation, no change in size since she noticed the lump.    Healthy Habits:   Regular Exercise: Yes  Sunscreen Use: Yes  Healthy Diet: Yes  Dental Visits Regularly: Yes  Seat Belt: Yes  Sexually active: Yes  Self Breast Exam Monthly:No  Lipid Profile: Yes  Glucose Screen: Yes  Prevention of Osteoporosis: Yes and takes calcium twice daily      Immunization  History   Administered Date(s) Administered     Influenza, seasonal,quad inj 36+ mos 10/08/2015, 2016     Influenza, seasonal,quad inj 6-35 mos 2013     Immunization status: missing doses of Tdap, declines.    No exam data present    Gynecologic History  Patient's last menstrual period was 2017.  Contraception: tubal ligation  Last Pap: 10/8/15. Results were: normal      OB History   No data available       Current Outpatient Prescriptions   Medication Sig Dispense Refill     buPROPion (WELLBUTRIN) 100 MG tablet Take 1 tablet (100 mg total) by mouth 3 (three) times a day. 90 tablet 1     cyanocobalamin, vitamin B-12, 1,000 mcg Subl Place 1,000 mcg under the tongue daily.       EPINEPHrine (EPIPEN) 0.3 mg/0.3 mL (1:1,000) atIn As directed       FLUoxetine (PROZAC) 20 MG capsule Take 1 capsule (20 mg total) by mouth daily. 30 capsule 0     omeprazole (PRILOSEC) 20 MG capsule Take 20 mg by mouth daily.       pediatric multivitamin (FLINTSTONES) Chew chewable tablet Chew 1 tablet 2 (two) times a day.       S7 - WBRCHLB06/P5P/5MTHF/ALA/CR/VITD3 5/35/3/300/0.1MG/2500IU Take 1 capsule by mouth daily. 5-MTHF. Take 2 caps daily.       No current facility-administered medications for this visit.      Past Medical History:   Diagnosis Date     Anemia     thought due to heavy menses.     Clotting disorder      Depression      GERD (gastroesophageal reflux disease)      Kidney stones     passed on their own     Menstrual disorder     menorrhagia     MTHFR mutation     believed to be homozygous for the mutation.     Pneumonia     hx of recurrent pneumonia issues/respiratory infections.     Polycystic ovary syndrome     able to get pregnant, not on meds.     Pulmonary embolism  or so    briefly on wafarin.     Sleep apnea     cpap     Vitamin D deficiency      Past Surgical History:   Procedure Laterality Date      SECTION, CLASSIC      x2     SD LAP GASTRIC BYPASS/DERICK-EN-Y N/A 2016    Procedure:  "GASTRIC BYPASS LAPAROSCOPIC DERICK-EN-Y;  Surgeon: Randy Sutherland MD;  Location: [facility];  Service: General     TUBAL LIGATION       WISDOM TOOTH EXTRACTION Bilateral      Coconut oil; Ibuprofen; and Venom-honey bee  Family History   Problem Relation Age of Onset     Diabetes Mother      Diabetes Father      Hypertension Father      COPD Father      Cancer Maternal Grandmother      gastric     Heart disease Maternal Grandfather      Cancer Maternal Grandfather      lung     Heart disease Paternal Grandfather      Breast cancer Neg Hx      Colon cancer Neg Hx      Prostate cancer Neg Hx      Stroke Neg Hx      Social History     Social History     Marital status:      Spouse name: N/A     Number of children: N/A     Years of education: N/A     Occupational History     Not on file.     Social History Main Topics     Smoking status: Never Smoker     Smokeless tobacco: Never Used     Alcohol use 0.0 - 1.2 oz/week     0 - 1 Glasses of wine, 0 - 1 Cans of beer per week      Comment: occasional      Drug use: No     Sexual activity: Yes     Partners: Male     Birth control/ protection: Other, Surgical     Other Topics Concern     Not on file     Social History Narrative       Review of Systems  General:  Denies problem  Eyes: Denies problem  Ears/Nose/Throat: Denies problem  Cardiovascular: Denies problem  Respiratory:  Denies problem  Gastrointestinal:  Denies problem  Genitourinary: Denies problem  Musculoskeletal:  Denies problem  Skin: lump deep tissues, left lower quadrant as above  Neurologic: Denies problem  Psychiatric: depression and anxiety, currently well-controlled  Endocrine: Denies problem  Heme/Lymphatic: Denies problem   Allergic/Immunologic: Denies problem        Objective:         Vitals:    03/24/17 1144   BP: 118/74   Pulse: 80   Weight: 136 lb 6.4 oz (61.9 kg)   Height: 5' 4.25\" (1.632 m)     Body mass index is 23.23 kg/(m^2).    Physical Exam:  General Appearance: Alert, cooperative, " no distress, appears stated age  Head: Normocephalic, without obvious abnormality, atraumatic  Eyes: PERRL, conjunctiva/corneas clear, EOM's intact  Ears: Normal TM's and external ear canals, both ears  Nose: Nares normal, septum midline,mucosa normal, no drainage  Throat: Lips, mucosa, and tongue normal; teeth and gums normal  Neck: Supple, symmetrical, trachea midline, no adenopathy; thyroid: not enlarged, symmetric, no tenderness/mass/nodules  Back: Symmetric, no curvature, ROM normal, no CVA tenderness  Lungs: Clear to auscultation bilaterally, respirations unlabored  Breasts: No breast masses, tenderness, asymmetry, or nipple discharge; lumpy breast tissue bilaterally  Heart: Regular rate and rhythm, S1 and S2 normal, no murmur, rub, or gallop  Abdomen: Soft, non-tender, bowel sounds active all four quadrants, no masses, no organomegaly  Pelvic:Not examined  Extremities: Extremities normal, atraumatic, no cyanosis or edema  Skin: Skin color, texture, turgor normal, no rashes or lesions  Lymph nodes: Cervical, supraclavicular, and axillary nodes normal  Neurologic: Normal   Psychiatric: Speech is fluent and thought process is linear, affect is reactive and appropriate

## 2021-06-09 NOTE — TELEPHONE ENCOUNTER
Who is calling:  Patient  Reason for Call:  Pt is calling because she was told to contact provider when she was running out of her Clonazepam .5 mg, which she has taken her last one today.  Pt states at her last visit with provider they discussed tapering medication down to 0.25 mg.  Does Pt still need a Virtual visit?? Please call if so.  Date of last appointment with primary care: N/A  Okay to leave a detailed message: Yes

## 2021-06-10 LAB
COLLECT DURATION TIME SPEC: 24 H
CORTIS F 24H UR HPLC-MCNC: 54.6 UG/L
CORTIS F 24H UR-MRATE: 117.4 UG/D
CORTIS F/CREAT 24H UR: 103.02 UG/G CRT
CREAT 24H UR-MRATE: 1140 MG/D (ref 700–1600)
CREAT UR-MCNC: 53 MG/DL
IMP & REVIEW OF LAB RESULTS: ABNORMAL
SPECIMEN VOL ?TM UR: 2150 ML

## 2021-06-10 NOTE — TELEPHONE ENCOUNTER
Refill Approved    Rx renewed per Medication Renewal Policy. Medication was last renewed on 9/5/19.    Sulma Bonilla, [facility] Triage/Med Refill 8/13/2020     Requested Prescriptions   Pending Prescriptions Disp Refills     traZODone (DESYREL) 150 MG tablet [Pharmacy Med Name: TRAZODONE HCL 150MG TABS] 90 tablet 3     Sig: TAKE ONE TABLET BY MOUTH EVERY NIGHT AT BEDTIME       Tricyclics/Misc Antidepressant/Antianxiety Meds Refill Protocol Passed - 8/13/2020  5:01 AM        Passed - PCP or prescribing provider visit in last year     Last office visit with prescriber/PCP: 9/5/2019 Jayna Gomez DO OR same dept: 12/3/2019 Faby Rae MD OR same specialty: 1/13/2020 Gillian Garzon MD  Last physical: Visit date not found Last MTM visit: Visit date not found   Next visit within 3 mo: Visit date not found  Next physical within 3 mo: Visit date not found  Prescriber OR PCP: Jayna Gomez DO  Last diagnosis associated with med order: 1. Drug-induced insomnia (H)  - traZODone (DESYREL) 150 MG tablet [Pharmacy Med Name: TRAZODONE HCL 150MG TABS]; TAKE ONE TABLET BY MOUTH EVERY NIGHT AT BEDTIME  Dispense: 90 tablet; Refill: 3    If protocol passes may refill for 12 months if within 3 months of last provider visit (or a total of 15 months).

## 2021-06-10 NOTE — PROGRESS NOTES
Telemedicine Visit: The patient's condition can be safely assessed and treated via synchronous audio and visual telemedicine encounter.       Reason for Telemedicine Visit: Patient has requested telehealth visit     Originating Site (Patient Location): Patient's home     Distant Site (Provider Location): [facility]:  [facility]     Consent:  The patient/guardian has verbally consented to: the potential risks and benefits of telemedicine (video visit) versus in person care; bill my insurance or make self-payment for services provided; and responsibility for payment of non-covered services.      Mode of Communication:  Video Conference via [employer] (formerly [employer])     As the provider I attest to compliance with applicable laws and regulations related to telemedicine.     Date of Service: 2020     Name:  Suzy Rodríguez  :  1985  MRN:  [id number]     HPI:  Suzy Rodríguez is a 34 y.o. female with major depressive disorder recurrent, adjustment disorder with anxiety and depression, adrenal cancer.   Last visit in .  She completed PHP, feels this program was helpful and is using the skills that she learned.  She will find therapist in the community at [facility].      Reports she is 2 years cancer free.       ROS: Denies mood symptoms. Feels stable. Sleep, appetite good.     10 point ROS was negative except for the items listed in HPI.      Current Outpatient Medications:      acetaminophen (TYLENOL) 325 MG tablet, Take 650 mg by mouth., Disp: , Rfl:      amino acids (DAILY AMINO ORAL), Take 325 mg by mouth daily., Disp: , Rfl:      buPROPion (WELLBUTRIN) 100 MG tablet, TAKE ONE TABLET BY MOUTH TWICE A DAY, Disp: 180 tablet, Rfl: 2     calcium carbonate (CALCIUM ANTACID) 400 mg calcium (1,000 mg) Chew, Chew. Take 1 tablet once a day, Disp: , Rfl:      cholecalciferol, vitamin D3, 1,000 unit tablet, Take 2,000 Units by mouth daily., Disp: , Rfl:      EPINEPHrine (EPIPEN) 0.3 mg/0.3  mL (1:1,000) atIn, As directed, Disp: , Rfl:      FLUoxetine (PROZAC) 20 MG capsule, Take 3 capsules (60 mg total) by mouth daily., Disp: 90 capsule, Rfl: 1     gabapentin (NEURONTIN) 300 MG capsule, Take 2 capsules (600 mg total) by mouth 3 (three) times a day., Disp: 540 capsule, Rfl: 0     hydrocortisone (CORTEF) 10 MG tablet, Take 30 mg by mouth every morning. , Disp: , Rfl:      hydrocortisone (CORTEF) 20 MG tablet, Take 20 mg by mouth daily. In the afternoon, Disp: , Rfl:      iron,carbonyl-vitamin C (VITRON-C) 65 mg iron- 125 mg TbEC, Take 1 tablet by mouth., Disp: , Rfl:      levothyroxine (SYNTHROID, LEVOTHROID) 125 MCG tablet, Take 125 mcg by mouth daily., Disp: , Rfl:      mitotane (LYSODREN) 500 mg tablet, Take 1,000 mg by mouth. 1000mg two times a day alternating 1500mg in the morning and 1000mg at night., Disp: , Rfl:      MULTIVIT,CALC,MINS/IRON/FOLIC (ONE-A-DAY WOMENS FORMULA ORAL), Take by mouth. Take 1 tablet once a day, Disp: , Rfl:      traZODone (DESYREL) 150 MG tablet, TAKE ONE TABLET BY MOUTH EVERY NIGHT AT BEDTIME, Disp: 90 tablet, Rfl: 3     clonazePAM (KLONOPIN) 0.5 MG tablet, Take 0.5 tablets (0.25 mg total) by mouth 2 (two) times a day as needed for anxiety., Disp: 30 tablet, Rfl: 0     ondansetron (ZOFRAN) 4 MG tablet, Take 1 tablet (4 mg total) by mouth every 6 (six) hours as needed for nausea., Disp: 30 tablet, Rfl: 1     prochlorperazine (COMPAZINE) 10 MG tablet, TAKE ONE TABLET BY MOUTH EVERY 8 HOURS AS NEEDED FOR NAUSEA, Disp: 90 tablet, Rfl: 0        MSE:      Appearance: Neat  Motor: No abnormal movements  Gait: Normal  Speech: Normal rate rhythm and volume  Mood/Affect:  Full affect  Thought Process: logical.  Thought Content:  No SI, No homicide ideation.  Associations: Intact, no delusions.  Perceptions: No hallucinations.  Memory: recent and remote memory intact.  Attention span and concentration: normal.  Language: Intact.  Fund of Knowledge: Normal.  Insight and Judgement:  Adequate.        Impression:   1. MDD  recurrent, in remission  2.  Adjustment disorder with depression and anxiety, in remission  3. Chronic pain syndrome, improving       Plan:       1. Discussed plan for PCP to continue prescribing.  Patient agrees to this.      2. NO f/u at this time, She will contact me as needed or if mood slips.    3. Pt will be finding therapist in the community through [facility].     Staff message sent to PCP's:  Leola Garzon and Jason,  I met with Suzy today. She completed the [facility] hospital program last week.  She is doing well on current meds and mood is stable.  Since you prescribe her meds, I am handing her care back over to you.  I refilled her gabapentin and Prozac through October.  Suzy or you may contact me as needed.  Iris understands and agrees to this plan.    ------------------------------------------------------------------------------------       Start Time 9:00 AM  End Time 9:30 AM        Total Time and Coordination of Care:       30  Minutes spent in the care of this patient with >50% of this time was spent in tele-video  counseling specifically discussing and educating about the neurobiology, genetics of mental illness, pharmacologic and psycho-social treatment options, the risks, benefits and side effects of medication, medication adherence, importance of therapy.

## 2021-06-10 NOTE — TELEPHONE ENCOUNTER
Refill Approved    Rx renewed per Medication Renewal Policy. Medication was last renewed on 5/5/20.    Dimple Baugh, [facility] Triage/Med Refill 7/30/2020     Requested Prescriptions   Pending Prescriptions Disp Refills     FLUoxetine (PROZAC) 20 MG capsule [Pharmacy Med Name: FLUOXETINE HCL 20MG CAPS] 90 capsule 0     Sig: TAKE THREE CAPSULES BY MOUTH ONCE DAILY       SSRI Refill Protocol  Passed - 7/29/2020  5:02 AM        Passed - PCP or prescribing provider visit in last year     Last office visit with prescriber/PCP: Visit date not found OR same dept: 1/13/2020 Gillian Garzon MD OR same specialty: 1/13/2020 Gillian Garzon MD  Last physical: Visit date not found Last MTM visit: Visit date not found   Next visit within 3 mo: Visit date not found  Next physical within 3 mo: Visit date not found  Prescriber OR PCP: Kriss Bonilla MD  Last diagnosis associated with med order: 1. Generalized anxiety disorder  - FLUoxetine (PROZAC) 20 MG capsule [Pharmacy Med Name: FLUOXETINE HCL 20MG CAPS]; TAKE THREE CAPSULES BY MOUTH ONCE DAILY  Dispense: 90 capsule; Refill: 0    2. Major depressive disorder, recurrent episode (H)  - FLUoxetine (PROZAC) 20 MG capsule [Pharmacy Med Name: FLUOXETINE HCL 20MG CAPS]; TAKE THREE CAPSULES BY MOUTH ONCE DAILY  Dispense: 90 capsule; Refill: 0    If protocol passes may refill for 12 months if within 3 months of last provider visit (or a total of 15 months).

## 2021-06-11 NOTE — PROGRESS NOTES
12 mos post op lab orders placed for patient in preparation for appointment with  on 7/14/2017.    Faby Oliveira RN, CBN  [facility]  P [phone]  F [phone]

## 2021-06-11 NOTE — PROGRESS NOTES
Bariatric Care Clinic Follow Up Visit for Previous Bariatric Surgery   Date of visit: 7/14/2017  Physician: Home Chase MD  Primary Care is Gillian Garzon MD.  Suzy Rodríguez   31 y.o.  female    Date of Surgery: 6/8/16  Initial Weight: 230 lbs  Initial BMI: 39.6  Today's Weight:   Wt Readings from Last 1 Encounters:   07/14/17 145 lb 8 oz (66 kg)     Body mass index is 24.78 kg/(m^2).  Weight: 145 lb 8 oz (66 kg)     Assessment and Plan   Assessment: Suzy is a 31 y.o. year old female who is 13 month s/p  Tanika en Y Gastric Bypass with Dr. Sutherland.  She has had a durable weight loss of 85 lbs since surgery.  Overall compliance with the [facility]ariat[facility] Program has been excellent.  She's been using her new body training for a half marathon and has been using her vitamins as directed.  She was found to have elevated b12 and vitamin A levels so we discussed reducing her dose of both and she'll adapt accordingly. I suspect some increased A levels in her new multivitamin compared to the [place]'s chewable she used in the first few months post op.  She's contemplating a tummy tuck in the future and has been getting consultations, I advised her waiting until she's 18 months out to optimize healing potential.  .  Suzy Rodríguez feels that she has achieved her   preoperative goals for bariatric surgery.    Plan:  1.  Reduce B12 to 300-500mcg daily on average.  2. Reduce vitamin A intake to 6000-7500IUs daily on average.  3. Continue exercise regimen with family ([employer] race coming up).   4. SHREYA has resolved with weight loss and she's no longer fatigued without cpap.  5. GERD has resolved.    1. S/P gastric bypass     2. Postoperative malabsorption     3. Hypervitaminosis A     4. Elevated vitamin B12 level         Return in about 6 months (around 1/14/2018) for Recheck.     Bariatric Surgery Review   Interim History/LifeChanges: none    Patient Concerns: none    Medication  "changes: off the Flintstones and not using CPAP anymore and sleeping well.    Vitamin Intake:   Multivitamin   twice daily tab.   Vitamin D  yes   Calcium  yes    Vit. B-12    1000mcg     Habits:            Alcohol Intake  once weekly.   NSAID Use  avoids   Caffeine Use  coffee with sugar free cream.   Exercise  running plan, 4 times weekly, had to take running break due to pelvis fracture.   CPAP Use:  no longer   Birth Control  tubal ligation.             [facility]  Surgeon: FREIDA Sutherland MD  Anticoag for PE/DVT since last visit: No  Patient complains of hernia: No  Readmit since last visit: No  Reoperation since last visit: No  Vomiting: Not at all  GERD req medication: (!) Yes  Sleep Apnea: No  Hypertension req meds: No  Hyperlipidemia req meds: No  Diabetes: No    Symptoms  Hair Loss: No  Reactive Hypoglycemia: No  Abdominal Pain: No  Nausea: No  Heartburn: No  Constipation: No  Diarrhea: No  Trouble Breathing or Chest Pain: No  Leg Swelling: No  Skin rashes under folds: No                         LABS: \"Reviewed      LABS:  Lab Results   Component Value Date    WBC 8.6 07/11/2017    HGB 15.1 07/11/2017    HCT 44.4 07/11/2017    MCV 93 07/11/2017     07/11/2017      Lab Results   Component Value Date    CEJNPAKA79GE 31.7 07/11/2017    Lab Results   Component Value Date    HGBA1C 5.3 10/29/2015      Lab Results   Component Value Date    CHOL 152 07/11/2017    Lab Results   Component Value Date    PTH 36 07/11/2017         Lab Results   Component Value Date    FERRITIN 10 07/11/2017      Lab Results   Component Value Date    HDL 54 07/11/2017      Lab Results   Component Value Date    AOWEEWIK42 >2000 (H) 07/11/2017    Lab Results   Component Value Date    70783 192 (H) 07/11/2017      Lab Results   Component Value Date    LDLCALC 76 07/11/2017    Lab Results   Component Value Date    TSH 1.73 10/08/2015    Lab Results   Component Value Date    FOLATE >20.0 07/11/2017      Lab Results   Component Value Date    " "TRIG 112 07/11/2017    Lab Results   Component Value Date    ALT 22 07/11/2017    AST 19 07/11/2017    ALKPHOS 136 (H) 07/11/2017    BILITOT 0.6 07/11/2017    No results found for: TESTOSTERONE     No components found for: CHOLHDL Lab Results   Component Value Date    7597 86.5 (H) 07/11/2017      @resusfast(vitamin a: 1)@          Patient Profile   Social History     Social History Narrative        Past Medical History   Past Medical History:   Diagnosis Date     Anemia     thought due to heavy menses.     Clotting disorder      Depression      GERD (gastroesophageal reflux disease)      Kidney stones     passed on their own     Menstrual disorder     menorrhagia     MTHFR mutation     believed to be homozygous for the mutation.     SHREYA on CPAP 12/17/2015    Stopped using CPAP the Fall of 2016 after weight loss as even at lower pressure/adjustment couldn't tolerate the cpap. Resting well, cautioned to watch for any signs of fatigue and consider \"titration study\" in the future to see if cpap is still required at her new weight.     Pneumonia     hx of recurrent pneumonia issues/respiratory infections.     Polycystic ovary syndrome     able to get pregnant, not on meds.     Pulmonary embolism 2009 or so    briefly on wafarin.     Sleep apnea     cpap     Vitamin D deficiency      Patient Active Problem List   Diagnosis     Adipose Tissue Fibrolipoma     Generalized Anxiety Disorder     Urticaria Allergic Due To Envenomation     Major Depression, Recurrent     MTHFR mutation     S/P gastric bypass     Left lower quadrant abdominal mass     Hypervitaminosis A     Current Outpatient Prescriptions   Medication Sig Note     buPROPion (WELLBUTRIN) 100 MG tablet Take 1 tablet (100 mg total) by mouth 2 (two) times a day.      calcium carbonate (CALCIUM ANTACID) 400 mg calcium (1,000 mg) Chew Chew. Take 1 tablet once a day      cyanocobalamin, vitamin B-12, 1,000 mcg Subl Place 1,000 mcg under the tongue daily.      " "EPINEPHrine (EPIPEN) 0.3 mg/0.3 mL (1:1,000) atIn As directed      FLUoxetine (PROZAC) 20 MG capsule Take 1 capsule (20 mg total) by mouth daily.      omeprazole (PRILOSEC) 20 MG capsule Take 20 mg by mouth daily.      pediatric multivitamin (FLINTSTONES) Chew chewable tablet Chew 1 tablet 2 (two) times a day. 2017: Now doing swallowable.     S7 - MCWECUW50/P5P/5MTHF/ALA/CR/VITD3 5/35/3/300/0.1MG/2500IU Take 1 capsule by mouth daily. 5-MTHF. Take 2 caps daily.        Past Surgical History  She has a past surgical history that includes  section, classic; Tubal ligation; [employer] tooth extraction (Bilateral); and lap gastric bypass/elly-en-y (N/A, 2016).     Examination   /76 (Patient Site: Right Arm, Patient Position: Sitting, Cuff Size: Adult Regular)  Pulse 72  Ht 5' 4.25\" (1.632 m)  Wt 145 lb 8 oz (66 kg)  LMP 2017  SpO2 99%  Breastfeeding? No  BMI 24.78 kg/m2  Height: 5' 4.25\" (1.632 m) (2017 12:26 PM)  Initial Weight: 230 lbs (3/3/2017  9:00 AM)  Weight: 145 lb 8 oz (66 kg) (2017 12:26 PM)  Weight loss from initial: 89 (3/3/2017  9:00 AM)  % Weight loss: 38.7 % (3/3/2017  9:00 AM)  BMI (Calculated): 24.8 (2017 12:26 PM)  SpO2: 99 % (2017 12:26 PM)  Heart Rate (/min): 72 (2017 12:26 PM)  BP (mmHg): 136/76 (2017 12:26 PM)  Waist Circumference (In): 34.75 Inches (2017 12:26 PM)  Hip Circumference (In): 37.5 Inches (2017 12:26 PM)  Neck Circumference (In): 12.5 Inches (2017 12:26 PM)  NSAIDS: No (2017 12:26 PM)  General:  Alert and ambulatory,   HEENT:  No conjunctival pallor, moist mucous Membranes, neck is thin.  Pulmonary:  Normal respiratory effort, no cough, no audible wheezes/crackles.  CV:  Regular rate and Rhythm, no murmurs, pulses 2 plus  Abdominal: some loose skin, no rashes. Non tender.  Extremities: no edema. No tremor. Normal gait.  Skin:  Warm/dry, no pallor.  Pscyh/Mood: pleasant, mildly anxious affect. Accompanied by " her 2 young boys who are well behaved.         Counseling:   We reviewed the important post op bariatric recommendations:  -eating 3 meals daily  -eating protein first, getting >60gm protein daily  -eating slowly, chewing food well  -avoiding/limiting calorie containing beverages  -drinking water 15-30 minutes before or after meals  -limiting restaurant or cafeteria eating to twice a week or less    We discussed the importance of restorative sleep and stress management in maintaining a healthy weight.  We discussed the National Weight Control Registry healthy weight maintenance strategies and ways to optimize metabolism.  We discussed the importance of physical activity including cardiovascular and strength training in maintaining a healthier weight.  We discussed the importance of life-long vitamin supplementation and life-long  follow-up.    Suzy was reminded that, to avoid marginal ulcers she should avoid tobacco at all, alcohol in excess, caffeine in excess, and NSAIDS (unless indicated for cardioprotection or othewise and opposed by a PPI).    At least 25 minutes was spent in direct consultation and over 50% of the time devoted to counseling regarding maximizing the benefits of her previous bariatric surgery while minimizing risks of nutritional or structural complications.    Home Chase MD  [facility].  7/14/2017  12:46 PM

## 2021-06-12 NOTE — PROGRESS NOTES
Assessment/Plan:    Suzy Rodríguez is a 35 y.o. female presenting for:    1. Pain  Pain due to oncologic treatment.  Medical marijuana does seem to be beneficial and I encouraged her to use that as long as it is not price prohibitive.  We will increase her gabapentin to 3 capsules 3 times daily (from 2 capsules 3 times daily).  [facility] to let me know how she is doing.    2. Generalized anxiety disorder  Increase Wellbutrin to 150 mg twice daily.  Continue Prozac.  Refill sent.  - FLUoxetine (PROZAC) 20 MG capsule; Take 3 capsules (60 mg total) by mouth daily.  Dispense: 270 capsule; Refill: 1  - buPROPion (WELLBUTRIN SR) 150 MG 12 hr tablet; Take 1 tablet (150 mg total) by mouth 2 (two) times a day.  Dispense: 180 tablet; Refill: 1  - gabapentin (NEURONTIN) 300 MG capsule; Take 3 capsules (900 mg total) by mouth 3 (three) times a day.  Dispense: 810 capsule; Refill: 0    3. Major depressive disorder, recurrent episode (H)  - FLUoxetine (PROZAC) 20 MG capsule; Take 3 capsules (60 mg total) by mouth daily.  Dispense: 270 capsule; Refill: 1  - buPROPion (WELLBUTRIN SR) 150 MG 12 hr tablet; Take 1 tablet (150 mg total) by mouth 2 (two) times a day.  Dispense: 180 tablet; Refill: 1    Influenza vaccine given today.    Medications Discontinued During This Encounter   Medication Reason     amino acids (DAILY AMINO ORAL) Therapy completed     cholecalciferol, vitamin D3, 1,000 unit tablet Therapy completed     clonazePAM (KLONOPIN) 0.5 MG tablet Therapy completed     ondansetron (ZOFRAN) 4 MG tablet Therapy completed     diazePAM (VALIUM) 5 MG tablet Therapy completed     buPROPion (WELLBUTRIN) 100 MG tablet      gabapentin (NEURONTIN) 300 MG capsule Reorder     FLUoxetine (PROZAC) 20 MG capsule Reorder           Chief Complaint:  Chief Complaint   Patient presents with     Pain     Related to treatment     Depression     Flu Vaccine       Subjective:   Suzy Rodríguez  is a very pleasant 35-year-old female presenting to the clinic today to discuss pain management as well as mood medications.    She has a history notable for right-sided functioning adrenocortical carcinoma which was diagnosed in May 2018.  She is currently on adjuvant mitotane.    She states that she is having significant leg and low pelvic pain which she attributes to the medication.  She will be getting an ultrasound of her pelvis at some point soon and states that her oncologist has already ordered that.  She is currently taking gabapentin for pain but states that it is not fully effective for her.  When she uses medical marijuana through a vape device this resolves her pain almost completely.  She is worried about continuing to use this due to worry about addiction as well as social stigma.    The patient also notes that she has been feeling very depressed and anxious recently.  She is currently on 60 mg of Prozac daily.  She also takes Wellbutrin 100 mg twice daily immediate release tablets.  She does not have any active suicidal ideations at this point.    12 point review of systems completed and negative except for what has been described above    Social History     Tobacco Use   Smoking Status Never Smoker   Smokeless Tobacco Never Used       Current Outpatient Medications   Medication Sig Note     acetaminophen (TYLENOL) 325 MG tablet Take 650 mg by mouth. 1/13/2020: Prn     calcium carbonate (CALCIUM ANTACID) 400 mg calcium (1,000 mg) Chew Chew. Take 1 tablet once a day      EPINEPHrine (EPIPEN) 0.3 mg/0.3 mL (1:1,000) atIn As directed      FLUoxetine (PROZAC) 20 MG capsule Take 3 capsules (60 mg total) by mouth daily.      gabapentin (NEURONTIN) 300 MG capsule Take 3 capsules (900 mg total) by mouth 3 (three) times a day.      hydrocortisone (CORTEF) 10 MG tablet Take 30 mg by mouth every morning.  7/6/2018: Received from: [place] Received Sig: Take 1 tablet (10 mg) by mouth daily At 2pm until follow up  "with Endocrinology (total: 30mg daily)     hydrocortisone (CORTEF) 20 MG tablet Take 20 mg by mouth daily. In the afternoon 7/6/2018: Received from: [place] Received Sig: Take 1 tablet (20 mg) by mouth every morning (then 10mg at 2pm) until followup with Endocrinology     iron,carbonyl-vitamin C (VITRON-C) 65 mg iron- 125 mg TbEC Take 1 tablet by mouth.      levothyroxine (SYNTHROID, LEVOTHROID) 125 MCG tablet Take 125 mcg by mouth daily. Sunday patient takes 2 pills daily.      mitotane (LYSODREN) 500 mg tablet Take 1,000 mg by mouth. 2,000mg Twice a day      MULTIVIT,CALC,MINS/IRON/FOLIC (ONE-A-DAY WOMENS FORMULA ORAL) Take by mouth. Take 1 tablet once a day      prochlorperazine (COMPAZINE) 10 MG tablet TAKE ONE TABLET BY MOUTH EVERY 8 HOURS AS NEEDED FOR NAUSEA      traZODone (DESYREL) 150 MG tablet TAKE ONE TABLET BY MOUTH EVERY NIGHT AT BEDTIME      buPROPion (WELLBUTRIN SR) 150 MG 12 hr tablet Take 1 tablet (150 mg total) by mouth 2 (two) times a day.          Objective:  Vitals:    11/04/20 1523   BP: 114/80   Pulse: 75   Resp: 16   Temp: 97.3  F (36.3  C)   TempSrc: Oral   Weight: 155 lb 2 oz (70.4 kg)   Height: 5' 4\" (1.626 m)       Body mass index is 26.63 kg/m .    Vital signs reviewed and stable  General: No acute distress  Psych: Appropriate affect  HEENT: moist mucous membranes  Cardiovascular: regular rate and rhythm with no murmur  Pulmonary: clear to auscultation bilaterally with no wheeze  Extremities: warm and well perfused with no edema  Skin: warm and dry with no rash         This note has been dictated and transcribed using voice recognition software.   Any errors in transcription are unintentional and inherent to the software.  "

## 2021-06-13 NOTE — PROGRESS NOTES
"Suzy Rodríguez is a 35 y.o. female who is being evaluated via a billable video visit.      The patient has been notified of following:     \"This video visit will be conducted via a call between you and your physician/provider. We have found that certain health care needs can be provided without the need for an in-person physical exam.  This service lets us provide the care you need with a video conversation.  If a prescription is necessary we can send it directly to your pharmacy.  If lab work is needed we can place an order for that and you can then stop by our lab to have the test done at a later time.    Video visits are billed at different rates depending on your insurance coverage. Please reach out to your insurance provider with any questions.    If during the course of the call the physician/provider feels a video visit is not appropriate, you will not be charged for this service.\"    Patient has given verbal consent to a Video visit? Yes  How would you like to obtain your AVS? AVS Preference: Filmijobhart.  If dropped by the video visit, the video invitation should be sent to: Text to cell phone: Shorty  Will anyone else be joining your video visit? No        Video Start Time: 8:57 AM    Additional provider notes:     Assessment/Plan:       1. Acute nonintractable headache, unspecified headache type  2. Generalized body aches  3. Sore throat  Recommended patient be tested for COVID-19 and order was placed.  Discussed continued symptomatic treatment and quarantine for at least 10 days from the onset of her symptoms.  Advised that her  be tested as well given his symptoms.  She should seek urgent evaluation for severe shortness of breath or fever not responding to Tylenol or Motrin.  - Symptomatic COVID-19 Virus (CORONAVIRUS) PCR; Future        Subjective:       Suzy Rodríguez is a 35 y.o. female who presents for a discussion of headache, sore throat, loss of taste, and some shortness of breath.  " She is currently undergoing treatment for an adrenal carcinoma with mitotane, and adrenal suppression agent.  She also takes hydrocortisone for adrenal replacement.  She is not using any other form of chemotherapy at this point.  Patient's  works for the fire department and gets tested weekly.  His most recent test was negative, however recently, for the past 4 to 5 days, he has had some possible symptoms of Covid.  He was also exposed through a coworker.  Patient's symptoms started last night and have worsened today.  She describes a headache, sore throat, temp of 99.9  F this morning, diffuse body aches, and decreased taste.    The following portions of the patient's history were reviewed and updated as appropriate: allergies, current medications, past family history, past medical history, past social history and problem list.      Current Outpatient Medications:      acetaminophen (TYLENOL) 325 MG tablet, Take 650 mg by mouth., Disp: , Rfl:      buPROPion (WELLBUTRIN SR) 150 MG 12 hr tablet, Take 1 tablet (150 mg total) by mouth 2 (two) times a day., Disp: 180 tablet, Rfl: 1     calcium carbonate (CALCIUM ANTACID) 400 mg calcium (1,000 mg) Chew, Chew. Take 1 tablet once a day, Disp: , Rfl:      EPINEPHrine (EPIPEN) 0.3 mg/0.3 mL (1:1,000) atIn, As directed, Disp: , Rfl:      FLUoxetine (PROZAC) 20 MG capsule, Take 3 capsules (60 mg total) by mouth daily., Disp: 270 capsule, Rfl: 1     gabapentin (NEURONTIN) 300 MG capsule, Take 3 capsules (900 mg total) by mouth 3 (three) times a day., Disp: 810 capsule, Rfl: 0     hydrocortisone (CORTEF) 10 MG tablet, Take 30 mg by mouth every morning. , Disp: , Rfl:      hydrocortisone (CORTEF) 20 MG tablet, Take 20 mg by mouth daily. In the afternoon, Disp: , Rfl:      iron,carbonyl-vitamin C (VITRON-C) 65 mg iron- 125 mg TbEC, Take 1 tablet by mouth., Disp: , Rfl:      levothyroxine (SYNTHROID, LEVOTHROID) 125 MCG tablet, Take 125 mcg by mouth daily. Sunday patient  takes 2 pills daily., Disp: , Rfl:      mitotane (LYSODREN) 500 mg tablet, Take 1,000 mg by mouth. 2,000mg Twice a day, Disp: , Rfl:      MULTIVIT,CALC,MINS/IRON/FOLIC (ONE-A-DAY WOMENS FORMULA ORAL), Take by mouth. Take 1 tablet once a day, Disp: , Rfl:      prochlorperazine (COMPAZINE) 10 MG tablet, TAKE ONE TABLET BY MOUTH EVERY 8 HOURS AS NEEDED FOR NAUSEA, Disp: 90 tablet, Rfl: 0     traZODone (DESYREL) 150 MG tablet, TAKE ONE TABLET BY MOUTH EVERY NIGHT AT BEDTIME, Disp: 90 tablet, Rfl: 3    Review of Systems   Pertinent items are noted in HPI.      Objective:      There were no vitals taken for this visit.    General appearance: alert, appears stated age and cooperative  Head: Normocephalic, without obvious abnormality, atraumatic  Eyes: Conjunctivae clear, sclerae anicteric  Lungs: Breathing unlabored at the time of the exam, no cough during the visit  Skin: No visible rashes  Neurologic: Grossly normal, alert and oriented, good historian              Video-Visit Details    Type of service:  Video Visit    Video End Time (time video stopped): 9:04 AM  Originating Location (pt. Location): Home    Distant Location (provider location):  [facility]     Platform used for Video Visit: Milton Garzon MD

## 2021-06-16 ENCOUNTER — ALLIED HEALTH/NURSE VISIT (OUTPATIENT)
Dept: FAMILY MEDICINE | Facility: CLINIC | Age: 36
End: 2021-06-16
Payer: COMMERCIAL

## 2021-06-16 DIAGNOSIS — Z11.1 VISIT FOR MANTOUX TEST: Primary | ICD-10-CM

## 2021-06-16 PROCEDURE — 99207 PR NO CHARGE NURSE ONLY: CPT

## 2021-06-16 PROCEDURE — 86580 TB INTRADERMAL TEST: CPT

## 2021-06-16 NOTE — PROGRESS NOTES
The patient is asked the following questions today and these are her answers:    -Have you had a mantoux administered in the past 30 days?    No  -Have you had a previous positive Mantoux.  No  -Have you received BCG in the past.  No  -Have you had a live vaccine  (MMR, Varicella, OPV, Yellow Fever) in the last 6 weeks.  No  -Have you had and active  viral or bacterial infection in the past 6 weeks.  No  -Have you received corticosteroids or immunosuppressive agents in the past 6 weeks.  No  -Have you been diagnosed with HIV?  No  -Do you have a malignancy?  No    Mantoux Questionnaire: answers were all negative.    Emily Valente, CMA

## 2021-06-16 NOTE — TELEPHONE ENCOUNTER
gabapentin (NEURONTIN) 300 MG capsule [[id number]]    Electronically signed by: Ivette Leal MD on 20 Status:    Ordering user: Ivette Leal MD 20 Authorized by: Ivette Leal MD   Frequency: TID 20 - 90  days   Diagnoses   Generalized anxiety disorder [F41.1]     RN cannot approve Refill Request    RN can NOT refill this medication medication not on med list. Last office visit: 2020 Ivette Leal MD Last Physical: Visit date not found Last MTM visit: Visit date not found Last visit same specialty: 2020 Ivette Leal MD.  Next visit within 3 mo: Visit date not found  Next physical within 3 mo: Visit date not found      Reuben Koch, [facility] Connection Triage/Med Refill 3/18/2021    Requested Prescriptions   Pending Prescriptions Disp Refills     gabapentin (NEURONTIN) 300 MG capsule [Pharmacy Med Name: GABAPENTIN 300MG CAPS] 810 capsule 0     Sig: TAKE THREE CAPSULES BY MOUTH THREE TIMES A DAY       Gabapentin/Levetiracetam/Tiagabine Refill Protocol  Passed - 3/16/2021  1:24 PM        Passed - PCP or prescribing provider visit in past 12 months or next 3 months     Last office visit with prescriber/PCP: 2020 Ivette Leal MD OR same dept: 2020 Ivette Leal MD OR same specialty: 2020 Ivette Leal MD  Last physical: Visit date not found Last MTM visit: Visit date not found   Next visit within 3 mo: Visit date not found  Next physical within 3 mo: Visit date not found  Prescriber OR PCP: Ivette Leal MD  Last diagnosis associated with med order: 1. Generalized anxiety disorder  - gabapentin (NEURONTIN) 300 MG capsule [Pharmacy Med Name: GABAPENTIN 300MG CAPS]; TAKE THREE CAPSULES BY MOUTH THREE TIMES A DAY  Dispense: 810 capsule; Refill: 0    If protocol passes may refill for 12 months if within 3 months of last provider visit (or a total of 15 months).

## 2021-06-16 NOTE — PROGRESS NOTES
[facility] Follow Up Visit for Previous Bariatric Surgery   Date of visit: 3/14/2018  Physician: Home Chase MD  Primary Care is Gillian Garzon MD.  Suzy Rodríguez   32 y.o.  female    Date of Surgery: 6/8/16  Initial Weight: 230  Initial BMI: 39.8  Today's Weight:   Wt Readings from Last 1 Encounters:   03/14/18 154 lb 9.6 oz (70.1 kg)     Body mass index is 26.33 kg/(m^2).  Initial Weight: 230 lbs  Weight: 154 lb 9.6 oz (70.1 kg)  Weight loss from initial: 75.4  % Weight loss: 32.78 %     Assessment and Plan   Assessment: Suzy is a 32 y.o. year old female who is 18 months s/p  Tanika en Y Gastric Bypass with Dr. Sutherland.  She has had a durable weight loss of 74 lbs since surgery.  Overall compliance with the [facility] has been good apart from increased carbonation consumption and decreased exercise this winter. Acutely, she's dealing with a recent root canal that is bothering her.  She's gained about 9 lbs since her 13 month visit last July likely related to her afternoon grazing/snacking due to increased artificial sweetener use and carbonation with decreased exercise.  .  Suzy Rodríguez feels that she has achieved her   preoperative goals for bariatric surgery.    Plan:  1. For your B12, average 500mcg daily. If using the larger 2500mcg lozenges, twice weekly advised.  2. Continue D3 and multivitamins and calcium citrate.  3. Exercise should increase w/ weights twice weekly at minimum. Stop carbonated beverages.  4. I'll update you when your other 2 tests come back.  5. Follow up as planned with your dentist.  6. As far as timing for plastic surgery, late summer is advisable if weight stable.       No diagnosis found.    No Follow-up on file.     Bariatric Surgery Review   Interim History/LifeChanges: stable, bad tooth had root canal just 5 days ago, still painful.     Patient Concerns: some weight gain. occ shakiness/low blood sugar feeling.  "    Medication changes: no    Vitamin Intake:   Multivitamin   twice daily   Vitamin D  2000   Calcium  yes   Vit. B-12    yes, decrease     Habits:            Alcohol Intake  once weekly.   NSAID Use  avoids   Caffeine Use  coffee (1-2) and Monster or soda w/ lunch.   Exercise  trying to get 2-3x weekly.   CPAP Use:  na   Birth Control  tubal             MBSAQIP  Surgeon: FREIDA Sutherland MD  Anticoag for PE/DVT since last visit: No  Patient complains of hernia: No  Readmit since last visit: No  Reoperation since last visit: No  Vomiting: Not at all  GERD req medication: (!) Yes  Sleep Apnea: No  Hypertension req meds: No  Hyperlipidemia req meds: No  Diabetes: No    Symptoms  Hair Loss: No  Reactive Hypoglycemia: Yes (sometimes)  Abdominal Pain: No  Nausea: No  Heartburn: No  Constipation: No  Diarrhea: No  Trouble Breathing or Chest Pain: No  Leg Swelling: No  Skin rashes under folds: No                         LABS: \"Reviewed      LABS:  Lab Results   Component Value Date    WBC 8.6 07/11/2017    HGB 15.1 07/11/2017    HCT 44.4 07/11/2017    MCV 93 07/11/2017     07/11/2017      Lab Results   Component Value Date    PSFTDXZW39VX 31.7 07/11/2017    Lab Results   Component Value Date    HGBA1C 5.3 10/29/2015      Lab Results   Component Value Date    CHOL 152 07/11/2017    Lab Results   Component Value Date    PTH 36 07/11/2017         Lab Results   Component Value Date    FERRITIN 10 07/11/2017      Lab Results   Component Value Date    HDL 54 07/11/2017      Lab Results   Component Value Date    SQFDCOVV96 668 03/13/2018    Lab Results   Component Value Date    25898 192 (H) 07/11/2017      Lab Results   Component Value Date    LDLCALC 76 07/11/2017    Lab Results   Component Value Date    TSH 1.73 10/08/2015    Lab Results   Component Value Date    FOLATE >20.0 07/11/2017      Lab Results   Component Value Date    TRIG 112 07/11/2017    Lab Results   Component Value Date    ALT 22 07/11/2017    AST 19 07/11/2017 " "   ALKPHOS 136 (H) 07/11/2017    BILITOT 0.6 07/11/2017    No results found for: TESTOSTERONE     No components found for: CHOLHDL Lab Results   Component Value Date    7597 86.5 (H) 07/11/2017      @michelle(vitamin a: 1)@          Patient Profile   Social History     Social History Narrative        Past Medical History   Past Medical History:   Diagnosis Date     Anemia     thought due to heavy menses.     Clotting disorder      Depression      GERD (gastroesophageal reflux disease)      Kidney stones     passed on their own     Menstrual disorder     menorrhagia     MTHFR mutation     believed to be homozygous for the mutation.     SHREYA on CPAP 12/17/2015    Stopped using CPAP the Fall of 2016 after weight loss as even at lower pressure/adjustment couldn't tolerate the cpap. Resting well, cautioned to watch for any signs of fatigue and consider \"titration study\" in the future to see if cpap is still required at her new weight.     Pneumonia     hx of recurrent pneumonia issues/respiratory infections.     Polycystic ovary syndrome     able to get pregnant, not on meds.     Pulmonary embolism 2009 or so    briefly on wafarin.     Sleep apnea     cpap     Vitamin D deficiency      Patient Active Problem List   Diagnosis     Adipose Tissue Fibrolipoma     Generalized Anxiety Disorder     Urticaria Allergic Due To Envenomation     Major Depression, Recurrent     MTHFR mutation     S/P gastric bypass     Left lower quadrant abdominal mass     Hypervitaminosis A     Current Outpatient Prescriptions   Medication Sig     buPROPion (WELLBUTRIN) 100 MG tablet Take 1 tablet (100 mg total) by mouth 2 (two) times a day.     calcium carbonate (CALCIUM ANTACID) 400 mg calcium (1,000 mg) Chew Chew. Take 1 tablet once a day     cholecalciferol, vitamin D3, 1,000 unit tablet Take 2,000 Units by mouth daily.     EPINEPHrine (EPIPEN) 0.3 mg/0.3 mL (1:1,000) atIn As directed     FLUoxetine (PROZAC) 20 MG capsule Take 1 capsule (20 mg " "total) by mouth daily.     MULTIVIT,CALC,MINS/IRON/FOLIC (ONE-A-DAY WOMENS FORMULA ORAL) Take by mouth. Take 1 tablet once a day     omeprazole (PRILOSEC) 20 MG capsule Take 20 mg by mouth daily.       Past Surgical History  She has a past surgical history that includes  section, classic; Tubal ligation; [place] tooth extraction (Bilateral); and pr lap gastric bypass/elly-en-y (N/A, 2016).     Examination   BP (!) 136/92  Pulse 70  Ht 5' 4.25\" (1.632 m)  Wt 154 lb 9.6 oz (70.1 kg)  LMP 2018  SpO2 98%  Breastfeeding? No  BMI 26.33 kg/m2  Height: 5' 4.25\" (1.632 m) (3/14/2018 10:21 AM)  Initial Weight: 230 lbs (3/14/2018 10:21 AM)  Weight: 154 lb 9.6 oz (70.1 kg) (3/14/2018 10:21 AM)  Weight loss from initial: 75.4 (3/14/2018 10:21 AM)  % Weight loss: 32.78 % (3/14/2018 10:21 AM)  BMI (Calculated): 26.3 (3/14/2018 10:21 AM)  SpO2: 98 % (3/14/2018 10:21 AM)  Heart Rate (/min): 72 (2017 12:26 PM)  BP (mmHg): 136/76 (2017 12:26 PM)  Waist Circumference (In): 34.75 Inches (2017 12:26 PM)  Hip Circumference (In): 37.5 Inches (2017 12:26 PM)  Neck Circumference (In): 12.5 Inches (2017 12:26 PM)  NSAIDS: No (3/14/2018 10:21 AM)  General:  Alert and ambulatory,   HEENT:  No conjunctival pallor, moist mucous Membranes, neck is thin  Pulmonary:  Normal respiratory effort, no cough, no audible wheezes/crackles.  CV:  Regular rate and Rhythm, no murmurs, pulses 2 plus  Abdominal: stretch marks, no rash  Extremities: no edema.  Skin:  Warm/dry.  Pscyh/Mood: appears fatigued/tired with puffy eyes.         Counseling:   We reviewed the important post op bariatric recommendations:  -eating 3 meals daily  -eating protein first, getting >60gm protein daily  -eating slowly, chewing food well  -avoiding/limiting calorie containing beverages  -drinking water 15-30 minutes before or after meals  -limiting restaurant or cafeteria eating to twice a week or less    We discussed the importance " of restorative sleep and stress management in maintaining a healthy weight.  We discussed the National Weight Control Registry healthy weight maintenance strategies and ways to optimize metabolism.  We discussed the importance of physical activity including cardiovascular and strength training in maintaining a healthier weight.  We discussed the importance of life-long vitamin supplementation and life-long  follow-up.    Suzy was reminded that, to avoid marginal ulcers she should avoid tobacco at all, alcohol in excess, caffeine in excess, and NSAIDS (unless indicated for cardioprotection or othewise and opposed by a PPI).    At least 25 minutes was spent in direct consultation and over 50% of the time devoted to counseling regarding maximizing the benefits of her previous bariatric surgery while minimizing risks of nutritional or structural complications.    Home Chase MD  [facility].  3/14/2018  10:24 AM

## 2021-06-16 NOTE — PROGRESS NOTES
18 mos post op lab orders placed for patient in preparation for appointment with  on 3/14/18.    Faby Oliveira RN, CBN  [facility]  P [phone]  F [phone]

## 2021-06-17 NOTE — TELEPHONE ENCOUNTER
"Telephone Encounter by Raine Newberry RN at 7/14/2020  3:29 PM     Author: Raine Newberry RN Service: Behavioral Author Type: Registered Nurse    Filed: 7/14/2020  3:45 PM Encounter Date: 7/14/2020 Status: Signed    : Raine Newberry RN (Registered Nurse)       Patient Message  Open      7/14/2020  [facility]     Kriss Bonilla MD   Psychiatry    Conversation: Question about medications   (Newest Message First)   Suzy Rodríguez   to Kriss Bonilla MD           7/14/20 3:08 PM   Hi dr. Bonilla.  My prescription for gabapentin is out but the directions on the bottle to say \"take one, three times a day and 1 to 2 for breakthrough pain\".  Our last appointment we increased the gabapentin to 2 pills, 3 times a day.  Could you please send the new directions to the pharmacy? Suzy Brush      Date of Last Office Visit: 6/16/20  Date of Next Office Visit: 8/18/20  No shows since last visit: none  Cancellations since last visit: none  ED visits since last visit:  none  Medication Gabapentin 300 mg date last ordered: 5/5/20  Qty: 270 Refills: 1  Lapse in therapy greater than 7 days: no  Medication refill request verified as identical to current order: pt is requesting a change in the refill directions   Result of Last DAM, VPA, Li+ Level, CBC, or Carbamazepine Level (at or since last visit):NA     [] Medication refilled per AC M-1.   [x] Medication unable to be refilled by RN due to criteria not met as indicated below:     []Eligibility - not seen in last year    []Supervision - no future appointment    []Compliance     []Verification - order discrepancy    []Controlled Medication    []Medication not included in RN Protocol    []90 - day supply request    [x]Other  pt is requesting a change in the refill directions   Current Medication list:    acetaminophen (TYLENOL) 325 MG tablet  Take 650 mg by mouth.    amino acids (DAILY AMINO ORAL)  Take " 325 mg by mouth daily.    buPROPion (WELLBUTRIN) 100 MG tablet  TAKE ONE TABLET BY MOUTH TWICE A DAY    calcium carbonate (CALCIUM ANTACID) 400 mg calcium (1,000 mg) Chew  Chew. Take 1 tablet once a day    cholecalciferol, vitamin D3, 1,000 unit tablet  Take 2,000 Units by mouth daily.    clonazePAM (KLONOPIN) 0.5 MG tablet  Take 0.5 tablets (0.25 mg total) by mouth 2 (two) times a day as needed for anxiety.    EPINEPHrine (EPIPEN) 0.3 mg/0.3 mL (1:1,000) atIn  As directed    FLUoxetine (PROZAC) 20 MG capsule  Take 3 capsules (60 mg total) by mouth daily.    gabapentin (NEURONTIN) 300 MG capsule  1 tab three times a day and 1 tab up to two times a day prn pain    hydrocortisone (CORTEF) 10 MG tablet  Take 30 mg by mouth every morning.    hydrocortisone (CORTEF) 20 MG tablet  Take 20 mg by mouth daily. In the afternoon    iron,carbonyl-vitamin C (VITRON-C) 65 mg iron- 125 mg TbEC  Take 1 tablet by mouth.    levothyroxine (SYNTHROID, LEVOTHROID) 125 MCG tablet  Take 125 mcg by mouth daily.    mitotane (LYSODREN) 500 mg tablet  Take 1,000 mg by mouth. 1000mg two times a day alternating 1500mg in the morning and 1000mg at night.    MULTIVIT,CALC,MINS/IRON/FOLIC (ONE-A-DAY WOMENS FORMULA ORAL)  Take by mouth. Take 1 tablet once a day    ondansetron (ZOFRAN) 4 MG tablet  Take 1 tablet (4 mg total) by mouth every 6 (six) hours as needed for nausea.    prochlorperazine (COMPAZINE) 10 MG tablet  TAKE ONE TABLET BY MOUTH EVERY 8 HOURS AS NEEDED FOR NAUSEA    traZODone (DESYREL) 150 MG tablet  Take 1 tablet (150 mg total) by mouth at bedtime.        Medication Plan of Care at last office visit with MD/CNP:    Plan:       Reviewed risks/benefits of medication with patient.  PCP weaning Klonopin.     1.  Continue meds as above.  Follow-up 2-3 months.    2.  Continue day treatment.    [place] : unable to embed report due to remote status

## 2021-06-17 NOTE — PROGRESS NOTES
Assessment/Plan:        Diagnoses and all orders for this visit:    Calculus of ureter  -     oxyCODONE (ROXICODONE) 5 MG immediate release tablet; Take 1-2 tablets (5-10 mg total) by mouth every 4 (four) hours as needed.  Dispense: 30 tablet; Refill: 0  -     tamsulosin (FLOMAX) 0.4 mg Cp24; Take 1 capsule (0.4 mg total) by mouth daily for 14 days. With meal  Dispense: 14 capsule; Refill: 1  -     tolterodine (DETROL) 2 MG tablet; Take 1 tablet (2 mg total) by mouth 2 (two) times a day for 14 days. Start postoperatively  Dispense: 14 tablet; Refill: 1  -     Place sequential compression device; Standing  -     Insert and maintain IV; Standing  -     lidocaine 10 mg/mL (1 %) injection 0.1-0.3 mL; Inject 0.1-0.3 mL under the skin as needed (for IV insertion).  -     sodium chloride flush 3 mL (NS); Infuse 3 mL into a venous catheter Line Care.  -     Patient Stated Goal: Know what to expect after surgery  -     Verify informed consent; Standing  -     Diet NPO; Standing  -     XR Abdomen AP; Standing  -     levoFLOXacin 500 mg/100 mL IVPB 500 mg (LEVAQUIN); Infuse 100 mL (500 mg total) into a venous catheter 60 minutes before surgery or procedure for Prior to Procedure (On Call to OR).  -     NaCl 0.9% IR 0.9 % irrigation solution 1,000 mL (NS); Irrigate with 1,000 mL as directed once.    Hydronephrosis with urinary obstruction due to ureteral calculus    Calculus of kidney    Adrenal mass    Urinary tract stones  -     Urinalysis Macroscopic    Other orders  -     ondansetron (ZOFRAN-ODT) 4 MG disintegrating tablet; Place 4 mg under the tongue.  -     Discontinue: oxyCODONE-acetaminophen (PERCOCET) 5-325 mg per tablet; Take 1-2 tablets by mouth.      Stone Management Plan  \[facility]\"" Stone Management 5/9/2018   Urinary Tract Infection No suspicion of infection   Renal Colic Well controlled symptoms   Renal Failure No suspicion of renal failure   Current CT date 5/8/2018   Right sided stones? Yes   R Number of ureteral  stones No ureteral stones   R Number of kidney stones  1   R GSD of kidney stones < 2   R Hydronephrosis None   R Stone Event No current event   R Current Plan Observe   Observe rationale Limited stone burden with good prognosis for spontaneous passage   Left sided stones? Yes   L Number of ureteral stones 1   L GSD of ureteral stones 6   L Location of ureteral stone Proximal   L Number of kidney stones  2   L GSD of kidney stones 4 - 10   L Hydronephrosis Mild   L Stone Event New event   Diagnosis date 5/8/2018   Initial location of primary symptomatic stone Proximal   Initial GSD of primary symptomatic stone 6   L Current Plan Clear   Clear rationale Optimally managed electively         Subjective:      HPI  Ms. Suzy Rodríguez is a 32 y.o.  female presenting to the [facility] following recent Urgency Room visit for urolithiasis.    She is a first time unidentified composition stone former who has not required stone clearance procedures. She has not previously participated in stone risk evaluation. She has no identified modifiable stone risk factors. She has identified non-modifiable stone risks including:  early age at first stone and Tanika-en-Y gastric bypass (2016 with 80 lb weight loss).    Primary symptom starting 3 days ago was acute onset left flank pain. The pain was constant and sharp, radiating to left abdomen. At its worst, pain reached 10/10, and failed to improve with Tylenol. She had similar pain with a stone event during pregnancy ~ 10 years ago, which did not require surgical intervention. She had associated symptoms of chills with pain, nausea and vomiting. Evaluation in UR 5/8 was notable for CT confirming an obstructing left proximal ureteral stone and bilateral renal stones. Labs were remarkable for mild leukocytosis and hematuria with no signs of infection or renal impairment. She was discharged home with percocet and instructed to take it early this morning,  "in attempt to stay ahead of pain. She presents NPO since midnight, as recommended by UR, in anticipation of surgical intervention.    She currently is fatigued and has mild left flank pain. She denies current symptoms of fever, chills, nausea, vomiting, urinary frequency and dysuria.     CT scan is personally reviewed and demonstrates a mildly obstructing 6 x 4 mm left proximal ureteral stone. Additional 5 mm left lower pole stone and 1 mm left upper pole stone. There is a 2 mm right upper pole stone.  Incidental finding of \"Indeterminate 9 x 8 cm mass right upper quadrant likely arising from the adrenal gland though inseparable from liver and kidney\".    Significant labs from presentation include severe hematuria, no pyuria, negative nitrite, no bacteria, mildly elevated WBC (14.8), normal creatinine and normal potassium.    PLAN    31 yo F s/p Tanika-en-Y gastric bypass with remote hx of stone event, no prior surgical stone interventions. Recently diagnosed obstructive left urolithiasis, symptoms currently controlled. Bilateral renal stones. Incidental indeterminate right adrenal mass.    Discussed options for management including trial of passage versus surgical stone clearance. Suzy has a girl's trip to [place] for ~ 1 week, flying out early next week. She is adamant about keeping scheduled travel and would prefer to have the stone surgically removed.    Will proceed with ureterscopic stone clearance this week. Risks and benefits were detailed of ureteroscopic stone clearance including potential issues of urinary or systemic infection, ureteral injury, inaccessible stone, incomplete stone clearance, multiple surgeries, and stent related symptoms of urgency, frequency and hematuria Patient verbalized understanding. Patient agrees with plan as discussed. Preoperative evaluation with primary care is not requested as the referring documentation is adequate.    For symptom control, she was prescribed oxycodone, " "Flomax and tolterodine. Over the counter symptom control medications of Dramamine and Tylenol were recommended.    Addendum:  Following clinic visit, CT report from 18 was read and patient was informed of incidental findings, specifically probable large right adrenal mass. Per discussion with Radiologist, recommend further follow up with MR with and without contrast, scheduled later today. Further disposition to follow. Patient informed and agrees to proceed with further imaging.    Patient also seen and examined by STONE Melo   Review of Systems  A 12 point comprehensive review of systems is negative except for HPI    Past Medical History:   Diagnosis Date     Anemia     thought due to heavy menses.     Clotting disorder      Depression      GERD (gastroesophageal reflux disease)      Kidney stones     passed on their own     Menstrual disorder     menorrhagia     MTHFR mutation     believed to be homozygous for the mutation.     SHREYA on CPAP 2015    Stopped using CPAP the  after weight loss as even at lower pressure/adjustment couldn't tolerate the cpap. Resting well, cautioned to watch for any signs of fatigue and consider \"titration study\" in the future to see if cpap is still required at her new weight.     Pneumonia     hx of recurrent pneumonia issues/respiratory infections.     Polycystic ovary syndrome     able to get pregnant, not on meds.     Pulmonary embolism  or so    briefly on wafarin.     Sleep apnea     cpap     Vitamin D deficiency      Past Surgical History:   Procedure Laterality Date      SECTION, CLASSIC      x2     MI LAP GASTRIC BYPASS/DERICK-EN-Y N/A 2016    Procedure: GASTRIC BYPASS LAPAROSCOPIC DERICK-EN-Y;  Surgeon: Randy Sutherland MD;  Location: [facility];  Service: General     TUBAL LIGATION       WISDOM TOOTH EXTRACTION Bilateral      Current Outpatient Prescriptions   Medication Sig Dispense Refill     buPROPion (WELLBUTRIN) " 100 MG tablet Take 1 tablet (100 mg total) by mouth 2 (two) times a day. 180 tablet 0     calcium carbonate (CALCIUM ANTACID) 400 mg calcium (1,000 mg) Chew Chew. Take 1 tablet once a day       cholecalciferol, vitamin D3, 1,000 unit tablet Take 2,000 Units by mouth daily.       FLUoxetine (PROZAC) 20 MG capsule Take 1 capsule (20 mg total) by mouth daily. 90 capsule 0     MULTIVIT,CALC,MINS/IRON/FOLIC (ONE-A-DAY WOMENS FORMULA ORAL) Take by mouth. Take 1 tablet once a day       ondansetron (ZOFRAN-ODT) 4 MG disintegrating tablet Place 4 mg under the tongue.       oxyCODONE-acetaminophen (PERCOCET) 5-325 mg per tablet Take 1-2 tablets by mouth.       EPINEPHrine (EPIPEN) 0.3 mg/0.3 mL (1:1,000) atIn As directed       No current facility-administered medications for this visit.        Allergies   Allergen Reactions     Coconut Oil Hives     Ibuprofen Hives and Swelling     Venom-Honey Bee Swelling       Social History     Social History     Marital status:      Spouse name: N/A     Number of children: N/A     Years of education: N/A     Occupational History     Not on file.     Social History Main Topics     Smoking status: Never Smoker     Smokeless tobacco: Never Used     Alcohol use Yes      Comment: about 1 drink per week     Drug use: No     Sexual activity: No      Comment:  Ashvin     Other Topics Concern     Not on file     Social History Narrative     Family History   Problem Relation Age of Onset     Diabetes Mother      Diabetes Father      Hypertension Father      COPD Father      Cancer Maternal Grandmother      gastric     Heart disease Maternal Grandfather      Cancer Maternal Grandfather      lung     Heart disease Paternal Grandfather      Breast cancer Neg Hx      Colon cancer Neg Hx      Prostate cancer Neg Hx      Stroke Neg Hx      Objective:      Physical Exam  Vitals:    05/09/18 0901   BP: 136/80   Pulse: 60   Temp: 98.5  F (36.9  C)     General - well developed, well nourished,  appropriate for age. Appears no distress at this time   Heart - regular rate and rhythm, no murmur  Respiratory - normal effort, clear to auscultation, good air entry without adventitious noises  Abdomen - slender soft, non-tender, no hepatosplenomegaly, no masses.   - no flank tenderness, no suprapubic tenderness, kidney and bladder non-palpable  MSK - normal spinal curvature. no spinal tenderness. normal gait. muscular strength intact.  Neurology - cranial nerves II-XII grossly intact, normal sensation, no unsteadiness  Skin - intact, no bruising, no gouty tophi  Psych - oriented to time, place, and person, normal mood and affect.    Labs  Urinalysis POC (Office):  Nitrite, UA   Date Value Ref Range Status   05/09/2018 Negative Negative Final   09/21/2016 Negative Negative Final   06/02/2016 Negative Negative Final       Lab Urinalysis:  Blood, UA   Date Value Ref Range Status   05/09/2018 Moderate (!) Negative Final   09/21/2016 Large (!) Negative Final   06/02/2016 Trace (!) Negative Final     Nitrite, UA   Date Value Ref Range Status   05/09/2018 Negative Negative Final   09/21/2016 Negative Negative Final   06/02/2016 Negative Negative Final     Leukocytes, UA   Date Value Ref Range Status   05/09/2018 Trace (!) Negative Final   09/21/2016 Large (!) Negative Final   06/02/2016 Moderate (!) Negative Final     pH, UA   Date Value Ref Range Status   05/09/2018 6.5 5.0 - 8.0 Final   09/21/2016 7.5 4.5 - 8.0 Final   06/02/2016 6.5 4.5 - 8.0 Final

## 2021-06-17 NOTE — PATIENT INSTRUCTIONS - HE
Patient Instructions by Gillian Garzon MD at 10/25/2019 11:15 AM     Author: Gillian Garzon MD Service: -- Author Type: Physician    Filed: 10/25/2019 11:15 AM Encounter Date: 10/25/2019 Status: Signed    : Gillian Garzon MD (Physician)           Urinary Tract Infections in Women    Urinary tract infections (UTIs) are most often caused by bacteria. These bacteria enter the urinary tract. The bacteria may come from inside the body. Or they may travel from the skin outside the rectum or vagina into the urethra. Female anatomy makes it easy for bacteria from the bowel to enter a womans urinary tract, which is the most common source of UTI. This means women develop UTIs more often than men. Pain in or around the urinary tract is a common UTI symptom. But the only way to know for sure if you have a UTI for the healthcare provider to test your urine. The two tests that may be done are the urinalysis and urine culture.  Types of UTIs    Cystitis. A bladder infection (cystitis) is the most common UTI in women. You may have urgent or frequent need to pee. You may also have pain, burning when you pee, and bloody urine.    Urethritis. This is an inflamed urethra, which is the tube that carries urine from the bladder to outside the body. You may have lower stomach or back pain. You may also have urgent or frequent need to pee.    Pyelonephritis. This is a kidney infection. If not treated, it can be serious and damage your kidneys. In severe cases, you may need to stay in the hospital. You may have a fever and lower back pain.    Medicines to treat a UTI  Most UTIs are treated with antibiotics. These kill the bacteria. The length of time you need to take them depends on the type of infection. It may be as short as 3 days. If you have repeated UTIs, you may need a low-dose antibiotic for several months. Take antibiotics exactly as directed. Dont stop taking them until all of the medicine is gone. If you  stop taking the antibiotic too soon, the infection may not go away. You may also develop a resistance to the antibiotic. This can make it much harder to treat.  Lifestyle changes to treat and prevent UTIs  The lifestyle changes below will help get rid of your UTI. They may also help prevent future UTIs.    Drink plenty of fluids. This includes water, juice, or other caffeine-free drinks. Fluids help flush bacteria out of your body.    Empty your bladder. Always empty your bladder when you feel the urge to pee. And always pee before going to sleep. Urine that stays in your bladder can lead to infection. Try to pee before and after sex as well.    Practice good personal hygiene. Wipe yourself from front to back after using the toilet. This helps keep bacteria from getting into the urethra.    Use condoms during sex. These help prevent UTIs caused by sexually transmitted bacteria. Also don't use spermicides during sex. These can increase the risk for UTIs. Choose other forms of birth control instead. For women who tend to get UTIs after sex, a low-dose of a preventive antibiotic may be used. Be sure to discuss this option with your healthcare provider.    Follow up with your healthcare provider as directed. He or she may test to make sure the infection has cleared. If needed, more treatment may be started.  Date Last Reviewed: 7/1/2019 2000-2019 [employer]. [address] [place] [address]. All rights reserved. This information is not intended as a substitute for professional medical care. Always follow your healthcare professional's instructions.          Understanding Urinary Tract Infections (UTIs)  Most UTIs are caused by bacteria, although they may also be caused by viruses or fungi. Bacteria from the bowel are the most common source of infection. The infection may start because of any of the following:    Sexual activity. During sex, bacteria can travel from the penis, vagina, or rectum  into the urethra.     Bacteria on the skin outside the rectum may travel into the urethra. This is more common in women since the rectum and urethra are closer to each other than in men. Wiping from front to back after using the toilet and keeping the area clean can help prevent germs from getting to the urethra.    Blockage of urine flow through the urinary tract. If urine sits too long, germs may start to grow out of control.      Parts of the urinary tract  The infection can occur in any part of the urinary tract.    The kidneys collect and store urine.    The ureters carry urine from the kidneys to the bladder.    The bladder holds urine until you are ready to let it out.    The urethra carries urine from the bladder out of the body. It is shorter in women, so bacteria can move through it more easily. The urethra is longer in men, so a UTI is less likely to reach the bladder or kidneys in men.  Date Last Reviewed: 1/1/2017 2000-2019 [employer]. [address] [place] [address]. All rights reserved. This information is not intended as a substitute for professional medical care. Always follow your healthcare professional's instructions.        Based on the information that you have provided, I have placed an order for you to start treatment.  View your full visit summary for details. Click on the link below to access your visit summary.    Your pharmacist will address any questions you may have about taking the medication.

## 2021-06-17 NOTE — ANESTHESIA CARE TRANSFER NOTE
Last vitals:   Vitals:    05/11/18 1136   BP: 134/87   Pulse: 95   Resp: 16   Temp: 36.1  C (97  F)   SpO2: 100%     Patient's level of consciousness is drowsy  Spontaneous respirations: yes  Maintains airway independently: yes  Dentition unchanged: yes  Oropharynx: oropharynx clear of all foreign objects    QCDR Measures:  ASA# 20 - Surgical Safety Checklist: WHO surgical safety checklist completed prior to induction  PQRS# 430 - Adult PONV Prevention: 4558F - Pt received => 2 anti-emetic agents (different classes) preop & intraop  ASA# 8 - Peds PONV Prevention: NA - Not pediatric patient, not GA or 2 or more risk factors NOT present  PQRS# 424 - Caitlyn-op Temp Management: 4559F - At least one body temp DOCUMENTED => 35.5C or 95.9F within required timeframe  PQRS# 426 - PACU Transfer Protocol: - Transfer of care checklist used  ASA# 14 - Acute Post-op Pain: ASA14B - Patient did NOT experience pain >= 7 out of 10

## 2021-06-17 NOTE — ANESTHESIA POSTPROCEDURE EVALUATION
Patient: Suzy Rodríguez  #6  CYSTOSCOPY, LEFT URETEROSCOPY LASER LITHOTRIPSY STENT INSERTION  Anesthesia type: general    Patient location: PACU  Last vitals:   Vitals:    05/11/18 1145   BP: 138/85   Pulse: 80   Resp: 13   Temp:    SpO2: 98%     Post vital signs: stable  Level of consciousness: awake and responds to simple questions  Post-anesthesia pain: pain controlled  Post-anesthesia nausea and vomiting: no  Pulmonary: unassisted, return to baseline  Cardiovascular: stable and blood pressure at baseline  Hydration: adequate  Anesthetic events: no    QCDR Measures:  ASA# 11 - Caitlyn-op Cardiac Arrest: ASA11B - Patient did NOT experience unanticipated cardiac arrest  ASA# 12 - Caitlyn-op Mortality Rate: ASA12B - Patient did NOT die  ASA# 13 - PACU Re-Intubation Rate: ASA13B - Patient did NOT require a new airway mgmt  ASA# 10 - Composite Anes Safety: ASA10A - No serious adverse event    Additional Notes:

## 2021-06-17 NOTE — ANESTHESIA PREPROCEDURE EVALUATION
Anesthesia Evaluation      Patient summary reviewed   No history of anesthetic complications     Airway   Mallampati: II  Neck ROM: full   Pulmonary - normal exam    breath sounds clear to auscultation  (+) pneumonia, sleep apnea on CPAP, ,   (-) not a smoker    ROS comment: PE                         Cardiovascular   Exercise tolerance: > or = 10 METS (runner)  (-) murmur  Rhythm: regular  Rate: normal,    no murmur   ROS comment: MTHFR     Neuro/Psych    (+) depression, anxiety/panic attacks,     Endo/Other       Comments: PCOS  GBP  Adrenal mass    GI/Hepatic/Renal    (+) GERD,   chronic renal disease,           Dental - normal exam                        Anesthesia Plan  Planned anesthetic: general LMA    ASA 2   Induction: intravenous   Anesthetic plan and risks discussed with: patient  Anesthesia plan special considerations: antiemetics,   Post-op plan: other (jordon orders)

## 2021-06-17 NOTE — TELEPHONE ENCOUNTER
Refill Approved    Rx renewed per Medication Renewal Policy. Medication was last renewed on 11/4/20, last OV 11/17/20.    Aretha Baez, [facility] Triage/Med Refill 5/4/2021     Requested Prescriptions   Pending Prescriptions Disp Refills     buPROPion (WELLBUTRIN SR) 150 MG 12 hr tablet [Pharmacy Med Name: BUPROPION HCL ER (SR) 150MG TB12] 180 tablet 1     Sig: TAKE 1 TABLET (150 MG TOTAL) BY MOUTH 2 (TWO) TIMES A DAY.       Tricyclics/Misc Antidepressant/Antianxiety Meds Refill Protocol Passed - 5/4/2021  5:01 AM        Passed - PCP or prescribing provider visit in last year     Last office visit with prescriber/PCP: 11/4/2020 Ivette Leal MD OR same dept: 11/4/2020 Ivette Leal MD OR same specialty: 11/4/2020 Ivette Leal MD  Last physical: Visit date not found Last MTM visit: Visit date not found   Next visit within 3 mo: Visit date not found  Next physical within 3 mo: Visit date not found  Prescriber OR PCP: Ivette Leal MD  Last diagnosis associated with med order: 1. Generalized anxiety disorder  - buPROPion (WELLBUTRIN SR) 150 MG 12 hr tablet [Pharmacy Med Name: BUPROPION HCL ER (SR) 150MG TB12]; Take 1 tablet (150 mg total) by mouth 2 (two) times a day.  Dispense: 180 tablet; Refill: 1  - FLUoxetine (PROZAC) 20 MG capsule [Pharmacy Med Name: FLUOXETINE HCL 20MG CAPS]; TAKE THREE CAPSULES BY MOUTH ONCE DAILY  Dispense: 270 capsule; Refill: 1    2. Major depressive disorder, recurrent episode (H)  - buPROPion (WELLBUTRIN SR) 150 MG 12 hr tablet [Pharmacy Med Name: BUPROPION HCL ER (SR) 150MG TB12]; Take 1 tablet (150 mg total) by mouth 2 (two) times a day.  Dispense: 180 tablet; Refill: 1  - FLUoxetine (PROZAC) 20 MG capsule [Pharmacy Med Name: FLUOXETINE HCL 20MG CAPS]; TAKE THREE CAPSULES BY MOUTH ONCE DAILY  Dispense: 270 capsule; Refill: 1    If protocol passes may refill for 12 months if within 3 months of last provider visit (or a total of 15 months).                 FLUoxetine (PROZAC) 20 MG capsule [Pharmacy Med Name: FLUOXETINE HCL 20MG CAPS] 270 capsule 1     Sig: TAKE THREE CAPSULES BY MOUTH ONCE DAILY       SSRI Refill Protocol  Passed - 5/4/2021  5:01 AM        Passed - PCP or prescribing provider visit in last year     Last office visit with prescriber/PCP: 11/4/2020 Ivette Leal MD OR same dept: 11/4/2020 Ivette Leal MD OR same specialty: 11/4/2020 Ivette Leal MD  Last physical: Visit date not found Last MTM visit: Visit date not found   Next visit within 3 mo: Visit date not found  Next physical within 3 mo: Visit date not found  Prescriber OR PCP: Ivette Leal MD  Last diagnosis associated with med order: 1. Generalized anxiety disorder  - buPROPion (WELLBUTRIN SR) 150 MG 12 hr tablet [Pharmacy Med Name: BUPROPION HCL ER (SR) 150MG TB12]; Take 1 tablet (150 mg total) by mouth 2 (two) times a day.  Dispense: 180 tablet; Refill: 1  - FLUoxetine (PROZAC) 20 MG capsule [Pharmacy Med Name: FLUOXETINE HCL 20MG CAPS]; TAKE THREE CAPSULES BY MOUTH ONCE DAILY  Dispense: 270 capsule; Refill: 1    2. Major depressive disorder, recurrent episode (H)  - buPROPion (WELLBUTRIN SR) 150 MG 12 hr tablet [Pharmacy Med Name: BUPROPION HCL ER (SR) 150MG TB12]; Take 1 tablet (150 mg total) by mouth 2 (two) times a day.  Dispense: 180 tablet; Refill: 1  - FLUoxetine (PROZAC) 20 MG capsule [Pharmacy Med Name: FLUOXETINE HCL 20MG CAPS]; TAKE THREE CAPSULES BY MOUTH ONCE DAILY  Dispense: 270 capsule; Refill: 1    If protocol passes may refill for 12 months if within 3 months of last provider visit (or a total of 15 months).

## 2021-06-18 ENCOUNTER — ALLIED HEALTH/NURSE VISIT (OUTPATIENT)
Dept: FAMILY MEDICINE | Facility: CLINIC | Age: 36
End: 2021-06-18
Payer: COMMERCIAL

## 2021-06-18 DIAGNOSIS — Z11.1 VISIT FOR MANTOUX TEST: ICD-10-CM

## 2021-06-18 DIAGNOSIS — Z71.85 IMMUNIZATION COUNSELING: Primary | ICD-10-CM

## 2021-06-18 PROCEDURE — 99207 PR NO CHARGE NURSE ONLY: CPT

## 2021-06-18 NOTE — PROGRESS NOTES
Mantoux result:  Lab Results   Component Value Date    PPDREDNESS 0 06/18/2021    PPDINDURATIO 0 06/18/2021     Is induration greater than 5mm?  No     Negative mantoux.  Almita Ellis RN

## 2021-06-18 NOTE — PROGRESS NOTES
"  Assessment/Plan:       1. Adrenal mass  This is functional, and patient has Cushing's syndrome.  She is being seen by Endocrinology at the [facility] and has an appointment next week with Oncology.  Spoke with endocrinologist Dr. Roel Beverly and decided to start patient on spironolactone for her blood pressure while awaiting resection of this mass.  Likely her hypertension will resolve once this mass is resected.    2. Hypertension due to endocrine disorder  Workup of hypertension included the below testing as endocrine testing was completed after the visit.  Below workup is unremarkable with the exception of an elevated white count.  - Comprehensive Metabolic Panel  - HM1(CBC and Differential)  - Electrocardiogram Perform and Read  - Urinalysis  - HM1 (CBC with Diff)    3. Asymptomatic microscopic hematuria  Urine culture performed due to UA showing microscopic hematuria.  This is negative.  Plan recheck UA down the road.  - Culture, Urine        Subjective:       Suzy Rodríguez is a 32 y.o. female who presents for follow-up of her adrenal mass.  She is quite anxious today, reports that she \"doesn't know what's going on\" and \"nobody is telling me anything\".  We spent some time today reviewing outside records.  Patient has a large, apparently adrenal, mass that was found incidentally on imaging to follow up on a kidney stone.  She was then apparently referred to endocrinology to determine whether the mass was functional.  Testing was completed including urine metanephrines and catecholamines, urine cortisol, and dexamethasone suppression test.  Also aldosterone and renin activity were performed.  Results were unavailable at the time of the visit, and so we discussed further workup of hypertension.  After the visit, I was able to speak with Dr. Beverly over the phone and patient's dexamethasone suppression test returned positive, and patient meets criteria for Cushing's syndrome.  Patient reports " "feeling very anxious, but is in no pain, reports she has no symptoms from the mass.  She does in retrospect feel like her abdominal girth has been slowly increasing, but she attributed this to weight gain.    Labs Reviewed:  Plasma metanephrines 5/11/18: normal    Imaging Reviewed:  MRI abdomen w/o and with contrast 5/9/18:  1. Large indeterminant mass right adrenal gland measures 9.2x8.1x7.5 cm.  No features to confirm benign etiology and remains indeterminant.  Adrenal cortical carcinoma remains in the differential.    2. No other significant findings.  3. Benign cavernous hemangioma in the right lobe of the liver.    The following portions of the patient's history were reviewed and updated as appropriate: allergies, current medications, past medical history and problem list.      Current Outpatient Prescriptions:      buPROPion (WELLBUTRIN) 100 MG tablet, Take 1 tablet (100 mg total) by mouth 2 (two) times a day., Disp: 180 tablet, Rfl: 0     calcium carbonate (CALCIUM ANTACID) 400 mg calcium (1,000 mg) Chew, Chew. Take 1 tablet once a day, Disp: , Rfl:      cholecalciferol, vitamin D3, 1,000 unit tablet, Take 2,000 Units by mouth daily., Disp: , Rfl:      EPINEPHrine (EPIPEN) 0.3 mg/0.3 mL (1:1,000) atIn, As directed, Disp: , Rfl:      FLUoxetine (PROZAC) 20 MG capsule, Take 1 capsule (20 mg total) by mouth daily., Disp: 90 capsule, Rfl: 0     MULTIVIT,CALC,MINS/IRON/FOLIC (ONE-A-DAY WOMENS FORMULA ORAL), Take by mouth. Take 1 tablet once a day, Disp: , Rfl:      ondansetron (ZOFRAN-ODT) 4 MG disintegrating tablet, Place 4 mg under the tongue., Disp: , Rfl:      tolterodine (DETROL) 2 MG tablet, Take 1 tablet (2 mg total) by mouth 2 (two) times a day for 14 days. Start postoperatively, Disp: 14 tablet, Rfl: 1    Review of Systems   Pertinent items are noted in HPI.      Objective:      BP (!) 146/100  Pulse 72  Temp 98.7  F (37.1  C) (Oral)   Resp 12  Ht 5' 4\" (1.626 m)  Wt 159 lb 1 oz (72.2 kg)  LMP " 05/07/2018  BMI 27.3 kg/m2    General appearance: alert, appears stated age and cooperative  Head: Normocephalic, without obvious abnormality, atraumatic  Eyes: conjunctivae clear, sclerae anicteric  Lungs: clear to auscultation bilaterally  Heart: regular rate and rhythm, S1, S2 normal, no murmur, click, rub or gallop  Abdomen: soft, nontender, no masses are palpable  Extremities: extremities normal, atraumatic, no cyanosis or edema  Neurologic: Grossly normal  Psychiatric: affect is very anxious        Results for orders placed or performed in visit on 06/01/18   Culture, Urine   Result Value Ref Range    Culture No Growth    Comprehensive Metabolic Panel   Result Value Ref Range    Sodium 142 136 - 145 mmol/L    Potassium 4.3 3.5 - 5.0 mmol/L    Chloride 104 98 - 107 mmol/L    CO2 26 22 - 31 mmol/L    Anion Gap, Calculation 12 5 - 18 mmol/L    Glucose 70 70 - 125 mg/dL    BUN 14 8 - 22 mg/dL    Creatinine 0.68 0.60 - 1.10 mg/dL    GFR MDRD Af Amer >60 >60 mL/min/1.73m2    GFR MDRD Non Af Amer >60 >60 mL/min/1.73m2    Bilirubin, Total 0.5 0.0 - 1.0 mg/dL    Calcium 9.4 8.5 - 10.5 mg/dL    Protein, Total 6.5 6.0 - 8.0 g/dL    Albumin 4.0 3.5 - 5.0 g/dL    Alkaline Phosphatase 123 (H) 45 - 120 U/L    AST 22 0 - 40 U/L    ALT 29 0 - 45 U/L   Urinalysis   Result Value Ref Range    Color, UA Yellow Colorless, Yellow, Straw, Light Yellow    Clarity, UA Clear Clear    Glucose, UA Negative Negative    Bilirubin, UA Negative Negative    Ketones, UA Negative Negative    Specific Gravity, UA 1.010 1.005 - 1.030    Blood, UA Trace (!) Negative    pH, UA 7.0 5.0 - 8.0    Protein, UA Negative Negative mg/dL    Urobilinogen, UA 0.2 E.U./dL 0.2 E.U./dL, 1.0 E.U./dL    Nitrite, UA Negative Negative    Leukocytes, UA Large (!) Negative   HM1 (CBC with Diff)   Result Value Ref Range    WBC 13.1 (H) 4.0 - 11.0 thou/uL    RBC 5.25 3.80 - 5.40 mill/uL    Hemoglobin 16.2 (H) 12.0 - 16.0 g/dL    Hematocrit 48.2 (H) 35.0 - 47.0 %    MCV  92 80 - 100 fL    MCH 30.9 27.0 - 34.0 pg    MCHC 33.7 32.0 - 36.0 g/dL    RDW 12.1 11.0 - 14.5 %    Platelets 289 140 - 440 thou/uL    MPV 7.6 7.0 - 10.0 fL    Neutrophils % 69 50 - 70 %    Lymphocytes % 21 20 - 40 %    Monocytes % 7 2 - 10 %    Eosinophils % 1 0 - 6 %    Basophils % 1 0 - 2 %    Neutrophils Absolute 9.1 (H) 2.0 - 7.7 thou/uL    Lymphocytes Absolute 2.8 0.8 - 4.4 thou/uL    Monocytes Absolute 1.0 (H) 0.0 - 0.9 thou/uL    Eosinophils Absolute 0.1 0.0 - 0.4 thou/uL    Basophils Absolute 0.1 0.0 - 0.2 thou/uL       EKG: sinus rhythm, normal rate, no ST or T wave changes (my read)       I spent a total of 40 minutes with the patient today, >50% in face-to-face counseling and coordination of care in relation to the above issues.

## 2021-06-18 NOTE — PROGRESS NOTES
[facility] Follow Up Visit for Previous Bariatric Surgery   Date of visit: 6/8/2018  Physician: Home Chase MD  Primary Care is Gillian Garzon MD.  Suzy Rodríguez   32 y.o.  female    Date of Surgery: 6/8/16  Initial Weight: 230 lbs.   Initial BMI: 39.8  Today's Weight:   Wt Readings from Last 1 Encounters:   06/08/18 158 lb 9.6 oz (71.9 kg)     Body mass index is 27.22 kg/(m^2).  Initial Weight: 230 lbs  Weight: 158 lb 9.6 oz (71.9 kg)  Weight loss from initial: 71.4  % Weight loss: 31.04 %     Assessment and Plan   Assessment: Suzy is a 32 y.o. year old female who is 2 years s/p  Tanika en Y Gastric Bypass with Dr. Sutherland.  She has had a durable weight loss of 71.4 lbs since surgery.  Overall compliance with the [facility] Program has been excellent.  Her exercise regimen has declined since her Cushing's diagnosis but plans to start up running again (owns treadmill) once her recovery from 6/25/18 surgery completes. I will have her bariatric dietician reach out to her this month to discuss some ways to optimize nutritional needs post operatively and we'll check her bariatric nutritional tests making sure that things are optimized heading into surgery.  Her Cushing's is having influence on the 20 lbs or so of weight gain from her Ze weight but she's maintaining an overall magnificient result and continues to be in remission of her sleep apnea and GERD sx that were problematic prior to bariatric surgery.  .  Suzy Rodríguez feels that she has achieved her   preoperative goals for bariatric surgery.    Plan:  1. Follow up as planned for your adrenal surgery.  Should help with some of the hypertension/weight gain/fluid retention.  Can follow up prn if needed help with further weight reduction but she has maintained excellent weight loss since her surgery 2 years ago considering the diagnosis of Cushings recently, maintining 31% total body weight reduction.  2. Get  your nutritional labs done today (orders are in).   3. I'll forward your chart to our dietician to help you optimize your post op nutritional needs.    4. Find ways to slowly increase your exercise after your recovery period. Consider the [employer] Dash in October as a goal/outing.  5. Continue to try to reduce carbonation.    1. Postoperative malabsorption  Comprehensive Metabolic Panel    Vitamin D, Total (25-Hydroxy)    Parathyroid Hormone Intact    Vitamin B12    Vitamin B1 (Thiamine), Whole Blood (VIT B1 WB)    Ferritin    Zinc, Serum or Plasma    Magnesium    Folate, Serum   2. Hx of gastric bypass     3. Cushing disease         Return in about 1 year (around 6/8/2019). follow up sooner if nutritional or further weight gain concerns.     Bariatric Surgery Review   Interim History/LifeChanges: found to have adrenal mass and due for surgery at the End of June at [facility]. Cushings.    Patient Concerns: none.    Medication changes: on spironolactone now.    Appetite (1-10): increased.     GERD sx at all? no    Vitamin Intake:   Multivitamin   twice daily   Vitamin D   2000IUs   Calcium  twice daily.    Vit. B-12    takes 1000mcg every 3 days as was high before.     Habits:            Alcohol Intake  once weekly   NSAID Use  avoids   Caffeine Use  coffee (sugar free creamers).    Exercise  running 2-3 miles daily, not much this month.   CPAP Use:  no longer needing.   Birth Control  surgical                                            LABS: ordered      LABS:  Lab Results   Component Value Date    WBC 13.1 (H) 06/01/2018    HGB 16.2 (H) 06/01/2018    HCT 48.2 (H) 06/01/2018    MCV 92 06/01/2018     06/01/2018      Lab Results   Component Value Date    IFGDJCVA38JX 31.7 07/11/2017    Lab Results   Component Value Date    HGBA1C 5.3 10/29/2015      Lab Results   Component Value Date    CHOL 152 07/11/2017    Lab Results   Component Value Date    PTH 36 07/11/2017         Lab Results   Component Value Date     "FERRITIN 10 07/11/2017      Lab Results   Component Value Date    HDL 54 07/11/2017      Lab Results   Component Value Date    WEDTTDJT51 668 03/13/2018    Lab Results   Component Value Date    54188 192 (H) 07/11/2017      Lab Results   Component Value Date    LDLCALC 76 07/11/2017    Lab Results   Component Value Date    TSH 1.73 10/08/2015    Lab Results   Component Value Date    FOLATE >20.0 07/11/2017      Lab Results   Component Value Date    TRIG 112 07/11/2017    Lab Results   Component Value Date    ALT 29 06/01/2018    AST 22 06/01/2018    ALKPHOS 123 (H) 06/01/2018    BILITOT 0.5 06/01/2018    No results found for: TESTOSTERONE     No components found for: CHOLHDL Lab Results   Component Value Date    7597 86.5 (H) 07/11/2017      @resusfast(vitamin a: 1)@          Patient Profile   Social History     Social History Narrative        Past Medical History   Past Medical History:   Diagnosis Date     Anemia     thought due to heavy menses.     Clotting disorder      Depression      GERD (gastroesophageal reflux disease)      Kidney stones     passed on their own     Menstrual disorder     menorrhagia     MTHFR mutation     believed to be homozygous for the mutation.     SHREYA on CPAP 12/17/2015    Stopped using CPAP the Fall of 2016 after weight loss as even at lower pressure/adjustment couldn't tolerate the cpap. Resting well, cautioned to watch for any signs of fatigue and consider \"titration study\" in the future to see if cpap is still required at her new weight.     Pneumonia     hx of recurrent pneumonia issues/respiratory infections.     Polycystic ovary syndrome     able to get pregnant, not on meds.     Pulmonary embolism 2009 or so    briefly on wafarin.     Sleep apnea     cpap     Vitamin D deficiency      Patient Active Problem List   Diagnosis     Adipose Tissue Fibrolipoma     Generalized Anxiety Disorder     Urticaria Allergic Due To Envenomation     Major Depression, Recurrent     MTHFR " "mutation     S/P gastric bypass     Left lower quadrant abdominal mass     Hypervitaminosis A     Calculus of ureter     Hydronephrosis with urinary obstruction due to ureteral calculus     Calculus of kidney     Adrenal mass     Current Outpatient Prescriptions   Medication Sig Note     buPROPion (WELLBUTRIN) 100 MG tablet Take 1 tablet (100 mg total) by mouth 2 (two) times a day.      calcium carbonate (CALCIUM ANTACID) 400 mg calcium (1,000 mg) Chew Chew. Take 1 tablet once a day      cholecalciferol, vitamin D3, 1,000 unit tablet Take 2,000 Units by mouth daily.      EPINEPHrine (EPIPEN) 0.3 mg/0.3 mL (1:1,000) atIn As directed      FLUoxetine (PROZAC) 20 MG capsule Take 1 capsule (20 mg total) by mouth daily.      LORazepam (ATIVAN) 0.5 MG tablet Take 0.5 mg by mouth. 2018: Received from: Mary Salas Sig: Take 1 tablet (0.5 mg) by mouth every 8 hours as needed for anxiety     MULTIVIT,CALC,MINS/IRON/FOLIC (ONE-A-DAY WOMENS FORMULA ORAL) Take by mouth. Take 1 tablet once a day      spironolactone (ALDACTONE) 25 MG tablet Take 1 tablet (25 mg total) by mouth daily.        Past Surgical History  She has a past surgical history that includes  section, classic; Tubal ligation; Apex tooth extraction (Bilateral); pr lap gastric bypass/elly-en-y (N/A, 2016); and pr cysto/uretero w/lithotripsy &indwell stent insrt (Left, 2018).     Examination   BP (!) 147/96 (Patient Site: Right Arm, Patient Position: Sitting, Cuff Size: Adult Regular)  Pulse 88  Ht 5' 4\" (1.626 m)  Wt 158 lb 9.6 oz (71.9 kg)  SpO2 99%  Breastfeeding? No  BMI 27.22 kg/m2  Height: 5' 4\" (1.626 m) (2018  9:12 AM)  Initial Weight: 230 lbs (2018  9:12 AM)  Weight: 158 lb 9.6 oz (71.9 kg) (2018  9:12 AM)  Weight loss from initial: 71.4 (2018  9:12 AM)  % Weight loss: 31.04 % (2018  9:12 AM)  BMI (Calculated): 27.2 (2018  9:12 AM)  SpO2: 99 % (2018  9:12 AM)  Heart Rate (/min): 72 (2017 " 12:26 PM)  BP (mmHg): 136/76 (7/14/2017 12:26 PM)  Waist Circumference (In): 34.75 Inches (7/14/2017 12:26 PM)  Hip Circumference (In): 37.5 Inches (7/14/2017 12:26 PM)  Neck Circumference (In): 12.5 Inches (7/14/2017 12:26 PM)  NSAIDS: No (3/14/2018 10:21 AM)  General:  Alert and ambulatory,   HEENT:  No conjunctival pallor, moist mucous Membranes, neck is thin. No buffalo hump evident.   Pulmonary:  Normal respiratory effort, no cough, no audible wheezes/crackles.  CV:  Regular rate and Rhythm, no murmurs, pulses 2 plus  Abdominal: silver striae. Non tender. Lap RNY incisions well healed.  Extremities: no tremor or edema.   Skin:  No pallor or jaundice  Pscyh/Mood: has been stressed with recent diagnosis and plan for surgery at the end of June at [facility].          Counseling:   We reviewed the important post op bariatric recommendations:  -eating 3 meals daily  -eating protein first, getting >60gm protein daily  -eating slowly, chewing food well  -avoiding/limiting calorie containing beverages  -drinking water 15-30 minutes before or after meals  -limiting restaurant or cafeteria eating to twice a week or less    We discussed the importance of restorative sleep and stress management in maintaining a healthy weight.  We discussed the National Weight Control Registry healthy weight maintenance strategies and ways to optimize metabolism.  We discussed the importance of physical activity including cardiovascular and strength training in maintaining a healthier weight.  We discussed the importance of life-long vitamin supplementation and life-long  follow-up.    Suzy was reminded that, to avoid marginal ulcers she should avoid tobacco at all, alcohol in excess, caffeine in excess, and NSAIDS (unless indicated for cardioprotection or othewise and opposed by a PPI).    At least 25 minutes was spent in direct consultation and over 50% of the time devoted to counseling regarding maximizing the benefits of her previous  bariatric surgery while minimizing risks of nutritional or structural complications.    Home Chase MD  [facility].  6/8/2018  9:25 AM

## 2021-06-18 NOTE — PROGRESS NOTES
Assessment/Plan:       1. Adrenal mass (H)  Patient is scheduled for right adrenalectomy and lymph node dissection, possible liver embolization on 6/25/18.  She has follow-up scheduled with urology and oncology.    2. Generalized anxiety disorder  Patient reports she is doing reasonably well on her current dose of Prozac.  She does not desire any changes at present.  - FLUoxetine (PROZAC) 20 MG capsule; Take 1 capsule (20 mg total) by mouth daily.  Dispense: 90 capsule; Refill: 3    3. Major depressive disorder, recurrent episode (H)  Patient believes she is coping with all of the current changes in her life reasonably well.  - FLUoxetine (PROZAC) 20 MG capsule; Take 1 capsule (20 mg total) by mouth daily.  Dispense: 90 capsule; Refill: 3    4. Hypertension due to endocrine disorder  Blood pressure is well controlled on spironolactone today.  She reports her blood pressure was elevated at her preop appointment.  No changes made presently.    5. Cushing syndrome due to adrenal disease (H)  This is thought to resolve with adrenalectomy.  Currently on spironolactone.        Subjective:       Suzy Rodríguez is a 32 y.o. female who presents for further follow-up of right adrenal mass and to fill out [facility] paperwork.  Patient has been out of work since 5/8/16.  This is primarily due to depression and anxiety related to worry about the ultimate diagnosis of her right adrenal mass.  She also has been diagnosed with Cushing syndrome and elevated blood pressure related to this mass.  She has been started on spironolactone and tolerates this well.  She experiences some early satiety and abdominal fullness, also fatigue.  She has excessive worry and is unable to think about anything other than her current health issues.  This makes it very difficult for her to concentrate on any of her work duties.  Physically she does not have limitations, but mentally she is unable to concentrate and focus on her work tasks.  She is  "scheduled for surgery on 6/25/18 as above.  She brings in [facility] paperwork and a workability form today.    The following portions of the patient's history were reviewed and updated as appropriate: allergies, current medications, past medical history, past social history and problem list.      Current Outpatient Prescriptions:      amino acids (DAILY AMINO ORAL), Take 325 mg by mouth daily., Disp: , Rfl:      buPROPion (WELLBUTRIN) 100 MG tablet, Take 1 tablet (100 mg total) by mouth 2 (two) times a day., Disp: 180 tablet, Rfl: 0     calcium carbonate (CALCIUM ANTACID) 400 mg calcium (1,000 mg) Chew, Chew. Take 1 tablet once a day, Disp: , Rfl:      cholecalciferol, vitamin D3, 1,000 unit tablet, Take 2,000 Units by mouth daily., Disp: , Rfl:      EPINEPHrine (EPIPEN) 0.3 mg/0.3 mL (1:1,000) atIn, As directed, Disp: , Rfl:      FLUoxetine (PROZAC) 20 MG capsule, Take 1 capsule (20 mg total) by mouth daily., Disp: 90 capsule, Rfl: 0     LORazepam (ATIVAN) 0.5 MG tablet, Take 0.5 mg by mouth., Disp: , Rfl:      MULTIVIT,CALC,MINS/IRON/FOLIC (ONE-A-DAY WOMENS FORMULA ORAL), Take by mouth. Take 1 tablet once a day, Disp: , Rfl:      spironolactone (ALDACTONE) 25 MG tablet, Take 1 tablet (25 mg total) by mouth daily., Disp: 30 tablet, Rfl: 1    Review of Systems   Pertinent items are noted in HPI.      Objective:      /86 (Patient Site: Right Arm, Patient Position: Sitting, Cuff Size: Adult Regular)  Pulse 88  Temp 98.4  F (36.9  C) (Oral)   Resp 16  Ht 5' 4\" (1.626 m)  Wt 158 lb 9 oz (71.9 kg)  LMP 05/10/2018 (Exact Date)  Breastfeeding? No  BMI 27.22 kg/m2    General appearance: alert, appears stated age and cooperative  Psychiatric: Speech is fluent and thought process is linear.  Affect is anxious, mood is described as anxious.      I spent a total of 25 minutes with the patient today, greater than 50% in face-to-face counseling and coordination of care in relation to the above issues and in filling out " FMLA paperwork.

## 2021-06-18 NOTE — PROGRESS NOTES
Assessment/Plan:        Diagnoses and all orders for this visit:    Calculus of ureter  -     Urinalysis Macroscopic  -     Culture, Urine  -     ciprofloxacin HCl tablet 250 mg (CIPRO); Take 1 tablet (250 mg total) by mouth once.  -     Cystoscopy with Stent Removal Education  -     Cancel: CT Abdomen Pelvis Without Oral Without IV Contrast; Future; Expected date: 6/20/18  -     Patient Stated Goal: Prevent further stones  -     Patient Stated Goal: Prevent further stones  -     Cancel: 24 Hour Urine Collection Steps Education  -     Cancel: Stone Formation, 24 Hour Urine x2; Standing  -     Cancel: Renal Function Profile; Future; Expected date: 6/4/18  -     Cancel: Uric Acid; Future; Expected date: 6/4/18  -     Cancel: Magnesium; Future; Expected date: 6/4/18  -     Cancel: Calcium, Ionized, Measured; Future; Expected date: 6/4/18  -     oxyCODONE (ROXICODONE) 5 MG immediate release tablet; Take 1 tablet (5 mg total) by mouth every 4 (four) hours as needed for pain. 1-2 tabs  Dispense: 24 tablet; Refill: 0    Calculus of kidney  -     Cystoscopy with Stent Removal Education  -     Cancel: CT Abdomen Pelvis Without Oral Without IV Contrast; Future; Expected date: 6/20/18  -     Patient Stated Goal: Prevent further stones  -     Patient Stated Goal: Prevent further stones  -     Cancel: 24 Hour Urine Collection Steps Education  -     Cancel: Stone Formation, 24 Hour Urine x2; Standing  -     Cancel: Renal Function Profile; Future; Expected date: 6/4/18  -     Cancel: Uric Acid; Future; Expected date: 6/4/18  -     Cancel: Magnesium; Future; Expected date: 6/4/18  -     Cancel: Calcium, Ionized, Measured; Future; Expected date: 6/4/18  -     oxyCODONE (ROXICODONE) 5 MG immediate release tablet; Take 1 tablet (5 mg total) by mouth every 4 (four) hours as needed for pain. 1-2 tabs  Dispense: 24 tablet; Refill: 0    Urinary calculus  -     Cystoscopy with Stent Removal Education  -     Cancel: CT Abdomen Pelvis  Without Oral Without IV Contrast; Future; Expected date: 6/20/18  -     Patient Stated Goal: Prevent further stones  -     Patient Stated Goal: Prevent further stones  -     Cancel: 24 Hour Urine Collection Steps Education  -     Cancel: Stone Formation, 24 Hour Urine x2; Standing  -     Cancel: Renal Function Profile; Future; Expected date: 6/4/18  -     Cancel: Uric Acid; Future; Expected date: 6/4/18  -     Cancel: Magnesium; Future; Expected date: 6/4/18  -     Cancel: Calcium, Ionized, Measured; Future; Expected date: 6/4/18  -     oxyCODONE (ROXICODONE) 5 MG immediate release tablet; Take 1 tablet (5 mg total) by mouth every 4 (four) hours as needed for pain. 1-2 tabs  Dispense: 24 tablet; Refill: 0    Other orders  -     ciprofloxacin HCl (CIPRO) 250 MG tablet;       Stone Management Plan  [facility] Stone Management 5/9/2018 5/21/2018   Urinary Tract Infection No suspicion of infection No suspicion of infection   Renal Colic Well controlled symptoms Well controlled symptoms   Renal Failure No suspicion of renal failure No suspicion of renal failure   Current CT date 5/8/2018 -   Right sided stones? Yes -   R Number of ureteral stones No ureteral stones -   R Number of kidney stones  1 -   R GSD of kidney stones < 2 -   R Hydronephrosis None -   R Stone Event No current event No current event   R Current Plan Observe -   Observe rationale Limited stone burden with good prognosis for spontaneous passage -   Left sided stones? Yes -   L Number of ureteral stones 1 -   L GSD of ureteral stones 6 -   L Location of ureteral stone Proximal -   L Number of kidney stones  2 -   L GSD of kidney stones 4 - 10 -   L Hydronephrosis Mild -   L Stone Event New event -   Diagnosis date 5/8/2018 -   Initial location of primary symptomatic stone Proximal -   Initial GSD of primary symptomatic stone 6 -   Post-op status - Stent Removal   L Current Plan Clear -   Clear rationale Optimally managed electively -             Subjective:  "     HPI  Ms. Suzy Rodríguez is a 32 y.o.  female returning to the [facility] for stent removal postop left laser lithotripsy for proximal and left lower pole stone.  Stone analysis was 50% calcium oxalate 50% calcium phosphate appetite.  Patient has a known 2 mm stone in the right kidney and there was a 1 mm stone in left upper pole by CT.  The 2 larger stones have been removed.  Patient had history of stone prior to her Tanika-en-Y at age 22    CT showed large 9 cm mass felt to be adrenal which is being evaluated at the [facility] this week.    She comes at this time for stent removal with symptoms of urgency frequency and some mild back discomfort.    UA today specific gravity 1.020 pH 6.5 large blood negative nitrate trace leukocytes.    Flexible cystourethroscopy accomplished with stent removed without difficulty.    Impression postop left laser lithotripsy proximal left lower pole stone stent now out  Stone analysis as above  9 cm presumed adrenal mass right    Plan follow-up 4 weeks with CT w/o contrast if patient available  Patient informed if treatment of the adrenal is ongoing follow-up with [facility] could be put off .2           ROS   Review of systems is negative except for HPI.    Past Medical History:   Diagnosis Date     Anemia     thought due to heavy menses.     Clotting disorder      Depression      GERD (gastroesophageal reflux disease)      Kidney stones     passed on their own     Menstrual disorder     menorrhagia     MTHFR mutation     believed to be homozygous for the mutation.     SHREYA on CPAP 12/17/2015    Stopped using CPAP the Fall of 2016 after weight loss as even at lower pressure/adjustment couldn't tolerate the cpap. Resting well, cautioned to watch for any signs of fatigue and consider \"titration study\" in the future to see if cpap is still required at her new weight.     Pneumonia     hx of recurrent pneumonia issues/respiratory infections. "     Polycystic ovary syndrome     able to get pregnant, not on meds.     Pulmonary embolism  or so    briefly on wafarin.     Sleep apnea     cpap     Vitamin D deficiency        Past Surgical History:   Procedure Laterality Date      SECTION, CLASSIC      x2     OK CYSTO/URETERO W/LITHOTRIPSY &INDWELL STENT INSRT Left 2018    Procedure: CYSTOSCOPY, LEFT URETEROSCOPY LASER LITHOTRIPSY STENT INSERTION;  Surgeon: Skyler Delvalle MD;  Location: [facility];  Service: Urology     OK LAP GASTRIC BYPASS/DERICK-EN-Y N/A 2016    Procedure: GASTRIC BYPASS LAPAROSCOPIC DERICK-EN-Y;  Surgeon: Randy Sutherland MD;  Location: [facility];  Service: General     TUBAL LIGATION       WISDOM TOOTH EXTRACTION Bilateral        Current Outpatient Prescriptions   Medication Sig Dispense Refill     buPROPion (WELLBUTRIN) 100 MG tablet Take 1 tablet (100 mg total) by mouth 2 (two) times a day. 180 tablet 0     calcium carbonate (CALCIUM ANTACID) 400 mg calcium (1,000 mg) Chew Chew. Take 1 tablet once a day       EPINEPHrine (EPIPEN) 0.3 mg/0.3 mL (1:1,000) atIn As directed       FLUoxetine (PROZAC) 20 MG capsule Take 1 capsule (20 mg total) by mouth daily. 90 capsule 0     MULTIVIT,CALC,MINS/IRON/FOLIC (ONE-A-DAY WOMENS FORMULA ORAL) Take by mouth. Take 1 tablet once a day       ondansetron (ZOFRAN-ODT) 4 MG disintegrating tablet Place 4 mg under the tongue.       oxyCODONE (ROXICODONE) 5 MG immediate release tablet Take 1-2 tablets (5-10 mg total) by mouth every 4 (four) hours as needed. 30 tablet 0     tamsulosin (FLOMAX) 0.4 mg Cp24 Take 1 capsule (0.4 mg total) by mouth daily for 14 days. With meal 14 capsule 1     tolterodine (DETROL) 2 MG tablet Take 1 tablet (2 mg total) by mouth 2 (two) times a day for 14 days. Start postoperatively 14 tablet 1     cholecalciferol, vitamin D3, 1,000 unit tablet Take 2,000 Units by mouth daily.       No current facility-administered medications for this visit.         Allergies   Allergen Reactions     Coconut Oil Hives     Ibuprofen Hives and Swelling     Venom-Honey Bee Swelling       Social History     Social History     Marital status:      Spouse name: N/A     Number of children: N/A     Years of education: N/A     Occupational History     Management      Social History Main Topics     Smoking status: Never Smoker     Smokeless tobacco: Never Used     Alcohol use Yes      Comment: about 1 drink per week     Drug use: No     Sexual activity: No      Comment:  Ashvin     Other Topics Concern     Not on file     Social History Narrative       Family History   Problem Relation Age of Onset     Diabetes Mother      Diabetes Father      Hypertension Father      COPD Father      Cancer Maternal Grandmother      gastric     Urolithiasis Maternal Grandmother      Heart disease Maternal Grandfather      Cancer Maternal Grandfather      lung     Heart disease Paternal Grandfather      Breast cancer Neg Hx      Colon cancer Neg Hx      Prostate cancer Neg Hx      Stroke Neg Hx      Clotting disorder Neg Hx      Gout Neg Hx      Objective:      Physical Exam  Vitals:    05/21/18 0812   BP: 145/90   Pulse: 96   Temp: 98.3  F (36.8  C)     General - well developed, well nourished, appropriate for age. Appears no distress at this time  Abdomen - moderately obese soft, non-tender, no hepatosplenomegaly, no masses.   - left flank  mild tenderness, no suprapubic tenderness, kidney and bladder non-palpable  MSK - normal spinal curvature. no spinal tenderness. normal gait. muscular strength intact.  Psych - oriented to time, place, and person, normal mood and affect.      Labs   Urinalysis POC (Office):  Nitrite, UA   Date Value Ref Range Status   05/21/2018 Negative Negative Final   05/09/2018 Negative Negative Final   09/21/2016 Negative Negative Final       Lab Urinalysis:  Blood, UA   Date Value Ref Range Status   05/21/2018 Large (!) Negative Final   05/09/2018 Moderate  (!) Negative Final   09/21/2016 Large (!) Negative Final     Nitrite, UA   Date Value Ref Range Status   05/21/2018 Negative Negative Final   05/09/2018 Negative Negative Final   09/21/2016 Negative Negative Final     Leukocytes, UA   Date Value Ref Range Status   05/21/2018 Trace (!) Negative Final   05/09/2018 Trace (!) Negative Final   09/21/2016 Large (!) Negative Final     pH, UA   Date Value Ref Range Status   05/21/2018 6.5 5.0 - 8.0 Final   05/09/2018 6.5 5.0 - 8.0 Final   09/21/2016 7.5 4.5 - 8.0 Final

## 2021-06-18 NOTE — PROGRESS NOTES
Patient educated regarding stent removal procedure and possible symptoms after removal.  Patient voiced understanding of information.  Handout given to patient.  Consent form signed.     [facility] Timeout    Correct patient?: Yes  Correct site?:  Yes  Correct procedure?:  Yes  Correct laterality?:  Left  Consents verified?:  Yes  Relevant lab results available?:  Yes

## 2021-06-19 NOTE — PROGRESS NOTES
1. Adrenal cortical adenocarcinoma, unspecified laterality (H)     2. Flank pain  Urinalysis-UC if Indicated    Culture, Urine      Plan: Her urine turned out to show no sign of infection, although we will send it for culture and let her know if something grows out of it.  Is some moderate blood there, which could be possibly from stone, or from some residual to her surgery.  She does have a call into her urologic surgeon at the [facility], and we will wait for his call to make further plans according to that.  I did give her some Zofran today that she can use up to every 6 hours as needed for nausea.  I think a great deal of her discomfort is from the fact that she has not been eating and drinking very much over the last several days.  She has not been vomiting, but she has been pretty sick and not eating.  I offered her to go to the emergency room to get some IV fluids, but she wishes to hold off on that, go home and see if she can get herself rehydrated.  I asked her to follow-up with Dr. Garzon or myself in the next couple of days at least by phone if not in person to see how she is doing.  Sooner if she is worse.    Subjective: 32-year-old female is here today for some flank pain and some nausea since the last couple of days.  Her history goes back a little further back to the middle of June where she had a kidney stone in the course of the workup for that, they found a an adrenal cortical carcinoma seen on CT scan.  This letter down the road of seeing a surgeon at the [facility] and eventually having that removed by anterior approach on June 25, 2018.  She has struggled since then with postoperative nausea and anorexia in just not feeling well.  She has some flank pain occasionally, and they are always concerned about more kidney stones.  She is also wanting today she might have a UTI because she has had this flank pain and some wraparound pain on that right side.  She has had some low-grade temperatures up  to 98.2 or 99.5 at the worst.  She has had some diarrhea along with all of this as well.    She is not been eating or drinking very much over the last several days, she just is nauseated and has not had any more Zofran that she has run out of it.  The pain pills helped somewhat with the pain but she still has quite a bit of pain.    Objective mildly ill-appearing female but in no acute distress.  Vital signs as noted.  The  notes that her blood pressure is lower than it has been since she was diagnosed.  Chest clear to auscultation.  Heart regular rate and rhythm abdomen soft wound looks good.  Bowel sounds present in all quadrants.  She does not appear dehydrated, and she does not have bad color.

## 2021-06-19 NOTE — LETTER
Letter by Lia Arrington RD at      Author: Lia Arrington RD Service: -- Author Type: --    Filed:  Encounter Date: 10/18/2019 Status: Signed         Suzy Rodríguez  [id number] [facility] [address]               October 18, 2019      Dear Suzy:    We are sorry that you missed your appointment with Lia Arrington on 10/18/2019. Your health and follow-up medical care are important to us. Please call our office as soon as possible so that we may reschedule your appointment. If you have already rescheduled your appointment, please disregard this letter.    Sincerely,        JOSH Hernandez

## 2021-06-19 NOTE — PROGRESS NOTES
Assessment/Plan:       1. Moderate episode of recurrent major depressive disorder (H)  Patient is currently following with a counselor and is also attending a support group for patients with cancer.  She finds these helpful.  She continues on Wellbutrin and fluoxetine.  Plan follow-up in about 4 weeks.    2. Generalized anxiety disorder  Patient has lorazepam that she has been taking occasionally.  She can take 0.5-1 mg as needed to prevent panic.  Also added gabapentin for her to take at bedtime.  Hopefully this will improve her ability to sleep and also her restless legs symptoms.  We could consider doing this during the day if it works well.    3. 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  She is currently undergoing chemotherapy and plans to start radiation in the near future.        Subjective:       Suzy Rodríguez is a 32 y.o. female who presents for a discussion of increased depression and anxiety along with trouble sleeping.  She states that since her diagnosis of adrenal carcinoma, she has had worsening of both depression and anxiety.  She primarily feels that her depression outweighs her anxiety, but her PHQ-9 score is 18 and her KIMBERLY-7 score is 19.  She has fatigue, likely related to her mitotane she says.  She also feels down much of the time, has little interest in things she previously enjoyed.  In terms of anxiety, she feels anxious much of the time, has excessive worry and trouble relaxing.  She states that her mitotane in the evening has made it difficult for her to fall asleep.  She also has symptoms of restless legs.  She has joined a group through her [facility] for people with cancer.  She finds this beneficial.  She is also seeing a therapist.    The following portions of the patient's history were reviewed and updated as appropriate: allergies, current medications, past medical history, past social history, past surgical history  "and problem list.      Current Outpatient Prescriptions:      amino acids (DAILY AMINO ORAL), Take 325 mg by mouth daily., Disp: , Rfl:      buPROPion (WELLBUTRIN) 100 MG tablet, Take 1 tablet (100 mg total) by mouth 2 (two) times a day., Disp: 180 tablet, Rfl: 0     calcium carbonate (CALCIUM ANTACID) 400 mg calcium (1,000 mg) Chew, Chew. Take 1 tablet once a day, Disp: , Rfl:      cholecalciferol, vitamin D3, 1,000 unit tablet, Take 2,000 Units by mouth daily., Disp: , Rfl:      EPINEPHrine (EPIPEN) 0.3 mg/0.3 mL (1:1,000) atIn, As directed, Disp: , Rfl:      FLUoxetine (PROZAC) 20 MG capsule, Take 1 capsule (20 mg total) by mouth daily., Disp: 90 capsule, Rfl: 3     hydrocortisone (CORTEF) 10 MG tablet, Take 30 mg by mouth every morning. , Disp: , Rfl:      hydrocortisone (CORTEF) 20 MG tablet, Take 20 mg by mouth daily. In the afternoon, Disp: , Rfl:      LORazepam (ATIVAN) 0.5 MG tablet, Take 0.5 mg by mouth., Disp: , Rfl:      mitotane (LYSODREN) 500 mg tablet, Take by mouth 3 (three) times a day. , Disp: , Rfl:      MULTIVIT,CALC,MINS/IRON/FOLIC (ONE-A-DAY WOMENS FORMULA ORAL), Take by mouth. Take 1 tablet once a day, Disp: , Rfl:      ondansetron (ZOFRAN) 4 MG tablet, Take 1 tablet (4 mg total) by mouth every 6 (six) hours as needed for nausea., Disp: 30 tablet, Rfl: 1     oxyCODONE (ROXICODONE) 10 mg immediate release tablet, Take 1 tablet (10 mg total) by mouth every 4 (four) hours as needed for pain., Disp: 20 tablet, Rfl: 0    Review of Systems   Pertinent items are noted in HPI.      Objective:      /70 (Patient Site: Right Arm, Patient Position: Sitting, Cuff Size: Adult Regular)  Pulse 84  Resp 16  Ht 5' 4\" (1.626 m)  Wt 157 lb 6.4 oz (71.4 kg)  Breastfeeding? No  BMI 27.02 kg/m2    General appearance: alert, appears stated age and cooperative  Lungs: clear to auscultation bilaterally  Heart: regular rate and rhythm, S1, S2 normal, no murmur, click, rub or gallop  Psychiatric: Speech is " fluent and thought processes linear, affect is flat, mood is described as depressed

## 2021-06-19 NOTE — PROGRESS NOTES
Assessment/ Plan     1. Hospital discharge follow-up  Patient was admitted at the [facility] from 6/25 through 7/1 for removal of an adrenal mass.  Unfortunately, the pathology came back as and renal cell carcinoma.  She has follow-up with both the surgeon and endocrinologist next week.  Her incision is healing well.  She did have some blood loss anemia with a hemoglobin around 7.1 at discharge.  She also had a small pneumothorax.  Patient requesting a refill of oxycodone.  I gave her 20 tablets and we discussed only using the sparingly as it can be habit-forming.  - Hemoglobin    2. Adrenal gland cancer (H)      3. Anemia, blood loss  We will recheck a hemoglobin today.  Advised patient to start an iron supplement.  Discussed this could very well be the reason for her ongoing fatigue and mild shortness of breath.    4. Insomnia  Would recommend that she try melatonin over-the-counter.  She is currently taking a .5 mg of Ativan at bedtime but then wakes up at 2-3.  She could take a second dose at that time if she would like.      Subjective:       Suzy Rodríguez is a 32 y.o. female who presents for hospital follow-up.  This patient typically sees Dr. Gillian Garzon.  She comes in today for hospital follow-up.  She was found to have an adrenal mass on CT for a kidney stone.  She was also having Cushing like symptoms.  She was admitted at the [facility] to have the tumor removed.  She was there from June 25 - July 1.  She tolerated the surgery well.  She did have some postop anemia.  They were also following a pleural effusion and a pneumothorax.  These were stable.  She states that she still feeling quite fatigued and quite sore.  She was given 20 oxycodone, but is out of them.  She is trying to use Tylenol and ibuprofen and only using oxycodone when needed.  She does feel very fatigued and sometimes short of breath.  She is not having a cough or fever.  She does have follow-up  scheduled with both her surgeon in the endocrinologist.  Unfortunately, her pathology came back as renal cell carcinoma.  She does not know what sort of follow-up she needs for that at this time.  She is having some insomnia.  She states she will take at Ativan at bedtime and that helps her fall asleep but then she is waking up at 2:58 in the morning.  She typically gets out of bed between 8 or 9 in the morning.  Would recommend that she try melatonin.  Could also take a second Ativan if she has at least several hours to sleep before she has to get out of bed.  She feels like she has a good support system currently.    Relevant past medical, family, surgical, and social history reviewed with patient, unless noted in HPI, not pertinent for this visit.    Review of Systems   A 12 point comprehensive review of systems was negative except as noted.      Current Outpatient Prescriptions   Medication Sig Dispense Refill     buPROPion (WELLBUTRIN) 100 MG tablet Take 1 tablet (100 mg total) by mouth 2 (two) times a day. 180 tablet 0     calcium carbonate (CALCIUM ANTACID) 400 mg calcium (1,000 mg) Chew Chew. Take 1 tablet once a day       cholecalciferol, vitamin D3, 1,000 unit tablet Take 2,000 Units by mouth daily.       enoxaparin (LOVENOX) 40 mg/0.4 mL syringe Inject 40 mg under the skin.       FLUoxetine (PROZAC) 20 MG capsule Take 1 capsule (20 mg total) by mouth daily. 90 capsule 3     hydrocortisone (CORTEF) 10 MG tablet Take 10 mg by mouth.       hydrocortisone (CORTEF) 20 MG tablet Take 20 mg by mouth.       LORazepam (ATIVAN) 0.5 MG tablet Take 0.5 mg by mouth.       MULTIVIT,CALC,MINS/IRON/FOLIC (ONE-A-DAY WOMENS FORMULA ORAL) Take by mouth. Take 1 tablet once a day       amino acids (DAILY AMINO ORAL) Take 325 mg by mouth daily.       EPINEPHrine (EPIPEN) 0.3 mg/0.3 mL (1:1,000) atIn As directed       oxyCODONE (ROXICODONE) 10 mg immediate release tablet Take 1 tablet (10 mg total) by mouth every 4 (four) hours  "as needed for pain. 20 tablet 0     spironolactone (ALDACTONE) 25 MG tablet Take 1 tablet (25 mg total) by mouth daily. 30 tablet 1     No current facility-administered medications for this visit.        Objective:      /76  Pulse (!) 104  Temp 99  F (37.2  C) (Oral)   Resp 16  Ht 5' 4\" (1.626 m)  Wt 157 lb (71.2 kg)  BMI 26.95 kg/m2      General appearance: alert, appears stated age and cooperative  Lungs: clear to auscultation bilaterally  Heart: regular rate and rhythm, S1, S2 normal, no murmur, click, rub or gallop  Abdomen: soft, non-tender; bowel sounds normal; no masses,  no organomegaly, vertical surgical incision from sternum to pubic bone with staples in place.  The staples look a little irritated, but the incision is healing well.      Recent Results (from the past 168 hour(s))   Hemoglobin   Result Value Ref Range    Hemoglobin 8.4 (L) 12.0 - 16.0 g/dL          This note has been dictated using voice recognition software. Any grammatical or context distortions are unintentional and inherent to the software  "

## 2021-06-19 NOTE — PROGRESS NOTES
Assessment and Plan     Suzy was seen today for dysuria.    Diagnoses and all orders for this visit:    Dysuria  -     Urinalysis-UC if Indicated  -     Cancel: Culture, Urine  -     Urine,Microscopic         HPI     Chief Complaint   Patient presents with     Dysuria     started last night       Suzy Rodríguez is a 32 y.o. female seen today for 1 day of increased urinary frequency with dysuria.  Dysuria was diminished this morning and absent when she left a urine sample for us in the clinic today.  At the beginning of this week she started a new antineoplastic medication for her adrenal carcinoma.  She is concerned that this medication may be causing her symptoms. She denies fevers, chills, nausea, back/flank pain.        Current Outpatient Prescriptions:      amino acids (DAILY AMINO ORAL), Take 325 mg by mouth daily., Disp: , Rfl:      buPROPion (WELLBUTRIN) 100 MG tablet, Take 1 tablet (100 mg total) by mouth 2 (two) times a day., Disp: 180 tablet, Rfl: 0     calcium carbonate (CALCIUM ANTACID) 400 mg calcium (1,000 mg) Chew, Chew. Take 1 tablet once a day, Disp: , Rfl:      cholecalciferol, vitamin D3, 1,000 unit tablet, Take 2,000 Units by mouth daily., Disp: , Rfl:      EPINEPHrine (EPIPEN) 0.3 mg/0.3 mL (1:1,000) atIn, As directed, Disp: , Rfl:      FLUoxetine (PROZAC) 20 MG capsule, Take 1 capsule (20 mg total) by mouth daily., Disp: 90 capsule, Rfl: 3     hydrocortisone (CORTEF) 10 MG tablet, Take 10 mg by mouth., Disp: , Rfl:      hydrocortisone (CORTEF) 20 MG tablet, Take 20 mg by mouth., Disp: , Rfl:      LORazepam (ATIVAN) 0.5 MG tablet, Take 0.5 mg by mouth., Disp: , Rfl:      mitotane (LYSODREN) 500 mg tablet, Take by mouth 4 (four) times a day., Disp: , Rfl:      MULTIVIT,CALC,MINS/IRON/FOLIC (ONE-A-DAY WOMENS FORMULA ORAL), Take by mouth. Take 1 tablet once a day, Disp: , Rfl:      ondansetron (ZOFRAN) 4 MG tablet, Take 1 tablet (4 mg total) by mouth every 6 (six) hours as needed for  nausea., Disp: 30 tablet, Rfl: 1     oxyCODONE (ROXICODONE) 10 mg immediate release tablet, Take 1 tablet (10 mg total) by mouth every 4 (four) hours as needed for pain., Disp: 20 tablet, Rfl: 0     enoxaparin (LOVENOX) 40 mg/0.4 mL syringe, Inject 40 mg under the skin., Disp: , Rfl:      Reviewed and updated: medical history, medications and allergies.     Review of Systems     General: Denies fever, chills, fatigue.  GI: Denies nausea, vomiting, diarrhea, constipation.  : Urinary frequency and dysuria yesterday.  Mild dysuria this morning, resolving this afternoon.     Objective     Vitals:    07/28/18 1339   BP: 114/76   Patient Site: Right Arm   Patient Position: Sitting   Cuff Size: Adult Regular   Pulse: 90   Resp: 16   Temp: 98.5  F (36.9  C)   TempSrc: Oral   SpO2: 100%   Weight: 159 lb 1 oz (72.2 kg)        Reviewed vital signs.  General: Appears calm, comfortable. Answers questions quickly and appropriately with clear speech. No apparent distress.  Skin: Pink, warm, dry.  HENT: Normocephalic, atraumatic.  Neck: Supple.  Cardiovascular: Strong, regular radial pulse.  Respiratory: Normal respiratory effort.  Abdomen: No CVA tenderness to percussion.  No suprapubic tenderness.  Neuro: Memory and cognition appear normal. Normal gait.  Psych: Mood and affect appear normal.     Imaging:   No results found.    Labs:  Recent Results (from the past 24 hour(s))   Urinalysis-UC if Indicated   Result Value Ref Range    Color, UA Yellow Colorless, Yellow, Straw, Light Yellow    Clarity, UA Clear Clear    Glucose, UA Negative Negative    Bilirubin, UA Negative Negative    Ketones, UA Negative Negative    Specific Gravity, UA 1.015 1.005 - 1.030    Blood, UA Negative Negative    pH, UA 7.0 5.0 - 8.0    Protein, UA Negative Negative mg/dL    Urobilinogen, UA 0.2 E.U./dL 0.2 E.U./dL, 1.0 E.U./dL    Nitrite, UA Negative Negative    Leukocytes, UA Negative Negative   Urine,Microscopic   Result Value Ref Range    Bacteria,  UA None Seen None Seen hpf    RBC, UA None Seen None Seen, 0-2 hpf    WBC, UA 0-5 None Seen, 0-5 hpf    Squam Epithel, UA 5-10 (!) None Seen, 0-5 lpf        Medical Decision-Making     Suzy is a well-appearing 32-year-old female with a history of adrenal carcinoma for which she is currently being treated.  She presents with dysuria and increased urinary frequency since yesterday.  Her symptoms do seem to be improving or fully resolved today.  She started a new antineoplastic medication on Monday which lists hemorrhagic cystitis and hematuria as possible side effects.  UA today was completely clean with no evidence of pyuria, bacteriuria, or hematuria.  While I am generally inclined to treat UTIs more based on the symptoms than on the status of the urine, a completely clean UA is pretty convincing that no UTI is present.  It does seem likely to me that a medication that can cause a hemorrhagic cystitis could also cause UTI-like symptoms.  She will try OTC phenazopyridine this weekend and she sees her oncologist on Monday.  She will discuss her symptoms with him.    Reviewed red flags that would trigger a prompt return to the clinic as noted below under patient instructions.  She expressed understanding of these directions and is in agreement with the plan.     Patient Instructions     Patient Instructions   There are no signs of infection on your urinalysis or when they looked at the urine under the microscope.    I think the most likely explanation for your discomfort is irritation from the mitotane.  Please discuss this with your oncologist next week.    If your symptoms are persisting into next week, please discuss them with your regular doctor.    Azo (phenazopyridine) can be helpful to treat pain in your bladder.  It should work just as well and chemical irritation as it does on irritation from a bladder infection.    Please return to the clinic if you notice any of the following:    Fever / chills.    Back  or flank pain.    Nausea.            Discussed benefit vs risk of medications, dosing, side effects.  Patient was able to verbalize understanding.  After visit summary was provided for patient.     Fito Barajas PA-C

## 2021-06-19 NOTE — PROGRESS NOTES
Assessment/Plan:       1. 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  Currently taking daily chemo and will start radiation in near future.  Patient came in today to fill out disability paperwork related to the below diagnoses.  She plans 12 weeks off to start with, then will reassess how she is feeling on chemo and radiation.  Chemo will continue for ideally 5 years if she tolerates.    2. Adrenal insufficiency (H)  Follows with Endocrinology at [place].  Taking hydrocortisone daily and has instructions for use of rescue IM steroids PRN illness.    3. Cushing's syndrome (H)  Resolved with adrenalectomy.    4. Moderate episode of recurrent major depressive disorder (H)  Continues on fluoxetine.      5. Generalized anxiety disorder  Continues on wellbutrin and fluoxetine.  Seeing a therapist.  - buPROPion (WELLBUTRIN) 100 MG tablet; Take 1 tablet (100 mg total) by mouth 2 (two) times a day.  Dispense: 180 tablet; Refill: 0    6. Neoplastic malignant related fatigue  Patient notes intense fatigue and generalized body aches since starting mitotane.        Subjective:       Suzy Rodríguez is a 32 y.o. female who presents for a visit to fill out disability paperwork.  Patient was recently diagnosed with a large right adrenal gland tumor and this was resected on 6/25/18.  Prior to resection, patient had Cushing syndrome and hypertension related to a functional tumor.  Upon resection, she now has adrenal insufficiency and is seeing Endocrinology regularly and taking hydrocortisone orally.  She is also following with Oncology and has started chemotherapy and has plans to start radiation.  Patient reports significant depression and anxiety related to her recent diagnosis.  She is following with a therapist and has spousal and family support, is grieving normally and processing information well.  She is unsure how much time off  "she will need, but would like to establish a regimen of treatment and see how she tolerates this, also would like some time to emotionally process her new diagnosis and prognosis.      The following portions of the patient's history were reviewed and updated as appropriate: allergies, current medications, past medical history, past surgical history and problem list.      Current Outpatient Prescriptions:      amino acids (DAILY AMINO ORAL), Take 325 mg by mouth daily., Disp: , Rfl:      buPROPion (WELLBUTRIN) 100 MG tablet, Take 1 tablet (100 mg total) by mouth 2 (two) times a day., Disp: 180 tablet, Rfl: 0     calcium carbonate (CALCIUM ANTACID) 400 mg calcium (1,000 mg) Chew, Chew. Take 1 tablet once a day, Disp: , Rfl:      cholecalciferol, vitamin D3, 1,000 unit tablet, Take 2,000 Units by mouth daily., Disp: , Rfl:      EPINEPHrine (EPIPEN) 0.3 mg/0.3 mL (1:1,000) atIn, As directed, Disp: , Rfl:      FLUoxetine (PROZAC) 20 MG capsule, Take 1 capsule (20 mg total) by mouth daily., Disp: 90 capsule, Rfl: 3     hydrocortisone (CORTEF) 10 MG tablet, Take 10 mg by mouth., Disp: , Rfl:      hydrocortisone (CORTEF) 20 MG tablet, Take 30 mg by mouth. , Disp: , Rfl:      LORazepam (ATIVAN) 0.5 MG tablet, Take 0.5 mg by mouth., Disp: , Rfl:      mitotane (LYSODREN) 500 mg tablet, Take by mouth 4 (four) times a day., Disp: , Rfl:      MULTIVIT,CALC,MINS/IRON/FOLIC (ONE-A-DAY WOMENS FORMULA ORAL), Take by mouth. Take 1 tablet once a day, Disp: , Rfl:      ondansetron (ZOFRAN) 4 MG tablet, Take 1 tablet (4 mg total) by mouth every 6 (six) hours as needed for nausea., Disp: 30 tablet, Rfl: 1     oxyCODONE (ROXICODONE) 10 mg immediate release tablet, Take 1 tablet (10 mg total) by mouth every 4 (four) hours as needed for pain., Disp: 20 tablet, Rfl: 0    Review of Systems   Pertinent items are noted in HPI.      Objective:      /68 (Patient Site: Left Arm, Patient Position: Sitting, Cuff Size: Adult Regular)  Ht 5' 4\" " (1.626 m)  Wt 157 lb 14.4 oz (71.6 kg)  Breastfeeding? No  BMI 27.1 kg/m2    General appearance: alert, appears stated age and cooperative  Due to the nature of the visit, no further examination is performed today.      I spent a total of 10 minutes with the patient today, entirely in face-to-face coordination of care and filling out disability paperwork with her.

## 2021-06-20 NOTE — LETTER
Letter by Gillian Garzon MD at      Author: Gillian Garzon MD Service: -- Author Type: --    Filed:  Encounter Date: 1/13/2020 Status: Signed         January 13, 2020     Patient: Suzy Rodríguez   YOB: 1985   Date of Visit: 1/13/2020       To Whom It May Concern:    It is my medical opinion that Suzy Rodríguez can safely operate a motor vehicle.  Her previous medical issues that resulted in impairment have been remedied.  These included vision changes and tremor related to her chemotherapy, and her medication has been reduced and these issues are improved.    If you have any questions or concerns, please don't hesitate to call.    Sincerely,        Electronically signed by Gillian Garzon MD

## 2021-06-20 NOTE — PROGRESS NOTES
Pt here for NS.  IV started with good blood return and one liter of NS given to pt per her request. She states she felt better after the first liter and doesn't need a 2nd liter today.  Pt IV DC'd and site covered with guaze and coban.  Pt informed to stop at the  and set up another appt.  Pt left stable.

## 2021-06-20 NOTE — LETTER
Letter by Gillian Garzon MD at      Author: Gillian Garzon MD Service: -- Author Type: --    Filed:  Encounter Date: 10/5/2020 Status: (Other)         Suzy ISABEL Rodríguez  [address]  [facility] [address]      2020      Dear Suzy Rodríguez,   : 1985      This letter is in regards to the appointment that you had scheduled on 10/02/2020  at the [facility] with Dr. Garzon.     The [facility] strives to see all patients in a timely manner and we need your help to achieve this.  The above-mentioned appointment was missed and we do not have record of a cancellation by you.  Whenever possible, we request appointment cancellations at least 72 hours in advance.  This time allows us to offer the appointment to another patient in need.      If you feel you have received this letter in error, or if you need to reschedule this appointment, please call our office so that we may update our records.      Sincerely,    [facility]

## 2021-06-20 NOTE — PROGRESS NOTES
1010 Suzy arrived A&Ox4 ambualtory and stable, confirm she is here for NS. Pt states she is very fatigued, finishing oral chemo Monday 10/15/16. She reports constant nausea, taking antiemetics but not able to eat much.   PIV started w ease, R FA, with good blood return, easily flushed NS. 1 liter of NS given to pt per her request, over 90min+ . She states she felt better after the first liter and doesn't need a 2nd liter today.  Pt IV DC'd and site covered with guaze and coban.  1215 Suzy d'cd A&Ox4 ambulatory and stable, will return Friday for hydration.

## 2021-06-20 NOTE — PROGRESS NOTES
"Post-op Surgical Weight Loss Diet Evaluation     Assessment:  Pt presents for 2 years post-op RD visit, s/p RNY  with Dr. Sutherland. Today we reviewed current eating habits and level of physical activity, and instructed on the changes that are required for successful bariatric outcomes.    Patient Progress: Pt has adrenal carcinoma receiving chemotherapy and radiation therapy. Pt reports having poor appetite, change in taste, fatigue, xerostomia, and unintentional weight loss. Pt trying to avoid processed/artificial foods/flavorers.     Pt's Initial Weight: 230 lbs  Weight: 155 lb 8 oz (70.5 kg)  Weight loss from initial: 74.5  % Weight loss: 32.39 %    Body mass index is 26.69 kg/(m^2).     Concerns: Poor nutrition intake.        Vitamins   Multi Vit with Iron: yes  Calcium Citrate: yes  B12: yes  D3: yes    Do you experience hunger? No   Do you have \"dumping\" syndrome? Pt can't recall     How often? With chemo    With what foods: Pt can't recall   Do you experience any reflux or discomfort with eating? No   -Are you still taking omeprazole? Is this new since you stopped taking it?  Nausea: yes  Vomiting: yes  Diarrhea: yes- Pt takes Imodium when needed  Constipation: no  Hair loss:no    Diet Recall/Time: Pt wakes up 8:00am  Breakfast: 1 cup coffee none or oatmeal with fruit   Am Snack: none   Lunch: none or crackers   Pm snack: sleeps 11am-2pm crackers   Dinner: frozen meal,   HS Snack: none     Estimated protein intake: 0-20 grams    Estimated portion size per meals:1 cup/meal    Incorporation of vegetables, fruits, carbohydrates into diet/meals using   Bariatric \"Plate Method\"   The patient and I discussed the importance of including lean/low fat protein at each meal, including a vegetable/fruit, and limiting carbohydrate intake to less than 25% of plate volume. Always keeping within approved perimeters of post op meal portion sizes according to 3/6/9/12 months post op guidelines.    Healthy Fats:  None     Meals " per week away from home: 2x/week   Recommended limiting eating out to no more than 2x/week.    Meal Duration:15 minutes  Encouraged slowing meal times down, 20-30 minutes, chewing to applesauce consistency.     Fluid-meal separation:  Fluids are not  30min before and 30 minutes after meals.  The patient and I reviewed the anatomy of the bypass and why  fluids from a meal is so important.    Fluid Intake  Water: Pt drinks powerade zero 4-5 bottles (64 oz)  Caffeine: none   Alcohol: none   Carbonation: none   Milk: none   Juice: none     Discussed the importance of adequate hydration after surgery and the goal of 64+ oz of fluid daily.   The patient understands the importance of avoiding all carbonated, caffeinated, and sweetened drinks; and instead choosing 64oz plain water.    Exercise  Nothing d/t chemo/radiation treatment.     Pt's understands that 30-60 minutes of daily activity is an important part of bariatric surgery success.   Encouraged pt to incorporate strength training exercise along with cardiovascular exercise as well, most days of the week.      PES statement:    1.  (NI-5.7.1) Inadequate protein intake related to Food and nutrition related knowledge deficit concerning appropriate amount of protein or Not ready for diet/lifestyle change as evidenced by Estimated intake of protein insufficient to meet requirements.     Intervention    Discussion  1. Discussed nutrition therapy for oncology treatment  2. Encouraged using protein supplement as meal replacement for lunch.   3. Discussed tools to improve appetite and taste, such as adding citrus, avoiding metallic silverware, etc.   4. Discussed meal delivery programs for people with critical illness, such as [employer]   5. Educated on post-op vitamin regimen: Multi Vit + iron 2x/day, calcium citrate 400-600 mg 2x/day, 6064-5562 mcg of Sublingual B-12 daily, and 5000 IU Vitamin D3 daily (MVI and calcium can be taken at the same time  "BID)  6. Reviewed lean protein sources    Instructions  1. Include 15-20gm protein at each meal, along with protein supplement/\"planned protein containing snack\" of 15-30gm protein, to reach goal of 60-80 gm protein daily.  2. Increase fluid intake to 64oz daily: choose plain or calorie/carbonation-free beverages.  3. Incorporate daily structured activity, 30-60 minutes most days of the week  4. Recommended pt to start taking: Multi Vit + iron 2x/day, calcium citrate 400-600 mg 2x/day, 1139-8292 mcg of Sublingual B-12 daily, and 5000 IU Vitamin D3 daily. (MVI and calcium can be taken at the same time)  5. Read food labels more consistently: keeping total fat grams <10, total sugar grams <10, fiber >3gm per serving.  6. Increase vegetable/fruit intake, by having a vegetable or fruit with each meal daily.  7. Practice plate method: 1/2 plate lean/low fat protein source, vegetable/fruit, <25% of plate complex carbohydrates.  8. Separate fluids 30 minutes before/after meal times.  9. Practice eating off of smaller plates/bowls, chewing to applesauce consistency, taking 20-30 minutes to eat in a calm/relaxed environment without distractions of tv/email/cell phone.    Handouts provided:  Nutrition therapy for Oncology  [employer] application   Plant based protein supplements   Lean Protein sources  a  Monitor/Evaluation    Pt to follow up in 1 month  With RD.       Time In: 10:30am  Time Out: 11:15am      ABN signed: Yes      "

## 2021-06-21 NOTE — PROGRESS NOTES
Assessment/Plan:       1. Microcytic anemia  Likely iron deficiency.  Iron studies ordered today to confirm this.  She takes over-the-counter iron supplement once daily currently.  Discussed possibly iron infusions if she cannot tolerate an increased dose by mouth.  Also would consider minimizing her menses with an IUD if her oncologist is amenable given her MTHFR.  Otherwise could consider endometrial ablation.  - Iron and Transferrin Iron Binding Capacity    2. Major depressive disorder, recurrent episode (H)/Generalized anxiety disorder  Patient will try increasing fluoxetine to 40 mg daily.  Discussed that doses higher than this are less likely to be helpful.  Recommended follow-up in 3-6 weeks.  She will continue seeing her therapist through oncology as well.  The following are part of a depression follow up plan for the patient:  mental health care management  - FLUoxetine (PROZAC) 40 MG capsule; Take 1 capsule (40 mg total) by mouth daily.  Dispense: 90 capsule; Refill: 3    4. MTHFR mutation (H)  Patient has never had a blood clot but had several miscarriages and a placental abruption.  Discussed asking her oncologist whether a progesterone-only IUD would be feasible.    5. Non-intractable vomiting with nausea, unspecified vomiting type  Patient utilizes Compazine and medical marijuana for this.  This has been working reasonably well.        Subjective:       Suzy Rodríguez is a 33 y.o. female who presents for follow-up of anemia and mood.  Patient states that she has been found to be anemic at her last several oncology visits.  Her oncologist suggested further workup of possible iron deficiency.  Patient denies any black or bloody stools, denies bloody or coffee-ground emesis.  She continues to vomit 2-3 times daily on average.  She does describe heavy menses that have been monthly and some spotting between menses as well.  She has been taking an over-the-counter iron supplement once daily.  She also  "describes pain at the tops of both of her thighs that seems better if she increases her dose of steroids, for instance when she is ill.  She reports that the gabapentin seems helpful for this and is wondering if she can take an additional dose.  Lastly, she continues with a very depressed and anxious mood.  She recently went to [place] with her family and they had a good time.  She reports that her oldest son is \"having a hard time\" with her diagnosis.  He is seeing a  at his school.  They are not attending any family therapy together.  Patient herself sees a therapist through oncology at the [facility].  She reports this has been going well.    The following portions of the patient's history were reviewed and updated as appropriate: allergies, current medications, past medical history, past social history, past surgical history and problem list.      Current Outpatient Medications:      buPROPion (WELLBUTRIN) 100 MG tablet, Take 1 tablet (100 mg total) by mouth 2 (two) times a day., Disp: 180 tablet, Rfl: 3     calcium carbonate (CALCIUM ANTACID) 400 mg calcium (1,000 mg) Chew, Chew. Take 1 tablet once a day, Disp: , Rfl:      cholecalciferol, vitamin D3, 1,000 unit tablet, Take 2,000 Units by mouth daily., Disp: , Rfl:      EPINEPHrine (EPIPEN) 0.3 mg/0.3 mL (1:1,000) atIn, As directed, Disp: , Rfl:      FLUoxetine (PROZAC) 20 MG capsule, Take 1 capsule (20 mg total) by mouth daily., Disp: 90 capsule, Rfl: 3     gabapentin (NEURONTIN) 300 MG capsule, Take 1 capsule (300 mg total) by mouth 2 (two) times a day., Disp: 180 capsule, Rfl: 3     hydrocortisone (CORTEF) 10 MG tablet, Take 30 mg by mouth every morning. , Disp: , Rfl:      hydrocortisone (CORTEF) 20 MG tablet, Take 20 mg by mouth daily. In the afternoon, Disp: , Rfl:      LORazepam (ATIVAN) 0.5 MG tablet, Take 1-2 tablets (0.5-1 mg total) by mouth every 8 (eight) hours as needed for anxiety., Disp: 60 tablet, Rfl: 0     medical " "cannabis (Patient's own supply. Not a prescription), by Other route see administration instructions (This is NOT a prescription, and does not certify that the patient has a qualifying medical condition for medical cannabis.  The purpose of this order is  to document that the patient reports taking medical cannabis.) ., Disp: , Rfl:      mitotane (LYSODREN) 500 mg tablet, Take 1,500 mg by mouth 2 (two) times a day. , Disp: , Rfl:      MULTIVIT,CALC,MINS/IRON/FOLIC (ONE-A-DAY WOMENS FORMULA ORAL), Take by mouth. Take 1 tablet once a day, Disp: , Rfl:      prochlorperazine (COMPAZINE) 10 MG tablet, Take 10 mg by mouth., Disp: , Rfl:      amino acids (DAILY AMINO ORAL), Take 325 mg by mouth daily., Disp: , Rfl:      ondansetron (ZOFRAN) 4 MG tablet, Take 1 tablet (4 mg total) by mouth every 6 (six) hours as needed for nausea., Disp: 30 tablet, Rfl: 1     oxyCODONE (ROXICODONE) 10 mg immediate release tablet, Take 1 tablet (10 mg total) by mouth every 4 (four) hours as needed for pain., Disp: 20 tablet, Rfl: 0    Review of Systems   A 12 point comprehensive review of systems was negative except as noted.      Objective:      /66 (Patient Site: Right Arm, Patient Position: Sitting, Cuff Size: Adult Regular)   Pulse 84   Resp 16   Ht 5' 4\" (1.626 m)   Wt 154 lb 6.4 oz (70 kg)   Breastfeeding? No   BMI 26.50 kg/m      General appearance: alert, appears stated age and cooperative  Lungs: clear to auscultation bilaterally  Heart: regular rate and rhythm, S1, S2 normal, no murmur, click, rub or gallop  Psychiatric: Speech is fluent and thought process is linear, affect is flat to tearful, mood is described as depressed and anxious          "

## 2021-06-21 NOTE — PROGRESS NOTES
Suzy arrived A&Ox4 ambualtory and stable, confirms she is here for NS. She is accompanied by her . Pt states she is very fatigued, constant nausea, taking antiemetics but not able to eat much. Constant general body aches.   PIV started w ease, R FA, with good blood return, easily flushed NS.  2 liter of NS given to pt per her request, over 1hour each . She states she felt some improvement after the hydration. She was able to eat some lunch today. VSS  Pt IV DC'd and site covered with guaze and coban. 1350 Suzy d'cd A&Ox4 ambulatory and stable, will return Monday for hydration.

## 2021-06-21 NOTE — PROGRESS NOTES
Pt came into infusion clinic for IV fluids as ordered. IV patent throughout infusion. Pt tolerated infusion with no complications. Pt states she is feeling better and would only like 1L of fluids today. Pt c/o urinary burning, strong odor, and cloudy. UA obtained per Dr. Garzon. Pt left infusion clinic via ambulatory. Pt has apt with her Oncologist on Tuesday and will discuss further need for fluids at that time.

## 2021-06-22 NOTE — PROGRESS NOTES
[facility], [facility] Clinic Note    Suzy Rodríguez  1985   [id number]    Suzy Rodríguez is a 33 y.o. female presenting to discuss the following:     CC:   Chief Complaint   Patient presents with     Dysuria       HPI:    DYSURIA -   Here with dysuria and incomplete emptying. Feels like previous UTIs. Has history of recurrent UTI's. No allergies to antibiotics. No fevers, no chills, no flank pain.     ANEMIA/IRON STUDIES -   Requests CBC and iron studies repeat. Is taking iron supplement.   Feels weak and tired. Tolerating iron supplement okay.     States she has been recommended to have IUD placed for iron deficiency anemia management.  Has previous tubal ligation.  Wonders about timing of IUD placement.    ATIVAN -   Taking for nausea. Was previously taking 3, now down to 0.5, now can't sleep. Would like 1mg.  Prescription has been from radiation oncologist.    RECURRENT YEAST INFECTIONS -   Reports frequent yeast infections on chemotherapy.  Like she is starting to get a yeast infection now, reports changes in discharge and itching.      ROS:   Pertinent ROS in HPI above.     PMH:   Patient Active Problem List   Diagnosis     Adipose Tissue Fibrolipoma     Generalized anxiety disorder     Urticaria Allergic Due To Envenomation     Major Depression, Recurrent     MTHFR mutation (H)     S/P gastric bypass     Hypervitaminosis A     Calculus of ureter     Hydronephrosis with urinary obstruction due to ureteral calculus     Calculus of kidney     Cushing's syndrome (H)     Carcinoma, adrenal cortical (H)     Malignant neoplasm of cortex of right adrenal gland (H)     Other malignant neuroendocrine tumors (H)     Neoplastic malignant related fatigue     Adrenal insufficiency (H)     Luetscher's syndrome     Nausea and vomiting     Herpes simplex infection of genitourinary system       Past Medical History:   Diagnosis Date     Anemia     thought due to heavy menses.     Carcinoma, adrenal cortical (H)  "2018     Clotting disorder (H)      Depression      GERD (gastroesophageal reflux disease)      Hypertension due to endocrine disorder 2018     Kidney stones     passed on their own     Menstrual disorder     menorrhagia     MTHFR mutation (H)     believed to be homozygous for the mutation.     SHREYA on CPAP 2015    Stopped using CPAP the  after weight loss as even at lower pressure/adjustment couldn't tolerate the cpap. Resting well, cautioned to watch for any signs of fatigue and consider \"titration study\" in the future to see if cpap is still required at her new weight.     Pneumonia     hx of recurrent pneumonia issues/respiratory infections.     Polycystic ovary syndrome     able to get pregnant, not on meds.     Pulmonary embolism (H)  or so    briefly on wafarin.     Sleep apnea     cpap     Vitamin D deficiency        PSH:   Past Surgical History:   Procedure Laterality Date     ADRENALECTOMY Right       SECTION, CLASSIC      x2     RI CYSTO/URETERO W/LITHOTRIPSY &INDWELL STENT INSRT Left 2018    Procedure: CYSTOSCOPY, LEFT URETEROSCOPY LASER LITHOTRIPSY STENT INSERTION;  Surgeon: Skyler Delvalle MD;  Location: [facility];  Service: Urology     RI LAP GASTRIC BYPASS/DERICK-EN-Y N/A 2016    Procedure: GASTRIC BYPASS LAPAROSCOPIC DERICK-EN-Y;  Surgeon: Randy Sutherland MD;  Location: [facility];  Service: General     TUBAL LIGATION       WISDOM TOOTH EXTRACTION Bilateral          MEDICATIONS:   Current Outpatient Medications on File Prior to Visit   Medication Sig Dispense Refill     amino acids (DAILY AMINO ORAL) Take 325 mg by mouth daily.       buPROPion (WELLBUTRIN) 100 MG tablet Take 1 tablet (100 mg total) by mouth 2 (two) times a day. 180 tablet 3     calcium carbonate (CALCIUM ANTACID) 400 mg calcium (1,000 mg) Chew Chew. Take 1 tablet once a day       cholecalciferol, vitamin D3, 1,000 unit tablet Take 2,000 Units by mouth daily.       " "EPINEPHrine (EPIPEN) 0.3 mg/0.3 mL (1:1,000) atIn As directed       FLUoxetine (PROZAC) 40 MG capsule Take 1 capsule (40 mg total) by mouth daily. 90 capsule 3     gabapentin (NEURONTIN) 300 MG capsule Take 1 capsule (300 mg total) by mouth 3 (three) times a day. 270 capsule 3     hydrocortisone (CORTEF) 10 MG tablet Take 30 mg by mouth every morning.        hydrocortisone (CORTEF) 20 MG tablet Take 20 mg by mouth daily. In the afternoon       LORazepam (ATIVAN) 0.5 MG tablet Take 1-2 tablets (0.5-1 mg total) by mouth every 8 (eight) hours as needed for anxiety. 60 tablet 0     medical cannabis (Patient's own supply. Not a prescription) by Other route see administration instructions (This is NOT a prescription, and does not certify that the patient has a qualifying medical condition for medical cannabis.  The purpose of this order is  to document that the patient reports taking medical cannabis.) .       mitotane (LYSODREN) 500 mg tablet Take 1,500 mg by mouth 3 (three) times a day.              MULTIVIT,CALC,MINS/IRON/FOLIC (ONE-A-DAY WOMENS FORMULA ORAL) Take by mouth. Take 1 tablet once a day       ondansetron (ZOFRAN) 4 MG tablet Take 1 tablet (4 mg total) by mouth every 6 (six) hours as needed for nausea. 30 tablet 1     oxyCODONE (ROXICODONE) 10 mg immediate release tablet Take 1 tablet (10 mg total) by mouth every 4 (four) hours as needed for pain. 20 tablet 0     prochlorperazine (COMPAZINE) 10 MG tablet Take 10 mg by mouth.       No current facility-administered medications on file prior to visit.        ALLERGIES:  Allergies   Allergen Reactions     Ibuprofen Hives, Swelling and Other (See Comments)     Venom-Honey Bee Swelling       PHYSICAL EXAM:   /68   Pulse 96   Temp 99  F (37.2  C) (Oral)   Resp 20   Ht 5' 4\" (1.626 m)   Wt 155 lb 4 oz (70.4 kg)   BMI 26.65 kg/m     GENERAL: Suzy is a pleasant, overweight female, appears stated age.  In no acute distress.  HEART: Regular rate and " rhythm, no murmurs.  LUNGS: Clear to auscultation bilaterally, unlabored.  : No suprapubic tenderness to palpation, no costovertebral angle tenderness.    LABS:   Recent Results (from the past 240 hour(s))   Urinalysis-UC if Indicated   Result Value Ref Range    Color, UA Yellow Colorless, Yellow, Straw, Light Yellow    Clarity, UA Cloudy (!) Clear    Glucose, UA Negative Negative    Bilirubin, UA Negative Negative    Ketones, UA Negative Negative    Specific Gravity, UA 1.015 1.005 - 1.030    Blood, UA Moderate (!) Negative    pH, UA 6.5 5.0 - 8.0    Protein, UA Negative Negative mg/dL    Urobilinogen, UA 1.0 E.U./dL 0.2 E.U./dL, 1.0 E.U./dL    Nitrite, UA Negative Negative    Leukocytes, UA Large (!) Negative    Bacteria, UA Few (!) None Seen hpf    RBC, UA 5-10 (!) None Seen, 0-2 hpf    WBC, UA >100 (!) None Seen, 0-5 hpf    Squam Epithel, UA 5-10 (!) None Seen, 0-5 lpf     ASSESSMENT & PLAN:     Suzy Rodríguez is a 33 y.o. female presenting today for evaluation of dysuria.    We discussed request for Ativan.  Given multiple controlled substance prescriptions, recommended that this come from a single provider.  She should contact Dr. Monsivais for follow-up of her Ativan prescription.    1. Pain with urination  2. Acute cystitis without hematuria  - Urinalysis-UC if Indicated  - Culture, Urine  - nitrofurantoin, macrocrystal-monohydrate, (MACROBID) 100 MG capsule; Take 1 capsule (100 mg total) by mouth 2 (two) times a day for 7 days.  Dispense: 14 capsule; Refill: 0    Symptoms and UA consistent with urinary tract infection.  Will treat with Macrobid and follow-up on urine culture.    3. Iron deficiency anemia due to chronic blood loss  - HM2(CBC w/o Differential)  - Ferritin    Last labs in [facility] records in July.  States she is taking iron supplement.  Will recheck CBC and ferritin level today.  Recommend she check with Dr. Garzon for timing of IUD insertion due to history of tubal ligation.    4. Yeast  infection of the vagina  - fluconazole (DIFLUCAN) 150 MG tablet; Take 1 tablet (150 mg total) by mouth every other day.  Dispense: 3 tablet; Refill: 0     Prescribe Diflucan for recurrent yeast infection, mild symptoms of yeast infection, and prescription of antibiotic.  Take 1 tablet every other day.  Let us know if symptoms are not resolving.    RTC: If symptoms are not improving, per Dr. Garzon for IUD, pending lab results.     Jayna Gomez, DO

## 2021-06-22 NOTE — PROGRESS NOTES
UTI is e coli. Sensitivities pending.   Ferritin low, hemoglobin low, consistent with iron deficiency anemia.   Results given by MyChart.   Recommend following up with PCP to further discuss iron deficiency anemia and management.   Jayna Gomez, DO

## 2021-06-23 NOTE — TELEPHONE ENCOUNTER
Describe your symptoms: fainting spells  Any pain: no  New/Ongoing: New  How long have you been having symptoms: 2  week(s)  Have you been seen for this:  No.  Appointment offered? offered 2/1 with PCP, patient declined.  Patient states that she has been seen by her oncologist for this and oncologist had recommended that she be seen by PCP for this concern.  Triage offered?: patient declined  Home remedies tried: N/A  Pharmacy Name and Location: N/A  Okay to leave a detailed message? Yes     Patient refused to be on waitlist and will like to get in sooner than 2/1.  Please advise.

## 2021-06-23 NOTE — PROGRESS NOTES
"Insertion of IUD  Date/Time: 1/25/2019 11:30 AM  Performed by: Gillian Garzon MD  Authorized by: Gillian Garzon MD   Consent: Verbal consent obtained. Written consent obtained.  Risks and benefits: risks, benefits and alternatives were discussed  Consent given by: patient  Patient understanding: patient states understanding of the procedure being performed  Patient consent: the patient's understanding of the procedure matches consent given  Procedure consent: procedure consent matches procedure scheduled  Relevant documents: relevant documents present and verified  Test results: test results available and properly labeled  Required items: required blood products, implants, devices, and special equipment available  Patient identity confirmed: verbally with patient  Time out: Immediately prior to procedure a \"time out\" was called to verify the correct patient, procedure, equipment, support staff and site/side marked as required.  Comments: After a discussion of risks and benefits, consent form was signed.  Negative pregnancy test reviewed with patient.      Patient was prepped and draped in the usual fashion and placed in the dorsal lithotomy position.  Her cervix was well-visualized with a sterile speculum and surface of the cervix was cleansed with betadine solution x2.  A tenaculum was then used to grasp the anterior lip of the cervix and uterus was sounded to 8 cm.  A Mirena IUD was then placed at the uterine fundus without difficulty.  As patient was having her menses, blood and clots were visible in the vaginal vault before and after the procedure.  Strings were trimmed to approximately 3 cm outside the external cervical os.  Upon removal of the tenaculum, a small amount of bleeding from the anterior lip of the cervix was stopped with pressure and Monsel solution.  Patient was advised to return in 1 month for recheck of IUD position.    Results for orders placed or performed in visit on " 01/25/19  -Pregnancy (Beta-hCG, Qual), Urine       Result                      Value             Ref Range           Pregnancy Test, Urine       Negative          Negative            Specific [place], UA        1.015             1.001 - 1.030      Mirena  NDC 21604-664-75  Lot: [id number]  Exp: Jun 2021

## 2021-06-24 NOTE — PROGRESS NOTES
"  Assessment/Plan:       1. IUD check up  IUD appears to be in satisfactory position.  Patient overall is happy with this method of contraception so far.  She states she has had very mild spotting, no intense cramping.  She will be due for her menses in about 1 week.  If she has bothersome spotting, she will let me know.    2. Cyst of left ovary  This was found on CT of the abdomen and pelvis done for follow-up of her adrenal carcinoma.  No metastatic disease was found, but she has a persistent \"multicystic appearance\" to the left ovary.  Will order pelvic ultrasound to further characterize.  - US Pelvis With Transvaginal Non OB; Future    3. Generalized anxiety disorder/Moderate episode of recurrent major depressive disorder (H)  Patient's PHQ-9 score is significantly elevated.  Discussed increasing her Wellbutrin to 200 mg in the morning and 100 mg in the evening.  We will plan on checking in with her again in terms of her mood in about 1 month.  The following are part of a depression follow up plan for the patient:  mental health care management  - buPROPion (WELLBUTRIN) 100 MG tablet; Take 200 mg in AM and 100 mg in PM  Dispense: 270 tablet; Refill: 3        Subjective:       Suzy Rodríguez is a 33 y.o. female who presents for primarily an order for a pelvic ultrasound.  She was found on follow-up CT scan for her adrenal carcinoma to have a \"multicystic appearance\" to the left ovary.  This had apparently been persistent for some time and further recommendation was recommended with pelvic ultrasound.  This did have the appearance of a hemorrhagic cyst.  Patient is asymptomatic in this regard, does not have any pressure or pain in her lower abdomen.  In terms of her IUD, we inserted a Mirena IUD on 1/25/19.  Patient states she has been doing well since that time.  She has had a mild amount of vaginal spotting here and there, but no heavy bleeding and no severe cramping.  She has no pain with intercourse.  She " does score positively on question here is related to her mood today.  Her KIMBERLY-7 score is 16 with significant symptoms of excessive worry, trouble relaxing and feeling restless.  Her PHQ-9 score is 21 with symptoms including fatigue and feeling down.    The following portions of the patient's history were reviewed and updated as appropriate: allergies, current medications, past medical history, past social history, past surgical history and problem list.      Current Outpatient Medications:      amino acids (DAILY AMINO ORAL), Take 325 mg by mouth daily., Disp: , Rfl:      buPROPion (WELLBUTRIN) 100 MG tablet, Take 1 tablet (100 mg total) by mouth 2 (two) times a day., Disp: 180 tablet, Rfl: 3     calcium carbonate (CALCIUM ANTACID) 400 mg calcium (1,000 mg) Chew, Chew. Take 1 tablet once a day, Disp: , Rfl:      cholecalciferol, vitamin D3, 1,000 unit tablet, Take 2,000 Units by mouth daily., Disp: , Rfl:      EPINEPHrine (EPIPEN) 0.3 mg/0.3 mL (1:1,000) atIn, As directed, Disp: , Rfl:      fludrocortisone (FLORINEF) 0.1 mg tablet, Take 0.05 mg by mouth., Disp: , Rfl:      FLUoxetine (PROZAC) 40 MG capsule, Take 1 capsule (40 mg total) by mouth daily., Disp: 90 capsule, Rfl: 3     gabapentin (NEURONTIN) 300 MG capsule, Take 1 capsule (300 mg total) by mouth 3 (three) times a day., Disp: 270 capsule, Rfl: 3     hydrocortisone (CORTEF) 10 MG tablet, Take 30 mg by mouth every morning. , Disp: , Rfl:      hydrocortisone (CORTEF) 20 MG tablet, Take 20 mg by mouth daily. In the afternoon, Disp: , Rfl:      levothyroxine (SYNTHROID, LEVOTHROID) 125 MCG tablet, Take 125 mcg by mouth., Disp: , Rfl:      LORazepam (ATIVAN) 0.5 MG tablet, Take 1-2 tablets (0.5-1 mg total) by mouth every 8 (eight) hours as needed for anxiety., Disp: 60 tablet, Rfl: 0     medical cannabis (Patient's own supply. Not a prescription), by Other route see administration instructions (This is NOT a prescription, and does not certify that the patient  "has a qualifying medical condition for medical cannabis.  The purpose of this order is  to document that the patient reports taking medical cannabis.) ., Disp: , Rfl:      mitotane (LYSODREN) 500 mg tablet, Take 1,500 mg by mouth 3 (three) times a day.    , Disp: , Rfl:      MULTIVIT,CALC,MINS/IRON/FOLIC (ONE-A-DAY WOMENS FORMULA ORAL), Take by mouth. Take 1 tablet once a day, Disp: , Rfl:      ondansetron (ZOFRAN) 4 MG tablet, Take 1 tablet (4 mg total) by mouth every 6 (six) hours as needed for nausea., Disp: 30 tablet, Rfl: 1     prochlorperazine (COMPAZINE) 10 MG tablet, Take 10 mg by mouth., Disp: , Rfl:      oxyCODONE (ROXICODONE) 10 mg immediate release tablet, Take 1 tablet (10 mg total) by mouth every 4 (four) hours as needed for pain., Disp: 20 tablet, Rfl: 0    Review of Systems   Pertinent items are noted in HPI.      Objective:      /68 (Patient Site: Left Arm, Patient Position: Sitting, Cuff Size: Adult Regular)   Pulse 96   Resp 20   Ht 5' 4\" (1.626 m)   Wt 159 lb 1.6 oz (72.2 kg)   LMP 01/22/2019   Breastfeeding? No   BMI 27.31 kg/m      General appearance: appears stated age, cooperative and fatigued  Pelvic: cervix normal in appearance, external genitalia normal, no adnexal masses or tenderness, no cervical motion tenderness, rectovaginal septum normal, uterus normal size, shape, and consistency and vagina normal without discharge  Psychiatric: Speech is fluent and thought process is linear, affect is reactive, mood is described as mildly depressed          "

## 2021-06-28 NOTE — PROGRESS NOTES
Progress Notes by Gillian Garzon MD at 10/25/2019 11:15 AM     Author: Gillian Garzon MD Service: -- Author Type: Physician    Filed: 10/25/2019 11:15 AM Encounter Date: 10/25/2019 Status: Signed    : Gillian Garzon MD (Physician)         Assessment:   The primary encounter diagnosis was Dysuria. A diagnosis of Acute UTI (urinary tract infection) was also pertinent to this visit.     Plan:     Medications Ordered   Medications   ? nitrofurantoin, macrocrystal-monohydrate, (MACROBID) 100 MG capsule     Sig: Take 1 capsule (100 mg total) by mouth 2 (two) times a day for 5 days.     Dispense:  10 capsule     Refill:  0     Patient Instructions         Urinary Tract Infections in Women    Urinary tract infections (UTIs) are most often caused by bacteria. These bacteria enter the urinary tract. The bacteria may come from inside the body. Or they may travel from the skin outside the rectum or vagina into the urethra. Female anatomy makes it easy for bacteria from the bowel to enter a womans urinary tract, which is the most common source of UTI. This means women develop UTIs more often than men. Pain in or around the urinary tract is a common UTI symptom. But the only way to know for sure if you have a UTI for the healthcare provider to test your urine. The two tests that may be done are the urinalysis and urine culture.  Types of UTIs    Cystitis. A bladder infection (cystitis) is the most common UTI in women. You may have urgent or frequent need to pee. You may also have pain, burning when you pee, and bloody urine.    Urethritis. This is an inflamed urethra, which is the tube that carries urine from the bladder to outside the body. You may have lower stomach or back pain. You may also have urgent or frequent need to pee.    Pyelonephritis. This is a kidney infection. If not treated, it can be serious and damage your kidneys. In severe cases, you may need to stay in the hospital. You may have a  fever and lower back pain.    Medicines to treat a UTI  Most UTIs are treated with antibiotics. These kill the bacteria. The length of time you need to take them depends on the type of infection. It may be as short as 3 days. If you have repeated UTIs, you may need a low-dose antibiotic for several months. Take antibiotics exactly as directed. Dont stop taking them until all of the medicine is gone. If you stop taking the antibiotic too soon, the infection may not go away. You may also develop a resistance to the antibiotic. This can make it much harder to treat.  Lifestyle changes to treat and prevent UTIs  The lifestyle changes below will help get rid of your UTI. They may also help prevent future UTIs.    Drink plenty of fluids. This includes water, juice, or other caffeine-free drinks. Fluids help flush bacteria out of your body.    Empty your bladder. Always empty your bladder when you feel the urge to pee. And always pee before going to sleep. Urine that stays in your bladder can lead to infection. Try to pee before and after sex as well.    Practice good personal hygiene. Wipe yourself from front to back after using the toilet. This helps keep bacteria from getting into the urethra.    Use condoms during sex. These help prevent UTIs caused by sexually transmitted bacteria. Also don't use spermicides during sex. These can increase the risk for UTIs. Choose other forms of birth control instead. For women who tend to get UTIs after sex, a low-dose of a preventive antibiotic may be used. Be sure to discuss this option with your healthcare provider.    Follow up with your healthcare provider as directed. He or she may test to make sure the infection has cleared. If needed, more treatment may be started.  Date Last Reviewed: 7/1/2019 2000-2019 [employer]. [address] [place] [address], [place] [address]. All rights reserved. This information is not intended as a substitute for professional medical care.  Always follow your healthcare professional's instructions.          Understanding Urinary Tract Infections (UTIs)  Most UTIs are caused by bacteria, although they may also be caused by viruses or fungi. Bacteria from the bowel are the most common source of infection. The infection may start because of any of the following:    Sexual activity. During sex, bacteria can travel from the penis, vagina, or rectum into the urethra.     Bacteria on the skin outside the rectum may travel into the urethra. This is more common in women since the rectum and urethra are closer to each other than in men. Wiping from front to back after using the toilet and keeping the area clean can help prevent germs from getting to the urethra.    Blockage of urine flow through the urinary tract. If urine sits too long, germs may start to grow out of control.      Parts of the urinary tract  The infection can occur in any part of the urinary tract.    The kidneys collect and store urine.    The ureters carry urine from the kidneys to the bladder.    The bladder holds urine until you are ready to let it out.    The urethra carries urine from the bladder out of the body. It is shorter in women, so bacteria can move through it more easily. The urethra is longer in men, so a UTI is less likely to reach the bladder or kidneys in men.  Date Last Reviewed: 1/1/2017 2000-2019 [employer]. [address] [place] [address], [place] [address]. All rights reserved. This information is not intended as a substitute for professional medical care. Always follow your healthcare professional's instructions.        Based on the information that you have provided, I have placed an order for you to start treatment.  View your full visit summary for details. Click on the link below to access your visit summary.    Your pharmacist will address any questions you may have about taking the medication.    Return for further follow up if needed. Call  [phone]) or schedule an appointment via [facility]..    Subjective:   Suzy Rodríguez is a 34 y.o. female who submitted an eVisit request for evaluation of her Dysuria.  See the questionnaire and message section of encounter report for information related to history of present illness and review of systems.    The following portions of the patient's history were reviewed and updated as appropriate:  She does not have any pertinent problems on file.  She is allergic to ibuprofen and venom-honey bee..     Objective:   No exam performed today, patient submitted as eVisit.

## 2021-06-29 NOTE — PROGRESS NOTES
Progress Notes by Darling Montelongo CMA at 6/16/2020 10:00 AM     Author: Darling Montelongo CMA Service: -- Author Type: Certified Medical Assistant    Filed: 6/16/2020  4:59 PM Encounter Date: 6/16/2020 Status: Signed    : Darling Montelongo CMA (Certified Medical Assistant)       This video/telephone visit will be conducted via a call between you and your physician/provider. We have found that certain health care needs can be provided without the need for an in-person physical exam. This service lets us provide the care you need with a video /telephone conversation. If a prescription is necessary we can send it directly to your pharmacy. If lab work is needed we can place an order for that and you can then stop by our lab to have the test done at a later time.   Just as we bill insurance for in-person visits, we also bill insurance for video/telephone visits. If you have questions about your insurance coverage, we recommend that you speak with your insurance company.   Patient has given verbal consent for video visit? yes  Patient would like the video visit invitation sent by: Text to cell phone: [phone]  Patient verified allergies, medications and pharmacy via phone. Patient states she is ready for visit.    Darling Montelongo CMA  [place]  was reviewed prior to seeing patient today. See below for embedded report.

## 2021-06-29 NOTE — PROGRESS NOTES
Progress Notes by Stacey Shafer RN at 8/18/2020 12:00 PM     Author: Stacey Shafer RN Service: -- Author Type: Registered Nurse    Filed: 8/18/2020  4:00 PM Encounter Date: 8/18/2020 Status: Signed    : Stacey Shafer RN (Registered Nurse)       This video visit will be conducted via a call between you and your physician/provider. We have found that certain health care needs can be provided without the need for an in-person physical exam. This service lets us provide the care you need with a video conversation. If a prescription is necessary we can send it directly to your pharmacy. If lab work is needed we can place an order for that and you can then stop by our lab to have the test done at a later time.   Just as we bill insurance for in-person visits, we also bill insurance for video visits. If you have questions about your insurance coverage, we recommend that you speak with your insurance company.   Patient has given verbal consent for video visit? yes  Patient would like the video visit invitation sent by: Text to cell phone: [phone]   Stacey LIRIANO RN    Patient verified allergies, medications and pharmacy via phone. Patient states she  is ready for visit.

## 2021-06-30 ENCOUNTER — COMMUNICATION - HEALTHEAST (OUTPATIENT)
Dept: FAMILY MEDICINE | Facility: CLINIC | Age: 36
End: 2021-06-30

## 2021-07-03 NOTE — ADDENDUM NOTE
Addendum Note by Bassem Ratliff MLT at 7/25/2019 10:20 AM     Author: Bassem Ratliff MLT Service: -- Author Type:     Filed: 7/25/2019  4:13 PM Encounter Date: 7/25/2019 Status: Signed    : Bassem Ratliff MLT ()    Addended by: BASSEM RATLIFF on: 7/25/2019 04:13 PM        Modules accepted: Orders

## 2021-07-05 ENCOUNTER — ONCOLOGY VISIT (OUTPATIENT)
Dept: ONCOLOGY | Facility: CLINIC | Age: 36
End: 2021-07-05
Attending: INTERNAL MEDICINE
Payer: COMMERCIAL

## 2021-07-05 ENCOUNTER — APPOINTMENT (OUTPATIENT)
Dept: LAB | Facility: CLINIC | Age: 36
End: 2021-07-05
Attending: INTERNAL MEDICINE
Payer: COMMERCIAL

## 2021-07-05 VITALS
BODY MASS INDEX: 26.74 KG/M2 | SYSTOLIC BLOOD PRESSURE: 119 MMHG | OXYGEN SATURATION: 99 % | RESPIRATION RATE: 16 BRPM | HEART RATE: 95 BPM | WEIGHT: 155.8 LBS | DIASTOLIC BLOOD PRESSURE: 73 MMHG | TEMPERATURE: 98.6 F

## 2021-07-05 DIAGNOSIS — C74.90: Primary | ICD-10-CM

## 2021-07-05 DIAGNOSIS — C74.01 ADRENAL CORTEX CANCER, RIGHT (H): ICD-10-CM

## 2021-07-05 DIAGNOSIS — E27.40 ADRENAL INSUFFICIENCY (H): ICD-10-CM

## 2021-07-05 DIAGNOSIS — C74.01 MALIGNANT NEOPLASM OF CORTEX OF RIGHT ADRENAL GLAND (H): ICD-10-CM

## 2021-07-05 LAB
ALBUMIN SERPL-MCNC: 3 G/DL (ref 3.4–5)
ALP SERPL-CCNC: 98 U/L (ref 40–150)
ALT SERPL W P-5'-P-CCNC: 18 U/L (ref 0–50)
ANION GAP SERPL CALCULATED.3IONS-SCNC: 4 MMOL/L (ref 3–14)
AST SERPL W P-5'-P-CCNC: 17 U/L (ref 0–45)
BASOPHILS # BLD AUTO: 0.1 10E9/L (ref 0–0.2)
BASOPHILS NFR BLD AUTO: 1.4 %
BILIRUB SERPL-MCNC: 0.3 MG/DL (ref 0.2–1.3)
BUN SERPL-MCNC: 12 MG/DL (ref 7–30)
CALCIUM SERPL-MCNC: 7.9 MG/DL (ref 8.5–10.1)
CHLORIDE SERPL-SCNC: 110 MMOL/L (ref 94–109)
CO2 SERPL-SCNC: 28 MMOL/L (ref 20–32)
CREAT SERPL-MCNC: 0.83 MG/DL (ref 0.52–1.04)
DIFFERENTIAL METHOD BLD: ABNORMAL
EOSINOPHIL # BLD AUTO: 0.1 10E9/L (ref 0–0.7)
EOSINOPHIL NFR BLD AUTO: 1.4 %
ERYTHROCYTE [DISTWIDTH] IN BLOOD BY AUTOMATED COUNT: 12.6 % (ref 10–15)
GFR SERPL CREATININE-BSD FRML MDRD: >90 ML/MIN/{1.73_M2}
GLUCOSE SERPL-MCNC: 73 MG/DL (ref 70–99)
HCT VFR BLD AUTO: 45.9 % (ref 35–47)
HGB BLD-MCNC: 15 G/DL (ref 11.7–15.7)
IMM GRANULOCYTES # BLD: 0 10E9/L (ref 0–0.4)
IMM GRANULOCYTES NFR BLD: 0.2 %
LYMPHOCYTES # BLD AUTO: 1.8 10E9/L (ref 0.8–5.3)
LYMPHOCYTES NFR BLD AUTO: 41.5 %
MCH RBC QN AUTO: 31.6 PG (ref 26.5–33)
MCHC RBC AUTO-ENTMCNC: 32.7 G/DL (ref 31.5–36.5)
MCV RBC AUTO: 97 FL (ref 78–100)
MONOCYTES # BLD AUTO: 0.4 10E9/L (ref 0–1.3)
MONOCYTES NFR BLD AUTO: 9.2 %
NEUTROPHILS # BLD AUTO: 2 10E9/L (ref 1.6–8.3)
NEUTROPHILS NFR BLD AUTO: 46.3 %
NRBC # BLD AUTO: 0 10*3/UL
NRBC BLD AUTO-RTO: 0 /100
PLATELET # BLD AUTO: 107 10E9/L (ref 150–450)
POTASSIUM SERPL-SCNC: 3.4 MMOL/L (ref 3.4–5.3)
PROT SERPL-MCNC: 6.2 G/DL (ref 6.8–8.8)
RBC # BLD AUTO: 4.74 10E12/L (ref 3.8–5.2)
SODIUM SERPL-SCNC: 141 MMOL/L (ref 133–144)
T4 FREE SERPL-MCNC: 0.91 NG/DL (ref 0.76–1.46)
TSH SERPL DL<=0.005 MIU/L-ACNC: 0.22 MU/L (ref 0.4–4)
WBC # BLD AUTO: 4.2 10E9/L (ref 4–11)

## 2021-07-05 PROCEDURE — G0463 HOSPITAL OUTPT CLINIC VISIT: HCPCS

## 2021-07-05 PROCEDURE — 36415 COLL VENOUS BLD VENIPUNCTURE: CPT

## 2021-07-05 PROCEDURE — 82024 ASSAY OF ACTH: CPT | Performed by: INTERNAL MEDICINE

## 2021-07-05 PROCEDURE — 82627 DEHYDROEPIANDROSTERONE: CPT | Performed by: INTERNAL MEDICINE

## 2021-07-05 PROCEDURE — 80375 DRUG/SUBSTANCE NOS 1-3: CPT | Performed by: INTERNAL MEDICINE

## 2021-07-05 PROCEDURE — 84443 ASSAY THYROID STIM HORMONE: CPT | Performed by: INTERNAL MEDICINE

## 2021-07-05 PROCEDURE — 99214 OFFICE O/P EST MOD 30 MIN: CPT | Performed by: INTERNAL MEDICINE

## 2021-07-05 PROCEDURE — 84439 ASSAY OF FREE THYROXINE: CPT | Performed by: INTERNAL MEDICINE

## 2021-07-05 PROCEDURE — 80053 COMPREHEN METABOLIC PANEL: CPT | Performed by: INTERNAL MEDICINE

## 2021-07-05 PROCEDURE — 84999 UNLISTED CHEMISTRY PROCEDURE: CPT | Performed by: INTERNAL MEDICINE

## 2021-07-05 PROCEDURE — 85025 COMPLETE CBC W/AUTO DIFF WBC: CPT | Performed by: INTERNAL MEDICINE

## 2021-07-05 RX ORDER — CALCITRIOL 0.5 UG/1
CAPSULE, LIQUID FILLED ORAL
COMMUNITY
Start: 2021-06-08 | End: 2022-10-04

## 2021-07-05 ASSESSMENT — PAIN SCALES - GENERAL: PAINLEVEL: NO PAIN (0)

## 2021-07-05 NOTE — PROGRESS NOTES
"  [facility] Oncology Followup  July 5, 2021    REFERRING PROVIDER: Warren Mansfield MD from [facility] Urologic Oncology clinic.      REASON FOR CURRENT VISIT: Follow-up for adrenocortical carcinoma,    ONCOLOGIC HISTORY:  1. 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   - CT abdomen and pelvis stone protocol without contrast 5 8017 showed \"9.2 x 8 cm heterogeneous right upper quadrant mass with small foci of calcification within it which is likely arising from the adrenal gland though inseparable from liver and upper pole of right kidney. It is incompletely evaluated on this noncontrast study. 3 x 2.6 cm mass right lobe of liver on image #85 also incompletely evaluated. 6 x 4 x 4 mm upper third left ureteral obstructing stone with mild to moderate secondary hydronephrosis. Collection of stones at lower pole left kidney extending over an area of approximately 6 x 7 x 4 mm. Additional nonobstructing 1 mm stone upper pole left kidney. 2 mm nonobstructing stone noted upper pole right kidney with no hydronephrosis on the right. Postop change consistent with gastric bypass. Noncontrast images of spleen, left adrenal gland, gallbladder, and pancreas unremarkable. No aneurysm. No adenopathy.\"  - Seen by Dr. Delvalle at [facility] Urology clinic. Referred to Dr. Alvino Patel and then Dr. Warren Mansfield.  - Endocrinology team directed workup showed high DHEAS level of 740 pre-surgery.   - Right open adrenalectomy and hepatic mobilization performed by Dr. Warren Mansfield on 6/25/2018. Pathology showed adrenocortical carcinoma measuring 11.3 cm, weighing 413 g with extension into or through the adrenal capsule negative lymphovascular invasion, tumor necrosis present, margins involved by tumor, no regional lymph nodes identified, pathologic " stage T2 NX.  pathology review reveals low grade ACC.  - DHEAS normalized postsurgery.   - Adjuvant Mitotane started on 7/24/18. Dose increased to 2000mg TID around 10/23/18 due to persistently low troughs. Held 1/16/19 for two weeks and resumed 1/30 at 1000 mg po BID due to very poor tolerance of higher doses. Highest mitotane level was 10.2 on 2/11/19. Mitotane troughs did not rise above 10 despite increase in dose to 1500 BID and then 1500+2000. Started 2000mg BID on 10/28/20. Increased to [id number] since around Christmas 2020.   - Saw radiation oncology at [facility], radiation oncology at [facility], as well as endocrine oncology (Dr. Huertas) at [facility].   - S/p adjuvant RT by Dr. Monsivais - 5040 cGy over 30 fr (8/24/18-10/6/18).  - 2/11/19: OSH CT C/A/P and MRI brain with contrast - no evidence of disease recurrence.   - 06/08/20, 09/14/20, 12/7/20, 3/16/2021: CT C/A/P with contrast - AFUA.    INTERVAL HISTORY:  Ms. Rodríguez is a 35-year-old lady with history notable for right-sided functioning adrenocortical carcinoma (diagnosed in May 2018), who was  on adjuvant mitotane. Her mitotane [phone] since around Christmas 2020.  She was under care of my colleague - Dr. Cintron and is transferring care as Dr. Cintron is leaving.   She stopped the mitotane on 5/4/21 after discussion with Dr. Hilton, endocrinologic oncologist from the [facility], she was routinely following.  After stopping mitotane she endorses significant improvement in her symptoms, though states she still have some tremors and problems with sleeping.  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  She is excited to start working, though endorses that is less energy and time to spend with her kids.  Her weight is stable, blood pressures are normal.   She also follows with Dr. Veena Jaquez in our endocrinology division.       Last month we  "decreased the hydrocortisone to 20/10.    Less jittery. Sleep is improved.   Appetite has gone down.   No complaints.  Overall she is beginning to feel much better.  She reports longstanding problems with word finding that she attributes to the mitotane.  She feels that she is unable to find words to express her thoughts and sometimes she feels like she is slurring her speech.      REVIEW OF SYSTEMS: 14 point ROS negative other than the symptoms noted above in the HPI.    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 Mansfield.     Adrenal mass (H)      Anemia     thought due to heavy menses.     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  Check in May. SCP started.  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     SHREYA on CPAP 12/17/2015    Stopped using CPAP the Fall of 2016 after weight loss as even at lower pressure/adjustment couldn't tolerate the cpap. Resting well, cautioned to watch for any signs of fatigue and consider \"titration study\" in the future to see if cpap is still required at her new weight.     Pneumonia     hx of " recurrent pneumonia issues/respiratory infections.     Polycystic ovary syndrome     able to get pregnant, not on meds.     Pulmonary embolism (H)  or so    briefly on wafarin.     Sleep apnea     cpap     Vitamin D deficiency    MHTFR mutation with h/o mutiple miscarriages.    PAST SURGICAL HISTORY:   Past Surgical History:   Procedure Laterality Date     ADRENALECTOMY Right 2018    Procedure: ADRENALECTOMY;  Right Adrenalectomy,  Embolize Liver , Anesthesia Block;  Surgeon: Warren Mansfield MD;  Location: [facility] OR     ADRENALECTOMY       ADRENALECTOMY Right       SECTION      twice      SECTION      2      SECTION      x2     EMBOLECTOMY ABDOMEN N/A 2018    Procedure: EMBOLECTOMY ABDOMEN;;  Surgeon: Ector Mcintyre MD;  Location: [facility] OR     EXTRACORPOREAL SHOCK WAVE LITHOTRIPSY (ESWL)       GASTRIC BYPASS       LA CYSTO/URETERO W/LITHOTRIPSY &INDWELL STENT INSRT Left 2018    Procedure: CYSTOSCOPY, LEFT URETEROSCOPY LASER LITHOTRIPSY STENT INSERTION;  Surgeon: Skyler Delvalle MD;  Location: [facility];  Service: Urology     LA LAP GASTRIC BYPASS/DERICK-EN-Y N/A 2016    Procedure: GASTRIC BYPASS LAPAROSCOPIC DERICK-EN-Y;  Surgeon: Randy Sutherland MD;  Location: [facility];  Service: General     TUBAL LIGATION       WISDOM TOOTH EXTRACTION Bilateral       SOCIAL HISTORY:   Social History     Tobacco Use     Smoking status: Never Smoker     Smokeless tobacco: Never Used   Substance Use Topics     Alcohol use: Yes     Comment: occ.     Drug use: No     FAMILY HISTORY:   Family History   Problem Relation Age of Onset     Depression Paternal Grandmother      ALLERGIES:   Allergies   Allergen Reactions     Bee Venom Swelling     Ibuprofen Hives and Swelling     CURRENT MEDICATIONS:   Current Outpatient Medications   Medication Instructions     acetaminophen (TYLENOL) 650 mg, Oral, EVERY 4 HOURS PRN     buPROPion (WELLBUTRIN SR) 150 mg, Oral      calcitRIOL (ROCALTROL) 0.5 MCG capsule No dose, route, or frequency recorded.     calcium carbonate 500 mg (elemental) (OSCAL) 500 mg, Oral, 2 TIMES DAILY     clonazePAM (KLONOPIN) 0.5 mg, Oral, 2 TIMES DAILY PRN     diazepam (VALIUM) 5 mg, Oral, ONCE PRN     fludrocortisone (FLORINEF) 0.2 mg, Oral, DAILY     FLUoxetine (PROZAC) 60 mg, Oral, DAILY     gabapentin (NEURONTIN) 900 mg, Oral, 3 TIMES DAILY     hydrocortisone (CORTEF) 10 MG tablet Take 3 tablets (30 MG) every morning and take 2 tablets (20 MG) by mouth every day at 2 PM. Additional as directed.     levothyroxine (SYNTHROID/LEVOTHROID) 125 mcg, Oral, DAILY     ondansetron (ZOFRAN) 8 mg, Oral, EVERY 8 HOURS PRN     prochlorperazine (COMPAZINE) 5 mg, Oral, EVERY 8 HOURS PRN     temazepam (RESTORIL) 15 mg, Oral, AT BEDTIME PRN     UNABLE TO FIND Other, medical cannabis (Patient's own supply. Not a prescription)            PHYSICAL EXAMINATION:  Vital signs: /73 (BP Location: Right arm, Patient Position: Sitting, Cuff Size: Adult Regular)   Pulse 95   Temp 98.6  F (37  C) (Oral)   Resp 16   Wt 70.7 kg (155 lb 12.8 oz)   SpO2 99%   BMI 26.74 kg/m     Gen: NAD, sitting in the chair  HEENT: Moist oral mucosa, supple neck, no LAD  Cards: RRR, no RMG, S1/S2 normal  Pulm: CTABL, no wheezing or rhonchi  Abdo: Soft, nontender, nondistended, positive bowel sounds, no hepatosplenomegaly  Neuro: No gross neurologic deficits are noted  Extr: No edema bilaterally, preserved range of motion  Psych: Appropriate behavior    LABORATORY DATA:   Results for VALERY PANDYA (MRN [id number]) as of 6/7/2021 09:08  Results for VALERY PANDYA (MRN [id number]) as of 7/5/2021 08:07   Ref. Range 6/7/2021 11:48   Cortisol Free Duration Urine Latest Units: h 24   Cortisol Free Urine Latest Ref Range: <=45.0 ug/d 117.4 (H)   Cortisol Free Urine Intrepretation Unknown SEE NOTE   Cortisol Free Urine Random Latest Units: ug/L 54.60   Cortisol ug/g creatinine Latest  Units: ug/g .02   CORTISOL CORTISONE FREE 24 HOUR URINE Unknown Rpt (A)   Creatinine Urine/Volume Latest Units: mg/dL 53   Creatinine Urine/24hr Latest Ref Range: 700 - 1,600 mg/d 1,140               DHEAS was 740 on 6/5/18, and has been <15 on 7/12/18, 8/20/18, 9/19/18, 10/23/18, 11/27/18, 1/9/19, 5/8/19, 6/18/19, 7/17/19, 11/15/19, 12/12/19, 1/22/20, 3/30/20 and 6/8/20, 7/21/20, 8/5/20, 1/20/21, 5/10/21.  Mitotane level (target 14-20): 1.8ng/dl on 8/20/18, 3.5 on 10/25/18, 6.2 on 12/4/18, 8.1 on 1/9/19 and 10.2 on 2/11/19, 14.2 on 6/18/19. 10.8 on 12/12/19.    10 on 1/24/20.   8.9 on 5/14/20.   8.6 on 7/21/20,   8.3 on 8/5/20,   1/2021 Mitotane =14.0   6/4/21 Mitotane = 12.4     10.5 on 5/10/21.  Labs from [facility] 9/24/19 - WBC 4200, ANC 2600, Hb 10.4, Platelets 218K, lytes WNL, creat 0.61, Calcium 8, albumin 3.5, LFTs normal, mitotane level 18.8, cortisol 25.8, ACTH<5, aldosterone 4.9, free T4 1.17, TSH 0.07, DHEAS <15, renin plasma 10.6.     IMAGING DATA:  6/4/21                                                                   IMPRESSION:  1. Stable postoperative changes of right adrenalectomy. No evidence  for local recurrence. No convincing evidence for metastatic disease in  the chest, abdomen, pelvis and bones.  2. No significant interval change in decreased cortical parenchyma  enhancement about the superior pole of the right kidney with  associated cortical retraction likely related to prior  infarction/radiation. Attention on follow-up studies.  3. Additional incidental findings as described above.       ASSESSMENT AND PLAN: A 35-year-old lady with right-sided functioning high-risk adrenocortical carcinoma.     Adrenocortical carcinoma:  - Started on adjuvant mitotane in July 2018 based on her presentation and tumor characteristics including invasion of the adrenal capsule, high mitotic rate, possibly positive margins, and high Ki67, which could result in a rate of recurrence as high as 40%.    The  mitotane was stopped by Dr. Hilton and endocrinologic oncologist from [facility].  She had a level drawn on 5/10/2021, 6 days after discontinuation of mitotane with level of 10.5.  She informs that Dr. Liang would like to continue with mitotane levels every month and interval imaging every 3 months from now .  Thus we will continue to monitor every 4 week  mitotane level, CBC, comprehensive panel, TSH, FT4, DHEAS, ACTH, periodic cholesterol panel, and 24-hour Urine Free Cortisol.   We will plan with monthly follow-up with midlevel providers during this period of time, CT chest abdomen pelvis in 1 month (the last scan was done 2 months ago) and another scan in 3 months, followed by appointment with Dr. Shaw in 4 months from now.    We will continue to hydrocortisone at 20/10.  She continues to see declines in the mitotane levels.  She is currently not taking it.  We will continue to Florinef.  I will see her back in 1 month time to reevaluate the hormone levels.  We will repeat the urine cortisol.  My assessment is that the elevated urine cortisol last month was due to the exogenous hydrocortisone that she was taking.  However we will evaluate this value now.  We will do scans in 2 months time.    #Speech: Today she asked me for a referral to speech therapy because she feels some dysarthria and word finding difficulties since taking the mitotane.  She appears to be verbalizing things normally today but I will have her evaluated and see what we can do.    25 minutes in person with the patient and evaluating lab values.      Amando Shaw MD

## 2021-07-05 NOTE — LETTER
"    7/5/2021         RE: Suzy Rodríguez  [address]  [facility] [address]        Dear Colleague,    Thank you for referring your patient, Suzy Rodríguez, to the [facility]. Please see a copy of my visit note below.      [facility] Oncology Followup  July 5, 2021    REFERRING PROVIDER: Warren Mansfield MD from [facility].      REASON FOR CURRENT VISIT: Follow-up for adrenocortical carcinoma,    ONCOLOGIC HISTORY:  1. 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   - CT abdomen and pelvis stone protocol without contrast 5 [id number] showed \"9.2 x 8 cm heterogeneous right upper quadrant mass with small foci of calcification within it which is likely arising from the adrenal gland though inseparable from liver and upper pole of right kidney. It is incompletely evaluated on this noncontrast study. 3 x 2.6 cm mass right lobe of liver on image #85 also incompletely evaluated. 6 x 4 x 4 mm upper third left ureteral obstructing stone with mild to moderate secondary hydronephrosis. Collection of stones at lower pole left kidney extending over an area of approximately 6 x 7 x 4 mm. Additional nonobstructing 1 mm stone upper pole left kidney. 2 mm nonobstructing stone noted upper pole right kidney with no hydronephrosis on the right. Postop change consistent with gastric bypass. Noncontrast images of spleen, left adrenal gland, gallbladder, and pancreas unremarkable. No aneurysm. No adenopathy.\"  - Seen by Dr. Delvalle at [facility]. Referred to Dr. Alvino Patel and then Dr. Warren Mansfield.  - Endocrinology team directed workup showed high DHEAS level of 740 pre-surgery.   - Right open adrenalectomy and hepatic mobilization performed by Dr. Warren Mansfield on 6/25/2018. " Pathology showed adrenocortical carcinoma measuring 11.3 cm, weighing 413 g with extension into or through the adrenal capsule negative lymphovascular invasion, tumor necrosis present, margins involved by tumor, no regional lymph nodes identified, pathologic stage T2 NX.  pathology review reveals low grade ACC.  - DHEAS normalized postsurgery.   - Adjuvant Mitotane started on 7/24/18. Dose increased to 2000mg TID around 10/23/18 due to persistently low troughs. Held 1/16/19 for two weeks and resumed 1/30 at 1000 mg po BID due to very poor tolerance of higher doses. Highest mitotane level was 10.2 on 2/11/19. Mitotane troughs did not rise above 10 despite increase in dose to 1500 BID and then 1500+2000. Started 2000mg BID on 10/28/20. Increased to [id number] since around Christmas 2020.   - Saw radiation oncology at [facility], radiation oncology at [facility], as well as endocrine oncology (Dr. Huertas) at [facility].   - S/p adjuvant RT by Dr. Monsivais - 5040 cGy over 30 fr (8/24/18-10/6/18).  - 2/11/19: OSH CT C/A/P and MRI brain with contrast - no evidence of disease recurrence.   - 06/08/20, 09/14/20, 12/7/20, 3/16/2021: CT C/A/P with contrast - AFUA.    INTERVAL HISTORY:  Ms. Rodríguez is a 35-year-old lady with history notable for right-sided functioning adrenocortical carcinoma (diagnosed in May 2018), who was  on adjuvant mitotane. Her mitotane [id number] since around Christmas 2020.  She was under care of my colleague - Dr. Cintron and is transferring care as Dr. Cintron is leaving.   She stopped the mitotane on 5/4/21 after discussion with Dr. Hilton, endocrinologic oncologist from the [facility], she was routinely following.  After stopping mitotane she endorses significant improvement in her symptoms, though states she still have some tremors and problems with sleeping.  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  She is excited to start working, though endorses that is less energy and time to spend with her kids.  Her weight is stable, blood pressures are normal.   She also follows with Dr. Veena Jaquez in our endocrinology division.       Last month we decreased the hydrocortisone to 20/10.    Less jittery. Sleep is improved.   Appetite has gone down.   No complaints.  Overall she is beginning to feel much better.  She reports longstanding problems with word finding that she attributes to the mitotane.  She feels that she is unable to find words to express her thoughts and sometimes she feels like she is slurring her speech.      REVIEW OF SYSTEMS: 14 point ROS negative other than the symptoms noted above in the HPI.    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 Mansfield.     Adrenal mass (H)      Anemia     thought due to heavy menses.     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  Check in May. SCP started.  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     SHREYA on CPAP 12/17/2015    Stopped using CPAP the Fall of 2016 after weight  "loss as even at lower pressure/adjustment couldn't tolerate the cpap. Resting well, cautioned to watch for any signs of fatigue and consider \"titration study\" in the future to see if cpap is still required at her new weight.     Pneumonia     hx of recurrent pneumonia issues/respiratory infections.     Polycystic ovary syndrome     able to get pregnant, not on meds.     Pulmonary embolism (H)  or so    briefly on wafarin.     Sleep apnea     cpap     Vitamin D deficiency    MHTFR mutation with h/o mutiple miscarriages.    PAST SURGICAL HISTORY:   Past Surgical History:   Procedure Laterality Date     ADRENALECTOMY Right 2018    Procedure: ADRENALECTOMY;  Right Adrenalectomy,  Embolize Liver , Anesthesia Block;  Surgeon: Warren Mansfield MD;  Location: [facility] OR     ADRENALECTOMY       ADRENALECTOMY Right       SECTION      twice      SECTION      2      SECTION      x2     EMBOLECTOMY ABDOMEN N/A 2018    Procedure: EMBOLECTOMY ABDOMEN;;  Surgeon: Ector Mcintyre MD;  Location: [facility] OR     EXTRACORPOREAL SHOCK WAVE LITHOTRIPSY (ESWL)       GASTRIC BYPASS       WV CYSTO/URETERO W/LITHOTRIPSY &INDWELL STENT INSRT Left 2018    Procedure: CYSTOSCOPY, LEFT URETEROSCOPY LASER LITHOTRIPSY STENT INSERTION;  Surgeon: Skyler Delvalle MD;  Location: [facility];  Service: Urology     WV LAP GASTRIC BYPASS/DERICK-EN-Y N/A 2016    Procedure: GASTRIC BYPASS LAPAROSCOPIC DERICK-EN-Y;  Surgeon: Randy Sutherland MD;  Location: [facility];  Service: General     TUBAL LIGATION       WISDOM TOOTH EXTRACTION Bilateral       SOCIAL HISTORY:   Social History     Tobacco Use     Smoking status: Never Smoker     Smokeless tobacco: Never Used   Substance Use Topics     Alcohol use: Yes     Comment: occ.     Drug use: No     FAMILY HISTORY:   Family History   Problem Relation Age of Onset     Depression Paternal Grandmother      ALLERGIES:   Allergies   Allergen " Reactions     Bee Venom Swelling     Ibuprofen Hives and Swelling     CURRENT MEDICATIONS:   Current Outpatient Medications   Medication Instructions     acetaminophen (TYLENOL) 650 mg, Oral, EVERY 4 HOURS PRN     buPROPion (WELLBUTRIN SR) 150 mg, Oral     calcitRIOL (ROCALTROL) 0.5 MCG capsule No dose, route, or frequency recorded.     calcium carbonate 500 mg (elemental) (OSCAL) 500 mg, Oral, 2 TIMES DAILY     clonazePAM (KLONOPIN) 0.5 mg, Oral, 2 TIMES DAILY PRN     diazepam (VALIUM) 5 mg, Oral, ONCE PRN     fludrocortisone (FLORINEF) 0.2 mg, Oral, DAILY     FLUoxetine (PROZAC) 60 mg, Oral, DAILY     gabapentin (NEURONTIN) 900 mg, Oral, 3 TIMES DAILY     hydrocortisone (CORTEF) 10 MG tablet Take 3 tablets (30 MG) every morning and take 2 tablets (20 MG) by mouth every day at 2 PM. Additional as directed.     levothyroxine (SYNTHROID/LEVOTHROID) 125 mcg, Oral, DAILY     ondansetron (ZOFRAN) 8 mg, Oral, EVERY 8 HOURS PRN     prochlorperazine (COMPAZINE) 5 mg, Oral, EVERY 8 HOURS PRN     temazepam (RESTORIL) 15 mg, Oral, AT BEDTIME PRN     UNABLE TO FIND Other, medical cannabis (Patient's own supply. Not a prescription)            PHYSICAL EXAMINATION:  Vital signs: /73 (BP Location: Right arm, Patient Position: Sitting, Cuff Size: Adult Regular)   Pulse 95   Temp 98.6  F (37  C) (Oral)   Resp 16   Wt 70.7 kg (155 lb 12.8 oz)   SpO2 99%   BMI 26.74 kg/m     Gen: NAD, sitting in the chair  HEENT: Moist oral mucosa, supple neck, no LAD  Cards: RRR, no RMG, S1/S2 normal  Pulm: CTABL, no wheezing or rhonchi  Abdo: Soft, nontender, nondistended, positive bowel sounds, no hepatosplenomegaly  Neuro: No gross neurologic deficits are noted  Extr: No edema bilaterally, preserved range of motion  Psych: Appropriate behavior    LABORATORY DATA:   Results for VALERY PANDYA (MRN [id number]) as of 6/7/2021 09:08  Results for VALERY PANDYA (MRN [id number]) as of 7/5/2021 08:07   Ref. Range 6/7/2021 11:48    Cortisol Free Duration Urine Latest Units: h 24   Cortisol Free Urine Latest Ref Range: <=45.0 ug/d 117.4 (H)   Cortisol Free Urine Intrepretation Unknown SEE NOTE   Cortisol Free Urine Random Latest Units: ug/L 54.60   Cortisol ug/g creatinine Latest Units: ug/g .02   CORTISOL CORTISONE FREE 24 HOUR URINE Unknown Rpt (A)   Creatinine Urine/Volume Latest Units: mg/dL 53   Creatinine Urine/24hr Latest Ref Range: 700 - 1,600 mg/d 1,140               DHEAS was 740 on 6/5/18, and has been <15 on 7/12/18, 8/20/18, 9/19/18, 10/23/18, 11/27/18, 1/9/19, 5/8/19, 6/18/19, 7/17/19, 11/15/19, 12/12/19, 1/22/20, 3/30/20 and 6/8/20, 7/21/20, 8/5/20, 1/20/21, 5/10/21.  Mitotane level (target 14-20): 1.8ng/dl on 8/20/18, 3.5 on 10/25/18, 6.2 on 12/4/18, 8.1 on 1/9/19 and 10.2 on 2/11/19, 14.2 on 6/18/19. 10.8 on 12/12/19.    10 on 1/24/20.   8.9 on 5/14/20.   8.6 on 7/21/20,   8.3 on 8/5/20,   1/2021 Mitotane =14.0   6/4/21 Mitotane = 12.4     10.5 on 5/10/21.  Labs from [facility] 9/24/19 - WBC 4200, ANC 2600, Hb 10.4, Platelets 218K, lytes WNL, creat 0.61, Calcium 8, albumin 3.5, LFTs normal, mitotane level 18.8, cortisol 25.8, ACTH<5, aldosterone 4.9, free T4 1.17, TSH 0.07, DHEAS <15, renin plasma 10.6.     IMAGING DATA:  6/4/21                                                                   IMPRESSION:  1. Stable postoperative changes of right adrenalectomy. No evidence  for local recurrence. No convincing evidence for metastatic disease in  the chest, abdomen, pelvis and bones.  2. No significant interval change in decreased cortical parenchyma  enhancement about the superior pole of the right kidney with  associated cortical retraction likely related to prior  infarction/radiation. Attention on follow-up studies.  3. Additional incidental findings as described above.       ASSESSMENT AND PLAN: A 35-year-old lady with right-sided functioning high-risk adrenocortical carcinoma.     Adrenocortical carcinoma:  - Started on  adjuvant mitotane in July 2018 based on her presentation and tumor characteristics including invasion of the adrenal capsule, high mitotic rate, possibly positive margins, and high Ki67, which could result in a rate of recurrence as high as 40%.    The mitotane was stopped by Dr. Hilton and endocrinologic oncologist from [facility].  She had a level drawn on 5/10/2021, 6 days after discontinuation of mitotane with level of 10.5.  She informs that Dr. Liang would like to continue with mitotane levels every month and interval imaging every 3 months from now .  Thus we will continue to monitor every 4 week  mitotane level, CBC, comprehensive panel, TSH, FT4, DHEAS, ACTH, periodic cholesterol panel, and 24-hour Urine Free Cortisol.   We will plan with monthly follow-up with midlevel providers during this period of time, CT chest abdomen pelvis in 1 month (the last scan was done 2 months ago) and another scan in 3 months, followed by appointment with Dr. Shaw in 4 months from now.    We will continue to hydrocortisone at 20/10.  She continues to see declines in the mitotane levels.  She is currently not taking it.  We will continue to Florinef.  I will see her back in 1 month time to reevaluate the hormone levels.  We will repeat the urine cortisol.  My assessment is that the elevated urine cortisol last month was due to the exogenous hydrocortisone that she was taking.  However we will evaluate this value now.  We will do scans in 2 months time.    #Speech: Today she asked me for a referral to speech therapy because she feels some dysarthria and word finding difficulties since taking the mitotane.  She appears to be verbalizing things normally today but I will have her evaluated and see what we can do.    25 minutes in person with the patient and evaluating lab values.      Amando Shaw MD          Again, thank you for allowing me to participate in the care of your patient.         Sincerely,        Amando Shaw MD

## 2021-07-05 NOTE — NURSING NOTE
Chief Complaint   Patient presents with     Blood Draw     Labs drawn via  by rn in lab. VS taken.     Labs collected from venipuncture by RN. Vitals taken. Checked in for appointment(s).    Jorge Cee RN

## 2021-07-05 NOTE — NURSING NOTE
"Oncology Rooming Note    July 5, 2021 7:58 AM   Suzy Rodríguez is a 35 year old female who presents for:    Chief Complaint   Patient presents with     Blood Draw     Labs drawn via  by rn in lab. VS taken.     Oncology Clinic Visit     CARCINOMA, ADRENAL CORTICAL     Initial Vitals: /73 (BP Location: Right arm, Patient Position: Sitting, Cuff Size: Adult Regular)   Pulse 95   Temp 98.6  F (37  C) (Oral)   Resp 16   Wt 70.7 kg (155 lb 12.8 oz)   SpO2 99%   BMI 26.74 kg/m   Estimated body mass index is 26.74 kg/m  as calculated from the following:    Height as of 2/24/21: 1.626 m (5' 4\").    Weight as of this encounter: 70.7 kg (155 lb 12.8 oz). Body surface area is 1.79 meters squared.  No Pain (0) Comment: Data Unavailable   No LMP recorded. (Menstrual status: IUD).  Allergies reviewed: Yes  Medications reviewed: Yes    Medications: MEDICATION REFILLS NEEDED TODAY. Provider was notified.  Pharmacy name entered into [facility]:    [facility] [place] - [address] [id number] - [place], [place] - [facility] [facility] [address]    Clinical concerns: refill Gabapentin.        Antonietta Mello CMA              "

## 2021-07-05 NOTE — PATIENT INSTRUCTIONS
1. Keep HC at 20/10  2. Will taper when mitotane levels approach zero  3. Labs in one month  4. Scans in two months  5. Speech eval - persistent word finding since cancer therapy.

## 2021-07-06 LAB — MISCELLANEOUS TEST: NORMAL

## 2021-07-08 LAB
ACTH PLAS-MCNC: 45 PG/ML
DHEA-S SERPL-MCNC: <15 UG/DL (ref 35–430)

## 2021-07-14 PROBLEM — I15.2 HYPERTENSION DUE TO ENDOCRINE DISORDER: Status: RESOLVED | Noted: 2018-06-18 | Resolved: 2018-11-21

## 2021-07-20 LAB — LAB SCANNED RESULT: NORMAL

## 2021-07-22 NOTE — LETTER
Letter by Faby Rae MD at      Author: Faby Rae MD Service: -- Author Type: --    Filed:  Encounter Date: 2021 Status: (Other)         Suzy ISABEL Rodríguez  [address]  [facility] [address]      2021      Dear Suzy Rodríguez,   : 1985      This letter is in regards to the appointment that you had scheduled on 21 at the [facility] with Dr. Rae.     The [facility] strives to see all patients in a timely manner and we need your help to achieve this.  The above-mentioned appointment was missed and we do not have record of a cancellation by you.  Whenever possible, we request appointment cancellations at least 72 hours in advance.  This time allows us to offer the appointment to another patient in need.      If you feel you have received this letter in error, or if you need to reschedule this appointment, please call our office so that we may update our records.      Sincerely,    [facility]

## 2021-08-02 ENCOUNTER — VIRTUAL VISIT (OUTPATIENT)
Dept: ONCOLOGY | Facility: CLINIC | Age: 36
End: 2021-08-02
Attending: INTERNAL MEDICINE
Payer: COMMERCIAL

## 2021-08-02 DIAGNOSIS — C74.00 ADRENAL CORTICAL ADENOCARCINOMA, UNSPECIFIED LATERALITY (H): Primary | ICD-10-CM

## 2021-08-02 PROCEDURE — 99214 OFFICE O/P EST MOD 30 MIN: CPT | Mod: 95 | Performed by: INTERNAL MEDICINE

## 2021-08-02 RX ORDER — PREDNISOLONE ACETATE 10 MG/ML
1-2 SUSPENSION/ DROPS OPHTHALMIC 4 TIMES DAILY
COMMUNITY
End: 2021-09-03

## 2021-08-02 NOTE — PROGRESS NOTES
"  [facility] Oncology Followup  8/2/21    REFERRING PROVIDER: Warren Mansfield MD from [facility] clinic.      REASON FOR CURRENT VISIT: Follow-up for adrenocortical carcinoma,    ONCOLOGIC HISTORY:  1. 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   - CT abdomen and pelvis stone protocol without contrast 5 8019 showed \"9.2 x 8 cm heterogeneous right upper quadrant mass with small foci of calcification within it which is likely arising from the adrenal gland though inseparable from liver and upper pole of right kidney. It is incompletely evaluated on this noncontrast study. 3 x 2.6 cm mass right lobe of liver on image #85 also incompletely evaluated. 6 x 4 x 4 mm upper third left ureteral obstructing stone with mild to moderate secondary hydronephrosis. Collection of stones at lower pole left kidney extending over an area of approximately 6 x 7 x 4 mm. Additional nonobstructing 1 mm stone upper pole left kidney. 2 mm nonobstructing stone noted upper pole right kidney with no hydronephrosis on the right. Postop change consistent with gastric bypass. Noncontrast images of spleen, left adrenal gland, gallbladder, and pancreas unremarkable. No aneurysm. No adenopathy.\"  - Seen by Dr. Delvalle at [facility] Urology clinic. Referred to Dr. Alvino Patel and then Dr. Warren Mansfield.  - Endocrinology team directed workup showed high DHEAS level of 740 pre-surgery.   - Right open adrenalectomy and hepatic mobilization performed by Dr. Warren Mansfield on 6/25/2018. Pathology showed adrenocortical carcinoma measuring 11.3 cm, weighing 413 g with extension into or through the adrenal capsule negative lymphovascular invasion, tumor necrosis present, margins involved by tumor, no regional lymph nodes identified, pathologic stage " T2 NX.  pathology review reveals low grade ACC.  - DHEAS normalized postsurgery.   - Adjuvant Mitotane started on 7/24/18. Dose increased to 2000mg TID around 10/23/18 due to persistently low troughs. Held 1/16/19 for two weeks and resumed 1/30 at 1000 mg po BID due to very poor tolerance of higher doses. Highest mitotane level was 10.2 on 2/11/19. Mitotane troughs did not rise above 10 despite increase in dose to 1500 BID and then 1500+2000. Started 2000mg BID on 10/28/20. Increased to [phone] since around Christmas 2020.   - Saw radiation oncology at [facility], radiation oncology at [facility], as well as endocrine oncology (Dr. Huertas) at [facility].   - S/p adjuvant RT by Dr. Monsivais - 5040 cGy over 30 fr (8/24/18-10/6/18).  - 2/11/19: OSH CT C/A/P and MRI brain with contrast - no evidence of disease recurrence.   - 06/08/20, 09/14/20, 12/7/20, 3/16/2021: CT C/A/P with contrast - AFUA.    INTERVAL HISTORY:  Ms. Rodríguez is a 35-year-old woman with history notable for right-sided functioning adrenocortical carcinoma (diagnosed in May 2018), who was  on adjuvant mitotane. Her mitotane [phone] since around Christmas 2020.  She is now off the mitotane and doing well. She is on Adrenal replacement and stable.     Mild back pain in left flank.   Had staph infection in eye- got prednisone drops.     -125/70-80  No symptoms of light headedness while at rest.   Appt w Dr Huertas on Sept 7      REVIEW OF SYSTEMS: 14 point ROS negative other than the symptoms noted above in the HPI.    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 Mansfield.     Adrenal mass (H)      Anemia     thought due to heavy menses.     Calculus of ureter 5/9/2018     Carcinoma, adrenal cortical (H) 7/18/2018     Chocolate cyst of ovary 2011     Clotting disorder (H)      Depression      GERD (gastroesophageal  "reflux disease)      History of miscarriage      Hypertension due to endocrine disorder 2018     Kidney stones     passed on their own     Malignant neoplasm of cortex of right adrenal gland (H) 2018    EOTD; 18.  Check in May. SCP started.  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     SHREYA on CPAP 2015    Stopped using CPAP the  after weight loss as even at lower pressure/adjustment couldn't tolerate the cpap. Resting well, cautioned to watch for any signs of fatigue and consider \"titration study\" in the future to see if cpap is still required at her new weight.     Pneumonia     hx of recurrent pneumonia issues/respiratory infections.     Polycystic ovary syndrome     able to get pregnant, not on meds.     Pulmonary embolism (H)  or so    briefly on wafarin.     Sleep apnea     cpap     Vitamin D deficiency    MHTFR mutation with h/o mutiple miscarriages.    PAST SURGICAL HISTORY:   Past Surgical History:   Procedure Laterality Date     ADRENALECTOMY Right 2018    Procedure: ADRENALECTOMY;  Right Adrenalectomy,  Embolize Liver , Anesthesia Block;  Surgeon: Warren Mansfield MD;  Location: [facility] OR     ADRENALECTOMY       ADRENALECTOMY Right       SECTION      twice      SECTION      2      SECTION      x2     EMBOLECTOMY ABDOMEN N/A 2018    Procedure: EMBOLECTOMY ABDOMEN;;  Surgeon: Ector Mcintyre MD;  Location: [facility] OR     EXTRACORPOREAL SHOCK WAVE LITHOTRIPSY (ESWL)       GASTRIC BYPASS   "     CT CYSTO/URETERO W/LITHOTRIPSY &INDWELL STENT INSRT Left 5/11/2018    Procedure: CYSTOSCOPY, LEFT URETEROSCOPY LASER LITHOTRIPSY STENT INSERTION;  Surgeon: Skyler Delvalle MD;  Location: [facility];  Service: Urology     CT LAP GASTRIC BYPASS/DERICK-EN-Y N/A 6/8/2016    Procedure: GASTRIC BYPASS LAPAROSCOPIC DERICK-EN-Y;  Surgeon: Randy Sutherland MD;  Location: [facility];  Service: General     TUBAL LIGATION       WISDOM TOOTH EXTRACTION Bilateral       SOCIAL HISTORY:   Social History     Tobacco Use     Smoking status: Never Smoker     Smokeless tobacco: Never Used   Substance Use Topics     Alcohol use: Yes     Comment: occ.     Drug use: No     FAMILY HISTORY:   Family History   Problem Relation Age of Onset     Depression Paternal Grandmother      Diabetes Mother      Diabetes Father      Hypertension Father      Chronic Obstructive Pulmonary Disease Father      Cancer Maternal Grandmother         gastric     Urolithiasis Maternal Grandmother      Heart Disease Maternal Grandfather      Cancer Maternal Grandfather         lung     Heart Disease Paternal Grandfather      Breast Cancer No family hx of      Colon Cancer No family hx of      Prostate Cancer No family hx of      Cerebrovascular Disease No family hx of      Clotting Disorder No family hx of      Gout No family hx of      ALLERGIES:   Allergies   Allergen Reactions     Bee Venom Swelling     Ibuprofen Hives and Swelling     CURRENT MEDICATIONS:   Current Outpatient Medications   Medication Instructions     acetaminophen (TYLENOL) 650 mg, Oral, EVERY 4 HOURS PRN     buPROPion (WELLBUTRIN SR) 150 mg, Oral     calcitRIOL (ROCALTROL) 0.5 MCG capsule No dose, route, or frequency recorded.     calcium carbonate 500 mg (elemental) (OSCAL) 500 mg, Oral, 2 TIMES DAILY     clonazePAM (KLONOPIN) 0.5 mg, Oral, 2 TIMES DAILY PRN     diazepam (VALIUM) 5 mg, Oral, ONCE PRN     fludrocortisone (FLORINEF) 0.2 mg, Oral, DAILY     FLUoxetine  (PROZAC) 60 mg, Oral, DAILY     gabapentin (NEURONTIN) 900 mg, Oral, 3 TIMES DAILY     hydrocortisone (CORTEF) 10 MG tablet Take 3 tablets (30 MG) every morning and take 2 tablets (20 MG) by mouth every day at 2 PM. Additional as directed.     levothyroxine (SYNTHROID/LEVOTHROID) 125 mcg, Oral, DAILY     ondansetron (ZOFRAN) 8 mg, Oral, EVERY 8 HOURS PRN     prochlorperazine (COMPAZINE) 5 mg, Oral, EVERY 8 HOURS PRN     temazepam (RESTORIL) 15 mg, Oral, AT BEDTIME PRN     UNABLE TO FIND Other, medical cannabis (Patient's own supply. Not a prescription)       PHYSICAL EXAMINATION:  Vital signs: There were no vitals taken for this visit.   Gen: NAD, on video no distress  No other exam    DHEAS was 740 on 6/5/18, and has been <15 on 7/12/18, 8/20/18, 9/19/18, 10/23/18, 11/27/18, 1/9/19, 5/8/19, 6/18/19, 7/17/19, 11/15/19, 12/12/19, 1/22/20, 3/30/20 and 6/8/20, 7/21/20, 8/5/20, 1/20/21, 5/10/21, 6/4/21, 7/5/21)  Mitotane level (target 14-20): 1.8ng/dl on 8/20/18, 3.5 on 10/25/18, 6.2 on 12/4/18, 8.1 on 1/9/19 and 10.2 on 2/11/19, 14.2 on 6/18/19. 10.8 on 12/12/19.    10 on 1/24/20.   8.9 on 5/14/20.   8.6 on 7/21/20,   8.3 on 8/5/20,   1/2021 Mitotane =14.0   6/4/21 Mitotane = 12.4   7/5/21 Mitotane-10.1    10.5 on 5/10/21.  Labs from [facility] 9/24/19 - WBC 4200, ANC 2600, Hb 10.4, Platelets 218K, lytes WNL, creat 0.61, Calcium 8, albumin 3.5, LFTs normal, mitotane level 18.8, cortisol 25.8, ACTH<5, aldosterone 4.9, free T4 1.17, TSH 0.07, DHEAS <15, renin plasma 10.6.     IMAGING DATA:  6/4/21                                                                   IMPRESSION:  1. Stable postoperative changes of right adrenalectomy. No evidence  for local recurrence. No convincing evidence for metastatic disease in  the chest, abdomen, pelvis and bones.  2. No significant interval change in decreased cortical parenchyma  enhancement about the superior pole of the right kidney with  associated cortical retraction likely related  to prior  infarction/radiation. Attention on follow-up studies.  3. Additional incidental findings as described above.       ASSESSMENT AND PLAN: A 35-year-old lady with right-sided functioning high-risk adrenocortical carcinoma.     Adrenocortical carcinoma:  - Started on adjuvant mitotane in July 2018 based on her presentation and tumor characteristics including invasion of the adrenal capsule, high mitotic rate, possibly positive margins, and high Ki67, which could result in a rate of recurrence as high as 40%.    The mitotane was stopped by Dr. Hilton and endocrinologic oncologist from [facility].  She had a level drawn on 5/10/2021, 6 days after discontinuation of mitotane with level of 10.5.  She informs that Dr. Liang would like to continue with mitotane levels every month and interval imaging every 3 months from now .  Thus we will continue to monitor every 4 week  mitotane level, CBC, comprehensive panel, TSH, FT4, DHEAS, ACTH, periodic cholesterol panel, and 24-hour Urine Free Cortisol.   We will plan with monthly follow-up  during this period of time, CT chest abdomen pelvis in 1 month (the last scan was done 2 months ago) and another scan in 3 months, followed by appointment with Dr. Sahw in 4 months from now.    We will continue to hydrocortisone at 20/10.  She continues to see declines in the mitotane levels.  She is currently not taking it.  We will continue to have her take Florinef.  I will see her back in 1 month time to reevaluate the hormone levels.  We will repeat the urine cortisol.  However we will evaluate this value now.  We will do scans in 1 months time.  She has an appt with Dr Huertas in September and we will move the scans up a bit for this reason, and for the reason that she has mild flank pain - doubtful that this is disease recurrence but given the nature of ACC it merits evaluation.       #Speech: Speech therapy eval pending.       25 minutes on video with the patient  and evaluating lab values.

## 2021-08-02 NOTE — LETTER
"    8/2/2021         RE: Suzy Rodríguez  [address]  [facility] [address]        Dear Colleague,    Thank you for referring your patient, Suzy Rodríguez, to the [facility]. Please see a copy of my visit note below.      [facility] Oncology Followup  8/2/21    REFERRING PROVIDER: Warren Mansfield MD from [facility] Urologic Oncology clinic.      REASON FOR CURRENT VISIT: Follow-up for adrenocortical carcinoma,    ONCOLOGIC HISTORY:  1. 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   - CT abdomen and pelvis stone protocol without contrast 5 2249 showed \"9.2 x 8 cm heterogeneous right upper quadrant mass with small foci of calcification within it which is likely arising from the adrenal gland though inseparable from liver and upper pole of right kidney. It is incompletely evaluated on this noncontrast study. 3 x 2.6 cm mass right lobe of liver on image #85 also incompletely evaluated. 6 x 4 x 4 mm upper third left ureteral obstructing stone with mild to moderate secondary hydronephrosis. Collection of stones at lower pole left kidney extending over an area of approximately 6 x 7 x 4 mm. Additional nonobstructing 1 mm stone upper pole left kidney. 2 mm nonobstructing stone noted upper pole right kidney with no hydronephrosis on the right. Postop change consistent with gastric bypass. Noncontrast images of spleen, left adrenal gland, gallbladder, and pancreas unremarkable. No aneurysm. No adenopathy.\"  - Seen by Dr. Delvalle at [facility] Urology clinic. Referred to Dr. Alvino Patel and then Dr. Warren Mansfield.  - Endocrinology team directed workup showed high DHEAS level of 740 pre-surgery.   - Right open adrenalectomy and hepatic mobilization performed by Dr. Warren Mansfield on 6/25/2018. Pathology " showed adrenocortical carcinoma measuring 11.3 cm, weighing 413 g with extension into or through the adrenal capsule negative lymphovascular invasion, tumor necrosis present, margins involved by tumor, no regional lymph nodes identified, pathologic stage T2 NX.  pathology review reveals low grade ACC.  - DHEAS normalized postsurgery.   - Adjuvant Mitotane started on 7/24/18. Dose increased to 2000mg TID around 10/23/18 due to persistently low troughs. Held 1/16/19 for two weeks and resumed 1/30 at 1000 mg po BID due to very poor tolerance of higher doses. Highest mitotane level was 10.2 on 2/11/19. Mitotane troughs did not rise above 10 despite increase in dose to 1500 BID and then 1500+2000. Started 2000mg BID on 10/28/20. Increased to [phone] since around Christmas 2020.   - Saw radiation oncology at [facility], radiation oncology at [facility], as well as endocrine oncology (Dr. Huertas) at [facility].   - S/p adjuvant RT by Dr. Monsivais - 5040 cGy over 30 fr (8/24/18-10/6/18).  - 2/11/19: OSH CT C/A/P and MRI brain with contrast - no evidence of disease recurrence.   - 06/08/20, 09/14/20, 12/7/20, 3/16/2021: CT C/A/P with contrast - AFUA.    INTERVAL HISTORY:  Ms. Rodríguez is a 35-year-old woman with history notable for right-sided functioning adrenocortical carcinoma (diagnosed in May 2018), who was  on adjuvant mitotane. Her mitotane [phone] since around Christmas 2020.  She is now off the mitotane and doing well. She is on Adrenal replacement and stable.     Mild back pain in left flank.   Had staph infection in eye- got prednisone drops.     -125/70-80  No symptoms of light headedness while at rest.   Appt w Dr Huertas on Sept 7      REVIEW OF SYSTEMS: 14 point ROS negative other than the symptoms noted above in the HPI.    PAST MEDICAL HISTORY:  Past Medical History:   Diagnosis Date     Adrenal cortex cancer, right (H) 6/30/2018    Resected 6/25/18,   "Weight.     Adrenal mass (H)      Anemia     thought due to heavy menses.     Calculus of ureter 2018     Carcinoma, adrenal cortical (H) 2018     Chocolate cyst of ovary      Clotting disorder (H)      Depression      GERD (gastroesophageal reflux disease)      History of miscarriage      Hypertension due to endocrine disorder 2018     Kidney stones     passed on their own     Malignant neoplasm of cortex of right adrenal gland (H) 2018    EOTD; 18.  Check in May. SCP started.  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     SHREYA on CPAP 2015    Stopped using CPAP the  after weight loss as even at lower pressure/adjustment couldn't tolerate the cpap. Resting well, cautioned to watch for any signs of fatigue and consider \"titration study\" in the future to see if cpap is still required at her new weight.     Pneumonia     hx of recurrent pneumonia issues/respiratory infections.     Polycystic ovary syndrome     able to get pregnant, not on meds.     Pulmonary embolism (H)  or so    briefly on wafarin.     Sleep apnea     cpap     Vitamin D deficiency    MHTFR mutation with h/o mutiple miscarriages.    PAST SURGICAL HISTORY:   Past Surgical History:   Procedure Laterality Date     ADRENALECTOMY Right 2018    Procedure: ADRENALECTOMY;  Right Adrenalectomy,  Embolize Liver , Anesthesia Block;  Surgeon: Warren Mansfield MD;  Location: [facility] OR     ADRENALECTOMY       ADRENALECTOMY Right       SECTION      twice     "  SECTION      2      SECTION      x2     EMBOLECTOMY ABDOMEN N/A 2018    Procedure: EMBOLECTOMY ABDOMEN;;  Surgeon: Ector Mcintyre MD;  Location: [facility] OR     EXTRACORPOREAL SHOCK WAVE LITHOTRIPSY (ESWL)       GASTRIC BYPASS       VT CYSTO/URETERO W/LITHOTRIPSY &INDWELL STENT INSRT Left 2018    Procedure: CYSTOSCOPY, LEFT URETEROSCOPY LASER LITHOTRIPSY STENT INSERTION;  Surgeon: Skyler Delvalle MD;  Location: [facility];  Service: Urology     VT LAP GASTRIC BYPASS/DERICK-EN-Y N/A 2016    Procedure: GASTRIC BYPASS LAPAROSCOPIC DERICK-EN-Y;  Surgeon: Randy Sutherland MD;  Location: [facility];  Service: General     TUBAL LIGATION       WISDOM TOOTH EXTRACTION Bilateral       SOCIAL HISTORY:   Social History     Tobacco Use     Smoking status: Never Smoker     Smokeless tobacco: Never Used   Substance Use Topics     Alcohol use: Yes     Comment: occ.     Drug use: No     FAMILY HISTORY:   Family History   Problem Relation Age of Onset     Depression Paternal Grandmother      Diabetes Mother      Diabetes Father      Hypertension Father      Chronic Obstructive Pulmonary Disease Father      Cancer Maternal Grandmother         gastric     Urolithiasis Maternal Grandmother      Heart Disease Maternal Grandfather      Cancer Maternal Grandfather         lung     Heart Disease Paternal Grandfather      Breast Cancer No family hx of      Colon Cancer No family hx of      Prostate Cancer No family hx of      Cerebrovascular Disease No family hx of      Clotting Disorder No family hx of      Gout No family hx of      ALLERGIES:   Allergies   Allergen Reactions     Bee Venom Swelling     Ibuprofen Hives and Swelling     CURRENT MEDICATIONS:   Current Outpatient Medications   Medication Instructions     acetaminophen (TYLENOL) 650 mg, Oral, EVERY 4 HOURS PRN     buPROPion (WELLBUTRIN SR) 150 mg, Oral     calcitRIOL (ROCALTROL) 0.5 MCG capsule No dose, route, or frequency  recorded.     calcium carbonate 500 mg (elemental) (OSCAL) 500 mg, Oral, 2 TIMES DAILY     clonazePAM (KLONOPIN) 0.5 mg, Oral, 2 TIMES DAILY PRN     diazepam (VALIUM) 5 mg, Oral, ONCE PRN     fludrocortisone (FLORINEF) 0.2 mg, Oral, DAILY     FLUoxetine (PROZAC) 60 mg, Oral, DAILY     gabapentin (NEURONTIN) 900 mg, Oral, 3 TIMES DAILY     hydrocortisone (CORTEF) 10 MG tablet Take 3 tablets (30 MG) every morning and take 2 tablets (20 MG) by mouth every day at 2 PM. Additional as directed.     levothyroxine (SYNTHROID/LEVOTHROID) 125 mcg, Oral, DAILY     ondansetron (ZOFRAN) 8 mg, Oral, EVERY 8 HOURS PRN     prochlorperazine (COMPAZINE) 5 mg, Oral, EVERY 8 HOURS PRN     temazepam (RESTORIL) 15 mg, Oral, AT BEDTIME PRN     UNABLE TO FIND Other, medical cannabis (Patient's own supply. Not a prescription)       PHYSICAL EXAMINATION:  Vital signs: There were no vitals taken for this visit.   Gen: NAD, on video no distress  No other exam    DHEAS was 740 on 6/5/18, and has been <15 on 7/12/18, 8/20/18, 9/19/18, 10/23/18, 11/27/18, 1/9/19, 5/8/19, 6/18/19, 7/17/19, 11/15/19, 12/12/19, 1/22/20, 3/30/20 and 6/8/20, 7/21/20, 8/5/20, 1/20/21, 5/10/21, 6/4/21, 7/5/21)  Mitotane level (target 14-20): 1.8ng/dl on 8/20/18, 3.5 on 10/25/18, 6.2 on 12/4/18, 8.1 on 1/9/19 and 10.2 on 2/11/19, 14.2 on 6/18/19. 10.8 on 12/12/19.    10 on 1/24/20.   8.9 on 5/14/20.   8.6 on 7/21/20,   8.3 on 8/5/20,   1/2021 Mitotane =14.0   6/4/21 Mitotane = 12.4   7/5/21 Mitotane-10.1    10.5 on 5/10/21.  Labs from [facility] 9/24/19 - WBC 4200, ANC 2600, Hb 10.4, Platelets 218K, lytes WNL, creat 0.61, Calcium 8, albumin 3.5, LFTs normal, mitotane level 18.8, cortisol 25.8, ACTH<5, aldosterone 4.9, free T4 1.17, TSH 0.07, DHEAS <15, renin plasma 10.6.     IMAGING DATA:  6/4/21                                                                   IMPRESSION:  1. Stable postoperative changes of right adrenalectomy. No evidence  for local recurrence. No  convincing evidence for metastatic disease in  the chest, abdomen, pelvis and bones.  2. No significant interval change in decreased cortical parenchyma  enhancement about the superior pole of the right kidney with  associated cortical retraction likely related to prior  infarction/radiation. Attention on follow-up studies.  3. Additional incidental findings as described above.       ASSESSMENT AND PLAN: A 35-year-old lady with right-sided functioning high-risk adrenocortical carcinoma.     Adrenocortical carcinoma:  - Started on adjuvant mitotane in July 2018 based on her presentation and tumor characteristics including invasion of the adrenal capsule, high mitotic rate, possibly positive margins, and high Ki67, which could result in a rate of recurrence as high as 40%.    The mitotane was stopped by Dr. Hilton and endocrinologic oncologist from [facility].  She had a level drawn on 5/10/2021, 6 days after discontinuation of mitotane with level of 10.5.  She informs that Dr. Liang would like to continue with mitotane levels every month and interval imaging every 3 months from now .  Thus we will continue to monitor every 4 week  mitotane level, CBC, comprehensive panel, TSH, FT4, DHEAS, ACTH, periodic cholesterol panel, and 24-hour Urine Free Cortisol.   We will plan with monthly follow-up  during this period of time, CT chest abdomen pelvis in 1 month (the last scan was done 2 months ago) and another scan in 3 months, followed by appointment with Dr. Shaw in 4 months from now.    We will continue to hydrocortisone at 20/10.  She continues to see declines in the mitotane levels.  She is currently not taking it.  We will continue to have her take Florinef.  I will see her back in 1 month time to reevaluate the hormone levels.  We will repeat the urine cortisol.  However we will evaluate this value now.  We will do scans in 1 months time.  She has an appt with Dr Huertas in September and we will move the  scans up a bit for this reason, and for the reason that she has mild flank pain - doubtful that this is disease recurrence but given the nature of ACC it merits evaluation.       #Speech: Speech therapy eval pending.       25 minutes on video with the patient and evaluating lab values.          Suzy is a 35 year old who is being evaluated via a billable video visit.        How would you like to obtain your AVS? MyChart  If the video visit is dropped, the invitation should be resent by: Text to cell phone: [phone]  Will anyone else be joining your video visit? No     Vitals - Patient Reported  Weight (Patient Reported): 68.9 kg (152 lb)  Pain Score: Moderate Pain (5)  Pain Loc: Low Back     Herbie RMA            15 minutes on Video  25 minutes total      Again, thank you for allowing me to participate in the care of your patient.        Sincerely,        Amando Shaw MD

## 2021-08-02 NOTE — PROGRESS NOTES
Suzy is a 35 year old who is being evaluated via a billable video visit.        How would you like to obtain your AVS? MyChart  If the video visit is dropped, the invitation should be resent by: Text to cell phone: [phone]  Will anyone else be joining your video visit? No     Vitals - Patient Reported  Weight (Patient Reported): 68.9 kg (152 lb)  Pain Score: Moderate Pain (5)  Pain Loc: Low Back     Herbie RMA            15 minutes on Video  25 minutes total

## 2021-08-03 PROBLEM — C7A.8 OTHER MALIGNANT NEUROENDOCRINE TUMORS (H): Status: RESOLVED | Noted: 2018-07-30 | Resolved: 2019-11-06

## 2021-08-03 PROBLEM — E27.8 ADRENAL MASS (H): Status: RESOLVED | Noted: 2018-05-09 | Resolved: 2018-08-01

## 2021-08-06 ENCOUNTER — DOCUMENTATION ONLY (OUTPATIENT)
Dept: LAB | Facility: CLINIC | Age: 36
End: 2021-08-06
Payer: COMMERCIAL

## 2021-08-06 NOTE — PROGRESS NOTES
Patient came in today for labs, but she said we needed more orders than we have.  She stated should have orders for TSH, FT4, Mitotane level, CBC, and Comp metabolic panel.  We did not draw her today - patient needed to collect 24 hour urine, so will just have her blood drawn when she drops that off next week.   Please place future/standing orders.  Thank you!

## 2021-08-07 LAB
D DIMER PPP FEU-MCNC: 0.3 UG/ML FEU (ref 0–0.5)
ERYTHROCYTE [DISTWIDTH] IN BLOOD BY AUTOMATED COUNT: 13.1 % (ref 10–15)
HCT VFR BLD AUTO: 43.5 % (ref 35–47)
HGB BLD-MCNC: 14 G/DL (ref 11.7–15.7)
MCH RBC QN AUTO: 31.2 PG (ref 26.5–33)
MCHC RBC AUTO-ENTMCNC: 32.2 G/DL (ref 31.5–36.5)
MCV RBC AUTO: 97 FL (ref 78–100)
PLATELET # BLD AUTO: 96 10E3/UL (ref 150–450)
RBC # BLD AUTO: 4.49 10E6/UL (ref 3.8–5.2)
WBC # BLD AUTO: 8 10E3/UL (ref 4–11)

## 2021-08-07 PROCEDURE — 83880 ASSAY OF NATRIURETIC PEPTIDE: CPT | Performed by: EMERGENCY MEDICINE

## 2021-08-07 PROCEDURE — 93005 ELECTROCARDIOGRAM TRACING: CPT | Performed by: EMERGENCY MEDICINE

## 2021-08-07 PROCEDURE — 85027 COMPLETE CBC AUTOMATED: CPT | Performed by: EMERGENCY MEDICINE

## 2021-08-07 PROCEDURE — 36415 COLL VENOUS BLD VENIPUNCTURE: CPT | Performed by: EMERGENCY MEDICINE

## 2021-08-07 PROCEDURE — 85379 FIBRIN DEGRADATION QUANT: CPT | Performed by: EMERGENCY MEDICINE

## 2021-08-07 PROCEDURE — 99285 EMERGENCY DEPT VISIT HI MDM: CPT | Mod: 25

## 2021-08-07 ASSESSMENT — MIFFLIN-ST. JEOR: SCORE: 1387.15

## 2021-08-08 ENCOUNTER — APPOINTMENT (OUTPATIENT)
Dept: RADIOLOGY | Facility: HOSPITAL | Age: 36
End: 2021-08-08
Attending: STUDENT IN AN ORGANIZED HEALTH CARE EDUCATION/TRAINING PROGRAM
Payer: COMMERCIAL

## 2021-08-08 ENCOUNTER — HOSPITAL ENCOUNTER (EMERGENCY)
Facility: HOSPITAL | Age: 36
Discharge: HOME | End: 2021-08-08
Attending: EMERGENCY MEDICINE | Admitting: EMERGENCY MEDICINE
Payer: COMMERCIAL

## 2021-08-08 VITALS
OXYGEN SATURATION: 98 % | SYSTOLIC BLOOD PRESSURE: 148 MMHG | DIASTOLIC BLOOD PRESSURE: 82 MMHG | HEIGHT: 64 IN | BODY MASS INDEX: 26.8 KG/M2 | WEIGHT: 157 LBS | RESPIRATION RATE: 16 BRPM | HEART RATE: 68 BPM | TEMPERATURE: 97 F

## 2021-08-08 DIAGNOSIS — R07.9 CHEST PAIN, UNSPECIFIED TYPE: ICD-10-CM

## 2021-08-08 LAB
ANION GAP SERPL CALCULATED.3IONS-SCNC: 10 MMOL/L (ref 5–18)
BNP SERPL-MCNC: 46 PG/ML (ref 0–64)
BUN SERPL-MCNC: 13 MG/DL (ref 8–22)
CALCIUM SERPL-MCNC: 8.5 MG/DL (ref 8.5–10.5)
CHLORIDE BLD-SCNC: 108 MMOL/L (ref 98–107)
CO2 SERPL-SCNC: 26 MMOL/L (ref 22–31)
CREAT SERPL-MCNC: 0.69 MG/DL (ref 0.6–1.1)
GFR SERPL CREATININE-BSD FRML MDRD: >90 ML/MIN/1.73M2
GLUCOSE BLD-MCNC: 72 MG/DL (ref 70–125)
POTASSIUM BLD-SCNC: 4.2 MMOL/L (ref 3.5–5)
SODIUM SERPL-SCNC: 144 MMOL/L (ref 136–145)
TROPONIN I SERPL-MCNC: <0.01 NG/ML (ref 0–0.29)

## 2021-08-08 PROCEDURE — 82374 ASSAY BLOOD CARBON DIOXIDE: CPT | Performed by: EMERGENCY MEDICINE

## 2021-08-08 PROCEDURE — 71046 X-RAY EXAM CHEST 2 VIEWS: CPT

## 2021-08-08 PROCEDURE — 36415 COLL VENOUS BLD VENIPUNCTURE: CPT | Performed by: EMERGENCY MEDICINE

## 2021-08-08 PROCEDURE — 84484 ASSAY OF TROPONIN QUANT: CPT | Performed by: EMERGENCY MEDICINE

## 2021-08-08 ASSESSMENT — ENCOUNTER SYMPTOMS
NAUSEA: 1
HEADACHES: 1
COUGH: 0
FEVER: 0
DIAPHORESIS: 1
SHORTNESS OF BREATH: 1
DIZZINESS: 1
CHILLS: 0

## 2021-08-08 NOTE — ED PROVIDER NOTES
EMERGENCY DEPARTMENT ENCOUNTER      NAME: Suzy Rodríguez  AGE: 35 year old female  YOB: 1985  MRN: [id number]  EVALUATION DATE & TIME: 8/8/2021 12:15 AM    PCP: Ivette Leal    ED PROVIDER: Faye Patrick M.D.      Chief Complaint   Patient presents with     Shortness of Breath     Chest Pain     Dizziness         FINAL IMPRESSION:  1. Chest pain, unspecified type        MEDICAL DECISION MAKING:    Pertinent Labs & Imaging studies reviewed. (See chart for details)  ED Course as of Aug 08 0150   Sun Aug 08, 2021   0035 Afebrile.  Vital signs here with hypertension at patient's baseline.  Otherwise unremarkable.  Patient is coming in for evaluation of intermittent chest pain, left shoulder pain and left arm pain.  Intermittent, shoulder pains been going on for a few weeks intermittently on and off.  She was having some back pain, went to the chiropractor yesterday was asking if she had had any chest pain this week and she initially said no but when she got home she realized she was having occasional chest pain.  She reports is a sharp, on right under her left breast.  No shortness of breath associated with this.  Seems associated with activity, gets better with rest.  No previous episodes of this in the past.  No trauma or recent twisting injury.  This intermittent chest pain has been ongoing since Wednesday.  Of note patient with history of adrenal carcinoma currently in remission.  Last stopped chemotherapy orally this past May.  She is not describing any pleuritic chest pain.  Initially had complained of dizziness to triage but denies that here.Physical exam for patient here is completely unremarkable.Patient's EKG here shows sinus rhythm at a rate of 66.  Normal axis.  No ST elevations or depressions.  There is T wave inversion seen in V2 which was not seen on previous EKG from December 9, 2020.  QTC measures 452.  MA interval slightly short at 110, had been 112 previously.  With  exception of those no significant change from previous.Given patient's history of carcinoma did check a D-dimer on patient which came back here within normal limits.  CBC without acute findings.  Awaiting basic, troponin and BNP as well as chest x-ray.      0038 Of note patient states that she gets facial flushing and hives with aspirin so this was not given in the emergency department.      0038 Patient's chest x-ray here unremarkable.  BNP unremarkable.  Awaiting troponin.        Troponin here unremarkable.  Do not feel she requires delta as this pain has been ongoing since this past Wednesday.  Unsure etiology at this time for chest pain but does not seem consistent with pneumothorax, PE, ACS.  Again atypical in terms of cardiac cause given comes and goes.  Will have follow-up with primary care this coming Monday.  Return for any new or worsening symptoms.      Critical care: 0 minutes excluding separately billable procedures.  Includes bedside management, time reviewing test results, review of records, discussing the case with staff, documenting the medical record and time spent with family members (or surrogate decision makers) discussing specific treatment issues.          ED COURSE:  12:26 AM I met with the patient to gather history and to perform my initial exam. I discussed the plan for care while in the Emergency Department. PPE gloves,surgical cap, N95 mask, surgical mask) was worn during patient encounters.   1:48 AM I updated patient and discussed plan for discharge, which patient is agreeable with.     The importance of close follow up was discussed. We reviewed warning signs and symptoms, and I instructed Ms. Rodríguez to return to the emergency department immediately if she develops any new or worsening symptoms. I provided additional verbal discharge instructions. Ms. Rodríguez expressed understanding and agreement with this plan of care, her questions were answered, and she was discharged in stable  condition.     MEDICATIONS GIVEN IN THE EMERGENCY:  Medications - No data to display    NEW PRESCRIPTIONS STARTED AT TODAY'S ER VISIT:  New Prescriptions    No medications on file          =================================================================    HPI    Patient information was obtained from: Patient    Use of : N/A        Suzy Rodríguez is a 35 year old female who has pertinent medical history of adrenal cortex cancer, MTHFR mutation, clotting disorder, depression, pneumonia, pulmonary embolism, GERD, kidney stones,, and HTN presents for evaluation of shortness of breath, chest pain, and dizziness.    Patient reports for the past week she has been experiencing intermittent chest pain under left breast with radiation into left arm. She also intermittent shortness of breath with these episodes. Patient states these episodes last for about 20 minutes at a time. Pain is exacerbated with movement and deep breathes. She denies any alleviating factors. Sometimes with these episodes patient experiences diaphoresis, lightheadedness, and dizziness. She denies any trauma or weight lifting that would have caused the pain.     Patient mentions she did see a chiropractor on 8/4 (~3 days ago) where she had her back and neck adjusted. He was asking patient questions, which prompted patient to realize she was experiencing these symptoms and present for evaluation.     Denies fever, chills, cough, or additional medical concerns or complaints at this time.     Of note, patient was on oral chemo for adrenal cortex cancer and states her last dose was on May 31st, so she is chronically nauseous. She restarted her job after three years, so she is unsure if increased stress is contributing to her symptoms.       REVIEW OF SYSTEMS   Review of Systems   Constitutional: Positive for diaphoresis (intermittent). Negative for chills and fever.   Respiratory: Positive for shortness of breath (intermittent). Negative for  "cough.    Cardiovascular: Positive for chest pain (intermittent; under left breast with radiation into left arm).   Gastrointestinal: Positive for nausea (chronic).   Neurological: Positive for dizziness (intermittent) and headaches (intermittent).   All other systems reviewed and are negative.        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 Mansfield.     Adrenal mass (H)      Anemia     thought due to heavy menses.     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  Check in May. SCP started.  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     SHREYA on CPAP 12/17/2015    Stopped using CPAP the Fall of 2016 after weight loss as even at lower pressure/adjustment couldn't tolerate the cpap. Resting well, cautioned to watch for any signs of fatigue and consider \"titration study\" in the future to see if cpap is still required at her new weight.     Pneumonia     hx of recurrent pneumonia issues/respiratory infections.     Polycystic ovary syndrome     able to get pregnant, not on meds.     Pulmonary embolism (H) 2009 or so    briefly on wafarin. "     Sleep apnea     cpap     Vitamin D deficiency        PAST SURGICAL HISTORY:  Past Surgical History:   Procedure Laterality Date     ADRENALECTOMY Right 2018    Procedure: ADRENALECTOMY;  Right Adrenalectomy,  Embolize Liver , Anesthesia Block;  Surgeon: Warren Mansfield MD;  Location: [facility] OR     ADRENALECTOMY       ADRENALECTOMY Right       SECTION      twice      SECTION      2      SECTION      x2     EMBOLECTOMY ABDOMEN N/A 2018    Procedure: EMBOLECTOMY ABDOMEN;;  Surgeon: Ector Mcintyre MD;  Location: [facility] OR     EXTRACORPOREAL SHOCK WAVE LITHOTRIPSY (ESWL)       GASTRIC BYPASS       LA CYSTO/URETERO W/LITHOTRIPSY &INDWELL STENT INSRT Left 2018    Procedure: CYSTOSCOPY, LEFT URETEROSCOPY LASER LITHOTRIPSY STENT INSERTION;  Surgeon: Skyler Delvalle MD;  Location: [facility];  Service: Urology     LA LAP GASTRIC BYPASS/DERICK-EN-Y N/A 2016    Procedure: GASTRIC BYPASS LAPAROSCOPIC DERICK-EN-Y;  Surgeon: Randy Sutherland MD;  Location: [facility];  Service: General     TUBAL LIGATION       WISDOM TOOTH EXTRACTION Bilateral        CURRENT MEDICATIONS:    No current facility-administered medications for this encounter.    Current Outpatient Medications:      acetaminophen (TYLENOL) 325 MG tablet, Take 2 tablets (650 mg) by mouth every 4 hours as needed for other (multimodal surgical pain management along with NSAIDS and opioid medication as indicated based on pain control and physical function.), Disp: 150 tablet, Rfl: 0     buPROPion (WELLBUTRIN SR) 150 MG 12 hr tablet, Take 150 mg by mouth, Disp: , Rfl:      calcitRIOL (ROCALTROL) 0.5 MCG capsule, , Disp: , Rfl:      calcium carbonate 500 mg, elemental, (OSCAL) 500 MG tablet, Take 1 tablet (500 mg) by mouth 2 times daily, Disp: 180 tablet, Rfl: 3     clonazePAM (KLONOPIN) 0.5 MG tablet, Take 1 tablet (0.5 mg) by mouth 2 times daily as needed for anxiety, Disp: 60 tablet, Rfl: 0      diazepam (VALIUM) 5 MG tablet, Take 1 tablet (5 mg) by mouth once as needed for anxiety (MRI claustrophobia management), Disp: 2 tablet, Rfl: 0     fludrocortisone (FLORINEF) 0.1 MG tablet, Take 2 tablets (0.2 mg) by mouth daily, Disp: 180 tablet, Rfl: 3     FLUoxetine (PROZAC) 20 MG capsule, Take 3 capsules (60 mg) by mouth daily, Disp: 270 capsule, Rfl: 1     gabapentin (NEURONTIN) 300 MG capsule, Take 3 capsules (900 mg) by mouth 3 times daily, Disp: 810 capsule, Rfl: 1     hydrocortisone (CORTEF) 10 MG tablet, Take 3 tablets (30 MG) every morning and take 2 tablets (20 MG) by mouth every day at 2 PM. Additional as directed. (Patient taking differently: Take 2 tablets (20 MG) every morning and take 1 tablets (10 MG) by mouth every day at 2 PM. Additional as directed.), Disp: 550 tablet, Rfl: 3     levothyroxine (SYNTHROID/LEVOTHROID) 125 MCG tablet, Take 1 tablet (125 mcg) by mouth daily (Patient taking differently: Take 125 mcg by mouth daily On Sunday's - 250 mg), Disp: 90 tablet, Rfl: 3     ondansetron (ZOFRAN) 8 MG tablet, Take 1 tablet (8 mg) by mouth every 8 hours as needed for nausea, Disp: 30 tablet, Rfl: 0     prednisoLONE acetate (PRED FORTE) 1 % ophthalmic suspension, 1-2 drops 4 times daily, Disp: , Rfl:      prochlorperazine (COMPAZINE) 5 MG tablet, Take 1 tablet (5 mg) by mouth every 8 hours as needed for nausea or vomiting, Disp: 90 tablet, Rfl: 3     temazepam (RESTORIL) 15 MG capsule, Take 1 capsule (15 mg) by mouth nightly as needed for sleep, Disp: 30 capsule, Rfl: 3     UNABLE TO FIND, medical cannabis (Patient's own supply. Not a prescription), Disp: , Rfl:     ALLERGIES:  Allergies   Allergen Reactions     Bee Venom Swelling     Ibuprofen Hives and Swelling       FAMILY HISTORY:  Family History   Problem Relation Age of Onset     Depression Paternal Grandmother      Diabetes Mother      Diabetes Father      Hypertension Father      Chronic Obstructive Pulmonary Disease Father      Cancer  Maternal Grandmother         gastric     Urolithiasis Maternal Grandmother      Heart Disease Maternal Grandfather      Cancer Maternal Grandfather         lung     Heart Disease Paternal Grandfather      Breast Cancer No family hx of      Colon Cancer No family hx of      Prostate Cancer No family hx of      Cerebrovascular Disease No family hx of      Clotting Disorder No family hx of      Gout No family hx of        SOCIAL HISTORY:   Social History     Socioeconomic History     Marital status:      Spouse name: Not on file     Number of children: Not on file     Years of education: Not on file     Highest education level: Not on file   Occupational History     Not on file   Tobacco Use     Smoking status: Never Smoker     Smokeless tobacco: Never Used   Substance and Sexual Activity     Alcohol use: Yes     Comment: occ.     Drug use: No     Sexual activity: Never   Other Topics Concern     Parent/sibling w/ CABG, MI or angioplasty before 65F 55M? Not Asked   Social History Narrative     Not on file     Social Determinants of Health     Financial Resource Strain:      Difficulty of Paying Living Expenses:    Food Insecurity:      Worried About Running Out of Food in the Last Year:      Ran Out of Food in the Last Year:    Transportation Needs:      Lack of Transportation (Medical):      Lack of Transportation (Non-Medical):    Physical Activity:      Days of Exercise per Week:      Minutes of Exercise per Session:    Stress:      Feeling of Stress :    Social Connections:      Frequency of Communication with Friends and Family:      Frequency of Social Gatherings with Friends and Family:      Attends [employer] Services:      Active Member of Clubs or Organizations:      Attends Club or Organization Meetings:      Marital Status:    Intimate Partner Violence:      Fear of Current or Ex-Partner:      Emotionally Abused:      Physically Abused:      Sexually Abused:        PHYSICAL EXAM:    Vitals: BP (!)  "148/82   Pulse 68   Temp 97  F (36.1  C)   Resp 16   Ht 1.626 m (5' 4\")   Wt 71.2 kg (157 lb)   LMP 08/07/2020   SpO2 98%   BMI 26.95 kg/m     General:. Alert and interactive, comfortable appearing.  HENT: Oropharynx without erythema or exudates. MMM.  TMs clear bilaterally.  Eyes: Pupils mid-sized and equally reactive.   Neck: Full AROM.  No midline tenderness to palpation.  Cardiovascular: Regular rate and rhythm. Peripheral pulses 2+ bilaterally.  Chest/Pulmonary: Normal work of breathing. Lung sounds clear and equal throughout, no wheezes or crackles. No chest wall tenderness or deformities.  Abdomen: Soft, nondistended. Nontender without guarding or rebound.  Back/Spine: No CVA or midline tenderness.  Extremities: Normal ROM of all major joints. No lower extremity edema.   Skin: Warm and dry. Normal skin color.   Neuro: Speech clear. CNs grossly intact. Moves all extremities appropriately. Strength and sensation grossly intact to all extremities.   Psych: Normal affect/mood, cooperative, memory appropriate.     LAB:  All pertinent labs reviewed and interpreted.  Labs Ordered and Resulted from Time of ED Arrival Up to the Time of Departure from the ED   CBC WITH PLATELETS - Abnormal; Notable for the following components:       Result Value    Platelet Count 96 (*)     All other components within normal limits   BASIC METABOLIC PANEL - Abnormal; Notable for the following components:    Chloride 108 (*)     All other components within normal limits   TROPONIN I - Normal   B-TYPE NATRIURETIC PEPTIDE ([facility] ONLY) - Normal   D DIMER QUANTITATIVE - Normal    Narrative:     This D-dimer assay is intended for use in conjunction with a clinical pretest probability assessment model to exclude pulmonary embolism (PE) and deep venous thrombosis (DVT) in outpatients suspected of PE or DVT. The cut-off value is 0.50 ug/mL FEU.       RADIOLOGY:  Chest XR,  PA & LAT   Final Result   IMPRESSION: Negative chest.    "       EKG:  See MDM  Performed at: 22:16  Impression: Sinus rhythm with short AL, V2 decreased T wave  Rate: 66  Rhythm: Sinus  QRS Interval: 72  QTc Interval: 452  Comparison: December 9, 2020  I have independently reviewed and interpreted the EKG(s) documented above.       PROCEDURES:   None      I, Joyce Rodríguez, am serving as a scribe to document services personally performed by Dr. Faye Patrick  based on my observation and the provider's statements to me. I, Faye Patrick MD attest that Joyce Rodríguez is acting in a scribe capacity, has observed my performance of the services and has documented them in accordance with my direction.      Faye Patrick M.D.  Emergency Medicine  [facility] EMERGENCY DEPARTMENT  [facility] [facility] [address]  [phone]  Dept: [phone]       Maria Del Carmen Patrick MD  08/08/21 0150

## 2021-08-08 NOTE — DISCHARGE INSTRUCTIONS
Please return for any new or worsening symptoms.  If symptoms persist please follow-up with your primary care doctor early next week.

## 2021-08-09 ENCOUNTER — PATIENT OUTREACH (OUTPATIENT)
Dept: ONCOLOGY | Facility: CLINIC | Age: 36
End: 2021-08-09

## 2021-08-09 DIAGNOSIS — E27.40 UNSPECIFIED ADRENOCORTICAL INSUFFICIENCY (H): ICD-10-CM

## 2021-08-09 DIAGNOSIS — C74.00 ADRENAL CORTICAL ADENOCARCINOMA, UNSPECIFIED LATERALITY (H): Primary | ICD-10-CM

## 2021-08-10 ENCOUNTER — LAB (OUTPATIENT)
Dept: LAB | Facility: CLINIC | Age: 36
End: 2021-08-10
Payer: COMMERCIAL

## 2021-08-10 DIAGNOSIS — E27.40 UNSPECIFIED ADRENOCORTICAL INSUFFICIENCY (H): ICD-10-CM

## 2021-08-10 DIAGNOSIS — C74.00 ADRENAL CORTICAL ADENOCARCINOMA, UNSPECIFIED LATERALITY (H): ICD-10-CM

## 2021-08-10 LAB
ALBUMIN SERPL-MCNC: 3.3 G/DL (ref 3.4–5)
ALP SERPL-CCNC: 90 U/L (ref 40–150)
ALT SERPL W P-5'-P-CCNC: 15 U/L (ref 0–50)
ANION GAP SERPL CALCULATED.3IONS-SCNC: 7 MMOL/L (ref 3–14)
AST SERPL W P-5'-P-CCNC: 16 U/L (ref 0–45)
BASOPHILS # BLD AUTO: 0 10E3/UL (ref 0–0.2)
BASOPHILS NFR BLD AUTO: 0 %
BILIRUB SERPL-MCNC: 0.3 MG/DL (ref 0.2–1.3)
BUN SERPL-MCNC: 10 MG/DL (ref 7–30)
CALCIUM SERPL-MCNC: 8.3 MG/DL (ref 8.5–10.1)
CHLORIDE BLD-SCNC: 103 MMOL/L (ref 94–109)
CO2 SERPL-SCNC: 28 MMOL/L (ref 20–32)
CREAT SERPL-MCNC: 0.79 MG/DL (ref 0.52–1.04)
EOSINOPHIL # BLD AUTO: 0 10E3/UL (ref 0–0.7)
EOSINOPHIL NFR BLD AUTO: 1 %
ERYTHROCYTE [DISTWIDTH] IN BLOOD BY AUTOMATED COUNT: 13.5 % (ref 10–15)
GFR SERPL CREATININE-BSD FRML MDRD: >90 ML/MIN/1.73M2
GLUCOSE BLD-MCNC: 73 MG/DL (ref 70–99)
HCT VFR BLD AUTO: 45.8 % (ref 35–47)
HGB BLD-MCNC: 15.1 G/DL (ref 11.7–15.7)
LYMPHOCYTES # BLD AUTO: 1.2 10E3/UL (ref 0.8–5.3)
LYMPHOCYTES NFR BLD AUTO: 24 %
MCH RBC QN AUTO: 31.9 PG (ref 26.5–33)
MCHC RBC AUTO-ENTMCNC: 33 G/DL (ref 31.5–36.5)
MCV RBC AUTO: 97 FL (ref 78–100)
MONOCYTES # BLD AUTO: 0.3 10E3/UL (ref 0–1.3)
MONOCYTES NFR BLD AUTO: 6 %
NEUTROPHILS # BLD AUTO: 3.3 10E3/UL (ref 1.6–8.3)
NEUTROPHILS NFR BLD AUTO: 69 %
PERFORMING LABORATORY: NORMAL
PLATELET # BLD AUTO: 110 10E3/UL (ref 150–450)
POTASSIUM BLD-SCNC: 3.4 MMOL/L (ref 3.4–5.3)
PROT SERPL-MCNC: 6.4 G/DL (ref 6.8–8.8)
RBC # BLD AUTO: 4.74 10E6/UL (ref 3.8–5.2)
SODIUM SERPL-SCNC: 138 MMOL/L (ref 133–144)
SPECIMEN STATUS: NORMAL
T4 FREE SERPL-MCNC: 1.05 NG/DL (ref 0.76–1.46)
TEST NAME: NORMAL
TSH SERPL DL<=0.005 MIU/L-ACNC: 0.44 MU/L (ref 0.4–4)
WBC # BLD AUTO: 4.8 10E3/UL (ref 4–11)

## 2021-08-10 PROCEDURE — 36415 COLL VENOUS BLD VENIPUNCTURE: CPT

## 2021-08-10 PROCEDURE — 80050 GENERAL HEALTH PANEL: CPT

## 2021-08-10 PROCEDURE — 82542 COL CHROMOTOGRAPHY QUAL/QUAN: CPT | Mod: 90

## 2021-08-10 PROCEDURE — 82530 CORTISOL FREE: CPT | Mod: 90

## 2021-08-10 PROCEDURE — 82024 ASSAY OF ACTH: CPT

## 2021-08-10 PROCEDURE — 99000 SPECIMEN HANDLING OFFICE-LAB: CPT

## 2021-08-10 PROCEDURE — 82627 DEHYDROEPIANDROSTERONE: CPT

## 2021-08-10 PROCEDURE — 84439 ASSAY OF FREE THYROXINE: CPT

## 2021-08-11 LAB
ACTH PLAS-MCNC: 17 PG/ML
DHEA-S SERPL-MCNC: <15 UG/DL (ref 35–430)

## 2021-08-16 LAB
COLLECT DURATION TIME UR: 24 H
CORTIS 24H UR-MRATE: 78 MCG/24 H (ref 3.5–45)
CORTISONE 24H UR-MRATE: 110 MCG/24 H (ref 17–129)
SPECIMEN VOL 24H UR: 1450 ML

## 2021-08-19 DIAGNOSIS — F51.01 PRIMARY INSOMNIA: ICD-10-CM

## 2021-08-19 DIAGNOSIS — C74.01 MALIGNANT NEOPLASM OF CORTEX OF RIGHT ADRENAL GLAND (H): ICD-10-CM

## 2021-08-19 RX ORDER — TEMAZEPAM 15 MG/1
15 CAPSULE ORAL
Qty: 30 CAPSULE | Refills: 0 | Status: CANCELLED | OUTPATIENT
Start: 2021-08-19

## 2021-08-20 DIAGNOSIS — C74.01 MALIGNANT NEOPLASM OF CORTEX OF RIGHT ADRENAL GLAND (H): ICD-10-CM

## 2021-08-20 DIAGNOSIS — F51.01 PRIMARY INSOMNIA: ICD-10-CM

## 2021-08-20 RX ORDER — TEMAZEPAM 15 MG/1
15 CAPSULE ORAL
Qty: 30 CAPSULE | Refills: 0 | Status: SHIPPED | OUTPATIENT
Start: 2021-08-20 | End: 2021-09-03

## 2021-08-20 NOTE — TELEPHONE ENCOUNTER
Suzy calling about 30day supple of Temazepam to bridge until appt with Dr. Leal on 9/3/2021 per appts.     Missed yesterday's dose since ran out so worried about having it over weekend and not missing another dose.     3:00pm paged Dr. Shaw    4:15pm paged Dr. Maricarmen Figueroa DARLYN was consulted and able to sign for 30day supply.

## 2021-08-25 ENCOUNTER — LAB (OUTPATIENT)
Dept: LAB | Facility: CLINIC | Age: 36
End: 2021-08-25
Payer: COMMERCIAL

## 2021-08-25 ENCOUNTER — ANCILLARY PROCEDURE (OUTPATIENT)
Dept: CT IMAGING | Facility: CLINIC | Age: 36
End: 2021-08-25
Attending: INTERNAL MEDICINE
Payer: COMMERCIAL

## 2021-08-25 DIAGNOSIS — E27.40 ADRENAL INSUFFICIENCY (H): ICD-10-CM

## 2021-08-25 DIAGNOSIS — C74.90: ICD-10-CM

## 2021-08-25 DIAGNOSIS — C74.01 ADRENAL CORTEX CANCER, RIGHT (H): ICD-10-CM

## 2021-08-25 DIAGNOSIS — C74.01 MALIGNANT NEOPLASM OF CORTEX OF RIGHT ADRENAL GLAND (H): ICD-10-CM

## 2021-08-25 PROCEDURE — 71260 CT THORAX DX C+: CPT | Performed by: RADIOLOGY

## 2021-08-25 PROCEDURE — 74177 CT ABD & PELVIS W/CONTRAST: CPT | Performed by: RADIOLOGY

## 2021-08-25 RX ORDER — IOPAMIDOL 755 MG/ML
96 INJECTION, SOLUTION INTRAVASCULAR ONCE
Status: COMPLETED | OUTPATIENT
Start: 2021-08-25 | End: 2021-08-25

## 2021-08-25 RX ADMIN — IOPAMIDOL 96 ML: 755 INJECTION, SOLUTION INTRAVASCULAR at 12:04

## 2021-08-27 ENCOUNTER — PATIENT OUTREACH (OUTPATIENT)
Dept: ONCOLOGY | Facility: CLINIC | Age: 36
End: 2021-08-27

## 2021-08-28 NOTE — PROGRESS NOTES
TC to pt returning her VM stating that she needs an Rx for ondansetron transferred to where she is vacationing right now as she forgot it at home. Pt stated that she had found an Rx and no longer needed assistance in getting a new Rx. Pt also stated that the last time she went in for a lab draw that there weren't orders in and requested writer follow-up on this. Told pt that writer will discuss with Dr. Shaw and ask for standing orders to be entered for exactly what is needed. Pt stated understanding of all and agreed to call clinic with any questions or concerns.

## 2021-09-01 PROBLEM — E03.9 HYPOTHYROIDISM: Status: ACTIVE | Noted: 2018-12-01

## 2021-09-01 PROBLEM — E83.51 HYPOCALCEMIA: Status: ACTIVE | Noted: 2018-12-01

## 2021-09-01 PROBLEM — D17.9 LIPOMA: Status: ACTIVE | Noted: 2021-09-01

## 2021-09-01 PROBLEM — Z63.0 MARITAL RELATIONSHIP PROBLEM: Status: ACTIVE | Noted: 2020-05-05

## 2021-09-01 PROBLEM — Z30.430 ENCOUNTER FOR INSERTION OF INTRAUTERINE CONTRACEPTIVE DEVICE (IUD): Status: ACTIVE | Noted: 2019-01-31

## 2021-09-01 LAB — LABCORP INTERFACED MISCELLANEOUS TEST RESULT: NORMAL

## 2021-09-03 ENCOUNTER — OFFICE VISIT (OUTPATIENT)
Dept: FAMILY MEDICINE | Facility: CLINIC | Age: 36
End: 2021-09-03
Payer: COMMERCIAL

## 2021-09-03 VITALS
BODY MASS INDEX: 27.18 KG/M2 | DIASTOLIC BLOOD PRESSURE: 87 MMHG | TEMPERATURE: 98.7 F | OXYGEN SATURATION: 97 % | HEIGHT: 64 IN | SYSTOLIC BLOOD PRESSURE: 130 MMHG | WEIGHT: 159.2 LBS | RESPIRATION RATE: 16 BRPM | HEART RATE: 80 BPM

## 2021-09-03 DIAGNOSIS — R10.11 RUQ ABDOMINAL PAIN: Primary | ICD-10-CM

## 2021-09-03 DIAGNOSIS — C74.01 MALIGNANT NEOPLASM OF CORTEX OF RIGHT ADRENAL GLAND (H): ICD-10-CM

## 2021-09-03 DIAGNOSIS — F33.1 MODERATE EPISODE OF RECURRENT MAJOR DEPRESSIVE DISORDER (H): ICD-10-CM

## 2021-09-03 DIAGNOSIS — F41.9 ANXIETY DISORDER, UNSPECIFIED TYPE: ICD-10-CM

## 2021-09-03 DIAGNOSIS — F51.01 PRIMARY INSOMNIA: ICD-10-CM

## 2021-09-03 LAB
ALBUMIN SERPL-MCNC: 3 G/DL (ref 3.4–5)
ALP SERPL-CCNC: 97 U/L (ref 40–150)
ALT SERPL W P-5'-P-CCNC: 17 U/L (ref 0–50)
ANION GAP SERPL CALCULATED.3IONS-SCNC: 5 MMOL/L (ref 3–14)
AST SERPL W P-5'-P-CCNC: 18 U/L (ref 0–45)
BILIRUB SERPL-MCNC: 0.2 MG/DL (ref 0.2–1.3)
BUN SERPL-MCNC: 9 MG/DL (ref 7–30)
CALCIUM SERPL-MCNC: 7.9 MG/DL (ref 8.5–10.1)
CHLORIDE BLD-SCNC: 107 MMOL/L (ref 94–109)
CO2 SERPL-SCNC: 28 MMOL/L (ref 20–32)
CREAT SERPL-MCNC: 0.79 MG/DL (ref 0.52–1.04)
ERYTHROCYTE [DISTWIDTH] IN BLOOD BY AUTOMATED COUNT: 13.2 % (ref 10–15)
GFR SERPL CREATININE-BSD FRML MDRD: >90 ML/MIN/1.73M2
GLUCOSE BLD-MCNC: 92 MG/DL (ref 70–99)
HCT VFR BLD AUTO: 42.5 % (ref 35–47)
HGB BLD-MCNC: 14.1 G/DL (ref 11.7–15.7)
MCH RBC QN AUTO: 31.5 PG (ref 26.5–33)
MCHC RBC AUTO-ENTMCNC: 33.2 G/DL (ref 31.5–36.5)
MCV RBC AUTO: 95 FL (ref 78–100)
PLATELET # BLD AUTO: 148 10E3/UL (ref 150–450)
POTASSIUM BLD-SCNC: 3.9 MMOL/L (ref 3.4–5.3)
PROT SERPL-MCNC: 5.9 G/DL (ref 6.8–8.8)
RBC # BLD AUTO: 4.48 10E6/UL (ref 3.8–5.2)
SODIUM SERPL-SCNC: 140 MMOL/L (ref 133–144)
T4 FREE SERPL-MCNC: 1.03 NG/DL (ref 0.76–1.46)
TSH SERPL DL<=0.005 MIU/L-ACNC: 0.28 MU/L (ref 0.4–4)
WBC # BLD AUTO: 5.9 10E3/UL (ref 4–11)

## 2021-09-03 PROCEDURE — 80053 COMPREHEN METABOLIC PANEL: CPT | Performed by: FAMILY MEDICINE

## 2021-09-03 PROCEDURE — 84443 ASSAY THYROID STIM HORMONE: CPT | Performed by: FAMILY MEDICINE

## 2021-09-03 PROCEDURE — 96127 BRIEF EMOTIONAL/BEHAV ASSMT: CPT | Performed by: FAMILY MEDICINE

## 2021-09-03 PROCEDURE — 85027 COMPLETE CBC AUTOMATED: CPT | Performed by: FAMILY MEDICINE

## 2021-09-03 PROCEDURE — 84439 ASSAY OF FREE THYROXINE: CPT | Performed by: FAMILY MEDICINE

## 2021-09-03 PROCEDURE — 82627 DEHYDROEPIANDROSTERONE: CPT | Performed by: FAMILY MEDICINE

## 2021-09-03 PROCEDURE — 99215 OFFICE O/P EST HI 40 MIN: CPT | Performed by: FAMILY MEDICINE

## 2021-09-03 PROCEDURE — 82024 ASSAY OF ACTH: CPT | Performed by: FAMILY MEDICINE

## 2021-09-03 PROCEDURE — 36415 COLL VENOUS BLD VENIPUNCTURE: CPT | Performed by: FAMILY MEDICINE

## 2021-09-03 RX ORDER — TEMAZEPAM 15 MG/1
15 CAPSULE ORAL
Qty: 30 CAPSULE | Refills: 0 | Status: SHIPPED | OUTPATIENT
Start: 2021-09-03 | End: 2021-10-08

## 2021-09-03 ASSESSMENT — ANXIETY QUESTIONNAIRES
IF YOU CHECKED OFF ANY PROBLEMS ON THIS QUESTIONNAIRE, HOW DIFFICULT HAVE THESE PROBLEMS MADE IT FOR YOU TO DO YOUR WORK, TAKE CARE OF THINGS AT HOME, OR GET ALONG WITH OTHER PEOPLE: SOMEWHAT DIFFICULT
2. NOT BEING ABLE TO STOP OR CONTROL WORRYING: SEVERAL DAYS
6. BECOMING EASILY ANNOYED OR IRRITABLE: NEARLY EVERY DAY
5. BEING SO RESTLESS THAT IT IS HARD TO SIT STILL: NEARLY EVERY DAY
GAD7 TOTAL SCORE: 16
1. FEELING NERVOUS, ANXIOUS, OR ON EDGE: NEARLY EVERY DAY
3. WORRYING TOO MUCH ABOUT DIFFERENT THINGS: MORE THAN HALF THE DAYS
7. FEELING AFRAID AS IF SOMETHING AWFUL MIGHT HAPPEN: SEVERAL DAYS

## 2021-09-03 ASSESSMENT — PATIENT HEALTH QUESTIONNAIRE - PHQ9
5. POOR APPETITE OR OVEREATING: NEARLY EVERY DAY
SUM OF ALL RESPONSES TO PHQ QUESTIONS 1-9: 20

## 2021-09-03 ASSESSMENT — MIFFLIN-ST. JEOR: SCORE: 1401.13

## 2021-09-03 NOTE — PATIENT INSTRUCTIONS
I have ordered outside imaging for you.  You can call [phone] to get this scheduled.  This is the main scheduling number and they will be able to help you schedule anywhere within the [facility] system.      MENTAL HEALTH REFERRAL  - Adult; Assessments and Testing; ADHD; G: [facility] [phone]; We will contact you to schedule the appointment or please call with any questions

## 2021-09-03 NOTE — PROGRESS NOTES
Assessment/Plan:    Suzy Rodríguez is a 35 year old female presenting for:    RUQ abdominal pain  That history does not seem overly convincing for gallstones however I think it is reasonable to get an ultrasound given the low expense and no radiation. This will be ordered along with some lab test.  - Comprehensive metabolic panel (BMP + Alb, Alk Phos, ALT, AST, Total. Bili, TP)  - US Abdomen Limited  - Comprehensive metabolic panel (BMP + Alb, Alk Phos, ALT, AST, Total. Bili, TP)    Malignant neoplasm of cortex of right adrenal gland (H)  Labs below will be ordered  - DHEA sulfate  - temazepam (RESTORIL) 15 MG capsule  Dispense: 30 capsule; Refill: 0  - Adrenal corticotropin  - TSH  - T4, free  - Comprehensive metabolic panel (BMP + Alb, Alk Phos, ALT, AST, Total. Bili, TP)  - CBC with platelets  - DHEA sulfate  - Adrenal corticotropin  - TSH  - T4, free  - Comprehensive metabolic panel (BMP + Alb, Alk Phos, ALT, AST, Total. Bili, TP)  - CBC with platelets    Primary insomnia  Refill  - temazepam (RESTORIL) 15 MG capsule  Dispense: 30 capsule; Refill: 0    Moderate episode of recurrent major depressive disorder (H)  Continue Prozac and Wellbutrin.  - DEPRESSION ACTION PLAN (DAP)    Anxiety disorder, unspecified type   - TSH  - T4, free  - TSH  - T4, free      I personally spent over 40 minutes performing care related to this patient on the day of the patient visit.  This includes chart review, precharting, talking with/ examining the patient as well as completing after visit documentation.      Medications Discontinued During This Encounter   Medication Reason     calcium carbonate 500 mg, elemental, (OSCAL) 500 MG tablet      prednisoLONE acetate (PRED FORTE) 1 % ophthalmic suspension      UNABLE TO FIND      temazepam (RESTORIL) 15 MG capsule Reorder           Chief Complaint:  Abdominal Pain and Blood Draw        Subjective:   Suzy Rodríguez is a pleasant 35-year-old female presenting to the clinic  today for several concerns:    1. Back and abdominal pain: Patient states that she is been having pain under her bilateral shoulder blades for quite some time. She has been seeing her chiropractor who has been doing some adjustments. She states that they have not been overly beneficial for her. She states that this will randomly flare. She is unsure if it is related to eating and she has not been paying attention to that. When it flares it can last a few hours to a day or two. It can be very painful at that time. She states the chiropractor helps with a lot of other pains in her back but has not been able to help with this. She states the pain is also in her right upper quadrant. She states that the chiropractor recommended that she have her gallbladder examined. Sometimes she does feel nauseated during the flares.      2. Adrenal gland malignancy: She will be seeing her [place] oncologist and is hopeful to get some labs drawn today. She uses medical marijuana for her cancer pain.    3. Depression and anxiety: Patient states that her anxiety has been increasing recently. She is currently on Prozac and Wellbutrin. She also uses medical marijuana both for cancer pain as well as for anxiety.    4. ADHD: Patient has concerns that she has ADHD. I had given her a referral back this summer for an ADHD evaluation however they were booked out until September. She did not make appointment as she was frustrated they were booked out so far. Is not September and she wishes to see them. She is hopeful to get in soon. She states that she has difficulties at her job paying attention. She is unsure if maybe this is related to anxiety.      12 point review of systems completed and negative except for what has been described above    History   Smoking Status     Never Smoker   Smokeless Tobacco     Never Used         Current Outpatient Medications:      buPROPion (WELLBUTRIN SR) 150 MG 12 hr tablet, Take 150 mg by mouth, Disp: ,  Rfl:      calcitRIOL (ROCALTROL) 0.5 MCG capsule, , Disp: , Rfl:      clonazePAM (KLONOPIN) 0.5 MG tablet, Take 1 tablet (0.5 mg) by mouth 2 times daily as needed for anxiety, Disp: 60 tablet, Rfl: 0     fludrocortisone (FLORINEF) 0.1 MG tablet, Take 2 tablets (0.2 mg) by mouth daily, Disp: 180 tablet, Rfl: 3     FLUoxetine (PROZAC) 20 MG capsule, Take 3 capsules (60 mg) by mouth daily, Disp: 270 capsule, Rfl: 1     gabapentin (NEURONTIN) 300 MG capsule, Take 3 capsules (900 mg) by mouth 3 times daily, Disp: 810 capsule, Rfl: 1     hydrocortisone (CORTEF) 10 MG tablet, Take 3 tablets (30 MG) every morning and take 2 tablets (20 MG) by mouth every day at 2 PM. Additional as directed. (Patient taking differently: Take 2 tablets (20 MG) every morning and take 1 tablets (10 MG) by mouth every day at 2 PM. Additional as directed.), Disp: 550 tablet, Rfl: 3     levothyroxine (SYNTHROID/LEVOTHROID) 125 MCG tablet, Take 1 tablet (125 mcg) by mouth daily (Patient taking differently: Take 125 mcg by mouth daily On Sunday's - 250 mg), Disp: 90 tablet, Rfl: 3     medical cannabis (Patient's own supply), See Admin Instructions (The purpose of this order is to document that the patient reports taking medical cannabis.  This is not a prescription, and is not used to certify that the patient has a qualifying medical condition.), Disp: , Rfl:      temazepam (RESTORIL) 15 MG capsule, Take 1 capsule (15 mg) by mouth nightly as needed for sleep, Disp: 30 capsule, Rfl: 0     acetaminophen (TYLENOL) 325 MG tablet, Take 2 tablets (650 mg) by mouth every 4 hours as needed for other (multimodal surgical pain management along with NSAIDS and opioid medication as indicated based on pain control and physical function.) (Patient not taking: Reported on 9/3/2021), Disp: 150 tablet, Rfl: 0     diazepam (VALIUM) 5 MG tablet, Take 1 tablet (5 mg) by mouth once as needed for anxiety (MRI claustrophobia management) (Patient not taking: Reported on  "9/3/2021), Disp: 2 tablet, Rfl: 0     ondansetron (ZOFRAN) 8 MG tablet, Take 1 tablet (8 mg) by mouth every 8 hours as needed for nausea (Patient not taking: Reported on 9/3/2021), Disp: 30 tablet, Rfl: 0     prochlorperazine (COMPAZINE) 5 MG tablet, Take 1 tablet (5 mg) by mouth every 8 hours as needed for nausea or vomiting (Patient not taking: Reported on 9/3/2021), Disp: 90 tablet, Rfl: 3      Objective:  Vitals:    09/03/21 0741   BP: 130/87   Pulse: 80   Resp: 16   Temp: 98.7  F (37.1  C)   TempSrc: Tympanic   SpO2: 97%   Weight: 72.2 kg (159 lb 3.2 oz)   Height: 1.632 m (5' 4.25\")       Body mass index is 27.11 kg/m .    Vital signs reviewed and stable  General: No acute distress  Psych: Appropriate affect  HEENT: moist mucous membranes, pupils equal, round, reactive to light and accomodation, tympanic membranes are pearly grey bilaterally  Lymph: no cervical or supraclavicular lymphadenopathy  Cardiovascular: regular rate and rhythm with no murmur  Pulmonary: clear to auscultation bilaterally with no wheeze  Abdomen: soft, non tender, mild right upper quadrant pain  Extremities: warm and well perfused with no edema  Skin: warm and dry with no rash         This note has been dictated and transcribed using voice recognition software.   Any errors in transcription are unintentional and inherent to the software.  "

## 2021-09-04 ASSESSMENT — ANXIETY QUESTIONNAIRES: GAD7 TOTAL SCORE: 16

## 2021-09-05 ENCOUNTER — HOSPITAL ENCOUNTER (OUTPATIENT)
Dept: ULTRASOUND IMAGING | Facility: CLINIC | Age: 36
Discharge: HOME | End: 2021-09-05
Attending: FAMILY MEDICINE | Admitting: FAMILY MEDICINE
Payer: COMMERCIAL

## 2021-09-05 DIAGNOSIS — R10.11 RUQ ABDOMINAL PAIN: ICD-10-CM

## 2021-09-05 PROCEDURE — 76705 ECHO EXAM OF ABDOMEN: CPT

## 2021-09-07 LAB — DHEA-S SERPL-MCNC: <15 UG/DL (ref 35–430)

## 2021-09-08 LAB — ACTH PLAS-MCNC: <10 PG/ML

## 2021-09-13 ENCOUNTER — VIRTUAL VISIT (OUTPATIENT)
Dept: ONCOLOGY | Facility: CLINIC | Age: 36
End: 2021-09-13
Attending: INTERNAL MEDICINE
Payer: COMMERCIAL

## 2021-09-13 DIAGNOSIS — F41.9 ANXIETY: ICD-10-CM

## 2021-09-13 DIAGNOSIS — C74.91 MALIGNANT NEOPLASM OF ADRENAL GLAND, RIGHT (H): Primary | ICD-10-CM

## 2021-09-13 PROCEDURE — 82530 CORTISOL FREE: CPT | Mod: 90 | Performed by: PATHOLOGY

## 2021-09-13 PROCEDURE — 99214 OFFICE O/P EST MOD 30 MIN: CPT | Mod: 95 | Performed by: INTERNAL MEDICINE

## 2021-09-13 PROCEDURE — 82542 COL CHROMOTOGRAPHY QUAL/QUAN: CPT | Mod: 90 | Performed by: PATHOLOGY

## 2021-09-13 RX ORDER — GABAPENTIN 300 MG/1
900 CAPSULE ORAL 3 TIMES DAILY
Qty: 810 CAPSULE | Refills: 1 | Status: SHIPPED | OUTPATIENT
Start: 2021-09-13 | End: 2021-10-18

## 2021-09-13 NOTE — NURSING NOTE
Pt notes taking 15 and 10 of the Hydrocortisone now; Pt notes taking Calcitrol along 2 Calcium vitamins as well in addition

## 2021-09-13 NOTE — PROGRESS NOTES
"Suzy is a 35 year old who is being evaluated via a billable video visit.      How would you like to obtain your AVS? MyChart  If the video visit is dropped, the invitation should be resent by: Text to cell phone: [phone]   Will anyone else be joining your video visit? Yes: Spouse. How would they like to receive their invitation? Other e-mail: NA      Video Start Time: 8:15 AM  Video-Visit Details    Type of service:  Video Visit    Video End Time:8:31 AM    Originating Location (pt. Location): Home    Distant Location (provider location):  [facility]     Platform used for Video Visit: Milton Almonte is a 35 year old who is being evaluated via a billable video visit.          [facility] Oncology Followup  8/2/21    REFERRING PROVIDER: Warren Mansfield MD from [facility] Urologic Oncology clinic.      REASON FOR CURRENT VISIT: Follow-up for adrenocortical carcinoma,    ONCOLOGIC HISTORY:  1. 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   - CT abdomen and pelvis stone protocol without contrast 5 5221 showed \"9.2 x 8 cm heterogeneous right upper quadrant mass with small foci of calcification within it which is likely arising from the adrenal gland though inseparable from liver and upper pole of right kidney. It is incompletely evaluated on this noncontrast study. 3 x 2.6 cm mass right lobe of liver on image #85 also incompletely evaluated. 6 x 4 x 4 mm upper third left ureteral obstructing stone with mild to moderate secondary hydronephrosis. Collection of stones at lower pole left kidney extending over an area of approximately 6 x 7 x 4 mm. Additional nonobstructing 1 mm stone upper pole left kidney. 2 mm nonobstructing stone noted upper pole right kidney with no hydronephrosis on the right. Postop " "change consistent with gastric bypass. Noncontrast images of spleen, left adrenal gland, gallbladder, and pancreas unremarkable. No aneurysm. No adenopathy.\"  - Seen by Dr. Delvalle at [facility] Urology clinic. Referred to Dr. Alvino Patel and then Dr. Warren Mansfield.  - Endocrinology team directed workup showed high DHEAS level of 740 pre-surgery.   - Right open adrenalectomy and hepatic mobilization performed by Dr. Warren Mansfield on 6/25/2018. Pathology showed adrenocortical carcinoma measuring 11.3 cm, weighing 413 g with extension into or through the adrenal capsule negative lymphovascular invasion, tumor necrosis present, margins involved by tumor, no regional lymph nodes identified, pathologic stage T2 NX.  pathology review reveals low grade ACC.  - DHEAS normalized postsurgery.   - Adjuvant Mitotane started on 7/24/18. Dose increased to 2000mg TID around 10/23/18 due to persistently low troughs. Held 1/16/19 for two weeks and resumed 1/30 at 1000 mg po BID due to very poor tolerance of higher doses. Highest mitotane level was 10.2 on 2/11/19. Mitotane troughs did not rise above 10 despite increase in dose to 1500 BID and then 1500+2000. Started 2000mg BID on 10/28/20. Increased to [id number] since around Christmas 2020.   - Saw radiation oncology at [facility], radiation oncology at [facility], as well as endocrine oncology (Dr. Huertas) at [facility].   - S/p adjuvant RT by Dr. Monsivais - 5040 cGy over 30 fr (8/24/18-10/6/18).  - 2/11/19: OSH CT C/A/P and MRI brain with contrast - no evidence of disease recurrence.   - 06/08/20, 09/14/20, 12/7/20, 3/16/2021: CT C/A/P with contrast - AFUA.    INTERVAL HISTORY:  Ms. Rodríguez is a 35-year-old woman with history notable for right-sided functioning adrenocortical carcinoma (diagnosed in May 2018), who was  on adjuvant mitotane. Her mitotane [phone] since around Christmas 2020.  She is now off the mitotane " "and doing well. She is on Adrenal replacement and stable.      -125/70-80  No symptoms of light headedness while at rest.   Appt w Dr Huertas on Sept 7 - has appts with them quarterly    BPS high in clinic but ok at home - trying to come off florinef. 123/77 yesterday after holding the florinef  Has tried to reduce the dose of gabapentin but gets neuropathy pain.      REVIEW OF SYSTEMS: 14 point ROS negative other than the symptoms noted above in the HPI.    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 Mansfield.     Adrenal mass (H)      Anemia     thought due to heavy menses.     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  Check in May. SCP started.  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     SHREYA on CPAP 12/17/2015    Stopped using CPAP the Fall of 2016 after weight loss as even at lower pressure/adjustment couldn't tolerate the cpap. Resting well, cautioned to watch for any signs of fatigue and consider \"titration study\" in the future to see if cpap is still required at her new weight.     Pneumonia     hx of recurrent " pneumonia issues/respiratory infections.     Polycystic ovary syndrome     able to get pregnant, not on meds.     Pulmonary embolism (H)  or so    briefly on wafarin.     Sleep apnea     cpap     Vitamin D deficiency    MHTFR mutation with h/o mutiple miscarriages.    PAST SURGICAL HISTORY:   Past Surgical History:   Procedure Laterality Date     ADRENALECTOMY Right 2018    Procedure: ADRENALECTOMY;  Right Adrenalectomy,  Embolize Liver , Anesthesia Block;  Surgeon: Warren Mansfield MD;  Location: [facility] OR     ADRENALECTOMY       ADRENALECTOMY Right       SECTION      twice      SECTION      2      SECTION      x2     EMBOLECTOMY ABDOMEN N/A 2018    Procedure: EMBOLECTOMY ABDOMEN;;  Surgeon: Ector Mcintyre MD;  Location: [facility] OR     EXTRACORPOREAL SHOCK WAVE LITHOTRIPSY (ESWL)       GASTRIC BYPASS       OK CYSTO/URETERO W/LITHOTRIPSY &INDWELL STENT INSRT Left 2018    Procedure: CYSTOSCOPY, LEFT URETEROSCOPY LASER LITHOTRIPSY STENT INSERTION;  Surgeon: Skyler Delvalle MD;  Location: [facility];  Service: Urology     OK LAP GASTRIC BYPASS/DERICK-EN-Y N/A 2016    Procedure: GASTRIC BYPASS LAPAROSCOPIC DERICK-EN-Y;  Surgeon: Randy Sutherland MD;  Location: [facility] OR;  Service: General     TUBAL LIGATION       WISDOM TOOTH EXTRACTION Bilateral       SOCIAL HISTORY:   Social History     Tobacco Use     Smoking status: Never Smoker     Smokeless tobacco: Never Used   Substance Use Topics     Alcohol use: Yes     Comment: occ.     Drug use: No     FAMILY HISTORY:   Family History   Problem Relation Age of Onset     Depression Paternal Grandmother      Diabetes Mother      Diabetes Father      Hypertension Father      Chronic Obstructive Pulmonary Disease Father      Cancer Maternal Grandmother         gastric     Urolithiasis Maternal Grandmother      Heart Disease Maternal Grandfather      Cancer Maternal Grandfather         lung     Heart  Disease Paternal Grandfather      Breast Cancer No family hx of      Colon Cancer No family hx of      Prostate Cancer No family hx of      Cerebrovascular Disease No family hx of      Clotting Disorder No family hx of      Gout No family hx of      ALLERGIES:   Allergies   Allergen Reactions     Bee Venom Swelling     Ibuprofen Hives and Swelling     CURRENT MEDICATIONS:   Current Outpatient Medications   Medication Instructions     acetaminophen (TYLENOL) 650 mg, Oral, EVERY 4 HOURS PRN     buPROPion (WELLBUTRIN SR) 150 mg, Oral     calcitRIOL (ROCALTROL) 0.5 MCG capsule No dose, route, or frequency recorded.     calcium carbonate 500 mg (elemental) (OSCAL) 500 mg, Oral, 2 TIMES DAILY     clonazePAM (KLONOPIN) 0.5 mg, Oral, 2 TIMES DAILY PRN     diazepam (VALIUM) 5 mg, Oral, ONCE PRN     fludrocortisone (FLORINEF) 0.2 mg, Oral, DAILY     FLUoxetine (PROZAC) 60 mg, Oral, DAILY     gabapentin (NEURONTIN) 900 mg, Oral, 3 TIMES DAILY     hydrocortisone (CORTEF) 10 MG tablet Take 3 tablets (30 MG) every morning and take 2 tablets (20 MG) by mouth every day at 2 PM. Additional as directed.     levothyroxine (SYNTHROID/LEVOTHROID) 125 mcg, Oral, DAILY     ondansetron (ZOFRAN) 8 mg, Oral, EVERY 8 HOURS PRN     prochlorperazine (COMPAZINE) 5 mg, Oral, EVERY 8 HOURS PRN     temazepam (RESTORIL) 15 mg, Oral, AT BEDTIME PRN     UNABLE TO FIND Other, medical cannabis (Patient's own supply. Not a prescription)       PHYSICAL EXAMINATION:  Vital signs: There were no vitals taken for this visit.   Gen: NAD, on video no distress  No other exam    Results for VALERY PANDYA (MRN [id number]) as of 9/13/2021 08:16   Ref. Range 9/3/2021 08:06   Sodium Latest Ref Range: 133 - 144 mmol/L 140   Potassium Latest Ref Range: 3.4 - 5.3 mmol/L 3.9   Chloride Latest Ref Range: 94 - 109 mmol/L 107   Carbon Dioxide Latest Ref Range: 20 - 32 mmol/L 28   Urea Nitrogen Latest Ref Range: 7 - 30 mg/dL 9   Creatinine Latest Ref Range: 0.52 -  1.04 mg/dL 0.79   GFR Estimate Latest Ref Range: >60 mL/min/1.73m2 >90   Calcium Latest Ref Range: 8.5 - 10.1 mg/dL 7.9 (L)   Anion Gap Latest Ref Range: 3 - 14 mmol/L 5   Albumin Latest Ref Range: 3.4 - 5.0 g/dL 3.0 (L)   Protein Total Latest Ref Range: 6.8 - 8.8 g/dL 5.9 (L)   Bilirubin Total Latest Ref Range: 0.2 - 1.3 mg/dL 0.2   Alkaline Phosphatase Latest Ref Range: 40 - 150 U/L 97   ALT Latest Ref Range: 0 - 50 U/L 17   AST Latest Ref Range: 0 - 45 U/L 18   Adrenal Corticotropin Latest Ref Range: <47 pg/mL <10   DHEA Sulfate Latest Ref Range: 35 - 430 ug/dL <15 (L)   T4 Free Latest Ref Range: 0.76 - 1.46 ng/dL 1.03   TSH Latest Ref Range: 0.40 - 4.00 mU/L 0.28 (L)   Glucose Latest Ref Range: 70 - 99 mg/dL 92   WBC Latest Ref Range: 4.0 - 11.0 10e3/uL 5.9   Hemoglobin Latest Ref Range: 11.7 - 15.7 g/dL 14.1   Hematocrit Latest Ref Range: 35.0 - 47.0 % 42.5   Platelet Count Latest Ref Range: 150 - 450 10e3/uL 148 (L)   RBC Count Latest Ref Range: 3.80 - 5.20 10e6/uL 4.48   MCV Latest Ref Range: 78 - 100 fL 95   MCH Latest Ref Range: 26.5 - 33.0 pg 31.5   MCHC Latest Ref Range: 31.5 - 36.5 g/dL 33.2   RDW Latest Ref Range: 10.0 - 15.0 % 13.2       IMAGING DATA:  8/25/21                                                     IMPRESSION:  1.  No evidence of recurrent or metastatic disease in the chest, abdomen or pelvis.  2.  Stable cortical thinning and hypoenhancement of the upper pole right kidney which most likely represents posttreatment change.    ASSESSMENT AND PLAN: A 35-year-old female with right-sided functioning high-risk adrenocortical carcinoma.     Adrenocortical carcinoma:  - Started on adjuvant mitotane in July 2018 based on her presentation and tumor characteristics including invasion of the adrenal capsule, high mitotic rate, possibly positive margins, and high Ki67, which could result in a rate of recurrence as high as 40%.    The mitotane was stopped by Dr. Hilton and endocrinologic oncologist  from [facility].  She had a level drawn on 5/10/2021, 6 days after discontinuation of mitotane with level of 10.5.  She informs that Dr. Liang would like to continue with mitotane levels every month and interval imaging every 3 months from now .  --Scans in three months, then the next ones ( in 2022) will be on four months  --will continue to follow with Dr Huertas ([facility]).    We will continue to hydrocortisone at 15/10.  She continues to see declines in the mitotane levels.  She is currently not taking it.     She is weaning from Florinef and bPs are ok.   Mild neuropathy pain, will try to wean gabapentin. This is from the Mitotane      #Speech: Speech therapy eval pending.       25 minutes on video with the patient and evaluating lab values.

## 2021-09-13 NOTE — LETTER
"    9/13/2021         RE: Suzy Rodríguez  [address]  [facility] [address]        Dear Colleague,    Thank you for referring your patient, Suzy Rodríguez, to the [facility]. Please see a copy of my visit note below.    Suzy is a 35 year old who is being evaluated via a billable video visit.      How would you like to obtain your AVS? MyChart  If the video visit is dropped, the invitation should be resent by: Text to cell phone: [phone]   Will anyone else be joining your video visit? Yes: Spouse. How would they like to receive their invitation? Other e-mail: NA      Video Start Time: 8:15 AM  Video-Visit Details    Type of service:  Video Visit    Video End Time:8:31 AM    Originating Location (pt. Location): Home    Distant Location (provider location):  [facility]     Platform used for Video Visit: Milton Almonte is a 35 year old who is being evaluated via a billable video visit.          [facility] Oncology Followup  8/2/21    REFERRING PROVIDER: Warren Mansfield MD from [facility] Urologic Oncology clinic.      REASON FOR CURRENT VISIT: Follow-up for adrenocortical carcinoma,    ONCOLOGIC HISTORY:  1. 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   - CT abdomen and pelvis stone protocol without contrast 5 8121 showed \"9.2 x 8 cm heterogeneous right upper quadrant mass with small foci of calcification within it which is likely arising from the adrenal gland though inseparable from liver and upper pole of right kidney. It is incompletely evaluated on this noncontrast study. 3 x 2.6 cm mass right lobe of liver on image #85 also incompletely evaluated. 6 x 4 x 4 mm upper third left ureteral obstructing stone with mild to moderate secondary hydronephrosis. " "Collection of stones at lower pole left kidney extending over an area of approximately 6 x 7 x 4 mm. Additional nonobstructing 1 mm stone upper pole left kidney. 2 mm nonobstructing stone noted upper pole right kidney with no hydronephrosis on the right. Postop change consistent with gastric bypass. Noncontrast images of spleen, left adrenal gland, gallbladder, and pancreas unremarkable. No aneurysm. No adenopathy.\"  - Seen by Dr. Delvalle at [facility] Urology clinic. Referred to Dr. Alvino Patel and then Dr. Warren Mansfield.  - Endocrinology team directed workup showed high DHEAS level of 740 pre-surgery.   - Right open adrenalectomy and hepatic mobilization performed by Dr. Warren Mansfield on 6/25/2018. Pathology showed adrenocortical carcinoma measuring 11.3 cm, weighing 413 g with extension into or through the adrenal capsule negative lymphovascular invasion, tumor necrosis present, margins involved by tumor, no regional lymph nodes identified, pathologic stage T2 NX.  pathology review reveals low grade ACC.  - DHEAS normalized postsurgery.   - Adjuvant Mitotane started on 7/24/18. Dose increased to 2000mg TID around 10/23/18 due to persistently low troughs. Held 1/16/19 for two weeks and resumed 1/30 at 1000 mg po BID due to very poor tolerance of higher doses. Highest mitotane level was 10.2 on 2/11/19. Mitotane troughs did not rise above 10 despite increase in dose to 1500 BID and then 1500+2000. Started 2000mg BID on 10/28/20. Increased to [id number] since around Christmas 2020.   - Saw radiation oncology at [facility], radiation oncology at [facility], as well as endocrine oncology (Dr. Huertas) at [facility].   - S/p adjuvant RT by Dr. Monsivais - 5040 cGy over 30 fr (8/24/18-10/6/18).  - 2/11/19: OSH CT C/A/P and MRI brain with contrast - no evidence of disease recurrence.   - 06/08/20, 09/14/20, 12/7/20, 3/16/2021: CT C/A/P with contrast - AFUA.    INTERVAL " HISTORY:  Ms. Rodríguez is a 35-year-old woman with history notable for right-sided functioning adrenocortical carcinoma (diagnosed in May 2018), who was  on adjuvant mitotane. Her mitotane [phone] since around Christmas 2020.  She is now off the mitotane and doing well. She is on Adrenal replacement and stable.      -125/70-80  No symptoms of light headedness while at rest.   Appt w Dr Huertas on Sept 7 - has appts with them quarterly    BPS high in clinic but ok at home - trying to come off florinef. 123/77 yesterday after holding the florinef  Has tried to reduce the dose of gabapentin but gets neuropathy pain.      REVIEW OF SYSTEMS: 14 point ROS negative other than the symptoms noted above in the HPI.    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 Mansfield.     Adrenal mass (H)      Anemia     thought due to heavy menses.     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  Check in May. SCP started.  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     SHREYA on CPAP 12/17/2015    Stopped using CPAP the Fall of 2016 after weight  "loss as even at lower pressure/adjustment couldn't tolerate the cpap. Resting well, cautioned to watch for any signs of fatigue and consider \"titration study\" in the future to see if cpap is still required at her new weight.     Pneumonia     hx of recurrent pneumonia issues/respiratory infections.     Polycystic ovary syndrome     able to get pregnant, not on meds.     Pulmonary embolism (H)  or so    briefly on wafarin.     Sleep apnea     cpap     Vitamin D deficiency    MHTFR mutation with h/o mutiple miscarriages.    PAST SURGICAL HISTORY:   Past Surgical History:   Procedure Laterality Date     ADRENALECTOMY Right 2018    Procedure: ADRENALECTOMY;  Right Adrenalectomy,  Embolize Liver , Anesthesia Block;  Surgeon: Warren Mansfield MD;  Location: [facility] OR     ADRENALECTOMY       ADRENALECTOMY Right       SECTION      twice      SECTION      2      SECTION      x2     EMBOLECTOMY ABDOMEN N/A 2018    Procedure: EMBOLECTOMY ABDOMEN;;  Surgeon: Ector Mcintyre MD;  Location: [facility] OR     EXTRACORPOREAL SHOCK WAVE LITHOTRIPSY (ESWL)       GASTRIC BYPASS       FL CYSTO/URETERO W/LITHOTRIPSY &INDWELL STENT INSRT Left 2018    Procedure: CYSTOSCOPY, LEFT URETEROSCOPY LASER LITHOTRIPSY STENT INSERTION;  Surgeon: Skyler Delvalle MD;  Location: [facility];  Service: Urology     FL LAP GASTRIC BYPASS/DERICK-EN-Y N/A 2016    Procedure: GASTRIC BYPASS LAPAROSCOPIC DERICK-EN-Y;  Surgeon: Randy Sutherland MD;  Location: [facility];  Service: General     TUBAL LIGATION       WISDOM TOOTH EXTRACTION Bilateral       SOCIAL HISTORY:   Social History     Tobacco Use     Smoking status: Never Smoker     Smokeless tobacco: Never Used   Substance Use Topics     Alcohol use: Yes     Comment: occ.     Drug use: No     FAMILY HISTORY:   Family History   Problem Relation Age of Onset     Depression Paternal Grandmother      Diabetes Mother      Diabetes Father      " Hypertension Father      Chronic Obstructive Pulmonary Disease Father      Cancer Maternal Grandmother         gastric     Urolithiasis Maternal Grandmother      Heart Disease Maternal Grandfather      Cancer Maternal Grandfather         lung     Heart Disease Paternal Grandfather      Breast Cancer No family hx of      Colon Cancer No family hx of      Prostate Cancer No family hx of      Cerebrovascular Disease No family hx of      Clotting Disorder No family hx of      Gout No family hx of      ALLERGIES:   Allergies   Allergen Reactions     Bee Venom Swelling     Ibuprofen Hives and Swelling     CURRENT MEDICATIONS:   Current Outpatient Medications   Medication Instructions     acetaminophen (TYLENOL) 650 mg, Oral, EVERY 4 HOURS PRN     buPROPion (WELLBUTRIN SR) 150 mg, Oral     calcitRIOL (ROCALTROL) 0.5 MCG capsule No dose, route, or frequency recorded.     calcium carbonate 500 mg (elemental) (OSCAL) 500 mg, Oral, 2 TIMES DAILY     clonazePAM (KLONOPIN) 0.5 mg, Oral, 2 TIMES DAILY PRN     diazepam (VALIUM) 5 mg, Oral, ONCE PRN     fludrocortisone (FLORINEF) 0.2 mg, Oral, DAILY     FLUoxetine (PROZAC) 60 mg, Oral, DAILY     gabapentin (NEURONTIN) 900 mg, Oral, 3 TIMES DAILY     hydrocortisone (CORTEF) 10 MG tablet Take 3 tablets (30 MG) every morning and take 2 tablets (20 MG) by mouth every day at 2 PM. Additional as directed.     levothyroxine (SYNTHROID/LEVOTHROID) 125 mcg, Oral, DAILY     ondansetron (ZOFRAN) 8 mg, Oral, EVERY 8 HOURS PRN     prochlorperazine (COMPAZINE) 5 mg, Oral, EVERY 8 HOURS PRN     temazepam (RESTORIL) 15 mg, Oral, AT BEDTIME PRN     UNABLE TO FIND Other, medical cannabis (Patient's own supply. Not a prescription)       PHYSICAL EXAMINATION:  Vital signs: There were no vitals taken for this visit.   Gen: NAD, on video no distress  No other exam    Results for MUMTAZ VALERY ISABEL (MRN [id number]) as of 9/13/2021 08:16   Ref. Range 9/3/2021 08:06   Sodium Latest Ref Range: 133 - 144  mmol/L 140   Potassium Latest Ref Range: 3.4 - 5.3 mmol/L 3.9   Chloride Latest Ref Range: 94 - 109 mmol/L 107   Carbon Dioxide Latest Ref Range: 20 - 32 mmol/L 28   Urea Nitrogen Latest Ref Range: 7 - 30 mg/dL 9   Creatinine Latest Ref Range: 0.52 - 1.04 mg/dL 0.79   GFR Estimate Latest Ref Range: >60 mL/min/1.73m2 >90   Calcium Latest Ref Range: 8.5 - 10.1 mg/dL 7.9 (L)   Anion Gap Latest Ref Range: 3 - 14 mmol/L 5   Albumin Latest Ref Range: 3.4 - 5.0 g/dL 3.0 (L)   Protein Total Latest Ref Range: 6.8 - 8.8 g/dL 5.9 (L)   Bilirubin Total Latest Ref Range: 0.2 - 1.3 mg/dL 0.2   Alkaline Phosphatase Latest Ref Range: 40 - 150 U/L 97   ALT Latest Ref Range: 0 - 50 U/L 17   AST Latest Ref Range: 0 - 45 U/L 18   Adrenal Corticotropin Latest Ref Range: <47 pg/mL <10   DHEA Sulfate Latest Ref Range: 35 - 430 ug/dL <15 (L)   T4 Free Latest Ref Range: 0.76 - 1.46 ng/dL 1.03   TSH Latest Ref Range: 0.40 - 4.00 mU/L 0.28 (L)   Glucose Latest Ref Range: 70 - 99 mg/dL 92   WBC Latest Ref Range: 4.0 - 11.0 10e3/uL 5.9   Hemoglobin Latest Ref Range: 11.7 - 15.7 g/dL 14.1   Hematocrit Latest Ref Range: 35.0 - 47.0 % 42.5   Platelet Count Latest Ref Range: 150 - 450 10e3/uL 148 (L)   RBC Count Latest Ref Range: 3.80 - 5.20 10e6/uL 4.48   MCV Latest Ref Range: 78 - 100 fL 95   MCH Latest Ref Range: 26.5 - 33.0 pg 31.5   MCHC Latest Ref Range: 31.5 - 36.5 g/dL 33.2   RDW Latest Ref Range: 10.0 - 15.0 % 13.2       IMAGING DATA:  8/25/21                                                     IMPRESSION:  1.  No evidence of recurrent or metastatic disease in the chest, abdomen or pelvis.  2.  Stable cortical thinning and hypoenhancement of the upper pole right kidney which most likely represents posttreatment change.    ASSESSMENT AND PLAN: A 35-year-old female with right-sided functioning high-risk adrenocortical carcinoma.     Adrenocortical carcinoma:  - Started on adjuvant mitotane in July 2018 based on her presentation and tumor  characteristics including invasion of the adrenal capsule, high mitotic rate, possibly positive margins, and high Ki67, which could result in a rate of recurrence as high as 40%.    The mitotane was stopped by Dr. Hilton and endocrinologic oncologist from [facility].  She had a level drawn on 5/10/2021, 6 days after discontinuation of mitotane with level of 10.5.  She informs that Dr. Liang would like to continue with mitotane levels every month and interval imaging every 3 months from now .  --Scans in three months, then the next ones ( in 2022) will be on four months  --will continue to follow with Dr Huertas ([facility]).    We will continue to hydrocortisone at 15/10.  She continues to see declines in the mitotane levels.  She is currently not taking it.     She is weaning from Florinef and bPs are ok.   Mild neuropathy pain, will try to wean gabapentin. This is from the Mitotane      #Speech: Speech therapy eval pending.       25 minutes on video with the patient and evaluating lab values.                Again, thank you for allowing me to participate in the care of your patient.        Sincerely,        Amando Shaw MD

## 2021-09-15 LAB
COLLECT DURATION TIME UR: 24 H
CORTIS 24H UR-MRATE: 44 MCG/24 H (ref 3.5–45)
CORTISONE 24H UR-MRATE: 57 MCG/24 H (ref 17–129)
SPECIMEN VOL 24H UR: 1500 ML

## 2021-09-24 PROBLEM — F41.9 ANXIETY: Status: ACTIVE | Noted: 2021-09-24

## 2021-09-25 ENCOUNTER — HEALTH MAINTENANCE LETTER (OUTPATIENT)
Age: 36
End: 2021-09-25

## 2021-09-27 ENCOUNTER — OFFICE VISIT (OUTPATIENT)
Dept: SURGERY | Facility: CLINIC | Age: 36
End: 2021-09-27
Attending: FAMILY MEDICINE
Payer: COMMERCIAL

## 2021-09-27 DIAGNOSIS — R10.11 RUQ ABDOMINAL PAIN: ICD-10-CM

## 2021-09-27 DIAGNOSIS — K80.12 CALCULUS OF GALLBLADDER WITH ACUTE ON CHRONIC CHOLECYSTITIS WITHOUT OBSTRUCTION: Primary | ICD-10-CM

## 2021-09-27 PROCEDURE — 99204 OFFICE O/P NEW MOD 45 MIN: CPT | Performed by: SPECIALIST

## 2021-09-27 NOTE — LETTER
9/27/2021         RE: Suzy Rodríguez  [address]  [place] [address]        Dear Colleague,    Thank you for referring your patient, Suzy Rodríguez, to the [facility] [place]. Please see a copy of my visit note below.    Office Gallbladder Consult    HPI: I am consulted in this 35 year old female who has been experiencing some problems with abdominal pain. she has noted this for about few months. It has been occurring about 1-2 times per week. It is moderate pain.  It is precipitated by diet. It is associated with nausea or vomiting.   It is not associated with chills or fevers. Here for consult about options for treatment.    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 Mansfield.     Adrenal mass (H)      Anemia     thought due to heavy menses.     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  Check in May. SCP started.  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     SHREYA on CPAP 12/17/2015    Stopped using CPAP the Fall of 2016 after weight loss as even at lower  "pressure/adjustment couldn't tolerate the cpap. Resting well, cautioned to watch for any signs of fatigue and consider \"titration study\" in the future to see if cpap is still required at her new weight.     Pneumonia     hx of recurrent pneumonia issues/respiratory infections.     Polycystic ovary syndrome     able to get pregnant, not on meds.     Pulmonary embolism (H)  or so    briefly on wafarin.     Sleep apnea     cpap     Vitamin D deficiency        Past Surgical History:   Procedure Laterality Date     ADRENALECTOMY Right 2018    Procedure: ADRENALECTOMY;  Right Adrenalectomy,  Embolize Liver , Anesthesia Block;  Surgeon: Warren Mansfield MD;  Location: [facility] OR     ADRENALECTOMY       ADRENALECTOMY Right       SECTION      twice      SECTION      2      SECTION      x2     EMBOLECTOMY ABDOMEN N/A 2018    Procedure: EMBOLECTOMY ABDOMEN;;  Surgeon: Ector Mcintyre MD;  Location: [facility] OR     EXTRACORPOREAL SHOCK WAVE LITHOTRIPSY (ESWL)       GASTRIC BYPASS       WI CYSTO/URETERO W/LITHOTRIPSY &INDWELL STENT INSRT Left 2018    Procedure: CYSTOSCOPY, LEFT URETEROSCOPY LASER LITHOTRIPSY STENT INSERTION;  Surgeon: Skyler Delvalle MD;  Location: [facility];  Service: Urology     WI LAP GASTRIC BYPASS/DERICK-EN-Y N/A 2016    Procedure: GASTRIC BYPASS LAPAROSCOPIC DERICK-EN-Y;  Surgeon: Randy Sutherland MD;  Location: [facility] OR;  Service: General     TUBAL LIGATION       WISDOM TOOTH EXTRACTION Bilateral        CURRENT MEDS:      Family History   Problem Relation Age of Onset     Depression Paternal Grandmother      Diabetes Mother      Diabetes Father      Hypertension Father      Chronic Obstructive Pulmonary Disease Father      Cancer Maternal Grandmother         gastric     Urolithiasis Maternal Grandmother      Heart Disease Maternal Grandfather      Cancer Maternal Grandfather         lung     Heart Disease Paternal Grandfather      " Breast Cancer No family hx of      Colon Cancer No family hx of      Prostate Cancer No family hx of      Cerebrovascular Disease No family hx of      Clotting Disorder No family hx of      Gout No family hx of         reports that she has never smoked. She has never used smokeless tobacco. She reports current alcohol use. She reports that she does not use drugs.    Review of Systems - Pertinent items are noted in HPI.    There were no vitals taken for this visit.      EXAM:  GENERAL: This is a well developed female in no distress.  MOUTH: Mucus membranes moist  SKIN: Grossly intact  EYES: No icterus  CARDIAC: RRR w/out murmur  CHEST/LUNG: Clear  ABDOMEN: Soft, nontender RUQ non-tender, B/S present, Hernandez's sign negative  BACK: No costovertebral tenderness  NEURO: Alert and oriented, nonfocal  PSYCHIATRIC: normal affect      LABS:  Lab Results   Component Value Date    WBC 5.9 09/03/2021    WBC 4.2 07/05/2021    HGB 14.1 09/03/2021    HGB 15.0 07/05/2021    HCT 42.5 09/03/2021    HCT 45.9 07/05/2021    MCV 95 09/03/2021    MCV 97 07/05/2021     09/03/2021     07/05/2021     INR/Prothrombin Time  @LABRCNTIP(NA,K,CL,co2,bun,creatinine,labglom,glucose,calcium)@  Lab Results   Component Value Date    ALT 17 09/03/2021    AST 18 09/03/2021    ALKPHOS 97 09/03/2021       IMAGES:     Assessment/Plan:     Pt with signs and symptoms consistent with symptomatic cholelithiasis. I have recommended a laparoscopic cholecystecomy. I discussed with her that we might not be able to do this laparoscopically with low risk of going open. I went over some of the risks of surgery including but not limited to bleeding, infection and bile duct injury and anesthesia.     Brent Crook MD ,MD  [facility] Department of Surgery      Again, thank you for allowing me to participate in the care of your patient.        Sincerely,        Brent Crook MD

## 2021-09-30 ENCOUNTER — VIRTUAL VISIT (OUTPATIENT)
Dept: PSYCHOLOGY | Facility: CLINIC | Age: 36
End: 2021-09-30
Attending: FAMILY MEDICINE
Payer: COMMERCIAL

## 2021-09-30 DIAGNOSIS — F33.1 MAJOR DEPRESSIVE DISORDER, RECURRENT EPISODE, MODERATE (H): ICD-10-CM

## 2021-09-30 DIAGNOSIS — F41.1 GENERALIZED ANXIETY DISORDER: ICD-10-CM

## 2021-09-30 DIAGNOSIS — F88 DEVELOPMENTAL MENTAL DISORDER: Primary | ICD-10-CM

## 2021-09-30 PROCEDURE — 90834 PSYTX W PT 45 MINUTES: CPT | Mod: 95 | Performed by: PSYCHOLOGIST

## 2021-09-30 ASSESSMENT — PATIENT HEALTH QUESTIONNAIRE - PHQ9
5. POOR APPETITE OR OVEREATING: NEARLY EVERY DAY
SUM OF ALL RESPONSES TO PHQ QUESTIONS 1-9: 23

## 2021-09-30 ASSESSMENT — ANXIETY QUESTIONNAIRES
5. BEING SO RESTLESS THAT IT IS HARD TO SIT STILL: NEARLY EVERY DAY
7. FEELING AFRAID AS IF SOMETHING AWFUL MIGHT HAPPEN: NEARLY EVERY DAY
1. FEELING NERVOUS, ANXIOUS, OR ON EDGE: NEARLY EVERY DAY
6. BECOMING EASILY ANNOYED OR IRRITABLE: NEARLY EVERY DAY
3. WORRYING TOO MUCH ABOUT DIFFERENT THINGS: NEARLY EVERY DAY
2. NOT BEING ABLE TO STOP OR CONTROL WORRYING: MORE THAN HALF THE DAYS
GAD7 TOTAL SCORE: 20

## 2021-09-30 ASSESSMENT — COLUMBIA-SUICIDE SEVERITY RATING SCALE - C-SSRS
2. HAVE YOU ACTUALLY HAD ANY THOUGHTS OF KILLING YOURSELF LIFETIME?: NO
1. IN THE PAST MONTH, HAVE YOU WISHED YOU WERE DEAD OR WISHED YOU COULD GO TO SLEEP AND NOT WAKE UP?: YES
2. HAVE YOU ACTUALLY HAD ANY THOUGHTS OF KILLING YOURSELF?: NO
1. IN THE PAST MONTH, HAVE YOU WISHED YOU WERE DEAD OR WISHED YOU COULD GO TO SLEEP AND NOT WAKE UP?: NO

## 2021-09-30 NOTE — PROGRESS NOTES
Progress Note - Initial Visit    Client Name:  Suzy Rodríguez Date: 2021         Service Type: Individual     Visit Start Time: 9:00am  Visit End Time: 9:50am    Visit #: 1    Attendees: Client attended alone    Service Modality:  Video Visit:      Provider verified identity through the following two step process.  Patient provided:  Patient photo and Patient     Telemedicine Visit: The patient's condition can be safely assessed and treated via synchronous audio and visual telemedicine encounter.      Reason for Telemedicine Visit: Services only offered telehealth    Originating Site (Patient Location): Patient's home    Distant Site (Provider Location): Provider Remote Setting- Home Office    Consent:  The patient/guardian has verbally consented to: the potential risks and benefits of telemedicine (video visit) versus in person care; bill my insurance or make self-payment for services provided; and responsibility for payment of non-covered services.     Patient would like the video invitation sent by:  Send to e-mail at: [email or web address]    Mode of Communication:  Video Conference via Amwell    As the provider I attest to compliance with applicable laws and regulations related to telemedicine.       DATA:   Interactive Complexity: No   Crisis: No     Presenting Concerns/Current Stressors:   Patient presented to session to initiate the ADHD evaluation process.      ASSESSMENT:  Mental Status Assessment:  Appearance:   Appropriate   Eye Contact:   Good   Psychomotor Behavior: Normal   Attitude:   Cooperative   Orientation:   All  Speech   Rate / Production: Normal/ Responsive   Volume:  Normal   Mood:    Normal  Affect:    Appropriate   Thought Content:  Clear   Thought Form:  Logical   Insight:    Good       Safety Issues and Plan for Safety and Risk Management:     [place] Suicide Severity Rating Scale (Lifetime/Recent)  [place] Suicide Severity Rating (Lifetime/Recent)  5/15/2020 9/30/2021   1. Wish to be Dead (Lifetime) - Yes   Wish to be Dead Description (Lifetime) - About 13-14 years ago, no plan developed.   1. Wish to be Dead (Recent) - No   2. Non-Specific Active Suicidal Thoughts (Lifetime) - No   2. Non-Specific Active Suicidal Thoughts (Recent) - No   Comments age 12 -   Some encounter information is confidential and restricted. Go to Review Flowsheets activity to see all data.     Patient denies current fears or concerns for personal safety.  Patient denies current or recent suicidal ideation or behaviors.  Patient denies current or recent homicidal ideation or behaviors.  Patient denies current or recent self injurious behavior or ideation.  Patient denies other safety concerns.  Recommended that patient call 911 or go to the local ED should there be a change in any of these risk factors.       Diagnostic Criteria:  F88:  A. A persistent pattern of inattention and/or hyperactivity-impulsivity that interferes with functioning or development, as characterized by (1) and/or (2):   1. Six or more inattention symptoms that have persisted for at least 6 months to a degree that is inconsistent with developmental level and that negatively impacts directly on social and academic/occupational activities.   2. Six or more hyperactivity and impulsivity symptoms that have persisted for at least 6 months to a degree that is inconsistent with developmental level and that negatively impacts directly on social and academic/occupational activities.  B. Several symptoms (inattentive or hyperactive/impulsive) were present before the age of 12 years.  C. Several symptoms (inattentive or hyperactive/impulsive) present in ?2 settings (eg, at home, school, or work; with friends or relatives; in other activities).  D. There is clear evidence that the symptoms interfere with or reduce the quality of social, academic, or occupational functioning.  E. Symptoms do not occur exclusively during the  course of schizophrenia or another psychotic disorder, and are not better explained by another mental disorder (eg, mood disorder, anxiety disorder, dissociative disorder, personality disorder, substance intoxication, or withdrawal).    F41.1:  A. Excessive anxiety and worry, occurring more days than not for at least 6 months about a number of events or activities.   B. The individual finds it difficult to control the worry.  C. The anxiety and worry are associated with 3 or more of 6 symptoms.  D. The anxiety, worry, or physical symptoms cause clinically significant distress or impairment in social, occupational, or other important areas of functioning.  E. The disturbance is not attributable to the physiological effects of a substance (e.g., a drug of abuse, a medication) or another medical condition (e.g., hyperthyroidism).  F. The disturbance is not better explained by another mental disorder (e.g., anxiety or worry about having panic attacks in panic disorder, negative evaluation in social anxiety disorder [social phobia], contamination or other obsessions in obsessive-compulsive disorder, separation from attachment figures in separation anxiety disorder, reminders of traumatic events in posttraumatic stress disorder, gaining weight in anorexia nervosa, physical complaints in somatic symptom disorder, perceived appearance flaws in body dysmorphic disorder, having a serious illness in illness anxiety disorder, or the content of delusional beliefs in schizophrenia or delusional disorder).    F33.1:  A. Five (or more) symptoms have been present during the same 2-week period and represent a change from previous functioning; at least one of the symptoms is either (1) depressed mood or (2) loss of interest or pleasure.   1. Depressed mood.   2. Diminished interest or pleasure in all, or almost all, activities.   3. Significant appetite change.  4. Significant sleep change.   5. Fatigue or loss of energy.    6. Feelings of worthlessness or inappropriate guilt.   7. Diminished ability to think or concentrate, or indecisiveness.   8. Psychomotor agitation or lethargy.   B. The symptoms cause clinically significant distress or impairment in social, occupational, or other important areas of functioning.  C. The episode is not attributable to the physiological effects of a substance or to another medical condition.  D. The occurence of major depressive episode is not better explained by other thought / psychotic disorders.  E. There has never been a manic episode or hypomanic episode.       DSM5 Diagnoses: (Sustained by DSM5 Criteria Listed Above)  Diagnoses:   1. Developmental mental disorder    2. Generalized anxiety disorder    3. Major depressive disorder, recurrent episode, moderate (H)      Psychosocial & Contextual Factors: employed full-time, had chemo toxicity in the past, possible lack of social support  WHODAS 2.0 (12 item):   WHODAS 2.0 Total Score 9/30/2021   Total Score 48   Some encounter information is confidential and restricted. Go to Review Flowsheets activity to see all data.       Intervention:              Reviewed symptoms and history of presenting concern. A diagnostic assessment was recently completed on 5/15/2020 with Monet Curry PsyD, LP. Therefore, a DA will not be completed as a component of the current assessment.   CBT: socratic questioning, positive reinforcement  EFT: empathetic attunement, emotion checking  MI: open ended questions, affirmations, reflections        Attendance Agreement:  Client has not signed the attendance agreement. Discussed expectations at beginning of this first session and patient agreed.       PLAN:  Patient will complete Larry questionnaires prior to next session (10/7/2021).    Medical necessity criteria is warranted in order to: Measure a psychological disorder and its severity and functional impairment to determine psychiatric diagnosis when a mental  illness is suspected, or to achieve a differential diagnosis from a range of medical/psychological disorders that present with similar constellations of symptoms (e.g., determination and measurement of anxiety severity and impact in the presence of ongoing asthma or heart disease), Perform symptom measurement to objectively measure treatment effectiveness and/or determine the need to refer for pharmacological treatment or other medical evaluation (e.g., based on severity and chronicity of symptoms) and Evaluate primary symptoms of impaired attention and concentration that can occur in many neurological and psychiatric conditions    Medical necessity for psychological assessment is warranted as a result of the following: (1) A specific clinical question is posed that relates to the condition/symptoms being addressed (2) The question cannot be adequately addressed by clinical interview and/or behavioral observation (3) Results of psychological testing are reasonably expected to provide an answer to the query (4) It is reasonably expected that the testing will provide information leading to a clearer diagnosis and/or guide treatment planning with an expectation of improved clinical outcome.    I acknowledge that, based upon current clinical information, the patient and I have reviewed and discussed issues pertaining to the purpose of therapy/testing, potential therapeutic goals, procedures, risks and benefits and estimated duration of therapy/testing. Issues pertaining to fees and confidentiality were also addressed with the patient indicated understanding. The patient was informed that their symptoms may change during the course of assessment, for better or worse, and this may impact the clinical approach and/or the duration of treatment; the patient understands that it is imperative that I am kept informed of the changes when they occur. I will not be providing any experimental procedures and, if we agree that a  change in clinical procedure would be more beneficial, I will obtain specific consent for that procedure or refer you to another provider who has expertise in that area.       Hailey Coleman PsyD, LP  Clinical Psychologist

## 2021-10-01 ASSESSMENT — ANXIETY QUESTIONNAIRES: GAD7 TOTAL SCORE: 20

## 2021-10-03 DIAGNOSIS — C74.91 MALIGNANT NEOPLASM OF ADRENAL GLAND, RIGHT (H): Primary | ICD-10-CM

## 2021-10-03 DIAGNOSIS — E27.40 UNSPECIFIED ADRENOCORTICAL INSUFFICIENCY (H): ICD-10-CM

## 2021-10-05 ENCOUNTER — TELEPHONE (OUTPATIENT)
Dept: SURGERY | Facility: CLINIC | Age: 36
End: 2021-10-05
Payer: COMMERCIAL

## 2021-10-05 ENCOUNTER — HOSPITAL ENCOUNTER (OUTPATIENT)
Facility: HOSPITAL | Age: 36
End: 2021-10-05
Attending: SPECIALIST | Admitting: SPECIALIST
Payer: COMMERCIAL

## 2021-10-05 NOTE — PROGRESS NOTES
"Office Gallbladder Consult    HPI: I am consulted in this 35 year old female who has been experiencing some problems with abdominal pain. she has noted this for about few months. It has been occurring about 1-2 times per week. It is moderate pain.  It is precipitated by diet. It is associated with nausea or vomiting.   It is not associated with chills or fevers. Here for consult about options for treatment.    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 Mansfield.     Adrenal mass (H)      Anemia     thought due to heavy menses.     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  Check in May. SCP started.  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     SHREYA on CPAP 12/17/2015    Stopped using CPAP the Fall of 2016 after weight loss as even at lower pressure/adjustment couldn't tolerate the cpap. Resting well, cautioned to watch for any signs of fatigue and consider \"titration study\" in the future to see if cpap is still required at her new weight.     Pneumonia     hx of recurrent pneumonia issues/respiratory infections.     " Polycystic ovary syndrome     able to get pregnant, not on meds.     Pulmonary embolism (H)  or so    briefly on wafarin.     Sleep apnea     cpap     Vitamin D deficiency        Past Surgical History:   Procedure Laterality Date     ADRENALECTOMY Right 2018    Procedure: ADRENALECTOMY;  Right Adrenalectomy,  Embolize Liver , Anesthesia Block;  Surgeon: Warren Mansfield MD;  Location: [facility] OR     ADRENALECTOMY       ADRENALECTOMY Right       SECTION      twice      SECTION      2      SECTION      x2     EMBOLECTOMY ABDOMEN N/A 2018    Procedure: EMBOLECTOMY ABDOMEN;;  Surgeon: Ector Mcintyre MD;  Location: [facility] OR     EXTRACORPOREAL SHOCK WAVE LITHOTRIPSY (ESWL)       GASTRIC BYPASS       OR CYSTO/URETERO W/LITHOTRIPSY &INDWELL STENT INSRT Left 2018    Procedure: CYSTOSCOPY, LEFT URETEROSCOPY LASER LITHOTRIPSY STENT INSERTION;  Surgeon: Skyler Delvalle MD;  Location: [facility];  Service: Urology     OR LAP GASTRIC BYPASS/DERICK-EN-Y N/A 2016    Procedure: GASTRIC BYPASS LAPAROSCOPIC DERICK-EN-Y;  Surgeon: Randy Sutherland MD;  Location: [facility] OR;  Service: General     TUBAL LIGATION       WISDOM TOOTH EXTRACTION Bilateral        CURRENT MEDS:      Family History   Problem Relation Age of Onset     Depression Paternal Grandmother      Diabetes Mother      Diabetes Father      Hypertension Father      Chronic Obstructive Pulmonary Disease Father      Cancer Maternal Grandmother         gastric     Urolithiasis Maternal Grandmother      Heart Disease Maternal Grandfather      Cancer Maternal Grandfather         lung     Heart Disease Paternal Grandfather      Breast Cancer No family hx of      Colon Cancer No family hx of      Prostate Cancer No family hx of      Cerebrovascular Disease No family hx of      Clotting Disorder No family hx of      Gout No family hx of         reports that she has never smoked. She has never used smokeless  tobacco. She reports current alcohol use. She reports that she does not use drugs.    Review of Systems - Pertinent items are noted in HPI.    There were no vitals taken for this visit.      EXAM:  GENERAL: This is a well developed female in no distress.  MOUTH: Mucus membranes moist  SKIN: Grossly intact  EYES: No icterus  CARDIAC: RRR w/out murmur  CHEST/LUNG: Clear  ABDOMEN: Soft, nontender RUQ non-tender, B/S present, Hernandez's sign negative  BACK: No costovertebral tenderness  NEURO: Alert and oriented, nonfocal  PSYCHIATRIC: normal affect      LABS:  Lab Results   Component Value Date    WBC 5.9 09/03/2021    WBC 4.2 07/05/2021    HGB 14.1 09/03/2021    HGB 15.0 07/05/2021    HCT 42.5 09/03/2021    HCT 45.9 07/05/2021    MCV 95 09/03/2021    MCV 97 07/05/2021     09/03/2021     07/05/2021     INR/Prothrombin Time  @LABRCNTIP(NA,K,CL,co2,bun,creatinine,labglom,glucose,calcium)@  Lab Results   Component Value Date    ALT 17 09/03/2021    AST 18 09/03/2021    ALKPHOS 97 09/03/2021       IMAGES:     Assessment/Plan:     Pt with signs and symptoms consistent with symptomatic cholelithiasis. I have recommended a laparoscopic cholecystecomy. I discussed with her that we might not be able to do this laparoscopically with low risk of going open. I went over some of the risks of surgery including but not limited to bleeding, infection and bile duct injury and anesthesia.     Brent Crook MD ,MD  [facility] Department of Surgery

## 2021-10-07 ENCOUNTER — VIRTUAL VISIT (OUTPATIENT)
Dept: PSYCHOLOGY | Facility: CLINIC | Age: 36
End: 2021-10-07
Attending: FAMILY MEDICINE
Payer: COMMERCIAL

## 2021-10-07 DIAGNOSIS — F88 DEVELOPMENTAL MENTAL DISORDER: Primary | ICD-10-CM

## 2021-10-07 DIAGNOSIS — F33.1 MAJOR DEPRESSIVE DISORDER, RECURRENT EPISODE, MODERATE (H): ICD-10-CM

## 2021-10-07 DIAGNOSIS — F41.1 GENERALIZED ANXIETY DISORDER: ICD-10-CM

## 2021-10-07 PROCEDURE — 90834 PSYTX W PT 45 MINUTES: CPT | Mod: 95 | Performed by: PSYCHOLOGIST

## 2021-10-07 NOTE — PROGRESS NOTES
Progress Note    Patient Name: Suzy Rodríguez  Date: 2021       Service Type: Individual      Session Start Time: 8:00am  Session End Time:  8:39am     Session Length: 39 minutes    Session #: 2    Attendees: Client attended alone    Service Modality:  Video Visit:      Provider verified identity through the following two step process.  Patient provided:  Patient photo and Patient     Telemedicine Visit: The patient's condition can be safely assessed and treated via synchronous audio and visual telemedicine encounter.      Reason for Telemedicine Visit: Services only offered telehealth    Originating Site (Patient Location): Patient's home    Distant Site (Provider Location): Provider Remote Setting- Home Office    Consent:  The patient/guardian has verbally consented to: the potential risks and benefits of telemedicine (video visit) versus in person care; bill my insurance or make self-payment for services provided; and responsibility for payment of non-covered services.     Patient would like the video invitation sent by:  Send to e-mail at: [email or web address]    Mode of Communication:  Video Conference via Amwell    As the provider I attest to compliance with applicable laws and regulations related to telemedicine.      PHQ 2020 9/3/2021 2021   PHQ-9 Total Score 23 20 23   Q9: Thoughts of better off dead/self-harm past 2 weeks Several days Not at all Not at all       KIMBERLY-7 SCORE 12/15/2020 9/3/2021 2021   Total Score 21 16 20         DATA  Interactive Complexity: No  Crisis: No       Progress Since Last Session (Related to Symptoms / Goals / Homework):   Symptoms: Stable, no change.    Homework: Patient completed and submitted Larry questionnaires as requested.      Current / Ongoing Stressors and Concerns:   Discussed manifestations of attention and concentration problems in various areas of her life.                  Treatment  Objective(s) Addressed in This Session:          Continued with testing process - patient was emailed the MMPI-2 and will complete before feedback.                 Intervention:              Continued information gathering specific to ADHD and mood symptoms. Briefly reviewed Larry questionnaires. Ended session by discussing MMPI-2 and feedback process.   CBT: socratic questioning, positive reinforcement  EFT: empathetic attunement, emotion checking  MI: open ended questions, affirmations, reflections       ASSESSMENT: Current Emotional / Mental Status (status of significant symptoms):   Risk status (Self / Other harm or suicidal ideation)   Patient denies current fears or concerns for personal safety.   Patient denies current or recent suicidal ideation or behaviors.   Patient denies current or recent homicidal ideation or behaviors.   Patient denies current or recent self injurious behavior or ideation.   Patient denies other safety concerns.   Patient reports there has been no change in risk factors since their last session.     Patient reports there has been no change in protective factors since their last session.     Recommended that patient call 911 or go to the local ED should there be a change in any of these risk factors.     Appearance:   Appropriate    Eye Contact:   Good    Psychomotor Behavior: Normal    Attitude:   Cooperative    Orientation:   All   Speech    Rate / Production: Normal     Volume:  Normal    Mood:    Normal   Affect:    Appropriate    Thought Content:  Clear    Thought Form:  Coherent  Logical    Insight:    Good      Medication Review:   No changes to current psychiatric medication(s)     Medication Compliance:   Yes     Changes in Health Issues:   None reported     Chemical Use Review:   Substance Use: Chemical use reviewed, no active concerns identified      Tobacco Use: No current tobacco use.      Diagnosis:  1. Developmental mental disorder    2. Generalized anxiety disorder     3. Major depressive disorder, recurrent episode, moderate (H)        Collateral Reports Completed:   Received Larry questionnaire from collateral (sister).      PLAN:   Patient completed Larry questionnaires. Patient will complete MMPI-2 before feedback session is scheduled. Patient was made aware that the MMPI-2 needs to be completed as soon as possible.  If it is not completed within one and two weeks, email reminders will be sent directly.  The patient was also made aware that the link expires after 30 days and if the test is not completed within that timeframe, it will be her responsibility to reinitiate contact to resume the testing process.  My contact information was provided.  Patient was in agreement to this plan.      Hailey Coleman PsyD, LP  Clinical Psychologist

## 2021-10-08 ENCOUNTER — MYC MEDICAL ADVICE (OUTPATIENT)
Dept: FAMILY MEDICINE | Facility: CLINIC | Age: 36
End: 2021-10-08

## 2021-10-08 DIAGNOSIS — F51.01 PRIMARY INSOMNIA: ICD-10-CM

## 2021-10-08 DIAGNOSIS — C74.01 MALIGNANT NEOPLASM OF CORTEX OF RIGHT ADRENAL GLAND (H): ICD-10-CM

## 2021-10-08 RX ORDER — TEMAZEPAM 15 MG/1
15 CAPSULE ORAL
Qty: 30 CAPSULE | Refills: 0 | Status: SHIPPED | OUTPATIENT
Start: 2021-10-08 | End: 2021-10-18

## 2021-10-08 NOTE — TELEPHONE ENCOUNTER
Patient would like meds to [facility] pharmacy.    Thank you,    Rachael Montgomery, [facility]

## 2021-10-18 ENCOUNTER — MYC MEDICAL ADVICE (OUTPATIENT)
Dept: FAMILY MEDICINE | Facility: CLINIC | Age: 36
End: 2021-10-18

## 2021-10-18 DIAGNOSIS — C74.01 MALIGNANT NEOPLASM OF CORTEX OF RIGHT ADRENAL GLAND (H): ICD-10-CM

## 2021-10-18 DIAGNOSIS — F41.9 ANXIETY: ICD-10-CM

## 2021-10-18 DIAGNOSIS — F51.01 PRIMARY INSOMNIA: ICD-10-CM

## 2021-10-18 RX ORDER — TEMAZEPAM 15 MG/1
15 CAPSULE ORAL
Qty: 30 CAPSULE | Refills: 0 | Status: SHIPPED | OUTPATIENT
Start: 2021-10-18 | End: 2021-11-17

## 2021-10-18 RX ORDER — GABAPENTIN 300 MG/1
900 CAPSULE ORAL 3 TIMES DAILY
Qty: 810 CAPSULE | Refills: 1 | Status: SHIPPED | OUTPATIENT
Start: 2021-10-18 | End: 2021-11-17

## 2021-10-18 NOTE — TELEPHONE ENCOUNTER
Patient called and just wanted to make sure we got her mychart and to let us know she needs these asap if possible please.        Daisy Spencer, ELAYNE Johnston

## 2021-10-25 ENCOUNTER — VIRTUAL VISIT (OUTPATIENT)
Dept: PSYCHOLOGY | Facility: CLINIC | Age: 36
End: 2021-10-25
Payer: COMMERCIAL

## 2021-10-25 ENCOUNTER — OFFICE VISIT (OUTPATIENT)
Dept: FAMILY MEDICINE | Facility: CLINIC | Age: 36
End: 2021-10-25
Payer: COMMERCIAL

## 2021-10-25 ENCOUNTER — PATIENT OUTREACH (OUTPATIENT)
Dept: ONCOLOGY | Facility: CLINIC | Age: 36
End: 2021-10-25

## 2021-10-25 VITALS
DIASTOLIC BLOOD PRESSURE: 85 MMHG | SYSTOLIC BLOOD PRESSURE: 123 MMHG | RESPIRATION RATE: 16 BRPM | WEIGHT: 158.4 LBS | HEIGHT: 64 IN | HEART RATE: 78 BPM | BODY MASS INDEX: 27.04 KG/M2

## 2021-10-25 DIAGNOSIS — F33.1 MAJOR DEPRESSIVE DISORDER, RECURRENT EPISODE, MODERATE (H): ICD-10-CM

## 2021-10-25 DIAGNOSIS — F41.1 GENERALIZED ANXIETY DISORDER: ICD-10-CM

## 2021-10-25 DIAGNOSIS — E27.40 UNSPECIFIED ADRENOCORTICAL INSUFFICIENCY (H): ICD-10-CM

## 2021-10-25 DIAGNOSIS — F90.2 ATTENTION DEFICIT HYPERACTIVITY DISORDER, COMBINED TYPE, MODERATE: Primary | ICD-10-CM

## 2021-10-25 DIAGNOSIS — C74.01 MALIGNANT NEOPLASM OF CORTEX OF RIGHT ADRENAL GLAND (H): Primary | ICD-10-CM

## 2021-10-25 DIAGNOSIS — Z23 NEED FOR PROPHYLACTIC VACCINATION AND INOCULATION AGAINST INFLUENZA: ICD-10-CM

## 2021-10-25 DIAGNOSIS — C74.91 MALIGNANT NEOPLASM OF ADRENAL GLAND, RIGHT (H): ICD-10-CM

## 2021-10-25 LAB
ALBUMIN SERPL-MCNC: 3.5 G/DL (ref 3.5–5)
ALP SERPL-CCNC: 103 U/L (ref 45–120)
ALT SERPL W P-5'-P-CCNC: 17 U/L (ref 0–45)
ANION GAP SERPL CALCULATED.3IONS-SCNC: 12 MMOL/L (ref 5–18)
AST SERPL W P-5'-P-CCNC: 22 U/L (ref 0–40)
BASOPHILS # BLD AUTO: 0 10E3/UL (ref 0–0.2)
BASOPHILS NFR BLD AUTO: 1 %
BILIRUB SERPL-MCNC: 0.3 MG/DL (ref 0–1)
BUN SERPL-MCNC: 9 MG/DL (ref 8–22)
CALCIUM SERPL-MCNC: 8.7 MG/DL (ref 8.5–10.5)
CHLORIDE BLD-SCNC: 104 MMOL/L (ref 98–107)
CO2 SERPL-SCNC: 23 MMOL/L (ref 22–31)
CREAT SERPL-MCNC: 0.75 MG/DL (ref 0.6–1.1)
EOSINOPHIL # BLD AUTO: 0 10E3/UL (ref 0–0.7)
EOSINOPHIL NFR BLD AUTO: 1 %
ERYTHROCYTE [DISTWIDTH] IN BLOOD BY AUTOMATED COUNT: 13.1 % (ref 10–15)
GFR SERPL CREATININE-BSD FRML MDRD: >90 ML/MIN/1.73M2
GLUCOSE BLD-MCNC: 84 MG/DL (ref 70–125)
HCT VFR BLD AUTO: 43 % (ref 35–47)
HGB BLD-MCNC: 14.1 G/DL (ref 11.7–15.7)
IMM GRANULOCYTES # BLD: 0 10E3/UL
IMM GRANULOCYTES NFR BLD: 0 %
LYMPHOCYTES # BLD AUTO: 1 10E3/UL (ref 0.8–5.3)
LYMPHOCYTES NFR BLD AUTO: 25 %
Lab: NORMAL
Lab: NORMAL
MCH RBC QN AUTO: 30.7 PG (ref 26.5–33)
MCHC RBC AUTO-ENTMCNC: 32.8 G/DL (ref 31.5–36.5)
MCV RBC AUTO: 94 FL (ref 78–100)
MONOCYTES # BLD AUTO: 0.2 10E3/UL (ref 0–1.3)
MONOCYTES NFR BLD AUTO: 6 %
NEUTROPHILS # BLD AUTO: 2.9 10E3/UL (ref 1.6–8.3)
NEUTROPHILS NFR BLD AUTO: 68 %
PERFORMING LABORATORY: NORMAL
PERFORMING LABORATORY: NORMAL
PLATELET # BLD AUTO: 201 10E3/UL (ref 150–450)
POTASSIUM BLD-SCNC: 4.3 MMOL/L (ref 3.5–5)
PROT SERPL-MCNC: 6 G/DL (ref 6–8)
RBC # BLD AUTO: 4.6 10E6/UL (ref 3.8–5.2)
SODIUM SERPL-SCNC: 139 MMOL/L (ref 136–145)
SPECIMEN STATUS: NORMAL
SPECIMEN STATUS: NORMAL
T4 FREE SERPL-MCNC: 1.04 NG/DL (ref 0.7–1.8)
TEST NAME: NORMAL
TEST NAME: NORMAL
TSH SERPL DL<=0.005 MIU/L-ACNC: 0.35 UIU/ML (ref 0.3–5)
WBC # BLD AUTO: 4.2 10E3/UL (ref 4–11)

## 2021-10-25 PROCEDURE — 90686 IIV4 VACC NO PRSV 0.5 ML IM: CPT | Performed by: FAMILY MEDICINE

## 2021-10-25 PROCEDURE — 90471 IMMUNIZATION ADMIN: CPT | Performed by: FAMILY MEDICINE

## 2021-10-25 PROCEDURE — 80375 DRUG/SUBSTANCE NOS 1-3: CPT | Performed by: FAMILY MEDICINE

## 2021-10-25 PROCEDURE — 36415 COLL VENOUS BLD VENIPUNCTURE: CPT | Performed by: FAMILY MEDICINE

## 2021-10-25 PROCEDURE — 99213 OFFICE O/P EST LOW 20 MIN: CPT | Mod: 25 | Performed by: FAMILY MEDICINE

## 2021-10-25 PROCEDURE — 82024 ASSAY OF ACTH: CPT | Performed by: FAMILY MEDICINE

## 2021-10-25 PROCEDURE — 96130 PSYCL TST EVAL PHYS/QHP 1ST: CPT | Mod: 95 | Performed by: PSYCHOLOGIST

## 2021-10-25 PROCEDURE — 82627 DEHYDROEPIANDROSTERONE: CPT | Performed by: FAMILY MEDICINE

## 2021-10-25 PROCEDURE — 96131 PSYCL TST EVAL PHYS/QHP EA: CPT | Mod: 95 | Performed by: PSYCHOLOGIST

## 2021-10-25 PROCEDURE — 80050 GENERAL HEALTH PANEL: CPT | Performed by: FAMILY MEDICINE

## 2021-10-25 PROCEDURE — 84439 ASSAY OF FREE THYROXINE: CPT | Performed by: FAMILY MEDICINE

## 2021-10-25 ASSESSMENT — ANXIETY QUESTIONNAIRES
3. WORRYING TOO MUCH ABOUT DIFFERENT THINGS: NEARLY EVERY DAY
6. BECOMING EASILY ANNOYED OR IRRITABLE: NEARLY EVERY DAY
1. FEELING NERVOUS, ANXIOUS, OR ON EDGE: NEARLY EVERY DAY
GAD7 TOTAL SCORE: 21
2. NOT BEING ABLE TO STOP OR CONTROL WORRYING: NEARLY EVERY DAY
7. FEELING AFRAID AS IF SOMETHING AWFUL MIGHT HAPPEN: NEARLY EVERY DAY
5. BEING SO RESTLESS THAT IT IS HARD TO SIT STILL: NEARLY EVERY DAY
IF YOU CHECKED OFF ANY PROBLEMS ON THIS QUESTIONNAIRE, HOW DIFFICULT HAVE THESE PROBLEMS MADE IT FOR YOU TO DO YOUR WORK, TAKE CARE OF THINGS AT HOME, OR GET ALONG WITH OTHER PEOPLE: EXTREMELY DIFFICULT

## 2021-10-25 ASSESSMENT — PATIENT HEALTH QUESTIONNAIRE - PHQ9
5. POOR APPETITE OR OVEREATING: NEARLY EVERY DAY
SUM OF ALL RESPONSES TO PHQ QUESTIONS 1-9: 22

## 2021-10-25 ASSESSMENT — MIFFLIN-ST. JEOR: SCORE: 1392.47

## 2021-10-25 NOTE — Clinical Note
Hello,  I wanted to let you know that I have completed the ADHD assessment with this patient.  As you will see in the report, data do support an ADHD diagnosis.  She will likely be making an appointment with you soon to discuss medication options.  Please let me know if you have any questions or concerns!  Thanks!  Hailey

## 2021-10-25 NOTE — PROGRESS NOTES
Patient Name: Suzy Rodríguez  Date: 2021       Service Type:  Individual (ADHD feedback session)      Session Start Time: 5:00pm  Session End Time:  5:29pm     Session Length: 29 minutes    Session #: 3    Attendees: Patient attended alone    Service Modality: Video Visit:      Provider verified identity through the following two step process.  Patient provided:  Patient photo and Patient     Telemedicine Visit: The patient's condition can be safely assessed and treated via synchronous audio and visual telemedicine encounter.      Reason for Telemedicine Visit: Services only offered telehealth    Originating Site (Patient Location): Patient's home    Distant Site (Provider Location): Provider Remote Setting- Home Office    Consent:  The patient/guardian has verbally consented to: the potential risks and benefits of telemedicine (video visit) versus in person care; bill my insurance or make self-payment for services provided; and responsibility for payment of non-covered services.     Patient would like the video invitation sent by:  Send to e-mail at: [email or web address]    Mode of Communication:  Video Conference via [employer]    As the provider I attest to compliance with applicable laws and regulations related to telemedicine.      PHQ 9/3/2021 2021 10/25/2021   PHQ-9 Total Score 20 23 22   Q9: Thoughts of better off dead/self-harm past 2 weeks Not at all Not at all Not at all   Some encounter information is confidential and restricted. Go to Review Flowsheets activity to see all data.       KIMBERLY-7 SCORE 9/3/2021 2021 10/25/2021   Total Score 16 20 21   Some encounter information is confidential and restricted. Go to Review Flowsheets activity to see all data.         DATA      Progress Since Last Session (Related to Symptoms / Goals / Homework):   Symptoms: Stable.    Homework: Completed.      Treatment Objective(s) Addressed in This  Session:   Provided feedback on ADHD evaluation. Reviewed test results in depth. Plan of care and recommendations were discussed based on testing data. See full report attached on secondary note in this encounter.     Intervention:   Provided feedback to patient regarding testing results, diagnoses, and treatment recommendations. Test results are consistent with an ADHD diagnosis. Symptoms are not better explained by depression and anxiety disorders. Personalized suggestions regarding symptoms were offered. Patient had the opportunity to ask questions; she expressed understanding.        ASSESSMENT: Current Emotional / Mental Status (status of significant symptoms):   Risk status (Self / Other harm or suicidal ideation)   Patient denies current fears or concerns for personal safety.   Patient denies current or recent suicidal ideation or behaviors.   Patient denies current or recent homicidal ideation or behaviors.   Patient denies current or recent self injurious behavior or ideation.   Patient denies other safety concerns.   Patient reports there has been no change in risk factors since their last session.     Patient reports there has been no change in protective factors since their last session.     Recommended that patient call 911 or go to the local ED should there be a change in any of these risk factors.     Appearance:   Appropriate    Eye Contact:   Good    Psychomotor Behavior: Normal    Attitude:   Cooperative    Orientation:   All   Speech    Rate / Production: Normal     Volume:  Normal    Mood:    Normal   Affect:    Appropriate    Thought Content:  Clear    Thought Form:  Coherent  Logical    Insight:    Good      Medication Review:   No changes to current psychiatric medication(s)     Medication Compliance:   Yes     Changes in Health Issues:   None reported     Chemical Use Review:   Substance Use: Chemical use reviewed, no active concerns identified      Tobacco Use: No current tobacco use.       Diagnosis:  1. Attention deficit hyperactivity disorder, combined type, moderate    2. Generalized anxiety disorder    3. Major depressive disorder, recurrent episode, moderate (H)          PLAN:   Recommendations are outlined in full evaluation report (attached to this encounter).   Patient indicated understanding and will contact the clinic if there are further questions.    Report routed to referring provider.    Parts of this documentation may have been completed using dictation software. Potential errors may result and are unintentional.       Hailey Coleman PsyD,   Clinical Psychologist         Psychological Testing Services Summary       Testing Evaluation Services Base: 29120  (first 60 mins) Add-on: 00951  (each addtl 60 mins)   Record Review and Clarify Referral Question   8:40am-8:50am on 9/30/2021 10 minutes   Clinical Decision Making/Battery Modification   7:45am-8:00am on 10/7/2021 15 minutes   Integration/Report Generation   12:00pm-1:00pm on 10/18/2021 ([facility])  1:00pm-1:45pm on 10/18/2021 ([facility])  1:45pm-2:45pm on 10/18/2021 (Final Report)   60 minutes  45 minutes  60 minutes   Interactive Feedback Session   5:00pm-5:29pm on 10/25/2021 29 minutes   Post-Service Work   5:29pm-5:44pm on 10/25/2021 15 minutes   Total Time: 234 minutes   Total Units: 1 3       Diagnoses:   F90.2 Attention-Deficit/Hyperactivity Disorder, combined presentation, moderate  F41.1 Generalized Anxiety Disorder  F33.1 Major Depressive Disorder, recurrent episode, moderate

## 2021-10-25 NOTE — PROGRESS NOTES
Assessment/ Plan     1. Malignant neoplasm of cortex of right adrenal gland (H)  Suzy presents for certification for medical marijuana.  She had been on this in the past through her oncologist, but he has left practice.  She has adrenal carcinoma and just finished chemotherapy.  The medical marijuana was helpful for chronic pain and chronic, intermittent nausea and vomiting.  She meets criteria for medical marijuana so is recertified through the [facility] website.  Her primary care physician, Dr. Ivette Panchal, will continue to prescribe her regular medication.  Her depression and anxiety are not well controlled currently.  Would recommend following up with her primary care physician.    2. Need for prophylactic vaccination and inoculation against influenza  - INFLUENZA VACCINE IM > 6 MONTHS VALENT IIV4 (AFLURIA/FLUZONE)      Subjective:      Suzy Rodríguez is a 36 year old female who presents for certification for medical marijuana.  She has adrenal carcinoma and just finished some chemotherapy.  This is making her quite nauseated.  She also has chronic ongoing leg pain thought either related to the cancer or the chemotherapy.  The gabapentin does help with this.  She has used medical marijuana in the past and had been previously certified by her oncologist.  She has a new oncologist that does not do medical marijuana certifications.  She did find it quite helpful in the past with both the pain and the nausea.  She has no prior history of substance abuse.  She does have a history of anxiety and depression for which she sees her primary care provider.  Neither her anxiety or depression are under very good control so would recommend checking in with her PCP.    Relevant past medical, family, surgical, and social history reviewed with patient, unless noted in HPI, not pertinent for this visit.  Medications were discussed and reconciled.   Review of Systems   A 12 point comprehensive review of systems was negative  except as noted.      Current Outpatient Medications   Medication Sig Dispense Refill     acetaminophen (TYLENOL) 325 MG tablet Take 2 tablets (650 mg) by mouth every 4 hours as needed for other (multimodal surgical pain management along with NSAIDS and opioid medication as indicated based on pain control and physical function.) 150 tablet 0     buPROPion (WELLBUTRIN SR) 150 MG 12 hr tablet Take 150 mg by mouth       calcitRIOL (ROCALTROL) 0.5 MCG capsule        clonazePAM (KLONOPIN) 0.5 MG tablet Take 1 tablet (0.5 mg) by mouth 2 times daily as needed for anxiety 60 tablet 0     FLUoxetine (PROZAC) 20 MG capsule Take 3 capsules (60 mg) by mouth daily 270 capsule 1     gabapentin (NEURONTIN) 300 MG capsule Take 3 capsules (900 mg) by mouth 3 times daily 810 capsule 1     hydrocortisone (CORTEF) 10 MG tablet Take 3 tablets (30 MG) every morning and take 2 tablets (20 MG) by mouth every day at 2 PM. Additional as directed. (Patient taking differently: Take 2 tablets (20 MG) every morning and take 1 tablets (10 MG) by mouth every day at 2 PM. Additional as directed.) 550 tablet 3     levothyroxine (SYNTHROID/LEVOTHROID) 125 MCG tablet Take 1 tablet (125 mcg) by mouth daily (Patient taking differently: Take 125 mcg by mouth daily On Sunday's - 250 mg) 90 tablet 3     medical cannabis (Patient's own supply) See Admin Instructions (The purpose of this order is to document that the patient reports taking medical cannabis.  This is not a prescription, and is not used to certify that the patient has a qualifying medical condition.)       ondansetron (ZOFRAN) 8 MG tablet Take 1 tablet (8 mg) by mouth every 8 hours as needed for nausea 30 tablet 0     prochlorperazine (COMPAZINE) 5 MG tablet Take 1 tablet (5 mg) by mouth every 8 hours as needed for nausea or vomiting 90 tablet 3     temazepam (RESTORIL) 15 MG capsule Take 1 capsule (15 mg) by mouth nightly as needed for sleep 30 capsule 0         Objective:     /85    "Pulse 78   Resp 16   Ht 1.632 m (5' 4.25\")   Wt 71.8 kg (158 lb 6.4 oz)   LMP  (LMP Unknown)   BMI 26.98 kg/m      Body mass index is 26.98 kg/m .       General appearance: alert, appears stated age and cooperative         No results found for this or any previous visit (from the past 168 hour(s)).       This note has been dictated using voice recognition software. Any grammatical or context distortions are unintentional and inherent to the software  "

## 2021-10-25 NOTE — PROGRESS NOTES
Psychological Assessment Report    Patient: Suzy Rodríguez  YOB: 1985  MRN: [id number]  Date(s) of assessment: 9/3/2021 and 10/7/2021  Referral Source: MD Celina [facility]   Reason for Referral: assessing reported deficits in executive functioning     IDENTIFYING INFORMATION AND BRIEF HISTORY OF PRESENTING PROBLEM: Suzy Rodríguez is a 35-year-old White woman who presented to the initial intake appointments on 10/5/2021 and 10/12/2021 due to primary concerns with focus, completing tasks, and distractibility. The patient described that she recently went back to work after being on disability for three years, so these problems have become more apparent to her.      As a child, the patient reported difficulty with organization (having a messy space was a significant source of problems in the household), zoning out in class, needing to borrow school supplies from other students, procrastinated homework, and occasionally did not complete homework.  The patient recalled being removed from mainstream courses but was unable to remember why.  She also indicated having problems with her optical nerves not sending messages correctly to her brain, which was not corrected until years into schooling.  She explained that she did not do well and had difficulty trying hard.  After high school, the patient completed a bachelor's degree and some of a master s degree in social work.  She indicated that it took her 7 years to complete her bachelor's degree because it was difficult to motivate herself, procrastinated homework and took a break in between some courses.  She indicated that she occasionally paid other individuals to write papers for her.  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  She was previously on disability for about 3 years due to a cancer diagnosis.  At work, the patient indicated having problems with  procrastinating paperwork and not documenting thoroughly on some occasions.  She reported that her business card was also suspended because she had difficulty following policy regarding usage.  Specifically, the patient reported experiencing the following symptoms: Making careless mistakes/not being able to pay attention to details (finds shortcuts at work), difficulty sustaining her attention (difficulty cleaning), does not listen when spoken to directly (zones out during conversations, , parents, and friends have reportedly noticed this), does not follow through with tasks (has to do lists and work projects that do not get finished), difficulty organizing (has many piles around, difficulty organizing herself to complete a task), avoids/dislikes tasks that require sustained mental effort (procrastinates at work), loses things often (lost her company credit card to many times resulting in suspension of use), is easily distracted (jumps from task to task), is forgetful (forgets things she needs to do, things other individuals say, client joked that she needs to write everything down), is fidgety, feels the need to get up and move around frequently, talks excessively, blurts things out, and interrupts others.  The patient is seeking diagnostic clarification and updated treatment recommendations.    Mental Health History: The patient reported that she was diagnosed with depression at 13/13 years old and the symptoms continue to be significant.  However, she indicated that many symptoms are related to her cancer diagnosis.  The patient reported that she was diagnosed with anxiety in her 20s, though symptoms likely began in elementary school.  She indicated that the symptoms also continue to be significant.  She is taking her prescribed Prozac and Wellbutrin to help manage symptoms.  She also completed intensive group therapy in 2020. The patient denied a history of depressive symptoms, manic symptoms, attention  difficulties, anxiety symptoms, social anxiety, phobic responses, symptoms of obsessive-compulsive disorder, trauma, and perceptual difficulties. The patient denied issues with sexual compulsivity, gambling, and disordered eating.    Developmental History: The patient reported that she believes that she is the product of a full-term pregnancy and there were no complications during her mother's pregnancy or birth. She reported that it was possible her mother smoked while she was pregnant with her. The patient reported that she believes she met all of her developmental milestones on time. She denied a history of head injuries and learning disorders. The patient explained she remembers being removed from mainstream courses in elementary school, but was unsure why this occurred. The patient recalled academic strengths in math as well as weaknesses in most other areas.     Chemical Dependence/Substance Abuse History: The patient explained she remembers being removed from mainstream courses in elementary school, but was unsure why this occurred.     SOURCES OF DATA/ASSESSMENT: Review of medical and psychiatric records, consideration of behavioral observations during the testing (if applicable), and the results of the psychological tests are all considered in the preparation of this psychological test report. It is important to note that test results comprise a hypothesis of the patient s mental health concerns and are not an independent or conclusive assessment. Test results are combined with the patient s available medical, psychological, behavioral data for an integrated interpretation and report. Due to virtual/remote administration, certain aspects of the assessment process were impacted, such as access to direct patient observation, and maintaining an environment conducive to testing. As such, external factors have the potential to affect the validity of data collected.    TESTS ADMINISTERED:    Larry Adult ADHD  Rating Scale-IV: Self and Other Reports (BAARS-IV)    Larry Functional Impairment Scale: Self and Other Reports (BFIS)    Larry Deficits in Executive Functioning Scale (BDEFS)    Generalized Anxiety Disorder Questionnaire (KIMBERLY-7)    Patient Health Questionnaire- 9 (PHQ-9)    [place] Multiphasic Personality Inventory - 2 (MMPI - 2)     BEHAVIORAL OBSERVATIONS: The patient was pleasant and cooperative throughout all interview and explanation of testing process. The patient was oriented to person, place, and time. Mood was neutral. Eye contact was adequate and speech was at normal rate and rhythm. Motor activity was appropriate. Due to virtual/remote administration, direct patient observation was not possible during the testing process, and it is unknown if the patient was able to maintain an environment conducive to testing. As such, external factors have the potential to affect the validity of data collected.     TEST RESULTS: Test results comprise a hypothesis of the patient s mental health concerns and are not an independent or conclusive assessment. Test results are combined with the patient s available medical, psychological, behavioral, and observational data for an integrated interpretation and report.    Larry Adult ADHD Rating Scale-IV: Self and Other Reports (BAARS-IV)  The BAARS-IV assesses for symptoms of ADHD that are experienced in one's daily life. This assessment measure includes self and collateral rating scales designed to provide information regarding current and childhood symptoms of ADHD including inattention, hyperactivity, and impulsivity. Self-report scores are reported as percentiles. Scores at the 76th-83rd percentile are considered marginal, scores at the 84th-92nd percentile are considered borderline, scores at the 93rd-95th percentile are considered mild, scores at the 96th-98th percentile are considered moderate, and those at the 99th percentile are considered severe. Collateral  "or \"other\" rating scales are reported as number of symptoms observed in comparison to those reported by the client. Norms and percentile scores are not available for collateral reports.     Current Symptoms Scale--Self Report:   Client completed the self-report inventory of current symptoms. The results indicate that the client's Total ADHD Score was 63 which places the patient in the 99+ percentile for overall ADHD symptoms. In addition, the client endorsed 9/9 (99+ percentile) Inattention symptoms, 6/9 (98th percentile) Hyperactivity-Impulsivity symptoms, and 9/9 (99+ percentile) Sluggish Cognitive Tempo symptoms. Client indicated that the reported symptoms have resulted in impaired functioning in school, home, work, and social relationships. Overall, the results suggest the client is experiencing moderate-severe ADHD symptoms.     Current Symptoms Scale--Other Report:  Client's sister completed the collateral report inventory of current symptoms. Based on the collateral contact's observation of symptoms, the client demonstrates 9/9 Inattention symptoms, 2/5 Hyperactivity symptoms, 4/4 Impulsivity symptoms, and 9/9 Sluggish Cognitive Tempo symptoms. The client's Total ADHD Score was 64. The collateral contact indicated the client demonstrates impaired functioning in school, home, work, and social relationships.  The collateral- and self-report scores are not significantly different.    Childhood Symptoms Scale--Self-Report:  Client completed the self-report inventory of childhood symptoms. The results indicate that the client's Total ADHD Score was 64 which places the patient in the 99+ percentile for overall ADHD symptoms in childhood. In addition, the client endorsed 9/9 (99+ percentile) Inattention symptoms and 7/9 (97th percentile) Hyperactivity-Impulsivity symptoms. Client indicated that the reported symptoms resulted in impaired functioning in school, home, and social relationships. Overall, the results " "suggest the client experienced moderate-severe symptoms of ADHD as a child.     Childhood Symptoms Scale--Other Report:  Client's sister completed the collateral report inventory of childhood symptoms. Based on the collateral contact's recollection of client's childhood symptoms, the client demonstrated 8/9 Inattention symptoms and 7/9 Hyperactivity-Impulsivity symptoms. The client's Total ADHD Score was 60. The collateral contact indicated the client demonstrates impaired functioning in school, home, and social relationships. The collateral- and self-report scores are not significantly different.                           Larry Functional Impairment Scale: Self and Other Reports (BFIS)  The BFIS is used to assess an individuals' psychosocial impairment in major life/daily activities that may be due to a mental health disorder. This assessment measure includes self and collateral rating scales. Self-report scores are reported as percentiles. Scores at the 76th-83rd percentile are considered marginal, scores at the 84th-92nd percentile are considered borderline, scores at the 93rd-95th percentile are considered mild, scores at the 96th-98th percentile are considered moderate, and those at the 99th percentile are considered severe. Collateral or \"other\" rating scales are reported as number of symptoms observed in comparison to those reported by the client. Norms and percentile scores are not available for collateral reports.     Results indicate the client identified impairment (scores at or greater than 93rd percentile) in the following areas: home-family, home-chores, work, social-strangers, social-friends, community activities, education, driving, daily responsibilities, self-care routines, and health maintenance. The client's Mean Impairment Score was 7.2 (98th percentile) indicating the client is reporting 78% impairment in functioning across domains. Client's sister completed the collateral rating scale, which " "indicated similar results.      Larry Deficits in Executive Functioning Scale (BDEFS)  The BDEFS is a measure used for evaluating dimensions of adult executive functioning in daily life. This assessment measure includes self and collateral rating scales. Self-report scores are reported as percentiles. Scores at the 76th-83rd percentile are considered marginal, scores at the 84th-92nd percentile are considered borderline, scores at the 93rd-95th percentile are considered mild, scores at the 96th-98th percentile are considered moderate, and those at the 99th percentile are considered severe. Collateral or \"other\" rating scales are reported as number of symptoms observed in comparison to those reported by the client. Norms and percentile scores are not available for collateral reports.     Results indicate the client's Total Executive Functioning Score was 315 (99+ percentile). The ADHD-Executive Functioning Index score was 38 (99+ percentile). These scores suggest the client has severe deficits in executive functioning. Results indicate the client identified significant deficits in the following areas: self-management to time (severe deficits), self-organization/problem-solving (severe deficits), self-restraint (severe deficits), self-motivation (severe deficits) and self-regulation of emotions (moderate deficits). Client's sister completed the collateral rating scale, which indicated similar results.    Generalized Anxiety Disorder Questionnaire (KIMBERLY-7)  This questionnaire is designed to assess for anxiety in adults. Based on the score, the patient is experiencing severe symptoms of anxiety. Client identified the following symptoms of anxiety: feeling on edge/nervous/anxious, difficulty controlling worry, worrying about many different things, trouble relaxing, being restless, becoming easily annoyed or irritable, and feeling something awful might happen.    Patient Health Questionnaire- 9 (PHQ-9)   This " "questionnaire is designed to assess for depression in adults. Based on the score, the patient is experiencing moderate symptoms of depression. Client identified the following symptoms of depression: depressed mood, lack of interest, difficulty with sleep, feeling tired or having little energy, poor appetite or overeating, feeling bad about self, poor concentration, restlessness or lethargy.    [place] Multiphasic Personality Inventory - 2 (MMPI-2)    The MMPI-2 was administered to evaluate current level of emotional distress. Validity profile indicates that the patient appears to have answered in a generally straightforward and consistent manner, and obtained results are considered to be reliable and valid in representing current psychological status. No items were omitted.      Emotional Well-being: At this time, the patient is likely experiencing significant depression and anxiety symptoms.  For her, these likely manifest as feeling blue, low energy, lack of drive, general anxiousness, and irritability.  It is possible that a sense of lack of support at home and somatic difficulties contribute to negative feelings.  Overall, she is likely feeling a sense of unhappiness and emotional discomfort.  She may be feeling as though she has lived an unrewarding life thus far.    Cognitions: The patient is likely struggling with concentration, memory, and attention problems.  She may also struggle with maladaptive rumination.  This tendency, combined with her probable self-critical nature, may be impacting her to have negative cyclical thoughts about the self.    Behaviors: The patient may be inhibited and hesitant at times, behaviors potentially fueled by self-doubt.  She may exhibit stereotypical \"type-A\" traits, such as being impatient with others and being competitive.  She probably has a low tolerance for boredom.    Interpersonal Style: In her relationships, the patient may be shy, insecure, and sensitive; she would " likely describe herself as an introvert.  In dynamics, she is likely passive and submissive.  She may have difficulty forming close relationships.  She may feel misunderstood and isolated at this time.    SUMMARY: Suzy Rodríguez is a 35-year-old White woman who completed psychological testing remotely/virtually due to the COVID-19 pandemic. Testing was requested to provide updated diagnostic clarification and necessary treatment recommendations.    Patient first completed a diagnostic interview in which mental health symptoms, ADHD symptoms, and background information was gathered. Patient self-reported nine symptoms of inattention, five symptoms of hyperactivity, and indicated that her abilities to function effectively at home and work are significantly impaired. Further, her self-reported symptoms on Larry measures of ADHD symptoms were consistent with this information. Both the patient and her sister recalled significant symptoms in childhood.     An objective measure of personality indicated that the patient is likely experiencing significant depression and anxiety symptoms at this time, consistent with self-reported information that she was diagnosed with depression and anxiety years ago. It is likely that her difficulties with attention, concentration, and memory are increasing and/or exacerbated by these mental health symptoms. It is likely that she feels incapable of coping with her problems. Nevertheless, current problems predate mental health problems and chemo toxicity. Therefore, her inattention is better conceptualized as having a neurodevelopmental basis. See recommendations below.       Referral Question Response: DSM-5 criteria for ADHD:   A. Symptom Count - Are there sufficient symptoms for the diagnosis? Yes, patient did endorse sufficient symptoms.   B. Onset - Were several symptoms present before 11 years of age? Yes, a significant number of symptoms reportedly began at an early age.   C.  Pervasiveness - Are several symptoms present in at least two settings? Yes, patient reported that symptoms are problematic at home and work.   D. Impairment - Do symptoms interfere with or reduce the quality of functioning? Yes, patient is unable to complete daily and work tasks effectively.   E. Exclusions - Are symptoms better explained by another disorder or factor? No, symptoms are not better explained by anxiety and depression symptoms or chemo toxicity that occurred in the past three years. Difficulties are explained by an organic basis of inattention.     The patient meets the following DSM-5 criteria for generalized anxiety disorder:  A. Excessive anxiety and worry, occurring more days than not for at least 6 months about a number of events or activities.   B. The individual finds it difficult to control the worry.  C. The anxiety and worry are associated with 3 or more of 6 symptoms.  D. The anxiety, worry, or physical symptoms cause clinically significant distress or impairment in social, occupational, or other important areas of functioning.  E. The disturbance is not attributable to the physiological effects of a substance (e.g., a drug of abuse, a medication) or another medical condition (e.g., hyperthyroidism).  F. The disturbance is not better explained by another mental disorder.    The patient also meets the following DSM-5 criteria for major depressive disorder:  A. Five (or more) symptoms have been present during the same 2-week period and represent a change from previous functioning; at least one of the symptoms is either (1) depressed mood or (2) loss of interest or pleasure.   1. Depressed mood.   2. Diminished interest or pleasure in all, or almost all, activities.   3. Significant appetite change.  4. Significant sleep change.   5. Fatigue or loss of energy.   6. Feelings of worthlessness or inappropriate guilt.   7. Diminished ability to think or concentrate, or indecisiveness.    8. Psychomotor agitation or lethargy.  B. The symptoms cause clinically significant distress or impairment in social, occupational, or other important areas of functioning.  C. The episode is not attributable to the physiological effects of a substance or to another medical condition.  D. The occurrence of major depressive episode is not better explained by other thought / psychotic disorders.  E. There has never been a manic episode or hypomanic episode.     DIAGNOSES:  F90.2 Attention-Deficit/Hyperactivity Disorder, combined presentation, moderate  F41.1 Generalized Anxiety Disorder  F33.1 Major Depressive Disorder, recurrent episode, moderate    PLAN OF CARE:  1. Discuss the following with your primary care provider:  a. Consider a trial of a stimulant medication. This may help alleviate some of the patient s attentional symptoms.     2. Consider initiating individual psychotherapy to help alleviate mood symptoms. Research indicates that outcomes are best with both medication and therapy. You can call the  [facility] line at [phone].     RECOMMENDATIONS:  1. Due to the patient s reported attention, concentration, and mood difficulties, the following health/lifestyle changes when combined, can significantly improve symptoms:   a. Avoid simple carbohydrates at breakfast. Aim for only complex carbohydrates and lean protein for your morning meal.   b. Engage in aerobic exercise 3 times per week for 30 minutes, ensuring that your heart rate stays within your training zone. Further, reading the book,  Spark,  by Jorge Flores M.D can help the patient understand the benefits of exercise on the brain.   c. Research suggest that taking a high-quality multi-vitamin and antioxidant (1/2 cup of blueberries) daily in conjunction with balanced nutrition can be helpful.  d. Aim for the high end of daily water intake: around 72 ounces per day.  e. Ensure regular meals and snacks to maintain optimal  attention.    2. The following may be beneficial in managing some of the patient s attention and concentration difficulties:  a. Due to the patient s difficulties with attention and concentration, consider working in a completely distraction-free area while completing tasks. Workspaces should be completely clear except for the materials needed for the current task. Both visual and auditory distractions should be decreased as much as possible.  b. Considering decreased ability to focus and maintain attention, it is recommended that the patient take frequent breaks while completing tasks. This will help to maintain attention and effort. The patient may benefit from the use of a Nativo Timer. The timer works by using built-in break times. After working on a task consistently for 25 minutes, the timer reminds the user to take a five-minute break before continuing, etc. A Nativo timer can be downloaded as a free isacc to a phone or tablet.  c. Due to the patient s attentional and concentration symptoms, it is recommended to increase organization with the use of lists and calendars. Significantly increasing structure to the day and adhering to a set schedule can increase your ability to complete responsibilities, track deadline, etc. Breaking these tasks down into their component parts and recording them in a calendar/planner will likely be beneficial. Patient would benefit from setting feasible timelines for completion of activities. By establishing clear priorities for completing tasks, you can more likely complete the most important tasks first. The patient may also choose to elect to a friend or family member to help hold them accountable.    3. Avoid multitasking. Attempting to work on multiple tasks and projects the same increases the likelihood that an error will occur. Focus on one task at a time.    4. Due to the patient s difficulties with sleep, it recommended to engage in a relaxing activity up to the point  "of sleep. The patient may want to spend time reading, drawing/ coloring, practicing mindfulness, listening (not watching) to podcasts, music, etc., or try the [employer] isacc, which is free to download and may aid falling asleep more easily.    5. The patient may benefit from engaging in mindfulness practices. This may include breathing techniques, apps that provide guided meditation, or more interactive activities such as coloring.    6. Develop a \"coping skills jar/box.\" This entails designating a certain container to hold slips of paper with distraction technique ideas written on each slip of paper. Distraction techniques may include listening to a certain type of music, playing on game on your phone, doing a breathing exercise, spending time with a pet, calling a certain individual, looking at a magazine, working on a puzzle, etc. When feeling distressed, choose a slip of paper from the container and engage in that activity rather than focusing on the problem.    7. See the attached tip sheet for more ideas as well as online and book resources.       Hailey Coleman PsyD, LP  Clinical Psychologist   "

## 2021-10-25 NOTE — PROGRESS NOTES
TC to pt returning her VM asking for assistance making and appt with Dr. Hernadez. No answer. TC writer to discuss options and that appt on 11/17 is on hold for her at this time if she'd like to take it or that she may opt to establish with Dr. Vasquez. Await response.

## 2021-10-25 NOTE — PATIENT INSTRUCTIONS
Coping with Attention-Deficit/Hyperactivity Disorder (ADHD)    If you have ADHD, it can be hard to sit still, pay attention or control impulsive behavior. ADHD can cause problems at home, at school, at work and in social settings. But you can learn ways to control your symptoms. Below are some ideas for coping with the most common ADHD problems.     Memory, Focus, and Managing Time    Be mindful of time. Use a watch, phone, timer, alarm, PDA or computer--anything that keeps accurate time that you can see and hear at all times. Set more than one alarm to remind you how much time you have left for a task. Give yourself more time than you think you need. For important appointments or deadlines, set reminder alarms hours or days ahead of time.     Use a day planner. A day planner can help you manage time and remember responsibilities. Learning to use a planner is just like learning to use any tool--practice makes perfect.     Use lists. Lists and notes can help you keep track of regular tasks, projects, deadlines and appointments. If you decide to use a daily planner, keep all lists and notes inside of it. Use color codes for tasks so you know which ones are the most important.    Learn to say no and set boundaries. Do not take on too many projects or social engagements. Overbooking yourself can be overwhelming and can lead to missed commitments.    Repeat out loud the instructions you have been given. This will help you remember, as well as let others correct you if needed.    Organization    Create space. Ask yourself what you need on a daily basis. Then, find storage bins or closets for things you don t need every day. Have specific areas for things like keys, bills and other items that are easy to misplace. Throw away or donate things you don t need.     Deal with it now. You can avoid forgetfulness, clutter and procrastination by doing things right away, not some time in the future. This includes filing papers,  cleaning up messes, opening and responding to mail and returning phone calls.    Set up a filing system. Use dividers or separate file folders for different types of documents, such as medical records, receipts and pay stubs. Label and color-code your files so that you can quickly find what you need.     Break larger tasks into small, manageable pieces. Write down the steps needed to complete the task. Follow each step in order until the task is done. If needed, take small, timed breaks and return to finish the task.    Organize your work space. Use lists or planners to organize your day. Remove clutter from your desk. Label any storage bins. Reduce distraction as much as possible. Ideas include sitting facing the wall, closing your door or using a white noise machine.    Sitting Still    Move around as needed. Don t fight the urge to move around. If you need to fidget or stand up for a period of time to help maintain attention, then do so. But take care that you re not interrupting others. You may also find it helpful to squeeze a stress ball.    Be active in a useful way. Exercise can burn off extra energy, relieve stress, boost your mood and calm your mind. Meditation, yoga or jay chi can help you better control your attention and impulses.    Stay healthy. Get enough sleep. Avoid caffeine late in the day. Exercise. Create a relaxing bedtime routine. Stick to a schedule or daily routine. Eat small meals throughout the day. Avoid sugar, eat fewer carbohydrates and eat more protein.     Close Relationships    Talk to your loved ones about ADHD. There are many resources available that can help your loved one understand ADHD. See the Resources section below.    Divide daily tasks based on each person s strengths. For example, if you have trouble with organization, you may not want to do financial planning tasks.    If you have trouble with focus, develop a sign that others in your life can use as a gentle reminder to  pay attention. The sign should be small but meaningful to you and the other person.    Improve communication. Use a dry erase board in a common area to help the whole family stay in touch better. Keep regular to-do lists and compare scheduled activities every day. Writing notes to each other is also very helpful.    Be an active listener and try not to interrupt. If you find your mind wandering when others are talking, mentally repeat their words so you can follow the conversation. Ask questions and ask people to repeat what they said if needed. Pay close attention to body language.     Organize your thoughts. If you need to have a serious conversation, write a list of the points you would like to make or the important ideas you want to talk about. This can help you stay focused and remember what you need to say.    Think before speaking. It can be hard to control your impulses when you feel strongly about something. Before saying whatever pops into your head, stop to ask yourself  Will this be helpful?  or  Will this help me get what I want?     Take charge of your life. Work to accept that some things are harder for you. Make a plan to address these troubles and seek the support you need.    Online Resources    ADD Wareh[employer]chos. http://www.Terracotta.com    ADHD and You.  Tips for Adults with ADHD.  http://www.adhdandyou.com/adhd-patient/adhd-tips-young-adult/    Attention Deficit Disorder Association. http://www.add.org    Attention Deficit Disorder Resources. http://www.addresources.org    Children and Adults with Attention-Deficit/Hyperactivity Disorder (OCHOA). http://www.ochoa.org/    Tiffani Larson.  33 Ways to Get Organized with Adult ADHD.  http://www.additudemag.com/adhd/article/729.html    National Resource Center on ADHD. http://www.xkju6wzdm.org/    Margaret Bello.  Daily Living Tips for Adult ADHD.  http://www.medicinenet.com/living_tips_adult_adhd_pictures_slideshow/article.htm    Danish Lee and  Dimple Gonzalez.  Help for Adult ADD / ADHD.  http://www.helpguide.org/mental/adhd_add_adult_strategies.htm    You can also find many apps for your phone that can help you with common ADHD struggles    Book Resources    Lenard Juarez. ADHD in Adults. (2008).    Lenard Juarez. Taking Charge of Adult ADHD. (2010).    Franklin Isabel and Jorge Flores. Delivered from Distraction. (2006).    Franklin Isabel and Jorge Flores. Driven to Distraction. (2011).    Kriss Mc. ADD in the Workplace. (1997).    Kriss Mc. ADD-Friendly Ways to Organize Your Life. (2002).    Brianna Pavon. The ADHD Effect on Marriage: Understand and Rebuild Your Relationship in Six Steps. (2010).    Benita Sutherland and Frida Duong. You Mean I m Not Lazy, Stupid or Crazy? (2006).    Maikol Dong. Understand Your Brain, Get More Done: The ADHD Executive Functions Workbook. (2012).    Support Groups  ADHD Adult Support Group  [address].  7:00 to 8:30 p.m., last Thursday of each month (except December).  Contact/RSVP: [email or web address]    ADHD Adult Support Group  [address].  Thursday 10:00 a.m. to 12:00 p.m. or 7:00 to 9:30 p.m.  Contact/RSVP: Gomez Lemus. [phone] or [email or web address]     ADHD Adult Support Group for Spouses/Partners of adults with ADHD  [address].  Tuesday 12:00 to 2:00 p.m.   Contact/RSVP: Gomez Lemus. [phone] or [email or web address]

## 2021-10-26 DIAGNOSIS — F33.1 MODERATE EPISODE OF RECURRENT MAJOR DEPRESSIVE DISORDER (H): ICD-10-CM

## 2021-10-26 LAB
ACTH PLAS-MCNC: 13 PG/ML
DHEA-S SERPL-MCNC: <15 UG/DL (ref 35–430)

## 2021-10-26 ASSESSMENT — ANXIETY QUESTIONNAIRES: GAD7 TOTAL SCORE: 21

## 2021-10-29 ENCOUNTER — OFFICE VISIT (OUTPATIENT)
Dept: FAMILY MEDICINE | Facility: CLINIC | Age: 36
End: 2021-10-29
Payer: COMMERCIAL

## 2021-10-29 VITALS
HEART RATE: 82 BPM | BODY MASS INDEX: 26.75 KG/M2 | OXYGEN SATURATION: 98 % | SYSTOLIC BLOOD PRESSURE: 114 MMHG | TEMPERATURE: 98.2 F | RESPIRATION RATE: 20 BRPM | DIASTOLIC BLOOD PRESSURE: 80 MMHG | HEIGHT: 64 IN | WEIGHT: 156.7 LBS

## 2021-10-29 DIAGNOSIS — E03.9 HYPOTHYROIDISM, UNSPECIFIED TYPE: ICD-10-CM

## 2021-10-29 DIAGNOSIS — C74.01 ADRENAL CORTICAL ADENOCARCINOMA OF RIGHT ADRENAL GLAND (H): ICD-10-CM

## 2021-10-29 DIAGNOSIS — C74.01 MALIGNANT NEOPLASM OF CORTEX OF RIGHT ADRENAL GLAND (H): ICD-10-CM

## 2021-10-29 DIAGNOSIS — E27.1 PRIMARY ADRENAL INSUFFICIENCY (H): ICD-10-CM

## 2021-10-29 DIAGNOSIS — Z00.00 ENCOUNTER FOR MEDICARE ANNUAL WELLNESS EXAM: Primary | ICD-10-CM

## 2021-10-29 DIAGNOSIS — F33.1 MAJOR DEPRESSIVE DISORDER, RECURRENT EPISODE, MODERATE WITH ANXIOUS DISTRESS (H): ICD-10-CM

## 2021-10-29 DIAGNOSIS — Z30.431 IUD CHECK UP: ICD-10-CM

## 2021-10-29 DIAGNOSIS — E24.9 CUSHING'S SYNDROME (H): ICD-10-CM

## 2021-10-29 DIAGNOSIS — Z12.4 SCREENING FOR CERVICAL CANCER: ICD-10-CM

## 2021-10-29 DIAGNOSIS — F98.8 ATTENTION DEFICIT DISORDER, UNSPECIFIED HYPERACTIVITY PRESENCE: ICD-10-CM

## 2021-10-29 PROBLEM — C7A.8 OTHER MALIGNANT NEUROENDOCRINE TUMORS (H): Status: RESOLVED | Noted: 2018-07-30 | Resolved: 2021-10-29

## 2021-10-29 PROBLEM — R18.8 OTHER ASCITES: Status: RESOLVED | Noted: 2018-06-30 | Resolved: 2021-10-29

## 2021-10-29 PROBLEM — D64.9 ANEMIA: Status: RESOLVED | Noted: 2018-06-30 | Resolved: 2021-10-29

## 2021-10-29 PROBLEM — F19.982 DRUG-INDUCED INSOMNIA (H): Status: RESOLVED | Noted: 2021-02-24 | Resolved: 2021-10-29

## 2021-10-29 PROBLEM — Z30.430 ENCOUNTER FOR INSERTION OF INTRAUTERINE CONTRACEPTIVE DEVICE (IUD): Status: RESOLVED | Noted: 2019-01-31 | Resolved: 2021-10-29

## 2021-10-29 PROBLEM — R11.2 NAUSEA WITH VOMITING: Status: RESOLVED | Noted: 2018-09-10 | Resolved: 2021-10-29

## 2021-10-29 PROBLEM — F41.9 ANXIETY: Status: RESOLVED | Noted: 2021-09-24 | Resolved: 2021-10-29

## 2021-10-29 PROBLEM — Z63.0 MARITAL RELATIONSHIP PROBLEM: Status: RESOLVED | Noted: 2020-05-05 | Resolved: 2021-10-29

## 2021-10-29 PROBLEM — R53.0 NEOPLASTIC MALIGNANT RELATED FATIGUE: Status: RESOLVED | Noted: 2018-08-01 | Resolved: 2021-10-29

## 2021-10-29 PROBLEM — F32.A DEPRESSION: Status: RESOLVED | Noted: 2019-09-24 | Resolved: 2021-10-29

## 2021-10-29 PROBLEM — E27.8 ADRENAL MASS, RIGHT (H): Status: RESOLVED | Noted: 2018-05-25 | Resolved: 2021-10-29

## 2021-10-29 PROBLEM — E67.0: Status: RESOLVED | Noted: 2017-07-14 | Resolved: 2021-10-29

## 2021-10-29 PROBLEM — R07.9 CHEST PAIN: Status: RESOLVED | Noted: 2018-06-29 | Resolved: 2021-10-29

## 2021-10-29 PROBLEM — D62 POSTOPERATIVE ANEMIA DUE TO ACUTE BLOOD LOSS: Status: RESOLVED | Noted: 2018-06-30 | Resolved: 2021-10-29

## 2021-10-29 PROBLEM — J90 PLEURAL EFFUSION ON RIGHT: Status: RESOLVED | Noted: 2018-06-30 | Resolved: 2021-10-29

## 2021-10-29 PROBLEM — D17.9 LIPOMA: Status: RESOLVED | Noted: 2021-09-01 | Resolved: 2021-10-29

## 2021-10-29 PROCEDURE — 87624 HPV HI-RISK TYP POOLED RSLT: CPT | Performed by: FAMILY MEDICINE

## 2021-10-29 PROCEDURE — G0145 SCR C/V CYTO,THINLAYER,RESCR: HCPCS | Performed by: FAMILY MEDICINE

## 2021-10-29 PROCEDURE — 99214 OFFICE O/P EST MOD 30 MIN: CPT | Mod: 25 | Performed by: FAMILY MEDICINE

## 2021-10-29 PROCEDURE — 99395 PREV VISIT EST AGE 18-39: CPT | Performed by: FAMILY MEDICINE

## 2021-10-29 RX ORDER — DEXTROAMPHETAMINE SACCHARATE, AMPHETAMINE ASPARTATE MONOHYDRATE, DEXTROAMPHETAMINE SULFATE AND AMPHETAMINE SULFATE 2.5; 2.5; 2.5; 2.5 MG/1; MG/1; MG/1; MG/1
10 CAPSULE, EXTENDED RELEASE ORAL DAILY
Qty: 30 CAPSULE | Refills: 0 | Status: SHIPPED | OUTPATIENT
Start: 2021-10-29 | End: 2021-11-08

## 2021-10-29 ASSESSMENT — ENCOUNTER SYMPTOMS
HEADACHES: 1
CONSTIPATION: 0
SORE THROAT: 0
CHILLS: 0
HEMATOCHEZIA: 0
EYE PAIN: 0
PARESTHESIAS: 0
ABDOMINAL PAIN: 0
COUGH: 0
ARTHRALGIAS: 1
NERVOUS/ANXIOUS: 1
HEMATURIA: 0
MYALGIAS: 1
SHORTNESS OF BREATH: 0
WEAKNESS: 0
DYSURIA: 0
HEARTBURN: 0
PALPITATIONS: 0
JOINT SWELLING: 0
FREQUENCY: 1
NAUSEA: 1
DIARRHEA: 0
FEVER: 0
DIZZINESS: 0

## 2021-10-29 ASSESSMENT — PATIENT HEALTH QUESTIONNAIRE - PHQ9
10. IF YOU CHECKED OFF ANY PROBLEMS, HOW DIFFICULT HAVE THESE PROBLEMS MADE IT FOR YOU TO DO YOUR WORK, TAKE CARE OF THINGS AT HOME, OR GET ALONG WITH OTHER PEOPLE: EXTREMELY DIFFICULT
SUM OF ALL RESPONSES TO PHQ QUESTIONS 1-9: 23
SUM OF ALL RESPONSES TO PHQ QUESTIONS 1-9: 23

## 2021-10-29 ASSESSMENT — ACTIVITIES OF DAILY LIVING (ADL): CURRENT_FUNCTION: NO ASSISTANCE NEEDED

## 2021-10-29 ASSESSMENT — MIFFLIN-ST. JEOR: SCORE: 1386.35

## 2021-10-29 NOTE — PROGRESS NOTES
"  Answers for HPI/ROS submitted by the patient on 10/29/2021  If you checked off any problems, how difficult have these problems made it for you to do your work, take care of things at home, or get along with other people?: Extremely difficult  PHQ9 TOTAL SCORE: 23  In general, how would you rate your overall physical health?: fair  Frequency of exercise:: None  Do you usually eat at least 4 servings of fruit and vegetables a day, include whole grains & fiber, and avoid regularly eating high fat or \"junk\" foods? : No  Taking medications regularly:: Yes  Medication side effects:: Muscle aches, Lightheadedness, Other  Activities of Daily Living: no assistance needed  Home safety: no safety concerns identified  Hearing Impairment:: no hearing concerns  In the past 6 months, have you been bothered by leaking of urine?: No  abdominal pain: No  Blood in stool: No  Blood in urine: No  chest pain: No  chills: No  congestion: No  constipation: No  cough: No  diarrhea: No  dizziness: No  ear pain: No  eye pain: No  nervous/anxious: Yes  fever: No  frequency: Yes  genital sores: No  headaches: Yes  hearing loss: No  heartburn: No  arthralgias: Yes  joint swelling: No  peripheral edema: No  mood changes: No  myalgias: Yes  nausea: Yes  dysuria: No  palpitations: No  Skin sensation changes: No  sore throat: No  urgency: No  rash: No  shortness of breath: No  visual disturbance: No  weakness: No  In general, how would you rate your overall mental or emotional health?: poor  Additional concerns today:: Yes    "

## 2021-10-29 NOTE — PATIENT INSTRUCTIONS
Patient Education   Personalized Prevention Plan  You are due for the preventive services outlined below.  Your care team is available to assist you in scheduling these services.  If you have already completed any of these items, please share that information with your care team to update in your medical record.  Health Maintenance Due   Topic Date Due     ANNUAL REVIEW OF HM ORDERS  Never done     Discuss Advance Care Planning  Never done     Pneumococcal Vaccine (1 of 4 - PCV13) Never done     Annual Wellness Visit  Never done     HPV Screening  10/08/2020     PAP Smear  10/08/2020     Your Health Risk Assessment indicates you feel you are not in good health    A healthy lifestyle helps keep the body fit and the mind alert. It helps protect you from disease, helps you fight disease, and helps prevent chronic disease (disease that doesn't go away) from getting worse. This is important as you get older and begin to notice twinges in muscles and joints and a decline in the strength and stamina you once took for granted. A healthy lifestyle includes good healthcare, good nutrition, weight control, recreation, and regular exercise. Avoid harmful substances and do what you can to keep safe. Another part of a healthy lifestyle is stay mentally active and socially involved.    Good healthcare     Have a wellness visit every year.     If you have new symptoms, let us know right away. Don't wait until the next checkup.     Take medicines exactly as prescribed and keep your medicines in a safe place. Tell us if your medicine causes problems.   Healthy diet and weight control     Eat 3 or 4 small, nutritious, low-fat, high-fiber meals a day. Include a variety of fruits, vegetables, and whole-grain foods.     Make sure you get enough calcium in your diet. Calcium, vitamin D, and exercise help prevent osteoporosis (bone thinning).     If you live alone, try eating with others when you can. That way you get a good meal and  have company while you eat it.     Try to keep a healthy weight. If you eat more calories than your body uses for energy, it will be stored as fat and you will gain weight.     Recreation   Recreation is not limited to sports and team events. It includes any activity that provides relaxation, interest, enjoyment, and exercise. Recreation provides an outlet for physical, mental, and social energy. It can give a sense of worth and achievement. It can help you stay healthy.    Mental Exercise and Social Involvement  Mental and emotional health is as important as physical health. Keep in touch with friends and family. Stay as active as possible. Continue to learn and challenge yourself.   Things you can do to stay mentally active are:    Learn something new, like a foreign language or musical instrument.     Play SCRABBLE or do crossword puzzles. If you cannot find people to play these games with you at home, you can play them with others on your computer through the Internet.     Join a games club--anything from card games to chess or checkers or lawn bowling.     Start a new hobby.     Go back to school.     Volunteer.     Read.   Keep up with world events.    Exercise for a Healthier Heart  You may wonder how you can improve the health of your heart. If you re thinking about exercise, you re on the right track. You don t need to become an athlete. But you do need a certain amount of brisk exercise to help strengthen your heart. If you have been diagnosed with a heart condition, your healthcare provider may advise exercise to help stabilize your condition. To help make exercise a habit, choose safe, fun activities.      Exercise with a friend. When activity is fun, you're more likely to stick with it.   Before you start  Check with your healthcare provider before starting an exercise program. This is especially important if you have not been active for a while. It's also important if you have a long-term (chronic)  health problem such as heart disease, diabetes, or obesity. Or if you are at high risk for having these problems.   Why exercise?  Exercising regularly offers many healthy rewards. It can help you do all of the following:     Improve your blood cholesterol level to help prevent further heart trouble    Lower your blood pressure to help prevent a stroke or heart attack    Control diabetes, or reduce your risk of getting this disease    Improve your heart and lung function    Reach and stay at a healthy weight    Make your muscles stronger so you can stay active    Prevent falls and fractures by slowing the loss of bone mass (osteoporosis)    Manage stress better    Reduce your blood pressure    Improve your sense of self and your body image  Exercise tips      Ease into your routine. Set small goals. Then build on them. If you are not sure what your activity level should be, talk with your healthcare provider first before starting an exercise routine.    Exercise on most days. Aim for a total of 150 minutes (2 hours and 30 minutes) or more of moderate-intensity aerobic activity each week. Or 75 minutes (1 hour and 15 minutes) or more of vigorous-intensity aerobic activity each week. Or try for a combination of both. Moderate activity means that you breathe heavier and your heart rate increases but you can still talk. Think about doing 40 minutes of moderate exercise, 3 to 4 times a week. For best results, activity should last for about 40 minutes to lower blood pressure and cholesterol. It's OK to work up to the 40-minute period over time. Examples of moderate-intensity activity are walking 1 mile in 15 minutes. Or doing 30 to 45 minutes of yard work.    Step up your daily activity level.  Along with your exercise program, try being more active the whole day. Walk instead of drive. Or park further away so that you take more steps each day. Do more household tasks or yard work. You may not be able to meet the advised  mount of physical activity. But doing some moderate- or vigorous-intensity aerobic activity can help reduce your risk for heart disease. Your healthcare provider can help you figure out what is best for you.    Choose 1 or more activities you enjoy.  Walking is one of the easiest things you can do. You can also try swimming, riding a bike, dancing, or taking an exercise class.    When to call your healthcare provider  Call your healthcare provider if you have any of these:     Chest pain or feel dizzy or lightheaded    Burning, tightness, pressure, or heaviness in your chest, neck, shoulders, back, or arms    Abnormal shortness of breath    More joint or muscle pain    A very fast or irregular heartbeat (palpitations)  [employer] last reviewed this educational content on 7/1/2019 2000-2021 [employer]. All rights reserved. This information is not intended as a substitute for professional medical care. Always follow your healthcare professional's instructions.          Understanding USDA MyPlate  The USDA has guidelines to help you make healthy food choices. These are called MyPlate. MyPlate shows the food groups that make up healthy meals using the image of a place setting. Before you eat, think about the healthiest choices for what to put on your plate or in your cup or bowl. To learn more about building a healthy plate, visit www.choosemyplate.gov.    The food groups    Fruits. Any fruit or 100% fruit juice counts as part of the Fruit Group. Fruits may be fresh, canned, frozen, or dried, and may be whole, cut-up, or pureed. Make 1/2 of your plate fruits and vegetables.    Vegetables. Any vegetable or 100% vegetable juice counts as a member of the Vegetable Group. Vegetables may be fresh, frozen, canned, or dried. They can be served raw or cooked and may be whole, cut-up, or mashed. Make 1/2 of your plate fruits and vegetables.    Grains. All foods made from grains are part of the Grains Group. These  include wheat, rice, oats, cornmeal, and barley. Grains are often used to make foods such as bread, pasta, oatmeal, cereal, tortillas, and grits. Grains should be no more than 1/4 of your plate. At least half of your grains should be whole grains.    Protein. This group includes meat, poultry, seafood, beans and peas, eggs, processed soy products (such as tofu), nuts (including nut butters), and seeds. Make protein choices no more than 1/4 of your plate. Meat and poultry choices should be lean or low fat.    Dairy. The Dairy Group includes all fluid milk products and foods made from milk that contain calcium, such as yogurt and cheese. (Foods that have little calcium, such as cream, butter, and cream cheese, are not part of this group.) Most dairy choices should be low-fat or fat-free.    Oils. Oils aren't a food group, but they do contain essential nutrients. However it's important to watch your intake of oils. These are fats that are liquid at room temperature. They include canola, corn, olive, soybean, vegetable, and sunflower oil. Foods that are mainly oil include mayonnaise, certain salad dressings, and soft margarines. You likely already get your daily oil allowance from the foods you eat.  Things to limit  Eating healthy also means limiting these things in your diet:       Salt (sodium). Many processed foods have a lot of sodium. To keep sodium intake down, eat fresh vegetables, meats, poultry, and seafood when possible. Purchase low-sodium, reduced-sodium, or no-salt-added food products at the store. And don't add salt to your meals at home. Instead, season them with herbs and spices such as dill, oregano, cumin, and paprika. Or try adding flavor with lemon or lime zest and juice.    Saturated fat. Saturated fats are most often found in animal products such as beef, pork, and chicken. They are often solid at room temperature, such as butter. To reduce your saturated fat intake, choose leaner cuts of meat and  poultry. And try healthier cooking methods such as grilling, broiling, roasting, or baking. For a simple lower-fat swap, use plain nonfat yogurt instead of mayonnaise when making potato salad or macaroni salad.    Added sugars. These are sugars added to foods. They are in foods such as ice cream, candy, soda, fruit drinks, sports drinks, energy drinks, cookies, pastries, jams, and syrups. Cut down on added sugars by sharing sweet treats with a family member or friend. You can also choose fruit for dessert, and drink water or other unsweetened beverages.     [employer] last reviewed this educational content on 6/1/2020 2000-2021 [employer]. All rights reserved. This information is not intended as a substitute for professional medical care. Always follow your healthcare professional's instructions.        Your Health Risk Assessment indicates you feel you are not in good emotional health.    Recreation   Recreation is not limited to sports and team events. It includes any activity that provides relaxation, interest, enjoyment, and exercise. Recreation provides an outlet for physical, mental, and social energy. It can give a sense of worth and achievement. It can help you stay healthy.    Mental Exercise and Social Involvement  Mental and emotional health is as important as physical health. Keep in touch with friends and family. Stay as active as possible. Continue to learn and challenge yourself.   Things you can do to stay mentally active are:    Learn something new, like a foreign language or musical instrument.     Play SCRABBLE or do crossword puzzles. If you cannot find people to play these games with you at home, you can play them with others on your computer through the Internet.     Join a games club--anything from card games to chess or checkers or lawn bowling.     Start a new hobby.     Go back to school.     Volunteer.     Read.   Keep up with world events.

## 2021-10-29 NOTE — PROGRESS NOTES
"   SUBJECTIVE:   CC: Suzy Rodríguez is an 36 year old woman who presents for preventive health visit.       Patient has been advised of split billing requirements and indicates understanding: Yes  Healthy Habits:     In general, how would you rate your overall health?  Fair    Frequency of exercise:  None    Do you usually eat at least 4 servings of fruit and vegetables a day, include whole grains    & fiber and avoid regularly eating high fat or \"junk\" foods?  No    Taking medications regularly:  Yes    Medication side effects:  Muscle aches, Lightheadedness and Other    Ability to successfully perform activities of daily living:  No assistance needed    Home Safety:  No safety concerns identified    Hearing Impairment:  No hearing concerns    In the past 6 months, have you been bothered by leaking of urine?  No    In general, how would you rate your overall mental or emotional health?  Poor      PHQ-2 Total Score: 6    Additional concerns today:  Yes    Ability to successfully perform activities of daily living: Yes, no assistance needed  Home safety:  none identified   Hearing impairment: Yes, Difficulty following a conversation in a noisy restaurant or crowded room.    The patient would like to discuss her recent diagnosis of ADHD today.  She would also like to have her IUD checked.  She had this placed about 2-1/2 years ago for menorrhagia and anemia.  She states that she had not had a.  For over 2 years but had a light to moderate menstrual cycle a week ago.    Today's PHQ-2 Score:   PHQ-2 ( 1999 Pf[employer]) 10/29/2021   Q1: Little interest or pleasure in doing things 3   Q2: Feeling down, depressed or hopeless 3   PHQ-2 Score 6   Q1: Little interest or pleasure in doing things Nearly every day   Q2: Feeling down, depressed or hopeless Nearly every day   PHQ-2 Score 6       Abuse: Current or Past (Physical, Sexual or Emotional) - No  Do you feel safe in your environment? Yes    Have you ever done Advance Care " Planning? (For example, a Health Directive, POLST, or a discussion with a medical provider or your loved ones about your wishes): Yes, advance care planning is on file.    Social History     Tobacco Use     Smoking status: Never Smoker     Smokeless tobacco: Never Used   Substance Use Topics     Alcohol use: Yes     Comment: occ.       Alcohol Use 10/29/2021   Prescreen: >3 drinks/day or >7 drinks/week? No       Reviewed orders with patient.  Reviewed health maintenance and updated orders accordingly - Yes  Labs reviewed in EPIC    Breast Cancer Screening:  Any new diagnosis of family breast, ovarian, or bowel cancer? No    Patient under 40 years of age: Routine Mammogram Screening not recommended.   Pertinent mammograms are reviewed under the imaging tab.    History of abnormal Pap smear: NO - age 30-65 PAP every 5 years with negative HPV co-testing recommended  PAP / HPV 10/8/2015   PAP Negative for squamous intraepithelial lesion or malignancy  Electronically signed by Faby Mccauley CT (ASCP) on 10/21/2015 at  1:55 PM       Reviewed and updated as needed this visit by clinical staff  Tobacco  Allergies    Med Hx  Surg Hx  Fam Hx  Soc Hx        Reviewed and updated as needed this visit by Provider                    Review of Systems   Constitutional: Negative for chills and fever.   HENT: Negative for congestion, ear pain, hearing loss and sore throat.    Eyes: Negative for pain and visual disturbance.   Respiratory: Negative for cough and shortness of breath.    Cardiovascular: Negative for chest pain, palpitations and peripheral edema.   Gastrointestinal: Positive for nausea. Negative for abdominal pain, constipation, diarrhea, heartburn and hematochezia.   Genitourinary: Positive for frequency. Negative for dysuria, genital sores, hematuria and urgency.   Musculoskeletal: Positive for arthralgias and myalgias. Negative for joint swelling.   Skin: Negative for rash.   Neurological: Positive for  "headaches. Negative for dizziness, weakness and paresthesias.   Psychiatric/Behavioral: Negative for mood changes. The patient is nervous/anxious.         OBJECTIVE:   /80 (BP Location: Right arm, Patient Position: Sitting, Cuff Size: Adult Regular)   Pulse 82   Temp 98.2  F (36.8  C) (Tympanic)   Resp 20   Ht 1.634 m (5' 4.35\")   Wt 71.1 kg (156 lb 11.2 oz)   LMP  (LMP Unknown)   SpO2 98%   BMI 26.61 kg/m    Physical Exam    Physical Exam:  General Appearance: Alert, cooperative, no distress, appears stated age   Head: Normocephalic, without obvious abnormality, atraumatic  Eyes: PERRL, conjunctiva/corneas clear, EOM's intact   Ears: Normal TM's and external ear canals, both ears  Neck: Supple, symmetrical, trachea midline, no adenopathy;  thyroid: not enlarged, symmetric, no tenderness/mass/nodules  Back: Symmetric, no curvature, ROM normal,  Lungs: Clear to auscultation bilaterally, respirations unlabored  Breasts: No breast masses, tenderness, asymmetry, or nipple discharge.  Heart: Regular rate and rhythm, S1 and S2 normal, no murmur, rub, or gallop  Abdomen: Soft, non-tender, bowel sounds active all four quadrants,  no masses, no organomegaly  Pelvic:normal external female genitalia, normal appearing vaginal mucosa and cervix  Extremities: Extremities normal, atraumatic, no cyanosis or edema  Skin: Skin color, texture, turgor normal, no rashes or lesions  Lymph nodes: Cervical, supraclavicular, and axillary nodes normal and   Neurologic: Normal      Diagnostic Test Results:  Labs reviewed in Epic    ASSESSMENT/PLAN:       ICD-10-CM    1. Encounter for Medicare annual wellness exam  Z00.00 Pap screen with HPV - recommended age 30 - 65 years   2. Screening for cervical cancer  Z12.4 Pap screen with HPV - recommended age 30 - 65 years   3. IUD check up  Z30.431 IUD in appropriate position -I recommended that she monitor her bleeding.  If it becomes worse or occurs with every menstrual cycle we could " "consider replacing her IUD a bit early to help with her menorrhagia.   4. Attention deficit disorder, unspecified hyperactivity presence  F98.8  review of psychology note was done.  With her diagnosis of ADHD a low-dose of extended release Adderall was sent to the pharmacy.  Discussed risks and benefits of the medication.  We discussed that the medication will likely help but she may benefit from seeing a therapist for lifestyle modifications.  She will consider this.  Amphetamine-dextroamphetamine (ADDERALL XR) 10 MG 24 hr capsule   5. 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 Primary adrenal insufficiency (H)  E27.1  continue to follow with endocrinology and oncology   7. 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 Cushing's syndrome (H)  E24.9  continue to follow with endocrinology and oncology   9. Hypothyroidism, unspecified type  E03.9  continue to follow with endocrinology and oncology   10. Major depressive disorder, recurrent episode, moderate with anxious distress (H)  F33.1  patient feels as though she is fairly stable.  She is hopeful that once the Adderall starts working this will improve as well.       Patient has been advised of split billing requirements and indicates understanding: Yes  COUNSELING:  Reviewed preventive health counseling, as reflected in patient instructions    Estimated body mass index is 26.61 kg/m  as calculated from the following:    Height as of this encounter: 1.634 m (5' 4.35\").    Weight as of this encounter: 71.1 kg (156 lb 11.2 oz).    Weight management plan: Discussed healthy diet and exercise guidelines    She reports that she has never smoked. She has never used smokeless tobacco.      Counseling Resources:  ATP IV Guidelines  Pooled Cohorts Equation Calculator  Breast Cancer Risk Calculator  BRCA-Related Cancer " Risk Assessment: FHS-7 Tool  FRAX Risk Assessment  ICSI Preventive Guidelines  Dietary Guidelines for Americans, 2010  USDA's MyPlate  ASA Prophylaxis  Lung CA Screening    Ivette Leal MD  [facility]  Answers for HPI/ROS submitted by the patient on 10/29/2021  If you checked off any problems, how difficult have these problems made it for you to do your work, take care of things at home, or get along with other people?: Extremely difficult  PHQ9 TOTAL SCORE: 23

## 2021-10-30 ASSESSMENT — PATIENT HEALTH QUESTIONNAIRE - PHQ9: SUM OF ALL RESPONSES TO PHQ QUESTIONS 1-9: 23

## 2021-11-02 LAB
BKR LAB AP GYN ADEQUACY: NORMAL
BKR LAB AP GYN INTERPRETATION: NORMAL
BKR LAB AP HPV REFLEX: NORMAL
BKR LAB AP PREVIOUS ABNORMAL: NORMAL
PATH REPORT.COMMENTS IMP SPEC: NORMAL
PATH REPORT.RELEVANT HX SPEC: NORMAL

## 2021-11-03 LAB — MISCELLANEOUS TEST 1 (ARUP): NORMAL

## 2021-11-04 LAB
HUMAN PAPILLOMA VIRUS 16 DNA: NEGATIVE
HUMAN PAPILLOMA VIRUS 18 DNA: NEGATIVE
HUMAN PAPILLOMA VIRUS FINAL DIAGNOSIS: NORMAL
HUMAN PAPILLOMA VIRUS OTHER HR: NEGATIVE

## 2021-11-11 DIAGNOSIS — F33.1 MAJOR DEPRESSIVE DISORDER, RECURRENT EPISODE, MODERATE WITH ANXIOUS DISTRESS (H): Primary | ICD-10-CM

## 2021-11-11 RX ORDER — BUPROPION HYDROCHLORIDE 150 MG/1
TABLET, EXTENDED RELEASE ORAL
Qty: 180 TABLET | Refills: 1 | Status: SHIPPED | OUTPATIENT
Start: 2021-11-11 | End: 2022-05-11

## 2021-11-11 NOTE — TELEPHONE ENCOUNTER
Routing refill request to provider for review/approval because:  Medication is reported/historical    Juanito King RN

## 2021-11-15 ENCOUNTER — ANCILLARY PROCEDURE (OUTPATIENT)
Dept: CT IMAGING | Facility: CLINIC | Age: 36
End: 2021-11-15
Attending: INTERNAL MEDICINE
Payer: COMMERCIAL

## 2021-11-15 DIAGNOSIS — C74.91 MALIGNANT NEOPLASM OF ADRENAL GLAND, RIGHT (H): ICD-10-CM

## 2021-11-15 PROCEDURE — 74177 CT ABD & PELVIS W/CONTRAST: CPT | Performed by: STUDENT IN AN ORGANIZED HEALTH CARE EDUCATION/TRAINING PROGRAM

## 2021-11-15 PROCEDURE — 71260 CT THORAX DX C+: CPT | Performed by: STUDENT IN AN ORGANIZED HEALTH CARE EDUCATION/TRAINING PROGRAM

## 2021-11-15 RX ORDER — IOPAMIDOL 755 MG/ML
96 INJECTION, SOLUTION INTRAVASCULAR ONCE
Status: COMPLETED | OUTPATIENT
Start: 2021-11-15 | End: 2021-11-15

## 2021-11-15 RX ADMIN — IOPAMIDOL 96 ML: 755 INJECTION, SOLUTION INTRAVASCULAR at 11:17

## 2021-11-16 ENCOUNTER — PATIENT OUTREACH (OUTPATIENT)
Dept: ONCOLOGY | Facility: CLINIC | Age: 36
End: 2021-11-16
Payer: COMMERCIAL

## 2021-11-17 ENCOUNTER — VIRTUAL VISIT (OUTPATIENT)
Dept: ONCOLOGY | Facility: CLINIC | Age: 36
End: 2021-11-17
Attending: INTERNAL MEDICINE
Payer: COMMERCIAL

## 2021-11-17 DIAGNOSIS — S09.90XA HEAD TRAUMA, INITIAL ENCOUNTER: Primary | ICD-10-CM

## 2021-11-17 DIAGNOSIS — F41.9 ANXIETY: ICD-10-CM

## 2021-11-17 DIAGNOSIS — R51.9 ACUTE NONINTRACTABLE HEADACHE, UNSPECIFIED HEADACHE TYPE: ICD-10-CM

## 2021-11-17 DIAGNOSIS — F51.01 PRIMARY INSOMNIA: ICD-10-CM

## 2021-11-17 DIAGNOSIS — C74.01 MALIGNANT NEOPLASM OF CORTEX OF RIGHT ADRENAL GLAND (H): ICD-10-CM

## 2021-11-17 PROCEDURE — G0463 HOSPITAL OUTPT CLINIC VISIT: HCPCS | Mod: PN,RTG | Performed by: INTERNAL MEDICINE

## 2021-11-17 PROCEDURE — 999N001193 HC VIDEO/TELEPHONE VISIT; NO CHARGE

## 2021-11-17 PROCEDURE — 99214 OFFICE O/P EST MOD 30 MIN: CPT | Mod: 95 | Performed by: INTERNAL MEDICINE

## 2021-11-17 RX ORDER — GABAPENTIN 300 MG/1
900 CAPSULE ORAL 3 TIMES DAILY
Qty: 810 CAPSULE | Refills: 1 | Status: SHIPPED | OUTPATIENT
Start: 2021-11-17 | End: 2022-09-14

## 2021-11-17 RX ORDER — TEMAZEPAM 15 MG/1
15-30 CAPSULE ORAL
Qty: 60 CAPSULE | Refills: 0 | Status: SHIPPED | OUTPATIENT
Start: 2021-11-17 | End: 2022-01-06

## 2021-11-17 NOTE — PROGRESS NOTES
"  [facility] Oncology Followup  Nov 17, 2021     REFERRING PROVIDER: Warren Mansfield MD from [facility] Urologic Oncology clinic.      REASON FOR CURRENT VISIT: Follow-up for adrenocortical carcinoma,    ONCOLOGIC HISTORY:  1. 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   - CT abdomen and pelvis stone protocol without contrast 5 8014 showed \"9.2 x 8 cm heterogeneous right upper quadrant mass with small foci of calcification within it which is likely arising from the adrenal gland though inseparable from liver and upper pole of right kidney. It is incompletely evaluated on this noncontrast study. 3 x 2.6 cm mass right lobe of liver on image #85 also incompletely evaluated. 6 x 4 x 4 mm upper third left ureteral obstructing stone with mild to moderate secondary hydronephrosis. Collection of stones at lower pole left kidney extending over an area of approximately 6 x 7 x 4 mm. Additional nonobstructing 1 mm stone upper pole left kidney. 2 mm nonobstructing stone noted upper pole right kidney with no hydronephrosis on the right. Postop change consistent with gastric bypass. Noncontrast images of spleen, left adrenal gland, gallbladder, and pancreas unremarkable. No aneurysm. No adenopathy.\"  - Seen by Dr. Delvalle at [facility] Urology clinic. Referred to Dr. Alvino Patel and then Dr. Warren Mansfield.  - Endocrinology team directed workup showed high DHEAS level of 740 pre-surgery.   - Right open adrenalectomy and hepatic mobilization performed by Dr. Warren Mansfield on 6/25/2018. Pathology showed adrenocortical carcinoma measuring 11.3 cm, weighing 413 g with extension into or through the adrenal capsule negative lymphovascular invasion, tumor necrosis present, margins involved by tumor, no regional lymph nodes identified, " pathologic stage T2 NX.  pathology review reveals low grade ACC.  - DHEAS normalized postsurgery.   - Adjuvant Mitotane started on 7/24/18. Dose increased to 2000mg TID around 10/23/18 due to persistently low troughs. Held 1/16/19 for two weeks and resumed 1/30 at 1000 mg po BID due to very poor tolerance of higher doses. Highest mitotane level was 10.2 on 2/11/19. Mitotane troughs did not rise above 10 despite increase in dose to 1500 BID and then 1500+2000. Started 2000mg BID on 10/28/20. Increased to [phone] since around Christmas 2020.   - Saw radiation oncology at [facility], radiation oncology at [facility], as well as endocrine oncology (Dr. Huertas) at [facility].   - S/p adjuvant RT by Dr. Monsivais - 5040 cGy over 30 fr (8/24/18-10/6/18).  - 2/11/19: OSH CT C/A/P and MRI brain with contrast - no evidence of disease recurrence.   - 06/08/20, 09/14/20, 12/7/20, 3/16/2021: CT C/A/P with contrast - AFUA.    INTERVAL HISTORY:  Ms. Rodríguez is a 36 year old woman with history notable for right-sided functioning adrenocortical carcinoma (diagnosed in May 2018), who was  on adjuvant mitotane until July 2021.     She is being followed over a video visit.    She had been on mitotane [phone] since December 2020.  Since the last visit she has gone off the mitotane and doing well. She is on Adrenal replacement and stable.  She follows Dr. Huertas at [facility].      She is being seen with labs and restaging scans for a scheduled follow-up visit.    REVIEW OF SYSTEMS: 14 point ROS negative other than the symptoms noted above in the HPI.    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 Mansfield.     Adrenal mass (H)      Anemia     thought due to heavy menses.     Calculus of ureter 5/9/2018     Carcinoma, adrenal cortical (H) 7/18/2018     Chocolate cyst of ovary 2011      "Clotting disorder (H)      Depression      GERD (gastroesophageal reflux disease)      History of miscarriage      Hypertension due to endocrine disorder 2018     Kidney stones     passed on their own     Malignant neoplasm of cortex of right adrenal gland (H) 2018    EOTD; 19.  Check in May. SCP started.  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     SHREYA on CPAP 2015    Stopped using CPAP the  after weight loss as even at lower pressure/adjustment couldn't tolerate the cpap. Resting well, cautioned to watch for any signs of fatigue and consider \"titration study\" in the future to see if cpap is still required at her new weight.     Pneumonia     hx of recurrent pneumonia issues/respiratory infections.     Polycystic ovary syndrome     able to get pregnant, not on meds.     Pulmonary embolism (H)  or so    briefly on wafarin.     Sleep apnea     cpap     Vitamin D deficiency    MHTFR mutation with h/o mutiple miscarriages.    PAST SURGICAL HISTORY:   Past Surgical History:   Procedure Laterality Date     ADRENALECTOMY Right 2018    Procedure: ADRENALECTOMY;  Right Adrenalectomy,  Embolize Liver , Anesthesia Block;  Surgeon: Warren Mansfield MD;  Location: [facility] OR     ADRENALECTOMY       ADRENALECTOMY Right       SECTION      twice      SECTION      2      SECTION      x2     EMBOLECTOMY ABDOMEN N/A 2018    Procedure: EMBOLECTOMY ABDOMEN;;  Surgeon: Ector Mcintyre MD;  Location: [facility] OR     " EXTRACORPOREAL SHOCK WAVE LITHOTRIPSY (ESWL)       GASTRIC BYPASS  2016     VT CYSTO/URETERO W/LITHOTRIPSY &INDWELL STENT INSRT Left 5/11/2018    Procedure: CYSTOSCOPY, LEFT URETEROSCOPY LASER LITHOTRIPSY STENT INSERTION;  Surgeon: Skyler Delvalle MD;  Location: [facility];  Service: Urology     VT LAP GASTRIC BYPASS/DERICK-EN-Y N/A 6/8/2016    Procedure: GASTRIC BYPASS LAPAROSCOPIC DERICK-EN-Y;  Surgeon: Randy Sutherland MD;  Location: [facility];  Service: General     TUBAL LIGATION       WISDOM TOOTH EXTRACTION Bilateral       SOCIAL HISTORY:   Social History     Tobacco Use     Smoking status: Never Smoker     Smokeless tobacco: Never Used   Vaping Use     Vaping Use: Never used   Substance Use Topics     Alcohol use: Yes     Comment: occ.     Drug use: No     FAMILY HISTORY:   Family History   Problem Relation Age of Onset     Depression Paternal Grandmother      Diabetes Mother      Diabetes Father      Hypertension Father      Chronic Obstructive Pulmonary Disease Father      Cancer Maternal Grandmother         gastric     Urolithiasis Maternal Grandmother      Heart Disease Maternal Grandfather      Cancer Maternal Grandfather         lung     Heart Disease Paternal Grandfather      Breast Cancer No family hx of      Colon Cancer No family hx of      Prostate Cancer No family hx of      Cerebrovascular Disease No family hx of      Clotting Disorder No family hx of      Gout No family hx of      ALLERGIES:   Allergies   Allergen Reactions     Bee Venom Swelling     Ibuprofen Hives and Swelling     CURRENT MEDICATIONS:   Current Outpatient Medications   Medication Instructions     acetaminophen (TYLENOL) 650 mg, Oral, EVERY 4 HOURS PRN     buPROPion (WELLBUTRIN SR) 150 mg, Oral     calcitRIOL (ROCALTROL) 0.5 MCG capsule No dose, route, or frequency recorded.     calcium carbonate 500 mg (elemental) (OSCAL) 500 mg, Oral, 2 TIMES DAILY     clonazePAM (KLONOPIN) 0.5 mg, Oral, 2 TIMES DAILY PRN      diazepam (VALIUM) 5 mg, Oral, ONCE PRN     fludrocortisone (FLORINEF) 0.2 mg, Oral, DAILY     FLUoxetine (PROZAC) 60 mg, Oral, DAILY     gabapentin (NEURONTIN) 900 mg, Oral, 3 TIMES DAILY     hydrocortisone (CORTEF) 10 MG tablet Take 3 tablets (30 MG) every morning and take 2 tablets (20 MG) by mouth every day at 2 PM. Additional as directed.     levothyroxine (SYNTHROID/LEVOTHROID) 125 mcg, Oral, DAILY     ondansetron (ZOFRAN) 8 mg, Oral, EVERY 8 HOURS PRN     prochlorperazine (COMPAZINE) 5 mg, Oral, EVERY 8 HOURS PRN     temazepam (RESTORIL) 15 mg, Oral, AT BEDTIME PRN     UNABLE TO FIND Other, medical cannabis (Patient's own supply. Not a prescription)       PHYSICAL EXAMINATION:  Vital signs: LMP  (LMP Unknown)    Gen: NAD, on video no distress  No other exam    Recent Labs   Lab Test 10/25/21  0919 09/03/21  0806 08/10/21  1051 08/08/21  0050 07/05/21  0741    140 138 144 141   POTASSIUM 4.3 3.9 3.4 4.2 3.4   CHLORIDE 104 107 103 108* 110*   CO2 23 28 28 26 28   ANIONGAP 12 5 7 10 4   BUN 9 9 10 13 12   CR 0.75 0.79 0.79 0.69 0.83   GLC 84 92 73 72 73   SHASHA 8.7 7.9* 8.3* 8.5 7.9*     No results for input(s): MAG, PHOS in the last 54073 hours.  Recent Labs   Lab Test 10/25/21  0919 09/03/21  0806 08/10/21  1051 08/07/21  2244 07/05/21  0741 06/04/21  1205 05/10/21  1721   WBC 4.2 5.9 4.8 8.0 4.2 3.9* 3.8*   HGB 14.1 14.1 15.1 14.0 15.0 13.2 14.3    148* 110* 96* 107* 149* 200   MCV 94 95 97 97 97 96 95   NEUTROPHIL 68  --  69  --  46.3 64.5 60.2     Recent Labs   Lab Test 10/25/21  0919 09/03/21  0806 08/10/21  1051   BILITOTAL 0.3 0.2 0.3   ALKPHOS 103 97 90   ALT 17 17 15   AST 22 18 16   ALBUMIN 3.5 3.0* 3.3*     TSH   Date Value Ref Range Status   10/25/2021 0.35 0.30 - 5.00 uIU/mL Final   09/03/2021 0.28 (L) 0.40 - 4.00 mU/L Final   08/10/2021 0.44 0.40 - 4.00 mU/L Final   07/05/2021 0.22 (L) 0.40 - 4.00 mU/L Final   06/04/2021 0.44 0.40 - 4.00 mU/L Final   05/10/2021 0.18 (L) 0.40 - 4.00 mU/L  Final     No results for input(s): CEA in the last 62802 hours.  Results for orders placed or performed in visit on 11/15/21   CT Chest/Abdomen/Pelvis w Contrast    Narrative    EXAMINATION: CT CHEST/ABDOMEN/PELVIS W CONTRAST, 11/15/2021 11:26 AM    TECHNIQUE: Helical CT images from the thoracic inlet through the  symphysis pubis were obtained with intravenous contrast. Contrast  dose: Isovue 370  96 mls    COMPARISON: CT 2521, 6/4/2021    HISTORY: Malignant neoplasm of adrenal gland, right (H) status post  right adrenalectomy and hepatic mobilization on 6/25/2018 with  subsequent chemoradiation therapy.    FINDINGS:    Chest: Thyroid gland is unremarkable. No supraclavicular or axillary  lymphadenopathy. No mediastinal lymphadenopathy. Cardiac size is  normal. No pericardial effusion. Aortic and pulmonary artery caliber  within normal limits. Visualized portions of the aortic arch branching  vessels are unremarkable. Esophagus is unremarkable.    Trachea and bronchi are patent and clear of debris. No evidence of  airspace disease. Scattered tiny sub-4mm pulmonary nodules. No  pneumothorax. No pleural effusion.    Abdomen and pelvis: Stable hepatic segment 5 hemangioma. No additional  hepatic lesions. Gallbladder is unremarkable. No intra- or  extrahepatic biliary dilation. Spleen and pancreas are unremarkable.  Postoperative changes of right adrenalectomy. No abnormal soft tissue  thickening along the surgical bed. The left adrenal gland is  unremarkable. Cortical hypoattenuation involving the right upper renal  pole, presumably posttreatment changes. Otherwise, the kidneys are  unremarkable without suspicious solid lesions. No hydronephrosis or  nephrolithiasis. Ureters and urinary bladder are unremarkable. Uterus  with indwelling IUD is unremarkable. Left-sided corpus luteal cysts.    Postoperative changes of Tanika-en-Y gastric bypass. No evidence of  bowel obstruction. Colon is unremarkable. Normal caliber  appendix.    No suspicious abdominal or pelvic lymphadenopathy. No free fluid. No  pneumoperitoneum.    Abdominal aorta is normal in caliber and patent. Celiac and mesenteric  artery origins are unremarkable. Portal veins and IVC are patent.    Bones and soft tissues: Suspicious osseous lesions. Soft tissue is  unremarkable.      Impression    IMPRESSION:   1a. Postoperative changes of right adrenalectomy. No evidence of local  recurrence or metastatic disease in the chest, abdomen, or pelvis.  1b. Stable presumed posttreatment changes in the right upper kidney.  Recommend continued attention on follow-up.  2. Stable right hepatic hemangioma.    I have personally reviewed the examination and initial interpretation  and I agree with the findings.    FRANKI EVANS MD         SYSTEM ID:  [id number]     *Note: Due to a large number of results and/or encounters for the requested time period, some results have not been displayed. A complete set of results can be found in Results Review.         IMAGING DATA:  8/25/21                                                     IMPRESSION:  1.  No evidence of recurrent or metastatic disease in the chest, abdomen or pelvis.  2.  Stable cortical thinning and hypoenhancement of the upper pole right kidney which most likely represents posttreatment change.    ASSESSMENT AND PLAN: A 36 year old  female with right-sided functioning high-risk adrenocortical carcinoma.     Adrenocortical carcinoma:  - Started on adjuvant mitotane in July 2018 based on her presentation and tumor characteristics including invasion of the adrenal capsule, high mitotic rate, possibly positive margins, and high Ki67, which could result in a rate of recurrence as high as 40%.    The mitotane was stopped by Dr. Hilton and endocrinologic oncologist from [facility].  She had a level drawn on 5/10/2021, 6 days after discontinuation of mitotane with level of 10.5.  She informs that Dr. Liang would like to  continue with mitotane levels every month and interval imaging every 3 months from now .  --We will continue with scans in three months for now and they will be spaced out to every four months some time next year.  --will continue to follow with Dr Huertas ([facility]).    We will continue to hydrocortisone at 15/10.  She continues to see declines in the mitotane serum levels off therapy.    She is weaning from Florinef and bPs are ok.   Mild neuropathy pain,      She had a fall and hit her head. She did not get evaluation done for this. I have recommended that we get brain MRI since she is noticing headaches. She would like to defer brain MRI at this time.     Addendum: She noticed return of headaches, dizziness, difficulty with word finding, fatigue and stiff neck while working on her computer for work. We did get an urgent/state brain MRI to evaluate this further which has been normal.      Video-Visit Details    Type of service:  Video Visit  Originating Location (pt. Location): Home  Distant Location (provider location):  [facility]   Platform used for Video Visit: [employer]    25 minutes spent on the date of the encounter doing chart review, history and exam, documentation and further activities as noted above      Tru Hernadez    Hematologist and Medical Oncologist  [facility]

## 2021-11-17 NOTE — PROGRESS NOTES
Suzy is a 36 year old who is being evaluated via a billable video visit.      How would you like to obtain your AVS? [employer]  If the video visit is dropped, the invitation should be resent by: Text to cell phone: [phone]  Will anyone else be joining your video visit? No

## 2021-11-17 NOTE — LETTER
"    11/17/2021         RE: Suzy Rodríguez  [address]  [facility] [address]        Dear Colleague,    Thank you for referring your patient, Suzy Rodríguez, to the [facility]. Please see a copy of my visit note below.      [facility] Oncology Followup  Nov 17, 2021     REFERRING PROVIDER: Warren Mansfield MD from [facility] Urologic Oncology clinic.      REASON FOR CURRENT VISIT: Follow-up for adrenocortical carcinoma,    ONCOLOGIC HISTORY:  1. 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   - CT abdomen and pelvis stone protocol without contrast 5 1406 showed \"9.2 x 8 cm heterogeneous right upper quadrant mass with small foci of calcification within it which is likely arising from the adrenal gland though inseparable from liver and upper pole of right kidney. It is incompletely evaluated on this noncontrast study. 3 x 2.6 cm mass right lobe of liver on image #85 also incompletely evaluated. 6 x 4 x 4 mm upper third left ureteral obstructing stone with mild to moderate secondary hydronephrosis. Collection of stones at lower pole left kidney extending over an area of approximately 6 x 7 x 4 mm. Additional nonobstructing 1 mm stone upper pole left kidney. 2 mm nonobstructing stone noted upper pole right kidney with no hydronephrosis on the right. Postop change consistent with gastric bypass. Noncontrast images of spleen, left adrenal gland, gallbladder, and pancreas unremarkable. No aneurysm. No adenopathy.\"  - Seen by Dr. Delvalle at [facility] Urology clinic. Referred to Dr. Alvino Patel and then Dr. Warren Mansfield.  - Endocrinology team directed workup showed high DHEAS level of 740 pre-surgery.   - Right open adrenalectomy and hepatic mobilization performed by Dr. Warren Mansfield on 6/25/2018. " Pathology showed adrenocortical carcinoma measuring 11.3 cm, weighing 413 g with extension into or through the adrenal capsule negative lymphovascular invasion, tumor necrosis present, margins involved by tumor, no regional lymph nodes identified, pathologic stage T2 NX.  pathology review reveals low grade ACC.  - DHEAS normalized postsurgery.   - Adjuvant Mitotane started on 7/24/18. Dose increased to 2000mg TID around 10/23/18 due to persistently low troughs. Held 1/16/19 for two weeks and resumed 1/30 at 1000 mg po BID due to very poor tolerance of higher doses. Highest mitotane level was 10.2 on 2/11/19. Mitotane troughs did not rise above 10 despite increase in dose to 1500 BID and then 1500+2000. Started 2000mg BID on 10/28/20. Increased to [id number] since around Christmas 2020.   - Saw radiation oncology at [facility], radiation oncology at [facility], as well as endocrine oncology (Dr. Huertas) at [facility].   - S/p adjuvant RT by Dr. Monsivais - 5040 cGy over 30 fr (8/24/18-10/6/18).  - 2/11/19: OSH CT C/A/P and MRI brain with contrast - no evidence of disease recurrence.   - 06/08/20, 09/14/20, 12/7/20, 3/16/2021: CT C/A/P with contrast - AFUA.    INTERVAL HISTORY:  Ms. Rodríguez is a 36 year old woman with history notable for right-sided functioning adrenocortical carcinoma (diagnosed in May 2018), who was  on adjuvant mitotane until July 2021.     She is being followed over a video visit.    She had been on mitotane [id number] since December 2020.  Since the last visit she has gone off the mitotane and doing well. She is on Adrenal replacement and stable.  She follows Dr. Huertas at [facility].      She is being seen with labs and restaging scans for a scheduled follow-up visit.    REVIEW OF SYSTEMS: 14 point ROS negative other than the symptoms noted above in the HPI.    PAST MEDICAL HISTORY:  Past Medical History:   Diagnosis Date     Adrenal cortex  "cancer, right (H) 6/30/2018    Resected 6/25/18, Dr. Mansfield.     Adrenal mass (H)      Anemia     thought due to heavy menses.     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  Check in May. SCP started.  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     SHREYA on CPAP 12/17/2015    Stopped using CPAP the Fall of 2016 after weight loss as even at lower pressure/adjustment couldn't tolerate the cpap. Resting well, cautioned to watch for any signs of fatigue and consider \"titration study\" in the future to see if cpap is still required at her new weight.     Pneumonia     hx of recurrent pneumonia issues/respiratory infections.     Polycystic ovary syndrome     able to get pregnant, not on meds.     Pulmonary embolism (H) 2009 or so    briefly on wafarin.     Sleep apnea     cpap     Vitamin D deficiency    MHTFR mutation with h/o mutiple miscarriages.    PAST SURGICAL HISTORY:   Past Surgical History:   Procedure Laterality Date     ADRENALECTOMY Right 6/25/2018    Procedure: ADRENALECTOMY;  Right Adrenalectomy,  Embolize Liver , Anesthesia Block;  Surgeon: Warren Mansfield MD;  Location: [facility] OR     ADRENALECTOMY       " ADRENALECTOMY Right       SECTION      twice      SECTION      2      SECTION      x2     EMBOLECTOMY ABDOMEN N/A 2018    Procedure: EMBOLECTOMY ABDOMEN;;  Surgeon: Ector Mcintyre MD;  Location: [facility] OR     EXTRACORPOREAL SHOCK WAVE LITHOTRIPSY (ESWL)       GASTRIC BYPASS       CO CYSTO/URETERO W/LITHOTRIPSY &INDWELL STENT INSRT Left 2018    Procedure: CYSTOSCOPY, LEFT URETEROSCOPY LASER LITHOTRIPSY STENT INSERTION;  Surgeon: Skyler Delvalle MD;  Location: [facility];  Service: Urology     CO LAP GASTRIC BYPASS/DERICK-EN-Y N/A 2016    Procedure: GASTRIC BYPASS LAPAROSCOPIC DERICK-EN-Y;  Surgeon: Randy Sutherland MD;  Location: [facility];  Service: General     TUBAL LIGATION       WISDOM TOOTH EXTRACTION Bilateral       SOCIAL HISTORY:   Social History     Tobacco Use     Smoking status: Never Smoker     Smokeless tobacco: Never Used   Vaping Use     Vaping Use: Never used   Substance Use Topics     Alcohol use: Yes     Comment: occ.     Drug use: No     FAMILY HISTORY:   Family History   Problem Relation Age of Onset     Depression Paternal Grandmother      Diabetes Mother      Diabetes Father      Hypertension Father      Chronic Obstructive Pulmonary Disease Father      Cancer Maternal Grandmother         gastric     Urolithiasis Maternal Grandmother      Heart Disease Maternal Grandfather      Cancer Maternal Grandfather         lung     Heart Disease Paternal Grandfather      Breast Cancer No family hx of      Colon Cancer No family hx of      Prostate Cancer No family hx of      Cerebrovascular Disease No family hx of      Clotting Disorder No family hx of      Gout No family hx of      ALLERGIES:   Allergies   Allergen Reactions     Bee Venom Swelling     Ibuprofen Hives and Swelling     CURRENT MEDICATIONS:   Current Outpatient Medications   Medication Instructions     acetaminophen (TYLENOL) 650 mg, Oral, EVERY 4 HOURS PRN     buPROPion  (WELLBUTRIN SR) 150 mg, Oral     calcitRIOL (ROCALTROL) 0.5 MCG capsule No dose, route, or frequency recorded.     calcium carbonate 500 mg (elemental) (OSCAL) 500 mg, Oral, 2 TIMES DAILY     clonazePAM (KLONOPIN) 0.5 mg, Oral, 2 TIMES DAILY PRN     diazepam (VALIUM) 5 mg, Oral, ONCE PRN     fludrocortisone (FLORINEF) 0.2 mg, Oral, DAILY     FLUoxetine (PROZAC) 60 mg, Oral, DAILY     gabapentin (NEURONTIN) 900 mg, Oral, 3 TIMES DAILY     hydrocortisone (CORTEF) 10 MG tablet Take 3 tablets (30 MG) every morning and take 2 tablets (20 MG) by mouth every day at 2 PM. Additional as directed.     levothyroxine (SYNTHROID/LEVOTHROID) 125 mcg, Oral, DAILY     ondansetron (ZOFRAN) 8 mg, Oral, EVERY 8 HOURS PRN     prochlorperazine (COMPAZINE) 5 mg, Oral, EVERY 8 HOURS PRN     temazepam (RESTORIL) 15 mg, Oral, AT BEDTIME PRN     UNABLE TO FIND Other, medical cannabis (Patient's own supply. Not a prescription)       PHYSICAL EXAMINATION:  Vital signs: LMP  (LMP Unknown)    Gen: NAD, on video no distress  No other exam    Recent Labs   Lab Test 10/25/21  0919 09/03/21  0806 08/10/21  1051 08/08/21  0050 07/05/21  0741    140 138 144 141   POTASSIUM 4.3 3.9 3.4 4.2 3.4   CHLORIDE 104 107 103 108* 110*   CO2 23 28 28 26 28   ANIONGAP 12 5 7 10 4   BUN 9 9 10 13 12   CR 0.75 0.79 0.79 0.69 0.83   GLC 84 92 73 72 73   SHASHA 8.7 7.9* 8.3* 8.5 7.9*     No results for input(s): MAG, PHOS in the last 69562 hours.  Recent Labs   Lab Test 10/25/21  0919 09/03/21  0806 08/10/21  1051 08/07/21  2244 07/05/21  0741 06/04/21  1205 05/10/21  1721   WBC 4.2 5.9 4.8 8.0 4.2 3.9* 3.8*   HGB 14.1 14.1 15.1 14.0 15.0 13.2 14.3    148* 110* 96* 107* 149* 200   MCV 94 95 97 97 97 96 95   NEUTROPHIL 68  --  69  --  46.3 64.5 60.2     Recent Labs   Lab Test 10/25/21  0919 09/03/21  0806 08/10/21  1051   BILITOTAL 0.3 0.2 0.3   ALKPHOS 103 97 90   ALT 17 17 15   AST 22 18 16   ALBUMIN 3.5 3.0* 3.3*     TSH   Date Value Ref Range Status    10/25/2021 0.35 0.30 - 5.00 uIU/mL Final   09/03/2021 0.28 (L) 0.40 - 4.00 mU/L Final   08/10/2021 0.44 0.40 - 4.00 mU/L Final   07/05/2021 0.22 (L) 0.40 - 4.00 mU/L Final   06/04/2021 0.44 0.40 - 4.00 mU/L Final   05/10/2021 0.18 (L) 0.40 - 4.00 mU/L Final     No results for input(s): CEA in the last 92690 hours.  Results for orders placed or performed in visit on 11/15/21   CT Chest/Abdomen/Pelvis w Contrast    Narrative    EXAMINATION: CT CHEST/ABDOMEN/PELVIS W CONTRAST, 11/15/2021 11:26 AM    TECHNIQUE: Helical CT images from the thoracic inlet through the  symphysis pubis were obtained with intravenous contrast. Contrast  dose: Isovue 370  96 mls    COMPARISON: CT 2521, 6/4/2021    HISTORY: Malignant neoplasm of adrenal gland, right (H) status post  right adrenalectomy and hepatic mobilization on 6/25/2018 with  subsequent chemoradiation therapy.    FINDINGS:    Chest: Thyroid gland is unremarkable. No supraclavicular or axillary  lymphadenopathy. No mediastinal lymphadenopathy. Cardiac size is  normal. No pericardial effusion. Aortic and pulmonary artery caliber  within normal limits. Visualized portions of the aortic arch branching  vessels are unremarkable. Esophagus is unremarkable.    Trachea and bronchi are patent and clear of debris. No evidence of  airspace disease. Scattered tiny sub-4mm pulmonary nodules. No  pneumothorax. No pleural effusion.    Abdomen and pelvis: Stable hepatic segment 5 hemangioma. No additional  hepatic lesions. Gallbladder is unremarkable. No intra- or  extrahepatic biliary dilation. Spleen and pancreas are unremarkable.  Postoperative changes of right adrenalectomy. No abnormal soft tissue  thickening along the surgical bed. The left adrenal gland is  unremarkable. Cortical hypoattenuation involving the right upper renal  pole, presumably posttreatment changes. Otherwise, the kidneys are  unremarkable without suspicious solid lesions. No hydronephrosis or  nephrolithiasis.  Ureters and urinary bladder are unremarkable. Uterus  with indwelling IUD is unremarkable. Left-sided corpus luteal cysts.    Postoperative changes of Tanika-en-Y gastric bypass. No evidence of  bowel obstruction. Colon is unremarkable. Normal caliber appendix.    No suspicious abdominal or pelvic lymphadenopathy. No free fluid. No  pneumoperitoneum.    Abdominal aorta is normal in caliber and patent. Celiac and mesenteric  artery origins are unremarkable. Portal veins and IVC are patent.    Bones and soft tissues: Suspicious osseous lesions. Soft tissue is  unremarkable.      Impression    IMPRESSION:   1a. Postoperative changes of right adrenalectomy. No evidence of local  recurrence or metastatic disease in the chest, abdomen, or pelvis.  1b. Stable presumed posttreatment changes in the right upper kidney.  Recommend continued attention on follow-up.  2. Stable right hepatic hemangioma.    I have personally reviewed the examination and initial interpretation  and I agree with the findings.    FRANKI EVANS MD         SYSTEM ID:  [id number]     *Note: Due to a large number of results and/or encounters for the requested time period, some results have not been displayed. A complete set of results can be found in Results Review.         IMAGING DATA:  8/25/21                                                     IMPRESSION:  1.  No evidence of recurrent or metastatic disease in the chest, abdomen or pelvis.  2.  Stable cortical thinning and hypoenhancement of the upper pole right kidney which most likely represents posttreatment change.    ASSESSMENT AND PLAN: A 36 year old  female with right-sided functioning high-risk adrenocortical carcinoma.     Adrenocortical carcinoma:  - Started on adjuvant mitotane in July 2018 based on her presentation and tumor characteristics including invasion of the adrenal capsule, high mitotic rate, possibly positive margins, and high Ki67, which could result in a rate of recurrence as high  as 40%.    The mitotane was stopped by Dr. Hilton and endocrinologic oncologist from [facility].  She had a level drawn on 5/10/2021, 6 days after discontinuation of mitotane with level of 10.5.  She informs that Dr. Liang would like to continue with mitotane levels every month and interval imaging every 3 months from now .  --We will continue with scans in three months for now and they will be spaced out to every four months some time next year.  --will continue to follow with Dr Huertas ([facility]).    We will continue to hydrocortisone at 15/10.  She continues to see declines in the mitotane serum levels off therapy.    She is weaning from Florinef and bPs are ok.   Mild neuropathy pain,      She had a fall and hit her head. She did not get evaluation done for this. I have recommended that we get brain MRI since she is noticing headaches. She would like to defer brain MRI at this time.     Addendum: She noticed return of headaches, dizziness, difficulty with word finding, fatigue and stiff neck while working on her computer for work. We did get an urgent/state brain MRI to evaluate this further which has been normal.      25 minutes spent on the date of the encounter doing chart review, history and exam, documentation and further activities as noted above        Tru Hernadez  Hematologist and Medical Oncologist  [facility]

## 2021-11-18 ENCOUNTER — TELEPHONE (OUTPATIENT)
Dept: ONCOLOGY | Facility: CLINIC | Age: 36
End: 2021-11-18
Payer: COMMERCIAL

## 2021-11-18 NOTE — TELEPHONE ENCOUNTER
[facility] Telephone Triage Note    Assessment:   Suzy (pt) reporting the following symptoms:  Dr. Hernadez visit yesterday 11/17, it was discussed that had right black eye and was told if still experiencing symptoms to call back and would consider getting an MRI of brain.     Today back at work, looking at computer screens and starting to feel:  Headache, dizziness, fuzzy head and feels not thinking straight, hard time finding words, fatigued. Neck is also feeling stiff.   Pain rating 6/7/10  Pt is also having blurred vision since Sunday morning 11/14  Since Sunday has been in a quiet/dark environment, but since back to work today feeling worse.     This writer discussed recommended Emergency Room. Pt declined want for Emergency Room and hoping to manage outpatient as does not want to wait in ER.   This writer discussed go to ER and do not delay care if experiencing of sudden vomiting, loss or altered LOC, fever 101 or higher, seizure activity, or loss of vision.   Suzy verbalized understanding of emergency room parameters.       Recommendations: Paged provider 10:35am Dr. Hernadez  10:45am Per Dr. Hernadez, will enter MRI order stat with hope it can get scheduled STAT today (or in next couple days)    Follow-Up:  Scheduling notified to coordinate MRI of brain and call pt.

## 2021-11-18 NOTE — PROGRESS NOTES
TC to pt asking if she'd be willing to convert her visit with Dr. Hernadez to video from in-person. Pt agreeable to plan. Message sent to scheduling. Dr. Hernadez updated.

## 2021-11-18 NOTE — TELEPHONE ENCOUNTER
I spoke to Suzy. She has persistent headaches and would like to have MRI done. I explained her that the earliest we have a visit is for Monday. Alternately she should go to ED as they get a priority for scans. She would not like to go to the ED at this time. She does not have any neurologic deficits. Headaches have not changed character since the fall. I encouraged her to go to ED if she feels things are getting worse. Otherwise she will keep her appointment for Monday 11/22/21.    Tru Hernadez    Hematologist and Medical Oncologist  [facility]

## 2021-11-20 ENCOUNTER — MYC MEDICAL ADVICE (OUTPATIENT)
Dept: FAMILY MEDICINE | Facility: CLINIC | Age: 36
End: 2021-11-20
Payer: COMMERCIAL

## 2021-11-22 ENCOUNTER — HOSPITAL ENCOUNTER (OUTPATIENT)
Dept: MRI IMAGING | Facility: CLINIC | Age: 36
Discharge: HOME | End: 2021-11-22
Attending: INTERNAL MEDICINE | Admitting: INTERNAL MEDICINE
Payer: COMMERCIAL

## 2021-11-22 ENCOUNTER — TELEPHONE (OUTPATIENT)
Dept: ONCOLOGY | Facility: CLINIC | Age: 36
End: 2021-11-22
Payer: COMMERCIAL

## 2021-11-22 DIAGNOSIS — R51.9 ACUTE NONINTRACTABLE HEADACHE, UNSPECIFIED HEADACHE TYPE: ICD-10-CM

## 2021-11-22 DIAGNOSIS — F40.240 CLAUSTROPHOBIA: ICD-10-CM

## 2021-11-22 DIAGNOSIS — C74.01 MALIGNANT NEOPLASM OF CORTEX OF RIGHT ADRENAL GLAND (H): ICD-10-CM

## 2021-11-22 DIAGNOSIS — S09.90XA HEAD TRAUMA, INITIAL ENCOUNTER: ICD-10-CM

## 2021-11-22 PROCEDURE — 255N000002 HC RX 255 OP 636: Performed by: INTERNAL MEDICINE

## 2021-11-22 PROCEDURE — 70553 MRI BRAIN STEM W/O & W/DYE: CPT

## 2021-11-22 PROCEDURE — A9585 GADOBUTROL INJECTION: HCPCS | Performed by: INTERNAL MEDICINE

## 2021-11-22 RX ORDER — GADOBUTROL 604.72 MG/ML
7 INJECTION INTRAVENOUS ONCE
Status: COMPLETED | OUTPATIENT
Start: 2021-11-22 | End: 2021-11-22

## 2021-11-22 RX ORDER — DIAZEPAM 5 MG
5 TABLET ORAL
Qty: 2 TABLET | Refills: 0 | Status: SHIPPED | OUTPATIENT
Start: 2021-11-22 | End: 2022-02-01

## 2021-11-22 RX ADMIN — GADOBUTROL 7 ML: 604.72 INJECTION INTRAVENOUS at 17:16

## 2021-11-22 NOTE — TELEPHONE ENCOUNTER
Pt is calling to check on valium order.  Saw Dr. Hernadez on 11/17/21.  Scheduled for an MRI at [facility] today at 3:15.  Pharmacy has not received order for valium.    Paged Dr. Hernadez at [phone]   Call back from Syl as Dr. Hernadez cannot assist right now. KAVITA Streeter will send order for RX to pharmacy for pt to .    Pt informed that pharmacy received the RX for valium.

## 2021-11-23 ENCOUNTER — PATIENT OUTREACH (OUTPATIENT)
Dept: CARE COORDINATION | Facility: CLINIC | Age: 36
End: 2021-11-23
Payer: COMMERCIAL

## 2021-11-24 NOTE — PROGRESS NOTES
Oncology Distress Screening Follow-up  Clinical Social Work  [facility]    Identified Concern and Score From Distress Screening:  3. How concerned are you about feeling depressed or very sad?  6 Abnormal        4. How concerned are you about feeling anxious or very scared?  7 Abnormal        6. How concerned are you about work and home life issues that may be affected by your cancer?  10              Date of Distress Screenin21      Data: Ms. Rodríguez is a 36 year old woman with adrenocortical carcinoma. At time of last visit, Patient scored positive on distress screen.  called Patient today with intention of introducing them to psychosocial services and support, and following up on elevated distress.        Intervention/Education Provided: Phone call to patient about distress screening. No answer - message left. Provided contact information in order to reach writer.       Follow-up Required: Will remain available for support and await patient's return call.          Ena LANCASTER, DHIRAJ  - Oncology  Phone : [phone]  Pager: [phone]

## 2021-12-12 ENCOUNTER — MYC REFILL (OUTPATIENT)
Dept: FAMILY MEDICINE | Facility: CLINIC | Age: 36
End: 2021-12-12
Payer: COMMERCIAL

## 2021-12-12 DIAGNOSIS — F98.8 ATTENTION DEFICIT DISORDER, UNSPECIFIED HYPERACTIVITY PRESENCE: ICD-10-CM

## 2021-12-13 RX ORDER — DEXTROAMPHETAMINE SACCHARATE, AMPHETAMINE ASPARTATE MONOHYDRATE, DEXTROAMPHETAMINE SULFATE AND AMPHETAMINE SULFATE 5; 5; 5; 5 MG/1; MG/1; MG/1; MG/1
20 CAPSULE, EXTENDED RELEASE ORAL DAILY
Qty: 30 CAPSULE | Refills: 0 | Status: SHIPPED | OUTPATIENT
Start: 2021-12-13 | End: 2022-01-13

## 2021-12-13 NOTE — TELEPHONE ENCOUNTER
Routing refill request to provider for review/approval because:  Drug not on the [facility] refill protocol   Almita Ellis RN

## 2021-12-22 DIAGNOSIS — C74.91: Primary | ICD-10-CM

## 2021-12-22 DIAGNOSIS — R94.6 ABNORMAL RESULTS OF THYROID FUNCTION STUDIES: ICD-10-CM

## 2021-12-31 ENCOUNTER — LAB (OUTPATIENT)
Dept: LAB | Facility: CLINIC | Age: 36
End: 2021-12-31
Payer: COMMERCIAL

## 2021-12-31 DIAGNOSIS — R94.6 ABNORMAL RESULTS OF THYROID FUNCTION STUDIES: ICD-10-CM

## 2021-12-31 DIAGNOSIS — C74.91: ICD-10-CM

## 2021-12-31 LAB
ALBUMIN SERPL-MCNC: 3.1 G/DL (ref 3.4–5)
ALP SERPL-CCNC: 144 U/L (ref 40–150)
ALT SERPL W P-5'-P-CCNC: 33 U/L (ref 0–50)
ANION GAP SERPL CALCULATED.3IONS-SCNC: 2 MMOL/L (ref 3–14)
AST SERPL W P-5'-P-CCNC: 29 U/L (ref 0–45)
BASOPHILS # BLD AUTO: 0 10E3/UL (ref 0–0.2)
BASOPHILS NFR BLD AUTO: 0 %
BILIRUB SERPL-MCNC: 0.3 MG/DL (ref 0.2–1.3)
BUN SERPL-MCNC: 7 MG/DL (ref 7–30)
CALCIUM SERPL-MCNC: 8.6 MG/DL (ref 8.5–10.1)
CHLORIDE BLD-SCNC: 105 MMOL/L (ref 94–109)
CO2 SERPL-SCNC: 30 MMOL/L (ref 20–32)
CREAT SERPL-MCNC: 0.63 MG/DL (ref 0.52–1.04)
EOSINOPHIL # BLD AUTO: 0 10E3/UL (ref 0–0.7)
EOSINOPHIL NFR BLD AUTO: 1 %
ERYTHROCYTE [DISTWIDTH] IN BLOOD BY AUTOMATED COUNT: 13.2 % (ref 10–15)
GFR SERPL CREATININE-BSD FRML MDRD: >90 ML/MIN/1.73M2
GLUCOSE BLD-MCNC: 131 MG/DL (ref 70–99)
HCT VFR BLD AUTO: 42.1 % (ref 35–47)
HGB BLD-MCNC: 13.8 G/DL (ref 11.7–15.7)
LYMPHOCYTES # BLD AUTO: 0.7 10E3/UL (ref 0.8–5.3)
LYMPHOCYTES NFR BLD AUTO: 14 %
MCH RBC QN AUTO: 31.2 PG (ref 26.5–33)
MCHC RBC AUTO-ENTMCNC: 32.8 G/DL (ref 31.5–36.5)
MCV RBC AUTO: 95 FL (ref 78–100)
MONOCYTES # BLD AUTO: 0.4 10E3/UL (ref 0–1.3)
MONOCYTES NFR BLD AUTO: 7 %
NEUTROPHILS # BLD AUTO: 4.1 10E3/UL (ref 1.6–8.3)
NEUTROPHILS NFR BLD AUTO: 79 %
PLATELET # BLD AUTO: 166 10E3/UL (ref 150–450)
POTASSIUM BLD-SCNC: 4.5 MMOL/L (ref 3.4–5.3)
PROT SERPL-MCNC: 6.3 G/DL (ref 6.8–8.8)
RBC # BLD AUTO: 4.42 10E6/UL (ref 3.8–5.2)
SODIUM SERPL-SCNC: 137 MMOL/L (ref 133–144)
T4 FREE SERPL-MCNC: 1.05 NG/DL (ref 0.76–1.46)
TSH SERPL DL<=0.005 MIU/L-ACNC: 0.27 MU/L (ref 0.4–4)
WBC # BLD AUTO: 5.2 10E3/UL (ref 4–11)

## 2021-12-31 PROCEDURE — 82088 ASSAY OF ALDOSTERONE: CPT

## 2021-12-31 PROCEDURE — 36415 COLL VENOUS BLD VENIPUNCTURE: CPT

## 2021-12-31 PROCEDURE — 80053 COMPREHEN METABOLIC PANEL: CPT

## 2021-12-31 PROCEDURE — 84443 ASSAY THYROID STIM HORMONE: CPT

## 2021-12-31 PROCEDURE — 84439 ASSAY OF FREE THYROXINE: CPT

## 2021-12-31 PROCEDURE — 84244 ASSAY OF RENIN: CPT | Mod: 90

## 2021-12-31 PROCEDURE — 82533 TOTAL CORTISOL: CPT

## 2021-12-31 PROCEDURE — 82627 DEHYDROEPIANDROSTERONE: CPT

## 2021-12-31 PROCEDURE — 80375 DRUG/SUBSTANCE NOS 1-3: CPT | Mod: 90

## 2021-12-31 PROCEDURE — 85025 COMPLETE CBC W/AUTO DIFF WBC: CPT

## 2021-12-31 PROCEDURE — 82024 ASSAY OF ACTH: CPT

## 2022-01-06 ENCOUNTER — MYC REFILL (OUTPATIENT)
Dept: ONCOLOGY | Facility: CLINIC | Age: 37
End: 2022-01-06
Payer: COMMERCIAL

## 2022-01-06 DIAGNOSIS — C74.01 MALIGNANT NEOPLASM OF CORTEX OF RIGHT ADRENAL GLAND (H): ICD-10-CM

## 2022-01-06 DIAGNOSIS — F51.01 PRIMARY INSOMNIA: ICD-10-CM

## 2022-01-06 RX ORDER — TEMAZEPAM 15 MG/1
15-30 CAPSULE ORAL
Qty: 60 CAPSULE | Refills: 0 | Status: SHIPPED | OUTPATIENT
Start: 2022-01-06 | End: 2022-02-04

## 2022-01-06 NOTE — TELEPHONE ENCOUNTER
Pending Prescriptions:                       Disp   Refills    temazepam (RESTORIL) 15 MG capsule        60 cap*0            Sig: Take 1-2 capsules (15-30 mg) by mouth nightly as           needed for sleep      Last Written Prescription Date:  11/17/21  Last Fill Quantity: 60,   # refills: 0  Last Office Visit: 11/17/21  Future Office visit: TBT    Routing refill request to provider.     Mahendra Gibson, RN, BSN.  RN Care Coordinator     [facility]   [phone]

## 2022-01-06 NOTE — TELEPHONE ENCOUNTER
Signed Prescriptions:                        Disp   Refills    temazepam (RESTORIL) 15 MG capsule         60 cap*0        Sig: Take 1-2 capsules (15-30 mg) by mouth nightly as           needed for sleep  Authorizing Provider: SANDRA CRUZ, RN, BSN, OCN  Nurse Care Coordinator  [facility]  P: [phone]     F: [phone]

## 2022-01-13 ENCOUNTER — MYC REFILL (OUTPATIENT)
Dept: FAMILY MEDICINE | Facility: CLINIC | Age: 37
End: 2022-01-13
Payer: COMMERCIAL

## 2022-01-13 DIAGNOSIS — F98.8 ATTENTION DEFICIT DISORDER, UNSPECIFIED HYPERACTIVITY PRESENCE: ICD-10-CM

## 2022-01-14 ENCOUNTER — TELEPHONE (OUTPATIENT)
Dept: FAMILY MEDICINE | Facility: CLINIC | Age: 37
End: 2022-01-14
Payer: COMMERCIAL

## 2022-01-14 ENCOUNTER — TELEPHONE (OUTPATIENT)
Dept: ONCOLOGY | Facility: CLINIC | Age: 37
End: 2022-01-14
Payer: COMMERCIAL

## 2022-01-14 RX ORDER — DEXTROAMPHETAMINE SACCHARATE, AMPHETAMINE ASPARTATE MONOHYDRATE, DEXTROAMPHETAMINE SULFATE AND AMPHETAMINE SULFATE 5; 5; 5; 5 MG/1; MG/1; MG/1; MG/1
20 CAPSULE, EXTENDED RELEASE ORAL DAILY
Qty: 30 CAPSULE | Refills: 0 | Status: SHIPPED | OUTPATIENT
Start: 2022-01-14 | End: 2022-03-25

## 2022-01-14 NOTE — TELEPHONE ENCOUNTER
Call placed to patient  No answer; voicemail left advising patient to reach out to the ordering provider for recommendations   Clinic RN call back number [phone] provided if questions/concerns    Juanito King RN

## 2022-01-14 NOTE — TELEPHONE ENCOUNTER
Pt is calling because she wants the mitotane test results.  Informed Suzy that I would forward message to Padmini SAUL so she could call the pt back with the test results.    Routed to Dr. Hernadez and [facility]

## 2022-01-14 NOTE — TELEPHONE ENCOUNTER
Reason for Call:  Other call back    Detailed comments: Pt calling wanting tests results from 12/31/21 Not showing up in My Chart    Phone Number Patient can be reached at: Home number on file [phone] (home)    Best Time: any    Can we leave a detailed message on this number? YES    Call taken on 1/14/2022 at 10:56 AM by Brea Chan

## 2022-01-17 NOTE — TELEPHONE ENCOUNTER
Call placed to patient  No answer; generic voicemail left requesting call back to Clinic RN at [phone]    Juanito King RN

## 2022-01-17 NOTE — TELEPHONE ENCOUNTER
Relayed message that patient will have to get results from ordering doctor.      Daisy Spencer, ELAYNE Johnston

## 2022-01-18 ENCOUNTER — OFFICE VISIT (OUTPATIENT)
Dept: FAMILY MEDICINE | Facility: CLINIC | Age: 37
End: 2022-01-18
Payer: COMMERCIAL

## 2022-01-18 VITALS
HEART RATE: 76 BPM | HEIGHT: 64 IN | TEMPERATURE: 97.6 F | SYSTOLIC BLOOD PRESSURE: 117 MMHG | DIASTOLIC BLOOD PRESSURE: 83 MMHG | WEIGHT: 155.5 LBS | RESPIRATION RATE: 16 BRPM | BODY MASS INDEX: 26.55 KG/M2

## 2022-01-18 DIAGNOSIS — C74.01 MALIGNANT NEOPLASM OF CORTEX OF RIGHT ADRENAL GLAND (H): ICD-10-CM

## 2022-01-18 DIAGNOSIS — E24.9 CUSHING'S SYNDROME (H): ICD-10-CM

## 2022-01-18 DIAGNOSIS — C74.01 ADRENAL CORTICAL ADENOCARCINOMA OF RIGHT ADRENAL GLAND (H): ICD-10-CM

## 2022-01-18 DIAGNOSIS — E27.1 PRIMARY ADRENAL INSUFFICIENCY (H): ICD-10-CM

## 2022-01-18 DIAGNOSIS — F33.1 MAJOR DEPRESSIVE DISORDER, RECURRENT EPISODE, MODERATE WITH ANXIOUS DISTRESS (H): ICD-10-CM

## 2022-01-18 DIAGNOSIS — G40.89 OTHER SEIZURES (H): Primary | ICD-10-CM

## 2022-01-18 DIAGNOSIS — G25.0 ESSENTIAL TREMOR: ICD-10-CM

## 2022-01-18 LAB
ALBUMIN SERPL-MCNC: 3.4 G/DL (ref 3.4–5)
ALP SERPL-CCNC: 126 U/L (ref 40–150)
ALT SERPL W P-5'-P-CCNC: 33 U/L (ref 0–50)
ANION GAP SERPL CALCULATED.3IONS-SCNC: 4 MMOL/L (ref 3–14)
AST SERPL W P-5'-P-CCNC: 25 U/L (ref 0–45)
BILIRUB SERPL-MCNC: 0.3 MG/DL (ref 0.2–1.3)
BUN SERPL-MCNC: 9 MG/DL (ref 7–30)
CALCIUM SERPL-MCNC: 8.5 MG/DL (ref 8.5–10.1)
CHLORIDE BLD-SCNC: 107 MMOL/L (ref 94–109)
CO2 SERPL-SCNC: 27 MMOL/L (ref 20–32)
CREAT SERPL-MCNC: 0.74 MG/DL (ref 0.52–1.04)
ERYTHROCYTE [DISTWIDTH] IN BLOOD BY AUTOMATED COUNT: 13 % (ref 10–15)
GFR SERPL CREATININE-BSD FRML MDRD: >90 ML/MIN/1.73M2
GLUCOSE BLD-MCNC: 87 MG/DL (ref 70–99)
HCT VFR BLD AUTO: 41.6 % (ref 35–47)
HGB BLD-MCNC: 13.8 G/DL (ref 11.7–15.7)
MCH RBC QN AUTO: 30.3 PG (ref 26.5–33)
MCHC RBC AUTO-ENTMCNC: 33.2 G/DL (ref 31.5–36.5)
MCV RBC AUTO: 91 FL (ref 78–100)
PLATELET # BLD AUTO: 252 10E3/UL (ref 150–450)
POTASSIUM BLD-SCNC: 4.4 MMOL/L (ref 3.4–5.3)
PROT SERPL-MCNC: 6.4 G/DL (ref 6.8–8.8)
RBC # BLD AUTO: 4.56 10E6/UL (ref 3.8–5.2)
SODIUM SERPL-SCNC: 138 MMOL/L (ref 133–144)
TSH SERPL DL<=0.005 MIU/L-ACNC: 0.72 MU/L (ref 0.4–4)
WBC # BLD AUTO: 6.3 10E3/UL (ref 4–11)

## 2022-01-18 PROCEDURE — 36415 COLL VENOUS BLD VENIPUNCTURE: CPT | Performed by: FAMILY MEDICINE

## 2022-01-18 PROCEDURE — 80053 COMPREHEN METABOLIC PANEL: CPT | Performed by: FAMILY MEDICINE

## 2022-01-18 PROCEDURE — 99214 OFFICE O/P EST MOD 30 MIN: CPT | Performed by: FAMILY MEDICINE

## 2022-01-18 PROCEDURE — 85027 COMPLETE CBC AUTOMATED: CPT | Performed by: FAMILY MEDICINE

## 2022-01-18 PROCEDURE — 84443 ASSAY THYROID STIM HORMONE: CPT | Performed by: FAMILY MEDICINE

## 2022-01-18 ASSESSMENT — MIFFLIN-ST. JEOR: SCORE: 1380.9

## 2022-01-18 ASSESSMENT — PAIN SCALES - GENERAL: PAINLEVEL: NO PAIN (0)

## 2022-01-18 NOTE — PROGRESS NOTES
Assessment/Plan:    Suzy Rodríguez is a 36 year old female presenting for:    Other seizures (H)  Laboratory testing below was done. Suspect that this possibly could have been a seizure given the myriad of medications that she is taking which might lower seizure threshold including Wellbutrin, gabapentin and potentially the Adderall as well as a few alcoholic beverages. She has been on the Wellbutrin and gabapentin for quite some time with the Adderall is a new addition. She will stop taking the Adderall at this point.    I did offer a referral to see a neurologist but she declines at this time. Certainly if there are any further episodes we will need her to see specialist.  - Comprehensive metabolic panel (BMP + Alb, Alk Phos, ALT, AST, Total. Bili, TP)  - CBC with platelets  - TSH with free T4 reflex  - Comprehensive metabolic panel (BMP + Alb, Alk Phos, ALT, AST, Total. 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        Chief Complaint:  Recheck Medication        Subjective:   Suzy Rodríguez is a pleasant 36-year-old female presenting to the clinic today for concerns over seizure-like episode.    Patient has a past medical history significant for cancer of her adrenal gland with adrenal insufficiency, depressive disorder, ADHD presenting to the clinic after an  "episode which occurred this past weekend.    Patient was recently started on Adderall for her ADHD. She is also on Wellbutrin for depression as well as gabapentin for chronic pain.    She was at a event for her 's work. She states that she had 2 drinks over the period of 2 to 3 hours. It was noted that the patient was acting a bit odd and was slurring her words. She states she then \"passed out\" but she was told that she was somewhat unresponsive and limp (although breathing without difficulty). Because of this EMT was called. She did not have any shaking/seizure-like activity. She did not lose control of her bowel or bladder. She did bite her inner lip.    She states that EMT took her vitals and recommended that she be seen at the hospital but she declined. She ended up being taken home and was feeling fine by the next morning.    She states that on more consideration her sister had an episode like this when her sister was on Adderall as well.    She does not have any past medical history of seizures. She does not think there is any chance that there was something in her drink that was not expected to be there.        12 point review of systems completed and negative except for what has been described above    History   Smoking Status     Never Smoker   Smokeless Tobacco     Never Used         Current Outpatient Medications:      acetaminophen (TYLENOL) 325 MG tablet, Take 2 tablets (650 mg) by mouth every 4 hours as needed for other (multimodal surgical pain management along with NSAIDS and opioid medication as indicated based on pain control and physical function.), Disp: 150 tablet, Rfl: 0     buPROPion (WELLBUTRIN SR) 150 MG 12 hr tablet, TAKE 1 TABLET (150 MG TOTAL) BY MOUTH 2 (TWO) TIMES A DAY., Disp: 180 tablet, Rfl: 1     calcitRIOL (ROCALTROL) 0.5 MCG capsule, , Disp: , Rfl:      FLUoxetine (PROZAC) 20 MG capsule, Take 3 capsules (60 mg) by mouth daily, Disp: 270 capsule, Rfl: 1     gabapentin " (NEURONTIN) 300 MG capsule, Take 3 capsules (900 mg) by mouth 3 times daily, Disp: 810 capsule, Rfl: 1     hydrocortisone (CORTEF) 10 MG tablet, Take 3 tablets (30 MG) every morning and take 2 tablets (20 MG) by mouth every day at 2 PM. Additional as directed. (Patient taking differently: Take 2 tablets (20 MG) every morning and take 1 tablets (10 MG) by mouth every day at 2 PM. Additional as directed.), Disp: 550 tablet, Rfl: 3     levothyroxine (SYNTHROID/LEVOTHROID) 125 MCG tablet, Take 1 tablet (125 mcg) by mouth daily (Patient taking differently: Take 125 mcg by mouth daily On Sunday's - 250 mg), Disp: 90 tablet, Rfl: 3     ondansetron (ZOFRAN) 8 MG tablet, Take 1 tablet (8 mg) by mouth every 8 hours as needed for nausea, Disp: 30 tablet, Rfl: 0     temazepam (RESTORIL) 15 MG capsule, Take 1-2 capsules (15-30 mg) by mouth nightly as needed for sleep, Disp: 60 capsule, Rfl: 0     amphetamine-dextroamphetamine (ADDERALL XR) 20 MG 24 hr capsule, Take 1 capsule (20 mg) by mouth daily (Patient not taking: Reported on 1/18/2022), Disp: 30 capsule, Rfl: 0     amphetamine-dextroamphetamine (ADDERALL XR) 20 MG 24 hr capsule, Take 1 capsule (20 mg) by mouth daily (Patient not taking: Reported on 1/18/2022), Disp: 30 capsule, Rfl: 0     clonazePAM (KLONOPIN) 0.5 MG tablet, Take 1 tablet (0.5 mg) by mouth 2 times daily as needed for anxiety (Patient not taking: Reported on 10/29/2021), Disp: 60 tablet, Rfl: 0     diazepam (VALIUM) 5 MG tablet, Take 1 tablet (5 mg) by mouth once as needed for anxiety (MRI claustrophobia management) (Patient not taking: Reported on 1/18/2022), Disp: 2 tablet, Rfl: 0     medical cannabis (Patient's own supply), See Admin Instructions (The purpose of this order is to document that the patient reports taking medical cannabis.  This is not a prescription, and is not used to certify that the patient has a qualifying medical condition.) (Patient not taking: Reported on 1/18/2022), Disp: , Rfl:      " prochlorperazine (COMPAZINE) 5 MG tablet, Take 1 tablet (5 mg) by mouth every 8 hours as needed for nausea or vomiting (Patient not taking: Reported on 1/18/2022), Disp: 90 tablet, Rfl: 3      Objective:  Vitals:    01/18/22 1535   BP: 117/83   Pulse: 76   Resp: 16   Temp: 97.6  F (36.4  C)   TempSrc: Tympanic   Weight: 70.5 kg (155 lb 8 oz)   Height: 1.634 m (5' 4.35\")       Body mass index is 26.4 kg/m .    Vital signs reviewed and stable  General: No acute distress  Psych: Appropriate affect  HEENT: moist mucous membranes, pupils equal, round, reactive to light and accomodation, tympanic membranes are pearly grey bilaterally  Lymph: no cervical or supraclavicular lymphadenopathy  Cardiovascular: regular rate and rhythm with no murmur  Pulmonary: clear to auscultation bilaterally with no wheeze  Abdomen: soft, non tender, non distended with normo-active bowel sounds  Extremities: warm and well perfused with no edema  Skin: warm and dry with no rash         This note has been dictated and transcribed using voice recognition software.   Any errors in transcription are unintentional and inherent to the software.  "

## 2022-01-20 ENCOUNTER — LAB (OUTPATIENT)
Dept: LAB | Facility: CLINIC | Age: 37
End: 2022-01-20
Payer: COMMERCIAL

## 2022-01-20 DIAGNOSIS — C74.91: Primary | ICD-10-CM

## 2022-01-20 DIAGNOSIS — C74.91 MALIGNANT NEOPLASM OF ADRENAL GLAND, RIGHT (H): ICD-10-CM

## 2022-01-20 DIAGNOSIS — C74.91: ICD-10-CM

## 2022-01-20 DIAGNOSIS — E27.40 UNSPECIFIED ADRENOCORTICAL INSUFFICIENCY (H): ICD-10-CM

## 2022-01-20 PROCEDURE — 82530 CORTISOL FREE: CPT | Mod: 90

## 2022-01-20 PROCEDURE — 82542 COL CHROMOTOGRAPHY QUAL/QUAN: CPT | Mod: 90

## 2022-01-20 PROCEDURE — 99000 SPECIMEN HANDLING OFFICE-LAB: CPT

## 2022-01-26 ENCOUNTER — MYC MEDICAL ADVICE (OUTPATIENT)
Dept: ONCOLOGY | Facility: CLINIC | Age: 37
End: 2022-01-26
Payer: COMMERCIAL

## 2022-01-26 LAB
COLLECT DURATION TIME UR: 24 H
CORTIS 24H UR-MRATE: 93 MCG/24 H (ref 3.5–45)
CORTISONE 24H UR-MRATE: 110 MCG/24 H (ref 17–129)
SPECIMEN VOL 24H UR: 1900 ML

## 2022-02-01 ENCOUNTER — OFFICE VISIT (OUTPATIENT)
Dept: FAMILY MEDICINE | Facility: CLINIC | Age: 37
End: 2022-02-01
Payer: COMMERCIAL

## 2022-02-01 VITALS
HEIGHT: 64 IN | RESPIRATION RATE: 20 BRPM | HEART RATE: 88 BPM | WEIGHT: 156.5 LBS | SYSTOLIC BLOOD PRESSURE: 116 MMHG | DIASTOLIC BLOOD PRESSURE: 71 MMHG | BODY MASS INDEX: 26.72 KG/M2

## 2022-02-01 DIAGNOSIS — B37.31 YEAST VAGINITIS: Primary | ICD-10-CM

## 2022-02-01 LAB
CLUE CELLS: ABNORMAL
TRICHOMONAS, WET PREP: ABNORMAL
WBC'S/HIGH POWER FIELD, WET PREP: ABNORMAL
YEAST, WET PREP: PRESENT

## 2022-02-01 PROCEDURE — 87210 SMEAR WET MOUNT SALINE/INK: CPT | Performed by: FAMILY MEDICINE

## 2022-02-01 PROCEDURE — 99213 OFFICE O/P EST LOW 20 MIN: CPT | Performed by: FAMILY MEDICINE

## 2022-02-01 RX ORDER — FLUCONAZOLE 150 MG/1
150 TABLET ORAL ONCE
Qty: 1 TABLET | Refills: 0 | Status: SHIPPED | OUTPATIENT
Start: 2022-02-01 | End: 2022-02-01

## 2022-02-01 ASSESSMENT — MIFFLIN-ST. JEOR: SCORE: 1385.44

## 2022-02-01 NOTE — PROGRESS NOTES
"  Assessment & Plan     Yeast vaginitis  Recommended oral fluconazole treatment.  Follow-up if not improving as expected.  - Wet prep - Clinic Collect  - fluconazole (DIFLUCAN) 150 MG tablet; Take 1 tablet (150 mg) by mouth once for 1 dose                 Return in about 8 months (around 10/1/2022) for Routine preventive.    Gillian Garzon MD  [facility]    Bebo Almonte is a 36 year old who presents for the following health issues     HPI     Patient presents today for evaluation of vaginal itching, burning and discomfort for the past 5 days.  She used OTC Monistat 4 days ago and again 2 days ago without significant improvement in her symptoms.  She hasn't had a lot of discharge, and has no concerns for sexually transmitted infections.    Review of Systems   Constitutional, HEENT, cardiovascular, pulmonary, gi and gu systems are negative, except as otherwise noted.      Objective    /71 (BP Location: Left arm, Patient Position: Sitting, Cuff Size: Adult Regular)   Pulse 88   Resp 20   Ht 1.634 m (5' 4.35\")   Wt 71 kg (156 lb 8 oz)   LMP 01/18/2022   Breastfeeding No   BMI 26.57 kg/m    Body mass index is 26.57 kg/m .  Physical Exam   GENERAL: healthy, alert and no distress  RESP: breathing comfortably, no cough during the visit   (female): normal female external genitalia, normal urethral meatus, vaginal mucosa, normal cervix/adnexa/uterus without masses; minimal white discharge  MS: no gross musculoskeletal defects noted, no edema  PSYCH: mentation appears normal, affect normal/bright    Results for orders placed or performed in visit on 02/01/22   Wet prep - Clinic Collect     Status: Abnormal    Specimen: Vagina; Swab   Result Value Ref Range    Trichomonas Absent Absent    Yeast Present (A) Absent    Clue Cells Absent Absent    WBCs/high power field 1+ (A) None           "

## 2022-02-04 DIAGNOSIS — E27.40 ADRENAL INSUFFICIENCY (H): ICD-10-CM

## 2022-02-04 DIAGNOSIS — F51.01 PRIMARY INSOMNIA: ICD-10-CM

## 2022-02-04 DIAGNOSIS — C74.01 MALIGNANT NEOPLASM OF CORTEX OF RIGHT ADRENAL GLAND (H): ICD-10-CM

## 2022-02-04 RX ORDER — TEMAZEPAM 15 MG/1
15-30 CAPSULE ORAL
Qty: 60 CAPSULE | Refills: 0 | Status: SHIPPED | OUTPATIENT
Start: 2022-02-04 | End: 2022-03-08

## 2022-02-04 NOTE — CONFIDENTIAL NOTE
Restoril refill   Last prescribing provider: Dr hernadez     Last clinic visit date: 11/17/21 Dr Hernadez     Any missed appointments or no-shows since last clinic visit?: No     Recommendations for requested medication (if none, N/A): Copied from chart note 11/17/21 Dr Hernadez     temazepam (RESTORIL) 15 mg, Oral, AT BEDTIME PRN       Next clinic visit date: 2/16/22 Dr Hernadez     Any other pertinent information (if none, N/A): N/A

## 2022-02-07 RX ORDER — HYDROCORTISONE 10 MG/1
TABLET ORAL
Qty: 550 TABLET | Refills: 0 | Status: SHIPPED | OUTPATIENT
Start: 2022-02-07 | End: 2022-04-09

## 2022-02-08 ENCOUNTER — ANCILLARY PROCEDURE (OUTPATIENT)
Dept: CT IMAGING | Facility: CLINIC | Age: 37
End: 2022-02-08
Attending: INTERNAL MEDICINE
Payer: COMMERCIAL

## 2022-02-08 ENCOUNTER — LAB (OUTPATIENT)
Dept: LAB | Facility: CLINIC | Age: 37
End: 2022-02-08
Attending: INTERNAL MEDICINE
Payer: COMMERCIAL

## 2022-02-08 DIAGNOSIS — E03.8 SECONDARY HYPOTHYROIDISM: ICD-10-CM

## 2022-02-08 DIAGNOSIS — C74.01 MALIGNANT NEOPLASM OF CORTEX OF RIGHT ADRENAL GLAND (H): ICD-10-CM

## 2022-02-08 DIAGNOSIS — F41.9 ANXIETY: ICD-10-CM

## 2022-02-08 DIAGNOSIS — C74.91 MALIGNANT NEOPLASM OF ADRENAL GLAND, RIGHT (H): ICD-10-CM

## 2022-02-08 DIAGNOSIS — E27.40 UNSPECIFIED ADRENOCORTICAL INSUFFICIENCY (H): ICD-10-CM

## 2022-02-08 DIAGNOSIS — F51.01 PRIMARY INSOMNIA: ICD-10-CM

## 2022-02-08 LAB
ALBUMIN SERPL-MCNC: 3.4 G/DL (ref 3.4–5)
ALP SERPL-CCNC: 118 U/L (ref 40–150)
ALT SERPL W P-5'-P-CCNC: 32 U/L (ref 0–50)
ANION GAP SERPL CALCULATED.3IONS-SCNC: 8 MMOL/L (ref 3–14)
AST SERPL W P-5'-P-CCNC: 25 U/L (ref 0–45)
BASOPHILS # BLD AUTO: 0 10E3/UL (ref 0–0.2)
BASOPHILS NFR BLD AUTO: 1 %
BILIRUB SERPL-MCNC: 0.2 MG/DL (ref 0.2–1.3)
BUN SERPL-MCNC: 10 MG/DL (ref 7–30)
CALCIUM SERPL-MCNC: 8.6 MG/DL (ref 8.5–10.1)
CHLORIDE BLD-SCNC: 104 MMOL/L (ref 94–109)
CO2 SERPL-SCNC: 27 MMOL/L (ref 20–32)
CREAT SERPL-MCNC: 0.71 MG/DL (ref 0.52–1.04)
DHEA-S SERPL-MCNC: <15 UG/DL (ref 35–430)
EOSINOPHIL # BLD AUTO: 0.1 10E3/UL (ref 0–0.7)
EOSINOPHIL NFR BLD AUTO: 2 %
ERYTHROCYTE [DISTWIDTH] IN BLOOD BY AUTOMATED COUNT: 12.9 % (ref 10–15)
GFR SERPL CREATININE-BSD FRML MDRD: >90 ML/MIN/1.73M2
GLUCOSE BLD-MCNC: 88 MG/DL (ref 70–99)
HCT VFR BLD AUTO: 41.7 % (ref 35–47)
HGB BLD-MCNC: 13.6 G/DL (ref 11.7–15.7)
IMM GRANULOCYTES # BLD: 0 10E3/UL
IMM GRANULOCYTES NFR BLD: 1 %
LYMPHOCYTES # BLD AUTO: 1.1 10E3/UL (ref 0.8–5.3)
LYMPHOCYTES NFR BLD AUTO: 18 %
Lab: 401
MCH RBC QN AUTO: 30 PG (ref 26.5–33)
MCHC RBC AUTO-ENTMCNC: 32.6 G/DL (ref 31.5–36.5)
MCV RBC AUTO: 92 FL (ref 78–100)
MONOCYTES # BLD AUTO: 0.3 10E3/UL (ref 0–1.3)
MONOCYTES NFR BLD AUTO: 6 %
NEUTROPHILS # BLD AUTO: 4.4 10E3/UL (ref 1.6–8.3)
NEUTROPHILS NFR BLD AUTO: 72 %
NRBC # BLD AUTO: 0 10E3/UL
NRBC BLD AUTO-RTO: 0 /100
PERFORMING LABORATORY: NORMAL
PLATELET # BLD AUTO: 185 10E3/UL (ref 150–450)
POTASSIUM BLD-SCNC: 3.8 MMOL/L (ref 3.4–5.3)
PROT SERPL-MCNC: 6.2 G/DL (ref 6.8–8.8)
RBC # BLD AUTO: 4.53 10E6/UL (ref 3.8–5.2)
SODIUM SERPL-SCNC: 139 MMOL/L (ref 133–144)
SPECIMEN STATUS: NORMAL
T4 FREE SERPL-MCNC: 1.11 NG/DL (ref 0.76–1.46)
TEST NAME: NORMAL
TSH SERPL DL<=0.005 MIU/L-ACNC: 0.29 MU/L (ref 0.4–4)
WBC # BLD AUTO: 6 10E3/UL (ref 4–11)

## 2022-02-08 PROCEDURE — 99000 SPECIMEN HANDLING OFFICE-LAB: CPT | Performed by: PATHOLOGY

## 2022-02-08 PROCEDURE — 84439 ASSAY OF FREE THYROXINE: CPT | Performed by: PATHOLOGY

## 2022-02-08 PROCEDURE — 74177 CT ABD & PELVIS W/CONTRAST: CPT | Performed by: RADIOLOGY

## 2022-02-08 PROCEDURE — 80375 DRUG/SUBSTANCE NOS 1-3: CPT | Performed by: PATHOLOGY

## 2022-02-08 PROCEDURE — 36415 COLL VENOUS BLD VENIPUNCTURE: CPT | Performed by: PATHOLOGY

## 2022-02-08 PROCEDURE — 82024 ASSAY OF ACTH: CPT | Mod: 90 | Performed by: PATHOLOGY

## 2022-02-08 PROCEDURE — 82627 DEHYDROEPIANDROSTERONE: CPT | Mod: 90 | Performed by: PATHOLOGY

## 2022-02-08 PROCEDURE — 80050 GENERAL HEALTH PANEL: CPT | Performed by: PATHOLOGY

## 2022-02-08 PROCEDURE — 71260 CT THORAX DX C+: CPT | Performed by: RADIOLOGY

## 2022-02-08 RX ORDER — IOPAMIDOL 755 MG/ML
96 INJECTION, SOLUTION INTRAVASCULAR ONCE
Status: COMPLETED | OUTPATIENT
Start: 2022-02-08 | End: 2022-02-08

## 2022-02-08 RX ADMIN — IOPAMIDOL 96 ML: 755 INJECTION, SOLUTION INTRAVASCULAR at 10:27

## 2022-02-09 ENCOUNTER — MYC MEDICAL ADVICE (OUTPATIENT)
Dept: FAMILY MEDICINE | Facility: CLINIC | Age: 37
End: 2022-02-09

## 2022-02-09 NOTE — LETTER
February 9, 2022      Suzy Rodríguez  [address]  [facility] [address]              To whom it may concern,    Suzy is my patient at the [facility].  I believe that she has complete capability of operating a motor vehicle in accordance to her 's license.  Please contact me if there are concerns.        Sincerely,      Ivette Leal MD

## 2022-02-10 ENCOUNTER — TELEPHONE (OUTPATIENT)
Dept: ENDOCRINOLOGY | Facility: CLINIC | Age: 37
End: 2022-02-10

## 2022-02-10 LAB — ACTH PLAS-MCNC: 11 PG/ML

## 2022-02-10 RX ORDER — LEVOTHYROXINE SODIUM 125 UG/1
125 TABLET ORAL DAILY
Qty: 90 TABLET | Refills: 0 | Status: SHIPPED | OUTPATIENT
Start: 2022-02-10 | End: 2022-04-09

## 2022-02-10 NOTE — TELEPHONE ENCOUNTER
LEVOTHYROXINE SODIUM 125MCG TABS      Last Written Prescription Date:  2-23-21  Last Fill Quantity: 90,   # refills: 3  Last Office Visit : 12-21-20 ( RTC 6 M)  Future Office visit:  none    TSH   Date Value Ref Range Status   02/08/2022 0.29 (L) 0.40 - 4.00 mU/L Final   07/05/2021 0.22 (L) 0.40 - 4.00 mU/L Final     Note on Rx: Patient taking differently: Take 125 mcg by mouth daily On Sunday's - 250 mg    Routing refill request to provider for review/approval because:  Abn TSH  ? dosage    Scheduling has been notified to contact the pt for appointment.

## 2022-02-16 ENCOUNTER — VIRTUAL VISIT (OUTPATIENT)
Dept: ONCOLOGY | Facility: CLINIC | Age: 37
End: 2022-02-16
Attending: INTERNAL MEDICINE
Payer: COMMERCIAL

## 2022-02-16 DIAGNOSIS — C74.01 MALIGNANT NEOPLASM OF CORTEX OF RIGHT ADRENAL GLAND (H): Primary | ICD-10-CM

## 2022-02-16 DIAGNOSIS — F41.9 ANXIETY: ICD-10-CM

## 2022-02-16 DIAGNOSIS — G47.33 OSA (OBSTRUCTIVE SLEEP APNEA): ICD-10-CM

## 2022-02-16 DIAGNOSIS — F40.240 CLAUSTROPHOBIA: ICD-10-CM

## 2022-02-16 DIAGNOSIS — F51.01 PRIMARY INSOMNIA: ICD-10-CM

## 2022-02-16 PROCEDURE — 99213 OFFICE O/P EST LOW 20 MIN: CPT | Mod: 95 | Performed by: INTERNAL MEDICINE

## 2022-02-16 NOTE — PROGRESS NOTES
"  [facility] Oncology Followup  Feb 16, 2022     REFERRING PROVIDER: Warren Mansfield MD from [facility].      REASON FOR CURRENT VISIT: Follow-up for adrenocortical carcinoma,    ONCOLOGIC HISTORY:  1. 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   - CT abdomen and pelvis stone protocol without contrast [id number] [id number] showed \"9.2 x 8 cm heterogeneous right upper quadrant mass with small foci of calcification within it which is likely arising from the adrenal gland though inseparable from liver and upper pole of right kidney. It is incompletely evaluated on this noncontrast study. 3 x 2.6 cm mass right lobe of liver on image #85 also incompletely evaluated. 6 x 4 x 4 mm upper third left ureteral obstructing stone with mild to moderate secondary hydronephrosis. Collection of stones at lower pole left kidney extending over an area of approximately 6 x 7 x 4 mm. Additional nonobstructing 1 mm stone upper pole left kidney. 2 mm nonobstructing stone noted upper pole right kidney with no hydronephrosis on the right. Postop change consistent with gastric bypass. Noncontrast images of spleen, left adrenal gland, gallbladder, and pancreas unremarkable. No aneurysm. No adenopathy.\"  - Seen by Dr. Delvalle at [facility] clinic. Referred to Dr. Alvino Patel and then Dr. Warren Mansfield.  - Endocrinology team directed workup showed high DHEAS level of 740 pre-surgery.   - Right open adrenalectomy and hepatic mobilization performed by Dr. Warren Mansfield on 6/25/2018. Pathology showed adrenocortical carcinoma measuring 11.3 cm, weighing 413 g with extension into or through the adrenal capsule negative lymphovascular invasion, tumor necrosis present, margins involved by tumor, no regional lymph nodes identified, " pathologic stage T2 NX.  pathology review reveals low grade ACC.  - DHEAS normalized postsurgery.   - Adjuvant Mitotane started on 7/24/18. Dose increased to 2000mg TID around 10/23/18 due to persistently low troughs. Held 1/16/19 for two weeks and resumed 1/30 at 1000 mg po BID due to very poor tolerance of higher doses. Highest mitotane level was 10.2 on 2/11/19. Mitotane troughs did not rise above 10 despite increase in dose to 1500 BID and then 1500+2000. Started 2000mg BID on 10/28/20. Increased to [id number] since around Christmas 2020.   - Saw radiation oncology at [facility], radiation oncology at [facility], as well as endocrine oncology (Dr. Huertas) at [facility].   - S/p adjuvant RT by Dr. Monsivais - 5040 cGy over 30 fr (8/24/18-10/6/18).  - 2/11/19: OSH CT C/A/P and MRI brain with contrast - no evidence of disease recurrence.   - 06/08/20, 09/14/20, 12/7/20, 3/16/2021: CT C/A/P with contrast - AFUA.    INTERVAL HISTORY:  Ms. Rodríguez is a 36 year old woman with history notable for right-sided functioning adrenocortical carcinoma (diagnosed in May 2018), who was  on adjuvant mitotane until July 2021.     She is being followed over a video visit.    She has been off mitotane now for over 6 months.  She could not keep up with her job.  She admits that her job was not very physical.  Still she could not keep up for 40 hours a week.  She has difficulty with her memory.  She continues to have some headaches.  She has discontinued her Adderall, smoking and drinking.  She suspects that her sleep apnea could be contributing to her memory issue and headaches.  She was previously diagnosed with sleep apnea and has been using a BiPAP machine which she has quit a while ago.      She is being seen with labs and restaging scans for a scheduled follow-up visit.    REVIEW OF SYSTEMS: 14 point ROS negative other than the symptoms noted above in the HPI.    PAST MEDICAL HISTORY:  Past  "Medical History:   Diagnosis Date     Adrenal cortex cancer, right (H) 6/30/2018    Resected 6/25/18, Dr. Mansfield.     Adrenal mass (H)      Anemia     thought due to heavy menses.     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  Check in May. SCP started.  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     SHREYA on CPAP 12/17/2015    Stopped using CPAP the Fall of 2016 after weight loss as even at lower pressure/adjustment couldn't tolerate the cpap. Resting well, cautioned to watch for any signs of fatigue and consider \"titration study\" in the future to see if cpap is still required at her new weight.     Pneumonia     hx of recurrent pneumonia issues/respiratory infections.     Polycystic ovary syndrome     able to get pregnant, not on meds.     Pulmonary embolism (H) 2009 or so    briefly on wafarin.     Sleep apnea     cpap     Vitamin D deficiency    MHTFR mutation with h/o mutiple miscarriages.    PAST SURGICAL HISTORY:   Past Surgical History:   Procedure Laterality Date     ADRENALECTOMY Right 6/25/2018    Procedure: ADRENALECTOMY;  Right Adrenalectomy,  Embolize Liver , Anesthesia Block;  Surgeon: Warren Mansfield MD;  " Location: [facility] OR     ADRENALECTOMY       ADRENALECTOMY Right       SECTION      twice      SECTION      2      SECTION      x2     EMBOLECTOMY ABDOMEN N/A 2018    Procedure: EMBOLECTOMY ABDOMEN;;  Surgeon: Ector Mcintyre MD;  Location: [facility] OR     EXTRACORPOREAL SHOCK WAVE LITHOTRIPSY (ESWL)       GASTRIC BYPASS       IL CYSTO/URETERO W/LITHOTRIPSY &INDWELL STENT INSRT Left 2018    Procedure: CYSTOSCOPY, LEFT URETEROSCOPY LASER LITHOTRIPSY STENT INSERTION;  Surgeon: Skyler Delvalle MD;  Location: [facility];  Service: Urology     IL LAP GASTRIC BYPASS/DERICK-EN-Y N/A 2016    Procedure: GASTRIC BYPASS LAPAROSCOPIC DERICK-EN-Y;  Surgeon: Randy Sutherland MD;  Location: [facility];  Service: General     TUBAL LIGATION       WISDOM TOOTH EXTRACTION Bilateral       SOCIAL HISTORY:   Social History     Tobacco Use     Smoking status: Current Every Day Smoker     Smokeless tobacco: Never Used     Tobacco comment: vapes   Vaping Use     Vaping Use: Never used   Substance Use Topics     Alcohol use: Yes     Comment: occ.     Drug use: No     FAMILY HISTORY:   Family History   Problem Relation Age of Onset     Depression Paternal Grandmother      Diabetes Mother      Diabetes Father      Hypertension Father      Chronic Obstructive Pulmonary Disease Father      Cancer Maternal Grandmother         gastric     Urolithiasis Maternal Grandmother      Heart Disease Maternal Grandfather      Cancer Maternal Grandfather         lung     Heart Disease Paternal Grandfather      Breast Cancer No family hx of      Colon Cancer No family hx of      Prostate Cancer No family hx of      Cerebrovascular Disease No family hx of      Clotting Disorder No family hx of      Gout No family hx of      ALLERGIES:   Allergies   Allergen Reactions     Bee Venom Swelling     Ibuprofen Hives and Swelling     CURRENT MEDICATIONS:   Current Outpatient Medications   Medication Instructions      acetaminophen (TYLENOL) 650 mg, Oral, EVERY 4 HOURS PRN     buPROPion (WELLBUTRIN SR) 150 mg, Oral     calcitRIOL (ROCALTROL) 0.5 MCG capsule No dose, route, or frequency recorded.     calcium carbonate 500 mg (elemental) (OSCAL) 500 mg, Oral, 2 TIMES DAILY     clonazePAM (KLONOPIN) 0.5 mg, Oral, 2 TIMES DAILY PRN     diazepam (VALIUM) 5 mg, Oral, ONCE PRN     fludrocortisone (FLORINEF) 0.2 mg, Oral, DAILY     FLUoxetine (PROZAC) 60 mg, Oral, DAILY     gabapentin (NEURONTIN) 900 mg, Oral, 3 TIMES DAILY     hydrocortisone (CORTEF) 10 MG tablet Take 3 tablets (30 MG) every morning and take 2 tablets (20 MG) by mouth every day at 2 PM. Additional as directed.     levothyroxine (SYNTHROID/LEVOTHROID) 125 mcg, Oral, DAILY     ondansetron (ZOFRAN) 8 mg, Oral, EVERY 8 HOURS PRN     prochlorperazine (COMPAZINE) 5 mg, Oral, EVERY 8 HOURS PRN     temazepam (RESTORIL) 15 mg, Oral, AT BEDTIME PRN     UNABLE TO FIND Other, medical cannabis (Patient's own supply. Not a prescription)       PHYSICAL EXAMINATION:  Vital signs: LMP 01/18/2022    Gen: NAD, on video no distress  No other exam    Recent Labs   Lab Test 02/08/22 0903 01/18/22  1607 12/31/21  1348 10/25/21  0919 09/03/21  0806    138 137 139 140   POTASSIUM 3.8 4.4 4.5 4.3 3.9   CHLORIDE 104 107 105 104 107   CO2 27 27 30 23 28   ANIONGAP 8 4 2* 12 5   BUN 10 9 7 9 9   CR 0.71 0.74 0.63 0.75 0.79   GLC 88 87 131* 84 92   SHASHA 8.6 8.5 8.6 8.7 7.9*     No results for input(s): MAG, PHOS in the last 60412 hours.  Recent Labs   Lab Test 02/08/22  0903 01/18/22  1607 12/31/21  1348 10/25/21  0919 09/03/21  0806 08/10/21  1051 08/07/21  2244 07/05/21  0741   WBC 6.0 6.3 5.2 4.2 5.9 4.8   < > 4.2   HGB 13.6 13.8 13.8 14.1 14.1 15.1   < > 15.0    252 166 201 148* 110*   < > 107*   MCV 92 91 95 94 95 97   < > 97   NEUTROPHIL 72  --  79 68  --  69  --  46.3    < > = values in this interval not displayed.     Recent Labs   Lab Test 02/08/22  0903 01/18/22  1609  12/31/21  1348   BILITOTAL 0.2 0.3 0.3   ALKPHOS 118 126 144   ALT 32 33 33   AST 25 25 29   ALBUMIN 3.4 3.4 3.1*     TSH   Date Value Ref Range Status   02/08/2022 0.29 (L) 0.40 - 4.00 mU/L Final   01/18/2022 0.72 0.40 - 4.00 mU/L Final   12/31/2021 0.27 (L) 0.40 - 4.00 mU/L Final   07/05/2021 0.22 (L) 0.40 - 4.00 mU/L Final   06/04/2021 0.44 0.40 - 4.00 mU/L Final   05/10/2021 0.18 (L) 0.40 - 4.00 mU/L Final     No results for input(s): CEA in the last 23989 hours.  Results for orders placed or performed in visit on 02/08/22   CT Chest/Abdomen/Pelvis w Contrast    Narrative    EXAMINATION: CT CHEST/ABDOMEN/PELVIS W CONTRAST 2/8/2022 10:49 AM    TECHNIQUE: Helical CT images from the thoracic inlet through the  symphysis pubis were obtained with contrast. Contrast dose: Isovue 370  96 mL    COMPARISON: 11/15/2021, 12/7/2020, 11/23/2018, 7/23/2018    HISTORY: Adrenal carcinoma surveillance post adjuvant mitotane.    FINDINGS:    Chest:   Tiny hypodense nodule in the superior right thyroid lobe. The aortic  branching pattern, heart size, pericardium, and esophagus are normal.  The ascending aorta and main pulmonary artery are not dilated. No  large central pulmonary embolism. No thoracic adenopathy.    The central tracheobronchial tree is patent. No pneumothorax, pleural  effusion, or airspace consolidation. Triangular fissural lymph node  along the left major fissure is unchanged. No suspicious pulmonary  nodules. Radiation fibrosis in the right lower lobe, unchanged.    Abdomen and pelvis:   Unchanged benign hemangioma in hepatic segment 5. No new liver lesion.  The gallbladder, biliary tree, pancreas, left adrenal gland, left  kidney, and urinary bladder appear normal. The spleen remains mildly  prominent. Postsurgical changes of right adrenalectomy without  abnormal soft tissue or enhancement in the operative bed. Unchanged  focal atrophy and hypoenhancement in the superior pole of the right  kidney, consistent  with postradiation change. Appropriately positioned  intrauterine device within the otherwise unremarkable uterus. Right  ovarian corpus luteum. A cyst in the left ovary measures 3.1 cm (a  similar 3.1 cm cyst was seen in the left ovary on 11/15/2021).    No intra-abdominal free air or free fluid. Unremarkable postsurgical  changes of Tanika-en-Y gastric bypass. No abnormally dilated loops of  bowel. Moderate stool burden. Normal appendix. A polypoid focus within  the duodenal bulb measuring 11 mm is not significantly changed since  2018 (series 6, image 30). This is more conspicuous than on previous  exams due to transient fluid distention of the duodenal bulb. The  major abdominal vasculature is patent. No lymphadenopathy in the  abdomen or pelvis.    Bones and soft tissues:   No acute or worrisome osseous lesions.        Impression    IMPRESSION:   1. No evidence of local recurrence or metastatic disease.  2. Appropriately positioned intrauterine device.  3. Polypoid intraluminal focus in the duodenal bulb is unchanged since  2018, presumably representing a benign duodenal polyp. Attention on  follow-up.    ARCHANA LAO MD         SYSTEM ID:  [id number]     *Note: Due to a large number of results and/or encounters for the requested time period, some results have not been displayed. A complete set of results can be found in Results Review.        ASSESSMENT AND PLAN: A 36 year old  female with right-sided functioning high-risk adrenocortical carcinoma.     Adrenocortical carcinoma:  - Started on adjuvant mitotane in July 2018 based on her presentation and tumor characteristics including invasion of the adrenal capsule, high mitotic rate, possibly positive margins, and high Ki67, which could result in a rate of recurrence as high as 40%.    The mitotane was stopped by Dr. Liang and endocrinologic oncologist from [facility] in May 2021.  The plan has been to continue with mitotane levels every month and  interval imaging every 3 months for now .  --We will continue with scans in three months for now and they will be spaced out to every four months some time next year.  --will continue to follow with Dr Liang ([facility]).    We will continue to hydrocortisone at 15/10.  She continues to see declines in the mitotane serum levels off therapy.  Her most recent mitotane levels are pending at this time.  She is weaning from Florinef and blood pressures have been normal  Mild neuropathy pain,      She is having difficulty with her memory.  She has fatigue and unable to keep up with her job.  She had to quit her job last week.  She suspects that her sleep apnea could be contributing.  She had previous obstructive sleep apnea and had been on CPAP.  I will order for a new sleep study for her.     She wondered if she could take Adderall intermittently for ADHD which she has stopped recently.  She is also quit alcohol and smoking altogether.  I did admit that I have limited experience treating ADHD and with this particular medication.  I am not sure if it would be effective or even helpful when taken intermittently as needed medication.     I would recommend that she participate in some form of physical activity that she enjoys 3-5 times a week.  I think it will help her get back into shape and in her rhythm.  It will help her memory and her energy.    I will see her again in 3 months with labs and restaging scans few days prior to clinic visit.    Video-Visit Details    Type of service:  Video Visit  Originating Location (pt. Location): Home  Distant Location (provider location):  [facility]   Platform used for Video Visit: ActionFlow    25 minutes spent on the date of the encounter doing chart review, history and exam, documentation and further activities as noted above      Tru Hernadez    Hematologist and Medical Oncologist  [facility]

## 2022-02-16 NOTE — LETTER
"    2/16/2022         RE: Suzy Rodríguez  [address]  [facility] [address]        Dear Colleague,    Thank you for referring your patient, Suzy Rodríguez, to the [facility]. Please see a copy of my visit note below.    Suzy is a 36 year old who is being evaluated via a billable video visit.      How would you like to obtain your AVS? MyChart  If the video visit is dropped, the invitation should be resent by: Text to cell phone: [phone]  Will anyone else be joining your video visit? No    Ena Corrales [facility] Oncology Followup  Feb 16, 2022     REFERRING PROVIDER: Warren Mansfield MD from [facility].      REASON FOR CURRENT VISIT: Follow-up for adrenocortical carcinoma,    ONCOLOGIC HISTORY:  1. 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   - CT abdomen and pelvis stone protocol without contrast 5 5939 showed \"9.2 x 8 cm heterogeneous right upper quadrant mass with small foci of calcification within it which is likely arising from the adrenal gland though inseparable from liver and upper pole of right kidney. It is incompletely evaluated on this noncontrast study. 3 x 2.6 cm mass right lobe of liver on image #85 also incompletely evaluated. 6 x 4 x 4 mm upper third left ureteral obstructing stone with mild to moderate secondary hydronephrosis. Collection of stones at lower pole left kidney extending over an area of approximately 6 x 7 x 4 mm. Additional nonobstructing 1 mm stone upper pole left kidney. 2 mm nonobstructing stone noted upper pole right kidney with no hydronephrosis on the right. Postop change consistent with gastric bypass. Noncontrast images of spleen, left adrenal gland, gallbladder, and pancreas unremarkable. No aneurysm. No " "adenopathy.\"  - Seen by Dr. Delvalle at [facility] clinic. Referred to Dr. Alvino Patel and then Dr. Warren Mansfield.  - Endocrinology team directed workup showed high DHEAS level of 740 pre-surgery.   - Right open adrenalectomy and hepatic mobilization performed by Dr. Warren Mansfield on 6/25/2018. Pathology showed adrenocortical carcinoma measuring 11.3 cm, weighing 413 g with extension into or through the adrenal capsule negative lymphovascular invasion, tumor necrosis present, margins involved by tumor, no regional lymph nodes identified, pathologic stage T2 NX.  pathology review reveals low grade ACC.  - DHEAS normalized postsurgery.   - Adjuvant Mitotane started on 7/24/18. Dose increased to 2000mg TID around 10/23/18 due to persistently low troughs. Held 1/16/19 for two weeks and resumed 1/30 at 1000 mg po BID due to very poor tolerance of higher doses. Highest mitotane level was 10.2 on 2/11/19. Mitotane troughs did not rise above 10 despite increase in dose to 1500 BID and then 1500+2000. Started 2000mg BID on 10/28/20. Increased to [phone] since around Christmas 2020.   - Saw radiation oncology at [facility], radiation oncology at [facility], as well as endocrine oncology (Dr. Huertas) at [facility].   - S/p adjuvant RT by Dr. Monsivais - 5040 cGy over 30 fr (8/24/18-10/6/18).  - 2/11/19: OSH CT C/A/P and MRI brain with contrast - no evidence of disease recurrence.   - 06/08/20, 09/14/20, 12/7/20, 3/16/2021: CT C/A/P with contrast - AFUA.    INTERVAL HISTORY:  Ms. Rodríguez is a 36 year old woman with history notable for right-sided functioning adrenocortical carcinoma (diagnosed in May 2018), who was  on adjuvant mitotane until July 2021.     She is being followed over a video visit.    She has been off mitotane now for over 6 months.  She could not keep up with her job.  She admits that her job was not very physical.  Still she could not keep up for " "40 hours a week.  She has difficulty with her memory.  She continues to have some headaches.  She has discontinued her Adderall, smoking and drinking.  She suspects that her sleep apnea could be contributing to her memory issue and headaches.  She was previously diagnosed with sleep apnea and has been using a BiPAP machine which she has quit a while ago.      She is being seen with labs and restaging scans for a scheduled follow-up visit.    REVIEW OF SYSTEMS: 14 point ROS negative other than the symptoms noted above in the HPI.    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 Mansfield.     Adrenal mass (H)      Anemia     thought due to heavy menses.     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  Check in May. SCP started.  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     SHREYA on CPAP 12/17/2015    Stopped using CPAP the Fall of 2016 after weight loss as even at lower pressure/adjustment couldn't tolerate the cpap. Resting well, cautioned to watch for any signs of fatigue and consider \"titration study\" in the future to see if cpap is still " required at her new weight.     Pneumonia     hx of recurrent pneumonia issues/respiratory infections.     Polycystic ovary syndrome     able to get pregnant, not on meds.     Pulmonary embolism (H)  or so    briefly on wafarin.     Sleep apnea     cpap     Vitamin D deficiency    MHTFR mutation with h/o mutiple miscarriages.    PAST SURGICAL HISTORY:   Past Surgical History:   Procedure Laterality Date     ADRENALECTOMY Right 2018    Procedure: ADRENALECTOMY;  Right Adrenalectomy,  Embolize Liver , Anesthesia Block;  Surgeon: Warren Mansfield MD;  Location: [facility] OR     ADRENALECTOMY       ADRENALECTOMY Right       SECTION      twice      SECTION      2      SECTION      x2     EMBOLECTOMY ABDOMEN N/A 2018    Procedure: EMBOLECTOMY ABDOMEN;;  Surgeon: Ector Mcintyre MD;  Location: [facility] OR     EXTRACORPOREAL SHOCK WAVE LITHOTRIPSY (ESWL)       GASTRIC BYPASS       NY CYSTO/URETERO W/LITHOTRIPSY &INDWELL STENT INSRT Left 2018    Procedure: CYSTOSCOPY, LEFT URETEROSCOPY LASER LITHOTRIPSY STENT INSERTION;  Surgeon: Skyler Delvalle MD;  Location: [facility];  Service: Urology     NY LAP GASTRIC BYPASS/DERICK-EN-Y N/A 2016    Procedure: GASTRIC BYPASS LAPAROSCOPIC DERICK-EN-Y;  Surgeon: Randy Sutherland MD;  Location: [facility];  Service: General     TUBAL LIGATION       WISDOM TOOTH EXTRACTION Bilateral       SOCIAL HISTORY:   Social History     Tobacco Use     Smoking status: Current Every Day Smoker     Smokeless tobacco: Never Used     Tobacco comment: vapes   Vaping Use     Vaping Use: Never used   Substance Use Topics     Alcohol use: Yes     Comment: occ.     Drug use: No     FAMILY HISTORY:   Family History   Problem Relation Age of Onset     Depression Paternal Grandmother      Diabetes Mother      Diabetes Father      Hypertension Father      Chronic Obstructive Pulmonary Disease Father      Cancer Maternal Grandmother         gastric      Urolithiasis Maternal Grandmother      Heart Disease Maternal Grandfather      Cancer Maternal Grandfather         lung     Heart Disease Paternal Grandfather      Breast Cancer No family hx of      Colon Cancer No family hx of      Prostate Cancer No family hx of      Cerebrovascular Disease No family hx of      Clotting Disorder No family hx of      Gout No family hx of      ALLERGIES:   Allergies   Allergen Reactions     Bee Venom Swelling     Ibuprofen Hives and Swelling     CURRENT MEDICATIONS:   Current Outpatient Medications   Medication Instructions     acetaminophen (TYLENOL) 650 mg, Oral, EVERY 4 HOURS PRN     buPROPion (WELLBUTRIN SR) 150 mg, Oral     calcitRIOL (ROCALTROL) 0.5 MCG capsule No dose, route, or frequency recorded.     calcium carbonate 500 mg (elemental) (OSCAL) 500 mg, Oral, 2 TIMES DAILY     clonazePAM (KLONOPIN) 0.5 mg, Oral, 2 TIMES DAILY PRN     diazepam (VALIUM) 5 mg, Oral, ONCE PRN     fludrocortisone (FLORINEF) 0.2 mg, Oral, DAILY     FLUoxetine (PROZAC) 60 mg, Oral, DAILY     gabapentin (NEURONTIN) 900 mg, Oral, 3 TIMES DAILY     hydrocortisone (CORTEF) 10 MG tablet Take 3 tablets (30 MG) every morning and take 2 tablets (20 MG) by mouth every day at 2 PM. Additional as directed.     levothyroxine (SYNTHROID/LEVOTHROID) 125 mcg, Oral, DAILY     ondansetron (ZOFRAN) 8 mg, Oral, EVERY 8 HOURS PRN     prochlorperazine (COMPAZINE) 5 mg, Oral, EVERY 8 HOURS PRN     temazepam (RESTORIL) 15 mg, Oral, AT BEDTIME PRN     UNABLE TO FIND Other, medical cannabis (Patient's own supply. Not a prescription)       PHYSICAL EXAMINATION:  Vital signs: LMP 01/18/2022    Gen: NAD, on video no distress  No other exam    Recent Labs   Lab Test 02/08/22  0903 01/18/22  1607 12/31/21  1348 10/25/21  0919 09/03/21  0806    138 137 139 140   POTASSIUM 3.8 4.4 4.5 4.3 3.9   CHLORIDE 104 107 105 104 107   CO2 27 27 30 23 28   ANIONGAP 8 4 2* 12 5   BUN 10 9 7 9 9   CR 0.71 0.74 0.63 0.75 0.79   GLC  88 87 131* 84 92   SHASHA 8.6 8.5 8.6 8.7 7.9*     No results for input(s): MAG, PHOS in the last 41501 hours.  Recent Labs   Lab Test 02/08/22  0903 01/18/22  1607 12/31/21  1348 10/25/21  0919 09/03/21  0806 08/10/21  1051 08/07/21  2244 07/05/21  0741   WBC 6.0 6.3 5.2 4.2 5.9 4.8   < > 4.2   HGB 13.6 13.8 13.8 14.1 14.1 15.1   < > 15.0    252 166 201 148* 110*   < > 107*   MCV 92 91 95 94 95 97   < > 97   NEUTROPHIL 72  --  79 68  --  69  --  46.3    < > = values in this interval not displayed.     Recent Labs   Lab Test 02/08/22  0903 01/18/22  1607 12/31/21  1348   BILITOTAL 0.2 0.3 0.3   ALKPHOS 118 126 144   ALT 32 33 33   AST 25 25 29   ALBUMIN 3.4 3.4 3.1*     TSH   Date Value Ref Range Status   02/08/2022 0.29 (L) 0.40 - 4.00 mU/L Final   01/18/2022 0.72 0.40 - 4.00 mU/L Final   12/31/2021 0.27 (L) 0.40 - 4.00 mU/L Final   07/05/2021 0.22 (L) 0.40 - 4.00 mU/L Final   06/04/2021 0.44 0.40 - 4.00 mU/L Final   05/10/2021 0.18 (L) 0.40 - 4.00 mU/L Final     No results for input(s): CEA in the last 78884 hours.  Results for orders placed or performed in visit on 02/08/22   CT Chest/Abdomen/Pelvis w Contrast    Narrative    EXAMINATION: CT CHEST/ABDOMEN/PELVIS W CONTRAST 2/8/2022 10:49 AM    TECHNIQUE: Helical CT images from the thoracic inlet through the  symphysis pubis were obtained with contrast. Contrast dose: Isovue 370  96 mL    COMPARISON: 11/15/2021, 12/7/2020, 11/23/2018, 7/23/2018    HISTORY: Adrenal carcinoma surveillance post adjuvant mitotane.    FINDINGS:    Chest:   Tiny hypodense nodule in the superior right thyroid lobe. The aortic  branching pattern, heart size, pericardium, and esophagus are normal.  The ascending aorta and main pulmonary artery are not dilated. No  large central pulmonary embolism. No thoracic adenopathy.    The central tracheobronchial tree is patent. No pneumothorax, pleural  effusion, or airspace consolidation. Triangular fissural lymph node  along the left major  fissure is unchanged. No suspicious pulmonary  nodules. Radiation fibrosis in the right lower lobe, unchanged.    Abdomen and pelvis:   Unchanged benign hemangioma in hepatic segment 5. No new liver lesion.  The gallbladder, biliary tree, pancreas, left adrenal gland, left  kidney, and urinary bladder appear normal. The spleen remains mildly  prominent. Postsurgical changes of right adrenalectomy without  abnormal soft tissue or enhancement in the operative bed. Unchanged  focal atrophy and hypoenhancement in the superior pole of the right  kidney, consistent with postradiation change. Appropriately positioned  intrauterine device within the otherwise unremarkable uterus. Right  ovarian corpus luteum. A cyst in the left ovary measures 3.1 cm (a  similar 3.1 cm cyst was seen in the left ovary on 11/15/2021).    No intra-abdominal free air or free fluid. Unremarkable postsurgical  changes of Tanika-en-Y gastric bypass. No abnormally dilated loops of  bowel. Moderate stool burden. Normal appendix. A polypoid focus within  the duodenal bulb measuring 11 mm is not significantly changed since  2018 (series 6, image 30). This is more conspicuous than on previous  exams due to transient fluid distention of the duodenal bulb. The  major abdominal vasculature is patent. No lymphadenopathy in the  abdomen or pelvis.    Bones and soft tissues:   No acute or worrisome osseous lesions.        Impression    IMPRESSION:   1. No evidence of local recurrence or metastatic disease.  2. Appropriately positioned intrauterine device.  3. Polypoid intraluminal focus in the duodenal bulb is unchanged since  2018, presumably representing a benign duodenal polyp. Attention on  follow-up.    ARCHANA LAO MD         SYSTEM ID:  [id number]     *Note: Due to a large number of results and/or encounters for the requested time period, some results have not been displayed. A complete set of results can be found in Results Review.        ASSESSMENT  AND PLAN: A 36 year old  female with right-sided functioning high-risk adrenocortical carcinoma.     Adrenocortical carcinoma:  - Started on adjuvant mitotane in July 2018 based on her presentation and tumor characteristics including invasion of the adrenal capsule, high mitotic rate, possibly positive margins, and high Ki67, which could result in a rate of recurrence as high as 40%.    The mitotane was stopped by Dr. Liang and endocrinologic oncologist from [facility] in May 2021.  The plan has been to continue with mitotane levels every month and interval imaging every 3 months for now .  --We will continue with scans in three months for now and they will be spaced out to every four months some time next year.  --will continue to follow with Dr Liang ([facility]).    We will continue to hydrocortisone at 15/10.  She continues to see declines in the mitotane serum levels off therapy.  Her most recent mitotane levels are pending at this time.  She is weaning from Florinef and blood pressures have been normal  Mild neuropathy pain,      She is having difficulty with her memory.  She has fatigue and unable to keep up with her job.  She had to quit her job last week.  She suspects that her sleep apnea could be contributing.  She had previous obstructive sleep apnea and had been on CPAP.  I will order for a new sleep study for her.     She wondered if she could take Adderall intermittently for ADHD which she has stopped recently.  She is also quit alcohol and smoking altogether.  I did admit that I have limited experience treating ADHD and with this particular medication.  I am not sure if it would be effective or even helpful when taken intermittently as needed medication.     I would recommend that she participate in some form of physical activity that she enjoys 3-5 times a week.  I think it will help her get back into shape and in her rhythm.  It will help her memory and her energy.    I  will see her again in 3 months with labs and restaging scans few days prior to clinic visit.    Video-Visit Details    Type of service:  Video Visit  Originating Location (pt. Location): Home  Distant Location (provider location):  [facility]   Platform used for Video Visit: [facility]    25 minutes spent on the date of the encounter doing chart review, history and exam, documentation and further activities as noted above      Tru Hernadez    Hematologist and Medical Oncologist  [facility]          Again, thank you for allowing me to participate in the care of your patient.        Sincerely,        Tru Hernadez MD

## 2022-02-16 NOTE — PROGRESS NOTES
Suzy is a 36 year old who is being evaluated via a billable video visit.      How would you like to obtain your AVS? MyChart  If the video visit is dropped, the invitation should be resent by: Text to cell phone: [phone]  Will anyone else be joining your video visit? [place] EVANGELISTA

## 2022-02-19 LAB — LABCORP INTERFACED MISCELLANEOUS TEST RESULT: NORMAL

## 2022-02-21 PROCEDURE — 82542 COL CHROMOTOGRAPHY QUAL/QUAN: CPT | Mod: 90

## 2022-02-21 PROCEDURE — 82530 CORTISOL FREE: CPT | Mod: 90

## 2022-02-21 PROCEDURE — 99000 SPECIMEN HANDLING OFFICE-LAB: CPT

## 2022-02-23 ENCOUNTER — LAB (OUTPATIENT)
Dept: LAB | Facility: CLINIC | Age: 37
End: 2022-02-23
Payer: COMMERCIAL

## 2022-02-23 DIAGNOSIS — C74.91: ICD-10-CM

## 2022-02-24 ENCOUNTER — TELEPHONE (OUTPATIENT)
Dept: ONCOLOGY | Facility: CLINIC | Age: 37
End: 2022-02-24
Payer: COMMERCIAL

## 2022-02-24 NOTE — PROGRESS NOTES
CLINICAL NUTRITION SERVICES- ONCOLOGY DISTRESS SCREENING     Identified Concern and Score From Distress Screening: This patient has scored >= 6 on Nutrition question 1 and/or 2 on the Oncology Distress Screening questionaire. Nutritionist Referral recommended. See Onc Distress Screening Flowsheet     Date of Distress Screenin/16     Findings:   - Suzy reports that her appetite is much improved. It was decreased due to a medication but she is no longer taking it. Suzy eats 3 meals per day.   - Pt with history of gastric bypass, not taking any vitamins currently.      Intervention: Discussed current PO intake. Encouraged pt to continue eating 3 meals per day. Also encouraged starting a multivitamin given history of gastric bypass.      Education Provided: as above     Follow-up Required: CARLOS Ivory RD, LD  [phone]

## 2022-02-25 LAB
COLLECT DURATION TIME UR: 24 H
CORTIS 24H UR-MRATE: 74 MCG/24 H (ref 3.5–45)
CORTISONE 24H UR-MRATE: 131 MCG/24 H (ref 17–129)
SPECIMEN VOL 24H UR: 2625 ML

## 2022-03-03 ENCOUNTER — VIRTUAL VISIT (OUTPATIENT)
Dept: ENDOCRINOLOGY | Facility: CLINIC | Age: 37
End: 2022-03-03
Payer: COMMERCIAL

## 2022-03-03 DIAGNOSIS — E03.8 SECONDARY HYPOTHYROIDISM: ICD-10-CM

## 2022-03-03 DIAGNOSIS — E27.1 PRIMARY ADRENAL INSUFFICIENCY (H): Primary | ICD-10-CM

## 2022-03-03 DIAGNOSIS — C74.01 ADRENAL CORTEX CANCER, RIGHT (H): ICD-10-CM

## 2022-03-03 DIAGNOSIS — Z98.84 GASTRIC BYPASS STATUS FOR OBESITY: ICD-10-CM

## 2022-03-03 PROCEDURE — 99215 OFFICE O/P EST HI 40 MIN: CPT | Mod: 95 | Performed by: INTERNAL MEDICINE

## 2022-03-03 ASSESSMENT — ENCOUNTER SYMPTOMS
INSOMNIA: 1
NUMBNESS: 0
COUGH DISTURBING SLEEP: 1
POLYDIPSIA: 0
SPEECH CHANGE: 1
TREMORS: 1
SYNCOPE: 1
HALLUCINATIONS: 0
DECREASED APPETITE: 0
INCREASED ENERGY: 1
EXERCISE INTOLERANCE: 0
WEIGHT GAIN: 1
DISTURBANCES IN COORDINATION: 0
LEG PAIN: 0
HYPERTENSION: 0
SHORTNESS OF BREATH: 1
DYSPNEA ON EXERTION: 1
PARALYSIS: 0
SPUTUM PRODUCTION: 0
DEPRESSION: 1
DECREASED CONCENTRATION: 1
SEIZURES: 1
WEIGHT LOSS: 0
HEADACHES: 0
ORTHOPNEA: 1
TINGLING: 1
FEVER: 0
HYPOTENSION: 0
FATIGUE: 1
COUGH: 1
DECREASED LIBIDO: 0
MEMORY LOSS: 0
POSTURAL DYSPNEA: 1
LIGHT-HEADEDNESS: 1
HOT FLASHES: 0
DIZZINESS: 0
PANIC: 1
HEMOPTYSIS: 0
POLYPHAGIA: 1
WHEEZING: 0
ALTERED TEMPERATURE REGULATION: 0
NERVOUS/ANXIOUS: 1
PALPITATIONS: 0
SNORES LOUDLY: 0
SLEEP DISTURBANCES DUE TO BREATHING: 0
LOSS OF CONSCIOUSNESS: 1
NIGHT SWEATS: 0
WEAKNESS: 0
CHILLS: 0

## 2022-03-03 NOTE — LETTER
"3/3/2022       RE: Suzy Rodríguez  [address]  [facility] [address]     Dear Colleague,    Thank you for referring your patient, Suzy Rodríguez, to the [facility] [place] [facility]. Please see a copy of my visit note below.    Video-Visit Details    Type of service:  Video Visit    Video call duration:   Started 10:01 am   Ended: 10:34 am      Originating Location (pt. Location): Home  Distant Location (provider location):  [facility]  Platform used for Video Visit: AmWell/part of the visit was done via phone (~10 minutes) as the audio connection dropped.     The patient is seen in f/up for adrenocortical cancer.     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  The adrenal mass was first identified in May 2018, on an abdominal CT done for evaluation of kidney stones.  The mass measured 9.2 x 8 cm on the noncontrast CT from 5/8/18, and was inseparable from the liver and upper pole of the right kidney.  The chest CT from 6/14/18 identified 2 indeterminate solid pulmonary nodules, with the largest measuring 3 mm in the subpleural posterior left lower lobe.  The patient underwent right adrenalectomy on 6/25/18.  Surgery was complicated by diaphragmatic injury during hepatic mobilization and small fracture of the right liver lobe, managed conservatively.  The pathology report (re read at [facility]) revealed the following:  \"Adrenal cortical carcinoma, low grade, oncocytic type, forming a mass 11.3 cm and weighing 412 grams (per report). Surgical resection margins are positive for tumor (per report). Lymphovascular invasion is focally present. The tumor seems to be confined to the adrenal gland.  AJCC Stage (with available surgical materials): pT2NX (8th edition, 2017). " "Immunoperoxidase stains were performed on paraffin sections at the referring institution and reviewed at [facility] in [place], [place].  Tumor cells are positive for calretinin, synaptophysin and Melan A, supporting the above diagnosis. Ki-67 reveal an increased proliferation index of approximately 20% in the most active areas.\"    Treatment with mitotane was started in July 2018 and she completed radiation to the adrenal bed on October 8, 2018.  Titrating up the dose of mitotane has remained difficult.  She has not been able to attain a serum mitotane level above 10.  Mitotane was discontinued by Dr. Liang from [facility], in May 2021. Her last Mitotane level from 9/3/2021 was 3.6. It was 2.1 on recent labwork from 2/8/22.     The patient feels a lot better since discontinuing the mitotane.  The dose of hydrocortisone was titrated down to 50 mg in the morning and 10 mg at 3 PM, since the fall 2021.  The fludrocortisone up was discontinued shortly after mitotane was stopped.  The current dose of levothyroxine remains unchanged, at 125 mcg daily.  She has a blood pressure cuff at home but she has not been checking her blood pressure.    Clinically, the patient complains of the following symptoms:  Persistent fatigue  Increased hunger and some weight gain over the last month  Difficulty concentrating and memory impairment.  These symptoms have improved since being off mitotane but they are still lingering.  Shortness of breath present at night, when she lies on her back, associated with some dry cough, longstanding.  The cough is not present during daytime.  With exercise, she might develop shortness of breath during daytime.  Episodes of lightheadedness where she feels like passing out and she has to lean herself against the wall and refocus.  The last time she experienced this was a few days ago.  They are relieved by taking big breaths.  On average, they have occurred a couple of times a week and they " are not always related to positional changes.  There are no other associated symptoms.  The hands tremor has significantly improved but is still present.   Tingling in her hands and feet if she sits in a weird position or holding something in her hands for a longer period of time.  For the last 2 months, she has been experiencing questionable hot flashes.  Since having the IUD placed 3 years ago, she did not experience menstrual periods until March 2021.  She now has very light menstrual periods, 4 to 5 weeks apart.    She has resumed her therapy sessions over the last 1 and half months.  A couple of months ago, she started Adderall and she reports feeling overall better since taking it.  hen she developed calcium carbonate 600 mg per 1 table daily     Currently, she does not take any vitamin D or multivitamins.  She has been taking 600 mg calcium daily.  In early January she had an episode which she thinks might have represented a seizure.  It happened shortly after starting Adderall and she was smoking marijuana and drinking at that time.  Paramedics were called and, as per patient, she lost consciousness for approximately 1 hour.  She remembers sweating and falling to the ground.     Most recent abd CT from 2/8/22 unremarkable   Brain MRI from 11/2021 unremarkable     Pertinent data and labs:  5/14/18 normal plasma metanephrines  5/31/18 ALD 13.4, renin 0.8   5/28/18 urinary cortisol 351.6, 8 AM cortisol 23 (after dexamethasone), normal metanephrines     6/5/18 ACTH<10, DHEAS 740 (prior to surgery)  7/30/18  DHEAS <0.5, cortisol AM 19, testosterone <7    7/31/18 aldosterone 7.3  8/10/18 CRH 3 (normal<10)  12/3/18 cortisol PM 36, free T4 0.7, TSH 2.3, Ca 8.6,  (elevated since 11/18)   12/4/18 ACTH <5   12/6/18  DHEAS <0.5  12/20/18 ferritin 9  2/12/19 androstenedione 0.471, K 3.6, ALD 7.7 renin 4.4   2/13/19 24 hrs urinary cortisol 130.9   2/18 started on fludrocortisone at 0.1 mg daily   2/26/19 DHEAS <15    2/26/19 9 AM cortisol 7.4, ACTH 23 (5-52).   3/12/19 DHEAS <15  9/24/2019 potassium 4, sodium 137, renin 10.6, mitotane 18.8, cortisol 25.8, ACTH < 5, aldosterone 4.9, free T4 1.17, TSH 0.07, DHEAS<15 (lab work was done at 9:30 AM, outside records).  6/8/20 DHEAS <15   12/9/20 corrected calcium 8.8, TSH 0.25, free T4 1.03, DHEAS <15, ACTH,10   7/16/20 vitamin D 51, PTH 59      Suzy underwent bariatric surgery in 6/2016. Pre-surgery weight 235 lb, inga post-surgery 135 lbs. Her weight in 4/2017 was 139 lbs. In 3/2018, her weight was 154 lbs. It was 157 lbs prior to adrenalectomy.   Her weight this months was 156 lbs.     Pertinent new labs reviewed:  12/31/21 Aldosterone 12.3, renin 3.2, DHEA-S<15, cortisol level at 2 PM 2.6, ACTH undetectable, Albumin 3.1, calcium 8.6, TSH 0.27, free T4 1.05  1/18/22 TSH 0.72  1/20/22 24-hour urinary cortisol 93, urinary cortisol normal at 110  2/8/22 TSH 0.29, free T4 1.11, DHEA-S<15, ACTH 11, calcium 8.6, Albumin 3.4  2/21/22 24-hour urinary cortisol 74, cortisone 131 ().    Past Medical History   Past Medical History:   Diagnosis Date     Adrenal cortex cancer, right (H) 6/30/2018    Resected 6/25/18, Dr. Mansfield.     Adrenal mass (H)      Anemia     thought due to heavy menses.     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  Check in May. SCP started.  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) June 25, 2018   Cushing's syndrome, secondary to #1 (300 mcg/24 hr); Rx hydrocortisone 20 + 10 post op   Post operative defect right hemidiaphragm with ?worsening right  "effussion, not present preop   Currently on mitotane, 500 mg, BID x 1 week + hydrocortisone     Menstrual disorder     menorrhagia     MTHFR mutation      MTHFR mutation     believed to be homozygous for the mutation.     SHREYA on CPAP 2015    Stopped using CPAP the  after weight loss as even at lower pressure/adjustment couldn't tolerate the cpap. Resting well, cautioned to watch for any signs of fatigue and consider \"titration study\" in the future to see if cpap is still required at her new weight.     Pneumonia     hx of recurrent pneumonia issues/respiratory infections.     Polycystic ovary syndrome     able to get pregnant, not on meds.     Pulmonary embolism (H)  or so    briefly on wafarin.     Sleep apnea     cpap     Vitamin D deficiency    Diverticulosis  Obstructive sleep apnea which resolved following gastric bypass  GERD  Iron deficiency  Anemia  Ovarian cyst   IUD inserted on 19    Past Surgical History   Past Surgical History:   Procedure Laterality Date     ADRENALECTOMY Right 2018    Procedure: ADRENALECTOMY;  Right Adrenalectomy,  Embolize Liver , Anesthesia Block;  Surgeon: Warren Mansfield MD;  Location: [facility] OR     ADRENALECTOMY       ADRENALECTOMY Right       SECTION      twice      SECTION      2      SECTION      x2     EMBOLECTOMY ABDOMEN N/A 2018    Procedure: EMBOLECTOMY ABDOMEN;;  Surgeon: Ector Mcintyre MD;  Location: [facility] OR     EXTRACORPOREAL SHOCK WAVE LITHOTRIPSY (ESWL)       GASTRIC BYPASS       CT CYSTO/URETERO W/LITHOTRIPSY &INDWELL STENT INSRT Left 2018    Procedure: CYSTOSCOPY, LEFT URETEROSCOPY LASER LITHOTRIPSY STENT INSERTION;  Surgeon: Skyler Delvalle MD;  Location: [facility] OR;  Service: Urology     CT LAP GASTRIC BYPASS/DERICK-EN-Y N/A 2016    Procedure: GASTRIC BYPASS LAPAROSCOPIC DERICK-EN-Y;  Surgeon: Randy Sutherland MD;  Location: [facility] OR;  Service: General     TUBAL " LIGATION       WISDOM TOOTH EXTRACTION Bilateral        Current Medications    Current Outpatient Medications:      acetaminophen (TYLENOL) 325 MG tablet, Take 2 tablets (650 mg) by mouth every 4 hours as needed for other (multimodal surgical pain management along with NSAIDS and opioid medication as indicated based on pain control and physical function.), Disp: 150 tablet, Rfl: 0     amphetamine-dextroamphetamine (ADDERALL XR) 10 MG 24 hr capsule, Take 1 capsule (10 mg) by mouth daily, Disp: 30 capsule, Rfl: 0     amphetamine-dextroamphetamine (ADDERALL XR) 20 MG 24 hr capsule, Take 1 capsule (20 mg) by mouth daily (Patient not taking: Reported on 1/18/2022), Disp: 30 capsule, Rfl: 0     buPROPion (WELLBUTRIN SR) 150 MG 12 hr tablet, TAKE 1 TABLET (150 MG TOTAL) BY MOUTH 2 (TWO) TIMES A DAY., Disp: 180 tablet, Rfl: 1     calcitRIOL (ROCALTROL) 0.5 MCG capsule, , Disp: , Rfl:      clonazePAM (KLONOPIN) 0.5 MG tablet, Take 1 tablet (0.5 mg) by mouth 2 times daily as needed for anxiety (Patient not taking: Reported on 2/1/2022), Disp: 60 tablet, Rfl: 0     FLUoxetine (PROZAC) 20 MG capsule, Take 3 capsules (60 mg) by mouth daily, Disp: 270 capsule, Rfl: 1     gabapentin (NEURONTIN) 300 MG capsule, Take 3 capsules (900 mg) by mouth 3 times daily, Disp: 810 capsule, Rfl: 1     hydrocortisone (CORTEF) 10 MG tablet, Take 3 tablets (30 MG) every morning and take 2 tablets (20 MG) by mouth every day at 2 PM. Additional as directed., Disp: 550 tablet, Rfl: 0     levothyroxine (SYNTHROID/LEVOTHROID) 125 MCG tablet, Take 1 tablet (125 mcg) by mouth daily For additional refills, please schedule a follow-up appointment., Disp: 90 tablet, Rfl: 0     medical cannabis (Patient's own supply), See Admin Instructions (The purpose of this order is to document that the patient reports taking medical cannabis.  This is not a prescription, and is not used to certify that the patient has a qualifying medical condition.) (Patient not  taking: Reported on 2/1/2022), Disp: , Rfl:      ondansetron (ZOFRAN) 8 MG tablet, Take 1 tablet (8 mg) by mouth every 8 hours as needed for nausea, Disp: 30 tablet, Rfl: 0     prochlorperazine (COMPAZINE) 5 MG tablet, Take 1 tablet (5 mg) by mouth every 8 hours as needed for nausea or vomiting, Disp: 90 tablet, Rfl: 3     temazepam (RESTORIL) 15 MG capsule, Take 1-2 capsules (15-30 mg) by mouth nightly as needed for sleep, Disp: 60 capsule, Rfl: 0  Iron supplement daily for 6 months     Family History   Problem Relation Age of Onset     Depression Paternal Grandmother      Diabetes Mother      Diabetes Father      Hypertension Father      Chronic Obstructive Pulmonary Disease Father      Cancer Maternal Grandmother         gastric     Urolithiasis Maternal Grandmother      Heart Disease Maternal Grandfather      Cancer Maternal Grandfather         lung     Heart Disease Paternal Grandfather      Breast Cancer No family hx of      Colon Cancer No family hx of      Prostate Cancer No family hx of      Cerebrovascular Disease No family hx of      Clotting Disorder No family hx of      Gout No family hx of        Social History   with 2 children, 7 and 10-year-old. She denies smoking, drinking alcohol or using illicit drugs. Occupation: disabled.     Review of Systems   10 point review of systems negative unless specified above.              Vital Signs     Previous Weights:    Wt Readings from Last 10 Encounters:   02/01/22 71 kg (156 lb 8 oz)   01/18/22 70.5 kg (155 lb 8 oz)   10/29/21 71.1 kg (156 lb 11.2 oz)   10/25/21 71.8 kg (158 lb 6.4 oz)   09/03/21 72.2 kg (159 lb 3.2 oz)   08/07/21 71.2 kg (157 lb)   07/05/21 70.7 kg (155 lb 12.8 oz)   06/07/21 68.7 kg (151 lb 6.4 oz)   05/17/21 68 kg (150 lb)   04/13/21 68.5 kg (151 lb 1.6 oz)        There were no vitals taken for this visit.    Physical Exam  General Appearance: alert, no distress noted   Eyes: grossly normal to inspection, conjunctivae and sclerae  normal, no lid lag or stare   Respiratory: no audible wheeze, cough, or visible cyanosis.  No visible retractions or increased work of breathing.  Able to speak fully in complete sentences.  Neurological: Cranial nerves grossly intact, mentation intact and speech normal; no tremor of the outstretched hands   Skin: no lesions on exposed skin   Psychological: mentation appears normal, affect normal, judgement and insight intact, normal speech and appearance well-groomed    Lab Results  I reviewed prior lab results documented in Epic.  TSH   Date Value Ref Range Status   2022 0.29 (L) 0.40 - 4.00 mU/L Final   2022 0.72 0.40 - 4.00 mU/L Final   2021 0.27 (L) 0.40 - 4.00 mU/L Final   10/25/2021 0.35 0.30 - 5.00 uIU/mL Final   2021 0.28 (L) 0.40 - 4.00 mU/L Final   08/10/2021 0.44 0.40 - 4.00 mU/L Final   2021 0.22 (L) 0.40 - 4.00 mU/L Final   2021 0.44 0.40 - 4.00 mU/L Final   05/10/2021 0.18 (L) 0.40 - 4.00 mU/L Final   2021 0.39 (L) 0.40 - 4.00 mU/L Final   2021 0.18 (L) 0.40 - 4.00 mU/L Final     T4 Free   Date Value Ref Range Status   2021 0.91 0.76 - 1.46 ng/dL Final   2021 0.93 0.76 - 1.46 ng/dL Final   05/10/2021 0.84 0.76 - 1.46 ng/dL Final   2021 0.76 0.76 - 1.46 ng/dL Final   2021 0.82 0.76 - 1.46 ng/dL Final     Free T4   Date Value Ref Range Status   2022 1.11 0.76 - 1.46 ng/dL Final   2021 1.05 0.76 - 1.46 ng/dL Final   10/25/2021 1.04 0.70 - 1.80 ng/dL Final     Comment:     Performance of the Free T4 test has not been established with  specimens (<= 2 months of age).     2021 1.03 0.76 - 1.46 ng/dL Final   08/10/2021 1.05 0.76 - 1.46 ng/dL Final       Assessment     1. 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 Ki-67 revealed an increased proliferation index of approximately 20%.   The tumor was functional and  presented with high cortisol and DHEAS levels.  Post surgery/radiation and while undergoing treatment with mitotane (started in July 2018), the DHEAS has remained undetectable.  Adjusting the dose of mitotane to therapeutic levels remains challenging, due to side effects.  It was discontinued in May 2021.   Plan:  Continue monitoring with regular CTs.    2.  Primary adrenal insufficiency, mitotane induced   Clinically, she endorses symptoms of both hydrocortisone excess and hydrocortisone deficiency.  I instructed her to check her blood pressure and pulse at the time she experiences the episodes of lightheadedness.    Based on her weight, the HC replacement dose is around 15 mg daily.    The majority of patients are able to recover the adrenal gland function following the cessation of mitotane.  The complete recovery can take up to 2-3 years.  I recommend to have an ACTH and morning cortisol level checked.  The day prior to scheduling morning labs, I instructed her to decrease the dose of the afternoon hydrocortisone to 5 mg.    3. Hypothyroidism, presumed to be secondary to mitotane induced suppression of TSH   Follow-up TFTs and considering titrating down the dose of levothyroxine based on lab results.    4.  Status post bariatric surgery  I recommended to increase the dose of calcium to 600 mg twice daily and a daily multivitamin and 2000 units vitamin D daily.  Discussed about the importance of  the calcium supplements and the multivitamin at least 4 hours from levothyroxine.    Orders Placed This Encounter   Procedures     Adrenal corticotropin     Cortisol     TSH     T4 free     T3 total     Basic metabolic panel     45 minutes spent on the date of the encounter doing chart review, history and exam, documentation and further activities as noted above.      Suzy is a 36 year old who is being evaluated via a billable video visit.      How would you like to obtain your AVS? MyChart  If the video  visit is dropped, the invitation should be resent by: Text to cell phone: [phone]   Will anyone else be joining your video visit? Mali Jaquez MD

## 2022-03-03 NOTE — PROGRESS NOTES
"Video-Visit Details    Type of service:  Video Visit    Video call duration:   Started 10:01 am   Ended: 10:34 am      Originating Location (pt. Location): Home  Distant Location (provider location):  [facility]  Platform used for Video Visit: AmWell/part of the visit was done via phone (~10 minutes) as the audio connection dropped.     The patient is seen in f/up for adrenocortical cancer.     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  The adrenal mass was first identified in May 2018, on an abdominal CT done for evaluation of kidney stones.  The mass measured 9.2 x 8 cm on the noncontrast CT from 5/8/18, and was inseparable from the liver and upper pole of the right kidney.  The chest CT from 6/14/18 identified 2 indeterminate solid pulmonary nodules, with the largest measuring 3 mm in the subpleural posterior left lower lobe.  The patient underwent right adrenalectomy on 6/25/18.  Surgery was complicated by diaphragmatic injury during hepatic mobilization and small fracture of the right liver lobe, managed conservatively.  The pathology report (re read at [facility]) revealed the following:  \"Adrenal cortical carcinoma, low grade, oncocytic type, forming a mass 11.3 cm and weighing 412 grams (per report). Surgical resection margins are positive for tumor (per report). Lymphovascular invasion is focally present. The tumor seems to be confined to the adrenal gland.  AJCC Stage (with available surgical materials): pT2NX (8th edition, 2017). Immunoperoxidase stains were performed on paraffin sections at the referring institution and reviewed at [facility] in [place], [place].  Tumor cells are positive for calretinin, synaptophysin and Melan A, supporting the above diagnosis. Ki-67 reveal an increased proliferation index of approximately 20% in the most active " "areas.\"    Treatment with mitotane was started in July 2018 and she completed radiation to the adrenal bed on October 8, 2018.  Titrating up the dose of mitotane has remained difficult.  She has not been able to attain a serum mitotane level above 10.  Mitotane was discontinued by Dr. Liang from [facility], in May 2021. Her last Mitotane level from 9/3/2021 was 3.6. It was 2.1 on recent labwork from 2/8/22.     The patient feels a lot better since discontinuing the mitotane.  The dose of hydrocortisone was titrated down to 50 mg in the morning and 10 mg at 3 PM, since the fall 2021.  The fludrocortisone up was discontinued shortly after mitotane was stopped.  The current dose of levothyroxine remains unchanged, at 125 mcg daily.  She has a blood pressure cuff at home but she has not been checking her blood pressure.    Clinically, the patient complains of the following symptoms:  Persistent fatigue  Increased hunger and some weight gain over the last month  Difficulty concentrating and memory impairment.  These symptoms have improved since being off mitotane but they are still lingering.  Shortness of breath present at night, when she lies on her back, associated with some dry cough, longstanding.  The cough is not present during daytime.  With exercise, she might develop shortness of breath during daytime.  Episodes of lightheadedness where she feels like passing out and she has to lean herself against the wall and refocus.  The last time she experienced this was a few days ago.  They are relieved by taking big breaths.  On average, they have occurred a couple of times a week and they are not always related to positional changes.  There are no other associated symptoms.  The hands tremor has significantly improved but is still present.   Tingling in her hands and feet if she sits in a weird position or holding something in her hands for a longer period of time.  For the last 2 months, she has been " experiencing questionable hot flashes.  Since having the IUD placed 3 years ago, she did not experience menstrual periods until March 2021.  She now has very light menstrual periods, 4 to 5 weeks apart.    She has resumed her therapy sessions over the last 1 and half months.  A couple of months ago, she started Adderall and she reports feeling overall better since taking it.  hen she developed calcium carbonate 600 mg per 1 table daily     Currently, she does not take any vitamin D or multivitamins.  She has been taking 600 mg calcium daily.  In early January she had an episode which she thinks might have represented a seizure.  It happened shortly after starting Adderall and she was smoking marijuana and drinking at that time.  Paramedics were called and, as per patient, she lost consciousness for approximately 1 hour.  She remembers sweating and falling to the ground.     Most recent abd CT from 2/8/22 unremarkable   Brain MRI from 11/2021 unremarkable     Pertinent data and labs:  5/14/18 normal plasma metanephrines  5/31/18 ALD 13.4, renin 0.8   5/28/18 urinary cortisol 351.6, 8 AM cortisol 23 (after dexamethasone), normal metanephrines     6/5/18 ACTH<10, DHEAS 740 (prior to surgery)  7/30/18  DHEAS <0.5, cortisol AM 19, testosterone <7    7/31/18 aldosterone 7.3  8/10/18 CRH 3 (normal<10)  12/3/18 cortisol PM 36, free T4 0.7, TSH 2.3, Ca 8.6,  (elevated since 11/18)   12/4/18 ACTH <5   12/6/18  DHEAS <0.5  12/20/18 ferritin 9  2/12/19 androstenedione 0.471, K 3.6, ALD 7.7 renin 4.4   2/13/19 24 hrs urinary cortisol 130.9   2/18 started on fludrocortisone at 0.1 mg daily   2/26/19 DHEAS <15   2/26/19 9 AM cortisol 7.4, ACTH 23 (5-52).   3/12/19 DHEAS <15  9/24/2019 potassium 4, sodium 137, renin 10.6, mitotane 18.8, cortisol 25.8, ACTH < 5, aldosterone 4.9, free T4 1.17, TSH 0.07, DHEAS<15 (lab work was done at 9:30 AM, outside records).  6/8/20 DHEAS <15   12/9/20 corrected calcium 8.8, TSH 0.25, free  T4 1.03, DHEAS <15, ACTH,10   7/16/20 vitamin D 51, PTH 59      Suzy underwent bariatric surgery in 6/2016. Pre-surgery weight 235 lb, inga post-surgery 135 lbs. Her weight in 4/2017 was 139 lbs. In 3/2018, her weight was 154 lbs. It was 157 lbs prior to adrenalectomy.   Her weight this months was 156 lbs.     Pertinent new labs reviewed:  12/31/21 Aldosterone 12.3, renin 3.2, DHEA-S<15, cortisol level at 2 PM 2.6, ACTH undetectable, Albumin 3.1, calcium 8.6, TSH 0.27, free T4 1.05  1/18/22 TSH 0.72  1/20/22 24-hour urinary cortisol 93, urinary cortisol normal at 110  2/8/22 TSH 0.29, free T4 1.11, DHEA-S<15, ACTH 11, calcium 8.6, Albumin 3.4  2/21/22 24-hour urinary cortisol 74, cortisone 131 ().    Past Medical History   Past Medical History:   Diagnosis Date     Adrenal cortex cancer, right (H) 6/30/2018    Resected 6/25/18, Dr. Mansfield.     Adrenal mass (H)      Anemia     thought due to heavy menses.     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  Check in May. SCP started.  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     SHREYA on CPAP 12/17/2015    Stopped using CPAP the Fall of 2016 after weight loss as even at lower  "pressure/adjustment couldn't tolerate the cpap. Resting well, cautioned to watch for any signs of fatigue and consider \"titration study\" in the future to see if cpap is still required at her new weight.     Pneumonia     hx of recurrent pneumonia issues/respiratory infections.     Polycystic ovary syndrome     able to get pregnant, not on meds.     Pulmonary embolism (H)  or so    briefly on wafarin.     Sleep apnea     cpap     Vitamin D deficiency    Diverticulosis  Obstructive sleep apnea which resolved following gastric bypass  GERD  Iron deficiency  Anemia  Ovarian cyst   IUD inserted on 19    Past Surgical History   Past Surgical History:   Procedure Laterality Date     ADRENALECTOMY Right 2018    Procedure: ADRENALECTOMY;  Right Adrenalectomy,  Embolize Liver , Anesthesia Block;  Surgeon: Warren Mansfield MD;  Location: [facility] OR     ADRENALECTOMY       ADRENALECTOMY Right       SECTION      twice      SECTION      2      SECTION      x2     EMBOLECTOMY ABDOMEN N/A 2018    Procedure: EMBOLECTOMY ABDOMEN;;  Surgeon: Ector Mcintyre MD;  Location: [facility] OR     EXTRACORPOREAL SHOCK WAVE LITHOTRIPSY (ESWL)       GASTRIC BYPASS       RI CYSTO/URETERO W/LITHOTRIPSY &INDWELL STENT INSRT Left 2018    Procedure: CYSTOSCOPY, LEFT URETEROSCOPY LASER LITHOTRIPSY STENT INSERTION;  Surgeon: Skyler Delvalle MD;  Location: [facility];  Service: Urology     RI LAP GASTRIC BYPASS/DERICK-EN-Y N/A 2016    Procedure: GASTRIC BYPASS LAPAROSCOPIC DERICK-EN-Y;  Surgeon: Randy Sutherland MD;  Location: [facility];  Service: General     TUBAL LIGATION       WISDOM TOOTH EXTRACTION Bilateral        Current Medications    Current Outpatient Medications:      acetaminophen (TYLENOL) 325 MG tablet, Take 2 tablets (650 mg) by mouth every 4 hours as needed for other (multimodal surgical pain management along with NSAIDS and opioid medication as indicated based on " pain control and physical function.), Disp: 150 tablet, Rfl: 0     amphetamine-dextroamphetamine (ADDERALL XR) 10 MG 24 hr capsule, Take 1 capsule (10 mg) by mouth daily, Disp: 30 capsule, Rfl: 0     amphetamine-dextroamphetamine (ADDERALL XR) 20 MG 24 hr capsule, Take 1 capsule (20 mg) by mouth daily (Patient not taking: Reported on 1/18/2022), Disp: 30 capsule, Rfl: 0     buPROPion (WELLBUTRIN SR) 150 MG 12 hr tablet, TAKE 1 TABLET (150 MG TOTAL) BY MOUTH 2 (TWO) TIMES A DAY., Disp: 180 tablet, Rfl: 1     calcitRIOL (ROCALTROL) 0.5 MCG capsule, , Disp: , Rfl:      clonazePAM (KLONOPIN) 0.5 MG tablet, Take 1 tablet (0.5 mg) by mouth 2 times daily as needed for anxiety (Patient not taking: Reported on 2/1/2022), Disp: 60 tablet, Rfl: 0     FLUoxetine (PROZAC) 20 MG capsule, Take 3 capsules (60 mg) by mouth daily, Disp: 270 capsule, Rfl: 1     gabapentin (NEURONTIN) 300 MG capsule, Take 3 capsules (900 mg) by mouth 3 times daily, Disp: 810 capsule, Rfl: 1     hydrocortisone (CORTEF) 10 MG tablet, Take 3 tablets (30 MG) every morning and take 2 tablets (20 MG) by mouth every day at 2 PM. Additional as directed., Disp: 550 tablet, Rfl: 0     levothyroxine (SYNTHROID/LEVOTHROID) 125 MCG tablet, Take 1 tablet (125 mcg) by mouth daily For additional refills, please schedule a follow-up appointment., Disp: 90 tablet, Rfl: 0     medical cannabis (Patient's own supply), See Admin Instructions (The purpose of this order is to document that the patient reports taking medical cannabis.  This is not a prescription, and is not used to certify that the patient has a qualifying medical condition.) (Patient not taking: Reported on 2/1/2022), Disp: , Rfl:      ondansetron (ZOFRAN) 8 MG tablet, Take 1 tablet (8 mg) by mouth every 8 hours as needed for nausea, Disp: 30 tablet, Rfl: 0     prochlorperazine (COMPAZINE) 5 MG tablet, Take 1 tablet (5 mg) by mouth every 8 hours as needed for nausea or vomiting, Disp: 90 tablet, Rfl: 3      temazepam (RESTORIL) 15 MG capsule, Take 1-2 capsules (15-30 mg) by mouth nightly as needed for sleep, Disp: 60 capsule, Rfl: 0  Iron supplement daily for 6 months     Family History   Problem Relation Age of Onset     Depression Paternal Grandmother      Diabetes Mother      Diabetes Father      Hypertension Father      Chronic Obstructive Pulmonary Disease Father      Cancer Maternal Grandmother         gastric     Urolithiasis Maternal Grandmother      Heart Disease Maternal Grandfather      Cancer Maternal Grandfather         lung     Heart Disease Paternal Grandfather      Breast Cancer No family hx of      Colon Cancer No family hx of      Prostate Cancer No family hx of      Cerebrovascular Disease No family hx of      Clotting Disorder No family hx of      Gout No family hx of        Social History   with 2 children, 7 and 10-year-old. She denies smoking, drinking alcohol or using illicit drugs. Occupation: disabled.     Review of Systems   10 point review of systems negative unless specified above.              Vital Signs     Previous Weights:    Wt Readings from Last 10 Encounters:   02/01/22 71 kg (156 lb 8 oz)   01/18/22 70.5 kg (155 lb 8 oz)   10/29/21 71.1 kg (156 lb 11.2 oz)   10/25/21 71.8 kg (158 lb 6.4 oz)   09/03/21 72.2 kg (159 lb 3.2 oz)   08/07/21 71.2 kg (157 lb)   07/05/21 70.7 kg (155 lb 12.8 oz)   06/07/21 68.7 kg (151 lb 6.4 oz)   05/17/21 68 kg (150 lb)   04/13/21 68.5 kg (151 lb 1.6 oz)        There were no vitals taken for this visit.    Physical Exam  General Appearance: alert, no distress noted   Eyes: grossly normal to inspection, conjunctivae and sclerae normal, no lid lag or stare   Respiratory: no audible wheeze, cough, or visible cyanosis.  No visible retractions or increased work of breathing.  Able to speak fully in complete sentences.  Neurological: Cranial nerves grossly intact, mentation intact and speech normal; no tremor of the outstretched hands   Skin: no lesions  on exposed skin   Psychological: mentation appears normal, affect normal, judgement and insight intact, normal speech and appearance well-groomed    Lab Results  I reviewed prior lab results documented in Epic.  TSH   Date Value Ref Range Status   2022 0.29 (L) 0.40 - 4.00 mU/L Final   2022 0.72 0.40 - 4.00 mU/L Final   2021 0.27 (L) 0.40 - 4.00 mU/L Final   10/25/2021 0.35 0.30 - 5.00 uIU/mL Final   2021 0.28 (L) 0.40 - 4.00 mU/L Final   08/10/2021 0.44 0.40 - 4.00 mU/L Final   2021 0.22 (L) 0.40 - 4.00 mU/L Final   2021 0.44 0.40 - 4.00 mU/L Final   05/10/2021 0.18 (L) 0.40 - 4.00 mU/L Final   2021 0.39 (L) 0.40 - 4.00 mU/L Final   2021 0.18 (L) 0.40 - 4.00 mU/L Final     T4 Free   Date Value Ref Range Status   2021 0.91 0.76 - 1.46 ng/dL Final   2021 0.93 0.76 - 1.46 ng/dL Final   05/10/2021 0.84 0.76 - 1.46 ng/dL Final   2021 0.76 0.76 - 1.46 ng/dL Final   2021 0.82 0.76 - 1.46 ng/dL Final     Free T4   Date Value Ref Range Status   2022 1.11 0.76 - 1.46 ng/dL Final   2021 1.05 0.76 - 1.46 ng/dL Final   10/25/2021 1.04 0.70 - 1.80 ng/dL Final     Comment:     Performance of the Free T4 test has not been established with  specimens (<= 2 months of age).     2021 1.03 0.76 - 1.46 ng/dL Final   08/10/2021 1.05 0.76 - 1.46 ng/dL Final       Assessment     1. 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 Ki-67 revealed an increased proliferation index of approximately 20%.   The tumor was functional and presented with high cortisol and DHEAS levels.  Post surgery/radiation and while undergoing treatment with mitotane (started in 2018), the DHEAS has remained undetectable.  Adjusting the dose of mitotane to therapeutic levels remains challenging, due to side effects.  It was discontinued in May 2021.   Plan:  Continue  monitoring with regular CTs.    2.  Primary adrenal insufficiency, mitotane induced   Clinically, she endorses symptoms of both hydrocortisone excess and hydrocortisone deficiency.  I instructed her to check her blood pressure and pulse at the time she experiences the episodes of lightheadedness.    Based on her weight, the HC replacement dose is around 15 mg daily.    The majority of patients are able to recover the adrenal gland function following the cessation of mitotane.  The complete recovery can take up to 2-3 years.  I recommend to have an ACTH and morning cortisol level checked.  The day prior to scheduling morning labs, I instructed her to decrease the dose of the afternoon hydrocortisone to 5 mg.    3. Hypothyroidism, presumed to be secondary to mitotane induced suppression of TSH   Follow-up TFTs and considering titrating down the dose of levothyroxine based on lab results.    4.  Status post bariatric surgery  I recommended to increase the dose of calcium to 600 mg twice daily and a daily multivitamin and 2000 units vitamin D daily.  Discussed about the importance of  the calcium supplements and the multivitamin at least 4 hours from levothyroxine.    Orders Placed This Encounter   Procedures     Adrenal corticotropin     Cortisol     TSH     T4 free     T3 total     Basic metabolic panel     45 minutes spent on the date of the encounter doing chart review, history and exam, documentation and further activities as noted above.

## 2022-03-03 NOTE — PROGRESS NOTES
Suzy is a 36 year old who is being evaluated via a billable video visit.      How would you like to obtain your AVS? "[employer]hart  If the video visit is dropped, the invitation should be resent by: Text to cell phone: [phone]   Will anyone else be joining your video visit? No

## 2022-03-03 NOTE — PATIENT INSTRUCTIONS
Schedule lab work in the morning.  You do not need to fast.  The day before lab work, decrease the dose of afternoon hydrocortisone from 10 to 5 mg.  You should bring the hydrocortisone tablets with you during the lab appointment and take the morning dose of hydrocortisone after the lab is drawn.    Increase the dose of calcium to 600 mg twice daily    Start taking 2000 units vitamin D daily and a daily multivitamin    The calcium and the multivitamin have to be  4 hours from levothyroxine.

## 2022-03-05 DIAGNOSIS — C74.01 MALIGNANT NEOPLASM OF CORTEX OF RIGHT ADRENAL GLAND (H): ICD-10-CM

## 2022-03-05 DIAGNOSIS — F51.01 PRIMARY INSOMNIA: ICD-10-CM

## 2022-03-08 ENCOUNTER — MYC REFILL (OUTPATIENT)
Dept: ONCOLOGY | Facility: CLINIC | Age: 37
End: 2022-03-08
Payer: COMMERCIAL

## 2022-03-08 DIAGNOSIS — C74.01 MALIGNANT NEOPLASM OF CORTEX OF RIGHT ADRENAL GLAND (H): ICD-10-CM

## 2022-03-08 DIAGNOSIS — F51.01 PRIMARY INSOMNIA: ICD-10-CM

## 2022-03-08 NOTE — TELEPHONE ENCOUNTER
Pending Prescriptions:                       Disp   Refills    temazepam (RESTORIL) 15 MG capsule        60 cap*0            Sig: Take 1-2 capsules (15-30 mg) by mouth nightly as           needed for sleep          Last Written Prescription Date:  2/4/22  Last Fill Quantity: 60,   # refills: 0  Last Office Visit: 2/16/22  Future Office visit: 5/18/22     Routing refill request to provider.     Mahendra Gibson, RN, BSN.  RN Care Coordinator     [facility]   [phone]

## 2022-03-09 RX ORDER — TEMAZEPAM 15 MG/1
15-30 CAPSULE ORAL
Qty: 60 CAPSULE | Refills: 0 | Status: SHIPPED | OUTPATIENT
Start: 2022-03-09 | End: 2022-04-03

## 2022-03-09 RX ORDER — TEMAZEPAM 15 MG/1
15-30 CAPSULE ORAL
Qty: 60 CAPSULE | Refills: 0 | OUTPATIENT
Start: 2022-03-09

## 2022-03-24 ENCOUNTER — LAB (OUTPATIENT)
Dept: LAB | Facility: CLINIC | Age: 37
End: 2022-03-24
Payer: COMMERCIAL

## 2022-03-24 DIAGNOSIS — C74.91 MALIGNANT NEOPLASM OF ADRENAL GLAND, RIGHT (H): ICD-10-CM

## 2022-03-24 DIAGNOSIS — E27.40 UNSPECIFIED ADRENOCORTICAL INSUFFICIENCY (H): ICD-10-CM

## 2022-03-24 DIAGNOSIS — E27.1 PRIMARY ADRENAL INSUFFICIENCY (H): ICD-10-CM

## 2022-03-24 DIAGNOSIS — C74.91: ICD-10-CM

## 2022-03-24 DIAGNOSIS — E03.8 SECONDARY HYPOTHYROIDISM: ICD-10-CM

## 2022-03-24 LAB
ALBUMIN SERPL-MCNC: 3.6 G/DL (ref 3.4–5)
ALP SERPL-CCNC: 134 U/L (ref 40–150)
ALT SERPL W P-5'-P-CCNC: 33 U/L (ref 0–50)
ANION GAP SERPL CALCULATED.3IONS-SCNC: 3 MMOL/L (ref 3–14)
ANION GAP SERPL CALCULATED.3IONS-SCNC: 7 MMOL/L (ref 3–14)
AST SERPL W P-5'-P-CCNC: 22 U/L (ref 0–45)
BASOPHILS # BLD AUTO: 0 10E3/UL (ref 0–0.2)
BASOPHILS NFR BLD AUTO: 1 %
BILIRUB SERPL-MCNC: 0.3 MG/DL (ref 0.2–1.3)
BUN SERPL-MCNC: 12 MG/DL (ref 7–30)
BUN SERPL-MCNC: 12 MG/DL (ref 7–30)
CALCIUM SERPL-MCNC: 8.8 MG/DL (ref 8.5–10.1)
CALCIUM SERPL-MCNC: 9.1 MG/DL (ref 8.5–10.1)
CHLORIDE BLD-SCNC: 102 MMOL/L (ref 94–109)
CHLORIDE BLD-SCNC: 107 MMOL/L (ref 94–109)
CHOLEST SERPL-MCNC: 202 MG/DL
CO2 SERPL-SCNC: 29 MMOL/L (ref 20–32)
CO2 SERPL-SCNC: 30 MMOL/L (ref 20–32)
CORTIS SERPL-MCNC: 16.9 UG/DL (ref 4–22)
CREAT SERPL-MCNC: 0.78 MG/DL (ref 0.52–1.04)
CREAT SERPL-MCNC: 0.8 MG/DL (ref 0.52–1.04)
EOSINOPHIL # BLD AUTO: 0.1 10E3/UL (ref 0–0.7)
EOSINOPHIL NFR BLD AUTO: 3 %
ERYTHROCYTE [DISTWIDTH] IN BLOOD BY AUTOMATED COUNT: 13.4 % (ref 10–15)
FASTING STATUS PATIENT QL REPORTED: YES
GFR SERPL CREATININE-BSD FRML MDRD: >90 ML/MIN/1.73M2
GFR SERPL CREATININE-BSD FRML MDRD: >90 ML/MIN/1.73M2
GLUCOSE BLD-MCNC: 70 MG/DL (ref 70–99)
GLUCOSE BLD-MCNC: 88 MG/DL (ref 70–99)
HCT VFR BLD AUTO: 43.1 % (ref 35–47)
HDLC SERPL-MCNC: 95 MG/DL
HGB BLD-MCNC: 13.8 G/DL (ref 11.7–15.7)
HOLD SPECIMEN: NORMAL
LDLC SERPL CALC-MCNC: 85 MG/DL
LYMPHOCYTES # BLD AUTO: 1.4 10E3/UL (ref 0.8–5.3)
LYMPHOCYTES NFR BLD AUTO: 32 %
Lab: NORMAL
MCH RBC QN AUTO: 29.4 PG (ref 26.5–33)
MCHC RBC AUTO-ENTMCNC: 32 G/DL (ref 31.5–36.5)
MCV RBC AUTO: 92 FL (ref 78–100)
MONOCYTES # BLD AUTO: 0.4 10E3/UL (ref 0–1.3)
MONOCYTES NFR BLD AUTO: 10 %
NEUTROPHILS # BLD AUTO: 2.4 10E3/UL (ref 1.6–8.3)
NEUTROPHILS NFR BLD AUTO: 54 %
NONHDLC SERPL-MCNC: 107 MG/DL
PERFORMING LABORATORY: NORMAL
PLATELET # BLD AUTO: 215 10E3/UL (ref 150–450)
POTASSIUM BLD-SCNC: 3.6 MMOL/L (ref 3.4–5.3)
POTASSIUM BLD-SCNC: 3.9 MMOL/L (ref 3.4–5.3)
PROT SERPL-MCNC: 6.8 G/DL (ref 6.8–8.8)
RBC # BLD AUTO: 4.69 10E6/UL (ref 3.8–5.2)
SODIUM SERPL-SCNC: 138 MMOL/L (ref 133–144)
SODIUM SERPL-SCNC: 140 MMOL/L (ref 133–144)
SPECIMEN STATUS: NORMAL
T3 SERPL-MCNC: 119 NG/DL (ref 60–181)
T4 FREE SERPL-MCNC: 1.12 NG/DL (ref 0.76–1.46)
TEST NAME: NORMAL
TRIGL SERPL-MCNC: 112 MG/DL
TSH SERPL DL<=0.005 MIU/L-ACNC: 0.08 MU/L (ref 0.4–4)
WBC # BLD AUTO: 4.3 10E3/UL (ref 4–11)

## 2022-03-24 PROCEDURE — 82533 TOTAL CORTISOL: CPT

## 2022-03-24 PROCEDURE — 80048 BASIC METABOLIC PNL TOTAL CA: CPT

## 2022-03-24 PROCEDURE — 85025 COMPLETE CBC W/AUTO DIFF WBC: CPT

## 2022-03-24 PROCEDURE — 36415 COLL VENOUS BLD VENIPUNCTURE: CPT

## 2022-03-24 PROCEDURE — 84480 ASSAY TRIIODOTHYRONINE (T3): CPT

## 2022-03-24 PROCEDURE — 82627 DEHYDROEPIANDROSTERONE: CPT

## 2022-03-24 PROCEDURE — 82024 ASSAY OF ACTH: CPT

## 2022-03-24 PROCEDURE — 84443 ASSAY THYROID STIM HORMONE: CPT

## 2022-03-24 PROCEDURE — 84439 ASSAY OF FREE THYROXINE: CPT

## 2022-03-24 PROCEDURE — 80375 DRUG/SUBSTANCE NOS 1-3: CPT | Mod: 90

## 2022-03-24 PROCEDURE — 80053 COMPREHEN METABOLIC PANEL: CPT

## 2022-03-24 PROCEDURE — 80061 LIPID PANEL: CPT

## 2022-03-24 PROCEDURE — 82088 ASSAY OF ALDOSTERONE: CPT

## 2022-03-24 PROCEDURE — 84244 ASSAY OF RENIN: CPT | Mod: 90

## 2022-03-25 LAB
ACTH PLAS-MCNC: 55 PG/ML
ALDOST SERPL-MCNC: 14.5 NG/DL (ref 0–31)
DHEA-S SERPL-MCNC: <15 UG/DL (ref 35–430)

## 2022-03-29 ENCOUNTER — MYC MEDICAL ADVICE (OUTPATIENT)
Dept: FAMILY MEDICINE | Facility: CLINIC | Age: 37
End: 2022-03-29
Payer: COMMERCIAL

## 2022-03-29 DIAGNOSIS — R41.89 BRAIN FOG: Primary | ICD-10-CM

## 2022-03-29 DIAGNOSIS — Z92.21 HISTORY OF CHEMOTHERAPY: ICD-10-CM

## 2022-03-29 LAB — MISCELLANEOUS TEST 1 (ARUP): NORMAL

## 2022-03-30 LAB — RENIN PLAS-CCNC: 6.8 NG/ML/HR

## 2022-04-03 ENCOUNTER — MYC REFILL (OUTPATIENT)
Dept: ONCOLOGY | Facility: CLINIC | Age: 37
End: 2022-04-03
Payer: COMMERCIAL

## 2022-04-03 DIAGNOSIS — F51.01 PRIMARY INSOMNIA: ICD-10-CM

## 2022-04-03 DIAGNOSIS — C74.01 MALIGNANT NEOPLASM OF CORTEX OF RIGHT ADRENAL GLAND (H): ICD-10-CM

## 2022-04-04 RX ORDER — TEMAZEPAM 15 MG/1
15-30 CAPSULE ORAL
Qty: 60 CAPSULE | Refills: 0 | Status: SHIPPED | OUTPATIENT
Start: 2022-04-04 | End: 2022-05-03

## 2022-04-04 NOTE — TELEPHONE ENCOUNTER
Signed Prescriptions:                        Disp   Refills    temazepam (RESTORIL) 15 MG capsule         60 cap*0        Sig: Take 1-2 capsules (15-30 mg) by mouth nightly as           needed for sleep  Authorizing Provider: SANDRA CRUZ, RN, BSN, OCN  Nurse Care Coordinator  [facility]  P: [phone]     F: [phone]

## 2022-04-09 DIAGNOSIS — E27.40 ADRENAL INSUFFICIENCY (H): ICD-10-CM

## 2022-04-09 DIAGNOSIS — E03.8 SECONDARY HYPOTHYROIDISM: Primary | ICD-10-CM

## 2022-04-09 RX ORDER — HYDROCORTISONE 10 MG/1
TABLET ORAL
Qty: 270 TABLET | Refills: 1 | Status: SHIPPED | OUTPATIENT
Start: 2022-04-09 | End: 2022-05-20

## 2022-04-09 RX ORDER — LEVOTHYROXINE SODIUM 100 UG/1
100 TABLET ORAL DAILY
Qty: 90 TABLET | Refills: 3 | Status: SHIPPED | OUTPATIENT
Start: 2022-04-09 | End: 2022-05-20

## 2022-04-09 NOTE — RESULT ENCOUNTER NOTE
Iris May! I apologize for delay. Just came back from vacation. The labs reveal the left adrenal gland is able to secrete a good amount of cortisol, so we should be able to gradually discontinue the hydrocortisone. For now, I recommend to decrease the dose of hydrocortisone to 20 mg in the morning and 10 mg in the afternoon.  I also recommend to decrease the dose of levothyroxine from 125 to 100 micrograms daily.  The plan is to recheck both the cortisol and the thyroid hormone levels in 6 weeks.  Meantime, please let me know if you have questions.  I did send a new prescription of levothyroxine to your pharmacy.  With the next lab work, I want you not to take the afternoon dose of hydrocortisone the day prior to lab work and take the morning dose of hydrocortisone after the lab work is done.

## 2022-04-20 ENCOUNTER — MYC MEDICAL ADVICE (OUTPATIENT)
Dept: FAMILY MEDICINE | Facility: CLINIC | Age: 37
End: 2022-04-20
Payer: COMMERCIAL

## 2022-04-20 DIAGNOSIS — F98.8 ATTENTION DEFICIT DISORDER, UNSPECIFIED HYPERACTIVITY PRESENCE: Primary | ICD-10-CM

## 2022-04-20 RX ORDER — DEXTROAMPHETAMINE SACCHARATE, AMPHETAMINE ASPARTATE, DEXTROAMPHETAMINE SULFATE AND AMPHETAMINE SULFATE 5; 5; 5; 5 MG/1; MG/1; MG/1; MG/1
20 TABLET ORAL DAILY
Qty: 30 TABLET | Refills: 0 | Status: SHIPPED | OUTPATIENT
Start: 2022-04-20 | End: 2022-07-29

## 2022-05-02 ENCOUNTER — IMMUNIZATION (OUTPATIENT)
Dept: FAMILY MEDICINE | Facility: CLINIC | Age: 37
End: 2022-05-02
Payer: MEDICARE

## 2022-05-02 PROCEDURE — 0054A COVID-19,PF,PFIZER (12+ YRS): CPT

## 2022-05-02 PROCEDURE — 91305 COVID-19,PF,PFIZER (12+ YRS): CPT

## 2022-05-03 ENCOUNTER — MYC REFILL (OUTPATIENT)
Dept: ONCOLOGY | Facility: CLINIC | Age: 37
End: 2022-05-03
Payer: COMMERCIAL

## 2022-05-03 DIAGNOSIS — F51.01 PRIMARY INSOMNIA: ICD-10-CM

## 2022-05-03 DIAGNOSIS — C74.01 MALIGNANT NEOPLASM OF CORTEX OF RIGHT ADRENAL GLAND (H): ICD-10-CM

## 2022-05-03 RX ORDER — TEMAZEPAM 15 MG/1
15-30 CAPSULE ORAL
Qty: 60 CAPSULE | Refills: 0 | Status: SHIPPED | OUTPATIENT
Start: 2022-05-03 | End: 2022-06-07

## 2022-05-04 DIAGNOSIS — F33.1 MODERATE EPISODE OF RECURRENT MAJOR DEPRESSIVE DISORDER (H): ICD-10-CM

## 2022-05-04 NOTE — TELEPHONE ENCOUNTER
"Requested Prescriptions   Pending Prescriptions Disp Refills     FLUoxetine (PROZAC) 20 MG capsule 270 capsule 1     Sig: Take 3 capsules (60 mg) by mouth daily       SS[facility] Protocol Failed - 5/4/2022  9:13 AM        Failed - PHQ-9 score less than 5 in past 6 months     Please review last PHQ-9 score.           Passed - Medication is active on med list        Passed - Patient is age 18 or older        Passed - No active pregnancy on record        Passed - No positive pregnancy test in last 12 months        Passed - Recent (6 mo) or future (30 days) visit within the authorizing provider's specialty     Patient had office visit in the last 6 months or has a visit in the next 30 days with authorizing provider or within the authorizing provider's specialty.  See \"Patient Info\" tab in inbasket, or \"Choose Columns\" in Meds & Orders section of the refill encounter.                   "

## 2022-05-05 ENCOUNTER — VIRTUAL VISIT (OUTPATIENT)
Dept: SLEEP MEDICINE | Facility: CLINIC | Age: 37
End: 2022-05-05
Attending: INTERNAL MEDICINE
Payer: COMMERCIAL

## 2022-05-05 VITALS — HEIGHT: 64 IN | BODY MASS INDEX: 26.46 KG/M2 | WEIGHT: 155 LBS

## 2022-05-05 DIAGNOSIS — F41.9 ANXIETY: ICD-10-CM

## 2022-05-05 DIAGNOSIS — G47.19 EXCESSIVE DAYTIME SLEEPINESS: ICD-10-CM

## 2022-05-05 DIAGNOSIS — F51.01 PRIMARY INSOMNIA: ICD-10-CM

## 2022-05-05 DIAGNOSIS — C74.01 MALIGNANT NEOPLASM OF CORTEX OF RIGHT ADRENAL GLAND (H): ICD-10-CM

## 2022-05-05 DIAGNOSIS — G47.33 OSA (OBSTRUCTIVE SLEEP APNEA): Primary | ICD-10-CM

## 2022-05-05 DIAGNOSIS — F40.240 CLAUSTROPHOBIA: ICD-10-CM

## 2022-05-05 PROCEDURE — 99204 OFFICE O/P NEW MOD 45 MIN: CPT | Mod: 95 | Performed by: INTERNAL MEDICINE

## 2022-05-05 ASSESSMENT — SLEEP AND FATIGUE QUESTIONNAIRES
HOW LIKELY ARE YOU TO NOD OFF OR FALL ASLEEP WHILE SITTING INACTIVE IN A PUBLIC PLACE: MODERATE CHANCE OF DOZING
HOW LIKELY ARE YOU TO NOD OFF OR FALL ASLEEP WHEN YOU ARE A PASSENGER IN A CAR FOR AN HOUR WITHOUT A BREAK: HIGH CHANCE OF DOZING
HOW LIKELY ARE YOU TO NOD OFF OR FALL ASLEEP WHILE SITTING AND TALKING TO SOMEONE: SLIGHT CHANCE OF DOZING
HOW LIKELY ARE YOU TO NOD OFF OR FALL ASLEEP IN A CAR, WHILE STOPPED FOR A FEW MINUTES IN TRAFFIC: WOULD NEVER DOZE
HOW LIKELY ARE YOU TO NOD OFF OR FALL ASLEEP WHILE LYING DOWN TO REST IN THE AFTERNOON WHEN CIRCUMSTANCES PERMIT: HIGH CHANCE OF DOZING
HOW LIKELY ARE YOU TO NOD OFF OR FALL ASLEEP WHILE SITTING AND READING: HIGH CHANCE OF DOZING
HOW LIKELY ARE YOU TO NOD OFF OR FALL ASLEEP WHILE SITTING QUIETLY AFTER LUNCH WITHOUT ALCOHOL: MODERATE CHANCE OF DOZING
HOW LIKELY ARE YOU TO NOD OFF OR FALL ASLEEP WHILE WATCHING TV: HIGH CHANCE OF DOZING

## 2022-05-05 NOTE — PATIENT INSTRUCTIONS
Your sleep apnea treatment may be affected by device recall    Our records show that you may have a Daniela Respironics CPAP for the treatment of sleep apnea. Many of these devices have been recalled* by the  for replacement. [facility] recommends:     1) If you are using a [employer]med device, continue using the device.  2) If you have a Daniela Respironics device, register your device for confirmation of type of device and repair of the device at [email or web address]/[email or web address]/src-update -if you cannot use link, call [phone].  The website will assist you in obtaining the serial number for registration.   3) If you are using a Daniela Respironics CPAP or Bilevel PAP device and you do not have immediate breathing, driving or cardiovascular risks without the device, consider stopping use of the device after verification that is has been recalled. Discuss this decision with your medical provider if you are uncertain about your medical risks.  4) If you are not using Respironics CPAP but are using a Respironics advanced device for breathing support (AVAPS, ASV, Bilevel PAP), continue using the device and review 5 and 6 below).     5) If you continue the device, do not include ozone generating  connected to PAP devices.  6) Bacterial filters to reduce exposure to particulates are sometimes cumbersome to use and are not currently recommended by the .    ?       You may also choose discuss with your provider alternative approaches to treatment.      *[employer] is voluntarily recalling the below devices due to two (2) issues related to the polyester-based polyurethane (PE-PUR) sound abatement foam used in Daniela Continuous and Non-Continuous Ventilators: 1) PE-PUR foam may degrade into particles which may enter the device's the air pathway and be ingested or inhaled by the user, and 2) the PE-PUR foam may off-gas certain chemicals.  The foam degradation may be exacerbated by use of unapproved cleaning methods, such as ozone (see FDA safety communication on use of Ozone ), and off-gassing may occur during initial operation and may possibly continue throughout the device's useful life.   These issues can result in serious injury which can be life-threatening, cause permanent impairment, and/or require medical intervention to preclude permanent impairment. To date, [employer] has received several complaints regarding the presence of black debris/particles within the airpath circuit (extending from the device outlet, humidifier, tubing, and mask). Daniela also has received reports of headache, upper airway irritation, cough, chest pressure and sinus infection. The potential risks of particulate exposure include: Irritation (skin, eye, and respiratory tract), inflammatory response, headache, asthma, adverse effects to other organs (e.g. kidneys and liver) and toxic carcinogenic affects. The potential risks of chemical exposure due to off-gassing include: headache/dizziness, irritation (eyes, nose, respiratory tract, skin), hypersensitivity, nausea/vomiting, toxic and carcinogenic effects. There have been no reports of death as a result of these issues.    Actions to be taken:  Discontinue the use of your device.  Do not continue to use ozone  with the device.     [employer] affected devices on the recall website, www.[employer].com/SRC-update    i. The website provides current information on the status of the recall and how  to receive permanent corrective action to address the two issues.    ii. The website also provides instructions on how to locate an affected device  Serial Number and will guide users through the registration process.    iii. In the , call [phone] Service Hotline if you cannot visit the website

## 2022-05-05 NOTE — PROGRESS NOTES
Suzy is a 36 year old who is being evaluated via a billable video visit.      How would you like to obtain your AVS? MyChart  If the video visit is dropped, the invitation should be resent by: Send to e-mail at: [email or web address]  Will anyone else be joining your video visit? No      Video Start Time: 10:36 AM  Video-Visit Details    Type of service:  Video Visit    Video End Time:11:04 AM    Originating Location (pt. Location): Home    Distant Location (provider location):  [facility]     Platform used for Video Visit: itBit     Presenting history:     Mrs. Suzy Rodríguez presents to clinic for management of obstructive sleep apnea.    Her medical history significant for adrenal cortical carcinoma, hypertension, PCOS, and gastric bypass surgery in 2016.    Patient was diagnosed with obstructive sleep apnea in 2015 after sleep study through [facility].  Available records show PSG on 11/17/2015 at a weight of 226 pounds which showed an apnea-hypopnea index of 26/h and RDI of 63/h.  Apnea hypopnea index based on 4% desaturation criteria was 15/h.  It looks like patient was in the supine position throughout the night.  CPAP titration was effective at a pressure of 11 cm H2O.    Patient started CPAP therapy after this sleep study and reports that she she had a significant benefit from treatment.  Sleep quality improved and her daytime sleepiness resolved.    After weight loss, she discontinued CPAP use, as she did not have significant symptoms anymore.    Her sleep apnea symptoms have now returned.  She complains of having disturbed and nonrefreshing sleep and excessive daytime sleepiness.  This is despite taking Adderall 20 mg daily for ADHD.    She started having sleep initiation and maintenance insomnia after cancer treatment.  Currently, she is prescribed temazepam 30 mg at bedtime, which according to her is helpful.    Suzy goes to sleep at 10:00 PM during  the week. She wakes up at 4:30 AM- 6:30 AM. She falls asleep in 15 minutes.  Suzy denies difficulty falling asleep.  She wakes up 2-4 times a night for 20- 60 minutes before falling back to sleep.  Suzy wakes up to uncertain reasons.  On weekends, Suzy goes to sleep at 10:00 PM.  She wakes up at 6:00 AM. She falls asleep in 15 minutes.      Suzy does snore frequently.  She has occasional snort arousals. Patient sleeps on her back. She has frequent morning dry mouth and morning headaches,. Suzy has frequent sleep talking and occasional hypnogogic/hypnopompic hallucinations and denies any sleep walking, dream enactment, sleep paralysis and cataplexy.     Patient's [place] Sleepiness score 17/24 consistent with excessive  daytime sleepiness.      Suzy naps 1 times per day for 120 minutes, feels refreshed after naps. She takes some inadvertant naps.  She denies closing eyes, dozing and falling asleep while driving. Patient was counseled on the importance of driving while alert, to pull over if drowsy, or nap before getting into the vehicle if sleepy.      She uses 3-4 cups/day of coffee. Last caffeine intake is usually before 2 PM.    Past Medical History:   Diagnosis Date     Adrenal cortex cancer, right (H) 6/30/2018    Resected 6/25/18, Dr. Mansfield.     Adrenal mass (H)      Anemia     thought due to heavy menses.     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  Check in May. SCP started.  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     SHREYA on CPAP 12/17/2015    Stopped using CPAP the Fall of 2016 after weight loss as even at lower pressure/adjustment couldn't tolerate the cpap. Resting well, cautioned to watch for any signs of fatigue and consider \"titration study\" in the future to see if cpap is still required at her new weight.     Pneumonia     hx of recurrent pneumonia issues/respiratory infections.     Polycystic ovary syndrome     able to get pregnant, not on meds.     Pulmonary embolism (H) 2009 or so    briefly on wafarin.     Sleep apnea     cpap     Vitamin D deficiency        Ht 1.626 m (5' 4\")   Wt 70.3 kg (155 lb)   BMI 26.61 kg/m        Exam:  Constitutional: healthy, alert and no distress  Respiratory:   Neurologic: negative findings: speech normal, mental status intact  Psychiatric: mentation appears normal and affect normal/bright      Impression & Plan:     1.  Obstructive sleep apnea  2.  Excessive daytime sleepiness    Patient had moderate obstructive sleep apnea based on her 2015 sleep study, at a weight of 226 pounds.  She had significant benefit from the use of CPAP therapy.  Patient discontinued CPAP after weight loss, but her sleep apnea symptoms have now returned.  She complains of frequent nocturnal arousals, nonrestorative sleep, daily morning headaches and excessive daytime sleepiness.  Current weight is 155 pounds.  Weight loss may have reduced her sleep apnea severity, but considering symptomatic burden and excessive daytime sleepiness, patient wants to return to CPAP therapy.  We reviewed the role of repeat testing and also alternate therapy options for sleep apnea.    Her existing CPAP device " is a Daniela Respironics device which is under  recall.  Due to her history of cancer, she does not want to expose herself to a recall device.  I recommended that she obtain a replacement CPAP and we can use auto titrating PAP therapy settings.  Patient does not want to go through another sleep study at this point.    Plan:     1. Replacement CPAP with auto titration settings 5-15 cm H2O  2. Follow up in 1-2 months after starting CPAP    I spent a total of 50 minutes for this appointment on this date of service which include time spent before, during and after the visit for chart review, patient care, counseling and coordination of care.    Dr. Ronal Patel

## 2022-05-09 NOTE — NURSING NOTE
Follow up in 1-2 months after starting CPAP. [employer] message sent.    Papito Lyn, Visit facilitator

## 2022-05-10 DIAGNOSIS — F33.1 MAJOR DEPRESSIVE DISORDER, RECURRENT EPISODE, MODERATE WITH ANXIOUS DISTRESS (H): ICD-10-CM

## 2022-05-10 NOTE — TELEPHONE ENCOUNTER
PHQ9 and Gad7 questionnaires sent to patient by [employer] to fill out and return.  Noah Wilson RN

## 2022-05-11 RX ORDER — BUPROPION HYDROCHLORIDE 150 MG/1
TABLET, EXTENDED RELEASE ORAL
Qty: 180 TABLET | Refills: 1 | Status: SHIPPED | OUTPATIENT
Start: 2022-05-11 | End: 2022-11-21

## 2022-05-11 NOTE — TELEPHONE ENCOUNTER
Routing refill request to provider for review/approval because:  PHQ 10/25/2021 10/29/2021 5/10/2022   PHQ-9 Total Score 22 23 14   Q9: Thoughts of better off dead/self-harm past 2 weeks Not at all Not at all Not at all   Some encounter information is confidential and restricted. Go to Review Flowsheets activity to see all data.     KIMBERLY-7 SCORE 9/30/2021 10/25/2021 5/10/2022   Total Score - - 12 (moderate anxiety)   Total Score 20 21 12   Some encounter information is confidential and restricted. Go to Review Flowsheets activity to see all data.       Thank you.  Noah Wilson, RN

## 2022-05-12 ENCOUNTER — LAB (OUTPATIENT)
Dept: LAB | Facility: CLINIC | Age: 37
End: 2022-05-12
Payer: COMMERCIAL

## 2022-05-12 DIAGNOSIS — C74.91: ICD-10-CM

## 2022-05-12 DIAGNOSIS — E27.40 ADRENAL INSUFFICIENCY (H): ICD-10-CM

## 2022-05-12 DIAGNOSIS — E03.8 SECONDARY HYPOTHYROIDISM: ICD-10-CM

## 2022-05-12 DIAGNOSIS — E27.40 UNSPECIFIED ADRENOCORTICAL INSUFFICIENCY (H): ICD-10-CM

## 2022-05-12 DIAGNOSIS — C74.91 MALIGNANT NEOPLASM OF ADRENAL GLAND, RIGHT (H): ICD-10-CM

## 2022-05-12 LAB
ALBUMIN SERPL-MCNC: 3.5 G/DL (ref 3.4–5)
ALP SERPL-CCNC: 138 U/L (ref 40–150)
ALT SERPL W P-5'-P-CCNC: 29 U/L (ref 0–50)
ANION GAP SERPL CALCULATED.3IONS-SCNC: 5 MMOL/L (ref 3–14)
AST SERPL W P-5'-P-CCNC: 21 U/L (ref 0–45)
BASOPHILS # BLD AUTO: 0 10E3/UL (ref 0–0.2)
BASOPHILS NFR BLD AUTO: 1 %
BILIRUB SERPL-MCNC: 0.2 MG/DL (ref 0.2–1.3)
BUN SERPL-MCNC: 6 MG/DL (ref 7–30)
CALCIUM SERPL-MCNC: 9.1 MG/DL (ref 8.5–10.1)
CHLORIDE BLD-SCNC: 107 MMOL/L (ref 94–109)
CO2 SERPL-SCNC: 27 MMOL/L (ref 20–32)
CORTIS SERPL-MCNC: 11.4 UG/DL (ref 4–22)
CORTIS SERPL-MCNC: 12.5 UG/DL (ref 4–22)
CREAT SERPL-MCNC: 0.72 MG/DL (ref 0.52–1.04)
EOSINOPHIL # BLD AUTO: 0.1 10E3/UL (ref 0–0.7)
EOSINOPHIL NFR BLD AUTO: 2 %
ERYTHROCYTE [DISTWIDTH] IN BLOOD BY AUTOMATED COUNT: 13.7 % (ref 10–15)
GFR SERPL CREATININE-BSD FRML MDRD: >90 ML/MIN/1.73M2
GLUCOSE BLD-MCNC: 86 MG/DL (ref 70–99)
HCT VFR BLD AUTO: 39.9 % (ref 35–47)
HGB BLD-MCNC: 12.7 G/DL (ref 11.7–15.7)
LYMPHOCYTES # BLD AUTO: 1 10E3/UL (ref 0.8–5.3)
LYMPHOCYTES NFR BLD AUTO: 26 %
MCH RBC QN AUTO: 28.4 PG (ref 26.5–33)
MCHC RBC AUTO-ENTMCNC: 31.8 G/DL (ref 31.5–36.5)
MCV RBC AUTO: 89 FL (ref 78–100)
MONOCYTES # BLD AUTO: 0.3 10E3/UL (ref 0–1.3)
MONOCYTES NFR BLD AUTO: 7 %
NEUTROPHILS # BLD AUTO: 2.6 10E3/UL (ref 1.6–8.3)
NEUTROPHILS NFR BLD AUTO: 65 %
PLATELET # BLD AUTO: 226 10E3/UL (ref 150–450)
POTASSIUM BLD-SCNC: 4.4 MMOL/L (ref 3.4–5.3)
PROT SERPL-MCNC: 6.5 G/DL (ref 6.8–8.8)
RBC # BLD AUTO: 4.47 10E6/UL (ref 3.8–5.2)
SODIUM SERPL-SCNC: 139 MMOL/L (ref 133–144)
T3 SERPL-MCNC: 100 NG/DL (ref 60–181)
T4 FREE SERPL-MCNC: 1.05 NG/DL (ref 0.76–1.46)
TSH SERPL DL<=0.005 MIU/L-ACNC: 0.16 MU/L (ref 0.4–4)
WBC # BLD AUTO: 4 10E3/UL (ref 4–11)

## 2022-05-12 PROCEDURE — 80375 DRUG/SUBSTANCE NOS 1-3: CPT | Mod: 90

## 2022-05-12 PROCEDURE — 80050 GENERAL HEALTH PANEL: CPT

## 2022-05-12 PROCEDURE — 82627 DEHYDROEPIANDROSTERONE: CPT

## 2022-05-12 PROCEDURE — 36415 COLL VENOUS BLD VENIPUNCTURE: CPT

## 2022-05-12 PROCEDURE — 99000 SPECIMEN HANDLING OFFICE-LAB: CPT

## 2022-05-12 PROCEDURE — 82533 TOTAL CORTISOL: CPT

## 2022-05-12 PROCEDURE — 84244 ASSAY OF RENIN: CPT | Mod: 90

## 2022-05-12 PROCEDURE — 82542 COL CHROMOTOGRAPHY QUAL/QUAN: CPT | Mod: 90

## 2022-05-12 PROCEDURE — 82024 ASSAY OF ACTH: CPT

## 2022-05-12 PROCEDURE — 84439 ASSAY OF FREE THYROXINE: CPT

## 2022-05-12 PROCEDURE — 82530 CORTISOL FREE: CPT | Mod: 90

## 2022-05-12 PROCEDURE — 82088 ASSAY OF ALDOSTERONE: CPT

## 2022-05-12 PROCEDURE — 84480 ASSAY TRIIODOTHYRONINE (T3): CPT

## 2022-05-13 LAB
ACTH PLAS-MCNC: <10 PG/ML
ALDOST SERPL-MCNC: 12.1 NG/DL (ref 0–31)
DHEA-S SERPL-MCNC: <15 UG/DL (ref 35–430)

## 2022-05-15 LAB — RENIN PLAS-CCNC: 3.4 NG/ML/HR

## 2022-05-16 ENCOUNTER — ANCILLARY PROCEDURE (OUTPATIENT)
Dept: CT IMAGING | Facility: CLINIC | Age: 37
End: 2022-05-16
Attending: INTERNAL MEDICINE
Payer: COMMERCIAL

## 2022-05-16 DIAGNOSIS — F40.240 CLAUSTROPHOBIA: ICD-10-CM

## 2022-05-16 DIAGNOSIS — F51.01 PRIMARY INSOMNIA: ICD-10-CM

## 2022-05-16 DIAGNOSIS — C74.01 MALIGNANT NEOPLASM OF CORTEX OF RIGHT ADRENAL GLAND (H): ICD-10-CM

## 2022-05-16 DIAGNOSIS — F41.9 ANXIETY: ICD-10-CM

## 2022-05-16 DIAGNOSIS — G47.33 OSA (OBSTRUCTIVE SLEEP APNEA): ICD-10-CM

## 2022-05-16 PROCEDURE — 74177 CT ABD & PELVIS W/CONTRAST: CPT | Performed by: RADIOLOGY

## 2022-05-16 PROCEDURE — 71260 CT THORAX DX C+: CPT | Performed by: RADIOLOGY

## 2022-05-16 RX ORDER — IOPAMIDOL 755 MG/ML
95 INJECTION, SOLUTION INTRAVASCULAR ONCE
Status: COMPLETED | OUTPATIENT
Start: 2022-05-16 | End: 2022-05-16

## 2022-05-16 RX ADMIN — IOPAMIDOL 95 ML: 755 INJECTION, SOLUTION INTRAVASCULAR at 15:10

## 2022-05-17 LAB
COLLECT DURATION TIME UR: 24 H
CORTIS 24H UR-MRATE: 68 MCG/24 H (ref 3.5–45)
CORTISONE 24H UR-MRATE: 120 MCG/24 H (ref 17–129)
SPECIMEN VOL 24H UR: 2000 ML

## 2022-05-18 ENCOUNTER — VIRTUAL VISIT (OUTPATIENT)
Dept: ONCOLOGY | Facility: CLINIC | Age: 37
End: 2022-05-18
Attending: INTERNAL MEDICINE
Payer: COMMERCIAL

## 2022-05-18 DIAGNOSIS — C74.01 MALIGNANT NEOPLASM OF CORTEX OF RIGHT ADRENAL GLAND (H): Primary | ICD-10-CM

## 2022-05-18 DIAGNOSIS — G47.33 OSA (OBSTRUCTIVE SLEEP APNEA): ICD-10-CM

## 2022-05-18 DIAGNOSIS — F51.01 PRIMARY INSOMNIA: ICD-10-CM

## 2022-05-18 DIAGNOSIS — E27.40 ADRENAL INSUFFICIENCY (H): ICD-10-CM

## 2022-05-18 DIAGNOSIS — F41.9 ANXIETY: ICD-10-CM

## 2022-05-18 PROCEDURE — 99213 OFFICE O/P EST LOW 20 MIN: CPT | Mod: 95 | Performed by: INTERNAL MEDICINE

## 2022-05-18 NOTE — LETTER
"    5/18/2022         RE: Suzy Rodríguez  [address]  [facility] [address]        Dear Colleague,    Thank you for referring your patient, Suzy Rodríguez, to the [facility]. Please see a copy of my visit note below.        [facility] Oncology Followup  May 18, 2022     REFERRING PROVIDER: Warren Mansfield MD from [facility]rolog[facility] Oncology clinic.      REASON FOR CURRENT VISIT: Follow-up for adrenocortical carcinoma,    ONCOLOGIC HISTORY:  1. 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   - CT abdomen and pelvis stone protocol without contrast 5 0911 showed \"9.2 x 8 cm heterogeneous right upper quadrant mass with small foci of calcification within it which is likely arising from the adrenal gland though inseparable from liver and upper pole of right kidney. It is incompletely evaluated on this noncontrast study. 3 x 2.6 cm mass right lobe of liver on image #85 also incompletely evaluated. 6 x 4 x 4 mm upper third left ureteral obstructing stone with mild to moderate secondary hydronephrosis. Collection of stones at lower pole left kidney extending over an area of approximately 6 x 7 x 4 mm. Additional nonobstructing 1 mm stone upper pole left kidney. 2 mm nonobstructing stone noted upper pole right kidney with no hydronephrosis on the right. Postop change consistent with gastric bypass. Noncontrast images of spleen, left adrenal gland, gallbladder, and pancreas unremarkable. No aneurysm. No adenopathy.\"  - Seen by Dr. Delvalle at [facility] Urology clinic. Referred to Dr. Alvino Patel and then Dr. Warren Mansfield.  - Endocrinology team directed workup showed high DHEAS level of 740 pre-surgery.   - Right open adrenalectomy and hepatic mobilization performed by Dr. Warren Mansfield on 6/25/2018. " Pathology showed adrenocortical carcinoma measuring 11.3 cm, weighing 413 g with extension into or through the adrenal capsule negative lymphovascular invasion, tumor necrosis present, margins involved by tumor, no regional lymph nodes identified, pathologic stage T2 NX.  pathology review reveals low grade ACC.  - DHEAS normalized postsurgery.   - Adjuvant Mitotane started on 7/24/18. Dose increased to 2000mg TID around 10/23/18 due to persistently low troughs. Held 1/16/19 for two weeks and resumed 1/30 at 1000 mg po BID due to very poor tolerance of higher doses. Highest mitotane level was 10.2 on 2/11/19. Mitotane troughs did not rise above 10 despite increase in dose to 1500 BID and then 1500+2000. Started 2000mg BID on 10/28/20. Increased to [id number] since around Christmas 2020.   - Saw radiation oncology at [facility], radiation oncology at [facility], as well as endocrine oncology (Dr. Huertas) at [facility].   - S/p adjuvant RT by Dr. Monsivais - 5040 cGy over 30 fr (8/24/18-10/6/18).  - 2/11/19: OSH CT C/A/P and MRI brain with contrast - no evidence of disease recurrence.   - 06/08/20, 09/14/20, 12/7/20, 3/16/2021: CT C/A/P with contrast - AFUA.    INTERVAL HISTORY:  Ms. Rodríguez is a 36 year old woman with history notable for right-sided functioning adrenocortical carcinoma (diagnosed in May 2018), who was  on adjuvant mitotane until May 2021.     She is being followed over a video visit.    She has been off mitotane now for a year.  She is doing well overall.  She does not have any new complaints at this visit.    She continues to have some headaches mostly in the morning.  She suspects that her sleep apnea could be contributing to her memory issue and headaches.  She was previously diagnosed with sleep apnea and has been using a BiPAP machine which she has quit a while ago.  She is going to restart using the CPAP machine again.    She is being seen with labs and  "restaging scans for a scheduled follow-up visit.    REVIEW OF SYSTEMS: 14 point ROS negative other than the symptoms noted above in the HPI.    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 Mansfield.     Adrenal mass (H)      Anemia     thought due to heavy menses.     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  Check in May. SCP started.  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     SHREYA on CPAP 12/17/2015    Stopped using CPAP the Fall of 2016 after weight loss as even at lower pressure/adjustment couldn't tolerate the cpap. Resting well, cautioned to watch for any signs of fatigue and consider \"titration study\" in the future to see if cpap is still required at her new weight.     Pneumonia     hx of recurrent pneumonia issues/respiratory infections.     Polycystic ovary syndrome     able to get pregnant, not on meds.     Pulmonary embolism (H) 2009 or so    briefly on wafarin.     Sleep apnea     cpap     Vitamin D deficiency    MHTFR mutation with h/o mutiple miscarriages.    PAST SURGICAL HISTORY:   Past Surgical History:   Procedure " Laterality Date     ADRENALECTOMY Right 2018    Procedure: ADRENALECTOMY;  Right Adrenalectomy,  Embolize Liver , Anesthesia Block;  Surgeon: Warren Mansfield MD;  Location: [facility] OR     ADRENALECTOMY       ADRENALECTOMY Right       SECTION      twice      SECTION      2      SECTION      x2     EMBOLECTOMY ABDOMEN N/A 2018    Procedure: EMBOLECTOMY ABDOMEN;;  Surgeon: Ector Mcintyre MD;  Location: [facility] OR     EXTRACORPOREAL SHOCK WAVE LITHOTRIPSY (ESWL)       GASTRIC BYPASS       NM CYSTO/URETERO W/LITHOTRIPSY &INDWELL STENT INSRT Left 2018    Procedure: CYSTOSCOPY, LEFT URETEROSCOPY LASER LITHOTRIPSY STENT INSERTION;  Surgeon: Skyler Delvalle MD;  Location: [facility] OR;  Service: Urology     NM LAP GASTRIC BYPASS/DERICK-EN-Y N/A 2016    Procedure: GASTRIC BYPASS LAPAROSCOPIC DERICK-EN-Y;  Surgeon: Randy Sutherland MD;  Location: [facility];  Service: General     TUBAL LIGATION       WISDOM TOOTH EXTRACTION Bilateral       SOCIAL HISTORY:   Social History     Tobacco Use     Smoking status: Former Smoker     Smokeless tobacco: Never Used     Tobacco comment: vapes   Vaping Use     Vaping Use: Never used   Substance Use Topics     Alcohol use: Yes     Comment: occ.     Drug use: No     FAMILY HISTORY:   Family History   Problem Relation Age of Onset     Depression Paternal Grandmother      Diabetes Mother      Diabetes Father      Hypertension Father      Chronic Obstructive Pulmonary Disease Father      Cancer Maternal Grandmother         gastric     Urolithiasis Maternal Grandmother      Heart Disease Maternal Grandfather      Cancer Maternal Grandfather         lung     Heart Disease Paternal Grandfather      Breast Cancer No family hx of      Colon Cancer No family hx of      Prostate Cancer No family hx of      Cerebrovascular Disease No family hx of      Clotting Disorder No family hx of      Gout No family hx of      ALLERGIES:    Allergies   Allergen Reactions     Bee Venom Swelling     Ibuprofen Hives and Swelling     CURRENT MEDICATIONS:   Current Outpatient Medications   Medication Instructions     acetaminophen (TYLENOL) 650 mg, Oral, EVERY 4 HOURS PRN     buPROPion (WELLBUTRIN SR) 150 mg, Oral     calcitRIOL (ROCALTROL) 0.5 MCG capsule No dose, route, or frequency recorded.     calcium carbonate 500 mg (elemental) (OSCAL) 500 mg, Oral, 2 TIMES DAILY     clonazePAM (KLONOPIN) 0.5 mg, Oral, 2 TIMES DAILY PRN     diazepam (VALIUM) 5 mg, Oral, ONCE PRN     fludrocortisone (FLORINEF) 0.2 mg, Oral, DAILY     FLUoxetine (PROZAC) 60 mg, Oral, DAILY     gabapentin (NEURONTIN) 900 mg, Oral, 3 TIMES DAILY     hydrocortisone (CORTEF) 10 MG tablet Take 3 tablets (30 MG) every morning and take 2 tablets (20 MG) by mouth every day at 2 PM. Additional as directed.     levothyroxine (SYNTHROID/LEVOTHROID) 125 mcg, Oral, DAILY     ondansetron (ZOFRAN) 8 mg, Oral, EVERY 8 HOURS PRN     prochlorperazine (COMPAZINE) 5 mg, Oral, EVERY 8 HOURS PRN     temazepam (RESTORIL) 15 mg, Oral, AT BEDTIME PRN     UNABLE TO FIND Other, medical cannabis (Patient's own supply. Not a prescription)       PHYSICAL EXAMINATION:  Vital signs: There were no vitals taken for this visit.   Gen: NAD, on video no distress  No other exam    Recent Labs   Lab Test 05/12/22  1017 03/24/22  1102 03/24/22  0728 02/08/22  0903 01/18/22  1607    138 140 139 138   POTASSIUM 4.4 3.6 3.9 3.8 4.4   CHLORIDE 107 102 107 104 107   CO2 27 29 30 27 27   ANIONGAP 5 7 3 8 4   BUN 6* 12 12 10 9   CR 0.72 0.80 0.78 0.71 0.74   GLC 86 70 88 88 87   SHASHA 9.1 8.8 9.1 8.6 8.5     No results for input(s): MAG, PHOS in the last 60174 hours.  Recent Labs   Lab Test 05/12/22  1017 03/24/22  0822 02/08/22  0903 01/18/22  1607 12/31/21  1348 10/25/21  0919   WBC 4.0 4.3 6.0 6.3 5.2 4.2   HGB 12.7 13.8 13.6 13.8 13.8 14.1    215 185 252 166 201   MCV 89 92 92 91 95 94   NEUTROPHIL 65 54 72  --  79  68     Recent Labs   Lab Test 05/12/22  1017 03/24/22  1102 02/08/22  0903   BILITOTAL 0.2 0.3 0.2   ALKPHOS 138 134 118   ALT 29 33 32   AST 21 22 25   ALBUMIN 3.5 3.6 3.4     TSH   Date Value Ref Range Status   05/12/2022 0.16 (L) 0.40 - 4.00 mU/L Final   03/24/2022 0.08 (L) 0.40 - 4.00 mU/L Final   02/08/2022 0.29 (L) 0.40 - 4.00 mU/L Final   07/05/2021 0.22 (L) 0.40 - 4.00 mU/L Final   06/04/2021 0.44 0.40 - 4.00 mU/L Final   05/10/2021 0.18 (L) 0.40 - 4.00 mU/L Final     No results for input(s): CEA in the last 44286 hours.  Results for orders placed or performed in visit on 05/16/22   CT Chest/Abdomen/Pelvis w Contrast    Narrative    EXAM: CT CHEST/ABDOMEN/PELVIS W CONTRAST  LOCATION: [facility]  DATE/TIME: 5/16/2022 2:45 PM    INDICATION: Adrenocortical carcinoma surveillance  COMPARISON: Multiple priors with the most recent dated 02/08/2022  TECHNIQUE: CT scan of the chest, abdomen, and pelvis was performed following injection of IV contrast. Multiplanar reformats were obtained. Dose reduction techniques were used.   CONTRAST: Isovue 370 95cc    FINDINGS:   LUNGS AND PLEURA: Patent central airways. No new noncalcified pulmonary nodule. Unchanged minimal thickening along the left major fissure, which likely reflects a benign intrapulmonary lymph node. Unchanged scarring in the right lower lobe. No new   pleural effusion. No pneumothorax.    MEDIASTINUM/AXILLAE: No intrathoracic lymphadenopathy. No incidentally noted central pulmonary embolus. Normal caliber thoracic aorta. Normal heart size. No pancreatic or effusion. Normal esophagus.    CORONARY ARTERY CALCIFICATION: None.    HEPATOBILIARY: Benign inferior right hepatic hemangioma. No new focal hepatic mass. No biliary dilatation. No calcified gallstones.    PANCREAS: Homogeneous enhancement. No focal pancreatic mass. No peripancreatic inflammation. No pancreatic ductal dilatation.    SPLEEN: Normal in  size.    ADRENAL GLANDS: Prior right adrenalectomy. No new soft tissue nodule in the surgical bed. Unremarkable left adrenal gland.    KIDNEYS/BLADDER: Stable focal scarring and hypoenhancement in the superior right renal pole. No new suspicious renal mass. No collecting system dilatation. No urinary bladder wall thickening.    BOWEL: Prior gastric bypass. Conspicuous polypoid tissue in the proximal duodenum measuring 1.2 x 1.1 cm, which previously measured 1.2 x 1.0 cm (series 3, image 308). No dilated loops of small bowel to suggest an obstruction. Normal caliber appendix.   Unremarkable appearance of the colon.    LYMPH NODES: No new abdominopelvic lymphadenopathy.    VASCULATURE: Normal caliber abdominal aorta. Patent portal, splenic, and superior mesenteric veins. Patent bilateral renal veins and IVC.    PELVIC ORGANS: Left adnexal cyst measuring 2.8 cm, previously 3.1 cm. Indwelling IUD.    MUSCULOSKELETAL: Tiny fat filled ventral abdominal wall hernia. No aggressive osseous lesion.      Impression    IMPRESSION:  1.  No definite findings to suggest metastatic disease in the chest, abdomen, or pelvis.  2.  Relatively stable polypoid tissue in the proximal duodenum. Continued attention on the follow-up is recommended.  3.  Decreased size of the left adnexal cyst, likely benign in a premenopausal female.     *Note: Due to a large number of results and/or encounters for the requested time period, some results have not been displayed. A complete set of results can be found in Results Review.        ASSESSMENT AND PLAN: A 36 year old  female with right-sided functioning high-risk adrenocortical carcinoma.     Adrenocortical carcinoma:  - Started on adjuvant mitotane in July 2018 based on her presentation and tumor characteristics including invasion of the adrenal capsule, high mitotic rate, possibly positive margins, and high Ki67, which could result in a rate of recurrence as high as 40%.    The mitotane was  stopped by Dr. Liang and endocrinologic oncologist from [facility] in May 2021.  The plan has been to continue with mitotane levels every month and interval imaging every 3 months for now.    I have reviewed all of the labs done prior to this clinic visit.  Labs are all completely normal including electrolytes, renal function, hepatic panel, complete blood count and differential. Her TSH is low but her fT3 and fT4 are normal. She does follow Dr. Jaquez in endocrinology.     I have reviewed actual images from her restaging scans. There is nothing to suggest recurrent disease. She has stable polypoid tissue in duodenum which we would continue to monitor.     --We will continue with scans in three months for now and they will be spaced out to every four months some time later this year.  --will continue to follow with Dr Liang ([facility]). Her next visit is expected in August though she does not have a date as yet.    We will continue to hydrocortisone at 15 mg in am and 10 mg in pm. She did follow with Dr. Jaquez in endocrinology who has suggested decreasing the dose but incidentally suggested the same dose. I have encouraged her to check with Dr. Jaquez.     Mild neuropathy pain,      She continues to have headaches and difficulty with remembering things.  She suspects that her sleep apnea could be contributing.  She had previous obstructive sleep apnea and had been on CPAP.  She is planning to restart using CPAP machine and see if this helps.      I will see her again in 3 months with labs and restaging scans few days prior to clinic visit.    Video-Visit Details    Type of service:  Video Visit  Originating Location (pt. Location): Home  Distant Location (provider location):  [facility]   Platform used for Video Visit: [employer]    25 minutes spent on the date of the encounter doing chart review, history and exam, documentation and further activities as  noted above        Tru Hernadez    Hematologist and Medical Oncologist  [facility]

## 2022-05-18 NOTE — PROGRESS NOTES
Suzy is a 36 year old who is being evaluated via a billable video visit.      Suzy Rodríguez stated she is in the state of [place] for the visit today.    How would you like to obtain your AVS? MyChart  If the video visit is dropped, the invitation should be resent by: Text to cell phone: [phone]  Will anyone else be joining your video visit? No    Cris Amezquita Virtual Visit Facilitator        Video Start Time: 2:49 PM  Video-Visit Details    Type of service:  Video Visit  Originating Location (pt. Location): Home  Distant Location (provider location):  [facility]   Platform used for Video Visit: [employer]

## 2022-05-18 NOTE — NURSING NOTE
Suzy Rodríguez verified meds and allergies are correct via patients echeck in. No changes at this time.    Cris Amezquita, Virtual Facilitator

## 2022-05-18 NOTE — PROGRESS NOTES
"  [facility] Oncology Followup  May 18, 2022     REFERRING PROVIDER: Warren Mansfield MD from [facility] Urologic Oncology clinic.      REASON FOR CURRENT VISIT: Follow-up for adrenocortical carcinoma,    ONCOLOGIC HISTORY:  1. 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   - CT abdomen and pelvis stone protocol without contrast 5 8011 showed \"9.2 x 8 cm heterogeneous right upper quadrant mass with small foci of calcification within it which is likely arising from the adrenal gland though inseparable from liver and upper pole of right kidney. It is incompletely evaluated on this noncontrast study. 3 x 2.6 cm mass right lobe of liver on image #85 also incompletely evaluated. 6 x 4 x 4 mm upper third left ureteral obstructing stone with mild to moderate secondary hydronephrosis. Collection of stones at lower pole left kidney extending over an area of approximately 6 x 7 x 4 mm. Additional nonobstructing 1 mm stone upper pole left kidney. 2 mm nonobstructing stone noted upper pole right kidney with no hydronephrosis on the right. Postop change consistent with gastric bypass. Noncontrast images of spleen, left adrenal gland, gallbladder, and pancreas unremarkable. No aneurysm. No adenopathy.\"  - Seen by Dr. Delvalle at [facility] clinic. Referred to Dr. Alvino Patel and then Dr. Warren Mansfield.  - Endocrinology team directed workup showed high DHEAS level of 740 pre-surgery.   - Right open adrenalectomy and hepatic mobilization performed by Dr. Warren Mansfield on 6/25/2018. Pathology showed adrenocortical carcinoma measuring 11.3 cm, weighing 413 g with extension into or through the adrenal capsule negative lymphovascular invasion, tumor necrosis present, margins involved by tumor, no regional lymph nodes identified, " pathologic stage T2 NX.  pathology review reveals low grade ACC.  - DHEAS normalized postsurgery.   - Adjuvant Mitotane started on 7/24/18. Dose increased to 2000mg TID around 10/23/18 due to persistently low troughs. Held 1/16/19 for two weeks and resumed 1/30 at 1000 mg po BID due to very poor tolerance of higher doses. Highest mitotane level was 10.2 on 2/11/19. Mitotane troughs did not rise above 10 despite increase in dose to 1500 BID and then 1500+2000. Started 2000mg BID on 10/28/20. Increased to [phone] since around Christmas 2020.   - Saw radiation oncology at [facility], radiation oncology at [facility], as well as endocrine oncology (Dr. Huertas) at [facility].   - S/p adjuvant RT by Dr. Monsivais - 5040 cGy over 30 fr (8/24/18-10/6/18).  - 2/11/19: OSH CT C/A/P and MRI brain with contrast - no evidence of disease recurrence.   - 06/08/20, 09/14/20, 12/7/20, 3/16/2021: CT C/A/P with contrast - AFUA.    INTERVAL HISTORY:  Ms. Rodríguez is a 36 year old woman with history notable for right-sided functioning adrenocortical carcinoma (diagnosed in May 2018), who was  on adjuvant mitotane until May 2021.     She is being followed over a video visit.    She has been off mitotane now for a year.  She is doing well overall.  She does not have any new complaints at this visit.    She continues to have some headaches mostly in the morning.  She suspects that her sleep apnea could be contributing to her memory issue and headaches.  She was previously diagnosed with sleep apnea and has been using a BiPAP machine which she has quit a while ago.  She is going to restart using the CPAP machine again.    She is being seen with labs and restaging scans for a scheduled follow-up visit.    REVIEW OF SYSTEMS: 14 point ROS negative other than the symptoms noted above in the HPI.    PAST MEDICAL HISTORY:  Past Medical History:   Diagnosis Date     Adrenal cortex cancer, right (H)  "6/30/2018    Resected 6/25/18, Dr. Mansfield.     Adrenal mass (H)      Anemia     thought due to heavy menses.     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  Check in May. SCP started.  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     SHREYA on CPAP 12/17/2015    Stopped using CPAP the Fall of 2016 after weight loss as even at lower pressure/adjustment couldn't tolerate the cpap. Resting well, cautioned to watch for any signs of fatigue and consider \"titration study\" in the future to see if cpap is still required at her new weight.     Pneumonia     hx of recurrent pneumonia issues/respiratory infections.     Polycystic ovary syndrome     able to get pregnant, not on meds.     Pulmonary embolism (H) 2009 or so    briefly on wafarin.     Sleep apnea     cpap     Vitamin D deficiency    MHTFR mutation with h/o mutiple miscarriages.    PAST SURGICAL HISTORY:   Past Surgical History:   Procedure Laterality Date     ADRENALECTOMY Right 6/25/2018    Procedure: ADRENALECTOMY;  Right Adrenalectomy,  Embolize Liver , Anesthesia Block;  Surgeon: Warren Mansfield MD;  Location: [facility] OR     ADRENALECTOMY       ADRENALECTOMY Right      "  SECTION      twice      SECTION      2      SECTION      x2     EMBOLECTOMY ABDOMEN N/A 2018    Procedure: EMBOLECTOMY ABDOMEN;;  Surgeon: Ector Mcintyre MD;  Location: [facility] OR     EXTRACORPOREAL SHOCK WAVE LITHOTRIPSY (ESWL)       GASTRIC BYPASS       IL CYSTO/URETERO W/LITHOTRIPSY &INDWELL STENT INSRT Left 2018    Procedure: CYSTOSCOPY, LEFT URETEROSCOPY LASER LITHOTRIPSY STENT INSERTION;  Surgeon: Skyler Delvalle MD;  Location: [facility];  Service: Urology     IL LAP GASTRIC BYPASS/DERICK-EN-Y N/A 2016    Procedure: GASTRIC BYPASS LAPAROSCOPIC DERICK-EN-Y;  Surgeon: Randy Sutherland MD;  Location: [facility];  Service: General     TUBAL LIGATION       WISDOM TOOTH EXTRACTION Bilateral       SOCIAL HISTORY:   Social History     Tobacco Use     Smoking status: Former Smoker     Smokeless tobacco: Never Used     Tobacco comment: vapes   Vaping Use     Vaping Use: Never used   Substance Use Topics     Alcohol use: Yes     Comment: occ.     Drug use: No     FAMILY HISTORY:   Family History   Problem Relation Age of Onset     Depression Paternal Grandmother      Diabetes Mother      Diabetes Father      Hypertension Father      Chronic Obstructive Pulmonary Disease Father      Cancer Maternal Grandmother         gastric     Urolithiasis Maternal Grandmother      Heart Disease Maternal Grandfather      Cancer Maternal Grandfather         lung     Heart Disease Paternal Grandfather      Breast Cancer No family hx of      Colon Cancer No family hx of      Prostate Cancer No family hx of      Cerebrovascular Disease No family hx of      Clotting Disorder No family hx of      Gout No family hx of      ALLERGIES:   Allergies   Allergen Reactions     Bee Venom Swelling     Ibuprofen Hives and Swelling     CURRENT MEDICATIONS:   Current Outpatient Medications   Medication Instructions     acetaminophen (TYLENOL) 650 mg, Oral, EVERY 4 HOURS PRN     buPROPion  (WELLBUTRIN SR) 150 mg, Oral     calcitRIOL (ROCALTROL) 0.5 MCG capsule No dose, route, or frequency recorded.     calcium carbonate 500 mg (elemental) (OSCAL) 500 mg, Oral, 2 TIMES DAILY     clonazePAM (KLONOPIN) 0.5 mg, Oral, 2 TIMES DAILY PRN     diazepam (VALIUM) 5 mg, Oral, ONCE PRN     fludrocortisone (FLORINEF) 0.2 mg, Oral, DAILY     FLUoxetine (PROZAC) 60 mg, Oral, DAILY     gabapentin (NEURONTIN) 900 mg, Oral, 3 TIMES DAILY     hydrocortisone (CORTEF) 10 MG tablet Take 3 tablets (30 MG) every morning and take 2 tablets (20 MG) by mouth every day at 2 PM. Additional as directed.     levothyroxine (SYNTHROID/LEVOTHROID) 125 mcg, Oral, DAILY     ondansetron (ZOFRAN) 8 mg, Oral, EVERY 8 HOURS PRN     prochlorperazine (COMPAZINE) 5 mg, Oral, EVERY 8 HOURS PRN     temazepam (RESTORIL) 15 mg, Oral, AT BEDTIME PRN     UNABLE TO FIND Other, medical cannabis (Patient's own supply. Not a prescription)       PHYSICAL EXAMINATION:  Vital signs: There were no vitals taken for this visit.   Gen: NAD, on video no distress  No other exam    Recent Labs   Lab Test 05/12/22  1017 03/24/22  1102 03/24/22  0728 02/08/22  0903 01/18/22  1607    138 140 139 138   POTASSIUM 4.4 3.6 3.9 3.8 4.4   CHLORIDE 107 102 107 104 107   CO2 27 29 30 27 27   ANIONGAP 5 7 3 8 4   BUN 6* 12 12 10 9   CR 0.72 0.80 0.78 0.71 0.74   GLC 86 70 88 88 87   SHASHA 9.1 8.8 9.1 8.6 8.5     No results for input(s): MAG, PHOS in the last 67507 hours.  Recent Labs   Lab Test 05/12/22  1017 03/24/22  0822 02/08/22  0903 01/18/22  1607 12/31/21  1348 10/25/21  0919   WBC 4.0 4.3 6.0 6.3 5.2 4.2   HGB 12.7 13.8 13.6 13.8 13.8 14.1    215 185 252 166 201   MCV 89 92 92 91 95 94   NEUTROPHIL 65 54 72  --  79 68     Recent Labs   Lab Test 05/12/22  1017 03/24/22  1102 02/08/22  0903   BILITOTAL 0.2 0.3 0.2   ALKPHOS 138 134 118   ALT 29 33 32   AST 21 22 25   ALBUMIN 3.5 3.6 3.4     TSH   Date Value Ref Range Status   05/12/2022 0.16 (L) 0.40 - 4.00  mU/L Final   03/24/2022 0.08 (L) 0.40 - 4.00 mU/L Final   02/08/2022 0.29 (L) 0.40 - 4.00 mU/L Final   07/05/2021 0.22 (L) 0.40 - 4.00 mU/L Final   06/04/2021 0.44 0.40 - 4.00 mU/L Final   05/10/2021 0.18 (L) 0.40 - 4.00 mU/L Final     No results for input(s): CEA in the last 54792 hours.  Results for orders placed or performed in visit on 05/16/22   CT Chest/Abdomen/Pelvis w Contrast    Narrative    EXAM: CT CHEST/ABDOMEN/PELVIS W CONTRAST  LOCATION: [facility]  DATE/TIME: 5/16/2022 2:45 PM    INDICATION: Adrenocortical carcinoma surveillance  COMPARISON: Multiple priors with the most recent dated 02/08/2022  TECHNIQUE: CT scan of the chest, abdomen, and pelvis was performed following injection of IV contrast. Multiplanar reformats were obtained. Dose reduction techniques were used.   CONTRAST: Isovue 370 95cc    FINDINGS:   LUNGS AND PLEURA: Patent central airways. No new noncalcified pulmonary nodule. Unchanged minimal thickening along the left major fissure, which likely reflects a benign intrapulmonary lymph node. Unchanged scarring in the right lower lobe. No new   pleural effusion. No pneumothorax.    MEDIASTINUM/AXILLAE: No intrathoracic lymphadenopathy. No incidentally noted central pulmonary embolus. Normal caliber thoracic aorta. Normal heart size. No pancreatic or effusion. Normal esophagus.    CORONARY ARTERY CALCIFICATION: None.    HEPATOBILIARY: Benign inferior right hepatic hemangioma. No new focal hepatic mass. No biliary dilatation. No calcified gallstones.    PANCREAS: Homogeneous enhancement. No focal pancreatic mass. No peripancreatic inflammation. No pancreatic ductal dilatation.    SPLEEN: Normal in size.    ADRENAL GLANDS: Prior right adrenalectomy. No new soft tissue nodule in the surgical bed. Unremarkable left adrenal gland.    KIDNEYS/BLADDER: Stable focal scarring and hypoenhancement in the superior right renal pole. No new suspicious renal  mass. No collecting system dilatation. No urinary bladder wall thickening.    BOWEL: Prior gastric bypass. Conspicuous polypoid tissue in the proximal duodenum measuring 1.2 x 1.1 cm, which previously measured 1.2 x 1.0 cm (series 3, image 308). No dilated loops of small bowel to suggest an obstruction. Normal caliber appendix.   Unremarkable appearance of the colon.    LYMPH NODES: No new abdominopelvic lymphadenopathy.    VASCULATURE: Normal caliber abdominal aorta. Patent portal, splenic, and superior mesenteric veins. Patent bilateral renal veins and IVC.    PELVIC ORGANS: Left adnexal cyst measuring 2.8 cm, previously 3.1 cm. Indwelling IUD.    MUSCULOSKELETAL: Tiny fat filled ventral abdominal wall hernia. No aggressive osseous lesion.      Impression    IMPRESSION:  1.  No definite findings to suggest metastatic disease in the chest, abdomen, or pelvis.  2.  Relatively stable polypoid tissue in the proximal duodenum. Continued attention on the follow-up is recommended.  3.  Decreased size of the left adnexal cyst, likely benign in a premenopausal female.     *Note: Due to a large number of results and/or encounters for the requested time period, some results have not been displayed. A complete set of results can be found in Results Review.        ASSESSMENT AND PLAN: A 36 year old  female with right-sided functioning high-risk adrenocortical carcinoma.     Adrenocortical carcinoma:  - Started on adjuvant mitotane in July 2018 based on her presentation and tumor characteristics including invasion of the adrenal capsule, high mitotic rate, possibly positive margins, and high Ki67, which could result in a rate of recurrence as high as 40%.    The mitotane was stopped by Dr. Liang and endocrinologic oncologist from [facility] in May 2021.  The plan has been to continue with mitotane levels every month and interval imaging every 3 months for now.    I have reviewed all of the labs done prior to this  clinic visit.  Labs are all completely normal including electrolytes, renal function, hepatic panel, complete blood count and differential. Her TSH is low but her fT3 and fT4 are normal. She does follow Dr. Jaquez in endocrinology.     I have reviewed actual images from her restaging scans. There is nothing to suggest recurrent disease. She has stable polypoid tissue in duodenum which we would continue to monitor.     --We will continue with scans in three months for now and they will be spaced out to every four months some time later this year.  --will continue to follow with Dr Liang ([facility]). Her next visit is expected in August though she does not have a date as yet.    We will continue to hydrocortisone at 15 mg in am and 10 mg in pm. She did follow with Dr. Jaquez in endocrinology who has suggested decreasing the dose but incidentally suggested the same dose. I have encouraged her to check with Dr. Jaquez.     Mild neuropathy pain,      She continues to have headaches and difficulty with remembering things.  She suspects that her sleep apnea could be contributing.  She had previous obstructive sleep apnea and had been on CPAP.  She is planning to restart using CPAP machine and see if this helps.      I will see her again in 3 months with labs and restaging scans few days prior to clinic visit.    Video-Visit Details    Type of service:  Video Visit  Originating Location (pt. Location): Home  Distant Location (provider location):  [facility] [place]   Platform used for Video Visit: [employer]    25 minutes spent on the date of the encounter doing chart review, history and exam, documentation and further activities as noted above      Tru Hernadez    Hematologist and Medical Oncologist  [facility]

## 2022-05-19 ENCOUNTER — MYC MEDICAL ADVICE (OUTPATIENT)
Dept: ENDOCRINOLOGY | Facility: CLINIC | Age: 37
End: 2022-05-19
Payer: COMMERCIAL

## 2022-05-19 DIAGNOSIS — E27.40 ADRENAL INSUFFICIENCY (H): ICD-10-CM

## 2022-05-19 DIAGNOSIS — E03.8 SECONDARY HYPOTHYROIDISM: Primary | ICD-10-CM

## 2022-05-20 RX ORDER — HYDROCORTISONE 5 MG/1
TABLET ORAL
Qty: 300 TABLET | Refills: 1 | Status: SHIPPED | OUTPATIENT
Start: 2022-05-20 | End: 2022-10-04

## 2022-05-20 RX ORDER — LEVOTHYROXINE SODIUM 75 UG/1
75 TABLET ORAL DAILY
Qty: 90 TABLET | Refills: 1 | Status: SHIPPED | OUTPATIENT
Start: 2022-05-20 | End: 2022-10-04

## 2022-05-20 NOTE — TELEPHONE ENCOUNTER
Pt called.  The lab work from 5/12 was done 3 to 4 hours after taking the morning dose of hydrocortisone at 15 mg.  Recommendations:  Decrease the dose of hydrocortisone to 10 mg in the morning and 5 mg in the afternoon, around 2 PM.  Decrease the dose of levothyroxine from 100 to 75 mcg daily  Schedule lab work in 5 weeks, in the morning, prior to taking the morning dose of hydrocortisone.

## 2022-05-23 ENCOUNTER — MYC REFILL (OUTPATIENT)
Dept: FAMILY MEDICINE | Facility: CLINIC | Age: 37
End: 2022-05-23
Payer: COMMERCIAL

## 2022-05-23 DIAGNOSIS — F98.8 ATTENTION DEFICIT DISORDER, UNSPECIFIED HYPERACTIVITY PRESENCE: ICD-10-CM

## 2022-05-23 RX ORDER — DEXTROAMPHETAMINE SACCHARATE, AMPHETAMINE ASPARTATE MONOHYDRATE, DEXTROAMPHETAMINE SULFATE AND AMPHETAMINE SULFATE 5; 5; 5; 5 MG/1; MG/1; MG/1; MG/1
20 CAPSULE, EXTENDED RELEASE ORAL DAILY
Qty: 30 CAPSULE | Refills: 0 | Status: SHIPPED | OUTPATIENT
Start: 2022-05-23 | End: 2022-06-22

## 2022-05-24 LAB — MITOTANE SERPL-MCNC: NORMAL MCG/ML

## 2022-05-31 ENCOUNTER — TELEPHONE (OUTPATIENT)
Dept: SLEEP MEDICINE | Facility: CLINIC | Age: 37
End: 2022-05-31
Payer: COMMERCIAL

## 2022-05-31 NOTE — TELEPHONE ENCOUNTER
Received Rx for a replacement CPAP and left message for patient to return call if she would like to transfer CPAP care to [facility].

## 2022-06-07 ENCOUNTER — MYC REFILL (OUTPATIENT)
Dept: ONCOLOGY | Facility: CLINIC | Age: 37
End: 2022-06-07
Payer: COMMERCIAL

## 2022-06-07 DIAGNOSIS — C74.01 MALIGNANT NEOPLASM OF CORTEX OF RIGHT ADRENAL GLAND (H): ICD-10-CM

## 2022-06-07 DIAGNOSIS — F51.01 PRIMARY INSOMNIA: ICD-10-CM

## 2022-06-08 RX ORDER — TEMAZEPAM 15 MG/1
15-30 CAPSULE ORAL
Qty: 60 CAPSULE | Refills: 0 | Status: SHIPPED | OUTPATIENT
Start: 2022-06-08 | End: 2022-07-11

## 2022-06-08 NOTE — CONFIDENTIAL NOTE
Pending Prescriptions:                       Disp   Refills    temazepam (RESTORIL) 15 MG capsule        60 cap*0            Sig: Take 1-2 capsules (15-30 mg) by mouth nightly as           needed for sleep      Last Written Prescription Date: 5/3/22  Last Fill Quantity: 60,   # refills: 0  Last Office Visit: 5/18/22  Future Office visit: 8/24/22    Routing refill request to provider.     Mahendra Gibson, RN, BSN.  RN Care Coordinator     [facility]   [phone]

## 2022-06-08 NOTE — TELEPHONE ENCOUNTER
Signed Prescriptions:                        Disp   Refills    temazepam (RESTORIL) 15 MG capsule         60 cap*0        Sig: Take 1-2 capsules (15-30 mg) by mouth nightly as           needed for sleep  Authorizing Provider: SANDRA CRUZ, RN, BSN, OCN  Nurse Care Coordinator  [facility] [place]  P: [phone]     F: [phone]

## 2022-06-22 ENCOUNTER — MYC REFILL (OUTPATIENT)
Dept: FAMILY MEDICINE | Facility: CLINIC | Age: 37
End: 2022-06-22

## 2022-06-22 DIAGNOSIS — F98.8 ATTENTION DEFICIT DISORDER, UNSPECIFIED HYPERACTIVITY PRESENCE: ICD-10-CM

## 2022-06-22 RX ORDER — DEXTROAMPHETAMINE SACCHARATE, AMPHETAMINE ASPARTATE MONOHYDRATE, DEXTROAMPHETAMINE SULFATE AND AMPHETAMINE SULFATE 5; 5; 5; 5 MG/1; MG/1; MG/1; MG/1
20 CAPSULE, EXTENDED RELEASE ORAL DAILY
Qty: 30 CAPSULE | Refills: 0 | Status: SHIPPED | OUTPATIENT
Start: 2022-06-22 | End: 2022-07-21

## 2022-07-11 ENCOUNTER — MYC REFILL (OUTPATIENT)
Dept: ONCOLOGY | Facility: CLINIC | Age: 37
End: 2022-07-11

## 2022-07-11 DIAGNOSIS — C74.01 MALIGNANT NEOPLASM OF CORTEX OF RIGHT ADRENAL GLAND (H): ICD-10-CM

## 2022-07-11 DIAGNOSIS — F51.01 PRIMARY INSOMNIA: ICD-10-CM

## 2022-07-12 RX ORDER — TEMAZEPAM 15 MG/1
15-30 CAPSULE ORAL
Qty: 60 CAPSULE | Refills: 0 | Status: SHIPPED | OUTPATIENT
Start: 2022-07-12 | End: 2022-08-15

## 2022-07-12 NOTE — CONFIDENTIAL NOTE
Pending Prescriptions:                       Disp   Refills    temazepam (RESTORIL) 15 MG capsule        60 cap*0            Sig: Take 1-2 capsules (15-30 mg) by mouth nightly as           needed for sleep      Last Written Prescription Date:6/8/22  Last Fill Quantity: 60,   # refills: 0  Last Office Visit: 5/18/22 Future Office visit:8/24/22       Routing refill request to provider for review/approval.     Mahendra Gibson, RN, BSN.  RN Care Coordinator     [facility]   [phone]

## 2022-07-21 ENCOUNTER — MYC REFILL (OUTPATIENT)
Dept: FAMILY MEDICINE | Facility: CLINIC | Age: 37
End: 2022-07-21

## 2022-07-21 DIAGNOSIS — F98.8 ATTENTION DEFICIT DISORDER, UNSPECIFIED HYPERACTIVITY PRESENCE: ICD-10-CM

## 2022-07-22 RX ORDER — DEXTROAMPHETAMINE SACCHARATE, AMPHETAMINE ASPARTATE MONOHYDRATE, DEXTROAMPHETAMINE SULFATE AND AMPHETAMINE SULFATE 5; 5; 5; 5 MG/1; MG/1; MG/1; MG/1
20 CAPSULE, EXTENDED RELEASE ORAL DAILY
Qty: 30 CAPSULE | Refills: 0 | Status: SHIPPED | OUTPATIENT
Start: 2022-07-22 | End: 2022-11-21

## 2022-07-25 ENCOUNTER — PATIENT OUTREACH (OUTPATIENT)
Dept: ONCOLOGY | Facility: CLINIC | Age: 37
End: 2022-07-25

## 2022-07-29 ENCOUNTER — MYC REFILL (OUTPATIENT)
Dept: FAMILY MEDICINE | Facility: CLINIC | Age: 37
End: 2022-07-29

## 2022-07-29 DIAGNOSIS — F98.8 ATTENTION DEFICIT DISORDER, UNSPECIFIED HYPERACTIVITY PRESENCE: ICD-10-CM

## 2022-08-01 RX ORDER — DEXTROAMPHETAMINE SACCHARATE, AMPHETAMINE ASPARTATE, DEXTROAMPHETAMINE SULFATE AND AMPHETAMINE SULFATE 5; 5; 5; 5 MG/1; MG/1; MG/1; MG/1
20 TABLET ORAL DAILY
Qty: 30 TABLET | Refills: 0 | Status: SHIPPED | OUTPATIENT
Start: 2022-08-01 | End: 2022-10-28

## 2022-08-15 ENCOUNTER — MYC REFILL (OUTPATIENT)
Dept: ONCOLOGY | Facility: CLINIC | Age: 37
End: 2022-08-15

## 2022-08-15 DIAGNOSIS — F51.01 PRIMARY INSOMNIA: ICD-10-CM

## 2022-08-15 DIAGNOSIS — C74.01 MALIGNANT NEOPLASM OF CORTEX OF RIGHT ADRENAL GLAND (H): ICD-10-CM

## 2022-08-17 RX ORDER — TEMAZEPAM 15 MG/1
15-30 CAPSULE ORAL
Qty: 60 CAPSULE | Refills: 0 | Status: SHIPPED | OUTPATIENT
Start: 2022-08-17 | End: 2022-09-12

## 2022-08-24 ENCOUNTER — VIRTUAL VISIT (OUTPATIENT)
Dept: ONCOLOGY | Facility: CLINIC | Age: 37
End: 2022-08-24
Attending: INTERNAL MEDICINE
Payer: COMMERCIAL

## 2022-08-24 DIAGNOSIS — G47.33 OSA (OBSTRUCTIVE SLEEP APNEA): ICD-10-CM

## 2022-08-24 DIAGNOSIS — C74.01 MALIGNANT NEOPLASM OF CORTEX OF RIGHT ADRENAL GLAND (H): Primary | ICD-10-CM

## 2022-08-24 DIAGNOSIS — E27.40 ADRENAL INSUFFICIENCY (H): ICD-10-CM

## 2022-08-24 PROCEDURE — 99213 OFFICE O/P EST LOW 20 MIN: CPT | Mod: 95 | Performed by: INTERNAL MEDICINE

## 2022-08-24 NOTE — NURSING NOTE
Patient verified medications and allergies are correct via eCheck-in. Patient confirms no changes at this time and/or since last reviewed by clinic staff.    Cris Amezquita, Virtual Facilitator

## 2022-08-24 NOTE — PROGRESS NOTES
"  [facility] Oncology Followup  Aug 24, 2022     REFERRING PROVIDER: Warren Mansfield MD from [facility].      REASON FOR CURRENT VISIT: Follow-up for adrenocortical carcinoma,    ONCOLOGIC HISTORY:  1. 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   - CT abdomen and pelvis stone protocol without contrast 5 8013 showed \"9.2 x 8 cm heterogeneous right upper quadrant mass with small foci of calcification within it which is likely arising from the adrenal gland though inseparable from liver and upper pole of right kidney. It is incompletely evaluated on this noncontrast study. 3 x 2.6 cm mass right lobe of liver on image #85 also incompletely evaluated. 6 x 4 x 4 mm upper third left ureteral obstructing stone with mild to moderate secondary hydronephrosis. Collection of stones at lower pole left kidney extending over an area of approximately 6 x 7 x 4 mm. Additional nonobstructing 1 mm stone upper pole left kidney. 2 mm nonobstructing stone noted upper pole right kidney with no hydronephrosis on the right. Postop change consistent with gastric bypass. Noncontrast images of spleen, left adrenal gland, gallbladder, and pancreas unremarkable. No aneurysm. No adenopathy.\"  - Seen by Dr. Delvalle at [facility] clinic. Referred to Dr. Alvino Patel and then Dr. Warren Mansfield.  - Endocrinology team directed workup showed high DHEAS level of 740 pre-surgery.   - Right open adrenalectomy and hepatic mobilization performed by Dr. Warren Mansfield on 6/25/2018. Pathology showed adrenocortical carcinoma measuring 11.3 cm, weighing 413 g with extension into or through the adrenal capsule negative lymphovascular invasion, tumor necrosis present, margins involved by tumor, no regional lymph nodes identified, " pathologic stage T2 NX.  pathology review reveals low grade ACC.  - DHEAS normalized postsurgery.   - Adjuvant Mitotane started on 7/24/18. Dose increased to 2000mg TID around 10/23/18 due to persistently low troughs. Held 1/16/19 for two weeks and resumed 1/30 at 1000 mg po BID due to very poor tolerance of higher doses. Highest mitotane level was 10.2 on 2/11/19. Mitotane troughs did not rise above 10 despite increase in dose to 1500 BID and then 1500+2000. Started 2000mg BID on 10/28/20. Increased to [phone] since around Christmas 2020.   - Saw radiation oncology at [facility], radiation oncology at [facility], as well as endocrine oncology (Dr. Huertas) at [facility].   - S/p adjuvant RT by Dr. Monsivais - 5040 cGy over 30 fr (8/24/18-10/6/18).  - 2/11/19: OSH CT C/A/P and MRI brain with contrast - no evidence of disease recurrence.   - 06/08/20, 09/14/20, 12/7/20, 3/16/2021: CT C/A/P with contrast - AFUA.    INTERVAL HISTORY:  Ms. Rodríguez is a 36 year old woman with history notable for right-sided functioning adrenocortical carcinoma (diagnosed in May 2018), who was  on adjuvant mitotane until May 2021.     She is being followed over a video visit.    She has been off mitotane now for over a year.  She is doing well overall.  She does not have any new complaints at this visit.       She is being seen for a scheduled follow-up visit.    REVIEW OF SYSTEMS: 14 point ROS negative other than the symptoms noted above in the HPI.    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 Mansfield.     Adrenal mass (H)      Anemia     thought due to heavy menses.     Calculus of ureter 5/9/2018     Carcinoma, adrenal cortical (H) 7/18/2018     Chocolate cyst of ovary 2011     Clotting disorder (H)      Depression      GERD (gastroesophageal reflux disease)      History of miscarriage      Hypertension  "due to endocrine disorder 2018     Kidney stones     passed on their own     Malignant neoplasm of cortex of right adrenal gland (H) 2018    EOTD; 18.  Check in May. SCP started.  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     SHREYA on CPAP 2015    Stopped using CPAP the  after weight loss as even at lower pressure/adjustment couldn't tolerate the cpap. Resting well, cautioned to watch for any signs of fatigue and consider \"titration study\" in the future to see if cpap is still required at her new weight.     Pneumonia     hx of recurrent pneumonia issues/respiratory infections.     Polycystic ovary syndrome     able to get pregnant, not on meds.     Pulmonary embolism (H)  or so    briefly on wafarin.     Sleep apnea     cpap     Vitamin D deficiency    MHTFR mutation with h/o mutiple miscarriages.    PAST SURGICAL HISTORY:   Past Surgical History:   Procedure Laterality Date     ADRENALECTOMY Right 2018    Procedure: ADRENALECTOMY;  Right Adrenalectomy,  Embolize Liver , Anesthesia Block;  Surgeon: Warren Mansfield MD;  Location: [facility] OR     ADRENALECTOMY       ADRENALECTOMY Right       SECTION      twice      SECTION      2      SECTION      x2     EMBOLECTOMY ABDOMEN N/A 2018    Procedure: EMBOLECTOMY ABDOMEN;;  Surgeon: Ector Mcintyre MD;  Location: [facility] OR     EXTRACORPOREAL SHOCK WAVE LITHOTRIPSY (ESWL)       GASTRIC BYPASS  2016     TN CYSTO/URETERO W/LITHOTRIPSY &INDWELL STENT INSRT Left " 5/11/2018    Procedure: CYSTOSCOPY, LEFT URETEROSCOPY LASER LITHOTRIPSY STENT INSERTION;  Surgeon: Skyler Delvalle MD;  Location: [facility];  Service: Urology     [place] LAP GASTRIC BYPASS/DERICK-EN-Y N/A 6/8/2016    Procedure: GASTRIC BYPASS LAPAROSCOPIC DERICK-EN-Y;  Surgeon: Randy Sutherland MD;  Location: [facility];  Service: General     TUBAL LIGATION       WISDOM TOOTH EXTRACTION Bilateral       SOCIAL HISTORY:   Social History     Tobacco Use     Smoking status: Former Smoker     Smokeless tobacco: Never Used     Tobacco comment: vapes marijuana   Vaping Use     Vaping Use: Never used   Substance Use Topics     Alcohol use: Yes     Comment: occ.     Drug use: No     FAMILY HISTORY:   Family History   Problem Relation Age of Onset     Depression Paternal Grandmother      Diabetes Mother      Diabetes Father      Hypertension Father      Chronic Obstructive Pulmonary Disease Father      Cancer Maternal Grandmother         gastric     Urolithiasis Maternal Grandmother      Heart Disease Maternal Grandfather      Cancer Maternal Grandfather         lung     Heart Disease Paternal Grandfather      Breast Cancer No family hx of      Colon Cancer No family hx of      Prostate Cancer No family hx of      Cerebrovascular Disease No family hx of      Clotting Disorder No family hx of      Gout No family hx of      ALLERGIES:   Allergies   Allergen Reactions     Bee Venom Swelling     Ibuprofen Hives and Swelling     CURRENT MEDICATIONS:   Current Outpatient Medications   Medication Instructions     acetaminophen (TYLENOL) 650 mg, Oral, EVERY 4 HOURS PRN     buPROPion (WELLBUTRIN SR) 150 mg, Oral     calcitRIOL (ROCALTROL) 0.5 MCG capsule No dose, route, or frequency recorded.     calcium carbonate 500 mg (elemental) (OSCAL) 500 mg, Oral, 2 TIMES DAILY     clonazePAM (KLONOPIN) 0.5 mg, Oral, 2 TIMES DAILY PRN     diazepam (VALIUM) 5 mg, Oral, ONCE PRN     fludrocortisone (FLORINEF) 0.2 mg, Oral, DAILY      FLUoxetine (PROZAC) 60 mg, Oral, DAILY     gabapentin (NEURONTIN) 900 mg, Oral, 3 TIMES DAILY     hydrocortisone (CORTEF) 10 MG tablet Take 3 tablets (30 MG) every morning and take 2 tablets (20 MG) by mouth every day at 2 PM. Additional as directed.     levothyroxine (SYNTHROID/LEVOTHROID) 125 mcg, Oral, DAILY     ondansetron (ZOFRAN) 8 mg, Oral, EVERY 8 HOURS PRN     prochlorperazine (COMPAZINE) 5 mg, Oral, EVERY 8 HOURS PRN     temazepam (RESTORIL) 15 mg, Oral, AT BEDTIME PRN     UNABLE TO FIND Other, medical cannabis (Patient's own supply. Not a prescription)       PHYSICAL EXAMINATION:  Vital signs: There were no vitals taken for this visit.   Gen: NAD, on video no distress  No other exam  Recent Labs   Lab Test 05/12/22  1017 03/24/22  1102 03/24/22  0728 02/08/22  0903 01/18/22  1607    138 140 139 138   POTASSIUM 4.4 3.6 3.9 3.8 4.4   CHLORIDE 107 102 107 104 107   CO2 27 29 30 27 27   ANIONGAP 5 7 3 8 4   BUN 6* 12 12 10 9   CR 0.72 0.80 0.78 0.71 0.74   GLC 86 70 88 88 87   SHASHA 9.1 8.8 9.1 8.6 8.5     No results for input(s): MAG, PHOS in the last 09941 hours.  Recent Labs   Lab Test 05/12/22  1017 03/24/22  0822 02/08/22  0903 01/18/22  1607 12/31/21  1348 10/25/21  0919   WBC 4.0 4.3 6.0 6.3 5.2 4.2   HGB 12.7 13.8 13.6 13.8 13.8 14.1    215 185 252 166 201   MCV 89 92 92 91 95 94   NEUTROPHIL 65 54 72  --  79 68     Recent Labs   Lab Test 05/12/22  1017 03/24/22  1102 02/08/22  0903   BILITOTAL 0.2 0.3 0.2   ALKPHOS 138 134 118   ALT 29 33 32   AST 21 22 25   ALBUMIN 3.5 3.6 3.4     TSH   Date Value Ref Range Status   05/12/2022 0.16 (L) 0.40 - 4.00 mU/L Final   03/24/2022 0.08 (L) 0.40 - 4.00 mU/L Final   02/08/2022 0.29 (L) 0.40 - 4.00 mU/L Final   07/05/2021 0.22 (L) 0.40 - 4.00 mU/L Final   06/04/2021 0.44 0.40 - 4.00 mU/L Final   05/10/2021 0.18 (L) 0.40 - 4.00 mU/L Final     No results for input(s): CEA in the last 33144 hours.  Results for orders placed or performed in visit on  05/16/22   CT Chest/Abdomen/Pelvis w Contrast    Narrative    EXAM: CT CHEST/ABDOMEN/PELVIS W CONTRAST  LOCATION: [facility]  DATE/TIME: 5/16/2022 2:45 PM    INDICATION: Adrenocortical carcinoma surveillance  COMPARISON: Multiple priors with the most recent dated 02/08/2022  TECHNIQUE: CT scan of the chest, abdomen, and pelvis was performed following injection of IV contrast. Multiplanar reformats were obtained. Dose reduction techniques were used.   CONTRAST: Isovue 370 95cc    FINDINGS:   LUNGS AND PLEURA: Patent central airways. No new noncalcified pulmonary nodule. Unchanged minimal thickening along the left major fissure, which likely reflects a benign intrapulmonary lymph node. Unchanged scarring in the right lower lobe. No new   pleural effusion. No pneumothorax.    MEDIASTINUM/AXILLAE: No intrathoracic lymphadenopathy. No incidentally noted central pulmonary embolus. Normal caliber thoracic aorta. Normal heart size. No pancreatic or effusion. Normal esophagus.    CORONARY ARTERY CALCIFICATION: None.    HEPATOBILIARY: Benign inferior right hepatic hemangioma. No new focal hepatic mass. No biliary dilatation. No calcified gallstones.    PANCREAS: Homogeneous enhancement. No focal pancreatic mass. No peripancreatic inflammation. No pancreatic ductal dilatation.    SPLEEN: Normal in size.    ADRENAL GLANDS: Prior right adrenalectomy. No new soft tissue nodule in the surgical bed. Unremarkable left adrenal gland.    KIDNEYS/BLADDER: Stable focal scarring and hypoenhancement in the superior right renal pole. No new suspicious renal mass. No collecting system dilatation. No urinary bladder wall thickening.    BOWEL: Prior gastric bypass. Conspicuous polypoid tissue in the proximal duodenum measuring 1.2 x 1.1 cm, which previously measured 1.2 x 1.0 cm (series 3, image 308). No dilated loops of small bowel to suggest an obstruction. Normal caliber appendix.    Unremarkable appearance of the colon.    LYMPH NODES: No new abdominopelvic lymphadenopathy.    VASCULATURE: Normal caliber abdominal aorta. Patent portal, splenic, and superior mesenteric veins. Patent bilateral renal veins and IVC.    PELVIC ORGANS: Left adnexal cyst measuring 2.8 cm, previously 3.1 cm. Indwelling IUD.    MUSCULOSKELETAL: Tiny fat filled ventral abdominal wall hernia. No aggressive osseous lesion.      Impression    IMPRESSION:  1.  No definite findings to suggest metastatic disease in the chest, abdomen, or pelvis.  2.  Relatively stable polypoid tissue in the proximal duodenum. Continued attention on the follow-up is recommended.  3.  Decreased size of the left adnexal cyst, likely benign in a premenopausal female.     *Note: Due to a large number of results and/or encounters for the requested time period, some results have not been displayed. A complete set of results can be found in Results Review.            ASSESSMENT AND PLAN: A 36 year old  female with right-sided functioning high-risk adrenocortical carcinoma.     Adrenocortical carcinoma:  - Started on adjuvant mitotane in July 2018 based on her presentation and tumor characteristics including invasion of the adrenal capsule, high mitotic rate, possibly positive margins, and high Ki67, which could result in a rate of recurrence as high as 40%.    The mitotane was stopped by Dr. Liang and endocrinologic oncologist from [facility] in May 2021.       She did not have labs or imaging prior to this visit. She has been working with [facility] team and next imaging has been recommended in late October / early November.     Her hydrocortisone dose is being managed by Dr. Jaquez. She does not have questions or concerns at this visit. I will see her in 2 - 3 months with labs and restaging scans.         Video-Visit Details    Type of service:  Video Visit  Originating Location (pt. Location): Home  Distant Location (provider  location):  [facility] [place]   Platform used for Video Visit: [place]    15 minutes spent on the date of the encounter doing chart review, history and exam, documentation and further activities as noted above      Tru Hernadez    Hematologist and Medical Oncologist  [facility]

## 2022-08-24 NOTE — LETTER
"    8/24/2022         RE: Suzy Rodríguez  [address]  [facility] [address]        Dear Colleague,    Thank you for referring your patient, Suzy Rodríguez, to the [facility]. Please see a copy of my visit note below.    Suzy is a 36 year old who is being evaluated via a billable video visit.      Patient stated she is in the state of [place] for the visit today.    How would you like to obtain your AVS? MyChart  If the video visit is dropped, the invitation should be resent by: Text to cell phone: [phone]  Will anyone else be joining your video visit? No        Video-Visit Details     Originating Location (pt. Location): Home    Distant Location (provider location):  [facility]     Platform used for Video Visit: Milton Amezquita, Virtual Visit Facilitator        [facility] Followup  Aug 24, 2022     REFERRING PROVIDER: Warren Mansfield MD from [facility]rolog[facility] Oncology clinic.      REASON FOR CURRENT VISIT: Follow-up for adrenocortical carcinoma,    ONCOLOGIC HISTORY:  1. 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   - CT abdomen and pelvis stone protocol without contrast 5 8456 showed \"9.2 x 8 cm heterogeneous right upper quadrant mass with small foci of calcification within it which is likely arising from the adrenal gland though inseparable from liver and upper pole of right kidney. It is incompletely evaluated on this noncontrast study. 3 x 2.6 cm mass right lobe of liver on image #85 also incompletely evaluated. 6 x 4 x 4 mm upper third left ureteral obstructing stone with mild to moderate secondary hydronephrosis. Collection of stones at lower pole left kidney extending over an area of approximately 6 x 7 x 4 mm. Additional nonobstructing 1 mm " "stone upper pole left kidney. 2 mm nonobstructing stone noted upper pole right kidney with no hydronephrosis on the right. Postop change consistent with gastric bypass. Noncontrast images of spleen, left adrenal gland, gallbladder, and pancreas unremarkable. No aneurysm. No adenopathy.\"  - Seen by Dr. Delvalle at [facility] Urology clinic. Referred to Dr. Alvino Patel and then Dr. Warren Mansfield.  - Endocrinology team directed workup showed high DHEAS level of 740 pre-surgery.   - Right open adrenalectomy and hepatic mobilization performed by Dr. Warren Mansfield on 6/25/2018. Pathology showed adrenocortical carcinoma measuring 11.3 cm, weighing 413 g with extension into or through the adrenal capsule negative lymphovascular invasion, tumor necrosis present, margins involved by tumor, no regional lymph nodes identified, pathologic stage T2 NX.  pathology review reveals low grade ACC.  - DHEAS normalized postsurgery.   - Adjuvant Mitotane started on 7/24/18. Dose increased to 2000mg TID around 10/23/18 due to persistently low troughs. Held 1/16/19 for two weeks and resumed 1/30 at 1000 mg po BID due to very poor tolerance of higher doses. Highest mitotane level was 10.2 on 2/11/19. Mitotane troughs did not rise above 10 despite increase in dose to 1500 BID and then 1500+2000. Started 2000mg BID on 10/28/20. Increased to [id number] since around Christmas 2020.   - Saw radiation oncology at [facility], radiation oncology at [facility], as well as endocrine oncology (Dr. Huertas) at [facility].   - S/p adjuvant RT by Dr. Monsivais - 5040 cGy over 30 fr (8/24/18-10/6/18).  - 2/11/19: OSH CT C/A/P and MRI brain with contrast - no evidence of disease recurrence.   - 06/08/20, 09/14/20, 12/7/20, 3/16/2021: CT C/A/P with contrast - AFUA.    INTERVAL HISTORY:  Ms. Rodríguez is a 36 year old woman with history notable for right-sided functioning adrenocortical carcinoma (diagnosed in " "May 2018), who was  on adjuvant mitotane until May 2021.     She is being followed over a video visit.    She has been off mitotane now for over a year.  She is doing well overall.  She does not have any new complaints at this visit.       She is being seen for a scheduled follow-up visit.    REVIEW OF SYSTEMS: 14 point ROS negative other than the symptoms noted above in the HPI.    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 Mansfield.     Adrenal mass (H)      Anemia     thought due to heavy menses.     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  Check in May. SCP started.  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     SHREYA on CPAP 12/17/2015    Stopped using CPAP the Fall of 2016 after weight loss as even at lower pressure/adjustment couldn't tolerate the cpap. Resting well, cautioned to watch for any signs of fatigue and consider \"titration study\" in the future to see if cpap is still required at her new weight.     Pneumonia     hx of recurrent pneumonia issues/respiratory infections.     Polycystic ovary syndrome     able to get pregnant, " not on meds.     Pulmonary embolism (H)  or so    briefly on wafarin.     Sleep apnea     cpap     Vitamin D deficiency    MHTFR mutation with h/o mutiple miscarriages.    PAST SURGICAL HISTORY:   Past Surgical History:   Procedure Laterality Date     ADRENALECTOMY Right 2018    Procedure: ADRENALECTOMY;  Right Adrenalectomy,  Embolize Liver , Anesthesia Block;  Surgeon: Warren Mansfield MD;  Location: [facility] OR     ADRENALECTOMY       ADRENALECTOMY Right       SECTION      twice      SECTION      2      SECTION      x2     EMBOLECTOMY ABDOMEN N/A 2018    Procedure: EMBOLECTOMY ABDOMEN;;  Surgeon: Ector Mcintyre MD;  Location: [facility] OR     EXTRACORPOREAL SHOCK WAVE LITHOTRIPSY (ESWL)       GASTRIC BYPASS       AZ CYSTO/URETERO W/LITHOTRIPSY &INDWELL STENT INSRT Left 2018    Procedure: CYSTOSCOPY, LEFT URETEROSCOPY LASER LITHOTRIPSY STENT INSERTION;  Surgeon: Skyler Delvalle MD;  Location: [facility];  Service: Urology     AZ LAP GASTRIC BYPASS/DERICK-EN-Y N/A 2016    Procedure: GASTRIC BYPASS LAPAROSCOPIC DERICK-EN-Y;  Surgeon: Randy Sutherland MD;  Location: [facility] OR;  Service: General     TUBAL LIGATION       WISDOM TOOTH EXTRACTION Bilateral       SOCIAL HISTORY:   Social History     Tobacco Use     Smoking status: Former Smoker     Smokeless tobacco: Never Used     Tobacco comment: vapes marijuana   Vaping Use     Vaping Use: Never used   Substance Use Topics     Alcohol use: Yes     Comment: occ.     Drug use: No     FAMILY HISTORY:   Family History   Problem Relation Age of Onset     Depression Paternal Grandmother      Diabetes Mother      Diabetes Father      Hypertension Father      Chronic Obstructive Pulmonary Disease Father      Cancer Maternal Grandmother         gastric     Urolithiasis Maternal Grandmother      Heart Disease Maternal Grandfather      Cancer Maternal Grandfather         lung     Heart Disease Paternal  Grandfather      Breast Cancer No family hx of      Colon Cancer No family hx of      Prostate Cancer No family hx of      Cerebrovascular Disease No family hx of      Clotting Disorder No family hx of      Gout No family hx of      ALLERGIES:   Allergies   Allergen Reactions     Bee Venom Swelling     Ibuprofen Hives and Swelling     CURRENT MEDICATIONS:   Current Outpatient Medications   Medication Instructions     acetaminophen (TYLENOL) 650 mg, Oral, EVERY 4 HOURS PRN     buPROPion (WELLBUTRIN SR) 150 mg, Oral     calcitRIOL (ROCALTROL) 0.5 MCG capsule No dose, route, or frequency recorded.     calcium carbonate 500 mg (elemental) (OSCAL) 500 mg, Oral, 2 TIMES DAILY     clonazePAM (KLONOPIN) 0.5 mg, Oral, 2 TIMES DAILY PRN     diazepam (VALIUM) 5 mg, Oral, ONCE PRN     fludrocortisone (FLORINEF) 0.2 mg, Oral, DAILY     FLUoxetine (PROZAC) 60 mg, Oral, DAILY     gabapentin (NEURONTIN) 900 mg, Oral, 3 TIMES DAILY     hydrocortisone (CORTEF) 10 MG tablet Take 3 tablets (30 MG) every morning and take 2 tablets (20 MG) by mouth every day at 2 PM. Additional as directed.     levothyroxine (SYNTHROID/LEVOTHROID) 125 mcg, Oral, DAILY     ondansetron (ZOFRAN) 8 mg, Oral, EVERY 8 HOURS PRN     prochlorperazine (COMPAZINE) 5 mg, Oral, EVERY 8 HOURS PRN     temazepam (RESTORIL) 15 mg, Oral, AT BEDTIME PRN     UNABLE TO FIND Other, medical cannabis (Patient's own supply. Not a prescription)       PHYSICAL EXAMINATION:  Vital signs: There were no vitals taken for this visit.   Gen: NAD, on video no distress  No other exam  Recent Labs   Lab Test 05/12/22  1017 03/24/22  1102 03/24/22  0728 02/08/22  0903 01/18/22  1607    138 140 139 138   POTASSIUM 4.4 3.6 3.9 3.8 4.4   CHLORIDE 107 102 107 104 107   CO2 27 29 30 27 27   ANIONGAP 5 7 3 8 4   BUN 6* 12 12 10 9   CR 0.72 0.80 0.78 0.71 0.74   GLC 86 70 88 88 87   SHASHA 9.1 8.8 9.1 8.6 8.5     No results for input(s): MAG, PHOS in the last 38695 hours.  Recent Labs   Lab  Test 05/12/22  1017 03/24/22  0822 02/08/22  0903 01/18/22  1607 12/31/21  1348 10/25/21  0919   WBC 4.0 4.3 6.0 6.3 5.2 4.2   HGB 12.7 13.8 13.6 13.8 13.8 14.1    215 185 252 166 201   MCV 89 92 92 91 95 94   NEUTROPHIL 65 54 72  --  79 68     Recent Labs   Lab Test 05/12/22  1017 03/24/22  1102 02/08/22  0903   BILITOTAL 0.2 0.3 0.2   ALKPHOS 138 134 118   ALT 29 33 32   AST 21 22 25   ALBUMIN 3.5 3.6 3.4     TSH   Date Value Ref Range Status   05/12/2022 0.16 (L) 0.40 - 4.00 mU/L Final   03/24/2022 0.08 (L) 0.40 - 4.00 mU/L Final   02/08/2022 0.29 (L) 0.40 - 4.00 mU/L Final   07/05/2021 0.22 (L) 0.40 - 4.00 mU/L Final   06/04/2021 0.44 0.40 - 4.00 mU/L Final   05/10/2021 0.18 (L) 0.40 - 4.00 mU/L Final     No results for input(s): CEA in the last 28080 hours.  Results for orders placed or performed in visit on 05/16/22   CT Chest/Abdomen/Pelvis w Contrast    Narrative    EXAM: CT CHEST/ABDOMEN/PELVIS W CONTRAST  LOCATION: [facility]  DATE/TIME: 5/16/2022 2:45 PM    INDICATION: Adrenocortical carcinoma surveillance  COMPARISON: Multiple priors with the most recent dated 02/08/2022  TECHNIQUE: CT scan of the chest, abdomen, and pelvis was performed following injection of IV contrast. Multiplanar reformats were obtained. Dose reduction techniques were used.   CONTRAST: Isovue 370 95cc    FINDINGS:   LUNGS AND PLEURA: Patent central airways. No new noncalcified pulmonary nodule. Unchanged minimal thickening along the left major fissure, which likely reflects a benign intrapulmonary lymph node. Unchanged scarring in the right lower lobe. No new   pleural effusion. No pneumothorax.    MEDIASTINUM/AXILLAE: No intrathoracic lymphadenopathy. No incidentally noted central pulmonary embolus. Normal caliber thoracic aorta. Normal heart size. No pancreatic or effusion. Normal esophagus.    CORONARY ARTERY CALCIFICATION: None.    HEPATOBILIARY: Benign inferior right hepatic  hemangioma. No new focal hepatic mass. No biliary dilatation. No calcified gallstones.    PANCREAS: Homogeneous enhancement. No focal pancreatic mass. No peripancreatic inflammation. No pancreatic ductal dilatation.    SPLEEN: Normal in size.    ADRENAL GLANDS: Prior right adrenalectomy. No new soft tissue nodule in the surgical bed. Unremarkable left adrenal gland.    KIDNEYS/BLADDER: Stable focal scarring and hypoenhancement in the superior right renal pole. No new suspicious renal mass. No collecting system dilatation. No urinary bladder wall thickening.    BOWEL: Prior gastric bypass. Conspicuous polypoid tissue in the proximal duodenum measuring 1.2 x 1.1 cm, which previously measured 1.2 x 1.0 cm (series 3, image 308). No dilated loops of small bowel to suggest an obstruction. Normal caliber appendix.   Unremarkable appearance of the colon.    LYMPH NODES: No new abdominopelvic lymphadenopathy.    VASCULATURE: Normal caliber abdominal aorta. Patent portal, splenic, and superior mesenteric veins. Patent bilateral renal veins and IVC.    PELVIC ORGANS: Left adnexal cyst measuring 2.8 cm, previously 3.1 cm. Indwelling IUD.    MUSCULOSKELETAL: Tiny fat filled ventral abdominal wall hernia. No aggressive osseous lesion.      Impression    IMPRESSION:  1.  No definite findings to suggest metastatic disease in the chest, abdomen, or pelvis.  2.  Relatively stable polypoid tissue in the proximal duodenum. Continued attention on the follow-up is recommended.  3.  Decreased size of the left adnexal cyst, likely benign in a premenopausal female.     *Note: Due to a large number of results and/or encounters for the requested time period, some results have not been displayed. A complete set of results can be found in Results Review.            ASSESSMENT AND PLAN: A 36 year old  female with right-sided functioning high-risk adrenocortical carcinoma.     Adrenocortical carcinoma:  - Started on adjuvant mitotane in July 2018  based on her presentation and tumor characteristics including invasion of the adrenal capsule, high mitotic rate, possibly positive margins, and high Ki67, which could result in a rate of recurrence as high as 40%.    The mitotane was stopped by Dr. Liang and endocrinologic oncologist from [facility] in May 2021.       She did not have labs or imaging prior to this visit. She has been working with [facility] team and next imaging has been recommended in late October / early November.     Her hydrocortisone dose is being managed by Dr. Jaquez. She does not have questions or concerns at this visit. I will see her in 2 - 3 months with labs and restaging scans.             Again, thank you for allowing me to participate in the care of your patient.      Sincerely,    Tru Hernadez MD

## 2022-08-29 ENCOUNTER — TELEPHONE (OUTPATIENT)
Dept: SLEEP MEDICINE | Facility: CLINIC | Age: 37
End: 2022-08-29

## 2022-08-29 NOTE — TELEPHONE ENCOUNTER
Reason for Call:  Other other    Detailed comments: Ariel from [employer] Called today and needs a copy of prescripiton for patients cpap machine faxed to:    [phone]    If questions, please contact him back at [phone]    Phone Number Patient can be reached at: Other phone number:  [phone]  *    Best Time: any    Can we leave a detailed message on this number? YES    Call taken on 8/29/2022 at 1:18 PM by Mila Ware

## 2022-08-31 ENCOUNTER — PATIENT OUTREACH (OUTPATIENT)
Dept: CARE COORDINATION | Facility: CLINIC | Age: 37
End: 2022-08-31

## 2022-08-31 NOTE — PROGRESS NOTES
Oncology Distress Screening Follow-up  Clinical Social Work  [facility]    Identified Concern and Score From Distress Screenin. How concerned are you about work and home life issues that may be affected by your cancer?  10 Abnormal            Date of Distress Screenin22    Data: Suzy is a 36 year old who is being evaluated for right-sided functioning high-risk adrenocortical carcinoma. At time of last visit, Patient scored positive on distress screen.  called Patient today with intention of introducing them to psychosocial services and support, and following up on elevated distress.        Intervention/Education Provided: Phone call to patient about distress screening. No answer - message left. Provided contact information in order to reach writer.       Follow-up Required: Will remain available for support and await patient's return call.          Ena LANCASTER, YAMILKA  - Oncology  Phone : [phone]  Pager: [phone]

## 2022-09-09 NOTE — LETTER
3/17/2021         RE: Suzy Rodríguez  [address]  [facility] [address]        Dear Colleague,    Thank you for referring your patient, Suzy Rodríguez, to the [facility]. Please see a copy of my visit note below.    MEDICAL ONCOLOGY CLINIC NOTE    REFERRING PROVIDER: Warren Mansfield MD from [facility] Urologic Oncology clinic.      REASON FOR CURRENT VISIT: Follow-up for adrenocortical carcinoma, currently on adjuvant mitotane.    HISTORY OF PRESENT ILLNESS: Ms. 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     Suzy has been on mitotane [phone] since around Christmas 2020. Weight is now stable, BP normal. Nausea is stable and using compazine 10mg QAM+QPM. She continues to have brain fog, tremors, poor sleep at night. She has intermittent headaches when watching TV and cannot focus for a while. She is wondering if it is related to her concussion that occurred when sleep-walking a few days ago (managed by PCP).     The decreased in trazodone dose is not helping her insomnia. She met with provider at [facility], who discussed probably reducing dose of mitotane, but level has been 14 in Jan. She did not lower dose, waiting for level today. She is wondering if she could come off of mitotane in June when she will be completing 3 years of therapy, primarily due to neurological side effects. Denies any fever, diarrhea, dizziness, wt loss. No clinical signs/symptoms of recurrence although she has non-specific MSK RU chest pain and SANCHEZ.    Anxiety significant but she has not had any further episodes of sudden-onset palpitations, pre-syncope (without CP/SOA/neuro symptoms) which resolved within a minute or so with deep breathing and sitting down. Also has significant insomnia due to hydrcort? Legs with long-standing dull aching pain  Patient stated that she is going to see a neurosurgeon Dr. Jair Watts and that an MRI is needed to determine if she needs surgery or not. "which is helped by gabapentin 900mg TID.    Continues hydrocortisone 30mg QAM, 20mg early PM; fluodrocortisone 0.1mg/day and levothyroxine to 125 mcg/day and then 2 tablets on Sunday. She also follows with Dr. Veena Jaquez in our endocrinology division.     ONCOLOGIC HISTORY:  1. 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   - CT abdomen and pelvis stone protocol without contrast 5 8018 showed \"9.2 x 8 cm heterogeneous right upper quadrant mass with small foci of calcification within it which is likely arising from the adrenal gland though inseparable from liver and upper pole of right kidney. It is incompletely evaluated on this noncontrast study. 3 x 2.6 cm mass right lobe of liver on image #85 also incompletely evaluated. 6 x 4 x 4 mm upper third left ureteral obstructing stone with mild to moderate secondary hydronephrosis. Collection of stones at lower pole left kidney extending over an area of approximately 6 x 7 x 4 mm. Additional nonobstructing 1 mm stone upper pole left kidney. 2 mm nonobstructing stone noted upper pole right kidney with no hydronephrosis on the right. Postop change consistent with gastric bypass. Noncontrast images of spleen, left adrenal gland, gallbladder, and pancreas unremarkable. No aneurysm. No adenopathy.\"  - Seen by Dr. Delvalle at [facility] Urology clinic. Referred to Dr. Alvino Patel and then Dr. Warren Mansfield.  - Endocrinology team directed workup showed high DHEAS level of 740 pre-surgery.   - Right open adrenalectomy and hepatic mobilization performed by Dr. Warren Mansfield on 6/25/2018. Pathology showed adrenocortical carcinoma measuring 11.3 cm, weighing 413 g with extension into or through the adrenal capsule negative lymphovascular invasion, tumor necrosis present, margins involved by tumor, no " regional lymph nodes identified, pathologic stage T2 NX.  pathology review reveals low grade ACC.  - DHEAS normalized postsurgery.   - Adjuvant Mitotane started on 7/24/18. Dose increased to 2000mg TID around 10/23/18 due to persistently low troughs. Held 1/16/19 for two weeks and resumed 1/30 at 1000 mg po BID due to very poor tolerance of higher doses. Highest mitotane level was 10.2 on 2/11/19. Mitotane troughs did not rise above 10 despite increase in dose to 1500 BID and then 1500+2000. Started 2000mg BID on 10/28/20. Increased to [phone] since around Christmas 2020.   - Saw radiation oncology at [facility], radiation oncology at [facility], as well as endocrine oncology (Dr. Huertas) at [facility].   - S/p adjuvant RT by Dr. Monsivais - 5040 cGy over 30 fr (8/24/18-10/6/18).  - 2/11/19: OSH CT C/A/P and MRI brain with contrast - no evidence of disease recurrence.   - 06/08/20, 09/14/20, 12/7/20, 3/16/2021: CT C/A/P with contrast - AFUA.    REVIEW OF SYSTEMS: 14 point ROS negative other than the symptoms noted above in the HPI.    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 Mansfield.     Adrenal mass (H)      Chocolate cyst of ovary 2011     History of miscarriage      Malignant neoplasm of cortex of right adrenal gland (H) 6/25/2018    EOTD; 4/29/19.  Check in May. SCP started.  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) June 25, 2018   Cushing's syndrome, secondary to #1 (300 mcg/24 hr); Rx hydrocortisone 20 + 10 post op   Post operative defect right hemidiaphragm with ?worsening right effussion, not present preop   Currently on mitotane, 500 mg, BID x 1 week + hydrocortisone     MTHFR mutation (H)    MHTFR mutation with h/o mutiple miscarriages.    PAST SURGICAL HISTORY:   Past Surgical History:   Procedure Laterality Date      ADRENALECTOMY Right 2018    Procedure: ADRENALECTOMY;  Right Adrenalectomy,  Embolize Liver , Anesthesia Block;  Surgeon: Warren Mansfield MD;  Location: [facility] OR     ADRENALECTOMY        SECTION      twice      SECTION      2     EMBOLECTOMY ABDOMEN N/A 2018    Procedure: EMBOLECTOMY ABDOMEN;;  Surgeon: Ector Mcintyre MD;  Location: [facility] OR     EXTRACORPOREAL SHOCK WAVE LITHOTRIPSY (ESWL)       GASTRIC BYPASS        SOCIAL HISTORY:   Social History     Tobacco Use     Smoking status: Never Smoker     Smokeless tobacco: Never Used   Substance Use Topics     Alcohol use: Yes     Comment: occ.     Drug use: No     FAMILY HISTORY:   Family History   Problem Relation Age of Onset     Depression Paternal Grandmother      ALLERGIES:   Allergies   Allergen Reactions     Bee Venom Swelling     Ibuprofen Hives and Swelling     CURRENT MEDICATIONS:   Current Outpatient Medications:      acetaminophen (TYLENOL) 325 MG tablet, Take 2 tablets (650 mg) by mouth every 4 hours as needed for other (multimodal surgical pain management along with NSAIDS and opioid medication as indicated based on pain control and physical function.), Disp: 150 tablet, Rfl: 0     blood glucose (NO BRAND SPECIFIED) test strip, Use to test blood sugar as directed., Disp: 50 strip, Rfl: 0     blood glucose monitoring (NO BRAND SPECIFIED) meter device kit, Use to test blood sugar a the time you experience symptoms of a low BG., Disp: 1 kit, Rfl: 0     buPROPion (WELLBUTRIN) 100 MG tablet, TAKE 1 TABLET (100 MG TOTAL) BY MOUTH 2 (TWO) TIMES A DAY., Disp: , Rfl: 3     calcium carbonate 500 mg, elemental, (OSCAL) 500 MG tablet, Take 1 tablet (500 mg) by mouth 2 times daily, Disp: 180 tablet, Rfl: 3     cholecalciferol (VITAMIN D-1000 MAX ST) 1000 units TABS, Take 2,000 Units by mouth every morning , Disp: , Rfl:      clonazePAM (KLONOPIN) 0.5 MG tablet, Take 1 tablet (0.5 mg) by mouth 2 times daily as needed for  anxiety, Disp: 60 tablet, Rfl: 0     diazepam (VALIUM) 5 MG tablet, Take 1 tablet (5 mg) by mouth once as needed for anxiety (MRI claustrophobia management), Disp: 2 tablet, Rfl: 0     EPINEPHrine (EPIPEN/ADRENACLICK/OR ANY BX GENERIC EQUIV) 0.3 MG/0.3ML injection 2-pack, As directed for bee stings, Disp: , Rfl:      ferrous fumarate 65 mg, [place]. FE,-Vitamin C 125 mg (VITRON-C)  MG TABS tablet, Take 1 tablet by mouth 3 times daily, Disp: 270 tablet, Rfl: 1     fludrocortisone (FLORINEF) 0.1 MG tablet, Take 1 tablet (0.1 mg) by mouth daily, Disp: 90 tablet, Rfl: 1     FLUoxetine (PROZAC) 40 MG capsule, Take 60 mg by mouth daily , Disp: , Rfl:      gabapentin (NEURONTIN) 300 MG capsule, Take 3 capsules (900 mg) by mouth 3 times daily, Disp: 810 capsule, Rfl: 1     hydrocortisone (CORTEF) 10 MG tablet, Take 3 tablets (30 MG) every morning and take 2 tablets (20 MG) by mouth every day at 2 PM. Additional as directed., Disp: 550 tablet, Rfl: 3     levothyroxine (SYNTHROID/LEVOTHROID) 125 MCG tablet, Take 1 tablet (125 mcg) by mouth daily, Disp: 90 tablet, Rfl: 3     mitotane (LYSODREN) 500 MG tablet, Take 4 tablets (2,000 mg) by mouth every morning AND 3 tablets (1,500 mg) daily (with lunch) AND 4 tablets (2,000 mg) every evening., Disp: 330 tablet, Rfl: 0     Multiple Vitamins-Calcium (ONE-A-DAY WOMENS PO), Take 2 tablets by mouth every morning , Disp: , Rfl:      ondansetron (ZOFRAN) 8 MG tablet, Take 1 tablet (8 mg) by mouth every 8 hours as needed for nausea, Disp: 30 tablet, Rfl: 0     prochlorperazine (COMPAZINE) 5 MG tablet, Take 1 tablet (5 mg) by mouth every 8 hours as needed for nausea or vomiting, Disp: 90 tablet, Rfl: 3     traZODone (DESYREL) 50 MG tablet, Take 1 tablet (50 mg) by mouth At Bedtime, Disp: 4 tablet, Rfl: 0     UNABLE TO FIND, medical cannabis (Patient's own supply. Not a prescription), Disp: , Rfl:   No current facility-administered medications for this visit.     PHYSICAL  EXAMINATION:  Vital signs: /77 (BP Location: Right arm, Patient Position: Sitting, Cuff Size: Adult Regular)   Pulse 86   Temp 98  F (36.7  C) (Oral)   Resp 16   Wt 69.5 kg (153 lb 3.2 oz)   SpO2 98%   BMI 26.30 kg/m     ECOG performance status of 2.   GENERAL: Young lady, sitting in chair. Appears tremulous, but in no acute distress.  HEENT: No icterus, no pallor. MMM, PERRL, OP clear.   NECK: Supple, no JVD/LAD.  LUNGS: CTAB, normal WOB on RA.  CARDIOVASCULAR: Regular rate and rhythm, no murmurs.   ABDOMEN: Soft, NT, ND, no masses appreciated, normal BS.   EXTREMITIES: No cyanosis, no clubbing, no edema.   NEUROLOGIC: Alert and fully oriented. Hands and feet are tremulous.   PSYCH: Affect flat, mood slightly anxious/depressed    LABORATORY DATA:   Recent Labs   Lab Test 03/17/21  1135 01/20/21  1146 12/09/20  1530   WBC 4.7 6.1 7.5   RBC 4.61 4.60 4.40   HGB 14.7 14.8 14.0   HCT 44.8 45.1 42.4   MCV 97 98 96   MCH 31.9 32.2 31.8   MCHC 32.8 32.8 33.0   RDW 13.8 13.6 13.0    177 229   NEUTROPHIL 66.5 66.2 78.3    136 138   POTASSIUM 3.0* 3.7 4.1   CHLORIDE 108 104 106   CO2 25 29 28   ANIONGAP 7 3 4   * 93 83   BUN 7 10 10   CR 0.67 0.68 0.72   SHASHA 8.0* 7.6* 8.0*   PROTTOTAL 6.3* 6.0* 6.0*   ALBUMIN 3.0* 3.0* 3.0*   BILITOTAL 0.2 0.2 0.2   ALKPHOS 79 80 85   AST 19 18 18   ALT 25 18 22     Lab Test 03/17/21  1135 01/20/21  1146 12/09/20  1530 10/28/20  1532 09/16/20  1437   TSH 0.18* 0.32* 0.25* 0.20* 0.63   T4 0.82 1.04 1.03 1.06 1.02     Lab Test 03/17/21  1135 01/20/21  1146   CHOL 166 172    106   LDL 25 23   TRIG 185* 213*        Ref. Range 1/28/2021 16:35   Cortisol Free Duration Urine Latest Units: h 24   Cortisol Free Urine Latest Ref Range: <=45.0 ug/d 102.6 (H)   Cortisol Free Urine Intrepretation Unknown SEE NOTE   Cortisol Free Urine Random Latest Units: ug/L 45.10   Cortisol ug/g creatinine Latest Units: ug/g CRT 79.12   CORTISOL CORTISONE FREE 24 HOUR URINE  Unknown Rpt (A)   Creatinine Urine/Volume Latest Units: mg/dL 57   Creatinine Urine/24hr Latest Ref Range: 700 - 1,600 mg/d 1,297     DHEAS was 740 on 6/5/18, and has been <15 on 7/12/18, 8/20/18, 9/19/18, 10/23/18, 11/27/18, 1/9/19, 5/8/19, 6/18/19, 7/17/19, 11/15/19, 12/12/19, 1/22/20, 3/30/20 and 6/8/20, 7/21/20, 8/5/20, 1/20/21.  Mitotane level (target 14-20): 1.8ng/dl on 8/20/18, 3.5 on 10/25/18, 6.2 on 12/4/18, 8.1 on 1/9/19 and 10.2 on 2/11/19, 14.2 on 6/18/19. 10.8 on 12/12/19.  10 on 1/24/20. 8.9 on 5/14/20. 8.6 on 7/21/20, 8.3 on 8/5/20, 14.0 in 1/2021.  L[facility] 9/24/19 - WBC 4200, ANC 2600, Hb 10.4, Platelets 218K, lytes WNL, creat 0.61, Calcium 8, albumin 3.5, LFTs normal, mitotane level 18.8, cortisol 25.8, ACTH<5, aldosterone 4.9, free T4 1.17, TSH 0.07, DHEAS <15, renin plasma 10.6.     IMAGING DATA:  As above.    ASSESSMENT AND PLAN: A 35-year-old lady with right-sided functioning high-risk adrenocortical carcinoma.     Adrenocortical carcinoma:  - Started on adjuvant mitotane in July 2018 based on her presentation and tumor characteristics including invasion of the adrenal capsule, high mitotic rate, possibly positive margins, and high Ki67, which could result in a rate of recurrence as high as 40%.    - Restaging CT C/A/P 3/16/21 showed no evidence of disease recurrence. She's 2 years 9 months out from surgery and explained that this is an excellent finding as recurrence rates drop quite a bit after year 2.  - She has significant burden of AEs from high dose of mitotane recommended by Dr. Hilton at [facility] - she's now at 5.5 grams/day ([id number] mg divided doses). Offered again to decrease dose/hold Rx to improve tolerance but she wants to see levels from today before making any decisions.   - Advised to monitor closely for AEs.   - Every 6 weekly mitotane level, CBC, comprehensive panel, TSH, FT4, DHEAS, ACTH, periodic cholesterol panel, and 24-hour Urine Free Cortisol due to COVID  pandemic.  - Our plan is to continue adjuvant Mitotane for up to 5 yrs if tolerated because of her high-risk disease. However, she was advised by Dr. Huertas's team that she could come off of mitotane at end of 3 years which is 2021 by [facility]. We will message Dr Huertas to clarify this.     Headache, slurred speech, abnormal MRI brain:  - Likely due to mitotane but MRI brain with contrast had abnormal basal ganglia signal.   - Was previously referred to Neurology for eval of any alternative etiologies. I don't see a consult note but pt mentioned that she had a visit. She'll send us info via [employer]. Monitor for new/worsening symptoms.  - She is wondering if her symptoms could be related to concussion. Advised her to follow up with her PCP and discuss possible re-imaging.   - She wants to go back up on trazodone to 150 mg daily. Advised to hold off for now while she is experiencing possible symptoms related to concussion. May take clonopin PRN if needed 1 hour after taking trazodone 75 mg.     Nausea, chronic:  - Advised on long-term AEs with compazine use. Avoiding benzos due to potential for addiction.    Endocrinopathies:  - Mgmt per endocrine team at the . She remains of hydrocortisone 30 mg in AM, 20 mg in PM and synthroid supplementation.     Ovarian cysts:  - Will follow with Gyn. Advised to inform us well in advance of any surgeries as she'll need mitotane interruption and perioperative adrenal steroid replacement titration.     RTC in 1 month with Dr Hernadez.     Patient seen with Dr Cintron.    Sean Perry  [facility] Onc Fellow  [phone]    ATTENDING ATTESTATION NOTE:  I saw the patient with Dr. Perry and discussed all pertinent clinical data including lab, imaging and path reports. The plan above was made under my supervision. I've edited the note to accurately reflect the A/P after my examination and discussion with the patient.     BILLIN     Benson Cintron M.D.  . Professor of  Medicine  Genitourinary Oncology  Division of Hematology, Oncology & Transplantation  [facility]

## 2022-09-12 ENCOUNTER — MYC REFILL (OUTPATIENT)
Dept: ONCOLOGY | Facility: CLINIC | Age: 37
End: 2022-09-12

## 2022-09-12 DIAGNOSIS — F51.01 PRIMARY INSOMNIA: ICD-10-CM

## 2022-09-12 DIAGNOSIS — C74.01 MALIGNANT NEOPLASM OF CORTEX OF RIGHT ADRENAL GLAND (H): ICD-10-CM

## 2022-09-12 RX ORDER — TEMAZEPAM 15 MG/1
15-30 CAPSULE ORAL
Qty: 60 CAPSULE | Refills: 0 | Status: SHIPPED | OUTPATIENT
Start: 2022-09-12 | End: 2022-10-17

## 2022-09-12 NOTE — TELEPHONE ENCOUNTER
Signed Prescriptions:                        Disp   Refills    temazepam (RESTORIL) 15 MG capsule         60 cap*0        Sig: Take 1-2 capsules (15-30 mg) by mouth nightly as           needed for sleep  Authorizing Provider: SANDRA CRUZ, RN, BSN, OCN  Nurse Care Coordinator  [facility] [place]  P: [phone]     F: [phone]

## 2022-09-12 NOTE — TELEPHONE ENCOUNTER
Pending Prescriptions:                       Disp   Refills    temazepam (RESTORIL) 15 MG capsule        60 cap*0            Sig: Take 1-2 capsules (15-30 mg) by mouth nightly as           needed for sleep          Last Written Prescription Date:  8/17/22  Last Fill Quantity: 60,   # refills: 0  Last Office Visit: 8/24/22  Future Office visit: 11/1/22 with Dr. Hernadez    Routing refill request to provider for review/approval.    Luly Curry, RN, BSN, OCN  Nurse Care Coordinator  [facility] [place]  P: [phone]     F: [phone]

## 2022-09-14 DIAGNOSIS — F41.9 ANXIETY: ICD-10-CM

## 2022-09-14 NOTE — TELEPHONE ENCOUNTER
[facility] Triage - NO  ACCESS    LM for patient to call [phone] option 5 option 2 and ask for any nurse    Last prescribing provider: ANTHONY    Last clinic visit date: 8/22    Any missed appointments or no-shows since last clinic visit?: NO    Recommendations for requested medication (if none, N/A): NA    Next clinic visit date:11/1/22    Any other pertinent information (if none, N/A): NA    Routing to Dr. Hernadez    How are you taking: 3 tablets at HS for restless legs      Are you having any side effects:NO    How many do you have left: 12 left needs on Sunday - Pharmacy is closed on Weekends    Is this managing your pain:Yes    What pharmacy: [facility]

## 2022-09-16 RX ORDER — GABAPENTIN 300 MG/1
CAPSULE ORAL
Qty: 270 CAPSULE | Refills: 3 | Status: SHIPPED | OUTPATIENT
Start: 2022-09-16 | End: 2023-03-21

## 2022-09-26 ENCOUNTER — VIRTUAL VISIT (OUTPATIENT)
Dept: ENDOCRINOLOGY | Facility: CLINIC | Age: 37
End: 2022-09-26
Payer: COMMERCIAL

## 2022-09-26 DIAGNOSIS — C74.01 ADRENAL CORTEX CANCER, RIGHT (H): Primary | ICD-10-CM

## 2022-09-26 DIAGNOSIS — E03.8 SECONDARY HYPOTHYROIDISM: ICD-10-CM

## 2022-09-26 DIAGNOSIS — E27.40 ADRENAL INSUFFICIENCY (H): ICD-10-CM

## 2022-09-26 PROCEDURE — 99215 OFFICE O/P EST HI 40 MIN: CPT | Mod: 95 | Performed by: INTERNAL MEDICINE

## 2022-09-26 ASSESSMENT — PATIENT HEALTH QUESTIONNAIRE - PHQ9
SUM OF ALL RESPONSES TO PHQ QUESTIONS 1-9: 14
10. IF YOU CHECKED OFF ANY PROBLEMS, HOW DIFFICULT HAVE THESE PROBLEMS MADE IT FOR YOU TO DO YOUR WORK, TAKE CARE OF THINGS AT HOME, OR GET ALONG WITH OTHER PEOPLE: VERY DIFFICULT
SUM OF ALL RESPONSES TO PHQ QUESTIONS 1-9: 14

## 2022-09-26 NOTE — NURSING NOTE
Chief Complaint   Patient presents with     Video Visit     7/3 Rescheduled from 8/8-MS// follow up adrenal mass,    Patient takes 200mg gabapentin in the am and 300 mg in the pm.  Shahrzad Nolan 9/26/22

## 2022-09-26 NOTE — LETTER
"9/26/2022       RE: Suzy Rodríguez  [address]  [facility] [address]     Dear Colleague,    Thank you for referring your patient, Suzy Rodríguez, to the [facility] [place] [facility]. Please see a copy of my visit note below.    Suzy is a 36 year old who is being evaluated via a billable video visit.      How would you like to obtain your AVS? MyChart  If the video visit is dropped, the invitation should be resent by: Text to cell phone: [phone]  Will anyone else be joining your video visit? No      Video call duration:   Started: 7:32 am   Ended: 7:46 am      Originating Location (pt. Location): Home  Distant Location (provider location):  [facility]  Platform used for Video Visit: Mobicow    The patient is seen in f/up for adrenocortical cancer.     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  The adrenal mass was first identified in May 2018, on an abdominal CT done for evaluation of kidney stones.  The mass measured 9.2 x 8 cm on the noncontrast CT from 5/8/18, and was inseparable from the liver and upper pole of the right kidney.  The chest CT from 6/14/18 identified 2 indeterminate solid pulmonary nodules, with the largest measuring 3 mm in the subpleural posterior left lower lobe.  The patient underwent right adrenalectomy on 6/25/18.  Surgery was complicated by diaphragmatic injury during hepatic mobilization and small fracture of the right liver lobe, managed conservatively.  The pathology report (re read at [facility]) revealed the following:  \"Adrenal cortical carcinoma, low grade, oncocytic type, forming a mass 11.3 cm and weighing 412 grams (per report). Surgical resection margins are positive for tumor (per report). Lymphovascular invasion is focally " "present. The tumor seems to be confined to the adrenal gland.  AJCC Stage (with available surgical materials): pT2NX (8th edition, 2017). Immunoperoxidase stains were performed on paraffin sections at the referring institution and reviewed at [facility] in [place], [place].  Tumor cells are positive for calretinin, synaptophysin and Melan A, supporting the above diagnosis. Ki-67 reveal an increased proliferation index of approximately 20% in the most active areas.\"    Treatment with mitotane was started in July 2018 and she completed radiation to the adrenal bed on October 8, 2018.  Titrating up the dose of mitotane was difficult. It was discontinued by Dr. Liang from [facility], in May 2021. The Mitotane level from 5/12/22 was undetectable.  The fludrocortisone was discontinued shortly after mitotane was stopped.     On lab work from 5/12/2022, electrolytes were normal, 24-hour urinary cortisol was elevated at 64 (less than 45), serum cortisol checked 3 to 4 hours following the administration of 15 mg HC in am was 11.4, while ACTH was undetectable. Following these results, the dose of hydrocortisone was gradually tapered down to 10 mg hydrocortisone in the morning and 5 mg in the afternoon.    As a result of treatment with mitotane, she developed secondary hypothyroidism, which was treated with 125 mcg levothyroxine daily.  Following the discontinuation of mitotane, the dose of levothyroxine was gradually titrated down to 75 mcg daily, which the patient has been taking since May this year.  Most recent TFTs from 5/12/2022 revealed a free T4 of 1.05 and TSH of 0.6.    Suzy confirms she has been taking 7.5 mg hydrocortisone in the morning and 5 mg in the afternoon.  She continues to take 75 mcg levothyroxine daily.    The patient reports being busy for the last few months.  A couple of times, she forgot to take the afternoon dose of hydrocortisone and did not feel any different.  She endorses heat " intolerance and increased sweating.  For the last 3 months, she has been experiencing palpitations, which got progressively worse.  Denies tremor, weight loss, nausea.  The anxiety and the depression worsened and he has a follow-up appointment scheduled with psychiatry in October.  Sleeping has been good as long as she continues to take gabapentin, which was initially recommended for restless leg syndrome.  When she attends lectures at school, approximately once a week, she has noticed some lightheadedness which occurs while sitting down for a prolonged period of time.  This does not happen at home.    She has lab work scheduled at 7:30 in the morning this Thursday.    She has not been taking the calcium and vitamin D regularly.    Most recent chest/abd/pelvic CT from 5/22 was unremarkable. F/u CT is scheduled in October this year.   Brain MRI from 11/2021 unremarkable     Prior data prior to surgery:  5/14/18 normal plasma metanephrines  5/31/18 ALD 13.4, renin 0.8   5/28/18 urinary cortisol 351.6, 8 AM cortisol 23 (after dexamethasone), normal metanephrines     6/5/18 ACTH<10, DHEAS 740 (prior to surgery)    Suzy underwent bariatric surgery in 6/2016. Pre-surgery weight 235 lb, inga post-surgery 135 lbs. Her weight in 4/2017 was 139 lbs. In 3/2018, her weight was 154 lbs. It was 157 lbs prior to adrenalectomy.   According to the patient, her weight has been fairly stable.  It was 155 pounds in our records, in May this year.  7/16/20 vitamin D 51, PTH 59      Past Medical History   Past Medical History:   Diagnosis Date     Adrenal cortex cancer, right (H) 6/30/2018    Resected 6/25/18, Dr. Mansfield.     Adrenal mass (H)      Anemia     thought due to heavy menses.     Calculus of ureter 5/9/2018     Carcinoma, adrenal cortical (H) 7/18/2018     Chocolate cyst of ovary 2011     Clotting disorder (H)      Depression      GERD (gastroesophageal reflux disease)      History of miscarriage      Hypertension due to  "endocrine disorder 2018     Kidney stones     passed on their own     Malignant neoplasm of cortex of right adrenal gland (H) 2018    EOTD; 18.  Check in May. SCP started.  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     SHREYA on CPAP 2015    Stopped using CPAP the  after weight loss as even at lower pressure/adjustment couldn't tolerate the cpap. Resting well, cautioned to watch for any signs of fatigue and consider \"titration study\" in the future to see if cpap is still required at her new weight.     Pneumonia     hx of recurrent pneumonia issues/respiratory infections.     Polycystic ovary syndrome     able to get pregnant, not on meds.     Pulmonary embolism (H)  or so    briefly on wafarin.     Sleep apnea     cpap     Vitamin D deficiency    Diverticulosis  Obstructive sleep apnea which resolved following gastric bypass  GERD  Iron deficiency  Anemia  Ovarian cyst   IUD inserted on 18    Past Surgical History   Past Surgical History:   Procedure Laterality Date     ADRENALECTOMY Right 2018    Procedure: ADRENALECTOMY;  Right Adrenalectomy,  Embolize Liver , Anesthesia Block;  Surgeon: Warren Mansfield MD;  Location: [facility] OR     ADRENALECTOMY       ADRENALECTOMY Right       SECTION      twice      SECTION      2      SECTION      x2     EMBOLECTOMY ABDOMEN N/A 2018    Procedure: EMBOLECTOMY ABDOMEN;;  Surgeon: Ector Mcintyre MD;  Location: [facility] OR     EXTRACORPOREAL SHOCK WAVE LITHOTRIPSY (ESWL) "       GASTRIC BYPASS  2016     LA CYSTO/URETERO W/LITHOTRIPSY &INDWELL STENT INSRT Left 5/11/2018    Procedure: CYSTOSCOPY, LEFT URETEROSCOPY LASER LITHOTRIPSY STENT INSERTION;  Surgeon: Skyler Delvalle MD;  Location: [facility];  Service: Urology     LA LAP GASTRIC BYPASS/DERICK-EN-Y N/A 6/8/2016    Procedure: GASTRIC BYPASS LAPAROSCOPIC DERICK-EN-Y;  Surgeon: Randy Sutherland MD;  Location: [facility];  Service: General     TUBAL LIGATION       WISDOM TOOTH EXTRACTION Bilateral        Current Medications    Current Outpatient Medications:      amphetamine-dextroamphetamine (ADDERALL XR) 20 MG 24 hr capsule, Take 1 capsule (20 mg) by mouth daily, Disp: 30 capsule, Rfl: 0     buPROPion (WELLBUTRIN SR) 150 MG 12 hr tablet, TAKE ONE TABLET BY MOUTH TWICE A DAY, Disp: 180 tablet, Rfl: 1     acetaminophen (TYLENOL) 325 MG tablet, Take 2 tablets (650 mg) by mouth every 4 hours as needed for other (multimodal surgical pain management along with NSAIDS and opioid medication as indicated based on pain control and physical function.) (Patient not taking: Reported on 5/5/2022), Disp: 150 tablet, Rfl: 0     amphetamine-dextroamphetamine (ADDERALL) 20 MG tablet, Take 1 tablet (20 mg) by mouth daily, Disp: 30 tablet, Rfl: 0     calcitRIOL (ROCALTROL) 0.5 MCG capsule, , Disp: , Rfl:      clonazePAM (KLONOPIN) 0.5 MG tablet, Take 1 tablet (0.5 mg) by mouth 2 times daily as needed for anxiety, Disp: 60 tablet, Rfl: 0     FLUoxetine (PROZAC) 20 MG capsule, Take 3 capsules (60 mg) by mouth daily, Disp: 270 capsule, Rfl: 1     gabapentin (NEURONTIN) 300 MG capsule, TAKE THREE CAPSULES BY MOUTH THREE TIMES A DAY, Disp: 270 capsule, Rfl: 3     hydrocortisone (CORTEF) 5 MG tablet, Take 2 tablets (10 mg) in the morning and 1 tablet (5 mg) at 2 PM, Disp: 300 tablet, Rfl: 1     levothyroxine (SYNTHROID/LEVOTHROID) 75 MCG tablet, Take 1 tablet (75 mcg) by mouth daily, Disp: 90 tablet, Rfl: 1     medical cannabis (Patient's own  supply), See Admin Instructions (The purpose of this order is to document that the patient reports taking medical cannabis.  This is not a prescription, and is not used to certify that the patient has a qualifying medical condition.)  (Patient not taking: Reported on 5/5/2022), Disp: , Rfl:      ondansetron (ZOFRAN) 8 MG tablet, Take 1 tablet (8 mg) by mouth every 8 hours as needed for nausea (Patient not taking: Reported on 5/5/2022), Disp: 30 tablet, Rfl: 0     prochlorperazine (COMPAZINE) 5 MG tablet, Take 1 tablet (5 mg) by mouth every 8 hours as needed for nausea or vomiting (Patient not taking: Reported on 5/5/2022), Disp: 90 tablet, Rfl: 3     temazepam (RESTORIL) 15 MG capsule, Take 1-2 capsules (15-30 mg) by mouth nightly as needed for sleep, Disp: 60 capsule, Rfl: 0  Iron supplement daily for 6 months     Family History   Problem Relation Age of Onset     Depression Paternal Grandmother      Diabetes Mother      Diabetes Father      Hypertension Father      Chronic Obstructive Pulmonary Disease Father      Cancer Maternal Grandmother         gastric     Urolithiasis Maternal Grandmother      Heart Disease Maternal Grandfather      Cancer Maternal Grandfather         lung     Heart Disease Paternal Grandfather      Breast Cancer No family hx of      Colon Cancer No family hx of      Prostate Cancer No family hx of      Cerebrovascular Disease No family hx of      Clotting Disorder No family hx of      Gout No family hx of        Social History   with 2 children, 7 and 10-year-old. She denies smoking, drinking alcohol or using illicit drugs. Occupation: disabled.     Review of Systems   10 point review of systems negative unless specified above.              Vital Signs     Previous Weights:    Wt Readings from Last 10 Encounters:   05/05/22 70.3 kg (155 lb)   02/01/22 71 kg (156 lb 8 oz)   01/18/22 70.5 kg (155 lb 8 oz)   10/29/21 71.1 kg (156 lb 11.2 oz)   10/25/21 71.8 kg (158 lb 6.4 oz)   09/03/21  72.2 kg (159 lb 3.2 oz)   21 71.2 kg (157 lb)   21 70.7 kg (155 lb 12.8 oz)   21 68.7 kg (151 lb 6.4 oz)   21 68 kg (150 lb)        There were no vitals taken for this visit.    Physical Exam  General Appearance: alert, no distress noted   Eyes: grossly normal to inspection, conjunctivae and sclerae normal, no lid lag or stare   Respiratory: no audible wheeze, cough, or visible cyanosis.  No visible retractions or increased work of breathing.  Able to speak fully in complete sentences.  Neurological: Cranial nerves grossly intact, mentation intact and speech normal; no tremor of the outstretched hands   Skin: no lesions on exposed skin   Psychological: mentation appears normal, affect normal, judgement and insight intact, normal speech and appearance well-groomed    Lab Results  I reviewed prior lab results documented in Epic.  TSH   Date Value Ref Range Status   2022 0.16 (L) 0.40 - 4.00 mU/L Final   2022 0.08 (L) 0.40 - 4.00 mU/L Final   2022 0.29 (L) 0.40 - 4.00 mU/L Final   2022 0.72 0.40 - 4.00 mU/L Final   2021 0.27 (L) 0.40 - 4.00 mU/L Final   2021 0.22 (L) 0.40 - 4.00 mU/L Final   2021 0.44 0.40 - 4.00 mU/L Final   05/10/2021 0.18 (L) 0.40 - 4.00 mU/L Final   2021 0.39 (L) 0.40 - 4.00 mU/L Final   2021 0.18 (L) 0.40 - 4.00 mU/L Final     T4 Free   Date Value Ref Range Status   2021 0.91 0.76 - 1.46 ng/dL Final   2021 0.93 0.76 - 1.46 ng/dL Final   05/10/2021 0.84 0.76 - 1.46 ng/dL Final   2021 0.76 0.76 - 1.46 ng/dL Final   2021 0.82 0.76 - 1.46 ng/dL Final     Free T4   Date Value Ref Range Status   2022 1.05 0.76 - 1.46 ng/dL Final   2022 1.12 0.76 - 1.46 ng/dL Final   2022 1.11 0.76 - 1.46 ng/dL Final   2021 1.05 0.76 - 1.46 ng/dL Final   10/25/2021 1.04 0.70 - 1.80 ng/dL Final     Comment:     Performance of the Free T4 test has not been established with  specimens (<= 2  months of age).         Assessment     1. 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 Ki-67 revealed an increased proliferation index of approximately 20%.   The tumor was functional and presented with high cortisol and DHEAS levels.  Post surgery/radiation and while undergoing treatment with mitotane (started in July 2018), the DHEAS has remained undetectable.  Adjusting the dose of mitotane to therapeutic levels was challenging, due to side effects.  It was discontinued in May 2021.   Plan:   F/up chest, abd and pelvic CT     2.  Primary adrenal insufficiency, mitotane induced   Clinically, she does not endorses definite symptoms of both hydrocortisone excess or deficiency.   Plan:  Stop taking the afternodoon dose of hydrocortisone for the next 2 days  Have a morning cortisol and ACTH level checked this Thursday, prior to taking the morning dose of 7.5 mg hydrocortisone.    3. Hypothyroidism, presumed to be secondary to mitotane induced suppression of TSH   The patient endorses palpitations, sweating and heat intolerance, symptoms suggestive of hyperthyroidism.   Follow-up TFTs and titrate down the dose of levothyroxine based on lab results.    4.  Status post bariatric surgery  I recommended to take calcium and vitamin D on a regular basis (600 mg twice daily, 2000 units vitamin D daily), in addition to a daily MVI.     Orders Placed This Encounter   Procedures     TSH     T4 free     Cortisol     Adrenal corticotropin     DHEA sulfate     Basic metabolic panel     42 minutes spent on the date of the encounter doing chart review, history and exam, documentation and further activities as noted above.    Answers for HPI/ROS submitted by the patient on 9/26/2022  If you checked off any problems, how difficult have these problems made it for you to do your work, take care of things at home, or get along with other  people?: Very difficult  PHQ9 TOTAL SCORE: 14          Again, thank you for allowing me to participate in the care of your patient.      Sincerely,    Veena Jaquez MD

## 2022-09-26 NOTE — PROGRESS NOTES
"Suzy is a 36 year old who is being evaluated via a billable video visit.      How would you like to obtain your AVS? [email or web address]  If the video visit is dropped, the invitation should be resent by: Text to cell phone: [phone]  Will anyone else be joining your video visit? No      Video call duration:   Started: 7:32 am   Ended: 7:46 am      Originating Location (pt. Location): Home  Distant Location (provider location):  [facility]  Platform used for Video Visit: [employer]    The patient is seen in f/up for adrenocortical cancer.     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  The adrenal mass was first identified in May 2018, on an abdominal CT done for evaluation of kidney stones.  The mass measured 9.2 x 8 cm on the noncontrast CT from 5/8/18, and was inseparable from the liver and upper pole of the right kidney.  The chest CT from 6/14/18 identified 2 indeterminate solid pulmonary nodules, with the largest measuring 3 mm in the subpleural posterior left lower lobe.  The patient underwent right adrenalectomy on 6/25/18.  Surgery was complicated by diaphragmatic injury during hepatic mobilization and small fracture of the right liver lobe, managed conservatively.  The pathology report (re read at [facility]) revealed the following:  \"Adrenal cortical carcinoma, low grade, oncocytic type, forming a mass 11.3 cm and weighing 412 grams (per report). Surgical resection margins are positive for tumor (per report). Lymphovascular invasion is focally present. The tumor seems to be confined to the adrenal gland.  AJCC Stage (with available surgical materials): pT2NX (8th edition, 2017). Immunoperoxidase stains were performed on paraffin sections at the referring institution and reviewed at [facility] in [place], [place].  Tumor cells are positive for calretinin, " "synaptophysin and Melan A, supporting the above diagnosis. Ki-67 reveal an increased proliferation index of approximately 20% in the most active areas.\"    Treatment with mitotane was started in July 2018 and she completed radiation to the adrenal bed on October 8, 2018.  Titrating up the dose of mitotane was difficult. It was discontinued by Dr. Liang from [facility], in May 2021. The Mitotane level from 5/12/22 was undetectable.  The fludrocortisone was discontinued shortly after mitotane was stopped.     On lab work from 5/12/2022, electrolytes were normal, 24-hour urinary cortisol was elevated at 64 (less than 45), serum cortisol checked 3 to 4 hours following the administration of 15 mg HC in am was 11.4, while ACTH was undetectable. Following these results, the dose of hydrocortisone was gradually tapered down to 10 mg hydrocortisone in the morning and 5 mg in the afternoon.    As a result of treatment with mitotane, she developed secondary hypothyroidism, which was treated with 125 mcg levothyroxine daily.  Following the discontinuation of mitotane, the dose of levothyroxine was gradually titrated down to 75 mcg daily, which the patient has been taking since May this year.  Most recent TFTs from 5/12/2022 revealed a free T4 of 1.05 and TSH of 0.6.    Suzy confirms she has been taking 7.5 mg hydrocortisone in the morning and 5 mg in the afternoon.  She continues to take 75 mcg levothyroxine daily.    The patient reports being busy for the last few months.  A couple of times, she forgot to take the afternoon dose of hydrocortisone and did not feel any different.  She endorses heat intolerance and increased sweating.  For the last 3 months, she has been experiencing palpitations, which got progressively worse.  Denies tremor, weight loss, nausea.  The anxiety and the depression worsened and he has a follow-up appointment scheduled with psychiatry in October.  Sleeping has been good as long as she " continues to take gabapentin, which was initially recommended for restless leg syndrome.  When she attends lectures at school, approximately once a week, she has noticed some lightheadedness which occurs while sitting down for a prolonged period of time.  This does not happen at home.    She has lab work scheduled at 7:30 in the morning this Thursday.    She has not been taking the calcium and vitamin D regularly.    Most recent chest/abd/pelvic CT from 5/22 was unremarkable. F/u CT is scheduled in October this year.   Brain MRI from 11/2021 unremarkable     Prior data prior to surgery:  5/14/18 normal plasma metanephrines  5/31/18 ALD 13.4, renin 0.8   5/28/18 urinary cortisol 351.6, 8 AM cortisol 23 (after dexamethasone), normal metanephrines     6/5/18 ACTH<10, DHEAS 740 (prior to surgery)    Suzy underwent bariatric surgery in 6/2016. Pre-surgery weight 235 lb, inga post-surgery 135 lbs. Her weight in 4/2017 was 139 lbs. In 3/2018, her weight was 154 lbs. It was 157 lbs prior to adrenalectomy.   According to the patient, her weight has been fairly stable.  It was 155 pounds in our records, in May this year.  7/16/20 vitamin D 51, PTH 59      Past Medical History   Past Medical History:   Diagnosis Date     Adrenal cortex cancer, right (H) 6/30/2018    Resected 6/25/18, Dr. Mansfield.     Adrenal mass (H)      Anemia     thought due to heavy menses.     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  Check in May. SCP started.  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) June 25, 2018   Cushing's syndrome,  "secondary to #1 (300 mcg/24 hr); Rx hydrocortisone 20 + 10 post op   Post operative defect right hemidiaphragm with ?worsening right effussion, not present preop   Currently on mitotane, 500 mg, BID x 1 week + hydrocortisone     Menstrual disorder     menorrhagia     MTHFR mutation      MTHFR mutation     believed to be homozygous for the mutation.     SHREYA on CPAP 2015    Stopped using CPAP the  of  after weight loss as even at lower pressure/adjustment couldn't tolerate the cpap. Resting well, cautioned to watch for any signs of fatigue and consider \"titration study\" in the future to see if cpap is still required at her new weight.     Pneumonia     hx of recurrent pneumonia issues/respiratory infections.     Polycystic ovary syndrome     able to get pregnant, not on meds.     Pulmonary embolism (H)  or so    briefly on wafarin.     Sleep apnea     cpap     Vitamin D deficiency    Diverticulosis  Obstructive sleep apnea which resolved following gastric bypass  GERD  Iron deficiency  Anemia  Ovarian cyst   IUD inserted on 19    Past Surgical History   Past Surgical History:   Procedure Laterality Date     ADRENALECTOMY Right 2018    Procedure: ADRENALECTOMY;  Right Adrenalectomy,  Embolize Liver , Anesthesia Block;  Surgeon: Warren Mansfield MD;  Location: [facility] OR     ADRENALECTOMY       ADRENALECTOMY Right       SECTION      twice      SECTION      2      SECTION      x2     EMBOLECTOMY ABDOMEN N/A 2018    Procedure: EMBOLECTOMY ABDOMEN;;  Surgeon: Ector Mcintyre MD;  Location: [facility] OR     EXTRACORPOREAL SHOCK WAVE LITHOTRIPSY (ESWL)       GASTRIC BYPASS       HI CYSTO/URETERO W/LITHOTRIPSY &INDWELL STENT INSRT Left 2018    Procedure: CYSTOSCOPY, LEFT URETEROSCOPY LASER LITHOTRIPSY STENT INSERTION;  Surgeon: Skyler Delvalle MD;  Location: [facility] OR;  Service: Urology     HI LAP GASTRIC BYPASS/DERICK-EN-Y N/A 2016    " Procedure: GASTRIC BYPASS LAPAROSCOPIC DERICK-EN-Y;  Surgeon: Randy Sutherland MD;  Location: [facility];  Service: General     TUBAL LIGATION       WISDOM TOOTH EXTRACTION Bilateral        Current Medications    Current Outpatient Medications:      amphetamine-dextroamphetamine (ADDERALL XR) 20 MG 24 hr capsule, Take 1 capsule (20 mg) by mouth daily, Disp: 30 capsule, Rfl: 0     buPROPion (WELLBUTRIN SR) 150 MG 12 hr tablet, TAKE ONE TABLET BY MOUTH TWICE A DAY, Disp: 180 tablet, Rfl: 1     acetaminophen (TYLENOL) 325 MG tablet, Take 2 tablets (650 mg) by mouth every 4 hours as needed for other (multimodal surgical pain management along with NSAIDS and opioid medication as indicated based on pain control and physical function.) (Patient not taking: Reported on 5/5/2022), Disp: 150 tablet, Rfl: 0     amphetamine-dextroamphetamine (ADDERALL) 20 MG tablet, Take 1 tablet (20 mg) by mouth daily, Disp: 30 tablet, Rfl: 0     calcitRIOL (ROCALTROL) 0.5 MCG capsule, , Disp: , Rfl:      clonazePAM (KLONOPIN) 0.5 MG tablet, Take 1 tablet (0.5 mg) by mouth 2 times daily as needed for anxiety, Disp: 60 tablet, Rfl: 0     FLUoxetine (PROZAC) 20 MG capsule, Take 3 capsules (60 mg) by mouth daily, Disp: 270 capsule, Rfl: 1     gabapentin (NEURONTIN) 300 MG capsule, TAKE THREE CAPSULES BY MOUTH THREE TIMES A DAY, Disp: 270 capsule, Rfl: 3     hydrocortisone (CORTEF) 5 MG tablet, Take 2 tablets (10 mg) in the morning and 1 tablet (5 mg) at 2 PM, Disp: 300 tablet, Rfl: 1     levothyroxine (SYNTHROID/LEVOTHROID) 75 MCG tablet, Take 1 tablet (75 mcg) by mouth daily, Disp: 90 tablet, Rfl: 1     medical cannabis (Patient's own supply), See Admin Instructions (The purpose of this order is to document that the patient reports taking medical cannabis.  This is not a prescription, and is not used to certify that the patient has a qualifying medical condition.)  (Patient not taking: Reported on 5/5/2022), Disp: , Rfl:      ondansetron  (ZOFRAN) 8 MG tablet, Take 1 tablet (8 mg) by mouth every 8 hours as needed for nausea (Patient not taking: Reported on 5/5/2022), Disp: 30 tablet, Rfl: 0     prochlorperazine (COMPAZINE) 5 MG tablet, Take 1 tablet (5 mg) by mouth every 8 hours as needed for nausea or vomiting (Patient not taking: Reported on 5/5/2022), Disp: 90 tablet, Rfl: 3     temazepam (RESTORIL) 15 MG capsule, Take 1-2 capsules (15-30 mg) by mouth nightly as needed for sleep, Disp: 60 capsule, Rfl: 0  Iron supplement daily for 6 months     Family History   Problem Relation Age of Onset     Depression Paternal Grandmother      Diabetes Mother      Diabetes Father      Hypertension Father      Chronic Obstructive Pulmonary Disease Father      Cancer Maternal Grandmother         gastric     Urolithiasis Maternal Grandmother      Heart Disease Maternal Grandfather      Cancer Maternal Grandfather         lung     Heart Disease Paternal Grandfather      Breast Cancer No family hx of      Colon Cancer No family hx of      Prostate Cancer No family hx of      Cerebrovascular Disease No family hx of      Clotting Disorder No family hx of      Gout No family hx of        Social History   with 2 children, 7 and 10-year-old. She denies smoking, drinking alcohol or using illicit drugs. Occupation: disabled.     Review of Systems   10 point review of systems negative unless specified above.              Vital Signs     Previous Weights:    Wt Readings from Last 10 Encounters:   05/05/22 70.3 kg (155 lb)   02/01/22 71 kg (156 lb 8 oz)   01/18/22 70.5 kg (155 lb 8 oz)   10/29/21 71.1 kg (156 lb 11.2 oz)   10/25/21 71.8 kg (158 lb 6.4 oz)   09/03/21 72.2 kg (159 lb 3.2 oz)   08/07/21 71.2 kg (157 lb)   07/05/21 70.7 kg (155 lb 12.8 oz)   06/07/21 68.7 kg (151 lb 6.4 oz)   05/17/21 68 kg (150 lb)        There were no vitals taken for this visit.    Physical Exam  General Appearance: alert, no distress noted   Eyes: grossly normal to inspection,  conjunctivae and sclerae normal, no lid lag or stare   Respiratory: no audible wheeze, cough, or visible cyanosis.  No visible retractions or increased work of breathing.  Able to speak fully in complete sentences.  Neurological: Cranial nerves grossly intact, mentation intact and speech normal; no tremor of the outstretched hands   Skin: no lesions on exposed skin   Psychological: mentation appears normal, affect normal, judgement and insight intact, normal speech and appearance well-groomed    Lab Results  I reviewed prior lab results documented in Epic.  TSH   Date Value Ref Range Status   2022 0.16 (L) 0.40 - 4.00 mU/L Final   2022 0.08 (L) 0.40 - 4.00 mU/L Final   2022 0.29 (L) 0.40 - 4.00 mU/L Final   2022 0.72 0.40 - 4.00 mU/L Final   2021 0.27 (L) 0.40 - 4.00 mU/L Final   2021 0.22 (L) 0.40 - 4.00 mU/L Final   2021 0.44 0.40 - 4.00 mU/L Final   05/10/2021 0.18 (L) 0.40 - 4.00 mU/L Final   2021 0.39 (L) 0.40 - 4.00 mU/L Final   2021 0.18 (L) 0.40 - 4.00 mU/L Final     T4 Free   Date Value Ref Range Status   2021 0.91 0.76 - 1.46 ng/dL Final   2021 0.93 0.76 - 1.46 ng/dL Final   05/10/2021 0.84 0.76 - 1.46 ng/dL Final   2021 0.76 0.76 - 1.46 ng/dL Final   2021 0.82 0.76 - 1.46 ng/dL Final     Free T4   Date Value Ref Range Status   2022 1.05 0.76 - 1.46 ng/dL Final   2022 1.12 0.76 - 1.46 ng/dL Final   2022 1.11 0.76 - 1.46 ng/dL Final   2021 1.05 0.76 - 1.46 ng/dL Final   10/25/2021 1.04 0.70 - 1.80 ng/dL Final     Comment:     Performance of the Free T4 test has not been established with  specimens (<= 2 months of age).         Assessment     1. 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 Ki-67 revealed an increased proliferation index of approximately 20%.   The tumor was functional and presented with  high cortisol and DHEAS levels.  Post surgery/radiation and while undergoing treatment with mitotane (started in July 2018), the DHEAS has remained undetectable.  Adjusting the dose of mitotane to therapeutic levels was challenging, due to side effects.  It was discontinued in May 2021.   Plan:   F/up chest, abd and pelvic CT     2.  Primary adrenal insufficiency, mitotane induced   Clinically, she does not endorses definite symptoms of both hydrocortisone excess or deficiency.   Plan:  Stop taking the afternodoon dose of hydrocortisone for the next 2 days  Have a morning cortisol and ACTH level checked this Thursday, prior to taking the morning dose of 7.5 mg hydrocortisone.    3. Hypothyroidism, presumed to be secondary to mitotane induced suppression of TSH   The patient endorses palpitations, sweating and heat intolerance, symptoms suggestive of hyperthyroidism.   Follow-up TFTs and titrate down the dose of levothyroxine based on lab results.    4.  Status post bariatric surgery  I recommended to take calcium and vitamin D on a regular basis (600 mg twice daily, 2000 units vitamin D daily), in addition to a daily MVI.     Orders Placed This Encounter   Procedures     TSH     T4 free     Cortisol     Adrenal corticotropin     DHEA sulfate     Basic metabolic panel     42 minutes spent on the date of the encounter doing chart review, history and exam, documentation and further activities as noted above.    Answers for HPI/ROS submitted by the patient on 9/26/2022  If you checked off any problems, how difficult have these problems made it for you to do your work, take care of things at home, or get along with other people?: Very difficult  PHQ9 TOTAL SCORE: 14

## 2022-09-29 ENCOUNTER — LAB (OUTPATIENT)
Dept: LAB | Facility: CLINIC | Age: 37
End: 2022-09-29
Payer: COMMERCIAL

## 2022-09-29 DIAGNOSIS — E03.8 SECONDARY HYPOTHYROIDISM: ICD-10-CM

## 2022-09-29 DIAGNOSIS — Z13.9 SCREENING FOR CONDITION: Primary | ICD-10-CM

## 2022-09-29 DIAGNOSIS — C74.01 ADRENAL CORTEX CANCER, RIGHT (H): ICD-10-CM

## 2022-09-29 DIAGNOSIS — C74.91: ICD-10-CM

## 2022-09-29 DIAGNOSIS — E27.40 ADRENAL INSUFFICIENCY (H): ICD-10-CM

## 2022-09-29 LAB
ALBUMIN SERPL BCG-MCNC: 3.9 G/DL (ref 3.5–5.2)
ALP SERPL-CCNC: 145 U/L (ref 35–104)
ALT SERPL W P-5'-P-CCNC: 20 U/L (ref 10–35)
ANION GAP SERPL CALCULATED.3IONS-SCNC: 10 MMOL/L (ref 7–15)
ANION GAP SERPL CALCULATED.3IONS-SCNC: 10 MMOL/L (ref 7–15)
AST SERPL W P-5'-P-CCNC: 20 U/L (ref 10–35)
BASOPHILS # BLD AUTO: 0 10E3/UL (ref 0–0.2)
BASOPHILS NFR BLD AUTO: 1 %
BILIRUB SERPL-MCNC: 0.2 MG/DL
BUN SERPL-MCNC: 9.2 MG/DL (ref 6–20)
BUN SERPL-MCNC: 9.6 MG/DL (ref 6–20)
CALCIUM SERPL-MCNC: 8.4 MG/DL (ref 8.6–10)
CALCIUM SERPL-MCNC: 8.4 MG/DL (ref 8.6–10)
CHLORIDE SERPL-SCNC: 104 MMOL/L (ref 98–107)
CHLORIDE SERPL-SCNC: 104 MMOL/L (ref 98–107)
CHOLEST SERPL-MCNC: 185 MG/DL
CORTIS SERPL-MCNC: 16.3 UG/DL
CREAT SERPL-MCNC: 0.8 MG/DL (ref 0.51–0.95)
CREAT SERPL-MCNC: 0.82 MG/DL (ref 0.51–0.95)
DEPRECATED HCO3 PLAS-SCNC: 27 MMOL/L (ref 22–29)
DEPRECATED HCO3 PLAS-SCNC: 27 MMOL/L (ref 22–29)
EOSINOPHIL # BLD AUTO: 0.1 10E3/UL (ref 0–0.7)
EOSINOPHIL NFR BLD AUTO: 4 %
ERYTHROCYTE [DISTWIDTH] IN BLOOD BY AUTOMATED COUNT: 14.3 % (ref 10–15)
GFR SERPL CREATININE-BSD FRML MDRD: >90 ML/MIN/1.73M2
GFR SERPL CREATININE-BSD FRML MDRD: >90 ML/MIN/1.73M2
GLUCOSE SERPL-MCNC: 91 MG/DL (ref 70–99)
GLUCOSE SERPL-MCNC: 92 MG/DL (ref 70–99)
HCT VFR BLD AUTO: 37.4 % (ref 35–47)
HDLC SERPL-MCNC: 87 MG/DL
HGB BLD-MCNC: 11.7 G/DL (ref 11.7–15.7)
LDLC SERPL CALC-MCNC: 78 MG/DL
LYMPHOCYTES # BLD AUTO: 1.2 10E3/UL (ref 0.8–5.3)
LYMPHOCYTES NFR BLD AUTO: 31 %
MCH RBC QN AUTO: 26.8 PG (ref 26.5–33)
MCHC RBC AUTO-ENTMCNC: 31.3 G/DL (ref 31.5–36.5)
MCV RBC AUTO: 86 FL (ref 78–100)
MONOCYTES # BLD AUTO: 0.3 10E3/UL (ref 0–1.3)
MONOCYTES NFR BLD AUTO: 8 %
NEUTROPHILS # BLD AUTO: 2.1 10E3/UL (ref 1.6–8.3)
NEUTROPHILS NFR BLD AUTO: 56 %
NONHDLC SERPL-MCNC: 98 MG/DL
PLATELET # BLD AUTO: 255 10E3/UL (ref 150–450)
POTASSIUM SERPL-SCNC: 4.2 MMOL/L (ref 3.4–5.3)
POTASSIUM SERPL-SCNC: 4.3 MMOL/L (ref 3.4–5.3)
PROT SERPL-MCNC: 6 G/DL (ref 6.4–8.3)
RBC # BLD AUTO: 4.36 10E6/UL (ref 3.8–5.2)
SODIUM SERPL-SCNC: 141 MMOL/L (ref 136–145)
SODIUM SERPL-SCNC: 141 MMOL/L (ref 136–145)
T4 FREE SERPL-MCNC: 1.02 NG/DL (ref 0.9–1.7)
TRIGL SERPL-MCNC: 101 MG/DL
TSH SERPL DL<=0.005 MIU/L-ACNC: 1.82 UIU/ML (ref 0.3–4.2)
WBC # BLD AUTO: 3.7 10E3/UL (ref 4–11)

## 2022-09-29 PROCEDURE — 36415 COLL VENOUS BLD VENIPUNCTURE: CPT

## 2022-09-29 PROCEDURE — 80050 GENERAL HEALTH PANEL: CPT

## 2022-09-29 PROCEDURE — 80061 LIPID PANEL: CPT

## 2022-09-29 PROCEDURE — 84244 ASSAY OF RENIN: CPT | Mod: 90

## 2022-09-29 PROCEDURE — 82627 DEHYDROEPIANDROSTERONE: CPT

## 2022-09-29 PROCEDURE — 84439 ASSAY OF FREE THYROXINE: CPT

## 2022-09-29 PROCEDURE — 82024 ASSAY OF ACTH: CPT

## 2022-09-29 PROCEDURE — 82533 TOTAL CORTISOL: CPT

## 2022-09-29 PROCEDURE — 99000 SPECIMEN HANDLING OFFICE-LAB: CPT

## 2022-09-30 LAB
ACTH PLAS-MCNC: 41 PG/ML
DHEA-S SERPL-MCNC: <15 UG/DL (ref 35–430)

## 2022-10-04 ENCOUNTER — MYC MEDICAL ADVICE (OUTPATIENT)
Dept: ENDOCRINOLOGY | Facility: CLINIC | Age: 37
End: 2022-10-04

## 2022-10-04 DIAGNOSIS — R74.8 ELEVATED ALKALINE PHOSPHATASE LEVEL: ICD-10-CM

## 2022-10-04 DIAGNOSIS — C74.01 ADRENAL CORTEX CANCER, RIGHT (H): Primary | ICD-10-CM

## 2022-10-04 DIAGNOSIS — E03.8 SECONDARY HYPOTHYROIDISM: Primary | ICD-10-CM

## 2022-10-04 RX ORDER — LEVOTHYROXINE SODIUM 25 UG/1
25 TABLET ORAL DAILY
Qty: 90 TABLET | Refills: 3 | Status: SHIPPED | OUTPATIENT
Start: 2022-10-04 | End: 2022-10-30

## 2022-10-10 LAB — RENIN PLAS-CCNC: 3.2 NG/ML/HR

## 2022-10-13 ENCOUNTER — ALLIED HEALTH/NURSE VISIT (OUTPATIENT)
Dept: FAMILY MEDICINE | Facility: CLINIC | Age: 37
End: 2022-10-13
Payer: COMMERCIAL

## 2022-10-13 VITALS — DIASTOLIC BLOOD PRESSURE: 77 MMHG | HEART RATE: 80 BPM | SYSTOLIC BLOOD PRESSURE: 115 MMHG

## 2022-10-13 DIAGNOSIS — I15.2 HYPERTENSION DUE TO ENDOCRINE DISORDER: Primary | ICD-10-CM

## 2022-10-13 PROCEDURE — 99207 PR NO CHARGE NURSE ONLY: CPT | Performed by: FAMILY MEDICINE

## 2022-10-13 NOTE — NURSING NOTE
Suzy Rodríguez was evaluated at [facility] on October 13, 2022 at which time her blood pressure was:    BP Readings from Last 3 Encounters:   10/13/22 115/77   02/01/22 116/71   01/18/22 117/83     Pulse Readings from Last 3 Encounters:   10/13/22 80   02/01/22 88   01/18/22 76       Reviewed lifestyle modifications for blood pressure control and reduction: including making healthy food choices, managing weight, getting regular exercise, smoking cessation, reducing alcohol consumption, monitoring blood pressure regularly.     Symptoms: None    BP Goal:< 140/90 mmHg    BP Assessment:  BP at goal    Potential Reasons for BP too high: NA - Not applicable    BP Follow-Up Plan: Recheck BP in 6 months at pharmacy    Recommendation to Provider: none    Note completed by: Benita Hood, PharmD  [facility]  [phone]

## 2022-10-17 ENCOUNTER — MYC REFILL (OUTPATIENT)
Dept: ONCOLOGY | Facility: CLINIC | Age: 37
End: 2022-10-17

## 2022-10-17 DIAGNOSIS — F51.01 PRIMARY INSOMNIA: ICD-10-CM

## 2022-10-17 DIAGNOSIS — C74.01 MALIGNANT NEOPLASM OF CORTEX OF RIGHT ADRENAL GLAND (H): ICD-10-CM

## 2022-10-17 RX ORDER — TEMAZEPAM 15 MG/1
15-30 CAPSULE ORAL
Qty: 60 CAPSULE | Refills: 0 | Status: SHIPPED | OUTPATIENT
Start: 2022-10-17 | End: 2022-11-13

## 2022-10-17 NOTE — TELEPHONE ENCOUNTER
Medication: Temazepam 15 mg capsule  Last prescribing provider: Dr. Hernadez on 9/12/22    Last clinic visit date: 8/24/22    Any missed appointments or no-shows since last clinic visit?: No    Recommendations for requested medication (if none, N/A): NA    Next clinic visit date: 11/1/22    Any other pertinent information (if none, N/A): NA    Pended and Routed to Provider.

## 2022-10-18 RX ORDER — TEMAZEPAM 15 MG/1
15-30 CAPSULE ORAL
Qty: 60 CAPSULE | Refills: 0 | OUTPATIENT
Start: 2022-10-18

## 2022-10-25 ENCOUNTER — ANCILLARY PROCEDURE (OUTPATIENT)
Dept: CT IMAGING | Facility: CLINIC | Age: 37
End: 2022-10-25
Attending: INTERNAL MEDICINE
Payer: COMMERCIAL

## 2022-10-25 ENCOUNTER — LAB (OUTPATIENT)
Dept: LAB | Facility: CLINIC | Age: 37
End: 2022-10-25
Payer: COMMERCIAL

## 2022-10-25 DIAGNOSIS — C74.01 ADRENAL CORTEX CANCER, RIGHT (H): ICD-10-CM

## 2022-10-25 DIAGNOSIS — F41.9 ANXIETY: ICD-10-CM

## 2022-10-25 DIAGNOSIS — R74.8 ELEVATED ALKALINE PHOSPHATASE LEVEL: ICD-10-CM

## 2022-10-25 DIAGNOSIS — F51.01 PRIMARY INSOMNIA: ICD-10-CM

## 2022-10-25 DIAGNOSIS — C74.91: ICD-10-CM

## 2022-10-25 DIAGNOSIS — G47.33 OSA (OBSTRUCTIVE SLEEP APNEA): ICD-10-CM

## 2022-10-25 DIAGNOSIS — C74.01 MALIGNANT NEOPLASM OF CORTEX OF RIGHT ADRENAL GLAND (H): ICD-10-CM

## 2022-10-25 DIAGNOSIS — E27.40 ADRENAL INSUFFICIENCY (H): ICD-10-CM

## 2022-10-25 LAB
ALBUMIN SERPL BCG-MCNC: 4.3 G/DL (ref 3.5–5.2)
ALP SERPL-CCNC: 139 U/L (ref 35–104)
ALT SERPL W P-5'-P-CCNC: 13 U/L (ref 10–35)
ANION GAP SERPL CALCULATED.3IONS-SCNC: 8 MMOL/L (ref 7–15)
AST SERPL W P-5'-P-CCNC: 20 U/L (ref 10–35)
BASOPHILS # BLD AUTO: 0 10E3/UL (ref 0–0.2)
BASOPHILS NFR BLD AUTO: 1 %
BILIRUB SERPL-MCNC: 0.3 MG/DL
BUN SERPL-MCNC: 9.1 MG/DL (ref 6–20)
CALCIUM SERPL-MCNC: 9.1 MG/DL (ref 8.6–10)
CHLORIDE SERPL-SCNC: 104 MMOL/L (ref 98–107)
CORTIS SERPL-MCNC: 9.9 UG/DL
CREAT SERPL-MCNC: 0.75 MG/DL (ref 0.51–0.95)
DEPRECATED HCO3 PLAS-SCNC: 27 MMOL/L (ref 22–29)
EOSINOPHIL # BLD AUTO: 0.1 10E3/UL (ref 0–0.7)
EOSINOPHIL NFR BLD AUTO: 3 %
ERYTHROCYTE [DISTWIDTH] IN BLOOD BY AUTOMATED COUNT: 13.9 % (ref 10–15)
GFR SERPL CREATININE-BSD FRML MDRD: >90 ML/MIN/1.73M2
GLUCOSE SERPL-MCNC: 91 MG/DL (ref 70–99)
HCT VFR BLD AUTO: 39.5 % (ref 35–47)
HGB BLD-MCNC: 12.5 G/DL (ref 11.7–15.7)
IMM GRANULOCYTES # BLD: 0 10E3/UL
IMM GRANULOCYTES NFR BLD: 0 %
LYMPHOCYTES # BLD AUTO: 1.2 10E3/UL (ref 0.8–5.3)
LYMPHOCYTES NFR BLD AUTO: 34 %
MCH RBC QN AUTO: 26.8 PG (ref 26.5–33)
MCHC RBC AUTO-ENTMCNC: 31.6 G/DL (ref 31.5–36.5)
MCV RBC AUTO: 85 FL (ref 78–100)
MONOCYTES # BLD AUTO: 0.3 10E3/UL (ref 0–1.3)
MONOCYTES NFR BLD AUTO: 8 %
NEUTROPHILS # BLD AUTO: 1.9 10E3/UL (ref 1.6–8.3)
NEUTROPHILS NFR BLD AUTO: 54 %
NRBC # BLD AUTO: 0 10E3/UL
NRBC BLD AUTO-RTO: 0 /100
PLATELET # BLD AUTO: 253 10E3/UL (ref 150–450)
POTASSIUM SERPL-SCNC: 4.2 MMOL/L (ref 3.4–5.3)
PROT SERPL-MCNC: 6.5 G/DL (ref 6.4–8.3)
RBC # BLD AUTO: 4.67 10E6/UL (ref 3.8–5.2)
SODIUM SERPL-SCNC: 139 MMOL/L (ref 136–145)
T4 FREE SERPL-MCNC: 0.94 NG/DL (ref 0.9–1.7)
TSH SERPL DL<=0.005 MIU/L-ACNC: 3.14 UIU/ML (ref 0.3–4.2)
WBC # BLD AUTO: 3.5 10E3/UL (ref 4–11)

## 2022-10-25 PROCEDURE — 80050 GENERAL HEALTH PANEL: CPT | Performed by: PATHOLOGY

## 2022-10-25 PROCEDURE — 74177 CT ABD & PELVIS W/CONTRAST: CPT | Performed by: RADIOLOGY

## 2022-10-25 PROCEDURE — 82627 DEHYDROEPIANDROSTERONE: CPT | Mod: 90 | Performed by: PATHOLOGY

## 2022-10-25 PROCEDURE — 84439 ASSAY OF FREE THYROXINE: CPT | Mod: 90 | Performed by: PATHOLOGY

## 2022-10-25 PROCEDURE — 84244 ASSAY OF RENIN: CPT | Mod: 90 | Performed by: PATHOLOGY

## 2022-10-25 PROCEDURE — 71260 CT THORAX DX C+: CPT | Performed by: RADIOLOGY

## 2022-10-25 PROCEDURE — 82533 TOTAL CORTISOL: CPT | Mod: 90 | Performed by: PATHOLOGY

## 2022-10-25 PROCEDURE — 99000 SPECIMEN HANDLING OFFICE-LAB: CPT | Mod: GA | Performed by: PATHOLOGY

## 2022-10-25 PROCEDURE — 36415 COLL VENOUS BLD VENIPUNCTURE: CPT | Mod: GA | Performed by: PATHOLOGY

## 2022-10-25 PROCEDURE — 84080 ASSAY ALKALINE PHOSPHATASES: CPT | Mod: 90 | Performed by: PATHOLOGY

## 2022-10-25 PROCEDURE — 82024 ASSAY OF ACTH: CPT | Mod: 90 | Performed by: PATHOLOGY

## 2022-10-25 PROCEDURE — 82088 ASSAY OF ALDOSTERONE: CPT | Mod: 90 | Performed by: PATHOLOGY

## 2022-10-25 RX ORDER — IOPAMIDOL 755 MG/ML
86 INJECTION, SOLUTION INTRAVASCULAR ONCE
Status: COMPLETED | OUTPATIENT
Start: 2022-10-25 | End: 2022-10-25

## 2022-10-25 RX ADMIN — IOPAMIDOL 86 ML: 755 INJECTION, SOLUTION INTRAVASCULAR at 10:17

## 2022-10-26 LAB
ACTH PLAS-MCNC: 28 PG/ML
DHEA-S SERPL-MCNC: <15 UG/DL (ref 35–430)

## 2022-10-28 ENCOUNTER — MYC REFILL (OUTPATIENT)
Dept: FAMILY MEDICINE | Facility: CLINIC | Age: 37
End: 2022-10-28

## 2022-10-28 DIAGNOSIS — F98.8 ATTENTION DEFICIT DISORDER, UNSPECIFIED HYPERACTIVITY PRESENCE: ICD-10-CM

## 2022-10-28 LAB
ALDOST SERPL-MCNC: 8.8 NG/DL (ref 0–31)
ALP BONE SERPL-MCNC: 24.3 UG/L

## 2022-10-28 RX ORDER — DEXTROAMPHETAMINE SACCHARATE, AMPHETAMINE ASPARTATE, DEXTROAMPHETAMINE SULFATE AND AMPHETAMINE SULFATE 5; 5; 5; 5 MG/1; MG/1; MG/1; MG/1
20 TABLET ORAL DAILY
Qty: 30 TABLET | Refills: 0 | Status: SHIPPED | OUTPATIENT
Start: 2022-10-28 | End: 2022-11-21

## 2022-10-28 NOTE — TELEPHONE ENCOUNTER
Routing refill request to provider for review/approval because:  Drug not on the [facility] refill protocol   Johan Bazzi RN

## 2022-10-29 LAB
ALP BONE SERPL-CCNC: 58 U/L
ALP LIVER SERPL-CCNC: 78 U/L
ALP OTHER SERPL-CCNC: 0 U/L
ALP SERPL-CCNC: 136 U/L
RENIN PLAS-CCNC: 4.1 NG/ML/HR

## 2022-10-30 DIAGNOSIS — M83.9 ADULT OSTEOMALACIA, UNSPECIFIED: ICD-10-CM

## 2022-10-30 DIAGNOSIS — R74.8 ELEVATED ALKALINE PHOSPHATASE LEVEL: Primary | ICD-10-CM

## 2022-10-31 ENCOUNTER — LAB (OUTPATIENT)
Dept: LAB | Facility: CLINIC | Age: 37
End: 2022-10-31
Payer: COMMERCIAL

## 2022-10-31 DIAGNOSIS — Z79.899 HIGH RISK MEDICATION USE: Primary | ICD-10-CM

## 2022-10-31 DIAGNOSIS — C74.01 MALIGNANT NEOPLASM OF CORTEX OF RIGHT ADRENAL GLAND (H): Primary | ICD-10-CM

## 2022-10-31 DIAGNOSIS — E27.40 ADRENAL INSUFFICIENCY (H): ICD-10-CM

## 2022-10-31 LAB
AMPHETAMINES UR QL: DETECTED
BARBITURATES UR QL SCN: NOT DETECTED
BENZODIAZ UR QL SCN: DETECTED
BUPRENORPHINE UR QL: NOT DETECTED
CANNABINOIDS UR QL: DETECTED
COCAINE UR QL SCN: NOT DETECTED
D-METHAMPHET UR QL: NOT DETECTED
METHADONE UR QL SCN: NOT DETECTED
OPIATES UR QL SCN: NOT DETECTED
OXYCODONE UR QL SCN: NOT DETECTED
PCP UR QL SCN: NOT DETECTED
PROPOXYPH UR QL: NOT DETECTED
TRICYCLICS UR QL SCN: NOT DETECTED

## 2022-10-31 PROCEDURE — 80306 DRUG TEST PRSMV INSTRMNT: CPT

## 2022-11-01 ENCOUNTER — VIRTUAL VISIT (OUTPATIENT)
Dept: ONCOLOGY | Facility: CLINIC | Age: 37
End: 2022-11-01
Attending: INTERNAL MEDICINE
Payer: COMMERCIAL

## 2022-11-01 DIAGNOSIS — K31.7 POLYP OF DUODENUM: ICD-10-CM

## 2022-11-01 DIAGNOSIS — E27.40 ADRENAL INSUFFICIENCY (H): ICD-10-CM

## 2022-11-01 DIAGNOSIS — F41.9 ANXIETY: ICD-10-CM

## 2022-11-01 DIAGNOSIS — C74.01 MALIGNANT NEOPLASM OF CORTEX OF RIGHT ADRENAL GLAND (H): Primary | ICD-10-CM

## 2022-11-01 DIAGNOSIS — F51.01 PRIMARY INSOMNIA: ICD-10-CM

## 2022-11-01 PROCEDURE — G0463 HOSPITAL OUTPT CLINIC VISIT: HCPCS | Mod: PN,RTG | Performed by: INTERNAL MEDICINE

## 2022-11-01 PROCEDURE — 99214 OFFICE O/P EST MOD 30 MIN: CPT | Mod: 95 | Performed by: INTERNAL MEDICINE

## 2022-11-01 NOTE — PROGRESS NOTES
"  [facility] Oncology Followup  Nov 1, 2022     REFERRING PROVIDER: Warren Mansfield MD from [facility] Urologic Oncology clinic.      REASON FOR CURRENT VISIT: Follow-up for adrenocortical carcinoma,    ONCOLOGIC HISTORY:  1. 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   - CT abdomen and pelvis stone protocol without contrast 5 8014 showed \"9.2 x 8 cm heterogeneous right upper quadrant mass with small foci of calcification within it which is likely arising from the adrenal gland though inseparable from liver and upper pole of right kidney. It is incompletely evaluated on this noncontrast study. 3 x 2.6 cm mass right lobe of liver on image #85 also incompletely evaluated. 6 x 4 x 4 mm upper third left ureteral obstructing stone with mild to moderate secondary hydronephrosis. Collection of stones at lower pole left kidney extending over an area of approximately 6 x 7 x 4 mm. Additional nonobstructing 1 mm stone upper pole left kidney. 2 mm nonobstructing stone noted upper pole right kidney with no hydronephrosis on the right. Postop change consistent with gastric bypass. Noncontrast images of spleen, left adrenal gland, gallbladder, and pancreas unremarkable. No aneurysm. No adenopathy.\"  - Seen by Dr. Delvalle at [facility] clinic. Referred to Dr. Alvino Patel and then Dr. Warren Mansfield.  - Endocrinology team directed workup showed high DHEAS level of 740 pre-surgery.   - Right open adrenalectomy and hepatic mobilization performed by Dr. Warren Mansfield on 6/25/2018. Pathology showed adrenocortical carcinoma measuring 11.3 cm, weighing 413 g with extension into or through the adrenal capsule negative lymphovascular invasion, tumor necrosis present, margins involved by tumor, no regional lymph nodes identified, pathologic " stage T2 NX.  pathology review reveals low grade ACC.  - DHEAS normalized postsurgery.   - Adjuvant Mitotane started on 7/24/18. Dose increased to 2000mg TID around 10/23/18 due to persistently low troughs. Held 1/16/19 for two weeks and resumed 1/30 at 1000 mg po BID due to very poor tolerance of higher doses. Highest mitotane level was 10.2 on 2/11/19. Mitotane troughs did not rise above 10 despite increase in dose to 1500 BID and then 1500+2000. Started 2000mg BID on 10/28/20. Increased to [id number] since around Christmas 2020.   - Saw radiation oncology at [facility], radiation oncology at [facility], as well as endocrine oncology (Dr. Huertas) at [facility].   - S/p adjuvant RT by Dr. Monsivais - 5040 cGy over 30 fr (8/24/18-10/6/18).  - 2/11/19: OSH CT C/A/P and MRI brain with contrast - no evidence of disease recurrence.   - 06/08/20, 09/14/20, 12/7/20, 3/16/2021: CT C/A/P with contrast - AFUA.    INTERVAL HISTORY:  Ms. Rodríguez is a 37 year old woman with history notable for right-sided functioning adrenocortical carcinoma (diagnosed in May 2018), who was  on adjuvant mitotane until May 2021.     She is being followed over a video visit.    She has been off mitotane now for over a year.  She is doing well overall.      She had a visit with Dr. Jaquez and she held her cortisone and levothyroxine after consultation in endocrinology. She noticed sore throat and headaches for 2 weeks. She initial felt this was a viral illness but then it persisted. She restarted her cortisone and it helped. She has stopped it again last Saturday. She is feeling fine for now.     She does not have any other new complaints at this visit.       She is being seen for a scheduled follow-up visit.    REVIEW OF SYSTEMS: 14 point ROS negative other than the symptoms noted above in the HPI.    PAST MEDICAL HISTORY:  Past Medical History:   Diagnosis Date     Adrenal cortex cancer, right (H)  "6/30/2018    Resected 6/25/18, Dr. Mansfield.     Adrenal mass (H)      Anemia     thought due to heavy menses.     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  Check in May. SCP started.  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     SHREYA on CPAP 12/17/2015    Stopped using CPAP the Fall of 2016 after weight loss as even at lower pressure/adjustment couldn't tolerate the cpap. Resting well, cautioned to watch for any signs of fatigue and consider \"titration study\" in the future to see if cpap is still required at her new weight.     Pneumonia     hx of recurrent pneumonia issues/respiratory infections.     Polycystic ovary syndrome     able to get pregnant, not on meds.     Pulmonary embolism (H) 2009 or so    briefly on wafarin.     Sleep apnea     cpap     Vitamin D deficiency    MHTFR mutation with h/o mutiple miscarriages.    PAST SURGICAL HISTORY:   Past Surgical History:   Procedure Laterality Date     ADRENALECTOMY Right 6/25/2018    Procedure: ADRENALECTOMY;  Right Adrenalectomy,  Embolize Liver , Anesthesia Block;  Surgeon: Warren Mansfield MD;  Location: [facility] OR     ADRENALECTOMY       ADRENALECTOMY Right      "  SECTION      twice      SECTION      2      SECTION      x2     EMBOLECTOMY ABDOMEN N/A 2018    Procedure: EMBOLECTOMY ABDOMEN;;  Surgeon: Ector Mcintyre MD;  Location: [facility] OR     EXTRACORPOREAL SHOCK WAVE LITHOTRIPSY (ESWL)       GASTRIC BYPASS       NE CYSTO/URETERO W/LITHOTRIPSY &INDWELL STENT INSRT Left 2018    Procedure: CYSTOSCOPY, LEFT URETEROSCOPY LASER LITHOTRIPSY STENT INSERTION;  Surgeon: Skyler Delvalle MD;  Location: [facility];  Service: Urology     NE LAP GASTRIC BYPASS/DERICK-EN-Y N/A 2016    Procedure: GASTRIC BYPASS LAPAROSCOPIC DERICK-EN-Y;  Surgeon: Randy Sutherland MD;  Location: [facility];  Service: General     TUBAL LIGATION       WISDOM TOOTH EXTRACTION Bilateral       SOCIAL HISTORY:   Social History     Tobacco Use     Smoking status: Former     Smokeless tobacco: Never     Tobacco comments:     vapes marijuana   Vaping Use     Vaping Use: Never used   Substance Use Topics     Alcohol use: Yes     Comment: occ.     Drug use: No     FAMILY HISTORY:   Family History   Problem Relation Age of Onset     Depression Paternal Grandmother      Diabetes Mother      Diabetes Father      Hypertension Father      Chronic Obstructive Pulmonary Disease Father      Cancer Maternal Grandmother         gastric     Urolithiasis Maternal Grandmother      Heart Disease Maternal Grandfather      Cancer Maternal Grandfather         lung     Heart Disease Paternal Grandfather      Breast Cancer No family hx of      Colon Cancer No family hx of      Prostate Cancer No family hx of      Cerebrovascular Disease No family hx of      Clotting Disorder No family hx of      Gout No family hx of      ALLERGIES:   Allergies   Allergen Reactions     Bee Venom Swelling     Ibuprofen Hives and Swelling     CURRENT MEDICATIONS:   Current Outpatient Medications   Medication Instructions     acetaminophen (TYLENOL) 650 mg, Oral, EVERY 4 HOURS PRN     buPROPion  (WELLBUTRIN SR) 150 mg, Oral     calcitRIOL (ROCALTROL) 0.5 MCG capsule No dose, route, or frequency recorded.     calcium carbonate 500 mg (elemental) (OSCAL) 500 mg, Oral, 2 TIMES DAILY     clonazePAM (KLONOPIN) 0.5 mg, Oral, 2 TIMES DAILY PRN     diazepam (VALIUM) 5 mg, Oral, ONCE PRN     fludrocortisone (FLORINEF) 0.2 mg, Oral, DAILY     FLUoxetine (PROZAC) 60 mg, Oral, DAILY     gabapentin (NEURONTIN) 900 mg, Oral, 3 TIMES DAILY     hydrocortisone (CORTEF) 10 MG tablet Take 3 tablets (30 MG) every morning and take 2 tablets (20 MG) by mouth every day at 2 PM. Additional as directed.     levothyroxine (SYNTHROID/LEVOTHROID) 125 mcg, Oral, DAILY     ondansetron (ZOFRAN) 8 mg, Oral, EVERY 8 HOURS PRN     prochlorperazine (COMPAZINE) 5 mg, Oral, EVERY 8 HOURS PRN     temazepam (RESTORIL) 15 mg, Oral, AT BEDTIME PRN     UNABLE TO FIND Other, medical cannabis (Patient's own supply. Not a prescription)       PHYSICAL EXAMINATION:  Vital signs: There were no vitals taken for this visit.   Gen: NAD, on video no distress  No other exam  Recent Labs   Lab Test 05/12/22  1017 03/24/22  1102 03/24/22  0728 02/08/22  0903 01/18/22  1607    138 140 139 138   POTASSIUM 4.4 3.6 3.9 3.8 4.4   CHLORIDE 107 102 107 104 107   CO2 27 29 30 27 27   ANIONGAP 5 7 3 8 4   BUN 6* 12 12 10 9   CR 0.72 0.80 0.78 0.71 0.74   GLC 86 70 88 88 87   SHASHA 9.1 8.8 9.1 8.6 8.5     No results for input(s): MAG, PHOS in the last 92538 hours.  Recent Labs   Lab Test 05/12/22  1017 03/24/22  0822 02/08/22  0903 01/18/22  1607 12/31/21  1348 10/25/21  0919   WBC 4.0 4.3 6.0 6.3 5.2 4.2   HGB 12.7 13.8 13.6 13.8 13.8 14.1    215 185 252 166 201   MCV 89 92 92 91 95 94   NEUTROPHIL 65 54 72  --  79 68     Recent Labs   Lab Test 05/12/22  1017 03/24/22  1102 02/08/22  0903   BILITOTAL 0.2 0.3 0.2   ALKPHOS 138 134 118   ALT 29 33 32   AST 21 22 25   ALBUMIN 3.5 3.6 3.4     TSH   Date Value Ref Range Status   10/25/2022 3.14 0.30 - 4.20 uIU/mL  Final   09/29/2022 1.82 0.30 - 4.20 uIU/mL Final   05/12/2022 0.16 (L) 0.40 - 4.00 mU/L Final   03/24/2022 0.08 (L) 0.40 - 4.00 mU/L Final   02/08/2022 0.29 (L) 0.40 - 4.00 mU/L Final   07/05/2021 0.22 (L) 0.40 - 4.00 mU/L Final   06/04/2021 0.44 0.40 - 4.00 mU/L Final   05/10/2021 0.18 (L) 0.40 - 4.00 mU/L Final     No results for input(s): CEA in the last 30909 hours.  Results for orders placed or performed in visit on 05/16/22   CT Chest/Abdomen/Pelvis w Contrast    Narrative    EXAM: CT CHEST/ABDOMEN/PELVIS W CONTRAST  LOCATION: [facility]  DATE/TIME: 5/16/2022 2:45 PM    INDICATION: Adrenocortical carcinoma surveillance  COMPARISON: Multiple priors with the most recent dated 02/08/2022  TECHNIQUE: CT scan of the chest, abdomen, and pelvis was performed following injection of IV contrast. Multiplanar reformats were obtained. Dose reduction techniques were used.   CONTRAST: Isovue 370 95cc    FINDINGS:   LUNGS AND PLEURA: Patent central airways. No new noncalcified pulmonary nodule. Unchanged minimal thickening along the left major fissure, which likely reflects a benign intrapulmonary lymph node. Unchanged scarring in the right lower lobe. No new   pleural effusion. No pneumothorax.    MEDIASTINUM/AXILLAE: No intrathoracic lymphadenopathy. No incidentally noted central pulmonary embolus. Normal caliber thoracic aorta. Normal heart size. No pancreatic or effusion. Normal esophagus.    CORONARY ARTERY CALCIFICATION: None.    HEPATOBILIARY: Benign inferior right hepatic hemangioma. No new focal hepatic mass. No biliary dilatation. No calcified gallstones.    PANCREAS: Homogeneous enhancement. No focal pancreatic mass. No peripancreatic inflammation. No pancreatic ductal dilatation.    SPLEEN: Normal in size.    ADRENAL GLANDS: Prior right adrenalectomy. No new soft tissue nodule in the surgical bed. Unremarkable left adrenal gland.    KIDNEYS/BLADDER: Stable focal  scarring and hypoenhancement in the superior right renal pole. No new suspicious renal mass. No collecting system dilatation. No urinary bladder wall thickening.    BOWEL: Prior gastric bypass. Conspicuous polypoid tissue in the proximal duodenum measuring 1.2 x 1.1 cm, which previously measured 1.2 x 1.0 cm (series 3, image 308). No dilated loops of small bowel to suggest an obstruction. Normal caliber appendix.   Unremarkable appearance of the colon.    LYMPH NODES: No new abdominopelvic lymphadenopathy.    VASCULATURE: Normal caliber abdominal aorta. Patent portal, splenic, and superior mesenteric veins. Patent bilateral renal veins and IVC.    PELVIC ORGANS: Left adnexal cyst measuring 2.8 cm, previously 3.1 cm. Indwelling IUD.    MUSCULOSKELETAL: Tiny fat filled ventral abdominal wall hernia. No aggressive osseous lesion.      Impression    IMPRESSION:  1.  No definite findings to suggest metastatic disease in the chest, abdomen, or pelvis.  2.  Relatively stable polypoid tissue in the proximal duodenum. Continued attention on the follow-up is recommended.  3.  Decreased size of the left adnexal cyst, likely benign in a premenopausal female.     *Note: Due to a large number of results and/or encounters for the requested time period, some results have not been displayed. A complete set of results can be found in Results Review.            ASSESSMENT AND PLAN: A 37 year old  female with right-sided functioning high-risk adrenocortical carcinoma.     Adrenocortical carcinoma:  - Started on adjuvant mitotane in July 2018 based on her presentation and tumor characteristics including invasion of the adrenal capsule, high mitotic rate, possibly positive margins, and high Ki67, which could result in a rate of recurrence as high as 40%.    The mitotane was stopped by Dr. iLang and endocrinologic oncologist from [facility] in May 2021.       I have reviewed all of the labs done prior to this clinic visit.   Labs are all completely normal including electrolytes, renal function, hepatic panel, complete blood count and differential except for borderline elevation in her alkaline phosphatase. Her alkaline phosphatase isoenzymes were also checked. It suggests that this elevation is coming from the bone. I do not have a good explanation for the cause or pathophysiology for this.     I have reviewed actual images from her restaging scans. It remains stable. There is nothing to suggest recurrent adrenocortical carcinoma. She has a polypoid growth in her 4th part of duodenum. It is a hard area to reach with endoscopy. I will check with Dr. Gomes if this can be followed by endoscopy. I will also seek input from him if this needs to be pursued any further.     She is being followed by Dr. Jaquez in endocrinology. She held her cortisone and levothyroxine after consultation in endocrinology. She noticed sore throat and headaches for 2 weeks. She initial felt this was a viral illness but then it persisted. She restarted her cortisone and it helped. She has stopped it again last Saturday. She is feeling fine for now. She would get her 24 hr urine for cortisol in a week or so.     She does not have questions or concerns at this visit. I will see her in 4 months with labs and restaging scans.         Video-Visit Details    Type of service:  Video Visit  Originating Location (pt. Location): Home  Distant Location (provider location):  [facility] [place]   Platform used for Video Visit: FaceTags    25 minutes spent on the date of the encounter doing chart review, history and exam, documentation and further activities as noted above      Tru Hernadez    Hematologist and Medical Oncologist  [facility]

## 2022-11-01 NOTE — PROGRESS NOTES
Suzy is a 37 year old who is being evaluated via a billable video visit.      How would you like to obtain your AVS? MyChart  If the video visit is dropped, the invitation should be resent by: Text to cell phone: [phone]  Will anyone else be joining your video visit? Mali Davila

## 2022-11-01 NOTE — LETTER
"    11/1/2022         RE: Suzy Rodríguez  [address]  [facility] [address]        Dear Colleague,    Thank you for referring your patient, Suzy Rodríguez, to the [facility]. Please see a copy of my visit note below.    Suzy is a 37 year old who is being evaluated via a billable video visit.      How would you like to obtain your AVS? MyChart  If the video visit is dropped, the invitation should be resent by: Text to cell phone: [phone]  Will anyone else be joining your video visit? No      Janet BaezLexington VA Medical Cent[facility]        [facility] Followup  Nov 1, 2022     REFERRING PROVIDER: Warren Mansfield MD from [facility] clinic.      REASON FOR CURRENT VISIT: Follow-up for adrenocortical carcinoma,    ONCOLOGIC HISTORY:  1. 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   - CT abdomen and pelvis stone protocol without contrast 5 5555 showed \"9.2 x 8 cm heterogeneous right upper quadrant mass with small foci of calcification within it which is likely arising from the adrenal gland though inseparable from liver and upper pole of right kidney. It is incompletely evaluated on this noncontrast study. 3 x 2.6 cm mass right lobe of liver on image #85 also incompletely evaluated. 6 x 4 x 4 mm upper third left ureteral obstructing stone with mild to moderate secondary hydronephrosis. Collection of stones at lower pole left kidney extending over an area of approximately 6 x 7 x 4 mm. Additional nonobstructing 1 mm stone upper pole left kidney. 2 mm nonobstructing stone noted upper pole right kidney with no hydronephrosis on the right. Postop change consistent with gastric bypass. Noncontrast images of spleen, left adrenal gland, gallbladder, and pancreas unremarkable. No aneurysm. No " "adenopathy.\"  - Seen by Dr. Delvalle at [facility] clinic. Referred to Dr. Alvino Patel and then Dr. Warren Mansfield.  - Endocrinology team directed workup showed high DHEAS level of 740 pre-surgery.   - Right open adrenalectomy and hepatic mobilization performed by Dr. Warren Mansfield on 6/25/2018. Pathology showed adrenocortical carcinoma measuring 11.3 cm, weighing 413 g with extension into or through the adrenal capsule negative lymphovascular invasion, tumor necrosis present, margins involved by tumor, no regional lymph nodes identified, pathologic stage T2 NX.  pathology review reveals low grade ACC.  - DHEAS normalized postsurgery.   - Adjuvant Mitotane started on 7/24/18. Dose increased to 2000mg TID around 10/23/18 due to persistently low troughs. Held 1/16/19 for two weeks and resumed 1/30 at 1000 mg po BID due to very poor tolerance of higher doses. Highest mitotane level was 10.2 on 2/11/19. Mitotane troughs did not rise above 10 despite increase in dose to 1500 BID and then 1500+2000. Started 2000mg BID on 10/28/20. Increased to [id number] since around Christmas 2020.   - Saw radiation oncology at [facility], radiation oncology at [facility], as well as endocrine oncology (Dr. Huertas) at [facility].   - S/p adjuvant RT by Dr. Monsivais - 5040 cGy over 30 fr (8/24/18-10/6/18).  - 2/11/19: OSH CT C/A/P and MRI brain with contrast - no evidence of disease recurrence.   - 06/08/20, 09/14/20, 12/7/20, 3/16/2021: CT C/A/P with contrast - AFUA.    INTERVAL HISTORY:  Ms. Rodríguez is a 37 year old woman with history notable for right-sided functioning adrenocortical carcinoma (diagnosed in May 2018), who was  on adjuvant mitotane until May 2021.     She is being followed over a video visit.    She has been off mitotane now for over a year.  She is doing well overall.      She had a visit with Dr. Jaquez and she held her cortisone and levothyroxine after " "consultation in endocrinology. She noticed sore throat and headaches for 2 weeks. She initial felt this was a viral illness but then it persisted. She restarted her cortisone and it helped. She has stopped it again last Saturday. She is feeling fine for now.     She does not have any other new complaints at this visit.       She is being seen for a scheduled follow-up visit.    REVIEW OF SYSTEMS: 14 point ROS negative other than the symptoms noted above in the HPI.    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 Mansfield.     Adrenal mass (H)      Anemia     thought due to heavy menses.     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  Check in May. SCP started.  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     SHREYA on CPAP 12/17/2015    Stopped using CPAP the Fall of 2016 after weight loss as even at lower pressure/adjustment couldn't tolerate the cpap. Resting well, cautioned to watch for any signs of fatigue and consider \"titration study\" in the future to see if cpap is still required at her new weight.     Pneumonia     hx of recurrent pneumonia " issues/respiratory infections.     Polycystic ovary syndrome     able to get pregnant, not on meds.     Pulmonary embolism (H)  or so    briefly on wafarin.     Sleep apnea     cpap     Vitamin D deficiency    MHTFR mutation with h/o mutiple miscarriages.    PAST SURGICAL HISTORY:   Past Surgical History:   Procedure Laterality Date     ADRENALECTOMY Right 2018    Procedure: ADRENALECTOMY;  Right Adrenalectomy,  Embolize Liver , Anesthesia Block;  Surgeon: Warren Mansfield MD;  Location: [facility] OR     ADRENALECTOMY       ADRENALECTOMY Right       SECTION      twice      SECTION      2      SECTION      x2     EMBOLECTOMY ABDOMEN N/A 2018    Procedure: EMBOLECTOMY ABDOMEN;;  Surgeon: Ector Mcintyre MD;  Location: [facility] OR     EXTRACORPOREAL SHOCK WAVE LITHOTRIPSY (ESWL)       GASTRIC BYPASS       MA CYSTO/URETERO W/LITHOTRIPSY &INDWELL STENT INSRT Left 2018    Procedure: CYSTOSCOPY, LEFT URETEROSCOPY LASER LITHOTRIPSY STENT INSERTION;  Surgeon: Skyler Delvalle MD;  Location: [facility] OR;  Service: Urology     MA LAP GASTRIC BYPASS/DERICK-EN-Y N/A 2016    Procedure: GASTRIC BYPASS LAPAROSCOPIC DERICK-EN-Y;  Surgeon: Randy Sutherland MD;  Location: [facility] OR;  Service: General     TUBAL LIGATION       WISDOM TOOTH EXTRACTION Bilateral       SOCIAL HISTORY:   Social History     Tobacco Use     Smoking status: Former     Smokeless tobacco: Never     Tobacco comments:     vapes marijuana   Vaping Use     Vaping Use: Never used   Substance Use Topics     Alcohol use: Yes     Comment: occ.     Drug use: No     FAMILY HISTORY:   Family History   Problem Relation Age of Onset     Depression Paternal Grandmother      Diabetes Mother      Diabetes Father      Hypertension Father      Chronic Obstructive Pulmonary Disease Father      Cancer Maternal Grandmother         gastric     Urolithiasis Maternal Grandmother      Heart Disease Maternal Grandfather       Cancer Maternal Grandfather         lung     Heart Disease Paternal Grandfather      Breast Cancer No family hx of      Colon Cancer No family hx of      Prostate Cancer No family hx of      Cerebrovascular Disease No family hx of      Clotting Disorder No family hx of      Gout No family hx of      ALLERGIES:   Allergies   Allergen Reactions     Bee Venom Swelling     Ibuprofen Hives and Swelling     CURRENT MEDICATIONS:   Current Outpatient Medications   Medication Instructions     acetaminophen (TYLENOL) 650 mg, Oral, EVERY 4 HOURS PRN     buPROPion (WELLBUTRIN SR) 150 mg, Oral     calcitRIOL (ROCALTROL) 0.5 MCG capsule No dose, route, or frequency recorded.     calcium carbonate 500 mg (elemental) (OSCAL) 500 mg, Oral, 2 TIMES DAILY     clonazePAM (KLONOPIN) 0.5 mg, Oral, 2 TIMES DAILY PRN     diazepam (VALIUM) 5 mg, Oral, ONCE PRN     fludrocortisone (FLORINEF) 0.2 mg, Oral, DAILY     FLUoxetine (PROZAC) 60 mg, Oral, DAILY     gabapentin (NEURONTIN) 900 mg, Oral, 3 TIMES DAILY     hydrocortisone (CORTEF) 10 MG tablet Take 3 tablets (30 MG) every morning and take 2 tablets (20 MG) by mouth every day at 2 PM. Additional as directed.     levothyroxine (SYNTHROID/LEVOTHROID) 125 mcg, Oral, DAILY     ondansetron (ZOFRAN) 8 mg, Oral, EVERY 8 HOURS PRN     prochlorperazine (COMPAZINE) 5 mg, Oral, EVERY 8 HOURS PRN     temazepam (RESTORIL) 15 mg, Oral, AT BEDTIME PRN     UNABLE TO FIND Other, medical cannabis (Patient's own supply. Not a prescription)       PHYSICAL EXAMINATION:  Vital signs: There were no vitals taken for this visit.   Gen: NAD, on video no distress  No other exam  Recent Labs   Lab Test 05/12/22  1017 03/24/22  1102 03/24/22  0728 02/08/22  0903 01/18/22  1607    138 140 139 138   POTASSIUM 4.4 3.6 3.9 3.8 4.4   CHLORIDE 107 102 107 104 107   CO2 27 29 30 27 27   ANIONGAP 5 7 3 8 4   BUN 6* 12 12 10 9   CR 0.72 0.80 0.78 0.71 0.74   GLC 86 70 88 88 87   SHASHA 9.1 8.8 9.1 8.6 8.5     No  results for input(s): MAG, PHOS in the last 62643 hours.  Recent Labs   Lab Test 05/12/22  1017 03/24/22  0822 02/08/22  0903 01/18/22  1607 12/31/21  1348 10/25/21  0919   WBC 4.0 4.3 6.0 6.3 5.2 4.2   HGB 12.7 13.8 13.6 13.8 13.8 14.1    215 185 252 166 201   MCV 89 92 92 91 95 94   NEUTROPHIL 65 54 72  --  79 68     Recent Labs   Lab Test 05/12/22  1017 03/24/22  1102 02/08/22  0903   BILITOTAL 0.2 0.3 0.2   ALKPHOS 138 134 118   ALT 29 33 32   AST 21 22 25   ALBUMIN 3.5 3.6 3.4     TSH   Date Value Ref Range Status   10/25/2022 3.14 0.30 - 4.20 uIU/mL Final   09/29/2022 1.82 0.30 - 4.20 uIU/mL Final   05/12/2022 0.16 (L) 0.40 - 4.00 mU/L Final   03/24/2022 0.08 (L) 0.40 - 4.00 mU/L Final   02/08/2022 0.29 (L) 0.40 - 4.00 mU/L Final   07/05/2021 0.22 (L) 0.40 - 4.00 mU/L Final   06/04/2021 0.44 0.40 - 4.00 mU/L Final   05/10/2021 0.18 (L) 0.40 - 4.00 mU/L Final     No results for input(s): CEA in the last 70310 hours.  Results for orders placed or performed in visit on 05/16/22   CT Chest/Abdomen/Pelvis w Contrast    Narrative    EXAM: CT CHEST/ABDOMEN/PELVIS W CONTRAST  LOCATION: [facility]  DATE/TIME: 5/16/2022 2:45 PM    INDICATION: Adrenocortical carcinoma surveillance  COMPARISON: Multiple priors with the most recent dated 02/08/2022  TECHNIQUE: CT scan of the chest, abdomen, and pelvis was performed following injection of IV contrast. Multiplanar reformats were obtained. Dose reduction techniques were used.   CONTRAST: Isovue 370 95cc    FINDINGS:   LUNGS AND PLEURA: Patent central airways. No new noncalcified pulmonary nodule. Unchanged minimal thickening along the left major fissure, which likely reflects a benign intrapulmonary lymph node. Unchanged scarring in the right lower lobe. No new   pleural effusion. No pneumothorax.    MEDIASTINUM/AXILLAE: No intrathoracic lymphadenopathy. No incidentally noted central pulmonary embolus. Normal caliber  thoracic aorta. Normal heart size. No pancreatic or effusion. Normal esophagus.    CORONARY ARTERY CALCIFICATION: None.    HEPATOBILIARY: Benign inferior right hepatic hemangioma. No new focal hepatic mass. No biliary dilatation. No calcified gallstones.    PANCREAS: Homogeneous enhancement. No focal pancreatic mass. No peripancreatic inflammation. No pancreatic ductal dilatation.    SPLEEN: Normal in size.    ADRENAL GLANDS: Prior right adrenalectomy. No new soft tissue nodule in the surgical bed. Unremarkable left adrenal gland.    KIDNEYS/BLADDER: Stable focal scarring and hypoenhancement in the superior right renal pole. No new suspicious renal mass. No collecting system dilatation. No urinary bladder wall thickening.    BOWEL: Prior gastric bypass. Conspicuous polypoid tissue in the proximal duodenum measuring 1.2 x 1.1 cm, which previously measured 1.2 x 1.0 cm (series 3, image 308). No dilated loops of small bowel to suggest an obstruction. Normal caliber appendix.   Unremarkable appearance of the colon.    LYMPH NODES: No new abdominopelvic lymphadenopathy.    VASCULATURE: Normal caliber abdominal aorta. Patent portal, splenic, and superior mesenteric veins. Patent bilateral renal veins and IVC.    PELVIC ORGANS: Left adnexal cyst measuring 2.8 cm, previously 3.1 cm. Indwelling IUD.    MUSCULOSKELETAL: Tiny fat filled ventral abdominal wall hernia. No aggressive osseous lesion.      Impression    IMPRESSION:  1.  No definite findings to suggest metastatic disease in the chest, abdomen, or pelvis.  2.  Relatively stable polypoid tissue in the proximal duodenum. Continued attention on the follow-up is recommended.  3.  Decreased size of the left adnexal cyst, likely benign in a premenopausal female.     *Note: Due to a large number of results and/or encounters for the requested time period, some results have not been displayed. A complete set of results can be found in Results Review.            ASSESSMENT AND  PLAN: A 37 year old  female with right-sided functioning high-risk adrenocortical carcinoma.     Adrenocortical carcinoma:  - Started on adjuvant mitotane in July 2018 based on her presentation and tumor characteristics including invasion of the adrenal capsule, high mitotic rate, possibly positive margins, and high Ki67, which could result in a rate of recurrence as high as 40%.    The mitotane was stopped by Dr. Liang and endocrinologic oncologist from [facility] in May 2021.       I have reviewed all of the labs done prior to this clinic visit.  Labs are all completely normal including electrolytes, renal function, hepatic panel, complete blood count and differential except for borderline elevation in her alkaline phosphatase. Her alkaline phosphatase isoenzymes were also checked. It suggests that this elevation is coming from the bone. I do not have a good explanation for the cause or pathophysiology for this.     I have reviewed actual images from her restaging scans. It remains stable. There is nothing to suggest recurrent adrenocortical carcinoma. She has a polypoid growth in her 4th part of duodenum. It is a hard area to reach with endoscopy. I will check with Dr. Gomes if this can be followed by endoscopy. I will also seek input from him if this needs to be pursued any further.     She is being followed by Dr. Jaquez in endocrinology. She held her cortisone and levothyroxine after consultation in endocrinology. She noticed sore throat and headaches for 2 weeks. She initial felt this was a viral illness but then it persisted. She restarted her cortisone and it helped. She has stopped it again last Saturday. She is feeling fine for now. She would get her 24 hr urine for cortisol in a week or so.     She does not have questions or concerns at this visit. I will see her in 4 months with labs and restaging scans.     25 minutes spent on the date of the encounter doing chart review, history and  exam, documentation and further activities as noted above          Again, thank you for allowing me to participate in the care of your patient.      Sincerely,    Tru Hernadez MD

## 2022-11-01 NOTE — NURSING NOTE
Patient declined individual medication review by support staff because patient states nothing has changed.     Please review distress screening.     Janet Davila

## 2022-11-03 ENCOUNTER — PREP FOR PROCEDURE (OUTPATIENT)
Dept: GASTROENTEROLOGY | Facility: CLINIC | Age: 37
End: 2022-11-03

## 2022-11-03 ENCOUNTER — PATIENT OUTREACH (OUTPATIENT)
Dept: GASTROENTEROLOGY | Facility: CLINIC | Age: 37
End: 2022-11-03

## 2022-11-03 DIAGNOSIS — R93.3 ABNORMAL FINDING ON GI TRACT IMAGING: Primary | ICD-10-CM

## 2022-11-03 NOTE — TELEPHONE ENCOUNTER
Pt referred by Dr Hernadez to Dr. Gomes related to duodenal lesion  Per Dr. Gomes:  push enteroscopy with possible catheter EUS and sampling, [place], at this point December will work     Please assist in scheduling:     Procedure/Imaging/Clinic: enteroscopy/EUS with FNA  Physician: Dr. Gomes  Timin/13  Procedure length:provider average  Anesthesia:gen  Dx: duodenal lesion  Tier:2  Location: [facility]       Called to discuss with patient.     Explained they can expect a call from  for date and time of procedure, will need a , someone to stay with them for 24 hours and should stay in town for 24 hours (within 45 min of Hospital) post procedure    Patient needs to get pre-op physical completed. If outside  health system will need physical faxed to number [phone]   If you do not get a preop physical, your procedure could be cancelled, patient voiced understanding*    Preop Plan: PCP will do, PCP verified Ivette Leal- pt has blood clotting disorder, has had to take blood thinners before- asked her to talk to PCP about this     Med Review    Blood thinner -  no  ASA - no  Diabetic - no    COVID test discussed:will do home test    Patient Education r/t procedure:done    Does patient have any history of gastric bypass/gastric surgery/altered panc/bili anatomy?no    A pre-op nurse will call 1-2 days prior to the procedure.    NPO/Pre    Other specific details/comments:enteroscopy prep discussed - mychart sent     Verbalized understanding of all instructions. All questions answered.     Procedure order placed, message routed to OR / Endo

## 2022-11-06 PROCEDURE — 99000 SPECIMEN HANDLING OFFICE-LAB: CPT | Performed by: PATHOLOGY

## 2022-11-06 PROCEDURE — 82542 COL CHROMOTOGRAPHY QUAL/QUAN: CPT | Mod: 90 | Performed by: PATHOLOGY

## 2022-11-06 PROCEDURE — 82530 CORTISOL FREE: CPT | Mod: 90 | Performed by: PATHOLOGY

## 2022-11-07 ENCOUNTER — APPOINTMENT (OUTPATIENT)
Dept: LAB | Facility: CLINIC | Age: 37
End: 2022-11-07
Payer: COMMERCIAL

## 2022-11-07 LAB
BASOPHILS # BLD AUTO: 0 10E3/UL (ref 0–0.2)
BASOPHILS NFR BLD AUTO: 1 %
CORTIS SERPL-MCNC: 8.2 UG/DL
EOSINOPHIL # BLD AUTO: 0.2 10E3/UL (ref 0–0.7)
EOSINOPHIL NFR BLD AUTO: 3 %
ERYTHROCYTE [DISTWIDTH] IN BLOOD BY AUTOMATED COUNT: 13.6 % (ref 10–15)
HCT VFR BLD AUTO: 39.4 % (ref 35–47)
HGB BLD-MCNC: 12.4 G/DL (ref 11.7–15.7)
LYMPHOCYTES # BLD AUTO: 1.4 10E3/UL (ref 0.8–5.3)
LYMPHOCYTES NFR BLD AUTO: 29 %
MCH RBC QN AUTO: 26.6 PG (ref 26.5–33)
MCHC RBC AUTO-ENTMCNC: 31.5 G/DL (ref 31.5–36.5)
MCV RBC AUTO: 85 FL (ref 78–100)
MONOCYTES # BLD AUTO: 0.4 10E3/UL (ref 0–1.3)
MONOCYTES NFR BLD AUTO: 8 %
NEUTROPHILS # BLD AUTO: 3 10E3/UL (ref 1.6–8.3)
NEUTROPHILS NFR BLD AUTO: 60 %
PLATELET # BLD AUTO: 257 10E3/UL (ref 150–450)
PTH-INTACT SERPL-MCNC: 54 PG/ML (ref 15–65)
RBC # BLD AUTO: 4.66 10E6/UL (ref 3.8–5.2)
WBC # BLD AUTO: 5 10E3/UL (ref 4–11)

## 2022-11-07 PROCEDURE — 82533 TOTAL CORTISOL: CPT | Mod: 90 | Performed by: PATHOLOGY

## 2022-11-07 PROCEDURE — 36415 COLL VENOUS BLD VENIPUNCTURE: CPT | Performed by: PATHOLOGY

## 2022-11-07 PROCEDURE — 82306 VITAMIN D 25 HYDROXY: CPT | Mod: 90 | Performed by: PATHOLOGY

## 2022-11-07 PROCEDURE — 82570 ASSAY OF URINE CREATININE: CPT | Mod: 90 | Performed by: PATHOLOGY

## 2022-11-07 PROCEDURE — 85025 COMPLETE CBC W/AUTO DIFF WBC: CPT | Performed by: PATHOLOGY

## 2022-11-07 PROCEDURE — 82627 DEHYDROEPIANDROSTERONE: CPT | Mod: 90 | Performed by: PATHOLOGY

## 2022-11-07 PROCEDURE — 81050 URINALYSIS VOLUME MEASURE: CPT | Mod: 90 | Performed by: PATHOLOGY

## 2022-11-07 PROCEDURE — 82088 ASSAY OF ALDOSTERONE: CPT | Mod: 90 | Performed by: PATHOLOGY

## 2022-11-07 PROCEDURE — 82530 CORTISOL FREE: CPT | Mod: 90 | Performed by: PATHOLOGY

## 2022-11-07 PROCEDURE — 83970 ASSAY OF PARATHORMONE: CPT | Mod: 90 | Performed by: PATHOLOGY

## 2022-11-07 PROCEDURE — 82024 ASSAY OF ACTH: CPT | Mod: 90 | Performed by: PATHOLOGY

## 2022-11-08 ENCOUNTER — TELEPHONE (OUTPATIENT)
Dept: GASTROENTEROLOGY | Facility: CLINIC | Age: 37
End: 2022-11-08

## 2022-11-08 LAB
ACTH PLAS-MCNC: 20 PG/ML
ALDOST SERPL-MCNC: 12 NG/DL (ref 0–31)
COLLECT DURATION TIME UR: 24 H
CREAT 24H UR-MRATE: 1.18 G/(24.H) (ref 0.72–1.51)
CREAT UR-MCNC: 49.6 MG/DL
DHEA-S SERPL-MCNC: <15 UG/DL (ref 35–430)
SPECIMEN VOL UR: 2375 ML

## 2022-11-08 NOTE — TELEPHONE ENCOUNTER
Spoke to patient in regards to message received about rescheduling her procedure. Patient stated she has a final on 12/13 and needs to push her procedure out. I offered 12/21 and 12/28 as next available reschedule dates but patient does not want it to be too close to the holidays. She said she is going to check with her professor to see if she can take her final exam earlier and get back to us on if she wants to reschedule or not.

## 2022-11-10 LAB
ANNOTATION COMMENT IMP: NORMAL
COLLECT DURATION TIME UR: 24 H
CORTIS 24H UR-MRATE: 15 MCG/24 H (ref 3.5–45)
CORTIS F 24H UR HPLC-MCNC: 7.92 UG/L
CORTIS F 24H UR-MRATE: 18.8 UG/D
CORTIS F/CREAT 24H UR: 14.4 UG/G CRT
CORTISONE 24H UR-MRATE: 86 MCG/24 H (ref 17–129)
CREAT 24H UR-MRATE: 1306 MG/D
CREAT UR-MCNC: 55 MG/DL
SPECIMEN VOL 24H UR: 2375 ML

## 2022-11-11 LAB
DEPRECATED CALCIDIOL+CALCIFEROL SERPL-MC: <42 UG/L (ref 20–75)
VITAMIN D2 SERPL-MCNC: <5 UG/L
VITAMIN D3 SERPL-MCNC: 37 UG/L

## 2022-11-11 NOTE — RESULT ENCOUNTER NOTE
Lalo Almonte! It looks like this enzyme is coming from the bone. I try to add to your lab work a vitamin D and a parathyroid hormone level, to try to understand whether or not this is a hormone issue.  Unfortunately, the lab was not able to add these to your specimen.  The orders are in.  Please try to have blood work done.  Also, I am still waiting for the 24-hour urinary cortisol.
The urinary cortisol level is normal.
No

## 2022-11-11 NOTE — RESULT ENCOUNTER NOTE
Pt called. Labs did not reveal an endocrine etiology for the elevated ALP. The patient denies taking high dosages of biotin.

## 2022-11-13 ENCOUNTER — MYC REFILL (OUTPATIENT)
Dept: FAMILY MEDICINE | Facility: CLINIC | Age: 37
End: 2022-11-13

## 2022-11-13 DIAGNOSIS — C74.01 MALIGNANT NEOPLASM OF CORTEX OF RIGHT ADRENAL GLAND (H): ICD-10-CM

## 2022-11-13 DIAGNOSIS — F51.01 PRIMARY INSOMNIA: ICD-10-CM

## 2022-11-14 RX ORDER — TEMAZEPAM 15 MG/1
15-30 CAPSULE ORAL
Qty: 60 CAPSULE | Refills: 0 | Status: SHIPPED | OUTPATIENT
Start: 2022-11-14 | End: 2022-12-20

## 2022-11-14 NOTE — TELEPHONE ENCOUNTER
Pending Prescriptions:                       Disp   Refills    temazepam (RESTORIL) 15 MG capsule        60 cap*0            Sig: Take 1-2 capsules (15-30 mg) by mouth nightly as           needed for sleep      Last Written Prescription Date: 10/17/22  Last Fill Quantity: 60,   # refills: 0  Last Office Visit: 11/1/22  Future Office visit:    Next 5 appointments (look out 90 days)    Nov 21, 2022  3:00 PM  (Arrive by 2:40 PM)  Pre-Operative Physical with Ivette Leal MD  [facility] [address] [facility] [address] [phone]           Routing refill request to provider.     Mahendra Gibson, RN, BSN.  RN Care Coordinator     [facility]   [phone]

## 2022-11-16 NOTE — PROGRESS NOTES
Chief Complaint   Patient presents with     Blood Draw     Vitals and blood drawn via VPT by LPN. Pt checked into appt.      KANDICE Meza LPN   ,

## 2022-11-21 ENCOUNTER — OFFICE VISIT (OUTPATIENT)
Dept: FAMILY MEDICINE | Facility: CLINIC | Age: 37
End: 2022-11-21
Payer: COMMERCIAL

## 2022-11-21 VITALS
OXYGEN SATURATION: 98 % | HEIGHT: 64 IN | SYSTOLIC BLOOD PRESSURE: 100 MMHG | DIASTOLIC BLOOD PRESSURE: 68 MMHG | BODY MASS INDEX: 26.98 KG/M2 | WEIGHT: 158 LBS | RESPIRATION RATE: 16 BRPM | HEART RATE: 90 BPM | TEMPERATURE: 98.3 F

## 2022-11-21 DIAGNOSIS — C74.01 ADRENAL CORTEX CANCER, RIGHT (H): ICD-10-CM

## 2022-11-21 DIAGNOSIS — R74.8 ELEVATED ALKALINE PHOSPHATASE LEVEL: ICD-10-CM

## 2022-11-21 DIAGNOSIS — K31.7 POLYP OF DUODENUM: ICD-10-CM

## 2022-11-21 DIAGNOSIS — F33.1 MAJOR DEPRESSIVE DISORDER, RECURRENT EPISODE, MODERATE WITH ANXIOUS DISTRESS (H): ICD-10-CM

## 2022-11-21 DIAGNOSIS — Z01.818 PREOP GENERAL PHYSICAL EXAM: Primary | ICD-10-CM

## 2022-11-21 DIAGNOSIS — C74.91: ICD-10-CM

## 2022-11-21 DIAGNOSIS — F33.1 MODERATE EPISODE OF RECURRENT MAJOR DEPRESSIVE DISORDER (H): ICD-10-CM

## 2022-11-21 LAB
ERYTHROCYTE [DISTWIDTH] IN BLOOD BY AUTOMATED COUNT: 13.5 % (ref 10–15)
HCG UR QL: NEGATIVE
HCT VFR BLD AUTO: 39 % (ref 35–47)
HGB BLD-MCNC: 12.1 G/DL (ref 11.7–15.7)
MCH RBC QN AUTO: 26.4 PG (ref 26.5–33)
MCHC RBC AUTO-ENTMCNC: 31 G/DL (ref 31.5–36.5)
MCV RBC AUTO: 85 FL (ref 78–100)
PLATELET # BLD AUTO: 277 10E3/UL (ref 150–450)
RBC # BLD AUTO: 4.59 10E6/UL (ref 3.8–5.2)
WBC # BLD AUTO: 5.3 10E3/UL (ref 4–11)

## 2022-11-21 PROCEDURE — 99000 SPECIMEN HANDLING OFFICE-LAB: CPT | Performed by: FAMILY MEDICINE

## 2022-11-21 PROCEDURE — 85027 COMPLETE CBC AUTOMATED: CPT | Performed by: FAMILY MEDICINE

## 2022-11-21 PROCEDURE — 36415 COLL VENOUS BLD VENIPUNCTURE: CPT | Performed by: FAMILY MEDICINE

## 2022-11-21 PROCEDURE — 81025 URINE PREGNANCY TEST: CPT | Performed by: FAMILY MEDICINE

## 2022-11-21 PROCEDURE — 99214 OFFICE O/P EST MOD 30 MIN: CPT | Performed by: FAMILY MEDICINE

## 2022-11-21 PROCEDURE — 84244 ASSAY OF RENIN: CPT | Mod: 90 | Performed by: FAMILY MEDICINE

## 2022-11-21 RX ORDER — BUPROPION HYDROCHLORIDE 150 MG/1
150 TABLET, EXTENDED RELEASE ORAL 3 TIMES DAILY
Qty: 270 TABLET | Refills: 1
Start: 2022-11-21

## 2022-11-21 ASSESSMENT — PATIENT HEALTH QUESTIONNAIRE - PHQ9
10. IF YOU CHECKED OFF ANY PROBLEMS, HOW DIFFICULT HAVE THESE PROBLEMS MADE IT FOR YOU TO DO YOUR WORK, TAKE CARE OF THINGS AT HOME, OR GET ALONG WITH OTHER PEOPLE: SOMEWHAT DIFFICULT
SUM OF ALL RESPONSES TO PHQ QUESTIONS 1-9: 7
SUM OF ALL RESPONSES TO PHQ QUESTIONS 1-9: 7

## 2022-11-21 NOTE — PATIENT INSTRUCTIONS
Preparing for Your Surgery  Getting started  A nurse will call you to review your health history and instructions. They will give you an arrival time based on your scheduled surgery time. Please be ready to share:    Your doctor s clinic name and phone number    Your medical, surgical, and anesthesia history    A list of allergies and sensitivities    A list of medicines, including herbal treatments and over-the-counter drugs    Whether the patient has a legal guardian (ask how to send us the papers in advance)  Please tell us if you re pregnant--or if there s any chance you might be pregnant. Some surgeries may injure a fetus (unborn baby), so they require a pregnancy test. Surgeries that are safe for a fetus don t always need a test, and you can choose whether to have one.   If you have a child who s having surgery, please ask for a copy of Preparing for Your Child s Surgery.    Preparing for surgery    Within 10 to 30 days of surgery: Have a pre-op exam (sometimes called an H&P, or History and Physical). This can be done at a clinic or pre-operative center.  ? If you re having a , you may not need this exam. Talk to your care team.    At your pre-op exam, talk to your care team about all medicines you take. If you need to stop any medicines before surgery, ask when to start taking them again.  ? We do this for your safety. Many medicines can make you bleed too much during surgery. Some change how well surgery (anesthesia) drugs work.    Call your insurance company to let them know you re having surgery. (If you don t have insurance, call [phone].)    Call your clinic if there s any change in your health. This includes signs of a cold or flu (sore throat, runny nose, cough, rash, fever). It also includes a scrape or scratch near the surgery site.    If you have questions on the day of surgery, call your hospital or surgery center.  COVID testing  You may need to be tested for COVID-19 before having  surgery. If so, we will give you instructions (or click here).  Eating and drinking guidelines  For your safety: Unless your surgeon tells you otherwise, follow the guidelines below.    Eat and drink as usual until 8 hours before you arrive for surgery. After that, no food or milk.    Drink clear liquids until 2 hours before you arrive. These are liquids you can see through, like water, Gatorade, and Propel Water. They also include plain black coffee and tea (no cream or milk), candy, and breath mints. You can spit out gum when you arrive.    If you drink alcohol: Stop drinking it the night before surgery.    If your care team tells you to take medicine on the morning of surgery, it s okay to take it with a sip of water.  Preventing infection    Shower or bathe the night before and morning of your surgery. Follow the instructions your clinic gave you. (If no instructions, use regular soap.)    Don t shave or clip hair near your surgery site. We ll remove the hair if needed.    Don t smoke or vape the morning of surgery. You may chew nicotine gum up to 2 hours before surgery. A nicotine patch is okay.  ? Note: Some surgeries require you to completely quit smoking and nicotine. Check with your surgeon.    Your care team will make every effort to keep you safe from infection. We will:  ? Clean our hands often with soap and water (or an alcohol-based hand rub).  ? Clean the skin at your surgery site with a special soap that kills germs.  ? Give you a special gown to keep you warm. (Cold raises the risk of infection.)  ? Wear special hair covers, masks, gowns and gloves during surgery.  ? Give antibiotic medicine, if prescribed. Not all surgeries need antibiotics.  What to bring on the day of surgery    Photo ID and insurance card    Copy of your health care directive, if you have one    Glasses and hearing aids (bring cases)  ? You can t wear contacts during surgery    Inhaler and eye drops, if you use them (tell us  about these when you arrive)    CPAP machine or breathing device, if you use them    A few personal items, if spending the night    If you have . . .  ? A pacemaker, ICD (cardiac defibrillator) or other implant: Bring the ID card.  ? An implanted stimulator: Bring the remote control.  ? A legal guardian: Bring a copy of the certified (court-stamped) guardianship papers.  Please remove any jewelry, including body piercings. Leave jewelry and other valuables at home.  If you re going home the day of surgery    You must have a responsible adult drive you home. They should stay with you overnight as well.    If you don t have someone to stay with you, and you aren t safe to go home alone, we may keep you overnight. Insurance often won t pay for this.  After surgery  If it s hard to control your pain or you need more pain medicine, please call your surgeon s office.  Questions?   If you have any questions for your care team, list them here:   ____________________________________________________________________________________________________________________________________________________________________________________________________________________________________________________________________  For informational purposes only. Not to replace the advice of your health care provider. Copyright   2003, 2019 [employer]. All rights reserved. Clinically reviewed by Hina Salazar MD. [place] [id number] - REV 10/22.

## 2022-11-21 NOTE — PROGRESS NOTES
[facility]  [address] [address]  Phone: [phone]  Primary Provider: Ivette Leal  Pre-op Performing Provider: IVETTE LEAL      PREOPERATIVE EVALUATION:  Today's date: 11/21/2022    Suzy Rodríguez is a 37 year old female who presents for a preoperative evaluation.    Surgical Information:  Surgery/Procedure: ENTEROSCOPY  ESOPHAGOGASTRODUODENOSCOPY, WITH FINE NEEDLE ASPIRATION BIOPSY, WITH ENDOSCOPIC ULTRASOUND GUIDANCE  Surgery Location: [facility] OR [facility]- [facility]  Surgeon: Khoi Gomes MD  Surgery Date: 12/13/22  Time of Surgery: 12:05 PM  Where patient plans to recover: At home with family  Fax number for surgical facility: Note does not need to be faxed, will be available electronically in Epic.    Type of Anesthesia Anticipated: General    Assessment & Plan     The proposed surgical procedure is considered INTERMEDIATE risk.    Preop general physical exam  - REVIEW OF HEALTH MAINTENANCE PROTOCOL ORDERS  - CBC with platelets; Future  - HCG qualitative urine  - CBC with platelets    Polyp of duodenum    Moderate episode of recurrent major depressive disorder (H)  Updated dosing  - FLUoxetine (PROZAC) 20 MG capsule; Take 3 capsules (60 mg) by mouth 3 times daily    Major depressive disorder, recurrent episode, moderate with anxious distress (H)  Updated dosing  - buPROPion (WELLBUTRIN SR) 150 MG 12 hr tablet; Take 1 tablet (150 mg) by mouth 3 times daily      Possible Sleep Apnea: Patient states that she was diagnosed with sleep apnea in 2004.  She has since lost significant weight and has not really had any further issues.          Risks and Recommendations:  The patient has the following additional risks and recommendations for perioperative complications:   - No identified additional risk factors other than previously addressed    Medication Instructions:  Patient is to take all scheduled medications on the day of surgery EXCEPT for  modifications listed below:   Will hold ritalin and calcium supplement    RECOMMENDATION:  APPROVAL GIVEN to proceed with proposed procedure, without further diagnostic evaluation.          Subjective     HPI related to upcoming procedure: Iris is a very pleasant 37-year-old female who presents to the clinic today for preoperative examination.  Patient has a history of adrenal cancer.  She has a polyp that has been noted in her duodenum which has been enlarging and needs to be biopsied.    Preop Questions 11/21/2022   1. Have you ever had a heart attack or stroke? No   2. Have you ever had surgery on your heart or blood vessels, such as a stent placement, a coronary artery bypass, or surgery on an artery in your head, neck, heart, or legs? No   3. Do you have chest pain with activity? No   4. Do you have a history of  heart failure? No   5. Do you currently have a cold, bronchitis or symptoms of other infection? No   6. Do you have a cough, shortness of breath, or wheezing? No   7. Do you or anyone in your family have previous history of blood clots? YES - possible PE 2009?? - unsure and was not treated   8. Do you or does anyone in your family have a serious bleeding problem such as prolonged bleeding following surgeries or cuts? Patient has MTHFR homozygous   9. Have you ever had problems with anemia or been told to take iron pills? YES - secondary to heavy menses - now has IUD so is not an issue   10. Have you had any abnormal blood loss such as black, tarry or bloody stools, or abnormal vaginal bleeding? No   11. Have you ever had a blood transfusion? YES - after surgery   11a. Have you ever had a transfusion reaction? No   12. Are you willing to have a blood transfusion if it is medically needed before, during, or after your surgery? Yes   13. Have you or any of your relatives ever had problems with anesthesia? No   14. Do you have sleep apnea, excessive snoring or daytime drowsiness? YES - diagnosed in 2004  but lost weight and no longer needs   14a. Do you have a CPAP machine? Yes   15. Do you have any artifical heart valves or other implanted medical devices like a pacemaker, defibrillator, or continuous glucose monitor? No   16. Do you have artificial joints? No   17. Are you allergic to latex? No   18. Is there any chance that you may be pregnant? No       Health Care Directive:  Patient does not have a Health Care Directive or Living Will: Discussed advance care planning with patient; however, patient declined at this time.    Preoperative Review of :   reviewed - no record of controlled substances prescribed.      Status of Chronic Conditions:  See problem list for active medical problems.  Problems all longstanding and stable, except as noted/documented.  See ROS for pertinent symptoms related to these conditions.      Review of Systems  Constitutional, neuro, ENT, endocrine, pulmonary, cardiac, gastrointestinal, genitourinary, musculoskeletal, integument and psychiatric systems are negative, except as otherwise noted.    Patient Active Problem List    Diagnosis Date Noted     Attention deficit hyperactivity disorder, combined type, moderate 10/25/2021     Priority: Medium     Major depressive disorder, recurrent episode, moderate with anxious distress (H) 05/19/2020     Priority: Medium     Adnexal cyst 09/24/2019     Priority: Medium     Hypothyroidism 12/01/2018     Priority: Medium     Hypocalcemia 12/01/2018     Priority: Medium     Herpes simplex infection of genitourinary system 11/30/2018     Priority: Medium     Generalized anxiety disorder 10/18/2018     Priority: Medium     Overview:   Created by Conversion    Overview:   Overview:   Created by Conversion  Overview:   Created by Conversion       Allergic urticaria 10/18/2018     Priority: Medium     Overview:   Created by Conversion       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     Priority: Medium     Primary adrenal insufficiency (H) 08/01/2018      Priority: Medium     Carcinoma, adrenal cortical (H) 07/18/2018     Priority: Medium     Malignant neoplasm of cortex of right adrenal gland (H) 06/25/2018     Priority: Medium     Cushing's syndrome (H) 06/18/2018     Priority: Medium     Overview:   Overview:   24 hr urine cortisol ~300 mcg/24 hr  Overview:   24 hr urine cortisol ~300 mcg/24 hr  Overview:   Overview:   24 hr urine cortisol ~300 mcg/24 hr       Hypertension due to endocrine disorder 06/18/2018     Priority: Medium     Calculus of kidney 05/09/2018     Priority: Medium     S/P gastric bypass 06/08/2016     Priority: Medium     Overview:   6.8.16 with Dr. Randy Sutherland.  Preop weight at intake was 230 lbs.       MTHFR mutation 04/21/2016     Priority: Medium     Overview:   Homozygous per patient  Overview:   Homozygous per patient        Past Medical History:   Diagnosis Date     Adrenal cortex cancer, right (H) 6/30/2018    Resected 6/25/18, Dr. Mansfield.     Adrenal mass (H)      Anemia     thought due to heavy menses.     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  Check in May. SCP started.  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     SHREYA on CPAP 12/17/2015     "Stopped using CPAP the  after weight loss as even at lower pressure/adjustment couldn't tolerate the cpap. Resting well, cautioned to watch for any signs of fatigue and consider \"titration study\" in the future to see if cpap is still required at her new weight.     Pneumonia     hx of recurrent pneumonia issues/respiratory infections.     Polycystic ovary syndrome     able to get pregnant, not on meds.     Pulmonary embolism (H)  or so    briefly on wafarin.     Sleep apnea     cpap     Vitamin D deficiency      Past Surgical History:   Procedure Laterality Date     ADRENALECTOMY Right 2018    Procedure: ADRENALECTOMY;  Right Adrenalectomy,  Embolize Liver , Anesthesia Block;  Surgeon: Warren Mansfield MD;  Location: [facility]     ADRENALECTOMY       ADRENALECTOMY Right       SECTION      twice      SECTION      2      SECTION      x2     EMBOLECTOMY ABDOMEN N/A 2018    Procedure: EMBOLECTOMY ABDOMEN;;  Surgeon: Ector Mcintyre MD;  Location: [facility]     EXTRACORPOREAL SHOCK WAVE LITHOTRIPSY (ESWL)       GASTRIC BYPASS       NJ CYSTO/URETERO W/LITHOTRIPSY &INDWELL STENT INSRT Left 2018    Procedure: CYSTOSCOPY, LEFT URETEROSCOPY LASER LITHOTRIPSY STENT INSERTION;  Surgeon: Skyler Delvalle MD;  Location: [facility] OR;  Service: Urology     N[place] LAP GASTRIC BYPASS/DERICK-EN-Y N/A 2016    Procedure: GASTRIC BYPASS LAPAROSCOPIC DERICK-EN-Y;  Surgeon: Randy Sutherland MD;  Location: [facility] OR;  Service: General     TUBAL LIGATION       WISDOM TOOTH EXTRACTION Bilateral      Current Outpatient Medications   Medication Sig Dispense Refill     acetaminophen (TYLENOL) 325 MG tablet Take 2 tablets (650 mg) by mouth every 4 hours as needed for other (multimodal surgical pain management along with NSAIDS and opioid medication as indicated based on pain control and physical function.) 150 tablet 0     buPROPion (WELLBUTRIN SR) 150 MG 12 hr tablet " "TAKE ONE TABLET BY MOUTH TWICE A  tablet 1     clonazePAM (KLONOPIN) 0.5 MG tablet Take 1 tablet (0.5 mg) by mouth 2 times daily as needed for anxiety 60 tablet 0     FLUoxetine (PROZAC) 20 MG capsule Take 3 capsules (60 mg) by mouth daily 270 capsule 1     gabapentin (NEURONTIN) 300 MG capsule TAKE THREE CAPSULES BY MOUTH THREE TIMES A  capsule 3     temazepam (RESTORIL) 15 MG capsule Take 1-2 capsules (15-30 mg) by mouth nightly as needed for sleep 60 capsule 0       Allergies   Allergen Reactions     Bee Venom Swelling     Ibuprofen Hives and Swelling        Social History     Tobacco Use     Smoking status: Former     Smokeless tobacco: Never     Tobacco comments:     vapes marijuana   Substance Use Topics     Alcohol use: Yes     Comment: occ.     Family History   Problem Relation Age of Onset     Depression Paternal Grandmother      Diabetes Mother      Diabetes Father      Hypertension Father      Chronic Obstructive Pulmonary Disease Father      Cancer Maternal Grandmother         gastric     Urolithiasis Maternal Grandmother      Heart Disease Maternal Grandfather      Cancer Maternal Grandfather         lung     Heart Disease Paternal Grandfather      Breast Cancer No family hx of      Colon Cancer No family hx of      Prostate Cancer No family hx of      Cerebrovascular Disease No family hx of      Clotting Disorder No family hx of      Gout No family hx of      History   Drug Use No         Objective     /68   Pulse 90   Temp 98.3  F (36.8  C) (Tympanic)   Resp 16   Ht 1.626 m (5' 4\")   Wt 71.7 kg (158 lb)   SpO2 98%   BMI 27.12 kg/m      Physical Exam  Objective:  Vital signs reviewed and stable  General: No acute distress  Psych: Appropriate affect  HEENT: moist mucous membranes, pupils equal, round, reactive to light and accomodation, tympanic membranes are pearly grey bilaterally  Lymph: no cervical or supraclavicular lymphadenopathy  Cardiovascular: regular rate and rhythm " with no murmur  Pulmonary: clear to auscultation bilaterally with no wheeze  Abdomen: soft, non tender, non distended with normo-active bowel sounds  Extremities: warm and well perfused with no edema  Skin: warm and dry with no rash      Recent Labs   Lab Test 11/07/22  1326 10/25/22  1003 09/29/22  0758   HGB 12.4 12.5 11.7    253 255   NA  --  139 141  141   POTASSIUM  --  4.2 4.2  4.3   CR  --  0.75 0.82  0.80        Diagnostics:  Recent Results (from the past 48 hour(s))   HCG qualitative urine    Collection Time: 11/21/22  3:16 PM   Result Value Ref Range    hCG Urine Qualitative Negative Negative   CBC with platelets    Collection Time: 11/21/22  3:27 PM   Result Value Ref Range    WBC Count 5.3 4.0 - 11.0 10e3/uL    RBC Count 4.59 3.80 - 5.20 10e6/uL    Hemoglobin 12.1 11.7 - 15.7 g/dL    Hematocrit 39.0 35.0 - 47.0 %    MCV 85 78 - 100 fL    MCH 26.4 (L) 26.5 - 33.0 pg    MCHC 31.0 (L) 31.5 - 36.5 g/dL    RDW 13.5 10.0 - 15.0 %    Platelet Count 277 150 - 450 10e3/uL      No EKG required, no history of coronary heart disease, significant arrhythmia, peripheral arterial disease or other structural heart disease.    Revised Cardiac Risk Index (RCRI):  The patient has the following serious cardiovascular risks for perioperative complications:   - No serious cardiac risks = 0 points     RCRI Interpretation: 0 points: Class I (very low risk - 0.4% complication rate)           Signed Electronically by: Ivette Leal MD  Copy of this evaluation report is provided to requesting physician.      Answers for HPI/ROS submitted by the patient on 11/21/2022  If you checked off any problems, how difficult have these problems made it for you to do your work, take care of things at home, or get along with other people?: Somewhat difficult  PHQ9 TOTAL SCORE: 7

## 2022-11-25 LAB — RENIN PLAS-CCNC: 4.2 NG/ML/HR

## 2022-12-12 DIAGNOSIS — E03.8 SECONDARY HYPOTHYROIDISM: Primary | ICD-10-CM

## 2022-12-12 RX ORDER — METHYLPHENIDATE HYDROCHLORIDE 20 MG/1
20 TABLET ORAL 2 TIMES DAILY
COMMUNITY
End: 2023-11-27

## 2022-12-12 RX ORDER — LEVOTHYROXINE SODIUM 75 UG/1
75 TABLET ORAL DAILY
Qty: 90 TABLET | Refills: 1 | Status: SHIPPED | OUTPATIENT
Start: 2022-12-12 | End: 2023-06-09

## 2022-12-12 RX ORDER — LEVOTHYROXINE SODIUM 75 UG/1
75 TABLET ORAL DAILY
COMMUNITY
End: 2022-12-12

## 2022-12-13 ENCOUNTER — HOSPITAL ENCOUNTER (OUTPATIENT)
Facility: CLINIC | Age: 37
Discharge: HOME | End: 2022-12-13
Attending: INTERNAL MEDICINE | Admitting: INTERNAL MEDICINE
Payer: COMMERCIAL

## 2022-12-13 ENCOUNTER — ANESTHESIA EVENT (OUTPATIENT)
Dept: SURGERY | Facility: CLINIC | Age: 37
End: 2022-12-13
Payer: COMMERCIAL

## 2022-12-13 ENCOUNTER — ANESTHESIA (OUTPATIENT)
Dept: SURGERY | Facility: CLINIC | Age: 37
End: 2022-12-13
Payer: COMMERCIAL

## 2022-12-13 VITALS
WEIGHT: 156.75 LBS | OXYGEN SATURATION: 99 % | RESPIRATION RATE: 15 BRPM | BODY MASS INDEX: 26.76 KG/M2 | HEART RATE: 72 BPM | SYSTOLIC BLOOD PRESSURE: 115 MMHG | HEIGHT: 64 IN | DIASTOLIC BLOOD PRESSURE: 80 MMHG | TEMPERATURE: 97.9 F

## 2022-12-13 LAB — UPPER EUS: NORMAL

## 2022-12-13 PROCEDURE — 250N000009 HC RX 250: Performed by: INTERNAL MEDICINE

## 2022-12-13 PROCEDURE — 999N000141 HC STATISTIC PRE-PROCEDURE NURSING ASSESSMENT: Performed by: INTERNAL MEDICINE

## 2022-12-13 PROCEDURE — 250N000011 HC RX IP 250 OP 636: Performed by: ANESTHESIOLOGY

## 2022-12-13 PROCEDURE — 272N000001 HC OR GENERAL SUPPLY STERILE: Performed by: INTERNAL MEDICINE

## 2022-12-13 PROCEDURE — 710N000012 HC RECOVERY PHASE 2, PER MINUTE: Performed by: INTERNAL MEDICINE

## 2022-12-13 PROCEDURE — 360N000076 HC SURGERY LEVEL 3, PER MIN: Performed by: INTERNAL MEDICINE

## 2022-12-13 PROCEDURE — 370N000017 HC ANESTHESIA TECHNICAL FEE, PER MIN: Performed by: INTERNAL MEDICINE

## 2022-12-13 PROCEDURE — 250N000009 HC RX 250: Performed by: ANESTHESIOLOGY

## 2022-12-13 PROCEDURE — 250N000011 HC RX IP 250 OP 636: Performed by: REGISTERED NURSE

## 2022-12-13 PROCEDURE — 258N000003 HC RX IP 258 OP 636: Performed by: ANESTHESIOLOGY

## 2022-12-13 RX ORDER — MEPERIDINE HYDROCHLORIDE 25 MG/ML
12.5 INJECTION INTRAMUSCULAR; INTRAVENOUS; SUBCUTANEOUS
Status: DISCONTINUED | OUTPATIENT
Start: 2022-12-13 | End: 2022-12-13 | Stop reason: HOSPADM

## 2022-12-13 RX ORDER — SODIUM CHLORIDE, SODIUM LACTATE, POTASSIUM CHLORIDE, CALCIUM CHLORIDE 600; 310; 30; 20 MG/100ML; MG/100ML; MG/100ML; MG/100ML
INJECTION, SOLUTION INTRAVENOUS CONTINUOUS PRN
Status: DISCONTINUED | OUTPATIENT
Start: 2022-12-13 | End: 2022-12-13

## 2022-12-13 RX ORDER — ONDANSETRON 4 MG/1
4 TABLET, ORALLY DISINTEGRATING ORAL EVERY 30 MIN PRN
Status: DISCONTINUED | OUTPATIENT
Start: 2022-12-13 | End: 2022-12-13 | Stop reason: HOSPADM

## 2022-12-13 RX ORDER — FENTANYL CITRATE 50 UG/ML
25 INJECTION, SOLUTION INTRAMUSCULAR; INTRAVENOUS
Status: DISCONTINUED | OUTPATIENT
Start: 2022-12-13 | End: 2022-12-13 | Stop reason: HOSPADM

## 2022-12-13 RX ORDER — FENTANYL CITRATE 50 UG/ML
50 INJECTION, SOLUTION INTRAMUSCULAR; INTRAVENOUS EVERY 5 MIN PRN
Status: DISCONTINUED | OUTPATIENT
Start: 2022-12-13 | End: 2022-12-13 | Stop reason: HOSPADM

## 2022-12-13 RX ORDER — SODIUM CHLORIDE, SODIUM LACTATE, POTASSIUM CHLORIDE, CALCIUM CHLORIDE 600; 310; 30; 20 MG/100ML; MG/100ML; MG/100ML; MG/100ML
INJECTION, SOLUTION INTRAVENOUS CONTINUOUS
Status: DISCONTINUED | OUTPATIENT
Start: 2022-12-13 | End: 2022-12-13 | Stop reason: HOSPADM

## 2022-12-13 RX ORDER — HYDROMORPHONE HYDROCHLORIDE 1 MG/ML
0.4 INJECTION, SOLUTION INTRAMUSCULAR; INTRAVENOUS; SUBCUTANEOUS EVERY 5 MIN PRN
Status: DISCONTINUED | OUTPATIENT
Start: 2022-12-13 | End: 2022-12-13 | Stop reason: HOSPADM

## 2022-12-13 RX ORDER — PROPOFOL 10 MG/ML
INJECTION, EMULSION INTRAVENOUS CONTINUOUS PRN
Status: DISCONTINUED | OUTPATIENT
Start: 2022-12-13 | End: 2022-12-13

## 2022-12-13 RX ORDER — ONDANSETRON 2 MG/ML
4 INJECTION INTRAMUSCULAR; INTRAVENOUS
Status: DISCONTINUED | OUTPATIENT
Start: 2022-12-13 | End: 2022-12-13 | Stop reason: HOSPADM

## 2022-12-13 RX ORDER — ONDANSETRON 2 MG/ML
4 INJECTION INTRAMUSCULAR; INTRAVENOUS EVERY 30 MIN PRN
Status: DISCONTINUED | OUTPATIENT
Start: 2022-12-13 | End: 2022-12-13 | Stop reason: HOSPADM

## 2022-12-13 RX ORDER — FENTANYL CITRATE 50 UG/ML
25 INJECTION, SOLUTION INTRAMUSCULAR; INTRAVENOUS EVERY 5 MIN PRN
Status: DISCONTINUED | OUTPATIENT
Start: 2022-12-13 | End: 2022-12-13 | Stop reason: HOSPADM

## 2022-12-13 RX ORDER — ONDANSETRON 2 MG/ML
INJECTION INTRAMUSCULAR; INTRAVENOUS PRN
Status: DISCONTINUED | OUTPATIENT
Start: 2022-12-13 | End: 2022-12-13

## 2022-12-13 RX ORDER — LABETALOL HYDROCHLORIDE 5 MG/ML
10 INJECTION, SOLUTION INTRAVENOUS
Status: DISCONTINUED | OUTPATIENT
Start: 2022-12-13 | End: 2022-12-13 | Stop reason: HOSPADM

## 2022-12-13 RX ORDER — FENTANYL CITRATE 50 UG/ML
INJECTION, SOLUTION INTRAMUSCULAR; INTRAVENOUS PRN
Status: DISCONTINUED | OUTPATIENT
Start: 2022-12-13 | End: 2022-12-13

## 2022-12-13 RX ORDER — LIDOCAINE HYDROCHLORIDE 20 MG/ML
INJECTION, SOLUTION INFILTRATION; PERINEURAL PRN
Status: DISCONTINUED | OUTPATIENT
Start: 2022-12-13 | End: 2022-12-13

## 2022-12-13 RX ORDER — DEXAMETHASONE SODIUM PHOSPHATE 4 MG/ML
INJECTION, SOLUTION INTRA-ARTICULAR; INTRALESIONAL; INTRAMUSCULAR; INTRAVENOUS; SOFT TISSUE PRN
Status: DISCONTINUED | OUTPATIENT
Start: 2022-12-13 | End: 2022-12-13

## 2022-12-13 RX ORDER — LIDOCAINE 40 MG/G
CREAM TOPICAL
Status: DISCONTINUED | OUTPATIENT
Start: 2022-12-13 | End: 2022-12-13 | Stop reason: HOSPADM

## 2022-12-13 RX ADMIN — MIDAZOLAM 2 MG: 1 INJECTION INTRAMUSCULAR; INTRAVENOUS at 11:21

## 2022-12-13 RX ADMIN — LIDOCAINE HYDROCHLORIDE 100 MG: 20 INJECTION, SOLUTION INFILTRATION; PERINEURAL at 11:24

## 2022-12-13 RX ADMIN — ONDANSETRON 4 MG: 2 INJECTION INTRAMUSCULAR; INTRAVENOUS at 11:29

## 2022-12-13 RX ADMIN — FENTANYL CITRATE 50 MCG: 50 INJECTION, SOLUTION INTRAMUSCULAR; INTRAVENOUS at 11:25

## 2022-12-13 RX ADMIN — SODIUM CHLORIDE, POTASSIUM CHLORIDE, SODIUM LACTATE AND CALCIUM CHLORIDE: 600; 310; 30; 20 INJECTION, SOLUTION INTRAVENOUS at 11:22

## 2022-12-13 RX ADMIN — DEXAMETHASONE SODIUM PHOSPHATE 8 MG: 4 INJECTION, SOLUTION INTRA-ARTICULAR; INTRALESIONAL; INTRAMUSCULAR; INTRAVENOUS; SOFT TISSUE at 11:29

## 2022-12-13 RX ADMIN — PROPOFOL 200 MCG/KG/MIN: 10 INJECTION, EMULSION INTRAVENOUS at 11:24

## 2022-12-13 ASSESSMENT — ACTIVITIES OF DAILY LIVING (ADL): ADLS_ACUITY_SCORE: 35

## 2022-12-13 ASSESSMENT — LIFESTYLE VARIABLES: TOBACCO_USE: 0

## 2022-12-13 NOTE — OP NOTE
Upper EUS 12/13/2022 11:12 AM [address] [place] [address] [phone]     Endoscopy Department   _______________________________________________________________________________   Patient Name: Suzy Rodríguez       Procedure Date: 12/13/2022 11:12 AM   MRN: [id number]                       Account Number: [id number]   YOB: 1985             Admit Type: Outpatient   Age: 37                               Room: [place] [address]   Gender: Female                        Note Status: Finalized   Attending MD: MIKE PATTEN MD  Total Sedation Time:   _______________________________________________________________________________       Procedure:             Upper EUS   Indications:           Abnormal abdominal/pelvic CT scan   Providers:             MIKE PATTEN MD   Patient Profile:       Ms Rodríguez is a 36yo woman with RYGB antomy who is                          found to have a duodenal lesion on CT who now proceeds                          to EUS for evaluation.   Referring MD:          SANDRA CRUZ MD   Medicines:             Deep sedation was administered   Complications:         No immediate complications.   _______________________________________________________________________________   Procedure:             Pre-Anesthesia Assessment:                          - Prior to the procedure, a History and Physical was                          performed, and patient medications and allergies were                          reviewed. The patient is competent. The risks and                          benefits of the procedure and the sedation options and                          risks were discussed with the patient. All questions                          were answered and informed consent was obtained.                          Patient identification and proposed procedure were                          verified by the nurse in  the pre-procedure area.                          Mental Status Examination: alert and oriented. Airway                          Examination: Mallampati Class II (the uvula but not                          tonsillar pillars visualized). Respiratory                          Examination: clear to auscultation. CV Examination:                          normal. ASA Grade Assessment: II - A patient with mild                          systemic disease. After reviewing the risks and                          benefits, the patient was deemed in satisfactory                          condition to undergo the procedure. The anesthesia                          plan was to use deep sedation / analgesia. Immediately                          prior to administration of medications, the patient                          was re-assessed for adequacy to receive sedatives. The                          heart rate, respiratory rate, oxygen saturations,                          blood pressure, adequacy of pulmonary ventilation, and                          response to care were monitored throughout the                          procedure. The physical status of the patient was                          re-assessed after the procedure. After obtaining                          informed consent, the endoscope was passed under                          direct vision. Throughout the procedure, the patient's                          blood pressure, pulse, and oxygen saturations were                          monitored continuously. The endoscopes were introduced                          through the mouth, and advanced to the jejunum. The                          upper EUS was accomplished without difficulty. The                          patient tolerated the procedure well.                                                                                     Findings:        White light and endosonographic findings :        The patient was left lateral  under deep sedation throughout. We began        with a gastroscope which revealed an unremarkable esophagus without        lesion or inflammation. The squamcolumnar line was found at the        gastroesophageal junction without signficant irregularity. The pouch was        long without lesion and the gastrojejunostomy was widely patent without        lesion. The proximal Tanika was unremarkable. We then exchange for a        linear echoendsocope. A focused exam demonstrated that the proximal        duodenum (sweep) was out of view along with the suspicious lesion. Views        of the pancreatic neck, body and tail were without lesion and the main        duct was grossly decompressed. While the gallbladder was found without        thickening and with small stones, the extrahepatic biliary system and        the head of the pancreas were out of view. Views of the left lobe were        unremarkable. There was no lymphadenopathy appreciatred. The celiac and        splenics were unremarkable.                                                                                     Impression:            - Tanika en Y gastric bypass anatomy with long pouch and                          widely patent gastrojejunostomy                          - Unremarkable esophagus and proximal Tanika                          - Focused EUS was unremarkable                          - Duodenal lesion out of range for evaluation by white                          light or endosonorgaphic imaging (while balloon                          enteroscopy may allow an approach, tandem catheter                          based EUS would not be feasible and endoscopic EMR                          unlikely)   Recommendation:        - Deep sedation recovery with probable discharge home                          this afternoon                          - We will organize repeat endoscopic ultrasound with                          plan for gastrogastrostomy to allow  subsequent (2                          weeks later) per os access to the duodenum for further                          evaluation and management of the duodenal lesion seen                          on CT                          - All medications, diet and activity may resume                          without delay                          - The findings and recommendations were discussed with                          the patient and their family                                                                                       electronically signed by RIVAS Gomes

## 2022-12-13 NOTE — ANESTHESIA PREPROCEDURE EVALUATION
"Anesthesia Pre-Procedure Evaluation    Patient: Suzy Rodríguez   MRN: [id number] : 1985        Procedure : Procedure(s):  ENTEROSCOPY  ESOPHAGOGASTRODUODENOSCOPY, WITH FINE NEEDLE ASPIRATION BIOPSY, WITH ENDOSCOPIC ULTRASOUND GUIDANCE          Past Medical History:   Diagnosis Date     Adrenal cortex cancer, right (H) 2018    Resected 18, Dr. Mansfield.     Adrenal mass (H)      Anemia     thought due to heavy menses.     Calculus of ureter 2018     Carcinoma, adrenal cortical (H) 2018     Chocolate cyst of ovary      Clotting disorder (H)      Depression      GERD (gastroesophageal reflux disease)      History of miscarriage      Hypertension due to endocrine disorder 2018     Kidney stones     passed on their own     Malignant neoplasm of cortex of right adrenal gland (H) 2018    EOTD; 18.  Check in May. SCP started.  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     SHREYA on CPAP 2015    Stopped using CPAP the  after weight loss as even at lower pressure/adjustment couldn't tolerate the cpap. Resting well, cautioned to watch for any signs of fatigue and consider \"titration study\" in the future to see if cpap is still required at her new weight.     Pneumonia     hx of recurrent pneumonia issues/respiratory infections.     Polycystic ovary syndrome     able to get pregnant, not on meds.     Pulmonary embolism (H)  or so    briefly on wafarin.     Sleep apnea     cpap     Vitamin D deficiency       Past Surgical " History:   Procedure Laterality Date     ADRENALECTOMY Right 2018    Procedure: ADRENALECTOMY;  Right Adrenalectomy,  Embolize Liver , Anesthesia Block;  Surgeon: Warren Mansfield MD;  Location: [facility] OR     ADRENALECTOMY       ADRENALECTOMY Right       SECTION      twice      SECTION      2      SECTION      x2     EMBOLECTOMY ABDOMEN N/A 2018    Procedure: EMBOLECTOMY ABDOMEN;;  Surgeon: Ector Mcintyre MD;  Location: [facility] OR     EXTRACORPOREAL SHOCK WAVE LITHOTRIPSY (ESWL)       GASTRIC BYPASS       CA CYSTO/URETERO W/LITHOTRIPSY &INDWELL STENT INSRT Left 2018    Procedure: CYSTOSCOPY, LEFT URETEROSCOPY LASER LITHOTRIPSY STENT INSERTION;  Surgeon: Skyler Delvalle MD;  Location: [facility];  Service: Urology     CA LAP GASTRIC BYPASS/DERICK-EN-Y N/A 2016    Procedure: GASTRIC BYPASS LAPAROSCOPIC DERICK-EN-Y;  Surgeon: Randy Sutherland MD;  Location: [facility];  Service: General     TUBAL LIGATION       WISDOM TOOTH EXTRACTION Bilateral       Allergies   Allergen Reactions     Bee Venom Swelling     Ibuprofen Hives and Swelling      Social History     Tobacco Use     Smoking status: Former     Smokeless tobacco: Never     Tobacco comments:     vapes marijuana   Substance Use Topics     Alcohol use: Yes     Comment: occ.      Wt Readings from Last 1 Encounters:   22 71.1 kg (156 lb 12 oz)        Anesthesia Evaluation            ROS/MED HX  ENT/Pulmonary:     (+) sleep apnea, moderate, uses CPAP, recent URI, resolved,  (-) tobacco use   Neurologic:  - neg neurologic ROS     Cardiovascular:     (+) hypertension-----    METS/Exercise Tolerance:     Hematologic: Comments: Lab Test        11/21/22     11/07/22     10/25/22     06/30/18     06/29/18     06/25/18     06/14/18                       1527          1326          1003          0345          1310          1245          1052          WBC          5.3          5.0          3.5*            < >        9.7            < >        11.7*         HGB          12.1         12.4         12.5           < >        8.3*           < >        15.8*         MCV          85           85           85             < >        90             < >        91            PLT          277          257          253            < >        256            < >        245           INR           --           --           --           --          0.89          --          0.99           < > = values in this interval not displayed.                  Lab Test        10/25/22     09/29/22     05/12/22                       1003          0758          1017          NA           139          141  141    139           POTASSIUM    4.2          4.2  4.3    4.4           CHLORIDE     104          104  104    107           CO2          27           27  27      27            BUN          9.1          9.2  9.6    6*            CR           0.75         0.82  0.80  0.72          ANIONGAP     8            10  10      5             SHASHA          9.1          8.4*  8.4*  9.1           GLC          91           91  92      86                  Musculoskeletal:  - neg musculoskeletal ROS     GI/Hepatic:     (+) GERD, Asymptomatic on medication,     Renal/Genitourinary: Comment: Polycystic ovary syndrome    (+) renal disease,     Endo:     (+) thyroid problem, hypothyroidism,     Psychiatric/Substance Use:     (+) psychiatric history depression     Infectious Disease:  - neg infectious disease ROS     Malignancy: Comment:  history of adrenal cancer  (+) Malignancy,     Other: Comment: MTHFR mutation             Physical Exam    Airway        Mallampati: II   TM distance: > 3 FB   Neck ROM: full   Mouth opening: > 3 cm    Respiratory Devices and Support         Dental  no notable dental history         Cardiovascular   cardiovascular exam normal          Pulmonary   pulmonary exam normal                OUTSIDE LABS:  CBC:   Lab Results   Component  Value Date    WBC 5.3 11/21/2022    WBC 5.0 11/07/2022    HGB 12.1 11/21/2022    HGB 12.4 11/07/2022    HCT 39.0 11/21/2022    HCT 39.4 11/07/2022     11/21/2022     11/07/2022     BMP:   Lab Results   Component Value Date     10/25/2022     09/29/2022     09/29/2022    POTASSIUM 4.2 10/25/2022    POTASSIUM 4.3 09/29/2022    POTASSIUM 4.2 09/29/2022    CHLORIDE 104 10/25/2022    CHLORIDE 104 09/29/2022    CHLORIDE 104 09/29/2022    CO2 27 10/25/2022    CO2 27 09/29/2022    CO2 27 09/29/2022    BUN 9.1 10/25/2022    BUN 9.6 09/29/2022    BUN 9.2 09/29/2022    CR 0.75 10/25/2022    CR 0.80 09/29/2022    CR 0.82 09/29/2022    GLC 91 10/25/2022    GLC 92 09/29/2022    GLC 91 09/29/2022     COAGS:   Lab Results   Component Value Date    INR 0.89 06/29/2018     POC:   Lab Results   Component Value Date     (H) 06/25/2018    HCG Negative 11/21/2022    HCGS Negative 06/25/2018     HEPATIC:   Lab Results   Component Value Date    ALBUMIN 4.3 10/25/2022    PROTTOTAL 6.5 10/25/2022    ALT 13 10/25/2022    AST 20 10/25/2022    ALKPHOS 139 (H) 10/25/2022    BILITOTAL 0.3 10/25/2022     OTHER:   Lab Results   Component Value Date    PH 7.34 (L) 06/25/2018    LACT 2.2 (H) 06/29/2018    A1C 5.3 10/29/2015    SHASHA 9.1 10/25/2022    LIPASE 144 06/29/2018    TSH 3.14 10/25/2022    T4 0.94 10/25/2022    T3 100 05/12/2022       Anesthesia Plan    ASA Status:  3      Anesthesia Type: General.     - Airway: ETT   Induction: Intravenous, Propofol.   Maintenance: Balanced.        Consents    Anesthesia Plan(s) and associated risks, benefits, and realistic alternatives discussed. Questions answered and patient/representative(s) expressed understanding.    - Discussed:     - Discussed with:  Patient      - Extended Intubation/Ventilatory Support Discussed: No.      - Patient is DNR/DNI Status: No    Use of blood products discussed: Yes.     - Discussed with: Patient.     - Consented: consented to blood  products            Reason for refusal: other.     Postoperative Care    Pain management: IV analgesics.   PONV prophylaxis: Ondansetron (or other 5HT-3), Dexamethasone or Solumedrol     Comments:                Martin Alejandre MD

## 2022-12-13 NOTE — DISCHARGE INSTRUCTIONS
[facility]  Same-Day Surgery   Adult Discharge Orders & Instructions     For 24 hours after surgery    Get plenty of rest.  A responsible adult must stay with you for at least 24 hours after you leave the hospital.   Do not drive or use heavy equipment.  If you have weakness or tingling, don't drive or use heavy equipment until this feeling goes away.  Do not drink alcohol.  Avoid strenuous or risky activities.  Ask for help when climbing stairs.   You may feel lightheaded.  IF so, sit for a few minutes before standing.  Have someone help you get up.   If you have nausea (feel sick to your stomach): Drink only clear liquids such as apple juice, ginger ale, broth or 7-Up.  Rest may also help.  Be sure to drink enough fluids.  Move to a regular diet as you feel able.  You may have a slight fever. Call the doctor if your fever is over 100 F (37.7 C) (taken under the tongue) or lasts longer than 24 hours.  You may have a dry mouth, a sore throat, muscle aches or trouble sleeping.  These should go away after 24 hours.  Do not make important or legal decisions.   Call your doctor for any of the followin.  Signs of infection (fever, growing tenderness at the surgery site, a large amount of drainage or bleeding, severe pain, foul-smelling drainage, redness, swelling).    2. It has been over 8 to 10 hours since surgery and you are still not able to urinate (pass water).    3.  Headache for over 24 hours.    To contact a doctor, call Dr. Gomes at [phone] or:    '   [phone] and ask for the resident on call for   gastroenterology (answered 24 hours a day)  '   Emergency Department:    [facility]: [phone]       (TTY for hearing impaired: [phone])    [facility]: [phone]       (TTY for hearing impaired: [phone])

## 2022-12-13 NOTE — ANESTHESIA POSTPROCEDURE EVALUATION
Patient: Suzy Rodríguez    Procedure: Procedure(s):  ESOPHAGOGASTRODUODENOSCOPY,  NEEDLE  WITH ENDOSCOPIC ULTRASOUND GUIDANCE       Anesthesia Type:  General    Note:  Disposition: Outpatient   Postop Pain Control: Uneventful            Sign Out: Well controlled pain   PONV: No   Neuro/Psych: Uneventful            Sign Out: Acceptable/Baseline neuro status   Airway/Respiratory: Uneventful            Sign Out: Acceptable/Baseline resp. status   CV/Hemodynamics: Uneventful            Sign Out: Acceptable CV status; No obvious hypovolemia; No obvious fluid overload   Other NRE: NONE   DID A NON-ROUTINE EVENT OCCUR? No           Last vitals:  Vitals Value Taken Time   /76 12/13/22 1215   Temp 36.5  C (97.7  F) 12/13/22 1153   Pulse 86 12/13/22 1215   Resp 16 12/13/22 1200   SpO2 99 % 12/13/22 1231   Vitals shown include unvalidated device data.    Electronically Signed By: Martin Alejandre MD  December 13, 2022  12:32 PM

## 2022-12-13 NOTE — ANESTHESIA CARE TRANSFER NOTE
Patient: Suzy Rodríguez    Procedure: Procedure(s):  ESOPHAGOGASTRODUODENOSCOPY,  NEEDLE  WITH ENDOSCOPIC ULTRASOUND GUIDANCE       Diagnosis: Abnormal finding on GI tract imaging [R93.3]  Diagnosis Additional Information: No value filed.    Anesthesia Type:   General     Note:    Oropharynx: oropharynx clear of all foreign objects  Level of Consciousness: awake  Oxygen Supplementation: room air    Independent Airway: airway patency satisfactory and stable  Dentition: dentition unchanged  Vital Signs Stable: post-procedure vital signs reviewed and stable  Report to RN Given: handoff report given  Patient transferred to: Phase II    Handoff Report: Identifed the Patient, Identified the Reponsible Provider, Reviewed the pertinent medical history, Discussed the surgical course, Reviewed Intra-OP anesthesia mangement and issues during anesthesia, Set expectations for post-procedure period and Allowed opportunity for questions and acknowledgement of understanding      Vitals:  Vitals Value Taken Time   /82 12/13/2022   Temp     Pulse     Resp 14 12/13/2022   SpO2 99 12/13/2022       Electronically Signed By: MYRON Walter CRNA  December 13, 2022  11:56 AM

## 2022-12-19 ENCOUNTER — PATIENT OUTREACH (OUTPATIENT)
Dept: GASTROENTEROLOGY | Facility: CLINIC | Age: 37
End: 2022-12-19

## 2022-12-19 ENCOUNTER — PREP FOR PROCEDURE (OUTPATIENT)
Dept: GASTROENTEROLOGY | Facility: CLINIC | Age: 37
End: 2022-12-19

## 2022-12-19 DIAGNOSIS — R93.3 ABNORMAL FINDING ON GI TRACT IMAGING: Primary | ICD-10-CM

## 2022-12-19 NOTE — TELEPHONE ENCOUNTER
Per Dr. Gomes  We will organize repeat endoscopic ultrasound with   plan for gastrogastrostomy to allow subsequent (2     weeks later) per os access to the duodenum for further  evaluation and management of the duodenal lesion seen   on CT     Please assist in scheduling:     Procedure/Imaging/Clinic: EGD/EUS  Physician: Dr. Gomes  Timin23  Procedure length:provider average  Anesthesia:gen  Dx: abnormal finding on GI imaging  Tier:2  Location: [facility]       Pt will get preop prior to procedure, covid test if needed. Pt understands plan.    ML

## 2022-12-20 ENCOUNTER — MYC REFILL (OUTPATIENT)
Dept: FAMILY MEDICINE | Facility: CLINIC | Age: 37
End: 2022-12-20

## 2022-12-20 DIAGNOSIS — F51.01 PRIMARY INSOMNIA: ICD-10-CM

## 2022-12-20 DIAGNOSIS — C74.01 MALIGNANT NEOPLASM OF CORTEX OF RIGHT ADRENAL GLAND (H): ICD-10-CM

## 2022-12-22 RX ORDER — TEMAZEPAM 15 MG/1
15-30 CAPSULE ORAL
Qty: 60 CAPSULE | Refills: 0 | Status: SHIPPED | OUTPATIENT
Start: 2022-12-22 | End: 2023-01-09

## 2022-12-22 NOTE — TELEPHONE ENCOUNTER
Pending Prescriptions:                       Disp   Refills    temazepam (RESTORIL) 15 MG capsule        60 cap*0            Sig: Take 1-2 capsules (15-30 mg) by mouth nightly as           needed for sleep    Last Written Prescription Date:  11/12/22  Last Fill Quantity: 60,   # refills: 0  Last Office Visit: 8/24/22  Future Office visit:       Routing refill request to provider.     Mahendra Gibson, RN, BSN.  RN Care Coordinator     [facility]   [phone]

## 2022-12-26 ENCOUNTER — TELEPHONE (OUTPATIENT)
Dept: FAMILY MEDICINE | Facility: CLINIC | Age: 37
End: 2022-12-26

## 2022-12-26 ENCOUNTER — HEALTH MAINTENANCE LETTER (OUTPATIENT)
Age: 37
End: 2022-12-26

## 2022-12-26 DIAGNOSIS — Z20.828 EXPOSURE TO INFLUENZA: Primary | ICD-10-CM

## 2022-12-26 RX ORDER — OSELTAMIVIR PHOSPHATE 75 MG/1
75 CAPSULE ORAL DAILY
Qty: 7 CAPSULE | Refills: 1 | Status: SHIPPED | OUTPATIENT
Start: 2022-12-26 | End: 2023-01-02

## 2023-01-09 ENCOUNTER — OFFICE VISIT (OUTPATIENT)
Dept: FAMILY MEDICINE | Facility: CLINIC | Age: 38
End: 2023-01-09
Payer: COMMERCIAL

## 2023-01-09 VITALS
OXYGEN SATURATION: 98 % | BODY MASS INDEX: 26.85 KG/M2 | TEMPERATURE: 98.4 F | RESPIRATION RATE: 16 BRPM | DIASTOLIC BLOOD PRESSURE: 78 MMHG | SYSTOLIC BLOOD PRESSURE: 120 MMHG | HEART RATE: 81 BPM | WEIGHT: 157.31 LBS | HEIGHT: 64 IN

## 2023-01-09 DIAGNOSIS — Z01.818 PREOP GENERAL PHYSICAL EXAM: Primary | ICD-10-CM

## 2023-01-09 DIAGNOSIS — E03.8 SECONDARY HYPOTHYROIDISM: ICD-10-CM

## 2023-01-09 DIAGNOSIS — K31.7 POLYP OF DUODENUM: ICD-10-CM

## 2023-01-09 LAB
T4 FREE SERPL-MCNC: 1.23 NG/DL (ref 0.9–1.7)
TSH SERPL DL<=0.005 MIU/L-ACNC: 1.98 UIU/ML (ref 0.3–4.2)

## 2023-01-09 PROCEDURE — 36415 COLL VENOUS BLD VENIPUNCTURE: CPT | Performed by: NURSE PRACTITIONER

## 2023-01-09 PROCEDURE — 84443 ASSAY THYROID STIM HORMONE: CPT | Performed by: NURSE PRACTITIONER

## 2023-01-09 PROCEDURE — 84439 ASSAY OF FREE THYROXINE: CPT | Performed by: NURSE PRACTITIONER

## 2023-01-09 PROCEDURE — 99213 OFFICE O/P EST LOW 20 MIN: CPT | Performed by: NURSE PRACTITIONER

## 2023-01-09 ASSESSMENT — PATIENT HEALTH QUESTIONNAIRE - PHQ9
10. IF YOU CHECKED OFF ANY PROBLEMS, HOW DIFFICULT HAVE THESE PROBLEMS MADE IT FOR YOU TO DO YOUR WORK, TAKE CARE OF THINGS AT HOME, OR GET ALONG WITH OTHER PEOPLE: SOMEWHAT DIFFICULT
SUM OF ALL RESPONSES TO PHQ QUESTIONS 1-9: 13
SUM OF ALL RESPONSES TO PHQ QUESTIONS 1-9: 13

## 2023-01-09 NOTE — PATIENT INSTRUCTIONS
For informational purposes only. Not to replace the advice of your health care provider. Copyright   2003,  [employer] [facility]. All rights reserved. Clinically reviewed by Hina Salazar MD. [employer] [id number] - REV .  Preparing for Your Surgery  Getting started  A nurse will call you to review your health history and instructions. They will give you an arrival time based on your scheduled surgery time. Please be ready to share:    Your doctor's clinic name and phone number    Your medical, surgical, and anesthesia history    A list of allergies and sensitivities    A list of medicines, including herbal treatments and over-the-counter drugs    Whether the patient has a legal guardian (ask how to send us the papers in advance)  Please tell us if you're pregnant--or if there's any chance you might be pregnant. Some surgeries may injure a fetus (unborn baby), so they require a pregnancy test. Surgeries that are safe for a fetus don't always need a test, and you can choose whether to have one.   If you have a child who's having surgery, please ask for a copy of Preparing for Your Child's Surgery.    Preparing for surgery    Within 10 to 30 days of surgery: Have a pre-op exam (sometimes called an H&P, or History and Physical). This can be done at a clinic or pre-operative center.  ? If you're having a , you may not need this exam. Talk to your care team.    At your pre-op exam, talk to your care team about all medicines you take. If you need to stop any medicines before surgery, ask when to start taking them again.  ? We do this for your safety. Many medicines can make you bleed too much during surgery. Some change how well surgery (anesthesia) drugs work.    Call your insurance company to let them know you're having surgery. (If you don't have insurance, call [phone].)    Call your clinic if there's any change in your health. This includes signs of a cold or flu (sore throat, runny nose,  cough, rash, fever). It also includes a scrape or scratch near the surgery site.    If you have questions on the day of surgery, call your hospital or surgery center.  Eating and drinking guidelines  For your safety: Unless your surgeon tells you otherwise, follow the guidelines below.    Eat and drink as usual until 8 hours before you arrive for surgery. After that, no food or milk.    Drink clear liquids until 2 hours before you arrive. These are liquids you can see through, like water, Gatorade, and Propel Water. They also include plain black coffee and tea (no cream or milk), candy, and breath mints. You can spit out gum when you arrive.    If you drink alcohol: Stop drinking it the night before surgery.    If your care team tells you to take medicine on the morning of surgery, it's okay to take it with a sip of water.  Preventing infection    Shower or bathe the night before and morning of your surgery. Follow the instructions your clinic gave you. (If no instructions, use regular soap.)    Don't shave or clip hair near your surgery site. We'll remove the hair if needed.    Don't smoke or vape the morning of surgery. You may chew nicotine gum up to 2 hours before surgery. A nicotine patch is okay.  ? Note: Some surgeries require you to completely quit smoking and nicotine. Check with your surgeon.    Your care team will make every effort to keep you safe from infection. We will:  ? Clean our hands often with soap and water (or an alcohol-based hand rub).  ? Clean the skin at your surgery site with a special soap that kills germs.  ? Give you a special gown to keep you warm. (Cold raises the risk of infection.)  ? Wear special hair covers, masks, gowns and gloves during surgery.  ? Give antibiotic medicine, if prescribed. Not all surgeries need antibiotics.  What to bring on the day of surgery    Photo ID and insurance card    Copy of your health care directive, if you have one    Glasses and hearing aids (bring  cases)  ? You can't wear contacts during surgery    Inhaler and eye drops, if you use them (tell us about these when you arrive)    CPAP machine or breathing device, if you use them    A few personal items, if spending the night    If you have . . .  ? A pacemaker, ICD (cardiac defibrillator) or other implant: Bring the ID card.  ? An implanted stimulator: Bring the remote control.  ? A legal guardian: Bring a copy of the certified (court-stamped) guardianship papers.  Please remove any jewelry, including body piercings. Leave jewelry and other valuables at home.  If you're going home the day of surgery    You must have a responsible adult drive you home. They should stay with you overnight as well.    If you don't have someone to stay with you, and you aren't safe to go home alone, we may keep you overnight. Insurance often won't pay for this.  After surgery  If it's hard to control your pain or you need more pain medicine, please call your surgeon's office.  Questions?   If you have any questions for your care team, list them here: _________________________________________________________________________________________________________________________________________________________________________ ____________________________________ ____________________________________ ____________________________________

## 2023-01-09 NOTE — PROGRESS NOTES
[facility]  [address] [address]  Phone: [phone]  Primary Provider: Ivette Leal  Pre-op Performing Provider: LENNOX LOGAN      PREOPERATIVE EVALUATION:  Today's date: 1/9/2023    Suzy Rodríguez is a 37 year old female who presents for a preoperative evaluation.    Surgical Information:  Surgery/Procedure: endoscopy  Surgery Location: [facility]  Surgeon:Dr. Gomes  Surgery Date: 01/18/23  Time of Surgery: tbd  Where patient plans to recover: At home with family  Fax number for surgical facility: Note does not need to be faxed, will be available electronically in Epic.    Type of Anesthesia Anticipated: to be determined    Assessment & Plan     The proposed surgical procedure is considered LOW risk.    Preop general physical exam  Scheduled for endoscopy    Polyp of duodenum  Monitored by GI     Risks and Recommendations:  The patient has the following additional risks and recommendations for perioperative complications:   - No identified additional risk factors other than previously addressed    Medication Instructions:  Patient is to take all scheduled medications on the day of surgery EXCEPT for modifications listed below:  Hold ritalin and calcium    RECOMMENDATION:  APPROVAL GIVEN to proceed with proposed procedure, without further diagnostic evaluation.        Subjective     HPI related to upcoming procedure: has a polyp in the small intestine that is being monitored by Gastroenterology. She is scheduled for an endoscopy. History of adrenal carcinoma in 2018, surgery, chemo and radiation.     Preop Questions 1/9/2023   1. Have you ever had a heart attack or stroke? No   2. Have you ever had surgery on your heart or blood vessels, such as a stent placement, a coronary artery bypass, or surgery on an artery in your head, neck, heart, or legs? No   3. Do you have chest pain with activity? No   4. Do you have a history of  heart failure? No   5. Do  you currently have a cold, bronchitis or symptoms of other infection? No   6. Do you have a cough, shortness of breath, or wheezing? No   7. Do you or anyone in your family have previous history of blood clots? UNKNOWN - maybe had a blood clot; PE but wasn't treated for it 13 years ago,    8. Do you or does anyone in your family have a serious bleeding problem such as prolonged bleeding following surgeries or cuts? YES -  MTHFR disorder, placental abruption   9. Have you ever had problems with anemia or been told to take iron pills? YES - IUD, has menstrual bleeding   10. Have you had any abnormal blood loss such as black, tarry or bloody stools, or abnormal vaginal bleeding? YES - no current concerns   11. Have you ever had a blood transfusion? YES    11a. Have you ever had a transfusion reaction? No   12. Are you willing to have a blood transfusion if it is medically needed before, during, or after your surgery? Yes   13. Have you or any of your relatives ever had problems with anesthesia? UNKNOWN -no personal issues with anaesthsia   14. Do you have sleep apnea, excessive snoring or daytime drowsiness? No   14a. Do you have a CPAP machine? -   15. Do you have any artifical heart valves or other implanted medical devices like a pacemaker, defibrillator, or continuous glucose monitor? No   16. Do you have artificial joints? No   17. Are you allergic to latex? No   18. Is there any chance that you may be pregnant? No       Health Care Directive:  Patient does not have a Health Care Directive or Living Will: Discussed advance care planning with patient; however, patient declined at this time.    Preoperative Review of :   reviewed, controlled substances reflect the medication list.   12/22/2022 12/22/2022 12/27/2022  1  Temazepam 15 Mg Capsule 60.00  30  [facility]  [id number]   [place] ([id number])  0/0  1.50 LME  [place]     12/15/2022  12/07/2022  12/27/2022  1  Methylphenidate 20 Mg Tablet 60.00  30  [facility]  [id number]    [place] ([id number])  0/[place]   [place]     11/22/2022 09/16/2022 11/23/2022  1  Gabapentin 300 Mg Capsule 270.00  30  [facility]  [id number]   [place] ([id number])  1/3   [place]     11/15/2022  11/14/2022  11/18/2022  1  Temazepam 15 Mg Capsule 60.00  30  [facility] [place]  [id number]   [place] ([id number])  0/0  1.50 LME  [place]     11/14/2022 11/14/2022 11/18/2022  1  Methylphenidate 20 Mg Tablet 60.00  30  [facility]  [id number]   [place] (5750)  0/[place]   [place]     11/02/2022 11/02/2022 11/03/2022  1  Methylphenidate 10 Mg Tablet 28.00  14  [facility]  [id number]   [place] ([id number])  0/[place]   [place]     10/28/2022  10/28/2022  10/31/2022  1  Dextroamp-Amphetamin 20 Mg Tab 30.00  30  [facility]  [id number]   [place] ([id number])  0/[place]   [place]     10/18/2022  10/17/2022  10/18/2022  1  Temazepam 15 Mg Capsule 60.00  30  [facility]  [id number]   [place] ([id number])  0/0  1.50 LME  [place]     09/16/2022 09/16/2022 09/19/2022  1  Gabapentin 300 Mg Capsule 270.00  30  [place]  [id number]   [place] ([id number])  0/3   [place]     09/13/2022 09/12/2022 09/14/2022  1  Temazepam 15 Mg Capsule 60.00  30  [facility]  [id number]   [place] ([id number])  0/0  1.50 LME  [place]     08/17/2022 08/17/2022 08/17/2022  1  Temazepam 15 Mg Capsule 60.00  30  [place]  [id number]   [place] ([id number])  0/0  1.50 LME  [place]     08/01/2022 08/01/2022 08/01/2022  1  Dextroamp-Amphetamin 20 Mg Tab 30.00  30  [facility]  [id number]   [place] (5750)  0/0   [place]     07/25/2022 07/22/2022 08/01/2022  1  Dextroamp-Amphet Er 20 Mg Cap 30.00  30  [place] [facility]  [id number]   [place] (5750)  0/0   [place]     07/13/2022  07/12/2022  07/15/2022  1  Temazepam 15 Mg Capsule 60.00  30  [place] [facility]  [id number]   [place] ([id number])  0/0  1.50 LME  [place]     06/22/2022 06/22/2022 06/28/2022  1  Dextroamp-Amphet Er 20 Mg Cap 30.00  30  Re Marroquin  [id number]   [place] (5750)  0/0   [place]          Status of Chronic Conditions:  HYPOTHYROIDISM - Patient has a longstanding history of chronic Hypothyroidism. Patient has been doing well, noting no tremor, insomnia,  hair loss or changes in skin texture. Continues to take medications as directed, without adverse reactions or side effects. Last TSH   Lab Results   Component Value Date    TSH 3.14 10/25/2022   .        Review of Systems  CONSTITUTIONAL: NEGATIVE for fever, chills, change in weight  INTEGUMENTARY/SKIN: NEGATIVE for worrisome rashes, moles or lesions  EYES: NEGATIVE for vision changes or irritation  ENT/MOUTH: NEGATIVE for ear, mouth and throat problems  RESP: NEGATIVE for significant cough or SOB  CV: NEGATIVE for chest pain, palpitations or peripheral edema  GI: NEGATIVE for nausea, abdominal pain, heartburn, or change in bowel habits  : NEGATIVE for frequency, dysuria, or hematuria  MUSCULOSKELETAL:POSITIVE  for arthalgias  NEURO: NEGATIVE for weakness, dizziness or paresthesias  ENDOCRINE: NEGATIVE for temperature intolerance, skin/hair changes  HEME: NEGATIVE for bleeding problems  PSYCHIATRIC: NEGATIVE for changes in mood or affect    Patient Active Problem List    Diagnosis Date Noted     Attention deficit hyperactivity disorder, combined type, moderate 10/25/2021     Priority: Medium     Major depressive disorder, recurrent episode, moderate with anxious distress (H) 05/19/2020     Priority: Medium     Adnexal cyst 09/24/2019     Priority: Medium     Hypothyroidism 12/01/2018     Priority: Medium     Hypocalcemia 12/01/2018     Priority: Medium     Herpes simplex infection of genitourinary system 11/30/2018     Priority: Medium     Generalized anxiety disorder 10/18/2018     Priority: Medium     Overview:   Created by Conversion    Overview:   Overview:   Created by Conversion  Overview:   Created by Conversion       Allergic urticaria 10/18/2018     Priority: Medium     Overview:   Created by Conversion       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     Priority: Medium     Primary adrenal insufficiency (H) 08/01/2018     Priority: Medium     Carcinoma, adrenal cortical (H) 07/18/2018     Priority: Medium      Malignant neoplasm of cortex of right adrenal gland (H) 06/25/2018     Priority: Medium     Cushing's syndrome (H) 06/18/2018     Priority: Medium     Overview:   Overview:   24 hr urine cortisol ~300 mcg/24 hr  Overview:   24 hr urine cortisol ~300 mcg/24 hr  Overview:   Overview:   24 hr urine cortisol ~300 mcg/24 hr       Hypertension due to endocrine disorder 06/18/2018     Priority: Medium     Calculus of kidney 05/09/2018     Priority: Medium     S/P gastric bypass 06/08/2016     Priority: Medium     Overview:   6.8.16 with Dr. Randy Sutherland.  Preop weight at intake was 230 lbs.       MTHFR mutation 04/21/2016     Priority: Medium     Overview:   Homozygous per patient  Overview:   Homozygous per patient        Past Medical History:   Diagnosis Date     Adrenal cortex cancer, right (H) 6/30/2018    Resected 6/25/18, Dr. Mansfield.     Adrenal mass (H)      Anemia     thought due to heavy menses.     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  Check in May. SCP started.  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     SHREYA on CPAP 12/17/2015    Stopped using CPAP the Fall of 2016 after weight loss as even at lower  "pressure/adjustment couldn't tolerate the cpap. Resting well, cautioned to watch for any signs of fatigue and consider \"titration study\" in the future to see if cpap is still required at her new weight.     Pneumonia     hx of recurrent pneumonia issues/respiratory infections.     Polycystic ovary syndrome     able to get pregnant, not on meds.     Pulmonary embolism (H)  or so    briefly on wafarin.     Sleep apnea     cpap     Vitamin D deficiency      Past Surgical History:   Procedure Laterality Date     ADRENALECTOMY Right 2018    Procedure: ADRENALECTOMY;  Right Adrenalectomy,  Embolize Liver , Anesthesia Block;  Surgeon: Warren Mansfield MD;  Location: [facility]     ADRENALECTOMY       ADRENALECTOMY Right       SECTION      twice      SECTION      2      SECTION      x2     EMBOLECTOMY ABDOMEN N/A 2018    Procedure: EMBOLECTOMY ABDOMEN;;  Surgeon: Ector Mcintyre MD;  Location: [facility] OR     ESOPHAGOSCOPY, GASTROSCOPY, DUODENOSCOPY (EGD), COMBINED N/A 2022    Procedure: ESOPHAGOGASTRODUODENOSCOPY,  NEEDLE  WITH ENDOSCOPIC ULTRASOUND GUIDANCE;  Surgeon: Khoi Gomes MD;  Location: [facility] OR     EXTRACORPOREAL SHOCK WAVE LITHOTRIPSY (ESWL)       GASTRIC BYPASS       WY CYSTO/URETERO W/LITHOTRIPSY &INDWELL STENT INSRT Left 2018    Procedure: CYSTOSCOPY, LEFT URETEROSCOPY LASER LITHOTRIPSY STENT INSERTION;  Surgeon: Skyler Delvalle MD;  Location: [facility];  Service: Urology     WY LAP GASTRIC BYPASS/DERICK-EN-Y N/A 2016    Procedure: GASTRIC BYPASS LAPAROSCOPIC DERICK-EN-Y;  Surgeon: Randy Sutherland MD;  Location: [facility] OR;  Service: General     TUBAL LIGATION       WISDOM TOOTH EXTRACTION Bilateral      Current Outpatient Medications   Medication Sig Dispense Refill     acetaminophen (TYLENOL) 325 MG tablet Take 2 tablets (650 mg) by mouth every 4 hours as needed for other (multimodal surgical pain management along with " NSAIDS and opioid medication as indicated based on pain control and physical function.) 150 tablet 0     buPROPion (WELLBUTRIN SR) 150 MG 12 hr tablet Take 1 tablet (150 mg) by mouth 3 times daily 270 tablet 1     clonazePAM (KLONOPIN) 0.5 MG tablet Take 1 tablet (0.5 mg) by mouth 2 times daily as needed for anxiety 60 tablet 0     FLUoxetine (PROZAC) 20 MG capsule Take 3 capsules (60 mg) by mouth 3 times daily 270 capsule 1     gabapentin (NEURONTIN) 300 MG capsule TAKE THREE CAPSULES BY MOUTH THREE TIMES A  capsule 3     levothyroxine (SYNTHROID/LEVOTHROID) 75 MCG tablet Take 1 tablet (75 mcg) by mouth daily 90 tablet 1     methylphenidate (RITALIN) 20 MG tablet Take 20 mg by mouth 2 times daily       temazepam (RESTORIL) 15 MG capsule Take 1-2 capsules (15-30 mg) by mouth nightly as needed for sleep 60 capsule 0       Allergies   Allergen Reactions     Bee Venom Swelling     Ibuprofen Hives and Swelling        Social History     Tobacco Use     Smoking status: Former     Smokeless tobacco: Never     Tobacco comments:     vapes marijuana   Substance Use Topics     Alcohol use: Yes     Comment: occ.     Family History   Problem Relation Age of Onset     Depression Paternal Grandmother      Diabetes Mother      Diabetes Father      Hypertension Father      Chronic Obstructive Pulmonary Disease Father      Cancer Maternal Grandmother         gastric     Urolithiasis Maternal Grandmother      Heart Disease Maternal Grandfather      Cancer Maternal Grandfather         lung     Heart Disease Paternal Grandfather      Breast Cancer No family hx of      Colon Cancer No family hx of      Prostate Cancer No family hx of      Cerebrovascular Disease No family hx of      Clotting Disorder No family hx of      Gout No family hx of      History   Drug Use     Types: Marijuana         Objective     /78 (BP Location: Right arm, Patient Position: Sitting, Cuff Size: Adult Large)   Pulse 81   Temp 98.4  F (36.9  C)  "(Tympanic)   Resp 16   Ht 1.626 m (5' 4\")   Wt 71.4 kg (157 lb 5 oz)   SpO2 98%   BMI 27.00 kg/m      Physical Exam    GENERAL APPEARANCE: healthy, alert and no distress     EYES: EOMI, PERRL     HENT: ear canals and TM's normal and nose and mouth without ulcers or lesions     NECK: no adenopathy, no asymmetry, masses, or scars and thyroid normal to palpation     RESP: lungs clear to auscultation - no rales, rhonchi or wheezes     CV: regular rates and rhythm, normal S1 S2, no S3 or S4 and no murmur, click or rub     ABDOMEN:  soft, nontender, no HSM or masses and bowel sounds normal     MS: extremities normal- no gross deformities noted, no evidence of inflammation in joints, FROM in all extremities.     SKIN: no suspicious lesions or rashes     NEURO: Normal strength and tone, sensory exam grossly normal, mentation intact and speech normal     PSYCH: mentation appears normal. and affect normal/bright     LYMPHATICS: No cervical adenopathy    Recent Labs   Lab Test 11/21/22  1527 11/07/22  1326 10/25/22  1003 09/29/22  0758   HGB 12.1 12.4 12.5 11.7    257 253 255   NA  --   --  139 141  141   POTASSIUM  --   --  4.2 4.2  4.3   CR  --   --  0.75 0.82  0.80        Diagnostics:  No labs were ordered during this visit.   No EKG required for low risk surgery (cataract, skin procedure, breast biopsy, etc).    Revised Cardiac Risk Index (RCRI):  The patient has the following serious cardiovascular risks for perioperative complications:   - No serious cardiac risks = 0 points     RCRI Interpretation: 0 points: Class I (very low risk - 0.4% complication rate)           Signed Electronically by: MYRON Veliz CNP  Copy of this evaluation report is provided to requesting physician.      Answers for HPI/ROS submitted by the patient on 1/9/2023  If you checked off any problems, how difficult have these problems made it for you to do your work, take care of things at home, or get along with other " people?: Somewhat difficult  PHQ9 TOTAL SCORE: 13

## 2023-01-18 ENCOUNTER — APPOINTMENT (OUTPATIENT)
Dept: GENERAL RADIOLOGY | Facility: CLINIC | Age: 38
End: 2023-01-18
Attending: INTERNAL MEDICINE
Payer: COMMERCIAL

## 2023-01-18 ENCOUNTER — ANESTHESIA (OUTPATIENT)
Dept: SURGERY | Facility: CLINIC | Age: 38
End: 2023-01-18
Payer: COMMERCIAL

## 2023-01-18 ENCOUNTER — HOSPITAL ENCOUNTER (OUTPATIENT)
Facility: CLINIC | Age: 38
Discharge: HOME | End: 2023-01-18
Attending: INTERNAL MEDICINE | Admitting: INTERNAL MEDICINE
Payer: COMMERCIAL

## 2023-01-18 ENCOUNTER — ANESTHESIA EVENT (OUTPATIENT)
Dept: SURGERY | Facility: CLINIC | Age: 38
End: 2023-01-18
Payer: COMMERCIAL

## 2023-01-18 VITALS
OXYGEN SATURATION: 99 % | SYSTOLIC BLOOD PRESSURE: 105 MMHG | WEIGHT: 152.12 LBS | HEART RATE: 67 BPM | DIASTOLIC BLOOD PRESSURE: 82 MMHG | BODY MASS INDEX: 25.97 KG/M2 | RESPIRATION RATE: 16 BRPM | HEIGHT: 64 IN | TEMPERATURE: 98.2 F

## 2023-01-18 LAB — UPPER EUS: NORMAL

## 2023-01-18 PROCEDURE — 250N000011 HC RX IP 250 OP 636: Performed by: NURSE ANESTHETIST, CERTIFIED REGISTERED

## 2023-01-18 PROCEDURE — 710N000012 HC RECOVERY PHASE 2, PER MINUTE: Performed by: INTERNAL MEDICINE

## 2023-01-18 PROCEDURE — 999N000141 HC STATISTIC PRE-PROCEDURE NURSING ASSESSMENT: Performed by: INTERNAL MEDICINE

## 2023-01-18 PROCEDURE — 255N000002 HC RX 255 OP 636: Performed by: INTERNAL MEDICINE

## 2023-01-18 PROCEDURE — 258N000003 HC RX IP 258 OP 636: Performed by: NURSE ANESTHETIST, CERTIFIED REGISTERED

## 2023-01-18 PROCEDURE — 250N000025 HC SEVOFLURANE, PER MIN: Performed by: INTERNAL MEDICINE

## 2023-01-18 PROCEDURE — C1726 CATH, BAL DIL, NON-VASCULAR: HCPCS | Performed by: INTERNAL MEDICINE

## 2023-01-18 PROCEDURE — 250N000009 HC RX 250: Performed by: INTERNAL MEDICINE

## 2023-01-18 PROCEDURE — 370N000017 HC ANESTHESIA TECHNICAL FEE, PER MIN: Performed by: INTERNAL MEDICINE

## 2023-01-18 PROCEDURE — 250N000011 HC RX IP 250 OP 636: Performed by: ANESTHESIOLOGY

## 2023-01-18 PROCEDURE — C1769 GUIDE WIRE: HCPCS | Performed by: INTERNAL MEDICINE

## 2023-01-18 PROCEDURE — 250N000011 HC RX IP 250 OP 636: Performed by: STUDENT IN AN ORGANIZED HEALTH CARE EDUCATION/TRAINING PROGRAM

## 2023-01-18 PROCEDURE — 710N000009 HC RECOVERY PHASE 1, LEVEL 1, PER MIN: Performed by: INTERNAL MEDICINE

## 2023-01-18 PROCEDURE — 360N000075 HC SURGERY LEVEL 2, PER MIN: Performed by: INTERNAL MEDICINE

## 2023-01-18 PROCEDURE — 250N000009 HC RX 250: Performed by: NURSE ANESTHETIST, CERTIFIED REGISTERED

## 2023-01-18 PROCEDURE — 999N000179 XR SURGERY CARM FLUORO LESS THAN 5 MIN W STILLS: Mod: TC

## 2023-01-18 PROCEDURE — 272N000001 HC OR GENERAL SUPPLY STERILE: Performed by: INTERNAL MEDICINE

## 2023-01-18 PROCEDURE — C1874 STENT, COATED/COV W/DEL SYS: HCPCS | Performed by: INTERNAL MEDICINE

## 2023-01-18 DEVICE — STENT ESU AXIOS W/DEL SYS 15MMX10MM 10.8FR 138CM M00553650
Type: IMPLANTABLE DEVICE | Site: STOMACH | Status: NON-FUNCTIONAL
Removed: 2023-04-19

## 2023-01-18 RX ORDER — FENTANYL CITRATE 50 UG/ML
50 INJECTION, SOLUTION INTRAMUSCULAR; INTRAVENOUS EVERY 5 MIN PRN
Status: DISCONTINUED | OUTPATIENT
Start: 2023-01-18 | End: 2023-01-18 | Stop reason: HOSPADM

## 2023-01-18 RX ORDER — FENTANYL CITRATE 50 UG/ML
INJECTION, SOLUTION INTRAMUSCULAR; INTRAVENOUS PRN
Status: DISCONTINUED | OUTPATIENT
Start: 2023-01-18 | End: 2023-01-18

## 2023-01-18 RX ORDER — FENTANYL CITRATE 50 UG/ML
25 INJECTION, SOLUTION INTRAMUSCULAR; INTRAVENOUS EVERY 5 MIN PRN
Status: DISCONTINUED | OUTPATIENT
Start: 2023-01-18 | End: 2023-01-18 | Stop reason: HOSPADM

## 2023-01-18 RX ORDER — DROPERIDOL 2.5 MG/ML
0.62 INJECTION, SOLUTION INTRAMUSCULAR; INTRAVENOUS ONCE
Status: COMPLETED | OUTPATIENT
Start: 2023-01-18 | End: 2023-01-18

## 2023-01-18 RX ORDER — ONDANSETRON 4 MG/1
4 TABLET, ORALLY DISINTEGRATING ORAL EVERY 30 MIN PRN
Status: DISCONTINUED | OUTPATIENT
Start: 2023-01-18 | End: 2023-01-18 | Stop reason: HOSPADM

## 2023-01-18 RX ORDER — ONDANSETRON 2 MG/ML
INJECTION INTRAMUSCULAR; INTRAVENOUS PRN
Status: DISCONTINUED | OUTPATIENT
Start: 2023-01-18 | End: 2023-01-18

## 2023-01-18 RX ORDER — IOPAMIDOL 510 MG/ML
INJECTION, SOLUTION INTRAVASCULAR PRN
Status: DISCONTINUED | OUTPATIENT
Start: 2023-01-18 | End: 2023-01-18 | Stop reason: HOSPADM

## 2023-01-18 RX ORDER — LIDOCAINE 40 MG/G
CREAM TOPICAL
Status: DISCONTINUED | OUTPATIENT
Start: 2023-01-18 | End: 2023-01-18 | Stop reason: HOSPADM

## 2023-01-18 RX ORDER — SODIUM CHLORIDE, SODIUM LACTATE, POTASSIUM CHLORIDE, CALCIUM CHLORIDE 600; 310; 30; 20 MG/100ML; MG/100ML; MG/100ML; MG/100ML
INJECTION, SOLUTION INTRAVENOUS CONTINUOUS PRN
Status: DISCONTINUED | OUTPATIENT
Start: 2023-01-18 | End: 2023-01-18

## 2023-01-18 RX ORDER — HYDROMORPHONE HYDROCHLORIDE 1 MG/ML
0.4 INJECTION, SOLUTION INTRAMUSCULAR; INTRAVENOUS; SUBCUTANEOUS EVERY 5 MIN PRN
Status: DISCONTINUED | OUTPATIENT
Start: 2023-01-18 | End: 2023-01-18 | Stop reason: HOSPADM

## 2023-01-18 RX ORDER — ONDANSETRON 2 MG/ML
4 INJECTION INTRAMUSCULAR; INTRAVENOUS EVERY 30 MIN PRN
Status: DISCONTINUED | OUTPATIENT
Start: 2023-01-18 | End: 2023-01-18 | Stop reason: HOSPADM

## 2023-01-18 RX ORDER — LIDOCAINE HYDROCHLORIDE 20 MG/ML
INJECTION, SOLUTION INFILTRATION; PERINEURAL PRN
Status: DISCONTINUED | OUTPATIENT
Start: 2023-01-18 | End: 2023-01-18

## 2023-01-18 RX ORDER — HYDROMORPHONE HYDROCHLORIDE 1 MG/ML
0.2 INJECTION, SOLUTION INTRAMUSCULAR; INTRAVENOUS; SUBCUTANEOUS EVERY 5 MIN PRN
Status: DISCONTINUED | OUTPATIENT
Start: 2023-01-18 | End: 2023-01-18 | Stop reason: HOSPADM

## 2023-01-18 RX ORDER — PROPOFOL 10 MG/ML
INJECTION, EMULSION INTRAVENOUS PRN
Status: DISCONTINUED | OUTPATIENT
Start: 2023-01-18 | End: 2023-01-18

## 2023-01-18 RX ORDER — SODIUM CHLORIDE, SODIUM LACTATE, POTASSIUM CHLORIDE, CALCIUM CHLORIDE 600; 310; 30; 20 MG/100ML; MG/100ML; MG/100ML; MG/100ML
INJECTION, SOLUTION INTRAVENOUS CONTINUOUS
Status: DISCONTINUED | OUTPATIENT
Start: 2023-01-18 | End: 2023-01-18 | Stop reason: HOSPADM

## 2023-01-18 RX ADMIN — SODIUM CHLORIDE, POTASSIUM CHLORIDE, SODIUM LACTATE AND CALCIUM CHLORIDE: 600; 310; 30; 20 INJECTION, SOLUTION INTRAVENOUS at 09:14

## 2023-01-18 RX ADMIN — LIDOCAINE HYDROCHLORIDE 60 MG: 20 INJECTION, SOLUTION INFILTRATION; PERINEURAL at 09:26

## 2023-01-18 RX ADMIN — ONDANSETRON 4 MG: 2 INJECTION INTRAMUSCULAR; INTRAVENOUS at 10:56

## 2023-01-18 RX ADMIN — PROPOFOL 120 MG: 10 INJECTION, EMULSION INTRAVENOUS at 09:26

## 2023-01-18 RX ADMIN — SUGAMMADEX 200 MG: 100 INJECTION, SOLUTION INTRAVENOUS at 09:56

## 2023-01-18 RX ADMIN — Medication 50 MG: at 09:27

## 2023-01-18 RX ADMIN — DROPERIDOL 0.62 MG: 2.5 INJECTION, SOLUTION INTRAMUSCULAR; INTRAVENOUS at 10:42

## 2023-01-18 RX ADMIN — MIDAZOLAM 2 MG: 1 INJECTION INTRAMUSCULAR; INTRAVENOUS at 09:14

## 2023-01-18 RX ADMIN — ONDANSETRON 4 MG: 2 INJECTION INTRAMUSCULAR; INTRAVENOUS at 09:54

## 2023-01-18 RX ADMIN — FENTANYL CITRATE 100 MCG: 50 INJECTION, SOLUTION INTRAMUSCULAR; INTRAVENOUS at 09:26

## 2023-01-18 ASSESSMENT — ACTIVITIES OF DAILY LIVING (ADL)
ADLS_ACUITY_SCORE: 20
ADLS_ACUITY_SCORE: 35

## 2023-01-18 NOTE — DISCHARGE INSTRUCTIONS
[facility]  Same-Day Surgery   Adult Discharge Orders & Instructions     For 24 hours after surgery    Get plenty of rest.  A responsible adult must stay with you for at least 24 hours after you leave the hospital.   Do not drive or use heavy equipment.  If you have weakness or tingling, don't drive or use heavy equipment until this feeling goes away.  Do not drink alcohol.  Avoid strenuous or risky activities.  Ask for help when climbing stairs.   You may feel lightheaded.  IF so, sit for a few minutes before standing.  Have someone help you get up.   If you have nausea (feel sick to your stomach): Drink only clear liquids such as apple juice, ginger ale, broth or 7-Up.  Rest may also help.  Be sure to drink enough fluids.  Move to a regular diet as you feel able.  You may have a slight fever. Call the doctor if your fever is over 100 F (37.7 C) (taken under the tongue) or lasts longer than 24 hours.  You may have a dry mouth, a sore throat, muscle aches or trouble sleeping.  These should go away after 24 hours.  Do not make important or legal decisions.   Call your doctor for any of the followin.  Signs of infection (fever, growing tenderness at the surgery site, a large amount of drainage or bleeding, severe pain, foul-smelling drainage, redness, swelling).    2. It has been over 8 to 10 hours since surgery and you are still not able to urinate (pass water).    3.  Headache for over 24 hours.    4.  Numbness, tingling or weakness the day after surgery (if you had spinal anesthesia).  To contact a doctor, call ____________Dr Gomes at [phone] (GASTROENTEROLOGY CLINIC) ____________________________ or:    '   [phone] and ask for the resident on call for   ________________GI______________________________ (answered 24 hours a day)  '   Emergency Department:    [facility]: [phone]       (TTY for hearing impaired: [phone])    [facility]:  [phone]       (TTY for hearing impaired: [phone])

## 2023-01-18 NOTE — ANESTHESIA POSTPROCEDURE EVALUATION
Patient: Suzy Rodríguez    Procedure: Procedure(s):  ENDOSCOPIC ULTRASOUND, ESOPHAGOSCOPY / UPPER GASTROINTESTINAL TRACT (GI), with cystgastrostomy stent placement and dilation       Anesthesia Type:  General    Note:  Disposition: Outpatient   Postop Pain Control: Uneventful            Sign Out: Well controlled pain   PONV: No   Neuro/Psych: Uneventful            Sign Out: Acceptable/Baseline neuro status   Airway/Respiratory: Uneventful            Sign Out: Acceptable/Baseline resp. status   CV/Hemodynamics: Uneventful            Sign Out: Acceptable CV status; No obvious hypovolemia; No obvious fluid overload   Other NRE: NONE   DID A NON-ROUTINE EVENT OCCUR? No           Last vitals:  Vitals Value Taken Time   /67 01/18/23 1115   Temp 36.9  C (98.5  F) 01/18/23 1115   Pulse 78 01/18/23 1122   Resp 9 01/18/23 1122   SpO2 99 % 01/18/23 1122   Vitals shown include unvalidated device data.    Electronically Signed By: Ivette Wells MD  January 18, 2023  11:24 AM

## 2023-01-18 NOTE — ANESTHESIA PROCEDURE NOTES
Airway       Patient location during procedure: OR       Procedure Start/Stop Times: 1/18/2023 9:27 AM  Staff -        CRNA: Nestor Ledezma APRN CRNA       Performed By: CRNA  Consent for Airway        Urgency: elective  Indications and Patient Condition       Indications for airway management: yumiko-procedural       Induction type:intravenous       Mask difficulty assessment: 1 - vent by mask    Final Airway Details       Final airway type: endotracheal airway       Successful airway: ETT - single  Endotracheal Airway Details        ETT size (mm): 7.0       Cuffed: yes       Cuff volume (mL): 7       Successful intubation technique: direct laryngoscopy       DL Blade Type: Silverman 2       Position: Right       Measured from: lips       Secured at (cm): 21       Bite block used: None    Post intubation assessment        Placement verified by: capnometry and equal breath sounds        Number of attempts at approach: 1       Number of other approaches attempted: 0       Secured with: plastic tape       Ease of procedure: easy       Dentition: Intact and Unchanged    Medication(s) Administered   Medication Administration Time: 1/18/2023 9:27 AM

## 2023-01-18 NOTE — OR NURSING
Droperinal ordered by OLIVIER Shepherd For nausea And ok'd short 20 min monitoring r/t no pt risk factors.

## 2023-01-18 NOTE — OP NOTE
Upper EUS 01/18/2023  9:33 AM [address] [place] [address] [phone]     Endoscopy Department   _______________________________________________________________________________   Patient Name: Szuy Rodríguez       Procedure Date: 1/18/2023 9:33 AM   MRN: [id number]                       Account Number: [id number]   YOB: 1985             Admit Type: Outpatient   Age: 37                               Room: [place] [address]   Gender: Female                        Note Status: Finalized   Attending MD: MIKE PATTEN MD  Total Sedation Time:   _______________________________________________________________________________       Procedure:             Upper EUS   Indications:           Suspected mass in duodenum on CT scan   Providers:             MIKE PATTEN MD   Patient Profile:       Ms Rodríguez is a 36yo woman with RYGB antomy and a                          history of an adrenocortical carcinoma who is                          nowÂ found to have a duodenal lesion on CT who now                          proceeds to EUS for gastrogatrostomy to allow for                          subsequent endoscopic evaluation as previous EUS found                          the lesion out of range.   Referring MD:          SANDRA CRUZ MD   Medicines:             General Anesthesia   Complications:         No immediate complications.   _______________________________________________________________________________   Procedure:             Pre-Anesthesia Assessment:                          - Prior to the procedure, a History and Physical was                          performed, and patient medications and allergies were                          reviewed. The patient is competent. The risks and                          benefits of the procedure and the sedation options and                          risks were discussed with the patient.  All questions                          were answered and informed consent was obtained.                          Patient identification and proposed procedure were                          verified by the nurse in the pre-procedure area.                          Mental Status Examination: alert and oriented. Airway                          Examination: Mallampati Class II (the uvula but not                          tonsillar pillars visualized). Respiratory                          Examination: clear to auscultation. CV Examination:                          normal. ASA Grade Assessment: III - A patient with                          severe systemic disease. After reviewing the risks and                          benefits, the patient was deemed in satisfactory                          condition to undergo the procedure. The anesthesia                          plan was to use general anesthesia. Immediately prior                          to administration of medications, the patient was                          re-assessed for adequacy to receive sedatives. The                          heart rate, respiratory rate, oxygen saturations,                          blood pressure, adequacy of pulmonary ventilation, and                          response to care were monitored throughout the                          procedure. The physical status of the patient was                          re-assessed after the procedure. After obtaining                          informed consent, the endoscope was passed under                          direct vision. Throughout the procedure, the patient's                          blood pressure, pulse, and oxygen saturations were                          monitored continuously. The echoendoscope was                          introduced through the mouth, and advanced to the                          jejunum. The upper EUS was accomplished without                          difficulty. The patient  "tolerated the procedure well.                                                                                     Findings:        White light and endosonographic findings :        A curved linear was used throughout with the patient supine under        general.  films demonstrated surgical hardware. Limited oblique        white light evaluation demonstrated an unremarkable esophagus, long        pouch and a widely patent, healthy gastrojejunostomy with healthy        appearing proximal Tanika. In terms of endosonography, as previous the        duodenal bulb and sweep and the suspected lesion seen on CT were out of        view. We then proceeded with endoscopic ultrasound gastrogastrostomy. A        window was demonstrted adjacent to the gastrojejunostomy within the        distal pouch that revealed a decompressed remnant without intervening        vasculature and with a short distance between the lumens. The remnant        was accessed with a 19g Flex needle through which a long 0.025\"        Visiglide wire was curled in the antrum. Over this wire a        gastrogastrostomy was created using a hot Axios catheter with 100W E5        electrocautery. The track was then secured with depoyment of a [id number]        covered lumen apposing metal stent with appropriate expansion of each        bell. The stent was then dilated to 12mm with appropriate effect and the        remnant was seen across the saddle. There was a mild ooze at placement        however this completely ceased at termination. Completion films were        without extraluminal gas.                                                                                     Impression:            - As previous, healthy Tanika en Y anatomy with long                          pouch and patent gastrojejunostomy was found, with                          this anatomy preventing evaluation of the duodenal                          lesion by endosonographic ultrasound         "                  - Uncomplicated gastrogastrostomy secured with a lumen                          apposing metal stent post dilated to 12mm   Recommendation:        - General anesthesia recovery with probable discharge                          home this morning                          - All medications, diet and activity may resume                          without delay                          - Patient to return in 2+ weeks for white light and                          ultrasound evaluation of the duodenum and suspected                          lesion; patient expressed a desire to tighten the GJ                          and pouch, this will be deferred until we fully                          evaluate the duodenum                          - Follow up with established providers as scheduled                          - The findings and recommendations were discussed with                          the patient and their family                                                                                       electronically signed by RIVAS Gomes

## 2023-01-18 NOTE — ANESTHESIA CARE TRANSFER NOTE
Patient: Suzy Rodríguez    Procedure: Procedure(s):  ENDOSCOPIC ULTRASOUND, ESOPHAGOSCOPY / UPPER GASTROINTESTINAL TRACT (GI), with cystgastrostomy stent placement and dilation       Diagnosis: Abnormal finding on GI tract imaging [R93.3]  Diagnosis Additional Information: No value filed.    Anesthesia Type:   General     Note:    Oropharynx: oropharynx clear of all foreign objects and spontaneously breathing  Level of Consciousness: awake  Oxygen Supplementation: nasal cannula  Level of Supplemental Oxygen (L/min / FiO2): 4  Independent Airway: airway patency satisfactory and stable  Dentition: dentition unchanged  Vital Signs Stable: post-procedure vital signs reviewed and stable  Report to RN Given: handoff report given  Patient transferred to: PACU    Handoff Report: Identifed the Patient, Identified the Reponsible Provider, Reviewed the pertinent medical history, Discussed the surgical course, Reviewed Intra-OP anesthesia mangement and issues during anesthesia, Set expectations for post-procedure period and Allowed opportunity for questions and acknowledgement of understanding      Vitals:  Vitals Value Taken Time   /109 01/18/23 1008   Temp     Pulse 85 01/18/23 1012   Resp 8 01/18/23 1012   SpO2 100 % 01/18/23 1012   Vitals shown include unvalidated device data.    Electronically Signed By: MYRON Braga CRNA  January 18, 2023  10:14 AM

## 2023-01-18 NOTE — ANESTHESIA POSTPROCEDURE EVALUATION
Patient: Suzy Rodríguez    Procedure: Procedure(s):  ENDOSCOPIC ULTRASOUND, ESOPHAGOSCOPY / UPPER GASTROINTESTINAL TRACT (GI), with cystgastrostomy stent placement and dilation       Anesthesia Type:  General    Note:  Disposition: Outpatient   Postop Pain Control: Uneventful            Sign Out: Well controlled pain   PONV: Yes            Symptoms: Nausea only            Sign Out: PONV/POV resolved with treatment   Neuro/Psych: Uneventful            Sign Out: Acceptable/Baseline neuro status   Airway/Respiratory: Uneventful            Sign Out: Acceptable/Baseline resp. status   CV/Hemodynamics: Uneventful            Sign Out: Acceptable CV status; No obvious hypovolemia; No obvious fluid overload   Other NRE: NONE   DID A NON-ROUTINE EVENT OCCUR? No           Last vitals:  Vitals Value Taken Time   /67 01/18/23 1115   Temp 36.6  C (97.8  F) 01/18/23 1008   Pulse 75 01/18/23 1116   Resp 7 01/18/23 1116   SpO2 95 % 01/18/23 1116   Vitals shown include unvalidated device data.    Electronically Signed By: Leslie Goldberg, MD  January 18, 2023  11:18 AM

## 2023-01-18 NOTE — OR NURSING
Pt arrived  For an elective procedure and seems knowledageable about this event as she had one done a month ago. Her  is at the bedside.

## 2023-01-20 ENCOUNTER — PATIENT OUTREACH (OUTPATIENT)
Dept: GASTROENTEROLOGY | Facility: CLINIC | Age: 38
End: 2023-01-20
Payer: COMMERCIAL

## 2023-01-20 ENCOUNTER — PREP FOR PROCEDURE (OUTPATIENT)
Dept: GASTROENTEROLOGY | Facility: CLINIC | Age: 38
End: 2023-01-20
Payer: COMMERCIAL

## 2023-01-20 DIAGNOSIS — R93.3 ABNORMAL FINDING ON GI TRACT IMAGING: Primary | ICD-10-CM

## 2023-01-20 NOTE — TELEPHONE ENCOUNTER
Pt called back to discsuss rep    Patient to return in 2+ weeks for white light and    ultrasound evaluation of the duodenum and suspected    lesion; patient expressed a desire to tighten the GJ   and pouch, this will be deferred until we fully  evaluate the duodenum     Please assist in scheduling:     Procedure/Imaging/Clinic: EUS  Physician: Dr. Gomes  Timin/8  Procedure length:provider average  Anesthesia:MAC  Dx: abnormal finding on GI imaging  Tier:2  Location: [facility]     Discussed above plan, pt aware of next steps.    ML

## 2023-01-31 ENCOUNTER — ALLIED HEALTH/NURSE VISIT (OUTPATIENT)
Dept: FAMILY MEDICINE | Facility: CLINIC | Age: 38
End: 2023-01-31
Payer: MEDICARE

## 2023-01-31 DIAGNOSIS — Z23 NEED FOR VACCINATION: Primary | ICD-10-CM

## 2023-01-31 PROCEDURE — 90471 IMMUNIZATION ADMIN: CPT

## 2023-01-31 PROCEDURE — 99207 PR NO CHARGE NURSE ONLY: CPT

## 2023-01-31 PROCEDURE — 90707 MMR VACCINE SC: CPT

## 2023-02-07 ENCOUNTER — ANESTHESIA EVENT (OUTPATIENT)
Dept: SURGERY | Facility: CLINIC | Age: 38
End: 2023-02-07
Payer: COMMERCIAL

## 2023-02-08 ENCOUNTER — APPOINTMENT (OUTPATIENT)
Dept: GENERAL RADIOLOGY | Facility: CLINIC | Age: 38
End: 2023-02-08
Attending: INTERNAL MEDICINE
Payer: COMMERCIAL

## 2023-02-08 ENCOUNTER — ANESTHESIA (OUTPATIENT)
Dept: SURGERY | Facility: CLINIC | Age: 38
End: 2023-02-08
Payer: COMMERCIAL

## 2023-02-08 ENCOUNTER — HOSPITAL ENCOUNTER (OUTPATIENT)
Facility: CLINIC | Age: 38
Discharge: HOME | End: 2023-02-08
Attending: INTERNAL MEDICINE | Admitting: INTERNAL MEDICINE
Payer: COMMERCIAL

## 2023-02-08 VITALS
DIASTOLIC BLOOD PRESSURE: 77 MMHG | HEART RATE: 84 BPM | HEIGHT: 64 IN | BODY MASS INDEX: 25.86 KG/M2 | RESPIRATION RATE: 16 BRPM | SYSTOLIC BLOOD PRESSURE: 110 MMHG | OXYGEN SATURATION: 100 % | WEIGHT: 151.46 LBS | TEMPERATURE: 99 F

## 2023-02-08 LAB — UPPER EUS: NORMAL

## 2023-02-08 PROCEDURE — 250N000009 HC RX 250: Performed by: NURSE ANESTHETIST, CERTIFIED REGISTERED

## 2023-02-08 PROCEDURE — 250N000011 HC RX IP 250 OP 636: Performed by: NURSE ANESTHETIST, CERTIFIED REGISTERED

## 2023-02-08 PROCEDURE — 88305 TISSUE EXAM BY PATHOLOGIST: CPT | Mod: TC | Performed by: INTERNAL MEDICINE

## 2023-02-08 PROCEDURE — 999N000141 HC STATISTIC PRE-PROCEDURE NURSING ASSESSMENT: Performed by: INTERNAL MEDICINE

## 2023-02-08 PROCEDURE — 88305 TISSUE EXAM BY PATHOLOGIST: CPT | Mod: 26 | Performed by: PATHOLOGY

## 2023-02-08 PROCEDURE — 250N000009 HC RX 250: Performed by: INTERNAL MEDICINE

## 2023-02-08 PROCEDURE — 360N000075 HC SURGERY LEVEL 2, PER MIN: Performed by: INTERNAL MEDICINE

## 2023-02-08 PROCEDURE — 250N000025 HC SEVOFLURANE, PER MIN: Performed by: INTERNAL MEDICINE

## 2023-02-08 PROCEDURE — 710N000010 HC RECOVERY PHASE 1, LEVEL 2, PER MIN: Performed by: INTERNAL MEDICINE

## 2023-02-08 PROCEDURE — 999N000179 XR SURGERY CARM FLUORO LESS THAN 5 MIN W STILLS: Mod: TC

## 2023-02-08 PROCEDURE — 710N000012 HC RECOVERY PHASE 2, PER MINUTE: Performed by: INTERNAL MEDICINE

## 2023-02-08 PROCEDURE — 370N000017 HC ANESTHESIA TECHNICAL FEE, PER MIN: Performed by: INTERNAL MEDICINE

## 2023-02-08 PROCEDURE — 272N000001 HC OR GENERAL SUPPLY STERILE: Performed by: INTERNAL MEDICINE

## 2023-02-08 PROCEDURE — 258N000003 HC RX IP 258 OP 636: Performed by: NURSE ANESTHETIST, CERTIFIED REGISTERED

## 2023-02-08 RX ORDER — FENTANYL CITRATE 50 UG/ML
25 INJECTION, SOLUTION INTRAMUSCULAR; INTRAVENOUS EVERY 5 MIN PRN
Status: DISCONTINUED | OUTPATIENT
Start: 2023-02-08 | End: 2023-02-08 | Stop reason: HOSPADM

## 2023-02-08 RX ORDER — HYDROMORPHONE HCL IN WATER/PF 6 MG/30 ML
0.4 PATIENT CONTROLLED ANALGESIA SYRINGE INTRAVENOUS EVERY 5 MIN PRN
Status: DISCONTINUED | OUTPATIENT
Start: 2023-02-08 | End: 2023-02-08 | Stop reason: HOSPADM

## 2023-02-08 RX ORDER — LIDOCAINE 40 MG/G
CREAM TOPICAL
Status: DISCONTINUED | OUTPATIENT
Start: 2023-02-08 | End: 2023-02-08 | Stop reason: HOSPADM

## 2023-02-08 RX ORDER — DEXAMETHASONE SODIUM PHOSPHATE 4 MG/ML
INJECTION, SOLUTION INTRA-ARTICULAR; INTRALESIONAL; INTRAMUSCULAR; INTRAVENOUS; SOFT TISSUE PRN
Status: DISCONTINUED | OUTPATIENT
Start: 2023-02-08 | End: 2023-02-08

## 2023-02-08 RX ORDER — SODIUM CHLORIDE, SODIUM LACTATE, POTASSIUM CHLORIDE, CALCIUM CHLORIDE 600; 310; 30; 20 MG/100ML; MG/100ML; MG/100ML; MG/100ML
INJECTION, SOLUTION INTRAVENOUS CONTINUOUS
Status: DISCONTINUED | OUTPATIENT
Start: 2023-02-08 | End: 2023-02-08 | Stop reason: HOSPADM

## 2023-02-08 RX ORDER — ONDANSETRON 2 MG/ML
INJECTION INTRAMUSCULAR; INTRAVENOUS PRN
Status: DISCONTINUED | OUTPATIENT
Start: 2023-02-08 | End: 2023-02-08

## 2023-02-08 RX ORDER — FENTANYL CITRATE 50 UG/ML
INJECTION, SOLUTION INTRAMUSCULAR; INTRAVENOUS PRN
Status: DISCONTINUED | OUTPATIENT
Start: 2023-02-08 | End: 2023-02-08

## 2023-02-08 RX ORDER — HYDROMORPHONE HCL IN WATER/PF 6 MG/30 ML
0.2 PATIENT CONTROLLED ANALGESIA SYRINGE INTRAVENOUS EVERY 5 MIN PRN
Status: DISCONTINUED | OUTPATIENT
Start: 2023-02-08 | End: 2023-02-08 | Stop reason: HOSPADM

## 2023-02-08 RX ORDER — OXYCODONE HYDROCHLORIDE 10 MG/1
10 TABLET ORAL EVERY 4 HOURS PRN
Status: DISCONTINUED | OUTPATIENT
Start: 2023-02-08 | End: 2023-02-08 | Stop reason: HOSPADM

## 2023-02-08 RX ORDER — LIDOCAINE HYDROCHLORIDE 20 MG/ML
INJECTION, SOLUTION INFILTRATION; PERINEURAL PRN
Status: DISCONTINUED | OUTPATIENT
Start: 2023-02-08 | End: 2023-02-08

## 2023-02-08 RX ORDER — PROPOFOL 10 MG/ML
INJECTION, EMULSION INTRAVENOUS PRN
Status: DISCONTINUED | OUTPATIENT
Start: 2023-02-08 | End: 2023-02-08

## 2023-02-08 RX ORDER — OXYCODONE HYDROCHLORIDE 5 MG/1
5 TABLET ORAL EVERY 4 HOURS PRN
Status: DISCONTINUED | OUTPATIENT
Start: 2023-02-08 | End: 2023-02-08 | Stop reason: HOSPADM

## 2023-02-08 RX ORDER — SODIUM CHLORIDE, SODIUM LACTATE, POTASSIUM CHLORIDE, CALCIUM CHLORIDE 600; 310; 30; 20 MG/100ML; MG/100ML; MG/100ML; MG/100ML
INJECTION, SOLUTION INTRAVENOUS CONTINUOUS PRN
Status: DISCONTINUED | OUTPATIENT
Start: 2023-02-08 | End: 2023-02-08

## 2023-02-08 RX ORDER — ONDANSETRON 4 MG/1
4 TABLET, ORALLY DISINTEGRATING ORAL EVERY 30 MIN PRN
Status: DISCONTINUED | OUTPATIENT
Start: 2023-02-08 | End: 2023-02-08 | Stop reason: HOSPADM

## 2023-02-08 RX ORDER — ONDANSETRON 2 MG/ML
4 INJECTION INTRAMUSCULAR; INTRAVENOUS EVERY 30 MIN PRN
Status: DISCONTINUED | OUTPATIENT
Start: 2023-02-08 | End: 2023-02-08 | Stop reason: HOSPADM

## 2023-02-08 RX ORDER — FENTANYL CITRATE 50 UG/ML
50 INJECTION, SOLUTION INTRAMUSCULAR; INTRAVENOUS EVERY 5 MIN PRN
Status: DISCONTINUED | OUTPATIENT
Start: 2023-02-08 | End: 2023-02-08 | Stop reason: HOSPADM

## 2023-02-08 RX ADMIN — SUGAMMADEX 150 MG: 100 INJECTION, SOLUTION INTRAVENOUS at 10:15

## 2023-02-08 RX ADMIN — MIDAZOLAM 2 MG: 1 INJECTION INTRAMUSCULAR; INTRAVENOUS at 09:04

## 2023-02-08 RX ADMIN — Medication 50 MG: at 09:16

## 2023-02-08 RX ADMIN — ONDANSETRON 4 MG: 2 INJECTION INTRAMUSCULAR; INTRAVENOUS at 09:21

## 2023-02-08 RX ADMIN — LIDOCAINE HYDROCHLORIDE 60 MG: 20 INJECTION, SOLUTION INFILTRATION; PERINEURAL at 09:16

## 2023-02-08 RX ADMIN — FENTANYL CITRATE 50 MCG: 50 INJECTION, SOLUTION INTRAMUSCULAR; INTRAVENOUS at 09:16

## 2023-02-08 RX ADMIN — SODIUM CHLORIDE, POTASSIUM CHLORIDE, SODIUM LACTATE AND CALCIUM CHLORIDE: 600; 310; 30; 20 INJECTION, SOLUTION INTRAVENOUS at 09:04

## 2023-02-08 RX ADMIN — Medication 20 MG: at 09:52

## 2023-02-08 RX ADMIN — PROPOFOL 150 MG: 10 INJECTION, EMULSION INTRAVENOUS at 09:16

## 2023-02-08 RX ADMIN — DEXAMETHASONE SODIUM PHOSPHATE 4 MG: 4 INJECTION, SOLUTION INTRA-ARTICULAR; INTRALESIONAL; INTRAMUSCULAR; INTRAVENOUS; SOFT TISSUE at 09:20

## 2023-02-08 RX ADMIN — FENTANYL CITRATE 50 MCG: 50 INJECTION, SOLUTION INTRAMUSCULAR; INTRAVENOUS at 09:45

## 2023-02-08 ASSESSMENT — ACTIVITIES OF DAILY LIVING (ADL)
ADLS_ACUITY_SCORE: 18
ADLS_ACUITY_SCORE: 18

## 2023-02-08 NOTE — ANESTHESIA POSTPROCEDURE EVALUATION
Patient: Suzy Rodríguez    Procedure: Procedure(s):  ENDOSCOPIC ULTRASOUND, UPPER GASTROINTESTINAL TRACT (GI), Esophagogastroduodenoscopy with biopsies       Anesthesia Type:  MAC    Note:  Disposition: Outpatient   Postop Pain Control: Uneventful            Sign Out: Well controlled pain   PONV: No   Neuro/Psych: Uneventful            Sign Out: Acceptable/Baseline neuro status   Airway/Respiratory: Uneventful            Sign Out: Acceptable/Baseline resp. status   CV/Hemodynamics: Uneventful            Sign Out: Acceptable CV status; No obvious hypovolemia; No obvious fluid overload   Other NRE: NONE   DID A NON-ROUTINE EVENT OCCUR? No           Last vitals:  Vitals Value Taken Time   /75 02/08/23 1100   Temp 36.7  C (98.1  F) 02/08/23 1025   Pulse 89 02/08/23 1104   Resp 10 02/08/23 1104   SpO2 98 % 02/08/23 1104   Vitals shown include unvalidated device data.    Electronically Signed By: Ashvin Barrett MD  February 8, 2023  11:06 AM

## 2023-02-08 NOTE — OP NOTE
Upper EUS 02/08/2023  8:46 AM [address] [place] [address] [phone]     Endoscopy Department   _______________________________________________________________________________   Patient Name: Suzy Rodríguez       Procedure Date: 2/8/2023 8:46 AM   MRN: [id number]                       Account Number: [id number]   YOB: 1985             Admit Type: Outpatient   Age: 37                               Room:  OR    Gender: Female                        Note Status: Finalized   Attending MD: MIKE PATTEN MD  Total Sedation Time:   _______________________________________________________________________________       Procedure:             Upper EUS   Indications:           Suspected mass in duodenum on CT scan   Providers:             MIKE PATTEN MD, DEBBI BAUTISTA   Patient Profile:       Ms Rodríguez is a 38yo woman with a history of                          adrenocortical carcinoma and an incidental duodenal                          lesion who underwent EUS guided gastrogastrostomy in                          the setting of RYGB and now proceeds to EUS EGD                          following tract maturation.   Referring MD:          SANDRA CRUZ MD   Medicines:             General Anesthesia   Complications:         No immediate complications.   _______________________________________________________________________________   Procedure:             Pre-Anesthesia Assessment:                          - Prior to the procedure, a History and Physical was                          performed, and patient medications and allergies were                          reviewed. The patient is competent. The risks and                          benefits of the procedure and the sedation options and                          risks were discussed with the patient. All questions                          were answered and  informed consent was obtained.                          Patient identification and proposed procedure were                          verified by the nurse in the pre-procedure area.                          Mental Status Examination: alert and oriented. Airway                          Examination: Mallampati Class II (the uvula but not                          tonsillar pillars visualized). Respiratory                          Examination: clear to auscultation. CV Examination:                          normal. ASA Grade Assessment: III - A patient with                          severe systemic disease. After reviewing the risks and                          benefits, the patient was deemed in satisfactory                          condition to undergo the procedure. The anesthesia                          plan was to use general anesthesia. Immediately prior                          to administration of medications, the patient was                          re-assessed for adequacy to receive sedatives. The                          heart rate, respiratory rate, oxygen saturations,                          blood pressure, adequacy of pulmonary ventilation, and                          response to care were monitored throughout the                          procedure. The physical status of the patient was                          re-assessed after the procedure. After obtaining                          informed consent, the endoscope was passed under                          direct vision. Throughout the procedure, the patient's                          blood pressure, pulse, and oxygen saturations were                          monitored continuously. The endoscopes were introduced                          through the mouth, and advanced to the second part of                          duodenum. The Endoscope was introduced through the                          mouth, and advanced to the second part of duodenum.                           The upper EUS was accomplished without difficulty. The                          patient tolerated the procedure well.                                                                                    Findings:        Fluoroscopic, white light and endosonographic findings :        The patient was supine under general anesthesia throughout.  films        of the abdomen revealed the LAMS in its anticipated position. We began        with a gastrosocpe demonstrating an unremarkable esophagus without        inflammation or lesion and the squamocolumnar line was found at the        gastroesophageal junction without significant irregularity. The pouch        was long and the surgical gastrojeunostomy was widely patent without        ulceration. A LAMS was found well seated and fully expanded across the        posterior pouch wall. This was traversed revealing an unremarkable        remnant without lesion or inflammation. A benign appearing sessile polyp        was found just beyod the lip of the pylorus which was suggestive of an        inflammatory polyp. The remainder of the bulb and the sweep were        unremarkable. The proximal Tanika was unremarkable. We then exchanged for        a linear echoendsocope which we navigated across the LAMS. The bulb was        filled with water to accentuate the bulb polyp. This revealed a 9.6mm        wide x 5mm deep lesion that involved the first and second layers of the        mucosa, sparing the third layer and deeper. This lesion was biopsied to        confirm its benign nature. The gallbladder is in situ and without wall        thickening. A single large, mbile hyperechoic object with shadowing was        demonstrated. The common duct was traced from bifurcation to ampulla and        was found to be without wall thickening, dilation (3mm) or internal        solid component. The pancreatic parenchyma was diffusel homogeneous        without lobularity, solid lesion or  liquid lesion. The main duct was        traced from tail to head and was found to be without dilation (up to 2mm        in the head and neck with smooth upstream taper) and normal in course.        There was no evidence of peripancreatic, perigastric, periportal or        celiac region lymphadenopathy. Views of the left lobe and right lobe        were unremarkable. Views of the splenics, superior mesenterics,        gastroduodenal, portal and celiac were tradtional.                                                                                     Impression:            - RYGB with well seated lumen apposing metal stent                          maintaining a mature gastrogastrostomy for duodenal                          access                          - A subcentimeter benign appearing polyp in the bulb                          involing the first and second layers of an otherwise                          unremarkable duodenal wall was demonstrated and                          sampled by surface biopsy; an inflammatory polyp is                          favored                          - No synchronous lesions or lymphadenopathy were found                          - Cholelithiasis without choledocholithiasis or                          cholecystitis                          - Unremarkable pancreas, abdominal vasculature, and                          views of the liver   Recommendation:        - General anesthesia recovery with probable discharge                          home this morning                          - All medications, diet and activity may resume                          without delay                          - Follow up with established physicians as scheduled                          - Dr Gomes to communicate the results of histology                          when available; we suspect this will confirm a benign                          inflammatory polyp and therefore no further management                           of this lesion would be required                          - Repeat upper endoscopy with LAMS removal and pouch                          revision within 8w                          - The findings and recommendations were discussed with                          the patient and their family                                                                                       electronically signed by RIVAS Gomes

## 2023-02-08 NOTE — LETTER
Patient:  Suzy Rodríguez  :   1985  MRN:     [id number]        Ms.Lindsey ISABEL Rodríguez  [address]  [facility] [address]        2023    Dear ,    We are writing to inform you of your test results. In short, great news. As suspected, biopsies of the benign appearing polyp returned without concerning finding. As discussed previously, our plan will involve repeat upper endoscopy for stent removal and pouch revision. We will follow the lesion on interval imaging as already scheduled by Dr Hernadez.    I have included the formal documtentation of the results below. It continues to be a pleasure participating in your care.  Please feel free to contact our clinic with any further questions.      Sincerely,    Khoi Gomes MD PhD FACG GAGE GABRIEL  Associate Professor of Medicine, Surgery and Pediatrics  Interventional and Therapeutic Endoscopy  Chief, Division of Gastroenterology and Hepatology and Nutrition  GI Service     [facility] [address] [address]    New Consultations  [phone]  Procedure Scheduling  [phone]  Clinical Nurse Coordinator [phone]  Clinical Fax   [phone]  Administrative   [phone]  Administrative Fax  [phone]        Resulted Orders   Surgical Pathology Exam   Result Value Ref Range    Case Report       Surgical Pathology Report                         Case: [id number]                                  Authorizing Provider:  Khoi Gomes MD  Collected:           2023 09:49 AM          Ordering Location:      MAIN OR                 Received:            2023 10:12 AM          Pathologist:           Tevin Pascual MD                                                                 Specimen:    Small Intestine, Duodenum, Duodenal polyp biosies--r/o  "adenoma                             Final Diagnosis       A. DUODENAL POLYP, BIOPSY:  -Fragments of gastric type mucosa with features of reactive gastropathy, clinically polypoid  -Negative for dysplasia and malignancy      Clinical Information       Abnormal finding on GI tract imaging       Gross Description       A(1). Small Intestine, Duodenum, Duodenal polyp biosies--r/o adenoma:  The specimen is received in formalin with proper patient identification, labeled \"duodenal polyp biopsies\".  The specimen consists of 2 irregular tan pieces of soft tissue measuring 0.2-0.3 cm in greatest dimension which are entirely submitted in cassette A1.      Microscopic Description       Microscopic examination was performed.        Performing Labs       The technical component of this testing was completed at [facility]      Case Images             "

## 2023-02-08 NOTE — ANESTHESIA CARE TRANSFER NOTE
Patient: Suzy Rodríguez    Procedure: Procedure(s):  ENDOSCOPIC ULTRASOUND, UPPER GASTROINTESTINAL TRACT (GI), Esophagogastroduodenoscopy with biopsies       Diagnosis: Abnormal finding on GI tract imaging [R93.3]  Diagnosis Additional Information: No value filed.    Anesthesia Type:   MAC     Note:    Oropharynx: oropharynx clear of all foreign objects and spontaneously breathing  Level of Consciousness: awake  Oxygen Supplementation: nasal cannula  Level of Supplemental Oxygen (L/min / FiO2): 3  Independent Airway: airway patency satisfactory and stable  Dentition: dentition unchanged  Vital Signs Stable: post-procedure vital signs reviewed and stable  Report to RN Given: handoff report given  Patient transferred to: PACU    Handoff Report: Identifed the Patient, Identified the Reponsible Provider, Reviewed the pertinent medical history, Discussed the surgical course, Reviewed Intra-OP anesthesia mangement and issues during anesthesia, Set expectations for post-procedure period and Allowed opportunity for questions and acknowledgement of understanding      Vitals:  Vitals Value Taken Time   /77 02/08/23 1026   Temp     Pulse 75 02/08/23 1028   Resp 9 02/08/23 1028   SpO2 100 % 02/08/23 1028   Vitals shown include unvalidated device data.    Electronically Signed By: MYRON Parmar CRNA  February 8, 2023  10:29 AM

## 2023-02-08 NOTE — ANESTHESIA PROCEDURE NOTES
Airway       Patient location during procedure: OR       Procedure Start/Stop Times: 2/8/2023 9:18 AM  Staff -        CRNA: Wilda Marie APRN CRNA       Performed By: CRNA  Consent for Airway        Urgency: elective  Indications and Patient Condition       Indications for airway management: yumiko-procedural       Induction type:intravenous       Mask difficulty assessment: 1 - vent by mask    Final Airway Details       Final airway type: endotracheal airway       Successful airway: ETT - single and Oral  Endotracheal Airway Details        ETT size (mm): 7.0       Cuffed: yes       Successful intubation technique: direct laryngoscopy       DL Blade Type: MAC 3       Grade View of Cords: 1       Adjucts: stylet       Position: Right       Measured from: lips       Secured at (cm): 22    Post intubation assessment        Placement verified by: capnometry, equal breath sounds and chest rise        Number of attempts at approach: 1       Secured with: pink tape       Ease of procedure: easy       Dentition: Unchanged and Intact       Dental guard used and removed.    Medication(s) Administered   Medication Administration Time: 2/8/2023 9:18 AM

## 2023-02-08 NOTE — DISCHARGE INSTRUCTIONS
[facility]  Same-Day Surgery   Adult Discharge Orders & Instructions     For 24 hours after surgery    Get plenty of rest.  A responsible adult must stay with you for at least 24 hours after you leave the hospital.   Do not drive or use heavy equipment.  If you have weakness or tingling, don't drive or use heavy equipment until this feeling goes away.  Do not drink alcohol.  Avoid strenuous or risky activities.  Ask for help when climbing stairs.   You may feel lightheaded.  IF so, sit for a few minutes before standing.  Have someone help you get up.   If you have nausea (feel sick to your stomach): Drink only clear liquids such as apple juice, ginger ale, broth or 7-Up.  Rest may also help.  Be sure to drink enough fluids.  Move to a regular diet as you feel able.  You may have a slight fever. Call the doctor if your fever is over 100 F (37.7 C) (taken under the tongue) or lasts longer than 24 hours.  You may have a dry mouth, a sore throat, muscle aches or trouble sleeping.  These should go away after 24 hours.  Do not make important or legal decisions.   Call your doctor for any of the followin.  Signs of infection (fever, growing tenderness at the surgery site, a large amount of drainage or bleeding, severe pain, foul-smelling drainage, redness, swelling).    2. It has been over 8 to 10 hours since surgery and you are still not able to urinate (pass water).    3.  Headache for over 24 hours.    4.  Numbness, tingling or weakness the day after surgery (if you had spinal anesthesia).  To contact a doctor, call Dr. Kruse, at [phone], during clinic hours or     '   [phone] and ask for the resident on call for General Surgery (answered 24 hours a day)  '   Emergency Department:    [facility]: [phone]       (TTY for hearing impaired: [phone])    [facility]: [phone]       (TTY for hearing impaired: [phone])

## 2023-02-08 NOTE — OR NURSING
Patient was prepped in pre-op unit according to facility policy and MD orders. Patient's questions were answered and pt was able to verbalize an understanding of the information provided. Patient denies any outstanding questions and or concerns at this time.   Nursing will continue to support as indicated. Patient was released to OR staff by writer.

## 2023-02-08 NOTE — ANESTHESIA PREPROCEDURE EVALUATION
"Anesthesia Pre-Procedure Evaluation    Patient: Suzy Rodríguez   MRN: [id number] : 1985        Procedure : Procedure(s):  ENDOSCOPIC ULTRASOUND, ESOPHAGOSCOPY / UPPER GASTROINTESTINAL TRACT (GI)          Past Medical History:   Diagnosis Date     Adrenal cortex cancer, right (H) 2018    Resected 18, Dr. Mansfield.     Adrenal mass (H)      Anemia     thought due to heavy menses.     Calculus of ureter 2018     Carcinoma, adrenal cortical (H) 2018     Chocolate cyst of ovary      Clotting disorder (H)      Depression      GERD (gastroesophageal reflux disease)      History of miscarriage      Hypertension due to endocrine disorder 2018     Kidney stones     passed on their own     Malignant neoplasm of cortex of right adrenal gland (H) 2018    EOTD; 18.  Check in May. SCP started.  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     SHREYA on CPAP 2015    Stopped using CPAP the  after weight loss as even at lower pressure/adjustment couldn't tolerate the cpap. Resting well, cautioned to watch for any signs of fatigue and consider \"titration study\" in the future to see if cpap is still required at her new weight.     Pneumonia     hx of recurrent pneumonia issues/respiratory infections.     Polycystic ovary syndrome     able to get pregnant, not on meds.     Pulmonary embolism (H)  or so    briefly on wafarin.     Sleep apnea     cpap     Vitamin D deficiency       Past Surgical History:   Procedure Laterality Date     " ADRENALECTOMY Right 2018    Procedure: ADRENALECTOMY;  Right Adrenalectomy,  Embolize Liver , Anesthesia Block;  Surgeon: Warren Mansfield MD;  Location: [facility] OR     ADRENALECTOMY       ADRENALECTOMY Right       SECTION      twice      SECTION      2      SECTION      x2     EMBOLECTOMY ABDOMEN N/A 2018    Procedure: EMBOLECTOMY ABDOMEN;;  Surgeon: Ector Mcintyre MD;  Location: [facility] OR     ENDOSCOPIC ULTRASOUND UPPER GASTROINTESTINAL TRACT (GI) N/A 2023    Procedure: ENDOSCOPIC ULTRASOUND, ESOPHAGOSCOPY / UPPER GASTROINTESTINAL TRACT (GI), with cystgastrostomy stent placement and dilation;  Surgeon: Khoi Gomes MD;  Location: [facility] OR     ESOPHAGOSCOPY, GASTROSCOPY, DUODENOSCOPY (EGD), COMBINED N/A 2022    Procedure: ESOPHAGOGASTRODUODENOSCOPY,  NEEDLE  WITH ENDOSCOPIC ULTRASOUND GUIDANCE;  Surgeon: Khoi Gomes MD;  Location: [facility] OR     EXTRACORPOREAL SHOCK WAVE LITHOTRIPSY (ESWL)       GASTRIC BYPASS       AL CYSTO/URETERO W/LITHOTRIPSY &INDWELL STENT INSRT Left 2018    Procedure: CYSTOSCOPY, LEFT URETEROSCOPY LASER LITHOTRIPSY STENT INSERTION;  Surgeon: Skyler Delvalle MD;  Location: [facility] OR;  Service: Urology     AL LAP GASTRIC BYPASS/DERICK-EN-Y N/A 2016    Procedure: GASTRIC BYPASS LAPAROSCOPIC DERICK-EN-Y;  Surgeon: Randy Sutherland MD;  Location: [facility] OR;  Service: General     TUBAL LIGATION       WISDOM TOOTH EXTRACTION Bilateral       Allergies   Allergen Reactions     Bee Venom Swelling     Ibuprofen Hives and Swelling      Social History     Tobacco Use     Smoking status: Former     Smokeless tobacco: Never     Tobacco comments:     vapes marijuana   Substance Use Topics     Alcohol use: Yes     Comment: occ.      Wt Readings from Last 1 Encounters:   23 69 kg (152 lb 1.9 oz)        Anesthesia Evaluation            ROS/MED HX  ENT/Pulmonary:     (+) sleep apnea,     Neurologic:  - neg  neurologic ROS     Cardiovascular:     (+) hypertension-----Previous cardiac testing   Echo: Date: Results:    Stress Test: Date: Results:    ECG Reviewed: Date: 08/21 Results:  NSR  Cath: Date: Results:      METS/Exercise Tolerance:     Hematologic:  - neg hematologic  ROS     Musculoskeletal:  - neg musculoskeletal ROS     GI/Hepatic:     (+) GERD,     Renal/Genitourinary:     (+) Nephrolithiasis ,     Endo: Comment: Cushing's s/p adrenalectomy    (+) thyroid problem, Obesity,  Chronic steroid usage: 08/21.   Psychiatric/Substance Use:  - neg psychiatric ROS   (+) psychiatric history anxiety, depression and other (comment) (ADHD)     Infectious Disease:       Malignancy:   (+) Malignancy, History of Other.Other CA status post Surgery.    Other:            Physical Exam    Airway        Mallampati: I   TM distance: > 3 FB   Neck ROM: full   Mouth opening: > 3 cm    Respiratory Devices and Support         Dental       (+) Completely normal teeth      Cardiovascular   cardiovascular exam normal          Pulmonary   pulmonary exam normal                OUTSIDE LABS:  CBC:   Lab Results   Component Value Date    WBC 5.3 11/21/2022    WBC 5.0 11/07/2022    HGB 12.1 11/21/2022    HGB 12.4 11/07/2022    HCT 39.0 11/21/2022    HCT 39.4 11/07/2022     11/21/2022     11/07/2022     BMP:   Lab Results   Component Value Date     10/25/2022     09/29/2022     09/29/2022    POTASSIUM 4.2 10/25/2022    POTASSIUM 4.3 09/29/2022    POTASSIUM 4.2 09/29/2022    CHLORIDE 104 10/25/2022    CHLORIDE 104 09/29/2022    CHLORIDE 104 09/29/2022    CO2 27 10/25/2022    CO2 27 09/29/2022    CO2 27 09/29/2022    BUN 9.1 10/25/2022    BUN 9.6 09/29/2022    BUN 9.2 09/29/2022    CR 0.75 10/25/2022    CR 0.80 09/29/2022    CR 0.82 09/29/2022    GLC 91 10/25/2022    GLC 92 09/29/2022    GLC 91 09/29/2022     COAGS:   Lab Results   Component Value Date    INR 0.89 06/29/2018     POC:   Lab Results   Component Value  Date     (H) 06/25/2018    HCG Negative 11/21/2022    HCGS Negative 06/25/2018     HEPATIC:   Lab Results   Component Value Date    ALBUMIN 4.3 10/25/2022    PROTTOTAL 6.5 10/25/2022    ALT 13 10/25/2022    AST 20 10/25/2022    ALKPHOS 139 (H) 10/25/2022    BILITOTAL 0.3 10/25/2022     OTHER:   Lab Results   Component Value Date    PH 7.34 (L) 06/25/2018    LACT 2.2 (H) 06/29/2018    A1C 5.3 10/29/2015    SHASHA 9.1 10/25/2022    LIPASE 144 06/29/2018    TSH 1.98 01/09/2023    T4 1.23 01/09/2023    T3 100 05/12/2022       Anesthesia Plan    ASA Status:  2      Anesthesia Type: General.     - Airway: ETT      Maintenance: Balanced.        Consents    Anesthesia Plan(s) and associated risks, benefits, and realistic alternatives discussed. Questions answered and patient/representative(s) expressed understanding.     - Discussed: Risks, Benefits and Alternatives for BOTH SEDATION and the PROCEDURE were discussed     - Discussed with:  Patient         Postoperative Care            Comments:                Asa De La Rosa MD

## 2023-02-08 NOTE — ANESTHESIA POSTPROCEDURE EVALUATION
Patient: Suzy Rodríguez    Procedure: Procedure(s):  ENDOSCOPIC ULTRASOUND, UPPER GASTROINTESTINAL TRACT (GI), Esophagogastroduodenoscopy with biopsies       Anesthesia Type:  MAC    Note:  Disposition: Outpatient   Postop Pain Control: Uneventful            Sign Out: Well controlled pain   PONV: No   Neuro/Psych: Uneventful            Sign Out: Acceptable/Baseline neuro status   Airway/Respiratory: Uneventful            Sign Out: Acceptable/Baseline resp. status   CV/Hemodynamics: Uneventful            Sign Out: Acceptable CV status; No obvious hypovolemia; No obvious fluid overload   Other NRE: NONE   DID A NON-ROUTINE EVENT OCCUR? No           Last vitals:  Vitals Value Taken Time   /83 02/08/23 1045   Temp 36.7  C (98.1  F) 02/08/23 1025   Pulse 74 02/08/23 1053   Resp 13 02/08/23 1053   SpO2 99 % 02/08/23 1053   Vitals shown include unvalidated device data.    Electronically Signed By: Ivette Wells MD  February 8, 2023  10:55 AM

## 2023-02-09 LAB
PATH REPORT.COMMENTS IMP SPEC: NORMAL
PATH REPORT.COMMENTS IMP SPEC: NORMAL
PATH REPORT.FINAL DX SPEC: NORMAL
PATH REPORT.GROSS SPEC: NORMAL
PATH REPORT.MICROSCOPIC SPEC OTHER STN: NORMAL
PATH REPORT.RELEVANT HX SPEC: NORMAL
PHOTO IMAGE: NORMAL

## 2023-02-13 ENCOUNTER — PATIENT OUTREACH (OUTPATIENT)
Dept: GASTROENTEROLOGY | Facility: CLINIC | Age: 38
End: 2023-02-13

## 2023-02-13 ENCOUNTER — PREP FOR PROCEDURE (OUTPATIENT)
Dept: GASTROENTEROLOGY | Facility: CLINIC | Age: 38
End: 2023-02-13

## 2023-02-13 DIAGNOSIS — K90.9 INTESTINAL MALABSORPTION, UNSPECIFIED TYPE: ICD-10-CM

## 2023-02-13 DIAGNOSIS — K91.2 POSTOPERATIVE MALABSORPTION: Primary | ICD-10-CM

## 2023-02-13 DIAGNOSIS — Z98.84 HISTORY OF GASTRIC BYPASS: Primary | ICD-10-CM

## 2023-02-13 NOTE — TELEPHONE ENCOUNTER
Post procedure with Dr. Gomes 23  Repeat upper endoscopy with LAMS removal and pouch  revision within 8w     Please assist in scheduling:     Procedure/Imaging/Clinic: EGD  Physician: Dr. Gomes  Timin weeks  Procedure length:provider average  Anesthesia:gen  Dx: hx gastric bypass  Tier:2  Location: [facility]     Left message, orders placed    ML

## 2023-02-16 DIAGNOSIS — C74.01 MALIGNANT NEOPLASM OF CORTEX OF RIGHT ADRENAL GLAND (H): ICD-10-CM

## 2023-02-16 DIAGNOSIS — F51.01 PRIMARY INSOMNIA: ICD-10-CM

## 2023-02-16 RX ORDER — TEMAZEPAM 15 MG/1
15-30 CAPSULE ORAL
Qty: 60 CAPSULE | Refills: 5 | Status: ON HOLD | OUTPATIENT
Start: 2023-02-16 | End: 2024-03-11

## 2023-02-20 ENCOUNTER — PATIENT OUTREACH (OUTPATIENT)
Dept: GASTROENTEROLOGY | Facility: CLINIC | Age: 38
End: 2023-02-20
Payer: COMMERCIAL

## 2023-02-20 NOTE — TELEPHONE ENCOUNTER
Talked to patient, no concerns after last procedure, agreed next procedure to occur on 3/29, message sent to scheduling    ML

## 2023-02-28 ENCOUNTER — ALLIED HEALTH/NURSE VISIT (OUTPATIENT)
Dept: FAMILY MEDICINE | Facility: CLINIC | Age: 38
End: 2023-02-28
Payer: COMMERCIAL

## 2023-02-28 DIAGNOSIS — Z23 NEED FOR VACCINATION: Primary | ICD-10-CM

## 2023-02-28 PROCEDURE — 99207 PR NO CHARGE NURSE ONLY: CPT

## 2023-02-28 PROCEDURE — 90707 MMR VACCINE SC: CPT

## 2023-02-28 PROCEDURE — 90716 VAR VACCINE LIVE SUBQ: CPT

## 2023-02-28 PROCEDURE — 90472 IMMUNIZATION ADMIN EACH ADD: CPT

## 2023-02-28 PROCEDURE — 90471 IMMUNIZATION ADMIN: CPT

## 2023-02-28 NOTE — PROGRESS NOTES
Patient is here for MMR and varicella vaccines as required by her school. Verified with Dr. Leal this is ok.  Faby Guido, CMA

## 2023-03-01 ENCOUNTER — VIRTUAL VISIT (OUTPATIENT)
Dept: ONCOLOGY | Facility: CLINIC | Age: 38
End: 2023-03-01
Attending: INTERNAL MEDICINE
Payer: COMMERCIAL

## 2023-03-01 DIAGNOSIS — C74.01 MALIGNANT NEOPLASM OF CORTEX OF RIGHT ADRENAL GLAND (H): Primary | ICD-10-CM

## 2023-03-01 PROCEDURE — 99214 OFFICE O/P EST MOD 30 MIN: CPT | Mod: VID | Performed by: INTERNAL MEDICINE

## 2023-03-01 NOTE — PROGRESS NOTES
"  [facility] Oncology Followup  Mar 1, 2023     REFERRING PROVIDER: Warren Mansfield MD from [facility] Urologic Oncology clinic.      REASON FOR CURRENT VISIT: Follow-up for adrenocortical carcinoma,    ONCOLOGIC HISTORY:  1. 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   - CT abdomen and pelvis stone protocol without contrast 5 [id number] showed \"9.2 x 8 cm heterogeneous right upper quadrant mass with small foci of calcification within it which is likely arising from the adrenal gland though inseparable from liver and upper pole of right kidney. It is incompletely evaluated on this noncontrast study. 3 x 2.6 cm mass right lobe of liver on image #85 also incompletely evaluated. 6 x 4 x 4 mm upper third left ureteral obstructing stone with mild to moderate secondary hydronephrosis. Collection of stones at lower pole left kidney extending over an area of approximately 6 x 7 x 4 mm. Additional nonobstructing 1 mm stone upper pole left kidney. 2 mm nonobstructing stone noted upper pole right kidney with no hydronephrosis on the right. Postop change consistent with gastric bypass. Noncontrast images of spleen, left adrenal gland, gallbladder, and pancreas unremarkable. No aneurysm. No adenopathy.\"  - Seen by Dr. Delvalle at [facility] Urology clinic. Referred to Dr. Alvino Patel and then Dr. Warren Mansfield.  - Endocrinology team directed workup showed high DHEAS level of 740 pre-surgery.   - Right open adrenalectomy and hepatic mobilization performed by Dr. Warren Mansfield on 6/25/2018. Pathology showed adrenocortical carcinoma measuring 11.3 cm, weighing 413 g with extension into or through the adrenal capsule negative lymphovascular invasion, tumor necrosis present, margins involved by tumor, no regional lymph nodes identified, pathologic " stage T2 NX.  pathology review reveals low grade ACC.  - DHEAS normalized postsurgery.   - Adjuvant Mitotane started on 7/24/18. Dose increased to 2000mg TID around 10/23/18 due to persistently low troughs. Held 1/16/19 for two weeks and resumed 1/30 at 1000 mg po BID due to very poor tolerance of higher doses. Highest mitotane level was 10.2 on 2/11/19. Mitotane troughs did not rise above 10 despite increase in dose to 1500 BID and then 1500+2000. Started 2000mg BID on 10/28/20. Increased to [id number] since around Christmas 2020.   - Saw radiation oncology at [facility], radiation oncology at [facility], as well as endocrine oncology (Dr. Huertas) at [facility].   - S/p adjuvant RT by Dr. Monsivais - 5040 cGy over 30 fr (8/24/18-10/6/18).  - 2/11/19: OSH CT C/A/P and MRI brain with contrast - no evidence of disease recurrence.   - 06/08/20, 09/14/20, 12/7/20, 3/16/2021: CT C/A/P with contrast - AFUA.    INTERVAL HISTORY:  Ms. Rodríguez is a 37 year old woman with history notable for right-sided functioning adrenocortical carcinoma (diagnosed in May 2018), who was  on adjuvant mitotane until May 2021.     She is being followed over a video visit.    She has been off mitotane now for over a year and half.  She is doing well overall.      She had a visit with Dr. Jaquez and she held her cortisone and levothyroxine after consultation in endocrinology. At her recent follow up in her PCP clinic she was noted to have some tremors. Personally she has not noticed it and is not bothersome. She believes she had it when she was on mitotane and hydrocortisone therapy.     She does not have any other new complaints at this visit.       She is being seen for a scheduled follow-up visit.    REVIEW OF SYSTEMS: 14 point ROS negative other than the symptoms noted above in the HPI.    PAST MEDICAL HISTORY:  Past Medical History:   Diagnosis Date     Adrenal cortex cancer, right (H) 6/30/2018     "Resected 18, Dr. Mansfield.     Adrenal mass (H)      Anemia     thought due to heavy menses.     Calculus of ureter 2018     Carcinoma, adrenal cortical (H) 2018     Chocolate cyst of ovary      Clotting disorder (H)      Depression      GERD (gastroesophageal reflux disease)      History of miscarriage      Hypertension due to endocrine disorder 2018     Kidney stones     passed on their own     Malignant neoplasm of cortex of right adrenal gland (H) 2018    EOTD; 19.  Check in May. SCP started.  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     SHREYA on CPAP 2015    Stopped using CPAP the  of  after weight loss as even at lower pressure/adjustment couldn't tolerate the cpap. Resting well, cautioned to watch for any signs of fatigue and consider \"titration study\" in the future to see if cpap is still required at her new weight.     Pneumonia     hx of recurrent pneumonia issues/respiratory infections.     Polycystic ovary syndrome     able to get pregnant, not on meds.     Pulmonary embolism (H)  or so    briefly on wafarin.     Sleep apnea     cpap     Vitamin D deficiency    MHTFR mutation with h/o mutiple miscarriages.    PAST SURGICAL HISTORY:   Past Surgical History:   Procedure Laterality Date     ADRENALECTOMY Right 2018    Procedure: ADRENALECTOMY;  Right Adrenalectomy,  Embolize Liver , Anesthesia Block;  Surgeon: Warren Mansfield MD;  Location: [facility] OR     ADRENALECTOMY       ADRENALECTOMY Right       SECTION   "    twice      SECTION      2      SECTION      x2     EMBOLECTOMY ABDOMEN N/A 2018    Procedure: EMBOLECTOMY ABDOMEN;;  Surgeon: Ector Mcintyre MD;  Location: [facility] OR     ENDOSCOPIC ULTRASOUND UPPER GASTROINTESTINAL TRACT (GI) N/A 2023    Procedure: ENDOSCOPIC ULTRASOUND, ESOPHAGOSCOPY / UPPER GASTROINTESTINAL TRACT (GI), with cystgastrostomy stent placement and dilation;  Surgeon: Khoi Gomes MD;  Location: [facility] OR     ENDOSCOPIC ULTRASOUND UPPER GASTROINTESTINAL TRACT (GI) N/A 2023    Procedure: ENDOSCOPIC ULTRASOUND, UPPER GASTROINTESTINAL TRACT (GI), Esophagogastroduodenoscopy with biopsies;  Surgeon: Khoi Gomes MD;  Location: [facility] OR     ESOPHAGOSCOPY, GASTROSCOPY, DUODENOSCOPY (EGD), COMBINED N/A 2022    Procedure: ESOPHAGOGASTRODUODENOSCOPY,  NEEDLE  WITH ENDOSCOPIC ULTRASOUND GUIDANCE;  Surgeon: Khoi Gomes MD;  Location: [facility] OR     EXTRACORPOREAL SHOCK WAVE LITHOTRIPSY (ESWL)       GASTRIC BYPASS       ID CYSTO/URETERO W/LITHOTRIPSY &INDWELL STENT INSRT Left 2018    Procedure: CYSTOSCOPY, LEFT URETEROSCOPY LASER LITHOTRIPSY STENT INSERTION;  Surgeon: Skyler Delvalle MD;  Location: [facility];  Service: Urology     ID LAP GASTRIC BYPASS/DERICK-EN-Y N/A 2016    Procedure: GASTRIC BYPASS LAPAROSCOPIC DERICK-EN-Y;  Surgeon: Randy Sutherland MD;  Location: [facility] OR;  Service: General     TUBAL LIGATION       WISDOM TOOTH EXTRACTION Bilateral       SOCIAL HISTORY:   Social History     Tobacco Use     Smoking status: Former     Smokeless tobacco: Never     Tobacco comments:     vapes marijuana   Vaping Use     Vaping Use: Never used   Substance Use Topics     Alcohol use: Yes     Comment: occ.     Drug use: Yes     Types: Marijuana     FAMILY HISTORY:   Family History   Problem Relation Age of Onset     Depression Paternal Grandmother      Diabetes Mother      Diabetes Father      Hypertension Father      Chronic  Obstructive Pulmonary Disease Father      Cancer Maternal Grandmother         gastric     Urolithiasis Maternal Grandmother      Heart Disease Maternal Grandfather      Cancer Maternal Grandfather         lung     Heart Disease Paternal Grandfather      Breast Cancer No family hx of      Colon Cancer No family hx of      Prostate Cancer No family hx of      Cerebrovascular Disease No family hx of      Clotting Disorder No family hx of      Gout No family hx of      ALLERGIES:   Allergies   Allergen Reactions     Bee Venom Swelling     Ibuprofen Hives and Swelling     CURRENT MEDICATIONS:   Current Outpatient Medications   Medication Instructions     acetaminophen (TYLENOL) 650 mg, Oral, EVERY 4 HOURS PRN     buPROPion (WELLBUTRIN SR) 150 mg, Oral     calcitRIOL (ROCALTROL) 0.5 MCG capsule No dose, route, or frequency recorded.     calcium carbonate 500 mg (elemental) (OSCAL) 500 mg, Oral, 2 TIMES DAILY     clonazePAM (KLONOPIN) 0.5 mg, Oral, 2 TIMES DAILY PRN     diazepam (VALIUM) 5 mg, Oral, ONCE PRN     fludrocortisone (FLORINEF) 0.2 mg, Oral, DAILY     FLUoxetine (PROZAC) 60 mg, Oral, DAILY     gabapentin (NEURONTIN) 900 mg, Oral, 3 TIMES DAILY     hydrocortisone (CORTEF) 10 MG tablet Take 3 tablets (30 MG) every morning and take 2 tablets (20 MG) by mouth every day at 2 PM. Additional as directed.     levothyroxine (SYNTHROID/LEVOTHROID) 125 mcg, Oral, DAILY     ondansetron (ZOFRAN) 8 mg, Oral, EVERY 8 HOURS PRN     prochlorperazine (COMPAZINE) 5 mg, Oral, EVERY 8 HOURS PRN     temazepam (RESTORIL) 15 mg, Oral, AT BEDTIME PRN     UNABLE TO FIND Other, medical cannabis (Patient's own supply. Not a prescription)       PHYSICAL EXAMINATION:  Vital signs: There were no vitals taken for this visit.   Gen: NAD, on video no distress  No other exam    Recent Labs   Lab Test 10/25/22  1003 09/29/22  0758 05/12/22  1017 03/24/22  1102 03/24/22  0728    141  141 139 138 140   POTASSIUM 4.2 4.2  4.3 4.4 3.6 3.9    CHLORIDE 104 104  104 107 102 107   CO2 27 27  27 27 29 30   ANIONGAP 8 10  10 5 7 3   BUN 9.1 9.2  9.6 6* 12 12   CR 0.75 0.82  0.80 0.72 0.80 0.78   GLC 91 91  92 86 70 88   SHASHA 9.1 8.4*  8.4* 9.1 8.8 9.1     No results for input(s): MAG, PHOS in the last 45582 hours.  Recent Labs   Lab Test 11/21/22  1527 11/07/22  1326 10/25/22  1003 09/29/22  0758 05/12/22  1017 03/24/22  0822   WBC 5.3 5.0 3.5* 3.7* 4.0 4.3   HGB 12.1 12.4 12.5 11.7 12.7 13.8    257 253 255 226 215   MCV 85 85 85 86 89 92   NEUTROPHIL  --  60 54 56 65 54     Recent Labs   Lab Test 10/25/22  1003 09/29/22  0758 05/12/22  1017   BILITOTAL 0.3 0.2 0.2   ALKPHOS 139* 145* 138   ALT 13 20 29   AST 20 20 21   ALBUMIN 4.3 3.9 3.5     TSH   Date Value Ref Range Status   01/09/2023 1.98 0.30 - 4.20 uIU/mL Final   10/25/2022 3.14 0.30 - 4.20 uIU/mL Final   09/29/2022 1.82 0.30 - 4.20 uIU/mL Final   05/12/2022 0.16 (L) 0.40 - 4.00 mU/L Final   03/24/2022 0.08 (L) 0.40 - 4.00 mU/L Final   02/08/2022 0.29 (L) 0.40 - 4.00 mU/L Final   07/05/2021 0.22 (L) 0.40 - 4.00 mU/L Final   06/04/2021 0.44 0.40 - 4.00 mU/L Final   05/10/2021 0.18 (L) 0.40 - 4.00 mU/L Final        ASSESSMENT AND PLAN: A 37 year old  female with right-sided functioning high-risk adrenocortical carcinoma.     Adrenocortical carcinoma:  - Started on adjuvant mitotane in July 2018 based on her presentation and tumor characteristics including invasion of the adrenal capsule, high mitotic rate, possibly positive margins, and high Ki67, which could result in a rate of recurrence as high as 40%.    The mitotane was stopped by Dr. Liang and endocrinologic oncologist from [facility] in May 2021.       She is being followed by Dr. Jaquez in endocrinology. She held her cortisone and levothyroxine after consultation in endocrinology. She noticed sore throat and headaches for 2 weeks. She initial felt this was a viral illness but then it persisted. She restarted her  cortisone and it helped.    She did not have labs or scans prior to this visit. She explained that she would have scans in June/July at the earliest.     She was noted to have some tremors. She could not explain if these were at rest or intention tremors. These do not bother her at all and were noticed by her PCP during the visit. She gets tremors after taking coffee. I will refer her to neurology if she has worsening tremors. She is on Ritalin and fluoxetine which together increase risk of tremors.     She does not have questions or concerns at this visit. I will see her in 4 months with labs and restaging scans.         Video-Visit Details    Type of service:  Video Visit  Originating Location (pt. Location): Home  Distant Location (provider location):  [facility]   Platform used for Video Visit: [employer] Workflow Management    25 minutes spent on the date of the encounter doing chart review, history and exam, documentation and further activities as noted above      Tru Hernadez    Hematologist and Medical Oncologist  [facility]

## 2023-03-01 NOTE — NURSING NOTE
Is the patient currently in the state of [place]? YES    Visit mode:VIDEO    If the visit is dropped, the patient can be reconnected by: VIDEO VISIT: Text to cell phone: [phone]    Will anyone else be joining the visit? NO         How would you like to obtain your AVS? MyChart    Are changes needed to the allergy or medication list? NO  Moni Pratt    Reason for visit: Follow up

## 2023-03-01 NOTE — LETTER
"    3/1/2023         RE: Suzy Rodríguez  [address]  [facility] [address]        Dear Colleague,    Thank you for referring your patient, Suzy Rodríguez, to the [facility]. Please see a copy of my visit note below.    Video-Visit Details    Type of service:  Video Visit  Originating Location (pt. Location): Home    Distant Location (provider location):  Off-site    Mode of Communication:  Video Conference via [facility] Followup  Mar 1, 2023     REFERRING PROVIDER: Warren Mansfield MD from [facility].      REASON FOR CURRENT VISIT: Follow-up for adrenocortical carcinoma,    ONCOLOGIC HISTORY:  1. 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   - CT abdomen and pelvis stone protocol without contrast 5 8115 showed \"9.2 x 8 cm heterogeneous right upper quadrant mass with small foci of calcification within it which is likely arising from the adrenal gland though inseparable from liver and upper pole of right kidney. It is incompletely evaluated on this noncontrast study. 3 x 2.6 cm mass right lobe of liver on image #85 also incompletely evaluated. 6 x 4 x 4 mm upper third left ureteral obstructing stone with mild to moderate secondary hydronephrosis. Collection of stones at lower pole left kidney extending over an area of approximately 6 x 7 x 4 mm. Additional nonobstructing 1 mm stone upper pole left kidney. 2 mm nonobstructing stone noted upper pole right kidney with no hydronephrosis on the right. Postop change consistent with gastric bypass. Noncontrast images of spleen, left adrenal gland, gallbladder, and pancreas unremarkable. No aneurysm. No adenopathy.\"  - Seen by Dr. Delvalle at [facility] clinic. Referred to Dr. Alvino Patel and " then Dr. Warren Mansfield.  - Endocrinology team directed workup showed high DHEAS level of 740 pre-surgery.   - Right open adrenalectomy and hepatic mobilization performed by Dr. Warren Mansfield on 6/25/2018. Pathology showed adrenocortical carcinoma measuring 11.3 cm, weighing 413 g with extension into or through the adrenal capsule negative lymphovascular invasion, tumor necrosis present, margins involved by tumor, no regional lymph nodes identified, pathologic stage T2 NX.  pathology review reveals low grade ACC.  - DHEAS normalized postsurgery.   - Adjuvant Mitotane started on 7/24/18. Dose increased to 2000mg TID around 10/23/18 due to persistently low troughs. Held 1/16/19 for two weeks and resumed 1/30 at 1000 mg po BID due to very poor tolerance of higher doses. Highest mitotane level was 10.2 on 2/11/19. Mitotane troughs did not rise above 10 despite increase in dose to 1500 BID and then 1500+2000. Started 2000mg BID on 10/28/20. Increased to [id number] since around Christmas 2020.   - Saw radiation oncology at [facility], radiation oncology at [facility], as well as endocrine oncology (Dr. Huertas) at [facility].   - S/p adjuvant RT by Dr. Monsivais - 5040 cGy over 30 fr (8/24/18-10/6/18).  - 2/11/19: OSH CT C/A/P and MRI brain with contrast - no evidence of disease recurrence.   - 06/08/20, 09/14/20, 12/7/20, 3/16/2021: CT C/A/P with contrast - AFUA.    INTERVAL HISTORY:  Ms. Rodríguez is a 37 year old woman with history notable for right-sided functioning adrenocortical carcinoma (diagnosed in May 2018), who was  on adjuvant mitotane until May 2021.     She is being followed over a video visit.    She has been off mitotane now for over a year and half.  She is doing well overall.      She had a visit with Dr. Jaquez and she held her cortisone and levothyroxine after consultation in endocrinology. At her recent follow up in her PCP clinic she was noted to have  "some tremors. Personally she has not noticed it and is not bothersome. She believes she had it when she was on mitotane and hydrocortisone therapy.     She does not have any other new complaints at this visit.       She is being seen for a scheduled follow-up visit.    REVIEW OF SYSTEMS: 14 point ROS negative other than the symptoms noted above in the HPI.    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 Mansfield.     Adrenal mass (H)      Anemia     thought due to heavy menses.     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  Check in May. SCP started.  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     SHREYA on CPAP 12/17/2015    Stopped using CPAP the Fall of 2016 after weight loss as even at lower pressure/adjustment couldn't tolerate the cpap. Resting well, cautioned to watch for any signs of fatigue and consider \"titration study\" in the future to see if cpap is still required at her new weight.     Pneumonia     hx of recurrent pneumonia issues/respiratory infections.     Polycystic ovary syndrome     able to get pregnant, not on meds.     " Pulmonary embolism (H)  or so    briefly on wafarin.     Sleep apnea     cpap     Vitamin D deficiency    MHTFR mutation with h/o mutiple miscarriages.    PAST SURGICAL HISTORY:   Past Surgical History:   Procedure Laterality Date     ADRENALECTOMY Right 2018    Procedure: ADRENALECTOMY;  Right Adrenalectomy,  Embolize Liver , Anesthesia Block;  Surgeon: Warren Mansfield MD;  Location: [facility] OR     ADRENALECTOMY       ADRENALECTOMY Right       SECTION      twice      SECTION      2      SECTION      x2     EMBOLECTOMY ABDOMEN N/A 2018    Procedure: EMBOLECTOMY ABDOMEN;;  Surgeon: Ector Mcintyre MD;  Location: [facility] OR     ENDOSCOPIC ULTRASOUND UPPER GASTROINTESTINAL TRACT (GI) N/A 2023    Procedure: ENDOSCOPIC ULTRASOUND, ESOPHAGOSCOPY / UPPER GASTROINTESTINAL TRACT (GI), with cystgastrostomy stent placement and dilation;  Surgeon: Khoi Gomes MD;  Location: [facility] OR     ENDOSCOPIC ULTRASOUND UPPER GASTROINTESTINAL TRACT (GI) N/A 2023    Procedure: ENDOSCOPIC ULTRASOUND, UPPER GASTROINTESTINAL TRACT (GI), Esophagogastroduodenoscopy with biopsies;  Surgeon: Khoi Gomes MD;  Location: [facility] OR     ESOPHAGOSCOPY, GASTROSCOPY, DUODENOSCOPY (EGD), COMBINED N/A 2022    Procedure: ESOPHAGOGASTRODUODENOSCOPY,  NEEDLE  WITH ENDOSCOPIC ULTRASOUND GUIDANCE;  Surgeon: Khoi Gomes MD;  Location: [facility] OR     EXTRACORPOREAL SHOCK WAVE LITHOTRIPSY (ESWL)       GASTRIC BYPASS       NH CYSTO/URETERO W/LITHOTRIPSY &INDWELL STENT INSRT Left 2018    Procedure: CYSTOSCOPY, LEFT URETEROSCOPY LASER LITHOTRIPSY STENT INSERTION;  Surgeon: Skyler Delvalle MD;  Location: [facility] OR;  Service: Urology     NH LAP GASTRIC BYPASS/DERICK-EN-Y N/A 2016    Procedure: GASTRIC BYPASS LAPAROSCOPIC DERICK-EN-Y;  Surgeon: Randy Sutherland MD;  Location: [facility] OR;  Service: General     TUBAL LIGATION       WISDOM TOOTH EXTRACTION Bilateral        SOCIAL HISTORY:   Social History     Tobacco Use     Smoking status: Former     Smokeless tobacco: Never     Tobacco comments:     vapes marijuana   Vaping Use     Vaping Use: Never used   Substance Use Topics     Alcohol use: Yes     Comment: occ.     Drug use: Yes     Types: Marijuana     FAMILY HISTORY:   Family History   Problem Relation Age of Onset     Depression Paternal Grandmother      Diabetes Mother      Diabetes Father      Hypertension Father      Chronic Obstructive Pulmonary Disease Father      Cancer Maternal Grandmother         gastric     Urolithiasis Maternal Grandmother      Heart Disease Maternal Grandfather      Cancer Maternal Grandfather         lung     Heart Disease Paternal Grandfather      Breast Cancer No family hx of      Colon Cancer No family hx of      Prostate Cancer No family hx of      Cerebrovascular Disease No family hx of      Clotting Disorder No family hx of      Gout No family hx of      ALLERGIES:   Allergies   Allergen Reactions     Bee Venom Swelling     Ibuprofen Hives and Swelling     CURRENT MEDICATIONS:   Current Outpatient Medications   Medication Instructions     acetaminophen (TYLENOL) 650 mg, Oral, EVERY 4 HOURS PRN     buPROPion (WELLBUTRIN SR) 150 mg, Oral     calcitRIOL (ROCALTROL) 0.5 MCG capsule No dose, route, or frequency recorded.     calcium carbonate 500 mg (elemental) (OSCAL) 500 mg, Oral, 2 TIMES DAILY     clonazePAM (KLONOPIN) 0.5 mg, Oral, 2 TIMES DAILY PRN     diazepam (VALIUM) 5 mg, Oral, ONCE PRN     fludrocortisone (FLORINEF) 0.2 mg, Oral, DAILY     FLUoxetine (PROZAC) 60 mg, Oral, DAILY     gabapentin (NEURONTIN) 900 mg, Oral, 3 TIMES DAILY     hydrocortisone (CORTEF) 10 MG tablet Take 3 tablets (30 MG) every morning and take 2 tablets (20 MG) by mouth every day at 2 PM. Additional as directed.     levothyroxine (SYNTHROID/LEVOTHROID) 125 mcg, Oral, DAILY     ondansetron (ZOFRAN) 8 mg, Oral, EVERY 8 HOURS PRN     prochlorperazine  (COMPAZINE) 5 mg, Oral, EVERY 8 HOURS PRN     temazepam (RESTORIL) 15 mg, Oral, AT BEDTIME PRN     UNABLE TO FIND Other, medical cannabis (Patient's own supply. Not a prescription)       PHYSICAL EXAMINATION:  Vital signs: There were no vitals taken for this visit.   Gen: NAD, on video no distress  No other exam    Recent Labs   Lab Test 10/25/22  1003 09/29/22  0758 05/12/22  1017 03/24/22  1102 03/24/22  0728    141  141 139 138 140   POTASSIUM 4.2 4.2  4.3 4.4 3.6 3.9   CHLORIDE 104 104  104 107 102 107   CO2 27 27  27 27 29 30   ANIONGAP 8 10  10 5 7 3   BUN 9.1 9.2  9.6 6* 12 12   CR 0.75 0.82  0.80 0.72 0.80 0.78   GLC 91 91  92 86 70 88   SHASHA 9.1 8.4*  8.4* 9.1 8.8 9.1     No results for input(s): MAG, PHOS in the last 27608 hours.  Recent Labs   Lab Test 11/21/22  1527 11/07/22  1326 10/25/22  1003 09/29/22  0758 05/12/22  1017 03/24/22  0822   WBC 5.3 5.0 3.5* 3.7* 4.0 4.3   HGB 12.1 12.4 12.5 11.7 12.7 13.8    257 253 255 226 215   MCV 85 85 85 86 89 92   NEUTROPHIL  --  60 54 56 65 54     Recent Labs   Lab Test 10/25/22  1003 09/29/22  0758 05/12/22  1017   BILITOTAL 0.3 0.2 0.2   ALKPHOS 139* 145* 138   ALT 13 20 29   AST 20 20 21   ALBUMIN 4.3 3.9 3.5     TSH   Date Value Ref Range Status   01/09/2023 1.98 0.30 - 4.20 uIU/mL Final   10/25/2022 3.14 0.30 - 4.20 uIU/mL Final   09/29/2022 1.82 0.30 - 4.20 uIU/mL Final   05/12/2022 0.16 (L) 0.40 - 4.00 mU/L Final   03/24/2022 0.08 (L) 0.40 - 4.00 mU/L Final   02/08/2022 0.29 (L) 0.40 - 4.00 mU/L Final   07/05/2021 0.22 (L) 0.40 - 4.00 mU/L Final   06/04/2021 0.44 0.40 - 4.00 mU/L Final   05/10/2021 0.18 (L) 0.40 - 4.00 mU/L Final        ASSESSMENT AND PLAN: A 37 year old  female with right-sided functioning high-risk adrenocortical carcinoma.     Adrenocortical carcinoma:  - Started on adjuvant mitotane in July 2018 based on her presentation and tumor characteristics including invasion of the adrenal capsule, high mitotic rate,  possibly positive margins, and high Ki67, which could result in a rate of recurrence as high as 40%.    The mitotane was stopped by Dr. Liang and endocrinologic oncologist from [facility] in May 2021.       She is being followed by Dr. Jaquez in endocrinology. She held her cortisone and levothyroxine after consultation in endocrinology. She noticed sore throat and headaches for 2 weeks. She initial felt this was a viral illness but then it persisted. She restarted her cortisone and it helped.    She did not have labs or scans prior to this visit. She explained that she would have scans in June/July at the earliest.     She was noted to have some tremors. She could not explain if these were at rest or intention tremors. These do not bother her at all and were noticed by her PCP during the visit. She gets tremors after taking coffee. I will refer her to neurology if she has worsening tremors. She is on Ritalin and fluoxetine which together increase risk of tremors.     She does not have questions or concerns at this visit. I will see her in 4 months with labs and restaging scans.         Video-Visit Details    Type of service:  Video Visit  Originating Location (pt. Location): Home  Distant Location (provider location):  [facility]   Platform used for Video Visit: [email or web address]    25 minutes spent on the date of the encounter doing chart review, history and exam, documentation and further activities as noted above      Tru Hernadez    Hematologist and Medical Oncologist  [facility]

## 2023-03-01 NOTE — PROGRESS NOTES
Video-Visit Details    Type of service:  Video Visit  Originating Location (pt. Location): Home    Distant Location (provider location):  Off-site    Mode of Communication:  Video Conference via [employer]

## 2023-03-21 ENCOUNTER — OFFICE VISIT (OUTPATIENT)
Dept: FAMILY MEDICINE | Facility: CLINIC | Age: 38
End: 2023-03-21
Payer: COMMERCIAL

## 2023-03-21 VITALS
OXYGEN SATURATION: 100 % | WEIGHT: 148.4 LBS | DIASTOLIC BLOOD PRESSURE: 62 MMHG | TEMPERATURE: 99 F | BODY MASS INDEX: 25.33 KG/M2 | HEIGHT: 64 IN | HEART RATE: 96 BPM | SYSTOLIC BLOOD PRESSURE: 102 MMHG

## 2023-03-21 DIAGNOSIS — Z01.818 PREOP GENERAL PHYSICAL EXAM: Primary | ICD-10-CM

## 2023-03-21 DIAGNOSIS — F33.1 MAJOR DEPRESSIVE DISORDER, RECURRENT EPISODE, MODERATE WITH ANXIOUS DISTRESS (H): ICD-10-CM

## 2023-03-21 DIAGNOSIS — E27.40 ADRENAL INSUFFICIENCY (H): ICD-10-CM

## 2023-03-21 PROCEDURE — 99214 OFFICE O/P EST MOD 30 MIN: CPT | Performed by: FAMILY MEDICINE

## 2023-03-21 ASSESSMENT — PATIENT HEALTH QUESTIONNAIRE - PHQ9
SUM OF ALL RESPONSES TO PHQ QUESTIONS 1-9: 11
10. IF YOU CHECKED OFF ANY PROBLEMS, HOW DIFFICULT HAVE THESE PROBLEMS MADE IT FOR YOU TO DO YOUR WORK, TAKE CARE OF THINGS AT HOME, OR GET ALONG WITH OTHER PEOPLE: SOMEWHAT DIFFICULT
SUM OF ALL RESPONSES TO PHQ QUESTIONS 1-9: 11

## 2023-03-21 ASSESSMENT — PAIN SCALES - GENERAL: PAINLEVEL: MILD PAIN (3)

## 2023-03-21 NOTE — PATIENT INSTRUCTIONS
For informational purposes only. Not to replace the advice of your health care provider. Copyright   2003,  [employer] [facility]. All rights reserved. Clinically reviewed by Hina Salazar MD. Zhilian Zhaopin [id number] - REV .  Preparing for Your Surgery  Getting started  A nurse will call you to review your health history and instructions. They will give you an arrival time based on your scheduled surgery time. Please be ready to share:    Your doctor's clinic name and phone number    Your medical, surgical, and anesthesia history    A list of allergies and sensitivities    A list of medicines, including herbal treatments and over-the-counter drugs    Whether the patient has a legal guardian (ask how to send us the papers in advance)  Please tell us if you're pregnant--or if there's any chance you might be pregnant. Some surgeries may injure a fetus (unborn baby), so they require a pregnancy test. Surgeries that are safe for a fetus don't always need a test, and you can choose whether to have one.   If you have a child who's having surgery, please ask for a copy of Preparing for Your Child's Surgery.    Preparing for surgery    Within 10 to 30 days of surgery: Have a pre-op exam (sometimes called an H&P, or History and Physical). This can be done at a clinic or pre-operative center.  ? If you're having a , you may not need this exam. Talk to your care team.    At your pre-op exam, talk to your care team about all medicines you take. If you need to stop any medicines before surgery, ask when to start taking them again.  ? We do this for your safety. Many medicines can make you bleed too much during surgery. Some change how well surgery (anesthesia) drugs work.    Call your insurance company to let them know you're having surgery. (If you don't have insurance, call [phone].)    Call your clinic if there's any change in your health. This includes signs of a cold or flu (sore throat, runny nose,  cough, rash, fever). It also includes a scrape or scratch near the surgery site.    If you have questions on the day of surgery, call your hospital or surgery center.  Eating and drinking guidelines  For your safety: Unless your surgeon tells you otherwise, follow the guidelines below.    Eat and drink as usual until 8 hours before you arrive for surgery. After that, no food or milk.    Drink clear liquids until 2 hours before you arrive. These are liquids you can see through, like water, Gatorade, and Propel Water. They also include plain black coffee and tea (no cream or milk), candy, and breath mints. You can spit out gum when you arrive.    If you drink alcohol: Stop drinking it the night before surgery.    If your care team tells you to take medicine on the morning of surgery, it's okay to take it with a sip of water.  Preventing infection    Shower or bathe the night before and morning of your surgery. Follow the instructions your clinic gave you. (If no instructions, use regular soap.)    Don't shave or clip hair near your surgery site. We'll remove the hair if needed.    Don't smoke or vape the morning of surgery. You may chew nicotine gum up to 2 hours before surgery. A nicotine patch is okay.  ? Note: Some surgeries require you to completely quit smoking and nicotine. Check with your surgeon.    Your care team will make every effort to keep you safe from infection. We will:  ? Clean our hands often with soap and water (or an alcohol-based hand rub).  ? Clean the skin at your surgery site with a special soap that kills germs.  ? Give you a special gown to keep you warm. (Cold raises the risk of infection.)  ? Wear special hair covers, masks, gowns and gloves during surgery.  ? Give antibiotic medicine, if prescribed. Not all surgeries need antibiotics.  What to bring on the day of surgery    Photo ID and insurance card    Copy of your health care directive, if you have one    Glasses and hearing aids (bring  cases)  ? You can't wear contacts during surgery    Inhaler and eye drops, if you use them (tell us about these when you arrive)    CPAP machine or breathing device, if you use them    A few personal items, if spending the night    If you have . . .  ? A pacemaker, ICD (cardiac defibrillator) or other implant: Bring the ID card.  ? An implanted stimulator: Bring the remote control.  ? A legal guardian: Bring a copy of the certified (court-stamped) guardianship papers.  Please remove any jewelry, including body piercings. Leave jewelry and other valuables at home.  If you're going home the day of surgery    You must have a responsible adult drive you home. They should stay with you overnight as well.    If you don't have someone to stay with you, and you aren't safe to go home alone, we may keep you overnight. Insurance often won't pay for this.  After surgery  If it's hard to control your pain or you need more pain medicine, please call your surgeon's office.  Questions?   If you have any questions for your care team, list them here: _________________________________________________________________________________________________________________________________________________________________________ ____________________________________ ____________________________________ ____________________________________

## 2023-03-21 NOTE — PROGRESS NOTES
[address] [address]  Phone: [phone]  Fax: [phone]  Primary Provider: Ivette Leal  Pre-op Performing Provider: KEVIN MONROE    PREOPERATIVE EVALUATION:  Today's date: 3/21/2023    Suzy Rodríguez is a 37 year old female who presents for a preoperative evaluation.    Surgical Information:  Surgery/Procedure: Esophagogastroduodenoscopy (EGD)   Surgery Location: [facility]   Surgeon: Abilio Gomes MD  Surgery Date: 03/29/2023  Time of Surgery: 7:30AM  Where patient plans to recover: At home with family  Fax number for surgical facility: Note does not need to be faxed, will be available electronically in Epic.    Type of Anesthesia Anticipated: General    Assessment & Plan     The proposed surgical procedure is considered LOW risk.    Preop general physical exam  Overall procedure low risk. No cardiac risk factors. Other recommendations as below.     Major depressive disorder, recurrent episode, moderate with anxious distress (H)  Chronic. Stable, following with provider. OK to take all medications the day of, for bupropion, fluoxetine. Patient will hold methylphenidate.     Adrenal insufficiency (H)  Chronic, stable. Does have low blood pressures and experiences some shakiness. Cortisol levels have normalized. Recommend following up closely with post operative glucose and blood pressure monitoring.         Risks and Recommendations:  The patient has the following additional risks and recommendations for perioperative complications:  Anemia/Bleeding/Clotting:    - History of DVT or PE, consider DVT prevention postoperatively   - Anemia and does not require treatment prior to surgery. Monitor hemoglobin postoperatively. Type and screen the day of.       Medication Instructions:  Patient is to take all scheduled medications on the day of surgery EXCEPT for modifications  listed below: methylphenidate    RECOMMENDATION:  APPROVAL GIVEN to proceed with proposed procedure, without further diagnostic evaluation. Will need post operative CBC, recommend type and screen, also close vitals monitoring and blood sugar assessment for concern of adrenal insufficiency. Follow up with endocrinology soon after.     Ordering of each unique test  Prescription drug management -   I spent a total of 22 minutes on the day of the visit.   Time spent doing chart review, history and exam, documentation and further activities per the note      Subjective     HPI related to upcoming procedure: Patient here for preoperative for bariatric surgery. Patient likes to work out, stays fairly active. She is able to do brisk walking.    Preop Questions 3/21/2023   1. Have you ever had a heart attack or stroke? No   2. Have you ever had surgery on your heart or blood vessels, such as a stent placement, a coronary artery bypass, or surgery on an artery in your head, neck, heart, or legs? No   3. Do you have chest pain with activity? No   4. Do you have a history of  heart failure? No   5. Do you currently have a cold, bronchitis or symptoms of other infection? YES - patient was exposed to COVID of Thursday of last week. Sunday, patient babysat a niece who was also exposed.   6. Do you have a cough, shortness of breath, or wheezing? No- does have some concerns about iron levels   7. Do you or anyone in your family have previous history of blood clots? YES - MTHFR mutation. Patient did have a blood clot intraoperatively, history of placental abruption.    8. Do you or does anyone in your family have a serious bleeding problem such as prolonged bleeding following surgeries or cuts? YES - patient does have a history of DVTs   9. Have you ever had problems with anemia or been told to take iron pills? YES -    10. Have you had any abnormal blood loss such as black, tarry or bloody stools, or abnormal vaginal bleeding? YES -  but not recently   11. Have you ever had a blood transfusion? YES - Last one 2018   11a. Have you ever had a transfusion reaction? No   12. Are you willing to have a blood transfusion if it is medically needed before, during, or after your surgery? Yes   13. Have you or any of your relatives ever had problems with anesthesia? No   14. Do you have sleep apnea, excessive snoring or daytime drowsiness? No   14a. Do you have a CPAP machine? -   15. Do you have any artifical heart valves or other implanted medical devices like a pacemaker, defibrillator, or continuous glucose monitor? No   16. Do you have artificial joints? No   17. Are you allergic to latex? No   18. Is there any chance that you may be pregnant? No       Health Care Directive:  Patient does not have a Health Care Directive or Living Will: Advance Directive received and scanned. Click on Code in the patient header to view.    Preoperative Review of :   reviewed - controlled substances reflected in medication list.    Review of Systems  CONSTITUTIONAL: NEGATIVE for fever, chills, change in weight  INTEGUMENTARY/SKIN: NEGATIVE for worrisome rashes, moles or lesions  EYES: NEGATIVE for vision changes or irritation  ENT/MOUTH: NEGATIVE for ear, mouth and throat problems and tongue sore  RESP:POSITIVE for SOB/dyspnea and NEGATIVE for cough-non productive  CV: NEGATIVE for chest pain, palpitations or peripheral edema  GI: POSITIVE for nausea and NEGATIVE for diarrhea, dyspepsia and dysphagia  : NEGATIVE for frequency, dysuria, or hematuria  MUSCULOSKELETAL: NEGATIVE for significant arthralgias or myalgia  NEURO: NEGATIVE for weakness, dizziness or paresthesias  ENDOCRINE: NEGATIVE for temperature intolerance, skin/hair changes  HEME/ALLERGY/IMMUNE: POSITIVE  for anemia and bleeding disorder  PSYCHIATRIC: NEGATIVE for changes in mood or affect    Patient Active Problem List    Diagnosis Date Noted     Attention deficit hyperactivity disorder, combined  type, moderate 10/25/2021     Priority: Medium     Major depressive disorder, recurrent episode, moderate with anxious distress (H) 05/19/2020     Priority: Medium     Adnexal cyst 09/24/2019     Priority: Medium     Hypothyroidism 12/01/2018     Priority: Medium     Hypocalcemia 12/01/2018     Priority: Medium     Herpes simplex infection of genitourinary system 11/30/2018     Priority: Medium     Generalized anxiety disorder 10/18/2018     Priority: Medium     Overview:   Created by Conversion    Overview:   Overview:   Created by Conversion  Overview:   Created by Conversion       Allergic urticaria 10/18/2018     Priority: Medium     Overview:   Created by Conversion       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     Priority: Medium     Primary adrenal insufficiency (H) 08/01/2018     Priority: Medium     Carcinoma, adrenal cortical (H) 07/18/2018     Priority: Medium     Malignant neoplasm of cortex of right adrenal gland (H) 06/25/2018     Priority: Medium     Cushing's syndrome (H) 06/18/2018     Priority: Medium     Overview:   Overview:   24 hr urine cortisol ~300 mcg/24 hr  Overview:   24 hr urine cortisol ~300 mcg/24 hr  Overview:   Overview:   24 hr urine cortisol ~300 mcg/24 hr       Hypertension due to endocrine disorder 06/18/2018     Priority: Medium     Calculus of kidney 05/09/2018     Priority: Medium     S/P gastric bypass 06/08/2016     Priority: Medium     Overview:   6.8.16 with Dr. Randy Sutherland.  Preop weight at intake was 230 lbs.       MTHFR mutation 04/21/2016     Priority: Medium     Overview:   Homozygous per patient  Overview:   Homozygous per patient        Past Medical History:   Diagnosis Date     Adrenal cortex cancer, right (H) 6/30/2018    Resected 6/25/18, Dr. Mansfield.     Adrenal mass (H)      Anemia     thought due to heavy menses.     Calculus of ureter 5/9/2018     Carcinoma, adrenal cortical (H) 7/18/2018     Chocolate cyst of ovary 2011     Clotting disorder (H)      Depression       "GERD (gastroesophageal reflux disease)      History of miscarriage      Hypertension due to endocrine disorder 2018     Kidney stones     passed on their own     Malignant neoplasm of cortex of right adrenal gland (H) 2018    EOTD; 18.  Check in May. SCP started.  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     SHREYA on CPAP 2015    Stopped using CPAP the  after weight loss as even at lower pressure/adjustment couldn't tolerate the cpap. Resting well, cautioned to watch for any signs of fatigue and consider \"titration study\" in the future to see if cpap is still required at her new weight.     Pneumonia     hx of recurrent pneumonia issues/respiratory infections.     Polycystic ovary syndrome     able to get pregnant, not on meds.     Pulmonary embolism (H)  or so    briefly on wafarin.     Sleep apnea     cpap     Vitamin D deficiency      Past Surgical History:   Procedure Laterality Date     ADRENALECTOMY Right 2018    Procedure: ADRENALECTOMY;  Right Adrenalectomy,  Embolize Liver , Anesthesia Block;  Surgeon: Warren Mansfield MD;  Location: [facility] OR     ADRENALECTOMY       ADRENALECTOMY Right       SECTION      twice      SECTION      2      SECTION      x2     EMBOLECTOMY ABDOMEN N/A 2018    Procedure: EMBOLECTOMY ABDOMEN;;  Surgeon: Ector Mcintyre MD;  Location: [facility] OR     ENDOSCOPIC ULTRASOUND UPPER GASTROINTESTINAL TRACT (GI) N/A 2023    Procedure: ENDOSCOPIC ULTRASOUND, ESOPHAGOSCOPY / UPPER " GASTROINTESTINAL TRACT (GI), with cystgastrostomy stent placement and dilation;  Surgeon: Khoi Gomes MD;  Location: [facility] OR     ENDOSCOPIC ULTRASOUND UPPER GASTROINTESTINAL TRACT (GI) N/A 2/8/2023    Procedure: ENDOSCOPIC ULTRASOUND, UPPER GASTROINTESTINAL TRACT (GI), Esophagogastroduodenoscopy with biopsies;  Surgeon: Khoi Gomes MD;  Location: [facility] OR     ESOPHAGOSCOPY, GASTROSCOPY, DUODENOSCOPY (EGD), COMBINED N/A 12/13/2022    Procedure: ESOPHAGOGASTRODUODENOSCOPY,  NEEDLE  WITH ENDOSCOPIC ULTRASOUND GUIDANCE;  Surgeon: Khoi Gomes MD;  Location: [facility] OR     EXTRACORPOREAL SHOCK WAVE LITHOTRIPSY (ESWL)       GASTRIC BYPASS  2016     OK CYSTO/URETERO W/LITHOTRIPSY &INDWELL STENT INSRT Left 5/11/2018    Procedure: CYSTOSCOPY, LEFT URETEROSCOPY LASER LITHOTRIPSY STENT INSERTION;  Surgeon: Skyler Delvalle MD;  Location: [facility] OR;  Service: Urology     OK LAP GASTRIC BYPASS/DERICK-EN-Y N/A 6/8/2016    Procedure: GASTRIC BYPASS LAPAROSCOPIC DERICK-EN-Y;  Surgeon: Randy Sutherland MD;  Location: [facility] OR;  Service: General     TUBAL LIGATION       WISDOM TOOTH EXTRACTION Bilateral      Current Outpatient Medications   Medication Sig Dispense Refill     acetaminophen (TYLENOL) 325 MG tablet Take 2 tablets (650 mg) by mouth every 4 hours as needed for other (multimodal surgical pain management along with NSAIDS and opioid medication as indicated based on pain control and physical function.) 150 tablet 0     buPROPion (WELLBUTRIN SR) 150 MG 12 hr tablet Take 1 tablet (150 mg) by mouth 3 times daily 270 tablet 1     clonazePAM (KLONOPIN) 0.5 MG tablet Take 1 tablet (0.5 mg) by mouth 2 times daily as needed for anxiety 60 tablet 0     FLUoxetine (PROZAC) 20 MG capsule Take 3 capsules (60 mg) by mouth 3 times daily 270 capsule 1     levothyroxine (SYNTHROID/LEVOTHROID) 75 MCG tablet Take 1 tablet (75 mcg) by mouth daily 90 tablet 1     methylphenidate (RITALIN) 20 MG tablet Take  "20 mg by mouth 2 times daily       temazepam (RESTORIL) 15 MG capsule Take 1-2 capsules (15-30 mg) by mouth nightly as needed for sleep 60 capsule 5       Allergies   Allergen Reactions     Bee Venom Swelling     Ibuprofen Hives and Swelling        Social History     Tobacco Use     Smoking status: Former     Smokeless tobacco: Never     Tobacco comments:     vapes marijuana   Substance Use Topics     Alcohol use: Yes     Comment: occ.     Family History   Problem Relation Age of Onset     Depression Paternal Grandmother      Diabetes Mother      Diabetes Father      Hypertension Father      Chronic Obstructive Pulmonary Disease Father      Cancer Maternal Grandmother         gastric     Urolithiasis Maternal Grandmother      Heart Disease Maternal Grandfather      Cancer Maternal Grandfather         lung     Heart Disease Paternal Grandfather      Breast Cancer No family hx of      Colon Cancer No family hx of      Prostate Cancer No family hx of      Cerebrovascular Disease No family hx of      Clotting Disorder No family hx of      Gout No family hx of      History   Drug Use     Types: Marijuana         Objective     /62   Pulse 96   Temp 99  F (37.2  C) (Oral)   Ht 1.626 m (5' 4\")   Wt 67.3 kg (148 lb 6.4 oz)   SpO2 100%   BMI 25.47 kg/m      Physical Exam    GENERAL APPEARANCE: healthy, alert and no distress     EYES: EOMI, PERRL     HENT: ear canals and TM's normal and nose and mouth without ulcers or lesions     NECK: no adenopathy, no asymmetry, masses, or scars and thyroid normal to palpation     RESP: lungs clear to auscultation - no rales, rhonchi or wheezes     CV: regular rates and rhythm, normal S1 S2, no S3 or S4 and no murmur, click or rub     ABDOMEN:  soft, nontender, no HSM or masses and bowel sounds normal     MS: extremities normal- no gross deformities noted, no evidence of inflammation in joints, FROM in all extremities.     SKIN: no suspicious lesions or rashes     NEURO: Normal " strength and tone, sensory exam grossly normal, mentation intact and speech normal     PSYCH: mentation appears normal. and affect normal/bright     LYMPHATICS: No cervical adenopathy    Recent Labs   Lab Test 11/21/22  1527 11/07/22  1326 10/25/22  1003 09/29/22  0758   HGB 12.1 12.4 12.5 11.7    257 253 255   NA  --   --  139 141  141   POTASSIUM  --   --  4.2 4.2  4.3   CR  --   --  0.75 0.82  0.80        Diagnostics:  No labs were ordered during this visit.   No EKG required for low risk surgery (cataract, skin procedure, breast biopsy, etc).    Revised Cardiac Risk Index (RCRI):  The patient has the following serious cardiovascular risks for perioperative complications:   - No serious cardiac risks = 0 points     RCRI Interpretation: 0 points: Class I (very low risk - 0.4% complication rate)           Signed Electronically by: Lauren Connors DO  Copy of this evaluation report is provided to requesting physician.      Answers for HPI/ROS submitted by the patient on 3/21/2023  If you checked off any problems, how difficult have these problems made it for you to do your work, take care of things at home, or get along with other people?: Somewhat difficult  PHQ9 TOTAL SCORE: 11

## 2023-03-23 ENCOUNTER — TELEPHONE (OUTPATIENT)
Dept: ENDOCRINOLOGY | Facility: CLINIC | Age: 38
End: 2023-03-23
Payer: COMMERCIAL

## 2023-03-23 DIAGNOSIS — R79.9 ABNORMAL FINDING OF BLOOD CHEMISTRY, UNSPECIFIED: ICD-10-CM

## 2023-03-23 DIAGNOSIS — E27.40 ADRENAL INSUFFICIENCY (H): Primary | ICD-10-CM

## 2023-03-23 RX ORDER — HYDROCORTISONE 5 MG/1
TABLET ORAL
Qty: 60 TABLET | Refills: 1
Start: 2023-03-23 | End: 2023-11-27

## 2023-03-23 NOTE — TELEPHONE ENCOUNTER
Pt reports feeling dizziness upon standing    Orders Placed This Encounter   Procedures     Cortisol     Basic metabolic panel

## 2023-03-24 ENCOUNTER — LAB (OUTPATIENT)
Dept: LAB | Facility: CLINIC | Age: 38
End: 2023-03-24
Payer: COMMERCIAL

## 2023-03-24 ENCOUNTER — ALLIED HEALTH/NURSE VISIT (OUTPATIENT)
Dept: FAMILY MEDICINE | Facility: CLINIC | Age: 38
End: 2023-03-24
Payer: COMMERCIAL

## 2023-03-24 VITALS — SYSTOLIC BLOOD PRESSURE: 117 MMHG | HEART RATE: 83 BPM | DIASTOLIC BLOOD PRESSURE: 74 MMHG | OXYGEN SATURATION: 98 %

## 2023-03-24 DIAGNOSIS — E27.40 ADRENAL INSUFFICIENCY (H): ICD-10-CM

## 2023-03-24 DIAGNOSIS — I15.2 HYPERTENSION DUE TO ENDOCRINE DISORDER: Primary | ICD-10-CM

## 2023-03-24 DIAGNOSIS — K91.2 POSTOPERATIVE MALABSORPTION: ICD-10-CM

## 2023-03-24 DIAGNOSIS — R79.9 ABNORMAL FINDING OF BLOOD CHEMISTRY, UNSPECIFIED: ICD-10-CM

## 2023-03-24 DIAGNOSIS — K90.9 INTESTINAL MALABSORPTION, UNSPECIFIED TYPE: ICD-10-CM

## 2023-03-24 DIAGNOSIS — E03.8 SECONDARY HYPOTHYROIDISM: ICD-10-CM

## 2023-03-24 DIAGNOSIS — C74.01 MALIGNANT NEOPLASM OF CORTEX OF RIGHT ADRENAL GLAND (H): ICD-10-CM

## 2023-03-24 LAB
ALBUMIN SERPL BCG-MCNC: 4.1 G/DL (ref 3.5–5.2)
ALP SERPL-CCNC: 140 U/L (ref 35–104)
ALT SERPL W P-5'-P-CCNC: 23 U/L (ref 10–35)
ANION GAP SERPL CALCULATED.3IONS-SCNC: 12 MMOL/L (ref 7–15)
AST SERPL W P-5'-P-CCNC: 22 U/L (ref 10–35)
BILIRUB SERPL-MCNC: 0.2 MG/DL
BUN SERPL-MCNC: 12 MG/DL (ref 6–20)
CALCIUM SERPL-MCNC: 9.3 MG/DL (ref 8.6–10)
CHLORIDE SERPL-SCNC: 105 MMOL/L (ref 98–107)
CORTIS SERPL-MCNC: 20.1 UG/DL
CREAT SERPL-MCNC: 0.86 MG/DL (ref 0.51–0.95)
DEPRECATED HCO3 PLAS-SCNC: 24 MMOL/L (ref 22–29)
ERYTHROCYTE [DISTWIDTH] IN BLOOD BY AUTOMATED COUNT: 18.1 % (ref 10–15)
FERRITIN SERPL-MCNC: 29 NG/ML (ref 6–175)
FOLATE SERPL-MCNC: >40 NG/ML (ref 4.6–34.8)
GFR SERPL CREATININE-BSD FRML MDRD: 88 ML/MIN/1.73M2
GLUCOSE SERPL-MCNC: 96 MG/DL (ref 70–99)
HBA1C MFR BLD: 4.8 % (ref 0–5.6)
HCT VFR BLD AUTO: 42 % (ref 35–47)
HGB BLD-MCNC: 13.2 G/DL (ref 11.7–15.7)
IRON BINDING CAPACITY (ROCHE): 265 UG/DL (ref 240–430)
IRON SATN MFR SERPL: 15 % (ref 15–46)
IRON SERPL-MCNC: 39 UG/DL (ref 37–145)
MCH RBC QN AUTO: 26.4 PG (ref 26.5–33)
MCHC RBC AUTO-ENTMCNC: 31.4 G/DL (ref 31.5–36.5)
MCV RBC AUTO: 84 FL (ref 78–100)
PLATELET # BLD AUTO: 197 10E3/UL (ref 150–450)
POTASSIUM SERPL-SCNC: 4.1 MMOL/L (ref 3.4–5.3)
PROT SERPL-MCNC: 6 G/DL (ref 6.4–8.3)
PTH-INTACT SERPL-MCNC: 43 PG/ML (ref 15–65)
RBC # BLD AUTO: 5 10E6/UL (ref 3.8–5.2)
SODIUM SERPL-SCNC: 141 MMOL/L (ref 136–145)
T4 FREE SERPL-MCNC: 1.25 NG/DL (ref 0.9–1.7)
TSH SERPL DL<=0.005 MIU/L-ACNC: 2.26 UIU/ML (ref 0.3–4.2)
WBC # BLD AUTO: 7.2 10E3/UL (ref 4–11)

## 2023-03-24 PROCEDURE — 83036 HEMOGLOBIN GLYCOSYLATED A1C: CPT

## 2023-03-24 PROCEDURE — 85027 COMPLETE CBC AUTOMATED: CPT

## 2023-03-24 PROCEDURE — 84425 ASSAY OF VITAMIN B-1: CPT | Mod: 90

## 2023-03-24 PROCEDURE — 84630 ASSAY OF ZINC: CPT | Mod: 90

## 2023-03-24 PROCEDURE — 99000 SPECIMEN HANDLING OFFICE-LAB: CPT

## 2023-03-24 PROCEDURE — 84439 ASSAY OF FREE THYROXINE: CPT

## 2023-03-24 PROCEDURE — 83550 IRON BINDING TEST: CPT

## 2023-03-24 PROCEDURE — 82607 VITAMIN B-12: CPT

## 2023-03-24 PROCEDURE — 82306 VITAMIN D 25 HYDROXY: CPT

## 2023-03-24 PROCEDURE — 82728 ASSAY OF FERRITIN: CPT

## 2023-03-24 PROCEDURE — 80053 COMPREHEN METABOLIC PANEL: CPT

## 2023-03-24 PROCEDURE — 99207 PR NO CHARGE NURSE ONLY: CPT

## 2023-03-24 PROCEDURE — 83970 ASSAY OF PARATHORMONE: CPT

## 2023-03-24 PROCEDURE — 36415 COLL VENOUS BLD VENIPUNCTURE: CPT

## 2023-03-24 PROCEDURE — 83540 ASSAY OF IRON: CPT

## 2023-03-24 PROCEDURE — 84443 ASSAY THYROID STIM HORMONE: CPT

## 2023-03-24 PROCEDURE — 82746 ASSAY OF FOLIC ACID SERUM: CPT

## 2023-03-24 PROCEDURE — 82533 TOTAL CORTISOL: CPT

## 2023-03-24 PROCEDURE — 84590 ASSAY OF VITAMIN A: CPT | Mod: 90

## 2023-03-24 NOTE — PROGRESS NOTES
I met with Suzy Rodríguez at the request of Dr. Panchal to recheck her blood pressure.  Blood pressure medications on the med list were reviewed with  patient.    Patient has taken all medications as per usual regimen: NA  Patient reports tolerating them without any issues or concerns: NA    Vitals:    03/24/23 0748   BP: 117/74   BP Location: Left arm   Patient Position: Sitting   Cuff Size: Adult Regular   Pulse: 83   SpO2: 98%       Blood pressure was taken, previous encounter was reviewed, recorded blood pressure below 140/90.  Patient was discharged and the note will be sent to the provider for final review.

## 2023-03-25 LAB
DEPRECATED CALCIDIOL+CALCIFEROL SERPL-MC: 37 UG/L (ref 20–75)
VIT B12 SERPL-MCNC: 3528 PG/ML (ref 232–1245)

## 2023-03-26 LAB — ZINC SERPL-MCNC: 85 UG/DL

## 2023-03-27 ENCOUNTER — PATIENT OUTREACH (OUTPATIENT)
Dept: GASTROENTEROLOGY | Facility: CLINIC | Age: 38
End: 2023-03-27
Payer: COMMERCIAL

## 2023-03-27 LAB
ANNOTATION COMMENT IMP: NORMAL
RETINYL PALMITATE SERPL-MCNC: <0.02 MG/L
VIT A SERPL-MCNC: 0.43 MG/L

## 2023-03-28 LAB — VIT B1 PYROPHOSHATE BLD-SCNC: 144 NMOL/L

## 2023-03-30 ENCOUNTER — TELEPHONE (OUTPATIENT)
Dept: ENDOCRINOLOGY | Facility: CLINIC | Age: 38
End: 2023-03-30
Payer: COMMERCIAL

## 2023-03-30 NOTE — RESULT ENCOUNTER NOTE
Dear Suzy    Your cortisol level is normal. Your metabolic panel shows a slight alkaline phosphatase elevation similiar to previous levels.     I am not why you are dizzy- I will defer to Dr. Jaquez, whom you tom be seeing shortly.    If you have any questions, please feel free to contact my nurse at [phone] select option #3 for triage nurse  or  option #1 for scheduling related questions.    Regards    Fannie Mandujano MD

## 2023-03-30 NOTE — TELEPHONE ENCOUNTER
Triage received refill request from [facility] stating that patient lost her hard copy script for Trazadone from 3/12/19 #30 with 0 refills.     Refill request routed to Dr. Cintron.    Abi Crook RN   [facility]     Quality 137: Melanoma: Continuity Of Care - Recall System: Patient information entered into a recall system that includes: target date for the next exam specified AND a process to follow up with patients regarding missed or unscheduled appointments Detail Level: Simple When Should The Patient Follow-Up For Their Next Full-Body Skin Exam?: 3 Months Detail Level: Generalized Detail Level: Detailed

## 2023-03-31 NOTE — RESULT ENCOUNTER NOTE
Lalo Almonte!    Please keep your scheduled f/up apt, to review your symptoms and lab results in detail.

## 2023-04-06 ENCOUNTER — TELEPHONE (OUTPATIENT)
Dept: ENDOCRINOLOGY | Facility: CLINIC | Age: 38
End: 2023-04-06

## 2023-04-06 ENCOUNTER — VIRTUAL VISIT (OUTPATIENT)
Dept: ENDOCRINOLOGY | Facility: CLINIC | Age: 38
End: 2023-04-06
Payer: COMMERCIAL

## 2023-04-06 DIAGNOSIS — Z98.84 GASTRIC BYPASS STATUS FOR OBESITY: ICD-10-CM

## 2023-04-06 DIAGNOSIS — E03.8 SECONDARY HYPOTHYROIDISM: ICD-10-CM

## 2023-04-06 DIAGNOSIS — C74.01 ADRENAL CORTEX CANCER, RIGHT (H): Primary | ICD-10-CM

## 2023-04-06 DIAGNOSIS — R74.8 ELEVATED ALKALINE PHOSPHATASE LEVEL: ICD-10-CM

## 2023-04-06 PROCEDURE — 99215 OFFICE O/P EST HI 40 MIN: CPT | Mod: VID | Performed by: INTERNAL MEDICINE

## 2023-04-06 ASSESSMENT — ENCOUNTER SYMPTOMS
SLEEP DISTURBANCES DUE TO BREATHING: 0
RECTAL PAIN: 0
DIARRHEA: 0
SINUS CONGESTION: 1
SMELL DISTURBANCE: 0
PALPITATIONS: 1
ORTHOPNEA: 0
NIGHT SWEATS: 0
CHILLS: 0
LIGHT-HEADEDNESS: 1
SINUS PAIN: 0
INCREASED ENERGY: 0
EYE IRRITATION: 0
TASTE DISTURBANCE: 0
TROUBLE SWALLOWING: 0
VOMITING: 0
HYPOTENSION: 1
POLYDIPSIA: 0
BOWEL INCONTINENCE: 0
DECREASED APPETITE: 1
DOUBLE VISION: 0
BLOATING: 0
HEARTBURN: 1
NECK MASS: 0
EYE PAIN: 0
EYE WATERING: 0
SORE THROAT: 1
POLYPHAGIA: 0
LEG PAIN: 0
FEVER: 1
CONSTIPATION: 1
NAUSEA: 1
HOARSE VOICE: 1
ALTERED TEMPERATURE REGULATION: 0
JAUNDICE: 0
EYE REDNESS: 0
ABDOMINAL PAIN: 0
BLOOD IN STOOL: 0
FATIGUE: 0
SYNCOPE: 0
WEIGHT LOSS: 1
EXERCISE INTOLERANCE: 0
HYPERTENSION: 0
HALLUCINATIONS: 0

## 2023-04-06 NOTE — LETTER
"4/6/2023       RE: Suzy Rodríguez  [address]  [facility] [address]     Dear Colleague,    Thank you for referring your patient, Suzy Rodríguez, to the [facility] [place] at [facility]. Please see a copy of my visit note below.    Video-Visit Details    Type of service:  Video Visit    Video Start Time (time video started): 10:03 am   Video End Time (time video stopped): 10:20 am     Originating Location (pt. Location): Home  Distant Location (provider location): Off-site    Mode of Communication:  Video Conference via [facility]    The patient is seen in f/up for adrenocortical cancer.     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  The adrenal mass was first identified in May 2018, on an abdominal CT done for evaluation of kidney stones.  The mass measured 9.2 x 8 cm on the noncontrast CT from 5/8/18, and was inseparable from the liver and upper pole of the right kidney.  The chest CT from 6/14/18 identified 2 indeterminate solid pulmonary nodules, with the largest measuring 3 mm in the subpleural posterior left lower lobe.  The patient underwent right adrenalectomy on 6/25/18.  Surgery was complicated by diaphragmatic injury during hepatic mobilization and small fracture of the right liver lobe, managed conservatively.  The pathology report (re read at [facility]) revealed the following:  \"Adrenal cortical carcinoma, low grade, oncocytic type, forming a mass 11.3 cm and weighing 412 grams (per report). Surgical resection margins are positive for tumor (per report). Lymphovascular invasion is focally present. The tumor seems to be confined to the adrenal gland.  AJCC Stage (with available surgical materials): pT2NX (8th edition, 2017). Immunoperoxidase stains were " "performed on paraffin sections at the referring institution and reviewed at [facility] in [place], [place].  Tumor cells are positive for calretinin, synaptophysin and Melan A, supporting the above diagnosis. Ki-67 reveal an increased proliferation index of approximately 20% in the most active areas.\"    Treatment with mitotane was started in July 2018 and she completed radiation to the adrenal bed on October 8, 2018.  Titrating up the dose of mitotane was difficult. It was discontinued by Dr. Liang from [facility], in May 2021. The Mitotane level from 5/12/22 was undetectable.  Hydrocortisone was tapered down and discontinued in October 2022.  24-hour urinary cortisol from November 2022 was within normal limits.  DHEA-S remains undetectable on lab work from November 2022.    A couple of weeks ago, the patient reported experiencing episodes of lightheadedness, occurring mainly in the morning, randomly, and relieved by sitting down for 20 minutes or so.  Dr. Mandujano checked the morning cortisol and it was normal at 20.  She reports not having breakfast and having a few cups of coffee in the morning at that time.  Adderall was switched to Ritalin, which has affected her appetite.  Approximately 2 months ago she changed her diet to a vegetarian diet, but recently, she has been reintroducing meat.  Since having breakfast in the morning and significantly decreasing the caffeine intake, her symptoms have resolved.    Most recent chest abdomen and pelvic CT from October 2022 was remarkable for a duodenal mass, which proved to be a duodenal polyp, removed in February 2023.    As a result of treatment with mitotane, she developed secondary hypothyroidism, which was treated with 125 mcg levothyroxine daily.  Following the discontinuation of mitotane, the dose of levothyroxine was gradually titrated down to 75 mcg daily, which the patient has been taking since May 2022.  Most recent TFTs from 3/24/2023 revealed a TSH " of 2.26 and a free T4 of 1.25.    Suzy was found to have a mildly elevated alkaline phosphatase of 145, September 2022.  The level remained stable at 148 on follow-up labs from March 2023.  The elevation appears to be due to the bone specific alkaline phosphatase.  Calcium, albumin, vitamin D and PTH levels have been normal, most recently checked in March 2023.  She continues to take multivitamins and calcium recommended for bariatric surgery.    Prior data prior to surgery:  5/14/18 normal plasma metanephrines  5/31/18 ALD 13.4, renin 0.8   5/28/18 urinary cortisol 351.6, 8 AM cortisol 23 (after dexamethasone), normal metanephrines     6/5/18 ACTH<10, DHEAS 740 (prior to surgery)    Suzy underwent bariatric surgery in 6/2016. Pre-surgery weight 235 lb, inga post-surgery 135 lbs. Her weight in 4/2017 was 139 lbs. her weight prior to adrenalectomy was 157 lbs.  Most recent weight was 148 pounds, in March this year.    Past Medical History   Past Medical History:   Diagnosis Date    Adrenal cortex cancer, right (H) 6/30/2018    Resected 6/25/18, Dr. Mansfield.    Adrenal mass (H)     Anemia     thought due to heavy menses.    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  Check in May. SCP started.  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) June 25, 2018   Cushing's syndrome, secondary to #1 (300 mcg/24 hr); Rx hydrocortisone 20 + 10 post op   Post operative defect right hemidiaphragm with ?worsening right effussion, not present preop   Currently on mitotane, 500 mg, BID x 1 week + hydrocortisone    Menstrual disorder  "    menorrhagia    MTHFR mutation     MTHFR mutation     believed to be homozygous for the mutation.    SHREYA on CPAP 2015    Stopped using CPAP the  after weight loss as even at lower pressure/adjustment couldn't tolerate the cpap. Resting well, cautioned to watch for any signs of fatigue and consider \"titration study\" in the future to see if cpap is still required at her new weight.    Pneumonia     hx of recurrent pneumonia issues/respiratory infections.    Polycystic ovary syndrome     able to get pregnant, not on meds.    Pulmonary embolism (H)  or so    briefly on wafarin.    Sleep apnea     cpap    Vitamin D deficiency    Diverticulosis  Obstructive sleep apnea which resolved following gastric bypass  GERD  Iron deficiency  Anemia  Ovarian cyst   IUD inserted on 19    Past Surgical History   Past Surgical History:   Procedure Laterality Date    ADRENALECTOMY Right 2018    Procedure: ADRENALECTOMY;  Right Adrenalectomy,  Embolize Liver , Anesthesia Block;  Surgeon: Warren Mansfield MD;  Location: [facility] OR    ADRENALECTOMY      ADRENALECTOMY Right      SECTION      twice     SECTION      2     SECTION      x2    EMBOLECTOMY ABDOMEN N/A 2018    Procedure: EMBOLECTOMY ABDOMEN;;  Surgeon: Ector Mcintyre MD;  Location: [facility] OR    ENDOSCOPIC ULTRASOUND UPPER GASTROINTESTINAL TRACT (GI) N/A 2023    Procedure: ENDOSCOPIC ULTRASOUND, ESOPHAGOSCOPY / UPPER GASTROINTESTINAL TRACT (GI), with cystgastrostomy stent placement and dilation;  Surgeon: Khoi Gomes MD;  Location: [facility] OR    ENDOSCOPIC ULTRASOUND UPPER GASTROINTESTINAL TRACT (GI) N/A 2023    Procedure: ENDOSCOPIC ULTRASOUND, UPPER GASTROINTESTINAL TRACT (GI), Esophagogastroduodenoscopy with biopsies;  Surgeon: Khoi Gomes MD;  Location: [facility] OR    ESOPHAGOSCOPY, GASTROSCOPY, DUODENOSCOPY (EGD), COMBINED N/A 2022    Procedure: ESOPHAGOGASTRODUODENOSCOPY,  NEEDLE  " WITH ENDOSCOPIC ULTRASOUND GUIDANCE;  Surgeon: Khoi Gomes MD;  Location: [facility] OR    EXTRACORPOREAL SHOCK WAVE LITHOTRIPSY (ESWL)      GASTRIC BYPASS  2016    UT CYSTO/URETERO W/LITHOTRIPSY &INDWELL STENT INSRT Left 5/11/2018    Procedure: CYSTOSCOPY, LEFT URETEROSCOPY LASER LITHOTRIPSY STENT INSERTION;  Surgeon: Skyler Delvalle MD;  Location: [facility] OR;  Service: Urology    UT LAP GASTRIC BYPASS/DERICK-EN-Y N/A 6/8/2016    Procedure: GASTRIC BYPASS LAPAROSCOPIC DERICK-EN-Y;  Surgeon: Randy Sutherland MD;  Location: [facility] OR;  Service: General    TUBAL LIGATION      WISDOM TOOTH EXTRACTION Bilateral        Current Medications    Current Outpatient Medications:     acetaminophen (TYLENOL) 325 MG tablet, Take 2 tablets (650 mg) by mouth every 4 hours as needed for other (multimodal surgical pain management along with NSAIDS and opioid medication as indicated based on pain control and physical function.), Disp: 150 tablet, Rfl: 0    buPROPion (WELLBUTRIN SR) 150 MG 12 hr tablet, Take 1 tablet (150 mg) by mouth 3 times daily, Disp: 270 tablet, Rfl: 1    clonazePAM (KLONOPIN) 0.5 MG tablet, Take 1 tablet (0.5 mg) by mouth 2 times daily as needed for anxiety, Disp: 60 tablet, Rfl: 0    FLUoxetine (PROZAC) 20 MG capsule, Take 3 capsules (60 mg) by mouth 3 times daily, Disp: 270 capsule, Rfl: 1    hydrocortisone (CORTEF) 5 MG tablet, 2 tablets with breakfast, 1 tablet at lunch and 1/2 tablet at supper, Disp: 60 tablet, Rfl: 1    levothyroxine (SYNTHROID/LEVOTHROID) 75 MCG tablet, Take 1 tablet (75 mcg) by mouth daily, Disp: 90 tablet, Rfl: 1    methylphenidate (RITALIN) 20 MG tablet, Take 20 mg by mouth 2 times daily, Disp: , Rfl:     temazepam (RESTORIL) 15 MG capsule, Take 1-2 capsules (15-30 mg) by mouth nightly as needed for sleep, Disp: 60 capsule, Rfl: 5  Iron supplement daily for 6 months     Family History   Problem Relation Age of Onset    Depression Paternal Grandmother     Diabetes  Mother     Diabetes Father     Hypertension Father     Chronic Obstructive Pulmonary Disease Father     Cancer Maternal Grandmother         gastric    Urolithiasis Maternal Grandmother     Heart Disease Maternal Grandfather     Cancer Maternal Grandfather         lung    Heart Disease Paternal Grandfather     Breast Cancer No family hx of     Colon Cancer No family hx of     Prostate Cancer No family hx of     Cerebrovascular Disease No family hx of     Clotting Disorder No family hx of     Gout No family hx of        Social History   with 2 children, 7 and 10-year-old. She denies smoking, drinking alcohol or using illicit drugs. Occupation: disabled.     Review of Systems   10 point review of systems negative unless specified.  Sometimes has a hard time getting a full breath - clonazepam helps   No diarrhea or constipation   No joint or bone pain   No tremor   Weight down a few lbs since 11/22 - patient thinks it's related to Ritalin.              Vital Signs     Previous Weights:    Wt Readings from Last 10 Encounters:   03/21/23 67.3 kg (148 lb 6.4 oz)   02/08/23 68.7 kg (151 lb 7.3 oz)   01/18/23 69 kg (152 lb 1.9 oz)   01/09/23 71.4 kg (157 lb 5 oz)   12/13/22 71.1 kg (156 lb 12 oz)   11/21/22 71.7 kg (158 lb)   05/05/22 70.3 kg (155 lb)   02/01/22 71 kg (156 lb 8 oz)   01/18/22 70.5 kg (155 lb 8 oz)   10/29/21 71.1 kg (156 lb 11.2 oz)        There were no vitals taken for this visit.    Physical Exam  General Appearance: alert, no distress noted   Eyes: grossly normal to inspection, conjunctivae and sclerae normal, no lid lag or stare   Respiratory: no audible wheeze, cough, or visible cyanosis.  No visible retractions or increased work of breathing.  Able to speak fully in complete sentences.  Neurological: Cranial nerves grossly intact, mentation intact and speech normal; no tremor of the outstretched hands   Skin: no lesions on exposed skin   Psychological: mentation appears normal, affect normal,  judgement and insight intact, normal speech and appearance well-groomed    Lab Results  I reviewed prior lab results documented in Epic.  TSH   Date Value Ref Range Status   03/24/2023 2.26 0.30 - 4.20 uIU/mL Final   01/09/2023 1.98 0.30 - 4.20 uIU/mL Final   10/25/2022 3.14 0.30 - 4.20 uIU/mL Final   09/29/2022 1.82 0.30 - 4.20 uIU/mL Final   05/12/2022 0.16 (L) 0.40 - 4.00 mU/L Final   03/24/2022 0.08 (L) 0.40 - 4.00 mU/L Final   02/08/2022 0.29 (L) 0.40 - 4.00 mU/L Final   01/18/2022 0.72 0.40 - 4.00 mU/L Final   12/31/2021 0.27 (L) 0.40 - 4.00 mU/L Final   07/05/2021 0.22 (L) 0.40 - 4.00 mU/L Final   06/04/2021 0.44 0.40 - 4.00 mU/L Final   05/10/2021 0.18 (L) 0.40 - 4.00 mU/L Final   04/13/2021 0.39 (L) 0.40 - 4.00 mU/L Final   03/17/2021 0.18 (L) 0.40 - 4.00 mU/L Final     T4 Free   Date Value Ref Range Status   07/05/2021 0.91 0.76 - 1.46 ng/dL Final   06/04/2021 0.93 0.76 - 1.46 ng/dL Final   05/10/2021 0.84 0.76 - 1.46 ng/dL Final   04/13/2021 0.76 0.76 - 1.46 ng/dL Final   03/17/2021 0.82 0.76 - 1.46 ng/dL Final     Free T4   Date Value Ref Range Status   03/24/2023 1.25 0.90 - 1.70 ng/dL Final   01/09/2023 1.23 0.90 - 1.70 ng/dL Final   10/25/2022 0.94 0.90 - 1.70 ng/dL Final   09/29/2022 1.02 0.90 - 1.70 ng/dL Final   05/12/2022 1.05 0.76 - 1.46 ng/dL Final       Assessment     1. 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 Ki-67 revealed an increased proliferation index of approximately 20%.   The tumor was functional and presented with high cortisol and DHEAS levels.  Post surgery/radiation and subtherapeutic treatment with mitotane (from July 2018 to May 2021). The DHEAS has remained undetectable.  Hydrocortisone was discontinued in October 2022.  Recent plasma and 24-hour urinary cortisol have been normal.  The patient has been experiencing a mild elevation in the bone specific alkaline phosphatase since  September last year, stable.  The etiology is unclear.  I am going to contact Dr. Hernadez to determine whether or not this should be further evaluated with more sensitive imaging test.    2.  Primary adrenal insufficiency, mitotane induced   Resolved as above    3. Hypothyroidism   Clinically and biochemically, she is euthyroid.  I recommended to continue to take the same dose of levothyroxine and have the thyroid hormone levels rechecked in 6 months, together with the antithyroid anibodies.    Orders Placed This Encounter   Procedures    TSH    T4 free    Bone specific alk phosphatase    Hepatic panel    Thyroid peroxidase antibody    Anti thyroglobulin antibody     Answers for HPI/ROS submitted by the patient on 9/26/2022  If you checked off any problems, how difficult have these problems made it for you to do your work, take care of things at home, or get along with other people?: Very difficult  PHQ9 TOTAL SCORE: 14    40 minutes spent on the date of the encounter doing chart review, history and exam, documentation and further activities as noted above.    Answers for HPI/ROS submitted by the patient on 4/6/2023  General Symptoms: Yes  Skin Symptoms: No  HENT Symptoms: Yes  EYE SYMPTOMS: Yes  HEART SYMPTOMS: Yes  LUNG SYMPTOMS: No  INTESTINAL SYMPTOMS: Yes  URINARY SYMPTOMS: No  GYNECOLOGIC SYMPTOMS: No  BREAST SYMPTOMS: No  SKELETAL SYMPTOMS: No  BLOOD SYMPTOMS: No  NERVOUS SYSTEM SYMPTOMS: No  MENTAL HEALTH SYMPTOMS: No  Ear pain: No  Ear discharge: No  Hearing loss: No  Tinnitus: No  Nosebleeds: No  Congestion: Yes  Sinus pain: No  Trouble swallowing: No   Voice hoarseness: Yes  Mouth sores: No  Sore throat: Yes  Tooth pain: No  Gum tenderness: No  Bleeding gums: No  Change in taste: No  Change in sense of smell: No  Dry mouth: No  Hearing aid used: No  Neck lump: No  Fever: Yes  Loss of appetite: Yes  Weight loss: Yes  Fatigue: No  Night sweats: No  Chills: No  Increased stress: Yes  Excessive hunger:  No  Excessive thirst: No  Feeling hot or cold when others believe the temperature is normal: No  Loss of height: No  Post-operative complications: No  Surgical site pain: No  Hallucinations: No  Change in or Loss of Energy: No  Hyperactivity: No  Confusion: No  Eye pain: No  Vision loss: No  Dry eyes: No  Watery eyes: No  Eye bulging: No  Double vision: No  Flashing of lights: No  Spots: No  Floaters: No  Redness: No  Crossed eyes: No  Tunnel Vision: No  Yellowing of eyes: No  Eye irritation: No  Chest pain or pressure: No  Fast or irregular heartbeat: Yes  Pain in legs with walking: No  Trouble breathing while lying down: No  Fingers or toes appear blue: No  High blood pressure: No  Low blood pressure: Yes  Fainting: No  Murmurs: No  Pacemaker: No  Varicose veins: No  Edema or swelling: No  Wake up at night with shortness of breath: No  Light-headedness: Yes  Exercise intolerance: No  Heart burn or indigestion: Yes  Nausea: Yes  Vomiting: No  Abdominal pain: No  Bloating: No  Constipation: Yes  Diarrhea: No  Blood in stool: No  Black stools: No  Rectal or Anal pain: No  Fecal incontinence: No  Yellowing of skin or eyes: No  Vomit with blood: No  Change in stools: No      Sincerely,    Veena Jaquez MD

## 2023-04-06 NOTE — NURSING NOTE
Is the patient currently in the state of [place]? YES    Visit mode:VIDEO    If the visit is dropped, the patient can be reconnected by: VIDEO VISIT: Text to cell phone: [phone]    Will anyone else be joining the visit? NO      How would you like to obtain your AVS? MyChart    Are changes needed to the allergy or medication list? NO    Reason for visit: Having symptoms  No refills needed

## 2023-04-06 NOTE — PROGRESS NOTES
"Video-Visit Details    Type of service:  Video Visit    Video Start Time (time video started): 10:03 am   Video End Time (time video stopped): 10:20 am     Originating Location (pt. Location): Home  Distant Location (provider location): Off-site    Mode of Communication:  Video Conference via [facility]    The patient is seen in f/up for adrenocortical cancer.     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  The adrenal mass was first identified in May 2018, on an abdominal CT done for evaluation of kidney stones.  The mass measured 9.2 x 8 cm on the noncontrast CT from 5/8/18, and was inseparable from the liver and upper pole of the right kidney.  The chest CT from 6/14/18 identified 2 indeterminate solid pulmonary nodules, with the largest measuring 3 mm in the subpleural posterior left lower lobe.  The patient underwent right adrenalectomy on 6/25/18.  Surgery was complicated by diaphragmatic injury during hepatic mobilization and small fracture of the right liver lobe, managed conservatively.  The pathology report (re read at [facility]) revealed the following:  \"Adrenal cortical carcinoma, low grade, oncocytic type, forming a mass 11.3 cm and weighing 412 grams (per report). Surgical resection margins are positive for tumor (per report). Lymphovascular invasion is focally present. The tumor seems to be confined to the adrenal gland.  AJCC Stage (with available surgical materials): pT2NX (8th edition, 2017). Immunoperoxidase stains were performed on paraffin sections at the referring institution and reviewed at [facility] in [place], [place].  Tumor cells are positive for calretinin, synaptophysin and Melan A, supporting the above diagnosis. Ki-67 reveal an increased proliferation index of approximately 20% in the most active areas.\"    Treatment " with mitotane was started in July 2018 and she completed radiation to the adrenal bed on October 8, 2018.  Titrating up the dose of mitotane was difficult. It was discontinued by Dr. Liang from [facility], in May 2021. The Mitotane level from 5/12/22 was undetectable.  Hydrocortisone was tapered down and discontinued in October 2022.  24-hour urinary cortisol from November 2022 was within normal limits.  DHEA-S remains undetectable on lab work from November 2022.    A couple of weeks ago, the patient reported experiencing episodes of lightheadedness, occurring mainly in the morning, randomly, and relieved by sitting down for 20 minutes or so.  Dr. Mandujano checked the morning cortisol and it was normal at 20.  She reports not having breakfast and having a few cups of coffee in the morning at that time.  Adderall was switched to Ritalin, which has affected her appetite.  Approximately 2 months ago she changed her diet to a vegetarian diet, but recently, she has been reintroducing meat.  Since having breakfast in the morning and significantly decreasing the caffeine intake, her symptoms have resolved.    Most recent chest abdomen and pelvic CT from October 2022 was remarkable for a duodenal mass, which proved to be a duodenal polyp, removed in February 2023.    As a result of treatment with mitotane, she developed secondary hypothyroidism, which was treated with 125 mcg levothyroxine daily.  Following the discontinuation of mitotane, the dose of levothyroxine was gradually titrated down to 75 mcg daily, which the patient has been taking since May 2022.  Most recent TFTs from 3/24/2023 revealed a TSH of 2.26 and a free T4 of 1.25.    Suzy was found to have a mildly elevated alkaline phosphatase of 145, September 2022.  The level remained stable at 148 on follow-up labs from March 2023.  The elevation appears to be due to the bone specific alkaline phosphatase.  Calcium, albumin, vitamin D and PTH levels  have been normal, most recently checked in March 2023.  She continues to take multivitamins and calcium recommended for bariatric surgery.    Prior data prior to surgery:  5/14/18 normal plasma metanephrines  5/31/18 ALD 13.4, renin 0.8   5/28/18 urinary cortisol 351.6, 8 AM cortisol 23 (after dexamethasone), normal metanephrines     6/5/18 ACTH<10, DHEAS 740 (prior to surgery)    Suzy underwent bariatric surgery in 6/2016. Pre-surgery weight 235 lb, inga post-surgery 135 lbs. Her weight in 4/2017 was 139 lbs. her weight prior to adrenalectomy was 157 lbs.  Most recent weight was 148 pounds, in March this year.    Past Medical History   Past Medical History:   Diagnosis Date     Adrenal cortex cancer, right (H) 6/30/2018    Resected 6/25/18, Dr. Mansfield.     Adrenal mass (H)      Anemia     thought due to heavy menses.     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  Check in May. SCP started.  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     SHREYA on CPAP 12/17/2015    Stopped using CPAP the Fall of 2016 after weight loss as even at lower pressure/adjustment couldn't tolerate the cpap. Resting well, cautioned to watch for any  "signs of fatigue and consider \"titration study\" in the future to see if cpap is still required at her new weight.     Pneumonia     hx of recurrent pneumonia issues/respiratory infections.     Polycystic ovary syndrome     able to get pregnant, not on meds.     Pulmonary embolism (H)  or so    briefly on wafarin.     Sleep apnea     cpap     Vitamin D deficiency    Diverticulosis  Obstructive sleep apnea which resolved following gastric bypass  GERD  Iron deficiency  Anemia  Ovarian cyst   IUD inserted on 19    Past Surgical History   Past Surgical History:   Procedure Laterality Date     ADRENALECTOMY Right 2018    Procedure: ADRENALECTOMY;  Right Adrenalectomy,  Embolize Liver , Anesthesia Block;  Surgeon: Warren Mansfield MD;  Location: [facility] OR     ADRENALECTOMY       ADRENALECTOMY Right       SECTION      twice      SECTION      2      SECTION      x2     EMBOLECTOMY ABDOMEN N/A 2018    Procedure: EMBOLECTOMY ABDOMEN;;  Surgeon: Ector Mcintyre MD;  Location: [facility] OR     ENDOSCOPIC ULTRASOUND UPPER GASTROINTESTINAL TRACT (GI) N/A 2023    Procedure: ENDOSCOPIC ULTRASOUND, ESOPHAGOSCOPY / UPPER GASTROINTESTINAL TRACT (GI), with cystgastrostomy stent placement and dilation;  Surgeon: Khoi Gomes MD;  Location: [facility] OR     ENDOSCOPIC ULTRASOUND UPPER GASTROINTESTINAL TRACT (GI) N/A 2023    Procedure: ENDOSCOPIC ULTRASOUND, UPPER GASTROINTESTINAL TRACT (GI), Esophagogastroduodenoscopy with biopsies;  Surgeon: Khoi Gomes MD;  Location: [facility] OR     ESOPHAGOSCOPY, GASTROSCOPY, DUODENOSCOPY (EGD), COMBINED N/A 2022    Procedure: ESOPHAGOGASTRODUODENOSCOPY,  NEEDLE  WITH ENDOSCOPIC ULTRASOUND GUIDANCE;  Surgeon: Khoi Gomes MD;  Location: [facility] OR     EXTRACORPOREAL SHOCK WAVE LITHOTRIPSY (ESWL)       GASTRIC BYPASS       CO CYSTO/URETERO W/LITHOTRIPSY &INDWELL STENT INSRT Left 2018    Procedure: CYSTOSCOPY, LEFT " URETEROSCOPY LASER LITHOTRIPSY STENT INSERTION;  Surgeon: Skyler Delvalle MD;  Location: [facility];  Service: Urology     SD LAP GASTRIC BYPASS/DERICK-EN-Y N/A 6/8/2016    Procedure: GASTRIC BYPASS LAPAROSCOPIC DERICK-EN-Y;  Surgeon: Randy Sutherland MD;  Location: [facility];  Service: General     TUBAL LIGATION       WISDOM TOOTH EXTRACTION Bilateral        Current Medications    Current Outpatient Medications:      acetaminophen (TYLENOL) 325 MG tablet, Take 2 tablets (650 mg) by mouth every 4 hours as needed for other (multimodal surgical pain management along with NSAIDS and opioid medication as indicated based on pain control and physical function.), Disp: 150 tablet, Rfl: 0     buPROPion (WELLBUTRIN SR) 150 MG 12 hr tablet, Take 1 tablet (150 mg) by mouth 3 times daily, Disp: 270 tablet, Rfl: 1     clonazePAM (KLONOPIN) 0.5 MG tablet, Take 1 tablet (0.5 mg) by mouth 2 times daily as needed for anxiety, Disp: 60 tablet, Rfl: 0     FLUoxetine (PROZAC) 20 MG capsule, Take 3 capsules (60 mg) by mouth 3 times daily, Disp: 270 capsule, Rfl: 1     hydrocortisone (CORTEF) 5 MG tablet, 2 tablets with breakfast, 1 tablet at lunch and 1/2 tablet at supper, Disp: 60 tablet, Rfl: 1     levothyroxine (SYNTHROID/LEVOTHROID) 75 MCG tablet, Take 1 tablet (75 mcg) by mouth daily, Disp: 90 tablet, Rfl: 1     methylphenidate (RITALIN) 20 MG tablet, Take 20 mg by mouth 2 times daily, Disp: , Rfl:      temazepam (RESTORIL) 15 MG capsule, Take 1-2 capsules (15-30 mg) by mouth nightly as needed for sleep, Disp: 60 capsule, Rfl: 5  Iron supplement daily for 6 months     Family History   Problem Relation Age of Onset     Depression Paternal Grandmother      Diabetes Mother      Diabetes Father      Hypertension Father      Chronic Obstructive Pulmonary Disease Father      Cancer Maternal Grandmother         gastric     Urolithiasis Maternal Grandmother      Heart Disease Maternal Grandfather      Cancer Maternal  Grandfather         lung     Heart Disease Paternal Grandfather      Breast Cancer No family hx of      Colon Cancer No family hx of      Prostate Cancer No family hx of      Cerebrovascular Disease No family hx of      Clotting Disorder No family hx of      Gout No family hx of        Social History   with 2 children, 7 and 10-year-old. She denies smoking, drinking alcohol or using illicit drugs. Occupation: disabled.     Review of Systems   10 point review of systems negative unless specified.  Sometimes has a hard time getting a full breath - clonazepam helps   No diarrhea or constipation   No joint or bone pain   No tremor   Weight down a few lbs since 11/22 - patient thinks it's related to Ritalin.              Vital Signs     Previous Weights:    Wt Readings from Last 10 Encounters:   03/21/23 67.3 kg (148 lb 6.4 oz)   02/08/23 68.7 kg (151 lb 7.3 oz)   01/18/23 69 kg (152 lb 1.9 oz)   01/09/23 71.4 kg (157 lb 5 oz)   12/13/22 71.1 kg (156 lb 12 oz)   11/21/22 71.7 kg (158 lb)   05/05/22 70.3 kg (155 lb)   02/01/22 71 kg (156 lb 8 oz)   01/18/22 70.5 kg (155 lb 8 oz)   10/29/21 71.1 kg (156 lb 11.2 oz)        There were no vitals taken for this visit.    Physical Exam  General Appearance: alert, no distress noted   Eyes: grossly normal to inspection, conjunctivae and sclerae normal, no lid lag or stare   Respiratory: no audible wheeze, cough, or visible cyanosis.  No visible retractions or increased work of breathing.  Able to speak fully in complete sentences.  Neurological: Cranial nerves grossly intact, mentation intact and speech normal; no tremor of the outstretched hands   Skin: no lesions on exposed skin   Psychological: mentation appears normal, affect normal, judgement and insight intact, normal speech and appearance well-groomed    Lab Results  I reviewed prior lab results documented in Epic.  TSH   Date Value Ref Range Status   03/24/2023 2.26 0.30 - 4.20 uIU/mL Final   01/09/2023 1.98 0.30 -  4.20 uIU/mL Final   10/25/2022 3.14 0.30 - 4.20 uIU/mL Final   09/29/2022 1.82 0.30 - 4.20 uIU/mL Final   05/12/2022 0.16 (L) 0.40 - 4.00 mU/L Final   03/24/2022 0.08 (L) 0.40 - 4.00 mU/L Final   02/08/2022 0.29 (L) 0.40 - 4.00 mU/L Final   01/18/2022 0.72 0.40 - 4.00 mU/L Final   12/31/2021 0.27 (L) 0.40 - 4.00 mU/L Final   07/05/2021 0.22 (L) 0.40 - 4.00 mU/L Final   06/04/2021 0.44 0.40 - 4.00 mU/L Final   05/10/2021 0.18 (L) 0.40 - 4.00 mU/L Final   04/13/2021 0.39 (L) 0.40 - 4.00 mU/L Final   03/17/2021 0.18 (L) 0.40 - 4.00 mU/L Final     T4 Free   Date Value Ref Range Status   07/05/2021 0.91 0.76 - 1.46 ng/dL Final   06/04/2021 0.93 0.76 - 1.46 ng/dL Final   05/10/2021 0.84 0.76 - 1.46 ng/dL Final   04/13/2021 0.76 0.76 - 1.46 ng/dL Final   03/17/2021 0.82 0.76 - 1.46 ng/dL Final     Free T4   Date Value Ref Range Status   03/24/2023 1.25 0.90 - 1.70 ng/dL Final   01/09/2023 1.23 0.90 - 1.70 ng/dL Final   10/25/2022 0.94 0.90 - 1.70 ng/dL Final   09/29/2022 1.02 0.90 - 1.70 ng/dL Final   05/12/2022 1.05 0.76 - 1.46 ng/dL Final       Assessment     1. 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 Ki-67 revealed an increased proliferation index of approximately 20%.   The tumor was functional and presented with high cortisol and DHEAS levels.  Post surgery/radiation and subtherapeutic treatment with mitotane (from July 2018 to May 2021). The DHEAS has remained undetectable.  Hydrocortisone was discontinued in October 2022.  Recent plasma and 24-hour urinary cortisol have been normal.  The patient has been experiencing a mild elevation in the bone specific alkaline phosphatase since September last year, stable.  The etiology is unclear.  I am going to contact Dr. Hernadez to determine whether or not this should be further evaluated with more sensitive imaging test.    2.  Primary adrenal insufficiency, mitotane induced    Resolved as above    3. Hypothyroidism   Clinically and biochemically, she is euthyroid.  I recommended to continue to take the same dose of levothyroxine and have the thyroid hormone levels rechecked in 6 months, together with the antithyroid anibodies.    Orders Placed This Encounter   Procedures     TSH     T4 free     Bone specific alk phosphatase     Hepatic panel     Thyroid peroxidase antibody     Anti thyroglobulin antibody     Answers for HPI/ROS submitted by the patient on 9/26/2022  If you checked off any problems, how difficult have these problems made it for you to do your work, take care of things at home, or get along with other people?: Very difficult  PHQ9 TOTAL SCORE: 14    40 minutes spent on the date of the encounter doing chart review, history and exam, documentation and further activities as noted above.    Answers for HPI/ROS submitted by the patient on 4/6/2023  General Symptoms: Yes  Skin Symptoms: No  HENT Symptoms: Yes  EYE SYMPTOMS: Yes  HEART SYMPTOMS: Yes  LUNG SYMPTOMS: No  INTESTINAL SYMPTOMS: Yes  URINARY SYMPTOMS: No  GYNECOLOGIC SYMPTOMS: No  BREAST SYMPTOMS: No  SKELETAL SYMPTOMS: No  BLOOD SYMPTOMS: No  NERVOUS SYSTEM SYMPTOMS: No  MENTAL HEALTH SYMPTOMS: No  Ear pain: No  Ear discharge: No  Hearing loss: No  Tinnitus: No  Nosebleeds: No  Congestion: Yes  Sinus pain: No  Trouble swallowing: No   Voice hoarseness: Yes  Mouth sores: No  Sore throat: Yes  Tooth pain: No  Gum tenderness: No  Bleeding gums: No  Change in taste: No  Change in sense of smell: No  Dry mouth: No  Hearing aid used: No  Neck lump: No  Fever: Yes  Loss of appetite: Yes  Weight loss: Yes  Fatigue: No  Night sweats: No  Chills: No  Increased stress: Yes  Excessive hunger: No  Excessive thirst: No  Feeling hot or cold when others believe the temperature is normal: No  Loss of height: No  Post-operative complications: No  Surgical site pain: No  Hallucinations: No  Change in or Loss of Energy:  No  Hyperactivity: No  Confusion: No  Eye pain: No  Vision loss: No  Dry eyes: No  Watery eyes: No  Eye bulging: No  Double vision: No  Flashing of lights: No  Spots: No  Floaters: No  Redness: No  Crossed eyes: No  Tunnel Vision: No  Yellowing of eyes: No  Eye irritation: No  Chest pain or pressure: No  Fast or irregular heartbeat: Yes  Pain in legs with walking: No  Trouble breathing while lying down: No  Fingers or toes appear blue: No  High blood pressure: No  Low blood pressure: Yes  Fainting: No  Murmurs: No  Pacemaker: No  Varicose veins: No  Edema or swelling: No  Wake up at night with shortness of breath: No  Light-headedness: Yes  Exercise intolerance: No  Heart burn or indigestion: Yes  Nausea: Yes  Vomiting: No  Abdominal pain: No  Bloating: No  Constipation: Yes  Diarrhea: No  Blood in stool: No  Black stools: No  Rectal or Anal pain: No  Fecal incontinence: No  Yellowing of skin or eyes: No  Vomit with blood: No  Change in stools: No

## 2023-04-19 ENCOUNTER — ANESTHESIA EVENT (OUTPATIENT)
Dept: SURGERY | Facility: CLINIC | Age: 38
End: 2023-04-19
Payer: COMMERCIAL

## 2023-04-19 ENCOUNTER — HOSPITAL ENCOUNTER (OUTPATIENT)
Facility: CLINIC | Age: 38
Discharge: HOME | End: 2023-04-19
Attending: INTERNAL MEDICINE | Admitting: INTERNAL MEDICINE
Payer: COMMERCIAL

## 2023-04-19 ENCOUNTER — ANESTHESIA (OUTPATIENT)
Dept: SURGERY | Facility: CLINIC | Age: 38
End: 2023-04-19
Payer: COMMERCIAL

## 2023-04-19 ENCOUNTER — APPOINTMENT (OUTPATIENT)
Dept: GENERAL RADIOLOGY | Facility: CLINIC | Age: 38
End: 2023-04-19
Attending: INTERNAL MEDICINE
Payer: COMMERCIAL

## 2023-04-19 VITALS
OXYGEN SATURATION: 99 % | DIASTOLIC BLOOD PRESSURE: 72 MMHG | RESPIRATION RATE: 18 BRPM | TEMPERATURE: 98 F | SYSTOLIC BLOOD PRESSURE: 106 MMHG | BODY MASS INDEX: 24.84 KG/M2 | HEIGHT: 64 IN | HEART RATE: 86 BPM | WEIGHT: 145.5 LBS

## 2023-04-19 LAB — UPPER GI ENDOSCOPY: NORMAL

## 2023-04-19 PROCEDURE — 370N000017 HC ANESTHESIA TECHNICAL FEE, PER MIN: Performed by: INTERNAL MEDICINE

## 2023-04-19 PROCEDURE — 250N000009 HC RX 250: Performed by: ANESTHESIOLOGY

## 2023-04-19 PROCEDURE — 999N000141 HC STATISTIC PRE-PROCEDURE NURSING ASSESSMENT: Performed by: INTERNAL MEDICINE

## 2023-04-19 PROCEDURE — 250N000011 HC RX IP 250 OP 636: Performed by: INTERNAL MEDICINE

## 2023-04-19 PROCEDURE — 250N000009 HC RX 250: Performed by: INTERNAL MEDICINE

## 2023-04-19 PROCEDURE — 710N000012 HC RECOVERY PHASE 2, PER MINUTE: Performed by: INTERNAL MEDICINE

## 2023-04-19 PROCEDURE — 360N000082 HC SURGERY LEVEL 2 W/ FLUORO, PER MIN: Performed by: INTERNAL MEDICINE

## 2023-04-19 PROCEDURE — 250N000011 HC RX IP 250 OP 636: Performed by: ANESTHESIOLOGY

## 2023-04-19 PROCEDURE — 258N000003 HC RX IP 258 OP 636: Performed by: ANESTHESIOLOGY

## 2023-04-19 PROCEDURE — 272N000001 HC OR GENERAL SUPPLY STERILE: Performed by: INTERNAL MEDICINE

## 2023-04-19 PROCEDURE — 999N000179 XR SURGERY CARM FLUORO LESS THAN 5 MIN W STILLS: Mod: TC

## 2023-04-19 RX ORDER — LIDOCAINE HYDROCHLORIDE 20 MG/ML
INJECTION, SOLUTION INFILTRATION; PERINEURAL PRN
Status: DISCONTINUED | OUTPATIENT
Start: 2023-04-19 | End: 2023-04-19

## 2023-04-19 RX ORDER — OXYCODONE HYDROCHLORIDE 5 MG/1
5 TABLET ORAL
Status: CANCELLED | OUTPATIENT
Start: 2023-04-19

## 2023-04-19 RX ORDER — PROPOFOL 10 MG/ML
INJECTION, EMULSION INTRAVENOUS CONTINUOUS PRN
Status: DISCONTINUED | OUTPATIENT
Start: 2023-04-19 | End: 2023-04-19

## 2023-04-19 RX ORDER — HYDROMORPHONE HYDROCHLORIDE 1 MG/ML
0.4 INJECTION, SOLUTION INTRAMUSCULAR; INTRAVENOUS; SUBCUTANEOUS EVERY 5 MIN PRN
Status: CANCELLED | OUTPATIENT
Start: 2023-04-19

## 2023-04-19 RX ORDER — LIDOCAINE 40 MG/G
CREAM TOPICAL
Status: CANCELLED | OUTPATIENT
Start: 2023-04-19

## 2023-04-19 RX ORDER — PROPOFOL 10 MG/ML
INJECTION, EMULSION INTRAVENOUS PRN
Status: DISCONTINUED | OUTPATIENT
Start: 2023-04-19 | End: 2023-04-19

## 2023-04-19 RX ORDER — OXYCODONE HYDROCHLORIDE 10 MG/1
10 TABLET ORAL
Status: CANCELLED | OUTPATIENT
Start: 2023-04-19

## 2023-04-19 RX ORDER — HYDROMORPHONE HYDROCHLORIDE 1 MG/ML
0.2 INJECTION, SOLUTION INTRAMUSCULAR; INTRAVENOUS; SUBCUTANEOUS EVERY 5 MIN PRN
Status: CANCELLED | OUTPATIENT
Start: 2023-04-19

## 2023-04-19 RX ORDER — SODIUM CHLORIDE, SODIUM LACTATE, POTASSIUM CHLORIDE, CALCIUM CHLORIDE 600; 310; 30; 20 MG/100ML; MG/100ML; MG/100ML; MG/100ML
INJECTION, SOLUTION INTRAVENOUS CONTINUOUS
Status: CANCELLED | OUTPATIENT
Start: 2023-04-19

## 2023-04-19 RX ORDER — ONDANSETRON 2 MG/ML
4 INJECTION INTRAMUSCULAR; INTRAVENOUS
Status: COMPLETED | OUTPATIENT
Start: 2023-04-19 | End: 2023-04-19

## 2023-04-19 RX ORDER — LIDOCAINE 40 MG/G
CREAM TOPICAL
Status: DISCONTINUED | OUTPATIENT
Start: 2023-04-19 | End: 2023-04-19 | Stop reason: HOSPADM

## 2023-04-19 RX ORDER — FENTANYL CITRATE 50 UG/ML
50 INJECTION, SOLUTION INTRAMUSCULAR; INTRAVENOUS EVERY 5 MIN PRN
Status: CANCELLED | OUTPATIENT
Start: 2023-04-19

## 2023-04-19 RX ORDER — SODIUM CHLORIDE, SODIUM LACTATE, POTASSIUM CHLORIDE, CALCIUM CHLORIDE 600; 310; 30; 20 MG/100ML; MG/100ML; MG/100ML; MG/100ML
INJECTION, SOLUTION INTRAVENOUS CONTINUOUS PRN
Status: DISCONTINUED | OUTPATIENT
Start: 2023-04-19 | End: 2023-04-19

## 2023-04-19 RX ORDER — ONDANSETRON 2 MG/ML
4 INJECTION INTRAMUSCULAR; INTRAVENOUS EVERY 30 MIN PRN
Status: CANCELLED | OUTPATIENT
Start: 2023-04-19

## 2023-04-19 RX ORDER — ONDANSETRON 4 MG/1
4 TABLET, ORALLY DISINTEGRATING ORAL EVERY 30 MIN PRN
Status: CANCELLED | OUTPATIENT
Start: 2023-04-19

## 2023-04-19 RX ORDER — FENTANYL CITRATE 50 UG/ML
25 INJECTION, SOLUTION INTRAMUSCULAR; INTRAVENOUS EVERY 5 MIN PRN
Status: CANCELLED | OUTPATIENT
Start: 2023-04-19

## 2023-04-19 RX ORDER — ONDANSETRON 2 MG/ML
INJECTION INTRAMUSCULAR; INTRAVENOUS PRN
Status: DISCONTINUED | OUTPATIENT
Start: 2023-04-19 | End: 2023-04-19

## 2023-04-19 RX ORDER — SCOLOPAMINE TRANSDERMAL SYSTEM 1 MG/1
1 PATCH, EXTENDED RELEASE TRANSDERMAL ONCE
Status: DISCONTINUED | OUTPATIENT
Start: 2023-04-19 | End: 2023-04-19 | Stop reason: HOSPADM

## 2023-04-19 RX ADMIN — ONDANSETRON 4 MG: 2 INJECTION INTRAMUSCULAR; INTRAVENOUS at 14:21

## 2023-04-19 RX ADMIN — SCOPALAMINE 1 PATCH: 1 PATCH, EXTENDED RELEASE TRANSDERMAL at 12:09

## 2023-04-19 RX ADMIN — SODIUM CHLORIDE, POTASSIUM CHLORIDE, SODIUM LACTATE AND CALCIUM CHLORIDE: 600; 310; 30; 20 INJECTION, SOLUTION INTRAVENOUS at 13:56

## 2023-04-19 RX ADMIN — ONDANSETRON 4 MG: 2 INJECTION INTRAMUSCULAR; INTRAVENOUS at 12:06

## 2023-04-19 RX ADMIN — PROPOFOL 30 MG: 10 INJECTION, EMULSION INTRAVENOUS at 14:18

## 2023-04-19 RX ADMIN — PROPOFOL 150 MCG/KG/MIN: 10 INJECTION, EMULSION INTRAVENOUS at 14:02

## 2023-04-19 RX ADMIN — PROPOFOL 50 MG: 10 INJECTION, EMULSION INTRAVENOUS at 14:20

## 2023-04-19 RX ADMIN — LIDOCAINE HYDROCHLORIDE 40 MG: 20 INJECTION, SOLUTION INFILTRATION; PERINEURAL at 14:02

## 2023-04-19 RX ADMIN — PROPOFOL 40 MG: 10 INJECTION, EMULSION INTRAVENOUS at 14:05

## 2023-04-19 RX ADMIN — TOPICAL ANESTHETIC 1 SPRAY: 200 SPRAY DENTAL; PERIODONTAL at 13:54

## 2023-04-19 ASSESSMENT — ACTIVITIES OF DAILY LIVING (ADL)
ADLS_ACUITY_SCORE: 35

## 2023-04-19 NOTE — OP NOTE
Upper GI Endoscopy 04/19/2023  1:50 PM [address] [place] [address] [phone]     Endoscopy Department   _______________________________________________________________________________   Patient Name: Suzy Rodríguez       Procedure Date: 4/19/2023 1:50 PM   MRN: [id number]                       Account Number: [id number]   YOB: 1985             Admit Type: Outpatient   Age: 37                               Room: [place] [address]   Gender: Female                        Note Status: Finalized   Attending MD: MIKE PATTEN MD  Total Sedation Time:   _______________________________________________________________________________       Procedure:             Upper GI endoscopy   Indications:           Foreign body in the GI tract   Providers:             MIKE PATTEN MD, DEBBI BAUTISTA   Patient Profile:       Ms Rodríguez is a 38yo woman with RYGB anatomy and                          history ofÂ adrenocortical carcinoma who had an                          incidental duodenalÂ lesion seen on imaging prompting                          endoscopic enteroenterostomy. This revealed an                          inflammatory polyp and she now returns for stent                          removal. She notes that her oral intake has decreased                          and does not wish to pursue revision of the pouch and                          anastomosis.   Referring MD:          DAIANA TATUM   Medicines:             Deep sedation was administered   Complications:         No immediate complications.   _______________________________________________________________________________   Procedure:             Pre-Anesthesia Assessment:                          - Prior to the procedure, a History and Physical was                          performed, and patient medications and allergies were                          reviewed. The  patient is competent. The risks and                          benefits of the procedure and the sedation options and                          risks were discussed with the patient. All questions                          were answered and informed consent was obtained.                          Patient identification and proposed procedure were                          verified by the nurse in the pre-procedure area.                          Mental Status Examination: alert and oriented. Airway                          Examination: Mallampati Class II (the uvula but not                          tonsillar pillars visualized). Respiratory                          Examination: clear to auscultation. CV Examination:                          normal. ASA Grade Assessment: III - A patient with                          severe systemic disease. After reviewing the risks and                          benefits, the patient was deemed in satisfactory                          condition to undergo the procedure. The anesthesia                          plan was to use deep sedation / analgesia. Immediately                          prior to administration of medications, the patient                          was re-assessed for adequacy to receive sedatives. The                          heart rate, respiratory rate, oxygen saturations,                          blood pressure, adequacy of pulmonary ventilation, and                          response to care were monitored throughout the                          procedure. The physical status of the patient was                          re-assessed after the procedure. After obtaining                          informed consent, the endoscope was passed under                          direct vision. Throughout the procedure, the patient's                          blood pressure, pulse, and oxygen saturations were                          monitored continuously. The 1T was introduced through                           the mouth, and advanced to the jejunum. The upper GI                          endoscopy was accomplished without difficulty. The                          patient tolerated the procedure well.                                                                                     Findings:        The patient was supine under deep sedation and a 1T was used throughout.         films demonstrated the LAMS in the appropriate location of the        left upper quadrant. The 1T was passed demonstrating an unremarkable        esophagus and Tanika en Y anatomy with a long pouch and widely patent        gastrojejunostomy without lesion. The proximal Tanika appeared healthy.        Adjacent to the gasrojejunostomy was a well seated LAMS communicating        with the remnant stomach. The stent was removed in toto without trauma.        Relook endoscopy revealed the mature gastrogastostomy without full        thickness defect. Completion films were without extraluminal gas.                                                                                     Impression:            - RYGB anatomy with long pouch and widely patent                          gastrojejunostomy without lesion                          - Uncomplicated removal of a well seated                          gastrogastrostomy stent revealing a mature tract which                          should close spontaneously   Recommendation:        - Deep sedation recovery with probable discharge home                          this morning                          - All medications, diet and activity may resume                          without delay                          - With progressive or continues poor appetite, or with                          undesired weight loss, contact our team and or Dr Hernadez's team                          - Follow up with your established providers                          - The findings and  recommendations were discussed with                          the patient and their family                                                                                       electronically signed by RIVAS Gomes

## 2023-04-19 NOTE — DISCHARGE INSTRUCTIONS
[facility]  Same-Day Surgery   Adult Discharge Orders & Instructions     For 24 hours after surgery    Get plenty of rest.  A responsible adult must stay with you for at least 24 hours after you leave the hospital.   Do not drive or use heavy equipment.  If you have weakness or tingling, don't drive or use heavy equipment until this feeling goes away.  Do not drink alcohol.  Avoid strenuous or risky activities.  Ask for help when climbing stairs.   You may feel lightheaded.  IF so, sit for a few minutes before standing.  Have someone help you get up.   If you have nausea (feel sick to your stomach): Drink only clear liquids such as apple juice, ginger ale, broth or 7-Up.  Rest may also help.  Be sure to drink enough fluids.  Move to a regular diet as you feel able.  You may have a slight fever. Call the doctor if your fever is over 100 F (37.7 C) (taken under the tongue) or lasts longer than 24 hours.  You may have a dry mouth, a sore throat, muscle aches or trouble sleeping.  These should go away after 24 hours.  Do not make important or legal decisions.   Call your doctor for any of the followin.  Signs of infection (fever, growing tenderness at the surgery site, a large amount of drainage or bleeding, severe pain, foul-smelling drainage, redness, swelling).    2. It has been over 8 to 10 hours since surgery and you are still not able to urinate (pass water).    3.  Headache for over 24 hours.    4.  Numbness, tingling or weakness the day after surgery (if you had spinal anesthesia).  To contact a doctor, call Mireya wang     [phone] [OFFICE  or:[phone] [CLINIC]    '   [phone] and ask for the resident on call for Gastroenterology   (answered 24 hours a day)  '   Emergency Department:    [facility]: [phone]       (TTY for hearing impaired: [phone])  [facility]: [phone]       (TTY for hearing impaired: [phone])

## 2023-04-19 NOTE — ANESTHESIA CARE TRANSFER NOTE
Patient: Suzy Rodríguez    Procedure: Procedure(s):  ESOPHAGOGASTRODUODENOSCOPY with stent removal       Diagnosis: History of gastric bypass [Z98.84]  Diagnosis Additional Information: No value filed.    Anesthesia Type:   General     Note:    Oropharynx: oropharynx clear of all foreign objects and spontaneously breathing  Level of Consciousness: awake  Oxygen Supplementation: room air    Independent Airway: airway patency satisfactory and stable  Dentition: dentition unchanged  Vital Signs Stable: post-procedure vital signs reviewed and stable  Report to RN Given: handoff report given  Patient transferred to: Phase II    Handoff Report: Identifed the Patient, Identified the Reponsible Provider, Reviewed the pertinent medical history, Discussed the surgical course, Reviewed Intra-OP anesthesia mangement and issues during anesthesia, Set expectations for post-procedure period and Allowed opportunity for questions and acknowledgement of understanding      Vitals:  Vitals Value Taken Time   /72    Temp     Pulse 92    Resp 14    SpO2 98        Electronically Signed By: MYRON Rangel CRNA  April 19, 2023  2:36 PM

## 2023-04-19 NOTE — OR NURSING
Patient was prepped in pre-op unit according to facility policy and MD orders. Patient's questions were answered and pt was able to verbalize an understanding of the information provided. Patient denies any outstanding questions and or concerns at this time.   Nursing will continue to support as indicated. Patient was released to OR staff by writer. '

## 2023-04-19 NOTE — ANESTHESIA PREPROCEDURE EVALUATION
"Anesthesia Pre-Procedure Evaluation    Patient: Suzy Rodríguez   MRN: [id number] : 1985        Procedure : Procedure(s):  ESOPHAGOGASTRODUODENOSCOPY          Past Medical History:   Diagnosis Date     Adrenal cortex cancer, right (H) 2018    Resected 18, Dr. Mansfield.     Adrenal mass (H)      Anemia     thought due to heavy menses.     Calculus of ureter 2018     Carcinoma, adrenal cortical (H) 2018     Chocolate cyst of ovary      Clotting disorder (H)      Depression      GERD (gastroesophageal reflux disease)      History of miscarriage      Hypertension due to endocrine disorder 2018     Kidney stones     passed on their own     Malignant neoplasm of cortex of right adrenal gland (H) 2018    EOTD; 19.  Check in May. SCP started.  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     SHREYA on CPAP 2015    Stopped using CPAP the  after weight loss as even at lower pressure/adjustment couldn't tolerate the cpap. Resting well, cautioned to watch for any signs of fatigue and consider \"titration study\" in the future to see if cpap is still required at her new weight.     Pneumonia     hx of recurrent pneumonia issues/respiratory infections.     Polycystic ovary syndrome     able to get pregnant, not on meds.     Pulmonary embolism (H)  or so    briefly on wafarin.     Sleep apnea     cpap     Vitamin D deficiency       Past Surgical History:   Procedure Laterality Date     ADRENALECTOMY Right 2018    Procedure: " ADRENALECTOMY;  Right Adrenalectomy,  Embolize Liver , Anesthesia Block;  Surgeon: Warren Mansfield MD;  Location: [facility] OR     ADRENALECTOMY       ADRENALECTOMY Right       SECTION      twice      SECTION      2      SECTION      x2     EMBOLECTOMY ABDOMEN N/A 2018    Procedure: EMBOLECTOMY ABDOMEN;;  Surgeon: Ector Mcintyre MD;  Location: [facility] OR     ENDOSCOPIC ULTRASOUND UPPER GASTROINTESTINAL TRACT (GI) N/A 2023    Procedure: ENDOSCOPIC ULTRASOUND, ESOPHAGOSCOPY / UPPER GASTROINTESTINAL TRACT (GI), with cystgastrostomy stent placement and dilation;  Surgeon: Khoi Gomes MD;  Location: [facility] OR     ENDOSCOPIC ULTRASOUND UPPER GASTROINTESTINAL TRACT (GI) N/A 2023    Procedure: ENDOSCOPIC ULTRASOUND, UPPER GASTROINTESTINAL TRACT (GI), Esophagogastroduodenoscopy with biopsies;  Surgeon: Khoi Gomes MD;  Location: [facility] OR     ESOPHAGOSCOPY, GASTROSCOPY, DUODENOSCOPY (EGD), COMBINED N/A 2022    Procedure: ESOPHAGOGASTRODUODENOSCOPY,  NEEDLE  WITH ENDOSCOPIC ULTRASOUND GUIDANCE;  Surgeon: Khoi Gomes MD;  Location: [facility] OR     EXTRACORPOREAL SHOCK WAVE LITHOTRIPSY (ESWL)       GASTRIC BYPASS       WA CYSTO/URETERO W/LITHOTRIPSY &INDWELL STENT INSRT Left 2018    Procedure: CYSTOSCOPY, LEFT URETEROSCOPY LASER LITHOTRIPSY STENT INSERTION;  Surgeon: Skyler Delvalle MD;  Location: [facility];  Service: Urology     WA LAP GASTRIC BYPASS/DERICK-EN-Y N/A 2016    Procedure: GASTRIC BYPASS LAPAROSCOPIC DERICK-EN-Y;  Surgeon: Randy Sutherland MD;  Location: [facility];  Service: General     TUBAL LIGATION       WISDOM TOOTH EXTRACTION Bilateral       Allergies   Allergen Reactions     Bee Venom Swelling     Ibuprofen Hives and Swelling      Social History     Tobacco Use     Smoking status: Former     Smokeless tobacco: Never     Tobacco comments:     vapes marijuana   Vaping Use     Vaping status: Never Used   Substance  Use Topics     Alcohol use: Yes     Comment: occ.      Wt Readings from Last 1 Encounters:   04/19/23 66 kg (145 lb 8.1 oz)        Anesthesia Evaluation   Pt has had prior anesthetic.     History of anesthetic complications  - PONV.      ROS/MED HX  ENT/Pulmonary:     (+) sleep apnea, recent URI,     Neurologic:       Cardiovascular:     (+) hypertension-----    METS/Exercise Tolerance:     Hematologic:       Musculoskeletal:       GI/Hepatic:     (+) GERD,     Renal/Genitourinary:     (+) renal disease,     Endo:     (+) thyroid problem,     Psychiatric/Substance Use:       Infectious Disease:       Malignancy:       Other:            Physical Exam    Airway        Mallampati: II    Neck ROM: full     Respiratory Devices and Support         Dental       (+) Minor Abnormalities - some fillings, tiny chips      Cardiovascular   cardiovascular exam normal          Pulmonary   pulmonary exam normal                OUTSIDE LABS:  CBC:   Lab Results   Component Value Date    WBC 7.2 03/24/2023    WBC 5.3 11/21/2022    HGB 13.2 03/24/2023    HGB 12.1 11/21/2022    HCT 42.0 03/24/2023    HCT 39.0 11/21/2022     03/24/2023     11/21/2022     BMP:   Lab Results   Component Value Date     03/24/2023     10/25/2022    POTASSIUM 4.1 03/24/2023    POTASSIUM 4.2 10/25/2022    CHLORIDE 105 03/24/2023    CHLORIDE 104 10/25/2022    CO2 24 03/24/2023    CO2 27 10/25/2022    BUN 12.0 03/24/2023    BUN 9.1 10/25/2022    CR 0.86 03/24/2023    CR 0.75 10/25/2022    GLC 96 03/24/2023    GLC 91 10/25/2022     COAGS:   Lab Results   Component Value Date    INR 0.89 06/29/2018     POC:   Lab Results   Component Value Date     (H) 06/25/2018    HCG Negative 11/21/2022    HCGS Negative 06/25/2018     HEPATIC:   Lab Results   Component Value Date    ALBUMIN 4.1 03/24/2023    PROTTOTAL 6.0 (L) 03/24/2023    ALT 23 03/24/2023    AST 22 03/24/2023    ALKPHOS 140 (H) 03/24/2023    BILITOTAL 0.2 03/24/2023     OTHER:    Lab Results   Component Value Date    PH 7.34 (L) 06/25/2018    LACT 2.2 (H) 06/29/2018    A1C 4.8 03/24/2023    SHASHA 9.3 03/24/2023    LIPASE 144 06/29/2018    TSH 2.26 03/24/2023    T4 1.25 03/24/2023    T3 100 05/12/2022       Anesthesia Plan    ASA Status:  2      Anesthesia Type: MAC.              Consents    Anesthesia Plan(s) and associated risks, benefits, and realistic alternatives discussed. Questions answered and patient/representative(s) expressed understanding.     - Discussed: Risks, Benefits and Alternatives for the PROCEDURE were discussed     - Discussed with:  Patient      - Extended Intubation/Ventilatory Support Discussed: No.      - Patient is DNR/DNI Status: No    Use of blood products discussed: No .     Postoperative Care    Pain management: Multi-modal analgesia.   PONV prophylaxis: Ondansetron (or other 5HT-3), Scopolamine patch, Dexamethasone or Solumedrol     Comments:                Lina Barrett MD

## 2023-04-25 NOTE — ANESTHESIA POSTPROCEDURE EVALUATION
Patient: Suzy Rodríguez    Procedure: Procedure(s):  ESOPHAGOGASTRODUODENOSCOPY with stent removal       Anesthesia Type:  General    Note:  Anesthesia Post Evaluation    Last vitals:  Vitals Value Taken Time   /72 04/19/23 1438   Temp 36.7  C (98  F) 04/19/23 1438   Pulse 86 04/19/23 1438   Resp 18 04/19/23 1438   SpO2 99 % 04/19/23 1438       Electronically Signed By: Lina Barrett MD  April 25, 2023  11:14 AM

## 2023-04-25 NOTE — ANESTHESIA POSTPROCEDURE EVALUATION
Patient: Suzy Rodríguez    Procedure: Procedure(s):  ESOPHAGOGASTRODUODENOSCOPY with stent removal       Anesthesia Type:  General    Note:  Disposition: Outpatient   Postop Pain Control: Uneventful            Sign Out: Well controlled pain   PONV: No   Neuro/Psych: Uneventful            Sign Out: Acceptable/Baseline neuro status   Airway/Respiratory: Uneventful            Sign Out: Acceptable/Baseline resp. status   CV/Hemodynamics: Uneventful            Sign Out: Acceptable CV status; No obvious hypovolemia; No obvious fluid overload   Other NRE: NONE   DID A NON-ROUTINE EVENT OCCUR? No           Last vitals:  Vitals Value Taken Time   /72 04/19/23 1438   Temp 36.7  C (98  F) 04/19/23 1438   Pulse 86 04/19/23 1438   Resp 18 04/19/23 1438   SpO2 99 % 04/19/23 1438       Electronically Signed By: Lina Barrett MD  April 25, 2023  11:13 AM

## 2023-04-26 DIAGNOSIS — C74.01 MALIGNANT NEOPLASM OF CORTEX OF RIGHT ADRENAL GLAND (H): Primary | ICD-10-CM

## 2023-04-26 DIAGNOSIS — R74.8 ELEVATED ALKALINE PHOSPHATASE LEVEL: ICD-10-CM

## 2023-05-01 ENCOUNTER — TELEPHONE (OUTPATIENT)
Dept: SLEEP MEDICINE | Facility: CLINIC | Age: 38
End: 2023-05-01
Payer: COMMERCIAL

## 2023-05-12 ENCOUNTER — HOSPITAL ENCOUNTER (OUTPATIENT)
Dept: PET IMAGING | Facility: CLINIC | Age: 38
Setting detail: NUCLEAR MEDICINE
Discharge: HOME | End: 2023-05-12
Attending: INTERNAL MEDICINE | Admitting: INTERNAL MEDICINE
Payer: COMMERCIAL

## 2023-05-12 ENCOUNTER — ANCILLARY ORDERS (OUTPATIENT)
Dept: ONCOLOGY | Facility: CLINIC | Age: 38
End: 2023-05-12

## 2023-05-12 DIAGNOSIS — R74.8 ELEVATED ALKALINE PHOSPHATASE LEVEL: ICD-10-CM

## 2023-05-12 DIAGNOSIS — C74.01 MALIGNANT NEOPLASM OF CORTEX OF RIGHT ADRENAL GLAND (H): ICD-10-CM

## 2023-05-12 PROCEDURE — 250N000011 HC RX IP 250 OP 636: Performed by: INTERNAL MEDICINE

## 2023-05-12 PROCEDURE — 74177 CT ABD & PELVIS W/CONTRAST: CPT

## 2023-05-12 PROCEDURE — A9552 F18 FDG: HCPCS | Performed by: INTERNAL MEDICINE

## 2023-05-12 PROCEDURE — 74177 CT ABD & PELVIS W/CONTRAST: CPT | Mod: 26 | Performed by: RADIOLOGY

## 2023-05-12 PROCEDURE — 71260 CT THORAX DX C+: CPT | Mod: 26 | Performed by: RADIOLOGY

## 2023-05-12 PROCEDURE — 78815 PET IMAGE W/CT SKULL-THIGH: CPT | Mod: MG,PS

## 2023-05-12 PROCEDURE — 78815 PET IMAGE W/CT SKULL-THIGH: CPT | Mod: 26 | Performed by: RADIOLOGY

## 2023-05-12 PROCEDURE — 343N000001 HC RX 343: Performed by: INTERNAL MEDICINE

## 2023-05-12 PROCEDURE — G1010 CDSM STANSON: HCPCS | Performed by: RADIOLOGY

## 2023-05-12 RX ORDER — IOPAMIDOL 755 MG/ML
0-135 INJECTION, SOLUTION INTRAVASCULAR ONCE
Status: COMPLETED | OUTPATIENT
Start: 2023-05-12 | End: 2023-05-12

## 2023-05-12 RX ADMIN — IOPAMIDOL 81 ML: 755 INJECTION, SOLUTION INTRAVENOUS at 07:29

## 2023-05-12 RX ADMIN — FLUDEOXYGLUCOSE F-18 9.8 MILLICURIE: 500 INJECTION, SOLUTION INTRAVENOUS at 06:37

## 2023-06-08 ENCOUNTER — LAB (OUTPATIENT)
Dept: LAB | Facility: CLINIC | Age: 38
End: 2023-06-08
Payer: COMMERCIAL

## 2023-06-08 DIAGNOSIS — R30.0 DIFFICULT OR PAINFUL URINATION: ICD-10-CM

## 2023-06-08 DIAGNOSIS — E03.8 SECONDARY HYPOTHYROIDISM: ICD-10-CM

## 2023-06-08 LAB
ALBUMIN UR-MCNC: NEGATIVE MG/DL
APPEARANCE UR: CLEAR
BACTERIA #/AREA URNS HPF: ABNORMAL /HPF
BILIRUB UR QL STRIP: NEGATIVE
COLOR UR AUTO: YELLOW
GLUCOSE UR STRIP-MCNC: NEGATIVE MG/DL
HGB UR QL STRIP: ABNORMAL
KETONES UR STRIP-MCNC: ABNORMAL MG/DL
LEUKOCYTE ESTERASE UR QL STRIP: NEGATIVE
NITRATE UR QL: NEGATIVE
PH UR STRIP: 6 [PH] (ref 5–7)
RBC #/AREA URNS AUTO: ABNORMAL /HPF
SP GR UR STRIP: >=1.03 (ref 1–1.03)
SQUAMOUS #/AREA URNS AUTO: ABNORMAL /LPF
UROBILINOGEN UR STRIP-ACNC: 0.2 E.U./DL
WBC #/AREA URNS AUTO: ABNORMAL /HPF

## 2023-06-08 PROCEDURE — 81001 URINALYSIS AUTO W/SCOPE: CPT

## 2023-06-09 RX ORDER — LEVOTHYROXINE SODIUM 75 UG/1
75 TABLET ORAL DAILY
Qty: 90 TABLET | Refills: 2 | Status: SHIPPED | OUTPATIENT
Start: 2023-06-09 | End: 2023-11-27

## 2023-06-09 NOTE — TELEPHONE ENCOUNTER
levothyroxine (SYNTHROID/LEVOTHROID) 75 MCG tablet      Virtual Visit    4/6/2023  [facility] Veena Corral MD  Endocrinology, Diabetes, and Metabolism

## 2023-07-19 ENCOUNTER — LAB (OUTPATIENT)
Dept: LAB | Facility: CLINIC | Age: 38
End: 2023-07-19
Payer: COMMERCIAL

## 2023-07-19 DIAGNOSIS — C74.01 MALIGNANT NEOPLASM OF CORTEX OF RIGHT ADRENAL GLAND (H): ICD-10-CM

## 2023-07-19 DIAGNOSIS — E27.40 ADRENAL INSUFFICIENCY (H): ICD-10-CM

## 2023-07-19 LAB
ALBUMIN SERPL BCG-MCNC: 4.2 G/DL (ref 3.5–5.2)
ALP SERPL-CCNC: 114 U/L (ref 35–104)
ALT SERPL W P-5'-P-CCNC: 19 U/L (ref 0–50)
ANION GAP SERPL CALCULATED.3IONS-SCNC: 9 MMOL/L (ref 7–15)
AST SERPL W P-5'-P-CCNC: 21 U/L (ref 0–45)
BASOPHILS # BLD AUTO: 0 10E3/UL (ref 0–0.2)
BASOPHILS NFR BLD AUTO: 1 %
BILIRUB SERPL-MCNC: 0.3 MG/DL
BUN SERPL-MCNC: 9.6 MG/DL (ref 6–20)
CALCIUM SERPL-MCNC: 9.7 MG/DL (ref 8.6–10)
CHLORIDE SERPL-SCNC: 104 MMOL/L (ref 98–107)
CREAT SERPL-MCNC: 0.85 MG/DL (ref 0.51–0.95)
DEPRECATED HCO3 PLAS-SCNC: 27 MMOL/L (ref 22–29)
EOSINOPHIL # BLD AUTO: 0.2 10E3/UL (ref 0–0.7)
EOSINOPHIL NFR BLD AUTO: 4 %
ERYTHROCYTE [DISTWIDTH] IN BLOOD BY AUTOMATED COUNT: 13 % (ref 10–15)
GFR SERPL CREATININE-BSD FRML MDRD: 89 ML/MIN/1.73M2
GLUCOSE SERPL-MCNC: 90 MG/DL (ref 70–99)
HCT VFR BLD AUTO: 42.5 % (ref 35–47)
HGB BLD-MCNC: 13.8 G/DL (ref 11.7–15.7)
IMM GRANULOCYTES # BLD: 0 10E3/UL
IMM GRANULOCYTES NFR BLD: 0 %
LYMPHOCYTES # BLD AUTO: 1.4 10E3/UL (ref 0.8–5.3)
LYMPHOCYTES NFR BLD AUTO: 31 %
MCH RBC QN AUTO: 29.9 PG (ref 26.5–33)
MCHC RBC AUTO-ENTMCNC: 32.5 G/DL (ref 31.5–36.5)
MCV RBC AUTO: 92 FL (ref 78–100)
MONOCYTES # BLD AUTO: 0.2 10E3/UL (ref 0–1.3)
MONOCYTES NFR BLD AUTO: 5 %
NEUTROPHILS # BLD AUTO: 2.6 10E3/UL (ref 1.6–8.3)
NEUTROPHILS NFR BLD AUTO: 60 %
PLATELET # BLD AUTO: 159 10E3/UL (ref 150–450)
POTASSIUM SERPL-SCNC: 4.2 MMOL/L (ref 3.4–5.3)
PROT SERPL-MCNC: 6.4 G/DL (ref 6.4–8.3)
RBC # BLD AUTO: 4.61 10E6/UL (ref 3.8–5.2)
SODIUM SERPL-SCNC: 140 MMOL/L (ref 136–145)
TSH SERPL DL<=0.005 MIU/L-ACNC: 1.95 UIU/ML (ref 0.3–4.2)
WBC # BLD AUTO: 4.4 10E3/UL (ref 4–11)

## 2023-07-19 PROCEDURE — 36415 COLL VENOUS BLD VENIPUNCTURE: CPT

## 2023-07-19 PROCEDURE — 82627 DEHYDROEPIANDROSTERONE: CPT

## 2023-07-19 PROCEDURE — 84443 ASSAY THYROID STIM HORMONE: CPT

## 2023-07-19 PROCEDURE — 80053 COMPREHEN METABOLIC PANEL: CPT

## 2023-07-19 PROCEDURE — 82024 ASSAY OF ACTH: CPT

## 2023-07-19 PROCEDURE — 85025 COMPLETE CBC W/AUTO DIFF WBC: CPT

## 2023-07-20 LAB
ACTH PLAS-MCNC: 39 PG/ML
DHEA-S SERPL-MCNC: <15 UG/DL (ref 35–430)

## 2023-07-27 ENCOUNTER — ALLIED HEALTH/NURSE VISIT (OUTPATIENT)
Dept: FAMILY MEDICINE | Facility: CLINIC | Age: 38
End: 2023-07-27
Payer: COMMERCIAL

## 2023-07-27 DIAGNOSIS — Z23 NEED FOR VARICELLA VACCINE: Primary | ICD-10-CM

## 2023-07-27 PROCEDURE — 90471 IMMUNIZATION ADMIN: CPT

## 2023-07-27 PROCEDURE — 90716 VAR VACCINE LIVE SUBQ: CPT

## 2023-07-27 PROCEDURE — 99207 PR NO CHARGE NURSE ONLY: CPT

## 2023-07-27 NOTE — PROGRESS NOTES
Prior to immunization administration, verified patients identity using patient s name and date of birth. Please see Immunization Activity for additional information.     Screening Questionnaire for Adult Immunization    Are you sick today?   No   Do you have allergies to medications, food, a vaccine component or latex?   No   Have you ever had a serious reaction after receiving a vaccination?   No   Do you have a long-term health problem with heart, lung, kidney, or metabolic disease (e.g., diabetes), asthma, a blood disorder, no spleen, complement component deficiency, a cochlear implant, or a spinal fluid leak?  Are you on long-term aspirin therapy?   No   Do you have cancer, leukemia, HIV/AIDS, or any other immune system problem?   No   Do you have a parent, brother, or sister with an immune system problem?   No   In the past 3 months, have you taken medications that affect  your immune system, such as prednisone, other steroids, or anticancer drugs; drugs for the treatment of rheumatoid arthritis, Crohn s disease, or psoriasis; or have you had radiation treatments?   No   Have you had a seizure, or a brain or other nervous system problem?   No   During the past year, have you received a transfusion of blood or blood    products, or been given immune (gamma) globulin or antiviral drug?   No   For women: Are you pregnant or is there a chance you could become       pregnant during the next month?   No   Have you received any vaccinations in the past 4 weeks?   No     Immunization questionnaire answers were all negative.    I have reviewed the following standing orders:   This patient is due and qualifies for the Varicella vaccine.    Click here for Varicella (Adult) Standing Order    I have reviewed the vaccines inclusion and exclusion criteria; No concerns regarding eligibility.     Patient instructed to remain in clinic for 15 minutes afterwards, and to report any adverse reactions.     Screening performed by  Olive Smith CMA on 7/27/2023 at 11:30 AM.

## 2023-08-07 NOTE — GROUP NOTE
On omeprazole daily for hoarse voice, sore throat and reflux sx  Now these have resolved  Pt feels she no longer needs the med  She was off it for a month, now back on for the last 2 wks  Recommend:  Take every other day for a month, then stop  If symptoms return infrequently (< weekly), use famotidine/ pepcid 20 mg as needed - OTC  If symptoms return frequently (1-2 x weekly), can use the famotidine, but let me know about it.   You can also talk to GI about this medication.    Psychotherapy Group Note    PATIENT'S NAME: Suzy Rodríguez  MRN:   [id number]  :   1985  ACCT. NUMBER: [id number]  DATE OF SERVICE: 20  START TIME: 11:00 AM  END TIME: 11:50 AM  FACILITATOR: Radhika Stevenson LICSW  TOPIC: MH EBP Group: Emotions Management  Adult Mental Health Day Treatment  TRACK: 5A  Telemedicine Visit: The patient's condition can be safely assessed and treated via synchronous audio and visual telemedicine encounter.      Reason for Telemedicine Visit: Services only offered telehealth    Originating Site (Patient Location): Patient's home    Distant Site (Provider Location): [facility]: [facility] [place]    Consent:  The patient/guardian has verbally consented to: the potential risks and benefits of telemedicine (video visit) versus in person care; bill my insurance or make self-payment for services provided; and responsibility for payment of non-covered services.     Mode of Communication:  Video Conference via [employer]    As the provider I attest to compliance with applicable laws and regulations related to telemedicine.     NUMBER OF PARTICIPANTS: 3    Summary of Group / Topics Discussed:  Emotions Management: Check Facts: Patients participated in an interactive approach to identifying and challenging cognitive distortions that arise following an event that triggers intense emotion.  Patients choose an emotional reaction/event to work on.  The group shared their experiences and thought processes for feedback.      Patient Session Goals / Objectives:    Learn the process of identifying thoughts associated with the situation and reaction    Learn to challenge cognitive distortions and reframe the situation/event/reaction     Distinguish between facts, feelings, thoughts    Gain understanding of how to interpret an emotional reaction    Practice identification of cognitive distortions and evaluating an emotionally charged situation more  rationally/objectively/mindfully      Patient Participation / Response:  Fully participated with the group by sharing personal reflections / insights and openly received / provided feedback with other participants.    Demonstrated understanding of topics discussed through group discussion and participation    Treatment Plan:  Patient has a current master individualized treatment plan.  See Epic treatment plan for more information.    Radhika Johnson, LICSW   PAST MEDICAL HISTORY:  Gestational diabetes      PAST MEDICAL HISTORY:  Gestational diabetes     Hypothyroidism

## 2023-08-23 ENCOUNTER — ALLIED HEALTH/NURSE VISIT (OUTPATIENT)
Dept: FAMILY MEDICINE | Facility: CLINIC | Age: 38
End: 2023-08-23
Payer: COMMERCIAL

## 2023-08-23 DIAGNOSIS — Z11.1 VISIT FOR MANTOUX TEST: Primary | ICD-10-CM

## 2023-08-23 PROCEDURE — 86580 TB INTRADERMAL TEST: CPT

## 2023-08-23 PROCEDURE — 99207 PR NO CHARGE NURSE ONLY: CPT

## 2023-08-25 ENCOUNTER — ALLIED HEALTH/NURSE VISIT (OUTPATIENT)
Dept: FAMILY MEDICINE | Facility: CLINIC | Age: 38
End: 2023-08-25
Payer: COMMERCIAL

## 2023-08-25 DIAGNOSIS — Z11.1 VISIT FOR MANTOUX TEST: Primary | ICD-10-CM

## 2023-08-25 LAB
PPDINDURATION: 0 MM (ref 0–4.99)
PPDREDNESS: NORMAL

## 2023-08-25 PROCEDURE — 99207 PR NO CHARGE NURSE ONLY: CPT

## 2023-08-25 NOTE — PROGRESS NOTES
Patient is here today for a Mantoux (TST) test results.    Did patient return to clinic 48-72 hours from Mantoux (TST) placement: Yes -     PPD Induration   Date Value Ref Range Status   08/25/2023 0 0 - 4.99 mm Final     PPD Redness   Date Value Ref Range Status   08/25/2023 Not Present  Final       Induration Size? Induration <5mm - Enter results in Enter/Edit Activity. Route results to ordering provider.     Patient needs form signed? No    Patient reports having previously had the BCG Vaccine: No    Does patient need a two step?  Yes - placed order according to standing order or notified LP for need for next TST.  Instructed patient when to return to the clinic.        Patient already has appt scheduled for two step.    Rani Patel RN on 8/25/2023 at 11:12 AM

## 2023-08-29 ENCOUNTER — ALLIED HEALTH/NURSE VISIT (OUTPATIENT)
Dept: FAMILY MEDICINE | Facility: CLINIC | Age: 38
End: 2023-08-29
Payer: COMMERCIAL

## 2023-08-29 DIAGNOSIS — Z11.1 VISIT FOR MANTOUX TEST: Primary | ICD-10-CM

## 2023-08-29 PROCEDURE — 99207 PR NO CHARGE NURSE ONLY: CPT

## 2023-08-29 PROCEDURE — 86580 TB INTRADERMAL TEST: CPT

## 2023-08-29 NOTE — PROGRESS NOTES
Patient is here today for a Mantoux (TST) test placement.    Is there a current order in the chart? Yes    Reason for Mantoux (TST) in patient's own words: She is needing it for school.    Patient needs form signed? No - form not needed per patient.    Instructed patient to wait for 15 minutes post injection and to report any reactions immediately to staff.    Told patient to return to clinic in 48-72 hours to have Mantoux (TST) read.       Emily Valente, CMA

## 2023-09-01 ENCOUNTER — ALLIED HEALTH/NURSE VISIT (OUTPATIENT)
Dept: FAMILY MEDICINE | Facility: CLINIC | Age: 38
End: 2023-09-01
Payer: COMMERCIAL

## 2023-09-01 DIAGNOSIS — Z11.1 VISIT FOR MANTOUX TEST: Primary | ICD-10-CM

## 2023-09-01 LAB
PPDINDURATION: 0 MM (ref 0–4.99)
PPDREDNESS: NORMAL

## 2023-09-01 PROCEDURE — 99207 PR NO CHARGE NURSE ONLY: CPT

## 2023-09-01 NOTE — PROGRESS NOTES
Patient is here today for a Mantoux (TST) test results.    Did patient return to clinic 48-72 hours from Mantoux (TST) placement: Yes -     PPD Induration   Date Value Ref Range Status   09/01/2023 0 0 - 4.99 mm Final     PPD Redness   Date Value Ref Range Status   09/01/2023 Not Present  Final           Induration Size? Induration <5mm - Enter results in Enter/Edit Activity. Route results to ordering provider.     Patient needs form signed? No    Patient reports having previously had the BCG Vaccine: No    Does patient need a two step? No        Normal TB read.  Rani Patel RN on 9/1/2023 at 9:12 AM

## 2023-11-22 ENCOUNTER — LAB (OUTPATIENT)
Dept: LAB | Facility: CLINIC | Age: 38
End: 2023-11-22
Payer: COMMERCIAL

## 2023-11-22 DIAGNOSIS — R74.8 ELEVATED ALKALINE PHOSPHATASE LEVEL: ICD-10-CM

## 2023-11-22 DIAGNOSIS — E03.8 SECONDARY HYPOTHYROIDISM: ICD-10-CM

## 2023-11-22 LAB
ALBUMIN SERPL BCG-MCNC: 4.5 G/DL (ref 3.5–5.2)
ALP SERPL-CCNC: 95 U/L (ref 40–150)
ALT SERPL W P-5'-P-CCNC: 22 U/L (ref 0–50)
AST SERPL W P-5'-P-CCNC: 21 U/L (ref 0–45)
BILIRUB DIRECT SERPL-MCNC: <0.2 MG/DL (ref 0–0.3)
BILIRUB SERPL-MCNC: 0.3 MG/DL
PROT SERPL-MCNC: 6.6 G/DL (ref 6.4–8.3)
T4 FREE SERPL-MCNC: 1.46 NG/DL (ref 0.9–1.7)
TSH SERPL DL<=0.005 MIU/L-ACNC: 2.27 UIU/ML (ref 0.3–4.2)

## 2023-11-22 PROCEDURE — 84080 ASSAY ALKALINE PHOSPHATASES: CPT | Mod: 90

## 2023-11-22 PROCEDURE — 80076 HEPATIC FUNCTION PANEL: CPT

## 2023-11-22 PROCEDURE — 84439 ASSAY OF FREE THYROXINE: CPT

## 2023-11-22 PROCEDURE — 86800 THYROGLOBULIN ANTIBODY: CPT

## 2023-11-22 PROCEDURE — 86376 MICROSOMAL ANTIBODY EACH: CPT

## 2023-11-22 PROCEDURE — 99000 SPECIMEN HANDLING OFFICE-LAB: CPT

## 2023-11-22 PROCEDURE — 36415 COLL VENOUS BLD VENIPUNCTURE: CPT

## 2023-11-22 PROCEDURE — 84443 ASSAY THYROID STIM HORMONE: CPT

## 2023-11-24 LAB
ALP BONE SERPL-MCNC: 11 UG/L
THYROGLOB AB SERPL IA-ACNC: <20 IU/ML
THYROPEROXIDASE AB SERPL-ACNC: <10 IU/ML

## 2023-11-27 ENCOUNTER — VIRTUAL VISIT (OUTPATIENT)
Dept: ENDOCRINOLOGY | Facility: CLINIC | Age: 38
End: 2023-11-27
Payer: COMMERCIAL

## 2023-11-27 DIAGNOSIS — Z98.84 GASTRIC BYPASS STATUS FOR OBESITY: ICD-10-CM

## 2023-11-27 DIAGNOSIS — R74.8 ELEVATED ALKALINE PHOSPHATASE LEVEL: ICD-10-CM

## 2023-11-27 DIAGNOSIS — C74.01 ADRENAL CORTEX CANCER, RIGHT (H): Primary | ICD-10-CM

## 2023-11-27 DIAGNOSIS — E03.8 SECONDARY HYPOTHYROIDISM: ICD-10-CM

## 2023-11-27 PROCEDURE — 99214 OFFICE O/P EST MOD 30 MIN: CPT | Mod: VID | Performed by: INTERNAL MEDICINE

## 2023-11-27 RX ORDER — LEVOTHYROXINE SODIUM 75 UG/1
75 TABLET ORAL DAILY
Qty: 90 TABLET | Refills: 3 | Status: SHIPPED | OUTPATIENT
Start: 2023-11-27 | End: 2024-09-06

## 2023-11-27 ASSESSMENT — PAIN SCALES - GENERAL: PAINLEVEL: NO PAIN (0)

## 2023-11-27 NOTE — PROGRESS NOTES
Virtual Visit Details    Type of service:  Video Visit     Originating Location (pt. Location): Home  Distant Location (provider location):  Off-site  Platform used for Video Visit: [place]  Joined the call at 11/27/2023, 9:09:09 am.  Left the call at 11/27/2023, 9:22:06 am.  =========================================================================================    Assessment     1. 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 Ki-67 revealed an increased proliferation index of approximately 20%.   The tumor was functional and presented with high cortisol and DHEAS levels.  Post surgery/radiation and subtherapeutic treatment with mitotane (from July 2018 to May 2021). Hydrocortisone was discontinued in October 2022. The DHEAS has remained undetectable.  Screening for hypercortisolism last year was negative.  Clinically, the patient does not endorse signs or symptoms suggestive of cortisol excess.      2. Hypothyroidism   Biochemically, she is euthyroid.  I recommended to continue to take the same dose of levothyroxine.     3.  Status post bariatric surgery  Recommended a daily intake from food products and//supplements of 0608-1768 mg calcium.  She should continue to take 2000 units vitamin D daily, as prior vitamin D levels have been normal while taking this dose.    No orders of the defined types were placed in this encounter.    =========================================================================================    The patient is seen in f/up for adrenocortical cancer.     Suzy Rodríguez is a 38 year old female with past medical history of PCOS, nephrolithiasis, MTHFR clotting disorder and gastric bypass surgery (2016), diagnosed with adrenal cortical carcinoma in June 2018.  The adrenal mass was first identified in May 2018, on an abdominal CT done for evaluation of kidney stones.  The mass measured 9.2 x 8 cm  "on the noncontrast CT from 5/8/18, and was inseparable from the liver and upper pole of the right kidney.  The chest CT from 6/14/18 identified 2 indeterminate solid pulmonary nodules, with the largest measuring 3 mm in the subpleural posterior left lower lobe.  The patient underwent right adrenalectomy on 6/25/18.  Surgery was complicated by diaphragmatic injury during hepatic mobilization and small fracture of the right liver lobe, managed conservatively.  The pathology report (re read at [facility]) revealed the following:  \"Adrenal cortical carcinoma, low grade, oncocytic type, forming a mass 11.3 cm and weighing 412 grams (per report). Surgical resection margins are positive for tumor (per report). Lymphovascular invasion is focally present. The tumor seems to be confined to the adrenal gland.  AJCC Stage (with available surgical materials): pT2NX (8th edition, 2017). Immunoperoxidase stains were performed on paraffin sections at the referring institution and reviewed at [facility] in [place], [place].  Tumor cells are positive for calretinin, synaptophysin and Melan A, supporting the above diagnosis. Ki-67 reveal an increased proliferation index of approximately 20% in the most active areas.\"    Treatment with mitotane was started in July 2018 and she completed radiation to the adrenal bed on October 8, 2018.  Titrating up the dose of mitotane was difficult. It was discontinued by Dr. Liang from [facility], in May 2021. The Mitotane level from 5/12/22 was undetectable.  Hydrocortisone was tapered down and discontinued in October 2022.  24-hour urinary cortisol from November 2022 was within normal limits.  DHEA-S remained undetectable, most recently checked in July 2023.  The PET/CT from May 2023 was unremarkable.     As a result of treatment with mitotane, she developed secondary hypothyroidism, which was treated with 125 mcg levothyroxine daily.  Following the discontinuation of mitotane, the dose " of levothyroxine was gradually titrated down to 75 mcg daily, which the patient has been taking since May 2022.  Most recent TFTs from 11/22/2023 were within normal limits.  Both thyroid peroxidase antibodies and antithyroglobulin antibodies were negative, on 11/22/2023.    Suzy was found to have a mildly elevated alkaline phosphatase of 145, September 2022.  Calcium, albumin, vitamin D and PTH levels have been normal, most recently checked in March 2023.  The alkaline phosphatase normalized on labs from this month.    Prior data prior to surgery:  5/14/18 normal plasma metanephrines  5/31/18 ALD 13.4, renin 0.8   5/28/18 urinary cortisol 351.6, 8 AM cortisol 23 (after dexamethasone), normal metanephrines     6/5/18 ACTH <10, DHEAS 740 (prior to surgery)  7/19/23 ACTH 39, DHEAS <15     Suzy underwent bariatric surgery in 6/2016. Pre-surgery weight 235 lb, inga post-surgery 135 lbs. Her weight in 4/2017 was 139 lbs. Her weight prior to adrenalectomy was 157 lbs.    The weight documented in our records was 145 lbs, in April of this year.  According to the patient, her current weight is 135 pounds and she attributes the weight loss to being more physically active and eating less.    She reports taking a daily multivitamin which contains 100 mg calcium and 1000 units vitamin D per serving.  In a regular day, she has 3-4 servings of dairy products.  In addition, she takes a calcium/vitamin D supplement, which contains 600 mg calcium and 1000 units vitamin D.    Past Medical History   Past Medical History:   Diagnosis Date    Adrenal cortex cancer, right (H) 6/30/2018    Resected 6/25/18, Dr. Mansfield.    Adrenal mass (H)     Anemia     thought due to heavy menses.    Calculus of ureter 5/9/2018    Carcinoma, adrenal cortical (H) 7/18/2018    Chocolate cyst of ovary 2011    Clotting disorder (H)     Depression     GERD (gastroesophageal reflux disease)     History of miscarriage     Hypertension due to endocrine  "disorder 2018    Kidney stones     passed on their own    Malignant neoplasm of cortex of right adrenal gland (H) 2018    EOTD; 18.  Check in May. SCP started.  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    SHREYA on CPAP 2015    Stopped using CPAP the  after weight loss as even at lower pressure/adjustment couldn't tolerate the cpap. Resting well, cautioned to watch for any signs of fatigue and consider \"titration study\" in the future to see if cpap is still required at her new weight.    Pneumonia     hx of recurrent pneumonia issues/respiratory infections.    Polycystic ovary syndrome     able to get pregnant, not on meds.    Pulmonary embolism (H)  or so    briefly on wafarin.    Sleep apnea     cpap    Vitamin D deficiency    Diverticulosis  Obstructive sleep apnea which resolved following gastric bypass  GERD  Iron deficiency  Anemia  Ovarian cyst   IUD inserted on 19  Duodenal polyp, removed in 2023    Past Surgical History   Past Surgical History:   Procedure Laterality Date    ADRENALECTOMY Right 2018    Procedure: ADRENALECTOMY;  Right Adrenalectomy,  Embolize Liver , Anesthesia Block;  Surgeon: Warren Mansfield MD;  Location: [facility] OR    ADRENALECTOMY      ADRENALECTOMY Right      SECTION      twice     SECTION      2     SECTION      x2    EMBOLECTOMY ABDOMEN N/A 2018    Procedure: EMBOLECTOMY ABDOMEN;;  Surgeon: Ector Mcintyre MD;  Location: [facility] OR    ENDOSCOPIC ULTRASOUND UPPER " GASTROINTESTINAL TRACT (GI) N/A 1/18/2023    Procedure: ENDOSCOPIC ULTRASOUND, ESOPHAGOSCOPY / UPPER GASTROINTESTINAL TRACT (GI), with cystgastrostomy stent placement and dilation;  Surgeon: Khoi Gomes MD;  Location: [facility] OR    ENDOSCOPIC ULTRASOUND UPPER GASTROINTESTINAL TRACT (GI) N/A 2/8/2023    Procedure: ENDOSCOPIC ULTRASOUND, UPPER GASTROINTESTINAL TRACT (GI), Esophagogastroduodenoscopy with biopsies;  Surgeon: Khoi Gomes MD;  Location: [facility] OR    ESOPHAGOSCOPY, GASTROSCOPY, DUODENOSCOPY (EGD), COMBINED N/A 12/13/2022    Procedure: ESOPHAGOGASTRODUODENOSCOPY,  NEEDLE  WITH ENDOSCOPIC ULTRASOUND GUIDANCE;  Surgeon: Khoi Gomes MD;  Location: [facility] OR    ESOPHAGOSCOPY, GASTROSCOPY, DUODENOSCOPY (EGD), COMBINED N/A 4/19/2023    Procedure: ESOPHAGOGASTRODUODENOSCOPY with stent removal;  Surgeon: Khoi Gomes MD;  Location: [facility] OR    EXTRACORPOREAL SHOCK WAVE LITHOTRIPSY (ESWL)      GASTRIC BYPASS  2016    ND CYSTO/URETERO W/LITHOTRIPSY &INDWELL STENT INSRT Left 5/11/2018    Procedure: CYSTOSCOPY, LEFT URETEROSCOPY LASER LITHOTRIPSY STENT INSERTION;  Surgeon: Skyler Delvalle MD;  Location: [facility] OR;  Service: Urology    ND LAP GASTRIC BYPASS/DERICK-EN-Y N/A 6/8/2016    Procedure: GASTRIC BYPASS LAPAROSCOPIC DERICK-EN-Y;  Surgeon: Randy Sutherland MD;  Location: [facility] OR;  Service: General    TUBAL LIGATION      WISDOM TOOTH EXTRACTION Bilateral        Current Medications    Current Outpatient Medications:     acetaminophen (TYLENOL) 325 MG tablet, Take 2 tablets (650 mg) by mouth every 4 hours as needed for other (multimodal surgical pain management along with NSAIDS and opioid medication as indicated based on pain control and physical function.), Disp: 150 tablet, Rfl: 0    buPROPion (WELLBUTRIN SR) 150 MG 12 hr tablet, Take 1 tablet (150 mg) by mouth 3 times daily, Disp: 270 tablet, Rfl: 1    clonazePAM (KLONOPIN) 0.5 MG tablet, Take 1 tablet (0.5 mg) by  mouth 2 times daily as needed for anxiety, Disp: 60 tablet, Rfl: 0    FLUoxetine (PROZAC) 20 MG capsule, Take 3 capsules (60 mg) by mouth 3 times daily, Disp: 270 capsule, Rfl: 1    hydrocortisone (CORTEF) 5 MG tablet, 2 tablets with breakfast, 1 tablet at lunch and 1/2 tablet at supper, Disp: 60 tablet, Rfl: 1    levothyroxine (SYNTHROID/LEVOTHROID) 75 MCG tablet, Take 1 tablet (75 mcg) by mouth daily, Disp: 90 tablet, Rfl: 2    methylphenidate (RITALIN) 20 MG tablet, Take 20 mg by mouth 2 times daily, Disp: , Rfl:     temazepam (RESTORIL) 15 MG capsule, Take 1-2 capsules (15-30 mg) by mouth nightly as needed for sleep, Disp: 60 capsule, Rfl: 5  Iron supplement daily for 6 months     Family History   Problem Relation Age of Onset    Depression Paternal Grandmother     Diabetes Mother     Diabetes Father     Hypertension Father     Chronic Obstructive Pulmonary Disease Father     Cancer Maternal Grandmother         gastric    Urolithiasis Maternal Grandmother     Heart Disease Maternal Grandfather     Cancer Maternal Grandfather         lung    Heart Disease Paternal Grandfather     Breast Cancer No family hx of     Colon Cancer No family hx of     Prostate Cancer No family hx of     Cerebrovascular Disease No family hx of     Clotting Disorder No family hx of     Gout No family hx of        Social History   with 2 children, 7 and 10-year-old. She denies smoking, drinking alcohol or using illicit drugs. Occupation: disabled.     Review of Systems   Back on adderal   Occasional constipation   No tremor or palpitations   She has been feeling colder lately   No night sweats or increased sweating   No nausea or vomiting   No joint or bone pain               Vital Signs     Previous Weights:    Wt Readings from Last 10 Encounters:   04/19/23 66 kg (145 lb 8.1 oz)   03/21/23 67.3 kg (148 lb 6.4 oz)   02/08/23 68.7 kg (151 lb 7.3 oz)   01/18/23 69 kg (152 lb 1.9 oz)   01/09/23 71.4 kg (157 lb 5 oz)   12/13/22 71.1  kg (156 lb 12 oz)   11/21/22 71.7 kg (158 lb)   05/05/22 70.3 kg (155 lb)   02/01/22 71 kg (156 lb 8 oz)   01/18/22 70.5 kg (155 lb 8 oz)        There were no vitals taken for this visit.    Physical Exam  General Appearance: alert, no distress noted   Eyes: grossly normal to inspection, conjunctivae and sclerae normal, no lid lag or stare   Respiratory: no audible wheeze, cough, or visible cyanosis.  No visible retractions or increased work of breathing.  Able to speak fully in complete sentences.  Neurological: Cranial nerves grossly intact, mentation intact and speech normal; no tremor of the outstretched hands   Skin: no lesions on exposed skin   Psychological: mentation appears normal, affect normal, judgement and insight intact, normal speech and appearance well-groomed    Lab Results  I reviewed prior lab results documented in Epic.  TSH   Date Value Ref Range Status   11/22/2023 2.27 0.30 - 4.20 uIU/mL Final   07/19/2023 1.95 0.30 - 4.20 uIU/mL Final   03/24/2023 2.26 0.30 - 4.20 uIU/mL Final   01/09/2023 1.98 0.30 - 4.20 uIU/mL Final   10/25/2022 3.14 0.30 - 4.20 uIU/mL Final   05/12/2022 0.16 (L) 0.40 - 4.00 mU/L Final   03/24/2022 0.08 (L) 0.40 - 4.00 mU/L Final   02/08/2022 0.29 (L) 0.40 - 4.00 mU/L Final   01/18/2022 0.72 0.40 - 4.00 mU/L Final   12/31/2021 0.27 (L) 0.40 - 4.00 mU/L Final   07/05/2021 0.22 (L) 0.40 - 4.00 mU/L Final   06/04/2021 0.44 0.40 - 4.00 mU/L Final   05/10/2021 0.18 (L) 0.40 - 4.00 mU/L Final   04/13/2021 0.39 (L) 0.40 - 4.00 mU/L Final   03/17/2021 0.18 (L) 0.40 - 4.00 mU/L Final     T4 Free   Date Value Ref Range Status   07/05/2021 0.91 0.76 - 1.46 ng/dL Final   06/04/2021 0.93 0.76 - 1.46 ng/dL Final   05/10/2021 0.84 0.76 - 1.46 ng/dL Final   04/13/2021 0.76 0.76 - 1.46 ng/dL Final   03/17/2021 0.82 0.76 - 1.46 ng/dL Final     Free T4   Date Value Ref Range Status   11/22/2023 1.46 0.90 - 1.70 ng/dL Final   03/24/2023 1.25 0.90 - 1.70 ng/dL Final   01/09/2023 1.23 0.90 -  1.70 ng/dL Final   10/25/2022 0.94 0.90 - 1.70 ng/dL Final   09/29/2022 1.02 0.90 - 1.70 ng/dL Final     31 minutes spent on the date of the encounter doing chart review, history and exam, documentation and further activities as noted above.

## 2023-11-27 NOTE — NURSING NOTE
Is the patient currently in the state of [place]? YES    Visit mode:VIDEO    If the visit is dropped, the patient can be reconnected by: VIDEO VISIT: Send to e-mail at: [email or web address]    Will anyone else be joining the visit? NO  (If patient encounters technical issues they should call [phone]    How would you like to obtain your AVS? MyChart    Are changes needed to the allergy or medication list? No    Reason for visit: RECHECK Shelby Kocher VVF

## 2023-11-27 NOTE — LETTER
11/27/2023       RE: [place] Rodríguez  [address]  [facility] [address]     Dear Colleague,    Thank you for referring your patient, [place]zy Rodríguez, to the [facility]. Please see a copy of my visit note below.    Virtual Visit Details    Type of service:  Video Visit     Originating Location (pt. Location): Home  Distant Location (provider location):  Off-site  Platform used for Video Visit: [place]  Joined the call at 11/27/2023, 9:09:09 am.  Left the call at 11/27/2023, 9:22:06 am.  =========================================================================================    Assessment     1. 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 Ki-67 revealed an increased proliferation index of approximately 20%.   The tumor was functional and presented with high cortisol and DHEAS levels.  Post surgery/radiation and subtherapeutic treatment with mitotane (from July 2018 to May 2021). Hydrocortisone was discontinued in October 2022. The DHEAS has remained undetectable.  Screening for hypercortisolism last year was negative.  Clinically, the patient does not endorse signs or symptoms suggestive of cortisol excess.      2. Hypothyroidism   Biochemically, she is euthyroid.  I recommended to continue to take the same dose of levothyroxine.     3.  Status post bariatric surgery  Recommended a daily intake from food products and//supplements of 4427-8284 mg calcium.  She should continue to take 2000 units vitamin D daily, as prior vitamin D levels have been normal while taking this dose.    No orders of the defined types were placed in this encounter.    =========================================================================================    The patient is seen in f/up for adrenocortical cancer.     Suzy Rodríguez is a 38  "year old female with past medical history of PCOS, nephrolithiasis, MTHFR clotting disorder and gastric bypass surgery (2016), diagnosed with adrenal cortical carcinoma in June 2018.  The adrenal mass was first identified in May 2018, on an abdominal CT done for evaluation of kidney stones.  The mass measured 9.2 x 8 cm on the noncontrast CT from 5/8/18, and was inseparable from the liver and upper pole of the right kidney.  The chest CT from 6/14/18 identified 2 indeterminate solid pulmonary nodules, with the largest measuring 3 mm in the subpleural posterior left lower lobe.  The patient underwent right adrenalectomy on 6/25/18.  Surgery was complicated by diaphragmatic injury during hepatic mobilization and small fracture of the right liver lobe, managed conservatively.  The pathology report (re read at [facility]) revealed the following:  \"Adrenal cortical carcinoma, low grade, oncocytic type, forming a mass 11.3 cm and weighing 412 grams (per report). Surgical resection margins are positive for tumor (per report). Lymphovascular invasion is focally present. The tumor seems to be confined to the adrenal gland.  AJCC Stage (with available surgical materials): pT2NX (8th edition, 2017). Immunoperoxidase stains were performed on paraffin sections at the referring institution and reviewed at [facility] in [place], [place].  Tumor cells are positive for calretinin, synaptophysin and Melan A, supporting the above diagnosis. Ki-67 reveal an increased proliferation index of approximately 20% in the most active areas.\"    Treatment with mitotane was started in July 2018 and she completed radiation to the adrenal bed on October 8, 2018.  Titrating up the dose of mitotane was difficult. It was discontinued by Dr. Liang from [facility], in May 2021. The Mitotane level from 5/12/22 was undetectable.  Hydrocortisone was tapered down and discontinued in October 2022.  24-hour urinary cortisol from November 2022 was " within normal limits.  DHEA-S remained undetectable, most recently checked in July 2023.  The PET/CT from May 2023 was unremarkable.     As a result of treatment with mitotane, she developed secondary hypothyroidism, which was treated with 125 mcg levothyroxine daily.  Following the discontinuation of mitotane, the dose of levothyroxine was gradually titrated down to 75 mcg daily, which the patient has been taking since May 2022.  Most recent TFTs from 11/22/2023 were within normal limits.  Both thyroid peroxidase antibodies and antithyroglobulin antibodies were negative, on 11/22/2023.    Suzy was found to have a mildly elevated alkaline phosphatase of 145, September 2022.  Calcium, albumin, vitamin D and PTH levels have been normal, most recently checked in March 2023.  The alkaline phosphatase normalized on labs from this month.    Prior data prior to surgery:  5/14/18 normal plasma metanephrines  5/31/18 ALD 13.4, renin 0.8   5/28/18 urinary cortisol 351.6, 8 AM cortisol 23 (after dexamethasone), normal metanephrines     6/5/18 ACTH <10, DHEAS 740 (prior to surgery)  7/19/23 ACTH 39, DHEAS <15     Suzy underwent bariatric surgery in 6/2016. Pre-surgery weight 235 lb, inga post-surgery 135 lbs. Her weight in 4/2017 was 139 lbs. Her weight prior to adrenalectomy was 157 lbs.    The weight documented in our records was 145 lbs, in April of this year.  According to the patient, her current weight is 135 pounds and she attributes the weight loss to being more physically active and eating less.    She reports taking a daily multivitamin which contains 100 mg calcium and 1000 units vitamin D per serving.  In a regular day, she has 3-4 servings of dairy products.  In addition, she takes a calcium/vitamin D supplement, which contains 600 mg calcium and 1000 units vitamin D.    Past Medical History   Past Medical History:   Diagnosis Date    Adrenal cortex cancer, right (H) 6/30/2018    Resected 6/25/18,   "Weight.    Adrenal mass (H)     Anemia     thought due to heavy menses.    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  Check in May. SCP started.  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) June 25, 2018   Cushing's syndrome, secondary to #1 (300 mcg/24 hr); Rx hydrocortisone 20 + 10 post op   Post operative defect right hemidiaphragm with ?worsening right effussion, not present preop   Currently on mitotane, 500 mg, BID x 1 week + hydrocortisone    Menstrual disorder     menorrhagia    MTHFR mutation     MTHFR mutation     believed to be homozygous for the mutation.    SHREYA on CPAP 12/17/2015    Stopped using CPAP the Fall of 2016 after weight loss as even at lower pressure/adjustment couldn't tolerate the cpap. Resting well, cautioned to watch for any signs of fatigue and consider \"titration study\" in the future to see if cpap is still required at her new weight.    Pneumonia     hx of recurrent pneumonia issues/respiratory infections.    Polycystic ovary syndrome     able to get pregnant, not on meds.    Pulmonary embolism (H) 2009 or so    briefly on wafarin.    Sleep apnea     cpap    Vitamin D deficiency    Diverticulosis  Obstructive sleep apnea which resolved following gastric bypass  GERD  Iron deficiency  Anemia  Ovarian cyst   IUD inserted on 1/25/19  Duodenal polyp, removed in February 2023    Past Surgical History   Past Surgical History:   Procedure Laterality Date    ADRENALECTOMY Right 6/25/2018    Procedure: ADRENALECTOMY;  Right Adrenalectomy,  Embolize Liver , Anesthesia Block;  Surgeon: Weight, " Warren Patel MD;  Location: [facility] OR    ADRENALECTOMY      ADRENALECTOMY Right      SECTION      twice     SECTION      2     SECTION      x2    EMBOLECTOMY ABDOMEN N/A 2018    Procedure: EMBOLECTOMY ABDOMEN;;  Surgeon: Ector Mcintyre MD;  Location: [facility] OR    ENDOSCOPIC ULTRASOUND UPPER GASTROINTESTINAL TRACT (GI) N/A 2023    Procedure: ENDOSCOPIC ULTRASOUND, ESOPHAGOSCOPY / UPPER GASTROINTESTINAL TRACT (GI), with cystgastrostomy stent placement and dilation;  Surgeon: Khoi Gomes MD;  Location: [facility] OR    ENDOSCOPIC ULTRASOUND UPPER GASTROINTESTINAL TRACT (GI) N/A 2023    Procedure: ENDOSCOPIC ULTRASOUND, UPPER GASTROINTESTINAL TRACT (GI), Esophagogastroduodenoscopy with biopsies;  Surgeon: Khoi Gomes MD;  Location: [facility] OR    ESOPHAGOSCOPY, GASTROSCOPY, DUODENOSCOPY (EGD), COMBINED N/A 2022    Procedure: ESOPHAGOGASTRODUODENOSCOPY,  NEEDLE  WITH ENDOSCOPIC ULTRASOUND GUIDANCE;  Surgeon: Khoi Gomes MD;  Location: [facility] OR    ESOPHAGOSCOPY, GASTROSCOPY, DUODENOSCOPY (EGD), COMBINED N/A 2023    Procedure: ESOPHAGOGASTRODUODENOSCOPY with stent removal;  Surgeon: Khoi Gomes MD;  Location: [facility] OR    EXTRACORPOREAL SHOCK WAVE LITHOTRIPSY (ESWL)      GASTRIC BYPASS      MA CYSTO/URETERO W/LITHOTRIPSY &INDWELL STENT INSRT Left 2018    Procedure: CYSTOSCOPY, LEFT URETEROSCOPY LASER LITHOTRIPSY STENT INSERTION;  Surgeon: Skyler Delvalle MD;  Location: [facility] OR;  Service: Urology    MA LAP GASTRIC BYPASS/DERICK-EN-Y N/A 2016    Procedure: GASTRIC BYPASS LAPAROSCOPIC DERICK-EN-Y;  Surgeon: Randy Sutherland MD;  Location: [facility] OR;  Service: General    TUBAL LIGATION      WISDOM TOOTH EXTRACTION Bilateral        Current Medications    Current Outpatient Medications:     acetaminophen (TYLENOL) 325 MG tablet, Take 2 tablets (650 mg) by mouth every 4 hours as needed for other (multimodal surgical pain  management along with NSAIDS and opioid medication as indicated based on pain control and physical function.), Disp: 150 tablet, Rfl: 0    buPROPion (WELLBUTRIN SR) 150 MG 12 hr tablet, Take 1 tablet (150 mg) by mouth 3 times daily, Disp: 270 tablet, Rfl: 1    clonazePAM (KLONOPIN) 0.5 MG tablet, Take 1 tablet (0.5 mg) by mouth 2 times daily as needed for anxiety, Disp: 60 tablet, Rfl: 0    FLUoxetine (PROZAC) 20 MG capsule, Take 3 capsules (60 mg) by mouth 3 times daily, Disp: 270 capsule, Rfl: 1    hydrocortisone (CORTEF) 5 MG tablet, 2 tablets with breakfast, 1 tablet at lunch and 1/2 tablet at supper, Disp: 60 tablet, Rfl: 1    levothyroxine (SYNTHROID/LEVOTHROID) 75 MCG tablet, Take 1 tablet (75 mcg) by mouth daily, Disp: 90 tablet, Rfl: 2    methylphenidate (RITALIN) 20 MG tablet, Take 20 mg by mouth 2 times daily, Disp: , Rfl:     temazepam (RESTORIL) 15 MG capsule, Take 1-2 capsules (15-30 mg) by mouth nightly as needed for sleep, Disp: 60 capsule, Rfl: 5  Iron supplement daily for 6 months     Family History   Problem Relation Age of Onset    Depression Paternal Grandmother     Diabetes Mother     Diabetes Father     Hypertension Father     Chronic Obstructive Pulmonary Disease Father     Cancer Maternal Grandmother         gastric    Urolithiasis Maternal Grandmother     Heart Disease Maternal Grandfather     Cancer Maternal Grandfather         lung    Heart Disease Paternal Grandfather     Breast Cancer No family hx of     Colon Cancer No family hx of     Prostate Cancer No family hx of     Cerebrovascular Disease No family hx of     Clotting Disorder No family hx of     Gout No family hx of        Social History   with 2 children, 7 and 10-year-old. She denies smoking, drinking alcohol or using illicit drugs. Occupation: disabled.     Review of Systems   Back on adderal   Occasional constipation   No tremor or palpitations   She has been feeling colder lately   No night sweats or increased  sweating   No nausea or vomiting   No joint or bone pain               Vital Signs     Previous Weights:    Wt Readings from Last 10 Encounters:   04/19/23 66 kg (145 lb 8.1 oz)   03/21/23 67.3 kg (148 lb 6.4 oz)   02/08/23 68.7 kg (151 lb 7.3 oz)   01/18/23 69 kg (152 lb 1.9 oz)   01/09/23 71.4 kg (157 lb 5 oz)   12/13/22 71.1 kg (156 lb 12 oz)   11/21/22 71.7 kg (158 lb)   05/05/22 70.3 kg (155 lb)   02/01/22 71 kg (156 lb 8 oz)   01/18/22 70.5 kg (155 lb 8 oz)        There were no vitals taken for this visit.    Physical Exam  General Appearance: alert, no distress noted   Eyes: grossly normal to inspection, conjunctivae and sclerae normal, no lid lag or stare   Respiratory: no audible wheeze, cough, or visible cyanosis.  No visible retractions or increased work of breathing.  Able to speak fully in complete sentences.  Neurological: Cranial nerves grossly intact, mentation intact and speech normal; no tremor of the outstretched hands   Skin: no lesions on exposed skin   Psychological: mentation appears normal, affect normal, judgement and insight intact, normal speech and appearance well-groomed    Lab Results  I reviewed prior lab results documented in Epic.  TSH   Date Value Ref Range Status   11/22/2023 2.27 0.30 - 4.20 uIU/mL Final   07/19/2023 1.95 0.30 - 4.20 uIU/mL Final   03/24/2023 2.26 0.30 - 4.20 uIU/mL Final   01/09/2023 1.98 0.30 - 4.20 uIU/mL Final   10/25/2022 3.14 0.30 - 4.20 uIU/mL Final   05/12/2022 0.16 (L) 0.40 - 4.00 mU/L Final   03/24/2022 0.08 (L) 0.40 - 4.00 mU/L Final   02/08/2022 0.29 (L) 0.40 - 4.00 mU/L Final   01/18/2022 0.72 0.40 - 4.00 mU/L Final   12/31/2021 0.27 (L) 0.40 - 4.00 mU/L Final   07/05/2021 0.22 (L) 0.40 - 4.00 mU/L Final   06/04/2021 0.44 0.40 - 4.00 mU/L Final   05/10/2021 0.18 (L) 0.40 - 4.00 mU/L Final   04/13/2021 0.39 (L) 0.40 - 4.00 mU/L Final   03/17/2021 0.18 (L) 0.40 - 4.00 mU/L Final     T4 Free   Date Value Ref Range Status   07/05/2021 0.91 0.76 - 1.46  ng/dL Final   06/04/2021 0.93 0.76 - 1.46 ng/dL Final   05/10/2021 0.84 0.76 - 1.46 ng/dL Final   04/13/2021 0.76 0.76 - 1.46 ng/dL Final   03/17/2021 0.82 0.76 - 1.46 ng/dL Final     Free T4   Date Value Ref Range Status   11/22/2023 1.46 0.90 - 1.70 ng/dL Final   03/24/2023 1.25 0.90 - 1.70 ng/dL Final   01/09/2023 1.23 0.90 - 1.70 ng/dL Final   10/25/2022 0.94 0.90 - 1.70 ng/dL Final   09/29/2022 1.02 0.90 - 1.70 ng/dL Final     31 minutes spent on the date of the encounter doing chart review, history and exam, documentation and further activities as noted above.        Veena Jaquez MD

## 2023-12-28 ENCOUNTER — MYC MEDICAL ADVICE (OUTPATIENT)
Dept: FAMILY MEDICINE | Facility: CLINIC | Age: 38
End: 2023-12-28
Payer: COMMERCIAL

## 2023-12-28 DIAGNOSIS — K92.1 BLOOD IN STOOL: Primary | ICD-10-CM

## 2024-02-04 ENCOUNTER — HEALTH MAINTENANCE LETTER (OUTPATIENT)
Age: 39
End: 2024-02-04

## 2024-02-08 ENCOUNTER — E-VISIT (OUTPATIENT)
Dept: FAMILY MEDICINE | Facility: CLINIC | Age: 39
End: 2024-02-08
Payer: COMMERCIAL

## 2024-02-08 DIAGNOSIS — R51.9 NONINTRACTABLE HEADACHE, UNSPECIFIED CHRONICITY PATTERN, UNSPECIFIED HEADACHE TYPE: Primary | ICD-10-CM

## 2024-02-08 PROCEDURE — 99207 PR NON-BILLABLE SERV PER CHARTING: CPT | Performed by: FAMILY MEDICINE

## 2024-02-08 NOTE — PATIENT INSTRUCTIONS
Thank you for choosing us for your care. Based on the information provided, I believe you need to be seen in person.  Please go  urgent care or call to schedule an appt today at a clinic .     You will not be charged for this eVisit.

## 2024-02-12 ENCOUNTER — TELEPHONE (OUTPATIENT)
Dept: GASTROENTEROLOGY | Facility: CLINIC | Age: 39
End: 2024-02-12
Payer: COMMERCIAL

## 2024-02-12 NOTE — TELEPHONE ENCOUNTER
Attempted to contact patient in order to complete pre assessment questions.     No answer. Left message to return call to [phone] option 4    Missed call communication sent via [employer].      Procedure details:    Patient scheduled for Colonoscopy  on 2.21.2024.     Arrival time: 1345. Procedure time 1430    Pre op exam needed? N/A    Facility location: [facility]; [address] [place] [id number]    Sedation type: Conscious sedation     Indication for procedure: blood in stool      Chart review:     Electronic implanted devices? No    Recent diagnosis of diverticulitis within the last 6 weeks? No    Diabetic? No    Medication review:    Anticoagulants? No    NSAIDS? No NSAID medications per patient's medication list.  RN will verify with pre-assessment call.    Other medication HOLDING recommendations:  N/A      Prep for procedure:     Bowel prep recommendation: Standard Miralax  Due to: standard bowel prep.    Prep instructions sent via [place] [employer].       Joyce Austin RN  Endoscopy Procedure Pre Assessment RN

## 2024-02-12 NOTE — TELEPHONE ENCOUNTER
"Endoscopy Scheduling Screen    Have you had a positive Covid test in the last 14 days?  No      Are you active on MyChart?   Yes      What insurance is in the chart?  Other:  [employer] MEDICARE      Ordering/Referring Provider: DAIANA TATUM    (If ordering provider performs procedure, schedule with ordering provider unless otherwise instructed. )    BMI: Estimated body mass index is 24.98 kg/m  as calculated from the following:    Height as of 4/19/23: 1.626 m (5' 4\").    Weight as of 4/19/23: 66 kg (145 lb 8.1 oz).     Sedation Ordered  moderate sedation.   If patient BMI > 50 do not schedule in [facility].    If patient BMI > 45 do not schedule at [facility].    Are you taking methadone or Suboxone?  No    Have you had difficulties, pain, or discomfort during past endoscopy procedures?  No never had one      Are you taking any prescription medications for pain 3 or more times per week?   NO - No RN review required.      Do you have a history of malignant hyperthermia or adverse reaction to anesthesia?  No    (Females) Are you currently pregnant?   No       Have you been diagnosed or told you have pulmonary hypertension?   No      Do you have an LVAD?  No      Have you been told you have moderate to severe sleep apnea?  No      Have you been told you have COPD, asthma, or any other lung disease?  No      Do you have any heart conditions?  No       Have you ever had an organ transplant?   No      Have you ever had or are you awaiting a heart or lung transplant?   No      In the last 6 months have you had a stroke or transient ischemic attack (TIA aka \"mini stroke\")?   No      Have you been diagnosed with or been told you have cirrhosis of the liver?   No      Are you currently on dialysis?   No      Do you need assistance transferring?   No    BMI: Estimated body mass index is 24.98 kg/m  as calculated from the following:    Height as of 4/19/23: 1.626 m (5' 4\").    Weight as of 4/19/23: 66 kg (145 lb 8.1 oz).     Is " patients BMI > 40 and scheduling location [facility]?  No      Do you take an injectable medication for weight loss or diabetes (excluding insulin)?  No      Do you take the medication Naltrexone?  No      Do you take blood thinners?  No       Prep   Are you currently on dialysis or do you have chronic kidney disease?  No      Do you have a diagnosis of diabetes?  No      Do you have a diagnosis of cystic fibrosis (CF)?  No      On a regular basis do you go 3 -5 days between bowel movements?  No      BMI > 40?  No    Preferred Pharmacy:    [place], [place] - [address]  [address] [facility] [address]  Phone: [phone] Fax: [phone]      Final Scheduling Details   Colonoscopy prep sent?  PER RN REVIEW    Procedure scheduled  Colonoscopy    Surgeon:  SIERRA     Date of procedure:  2/21/24     Pre-OP / PAC:   No - Not required for this site.    Location  [facility] available    Sedation   Moderate Sedation - Per order.      Patient Reminders:   You will receive a call from a Nurse to review instructions and health history.  This assessment must be completed prior to your procedure.  Failure to complete the Nurse assessment may result in the procedure being cancelled.      On the day of your procedure, please designate an adult(s) who can drive you home stay with you for the next 24 hours. The medicines used in the exam will make you sleepy. You will not be able to drive.      You cannot take public transportation, ride share services, or non-medical taxi service without a responsible caregiver.  Medical transport services are allowed with the requirement that a responsible caregiver will receive you at your destination.  We require that drivers and caregivers are confirmed prior to your procedure.

## 2024-02-14 ENCOUNTER — TELEPHONE (OUTPATIENT)
Dept: GASTROENTEROLOGY | Facility: CLINIC | Age: 39
End: 2024-02-14
Payer: COMMERCIAL

## 2024-02-14 NOTE — TELEPHONE ENCOUNTER
Caller:   Reason for Reschedule/Cancellation (please be detailed, any staff messages or encounters to note?):       Prior to reschedule please review:  Ordering Provider:     DAIANA TATUM     Sedation Determined: CS  Does patient have any ASC Exclusions, please identify?:       Notes on Cancelled Procedure:  Procedure: Lower Endoscopy [Colonoscopy]   Date: 2/21  Location: [facility]; [address] [place] [address]  Surgeon: SIERRA      Rescheduled: Yes  Procedure: Lower Endoscopy [Colonoscopy]   Date: 3/11  Location: [facility]; [address] [place] [address]  Surgeon: WISE  Sedation Level Scheduled  CS,  Reason for Sedation Level ORDER  Prep/Instructions updated and sent: Y       Send In - basket message to Panc - Ron Pool if [facility]  procedure is canceled or rescheduled: [ N/A, YES or NO]

## 2024-02-14 NOTE — TELEPHONE ENCOUNTER
Second call attempt to complete pre assessment.     Patient answered and stated she does not have a  available on 2/21/24 and needs to reschedule.     Warm transferred call to Ani in endo scheduling.       Rani Pathak RN  Endoscopy Procedure Pre Assessment RN   S1-S2/[id number]

## 2024-02-26 NOTE — TELEPHONE ENCOUNTER
Rescheduled Colonoscopy    Pre assessment completed for upcoming procedure.      Procedure details:    Patient scheduled for Colonoscopy on 3/11/24.     Arrival time: 1145. Procedure time 1230    Pre op exam needed? N/A    Facility location: [facility]; [address] [place] [address] [place] [address]    Sedation type: Conscious sedation     Indication for procedure: Blood in stool    Designated  policy reviewed. Instructed to have someone stay 6 hours post procedure.       Chart review:     Electronic implanted devices? No    Recent diagnosis of diverticulitis within the last 6 weeks?  No    Diabetic? No      Medication review:    Anticoagulants? No    NSAIDS? No    Other medication HOLDING recommendations:  Ferrous sulfate (iron supplements): HOLD 7 days before procedure.      Prep for procedure:     Bowel prep recommendation: Standard Miralax  Due to: standard bowel prep.    Prep instructions sent via [employer]     Reviewed procedure prep instructions.     Patient verbalized understanding and had no questions or concerns at this time.        Rani Pathak RN  Endoscopy Procedure Pre Assessment RN  [phone] option 4

## 2024-03-11 ENCOUNTER — HOSPITAL ENCOUNTER (OUTPATIENT)
Facility: CLINIC | Age: 39
Discharge: HOME | End: 2024-03-11
Attending: INTERNAL MEDICINE | Admitting: INTERNAL MEDICINE
Payer: COMMERCIAL

## 2024-03-11 VITALS
RESPIRATION RATE: 28 BRPM | BODY MASS INDEX: 23.05 KG/M2 | SYSTOLIC BLOOD PRESSURE: 112 MMHG | DIASTOLIC BLOOD PRESSURE: 64 MMHG | OXYGEN SATURATION: 100 % | WEIGHT: 135 LBS | HEART RATE: 108 BPM | HEIGHT: 64 IN

## 2024-03-11 LAB — COLONOSCOPY: NORMAL

## 2024-03-11 PROCEDURE — G0500 MOD SEDAT ENDO SERVICE >5YRS: HCPCS | Performed by: INTERNAL MEDICINE

## 2024-03-11 PROCEDURE — 45378 DIAGNOSTIC COLONOSCOPY: CPT | Performed by: INTERNAL MEDICINE

## 2024-03-11 PROCEDURE — 250N000011 HC RX IP 250 OP 636: Performed by: INTERNAL MEDICINE

## 2024-03-11 RX ORDER — NALOXONE HYDROCHLORIDE 0.4 MG/ML
0.4 INJECTION, SOLUTION INTRAMUSCULAR; INTRAVENOUS; SUBCUTANEOUS
Status: DISCONTINUED | OUTPATIENT
Start: 2024-03-11 | End: 2024-03-11 | Stop reason: HOSPADM

## 2024-03-11 RX ORDER — CALCIUM CARBONATE 500(1250)
1 TABLET ORAL DAILY
COMMUNITY

## 2024-03-11 RX ORDER — LIDOCAINE 40 MG/G
CREAM TOPICAL
Status: DISCONTINUED | OUTPATIENT
Start: 2024-03-11 | End: 2024-03-11 | Stop reason: HOSPADM

## 2024-03-11 RX ORDER — ONDANSETRON 2 MG/ML
4 INJECTION INTRAMUSCULAR; INTRAVENOUS EVERY 6 HOURS PRN
Status: DISCONTINUED | OUTPATIENT
Start: 2024-03-11 | End: 2024-03-11 | Stop reason: HOSPADM

## 2024-03-11 RX ORDER — FLUMAZENIL 0.1 MG/ML
0.2 INJECTION, SOLUTION INTRAVENOUS
Status: DISCONTINUED | OUTPATIENT
Start: 2024-03-11 | End: 2024-03-11 | Stop reason: HOSPADM

## 2024-03-11 RX ORDER — ONDANSETRON 4 MG/1
4 TABLET, ORALLY DISINTEGRATING ORAL EVERY 6 HOURS PRN
Status: DISCONTINUED | OUTPATIENT
Start: 2024-03-11 | End: 2024-03-11 | Stop reason: HOSPADM

## 2024-03-11 RX ORDER — DEXTROAMPHETAMINE SACCHARATE, AMPHETAMINE ASPARTATE, DEXTROAMPHETAMINE SULFATE AND AMPHETAMINE SULFATE 5; 5; 5; 5 MG/1; MG/1; MG/1; MG/1
25 TABLET ORAL DAILY
COMMUNITY
Start: 2024-02-15

## 2024-03-11 RX ORDER — NALOXONE HYDROCHLORIDE 0.4 MG/ML
0.2 INJECTION, SOLUTION INTRAMUSCULAR; INTRAVENOUS; SUBCUTANEOUS
Status: DISCONTINUED | OUTPATIENT
Start: 2024-03-11 | End: 2024-03-11 | Stop reason: HOSPADM

## 2024-03-11 RX ORDER — FENTANYL CITRATE 50 UG/ML
INJECTION, SOLUTION INTRAMUSCULAR; INTRAVENOUS PRN
Status: DISCONTINUED | OUTPATIENT
Start: 2024-03-11 | End: 2024-03-11 | Stop reason: HOSPADM

## 2024-03-11 RX ORDER — ONDANSETRON 2 MG/ML
INJECTION INTRAMUSCULAR; INTRAVENOUS PRN
Status: DISCONTINUED | OUTPATIENT
Start: 2024-03-11 | End: 2024-03-11 | Stop reason: HOSPADM

## 2024-03-11 RX ORDER — ONDANSETRON 2 MG/ML
4 INJECTION INTRAMUSCULAR; INTRAVENOUS
Status: DISCONTINUED | OUTPATIENT
Start: 2024-03-11 | End: 2024-03-11 | Stop reason: HOSPADM

## 2024-03-11 RX ORDER — PROCHLORPERAZINE MALEATE 10 MG
10 TABLET ORAL EVERY 6 HOURS PRN
Status: DISCONTINUED | OUTPATIENT
Start: 2024-03-11 | End: 2024-03-11 | Stop reason: HOSPADM

## 2024-03-11 RX ORDER — TRAZODONE HYDROCHLORIDE 100 MG/1
200 TABLET ORAL AT BEDTIME
COMMUNITY
Start: 2024-01-15

## 2024-03-11 ASSESSMENT — ACTIVITIES OF DAILY LIVING (ADL)
ADLS_ACUITY_SCORE: 35

## 2024-03-11 NOTE — H&P
Suzy Rodríguez  [id number]  female  38 year old      Reason for procedure/surgery: rectal bleeding    Patient Active Problem List   Diagnosis    Malignant neoplasm of cortex of right adrenal gland (H)    Luetscher's syndrome    Primary adrenal insufficiency (H)    Calculus of kidney    Carcinoma, adrenal cortical (H)    Cushing's syndrome (H24)    Generalized anxiety disorder    Hypertension due to endocrine disorder    MTHFR mutation    S/P gastric bypass    Allergic urticaria    Herpes simplex infection of genitourinary system    Adnexal cyst    Major depressive disorder, recurrent episode, moderate with anxious distress (H)    Hypothyroidism    Hypocalcemia    Attention deficit hyperactivity disorder, combined type, moderate       Past Surgical History:    Past Surgical History:   Procedure Laterality Date    ADRENALECTOMY Right 2018    Procedure: ADRENALECTOMY;  Right Adrenalectomy,  Embolize Liver , Anesthesia Block;  Surgeon: Warren Mansfield MD;  Location: [facility]    ADRENALECTOMY      ADRENALECTOMY Right      SECTION      twice     SECTION      2     SECTION      x2    EMBOLECTOMY ABDOMEN N/A 2018    Procedure: EMBOLECTOMY ABDOMEN;;  Surgeon: Ector Mcintyre MD;  Location: [facility]    ENDOSCOPIC ULTRASOUND UPPER GASTROINTESTINAL TRACT (GI) N/A 2023    Procedure: ENDOSCOPIC ULTRASOUND, ESOPHAGOSCOPY / UPPER GASTROINTESTINAL TRACT (GI), with cystgastrostomy stent placement and dilation;  Surgeon: Khoi Gomes MD;  Location: [facility]    ENDOSCOPIC ULTRASOUND UPPER GASTROINTESTINAL TRACT (GI) N/A 2023    Procedure: ENDOSCOPIC ULTRASOUND, UPPER GASTROINTESTINAL TRACT (GI), Esophagogastroduodenoscopy with biopsies;  Surgeon: Khoi Gomes MD;  Location: [facility]    ESOPHAGOSCOPY, GASTROSCOPY, DUODENOSCOPY (EGD), COMBINED N/A 2022    Procedure: ESOPHAGOGASTRODUODENOSCOPY,  NEEDLE  WITH ENDOSCOPIC ULTRASOUND GUIDANCE;  Surgeon: Mireya  Khoi Knox MD;  Location: [facility]    ESOPHAGOSCOPY, GASTROSCOPY, DUODENOSCOPY (EGD), COMBINED N/A 4/19/2023    Procedure: ESOPHAGOGASTRODUODENOSCOPY with stent removal;  Surgeon: Khoi Gomes MD;  Location: [facility]    EXTRACORPOREAL SHOCK WAVE LITHOTRIPSY (ESWL)      GASTRIC BYPASS  2016    VT CYSTO/URETERO W/LITHOTRIPSY &INDWELL STENT INSRT Left 5/11/2018    Procedure: CYSTOSCOPY, LEFT URETEROSCOPY LASER LITHOTRIPSY STENT INSERTION;  Surgeon: Skyler Delvalle MD;  Location: [facility];  Service: Urology    VT LAP GASTRIC BYPASS/DERICK-EN-Y N/A 6/8/2016    Procedure: GASTRIC BYPASS LAPAROSCOPIC DERICK-EN-Y;  Surgeon: Randy Sutherland MD;  Location: [facility];  Service: General    TUBAL LIGATION      WISDOM TOOTH EXTRACTION Bilateral        Past Medical History:   Past Medical History:   Diagnosis Date    Adrenal cortex cancer, right (H) 6/30/2018    Resected 6/25/18, Dr. Mansfield.    Adrenal mass (H24)     Anemia     thought due to heavy menses.    Calculus of ureter 5/9/2018    Carcinoma, adrenal cortical (H) 7/18/2018    Chocolate cyst of ovary 2011    Clotting disorder (H24)     Depression     GERD (gastroesophageal reflux disease)     History of miscarriage     Hypertension due to endocrine disorder 6/18/2018    Kidney stones     passed on their own    Malignant neoplasm of cortex of right adrenal gland (H) 6/25/2018    EOTD; 4/29/19.  Check in May. SCP started.  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) June 25, 2018   Cushing's syndrome, secondary to #1 (300 mcg/24 hr); Rx hydrocortisone 20 + 10 post op   Post operative defect right hemidiaphragm with ?worsening right effussion, not present preop   Currently on mitotane, 500 mg, BID x 1 week + hydrocortisone    Menstrual disorder     menorrhagia    MTHFR mutation     MTHFR mutation     believed to be homozygous for the mutation.    SHREYA on CPAP 12/17/2015    Stopped using CPAP the Fall  "of 2016 after weight loss as even at lower pressure/adjustment couldn't tolerate the cpap. Resting well, cautioned to watch for any signs of fatigue and consider \"titration study\" in the future to see if cpap is still required at her new weight.    Pneumonia     hx of recurrent pneumonia issues/respiratory infections.    Polycystic ovary syndrome     able to get pregnant, not on meds.    Pulmonary embolism (H) 2009 or so    briefly on wafarin.    Sleep apnea     cpap    Vitamin D deficiency        Social History:   Social History     Tobacco Use    Smoking status: Former    Smokeless tobacco: Never    Tobacco comments:     vapes marijuana   Substance Use Topics    Alcohol use: Yes     Comment: occ.       Family History:   Family History   Problem Relation Age of Onset    Depression Paternal Grandmother     Diabetes Mother     Diabetes Father     Hypertension Father     Chronic Obstructive Pulmonary Disease Father     Cancer Maternal Grandmother         gastric    Urolithiasis Maternal Grandmother     Heart Disease Maternal Grandfather     Cancer Maternal Grandfather         lung    Heart Disease Paternal Grandfather     Breast Cancer No family hx of     Colon Cancer No family hx of     Prostate Cancer No family hx of     Cerebrovascular Disease No family hx of     Clotting Disorder No family hx of     Gout No family hx of        Allergies:   Allergies   Allergen Reactions    Bee Venom Swelling    Ibuprofen Hives and Swelling       Active Medications:   No current outpatient medications on file.       Systemic Review:   CONSTITUTIONAL: NEGATIVE for fever, chills, change in weight  ENT/MOUTH: NEGATIVE for ear, mouth and throat problems  RESP: NEGATIVE for significant cough or SOB  CV: NEGATIVE for chest pain, palpitations or peripheral edema    Physical Examination:   Vital Signs: Resp 16   Ht 1.626 m (5' 4\")   Wt 61.2 kg (135 lb)   BMI 23.17 kg/m    GENERAL: healthy, alert and no distress  NECK: no adenopathy, no " asymmetry, masses, or scars  RESP: lungs clear to auscultation - no rales, rhonchi or wheezes  CV: regular rate and rhythm, normal S1 S2, no S3 or S4, no murmur, click or rub, no peripheral edema and peripheral pulses strong  ABDOMEN: soft, nontender, no hepatosplenomegaly, no masses and bowel sounds normal  MS: no gross musculoskeletal defects noted, no edema    ASA: 2    Mallampati Score: 2      Plan: Appropriate to proceed as scheduled.      Robert Lugo MD  3/11/2024    PCP:  Ivette Leal

## 2024-03-17 NOTE — PROGRESS NOTES
[facility]  Hospitalist Group                                                                                          Hospitalist Progress Note  Maegan Trevino MD  Office Phone: [phone]        Date of Service:  3/17/2024  NAME:  Baldomero Reece  :  1958  MRN:  [id number]       Admission Summary:   Baldomero Reece is a 65 y.o. 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  Patient recalls drinking until he passed out. Last known was on 2024 before patient went to sleep.  Patient woke up around 3 AM and noticed left-sided weakness.  He recalls drinking more but is unclear on what he drank and how much.  Patient denies using illicit drugs but is open to trying anything.     At the ED patient arrived tachycardic with a heart rate of 116 and he medically stable.  Per ED exam patient had a NIHSS score of 20 which later went down to 13 after patient began moving his left arm.  Significant labs include lactic acid 2.4, alkaline phosphatase 123, WBC 4.0, RDW 16, platelet count 115, ethanol level 63.  Lactic acid down trended to 1.1 after patient was given 1 L of fluids.  UA negative for infection, UDS positive for cocaine and marijuana use.  CT head showed no acute pathology CTA showed no evidence of significant stenosis however did redemonstrate a small AV malformation in the left basal ganglia.  Teleneurologist recommended stroke evaluation including giving aspirin, Plavix which was initiated in the ED.     On presentation patient was eating his meal using both of his hands.  However once patient acknowledged writer's presence left arm drooped.  On physical exam patient showed left arm was unable to move however with encouragement patient  Suzy is a 36 year old who is being evaluated via a billable video visit.      Patient stated she is in the state of [place] for the visit today.    How would you like to obtain your AVS? MyChart  If the video visit is dropped, the invitation should be resent by: Text to cell phone: [phone]  Will anyone else be joining your video visit? No        Video-Visit Details     Originating Location (pt. Location): Home    Distant Location (provider location):  [facility]     Platform used for Video Visit: Milton Amezquita, Virtual Visit Facilitator     noted.      Admission Status:[id number]:::1}      Medications Reviewed:     Current Facility-Administered Medications   Medication Dose Route Frequency    enoxaparin (LOVENOX) injection 40 mg  40 mg SubCUTAneous Daily    traMADol (ULTRAM) tablet 50 mg  50 mg Oral Q8H PRN    aspirin tablet 325 mg  325 mg Oral Daily    clopidogrel (PLAVIX) tablet 75 mg  75 mg Oral Daily    amoxicillin-clavulanate (AUGMENTIN) 875-125 MG per tablet 1 tablet  1 tablet Oral 2 times per day    doxycycline hyclate (VIBRA-TABS) tablet 100 mg  100 mg Oral 2 times per day    sodium chloride flush 0.9 % injection 5-40 mL  5-40 mL IntraVENous 2 times per day    sodium chloride flush 0.9 % injection 5-40 mL  5-40 mL IntraVENous PRN    0.9 % sodium chloride infusion   IntraVENous PRN    ondansetron (ZOFRAN-ODT) disintegrating tablet 4 mg  4 mg Oral Q8H PRN    Or    ondansetron (ZOFRAN) injection 4 mg  4 mg IntraVENous Q6H PRN    polyethylene glycol (GLYCOLAX) packet 17 g  17 g Oral Daily PRN    rosuvastatin (CRESTOR) tablet 40 mg  40 mg Oral Nightly    thiamine tablet 100 mg  100 mg Oral Daily    multivitamin 1 tablet  1 tablet Oral Daily    folic acid (FOLVITE) tablet 1 mg  1 mg Oral Daily    nicotine (NICODERM CQ) 21 MG/24HR 1 patch  1 patch TransDERmal Daily    ipratropium 0.5 mg-albuterol 2.5 mg (DUONEB) nebulizer solution 1 Dose  1 Dose Inhalation Q4H PRN    naloxone (NARCAN) injection 0.4 mg  0.4 mg IntraVENous PRN       ______________________________________________________________________  EXPECTED LENGTH OF STAY: 11  ACTUAL LENGTH OF STAY:          9                 Maegan Trevino MD

## 2024-06-11 DIAGNOSIS — C74.01 MALIGNANT NEOPLASM OF CORTEX OF RIGHT ADRENAL GLAND (H): Primary | ICD-10-CM

## 2024-06-12 DIAGNOSIS — R74.8 ELEVATED ALKALINE PHOSPHATASE LEVEL: ICD-10-CM

## 2024-06-12 DIAGNOSIS — C7B.8 OTHER SECONDARY NEUROENDOCRINE TUMORS (H): ICD-10-CM

## 2024-06-12 DIAGNOSIS — C74.01 MALIGNANT NEOPLASM OF CORTEX OF RIGHT ADRENAL GLAND (H): Primary | ICD-10-CM

## 2024-06-12 DIAGNOSIS — G47.33 OSA (OBSTRUCTIVE SLEEP APNEA): ICD-10-CM

## 2024-06-12 DIAGNOSIS — F41.9 ANXIETY: ICD-10-CM

## 2024-06-25 ENCOUNTER — ANCILLARY PROCEDURE (OUTPATIENT)
Dept: CT IMAGING | Facility: CLINIC | Age: 39
End: 2024-06-25
Attending: INTERNAL MEDICINE
Payer: COMMERCIAL

## 2024-06-25 ENCOUNTER — LAB (OUTPATIENT)
Dept: LAB | Facility: CLINIC | Age: 39
End: 2024-06-25
Payer: COMMERCIAL

## 2024-06-25 DIAGNOSIS — F41.9 ANXIETY: ICD-10-CM

## 2024-06-25 DIAGNOSIS — C74.01 MALIGNANT NEOPLASM OF CORTEX OF RIGHT ADRENAL GLAND (H): ICD-10-CM

## 2024-06-25 DIAGNOSIS — G47.33 OSA (OBSTRUCTIVE SLEEP APNEA): ICD-10-CM

## 2024-06-25 DIAGNOSIS — C7B.8 OTHER SECONDARY NEUROENDOCRINE TUMORS (H): ICD-10-CM

## 2024-06-25 DIAGNOSIS — R74.8 ELEVATED ALKALINE PHOSPHATASE LEVEL: ICD-10-CM

## 2024-06-25 LAB
ALBUMIN SERPL BCG-MCNC: 4.6 G/DL (ref 3.5–5.2)
ALP SERPL-CCNC: 100 U/L (ref 40–150)
ALT SERPL W P-5'-P-CCNC: 19 U/L (ref 0–50)
ANION GAP SERPL CALCULATED.3IONS-SCNC: 10 MMOL/L (ref 7–15)
AST SERPL W P-5'-P-CCNC: 22 U/L (ref 0–45)
BASOPHILS # BLD AUTO: 0 10E3/UL (ref 0–0.2)
BASOPHILS NFR BLD AUTO: 1 %
BILIRUB SERPL-MCNC: 0.4 MG/DL
BUN SERPL-MCNC: 8.4 MG/DL (ref 6–20)
CALCIUM SERPL-MCNC: 9.6 MG/DL (ref 8.6–10)
CHLORIDE SERPL-SCNC: 102 MMOL/L (ref 98–107)
CHOLEST SERPL-MCNC: 178 MG/DL
CREAT SERPL-MCNC: 0.72 MG/DL (ref 0.51–0.95)
DEPRECATED HCO3 PLAS-SCNC: 27 MMOL/L (ref 22–29)
EGFRCR SERPLBLD CKD-EPI 2021: >90 ML/MIN/1.73M2
EOSINOPHIL # BLD AUTO: 0.1 10E3/UL (ref 0–0.7)
EOSINOPHIL NFR BLD AUTO: 2 %
ERYTHROCYTE [DISTWIDTH] IN BLOOD BY AUTOMATED COUNT: 12.7 % (ref 10–15)
FASTING STATUS PATIENT QL REPORTED: NO
FASTING STATUS PATIENT QL REPORTED: NO
GLUCOSE SERPL-MCNC: 75 MG/DL (ref 70–99)
HCT VFR BLD AUTO: 45.1 % (ref 35–47)
HDLC SERPL-MCNC: 101 MG/DL
HGB BLD-MCNC: 15.5 G/DL (ref 11.7–15.7)
IMM GRANULOCYTES # BLD: 0 10E3/UL
IMM GRANULOCYTES NFR BLD: 0 %
LDH SERPL L TO P-CCNC: 166 U/L (ref 0–250)
LDLC SERPL CALC-MCNC: 65 MG/DL
LYMPHOCYTES # BLD AUTO: 1.5 10E3/UL (ref 0.8–5.3)
LYMPHOCYTES NFR BLD AUTO: 27 %
MCH RBC QN AUTO: 31.6 PG (ref 26.5–33)
MCHC RBC AUTO-ENTMCNC: 34.4 G/DL (ref 31.5–36.5)
MCV RBC AUTO: 92 FL (ref 78–100)
MONOCYTES # BLD AUTO: 0.4 10E3/UL (ref 0–1.3)
MONOCYTES NFR BLD AUTO: 7 %
NEUTROPHILS # BLD AUTO: 3.5 10E3/UL (ref 1.6–8.3)
NEUTROPHILS NFR BLD AUTO: 63 %
NONHDLC SERPL-MCNC: 77 MG/DL
NRBC # BLD AUTO: 0 10E3/UL
NRBC BLD AUTO-RTO: 0 /100
PLATELET # BLD AUTO: 214 10E3/UL (ref 150–450)
POTASSIUM SERPL-SCNC: 4.3 MMOL/L (ref 3.4–5.3)
PROT SERPL-MCNC: 6.8 G/DL (ref 6.4–8.3)
RBC # BLD AUTO: 4.91 10E6/UL (ref 3.8–5.2)
SODIUM SERPL-SCNC: 139 MMOL/L (ref 135–145)
TRIGL SERPL-MCNC: 61 MG/DL
TSH SERPL DL<=0.005 MIU/L-ACNC: 2.35 UIU/ML (ref 0.3–4.2)
WBC # BLD AUTO: 5.4 10E3/UL (ref 4–11)

## 2024-06-25 PROCEDURE — 99000 SPECIMEN HANDLING OFFICE-LAB: CPT | Performed by: PATHOLOGY

## 2024-06-25 PROCEDURE — 85025 COMPLETE CBC W/AUTO DIFF WBC: CPT | Performed by: PATHOLOGY

## 2024-06-25 PROCEDURE — 80061 LIPID PANEL: CPT | Performed by: PATHOLOGY

## 2024-06-25 PROCEDURE — 82627 DEHYDROEPIANDROSTERONE: CPT | Performed by: INTERNAL MEDICINE

## 2024-06-25 PROCEDURE — 84443 ASSAY THYROID STIM HORMONE: CPT | Performed by: PATHOLOGY

## 2024-06-25 PROCEDURE — 36415 COLL VENOUS BLD VENIPUNCTURE: CPT | Performed by: PATHOLOGY

## 2024-06-25 PROCEDURE — 83615 LACTATE (LD) (LDH) ENZYME: CPT | Performed by: PATHOLOGY

## 2024-06-25 PROCEDURE — 74177 CT ABD & PELVIS W/CONTRAST: CPT | Performed by: RADIOLOGY

## 2024-06-25 PROCEDURE — 86316 IMMUNOASSAY TUMOR OTHER: CPT | Mod: 90 | Performed by: PATHOLOGY

## 2024-06-25 PROCEDURE — 82024 ASSAY OF ACTH: CPT | Performed by: INTERNAL MEDICINE

## 2024-06-25 PROCEDURE — 71260 CT THORAX DX C+: CPT | Performed by: RADIOLOGY

## 2024-06-25 PROCEDURE — 80053 COMPREHEN METABOLIC PANEL: CPT | Performed by: PATHOLOGY

## 2024-06-25 RX ORDER — IOPAMIDOL 755 MG/ML
76 INJECTION, SOLUTION INTRAVASCULAR ONCE
Status: COMPLETED | OUTPATIENT
Start: 2024-06-25 | End: 2024-06-25

## 2024-06-25 RX ADMIN — IOPAMIDOL 76 ML: 755 INJECTION, SOLUTION INTRAVASCULAR at 16:26

## 2024-06-25 NOTE — DISCHARGE INSTRUCTIONS

## 2024-06-26 ENCOUNTER — VIRTUAL VISIT (OUTPATIENT)
Dept: ONCOLOGY | Facility: CLINIC | Age: 39
End: 2024-06-26
Attending: INTERNAL MEDICINE
Payer: COMMERCIAL

## 2024-06-26 VITALS — WEIGHT: 140 LBS | HEIGHT: 65 IN | BODY MASS INDEX: 23.32 KG/M2

## 2024-06-26 DIAGNOSIS — C74.01 MALIGNANT NEOPLASM OF CORTEX OF RIGHT ADRENAL GLAND (H): Primary | ICD-10-CM

## 2024-06-26 DIAGNOSIS — G47.33 OSA (OBSTRUCTIVE SLEEP APNEA): ICD-10-CM

## 2024-06-26 LAB
ACTH PLAS-MCNC: 11 PG/ML
DHEA-S SERPL-MCNC: <15 UG/DL (ref 35–430)

## 2024-06-26 ASSESSMENT — PAIN SCALES - GENERAL: PAINLEVEL: NO PAIN (0)

## 2024-06-26 NOTE — PROGRESS NOTES
Virtual Visit Details    Type of service:  Video Visit        Originating Location (pt. Location): Home    Distant Location (provider location):  Off-site  Platform used for Video Visit: Milton

## 2024-06-26 NOTE — LETTER
"6/26/2024      Suzy Rodríguez  [address]  [facility] [address]      Dear Colleague,    Thank you for referring your patient, Suzy Rodríguez, to the [facility]. Please see a copy of my visit note below.    Virtual Visit Details    Type of service:  Video Visit        Originating Location (pt. Location): Home    Distant Location (provider location):  Off-site  Platform used for Video Visit: [facility] Oncology Followup  Jun 26, 2024     REFERRING PROVIDER: Warren Mansfield MD from [facility] Urologic Oncology clinic.      REASON FOR CURRENT VISIT: Follow-up for adrenocortical carcinoma,    ONCOLOGIC HISTORY:  1. 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   - CT abdomen and pelvis stone protocol without contrast 5 2836 showed \"9.2 x 8 cm heterogeneous right upper quadrant mass with small foci of calcification within it which is likely arising from the adrenal gland though inseparable from liver and upper pole of right kidney. It is incompletely evaluated on this noncontrast study. 3 x 2.6 cm mass right lobe of liver on image #85 also incompletely evaluated. 6 x 4 x 4 mm upper third left ureteral obstructing stone with mild to moderate secondary hydronephrosis. Collection of stones at lower pole left kidney extending over an area of approximately 6 x 7 x 4 mm. Additional nonobstructing 1 mm stone upper pole left kidney. 2 mm nonobstructing stone noted upper pole right kidney with no hydronephrosis on the right. Postop change consistent with gastric bypass. Noncontrast images of spleen, left adrenal gland, gallbladder, and pancreas unremarkable. No aneurysm. No adenopathy.\"  - Seen by Dr. Delvalle at [facility] Urology clinic. Referred to Dr. Alvino Patel and then Dr. Warren Mansfield.  - " Endocrinology team directed workup showed high DHEAS level of 740 pre-surgery.   - Right open adrenalectomy and hepatic mobilization performed by Dr. Warren Mansfield on 6/25/2018. Pathology showed adrenocortical carcinoma measuring 11.3 cm, weighing 413 g with extension into or through the adrenal capsule negative lymphovascular invasion, tumor necrosis present, margins involved by tumor, no regional lymph nodes identified, pathologic stage T2 NX.  pathology review reveals low grade ACC.  - DHEAS normalized postsurgery.   - Adjuvant Mitotane started on 7/24/18. Dose increased to 2000mg TID around 10/23/18 due to persistently low troughs. Held 1/16/19 for two weeks and resumed 1/30 at 1000 mg po BID due to very poor tolerance of higher doses. Highest mitotane level was 10.2 on 2/11/19. Mitotane troughs did not rise above 10 despite increase in dose to 1500 BID and then 1500+2000. Started 2000mg BID on 10/28/20. Increased to [phone] since around Christmas 2020.   - Saw radiation oncology at [facility], radiation oncology at [facility], as well as endocrine oncology (Dr. Huertas) at [facility].   - S/p adjuvant RT by Dr. Monsivais - 5040 cGy over 30 fr (8/24/18-10/6/18).  - 2/11/19: OSH CT C/A/P and MRI brain with contrast - no evidence of disease recurrence.   - 06/08/20, 09/14/20, 12/7/20, 3/16/2021: CT C/A/P with contrast - AFUA.    INTERVAL HISTORY:  Ms. Rodríguez is a 38 year old woman with history notable for right-sided functioning adrenocortical carcinoma (diagnosed in May 2018), who was  on adjuvant mitotane until May 2021.     She is being followed over a video visit. I joined late for the visit and she had dropped off.     REVIEW OF SYSTEMS: 14 point ROS negative other than the symptoms noted above in the HPI.    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 Mansfield.     Adrenal  "mass (H24)      Anemia     thought due to heavy menses.     Calculus of ureter 2018     Carcinoma, adrenal cortical (H) 2018     Chocolate cyst of ovary      Clotting disorder (H24)      Depression      GERD (gastroesophageal reflux disease)      History of miscarriage      Hypertension due to endocrine disorder 2018     Kidney stones     passed on their own     Malignant neoplasm of cortex of right adrenal gland (H) 2018    EOTD; 18.  Check in May. SCP started.  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     SHREYA on CPAP 2015    Stopped using CPAP the  of  after weight loss as even at lower pressure/adjustment couldn't tolerate the cpap. Resting well, cautioned to watch for any signs of fatigue and consider \"titration study\" in the future to see if cpap is still required at her new weight.     Pneumonia     hx of recurrent pneumonia issues/respiratory infections.     Polycystic ovary syndrome     able to get pregnant, not on meds.     Pulmonary embolism (H)  or so    briefly on wafarin.     Sleep apnea     cpap     Vitamin D deficiency    MHTFR mutation with h/o mutiple miscarriages.    PAST SURGICAL HISTORY:   Past Surgical History:   Procedure Laterality Date     ADRENALECTOMY Right 2018    Procedure: ADRENALECTOMY;  Right Adrenalectomy,  Embolize Liver , Anesthesia Block;  Surgeon: Warren Mansfield MD;  Location: [facility]     ADRENALECTOMY       ADRENALECTOMY Right       SECTION      twice      SECTION      2 "      SECTION      x2     COLONOSCOPY N/A 3/11/2024    Procedure: Colonoscopy;  Surgeon: Robert Lugo MD;  Location: [facility]     EMBOLECTOMY ABDOMEN N/A 2018    Procedure: EMBOLECTOMY ABDOMEN;;  Surgeon: Ector Mcintyre MD;  Location: [facility]     ENDOSCOPIC ULTRASOUND UPPER GASTROINTESTINAL TRACT (GI) N/A 2023    Procedure: ENDOSCOPIC ULTRASOUND, ESOPHAGOSCOPY / UPPER GASTROINTESTINAL TRACT (GI), with cystgastrostomy stent placement and dilation;  Surgeon: Khoi Gomes MD;  Location: [facility]     ENDOSCOPIC ULTRASOUND UPPER GASTROINTESTINAL TRACT (GI) N/A 2023    Procedure: ENDOSCOPIC ULTRASOUND, UPPER GASTROINTESTINAL TRACT (GI), Esophagogastroduodenoscopy with biopsies;  Surgeon: Khoi Gomes MD;  Location: [facility]     ESOPHAGOSCOPY, GASTROSCOPY, DUODENOSCOPY (EGD), COMBINED N/A 2022    Procedure: ESOPHAGOGASTRODUODENOSCOPY,  NEEDLE  WITH ENDOSCOPIC ULTRASOUND GUIDANCE;  Surgeon: Khoi Gomes MD;  Location: [facility]     ESOPHAGOSCOPY, GASTROSCOPY, DUODENOSCOPY (EGD), COMBINED N/A 2023    Procedure: ESOPHAGOGASTRODUODENOSCOPY with stent removal;  Surgeon: Khoi Gomes MD;  Location: [facility]     EXTRACORPOREAL SHOCK WAVE LITHOTRIPSY (ESWL)       GASTRIC BYPASS       NC CYSTO/URETERO W/LITHOTRIPSY &INDWELL STENT INSRT Left 2018    Procedure: CYSTOSCOPY, LEFT URETEROSCOPY LASER LITHOTRIPSY STENT INSERTION;  Surgeon: Skyler Delvalle MD;  Location: [facility];  Service: Urology     NC LAP GASTRIC BYPASS/DERICK-EN-Y N/A 2016    Procedure: GASTRIC BYPASS LAPAROSCOPIC DERICK-EN-Y;  Surgeon: Randy Sutherland MD;  Location: [facility];  Service: General     TUBAL LIGATION       WISDOM TOOTH EXTRACTION Bilateral       SOCIAL HISTORY:   Social History     Tobacco Use     Smoking status: Former     Smokeless tobacco: Never     Tobacco comments:     vapes marijuana   Vaping Use     Vaping status: Never Used   Substance Use Topics      Alcohol use: Yes     Comment: occ.     Drug use: Not Currently     Types: Marijuana     FAMILY HISTORY:   Family History   Problem Relation Age of Onset     Depression Paternal Grandmother      Diabetes Mother      Diabetes Father      Hypertension Father      Chronic Obstructive Pulmonary Disease Father      Cancer Maternal Grandmother         gastric     Urolithiasis Maternal Grandmother      Heart Disease Maternal Grandfather      Cancer Maternal Grandfather         lung     Heart Disease Paternal Grandfather      Breast Cancer No family hx of      Colon Cancer No family hx of      Prostate Cancer No family hx of      Cerebrovascular Disease No family hx of      Clotting Disorder No family hx of      Gout No family hx of      ALLERGIES:   Allergies   Allergen Reactions     Bee Venom Swelling     Ibuprofen Hives and Swelling     CURRENT MEDICATIONS:   Current Outpatient Medications   Medication Instructions     acetaminophen (TYLENOL) 650 mg, Oral, EVERY 4 HOURS PRN     buPROPion (WELLBUTRIN SR) 150 mg, Oral     calcitRIOL (ROCALTROL) 0.5 MCG capsule No dose, route, or frequency recorded.     calcium carbonate 500 mg (elemental) (OSCAL) 500 mg, Oral, 2 TIMES DAILY     clonazePAM (KLONOPIN) 0.5 mg, Oral, 2 TIMES DAILY PRN     diazepam (VALIUM) 5 mg, Oral, ONCE PRN     fludrocortisone (FLORINEF) 0.2 mg, Oral, DAILY     FLUoxetine (PROZAC) 60 mg, Oral, DAILY     gabapentin (NEURONTIN) 900 mg, Oral, 3 TIMES DAILY     hydrocortisone (CORTEF) 10 MG tablet Take 3 tablets (30 MG) every morning and take 2 tablets (20 MG) by mouth every day at 2 PM. Additional as directed.     levothyroxine (SYNTHROID/LEVOTHROID) 125 mcg, Oral, DAILY     ondansetron (ZOFRAN) 8 mg, Oral, EVERY 8 HOURS PRN     prochlorperazine (COMPAZINE) 5 mg, Oral, EVERY 8 HOURS PRN     temazepam (RESTORIL) 15 mg, Oral, AT BEDTIME PRN     UNABLE TO FIND Other, medical cannabis (Patient's own supply. Not a prescription)       PHYSICAL EXAMINATION:  Vital  "signs: Ht 1.651 m (5' 5\")   Wt 63.5 kg (140 lb)   BMI 23.30 kg/m     Gen: NAD, on video no distress  No other exam    Recent Labs   Lab Test 06/25/24  1549 07/19/23  1047 03/24/23  0747 10/25/22  1003 09/29/22  0758    140 141 139 141  141   POTASSIUM 4.3 4.2 4.1 4.2 4.2  4.3   CHLORIDE 102 104 105 104 104  104   CO2 27 27 24 27 27  27   ANIONGAP 10 9 12 8 10  10   BUN 8.4 9.6 12.0 9.1 9.2  9.6   CR 0.72 0.85 0.86 0.75 0.82  0.80   GLC 75 90 96 91 91  92   SHASHA 9.6 9.7 9.3 9.1 8.4*  8.4*     No results for input(s): \"MAG\", \"PHOS\" in the last 06632 hours.  Recent Labs   Lab Test 06/25/24  1549 07/19/23  1047 03/24/23  0747 11/21/22  1527 11/07/22  1326 10/25/22  1003 09/29/22  0758   WBC 5.4 4.4 7.2 5.3 5.0 3.5* 3.7*   HGB 15.5 13.8 13.2 12.1 12.4 12.5 11.7    159 197 277 257 253 255   MCV 92 92 84 85 85 85 86   NEUTROPHIL 63 60  --   --  60 54 56     Recent Labs   Lab Test 06/25/24  1549 11/22/23  1142 07/19/23  1047   BILITOTAL 0.4 0.3 0.3   ALKPHOS 100 95 114*   ALT 19 22 19   AST 22 21 21   ALBUMIN 4.6 4.5 4.2     --   --      TSH   Date Value Ref Range Status   06/25/2024 2.35 0.30 - 4.20 uIU/mL Final   11/22/2023 2.27 0.30 - 4.20 uIU/mL Final   07/19/2023 1.95 0.30 - 4.20 uIU/mL Final   05/12/2022 0.16 (L) 0.40 - 4.00 mU/L Final   03/24/2022 0.08 (L) 0.40 - 4.00 mU/L Final   02/08/2022 0.29 (L) 0.40 - 4.00 mU/L Final   07/05/2021 0.22 (L) 0.40 - 4.00 mU/L Final   06/04/2021 0.44 0.40 - 4.00 mU/L Final   05/10/2021 0.18 (L) 0.40 - 4.00 mU/L Final     No results for input(s): \"CEA\" in the last 45123 hours.  Results for orders placed or performed in visit on 06/25/24   CT Chest/Abdomen/Pelvis w Contrast    Narrative    EXAMINATION: CT CHEST/ABDOMEN/PELVIS W CONTRAST, 6/25/2024 4:46 PM    TECHNIQUE: Helical CT images from the thoracic inlet through the  symphysis pubis were obtained with intravenous contrast. Contrast  dose: Isovue 370 76cc    COMPARISON: 5/12/2023 PET/CT    HISTORY: " Malignant neoplasm of cortex of right adrenal gland (H)    FINDINGS:    Chest: No pleural effusion or pneumothorax. No focal consolidation. No  suspicious pulmonary nodule. The central tracheobronchial tree is  clear. No bronchial wall thickening or bronchiectasis.    Normal heart size. No pericardial effusion. Normal caliber ascending  aorta and main pulmonary artery. No central pulmonary embolism. No  thoracic lymphadenopathy.    Abdomen and pelvis: Stable hemangioma with peripheral nodular  enhancement at the junction of hepatic segments 5 and 6. No new focal  suspicious hepatic lesion. Normal gallbladder. No intra or  extrahepatic biliary dilation. Normal pancreas, spleen, and left  adrenal gland. Surgical changes of right adrenalectomy. No suspicious  soft tissue abnormality in the surgical bed. Cortical atrophy of the  upper pole of the right kidney. No suspicious renal cortical mass. No  hydronephrosis, hydroureter, or urinary tract stone. Normal bladder  and uterus. Well-positioned intrauterine device. Left corpus luteum as  well as a thin-walled fluid attenuation left ovarian cyst measuring up  to 3.1 cm, previously 2.7 cm. No free fluid or free air. No bowel  obstruction or inflammation. Colonic diverticulosis. Normal appendix.  Surgical changes of Tanika-en-Y gastric bypass. Stable 10 mm polypoid  focus in the duodenal bulb (series 6 image 313). The major abdominal  vasculature is patent. No abdominal or pelvic lymphadenopathy. Stable  soft tissue nodule with eccentric coarse calcification in the pelvis  adjacent to the rectosigmoid colon without suspicious FDG avidity on  comparison PET CT.    Bones and soft tissues: No acute or aggressive osseous abnormality.      Impression    IMPRESSION:   1. No evidence of recurrent/metastatic disease in the chest, abdomen,  or pelvis.  2. Slightly increased left ovarian cyst measuring up to 3.1 cm.  Recommend attention on follow-up.   3. Other chronic and incidental  findings as detailed in the body of  the report.    MARNIE HALL BERNARDINO,          SYSTEM ID:  [id number]     *Note: Due to a large number of results and/or encounters for the requested time period, some results have not been displayed. A complete set of results can be found in Results Review.        ASSESSMENT AND PLAN: A 38 year old  female with right-sided functioning high-risk adrenocortical carcinoma.     Adrenocortical carcinoma:  - Started on adjuvant mitotane in July 2018 based on her presentation and tumor characteristics including invasion of the adrenal capsule, high mitotic rate, possibly positive margins, and high Ki67, which could result in a rate of recurrence as high as 40%.    The mitotane was stopped by Dr. Liang and endocrinologic oncologist from [facility] in May 2021.        I have reviewed all of the labs done prior to this clinic visit.  Labs are all completely normal including electrolytes, renal function, hepatic panel, complete blood count and differential. Her tumor markers are within normal limits. The scans do not show evidence of recurrent disease.     I joined late as she was booked outside of clinic timing. She had dropped off. I called her over 15 times over the next several hrs and could not reach her.     She follows at [facility]. She is over 6 yrs from her surgery and 3 yrs from discontinuing her mitotane.      Video-Visit Details    Type of service:  Video Visit  Originating Location (pt. Location): Home  Distant Location (provider location):  [facility]   Platform used for Video Visit: Ryan    No charge visit      Tru Hernadez    Hematologist and Medical Oncologist  [facility]        Again, thank you for allowing me to participate in the care of your patient.        Sincerely,        Tru Hernadez MD

## 2024-06-26 NOTE — NURSING NOTE
Is the patient currently in the state of [place]? YES    Visit mode:VIDEO    If the visit is dropped, the patient can be reconnected by: VIDEO VISIT: Text to cell phone:   Telephone Information:   Mobile [phone]       Will anyone else be joining the visit? NO  (If patient encounters technical issues they should call [phone]    How would you like to obtain your AVS? MyChart    Are changes needed to the allergy or medication list? No    Are refills needed on medications prescribed by this physician? NO    Reason for visit: CHASITY GAYLE

## 2024-06-26 NOTE — PROGRESS NOTES
"  [facility] Oncology Followup  Jun 26, 2024     REFERRING PROVIDER: Warren Mansfield MD from [facility] Urologic Oncology clinic.      REASON FOR CURRENT VISIT: Follow-up for adrenocortical carcinoma,    ONCOLOGIC HISTORY:  1. 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   - CT abdomen and pelvis stone protocol without contrast 5 [id number] showed \"9.2 x 8 cm heterogeneous right upper quadrant mass with small foci of calcification within it which is likely arising from the adrenal gland though inseparable from liver and upper pole of right kidney. It is incompletely evaluated on this noncontrast study. 3 x 2.6 cm mass right lobe of liver on image #85 also incompletely evaluated. 6 x 4 x 4 mm upper third left ureteral obstructing stone with mild to moderate secondary hydronephrosis. Collection of stones at lower pole left kidney extending over an area of approximately 6 x 7 x 4 mm. Additional nonobstructing 1 mm stone upper pole left kidney. 2 mm nonobstructing stone noted upper pole right kidney with no hydronephrosis on the right. Postop change consistent with gastric bypass. Noncontrast images of spleen, left adrenal gland, gallbladder, and pancreas unremarkable. No aneurysm. No adenopathy.\"  - Seen by Dr. Delvalle at [facility] Urology clinic. Referred to Dr. Alvino Patel and then Dr. Warren Mansfield.  - Endocrinology team directed workup showed high DHEAS level of 740 pre-surgery.   - Right open adrenalectomy and hepatic mobilization performed by Dr. Warren Mansfield on 6/25/2018. Pathology showed adrenocortical carcinoma measuring 11.3 cm, weighing 413 g with extension into or through the adrenal capsule negative lymphovascular invasion, tumor necrosis present, margins involved by tumor, no regional lymph nodes identified, " pathologic stage T2 NX.  pathology review reveals low grade ACC.  - DHEAS normalized postsurgery.   - Adjuvant Mitotane started on 7/24/18. Dose increased to 2000mg TID around 10/23/18 due to persistently low troughs. Held 1/16/19 for two weeks and resumed 1/30 at 1000 mg po BID due to very poor tolerance of higher doses. Highest mitotane level was 10.2 on 2/11/19. Mitotane troughs did not rise above 10 despite increase in dose to 1500 BID and then 1500+2000. Started 2000mg BID on 10/28/20. Increased to [id number] since around Christmas 2020.   - Saw radiation oncology at [facility], radiation oncology at [facility], as well as endocrine oncology (Dr. Huertas) at [facility].   - S/p adjuvant RT by Dr. Monsivais - 5040 cGy over 30 fr (8/24/18-10/6/18).  - 2/11/19: OSH CT C/A/P and MRI brain with contrast - no evidence of disease recurrence.   - 06/08/20, 09/14/20, 12/7/20, 3/16/2021: CT C/A/P with contrast - AFUA.    INTERVAL HISTORY:  Ms. Rodríguez is a 38 year old woman with history notable for right-sided functioning adrenocortical carcinoma (diagnosed in May 2018), who was  on adjuvant mitotane until May 2021.     She is being followed over a video visit. I joined late for the visit and she had dropped off.     REVIEW OF SYSTEMS: 14 point ROS negative other than the symptoms noted above in the HPI.    PAST MEDICAL HISTORY:  Past Medical History:   Diagnosis Date    Adrenal cortex cancer, right (H) 6/30/2018    Resected 6/25/18, Dr. Mansfield.    Adrenal mass (H24)     Anemia     thought due to heavy menses.    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  Check in May. SCP started.  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    SHREYA on CPAP 2015    Stopped using CPAP the  after weight loss as even at lower pressure/adjustment couldn't tolerate the cpap. Resting well, cautioned to watch for any signs of fatigue and consider \"titration study\" in the future to see if cpap is still required at her new weight.    Pneumonia     hx of recurrent pneumonia issues/respiratory infections.    Polycystic ovary syndrome     able to get pregnant, not on meds.    Pulmonary embolism (H)  or so    briefly on wafarin.    Sleep apnea     cpap    Vitamin D deficiency    MHTFR mutation with h/o mutiple miscarriages.    PAST SURGICAL HISTORY:   Past Surgical History:   Procedure Laterality Date    ADRENALECTOMY Right 2018    Procedure: ADRENALECTOMY;  Right Adrenalectomy,  Embolize Liver , Anesthesia Block;  Surgeon: Warren Mansfield MD;  Location: [facility]    ADRENALECTOMY      ADRENALECTOMY Right      SECTION      twice     SECTION      2     SECTION      x2    COLONOSCOPY N/A 3/11/2024    Procedure: Colonoscopy;  Surgeon: Robert Lugo MD;  Location: [facility]    EMBOLECTOMY ABDOMEN N/A 2018    Procedure: EMBOLECTOMY ABDOMEN;;  Surgeon: Ector Mcintyre MD;  Location: [facility] OR    ENDOSCOPIC ULTRASOUND UPPER GASTROINTESTINAL TRACT (GI) N/A 2023    Procedure: ENDOSCOPIC ULTRASOUND, ESOPHAGOSCOPY / UPPER GASTROINTESTINAL TRACT (GI), with cystgastrostomy stent placement " and dilation;  Surgeon: Khoi Gomes MD;  Location: [facility] OR    ENDOSCOPIC ULTRASOUND UPPER GASTROINTESTINAL TRACT (GI) N/A 2/8/2023    Procedure: ENDOSCOPIC ULTRASOUND, UPPER GASTROINTESTINAL TRACT (GI), Esophagogastroduodenoscopy with biopsies;  Surgeon: Khoi Gomes MD;  Location: [facility] OR    ESOPHAGOSCOPY, GASTROSCOPY, DUODENOSCOPY (EGD), COMBINED N/A 12/13/2022    Procedure: ESOPHAGOGASTRODUODENOSCOPY,  NEEDLE  WITH ENDOSCOPIC ULTRASOUND GUIDANCE;  Surgeon: Khoi Gomes MD;  Location: [facility] OR    ESOPHAGOSCOPY, GASTROSCOPY, DUODENOSCOPY (EGD), COMBINED N/A 4/19/2023    Procedure: ESOPHAGOGASTRODUODENOSCOPY with stent removal;  Surgeon: Khoi Gomes MD;  Location: [facility]    EXTRACORPOREAL SHOCK WAVE LITHOTRIPSY (ESWL)      GASTRIC BYPASS  2016    OK CYSTO/URETERO W/LITHOTRIPSY &INDWELL STENT INSRT Left 5/11/2018    Procedure: CYSTOSCOPY, LEFT URETEROSCOPY LASER LITHOTRIPSY STENT INSERTION;  Surgeon: Skyler Delvalle MD;  Location: [facility] OR;  Service: Urology    OK LAP GASTRIC BYPASS/DERICK-EN-Y N/A 6/8/2016    Procedure: GASTRIC BYPASS LAPAROSCOPIC DERICK-EN-Y;  Surgeon: Randy Sutherland MD;  Location: [facility] OR;  Service: General    TUBAL LIGATION      WISDOM TOOTH EXTRACTION Bilateral       SOCIAL HISTORY:   Social History     Tobacco Use    Smoking status: Former    Smokeless tobacco: Never    Tobacco comments:     vapes marijuana   Vaping Use    Vaping status: Never Used   Substance Use Topics    Alcohol use: Yes     Comment: occ.    Drug use: Not Currently     Types: Marijuana     FAMILY HISTORY:   Family History   Problem Relation Age of Onset    Depression Paternal Grandmother     Diabetes Mother     Diabetes Father     Hypertension Father     Chronic Obstructive Pulmonary Disease Father     Cancer Maternal Grandmother         gastric    Urolithiasis Maternal Grandmother     Heart Disease Maternal Grandfather     Cancer Maternal Grandfather         lung     "Heart Disease Paternal Grandfather     Breast Cancer No family hx of     Colon Cancer No family hx of     Prostate Cancer No family hx of     Cerebrovascular Disease No family hx of     Clotting Disorder No family hx of     Gout No family hx of      ALLERGIES:   Allergies   Allergen Reactions    Bee Venom Swelling    Ibuprofen Hives and Swelling     CURRENT MEDICATIONS:   Current Outpatient Medications   Medication Instructions    acetaminophen (TYLENOL) 650 mg, Oral, EVERY 4 HOURS PRN    buPROPion (WELLBUTRIN SR) 150 mg, Oral    calcitRIOL (ROCALTROL) 0.5 MCG capsule No dose, route, or frequency recorded.    calcium carbonate 500 mg (elemental) (OSCAL) 500 mg, Oral, 2 TIMES DAILY    clonazePAM (KLONOPIN) 0.5 mg, Oral, 2 TIMES DAILY PRN    diazepam (VALIUM) 5 mg, Oral, ONCE PRN    fludrocortisone (FLORINEF) 0.2 mg, Oral, DAILY    FLUoxetine (PROZAC) 60 mg, Oral, DAILY    gabapentin (NEURONTIN) 900 mg, Oral, 3 TIMES DAILY    hydrocortisone (CORTEF) 10 MG tablet Take 3 tablets (30 MG) every morning and take 2 tablets (20 MG) by mouth every day at 2 PM. Additional as directed.    levothyroxine (SYNTHROID/LEVOTHROID) 125 mcg, Oral, DAILY    ondansetron (ZOFRAN) 8 mg, Oral, EVERY 8 HOURS PRN    prochlorperazine (COMPAZINE) 5 mg, Oral, EVERY 8 HOURS PRN    temazepam (RESTORIL) 15 mg, Oral, AT BEDTIME PRN    UNABLE TO FIND Other, medical cannabis (Patient's own supply. Not a prescription)       PHYSICAL EXAMINATION:  Vital signs: Ht 1.651 m (5' 5\")   Wt 63.5 kg (140 lb)   BMI 23.30 kg/m     Gen: NAD, on video no distress  No other exam    Recent Labs   Lab Test 06/25/24  1549 07/19/23  1047 03/24/23  0747 10/25/22  1003 09/29/22  0758    140 141 139 141  141   POTASSIUM 4.3 4.2 4.1 4.2 4.2  4.3   CHLORIDE 102 104 105 104 104  104   CO2 27 27 24 27 27  27   ANIONGAP 10 9 12 8 10  10   BUN 8.4 9.6 12.0 9.1 9.2  9.6   CR 0.72 0.85 0.86 0.75 0.82  0.80   GLC 75 90 96 91 91  92   SHASHA 9.6 9.7 9.3 9.1 8.4*  8.4* " "    No results for input(s): \"MAG\", \"PHOS\" in the last 37828 hours.  Recent Labs   Lab Test 06/25/24  1549 07/19/23  1047 03/24/23  0747 11/21/22  1527 11/07/22  1326 10/25/22  1003 09/29/22  0758   WBC 5.4 4.4 7.2 5.3 5.0 3.5* 3.7*   HGB 15.5 13.8 13.2 12.1 12.4 12.5 11.7    159 197 277 257 253 255   MCV 92 92 84 85 85 85 86   NEUTROPHIL 63 60  --   --  60 54 56     Recent Labs   Lab Test 06/25/24  1549 11/22/23  1142 07/19/23  1047   BILITOTAL 0.4 0.3 0.3   ALKPHOS 100 95 114*   ALT 19 22 19   AST 22 21 21   ALBUMIN 4.6 4.5 4.2     --   --      TSH   Date Value Ref Range Status   06/25/2024 2.35 0.30 - 4.20 uIU/mL Final   11/22/2023 2.27 0.30 - 4.20 uIU/mL Final   07/19/2023 1.95 0.30 - 4.20 uIU/mL Final   05/12/2022 0.16 (L) 0.40 - 4.00 mU/L Final   03/24/2022 0.08 (L) 0.40 - 4.00 mU/L Final   02/08/2022 0.29 (L) 0.40 - 4.00 mU/L Final   07/05/2021 0.22 (L) 0.40 - 4.00 mU/L Final   06/04/2021 0.44 0.40 - 4.00 mU/L Final   05/10/2021 0.18 (L) 0.40 - 4.00 mU/L Final     No results for input(s): \"CEA\" in the last 88434 hours.  Results for orders placed or performed in visit on 06/25/24   CT Chest/Abdomen/Pelvis w Contrast    Narrative    EXAMINATION: CT CHEST/ABDOMEN/PELVIS W CONTRAST, 6/25/2024 4:46 PM    TECHNIQUE: Helical CT images from the thoracic inlet through the  symphysis pubis were obtained with intravenous contrast. Contrast  dose: Isovue 370 76cc    COMPARISON: 5/12/2023 PET/CT    HISTORY: Malignant neoplasm of cortex of right adrenal gland (H)    FINDINGS:    Chest: No pleural effusion or pneumothorax. No focal consolidation. No  suspicious pulmonary nodule. The central tracheobronchial tree is  clear. No bronchial wall thickening or bronchiectasis.    Normal heart size. No pericardial effusion. Normal caliber ascending  aorta and main pulmonary artery. No central pulmonary embolism. No  thoracic lymphadenopathy.    Abdomen and pelvis: Stable hemangioma with peripheral nodular  enhancement " at the junction of hepatic segments 5 and 6. No new focal  suspicious hepatic lesion. Normal gallbladder. No intra or  extrahepatic biliary dilation. Normal pancreas, spleen, and left  adrenal gland. Surgical changes of right adrenalectomy. No suspicious  soft tissue abnormality in the surgical bed. Cortical atrophy of the  upper pole of the right kidney. No suspicious renal cortical mass. No  hydronephrosis, hydroureter, or urinary tract stone. Normal bladder  and uterus. Well-positioned intrauterine device. Left corpus luteum as  well as a thin-walled fluid attenuation left ovarian cyst measuring up  to 3.1 cm, previously 2.7 cm. No free fluid or free air. No bowel  obstruction or inflammation. Colonic diverticulosis. Normal appendix.  Surgical changes of Tanika-en-Y gastric bypass. Stable 10 mm polypoid  focus in the duodenal bulb (series 6 image 313). The major abdominal  vasculature is patent. No abdominal or pelvic lymphadenopathy. Stable  soft tissue nodule with eccentric coarse calcification in the pelvis  adjacent to the rectosigmoid colon without suspicious FDG avidity on  comparison PET CT.    Bones and soft tissues: No acute or aggressive osseous abnormality.      Impression    IMPRESSION:   1. No evidence of recurrent/metastatic disease in the chest, abdomen,  or pelvis.  2. Slightly increased left ovarian cyst measuring up to 3.1 cm.  Recommend attention on follow-up.   3. Other chronic and incidental findings as detailed in the body of  the report.    MARNIE LEBRON DO         SYSTEM ID:  [id number]     *Note: Due to a large number of results and/or encounters for the requested time period, some results have not been displayed. A complete set of results can be found in Results Review.        ASSESSMENT AND PLAN: A 38 year old  female with right-sided functioning high-risk adrenocortical carcinoma.     Adrenocortical carcinoma:  - Started on adjuvant mitotane in July 2018 based on her presentation and  tumor characteristics including invasion of the adrenal capsule, high mitotic rate, possibly positive margins, and high Ki67, which could result in a rate of recurrence as high as 40%.    The mitotane was stopped by Dr. Liang and endocrinologic oncologist from [facility] in May 2021.        I have reviewed all of the labs done prior to this clinic visit.  Labs are all completely normal including electrolytes, renal function, hepatic panel, complete blood count and differential. Her tumor markers are within normal limits. The scans do not show evidence of recurrent disease.     I joined late as she was booked outside of clinic timing. She had dropped off. I called her over 15 times over the next several hrs and could not reach her.     She follows at [facility]. She is over 6 yrs from her surgery and 3 yrs from discontinuing her mitotane.      Video-Visit Details    Type of service:  Video Visit  Originating Location (pt. Location): Home  Distant Location (provider location):  [facility]   Platform used for Video Visit: Vuzix    No charge visit      Tru Hernadez    Hematologist and Medical Oncologist  [facility]

## 2024-06-27 LAB — CGA SERPL-MCNC: 40 NG/ML

## 2024-09-06 ENCOUNTER — MYC REFILL (OUTPATIENT)
Dept: ENDOCRINOLOGY | Facility: CLINIC | Age: 39
End: 2024-09-06
Payer: COMMERCIAL

## 2024-09-06 DIAGNOSIS — E03.8 SECONDARY HYPOTHYROIDISM: ICD-10-CM

## 2024-09-10 RX ORDER — LEVOTHYROXINE SODIUM 75 UG/1
75 TABLET ORAL DAILY
Qty: 90 TABLET | Refills: 0 | Status: SHIPPED | OUTPATIENT
Start: 2024-09-10

## 2024-11-11 ENCOUNTER — DOCUMENTATION ONLY (OUTPATIENT)
Dept: ENDOCRINOLOGY | Facility: CLINIC | Age: 39
End: 2024-11-11
Payer: COMMERCIAL

## 2024-11-11 NOTE — PROGRESS NOTES
Suzy Rodríguez has an upcoming lab appointment:    Future Appointments   Date Time Provider Department Center   11/12/2024  3:30 PM [facility]   11/13/2024 11:00 AM Veena Jaquez MD [facility]   11/14/2024  9:00 AM [id number]     There is no order available. Please review and place either future orders, as appropriate.     Joyce Mayers

## 2024-11-12 ENCOUNTER — LAB (OUTPATIENT)
Dept: LAB | Facility: CLINIC | Age: 39
End: 2024-11-12
Payer: COMMERCIAL

## 2024-11-12 DIAGNOSIS — R74.8 ELEVATED ALKALINE PHOSPHATASE LEVEL: ICD-10-CM

## 2024-11-12 DIAGNOSIS — E03.8 SECONDARY HYPOTHYROIDISM: ICD-10-CM

## 2024-11-12 DIAGNOSIS — F41.9 ANXIETY: ICD-10-CM

## 2024-11-12 DIAGNOSIS — Z98.84 GASTRIC BYPASS STATUS FOR OBESITY: ICD-10-CM

## 2024-11-12 DIAGNOSIS — C74.01 ADRENAL CORTEX CANCER, RIGHT (H): ICD-10-CM

## 2024-11-12 DIAGNOSIS — G47.33 OSA (OBSTRUCTIVE SLEEP APNEA): ICD-10-CM

## 2024-11-12 DIAGNOSIS — C74.01 MALIGNANT NEOPLASM OF CORTEX OF RIGHT ADRENAL GLAND (H): ICD-10-CM

## 2024-11-12 DIAGNOSIS — R73.09 OTHER ABNORMAL GLUCOSE: ICD-10-CM

## 2024-11-12 LAB
ALBUMIN SERPL BCG-MCNC: 4.4 G/DL (ref 3.5–5.2)
ALP SERPL-CCNC: 102 U/L (ref 40–150)
ALT SERPL W P-5'-P-CCNC: 21 U/L (ref 0–50)
ANION GAP SERPL CALCULATED.3IONS-SCNC: 7 MMOL/L (ref 7–15)
AST SERPL W P-5'-P-CCNC: 26 U/L (ref 0–45)
BILIRUB SERPL-MCNC: 0.3 MG/DL
BUN SERPL-MCNC: 10.5 MG/DL (ref 6–20)
CALCIUM SERPL-MCNC: 9.4 MG/DL (ref 8.8–10.4)
CHLORIDE SERPL-SCNC: 102 MMOL/L (ref 98–107)
CREAT SERPL-MCNC: 0.74 MG/DL (ref 0.51–0.95)
EGFRCR SERPLBLD CKD-EPI 2021: >90 ML/MIN/1.73M2
EST. AVERAGE GLUCOSE BLD GHB EST-MCNC: 97 MG/DL
GLUCOSE SERPL-MCNC: 83 MG/DL (ref 70–99)
HBA1C MFR BLD: 5 % (ref 0–5.6)
HCO3 SERPL-SCNC: 29 MMOL/L (ref 22–29)
POTASSIUM SERPL-SCNC: 4.5 MMOL/L (ref 3.4–5.3)
PROT SERPL-MCNC: 6.4 G/DL (ref 6.4–8.3)
SODIUM SERPL-SCNC: 138 MMOL/L (ref 135–145)
T4 FREE SERPL-MCNC: 1.3 NG/DL (ref 0.9–1.7)
TSH SERPL DL<=0.005 MIU/L-ACNC: 2.28 UIU/ML (ref 0.3–4.2)

## 2024-11-12 PROCEDURE — 80053 COMPREHEN METABOLIC PANEL: CPT

## 2024-11-12 PROCEDURE — 84439 ASSAY OF FREE THYROXINE: CPT

## 2024-11-12 PROCEDURE — 82627 DEHYDROEPIANDROSTERONE: CPT

## 2024-11-12 PROCEDURE — 83036 HEMOGLOBIN GLYCOSYLATED A1C: CPT

## 2024-11-12 PROCEDURE — 36415 COLL VENOUS BLD VENIPUNCTURE: CPT

## 2024-11-12 PROCEDURE — 84443 ASSAY THYROID STIM HORMONE: CPT

## 2024-11-13 ENCOUNTER — OFFICE VISIT (OUTPATIENT)
Dept: ENDOCRINOLOGY | Facility: CLINIC | Age: 39
End: 2024-11-13
Payer: COMMERCIAL

## 2024-11-13 VITALS
DIASTOLIC BLOOD PRESSURE: 84 MMHG | RESPIRATION RATE: 18 BRPM | BODY MASS INDEX: 25.46 KG/M2 | SYSTOLIC BLOOD PRESSURE: 124 MMHG | HEART RATE: 91 BPM | WEIGHT: 153 LBS | OXYGEN SATURATION: 98 %

## 2024-11-13 DIAGNOSIS — K91.2 HYPOGLYCEMIA AFTER GI (GASTROINTESTINAL) SURGERY: Primary | ICD-10-CM

## 2024-11-13 DIAGNOSIS — E03.8 SECONDARY HYPOTHYROIDISM: ICD-10-CM

## 2024-11-13 DIAGNOSIS — Z98.84 GASTRIC BYPASS STATUS FOR OBESITY: ICD-10-CM

## 2024-11-13 DIAGNOSIS — C74.01 ADRENAL CORTEX CANCER, RIGHT (H): ICD-10-CM

## 2024-11-13 LAB — DHEA-S SERPL-MCNC: <15 UG/DL (ref 35–430)

## 2024-11-13 RX ORDER — LEVOTHYROXINE SODIUM 75 UG/1
75 TABLET ORAL DAILY
Qty: 90 TABLET | Refills: 3 | Status: SHIPPED | OUTPATIENT
Start: 2024-11-13

## 2024-11-13 NOTE — LETTER
11/13/2024      Suzy Rodríguez  [address]  [facility] [address]      Dear Colleague,    Thank you for referring your patient, Suzy Rodríguez, to the [facility]. Please see a copy of my visit note below.    =========================================================================================    Assessment     1. 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 Ki-67 revealed an increased proliferation index of approximately 20%.   The tumor was functional and presented with high cortisol and DHEAS levels.  Post surgery/radiation and subtherapeutic treatment with mitotane (from July 2018 to May 2021). Hydrocortisone was discontinued in October 2022. The DHEAS has remained undetectable.  Screening for hypercortisolism last year was negative.  Clinically, the patient does not endorse signs or symptoms suggestive of cortisol excess.  Most recent imaging CT was negative.    2. Hypothyroidism   Clinically and biochemically, she is euthyroid.  I recommended to continue to take the same dose of levothyroxine.    3.  Status post bariatric surgery  Recommended a daily intake from food products and//supplements of 9959-9198 mg calcium.  She should continue to take 2000 units vitamin D daily, as prior vitamin D levels have been normal while taking this dose.    4.  Episodes of tremor, feeling of warmth, increased sweating and confusion, highly suggestive of hypoglycemia.  Counseled the patient on hypoglycemic symptoms, the correction of hypoglycemia by using 15 g of easily absorbable carbohydrates, and the main treatment which consists in dietary changes to reduce the amount of easily absorbable carbohydrates and incorporate fat and proteins with her meals and snacks.  Prescription placed for a glucometer and test strips.    Patient Instructions   Post -Gastric Bypass Anti-Hypoglycemia Diet    Low  Carbohydrate Foods  Which are unlikely to produce Hypoglycemia  (Eat these foods)    Beef, Pork, Parrish, Sausage, Chicken and Turkey  Fish and Shellfish  Eggs and Cheese    Peanut butter and nuts    Non-starchy vegetables like Broccoli, Asparagus, spinach ,Mustard Greens, Salad, Cauliflower, Green Beens, Brusel Sprouts and Celery  (avoid potatoes, corn and peas)    Butter, margarine, oil, nonstick cooking spray, Mayonnaise, Salad Dressings, Cream, Cream Cheese, Sour Cream and Whipped Cream    Sugar free Gelatin, Gum and soft drinkgs    Coffee, Tea, Club Soda, Mineral or carbonated water, Bouillon, Broth and Consomme     Condiments and Seasonings    You should also add a daily multivitamin and calcium supplement like calcium carbonate 1250 mg twice daily to your diet.    Food Sources of Carbohydrate to be Avoided  (Don t eat these foods)    Milk, Yogurt, Ice Cream, Ice Milk and Frozen Yogurt    Dried Beans and Peas    Potatoes, Corn, Peas and Squash    Fruits and Fruit Juices    Bread, Potatoes, Cereals, Pasta, Rice, Grains and Flour Products    Desserts, Candy, Sweetened Beverages, Chips and Crackers    Sugar, Syrups, Honey, Jam, Jelly and Molasses    HYPOGLYCEMIA  Warning signs of hypoglycemia:  Low blood glucose can make you feel:  shaky  dizzy  sweaty or clammy  hungry  nervous or anxious  lightheaded  barfield  confused  Some people don't get any early warning signs of a low. This is called  hypoglycemia unawareness.  It can be dangerous because their blood glucose level can drop severely low before they know it.  ? Hypoglycemia must be treated immediately. While at first it can feel bad and upsetting, low blood glucose can quickly get more serious. Severe low blood glucose can make someone pass out, have seizures, or even go into a coma.  How to treat it  If you have hypoglycemia, you need to have food or a drink that is a fast-acting carbohydrate. Good sources include fruit juice, regular (not diet) soda, glucose  tablets, or glucose gel.   For a BG of 54 to 70, a good rule of thumb is to eat or drink 15 grams of carbohydrate, wait 15 minutes, and then test your blood again. If your blood glucose is still lower than 70 mg/dl, take another 15 grams and test again 15 minutes later. Although you may feel like you want to eat more, be aware that eating too much can send your glucose too high.  There are about 15 grams of fast-acting carbohydrate in:  3-4 glucose tablets (4 gm carbs per tablets)  4 ounces of juice or soda (regular, not diet)  8 ounces of skim milk  5-6 Life Savers candies  If you use glucose tabs or gel, check the package, because doses vary from brand to brand.  If the BG is below 54, have 30 grams of carbohydrates (8 ounces of sugar juice). Recheck BG 15 minutes later and treat again, depending on the BG result.              Welcome to the [facility] Endocrinology and Diabetes Clinics     Our Endocrinology Clinics are here to provide you with a team-based, collaborative approach in the diagnosis and treatment of patients with diabetes and endocrine disorders. The team is made up of Physicians, Physician Assistants, Certified Diabetes Educators, Registered Nurses, Medical Assistants, Emergency Medical Technicians, and many others, all of whom have the unified goal of providing our patients with high quality care.     Please see below for some helpful tips to best navigate and use the [facility] Endocrinology clinic:     Solo Respect: At [facility], we are committed to a respectful and safe space for all patients, visitors, and staff.  We believe that mutual respect between patients and their care team is the foundation of quality care.  It is our expectation that you will be treated with respect by your care team.  In turn, we ask that all communication with the care team (written and verbal) be respectful and free from profanity, threatening, or abusive language.  Disrespectful  communication undermines our therapeutic relationship with you and may result in us being unable to continue to provide your care.    Refills: A provider must see you at least annually to prescribe and refill medications. This is to ensure your safety as well as meet insurance and compliance regulations.    Scheduling: Many of our Providers offer both in-person or video visits. Please call to schedule any needed follow ups as soon as possible because our provider schedules fill up very quickly. Our care team has the right to require an in-person visit when they believe that it is medically necessary. Please remember that for any virtual visits, you must be in the state [facility] at the time of the visit, otherwise we are unable to see you and you will need to be rescheduled.    Missed Appointments: If you need to cancel or miss your scheduled appointment, please call the clinic at [phone] to reschedule.  Please note if you repeatedly miss appointments or repeatedly miss appointments without calling to inform us ahead of time (no-show), the clinic may elect to not allow you to reschedule without speaking to a manager, may require a Partnership In Care Agreement prior to rescheduling, or could result in you no longer being able to receive care from the clinic. Providing the clinic with timely notification if you have to miss an appointment, allows us to better serve the needs of all of our patients.    Primary Care Provider: Our Endocrinologists are Specialists in their field. We expect you to have a Primary Care Provider established to handle any needs outside of your diabetes and endocrine care.  We would be happy to assist you find a Primary Care Provider, if you do not have one.    [employer]: [employer] is a wonderful resource that allows you access to your Care Team via online or the isacc. Please ask a member of the team if you would like help creating an account. Please note that it may take up to 2 business  days for a response. [employer] messages are not reviewed on weekends or after business hours.  Emergent or urgent care needs should never be communicated via [employer]  If you experience a medical emergency call 911 or go to the nearest emergency room.    Labs: It is recommended that you stay within the [facility] for labs but you are welcome to obtain ordered labs (with some exceptions) from any location of your choice as long as they are able to complete and process the needed labs. If you need us to fax orders to your preferred lab, please provide us the name and fax number of the lab you would like to go to so we can fax the orders. If your labs are drawn outside of the [facility], please have them fax the results to [phone] ([place]) or [phone] ([place]) or via [facility]. It is your responsibility to ensure that outside lab results are sent to us.    We look forward to working with you. Please do not hesitate to reach out with any questions.    Thank you,    The Endocrine Team    [facility] Address:   [place] Address:     [address] [place] [id number]    Phone: [phone]  Fax: [phone]   [address] [place] [address]    Phone: [phone]  Fax: [phone]     [facility] Cost Estimate Phone Number: [phone]    General Lab and Imaging Scheduling Phone Number: [phone]        =========================================================================================    The patient is seen in f/up for adrenocortical cancer.     Suzy Rodríguez is a 39 year old female with past medical history of PCOS, nephrolithiasis, MTHFR clotting disorder and gastric bypass surgery (2016), diagnosed with adrenal cortical carcinoma in June 2018.  The adrenal mass was first identified in May 2018, on an abdominal CT done for evaluation of kidney stones.  The mass measured 9.2 x 8 cm on the noncontrast CT from 5/8/18, and was inseparable from  "the liver and upper pole of the right kidney.  The chest CT from 6/14/18 identified 2 indeterminate solid pulmonary nodules, with the largest measuring 3 mm in the subpleural posterior left lower lobe.  The patient underwent right adrenalectomy on 6/25/18.  Surgery was complicated by diaphragmatic injury during hepatic mobilization and small fracture of the right liver lobe, managed conservatively.  The pathology report (re read at [facility]) revealed the following:  \"Adrenal cortical carcinoma, low grade, oncocytic type, forming a mass 11.3 cm and weighing 412 grams (per report). Surgical resection margins are positive for tumor (per report). Lymphovascular invasion is focally present. The tumor seems to be confined to the adrenal gland.  AJCC Stage (with available surgical materials): pT2NX (8th edition, 2017). Immunoperoxidase stains were performed on paraffin sections at the referring institution and reviewed at [facility] in [place], [place].  Tumor cells are positive for calretinin, synaptophysin and Melan A, supporting the above diagnosis. Ki-67 reveal an increased proliferation index of approximately 20% in the most active areas.\"    Treatment with mitotane was started in July 2018 and she completed radiation to the adrenal bed on October 8, 2018.  Titrating up the dose of mitotane was difficult. It was discontinued by Dr. Liang from [facility], in May 2021. The Mitotane level from 5/12/22 was undetectable.  Hydrocortisone was tapered down and discontinued in October 2022.  24-hour urinary cortisol from November 2022 was within normal limits.  DHEA-S remained undetectable, most recently checked ion 11/12/24.  The PET/CT from May 2023 was unremarkable. Most recent chest abdomen and pelvis CT from June 2024 was unremarkable, with the exception of a 3 cm left ovarian cyst.    As a result of treatment with mitotane, she developed secondary hypothyroidism, which was treated with 125 mcg levothyroxine " daily.  Following the discontinuation of mitotane, the dose of levothyroxine was gradually titrated down to 75 mcg daily, which the patient has been taking since May 2022.  Most recent TFTs from 11/12/24 were within normal limits.  Both thyroid peroxidase antibodies and antithyroglobulin antibodies were negative, on 11/22/2023.    Prior data prior to surgery:  5/14/18 normal plasma metanephrines  5/31/18 ALD 13.4, renin 0.8   5/28/18 urinary cortisol 351.6, 8 AM cortisol 23 (after dexamethasone), normal metanephrines     6/5/18 ACTH <10, DHEAS 740 (prior to surgery)  7/19/23 ACTH 39, DHEAS <15     Suzy underwent bariatric surgery in 6/2016. Pre-surgery weight 235 lb, inga post-surgery 135 lbs. Her weight in 4/2017 was 139 lbs. Her weight prior to adrenalectomy was 157 lbs. The current weight is 153 pounds, a slight increase since March this year.    The patient reports experiencing 3 episodes of hypoglycemia.  The first 1 occurred approximately 1 month ago, before lunch.  She does not remember if she had any food prior to the episode.  She reports experiencing tremors, a feeling of warmth, increased sweating, confusion.  Denies palpitations.  She reports feeling significantly better 5 to 10 minutes after having propranolol bar.  In the last week, she also experienced 2 episodes and both of them occurred in the evening, after having dinner.  Does not remember what she had for dinner before experiencing the hypoglycemic symptoms, but she remembers having a cocktail for one of them.  She corrected them with Halloween candies.   Prior to being diagnosed with adrenal cancer, she remembers experiencing a few similar hypoglycemic episodes.  Denies experiencing nausea, vomiting or dizziness.    Past Medical History   Past Medical History:   Diagnosis Date     Adrenal cortex cancer, right (H) 6/30/2018    Resected 6/25/18, Dr. Mansfield.     Adrenal mass (H)      Anemia     thought due to heavy menses.     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  Check in May. SCP started.  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     SHREYA on CPAP 12/17/2015    Stopped using CPAP the Fall of 2016 after weight loss as even at lower pressure/adjustment couldn't tolerate the cpap. Resting well, cautioned to watch for any signs of fatigue and consider \"titration study\" in the future to see if cpap is still required at her new weight.     Pneumonia     hx of recurrent pneumonia issues/respiratory infections.     Polycystic ovary syndrome     able to get pregnant, not on meds.     Pulmonary embolism (H) 2009 or so    briefly on wafarin.     Sleep apnea     cpap     Vitamin D deficiency    Diverticulosis  Obstructive sleep apnea which resolved following gastric bypass  GERD  Iron deficiency  Anemia  Ovarian cyst   IUD inserted on 1/25/19  Duodenal polyp, removed in February 2023    Past Surgical History   Past Surgical History:   Procedure Laterality Date     ADRENALECTOMY Right 6/25/2018    Procedure: ADRENALECTOMY;  Right Adrenalectomy,  Embolize Liver , Anesthesia Block;  Surgeon: Warren Mansfield MD;  Location: [facility] OR     ADRENALECTOMY       " ADRENALECTOMY Right       SECTION      twice      SECTION      2      SECTION      x2     COLONOSCOPY N/A 3/11/2024    Procedure: Colonoscopy;  Surgeon: Robert Lugo MD;  Location: [facility]     EMBOLECTOMY ABDOMEN N/A 2018    Procedure: EMBOLECTOMY ABDOMEN;;  Surgeon: Ector Mcintyre MD;  Location: [facility] OR     ENDOSCOPIC ULTRASOUND UPPER GASTROINTESTINAL TRACT (GI) N/A 2023    Procedure: ENDOSCOPIC ULTRASOUND, ESOPHAGOSCOPY / UPPER GASTROINTESTINAL TRACT (GI), with cystgastrostomy stent placement and dilation;  Surgeon: Khoi Gomes MD;  Location: [facility] OR     ENDOSCOPIC ULTRASOUND UPPER GASTROINTESTINAL TRACT (GI) N/A 2023    Procedure: ENDOSCOPIC ULTRASOUND, UPPER GASTROINTESTINAL TRACT (GI), Esophagogastroduodenoscopy with biopsies;  Surgeon: Khoi Gomes MD;  Location: [facility] OR     ESOPHAGOSCOPY, GASTROSCOPY, DUODENOSCOPY (EGD), COMBINED N/A 2022    Procedure: ESOPHAGOGASTRODUODENOSCOPY,  NEEDLE  WITH ENDOSCOPIC ULTRASOUND GUIDANCE;  Surgeon: Khoi Gomes MD;  Location: [facility] OR     ESOPHAGOSCOPY, GASTROSCOPY, DUODENOSCOPY (EGD), COMBINED N/A 2023    Procedure: ESOPHAGOGASTRODUODENOSCOPY with stent removal;  Surgeon: Khoi Gomes MD;  Location: [facility] OR     EXTRACORPOREAL SHOCK WAVE LITHOTRIPSY (ESWL)       GASTRIC BYPASS       ID CYSTO/URETERO W/LITHOTRIPSY &INDWELL STENT INSRT Left 2018    Procedure: CYSTOSCOPY, LEFT URETEROSCOPY LASER LITHOTRIPSY STENT INSERTION;  Surgeon: Skyler Delvalle MD;  Location: [facility] OR;  Service: Urology     ID LAP GASTRIC BYPASS/DERICK-EN-Y N/A 2016    Procedure: GASTRIC BYPASS LAPAROSCOPIC DERICK-EN-Y;  Surgeon: Randy Suthelrand MD;  Location: [facility] OR;  Service: General     TUBAL LIGATION       WISDOM TOOTH EXTRACTION Bilateral        Current Medications    Current Outpatient Medications:      acetaminophen (TYLENOL) 325 MG tablet, Take 2 tablets (650 mg) by mouth every  4 hours as needed for other (multimodal surgical pain management along with NSAIDS and opioid medication as indicated based on pain control and physical function.), Disp: 150 tablet, Rfl: 0     amphetamine-dextroamphetamine (ADDERALL) 20 MG tablet, Take 25 mg by mouth daily, Disp: , Rfl:      buPROPion (WELLBUTRIN SR) 150 MG 12 hr tablet, Take 1 tablet (150 mg) by mouth 3 times daily, Disp: 270 tablet, Rfl: 1     calcium carbonate (OS-SHASHA) 500 MG tablet, Take 1 tablet by mouth daily, Disp: , Rfl:      FLUoxetine (PROZAC) 20 MG capsule, Take 3 capsules (60 mg) by mouth 3 times daily, Disp: 270 capsule, Rfl: 1     levothyroxine (SYNTHROID/LEVOTHROID) 75 MCG tablet, Take 1 tablet (75 mcg) by mouth daily., Disp: 90 tablet, Rfl: 0     traZODone (DESYREL) 100 MG tablet, 200 mg at bedtime, Disp: , Rfl:      clonazePAM (KLONOPIN) 0.5 MG tablet, Take 1 tablet (0.5 mg) by mouth 2 times daily as needed for anxiety, Disp: 60 tablet, Rfl: 0  Iron supplement daily for 6 months     Family History   Problem Relation Age of Onset     Depression Paternal Grandmother      Diabetes Mother      Diabetes Father      Hypertension Father      Chronic Obstructive Pulmonary Disease Father      Cancer Maternal Grandmother         gastric     Urolithiasis Maternal Grandmother      Heart Disease Maternal Grandfather      Cancer Maternal Grandfather         lung     Heart Disease Paternal Grandfather      Breast Cancer No family hx of      Colon Cancer No family hx of      Prostate Cancer No family hx of      Cerebrovascular Disease No family hx of      Clotting Disorder No family hx of      Gout No family hx of        Social History   with 2 children, 7 and 10-year-old. She denies smoking, drinking alcohol or using illicit drugs. Occupation: disabled.     Review of Systems   Longstanding headaches   Spotting on IUD   No hot flashes               Vital Signs     Previous Weights:    Wt Readings from Last 10 Encounters:   11/13/24 69.4 kg  (153 lb)   06/26/24 63.5 kg (140 lb)   03/11/24 61.2 kg (135 lb)   04/19/23 66 kg (145 lb 8.1 oz)   03/21/23 67.3 kg (148 lb 6.4 oz)   02/08/23 68.7 kg (151 lb 7.3 oz)   01/18/23 69 kg (152 lb 1.9 oz)   01/09/23 71.4 kg (157 lb 5 oz)   12/13/22 71.1 kg (156 lb 12 oz)   11/21/22 71.7 kg (158 lb)        /84   Pulse 91   Resp 18   Wt 69.4 kg (153 lb)   SpO2 98%   BMI 25.46 kg/m      General appearance: she is well-developed, well-nourished, and in no distress.  Eyes: conjunctivae and extraocular motions are normal. Pupils are equal, round, and reactive to light. No lid lag, no stare.  HENT: oropharynx clear and moist; neck no JVD, no bruits, no thyromegaly, no palpable nodules  Cardiovascular: regular rhythm, no murmurs, distal pulses palpable, no edema  Respiratory: chest clear, no rales, no rhonchi  Musculoskeletal: normal tone and strength   Neurologic: reflexes normal and symmetric, no resting tremor  Psychiatric: affect and judgment normal   Skin: warm, no lesions    Lab Results  I reviewed prior lab results documented in Epic.  TSH   Date Value Ref Range Status   11/12/2024 2.28 0.30 - 4.20 uIU/mL Final   06/25/2024 2.35 0.30 - 4.20 uIU/mL Final   11/22/2023 2.27 0.30 - 4.20 uIU/mL Final   07/19/2023 1.95 0.30 - 4.20 uIU/mL Final   03/24/2023 2.26 0.30 - 4.20 uIU/mL Final   05/12/2022 0.16 (L) 0.40 - 4.00 mU/L Final   03/24/2022 0.08 (L) 0.40 - 4.00 mU/L Final   02/08/2022 0.29 (L) 0.40 - 4.00 mU/L Final   01/18/2022 0.72 0.40 - 4.00 mU/L Final   12/31/2021 0.27 (L) 0.40 - 4.00 mU/L Final   07/05/2021 0.22 (L) 0.40 - 4.00 mU/L Final   06/04/2021 0.44 0.40 - 4.00 mU/L Final   05/10/2021 0.18 (L) 0.40 - 4.00 mU/L Final   04/13/2021 0.39 (L) 0.40 - 4.00 mU/L Final   03/17/2021 0.18 (L) 0.40 - 4.00 mU/L Final     T4 Free   Date Value Ref Range Status   07/05/2021 0.91 0.76 - 1.46 ng/dL Final   06/04/2021 0.93 0.76 - 1.46 ng/dL Final   05/10/2021 0.84 0.76 - 1.46 ng/dL Final   04/13/2021 0.76 0.76 - 1.46  ng/dL Final   03/17/2021 0.82 0.76 - 1.46 ng/dL Final     Free T4   Date Value Ref Range Status   11/12/2024 1.30 0.90 - 1.70 ng/dL Final   11/22/2023 1.46 0.90 - 1.70 ng/dL Final   03/24/2023 1.25 0.90 - 1.70 ng/dL Final   01/09/2023 1.23 0.90 - 1.70 ng/dL Final   10/25/2022 0.94 0.90 - 1.70 ng/dL Final     40 minutes spent on the date of the encounter doing chart review, history and exam, documentation and further activities as noted above.  The longitudinal plan of care for the diagnosis(es)/condition(s) as documented were addressed during this visit. Due to the added complexity in care, I will continue to support Suzy in the subsequent management and with ongoing continuity of care.        Again, thank you for allowing me to participate in the care of your patient.        Sincerely,        Veena Jaquez MD

## 2024-11-13 NOTE — NURSING NOTE
Suzy Rodríguez's goals for this visit include:   Chief Complaint   Patient presents with    Follow Up    Thyroid Problem       She requests these members of her care team be copied on today's visit information: YES    PCP: Ivette Leal    Referring Provider:  Referred Self, MD  No address on file    /84   Pulse 91   Resp 18   Wt 69.4 kg (153 lb)   SpO2 98%   BMI 25.46 kg/m      Do you need any medication refills at today's visit? YES

## 2024-11-13 NOTE — PATIENT INSTRUCTIONS
Post -Gastric Bypass Anti-Hypoglycemia Diet    Low Carbohydrate Foods  Which are unlikely to produce Hypoglycemia  (Eat these foods)    Beef, Pork, [place]rish, Sausage, Chicken and Turkey  Fish and Shellfish  Eggs and Cheese    Peanut butter and nuts    Non-starchy vegetables like Broccoli, Asparagus, spinach ,Mustard Greens, Salad, Cauliflower, Green Beens, Brusel Sprouts and Celery  (avoid potatoes, corn and peas)    Butter, margarine, oil, nonstick cooking spray, Mayonnaise, Salad Dressings, Cream, Cream Cheese, Sour Cream and Whipped Cream    Sugar free Gelatin, Gum and soft drinkgs    Coffee, Tea, Club Soda, Mineral or carbonated water, Bouillon, Broth and Consomme     Condiments and Seasonings    You should also add a daily multivitamin and calcium supplement like calcium carbonate 1250 mg twice daily to your diet.    Food Sources of Carbohydrate to be Avoided  (Don t eat these foods)    Milk, Yogurt, Ice Cream, Ice Milk and Frozen Yogurt    Dried Beans and Peas    Potatoes, Corn, Peas and Squash    Fruits and Fruit Juices    Bread, Potatoes, Cereals, Pasta, Rice, Grains and Flour Products    Desserts, Candy, Sweetened Beverages, Chips and Crackers    Sugar, Syrups, Honey, Jam, Jelly and Molasses    HYPOGLYCEMIA  Warning signs of hypoglycemia:  Low blood glucose can make you feel:  shaky  dizzy  sweaty or clammy  hungry  nervous or anxious  lightheaded  barfield  confused  Some people don't get any early warning signs of a low. This is called  hypoglycemia unawareness.  It can be dangerous because their blood glucose level can drop severely low before they know it.  ? Hypoglycemia must be treated immediately. While at first it can feel bad and upsetting, low blood glucose can quickly get more serious. Severe low blood glucose can make someone pass out, have seizures, or even go into a coma.  How to treat it  If you have hypoglycemia, you need to have food or a drink that is a fast-acting carbohydrate. Good sources  include fruit juice, regular (not diet) soda, glucose tablets, or glucose gel.   For a BG of 54 to 70, a good rule of thumb is to eat or drink 15 grams of carbohydrate, wait 15 minutes, and then test your blood again. If your blood glucose is still lower than 70 mg/dl, take another 15 grams and test again 15 minutes later. Although you may feel like you want to eat more, be aware that eating too much can send your glucose too high.  There are about 15 grams of fast-acting carbohydrate in:  3-4 glucose tablets (4 gm carbs per tablets)  4 ounces of juice or soda (regular, not diet)  8 ounces of skim milk  5-6 Life Savers candies  If you use glucose tabs or gel, check the package, because doses vary from brand to brand.  If the BG is below 54, have 30 grams of carbohydrates (8 ounces of sugar juice). Recheck BG 15 minutes later and treat again, depending on the BG result.              Welcome to the [facility]ocr[facility]ology [facility] Diabetes [facility]     Our Endocrinology Clinics are here to provide you with a team-based, collaborative approach in the diagnosis and treatment of patients with diabetes and endocrine disorders. The team is made up of Physicians, Physician Assistants, Certified Diabetes Educators, Registered Nurses, Medical Assistants, Emergency Medical Technicians, and many others, all of whom have the unified goal of providing our patients with high quality care.     Please see below for some helpful tips to best navigate and use the [facility] Endocrinology clinic:     [place] Respect: At [facility], we are committed to a respectful and safe space for all patients, visitors, and staff.  We believe that mutual respect between patients and their care team is the foundation of quality care.  It is our expectation that you will be treated with respect by your care team.  In turn, we ask that all communication with the care team (written and verbal) be respectful and free from profanity,  threatening, or abusive language.  Disrespectful communication undermines our therapeutic relationship with you and may result in us being unable to continue to provide your care.    Refills: A provider must see you at least annually to prescribe and refill medications. This is to ensure your safety as well as meet insurance and compliance regulations.    Scheduling: Many of our Providers offer both in-person or video visits. Please call to schedule any needed follow ups as soon as possible because our provider schedules fill up very quickly. Our care team has the right to require an in-person visit when they believe that it is medically necessary. Please remember that for any virtual visits, you must be in the [facility] at the time of the visit, otherwise we are unable to see you and you will need to be rescheduled.    Missed Appointments: If you need to cancel or miss your scheduled appointment, please call the clinic at [phone] to reschedule.  Please note if you repeatedly miss appointments or repeatedly miss appointments without calling to inform us ahead of time (no-show), the clinic may elect to not allow you to reschedule without speaking to a manager, may require a Partnership In Care Agreement prior to rescheduling, or could result in you no longer being able to receive care from the clinic. Providing the clinic with timely notification if you have to miss an appointment, allows us to better serve the needs of all of our patients.    Primary Care Provider: Our Endocrinologists are Specialists in their field. We expect you to have a Primary Care Provider established to handle any needs outside of your diabetes and endocrine care.  We would be happy to assist you find a Primary Care Provider, if you do not have one.    "[employer]hart: [employer] is a wonderful resource that allows you access to your Care Team via online or the isacc. Please ask a member of the team if you would like help creating an  account. Please note that it may take up to 2 business days for a response. [employer] messages are not reviewed on weekends or after business hours.  Emergent or urgent care needs should never be communicated via [employer].  If you experience a medical emergency call 911 or go to the nearest emergency room.    Labs: It is recommended that you stay within the [facility] for labs but you are welcome to obtain ordered labs (with some exceptions) from any location of your choice as long as they are able to complete and process the needed labs. If you need us to fax orders to your preferred lab, please provide us the name and fax number of the lab you would like to go to so we can fax the orders. If your labs are drawn outside of the [facility], please have them fax the results to [phone] ([place]) or [phone] ([place]) or via [facility]. It is your responsibility to ensure that outside lab results are sent to us.    We look forward to working with you. Please do not hesitate to reach out with any questions.    Thank you,    The Endocrine Team    [facility] Address:   [place] Address:     [address] [place] [address]    Phone: [phone]  Fax: [phone]   [address] [place] [address]    Phone: [phone]  Fax: [phone]     [facility] [place] Cost Estimate Phone Number: [phone]    General Lab and Imaging Scheduling Phone Number: [phone]

## 2024-11-13 NOTE — PROGRESS NOTES
=========================================================================================    Assessment     1. 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 Ki-67 revealed an increased proliferation index of approximately 20%.   The tumor was functional and presented with high cortisol and DHEAS levels.  Post surgery/radiation and subtherapeutic treatment with mitotane (from July 2018 to May 2021). Hydrocortisone was discontinued in October 2022. The DHEAS has remained undetectable.  Screening for hypercortisolism last year was negative.  Clinically, the patient does not endorse signs or symptoms suggestive of cortisol excess.  Most recent imaging CT was negative.    2. Hypothyroidism   Clinically and biochemically, she is euthyroid.  I recommended to continue to take the same dose of levothyroxine.    3.  Status post bariatric surgery  Recommended a daily intake from food products and//supplements of 3761-7345 mg calcium.  She should continue to take 2000 units vitamin D daily, as prior vitamin D levels have been normal while taking this dose.    4.  Episodes of tremor, feeling of warmth, increased sweating and confusion, highly suggestive of hypoglycemia.  Counseled the patient on hypoglycemic symptoms, the correction of hypoglycemia by using 15 g of easily absorbable carbohydrates, and the main treatment which consists in dietary changes to reduce the amount of easily absorbable carbohydrates and incorporate fat and proteins with her meals and snacks.  Prescription placed for a glucometer and test strips.    Patient Instructions   Post -Gastric Bypass Anti-Hypoglycemia Diet    Low Carbohydrate Foods  Which are unlikely to produce Hypoglycemia  (Eat these foods)    Beef, Pork, Parrish, Sausage, Chicken and Turkey  Fish and Shellfish  Eggs and Cheese    Peanut butter and nuts    Non-starchy vegetables like Broccoli,  Asparagus, spinach ,Mustard Greens, Salad, Cauliflower, Green Beens, Brusel Sprouts and Celery  (avoid potatoes, corn and peas)    Butter, margarine, oil, nonstick cooking spray, Mayonnaise, Salad Dressings, Cream, Cream Cheese, Sour Cream and Whipped Cream    Sugar free Gelatin, Gum and soft drinkgs    Coffee, Tea, Club Soda, Mineral or carbonated water, Bouillon, Broth and Consomme     Condiments and Seasonings    You should also add a daily multivitamin and calcium supplement like calcium carbonate 1250 mg twice daily to your diet.    Food Sources of Carbohydrate to be Avoided  (Don t eat these foods)    Milk, Yogurt, Ice Cream, Ice Milk and Frozen Yogurt    Dried Beans and Peas    Potatoes, Corn, Peas and Squash    Fruits and Fruit Juices    Bread, Potatoes, Cereals, Pasta, Rice, Grains and Flour Products    Desserts, Candy, Sweetened Beverages, Chips and Crackers    Sugar, Syrups, Honey, Jam, Jelly and Molasses    HYPOGLYCEMIA  Warning signs of hypoglycemia:  Low blood glucose can make you feel:  shaky  dizzy  sweaty or clammy  hungry  nervous or anxious  lightheaded  barfield  confused  Some people don't get any early warning signs of a low. This is called  hypoglycemia unawareness.  It can be dangerous because their blood glucose level can drop severely low before they know it.  ? Hypoglycemia must be treated immediately. While at first it can feel bad and upsetting, low blood glucose can quickly get more serious. Severe low blood glucose can make someone pass out, have seizures, or even go into a coma.  How to treat it  If you have hypoglycemia, you need to have food or a drink that is a fast-acting carbohydrate. Good sources include fruit juice, regular (not diet) soda, glucose tablets, or glucose gel.   For a BG of 54 to 70, a good rule of thumb is to eat or drink 15 grams of carbohydrate, wait 15 minutes, and then test your blood again. If your blood glucose is still lower than 70 mg/dl, take another 15  grams and test again 15 minutes later. Although you may feel like you want to eat more, be aware that eating too much can send your glucose too high.  There are about 15 grams of fast-acting carbohydrate in:  3-4 glucose tablets (4 gm carbs per tablets)  4 ounces of juice or soda (regular, not diet)  8 ounces of skim milk  5-6 Life Savers candies  If you use glucose tabs or gel, check the package, because doses vary from brand to brand.  If the BG is below 54, have 30 grams of carbohydrates (8 ounces of sugar juice). Recheck BG 15 minutes later and treat again, depending on the BG result.              Welcome to the [facility]     Our Endocrinology Clinics are here to provide you with a team-based, collaborative approach in the diagnosis and treatment of patients with diabetes and endocrine disorders. The team is made up of Physicians, Physician Assistants, Certified Diabetes Educators, Registered Nurses, Medical Assistants, Emergency Medical Technicians, and many others, all of whom have the unified goal of providing our patients with high quality care.     Please see below for some helpful tips to best navigate and use the [facility] Endocrinology clinic:     [place] Respect: At [facility], we are committed to a respectful and safe space for all patients, visitors, and staff.  We believe that mutual respect between patients and their care team is the foundation of quality care.  It is our expectation that you will be treated with respect by your care team.  In turn, we ask that all communication with the care team (written and verbal) be respectful and free from profanity, threatening, or abusive language.  Disrespectful communication undermines our therapeutic relationship with you and may result in us being unable to continue to provide your care.    Refills: A provider must see you at least annually to prescribe and refill medications. This is to ensure  your safety as well as meet insurance and compliance regulations.    Scheduling: Many of our Providers offer both in-person or video visits. Please call to schedule any needed follow ups as soon as possible because our provider schedules fill up very quickly. Our care team has the right to require an in-person visit when they believe that it is medically necessary. Please remember that for any virtual visits, you must be in the state of [place] at the time of the visit, otherwise we are unable to see you and you will need to be rescheduled.    Missed Appointments: If you need to cancel or miss your scheduled appointment, please call the clinic at [phone] to reschedule.  Please note if you repeatedly miss appointments or repeatedly miss appointments without calling to inform us ahead of time (no-show), the clinic may elect to not allow you to reschedule without speaking to a manager, may require a Partnership In Care Agreement prior to rescheduling, or could result in you no longer being able to receive care from the clinic. Providing the clinic with timely notification if you have to miss an appointment, allows us to better serve the needs of all of our patients.    Primary Care Provider: Our Endocrinologists are Specialists in their field. We expect you to have a Primary Care Provider established to handle any needs outside of your diabetes and endocrine care.  We would be happy to assist you find a Primary Care Provider, if you do not have one.    Auto[employer]: [employer] is a wonderful resource that allows you access to your Care Team via online or the isacc. Please ask a member of the team if you would like help creating an account. Please note that it may take up to 2 business days for a response. [employer] messages are not reviewed on weekends or after business hours.  Emergent or urgent care needs should never be communicated via [employer].  If you experience a medical emergency call 911 or go to the nearest  emergency room.    Labs: It is recommended that you stay within the [facility] for labs but you are welcome to obtain ordered labs (with some exceptions) from any location of your choice as long as they are able to complete and process the needed labs. If you need us to fax orders to your preferred lab, please provide us the name and fax number of the lab you would like to go to so we can fax the orders. If your labs are drawn outside of the [facility], please have them fax the results to [phone] ([place]) or [phone] ([place]) or via [employer]. It is your responsibility to ensure that outside lab results are sent to us.    We look forward to working with you. Please do not hesitate to reach out with any questions.    Thank you,    The Endocrine Team    [facility] Address:   [place] Address:     [address] [place] [address]    Phone: [phone]  Fax: [phone]   [address] [place] [address]    Phone: [phone]  Fax: [phone]     [facility] Cost Estimate Phone Number: [phone]    General Lab and Imaging Scheduling Phone Number: [phone]        =========================================================================================    The patient is seen in f/up for adrenocortical cancer.     Suzy Rodríguez is a 39 year old female with past medical history of PCOS, nephrolithiasis, MTHFR clotting disorder and gastric bypass surgery (2016), diagnosed with adrenal cortical carcinoma in June 2018.  The adrenal mass was first identified in May 2018, on an abdominal CT done for evaluation of kidney stones.  The mass measured 9.2 x 8 cm on the noncontrast CT from 5/8/18, and was inseparable from the liver and upper pole of the right kidney.  The chest CT from 6/14/18 identified 2 indeterminate solid pulmonary nodules, with the largest measuring 3 mm in the subpleural posterior left lower lobe.  The patient underwent right  "adrenalectomy on 6/25/18.  Surgery was complicated by diaphragmatic injury during hepatic mobilization and small fracture of the right liver lobe, managed conservatively.  The pathology report (re read at [facility]) revealed the following:  \"Adrenal cortical carcinoma, low grade, oncocytic type, forming a mass 11.3 cm and weighing 412 grams (per report). Surgical resection margins are positive for tumor (per report). Lymphovascular invasion is focally present. The tumor seems to be confined to the adrenal gland.  AJCC Stage (with available surgical materials): pT2NX (8th edition, 2017). Immunoperoxidase stains were performed on paraffin sections at the referring institution and reviewed at [facility] in [place], [place].  Tumor cells are positive for calretinin, synaptophysin and Melan A, supporting the above diagnosis. Ki-67 reveal an increased proliferation index of approximately 20% in the most active areas.\"    Treatment with mitotane was started in July 2018 and she completed radiation to the adrenal bed on October 8, 2018.  Titrating up the dose of mitotane was difficult. It was discontinued by Dr. Liang from [facility], in May 2021. The Mitotane level from 5/12/22 was undetectable.  Hydrocortisone was tapered down and discontinued in October 2022.  24-hour urinary cortisol from November 2022 was within normal limits.  DHEA-S remained undetectable, most recently checked ion 11/12/24.  The PET/CT from May 2023 was unremarkable. Most recent chest abdomen and pelvis CT from June 2024 was unremarkable, with the exception of a 3 cm left ovarian cyst.    As a result of treatment with mitotane, she developed secondary hypothyroidism, which was treated with 125 mcg levothyroxine daily.  Following the discontinuation of mitotane, the dose of levothyroxine was gradually titrated down to 75 mcg daily, which the patient has been taking since May 2022.  Most recent TFTs from 11/12/24 were within normal limits. "  Both thyroid peroxidase antibodies and antithyroglobulin antibodies were negative, on 11/22/2023.    Prior data prior to surgery:  5/14/18 normal plasma metanephrines  5/31/18 ALD 13.4, renin 0.8   5/28/18 urinary cortisol 351.6, 8 AM cortisol 23 (after dexamethasone), normal metanephrines     6/5/18 ACTH <10, DHEAS 740 (prior to surgery)  7/19/23 ACTH 39, DHEAS <15     Suzy underwent bariatric surgery in 6/2016. Pre-surgery weight 235 lb, inga post-surgery 135 lbs. Her weight in 4/2017 was 139 lbs. Her weight prior to adrenalectomy was 157 lbs. The current weight is 153 pounds, a slight increase since March this year.    The patient reports experiencing 3 episodes of hypoglycemia.  The first 1 occurred approximately 1 month ago, before lunch.  She does not remember if she had any food prior to the episode.  She reports experiencing tremors, a feeling of warmth, increased sweating, confusion.  Denies palpitations.  She reports feeling significantly better 5 to 10 minutes after having propranolol bar.  In the last week, she also experienced 2 episodes and both of them occurred in the evening, after having dinner.  Does not remember what she had for dinner before experiencing the hypoglycemic symptoms, but she remembers having a cocktail for one of them.  She corrected them with Halloween candies.   Prior to being diagnosed with adrenal cancer, she remembers experiencing a few similar hypoglycemic episodes.  Denies experiencing nausea, vomiting or dizziness.    Past Medical History   Past Medical History:   Diagnosis Date    Adrenal cortex cancer, right (H) 6/30/2018    Resected 6/25/18, Dr. Mansfield.    Adrenal mass (H)     Anemia     thought due to heavy menses.    Calculus of ureter 5/9/2018    Carcinoma, adrenal cortical (H) 7/18/2018    Chocolate cyst of ovary 2011    Clotting disorder (H)     Depression     GERD (gastroesophageal reflux disease)     History of miscarriage     Hypertension due to endocrine  "disorder 2018    Kidney stones     passed on their own    Malignant neoplasm of cortex of right adrenal gland (H) 2018    EOTD; 18.  Check in May. SCP started.  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    SHREYA on CPAP 2015    Stopped using CPAP the  after weight loss as even at lower pressure/adjustment couldn't tolerate the cpap. Resting well, cautioned to watch for any signs of fatigue and consider \"titration study\" in the future to see if cpap is still required at her new weight.    Pneumonia     hx of recurrent pneumonia issues/respiratory infections.    Polycystic ovary syndrome     able to get pregnant, not on meds.    Pulmonary embolism (H)  or so    briefly on wafarin.    Sleep apnea     cpap    Vitamin D deficiency    Diverticulosis  Obstructive sleep apnea which resolved following gastric bypass  GERD  Iron deficiency  Anemia  Ovarian cyst   IUD inserted on 19  Duodenal polyp, removed in 2023    Past Surgical History   Past Surgical History:   Procedure Laterality Date    ADRENALECTOMY Right 2018    Procedure: ADRENALECTOMY;  Right Adrenalectomy,  Embolize Liver , Anesthesia Block;  Surgeon: Warren Mansfield MD;  Location: [facility] OR    ADRENALECTOMY      ADRENALECTOMY Right      SECTION      twice     SECTION      2     SECTION      x2    COLONOSCOPY N/A 3/11/2024    Procedure: Colonoscopy;  Surgeon: Robert Lugo MD;  Location:  GI    EMBOLECTOMY ABDOMEN N/A 2018    Procedure: " EMBOLECTOMY ABDOMEN;;  Surgeon: Ector Mcintyre MD;  Location: [facility] OR    ENDOSCOPIC ULTRASOUND UPPER GASTROINTESTINAL TRACT (GI) N/A 1/18/2023    Procedure: ENDOSCOPIC ULTRASOUND, ESOPHAGOSCOPY / UPPER GASTROINTESTINAL TRACT (GI), with cystgastrostomy stent placement and dilation;  Surgeon: Khoi Gomes MD;  Location: [facility] OR    ENDOSCOPIC ULTRASOUND UPPER GASTROINTESTINAL TRACT (GI) N/A 2/8/2023    Procedure: ENDOSCOPIC ULTRASOUND, UPPER GASTROINTESTINAL TRACT (GI), Esophagogastroduodenoscopy with biopsies;  Surgeon: Khoi Gomes MD;  Location: [facility] OR    ESOPHAGOSCOPY, GASTROSCOPY, DUODENOSCOPY (EGD), COMBINED N/A 12/13/2022    Procedure: ESOPHAGOGASTRODUODENOSCOPY,  NEEDLE  WITH ENDOSCOPIC ULTRASOUND GUIDANCE;  Surgeon: Khoi Gomes MD;  Location: [facility] OR    ESOPHAGOSCOPY, GASTROSCOPY, DUODENOSCOPY (EGD), COMBINED N/A 4/19/2023    Procedure: ESOPHAGOGASTRODUODENOSCOPY with stent removal;  Surgeon: Khoi Gomes MD;  Location: [facility] OR    EXTRACORPOREAL SHOCK WAVE LITHOTRIPSY (ESWL)      GASTRIC BYPASS  2016    KS CYSTO/URETERO W/LITHOTRIPSY &INDWELL STENT INSRT Left 5/11/2018    Procedure: CYSTOSCOPY, LEFT URETEROSCOPY LASER LITHOTRIPSY STENT INSERTION;  Surgeon: Skyler Delvalle MD;  Location: [facility];  Service: Urology    KS LAP GASTRIC BYPASS/DERICK-EN-Y N/A 6/8/2016    Procedure: GASTRIC BYPASS LAPAROSCOPIC DERICK-EN-Y;  Surgeon: Randy Sutherland MD;  Location: [facility] OR;  Service: General    TUBAL LIGATION      WISDOM TOOTH EXTRACTION Bilateral        Current Medications    Current Outpatient Medications:     acetaminophen (TYLENOL) 325 MG tablet, Take 2 tablets (650 mg) by mouth every 4 hours as needed for other (multimodal surgical pain management along with NSAIDS and opioid medication as indicated based on pain control and physical function.), Disp: 150 tablet, Rfl: 0    amphetamine-dextroamphetamine (ADDERALL) 20 MG tablet, Take 25 mg by mouth daily,  Disp: , Rfl:     buPROPion (WELLBUTRIN SR) 150 MG 12 hr tablet, Take 1 tablet (150 mg) by mouth 3 times daily, Disp: 270 tablet, Rfl: 1    calcium carbonate (OS-SHASHA) 500 MG tablet, Take 1 tablet by mouth daily, Disp: , Rfl:     FLUoxetine (PROZAC) 20 MG capsule, Take 3 capsules (60 mg) by mouth 3 times daily, Disp: 270 capsule, Rfl: 1    levothyroxine (SYNTHROID/LEVOTHROID) 75 MCG tablet, Take 1 tablet (75 mcg) by mouth daily., Disp: 90 tablet, Rfl: 0    traZODone (DESYREL) 100 MG tablet, 200 mg at bedtime, Disp: , Rfl:     clonazePAM (KLONOPIN) 0.5 MG tablet, Take 1 tablet (0.5 mg) by mouth 2 times daily as needed for anxiety, Disp: 60 tablet, Rfl: 0  Iron supplement daily for 6 months     Family History   Problem Relation Age of Onset    Depression Paternal Grandmother     Diabetes Mother     Diabetes Father     Hypertension Father     Chronic Obstructive Pulmonary Disease Father     Cancer Maternal Grandmother         gastric    Urolithiasis Maternal Grandmother     Heart Disease Maternal Grandfather     Cancer Maternal Grandfather         lung    Heart Disease Paternal Grandfather     Breast Cancer No family hx of     Colon Cancer No family hx of     Prostate Cancer No family hx of     Cerebrovascular Disease No family hx of     Clotting Disorder No family hx of     Gout No family hx of        Social History   with 2 children, 7 and 10-year-old. She denies smoking, drinking alcohol or using illicit drugs. Occupation: disabled.     Review of Systems   Longstanding headaches   Spotting on IUD   No hot flashes               Vital Signs     Previous Weights:    Wt Readings from Last 10 Encounters:   11/13/24 69.4 kg (153 lb)   06/26/24 63.5 kg (140 lb)   03/11/24 61.2 kg (135 lb)   04/19/23 66 kg (145 lb 8.1 oz)   03/21/23 67.3 kg (148 lb 6.4 oz)   02/08/23 68.7 kg (151 lb 7.3 oz)   01/18/23 69 kg (152 lb 1.9 oz)   01/09/23 71.4 kg (157 lb 5 oz)   12/13/22 71.1 kg (156 lb 12 oz)   11/21/22 71.7 kg (158 lb)         /84   Pulse 91   Resp 18   Wt 69.4 kg (153 lb)   SpO2 98%   BMI 25.46 kg/m      General appearance: she is well-developed, well-nourished, and in no distress.  Eyes: conjunctivae and extraocular motions are normal. Pupils are equal, round, and reactive to light. No lid lag, no stare.  HENT: oropharynx clear and moist; neck no JVD, no bruits, no thyromegaly, no palpable nodules  Cardiovascular: regular rhythm, no murmurs, distal pulses palpable, no edema  Respiratory: chest clear, no rales, no rhonchi  Musculoskeletal: normal tone and strength   Neurologic: reflexes normal and symmetric, no resting tremor  Psychiatric: affect and judgment normal   Skin: warm, no lesions    Lab Results  I reviewed prior lab results documented in Epic.  TSH   Date Value Ref Range Status   11/12/2024 2.28 0.30 - 4.20 uIU/mL Final   06/25/2024 2.35 0.30 - 4.20 uIU/mL Final   11/22/2023 2.27 0.30 - 4.20 uIU/mL Final   07/19/2023 1.95 0.30 - 4.20 uIU/mL Final   03/24/2023 2.26 0.30 - 4.20 uIU/mL Final   05/12/2022 0.16 (L) 0.40 - 4.00 mU/L Final   03/24/2022 0.08 (L) 0.40 - 4.00 mU/L Final   02/08/2022 0.29 (L) 0.40 - 4.00 mU/L Final   01/18/2022 0.72 0.40 - 4.00 mU/L Final   12/31/2021 0.27 (L) 0.40 - 4.00 mU/L Final   07/05/2021 0.22 (L) 0.40 - 4.00 mU/L Final   06/04/2021 0.44 0.40 - 4.00 mU/L Final   05/10/2021 0.18 (L) 0.40 - 4.00 mU/L Final   04/13/2021 0.39 (L) 0.40 - 4.00 mU/L Final   03/17/2021 0.18 (L) 0.40 - 4.00 mU/L Final     T4 Free   Date Value Ref Range Status   07/05/2021 0.91 0.76 - 1.46 ng/dL Final   06/04/2021 0.93 0.76 - 1.46 ng/dL Final   05/10/2021 0.84 0.76 - 1.46 ng/dL Final   04/13/2021 0.76 0.76 - 1.46 ng/dL Final   03/17/2021 0.82 0.76 - 1.46 ng/dL Final     Free T4   Date Value Ref Range Status   11/12/2024 1.30 0.90 - 1.70 ng/dL Final   11/22/2023 1.46 0.90 - 1.70 ng/dL Final   03/24/2023 1.25 0.90 - 1.70 ng/dL Final   01/09/2023 1.23 0.90 - 1.70 ng/dL Final   10/25/2022 0.94 0.90 - 1.70 ng/dL  Final     40 minutes spent on the date of the encounter doing chart review, history and exam, documentation and further activities as noted above.  The longitudinal plan of care for the diagnosis(es)/condition(s) as documented were addressed during this visit. Due to the added complexity in care, I will continue to support Suzy in the subsequent management and with ongoing continuity of care.

## 2024-11-14 ENCOUNTER — ANCILLARY PROCEDURE (OUTPATIENT)
Dept: MAMMOGRAPHY | Facility: CLINIC | Age: 39
End: 2024-11-14
Attending: FAMILY MEDICINE
Payer: COMMERCIAL

## 2024-11-14 DIAGNOSIS — Z12.31 VISIT FOR SCREENING MAMMOGRAM: ICD-10-CM

## 2024-11-14 PROCEDURE — 77063 BREAST TOMOSYNTHESIS BI: CPT

## 2024-11-14 PROCEDURE — 77067 SCR MAMMO BI INCL CAD: CPT

## 2024-11-19 ENCOUNTER — E-VISIT (OUTPATIENT)
Dept: FAMILY MEDICINE | Facility: CLINIC | Age: 39
End: 2024-11-19
Payer: COMMERCIAL

## 2024-11-19 DIAGNOSIS — G56.03 BILATERAL CARPAL TUNNEL SYNDROME: Primary | ICD-10-CM

## 2024-11-20 NOTE — PATIENT INSTRUCTIONS
Thank you for choosing us for your care. I have placed the below referral(s) for you:  Orders Placed This Encounter   Procedures     Orthopedic  Referral     Please click the link for your After Visit Summary to view scheduling instructions for your referral. The [facility]  will call you to coordinate your care as prescribed by your provider. A representative will call you within 2 business days to help you schedule your appointment, or you may contact the  Representative at: [phone].

## 2024-11-21 ENCOUNTER — PATIENT OUTREACH (OUTPATIENT)
Dept: CARE COORDINATION | Facility: CLINIC | Age: 39
End: 2024-11-21
Payer: COMMERCIAL

## 2024-12-03 ENCOUNTER — ANCILLARY PROCEDURE (OUTPATIENT)
Dept: GENERAL RADIOLOGY | Facility: CLINIC | Age: 39
End: 2024-12-03
Attending: STUDENT IN AN ORGANIZED HEALTH CARE EDUCATION/TRAINING PROGRAM
Payer: COMMERCIAL

## 2024-12-03 ENCOUNTER — OFFICE VISIT (OUTPATIENT)
Dept: ORTHOPEDICS | Facility: CLINIC | Age: 39
End: 2024-12-03
Attending: FAMILY MEDICINE
Payer: COMMERCIAL

## 2024-12-03 VITALS — BODY MASS INDEX: 26.12 KG/M2 | WEIGHT: 153 LBS | HEIGHT: 64 IN

## 2024-12-03 DIAGNOSIS — G56.03 BILATERAL CARPAL TUNNEL SYNDROME: ICD-10-CM

## 2024-12-03 PROCEDURE — 99204 OFFICE O/P NEW MOD 45 MIN: CPT | Performed by: STUDENT IN AN ORGANIZED HEALTH CARE EDUCATION/TRAINING PROGRAM

## 2024-12-03 PROCEDURE — 73110 X-RAY EXAM OF WRIST: CPT | Mod: TC | Performed by: RADIOLOGY

## 2024-12-03 NOTE — PATIENT INSTRUCTIONS
# Bilateral wrist pain (L>R): Suzy Rodríguez  was seen today for bilateral wrist pain with numbness/tingling in median nerve distribution of the palm and fingers (L>R). Symptoms had been going on for 1 month. On examination there are positive findings of positive carpal tunnel testing. Imaging findings showed unremarkable wrist xrays. Likely cause of patient's condition due to carpal tunnel syndrome. Counseled patient on nature of condition and treatment options. Patient has history of adrenal carcinoma and subsequent insufficiency, so would like to start with hand therapy, continuing wrist splints and check with endo/ oncologist if they are okay with cortisone injections, which is very reasonable.     - Activity: continue night splints. Hand therapy referral sent  - Medications:      - allergy to NSAIDs and hx gastric bypass, so avoiding this     - Okay to take tylenol 1000mg three times daily for pain    Follow-up: In 4-6 weeks if symptoms do not improve, sooner if worsening  Can consider carpal tunnel cortisone injection or referral to surgeon    Please call [phone] and ask for my team if you have any questions or concerns.

## 2024-12-03 NOTE — PROGRESS NOTES
ASSESSMENT & PLAN    Suzy was seen today for pain and pain.    Diagnoses and all orders for this visit:    Bilateral carpal tunnel syndrome  -     Orthopedic  Referral  -     XR Wrist Bilateral G/E 3 vw; Future  -     Hand Therapy Referral; Future      This issue is sub acute and Worsening.    # Bilateral wrist pain (L>R): Suzy Rodríguez  was seen today for bilateral wrist pain with numbness/tingling in median nerve distribution of the palm and fingers (L>R). Symptoms had been going on for 1 month. On examination there are positive findings of positive carpal tunnel testing. Imaging findings showed unremarkable wrist xrays. Likely cause of patient's condition due to carpal tunnel syndrome. Counseled patient on nature of condition and treatment options. Patient has history of adrenal carcinoma and subsequent insufficiency, so would like to start with hand therapy, continuing wrist splints and check with endo/ oncologist if they are okay with cortisone injections.    - Activity: continue night splints. Hand therapy referral sent  - Medications:      - allergy to NSAIDs and hx gastric bypass, so avoiding this     - Okay to take tylenol 1000mg three times daily for pain    Follow-up: In 4-6 weeks if symptoms do not improve, sooner if worsening  Can consider carpal tunnel cortisone injection or referral to surgeon      Breezy Moreno MD  [facility] [place]    -----  Chief Complaint   Patient presents with    Right Wrist - Pain    Left Wrist - Pain       SUBJECTIVE  Suzy Rodríguez is a/an 39 year old female who is seen in consultation at the request of  Ivette Leal M.D. for evaluation of bilateral wrists.     The patient is seen by themselves.  The patient is Right handed    Onset: 1 month(s) ago. The patient reports experiencing similar symptoms in the past with crocheting. She stopped crocheting for a while but started again about 1 month ago and her  "symptoms recurred.  Location of Pain: bilateral thumb, index and middle fingers, radial wrist and palm; L>R  Worsened by: wrist movement, crocheting, worst at night  Better with: wrist brace  Treatments tried:  left wrist brace, Tylenol, ice  Associated symptoms: numbness and tingling; fingers feel swollen; \"cold\" sensation in ulnar elbow and forearm    Orthopedic/Surgical history: YES - similar symptoms ~7-8 years ago, treated with activity avoidance  Social History/Occupation: works as radiation therapist      REVIEW OF SYSTEMS:  Review of Systems    OBJECTIVE:  Ht 1.626 m (5' 4\")   Wt 69.4 kg (153 lb)   BMI 26.26 kg/m     General: healthy, alert and in no distress  Skin: no suspicious lesions or rash.  CV: distal perfusion intact   Resp: normal respiratory effort without conversational dyspnea   Psych: normal mood and affect  Gait: NORMAL  Neuro: Decreased light sensation of the left hand in the median nerve distribution> radial/ ulnar    BILATERAL WRIST  Inspection:    No swelling or obvious deformity or asymmetry  Palpation:    Tender about the carpal tunnel/ volar wrist and palm L>R. Remainder of bony and ligamentous line marks are nontender.     Metacarpals: normal    Thumb: normal    Fingers: normal  Range of Motion:    Full (active and passive) flexion, extension, pronation/supination, and ulnar/radial deviation.  Strength:    No deficits in flexion, extension, ulnar/radial deviation, or  strength.. Normal pinch strength.  Special Tests:    Positive: Phalen's, Tinel's (carpal tunnel) L>R       RADIOLOGY:  Final results and radiologist's interpretation, available in the [facility] health record.  Images were reviewed with the patient in the office today.  My personal interpretation of the performed imaging:  - unremarkable wrist xrays     "

## 2024-12-03 NOTE — LETTER
12/3/2024      Suzy Rodríguez  [address]  [place] [facility] [place] [facility] [address]      Dear Colleague,    Thank you for referring your patient, Suzy Rodríguez, to the [facility]. Please see a copy of my visit note below.    ASSESSMENT & PLAN    Suzy was seen today for pain and pain.    Diagnoses and all orders for this visit:    Bilateral carpal tunnel syndrome  -     Orthopedic  Referral  -     XR Wrist Bilateral G/E 3 vw; Future  -     Hand Therapy Referral; Future      This issue is sub acute and Worsening.    # Bilateral wrist pain (L>R): Suzy Rodríguez  was seen today for bilateral wrist pain with numbness/tingling in median nerve distribution of the palm and fingers (L>R). Symptoms had been going on for 1 month. On examination there are positive findings of positive carpal tunnel testing. Imaging findings showed unremarkable wrist xrays. Likely cause of patient's condition due to carpal tunnel syndrome. Counseled patient on nature of condition and treatment options. Patient has history of adrenal carcinoma and subsequent insufficiency, so would like to start with hand therapy, continuing wrist splints and check with endo/ oncologist if they are okay with cortisone injections.    - Activity: continue night splints. Hand therapy referral sent  - Medications:      - allergy to NSAIDs and hx gastric bypass, so avoiding this     - Okay to take tylenol 1000mg three times daily for pain    Follow-up: In 4-6 weeks if symptoms do not improve, sooner if worsening  Can consider carpal tunnel cortisone injection or referral to surgeon      Breezy Moreno MD  [facility]    -----  Chief Complaint   Patient presents with     Right Wrist - Pain     Left Wrist - Pain       SUBJECTIVE  Suzy Rodríguez is a/an 39 year old female who is seen in consultation at the request of  Ivette Leal M.D. for evaluation of bilateral wrists.  "    The patient is seen by themselves.  The patient is Right handed    Onset: 1 month(s) ago. The patient reports experiencing similar symptoms in the past with crocheting. She stopped crocheting for a while but started again about 1 month ago and her symptoms recurred.  Location of Pain: bilateral thumb, index and middle fingers, radial wrist and palm; L>R  Worsened by: wrist movement, crocheting, worst at night  Better with: wrist brace  Treatments tried:  left wrist brace, Tylenol, ice  Associated symptoms: numbness and tingling; fingers feel swollen; \"cold\" sensation in ulnar elbow and forearm    Orthopedic/Surgical history: YES - similar symptoms ~7-8 years ago, treated with activity avoidance  Social History/Occupation: works as radiation therapist      REVIEW OF SYSTEMS:  Review of Systems    OBJECTIVE:  Ht 1.626 m (5' 4\")   Wt 69.4 kg (153 lb)   BMI 26.26 kg/m     General: healthy, alert and in no distress  Skin: no suspicious lesions or rash.  CV: distal perfusion intact   Resp: normal respiratory effort without conversational dyspnea   Psych: normal mood and affect  Gait: NORMAL  Neuro: Decreased light sensation of the left hand in the median nerve distribution> radial/ ulnar    BILATERAL WRIST  Inspection:    No swelling or obvious deformity or asymmetry  Palpation:    Tender about the carpal tunnel/ volar wrist and palm L>R. Remainder of bony and ligamentous line marks are nontender.     Metacarpals: normal    Thumb: normal    Fingers: normal  Range of Motion:    Full (active and passive) flexion, extension, pronation/supination, and ulnar/radial deviation.  Strength:    No deficits in flexion, extension, ulnar/radial deviation, or  strength.. Normal pinch strength.  Special Tests:    Positive: Phalen's, Tinel's (carpal tunnel) L>R       RADIOLOGY:  Final results and radiologist's interpretation, available in the [facility] health record.  Images were reviewed with the patient in the office today.  My " personal interpretation of the performed imaging:  - unremarkable wrist xrays       Again, thank you for allowing me to participate in the care of your patient.        Sincerely,        Breezy Moreno MD

## 2024-12-09 ENCOUNTER — THERAPY VISIT (OUTPATIENT)
Dept: OCCUPATIONAL THERAPY | Facility: CLINIC | Age: 39
End: 2024-12-09
Attending: STUDENT IN AN ORGANIZED HEALTH CARE EDUCATION/TRAINING PROGRAM
Payer: COMMERCIAL

## 2024-12-09 DIAGNOSIS — G56.03 BILATERAL CARPAL TUNNEL SYNDROME: ICD-10-CM

## 2024-12-09 PROCEDURE — 97110 THERAPEUTIC EXERCISES: CPT | Mod: GO | Performed by: OCCUPATIONAL THERAPIST

## 2024-12-09 PROCEDURE — 97535 SELF CARE MNGMENT TRAINING: CPT | Mod: GO | Performed by: OCCUPATIONAL THERAPIST

## 2024-12-09 PROCEDURE — 97165 OT EVAL LOW COMPLEX 30 MIN: CPT | Mod: GO | Performed by: OCCUPATIONAL THERAPIST

## 2024-12-09 NOTE — PROGRESS NOTES
OCCUPATIONAL THERAPY EVALUATION  Type of Visit: Evaluation        Fall Risk Screen:  Fall screen completed by: OT  Have you fallen 2 or more times in the past year?: No  Have you fallen and had an injury in the past year?: No  Is patient a fall risk?: No    Subjective        Presenting condition or subjective complaint: (Patient-Rptd) carpal tunnel- Also having a funny bone type feeling in both elbows. . Patient relates that she has been having numbness and tingling several times a day for the past month. Most complaints in median digits however some times in ulnar if having elbow issues. . Patient moving patients a lot . This is keeping patient up at night. She does wear night splints which is helpful. Left side is worse than the right  Date of onset: 11/09/24    Relevant medical history: (Patient-Rptd) Anemia; Cancer; Radiation treatment; Thyroid problems   Dates & types of surgery: (Patient-Rptd) adrenalectomy 2018    Prior diagnostic imaging/testing results: (Patient-Rptd) X-ray     Prior therapy history for the same diagnosis, illness or injury:    none    Prior Level of Function  Transfers: Independent  Ambulation: Independent  ADL: Independent  IADL:  independent    Living Environment  Social support: (Patient-Rptd) With family members   Type of home: (Patient-Rptd) House   Stairs to enter the home:         Ramp: (Patient-Rptd) No   Stairs inside the home: (Patient-Rptd) Yes (Patient-Rptd) 1 Is there a railing: (Patient-Rptd) Yes     Help at home: (Patient-Rptd) None  Equipment owned:       Employment: (Patient-Rptd) Yes (Patient-Rptd) radiation therapist  Hobbies/Interests: (Patient-Rptd) cal[employer] crockett    Patient goals for therapy: (Patient-Rptd) numbness    Pain assessment: See objective evaluation for additional pain details     Objective   ADDITIONAL HISTORY:  Right hand dominant  Patient reports symptoms of pain, numbness, and tingling   Transportation: drives  Currently working in normal job without  restrictions    Functional Outcome Measure:       12/9/2024     7:32 AM   Upper Extremity Functional Index (  1996 [employer] [place])   a.  Any of your usual work, housework or school activities 3-A Little bit of Difficulty    b.  Your usual hobbies, recreational or sporting activities 1-Quite a bit of Difficulty    c.  Lifting a bag of groceries to waist level 4-No Difficulty    d.  Placing an object onto, or removing it from an overhead shelf 4-No Difficulty    e.  Washing your hair or scalp 4-No Difficulty    f.   Pushing up on your hands (e.g., from bathtub or chair) 4-No Difficulty    g.  Preparing food (e.g., peeling, cutting) 4-No Difficulty    h.  Driving 4-No Difficulty    I.   Vacuuming, sweeping, or raking 4-No Difficulty    j.   Dressing 4-No Difficulty    k.  Doing up buttons 4-No Difficulty    l.   Using tools or appliances 4-No Difficulty    m. Opening doors 4-No Difficulty    n.  Cleaning 4-No Difficulty    o.  Tying or lacing shoes 4-No Difficulty    p.  Sleeping 1-Quite a bit of Difficulty    q.  Laundering clothes. (e.g., washing, ironing, folding) 4-No Difficulty    r.   Opening a jar 4-No Difficulty    s.  Throwing a ball 4-No Difficulty    t.   Carrying a small suitcase with your affected limb  4-No Difficulty    Column Totals: /80 73        Patient-reported        PAIN:  Pain Level at Rest: 4/10  Pain Level with Use: 7/10  Pain Location: elbow, index finger, long finger, and ring finger  Pain Quality: Burning and Tingling  Pain Frequency: intermittent  Pain is Worst: nighttime  Pain is Exacerbated By: sleep  Pain is Relieved By: splinting  Pain Progression: Worsened        EDEMA:  DWC: 15.5 bilaterally          SENSATION: WNL throughout all nerve distributions; per patient report, Decreased Median Nerve distribution per pt report, Intermittently, and While Sleeping     SCREENS:  general screen - neg , however she said she will get a  pinch in her neck where she will feel it into her left elbow and  fingers. This is occasional- maybe 1x/week     ROM:  generally ROM is WNL- End range flexions positions can trigger symptoms    SPECIAL TESTS:   CTS Special Tests  Pain Report Left Right   Median Nerve Compression at Pronator - Symptoms in small finger   Carpal Compression Test-Durkan Test (30 sec) + right away -+ right away   Pedro Test for Lumbrical Incursion (fist x 30 sec) + on right -   Tinel's at Carpal Tunnel - -   Phalen's Sign More ulnar hand symptoms after a minute + right away     Ulnar Nerve Special Tests  Pain Report Left Right   Tinel's Cubital Tunnel - -   Tinel's Guyon's Canal - -   Elbow Flexion Test Pos after 10 but more medial side Pos after 40 ulnar side fingers   Ulnar N Subluxation at Elbow - -   Froment's Test - -       NEURAL TENSION TESTING: MNT: Median Neurodynamic Test (based on DS Montoya's ULNT)   12/9/2024   0-5 Scale 2 on left   Position:   0/5: Arm across abdomen in coronal plane  1/5: Depress shoulder, ER to neutral ABD shoulder to 45 degrees  2/5: ER shoulder to end range, keep elbow at 90 degrees  3/5: Extend elbow to 0 degrees  4/5: Fully supinate forearm  5/5: Extend wrist, fingers and thumb  Notes:  (+) indicates beyond grade level but less than retirement to next level  (-) indicates over retirement to level  S1 onset/change of patient's symptoms  S2 definite stop point based on patient's discomfort level    UNT: Ulnar Neurodynamic Test (based on DS Montoya's ULNT)   12/9/2024   0-5 Scale 2 on left   Position:   0/5: Arm across abdomen in coronal plane  1/5: ER to neutral ABD shoulder to 45 degrees  2/5: ER shoulder to 90 degrees  3/5: Block shoulder and ABD shoulder to 110 degrees  4/5: Fully pronate forearm, extend wrist and ring and small fingers  5/5: Flex elbow and bring hand to side of face  Notes:  (+) indicates beyond grade level but less than retirement to next level  (-) indicates over retirement to level  S1 onset/change of patient's symptoms  S2 definite stop point based on  patient's discomfort level        STRENGTH:     Measured in pounds 12/9/2024 12/9/2024    Left Right   Average 52 42     Lateral Pinch  Measured in pounds 12/9/2024 12/9/2024    Left Right   Average 11 13     3 Point Pinch  Measured in pounds 12/9/2024 12/9/2024    Left Right   Average 12 14              Assessment & Plan   CLINICAL IMPRESSIONS  Medical Diagnosis: Bilateral Carpal Tunnel Syndrome    Treatment Diagnosis: Numbness and tingling affecting sleep    Impression/Assessment: Pt is a 39 year old female presenting to Occupational Therapy due to above diagnoses.  Patient positive for both ulnar nerve and median nerve involvement.The following significant findings have been identified: Impaired sensation and Pain.  These identified deficits interfere with their ability to perform self care tasks  ( sleeping)as compared to previous level of function.     Clinical Decision Making (Complexity):  Assessment of Occupational Performance: 1-3 Performance Deficits  Occupational Performance Limitations: sleep  Clinical Decision Making (Complexity): Low complexity    PLAN OF CARE  Treatment Interventions:  Therapeutic Exercise:  AROM and Tendon Gliding  Neuromuscular re-education:  Nerve Gliding  Manual Techniques:  Myofascial release  Orthotic Fabrication:  Static, Forearm based, and Long arm  Self Care:  Self Care Tasks, Ergonomic Considerations, and Work Tasks    Long Term Goals   OT Goal 1  Goal Identifier: home program  Goal Description: Patient to be independent with HEP not needing more than 15% cuing  Rationale: In order to maximize safety and independence with ADL/IADLs  Target Date: 02/03/25  OT Goal 2  Goal Identifier: night splinting  Goal Description: Sb full time night splinting- alternating elbow and wrist bilaterally  Rationale: In order to maximize safety and independence with ADL/IADLs  Goal Progress: w+6      Frequency of Treatment: 2x/month  Duration of Treatment: 2 months     Recommended  Referrals to Other Professionals:   Education Assessment: Learner/Method: Patient;Listening;Demonstration;Reading;Pictures/Video;No Barriers to Learning  Education Comments: PTRX put on phone     Risks and benefits of evaluation/treatment have been explained.   Patient/Family/caregiver agrees with Plan of Care.     Evaluation Time:    GUADALUPE Newman Low Complexity Minutes (16932): 25       Signing Clinician:HUEY Grossman/SARAH CHT  Occupational Therapist, Certified Hand Therapist         [facility]                                                                                   OUTPATIENT OCCUPATIONAL THERAPY      PLAN OF TREATMENT FOR OUTPATIENT REHABILITATION   Patient's Last Name, First Name, JOSHEMILY RodríguezSuzy  ISABEL YOB: 1985   Provider's Name   [facility]   Medical Record No.  [id number]     Onset Date: 11/09/24 Start of Care Date: 12/09/24     Medical Diagnosis:  Bilateral Carpal Tunnel Syndrome      OT Treatment Diagnosis:  Numbness and tingling affecting sleep Plan of Treatment  Frequency/Duration:2x/month/2 months    Certification date from 12/09/24   To 12/23/24        See note for plan of treatment details and functional goals     Nilsa Amaya OT                         I CERTIFY THE NEED FOR THESE SERVICES FURNISHED UNDER        THIS PLAN OF TREATMENT AND WHILE UNDER MY CARE     (Physician attestation of this document indicates review and certification of the therapy plan).              Referring Provider:  Breezy Moreno    Initial Assessment  See Epic Evaluation- 12/09/24

## 2024-12-11 PROBLEM — G56.03 BILATERAL CARPAL TUNNEL SYNDROME: Status: ACTIVE | Noted: 2024-12-11

## 2024-12-17 ENCOUNTER — MYC REFILL (OUTPATIENT)
Dept: ENDOCRINOLOGY | Facility: CLINIC | Age: 39
End: 2024-12-17
Payer: COMMERCIAL

## 2024-12-17 DIAGNOSIS — E03.8 SECONDARY HYPOTHYROIDISM: ICD-10-CM

## 2024-12-17 RX ORDER — LEVOTHYROXINE SODIUM 75 UG/1
75 TABLET ORAL DAILY
Qty: 90 TABLET | Refills: 2 | Status: SHIPPED | OUTPATIENT
Start: 2024-12-17

## 2025-03-02 ENCOUNTER — HEALTH MAINTENANCE LETTER (OUTPATIENT)
Age: 40
End: 2025-03-02

## 2025-03-06 ENCOUNTER — TRANSFERRED RECORDS (OUTPATIENT)
Dept: HEALTH INFORMATION MANAGEMENT | Facility: CLINIC | Age: 40
End: 2025-03-06
Payer: COMMERCIAL

## 2025-03-10 NOTE — PROGRESS NOTES
Internal Medicine    Patient ID: [id number]     Chief Complaint: Annual Exam      HPI:     Uziel Lopez is a 66 y.o. male here today for a wellness    Patient reports a rash previously diagnosed as eczema by Dr. Puckett. The rash is located on multiple areas of the body, including the legs, and is intensely pruritic. The rash progression begins with a red spot that develops into a tiny pimple if left unscratched. When ruptured, the pimple forms a yellow crater. Scratching has become habitual due to the intense pruritus.    Patient had a severe sinus infection 2-3 weeks ago, requiring two evaluations at a walk-in clinic. The infection involved significant facial pressure, excessive mucus production, and coughing. Currently, he reports minimal residual coughing. Tests for COVID, pneumonia, flu, and strep during this episode were negative.    TEST RESULTS:  Patient's blood sugar test has not been completed. Patient underwent several tests 2-3 weeks ago, including COVID, pneumonia, flu, and strep tests. All results were negative.    IMAGING:  The lung cancer screening has not been completed yet.       History reviewed. No pertinent past medical history.     Past Surgical History:   Procedure Laterality Date    CHOLECYSTECTOMY  2020    EYE SURGERY  2013    SPINE SURGERY  2017    VASECTOMY  1984        Social History     Tobacco Use    Smoking status: Every Day     Current packs/day: 2.00     Average packs/day: 2.0 packs/day for 9.2 years (18.4 ttl pk-yrs)     Types: Cigarettes     Start date: 1/1/2016    Smokeless tobacco: Never   Substance and Sexual Activity    Alcohol use: Never    Drug use: Never    Sexual activity: Yes     Partners: Female        Current Outpatient Medications   Medication Instructions    doxycycline (VIBRAMYCIN) 100 mg, Oral, Every 12 hours    telmisartan (MICARDIS) 40 mg, Oral, Daily       Review of patient's allergies indicates:  No Known Allergies     Patient Care Team:  [facility]    MY COPING PLAN FOR SAFETY    PATIENT'S NAME: Suzy Rodríguez  MRN:   [id number]  SAFETY PLAN:  Step 1: Warning signs / cues (Thoughts, images, mood, situation, behavior) that a crisis may be developing:    Thoughts: \"People would be better off without me\"    Thinking Processes: ruminations (can't stop thinking about my problems): think about dying from cancer    Mood: worsening depression, hopelessness and helplessness    Behaviors: isolating/withdrawing  and not taking care of myself    Situations: relationship problems, medical condition / diagnosis: cancer and stress of kids home from school   Step 2: Coping strategies - Things I can do to take my mind off of my problems without contacting another person (relaxation technique, physical activity):    Distress Tolerance Strategies:  needs to identify and reingage with hobbies    Physical Activities: bike ride    Focus on helpful thoughts:  none current  Step 3: People  that provide distraction:   Name: sister   Step 4: Remind myself of people and things that are important to me and worth living for:  family  Step 5: When I am in crisis, I can ask these people to help me use my safety plan:   Name:  SisterAshvin--   Step 6: Making the environment safe:     be around others  Step 7: Professionals or agencies I can contact during a crisis:    Suicide Prevention Lifeline: 6-671-003-NUDX (9300)    Crisis Text Line Service (available 24 hours a day, 7 days a week): Text [place] to 190041    Call  **CRISIS (927757) from a cell phone to talk to a team of professionals who can help you.  Crisis Services [facility]: Phone Number:   [place]     [phone]   [place]    [phone]   [place]    [phone]   [place]    [phone]   [place]    [phone]   [place] [phone]   [place]     [phone]       Call 911 or go to my nearest emergency department.     I helped develop this safety plan and agree to use it when " "Fly ONEAL MD as PCP - General (Internal Medicine)  Drew Arias MD as Consulting Physician (Cardiology)     Subjective:     Review of Systems    12 point review of systems conducted, negative except as stated in the history of present illness. See HPI for details.    Objective:     Visit Vitals  /84 (BP Location: Left arm, Patient Position: Sitting)   Pulse 63   Temp 97.6 °F (36.4 °C) (Temporal)   Resp 16   Ht 5' 10" (1.778 m)   Wt 73.9 kg (163 lb)   SpO2 99%   BMI 23.39 kg/m²       Physical Exam  Constitutional:       Appearance: Normal appearance.   HENT:      Head: Normocephalic and atraumatic.   Eyes:      Extraocular Movements: Extraocular movements intact.      Pupils: Pupils are equal, round, and reactive to light.   Cardiovascular:      Rate and Rhythm: Normal rate and regular rhythm.   Pulmonary:      Effort: Pulmonary effort is normal.      Breath sounds: Normal breath sounds.   Abdominal:      General: Bowel sounds are normal.      Palpations: Abdomen is soft.   Musculoskeletal:         General: Normal range of motion.   Skin:     General: Skin is warm and dry.      Findings: Rash present.   Neurological:      General: No focal deficit present.      Mental Status: He is alert.   Psychiatric:         Mood and Affect: Mood normal.         Labs Reviewed:     Chemistry:  Lab Results   Component Value Date     09/06/2024    K 4.0 09/06/2024    BUN 5.1 (L) 09/06/2024    CREATININE 0.86 09/06/2024    EGFRNORACEVR >60 09/06/2024    GLUCOSE 134 (H) 09/06/2024    CALCIUM 9.6 09/06/2024    ALKPHOS 89 09/06/2024    LABPROT 6.6 09/06/2024    ALBUMIN 4.1 09/06/2024    BILIDIR 0.2 04/01/2022    IBILI 0.20 04/01/2022    AST 14 09/06/2024    ALT 11 09/06/2024    TSH 2.9934 07/07/2021    PSA 1.26 01/09/2023        Lab Results   Component Value Date    HGBA1C 5.6 06/22/2020        Hematology:  Lab Results   Component Value Date    WBC 9.5 01/09/2023    HGB 14.7 01/09/2023    HCT 44.2 01/09/2023     " needed.  I have been given a copy of this plan.      Client signature _________________________________________________________________  Today s date:  5/15/2020  Adapted from Safety Plan Template 2008 Jeanne Dupont and Jordan Lynne is reprinted with the express permission of the authors.  No portion of the Safety Plan Template may be reproduced without the express, written permission.  You can contact the authors at [email or web address] or [email or web address].                             01/09/2023       Lipid Panel:  Lab Results   Component Value Date    CHOL 120 09/06/2024    HDL 42 09/06/2024    LDL 64.00 09/06/2024    TRIG 69 09/06/2024    TOTALCHOLEST 3 09/06/2024        Urine:  Lab Results   Component Value Date    APPEARANCEUA Clear 01/09/2023    SGUA 1.009 01/09/2023    PROTEINUA Negative 01/09/2023    KETONESUA Negative 01/09/2023    LEUKOCYTESUR Negative 01/09/2023    RBCUA <5 01/09/2023    WBCUA <5 01/09/2023    BACTERIA None Seen 01/09/2023        Assessment and Plan:     Assessment & Plan    I10 Primary hypertension  F17.200 Tobacco dependence  I70.0 Atherosclerosis of aorta  E78.00 Pure hypercholesterolemia  L08.9, B95.8 Staph skin infection  Z00.00 Well adult exam      IMPRESSION:   Assessed rash, considering differential diagnosis between eczema (previously diagnosed by Dr. Puckett) and bacterial infection (possibly staph or strep)   Evaluated effectiveness of current treatment and patient's symptoms   Considered occupational exposure to environmental contaminants as potential contributing factor to skin condition and respiratory issues   Reassessed blood pressure medication needs based on current readings and patient's symptoms    I10 PRIMARY HYPERTENSION:  - Decreased blood pressure medication from 80mg to 40mg.  - Discontinued fluid pill component of blood pressure medication.    F17.200 TOBACCO DEPENDENCE:  - Discussed importance of smoking cessation for overall health and advised the patient to quit smoking.  - Ordered lung cancer screening.    E78.00 PURE HYPERCHOLESTEROLEMIA:  - Ordered labs.  - Scheduled follow-up after completing labs to check blood sugar.    L08.9, B95.8 STAPH SKIN INFECTION:  - Evaluated the rash and suspected a bacterial infection rather than eczema.  - Patient reports having a rash that itches and forms craters, starting as a red spot, turning into a tiny pimple, and then forming a yellow crater when popped.  - Assessed that the condition might be related  to the patient's work environment, which includes exposure to various potential contaminants.  - Prescribed doxycycline for 2 weeks and instructed the patient to use antibacterial Dial soap for bathing.  - Scheduled follow-up as needed if rash does not improve with doxycycline treatment.           Follow up in about 6 months (around 9/10/2025). In addition to their scheduled follow up, the patient has also been instructed to follow up on as needed basis.     Future Appointments   Date Time Provider Department Center   9/10/2025  3:00 PM Fly Almeida MD [facility]        Fly Almeida MD

## 2025-03-25 ENCOUNTER — OFFICE VISIT (OUTPATIENT)
Dept: FAMILY MEDICINE | Facility: CLINIC | Age: 40
End: 2025-03-25
Payer: COMMERCIAL

## 2025-03-25 VITALS
BODY MASS INDEX: 26.87 KG/M2 | OXYGEN SATURATION: 98 % | HEIGHT: 64 IN | DIASTOLIC BLOOD PRESSURE: 68 MMHG | WEIGHT: 157.38 LBS | HEART RATE: 83 BPM | SYSTOLIC BLOOD PRESSURE: 110 MMHG | TEMPERATURE: 97.8 F | RESPIRATION RATE: 16 BRPM

## 2025-03-25 DIAGNOSIS — E27.1 PRIMARY ADRENAL INSUFFICIENCY (H): ICD-10-CM

## 2025-03-25 DIAGNOSIS — C74.01 MALIGNANT NEOPLASM OF CORTEX OF RIGHT ADRENAL GLAND (H): ICD-10-CM

## 2025-03-25 DIAGNOSIS — R74.8 ELEVATED ALKALINE PHOSPHATASE LEVEL: ICD-10-CM

## 2025-03-25 DIAGNOSIS — Z00.00 HEALTHCARE MAINTENANCE: Primary | ICD-10-CM

## 2025-03-25 DIAGNOSIS — F41.9 ANXIETY: ICD-10-CM

## 2025-03-25 DIAGNOSIS — C7B.8 OTHER SECONDARY NEUROENDOCRINE TUMORS (H): ICD-10-CM

## 2025-03-25 DIAGNOSIS — G47.33 OSA (OBSTRUCTIVE SLEEP APNEA): ICD-10-CM

## 2025-03-25 DIAGNOSIS — Z23 ENCOUNTER FOR IMMUNIZATION: ICD-10-CM

## 2025-03-25 PROBLEM — F33.1 MAJOR DEPRESSIVE DISORDER, RECURRENT EPISODE, MODERATE WITH ANXIOUS DISTRESS (H): Status: RESOLVED | Noted: 2020-05-19 | Resolved: 2025-03-25

## 2025-03-25 PROBLEM — D68.9 COAGULATION DISORDER: Status: ACTIVE | Noted: 2021-01-28

## 2025-03-25 LAB
ALBUMIN SERPL BCG-MCNC: 4.2 G/DL (ref 3.5–5.2)
ALP SERPL-CCNC: 98 U/L (ref 40–150)
ALT SERPL W P-5'-P-CCNC: 23 U/L (ref 0–50)
ANION GAP SERPL CALCULATED.3IONS-SCNC: 12 MMOL/L (ref 7–15)
AST SERPL W P-5'-P-CCNC: 26 U/L (ref 0–45)
BASOPHILS # BLD AUTO: 0 10E3/UL (ref 0–0.2)
BASOPHILS NFR BLD AUTO: 0 %
BILIRUB SERPL-MCNC: 0.3 MG/DL
BUN SERPL-MCNC: 11.6 MG/DL (ref 6–20)
CALCIUM SERPL-MCNC: 9.1 MG/DL (ref 8.8–10.4)
CHLORIDE SERPL-SCNC: 102 MMOL/L (ref 98–107)
CREAT SERPL-MCNC: 0.78 MG/DL (ref 0.51–0.95)
EGFRCR SERPLBLD CKD-EPI 2021: >90 ML/MIN/1.73M2
EOSINOPHIL # BLD AUTO: 0.1 10E3/UL (ref 0–0.7)
EOSINOPHIL NFR BLD AUTO: 2 %
ERYTHROCYTE [DISTWIDTH] IN BLOOD BY AUTOMATED COUNT: 12.9 % (ref 10–15)
EST. AVERAGE GLUCOSE BLD GHB EST-MCNC: 91 MG/DL
GLUCOSE SERPL-MCNC: 86 MG/DL (ref 70–99)
HBA1C MFR BLD: 4.8 % (ref 0–5.6)
HCO3 SERPL-SCNC: 27 MMOL/L (ref 22–29)
HCT VFR BLD AUTO: 42.4 % (ref 35–47)
HGB BLD-MCNC: 14 G/DL (ref 11.7–15.7)
IMM GRANULOCYTES # BLD: 0 10E3/UL
IMM GRANULOCYTES NFR BLD: 0 %
LDH SERPL L TO P-CCNC: 155 U/L (ref 0–250)
LYMPHOCYTES # BLD AUTO: 1.2 10E3/UL (ref 0.8–5.3)
LYMPHOCYTES NFR BLD AUTO: 25 %
MCH RBC QN AUTO: 30.6 PG (ref 26.5–33)
MCHC RBC AUTO-ENTMCNC: 33 G/DL (ref 31.5–36.5)
MCV RBC AUTO: 93 FL (ref 78–100)
MONOCYTES # BLD AUTO: 0.4 10E3/UL (ref 0–1.3)
MONOCYTES NFR BLD AUTO: 8 %
NEUTROPHILS # BLD AUTO: 3.1 10E3/UL (ref 1.6–8.3)
NEUTROPHILS NFR BLD AUTO: 64 %
PLATELET # BLD AUTO: 200 10E3/UL (ref 150–450)
POTASSIUM SERPL-SCNC: 4.2 MMOL/L (ref 3.4–5.3)
PROT SERPL-MCNC: 6.2 G/DL (ref 6.4–8.3)
RBC # BLD AUTO: 4.57 10E6/UL (ref 3.8–5.2)
SODIUM SERPL-SCNC: 141 MMOL/L (ref 135–145)
TSH SERPL DL<=0.005 MIU/L-ACNC: 2.78 UIU/ML (ref 0.3–4.2)
WBC # BLD AUTO: 4.9 10E3/UL (ref 4–11)

## 2025-03-25 PROCEDURE — 80053 COMPREHEN METABOLIC PANEL: CPT | Performed by: FAMILY MEDICINE

## 2025-03-25 PROCEDURE — 86316 IMMUNOASSAY TUMOR OTHER: CPT | Mod: 90 | Performed by: FAMILY MEDICINE

## 2025-03-25 PROCEDURE — 36415 COLL VENOUS BLD VENIPUNCTURE: CPT | Performed by: FAMILY MEDICINE

## 2025-03-25 PROCEDURE — 84443 ASSAY THYROID STIM HORMONE: CPT | Performed by: FAMILY MEDICINE

## 2025-03-25 PROCEDURE — 82024 ASSAY OF ACTH: CPT | Performed by: FAMILY MEDICINE

## 2025-03-25 PROCEDURE — 3078F DIAST BP <80 MM HG: CPT | Performed by: FAMILY MEDICINE

## 2025-03-25 PROCEDURE — 85025 COMPLETE CBC W/AUTO DIFF WBC: CPT | Performed by: FAMILY MEDICINE

## 2025-03-25 PROCEDURE — 83615 LACTATE (LD) (LDH) ENZYME: CPT | Performed by: FAMILY MEDICINE

## 2025-03-25 PROCEDURE — 82627 DEHYDROEPIANDROSTERONE: CPT | Performed by: FAMILY MEDICINE

## 2025-03-25 PROCEDURE — 83036 HEMOGLOBIN GLYCOSYLATED A1C: CPT | Performed by: FAMILY MEDICINE

## 2025-03-25 PROCEDURE — 90471 IMMUNIZATION ADMIN: CPT | Performed by: FAMILY MEDICINE

## 2025-03-25 PROCEDURE — 99000 SPECIMEN HANDLING OFFICE-LAB: CPT | Performed by: FAMILY MEDICINE

## 2025-03-25 PROCEDURE — 3074F SYST BP LT 130 MM HG: CPT | Performed by: FAMILY MEDICINE

## 2025-03-25 PROCEDURE — 99396 PREV VISIT EST AGE 40-64: CPT | Mod: 25 | Performed by: FAMILY MEDICINE

## 2025-03-25 PROCEDURE — 90677 PCV20 VACCINE IM: CPT | Performed by: FAMILY MEDICINE

## 2025-03-25 RX ORDER — BUPROPION HYDROCHLORIDE 100 MG/1
1 TABLET ORAL
COMMUNITY
Start: 2025-01-21

## 2025-03-25 RX ORDER — FLUOXETINE HYDROCHLORIDE 40 MG/1
40 CAPSULE ORAL DAILY
COMMUNITY
Start: 2025-02-26

## 2025-03-25 RX ORDER — DEXTROAMPHETAMINE SACCHARATE, AMPHETAMINE ASPARTATE, DEXTROAMPHETAMINE SULFATE AND AMPHETAMINE SULFATE 1.25; 1.25; 1.25; 1.25 MG/1; MG/1; MG/1; MG/1
TABLET ORAL
COMMUNITY
Start: 2025-02-22

## 2025-03-25 SDOH — HEALTH STABILITY: PHYSICAL HEALTH: ON AVERAGE, HOW MANY MINUTES DO YOU ENGAGE IN EXERCISE AT THIS LEVEL?: 20 MIN

## 2025-03-25 SDOH — HEALTH STABILITY: PHYSICAL HEALTH: ON AVERAGE, HOW MANY DAYS PER WEEK DO YOU ENGAGE IN MODERATE TO STRENUOUS EXERCISE (LIKE A BRISK WALK)?: 3 DAYS

## 2025-03-25 ASSESSMENT — PATIENT HEALTH QUESTIONNAIRE - PHQ9
10. IF YOU CHECKED OFF ANY PROBLEMS, HOW DIFFICULT HAVE THESE PROBLEMS MADE IT FOR YOU TO DO YOUR WORK, TAKE CARE OF THINGS AT HOME, OR GET ALONG WITH OTHER PEOPLE: VERY DIFFICULT
SUM OF ALL RESPONSES TO PHQ QUESTIONS 1-9: 15
SUM OF ALL RESPONSES TO PHQ QUESTIONS 1-9: 15

## 2025-03-25 ASSESSMENT — SOCIAL DETERMINANTS OF HEALTH (SDOH): HOW OFTEN DO YOU GET TOGETHER WITH FRIENDS OR RELATIVES?: ONCE A WEEK

## 2025-03-25 NOTE — PATIENT INSTRUCTIONS
Patient Education   Preventive Care Advice   This is general advice given by our system to help you stay healthy. However, your care team may have specific advice just for you. Please talk to your care team about your preventive care needs.  Nutrition  Eat 5 or more servings of fruits and vegetables each day.  Try wheat bread, brown rice and whole grain pasta (instead of white bread, rice, and pasta).  Get enough calcium and vitamin D. Check the label on foods and aim for 100% of the RDA (recommended daily allowance).  Lifestyle  Exercise at least 150 minutes each week  (30 minutes a day, 5 days a week).  Do muscle strengthening activities 2 days a week. These help control your weight and prevent disease.  No smoking.  Wear sunscreen to prevent skin cancer.  Have a dental exam and cleaning every 6 months.  Yearly exams  See your health care team every year to talk about:  Any changes in your health.  Any medicines your care team has prescribed.  Preventive care, family planning, and ways to prevent chronic diseases.  Shots (vaccines)   HPV shots (up to age 26), if you've never had them before.  Hepatitis B shots (up to age 59), if you've never had them before.  COVID-19 shot: Get this shot when it's due.  Flu shot: Get a flu shot every year.  Tetanus shot: Get a tetanus shot every 10 years.  Pneumococcal, hepatitis A, and RSV shots: Ask your care team if you need these based on your risk.  Shingles shot (for age 50 and up)  General health tests  Diabetes screening:  Starting at age 35, Get screened for diabetes at least every 3 years.  If you are younger than age 35, ask your care team if you should be screened for diabetes.  Cholesterol test: At age 39, start having a cholesterol test every 5 years, or more often if advised.  Bone density scan (DEXA): At age 50, ask your care team if you should have this scan for osteoporosis (brittle bones).  Hepatitis C: Get tested at least once in your life.  STIs (sexually  transmitted infections)  Before age 24: Ask your care team if you should be screened for STIs.  After age 24: Get screened for STIs if you're at risk. You are at risk for STIs (including HIV) if:  You are sexually active with more than one person.  You don't use condoms every time.  You or a partner was diagnosed with a sexually transmitted infection.  If you are at risk for HIV, ask about PrEP medicine to prevent HIV.  Get tested for HIV at least once in your life, whether you are at risk for HIV or not.  Cancer screening tests  Cervical cancer screening: If you have a cervix, begin getting regular cervical cancer screening tests starting at age 21.  Breast cancer scan (mammogram): If you've ever had breasts, begin having regular mammograms starting at age 40. This is a scan to check for breast cancer.  Colon cancer screening: It is important to start screening for colon cancer at age 45.  Have a colonoscopy test every 10 years (or more often if you're at risk) Or, ask your provider about stool tests like a FIT test every year or Cologuard test every 3 years.  To learn more about your testing options, visit:   .  For help making a decision, visit:   https://bit.ly/zi51498.  Prostate cancer screening test: If you have a prostate, ask your care team if a prostate cancer screening test (PSA) at age 55 is right for you.  Lung cancer screening: If you are a current or former smoker ages 50 to 80, ask your care team if ongoing lung cancer screenings are right for you.  For informational purposes only. Not to replace the advice of your health care provider. Copyright   2023 [facility]. All rights reserved. Clinically reviewed by the [facility]. bubl 876184 - REV 01/24.  Learning About Stress  What is stress?     Stress is your body's response to a hard situation. Your body can have a physical, emotional, or mental response. Stress is a fact of life for most people, and it  affects everyone differently. What causes stress for you may not be stressful for someone else.  A lot of things can cause stress. You may feel stress when you go on a job interview, take a test, or run a race. This kind of short-term stress is normal and even useful. It can help you if you need to work hard or react quickly. For example, stress can help you finish an important job on time.  Long-term stress is caused by ongoing stressful situations or events. Examples of long-term stress include long-term health problems, ongoing problems at work, or conflicts in your family. Long-term stress can harm your health.  How does stress affect your health?  When you are stressed, your body responds as though you are in danger. It makes hormones that speed up your heart, make you breathe faster, and give you a burst of energy. This is called the fight-or-flight stress response. If the stress is over quickly, your body goes back to normal and no harm is done.  But if stress happens too often or lasts too long, it can have bad effects. Long-term stress can make you more likely to get sick, and it can make symptoms of some diseases worse. If you tense up when you are stressed, you may develop neck, shoulder, or low back pain. Stress is linked to high blood pressure and heart disease.  Stress also harms your emotional health. It can make you barfield, tense, or depressed. Your relationships may suffer, and you may not do well at work or school.  What can you do to manage stress?  You can try these things to help manage stress:   Do something active. Exercise or activity can help reduce stress. Walking is a great way to get started. Even everyday activities such as housecleaning or yard work can help.  Try yoga or jay chi. These techniques combine exercise and meditation. You may need some training at first to learn them.  Do something you enjoy. For example, listen to music or go to a movie. Practice your hobby or do volunteer  "work.  Meditate. This can help you relax, because you are not worrying about what happened before or what may happen in the future.  Do guided imagery. Imagine yourself in any setting that helps you feel calm. You can use online videos, books, or a teacher to guide you.  Do breathing exercises. For example:  From a standing position, bend forward from the waist with your knees slightly bent. Let your arms dangle close to the floor.  Breathe in slowly and deeply as you return to a standing position. Roll up slowly and lift your head last.  Hold your breath for just a few seconds in the standing position.  Breathe out slowly and bend forward from the waist.  Let your feelings out. Talk, laugh, cry, and express anger when you need to. Talking with supportive friends or family, a counselor, or a vaughn leader about your feelings is a healthy way to relieve stress. Avoid discussing your feelings with people who make you feel worse.  Write. It may help to write about things that are bothering you. This helps you find out how much stress you feel and what is causing it. When you know this, you can find better ways to cope.  What can you do to prevent stress?  You might try some of these things to help prevent stress:  Manage your time. This helps you find time to do the things you want and need to do.  Get enough sleep. Your body recovers from the stresses of the day while you are sleeping.  Get support. Your family, friends, and community can make a difference in how you experience stress.  Limit your news feed. Avoid or limit time on social media or news that may make you feel stressed.  Do something active. Exercise or activity can help reduce stress. Walking is a great way to get started.  Where can you learn more?  Go to https://www.[employer].net/patiented  Enter N032 in the search box to learn more about \"Learning About Stress.\"  Current as of: October 24, 2024  Content Version: 14.4 2024-2025 [employer] " [employer].   Care instructions adapted under license by your healthcare professional. If you have questions about a medical condition or this instruction, always ask your healthcare professional. [employer] disclaims any warranty or liability for your use of this information.    Learning About Depression Screening  What is depression screening?  Depression screening is a way to see if you have depression symptoms. It may be done by a doctor or counselor. It's often part of a routine checkup. That's because your mental health is just as important as your physical health.  Depression is a mental health condition that affects how you feel, think, and act. You may:  Have less energy.  Lose interest in your daily activities.  Feel sad and grouchy for a long time.  Depression is very common. It affects people of all ages.  Many things can lead to depression. Some people become depressed after they have a stroke or find out they have a major illness like cancer or heart disease. The death of a loved one or a breakup may lead to depression. It can run in families. Most experts believe that a combination of inherited genes and stressful life events can cause it.  What happens during screening?  You may be asked to fill out a form about your depression symptoms. You and the doctor will discuss your answers. The doctor may ask you more questions to learn more about how you think, act, and feel.  What happens after screening?  If you have symptoms of depression, your doctor will talk to you about your options.  Doctors usually treat depression with medicines or counseling. Often, combining the two works best. Many people don't get help because they think that they'll get over the depression on their own. But people with depression may not get better unless they get treatment.  The cause of depression is not well understood. There may be many factors involved. But if you have depression, it's not your fault.  A serious  "symptom of depression is thinking about death or suicide. If you or someone you care about talks about this or about feeling hopeless, get help right away.  It's important to know that depression can be treated. Medicine, counseling, and self-care may help.  Where can you learn more?  Go to https://www.[employer].net/patiented  Enter T185 in the search box to learn more about \"Learning About Depression Screening.\"  Current as of: July 31, 2024  Content Version: 14.4    2386-6323 [employer].   Care instructions adapted under license by your healthcare professional. If you have questions about a medical condition or this instruction, always ask your healthcare professional. [employer] disclaims any warranty or liability for your use of this information.       "

## 2025-03-25 NOTE — PROGRESS NOTES
"Preventive Care Visit  [facility] VICENTE Leal MD, Family Medicine  Mar 25, 2025      Assessment & Plan     Healthcare maintenance    Encounter for immunization  Pneumonia vaccine given today.  Patient is cueing for shingles vaccine as well however I am unable to order this due to the system stating that she is on Medicare.  She is going to be coming back next month for her IUD replacement and we will plan on getting it at this time.  - Pneumococcal 20 Valent Conjugate (Prevnar 20)    Malignant neoplasm of cortex of right adrenal gland (H)  Continue to follow with endocrine/oncology  - CBC with Platelets & Differential  - Comprehensive metabolic panel  - Lactate Dehydrogenase  - Adrenal corticotropin  - Chromogranin A  - DHEA sulfate  - TSH with free T4 reflex    Elevated alkaline phosphatase level  Ordered by oncology  - CBC with Platelets & Differential  - Comprehensive metabolic panel  - Lactate Dehydrogenase  - Adrenal corticotropin  - Chromogranin A  - DHEA sulfate  - TSH with free T4 reflex    Anxiety  Patient feels as though she is stable at this time.    SHREYA (obstructive sleep apnea)  Ordered by oncology  - CBC with Platelets & Differential  - Comprehensive metabolic panel  - Lactate Dehydrogenase  - Adrenal corticotropin  - Chromogranin A  - DHEA sulfate  - TSH with free T4 reflex    Other secondary neuroendocrine tumors (H)  Ordered by neurology  - Chromogranin A    Primary adrenal insufficiency (H)  Continue with endocrinology    Patient has been advised of split billing requirements and indicates understanding: Yes        BMI  Estimated body mass index is 26.87 kg/m  as calculated from the following:    Height as of this encounter: 1.63 m (5' 4.17\").    Weight as of this encounter: 71.4 kg (157 lb 6 oz).   Weight management plan: Patient referred to endocrine and/or weight management specialty    Counseling  Appropriate preventive services were addressed with this patient " via screening, questionnaire, or discussion as appropriate for fall prevention, nutrition, physical activity, Tobacco-use cessation, social engagement, weight loss and cognition.  Checklist reviewing preventive services available has been given to the patient.  Reviewed patient's diet, addressing concerns and/or questions.   She is at risk for lack of exercise and has been provided with information to increase physical activity for the benefit of her well-being.   She is at risk for psychosocial distress and has been provided with information to reduce risk.   The patient's PHQ-9 score is consistent with moderate depression. She was provided with information regarding depression.           Bebo Almonte is a 39 year old, presenting for the following:  Medicare Visit (AWV-Px/Not fasting for labs today)        3/25/2025    10:35 AM   Additional Questions   Roomed by Fannie Phan CMA   Accompanied by self           HPI  Patient is a very pleasant 39-year-old female with a past medical history significant for malignant neoplasm of cerebral cortex of right adrenal gland currently very well who presents to the clinic today for complete physical examination.    She is overall doing well.  She has no specific questions or concerns.  She notes that her weight has increased a bit (about 10 pounds in the last 2 years).  She does not follow any specific diet.  She does not do anything in particular for physical activity.  Her  is on Zepbound and she wonders about trialing a medication such as this.    Otherwise, she has an IUD which was placed in January 2019 for heavy menstrual cycles.  She believes that she would like this switched out given that her cycles are beginning to come back.  She has not had a vaginal delivery but did have some cervical dilation in her first pregnancy.        Advance Care Planning  Patient does not have a Health Care Directive: Discussed advance care planning with patient;  however, patient declined at this time.      3/25/2025   General Health   How would you rate your overall physical health? (!) FAIR   Feel stress (tense, anxious, or unable to sleep) To some extent   (!) STRESS CONCERN      3/25/2025   Nutrition   Diet: Regular (no restrictions)         3/25/2025   Exercise   Days per week of moderate/strenous exercise 3 days   Average minutes spent exercising at this level 20 min         3/25/2025   Social Factors   Frequency of gathering with friends or relatives Once a week   Worry food won't last until get money to buy more No   Food not last or not have enough money for food? No   Do you have housing? (Housing is defined as stable permanent housing and does not include staying ouside in a car, in a tent, in an abandoned building, in an overnight shelter, or couch-surfing.) Yes   Are you worried about losing your housing? No   Lack of transportation? No   Unable to get utilities (heat,electricity)? No         3/25/2025   Fall Risk   Fallen 2 or more times in the past year? No   Trouble with walking or balance? No          3/25/2025   Activities of Daily Living- Home Safety   Needs help with the following daily activites None of the above   Safety concerns in the home None of the above         3/25/2025   Dental   Dentist two times every year? Yes         3/25/2025   Hearing Screening   Hearing concerns? None of the above         3/25/2025   Driving Risk Screening   Patient/family members have concerns about driving No         3/25/2025   General Alertness/Fatigue Screening   Have you been more tired than usual lately? No         3/25/2025   Urinary Incontinence Screening   Bothered by leaking urine in past 6 months No         Today's PHQ-9 Score:       3/25/2025    10:01 AM   PHQ-9 SCORE   PHQ-9 Total Score MyChart 15 (Moderately severe depression)   PHQ-9 Total Score 15        Patient-reported         3/25/2025   Substance Use   Alcohol more than 3/day or more than 7/wk No   Do  you have a current opioid prescription? No   How severe/bad is pain from 1 to 10? 0/10 (No Pain)   Do you use any other substances recreationally? No     Social History     Tobacco Use    Smoking status: Former    Smokeless tobacco: Never    Tobacco comments:     vapes marijuana   Vaping Use    Vaping status: Never Used   Substance Use Topics    Alcohol use: Yes     Comment: occ.    Drug use: Not Currently     Types: Marijuana           11/14/2024   LAST FHS-7 RESULTS   1st degree relative breast or ovarian cancer No   Any relative bilateral breast cancer No   Any male have breast cancer No   Any ONE woman have BOTH breast AND ovarian cancer No   Any woman with breast cancer before 50yrs No   2 or more relatives with breast AND/OR ovarian cancer No   2 or more relatives with breast AND/OR bowel cancer No        Mammogram Screening - Mammogram every 1-2 years updated in Health Maintenance based on mutual decision making      History of abnormal Pap smear: No - age 30- 64 PAP with HPV every 5 years recommended        Latest Ref Rng & Units 10/29/2021    11:39 AM 10/8/2015    11:29 AM   PAP / HPV   PAP  Negative for Intraepithelial Lesion or Malignancy (NILM)  Negative for squamous intraepithelial lesion or malignancy  Electronically signed by Faby Mccauley CT (ASCP) on 10/21/2015 at  1:55 PM      HPV 16 DNA Negative Negative     HPV 18 DNA Negative Negative     Other HR HPV Negative Negative       ASCVD Risk   The ASCVD Risk score (Linda DK, et al., 2019) failed to calculate for the following reasons:    The valid HDL cholesterol range is 20 to 100 mg/dL        Reviewed and updated as needed this visit by Provider                    Past Medical History:   Diagnosis Date    Adrenal cortex cancer, right (H) 6/30/2018    Resected 6/25/18, Dr. Mansfield.    Adrenal mass     Anemia     thought due to heavy menses.    Calculus of ureter 5/9/2018    Carcinoma, adrenal cortical (H) 7/18/2018    Chocolate cyst of  "ovary     Clotting disorder     Depression     GERD (gastroesophageal reflux disease)     History of miscarriage     Hypertension due to endocrine disorder 2018    Kidney stones     passed on their own    Malignant neoplasm of cortex of right adrenal gland (H) 2018    EOTD; 18.  Check in May. SCP started.  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    SHREYA on CPAP 2015    Stopped using CPAP the  after weight loss as even at lower pressure/adjustment couldn't tolerate the cpap. Resting well, cautioned to watch for any signs of fatigue and consider \"titration study\" in the future to see if cpap is still required at her new weight.    Pneumonia     hx of recurrent pneumonia issues/respiratory infections.    Polycystic ovary syndrome     able to get pregnant, not on meds.    Pulmonary embolism (H)  or so    briefly on wafarin.    Sleep apnea     cpap    Vitamin D deficiency      Past Surgical History:   Procedure Laterality Date    ADRENALECTOMY Right 2018    Procedure: ADRENALECTOMY;  Right Adrenalectomy,  Embolize Liver , Anesthesia Block;  Surgeon: Warren Mansfield MD;  Location: [facility] OR    ADRENALECTOMY      ADRENALECTOMY Right      SECTION      twice     SECTION      2     SECTION      x2    COLONOSCOPY N/A 3/11/2024    Procedure: Colonoscopy;  Surgeon: Robert Lugo MD;  Location:  GI    EMBOLECTOMY ABDOMEN N/A 2018    Procedure: EMBOLECTOMY ABDOMEN;;  Surgeon: Ector Mcintyre MD;  Location:  OR "    ENDOSCOPIC ULTRASOUND UPPER GASTROINTESTINAL TRACT (GI) N/A 1/18/2023    Procedure: ENDOSCOPIC ULTRASOUND, ESOPHAGOSCOPY / UPPER GASTROINTESTINAL TRACT (GI), with cystgastrostomy stent placement and dilation;  Surgeon: Khoi Gomes MD;  Location: [facility] OR    ENDOSCOPIC ULTRASOUND UPPER GASTROINTESTINAL TRACT (GI) N/A 2/8/2023    Procedure: ENDOSCOPIC ULTRASOUND, UPPER GASTROINTESTINAL TRACT (GI), Esophagogastroduodenoscopy with biopsies;  Surgeon: Khoi Gomes MD;  Location: [facility] OR    ESOPHAGOSCOPY, GASTROSCOPY, DUODENOSCOPY (EGD), COMBINED N/A 12/13/2022    Procedure: ESOPHAGOGASTRODUODENOSCOPY,  NEEDLE  WITH ENDOSCOPIC ULTRASOUND GUIDANCE;  Surgeon: Khoi Gomes MD;  Location: [facility] OR    ESOPHAGOSCOPY, GASTROSCOPY, DUODENOSCOPY (EGD), COMBINED N/A 4/19/2023    Procedure: ESOPHAGOGASTRODUODENOSCOPY with stent removal;  Surgeon: Khoi Gomes MD;  Location: [facility] OR    EXTRACORPOREAL SHOCK WAVE LITHOTRIPSY (ESWL)      GASTRIC BYPASS  2016    AZ CYSTO/URETERO W/LITHOTRIPSY &INDWELL STENT INSRT Left 5/11/2018    Procedure: CYSTOSCOPY, LEFT URETEROSCOPY LASER LITHOTRIPSY STENT INSERTION;  Surgeon: Skyler Delvalle MD;  Location: [facility] OR;  Service: Urology    AZ LAP GASTRIC BYPASS/DERICK-EN-Y N/A 6/8/2016    Procedure: GASTRIC BYPASS LAPAROSCOPIC DERICK-EN-Y;  Surgeon: Randy Sutherland MD;  Location: [facility] OR;  Service: General    TUBAL LIGATION      WISDOM TOOTH EXTRACTION Bilateral      Current providers sharing in care for this patient include:  Patient Care Team:  Ivette Leal MD as PCP - General (Family Medicine)  Weight, Warren Patel MD as MD (Urology)  [facility] as Referring Physician  Cesar Monsivais MD as MD (Radiation Oncology)  Lucinda Holley APRN CNP as Nurse Practitioner (Nurse Practitioner)  Veena Jaquez MD as Assigned Endocrinology Provider  [facility], Tru Vieira MD as Assigned Cancer Care  "Provider  vIette Leal MD as Assigned PCP  Breezy Moreno MD as Assigned Musculoskeletal Provider    The following health maintenance items are reviewed in Epic and correct as of today:  Health Maintenance   Topic Date Due    Pneumococcal Vaccine: Pediatrics (0 to 5 Years) and At-Risk Patients (6 to 49 Years) (1 of 2 - PCV) Never done    ZOSTER IMMUNIZATION (1 of 2) Never done    ANNUAL REVIEW OF HM ORDERS  11/21/2023    COVID-19 Vaccine (8 - Pfizer risk 2024-25 season) 04/02/2025    PHQ-9  09/25/2025    TSH W/FREE T4 REFLEX  11/12/2025    MEDICARE ANNUAL WELLNESS VISIT  03/25/2026    HPV TEST  10/29/2026    PAP  10/29/2026    MAMMO SCREENING  11/14/2026    DIABETES SCREENING  11/12/2027    ADVANCE CARE PLANNING  03/21/2028    LIPID  06/25/2029    DTAP/TDAP/TD IMMUNIZATION (4 - Td or Tdap) 11/06/2029    HEPATITIS C SCREENING  Completed    HIV SCREENING  Completed    DEPRESSION ACTION PLAN  Completed    INFLUENZA VACCINE  Completed    HEPATITIS B IMMUNIZATION  Completed    HPV IMMUNIZATION  Aged Out    MENINGITIS IMMUNIZATION  Aged Out         Review of Systems  Constitutional, HEENT, cardiovascular, pulmonary, GI, , musculoskeletal, neuro, skin, endocrine and psych systems are negative, except as otherwise noted.     Objective    Exam  /68   Pulse 83   Temp 97.8  F (36.6  C) (Tympanic)   Resp 16   Ht 1.63 m (5' 4.17\")   Wt 71.4 kg (157 lb 6 oz)   SpO2 98%   BMI 26.87 kg/m     Estimated body mass index is 26.87 kg/m  as calculated from the following:    Height as of this encounter: 1.63 m (5' 4.17\").    Weight as of this encounter: 71.4 kg (157 lb 6 oz).    Physical Exam    Physical Exam:  General Appearance: Alert, cooperative, no distress, appears stated age   Head: Normocephalic, without obvious abnormality, atraumatic  Eyes: PERRL, conjunctiva/corneas clear, EOM's intact   Ears: Normal TM's and external ear canals, both ears  Nose:Nares normal, septum midline,mucosa normal, no " drainage    Throat:Lips, mucosa, and tongue normal; teeth and gums normal  Neck: Supple, symmetrical, trachea midline, no adenopathy;  thyroid: not enlarged, symmetric, no tenderness/mass/nodules  Back: Symmetric, no curvature, ROM normal,  Lungs: Clear to auscultation bilaterally, respirations unlabored  Breasts: No breast masses, tenderness, asymmetry, or nipple discharge.  Heart: Regular rate and rhythm, S1 and S2 normal, no murmur, rub, or gallop  Abdomen: Soft, non-tender, bowel sounds active all four quadrants,  no masses, no organomegaly  Extremities: Extremities normal, atraumatic, no cyanosis or edema  Skin: Skin color, texture, turgor normal, no rashes or lesions  Lymph nodes: Cervical, supraclavicular, and axillary nodes normal and   Neurologic: Normal          3/25/2025   Mini Cog   Clock Draw Score 2 Normal   3 Item Recall 3 objects recalled   Mini Cog Total Score 5              Signed Electronically by: Ivette Leal MD

## 2025-03-26 LAB
ACTH PLAS-MCNC: 13 PG/ML
CGA SERPL-MCNC: 42 NG/ML
DHEA-S SERPL-MCNC: <15 UG/DL (ref 35–430)

## 2025-03-29 ENCOUNTER — HEALTH MAINTENANCE LETTER (OUTPATIENT)
Age: 40
End: 2025-03-29

## 2025-04-01 ENCOUNTER — MYC MEDICAL ADVICE (OUTPATIENT)
Dept: FAMILY MEDICINE | Facility: CLINIC | Age: 40
End: 2025-04-01
Payer: COMMERCIAL

## 2025-04-23 ENCOUNTER — OFFICE VISIT (OUTPATIENT)
Dept: FAMILY MEDICINE | Facility: CLINIC | Age: 40
End: 2025-04-23
Payer: COMMERCIAL

## 2025-04-23 VITALS
WEIGHT: 160.38 LBS | OXYGEN SATURATION: 98 % | DIASTOLIC BLOOD PRESSURE: 70 MMHG | BODY MASS INDEX: 27.38 KG/M2 | HEART RATE: 87 BPM | SYSTOLIC BLOOD PRESSURE: 110 MMHG | TEMPERATURE: 98.2 F | HEIGHT: 64 IN | RESPIRATION RATE: 16 BRPM

## 2025-04-23 DIAGNOSIS — Z30.430 ENCOUNTER FOR INSERTION OF INTRAUTERINE CONTRACEPTIVE DEVICE: ICD-10-CM

## 2025-04-23 DIAGNOSIS — Z01.419 ENCOUNTER FOR GYNECOLOGICAL EXAMINATION (GENERAL) (ROUTINE) WITHOUT ABNORMAL FINDINGS: ICD-10-CM

## 2025-04-23 DIAGNOSIS — Z12.4 CERVICAL CANCER SCREENING: Primary | ICD-10-CM

## 2025-04-23 PROCEDURE — 87624 HPV HI-RISK TYP POOLED RSLT: CPT | Performed by: FAMILY MEDICINE

## 2025-04-23 PROCEDURE — 3078F DIAST BP <80 MM HG: CPT | Performed by: FAMILY MEDICINE

## 2025-04-23 PROCEDURE — 58301 REMOVE INTRAUTERINE DEVICE: CPT | Performed by: FAMILY MEDICINE

## 2025-04-23 PROCEDURE — 3074F SYST BP LT 130 MM HG: CPT | Performed by: FAMILY MEDICINE

## 2025-04-23 PROCEDURE — 58300 INSERT INTRAUTERINE DEVICE: CPT | Mod: 59 | Performed by: FAMILY MEDICINE

## 2025-04-23 NOTE — PROGRESS NOTES
IUD removal and reinsertion procedure note    Pre-operative Diagnosis: contraception desired, IUD expiring    Post-operative Diagnosis: contraception placed, new IUD placed    Indications: contraception    Procedure Details   Urine pregnancy test was not done as previous IUD is not .  The risks (including infection, bleeding, pain, and uterine perforation) and benefits of the procedure were explained to the patient and Written informed consent was obtained.    The patient was laid in the supine position.  A sterile speculum was inserted into the vaginal canal.  PAP obtained.  The old IUD strings were easily visualized, grasped with a ring forceps, and the IUD was gently removed in its entirety.    Cervix cleansed with Betadine. Uterus sounded to 7.5 cm. IUD inserted without difficulty. String visible and trimmed. Patient tolerated procedure well.    IUD Information:  Mirena.    Condition:  Stable    Complications:  None    Plan:    The patient was advised to call for any fever or for prolonged or severe pain or bleeding. She was advised to use NSAID as needed for mild to moderate pain.  Return in one month for a string check.    Answers submitted by the patient for this visit:  General Questionnaire (Submitted on 2025)  Chief Complaint: Chronic problems general questions HPI Form  What is the reason for your visit today? : iud  How many days per week do you miss taking your medication?: 0  Questionnaire about: Chronic problems general questions HPI Form (Submitted on 2025)  Chief Complaint: Chronic problems general questions HPI Form

## 2025-04-24 LAB
HPV HR 12 DNA CVX QL NAA+PROBE: NEGATIVE
HPV16 DNA CVX QL NAA+PROBE: NEGATIVE
HPV18 DNA CVX QL NAA+PROBE: NEGATIVE
HUMAN PAPILLOMA VIRUS FINAL DIAGNOSIS: NORMAL

## 2025-04-29 LAB
BKR AP ASSOCIATED HPV REPORT: NORMAL
BKR LAB AP GYN ADEQUACY: NORMAL
BKR LAB AP GYN INTERPRETATION: NORMAL
BKR LAB AP PREVIOUS ABNORMAL: NORMAL
PATH REPORT.COMMENTS IMP SPEC: NORMAL
PATH REPORT.COMMENTS IMP SPEC: NORMAL
PATH REPORT.RELEVANT HX SPEC: NORMAL

## 2025-05-20 ENCOUNTER — ANCILLARY PROCEDURE (OUTPATIENT)
Dept: CT IMAGING | Facility: CLINIC | Age: 40
End: 2025-05-20
Attending: INTERNAL MEDICINE
Payer: COMMERCIAL

## 2025-05-20 DIAGNOSIS — R74.8 ELEVATED ALKALINE PHOSPHATASE LEVEL: ICD-10-CM

## 2025-05-20 DIAGNOSIS — F41.9 ANXIETY: ICD-10-CM

## 2025-05-20 DIAGNOSIS — G47.33 OSA (OBSTRUCTIVE SLEEP APNEA): ICD-10-CM

## 2025-05-20 DIAGNOSIS — C74.01 MALIGNANT NEOPLASM OF CORTEX OF RIGHT ADRENAL GLAND (H): ICD-10-CM

## 2025-05-20 PROCEDURE — 74177 CT ABD & PELVIS W/CONTRAST: CPT | Performed by: STUDENT IN AN ORGANIZED HEALTH CARE EDUCATION/TRAINING PROGRAM

## 2025-05-20 PROCEDURE — 71260 CT THORAX DX C+: CPT | Performed by: STUDENT IN AN ORGANIZED HEALTH CARE EDUCATION/TRAINING PROGRAM

## 2025-05-20 RX ORDER — IOPAMIDOL 755 MG/ML
85 INJECTION, SOLUTION INTRAVASCULAR ONCE
Status: COMPLETED | OUTPATIENT
Start: 2025-05-20 | End: 2025-05-20

## 2025-05-20 RX ADMIN — IOPAMIDOL 85 ML: 755 INJECTION, SOLUTION INTRAVASCULAR at 17:57

## 2025-05-20 NOTE — DISCHARGE INSTRUCTIONS

## 2025-05-27 ENCOUNTER — LAB (OUTPATIENT)
Dept: LAB | Facility: CLINIC | Age: 40
End: 2025-05-27
Payer: COMMERCIAL

## 2025-05-27 DIAGNOSIS — F41.9 ANXIETY: ICD-10-CM

## 2025-05-27 DIAGNOSIS — C7B.8 OTHER SECONDARY NEUROENDOCRINE TUMORS (H): ICD-10-CM

## 2025-05-27 DIAGNOSIS — C74.01 MALIGNANT NEOPLASM OF CORTEX OF RIGHT ADRENAL GLAND (H): ICD-10-CM

## 2025-05-27 DIAGNOSIS — R74.8 ELEVATED ALKALINE PHOSPHATASE LEVEL: ICD-10-CM

## 2025-05-27 DIAGNOSIS — G47.33 OSA (OBSTRUCTIVE SLEEP APNEA): ICD-10-CM

## 2025-05-27 LAB
ALBUMIN SERPL BCG-MCNC: 4.4 G/DL (ref 3.5–5.2)
ALP SERPL-CCNC: 104 U/L (ref 40–150)
ALT SERPL W P-5'-P-CCNC: 26 U/L (ref 0–50)
ANION GAP SERPL CALCULATED.3IONS-SCNC: 10 MMOL/L (ref 7–15)
AST SERPL W P-5'-P-CCNC: 26 U/L (ref 0–45)
BASOPHILS # BLD AUTO: 0.1 10E3/UL (ref 0–0.2)
BASOPHILS NFR BLD AUTO: 1 %
BILIRUB SERPL-MCNC: 0.3 MG/DL
BUN SERPL-MCNC: 17.4 MG/DL (ref 6–20)
CALCIUM SERPL-MCNC: 9.5 MG/DL (ref 8.8–10.4)
CHLORIDE SERPL-SCNC: 102 MMOL/L (ref 98–107)
CHOLEST SERPL-MCNC: 170 MG/DL
CREAT SERPL-MCNC: 0.92 MG/DL (ref 0.51–0.95)
EGFRCR SERPLBLD CKD-EPI 2021: 80 ML/MIN/1.73M2
EOSINOPHIL # BLD AUTO: 0.1 10E3/UL (ref 0–0.7)
EOSINOPHIL NFR BLD AUTO: 2 %
ERYTHROCYTE [DISTWIDTH] IN BLOOD BY AUTOMATED COUNT: 12.9 % (ref 10–15)
FASTING STATUS PATIENT QL REPORTED: NORMAL
FASTING STATUS PATIENT QL REPORTED: NORMAL
GLUCOSE SERPL-MCNC: 97 MG/DL (ref 70–99)
HCO3 SERPL-SCNC: 26 MMOL/L (ref 22–29)
HCT VFR BLD AUTO: 44.2 % (ref 35–47)
HDLC SERPL-MCNC: 82 MG/DL
HGB BLD-MCNC: 15.3 G/DL (ref 11.7–15.7)
IMM GRANULOCYTES # BLD: 0 10E3/UL
IMM GRANULOCYTES NFR BLD: 0 %
LDH SERPL L TO P-CCNC: 179 U/L (ref 0–250)
LDLC SERPL CALC-MCNC: 67 MG/DL
LYMPHOCYTES # BLD AUTO: 2.3 10E3/UL (ref 0.8–5.3)
LYMPHOCYTES NFR BLD AUTO: 26 %
MCH RBC QN AUTO: 31.2 PG (ref 26.5–33)
MCHC RBC AUTO-ENTMCNC: 34.6 G/DL (ref 31.5–36.5)
MCV RBC AUTO: 90 FL (ref 78–100)
MONOCYTES # BLD AUTO: 0.7 10E3/UL (ref 0–1.3)
MONOCYTES NFR BLD AUTO: 8 %
NEUTROPHILS # BLD AUTO: 5.7 10E3/UL (ref 1.6–8.3)
NEUTROPHILS NFR BLD AUTO: 64 %
NONHDLC SERPL-MCNC: 88 MG/DL
PLATELET # BLD AUTO: 295 10E3/UL (ref 150–450)
POTASSIUM SERPL-SCNC: 4.2 MMOL/L (ref 3.4–5.3)
PROT SERPL-MCNC: 6.9 G/DL (ref 6.4–8.3)
RBC # BLD AUTO: 4.91 10E6/UL (ref 3.8–5.2)
SODIUM SERPL-SCNC: 138 MMOL/L (ref 135–145)
TRIGL SERPL-MCNC: 105 MG/DL
TSH SERPL DL<=0.005 MIU/L-ACNC: 3.31 UIU/ML (ref 0.3–4.2)
WBC # BLD AUTO: 8.8 10E3/UL (ref 4–11)

## 2025-05-27 PROCEDURE — 86316 IMMUNOASSAY TUMOR OTHER: CPT | Mod: 90

## 2025-05-27 PROCEDURE — 83615 LACTATE (LD) (LDH) ENZYME: CPT

## 2025-05-27 PROCEDURE — 82627 DEHYDROEPIANDROSTERONE: CPT

## 2025-05-27 PROCEDURE — 36415 COLL VENOUS BLD VENIPUNCTURE: CPT

## 2025-05-27 PROCEDURE — 80053 COMPREHEN METABOLIC PANEL: CPT

## 2025-05-27 PROCEDURE — 99000 SPECIMEN HANDLING OFFICE-LAB: CPT

## 2025-05-27 PROCEDURE — 84443 ASSAY THYROID STIM HORMONE: CPT

## 2025-05-27 PROCEDURE — 80061 LIPID PANEL: CPT

## 2025-05-27 PROCEDURE — 82024 ASSAY OF ACTH: CPT

## 2025-05-27 PROCEDURE — 85025 COMPLETE CBC W/AUTO DIFF WBC: CPT

## 2025-05-28 LAB
ACTH PLAS-MCNC: 12 PG/ML
DHEA-S SERPL-MCNC: <15 UG/DL (ref 35–430)

## 2025-06-06 NOTE — GROUP NOTE
[facility] - Inpatient Rehabilitation Department   Phone: [phone]    Physical Therapy    [] Initial Evaluation            [x] Daily Treatment Note         [] Discharge Summary      Patient: Rell Lorenz   : 1955   MRN: [id number]   Date of Service:  2025  Admitting Diagnosis: Chest pain  Current Admission Summary: Per H&P on  \"70 y.o. female who presented with presents to ER with complaints of chest pain.  Severe midsternal pain began yesterday initially came on at rest but now worse with exertion.  Associate with shortness of breath radiating to the back and left arm.  Patient with history of previous MI back in  recently admitted for A-fib chest pain went underwent echocardiogram but no stress test is on aspirin Eliquis.\"    Of note the patient was admitted last week for syncope event and SNF was recommended, she declined at that time due to financial concerns and returned home. Family very supportive of rehab.    Reporting (L) side (UE/LE) which has been on-going. CT cervical/thoracic spine showed suggestion of bilateral foraminal stenosis at C5-6 slightly worse on the left.    Past Medical History:  has a past medical history of AICD (automatic cardioverter/defibrillator) present, Arthritis, CHF (congestive heart failure) ([facility]), Gout, Hyperlipidemia, Hypertension, and MI (myocardial infarction) ([facility]).  Past Surgical History:  has a past surgical history that includes Dilation and curettage of uterus; Cardiac defibrillator placement; and Pain management procedure (Bilateral, 2022).    Discharge Recommendations: Rell Lorenz scored a 17/24 on the AM-PAC short mobility form. Current research shows that an AM-PAC score of 17 or less is typically not associated with a discharge to the patient's home setting. Based on the patient's AM-PAC score and their current functional mobility deficits, it is recommended that the patient have 3-5 sessions per week of Physical Therapy at d/c  "Process Group Note    PATIENT'S NAME: Suzy Rodríguez  MRN:   [id number]  [facility]   1985  [id number]  DATE OF SERVICE: 20  START TIME:  9:00 AM  END TIME:  9:50 AM  FACILITATOR: Antoinette Morton PsyD, LP  TOPIC:  Process Group    Diagnoses:  296.32 (F33.1) Major Depressive Disorder, Recurrent Episode, Moderate _ and With anxious distress  300.02 (F41.1) Generalized Anxiety Disorder    Telemedicine Visit: The patient's condition can be safely assessed and treated via synchronous audio and visual telemedicine encounter.      Reason for Telemedicine Visit: Services only offered telehealth    Originating Site (Patient Location): Patient's home    Distant Site (Provider Location): Provider Remote Setting    Consent:  The patient/guardian has verbally consented to: the potential risks and benefits of telemedicine (video visit) versus in person care; bill my insurance or make self-payment for services provided; and responsibility for payment of non-covered services.     Mode of Communication:  Video Conference via [employer]    As the provider I attest to compliance with applicable laws and regulations related to telemedicine.     Adult Mental Health Day Treatment  TRACK: 5A    NUMBER OF PARTICIPANTS: 4          Data:    Session content: At the start of this group, patients were invited to check in by identifying themselves, describing their current emotional status, and identifying issues to address in this group.   Area(s) of treatment focus addressed in this session included Symptom Management, Personal Safety and Community Resources/Discharge Planning.    The patient reported feeling \"so so\" and tired today. They set a goal to get out of the house (go for a drive or walk). Skills they thought would be beneficial to reach their goal included motivation management skills and opposite to emotion. Identified barriers to meeting their goal(s) included low motivation and low energy. " Regarding safety, the patient denied having safety concerns. They used medical cannabis since the last group. Further, they endorsed taking medications as prescribed. They were proud of themselves for spending time with their  prior to group. They declined additional process time but contributed to group discussion and provided feedback and support to peers. In addition, they contributed to the group discussion about anxiety management strategies (psychoed provided about straw breathing, pushing hands together, pushing wall, etc).     Therapeutic Interventions/Treatment Strategies:  Psychotherapist offered support, feedback and validation and reinforced use of skills. Treatment modalities used include Cognitive Behavioral Therapy. Interventions include Cognitive Restructuring:  Assisted patient in formulating new neutral/positive alternatives to challenge less helpful / ineffective thoughts.    Assessment:    Patient response:   Patient responded to session by accepting feedback, giving feedback, listening, focusing on goals, being attentive and accepting support    Possible barriers to participation / learning include: and no barriers identified    Health Issues:   None reported       Substance Use Review:   Substance Use: No active concerns identified.    Mental Status/Behavioral Observations  Appearance:   Appropriate   Eye Contact:   Unable to assess via video   Psychomotor Behavior: Normal   Attitude:   Cooperative   Orientation:   All  Speech   Rate / Production: Normal/ Responsive   Volume:  Normal   Mood:    Anxious  Depressed   Affect:    Appropriate   Thought Content:   Clear  Thought Form:  Coherent  Logical     Insight:    Good     Plan:     Safety Plan: No current safety concerns identified.  Recommended that patient call 911 or go to the local ED should there be a change in any of these risk factors.     Barriers to treatment: None identified    Patient Contracts (see media tab):   None    Substance Use: Not addressed in session     Continue or Discharge: Patient will continue in Adult Day Treatment (ADT)  as planned. Patient is likely to benefit from learning and using skills as they work toward the goals identified in their treatment plan.      Antoinette Morton PsyD, LP  June 18, 2020

## 2025-06-11 ENCOUNTER — MYC MEDICAL ADVICE (OUTPATIENT)
Dept: ONCOLOGY | Facility: CLINIC | Age: 40
End: 2025-06-11
Payer: COMMERCIAL

## 2025-06-11 DIAGNOSIS — Z79.899 HIGH RISK MEDICATIONS (NOT ANTICOAGULANTS) LONG-TERM USE: Primary | ICD-10-CM

## 2025-06-11 DIAGNOSIS — R91.8 PULMONARY NODULES: ICD-10-CM

## 2025-06-11 DIAGNOSIS — C7B.8 OTHER SECONDARY NEUROENDOCRINE TUMORS (H): ICD-10-CM

## 2025-06-11 DIAGNOSIS — C74.01 MALIGNANT NEOPLASM OF CORTEX OF RIGHT ADRENAL GLAND (H): Primary | ICD-10-CM

## 2025-06-17 ENCOUNTER — LAB (OUTPATIENT)
Dept: LAB | Facility: CLINIC | Age: 40
End: 2025-06-17
Payer: COMMERCIAL

## 2025-06-17 DIAGNOSIS — Z79.899 HIGH RISK MEDICATIONS (NOT ANTICOAGULANTS) LONG-TERM USE: ICD-10-CM

## 2025-06-17 LAB
ALBUMIN SERPL BCG-MCNC: 4.1 G/DL (ref 3.5–5.2)
ALP SERPL-CCNC: 106 U/L (ref 40–150)
ALT SERPL W P-5'-P-CCNC: 21 U/L (ref 0–50)
AST SERPL W P-5'-P-CCNC: 21 U/L (ref 0–45)
BILIRUB DIRECT SERPL-MCNC: 0.14 MG/DL (ref 0–0.3)
BILIRUB SERPL-MCNC: 0.4 MG/DL
PROT SERPL-MCNC: 6.4 G/DL (ref 6.4–8.3)

## 2025-06-17 PROCEDURE — 80076 HEPATIC FUNCTION PANEL: CPT

## 2025-06-17 PROCEDURE — 36415 COLL VENOUS BLD VENIPUNCTURE: CPT

## 2025-07-01 ENCOUNTER — HOSPITAL ENCOUNTER (OUTPATIENT)
Dept: PET IMAGING | Facility: HOSPITAL | Age: 40
Discharge: HOME | End: 2025-07-01
Attending: INTERNAL MEDICINE
Payer: COMMERCIAL

## 2025-07-01 ENCOUNTER — ONCOLOGY VISIT (OUTPATIENT)
Dept: ONCOLOGY | Facility: CLINIC | Age: 40
End: 2025-07-01
Attending: INTERNAL MEDICINE
Payer: COMMERCIAL

## 2025-07-01 VITALS
SYSTOLIC BLOOD PRESSURE: 124 MMHG | RESPIRATION RATE: 16 BRPM | WEIGHT: 163.8 LBS | TEMPERATURE: 98.2 F | DIASTOLIC BLOOD PRESSURE: 85 MMHG | OXYGEN SATURATION: 98 % | BODY MASS INDEX: 27.97 KG/M2 | HEART RATE: 80 BPM

## 2025-07-01 DIAGNOSIS — R91.8 PULMONARY NODULES: ICD-10-CM

## 2025-07-01 DIAGNOSIS — C74.01 MALIGNANT NEOPLASM OF CORTEX OF RIGHT ADRENAL GLAND (H): ICD-10-CM

## 2025-07-01 DIAGNOSIS — G47.33 OSA (OBSTRUCTIVE SLEEP APNEA): ICD-10-CM

## 2025-07-01 DIAGNOSIS — C74.01 MALIGNANT NEOPLASM OF CORTEX OF RIGHT ADRENAL GLAND (H): Primary | ICD-10-CM

## 2025-07-01 DIAGNOSIS — C7B.8 OTHER SECONDARY NEUROENDOCRINE TUMORS (H): ICD-10-CM

## 2025-07-01 LAB — GLUCOSE BLDC GLUCOMTR-MCNC: 97 MG/DL (ref 70–99)

## 2025-07-01 PROCEDURE — 343N000001 HC RX 343 MED OP 636: Performed by: INTERNAL MEDICINE

## 2025-07-01 PROCEDURE — G2211 COMPLEX E/M VISIT ADD ON: HCPCS | Performed by: INTERNAL MEDICINE

## 2025-07-01 PROCEDURE — 78815 PET IMAGE W/CT SKULL-THIGH: CPT | Mod: PS

## 2025-07-01 PROCEDURE — 74177 CT ABD & PELVIS W/CONTRAST: CPT

## 2025-07-01 PROCEDURE — 99213 OFFICE O/P EST LOW 20 MIN: CPT | Performed by: INTERNAL MEDICINE

## 2025-07-01 PROCEDURE — 99214 OFFICE O/P EST MOD 30 MIN: CPT | Performed by: INTERNAL MEDICINE

## 2025-07-01 PROCEDURE — A9552 F18 FDG: HCPCS | Performed by: INTERNAL MEDICINE

## 2025-07-01 PROCEDURE — 250N000011 HC RX IP 250 OP 636: Performed by: INTERNAL MEDICINE

## 2025-07-01 PROCEDURE — 82962 GLUCOSE BLOOD TEST: CPT

## 2025-07-01 RX ORDER — FLUDEOXYGLUCOSE F 18 200 MCI/ML
8-18 INJECTION, SOLUTION INTRAVENOUS ONCE
Status: COMPLETED | OUTPATIENT
Start: 2025-07-01 | End: 2025-07-01

## 2025-07-01 RX ORDER — IOPAMIDOL 755 MG/ML
79 INJECTION, SOLUTION INTRAVASCULAR ONCE
Status: COMPLETED | OUTPATIENT
Start: 2025-07-01 | End: 2025-07-01

## 2025-07-01 RX ORDER — ESZOPICLONE 2 MG/1
2 TABLET, FILM COATED ORAL
COMMUNITY
Start: 2025-06-11

## 2025-07-01 RX ADMIN — IOPAMIDOL 79 ML: 755 INJECTION, SOLUTION INTRAVENOUS at 07:18

## 2025-07-01 RX ADMIN — FLUDEOXYGLUCOSE F 18 10.1 MILLICURIE: 200 INJECTION, SOLUTION INTRAVENOUS at 06:15

## 2025-07-01 ASSESSMENT — PAIN SCALES - GENERAL: PAINLEVEL_OUTOF10: NO PAIN (0)

## 2025-07-01 NOTE — Clinical Note
"7/1/2025      Suzy Rodríguez  [address] [facility] [address]      Dear Colleague,    Thank you for referring your patient, Suzy Rodríguez, to the [facility]. Please see a copy of my visit note below.      [facility] Oncology Followup  Jul 1, 2025     REFERRING PROVIDER: Warren Mansfield MD from [facility] Urologic Oncology clinic.      REASON FOR CURRENT VISIT: Follow-up for adrenocortical carcinoma,    ONCOLOGIC HISTORY:  1. 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   - CT abdomen and pelvis stone protocol without contrast 5 8012 showed \"9.2 x 8 cm heterogeneous right upper quadrant mass with small foci of calcification within it which is likely arising from the adrenal gland though inseparable from liver and upper pole of right kidney. It is incompletely evaluated on this noncontrast study. 3 x 2.6 cm mass right lobe of liver on image #85 also incompletely evaluated. 6 x 4 x 4 mm upper third left ureteral obstructing stone with mild to moderate secondary hydronephrosis. Collection of stones at lower pole left kidney extending over an area of approximately 6 x 7 x 4 mm. Additional nonobstructing 1 mm stone upper pole left kidney. 2 mm nonobstructing stone noted upper pole right kidney with no hydronephrosis on the right. Postop change consistent with gastric bypass. Noncontrast images of spleen, left adrenal gland, gallbladder, and pancreas unremarkable. No aneurysm. No adenopathy.\"  - Seen by Dr. Delvalle at [facility] Urology clinic. Referred to Dr. Alvino Patel and then Dr. Warren Mansfield.  - Endocrinology team directed workup showed high DHEAS level of 740 pre-surgery.   - Right open adrenalectomy and hepatic mobilization performed by Dr. Warren Mansfield on 6/25/2018. Pathology " showed adrenocortical carcinoma measuring 11.3 cm, weighing 413 g with extension into or through the adrenal capsule negative lymphovascular invasion, tumor necrosis present, margins involved by tumor, no regional lymph nodes identified, pathologic stage T2 NX.  pathology review reveals low grade ACC.  - DHEAS normalized postsurgery.   - Adjuvant Mitotane started on 7/24/18. Dose increased to 2000mg TID around 10/23/18 due to persistently low troughs. Held 1/16/19 for two weeks and resumed 1/30 at 1000 mg po BID due to very poor tolerance of higher doses. Highest mitotane level was 10.2 on 2/11/19. Mitotane troughs did not rise above 10 despite increase in dose to 1500 BID and then 1500+2000. Started 2000mg BID on 10/28/20. Increased to [phone] since around Christmas 2020.   - Saw radiation oncology at [facility], radiation oncology at [facility], as well as endocrine oncology (Dr. Huertas) at [facility].   - S/p adjuvant RT by Dr. Monsivais - 5040 cGy over 30 fr (8/24/18-10/6/18).  - 2/11/19: OSH CT C/A/P and MRI brain with contrast - no evidence of disease recurrence.   - 06/08/20, 09/14/20, 12/7/20, 3/16/2021: CT C/A/P with contrast - AFUA.    INTERVAL HISTORY:  Ms. Rodríguez is a 39 year old woman with history notable for right-sided functioning adrenocortical carcinoma (diagnosed in May 2018), who was  on adjuvant mitotane until May 2021.     She is being followed over a video visit. I joined late for the visit and she had dropped off.     REVIEW OF SYSTEMS: 14 point ROS negative other than the symptoms noted above in the HPI.    PAST MEDICAL HISTORY:  Past Medical History:   Diagnosis Date    Adrenal cortex cancer, right (H) 6/30/2018    Resected 6/25/18, Dr. Mansfield.    Adrenal mass     Anemia     thought due to heavy menses.    Calculus of ureter 5/9/2018    Carcinoma, adrenal cortical (H) 7/18/2018    Chocolate cyst of ovary 2011    Clotting disorder     Depression      "GERD (gastroesophageal reflux disease)     History of miscarriage     Hypertension due to endocrine disorder 2018    Kidney stones     passed on their own    Malignant neoplasm of cortex of right adrenal gland (H) 2018    EOTD; 19.  Check in May. SCP started.  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    SHREYA on CPAP 2015    Stopped using CPAP the  after weight loss as even at lower pressure/adjustment couldn't tolerate the cpap. Resting well, cautioned to watch for any signs of fatigue and consider \"titration study\" in the future to see if cpap is still required at her new weight.    Pneumonia     hx of recurrent pneumonia issues/respiratory infections.    Polycystic ovary syndrome     able to get pregnant, not on meds.    Pulmonary embolism (H)  or so    briefly on wafarin.    Sleep apnea     cpap    Vitamin D deficiency    MHTFR mutation with h/o mutiple miscarriages.    PAST SURGICAL HISTORY:   Past Surgical History:   Procedure Laterality Date    ADRENALECTOMY Right 2018    Procedure: ADRENALECTOMY;  Right Adrenalectomy,  Embolize Liver , Anesthesia Block;  Surgeon: Warren Mansfield MD;  Location: [facility]    ADRENALECTOMY      ADRENALECTOMY Right      SECTION      twice     SECTION      2     SECTION      x2    COLONOSCOPY N/A 3/11/2024    Procedure: Colonoscopy;  Surgeon: Robert Lugo MD;  Location:  GI    EMBOLECTOMY ABDOMEN N/A 2018    Procedure: EMBOLECTOMY ABDOMEN;;  Surgeon: Ector Mcintyre, " MD;  Location: [facility] OR    ENDOSCOPIC ULTRASOUND UPPER GASTROINTESTINAL TRACT (GI) N/A 1/18/2023    Procedure: ENDOSCOPIC ULTRASOUND, ESOPHAGOSCOPY / UPPER GASTROINTESTINAL TRACT (GI), with cystgastrostomy stent placement and dilation;  Surgeon: Khoi Gomes MD;  Location: [facility] OR    ENDOSCOPIC ULTRASOUND UPPER GASTROINTESTINAL TRACT (GI) N/A 2/8/2023    Procedure: ENDOSCOPIC ULTRASOUND, UPPER GASTROINTESTINAL TRACT (GI), Esophagogastroduodenoscopy with biopsies;  Surgeon: Khoi Gomes MD;  Location: [facility] OR    ESOPHAGOSCOPY, GASTROSCOPY, DUODENOSCOPY (EGD), COMBINED N/A 12/13/2022    Procedure: ESOPHAGOGASTRODUODENOSCOPY,  NEEDLE  WITH ENDOSCOPIC ULTRASOUND GUIDANCE;  Surgeon: Khoi Gomes MD;  Location: [facility]    ESOPHAGOSCOPY, GASTROSCOPY, DUODENOSCOPY (EGD), COMBINED N/A 4/19/2023    Procedure: ESOPHAGOGASTRODUODENOSCOPY with stent removal;  Surgeon: Khoi Gomes MD;  Location: [facility]    EXTRACORPOREAL SHOCK WAVE LITHOTRIPSY (ESWL)      GASTRIC BYPASS  2016    SC CYSTO/URETERO W/LITHOTRIPSY &INDWELL STENT INSRT Left 5/11/2018    Procedure: CYSTOSCOPY, LEFT URETEROSCOPY LASER LITHOTRIPSY STENT INSERTION;  Surgeon: Skyler Delvalle MD;  Location: [facility];  Service: Urology    SC LAP GASTRIC BYPASS/DERICK-EN-Y N/A 6/8/2016    Procedure: GASTRIC BYPASS LAPAROSCOPIC DERICK-EN-Y;  Surgeon: Randy Sutherland MD;  Location: [facility];  Service: General    TUBAL LIGATION      WISDOM TOOTH EXTRACTION Bilateral       SOCIAL HISTORY:   Social History     Tobacco Use    Smoking status: Former    Smokeless tobacco: Never    Tobacco comments:     vapes marijuana   Vaping Use    Vaping status: Never Used   Substance Use Topics    Alcohol use: Yes     Comment: occ.    Drug use: Not Currently     Types: Marijuana     FAMILY HISTORY:   Family History   Problem Relation Age of Onset    Depression Paternal Grandmother     Diabetes Mother     Diabetes Father     Hypertension Father      Chronic Obstructive Pulmonary Disease Father     Cancer Maternal Grandmother         gastric    Urolithiasis Maternal Grandmother     Heart Disease Maternal Grandfather     Cancer Maternal Grandfather         lung    Heart Disease Paternal Grandfather     Breast Cancer No family hx of     Colon Cancer No family hx of     Prostate Cancer No family hx of     Cerebrovascular Disease No family hx of     Clotting Disorder No family hx of     Gout No family hx of      ALLERGIES:   Allergies   Allergen Reactions    Bee Venom Swelling    Ibuprofen Hives and Swelling     CURRENT MEDICATIONS:   Current Outpatient Medications   Medication Instructions    acetaminophen (TYLENOL) 650 mg, Oral, EVERY 4 HOURS PRN    buPROPion (WELLBUTRIN SR) 150 mg, Oral    calcitRIOL (ROCALTROL) 0.5 MCG capsule No dose, route, or frequency recorded.    calcium carbonate 500 mg (elemental) (OSCAL) 500 mg, Oral, 2 TIMES DAILY    clonazePAM (KLONOPIN) 0.5 mg, Oral, 2 TIMES DAILY PRN    diazepam (VALIUM) 5 mg, Oral, ONCE PRN    fludrocortisone (FLORINEF) 0.2 mg, Oral, DAILY    FLUoxetine (PROZAC) 60 mg, Oral, DAILY    gabapentin (NEURONTIN) 900 mg, Oral, 3 TIMES DAILY    hydrocortisone (CORTEF) 10 MG tablet Take 3 tablets (30 MG) every morning and take 2 tablets (20 MG) by mouth every day at 2 PM. Additional as directed.    levothyroxine (SYNTHROID/LEVOTHROID) 125 mcg, Oral, DAILY    ondansetron (ZOFRAN) 8 mg, Oral, EVERY 8 HOURS PRN    prochlorperazine (COMPAZINE) 5 mg, Oral, EVERY 8 HOURS PRN    temazepam (RESTORIL) 15 mg, Oral, AT BEDTIME PRN    UNABLE TO FIND Other, medical cannabis (Patient's own supply. Not a prescription)       PHYSICAL EXAMINATION:  Vital signs: /85 (BP Location: Right arm, Patient Position: Sitting, Cuff Size: Adult Regular)   Pulse 80   Temp 98.2  F (36.8  C) (Oral)   Resp 16   Wt 74.3 kg (163 lb 12.8 oz)   SpO2 98%   BMI 27.97 kg/m     Gen: NAD, on video no distress  No other exam  Recent Labs   Lab Test  "07/01/25  0617 05/27/25  1517 03/25/25  1128 11/12/24  1533 06/25/24  1549 07/19/23  1047   NA  --  138 141 138 139 140   POTASSIUM  --  4.2 4.2 4.5 4.3 4.2   CHLORIDE  --  102 102 102 102 104   CO2  --  26 27 29 27 27   ANIONGAP  --  10 12 7 10 9   BUN  --  17.4 11.6 10.5 8.4 9.6   CR  --  0.92 0.78 0.74 0.72 0.85   GLC 97 97 86 83 75 90   SHASHA  --  9.5 9.1 9.4 9.6 9.7     No results for input(s): \"MAG\", \"PHOS\" in the last 65348 hours.  Recent Labs   Lab Test 05/27/25  1517 03/25/25  1128 06/25/24  1549 07/19/23  1047 03/24/23  0747 11/21/22  1527 11/07/22  1326   WBC 8.8 4.9 5.4 4.4 7.2   < > 5.0   HGB 15.3 14.0 15.5 13.8 13.2   < > 12.4    200 214 159 197   < > 257   MCV 90 93 92 92 84   < > 85   NEUTROPHIL 64 64 63 60  --   --  60    < > = values in this interval not displayed.     Recent Labs   Lab Test 06/17/25  0823 05/27/25  1517 03/25/25  1128 11/12/24  1533 06/25/24  1549   BILITOTAL 0.4 0.3 0.3   < > 0.4   ALKPHOS 106 104 98   < > 100   ALT 21 26 23   < > 19   AST 21 26 26   < > 22   ALBUMIN 4.1 4.4 4.2   < > 4.6   LDH  --  179 155  --  166    < > = values in this interval not displayed.     TSH   Date Value Ref Range Status   05/27/2025 3.31 0.30 - 4.20 uIU/mL Final   03/25/2025 2.78 0.30 - 4.20 uIU/mL Final   11/12/2024 2.28 0.30 - 4.20 uIU/mL Final   05/12/2022 0.16 (L) 0.40 - 4.00 mU/L Final   03/24/2022 0.08 (L) 0.40 - 4.00 mU/L Final   02/08/2022 0.29 (L) 0.40 - 4.00 mU/L Final   07/05/2021 0.22 (L) 0.40 - 4.00 mU/L Final   06/04/2021 0.44 0.40 - 4.00 mU/L Final   05/10/2021 0.18 (L) 0.40 - 4.00 mU/L Final     No results for input(s): \"CEA\" in the last 98555 hours.  Results for orders placed or performed in visit on 05/20/25   CT Chest/Abdomen/Pelvis w Contrast    Narrative    EXAMINATION: CT CHEST/ABDOMEN/PELVIS W CONTRAST, 5/20/2025 6:09 PM    TECHNIQUE:  Helical CT images from the thoracic inlet through the  symphysis pubis were obtained  with contrast. Contrast dose: ISOVUE  370 " 85cc    COMPARISON: 6/25/2024    HISTORY: Right-sided functioning high-risk adrenocortical carcinoma  post surgery and adjuvant therapy followed for surveillance; Malignant  neoplasm of cortex of right adrenal gland (H); Elevated alkaline  phosphatase level; Anxiety; SHREYA (obstructive sleep apnea)    FINDINGS:    Chest: Central tracheobronchial tree is patent. No pneumothorax. 5 mm  costal pleural based nodular appearing density in the left lower lobe  (4/120), new from prior. 2 mm nodular density along the inferior left  oblique fissure (4/176), new from prior. Similar small plaque-like  thickening along the superior left oblique fissure (4/123).  Curvilinear density in the right lower lobe, favor subsegmental  atelectasis.    Visualized thyroid gland shows similar subcentimeter nodules. No new  overt breast mass, mammogram is more sensitive for small breast  lesions. No central pulmonary thromboembolism on this nondedicated  study. No new bulky thoracic lymph nodes. No pericardial effusion. No  pleural effusion    Abdomen and pelvis: Similar hepatic observation in segment 5/6 with  peripheral nodular enhancement compatible with hemangioma. No new  focal hepatic observation. No biliary ductal dilatation. No calcified  gallstone. Symmetric enhancement of the kidneys. Postsurgical changes  of the right adrenalectomy. No new masslike renal lesion, similar  atrophic changes of right kidney.. No hydronephrosis. Extrarenal left  renal pelvis. No splenomegaly    Postsurgical changes of gastric bypass. Polypoid density in the  duodenal bulb, measuring about 1.5 cm, previously measured 1.5 cm and  measured similarly (6/32) . No high-grade bowel obstruction. No  evidence for appendicitis. Metallic intraluminal appearing density  along cecum (6/22). Moderate colonic stool burden.    Intrauterine contraceptive device. Left ovarian cyst measuring about  3.2 cm compared to 3.2 cm (3/518), when measured similarly.  Partially  distended urinary bladder.    Patent major abdominal vasculature. No new bulky adenopathy. No  ascites or pneumoperitoneum. Similar soft tissue nodule with coarse  calcification in the pelvis adjacent to the rectosigmoid (3/509).  Prominent inguinal nodes.    Bones and soft tissues: Small fat-containing ventral hernia. Stable 3  mm sclerotic focus in the proximal left femur possibly bone island.  Similar lucency in C7 vertebral body (8/60). No new aggressive or  acute osseous lesion.      Impression    IMPRESSION: Compared to prior CT from 6/25/2024, the current scan  shows;    1.Subcentimeter peripheral pleural-based nodular density in the left  lower lobe, indeterminate, differential includes possible atelectatic  density. 2 mm nodular density along the inferior left oblique fissure,  indeterminate, potential includes intrapulmonary node. Recommend  attention on short-term follow-up.    2. No new masslike lesion in the abdomen or pelvis.    3. Persistent left ovarian cyst measuring up to 3.2cm, may consider  ultrasound evaluation.    4. Metallic intraluminal appearing density along cecum possibly  representing post intervention or ingested, consider correlation    5. Additional incidental findings similar to prior, as detailed above.    ELSA ALLEN MD         SYSTEM ID:  [id number]     *Note: Due to a large number of results and/or encounters for the requested time period, some results have not been displayed. A complete set of results can be found in Results Review.            ASSESSMENT AND PLAN: A 39 year old  female with right-sided functioning high-risk adrenocortical carcinoma.     Adrenocortical carcinoma:  - Started on adjuvant mitotane in July 2018 based on her presentation and tumor characteristics including invasion of the adrenal capsule, high mitotic rate, possibly positive margins, and high Ki67, which could result in a rate of recurrence as high as 40%.    The mitotane was stopped by  Dr. Liang and endocrinologic oncologist from [facility] in May 2021.        I have reviewed all of the labs done prior to this clinic visit.  Labs are all completely normal including electrolytes, renal function, hepatic panel, complete blood count and differential. Her tumor markers are within normal limits. The scans do not show evidence of recurrent disease.     I joined late as she was booked outside of clinic timing. She had dropped off. I called her over 15 times over the next several hrs and could not reach her.     She follows at [facility]. She is over 6 yrs from her surgery and 3 yrs from discontinuing her mitotane.      Video-Visit Details    Type of service:  Video Visit  Originating Location (pt. Location): Home  Distant Location (provider location):  [facility]   Platform used for Video Visit: Nub[employer]    No charge visit      Tru Hernadez    Hematologist and Medical Oncologist  [facility]          Again, thank you for allowing me to participate in the care of your patient.        Sincerely,        Tru Hernadez MD    Electronically signed

## 2025-07-01 NOTE — NURSING NOTE
"Oncology Rooming Note    July 1, 2025 10:26 AM   Suzy Rodrígeuz is a 39 year old female who presents for:    Chief Complaint   Patient presents with    Oncology Clinic Visit     Malignant neoplasm of cortex of right adrenal gland     Initial Vitals: /85 (BP Location: Right arm, Patient Position: Sitting, Cuff Size: Adult Regular)   Pulse 80   Temp 98.2  F (36.8  C) (Oral)   Resp 16   Wt 74.3 kg (163 lb 12.8 oz)   SpO2 98%   BMI 27.97 kg/m   Estimated body mass index is 27.97 kg/m  as calculated from the following:    Height as of 4/23/25: 1.63 m (5' 4.17\").    Weight as of this encounter: 74.3 kg (163 lb 12.8 oz). Body surface area is 1.83 meters squared.  No Pain (0) Comment: Data Unavailable   No LMP recorded. (Menstrual status: IUD).  Allergies reviewed: Yes  Medications reviewed: Yes    Medications: Medication refills not needed today.  Pharmacy name entered into [employer]: [id number] [place] [address] [facility]    Frailty Screening:   Is the patient here for a new oncology consult visit in cancer care? 2. No      Clinical concerns: Pt reports no new concerns today.        Daisy Tang, EMT     "

## 2025-07-01 NOTE — PROGRESS NOTES
"  [facility] Oncology Followup  Jul 1, 2025     REFERRING PROVIDER: Warren Mansfield MD from [facility] Urologic Oncology clinic.      REASON FOR CURRENT VISIT: Follow-up for adrenocortical carcinoma,    ONCOLOGIC HISTORY:  1. 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   - CT abdomen and pelvis stone protocol without contrast 5 8019 showed \"9.2 x 8 cm heterogeneous right upper quadrant mass with small foci of calcification within it which is likely arising from the adrenal gland though inseparable from liver and upper pole of right kidney. It is incompletely evaluated on this noncontrast study. 3 x 2.6 cm mass right lobe of liver on image #85 also incompletely evaluated. 6 x 4 x 4 mm upper third left ureteral obstructing stone with mild to moderate secondary hydronephrosis. Collection of stones at lower pole left kidney extending over an area of approximately 6 x 7 x 4 mm. Additional nonobstructing 1 mm stone upper pole left kidney. 2 mm nonobstructing stone noted upper pole right kidney with no hydronephrosis on the right. Postop change consistent with gastric bypass. Noncontrast images of spleen, left adrenal gland, gallbladder, and pancreas unremarkable. No aneurysm. No adenopathy.\"  - Seen by Dr. Delvalle at [facility] Urology clinic. Referred to Dr. Alvino Patel and then Dr. Warren Mansfield.  - Endocrinology team directed workup showed high DHEAS level of 740 pre-surgery.   - Right open adrenalectomy and hepatic mobilization performed by Dr. Warren Mansfield on 6/25/2018. Pathology showed adrenocortical carcinoma measuring 11.3 cm, weighing 413 g with extension into or through the adrenal capsule negative lymphovascular invasion, tumor necrosis present, margins involved by tumor, no regional lymph nodes identified, pathologic " stage T2 NX.  pathology review reveals low grade ACC.  - DHEAS normalized postsurgery.   - Adjuvant Mitotane started on 7/24/18. Dose increased to 2000mg TID around 10/23/18 due to persistently low troughs. Held 1/16/19 for two weeks and resumed 1/30 at 1000 mg po BID due to very poor tolerance of higher doses. Highest mitotane level was 10.2 on 2/11/19. Mitotane troughs did not rise above 10 despite increase in dose to 1500 BID and then 1500+2000. Started 2000mg BID on 10/28/20. Increased to [phone] since around Christmas 2020.   - Saw radiation oncology at [facility], radiation oncology at [facility], as well as endocrine oncology (Dr. Huertas) at [facility].   - S/p adjuvant RT by Dr. Monsivais - 5040 cGy over 30 fr (8/24/18-10/6/18).  - 2/11/19: OSH CT C/A/P and MRI brain with contrast - no evidence of disease recurrence.   - 06/08/20, 09/14/20, 12/7/20, 3/16/2021: CT C/A/P with contrast - AFUA.    INTERVAL HISTORY:  Ms. Rodríguez is a 39 year old woman with history notable for right-sided functioning adrenocortical carcinoma (diagnosed in May 2018), who was  on adjuvant mitotane until May 2021.     She is being followed over a video visit. I joined late for the visit and she had dropped off.     REVIEW OF SYSTEMS: 14 point ROS negative other than the symptoms noted above in the HPI.    PAST MEDICAL HISTORY:  Past Medical History:   Diagnosis Date    Adrenal cortex cancer, right (H) 6/30/2018    Resected 6/25/18, Dr. Mansfield.    Adrenal mass     Anemia     thought due to heavy menses.    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   "Check in May. SCP started.  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    SHREYA on CPAP 2015    Stopped using CPAP the  of  after weight loss as even at lower pressure/adjustment couldn't tolerate the cpap. Resting well, cautioned to watch for any signs of fatigue and consider \"titration study\" in the future to see if cpap is still required at her new weight.    Pneumonia     hx of recurrent pneumonia issues/respiratory infections.    Polycystic ovary syndrome     able to get pregnant, not on meds.    Pulmonary embolism (H)  or so    briefly on wafarin.    Sleep apnea     cpap    Vitamin D deficiency    MHTFR mutation with h/o mutiple miscarriages.    PAST SURGICAL HISTORY:   Past Surgical History:   Procedure Laterality Date    ADRENALECTOMY Right 2018    Procedure: ADRENALECTOMY;  Right Adrenalectomy,  Embolize Liver , Anesthesia Block;  Surgeon: Warren Mansfield MD;  Location: [facility] OR    ADRENALECTOMY      ADRENALECTOMY Right      SECTION      twice     SECTION      2     SECTION      x2    COLONOSCOPY N/A 3/11/2024    Procedure: Colonoscopy;  Surgeon: Robert Lugo MD;  Location:  GI    EMBOLECTOMY ABDOMEN N/A 2018    Procedure: EMBOLECTOMY ABDOMEN;;  Surgeon: Ector Mcintyre MD;  Location: [facility] OR    ENDOSCOPIC ULTRASOUND UPPER GASTROINTESTINAL TRACT (GI) N/A 2023    Procedure: ENDOSCOPIC ULTRASOUND, ESOPHAGOSCOPY / UPPER GASTROINTESTINAL TRACT (GI), with cystgastrostomy stent placement and dilation;  Surgeon: " Khoi Gomes MD;  Location: [facility] OR    ENDOSCOPIC ULTRASOUND UPPER GASTROINTESTINAL TRACT (GI) N/A 2/8/2023    Procedure: ENDOSCOPIC ULTRASOUND, UPPER GASTROINTESTINAL TRACT (GI), Esophagogastroduodenoscopy with biopsies;  Surgeon: Kohi Gomes MD;  Location: [facility] OR    ESOPHAGOSCOPY, GASTROSCOPY, DUODENOSCOPY (EGD), COMBINED N/A 12/13/2022    Procedure: ESOPHAGOGASTRODUODENOSCOPY,  NEEDLE  WITH ENDOSCOPIC ULTRASOUND GUIDANCE;  Surgeon: Khoi Gomes MD;  Location: [facility] OR    ESOPHAGOSCOPY, GASTROSCOPY, DUODENOSCOPY (EGD), COMBINED N/A 4/19/2023    Procedure: ESOPHAGOGASTRODUODENOSCOPY with stent removal;  Surgeon: Khoi Gomes MD;  Location: [facility] OR    EXTRACORPOREAL SHOCK WAVE LITHOTRIPSY (ESWL)      GASTRIC BYPASS  2016    ID CYSTO/URETERO W/LITHOTRIPSY &INDWELL STENT INSRT Left 5/11/2018    Procedure: CYSTOSCOPY, LEFT URETEROSCOPY LASER LITHOTRIPSY STENT INSERTION;  Surgeon: Skyler Delvalle MD;  Location: [facility];  Service: Urology    ID LAP GASTRIC BYPASS/DERICK-EN-Y N/A 6/8/2016    Procedure: GASTRIC BYPASS LAPAROSCOPIC DERICK-EN-Y;  Surgeon: Randy Sutherland MD;  Location: [facility] OR;  Service: General    TUBAL LIGATION      WISDOM TOOTH EXTRACTION Bilateral       SOCIAL HISTORY:   Social History     Tobacco Use    Smoking status: Former    Smokeless tobacco: Never    Tobacco comments:     vapes marijuana   Vaping Use    Vaping status: Never Used   Substance Use Topics    Alcohol use: Yes     Comment: occ.    Drug use: Not Currently     Types: Marijuana     FAMILY HISTORY:   Family History   Problem Relation Age of Onset    Depression Paternal Grandmother     Diabetes Mother     Diabetes Father     Hypertension Father     Chronic Obstructive Pulmonary Disease Father     Cancer Maternal Grandmother         gastric    Urolithiasis Maternal Grandmother     Heart Disease Maternal Grandfather     Cancer Maternal Grandfather         lung    Heart Disease Paternal  Grandfather     Breast Cancer No family hx of     Colon Cancer No family hx of     Prostate Cancer No family hx of     Cerebrovascular Disease No family hx of     Clotting Disorder No family hx of     Gout No family hx of      ALLERGIES:   Allergies   Allergen Reactions    Bee Venom Swelling    Ibuprofen Hives and Swelling     CURRENT MEDICATIONS:   Current Outpatient Medications   Medication Instructions    acetaminophen (TYLENOL) 650 mg, Oral, EVERY 4 HOURS PRN    buPROPion (WELLBUTRIN SR) 150 mg, Oral    calcitRIOL (ROCALTROL) 0.5 MCG capsule No dose, route, or frequency recorded.    calcium carbonate 500 mg (elemental) (OSCAL) 500 mg, Oral, 2 TIMES DAILY    clonazePAM (KLONOPIN) 0.5 mg, Oral, 2 TIMES DAILY PRN    diazepam (VALIUM) 5 mg, Oral, ONCE PRN    fludrocortisone (FLORINEF) 0.2 mg, Oral, DAILY    FLUoxetine (PROZAC) 60 mg, Oral, DAILY    gabapentin (NEURONTIN) 900 mg, Oral, 3 TIMES DAILY    hydrocortisone (CORTEF) 10 MG tablet Take 3 tablets (30 MG) every morning and take 2 tablets (20 MG) by mouth every day at 2 PM. Additional as directed.    levothyroxine (SYNTHROID/LEVOTHROID) 125 mcg, Oral, DAILY    ondansetron (ZOFRAN) 8 mg, Oral, EVERY 8 HOURS PRN    prochlorperazine (COMPAZINE) 5 mg, Oral, EVERY 8 HOURS PRN    temazepam (RESTORIL) 15 mg, Oral, AT BEDTIME PRN    UNABLE TO FIND Other, medical cannabis (Patient's own supply. Not a prescription)       PHYSICAL EXAMINATION:  Vital signs: /85 (BP Location: Right arm, Patient Position: Sitting, Cuff Size: Adult Regular)   Pulse 80   Temp 98.2  F (36.8  C) (Oral)   Resp 16   Wt 74.3 kg (163 lb 12.8 oz)   SpO2 98%   BMI 27.97 kg/m     Gen: NAD, on video no distress  No other exam  Recent Labs   Lab Test 07/01/25  0617 05/27/25  1517 03/25/25  1128 11/12/24  1533 06/25/24  1549 07/19/23  1047   NA  --  138 141 138 139 140   POTASSIUM  --  4.2 4.2 4.5 4.3 4.2   CHLORIDE  --  102 102 102 102 104   CO2  --  26 27 29 27 27   ANIONGAP  --  10 12 7 10  "9   BUN  --  17.4 11.6 10.5 8.4 9.6   CR  --  0.92 0.78 0.74 0.72 0.85   GLC 97 97 86 83 75 90   SHASHA  --  9.5 9.1 9.4 9.6 9.7     No results for input(s): \"MAG\", \"PHOS\" in the last 23267 hours.  Recent Labs   Lab Test 05/27/25  1517 03/25/25  1128 06/25/24  1549 07/19/23  1047 03/24/23  0747 11/21/22  1527 11/07/22  1326   WBC 8.8 4.9 5.4 4.4 7.2   < > 5.0   HGB 15.3 14.0 15.5 13.8 13.2   < > 12.4    200 214 159 197   < > 257   MCV 90 93 92 92 84   < > 85   NEUTROPHIL 64 64 63 60  --   --  60    < > = values in this interval not displayed.     Recent Labs   Lab Test 06/17/25  0823 05/27/25  1517 03/25/25  1128 11/12/24  1533 06/25/24  1549   BILITOTAL 0.4 0.3 0.3   < > 0.4   ALKPHOS 106 104 98   < > 100   ALT 21 26 23   < > 19   AST 21 26 26   < > 22   ALBUMIN 4.1 4.4 4.2   < > 4.6   LDH  --  179 155  --  166    < > = values in this interval not displayed.     TSH   Date Value Ref Range Status   05/27/2025 3.31 0.30 - 4.20 uIU/mL Final   03/25/2025 2.78 0.30 - 4.20 uIU/mL Final   11/12/2024 2.28 0.30 - 4.20 uIU/mL Final   05/12/2022 0.16 (L) 0.40 - 4.00 mU/L Final   03/24/2022 0.08 (L) 0.40 - 4.00 mU/L Final   02/08/2022 0.29 (L) 0.40 - 4.00 mU/L Final   07/05/2021 0.22 (L) 0.40 - 4.00 mU/L Final   06/04/2021 0.44 0.40 - 4.00 mU/L Final   05/10/2021 0.18 (L) 0.40 - 4.00 mU/L Final     No results for input(s): \"CEA\" in the last 06327 hours.  Results for orders placed or performed in visit on 05/20/25   CT Chest/Abdomen/Pelvis w Contrast    Narrative    EXAMINATION: CT CHEST/ABDOMEN/PELVIS W CONTRAST, 5/20/2025 6:09 PM    TECHNIQUE:  Helical CT images from the thoracic inlet through the  symphysis pubis were obtained  with contrast. Contrast dose: ISOVUE  370 85cc    COMPARISON: 6/25/2024    HISTORY: Right-sided functioning high-risk adrenocortical carcinoma  post surgery and adjuvant therapy followed for surveillance; Malignant  neoplasm of cortex of right adrenal gland (H); Elevated alkaline  phosphatase " level; Anxiety; SHREYA (obstructive sleep apnea)    FINDINGS:    Chest: Central tracheobronchial tree is patent. No pneumothorax. 5 mm  costal pleural based nodular appearing density in the left lower lobe  (4/120), new from prior. 2 mm nodular density along the inferior left  oblique fissure (4/176), new from prior. Similar small plaque-like  thickening along the superior left oblique fissure (4/123).  Curvilinear density in the right lower lobe, favor subsegmental  atelectasis.    Visualized thyroid gland shows similar subcentimeter nodules. No new  overt breast mass, mammogram is more sensitive for small breast  lesions. No central pulmonary thromboembolism on this nondedicated  study. No new bulky thoracic lymph nodes. No pericardial effusion. No  pleural effusion    Abdomen and pelvis: Similar hepatic observation in segment 5/6 with  peripheral nodular enhancement compatible with hemangioma. No new  focal hepatic observation. No biliary ductal dilatation. No calcified  gallstone. Symmetric enhancement of the kidneys. Postsurgical changes  of the right adrenalectomy. No new masslike renal lesion, similar  atrophic changes of right kidney.. No hydronephrosis. Extrarenal left  renal pelvis. No splenomegaly    Postsurgical changes of gastric bypass. Polypoid density in the  duodenal bulb, measuring about 1.5 cm, previously measured 1.5 cm and  measured similarly (6/32) . No high-grade bowel obstruction. No  evidence for appendicitis. Metallic intraluminal appearing density  along cecum (6/22). Moderate colonic stool burden.    Intrauterine contraceptive device. Left ovarian cyst measuring about  3.2 cm compared to 3.2 cm (3/518), when measured similarly. Partially  distended urinary bladder.    Patent major abdominal vasculature. No new bulky adenopathy. No  ascites or pneumoperitoneum. Similar soft tissue nodule with coarse  calcification in the pelvis adjacent to the rectosigmoid (3/509).  Prominent inguinal  nodes.    Bones and soft tissues: Small fat-containing ventral hernia. Stable 3  mm sclerotic focus in the proximal left femur possibly bone island.  Similar lucency in C7 vertebral body (8/60). No new aggressive or  acute osseous lesion.      Impression    IMPRESSION: Compared to prior CT from 6/25/2024, the current scan  shows;    1.Subcentimeter peripheral pleural-based nodular density in the left  lower lobe, indeterminate, differential includes possible atelectatic  density. 2 mm nodular density along the inferior left oblique fissure,  indeterminate, potential includes intrapulmonary node. Recommend  attention on short-term follow-up.    2. No new masslike lesion in the abdomen or pelvis.    3. Persistent left ovarian cyst measuring up to 3.2cm, may consider  ultrasound evaluation.    4. Metallic intraluminal appearing density along cecum possibly  representing post intervention or ingested, consider correlation    5. Additional incidental findings similar to prior, as detailed above.    ELSA ALLEN MD         SYSTEM ID:  [id number]     *Note: Due to a large number of results and/or encounters for the requested time period, some results have not been displayed. A complete set of results can be found in Results Review.            ASSESSMENT AND PLAN: A 39 year old  female with right-sided functioning high-risk adrenocortical carcinoma.     Adrenocortical carcinoma:  - Started on adjuvant mitotane in July 2018 based on her presentation and tumor characteristics including invasion of the adrenal capsule, high mitotic rate, possibly positive margins, and high Ki67, which could result in a rate of recurrence as high as 40%.    The mitotane was stopped by Dr. Liang and endocrinologic oncologist from [facility] in May 2021.        I have reviewed all of the labs done prior to this clinic visit.  Labs are all completely normal including electrolytes, renal function, hepatic panel, complete blood count  and differential. Her tumor markers are within normal limits. The scans do not show evidence of recurrent disease.     I joined late as she was booked outside of clinic timing. She had dropped off. I called her over 15 times over the next several hrs and could not reach her.     She follows at [facility]. She is over 6 yrs from her surgery and 3 yrs from discontinuing her mitotane.      Video-Visit Details    Type of service:  Video Visit  Originating Location (pt. Location): Home  Distant Location (provider location):  [facility] [place]   Platform used for Video Visit: [place]    No charge visit      Tru Hernadez    Hematologist and Medical Oncologist  [facility]

## (undated) DEVICE — ENDO DEVICE LOCKING AND BIOPSY CAP M00545261

## (undated) DEVICE — SU SILK 2-0 TIE 12X30" A305H

## (undated) DEVICE — SOL WATER IRRIG 1000ML BOTTLE 2F7114

## (undated) DEVICE — SU VICRYL 2-0 CT-1 27" UND J259H

## (undated) DEVICE — CLIP HORIZON LG ORANGE 004200

## (undated) DEVICE — PACK ENDOSCOPY GI CUSTOM UMMC

## (undated) DEVICE — ENDO CAP AND TUBING STERILE FOR ENDOGATOR  100130

## (undated) DEVICE — Device

## (undated) DEVICE — SU SILK 0 TIE 6X30" A306H

## (undated) DEVICE — VESSEL LOOPS YELLOW MAXI 31145694

## (undated) DEVICE — TAPE MICROPORE 2"X1.5YD 1530S-2

## (undated) DEVICE — PAD CHUX UNDERPAD 23X24" 7136

## (undated) DEVICE — VESSEL LOOPS BLUE SUPERMAXI 011022PBX

## (undated) DEVICE — SPONGE LAP 18X18" X8435

## (undated) DEVICE — PACK GOWN 3/PK DISP XL SBA32GPFCB

## (undated) DEVICE — DRAPE POUCH INSTRUMENT 1018

## (undated) DEVICE — SU ETHIBOND 0 CT-1 CR 8X18" CX21D

## (undated) DEVICE — SU PROLENE 4-0 RB-1DA 36" 8557H

## (undated) DEVICE — SUCTION MANIFOLD DORNOCH ULTRA CART UL-CL500

## (undated) DEVICE — ENDO TUBING CO2 SMARTCAP STERILE DISP 100145CO2EXT

## (undated) DEVICE — ENDO ADPT CATH ROTATABLE SIDE ARM G22677

## (undated) DEVICE — ENDO BITE BLOCK ADULT OMNI-BLOC

## (undated) DEVICE — ENDO FORCEP ENDOJAW BIOPSY 2.8MMX160CM FB-220K

## (undated) DEVICE — PREP CHLORAPREP 26ML TINTED ORANGE  260815

## (undated) DEVICE — DRSG PRIMAPORE 02X3" 7133

## (undated) DEVICE — LINEN TOWEL PACK X6 WHITE 5487

## (undated) DEVICE — STPL SKIN 35W 059037

## (undated) DEVICE — KIT ENDO FIRST STEP DISINFECTANT 200ML W/POUCH EP-4

## (undated) DEVICE — KIT CONNECTOR FOR OLYMPUS ENDOSCOPES DEFENDO 100310

## (undated) DEVICE — ESU GROUND PAD ADULT W/CORD E7507

## (undated) DEVICE — SUTURE BOOTS 051003PBX

## (undated) DEVICE — SYR 03ML LL W/O NDL 309657

## (undated) DEVICE — ENDO NDL ASPIRATION 19GA FLEX SLIMLINE EXPECT EUS M00555530

## (undated) DEVICE — CLIP HORIZON SM RED WIDE SLOT 001201

## (undated) DEVICE — SU PDS II 1 CTX 36" Z371T

## (undated) DEVICE — GLOVE PROTEXIS W/NEU-THERA 7.5  2D73TE75

## (undated) DEVICE — ENDO PROBE COVER ULTRASOUND BALLOON LATEX  MAJ-249

## (undated) DEVICE — BLADE KNIFE SURG 15 371115

## (undated) DEVICE — SUCTION MANIFOLD NEPTUNE 2 SYS 4 PORT 0702-020-000

## (undated) DEVICE — SU SILK 4-0 TIE 12X30" A303H

## (undated) DEVICE — SU PROLENE 3-0 SHDA 36" 8522H

## (undated) DEVICE — SU VICRYL 3-0 SH 27" J316H

## (undated) DEVICE — SOL NACL 0.9% IRRIG 1000ML BOTTLE 2F7124

## (undated) DEVICE — ESU LIGASURE IMPACT OPEN SEALER/DVDR CVD LG JAW LF4418

## (undated) DEVICE — SU SILK 3-0 TIE 12X30" A304H

## (undated) DEVICE — BNDG ABDOMINAL BINDER 9X62-84" 79-89210

## (undated) DEVICE — VESSEL LOOPS RED MINI 31145710

## (undated) DEVICE — SUCTION TIP YANKAUER W/O VENT K86

## (undated) DEVICE — WIPES FOLEY CARE SURESTEP PROVON DFC100

## (undated) DEVICE — LINEN TOWEL PACK X5 5464

## (undated) DEVICE — DRAIN JACKSON PRATT CHANNEL 19FR ROUND HUBLESS SIL JP-2230

## (undated) DEVICE — DRSG GAUZE 2X2" 8042

## (undated) DEVICE — PACK AB HYST II

## (undated) DEVICE — BALLOON ELATION 180CML 11-12-13.5-15-16MM 5.5CM EPX12

## (undated) DEVICE — BLADE CLIPPER SGL USE 9680

## (undated) DEVICE — SUCTION TIP YANKAUER STR K87

## (undated) DEVICE — SURGICEL FIBRILLAR HEMOSTAT 4"X4" 1963

## (undated) DEVICE — SPONGE SURGIFOAM 100 1974

## (undated) DEVICE — LINEN TOWEL PACK X30 5481

## (undated) DEVICE — INFLATION DEVICE BIG 60 ENDO-AN6012

## (undated) DEVICE — SU SILK 3-0 SH 30" K832H

## (undated) DEVICE — DRAIN JACKSON PRATT RESERVOIR 100ML SU130-1305

## (undated) DEVICE — WIRE GUIDE 0.025"X450CM STR VISIGLIDE G-240-2545S

## (undated) DEVICE — SPONGE KITTNER 30-101

## (undated) DEVICE — SPECIMEN CONTAINER 3OZ W/FORMALIN 59901

## (undated) DEVICE — CLIP HORIZON MED BLUE 002200

## (undated) RX ORDER — ONDANSETRON 2 MG/ML
INJECTION INTRAMUSCULAR; INTRAVENOUS
Status: DISPENSED
Start: 2024-03-11

## (undated) RX ORDER — SCOLOPAMINE TRANSDERMAL SYSTEM 1 MG/1
PATCH, EXTENDED RELEASE TRANSDERMAL
Status: DISPENSED
Start: 2023-04-19

## (undated) RX ORDER — HYDROMORPHONE HYDROCHLORIDE 1 MG/ML
INJECTION, SOLUTION INTRAMUSCULAR; INTRAVENOUS; SUBCUTANEOUS
Status: DISPENSED
Start: 2018-06-25

## (undated) RX ORDER — GLYCOPYRROLATE 0.2 MG/ML
INJECTION, SOLUTION INTRAMUSCULAR; INTRAVENOUS
Status: DISPENSED
Start: 2023-02-08

## (undated) RX ORDER — SODIUM CHLORIDE, SODIUM LACTATE, POTASSIUM CHLORIDE, CALCIUM CHLORIDE 600; 310; 30; 20 MG/100ML; MG/100ML; MG/100ML; MG/100ML
INJECTION, SOLUTION INTRAVENOUS
Status: DISPENSED
Start: 2018-06-25

## (undated) RX ORDER — CEFAZOLIN SODIUM 2 G/100ML
INJECTION, SOLUTION INTRAVENOUS
Status: DISPENSED
Start: 2018-06-25

## (undated) RX ORDER — PROPOFOL 10 MG/ML
INJECTION, EMULSION INTRAVENOUS
Status: DISPENSED
Start: 2023-01-18

## (undated) RX ORDER — DEXAMETHASONE SODIUM PHOSPHATE 4 MG/ML
INJECTION, SOLUTION INTRA-ARTICULAR; INTRALESIONAL; INTRAMUSCULAR; INTRAVENOUS; SOFT TISSUE
Status: DISPENSED
Start: 2023-02-08

## (undated) RX ORDER — DEXAMETHASONE SODIUM PHOSPHATE 4 MG/ML
INJECTION, SOLUTION INTRA-ARTICULAR; INTRALESIONAL; INTRAMUSCULAR; INTRAVENOUS; SOFT TISSUE
Status: DISPENSED
Start: 2022-12-13

## (undated) RX ORDER — INDOMETHACIN 50 MG/1
SUPPOSITORY RECTAL
Status: DISPENSED
Start: 2023-02-08

## (undated) RX ORDER — FENTANYL CITRATE 50 UG/ML
INJECTION, SOLUTION INTRAMUSCULAR; INTRAVENOUS
Status: DISPENSED
Start: 2023-02-08

## (undated) RX ORDER — FENTANYL CITRATE 50 UG/ML
INJECTION, SOLUTION INTRAMUSCULAR; INTRAVENOUS
Status: DISPENSED
Start: 2023-01-18

## (undated) RX ORDER — FENTANYL CITRATE 50 UG/ML
INJECTION, SOLUTION INTRAMUSCULAR; INTRAVENOUS
Status: DISPENSED
Start: 2018-06-25

## (undated) RX ORDER — ONDANSETRON 2 MG/ML
INJECTION INTRAMUSCULAR; INTRAVENOUS
Status: DISPENSED
Start: 2023-04-19

## (undated) RX ORDER — HYDROMORPHONE HCL/0.9% NACL/PF 0.2MG/0.2
SYRINGE (ML) INTRAVENOUS
Status: DISPENSED
Start: 2018-06-25

## (undated) RX ORDER — DROPERIDOL 2.5 MG/ML
INJECTION, SOLUTION INTRAMUSCULAR; INTRAVENOUS
Status: DISPENSED
Start: 2023-01-18

## (undated) RX ORDER — PROPOFOL 10 MG/ML
INJECTION, EMULSION INTRAVENOUS
Status: DISPENSED
Start: 2023-02-08

## (undated) RX ORDER — FENTANYL CITRATE 50 UG/ML
INJECTION, SOLUTION INTRAMUSCULAR; INTRAVENOUS
Status: DISPENSED
Start: 2022-12-13

## (undated) RX ORDER — LEVOFLOXACIN 5 MG/ML
INJECTION, SOLUTION INTRAVENOUS
Status: DISPENSED
Start: 2023-01-18

## (undated) RX ORDER — ACETAMINOPHEN 325 MG/1
TABLET ORAL
Status: DISPENSED
Start: 2018-06-25

## (undated) RX ORDER — IOPAMIDOL 510 MG/ML
INJECTION, SOLUTION INTRAVASCULAR
Status: DISPENSED
Start: 2023-02-08

## (undated) RX ORDER — PROPOFOL 10 MG/ML
INJECTION, EMULSION INTRAVENOUS
Status: DISPENSED
Start: 2018-06-25

## (undated) RX ORDER — ONDANSETRON 2 MG/ML
INJECTION INTRAMUSCULAR; INTRAVENOUS
Status: DISPENSED
Start: 2018-06-25

## (undated) RX ORDER — GABAPENTIN 300 MG/1
CAPSULE ORAL
Status: DISPENSED
Start: 2018-06-25

## (undated) RX ORDER — ONDANSETRON 2 MG/ML
INJECTION INTRAMUSCULAR; INTRAVENOUS
Status: DISPENSED
Start: 2023-02-08

## (undated) RX ORDER — ONDANSETRON 2 MG/ML
INJECTION INTRAMUSCULAR; INTRAVENOUS
Status: DISPENSED
Start: 2022-12-13

## (undated) RX ORDER — ONDANSETRON 2 MG/ML
INJECTION INTRAMUSCULAR; INTRAVENOUS
Status: DISPENSED
Start: 2023-01-18

## (undated) RX ORDER — PHENYLEPHRINE HCL IN 0.9% NACL 1 MG/10 ML
SYRINGE (ML) INTRAVENOUS
Status: DISPENSED
Start: 2018-06-25

## (undated) RX ORDER — SIMETHICONE 40MG/0.6ML
SUSPENSION, DROPS(FINAL DOSAGE FORM)(ML) ORAL
Status: DISPENSED
Start: 2023-02-08

## (undated) RX ORDER — FENTANYL CITRATE 50 UG/ML
INJECTION, SOLUTION INTRAMUSCULAR; INTRAVENOUS
Status: DISPENSED
Start: 2024-03-11

## (undated) RX ORDER — CALCIUM CHLORIDE 100 MG/ML
INJECTION INTRAVENOUS; INTRAVENTRICULAR
Status: DISPENSED
Start: 2018-06-25